# Patient Record
Sex: FEMALE | Race: WHITE | Employment: OTHER | URBAN - METROPOLITAN AREA
[De-identification: names, ages, dates, MRNs, and addresses within clinical notes are randomized per-mention and may not be internally consistent; named-entity substitution may affect disease eponyms.]

---

## 2019-04-26 ENCOUNTER — HOSPITAL ENCOUNTER (OUTPATIENT)
Dept: LAB | Age: 74
Discharge: HOME | End: 2019-04-26

## 2019-04-27 ENCOUNTER — HOSPITAL ENCOUNTER (INPATIENT)
Age: 74
LOS: 3 days | Discharge: HOME | DRG: 809 | End: 2019-04-30
Attending: EMERGENCY MEDICINE | Admitting: FAMILY MEDICINE
Payer: MEDICARE

## 2019-04-27 DIAGNOSIS — D61.818 PANCYTOPENIA (HCC): ICD-10-CM

## 2019-04-27 DIAGNOSIS — D69.6 THROMBOCYTOPENIA (HCC): ICD-10-CM

## 2019-04-27 DIAGNOSIS — D64.9 SYMPTOMATIC ANEMIA: ICD-10-CM

## 2019-04-27 DIAGNOSIS — C92.00 ACUTE MYELOID LEUKEMIA NOT HAVING ACHIEVED REMISSION (HCC): Primary | ICD-10-CM

## 2019-04-27 DIAGNOSIS — D84.9 IMMUNOCOMPROMISED (HCC): ICD-10-CM

## 2019-04-27 LAB
ALBUMIN SERPL-MCNC: 4 G/DL (ref 3.2–4.6)
ALBUMIN/GLOB SERPL: 1 {RATIO} (ref 1.2–3.5)
ALP SERPL-CCNC: 95 U/L (ref 50–136)
ALT SERPL-CCNC: 28 U/L (ref 12–65)
ANION GAP SERPL CALC-SCNC: 8 MMOL/L (ref 7–16)
AST SERPL-CCNC: 38 U/L (ref 15–37)
BACTERIA URNS QL MICRO: 0 /HPF
BASOPHILS # BLD: 0 K/UL (ref 0–0.2)
BASOPHILS NFR BLD: 0 % (ref 0–2)
BILIRUB SERPL-MCNC: 0.6 MG/DL (ref 0.2–1.1)
BUN SERPL-MCNC: 16 MG/DL (ref 8–23)
CALCIUM SERPL-MCNC: 8.6 MG/DL (ref 8.3–10.4)
CASTS URNS QL MICRO: NORMAL /LPF
CHLORIDE SERPL-SCNC: 108 MMOL/L (ref 98–107)
CO2 SERPL-SCNC: 25 MMOL/L (ref 21–32)
CREAT SERPL-MCNC: 0.82 MG/DL (ref 0.6–1)
DIFFERENTIAL METHOD BLD: ABNORMAL
EOSINOPHIL # BLD: 0 K/UL (ref 0–0.8)
EOSINOPHIL NFR BLD: 1 % (ref 0.5–7.8)
EPI CELLS #/AREA URNS HPF: NORMAL /HPF
ERYTHROCYTE [DISTWIDTH] IN BLOOD BY AUTOMATED COUNT: 15.7 % (ref 11.9–14.6)
GLOBULIN SER CALC-MCNC: 4.1 G/DL (ref 2.3–3.5)
GLUCOSE SERPL-MCNC: 105 MG/DL (ref 65–100)
HCT VFR BLD AUTO: 25.5 % (ref 35.8–46.3)
HGB BLD-MCNC: 8.8 G/DL (ref 11.7–15.4)
IMM GRANULOCYTES # BLD AUTO: 0.1 K/UL (ref 0–0.5)
IMM GRANULOCYTES NFR BLD AUTO: 4 % (ref 0–5)
LIPASE SERPL-CCNC: 109 U/L (ref 73–393)
LYMPHOCYTES # BLD: 1.2 K/UL (ref 0.5–4.6)
LYMPHOCYTES NFR BLD: 41 % (ref 13–44)
MCH RBC QN AUTO: 33.6 PG (ref 26.1–32.9)
MCHC RBC AUTO-ENTMCNC: 34.5 G/DL (ref 31.4–35)
MCV RBC AUTO: 97.3 FL (ref 79.6–97.8)
MONOCYTES # BLD: 1 K/UL (ref 0.1–1.3)
MONOCYTES NFR BLD: 37 % (ref 4–12)
NEUTS SEG # BLD: 0.5 K/UL (ref 1.7–8.2)
NEUTS SEG NFR BLD: 17 % (ref 43–78)
NRBC # BLD: 0.03 K/UL (ref 0–0.2)
PLATELET # BLD AUTO: 21 K/UL (ref 150–450)
PLATELET COMMENTS,PCOM: ABNORMAL
PMV BLD AUTO: 11.5 FL (ref 9.4–12.3)
POTASSIUM SERPL-SCNC: 4.3 MMOL/L (ref 3.5–5.1)
PROT SERPL-MCNC: 8.1 G/DL (ref 6.3–8.2)
RBC # BLD AUTO: 2.62 M/UL (ref 4.05–5.2)
RBC #/AREA URNS HPF: NORMAL /HPF
RBC MORPH BLD: ABNORMAL
SODIUM SERPL-SCNC: 141 MMOL/L (ref 136–145)
TROPONIN I SERPL-MCNC: <0.02 NG/ML (ref 0.02–0.05)
WBC # BLD AUTO: 2.8 K/UL (ref 4.3–11.1)
WBC MORPH BLD: ABNORMAL
WBC URNS QL MICRO: NORMAL /HPF

## 2019-04-27 PROCEDURE — 93005 ELECTROCARDIOGRAM TRACING: CPT | Performed by: EMERGENCY MEDICINE

## 2019-04-27 PROCEDURE — 86900 BLOOD TYPING SEROLOGIC ABO: CPT

## 2019-04-27 PROCEDURE — 86923 COMPATIBILITY TEST ELECTRIC: CPT

## 2019-04-27 PROCEDURE — 74011250637 HC RX REV CODE- 250/637: Performed by: FAMILY MEDICINE

## 2019-04-27 PROCEDURE — 81003 URINALYSIS AUTO W/O SCOPE: CPT | Performed by: EMERGENCY MEDICINE

## 2019-04-27 PROCEDURE — 80053 COMPREHEN METABOLIC PANEL: CPT

## 2019-04-27 PROCEDURE — 99284 EMERGENCY DEPT VISIT MOD MDM: CPT | Performed by: EMERGENCY MEDICINE

## 2019-04-27 PROCEDURE — 86920 COMPATIBILITY TEST SPIN: CPT

## 2019-04-27 PROCEDURE — 85025 COMPLETE CBC W/AUTO DIFF WBC: CPT

## 2019-04-27 PROCEDURE — 81015 MICROSCOPIC EXAM OF URINE: CPT

## 2019-04-27 PROCEDURE — 65660000000 HC RM CCU STEPDOWN

## 2019-04-27 PROCEDURE — 74011250636 HC RX REV CODE- 250/636: Performed by: FAMILY MEDICINE

## 2019-04-27 PROCEDURE — 77030039270 HC TU BLD FLTR CARD -A

## 2019-04-27 PROCEDURE — 83690 ASSAY OF LIPASE: CPT

## 2019-04-27 PROCEDURE — 84484 ASSAY OF TROPONIN QUANT: CPT

## 2019-04-27 PROCEDURE — 65270000015 HC RM PRIVATE ONCOLOGY

## 2019-04-27 RX ORDER — SODIUM CHLORIDE 9 MG/ML
250 INJECTION, SOLUTION INTRAVENOUS AS NEEDED
Status: DISCONTINUED | OUTPATIENT
Start: 2019-04-27 | End: 2019-04-30 | Stop reason: HOSPADM

## 2019-04-27 RX ORDER — ONDANSETRON 2 MG/ML
4 INJECTION INTRAMUSCULAR; INTRAVENOUS
Status: DISCONTINUED | OUTPATIENT
Start: 2019-04-27 | End: 2019-04-30 | Stop reason: HOSPADM

## 2019-04-27 RX ORDER — NALOXONE HYDROCHLORIDE 0.4 MG/ML
0.4 INJECTION, SOLUTION INTRAMUSCULAR; INTRAVENOUS; SUBCUTANEOUS AS NEEDED
Status: DISCONTINUED | OUTPATIENT
Start: 2019-04-27 | End: 2019-04-30 | Stop reason: HOSPADM

## 2019-04-27 RX ORDER — ACETAMINOPHEN 325 MG/1
650 TABLET ORAL
Status: DISCONTINUED | OUTPATIENT
Start: 2019-04-27 | End: 2019-04-28

## 2019-04-27 RX ORDER — HYDROCODONE BITARTRATE AND ACETAMINOPHEN 5; 325 MG/1; MG/1
1 TABLET ORAL
Status: DISCONTINUED | OUTPATIENT
Start: 2019-04-27 | End: 2019-04-28

## 2019-04-27 RX ADMIN — ONDANSETRON 4 MG: 2 INJECTION INTRAMUSCULAR; INTRAVENOUS at 22:29

## 2019-04-27 RX ADMIN — HYDROCODONE BITARTRATE AND ACETAMINOPHEN 1 TABLET: 5; 325 TABLET ORAL at 22:29

## 2019-04-28 PROBLEM — C92.00 AML (ACUTE MYELOBLASTIC LEUKEMIA) (HCC): Status: ACTIVE | Noted: 2019-04-28

## 2019-04-28 PROBLEM — R53.1 WEAKNESS GENERALIZED: Status: ACTIVE | Noted: 2019-04-28

## 2019-04-28 PROBLEM — D61.818 PANCYTOPENIA (HCC): Status: ACTIVE | Noted: 2019-04-28

## 2019-04-28 LAB
ATRIAL RATE: 68 BPM
CALCULATED P AXIS, ECG09: 71 DEGREES
CALCULATED R AXIS, ECG10: -26 DEGREES
CALCULATED T AXIS, ECG11: 63 DEGREES
DIAGNOSIS, 93000: NORMAL
DIFFERENTIAL METHOD BLD: ABNORMAL
EOSINOPHIL # BLD: 0.1 K/UL (ref 0–0.8)
EOSINOPHIL NFR BLD MANUAL: 3 % (ref 1–8)
ERYTHROCYTE [DISTWIDTH] IN BLOOD BY AUTOMATED COUNT: 16.2 % (ref 11.9–14.6)
HCT VFR BLD AUTO: 24.8 % (ref 35.8–46.3)
HGB BLD-MCNC: 8.6 G/DL (ref 11.7–15.4)
LDH SERPL L TO P-CCNC: 352 U/L (ref 110–210)
LYMPHOCYTES # BLD: 1.7 K/UL (ref 0.5–4.6)
LYMPHOCYTES NFR BLD MANUAL: 60 % (ref 16–44)
MCH RBC QN AUTO: 33.9 PG (ref 26.1–32.9)
MCHC RBC AUTO-ENTMCNC: 34.7 G/DL (ref 31.4–35)
MCV RBC AUTO: 97.6 FL (ref 79.6–97.8)
MONOCYTES # BLD: 0.6 K/UL (ref 0.1–1.3)
MONOCYTES NFR BLD MANUAL: 22 % (ref 3–9)
NEUTS BAND NFR BLD MANUAL: 2 % (ref 0–10)
NEUTS SEG # BLD: 0.4 K/UL (ref 1.7–8.2)
NEUTS SEG NFR BLD MANUAL: 13 % (ref 47–75)
NRBC # BLD: 0.03 K/UL (ref 0–0.2)
NRBC BLD-RTO: 4 PER 100 WBC
P-R INTERVAL, ECG05: 194 MS
PLATELET # BLD AUTO: 46 K/UL (ref 150–450)
PLATELET COMMENTS,PCOM: ABNORMAL
PMV BLD AUTO: 10.2 FL (ref 9.4–12.3)
Q-T INTERVAL, ECG07: 412 MS
QRS DURATION, ECG06: 108 MS
QTC CALCULATION (BEZET), ECG08: 438 MS
RBC # BLD AUTO: 2.54 M/UL (ref 4.05–5.2)
RBC MORPH BLD: ABNORMAL
URATE SERPL-MCNC: 5.1 MG/DL (ref 2.6–6)
VENTRICULAR RATE, ECG03: 68 BPM
VIT B12 SERPL-MCNC: 998 PG/ML (ref 193–986)
WBC # BLD AUTO: 2.8 K/UL (ref 4.3–11.1)
WBC MORPH BLD: ABNORMAL

## 2019-04-28 PROCEDURE — 76937 US GUIDE VASCULAR ACCESS: CPT

## 2019-04-28 PROCEDURE — 84550 ASSAY OF BLOOD/URIC ACID: CPT

## 2019-04-28 PROCEDURE — P9035 PLATELET PHERES LEUKOREDUCED: HCPCS

## 2019-04-28 PROCEDURE — 82306 VITAMIN D 25 HYDROXY: CPT

## 2019-04-28 PROCEDURE — 99223 1ST HOSP IP/OBS HIGH 75: CPT | Performed by: INTERNAL MEDICINE

## 2019-04-28 PROCEDURE — 74011250637 HC RX REV CODE- 250/637: Performed by: FAMILY MEDICINE

## 2019-04-28 PROCEDURE — 74011250637 HC RX REV CODE- 250/637: Performed by: NURSE PRACTITIONER

## 2019-04-28 PROCEDURE — P9016 RBC LEUKOCYTES REDUCED: HCPCS

## 2019-04-28 PROCEDURE — 65270000015 HC RM PRIVATE ONCOLOGY

## 2019-04-28 PROCEDURE — 65660000000 HC RM CCU STEPDOWN

## 2019-04-28 PROCEDURE — 80074 ACUTE HEPATITIS PANEL: CPT

## 2019-04-28 PROCEDURE — 74011250637 HC RX REV CODE- 250/637: Performed by: INTERNAL MEDICINE

## 2019-04-28 PROCEDURE — 36569 INSJ PICC 5 YR+ W/O IMAGING: CPT | Performed by: NURSE PRACTITIONER

## 2019-04-28 PROCEDURE — 30233N1 TRANSFUSION OF NONAUTOLOGOUS RED BLOOD CELLS INTO PERIPHERAL VEIN, PERCUTANEOUS APPROACH: ICD-10-PCS | Performed by: EMERGENCY MEDICINE

## 2019-04-28 PROCEDURE — 36415 COLL VENOUS BLD VENIPUNCTURE: CPT

## 2019-04-28 PROCEDURE — 74011250636 HC RX REV CODE- 250/636: Performed by: FAMILY MEDICINE

## 2019-04-28 PROCEDURE — 82607 VITAMIN B-12: CPT

## 2019-04-28 PROCEDURE — 87389 HIV-1 AG W/HIV-1&-2 AB AG IA: CPT

## 2019-04-28 PROCEDURE — 36430 TRANSFUSION BLD/BLD COMPNT: CPT

## 2019-04-28 PROCEDURE — 85025 COMPLETE CBC W/AUTO DIFF WBC: CPT

## 2019-04-28 PROCEDURE — 83615 LACTATE (LD) (LDH) ENZYME: CPT

## 2019-04-28 PROCEDURE — C1751 CATH, INF, PER/CENT/MIDLINE: HCPCS

## 2019-04-28 PROCEDURE — 30233R1 TRANSFUSION OF NONAUTOLOGOUS PLATELETS INTO PERIPHERAL VEIN, PERCUTANEOUS APPROACH: ICD-10-PCS | Performed by: EMERGENCY MEDICINE

## 2019-04-28 RX ORDER — FLUCONAZOLE 100 MG/1
200 TABLET ORAL DAILY
Status: DISCONTINUED | OUTPATIENT
Start: 2019-04-28 | End: 2019-04-30 | Stop reason: HOSPADM

## 2019-04-28 RX ORDER — HYDROCODONE BITARTRATE AND ACETAMINOPHEN 5; 325 MG/1; MG/1
1 TABLET ORAL
Status: DISCONTINUED | OUTPATIENT
Start: 2019-04-28 | End: 2019-04-30 | Stop reason: HOSPADM

## 2019-04-28 RX ORDER — DIPHENHYDRAMINE HCL 25 MG
CAPSULE ORAL
Status: ACTIVE
Start: 2019-04-28 | End: 2019-04-28

## 2019-04-28 RX ORDER — ACYCLOVIR 800 MG/1
400 TABLET ORAL 2 TIMES DAILY
Status: DISCONTINUED | OUTPATIENT
Start: 2019-04-28 | End: 2019-04-30 | Stop reason: HOSPADM

## 2019-04-28 RX ORDER — LORAZEPAM 1 MG/1
1 TABLET ORAL
Status: DISCONTINUED | OUTPATIENT
Start: 2019-04-28 | End: 2019-04-30 | Stop reason: HOSPADM

## 2019-04-28 RX ORDER — ALLOPURINOL 300 MG/1
300 TABLET ORAL DAILY
Status: DISCONTINUED | OUTPATIENT
Start: 2019-04-28 | End: 2019-04-30 | Stop reason: HOSPADM

## 2019-04-28 RX ORDER — MORPHINE SULFATE 2 MG/ML
2 INJECTION, SOLUTION INTRAMUSCULAR; INTRAVENOUS
Status: DISCONTINUED | OUTPATIENT
Start: 2019-04-28 | End: 2019-04-30 | Stop reason: HOSPADM

## 2019-04-28 RX ORDER — ESCITALOPRAM OXALATE 10 MG/1
10 TABLET ORAL DAILY
COMMUNITY
End: 2019-07-10

## 2019-04-28 RX ORDER — PRAVASTATIN SODIUM 10 MG/1
20 TABLET ORAL
COMMUNITY

## 2019-04-28 RX ORDER — CELECOXIB 200 MG/1
200 CAPSULE ORAL DAILY
COMMUNITY
End: 2019-04-30

## 2019-04-28 RX ORDER — PRAVASTATIN SODIUM 20 MG/1
10 TABLET ORAL
Status: DISCONTINUED | OUTPATIENT
Start: 2019-04-28 | End: 2019-04-30 | Stop reason: HOSPADM

## 2019-04-28 RX ORDER — ESCITALOPRAM OXALATE 10 MG/1
10 TABLET ORAL DAILY
Status: DISCONTINUED | OUTPATIENT
Start: 2019-04-28 | End: 2019-04-30 | Stop reason: HOSPADM

## 2019-04-28 RX ORDER — DIPHENHYDRAMINE HCL 25 MG
25 CAPSULE ORAL
Status: DISCONTINUED | OUTPATIENT
Start: 2019-04-28 | End: 2019-04-30 | Stop reason: HOSPADM

## 2019-04-28 RX ORDER — LORAZEPAM 1 MG/1
TABLET ORAL
COMMUNITY
End: 2020-01-01

## 2019-04-28 RX ORDER — PROCHLORPERAZINE EDISYLATE 5 MG/ML
5-10 INJECTION INTRAMUSCULAR; INTRAVENOUS
Status: DISCONTINUED | OUTPATIENT
Start: 2019-04-28 | End: 2019-04-30 | Stop reason: HOSPADM

## 2019-04-28 RX ORDER — DIPHENHYDRAMINE HCL 25 MG
50 CAPSULE ORAL ONCE
Status: COMPLETED | OUTPATIENT
Start: 2019-04-28 | End: 2019-04-28

## 2019-04-28 RX ORDER — LORAZEPAM 1 MG/1
TABLET ORAL
Status: ACTIVE
Start: 2019-04-28 | End: 2019-04-28

## 2019-04-28 RX ORDER — POLYETHYLENE GLYCOL 3350 17 G/17G
17 POWDER, FOR SOLUTION ORAL
Status: DISCONTINUED | OUTPATIENT
Start: 2019-04-28 | End: 2019-04-30 | Stop reason: HOSPADM

## 2019-04-28 RX ADMIN — LORAZEPAM 1 MG: 1 TABLET ORAL at 01:31

## 2019-04-28 RX ADMIN — FLUCONAZOLE 200 MG: 100 TABLET ORAL at 09:09

## 2019-04-28 RX ADMIN — ACYCLOVIR 400 MG: 800 TABLET ORAL at 17:26

## 2019-04-28 RX ADMIN — ONDANSETRON 4 MG: 2 INJECTION INTRAMUSCULAR; INTRAVENOUS at 09:09

## 2019-04-28 RX ADMIN — ESCITALOPRAM OXALATE 10 MG: 10 TABLET ORAL at 09:09

## 2019-04-28 RX ADMIN — PRAVASTATIN SODIUM 10 MG: 20 TABLET ORAL at 22:37

## 2019-04-28 RX ADMIN — LORAZEPAM 1 MG: 1 TABLET ORAL at 22:36

## 2019-04-28 RX ADMIN — DIPHENHYDRAMINE HYDROCHLORIDE 25 MG: 25 CAPSULE ORAL at 01:31

## 2019-04-28 RX ADMIN — DIPHENHYDRAMINE HYDROCHLORIDE 50 MG: 25 CAPSULE ORAL at 22:38

## 2019-04-28 RX ADMIN — ACYCLOVIR 400 MG: 800 TABLET ORAL at 09:09

## 2019-04-28 RX ADMIN — ONDANSETRON 4 MG: 2 INJECTION INTRAMUSCULAR; INTRAVENOUS at 17:30

## 2019-04-28 RX ADMIN — ALLOPURINOL 300 MG: 300 TABLET ORAL at 09:09

## 2019-04-28 NOTE — PROGRESS NOTES
Problem: Falls - Risk of 
Goal: *Absence of Falls Description Document Etienne Dasilva Fall Risk and appropriate interventions in the flowsheet.  
Outcome: Progressing Towards Goal

## 2019-04-28 NOTE — PROGRESS NOTES
EOS: VSS.  to bring CPAP from home. Tolerated blood transfusions without reaction. Discussed diagnosis at length with pt and . Pt had appointment scheduled with Dr. Marianela Rodriguez on Monday. Oncology will see her today and discuss prognosis and treatment options. Continue with POC. Report to oncoming RN.

## 2019-04-28 NOTE — H&P
Hospitalist Admission History and Physical  
 
NAME:  Mendez Bautista Age:  68 y.o. 
:   1945 MRN:   [id number] PCP: None Consulting MD: Treatment Team: Attending Provider: Cassandra Giang DO Chief Complaint Patient presents with  Fatigue  Cancer HPI:  
Patient is a 68 y.o. female who presented to the ED for cc nausea, poor PO intake, ecchymosis, and bleeding. Patient recently had a bone marrow biopsy on  that revealed AML. Last CBC showed Hg 9.2 and platelet level of 24 on 19. Since today, patient noted bleeding from her left buttock cheek and had increased shortness of breathe. Vitals stable Labs - WBC 2.8, Hg 8.8, Platelets 21, absolute neutrophil count 0.5, Past Medical History:  
Diagnosis Date  Cancer ([facility] [address] AML No past surgical history on file. No family history on file. Social History Social History Narrative  Not on file Social History Tobacco Use  Smoking status: Never Smoker  Smokeless tobacco: Never Used Substance Use Topics  Alcohol use: Never Frequency: Never Social History Substance and Sexual Activity Drug Use Never No Known Allergies Prior to Admission medications Medication Sig Start Date End Date Taking? Authorizing Provider  
celecoxib (CELEBREX) 200 mg capsule Take 200 mg by mouth daily. Yes Provider, Historical  
vit C/E/zinc/lutein/zeaxanthin ([address] PO) Take 1 Tab by mouth daily. Yes Provider, Historical  
escitalopram oxalate (LEXAPRO) 10 mg tablet Take 10 mg by mouth daily. Yes Provider, Historical  
LORazepam (ATIVAN) 1 mg tablet Take  by mouth nightly. Yes Provider, Historical  
acetaminophen 500 mg tab 500 mg, diphenhydrAMINE 25 mg cap 25 mg Take  by mouth nightly. Yes Provider, Historical  
pravastatin (PRAVACHOL) 10 mg tablet Take  by mouth nightly. Yes Provider, Historical  
 
 
 
 
Review of Systems Constitutional:NAD 
 Eyes:  no change in visual acuity, no photophobia Ears, nose, mouth, throat, and face: no  Odynphagia, dysphagia, no thrush or exudate, negative for chronic sinus congestion, recurrent headaches Respiratory: negative for SOB, hemoptysis or cough Cardiovascular: negative for CP, palpitations, or PND Gastrointestinal: negative for abdominal pain, no hematemesis, hematochezia or BRBPR Genitourinary: no urgency, frequency, or dysuria, no nocturia Integument/breast: negative for skin rash or skin lesions Hematologic/lymphatic: bleeding from left buttock that has stopped Musculoskeletal:generalized weakness Neurological: negative for lightheadedness, syncope or presyncopal events, no seizure or CVA history Behavioral/Psych: negative for depression or chronic anxiety, Endocrine: negative for polydyspia, polyuria or intolerance to heat or cold Allergic/Immunologic: negative for chronic allergic rhinitis, or known connective tissue disorder Objective:  
 
Visit Vitals /66 (BP 1 Location: Right arm, BP Patient Position: At rest) Pulse 74 Temp 98.3 °F (36.8 °C) Resp 18 Ht 5' 6\" (1.676 m) Wt 86.3 kg (190 lb 4.8 oz) SpO2 93% Breastfeeding? No  
BMI 30.72 kg/m² No intake/output data recorded. No intake/output data recorded. Data Review:  
Recent Results (from the past 24 hour(s)) CBC WITH AUTOMATED DIFF Collection Time: 04/27/19  8:10 PM  
Result Value Ref Range WBC 2.8 (L) 4.3 - 11.1 K/uL  
 RBC 2.62 (L) 4.05 - 5.2 M/uL HGB 8.8 (L) 11.7 - 15.4 g/dL HCT 25.5 (L) 35.8 - 46.3 % MCV 97.3 79.6 - 97.8 FL  
 MCH 33.6 (H) 26.1 - 32.9 PG  
 MCHC 34.5 31.4 - 35.0 g/dL  
 RDW 15.7 (H) 11.9 - 14.6 % PLATELET 21 (LL) 994 - 450 K/uL MPV 11.5 9.4 - 12.3 FL ABSOLUTE NRBC 0.03 0.0 - 0.2 K/uL NEUTROPHILS 17 (L) 43 - 78 % LYMPHOCYTES 41 13 - 44 % MONOCYTES 37 (H) 4.0 - 12.0 % EOSINOPHILS 1 0.5 - 7.8 %  BASOPHILS 0 0.0 - 2.0 %  
 IMMATURE GRANULOCYTES 4 0.0 - 5.0 %  
 ABS. NEUTROPHILS 0.5 (L) 1.7 - 8.2 K/UL  
 ABS. LYMPHOCYTES 1.2 0.5 - 4.6 K/UL  
 ABS. MONOCYTES 1.0 0.1 - 1.3 K/UL  
 ABS. EOSINOPHILS 0.0 0.0 - 0.8 K/UL  
 ABS. BASOPHILS 0.0 0.0 - 0.2 K/UL  
 ABS. IMM. GRANS. 0.1 0.0 - 0.5 K/UL  
 RBC COMMENTS NORMOCYTIC/NORMOCHROMIC    
 WBC COMMENTS Result Confirmed By Smear PLATELET COMMENTS MARKED    
 DF AUTOMATED METABOLIC PANEL, COMPREHENSIVE Collection Time: 04/27/19  8:10 PM  
Result Value Ref Range Sodium 141 136 - 145 mmol/L Potassium 4.3 3.5 - 5.1 mmol/L Chloride 108 (H) 98 - 107 mmol/L  
 CO2 25 21 - 32 mmol/L Anion gap 8 7 - 16 mmol/L Glucose 105 (H) 65 - 100 mg/dL BUN 16 8 - 23 MG/DL Creatinine 0.82 0.6 - 1.0 MG/DL  
 GFR est AA >60 >60 ml/min/1.73m2 GFR est non-AA >60 >60 ml/min/1.73m2 Calcium 8.6 8.3 - 10.4 MG/DL Bilirubin, total 0.6 0.2 - 1.1 MG/DL  
 ALT (SGPT) 28 12 - 65 U/L  
 AST (SGOT) 38 (H) 15 - 37 U/L Alk. phosphatase 95 50 - 136 U/L Protein, total 8.1 6.3 - 8.2 g/dL Albumin 4.0 3.2 - 4.6 g/dL Globulin 4.1 (H) 2.3 - 3.5 g/dL A-G Ratio 1.0 (L) 1.2 - 3.5 LIPASE Collection Time: 04/27/19  8:10 PM  
Result Value Ref Range Lipase 109 73 - 393 U/L  
TROPONIN I Collection Time: 04/27/19  8:10 PM  
Result Value Ref Range Troponin-I, Qt. <0.02 (L) 0.02 - 0.05 NG/ML  
EKG, 12 LEAD, INITIAL Collection Time: 04/27/19  8:13 PM  
Result Value Ref Range Ventricular Rate 68 BPM  
 Atrial Rate 68 BPM  
 P-R Interval 194 ms QRS Duration 108 ms Q-T Interval 412 ms QTC Calculation (Bezet) 438 ms Calculated P Axis 71 degrees Calculated R Axis -26 degrees Calculated T Axis 63 degrees Diagnosis Normal sinus rhythm Minimal voltage criteria for LVH, may be normal variant Borderline ECG No previous ECGs available PLATELETS, ALLOCATE Collection Time: 04/27/19  8:45 PM  
Result Value Ref Range Unit number [id number] Blood component type PLTPH LR 3 Unit division 00 Status of unit ISSUED   
TYPE + CROSSMATCH Collection Time: 04/27/19  8:47 PM  
Result Value Ref Range Crossmatch Expiration 04/30/2019 ABO/Rh(D) AB POSITIVE Antibody screen NEG Unit number [id number] Blood component type RC LR Unit division 00 Status of unit ALLOCATED Crossmatch result Compatible URINE MICROSCOPIC Collection Time: 04/27/19  9:24 PM  
Result Value Ref Range WBC 0-3 0 /hpf  
 RBC  0 /hpf Epithelial cells 0-3 0 /hpf Bacteria 0 0 /hpf Casts 0-3 0 /lpf Physical Exam:  
 
General:  Alert, cooperative, no distress, appears stated age. Pale. Eyes:  Conjunctivae/corneas clear. Ears:  Normal TMs and external ear canals both ears. Nose: Nares normal. Septum midline. Mouth/Throat: Lips, mucosa, and tongue normal.  
Neck:  no JVD. Back:   Symmetric, no curvature. ROM normal. No CVA tenderness. Lungs:   Clear to auscultation bilaterally. Heart:  Regular rate and rhythm, S1, S2 normal, no murmur, click, rub or gallop. Abdomen:   Soft, non-tender. Bowel sounds normal. No masses,  No organomegaly. Extremities: Extremities normal, atraumatic, no cyanosis or edema. Pulses: 2+ and symmetric all extremities. Skin: Ecchymosis from recent bone marrow biopsy. No active bleeding noted. Lymph nodes: Cervical, supraclavicular, and axillary nodes normal.  
Neurologic: CNII-XII intact. Assessment and Plan Active Problems: Thrombocytopenia ([address] (4/27/2019) AML (acute myeloblastic leukemia) ([address] (4/28/2019) Weakness generalized (4/28/2019) Pancytopenia ([address] (4/28/2019) Pancytopenia - To get 1 unit of platelets and 1 unit PRBC. Check later this AM. No obvious signs of bleeding now. Trend H&H. AML - Consult oncology to see later this AM. Assume care. Poor PO intake - PRN Zofran. Encourage good eating and drinking. Patient has noted neutropenia. No fever. Possible candidate for Neupogen if heme/onc agrees. DVT prophylaxis - SCDs Signed By: Arely Sotomayor DO April 28, 2019

## 2019-04-28 NOTE — ED NOTES
Patient to ED via POV. Patient with recent CA (AML) diagnosis, scheduled to follow with Eri Howe on Monday. Patient reports increasing lethargy with nausea w/o vomiting. Patient reports poor intake. Patient reports significant bruising to groin/genitals without trauma. Patient reports an area of irritate to the buttock that was bleeding heavily, unknown source. Patient denies the blood as from the rectum. Patient had a transfusion on Tuesday of prbc and platelets. Patient with stable VS at time of triage.

## 2019-04-28 NOTE — ROUTINE PROCESS
TRANSFER - OUT REPORT: 
 
Verbal report given to Wendie(name) on Mila Speed  being transferred to [facility](unit) for routine progression of care Report consisted of patients Situation, Background, Assessment and  
Recommendations(SBAR). Information from the following report(s) SBAR was reviewed with the receiving nurse. Lines:  
Peripheral IV 04/27/19 Left Antecubital (Active) Site Assessment Clean, dry, & intact 4/27/2019 10:12 PM  
Phlebitis Assessment 0 4/27/2019 10:12 PM  
Infiltration Assessment 0 4/27/2019 10:12 PM  
Dressing Status Clean, dry, & intact 4/27/2019 10:12 PM  
Dressing Type Transparent 4/27/2019 10:12 PM  
  
 
Opportunity for questions and clarification was provided. Patient transported with: 
 [employer]

## 2019-04-28 NOTE — CONSULTS
[employer] [facility] Inpatient Hematology / Oncology Consult NoteReason for Consult: Thrombocytopenia ([facility] [D69.6] Referring Physician:  Jo-Ann Silva DO History of Present Illness: Ms. Allyssa Howe is a 68 y.o. female admitted on 4/27/2019. She presented to ED with c/o nausea, poor oral intake, ecchymosis, and bleedinf from her L buttock cheek. She also c/o malaise and nausea. She had a recent BMbx at [facility] on 4/24 which reveal AML. She was scheduled to f/u with Dr. Mariann Tierney tomorrow as a new patient. On admission, WBC 2.8/ANC 0.5, Hgb 8.8, Plt 21k. She was given 1 unit PRBCs and 1 unit plt. Hgb 8.6 and Plt 46k today. No further bleeding noted. We were consulted to assume care. Review of Systems: 
Constitutional +fatigue +appetite changes. Denies fever, chills, weight loss, night sweats. HEENT Denies trauma, blurry vision, hearing loss, ear pain, nosebleeds, sore throat, neck pain and ear discharge. Skin Denies lesions or rashes. Lungs Denies dyspnea, cough, sputum production or hemoptysis. Cardiovascular Denies chest pain, palpitations, or lower extremity edema. Gastrointestinal +nausea. Denies vomiting, changes in bowel habits, bloody or black stools, abdominal pain.  Denies dysuria, frequency or hesitancy of urination. Neuro Denies headaches, visual changes or ataxia. Denies dizziness, tingling, tremors, sensory change, speech change, focal weakness or headaches. Hematology +easy bruising. Denies gingival bleeding or epistaxis. Endo Denies heat/cold intolerance, denies diabetes or thyroid abnormalities. MSK Denies back pain, arthralgias, myalgias or frequent falls. Psychiatric/Behavioral +anxious. Denies depression and substance abuse. No Known Allergies Past Medical History:  
Diagnosis Date  Cancer ([facility] [address] AML No past surgical history on file. No family history on file. Social History Socioeconomic History  Marital status:  Spouse name: Not on file  Number of children: Not on file  Years of education: Not on file  Highest education level: Not on file Occupational History  Not on file Social Needs  Financial resource strain: Not on file  Food insecurity:  
  Worry: Not on file Inability: Not on file  Transportation needs:  
  Medical: Not on file Non-medical: Not on file Tobacco Use  Smoking status: Never Smoker  Smokeless tobacco: Never Used Substance and Sexual Activity  Alcohol use: Never Frequency: Never  Drug use: Never  Sexual activity: Not on file Lifestyle  Physical activity:  
  Days per week: Not on file Minutes per session: Not on file  Stress: Not on file Relationships  Social connections:  
  Talks on phone: Not on file Gets together: Not on file Attends [employer] witness service: Not on file Active member of club or organization: Not on file Attends meetings of clubs or organizations: Not on file Relationship status: Not on file  Intimate partner violence:  
  Fear of current or ex partner: Not on file Emotionally abused: Not on file Physically abused: Not on file Forced sexual activity: Not on file Other Topics Concern  Not on file Social History Narrative  Not on file Current Facility-Administered Medications Medication Dose Route Frequency Provider Last Rate Last Dose  escitalopram oxalate (LEXAPRO) tablet 10 mg  10 mg Oral DAILY Dylan Sosa, DO   10 mg at 04/28/19 5114  LORazepam (ATIVAN) tablet 1 mg  1 mg Oral QHS Dylan Sosa, DO   1 mg at 04/28/19 0131  
 pravastatin (PRAVACHOL) tablet 10 mg  10 mg Oral QHS Dylan Sosa, DO      
 diphenhydrAMINE (BENADRYL) capsule 25 mg  25 mg Oral Q4H PRN Celena Melgar MD   25 mg at 04/28/19 0131  LORazepam (ATIVAN) 1 mg tablet  diphenhydrAMINE (BENADRYL) 25 mg capsule  acyclovir (ZOVIRAX) tablet 400 mg  400 mg Oral BID Roman Leon NP   400 mg at 19 0279  fluconazole (DIFLUCAN) tablet 200 mg  200 mg Oral DAILY Roman Leon NP   200 mg at 19 3802  allopurinol (ZYLOPRIM) tablet 300 mg  300 mg Oral DAILY Roman Leon NP   300 mg at 19 2527  LORazepam (ATIVAN) tablet 1 mg  1 mg Oral Q6H PRN Roman Leon NP      
 prochlorperazine (COMPAZINE) injection 5-10 mg  5-10 mg IntraVENous Q6H PRN Roman Leon NP      
 HYDROcodone-acetaminophen (NORCO) 5-325 mg per tablet 1 Tab  1 Tab Oral Q6H PRN Roman Leon NP      
 morphine injection 2 mg  2 mg IntraVENous Q4H PRN Roman Leon NP      
 0.9% sodium chloride infusion 250 mL  250 mL IntraVENous PRN Robel Bergman MD      
 [facility]) injection 0.4 mg  0.4 mg IntraVENous PRN Cleola Casino, DO      
 ondansetron [facility]) injection 4 mg  4 mg IntraVENous Q4H PRN Cleola Casino, DO   4 mg at 19 9744 OBJECTIVE: 
Patient Vitals for the past 8 hrs: 
 BP Temp Pulse Resp SpO2  
19 1035 146/62 97.5 °F (36.4 °C) 67 18 95 % 19 0740 130/61 98.5 °F (36.9 °C) 73 18 95 % Temp (24hrs), Av.3 °F (36.8 °C), Min:97.5 °F (36.4 °C), Max:98.5 °F (36.9 °C) 
 
 07 -  1900 In: -  
Out: 850 [Urine:850] Physical Exam: 
Constitutional: Well developed, well nourished female in no acute distress, sitting comfortably on the bedside couch.  at bedside. HEENT: Normocephalic and atraumatic. Oropharynx is clear, mucous membranes are moist. Extraocular muscles are intact. Sclerae anicteric. Neck supple without JVD. No thyromegaly present. Skin Warm and dry. Bruising noted. No erythema. No pallor. Respiratory Lungs are clear to auscultation bilaterally without wheezes, rales or rhonchi, normal air exchange without accessory muscle use. CVS Normal rate, regular rhythm and normal S1 and S2. No murmurs, gallops, or rubs. Abdomen Soft, nontender and nondistended, normoactive bowel sounds. No palpable mass. No hepatosplenomegaly. Neuro Grossly nonfocal with no obvious sensory or motor deficits. MSK Normal range of motion in general.  No edema and no tenderness. Psych Anxious. Labs:   
Recent Results (from the past 24 hour(s)) CBC WITH AUTOMATED DIFF Collection Time: 04/27/19  8:10 PM  
Result Value Ref Range WBC 2.8 (L) 4.3 - 11.1 K/uL  
 RBC 2.62 (L) 4.05 - 5.2 M/uL HGB 8.8 (L) 11.7 - 15.4 g/dL HCT 25.5 (L) 35.8 - 46.3 % MCV 97.3 79.6 - 97.8 FL  
 MCH 33.6 (H) 26.1 - 32.9 PG  
 MCHC 34.5 31.4 - 35.0 g/dL  
 RDW 15.7 (H) 11.9 - 14.6 % PLATELET 21 (LL) 567 - 450 K/uL MPV 11.5 9.4 - 12.3 FL ABSOLUTE NRBC 0.03 0.0 - 0.2 K/uL NEUTROPHILS 17 (L) 43 - 78 % LYMPHOCYTES 41 13 - 44 % MONOCYTES 37 (H) 4.0 - 12.0 % EOSINOPHILS 1 0.5 - 7.8 % BASOPHILS 0 0.0 - 2.0 % IMMATURE GRANULOCYTES 4 0.0 - 5.0 %  
 ABS. NEUTROPHILS 0.5 (L) 1.7 - 8.2 K/UL  
 ABS. LYMPHOCYTES 1.2 0.5 - 4.6 K/UL  
 ABS. MONOCYTES 1.0 0.1 - 1.3 K/UL  
 ABS. EOSINOPHILS 0.0 0.0 - 0.8 K/UL  
 ABS. BASOPHILS 0.0 0.0 - 0.2 K/UL  
 ABS. IMM. GRANS. 0.1 0.0 - 0.5 K/UL  
 RBC COMMENTS NORMOCYTIC/NORMOCHROMIC    
 WBC COMMENTS Result Confirmed By Smear PLATELET COMMENTS MARKED    
 DF AUTOMATED METABOLIC PANEL, COMPREHENSIVE Collection Time: 04/27/19  8:10 PM  
Result Value Ref Range Sodium 141 136 - 145 mmol/L Potassium 4.3 3.5 - 5.1 mmol/L Chloride 108 (H) 98 - 107 mmol/L  
 CO2 25 21 - 32 mmol/L Anion gap 8 7 - 16 mmol/L Glucose 105 (H) 65 - 100 mg/dL BUN 16 8 - 23 MG/DL Creatinine 0.82 0.6 - 1.0 MG/DL  
 GFR est AA >60 >60 ml/min/1.73m2 GFR est non-AA >60 >60 ml/min/1.73m2 Calcium 8.6 8.3 - 10.4 MG/DL Bilirubin, total 0.6 0.2 - 1.1 MG/DL  
 ALT (SGPT) 28 12 - 65 U/L  
 AST (SGOT) 38 (H) 15 - 37 U/L Alk. phosphatase 95 50 - 136 U/L Protein, total 8.1 6.3 - 8.2 g/dL Albumin 4.0 3.2 - 4.6 g/dL Globulin 4.1 (H) 2.3 - 3.5 g/dL A-G Ratio 1.0 (L) 1.2 - 3.5 LIPASE Collection Time: 04/27/19  8:10 PM  
Result Value Ref Range Lipase 109 73 - 393 U/L  
TROPONIN I Collection Time: 04/27/19  8:10 PM  
Result Value Ref Range Troponin-I, Qt. <0.02 (L) 0.02 - 0.05 NG/ML  
EKG, 12 LEAD, INITIAL Collection Time: 04/27/19  8:13 PM  
Result Value Ref Range Ventricular Rate 68 BPM  
 Atrial Rate 68 BPM  
 P-R Interval 194 ms QRS Duration 108 ms Q-T Interval 412 ms QTC Calculation (Bezet) 438 ms Calculated P Axis 71 degrees Calculated R Axis -26 degrees Calculated T Axis 63 degrees Diagnosis Normal sinus rhythm Minimal voltage criteria for LVH, may be normal variant Borderline ECG No previous ECGs available PLATELETS, ALLOCATE Collection Time: 04/27/19  8:45 PM  
Result Value Ref Range Unit number [id number] Blood component type PLTPH LR 3 Unit division 00 Status of unit ISSUED   
TYPE + CROSSMATCH Collection Time: 04/27/19  8:47 PM  
Result Value Ref Range Crossmatch Expiration 04/30/2019 ABO/Rh(D) AB POSITIVE Antibody screen NEG Unit number [id number] Blood component type RC LR Unit division 00 Status of unit ISSUED Crossmatch result Compatible URINE MICROSCOPIC Collection Time: 04/27/19  9:24 PM  
Result Value Ref Range WBC 0-3 0 /hpf  
 RBC  0 /hpf Epithelial cells 0-3 0 /hpf Bacteria 0 0 /hpf Casts 0-3 0 /lpf  
CBC WITH AUTOMATED DIFF Collection Time: 04/28/19  6:05 AM  
Result Value Ref Range WBC 2.8 (L) 4.3 - 11.1 K/uL  
 RBC 2.54 (L) 4.05 - 5.2 M/uL HGB 8.6 (L) 11.7 - 15.4 g/dL HCT 24.8 (L) 35.8 - 46.3 % MCV 97.6 79.6 - 97.8 FL  
 MCH 33.9 (H) 26.1 - 32.9 PG  
 MCHC 34.7 31.4 - 35.0 g/dL  
 RDW 16.2 (H) 11.9 - 14.6 % PLATELET 46 (L) 618 - 450 K/uL MPV 10.2 9.4 - 12.3 FL ABSOLUTE NRBC 0.03 0.0 - 0.2 K/uL  
 DF MANUAL ABS. NEUTROPHILS 0.4 (L) 1.7 - 8.2 K/UL  
 ABS. LYMPHOCYTES 1.7 0.5 - 4.6 K/UL  
 ABS. MONOCYTES 0.6 0.1 - 1.3 K/UL  
 ABS. EOSINOPHILS 0.1 0.0 - 0.8 K/UL  
 NEUTROPHILS 13 (L) 47 - 75 % BAND NEUTROPHILS 2 0 - 10 % LYMPHOCYTES 60 (H) 16 - 44 % MONOCYTES 22 (H) 3 - 9 % EOSINOPHILS 3 1 - 8 % NRBC 4.0  WBC  
 RBC COMMENTS NORMOCYTIC/NORMOCHROMIC    
 WBC COMMENTS Result Confirmed By Smear PLATELET COMMENTS MARKED    
LD Collection Time: 04/28/19  6:05 AM  
Result Value Ref Range  (H) 110 - 210 U/L  
URIC ACID Collection Time: 04/28/19  6:05 AM  
Result Value Ref Range Uric acid 5.1 2.6 - 6.0 MG/DL Imaging: 
n/a ASSESSMENT: 
Problem List  Never Reviewed Codes Class Noted AML (acute myeloblastic leukemia) ([facility] ICD-10-CM: C92.00 ICD-9-CM: 205.00  4/28/2019 Weakness generalized ICD-10-CM: R53.1 ICD-9-CM: 780.79  4/28/2019 Pancytopenia ([facility] ICD-10-CM: G68.188 ICD-9-CM: 284.19  4/28/2019 Thrombocytopenia ([facility] ICD-10-CM: D69.6 ICD-9-CM: 287.5  4/27/2019 RECOMMENDATIONS: 
AML  
- dx via BMbx at [facility] on 4/24. Will need to request BMbx records tomorrow - [facility] 
- Echo ordered - Check LDH, uric acid, Hep panel, HIV, Vit B12, Vit D Pancytopenia secondary to disease - Transfuse prn per Alexander SOPs Anxiety 
- Con't Lexapro - Ativan ordered prn 
 
Continue home meds Alexander SOPs SCDs for DVT prophylaxis (AC contraindicated d/t thrombocytopenia) Lab studies and imaging studies were personally reviewed. Thank you for allowing us to participate in the care of Ms. Sotero Ace. We will assume care of this patient as requested. Dillan Katz, ARMAND[facility]ology [facility] On[facility] [address] [place] [address] Avenue Office : [phone] Fax : [phone]

## 2019-04-28 NOTE — PROGRESS NOTES
TRANSFER - IN REPORT: 
 
Verbal report received from Gerardo Sy RN on Devendra Hanks  being received from ED for routine progression of care Report consisted of patients Situation, Background, Assessment and  
Recommendations(SBAR). Information from the following report(s) SBAR, ED Summary, Intake/Output, MAR and Recent Results was reviewed with the receiving nurse. Opportunity for questions and clarification was provided. Assessment completed upon patients arrival to unit and care assumed.

## 2019-04-28 NOTE — PROGRESS NOTES
Unsuccessful PICC Placement Note Correct Procedure: yes time out completed assistant annamarie miguel rn all persons present in agreement with time out. PRE-PROCEDURE VERIFICATION Correct Site:  Yes Allergies verfied:  Yes Temperature: Temp: 97.5 °F (36.4 °C), Temperature Source: Temp Source: Oral 
Recent Labs  
  04/28/19 0605 04/27/19 2010 BUN  --  16  
CREA  --  0.82 PLT 46* 21* WBC 2.8* 2.8* Allergies: @AL PROCEDURE DETAIL A double lumen PICC line was attempted for chemo therapy. Complication related to insertion: right cephalic vein was accessed but catheter would not thread no other veins in right arm large enough to accommodate line so move to left arm and accessed the basilic vein but guide wire would not thread x 2 so accessed the brachial vein but pt stated that it felt like she was being stabbed in arm pit when guide wire was inserted so guide wire and needle both removed and primary rn informed that picc placement was unsuccessful. Lot #: [id number] Xylocaine used: yes Mid-Arm Circumference: 35 (cm) Right cephalic Left basilic

## 2019-04-28 NOTE — PROGRESS NOTES
Referral from staff Patient was alert and sitting in chair No family was present Used visit as a opportunity to build rapport with patient She was very pleasant and open in her feelings and conversation Patient said she has always had a strong anil She is \"frustrated\" somewhat that this illness has challenged her stability to trust in the [place] We spent time exploring that feeling -  Guided patient in seeing the possibility that this illness is a chance \"to grow\" in her anil Shared with her a story that illustrates that point and how this can lead to a more fulfilling future in her walk of anil Patient demonstrated times of peace and also times of sadness as we spoke Challenged patient to ponder our conversation and to be sensitive to His leading and plan as the journey unfolds Prayer offered Will follow as her time with us continues Sheridan Tyler, staff [address], [address]  /   [email or web address]

## 2019-04-28 NOTE — PROGRESS NOTES
EOS: Pt tearful this morning, stating \"I feel overwhelmed. \" Sat with patient, listened to concerns, and asked pt if she would be interested in visiting with one of our chaplains. Pt said she would like that very much. Bill Fairchild, staff  visited with pt. PICC line was unable to be placed (see vascular access note). C/o intermittent nausea. Zofran IV administered before meals. Consult to IR for [facility] tomorrow. NPO at MD. Remote tele dc'd. 2D echo pending. Multiple labs to be drawn. Home CPAP at bedside. No concerns voiced at this time. Pt resting quietly. Continue current POC.

## 2019-04-28 NOTE — ED PROVIDER NOTES
Presents with complaints of fatigue nausea and anorexia and spontaneous bruising and bleeding. 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 She had a blood transfusion and platelet on Monday and bone marrow biopsy on the 24th. She is supposed to have an appointment with oncology Dr. Saurav Ackerman this on Monday for her first visit. The history is provided by the patient. Fatigue This is a new problem. The current episode started more than 2 days ago. The problem has been gradually worsening. There was no focality noted. Cancer Past Medical History:  
Diagnosis Date  Cancer ([facility] [address] AML No past surgical history on file. No family history on file. Social History Socioeconomic History  Marital status:  Spouse name: Not on file  Number of children: Not on file  Years of education: Not on file  Highest education level: Not on file Occupational History  Not on file Social Needs  Financial resource strain: Not on file  Food insecurity:  
  Worry: Not on file Inability: Not on file  Transportation needs:  
  Medical: Not on file Non-medical: Not on file Tobacco Use  Smoking status: Never Smoker  Smokeless tobacco: Never Used Substance and Sexual Activity  Alcohol use: Never Frequency: Never  Drug use: Never  Sexual activity: Not on file Lifestyle  Physical activity:  
  Days per week: Not on file Minutes per session: Not on file  Stress: Not on file Relationships  Social connections:  
  Talks on phone: Not on file Gets together: Not on file Attends Sabianist service: Not on file Active member of club or organization: Not on file Attends meetings of clubs or organizations: Not on file Relationship status: Not on file  Intimate partner violence:  
  Fear of current or ex partner: Not on file Emotionally abused: Not on file Physically abused: Not on file Forced sexual activity: Not on file Other Topics Concern  Not on file Social History Narrative  Not on file ALLERGIES: Patient has no known allergies. Review of Systems Constitutional: Positive for fatigue. Negative for chills and fever. Skin: Positive for color change. Neurological: Positive for light-headedness. Hematological: Bruises/bleeds easily. All other systems reviewed and are negative. Vitals:  
 04/27/19 2006 BP: 152/71 Pulse: 83 Resp: 16 Temp: 98.2 °F (36.8 °C) SpO2: 98% Weight: 88.9 kg (196 lb) Height: 5' 6\" (1.676 m) Physical Exam  
Constitutional: She is oriented to person, place, and time. She appears well-developed and well-nourished. No distress. HENT:  
Head: Normocephalic and atraumatic. Eyes: Pupils are equal, round, and reactive to light. EOM are normal.  
Neck: Normal range of motion. Neck supple. Cardiovascular: Normal rate and regular rhythm. Pulmonary/Chest: Effort normal. No stridor. No respiratory distress. She has no wheezes. Abdominal: Soft. She exhibits no distension and no mass. There is no tenderness. There is no guarding. Genitourinary:  
Genitourinary Comments: Ecchymosis of mons pubis 4x4 cm, buttocks where pt points to area of bleeding with no abrasion or active bleeding Musculoskeletal: Normal range of motion. She exhibits no edema. Neurological: She is alert and oriented to person, place, and time. She displays normal reflexes. No cranial nerve deficit. Coordination normal.  
Skin: Skin is warm and dry. She is not diaphoretic. Psychiatric: She has a normal mood and affect. Her behavior is normal.  
  
 
MDM Number of Diagnoses or Management Options Diagnosis management comments: Patient seen on the 18th of this month with PCP and labs. Her hemoglobin had gone from 12.42 9.2 and she was transfused on the 24th. Her platelet count was down to 24 at that time. Blood and platelet transfusions were patient spontaneous bleeding and symptomatic anemia and declined over 3 days despite transfusion so need admission to see oncology. Amount and/or Complexity of Data Reviewed Clinical lab tests: reviewed and ordered Review and summarize past medical records: yes Discuss the patient with other providers: yes Independent visualization of images, tracings, or specimens: yes (SR no ST elevation normal QRS) Risk of Complications, Morbidity, and/or Mortality Presenting problems: high Diagnostic procedures: moderate Management options: high Patient Progress Patient progress: improved Procedures

## 2019-04-29 ENCOUNTER — HOSPITAL ENCOUNTER (OUTPATIENT)
Dept: LAB | Age: 74
DRG: 809 | End: 2019-04-29
Payer: MEDICARE

## 2019-04-29 ENCOUNTER — APPOINTMENT (OUTPATIENT)
Dept: LAB | Age: 74
DRG: 809 | End: 2019-04-29
Payer: MEDICARE

## 2019-04-29 LAB
ALBUMIN SERPL-MCNC: 3.7 G/DL (ref 3.2–4.6)
ALBUMIN/GLOB SERPL: 1 {RATIO} (ref 1.2–3.5)
ALP SERPL-CCNC: 86 U/L (ref 50–136)
ALT SERPL-CCNC: 25 U/L (ref 12–65)
ANION GAP SERPL CALC-SCNC: 8 MMOL/L (ref 7–16)
AST SERPL-CCNC: 20 U/L (ref 15–37)
BILIRUB SERPL-MCNC: 0.4 MG/DL (ref 0.2–1.1)
BLASTS NFR BLD MANUAL: 7 %
BLD PROD TYP BPU: NORMAL
BPU ID: NORMAL
BUN SERPL-MCNC: 13 MG/DL (ref 8–23)
CALCIUM SERPL-MCNC: 7.9 MG/DL (ref 8.3–10.4)
CHLORIDE SERPL-SCNC: 110 MMOL/L (ref 98–107)
CO2 SERPL-SCNC: 25 MMOL/L (ref 21–32)
CREAT SERPL-MCNC: 0.76 MG/DL (ref 0.6–1)
DIFFERENTIAL METHOD BLD: ABNORMAL
ERYTHROCYTE [DISTWIDTH] IN BLOOD BY AUTOMATED COUNT: 16.7 % (ref 11.9–14.6)
GLOBULIN SER CALC-MCNC: 3.6 G/DL (ref 2.3–3.5)
GLUCOSE SERPL-MCNC: 92 MG/DL (ref 65–100)
HCT VFR BLD AUTO: 25.1 % (ref 35.8–46.3)
HGB BLD-MCNC: 8.5 G/DL (ref 11.7–15.4)
LDH SERPL L TO P-CCNC: 319 U/L (ref 110–210)
LYMPHOCYTES # BLD: 1.6 K/UL (ref 0.5–4.6)
LYMPHOCYTES NFR BLD MANUAL: 56 % (ref 16–44)
MAGNESIUM SERPL-MCNC: 2.4 MG/DL (ref 1.8–2.4)
MCH RBC QN AUTO: 33.5 PG (ref 26.1–32.9)
MCHC RBC AUTO-ENTMCNC: 33.9 G/DL (ref 31.4–35)
MCV RBC AUTO: 98.8 FL (ref 79.6–97.8)
MONOCYTES # BLD: 0.4 K/UL (ref 0.1–1.3)
MONOCYTES NFR BLD MANUAL: 13 % (ref 3–9)
MYELOCYTES NFR BLD MANUAL: 7 %
NEUTS BAND NFR BLD MANUAL: 3 % (ref 0–10)
NEUTS SEG # BLD: 0.7 K/UL (ref 1.7–8.2)
NEUTS SEG NFR BLD MANUAL: 14 % (ref 47–75)
NRBC # BLD: 0.02 K/UL (ref 0–0.2)
PHOSPHATE SERPL-MCNC: 3.4 MG/DL (ref 2.3–3.7)
PLATELET # BLD AUTO: 37 K/UL (ref 150–450)
PLATELET COMMENTS,PCOM: ABNORMAL
PMV BLD AUTO: 9.6 FL (ref 9.4–12.3)
POTASSIUM SERPL-SCNC: 3.5 MMOL/L (ref 3.5–5.1)
PROT SERPL-MCNC: 7.3 G/DL (ref 6.3–8.2)
RBC # BLD AUTO: 2.54 M/UL (ref 4.05–5.2)
RBC MORPH BLD: ABNORMAL
SODIUM SERPL-SCNC: 143 MMOL/L (ref 136–145)
STATUS OF UNIT,%ST: NORMAL
UNIT DIVISION, %UDIV: 0
URATE SERPL-MCNC: 4.6 MG/DL (ref 2.6–6)
WBC # BLD AUTO: 2.9 K/UL (ref 4.3–11.1)
WBC MORPH BLD: SLIGHT

## 2019-04-29 PROCEDURE — 83735 ASSAY OF MAGNESIUM: CPT

## 2019-04-29 PROCEDURE — 65660000000 HC RM CCU STEPDOWN

## 2019-04-29 PROCEDURE — P9037 PLATE PHERES LEUKOREDU IRRAD: HCPCS

## 2019-04-29 PROCEDURE — 74011250637 HC RX REV CODE- 250/637: Performed by: NURSE PRACTITIONER

## 2019-04-29 PROCEDURE — 74011250637 HC RX REV CODE- 250/637: Performed by: FAMILY MEDICINE

## 2019-04-29 PROCEDURE — 85025 COMPLETE CBC W/AUTO DIFF WBC: CPT

## 2019-04-29 PROCEDURE — 84550 ASSAY OF BLOOD/URIC ACID: CPT

## 2019-04-29 PROCEDURE — 36430 TRANSFUSION BLD/BLD COMPNT: CPT

## 2019-04-29 PROCEDURE — 74011250637 HC RX REV CODE- 250/637: Performed by: INTERNAL MEDICINE

## 2019-04-29 PROCEDURE — 99233 SBSQ HOSP IP/OBS HIGH 50: CPT | Performed by: INTERNAL MEDICINE

## 2019-04-29 PROCEDURE — 77030019934 HC DRSG VAC ASST KCON -B

## 2019-04-29 PROCEDURE — 97530 THERAPEUTIC ACTIVITIES: CPT

## 2019-04-29 PROCEDURE — 83615 LACTATE (LD) (LDH) ENZYME: CPT

## 2019-04-29 PROCEDURE — 86644 CMV ANTIBODY: CPT

## 2019-04-29 PROCEDURE — 97165 OT EVAL LOW COMPLEX 30 MIN: CPT

## 2019-04-29 PROCEDURE — 84100 ASSAY OF PHOSPHORUS: CPT

## 2019-04-29 PROCEDURE — 97161 PT EVAL LOW COMPLEX 20 MIN: CPT

## 2019-04-29 PROCEDURE — 36415 COLL VENOUS BLD VENIPUNCTURE: CPT

## 2019-04-29 PROCEDURE — 93306 TTE W/DOPPLER COMPLETE: CPT

## 2019-04-29 PROCEDURE — 80053 COMPREHEN METABOLIC PANEL: CPT

## 2019-04-29 RX ORDER — SODIUM CHLORIDE 9 MG/ML
250 INJECTION, SOLUTION INTRAVENOUS AS NEEDED
Status: DISCONTINUED | OUTPATIENT
Start: 2019-04-29 | End: 2019-04-30 | Stop reason: HOSPADM

## 2019-04-29 RX ORDER — DIPHENHYDRAMINE HCL 25 MG
50 CAPSULE ORAL
Status: DISCONTINUED | OUTPATIENT
Start: 2019-04-29 | End: 2019-04-30 | Stop reason: HOSPADM

## 2019-04-29 RX ORDER — CALCIUM CARBONATE 500(1250)
500 TABLET ORAL
Status: DISCONTINUED | OUTPATIENT
Start: 2019-04-29 | End: 2019-04-30 | Stop reason: HOSPADM

## 2019-04-29 RX ORDER — LEVOFLOXACIN 500 MG/1
500 TABLET, FILM COATED ORAL EVERY 24 HOURS
Status: DISCONTINUED | OUTPATIENT
Start: 2019-04-29 | End: 2019-04-30 | Stop reason: HOSPADM

## 2019-04-29 RX ADMIN — Medication 500 MG: at 12:57

## 2019-04-29 RX ADMIN — Medication 500 MG: at 17:40

## 2019-04-29 RX ADMIN — FLUCONAZOLE 200 MG: 100 TABLET ORAL at 08:52

## 2019-04-29 RX ADMIN — ALLOPURINOL 300 MG: 300 TABLET ORAL at 08:52

## 2019-04-29 RX ADMIN — LORAZEPAM 1 MG: 1 TABLET ORAL at 21:38

## 2019-04-29 RX ADMIN — Medication 500 MG: at 08:52

## 2019-04-29 RX ADMIN — LEVOFLOXACIN 500 MG: 500 TABLET, FILM COATED ORAL at 17:40

## 2019-04-29 RX ADMIN — DIPHENHYDRAMINE HYDROCHLORIDE 50 MG: 25 CAPSULE ORAL at 21:38

## 2019-04-29 RX ADMIN — ESCITALOPRAM OXALATE 10 MG: 10 TABLET ORAL at 08:52

## 2019-04-29 RX ADMIN — ACYCLOVIR 400 MG: 800 TABLET ORAL at 17:40

## 2019-04-29 RX ADMIN — PRAVASTATIN SODIUM 10 MG: 20 TABLET ORAL at 21:38

## 2019-04-29 RX ADMIN — ACYCLOVIR 400 MG: 800 TABLET ORAL at 08:52

## 2019-04-29 NOTE — PROGRESS NOTES
Chart screened by  for discharge planning. No needs identified at this time. Please consult  if any new issues arise Care Management Interventions Mode of Transport at Discharge: BLS Transition of Care Consult (CM Consult): Discharge Planning Current Support Network: Own Home, Family Lives [place] Confirm Follow Up Transport: Family Plan discussed with Pt/Family/Caregiver: Yes Freedom of Choice Offered: Yes [employer] Information Provided?: No 
Discharge Location Discharge Placement: Unable to determine at this time

## 2019-04-29 NOTE — PROGRESS NOTES
Notified by IR that pt is unable to have port placed today d/t scheduling demands. Rescheduled for tomorrow 4/30 and NPO at MD order placed.

## 2019-04-29 NOTE — PROGRESS NOTES
Problem: Falls - Risk of 
Goal: *Absence of Falls Description Document Garethcaty Mcdonaldlizabeth Fall Risk and appropriate interventions in the flowsheet.  
Outcome: Progressing Towards Goal

## 2019-04-29 NOTE — PROGRESS NOTES
[facility] Inpatient Hematology / Oncology Progress NoteAdmission Date: 2019  9:04 PM 
Reason for Admission/Hospital Course: Thrombocytopenia ([address] [D69.6] 24 Hour Events: 
Consolidated [place] in [employer] [facility] records requested Echo EF 55-60% ROS: 
Constitutional:  negative for fever, chills, +weakness, malaise, fatigue. CV: negative for chest pain, palpitations, edema. Respiratory: negative for dyspnea, cough, wheezing. GI: negative for nausea, abdominal pain, diarrhea. 10 point review of systems is otherwise negative with the exception of the elements mentioned above in the HPI. No Known Allergies OBJECTIVE: 
Patient Vitals for the past 8 hrs: 
 BP Temp Pulse Resp SpO2  
19 0754 135/59 98.9 °F (37.2 °C) 81 19 94 % 19 0322 132/54 97.6 °F (36.4 °C) 68 19 94 % Temp (24hrs), Av.9 °F (36.6 °C), Min:97.4 °F (36.3 °C), Max:98.9 °F (37.2 °C) 
 
 0701 -  1900 In: 360 [P.O.:360] Out: - Physical Exam: 
Constitutional: Well developed, well nourished female in no acute distress, sitting comfortably in the hospital bedside chair. Spouse at bedside. HEENT: Normocephalic and atraumatic. Oropharynx is clear, mucous membranes are moist. Extraocular muscles are intact. Sclerae anicteric. Skin Warm and dry. No bruising and no rash noted. No erythema. No pallor. Respiratory Lungs are clear to auscultation bilaterally without wheezes, rales or rhonchi, normal air exchange without accessory muscle use. CVS Normal rate, regular rhythm and normal S1 and S2. No murmurs, gallops, or rubs. Abdomen Soft, nontender and nondistended, normoactive bowel sounds. No palpable mass. No hepatosplenomegaly. Neuro Grossly nonfocal with no obvious sensory or motor deficits. MSK Normal range of motion in general.  No edema and no tenderness. Psych Appropriate mood and affect. Anxious Labs: 
   
Recent Labs  
  19 4111 04/28/19 
4379 04/27/19 2010 WBC 2.9* 2.8* 2.8*  
RBC 2.54* 2.54* 2.62* HGB 8.5* 8.6* 8.8* HCT 25.1* 24.8* 25.5* MCV 98.8* 97.6 97.3 MCH 33.5* 33.9* 33.6*  
MCHC 33.9 34.7 34.5  
RDW 16.7* 16.2* 15.7* PLT 37* 46* 21* GRANS 14* 13* 17* LYMPH 56* 60* 41 MONOS 13* 22* 37* EOS  --  3 1 BASOS  --   --  0 IG  --   --  4  
DF MANUAL MANUAL AUTOMATED ANEU 0.7* 0.4* 0.5* ABL 1.6 1.7 1.2 ABM 0.4 0.6 1.0 ALEKSANDR  --  0.1 0.0 ABB  --   --  0.0 AIG  --   --  0.1 Recent Labs  
  04/29/19 
0517 04/27/19 2010  141  
K 3.5 4.3 * 108* CO2 25 25 AGAP 8 8 GLU 92 105* BUN 13 16 CREA 0.76 0.82 GFRAA >60 >60 GFRNA >60 >60  
CA 7.9* 8.6 SGOT 20 38* AP 86 95  
TP 7.3 8.1 ALB 3.7 4.0  
GLOB 3.6* 4.1* AGRAT 1.0* 1.0*  
MG 2.4  --   
PHOS 3.4  --   
 
 
 
Imaging: 
 
Medications: 
Current Facility-Administered Medications Medication Dose Route Frequency  calcium carbonate (OS-CHRIS) tablet 500 mg [elemental]  500 mg Oral TID WITH MEALS  
 0.9% sodium chloride infusion 250 mL  250 mL IntraVENous PRN  
 escitalopram oxalate (LEXAPRO) tablet 10 mg  10 mg Oral DAILY  LORazepam (ATIVAN) tablet 1 mg  1 mg Oral QHS  pravastatin (PRAVACHOL) tablet 10 mg  10 mg Oral QHS  diphenhydrAMINE (BENADRYL) capsule 25 mg  25 mg Oral Q4H PRN  
 acyclovir (ZOVIRAX) tablet 400 mg  400 mg Oral BID  fluconazole (DIFLUCAN) tablet 200 mg  200 mg Oral DAILY  allopurinol (ZYLOPRIM) tablet 300 mg  300 mg Oral DAILY  LORazepam (ATIVAN) tablet 1 mg  1 mg Oral Q6H PRN  prochlorperazine (COMPAZINE) injection 5-10 mg  5-10 mg IntraVENous Q6H PRN  
 HYDROcodone-acetaminophen (NORCO) 5-325 mg per tablet 1 Tab  1 Tab Oral Q6H PRN  
 morphine injection 2 mg  2 mg IntraVENous Q4H PRN  polyethylene glycol (MIRALAX) packet 17 g  17 g Oral DAILY PRN  
 0.9% sodium chloride infusion 250 mL  250 mL IntraVENous PRN  
  naloxone (NARCAN) injection 0.4 mg  0.4 mg IntraVENous PRN  
 ondansetron (ZOFRAN) injection 4 mg  4 mg IntraVENous Q4H PRN  
 
 
ASSESSMENT: 
 
Problem List  Never Reviewed Codes Class Noted AML (acute myeloblastic leukemia) ([facility] ICD-10-CM: C92.00 ICD-9-CM: 205.00  4/28/2019 Weakness generalized ICD-10-CM: R53.1 ICD-9-CM: 780.79  4/28/2019 Pancytopenia ([facility] [address] ICD-10-CM: X34.182 ICD-9-CM: 284.19  4/28/2019 Thrombocytopenia ([facility] ICD-10-CM: D69.6 ICD-9-CM: 287.5  4/27/2019 PLAN: 
AML  
- dx via [facility] at [facility] on 4/24. Will need to request BMbx records tomorrow - [facility] 
- Echo ordered - Check LDH, uric acid, Hep panel, HIV, Vit B12, Vit D 
4/29 EF . . B12 998. Uric acid 4.6. Hepatitis, HIV, Vit D pending. Records requested from [facility]. Being considered for clinical trial.  
  
Pancytopenia secondary to disease - Transfuse prn per Alexander SOPs 
  
Anxiety 
- Con't Lexapro - Ativan ordered prn Acyclovir, Diflucan, Levaquin, and allopurinol prophylaxis 
  
Continue home meds Alexander SOPs SCDs for DVT prophylaxis (AC contraindicated d/t thrombocytopenia) Goals and plan of care reviewed with the patient. All questions answered to the best of our ability. Aaliyah Romero NP [facility] Hematology & Oncology [address] [address] Office : [phone] Fax : [phone] Attending Addendum: 
I have personally performed a face to face diagnostic evaluation on this patient. I have reviewed and agree with the care plan as documented above by Aaliyah Romero N.P.  My findings are as follows: Patient appears lethargic, heart rate regular without murmurs, abdomen is non-tender, bowel sounds are positive. 68 female, retired Cypriot  h/o depression (on lexapro), dyslipidemia, OA now admitted after recent evaluation for pancytopenia w/ Dr Rusty De La Paz.  BM biopsy per discussion w referring provider consistent w AML (final path, cytogenetics and molecular pending). Diagnosis, prognosis and therapy options including curative and palliative approach discussed. She would like to pursue induction chemo going forward. Will get final path, and screen for clinical trials. Await 2D ECHO. Needs access. Continue w allopurinol, as well as prophylactic abx including acyclovir, diflucan and levaquin. Transfuse as needed. Total time 35 min 50% in direct consultation about the patient's diagnosis and management Taj Morales MD 
[facility] Hematology/Oncology [address] [place] [address] Office : [phone] Fax : [phone]

## 2019-04-29 NOTE — PROGRESS NOTES
Problem: Mobility Impaired (Adult and Pediatric) Goal: *Acute Goals and Plan of Care (Insert Text) Description 1. Ms. Tessa Sanches will perform gait >1000 ft independently in 14 days. 2.  Ms. Tessa Sanches will perform therex in standing x 25 reps in 14 days using [employer] cris. Outcome: Progressing Towards Goal 
  
PHYSICAL THERAPY: Initial Assessment and Daily Note 4/29/2019 INPATIENT: PT Visit Days : 1 [place] / [place] / Product Type: Medicare /   
  
NAME/AGE/GENDER: Melany Cooks is a 68 y.o. female PRIMARY DIAGNOSIS: Thrombocytopenia (HCC) [D69.6] <principal problem not specified> 
 <principal problem not specified> Procedure(s) (LRB): 
IR ANESTHESIA/CHEST PORT ROOM 529 (N/A) ICD-10: Treatment Diagnosis:  
 Generalized Muscle Weakness (M62.81) Difficulty in walking, Not elsewhere classified (R26.2) Precaution/Allergies: 
Patient has no known allergies. ASSESSMENT:  
 
Ms. Tessa Sanches presents in good spirits today. She is waiting on port placement before starting her chemo. She admits she is a bit tired but is so happy she is not nauseated today. Today she is able to ambulate 400 ft with mask in place. We talked about the roll of PT while she is here. Will start off with 2 times a week while she is functioning at a higher level. Provide exercise program and increase her activity tolerance. We talked about how after she starts her chemo she may start to feel more tired and weak and that is when we will increase frequency. Goal of PT for her is so that she will be able to return home functioning as independently as she is right now with increased activity tolerance. At base line she is on the goal.  Very outgoing and active. This section established at most recent assessment PROBLEM LIST (Impairments causing functional limitations): 
Decreased Strength Decreased Ambulation Ability/Technique Decreased Activity Tolerance INTERVENTIONS PLANNED: (Benefits and precautions of physical therapy have been discussed with the patient.) Gait Training Therapeutic Activites Therapeutic Exercise/Strengthening TREATMENT PLAN: Frequency/Duration: 2 times a week for duration of hospital stay Rehabilitation Potential For Stated Goals: Good REHAB RECOMMENDATIONS (at time of discharge pending progress):   
Placement: It is my opinion, based on this patient's performance to date, that Ms. Veronica Calderon may benefit from being discharged with NO further skilled therapy due to expectation that she will be independent at time of discharge. . 
Equipment:  
None at this time HISTORY:  
History of Present Injury/Illness (Reason for Referral): 
Patient is a 68 y.o. female who presented to the ED for cc nausea, poor PO intake, ecchymosis, and bleeding. Patient recently had a bone marrow biopsy on 4/24 that revealed AML. Last CBC showed Hg 9.2 and platelet level of 24 on 4/18/19. Since today, patient noted bleeding from her left buttock cheek and had increased shortness of breathe. Past Medical History/Comorbidities: Ms. Veronica Calderon  has a past medical history of Cancer ([facility] [address] Ms. Veronica Calderon  has no past surgical history on file. Social History/Living Environment:  
Home Environment: Private residence # Steps to Enter: 0 One/Two Story Residence: One story Living Alone: No 
Support Systems: Spouse/Significant Other/Partner Patient Expects to be Discharged to[de-identified] Private residence Current DME Used/Available at Home: Cane, straight, Shower chair, Grab bars, Raised toilet seat Tub or Shower Type: Shower Prior Level of Function/Work/Activity: 
independent 
age Number of Personal Factors/Comorbidities that affect the Plan of Care: 1-2: MODERATE COMPLEXITY EXAMINATION:  
Most Recent Physical Functioning:  
Gross Assessment: 
AROM: Within functional limits PROM: Within functional limits Strength: Within functional limits Coordination: Within functional limits Tone: Normal 
Sensation: Intact Posture: 
Posture (WDL): Within defined limits Balance: 
Sitting: Intact Standing: Intact Bed Mobility: 
Rolling: Independent Supine to Sit: Independent Sit to Supine: Independent Wheelchair Mobility: 
  
Transfers: 
Sit to Stand: Independent Stand to Sit: Independent Gait: 
  
Distance (ft): 400 Feet (ft) Ambulation - Level of Assistance: Independent Body Structures Involved: Muscles Body Functions Affected: Movement Related Activities and Participation Affected: Mobility Number of elements that affect the Plan of Care: 3: MODERATE COMPLEXITY CLINICAL PRESENTATION:  
Presentation: Stable and uncomplicated: LOW COMPLEXITY CLINICAL DECISION [id number] [place] [address] AM-PAC? ?6 Clicks? Basic Mobility Inpatient Short Form How much difficulty does the patient currently have. .. Unable A Lot A Little None 1. Turning over in bed (including adjusting bedclothes, sheets and blankets)? ? 1   ? 2   ? 3   ? 4  
2. Sitting down on and standing up from a chair with arms ( e.g., wheelchair, bedside commode, etc.)   ? 1   ? 2   ? 3   ? 4  
3. Moving from lying on back to sitting on the side of the bed?   ? 1   ? 2   ? 3   ? 4 How much help from another person does the patient currently need. .. Total A Lot A Little None 4. Moving to and from a bed to a chair (including a wheelchair)? ? 1   ? 2   ? 3   ? 4  
5. Need to walk in hospital room? ? 1   ? 2   ? 3   ? 4  
6. Climbing 3-5 steps with a railing? ? 1   ? 2   ? 3   ? 4  
© , Trustees of [address] [place] [address], under license to [employer]. All rights reserved Score:  Initial: 24 Most Recent: X (Date: -- ) Interpretation of Tool:  Represents activities that are increasingly more difficult (i.e. Bed mobility, Transfers, Gait). Medical Necessity:    
Patient is expected to demonstrate progress in functional technique to increase independence with mobility and gait with good activity tolerance. . 
Reason for Services/Other Comments: 
Patient continues to require present interventions due to patient's inability to function at baseline. .  
Use of outcome tool(s) and clinical judgement create a POC that gives a: Clear prediction of patient's progress: LOW COMPLEXITY  
  
 
 
 
TREATMENT:  
(In addition to Assessment/Re-Assessment sessions the following treatments were rendered) Pre-treatment Symptoms/Complaints:  a little tired Pain: Initial:  
Pain Intensity 1: 0  Post Session:  no problems Therapeutic Activity: (    10): Therapeutic activities including Chair transfers, Ambulation on level ground and   to improve mobility. Required minimal   to promote motor control of bilateral, upper extremity(s), lower extremity(s). Braces/Orthotics/Lines/Etc:  
O2 Device: Room air Treatment/Session Assessment:   
Response to Treatment:  good Interdisciplinary Collaboration:  
Registered Nurse After treatment position/precautions:  
Up in chair Call light within reach RN notified Compliance with Program/Exercises: Will assess as treatment progresses Recommendations/Intent for next treatment session: \"Next visit will focus on advancements to more challenging activities and reduction in assistance provided\". Total Treatment Duration: PT Patient Time In/Time Out Time In: 1300 Time Out: 4875 Dang Mccormack, PT

## 2019-04-29 NOTE — PROGRESS NOTES
EOS: Spoke with IR this morning who states pt will be rescheduled for port placement tomorrow 4/30. NPO at MD. Pt in good spirits this shift with NO complaints of nausea. 2D Echo complete with 55-60% EF. Home CPAP at bedside. Spoke with NP regarding pt's night time sleep meds (pt states she typically takes Tylenol PM every evening along with Ativan 1mg PO). Order for Benadryl 50mg PO at bedtime PRN to be given with Ativan PO. No concerns voiced at this time. Pt resting quietly. Continue current POC.

## 2019-04-29 NOTE — PROGRESS NOTES
Pt states at home she takes tylenol PM with her ativan. Tylenol PM has 50 mg benadryl in it. Call placed to [facility] for order.

## 2019-04-29 NOTE — PROGRESS NOTES
Nutrition Reason for assessment: Consult received for general nutrition management and supplementation Coleen Lee NP)\ Assessment:  
Diet: DIET REGULAR 
DIET NPO Food/Nutrition Patient History:  Pt newly diagnosed with AML. She reports poor meal intake over the weekend d/t nausea and decreased appetite. Pt states that this has resolved and she ate 100% of breakfast this morning as she was not nauseated. She states that prior to Thursday of last week she had a \"voracious\" appetite. Pt states that she has actually had weight gain. She and her  eat three meals per day and snacks. They enjoy eating out. No c/o barriers to meal intake when nausea is not present. Observed an ensure at bedside. Pt states that she drank one over the weekend when she was not eating well. She was not previously consuming any ONS. Anthropometrics:Height: 5' 6\" (167.6 cm),  Weight: 87.6 kg (193 lb 1.6 oz), Weight Source: Standing scale (comment), Body mass index is 31.17 kg/m². BMI class of overweight. Macronutrient needs: EER:  5990-6127 kcal /day (15-20 kcal/kg listed BW) EPR:   grams protein/day (1-1.2 grams/kg listed BW) Intake/Comparative Standards: Average intake for past 2 day(s)/3 recorded meal(s): 70%. This potentially meets ~100% of kcal and ~76% of protein needs Nutrition Diagnosis: No nutrition diagnosis. Intervention: 
Meals and snacks: Continue current diet. Provided her with a menu. Encouraged pt to request zofran prior to meal times. Nutrition Supplement Therapy: none at this time. Reinforced that they are available on the floor if she is unable to eat a meal.  
Discharge nutrition plan: Too soon to determine. Aster [address], [address], [address] [place] [address], [phone]

## 2019-04-29 NOTE — PROGRESS NOTES
Problem: Self Care Deficits Care Plan (Adult) Goal: *Acute Goals and Plan of Care (Insert Text) Description 1. Pt will demonstrate independent with HEP to promote increased BUE strength and overall endurance for ADLs 2. Pt will tolerate 23 minutes functional activity to promote increased endurance for ADLs 3. Pt will independently demonstrate/ verbalize 2+ Energy conservation techniques to promote increased endurance for ADLs Timeframe: 7 days Outcome: Progressing Towards Goal 
  
OCCUPATIONAL THERAPY: Initial Assessment 4/29/2019 INPATIENT:   
[place] / Plan[place] / Product Type: Medicare /  
  
NAME/AGE/GENDER: Jo-Ann Edwards is a 68 y.o. female PRIMARY DIAGNOSIS:  Thrombocytopenia (HCC) [D69.6] <principal problem not specified> 
 <principal problem not specified> 
 
  
  
ICD-10: Treatment Diagnosis:  
 Generalized Muscle Weakness (M62.81) Precautions/Allergies: 
   Patient has no known allergies. ASSESSMENT:  
Ms. 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 Pt lives with her  and is fully independent at baseline, stays active doing housework and all of the IADLs. Pt does not use an AD for mobility but owns a cane. This session, pt presented with slightly decreased strength and endurance. Pt demonstrated independence with ADLs and mobility for ADLs, however is at risk for functional decline d/t plans for prolonged stay 4-6 weeks for chemo. Will follow while hospitalized to address deficits, maximize function, and prevent decline. This section established at most recent assessment PROBLEM LIST (Impairments causing functional limitations): 
Decreased Strength Decreased Activity Tolerance Increased Fatigue INTERVENTIONS PLANNED: (Benefits and precautions of occupational therapy have been discussed with the patient.) Adaptive equipment training Therapeutic activity Therapeutic exercise TREATMENT PLAN: Frequency/Duration: Follow patient 1 time/ week to address above goals. Rehabilitation Potential For Stated Goals: Excellent REHAB RECOMMENDATIONS (at time of discharge pending progress):   
Placement: 
Recommend home w/ family Equipment:  
None at this time OCCUPATIONAL PROFILE AND HISTORY:  
History of Present Injury/Illness (Reason for Referral): 
See H&P Past Medical History/Comorbidities: Ms. Sebastien Weller  has a past medical history of Cancer ([facility] [address] Ms. Sebastien Wleler  has no past surgical history on file. Social History/Living Environment:  
Home Environment: Private residence # Steps to Enter: 0 One/Two Story Residence: One story Living Alone: No 
Support Systems: Spouse/Significant Other/Partner Patient Expects to be Discharged to[de-identified] Private residence Current DME Used/Available at Home: Cane, straight, Shower chair, Grab bars, Raised toilet seat, Other (comment)(hand held shower head) Tub or Shower Type: Shower Prior Level of Function/Work/Activity: 
Independent, lives w/  Number of Personal Factors/Comorbidities that affect the Plan of Care: Brief history (0):  LOW COMPLEXITY ASSESSMENT OF OCCUPATIONAL PERFORMANCE[de-identified]  
Activities of Daily Living:  
Basic ADLs (From Assessment) Complex ADLs (From Assessment) Feeding: Independent Oral Facial Hygiene/Grooming: Independent Bathing: Independent Upper Body Dressing: Independent Lower Body Dressing: Independent Toileting: Independent Instrumental ADL Meal Preparation: Minimum assistance Homemaking: Minimum assistance Grooming/Bathing/Dressing Activities of Daily Living Cognitive Retraining Safety/Judgement: Awareness of environment; Fall prevention Bed/Mat Mobility Sit to Stand: Independent Stand to Sit: Independent Bed to Chair: Independent Most Recent Physical Functioning:  
Gross Assessment: 
AROM: Within functional limits Strength: Generally decreased, functional 
 Coordination: Within functional limits Posture: 
  
Balance: 
Sitting: Intact Standing: Intact Bed Mobility: 
  
Wheelchair Mobility: 
  
Transfers: 
Sit to Stand: Independent Stand to Sit: Independent Bed to Chair: Independent Patient Vitals for the past 6 hrs: 
 BP BP Patient Position SpO2 Pulse 04/29/19 0754 135/59 At rest 94 % 81  
04/29/19 1127 147/76 Sitting 96 % 96 Mental Status Neurologic State: Alert Orientation Level: Oriented X4 Cognition: Appropriate decision making, Appropriate for age attention/concentration, Appropriate safety awareness, Follows commands Perception: Appears intact Perseveration: No perseveration noted Safety/Judgement: Awareness of environment, Fall prevention Physical Skills Involved: 
Strength Activity Tolerance Cognitive Skills Affected (resulting in the inability to perform in a timely and safe manner): 
none  Psychosocial Skills Affected: 
Habits/Routines Number of elements that affect the Plan of Care: 1-3:  LOW COMPLEXITY CLINICAL DECISION MAKING:  
[facility] AM-PAC? ?6 Clicks? Daily Activity Inpatient Short Form How much help from another person does the patient currently need. .. Total A Lot A Little None 1. Putting on and taking off regular lower body clothing? ? 1   ? 2   ? 3   ? 4  
2. Bathing (including washing, rinsing, drying)? ? 1   ? 2   ? 3   ? 4  
3. Toileting, which includes using toilet, bedpan or urinal?   ? 1   ? 2   ? 3   ? 4  
4. Putting on and taking off regular upper body clothing? ? 1   ? 2   ? 3   ? 4  
5. Taking care of personal grooming such as brushing teeth? ? 1   ? 2   ? 3   ? 4  
6. Eating meals? ? 1   ? 2   ? 3   ? 4  
© 2007, Trustees of [facility], under license to [employer]. All rights reserved Score:  Initial: 24 Most Recent: X (Date: -- ) Interpretation of Tool:  Represents activities that are increasingly more difficult (i.e. Bed mobility, Transfers, Gait). Medical Necessity:    
Patient demonstrates excellent 
 rehab potential due to higher previous functional level. Reason for Services/Other Comments: 
Patient continues to require skilled intervention due to decreased endurance and functional performance from baseline, high risk for decline while hospitalized Brandy Riser Use of outcome tool(s) and clinical judgement create a POC that gives a: LOW COMPLEXITY  
 
 
 
TREATMENT:  
(In addition to Assessment/Re-Assessment sessions the following treatments were rendered) Pre-treatment Symptoms/Complaints:   
Pain: Initial:  
Pain Intensity 1: 0  Post Session:  0 Assessment/Reassessment only, no treatment provided today Braces/Orthotics/Lines/Etc:  
O2 Device: Room air Treatment/Session Assessment:   
Response to Treatment:  no adverse reaction Interdisciplinary Collaboration: Occupational Therapist 
Registered Nurse After treatment position/precautions:  
Up in chair Bed alarm/tab alert on Call light within reach Family at bedside Compliance with Program/Exercises: Compliant all of the time. Recommendations/Intent for next treatment session: \"Next visit will focus on advancements to more challenging activities and reduction in assistance provided\". Total Treatment Duration: OT Patient Time In/Time Out Time In: 1101 Time Out: 1111 Rosetta Matos OT

## 2019-04-30 ENCOUNTER — ANESTHESIA EVENT (OUTPATIENT)
Dept: SURGERY | Age: 74
DRG: 809 | End: 2019-04-30
Payer: MEDICARE

## 2019-04-30 ENCOUNTER — APPOINTMENT (OUTPATIENT)
Dept: INTERVENTIONAL RADIOLOGY/VASCULAR | Age: 74
DRG: 809 | End: 2019-04-30
Attending: NURSE PRACTITIONER
Payer: MEDICARE

## 2019-04-30 ENCOUNTER — ANESTHESIA (OUTPATIENT)
Dept: SURGERY | Age: 74
DRG: 809 | End: 2019-04-30
Payer: MEDICARE

## 2019-04-30 ENCOUNTER — APPOINTMENT (OUTPATIENT)
Dept: LAB | Age: 74
DRG: 809 | End: 2019-04-30
Payer: MEDICARE

## 2019-04-30 VITALS
HEIGHT: 66 IN | BODY MASS INDEX: 31.34 KG/M2 | DIASTOLIC BLOOD PRESSURE: 55 MMHG | OXYGEN SATURATION: 95 % | SYSTOLIC BLOOD PRESSURE: 153 MMHG | RESPIRATION RATE: 17 BRPM | TEMPERATURE: 98.1 F | WEIGHT: 195 LBS | HEART RATE: 75 BPM

## 2019-04-30 LAB
25(OH)D3+25(OH)D2 SERPL-MCNC: 23 NG/ML (ref 30–100)
ALBUMIN SERPL-MCNC: 3.6 G/DL (ref 3.2–4.6)
ALBUMIN/GLOB SERPL: 1 {RATIO} (ref 1.2–3.5)
ALP SERPL-CCNC: 86 U/L (ref 50–136)
ALT SERPL-CCNC: 23 U/L (ref 12–65)
ANION GAP SERPL CALC-SCNC: 5 MMOL/L (ref 7–16)
APTT PPP: 33.4 SEC (ref 24.7–39.8)
AST SERPL-CCNC: 18 U/L (ref 15–37)
BILIRUB SERPL-MCNC: 0.4 MG/DL (ref 0.2–1.1)
BLASTS NFR BLD MANUAL: 31 %
BUN SERPL-MCNC: 16 MG/DL (ref 8–23)
CALCIUM SERPL-MCNC: 9.1 MG/DL (ref 8.3–10.4)
CHLORIDE SERPL-SCNC: 109 MMOL/L (ref 98–107)
CO2 SERPL-SCNC: 28 MMOL/L (ref 21–32)
CREAT SERPL-MCNC: 0.8 MG/DL (ref 0.6–1)
DIFFERENTIAL METHOD BLD: ABNORMAL
ERYTHROCYTE [DISTWIDTH] IN BLOOD BY AUTOMATED COUNT: 16.1 % (ref 11.9–14.6)
FIBRINOGEN PPP-MCNC: 107 MG/DL (ref 190–501)
GLOBULIN SER CALC-MCNC: 3.5 G/DL (ref 2.3–3.5)
GLUCOSE SERPL-MCNC: 93 MG/DL (ref 65–100)
HAV IGM SERPL QL IA: NEGATIVE
HBV CORE IGM SERPL QL IA: NEGATIVE
HBV SURFACE AG SERPL QL IA: NEGATIVE
HCT VFR BLD AUTO: 24.2 % (ref 35.8–46.3)
HCV AB S/CO SERPL IA: 0.1 S/CO RATIO (ref 0–0.9)
HGB BLD-MCNC: 8.1 G/DL (ref 11.7–15.4)
HIV 1+2 AB+HIV1 P24 AG SERPL QL IA: NON REACTIVE
INR PPP: 1.5
LDH SERPL L TO P-CCNC: 315 U/L (ref 110–210)
LYMPHOCYTES # BLD: 1.4 K/UL (ref 0.5–4.6)
LYMPHOCYTES NFR BLD MANUAL: 42 % (ref 16–44)
MAGNESIUM SERPL-MCNC: 2.2 MG/DL (ref 1.8–2.4)
MCH RBC QN AUTO: 32.8 PG (ref 26.1–32.9)
MCHC RBC AUTO-ENTMCNC: 33.5 G/DL (ref 31.4–35)
MCV RBC AUTO: 98 FL (ref 79.6–97.8)
METAMYELOCYTES NFR BLD MANUAL: 1 %
MONOCYTES # BLD: 0.1 K/UL (ref 0.1–1.3)
MONOCYTES NFR BLD MANUAL: 2 % (ref 3–9)
MYELOCYTES NFR BLD MANUAL: 2 %
NEUTS BAND NFR BLD MANUAL: 1 % (ref 0–10)
NEUTS SEG # BLD: 0.9 K/UL (ref 1.7–8.2)
NEUTS SEG NFR BLD MANUAL: 21 % (ref 47–75)
NRBC # BLD: 0.02 K/UL (ref 0–0.2)
NRBC BLD-RTO: 5 PER 100 WBC
PHOSPHATE SERPL-MCNC: 2.9 MG/DL (ref 2.3–3.7)
PLATELET # BLD AUTO: 79 K/UL (ref 150–450)
PLATELET COMMENTS,PCOM: ABNORMAL
PMV BLD AUTO: 10.7 FL (ref 9.4–12.3)
POTASSIUM SERPL-SCNC: 3.8 MMOL/L (ref 3.5–5.1)
PROT SERPL-MCNC: 7.1 G/DL (ref 6.3–8.2)
PROTHROMBIN TIME: 17.6 SEC (ref 11.7–14.5)
RBC # BLD AUTO: 2.47 M/UL (ref 4.05–5.2)
RBC MORPH BLD: ABNORMAL
SODIUM SERPL-SCNC: 142 MMOL/L (ref 136–145)
URATE SERPL-MCNC: 4.4 MG/DL (ref 2.6–6)
WBC # BLD AUTO: 3.4 K/UL (ref 4.3–11.1)
WBC MORPH BLD: ABNORMAL

## 2019-04-30 PROCEDURE — 77030003560 HC NDL HUBR BARD -A

## 2019-04-30 PROCEDURE — 74011000250 HC RX REV CODE- 250: Performed by: PHYSICIAN ASSISTANT

## 2019-04-30 PROCEDURE — 77030037400 HC ADH TISS HI VISC EXOFIN CHMP -B

## 2019-04-30 PROCEDURE — C1894 INTRO/SHEATH, NON-LASER: HCPCS

## 2019-04-30 PROCEDURE — 77030031139 HC SUT VCRL2 J&J -A

## 2019-04-30 PROCEDURE — 74011250637 HC RX REV CODE- 250/637: Performed by: FAMILY MEDICINE

## 2019-04-30 PROCEDURE — 85384 FIBRINOGEN ACTIVITY: CPT

## 2019-04-30 PROCEDURE — 0JH63XZ INSERTION OF TUNNELED VASCULAR ACCESS DEVICE INTO CHEST SUBCUTANEOUS TISSUE AND FASCIA, PERCUTANEOUS APPROACH: ICD-10-PCS | Performed by: RADIOLOGY

## 2019-04-30 PROCEDURE — 77030018719 HC DRSG PTCH ANTIMIC J&J -A

## 2019-04-30 PROCEDURE — 85610 PROTHROMBIN TIME: CPT

## 2019-04-30 PROCEDURE — 02H633Z INSERTION OF INFUSION DEVICE INTO RIGHT ATRIUM, PERCUTANEOUS APPROACH: ICD-10-PCS | Performed by: RADIOLOGY

## 2019-04-30 PROCEDURE — 74011250636 HC RX REV CODE- 250/636: Performed by: PHYSICIAN ASSISTANT

## 2019-04-30 PROCEDURE — 36415 COLL VENOUS BLD VENIPUNCTURE: CPT

## 2019-04-30 PROCEDURE — 84550 ASSAY OF BLOOD/URIC ACID: CPT

## 2019-04-30 PROCEDURE — 74011250637 HC RX REV CODE- 250/637: Performed by: NURSE PRACTITIONER

## 2019-04-30 PROCEDURE — 85025 COMPLETE CBC W/AUTO DIFF WBC: CPT

## 2019-04-30 PROCEDURE — 85730 THROMBOPLASTIN TIME PARTIAL: CPT

## 2019-04-30 PROCEDURE — 99239 HOSP IP/OBS DSCHRG MGMT >30: CPT | Performed by: INTERNAL MEDICINE

## 2019-04-30 PROCEDURE — 83735 ASSAY OF MAGNESIUM: CPT

## 2019-04-30 PROCEDURE — 77030031131 HC SUT MXN P COVD -B

## 2019-04-30 PROCEDURE — 76060000032 HC ANESTHESIA 0.5 TO 1 HR: Performed by: RADIOLOGY

## 2019-04-30 PROCEDURE — C1788 PORT, INDWELLING, IMP: HCPCS

## 2019-04-30 PROCEDURE — 83615 LACTATE (LD) (LDH) ENZYME: CPT

## 2019-04-30 PROCEDURE — 74011250637 HC RX REV CODE- 250/637: Performed by: INTERNAL MEDICINE

## 2019-04-30 PROCEDURE — 74011250636 HC RX REV CODE- 250/636

## 2019-04-30 PROCEDURE — 80053 COMPREHEN METABOLIC PANEL: CPT

## 2019-04-30 PROCEDURE — 76937 US GUIDE VASCULAR ACCESS: CPT

## 2019-04-30 PROCEDURE — 84100 ASSAY OF PHOSPHORUS: CPT

## 2019-04-30 RX ORDER — SODIUM CHLORIDE, SODIUM LACTATE, POTASSIUM CHLORIDE, CALCIUM CHLORIDE 600; 310; 30; 20 MG/100ML; MG/100ML; MG/100ML; MG/100ML
INJECTION, SOLUTION INTRAVENOUS
Status: DISCONTINUED | OUTPATIENT
Start: 2019-04-30 | End: 2019-04-30 | Stop reason: HOSPADM

## 2019-04-30 RX ORDER — CALCIUM CARBONATE 500(1250)
1 TABLET ORAL
Qty: 90 TAB | Refills: 0 | Status: SHIPPED | OUTPATIENT
Start: 2019-04-30 | End: 2019-06-07

## 2019-04-30 RX ORDER — ACYCLOVIR 400 MG/1
400 TABLET ORAL 2 TIMES DAILY
Qty: 60 TAB | Refills: 0 | Status: ON HOLD | OUTPATIENT
Start: 2019-04-30 | End: 2019-06-07

## 2019-04-30 RX ORDER — PROPOFOL 10 MG/ML
INJECTION, EMULSION INTRAVENOUS
Status: DISCONTINUED | OUTPATIENT
Start: 2019-04-30 | End: 2019-04-30 | Stop reason: HOSPADM

## 2019-04-30 RX ORDER — LEVOFLOXACIN 500 MG/1
500 TABLET, FILM COATED ORAL EVERY 24 HOURS
Qty: 30 TAB | Refills: 0 | Status: ON HOLD | OUTPATIENT
Start: 2019-04-30 | End: 2019-06-07

## 2019-04-30 RX ORDER — SODIUM CHLORIDE, SODIUM LACTATE, POTASSIUM CHLORIDE, CALCIUM CHLORIDE 600; 310; 30; 20 MG/100ML; MG/100ML; MG/100ML; MG/100ML
100 INJECTION, SOLUTION INTRAVENOUS CONTINUOUS
Status: CANCELLED | OUTPATIENT
Start: 2019-04-30

## 2019-04-30 RX ORDER — PROPOFOL 10 MG/ML
INJECTION, EMULSION INTRAVENOUS AS NEEDED
Status: DISCONTINUED | OUTPATIENT
Start: 2019-04-30 | End: 2019-04-30 | Stop reason: HOSPADM

## 2019-04-30 RX ORDER — CEFAZOLIN SODIUM/WATER 2 G/20 ML
2 SYRINGE (ML) INTRAVENOUS ONCE
Status: COMPLETED | OUTPATIENT
Start: 2019-04-30 | End: 2019-04-30

## 2019-04-30 RX ORDER — HEPARIN 100 UNIT/ML
500 SYRINGE INTRAVENOUS
Status: DISCONTINUED | OUTPATIENT
Start: 2019-04-30 | End: 2019-04-30 | Stop reason: HOSPADM

## 2019-04-30 RX ORDER — FLUCONAZOLE 200 MG/1
200 TABLET ORAL DAILY
Qty: 30 TAB | Refills: 0 | Status: ON HOLD | OUTPATIENT
Start: 2019-04-30 | End: 2019-06-07

## 2019-04-30 RX ORDER — ASPIRIN 325 MG
50000 TABLET, DELAYED RELEASE (ENTERIC COATED) ORAL
Qty: 1 CAP | Refills: 0 | Status: SHIPPED | OUTPATIENT
Start: 2019-04-30 | End: 2019-06-07

## 2019-04-30 RX ORDER — SODIUM CHLORIDE 0.9 % (FLUSH) 0.9 %
5-40 SYRINGE (ML) INJECTION AS NEEDED
Status: CANCELLED | OUTPATIENT
Start: 2019-04-30

## 2019-04-30 RX ORDER — ONDANSETRON HYDROCHLORIDE 8 MG/1
8 TABLET, FILM COATED ORAL
Qty: 90 TAB | Refills: 0 | Status: SHIPPED | OUTPATIENT
Start: 2019-04-30

## 2019-04-30 RX ORDER — HEPARIN SODIUM (PORCINE) LOCK FLUSH IV SOLN 100 UNIT/ML 100 UNIT/ML
500 SOLUTION INTRAVENOUS ONCE
Status: COMPLETED | OUTPATIENT
Start: 2019-04-30 | End: 2019-04-30

## 2019-04-30 RX ORDER — HEPARIN SODIUM 200 [USP'U]/100ML
1000 INJECTION, SOLUTION INTRAVENOUS AS NEEDED
Status: DISCONTINUED | OUTPATIENT
Start: 2019-04-30 | End: 2019-04-30 | Stop reason: HOSPADM

## 2019-04-30 RX ORDER — ALLOPURINOL 300 MG/1
300 TABLET ORAL DAILY
Qty: 30 TAB | Refills: 0 | Status: ON HOLD | OUTPATIENT
Start: 2019-04-30 | End: 2019-06-07

## 2019-04-30 RX ORDER — SODIUM CHLORIDE 0.9 % (FLUSH) 0.9 %
5-40 SYRINGE (ML) INJECTION EVERY 8 HOURS
Status: CANCELLED | OUTPATIENT
Start: 2019-04-30

## 2019-04-30 RX ADMIN — ALLOPURINOL 300 MG: 300 TABLET ORAL at 07:46

## 2019-04-30 RX ADMIN — POLYETHYLENE GLYCOL 3350 17 G: 17 POWDER, FOR SOLUTION ORAL at 12:07

## 2019-04-30 RX ADMIN — Medication 500 MG: at 07:47

## 2019-04-30 RX ADMIN — Medication 2 G: at 09:23

## 2019-04-30 RX ADMIN — SODIUM CHLORIDE, SODIUM LACTATE, POTASSIUM CHLORIDE, CALCIUM CHLORIDE: 600; 310; 30; 20 INJECTION, SOLUTION INTRAVENOUS at 09:12

## 2019-04-30 RX ADMIN — Medication 500 MG: at 12:04

## 2019-04-30 RX ADMIN — PROPOFOL 50 MG: 10 INJECTION, EMULSION INTRAVENOUS at 09:26

## 2019-04-30 RX ADMIN — HEPARIN SODIUM (PORCINE) LOCK FLUSH IV SOLN 100 UNIT/ML 500 UNITS: 100 SOLUTION at 09:46

## 2019-04-30 RX ADMIN — LIDOCAINE HYDROCHLORIDE 30 ML: 10; .005 INJECTION, SOLUTION EPIDURAL; INFILTRATION; INTRACAUDAL; PERINEURAL at 09:45

## 2019-04-30 RX ADMIN — PROPOFOL 140 MCG/KG/MIN: 10 INJECTION, EMULSION INTRAVENOUS at 09:26

## 2019-04-30 RX ADMIN — HEPARIN SODIUM 2000 UNITS: 200 INJECTION, SOLUTION INTRAVENOUS at 09:31

## 2019-04-30 RX ADMIN — LEVOFLOXACIN 500 MG: 500 TABLET, FILM COATED ORAL at 15:06

## 2019-04-30 RX ADMIN — ESCITALOPRAM OXALATE 10 MG: 10 TABLET ORAL at 07:47

## 2019-04-30 RX ADMIN — FLUCONAZOLE 200 MG: 100 TABLET ORAL at 07:47

## 2019-04-30 RX ADMIN — ACYCLOVIR 400 MG: 800 TABLET ORAL at 07:46

## 2019-04-30 NOTE — DISCHARGE SUMMARY
[employer] [facility] Hematology & Oncology: Inpatient Hematology / Oncology Discharge Summary NotePatient ID: Severiano De Dios [id number] 
89 y.o. 
1945 Admit Date: 4/27/2019 Discharge Date: 4/30/2019 Admission Diagnoses: Thrombocytopenia ([address] [D69.6] Discharge Diagnoses: 
Principal Diagnosis: <principal problem not specified> Hospital Course: Ms. Tiffanie Wilder is a 68 y.o. female admitted on 4/27/2019. She presented to ED with c/o nausea, poor oral intake, ecchymosis, and bleeding from her L buttock cheek. She also c/o malaise and nausea. She had a recent BMbx at [facility] on 4/24 which revealed AML. She was scheduled to f/u with Dr. Michelle Carlson outpatient as a new patient but was admitted before OP visit. Workup here has included Echo 55-60%,EKG, CBC, CMP, PTT, uric acid, LD.  PT/INR/PTT/fibrinogen collected today prior to discharge for clinical trial workup. She is being considered for clinical trial [id number]. FLT 3 was sent out from BMbx from [facility]. We are discharging patient today while waiting on FLT3 and will readmit on Friday for chemo with BMbx prior. She has been advised to call with any fevers or other concerning symptoms. Consults: 
IP CONSULT TO ONCOLOGY Pertinent Diagnostic Studies:  
Labs:   
Recent Labs 04/30/19 
6548 04/29/19 
7074 04/28/19 
9123 WBC 3.4* 2.9* 2.8* HGB 8.1* 8.5* 8.6* PLT 79* 37* 46* ANEU 0.9* 0.7* 0.4* Recent Labs 04/30/19 
0523 04/29/19 
0517 04/27/19 2010  143 141  
K 3.8 3.5 4.3 * 110* 108* CO2 28 25 25 GLU 93 92 105* BUN 16 13 16 CREA 0.80 0.76 0.82 CA 9.1 7.9* 8.6 SGOT 18 20 38* AP 86 86 95  
TP 7.1 7.3 8.1 ALB 3.6 3.7 4.0  
MG 2.2 2.4  --   
PHOS 2.9 3.4  -- OBJECTIVE: 
Patient Vitals for the past 8 hrs: 
 BP Temp Pulse Resp SpO2  
04/30/19 1056 153/55 98.1 °F (36.7 °C) 75 17 95 % 04/30/19 1025 117/49  71 16 95 % 04/30/19 1020 122/58  70 16 96 % 04/30/19 1015 131/60  70 16 95 % 19 1010 114/53  71 16 95 % 19 1005 113/61  69 16 96 % 19 1000 125/63  68 16 97 % 19 0956 148/63 98.5 °F (36.9 °C) 72 16 98 % 19 0955 148/63  69  97 % 19 0843 182/75 99.4 °F (37.4 °C) 66 (!) 6 96 % 19 0749 141/64 99.3 °F (37.4 °C) 68 18 95 % Temp (24hrs), Av.7 °F (37.1 °C), Min:98 °F (36.7 °C), Max:99.4 °F (37.4 °C) 
 
 07 -  1900 In: 100 [I.V.:100] Out: 805 [Urine:800] Physical Exam: 
Constitutional: Well developed, well nourished female in no acute distress, sitting comfortably on bed HEENT: Normocephalic and atraumatic. Oropharynx is clear, mucous membranes are moist. Extraocular muscles are intact. Sclerae anicteric. Skin Warm and dry. No bruising and no rash noted. No erythema. No pallor. Respiratory Lungs are clear to auscultation bilaterally without wheezes, rales or rhonchi, normal air exchange without accessory muscle use. CVS Normal rate, regular rhythm and normal S1 and S2. No murmurs, gallops, or rubs. Abdomen Soft, nontender and nondistended, normoactive bowel sounds. No palpable mass. No hepatosplenomegaly. Neuro Grossly nonfocal with no obvious sensory or motor deficits. MSK Normal range of motion in general.  No edema and no tenderness. Psych Appropriate mood and affect. ASSESSMENT: 
 
Active Problems: Thrombocytopenia ([facility] [address] (2019) AML (acute myeloblastic leukemia) ([facility] [address] (2019) Weakness generalized (2019) Pancytopenia ([facility] [address] (2019) Current Discharge Medication List  
  
START taking these medications Details  
allopurinol (ZYLOPRIM) 300 mg tablet Take 1 Tab by mouth daily for 30 days. Qty: 30 Tab, Refills: 0  
  
acyclovir (ZOVIRAX) 400 mg tablet Take 1 Tab by mouth two (2) times a day for 30 days.  
Qty: 60 Tab, Refills: 0  
  
calcium carbonate (OS-CHRIS) 500 mg calcium (1,250 mg) tablet Take 1 Tab by mouth three (3) times daily (with meals). Qty: 90 Tab, Refills: 0  
  
fluconazole (DIFLUCAN) 200 mg tablet Take 1 Tab by mouth daily for 30 days. FDA advises cautious prescribing of oral fluconazole in pregnancy. Qty: 30 Tab, Refills: 0  
  
levoFLOXacin (LEVAQUIN) 500 mg tablet Take 1 Tab by mouth every twenty-four (24) hours for 30 days. Qty: 30 Tab, Refills: 0  
  
cholecalciferol (VITAMIN D3) 50,000 unit capsule Take 1 Cap by mouth every seven (7) days for 8 doses. Qty: 1 Cap, Refills: 0  
  
ondansetron hcl (ZOFRAN) 8 mg tablet Take 1 Tab by mouth every eight (8) hours as needed for Nausea. Qty: 90 Tab, Refills: 0 CONTINUE these medications which have NOT CHANGED Details  
vit C/E/zinc/lutein/zeaxanthin ([address] PO) Take 1 Tab by mouth daily. escitalopram oxalate (LEXAPRO) 10 mg tablet Take 10 mg by mouth daily. LORazepam (ATIVAN) 1 mg tablet Take  by mouth nightly. acetaminophen 500 mg tab 500 mg, diphenhydrAMINE 25 mg cap 25 mg Take  by mouth nightly. pravastatin (PRAVACHOL) 10 mg tablet Take  by mouth nightly. STOP taking these medications  
  
 celecoxib (CELEBREX) 200 mg capsule Comments:  
Reason for Stopping:   
   
  
 
 
DISPOSITION: 
Follow-up Appointments Procedures  FOLLOW UP VISIT Appointment in: Other (Specify) Patient will be admitted on Friday am to start chemo. She will have a BMbx prior to starting chemo. Please have patient NPO after midnight on Thursday. Patient will be admitted on Friday am to start chemo. She will have a BMbx prior to starting chemo. Please have patient NPO after midnight on Thursday. Standing Status:   Standing Number of Occurrences:   1 Order Specific Question:   Appointment in Answer: Other (Specify) Aleksander Almanza NP [facility] Hematology & Oncology [id number] [address] Office : [phone] Fax : [phone] Attending Addendum: I have personally performed a face to face diagnostic evaluation on this patient. I have reviewed and agree with the care plan as documented by [facility] Patient appears table, heart rate regular without murmurs, abdomen is non-tender, bowel sounds are positive. 68 female, retired Arabic  h/o depression (on lexapro), dyslipidemia, OA now admitted after recent evaluation for pancytopenia w/ Dr Alan Ferrer. BM biopsy per discussion w referring provider consistent w AML (final path, cytogenetics and molecular pending). Diagnosis, prognosis and therapy options including curative and palliative approach discussed. She would like to pursue induction chemo going forward. Path reviewed: AML, await FLT3 prior to determine clinical trial eligibility. 2D ECHO w normal ED. S/p port. Continue w allopurinol, as well as prophylactic abx including acyclovir, diflucan and levaquin. Clinically stable for discharge. Will plan to likely admit later in week once results of molecular testing back, likely needs repeat bm biopsy at that time. I spent 32 minutes on evaluation, management, counseling and discharge planning on patient. Bushra Gomez MD 
[facility] Hematology/Oncology [id number] [address] Office : [phone] Fax : [phone]

## 2019-04-30 NOTE — PROGRESS NOTES
Problem: Falls - Risk of 
Goal: *Absence of Falls Description Document Rylan Bunting Fall Risk and appropriate interventions in the flowsheet. Outcome: Progressing Towards Goal 
  
Problem: Patient Education: Go to Patient Education Activity Goal: Patient/Family Education Outcome: Progressing Towards Goal

## 2019-04-30 NOTE — ANESTHESIA POSTPROCEDURE EVALUATION
Procedure(s): 
IR ANESTHESIA/CHEST PORT ROOM 529. total IV anesthesia Anesthesia Post Evaluation Multimodal analgesia: multimodal analgesia not used between 6 hours prior to anesthesia start to PACU discharge Patient location during evaluation: bedside Patient participation: complete - patient participated Level of consciousness: awake and alert Pain management: adequate Airway patency: patent Anesthetic complications: no 
Cardiovascular status: acceptable Respiratory status: acceptable, spontaneous ventilation and nonlabored ventilation Hydration status: acceptable Post anesthesia nausea and vomiting:  none Vitals Value Taken Time /53 4/30/2019 10:10 AM  
Temp Pulse 70 4/30/2019 10:11 AM  
Resp 16 4/30/2019 10:10 AM  
SpO2 95 % 4/30/2019 10:11 AM  
Vitals shown include unvalidated device data.

## 2019-04-30 NOTE — PROCEDURES
Department of Interventional Radiology 
[phone] Interventional Radiology Brief Procedure NotePatient: Alley Ace MRN: [id number]  SSN: [id number] YOB: 1945  Age: 68 y.o. Sex: female Date of Procedure: 4/30/2019 Pre-Procedure Diagnosis: AML Post-Procedure Diagnosis: SAME Procedure(s): Venous Chest [facility] Placement Brief Description of Procedure: US, fluoro guided right IJ double lumen port placed Performed By: Moses Cruz PA-C Assistants: None Anesthesia:TIVS/MAC Estimated Blood Loss: Less than 10ml Specimens:  None Implants:  Chest [facility] Placement Findings: catheter tip in right atrium Complications: None Recommendations: ok to use port Follow Up: referring MD 
 
Signed By: Moses Cruz PA-C April 30, 2019

## 2019-04-30 NOTE — PROGRESS NOTES
TRANSFER - OUT REPORT: 
 
Verbal report given to Emiliana Turner RN (name) on Ollie Hodgkin  being transferred to [facility] Medical/Oncology (unit) for routine progression of care Report consisted of patients Situation, Background, Assessment and  
Recommendations(SBAR). Information from the following report(s) SBAR, Kardex, Procedure Summary and MAR was reviewed with the receiving nurse. Lines:  
Venous Access Device BioFlo Dual Chamber Port Right Chest 04/30/19 Upper chest (subclavicular area, right (Active) Central Line Being Utilized Yes 4/30/2019  9:40 AM  
Criteria for Appropriate Use Irritant/vesicant medication 4/30/2019  9:40 AM  
Site Assessment Clean, dry, & intact 4/30/2019  9:40 AM  
Date of Last Dressing Change 04/30/19 4/30/2019  9:40 AM  
Dressing Status Clean, dry, & intact 4/30/2019  9:40 AM  
Dressing Type Transparent 4/30/2019  9:40 AM  
Date Accessed (Medial Site) 04/30/19 4/30/2019  9:40 AM  
Access Time (Medial Site) 0940 4/30/2019  9:40 AM  
Access Needle Size (Site #1) 20 G 4/30/2019  9:40 AM  
Access Needle Length (Medial Site) 0.75 inches 4/30/2019  9:40 AM  
Positive Blood Return (Medial Site) Yes 4/30/2019  9:40 AM  
Action Taken (Medial Site) Flushed 4/30/2019  9:40 AM  
Date Needle Changed (Medial Site) 04/30/19 4/30/2019  9:40 AM  
Date Accessed (Lateral Site) 04/30/19 4/30/2019  9:40 AM  
Access Time (Lateral Site) 0941 4/30/2019  9:40 AM  
Access Needle Size (Lateral Site) 20 G 4/30/2019  9:40 AM  
Access Needle Length (Lateral Site) 0.75 inches 4/30/2019  9:40 AM  
Positive Blood Return (Lateral Site) Yes 4/30/2019  9:40 AM  
Action Taken (Lateral Site) Flushed 4/30/2019  9:40 AM  
Date Needle Changed (Lateral Site) 04/30/19 4/30/2019  9:40 AM  
   
Peripheral IV 04/30/19 Left Antecubital (Active) Site Assessment Clean, dry, & intact 4/30/2019  9:00 AM  
Phlebitis Assessment 0 4/30/2019  9:00 AM  
Infiltration Assessment 0 4/30/2019  9:00 AM  
 Dressing Status Clean, dry, & intact 4/30/2019  9:00 AM  
Hub Color/Line Status Pink 4/30/2019  9:00 AM  
  
 
Opportunity for questions and clarification was provided. Patient transported with: 
 [employer]

## 2019-04-30 NOTE — PROGRESS NOTES
Pt is being discharged. No needs from case management at time of discharge. Pt will be returning at the end of the week to resume treatment. Milestones met Care Management Interventions PCP Verified by CM: Yes Mode of Transport at Discharge: Self Transition of Care Consult (CM Consult): Discharge Planning Discharge Durable Medical Equipment: No 
Physical Therapy Consult: No 
Occupational Therapy Consult: No 
Speech Therapy Consult: No 
Current Support Network: Own Home Confirm Follow Up Transport: Family Plan discussed with Pt/Family/Caregiver: Yes Freedom of Choice Offered: Yes [employer] Information Provided?: No 
Discharge Location Discharge Placement: Home

## 2019-04-30 NOTE — ANESTHESIA PREPROCEDURE EVALUATION
Relevant Problems No relevant active problems Anesthetic History No history of anesthetic complications Review of Systems / Medical History Patient summary reviewed, nursing notes reviewed and pertinent labs reviewed Pulmonary Within defined limits Neuro/Psych Psychiatric history Cardiovascular Hyperlipidemia Exercise tolerance: >4 METS 
  
GI/Hepatic/Renal 
Within defined limits Endo/Other Obesity, cancer (AML) and anemia Other Findings Comments: pancytopenia Physical Exam 
 
Airway Mallampati: II 
 
 
 
 
 Cardiovascular Regular rate and rhythm,  S1 and S2 normal,  no murmur, click, rub, or gallop Dental 
No notable dental hx Pulmonary Breath sounds clear to auscultation Abdominal 
 
 
 
 Other Findings Anesthetic Plan ASA: 3 Anesthesia type: total IV anesthesia Induction: Intravenous Anesthetic plan and risks discussed with: Patient

## 2019-04-30 NOTE — PROGRESS NOTES
Patient to [address] [place] [address] for procedure. Patient is under the care of anesthesia for procedure, as such, please refer to anesthesia notes for intraprocedure vital sign monitoring and intravenous medication administration.

## 2019-04-30 NOTE — PROGRESS NOTES
Problem: Falls - Risk of 
Goal: *Absence of Falls Description Document French Yoseph Fall Risk and appropriate interventions in the flowsheet. Outcome: Progressing Towards Goal 
  
Problem: Patient Education: Go to Patient Education Activity Goal: Patient/Family Education Outcome: Progressing Towards Goal 
  
Problem: General Medical Care Plan Goal: *Vital signs within specified parameters Outcome: Progressing Towards Goal 
Goal: *Labs within defined limits Outcome: Progressing Towards Goal 
Goal: *Absence of infection signs and symptoms Outcome: Progressing Towards Goal 
Goal: *Optimal pain control at patient's stated goal 
Outcome: Progressing Towards Goal 
Goal: *Skin integrity maintained Outcome: Progressing Towards Goal 
Goal: *Fluid volume balance Outcome: Progressing Towards Goal 
Goal: *Optimize nutritional status Outcome: Progressing Towards Goal 
Goal: *Anxiety reduced or absent Outcome: Progressing Towards Goal 
Goal: *Progressive mobility and function (eg: ADL's) Outcome: Progressing Towards Goal 
  
Problem: Patient Education: Go to Patient Education Activity Goal: Patient/Family Education Outcome: Progressing Towards Goal 
  
Problem: Nutrition Deficit Goal: *Optimize nutritional status Outcome: Progressing Towards Goal 
  
Problem: Patient Education: Go to Patient Education Activity Goal: Patient/Family Education Outcome: Progressing Towards Goal 
  
Problem: Patient Education: Go to Patient Education Activity Goal: Patient/Family Education Outcome: Progressing Towards Goal

## 2019-04-30 NOTE — DISCHARGE INSTRUCTIONS
Patient Education        Thrombocytopenia: Care Instructions  Your Care Instructions    Thrombocytopenia is a low number of platelets in the blood. Platelets are the cells that help blood clot. If you don't have enough of them, your blood cannot clot well. So it is harder to stop bleeding. You may have low platelets because your bone marrow does not make them. Or your body's defenses (immune system) may destroy them. Having an enlarged spleen can also reduce the number of platelets in your blood. This is because they can get trapped in the enlarged spleen. Some diseases or medicines may also cause low platelets. But platelets may go back to normal levels if the disease is treated or the medicine is stopped. You may not need treatment if your problem is mild. If you do need treatment, you may have platelets added to your blood. Or you may get medicine to stop the loss of platelets or help your body make them. Follow-up care is a key part of your treatment and safety. Be sure to make and go to all appointments, and call your doctor if you are having problems. It's also a good idea to know your test results and keep a list of the medicines you take. How can you care for yourself at home? · Be safe with medicines. Take your medicines exactly as prescribed. Call your doctor if you think you are having a problem with your medicine. · Do not take aspirin or anti-inflammatory medicines unless your doctor says it is okay. Examples are ibuprofen (Advil, Motrin) and naproxen (Aleve). They may increase the risk of bleeding. · Avoid contact sports or activities that could cause you to fall. When should you call for help? Call 911 anytime you think you may need emergency care. For example, call if:    · You passed out (lost consciousness).     · You have signs of severe bleeding, which includes:  ? You have a severe headache that is different from past headaches.   ? You vomit blood or what looks like coffee grounds. ? Your stools are maroon or very bloody.    Call your doctor now or seek immediate medical care if:    · You are dizzy or lightheaded, or you feel like you may faint.     · You have abnormal bleeding, such as:  ? A nosebleed that you can't easily stop. ? Your stools are black and look like tar, or they have streaks of blood. ? You have blood in your urine. ? You have joint pain. ? You have bruises or blood spots under your skin.    Watch closely for changes in your health, and be sure to contact your doctor if:    · You do not get better as expected. Where can you learn more? Go to http://cece-ahsan.info/. Enter N893 in the search box to learn more about \"Thrombocytopenia: Care Instructions. \"  Current as of: May 6, 2018  Content Version: 11.9  © 1849-5008 [employer]. Care instructions adapted under license by [employer] (which disclaims liability or warranty for this information). If you have questions about a medical condition or this instruction, always ask your healthcare professional. [place] [address] any warranty or liability for your use of this information. DISCHARGE SUMMARY from Nurse    PATIENT INSTRUCTIONS:    After general anesthesia or intravenous sedation, for 24 hours or while taking prescription Narcotics:  · Limit your activities  · Do not drive and operate hazardous machinery  · Do not make important personal or business decisions  · Do  not drink alcoholic beverages  · If you have not urinated within 8 hours after discharge, please contact your surgeon on call.     Report the following to your surgeon:  · Excessive pain, swelling, redness or odor of or around the surgical area  · Temperature over 100.5  · Nausea and vomiting lasting longer than 4 hours or if unable to take medications  · Any signs of decreased circulation or nerve impairment to extremity: change in color, persistent  numbness, tingling, coldness or increase pain  · Any questions    What to do at Home:  Recommended activity: Activity as tolerated. If you experience any of the following symptoms:  Fever greater than 100.5,  Pain or nausea/ vomiting not controlled by your medication,  please follow up with your doctor. *  Please give a list of your current medications to your Primary Care Provider. *  Please update this list whenever your medications are discontinued, doses are      changed, or new medications (including over-the-counter products) are added. *  Please carry medication information at all times in case of emergency situations. These are general instructions for a healthy lifestyle:    No smoking/ No tobacco products/ Avoid exposure to second hand smoke  Surgeon General's Warning:  Quitting smoking now greatly reduces serious risk to your health. Obesity, smoking, and sedentary lifestyle greatly increases your risk for illness    A healthy diet, regular physical exercise & weight monitoring are important for maintaining a healthy lifestyle    You may be retaining fluid if you have a history of heart failure or if you experience any of the following symptoms:  Weight gain of 3 pounds or more overnight or 5 pounds in a week, increased swelling in our hands or feet or shortness of breath while lying flat in bed. Please call your doctor as soon as you notice any of these symptoms; do not wait until your next office visit. Recognize signs and symptoms of STROKE:    F-face looks uneven    A-arms unable to move or move unevenly    S-speech slurred or non-existent    T-time-call 911 as soon as signs and symptoms begin-DO NOT go       Back to bed or wait to see if you get better-TIME IS BRAIN. Warning Signs of HEART ATTACK     Call 911 if you have these symptoms:   Chest discomfort. Most heart attacks involve discomfort in the center of the chest that lasts more than a few minutes, or that goes away and comes back.  It can feel like uncomfortable pressure, squeezing, fullness, or pain.  Discomfort in other areas of the upper body. Symptoms can include pain or discomfort in one or both arms, the back, neck, jaw, or stomach.  Shortness of breath with or without chest discomfort.  Other signs may include breaking out in a cold sweat, nausea, or lightheadedness. Don't wait more than five minutes to call 911 - MINUTES MATTER! Fast action can save your life. Calling 911 is almost always the fastest way to get lifesaving treatment. Emergency Medical Services staff can begin treatment when they arrive -- up to an hour sooner than if someone gets to the hospital by car. The discharge information has been reviewed with the patient. The patient verbalized understanding. Discharge medications reviewed with the patient and appropriate educational materials and side effects teaching were provided.   ___________________________________________________________________________________________________________________________________

## 2019-04-30 NOTE — PROGRESS NOTES
TRANSFER - IN REPORT: 
 
Verbal report received from KAM Davila on [employer]  being received from IR for ordered procedure Report consisted of patients Situation, Background, Assessment and  
Recommendations(SBAR). Information from the following report(s) SBAR, Procedure Summary, MAR and Recent Results was reviewed with the receiving nurse. Opportunity for questions and clarification was provided. Assessment completed upon patients arrival to unit and care assumed.

## 2019-04-30 NOTE — PROGRESS NOTES
END OF SHIFT NOTE: 
 
Intake/Output 04/29 1901 - 04/30 0700 In: 715 [P.O.:240; I.V.:227] Out: 675 Shabnam Helton Voiding: YES Catheter: NO 
Drain:   
 
 
 
 
Stool:  0 occurrences. Stool Assessment Stool Appearance: Formed (04/29/19 0800) Emesis:  0 occurrences. VITAL SIGNS Patient Vitals for the past 12 hrs: 
 Temp Pulse Resp BP SpO2  
04/30/19 0418 98.2 °F (36.8 °C) 79 18 149/79 94 % 04/29/19 2309 98 °F (36.7 °C) 73 18 137/85 95 % 04/29/19 2138 99.2 °F (37.3 °C) 64 18 152/71 95 % 04/29/19 2042 99.3 °F (37.4 °C) 67 16 151/71 95 % 04/29/19 1904 98.1 °F (36.7 °C) 74 18 141/65 95 % Pain Assessment Pain 1 Pain Scale 1: Visual (04/30/19 0224) Pain Intensity 1: 0 (04/30/19 0224) Patient Stated Pain Goal: 0 (04/30/19 0224) Pain Reassessment 1: Patient sleeping (04/30/19 0224) Pain Onset 1: hours (04/27/19 2332) Pain Location 1: Head (04/27/19 2332) Pain Orientation 1: Anterior (04/27/19 2332) Pain Description 1: Aching (04/27/19 2332) Pain Intervention(s) 1: Medication (see MAR)(in ED) (04/27/19 2332) Ambulating Yes Additional Information:  
 
- planning for port placement today with IR @ 0900. 
- has been NPO since [place] 
- s/p 1u PLT last night - pt tolerated well-- PLT up to 79 this AM  
- benadryl & ativan PO given at HS 
- VSS/afebrile during shift. Pt slept well and ambulated halls multiple times. No further needs expressed. Shift report will be given to oncoming nurse at the bedside.  
 
Ashia Stallings RN

## 2019-05-01 ENCOUNTER — PATIENT OUTREACH (OUTPATIENT)
Dept: CASE MANAGEMENT | Age: 74
End: 2019-05-01

## 2019-05-01 LAB
ABO + RH BLD: NORMAL
BLD PROD TYP BPU: NORMAL
BLD PROD TYP BPU: NORMAL
BLOOD GROUP ANTIBODIES SERPL: NORMAL
BPU ID: NORMAL
BPU ID: NORMAL
CROSSMATCH RESULT,%XM: NORMAL
SPECIMEN EXP DATE BLD: NORMAL
STATUS OF UNIT,%ST: NORMAL
STATUS OF UNIT,%ST: NORMAL
UNIT DIVISION, %UDIV: 0
UNIT DIVISION, %UDIV: 0

## 2019-05-01 PROCEDURE — 88305 TISSUE EXAM BY PATHOLOGIST: CPT

## 2019-05-01 NOTE — PROGRESS NOTES
This note will not be viewable in [address] Date/Time of Call: 05/01/19 1223pm  
What was the patient hospitalized for? Thrombocytopenia Consent for MARIAH LEO Call Does the patient understand his/her diagnosis and/or treatment and what happened during the hospitalization? Patients spouse Arminda Caballero agrees to call; states that the patient is doing really good day today Yes Did the patient receive discharge instructions? Yes  
CM Assessed Risk for Readmission:  
 
 
Patient stated Risk for Readmission:  Patient is scheduled for readmission on 05/03/19. Arminda Caballero states that the admission may be moved up or pushed out until Monday due to lab being closed on the weekend Review any discharge instructions (see [facility] Ask patient if they understand these. Do they have any questions? reviewed DC instructions Were home services ordered (nursing, PT, OT, ST, etc.)? No   
If so, has the first visit occurred? If not, why? (Assist with coordination of services if necessary. ) 
 NA Was any DME ordered? No  
If so, has it been received? If not, why?  (Assist patient in obtaining DME orders &/or equipment if necessary.) NApatient has a CPAP in the home Complete a review of all medications (new, continued and discontinued meds per the D/C instructions and medication tab in [facility]). Medications reviewed START taking: 
acyclovir 400 mg tablet (ZOVIRAX) 
allopurinol 300 mg tablet (ZYLOPRIM) 
calcium carbonate 500 mg calcium (1,250 mg) 
tablet (OS-CHRIS) 
cholecalciferol 50,000 unit capsule (VITAMIN D3) 
fluconazole 200 mg tablet (DIFLUCAN) levoFLOXacin 500 mg tablet (LEVAQUIN) 
ondansetron hcl 8 mg tablet (ZOFRAN) STOP taking: 
CeleBREX 200 mg capsule Were all new prescriptions filled? If not, why?  (Assist patient in obtaining medications if necessary  escalate for CCM &/or SW if ongoing issues are verbalized by pt or anticipated) Yes Does the patient understand the purpose and dosing instructions for all medications? (If patient has questions, provide explanation and education.) Jackson verbalizes understanding of patients medications Does the patient have any problems in performing ADLs? (If patient is unable to perform ADLs  what is the limiting factor(s)? Do they have a support system that can assist? If no support system is present, discuss possible assistance that they may be able to obtain. Escalate for CCM/SW if ongoing issues are verbalized by pt or anticipated) Lance Friend states that the patient is independent with ADLs and he assists if needed Does the patient have all follow-up appointments scheduled? 7 day f/up with PCP?  
(f/up with PCP may be w/in 14 days if patient has a f/up with their specialist w/in 7 days) 7-14 day f/up with specialist?  
(or per discharge instructions) If f/up has not been made  what actions has the care coordinator made to accomplish this? Has transportation been arranged? yes Following with Oncology. Scheduled for an admission on 05/03/19. FUs will be scheduled appropriately Reviewed admission information with Lance Friend as listed above No concerns Any other questions or concerns expressed by the patient? No questions or concerns are voiced. Care Coordinator contact information provided should any needs arise. Schedule next appointment with MARIAH Ramirez or refer to RN Case Manager/ per the workflow guidelines. When is care coordinators next follow-up call scheduled? If referred for CCM  what RN care manager was the referral assigned? No TOBY FU outreach will be scheduled at this time as the patient is scheduled for admission 05/03/19 and should be reassigned pending DC disposition TOBY Call Completed By: Blanca Mathews, [address] [place], [place] [address]e Care Coordinator

## 2019-05-02 RX ORDER — LEVOFLOXACIN 500 MG/1
500 TABLET, FILM COATED ORAL EVERY 24 HOURS
Status: CANCELLED | OUTPATIENT
Start: 2019-05-02

## 2019-05-02 RX ORDER — PRAVASTATIN SODIUM 20 MG/1
10 TABLET ORAL
Status: CANCELLED | OUTPATIENT
Start: 2019-05-02

## 2019-05-02 RX ORDER — ESCITALOPRAM OXALATE 10 MG/1
10 TABLET ORAL DAILY
Status: CANCELLED | OUTPATIENT
Start: 2019-05-03

## 2019-05-02 RX ORDER — CALCIUM CARBONATE 500(1250)
500 TABLET ORAL
Status: CANCELLED | OUTPATIENT
Start: 2019-05-02

## 2019-05-02 RX ORDER — FLUCONAZOLE 100 MG/1
200 TABLET ORAL DAILY
Status: CANCELLED | OUTPATIENT
Start: 2019-05-03

## 2019-05-02 RX ORDER — LORAZEPAM 1 MG/1
1 TABLET ORAL
Status: CANCELLED | OUTPATIENT
Start: 2019-05-02

## 2019-05-02 RX ORDER — ONDANSETRON HYDROCHLORIDE 8 MG/1
8 TABLET, FILM COATED ORAL
Status: CANCELLED | OUTPATIENT
Start: 2019-05-02

## 2019-05-02 RX ORDER — ALLOPURINOL 300 MG/1
300 TABLET ORAL DAILY
Status: CANCELLED | OUTPATIENT
Start: 2019-05-03

## 2019-05-02 RX ORDER — ACYCLOVIR 800 MG/1
400 TABLET ORAL 2 TIMES DAILY
Status: CANCELLED | OUTPATIENT
Start: 2019-05-02

## 2019-05-02 NOTE — H&P
[employer] [facility] Inpatient Hematology / Oncology History and Physical 
 
Reason for Asmission:  Admission for antineoplastic chemotherapy [Z51.11] History of Present Illness: Ms. Yu is a 68 y. o. female admitted on 5/5/2019 for cycle one 7+3. 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 Patient was considered for clinical trial [id number]. but in order to qualify for trial she would need to be FLT3 negative. Rather than waiting for FLT3 results, patient was discharge home. As it turned out she is FLT3 + and therefore will not participate in trial. She was admitted today and will have BMbx tomorrow, then she will start 7+3 with Midostarurin Day 8-21. However, on exam today, her right side port is red and swollen therefore, we will culture then start vanc/cefe. Port should not be used. If BC positive may need to Echo and port removal. If PICC line is indicated, it will need to be done with IR due to multiple failed attempts at bedside. Review of Systems: 
Constitutional Off and on  Fevers at home. No chills, weight loss, appetite changes, fatigue, night sweats. HEENT Denies trauma, blurry vision, hearing loss, ear pain, nosebleeds, sore throat, neck pain Skin Denies lesions or rashes. Lungs Denies dyspnea, cough, sputum production or hemoptysis. Cardiovascular Denies chest pain, palpitations, or lower extremity edema. Gastrointestinal Denies nausea, vomiting, changes in bowel habits, bloody or black stools, abdominal pain.  Denies dysuria, frequency or hesitancy of urination. Neuro Denies headaches, visual changes or ataxia. Denies dizziness, tingling, tremors, sensory change, speech change, focal weakness or headaches. MSK Denies back pain, arthralgias, myalgias or frequent falls. Psychiatric/Behavioral The patient is not nervous/anxious. No Known Allergies Past Medical History:  
Diagnosis Date  Cancer ([facility] [address] AML  
 
Past Surgical History:  
Procedure Laterality Date  IR INSERT TUNL CVC W PORT OVER 5 YEARS  4/30/2019 No family history on file. Social History Socioeconomic History  Marital status:  Spouse name: Not on file  Number of children: Not on file  Years of education: Not on file  Highest education level: Not on file Occupational History  Not on file Social Needs  Financial resource strain: Not on file  Food insecurity:  
  Worry: Not on file Inability: Not on file  Transportation needs:  
  Medical: Not on file Non-medical: Not on file Tobacco Use  Smoking status: Never Smoker  Smokeless tobacco: Never Used Substance and Sexual Activity  Alcohol use: Never Frequency: Never  Drug use: Never  Sexual activity: Not on file Lifestyle  Physical activity:  
  Days per week: Not on file Minutes per session: Not on file  Stress: Not on file Relationships  Social connections:  
  Talks on phone: Not on file Gets together: Not on file Attends Religion service: Not on file Active member of club or organization: Not on file Attends meetings of clubs or organizations: Not on file Relationship status: Not on file  Intimate partner violence:  
  Fear of current or ex partner: Not on file Emotionally abused: Not on file Physically abused: Not on file Forced sexual activity: Not on file Other Topics Concern  Not on file Social History Narrative  Not on file Current Facility-Administered Medications Medication Dose Route Frequency Provider Last Rate Last Dose  acetaminophen/diphenhydrAMINE (TYLENOL PM EXT STR) 500/25 mg   Oral QHS Marcus Varner NP      
 acyclovir (ZOVIRAX) tablet 400 mg  400 mg Oral BID Marcus Varner NP      
 [START ON 5/6/2019] allopurinol (ZYLOPRIM) tablet 300 mg  300 mg Oral DAILY Marcus Varner NP      
  calcium carbonate (OS-CHRIS) tablet 500 mg [elemental]  500 mg Oral TID WITH MEALS Juaquin Kaplan NP      
 . PHARMACY TO SUBSTITUTE PER PROTOCOL (Reordered from: cholecalciferol (VITAMIN D3) 50,000 unit capsule)    Per Protocol Juaquin Kaplan NP      
 [START ON 2019] escitalopram oxalate (LEXAPRO) tablet 10 mg  10 mg Oral DAILY Juaquin Kaplan NP      
 [START ON 2019] fluconazole (DIFLUCAN) tablet 200 mg  200 mg Oral DAILY Juaquin Kaplan NP      
 lidocaine-prilocaine (EMLA) 2.5-2.5 % cream   Topical PRN Juaquin Kaplan NP      
 LORazepam (ATIVAN) tablet 1 mg  1 mg Oral QHS Juaquin Kaplan NP      
 ondansetron hcl (ZOFRAN) tablet 8 mg  8 mg Oral Q8H PRN Juaquin Kaplan NP      
 pravastatin (PRAVACHOL) tablet 10 mg  10 mg Oral QHS Juaquin Kaplan NP      
 . PHARMACY TO SUBSTITUTE PER PROTOCOL (Reordered from: vit C/E/zinc/lutein/zeaxanthin ([address] PO))    Per Protocol Juaquin Kaplan NP      
 cefepime (MAXIPIME) 2 g in 0.9% sodium chloride (MBP/ADV) 100 mL MBP  2 g IntraVENous Q12H Juaquin Kaplan NP      
 enoxaparin (LOVENOX) injection 40 mg  40 mg SubCUTAneous Q24H Juaquin Kaplan NP      
 
 
OBJECTIVE: 
Patient Vitals for the past 8 hrs: 
 BP Temp Pulse Resp SpO2 Height Weight 19 1047 134/49 98.5 °F (36.9 °C) 77 18 99 % 5' 6\" (1.676 m) 193 lb 12.8 oz (87.9 kg) Temp (24hrs), Av.5 °F (36.9 °C), Min:98.5 °F (36.9 °C), Max:98.5 °F (36.9 °C) No intake/output data recorded. Physical Exam: 
Constitutional: Well developed, well nourished female in no acute distress, sitting comfortably in the hospital bed. HEENT: Normocephalic and atraumatic. Oropharynx is clear, mucous membranes are moist.  Extraocular muscles are intact. Sclerae anicteric. Skin Warm and dry. No bruising and no rash noted. No erythema. No pallor. Right side port inflamed and red Respiratory Lungs are clear to auscultation bilaterally without wheezes, rales or rhonchi, normal air exchange without accessory muscle use. CVS Normal rate, regular rhythm and normal S1 and S2. No murmurs, gallops, or rubs. Abdomen Soft, nontender and nondistended, normoactive bowel sounds. No palpable mass. No hepatosplenomegaly. Neuro Grossly nonfocal with no obvious sensory or motor deficits. MSK Normal range of motion in general.  No edema and no tenderness. Psych Appropriate mood and affect. Labs:   
Recent Results (from the past 24 hour(s)) CBC WITH AUTOMATED DIFF Collection Time: 05/04/19  1:43 PM  
Result Value Ref Range WBC 9.9 4.3 - 11.1 K/uL  
 RBC 2.40 (L) 4.05 - 5.2 M/uL HGB 7.8 (L) 11.7 - 15.4 g/dL HCT 23.6 (L) 35.8 - 46.3 % MCV 98.3 (H) 79.6 - 97.8 FL  
 MCH 32.5 26.1 - 32.9 PG  
 MCHC 33.1 31.4 - 35.0 g/dL  
 RDW 15.9 (H) 11.9 - 14.6 % PLATELET 25 (LL) 813 - 450 K/uL MPV 11.2 9.4 - 12.3 FL ABSOLUTE NRBC 0.02 0.0 - 0.2 K/uL LYMPHOCYTES 45 (H) 16 - 44 % MONOCYTES 14 (H) 3 - 9 % BLASTS 41 % ABS. LYMPHOCYTES 4.5 0.5 - 4.6 K/UL  
 ABS. MONOCYTES 1.4 (H) 0.1 - 1.3 K/UL  
 RBC COMMENTS SLIGHT 
ANISOCYTOSIS + POIKILOCYTOSIS 
    
 WBC COMMENTS WBC'S APPEAR ABNORMAL/IMMATURE/ATYPICAL PLATELET COMMENTS MARKED    
 DF MANUAL METABOLIC PANEL, COMPREHENSIVE Collection Time: 05/04/19  1:43 PM  
Result Value Ref Range Sodium 141 136 - 145 mmol/L Potassium 3.6 3.5 - 5.1 mmol/L Chloride 109 (H) 98 - 107 mmol/L  
 CO2 27 21 - 32 mmol/L Anion gap 5 (L) 7 - 16 mmol/L Glucose 89 65 - 100 mg/dL BUN 13 8 - 23 MG/DL Creatinine 0.75 0.6 - 1.0 MG/DL  
 GFR est AA >60 >60 ml/min/1.73m2 GFR est non-AA >60 >60 ml/min/1.73m2 Calcium 8.8 8.3 - 10.4 MG/DL Bilirubin, total 0.3 0.2 - 1.1 MG/DL  
 ALT (SGPT) 17 12 - 65 U/L  
 AST (SGOT) 16 15 - 37 U/L Alk. phosphatase 81 50 - 136 U/L Protein, total 7.1 6.3 - 8.2 g/dL Albumin 3.5 3.2 - 4.6 g/dL Globulin 3.6 (H) 2.3 - 3.5 g/dL A-G Ratio 1.0 (L) 1.2 - 3.5 MAGNESIUM Collection Time: 05/04/19  1:43 PM  
Result Value Ref Range Magnesium 2.3 1.8 - 2.4 mg/dL ASSESSMENT: 
Problem List  Date Reviewed: 4/30/2019 Codes Class Noted Admission for antineoplastic chemotherapy ICD-10-CM: Z51.11 ICD-9-CM: V58.11  5/5/2019 AML (acute myeloblastic leukemia) ([facility] [address] ICD-10-CM: C92.00 ICD-9-CM: 205.00  4/28/2019 Weakness generalized ICD-10-CM: R53.1 ICD-9-CM: 780.79  4/28/2019 Pancytopenia ([facility] [address] ICD-10-CM: T42.578 ICD-9-CM: 284.19  4/28/2019 [facility] ICD-10-CM: D69.6 ICD-9-CM: 287.5  4/27/2019 PLAN: 
AML 
5/5 BMbx tomorrow then wait for BC from port results to determine start date of  cycle one 7 + 3 with Midostaurin day 8-21. Possible port infection 5/5 Day one vanc/cefe. Follow cultures. Do not use port. May need to have Echo if cultures positive. [facility] Supportive care Lab studies and imaging studies were personally reviewed. Pertinent old records were reviewed. Yamileth Mock NP [facility] Hematology & Oncology [id number] [place] [address] Office : [phone] Fax : [phone] Attending Addendum: 
I have personally performed a face to face diagnostic evaluation on this patient. I have reviewed and agree with the care plan as documented above by Yamileth Mock N.P.  My findings are as follows: Patient appears lethargic, heart rate regular without murmurs, abdomen is non-tender, bowel sounds are positive. 68 female, retired [employer]  h/o depression (on lexapro), dyslipidemia, OA recently seen for pancytopenia w/ Dr Mehul Espinal. BM biopsy consistent w AML. FLT3 +ve (for both ITD & TKD). 2D ECHO w normal ED. Now admitted w/ plans for rebiopsy followed by induction therapy (7+3 plus midostaurin). Today reports having a low grade fever a few days ago that quickly resolved. Concern for port being infected given obvious erythema around site. Will culture, start iv vac/cef. Defer chemo for now. Port will likely need to come out. Continue allopurinol plus prophylactic abx. Shannon Carbajal MD 
[facility] Hematology/Oncology [address] [place] [address] Office : [phone] Fax : [phone]

## 2019-05-04 ENCOUNTER — HOSPITAL ENCOUNTER (OUTPATIENT)
Dept: INFUSION THERAPY | Age: 74
Discharge: HOME | End: 2019-05-04
Payer: MEDICARE

## 2019-05-04 VITALS
OXYGEN SATURATION: 96 % | DIASTOLIC BLOOD PRESSURE: 70 MMHG | TEMPERATURE: 98.4 F | RESPIRATION RATE: 18 BRPM | SYSTOLIC BLOOD PRESSURE: 157 MMHG | HEART RATE: 73 BPM

## 2019-05-04 LAB
ALBUMIN SERPL-MCNC: 3.5 G/DL (ref 3.2–4.6)
ALBUMIN/GLOB SERPL: 1 {RATIO} (ref 1.2–3.5)
ALP SERPL-CCNC: 81 U/L (ref 50–136)
ALT SERPL-CCNC: 17 U/L (ref 12–65)
ANION GAP SERPL CALC-SCNC: 5 MMOL/L (ref 7–16)
AST SERPL-CCNC: 16 U/L (ref 15–37)
BILIRUB SERPL-MCNC: 0.3 MG/DL (ref 0.2–1.1)
BLASTS NFR BLD MANUAL: 41 %
BUN SERPL-MCNC: 13 MG/DL (ref 8–23)
CALCIUM SERPL-MCNC: 8.8 MG/DL (ref 8.3–10.4)
CHLORIDE SERPL-SCNC: 109 MMOL/L (ref 98–107)
CO2 SERPL-SCNC: 27 MMOL/L (ref 21–32)
CREAT SERPL-MCNC: 0.75 MG/DL (ref 0.6–1)
DIFFERENTIAL METHOD BLD: ABNORMAL
ERYTHROCYTE [DISTWIDTH] IN BLOOD BY AUTOMATED COUNT: 15.9 % (ref 11.9–14.6)
GLOBULIN SER CALC-MCNC: 3.6 G/DL (ref 2.3–3.5)
GLUCOSE SERPL-MCNC: 89 MG/DL (ref 65–100)
HCT VFR BLD AUTO: 23.6 % (ref 35.8–46.3)
HGB BLD-MCNC: 7.8 G/DL (ref 11.7–15.4)
LYMPHOCYTES # BLD: 4.5 K/UL (ref 0.5–4.6)
LYMPHOCYTES NFR BLD MANUAL: 45 % (ref 16–44)
MAGNESIUM SERPL-MCNC: 2.3 MG/DL (ref 1.8–2.4)
MCH RBC QN AUTO: 32.5 PG (ref 26.1–32.9)
MCHC RBC AUTO-ENTMCNC: 33.1 G/DL (ref 31.4–35)
MCV RBC AUTO: 98.3 FL (ref 79.6–97.8)
MONOCYTES # BLD: 1.4 K/UL (ref 0.1–1.3)
MONOCYTES NFR BLD MANUAL: 14 % (ref 3–9)
NRBC # BLD: 0.02 K/UL (ref 0–0.2)
PLATELET # BLD AUTO: 25 K/UL (ref 150–450)
PLATELET COMMENTS,PCOM: ABNORMAL
PMV BLD AUTO: 11.2 FL (ref 9.4–12.3)
POTASSIUM SERPL-SCNC: 3.6 MMOL/L (ref 3.5–5.1)
PROT SERPL-MCNC: 7.1 G/DL (ref 6.3–8.2)
RBC # BLD AUTO: 2.4 M/UL (ref 4.05–5.2)
RBC MORPH BLD: ABNORMAL
SODIUM SERPL-SCNC: 141 MMOL/L (ref 136–145)
WBC # BLD AUTO: 9.9 K/UL (ref 4.3–11.1)
WBC MORPH BLD: ABNORMAL

## 2019-05-04 PROCEDURE — 83735 ASSAY OF MAGNESIUM: CPT

## 2019-05-04 PROCEDURE — 74011250636 HC RX REV CODE- 250/636: Performed by: NURSE PRACTITIONER

## 2019-05-04 PROCEDURE — 80053 COMPREHEN METABOLIC PANEL: CPT

## 2019-05-04 PROCEDURE — 85025 COMPLETE CBC W/AUTO DIFF WBC: CPT

## 2019-05-04 PROCEDURE — 36591 DRAW BLOOD OFF VENOUS DEVICE: CPT

## 2019-05-04 RX ORDER — SODIUM CHLORIDE 0.9 % (FLUSH) 0.9 %
10 SYRINGE (ML) INJECTION AS NEEDED
Status: ACTIVE | OUTPATIENT
Start: 2019-05-04 | End: 2019-05-04

## 2019-05-04 RX ORDER — HEPARIN 100 UNIT/ML
500 SYRINGE INTRAVENOUS AS NEEDED
Status: DISPENSED | OUTPATIENT
Start: 2019-05-04 | End: 2019-05-04

## 2019-05-04 RX ADMIN — Medication 10 ML: at 13:40

## 2019-05-04 RX ADMIN — Medication 500 UNITS: at 14:23

## 2019-05-04 NOTE — PROGRESS NOTES
Pt arrived ambulatory to [facility]. Port accessed with good blood return. Blood drawn and sent to lab. Platelets 49V read back and confirmed with Adam Moon in the lab. All labs resulted no replacements needed at this time. Port packed with heparin and remains accessed for tomorrows in patient appointment. Pt discharged ambulatory.

## 2019-05-05 ENCOUNTER — HOSPITAL ENCOUNTER (INPATIENT)
Age: 74
LOS: 33 days | Discharge: HOME | DRG: 837 | End: 2019-06-07
Attending: INTERNAL MEDICINE | Admitting: INTERNAL MEDICINE
Payer: MEDICARE

## 2019-05-05 DIAGNOSIS — D64.9 SEVERE ANEMIA: ICD-10-CM

## 2019-05-05 DIAGNOSIS — C92.00 ACUTE MYELOID LEUKEMIA NOT HAVING ACHIEVED REMISSION (HCC): Primary | ICD-10-CM

## 2019-05-05 DIAGNOSIS — T80.212A PORT OR RESERVOIR INFECTION, INITIAL ENCOUNTER: ICD-10-CM

## 2019-05-05 DIAGNOSIS — D69.6 THROMBOCYTOPENIA (HCC): ICD-10-CM

## 2019-05-05 DIAGNOSIS — R11.0 NAUSEA: ICD-10-CM

## 2019-05-05 DIAGNOSIS — R50.81 FEBRILE NEUTROPENIA (HCC): ICD-10-CM

## 2019-05-05 DIAGNOSIS — R50.9 FEVER, UNSPECIFIED FEVER CAUSE: ICD-10-CM

## 2019-05-05 DIAGNOSIS — D61.818 PANCYTOPENIA (HCC): ICD-10-CM

## 2019-05-05 DIAGNOSIS — Z51.11 ADMISSION FOR ANTINEOPLASTIC CHEMOTHERAPY: ICD-10-CM

## 2019-05-05 DIAGNOSIS — R53.1 WEAKNESS GENERALIZED: ICD-10-CM

## 2019-05-05 DIAGNOSIS — D70.9 FEBRILE NEUTROPENIA (HCC): ICD-10-CM

## 2019-05-05 DIAGNOSIS — R53.83 FATIGUE, UNSPECIFIED TYPE: ICD-10-CM

## 2019-05-05 DIAGNOSIS — Z91.89 AT HIGH RISK OF TUMOR LYSIS SYNDROME: ICD-10-CM

## 2019-05-05 LAB
ALBUMIN SERPL-MCNC: 3.5 G/DL (ref 3.2–4.6)
ALBUMIN/GLOB SERPL: 1 {RATIO} (ref 1.2–3.5)
ALP SERPL-CCNC: 79 U/L (ref 50–136)
ALT SERPL-CCNC: 18 U/L (ref 12–65)
ANION GAP SERPL CALC-SCNC: 8 MMOL/L (ref 7–16)
AST SERPL-CCNC: 19 U/L (ref 15–37)
BILIRUB SERPL-MCNC: 0.6 MG/DL (ref 0.2–1.1)
BLASTS NFR BLD MANUAL: 64 %
BUN SERPL-MCNC: 12 MG/DL (ref 8–23)
CALCIUM SERPL-MCNC: 8.7 MG/DL (ref 8.3–10.4)
CHLORIDE SERPL-SCNC: 108 MMOL/L (ref 98–107)
CO2 SERPL-SCNC: 24 MMOL/L (ref 21–32)
CREAT SERPL-MCNC: 0.8 MG/DL (ref 0.6–1)
DIFFERENTIAL METHOD BLD: ABNORMAL
EOSINOPHIL # BLD: 0.2 K/UL (ref 0–0.8)
EOSINOPHIL NFR BLD MANUAL: 2 % (ref 1–8)
ERYTHROCYTE [DISTWIDTH] IN BLOOD BY AUTOMATED COUNT: 15.8 % (ref 11.9–14.6)
GLOBULIN SER CALC-MCNC: 3.5 G/DL (ref 2.3–3.5)
GLUCOSE SERPL-MCNC: 99 MG/DL (ref 65–100)
HCT VFR BLD AUTO: 22.2 % (ref 35.8–46.3)
HGB BLD-MCNC: 7.4 G/DL (ref 11.7–15.4)
LYMPHOCYTES # BLD: 2.7 K/UL (ref 0.5–4.6)
LYMPHOCYTES NFR BLD MANUAL: 29 % (ref 16–44)
MAGNESIUM SERPL-MCNC: 2.2 MG/DL (ref 1.8–2.4)
MCH RBC QN AUTO: 32.7 PG (ref 26.1–32.9)
MCHC RBC AUTO-ENTMCNC: 33.3 G/DL (ref 31.4–35)
MCV RBC AUTO: 98.2 FL (ref 79.6–97.8)
NEUTS SEG # BLD: 0.5 K/UL (ref 1.7–8.2)
NEUTS SEG NFR BLD MANUAL: 4 % (ref 47–75)
NRBC # BLD: 0.02 K/UL (ref 0–0.2)
PLATELET # BLD AUTO: 17 K/UL (ref 150–450)
PLATELET COMMENTS,PCOM: ABNORMAL
PMV BLD AUTO: 11.1 FL (ref 9.4–12.3)
POTASSIUM SERPL-SCNC: 3.6 MMOL/L (ref 3.5–5.1)
PROMYELOCYTES NFR BLD MANUAL: 1 %
PROT SERPL-MCNC: 7 G/DL (ref 6.3–8.2)
RBC # BLD AUTO: 2.26 M/UL (ref 4.05–5.2)
RBC MORPH BLD: ABNORMAL
SODIUM SERPL-SCNC: 140 MMOL/L (ref 136–145)
WBC # BLD AUTO: 9.3 K/UL (ref 4.3–11.1)

## 2019-05-05 PROCEDURE — 74011250637 HC RX REV CODE- 250/637: Performed by: NURSE PRACTITIONER

## 2019-05-05 PROCEDURE — 65270000015 HC RM PRIVATE ONCOLOGY

## 2019-05-05 PROCEDURE — 65270000029 HC RM PRIVATE

## 2019-05-05 PROCEDURE — 74011250636 HC RX REV CODE- 250/636: Performed by: NURSE PRACTITIONER

## 2019-05-05 PROCEDURE — 85025 COMPLETE CBC W/AUTO DIFF WBC: CPT

## 2019-05-05 PROCEDURE — 87040 BLOOD CULTURE FOR BACTERIA: CPT

## 2019-05-05 PROCEDURE — 36591 DRAW BLOOD OFF VENOUS DEVICE: CPT

## 2019-05-05 PROCEDURE — 77030020256 HC SOL INJ NACL 0.9%  500ML

## 2019-05-05 PROCEDURE — 36415 COLL VENOUS BLD VENIPUNCTURE: CPT

## 2019-05-05 PROCEDURE — 80053 COMPREHEN METABOLIC PANEL: CPT

## 2019-05-05 PROCEDURE — 83735 ASSAY OF MAGNESIUM: CPT

## 2019-05-05 PROCEDURE — 74011000258 HC RX REV CODE- 258: Performed by: NURSE PRACTITIONER

## 2019-05-05 PROCEDURE — 99223 1ST HOSP IP/OBS HIGH 75: CPT | Performed by: INTERNAL MEDICINE

## 2019-05-05 RX ORDER — VANCOMYCIN 1.75 GRAM/500 ML IN 0.9 % SODIUM CHLORIDE INTRAVENOUS
1750 ONCE
Status: COMPLETED | OUTPATIENT
Start: 2019-05-05 | End: 2019-05-06

## 2019-05-05 RX ORDER — ENOXAPARIN SODIUM 100 MG/ML
40 INJECTION SUBCUTANEOUS EVERY 24 HOURS
Status: DISCONTINUED | OUTPATIENT
Start: 2019-05-05 | End: 2019-05-05

## 2019-05-05 RX ORDER — PRAVASTATIN SODIUM 20 MG/1
10 TABLET ORAL
Status: DISCONTINUED | OUTPATIENT
Start: 2019-05-05 | End: 2019-06-07 | Stop reason: HOSPADM

## 2019-05-05 RX ORDER — FLUCONAZOLE 100 MG/1
200 TABLET ORAL DAILY
Status: DISCONTINUED | OUTPATIENT
Start: 2019-05-06 | End: 2019-06-07 | Stop reason: HOSPADM

## 2019-05-05 RX ORDER — ALLOPURINOL 300 MG/1
300 TABLET ORAL DAILY
Status: DISCONTINUED | OUTPATIENT
Start: 2019-05-06 | End: 2019-06-07 | Stop reason: HOSPADM

## 2019-05-05 RX ORDER — ESCITALOPRAM OXALATE 10 MG/1
10 TABLET ORAL DAILY
Status: DISCONTINUED | OUTPATIENT
Start: 2019-05-06 | End: 2019-06-07 | Stop reason: HOSPADM

## 2019-05-05 RX ORDER — LORAZEPAM 1 MG/1
1 TABLET ORAL
Status: DISCONTINUED | OUTPATIENT
Start: 2019-05-05 | End: 2019-06-07 | Stop reason: HOSPADM

## 2019-05-05 RX ORDER — CALCIUM CARBONATE 500(1250)
500 TABLET ORAL
Status: DISCONTINUED | OUTPATIENT
Start: 2019-05-05 | End: 2019-06-07 | Stop reason: HOSPADM

## 2019-05-05 RX ORDER — LIDOCAINE AND PRILOCAINE 25; 25 MG/G; MG/G
CREAM TOPICAL AS NEEDED
Status: DISCONTINUED | OUTPATIENT
Start: 2019-05-05 | End: 2019-06-07 | Stop reason: HOSPADM

## 2019-05-05 RX ORDER — ERGOCALCIFEROL 1.25 MG/1
50000 CAPSULE ORAL
Status: DISCONTINUED | OUTPATIENT
Start: 2019-05-05 | End: 2019-05-05

## 2019-05-05 RX ORDER — ONDANSETRON 8 MG/1
8 TABLET, ORALLY DISINTEGRATING ORAL
Status: DISCONTINUED | OUTPATIENT
Start: 2019-05-05 | End: 2019-06-07 | Stop reason: HOSPADM

## 2019-05-05 RX ORDER — ERGOCALCIFEROL 1.25 MG/1
50000 CAPSULE ORAL
Status: DISCONTINUED | OUTPATIENT
Start: 2019-05-08 | End: 2019-05-31

## 2019-05-05 RX ORDER — HEPARIN 100 UNIT/ML
500 SYRINGE INTRAVENOUS ONCE
Status: DISPENSED | OUTPATIENT
Start: 2019-05-05 | End: 2019-05-06

## 2019-05-05 RX ORDER — ACYCLOVIR 800 MG/1
400 TABLET ORAL 2 TIMES DAILY
Status: DISCONTINUED | OUTPATIENT
Start: 2019-05-05 | End: 2019-06-07 | Stop reason: HOSPADM

## 2019-05-05 RX ADMIN — PRAVASTATIN SODIUM 10 MG: 20 TABLET ORAL at 22:27

## 2019-05-05 RX ADMIN — Medication 500 MG: at 17:20

## 2019-05-05 RX ADMIN — ACYCLOVIR 400 MG: 800 TABLET ORAL at 17:20

## 2019-05-05 RX ADMIN — CEFEPIME HYDROCHLORIDE 2 G: 2 INJECTION, POWDER, FOR SOLUTION INTRAVENOUS at 13:40

## 2019-05-05 RX ADMIN — Medication 500 MG: at 13:41

## 2019-05-05 RX ADMIN — LORAZEPAM 1 MG: 1 TABLET ORAL at 22:26

## 2019-05-05 RX ADMIN — VANCOMYCIN HYDROCHLORIDE 1750 MG: 10 INJECTION, POWDER, LYOPHILIZED, FOR SOLUTION INTRAVENOUS at 14:43

## 2019-05-05 RX ADMIN — ACETAMINOPHEN: 500 TABLET, FILM COATED ORAL at 22:26

## 2019-05-05 NOTE — PROGRESS NOTES
END OF SHIFT NOTE: 
 
Intake/Output 05/05 0701 - 05/05 1900 In: 240 [P.O.:240] Out: -   
Voiding: YES Catheter: NO 
Drain:   
 
 
 
 
 
Stool:  0 occurrences. Emesis:  0 occurrences. VITAL SIGNS Patient Vitals for the past 12 hrs: 
 Temp Pulse Resp BP SpO2  
05/05/19 1522 99 °F (37.2 °C) 78 16 131/49 96 % 05/05/19 1047 98.5 °F (36.9 °C) 77 18 134/49 99 % Pain Assessment Pain 1 Pain Scale 1: Numeric (0 - 10) (05/05/19 1502) Pain Intensity 1: 0 (05/05/19 1502) Patient Stated Pain Goal: 0 (05/05/19 1148) Ambulating Yes- to bathroom Additional Information: Pt arrived to 5th floor ambulatory this morning at 1030. Plans to hold the 7+3/midostaurin chemotherapy due to possible port infection. BC's obtained, antibiotics initiated. Do NOT use port at this time. Port de-accessed per policy and physician preference. Skin is bruised (yellow/purple) with irritated pink skin around the periphery of the needle. PIV placed. Temp max=99.5, pt denies any chills/pain/malaise. Pt will be NPO after midnight for BMBx tomorrow. Shift report given to oncoming nurse at the bedside.  
 
Tila Lim RN

## 2019-05-05 NOTE — PROGRESS NOTES
Pt arrived to 5th floor via ambulatory accompanied by spouse. Gillian Salazar NP notified of pt's arrival. Awaiting orders to be entered in connect care.

## 2019-05-05 NOTE — PROGRESS NOTES
Pharmacokinetic Consult to Pharmacist 
 
Ed Collazo is a 68 y.o. female being treated for SSI. Height: 5' 6\" (167.6 cm)  Weight: 87.9 kg (193 lb 12.8 oz) Lab Results Component Value Date/Time BUN 13 05/04/2019 01:43 PM  
 Creatinine 0.75 05/04/2019 01:43 PM  
 WBC 9.9 05/04/2019 01:43 PM  
  
Estimated Creatinine Clearance: 74.6 mL/min (based on SCr of 0.75 mg/dL). CULTURES: 
All Micro Results Procedure Component Value Units Date/Time CULTURE, BLOOD [[id number]] Order Status:  Sent Specimen:  Blood CULTURE, BLOOD [[id number]] Order Status:  Sent Specimen:  Blood Day 1 of vancomycin. Goal trough is 15-20. I will order a loading dose of 1750mg X1 now and 1000mg q12h to begin tomorrow. Pharmacist will continue to follow patient. Please call with any questions. Thank you, Era Chapman, PharmD, [facility] Clinical Pharmacist 
[phone]

## 2019-05-05 NOTE — PROGRESS NOTES
Problem: Falls - Risk of 
Goal: *Absence of Falls Outcome: Progressing Towards Goal 
  
Problem: Patient Education: Go to Patient Education Activity Goal: Patient/Family Education Outcome: Progressing Towards Goal 
  
Problem: Anxiety Goal: *Alleviation of anxiety Outcome: Progressing Towards Goal 
Goal: *Alleviation of anxiety (Palliative Care) Outcome: Progressing Towards Goal 
  
Problem: Patient Education: Go to Patient Education Activity Goal: Patient/Family Education Outcome: Progressing Towards Goal 
  
Problem: Discharge Planning Goal: *Discharge to safe environment Outcome: Progressing Towards Goal 
Goal: *Knowledge of medication management Outcome: Progressing Towards Goal 
Goal: *Knowledge of discharge instructions Outcome: Progressing Towards Goal 
  
Problem: Patient Education: Go to Patient Education Activity Goal: Patient/Family Education Outcome: Progressing Towards Goal

## 2019-05-06 LAB
ALBUMIN SERPL-MCNC: 3.3 G/DL (ref 3.2–4.6)
ALBUMIN/GLOB SERPL: 1 {RATIO} (ref 1.2–3.5)
ALP SERPL-CCNC: 75 U/L (ref 50–136)
ALT SERPL-CCNC: 18 U/L (ref 12–65)
ANION GAP SERPL CALC-SCNC: 7 MMOL/L (ref 7–16)
AST SERPL-CCNC: 14 U/L (ref 15–37)
BILIRUB SERPL-MCNC: 0.4 MG/DL (ref 0.2–1.1)
BLASTS NFR BLD MANUAL: 40 %
BUN SERPL-MCNC: 11 MG/DL (ref 8–23)
CALCIUM SERPL-MCNC: 8.7 MG/DL (ref 8.3–10.4)
CHLORIDE SERPL-SCNC: 111 MMOL/L (ref 98–107)
CO2 SERPL-SCNC: 24 MMOL/L (ref 21–32)
CREAT SERPL-MCNC: 0.74 MG/DL (ref 0.6–1)
DIFFERENTIAL METHOD BLD: ABNORMAL
ERYTHROCYTE [DISTWIDTH] IN BLOOD BY AUTOMATED COUNT: 15.9 % (ref 11.9–14.6)
GLOBULIN SER CALC-MCNC: 3.4 G/DL (ref 2.3–3.5)
GLUCOSE SERPL-MCNC: 104 MG/DL (ref 65–100)
HCT VFR BLD AUTO: 22 % (ref 35.8–46.3)
HGB BLD-MCNC: 7.3 G/DL (ref 11.7–15.4)
LYMPHOCYTES # BLD: 4 K/UL (ref 0.5–4.6)
LYMPHOCYTES NFR BLD MANUAL: 53 % (ref 16–44)
MAGNESIUM SERPL-MCNC: 2.2 MG/DL (ref 1.8–2.4)
MCH RBC QN AUTO: 32.9 PG (ref 26.1–32.9)
MCHC RBC AUTO-ENTMCNC: 33.2 G/DL (ref 31.4–35)
MCV RBC AUTO: 99.1 FL (ref 79.6–97.8)
NEUTS SEG # BLD: 0.5 K/UL (ref 1.7–8.2)
NEUTS SEG NFR BLD MANUAL: 7 % (ref 47–75)
NRBC # BLD: 0 K/UL (ref 0–0.2)
PLATELET # BLD AUTO: 11 K/UL (ref 150–450)
PLATELET COMMENTS,PCOM: ABNORMAL
PMV BLD AUTO: 12.4 FL (ref 9.4–12.3)
POTASSIUM SERPL-SCNC: 3.5 MMOL/L (ref 3.5–5.1)
PROT SERPL-MCNC: 6.7 G/DL (ref 6.3–8.2)
RBC # BLD AUTO: 2.22 M/UL (ref 4.05–5.2)
RBC MORPH BLD: ABNORMAL
SODIUM SERPL-SCNC: 142 MMOL/L (ref 136–145)
URATE SERPL-MCNC: 4.2 MG/DL (ref 2.6–6)
WBC # BLD AUTO: 7.5 K/UL (ref 4.3–11.1)
WBC MORPH BLD: ABNORMAL

## 2019-05-06 PROCEDURE — 74011000258 HC RX REV CODE- 258: Performed by: NURSE PRACTITIONER

## 2019-05-06 PROCEDURE — 36415 COLL VENOUS BLD VENIPUNCTURE: CPT

## 2019-05-06 PROCEDURE — 80053 COMPREHEN METABOLIC PANEL: CPT

## 2019-05-06 PROCEDURE — 99233 SBSQ HOSP IP/OBS HIGH 50: CPT | Performed by: INTERNAL MEDICINE

## 2019-05-06 PROCEDURE — 86923 COMPATIBILITY TEST ELECTRIC: CPT

## 2019-05-06 PROCEDURE — 65270000029 HC RM PRIVATE

## 2019-05-06 PROCEDURE — 86900 BLOOD TYPING SEROLOGIC ABO: CPT

## 2019-05-06 PROCEDURE — 86644 CMV ANTIBODY: CPT

## 2019-05-06 PROCEDURE — 83735 ASSAY OF MAGNESIUM: CPT

## 2019-05-06 PROCEDURE — 84550 ASSAY OF BLOOD/URIC ACID: CPT

## 2019-05-06 PROCEDURE — 74011250637 HC RX REV CODE- 250/637: Performed by: NURSE PRACTITIONER

## 2019-05-06 PROCEDURE — 74011250636 HC RX REV CODE- 250/636: Performed by: NURSE PRACTITIONER

## 2019-05-06 PROCEDURE — 65270000015 HC RM PRIVATE ONCOLOGY

## 2019-05-06 PROCEDURE — 85025 COMPLETE CBC W/AUTO DIFF WBC: CPT

## 2019-05-06 RX ORDER — SODIUM CHLORIDE 9 MG/ML
250 INJECTION, SOLUTION INTRAVENOUS AS NEEDED
Status: DISCONTINUED | OUTPATIENT
Start: 2019-05-06 | End: 2019-05-12 | Stop reason: SDUPTHER

## 2019-05-06 RX ADMIN — CEFEPIME HYDROCHLORIDE 2 G: 2 INJECTION, POWDER, FOR SOLUTION INTRAVENOUS at 13:00

## 2019-05-06 RX ADMIN — LORAZEPAM 1 MG: 1 TABLET ORAL at 22:19

## 2019-05-06 RX ADMIN — Medication 500 MG: at 08:32

## 2019-05-06 RX ADMIN — Medication 500 MG: at 13:01

## 2019-05-06 RX ADMIN — Medication 500 MG: at 18:07

## 2019-05-06 RX ADMIN — ALLOPURINOL 300 MG: 300 TABLET ORAL at 08:32

## 2019-05-06 RX ADMIN — ACETAMINOPHEN: 500 TABLET, FILM COATED ORAL at 22:16

## 2019-05-06 RX ADMIN — PRAVASTATIN SODIUM 10 MG: 20 TABLET ORAL at 22:16

## 2019-05-06 RX ADMIN — VANCOMYCIN HYDROCHLORIDE 1000 MG: 1 INJECTION, POWDER, LYOPHILIZED, FOR SOLUTION INTRAVENOUS at 00:32

## 2019-05-06 RX ADMIN — ACYCLOVIR 400 MG: 800 TABLET ORAL at 18:07

## 2019-05-06 RX ADMIN — FLUCONAZOLE 200 MG: 100 TABLET ORAL at 08:33

## 2019-05-06 RX ADMIN — ACYCLOVIR 400 MG: 800 TABLET ORAL at 08:32

## 2019-05-06 RX ADMIN — VANCOMYCIN HYDROCHLORIDE 1000 MG: 1 INJECTION, POWDER, LYOPHILIZED, FOR SOLUTION INTRAVENOUS at 13:01

## 2019-05-06 RX ADMIN — CEFEPIME HYDROCHLORIDE 2 G: 2 INJECTION, POWDER, FOR SOLUTION INTRAVENOUS at 01:41

## 2019-05-06 RX ADMIN — ESCITALOPRAM OXALATE 10 MG: 10 TABLET ORAL at 08:32

## 2019-05-06 NOTE — PROGRESS NOTES
Initial visit by  to convey care and concern and encourage patient that  services are available if desired. No needs were voiced during the visit. Provided 's business card for future reference. Chaplains remain available for support. Jen Villanueva MDiv Board Certified [employer]

## 2019-05-06 NOTE — PROGRESS NOTES
MANJU reviewed chart. Pt is well known to floor. She is here for next round of chemo 7+3. She was recently discharged from floor on last week. No needs identified at this time. MANJU will continue to monitor and follow up. Demographics verified. Care Management Interventions PCP Verified by CM: Yes Mode of Transport at Discharge: Self Transition of Care Consult (CM Consult): Discharge Planning Discharge Durable Medical Equipment: No 
Physical Therapy Consult: No 
Occupational Therapy Consult: No 
Speech Therapy Consult: No 
Current Support Network: Own Home Confirm Follow Up Transport: Family Freedom of Choice Offered: Yes Discharge Location Discharge Placement: Home

## 2019-05-06 NOTE — PROGRESS NOTES
Problem: Falls - Risk of 
Goal: *Absence of Falls Description Document Nova Salas Fall Risk and appropriate interventions in the flowsheet. Outcome: Progressing Towards Goal 
  
Problem: Anxiety Goal: *Alleviation of anxiety Outcome: Progressing Towards Goal

## 2019-05-06 NOTE — PROGRESS NOTES
Problem: Falls - Risk of 
Goal: *Absence of Falls Description Document Guadalupe Khan Fall Risk and appropriate interventions in the flowsheet.  
Outcome: Progressing Towards Goal

## 2019-05-06 NOTE — PROGRESS NOTES
Pt had bmbx scheduled for today.  Will reschedule for in the am. Platelets are allocated and will be given in am.

## 2019-05-06 NOTE — PROGRESS NOTES
[facility] Hematology & Oncology Inpatient Hematology / Oncology Progress Note Admission Date: 2019 10:53 AM 
Reason for Admission/Hospital Course: Admission for antineoplastic chemotherapy [Z51.11] 24 Hour Events: 
VSS/No fevers Cultures from port NTD Day 2 vanc/cefe BMbx tomorrow Start date of 7+3 pending culture results ROS: 
Constitutional:  negative for fever, chills, weakness, malaise, fatigue. CV:  negative for chest pain, palpitations, edema. Respiratory: negative for dyspnea, cough, wheezing. GI: negative for nausea, abdominal pain, diarrhea. 10 point review of systems is otherwise negative with the exception of the elements mentioned above in the HPI. No Known Allergies OBJECTIVE: 
Patient Vitals for the past 8 hrs: 
 BP Temp Pulse Resp SpO2  
19 0749  (!) 78 °F (25.6 °C)     
19 0426 141/56 98.4 °F (36.9 °C) 75 16 96 % Temp (24hrs), Av.2 °F (35.1 °C), Min:78 °F (25.6 °C), Max:99.2 °F (37.3 °C) No intake/output data recorded. Physical Exam: 
Constitutional: Well developed, well nourished female in no acute distress, sitting comfortably in the hospital bed. HEENT: Normocephalic and atraumatic. Oropharynx is clear, mucous membranes are moist.  Pupils are equal, round, and reactive to light. Extraocular muscles are intact. Sclerae anicteric. Skin Warm and dry. No bruising and no rash noted. No erythema. No pallor. Right port improved today ?irritation from dressing. Respiratory Lungs are clear to auscultation bilaterally without wheezes, rales or rhonchi, normal air exchange without accessory muscle use. CVS Normal rate, regular rhythm and normal S1 and S2. No murmurs, gallops, or rubs. Abdomen Soft, nontender and nondistended, normoactive bowel sounds. Neuro Grossly nonfocal with no obvious sensory or motor deficits. MSK Normal range of motion in general.  No edema and no tenderness. Psych Appropriate mood and affect. Labs: 
   
Recent Labs 05/06/19 
0511 05/05/19 
1251 05/04/19 
1343 WBC 7.5 9.3 9.9  
RBC 2.22* 2.26* 2.40* HGB 7.3* 7.4* 7.8* HCT 22.0* 22.2* 23.6* MCV 99.1* 98.2* 98.3*  
MCH 32.9 32.7 32.5 MCHC 33.2 33.3 33.1 RDW 15.9* 15.8* 15.9*  
PLT 11* 17* 25* GRANS 7* 4*  --   
LYMPH 53* 29 45* MONOS  --   --  14* EOS  --  2  --   
DF MANUAL AUTOMATED MANUAL ANEU 0.5* 0.5*  -- ABL 4.0 2.7 4.5 ABM  --   --  1.4* ALEKSANDR  --  0.2  --   
 
  
Recent Labs 05/06/19 
0511 05/05/19 
1251 05/04/19 
1343  140 141  
K 3.5 3.6 3.6 * 108* 109* CO2 24 24 27 AGAP 7 8 5* * 99 89 BUN 11 12 13 CREA 0.74 0.80 0.75 GFRAA >60 >60 >60 GFRNA >60 >60 >60  
CA 8.7 8.7 8.8 SGOT 14* 19 16 AP 75 79 81  
TP 6.7 7.0 7.1 ALB 3.3 3.5 3.5 GLOB 3.4 3.5 3.6* AGRAT 1.0* 1.0* 1.0*  
MG 2.2 2.2 2.3 Imaging: 
 
Medications: 
Current Facility-Administered Medications Medication Dose Route Frequency  0.9% sodium chloride infusion 250 mL  250 mL IntraVENous PRN  
 acetaminophen/diphenhydrAMINE (TYLENOL PM EXT STR) 500/25 mg   Oral QHS  acyclovir (ZOVIRAX) tablet 400 mg  400 mg Oral BID  allopurinol (ZYLOPRIM) tablet 300 mg  300 mg Oral DAILY  calcium carbonate (OS-CHRIS) tablet 500 mg [elemental]  500 mg Oral TID WITH MEALS  escitalopram oxalate (LEXAPRO) tablet 10 mg  10 mg Oral DAILY  fluconazole (DIFLUCAN) tablet 200 mg  200 mg Oral DAILY  lidocaine-prilocaine (EMLA) 2.5-2.5 % cream   Topical PRN  
 LORazepam (ATIVAN) tablet 1 mg  1 mg Oral QHS  ondansetron (ZOFRAN ODT) tablet 8 mg  8 mg Oral Q8H PRN  pravastatin (PRAVACHOL) tablet 10 mg  10 mg Oral QHS  vit C-E-zinc cit-lutein-zeaxan 50 mg-15 unit- 4.5 mg-2.5 mg chew (OCU-ABDOUL) 1 Tab (Patient Supplied)  1 Tab Oral DAILY  cefepime (MAXIPIME) 2 g in 0.9% sodium chloride (MBP/ADV) 100 mL  2 g IntraVENous Q12H  vancomycin (VANCOCIN) 1,000 mg in 0.9% sodium chloride (MBP/ADV) 250 mL  1,000 mg IntraVENous Q12H  
 [START ON 5/8/2019] ergocalciferol capsule 50,000 Units  50,000 Units Oral every Wednesday ASSESSMENT: 
 
Problem List  Date Reviewed: 4/30/2019 Codes Class Noted Admission for antineoplastic chemotherapy ICD-10-CM: Z51.11 ICD-9-CM: V58.11  5/5/2019 AML (acute myeloblastic leukemia) ([facility] ICD-10-CM: C92.00 ICD-9-CM: 205.00  4/28/2019 Weakness generalized ICD-10-CM: R53.1 ICD-9-CM: 780.79  4/28/2019 Pancytopenia ([facility] ICD-10-CM: G07.044 ICD-9-CM: 284.19  4/28/2019 Thrombocytopenia ([facility] ICD-10-CM: D69.6 ICD-9-CM: 287.5  4/27/2019 PLAN: 
AML FLT 3+ 
5/6  BMbx planned for Tuesday then wait for [facility] from port results to determine start date of  cycle one 7 + 3 with Midostaurin day 8-21. Platelets will need to be at least 50k for bx tomorrow 
  
Possible port infection 5/5 Day one vanc/cefe. Follow cultures. Do not use port. May need to have Echo if cultures positive. 5/6 BCNTD. Appears improved today. Continue to monitor. No fevers or other symptoms.  
  
 
Alexander SOPs Supportive care Goals and plan of care reviewed with the patient. All questions answered to the best of our ability. Abran Hall NP [facility] Hematology & Oncology [id number] [address] Office : [phone] Fax : [phone] I personally saw, exammed and counselled the patient, and discussed with NP, agree with above history/assessment/plan. 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 Lynnette Mcmanus M.D. [address] Office : [phone] Fax : [phone]

## 2019-05-07 ENCOUNTER — APPOINTMENT (OUTPATIENT)
Dept: CT IMAGING | Age: 74
DRG: 837 | End: 2019-05-07
Attending: NURSE PRACTITIONER
Payer: MEDICARE

## 2019-05-07 LAB
ALBUMIN SERPL-MCNC: 3.3 G/DL (ref 3.2–4.6)
ALBUMIN/GLOB SERPL: 0.9 {RATIO} (ref 1.2–3.5)
ALP SERPL-CCNC: 78 U/L (ref 50–136)
ALT SERPL-CCNC: 26 U/L (ref 12–65)
ANION GAP SERPL CALC-SCNC: 8 MMOL/L (ref 7–16)
AST SERPL-CCNC: 26 U/L (ref 15–37)
BILIRUB SERPL-MCNC: 0.5 MG/DL (ref 0.2–1.1)
BLASTS NFR BLD MANUAL: 37 %
BUN SERPL-MCNC: 11 MG/DL (ref 8–23)
CALCIUM SERPL-MCNC: 8.8 MG/DL (ref 8.3–10.4)
CHLORIDE SERPL-SCNC: 109 MMOL/L (ref 98–107)
CO2 SERPL-SCNC: 24 MMOL/L (ref 21–32)
CREAT SERPL-MCNC: 0.73 MG/DL (ref 0.6–1)
DIFFERENTIAL METHOD BLD: ABNORMAL
ERYTHROCYTE [DISTWIDTH] IN BLOOD BY AUTOMATED COUNT: 15.9 % (ref 11.9–14.6)
GLOBULIN SER CALC-MCNC: 3.6 G/DL (ref 2.3–3.5)
GLUCOSE SERPL-MCNC: 98 MG/DL (ref 65–100)
HCT VFR BLD AUTO: 20.5 % (ref 35.8–46.3)
HGB BLD-MCNC: 6.9 G/DL (ref 11.7–15.4)
LYMPHOCYTES # BLD: 3.2 K/UL (ref 0.5–4.6)
LYMPHOCYTES NFR BLD MANUAL: 54 % (ref 16–44)
MAGNESIUM SERPL-MCNC: 2.2 MG/DL (ref 1.8–2.4)
MCH RBC QN AUTO: 33.5 PG (ref 26.1–32.9)
MCHC RBC AUTO-ENTMCNC: 33.7 G/DL (ref 31.4–35)
MCV RBC AUTO: 99.5 FL (ref 79.6–97.8)
MONOCYTES # BLD: 0.1 K/UL (ref 0.1–1.3)
MONOCYTES NFR BLD MANUAL: 1 % (ref 3–9)
NEUTS SEG # BLD: 0.5 K/UL (ref 1.7–8.2)
NEUTS SEG NFR BLD MANUAL: 8 % (ref 47–75)
NRBC # BLD: 0 K/UL (ref 0–0.2)
PHOSPHATE SERPL-MCNC: 2.9 MG/DL (ref 2.3–3.7)
PLATELET # BLD AUTO: 54 K/UL (ref 150–450)
PLATELET COMMENTS,PCOM: ABNORMAL
PMV BLD AUTO: 10.3 FL (ref 9.4–12.3)
POTASSIUM SERPL-SCNC: 3.4 MMOL/L (ref 3.5–5.1)
PROT SERPL-MCNC: 6.9 G/DL (ref 6.3–8.2)
RBC # BLD AUTO: 2.06 M/UL (ref 4.05–5.2)
RBC MORPH BLD: ABNORMAL
RBC MORPH BLD: ABNORMAL
SODIUM SERPL-SCNC: 141 MMOL/L (ref 136–145)
URATE SERPL-MCNC: 4 MG/DL (ref 2.6–6)
WBC # BLD AUTO: 6 K/UL (ref 4.3–11.1)
WBC MORPH BLD: ABNORMAL

## 2019-05-07 PROCEDURE — P9040 RBC LEUKOREDUCED IRRADIATED: HCPCS

## 2019-05-07 PROCEDURE — C1894 INTRO/SHEATH, NON-LASER: HCPCS

## 2019-05-07 PROCEDURE — 30233R1 TRANSFUSION OF NONAUTOLOGOUS PLATELETS INTO PERIPHERAL VEIN, PERCUTANEOUS APPROACH: ICD-10-PCS | Performed by: INTERNAL MEDICINE

## 2019-05-07 PROCEDURE — 88312 SPECIAL STAINS GROUP 1: CPT

## 2019-05-07 PROCEDURE — 99233 SBSQ HOSP IP/OBS HIGH 50: CPT | Performed by: INTERNAL MEDICINE

## 2019-05-07 PROCEDURE — 36415 COLL VENOUS BLD VENIPUNCTURE: CPT

## 2019-05-07 PROCEDURE — 74011250636 HC RX REV CODE- 250/636: Performed by: NURSE PRACTITIONER

## 2019-05-07 PROCEDURE — 65270000029 HC RM PRIVATE

## 2019-05-07 PROCEDURE — 88313 SPECIAL STAINS GROUP 2: CPT

## 2019-05-07 PROCEDURE — 74011000258 HC RX REV CODE- 258: Performed by: NURSE PRACTITIONER

## 2019-05-07 PROCEDURE — 88311 DECALCIFY TISSUE: CPT

## 2019-05-07 PROCEDURE — 74011250637 HC RX REV CODE- 250/637: Performed by: NURSE PRACTITIONER

## 2019-05-07 PROCEDURE — 74011250636 HC RX REV CODE- 250/636: Performed by: RADIOLOGY

## 2019-05-07 PROCEDURE — 07DR3ZZ EXTRACTION OF ILIAC BONE MARROW, PERCUTANEOUS APPROACH: ICD-10-PCS | Performed by: RADIOLOGY

## 2019-05-07 PROCEDURE — 84100 ASSAY OF PHOSPHORUS: CPT

## 2019-05-07 PROCEDURE — 77030039270 HC TU BLD FLTR CARD -A

## 2019-05-07 PROCEDURE — 88185 FLOWCYTOMETRY/TC ADD-ON: CPT

## 2019-05-07 PROCEDURE — 85025 COMPLETE CBC W/AUTO DIFF WBC: CPT

## 2019-05-07 PROCEDURE — 87040 BLOOD CULTURE FOR BACTERIA: CPT

## 2019-05-07 PROCEDURE — 88305 TISSUE EXAM BY PATHOLOGIST: CPT

## 2019-05-07 PROCEDURE — 83735 ASSAY OF MAGNESIUM: CPT

## 2019-05-07 PROCEDURE — 65270000015 HC RM PRIVATE ONCOLOGY

## 2019-05-07 PROCEDURE — 77030028872 CT BX BONE MARROW AND ASPIRATION

## 2019-05-07 PROCEDURE — 74011250637 HC RX REV CODE- 250/637: Performed by: INTERNAL MEDICINE

## 2019-05-07 PROCEDURE — 80053 COMPREHEN METABOLIC PANEL: CPT

## 2019-05-07 PROCEDURE — 74011250636 HC RX REV CODE- 250/636: Performed by: INTERNAL MEDICINE

## 2019-05-07 PROCEDURE — 36430 TRANSFUSION BLD/BLD COMPNT: CPT

## 2019-05-07 PROCEDURE — P9037 PLATE PHERES LEUKOREDU IRRAD: HCPCS

## 2019-05-07 PROCEDURE — 88341 IMHCHEM/IMCYTCHM EA ADD ANTB: CPT

## 2019-05-07 PROCEDURE — 99152 MOD SED SAME PHYS/QHP 5/>YRS: CPT

## 2019-05-07 PROCEDURE — 74011250636 HC RX REV CODE- 250/636

## 2019-05-07 PROCEDURE — 86644 CMV ANTIBODY: CPT

## 2019-05-07 PROCEDURE — 30233N1 TRANSFUSION OF NONAUTOLOGOUS RED BLOOD CELLS INTO PERIPHERAL VEIN, PERCUTANEOUS APPROACH: ICD-10-PCS | Performed by: INTERNAL MEDICINE

## 2019-05-07 PROCEDURE — 84550 ASSAY OF BLOOD/URIC ACID: CPT

## 2019-05-07 PROCEDURE — 88184 FLOWCYTOMETRY/ TC 1 MARKER: CPT

## 2019-05-07 PROCEDURE — 87086 URINE CULTURE/COLONY COUNT: CPT

## 2019-05-07 PROCEDURE — 88342 IMHCHEM/IMCYTCHM 1ST ANTB: CPT

## 2019-05-07 RX ORDER — ACETAMINOPHEN 325 MG/1
650 TABLET ORAL AS NEEDED
Status: DISCONTINUED | OUTPATIENT
Start: 2019-05-07 | End: 2019-06-07 | Stop reason: HOSPADM

## 2019-05-07 RX ORDER — DIPHENHYDRAMINE HYDROCHLORIDE 50 MG/ML
12.5-5 INJECTION, SOLUTION INTRAMUSCULAR; INTRAVENOUS ONCE
Status: DISCONTINUED | OUTPATIENT
Start: 2019-05-07 | End: 2019-05-07 | Stop reason: ALTCHOICE

## 2019-05-07 RX ORDER — POTASSIUM CHLORIDE 20 MEQ/1
20 TABLET, EXTENDED RELEASE ORAL 2 TIMES DAILY
Status: DISCONTINUED | OUTPATIENT
Start: 2019-05-07 | End: 2019-05-17

## 2019-05-07 RX ORDER — SODIUM CHLORIDE 9 MG/ML
25 INJECTION, SOLUTION INTRAVENOUS ONCE
Status: COMPLETED | OUTPATIENT
Start: 2019-05-07 | End: 2019-05-07

## 2019-05-07 RX ORDER — LIDOCAINE HYDROCHLORIDE 20 MG/ML
20-200 INJECTION, SOLUTION INFILTRATION; PERINEURAL
Status: DISCONTINUED | OUTPATIENT
Start: 2019-05-07 | End: 2019-05-07 | Stop reason: ALTCHOICE

## 2019-05-07 RX ORDER — FENTANYL CITRATE 50 UG/ML
25-100 INJECTION, SOLUTION INTRAMUSCULAR; INTRAVENOUS
Status: DISCONTINUED | OUTPATIENT
Start: 2019-05-07 | End: 2019-05-07 | Stop reason: ALTCHOICE

## 2019-05-07 RX ORDER — MIDAZOLAM HYDROCHLORIDE 1 MG/ML
.25-2 INJECTION, SOLUTION INTRAMUSCULAR; INTRAVENOUS
Status: DISCONTINUED | OUTPATIENT
Start: 2019-05-07 | End: 2019-05-07 | Stop reason: ALTCHOICE

## 2019-05-07 RX ORDER — DIPHENHYDRAMINE HCL 25 MG
25 CAPSULE ORAL AS NEEDED
Status: DISCONTINUED | OUTPATIENT
Start: 2019-05-07 | End: 2019-06-07 | Stop reason: HOSPADM

## 2019-05-07 RX ADMIN — LIDOCAINE HYDROCHLORIDE 100 MG: 20 INJECTION, SOLUTION INFILTRATION; PERINEURAL at 12:37

## 2019-05-07 RX ADMIN — ACYCLOVIR 400 MG: 800 TABLET ORAL at 17:17

## 2019-05-07 RX ADMIN — VANCOMYCIN HYDROCHLORIDE 1000 MG: 1 INJECTION, POWDER, LYOPHILIZED, FOR SOLUTION INTRAVENOUS at 00:30

## 2019-05-07 RX ADMIN — ALLOPURINOL 300 MG: 300 TABLET ORAL at 09:41

## 2019-05-07 RX ADMIN — Medication 500 MG: at 09:41

## 2019-05-07 RX ADMIN — ESCITALOPRAM OXALATE 10 MG: 10 TABLET ORAL at 09:41

## 2019-05-07 RX ADMIN — DIPHENHYDRAMINE HYDROCHLORIDE 25 MG: 25 CAPSULE ORAL at 03:42

## 2019-05-07 RX ADMIN — FENTANYL CITRATE 25 MCG: 50 INJECTION, SOLUTION INTRAMUSCULAR; INTRAVENOUS at 12:32

## 2019-05-07 RX ADMIN — CEFEPIME HYDROCHLORIDE 2 G: 2 INJECTION, POWDER, FOR SOLUTION INTRAVENOUS at 00:31

## 2019-05-07 RX ADMIN — FLUCONAZOLE 200 MG: 100 TABLET ORAL at 09:41

## 2019-05-07 RX ADMIN — DIPHENHYDRAMINE HYDROCHLORIDE 25 MG: 25 CAPSULE ORAL at 09:14

## 2019-05-07 RX ADMIN — CEFEPIME HYDROCHLORIDE 2 G: 2 INJECTION, POWDER, FOR SOLUTION INTRAVENOUS at 14:09

## 2019-05-07 RX ADMIN — POTASSIUM CHLORIDE 20 MEQ: 20 TABLET, EXTENDED RELEASE ORAL at 09:40

## 2019-05-07 RX ADMIN — PRAVASTATIN SODIUM 10 MG: 20 TABLET ORAL at 22:18

## 2019-05-07 RX ADMIN — POTASSIUM CHLORIDE 20 MEQ: 20 TABLET, EXTENDED RELEASE ORAL at 17:16

## 2019-05-07 RX ADMIN — MIDAZOLAM HYDROCHLORIDE 1 MG: 1 INJECTION, SOLUTION INTRAMUSCULAR; INTRAVENOUS at 12:25

## 2019-05-07 RX ADMIN — Medication 500 MG: at 14:09

## 2019-05-07 RX ADMIN — ACETAMINOPHEN 650 MG: 325 TABLET, FILM COATED ORAL at 09:14

## 2019-05-07 RX ADMIN — FENTANYL CITRATE 25 MCG: 50 INJECTION, SOLUTION INTRAMUSCULAR; INTRAVENOUS at 12:39

## 2019-05-07 RX ADMIN — MIDAZOLAM HYDROCHLORIDE 0.5 MG: 1 INJECTION, SOLUTION INTRAMUSCULAR; INTRAVENOUS at 12:32

## 2019-05-07 RX ADMIN — VANCOMYCIN HYDROCHLORIDE 1000 MG: 1 INJECTION, POWDER, LYOPHILIZED, FOR SOLUTION INTRAVENOUS at 14:53

## 2019-05-07 RX ADMIN — FENTANYL CITRATE 50 MCG: 50 INJECTION, SOLUTION INTRAMUSCULAR; INTRAVENOUS at 12:25

## 2019-05-07 RX ADMIN — SODIUM CHLORIDE 25 ML/HR: 900 INJECTION, SOLUTION INTRAVENOUS at 12:17

## 2019-05-07 RX ADMIN — ACETAMINOPHEN: 500 TABLET, FILM COATED ORAL at 22:18

## 2019-05-07 RX ADMIN — Medication 500 MG: at 17:17

## 2019-05-07 RX ADMIN — ACETAMINOPHEN 650 MG: 325 TABLET, FILM COATED ORAL at 03:42

## 2019-05-07 RX ADMIN — LORAZEPAM 1 MG: 1 TABLET ORAL at 22:18

## 2019-05-07 RX ADMIN — SODIUM CHLORIDE 250 ML: 900 INJECTION, SOLUTION INTRAVENOUS at 14:10

## 2019-05-07 RX ADMIN — ACYCLOVIR 400 MG: 800 TABLET ORAL at 09:40

## 2019-05-07 NOTE — PROGRESS NOTES
[facility] Hematology & Oncology Inpatient Hematology / Oncology Progress Note Admission Date: 2019 10:53 AM 
Reason for Admission/Hospital Course: Admission for antineoplastic chemotherapy [Z51.11] 24 Hour Events: 
VSS/No fevers Cultures from port NTD Day 3 vanc/cefe BMbx today Start date of 7+3 TBD 
 
 
ROS: 
Constitutional:  negative for fever, chills, weakness, malaise, fatigue. CV:  negative for chest pain, palpitations, edema. Respiratory: negative for dyspnea, cough, wheezing. GI: negative for nausea, abdominal pain, diarrhea. 10 point review of systems is otherwise negative with the exception of the elements mentioned above in the HPI. No Known Allergies OBJECTIVE: 
Patient Vitals for the past 8 hrs: 
 BP Temp Pulse Resp SpO2  
19 0814 113/43 98.6 °F (37 °C) 76 18 94 % 19 0500 126/52 99.1 °F (37.3 °C) 77 18 92 % 19 0415 113/43 99.6 °F (37.6 °C) 75 16 93 % 19 0400 110/46 99.1 °F (37.3 °C) 77 18 96 % Temp (24hrs), Av.2 °F (37.3 °C), Min:98.4 °F (36.9 °C), Max:99.7 °F (37.6 °C) 
 
701 -  1900 In: -  
Out: 635 [[id number]] Physical Exam: 
Constitutional: Well developed, well nourished female in no acute distress, sitting comfortably in the hospital bed. HEENT: Normocephalic and atraumatic. Oropharynx is clear, mucous membranes are moist.  Pupils are equal, round, and reactive to light. Extraocular muscles are intact. Sclerae anicteric. Skin Warm and dry. No bruising and no rash noted. No erythema. No pallor. Right port site much improved today-looks more like a small hematoma/bruise than infection Respiratory Lungs are clear to auscultation bilaterally without wheezes, rales or rhonchi, normal air exchange without accessory muscle use. CVS Normal rate, regular rhythm and normal S1 and S2. No murmurs, gallops, or rubs. Abdomen Soft, nontender and nondistended, normoactive bowel sounds. Neuro Grossly nonfocal with no obvious sensory or motor deficits. MSK Normal range of motion in general.  No edema and no tenderness. Psych Appropriate mood and affect. Labs: 
   
Recent Labs 05/07/19 
0539 05/06/19 
0511 05/05/19 
1251 05/04/19 
1343 WBC 6.0 7.5 9.3 9.9  
RBC 2.06* 2.22* 2.26* 2.40* HGB 6.9* 7.3* 7.4* 7.8* HCT 20.5* 22.0* 22.2* 23.6* MCV 99.5* 99.1* 98.2* 98.3*  
MCH 33.5* 32.9 32.7 32.5 MCHC 33.7 33.2 33.3 33.1 RDW 15.9* 15.9* 15.8* 15.9*  
PLT 54* 11* 17* 25* GRANS 8* 7* 4*  --   
LYMPH 54* 53* 29 45* MONOS 1*  --   --  14* EOS  --   --  2  --   
DF MANUAL MANUAL AUTOMATED MANUAL ANEU 0.5* 0.5* 0.5*  -- ABL 3.2 4.0 2.7 4.5 ABM 0.1  --   --  1.4* ALEKSANDR  --   --  0.2  --   
 
  
Recent Labs 05/07/19 
0539 05/06/19 
0511 05/05/19 
1251  142 140  
K 3.4* 3.5 3.6 * 111* 108* CO2 24 24 24 AGAP 8 7 8 GLU 98 104* 99 BUN 11 11 12 CREA 0.73 0.74 0.80 GFRAA >60 >60 >60 GFRNA >60 >60 >60  
CA 8.8 8.7 8.7 SGOT 26 14* 19  
AP 78 75 79  
TP 6.9 6.7 7.0 ALB 3.3 3.3 3.5 GLOB 3.6* 3.4 3.5 AGRAT 0.9* 1.0* 1.0*  
MG 2.2 2.2 2.2 PHOS 2.9  --   --   
 
 
 
Imaging: 
 
Medications: 
Current Facility-Administered Medications Medication Dose Route Frequency  acetaminophen (TYLENOL) tablet 650 mg  650 mg Oral PRN  
 diphenhydrAMINE (BENADRYL) capsule 25 mg  25 mg Oral PRN  potassium chloride (K-DUR, KLOR-CON) SR tablet 20 mEq  20 mEq Oral BID  
 0.9% sodium chloride infusion 250 mL  250 mL IntraVENous PRN  Vancomycin trough reminder   Other ONCE  
 acetaminophen/diphenhydrAMINE (TYLENOL PM EXT STR) 500/25 mg   Oral QHS  acyclovir (ZOVIRAX) tablet 400 mg  400 mg Oral BID  allopurinol (ZYLOPRIM) tablet 300 mg  300 mg Oral DAILY  calcium carbonate (OS-CHRIS) tablet 500 mg [elemental]  500 mg Oral TID WITH MEALS  escitalopram oxalate (LEXAPRO) tablet 10 mg  10 mg Oral DAILY  fluconazole (DIFLUCAN) tablet 200 mg  200 mg Oral DAILY  lidocaine-prilocaine (EMLA) 2.5-2.5 % cream   Topical PRN  
 LORazepam (ATIVAN) tablet 1 mg  1 mg Oral QHS  ondansetron (ZOFRAN ODT) tablet 8 mg  8 mg Oral Q8H PRN  pravastatin (PRAVACHOL) tablet 10 mg  10 mg Oral QHS  vit C-E-zinc cit-lutein-zeaxan 50 mg-15 unit- 4.5 mg-2.5 mg chew (OCU-ABDOUL) 1 Tab (Patient Supplied)  1 Tab Oral DAILY  cefepime (MAXIPIME) 2 g in 0.9% sodium chloride (MBP/ADV) 100 mL  2 g IntraVENous Q12H  
 vancomycin (VANCOCIN) 1,000 mg in 0.9% sodium chloride (MBP/ADV) 250 mL  1,000 mg IntraVENous Q12H  
 [START ON 5/8/2019] ergocalciferol capsule 50,000 Units  50,000 Units Oral every Wednesday ASSESSMENT: 
 
Problem List  Date Reviewed: 4/30/2019 Codes Class Noted Admission for antineoplastic chemotherapy ICD-10-CM: Z51.11 ICD-9-CM: V58.11  5/5/2019 AML (acute myeloblastic leukemia) ([facility] ICD-10-CM: C92.00 ICD-9-CM: 205.00  4/28/2019 Weakness generalized ICD-10-CM: R53.1 ICD-9-CM: 780.79  4/28/2019 [facility]enia [facility] ICD-10-CM: B55.110 ICD-9-CM: 284.19  4/28/2019 Thrombocytopenia ([facility] ICD-10-CM: D69.6 ICD-9-CM: 287.5  4/27/2019 PLAN: 
AML FLT 3+ 
5/6  BMbx planned for Tuesday then wait for [facility] from port results to determine start date of  cycle one 7 + 3 with Midostaurin day 8-21. Platelets will need to be at least 50k for bx tomorrow 5/7. BMbx today-platelets prior. Also needed prbc today for hgb 6.9. 
  
Possible port infection 5/5 Day one vanc/cefe. Follow cultures. Do not use port. May need to have Echo if cultures positive. 5/6 BCNTD. Appears improved today. Continue to monitor. No fevers or other symptoms. 5/7 Day 3 vanc/cefe. Site appears even better today.  
  
[facility] SOPs Supportive care ACV/Diflucan 
LVQ dc'd while on cefe and vanc Goals and plan of care reviewed with the patient. All questions answered to the best of our ability. 5/7/2019 Email sent to primary Dr. Adal Gonzalez to determine plan of care. Treatment start date TBD. Addendum: per Dr. Angie Horn port improved in appearance on abx then I would assume it was infected inspite of -ve cultures and still have it removed, w/ IR to put PICC in\". IR consulted for PICC/CVC placement. Irais Mireles, ARMANDO [address] [facility] Hematology & Oncology [address] [place] [address] Office : [phone] Fax : [phone] I personally saw, exammed and counselled the patient, and discussed with NP, agree with above history/assessment/plan. 68 y. o.female new dx of AML FLT3+ transferred to [facility] for rx, however port was red and swollen and started vanc/cefepime for cellulitis/port infection, will follow blood culture to determine whether need to remove port, desired to salvage given prior PICC placement failure, midostaurin ordered and marrow biopsy planned to complete study. Port removal was scheduled and Dr. Alexey Mcgill discussed with me for lack of sign for active port infection and placing any other line would likely cause similar bruising, discussed with Dr. Adal Gonzalez and decided to keep port and continue antibiotics now, prepare to start induction. 
Ledy Leavitt M.D. [address] Office : [phone] Fax : [phone]

## 2019-05-07 NOTE — PROCEDURES
Department of Interventional Radiology 
[phone] Interventional Radiology Brief Procedure Note Patient: Macrina Seen MRN: [id number]  SSN: [id number] YOB: 1945  Age: 68 y.o. Sex: female Date of Procedure: 5/7/2019 Pre-Procedure Diagnosis: AML Post-Procedure Diagnosis: SAME Procedure(s): Image Guided Biopsy Brief Description of Procedure: CT guided bone marrow aspiration and core biopsy Performed By: Avni Buchanan PA-C Assistants: None Anesthesia:Moderate Sedation Estimated Blood Loss: Less than 10ml Specimens:  Pathology Implants:  None Findings: no post bx bleeding Complications: None Recommendations: bedrest  
 
Follow Up: referring MD 
 
Signed By: Avni Buchanan PA-C May 7, 2019

## 2019-05-07 NOTE — PROGRESS NOTES
END OF SHIFT NOTE: 
 
Intake/Output 05/06 1901 - 05/07 0700 In: 1250 [P.O.:660; I.V.:350] Out: 1000 [Urine:1000] Voiding: YES Catheter: NO 
Drain:   
 
 
 
 
 
Stool:  0 occurrences. Emesis:  0 occurrences. VITAL SIGNS Patient Vitals for the past 12 hrs: 
 Temp Pulse Resp BP SpO2  
05/07/19 0500 99.1 °F (37.3 °C) 77 18 126/52 92 % 05/07/19 0415 99.6 °F (37.6 °C) 75 16 113/43 93 % 05/07/19 0400 99.1 °F (37.3 °C) 77 18 110/46 96 % 05/06/19 2220 99.7 °F (37.6 °C) 80 16 134/56 96 % 05/06/19 1902 99.3 °F (37.4 °C) 87 20 163/53 95 % Pain Assessment Pain 1 Pain Scale 1: Numeric (0 - 10) (05/07/19 0120) Pain Intensity 1: 0 (05/07/19 0120) Patient Stated Pain Goal: 0 (05/07/19 0120) Ambulating Yes Additional Information: Afebrile overnight;no bleeding. For [facility] this AM;1 unit platelets transfused early this morning;pending rpt plt ct results. Slept fairly well w/ CPAP. Shift report given to oncjose e Bermeo,RN  at the bedside.  
 
Luis Armando Anderson RN

## 2019-05-07 NOTE — PROGRESS NOTES
END OF SHIFT NOTE: 
 
Intake/Output No intake/output data recorded. Voiding: YES Catheter: NO 
Drain:   
 
 
 
 
 
Stool:  0 occurrences. Emesis:  0 occurrences. VITAL SIGNS Patient Vitals for the past 12 hrs: 
 Temp Pulse Resp BP SpO2  
05/07/19 1520 98.3 °F (36.8 °C) 76 20 114/44 100 % 05/07/19 1351  69  156/68 95 % 05/07/19 1341  68  159/70 94 % 05/07/19 1332  69  154/71 93 % 05/07/19 1322  68  145/66 93 % 05/07/19 1312  68  147/67 93 % 05/07/19 1301  68  154/69 92 % 05/07/19 1254  71 16 140/64   
05/07/19 1240  70 12 143/68 96 % 05/07/19 1235  68 14 134/61 96 % 05/07/19 1230  70 16 148/66 95 % 05/07/19 1225  68 18 141/63 96 % 05/07/19 1220 98 °F (36.7 °C) 70 16 157/74 96 % 05/07/19 1125 98.8 °F (37.1 °C)      
05/07/19 1031 99.4 °F (37.4 °C) 70 18 116/43 96 % 05/07/19 0955 97.9 °F (36.6 °C) 73 18 102/50 95 % 05/07/19 0814 98.6 °F (37 °C) 76 18 113/43 94 % Pain Assessment Pain 1 Pain Scale 1: Numeric (0 - 10) (05/07/19 1425) Pain Intensity 1: 0 (05/07/19 1425) Patient Stated Pain Goal: 0 (05/07/19 1425) Ambulating Yes Additional Information: Pt had bmbx today. To start 7+3 tomorrow. VSS. No needs stated at this time. Shift report given to oncoming nurse at the bedside. Kaushal Rico

## 2019-05-07 NOTE — PROGRESS NOTES
I have spoken to Dr Benton Boxer and to Dr Ashely Hernández. Today, there is ecchymosis (only) involving the skin overlying the port and catheter. The ecchymosis varies from purple-red at the incision to yellow-green in the periphery. Her RUE has a similar appearance following unsuccessful PICC placement earlier this week. The ecchymosis is expected given the thrombocytopenia. She has intermittent low grade fevers (Tmax 99.6) but does not feel or appear toxic. Blood cultures remain negative since admission. UE \"midline\" catheter placed today for immediate use. At this point, there does not appear to be a port-associated cellulitis, although this will be watched closely while she is an inpatient. If there is any sign of infection, the port will be removed. The port will be rested for at least another 24 hours. Please call with any questions.  
 
Vlad Qureshi MD

## 2019-05-07 NOTE — PROGRESS NOTES
Problem: Falls - Risk of 
Goal: *Absence of Falls Description Document [facility] Fall Risk and appropriate interventions in the flowsheet. Outcome: Progressing Towards Goal 
  
Problem: Pain Goal: *Control of Pain Outcome: Progressing Towards Goal 
  
Problem: Anxiety Goal: *Alleviation of anxiety Outcome: Progressing Towards Goal

## 2019-05-07 NOTE — PROGRESS NOTES
TRANSFER - OUT REPORT: 
 
Verbal report given to Jodi Foster RN (name) on Lindsey Heaton  being transferred to IR recovery (unit) for routine progression of care Report consisted of patients Situation, Background, Assessment and  
Recommendations(SBAR). Information from the following report(s) Procedure Summary and MAR was reviewed with the receiving nurse. Opportunity for questions and clarification was provided. Conscious Sedation:  
100 Mcg of Fentanyl administered 1.5 Mg of Versed administered Pt tolerated procedure well. Visit Vitals /64 (BP 1 Location: Left arm, BP Patient Position: At rest) Pulse 71 Temp 98 °F (36.7 °C) Resp 16 Ht 5' 6\" (1.676 m) Wt 89.5 kg (197 lb 4.8 oz) SpO2 96% Breastfeeding? No  
BMI 31.85 kg/m² Past Medical History:  
Diagnosis Date  Cancer ([facility] AML Peripheral IV 05/07/19 Right Hand (Active) Peripheral IV 05/07/19 Left;Upper Arm (Active) Site Assessment Clean, dry, & intact 5/7/2019 11:52 AM  
Phlebitis Assessment 0 5/7/2019 11:52 AM  
Infiltration Assessment 0 5/7/2019 11:52 AM  
Dressing Status Clean, dry, & intact 5/7/2019 11:52 AM  
Dressing Type Tape;Transparent 5/7/2019 11:52 AM  
Hub Color/Line Status Flushed;Patent 5/7/2019 11:52 AM

## 2019-05-08 LAB
ABO + RH BLD: NORMAL
ALBUMIN SERPL-MCNC: 3.3 G/DL (ref 3.2–4.6)
ALBUMIN/GLOB SERPL: 1 {RATIO} (ref 1.2–3.5)
ALP SERPL-CCNC: 72 U/L (ref 50–136)
ALT SERPL-CCNC: 31 U/L (ref 12–65)
ANION GAP SERPL CALC-SCNC: 7 MMOL/L (ref 7–16)
AST SERPL-CCNC: 24 U/L (ref 15–37)
BILIRUB SERPL-MCNC: 0.5 MG/DL (ref 0.2–1.1)
BLASTS NFR BLD MANUAL: 38 %
BLD PROD TYP BPU: NORMAL
BLD PROD TYP BPU: NORMAL
BLOOD GROUP ANTIBODIES SERPL: NORMAL
BPU ID: NORMAL
BPU ID: NORMAL
BUN SERPL-MCNC: 13 MG/DL (ref 8–23)
CALCIUM SERPL-MCNC: 8.4 MG/DL (ref 8.3–10.4)
CHLORIDE SERPL-SCNC: 109 MMOL/L (ref 98–107)
CO2 SERPL-SCNC: 24 MMOL/L (ref 21–32)
CREAT SERPL-MCNC: 0.79 MG/DL (ref 0.6–1)
CROSSMATCH RESULT,%XM: NORMAL
DIFFERENTIAL METHOD BLD: ABNORMAL
ERYTHROCYTE [DISTWIDTH] IN BLOOD BY AUTOMATED COUNT: 16.7 % (ref 11.9–14.6)
GLOBULIN SER CALC-MCNC: 3.4 G/DL (ref 2.3–3.5)
GLUCOSE SERPL-MCNC: 102 MG/DL (ref 65–100)
HCT VFR BLD AUTO: 22.6 % (ref 35.8–46.3)
HGB BLD-MCNC: 7.5 G/DL (ref 11.7–15.4)
LYMPHOCYTES # BLD: 1.9 K/UL (ref 0.5–4.6)
LYMPHOCYTES NFR BLD MANUAL: 43 % (ref 16–44)
MAGNESIUM SERPL-MCNC: 2 MG/DL (ref 1.8–2.4)
MCH RBC QN AUTO: 32.1 PG (ref 26.1–32.9)
MCHC RBC AUTO-ENTMCNC: 33.2 G/DL (ref 31.4–35)
MCV RBC AUTO: 96.6 FL (ref 79.6–97.8)
MONOCYTES # BLD: 0.4 K/UL (ref 0.1–1.3)
MONOCYTES NFR BLD MANUAL: 8 % (ref 3–9)
MYELOCYTES NFR BLD MANUAL: 5 %
NEUTS SEG # BLD: 0.5 K/UL (ref 1.7–8.2)
NEUTS SEG NFR BLD MANUAL: 6 % (ref 47–75)
NRBC # BLD: 0 K/UL (ref 0–0.2)
PHOSPHATE SERPL-MCNC: 3 MG/DL (ref 2.3–3.7)
PLATELET # BLD AUTO: 44 K/UL (ref 150–450)
PLATELET COMMENTS,PCOM: ABNORMAL
PMV BLD AUTO: 11 FL (ref 9.4–12.3)
POTASSIUM SERPL-SCNC: 3.8 MMOL/L (ref 3.5–5.1)
PROT SERPL-MCNC: 6.7 G/DL (ref 6.3–8.2)
RBC # BLD AUTO: 2.34 M/UL (ref 4.05–5.2)
RBC MORPH BLD: ABNORMAL
RBC MORPH BLD: ABNORMAL
SODIUM SERPL-SCNC: 140 MMOL/L (ref 136–145)
SPECIMEN EXP DATE BLD: NORMAL
STATUS OF UNIT,%ST: NORMAL
STATUS OF UNIT,%ST: NORMAL
UNIT DIVISION, %UDIV: 0
UNIT DIVISION, %UDIV: 0
URATE SERPL-MCNC: 4 MG/DL (ref 2.6–6)
VANCOMYCIN TROUGH SERPL-MCNC: 13.7 UG/ML (ref 5–20)
WBC # BLD AUTO: 4.4 K/UL (ref 4.3–11.1)
WBC MORPH BLD: ABNORMAL

## 2019-05-08 PROCEDURE — 36415 COLL VENOUS BLD VENIPUNCTURE: CPT

## 2019-05-08 PROCEDURE — 74011250636 HC RX REV CODE- 250/636: Performed by: NURSE PRACTITIONER

## 2019-05-08 PROCEDURE — 80053 COMPREHEN METABOLIC PANEL: CPT

## 2019-05-08 PROCEDURE — 84100 ASSAY OF PHOSPHORUS: CPT

## 2019-05-08 PROCEDURE — 83735 ASSAY OF MAGNESIUM: CPT

## 2019-05-08 PROCEDURE — 99233 SBSQ HOSP IP/OBS HIGH 50: CPT | Performed by: INTERNAL MEDICINE

## 2019-05-08 PROCEDURE — 84550 ASSAY OF BLOOD/URIC ACID: CPT

## 2019-05-08 PROCEDURE — 80202 ASSAY OF VANCOMYCIN: CPT

## 2019-05-08 PROCEDURE — 74011250637 HC RX REV CODE- 250/637: Performed by: NURSE PRACTITIONER

## 2019-05-08 PROCEDURE — 65270000029 HC RM PRIVATE

## 2019-05-08 PROCEDURE — 74011000258 HC RX REV CODE- 258: Performed by: NURSE PRACTITIONER

## 2019-05-08 PROCEDURE — 85025 COMPLETE CBC W/AUTO DIFF WBC: CPT

## 2019-05-08 PROCEDURE — 65270000015 HC RM PRIVATE ONCOLOGY

## 2019-05-08 PROCEDURE — 74011250637 HC RX REV CODE- 250/637: Performed by: INTERNAL MEDICINE

## 2019-05-08 RX ORDER — VANCOMYCIN HYDROCHLORIDE
1250 EVERY 12 HOURS
Status: COMPLETED | OUTPATIENT
Start: 2019-05-08 | End: 2019-05-11

## 2019-05-08 RX ADMIN — ESCITALOPRAM OXALATE 10 MG: 10 TABLET ORAL at 09:38

## 2019-05-08 RX ADMIN — VANCOMYCIN HYDROCHLORIDE 1250 MG: 10 INJECTION, POWDER, LYOPHILIZED, FOR SOLUTION INTRAVENOUS at 03:05

## 2019-05-08 RX ADMIN — CEFEPIME HYDROCHLORIDE 2 G: 2 INJECTION, POWDER, FOR SOLUTION INTRAVENOUS at 12:58

## 2019-05-08 RX ADMIN — ACYCLOVIR 400 MG: 800 TABLET ORAL at 17:55

## 2019-05-08 RX ADMIN — POTASSIUM CHLORIDE 20 MEQ: 20 TABLET, EXTENDED RELEASE ORAL at 17:55

## 2019-05-08 RX ADMIN — ALLOPURINOL 300 MG: 300 TABLET ORAL at 09:38

## 2019-05-08 RX ADMIN — POTASSIUM CHLORIDE 20 MEQ: 20 TABLET, EXTENDED RELEASE ORAL at 09:38

## 2019-05-08 RX ADMIN — LORAZEPAM 1 MG: 1 TABLET ORAL at 21:58

## 2019-05-08 RX ADMIN — FLUCONAZOLE 200 MG: 100 TABLET ORAL at 09:39

## 2019-05-08 RX ADMIN — Medication 500 MG: at 17:55

## 2019-05-08 RX ADMIN — ACYCLOVIR 400 MG: 800 TABLET ORAL at 09:38

## 2019-05-08 RX ADMIN — VANCOMYCIN HYDROCHLORIDE 1250 MG: 10 INJECTION, POWDER, LYOPHILIZED, FOR SOLUTION INTRAVENOUS at 15:17

## 2019-05-08 RX ADMIN — CEFEPIME HYDROCHLORIDE 2 G: 2 INJECTION, POWDER, FOR SOLUTION INTRAVENOUS at 01:08

## 2019-05-08 RX ADMIN — ERGOCALCIFEROL 50000 UNITS: 1.25 CAPSULE ORAL at 07:51

## 2019-05-08 RX ADMIN — Medication 500 MG: at 09:38

## 2019-05-08 RX ADMIN — PRAVASTATIN SODIUM 10 MG: 20 TABLET ORAL at 21:59

## 2019-05-08 RX ADMIN — ACETAMINOPHEN: 500 TABLET, FILM COATED ORAL at 21:57

## 2019-05-08 RX ADMIN — Medication 500 MG: at 12:50

## 2019-05-08 NOTE — PROGRESS NOTES
END OF SHIFT NOTE: 
Patient out of bed- recliner. No complain of pain or nausea. Highest temperature 100.0 oral. Consultation with  Infection Disease, Dr. Chelsea Zamora and Ms. Elsi Rowan NP done  this shift. Patient's  present @ bedside. Patient's appetite fair. Intake/Output No intake/output data recorded. Voiding: yes Catheter: no  
Drain:   
 
 
 
 
 
Stool:  No reports occurrences. Emesis:   No  occurrences. VITAL SIGNS Patient Vitals for the past 12 hrs: 
 Temp Pulse Resp BP SpO2  
05/08/19 1510 100 °F (37.8 °C) 88 20 164/80 95 % 05/08/19 1228 99.5 °F (37.5 °C) 73 16 123/52 96 % 05/08/19 0748 98.6 °F (37 °C) 83 18 119/74 98 % Pain Assessment Pain 1 Pain Scale 1: Numeric (0 - 10) (05/07/19 2052) Pain Intensity 1: 0 (05/07/19 2052) Patient Stated Pain Goal: 0 (05/07/19 1425) Ambulating Yes in room - hallway with protective mask in place Additional Information:  See above Shift report given to oncoming nurse Lucy Wang RN  at the bedside.  
 
Jose G Heard RN

## 2019-05-08 NOTE — PROGRESS NOTES
Pharmacokinetic Consult to Pharmacist 
 
Virginia Elida is a 68 y.o. female being treated for SSTI with vancomycin and cefepime. Height: 5' 6\" (167.6 cm)  Weight: 91.2 kg (201 lb 1.6 oz) Lab Results Component Value Date/Time BUN 13 05/08/2019 01:50 AM  
 Creatinine 0.79 05/08/2019 01:50 AM  
 WBC 4.4 05/08/2019 01:50 AM  
  
Estimated Creatinine Clearance: 72.2 mL/min (based on SCr of 0.79 mg/dL). Lab Results Component Value Date/Time Vancomycin,trough 13.7 05/08/2019 01:50 AM  
 
 
Day 4 of vancomycin. Goal trough is 15-20. Dose adjusted to 1.25g q12h. Will continue to follow patient. Thank you, Sarah De La Fuente, Pharm. D. Clinical Pharmacist 
[phone]

## 2019-05-08 NOTE — PROGRESS NOTES
[facility] Hematology & Oncology Inpatient Hematology / Oncology Progress Note Admission Date: 2019 10:53 AM 
Reason for Admission/Hospital Course: Admission for antineoplastic chemotherapy [Z51.11] 24 Hour Events: VSS/fever overnight of 102.5 Cultures on  from port NTD, repeated late last night Day 4 vanc/cefe BMbx  completed Start 7+3 when cleared by ID and afebrile x 48 hours ROS: 
Constitutional:  negative for fever, chills, weakness, malaise, fatigue. CV:  negative for chest pain, palpitations, edema. Respiratory: negative for dyspnea, cough, wheezing. GI: negative for nausea, abdominal pain, diarrhea. 10 point review of systems is otherwise negative with the exception of the elements mentioned above in the HPI. No Known Allergies OBJECTIVE: 
Patient Vitals for the past 8 hrs: 
 BP Temp Pulse Resp SpO2  
19 0748 119/74 98.6 °F (37 °C) 83 18 98 % 19 0400 126/52 99.1 °F (37.3 °C) 73 18 96 % Temp (24hrs), Av.4 °F (37.4 °C), Min:97.9 °F (36.6 °C), Max:102.5 °F (39.2 °C) 
 
 0701 -  1900 In: -  
Out: 737 [[id number]] Physical Exam: 
Constitutional: Well developed, well nourished female in no acute distress, sitting comfortably in the hospital bed. HEENT: Normocephalic and atraumatic. Oropharynx is clear, mucous membranes are moist. Extraocular muscles are intact. Sclerae anicteric. Skin Warm and dry. No rash noted. No erythema. No pallor. Right port site continues to improve - bruising improved. Bruising to upper extremities bilaterally. Respiratory Lungs are clear to auscultation bilaterally without wheezes, rales or rhonchi, normal air exchange without accessory muscle use. CVS Normal rate, regular rhythm and normal S1 and S2. No murmurs, gallops, or rubs. Abdomen Soft, nontender and nondistended, normoactive bowel sounds. Neuro Grossly nonfocal with no obvious sensory or motor deficits. MSK Normal range of motion in general.  No edema and no tenderness. Psych Appropriate mood and affect. Labs: 
   
Recent Labs 05/08/19 
0150 05/07/19 
0539 05/06/19 
0511 05/05/19 
1251 WBC 4.4 6.0 7.5 9.3  
RBC 2.34* 2.06* 2.22* 2.26* HGB 7.5* 6.9* 7.3* 7.4* HCT 22.6* 20.5* 22.0* 22.2*  
MCV 96.6 99.5* 99.1* 98.2*  
MCH 32.1 33.5* 32.9 32.7 MCHC 33.2 33.7 33.2 33.3 RDW 16.7* 15.9* 15.9* 15.8* PLT 44* 54* 11* 17* GRANS 6* 8* 7* 4*  
LYMPH 43 54* 53* 29  
MONOS 8 1*  --   --   
EOS  --   --   --  2  
DF MANUAL MANUAL MANUAL AUTOMATED ANEU 0.5* 0.5* 0.5* 0.5* ABL 1.9 3.2 4.0 2.7 ABM 0.4 0.1  --   --   
ALEKSANDR  --   --   --  0.2 Recent Labs 05/08/19 
0150 05/07/19 
8419 05/06/19 
8936  141 142  
K 3.8 3.4* 3.5 * 109* 111* CO2 24 24 24 AGAP 7 8 7 * 98 104* BUN 13 11 11 CREA 0.79 0.73 0.74 GFRAA >60 >60 >60 GFRNA >60 >60 >60  
CA 8.4 8.8 8.7 SGOT 24 26 14* AP 72 78 75  
TP 6.7 6.9 6.7 ALB 3.3 3.3 3.3 GLOB 3.4 3.6* 3.4 AGRAT 1.0* 0.9* 1.0*  
MG 2.0 2.2 2.2 PHOS 3.0 2.9  --   
 
 
 
Imaging: 
 
Medications: 
Current Facility-Administered Medications Medication Dose Route Frequency  vancomycin (VANCOCIN) 1250 mg in  ml infusion  1,250 mg IntraVENous Q12H  
 acetaminophen (TYLENOL) tablet 650 mg  650 mg Oral PRN  
 diphenhydrAMINE (BENADRYL) capsule 25 mg  25 mg Oral PRN  potassium chloride (K-DUR, KLOR-CON) SR tablet 20 mEq  20 mEq Oral BID  
 0.9% sodium chloride infusion 250 mL  250 mL IntraVENous PRN  
 acetaminophen/diphenhydrAMINE (TYLENOL PM EXT STR) 500/25 mg   Oral QHS  acyclovir (ZOVIRAX) tablet 400 mg  400 mg Oral BID  allopurinol (ZYLOPRIM) tablet 300 mg  300 mg Oral DAILY  calcium carbonate (OS-CHRIS) tablet 500 mg [elemental]  500 mg Oral TID WITH MEALS  escitalopram oxalate (LEXAPRO) tablet 10 mg  10 mg Oral DAILY  fluconazole (DIFLUCAN) tablet 200 mg  200 mg Oral DAILY  lidocaine-prilocaine (EMLA) 2.5-2.5 % cream   Topical PRN  
 LORazepam (ATIVAN) tablet 1 mg  1 mg Oral QHS  ondansetron (ZOFRAN ODT) tablet 8 mg  8 mg Oral Q8H PRN  pravastatin (PRAVACHOL) tablet 10 mg  10 mg Oral QHS  vit C-E-zinc cit-lutein-zeaxan 50 mg-15 unit- 4.5 mg-2.5 mg chew (OCU-ABDOUL) 1 Tab (Patient Supplied)  1 Tab Oral DAILY  cefepime (MAXIPIME) 2 g in 0.9% sodium chloride (MBP/ADV) 100 mL  2 g IntraVENous Q12H  ergocalciferol capsule 50,000 Units  50,000 Units Oral every Wednesday ASSESSMENT: 
 
Problem List  Date Reviewed: 4/30/2019 Codes Class Noted Admission for antineoplastic chemotherapy ICD-10-CM: Z51.11 ICD-9-CM: V58.11  5/5/2019 AML (acute myeloblastic leukemia) ([facility] ICD-10-CM: C92.00 ICD-9-CM: 205.00  4/28/2019 Weakness generalized ICD-10-CM: R53.1 ICD-9-CM: 780.79  4/28/2019 Pancytopenia ([facility] ICD-10-CM: T84.755 ICD-9-CM: 284.19  4/28/2019 Thrombocytopenia ([facility] ICD-10-CM: D69.6 ICD-9-CM: 287.5  4/27/2019 PLAN: 
AML FLT 3+ 
5/6  BMbx planned for Tuesday then wait for [facility] from port results to determine start date of  cycle one 7 + 3 with Midostaurin day 8-21. Platelets will need to be at least 50k for bx tomorrow 5/7. BMbx today-platelets prior. Also needed prbc today for hgb 6.9. 
5/08 Start 7+3 when afebrile per Dr. Claude Ramos.   
  
Possible port infection 5/5 Day one vanc/cefe. Follow cultures. Do not use port. May need to have Echo if cultures positive. 5/6 BCNTD. Appears improved today. Continue to monitor. No fevers or other symptoms. 5/7 Day 3 vanc/cefe. Site appears even better today. 5/8 Does not appear infected. Site bruised. Neutropenic Fever 05/05 Pan-culture negative. On Vancomycin, Maxipime 05/08 Febrile overnight up to 102.5. Repeat cultures x 1. Continue antibiotics. Likely disease related. Consult ID for clearance.   
  
Alexander SOPs Supportive care ACV/Diflucan LVQ dc'd while on cefe and vanc Goals and plan of care reviewed with the patient. All questions answered to the best of our ability. 5/8/2019 BMBx completed 05/07. Start 7+3 next 48 hours if remains afebrile, watch cultures but likely related to disease process. Consult ID per Jesica. Gustavo Valerio NP [facility] Hematology & Oncology [address] [place] [address] Office : [phone] Fax : [phone] I personally saw, exammed and counselled the patient, and discussed with NP, agree with above history/assessment/plan. 73 y. o.female new dx of AML FLT3+ transferred to [facility] for rx, however port was red and swollen and started vanc/cefepime for cellulitis/port infection, will follow blood culture to determine whether need to remove port, desired to salvage given prior PICC placement failure, midostaurin ordered and marrow biopsy planned to complete study. Port removal was scheduled and Dr. Molly Goff discussed with me for lack of sign for active port infection and placing any other line would likely cause similar bruising, discussed with Dr. Bianka Archer and decided to keep port and continue antibiotics now, prepare to start induction. Fever 5/8/19 of no clear reason, repeat cultures but concern leukemia fever, follow marrow for blasts burden, consider start induction this week, follow TLS lab. 
  
 
Lsie Monzon M.D. [facility] [address] Office : [phone] Fax : [phone]

## 2019-05-08 NOTE — CONSULTS
Infectious Disease Consult Today's Date: 5/8/2019 Admit Date: 5/5/2019 Impression: · Fever in setting of newly diagnosed AML, not yet started chemotherapy.  
· S/p PORT placement (4/30) Plan:  
·  Continue Vancomycin and Cefepime. PORT site does not appear (at least now) to be cause of fever, she appears clinically stable. · Follow BCs · Per ID perspective, ok to start chemotherapy. Anti-infectives: · LVQ (4/30-5/5) · Vanc/Cefepime (5/5- Subjective:  
Date of Consultation:  May 8, 2019 Referring [phone] Patient is a 68 y.o. female with a significant medical hx for AML (dx 4/24/19), depression (on Lexapro), and OA, who was admitted for PORT cellulitis. She was supposed to start chemotherapy on 5/5 but PORT noted to be red. PORT just placed 4/30 by [facility] IR. Pt's  states RN on 5/4 \"had to stick her multiple times. \"At admission, BCs obtained (Peripheral and PORT) all remain NGTD. No other signs of infection. Erythema has now evolved to ecchymosis. Remained afebrile until 5/7--spiked to 102.5F. No leukocytosis. Repeat BCs 5/7 NGTD along with UC. She denies N/V, diarrhea, abdominal pain, dysuria, SOB, cough, HA. She did not notice fever, no rigors or sweat accompanied. + postnasal drip but nothing unusual. Dental cleaning done ~2 weeks ago. Patient Active Problem List  
Diagnosis Code  Thrombocytopenia ([address] D69.6  AML (acute myeloblastic leukemia) (HCC) C92.00  Weakness generalized R53.1  Pancytopenia ([address] B62.337  Admission for antineoplastic chemotherapy Z51.11 Past Medical History:  
Diagnosis Date  Cancer ([facility] AML No family history on file. Social History Tobacco Use  Smoking status: Never Smoker  Smokeless tobacco: Never Used Substance Use Topics  Alcohol use: Never Frequency: Never Past Surgical History:  
Procedure Laterality Date  IR INSERT TUNL CVC W PORT OVER 5 YEARS  4/30/2019 Prior to Admission medications Medication Sig Start Date End Date Taking? Authorizing Provider  
lidocaine-prilocaine (EMLA) topical cream Apply  to affected area as needed for Pain. Apply to port site 45-60 minutes prior to lab appt or infusion 5/2/19  Yes Sarah Crow MD  
allopurinol (ZYLOPRIM) 300 mg tablet Take 1 Tab by mouth daily for 30 days. 4/30/19 5/30/19 Yes Marcus Varner NP  
acyclovir (ZOVIRAX) 400 mg tablet Take 1 Tab by mouth two (2) times a day for 30 days. 4/30/19 5/30/19 Yes Marcus Varner NP  
calcium carbonate (OS-CHRIS) 500 mg calcium (1,250 mg) tablet Take 1 Tab by mouth three (3) times daily (with meals). 4/30/19  Yes Marcus Varner NP  
fluconazole (DIFLUCAN) 200 mg tablet Take 1 Tab by mouth daily for 30 days. FDA advises cautious prescribing of oral fluconazole in pregnancy. 4/30/19 5/30/19 Yes Marcus Varner NP  
levoFLOXacin (LEVAQUIN) 500 mg tablet Take 1 Tab by mouth every twenty-four (24) hours for 30 days. 4/30/19 5/30/19 Yes Marcus Varner NP  
cholecalciferol (VITAMIN D3) 50,000 unit capsule Take 1 Cap by mouth every seven (7) days for 8 doses. 4/30/19 6/19/19 Yes Marcus Varner NP  
ondansetron hcl (ZOFRAN) 8 mg tablet Take 1 Tab by mouth every eight (8) hours as needed for Nausea. 4/30/19  Yes Marcus Varner NP  
vit C/E/zinc/lutein/zeaxanthin ([address] PO) Take 1 Tab by mouth daily. Yes Provider, Historical  
escitalopram oxalate (LEXAPRO) 10 mg tablet Take 10 mg by mouth daily. Yes Provider, Historical  
LORazepam (ATIVAN) 1 mg tablet Take  by mouth nightly. Yes Provider, Historical  
acetaminophen 500 mg tab 500 mg, diphenhydrAMINE 25 mg cap 25 mg Take  by mouth nightly. Yes Provider, Historical  
pravastatin (PRAVACHOL) 10 mg tablet Take  by mouth nightly. Yes Provider, Historical  
midostaurin (RYDAPT) 25 mg capsule Take 2 Caps by mouth every twelve (12) hours.  Take on days 8-21 of induction chemo  Indications: acute myeloid leukemia with FLT3 mutation 19   Lily Lopez MD  
 
 
No Known Allergies Review of Systems:  A comprehensive review of systems was negative except for that written in the History of Present Illness. Objective:  
 
Visit Vitals /52 (BP 1 Location: Right arm, BP Patient Position: Sitting) Pulse 73 Temp 99.5 °F (37.5 °C) Resp 16 Ht 5' 6\" (1.676 m) Wt 91.2 kg (201 lb 1.6 oz) SpO2 96% Breastfeeding? No  
BMI 32.46 kg/m² Temp (24hrs), Av.8 °F (37.7 °C), Min:98.3 °F (36.8 °C), Max:102.5 °F (39.2 °C) Lines:  Peripheral IV:   R chest PORT ecchymosis, no drainage. Physical Exam:   
General:  Alert, cooperative, well noursished, well developed, appears stated age Eyes:  Sclera anicteric. Pupils equally round and reactive to light. Mouth/Throat: Mucous membranes normal, oral pharynx clear Neck: Supple Lungs:   Clear to auscultation bilaterally, good effort, RA   
CV:  Regular rate and rhythm,no murmur, click, rub or gallop Abdomen:   Soft, non-tender. bowel sounds normal. non-distended Extremities: No cyanosis. Trace edema Skin: Skin color, texture, turgor normal. Bruising noted Lymph nodes: Cervical and supraclavicular normal  
Musculoskeletal: No swelling or deformity Lines/Devices:  Intact, no erythema, drainage or tenderness Psych: Alert and oriented, normal mood affect given the setting Data Review: CBC: 
Recent Labs 19 
0150 19 
8691 19 
5558 WBC 4.4 6.0 7.5 GRANS 6* 8* 7*  
MONOS 8 1*  --   
ANEU 0.5* 0.5* 0.5* ABL 1.9 3.2 4.0 HGB 7.5* 6.9* 7.3* HCT 22.6* 20.5* 22.0*  
PLT 44* 54* 11* BMP: 
Recent Labs 19 
0150 19 
9377 19 
8525 CREA 0.79 0.73 0.74 BUN 13 11 11  141 142  
K 3.8 3.4* 3.5 * 109* 111* CO2 24 24 24 AGAP 7 8 7 * 98 104* LFTS: 
Recent Labs 19 
0150 19 
5747 19 
7448 TBILI 0.5 0.5 0.4 ALT 31 26 18  
 SGOT 24 26 14* AP 72 78 75  
TP 6.7 6.9 6.7 ALB 3.3 3.3 3.3 Microbiology:  
 
All Micro Results Procedure Component Value Units Date/Time CULTURE, BLOOD [[id number]] Collected:  05/07/19 2241 Order Status:  Completed Specimen:  Blood Updated:  05/08/19 1056 Special Requests: --     
  RIGHT Antecubital 
  
  Culture result: NO GROWTH AFTER 11 HOURS     
 CULTURE, BLOOD [[id number]] Collected:  05/05/19 1309 Order Status:  Completed Specimen:  Blood Updated:  05/08/19 1055 Special Requests: --     
  RIGHT Antecubital 
  
  Culture result: NO GROWTH 3 DAYS     
 CULTURE, BLOOD [[id number]] Collected:  05/05/19 1251 Order Status:  Completed Specimen:  Blood Updated:  05/08/19 1055 Special Requests: SINGLE PORT Culture result: NO GROWTH 3 DAYS     
 CULTURE, URINE [[id number]] Collected:  05/07/19 2321 Order Status:  Completed Specimen:  Urine from Clean catch Updated:  05/08/19 6935 Special Requests: NO SPECIAL REQUESTS Culture result:    
  NO GROWTH AFTER SHORT PERIOD OF INCUBATION. FURTHER RESULTS TO FOLLOW AFTER OVERNIGHT INCUBATION. Imaging:  
See HPI/EPIC Signed By: Miranda Trinidad NP May 8, 2019

## 2019-05-08 NOTE — PROGRESS NOTES
Call placed to [facility] regarding temperature 102.5. Has had blood cultures drawn on 5/5/19 and is currently on vancomycin and cefepime. 2208 Spoke with Yonas Ryan NP. Do one blood culture and get urine culture also. No change in antibiotics.

## 2019-05-09 PROBLEM — C92.00 ACUTE MYELOID LEUKEMIA NOT HAVING ACHIEVED REMISSION (HCC): Status: ACTIVE | Noted: 2019-05-09

## 2019-05-09 LAB
ALBUMIN SERPL-MCNC: 3.3 G/DL (ref 3.2–4.6)
ALBUMIN/GLOB SERPL: 0.9 {RATIO} (ref 1.2–3.5)
ALP SERPL-CCNC: 71 U/L (ref 50–136)
ALT SERPL-CCNC: 28 U/L (ref 12–65)
ANION GAP SERPL CALC-SCNC: 7 MMOL/L (ref 7–16)
AST SERPL-CCNC: 17 U/L (ref 15–37)
BILIRUB SERPL-MCNC: 0.5 MG/DL (ref 0.2–1.1)
BLASTS NFR BLD MANUAL: 41 %
BUN SERPL-MCNC: 9 MG/DL (ref 8–23)
CALCIUM SERPL-MCNC: 8.6 MG/DL (ref 8.3–10.4)
CHLORIDE SERPL-SCNC: 110 MMOL/L (ref 98–107)
CO2 SERPL-SCNC: 24 MMOL/L (ref 21–32)
CREAT SERPL-MCNC: 0.66 MG/DL (ref 0.6–1)
DIFFERENTIAL METHOD BLD: ABNORMAL
ERYTHROCYTE [DISTWIDTH] IN BLOOD BY AUTOMATED COUNT: 17 % (ref 11.9–14.6)
GLOBULIN SER CALC-MCNC: 3.6 G/DL (ref 2.3–3.5)
GLUCOSE SERPL-MCNC: 91 MG/DL (ref 65–100)
HCT VFR BLD AUTO: 24.1 % (ref 35.8–46.3)
HGB BLD-MCNC: 8 G/DL (ref 11.7–15.4)
LYMPHOCYTES # BLD: 1.8 K/UL (ref 0.5–4.6)
LYMPHOCYTES NFR BLD MANUAL: 34 % (ref 16–44)
MAGNESIUM SERPL-MCNC: 2.2 MG/DL (ref 1.8–2.4)
MCH RBC QN AUTO: 32.4 PG (ref 26.1–32.9)
MCHC RBC AUTO-ENTMCNC: 33.2 G/DL (ref 31.4–35)
MCV RBC AUTO: 97.6 FL (ref 79.6–97.8)
MONOCYTES # BLD: 0.8 K/UL (ref 0.1–1.3)
MONOCYTES NFR BLD MANUAL: 15 % (ref 3–9)
NEUTS SEG # BLD: 0.5 K/UL (ref 1.7–8.2)
NEUTS SEG NFR BLD MANUAL: 10 % (ref 47–75)
NRBC # BLD: 0 K/UL (ref 0–0.2)
PHOSPHATE SERPL-MCNC: 2.6 MG/DL (ref 2.3–3.7)
PLATELET # BLD AUTO: 33 K/UL (ref 150–450)
PLATELET COMMENTS,PCOM: ABNORMAL
PMV BLD AUTO: 11.6 FL (ref 9.4–12.3)
POTASSIUM SERPL-SCNC: 3.8 MMOL/L (ref 3.5–5.1)
PROT SERPL-MCNC: 6.9 G/DL (ref 6.3–8.2)
RBC # BLD AUTO: 2.47 M/UL (ref 4.05–5.2)
RBC MORPH BLD: ABNORMAL
SODIUM SERPL-SCNC: 141 MMOL/L (ref 136–145)
URATE SERPL-MCNC: 3.4 MG/DL (ref 2.6–6)
WBC # BLD AUTO: 5.2 K/UL (ref 4.3–11.1)
WBC MORPH BLD: ABNORMAL

## 2019-05-09 PROCEDURE — 65270000015 HC RM PRIVATE ONCOLOGY

## 2019-05-09 PROCEDURE — 74011000258 HC RX REV CODE- 258: Performed by: NURSE PRACTITIONER

## 2019-05-09 PROCEDURE — 77030003560 HC NDL HUBR BARD -A

## 2019-05-09 PROCEDURE — 80053 COMPREHEN METABOLIC PANEL: CPT

## 2019-05-09 PROCEDURE — 74011250636 HC RX REV CODE- 250/636: Performed by: NURSE PRACTITIONER

## 2019-05-09 PROCEDURE — 83735 ASSAY OF MAGNESIUM: CPT

## 2019-05-09 PROCEDURE — 65270000029 HC RM PRIVATE

## 2019-05-09 PROCEDURE — 84550 ASSAY OF BLOOD/URIC ACID: CPT

## 2019-05-09 PROCEDURE — 74011250637 HC RX REV CODE- 250/637: Performed by: INTERNAL MEDICINE

## 2019-05-09 PROCEDURE — 74011250637 HC RX REV CODE- 250/637: Performed by: NURSE PRACTITIONER

## 2019-05-09 PROCEDURE — 85025 COMPLETE CBC W/AUTO DIFF WBC: CPT

## 2019-05-09 PROCEDURE — 84100 ASSAY OF PHOSPHORUS: CPT

## 2019-05-09 PROCEDURE — 99233 SBSQ HOSP IP/OBS HIGH 50: CPT | Performed by: INTERNAL MEDICINE

## 2019-05-09 PROCEDURE — 77030020256 HC SOL INJ NACL 0.9%  500ML

## 2019-05-09 PROCEDURE — 36415 COLL VENOUS BLD VENIPUNCTURE: CPT

## 2019-05-09 PROCEDURE — 74011000258 HC RX REV CODE- 258: Performed by: INTERNAL MEDICINE

## 2019-05-09 PROCEDURE — 74011250636 HC RX REV CODE- 250/636: Performed by: INTERNAL MEDICINE

## 2019-05-09 RX ORDER — ACETAMINOPHEN 325 MG/1
650 TABLET ORAL AS NEEDED
Status: CANCELLED
Start: 2019-05-09

## 2019-05-09 RX ORDER — ONDANSETRON 2 MG/ML
8 INJECTION INTRAMUSCULAR; INTRAVENOUS AS NEEDED
Status: CANCELLED | OUTPATIENT
Start: 2019-05-09

## 2019-05-09 RX ORDER — LORAZEPAM 2 MG/ML
0.5 INJECTION INTRAMUSCULAR
Status: DISCONTINUED | OUTPATIENT
Start: 2019-05-09 | End: 2019-06-07 | Stop reason: HOSPADM

## 2019-05-09 RX ORDER — DEXAMETHASONE SODIUM PHOSPHATE 100 MG/10ML
10 INJECTION INTRAMUSCULAR; INTRAVENOUS EVERY 24 HOURS
Status: COMPLETED | OUTPATIENT
Start: 2019-05-09 | End: 2019-05-16

## 2019-05-09 RX ORDER — DIPHENHYDRAMINE HYDROCHLORIDE 50 MG/ML
50 INJECTION, SOLUTION INTRAMUSCULAR; INTRAVENOUS AS NEEDED
Status: CANCELLED
Start: 2019-05-09

## 2019-05-09 RX ORDER — HYDROCORTISONE SODIUM SUCCINATE 100 MG/2ML
100 INJECTION, POWDER, FOR SOLUTION INTRAMUSCULAR; INTRAVENOUS AS NEEDED
Status: CANCELLED | OUTPATIENT
Start: 2019-05-09

## 2019-05-09 RX ORDER — ALBUTEROL SULFATE 0.83 MG/ML
2.5 SOLUTION RESPIRATORY (INHALATION) AS NEEDED
Status: CANCELLED
Start: 2019-05-09

## 2019-05-09 RX ORDER — EPINEPHRINE 1 MG/ML
0.3 INJECTION, SOLUTION, CONCENTRATE INTRAVENOUS AS NEEDED
Status: CANCELLED | OUTPATIENT
Start: 2019-05-09

## 2019-05-09 RX ORDER — ONDANSETRON 2 MG/ML
8 INJECTION INTRAMUSCULAR; INTRAVENOUS EVERY 8 HOURS
Status: DISPENSED | OUTPATIENT
Start: 2019-05-09 | End: 2019-05-16

## 2019-05-09 RX ADMIN — LORAZEPAM 1 MG: 1 TABLET ORAL at 21:55

## 2019-05-09 RX ADMIN — ALLOPURINOL 300 MG: 300 TABLET ORAL at 08:57

## 2019-05-09 RX ADMIN — DEXAMETHASONE SODIUM PHOSPHATE 10 MG: 10 INJECTION INTRAMUSCULAR; INTRAVENOUS at 14:23

## 2019-05-09 RX ADMIN — ONDANSETRON 8 MG: 2 INJECTION INTRAMUSCULAR; INTRAVENOUS at 14:23

## 2019-05-09 RX ADMIN — ESCITALOPRAM OXALATE 10 MG: 10 TABLET ORAL at 08:58

## 2019-05-09 RX ADMIN — FLUCONAZOLE 200 MG: 100 TABLET ORAL at 08:58

## 2019-05-09 RX ADMIN — Medication 500 MG: at 08:58

## 2019-05-09 RX ADMIN — POTASSIUM CHLORIDE 20 MEQ: 20 TABLET, EXTENDED RELEASE ORAL at 17:45

## 2019-05-09 RX ADMIN — POTASSIUM CHLORIDE 20 MEQ: 20 TABLET, EXTENDED RELEASE ORAL at 08:58

## 2019-05-09 RX ADMIN — Medication 500 MG: at 13:12

## 2019-05-09 RX ADMIN — VANCOMYCIN HYDROCHLORIDE 1250 MG: 10 INJECTION, POWDER, LYOPHILIZED, FOR SOLUTION INTRAVENOUS at 02:38

## 2019-05-09 RX ADMIN — VANCOMYCIN HYDROCHLORIDE 1250 MG: 10 INJECTION, POWDER, LYOPHILIZED, FOR SOLUTION INTRAVENOUS at 16:52

## 2019-05-09 RX ADMIN — ONDANSETRON 8 MG: 2 INJECTION INTRAMUSCULAR; INTRAVENOUS at 20:38

## 2019-05-09 RX ADMIN — ACYCLOVIR 400 MG: 800 TABLET ORAL at 17:45

## 2019-05-09 RX ADMIN — DEXTROSE MONOHYDRATE 25 MG: 5 INJECTION, SOLUTION INTRAVENOUS at 15:55

## 2019-05-09 RX ADMIN — CYTARABINE 205 MG: 100 INJECTION, SOLUTION INTRATHECAL; INTRAVENOUS; SUBCUTANEOUS at 16:11

## 2019-05-09 RX ADMIN — PRAVASTATIN SODIUM 10 MG: 20 TABLET ORAL at 21:54

## 2019-05-09 RX ADMIN — ACETAMINOPHEN: 500 TABLET, FILM COATED ORAL at 21:55

## 2019-05-09 RX ADMIN — ACYCLOVIR 400 MG: 800 TABLET ORAL at 08:57

## 2019-05-09 RX ADMIN — CEFEPIME HYDROCHLORIDE 2 G: 2 INJECTION, POWDER, FOR SOLUTION INTRAVENOUS at 01:06

## 2019-05-09 RX ADMIN — CEFEPIME HYDROCHLORIDE 2 G: 2 INJECTION, POWDER, FOR SOLUTION INTRAVENOUS at 13:09

## 2019-05-09 RX ADMIN — Medication 500 MG: at 17:45

## 2019-05-09 NOTE — PROGRESS NOTES
Patient's ANC 0.5 and Platelet count 17,310. . Nursing  receive orders to proceed with Chemotherapy as  Direct by Ms. Heidi Liu of [address] [place] [address].

## 2019-05-09 NOTE — PROGRESS NOTES
Called to bedside for dual port oozing blood to assess with primary RN. Dressing change done with sterile technique. Surgicel used to help with oozing of blood at port site. Lateral port de-accessed as it was painful for patient with flushing and to touch. Primary RN agreed to keep medial port accessed as patient tolerates well with flushing and to touch. Site is a bit bruised upon assessment. Told primary RN to call MD if bleeding continues. New dressing c/d/i.

## 2019-05-09 NOTE — PROGRESS NOTES
Chemotherapy teaching on Cytarabine  and Idamycin review with patient and her . Both express understanding. Consent for Chemotherapy signed.

## 2019-05-09 NOTE — PROGRESS NOTES
Infectious Disease Note Today's Date: 2019 Admit Date: 2019 Impression: · Fever in setting of newly diagnosed AML, not yet started chemotherapy.  
· S/p PORT placement () Plan:  
·  Continue Vancomycin and Cefepime while neutropenic. · Follow BCs · Per ID perspective, ok to start chemotherapy. Anti-infectives: · LVQ (-) · Vanc/Cefepime (- Subjective:  
Feeling well, ambulates in hallways. Tm 100F No Known Allergies Review of Systems:  A comprehensive review of systems was negative except for that written in the History of Present Illness. Objective:  
 
Visit Vitals /66 (BP 1 Location: Right arm, BP Patient Position: Post activity) Pulse 82 Temp 98 °F (36.7 °C) Resp 18 Ht 5' 6\" (1.676 m) Wt 90.3 kg (199 lb 1.6 oz) SpO2 96% Breastfeeding? No  
BMI 32.14 kg/m² Temp (24hrs), Av °F (37.2 °C), Min:98 °F (36.7 °C), Max:100 °F (37.8 °C) Lines:  Peripheral IV:   R chest PORT ecchymosis, no drainage. Physical Exam:   
General:  Alert, cooperative, well noursished, well developed, appears stated age Eyes:  Sclera anicteric. Pupils equally round and reactive to light. Mouth/Throat: Mucous membranes normal, oral pharynx clear Neck: Supple Lungs:   Clear to auscultation bilaterally, good effort, RA   
CV:  Regular rate and rhythm,no murmur, click, rub or gallop Abdomen:   Soft, non-tender. bowel sounds normal. non-distended Extremities: No cyanosis. Trace edema Skin: Skin color, texture, turgor normal. Bruising noted Lymph nodes: Cervical and supraclavicular normal  
Musculoskeletal: No swelling or deformity Lines/Devices:  Intact, no erythema, drainage or tenderness Psych: Alert and oriented, normal mood affect given the setting Data Review: CBC: 
Recent Labs 19 
0350 19 
0150 19 
8970 WBC 5.2 4.4 6.0 GRANS 10* 6* 8*  
MONOS 15* 8 1* ANEU 0.5* 0.5* 0.5*  
 ABL 1.8 1.9 3.2 HGB 8.0* 7.5* 6.9*  
HCT 24.1* 22.6* 20.5* PLT 33* 44* 54* BMP: 
Recent Labs 05/09/19 
0350 05/08/19 
0150 05/07/19 
2114 CREA 0.66 0.79 0.73  
BUN 9 13 11  140 141  
K 3.8 3.8 3.4*  
* 109* 109* CO2 24 24 24 AGAP 7 7 8 GLU 91 102* 98 LFTS: 
Recent Labs 05/09/19 
0350 05/08/19 
0150 05/07/19 
4498 TBILI 0.5 0.5 0.5 ALT 28 31 26 SGOT 17 24 26 AP 71 72 78 TP 6.9 6.7 6.9 ALB 3.3 3.3 3.3 Microbiology:  
 
All Micro Results Procedure Component Value Units Date/Time Remark Counter [[id number]] Collected:  05/07/19 2321 Order Status:  Completed Specimen:  Urine from Clean catch Updated:  05/09/19 9478 Special Requests: NO SPECIAL REQUESTS Culture result: NO GROWTH 1 DAY     
 CULTURE, BLOOD [[id number]] Collected:  05/07/19 2241 Order Status:  Completed Specimen:  Blood Updated:  05/08/19 1056 Special Requests: --     
  RIGHT Antecubital 
  
  Culture result: NO GROWTH AFTER 11 HOURS     
 CULTURE, BLOOD [[id number]] Collected:  05/05/19 1309 Order Status:  Completed Specimen:  Blood Updated:  05/08/19 1055 Special Requests: --     
  RIGHT Antecubital 
  
  Culture result: NO GROWTH 3 DAYS     
 CULTURE, BLOOD [[id number]] Collected:  05/05/19 1251 Order Status:  Completed Specimen:  Blood Updated:  05/08/19 1055 Special Requests: SINGLE PORT Culture result: NO GROWTH 3 DAYS Imaging:  
See HPI/EPIC Signed By: Jorge Killian NP May 9, 2019

## 2019-05-09 NOTE — PROGRESS NOTES
END OF SHIFT NOTE: 
Day 1 of 7+3 Patient's Right port accessed ( Dual Port )  Bleeding noted from incision site of medial port, after receiving Pre- med Zofran and Decadron. Using aseptic technique, area cleanse with Chlor- prep  Transparent dressing applied. Oozing still noted from port site , Vascular team notified. Patient complain of tenderness @insertion  Site. Surgicel dressing place on site. Proceed with Chemotherapy. Left EF 55%- 60 % Patient receive Idamycin 25 mg  over 15- 20 mg  And Cytarabine 205 mg  24 hour infusion, infusing@ 11.5 cc/hr  No further oozing from site. Clot formation of  surgicel dressing  noted. No complain of break- trough nausea or pain. Highest temp 99.0. Intake/Output No intake/output data recorded. Voiding: yes Catheter: no  
Drain:   
 
 
 
 
 
Stool:   None reported occurrences. Emesis:   No  occurrences. VITAL SIGNS Patient Vitals for the past 12 hrs: 
 Temp Pulse Resp BP SpO2  
05/09/19 1845 98.2 °F (36.8 °C) 84 18 153/76 96 % 05/09/19 1537 99 °F (37.2 °C) 76 18 134/55 95 % 05/09/19 1332 98.8 °F (37.1 °C) 79 18 117/55 95 % 05/09/19 0850 99 °F (37.2 °C) 87 18 138/56 96 % Pain Assessment Pain 1 Pain Scale 1: Numeric (0 - 10) (05/09/19 1746) Pain Intensity 1: 0 (05/09/19 1746) Patient Stated Pain Goal: 0 (05/07/19 1425) Ambulating Yes to bathroom and hallway with protective mask in place Additional Information:  See above Shift report given to oncoming nurse Jacques Wolfe RN  at the bedside.  
 
Damon Florence RN

## 2019-05-09 NOTE — PROGRESS NOTES
[facility] Hematology & Oncology Inpatient Hematology / Oncology Progress Note Admission Date: 2019 10:53 AM 
Reason for Admission/Hospital Course: Admission for antineoplastic chemotherapy [Z51.11] 24 Hour Events: VSS/no fever Day 5 vanc/cefe BMbx  completed Start 7+3 today- cleared by ID and afebrile x 48 hours ROS: 
Constitutional:  negative for fever, chills, weakness, malaise, fatigue. CV:  negative for chest pain, palpitations, edema. Respiratory: negative for dyspnea, cough, wheezing. GI: negative for nausea, abdominal pain, diarrhea. 10 point review of systems is otherwise negative with the exception of the elements mentioned above in the HPI. No Known Allergies OBJECTIVE: 
Patient Vitals for the past 8 hrs: 
 BP Temp Pulse Resp SpO2  
19 0850 138/56 99 °F (37.2 °C) 87 18 96 % Temp (24hrs), Av.9 °F (37.2 °C), Min:98 °F (36.7 °C), Max:100 °F (37.8 °C) 
 
 07 -  1900 In: -  
Out: 366 [IWRRT:391] Physical Exam: 
Constitutional: Well developed, well nourished female in no acute distress, sitting comfortably in the hospital bedside chair HEENT: Normocephalic and atraumatic. Oropharynx is clear, mucous membranes are moist. Extraocular muscles are intact. Sclerae anicteric. Skin Warm and dry. No rash noted. No erythema. No pallor. Right port site continues to improve - bruising improved. Bruising to upper extremities bilaterally. Respiratory Lungs are clear to auscultation bilaterally without wheezes, rales or rhonchi, normal air exchange without accessory muscle use. CVS Normal rate, regular rhythm and normal S1 and S2. No murmurs, gallops, or rubs. Abdomen Soft, nontender and nondistended, normoactive bowel sounds. Neuro Grossly nonfocal with no obvious sensory or motor deficits. MSK Normal range of motion in general.  No edema and no tenderness. Psych Appropriate mood and affect. Labs: Recent Labs 05/09/19 
0350 05/08/19 
0150 05/07/19 
3380 WBC 5.2 4.4 6.0  
RBC 2.47* 2.34* 2.06* HGB 8.0* 7.5* 6.9*  
HCT 24.1* 22.6* 20.5* MCV 97.6 96.6 99.5*  
MCH 32.4 32.1 33.5*  
MCHC 33.2 33.2 33.7 RDW 17.0* 16.7* 15.9*  
PLT 33* 44* 54* GRANS 10* 6* 8*  
LYMPH 34 43 54* MONOS 15* 8 1* DF MANUAL MANUAL MANUAL ANEU 0.5* 0.5* 0.5* ABL 1.8 1.9 3.2 ABM 0.8 0.4 0.1 Recent Labs 05/09/19 
0350 05/08/19 
0150 05/07/19 
4923  140 141  
K 3.8 3.8 3.4*  
* 109* 109* CO2 24 24 24 AGAP 7 7 8 GLU 91 102* 98  
BUN 9 13 11 CREA 0.66 0.79 0.73 GFRAA >60 >60 >60 GFRNA >60 >60 >60  
CA 8.6 8.4 8.8 SGOT 17 24 26 AP 71 72 78 TP 6.9 6.7 6.9 ALB 3.3 3.3 3.3 GLOB 3.6* 3.4 3.6* AGRAT 0.9* 1.0* 0.9* MG 2.2 2.0 2.2 PHOS 2.6 3.0 2.9 Imaging: 
 
Medications: 
Current Facility-Administered Medications Medication Dose Route Frequency  ondansetron (ZOFRAN) injection 8 mg  8 mg IntraVENous Q8H  
 dexamethasone (DECADRON) injection 10 mg  10 mg IntraVENous Q24H  
 LORazepam (ATIVAN) injection 0.5 mg  0.5 mg IntraVENous Q6H PRN  
 cytarabine (CYTOSAR) 205 mg in 0.9% sodium chloride 250 mL chemo infusion  100 mg/m2 (Treatment Plan Recorded) IntraVENous Q24H  
 IDArubicin (IDAMYCIN) 25 mg in dextrose 5% 50 mL chemo infusion  12 mg/m2 (Treatment Plan Recorded) IntraVENous Q24H  
 vancomycin (VANCOCIN) 1250 mg in  ml infusion  1,250 mg IntraVENous Q12H  
 acetaminophen (TYLENOL) tablet 650 mg  650 mg Oral PRN  
 diphenhydrAMINE (BENADRYL) capsule 25 mg  25 mg Oral PRN  potassium chloride (K-DUR, KLOR-CON) SR tablet 20 mEq  20 mEq Oral BID  
 0.9% sodium chloride infusion 250 mL  250 mL IntraVENous PRN  
 acetaminophen/diphenhydrAMINE (TYLENOL PM EXT STR) 500/25 mg   Oral QHS  acyclovir (ZOVIRAX) tablet 400 mg  400 mg Oral BID  allopurinol (ZYLOPRIM) tablet 300 mg  300 mg Oral DAILY  calcium carbonate (OS-CHRIS) tablet 500 mg [elemental]  500 mg Oral TID WITH MEALS  escitalopram oxalate (LEXAPRO) tablet 10 mg  10 mg Oral DAILY  fluconazole (DIFLUCAN) tablet 200 mg  200 mg Oral DAILY  lidocaine-prilocaine (EMLA) 2.5-2.5 % cream   Topical PRN  
 LORazepam (ATIVAN) tablet 1 mg  1 mg Oral QHS  ondansetron (ZOFRAN ODT) tablet 8 mg  8 mg Oral Q8H PRN  pravastatin (PRAVACHOL) tablet 10 mg  10 mg Oral QHS  vit C-E-zinc cit-lutein-zeaxan 50 mg-15 unit- 4.5 mg-2.5 mg chew (OCU-ABDOUL) 1 Tab (Patient Supplied)  1 Tab Oral DAILY  cefepime (MAXIPIME) 2 g in 0.9% sodium chloride (MBP/ADV) 100 mL  2 g IntraVENous Q12H  ergocalciferol capsule 50,000 Units  50,000 Units Oral every Wednesday ASSESSMENT: 
 
Problem List  Date Reviewed: 4/30/2019 Codes Class Noted Acute myeloid leukemia not having achieved remission ([facility] ICD-10-CM: C92.00 ICD-9-CM: 205.00  5/9/2019 Admission for antineoplastic chemotherapy ICD-10-CM: Z51.11 ICD-9-CM: V58.11  5/5/2019 AML (acute myeloblastic leukemia) ([facility].) ICD-10-CM: C92.00 ICD-9-CM: 205.00  4/28/2019 Weakness generalized ICD-10-CM: R53.1 ICD-9-CM: 780.79  4/28/2019 Pancytopenia ([facility] ICD-10-CM: N24.558 ICD-9-CM: 284.19  4/28/2019 Thrombocytopenia ([facility] ICD-10-CM: D69.6 ICD-9-CM: 287.5  4/27/2019 PLAN: 
AML FLT 3+ 
5/6  BMbx planned for Tuesday then wait for [facility] from port results to determine start date of  cycle one 7 + 3 with Midostaurin day 8-21. Platelets will need to be at least 50k for bx tomorrow 5/7. BMbx today-platelets prior. Also needed prbc today for hgb 6.9. 
5/08 Start 7+3 when afebrile per Dr. John Lopez. 5/9 Day 1 of 7+3. Monitor TLS labs 
 
  
Possible port infection 5/5 Day one vanc/cefe. Follow cultures. Do not use port. May need to have Echo if cultures positive. 5/6 BCNTD. Appears improved today. Continue to monitor. No fevers or other symptoms. 5/7 Day 3 vanc/cefe. Site appears even better today. 5/8 Does not appear infected. Site bruised. 5/9 BCx negative. Per ID okay to start treatment. Neutropenic Fever 05/05 Pan-culture negative. On Vancomycin, Maxipime 05/08 Febrile overnight up to 102.5. Repeat cultures x 1. Continue antibiotics. Likely disease related. Consult ID for clearance. 05/09 BC remain negative. No fevers 48 hours. Continue vanc/cefe 
  
Alexander SOPs Supportive care ACV/Diflucan 
LVQ dc'd while on cefe and vanc Goals and plan of care reviewed with the patient. All questions answered to the best of our ability. Merrill George NP [facility] Hematology & Oncology [address] [place] [address] Office : [phone] Fax : [phone] I personally saw, exammed and counselled the patient, and discussed with NP, agree with above history/assessment/plan. 73 y. o.female new dx of AML FLT3+ transferred to [facility] for rx, however port was red and swollen and started vanc/cefepime for cellulitis/port infection, will follow blood culture to determine whether need to remove port, desired to salvage given prior PICC placement failure, midostaurin ordered and marrow biopsy planned to complete study. Port removal was scheduled and Dr. Hernan Monique discussed with me for lack of sign for active port infection and placing any other line would likely cause similar bruising, discussed with Dr. Nura Zamora and decided to keep port and continue antibiotics now, prepare to start induction. Fever 5/8/19 of no clear reason, repeat cultures but concern leukemia fever, start 7+3 induction today, appreciate ID input, educated for toxicities and management, supportive care Perry Walker M.D. [address] Office : [phone] Fax : [phone]

## 2019-05-10 LAB
ALBUMIN SERPL-MCNC: 3.3 G/DL (ref 3.2–4.6)
ALBUMIN/GLOB SERPL: 0.8 {RATIO} (ref 1.2–3.5)
ALP SERPL-CCNC: 70 U/L (ref 50–136)
ALT SERPL-CCNC: 28 U/L (ref 12–65)
ANION GAP SERPL CALC-SCNC: 6 MMOL/L (ref 7–16)
AST SERPL-CCNC: 17 U/L (ref 15–37)
BACTERIA SPEC CULT: NORMAL
BASOPHILS # BLD: 0 K/UL (ref 0–0.2)
BASOPHILS NFR BLD: 0 % (ref 0–2)
BILIRUB SERPL-MCNC: 0.3 MG/DL (ref 0.2–1.1)
BUN SERPL-MCNC: 13 MG/DL (ref 8–23)
CALCIUM SERPL-MCNC: 8.7 MG/DL (ref 8.3–10.4)
CHLORIDE SERPL-SCNC: 110 MMOL/L (ref 98–107)
CO2 SERPL-SCNC: 25 MMOL/L (ref 21–32)
CREAT SERPL-MCNC: 0.76 MG/DL (ref 0.6–1)
DIFFERENTIAL METHOD BLD: ABNORMAL
EOSINOPHIL # BLD: 0 K/UL (ref 0–0.8)
EOSINOPHIL NFR BLD: 0 % (ref 0.5–7.8)
ERYTHROCYTE [DISTWIDTH] IN BLOOD BY AUTOMATED COUNT: 16.4 % (ref 11.9–14.6)
GLOBULIN SER CALC-MCNC: 3.9 G/DL (ref 2.3–3.5)
GLUCOSE SERPL-MCNC: 152 MG/DL (ref 65–100)
HCT VFR BLD AUTO: 23.6 % (ref 35.8–46.3)
HGB BLD-MCNC: 7.8 G/DL (ref 11.7–15.4)
IMM GRANULOCYTES # BLD AUTO: 0.1 K/UL (ref 0–0.5)
IMM GRANULOCYTES NFR BLD AUTO: 1 % (ref 0–5)
LDH SERPL L TO P-CCNC: 332 U/L (ref 110–210)
LYMPHOCYTES # BLD: 1 K/UL (ref 0.5–4.6)
LYMPHOCYTES NFR BLD: 20 % (ref 13–44)
MAGNESIUM SERPL-MCNC: 2.4 MG/DL (ref 1.8–2.4)
MCH RBC QN AUTO: 32.1 PG (ref 26.1–32.9)
MCHC RBC AUTO-ENTMCNC: 33.1 G/DL (ref 31.4–35)
MCV RBC AUTO: 97.1 FL (ref 79.6–97.8)
MONOCYTES # BLD: 3.8 K/UL (ref 0.1–1.3)
MONOCYTES NFR BLD: 73 % (ref 4–12)
NEUTS SEG # BLD: 0.3 K/UL (ref 1.7–8.2)
NEUTS SEG NFR BLD: 6 % (ref 43–78)
NRBC # BLD: 0 K/UL (ref 0–0.2)
PHOSPHATE SERPL-MCNC: 3.2 MG/DL (ref 2.3–3.7)
PLATELET # BLD AUTO: 24 K/UL (ref 150–450)
PLATELET COMMENTS,PCOM: ABNORMAL
PMV BLD AUTO: 10.2 FL (ref 9.4–12.3)
POTASSIUM SERPL-SCNC: 4.8 MMOL/L (ref 3.5–5.1)
PROT SERPL-MCNC: 7.2 G/DL (ref 6.3–8.2)
RBC # BLD AUTO: 2.43 M/UL (ref 4.05–5.2)
RBC MORPH BLD: ABNORMAL
SERVICE CMNT-IMP: NORMAL
SODIUM SERPL-SCNC: 141 MMOL/L (ref 136–145)
URATE SERPL-MCNC: 3.4 MG/DL (ref 2.6–6)
WBC # BLD AUTO: 5.2 K/UL (ref 4.3–11.1)
WBC MORPH BLD: ABNORMAL

## 2019-05-10 PROCEDURE — 74011000258 HC RX REV CODE- 258: Performed by: INTERNAL MEDICINE

## 2019-05-10 PROCEDURE — 74011250636 HC RX REV CODE- 250/636: Performed by: NURSE PRACTITIONER

## 2019-05-10 PROCEDURE — 65270000029 HC RM PRIVATE

## 2019-05-10 PROCEDURE — 77030014007 HC SPNG HEMSTAT J&J -B

## 2019-05-10 PROCEDURE — 99232 SBSQ HOSP IP/OBS MODERATE 35: CPT | Performed by: INTERNAL MEDICINE

## 2019-05-10 PROCEDURE — 86644 CMV ANTIBODY: CPT

## 2019-05-10 PROCEDURE — 74011250636 HC RX REV CODE- 250/636: Performed by: INTERNAL MEDICINE

## 2019-05-10 PROCEDURE — 83615 LACTATE (LD) (LDH) ENZYME: CPT

## 2019-05-10 PROCEDURE — 83735 ASSAY OF MAGNESIUM: CPT

## 2019-05-10 PROCEDURE — 77030039270 HC TU BLD FLTR CARD -A

## 2019-05-10 PROCEDURE — 74011250637 HC RX REV CODE- 250/637: Performed by: NURSE PRACTITIONER

## 2019-05-10 PROCEDURE — 74011000258 HC RX REV CODE- 258: Performed by: NURSE PRACTITIONER

## 2019-05-10 PROCEDURE — 36591 DRAW BLOOD OFF VENOUS DEVICE: CPT

## 2019-05-10 PROCEDURE — 74011250637 HC RX REV CODE- 250/637: Performed by: INTERNAL MEDICINE

## 2019-05-10 PROCEDURE — 84100 ASSAY OF PHOSPHORUS: CPT

## 2019-05-10 PROCEDURE — 85025 COMPLETE CBC W/AUTO DIFF WBC: CPT

## 2019-05-10 PROCEDURE — P9037 PLATE PHERES LEUKOREDU IRRAD: HCPCS

## 2019-05-10 PROCEDURE — 80053 COMPREHEN METABOLIC PANEL: CPT

## 2019-05-10 PROCEDURE — 84550 ASSAY OF BLOOD/URIC ACID: CPT

## 2019-05-10 PROCEDURE — 36430 TRANSFUSION BLD/BLD COMPNT: CPT

## 2019-05-10 RX ORDER — SODIUM CHLORIDE 9 MG/ML
250 INJECTION, SOLUTION INTRAVENOUS AS NEEDED
Status: DISCONTINUED | OUTPATIENT
Start: 2019-05-10 | End: 2019-05-12 | Stop reason: SDUPTHER

## 2019-05-10 RX ORDER — LEVOFLOXACIN 500 MG/1
500 TABLET, FILM COATED ORAL
Status: DISCONTINUED | OUTPATIENT
Start: 2019-05-10 | End: 2019-05-18

## 2019-05-10 RX ADMIN — POTASSIUM CHLORIDE 20 MEQ: 20 TABLET, EXTENDED RELEASE ORAL at 08:50

## 2019-05-10 RX ADMIN — ESCITALOPRAM OXALATE 10 MG: 10 TABLET ORAL at 08:50

## 2019-05-10 RX ADMIN — ACETAMINOPHEN: 500 TABLET, FILM COATED ORAL at 21:33

## 2019-05-10 RX ADMIN — PRAVASTATIN SODIUM 10 MG: 20 TABLET ORAL at 21:34

## 2019-05-10 RX ADMIN — ACETAMINOPHEN 650 MG: 325 TABLET, FILM COATED ORAL at 16:17

## 2019-05-10 RX ADMIN — DEXAMETHASONE SODIUM PHOSPHATE 10 MG: 10 INJECTION INTRAMUSCULAR; INTRAVENOUS at 12:00

## 2019-05-10 RX ADMIN — ONDANSETRON 8 MG: 2 INJECTION INTRAMUSCULAR; INTRAVENOUS at 21:34

## 2019-05-10 RX ADMIN — ACYCLOVIR 400 MG: 800 TABLET ORAL at 08:49

## 2019-05-10 RX ADMIN — DEXAMETHASONE SODIUM PHOSPHATE 10 MG: 10 INJECTION INTRAMUSCULAR; INTRAVENOUS at 14:27

## 2019-05-10 RX ADMIN — VANCOMYCIN HYDROCHLORIDE 1250 MG: 10 INJECTION, POWDER, LYOPHILIZED, FOR SOLUTION INTRAVENOUS at 02:30

## 2019-05-10 RX ADMIN — SODIUM CHLORIDE 250 ML: 900 INJECTION, SOLUTION INTRAVENOUS at 16:18

## 2019-05-10 RX ADMIN — FLUCONAZOLE 200 MG: 100 TABLET ORAL at 08:50

## 2019-05-10 RX ADMIN — ACYCLOVIR 400 MG: 800 TABLET ORAL at 17:41

## 2019-05-10 RX ADMIN — LORAZEPAM 1 MG: 1 TABLET ORAL at 21:33

## 2019-05-10 RX ADMIN — LEVOFLOXACIN 500 MG: 500 TABLET, FILM COATED ORAL at 12:23

## 2019-05-10 RX ADMIN — ONDANSETRON 8 MG: 2 INJECTION INTRAMUSCULAR; INTRAVENOUS at 14:28

## 2019-05-10 RX ADMIN — Medication 500 MG: at 08:50

## 2019-05-10 RX ADMIN — DIPHENHYDRAMINE HYDROCHLORIDE 25 MG: 25 CAPSULE ORAL at 16:18

## 2019-05-10 RX ADMIN — VANCOMYCIN HYDROCHLORIDE 1250 MG: 10 INJECTION, POWDER, LYOPHILIZED, FOR SOLUTION INTRAVENOUS at 14:37

## 2019-05-10 RX ADMIN — Medication 500 MG: at 12:23

## 2019-05-10 RX ADMIN — ALLOPURINOL 300 MG: 300 TABLET ORAL at 08:50

## 2019-05-10 RX ADMIN — CEFEPIME HYDROCHLORIDE 2 G: 2 INJECTION, POWDER, FOR SOLUTION INTRAVENOUS at 00:42

## 2019-05-10 RX ADMIN — CYTARABINE 205 MG: 100 INJECTION, SOLUTION INTRATHECAL; INTRAVENOUS; SUBCUTANEOUS at 16:46

## 2019-05-10 RX ADMIN — ONDANSETRON 8 MG: 2 INJECTION INTRAMUSCULAR; INTRAVENOUS at 04:01

## 2019-05-10 RX ADMIN — DEXTROSE MONOHYDRATE 25 MG: 5 INJECTION, SOLUTION INTRAVENOUS at 16:14

## 2019-05-10 RX ADMIN — POTASSIUM CHLORIDE 20 MEQ: 20 TABLET, EXTENDED RELEASE ORAL at 17:41

## 2019-05-10 RX ADMIN — CEFEPIME HYDROCHLORIDE 2 G: 2 INJECTION, POWDER, FOR SOLUTION INTRAVENOUS at 12:25

## 2019-05-10 RX ADMIN — Medication 500 MG: at 17:41

## 2019-05-10 NOTE — PROGRESS NOTES
Infectious Disease Note Today's Date: 5/10/2019 Admit Date: 2019 Impression: · Fever in setting of newly diagnosed AML, started induction chemotherapy on 19 · S/p PORT placement () Plan:  
· Continue Vancomycin and Cefepime through 19 and then I will transition back to levofloxacin 500 mg po daily Anti-infectives: · LVQ (-) · Vanc/Cefepime (- Subjective: She had some bleeding from the port site yesterday. Afebrile. No Known Allergies Review of Systems:  A comprehensive review of systems was negative except for that written in the History of Present Illness. Objective:  
 
Visit Vitals /57 (BP 1 Location: Right arm, BP Patient Position: At rest) Pulse 66 Temp 98.8 °F (37.1 °C) Resp 18 Ht 5' 6\" (1.676 m) Wt 90.3 kg (199 lb) SpO2 (!) 18% Breastfeeding? No  
BMI 32.12 kg/m² Temp (24hrs), Av.4 °F (36.9 °C), Min:97.8 °F (36.6 °C), Max:99 °F (37.2 °C) Lines:  Peripheral IV:   R chest PORT is accessed. Physical Exam:   
General:  Alert, cooperative, well noursished, well developed, appears stated age Eyes:  Sclera anicteric. Pupils equally round and reactive to light. Mouth/Throat: Mucous membranes normal, oral pharynx clear Neck: Supple Lungs:   Clear to auscultation bilaterally, good effort, RA   
CV:  Regular rate and rhythm,no murmur, click, rub or gallop Abdomen:   Soft, non-tender. bowel sounds normal. non-distended Extremities: No cyanosis. Trace edema Skin: Skin color, texture, turgor normal. Right IJ port is accessed, nontender, no signs of cellulitis Lymph nodes: Cervical and supraclavicular normal  
Musculoskeletal: No swelling or deformity Lines/Devices:  Intact, no erythema, drainage or tenderness Psych: Alert and oriented, normal mood affect given the setting Data Review: CBC: 
Recent Labs 05/10/19 
0856 19 
0350 19 
0150 WBC 5.2 5.2 4.4 GRANS 6* 10* 6*  
 MONOS 73* 15* 8  
EOS 0*  --   --   
ANEU 0.3* 0.5* 0.5* ABL 1.0 1.8 1.9 HGB 7.8* 8.0* 7.5* HCT 23.6* 24.1* 22.6*  
PLT 24* 33* 44* BMP: 
Recent Labs 05/10/19 
4642 05/09/19 
0350 05/08/19 
0150 CREA 0.76 0.66 0.79 BUN 13 9 13  141 140  
K 4.8 3.8 3.8 * 110* 109* CO2 25 24 24 AGAP 6* 7 7 * 91 102* LFTS: 
Recent Labs 05/10/19 
7933 05/09/19 
0350 05/08/19 
0150 TBILI 0.3 0.5 0.5 ALT 28 28 31 SGOT 17 17 24 AP 70 71 72 TP 7.2 6.9 6.7 ALB 3.3 3.3 3.3 Microbiology:  
 
All Micro Results Procedure Component Value Units Date/Time CULTURE, BLOOD [[id number]] Collected:  05/07/19 2241 Order Status:  Completed Specimen:  Blood Updated:  05/10/19 6429 Special Requests: --     
  RIGHT Antecubital 
  
  Culture result: NO GROWTH 3 DAYS     
 CULTURE, BLOOD [[id number]] Collected:  05/05/19 1251 Order Status:  Completed Specimen:  Blood Updated:  05/10/19 0685 Special Requests: SINGLE PORT Culture result: NO GROWTH 5 DAYS     
 CULTURE, BLOOD [[id number]] Collected:  05/05/19 1309 Order Status:  Completed Specimen:  Blood Updated:  05/10/19 8927 Special Requests: --     
  RIGHT Antecubital 
  
  Culture result: NO GROWTH 5 DAYS     
 CULTURE, URINE [[id number]] Collected:  05/07/19 2321 Order Status:  Completed Specimen:  Urine from Clean catch Updated:  05/10/19 6554 Special Requests: NO SPECIAL REQUESTS Culture result: NO GROWTH 2 DAYS Imaging:  
See HPI/EPIC Signed By: Michel Beckman MD   
 May 10, 2019

## 2019-05-10 NOTE — PROGRESS NOTES
[facility] Hematology & Oncology Inpatient Hematology / Oncology Progress Note Admission Date: 2019 10:53 AM 
Reason for Admission/Hospital Course: Admission for antineoplastic chemotherapy [Z51.11] 24 Hour Events: VSS/afebrile Day 6 vanc/cefe, plan to complete  and go to Levaquin prophylaxis BMbx  completed Day 2 7+3 No new complaints ROS: 
Constitutional:  negative for fever, chills, weakness, malaise, fatigue. CV:  negative for chest pain, palpitations, edema. Respiratory: negative for dyspnea, cough, wheezing. GI: negative for nausea, abdominal pain, diarrhea. 10 point review of systems is otherwise negative with the exception of the elements mentioned above in the HPI. No Known Allergies OBJECTIVE: 
Patient Vitals for the past 8 hrs: 
 BP Temp Pulse Resp SpO2  
05/10/19 0800 141/57 98.8 °F (37.1 °C) 66 18 (!) 18 % 05/10/19 0332 146/68 97.9 °F (36.6 °C) 75 18 93 % Temp (24hrs), Av.4 °F (36.9 °C), Min:97.8 °F (36.6 °C), Max:99 °F (37.2 °C) 
 
05/10 0701 - 05/10 1900 In: 360 [P.O.:360] Out: 250 [Urine:250] Physical Exam: 
Constitutional: Well developed, well nourished female in no acute distress, sitting comfortably in the hospital bedside chair HEENT: Normocephalic and atraumatic. Oropharynx is clear, mucous membranes are moist. Extraocular muscles are intact. Sclerae anicteric. Skin Warm and dry. No rash noted. No erythema. No pallor. Right port site continues to improve. Bruising to upper extremities bilaterally. Respiratory Lungs are clear to auscultation bilaterally without wheezes, rales or rhonchi, normal air exchange without accessory muscle use. CVS Normal rate, regular rhythm and normal S1 and S2. No murmurs, gallops, or rubs. Abdomen Soft, nontender and nondistended, normoactive bowel sounds. Neuro Grossly nonfocal with no obvious sensory or motor deficits. MSK Normal range of motion in general.  No edema and no tenderness. Psych Appropriate mood and affect. Labs: 
   
Recent Labs 05/10/19 
9320 05/09/19 
0350 05/08/19 
0150 WBC 5.2 5.2 4.4  
RBC 2.43* 2.47* 2.34* HGB 7.8* 8.0* 7.5* HCT 23.6* 24.1* 22.6* MCV 97.1 97.6 96.6 MCH 32.1 32.4 32.1 MCHC 33.1 33.2 33.2 RDW 16.4* 17.0* 16.7*  
PLT 24* 33* 44* GRANS 6* 10* 6*  
LYMPH 20 34 43 MONOS 73* 15* 8  
EOS 0*  --   --   
BASOS 0  --   --   
IG 1  --   --   
DF AUTOMATED MANUAL MANUAL ANEU 0.3* 0.5* 0.5* ABL 1.0 1.8 1.9 ABM 3.8* 0.8 0.4 ALEKSANDR 0.0  --   --   
ABB 0.0  --   --   
AIG 0.1  --   --   
 
  
Recent Labs 05/10/19 
5446 05/09/19 
0350 05/08/19 
0150  141 140  
K 4.8 3.8 3.8 * 110* 109* CO2 25 24 24 AGAP 6* 7 7 * 91 102* BUN 13 9 13 CREA 0.76 0.66 0.79 GFRAA >60 >60 >60 GFRNA >60 >60 >60  
CA 8.7 8.6 8.4 SGOT 17 17 24 AP 70 71 72 TP 7.2 6.9 6.7 ALB 3.3 3.3 3.3 GLOB 3.9* 3.6* 3.4 AGRAT 0.8* 0.9* 1.0*  
MG 2.4 2.2 2.0 PHOS 3.2 2.6 3.0 Imaging: 
 
Medications: 
Current Facility-Administered Medications Medication Dose Route Frequency  ondansetron (ZOFRAN) injection 8 mg  8 mg IntraVENous Q8H  
 dexamethasone (DECADRON) injection 10 mg  10 mg IntraVENous Q24H  
 LORazepam (ATIVAN) injection 0.5 mg  0.5 mg IntraVENous Q6H PRN  
 cytarabine (CYTOSAR) 205 mg in 0.9% sodium chloride 250 mL chemo infusion  100 mg/m2 (Treatment Plan Recorded) IntraVENous Q24H  
 IDArubicin (IDAMYCIN) 25 mg in dextrose 5% 50 mL chemo infusion  12 mg/m2 (Treatment Plan Recorded) IntraVENous Q24H  
 vancomycin (VANCOCIN) 1250 mg in  ml infusion  1,250 mg IntraVENous Q12H  
 acetaminophen (TYLENOL) tablet 650 mg  650 mg Oral PRN  
 diphenhydrAMINE (BENADRYL) capsule 25 mg  25 mg Oral PRN  potassium chloride (K-DUR, KLOR-CON) SR tablet 20 mEq  20 mEq Oral BID  
 0.9% sodium chloride infusion 250 mL  250 mL IntraVENous PRN  
  acetaminophen/diphenhydrAMINE (TYLENOL PM EXT STR) 500/25 mg   Oral QHS  acyclovir (ZOVIRAX) tablet 400 mg  400 mg Oral BID  allopurinol (ZYLOPRIM) tablet 300 mg  300 mg Oral DAILY  calcium carbonate (OS-CHRIS) tablet 500 mg [elemental]  500 mg Oral TID WITH MEALS  escitalopram oxalate (LEXAPRO) tablet 10 mg  10 mg Oral DAILY  fluconazole (DIFLUCAN) tablet 200 mg  200 mg Oral DAILY  lidocaine-prilocaine (EMLA) 2.5-2.5 % cream   Topical PRN  
 LORazepam (ATIVAN) tablet 1 mg  1 mg Oral QHS  ondansetron (ZOFRAN ODT) tablet 8 mg  8 mg Oral Q8H PRN  pravastatin (PRAVACHOL) tablet 10 mg  10 mg Oral QHS  vit C-E-zinc cit-lutein-zeaxan 50 mg-15 unit- 4.5 mg-2.5 mg chew (OCU-ABDOUL) 1 Tab (Patient Supplied)  1 Tab Oral DAILY  cefepime (MAXIPIME) 2 g in 0.9% sodium chloride (MBP/ADV) 100 mL  2 g IntraVENous Q12H  ergocalciferol capsule 50,000 Units  50,000 Units Oral every Wednesday ASSESSMENT: 
 
Problem List  Date Reviewed: 4/30/2019 Codes Class Noted Acute myeloid leukemia not having achieved remission ([facility].) ICD-10-CM: C92.00 ICD-9-CM: 205.00  5/9/2019 Admission for antineoplastic chemotherapy ICD-10-CM: Z51.11 ICD-9-CM: V58.11  5/5/2019 AML (acute myeloblastic leukemia) ([facility].) ICD-10-CM: C92.00 ICD-9-CM: 205.00  4/28/2019 Weakness generalized ICD-10-CM: R53.1 ICD-9-CM: 780.79  4/28/2019 Pancytopenia ([facility] ICD-10-CM: C47.343 ICD-9-CM: 284.19  4/28/2019 Thrombocytopenia ([facility] ICD-10-CM: D69.6 ICD-9-CM: 287.5  4/27/2019 PLAN: 
AML FLT 3+ 
5/6  BMbx planned for Tuesday then wait for [facility] from port results to determine start date of  cycle one 7 + 3 with Midostaurin day 8-21. Platelets will need to be at least 50k for bx tomorrow 5/7. BMbx today-platelets prior. Also needed prbc today for hgb 6.9. 
5/08 Start 7+3 when afebrile per Dr. Benton Boxer. 5/09 Day 1 of 7+3. Monitor TLS labs. 5/10 Day 2 7+3.  Tolerating well.  
 
  
 Possible port infection 5/5 Day one vanc/cefe. Follow cultures. Do not use port. May need to have Echo if cultures positive. 5/6 BCNTD. Appears improved today. Continue to monitor. No fevers or other symptoms. 5/7 Day 3 vanc/cefe. Site appears even better today. 5/8 Does not appear infected. Site bruised. 5/9 BCx negative. Per ID okay to start treatment. 5/10 Continue Vanc/cef through tomorrow and stop. Levaquin prophylaxis at that time. Neutropenic Fever 05/05 Pan-culture negative. On Vancomycin, Maxipime 05/08 Febrile overnight up to 102.5. Repeat cultures x 1. Continue antibiotics. Likely disease related. Consult ID for clearance. 05/09 BC remain negative. No fevers 48 hours. Continue vanc/cefe. 5/10 Continue Vanc/cef through tomorrow and stop. Levaquin prophylaxis at that time. [facility] SOPs Supportive care ACV/Diflucan 
LVQ dc'd while on cefe and vanc Goals and plan of care reviewed with the patient. All questions answered to the best of our ability. Eli Yoo NP [facility] Hematology & Oncology [address] [place] [address] Office : [phone] Fax : [phone] I personally saw, exammed and counselled the patient, and discussed with NP, agree with above history/assessment/plan. 73 y. o.female new dx of AML FLT3+ transferred to [facility] for rx, however port was red and swollen and started vanc/cefepime for cellulitis/port infection, will follow blood culture to determine whether need to remove port, desired to salvage given prior PICC placement failure, midostaurin ordered and marrow biopsy planned to complete study.  Port removal was scheduled and Dr. Virgen Mathews discussed with me for lack of sign for active port infection and placing any other line would likely cause similar bruising, discussed with Dr. Carolina Swan and decided to keep port and continue antibiotics now, prepare to start induction. Fever 5/8/19 of no clear reason, repeat cultures but concern leukemia fever, started 7+3 induction 5/10/19, tolerated well and proceed to day 2, supportive care Patrina Simmonds, M.D. [address] Office : [phone] Fax : [phone]

## 2019-05-11 LAB
ALBUMIN SERPL-MCNC: 3.4 G/DL (ref 3.2–4.6)
ALBUMIN/GLOB SERPL: 0.9 {RATIO} (ref 1.2–3.5)
ALP SERPL-CCNC: 66 U/L (ref 50–136)
ALT SERPL-CCNC: 25 U/L (ref 12–65)
ANION GAP SERPL CALC-SCNC: 6 MMOL/L (ref 7–16)
AST SERPL-CCNC: 14 U/L (ref 15–37)
BASOPHILS # BLD: 0 K/UL (ref 0–0.2)
BASOPHILS NFR BLD: 0 % (ref 0–2)
BILIRUB SERPL-MCNC: 0.3 MG/DL (ref 0.2–1.1)
BLD PROD TYP BPU: NORMAL
BPU ID: NORMAL
BUN SERPL-MCNC: 22 MG/DL (ref 8–23)
CALCIUM SERPL-MCNC: 9 MG/DL (ref 8.3–10.4)
CHLORIDE SERPL-SCNC: 109 MMOL/L (ref 98–107)
CO2 SERPL-SCNC: 24 MMOL/L (ref 21–32)
CREAT SERPL-MCNC: 0.78 MG/DL (ref 0.6–1)
DIFFERENTIAL METHOD BLD: ABNORMAL
EOSINOPHIL # BLD: 0 K/UL (ref 0–0.8)
EOSINOPHIL NFR BLD: 0 % (ref 0.5–7.8)
ERYTHROCYTE [DISTWIDTH] IN BLOOD BY AUTOMATED COUNT: 17 % (ref 11.9–14.6)
GLOBULIN SER CALC-MCNC: 3.8 G/DL (ref 2.3–3.5)
GLUCOSE SERPL-MCNC: 155 MG/DL (ref 65–100)
HCT VFR BLD AUTO: 22.2 % (ref 35.8–46.3)
HGB BLD-MCNC: 7.2 G/DL (ref 11.7–15.4)
IMM GRANULOCYTES # BLD AUTO: 0 K/UL (ref 0–0.5)
IMM GRANULOCYTES NFR BLD AUTO: 0 % (ref 0–5)
LDH SERPL L TO P-CCNC: 313 U/L (ref 110–210)
LYMPHOCYTES # BLD: 1.6 K/UL (ref 0.5–4.6)
LYMPHOCYTES NFR BLD: 24 % (ref 13–44)
MAGNESIUM SERPL-MCNC: 2.5 MG/DL (ref 1.8–2.4)
MCH RBC QN AUTO: 31.7 PG (ref 26.1–32.9)
MCHC RBC AUTO-ENTMCNC: 32.4 G/DL (ref 31.4–35)
MCV RBC AUTO: 97.8 FL (ref 79.6–97.8)
MONOCYTES # BLD: 4.7 K/UL (ref 0.1–1.3)
MONOCYTES NFR BLD: 71 % (ref 4–12)
NEUTS SEG # BLD: 0.3 K/UL (ref 1.7–8.2)
NEUTS SEG NFR BLD: 5 % (ref 43–78)
NRBC # BLD: 0 K/UL (ref 0–0.2)
PHOSPHATE SERPL-MCNC: 4.7 MG/DL (ref 2.3–3.7)
PLATELET # BLD AUTO: 57 K/UL (ref 150–450)
PLATELET COMMENTS,PCOM: ABNORMAL
PMV BLD AUTO: 10.3 FL (ref 9.4–12.3)
POTASSIUM SERPL-SCNC: 5 MMOL/L (ref 3.5–5.1)
PROT SERPL-MCNC: 7.2 G/DL (ref 6.3–8.2)
RBC # BLD AUTO: 2.27 M/UL (ref 4.05–5.2)
RBC MORPH BLD: ABNORMAL
SODIUM SERPL-SCNC: 139 MMOL/L (ref 136–145)
STATUS OF UNIT,%ST: NORMAL
UNIT DIVISION, %UDIV: 0
URATE SERPL-MCNC: 4.5 MG/DL (ref 2.6–6)
WBC # BLD AUTO: 6.6 K/UL (ref 4.3–11.1)
WBC MORPH BLD: ABNORMAL

## 2019-05-11 PROCEDURE — 36591 DRAW BLOOD OFF VENOUS DEVICE: CPT

## 2019-05-11 PROCEDURE — 74011000258 HC RX REV CODE- 258: Performed by: INTERNAL MEDICINE

## 2019-05-11 PROCEDURE — 65270000029 HC RM PRIVATE

## 2019-05-11 PROCEDURE — 74011250637 HC RX REV CODE- 250/637: Performed by: NURSE PRACTITIONER

## 2019-05-11 PROCEDURE — 83735 ASSAY OF MAGNESIUM: CPT

## 2019-05-11 PROCEDURE — 80053 COMPREHEN METABOLIC PANEL: CPT

## 2019-05-11 PROCEDURE — 74011250636 HC RX REV CODE- 250/636: Performed by: INTERNAL MEDICINE

## 2019-05-11 PROCEDURE — 74011250637 HC RX REV CODE- 250/637: Performed by: INTERNAL MEDICINE

## 2019-05-11 PROCEDURE — 83615 LACTATE (LD) (LDH) ENZYME: CPT

## 2019-05-11 PROCEDURE — 99232 SBSQ HOSP IP/OBS MODERATE 35: CPT | Performed by: INTERNAL MEDICINE

## 2019-05-11 PROCEDURE — 84100 ASSAY OF PHOSPHORUS: CPT

## 2019-05-11 PROCEDURE — 84550 ASSAY OF BLOOD/URIC ACID: CPT

## 2019-05-11 PROCEDURE — 85025 COMPLETE CBC W/AUTO DIFF WBC: CPT

## 2019-05-11 RX ADMIN — CEFEPIME HYDROCHLORIDE 2 G: 2 INJECTION, POWDER, FOR SOLUTION INTRAVENOUS at 01:22

## 2019-05-11 RX ADMIN — DEXTROSE MONOHYDRATE 25 MG: 5 INJECTION, SOLUTION INTRAVENOUS at 15:28

## 2019-05-11 RX ADMIN — ALLOPURINOL 300 MG: 300 TABLET ORAL at 08:09

## 2019-05-11 RX ADMIN — PRAVASTATIN SODIUM 10 MG: 20 TABLET ORAL at 21:45

## 2019-05-11 RX ADMIN — ONDANSETRON 8 MG: 2 INJECTION INTRAMUSCULAR; INTRAVENOUS at 21:48

## 2019-05-11 RX ADMIN — DEXAMETHASONE SODIUM PHOSPHATE 10 MG: 10 INJECTION INTRAMUSCULAR; INTRAVENOUS at 15:27

## 2019-05-11 RX ADMIN — VANCOMYCIN HYDROCHLORIDE 1250 MG: 10 INJECTION, POWDER, LYOPHILIZED, FOR SOLUTION INTRAVENOUS at 02:29

## 2019-05-11 RX ADMIN — POTASSIUM CHLORIDE 20 MEQ: 20 TABLET, EXTENDED RELEASE ORAL at 16:58

## 2019-05-11 RX ADMIN — ACYCLOVIR 400 MG: 800 TABLET ORAL at 16:58

## 2019-05-11 RX ADMIN — ACYCLOVIR 400 MG: 800 TABLET ORAL at 08:08

## 2019-05-11 RX ADMIN — Medication 500 MG: at 16:58

## 2019-05-11 RX ADMIN — ESCITALOPRAM OXALATE 10 MG: 10 TABLET ORAL at 08:08

## 2019-05-11 RX ADMIN — FLUCONAZOLE 200 MG: 100 TABLET ORAL at 08:08

## 2019-05-11 RX ADMIN — ACETAMINOPHEN: 500 TABLET, FILM COATED ORAL at 21:44

## 2019-05-11 RX ADMIN — ONDANSETRON 8 MG: 2 INJECTION INTRAMUSCULAR; INTRAVENOUS at 05:14

## 2019-05-11 RX ADMIN — LORAZEPAM 1 MG: 1 TABLET ORAL at 21:44

## 2019-05-11 RX ADMIN — Medication 500 MG: at 08:08

## 2019-05-11 RX ADMIN — POTASSIUM CHLORIDE 20 MEQ: 20 TABLET, EXTENDED RELEASE ORAL at 08:08

## 2019-05-11 RX ADMIN — CYTARABINE 205 MG: 100 INJECTION, SOLUTION INTRATHECAL; INTRAVENOUS; SUBCUTANEOUS at 15:49

## 2019-05-11 RX ADMIN — Medication 500 MG: at 11:51

## 2019-05-11 RX ADMIN — ONDANSETRON 8 MG: 2 INJECTION INTRAMUSCULAR; INTRAVENOUS at 13:11

## 2019-05-11 RX ADMIN — VANCOMYCIN HYDROCHLORIDE 1250 MG: 10 INJECTION, POWDER, LYOPHILIZED, FOR SOLUTION INTRAVENOUS at 14:22

## 2019-05-11 RX ADMIN — LEVOFLOXACIN 500 MG: 500 TABLET, FILM COATED ORAL at 08:08

## 2019-05-11 RX ADMIN — CEFEPIME HYDROCHLORIDE 2 G: 2 INJECTION, POWDER, FOR SOLUTION INTRAVENOUS at 13:06

## 2019-05-11 NOTE — PROGRESS NOTES
0651-Bedside report received from Moni Ellis RN. Resting in bed. No needs voiced. No s/s of acute distress. 1805-END OF SHIFT NOTE: 
Pt's VSS and is in no acute distress. Pt on C1D3 of 7+3 and is doing well. Pt finished with IV abx today. Intake/Output 05/11 0701 - 05/11 1900 In: 0623 [P.O.:840; I.V.:234] Out: 1350 [JOLID:[id number]] Voiding: YES Catheter: NO 
Drain:   
 
Stool:  1 occurrences. Stool Assessment Stool Color: Berenice Scriver (05/11/19 1606) Stool Appearance: Formed (05/11/19 1606) Stool Amount: Small (05/11/19 1606) Stool Source/Status: Rectum (05/11/19 1606) Emesis:  0 occurrences. VITAL SIGNS Patient Vitals for the past 12 hrs: 
 Temp Pulse Resp BP SpO2  
05/11/19 1602 98.1 °F (36.7 °C) 66 18 155/49 98 % 05/11/19 1147 98 °F (36.7 °C) 71 18 145/63 96 % 05/11/19 0707 98.1 °F (36.7 °C) 65 17 151/66 95 % Pain Assessment Pain 1 Pain Scale 1: Numeric (0 - 10) (05/11/19 1412) Pain Intensity 1: 0 (05/11/19 1412) Patient Stated Pain Goal: 0 (05/10/19 2045) Ambulating Yes Pura Rai 1845-Bedside shift change report given to Mary Cody RN (oncoming nurse) by Derick Ye RN  (offgoing nurse). Report included the following information SBAR, Kardex, Intake/Output, MAR and Recent Results.

## 2019-05-11 NOTE — PROGRESS NOTES
[facility] Hematology & Oncology Inpatient Hematology / Oncology Progress Note Admission Date: 2019 10:53 AM 
Reason for Admission/Hospital Course: Admission for antineoplastic chemotherapy [Z51.11] 24 Hour Events: VSS/afebrile Day 7 vanc/cefe, change to Levaquin prophylaxis BMbx  completed Day 3 7+3 No new complaints ROS: 
Constitutional:  negative for fever, chills, weakness, malaise, fatigue. CV:  negative for chest pain, palpitations, edema. Respiratory: negative for dyspnea, cough, wheezing. GI: negative for nausea, abdominal pain, diarrhea. 10 point review of systems is otherwise negative with the exception of the elements mentioned above in the HPI. No Known Allergies OBJECTIVE: 
Patient Vitals for the past 8 hrs: 
 BP Temp Pulse Resp SpO2  
19 0707 151/66 98.1 °F (36.7 °C) 65 17 95 % 19 0222 142/66 97.4 °F (36.3 °C) 70 16 94 % Temp (24hrs), Av.1 °F (36.7 °C), Min:97.4 °F (36.3 °C), Max:98.7 °F (37.1 °C) 
 
 0701 -  1900 In: 480 [P.O.:480] Out: 300 [Urine:300] Physical Exam: 
Constitutional: Well developed, well nourished female in no acute distress, sitting comfortably in the hospital bedside chair HEENT: Normocephalic and atraumatic. Oropharynx is clear, mucous membranes are moist. Extraocular muscles are intact. Sclerae anicteric. Skin Warm and dry. No rash noted. No erythema. No pallor. Right port site continues to improve. Bruising to upper extremities bilaterally. Respiratory Lungs are clear to auscultation bilaterally without wheezes, rales or rhonchi, normal air exchange without accessory muscle use. CVS Normal rate, regular rhythm and normal S1 and S2. No murmurs, gallops, or rubs. Abdomen Soft, nontender and nondistended, normoactive bowel sounds. Neuro Grossly nonfocal with no obvious sensory or motor deficits. MSK Normal range of motion in general.  No edema and no tenderness. Psych Appropriate mood and affect. Labs: 
   
Recent Labs 05/11/19 
9382 05/10/19 
8363 05/09/19 
0350 WBC 6.6 5.2 5.2  
RBC 2.27* 2.43* 2.47* HGB 7.2* 7.8* 8.0*  
HCT 22.2* 23.6* 24.1*  
MCV 97.8 97.1 97.6 MCH 31.7 32.1 32.4 MCHC 32.4 33.1 33.2 RDW 17.0* 16.4* 17.0*  
PLT 57* 24* 33* GRANS 5* 6* 10* LYMPH 24 20 34 MONOS 71* 73* 15* EOS 0* 0*  --   
BASOS 0 0  --   
IG 0 1  --   
DF AUTOMATED AUTOMATED MANUAL ANEU 0.3* 0.3* 0.5* ABL 1.6 1.0 1.8 ABM 4.7* 3.8* 0.8 ALEKSANDR 0.0 0.0  --   
ABB 0.0 0.0  --   
AIG 0.0 0.1  --   
 
  
Recent Labs 05/11/19 
6610 05/10/19 
0441 05/09/19 
0350  141 141  
K 5.0 4.8 3.8 * 110* 110* CO2 24 25 24 AGAP 6* 6* 7 * 152* 91 BUN 22 13 9 CREA 0.78 0.76 0.66 GFRAA >60 >60 >60 GFRNA >60 >60 >60  
CA 9.0 8.7 8.6 SGOT 14* 17 17 AP 66 70 71  
TP 7.2 7.2 6.9 ALB 3.4 3.3 3.3 GLOB 3.8* 3.9* 3.6* AGRAT 0.9* 0.8* 0.9* MG 2.5* 2.4 2.2 PHOS 4.7* 3.2 2.6 Imaging: 
 
Medications: 
Current Facility-Administered Medications Medication Dose Route Frequency  levoFLOXacin (LEVAQUIN) tablet 500 mg  500 mg Oral ACB  
 0.9% sodium chloride infusion 250 mL  250 mL IntraVENous PRN  
 ondansetron (ZOFRAN) injection 8 mg  8 mg IntraVENous Q8H  
 dexamethasone (DECADRON) injection 10 mg  10 mg IntraVENous Q24H  
 LORazepam (ATIVAN) injection 0.5 mg  0.5 mg IntraVENous Q6H PRN  
 cytarabine (CYTOSAR) 205 mg in 0.9% sodium chloride 250 mL chemo infusion  100 mg/m2 (Treatment Plan Recorded) IntraVENous Q24H  
 IDArubicin (IDAMYCIN) 25 mg in dextrose 5% 50 mL chemo infusion  12 mg/m2 (Treatment Plan Recorded) IntraVENous Q24H  
 vancomycin (VANCOCIN) 1250 mg in  ml infusion  1,250 mg IntraVENous Q12H  
 acetaminophen (TYLENOL) tablet 650 mg  650 mg Oral PRN  
 diphenhydrAMINE (BENADRYL) capsule 25 mg  25 mg Oral PRN  potassium chloride (K-DUR, KLOR-CON) SR tablet 20 mEq  20 mEq Oral BID  
  0.9% sodium chloride infusion 250 mL  250 mL IntraVENous PRN  
 acetaminophen/diphenhydrAMINE (TYLENOL PM EXT STR) 500/25 mg   Oral QHS  acyclovir (ZOVIRAX) tablet 400 mg  400 mg Oral BID  allopurinol (ZYLOPRIM) tablet 300 mg  300 mg Oral DAILY  calcium carbonate (OS-CHRIS) tablet 500 mg [elemental]  500 mg Oral TID WITH MEALS  escitalopram oxalate (LEXAPRO) tablet 10 mg  10 mg Oral DAILY  fluconazole (DIFLUCAN) tablet 200 mg  200 mg Oral DAILY  lidocaine-prilocaine (EMLA) 2.5-2.5 % cream   Topical PRN  
 LORazepam (ATIVAN) tablet 1 mg  1 mg Oral QHS  ondansetron (ZOFRAN ODT) tablet 8 mg  8 mg Oral Q8H PRN  pravastatin (PRAVACHOL) tablet 10 mg  10 mg Oral QHS  vit C-E-zinc cit-lutein-zeaxan 50 mg-15 unit- 4.5 mg-2.5 mg chew (OCU-ABDOUL) 1 Tab (Patient Supplied)  1 Tab Oral DAILY  cefepime (MAXIPIME) 2 g in 0.9% sodium chloride (MBP/ADV) 100 mL  2 g IntraVENous Q12H  ergocalciferol capsule 50,000 Units  50,000 Units Oral every Wednesday ASSESSMENT: 
 
Problem List  Date Reviewed: 4/30/2019 Codes Class Noted Acute myeloid leukemia not having achieved remission ([facility] ICD-10-CM: C92.00 ICD-9-CM: 205.00  5/9/2019 Admission for antineoplastic chemotherapy ICD-10-CM: Z51.11 ICD-9-CM: V58.11  5/5/2019 AML (acute myeloblastic leukemia) ([facility].) ICD-10-CM: C92.00 ICD-9-CM: 205.00  4/28/2019 Weakness generalized ICD-10-CM: R53.1 ICD-9-CM: 780.79  4/28/2019 Pancytopenia ([facility].) ICD-10-CM: A12.954 ICD-9-CM: 284.19  4/28/2019 Thrombocytopenia ([facility].) ICD-10-CM: D69.6 ICD-9-CM: 287.5  4/27/2019 PLAN: 
AML FLT 3+ 
5/6  BMbx planned for Tuesday then wait for [facility] from port results to determine start date of  cycle one 7 + 3 with Midostaurin day 8-21. Platelets will need to be at least 50k for bx tomorrow 5/7. BMbx today-platelets prior. Also needed prbc today for hgb 6.9. 
5/08 Start 7+3 when afebrile per Dr. Benton Boxer. 5/09 Day 1 of 7+3. Monitor TLS labs. 5/10 Day 2 7+3. Tolerating well.  
 
  
Possible port infection 5/5 Day one vanc/cefe. Follow cultures. Do not use port. May need to have Echo if cultures positive. 5/6 BCNTD. Appears improved today. Continue to monitor. No fevers or other symptoms. 5/7 Day 3 vanc/cefe. Site appears even better today. 5/8 Does not appear infected. Site bruised. 5/9 BCx negative. Per ID okay to start treatment. 5/10 Continue Vanc/cef through tomorrow and stop. Levaquin prophylaxis at that time. Neutropenic Fever 05/05 Pan-culture negative. On Vancomycin, Maxipime 05/08 Febrile overnight up to 102.5. Repeat cultures x 1. Continue antibiotics. Likely disease related. Consult ID for clearance. 05/09 BC remain negative. No fevers 48 hours. Continue vanc/cefe. 5/10 Continue Vanc/cef through tomorrow and stop. Levaquin prophylaxis at that time. Alexander SOPs Supportive care ACV/Diflucan 
LVQ dc'd while on cefe and vanc Goals and plan of care reviewed with the patient. All questions answered to the best of our ability. I personally saw, exammed and counselled the patient, and discussed with NP, agree with above history/assessment/plan. 73 y. o.female new dx of AML FLT3+ transferred to [facility] for rx, however port was red and swollen and started vanc/cefepime for cellulitis/port infection, will follow blood culture to determine whether need to remove port, desired to salvage given prior PICC placement failure, midostaurin ordered and marrow biopsy planned to complete study.  Port removal was scheduled and Dr. Suri Baldwin discussed with me for lack of sign for active port infection and placing any other line would likely cause similar bruising, discussed with Dr. Humberto Khan and decided to keep port and continue antibiotics now, prepare to start induction. Fever 5/8/19 of no clear reason, repeat cultures but concern leukemia fever, started 7+3 induction 5/10/19, tolerated well and proceed to day 3, received plt for port bruising/oozing, supportive care Hugh Ferguson M.D. [address] Office : [phone] Fax : [phone]

## 2019-05-11 NOTE — PROGRESS NOTES
END OF SHIFT NOTE: 
Day +2  of 7+3 Patient appetite good no complain of break- trough nausea. No complain of pain. Shortness of breathe noted with exertion once; Oxygen saturations normal .  Day 2  dose Idamycin 25 mg IV given over 15  Minutes as directed Day 2 Cytarabine 205 mg  24 hour infusion , infusing @ 11.5 cc/hr infusing via medial port, excellent blood return noted. Patient receive one unit of Platelet prior to [facility] ER dressing change using aseptic technique. No active bleeding noted. Intake/Output No intake/output data recorded. Voiding: yes Catheter: no  
Drain:   
 
 
 
 
 
Stool:  No report  occurrences. Emesis:   No  occurrences. VITAL SIGNS Patient Vitals for the past 12 hrs: 
 Temp Pulse Resp BP SpO2  
05/10/19 1802 98.3 °F (36.8 °C) 77  145/67 96 % 05/10/19 1536     98 % 05/10/19 1511 98 °F (36.7 °C) 74 18 136/55 95 % 05/10/19 1214 98.3 °F (36.8 °C) 71 18 142/59 95 % Pain Assessment Pain 1 Pain Scale 1: Numeric (0 - 10) (05/10/19 1230) Pain Intensity 1: 0 (05/10/19 1230) Patient Stated Pain Goal: 0 (05/07/19 1425) Ambulating Yes in hallway with protective mask in place Additional Information:   See above Shift report given to oncoming nurse Michelle Reyes RN  at the bedside.  
 
Dina Handy RN

## 2019-05-11 NOTE — PROGRESS NOTES
Problem: Falls - Risk of 
Goal: *Absence of Falls Description Document Karla Martin Fall Risk and appropriate interventions in the flowsheet. Outcome: Progressing Towards Goal 
Note:  
Fall Risk Interventions: 
  
 
  
 
Medication Interventions: Evaluate medications/consider consulting pharmacy, Teach patient to arise slowly

## 2019-05-11 NOTE — PROGRESS NOTES
Problem: Falls - Risk of 
Goal: *Absence of Falls Description Document Kamaljit Monaco Fall Risk and appropriate interventions in the flowsheet. Outcome: Progressing Towards Goal 
  
Problem: Patient Education: Go to Patient Education Activity Goal: Patient/Family Education Outcome: Progressing Towards Goal 
  
Problem: Anxiety Goal: *Alleviation of anxiety Outcome: Progressing Towards Goal 
Goal: *Alleviation of anxiety (Palliative Care) Outcome: Progressing Towards Goal 
  
Problem: Patient Education: Go to Patient Education Activity Goal: Patient/Family Education Outcome: Progressing Towards Goal 
  
Problem: Discharge Planning Goal: *Discharge to safe environment Outcome: Progressing Towards Goal 
Goal: *Knowledge of medication management Outcome: Progressing Towards Goal 
Goal: *Knowledge of discharge instructions Outcome: Progressing Towards Goal 
  
Problem: Patient Education: Go to Patient Education Activity Goal: Patient/Family Education Outcome: Progressing Towards Goal 
  
Problem: Anemia Care Plan (Adult and Pediatric) Goal: *Labs within defined limits Outcome: Progressing Towards Goal 
Goal: *Tolerates increased activity Outcome: Progressing Towards Goal 
  
Problem: Patient Education: Go to Patient Education Activity Goal: Patient/Family Education Outcome: Progressing Towards Goal 
  
Problem: Pain Goal: *Control of Pain Outcome: Progressing Towards Goal 
  
Problem: Patient Education: Go to Patient Education Activity Goal: Patient/Family Education Outcome: Progressing Towards Goal

## 2019-05-11 NOTE — PROGRESS NOTES
END OF SHIFT NOTE: 
 
Intake/Output 05/10 1901 - 05/11 0700 In: 387 [I.V.:387] Out: 2135 [PIHIO:5635] Voiding: YES Catheter: NO 
Drain:   
 
 
 
 
 
Stool:  0 occurrences. Emesis:  0 occurrences. VITAL SIGNS Patient Vitals for the past 12 hrs: 
 Temp Pulse Resp BP SpO2  
05/11/19 0222 97.4 °F (36.3 °C) 70 16 142/66 94 % 05/10/19 2305 98 °F (36.7 °C) 71 18 138/65 95 % 05/10/19 1930 98.7 °F (37.1 °C) 75 18 154/66 96 % Pain Assessment Pain 1 Pain Scale 1: Numeric (0 - 10) (05/10/19 2045) Pain Intensity 1: 0 (05/10/19 2045) Patient Stated Pain Goal: 0 (05/10/19 2045) Ambulating Yes Additional Information: Afebrile. Tolerating continuous cytarabine infusion. Pt resting quietly. No visible s/sx of distress. No needs voiced at this time. Shift report will be given to oncoming nurse at the bedside.  
 
Margarita Wynne RN

## 2019-05-12 LAB
ALBUMIN SERPL-MCNC: 3.2 G/DL (ref 3.2–4.6)
ALBUMIN/GLOB SERPL: 1 {RATIO} (ref 1.2–3.5)
ALP SERPL-CCNC: 57 U/L (ref 50–136)
ALT SERPL-CCNC: 22 U/L (ref 12–65)
ANION GAP SERPL CALC-SCNC: 11 MMOL/L (ref 7–16)
AST SERPL-CCNC: 9 U/L (ref 15–37)
BACTERIA SPEC CULT: NORMAL
BASOPHILS # BLD: 0 K/UL (ref 0–0.2)
BASOPHILS NFR BLD: 0 % (ref 0–2)
BILIRUB SERPL-MCNC: 0.3 MG/DL (ref 0.2–1.1)
BUN SERPL-MCNC: 30 MG/DL (ref 8–23)
CALCIUM SERPL-MCNC: 8.6 MG/DL (ref 8.3–10.4)
CHLORIDE SERPL-SCNC: 106 MMOL/L (ref 98–107)
CO2 SERPL-SCNC: 22 MMOL/L (ref 21–32)
CREAT SERPL-MCNC: 0.9 MG/DL (ref 0.6–1)
DIFFERENTIAL METHOD BLD: ABNORMAL
EOSINOPHIL # BLD: 0 K/UL (ref 0–0.8)
EOSINOPHIL NFR BLD: 0 % (ref 0.5–7.8)
ERYTHROCYTE [DISTWIDTH] IN BLOOD BY AUTOMATED COUNT: 16.3 % (ref 11.9–14.6)
GLOBULIN SER CALC-MCNC: 3.3 G/DL (ref 2.3–3.5)
GLUCOSE SERPL-MCNC: 144 MG/DL (ref 65–100)
HCT VFR BLD AUTO: 20.5 % (ref 35.8–46.3)
HGB BLD-MCNC: 6.7 G/DL (ref 11.7–15.4)
IMM GRANULOCYTES # BLD AUTO: 0 K/UL (ref 0–0.5)
IMM GRANULOCYTES NFR BLD AUTO: 0 % (ref 0–5)
LDH SERPL L TO P-CCNC: 274 U/L (ref 110–210)
LYMPHOCYTES # BLD: 0.3 K/UL (ref 0.5–4.6)
LYMPHOCYTES NFR BLD: 19 % (ref 13–44)
MAGNESIUM SERPL-MCNC: 2.8 MG/DL (ref 1.8–2.4)
MCH RBC QN AUTO: 31.9 PG (ref 26.1–32.9)
MCHC RBC AUTO-ENTMCNC: 32.7 G/DL (ref 31.4–35)
MCV RBC AUTO: 97.6 FL (ref 79.6–97.8)
MONOCYTES # BLD: 1.1 K/UL (ref 0.1–1.3)
MONOCYTES NFR BLD: 68 % (ref 4–12)
NEUTS SEG # BLD: 0.2 K/UL (ref 1.7–8.2)
NEUTS SEG NFR BLD: 13 % (ref 43–78)
NRBC # BLD: 0 K/UL (ref 0–0.2)
PHOSPHATE SERPL-MCNC: 5.4 MG/DL (ref 2.3–3.7)
PLATELET # BLD AUTO: 35 K/UL (ref 150–450)
PLATELET COMMENTS,PCOM: ABNORMAL
PMV BLD AUTO: 11.2 FL (ref 9.4–12.3)
POTASSIUM SERPL-SCNC: 4.4 MMOL/L (ref 3.5–5.1)
PROT SERPL-MCNC: 6.5 G/DL (ref 6.3–8.2)
RBC # BLD AUTO: 2.1 M/UL (ref 4.05–5.2)
RBC MORPH BLD: ABNORMAL
RBC MORPH BLD: ABNORMAL
SERVICE CMNT-IMP: NORMAL
SODIUM SERPL-SCNC: 139 MMOL/L (ref 136–145)
URATE SERPL-MCNC: 5.4 MG/DL (ref 2.6–6)
WBC # BLD AUTO: 1.6 K/UL (ref 4.3–11.1)
WBC MORPH BLD: SLIGHT

## 2019-05-12 PROCEDURE — 84100 ASSAY OF PHOSPHORUS: CPT

## 2019-05-12 PROCEDURE — 86644 CMV ANTIBODY: CPT

## 2019-05-12 PROCEDURE — 80053 COMPREHEN METABOLIC PANEL: CPT

## 2019-05-12 PROCEDURE — 86923 COMPATIBILITY TEST ELECTRIC: CPT

## 2019-05-12 PROCEDURE — 36591 DRAW BLOOD OFF VENOUS DEVICE: CPT

## 2019-05-12 PROCEDURE — 74011250637 HC RX REV CODE- 250/637: Performed by: INTERNAL MEDICINE

## 2019-05-12 PROCEDURE — 85025 COMPLETE CBC W/AUTO DIFF WBC: CPT

## 2019-05-12 PROCEDURE — 74011250637 HC RX REV CODE- 250/637: Performed by: NURSE PRACTITIONER

## 2019-05-12 PROCEDURE — 84550 ASSAY OF BLOOD/URIC ACID: CPT

## 2019-05-12 PROCEDURE — 83615 LACTATE (LD) (LDH) ENZYME: CPT

## 2019-05-12 PROCEDURE — 65270000029 HC RM PRIVATE

## 2019-05-12 PROCEDURE — 74011250636 HC RX REV CODE- 250/636: Performed by: INTERNAL MEDICINE

## 2019-05-12 PROCEDURE — 99232 SBSQ HOSP IP/OBS MODERATE 35: CPT | Performed by: INTERNAL MEDICINE

## 2019-05-12 PROCEDURE — 36430 TRANSFUSION BLD/BLD COMPNT: CPT | Performed by: NURSE PRACTITIONER

## 2019-05-12 PROCEDURE — P9040 RBC LEUKOREDUCED IRRADIATED: HCPCS

## 2019-05-12 PROCEDURE — 83735 ASSAY OF MAGNESIUM: CPT

## 2019-05-12 PROCEDURE — 86900 BLOOD TYPING SEROLOGIC ABO: CPT

## 2019-05-12 RX ORDER — POLYETHYLENE GLYCOL 3350 17 G/17G
17 POWDER, FOR SOLUTION ORAL
Status: DISCONTINUED | OUTPATIENT
Start: 2019-05-12 | End: 2019-06-06

## 2019-05-12 RX ORDER — SODIUM CHLORIDE 9 MG/ML
250 INJECTION, SOLUTION INTRAVENOUS AS NEEDED
Status: DISCONTINUED | OUTPATIENT
Start: 2019-05-12 | End: 2019-05-29

## 2019-05-12 RX ADMIN — DEXAMETHASONE SODIUM PHOSPHATE 10 MG: 10 INJECTION INTRAMUSCULAR; INTRAVENOUS at 14:54

## 2019-05-12 RX ADMIN — FLUCONAZOLE 200 MG: 100 TABLET ORAL at 08:28

## 2019-05-12 RX ADMIN — Medication 500 MG: at 08:28

## 2019-05-12 RX ADMIN — Medication 500 MG: at 17:27

## 2019-05-12 RX ADMIN — ONDANSETRON 8 MG: 2 INJECTION INTRAMUSCULAR; INTRAVENOUS at 06:18

## 2019-05-12 RX ADMIN — POTASSIUM CHLORIDE 20 MEQ: 20 TABLET, EXTENDED RELEASE ORAL at 17:27

## 2019-05-12 RX ADMIN — ACYCLOVIR 400 MG: 800 TABLET ORAL at 17:27

## 2019-05-12 RX ADMIN — ESCITALOPRAM OXALATE 10 MG: 10 TABLET ORAL at 08:28

## 2019-05-12 RX ADMIN — POTASSIUM CHLORIDE 20 MEQ: 20 TABLET, EXTENDED RELEASE ORAL at 08:28

## 2019-05-12 RX ADMIN — ALLOPURINOL 300 MG: 300 TABLET ORAL at 08:28

## 2019-05-12 RX ADMIN — LORAZEPAM 1 MG: 1 TABLET ORAL at 21:48

## 2019-05-12 RX ADMIN — Medication 500 MG: at 11:08

## 2019-05-12 RX ADMIN — ACETAMINOPHEN 650 MG: 325 TABLET, FILM COATED ORAL at 11:08

## 2019-05-12 RX ADMIN — ONDANSETRON 8 MG: 2 INJECTION INTRAMUSCULAR; INTRAVENOUS at 21:49

## 2019-05-12 RX ADMIN — DIPHENHYDRAMINE HYDROCHLORIDE 25 MG: 25 CAPSULE ORAL at 11:08

## 2019-05-12 RX ADMIN — ACETAMINOPHEN: 500 TABLET, FILM COATED ORAL at 21:49

## 2019-05-12 RX ADMIN — PRAVASTATIN SODIUM 10 MG: 20 TABLET ORAL at 21:48

## 2019-05-12 RX ADMIN — ONDANSETRON 8 MG: 2 INJECTION INTRAMUSCULAR; INTRAVENOUS at 14:54

## 2019-05-12 RX ADMIN — ACYCLOVIR 400 MG: 800 TABLET ORAL at 08:28

## 2019-05-12 RX ADMIN — LEVOFLOXACIN 500 MG: 500 TABLET, FILM COATED ORAL at 08:28

## 2019-05-12 RX ADMIN — CYTARABINE 205 MG: 100 INJECTION, SOLUTION INTRATHECAL; INTRAVENOUS; SUBCUTANEOUS at 15:21

## 2019-05-12 NOTE — PROGRESS NOTES
[facility] Hematology & Oncology Inpatient Hematology / Oncology Progress Note Admission Date: 2019 10:53 AM 
Reason for Admission/Hospital Course: Admission for antineoplastic chemotherapy [Z51.11] 24 Hour Events: VSS/afebrile Completed vanc/cefe for 7 days, change to Levaquin prophylaxis BMbx  completed Day 4 7+3 No new complaints ROS: 
Constitutional:  negative for fever, chills, weakness, malaise, fatigue. CV:  negative for chest pain, palpitations, edema. Respiratory: negative for dyspnea, cough, wheezing. GI: negative for nausea, abdominal pain, diarrhea. 10 point review of systems is otherwise negative with the exception of the elements mentioned above in the HPI. No Known Allergies OBJECTIVE: 
Patient Vitals for the past 8 hrs: 
 BP Temp Pulse Resp SpO2  
19 0742 140/53 98.4 °F (36.9 °C) (!) 58 17 96 % 19 0315 120/51 97.9 °F (36.6 °C) 74 18 94 % Temp (24hrs), Av.2 °F (36.8 °C), Min:97.9 °F (36.6 °C), Max:98.5 °F (36.9 °C) 
 
 0701 -  1900 In: 360 [P.O.:360] Out: 450 [Urine:450] Physical Exam: 
Constitutional: Well developed, well nourished female in no acute distress, sitting comfortably in the hospital bedside chair HEENT: Normocephalic and atraumatic. Oropharynx is clear, mucous membranes are moist. Extraocular muscles are intact. Sclerae anicteric. Skin Warm and dry. No rash noted. No erythema. No pallor. Right port site continues to improve. Bruising to upper extremities bilaterally. Respiratory Lungs are clear to auscultation bilaterally without wheezes, rales or rhonchi, normal air exchange without accessory muscle use. CVS Normal rate, regular rhythm and normal S1 and S2. No murmurs, gallops, or rubs. Abdomen Soft, nontender and nondistended, normoactive bowel sounds. Neuro Grossly nonfocal with no obvious sensory or motor deficits. MSK Normal range of motion in general.  No edema and no tenderness. Psych Appropriate mood and affect. Labs: 
   
Recent Labs 05/12/19 
6373 05/11/19 
0739 05/10/19 
6027 WBC 1.6* 6.6 5.2  
RBC 2.10* 2.27* 2.43* HGB 6.7* 7.2* 7.8* HCT 20.5* 22.2* 23.6* MCV 97.6 97.8 97.1 MCH 31.9 31.7 32.1 MCHC 32.7 32.4 33.1 RDW 16.3* 17.0* 16.4*  
PLT 35* 57* 24* GRANS 13* 5* 6*  
LYMPH 19 24 20 MONOS 68* 71* 73* EOS 0* 0* 0*  
BASOS 0 0 0 IG 0 0 1 DF AUTOMATED AUTOMATED AUTOMATED ANEU 0.2* 0.3* 0.3* ABL 0.3* 1.6 1.0 ABM 1.1 4.7* 3.8* ALEKSANDR 0.0 0.0 0.0 ABB 0.0 0.0 0.0 AIG 0.0 0.0 0.1 Recent Labs 05/12/19 
7256 05/11/19 
5244 05/10/19 
6120  139 141  
K 4.4 5.0 4.8  
 109* 110* CO2 22 24 25 AGAP 11 6* 6*  
* 155* 152* BUN 30* 22 13 CREA 0.90 0.78 0.76 GFRAA >60 >60 >60 GFRNA >60 >60 >60  
CA 8.6 9.0 8.7 SGOT 9* 14* 17  
AP 57 66 70  
TP 6.5 7.2 7.2 ALB 3.2 3.4 3.3 GLOB 3.3 3.8* 3.9* AGRAT 1.0* 0.9* 0.8* MG 2.8* 2.5* 2.4 PHOS 5.4* 4.7* 3.2 Imaging: 
 
Medications: 
Current Facility-Administered Medications Medication Dose Route Frequency  0.9% sodium chloride infusion 250 mL  250 mL IntraVENous PRN  
 levoFLOXacin (LEVAQUIN) tablet 500 mg  500 mg Oral ACB  ondansetron (ZOFRAN) injection 8 mg  8 mg IntraVENous Q8H  
 dexamethasone (DECADRON) injection 10 mg  10 mg IntraVENous Q24H  
 LORazepam (ATIVAN) injection 0.5 mg  0.5 mg IntraVENous Q6H PRN  
 cytarabine (CYTOSAR) 205 mg in 0.9% sodium chloride 250 mL chemo infusion  100 mg/m2 (Treatment Plan Recorded) IntraVENous Q24H  
 acetaminophen (TYLENOL) tablet 650 mg  650 mg Oral PRN  
 diphenhydrAMINE (BENADRYL) capsule 25 mg  25 mg Oral PRN  potassium chloride (K-DUR, KLOR-CON) SR tablet 20 mEq  20 mEq Oral BID  acetaminophen/diphenhydrAMINE (TYLENOL PM EXT STR) 500/25 mg   Oral QHS  acyclovir (ZOVIRAX) tablet 400 mg  400 mg Oral BID  
  allopurinol (ZYLOPRIM) tablet 300 mg  300 mg Oral DAILY  calcium carbonate (OS-CHRIS) tablet 500 mg [elemental]  500 mg Oral TID WITH MEALS  escitalopram oxalate (LEXAPRO) tablet 10 mg  10 mg Oral DAILY  fluconazole (DIFLUCAN) tablet 200 mg  200 mg Oral DAILY  lidocaine-prilocaine (EMLA) 2.5-2.5 % cream   Topical PRN  
 LORazepam (ATIVAN) tablet 1 mg  1 mg Oral QHS  ondansetron (ZOFRAN ODT) tablet 8 mg  8 mg Oral Q8H PRN  pravastatin (PRAVACHOL) tablet 10 mg  10 mg Oral QHS  vit C-E-zinc cit-lutein-zeaxan 50 mg-15 unit- 4.5 mg-2.5 mg chew (OCU-ABDOUL) 1 Tab (Patient Supplied)  1 Tab Oral DAILY  ergocalciferol capsule 50,000 Units  50,000 Units Oral every Wednesday ASSESSMENT: 
 
Problem List  Date Reviewed: 4/30/2019 Codes Class Noted Acute myeloid leukemia not having achieved remission ([facility] ICD-10-CM: C92.00 ICD-9-CM: 205.00  5/9/2019 Admission for antineoplastic chemotherapy ICD-10-CM: Z51.11 ICD-9-CM: V58.11  5/5/2019 AML (acute myeloblastic leukemia) ([facility].) ICD-10-CM: C92.00 ICD-9-CM: 205.00  4/28/2019 Weakness generalized ICD-10-CM: R53.1 ICD-9-CM: 780.79  4/28/2019 Pancytopenia ([facility].) ICD-10-CM: H65.668 ICD-9-CM: 284.19  4/28/2019 Thrombocytopenia ([facility] ICD-10-CM: D69.6 ICD-9-CM: 287.5  4/27/2019 PLAN: 
AML FLT 3+ 
5/6  BMbx planned for Tuesday then wait for [facility] from port results to determine start date of  cycle one 7 + 3 with Midostaurin day 8-21. Platelets will need to be at least 50k for bx tomorrow 5/7. BMbx today-platelets prior. Also needed prbc today for hgb 6.9. 
5/08 Start 7+3 when afebrile per Dr. Laith Montgomery. 5/09 Day 1 of 7+3. Monitor TLS labs. 5/10 Day 2 7+3. Tolerating well.  
 
  
Possible port infection 5/5 Day one vanc/cefe. Follow cultures. Do not use port. May need to have Echo if cultures positive. 5/6 BCNTD. Appears improved today. Continue to monitor. No fevers or other symptoms. 5/7 Day 3 vanc/cefe. Site appears even better today. 5/8 Does not appear infected. Site bruised. 5/9 BCx negative. Per ID okay to start treatment. 5/10 Continue Vanc/cef through tomorrow and stop. Levaquin prophylaxis at that time. Neutropenic Fever 05/05 Pan-culture negative. On Vancomycin, Maxipime 05/08 Febrile overnight up to 102.5. Repeat cultures x 1. Continue antibiotics. Likely disease related. Consult ID for clearance. 05/09 BC remain negative. No fevers 48 hours. Continue vanc/cefe. 5/10 Continue Vanc/cef through tomorrow and stop. Levaquin prophylaxis at that time. Alexander SOPs Supportive care ACV/Diflucan 
LVQ dc'd while on cefe and vanc Goals and plan of care reviewed with the patient. All questions answered to the best of our ability. I personally saw, exammed and counselled the patient, and discussed with NP, agree with above history/assessment/plan. 73 y. o.female new dx of AML FLT3+ transferred to [facility] for rx, however port was red and swollen and started vanc/cefepime for cellulitis/port infection, will follow blood culture to determine whether need to remove port, desired to salvage given prior PICC placement failure, midostaurin ordered and marrow biopsy planned to complete study. Port removal was scheduled and Dr. Moon Garrido discussed with me for lack of sign for active port infection and placing any other line would likely cause similar bruising, discussed with Dr. Pippa Roman and decided to keep port and continue antibiotics now, prepare to start induction. Fever 5/8/19 of no clear reason, repeat cultures but concern leukemia fever, started 7+3 induction 5/10/19, tolerated well and proceed to day 4, received plt for port bruising/oozing, give pRBC, supportive care Natali Toussaint M.D. [address] Office : [phone] Fax : [phone]

## 2019-05-12 NOTE — PROGRESS NOTES
Problem: Falls - Risk of 
Goal: *Absence of Falls Description Document Abel Perez Fall Risk and appropriate interventions in the flowsheet. Outcome: Progressing Towards Goal 
  
Problem: Anxiety Goal: *Alleviation of anxiety Outcome: Progressing Towards Goal 
Goal: *Alleviation of anxiety (Palliative Care) Outcome: Progressing Towards Goal 
  
Problem: Discharge Planning Goal: *Discharge to safe environment Outcome: Progressing Towards Goal 
Goal: *Knowledge of medication management Outcome: Progressing Towards Goal 
Goal: *Knowledge of discharge instructions Outcome: Progressing Towards Goal 
  
Problem: Anemia Care Plan (Adult and Pediatric) Goal: *Labs within defined limits Outcome: Progressing Towards Goal 
Goal: *Tolerates increased activity Outcome: Progressing Towards Goal 
  
Problem: Pain Goal: *Control of Pain Outcome: Progressing Towards Goal

## 2019-05-12 NOTE — PROGRESS NOTES
0650-Bedside report received from Sharda Lees RN. Resting in bed. No needs voiced. No s/s of acute distress. 1505-END OF SHIFT NOTE: 
Pt's VSS and is in no acute distress. Pt on C1D4 of 7+3. Intake/Output 05/12 0701 - 05/12 1900 In: 658 [P.O.:360] Out: 850 [Urine:850] Voiding: YES Catheter: NO 
Drain:   
 
Stool:  0 occurrences. Stool Assessment Stool Color: Jessica Medicine (05/11/19 1606) Stool Appearance: Formed (05/11/19 1606) Stool Amount: Small (05/11/19 1606) Stool Source/Status: Rectum (05/11/19 1606) Emesis:  0 occurrences. VITAL SIGNS Patient Vitals for the past 12 hrs: 
 Temp Pulse Resp BP SpO2  
05/12/19 1454 98.1 °F (36.7 °C) 64 18 (!) 179/99 96 % 05/12/19 1334 98.5 °F (36.9 °C) 65 16 170/62 97 % 05/12/19 1249 97.5 °F (36.4 °C) 61 17 155/78 97 % 05/12/19 1147 99.4 °F (37.4 °C) (!) 55 16 146/57 96 % 05/12/19 1105 98.2 °F (36.8 °C) (!) 56 18 149/62 96 % 05/12/19 0742 98.4 °F (36.9 °C) (!) 58 17 140/53 96 % 05/12/19 0315 97.9 °F (36.6 °C) 74 18 120/51 94 % Pain Assessment Pain 1 Pain Scale 1: Numeric (0 - 10) (05/12/19 1401) Pain Intensity 1: 0 (05/12/19 1401) Patient Stated Pain Goal: 0 (05/10/19 2045) Ambulating Yes Omar Rai Bedside shift change report given to Soniya Martinez RN (oncoming nurse) by Geovanny Smith RN (offgoing nurse). Report included the following information SBAR, Kardex, Intake/Output, MAR and Recent Results.

## 2019-05-13 LAB
ABO + RH BLD: NORMAL
ALBUMIN SERPL-MCNC: 3.2 G/DL (ref 3.2–4.6)
ALBUMIN/GLOB SERPL: 1 {RATIO} (ref 1.2–3.5)
ALP SERPL-CCNC: 57 U/L (ref 50–136)
ALT SERPL-CCNC: 21 U/L (ref 12–65)
ANION GAP SERPL CALC-SCNC: 8 MMOL/L (ref 7–16)
AST SERPL-CCNC: 7 U/L (ref 15–37)
BILIRUB SERPL-MCNC: 0.4 MG/DL (ref 0.2–1.1)
BLD PROD TYP BPU: NORMAL
BLOOD GROUP ANTIBODIES SERPL: NORMAL
BPU ID: NORMAL
BUN SERPL-MCNC: 29 MG/DL (ref 8–23)
CALCIUM SERPL-MCNC: 8.8 MG/DL (ref 8.3–10.4)
CHLORIDE SERPL-SCNC: 106 MMOL/L (ref 98–107)
CO2 SERPL-SCNC: 24 MMOL/L (ref 21–32)
CREAT SERPL-MCNC: 0.8 MG/DL (ref 0.6–1)
CROSSMATCH RESULT,%XM: NORMAL
DIFFERENTIAL METHOD BLD: ABNORMAL
ERYTHROCYTE [DISTWIDTH] IN BLOOD BY AUTOMATED COUNT: 15.5 % (ref 11.9–14.6)
GLOBULIN SER CALC-MCNC: 3.3 G/DL (ref 2.3–3.5)
GLUCOSE SERPL-MCNC: 152 MG/DL (ref 65–100)
HCT VFR BLD AUTO: 22.4 % (ref 35.8–46.3)
HGB BLD-MCNC: 7.6 G/DL (ref 11.7–15.4)
LDH SERPL L TO P-CCNC: 238 U/L (ref 110–210)
MAGNESIUM SERPL-MCNC: 2.7 MG/DL (ref 1.8–2.4)
MCH RBC QN AUTO: 32.1 PG (ref 26.1–32.9)
MCHC RBC AUTO-ENTMCNC: 33.9 G/DL (ref 31.4–35)
MCV RBC AUTO: 94.5 FL (ref 79.6–97.8)
NRBC # BLD: 0 K/UL (ref 0–0.2)
PHOSPHATE SERPL-MCNC: 4.9 MG/DL (ref 2.3–3.7)
PLATELET # BLD AUTO: 15 K/UL (ref 150–450)
PLATELET COMMENTS,PCOM: ABNORMAL
PMV BLD AUTO: 10 FL (ref 9.4–12.3)
POTASSIUM SERPL-SCNC: 4.5 MMOL/L (ref 3.5–5.1)
PROT SERPL-MCNC: 6.5 G/DL (ref 6.3–8.2)
RBC # BLD AUTO: 2.37 M/UL (ref 4.05–5.2)
RBC MORPH BLD: ABNORMAL
SODIUM SERPL-SCNC: 138 MMOL/L (ref 136–145)
SPECIMEN EXP DATE BLD: NORMAL
STATUS OF UNIT,%ST: NORMAL
UNIT DIVISION, %UDIV: 0
URATE SERPL-MCNC: 5.5 MG/DL (ref 2.6–6)
WBC # BLD AUTO: 0.4 K/UL (ref 4.3–11.1)
WBC MORPH BLD: ABNORMAL

## 2019-05-13 PROCEDURE — 74011250636 HC RX REV CODE- 250/636: Performed by: INTERNAL MEDICINE

## 2019-05-13 PROCEDURE — 36591 DRAW BLOOD OFF VENOUS DEVICE: CPT

## 2019-05-13 PROCEDURE — 84550 ASSAY OF BLOOD/URIC ACID: CPT

## 2019-05-13 PROCEDURE — 65270000029 HC RM PRIVATE

## 2019-05-13 PROCEDURE — 99232 SBSQ HOSP IP/OBS MODERATE 35: CPT | Performed by: INTERNAL MEDICINE

## 2019-05-13 PROCEDURE — 85025 COMPLETE CBC W/AUTO DIFF WBC: CPT

## 2019-05-13 PROCEDURE — 83615 LACTATE (LD) (LDH) ENZYME: CPT

## 2019-05-13 PROCEDURE — 74011250637 HC RX REV CODE- 250/637: Performed by: INTERNAL MEDICINE

## 2019-05-13 PROCEDURE — 84100 ASSAY OF PHOSPHORUS: CPT

## 2019-05-13 PROCEDURE — 77030018846 HC SOL IRR STRL H20 ICUM -A

## 2019-05-13 PROCEDURE — 80053 COMPREHEN METABOLIC PANEL: CPT

## 2019-05-13 PROCEDURE — 74011250637 HC RX REV CODE- 250/637: Performed by: NURSE PRACTITIONER

## 2019-05-13 PROCEDURE — 83735 ASSAY OF MAGNESIUM: CPT

## 2019-05-13 RX ORDER — SODIUM CHLORIDE 0.9 % (FLUSH) 0.9 %
10 SYRINGE (ML) INJECTION AS NEEDED
Status: DISCONTINUED | OUTPATIENT
Start: 2019-05-13 | End: 2019-06-07 | Stop reason: HOSPADM

## 2019-05-13 RX ORDER — HEPARIN 100 UNIT/ML
300 SYRINGE INTRAVENOUS AS NEEDED
Status: DISCONTINUED | OUTPATIENT
Start: 2019-05-13 | End: 2019-05-21

## 2019-05-13 RX ADMIN — ALLOPURINOL 300 MG: 300 TABLET ORAL at 08:04

## 2019-05-13 RX ADMIN — ONDANSETRON 8 MG: 2 INJECTION INTRAMUSCULAR; INTRAVENOUS at 05:32

## 2019-05-13 RX ADMIN — POTASSIUM CHLORIDE 20 MEQ: 20 TABLET, EXTENDED RELEASE ORAL at 17:11

## 2019-05-13 RX ADMIN — LORAZEPAM 1 MG: 1 TABLET ORAL at 21:46

## 2019-05-13 RX ADMIN — Medication 10 ML: at 21:48

## 2019-05-13 RX ADMIN — CYTARABINE 205 MG: 100 INJECTION, SOLUTION INTRATHECAL; INTRAVENOUS; SUBCUTANEOUS at 15:33

## 2019-05-13 RX ADMIN — Medication 500 MG: at 08:04

## 2019-05-13 RX ADMIN — LEVOFLOXACIN 500 MG: 500 TABLET, FILM COATED ORAL at 08:04

## 2019-05-13 RX ADMIN — ONDANSETRON 8 MG: 2 INJECTION INTRAMUSCULAR; INTRAVENOUS at 21:46

## 2019-05-13 RX ADMIN — Medication 500 MG: at 17:11

## 2019-05-13 RX ADMIN — ACETAMINOPHEN: 500 TABLET, FILM COATED ORAL at 21:46

## 2019-05-13 RX ADMIN — ACYCLOVIR 400 MG: 800 TABLET ORAL at 17:11

## 2019-05-13 RX ADMIN — ACYCLOVIR 400 MG: 800 TABLET ORAL at 08:04

## 2019-05-13 RX ADMIN — ONDANSETRON 8 MG: 2 INJECTION INTRAMUSCULAR; INTRAVENOUS at 15:26

## 2019-05-13 RX ADMIN — ESCITALOPRAM OXALATE 10 MG: 10 TABLET ORAL at 08:04

## 2019-05-13 RX ADMIN — POTASSIUM CHLORIDE 20 MEQ: 20 TABLET, EXTENDED RELEASE ORAL at 08:04

## 2019-05-13 RX ADMIN — Medication 500 MG: at 11:51

## 2019-05-13 RX ADMIN — FLUCONAZOLE 200 MG: 100 TABLET ORAL at 08:04

## 2019-05-13 RX ADMIN — DEXAMETHASONE SODIUM PHOSPHATE 10 MG: 10 INJECTION INTRAMUSCULAR; INTRAVENOUS at 15:26

## 2019-05-13 RX ADMIN — PRAVASTATIN SODIUM 10 MG: 20 TABLET ORAL at 21:46

## 2019-05-13 NOTE — PROGRESS NOTES
Problem: Falls - Risk of 
Goal: *Absence of Falls Description Document Lamont Russo Fall Risk and appropriate interventions in the flowsheet. Outcome: Progressing Towards Goal 
  
Problem: Anxiety Goal: *Alleviation of anxiety Outcome: Progressing Towards Goal 
Goal: *Alleviation of anxiety (Palliative Care) Outcome: Progressing Towards Goal 
  
Problem: Discharge Planning Goal: *Discharge to safe environment Outcome: Progressing Towards Goal 
Goal: *Knowledge of medication management Outcome: Progressing Towards Goal 
Goal: *Knowledge of discharge instructions Outcome: Progressing Towards Goal 
  
Problem: Anemia Care Plan (Adult and Pediatric) Goal: *Labs within defined limits Outcome: Progressing Towards Goal 
Goal: *Tolerates increased activity Outcome: Progressing Towards Goal 
  
Problem: Pain Goal: *Control of Pain Outcome: Progressing Towards Goal

## 2019-05-13 NOTE — PROGRESS NOTES
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 Plan: · Completed vanc/cefepime  and transitioned to levofloxacin. Anti-infectives: · LVQ (-) ( - · Vanc/Cefepime (-) Subjective: Interval History: Tmax 99.4. WBCs dropping, now 400. Hemoglobin 7.6. She denies nausea, vomiting, diarrhea, and reports she is feeling better. Denies chills, still with some night sweats. Port site is mildly tender; she reports it works well. No Known Allergies Review of Systems:  A comprehensive review of systems was negative except for that written in the History of Present Illness. Objective:  
 
Visit Vitals /48 (BP 1 Location: Right arm, BP Patient Position: Sitting) Pulse (!) 47 Temp 97.4 °F (36.3 °C) Resp 18 Ht 5' 6\" (1.676 m) Wt 91 kg (200 lb 9.6 oz) SpO2 97% Breastfeeding? No  
BMI 32.38 kg/m² Temp (24hrs), Av.1 °F (36.7 °C), Min:97.4 °F (36.3 °C), Max:99.4 °F (37.4 °C) Lines:  Peripheral IV: LUE, benign; R chest PORT accessed, mildly tender. Physical Exam:   
General:  Alert, cooperative, well-nourished, well-developed, appears stated age and no acute distress Eyes: Anicteric sclera, no drainage, not injected, EOMI, glasses Mouth/Throat: Mucous membranes normal, oral pharynx clear Neck: Supple, midline trachea Lungs:   Lungs clear and without audible wheezes, no increased work of breathing, room air CV:  RRR without audible murmur Abdomen:   Soft, non tender Extremities: No cyanosis, trace pedal edema Skin: No rash, texture/turgor normal  
Lymph nodes: Cervical and supraclavicular normal  
Musculoskeletal: No swelling or deformity Lines/Devices:  R chest port mildly tender, no erythema Psych: Alert, oriented, appropriate Data Review: CBC: 
Recent Labs 05/13/19 
3509 05/12/19 
6227 05/11/19 
0924 WBC 0.4* 1.6* 6.6 GRANS  --  13* 5* MONOS  --  68* 71* EOS  --  0* 0* ANEU  --  0.2* 0.3* ABL  --  0.3* 1.6 HGB 7.6* 6.7* 7.2* HCT 22.4* 20.5* 22.2*  
PLT 15* 35* 57* BMP: 
Recent Labs 05/13/19 
5502 05/12/19 
6780 05/11/19 
6108 CREA 0.80 0.90 0.78  
BUN 29* 30* 22  139 139  
K 4.5 4.4 5.0  
 106 109* CO2 24 22 24 AGAP 8 11 6*  
* 144* 155* LFTS: 
Recent Labs 05/13/19 
1489 05/12/19 
5242 05/11/19 
2188 TBILI 0.4 0.3 0.3 ALT 21 22 25 SGOT 7* 9* 14* AP 57 57 66  
TP 6.5 6.5 7.2 ALB 3.2 3.2 3.4 Microbiology:  
 
All Micro Results Procedure Component Value Units Date/Time CULTURE, BLOOD [[id number]] Collected:  05/07/19 2241 Order Status:  Completed Specimen:  Blood Updated:  05/12/19 0747 Special Requests: --     
  RIGHT Antecubital 
  
  Culture result: NO GROWTH 5 DAYS     
 CULTURE, BLOOD [[id number]] Collected:  05/05/19 1251 Order Status:  Completed Specimen:  Blood Updated:  05/10/19 8544 Special Requests: SINGLE PORT Culture result: NO GROWTH 5 DAYS     
 CULTURE, BLOOD [[id number]] Collected:  05/05/19 1309 Order Status:  Completed Specimen:  Blood Updated:  05/10/19 6397 Special Requests: --     
  RIGHT Antecubital 
  
  Culture result: NO GROWTH 5 DAYS     
 CULTURE, URINE [[id number]] Collected:  05/07/19 2321 Order Status:  Completed Specimen:  Urine from Clean catch Updated:  05/10/19 2467 Special Requests: NO SPECIAL REQUESTS Culture result: NO GROWTH 2 DAYS Imaging:  
Reviewed: none pertinent to concern for infection Signed By: Araceli Castillo NP May 13, 2019

## 2019-05-13 NOTE — INTERDISCIPLINARY ROUNDS
Interdisciplinary rounds with charge nurse, provider,  and . Presenting Problem: AML - chemo Daily Goal chemo, - day 5 of 7+3mouth care Expected Discharge Date: after recovery Expected Discharge Needs: none Patient/Family Concerns addressed

## 2019-05-13 NOTE — PROGRESS NOTES
Chart screened by  for discharge planning. No needs identified at this time. Please consult  if any new issues arise

## 2019-05-13 NOTE — PROGRESS NOTES
0643-Bedside report received from Cindra Boas, RN. Resting in bed. No needs voiced. No s/s of acute distress. 1810-END OF SHIFT NOTE: 
Pt's VSS and is in no acute distress. Pt on C1D5 of 7+3 and doing well. Intake/Output 05/13 0701 - 05/13 1900 In: 150 [P.O.:150] Out: 1150 [DRUYF:9784] Voiding: YES Catheter: NO 
Drain:   
 
 
Stool:  0 occurrences. Stool Assessment Stool Color: Alfonza Senters (05/11/19 1606) Stool Appearance: Formed (05/11/19 1606) Stool Amount: Small (05/11/19 1606) Stool Source/Status: Rectum (05/11/19 1606) Emesis:  0 occurrences. VITAL SIGNS Patient Vitals for the past 12 hrs: 
 Temp Pulse Resp BP SpO2  
05/13/19 1516 98.4 °F (36.9 °C) (!) 58 18 173/71 96 % 05/13/19 1120 98.3 °F (36.8 °C) (!) 51 16 156/70 96 % 05/13/19 0754  (!) 47 18 138/48 97 % Pain Assessment Pain 1 Pain Scale 1: Numeric (0 - 10) (05/13/19 1407) Pain Intensity 1: 0 (05/13/19 1407) Patient Stated Pain Goal: 0 (05/10/19 2045) Ambulating Yes Mackenzie Yulisa Rai 1841-Bedside shift change report given to Lily Meng RN (oncoming nurse) by Derrek Montoya RN (offgoing nurse). Report included the following information SBAR, Kardex, Intake/Output, MAR and Recent Results.

## 2019-05-13 NOTE — PROGRESS NOTES
[facility] Hematology & Oncology Inpatient Hematology / Oncology Progress Note Admission Date: 2019 10:53 AM 
Reason for Admission/Hospital Course: Admission for antineoplastic chemotherapy [Z51.11] 24 Hour Events: VSS/afebrile Day 5 7+3 College friends and spouse at bedside No new complaints ROS: 
Constitutional:  negative for fever, chills, weakness, malaise, fatigue. CV:  negative for chest pain, palpitations, edema. Respiratory: negative for dyspnea, cough, wheezing. GI: negative for nausea, abdominal pain, diarrhea. 10 point review of systems is otherwise negative with the exception of the elements mentioned above in the HPI. No Known Allergies OBJECTIVE: 
Patient Vitals for the past 8 hrs: 
 BP Temp Pulse Resp SpO2  
19 1120 156/70 98.3 °F (36.8 °C) (!) 51 16 96 % 19 0754 138/48  (!) 47 18 97 % Temp (24hrs), Av °F (36.7 °C), Min:97.4 °F (36.3 °C), Max:98.5 °F (36.9 °C) 
 
 0701 -  1900 In: -  
Out: 750 [Urine:750] Physical Exam: 
Constitutional: Well developed, well nourished female in no acute distress, sitting comfortably in the hospital bedside chair HEENT: Normocephalic and atraumatic. Oropharynx is clear, mucous membranes are moist. Extraocular muscles are intact. Sclerae anicteric. Skin Warm and dry. No rash noted. No erythema. No pallor. Respiratory Lungs are clear to auscultation bilaterally without wheezes, rales or rhonchi, normal air exchange without accessory muscle use. CVS Normal rate, regular rhythm and normal S1 and S2. No murmurs, gallops, or rubs. Abdomen Soft, nontender and nondistended, normoactive bowel sounds. Neuro Grossly nonfocal with no obvious sensory or motor deficits. MSK Normal range of motion in general. BLE edema Psych Appropriate mood and affect. Labs: 
   
Recent Labs 19 
7539 19 
5879 19 
9774 WBC 0.4* 1.6* 6.6  
RBC 2.37* 2.10* 2.27* HGB 7.6* 6.7* 7.2* HCT 22.4* 20.5* 22.2*  
MCV 94.5 97.6 97.8 MCH 32.1 31.9 31.7 MCHC 33.9 32.7 32.4  
RDW 15.5* 16.3* 17.0*  
PLT 15* 35* 57* GRANS  --  13* 5*  
LYMPH  --  19 24 MONOS  --  68* 71* EOS  --  0* 0*  
BASOS  --  0 0 IG  --  0 0  
DF MANUAL AUTOMATED AUTOMATED ANEU  --  0.2* 0.3* ABL  --  0.3* 1.6 ABM  --  1.1 4.7* ALEKSANDR  --  0.0 0.0 ABB  --  0.0 0.0 AIG  --  0.0 0.0 Recent Labs 05/13/19 2065 05/12/19 
7588 05/11/19 
9185  139 139  
K 4.5 4.4 5.0  
 106 109* CO2 24 22 24 AGAP 8 11 6*  
* 144* 155* BUN 29* 30* 22 CREA 0.80 0.90 0.78 GFRAA >60 >60 >60 GFRNA >60 >60 >60  
CA 8.8 8.6 9.0 SGOT 7* 9* 14* AP 57 57 66  
TP 6.5 6.5 7.2 ALB 3.2 3.2 3.4 GLOB 3.3 3.3 3.8* AGRAT 1.0* 1.0* 0.9* MG 2.7* 2.8* 2.5* PHOS 4.9* 5.4* 4.7* Imaging: 
 
Medications: 
Current Facility-Administered Medications Medication Dose Route Frequency  psyllium husk-aspartame (METAMUCIL FIBER) packet 1 Packet  1 Packet Oral BID PRN  
 0.9% sodium chloride infusion 250 mL  250 mL IntraVENous PRN  polyethylene glycol (MIRALAX) packet 17 g  17 g Oral DAILY PRN  
 levoFLOXacin (LEVAQUIN) tablet 500 mg  500 mg Oral ACB  ondansetron (ZOFRAN) injection 8 mg  8 mg IntraVENous Q8H  
 dexamethasone (DECADRON) injection 10 mg  10 mg IntraVENous Q24H  
 LORazepam (ATIVAN) injection 0.5 mg  0.5 mg IntraVENous Q6H PRN  
 cytarabine (CYTOSAR) 205 mg in 0.9% sodium chloride 250 mL chemo infusion  100 mg/m2 (Treatment Plan Recorded) IntraVENous Q24H  
 acetaminophen (TYLENOL) tablet 650 mg  650 mg Oral PRN  
 diphenhydrAMINE (BENADRYL) capsule 25 mg  25 mg Oral PRN  potassium chloride (K-DUR, KLOR-CON) SR tablet 20 mEq  20 mEq Oral BID  acetaminophen/diphenhydrAMINE (TYLENOL PM EXT STR) 500/25 mg   Oral QHS  acyclovir (ZOVIRAX) tablet 400 mg  400 mg Oral BID  allopurinol (ZYLOPRIM) tablet 300 mg  300 mg Oral DAILY  calcium carbonate (OS-CHRIS) tablet 500 mg [elemental]  500 mg Oral TID WITH MEALS  escitalopram oxalate (LEXAPRO) tablet 10 mg  10 mg Oral DAILY  fluconazole (DIFLUCAN) tablet 200 mg  200 mg Oral DAILY  lidocaine-prilocaine (EMLA) 2.5-2.5 % cream   Topical PRN  
 LORazepam (ATIVAN) tablet 1 mg  1 mg Oral QHS  ondansetron (ZOFRAN ODT) tablet 8 mg  8 mg Oral Q8H PRN  pravastatin (PRAVACHOL) tablet 10 mg  10 mg Oral QHS  vit C-E-zinc cit-lutein-zeaxan 50 mg-15 unit- 4.5 mg-2.5 mg chew (OCU-ABDOUL) 1 Tab (Patient Supplied)  1 Tab Oral DAILY  ergocalciferol capsule 50,000 Units  50,000 Units Oral every Wednesday ASSESSMENT: 
 
Problem List  Date Reviewed: 4/30/2019 Codes Class Noted Acute myeloid leukemia not having achieved remission ([facility] ICD-10-CM: C92.00 ICD-9-CM: 205.00  5/9/2019 Admission for antineoplastic chemotherapy ICD-10-CM: Z51.11 ICD-9-CM: V58.11  5/5/2019 AML (acute myeloblastic leukemia) ([facility] ICD-10-CM: C92.00 ICD-9-CM: 205.00  4/28/2019 Weakness generalized ICD-10-CM: R53.1 ICD-9-CM: 780.79  4/28/2019 Pancytopenia ([facility] ICD-10-CM: H91.126 ICD-9-CM: 284.19  4/28/2019 Thrombocytopenia ([facility] ICD-10-CM: D69.6 ICD-9-CM: 287.5  4/27/2019 PLAN: 
AML FLT 3+ 
5/6  BMbx planned for Tuesday then wait for [facility] from port results to determine start date of  cycle one 7 + 3 with Midostaurin day 8-21. Platelets will need to be at least 50k for bx tomorrow 5/7. BMbx today-platelets prior. Also needed prbc today for hgb 6.9. 
5/08 Start 7+3 when afebrile per Dr. Vinay Lakhani. 5/09 Day 1 of 7+3. Monitor TLS labs. 5/10 Day 2 7+3. Tolerating well. 5/13 Day 5 7+3. Midostaurin on the way. 
 
  
Possible port infection 5/5 Day one vanc/cefe. Follow cultures. Do not use port. May need to have Echo if cultures positive. 5/6 BCNTD. Appears improved today. Continue to monitor. No fevers or other symptoms. 5/7 Day 3 vanc/cefe. Site appears even better today. 5/8 Does not appear infected. Site bruised. 5/9 BCx negative. Per ID okay to start treatment. 5/13 Completed 7 days Vanc/cef. Now on levaquin per ID. Neutropenic Fever 05/05 Pan-culture negative. On Vancomycin, Maxipime 05/08 Febrile overnight up to 102.5. Repeat cultures x 1. Continue antibiotics. Likely disease related. Consult ID for clearance. 05/09 BC remain negative. No fevers 48 hours. Continue vanc/cefe. 05/13 now only on LVQ per ID Alexander SOPs Supportive care ACV/Diflucan 
LVQ dc'd while on cefe and vanc Goals and plan of care reviewed with the patient. All questions answered to the best of our ability. ARMANDO Arredondo [address] [place] [address] Office : [phone] Fax : [phone] Attending Addendum: 
Patient seen with NP. Pt with newly diagnosed AML FLT3+ on 7+3 D5. Neoadjuvant course complicated by febrile neutropenia treated with vanc/cef and pt remains on LVQ. Today, she is feeling fatigued but otherwise well. Friends and  at bedside. I personally performed a face to face diagnostic evaluation on this patient. My findings are as follows: A&ox3, lungs clear, heart regular, abdomen benign and no LE edema. Midostaurin approved and per pt on way to be delivered in a couple of days. Plan for start on D8-21. TLS labs monitored. C/w supportive care and transfuse per protocol. I have reviewed and agree with the care plan. Marlo Joy MD 
[facility] Hematology and Oncology [address] [place] [address] Office : [phone] Fax : [phone]

## 2019-05-13 NOTE — PROGRESS NOTES
Nutrition LOS Note: day 7 Assessment Diet: DIET REGULAR Food,Nutrition, and Pertinent History: Pt known to me from previous admission. H/O DLBCL and admitted for chemotherapy treatment. Pt endorses a good appetite and states that the portions served are much larger than what she is used to having at home. She has no c/o barriers to meal intake at this time. Anthropometrics: Height: 5' 6\" (167.6 cm), Weight Source: Standing scale (comment), Weight: 91 kg (200 lb 9.6 oz), Body mass index is 32.38 kg/m². BMI class of overweight. Edema: 1+ pitting to BLEs Macronutrient Needs: 
· EER:  7557-9629 kcal /day (15-20 kcal/kg listed BW) · EPR:   grams protein/day (1-1.2 grams/kg listed BW) Intake/Comparative Standards:Average intake for past 7 day(s)/19 recorded meal(s): 89%. This potentially meets ~100% of kcal and ~94% of protein needs Nutrition Diagnosis: No nutrition diagnosis at this time Intervention:  
Meals and snacks: Continue current diet. Discharge nutrition plan: Too soon to determine. Aster Pimentel [address], [address] [place] [address], [address] [place] [address], [phone]

## 2019-05-14 LAB
ALBUMIN SERPL-MCNC: 3.4 G/DL (ref 3.2–4.6)
ALBUMIN/GLOB SERPL: 1.1 {RATIO} (ref 1.2–3.5)
ALP SERPL-CCNC: 56 U/L (ref 50–136)
ALT SERPL-CCNC: 15 U/L (ref 12–65)
ANION GAP SERPL CALC-SCNC: 8 MMOL/L (ref 7–16)
AST SERPL-CCNC: 5 U/L (ref 15–37)
ATRIAL RATE: 48 BPM
BILIRUB SERPL-MCNC: 0.7 MG/DL (ref 0.2–1.1)
BUN SERPL-MCNC: 28 MG/DL (ref 8–23)
CALCIUM SERPL-MCNC: 8.6 MG/DL (ref 8.3–10.4)
CALCULATED P AXIS, ECG09: 66 DEGREES
CALCULATED R AXIS, ECG10: -29 DEGREES
CALCULATED T AXIS, ECG11: 53 DEGREES
CHLORIDE SERPL-SCNC: 106 MMOL/L (ref 98–107)
CO2 SERPL-SCNC: 23 MMOL/L (ref 21–32)
CREAT SERPL-MCNC: 0.82 MG/DL (ref 0.6–1)
DIAGNOSIS, 93000: NORMAL
DIFFERENTIAL METHOD BLD: ABNORMAL
ERYTHROCYTE [DISTWIDTH] IN BLOOD BY AUTOMATED COUNT: 14.6 % (ref 11.9–14.6)
GLOBULIN SER CALC-MCNC: 3.1 G/DL (ref 2.3–3.5)
GLUCOSE SERPL-MCNC: 149 MG/DL (ref 65–100)
HCT VFR BLD AUTO: 21.5 % (ref 35.8–46.3)
HGB BLD-MCNC: 7.4 G/DL (ref 11.7–15.4)
LDH SERPL L TO P-CCNC: 210 U/L (ref 110–210)
MAGNESIUM SERPL-MCNC: 2.8 MG/DL (ref 1.8–2.4)
MCH RBC QN AUTO: 32.5 PG (ref 26.1–32.9)
MCHC RBC AUTO-ENTMCNC: 34.4 G/DL (ref 31.4–35)
MCV RBC AUTO: 94.3 FL (ref 79.6–97.8)
NRBC # BLD: 0 K/UL (ref 0–0.2)
P-R INTERVAL, ECG05: 176 MS
PHOSPHATE SERPL-MCNC: 4.6 MG/DL (ref 2.3–3.7)
PLATELET # BLD AUTO: 9 K/UL (ref 150–450)
PLATELET COMMENTS,PCOM: ABNORMAL
PMV BLD AUTO: 9.7 FL (ref 9.4–12.3)
POTASSIUM SERPL-SCNC: 4.5 MMOL/L (ref 3.5–5.1)
PROT SERPL-MCNC: 6.5 G/DL (ref 6.3–8.2)
Q-T INTERVAL, ECG07: 458 MS
QRS DURATION, ECG06: 108 MS
QTC CALCULATION (BEZET), ECG08: 409 MS
RBC # BLD AUTO: 2.28 M/UL (ref 4.05–5.2)
RBC MORPH BLD: ABNORMAL
SODIUM SERPL-SCNC: 137 MMOL/L (ref 136–145)
URATE SERPL-MCNC: 5 MG/DL (ref 2.6–6)
VENTRICULAR RATE, ECG03: 48 BPM
WBC # BLD AUTO: 0.2 K/UL (ref 4.3–11.1)
WBC MORPH BLD: ABNORMAL

## 2019-05-14 PROCEDURE — 74011250637 HC RX REV CODE- 250/637: Performed by: NURSE PRACTITIONER

## 2019-05-14 PROCEDURE — 36591 DRAW BLOOD OFF VENOUS DEVICE: CPT

## 2019-05-14 PROCEDURE — 74011250637 HC RX REV CODE- 250/637: Performed by: INTERNAL MEDICINE

## 2019-05-14 PROCEDURE — 36430 TRANSFUSION BLD/BLD COMPNT: CPT

## 2019-05-14 PROCEDURE — 85025 COMPLETE CBC W/AUTO DIFF WBC: CPT

## 2019-05-14 PROCEDURE — 80053 COMPREHEN METABOLIC PANEL: CPT

## 2019-05-14 PROCEDURE — 93005 ELECTROCARDIOGRAM TRACING: CPT | Performed by: NURSE PRACTITIONER

## 2019-05-14 PROCEDURE — 84100 ASSAY OF PHOSPHORUS: CPT

## 2019-05-14 PROCEDURE — 84550 ASSAY OF BLOOD/URIC ACID: CPT

## 2019-05-14 PROCEDURE — 65270000029 HC RM PRIVATE

## 2019-05-14 PROCEDURE — 83735 ASSAY OF MAGNESIUM: CPT

## 2019-05-14 PROCEDURE — 99232 SBSQ HOSP IP/OBS MODERATE 35: CPT | Performed by: INTERNAL MEDICINE

## 2019-05-14 PROCEDURE — 83615 LACTATE (LD) (LDH) ENZYME: CPT

## 2019-05-14 PROCEDURE — P9037 PLATE PHERES LEUKOREDU IRRAD: HCPCS

## 2019-05-14 PROCEDURE — 77030039270 HC TU BLD FLTR CARD -A

## 2019-05-14 PROCEDURE — 74011250636 HC RX REV CODE- 250/636: Performed by: INTERNAL MEDICINE

## 2019-05-14 PROCEDURE — 86644 CMV ANTIBODY: CPT

## 2019-05-14 PROCEDURE — 77030020256 HC SOL INJ NACL 0.9%  500ML

## 2019-05-14 RX ADMIN — CYTARABINE 205 MG: 100 INJECTION, SOLUTION INTRATHECAL; INTRAVENOUS; SUBCUTANEOUS at 16:10

## 2019-05-14 RX ADMIN — Medication 500 MG: at 08:55

## 2019-05-14 RX ADMIN — ESCITALOPRAM OXALATE 10 MG: 10 TABLET ORAL at 08:55

## 2019-05-14 RX ADMIN — ONDANSETRON 8 MG: 2 INJECTION INTRAMUSCULAR; INTRAVENOUS at 05:20

## 2019-05-14 RX ADMIN — POTASSIUM CHLORIDE 20 MEQ: 20 TABLET, EXTENDED RELEASE ORAL at 17:13

## 2019-05-14 RX ADMIN — LEVOFLOXACIN 500 MG: 500 TABLET, FILM COATED ORAL at 06:33

## 2019-05-14 RX ADMIN — PRAVASTATIN SODIUM 10 MG: 20 TABLET ORAL at 21:54

## 2019-05-14 RX ADMIN — ALLOPURINOL 300 MG: 300 TABLET ORAL at 08:55

## 2019-05-14 RX ADMIN — POLYETHYLENE GLYCOL 3350 17 G: 17 POWDER, FOR SOLUTION ORAL at 09:01

## 2019-05-14 RX ADMIN — FLUCONAZOLE 200 MG: 100 TABLET ORAL at 08:55

## 2019-05-14 RX ADMIN — Medication 500 MG: at 17:13

## 2019-05-14 RX ADMIN — Medication 10 ML: at 21:54

## 2019-05-14 RX ADMIN — LORAZEPAM 1 MG: 1 TABLET ORAL at 21:54

## 2019-05-14 RX ADMIN — ONDANSETRON 8 MG: 2 INJECTION INTRAMUSCULAR; INTRAVENOUS at 21:54

## 2019-05-14 RX ADMIN — Medication 10 ML: at 05:20

## 2019-05-14 RX ADMIN — ACYCLOVIR 400 MG: 800 TABLET ORAL at 08:55

## 2019-05-14 RX ADMIN — DEXAMETHASONE SODIUM PHOSPHATE 10 MG: 10 INJECTION INTRAMUSCULAR; INTRAVENOUS at 15:05

## 2019-05-14 RX ADMIN — ACYCLOVIR 400 MG: 800 TABLET ORAL at 17:13

## 2019-05-14 RX ADMIN — Medication 500 MG: at 11:30

## 2019-05-14 RX ADMIN — DIPHENHYDRAMINE HYDROCHLORIDE 25 MG: 25 CAPSULE ORAL at 10:13

## 2019-05-14 RX ADMIN — POTASSIUM CHLORIDE 20 MEQ: 20 TABLET, EXTENDED RELEASE ORAL at 08:55

## 2019-05-14 RX ADMIN — ACETAMINOPHEN 650 MG: 325 TABLET, FILM COATED ORAL at 10:13

## 2019-05-14 RX ADMIN — ACETAMINOPHEN: 500 TABLET, FILM COATED ORAL at 21:54

## 2019-05-14 RX ADMIN — ONDANSETRON 8 MG: 2 INJECTION INTRAMUSCULAR; INTRAVENOUS at 15:05

## 2019-05-14 NOTE — PROGRESS NOTES
Interdisciplinary rounds with charge nurse, provider,  and . 
  
Presenting Problem: AML - chemo Daily Goal: Day 6 of 7+3; mouth care Expected Discharge Date: after recovery Expected Discharge Needs: none Patient/Family Concerns addressed

## 2019-05-14 NOTE — PROGRESS NOTES
END OF SHIFT NOTE: 
 
Intake/Output 05/13 1901 - 05/14 0700 In: 641.6 [P.O.:360; I.V.:281.6] Out: 2100 [Urine:2100] Voiding: YES Catheter: NO 
Drain:   
 
 
 
 
 
Stool:  0 occurrences. Stool Assessment Stool Color: Rollen Nilton (05/11/19 1606) Stool Appearance: Formed (05/11/19 1606) Stool Amount: Small (05/11/19 1606) Stool Source/Status: Rectum (05/11/19 1606) Emesis:  0 occurrences. VITAL SIGNS Patient Vitals for the past 12 hrs: 
 Temp Pulse Resp BP SpO2  
05/14/19 0339 98.2 °F (36.8 °C) (!) 55 18 150/64 97 % 05/13/19 2309 98 °F (36.7 °C) (!) 57 18 141/61 96 % 05/13/19 1920 98.8 °F (37.1 °C) 61 18 154/59 98 % Pain Assessment Pain 1 Pain Scale 1: Numeric (0 - 10) (05/14/19 0135) Pain Intensity 1: 0 (05/14/19 0135) Patient Stated Pain Goal: 0 (05/14/19 0135) Ambulating Yes Additional Information:  
Continues w/ 24 hour chemo Cytarabine via port;good blood return noted. Monitored per chemo protocol;teachings reinforced;compliant. No new complaints. Shift report given to oncoming nurse Gregoria RN at the bedside.  
 
Nuzhat Mann RN

## 2019-05-14 NOTE — PROGRESS NOTES
[facility] Hematology & Oncology Inpatient Hematology / Oncology Progress Note Admission Date: 2019 10:53 AM 
Reason for Admission/Hospital Course: Admission for antineoplastic chemotherapy [Z51.11] 24 Hour Events: VSS/afebrile Day 6 7+3 Ongoing bradycardia-EKG No new complaints ROS: 
Constitutional:  negative for fever, chills, weakness, malaise, fatigue. CV:  negative for chest pain, palpitations, edema. Respiratory: negative for dyspnea, cough, wheezing. GI: negative for nausea, abdominal pain, diarrhea. 10 point review of systems is otherwise negative with the exception of the elements mentioned above in the HPI. No Known Allergies OBJECTIVE: 
Patient Vitals for the past 8 hrs: 
 BP Temp Pulse Resp SpO2  
19 1130 138/61 98.1 °F (36.7 °C) (!) 49 18 96 % 19 1056 113/40 98.8 °F (37.1 °C) (!) 49 18 97 % 19 1036 134/65 98.5 °F (36.9 °C) (!) 57 18 97 % 19 0749 136/53 98.4 °F (36.9 °C) (!) 54 18 97 % Temp (24hrs), Av.4 °F (36.9 °C), Min:98 °F (36.7 °C), Max:98.8 °F (37.1 °C) 
 
 0701 -  1900 In: 272.3 [I.V.:27.3] Out: 500 [Urine:500] Physical Exam: 
Constitutional: Well developed, well nourished female in no acute distress, sitting comfortably on side of bed HEENT: Normocephalic and atraumatic. Oropharynx is clear, mucous membranes are moist. Extraocular muscles are intact. Sclerae anicteric. Skin Warm and dry. No rash noted. No erythema. No pallor. Respiratory Lungs are clear to auscultation bilaterally without wheezes, rales or rhonchi, normal air exchange without accessory muscle use. CVS Bradycardia, regular rhythm and normal S1 and S2. No murmurs, gallops, or rubs. Abdomen Soft, nontender and nondistended, normoactive bowel sounds. Neuro Grossly nonfocal with no obvious sensory or motor deficits. MSK Normal range of motion in general. BLE edema improved Psych Appropriate mood and affect. Labs: 
   
Recent Labs 05/14/19 
2648 05/13/19 
6762 05/12/19 
4205 WBC 0.2* 0.4* 1.6*  
RBC 2.28* 2.37* 2.10* HGB 7.4* 7.6* 6.7* HCT 21.5* 22.4* 20.5* MCV 94.3 94.5 97.6 MCH 32.5 32.1 31.9 MCHC 34.4 33.9 32.7 RDW 14.6 15.5* 16.3*  
PLT 9* 15* 35* GRANS  --   --  13* LYMPH  --   --  19 MONOS  --   --  68* EOS  --   --  0* BASOS  --   --  0 IG  --   --  0  
DF AUTOMATED MANUAL AUTOMATED ANEU  --   --  0.2* ABL  --   --  0.3* ABM  --   --  1.1 ALEKSANDR  --   --  0.0 ABB  --   --  0.0 AIG  --   --  0.0 Recent Labs 05/14/19 
2183 05/13/19 
1835 05/12/19 
4396  138 139  
K 4.5 4.5 4.4  
 106 106 CO2 23 24 22 AGAP 8 8 11 * 152* 144* BUN 28* 29* 30* CREA 0.82 0.80 0.90 GFRAA >60 >60 >60 GFRNA >60 >60 >60  
CA 8.6 8.8 8.6 SGOT 5* 7* 9* AP 56 57 57  
TP 6.5 6.5 6.5 ALB 3.4 3.2 3.2 GLOB 3.1 3.3 3.3 AGRAT 1.1* 1.0* 1.0*  
MG 2.8* 2.7* 2.8* PHOS 4.6* 4.9* 5.4* Imaging: 
 
Medications: 
Current Facility-Administered Medications Medication Dose Route Frequency  psyllium husk-aspartame (METAMUCIL FIBER) packet 1 Packet  1 Packet Oral BID PRN  
 central line flush (saline) syringe 10 mL  10 mL InterCATHeter PRN  
 heparin (porcine) pf 300 Units  300 Units InterCATHeter PRN  
 0.9% sodium chloride infusion 250 mL  250 mL IntraVENous PRN  polyethylene glycol (MIRALAX) packet 17 g  17 g Oral DAILY PRN  
 levoFLOXacin (LEVAQUIN) tablet 500 mg  500 mg Oral ACB  ondansetron (ZOFRAN) injection 8 mg  8 mg IntraVENous Q8H  
 dexamethasone (DECADRON) injection 10 mg  10 mg IntraVENous Q24H  
 LORazepam (ATIVAN) injection 0.5 mg  0.5 mg IntraVENous Q6H PRN  
 cytarabine (CYTOSAR) 205 mg in 0.9% sodium chloride 250 mL chemo infusion  100 mg/m2 (Treatment Plan Recorded) IntraVENous Q24H  
 acetaminophen (TYLENOL) tablet 650 mg  650 mg Oral PRN  
 diphenhydrAMINE (BENADRYL) capsule 25 mg  25 mg Oral PRN  
  potassium chloride (K-DUR, KLOR-CON) SR tablet 20 mEq  20 mEq Oral BID  acetaminophen/diphenhydrAMINE (TYLENOL PM EXT STR) 500/25 mg   Oral QHS  acyclovir (ZOVIRAX) tablet 400 mg  400 mg Oral BID  allopurinol (ZYLOPRIM) tablet 300 mg  300 mg Oral DAILY  calcium carbonate (OS-CHRIS) tablet 500 mg [elemental]  500 mg Oral TID WITH MEALS  escitalopram oxalate (LEXAPRO) tablet 10 mg  10 mg Oral DAILY  fluconazole (DIFLUCAN) tablet 200 mg  200 mg Oral DAILY  lidocaine-prilocaine (EMLA) 2.5-2.5 % cream   Topical PRN  
 LORazepam (ATIVAN) tablet 1 mg  1 mg Oral QHS  ondansetron (ZOFRAN ODT) tablet 8 mg  8 mg Oral Q8H PRN  pravastatin (PRAVACHOL) tablet 10 mg  10 mg Oral QHS  vit C-E-zinc cit-lutein-zeaxan 50 mg-15 unit- 4.5 mg-2.5 mg chew (OCU-ABDOUL) 1 Tab (Patient Supplied)  1 Tab Oral DAILY  ergocalciferol capsule 50,000 Units  50,000 Units Oral every Wednesday ASSESSMENT: 
 
Problem List  Date Reviewed: 4/30/2019 Codes Class Noted Acute myeloid leukemia not having achieved remission ([facility] ICD-10-CM: C92.00 ICD-9-CM: 205.00  5/9/2019 Admission for antineoplastic chemotherapy ICD-10-CM: Z51.11 ICD-9-CM: V58.11  5/5/2019 AML (acute myeloblastic leukemia) ([facility] ICD-10-CM: C92.00 ICD-9-CM: 205.00  4/28/2019 Weakness generalized ICD-10-CM: R53.1 ICD-9-CM: 780.79  4/28/2019 Pancytopenia ([facility] ICD-10-CM: R99.214 ICD-9-CM: 284.19  4/28/2019 Thrombocytopenia ([facility] ICD-10-CM: D69.6 ICD-9-CM: 287.5  4/27/2019 PLAN: 
AML FLT 3+ 
5/6  BMbx planned for Tuesday then wait for [facility] from port results to determine start date of  cycle one 7 + 3 with Midostaurin day 8-21. Platelets will need to be at least 50k for bx tomorrow 5/7. BMbx today-platelets prior. Also needed prbc today for hgb 6.9. 
5/08 Start 7+3 when afebrile per Dr. Anna Proper. 5/09 Day 1 of 7+3. Monitor TLS labs. 5/10 Day 2 7+3. Tolerating well. 5/13 Day 5 7+3. Midostaurin on the way. 
 
  
Possible port infection 5/5 Day one vanc/cefe. Follow cultures. Do not use port. May need to have Echo if cultures positive. 5/6 BCNTD. Appears improved today. Continue to monitor. No fevers or other symptoms. 5/7 Day 3 vanc/cefe. Site appears even better today. 5/8 Does not appear infected. Site bruised. 5/9 BCx negative. Per ID okay to start treatment. 5/13 Completed 7 days Vanc/cef. Now on levaquin per ID. Neutropenic Fever 05/05 Pan-culture negative. On Vancomycin, Maxipime 05/08 Febrile overnight up to 102.5. Repeat cultures x 1. Continue antibiotics. Likely disease related. Consult ID for clearance. 05/09 BC remain negative. No fevers 48 hours. Continue vanc/cefe. 05/13 now only on LVQ per ID Bradycardia 5/14 EKG with no significant change. Alexander SOPs Supportive care ACV/Diflucan 
LVQ dc'd while on cefe and vanc Goals and plan of care reviewed with the patient. All questions answered to the best of our ability. Indu Canales NP 
 
[address] Office : [phone] Fax : [phone] Attending Addendum: 
Patient seen with NP. Pt with newly diagnosed AML FLT3+ on 7+3 D6. Pre tx course complicated by febrile neutropenia treated with vanc/cef and pt remains on LVQ. Today, she is feeling fatigued but otherwise well.  at bedside. She is feeling quite well today. I personally performed a face to face diagnostic evaluation on this patient. My findings are as follows: A&ox3, lungs clear, heart regular, abdomen benign and ankle edema better today. Midostaurin approved and per pt on way to be delivered in a couple of days. Plan for start on D8-21. TLS labs monitored. Sinus irish on EKG - monitoring - asymptomatic. No qtc prolongation.   C/w supportive care and transfuse per protocol.    
  
I have reviewed and agree with the care plan.     
  
  
 
  
 Floyd Headley MD 
[facility] Hematology and Oncology [address] [place] [address] Office : [phone] Fax : [phone]

## 2019-05-14 NOTE — PROGRESS NOTES
Problem: Falls - Risk of 
Goal: *Absence of Falls Description Document Denis Hardy Fall Risk and appropriate interventions in the flowsheet. Outcome: Progressing Towards Goal 
  
Problem: Discharge Planning Goal: *Discharge to safe environment Outcome: Progressing Towards Goal 
  
Problem: Discharge Planning Goal: *Knowledge of medication management Outcome: Progressing Towards Goal 
  
Problem: Discharge Planning Goal: *Knowledge of discharge instructions Outcome: Progressing Towards Goal

## 2019-05-15 ENCOUNTER — DOCUMENTATION ONLY (OUTPATIENT)
Dept: HEMATOLOGY | Age: 74
End: 2019-05-15

## 2019-05-15 LAB
ALBUMIN SERPL-MCNC: 3.4 G/DL (ref 3.2–4.6)
ALBUMIN/GLOB SERPL: 1.1 {RATIO} (ref 1.2–3.5)
ALP SERPL-CCNC: 52 U/L (ref 50–136)
ALT SERPL-CCNC: 17 U/L (ref 12–65)
ANION GAP SERPL CALC-SCNC: 8 MMOL/L (ref 7–16)
AST SERPL-CCNC: 7 U/L (ref 15–37)
BILIRUB SERPL-MCNC: 0.7 MG/DL (ref 0.2–1.1)
BLD PROD TYP BPU: NORMAL
BPU ID: NORMAL
BUN SERPL-MCNC: 27 MG/DL (ref 8–23)
CALCIUM SERPL-MCNC: 8.9 MG/DL (ref 8.3–10.4)
CHLORIDE SERPL-SCNC: 105 MMOL/L (ref 98–107)
CO2 SERPL-SCNC: 24 MMOL/L (ref 21–32)
CREAT SERPL-MCNC: 0.79 MG/DL (ref 0.6–1)
DIFFERENTIAL METHOD BLD: ABNORMAL
ERYTHROCYTE [DISTWIDTH] IN BLOOD BY AUTOMATED COUNT: 14.3 % (ref 11.9–14.6)
GLOBULIN SER CALC-MCNC: 3.1 G/DL (ref 2.3–3.5)
GLUCOSE SERPL-MCNC: 136 MG/DL (ref 65–100)
HCT VFR BLD AUTO: 21.4 % (ref 35.8–46.3)
HGB BLD-MCNC: 7.3 G/DL (ref 11.7–15.4)
LDH SERPL L TO P-CCNC: 201 U/L (ref 110–210)
MAGNESIUM SERPL-MCNC: 2.5 MG/DL (ref 1.8–2.4)
MCH RBC QN AUTO: 31.9 PG (ref 26.1–32.9)
MCHC RBC AUTO-ENTMCNC: 34.1 G/DL (ref 31.4–35)
MCV RBC AUTO: 93.4 FL (ref 79.6–97.8)
NRBC # BLD: 0 K/UL (ref 0–0.2)
PHOSPHATE SERPL-MCNC: 4.9 MG/DL (ref 2.3–3.7)
PLATELET # BLD AUTO: 36 K/UL (ref 150–450)
PLATELET COMMENTS,PCOM: ABNORMAL
PMV BLD AUTO: 8.7 FL (ref 9.4–12.3)
POTASSIUM SERPL-SCNC: 4.5 MMOL/L (ref 3.5–5.1)
PROT SERPL-MCNC: 6.5 G/DL (ref 6.3–8.2)
RBC # BLD AUTO: 2.29 M/UL (ref 4.05–5.2)
RBC MORPH BLD: ABNORMAL
SODIUM SERPL-SCNC: 137 MMOL/L (ref 136–145)
STATUS OF UNIT,%ST: NORMAL
UNIT DIVISION, %UDIV: 0
URATE SERPL-MCNC: 4.3 MG/DL (ref 2.6–6)
WBC # BLD AUTO: 0.1 K/UL (ref 4.3–11.1)
WBC MORPH BLD: ABNORMAL

## 2019-05-15 PROCEDURE — 99232 SBSQ HOSP IP/OBS MODERATE 35: CPT | Performed by: INTERNAL MEDICINE

## 2019-05-15 PROCEDURE — 74011250637 HC RX REV CODE- 250/637: Performed by: NURSE PRACTITIONER

## 2019-05-15 PROCEDURE — 85025 COMPLETE CBC W/AUTO DIFF WBC: CPT

## 2019-05-15 PROCEDURE — 74011250637 HC RX REV CODE- 250/637: Performed by: INTERNAL MEDICINE

## 2019-05-15 PROCEDURE — 80053 COMPREHEN METABOLIC PANEL: CPT

## 2019-05-15 PROCEDURE — 74011250636 HC RX REV CODE- 250/636: Performed by: INTERNAL MEDICINE

## 2019-05-15 PROCEDURE — 84100 ASSAY OF PHOSPHORUS: CPT

## 2019-05-15 PROCEDURE — 84550 ASSAY OF BLOOD/URIC ACID: CPT

## 2019-05-15 PROCEDURE — 36591 DRAW BLOOD OFF VENOUS DEVICE: CPT

## 2019-05-15 PROCEDURE — 65270000029 HC RM PRIVATE

## 2019-05-15 PROCEDURE — 83735 ASSAY OF MAGNESIUM: CPT

## 2019-05-15 PROCEDURE — 83615 LACTATE (LD) (LDH) ENZYME: CPT

## 2019-05-15 PROCEDURE — 74011250636 HC RX REV CODE- 250/636: Performed by: NURSE PRACTITIONER

## 2019-05-15 RX ORDER — CALCIUM ACETATE 667 MG/1
1 CAPSULE ORAL
Status: DISCONTINUED | OUTPATIENT
Start: 2019-05-15 | End: 2019-05-20

## 2019-05-15 RX ORDER — MAG HYDROX/ALUMINUM HYD/SIMETH 200-200-20
30 SUSPENSION, ORAL (FINAL DOSE FORM) ORAL
Status: DISCONTINUED | OUTPATIENT
Start: 2019-05-15 | End: 2019-06-07 | Stop reason: HOSPADM

## 2019-05-15 RX ORDER — MORPHINE SULFATE 2 MG/ML
2 INJECTION, SOLUTION INTRAMUSCULAR; INTRAVENOUS
Status: DISCONTINUED | OUTPATIENT
Start: 2019-05-15 | End: 2019-06-07 | Stop reason: HOSPADM

## 2019-05-15 RX ORDER — HYDROCODONE BITARTRATE AND ACETAMINOPHEN 5; 325 MG/1; MG/1
1 TABLET ORAL
Status: DISCONTINUED | OUTPATIENT
Start: 2019-05-15 | End: 2019-06-07 | Stop reason: HOSPADM

## 2019-05-15 RX ADMIN — Medication 5 ML: at 21:59

## 2019-05-15 RX ADMIN — POTASSIUM CHLORIDE 20 MEQ: 20 TABLET, EXTENDED RELEASE ORAL at 08:46

## 2019-05-15 RX ADMIN — ONDANSETRON 8 MG: 2 INJECTION INTRAMUSCULAR; INTRAVENOUS at 21:59

## 2019-05-15 RX ADMIN — ERGOCALCIFEROL 50000 UNITS: 1.25 CAPSULE ORAL at 06:07

## 2019-05-15 RX ADMIN — ONDANSETRON 8 MG: 2 INJECTION INTRAMUSCULAR; INTRAVENOUS at 15:07

## 2019-05-15 RX ADMIN — FLUCONAZOLE 200 MG: 100 TABLET ORAL at 08:46

## 2019-05-15 RX ADMIN — DEXAMETHASONE SODIUM PHOSPHATE 10 MG: 10 INJECTION INTRAMUSCULAR; INTRAVENOUS at 15:07

## 2019-05-15 RX ADMIN — ACYCLOVIR 400 MG: 800 TABLET ORAL at 08:46

## 2019-05-15 RX ADMIN — ONDANSETRON 8 MG: 2 INJECTION INTRAMUSCULAR; INTRAVENOUS at 06:07

## 2019-05-15 RX ADMIN — ALUMINUM HYDROXIDE, MAGNESIUM HYDROXIDE, AND SIMETHICONE 30 ML: 200; 200; 20 SUSPENSION ORAL at 15:11

## 2019-05-15 RX ADMIN — ALLOPURINOL 300 MG: 300 TABLET ORAL at 08:46

## 2019-05-15 RX ADMIN — CALCIUM ACETATE 667 MG: 667 CAPSULE ORAL at 17:09

## 2019-05-15 RX ADMIN — Medication 500 MG: at 11:24

## 2019-05-15 RX ADMIN — Medication 5 ML: at 17:09

## 2019-05-15 RX ADMIN — ESCITALOPRAM OXALATE 10 MG: 10 TABLET ORAL at 08:46

## 2019-05-15 RX ADMIN — ALUMINUM HYDROXIDE, MAGNESIUM HYDROXIDE, AND SIMETHICONE 30 ML: 200; 200; 20 SUSPENSION ORAL at 08:52

## 2019-05-15 RX ADMIN — PRAVASTATIN SODIUM 10 MG: 20 TABLET ORAL at 21:59

## 2019-05-15 RX ADMIN — HYDROCODONE BITARTRATE AND ACETAMINOPHEN 1 TABLET: 5; 325 TABLET ORAL at 17:36

## 2019-05-15 RX ADMIN — MORPHINE SULFATE 2 MG: 2 INJECTION, SOLUTION INTRAMUSCULAR; INTRAVENOUS at 22:00

## 2019-05-15 RX ADMIN — LEVOFLOXACIN 500 MG: 500 TABLET, FILM COATED ORAL at 06:07

## 2019-05-15 RX ADMIN — ACYCLOVIR 400 MG: 800 TABLET ORAL at 17:09

## 2019-05-15 RX ADMIN — POTASSIUM CHLORIDE 20 MEQ: 20 TABLET, EXTENDED RELEASE ORAL at 17:09

## 2019-05-15 RX ADMIN — Medication 500 MG: at 17:09

## 2019-05-15 RX ADMIN — CYTARABINE 205 MG: 100 INJECTION, SOLUTION INTRATHECAL; INTRAVENOUS; SUBCUTANEOUS at 15:54

## 2019-05-15 RX ADMIN — Medication 500 MG: at 08:45

## 2019-05-15 RX ADMIN — CALCIUM ACETATE 667 MG: 667 CAPSULE ORAL at 15:07

## 2019-05-15 NOTE — PROGRESS NOTES
END OF SHIFT NOTE: 
 
Intake/Output 05/14 1901 - 05/15 0700 In: 397 [P.O.:240; I.V.:157] Out: 6920 [EIMYO:0126] Voiding: YES Catheter: NO 
Drain:   
 
 
 
 
 
Stool:  0 occurrences. Stool Assessment Stool Color: Voncile Player (05/11/19 1606) Stool Appearance: Formed (05/11/19 1606) Stool Amount: Small (05/11/19 1606) Stool Source/Status: Rectum (05/11/19 1606) Emesis:  0 occurrences. VITAL SIGNS Patient Vitals for the past 12 hrs: 
 Temp Pulse Resp BP SpO2  
05/15/19 0001 98.4 °F (36.9 °C) (!) 50 18 146/72 97 % 05/14/19 1946 98.2 °F (36.8 °C) (!) 58 18 151/60 96 % 05/14/19 1614 98.3 °F (36.8 °C) (!) 55 18 157/72 95 % Pain Assessment Pain 1 Pain Scale 1: Numeric (0 - 10) (05/15/19 0020) Pain Intensity 1: 0 (05/15/19 0020) Patient Stated Pain Goal: 0 (05/15/19 0020) Ambulating Yes Additional Information:  
Continues with 24 hour chemo Cytarabine via port;good blood return noted. Monitored per chemo protocol. No new complaints. Remains afebrile;no bleeding. Shift report given to oncoming nurse Germania RN  at the bedside.  
 
Luis Armando Velazco RN

## 2019-05-15 NOTE — PROGRESS NOTES
[facility] Inpatient Hematology / Oncology Progress Note Admission Date: 2019 10:53 AM 
Reason for Admission/Hospital Course: Admission for antineoplastic chemotherapy [Z51.11] 24 Hour Events: VSS/afebrile Day 7 of 7+3 Complaint of acid reflux-relieved with Milanta this am/MMW prn ordered ROS: 
Constitutional:  negative for fever, chills, weakness, malaise, fatigue. CV:  negative for chest pain, palpitations, edema. Respiratory: negative for dyspnea, cough, wheezing. GI: negative for nausea, abdominal pain, diarrhea. 10 point review of systems is otherwise negative with the exception of the elements mentioned above in the HPI. No Known Allergies OBJECTIVE: 
Patient Vitals for the past 8 hrs: 
 BP Temp Pulse Resp SpO2  
05/15/19 0738 144/61 98.5 °F (36.9 °C) (!) 51 18 96 % 05/15/19 0417 144/60 98.7 °F (37.1 °C) 60 18 94 % Temp (24hrs), Av.4 °F (36.9 °C), Min:98.1 °F (36.7 °C), Max:98.8 °F (37.1 °C) 
 
05/15 0701 - 05/15 1900 In: 480 [P.O.:480] Out: - Physical Exam: 
Constitutional: Well developed, well nourished female in no acute distress, sitting comfortably on side of bed HEENT: Normocephalic and atraumatic. Oropharynx is clear, mucous membranes are moist. Extraocular muscles are intact. Sclerae anicteric. Skin Warm and dry. No rash noted. No erythema. No pallor. Respiratory Lungs are clear to auscultation bilaterally without wheezes, rales or rhonchi, normal air exchange without accessory muscle use. CVS Bradycardia, regular rhythm and normal S1 and S2. No murmurs, gallops, or rubs. Abdomen Soft, nontender and nondistended, normoactive bowel sounds. Neuro Grossly nonfocal with no obvious sensory or motor deficits. MSK Normal range of motion in general. BLE edema improved Psych Appropriate mood and affect. Labs: 
   
Recent Labs 05/15/19 
2769 19 
5696 19 
7078 WBC 0.1* 0.2* 0.4*  
 RBC 2.29* 2.28* 2.37* HGB 7.3* 7.4* 7.6* HCT 21.4* 21.5* 22.4* MCV 93.4 94.3 94.5 MCH 31.9 32.5 32.1 MCHC 34.1 34.4 33.9 RDW 14.3 14.6 15.5* PLT 36* 9* 15* DF AUTOMATED AUTOMATED MANUAL Recent Labs 05/15/19 
5589 05/14/19 
6324 05/13/19 
3154  137 138  
K 4.5 4.5 4.5  
 106 106 CO2 24 23 24 AGAP 8 8 8 * 149* 152* BUN 27* 28* 29* CREA 0.79 0.82 0.80 GFRAA >60 >60 >60 GFRNA >60 >60 >60  
CA 8.9 8.6 8.8 SGOT 7* 5* 7* AP 52 56 57  
TP 6.5 6.5 6.5 ALB 3.4 3.4 3.2 GLOB 3.1 3.1 3.3 AGRAT 1.1* 1.1* 1.0*  
MG 2.5* 2.8* 2.7* PHOS 4.9* 4.6* 4.9* Imaging: 
 
Medications: 
Current Facility-Administered Medications Medication Dose Route Frequency  alum-mag hydroxide-simeth (MYLANTA) oral suspension 30 mL  30 mL Oral Q4H PRN  psyllium husk-aspartame (METAMUCIL FIBER) packet 1 Packet  1 Packet Oral BID PRN  
 central line flush (saline) syringe 10 mL  10 mL InterCATHeter PRN  
 heparin (porcine) pf 300 Units  300 Units InterCATHeter PRN  
 0.9% sodium chloride infusion 250 mL  250 mL IntraVENous PRN  polyethylene glycol (MIRALAX) packet 17 g  17 g Oral DAILY PRN  
 levoFLOXacin (LEVAQUIN) tablet 500 mg  500 mg Oral ACB  ondansetron (ZOFRAN) injection 8 mg  8 mg IntraVENous Q8H  
 dexamethasone (DECADRON) injection 10 mg  10 mg IntraVENous Q24H  
 LORazepam (ATIVAN) injection 0.5 mg  0.5 mg IntraVENous Q6H PRN  
 cytarabine (CYTOSAR) 205 mg in 0.9% sodium chloride 250 mL chemo infusion  100 mg/m2 (Treatment Plan Recorded) IntraVENous Q24H  
 acetaminophen (TYLENOL) tablet 650 mg  650 mg Oral PRN  
 diphenhydrAMINE (BENADRYL) capsule 25 mg  25 mg Oral PRN  potassium chloride (K-DUR, KLOR-CON) SR tablet 20 mEq  20 mEq Oral BID  acetaminophen/diphenhydrAMINE (TYLENOL PM EXT STR) 500/25 mg   Oral QHS  acyclovir (ZOVIRAX) tablet 400 mg  400 mg Oral BID  allopurinol (ZYLOPRIM) tablet 300 mg  300 mg Oral DAILY  calcium carbonate (OS-CHRIS) tablet 500 mg [elemental]  500 mg Oral TID WITH MEALS  escitalopram oxalate (LEXAPRO) tablet 10 mg  10 mg Oral DAILY  fluconazole (DIFLUCAN) tablet 200 mg  200 mg Oral DAILY  lidocaine-prilocaine (EMLA) 2.5-2.5 % cream   Topical PRN  
 LORazepam (ATIVAN) tablet 1 mg  1 mg Oral QHS  ondansetron (ZOFRAN ODT) tablet 8 mg  8 mg Oral Q8H PRN  pravastatin (PRAVACHOL) tablet 10 mg  10 mg Oral QHS  vit C-E-zinc cit-lutein-zeaxan 50 mg-15 unit- 4.5 mg-2.5 mg chew (OCU-ABDOUL) 1 Tab (Patient Supplied)  1 Tab Oral DAILY  ergocalciferol capsule 50,000 Units  50,000 Units Oral every Wednesday ASSESSMENT: 
 
Problem List  Date Reviewed: 4/30/2019 Codes Class Noted Acute myeloid leukemia not having achieved remission ([facility] ICD-10-CM: C92.00 ICD-9-CM: 205.00  5/9/2019 Admission for antineoplastic chemotherapy ICD-10-CM: Z51.11 ICD-9-CM: V58.11  5/5/2019 AML (acute myeloblastic leukemia) ([facility] ICD-10-CM: C92.00 ICD-9-CM: 205.00  4/28/2019 Weakness generalized ICD-10-CM: R53.1 ICD-9-CM: 780.79  4/28/2019 Pancytopenia ([facility].) ICD-10-CM: E54.853 ICD-9-CM: 284.19  4/28/2019 Thrombocytopenia ([facility].) ICD-10-CM: D69.6 ICD-9-CM: 287.5  4/27/2019 PLAN: 
AML FLT 3+ 
5/6  BMbx planned for Tuesday then wait for [facility] from port results to determine start date of  cycle one 7 + 3 with Midostaurin day 8-21. Platelets will need to be at least 50k for bx tomorrow 5/7. BMbx today-platelets prior. Also needed prbc today for hgb 6.9. 
5/08 Start 7+3 when afebrile per Dr. Sana Paz. 5/09 Day 1 of 7+3. Monitor TLS labs. 5/10 Day 2 7+3. Tolerating well. 5/13 Day 5 7+3. Midostaurin on the way. 
 
  
Possible port infection 5/5 Day one vanc/cefe. Follow cultures. Do not use port. May need to have Echo if cultures positive. 5/6 BCNTD. Appears improved today. Continue to monitor. No fevers or other symptoms. 5/7 Day 3 vanc/cefe. Site appears even better today. 5/8 Does not appear infected. Site bruised. 5/9 BCx negative. Per ID okay to start treatment. 5/13 Completed 7 days Vanc/cef. Now on levaquin per ID. Neutropenic Fever 05/05 Pan-culture negative. On Vancomycin, Maxipime 05/08 Febrile overnight up to 102.5. Repeat cultures x 1. Continue antibiotics. Likely disease related. Consult ID for clearance. 05/09 BC remain negative. No fevers 48 hours. Continue vanc/cefe. 05/13 now only on LVQ per ID Bradycardia 5/14 EKG with no significant change. Alexander SOPs Supportive care ACV/Diflucan 
LVQ dc'd while on cefe and vanc Goals and plan of care reviewed with the patient. All questions answered to the best of our ability. Shikha Lauren, ARMANDO 
 
[address] Office : [phone] Fax : [phone] Attending Addendum: 
Patient seen with NP.  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  Pre tx course complicated by febrile neutropenia treated with vanc/cef and pt remains on LVQ.  Today, she is feeling well.  at bedside.  I personally performed a face to face diagnostic evaluation on this patient.  My findings are as follows: A&ox3, lungs clear, heart regular, abdomen benign and ankle edema better today. Midostaurin approved and per pt on way to be delivered. Gonzalo Montes for start on D8-21.  TLS labs monitored.  Sinus irish on EKG - asymptomatic. No qtc prolongation. C/w supportive care and transfuse per protocol.    
  
I have reviewed and agree with the care plan.     
  
  
 
  
Andi Sumner MD 
[facility] Hematology and Oncology [id number] [address] Office : [phone] Fax : [phone]

## 2019-05-15 NOTE — PROGRESS NOTES
Problem: Falls - Risk of 
Goal: *Absence of Falls Description Document Aaron Ortiz Fall Risk and appropriate interventions in the flowsheet. Outcome: Progressing Towards Goal 
  
Problem: Discharge Planning Goal: *Discharge to safe environment Outcome: Progressing Towards Goal 
Goal: *Knowledge of medication management Outcome: Progressing Towards Goal 
Goal: *Knowledge of discharge instructions Outcome: Progressing Towards Goal 
  
Problem: Anemia Care Plan (Adult and Pediatric) Goal: *Labs within defined limits Outcome: Progressing Towards Goal 
Goal: *Tolerates increased activity Outcome: Progressing Towards Goal 
  
Problem: Pain Goal: *Control of Pain Outcome: Progressing Towards Goal 
  
Problem: Nutrition Deficit Goal: *Optimize nutritional status Outcome: Progressing Towards Goal 
  
Problem: Chemotherapy, Day 3 to Discharge Goal: Off Pathway (Use only if patient is Off Pathway) Outcome: Progressing Towards Goal 
Goal: Activity/Safety Outcome: Progressing Towards Goal 
Goal: Consults, if ordered Outcome: Progressing Towards Goal 
Goal: Diagnostic Test/Procedures Outcome: Progressing Towards Goal 
Goal: Nutrition/Diet Outcome: Progressing Towards Goal 
Goal: Discharge Planning Outcome: Progressing Towards Goal 
Goal: Medications Outcome: Progressing Towards Goal 
Goal: Respiratory Outcome: Progressing Towards Goal 
Goal: Treatments/Interventions/Procedures Outcome: Progressing Towards Goal 
Goal: Psychosocial 
Outcome: Progressing Towards Goal 
Goal: *Optimal pain control at patient's stated goal 
Outcome: Progressing Towards Goal 
Goal: *Hemodynamically stable Outcome: Progressing Towards Goal 
Goal: *Adequate oxygenation Outcome: Progressing Towards Goal

## 2019-05-15 NOTE — PROGRESS NOTES
Financial navigation made contact with patient to introduce myself to her and see if she had any financial concerns. Patient stated that she is fully covered and has no concerns at the moment. I informed her that since she is a new patient she will be seen at the cancer center by financial navigation to ensure that she to make sure she if fully optimizing her insurance benefits and to make her aware of other resources out her in the community. I left my card to ensure patient had my contact information.

## 2019-05-15 NOTE — PROGRESS NOTES
0696-Bedside report received from Javi Mariscal RN. Resting in bed. No needs voiced. No s/s of acute distress. 1730-Nurse paged Gillian Andres NP about pt's need for pain medication related to esophagitis pain. 1815-END OF SHIFT NOTE: 
Pt's VSS and is in no acute distress. Pt started to have mucositis and esophagitis pain today. Magic mouthwash and pain medications started. Pt encouraged to complete mouth care every 2 hours. Intake/Output 05/15 0701 - 05/15 1900 In: 1045 [P.O.:720; I.V.:325] Out: 1000 [Urine:1000] Voiding: YES Catheter: NO 
Drain:   
 
Stool:  0 occurrences. Stool Assessment Stool Color: Magalys Ingles (05/11/19 1606) Stool Appearance: Formed (05/11/19 1606) Stool Amount: Small (05/11/19 1606) Stool Source/Status: Rectum (05/11/19 1606) Emesis:  0 occurrences. VITAL SIGNS Patient Vitals for the past 12 hrs: 
 Temp Pulse Resp BP SpO2  
05/15/19 1612 98.7 °F (37.1 °C) (!) 51 16 131/72 97 % 05/15/19 1312 97.9 °F (36.6 °C) 63 18 144/66 96 % 05/15/19 0738 98.5 °F (36.9 °C) (!) 51 18 144/61 96 % Pain Assessment Pain 1 Pain Scale 1: Numeric (0 - 10) (05/15/19 1736) Pain Intensity 1: 5 (05/15/19 1736) Patient Stated Pain Goal: 0 (05/15/19 0020) Pain Location 1: Abdomen (05/15/19 1736) Pain Orientation 1: Upper (05/15/19 1736) Pain Description 1: Aching;Burning (05/15/19 1736) Pain Intervention(s) 1: Medication (see MAR) (05/15/19 1736) Ambulating Yes Evorn Gladstone Cecelia 1855-Bedside shift change report given to Krishna Dai (oncoming nurse) by Marta Dickinson RN (offgoing nurse). Report included the following information SBAR, Kardex, Intake/Output, MAR and Recent Results.

## 2019-05-15 NOTE — INTERDISCIPLINARY ROUNDS
Interdisciplinary rounds with charge nurse, provider,  and . 
  
Presenting Problem: AML - chemo Daily Goal: chemo; day 7 of 7+3, mouth care Expected Discharge Date: after count recovery Expected Discharge Needs: none Patient/Family Concerns addressed

## 2019-05-16 LAB
ALBUMIN SERPL-MCNC: 3.3 G/DL (ref 3.2–4.6)
ALBUMIN/GLOB SERPL: 1.1 {RATIO} (ref 1.2–3.5)
ALP SERPL-CCNC: 51 U/L (ref 50–136)
ALT SERPL-CCNC: 17 U/L (ref 12–65)
ANION GAP SERPL CALC-SCNC: 6 MMOL/L (ref 7–16)
AST SERPL-CCNC: 6 U/L (ref 15–37)
BILIRUB SERPL-MCNC: 0.7 MG/DL (ref 0.2–1.1)
BUN SERPL-MCNC: 24 MG/DL (ref 8–23)
CALCIUM SERPL-MCNC: 9.1 MG/DL (ref 8.3–10.4)
CHLORIDE SERPL-SCNC: 102 MMOL/L (ref 98–107)
CO2 SERPL-SCNC: 28 MMOL/L (ref 21–32)
CREAT SERPL-MCNC: 0.69 MG/DL (ref 0.6–1)
DIFFERENTIAL METHOD BLD: ABNORMAL
ERYTHROCYTE [DISTWIDTH] IN BLOOD BY AUTOMATED COUNT: 14.2 % (ref 11.9–14.6)
FLOW CYTOMETRY, FBTC1: NORMAL
GLOBULIN SER CALC-MCNC: 3.1 G/DL (ref 2.3–3.5)
GLUCOSE SERPL-MCNC: 139 MG/DL (ref 65–100)
HCT VFR BLD AUTO: 21.1 % (ref 35.8–46.3)
HGB BLD-MCNC: 7 G/DL (ref 11.7–15.4)
LDH SERPL L TO P-CCNC: 185 U/L (ref 110–210)
MAGNESIUM SERPL-MCNC: 2.3 MG/DL (ref 1.8–2.4)
MCH RBC QN AUTO: 31.5 PG (ref 26.1–32.9)
MCHC RBC AUTO-ENTMCNC: 33.8 G/DL (ref 31.4–35)
MCV RBC AUTO: 93.2 FL (ref 79.6–97.8)
NRBC # BLD: 0 K/UL (ref 0–0.2)
PHOSPHATE SERPL-MCNC: 4.9 MG/DL (ref 2.3–3.7)
PLATELET # BLD AUTO: 31 K/UL (ref 150–450)
PLATELET COMMENTS,PCOM: ABNORMAL
PMV BLD AUTO: 10 FL (ref 9.4–12.3)
POTASSIUM SERPL-SCNC: 4.9 MMOL/L (ref 3.5–5.1)
PROT SERPL-MCNC: 6.4 G/DL (ref 6.3–8.2)
RBC # BLD AUTO: 2.22 M/UL (ref 4.05–5.2)
RBC MORPH BLD: ABNORMAL
SODIUM SERPL-SCNC: 136 MMOL/L (ref 136–145)
SPECIMEN SOURCE: NORMAL
TEST ORDERED:: NORMAL
URATE SERPL-MCNC: 3.5 MG/DL (ref 2.6–6)
WBC # BLD AUTO: 0.1 K/UL (ref 4.3–11.1)
WBC MORPH BLD: ABNORMAL

## 2019-05-16 PROCEDURE — 65270000029 HC RM PRIVATE

## 2019-05-16 PROCEDURE — 86923 COMPATIBILITY TEST ELECTRIC: CPT

## 2019-05-16 PROCEDURE — 99232 SBSQ HOSP IP/OBS MODERATE 35: CPT | Performed by: INTERNAL MEDICINE

## 2019-05-16 PROCEDURE — 74011250637 HC RX REV CODE- 250/637: Performed by: INTERNAL MEDICINE

## 2019-05-16 PROCEDURE — 86644 CMV ANTIBODY: CPT

## 2019-05-16 PROCEDURE — 83615 LACTATE (LD) (LDH) ENZYME: CPT

## 2019-05-16 PROCEDURE — 83735 ASSAY OF MAGNESIUM: CPT

## 2019-05-16 PROCEDURE — 84550 ASSAY OF BLOOD/URIC ACID: CPT

## 2019-05-16 PROCEDURE — 86900 BLOOD TYPING SEROLOGIC ABO: CPT

## 2019-05-16 PROCEDURE — P9040 RBC LEUKOREDUCED IRRADIATED: HCPCS

## 2019-05-16 PROCEDURE — 74011250637 HC RX REV CODE- 250/637: Performed by: NURSE PRACTITIONER

## 2019-05-16 PROCEDURE — 74011250636 HC RX REV CODE- 250/636: Performed by: INTERNAL MEDICINE

## 2019-05-16 PROCEDURE — 77030039270 HC TU BLD FLTR CARD -A

## 2019-05-16 PROCEDURE — 36430 TRANSFUSION BLD/BLD COMPNT: CPT

## 2019-05-16 PROCEDURE — 85025 COMPLETE CBC W/AUTO DIFF WBC: CPT

## 2019-05-16 PROCEDURE — 36591 DRAW BLOOD OFF VENOUS DEVICE: CPT

## 2019-05-16 PROCEDURE — 84100 ASSAY OF PHOSPHORUS: CPT

## 2019-05-16 PROCEDURE — 80053 COMPREHEN METABOLIC PANEL: CPT

## 2019-05-16 PROCEDURE — 77030003560 HC NDL HUBR BARD -A

## 2019-05-16 RX ORDER — SUCRALFATE 1 G/1
1 TABLET ORAL
Status: DISCONTINUED | OUTPATIENT
Start: 2019-05-16 | End: 2019-06-07 | Stop reason: HOSPADM

## 2019-05-16 RX ORDER — PANTOPRAZOLE SODIUM 40 MG/1
40 TABLET, DELAYED RELEASE ORAL
Status: DISCONTINUED | OUTPATIENT
Start: 2019-05-17 | End: 2019-06-07 | Stop reason: HOSPADM

## 2019-05-16 RX ADMIN — PRAVASTATIN SODIUM 10 MG: 20 TABLET ORAL at 21:31

## 2019-05-16 RX ADMIN — ESCITALOPRAM OXALATE 10 MG: 10 TABLET ORAL at 07:58

## 2019-05-16 RX ADMIN — LEVOFLOXACIN 500 MG: 500 TABLET, FILM COATED ORAL at 06:07

## 2019-05-16 RX ADMIN — ACYCLOVIR 400 MG: 800 TABLET ORAL at 07:58

## 2019-05-16 RX ADMIN — ACETAMINOPHEN 650 MG: 325 TABLET, FILM COATED ORAL at 14:23

## 2019-05-16 RX ADMIN — LORAZEPAM 1 MG: 1 TABLET ORAL at 03:48

## 2019-05-16 RX ADMIN — ACYCLOVIR 400 MG: 800 TABLET ORAL at 17:48

## 2019-05-16 RX ADMIN — SUCRALFATE 1 G: 1 TABLET ORAL at 16:37

## 2019-05-16 RX ADMIN — DIPHENHYDRAMINE HYDROCHLORIDE 25 MG: 25 CAPSULE ORAL at 14:23

## 2019-05-16 RX ADMIN — Medication 5 ML: at 22:16

## 2019-05-16 RX ADMIN — CALCIUM ACETATE 667 MG: 667 CAPSULE ORAL at 12:55

## 2019-05-16 RX ADMIN — SODIUM CHLORIDE, PRESERVATIVE FREE 300 UNITS: 5 INJECTION INTRAVENOUS at 21:49

## 2019-05-16 RX ADMIN — SODIUM CHLORIDE 250 ML: 900 INJECTION, SOLUTION INTRAVENOUS at 15:12

## 2019-05-16 RX ADMIN — HYDROCODONE BITARTRATE AND ACETAMINOPHEN 1 TABLET: 5; 325 TABLET ORAL at 07:56

## 2019-05-16 RX ADMIN — FLUCONAZOLE 200 MG: 100 TABLET ORAL at 07:57

## 2019-05-16 RX ADMIN — DEXAMETHASONE SODIUM PHOSPHATE 10 MG: 10 INJECTION INTRAMUSCULAR; INTRAVENOUS at 15:21

## 2019-05-16 RX ADMIN — SUCRALFATE 1 G: 1 TABLET ORAL at 21:33

## 2019-05-16 RX ADMIN — ACETAMINOPHEN: 500 TABLET, FILM COATED ORAL at 21:31

## 2019-05-16 RX ADMIN — Medication 10 ML: at 21:49

## 2019-05-16 RX ADMIN — Medication 500 MG: at 12:55

## 2019-05-16 RX ADMIN — CALCIUM ACETATE 667 MG: 667 CAPSULE ORAL at 16:38

## 2019-05-16 RX ADMIN — ALUMINUM HYDROXIDE, MAGNESIUM HYDROXIDE, AND SIMETHICONE 30 ML: 200; 200; 20 SUSPENSION ORAL at 12:55

## 2019-05-16 RX ADMIN — POTASSIUM CHLORIDE 20 MEQ: 20 TABLET, EXTENDED RELEASE ORAL at 17:46

## 2019-05-16 RX ADMIN — CALCIUM ACETATE 667 MG: 667 CAPSULE ORAL at 07:58

## 2019-05-16 RX ADMIN — Medication 500 MG: at 07:58

## 2019-05-16 RX ADMIN — Medication 5 ML: at 14:26

## 2019-05-16 RX ADMIN — Medication 5 ML: at 08:00

## 2019-05-16 RX ADMIN — ALLOPURINOL 300 MG: 300 TABLET ORAL at 07:58

## 2019-05-16 RX ADMIN — LORAZEPAM 1 MG: 1 TABLET ORAL at 21:32

## 2019-05-16 RX ADMIN — Medication 500 MG: at 16:37

## 2019-05-16 RX ADMIN — POTASSIUM CHLORIDE 20 MEQ: 20 TABLET, EXTENDED RELEASE ORAL at 07:57

## 2019-05-16 NOTE — PROGRESS NOTES
[facility] Hematology & Oncology Inpatient Hematology / Oncology Progress Note Admission Date: 2019 10:53 AM 
Reason for Admission/Hospital Course: Admission for antineoplastic chemotherapy [Z51.11] 24 Hour Events: VSS/afebrile Day 8 of 7+3 Ongoing epigastric pain with meals ROS: 
Constitutional:  negative for fever, chills, weakness, malaise, fatigue. CV:  negative for chest pain, palpitations, edema. Respiratory: negative for dyspnea, cough, wheezing. GI: negative for nausea, abdominal pain, diarrhea. 10 point review of systems is otherwise negative with the exception of the elements mentioned above in the HPI. No Known Allergies OBJECTIVE: 
Patient Vitals for the past 8 hrs: 
 BP Temp Pulse Resp SpO2  
19 1153 121/67 98.6 °F (37 °C) (!) 53 18 97 % 19 0743 143/66 98.3 °F (36.8 °C) 70 17 96 % Temp (24hrs), Av.5 °F (36.9 °C), Min:98.1 °F (36.7 °C), Max:98.7 °F (37.1 °C) 
 
 0701 -  1900 In: 1120 [P.O.:1120] Out: 1000 [Urine:1000] Physical Exam: 
Constitutional: Well developed, well nourished female in no acute distress, sitting comfortably in bed HEENT: Normocephalic and atraumatic. Oropharynx is clear, mucous membranes are moist. Extraocular muscles are intact. Sclerae anicteric. Skin Warm and dry. No rash noted. No erythema. No pallor. Respiratory Lungs are clear to auscultation bilaterally without wheezes, rales or rhonchi, normal air exchange without accessory muscle use. CVS Bradycardia, regular rhythm and normal S1 and S2. No murmurs, gallops, or rubs. Abdomen Soft, nontender and nondistended, normoactive bowel sounds. Neuro Grossly nonfocal with no obvious sensory or motor deficits. MSK Normal range of motion in general. BLE edema improved Psych Appropriate mood and affect. Labs: 
   
Recent Labs 19 
0866 05/15/19 
9685 19 
1894 WBC 0.1* 0.1* 0.2*  
RBC 2.22* 2.29* 2.28* HGB 7.0* 7.3* 7.4* HCT 21.1* 21.4* 21.5* MCV 93.2 93.4 94.3 MCH 31.5 31.9 32.5 MCHC 33.8 34.1 34.4  
RDW 14.2 14.3 14.6 PLT 31* 36* 9*  
DF MANUAL AUTOMATED AUTOMATED Recent Labs 05/16/19 
7731 05/15/19 
5402 05/14/19 
5046  137 137  
K 4.9 4.5 4.5  
 105 106 CO2 28 24 23 AGAP 6* 8 8 * 136* 149* BUN 24* 27* 28* CREA 0.69 0.79 0.82 GFRAA >60 >60 >60 GFRNA >60 >60 >60  
CA 9.1 8.9 8.6 SGOT 6* 7* 5* AP 51 52 56  
TP 6.4 6.5 6.5 ALB 3.3 3.4 3.4 GLOB 3.1 3.1 3.1 AGRAT 1.1* 1.1* 1.1*  
MG 2.3 2.5* 2.8* PHOS 4.9* 4.9* 4.6* Imaging: 
 
Medications: 
Current Facility-Administered Medications Medication Dose Route Frequency  midostaurin (RYDAPT) chemo capsule 50 mg (Patient Supplied)  50 mg Oral Q12H  
 sucralfate (CARAFATE) tablet 1 g  1 g Oral AC&HS  [START ON 5/17/2019] pantoprazole (PROTONIX) tablet 40 mg  40 mg Oral ACB  alum-mag hydroxide-simeth (MYLANTA) oral suspension 30 mL  30 mL Oral Q4H PRN  
 calcium acetate (PHOSLO) capsule 667 mg  1 Cap Oral TID WITH MEALS  magic mouthwash (RENÉ) oral suspension 5 mL  5 mL Oral AC&HS  morphine injection 2 mg  2 mg IntraVENous Q4H PRN  
 HYDROcodone-acetaminophen (NORCO) 5-325 mg per tablet 1 Tab  1 Tab Oral Q6H PRN  psyllium husk-aspartame (METAMUCIL FIBER) packet 1 Packet  1 Packet Oral BID PRN  
 central line flush (saline) syringe 10 mL  10 mL InterCATHeter PRN  
 heparin (porcine) pf 300 Units  300 Units InterCATHeter PRN  
 0.9% sodium chloride infusion 250 mL  250 mL IntraVENous PRN  polyethylene glycol (MIRALAX) packet 17 g  17 g Oral DAILY PRN  
 levoFLOXacin (LEVAQUIN) tablet 500 mg  500 mg Oral ACB  dexamethasone (DECADRON) injection 10 mg  10 mg IntraVENous Q24H  
 LORazepam (ATIVAN) injection 0.5 mg  0.5 mg IntraVENous Q6H PRN  
 cytarabine (CYTOSAR) 205 mg in 0.9% sodium chloride 250 mL chemo infusion  100 mg/m2 (Treatment Plan Recorded) IntraVENous Q24H  acetaminophen (TYLENOL) tablet 650 mg  650 mg Oral PRN  
 diphenhydrAMINE (BENADRYL) capsule 25 mg  25 mg Oral PRN  potassium chloride (K-DUR, KLOR-CON) SR tablet 20 mEq  20 mEq Oral BID  acetaminophen/diphenhydrAMINE (TYLENOL PM EXT STR) 500/25 mg   Oral QHS  acyclovir (ZOVIRAX) tablet 400 mg  400 mg Oral BID  allopurinol (ZYLOPRIM) tablet 300 mg  300 mg Oral DAILY  calcium carbonate (OS-CHRIS) tablet 500 mg [elemental]  500 mg Oral TID WITH MEALS  escitalopram oxalate (LEXAPRO) tablet 10 mg  10 mg Oral DAILY  fluconazole (DIFLUCAN) tablet 200 mg  200 mg Oral DAILY  lidocaine-prilocaine (EMLA) 2.5-2.5 % cream   Topical PRN  
 LORazepam (ATIVAN) tablet 1 mg  1 mg Oral QHS  ondansetron (ZOFRAN ODT) tablet 8 mg  8 mg Oral Q8H PRN  pravastatin (PRAVACHOL) tablet 10 mg  10 mg Oral QHS  vit C-E-zinc cit-lutein-zeaxan 50 mg-15 unit- 4.5 mg-2.5 mg chew (OCU-ABDOUL) 1 Tab (Patient Supplied)  1 Tab Oral DAILY  ergocalciferol capsule 50,000 Units  50,000 Units Oral every Wednesday ASSESSMENT: 
 
Problem List  Date Reviewed: 4/30/2019 Codes Class Noted Acute myeloid leukemia not having achieved remission ([facility] ICD-10-CM: C92.00 ICD-9-CM: 205.00  5/9/2019 Admission for antineoplastic chemotherapy ICD-10-CM: Z51.11 ICD-9-CM: V58.11  5/5/2019 AML (acute myeloblastic leukemia) ([facility] ICD-10-CM: C92.00 ICD-9-CM: 205.00  4/28/2019 Weakness generalized ICD-10-CM: R53.1 ICD-9-CM: 780.79  4/28/2019 Pancytopenia ([facility].) ICD-10-CM: L37.013 ICD-9-CM: 284.19  4/28/2019 Thrombocytopenia ([facility] ICD-10-CM: D69.6 ICD-9-CM: 287.5  4/27/2019 PLAN: 
AML FLT 3+ 
5/6  BMbx planned for Tuesday then wait for [facility] from port results to determine start date of  cycle one 7 + 3 with Midostaurin day 8-21. Platelets will need to be at least 50k for bx tomorrow 5/7. BMbx today-platelets prior. Also needed prbc today for hgb 6.9. 5/08 Start 7+3 when afebrile per Dr. Zeenat Oglesby. 5/09 Day 1 of 7+3. Monitor TLS labs. 5/10 Day 2 7+3. Tolerating well. 5/13 Day 5 7+3. Midostaurin on the way. 5/16 Day 8 7+3. Day one Rydapt Pancytopenia related to chemotherapy 5/16 transfuse per Alexander SOPs 
  
Possible port infection 5/5 Day one vanc/cefe. Follow cultures. Do not use port. May need to have Echo if cultures positive. 5/6 BCNTD. Appears improved today. Continue to monitor. No fevers or other symptoms. 5/7 Day 3 vanc/cefe. Site appears even better today. 5/8 Does not appear infected. Site bruised. 5/9 BCx negative. Per ID okay to start treatment. 5/13 Completed 7 days Vanc/cef. Now on levaquin per ID. Neutropenic Fever 05/05 Pan-culture negative. On Vancomycin, Maxipime 05/08 Febrile overnight up to 102.5. Repeat cultures x 1. Continue antibiotics. Likely disease related. Consult ID for clearance. 05/09 BC remain negative. No fevers 48 hours. Continue vanc/cefe. 05/13 now only on LVQ per ID Bradycardia 5/14 EKG with no significant change. Alexander SOPs Supportive care ACV/Diflucan 
LVQ dc'd while on cefe and vanc Goals and plan of care reviewed with the patient. All questions answered to the best of our ability. Deana Fields NP 
 
[address], [address] [place] [address] Office : [phone] Fax : [phone] Attending Addendum: 
Patient seen with NP.  Pt with newly diagnosed AML FLT3+ on 7+3 B4.  FQV tx course complicated by febrile neutropenia treated with vanc/cef and pt remains on LVQ.  Today, she is feeling well.  + BM.    at bedside.  I personally performed a face to face diagnostic evaluation on this patient.  My findings are as follows: A&ox3, lungs clear, heart regular, abdomen benign and ankle edema better today.  Midostaurin approved and plan to start today (D8-21).  TLS labs monitored.  Sinus irish on EKG - asymptomatic.  No qtc prolongation.  C/w supportive care and transfuse per protocol.    
  
I have reviewed and agree with the care plan.     
  
  
 
  
Paola Staff, MD 
[facility] Hematology and Oncology [address] [place] [address] Office : [phone] Fax : [phone]

## 2019-05-16 NOTE — PROGRESS NOTES
Interdisciplinary rounds with charge nurse, provider,  and . 
  
Presenting Problem: AML - chemo Daily Goal: supportive care, monitor counts, mouth care Expected Discharge Date: after count recovery Expected Discharge Needs: none Patient/Family Concerns addressed

## 2019-05-16 NOTE — PROGRESS NOTES
END OF SHIFT NOTE: 
 
Patient rested well, CPAP qHS, medicated for esophagitis pain x1. No c/o nausea. D8 7+3, chemo infusing without difficulty. Will receive 1 unit PRBCs today. Port dressing due to be changed today. Intake/Output 05/15 1901 - 05/16 0700 In: 196 [I.V.:196] Out: 1825 [MDXKP:0416] Voiding: YES Catheter: NO 
Drain:   
 
 
 
 
 
Stool:  0 occurrences. Stool Assessment Stool Color: Julio Old (05/11/19 1606) Stool Appearance: Formed (05/11/19 1606) Stool Amount: Small (05/11/19 1606) Stool Source/Status: Rectum (05/11/19 1606) Emesis:  0 occurrences. VITAL SIGNS Patient Vitals for the past 12 hrs: 
 Temp Pulse Resp BP SpO2  
05/16/19 0337 98.5 °F (36.9 °C) (!) 48 16 147/73 95 % 05/15/19 2216 98.5 °F (36.9 °C) (!) 52 18 146/62 96 % 05/15/19 1930 98.1 °F (36.7 °C) (!) 50 17 141/62 96 % Pain Assessment Pain 1 Pain Scale 1: Visual (05/16/19 0249) Pain Intensity 1: 0 (05/16/19 0249) Patient Stated Pain Goal: 0 (05/16/19 0249) Pain Reassessment 1: Yes (05/15/19 1816) Pain Location 1: Abdomen (05/15/19 1736) Pain Orientation 1: Upper (05/15/19 1736) Pain Description 1: Aching;Burning (05/15/19 1736) Pain Intervention(s) 1: Medication (see MAR) (05/15/19 1736) Ambulating Yes Shift report given to oncoming nurse at the bedside. Catheline Alex

## 2019-05-17 LAB
ALBUMIN SERPL-MCNC: 3.2 G/DL (ref 3.2–4.6)
ALBUMIN/GLOB SERPL: 1 {RATIO} (ref 1.2–3.5)
ALP SERPL-CCNC: 52 U/L (ref 50–136)
ALT SERPL-CCNC: 18 U/L (ref 12–65)
ANION GAP SERPL CALC-SCNC: 5 MMOL/L (ref 7–16)
AST SERPL-CCNC: 8 U/L (ref 15–37)
BILIRUB SERPL-MCNC: 0.9 MG/DL (ref 0.2–1.1)
BUN SERPL-MCNC: 23 MG/DL (ref 8–23)
CALCIUM SERPL-MCNC: 8.9 MG/DL (ref 8.3–10.4)
CHLORIDE SERPL-SCNC: 101 MMOL/L (ref 98–107)
CO2 SERPL-SCNC: 28 MMOL/L (ref 21–32)
CREAT SERPL-MCNC: 0.65 MG/DL (ref 0.6–1)
DIFFERENTIAL METHOD BLD: ABNORMAL
ERYTHROCYTE [DISTWIDTH] IN BLOOD BY AUTOMATED COUNT: 14.2 % (ref 11.9–14.6)
GLOBULIN SER CALC-MCNC: 3.2 G/DL (ref 2.3–3.5)
GLUCOSE SERPL-MCNC: 129 MG/DL (ref 65–100)
HCT VFR BLD AUTO: 23.4 % (ref 35.8–46.3)
HGB BLD-MCNC: 8 G/DL (ref 11.7–15.4)
LDH SERPL L TO P-CCNC: 174 U/L (ref 110–210)
MAGNESIUM SERPL-MCNC: 2.3 MG/DL (ref 1.8–2.4)
MCH RBC QN AUTO: 31.1 PG (ref 26.1–32.9)
MCHC RBC AUTO-ENTMCNC: 34.2 G/DL (ref 31.4–35)
MCV RBC AUTO: 91.1 FL (ref 79.6–97.8)
NRBC # BLD: 0 K/UL (ref 0–0.2)
PHOSPHATE SERPL-MCNC: 4.6 MG/DL (ref 2.3–3.7)
PLATELET # BLD AUTO: 19 K/UL (ref 150–450)
PLATELET COMMENTS,PCOM: ABNORMAL
PMV BLD AUTO: 8.8 FL (ref 9.4–12.3)
POTASSIUM SERPL-SCNC: 4.8 MMOL/L (ref 3.5–5.1)
PROT SERPL-MCNC: 6.4 G/DL (ref 6.3–8.2)
RBC # BLD AUTO: 2.57 M/UL (ref 4.05–5.2)
RBC MORPH BLD: ABNORMAL
SODIUM SERPL-SCNC: 134 MMOL/L (ref 136–145)
URATE SERPL-MCNC: 2.9 MG/DL (ref 2.6–6)
WBC # BLD AUTO: 0.1 K/UL (ref 4.3–11.1)
WBC MORPH BLD: ABNORMAL

## 2019-05-17 PROCEDURE — 74011250637 HC RX REV CODE- 250/637: Performed by: INTERNAL MEDICINE

## 2019-05-17 PROCEDURE — 80053 COMPREHEN METABOLIC PANEL: CPT

## 2019-05-17 PROCEDURE — 74011250637 HC RX REV CODE- 250/637: Performed by: NURSE PRACTITIONER

## 2019-05-17 PROCEDURE — 74011250636 HC RX REV CODE- 250/636: Performed by: INTERNAL MEDICINE

## 2019-05-17 PROCEDURE — 36591 DRAW BLOOD OFF VENOUS DEVICE: CPT

## 2019-05-17 PROCEDURE — 74011000250 HC RX REV CODE- 250: Performed by: NURSE PRACTITIONER

## 2019-05-17 PROCEDURE — 83735 ASSAY OF MAGNESIUM: CPT

## 2019-05-17 PROCEDURE — 84100 ASSAY OF PHOSPHORUS: CPT

## 2019-05-17 PROCEDURE — 83615 LACTATE (LD) (LDH) ENZYME: CPT

## 2019-05-17 PROCEDURE — 65270000029 HC RM PRIVATE

## 2019-05-17 PROCEDURE — 84550 ASSAY OF BLOOD/URIC ACID: CPT

## 2019-05-17 PROCEDURE — 99232 SBSQ HOSP IP/OBS MODERATE 35: CPT | Performed by: INTERNAL MEDICINE

## 2019-05-17 PROCEDURE — 85025 COMPLETE CBC W/AUTO DIFF WBC: CPT

## 2019-05-17 RX ORDER — CHLORHEXIDINE GLUCONATE 1.2 MG/ML
15 RINSE ORAL 2 TIMES DAILY
Status: DISCONTINUED | OUTPATIENT
Start: 2019-05-17 | End: 2019-06-07 | Stop reason: HOSPADM

## 2019-05-17 RX ORDER — POTASSIUM CHLORIDE 20 MEQ/1
20 TABLET, EXTENDED RELEASE ORAL DAILY
Status: DISCONTINUED | OUTPATIENT
Start: 2019-05-17 | End: 2019-06-07 | Stop reason: HOSPADM

## 2019-05-17 RX ADMIN — Medication 5 ML: at 11:03

## 2019-05-17 RX ADMIN — Medication 500 MG: at 08:11

## 2019-05-17 RX ADMIN — ACYCLOVIR 400 MG: 800 TABLET ORAL at 17:35

## 2019-05-17 RX ADMIN — ALUMINUM HYDROXIDE, MAGNESIUM HYDROXIDE, AND SIMETHICONE 30 ML: 200; 200; 20 SUSPENSION ORAL at 09:22

## 2019-05-17 RX ADMIN — SODIUM CHLORIDE, PRESERVATIVE FREE 300 UNITS: 5 INJECTION INTRAVENOUS at 17:39

## 2019-05-17 RX ADMIN — HYDROCODONE BITARTRATE AND ACETAMINOPHEN 1 TABLET: 5; 325 TABLET ORAL at 15:59

## 2019-05-17 RX ADMIN — POTASSIUM CHLORIDE 20 MEQ: 20 TABLET, EXTENDED RELEASE ORAL at 08:11

## 2019-05-17 RX ADMIN — SUCRALFATE 1 G: 1 TABLET ORAL at 08:10

## 2019-05-17 RX ADMIN — PANTOPRAZOLE SODIUM 40 MG: 40 TABLET, DELAYED RELEASE ORAL at 08:10

## 2019-05-17 RX ADMIN — CALCIUM ACETATE 667 MG: 667 CAPSULE ORAL at 17:34

## 2019-05-17 RX ADMIN — Medication 500 MG: at 11:04

## 2019-05-17 RX ADMIN — FLUCONAZOLE 200 MG: 100 TABLET ORAL at 08:11

## 2019-05-17 RX ADMIN — LORAZEPAM 1 MG: 1 TABLET ORAL at 21:18

## 2019-05-17 RX ADMIN — Medication 500 MG: at 17:34

## 2019-05-17 RX ADMIN — PRAVASTATIN SODIUM 10 MG: 20 TABLET ORAL at 21:18

## 2019-05-17 RX ADMIN — CHLORHEXIDINE GLUCONATE 15 ML: 1.2 RINSE ORAL at 17:37

## 2019-05-17 RX ADMIN — SUCRALFATE 1 G: 1 TABLET ORAL at 21:18

## 2019-05-17 RX ADMIN — CALCIUM ACETATE 667 MG: 667 CAPSULE ORAL at 11:04

## 2019-05-17 RX ADMIN — ALLOPURINOL 300 MG: 300 TABLET ORAL at 08:11

## 2019-05-17 RX ADMIN — Medication 10 ML: at 17:39

## 2019-05-17 RX ADMIN — ACETAMINOPHEN: 500 TABLET, FILM COATED ORAL at 21:18

## 2019-05-17 RX ADMIN — POLYETHYLENE GLYCOL 3350 17 G: 17 POWDER, FOR SOLUTION ORAL at 08:17

## 2019-05-17 RX ADMIN — SUCRALFATE 1 G: 1 TABLET ORAL at 15:59

## 2019-05-17 RX ADMIN — ACYCLOVIR 400 MG: 800 TABLET ORAL at 08:12

## 2019-05-17 RX ADMIN — LEVOFLOXACIN 500 MG: 500 TABLET, FILM COATED ORAL at 06:08

## 2019-05-17 RX ADMIN — ESCITALOPRAM OXALATE 10 MG: 10 TABLET ORAL at 08:11

## 2019-05-17 RX ADMIN — CALCIUM ACETATE 667 MG: 667 CAPSULE ORAL at 08:11

## 2019-05-17 RX ADMIN — SUCRALFATE 1 G: 1 TABLET ORAL at 11:04

## 2019-05-17 RX ADMIN — Medication 5 ML: at 16:00

## 2019-05-17 NOTE — PROGRESS NOTES
[facility] Hematology & Oncology Inpatient Hematology / Oncology Progress Note Admission Date: 2019 10:53 AM 
Reason for Admission/Hospital Course: Admission for antineoplastic chemotherapy [Z51.11] 24 Hour Events: VSS/afebrile Day 9 of 7+3 Acid reflux slight improvement Still epigastric burning with coffee this am 
She bit the inside of right check-use Peridex bid ROS: 
Constitutional:  negative for fever, chills, weakness, malaise, fatigue. CV:  negative for chest pain, palpitations, edema. Respiratory: negative for dyspnea, cough, wheezing. GI: negative for nausea, abdominal pain, diarrhea. 10 point review of systems is otherwise negative with the exception of the elements mentioned above in the HPI. No Known Allergies OBJECTIVE: 
Patient Vitals for the past 8 hrs: 
 BP Temp Pulse Resp SpO2  
19 1136 128/61 98.7 °F (37.1 °C) (!) 54 17 95 % 19 0816 131/66 97.5 °F (36.4 °C) (!) 59 18 96 % Temp (24hrs), Av.3 °F (36.8 °C), Min:97.5 °F (36.4 °C), Max:98.8 °F (37.1 °C) 
 
 0701 -  1900 In: 0536 [G.X.:1635] Out: 600 [Urine:600] Physical Exam: 
Constitutional: Well developed, well nourished female in no acute distress, sitting comfortably in bed HEENT: Normocephalic and atraumatic. Oropharynx is clear, mucous membranes are moist. Extraocular muscles are intact. Sclerae anicteric. Skin Warm and dry. No rash noted. No erythema. No pallor. Respiratory Lungs are clear to auscultation bilaterally without wheezes, rales or rhonchi, normal air exchange without accessory muscle use. CVS Bradycardia, regular rhythm and normal S1 and S2. No murmurs, gallops, or rubs. Abdomen Soft, nontender and nondistended, normoactive bowel sounds. Neuro Grossly nonfocal with no obvious sensory or motor deficits. MSK Normal range of motion in general. BLE edema improved Psych Appropriate mood and affect. Labs: 
   
Recent Labs 05/17/19 
0425 05/16/19 
5601 05/15/19 
2396 WBC 0.1* 0.1* 0.1*  
RBC 2.57* 2.22* 2.29* HGB 8.0* 7.0* 7.3* HCT 23.4* 21.1* 21.4* MCV 91.1 93.2 93.4 MCH 31.1 31.5 31.9 MCHC 34.2 33.8 34.1  
RDW 14.2 14.2 14.3 PLT 19* 31* 36* DF MANUAL MANUAL AUTOMATED Recent Labs 05/17/19 
0425 05/16/19 
7201 05/15/19 
5102 * 136 137  
K 4.8 4.9 4.5  
 102 105 CO2 28 28 24 AGAP 5* 6* 8  
* 139* 136* BUN 23 24* 27* CREA 0.65 0.69 0.79 GFRAA >60 >60 >60 GFRNA >60 >60 >60  
CA 8.9 9.1 8.9 SGOT 8* 6* 7* AP 52 51 52 TP 6.4 6.4 6.5 ALB 3.2 3.3 3.4 GLOB 3.2 3.1 3.1 AGRAT 1.0* 1.1* 1.1*  
MG 2.3 2.3 2.5* PHOS 4.6* 4.9* 4.9* Imaging: 
 
Medications: 
Current Facility-Administered Medications Medication Dose Route Frequency  potassium chloride (K-DUR, KLOR-CON) SR tablet 20 mEq  20 mEq Oral DAILY  midostaurin (RYDAPT) chemo capsule 50 mg (Patient Supplied)  50 mg Oral Q12H  
 sucralfate (CARAFATE) tablet 1 g  1 g Oral AC&HS  pantoprazole (PROTONIX) tablet 40 mg  40 mg Oral ACB  alum-mag hydroxide-simeth (MYLANTA) oral suspension 30 mL  30 mL Oral Q4H PRN  
 calcium acetate (PHOSLO) capsule 667 mg  1 Cap Oral TID WITH MEALS  magic mouthwash (RENÉ) oral suspension 5 mL  5 mL Oral AC&HS  morphine injection 2 mg  2 mg IntraVENous Q4H PRN  
 HYDROcodone-acetaminophen (NORCO) 5-325 mg per tablet 1 Tab  1 Tab Oral Q6H PRN  psyllium husk-aspartame (METAMUCIL FIBER) packet 1 Packet  1 Packet Oral BID PRN  
 central line flush (saline) syringe 10 mL  10 mL InterCATHeter PRN  
 heparin (porcine) pf 300 Units  300 Units InterCATHeter PRN  
 0.9% sodium chloride infusion 250 mL  250 mL IntraVENous PRN  polyethylene glycol (MIRALAX) packet 17 g  17 g Oral DAILY PRN  
 levoFLOXacin (LEVAQUIN) tablet 500 mg  500 mg Oral ACB  LORazepam (ATIVAN) injection 0.5 mg  0.5 mg IntraVENous Q6H PRN  
  acetaminophen (TYLENOL) tablet 650 mg  650 mg Oral PRN  
 diphenhydrAMINE (BENADRYL) capsule 25 mg  25 mg Oral PRN  
 acetaminophen/diphenhydrAMINE (TYLENOL PM EXT STR) 500/25 mg   Oral QHS  acyclovir (ZOVIRAX) tablet 400 mg  400 mg Oral BID  allopurinol (ZYLOPRIM) tablet 300 mg  300 mg Oral DAILY  calcium carbonate (OS-CHRIS) tablet 500 mg [elemental]  500 mg Oral TID WITH MEALS  escitalopram oxalate (LEXAPRO) tablet 10 mg  10 mg Oral DAILY  fluconazole (DIFLUCAN) tablet 200 mg  200 mg Oral DAILY  lidocaine-prilocaine (EMLA) 2.5-2.5 % cream   Topical PRN  
 LORazepam (ATIVAN) tablet 1 mg  1 mg Oral QHS  ondansetron (ZOFRAN ODT) tablet 8 mg  8 mg Oral Q8H PRN  pravastatin (PRAVACHOL) tablet 10 mg  10 mg Oral QHS  vit C-E-zinc cit-lutein-zeaxan 50 mg-15 unit- 4.5 mg-2.5 mg chew (OCU-ABDOUL) 1 Tab (Patient Supplied)  1 Tab Oral DAILY  ergocalciferol capsule 50,000 Units  50,000 Units Oral every Wednesday ASSESSMENT: 
 
Problem List  Date Reviewed: 4/30/2019 Codes Class Noted Acute myeloid leukemia not having achieved remission ([facility] ICD-10-CM: C92.00 ICD-9-CM: 205.00  5/9/2019 Admission for antineoplastic chemotherapy ICD-10-CM: Z51.11 ICD-9-CM: V58.11  5/5/2019 AML (acute myeloblastic leukemia) ([facility].) ICD-10-CM: C92.00 ICD-9-CM: 205.00  4/28/2019 Weakness generalized ICD-10-CM: R53.1 ICD-9-CM: 780.79  4/28/2019 Pancytopenia ([facility] ICD-10-CM: O30.385 ICD-9-CM: 284.19  4/28/2019 Thrombocytopenia ([facility] ICD-10-CM: D69.6 ICD-9-CM: 287.5  4/27/2019 PLAN: 
AML FLT 3+ 
5/6  BMbx planned for Tuesday then wait for [facility] from port results to determine start date of  cycle one 7 + 3 with Midostaurin day 8-21. Platelets will need to be at least 50k for bx tomorrow 5/7. BMbx today-platelets prior. Also needed prbc today for hgb 6.9. 
5/08 Start 7+3 when afebrile per Dr. Rosa Avila. 5/09 Day 1 of 7+3. Monitor TLS labs. 5/10 Day 2 7+3. Tolerating well. 5/13 Day 5 7+3. Midostaurin on the way. 5/16 Day 8 7+3. Day one Rydapt Pancytopenia related to chemotherapy 5/16 transfuse per Alexander SOPs 
  
Possible port infection 5/5 Day one vanc/cefe. Follow cultures. Do not use port. May need to have Echo if cultures positive. 5/6 BCNTD. Appears improved today. Continue to monitor. No fevers or other symptoms. 5/7 Day 3 vanc/cefe. Site appears even better today. 5/8 Does not appear infected. Site bruised. 5/9 BCx negative. Per ID okay to start treatment. 5/13 Completed 7 days Vanc/cef. Now on levaquin per ID. Neutropenic Fever 05/05 Pan-culture negative. On Vancomycin, Maxipime 05/08 Febrile overnight up to 102.5. Repeat cultures x 1. Continue antibiotics. Likely disease related. Consult ID for clearance. 05/09 BC remain negative. No fevers 48 hours. Continue vanc/cefe. 05/13 now only on LVQ per ID Bradycardia 5/14 EKG with no significant change. Alexander SOPs Supportive care ACV/Diflucan 
LVQ dc'd while on cefe and vanc Goals and plan of care reviewed with the patient. All questions answered to the best of our ability. Thania Avina NP 
 
[address] Office : [phone] Fax : [phone] Attending Addendum: 
Patient seen with NP.  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  TFQ tx course complicated by febrile neutropenia treated with vanc/cef and pt remains on LVQ.  Today, she is feeling well.  + BM.  at bedside.  I personally performed a face to face diagnostic evaluation on this patient.  My findings are as follows: A&ox3, lungs clear, heart regular, abdomen benign and ankle edema improved.  Midostaurin started D8 as planned through D21. TLS labs monitored.  Sinus irish on EKG - asymptomatic.  No qtc prolongation.  C/w supportive care and transfuse per protocol. Reflux better on PPI and carafate.   Cw LVQ.    
  
 I have reviewed and agree with the care plan.     
  
  
 
  
Soraya Cheng MD 
[facility] Hematology and Oncology [address] [place] [address] Office : [phone] Fax : [phone]

## 2019-05-17 NOTE — INTERDISCIPLINARY ROUNDS
Interdisciplinary rounds with charge nurse, provider,  and . 
  
Presenting Problem: AML - chemo Daily Goal: supportive care, monitor counts Expected Discharge Date:upon count recovery Expected Discharge Needs: none Patient/Family Concerns addressed

## 2019-05-18 ENCOUNTER — APPOINTMENT (OUTPATIENT)
Dept: GENERAL RADIOLOGY | Age: 74
DRG: 837 | End: 2019-05-18
Attending: INTERNAL MEDICINE
Payer: MEDICARE

## 2019-05-18 LAB
ALBUMIN SERPL-MCNC: 3.2 G/DL (ref 3.2–4.6)
ALBUMIN/GLOB SERPL: 1.2 {RATIO} (ref 1.2–3.5)
ALP SERPL-CCNC: 51 U/L (ref 50–136)
ALT SERPL-CCNC: 17 U/L (ref 12–65)
ANION GAP SERPL CALC-SCNC: 8 MMOL/L (ref 7–16)
AST SERPL-CCNC: 6 U/L (ref 15–37)
BILIRUB SERPL-MCNC: 0.8 MG/DL (ref 0.2–1.1)
BUN SERPL-MCNC: 23 MG/DL (ref 8–23)
CALCIUM SERPL-MCNC: 9.1 MG/DL (ref 8.3–10.4)
CHLORIDE SERPL-SCNC: 97 MMOL/L (ref 98–107)
CO2 SERPL-SCNC: 27 MMOL/L (ref 21–32)
CREAT SERPL-MCNC: 0.75 MG/DL (ref 0.6–1)
DIFFERENTIAL METHOD BLD: ABNORMAL
ERYTHROCYTE [DISTWIDTH] IN BLOOD BY AUTOMATED COUNT: 14.3 % (ref 11.9–14.6)
GLOBULIN SER CALC-MCNC: 2.7 G/DL (ref 2.3–3.5)
GLUCOSE SERPL-MCNC: 107 MG/DL (ref 65–100)
HCT VFR BLD AUTO: 23.2 % (ref 35.8–46.3)
HGB BLD-MCNC: 7.9 G/DL (ref 11.7–15.4)
LDH SERPL L TO P-CCNC: 160 U/L (ref 110–210)
MAGNESIUM SERPL-MCNC: 2.1 MG/DL (ref 1.8–2.4)
MCH RBC QN AUTO: 31 PG (ref 26.1–32.9)
MCHC RBC AUTO-ENTMCNC: 34.1 G/DL (ref 31.4–35)
MCV RBC AUTO: 91 FL (ref 79.6–97.8)
NRBC # BLD: 0 K/UL (ref 0–0.2)
PHOSPHATE SERPL-MCNC: 3.9 MG/DL (ref 2.3–3.7)
PLATELET # BLD AUTO: 11 K/UL (ref 150–450)
PLATELET COMMENTS,PCOM: ABNORMAL
PMV BLD AUTO: 10.5 FL (ref 9.4–12.3)
POTASSIUM SERPL-SCNC: 4.1 MMOL/L (ref 3.5–5.1)
PROT SERPL-MCNC: 5.9 G/DL (ref 6.3–8.2)
RBC # BLD AUTO: 2.55 M/UL (ref 4.05–5.2)
RBC MORPH BLD: ABNORMAL
SODIUM SERPL-SCNC: 132 MMOL/L (ref 136–145)
URATE SERPL-MCNC: 2.3 MG/DL (ref 2.6–6)
WBC # BLD AUTO: 0.1 K/UL (ref 4.3–11.1)
WBC MORPH BLD: ABNORMAL

## 2019-05-18 PROCEDURE — 77030020263 HC SOL INJ SOD CL0.9% LFCR 1000ML

## 2019-05-18 PROCEDURE — 87186 SC STD MICRODIL/AGAR DIL: CPT

## 2019-05-18 PROCEDURE — 85025 COMPLETE CBC W/AUTO DIFF WBC: CPT

## 2019-05-18 PROCEDURE — 74011250637 HC RX REV CODE- 250/637: Performed by: INTERNAL MEDICINE

## 2019-05-18 PROCEDURE — 99233 SBSQ HOSP IP/OBS HIGH 50: CPT | Performed by: INTERNAL MEDICINE

## 2019-05-18 PROCEDURE — 83615 LACTATE (LD) (LDH) ENZYME: CPT

## 2019-05-18 PROCEDURE — 87205 SMEAR GRAM STAIN: CPT

## 2019-05-18 PROCEDURE — 87086 URINE CULTURE/COLONY COUNT: CPT

## 2019-05-18 PROCEDURE — 83735 ASSAY OF MAGNESIUM: CPT

## 2019-05-18 PROCEDURE — 80053 COMPREHEN METABOLIC PANEL: CPT

## 2019-05-18 PROCEDURE — 74011000250 HC RX REV CODE- 250: Performed by: NURSE PRACTITIONER

## 2019-05-18 PROCEDURE — 84550 ASSAY OF BLOOD/URIC ACID: CPT

## 2019-05-18 PROCEDURE — 71045 X-RAY EXAM CHEST 1 VIEW: CPT

## 2019-05-18 PROCEDURE — 65270000029 HC RM PRIVATE

## 2019-05-18 PROCEDURE — 74011000258 HC RX REV CODE- 258: Performed by: INTERNAL MEDICINE

## 2019-05-18 PROCEDURE — 36591 DRAW BLOOD OFF VENOUS DEVICE: CPT

## 2019-05-18 PROCEDURE — 36415 COLL VENOUS BLD VENIPUNCTURE: CPT

## 2019-05-18 PROCEDURE — 74011250637 HC RX REV CODE- 250/637: Performed by: NURSE PRACTITIONER

## 2019-05-18 PROCEDURE — 74011250636 HC RX REV CODE- 250/636: Performed by: INTERNAL MEDICINE

## 2019-05-18 PROCEDURE — 84100 ASSAY OF PHOSPHORUS: CPT

## 2019-05-18 PROCEDURE — 74011250636 HC RX REV CODE- 250/636: Performed by: NURSE PRACTITIONER

## 2019-05-18 PROCEDURE — 87040 BLOOD CULTURE FOR BACTERIA: CPT

## 2019-05-18 RX ORDER — VANCOMYCIN HYDROCHLORIDE
1250 EVERY 12 HOURS
Status: DISCONTINUED | OUTPATIENT
Start: 2019-05-18 | End: 2019-05-22

## 2019-05-18 RX ORDER — VANCOMYCIN 2 GRAM/500 ML IN 0.9 % SODIUM CHLORIDE INTRAVENOUS
2000 ONCE
Status: COMPLETED | OUTPATIENT
Start: 2019-05-18 | End: 2019-05-19

## 2019-05-18 RX ORDER — SODIUM CHLORIDE 9 MG/ML
100 INJECTION, SOLUTION INTRAVENOUS CONTINUOUS
Status: DISPENSED | OUTPATIENT
Start: 2019-05-18 | End: 2019-05-18

## 2019-05-18 RX ADMIN — Medication 5 ML: at 08:40

## 2019-05-18 RX ADMIN — VANCOMYCIN HYDROCHLORIDE 2000 MG: 10 INJECTION, POWDER, LYOPHILIZED, FOR SOLUTION INTRAVENOUS at 09:29

## 2019-05-18 RX ADMIN — Medication 500 MG: at 17:03

## 2019-05-18 RX ADMIN — CEFEPIME HYDROCHLORIDE 2 G: 2 INJECTION, POWDER, FOR SOLUTION INTRAVENOUS at 17:03

## 2019-05-18 RX ADMIN — FLUCONAZOLE 200 MG: 100 TABLET ORAL at 08:43

## 2019-05-18 RX ADMIN — ONDANSETRON 8 MG: 8 TABLET, ORALLY DISINTEGRATING ORAL at 15:55

## 2019-05-18 RX ADMIN — CALCIUM ACETATE 667 MG: 667 CAPSULE ORAL at 13:01

## 2019-05-18 RX ADMIN — ALLOPURINOL 300 MG: 300 TABLET ORAL at 08:43

## 2019-05-18 RX ADMIN — LEVOFLOXACIN 500 MG: 500 TABLET, FILM COATED ORAL at 05:48

## 2019-05-18 RX ADMIN — SUCRALFATE 1 G: 1 TABLET ORAL at 13:01

## 2019-05-18 RX ADMIN — LORAZEPAM 1 MG: 1 TABLET ORAL at 22:29

## 2019-05-18 RX ADMIN — LORAZEPAM 0.5 MG: 2 INJECTION, SOLUTION INTRAMUSCULAR; INTRAVENOUS at 08:37

## 2019-05-18 RX ADMIN — ONDANSETRON 8 MG: 8 TABLET, ORALLY DISINTEGRATING ORAL at 03:00

## 2019-05-18 RX ADMIN — ACETAMINOPHEN: 500 TABLET, FILM COATED ORAL at 22:29

## 2019-05-18 RX ADMIN — SUCRALFATE 1 G: 1 TABLET ORAL at 08:43

## 2019-05-18 RX ADMIN — CHLORHEXIDINE GLUCONATE 15 ML: 1.2 RINSE ORAL at 17:03

## 2019-05-18 RX ADMIN — VANCOMYCIN HYDROCHLORIDE 1250 MG: 10 INJECTION, POWDER, LYOPHILIZED, FOR SOLUTION INTRAVENOUS at 22:33

## 2019-05-18 RX ADMIN — ESCITALOPRAM OXALATE 10 MG: 10 TABLET ORAL at 08:43

## 2019-05-18 RX ADMIN — PRAVASTATIN SODIUM 10 MG: 20 TABLET ORAL at 22:29

## 2019-05-18 RX ADMIN — Medication 500 MG: at 08:43

## 2019-05-18 RX ADMIN — Medication 5 ML: at 13:01

## 2019-05-18 RX ADMIN — CEFEPIME HYDROCHLORIDE 2 G: 2 INJECTION, POWDER, FOR SOLUTION INTRAVENOUS at 08:35

## 2019-05-18 RX ADMIN — ACYCLOVIR 400 MG: 800 TABLET ORAL at 17:03

## 2019-05-18 RX ADMIN — Medication 5 ML: at 17:03

## 2019-05-18 RX ADMIN — CALCIUM ACETATE 667 MG: 667 CAPSULE ORAL at 17:03

## 2019-05-18 RX ADMIN — Medication 5 ML: at 22:00

## 2019-05-18 RX ADMIN — ACYCLOVIR 400 MG: 800 TABLET ORAL at 08:44

## 2019-05-18 RX ADMIN — SUCRALFATE 1 G: 1 TABLET ORAL at 22:29

## 2019-05-18 RX ADMIN — ONDANSETRON 8 MG: 8 TABLET, ORALLY DISINTEGRATING ORAL at 22:42

## 2019-05-18 RX ADMIN — CALCIUM ACETATE 667 MG: 667 CAPSULE ORAL at 08:43

## 2019-05-18 RX ADMIN — PANTOPRAZOLE SODIUM 40 MG: 40 TABLET, DELAYED RELEASE ORAL at 08:43

## 2019-05-18 RX ADMIN — SODIUM CHLORIDE 100 ML/HR: 900 INJECTION, SOLUTION INTRAVENOUS at 13:01

## 2019-05-18 RX ADMIN — Medication 500 MG: at 13:01

## 2019-05-18 RX ADMIN — POTASSIUM CHLORIDE 20 MEQ: 20 TABLET, EXTENDED RELEASE ORAL at 08:44

## 2019-05-18 RX ADMIN — CHLORHEXIDINE GLUCONATE 15 ML: 1.2 RINSE ORAL at 08:43

## 2019-05-18 NOTE — PROGRESS NOTES
END OF SHIFT NOTE: 
 
Intake/Output 05/17 1901 - 05/18 0700 In: 61 [P.O.:60] Out: 600 [Urine:600] Voiding: YES Catheter: NO 
Drain:   
 
 
 
 
 
Stool:  0 occurrences. Stool Assessment Stool Color: Candy Barbone (05/11/19 1606) Stool Appearance: Formed (05/11/19 1606) Stool Amount: Small (05/11/19 1606) Stool Source/Status: Rectum (05/11/19 1606) Emesis:  2 occurrences. Emesis Assessment Appearance: Undigested food (05/18/19 0258) Emesis Amount: Small (05/18/19 0258) VITAL SIGNS Patient Vitals for the past 12 hrs: 
 Temp Pulse Resp BP SpO2  
05/18/19 0255 98.8 °F (37.1 °C) (!) 55 19 140/57 94 % 05/17/19 2322 99.3 °F (37.4 °C) 64 16 117/51 94 % 05/17/19 1923 98.7 °F (37.1 °C) (!) 59 16 118/56 95 % Pain Assessment Pain 1 Pain Scale 1: Numeric (0 - 10) (05/17/19 2000) Pain Intensity 1: 0 (05/17/19 2000) Patient Stated Pain Goal: 0 (05/17/19 2000) Pain Reassessment 1: Yes (05/17/19 1655) Pain Onset 1: now (05/17/19 1600) Pain Location 1: Chest (05/17/19 1600) Pain Orientation 1: Mid (05/17/19 1600) Pain Description 1: Sore (05/17/19 1600) Pain Intervention(s) 1: Medication (see MAR) (05/17/19 1600) Ambulating Yes Additional Information: Afebrile. Intermittent bradycardia. x2 n/v episodes over night. Undigested food. Zofran ODT given. Pt resting quietly. No visible s/sx of distress. VSS. Shift report will be given to oncoming nurse at the bedside.  
 
Pal Ulrich RN

## 2019-05-18 NOTE — PROGRESS NOTES
Pharmacokinetic Consult to Pharmacist 
 
Alex David is a 68 y.o. female being treated for febrile neutropenia with vancomycin and cefepime. Height: 5' 6\" (167.6 cm)  Weight: 87 kg (191 lb 11.2 oz) Lab Results Component Value Date/Time BUN 23 05/18/2019 03:06 AM  
 Creatinine 0.75 05/18/2019 03:06 AM  
 WBC 0.1 (LL) 05/18/2019 03:06 AM  
  
Estimated Creatinine Clearance: 74.2 mL/min (based on SCr of 0.75 mg/dL). Day 1 of vancomycin. Goal trough is 15-20. Dosing started with 2g x 1 and then 1.25g q12h. PO Levofloxacin for immunocompromised host prophylaxis stopped while on cefepime. Will continue to follow patient. Thank you, Jose Price, Pharm. D. Clinical Pharmacist 
[phone]

## 2019-05-18 NOTE — PROGRESS NOTES
END OF SHIFT NOTE: 
 
Intake/Output 05/18 0701 - 05/18 1900 In: 0843 [P.O.:120; I.V.:921] Out: 200 [Urine:200] Voiding: YES Catheter: NO 
Drain:   
 
 
 
 
 
Stool:  1 occurrences. Stool Assessment Stool Color: Voncile Player (05/18/19 0911) Stool Appearance: Formed; Soft (05/18/19 0911) Stool Amount: Small (05/18/19 0911) Stool Source/Status: Rectum (05/18/19 0911) Emesis:  0 occurrences. Emesis Assessment Appearance: Undigested food (05/18/19 0258) Emesis Amount: Small (05/18/19 0258) VITAL SIGNS Patient Vitals for the past 12 hrs: 
 Temp Pulse Resp BP SpO2  
05/18/19 1515 100.2 °F (37.9 °C) 66 14 125/45 93 % 05/18/19 1113 99.8 °F (37.7 °C) 69 17 119/50 90 % 05/18/19 0804 (!) 100.5 °F (38.1 °C) 63 19 127/58 94 % Pain Assessment Pain 1 Pain Scale 1: Numeric (0 - 10) (05/18/19 1549) Pain Intensity 1: 0 (05/18/19 1549) Patient Stated Pain Goal: 0 (05/18/19 1549) Pain Reassessment 1: Yes (05/18/19 1549) Pain Onset 1: now (05/17/19 1600) Pain Location 1: Chest (05/17/19 1600) Pain Orientation 1: Mid (05/17/19 1600) Pain Description 1: Sore (05/17/19 1600) Pain Intervention(s) 1: Medication (see MAR) (05/17/19 1600) Ambulating Yes Additional Information: Pt very nauseated today. First fever today Cultures drawn, urine collected, ABX started, CXR completed. No complaints noted at this time. Shift report given to [facility], RN oncoming nurse at the bedside.  
 
Oh Camp RN

## 2019-05-18 NOTE — PROGRESS NOTES
[facility] Hematology & Oncology Inpatient Hematology / Oncology Progress Note Admission Date: 2019 10:53 AM 
Reason for Admission/Hospital Course: Admission for antineoplastic chemotherapy [Z51.11] 24 Hour Events: VSS/afebrile Day 10 of 7+3 First fever this morning with t-max 100.5, other vitals stable Nausea/vomiting overnight ROS: 
Constitutional:  negative for fever, chills. +weakness, malaise, fatigue. CV:  negative for chest pain, palpitations, edema. Respiratory: negative for dyspnea, cough, wheezing. GI: negative abdominal pain, diarrhea. + nausea/vomiting. 10 point review of systems is otherwise negative with the exception of the elements mentioned above in the HPI. No Known Allergies OBJECTIVE: 
Patient Vitals for the past 8 hrs: 
 BP Temp Pulse Resp SpO2  
19 1113 119/50 99.8 °F (37.7 °C) 69 17 90 % 19 0804 127/58 (!) 100.5 °F (38.1 °C) 63 19 94 % Temp (24hrs), Av.3 °F (37.4 °C), Min:98.7 °F (37.1 °C), Max:100.5 °F (38.1 °C) 
 
 0701 -  1900 In: 120 [P.O.:120] Out: 100 [Urine:100] Physical Exam: 
Constitutional: Well developed, well nourished female in no acute distress, sitting comfortably in bed HEENT: Normocephalic and atraumatic. Oropharynx is clear, mucous membranes are moist. Extraocular muscles are intact. Sclerae anicteric. Skin Warm and dry. No rash noted. No erythema. No pallor. Respiratory Lungs are clear to auscultation bilaterally without wheezes, rales or rhonchi, normal air exchange without accessory muscle use. CVS Bradycardia, regular rhythm and normal S1 and S2. No murmurs, gallops, or rubs. Abdomen Soft, nontender and nondistended, normoactive bowel sounds. Neuro Grossly nonfocal with no obvious sensory or motor deficits. MSK Normal range of motion in general. BLE edema improved Psych Appropriate mood and affect. Labs: 
   
Recent Labs 19 
0306 19 0425 05/16/19 
7043 WBC 0.1* 0.1* 0.1*  
RBC 2.55* 2.57* 2.22* HGB 7.9* 8.0* 7.0*  
HCT 23.2* 23.4* 21.1*  
MCV 91.0 91.1 93.2 MCH 31.0 31.1 31.5 MCHC 34.1 34.2 33.8  
RDW 14.3 14.2 14.2 PLT 11* 19* 31* DF MANUAL MANUAL MANUAL Recent Labs 05/18/19 
2613 05/17/19 
0425 05/16/19 
4703 * 134* 136  
K 4.1 4.8 4.9 CL 97* 101 102 CO2 27 28 28 AGAP 8 5* 6*  
* 129* 139* BUN 23 23 24* CREA 0.75 0.65 0.69 GFRAA >60 >60 >60 GFRNA >60 >60 >60  
CA 9.1 8.9 9.1 SGOT 6* 8* 6* AP 51 52 51  
TP 5.9* 6.4 6.4 ALB 3.2 3.2 3.3 GLOB 2.7 3.2 3.1 AGRAT 1.2 1.0* 1.1*  
MG 2.1 2.3 2.3 PHOS 3.9* 4.6* 4.9* Imaging: XR CHEST SNGL V [[id number]] Collected: 05/18/19 0855 Order Status: Completed Updated: 05/18/19 1229 Narrative:    
EXAM: Single view chest radiograph. INDICATION: 73 years Fever COMPARISON: None. FINDINGS: 
No focal lung opacity. No pneumothorax. No pleural effusion. The heart, 
mediastinum, jhoana, and pulmonary vasculature are within normal limits. No 
evidence of acute osseous abnormality. Right-sided chest port with tip 
terminating in the right atrium. Impression:    
IMPRESSION:  
1. No evidence of pneumonia. Medications: 
Current Facility-Administered Medications Medication Dose Route Frequency  cefepime (MAXIPIME) 2 g in 0.9% sodium chloride (MBP/ADV) 100 mL  2 g IntraVENous Q8H  
 vancomycin (VANCOCIN) 1250 mg in  ml infusion  1,250 mg IntraVENous Q12H  
 0.9% sodium chloride infusion  100 mL/hr IntraVENous CONTINUOUS  
 potassium chloride (K-DUR, KLOR-CON) SR tablet 20 mEq  20 mEq Oral DAILY  chlorhexidine (PERIDEX) 0.12 % mouthwash 15 mL  15 mL Oral BID  midostaurin (RYDAPT) chemo capsule 50 mg (Patient Supplied)  50 mg Oral Q12H  
 sucralfate (CARAFATE) tablet 1 g  1 g Oral AC&HS  pantoprazole (PROTONIX) tablet 40 mg  40 mg Oral ACB  alum-mag hydroxide-simeth (MYLANTA) oral suspension 30 mL  30 mL Oral Q4H PRN  
 calcium acetate (PHOSLO) capsule 667 mg  1 Cap Oral TID WITH MEALS  magic mouthwash (RENÉ) oral suspension 5 mL  5 mL Oral AC&HS  morphine injection 2 mg  2 mg IntraVENous Q4H PRN  
 HYDROcodone-acetaminophen (NORCO) 5-325 mg per tablet 1 Tab  1 Tab Oral Q6H PRN  psyllium husk-aspartame (METAMUCIL FIBER) packet 1 Packet  1 Packet Oral BID PRN  
 central line flush (saline) syringe 10 mL  10 mL InterCATHeter PRN  
 heparin (porcine) pf 300 Units  300 Units InterCATHeter PRN  
 0.9% sodium chloride infusion 250 mL  250 mL IntraVENous PRN  polyethylene glycol (MIRALAX) packet 17 g  17 g Oral DAILY PRN  
 LORazepam (ATIVAN) injection 0.5 mg  0.5 mg IntraVENous Q6H PRN  
 acetaminophen (TYLENOL) tablet 650 mg  650 mg Oral PRN  
 diphenhydrAMINE (BENADRYL) capsule 25 mg  25 mg Oral PRN  
 acetaminophen/diphenhydrAMINE (TYLENOL PM EXT STR) 500/25 mg   Oral QHS  acyclovir (ZOVIRAX) tablet 400 mg  400 mg Oral BID  allopurinol (ZYLOPRIM) tablet 300 mg  300 mg Oral DAILY  calcium carbonate (OS-CHRIS) tablet 500 mg [elemental]  500 mg Oral TID WITH MEALS  escitalopram oxalate (LEXAPRO) tablet 10 mg  10 mg Oral DAILY  fluconazole (DIFLUCAN) tablet 200 mg  200 mg Oral DAILY  lidocaine-prilocaine (EMLA) 2.5-2.5 % cream   Topical PRN  
 LORazepam (ATIVAN) tablet 1 mg  1 mg Oral QHS  ondansetron (ZOFRAN ODT) tablet 8 mg  8 mg Oral Q8H PRN  pravastatin (PRAVACHOL) tablet 10 mg  10 mg Oral QHS  vit C-E-zinc cit-lutein-zeaxan 50 mg-15 unit- 4.5 mg-2.5 mg chew (OCU-ABDOUL) 1 Tab (Patient Supplied)  1 Tab Oral DAILY  ergocalciferol capsule 50,000 Units  50,000 Units Oral every Wednesday ASSESSMENT: 
 
Problem List  Date Reviewed: 4/30/2019 Codes Class Noted Acute myeloid leukemia not having achieved remission ([facility] ICD-10-CM: C92.00 ICD-9-CM: 205.00  5/9/2019 Admission for antineoplastic chemotherapy ICD-10-CM: Z51.11 ICD-9-CM: V58.11  5/5/2019 AML (acute myeloblastic leukemia) ([facility] ICD-10-CM: C92.00 ICD-9-CM: 205.00  4/28/2019 Weakness generalized ICD-10-CM: R53.1 ICD-9-CM: 780.79  4/28/2019 Pancytopenia ([facility] ICD-10-CM: L72.427 ICD-9-CM: 284.19  4/28/2019 Thrombocytopenia ([facility] ICD-10-CM: D69.6 ICD-9-CM: 287.5  4/27/2019 PLAN: 
AML FLT 3+ 
5/6  BMbx planned for Tuesday then wait for [facility] from port results to determine start date of  cycle one 7 + 3 with Midostaurin day 8-21. Platelets will need to be at least 50k for bx tomorrow 5/7. BMbx today-platelets prior. Also needed prbc today for hgb 6.9. 
5/08 Start 7+3 when afebrile per Dr. Adelia Skiff. 5/09 Day 1 of 7+3. Monitor TLS labs. 5/10 Day 2 7+3. Tolerating well. 5/13 Day 5 7+3. Midostaurin on the way. 5/16 Day 8 7+3. Day one Rydapt. 5/18 Day 10 7+3, day 3 Rydapt. Pancytopenia related to chemotherapy 5/16 transfuse per Alexander SOPs 
  
Possible port infection 5/5 Day one vanc/cefe. Follow cultures. Do not use port. May need to have Echo if cultures positive. 5/6 BCNTD. Appears improved today. Continue to monitor. No fevers or other symptoms. 5/7 Day 3 vanc/cefe. Site appears even better today. 5/8 Does not appear infected. Site bruised. 5/9 BCx negative. Per ID okay to start treatment. 5/13 Completed 7 days Vanc/cef. Now on levaquin per ID. Neutropenic Fever 05/05 Pan-culture negative. On Vancomycin, Maxipime 05/08 Febrile overnight up to 102.5. Repeat cultures x 1. Continue antibiotics. Likely disease related. Consult ID for clearance. 05/09 BC remain negative. No fevers 48 hours. Continue vanc/cefe. 05/13 now only on LVQ per ID. 5/18 Neutropenic fever - 100.5. Pan-cultures obtained. Chest xray negative. Started on Vanc/Cef. Bradycardia 5/14 EKG with no significant change. Alexander SOPs Supportive care ACV/Diflucan 
LVQ dc'd while on cefe and vanc Goals and plan of care reviewed with the patient.   All questions answered to the best of our ability. ARMANDO Corrigan Yousuf [id number] [place] [address] Office : [phone] Fax : [phone] Attending Addendum: 
Patient seen with NP.  Pt with newly diagnosed AML FLT3+ on 7+3 B58.  [facility] tx course complicated by febrile neutropenia treated with vanc/cef and pt remains on LVQ.  Overnight, febrile neutropenia. Cultures drawn/antibiotics changed from LVQ to vanc/cef. Cultures pending. She is feeling weak this am, had nausea last night.  at bedside. I personally performed a face to face diagnostic evaluation on this patient.  My findings are as follows: A&ox3, lungs clear, heart regular, abdomen benign and ankle edema improved.  Midostaurin started D8 as planned through D21. TLS labs monitored.  Sinus irish on EKG - asymptomatic.  No qtc prolongation.  C/w supportive care and transfuse per protocol. Reflux better on PPI and carafate.      
  
I have reviewed and agree with the care plan.     
  
  
 
  
Floyd Headley MD 
[facility] Hematology and Oncology [address] [place] [address] Office : [phone] Fax : [phone]

## 2019-05-18 NOTE — PROGRESS NOTES
Problem: Falls - Risk of 
Goal: *Absence of Falls Description Document Mamie Patches Fall Risk and appropriate interventions in the flowsheet. Outcome: Progressing Towards Goal 
  
Problem: Discharge Planning Goal: *Discharge to safe environment Outcome: Progressing Towards Goal 
  
Problem: Anemia Care Plan (Adult and Pediatric) Goal: *Labs within defined limits Outcome: Progressing Towards Goal

## 2019-05-18 NOTE — PROGRESS NOTES
Problem: Falls - Risk of 
Goal: *Absence of Falls Description Document Kayley Miller Fall Risk and appropriate interventions in the flowsheet. Outcome: Progressing Towards Goal 
Note:  
Fall Risk Interventions: 
  
 
  
 
Medication Interventions: Evaluate medications/consider consulting pharmacy, Teach patient to arise slowly History of Falls Interventions: Consult care management for discharge planning, Evaluate medications/consider consulting pharmacy

## 2019-05-19 LAB
ALBUMIN SERPL-MCNC: 2.4 G/DL (ref 3.2–4.6)
ALBUMIN/GLOB SERPL: 0.9 {RATIO} (ref 1.2–3.5)
ALP SERPL-CCNC: 44 U/L (ref 50–136)
ALT SERPL-CCNC: 10 U/L (ref 12–65)
ANION GAP SERPL CALC-SCNC: 6 MMOL/L (ref 7–16)
AST SERPL-CCNC: 4 U/L (ref 15–37)
BILIRUB SERPL-MCNC: 0.4 MG/DL (ref 0.2–1.1)
BUN SERPL-MCNC: 15 MG/DL (ref 8–23)
CALCIUM SERPL-MCNC: 8.1 MG/DL (ref 8.3–10.4)
CHLORIDE SERPL-SCNC: 104 MMOL/L (ref 98–107)
CO2 SERPL-SCNC: 25 MMOL/L (ref 21–32)
CREAT SERPL-MCNC: 0.7 MG/DL (ref 0.6–1)
DIFFERENTIAL METHOD BLD: ABNORMAL
ERYTHROCYTE [DISTWIDTH] IN BLOOD BY AUTOMATED COUNT: 14.6 % (ref 11.9–14.6)
GLOBULIN SER CALC-MCNC: 2.8 G/DL (ref 2.3–3.5)
GLUCOSE SERPL-MCNC: 97 MG/DL (ref 65–100)
HCT VFR BLD AUTO: 20.1 % (ref 35.8–46.3)
HGB BLD-MCNC: 6.8 G/DL (ref 11.7–15.4)
LDH SERPL L TO P-CCNC: 119 U/L (ref 110–210)
MAGNESIUM SERPL-MCNC: 2.3 MG/DL (ref 1.8–2.4)
MCH RBC QN AUTO: 31.1 PG (ref 26.1–32.9)
MCHC RBC AUTO-ENTMCNC: 33.8 G/DL (ref 31.4–35)
MCV RBC AUTO: 91.8 FL (ref 79.6–97.8)
NRBC # BLD: 0 K/UL (ref 0–0.2)
PHOSPHATE SERPL-MCNC: 4 MG/DL (ref 2.3–3.7)
PLATELET # BLD AUTO: 4 K/UL (ref 150–450)
PLATELET COMMENTS,PCOM: ABNORMAL
PMV BLD AUTO: 9.8 FL (ref 9.4–12.3)
POTASSIUM SERPL-SCNC: 4.1 MMOL/L (ref 3.5–5.1)
PROT SERPL-MCNC: 5.2 G/DL (ref 6.3–8.2)
RBC # BLD AUTO: 2.19 M/UL (ref 4.05–5.2)
RBC MORPH BLD: ABNORMAL
SODIUM SERPL-SCNC: 135 MMOL/L (ref 136–145)
URATE SERPL-MCNC: 1.7 MG/DL (ref 2.6–6)
VANCOMYCIN TROUGH SERPL-MCNC: 14.4 UG/ML (ref 5–20)
WBC # BLD AUTO: 0.1 K/UL (ref 4.3–11.1)
WBC MORPH BLD: ABNORMAL

## 2019-05-19 PROCEDURE — 36430 TRANSFUSION BLD/BLD COMPNT: CPT

## 2019-05-19 PROCEDURE — 74011250637 HC RX REV CODE- 250/637: Performed by: NURSE PRACTITIONER

## 2019-05-19 PROCEDURE — 74011000258 HC RX REV CODE- 258: Performed by: INTERNAL MEDICINE

## 2019-05-19 PROCEDURE — 74011000250 HC RX REV CODE- 250: Performed by: NURSE PRACTITIONER

## 2019-05-19 PROCEDURE — P9040 RBC LEUKOREDUCED IRRADIATED: HCPCS

## 2019-05-19 PROCEDURE — 84550 ASSAY OF BLOOD/URIC ACID: CPT

## 2019-05-19 PROCEDURE — 77030020263 HC SOL INJ SOD CL0.9% LFCR 1000ML

## 2019-05-19 PROCEDURE — 74011250637 HC RX REV CODE- 250/637: Performed by: INTERNAL MEDICINE

## 2019-05-19 PROCEDURE — 86644 CMV ANTIBODY: CPT

## 2019-05-19 PROCEDURE — 80053 COMPREHEN METABOLIC PANEL: CPT

## 2019-05-19 PROCEDURE — 74011250636 HC RX REV CODE- 250/636: Performed by: INTERNAL MEDICINE

## 2019-05-19 PROCEDURE — 84100 ASSAY OF PHOSPHORUS: CPT

## 2019-05-19 PROCEDURE — 80202 ASSAY OF VANCOMYCIN: CPT

## 2019-05-19 PROCEDURE — 83615 LACTATE (LD) (LDH) ENZYME: CPT

## 2019-05-19 PROCEDURE — 83735 ASSAY OF MAGNESIUM: CPT

## 2019-05-19 PROCEDURE — 99233 SBSQ HOSP IP/OBS HIGH 50: CPT | Performed by: INTERNAL MEDICINE

## 2019-05-19 PROCEDURE — 36591 DRAW BLOOD OFF VENOUS DEVICE: CPT

## 2019-05-19 PROCEDURE — P9037 PLATE PHERES LEUKOREDU IRRAD: HCPCS

## 2019-05-19 PROCEDURE — 65270000029 HC RM PRIVATE

## 2019-05-19 PROCEDURE — 77030039270 HC TU BLD FLTR CARD -A

## 2019-05-19 PROCEDURE — 85025 COMPLETE CBC W/AUTO DIFF WBC: CPT

## 2019-05-19 RX ORDER — SODIUM CHLORIDE 9 MG/ML
250 INJECTION, SOLUTION INTRAVENOUS AS NEEDED
Status: DISCONTINUED | OUTPATIENT
Start: 2019-05-19 | End: 2019-05-29

## 2019-05-19 RX ADMIN — Medication 5 ML: at 11:13

## 2019-05-19 RX ADMIN — CEFEPIME HYDROCHLORIDE 2 G: 2 INJECTION, POWDER, FOR SOLUTION INTRAVENOUS at 09:15

## 2019-05-19 RX ADMIN — ACYCLOVIR 400 MG: 800 TABLET ORAL at 17:35

## 2019-05-19 RX ADMIN — ALLOPURINOL 300 MG: 300 TABLET ORAL at 09:15

## 2019-05-19 RX ADMIN — DIPHENHYDRAMINE HYDROCHLORIDE 25 MG: 25 CAPSULE ORAL at 11:18

## 2019-05-19 RX ADMIN — Medication 5 ML: at 21:27

## 2019-05-19 RX ADMIN — SUCRALFATE 1 G: 1 TABLET ORAL at 16:30

## 2019-05-19 RX ADMIN — Medication 5 ML: at 16:30

## 2019-05-19 RX ADMIN — Medication 500 MG: at 11:18

## 2019-05-19 RX ADMIN — CEFEPIME HYDROCHLORIDE 2 G: 2 INJECTION, POWDER, FOR SOLUTION INTRAVENOUS at 01:30

## 2019-05-19 RX ADMIN — FLUCONAZOLE 200 MG: 100 TABLET ORAL at 09:16

## 2019-05-19 RX ADMIN — CALCIUM ACETATE 667 MG: 667 CAPSULE ORAL at 17:35

## 2019-05-19 RX ADMIN — CALCIUM ACETATE 667 MG: 667 CAPSULE ORAL at 07:50

## 2019-05-19 RX ADMIN — ESCITALOPRAM OXALATE 10 MG: 10 TABLET ORAL at 09:15

## 2019-05-19 RX ADMIN — ACETAMINOPHEN: 500 TABLET, FILM COATED ORAL at 21:11

## 2019-05-19 RX ADMIN — ACYCLOVIR 400 MG: 800 TABLET ORAL at 09:15

## 2019-05-19 RX ADMIN — CEFEPIME HYDROCHLORIDE 2 G: 2 INJECTION, POWDER, FOR SOLUTION INTRAVENOUS at 17:35

## 2019-05-19 RX ADMIN — SUCRALFATE 1 G: 1 TABLET ORAL at 21:23

## 2019-05-19 RX ADMIN — SUCRALFATE 1 G: 1 TABLET ORAL at 11:18

## 2019-05-19 RX ADMIN — CHLORHEXIDINE GLUCONATE 15 ML: 1.2 RINSE ORAL at 17:35

## 2019-05-19 RX ADMIN — ONDANSETRON 8 MG: 8 TABLET, ORALLY DISINTEGRATING ORAL at 07:54

## 2019-05-19 RX ADMIN — CHLORHEXIDINE GLUCONATE 15 ML: 1.2 RINSE ORAL at 09:16

## 2019-05-19 RX ADMIN — VANCOMYCIN HYDROCHLORIDE 1250 MG: 10 INJECTION, POWDER, LYOPHILIZED, FOR SOLUTION INTRAVENOUS at 11:18

## 2019-05-19 RX ADMIN — PRAVASTATIN SODIUM 10 MG: 20 TABLET ORAL at 21:11

## 2019-05-19 RX ADMIN — Medication 5 ML: at 07:52

## 2019-05-19 RX ADMIN — CALCIUM ACETATE 667 MG: 667 CAPSULE ORAL at 11:18

## 2019-05-19 RX ADMIN — SUCRALFATE 1 G: 1 TABLET ORAL at 07:50

## 2019-05-19 RX ADMIN — VANCOMYCIN HYDROCHLORIDE 1250 MG: 10 INJECTION, POWDER, LYOPHILIZED, FOR SOLUTION INTRAVENOUS at 23:02

## 2019-05-19 RX ADMIN — PANTOPRAZOLE SODIUM 40 MG: 40 TABLET, DELAYED RELEASE ORAL at 07:50

## 2019-05-19 RX ADMIN — POTASSIUM CHLORIDE 20 MEQ: 20 TABLET, EXTENDED RELEASE ORAL at 09:15

## 2019-05-19 RX ADMIN — Medication 500 MG: at 07:50

## 2019-05-19 RX ADMIN — ACETAMINOPHEN 650 MG: 325 TABLET, FILM COATED ORAL at 11:18

## 2019-05-19 RX ADMIN — Medication 500 MG: at 17:35

## 2019-05-19 RX ADMIN — LORAZEPAM 1 MG: 1 TABLET ORAL at 21:12

## 2019-05-19 NOTE — PROGRESS NOTES
EOS Note: ([id number]) VSS, afebrile Pain: No c/o pain during the shift. Neuro: A&Ox4. No c/o numbness or tingling. Cardio: S1/S2. SR. CV VS WNL for specified parameters. Resp: RA. Clear bilaterally. Symmetric chest wall excursion. Resp VS WNL. GI/: Voiding. Urine is yellow and clear. Last BM: 5/19/19. Stool is brown and formed. Intermittent c/o nausea this morning and Zofran PO administered x 1 dose. Diet: Tolerating diet. Encouraging ensure supplements. See I&O for further details. Ambulation: Pt ambulates independently to bathroom. Ambulated hallways once this shift. No falls during the shift. Additional Information: Day 11 of 7+3; Day 4 of Rydapt. Pt is vigilant with mouth care. 1 unit of platelets and 1 unit of PRBCs administered. Vanc trough at 2200 this evening. Patiently resting quietly without complaint. Continue with POC.

## 2019-05-19 NOTE — PROGRESS NOTES
[facility] Inpatient Hematology / Oncology Progress Note Admission Date: 2019 10:53 AM 
Reason for Admission/Hospital Course: Admission for antineoplastic chemotherapy [Z51.11] 24 Hour Events: VSS/afebrile Day 11 post 7+3 Nausea/vomiting/loose stools improved ROS: 
Constitutional:  negative for fever, chills. +weakness, malaise, fatigue. CV:  negative for chest pain, palpitations, edema. Respiratory: negative for dyspnea, cough, wheezing. GI: negative abdominal pain, diarrhea. + nausea/vomiting. 10 point review of systems is otherwise negative with the exception of the elements mentioned above in the HPI. No Known Allergies OBJECTIVE: 
Patient Vitals for the past 8 hrs: 
 BP Temp Pulse Resp SpO2  
19 1234 116/52 98.3 °F (36.8 °C) 68 16   
19 1144 118/56 98.4 °F (36.9 °C) 67 16   
19 1111 119/49 98.8 °F (37.1 °C) 68 17 95 % 19 0713 129/50 99.3 °F (37.4 °C) 68 16 96 % Temp (24hrs), Av.2 °F (37.3 °C), Min:98.3 °F (36.8 °C), Max:100.2 °F (37.9 °C) 
 
 0701 -  1900 In: 2961 [P.O.:600; I.V.:1829] Out: - Physical Exam: 
Constitutional: Well developed, well nourished female in no acute distress, sitting comfortably in bed HEENT: Normocephalic and atraumatic. Oropharynx is clear, mucous membranes are moist. Extraocular muscles are intact. Sclerae anicteric. Skin Warm and dry. No rash noted. No erythema. No pallor. Respiratory Lungs are clear to auscultation bilaterally without wheezes, rales or rhonchi, normal air exchange without accessory muscle use. CVS Normal rate, regular rhythm and normal S1 and S2. No murmurs, gallops, or rubs. Abdomen Soft, nontender and nondistended, normoactive bowel sounds. Neuro Grossly nonfocal with no obvious sensory or motor deficits. MSK Normal range of motion in general. BLE edema improved Psych Appropriate mood and affect. Labs: Recent Labs 05/19/19 
1667 05/18/19 
0318 05/17/19 
0425 WBC 0.1* 0.1* 0.1*  
RBC 2.19* 2.55* 2.57* HGB 6.8* 7.9* 8.0*  
HCT 20.1* 23.2* 23.4* MCV 91.8 91.0 91.1 MCH 31.1 31.0 31.1 MCHC 33.8 34.1 34.2  
RDW 14.6 14.3 14.2 PLT 4* 11* 19* DF MANUAL MANUAL MANUAL Recent Labs 05/19/19 
7775 05/18/19 
9351 05/17/19 
0425 * 132* 134* K 4.1 4.1 4.8  
 97* 101 CO2 25 27 28 AGAP 6* 8 5* GLU 97 107* 129* BUN 15 23 23 CREA 0.70 0.75 0.65 GFRAA >60 >60 >60 GFRNA >60 >60 >60  
CA 8.1* 9.1 8.9 SGOT 4* 6* 8* AP 44* 51 52 TP 5.2* 5.9* 6.4 ALB 2.4* 3.2 3.2 GLOB 2.8 2.7 3.2 AGRAT 0.9* 1.2 1.0*  
MG 2.3 2.1 2.3 PHOS 4.0* 3.9* 4.6* Imaging: XR CHEST SNGL V [[id number]] Collected: 05/18/19 0855 Order Status: Completed Updated: 05/18/19 6546 Narrative:    
EXAM: Single view chest radiograph. INDICATION: 73 years Fever COMPARISON: None. FINDINGS: 
No focal lung opacity. No pneumothorax. No pleural effusion. The heart, 
mediastinum, jhoana, and pulmonary vasculature are within normal limits. No 
evidence of acute osseous abnormality. Right-sided chest port with tip 
terminating in the right atrium. Impression:    
IMPRESSION:  
1. No evidence of pneumonia. Medications: 
Current Facility-Administered Medications Medication Dose Route Frequency  0.9% sodium chloride infusion 250 mL  250 mL IntraVENous PRN  Vancomycin Trough Level Reminder   Other ONCE  
 cefepime (MAXIPIME) 2 g in 0.9% sodium chloride (MBP/ADV) 100 mL  2 g IntraVENous Q8H  
 vancomycin (VANCOCIN) 1250 mg in  ml infusion  1,250 mg IntraVENous Q12H  potassium chloride (K-DUR, KLOR-CON) SR tablet 20 mEq  20 mEq Oral DAILY  chlorhexidine (PERIDEX) 0.12 % mouthwash 15 mL  15 mL Oral BID  midostaurin (RYDAPT) chemo capsule 50 mg (Patient Supplied)  50 mg Oral Q12H  
 sucralfate (CARAFATE) tablet 1 g  1 g Oral AC&HS  
  pantoprazole (PROTONIX) tablet 40 mg  40 mg Oral ACB  alum-mag hydroxide-simeth (MYLANTA) oral suspension 30 mL  30 mL Oral Q4H PRN  
 calcium acetate (PHOSLO) capsule 667 mg  1 Cap Oral TID WITH MEALS  magic mouthwash (RENÉ) oral suspension 5 mL  5 mL Oral AC&HS  morphine injection 2 mg  2 mg IntraVENous Q4H PRN  
 HYDROcodone-acetaminophen (NORCO) 5-325 mg per tablet 1 Tab  1 Tab Oral Q6H PRN  psyllium husk-aspartame (METAMUCIL FIBER) packet 1 Packet  1 Packet Oral BID PRN  
 central line flush (saline) syringe 10 mL  10 mL InterCATHeter PRN  
 heparin (porcine) pf 300 Units  300 Units InterCATHeter PRN  
 0.9% sodium chloride infusion 250 mL  250 mL IntraVENous PRN  polyethylene glycol (MIRALAX) packet 17 g  17 g Oral DAILY PRN  
 LORazepam (ATIVAN) injection 0.5 mg  0.5 mg IntraVENous Q6H PRN  
 acetaminophen (TYLENOL) tablet 650 mg  650 mg Oral PRN  
 diphenhydrAMINE (BENADRYL) capsule 25 mg  25 mg Oral PRN  
 acetaminophen/diphenhydrAMINE (TYLENOL PM EXT STR) 500/25 mg   Oral QHS  acyclovir (ZOVIRAX) tablet 400 mg  400 mg Oral BID  allopurinol (ZYLOPRIM) tablet 300 mg  300 mg Oral DAILY  calcium carbonate (OS-CHRIS) tablet 500 mg [elemental]  500 mg Oral TID WITH MEALS  escitalopram oxalate (LEXAPRO) tablet 10 mg  10 mg Oral DAILY  fluconazole (DIFLUCAN) tablet 200 mg  200 mg Oral DAILY  lidocaine-prilocaine (EMLA) 2.5-2.5 % cream   Topical PRN  
 LORazepam (ATIVAN) tablet 1 mg  1 mg Oral QHS  ondansetron (ZOFRAN ODT) tablet 8 mg  8 mg Oral Q8H PRN  pravastatin (PRAVACHOL) tablet 10 mg  10 mg Oral QHS  vit C-E-zinc cit-lutein-zeaxan 50 mg-15 unit- 4.5 mg-2.5 mg chew (OCU-ABDOUL) 1 Tab (Patient Supplied)  1 Tab Oral DAILY  ergocalciferol capsule 50,000 Units  50,000 Units Oral every Wednesday ASSESSMENT: 
 
Problem List  Date Reviewed: 4/30/2019 Codes Class Noted  Acute myeloid leukemia not having achieved remission ([address] ICD-10-CM: C92.00 
 ICD-9-CM: 205.00  5/9/2019 Admission for antineoplastic chemotherapy ICD-10-CM: Z51.11 ICD-9-CM: V58.11  5/5/2019 AML (acute myeloblastic leukemia) ([facility] ICD-10-CM: C92.00 ICD-9-CM: 205.00  4/28/2019 Weakness generalized ICD-10-CM: R53.1 ICD-9-CM: 780.79  4/28/2019 Pancytopenia ([facility] ICD-10-CM: V98.255 ICD-9-CM: 284.19  4/28/2019 Thrombocytopenia ([facility] ICD-10-CM: D69.6 ICD-9-CM: 287.5  4/27/2019 PLAN: 
AML FLT 3+ 
5/6  BMbx planned for Tuesday then wait for [facility] from port results to determine start date of  cycle one 7 + 3 with Midostaurin day 8-21. Platelets will need to be at least 50k for bx tomorrow 5/7. BMbx today-platelets prior. Also needed prbc today for hgb 6.9. 
5/08 Start 7+3 when afebrile per Dr. Mikki Greenberg. 5/09 Day 1 of 7+3. Monitor TLS labs. 5/10 Day 2 7+3. Tolerating well. 5/13 Day 5 7+3. Midostaurin on the way. 5/16 Day 8 7+3. Day one Rydapt. 5/19 Day 11 7+3, day 4 Rydapt. Pancytopenia related to chemotherapy 5/16 transfuse per Alexander SOPs 
  
Possible port infection 5/5 Day one vanc/cefe. Follow cultures. Do not use port. May need to have Echo if cultures positive. 5/6 BCNTD. Appears improved today. Continue to monitor. No fevers or other symptoms. 5/7 Day 3 vanc/cefe. Site appears even better today. 5/8 Does not appear infected. Site bruised. 5/9 BCx negative. Per ID okay to start treatment. 5/13 Completed 7 days Vanc/cef. Now on levaquin per ID. Neutropenic Fever 05/05 Pan-culture negative. On Vancomycin, Maxipime 05/08 Febrile overnight up to 102.5. Repeat cultures x 1. Continue antibiotics. Likely disease related. Consult ID for clearance. 05/09 BC remain negative. No fevers 48 hours. Continue vanc/cefe. 05/13 now only on LVQ per ID. 5/18 Neutropenic fever - 100.5. Pan-cultures obtained. Chest xray negative. Started on Vanc/Cef. 5/19 Afebrile.  Port culture with gram positive cocci in chains, await further studies. Continue current antibiotics. Bradycardia 5/14 EKG with no significant change. Alexander SOPs Supportive care ACV/Diflucan 
LVQ dc'd while on cefe and vanc Goals and plan of care reviewed with the patient. All questions answered to the best of our ability. ARMANDO Doan Yousuf [address] [place] [address] Office : [phone] Fax : [phone] Attending Addendum: 
Patient seen with NP.  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  QMY tx course complicated by febrile neutropenia treated with vanc/cef and pt remains on LVQ.  Overnight, febrile neutropenia. Cultures drawn/antibiotics changed from LVQ to vanc/cef. Cultures GPC, s/s pending. She is feeling better this am, nausea this am.   at bedside. I personally performed a face to face diagnostic evaluation on this patient.  My findings are as follows: A&ox3, lungs clear, heart regular, abdomen benign and ankle edema improved. Looks better this am.  Some energy back. Midostaurin started D8 as planned through D21.  TLS labs monitored.  Sinus irish on EKG - asymptomatic.  No qtc prolongation.  C/w supportive care and transfuse per protocol - PRBC today and plts.  Reflux resolved on PPI and carafate.      
  
I have reviewed and agree with the care plan.     
  
  
 
  
Mayela Clark MD 
[facility] [address] [place] [address] Office : [phone] Fax : [phone]

## 2019-05-20 LAB
ABO + RH BLD: NORMAL
ALBUMIN SERPL-MCNC: 2.4 G/DL (ref 3.2–4.6)
ALBUMIN/GLOB SERPL: 0.9 {RATIO} (ref 1.2–3.5)
ALP SERPL-CCNC: 46 U/L (ref 50–136)
ALT SERPL-CCNC: 10 U/L (ref 12–65)
ANION GAP SERPL CALC-SCNC: 8 MMOL/L (ref 7–16)
AST SERPL-CCNC: 4 U/L (ref 15–37)
BACTERIA SPEC CULT: NORMAL
BASOPHILS # BLD: 0 K/UL (ref 0–0.2)
BASOPHILS NFR BLD: 0 % (ref 0–2)
BILIRUB SERPL-MCNC: 0.3 MG/DL (ref 0.2–1.1)
BLD PROD TYP BPU: NORMAL
BLOOD GROUP ANTIBODIES SERPL: NORMAL
BPU ID: NORMAL
BUN SERPL-MCNC: 11 MG/DL (ref 8–23)
CALCIUM SERPL-MCNC: 8.1 MG/DL (ref 8.3–10.4)
CHLORIDE SERPL-SCNC: 107 MMOL/L (ref 98–107)
CO2 SERPL-SCNC: 24 MMOL/L (ref 21–32)
CREAT SERPL-MCNC: 0.65 MG/DL (ref 0.6–1)
CROSSMATCH RESULT,%XM: NORMAL
CROSSMATCH RESULT,%XM: NORMAL
DIFFERENTIAL METHOD BLD: ABNORMAL
EOSINOPHIL # BLD: 0 K/UL (ref 0–0.8)
EOSINOPHIL NFR BLD: 0 % (ref 0.5–7.8)
ERYTHROCYTE [DISTWIDTH] IN BLOOD BY AUTOMATED COUNT: 14.6 % (ref 11.9–14.6)
GLOBULIN SER CALC-MCNC: 2.8 G/DL (ref 2.3–3.5)
GLUCOSE SERPL-MCNC: 88 MG/DL (ref 65–100)
HCT VFR BLD AUTO: 22.4 % (ref 35.8–46.3)
HGB BLD-MCNC: 7.5 G/DL (ref 11.7–15.4)
IMM GRANULOCYTES # BLD AUTO: 0 K/UL (ref 0–0.5)
IMM GRANULOCYTES NFR BLD AUTO: 0 % (ref 0–5)
LDH SERPL L TO P-CCNC: 122 U/L (ref 110–210)
LYMPHOCYTES # BLD: 0.1 K/UL (ref 0.5–4.6)
LYMPHOCYTES NFR BLD: 69 % (ref 13–44)
MAGNESIUM SERPL-MCNC: 2.3 MG/DL (ref 1.8–2.4)
MCH RBC QN AUTO: 31.5 PG (ref 26.1–32.9)
MCHC RBC AUTO-ENTMCNC: 33.5 G/DL (ref 31.4–35)
MCV RBC AUTO: 94.1 FL (ref 79.6–97.8)
MONOCYTES # BLD: 0 K/UL (ref 0.1–1.3)
MONOCYTES NFR BLD: 6 % (ref 4–12)
NEUTS SEG # BLD: 0 K/UL (ref 1.7–8.2)
NEUTS SEG NFR BLD: 25 % (ref 43–78)
NRBC # BLD: 0 K/UL (ref 0–0.2)
PHOSPHATE SERPL-MCNC: 2.7 MG/DL (ref 2.3–3.7)
PLATELET # BLD AUTO: 38 K/UL (ref 150–450)
PMV BLD AUTO: 10.6 FL (ref 9.4–12.3)
POTASSIUM SERPL-SCNC: 3.6 MMOL/L (ref 3.5–5.1)
PROT SERPL-MCNC: 5.2 G/DL (ref 6.3–8.2)
RBC # BLD AUTO: 2.38 M/UL (ref 4.05–5.2)
SERVICE CMNT-IMP: NORMAL
SODIUM SERPL-SCNC: 139 MMOL/L (ref 136–145)
SPECIMEN EXP DATE BLD: NORMAL
STATUS OF UNIT,%ST: NORMAL
UNIT DIVISION, %UDIV: 0
URATE SERPL-MCNC: 1.7 MG/DL (ref 2.6–6)
WBC # BLD AUTO: 0.2 K/UL (ref 4.3–11.1)

## 2019-05-20 PROCEDURE — 80053 COMPREHEN METABOLIC PANEL: CPT

## 2019-05-20 PROCEDURE — 65270000029 HC RM PRIVATE

## 2019-05-20 PROCEDURE — 36591 DRAW BLOOD OFF VENOUS DEVICE: CPT

## 2019-05-20 PROCEDURE — 84550 ASSAY OF BLOOD/URIC ACID: CPT

## 2019-05-20 PROCEDURE — 74011250637 HC RX REV CODE- 250/637: Performed by: NURSE PRACTITIONER

## 2019-05-20 PROCEDURE — 85025 COMPLETE CBC W/AUTO DIFF WBC: CPT

## 2019-05-20 PROCEDURE — 84100 ASSAY OF PHOSPHORUS: CPT

## 2019-05-20 PROCEDURE — 83735 ASSAY OF MAGNESIUM: CPT

## 2019-05-20 PROCEDURE — 99232 SBSQ HOSP IP/OBS MODERATE 35: CPT | Performed by: INTERNAL MEDICINE

## 2019-05-20 PROCEDURE — 83615 LACTATE (LD) (LDH) ENZYME: CPT

## 2019-05-20 PROCEDURE — 74011000258 HC RX REV CODE- 258: Performed by: INTERNAL MEDICINE

## 2019-05-20 PROCEDURE — 74011250637 HC RX REV CODE- 250/637: Performed by: INTERNAL MEDICINE

## 2019-05-20 PROCEDURE — 74011000250 HC RX REV CODE- 250: Performed by: NURSE PRACTITIONER

## 2019-05-20 PROCEDURE — 74011250636 HC RX REV CODE- 250/636: Performed by: INTERNAL MEDICINE

## 2019-05-20 RX ADMIN — CHLORHEXIDINE GLUCONATE 15 ML: 1.2 RINSE ORAL at 08:46

## 2019-05-20 RX ADMIN — Medication 5 ML: at 21:41

## 2019-05-20 RX ADMIN — ESCITALOPRAM OXALATE 10 MG: 10 TABLET ORAL at 08:51

## 2019-05-20 RX ADMIN — SUCRALFATE 1 G: 1 TABLET ORAL at 11:59

## 2019-05-20 RX ADMIN — VANCOMYCIN HYDROCHLORIDE 1250 MG: 10 INJECTION, POWDER, LYOPHILIZED, FOR SOLUTION INTRAVENOUS at 22:50

## 2019-05-20 RX ADMIN — CHLORHEXIDINE GLUCONATE 15 ML: 1.2 RINSE ORAL at 17:17

## 2019-05-20 RX ADMIN — Medication 500 MG: at 08:46

## 2019-05-20 RX ADMIN — Medication 500 MG: at 11:59

## 2019-05-20 RX ADMIN — ALLOPURINOL 300 MG: 300 TABLET ORAL at 08:46

## 2019-05-20 RX ADMIN — Medication 5 ML: at 11:30

## 2019-05-20 RX ADMIN — POTASSIUM CHLORIDE 20 MEQ: 20 TABLET, EXTENDED RELEASE ORAL at 08:46

## 2019-05-20 RX ADMIN — FLUCONAZOLE 200 MG: 100 TABLET ORAL at 08:46

## 2019-05-20 RX ADMIN — PRAVASTATIN SODIUM 10 MG: 20 TABLET ORAL at 21:30

## 2019-05-20 RX ADMIN — PANTOPRAZOLE SODIUM 40 MG: 40 TABLET, DELAYED RELEASE ORAL at 08:46

## 2019-05-20 RX ADMIN — SUCRALFATE 1 G: 1 TABLET ORAL at 16:16

## 2019-05-20 RX ADMIN — Medication 500 MG: at 16:16

## 2019-05-20 RX ADMIN — VANCOMYCIN HYDROCHLORIDE 1250 MG: 10 INJECTION, POWDER, LYOPHILIZED, FOR SOLUTION INTRAVENOUS at 10:46

## 2019-05-20 RX ADMIN — ACETAMINOPHEN: 500 TABLET, FILM COATED ORAL at 21:32

## 2019-05-20 RX ADMIN — ACYCLOVIR 400 MG: 800 TABLET ORAL at 08:46

## 2019-05-20 RX ADMIN — ACYCLOVIR 400 MG: 800 TABLET ORAL at 17:17

## 2019-05-20 RX ADMIN — LORAZEPAM 1 MG: 1 TABLET ORAL at 21:29

## 2019-05-20 RX ADMIN — CEFEPIME HYDROCHLORIDE 2 G: 2 INJECTION, POWDER, FOR SOLUTION INTRAVENOUS at 00:31

## 2019-05-20 RX ADMIN — CEFEPIME HYDROCHLORIDE 2 G: 2 INJECTION, POWDER, FOR SOLUTION INTRAVENOUS at 08:46

## 2019-05-20 RX ADMIN — Medication 5 ML: at 08:48

## 2019-05-20 RX ADMIN — SUCRALFATE 1 G: 1 TABLET ORAL at 08:46

## 2019-05-20 RX ADMIN — CEFEPIME HYDROCHLORIDE 2 G: 2 INJECTION, POWDER, FOR SOLUTION INTRAVENOUS at 16:16

## 2019-05-20 RX ADMIN — SUCRALFATE 1 G: 1 TABLET ORAL at 21:32

## 2019-05-20 RX ADMIN — Medication 5 ML: at 16:16

## 2019-05-20 NOTE — PROGRESS NOTES
Problem: Falls - Risk of 
Goal: *Absence of Falls Description Document Mariel Ruts Fall Risk and appropriate interventions in the flowsheet. Outcome: Progressing Towards Goal 
  
Problem: Discharge Planning Goal: *Discharge to safe environment Outcome: Progressing Towards Goal 
Goal: *Knowledge of medication management Outcome: Progressing Towards Goal 
Goal: *Knowledge of discharge instructions Outcome: Progressing Towards Goal 
  
Problem: Anemia Care Plan (Adult and Pediatric) Goal: *Labs within defined limits Outcome: Progressing Towards Goal 
Goal: *Tolerates increased activity Outcome: Progressing Towards Goal 
  
Problem: Pain Goal: *Control of Pain Outcome: Progressing Towards Goal 
  
Problem: Nutrition Deficit Goal: *Optimize nutritional status Outcome: Progressing Towards Goal 
  
Problem: Chemotherapy, Day 3 to Discharge Goal: Off Pathway (Use only if patient is Off Pathway) Outcome: Progressing Towards Goal 
Goal: Activity/Safety Outcome: Progressing Towards Goal 
Goal: Consults, if ordered Outcome: Progressing Towards Goal 
Goal: Diagnostic Test/Procedures Outcome: Progressing Towards Goal 
Goal: Nutrition/Diet Outcome: Progressing Towards Goal 
Goal: Discharge Planning Outcome: Progressing Towards Goal 
Goal: Medications Outcome: Progressing Towards Goal 
Goal: Respiratory Outcome: Progressing Towards Goal 
Goal: Treatments/Interventions/Procedures Outcome: Progressing Towards Goal 
Goal: Psychosocial 
Outcome: Progressing Towards Goal 
Goal: *Optimal pain control at patient's stated goal 
Outcome: Progressing Towards Goal 
Goal: *Hemodynamically stable Outcome: Progressing Towards Goal 
Goal: *Adequate oxygenation Outcome: Progressing Towards Goal

## 2019-05-20 NOTE — PROGRESS NOTES
[facility] Hematology & Oncology Inpatient Hematology / Oncology Progress Note Admission Date: 2019 10:53 AM 
Reason for Admission/Hospital Course: Admission for antineoplastic chemotherapy [Z51.11] 24 Hour Events: VSS/afebrile Day 12 post 7+3 BC from port +GPC alpha strep-ID consulted ROS: 
Constitutional:  negative for fever, chills. +weakness, malaise, fatigue. CV:  negative for chest pain, palpitations, edema. Respiratory: negative for dyspnea, cough, wheezing. GI: negative abdominal pain, diarrhea. + nausea/vomiting. 10 point review of systems is otherwise negative with the exception of the elements mentioned above in the HPI. No Known Allergies OBJECTIVE: 
Patient Vitals for the past 8 hrs: 
 BP Temp Pulse Resp SpO2  
19 0716 119/55 98.1 °F (36.7 °C) (!) 56 16 96 % Temp (24hrs), Av.6 °F (37 °C), Min:98.1 °F (36.7 °C), Max:98.9 °F (37.2 °C) 
 
 0701 -  1900 In: [phone] [P.O.:437] Out: 500 [Urine:500] Physical Exam: 
Constitutional: Well developed, well nourished female in no acute distress, sitting comfortably in bed HEENT: Normocephalic and atraumatic. Oropharynx is clear, mucous membranes are moist. Extraocular muscles are intact. Sclerae anicteric. Skin Warm and dry. No rash noted. No erythema. No pallor. Respiratory Lungs are clear to auscultation bilaterally without wheezes, rales or rhonchi, normal air exchange without accessory muscle use. CVS Normal rate, regular rhythm and normal S1 and S2. No murmurs, gallops, or rubs. Abdomen Soft, nontender and nondistended, normoactive bowel sounds. Neuro Grossly nonfocal with no obvious sensory or motor deficits. MSK Normal range of motion in general. BLE edema improved Psych Appropriate mood and affect. Labs: 
   
Recent Labs  
  19 
0241 19 
0419 19 
8003 WBC 0.2* 0.1* 0.1*  
RBC 2.38* 2.19* 2.55* HGB 7.5* 6.8* 7.9*  
 HCT 22.4* 20.1* 23.2*  
MCV 94.1 91.8 91.0 MCH 31.5 31.1 31.0 MCHC 33.5 33.8 34.1  
RDW 14.6 14.6 14.3 PLT 38* 4* 11* GRANS 25*  --   --   
LYMPH 69*  --   -- MONOS 6  --   --   
EOS 0*  --   --   
BASOS 0  --   --   
IG 0  --   --   
DF AUTOMATED MANUAL MANUAL ANEU 0.0*  --   --   
ABL 0.1*  --   --   
ABM 0.0*  --   --   
ALEKSANDR 0.0  --   --   
ABB 0.0  --   --   
AIG 0.0  --   --   
 
  
Recent Labs  
  05/20/19 
0241 05/19/19 
0419 05/18/19 
0306  135* 132* K 3.6 4.1 4.1  104 97* CO2 24 25 27 AGAP 8 6* 8  
GLU 88 97 107* BUN 11 15 23 CREA 0.65 0.70 0.75 GFRAA >60 >60 >60 GFRNA >60 >60 >60  
CA 8.1* 8.1* 9.1 SGOT 4* 4* 6* AP 46* 44* 51  
TP 5.2* 5.2* 5.9* ALB 2.4* 2.4* 3.2 GLOB 2.8 2.8 2.7 AGRAT 0.9* 0.9* 1.2 MG 2.3 2.3 2.1 PHOS 2.7 4.0* 3.9* Imaging: XR CHEST SNGL V [[id number]] Collected: 05/18/19 0855 Order Status: Completed Updated: 05/18/19 4417 Narrative:    
EXAM: Single view chest radiograph. INDICATION: 73 years Fever COMPARISON: None. FINDINGS: 
No focal lung opacity. No pneumothorax. No pleural effusion. The heart, 
mediastinum, jhoana, and pulmonary vasculature are within normal limits. No 
evidence of acute osseous abnormality. Right-sided chest port with tip 
terminating in the right atrium. Impression:    
IMPRESSION:  
1. No evidence of pneumonia. Medications: 
Current Facility-Administered Medications Medication Dose Route Frequency  0.9% sodium chloride infusion 250 mL  250 mL IntraVENous PRN  
 cefepime (MAXIPIME) 2 g in 0.9% sodium chloride (MBP/ADV) 100 mL  2 g IntraVENous Q8H  
 vancomycin (VANCOCIN) 1250 mg in  ml infusion  1,250 mg IntraVENous Q12H  potassium chloride (K-DUR, KLOR-CON) SR tablet 20 mEq  20 mEq Oral DAILY  chlorhexidine (PERIDEX) 0.12 % mouthwash 15 mL  15 mL Oral BID  midostaurin (RYDAPT) chemo capsule 50 mg (Patient Supplied)  50 mg Oral Q12H  sucralfate (CARAFATE) tablet 1 g  1 g Oral AC&HS  pantoprazole (PROTONIX) tablet 40 mg  40 mg Oral ACB  alum-mag hydroxide-simeth (MYLANTA) oral suspension 30 mL  30 mL Oral Q4H PRN  
 magic mouthwash (RENÉ) oral suspension 5 mL  5 mL Oral AC&HS  morphine injection 2 mg  2 mg IntraVENous Q4H PRN  
 HYDROcodone-acetaminophen (NORCO) 5-325 mg per tablet 1 Tab  1 Tab Oral Q6H PRN  psyllium husk-aspartame (METAMUCIL FIBER) packet 1 Packet  1 Packet Oral BID PRN  
 central line flush (saline) syringe 10 mL  10 mL InterCATHeter PRN  
 heparin (porcine) pf 300 Units  300 Units InterCATHeter PRN  
 0.9% sodium chloride infusion 250 mL  250 mL IntraVENous PRN  polyethylene glycol (MIRALAX) packet 17 g  17 g Oral DAILY PRN  
 LORazepam (ATIVAN) injection 0.5 mg  0.5 mg IntraVENous Q6H PRN  
 acetaminophen (TYLENOL) tablet 650 mg  650 mg Oral PRN  
 diphenhydrAMINE (BENADRYL) capsule 25 mg  25 mg Oral PRN  
 acetaminophen/diphenhydrAMINE (TYLENOL PM EXT STR) 500/25 mg   Oral QHS  acyclovir (ZOVIRAX) tablet 400 mg  400 mg Oral BID  allopurinol (ZYLOPRIM) tablet 300 mg  300 mg Oral DAILY  calcium carbonate (OS-CHRIS) tablet 500 mg [elemental]  500 mg Oral TID WITH MEALS  escitalopram oxalate (LEXAPRO) tablet 10 mg  10 mg Oral DAILY  fluconazole (DIFLUCAN) tablet 200 mg  200 mg Oral DAILY  lidocaine-prilocaine (EMLA) 2.5-2.5 % cream   Topical PRN  
 LORazepam (ATIVAN) tablet 1 mg  1 mg Oral QHS  ondansetron (ZOFRAN ODT) tablet 8 mg  8 mg Oral Q8H PRN  pravastatin (PRAVACHOL) tablet 10 mg  10 mg Oral QHS  vit C-E-zinc cit-lutein-zeaxan 50 mg-15 unit- 4.5 mg-2.5 mg chew (OCU-ABDOUL) 1 Tab (Patient Supplied)  1 Tab Oral DAILY  ergocalciferol capsule 50,000 Units  50,000 Units Oral every Wednesday ASSESSMENT: 
 
Problem List  Date Reviewed: 4/30/2019 Codes Class Noted  Acute myeloid leukemia not having achieved remission ([address] ICD-10-CM: C92.00 
 ICD-9-CM: 205.00  5/9/2019 Admission for antineoplastic chemotherapy ICD-10-CM: Z51.11 ICD-9-CM: V58.11  5/5/2019 AML (acute myeloblastic leukemia) ([facility] ICD-10-CM: C92.00 ICD-9-CM: 205.00  4/28/2019 Weakness generalized ICD-10-CM: R53.1 ICD-9-CM: 780.79  4/28/2019 Pancytopenia ([facility] ICD-10-CM: Y15.576 ICD-9-CM: 284.19  4/28/2019 Thrombocytopenia ([facility] ICD-10-CM: D69.6 ICD-9-CM: 287.5  4/27/2019 PLAN: 
AML FLT 3+ 
5/6  BMbx planned for Tuesday then wait for [facility] from port results to determine start date of  cycle one 7 + 3 with Midostaurin day 8-21. Platelets will need to be at least 50k for bx tomorrow 5/7. BMbx today-platelets prior. Also needed prbc today for hgb 6.9. 
5/08 Start 7+3 when afebrile per Dr. Bianka Archer. 5/09 Day 1 of 7+3. Monitor TLS labs. 5/10 Day 2 7+3. Tolerating well. 5/13 Day 5 7+3. Midostaurin on the way. 5/16 Day 8 7+3. Day one Rydapt. 5/19 Day 11 7+3, day 4 Rydapt. Pancytopenia related to chemotherapy 5/16 transfuse per Alexander SOPs 
  
Possible port infection 5/5 Day one vanc/cefe. Follow cultures. Do not use port. May need to have Echo if cultures positive. 5/6 BCNTD. Appears improved today. Continue to monitor. No fevers or other symptoms. 5/7 Day 3 vanc/cefe. Site appears even better today. 5/8 Does not appear infected. Site bruised. 5/9 BCx negative. Per ID okay to start treatment. 5/13 Completed 7 days Vanc/cef. Now on levaquin per ID. 5/20 BC from port +GPC alpha strep-ID consulted Neutropenic Fever 05/05 Pan-culture negative. On Vancomycin, Maxipime 05/08 Febrile overnight up to 102.5. Repeat cultures x 1. Continue antibiotics. Likely disease related. Consult ID for clearance. 05/09 BC remain negative. No fevers 48 hours. Continue vanc/cefe. 05/13 now only on LVQ per ID. 5/18 Neutropenic fever - 100.5. Pan-cultures obtained. Chest xray negative. Started on Vanc/Cef. 5/19 Afebrile. Port culture with gram positive cocci in chains, await further studies. Continue current antibiotics. 5/20 port +BC with GPC alpha strep-ID consulted Bradycardia 5/14 EKG with no significant change. Alexander SOPs Supportive care ACV/Diflucan 
LVQ dc'd while on cefe and vanc Goals and plan of care reviewed with the patient. All questions answered to the best of our ability. Sharad Gonzalez NP 
[address] Office : [phone] Fax : [phone]

## 2019-05-20 NOTE — PROGRESS NOTES
Nutrition Follow Up Assessment Diet: DIET REGULAR 
DIET NUTRITIONAL SUPPLEMENTS All Meals; Ensure High Protein Food,Nutrition, and Pertinent History: Patient reports that on Saturday her appetite was very poor then it started to pick back up on Sunday and this morning she was able to eat all of her breakfast plus drink her ensure. She denies any po difficulties and has no questions or concerns at this time. She believes that her appetite is back and that she has no barriers to her meal intakes at this time. Anthropometrics: Height: 5' 6\" (167.6 cm), Weight Source: Standing scale (comment), Weight: 88.3 kg (194 lb 11.2 oz). Edema: 1+ pitting to BLEs Macronutrient Needs: 
· EER:  4857-5230 kcal /day (15-20 kcal/kg listed BW) · EPR:   grams protein/day (1-1.2 grams/kg listed BW) Intake/Comparative Standards:Average intake for past 7 day(s)/14 recorded meal(s): 85%. This potentially meets ~100% of kcal and ~90% of protein needs. Intervention:  
Meals and snacks: Continue current diet. Oral Nutrition Supplements: Continue Ensure High Protein Discharge nutrition plan: Too soon to determine. Aster Emery [address], [address] [place] [address], [place]  
[phone]

## 2019-05-20 NOTE — PROGRESS NOTES
Problem: Falls - Risk of 
Goal: *Absence of Falls Description Document [facility] Risk and appropriate interventions in the flowsheet. 5/20/2019 0418 by Herminia Crump Outcome: Progressing Towards Goal 
5/20/2019 0416 by Herminia Crump Outcome: Progressing Towards Goal 
  
Problem: Pain Goal: *Control of Pain 5/20/2019 0418 by Herminia Crump Outcome: Progressing Towards Goal 
5/20/2019 0416 by Herminia Crump Outcome: Progressing Towards Goal

## 2019-05-20 NOTE — PROGRESS NOTES
Problem: Falls - Risk of 
Goal: *Absence of Falls Description Document Jadenboydtia Mario Fall Risk and appropriate interventions in the flowsheet. Outcome: Progressing Towards Goal 
  
Problem: Pain Goal: *Control of Pain Outcome: Progressing Towards Goal

## 2019-05-20 NOTE — PROGRESS NOTES
0700-Bedside report received from Love Houston RN. Resting in bed. No needs voiced. No s/s of acute distress. 1805-END OF SHIFT NOTE: 
Pt's VSS and is in no acute distress. Pt having minimal nausea today and is doing oral care frequently. Intake/Output 05/20 0701 - 05/20 1900 In: 8510 [P.O.:909; I.V.:389] Out: 500 [Urine:500] Voiding: YES Catheter: NO 
Drain:   
 
Stool:  0 occurrences. Stool Assessment Stool Color: Traci Shine (05/18/19 0911) Stool Appearance: Soft (05/19/19 0830) Stool Amount: Small (05/18/19 0911) Stool Source/Status: Rectum (05/18/19 0911) Emesis:  0 occurrences. Emesis Assessment Appearance: Undigested food (05/18/19 0258) Emesis Amount: Small (05/18/19 0258) VITAL SIGNS Patient Vitals for the past 12 hrs: 
 Temp Pulse Resp BP SpO2  
05/20/19 1520 98.7 °F (37.1 °C) (!) 56 18 140/63 96 % 05/20/19 1104 98.3 °F (36.8 °C) (!) 53 18 118/56 94 % 05/20/19 0716 98.1 °F (36.7 °C) (!) 56 16 119/55 96 % Pain Assessment Pain 1 Pain Scale 1: Numeric (0 - 10) (05/20/19 1423) Pain Intensity 1: 0 (05/20/19 1423) Patient Stated Pain Goal: 0 (05/20/19 0112) Pain Reassessment 1: Yes (05/19/19 1420) Pain Onset 1: now (05/17/19 1600) Pain Location 1: Chest (05/17/19 1600) Pain Orientation 1: Mid (05/17/19 1600) Pain Description 1: Sore (05/17/19 1600) Pain Intervention(s) 1: Medication (see MAR) (05/17/19 1600) Ambulating Yes Susan Rai 1850-Bedside shift change report given to Norma Leiva RN (oncoming nurse) by Cecily Interiano RN (offgoing nurse). Report included the following information SBAR, Kardex, Intake/Output, MAR and Recent Results.

## 2019-05-20 NOTE — PROGRESS NOTES
END OF SHIFT NOTE: 
 
Intake/Output No intake/output data recorded. Voiding: YES Catheter: NO 
Drain:   
 
 
 
 
 
Stool:  0 occurrences. Stool Assessment Stool Color: [place] Lade (05/18/19 0911) Stool Appearance: Soft (05/19/19 0830) Stool Amount: Small (05/18/19 0911) Stool Source/Status: Rectum (05/18/19 0911) Emesis:  0 occurrences. Emesis Assessment Appearance: Undigested food (05/18/19 0258) Emesis Amount: Small (05/18/19 0258) VITAL SIGNS Patient Vitals for the past 12 hrs: 
 Temp Pulse Resp BP SpO2  
05/20/19 0716 98.1 °F (36.7 °C) (!) 56 16 119/55 96 % 05/19/19 2332 98.9 °F (37.2 °C) (!) 56 16 119/57 96 % Pain Assessment Pain 1 Pain Scale 1: Numeric (0 - 10) (05/20/19 0112) Pain Intensity 1: 0 (05/20/19 0112) Patient Stated Pain Goal: 0 (05/20/19 0112) Pain Reassessment 1: Yes (05/19/19 1420) Pain Onset 1: now (05/17/19 1600) Pain Location 1: Chest (05/17/19 1600) Pain Orientation 1: Mid (05/17/19 1600) Pain Description 1: Sore (05/17/19 1600) Pain Intervention(s) 1: Medication (see MAR) (05/17/19 1600) Ambulating Yes Additional Information: Pt rested well this shift. No needs stated at this time. Shift report given to oncoming nurse at the bedside. Orma Amend

## 2019-05-20 NOTE — PROGRESS NOTES
Pharmacokinetic Consult to Pharmacist 
 
Ed Zay is a 68 y.o. female being treated for febrile neutropenia with vancomycin and cefepime. Height: 5' 6\" (167.6 cm)  Weight: 88.3 kg (194 lb 11.2 oz) Lab Results Component Value Date/Time BUN 11 05/20/2019 02:41 AM  
 Creatinine 0.65 05/20/2019 02:41 AM  
 WBC 0.2 (LL) 05/20/2019 02:41 AM  
  
Estimated Creatinine Clearance: 86.3 mL/min (based on SCr of 0.65 mg/dL). Lab Results Component Value Date/Time Vancomycin,trough 14.4 05/19/2019 09:29 PM  
 
 
Day 3 of vancomycin. Goal trough is 15-20. No changes, continue 1.25g q12h. Will continue to follow patient. Thank you, Eugene Gentile, Pharm. D. Clinical Pharmacist 
[phone]

## 2019-05-20 NOTE — CONSULTS
Infectious Disease Consult Today's Date: 5/20/2019 Admit Date: 5/5/2019 Impression: · Newly diagnosed AML, started induction chemotherapy on 5/9/19 · Fever · S/p PORT placement (4/30), blood cultures growing Alpha strep 1/2 (Port)(5/18) · Pancytopenia Plan:  
· Continue Cefepime and Vancomycin for now · Await final Alpha strep ID and sensitivities · Continue Diflucan/ACV prophylaxis · Repeat blood cultures in AM 
· May require port removal if [facility] [place] is finalized · Repeat TTE; R/O vegetation · ID will continue to follow Anti-infectives: · LVQ (4/30-5/5) (5/12 - · Vanc/Cefepime (5/5-5/11) Restarted 5/18- Subjective:  
Date of Consultation:  May 20, 2019 Referring Physician: MADELINE Dsouza NP Patient is a 68 y.o. female  admitted on 5/5/2019 for Chemotherapy for AML dx at [facility] on 4/24/19. On initial exam her right side port was red and swollen so patient started on vanc/cefepime. Blood cultures initially were no growth. AML treatment was started. New fevers noted 5/18 and repeat blood cultures, from PORT are growing Alpha strep. CXR (-). Vancomycin and Cefepime restarted 5/18/19. ID is asked to evaluate patient for recommendations regarding Alpha strep port infection. Patient Active Problem List  
Diagnosis Code  Thrombocytopenia ([address] D69.6  AML (acute myeloblastic leukemia) (HCC) C92.00  Weakness generalized R53.1  Pancytopenia ([address] M33.890  Admission for antineoplastic chemotherapy Z51.11  
 Acute myeloid leukemia not having achieved remission ([address] C92.00 Past Medical History:  
Diagnosis Date  Cancer ([address] AML No family history on file. Social History Tobacco Use  Smoking status: Never Smoker  Smokeless tobacco: Never Used Substance Use Topics  Alcohol use: Never Frequency: Never Past Surgical History:  
Procedure Laterality Date  IR INSERT TUNL CVC W PORT OVER 5 YEARS  4/30/2019 Prior to Admission medications Medication Sig Start Date End Date Taking? Authorizing Provider  
lidocaine-prilocaine (EMLA) topical cream Apply  to affected area as needed for Pain. Apply to port site 45-60 minutes prior to lab appt or infusion 5/2/19  Yes Yelitza Gonzalez MD  
allopurinol (ZYLOPRIM) 300 mg tablet Take 1 Tab by mouth daily for 30 days. 4/30/19 5/30/19 Yes Gricelda Martinez NP  
acyclovir (ZOVIRAX) 400 mg tablet Take 1 Tab by mouth two (2) times a day for 30 days. 4/30/19 5/30/19 Yes Gricelda Martinez NP  
calcium carbonate (OS-CHRIS) 500 mg calcium (1,250 mg) tablet Take 1 Tab by mouth three (3) times daily (with meals). 4/30/19  Yes Gricelda Martinez NP  
fluconazole (DIFLUCAN) 200 mg tablet Take 1 Tab by mouth daily for 30 days. FDA advises cautious prescribing of oral fluconazole in pregnancy. 4/30/19 5/30/19 Yes Gricelda Martinez NP  
levoFLOXacin (LEVAQUIN) 500 mg tablet Take 1 Tab by mouth every twenty-four (24) hours for 30 days. 4/30/19 5/30/19 Yes Gricelda Martinez NP  
cholecalciferol (VITAMIN D3) 50,000 unit capsule Take 1 Cap by mouth every seven (7) days for 8 doses. 4/30/19 6/19/19 Yes Gricelda Martinez NP  
ondansetron hcl (ZOFRAN) 8 mg tablet Take 1 Tab by mouth every eight (8) hours as needed for Nausea. 4/30/19  Yes Gricelda Martinez NP  
vit C/E/zinc/lutein/zeaxanthin ([address] PO) Take 1 Tab by mouth daily. Yes Provider, Historical  
escitalopram oxalate (LEXAPRO) 10 mg tablet Take 10 mg by mouth daily. Yes Provider, Historical  
LORazepam (ATIVAN) 1 mg tablet Take  by mouth nightly. Yes Provider, Historical  
acetaminophen 500 mg tab 500 mg, diphenhydrAMINE 25 mg cap 25 mg Take  by mouth nightly. Yes Provider, Historical  
pravastatin (PRAVACHOL) 10 mg tablet Take  by mouth nightly. Yes Provider, Historical  
midostaurin (RYDAPT) 25 mg capsule Take 2 Caps by mouth every twelve (12) hours.  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  
 
 
No Known Allergies Review of Systems:  A comprehensive review of systems was negative except for that written in the History of Present Illness. Objective:  
 
Visit Vitals /56 (BP 1 Location: Right arm, BP Patient Position: At rest) Pulse (!) 53 Temp 98.3 °F (36.8 °C) Resp 18 Ht 5' 6\" (1.676 m) Wt 88.3 kg (194 lb 11.2 oz) SpO2 94% Breastfeeding? No  
BMI 31.43 kg/m² Temp (24hrs), Av.6 °F (37 °C), Min:98.1 °F (36.7 °C), Max:98.9 °F (37.2 °C) Lines:  R chest port w/o erythema or swelling Physical Exam:   
General:  Alert, cooperative, well noursished, well developed, appears stated age Eyes:  Sclera anicteric. Pupils equally round and reactive to light. Mouth/Throat: Mucous membranes normal, oral pharynx clear Neck: Supple Lungs:   Clear to auscultation bilaterally, good effort CV:  Regular rate and rhythm,no murmur, click, rub or gallop Abdomen:   Soft, non-tender. bowel sounds normal. non-distended Extremities: No cyanosis or edema Skin: Skin color, texture, turgor normal. no acute rash or lesions Lymph nodes: Cervical and supraclavicular normal  
Musculoskeletal: No swelling or deformity Lines/Devices:  Intact, no erythema, drainage or tenderness Psych: Alert and oriented, normal mood affect given the setting Data Review: CBC: 
Recent Labs  
  19 
0306 WBC 0.2* 0.1* 0.1* GRANS 25*  --   -- MONOS 6  --   --   
EOS 0*  --   --   
ANEU 0.0*  --   --   
ABL 0.1*  --   --   
HGB 7.5* 6.8* 7.9*  
HCT 22.4* 20.1* 23.2*  
PLT 38* 4* 11* BMP: 
Recent Labs  
  19 
1723 CREA 0.65 0.70 0.75 BUN 11 15 23  135* 132* K 3.6 4.1 4.1  104 97* CO2 24 25 27 AGAP 8 6* 8  
GLU 88 97 107* LFTS: 
Recent Labs  
  19 
0241 19 
0419 19 
0615 TBILI 0.3 0.4 0.8 ALT 10* 10* 17 SGOT 4* 4* 6* AP 46* 44* 51  
TP 5.2* 5.2* 5.9* ALB 2.4* 2.4* 3.2 Microbiology:  
 
All Micro Results Procedure Component Value Units Date/Time CULTURE, URINE [[id number]] Collected:  05/18/19 1125 Order Status:  Completed Specimen:  Urine from Clean catch Updated:  05/20/19 0840 Special Requests: NO SPECIAL REQUESTS Culture result: NO GROWTH 2 DAYS     
 CULTURE, BLOOD [[id number]] Collected:  05/18/19 1538 Order Status:  Completed Specimen:  Blood Updated:  05/20/19 4754 Special Requests: --     
  LEFT 
HAND Culture result: NO GROWTH 2 DAYS     
 CULTURE, BLOOD [[id number]]  (Abnormal) Collected:  05/18/19 0830 Order Status:  Completed Specimen:  Blood Updated:  05/20/19 2992 Special Requests: --     
  NO SPECIAL REQUESTS SINGLE PORT 
  
  GRAM STAIN GRAM POS COCCI IN CHAINS ANAEROBIC BOTTLE POSITIVE RESULTS VERIFIED, PHONED TO AND READ BACK BY Ruperto Banegas RN AT 3732 ON 5/19/19  [facility] Culture result: ALPHA STREPTOCOCCUS     
      
  CULTURE IN PROGRESS,FURTHER UPDATES TO FOLLOW CULTURE, BLOOD [[id number]] Collected:  05/07/19 2241 Order Status:  Completed Specimen:  Blood Updated:  05/12/19 0747 Special Requests: --     
  RIGHT Antecubital 
  
  Culture result: NO GROWTH 5 DAYS     
 CULTURE, BLOOD [[id number]] Collected:  05/05/19 1251 Order Status:  Completed Specimen:  Blood Updated:  05/10/19 4056 Special Requests: SINGLE PORT Culture result: NO GROWTH 5 DAYS     
 CULTURE, BLOOD [[id number]] Collected:  05/05/19 1309 Order Status:  Completed Specimen:  Blood Updated:  05/10/19 8080 Special Requests: --     
  RIGHT Antecubital 
  
  Culture result: NO GROWTH 5 DAYS     
 CULTURE, URINE [[id number]] Collected:  05/07/19 2321 Order Status:  Completed Specimen:  Urine from Clean catch Updated:  05/10/19 8431 Special Requests: NO SPECIAL REQUESTS Culture result: NO GROWTH 2 DAYS Imagin/18/19 CXR (-) Signed By: Jovany Salazar NP May 20, 2019

## 2019-05-21 LAB
ALBUMIN SERPL-MCNC: 2.4 G/DL (ref 3.2–4.6)
ALBUMIN/GLOB SERPL: 0.9 {RATIO} (ref 1.2–3.5)
ALP SERPL-CCNC: 45 U/L (ref 50–136)
ALT SERPL-CCNC: 14 U/L (ref 12–65)
ANION GAP SERPL CALC-SCNC: 8 MMOL/L (ref 7–16)
AST SERPL-CCNC: 4 U/L (ref 15–37)
BASOPHILS # BLD: 0 K/UL (ref 0–0.2)
BASOPHILS NFR BLD: 0 % (ref 0–2)
BILIRUB SERPL-MCNC: 0.3 MG/DL (ref 0.2–1.1)
BUN SERPL-MCNC: 13 MG/DL (ref 8–23)
CALCIUM SERPL-MCNC: 7.9 MG/DL (ref 8.3–10.4)
CHLORIDE SERPL-SCNC: 106 MMOL/L (ref 98–107)
CO2 SERPL-SCNC: 24 MMOL/L (ref 21–32)
CREAT SERPL-MCNC: 0.57 MG/DL (ref 0.6–1)
DIFFERENTIAL METHOD BLD: ABNORMAL
EOSINOPHIL # BLD: 0 K/UL (ref 0–0.8)
EOSINOPHIL NFR BLD: 0 % (ref 0.5–7.8)
ERYTHROCYTE [DISTWIDTH] IN BLOOD BY AUTOMATED COUNT: 14.6 % (ref 11.9–14.6)
GLOBULIN SER CALC-MCNC: 2.7 G/DL (ref 2.3–3.5)
GLUCOSE SERPL-MCNC: 96 MG/DL (ref 65–100)
HCT VFR BLD AUTO: 21.5 % (ref 35.8–46.3)
HGB BLD-MCNC: 7.2 G/DL (ref 11.7–15.4)
IMM GRANULOCYTES # BLD AUTO: 0 K/UL (ref 0–0.5)
IMM GRANULOCYTES NFR BLD AUTO: 0 % (ref 0–5)
LDH SERPL L TO P-CCNC: 122 U/L (ref 110–210)
LYMPHOCYTES # BLD: 0.2 K/UL (ref 0.5–4.6)
LYMPHOCYTES NFR BLD: 83 % (ref 13–44)
MAGNESIUM SERPL-MCNC: 2 MG/DL (ref 1.8–2.4)
MCH RBC QN AUTO: 31.4 PG (ref 26.1–32.9)
MCHC RBC AUTO-ENTMCNC: 33.5 G/DL (ref 31.4–35)
MCV RBC AUTO: 93.9 FL (ref 79.6–97.8)
MONOCYTES # BLD: 0 K/UL (ref 0.1–1.3)
MONOCYTES NFR BLD: 4 % (ref 4–12)
NEUTS SEG # BLD: 0 K/UL (ref 1.7–8.2)
NEUTS SEG NFR BLD: 13 % (ref 43–78)
NRBC # BLD: 0 K/UL (ref 0–0.2)
PHOSPHATE SERPL-MCNC: 1.9 MG/DL (ref 2.3–3.7)
PLATELET # BLD AUTO: 32 K/UL (ref 150–450)
PLATELET COMMENTS,PCOM: ABNORMAL
PMV BLD AUTO: 10.9 FL (ref 9.4–12.3)
POTASSIUM SERPL-SCNC: 3.3 MMOL/L (ref 3.5–5.1)
POTASSIUM SERPL-SCNC: 3.9 MMOL/L (ref 3.5–5.1)
PROT SERPL-MCNC: 5.1 G/DL (ref 6.3–8.2)
RBC # BLD AUTO: 2.29 M/UL (ref 4.05–5.2)
RBC MORPH BLD: ABNORMAL
SODIUM SERPL-SCNC: 138 MMOL/L (ref 136–145)
URATE SERPL-MCNC: 1.5 MG/DL (ref 2.6–6)
WBC # BLD AUTO: 0.2 K/UL (ref 4.3–11.1)
WBC MORPH BLD: ABNORMAL

## 2019-05-21 PROCEDURE — 84550 ASSAY OF BLOOD/URIC ACID: CPT

## 2019-05-21 PROCEDURE — 87040 BLOOD CULTURE FOR BACTERIA: CPT

## 2019-05-21 PROCEDURE — 84100 ASSAY OF PHOSPHORUS: CPT

## 2019-05-21 PROCEDURE — 36591 DRAW BLOOD OFF VENOUS DEVICE: CPT | Performed by: NURSE PRACTITIONER

## 2019-05-21 PROCEDURE — 74011250637 HC RX REV CODE- 250/637: Performed by: NURSE PRACTITIONER

## 2019-05-21 PROCEDURE — 74011250637 HC RX REV CODE- 250/637: Performed by: INTERNAL MEDICINE

## 2019-05-21 PROCEDURE — 83735 ASSAY OF MAGNESIUM: CPT

## 2019-05-21 PROCEDURE — 74011000258 HC RX REV CODE- 258: Performed by: INTERNAL MEDICINE

## 2019-05-21 PROCEDURE — 65270000029 HC RM PRIVATE

## 2019-05-21 PROCEDURE — 74011250636 HC RX REV CODE- 250/636: Performed by: INTERNAL MEDICINE

## 2019-05-21 PROCEDURE — 74011000250 HC RX REV CODE- 250: Performed by: NURSE PRACTITIONER

## 2019-05-21 PROCEDURE — 85025 COMPLETE CBC W/AUTO DIFF WBC: CPT

## 2019-05-21 PROCEDURE — 83615 LACTATE (LD) (LDH) ENZYME: CPT

## 2019-05-21 PROCEDURE — 80053 COMPREHEN METABOLIC PANEL: CPT

## 2019-05-21 PROCEDURE — 99232 SBSQ HOSP IP/OBS MODERATE 35: CPT | Performed by: INTERNAL MEDICINE

## 2019-05-21 PROCEDURE — 84132 ASSAY OF SERUM POTASSIUM: CPT

## 2019-05-21 PROCEDURE — 93306 TTE W/DOPPLER COMPLETE: CPT

## 2019-05-21 RX ORDER — POTASSIUM CHLORIDE 29.8 MG/ML
40 INJECTION INTRAVENOUS ONCE
Status: COMPLETED | OUTPATIENT
Start: 2019-05-21 | End: 2019-05-21

## 2019-05-21 RX ORDER — HEPARIN 100 UNIT/ML
300 SYRINGE INTRAVENOUS EVERY 8 HOURS
Status: DISCONTINUED | OUTPATIENT
Start: 2019-05-21 | End: 2019-06-07 | Stop reason: HOSPADM

## 2019-05-21 RX ADMIN — Medication 500 MG: at 16:10

## 2019-05-21 RX ADMIN — SODIUM CHLORIDE, PRESERVATIVE FREE 300 UNITS: 5 INJECTION INTRAVENOUS at 13:05

## 2019-05-21 RX ADMIN — SUCRALFATE 1 G: 1 TABLET ORAL at 16:10

## 2019-05-21 RX ADMIN — PRAVASTATIN SODIUM 10 MG: 20 TABLET ORAL at 21:56

## 2019-05-21 RX ADMIN — CHLORHEXIDINE GLUCONATE 15 ML: 1.2 RINSE ORAL at 08:08

## 2019-05-21 RX ADMIN — Medication 5 ML: at 16:30

## 2019-05-21 RX ADMIN — ACYCLOVIR 400 MG: 800 TABLET ORAL at 17:14

## 2019-05-21 RX ADMIN — ACETAMINOPHEN: 500 TABLET, FILM COATED ORAL at 21:55

## 2019-05-21 RX ADMIN — Medication 5 ML: at 11:21

## 2019-05-21 RX ADMIN — ESCITALOPRAM OXALATE 10 MG: 10 TABLET ORAL at 08:08

## 2019-05-21 RX ADMIN — FLUCONAZOLE 200 MG: 100 TABLET ORAL at 08:08

## 2019-05-21 RX ADMIN — Medication 5 ML: at 07:35

## 2019-05-21 RX ADMIN — VANCOMYCIN HYDROCHLORIDE 1250 MG: 10 INJECTION, POWDER, LYOPHILIZED, FOR SOLUTION INTRAVENOUS at 22:33

## 2019-05-21 RX ADMIN — Medication 5 ML: at 22:00

## 2019-05-21 RX ADMIN — SODIUM CHLORIDE, PRESERVATIVE FREE 300 UNITS: 5 INJECTION INTRAVENOUS at 21:57

## 2019-05-21 RX ADMIN — SUCRALFATE 1 G: 1 TABLET ORAL at 11:20

## 2019-05-21 RX ADMIN — CEFEPIME HYDROCHLORIDE 2 G: 2 INJECTION, POWDER, FOR SOLUTION INTRAVENOUS at 01:15

## 2019-05-21 RX ADMIN — VANCOMYCIN HYDROCHLORIDE 1250 MG: 10 INJECTION, POWDER, LYOPHILIZED, FOR SOLUTION INTRAVENOUS at 11:20

## 2019-05-21 RX ADMIN — LORAZEPAM 1 MG: 1 TABLET ORAL at 21:57

## 2019-05-21 RX ADMIN — Medication 500 MG: at 11:20

## 2019-05-21 RX ADMIN — Medication 500 MG: at 07:35

## 2019-05-21 RX ADMIN — CEFEPIME HYDROCHLORIDE 2 G: 2 INJECTION, POWDER, FOR SOLUTION INTRAVENOUS at 08:10

## 2019-05-21 RX ADMIN — SUCRALFATE 1 G: 1 TABLET ORAL at 07:35

## 2019-05-21 RX ADMIN — PSYLLIUM HUSK 1 PACKET: 3.4 POWDER ORAL at 11:20

## 2019-05-21 RX ADMIN — POTASSIUM CHLORIDE 40 MEQ: 400 INJECTION, SOLUTION INTRAVENOUS at 07:35

## 2019-05-21 RX ADMIN — ALLOPURINOL 300 MG: 300 TABLET ORAL at 08:08

## 2019-05-21 RX ADMIN — POTASSIUM CHLORIDE 20 MEQ: 20 TABLET, EXTENDED RELEASE ORAL at 08:08

## 2019-05-21 RX ADMIN — ACYCLOVIR 400 MG: 800 TABLET ORAL at 08:08

## 2019-05-21 RX ADMIN — CEFEPIME HYDROCHLORIDE 2 G: 2 INJECTION, POWDER, FOR SOLUTION INTRAVENOUS at 16:10

## 2019-05-21 RX ADMIN — PANTOPRAZOLE SODIUM 40 MG: 40 TABLET, DELAYED RELEASE ORAL at 07:35

## 2019-05-21 RX ADMIN — CHLORHEXIDINE GLUCONATE 15 ML: 1.2 RINSE ORAL at 17:14

## 2019-05-21 RX ADMIN — SUCRALFATE 1 G: 1 TABLET ORAL at 21:57

## 2019-05-21 NOTE — PROGRESS NOTES
Problem: Falls - Risk of 
Goal: *Absence of Falls Description Document Mannie Saurav Fall Risk and appropriate interventions in the flowsheet. Outcome: Progressing Towards Goal 
Note:  
Fall Risk Interventions: 
  
 
  
 
Medication Interventions: Evaluate medications/consider consulting pharmacy, Teach patient to arise slowly Elimination Interventions: Call light in reach, Patient to call for help with toileting needs, Toilet paper/wipes in reach History of Falls Interventions: Consult care management for discharge planning, Evaluate medications/consider consulting pharmacy

## 2019-05-21 NOTE — PROGRESS NOTES
0700-Bedside report received from Jay Giron RN. Resting in bed. No needs voiced. No s/s of acute distress. 1800-END OF SHIFT NOTE: 
Pt's VSS and is in no acute distress. Pt awaiting count recovery. Pt had potassium bolus today. Intake/Output 05/21 0701 - 05/21 1900 In: 2906 [P.O.:760; I.V.:475] Out: 900 [Urine:900] Voiding: YES Catheter: NO 
Drain:   
 
 
Stool:  0 occurrences. Stool Assessment Stool Color: Martin Simmonds (05/18/19 0911) Stool Appearance: Soft (05/19/19 0830) Stool Amount: Small (05/18/19 0911) Stool Source/Status: Rectum (05/18/19 0911) Emesis:  0 occurrences. Emesis Assessment Appearance: Undigested food (05/18/19 0258) Emesis Amount: Small (05/18/19 0258) VITAL SIGNS Patient Vitals for the past 12 hrs: 
 Temp Pulse Resp BP SpO2  
05/21/19 1519 98.1 °F (36.7 °C) (!) 58 17 135/72 97 % 05/21/19 1114 98.4 °F (36.9 °C) (!) 58 16 123/70 97 % 05/21/19 0730 98.4 °F (36.9 °C) (!) 55 18 152/72 98 % Pain Assessment Pain 1 Pain Scale 1: Numeric (0 - 10) (05/21/19 1401) Pain Intensity 1: 0 (05/21/19 1401) Patient Stated Pain Goal: 0 (05/20/19 2007) Pain Reassessment 1: Yes (05/19/19 1420) Pain Onset 1: now (05/17/19 1600) Pain Location 1: Chest (05/17/19 1600) Pain Orientation 1: Mid (05/17/19 1600) Pain Description 1: Sore (05/17/19 1600) Pain Intervention(s) 1: Medication (see MAR) (05/17/19 1600) Ambulating Yes Mary Ann Rai 1845-Bedside shift change report given to Jay Giron RN (oncoming nurse) by oJnah Gómez RN (offgoing nurse). Report included the following information SBAR, Kardex, Intake/Output, MAR and Recent Results.

## 2019-05-21 NOTE — PROGRESS NOTES
END OF SHIFT NOTE: 
 
Intake/Output 05/20 1901 - 05/21 0700 In: 120 [P.O.:120] Out: 1100 [Urine:1100] Voiding: YES Catheter: NO 
Drain:   
 
 
 
 
 
Stool:  0 occurrences. Stool Assessment Stool Color: Harini Javan (05/18/19 0911) Stool Appearance: Soft (05/19/19 0830) Stool Amount: Small (05/18/19 0911) Stool Source/Status: Rectum (05/18/19 0911) Emesis:  0 occurrences. Emesis Assessment Appearance: Undigested food (05/18/19 0258) Emesis Amount: Small (05/18/19 0258) VITAL SIGNS Patient Vitals for the past 12 hrs: 
 Temp Pulse Resp BP SpO2  
05/21/19 0224 98.1 °F (36.7 °C) (!) 59 20 129/65 97 % 05/20/19 2327 98.4 °F (36.9 °C) (!) 58 18 142/59 98 % 05/20/19 2000 98.8 °F (37.1 °C) (!) 59 18 133/54 96 % Pain Assessment Pain 1 Pain Scale 1: Numeric (0 - 10) (05/20/19 2007) Pain Intensity 1: 0 (05/20/19 2007) Patient Stated Pain Goal: 0 (05/20/19 2007) Pain Reassessment 1: Yes (05/19/19 1420) Pain Onset 1: now (05/17/19 1600) Pain Location 1: Chest (05/17/19 1600) Pain Orientation 1: Mid (05/17/19 1600) Pain Description 1: Sore (05/17/19 1600) Pain Intervention(s) 1: Medication (see MAR) (05/17/19 1600) Ambulating Yes Additional Information: Afebrile. Port and peripheral blood cx drawn this morning K+ 3.3- IV bolus ordered Pt resting quietly. No visible s/sx of distress. No needs voiced at this time. Shift will be report given to oncoming nurse at the bedside.  
 
Leonie Patel RN

## 2019-05-21 NOTE — PROGRESS NOTES
Problem: Falls - Risk of 
Goal: *Absence of Falls Description Document Jeramy Parsons Fall Risk and appropriate interventions in the flowsheet. Outcome: Progressing Towards Goal 
  
Problem: Discharge Planning Goal: *Discharge to safe environment Outcome: Progressing Towards Goal 
Goal: *Knowledge of medication management Outcome: Progressing Towards Goal 
Goal: *Knowledge of discharge instructions Outcome: Progressing Towards Goal 
  
Problem: Anemia Care Plan (Adult and Pediatric) Goal: *Labs within defined limits Outcome: Progressing Towards Goal 
Goal: *Tolerates increased activity Outcome: Progressing Towards Goal 
  
Problem: Pain Goal: *Control of Pain Outcome: Progressing Towards Goal 
  
Problem: Nutrition Deficit Goal: *Optimize nutritional status Outcome: Progressing Towards Goal 
  
Problem: Chemotherapy, Day 3 to Discharge Goal: Off Pathway (Use only if patient is Off Pathway) Outcome: Progressing Towards Goal 
Goal: Activity/Safety Outcome: Progressing Towards Goal 
Goal: Consults, if ordered Outcome: Progressing Towards Goal 
Goal: Diagnostic Test/Procedures Outcome: Progressing Towards Goal 
Goal: Nutrition/Diet Outcome: Progressing Towards Goal 
Goal: Discharge Planning Outcome: Progressing Towards Goal 
Goal: Medications Outcome: Progressing Towards Goal 
Goal: Respiratory Outcome: Progressing Towards Goal 
Goal: Treatments/Interventions/Procedures Outcome: Progressing Towards Goal 
Goal: Psychosocial 
Outcome: Progressing Towards Goal 
Goal: *Optimal pain control at patient's stated goal 
Outcome: Progressing Towards Goal 
Goal: *Hemodynamically stable Outcome: Progressing Towards Goal 
Goal: *Adequate oxygenation Outcome: Progressing Towards Goal

## 2019-05-21 NOTE — INTERDISCIPLINARY ROUNDS
Interdisciplinary rounds with charge nurse, provider,  and . Presenting Problem: New dx AML/Chemotherapy Daily Goal: Echo, repeat blood cx, continue IV abx Expected Discharge Date: dependent upon count recovery Expected Discharge Needs: None at this time Patient/Family Concerns addressed.

## 2019-05-21 NOTE — PROGRESS NOTES
Infectious Disease Progress Note Today's Date: 2019 Admit Date: 2019 Impression: · Newly diagnosed AML, started induction chemotherapy on 19 · Fever · S/p PORT placement (), blood cultures growing Alpha strep 1/2 (Port)() · Pancytopenia Plan:  
· Continue Cefepime and Vancomycin for now · Await final Alpha strep ID and sensitivities · Continue Diflucan/ACV prophylaxis · Follow repeat blood cultures · May require port removal if DARIN Yeseniaisabel Peraza is finalized · Repeat TTE; R/O vegetation later this week · I called Micro today to ask for isolate to be worked up · ID will continue to follow Anti-infectives: · LVQ (-) ( - · Vanc/Cefepime (-) Restarted - Subjective: Co acute complaints today. No fevers. Chronic constipation secondary to IBS. No Known Allergies Review of Systems:  A comprehensive review of systems was negative except for that written in the History of Present Illness. Objective:  
 
Visit Vitals /72 (BP 1 Location: Right arm, BP Patient Position: At rest) Pulse (!) 55 Temp 98.4 °F (36.9 °C) Resp 18 Ht 5' 6\" (1.676 m) Wt 89.4 kg (197 lb 1.6 oz) SpO2 98% Breastfeeding? No  
BMI 31.81 kg/m² Temp (24hrs), Av.5 °F (36.9 °C), Min:98.1 °F (36.7 °C), Max:98.8 °F (37.1 °C) Lines:  R chest port w/o erythema or swelling Physical Exam:   
General:  Alert, cooperative, well noursished, well developed, appears stated age Eyes:  Sclera anicteric. Pupils equally round and reactive to light. Mouth/Throat: Mucous membranes normal, oral pharynx clear Neck: Supple Lungs:   Clear to auscultation bilaterally, good effort CV:  Regular rate and rhythm,no murmur, click, rub or gallop Abdomen:   Soft, non-tender. bowel sounds normal. non-distended Extremities: No cyanosis or edema Skin: Skin color, texture, turgor normal. no acute rash or lesions Lymph nodes: Cervical and supraclavicular normal  
Musculoskeletal: No swelling or deformity Lines/Devices:  Intact, no erythema, drainage or tenderness Psych: Alert and oriented, normal mood affect given the setting Data Review: CBC: 
Recent Labs  
  05/21/19 0300 05/20/19 0241 05/19/19 0419 WBC 0.2* 0.2* 0.1* GRANS 13* 25*  --   
MONOS 4 6  --   
EOS 0* 0*  --   
ANEU 0.0* 0.0*  -- ABL 0.2* 0.1*  --   
HGB 7.2* 7.5* 6.8* HCT 21.5* 22.4* 20.1*  
PLT 32* 38* 4* BMP: 
Recent Labs  
  05/21/19 0300 05/20/19 0241 05/19/19 0419 CREA 0.57* 0.65 0.70 BUN 13 11 15  139 135* K 3.3* 3.6 4.1  107 104 CO2 24 24 25 AGAP 8 8 6* GLU 96 88 97 LFTS: 
Recent Labs  
  05/21/19 0300 05/20/19 0241 05/19/19 0419 TBILI 0.3 0.3 0.4 ALT 14 10* 10* SGOT 4* 4* 4* AP 45* 46* 44*  
TP 5.1* 5.2* 5.2* ALB 2.4* 2.4* 2.4* Microbiology:  
 
All Micro Results Procedure Component Value Units Date/Time CULTURE, BLOOD [[id number]] Collected:  05/18/19 2040 Order Status:  Completed Specimen:  Blood Updated:  05/21/19 0940 Special Requests: --     
  LEFT 
HAND Culture result: NO GROWTH 3 DAYS     
 CULTURE, BLOOD [[id number]]  (Abnormal) Collected:  05/18/19 0830 Order Status:  Completed Specimen:  Blood Updated:  05/21/19 4111 Special Requests: --     
  NO SPECIAL REQUESTS SINGLE PORT 
  
  GRAM STAIN GRAM POS COCCI IN CHAINS ANAEROBIC BOTTLE POSITIVE RESULTS VERIFIED, PHONED TO AND READ BACK BY Lelo Modi RN AT 3394 ON 5/19/19  [facility] Culture result:    
  ALPHA STREPTOCOCCUS, NOT S. PNEUMONIAE  
     
      
  THIS ORGANISM MAY BE INDICATIVE OF CULTURE CONTAMINATION, HOWEVER, CLINICAL CORRELATION NEEDS TO BE EVALUATED, AS EACH CASE IS UNIQUE. CULTURE, BLOOD [[id number]] Collected:  05/21/19 0300 Order Status:  Completed Specimen:  Blood Updated:  05/21/19 0308 CULTURE, BLOOD [[id number]] Collected:  19 0301 Order Status:  Completed Specimen:  Blood Updated:  19 0308 CULTURE, URINE [[id number]] Collected:  19 1125 Order Status:  Completed Specimen:  Urine from Clean catch Updated:  19 0840 Special Requests: NO SPECIAL REQUESTS Culture result: NO GROWTH 2 DAYS     
 CULTURE, BLOOD [[id number]] Collected:  19 2241 Order Status:  Completed Specimen:  Blood Updated:  19 0747 Special Requests: --     
  RIGHT Antecubital 
  
  Culture result: NO GROWTH 5 DAYS     
 CULTURE, BLOOD [[id number]] Collected:  19 1251 Order Status:  Completed Specimen:  Blood Updated:  05/10/19 2795 Special Requests: SINGLE PORT Culture result: NO GROWTH 5 DAYS     
 CULTURE, BLOOD [[id number]] Collected:  19 1309 Order Status:  Completed Specimen:  Blood Updated:  05/10/19 9308 Special Requests: --     
  RIGHT Antecubital 
  
  Culture result: NO GROWTH 5 DAYS     
 CULTURE, URINE [[id number]] Collected:  19 2321 Order Status:  Completed Specimen:  Urine from Clean catch Updated:  05/10/19 2185 Special Requests: NO SPECIAL REQUESTS Culture result: NO GROWTH 2 DAYS Imagin/18/19 CXR (-) Signed By: Tian Molbey NP May 21, 2019

## 2019-05-22 LAB
ALBUMIN SERPL-MCNC: 2.5 G/DL (ref 3.2–4.6)
ALBUMIN/GLOB SERPL: 0.9 {RATIO} (ref 1.2–3.5)
ALP SERPL-CCNC: 47 U/L (ref 50–136)
ALT SERPL-CCNC: 17 U/L (ref 12–65)
ANION GAP SERPL CALC-SCNC: 6 MMOL/L (ref 7–16)
AST SERPL-CCNC: 8 U/L (ref 15–37)
BACTERIA SPEC CULT: ABNORMAL
BACTERIA SPEC CULT: ABNORMAL
BILIRUB SERPL-MCNC: 0.4 MG/DL (ref 0.2–1.1)
BUN SERPL-MCNC: 10 MG/DL (ref 8–23)
CALCIUM SERPL-MCNC: 8 MG/DL (ref 8.3–10.4)
CHLORIDE SERPL-SCNC: 107 MMOL/L (ref 98–107)
CO2 SERPL-SCNC: 26 MMOL/L (ref 21–32)
CREAT SERPL-MCNC: 0.44 MG/DL (ref 0.6–1)
DIFFERENTIAL METHOD BLD: ABNORMAL
ERYTHROCYTE [DISTWIDTH] IN BLOOD BY AUTOMATED COUNT: 14.6 % (ref 11.9–14.6)
GLOBULIN SER CALC-MCNC: 2.8 G/DL (ref 2.3–3.5)
GLUCOSE SERPL-MCNC: 84 MG/DL (ref 65–100)
GRAM STN SPEC: ABNORMAL
HCT VFR BLD AUTO: 21.5 % (ref 35.8–46.3)
HGB BLD-MCNC: 7.1 G/DL (ref 11.7–15.4)
LDH SERPL L TO P-CCNC: 126 U/L (ref 110–210)
MAGNESIUM SERPL-MCNC: 2.2 MG/DL (ref 1.8–2.4)
MCH RBC QN AUTO: 31.1 PG (ref 26.1–32.9)
MCHC RBC AUTO-ENTMCNC: 33 G/DL (ref 31.4–35)
MCV RBC AUTO: 94.3 FL (ref 79.6–97.8)
NRBC # BLD: 0 K/UL (ref 0–0.2)
PHOSPHATE SERPL-MCNC: 1.9 MG/DL (ref 2.3–3.7)
PLATELET # BLD AUTO: 23 K/UL (ref 150–450)
PLATELET COMMENTS,PCOM: ABNORMAL
PMV BLD AUTO: 10.3 FL (ref 9.4–12.3)
POTASSIUM SERPL-SCNC: 3.7 MMOL/L (ref 3.5–5.1)
PROT SERPL-MCNC: 5.3 G/DL (ref 6.3–8.2)
RBC # BLD AUTO: 2.28 M/UL (ref 4.05–5.2)
RBC MORPH BLD: ABNORMAL
SERVICE CMNT-IMP: ABNORMAL
SODIUM SERPL-SCNC: 139 MMOL/L (ref 136–145)
URATE SERPL-MCNC: 1.4 MG/DL (ref 2.6–6)
VANCOMYCIN TROUGH SERPL-MCNC: 14.1 UG/ML (ref 5–20)
WBC # BLD AUTO: 0.3 K/UL (ref 4.3–11.1)
WBC MORPH BLD: ABNORMAL

## 2019-05-22 PROCEDURE — 74011000250 HC RX REV CODE- 250: Performed by: NURSE PRACTITIONER

## 2019-05-22 PROCEDURE — 65270000029 HC RM PRIVATE

## 2019-05-22 PROCEDURE — 80202 ASSAY OF VANCOMYCIN: CPT

## 2019-05-22 PROCEDURE — 74011000258 HC RX REV CODE- 258: Performed by: INTERNAL MEDICINE

## 2019-05-22 PROCEDURE — 80053 COMPREHEN METABOLIC PANEL: CPT

## 2019-05-22 PROCEDURE — 74011250636 HC RX REV CODE- 250/636: Performed by: INTERNAL MEDICINE

## 2019-05-22 PROCEDURE — 36591 DRAW BLOOD OFF VENOUS DEVICE: CPT

## 2019-05-22 PROCEDURE — 84100 ASSAY OF PHOSPHORUS: CPT

## 2019-05-22 PROCEDURE — 74011250637 HC RX REV CODE- 250/637: Performed by: INTERNAL MEDICINE

## 2019-05-22 PROCEDURE — 74011250637 HC RX REV CODE- 250/637: Performed by: NURSE PRACTITIONER

## 2019-05-22 PROCEDURE — 85025 COMPLETE CBC W/AUTO DIFF WBC: CPT

## 2019-05-22 PROCEDURE — 83615 LACTATE (LD) (LDH) ENZYME: CPT

## 2019-05-22 PROCEDURE — 99232 SBSQ HOSP IP/OBS MODERATE 35: CPT | Performed by: INTERNAL MEDICINE

## 2019-05-22 PROCEDURE — 83735 ASSAY OF MAGNESIUM: CPT

## 2019-05-22 PROCEDURE — 84550 ASSAY OF BLOOD/URIC ACID: CPT

## 2019-05-22 RX ADMIN — SUCRALFATE 1 G: 1 TABLET ORAL at 21:16

## 2019-05-22 RX ADMIN — VANCOMYCIN HYDROCHLORIDE 1250 MG: 10 INJECTION, POWDER, LYOPHILIZED, FOR SOLUTION INTRAVENOUS at 12:26

## 2019-05-22 RX ADMIN — POTASSIUM CHLORIDE 20 MEQ: 20 TABLET, EXTENDED RELEASE ORAL at 09:42

## 2019-05-22 RX ADMIN — Medication 500 MG: at 17:17

## 2019-05-22 RX ADMIN — ERGOCALCIFEROL 50000 UNITS: 1.25 CAPSULE ORAL at 06:05

## 2019-05-22 RX ADMIN — Medication 5 ML: at 07:30

## 2019-05-22 RX ADMIN — FLUCONAZOLE 200 MG: 100 TABLET ORAL at 09:42

## 2019-05-22 RX ADMIN — ACETAMINOPHEN: 500 TABLET, FILM COATED ORAL at 21:16

## 2019-05-22 RX ADMIN — SUCRALFATE 1 G: 1 TABLET ORAL at 09:43

## 2019-05-22 RX ADMIN — SODIUM CHLORIDE, PRESERVATIVE FREE 300 UNITS: 5 INJECTION INTRAVENOUS at 21:16

## 2019-05-22 RX ADMIN — LORAZEPAM 1 MG: 1 TABLET ORAL at 21:16

## 2019-05-22 RX ADMIN — Medication 5 ML: at 11:41

## 2019-05-22 RX ADMIN — CEFEPIME HYDROCHLORIDE 2 G: 2 INJECTION, POWDER, FOR SOLUTION INTRAVENOUS at 11:32

## 2019-05-22 RX ADMIN — Medication 500 MG: at 11:32

## 2019-05-22 RX ADMIN — Medication 500 MG: at 09:42

## 2019-05-22 RX ADMIN — SUCRALFATE 1 G: 1 TABLET ORAL at 11:32

## 2019-05-22 RX ADMIN — CHLORHEXIDINE GLUCONATE 15 ML: 1.2 RINSE ORAL at 17:18

## 2019-05-22 RX ADMIN — PRAVASTATIN SODIUM 10 MG: 20 TABLET ORAL at 21:16

## 2019-05-22 RX ADMIN — POLYETHYLENE GLYCOL 3350 17 G: 17 POWDER, FOR SOLUTION ORAL at 09:58

## 2019-05-22 RX ADMIN — CEFTRIAXONE SODIUM 2 G: 2 INJECTION, POWDER, FOR SOLUTION INTRAMUSCULAR; INTRAVENOUS at 16:18

## 2019-05-22 RX ADMIN — SODIUM CHLORIDE, PRESERVATIVE FREE 300 UNITS: 5 INJECTION INTRAVENOUS at 17:18

## 2019-05-22 RX ADMIN — CEFEPIME HYDROCHLORIDE 2 G: 2 INJECTION, POWDER, FOR SOLUTION INTRAVENOUS at 03:16

## 2019-05-22 RX ADMIN — ALLOPURINOL 300 MG: 300 TABLET ORAL at 09:43

## 2019-05-22 RX ADMIN — ESCITALOPRAM OXALATE 10 MG: 10 TABLET ORAL at 09:43

## 2019-05-22 RX ADMIN — ACYCLOVIR 400 MG: 800 TABLET ORAL at 17:17

## 2019-05-22 RX ADMIN — CHLORHEXIDINE GLUCONATE 15 ML: 1.2 RINSE ORAL at 09:43

## 2019-05-22 RX ADMIN — ACYCLOVIR 400 MG: 800 TABLET ORAL at 09:43

## 2019-05-22 RX ADMIN — SODIUM CHLORIDE, PRESERVATIVE FREE 300 UNITS: 5 INJECTION INTRAVENOUS at 06:05

## 2019-05-22 RX ADMIN — Medication 5 ML: at 21:32

## 2019-05-22 RX ADMIN — PANTOPRAZOLE SODIUM 40 MG: 40 TABLET, DELAYED RELEASE ORAL at 09:43

## 2019-05-22 RX ADMIN — SUCRALFATE 1 G: 1 TABLET ORAL at 16:18

## 2019-05-22 NOTE — PROGRESS NOTES
Problem: Falls - Risk of 
Goal: *Absence of Falls Description Document Reilly Sanz Fall Risk and appropriate interventions in the flowsheet. Outcome: Progressing Towards Goal 
Note:  
Fall Risk Interventions: 
  
 
  
 
Medication Interventions: Evaluate medications/consider consulting pharmacy, Teach patient to arise slowly Elimination Interventions: Call light in reach, Toilet paper/wipes in reach, Patient to call for help with toileting needs History of Falls Interventions: Consult care management for discharge planning, Evaluate medications/consider consulting pharmacy

## 2019-05-22 NOTE — PROGRESS NOTES
END OF SHIFT NOTE: 
 
Intake/Output 05/21 1901 - 05/22 0700 In: 120 [P.O.:120] Out: 1950 [[id number]] Voiding: YES Catheter: NO 
Drain:   
 
 
 
 
 
Stool:  0 occurrences. Stool Assessment Stool Color: Silvia Frederick (05/18/19 0911) Stool Appearance: Soft (05/19/19 0830) Stool Amount: Small (05/18/19 0911) Stool Source/Status: Rectum (05/18/19 0911) Emesis:  0 occurrences. Emesis Assessment Appearance: Undigested food (05/18/19 0258) Emesis Amount: Small (05/18/19 0258) VITAL SIGNS Patient Vitals for the past 12 hrs: 
 Temp Pulse Resp BP SpO2  
05/22/19 0310 98.6 °F (37 °C) 62 17 133/78 96 % 05/21/19 2312 98.3 °F (36.8 °C) 61 18 153/76 98 % 05/21/19 1941 98.6 °F (37 °C) 67 18 139/59 97 % Pain Assessment Pain 1 Pain Scale 1: Numeric (0 - 10) (05/21/19 1957) Pain Intensity 1: 0 (05/21/19 1957) Patient Stated Pain Goal: 0 (05/21/19 1957) Pain Reassessment 1: Yes (05/19/19 1420) Pain Onset 1: now (05/17/19 1600) Pain Location 1: Chest (05/17/19 1600) Pain Orientation 1: Mid (05/17/19 1600) Pain Description 1: Sore (05/17/19 1600) Pain Intervention(s) 1: Medication (see MAR) (05/17/19 1600) Ambulating Yes Additional Information: Afebrile. Pt resting quietly. No visible s/sx of distress. No needs voiced at this time. Shift report will be given to oncoming nurse at the bedside.  
 
Buck Medrano RN

## 2019-05-22 NOTE — PROGRESS NOTES
Infectious Disease Progress Note Today's Date: 2019 Admit Date: 2019 Impression: · Newly diagnosed AML, started induction chemotherapy on 19 · Fever · S/p PORT placement (), blood cultures growing STREPTOCOCCUS PARASANGUINIS  1/2 (Port)(); repeat blood cultures  () NGTD · Pancytopenia Plan: · Discontinue Cefepime and Vancomycin · Start IV Rocephin to treat bacteriemia X 2 weeks past (-) BC; EOT 19 · Continue Diflucan/ACV prophylaxis · Repeat blood cultures ~ 7 days after therapy completed X 2 24 hrs apart · No need to remove port at this time · ID will continue to follow Anti-infectives: · LVQ (-) ( - · Vanc/Cefepime (-) Restarted - Subjective: Co acute complaints today. No fevers. Chronic constipation secondary to IBS. No Known Allergies Review of Systems:  A comprehensive review of systems was negative except for that written in the History of Present Illness. Objective:  
 
Visit Vitals /63 (BP 1 Location: Right arm, BP Patient Position: Sitting) Pulse 60 Temp 98 °F (36.7 °C) Resp 16 Ht 5' 6\" (1.676 m) Wt 89.5 kg (197 lb 4.8 oz) SpO2 98% Breastfeeding? No  
BMI 31.85 kg/m² Temp (24hrs), Av.4 °F (36.9 °C), Min:98 °F (36.7 °C), Max:98.6 °F (37 °C) Lines:  R chest port w/o erythema or swelling Physical Exam:   
General:  Alert, cooperative, well noursished, well developed, appears stated age Eyes:  Sclera anicteric. Pupils equally round and reactive to light. Mouth/Throat: Mucous membranes normal, oral pharynx clear Neck: Supple Lungs:   Clear to auscultation bilaterally, good effort CV:  Regular rate and rhythm, 2/6 systolic murmur murmur, click, rub or gallop Abdomen:   Soft, non-tender. bowel sounds normal. non-distended Extremities: No cyanosis or edema Skin: Skin color, texture, turgor normal. no acute rash or lesions Lymph nodes: Cervical and supraclavicular normal  
Musculoskeletal: No swelling or deformity Lines/Devices:  Intact, no erythema, drainage or tenderness Psych: Alert and oriented, normal mood affect given the setting Data Review: CBC: 
Recent Labs  
  05/22/19 0318 05/21/19 0300 05/20/19 
0241 WBC 0.3* 0.2* 0.2* GRANS  --  13* 25* MONOS  --  4 6 EOS  --  0* 0* ANEU  --  0.0* 0.0* ABL  --  0.2* 0.1* HGB 7.1* 7.2* 7.5* HCT 21.5* 21.5* 22.4*  
PLT 23* 32* 38* BMP: 
Recent Labs  
  05/22/19 0318 05/21/19 
1310 05/21/19 0300 05/20/19 
0241 CREA 0.44*  --  0.57* 0.65 BUN 10  --  13 11   --  138 139  
K 3.7 3.9 3.3* 3.6   --  106 107 CO2 26  --  24 24 AGAP 6*  --  8 8 GLU 84  --  96 88 LFTS: 
Recent Labs  
  05/22/19 0318 05/21/19 0300 05/20/19 
0241 TBILI 0.4 0.3 0.3 ALT 17 14 10* SGOT 8* 4* 4* AP 47* 45* 46* TP 5.3* 5.1* 5.2* ALB 2.5* 2.4* 2.4* Microbiology:  
 
All Micro Results Procedure Component Value Units Date/Time CULTURE, BLOOD [[id number]] Collected:  05/21/19 0300 Order Status:  Completed Specimen:  Blood Updated:  05/22/19 1027 Special Requests: --     
  NO SPECIAL REQUESTS PORT Culture result: NO GROWTH 1 DAY     
 CULTURE, BLOOD [[id number]] Collected:  05/21/19 0301 Order Status:  Completed Specimen:  Blood Updated:  05/22/19 1027 Special Requests: --     
  NO SPECIAL REQUESTS 
RIGHT 
HAND Culture result: NO GROWTH 1 DAY     
 CULTURE, BLOOD [[id number]] Collected:  05/18/19 6859 Order Status:  Completed Specimen:  Blood Updated:  05/22/19 1027 Special Requests: --     
  LEFT 
HAND Culture result: NO GROWTH 4 DAYS     
 CULTURE, BLOOD [[id number]]  (Abnormal)  (Susceptibility) Collected:  05/18/19 2320 Order Status:  Completed Specimen:  Blood Updated:  05/22/19 3180 Special Requests: SINGLE PORT     
  GRAM STAIN GRAM POS COCCI IN CHAINS ANAEROBIC BOTTLE POSITIVE RESULTS VERIFIED, PHONED TO AND READ BACK BY Paul Larry RN AT 4895 ON 19  SAKD Culture result: STREPTOCOCCUS PARASANGUINIS  
     
   ID AND SUSCEPTIBILITY REQUESTED BY DR. Vijaya Morelos  
 CULTURE, URINE [[id number]] Collected:  19 1125 Order Status:  Completed Specimen:  Urine from Clean catch Updated:  19 0840 Special Requests: NO SPECIAL REQUESTS Culture result: NO GROWTH 2 DAYS     
 CULTURE, BLOOD [[id number]] Collected:  19 2241 Order Status:  Completed Specimen:  Blood Updated:  19 0747 Special Requests: --     
  RIGHT Antecubital 
  
  Culture result: NO GROWTH 5 DAYS     
 CULTURE, BLOOD [[id number]] Collected:  19 1251 Order Status:  Completed Specimen:  Blood Updated:  05/10/19 3365 Special Requests: SINGLE PORT Culture result: NO GROWTH 5 DAYS     
 CULTURE, BLOOD [[id number]] Collected:  19 1309 Order Status:  Completed Specimen:  Blood Updated:  05/10/19 3283 Special Requests: --     
  RIGHT Antecubital 
  
  Culture result: NO GROWTH 5 DAYS     
 CULTURE, URINE [[id number]] Collected:  19 2321 Order Status:  Completed Specimen:  Urine from Clean catch Updated:  05/10/19 6130 Special Requests: NO SPECIAL REQUESTS Culture result: NO GROWTH 2 DAYS Imagin/18/19 CXR (-) Signed By: Mark Gudino NP May 22, 2019

## 2019-05-22 NOTE — PROGRESS NOTES
[facility] & On[facility] Inpatient Hematology / Oncology Progress Note Admission Date: 2019 10:53 AM 
Reason for Admission/Hospital Course: Admission for antineoplastic chemotherapy [Z51.11] 24 Hour Events: VSS/afebrile Day 14 post 7+3 BC from port +GPC alpha strep parasanguinis susceptible to vanc BC repeat  NTD-ID following ROS: 
Constitutional:  negative for fever, chills. +weakness, malaise, fatigue. CV:  negative for chest pain, palpitations, edema. Respiratory: negative for dyspnea, cough, wheezing. GI: negative abdominal pain, diarrhea. + nausea/vomiting. 10 point review of systems is otherwise negative with the exception of the elements mentioned above in the HPI. No Known Allergies OBJECTIVE: 
Patient Vitals for the past 8 hrs: 
 BP Temp Pulse Resp SpO2  
19 0739 145/78 98.5 °F (36.9 °C) 73 18 97 % 19 0310 133/78 98.6 °F (37 °C) 62 17 96 % Temp (24hrs), Av.4 °F (36.9 °C), Min:98.1 °F (36.7 °C), Max:98.6 °F (37 °C) 
 
 0701 -  1900 In: 360 [P.O.:360] Out: - Physical Exam: 
Constitutional: Well developed, well nourished female in no acute distress, sitting comfortably in bed HEENT: Normocephalic and atraumatic. Oropharynx is clear, mucous membranes are moist. Extraocular muscles are intact. Sclerae anicteric. Skin Warm and dry. No rash noted. No erythema. No pallor. Respiratory Lungs are clear to auscultation bilaterally without wheezes, rales or rhonchi, normal air exchange without accessory muscle use. CVS Normal rate, regular rhythm and normal S1 and S2. No murmurs, gallops, or rubs. Abdomen Soft, nontender and nondistended, normoactive bowel sounds. Neuro Grossly nonfocal with no obvious sensory or motor deficits. MSK Normal range of motion in general. BLE edema improved Psych Appropriate mood and affect. Labs: 
   
Recent Labs  
  19 
0318 19 
0300 19 
0241 WBC 0.3* 0.2* 0.2*  
RBC 2.28* 2.29* 2.38* HGB 7.1* 7.2* 7.5* HCT 21.5* 21.5* 22.4* MCV 94.3 93.9 94.1 MCH 31.1 31.4 31.5 MCHC 33.0 33.5 33.5  
RDW 14.6 14.6 14.6 PLT 23* 32* 38* GRANS  --  13* 25* LYMPH  --  83* 69* MONOS  --  4 6 EOS  --  0* 0*  
BASOS  --  0 0 IG  --  0 0  
DF MANUAL AUTOMATED AUTOMATED ANEU  --  0.0* 0.0* ABL  --  0.2* 0.1* ABM  --  0.0* 0.0* ALEKSANDR  --  0.0 0.0 ABB  --  0.0 0.0 AIG  --  0.0 0.0 Recent Labs  
  05/22/19 
0318 05/21/19 
1310 05/21/19 
0300 05/20/19 
0241   --  138 139  
K 3.7 3.9 3.3* 3.6   --  106 107 CO2 26  --  24 24 AGAP 6*  --  8 8 GLU 84  --  96 88 BUN 10  --  13 11 CREA 0.44*  --  0.57* 0.65 GFRAA >60  --  >60 >60 GFRNA >60  --  >60 >60  
CA 8.0*  --  7.9* 8.1*  
SGOT 8*  --  4* 4* AP 47*  --  45* 46* TP 5.3*  --  5.1* 5.2* ALB 2.5*  --  2.4* 2.4*  
GLOB 2.8  --  2.7 2.8 AGRAT 0.9*  --  0.9* 0.9* MG 2.2  --  2.0 2.3 PHOS 1.9*  --  1.9* 2.7 Imaging: XR CHEST SNGL V [[id number]] Collected: 05/18/19 7827 Order Status: Completed Updated: 05/18/19 1802 Narrative:    
EXAM: Single view chest radiograph. INDICATION: 73 years Fever COMPARISON: None. FINDINGS: 
No focal lung opacity. No pneumothorax. No pleural effusion. The heart, 
mediastinum, jhoana, and pulmonary vasculature are within normal limits. No 
evidence of acute osseous abnormality. Right-sided chest port with tip 
terminating in the right atrium. Impression:    
IMPRESSION:  
1. No evidence of pneumonia. Medications: 
Current Facility-Administered Medications Medication Dose Route Frequency  Vancomycin Trough Reminder   Other ONCE  
 heparin (porcine) pf 300 Units  300 Units InterCATHeter Q8H  psyllium husk-aspartame (METAMUCIL FIBER) packet 1 Packet  1 Packet Oral BID PRN  
 0.9% sodium chloride infusion 250 mL  250 mL IntraVENous PRN  
  cefepime (MAXIPIME) 2 g in 0.9% sodium chloride (MBP/ADV) 100 mL  2 g IntraVENous Q8H  
 vancomycin (VANCOCIN) 1250 mg in  ml infusion  1,250 mg IntraVENous Q12H  potassium chloride (K-DUR, KLOR-CON) SR tablet 20 mEq  20 mEq Oral DAILY  chlorhexidine (PERIDEX) 0.12 % mouthwash 15 mL  15 mL Oral BID  midostaurin (RYDAPT) chemo capsule 50 mg (Patient Supplied)  50 mg Oral Q12H  
 sucralfate (CARAFATE) tablet 1 g  1 g Oral AC&HS  pantoprazole (PROTONIX) tablet 40 mg  40 mg Oral ACB  alum-mag hydroxide-simeth (MYLANTA) oral suspension 30 mL  30 mL Oral Q4H PRN  
 magic mouthwash (RENÉ) oral suspension 5 mL  5 mL Oral AC&HS  morphine injection 2 mg  2 mg IntraVENous Q4H PRN  
 HYDROcodone-acetaminophen (NORCO) 5-325 mg per tablet 1 Tab  1 Tab Oral Q6H PRN  psyllium husk-aspartame (METAMUCIL FIBER) packet 1 Packet  1 Packet Oral BID PRN  
 central line flush (saline) syringe 10 mL  10 mL InterCATHeter PRN  
 0.9% sodium chloride infusion 250 mL  250 mL IntraVENous PRN  polyethylene glycol (MIRALAX) packet 17 g  17 g Oral DAILY PRN  
 LORazepam (ATIVAN) injection 0.5 mg  0.5 mg IntraVENous Q6H PRN  
 acetaminophen (TYLENOL) tablet 650 mg  650 mg Oral PRN  
 diphenhydrAMINE (BENADRYL) capsule 25 mg  25 mg Oral PRN  
 acetaminophen/diphenhydrAMINE (TYLENOL PM EXT STR) 500/25 mg   Oral QHS  acyclovir (ZOVIRAX) tablet 400 mg  400 mg Oral BID  allopurinol (ZYLOPRIM) tablet 300 mg  300 mg Oral DAILY  calcium carbonate (OS-CHRIS) tablet 500 mg [elemental]  500 mg Oral TID WITH MEALS  escitalopram oxalate (LEXAPRO) tablet 10 mg  10 mg Oral DAILY  fluconazole (DIFLUCAN) tablet 200 mg  200 mg Oral DAILY  lidocaine-prilocaine (EMLA) 2.5-2.5 % cream   Topical PRN  
 LORazepam (ATIVAN) tablet 1 mg  1 mg Oral QHS  ondansetron (ZOFRAN ODT) tablet 8 mg  8 mg Oral Q8H PRN  pravastatin (PRAVACHOL) tablet 10 mg  10 mg Oral QHS  vit C-E-zinc cit-lutein-zeaxan 50 mg-15 unit- 4.5 mg-2.5 mg chew (OCU-ABDOUL) 1 Tab (Patient Supplied)  1 Tab Oral DAILY  ergocalciferol capsule 50,000 Units  50,000 Units Oral every Wednesday ASSESSMENT: 
 
Problem List  Date Reviewed: 4/30/2019 Codes Class Noted Acute myeloid leukemia not having achieved remission ([facility] ICD-10-CM: C92.00 ICD-9-CM: 205.00  5/9/2019 Admission for antineoplastic chemotherapy ICD-10-CM: Z51.11 ICD-9-CM: V58.11  5/5/2019 AML (acute myeloblastic leukemia) ([facility] ICD-10-CM: C92.00 ICD-9-CM: 205.00  4/28/2019 Weakness generalized ICD-10-CM: R53.1 ICD-9-CM: 780.79  4/28/2019 Pancytopenia ([facility] ICD-10-CM: W01.914 ICD-9-CM: 284.19  4/28/2019 Thrombocytopenia ([facility] ICD-10-CM: D69.6 ICD-9-CM: 287.5  4/27/2019 PLAN: 
AML FLT 3+ 
5/6  BMbx planned for Tuesday then wait for [facility] from port results to determine start date of  cycle one 7 + 3 with Midostaurin day 8-21. Platelets will need to be at least 50k for bx tomorrow 5/7. BMbx today-platelets prior. Also needed prbc today for hgb 6.9. 
5/08 Start 7+3 when afebrile per Dr. Mikki Greenberg. 5/09 Day 1 of 7+3. Monitor TLS labs. 5/10 Day 2 7+3. Tolerating well. 5/13 Day 5 7+3. Midostaurin on the way. 5/16 Day 8 7+3. Day one Rydapt. 5/19 Day 11 7+3, day 4 Rydapt. Pancytopenia related to chemotherapy 5/16 transfuse per [facility] SOPs 
  
Possible port infection 5/5 Day one vanc/cefe. Follow cultures. Do not use port. May need to have Echo if cultures positive. 5/6 BCNTD. Appears improved today. Continue to monitor. No fevers or other symptoms. 5/7 Day 3 vanc/cefe. Site appears even better today. 5/8 Does not appear infected. Site bruised. 5/9 BCx negative. Per ID okay to start treatment. 5/13 Completed 7 days Vanc/cef. Now on levaquin per ID. 5/20 BC from port +GPC alpha strep-ID consulted 5/21 BC repeated today. ID following for final read on previous cultures to determine if port needs to be removed. ECHO pending.  
5/22 Echo with no vegetation. BC + strep parasanguinis-ID following. Repeat BC NTD Neutropenic Fever 05/05 Pan-culture negative. On Vancomycin, Maxipime 05/08 Febrile overnight up to 102.5. Repeat cultures x 1. Continue antibiotics. Likely disease related. Consult ID for clearance. 05/09 BC remain negative. No fevers 48 hours. Continue vanc/cefe. 05/13 now only on LVQ per ID. 5/18 Neutropenic fever - 100.5. Pan-cultures obtained. Chest xray negative. Started on Vanc/Cef. 5/19 Afebrile. Port culture with gram positive cocci in chains, await further studies. Continue current antibiotics. 5/20 port +BC with GPC alpha strep-ID consulted Bradycardia 5/14 EKG with no significant change. [facility] SOPs Supportive care ACV/Diflucan 
LVQ dc'd while on cefe and vanc Goals and plan of care reviewed with the patient. All questions answered to the best of our ability. ARMANDO Dao [facility] [id number] [place] [address] Office : [phone] Fax : [phone]

## 2019-05-22 NOTE — PROGRESS NOTES
0900-Bedside report received from [facility]. Resting in bed. No needs voiced. No s/s of acute distress.

## 2019-05-22 NOTE — PROGRESS NOTES
Pharmacokinetic Consult to Pharmacist 
 
Tima Bennett is a 68 y.o. female being treated for bacteremia with vancomycin and cefepime. Height: 5' 6\" (167.6 cm)  Weight: 89.5 kg (197 lb 4.8 oz) Lab Results Component Value Date/Time BUN 10 05/22/2019 03:18 AM  
 Creatinine 0.44 (L) 05/22/2019 03:18 AM  
 WBC 0.3 (LL) 05/22/2019 03:18 AM  
  
Estimated Creatinine Clearance: 128.4 mL/min (A) (based on SCr of 0.44 mg/dL (L)). Lab Results Component Value Date/Time Vancomycin,trough 14.1 05/22/2019 11:02 AM  
 
 
Day 5 of vancomycin. Goal trough is 15-20. Trough stable. Move next forward to 2100 to push level to ~16 Will continue to follow patient. Thank you, Jone Schaumann, Pharm. D. Clinical Pharmacist 
[phone]

## 2019-05-23 LAB
ALBUMIN SERPL-MCNC: 2.6 G/DL (ref 3.2–4.6)
ALBUMIN/GLOB SERPL: 0.9 {RATIO} (ref 1.2–3.5)
ALP SERPL-CCNC: 52 U/L (ref 50–136)
ALT SERPL-CCNC: 24 U/L (ref 12–65)
ANION GAP SERPL CALC-SCNC: 8 MMOL/L (ref 7–16)
AST SERPL-CCNC: 16 U/L (ref 15–37)
BACTERIA SPEC CULT: NORMAL
BASOPHILS # BLD: 0 K/UL (ref 0–0.2)
BASOPHILS NFR BLD: 0 % (ref 0–2)
BILIRUB SERPL-MCNC: 0.3 MG/DL (ref 0.2–1.1)
BUN SERPL-MCNC: 9 MG/DL (ref 8–23)
CALCIUM SERPL-MCNC: 7.9 MG/DL (ref 8.3–10.4)
CHLORIDE SERPL-SCNC: 106 MMOL/L (ref 98–107)
CO2 SERPL-SCNC: 26 MMOL/L (ref 21–32)
CREAT SERPL-MCNC: 0.52 MG/DL (ref 0.6–1)
DIFFERENTIAL METHOD BLD: ABNORMAL
EOSINOPHIL # BLD: 0 K/UL (ref 0–0.8)
EOSINOPHIL NFR BLD: 0 % (ref 0.5–7.8)
ERYTHROCYTE [DISTWIDTH] IN BLOOD BY AUTOMATED COUNT: 14.6 % (ref 11.9–14.6)
GLOBULIN SER CALC-MCNC: 2.9 G/DL (ref 2.3–3.5)
GLUCOSE SERPL-MCNC: 84 MG/DL (ref 65–100)
HCT VFR BLD AUTO: 20.1 % (ref 35.8–46.3)
HGB BLD-MCNC: 6.7 G/DL (ref 11.7–15.4)
IMM GRANULOCYTES # BLD AUTO: 0 K/UL (ref 0–0.5)
IMM GRANULOCYTES NFR BLD AUTO: 0 % (ref 0–5)
LDH SERPL L TO P-CCNC: 140 U/L (ref 110–210)
LYMPHOCYTES # BLD: 0.3 K/UL (ref 0.5–4.6)
LYMPHOCYTES NFR BLD: 90 % (ref 13–44)
MAGNESIUM SERPL-MCNC: 2.2 MG/DL (ref 1.8–2.4)
MCH RBC QN AUTO: 31.3 PG (ref 26.1–32.9)
MCHC RBC AUTO-ENTMCNC: 33.3 G/DL (ref 31.4–35)
MCV RBC AUTO: 93.9 FL (ref 79.6–97.8)
MONOCYTES # BLD: 0 K/UL (ref 0.1–1.3)
MONOCYTES NFR BLD: 3 % (ref 4–12)
NEUTS SEG # BLD: 0 K/UL (ref 1.7–8.2)
NEUTS SEG NFR BLD: 7 % (ref 43–78)
NRBC # BLD: 0 K/UL (ref 0–0.2)
PHOSPHATE SERPL-MCNC: 2.2 MG/DL (ref 2.3–3.7)
PLATELET # BLD AUTO: 16 K/UL (ref 150–450)
PMV BLD AUTO: 10.8 FL (ref 9.4–12.3)
POTASSIUM SERPL-SCNC: 3.5 MMOL/L (ref 3.5–5.1)
PROT SERPL-MCNC: 5.5 G/DL (ref 6.3–8.2)
RBC # BLD AUTO: 2.14 M/UL (ref 4.05–5.2)
SERVICE CMNT-IMP: NORMAL
SODIUM SERPL-SCNC: 140 MMOL/L (ref 136–145)
URATE SERPL-MCNC: 1.5 MG/DL (ref 2.6–6)
WBC # BLD AUTO: 0.3 K/UL (ref 4.3–11.1)

## 2019-05-23 PROCEDURE — 84550 ASSAY OF BLOOD/URIC ACID: CPT

## 2019-05-23 PROCEDURE — 99232 SBSQ HOSP IP/OBS MODERATE 35: CPT | Performed by: INTERNAL MEDICINE

## 2019-05-23 PROCEDURE — 74011250637 HC RX REV CODE- 250/637: Performed by: INTERNAL MEDICINE

## 2019-05-23 PROCEDURE — 36591 DRAW BLOOD OFF VENOUS DEVICE: CPT

## 2019-05-23 PROCEDURE — 74011000250 HC RX REV CODE- 250: Performed by: NURSE PRACTITIONER

## 2019-05-23 PROCEDURE — 74011250636 HC RX REV CODE- 250/636: Performed by: INTERNAL MEDICINE

## 2019-05-23 PROCEDURE — 74011250637 HC RX REV CODE- 250/637: Performed by: NURSE PRACTITIONER

## 2019-05-23 PROCEDURE — 77030003560 HC NDL HUBR BARD -A

## 2019-05-23 PROCEDURE — 84100 ASSAY OF PHOSPHORUS: CPT

## 2019-05-23 PROCEDURE — 86923 COMPATIBILITY TEST ELECTRIC: CPT

## 2019-05-23 PROCEDURE — 65270000029 HC RM PRIVATE

## 2019-05-23 PROCEDURE — 77030039270 HC TU BLD FLTR CARD -A

## 2019-05-23 PROCEDURE — 36430 TRANSFUSION BLD/BLD COMPNT: CPT

## 2019-05-23 PROCEDURE — P9040 RBC LEUKOREDUCED IRRADIATED: HCPCS

## 2019-05-23 PROCEDURE — 74011000258 HC RX REV CODE- 258: Performed by: INTERNAL MEDICINE

## 2019-05-23 PROCEDURE — 83735 ASSAY OF MAGNESIUM: CPT

## 2019-05-23 PROCEDURE — 85025 COMPLETE CBC W/AUTO DIFF WBC: CPT

## 2019-05-23 PROCEDURE — 80053 COMPREHEN METABOLIC PANEL: CPT

## 2019-05-23 PROCEDURE — 86900 BLOOD TYPING SEROLOGIC ABO: CPT

## 2019-05-23 PROCEDURE — 83615 LACTATE (LD) (LDH) ENZYME: CPT

## 2019-05-23 RX ORDER — SIMETHICONE 80 MG
80 TABLET,CHEWABLE ORAL
Status: DISCONTINUED | OUTPATIENT
Start: 2019-05-23 | End: 2019-06-07 | Stop reason: HOSPADM

## 2019-05-23 RX ADMIN — SODIUM CHLORIDE, PRESERVATIVE FREE 300 UNITS: 5 INJECTION INTRAVENOUS at 03:40

## 2019-05-23 RX ADMIN — CEFTRIAXONE SODIUM 2 G: 2 INJECTION, POWDER, FOR SOLUTION INTRAMUSCULAR; INTRAVENOUS at 15:05

## 2019-05-23 RX ADMIN — ESCITALOPRAM OXALATE 10 MG: 10 TABLET ORAL at 07:41

## 2019-05-23 RX ADMIN — LORAZEPAM 1 MG: 1 TABLET ORAL at 21:57

## 2019-05-23 RX ADMIN — Medication 500 MG: at 17:21

## 2019-05-23 RX ADMIN — ACETAMINOPHEN: 500 TABLET, FILM COATED ORAL at 21:57

## 2019-05-23 RX ADMIN — SUCRALFATE 1 G: 1 TABLET ORAL at 13:54

## 2019-05-23 RX ADMIN — Medication 500 MG: at 07:42

## 2019-05-23 RX ADMIN — SUCRALFATE 1 G: 1 TABLET ORAL at 16:15

## 2019-05-23 RX ADMIN — SODIUM CHLORIDE, PRESERVATIVE FREE 300 UNITS: 5 INJECTION INTRAVENOUS at 21:58

## 2019-05-23 RX ADMIN — SUCRALFATE 1 G: 1 TABLET ORAL at 21:57

## 2019-05-23 RX ADMIN — FLUCONAZOLE 200 MG: 100 TABLET ORAL at 07:42

## 2019-05-23 RX ADMIN — Medication 5 ML: at 13:54

## 2019-05-23 RX ADMIN — DIPHENHYDRAMINE HYDROCHLORIDE 25 MG: 25 CAPSULE ORAL at 10:34

## 2019-05-23 RX ADMIN — ACETAMINOPHEN 650 MG: 325 TABLET, FILM COATED ORAL at 10:34

## 2019-05-23 RX ADMIN — Medication 10 ML: at 03:40

## 2019-05-23 RX ADMIN — ALLOPURINOL 300 MG: 300 TABLET ORAL at 07:42

## 2019-05-23 RX ADMIN — PRAVASTATIN SODIUM 10 MG: 20 TABLET ORAL at 21:57

## 2019-05-23 RX ADMIN — ACYCLOVIR 400 MG: 800 TABLET ORAL at 17:21

## 2019-05-23 RX ADMIN — ACYCLOVIR 400 MG: 800 TABLET ORAL at 07:42

## 2019-05-23 RX ADMIN — Medication 5 ML: at 21:59

## 2019-05-23 RX ADMIN — Medication 5 ML: at 07:40

## 2019-05-23 RX ADMIN — SUCRALFATE 1 G: 1 TABLET ORAL at 07:41

## 2019-05-23 RX ADMIN — Medication 5 ML: at 16:15

## 2019-05-23 RX ADMIN — PANTOPRAZOLE SODIUM 40 MG: 40 TABLET, DELAYED RELEASE ORAL at 07:43

## 2019-05-23 RX ADMIN — CHLORHEXIDINE GLUCONATE 15 ML: 1.2 RINSE ORAL at 07:41

## 2019-05-23 RX ADMIN — Medication 500 MG: at 13:54

## 2019-05-23 RX ADMIN — POLYETHYLENE GLYCOL 3350 17 G: 17 POWDER, FOR SOLUTION ORAL at 08:04

## 2019-05-23 RX ADMIN — CHLORHEXIDINE GLUCONATE 15 ML: 1.2 RINSE ORAL at 17:21

## 2019-05-23 RX ADMIN — SODIUM CHLORIDE, PRESERVATIVE FREE 300 UNITS: 5 INJECTION INTRAVENOUS at 16:15

## 2019-05-23 RX ADMIN — POTASSIUM CHLORIDE 20 MEQ: 20 TABLET, EXTENDED RELEASE ORAL at 07:41

## 2019-05-23 NOTE — PROGRESS NOTES
0700-Bedside report received from [facility]. Resting in bed. No needs voiced. No s/s of acute distress. 1020-Discussed do not use port order and she reports port has been fine to use for several days. Ordered to change order.

## 2019-05-23 NOTE — PROGRESS NOTES
END OF SHIFT NOTE: 
 
Intake/Output 05/22 1901 - 05/23 0700 In: 500 [P.O.:500] Out: 6483 [Urine:2275] Voiding: YES Catheter: NO 
Drain:   
 
 
 
 
 
Stool:  0 occurrences. Stool Assessment Stool Color: Wilmon Bence (05/18/19 0911) Stool Appearance: Soft (05/19/19 0830) Stool Amount: Small (05/18/19 0911) Stool Source/Status: Rectum (05/18/19 0911) Emesis:  0 occurrences. Emesis Assessment Appearance: Undigested food (05/18/19 0258) Emesis Amount: Small (05/18/19 0258) VITAL SIGNS Patient Vitals for the past 12 hrs: 
 Temp Pulse Resp BP SpO2  
05/23/19 0330 98 °F (36.7 °C) 60 17 142/79 96 % 05/22/19 2252 97.8 °F (36.6 °C) 65 18 145/74 98 % 05/22/19 1924 98.8 °F (37.1 °C) 66 17 145/71 98 % Pain Assessment Pain 1 Pain Scale 1: Numeric (0 - 10) (05/22/19 2038) Pain Intensity 1: 0 (05/22/19 2038) Patient Stated Pain Goal: 0 (05/22/19 2038) Pain Reassessment 1: Yes (05/19/19 1420) Pain Onset 1: now (05/17/19 1600) Pain Location 1: Chest (05/17/19 1600) Pain Orientation 1: Mid (05/17/19 1600) Pain Description 1: Sore (05/17/19 1600) Pain Intervention(s) 1: Medication (see MAR) (05/17/19 1600) Ambulating Yes Additional Information:  
Tmax 98.8 Pt ambulating in hallway last night. Pt resting quietly. No visible s/sx of distress. No needs voiced at this time. Shift report will be given to oncoming nurse at the bedside.  
 
Lori Marrufo RN

## 2019-05-23 NOTE — PROGRESS NOTES
Problem: Falls - Risk of 
Goal: *Absence of Falls Description Document [facility] [place] Fall Risk and appropriate interventions in the flowsheet. Outcome: Progressing Towards Goal 
Note:  
Fall Risk Interventions: 
  
 
  
 
Medication Interventions: Evaluate medications/consider consulting pharmacy, Teach patient to arise slowly Elimination Interventions: Call light in reach, Patient to call for help with toileting needs, Toilet paper/wipes in reach History of Falls Interventions: Consult care management for discharge planning, Evaluate medications/consider consulting pharmacy

## 2019-05-23 NOTE — PROGRESS NOTES
[facility] Hematology & Oncology Inpatient Hematology / Oncology Progress Note Admission Date: 2019 10:53 AM 
Reason for Admission/Hospital Course: Admission for antineoplastic chemotherapy [Z51.11] 24 Hour Events: VSS/afebrile Day 15 post 7+3 Complaining of excessive burping Will try simethecone ROS: 
Constitutional:  negative for fever, chills, weakness, malaise, fatigue. CV:  negative for chest pain, palpitations, edema. Respiratory: negative for dyspnea, cough, wheezing. GI: negative abdominal pain, diarrhea,  nausea/vomiting. 10 point review of systems is otherwise negative with the exception of the elements mentioned above in the HPI. No Known Allergies OBJECTIVE: 
Patient Vitals for the past 8 hrs: 
 BP Temp Pulse Resp SpO2  
19 0724 134/74 98.3 °F (36.8 °C) (!) 57 18 97 % 19 0330 142/79 98 °F (36.7 °C) 60 17 96 % Temp (24hrs), Av.2 °F (36.8 °C), Min:97.8 °F (36.6 °C), Max:98.8 °F (37.1 °C) 
 
 0701 -  1900 In: 480 [P.O.:480] Out: - Physical Exam: 
Constitutional: Well developed, well nourished female in no acute distress, sitting comfortably in bedside chair HEENT: Normocephalic and atraumatic. Oropharynx is clear, mucous membranes are moist. Extraocular muscles are intact. Sclerae anicteric. Skin Warm and dry. No rash noted. No erythema. No pallor. Respiratory Lungs are clear to auscultation bilaterally without wheezes, rales or rhonchi, normal air exchange without accessory muscle use. CVS Normal rate, regular rhythm and normal S1 and S2. No murmurs, gallops, or rubs. Abdomen Soft, nontender and nondistended, normoactive bowel sounds. Neuro Grossly nonfocal with no obvious sensory or motor deficits. MSK Normal range of motion in general. BLE edema improved Psych Appropriate mood and affect. Labs: 
   
Recent Labs  
  19 
0351 19 
0318 19 
0300 WBC 0.3* 0.3* 0.2*  
 RBC 2.14* 2.28* 2.29* HGB 6.7* 7.1* 7.2* HCT 20.1* 21.5* 21.5* MCV 93.9 94.3 93.9 MCH 31.3 31.1 31.4 MCHC 33.3 33.0 33.5  
RDW 14.6 14.6 14.6 PLT 16* 23* 32* GRANS 7*  --  13* LYMPH 90*  --  83* MONOS 3*  --  4  
EOS 0*  --  0* BASOS 0  --  0 IG 0  --  0  
DF AUTOMATED MANUAL AUTOMATED ANEU 0.0*  --  0.0* ABL 0.3*  --  0.2* ABM 0.0*  --  0.0* ALEKSANDR 0.0  --  0.0 ABB 0.0  --  0.0 AIG 0.0  --  0.0 Recent Labs  
  05/23/19 
0351 05/22/19 
0318 05/21/19 
1310 05/21/19 
0300  139  --  138  
K 3.5 3.7 3.9 3.3*  
 107  --  106 CO2 26 26  --  24 AGAP 8 6*  --  8  
GLU 84 84  --  96 BUN 9 10  --  13  
CREA 0.52* 0.44*  --  0.57* GFRAA >60 >60  --  >60  
GFRNA >60 >60  --  >60  
CA 7.9* 8.0*  --  7.9*  
SGOT 16 8*  --  4* AP 52 47*  --  45* TP 5.5* 5.3*  --  5.1* ALB 2.6* 2.5*  --  2.4*  
GLOB 2.9 2.8  --  2.7 AGRAT 0.9* 0.9*  --  0.9* MG 2.2 2.2  --  2.0 PHOS 2.2* 1.9*  --  1.9* Imaging: XR CHEST SNGL V [[id number]] Collected: 05/18/19 0875 Order Status: Completed Updated: 05/18/19 4308 Narrative:    
EXAM: Single view chest radiograph. INDICATION: 73 years Fever COMPARISON: None. FINDINGS: 
No focal lung opacity. No pneumothorax. No pleural effusion. The heart, 
mediastinum, jhoana, and pulmonary vasculature are within normal limits. No 
evidence of acute osseous abnormality. Right-sided chest port with tip 
terminating in the right atrium. Impression:    
IMPRESSION:  
1. No evidence of pneumonia. Medications: 
Current Facility-Administered Medications Medication Dose Route Frequency  cefTRIAXone (ROCEPHIN) 2 g in 0.9% sodium chloride (MBP/ADV) 50 mL  2 g IntraVENous Q24H  
 heparin (porcine) pf 300 Units  300 Units InterCATHeter Q8H  psyllium husk-aspartame (METAMUCIL FIBER) packet 1 Packet  1 Packet Oral BID PRN  
 0.9% sodium chloride infusion 250 mL  250 mL IntraVENous PRN  
  potassium chloride (K-DUR, KLOR-CON) SR tablet 20 mEq  20 mEq Oral DAILY  chlorhexidine (PERIDEX) 0.12 % mouthwash 15 mL  15 mL Oral BID  midostaurin (RYDAPT) chemo capsule 50 mg (Patient Supplied)  50 mg Oral Q12H  
 sucralfate (CARAFATE) tablet 1 g  1 g Oral AC&HS  pantoprazole (PROTONIX) tablet 40 mg  40 mg Oral ACB  alum-mag hydroxide-simeth (MYLANTA) oral suspension 30 mL  30 mL Oral Q4H PRN  
 magic mouthwash (RENÉ) oral suspension 5 mL  5 mL Oral AC&HS  morphine injection 2 mg  2 mg IntraVENous Q4H PRN  
 HYDROcodone-acetaminophen (NORCO) 5-325 mg per tablet 1 Tab  1 Tab Oral Q6H PRN  psyllium husk-aspartame (METAMUCIL FIBER) packet 1 Packet  1 Packet Oral BID PRN  
 central line flush (saline) syringe 10 mL  10 mL InterCATHeter PRN  
 0.9% sodium chloride infusion 250 mL  250 mL IntraVENous PRN  polyethylene glycol (MIRALAX) packet 17 g  17 g Oral DAILY PRN  
 LORazepam (ATIVAN) injection 0.5 mg  0.5 mg IntraVENous Q6H PRN  
 acetaminophen (TYLENOL) tablet 650 mg  650 mg Oral PRN  
 diphenhydrAMINE (BENADRYL) capsule 25 mg  25 mg Oral PRN  
 acetaminophen/diphenhydrAMINE (TYLENOL PM EXT STR) 500/25 mg   Oral QHS  acyclovir (ZOVIRAX) tablet 400 mg  400 mg Oral BID  allopurinol (ZYLOPRIM) tablet 300 mg  300 mg Oral DAILY  calcium carbonate (OS-CHRIS) tablet 500 mg [elemental]  500 mg Oral TID WITH MEALS  escitalopram oxalate (LEXAPRO) tablet 10 mg  10 mg Oral DAILY  fluconazole (DIFLUCAN) tablet 200 mg  200 mg Oral DAILY  lidocaine-prilocaine (EMLA) 2.5-2.5 % cream   Topical PRN  
 LORazepam (ATIVAN) tablet 1 mg  1 mg Oral QHS  ondansetron (ZOFRAN ODT) tablet 8 mg  8 mg Oral Q8H PRN  pravastatin (PRAVACHOL) tablet 10 mg  10 mg Oral QHS  vit C-E-zinc cit-lutein-zeaxan 50 mg-15 unit- 4.5 mg-2.5 mg chew (OCU-ABDOUL) 1 Tab (Patient Supplied)  1 Tab Oral DAILY  ergocalciferol capsule 50,000 Units  50,000 Units Oral every Wednesday ASSESSMENT: 
 
 Problem List  Date Reviewed: 4/30/2019 Codes Class Noted Acute myeloid leukemia not having achieved remission ([facility] ICD-10-CM: C92.00 ICD-9-CM: 205.00  5/9/2019 Admission for antineoplastic chemotherapy ICD-10-CM: Z51.11 ICD-9-CM: V58.11  5/5/2019 AML (acute myeloblastic leukemia) ([facility] ICD-10-CM: C92.00 ICD-9-CM: 205.00  4/28/2019 Weakness generalized ICD-10-CM: R53.1 ICD-9-CM: 780.79  4/28/2019 Pancytopenia ([facility] ICD-10-CM: P00.468 ICD-9-CM: 284.19  4/28/2019 Thrombocytopenia ([facility].) ICD-10-CM: D69.6 ICD-9-CM: 287.5  4/27/2019 PLAN: 
AML FLT 3+ 
5/6  BMbx planned for Tuesday then wait for [facility] SYSTEM from port results to determine start date of  cycle one 7 + 3 with Midostaurin day 8-21. Platelets will need to be at least 50k for bx tomorrow 5/7. BMbx today-platelets prior. Also needed prbc today for hgb 6.9. 
5/08 Start 7+3 when afebrile per Dr. Posadas Dec. 5/09 Day 1 of 7+3. Monitor TLS labs. 5/10 Day 2 7+3. Tolerating well. 5/13 Day 5 7+3. Midostaurin on the way. 5/16 Day 8 7+3. Day one Rydapt. 5/19 Day 11 7+3, day 4 Rydapt. Pancytopenia related to chemotherapy 5/16 transfuse per [facility] SOPs 
5/23 Hgb 6.7 transfuse 
  
Possible port infection 5/5 Day one vanc/cefe. Follow cultures. Do not use port. May need to have Echo if cultures positive. 5/6 BCNTD. Appears improved today. Continue to monitor. No fevers or other symptoms. 5/7 Day 3 vanc/cefe. Site appears even better today. 5/8 Does not appear infected. Site bruised. 5/9 BCx negative. Per ID okay to start treatment. 5/13 Completed 7 days Vanc/cef. Now on levaquin per ID. 5/20 BC from port +GPC alpha strep-ID consulted 5/21 BC repeated today. ID following for final read on previous cultures to determine if port needs to be removed. ECHO pending.  
5/22 Echo with no vegetation. BC + strep parasanguinis-ID following.  Repeat BC NTD 
 5/23 ID changed abx to Rocephin x 2 weeks EOT 6/15/19 then repeat BC x 2 post 7 days tx. No port removal needed. Neutropenic Fever 05/05 Pan-culture negative. On Vancomycin, Maxipime 05/08 Febrile overnight up to 102.5. Repeat cultures x 1. Continue antibiotics. Likely disease related. Consult ID for clearance. 05/09 BC remain negative. No fevers 48 hours. Continue vanc/cefe. 05/13 now only on LVQ per ID. 5/18 Neutropenic fever - 100.5. Pan-cultures obtained. Chest xray negative. Started on Vanc/Cef. 5/19 Afebrile. Port culture with gram positive cocci in chains, await further studies. Continue current antibiotics. 5/20 port +BC with GPC alpha strep-ID consulted Bradycardia 5/14 EKG with no significant change. [facility] SOPs Supportive care ACV/Diflucan 
LVQ dc'd while on cefe and vanc Goals and plan of care reviewed with the patient. All questions answered to the best of our ability. Grey Mckinley NP 
[address] Office : [phone] Fax : [phone]

## 2019-05-23 NOTE — INTERDISCIPLINARY ROUNDS
Interdisciplinary rounds with charge nurse, provider,  and . Presenting Problem: new dx AML/chemotherapy Daily Goal: transfuse PRBCs, supportive care per [facility] SOPs Expected Discharge Date: dependent upon count recovery Expected Discharge Needs: None at this time Patient/Family Concerns addressed.

## 2019-05-23 NOTE — PROGRESS NOTES
Chart reviewed for discharge planning Count Recovery in progress Case Management will continue to follow

## 2019-05-24 LAB
ABO + RH BLD: NORMAL
ALBUMIN SERPL-MCNC: 2.7 G/DL (ref 3.2–4.6)
ALBUMIN/GLOB SERPL: 1 {RATIO} (ref 1.2–3.5)
ALP SERPL-CCNC: 55 U/L (ref 50–136)
ALT SERPL-CCNC: 23 U/L (ref 12–65)
ANION GAP SERPL CALC-SCNC: 6 MMOL/L (ref 7–16)
AST SERPL-CCNC: 14 U/L (ref 15–37)
BASOPHILS # BLD: 0 K/UL (ref 0–0.2)
BASOPHILS NFR BLD: 0 % (ref 0–2)
BILIRUB SERPL-MCNC: 0.4 MG/DL (ref 0.2–1.1)
BLD PROD TYP BPU: NORMAL
BLOOD BANK CMNT PATIENT-IMP: NORMAL
BLOOD GROUP ANTIBODIES SERPL: NORMAL
BPU ID: NORMAL
BUN SERPL-MCNC: 8 MG/DL (ref 8–23)
CALCIUM SERPL-MCNC: 8 MG/DL (ref 8.3–10.4)
CHLORIDE SERPL-SCNC: 108 MMOL/L (ref 98–107)
CO2 SERPL-SCNC: 28 MMOL/L (ref 21–32)
CREAT SERPL-MCNC: 0.55 MG/DL (ref 0.6–1)
CROSSMATCH RESULT,%XM: NORMAL
DIFFERENTIAL METHOD BLD: ABNORMAL
EOSINOPHIL # BLD: 0 K/UL (ref 0–0.8)
EOSINOPHIL NFR BLD: 0 % (ref 0.5–7.8)
ERYTHROCYTE [DISTWIDTH] IN BLOOD BY AUTOMATED COUNT: 15.5 % (ref 11.9–14.6)
GLOBULIN SER CALC-MCNC: 2.6 G/DL (ref 2.3–3.5)
GLUCOSE SERPL-MCNC: 91 MG/DL (ref 65–100)
HCT VFR BLD AUTO: 22.4 % (ref 35.8–46.3)
HGB BLD-MCNC: 7.6 G/DL (ref 11.7–15.4)
IMM GRANULOCYTES # BLD AUTO: 0 K/UL (ref 0–0.5)
IMM GRANULOCYTES NFR BLD AUTO: 0 % (ref 0–5)
LDH SERPL L TO P-CCNC: 145 U/L (ref 110–210)
LYMPHOCYTES # BLD: 0.3 K/UL (ref 0.5–4.6)
LYMPHOCYTES NFR BLD: 91 % (ref 13–44)
MAGNESIUM SERPL-MCNC: 2.3 MG/DL (ref 1.8–2.4)
MCH RBC QN AUTO: 31.1 PG (ref 26.1–32.9)
MCHC RBC AUTO-ENTMCNC: 33.9 G/DL (ref 31.4–35)
MCV RBC AUTO: 91.8 FL (ref 79.6–97.8)
MONOCYTES # BLD: 0 K/UL (ref 0.1–1.3)
MONOCYTES NFR BLD: 3 % (ref 4–12)
NEUTS SEG # BLD: 0 K/UL (ref 1.7–8.2)
NEUTS SEG NFR BLD: 6 % (ref 43–78)
NRBC # BLD: 0 K/UL (ref 0–0.2)
PHOSPHATE SERPL-MCNC: 2.1 MG/DL (ref 2.3–3.7)
PLATELET # BLD AUTO: 12 K/UL (ref 150–450)
PMV BLD AUTO: 12.2 FL (ref 9.4–12.3)
POTASSIUM SERPL-SCNC: 3.7 MMOL/L (ref 3.5–5.1)
PROT SERPL-MCNC: 5.3 G/DL (ref 6.3–8.2)
RBC # BLD AUTO: 2.44 M/UL (ref 4.05–5.2)
SODIUM SERPL-SCNC: 142 MMOL/L (ref 136–145)
SPECIMEN EXP DATE BLD: NORMAL
STATUS OF UNIT,%ST: NORMAL
UNIT DIVISION, %UDIV: 0
URATE SERPL-MCNC: 1.7 MG/DL (ref 2.6–6)
WBC # BLD AUTO: 0.3 K/UL (ref 4.3–11.1)

## 2019-05-24 PROCEDURE — 84550 ASSAY OF BLOOD/URIC ACID: CPT

## 2019-05-24 PROCEDURE — 80053 COMPREHEN METABOLIC PANEL: CPT

## 2019-05-24 PROCEDURE — 84100 ASSAY OF PHOSPHORUS: CPT

## 2019-05-24 PROCEDURE — 83615 LACTATE (LD) (LDH) ENZYME: CPT

## 2019-05-24 PROCEDURE — 65270000029 HC RM PRIVATE

## 2019-05-24 PROCEDURE — 85025 COMPLETE CBC W/AUTO DIFF WBC: CPT

## 2019-05-24 PROCEDURE — 74011250636 HC RX REV CODE- 250/636: Performed by: INTERNAL MEDICINE

## 2019-05-24 PROCEDURE — 74011250637 HC RX REV CODE- 250/637: Performed by: NURSE PRACTITIONER

## 2019-05-24 PROCEDURE — 36591 DRAW BLOOD OFF VENOUS DEVICE: CPT

## 2019-05-24 PROCEDURE — 74011000250 HC RX REV CODE- 250: Performed by: NURSE PRACTITIONER

## 2019-05-24 PROCEDURE — 99232 SBSQ HOSP IP/OBS MODERATE 35: CPT | Performed by: INTERNAL MEDICINE

## 2019-05-24 PROCEDURE — 74011250637 HC RX REV CODE- 250/637: Performed by: INTERNAL MEDICINE

## 2019-05-24 PROCEDURE — 74011000258 HC RX REV CODE- 258: Performed by: INTERNAL MEDICINE

## 2019-05-24 PROCEDURE — 83735 ASSAY OF MAGNESIUM: CPT

## 2019-05-24 RX ORDER — SODIUM,POTASSIUM PHOSPHATES 280-250MG
1 POWDER IN PACKET (EA) ORAL 3 TIMES DAILY
Status: COMPLETED | OUTPATIENT
Start: 2019-05-24 | End: 2019-05-26

## 2019-05-24 RX ADMIN — SODIUM CHLORIDE, PRESERVATIVE FREE 300 UNITS: 5 INJECTION INTRAVENOUS at 22:17

## 2019-05-24 RX ADMIN — SUCRALFATE 1 G: 1 TABLET ORAL at 12:14

## 2019-05-24 RX ADMIN — SODIUM CHLORIDE, PRESERVATIVE FREE 300 UNITS: 5 INJECTION INTRAVENOUS at 16:32

## 2019-05-24 RX ADMIN — SUCRALFATE 1 G: 1 TABLET ORAL at 16:32

## 2019-05-24 RX ADMIN — Medication 500 MG: at 16:32

## 2019-05-24 RX ADMIN — CHLORHEXIDINE GLUCONATE 15 ML: 1.2 RINSE ORAL at 17:39

## 2019-05-24 RX ADMIN — SODIUM CHLORIDE, PRESERVATIVE FREE 300 UNITS: 5 INJECTION INTRAVENOUS at 05:08

## 2019-05-24 RX ADMIN — SUCRALFATE 1 G: 1 TABLET ORAL at 22:17

## 2019-05-24 RX ADMIN — CEFTRIAXONE SODIUM 2 G: 2 INJECTION, POWDER, FOR SOLUTION INTRAMUSCULAR; INTRAVENOUS at 16:32

## 2019-05-24 RX ADMIN — ACYCLOVIR 400 MG: 800 TABLET ORAL at 08:21

## 2019-05-24 RX ADMIN — Medication 5 ML: at 12:14

## 2019-05-24 RX ADMIN — PANTOPRAZOLE SODIUM 40 MG: 40 TABLET, DELAYED RELEASE ORAL at 08:20

## 2019-05-24 RX ADMIN — Medication 500 MG: at 08:20

## 2019-05-24 RX ADMIN — ALLOPURINOL 300 MG: 300 TABLET ORAL at 08:20

## 2019-05-24 RX ADMIN — ESCITALOPRAM OXALATE 10 MG: 10 TABLET ORAL at 08:20

## 2019-05-24 RX ADMIN — LORAZEPAM 1 MG: 1 TABLET ORAL at 22:16

## 2019-05-24 RX ADMIN — Medication 5 ML: at 08:23

## 2019-05-24 RX ADMIN — Medication 10 ML: at 22:17

## 2019-05-24 RX ADMIN — ACYCLOVIR 400 MG: 800 TABLET ORAL at 17:39

## 2019-05-24 RX ADMIN — POTASSIUM CHLORIDE 20 MEQ: 20 TABLET, EXTENDED RELEASE ORAL at 08:21

## 2019-05-24 RX ADMIN — POTASSIUM & SODIUM PHOSPHATES POWDER PACK 280-160-250 MG 1 PACKET: 280-160-250 PACK at 22:16

## 2019-05-24 RX ADMIN — ACETAMINOPHEN: 500 TABLET, FILM COATED ORAL at 22:17

## 2019-05-24 RX ADMIN — POTASSIUM & SODIUM PHOSPHATES POWDER PACK 280-160-250 MG 1 PACKET: 280-160-250 PACK at 08:21

## 2019-05-24 RX ADMIN — FLUCONAZOLE 200 MG: 100 TABLET ORAL at 08:20

## 2019-05-24 RX ADMIN — POTASSIUM & SODIUM PHOSPHATES POWDER PACK 280-160-250 MG 1 PACKET: 280-160-250 PACK at 16:32

## 2019-05-24 RX ADMIN — Medication 5 ML: at 16:33

## 2019-05-24 RX ADMIN — CHLORHEXIDINE GLUCONATE 15 ML: 1.2 RINSE ORAL at 08:21

## 2019-05-24 RX ADMIN — PRAVASTATIN SODIUM 10 MG: 20 TABLET ORAL at 22:17

## 2019-05-24 RX ADMIN — Medication 500 MG: at 12:14

## 2019-05-24 RX ADMIN — SUCRALFATE 1 G: 1 TABLET ORAL at 08:21

## 2019-05-24 RX ADMIN — Medication 5 ML: at 22:00

## 2019-05-24 NOTE — PROGRESS NOTES
[facility] Hematology & Oncology Inpatient Hematology / Oncology Progress Note Admission Date: 2019 10:53 AM 
Reason for Admission/Hospital Course: Admission for antineoplastic chemotherapy [Z51.11] 24 Hour Events: VSS/afebrile Day 16 post 7+3 No transfusions needed today ROS: 
Constitutional:  negative for fever, chills, weakness, malaise, fatigue. CV:  negative for chest pain, palpitations, edema. Respiratory: negative for dyspnea, cough, wheezing. GI: negative abdominal pain, diarrhea,  nausea/vomiting. 10 point review of systems is otherwise negative with the exception of the elements mentioned above in the HPI. No Known Allergies OBJECTIVE: 
Patient Vitals for the past 8 hrs: 
 BP Temp Pulse Resp SpO2  
19 0716 146/57 98.2 °F (36.8 °C) 61 18 97 % 19 0325 146/55 98.2 °F (36.8 °C) (!) 59 18 96 % Temp (24hrs), Av.1 °F (36.7 °C), Min:97.9 °F (36.6 °C), Max:98.4 °F (36.9 °C) No intake/output data recorded. Physical Exam: 
Constitutional: Well developed, well nourished female in no acute distress, sitting comfortably on bed HEENT: Normocephalic and atraumatic. Oropharynx is clear, mucous membranes are moist. Extraocular muscles are intact. Sclerae anicteric. Skin Warm and dry. No rash noted. No erythema. No pallor. Respiratory Lungs are clear to auscultation bilaterally without wheezes, rales or rhonchi, normal air exchange without accessory muscle use. CVS Normal rate, regular rhythm and normal S1 and S2. No murmurs, gallops, or rubs. Abdomen Soft, nontender and nondistended, normoactive bowel sounds. Neuro Grossly nonfocal with no obvious sensory or motor deficits. MSK Normal range of motion in general. BLE edema improved Psych Appropriate mood and affect. Labs: 
   
Recent Labs  
  19 
0224 19 
0351 19 
1706 WBC 0.3* 0.3* 0.3*  
RBC 2.44* 2.14* 2.28* HGB 7.6* 6.7* 7.1*  
 HCT 22.4* 20.1* 21.5* MCV 91.8 93.9 94.3 MCH 31.1 31.3 31.1 MCHC 33.9 33.3 33.0  
RDW 15.5* 14.6 14.6 PLT 12* 16* 23* GRANS 6* 7*  --   
LYMPH 91* 90*  --   
MONOS 3* 3*  --   
EOS 0* 0*  --   
BASOS 0 0  --   
IG 0 0  --   
DF AUTOMATED AUTOMATED MANUAL ANEU 0.0* 0.0*  -- ABL 0.3* 0.3*  --   
ABM 0.0* 0.0*  --   
ALEKSANDR 0.0 0.0  --   
ABB 0.0 0.0  --   
AIG 0.0 0.0  --   
 
  
Recent Labs  
  05/24/19 0224 05/23/19 0351 05/22/19 0318  140 139  
K 3.7 3.5 3.7 * 106 107 CO2 28 26 26 AGAP 6* 8 6* GLU 91 84 84 BUN 8 9 10 CREA 0.55* 0.52* 0.44* GFRAA >60 >60 >60 GFRNA >60 >60 >60  
CA 8.0* 7.9* 8.0*  
SGOT 14* 16 8* AP 55 52 47* TP 5.3* 5.5* 5.3* ALB 2.7* 2.6* 2.5*  
GLOB 2.6 2.9 2.8 AGRAT 1.0* 0.9* 0.9* MG 2.3 2.2 2.2 PHOS 2.1* 2.2* 1.9* Imaging: XR CHEST SNGL V [[id number]] Collected: 05/18/19 0855 Order Status: Completed Updated: 05/18/19 1522 Narrative:    
EXAM: Single view chest radiograph. INDICATION: 73 years Fever COMPARISON: None. FINDINGS: 
No focal lung opacity. No pneumothorax. No pleural effusion. The heart, 
mediastinum, jhoana, and pulmonary vasculature are within normal limits. No 
evidence of acute osseous abnormality. Right-sided chest port with tip 
terminating in the right atrium. Impression:    
IMPRESSION:  
1. No evidence of pneumonia. Medications: 
Current Facility-Administered Medications Medication Dose Route Frequency  potassium, sodium phosphates (NEUTRA-PHOS) packet 1 Packet  1 Packet Oral TID  simethicone (MYLICON) tablet 80 mg  80 mg Oral QID PRN  
 cefTRIAXone (ROCEPHIN) 2 g in 0.9% sodium chloride (MBP/ADV) 50 mL  2 g IntraVENous Q24H  
 heparin (porcine) pf 300 Units  300 Units InterCATHeter Q8H  psyllium husk-aspartame (METAMUCIL FIBER) packet 1 Packet  1 Packet Oral BID PRN  
 0.9% sodium chloride infusion 250 mL  250 mL IntraVENous PRN  
  potassium chloride (K-DUR, KLOR-CON) SR tablet 20 mEq  20 mEq Oral DAILY  chlorhexidine (PERIDEX) 0.12 % mouthwash 15 mL  15 mL Oral BID  midostaurin (RYDAPT) chemo capsule 50 mg (Patient Supplied)  50 mg Oral Q12H  
 sucralfate (CARAFATE) tablet 1 g  1 g Oral AC&HS  pantoprazole (PROTONIX) tablet 40 mg  40 mg Oral ACB  alum-mag hydroxide-simeth (MYLANTA) oral suspension 30 mL  30 mL Oral Q4H PRN  
 magic mouthwash (RENÉ) oral suspension 5 mL  5 mL Oral AC&HS  morphine injection 2 mg  2 mg IntraVENous Q4H PRN  
 HYDROcodone-acetaminophen (NORCO) 5-325 mg per tablet 1 Tab  1 Tab Oral Q6H PRN  psyllium husk-aspartame (METAMUCIL FIBER) packet 1 Packet  1 Packet Oral BID PRN  
 central line flush (saline) syringe 10 mL  10 mL InterCATHeter PRN  
 0.9% sodium chloride infusion 250 mL  250 mL IntraVENous PRN  polyethylene glycol (MIRALAX) packet 17 g  17 g Oral DAILY PRN  
 LORazepam (ATIVAN) injection 0.5 mg  0.5 mg IntraVENous Q6H PRN  
 acetaminophen (TYLENOL) tablet 650 mg  650 mg Oral PRN  
 diphenhydrAMINE (BENADRYL) capsule 25 mg  25 mg Oral PRN  
 acetaminophen/diphenhydrAMINE (TYLENOL PM EXT STR) 500/25 mg   Oral QHS  acyclovir (ZOVIRAX) tablet 400 mg  400 mg Oral BID  allopurinol (ZYLOPRIM) tablet 300 mg  300 mg Oral DAILY  calcium carbonate (OS-CHRIS) tablet 500 mg [elemental]  500 mg Oral TID WITH MEALS  escitalopram oxalate (LEXAPRO) tablet 10 mg  10 mg Oral DAILY  fluconazole (DIFLUCAN) tablet 200 mg  200 mg Oral DAILY  lidocaine-prilocaine (EMLA) 2.5-2.5 % cream   Topical PRN  
 LORazepam (ATIVAN) tablet 1 mg  1 mg Oral QHS  ondansetron (ZOFRAN ODT) tablet 8 mg  8 mg Oral Q8H PRN  pravastatin (PRAVACHOL) tablet 10 mg  10 mg Oral QHS  vit C-E-zinc cit-lutein-zeaxan 50 mg-15 unit- 4.5 mg-2.5 mg chew (OCU-ABDOUL) 1 Tab (Patient Supplied)  1 Tab Oral DAILY  ergocalciferol capsule 50,000 Units  50,000 Units Oral every Wednesday ASSESSMENT: 
 
 Problem List  Date Reviewed: 4/30/2019 Codes Class Noted Acute myeloid leukemia not having achieved remission ([facility] ICD-10-CM: C92.00 ICD-9-CM: 205.00  5/9/2019 Admission for antineoplastic chemotherapy ICD-10-CM: Z51.11 ICD-9-CM: V58.11  5/5/2019 AML (acute myeloblastic leukemia) ([facility] ICD-10-CM: C92.00 ICD-9-CM: 205.00  4/28/2019 Weakness generalized ICD-10-CM: R53.1 ICD-9-CM: 780.79  4/28/2019 Pancytopenia ([facility] ICD-10-CM: Y58.389 ICD-9-CM: 284.19  4/28/2019 Thrombocytopenia ([facility] ICD-10-CM: D69.6 ICD-9-CM: 287.5  4/27/2019 PLAN: 
AML FLT 3+ 
5/6  BMbx planned for Tuesday then wait for [facility] SYSTEM from port results to determine start date of  cycle one 7 + 3 with Midostaurin day 8-21. Platelets will need to be at least 50k for bx tomorrow 5/7. BMbx today-platelets prior. Also needed prbc today for hgb 6.9. 
5/08 Start 7+3 when afebrile per Dr. Vinay Lakhani. 5/09 Day 1 of 7+3. Monitor TLS labs. 5/10 Day 2 7+3. Tolerating well. 5/13 Day 5 7+3. Midostaurin on the way. 5/16 Day 8 7+3. Day one Rydapt. 5/19 Day 11 7+3, day 4 Rydapt. Pancytopenia related to chemotherapy 5/16 transfuse per [facility] SOPs 
5/23 Hgb 6.7 transfuse 
  
Possible port infection 5/5 Day one vanc/cefe. Follow cultures. Do not use port. May need to have Echo if cultures positive. 5/6 BCNTD. Appears improved today. Continue to monitor. No fevers or other symptoms. 5/7 Day 3 vanc/cefe. Site appears even better today. 5/8 Does not appear infected. Site bruised. 5/9 BCx negative. Per ID okay to start treatment. 5/13 Completed 7 days Vanc/cef. Now on levaquin per ID. 5/20 BC from port +GPC alpha strep-ID consulted 5/21 BC repeated today. ID following for final read on previous cultures to determine if port needs to be removed. ECHO pending.  
5/22 Echo with no vegetation. BC + strep parasanguinis-ID following.  Repeat BC NTD 
 5/23 ID changed abx to Rocephin x 2 weeks EOT 6/15/19 then repeat BC x 2 post 7 days tx. No port removal needed. Neutropenic Fever 05/05 Pan-culture negative. On Vancomycin, Maxipime 05/08 Febrile overnight up to 102.5. Repeat cultures x 1. Continue antibiotics. Likely disease related. Consult ID for clearance. 05/09 BC remain negative. No fevers 48 hours. Continue vanc/cefe. 05/13 now only on LVQ per ID. 5/18 Neutropenic fever - 100.5. Pan-cultures obtained. Chest xray negative. Started on Vanc/Cef. 5/19 Afebrile. Port culture with gram positive cocci in chains, await further studies. Continue current antibiotics. 5/20 port +BC with GPC alpha strep-ID consulted 5/23 ID changed abx to Rocephin x 2 weeks EOT 6/15/19 then repeat BC x 2 post 7 days tx. No port removal needed. Bradycardia 5/14 EKG with no significant change. [facility] SOPs Supportive care ACV/Diflucan 
LVQ dc'd while on cefe and vanc Patient is worried about having repeat BMbx due to bad experience with first one at [facility]/ We will try to arrange for BMbx with sedation in IR when appropriate. Goals and plan of care reviewed with the patient. All questions answered to the best of our ability. ARMANDO Crawford [id number] [place] [address] Office : [phone] Fax : [phone]

## 2019-05-24 NOTE — PROGRESS NOTES
Problem: Falls - Risk of 
Goal: *[facility]ence of [facility] in the flowsheet. Outcome: Progressing Towards Goal 
  
Problem: Pain Goal: *Control of Pain Outcome: Progressing Towards Goal 
  
Problem: Nutrition Deficit Goal: *Optimize nutritional status Outcome: Progressing Towards Goal

## 2019-05-24 NOTE — PROGRESS NOTES
END OF SHIFT NOTE: 
 
Intake/Output No intake/output data recorded. Voiding: YES Catheter: NO 
Drain:   
 
 
 
 
 
Stool:  2 occurrences. Stool Assessment Stool Color: Brown(per pt) (05/24/19 3121) Stool Appearance: Soft;Loose(per pt) (05/24/19 1458) Stool Amount: Medium(per pt) (05/24/19 1508) Stool Source/Status: Rectum (05/24/19 4238) Emesis:  0 occurrences. Emesis Assessment Appearance: Undigested food (05/18/19 0258) Emesis Amount: Small (05/18/19 0258) VITAL SIGNS Patient Vitals for the past 12 hrs: 
 Temp Pulse Resp BP SpO2  
05/24/19 1638  65 18 166/65 97 % 05/24/19 1543 98.2 °F (36.8 °C) 73 18 187/75 95 % 05/24/19 1145 98.3 °F (36.8 °C) 67 18 141/67 96 % 05/24/19 0716 98.2 °F (36.8 °C) 61 18 146/57 97 % Pain Assessment Pain 1 Pain Scale 1: Visual (05/24/19 1512) Pain Intensity 1: 0 (05/24/19 1512) Patient Stated Pain Goal: 0 (05/24/19 1512) Pain Reassessment 1: Patient sleeping (05/24/19 1512) Pain Onset 1: now (05/17/19 1600) Pain Location 1: Chest (05/17/19 1600) Pain Orientation 1: Mid (05/17/19 1600) Pain Description 1: Sore (05/17/19 1600) Pain Intervention(s) 1: Medication (see MAR) (05/17/19 1600) Ambulating Yes Additional Information:  
-Pt had 2 BMs today, no longer constipated.  
-Ambulating and eating well 
- at bedside Shift report given to oncoming nurse at the bedside.  
 
Deandre Phelps RN

## 2019-05-24 NOTE — PROGRESS NOTES
Problem: Falls - Risk of 
Goal: *Absence of Falls Description Document Josseline Mccracken Fall Risk and appropriate interventions in the flowsheet. Outcome: Progressing Towards Goal 
  
Problem: Pain Goal: *Control of Pain Outcome: Progressing Towards Goal 
  
Problem: Nutrition Deficit Goal: *Optimize nutritional status Outcome: Progressing Towards Goal

## 2019-05-24 NOTE — PROGRESS NOTES
END OF SHIFT NOTE: 
 
Intake/Output 05/23 1901 - 05/24 0700 In: -  
Out: 7715 [[facility]:[id number]] Voiding: YES Catheter: NO 
Drain:   
 
 
 
 
 
Stool:  0 occurrences. Stool Assessment Stool Color: Gemma Reeve (05/18/19 0911) Stool Appearance: Soft (05/19/19 0830) Stool Amount: Small (05/18/19 0911) Stool Source/Status: Rectum (05/18/19 0911) Emesis:  0 occurrences. Emesis Assessment Appearance: Undigested food (05/18/19 0258) Emesis Amount: Small (05/18/19 0258) VITAL SIGNS Patient Vitals for the past 12 hrs: 
 Temp Pulse Resp BP SpO2  
05/24/19 0325 98.2 °F (36.8 °C) (!) 59 18 146/55 96 % 05/23/19 2315 98 °F (36.7 °C) 65 18 151/60 94 % 05/23/19 1920 98.2 °F (36.8 °C) 70 18 141/55 97 % Pain Assessment Pain 1 Pain Scale 1: Numeric (0 - 10) (05/24/19 0105) Pain Intensity 1: 0 (05/24/19 0105) Patient Stated Pain Goal: 0 (05/24/19 0105) Pain Reassessment 1: Yes (05/19/19 1420) Pain Onset 1: now (05/17/19 1600) Pain Location 1: Chest (05/17/19 1600) Pain Orientation 1: Mid (05/17/19 1600) Pain Description 1: Sore (05/17/19 1600) Pain Intervention(s) 1: Medication (see MAR) (05/17/19 1600) Ambulating Yes Additional Information: Pt c/o constipation. Rested well this shift. VSS. No needs stated at this time. Shift report given to oncoming nurse at the bedside. Cole Penny

## 2019-05-24 NOTE — PROGRESS NOTES
Infectious Disease Progress Note Today's Date: 2019 Admit Date: 2019 Impression: · Newly diagnosed AML, started induction chemotherapy on 19 · Fever · S/p PORT placement (), blood cultures growing STREPTOCOCCUS PARASANGUINIS  1/2 (Port)(); repeat blood cultures  () NGTD · Pancytopenia Plan:  
· Continue Rocephin to treat bacteriemia X 2 weeks past (-) BC; EOT 19 · Repeat blood cultures ~ 7 days after therapy completed X 2 ,24 hrs apart · Continue Diflucan/ACV prophylaxis · No need to remove port at this time. We can attempt to treat thru. If, however, site was to worsen visibly with return WBC or have fever or pain etc then might need to reconsider this plan. · ID will continue to follow Anti-infectives: · LVQ (-) ( - · Vanc/Cefepime (-) Restarted - Subjective: No complaints. No recent dental work. She does think drools sometimes with CPAP at home. No Known Allergies Review of Systems:  A comprehensive review of systems was negative except for that written in the History of Present Illness. Objective:  
 
Visit Vitals /57 (BP 1 Location: Right arm, BP Patient Position: At rest) Pulse 61 Temp 98.2 °F (36.8 °C) Resp 18 Ht 5' 6\" (1.676 m) Wt 87.8 kg (193 lb 9.6 oz) SpO2 97% Breastfeeding? No  
BMI 31.25 kg/m² Temp (24hrs), Av.1 °F (36.7 °C), Min:97.9 °F (36.6 °C), Max:98.4 °F (36.9 °C) Lines:  R chest port w/o erythema or swelling Physical Exam:   
General:  Alert, cooperative, well noursished, well developed, appears stated age Eyes:  Sclera anicteric. Pupils equally round and reactive to light. Mouth/Throat: Mucous membranes normal, oral pharynx clear Neck: Supple Lungs:   Clear to auscultation bilaterally, good effort CV:  Regular rate and rhythm, 2/6 systolic murmur murmur, click, rub or gallop Abdomen:   Soft, non-tender. bowel sounds normal. non-distended Extremities: No cyanosis or edema Skin: Skin color, texture, turgor normal. no acute rash or lesions Lymph nodes: Cervical and supraclavicular normal  
Musculoskeletal: No swelling or deformity Lines/Devices:  Intact, brusing around port area fading. Not hot or red Psych: Alert and oriented, normal mood affect given the setting Data Review: CBC: 
Recent Labs  
  05/24/19 0224 05/23/19 0351 05/22/19 
4010 WBC 0.3* 0.3* 0.3* GRANS 6* 7*  --   
MONOS 3* 3*  --   
EOS 0* 0*  --   
ANEU 0.0* 0.0*  -- ABL 0.3* 0.3*  --   
HGB 7.6* 6.7* 7.1*  
HCT 22.4* 20.1* 21.5*  
PLT 12* 16* 23* BMP: 
Recent Labs  
  05/24/19 0224 05/23/19 0351 05/22/19 
3898 CREA 0.55* 0.52* 0.44* BUN 8 9 10  140 139  
K 3.7 3.5 3.7 * 106 107 CO2 28 26 26 AGAP 6* 8 6* GLU 91 84 84 LFTS: 
Recent Labs  
  05/24/19 0224 05/23/19 0351 05/22/19 
8036 TBILI 0.4 0.3 0.4 ALT 23 24 17 SGOT 14* 16 8* AP 55 52 47* TP 5.3* 5.5* 5.3* ALB 2.7* 2.6* 2.5* Microbiology:  
 
All Micro Results Procedure Component Value Units Date/Time CULTURE, BLOOD [[id number]] Collected:  05/21/19 0300 Order Status:  Completed Specimen:  Blood Updated:  05/24/19 4523 Special Requests: --     
  NO SPECIAL REQUESTS PORT Culture result: NO GROWTH 3 DAYS     
 CULTURE, BLOOD [[id number]] Collected:  05/21/19 0301 Order Status:  Completed Specimen:  Blood Updated:  05/24/19 8481 Special Requests: --     
  NO SPECIAL REQUESTS 
RIGHT 
HAND Culture result: NO GROWTH 3 DAYS     
 CULTURE, BLOOD [[id number]] Collected:  05/18/19 8108 Order Status:  Completed Specimen:  Blood Updated:  05/23/19 1025 Special Requests: --     
  LEFT 
HAND Culture result: NO GROWTH 5 DAYS     
 CULTURE, BLOOD [[id number]]  (Abnormal)  (Susceptibility) Collected:  05/18/19 8043 Order Status:  Completed Specimen:  Blood Updated:  05/22/19 2192   Special Requests: SINGLE PORT     
 GRAM STAIN GRAM POS COCCI IN CHAINS ANAEROBIC BOTTLE POSITIVE RESULTS VERIFIED, PHONED TO AND READ BACK BY Paul Larry RN AT 1834 ON 19  SAKD Culture result: STREPTOCOCCUS PARASANGUINIS  
     
   ID AND SUSCEPTIBILITY REQUESTED BY DR. Vijaya Morelos  
 CULTURE, URINE [[id number]] Collected:  19 1125 Order Status:  Completed Specimen:  Urine from Clean catch Updated:  19 0840 Special Requests: NO SPECIAL REQUESTS Culture result: NO GROWTH 2 DAYS     
 CULTURE, BLOOD [[id number]] Collected:  19 2241 Order Status:  Completed Specimen:  Blood Updated:  19 0747 Special Requests: --     
  RIGHT Antecubital 
  
  Culture result: NO GROWTH 5 DAYS     
 CULTURE, BLOOD [[id number]] Collected:  19 1251 Order Status:  Completed Specimen:  Blood Updated:  05/10/19 2458 Special Requests: SINGLE PORT Culture result: NO GROWTH 5 DAYS     
 CULTURE, BLOOD [[id number]] Collected:  19 1309 Order Status:  Completed Specimen:  Blood Updated:  05/10/19 1359 Special Requests: --     
  RIGHT Antecubital 
  
  Culture result: NO GROWTH 5 DAYS     
 CULTURE, URINE [[id number]] Collected:  19 2321 Order Status:  Completed Specimen:  Urine from Clean catch Updated:  05/10/19 3009 Special Requests: NO SPECIAL REQUESTS Culture result: NO GROWTH 2 DAYS Imagin/18/19 CXR (-) Signed By: Kaushal Pruitt MD   
 May 24, 2019

## 2019-05-25 LAB
ALBUMIN SERPL-MCNC: 2.8 G/DL (ref 3.2–4.6)
ALBUMIN/GLOB SERPL: 1 {RATIO} (ref 1.2–3.5)
ALP SERPL-CCNC: 63 U/L (ref 50–136)
ALT SERPL-CCNC: 28 U/L (ref 12–65)
ANION GAP SERPL CALC-SCNC: 6 MMOL/L (ref 7–16)
AST SERPL-CCNC: 12 U/L (ref 15–37)
BILIRUB SERPL-MCNC: 0.4 MG/DL (ref 0.2–1.1)
BUN SERPL-MCNC: 9 MG/DL (ref 8–23)
CALCIUM SERPL-MCNC: 8.2 MG/DL (ref 8.3–10.4)
CHLORIDE SERPL-SCNC: 107 MMOL/L (ref 98–107)
CO2 SERPL-SCNC: 29 MMOL/L (ref 21–32)
CREAT SERPL-MCNC: 0.51 MG/DL (ref 0.6–1)
DIFFERENTIAL METHOD BLD: ABNORMAL
ERYTHROCYTE [DISTWIDTH] IN BLOOD BY AUTOMATED COUNT: 15 % (ref 11.9–14.6)
GLOBULIN SER CALC-MCNC: 2.8 G/DL (ref 2.3–3.5)
GLUCOSE SERPL-MCNC: 97 MG/DL (ref 65–100)
HCT VFR BLD AUTO: 22.1 % (ref 35.8–46.3)
HGB BLD-MCNC: 7.4 G/DL (ref 11.7–15.4)
LDH SERPL L TO P-CCNC: 152 U/L (ref 110–210)
MAGNESIUM SERPL-MCNC: 2.2 MG/DL (ref 1.8–2.4)
MCH RBC QN AUTO: 31 PG (ref 26.1–32.9)
MCHC RBC AUTO-ENTMCNC: 33.5 G/DL (ref 31.4–35)
MCV RBC AUTO: 92.5 FL (ref 79.6–97.8)
NRBC # BLD: 0 K/UL (ref 0–0.2)
PHOSPHATE SERPL-MCNC: 2.7 MG/DL (ref 2.3–3.7)
PLATELET # BLD AUTO: 9 K/UL (ref 150–450)
PLATELET COMMENTS,PCOM: ABNORMAL
PMV BLD AUTO: 12.2 FL (ref 9.4–12.3)
POTASSIUM SERPL-SCNC: 3.9 MMOL/L (ref 3.5–5.1)
PROT SERPL-MCNC: 5.6 G/DL (ref 6.3–8.2)
RBC # BLD AUTO: 2.39 M/UL (ref 4.05–5.2)
RBC MORPH BLD: ABNORMAL
SODIUM SERPL-SCNC: 142 MMOL/L (ref 136–145)
URATE SERPL-MCNC: 1.8 MG/DL (ref 2.6–6)
WBC # BLD AUTO: 0.3 K/UL (ref 4.3–11.1)
WBC MORPH BLD: ABNORMAL

## 2019-05-25 PROCEDURE — 83735 ASSAY OF MAGNESIUM: CPT

## 2019-05-25 PROCEDURE — 84100 ASSAY OF PHOSPHORUS: CPT

## 2019-05-25 PROCEDURE — 84550 ASSAY OF BLOOD/URIC ACID: CPT

## 2019-05-25 PROCEDURE — 74011000258 HC RX REV CODE- 258: Performed by: INTERNAL MEDICINE

## 2019-05-25 PROCEDURE — 74011250637 HC RX REV CODE- 250/637: Performed by: NURSE PRACTITIONER

## 2019-05-25 PROCEDURE — 83615 LACTATE (LD) (LDH) ENZYME: CPT

## 2019-05-25 PROCEDURE — 36430 TRANSFUSION BLD/BLD COMPNT: CPT

## 2019-05-25 PROCEDURE — 74011250637 HC RX REV CODE- 250/637: Performed by: INTERNAL MEDICINE

## 2019-05-25 PROCEDURE — 85025 COMPLETE CBC W/AUTO DIFF WBC: CPT

## 2019-05-25 PROCEDURE — 74011250636 HC RX REV CODE- 250/636: Performed by: INTERNAL MEDICINE

## 2019-05-25 PROCEDURE — 99232 SBSQ HOSP IP/OBS MODERATE 35: CPT | Performed by: INTERNAL MEDICINE

## 2019-05-25 PROCEDURE — 86644 CMV ANTIBODY: CPT

## 2019-05-25 PROCEDURE — 36591 DRAW BLOOD OFF VENOUS DEVICE: CPT

## 2019-05-25 PROCEDURE — 77030039270 HC TU BLD FLTR CARD -A

## 2019-05-25 PROCEDURE — 65270000029 HC RM PRIVATE

## 2019-05-25 PROCEDURE — P9037 PLATE PHERES LEUKOREDU IRRAD: HCPCS

## 2019-05-25 PROCEDURE — 80053 COMPREHEN METABOLIC PANEL: CPT

## 2019-05-25 PROCEDURE — 74011000250 HC RX REV CODE- 250: Performed by: NURSE PRACTITIONER

## 2019-05-25 RX ORDER — SODIUM CHLORIDE 9 MG/ML
250 INJECTION, SOLUTION INTRAVENOUS AS NEEDED
Status: DISCONTINUED | OUTPATIENT
Start: 2019-05-25 | End: 2019-05-29

## 2019-05-25 RX ADMIN — POTASSIUM & SODIUM PHOSPHATES POWDER PACK 280-160-250 MG 1 PACKET: 280-160-250 PACK at 15:00

## 2019-05-25 RX ADMIN — PANTOPRAZOLE SODIUM 40 MG: 40 TABLET, DELAYED RELEASE ORAL at 08:04

## 2019-05-25 RX ADMIN — ESCITALOPRAM OXALATE 10 MG: 10 TABLET ORAL at 08:04

## 2019-05-25 RX ADMIN — SODIUM CHLORIDE, PRESERVATIVE FREE 300 UNITS: 5 INJECTION INTRAVENOUS at 06:09

## 2019-05-25 RX ADMIN — POTASSIUM & SODIUM PHOSPHATES POWDER PACK 280-160-250 MG 1 PACKET: 280-160-250 PACK at 22:05

## 2019-05-25 RX ADMIN — SODIUM CHLORIDE, PRESERVATIVE FREE 300 UNITS: 5 INJECTION INTRAVENOUS at 22:05

## 2019-05-25 RX ADMIN — POTASSIUM & SODIUM PHOSPHATES POWDER PACK 280-160-250 MG 1 PACKET: 280-160-250 PACK at 08:04

## 2019-05-25 RX ADMIN — LORAZEPAM 1 MG: 1 TABLET ORAL at 22:05

## 2019-05-25 RX ADMIN — Medication 5 ML: at 11:47

## 2019-05-25 RX ADMIN — Medication 10 ML: at 22:05

## 2019-05-25 RX ADMIN — PRAVASTATIN SODIUM 10 MG: 20 TABLET ORAL at 22:05

## 2019-05-25 RX ADMIN — Medication 5 ML: at 22:06

## 2019-05-25 RX ADMIN — SUCRALFATE 1 G: 1 TABLET ORAL at 11:46

## 2019-05-25 RX ADMIN — ACETAMINOPHEN: 500 TABLET, FILM COATED ORAL at 22:05

## 2019-05-25 RX ADMIN — CHLORHEXIDINE GLUCONATE 15 ML: 1.2 RINSE ORAL at 17:28

## 2019-05-25 RX ADMIN — SUCRALFATE 1 G: 1 TABLET ORAL at 22:05

## 2019-05-25 RX ADMIN — ACYCLOVIR 400 MG: 800 TABLET ORAL at 17:28

## 2019-05-25 RX ADMIN — Medication 500 MG: at 17:28

## 2019-05-25 RX ADMIN — Medication 5 ML: at 08:10

## 2019-05-25 RX ADMIN — FLUCONAZOLE 200 MG: 100 TABLET ORAL at 08:04

## 2019-05-25 RX ADMIN — CHLORHEXIDINE GLUCONATE 15 ML: 1.2 RINSE ORAL at 08:05

## 2019-05-25 RX ADMIN — ACYCLOVIR 400 MG: 800 TABLET ORAL at 08:06

## 2019-05-25 RX ADMIN — Medication 500 MG: at 08:04

## 2019-05-25 RX ADMIN — ACETAMINOPHEN 650 MG: 325 TABLET, FILM COATED ORAL at 09:32

## 2019-05-25 RX ADMIN — Medication 5 ML: at 16:02

## 2019-05-25 RX ADMIN — ALLOPURINOL 300 MG: 300 TABLET ORAL at 08:05

## 2019-05-25 RX ADMIN — SUCRALFATE 1 G: 1 TABLET ORAL at 16:02

## 2019-05-25 RX ADMIN — CEFTRIAXONE SODIUM 2 G: 2 INJECTION, POWDER, FOR SOLUTION INTRAMUSCULAR; INTRAVENOUS at 16:01

## 2019-05-25 RX ADMIN — Medication 500 MG: at 11:46

## 2019-05-25 RX ADMIN — POTASSIUM CHLORIDE 20 MEQ: 20 TABLET, EXTENDED RELEASE ORAL at 08:05

## 2019-05-25 RX ADMIN — SUCRALFATE 1 G: 1 TABLET ORAL at 08:04

## 2019-05-25 RX ADMIN — Medication 10 ML: at 06:09

## 2019-05-25 NOTE — PROGRESS NOTES
[facility] Hematology & Oncology Inpatient Hematology / Oncology Progress Note Admission Date: 2019 10:53 AM 
Reason for Admission/Hospital Course: Admission for antineoplastic chemotherapy [Z51.11] 24 Hour Events: VSS/afebrile Day 17 post 7+3 Platelets today In great spirits ROS: 
Constitutional:  negative for fever, chills, weakness, malaise, fatigue. CV:  negative for chest pain, palpitations, edema. Respiratory: negative for dyspnea, cough, wheezing. GI: negative abdominal pain, diarrhea,  nausea/vomiting. 10 point review of systems is otherwise negative with the exception of the elements mentioned above in the HPI. No Known Allergies OBJECTIVE: 
Patient Vitals for the past 8 hrs: 
 BP Temp Pulse Resp SpO2  
19 1059 135/51 98 °F (36.7 °C) 61 16 97 % 19 1020 135/55 98.1 °F (36.7 °C) 63 16 97 % 19 1006 134/53 98 °F (36.7 °C) 72 16 97 % 19 0722 149/60 98.3 °F (36.8 °C) 67 18 96 % 19 0720  98.3 °F (36.8 °C) 67   Temp (24hrs), Av.2 °F (36.8 °C), Min:97.7 °F (36.5 °C), Max:98.5 °F (36.9 °C) 
 
 0701 -  1900 In: 560 [P.O.:560] Out: 600 [Urine:600] Physical Exam: 
Constitutional: Well developed, well nourished female in no acute distress, sitting comfortably on bed HEENT: Normocephalic and atraumatic. Oropharynx is clear, mucous membranes are moist. Extraocular muscles are intact. Sclerae anicteric. Skin Warm and dry. No rash noted. No erythema. No pallor. Respiratory Lungs are clear to auscultation bilaterally without wheezes, rales or rhonchi, normal air exchange without accessory muscle use. CVS Normal rate, regular rhythm and normal S1 and S2. No murmurs, gallops, or rubs. Abdomen Soft, nontender and nondistended, normoactive bowel sounds. Neuro Grossly nonfocal with no obvious sensory or motor deficits. MSK Normal range of motion in general. BLE edema improved Psych Appropriate mood and affect. Labs: 
   
Recent Labs  
  05/25/19 
0306 05/24/19 0224 05/23/19 
0351 WBC 0.3* 0.3* 0.3*  
RBC 2.39* 2.44* 2.14* HGB 7.4* 7.6* 6.7* HCT 22.1* 22.4* 20.1* MCV 92.5 91.8 93.9 MCH 31.0 31.1 31.3 MCHC 33.5 33.9 33.3 RDW 15.0* 15.5* 14.6 PLT 9* 12* 16* GRANS  --  6* 7*  
LYMPH  --  91* 90* MONOS  --  3* 3* EOS  --  0* 0*  
BASOS  --  0 0 IG  --  0 0  
DF AUTOMATED AUTOMATED AUTOMATED ANEU  --  0.0* 0.0* ABL  --  0.3* 0.3* ABM  --  0.0* 0.0* ALEKSANDR  --  0.0 0.0 ABB  --  0.0 0.0 AIG  --  0.0 0.0 Recent Labs  
  05/25/19 0306 05/24/19 0224 05/23/19 
0351  142 140  
K 3.9 3.7 3.5  108* 106 CO2 29 28 26 AGAP 6* 6* 8  
GLU 97 91 84 BUN 9 8 9 CREA 0.51* 0.55* 0.52* GFRAA >60 >60 >60 GFRNA >60 >60 >60  
CA 8.2* 8.0* 7.9*  
SGOT 12* 14* 16  
AP 63 55 52  
TP 5.6* 5.3* 5.5* ALB 2.8* 2.7* 2.6*  
GLOB 2.8 2.6 2.9 AGRAT 1.0* 1.0* 0.9* MG 2.2 2.3 2.2 PHOS 2.7 2.1* 2.2* Imaging: XR CHEST SNGL V [[id number]] Collected: 05/18/19 0855 Order Status: Completed Updated: 05/18/19 8972 Narrative:    
EXAM: Single view chest radiograph. INDICATION: 73 years Fever COMPARISON: None. FINDINGS: 
No focal lung opacity. No pneumothorax. No pleural effusion. The heart, 
mediastinum, jhoana, and pulmonary vasculature are within normal limits. No 
evidence of acute osseous abnormality. Right-sided chest port with tip 
terminating in the right atrium. Impression:    
IMPRESSION:  
1. No evidence of pneumonia. Medications: 
Current Facility-Administered Medications Medication Dose Route Frequency  0.9% sodium chloride infusion 250 mL  250 mL IntraVENous PRN  potassium, sodium phosphates (NEUTRA-PHOS) packet 1 Packet  1 Packet Oral TID  simethicone (MYLICON) tablet 80 mg  80 mg Oral QID PRN  
 cefTRIAXone (ROCEPHIN) 2 g in 0.9% sodium chloride (MBP/ADV) 50 mL  2 g IntraVENous Q24H  heparin (porcine) pf 300 Units  300 Units InterCATHeter Q8H  psyllium husk-aspartame (METAMUCIL FIBER) packet 1 Packet  1 Packet Oral BID PRN  
 0.9% sodium chloride infusion 250 mL  250 mL IntraVENous PRN  potassium chloride (K-DUR, KLOR-CON) SR tablet 20 mEq  20 mEq Oral DAILY  chlorhexidine (PERIDEX) 0.12 % mouthwash 15 mL  15 mL Oral BID  midostaurin (RYDAPT) chemo capsule 50 mg (Patient Supplied)  50 mg Oral Q12H  
 sucralfate (CARAFATE) tablet 1 g  1 g Oral AC&HS  pantoprazole (PROTONIX) tablet 40 mg  40 mg Oral ACB  alum-mag hydroxide-simeth (MYLANTA) oral suspension 30 mL  30 mL Oral Q4H PRN  
 magic mouthwash (RENÉ) oral suspension 5 mL  5 mL Oral AC&HS  morphine injection 2 mg  2 mg IntraVENous Q4H PRN  
 HYDROcodone-acetaminophen (NORCO) 5-325 mg per tablet 1 Tab  1 Tab Oral Q6H PRN  psyllium husk-aspartame (METAMUCIL FIBER) packet 1 Packet  1 Packet Oral BID PRN  
 central line flush (saline) syringe 10 mL  10 mL InterCATHeter PRN  
 0.9% sodium chloride infusion 250 mL  250 mL IntraVENous PRN  polyethylene glycol (MIRALAX) packet 17 g  17 g Oral DAILY PRN  
 LORazepam (ATIVAN) injection 0.5 mg  0.5 mg IntraVENous Q6H PRN  
 acetaminophen (TYLENOL) tablet 650 mg  650 mg Oral PRN  
 diphenhydrAMINE (BENADRYL) capsule 25 mg  25 mg Oral PRN  
 acetaminophen/diphenhydrAMINE (TYLENOL PM EXT STR) 500/25 mg   Oral QHS  acyclovir (ZOVIRAX) tablet 400 mg  400 mg Oral BID  allopurinol (ZYLOPRIM) tablet 300 mg  300 mg Oral DAILY  calcium carbonate (OS-CHRIS) tablet 500 mg [elemental]  500 mg Oral TID WITH MEALS  escitalopram oxalate (LEXAPRO) tablet 10 mg  10 mg Oral DAILY  fluconazole (DIFLUCAN) tablet 200 mg  200 mg Oral DAILY  lidocaine-prilocaine (EMLA) 2.5-2.5 % cream   Topical PRN  
 LORazepam (ATIVAN) tablet 1 mg  1 mg Oral QHS  ondansetron (ZOFRAN ODT) tablet 8 mg  8 mg Oral Q8H PRN  pravastatin (PRAVACHOL) tablet 10 mg  10 mg Oral QHS  vit C-E-zinc cit-lutein-zeaxan 50 mg-15 unit- 4.5 mg-2.5 mg chew (OCU-ABDOUL) 1 Tab (Patient Supplied)  1 Tab Oral DAILY  ergocalciferol capsule 50,000 Units  50,000 Units Oral every Wednesday ASSESSMENT: 
 
Problem List  Date Reviewed: 4/30/2019 Codes Class Noted Acute myeloid leukemia not having achieved remission ([facility] ICD-10-CM: C92.00 ICD-9-CM: 205.00  5/9/2019 Admission for antineoplastic chemotherapy ICD-10-CM: Z51.11 ICD-9-CM: V58.11  5/5/2019 AML (acute myeloblastic leukemia) ([facility] ICD-10-CM: C92.00 ICD-9-CM: 205.00  4/28/2019 Weakness generalized ICD-10-CM: R53.1 ICD-9-CM: 780.79  4/28/2019 Pancytopenia ([facility] ICD-10-CM: H98.147 ICD-9-CM: 284.19  4/28/2019 Thrombocytopenia ([facility] ICD-10-CM: D69.6 ICD-9-CM: 287.5  4/27/2019 PLAN: 
AML FLT 3+ 
5/6  BMbx planned for Tuesday then wait for [facility] from port results to determine start date of  cycle one 7 + 3 with Midostaurin day 8-21. Platelets will need to be at least 50k for bx tomorrow 5/7. BMbx today-platelets prior. Also needed prbc today for hgb 6.9. 
5/08 Start 7+3 when afebrile per Dr. Bradley Sainz. 5/09 Day 1 of 7+3. Monitor TLS labs. 5/10 Day 2 7+3. Tolerating well. 5/13 Day 5 7+3. Midostaurin on the way. 5/16 Day 8 7+3. Day one Rydapt. 5/19 Day 11 7+3, day 4 Rydapt. Pancytopenia related to chemotherapy 5/16 transfuse per [facility] SOPs 
5/25 Hgb 6.6 transfuse 
  
Possible port infection 5/5 Day one vanc/cefe. Follow cultures. Do not use port. May need to have Echo if cultures positive. 5/6 BCNTD. Appears improved today. Continue to monitor. No fevers or other symptoms. 5/7 Day 3 vanc/cefe. Site appears even better today. 5/8 Does not appear infected. Site bruised. 5/9 BCx negative. Per ID okay to start treatment. 5/13 Completed 7 days Vanc/cef. Now on levaquin per ID. 5/20 BC from port +GPC alpha strep-ID consulted 5/21 BC repeated today. ID following for final read on previous cultures to determine if port needs to be removed. ECHO pending.  
5/22 Echo with no vegetation. BC + strep parasanguinis-ID following. Repeat BC NTD 
5/23 ID changed abx to Rocephin x 2 weeks EOT 6/15/19 then repeat BC x 2 post 7 days tx. No port removal needed. Neutropenic Fever 05/05 Pan-culture negative. On Vancomycin, Maxipime 05/08 Febrile overnight up to 102.5. Repeat cultures x 1. Continue antibiotics. Likely disease related. Consult ID for clearance. 05/09 BC remain negative. No fevers 48 hours. Continue vanc/cefe. 05/13 now only on LVQ per ID. 5/18 Neutropenic fever - 100.5. Pan-cultures obtained. Chest xray negative. Started on Vanc/Cef. 5/19 Afebrile. Port culture with gram positive cocci in chains, await further studies. Continue current antibiotics. 5/20 port +BC with GPC alpha strep-ID consulted 5/23 ID changed abx to Rocephin x 2 weeks EOT 6/15/19 then repeat BC x 2 post 7 days tx. No port removal needed. Bradycardia 5/14 EKG with no significant change. [facility] SOPs Supportive care ACV/Diflucan 
LVQ dc'd while on cefe and vanc Patient is worried about having repeat BMbx due to bad experience with first one at [facility]/ We will try to arrange for BMbx with sedation in IR when appropriate. Goals and plan of care reviewed with the patient. All questions answered to the best of our ability. Kellie Atkinson NP 
[facility] [id number] [place] [address] Office : [phone] Fax : [phone]

## 2019-05-25 NOTE — PROGRESS NOTES
Pt up ad sarai; walked multiple laps on floor. 1 unit plts transfused. Pt with no complaints. VSS; no fevers. Report given to oncoming RN.

## 2019-05-25 NOTE — PROGRESS NOTES
END OF SHIFT NOTE: 
 
Intake/Output 05/24 1901 - 05/25 0700 In: -  
Out: 2751 [[id number]] Voiding: YES Catheter: NO 
Drain:   
 
 
 
 
 
Stool:  0 occurrences. Stool Assessment Stool Color: Brown(per pt) (05/24/19 0168) Stool Appearance: Soft;Loose(per pt) (05/24/19 9536) Stool Amount: Medium(per pt) (05/24/19 4122) Stool Source/Status: Rectum (05/24/19 4097) Emesis:  0 occurrences. Emesis Assessment Appearance: Undigested food (05/18/19 0258) Emesis Amount: Small (05/18/19 0258) VITAL SIGNS Patient Vitals for the past 12 hrs: 
 Temp Pulse Resp BP SpO2  
05/25/19 0310 98.3 °F (36.8 °C) (!) 58 18 156/64 97 % 05/24/19 2221 97.7 °F (36.5 °C) 68 18 148/82 97 % 05/24/19 1910 98.5 °F (36.9 °C) 73 18 154/63 96 % Pain Assessment Pain 1 Pain Scale 1: Numeric (0 - 10) (05/25/19 0315) Pain Intensity 1: 0 (05/25/19 0315) Patient Stated Pain Goal: 0 (05/25/19 0315) Pain Reassessment 1: Patient sleeping (05/24/19 1512) Pain Onset 1: now (05/17/19 1600) Pain Location 1: Chest (05/17/19 1600) Pain Orientation 1: Mid (05/17/19 1600) Pain Description 1: Sore (05/17/19 1600) Pain Intervention(s) 1: Medication (see MAR) (05/17/19 1600) Ambulating Yes Additional Information: Patient rested well. Given rydapt pill this morning. No needs. Shift report given to oncoming nurse at the bedside. Arthur Costa

## 2019-05-25 NOTE — PROGRESS NOTES
Report received from [place], [facility]. Pt resting in bed, NAD.

## 2019-05-25 NOTE — PROGRESS NOTES
Problem: Falls - Risk of 
Goal: *Absence of Falls Description Document Marie Haynes Fall Risk and appropriate interventions in the flowsheet.  
Outcome: Progressing Towards Goal

## 2019-05-26 LAB
ALBUMIN SERPL-MCNC: 2.9 G/DL (ref 3.2–4.6)
ALBUMIN/GLOB SERPL: 1 {RATIO} (ref 1.2–3.5)
ALP SERPL-CCNC: 67 U/L (ref 50–136)
ALT SERPL-CCNC: 23 U/L (ref 12–65)
ANION GAP SERPL CALC-SCNC: 6 MMOL/L (ref 7–16)
AST SERPL-CCNC: 14 U/L (ref 15–37)
BACTERIA SPEC CULT: NORMAL
BACTERIA SPEC CULT: NORMAL
BILIRUB SERPL-MCNC: 0.3 MG/DL (ref 0.2–1.1)
BLD PROD TYP BPU: NORMAL
BPU ID: NORMAL
BUN SERPL-MCNC: 7 MG/DL (ref 8–23)
CALCIUM SERPL-MCNC: 8.4 MG/DL (ref 8.3–10.4)
CHLORIDE SERPL-SCNC: 108 MMOL/L (ref 98–107)
CO2 SERPL-SCNC: 29 MMOL/L (ref 21–32)
CREAT SERPL-MCNC: 0.55 MG/DL (ref 0.6–1)
DIFFERENTIAL METHOD BLD: ABNORMAL
ERYTHROCYTE [DISTWIDTH] IN BLOOD BY AUTOMATED COUNT: 14.5 % (ref 11.9–14.6)
GLOBULIN SER CALC-MCNC: 2.9 G/DL (ref 2.3–3.5)
GLUCOSE SERPL-MCNC: 86 MG/DL (ref 65–100)
HCT VFR BLD AUTO: 22.3 % (ref 35.8–46.3)
HGB BLD-MCNC: 7.4 G/DL (ref 11.7–15.4)
LDH SERPL L TO P-CCNC: 170 U/L (ref 110–210)
MAGNESIUM SERPL-MCNC: 2.4 MG/DL (ref 1.8–2.4)
MCH RBC QN AUTO: 30.8 PG (ref 26.1–32.9)
MCHC RBC AUTO-ENTMCNC: 33.2 G/DL (ref 31.4–35)
MCV RBC AUTO: 92.9 FL (ref 79.6–97.8)
NRBC # BLD: 0 K/UL (ref 0–0.2)
PHOSPHATE SERPL-MCNC: 3.3 MG/DL (ref 2.3–3.7)
PLATELET # BLD AUTO: 39 K/UL (ref 150–450)
PLATELET COMMENTS,PCOM: ABNORMAL
PMV BLD AUTO: 11.6 FL (ref 9.4–12.3)
POTASSIUM SERPL-SCNC: 4.1 MMOL/L (ref 3.5–5.1)
PROT SERPL-MCNC: 5.8 G/DL (ref 6.3–8.2)
RBC # BLD AUTO: 2.4 M/UL (ref 4.05–5.2)
RBC MORPH BLD: ABNORMAL
SERVICE CMNT-IMP: NORMAL
SERVICE CMNT-IMP: NORMAL
SODIUM SERPL-SCNC: 143 MMOL/L (ref 136–145)
STATUS OF UNIT,%ST: NORMAL
UNIT DIVISION, %UDIV: 0
URATE SERPL-MCNC: 2 MG/DL (ref 2.6–6)
WBC # BLD AUTO: 0.4 K/UL (ref 4.3–11.1)
WBC MORPH BLD: ABNORMAL

## 2019-05-26 PROCEDURE — 36591 DRAW BLOOD OFF VENOUS DEVICE: CPT

## 2019-05-26 PROCEDURE — 83615 LACTATE (LD) (LDH) ENZYME: CPT

## 2019-05-26 PROCEDURE — 65270000029 HC RM PRIVATE

## 2019-05-26 PROCEDURE — 74011000258 HC RX REV CODE- 258: Performed by: INTERNAL MEDICINE

## 2019-05-26 PROCEDURE — 74011250637 HC RX REV CODE- 250/637: Performed by: NURSE PRACTITIONER

## 2019-05-26 PROCEDURE — 74011250637 HC RX REV CODE- 250/637: Performed by: INTERNAL MEDICINE

## 2019-05-26 PROCEDURE — 74011250636 HC RX REV CODE- 250/636: Performed by: INTERNAL MEDICINE

## 2019-05-26 PROCEDURE — 84550 ASSAY OF BLOOD/URIC ACID: CPT

## 2019-05-26 PROCEDURE — 85025 COMPLETE CBC W/AUTO DIFF WBC: CPT

## 2019-05-26 PROCEDURE — 84100 ASSAY OF PHOSPHORUS: CPT

## 2019-05-26 PROCEDURE — 80053 COMPREHEN METABOLIC PANEL: CPT

## 2019-05-26 PROCEDURE — 83735 ASSAY OF MAGNESIUM: CPT

## 2019-05-26 PROCEDURE — 74011000250 HC RX REV CODE- 250: Performed by: NURSE PRACTITIONER

## 2019-05-26 PROCEDURE — 99232 SBSQ HOSP IP/OBS MODERATE 35: CPT | Performed by: INTERNAL MEDICINE

## 2019-05-26 RX ORDER — PRENATAL VIT 91/IRON/FOLIC/DHA 28-975-200
COMBINATION PACKAGE (EA) ORAL 2 TIMES DAILY
Status: COMPLETED | OUTPATIENT
Start: 2019-05-26 | End: 2019-06-04

## 2019-05-26 RX ORDER — HYDROCORTISONE 1 %
CREAM (GRAM) TOPICAL 2 TIMES DAILY
Status: DISCONTINUED | OUTPATIENT
Start: 2019-05-26 | End: 2019-05-26 | Stop reason: SDUPTHER

## 2019-05-26 RX ORDER — ELECTROLYTES/DEXTROSE
SOLUTION, ORAL ORAL 2 TIMES DAILY
Status: DISCONTINUED | OUTPATIENT
Start: 2019-05-26 | End: 2019-06-07 | Stop reason: HOSPADM

## 2019-05-26 RX ADMIN — SUCRALFATE 1 G: 1 TABLET ORAL at 15:42

## 2019-05-26 RX ADMIN — PANTOPRAZOLE SODIUM 40 MG: 40 TABLET, DELAYED RELEASE ORAL at 08:01

## 2019-05-26 RX ADMIN — Medication 10 ML: at 22:31

## 2019-05-26 RX ADMIN — SUCRALFATE 1 G: 1 TABLET ORAL at 08:01

## 2019-05-26 RX ADMIN — Medication 5 ML: at 07:30

## 2019-05-26 RX ADMIN — Medication 500 MG: at 08:01

## 2019-05-26 RX ADMIN — ACYCLOVIR 400 MG: 800 TABLET ORAL at 17:26

## 2019-05-26 RX ADMIN — CHLORHEXIDINE GLUCONATE 15 ML: 1.2 RINSE ORAL at 17:26

## 2019-05-26 RX ADMIN — Medication 5 ML: at 22:35

## 2019-05-26 RX ADMIN — POTASSIUM CHLORIDE 20 MEQ: 20 TABLET, EXTENDED RELEASE ORAL at 08:01

## 2019-05-26 RX ADMIN — Medication 5 ML: at 15:43

## 2019-05-26 RX ADMIN — ESCITALOPRAM OXALATE 10 MG: 10 TABLET ORAL at 08:00

## 2019-05-26 RX ADMIN — ACETAMINOPHEN: 500 TABLET, FILM COATED ORAL at 22:30

## 2019-05-26 RX ADMIN — PRAVASTATIN SODIUM 10 MG: 20 TABLET ORAL at 22:30

## 2019-05-26 RX ADMIN — SUCRALFATE 1 G: 1 TABLET ORAL at 22:30

## 2019-05-26 RX ADMIN — HYDROCORTISONE: 1 CREAM TOPICAL at 22:34

## 2019-05-26 RX ADMIN — ACYCLOVIR 400 MG: 800 TABLET ORAL at 08:00

## 2019-05-26 RX ADMIN — ALLOPURINOL 300 MG: 300 TABLET ORAL at 08:01

## 2019-05-26 RX ADMIN — SUCRALFATE 1 G: 1 TABLET ORAL at 11:49

## 2019-05-26 RX ADMIN — POTASSIUM & SODIUM PHOSPHATES POWDER PACK 280-160-250 MG 1 PACKET: 280-160-250 PACK at 14:00

## 2019-05-26 RX ADMIN — FLUCONAZOLE 200 MG: 100 TABLET ORAL at 08:00

## 2019-05-26 RX ADMIN — TERBINAFINE HYDROCHLORIDE: 1 CREAM TOPICAL at 22:35

## 2019-05-26 RX ADMIN — SODIUM CHLORIDE, PRESERVATIVE FREE 300 UNITS: 5 INJECTION INTRAVENOUS at 22:31

## 2019-05-26 RX ADMIN — Medication 5 ML: at 11:49

## 2019-05-26 RX ADMIN — HYDROCORTISONE: 1 CREAM TOPICAL at 13:25

## 2019-05-26 RX ADMIN — CHLORHEXIDINE GLUCONATE 15 ML: 1.2 RINSE ORAL at 08:00

## 2019-05-26 RX ADMIN — Medication 500 MG: at 11:49

## 2019-05-26 RX ADMIN — Medication 500 MG: at 17:26

## 2019-05-26 RX ADMIN — CEFTRIAXONE SODIUM 2 G: 2 INJECTION, POWDER, FOR SOLUTION INTRAMUSCULAR; INTRAVENOUS at 15:42

## 2019-05-26 RX ADMIN — LORAZEPAM 1 MG: 1 TABLET ORAL at 22:30

## 2019-05-26 RX ADMIN — POTASSIUM & SODIUM PHOSPHATES POWDER PACK 280-160-250 MG 1 PACKET: 280-160-250 PACK at 08:01

## 2019-05-26 RX ADMIN — SODIUM CHLORIDE, PRESERVATIVE FREE 300 UNITS: 5 INJECTION INTRAVENOUS at 05:24

## 2019-05-26 RX ADMIN — POTASSIUM & SODIUM PHOSPHATES POWDER PACK 280-160-250 MG 1 PACKET: 280-160-250 PACK at 22:30

## 2019-05-26 RX ADMIN — TERBINAFINE HYDROCHLORIDE: 1 CREAM TOPICAL at 13:25

## 2019-05-26 NOTE — PROGRESS NOTES
Pt up ad sarai; walked multiple laps on floor. Pt with no complaints. VSS; no fevers. Report given to oncoming RN.

## 2019-05-26 NOTE — PROGRESS NOTES
[facility] Hematology & Oncology Inpatient Hematology / Oncology Progress Note Admission Date: 2019 10:53 AM 
Reason for Admission/Hospital Course: Admission for antineoplastic chemotherapy [Z51.11] 24 Hour Events: VSS/afebrile Day 18 post 7+3 No transfusions today In great spirits Rash to hands and one small area on left thigh ROS: 
Constitutional:  negative for fever, chills, weakness, malaise, fatigue. CV:  negative for chest pain, palpitations, edema. Respiratory: negative for dyspnea, cough, wheezing. GI: negative abdominal pain, diarrhea,  nausea/vomiting. 10 point review of systems is otherwise negative with the exception of the elements mentioned above in the HPI. No Known Allergies OBJECTIVE: 
Patient Vitals for the past 8 hrs: 
 BP Temp Pulse Resp SpO2  
19 0859 151/70 97.6 °F (36.4 °C) 84 18 97 % 19 0525 140/61 98.6 °F (37 °C) (!) 58 16 96 % Temp (24hrs), Av.1 °F (36.7 °C), Min:97.6 °F (36.4 °C), Max:98.6 °F (37 °C) 
 
 0701 -  1900 In: 250 [P.O.:250] Out: - Physical Exam: 
Constitutional: Well developed, well nourished female in no acute distress, sitting comfortably in bedside chair. HEENT: Normocephalic and atraumatic. Oropharynx is clear, mucous membranes are moist. Extraocular muscles are intact. Sclerae anicteric. Skin Warm and dry. No erythema. No pallor. + eczema like rash to hands. Ring worm appearance, small pea-sized area left thigh. Respiratory Lungs are clear to auscultation bilaterally without wheezes, rales or rhonchi, normal air exchange without accessory muscle use. CVS Normal rate, regular rhythm and normal S1 and S2. No murmurs, gallops, or rubs. Abdomen Soft, nontender and nondistended, normoactive bowel sounds. Neuro Grossly nonfocal with no obvious sensory or motor deficits. MSK Normal range of motion in general. BLE edema improved Psych Appropriate mood and affect. Labs: 
   
Recent Labs  
  05/26/19 
0520 05/25/19 
0306 05/24/19 
6680 WBC 0.4* 0.3* 0.3*  
RBC 2.40* 2.39* 2.44* HGB 7.4* 7.4* 7.6* HCT 22.3* 22.1* 22.4* MCV 92.9 92.5 91.8 MCH 30.8 31.0 31.1 MCHC 33.2 33.5 33.9 RDW 14.5 15.0* 15.5* PLT 39* 9* 12* GRANS  --   --  6*  
LYMPH  --   --  91* MONOS  --   --  3* EOS  --   --  0* BASOS  --   --  0 IG  --   --  0  
DF AUTOMATED AUTOMATED AUTOMATED ANEU  --   --  0.0* ABL  --   --  0.3* ABM  --   --  0.0* ALEKSANDR  --   --  0.0 ABB  --   --  0.0 AIG  --   --  0.0 Recent Labs  
  05/26/19 0520 05/25/19 
0306 05/24/19 
0224  142 142  
K 4.1 3.9 3.7 * 107 108* CO2 29 29 28 AGAP 6* 6* 6*  
GLU 86 97 91 BUN 7* 9 8  
CREA 0.55* 0.51* 0.55* GFRAA >60 >60 >60 GFRNA >60 >60 >60  
CA 8.4 8.2* 8.0*  
SGOT 14* 12* 14* AP 67 63 55  
TP 5.8* 5.6* 5.3* ALB 2.9* 2.8* 2.7*  
GLOB 2.9 2.8 2.6 AGRAT 1.0* 1.0* 1.0*  
MG 2.4 2.2 2.3 PHOS 3.3 2.7 2.1* Imaging: XR CHEST SNGL V [[id number]] Collected: 05/18/19 0855 Order Status: Completed Updated: 05/18/19 2033 Narrative:    
EXAM: Single view chest radiograph. INDICATION: 73 years Fever COMPARISON: None. FINDINGS: 
No focal lung opacity. No pneumothorax. No pleural effusion. The heart, 
mediastinum, jhoana, and pulmonary vasculature are within normal limits. No 
evidence of acute osseous abnormality. Right-sided chest port with tip 
terminating in the right atrium. Impression:    
IMPRESSION:  
1. No evidence of pneumonia. Medications: 
Current Facility-Administered Medications Medication Dose Route Frequency  0.9% sodium chloride infusion 250 mL  250 mL IntraVENous PRN  potassium, sodium phosphates (NEUTRA-PHOS) packet 1 Packet  1 Packet Oral TID  simethicone (MYLICON) tablet 80 mg  80 mg Oral QID PRN  
 cefTRIAXone (ROCEPHIN) 2 g in 0.9% sodium chloride (MBP/ADV) 50 mL  2 g IntraVENous Q24H  heparin (porcine) pf 300 Units  300 Units InterCATHeter Q8H  psyllium husk-aspartame (METAMUCIL FIBER) packet 1 Packet  1 Packet Oral BID PRN  
 0.9% sodium chloride infusion 250 mL  250 mL IntraVENous PRN  potassium chloride (K-DUR, KLOR-CON) SR tablet 20 mEq  20 mEq Oral DAILY  chlorhexidine (PERIDEX) 0.12 % mouthwash 15 mL  15 mL Oral BID  midostaurin (RYDAPT) chemo capsule 50 mg (Patient Supplied)  50 mg Oral Q12H  
 sucralfate (CARAFATE) tablet 1 g  1 g Oral AC&HS  pantoprazole (PROTONIX) tablet 40 mg  40 mg Oral ACB  alum-mag hydroxide-simeth (MYLANTA) oral suspension 30 mL  30 mL Oral Q4H PRN  
 magic mouthwash (RENÉ) oral suspension 5 mL  5 mL Oral AC&HS  morphine injection 2 mg  2 mg IntraVENous Q4H PRN  
 HYDROcodone-acetaminophen (NORCO) 5-325 mg per tablet 1 Tab  1 Tab Oral Q6H PRN  psyllium husk-aspartame (METAMUCIL FIBER) packet 1 Packet  1 Packet Oral BID PRN  
 central line flush (saline) syringe 10 mL  10 mL InterCATHeter PRN  
 0.9% sodium chloride infusion 250 mL  250 mL IntraVENous PRN  polyethylene glycol (MIRALAX) packet 17 g  17 g Oral DAILY PRN  
 LORazepam (ATIVAN) injection 0.5 mg  0.5 mg IntraVENous Q6H PRN  
 acetaminophen (TYLENOL) tablet 650 mg  650 mg Oral PRN  
 diphenhydrAMINE (BENADRYL) capsule 25 mg  25 mg Oral PRN  
 acetaminophen/diphenhydrAMINE (TYLENOL PM EXT STR) 500/25 mg   Oral QHS  acyclovir (ZOVIRAX) tablet 400 mg  400 mg Oral BID  allopurinol (ZYLOPRIM) tablet 300 mg  300 mg Oral DAILY  calcium carbonate (OS-CHRIS) tablet 500 mg [elemental]  500 mg Oral TID WITH MEALS  escitalopram oxalate (LEXAPRO) tablet 10 mg  10 mg Oral DAILY  fluconazole (DIFLUCAN) tablet 200 mg  200 mg Oral DAILY  lidocaine-prilocaine (EMLA) 2.5-2.5 % cream   Topical PRN  
 LORazepam (ATIVAN) tablet 1 mg  1 mg Oral QHS  ondansetron (ZOFRAN ODT) tablet 8 mg  8 mg Oral Q8H PRN  pravastatin (PRAVACHOL) tablet 10 mg  10 mg Oral QHS  vit C-E-zinc cit-lutein-zeaxan 50 mg-15 unit- 4.5 mg-2.5 mg chew (OCU-ABDOUL) 1 Tab (Patient Supplied)  1 Tab Oral DAILY  ergocalciferol capsule 50,000 Units  50,000 Units Oral every Wednesday ASSESSMENT: 
 
Problem List  Date Reviewed: 4/30/2019 Codes Class Noted Acute myeloid leukemia not having achieved remission ([facility] ICD-10-CM: C92.00 ICD-9-CM: 205.00  5/9/2019 Admission for antineoplastic chemotherapy ICD-10-CM: Z51.11 ICD-9-CM: V58.11  5/5/2019 AML (acute myeloblastic leukemia) ([facility] ICD-10-CM: C92.00 ICD-9-CM: 205.00  4/28/2019 Weakness generalized ICD-10-CM: R53.1 ICD-9-CM: 780.79  4/28/2019 Pancytopenia ([facility] ICD-10-CM: J94.790 ICD-9-CM: 284.19  4/28/2019 Thrombocytopenia ([facility] ICD-10-CM: D69.6 ICD-9-CM: 287.5  4/27/2019 PLAN: 
AML FLT 3+ 
5/6  BMbx planned for Tuesday then wait for [facility] from port results to determine start date of  cycle one 7 + 3 with Midostaurin day 8-21. Platelets will need to be at least 50k for bx tomorrow 5/7. BMbx today-platelets prior. Also needed prbc today for hgb 6.9. 
5/08 Start 7+3 when afebrile per Dr. Duong Fail. 5/09 Day 1 of 7+3. Monitor TLS labs. 5/10 Day 2 7+3. Tolerating well. 5/13 Day 5 7+3. Midostaurin on the way. 5/16 Day 8 7+3. Day one Rydapt. 5/19 Day 11 7+3, day 4 Rydapt. Pancytopenia related to chemotherapy 5/16 transfuse per Alexander SOPs 
5/25 No transfusions needed today.  
  
Possible port infection 5/5 Day one vanc/cefe. Follow cultures. Do not use port. May need to have Echo if cultures positive. 5/6 BCNTD. Appears improved today. Continue to monitor. No fevers or other symptoms. 5/7 Day 3 vanc/cefe. Site appears even better today. 5/8 Does not appear infected. Site bruised. 5/9 BCx negative. Per ID okay to start treatment. 5/13 Completed 7 days Vanc/cef. Now on levaquin per ID. 5/20 BC from port +GPC alpha strep-ID consulted 5/21 BC repeated today. ID following for final read on previous cultures to determine if port needs to be removed. ECHO pending.  
5/22 Echo with no vegetation. BC + strep parasanguinis-ID following. Repeat BC NTD 
5/23 ID changed abx to Rocephin x 2 weeks EOT 6/15/19 then repeat BC x 2 post 7 days tx. No port removal needed. Neutropenic Fever 05/05 Pan-culture negative. On Vancomycin, Maxipime 05/08 Febrile overnight up to 102.5. Repeat cultures x 1. Continue antibiotics. Likely disease related. Consult ID for clearance. 05/09 BC remain negative. No fevers 48 hours. Continue vanc/cefe. 05/13 now only on LVQ per ID. 5/18 Neutropenic fever - 100.5. Pan-cultures obtained. Chest xray negative. Started on Vanc/Cef. 5/19 Afebrile. Port culture with gram positive cocci in chains, await further studies. Continue current antibiotics. 5/20 port +BC with GPC alpha strep-ID consulted 5/23 ID changed abx to Rocephin x 2 weeks EOT 6/15/19 then repeat BC x 2 post 7 days tx. No port removal needed. Bradycardia 5/14 EKG with no significant change. Skin Changes 05/26 Hydrocortisone to hands. Anti-fungal cream to area of ring worm. [facility] SOPs Supportive care ACV/Diflucan 
LVQ dc'd while on cefe and vanc Patient is worried about having repeat BMbx due to bad experience with first one at [facility]/ We will try to arrange for BMbx with sedation in IR when appropriate. Goals and plan of care reviewed with the patient. All questions answered to the best of our ability. ARMANDO [facility] [id number] [place] [address], [address] [place] [address] Office : [phone] Fax : [phone]

## 2019-05-26 NOTE — PROGRESS NOTES
END OF SHIFT NOTE: 7p ~ 7a Intake/Output 24 I&O = -1620 Voiding: YES Stool:  0 occurrences. Stool Assessment Stool Color: Brown(per pt) (05/24/19 3159) Stool Appearance: Soft;Loose(per pt) (05/24/19 2652) Stool Amount: Medium(per pt) (05/24/19 5170) Stool Source/Status: Rectum (05/24/19 5603) Emesis:  0 occurrences. Emesis Assessment Appearance: Undigested food (05/18/19 0258) Emesis Amount: Small (05/18/19 0258) VITAL SIGNS Patient Vitals for the past 12 hrs: 
 Temp Pulse Resp BP SpO2  
05/26/19 0525 98.6 °F (37 °C) (!) 58 16 140/61 96 % 05/25/19 2305 98 °F (36.7 °C) 75 18 146/68 96 % 05/25/19 2026 98.2 °F (36.8 °C) 74 18 149/63 98 % Pain Assessment Pain 1 Pain Scale 1: Numeric (0 - 10) (05/26/19 0525) Pain Intensity 1: 0 (05/26/19 0525) Patient Stated Pain Goal: 0 (05/26/19 0525) Pain Reassessment 1: Patient sleeping (05/24/19 1512) Pain Onset 1: now (05/17/19 1600) Pain Location 1: Chest (05/17/19 1600) Pain Orientation 1: Mid (05/17/19 1600) Pain Description 1: Sore (05/17/19 1600) Pain Intervention(s) 1: Medication (see MAR) (05/17/19 1600) Ambulating Yes Additional Information: No c/o voiced, generalized weakness. Remains afebrile. Continuing with current POC. Shift report given to Valerie Argueta RN at the bedside.  
 
Mir Marcos RN

## 2019-05-27 LAB
ALBUMIN SERPL-MCNC: 3 G/DL (ref 3.2–4.6)
ALBUMIN/GLOB SERPL: 1 {RATIO} (ref 1.2–3.5)
ALP SERPL-CCNC: 75 U/L (ref 50–136)
ALT SERPL-CCNC: 23 U/L (ref 12–65)
ANION GAP SERPL CALC-SCNC: 5 MMOL/L (ref 7–16)
AST SERPL-CCNC: 14 U/L (ref 15–37)
BILIRUB SERPL-MCNC: 0.4 MG/DL (ref 0.2–1.1)
BUN SERPL-MCNC: 8 MG/DL (ref 8–23)
CALCIUM SERPL-MCNC: 8.4 MG/DL (ref 8.3–10.4)
CHLORIDE SERPL-SCNC: 107 MMOL/L (ref 98–107)
CO2 SERPL-SCNC: 30 MMOL/L (ref 21–32)
CREAT SERPL-MCNC: 0.57 MG/DL (ref 0.6–1)
DIFFERENTIAL METHOD BLD: ABNORMAL
ERYTHROCYTE [DISTWIDTH] IN BLOOD BY AUTOMATED COUNT: 14.2 % (ref 11.9–14.6)
GLOBULIN SER CALC-MCNC: 3 G/DL (ref 2.3–3.5)
GLUCOSE SERPL-MCNC: 95 MG/DL (ref 65–100)
HCT VFR BLD AUTO: 22 % (ref 35.8–46.3)
HGB BLD-MCNC: 7.2 G/DL (ref 11.7–15.4)
LDH SERPL L TO P-CCNC: 171 U/L (ref 110–210)
MAGNESIUM SERPL-MCNC: 2.4 MG/DL (ref 1.8–2.4)
MCH RBC QN AUTO: 30.4 PG (ref 26.1–32.9)
MCHC RBC AUTO-ENTMCNC: 32.7 G/DL (ref 31.4–35)
MCV RBC AUTO: 92.8 FL (ref 79.6–97.8)
NRBC # BLD: 0 K/UL (ref 0–0.2)
PHOSPHATE SERPL-MCNC: 3.6 MG/DL (ref 2.3–3.7)
PLATELET # BLD AUTO: 28 K/UL (ref 150–450)
PLATELET COMMENTS,PCOM: ABNORMAL
PMV BLD AUTO: 11.2 FL (ref 9.4–12.3)
POTASSIUM SERPL-SCNC: 3.9 MMOL/L (ref 3.5–5.1)
PROT SERPL-MCNC: 6 G/DL (ref 6.3–8.2)
RBC # BLD AUTO: 2.37 M/UL (ref 4.05–5.2)
RBC MORPH BLD: ABNORMAL
RBC MORPH BLD: ABNORMAL
SODIUM SERPL-SCNC: 142 MMOL/L (ref 136–145)
URATE SERPL-MCNC: 2 MG/DL (ref 2.6–6)
WBC # BLD AUTO: 0.2 K/UL (ref 4.3–11.1)
WBC MORPH BLD: ABNORMAL

## 2019-05-27 PROCEDURE — 74011250636 HC RX REV CODE- 250/636: Performed by: INTERNAL MEDICINE

## 2019-05-27 PROCEDURE — 84100 ASSAY OF PHOSPHORUS: CPT

## 2019-05-27 PROCEDURE — 83735 ASSAY OF MAGNESIUM: CPT

## 2019-05-27 PROCEDURE — 80053 COMPREHEN METABOLIC PANEL: CPT

## 2019-05-27 PROCEDURE — 74011000258 HC RX REV CODE- 258: Performed by: INTERNAL MEDICINE

## 2019-05-27 PROCEDURE — 84550 ASSAY OF BLOOD/URIC ACID: CPT

## 2019-05-27 PROCEDURE — 99232 SBSQ HOSP IP/OBS MODERATE 35: CPT | Performed by: INTERNAL MEDICINE

## 2019-05-27 PROCEDURE — 83615 LACTATE (LD) (LDH) ENZYME: CPT

## 2019-05-27 PROCEDURE — 74011000250 HC RX REV CODE- 250: Performed by: NURSE PRACTITIONER

## 2019-05-27 PROCEDURE — 65270000029 HC RM PRIVATE

## 2019-05-27 PROCEDURE — 36591 DRAW BLOOD OFF VENOUS DEVICE: CPT

## 2019-05-27 PROCEDURE — 74011250637 HC RX REV CODE- 250/637: Performed by: INTERNAL MEDICINE

## 2019-05-27 PROCEDURE — 74011250637 HC RX REV CODE- 250/637: Performed by: NURSE PRACTITIONER

## 2019-05-27 PROCEDURE — 85025 COMPLETE CBC W/AUTO DIFF WBC: CPT

## 2019-05-27 RX ADMIN — Medication 5 ML: at 11:30

## 2019-05-27 RX ADMIN — CHLORHEXIDINE GLUCONATE 15 ML: 1.2 RINSE ORAL at 17:54

## 2019-05-27 RX ADMIN — CEFTRIAXONE SODIUM 2 G: 2 INJECTION, POWDER, FOR SOLUTION INTRAMUSCULAR; INTRAVENOUS at 15:08

## 2019-05-27 RX ADMIN — Medication 5 ML: at 16:30

## 2019-05-27 RX ADMIN — ACYCLOVIR 400 MG: 800 TABLET ORAL at 17:54

## 2019-05-27 RX ADMIN — Medication 5 ML: at 22:00

## 2019-05-27 RX ADMIN — LORAZEPAM 1 MG: 1 TABLET ORAL at 22:04

## 2019-05-27 RX ADMIN — SODIUM CHLORIDE, PRESERVATIVE FREE 300 UNITS: 5 INJECTION INTRAVENOUS at 15:08

## 2019-05-27 RX ADMIN — HYDROCORTISONE: 1 CREAM TOPICAL at 19:07

## 2019-05-27 RX ADMIN — PANTOPRAZOLE SODIUM 40 MG: 40 TABLET, DELAYED RELEASE ORAL at 08:07

## 2019-05-27 RX ADMIN — ESCITALOPRAM OXALATE 10 MG: 10 TABLET ORAL at 08:07

## 2019-05-27 RX ADMIN — SUCRALFATE 1 G: 1 TABLET ORAL at 21:14

## 2019-05-27 RX ADMIN — SUCRALFATE 1 G: 1 TABLET ORAL at 08:07

## 2019-05-27 RX ADMIN — CHLORHEXIDINE GLUCONATE 15 ML: 1.2 RINSE ORAL at 08:07

## 2019-05-27 RX ADMIN — Medication 500 MG: at 12:15

## 2019-05-27 RX ADMIN — SUCRALFATE 1 G: 1 TABLET ORAL at 15:17

## 2019-05-27 RX ADMIN — TERBINAFINE HYDROCHLORIDE: 1 CREAM TOPICAL at 10:48

## 2019-05-27 RX ADMIN — Medication 5 ML: at 07:45

## 2019-05-27 RX ADMIN — SUCRALFATE 1 G: 1 TABLET ORAL at 12:15

## 2019-05-27 RX ADMIN — Medication 500 MG: at 08:08

## 2019-05-27 RX ADMIN — SODIUM CHLORIDE, PRESERVATIVE FREE 300 UNITS: 5 INJECTION INTRAVENOUS at 21:14

## 2019-05-27 RX ADMIN — PRAVASTATIN SODIUM 10 MG: 20 TABLET ORAL at 22:04

## 2019-05-27 RX ADMIN — ALLOPURINOL 300 MG: 300 TABLET ORAL at 08:07

## 2019-05-27 RX ADMIN — Medication 500 MG: at 15:08

## 2019-05-27 RX ADMIN — HYDROCORTISONE: 1 CREAM TOPICAL at 10:48

## 2019-05-27 RX ADMIN — POTASSIUM CHLORIDE 20 MEQ: 20 TABLET, EXTENDED RELEASE ORAL at 08:07

## 2019-05-27 RX ADMIN — ACETAMINOPHEN: 500 TABLET, FILM COATED ORAL at 22:04

## 2019-05-27 RX ADMIN — ACYCLOVIR 400 MG: 800 TABLET ORAL at 08:07

## 2019-05-27 RX ADMIN — SODIUM CHLORIDE, PRESERVATIVE FREE 300 UNITS: 5 INJECTION INTRAVENOUS at 06:29

## 2019-05-27 RX ADMIN — FLUCONAZOLE 200 MG: 100 TABLET ORAL at 08:07

## 2019-05-27 RX ADMIN — TERBINAFINE HYDROCHLORIDE: 1 CREAM TOPICAL at 19:07

## 2019-05-27 NOTE — PROGRESS NOTES
[facility] Hematology & Oncology Inpatient Hematology / Oncology Progress Note Admission Date: 2019 10:53 AM 
Reason for Admission/Hospital Course: Admission for antineoplastic chemotherapy [Z51.11] 24 Hour Events: VSS/afebrile Day 19 post 7+3 No transfusions today In great spirits ROS: 
Constitutional:  negative for fever, chills, weakness, malaise, fatigue. CV:  negative for chest pain, palpitations, edema. Respiratory: negative for dyspnea, cough, wheezing. GI: negative abdominal pain, diarrhea,  nausea/vomiting. 10 point review of systems is otherwise negative with the exception of the elements mentioned above in the HPI. No Known Allergies OBJECTIVE: 
Patient Vitals for the past 8 hrs: 
 BP Temp Pulse Resp SpO2  
19 0720 127/62 97.4 °F (36.3 °C) 86 20 95 % 19 0311 153/63 98 °F (36.7 °C) 67 18 98 % Temp (24hrs), Av.9 °F (36.6 °C), Min:97.4 °F (36.3 °C), Max:98.3 °F (36.8 °C) 
 
 0701 -  1900 In: 6129 [P.O.:490; I.V.:801] Out: - Physical Exam: 
Constitutional: Well developed, well nourished female in no acute distress, sitting comfortably in bedside chair. HEENT: Normocephalic and atraumatic. Oropharynx is clear, mucous membranes are moist. Extraocular muscles are intact. Sclerae anicteric. Skin Warm and dry. No erythema. No pallor. + eczema like rash to hands. Ring worm appearance, small pea-sized area left thigh. Respiratory Lungs are clear to auscultation bilaterally without wheezes, rales or rhonchi, normal air exchange without accessory muscle use. CVS Normal rate, regular rhythm and normal S1 and S2. No murmurs, gallops, or rubs. Abdomen Soft, nontender and nondistended, normoactive bowel sounds. Neuro Grossly nonfocal with no obvious sensory or motor deficits. MSK Normal range of motion in general. BLE edema improved Psych Appropriate mood and affect. Labs: 
   
Recent Labs  
  19 9987 05/26/19 
3656 05/25/19 
1606 WBC 0.2* 0.4* 0.3*  
RBC 2.37* 2.40* 2.39* HGB 7.2* 7.4* 7.4* HCT 22.0* 22.3* 22.1*  
MCV 92.8 92.9 92.5 MCH 30.4 30.8 31.0 MCHC 32.7 33.2 33.5  
RDW 14.2 14.5 15.0*  
PLT 28* 39* 9*  
DF MANUAL AUTOMATED AUTOMATED Recent Labs  
  05/27/19 
0440 05/26/19 
0520 05/25/19 
1779  143 142  
K 3.9 4.1 3.9  108* 107 CO2 30 29 29 AGAP 5* 6* 6*  
GLU 95 86 97 BUN 8 7* 9  
CREA 0.57* 0.55* 0.51* GFRAA >60 >60 >60 GFRNA >60 >60 >60  
CA 8.4 8.4 8.2* SGOT 14* 14* 12* AP 75 67 63  
TP 6.0* 5.8* 5.6* ALB 3.0* 2.9* 2.8*  
GLOB 3.0 2.9 2.8 AGRAT 1.0* 1.0* 1.0*  
MG 2.4 2.4 2.2 PHOS 3.6 3.3 2.7 Imaging: XR CHEST SNGL V [[id number]] Collected: 05/18/19 0855 Order Status: Completed Updated: 05/18/19 4145 Narrative:    
EXAM: Single view chest radiograph. INDICATION: 73 years Fever COMPARISON: None. FINDINGS: 
No focal lung opacity. No pneumothorax. No pleural effusion. The heart, 
mediastinum, jhoana, and pulmonary vasculature are within normal limits. No 
evidence of acute osseous abnormality. Right-sided chest port with tip 
terminating in the right atrium. Impression:    
IMPRESSION:  
1. No evidence of pneumonia. Medications: 
Current Facility-Administered Medications Medication Dose Route Frequency  terbinafine HCl (LAMISIL) 1 % cream   Topical BID  hydrocortisone-aloe vera 1 % topical cream   Topical BID  
 0.9% sodium chloride infusion 250 mL  250 mL IntraVENous PRN  
 simethicone (MYLICON) tablet 80 mg  80 mg Oral QID PRN  
 cefTRIAXone (ROCEPHIN) 2 g in 0.9% sodium chloride (MBP/ADV) 50 mL  2 g IntraVENous Q24H  
 heparin (porcine) pf 300 Units  300 Units InterCATHeter Q8H  psyllium husk-aspartame (METAMUCIL FIBER) packet 1 Packet  1 Packet Oral BID PRN  
 0.9% sodium chloride infusion 250 mL  250 mL IntraVENous PRN  potassium chloride (K-DUR, KLOR-CON) SR tablet 20 mEq  20 mEq Oral DAILY  chlorhexidine (PERIDEX) 0.12 % mouthwash 15 mL  15 mL Oral BID  midostaurin (RYDAPT) chemo capsule 50 mg (Patient Supplied)  50 mg Oral Q12H  
 sucralfate (CARAFATE) tablet 1 g  1 g Oral AC&HS  pantoprazole (PROTONIX) tablet 40 mg  40 mg Oral ACB  alum-mag hydroxide-simeth (MYLANTA) oral suspension 30 mL  30 mL Oral Q4H PRN  
 magic mouthwash (RENÉ) oral suspension 5 mL  5 mL Oral AC&HS  morphine injection 2 mg  2 mg IntraVENous Q4H PRN  
 HYDROcodone-acetaminophen (NORCO) 5-325 mg per tablet 1 Tab  1 Tab Oral Q6H PRN  psyllium husk-aspartame (METAMUCIL FIBER) packet 1 Packet  1 Packet Oral BID PRN  
 central line flush (saline) syringe 10 mL  10 mL InterCATHeter PRN  
 0.9% sodium chloride infusion 250 mL  250 mL IntraVENous PRN  polyethylene glycol (MIRALAX) packet 17 g  17 g Oral DAILY PRN  
 LORazepam (ATIVAN) injection 0.5 mg  0.5 mg IntraVENous Q6H PRN  
 acetaminophen (TYLENOL) tablet 650 mg  650 mg Oral PRN  
 diphenhydrAMINE (BENADRYL) capsule 25 mg  25 mg Oral PRN  
 acetaminophen/diphenhydrAMINE (TYLENOL PM EXT STR) 500/25 mg   Oral QHS  acyclovir (ZOVIRAX) tablet 400 mg  400 mg Oral BID  allopurinol (ZYLOPRIM) tablet 300 mg  300 mg Oral DAILY  calcium carbonate (OS-CHRIS) tablet 500 mg [elemental]  500 mg Oral TID WITH MEALS  escitalopram oxalate (LEXAPRO) tablet 10 mg  10 mg Oral DAILY  fluconazole (DIFLUCAN) tablet 200 mg  200 mg Oral DAILY  lidocaine-prilocaine (EMLA) 2.5-2.5 % cream   Topical PRN  
 LORazepam (ATIVAN) tablet 1 mg  1 mg Oral QHS  ondansetron (ZOFRAN ODT) tablet 8 mg  8 mg Oral Q8H PRN  pravastatin (PRAVACHOL) tablet 10 mg  10 mg Oral QHS  vit C-E-zinc cit-lutein-zeaxan 50 mg-15 unit- 4.5 mg-2.5 mg chew (OCU-ABDOUL) 1 Tab (Patient Supplied)  1 Tab Oral DAILY  ergocalciferol capsule 50,000 Units  50,000 Units Oral every Wednesday ASSESSMENT: 
 
Problem List  Date Reviewed: 4/30/2019 Codes Class Noted Acute myeloid leukemia not having achieved remission ([facility].) ICD-10-CM: C92.00 ICD-9-CM: 205.00  5/9/2019 Admission for antineoplastic chemotherapy ICD-10-CM: Z51.11 ICD-9-CM: V58.11  5/5/2019 AML (acute myeloblastic leukemia) ([facility].) ICD-10-CM: C92.00 ICD-9-CM: 205.00  4/28/2019 Weakness generalized ICD-10-CM: R53.1 ICD-9-CM: 780.79  4/28/2019 Pancytopenia ([facility] ICD-10-CM: N10.831 ICD-9-CM: 284.19  4/28/2019 Thrombocytopenia ([facility] ICD-10-CM: D69.6 ICD-9-CM: 287.5  4/27/2019 PLAN: 
AML FLT 3+ 
5/6  BMbx planned for Tuesday then wait for [facility] from port results to determine start date of  cycle one 7 + 3 with Midostaurin day 8-21. Platelets will need to be at least 50k for bx tomorrow 5/7. BMbx today-platelets prior. Also needed prbc today for hgb 6.9. 
5/08 Start 7+3 when afebrile per Dr. Fiore Noss. 5/09 Day 1 of 7+3. Monitor TLS labs. 5/10 Day 2 7+3. Tolerating well. 5/13 Day 5 7+3. Midostaurin on the way. 5/16 Day 8 7+3. Day one Rydapt. 5/19 Day 11 7+3, day 4 Rydapt. Pancytopenia related to chemotherapy 5/16 transfuse per Alexander SOPs 
5/25 No transfusions needed today.  
  
Possible port infection 5/5 Day one vanc/cefe. Follow cultures. Do not use port. May need to have Echo if cultures positive. 5/6 BCNTD. Appears improved today. Continue to monitor. No fevers or other symptoms. 5/7 Day 3 vanc/cefe. Site appears even better today. 5/8 Does not appear infected. Site bruised. 5/9 BCx negative. Per ID okay to start treatment. 5/13 Completed 7 days Vanc/cef. Now on levaquin per ID. 5/20 BC from port +GPC alpha strep-ID consulted 5/21 BC repeated today. ID following for final read on previous cultures to determine if port needs to be removed. ECHO pending.  
5/22 Echo with no vegetation. BC + strep parasanguinis-ID following.  Repeat BC NTD 
5/23 ID changed abx to Rocephin x 2 weeks EOT 6/15/19 then repeat BC x 2 post 7 days tx. No port removal needed. Neutropenic Fever 05/05 Pan-culture negative. On Vancomycin, Maxipime 05/08 Febrile overnight up to 102.5. Repeat cultures x 1. Continue antibiotics. Likely disease related. Consult ID for clearance. 05/09 BC remain negative. No fevers 48 hours. Continue vanc/cefe. 05/13 now only on LVQ per ID. 5/18 Neutropenic fever - 100.5. Pan-cultures obtained. Chest xray negative. Started on Vanc/Cef. 5/19 Afebrile. Port culture with gram positive cocci in chains, await further studies. Continue current antibiotics. 5/20 port +BC with GPC alpha strep-ID consulted 5/23 ID changed abx to Rocephin x 2 weeks EOT 6/15/19 then repeat BC x 2 post 7 days tx. No port removal needed. Bradycardia 5/14 EKG with no significant change. Skin Changes 05/26 Hydrocortisone to hands. Anti-fungal cream to area of ring worm. [facility] SOPs Supportive care ACV/Diflucan 
LVQ dc'd while on cefe and vanc Patient is worried about having repeat BMbx due to bad experience with first one at [facility]/ We will try to arrange for BMbx with sedation in IR when appropriate. Goals and plan of care reviewed with the patient. All questions answered to the best of our ability. Trudell Claude, NP [address] Office : [phone] Fax : [phone]

## 2019-05-27 NOTE — PROGRESS NOTES
END OF SHIFT NOTE: 
 
Intake/Output 05/27 0701 - 05/27 1900 In: 9659 [P.O.:1445; I.V.:801] Out: 1600 [Urine:1600] Voiding: YES Catheter: NO 
Drain:   
 
 
 
 
 
Stool:  1 occurrences. Stool Assessment Stool Color: Brown(per pt) (05/24/19 8187) Stool Appearance: Soft;Loose(per pt) (05/27/19 4398) Stool Amount: Medium(per pt) (05/24/19 5364) Stool Source/Status: Rectum (05/24/19 5346) Emesis:  0 occurrences. Emesis Assessment Appearance: Undigested food (05/18/19 0258) Emesis Amount: Small (05/18/19 0258) VITAL SIGNS Patient Vitals for the past 12 hrs: 
 Temp Pulse Resp BP SpO2  
05/27/19 1604 98.4 °F (36.9 °C) 72 14 147/63 97 % 05/27/19 1050 97.5 °F (36.4 °C) 72 14 140/61 98 % 05/27/19 0720 97.4 °F (36.3 °C) 86 20 127/62 95 % Pain Assessment Pain 1 Pain Scale 1: Numeric (0 - 10) (05/27/19 0311) Pain Intensity 1: 0 (05/27/19 0311) Patient Stated Pain Goal: 0 (05/27/19 0311) Pain Reassessment 1: Patient sleeping (05/24/19 1512) Pain Onset 1: now (05/17/19 1600) Pain Location 1: Chest (05/17/19 1600) Pain Orientation 1: Mid (05/17/19 1600) Pain Description 1: Sore (05/17/19 1600) Pain Intervention(s) 1: Medication (see MAR) (05/17/19 1600) Ambulating Yes Additional Information: Pt resting in bed with  at bedside. Pt ambulated hallways during shift. Pt had good PO intake with no other issues. PO Rydapt given, and tolerated well. No other needs at this time. Shift report given to oncoming nurse at the bedside. Laurie Rosa

## 2019-05-27 NOTE — PROGRESS NOTES
END OF SHIFT NOTE: 7p ~ 7a Intake/Output 24 I&O = -625 Voiding: YES Stool:  0 occurrences. Stool Assessment Stool Color: Brown(per pt) (05/24/19 4383) Stool Appearance: Soft;Loose(per pt) (05/24/19 0832) Stool Amount: Medium(per pt) (05/24/19 8110) Stool Source/Status: Rectum (05/24/19 6078) Emesis:  0 occurrences. Emesis Assessment Appearance: Undigested food (05/18/19 0258) Emesis Amount: Small (05/18/19 0258) VITAL SIGNS Patient Vitals for the past 12 hrs: 
 Temp Pulse Resp BP SpO2  
05/27/19 0311 98 °F (36.7 °C) 67 18 153/63 98 % 05/26/19 2220 97.8 °F (36.6 °C) 76 18 149/62 98 % Pain Assessment Pain 1 Pain Scale 1: Numeric (0 - 10) (05/27/19 0311) Pain Intensity 1: 0 (05/27/19 0311) Patient Stated Pain Goal: 0 (05/27/19 0311) Pain Reassessment 1: Patient sleeping (05/24/19 1512) Pain Onset 1: now (05/17/19 1600) Pain Location 1: Chest (05/17/19 1600) Pain Orientation 1: Mid (05/17/19 1600) Pain Description 1: Sore (05/17/19 1600) Pain Intervention(s) 1: Medication (see MAR) (05/17/19 1600) Ambulating Yes Additional Information: Pt progressing well with no c/o voiced. Afebrile. Appetite good. Continuing with current POC. Shift report given to oncoming nurse at the bedside.  
 
Mir Marcos, RN

## 2019-05-27 NOTE — PROGRESS NOTES
Nutrition Follow Up Assessment Diet: DIET REGULAR Food,Nutrition, and Pertinent History: Patient reports her appetite is back to normal and asked for the Ensure HP be discontinued. She is enjoying meals and believes she has no barriers to her meal intakes at this time. Anthropometrics: Height: 5' 6\" (167.6 cm), Weight Source: Standing scale (comment), Weight: 87.4 kg (192 lb 9.6 oz). Macronutrient Needs: 
· EER:  3907-6830 kcal /day (15-20 kcal/kg listed BW) · EPR:   grams protein/day (1-1.2 grams/kg listed BW) Intake/Comparative Standards:Average intake for past 4 days: 100%. This potentially meets 100% of kcal and 1000% of protein needs. Intervention:  
Meals and snacks: Continue current diet. Oral Nutrition Supplements: Ensure High Protein is discontinued Discharge Nutrition Plan: No discharge needs at this time. Mary Ann Moise, 66 [address], [address]

## 2019-05-27 NOTE — PROGRESS NOTES
Problem: Falls - Risk of 
Goal: *Absence of Falls Description Document Guadalupe Khan Fall Risk and appropriate interventions in the flowsheet. Outcome: Progressing Towards Goal 
  
Problem: Discharge Planning Goal: *Discharge to safe environment Outcome: Progressing Towards Goal 
Goal: *Knowledge of medication management Outcome: Progressing Towards Goal 
Goal: *Knowledge of discharge instructions Outcome: Progressing Towards Goal 
  
Problem: Anemia Care Plan (Adult and Pediatric) Goal: *Labs within defined limits Outcome: Progressing Towards Goal 
Goal: *Tolerates increased activity Outcome: Progressing Towards Goal 
  
Problem: Pain Goal: *Control of Pain Outcome: Progressing Towards Goal 
  
Problem: Nutrition Deficit Goal: *Optimize nutritional status Outcome: Progressing Towards Goal 
  
Problem: Chemotherapy, Day 3 to Discharge Goal: Off Pathway (Use only if patient is Off Pathway) Outcome: Progressing Towards Goal 
Goal: Activity/Safety Outcome: Progressing Towards Goal 
Goal: Consults, if ordered Outcome: Progressing Towards Goal 
Goal: Diagnostic Test/Procedures Outcome: Progressing Towards Goal 
Goal: Nutrition/Diet Outcome: Progressing Towards Goal 
Goal: Discharge Planning Outcome: Progressing Towards Goal 
Goal: Medications Outcome: Progressing Towards Goal 
Goal: Respiratory Outcome: Progressing Towards Goal 
Goal: Treatments/Interventions/Procedures Outcome: Progressing Towards Goal 
Goal: Psychosocial 
Outcome: Progressing Towards Goal 
Goal: *Optimal pain control at patient's stated goal 
Outcome: Progressing Towards Goal 
Goal: *Hemodynamically stable Outcome: Progressing Towards Goal 
Goal: *Adequate oxygenation Outcome: Progressing Towards Goal

## 2019-05-28 LAB
ALBUMIN SERPL-MCNC: 3.1 G/DL (ref 3.2–4.6)
ALBUMIN/GLOB SERPL: 1.1 {RATIO} (ref 1.2–3.5)
ALP SERPL-CCNC: 75 U/L (ref 50–136)
ALT SERPL-CCNC: 25 U/L (ref 12–65)
ANION GAP SERPL CALC-SCNC: 6 MMOL/L (ref 7–16)
AST SERPL-CCNC: 13 U/L (ref 15–37)
BASOPHILS # BLD: 0 K/UL (ref 0–0.2)
BASOPHILS NFR BLD: 0 % (ref 0–2)
BILIRUB SERPL-MCNC: 0.3 MG/DL (ref 0.2–1.1)
BUN SERPL-MCNC: 10 MG/DL (ref 8–23)
CALCIUM SERPL-MCNC: 8.2 MG/DL (ref 8.3–10.4)
CHLORIDE SERPL-SCNC: 108 MMOL/L (ref 98–107)
CO2 SERPL-SCNC: 29 MMOL/L (ref 21–32)
CREAT SERPL-MCNC: 0.56 MG/DL (ref 0.6–1)
DIFFERENTIAL METHOD BLD: ABNORMAL
EOSINOPHIL # BLD: 0 K/UL (ref 0–0.8)
EOSINOPHIL NFR BLD: 0 % (ref 0.5–7.8)
ERYTHROCYTE [DISTWIDTH] IN BLOOD BY AUTOMATED COUNT: 13.8 % (ref 11.9–14.6)
GLOBULIN SER CALC-MCNC: 2.7 G/DL (ref 2.3–3.5)
GLUCOSE SERPL-MCNC: 91 MG/DL (ref 65–100)
HCT VFR BLD AUTO: 21.3 % (ref 35.8–46.3)
HGB BLD-MCNC: 7 G/DL (ref 11.7–15.4)
IMM GRANULOCYTES # BLD AUTO: 0 K/UL (ref 0–0.5)
IMM GRANULOCYTES NFR BLD AUTO: 0 % (ref 0–5)
LDH SERPL L TO P-CCNC: 168 U/L (ref 110–210)
LYMPHOCYTES # BLD: 0.2 K/UL (ref 0.5–4.6)
LYMPHOCYTES NFR BLD: 96 % (ref 13–44)
MAGNESIUM SERPL-MCNC: 2.4 MG/DL (ref 1.8–2.4)
MCH RBC QN AUTO: 29.9 PG (ref 26.1–32.9)
MCHC RBC AUTO-ENTMCNC: 32.9 G/DL (ref 31.4–35)
MCV RBC AUTO: 91 FL (ref 79.6–97.8)
MONOCYTES # BLD: 0 K/UL (ref 0.1–1.3)
MONOCYTES NFR BLD: 0 % (ref 4–12)
NEUTS SEG # BLD: 0 K/UL (ref 1.7–8.2)
NEUTS SEG NFR BLD: 4 % (ref 43–78)
NRBC # BLD: 0 K/UL (ref 0–0.2)
PHOSPHATE SERPL-MCNC: 3.2 MG/DL (ref 2.3–3.7)
PLATELET # BLD AUTO: 24 K/UL (ref 150–450)
PMV BLD AUTO: 11 FL (ref 9.4–12.3)
POTASSIUM SERPL-SCNC: 3.8 MMOL/L (ref 3.5–5.1)
PROT SERPL-MCNC: 5.8 G/DL (ref 6.3–8.2)
RBC # BLD AUTO: 2.34 M/UL (ref 4.05–5.2)
SODIUM SERPL-SCNC: 143 MMOL/L (ref 136–145)
URATE SERPL-MCNC: 2.1 MG/DL (ref 2.6–6)
WBC # BLD AUTO: 0.3 K/UL (ref 4.3–11.1)

## 2019-05-28 PROCEDURE — 86644 CMV ANTIBODY: CPT

## 2019-05-28 PROCEDURE — 86900 BLOOD TYPING SEROLOGIC ABO: CPT

## 2019-05-28 PROCEDURE — 74011250636 HC RX REV CODE- 250/636: Performed by: INTERNAL MEDICINE

## 2019-05-28 PROCEDURE — 74011250637 HC RX REV CODE- 250/637: Performed by: NURSE PRACTITIONER

## 2019-05-28 PROCEDURE — 83735 ASSAY OF MAGNESIUM: CPT

## 2019-05-28 PROCEDURE — 84550 ASSAY OF BLOOD/URIC ACID: CPT

## 2019-05-28 PROCEDURE — 80053 COMPREHEN METABOLIC PANEL: CPT

## 2019-05-28 PROCEDURE — 74011250637 HC RX REV CODE- 250/637: Performed by: INTERNAL MEDICINE

## 2019-05-28 PROCEDURE — 65270000029 HC RM PRIVATE

## 2019-05-28 PROCEDURE — 99233 SBSQ HOSP IP/OBS HIGH 50: CPT | Performed by: INTERNAL MEDICINE

## 2019-05-28 PROCEDURE — 86923 COMPATIBILITY TEST ELECTRIC: CPT

## 2019-05-28 PROCEDURE — 85025 COMPLETE CBC W/AUTO DIFF WBC: CPT

## 2019-05-28 PROCEDURE — 84100 ASSAY OF PHOSPHORUS: CPT

## 2019-05-28 PROCEDURE — 83615 LACTATE (LD) (LDH) ENZYME: CPT

## 2019-05-28 PROCEDURE — P9040 RBC LEUKOREDUCED IRRADIATED: HCPCS

## 2019-05-28 PROCEDURE — 74011000258 HC RX REV CODE- 258: Performed by: INTERNAL MEDICINE

## 2019-05-28 PROCEDURE — 74011000250 HC RX REV CODE- 250: Performed by: NURSE PRACTITIONER

## 2019-05-28 PROCEDURE — 36430 TRANSFUSION BLD/BLD COMPNT: CPT

## 2019-05-28 PROCEDURE — 36591 DRAW BLOOD OFF VENOUS DEVICE: CPT

## 2019-05-28 PROCEDURE — 77030039270 HC TU BLD FLTR CARD -A

## 2019-05-28 RX ORDER — SODIUM CHLORIDE 9 MG/ML
250 INJECTION, SOLUTION INTRAVENOUS AS NEEDED
Status: DISCONTINUED | OUTPATIENT
Start: 2019-05-28 | End: 2019-05-29

## 2019-05-28 RX ADMIN — DIPHENHYDRAMINE HYDROCHLORIDE 25 MG: 25 CAPSULE ORAL at 16:23

## 2019-05-28 RX ADMIN — CHLORHEXIDINE GLUCONATE 15 ML: 1.2 RINSE ORAL at 07:47

## 2019-05-28 RX ADMIN — PANTOPRAZOLE SODIUM 40 MG: 40 TABLET, DELAYED RELEASE ORAL at 07:46

## 2019-05-28 RX ADMIN — Medication 500 MG: at 07:46

## 2019-05-28 RX ADMIN — SODIUM CHLORIDE, PRESERVATIVE FREE 300 UNITS: 5 INJECTION INTRAVENOUS at 06:07

## 2019-05-28 RX ADMIN — HYDROCORTISONE: 1 CREAM TOPICAL at 07:51

## 2019-05-28 RX ADMIN — FLUCONAZOLE 200 MG: 100 TABLET ORAL at 07:45

## 2019-05-28 RX ADMIN — HYDROCORTISONE: 1 CREAM TOPICAL at 17:40

## 2019-05-28 RX ADMIN — SODIUM CHLORIDE, PRESERVATIVE FREE 300 UNITS: 5 INJECTION INTRAVENOUS at 21:10

## 2019-05-28 RX ADMIN — Medication 500 MG: at 16:24

## 2019-05-28 RX ADMIN — Medication 5 ML: at 11:23

## 2019-05-28 RX ADMIN — PRAVASTATIN SODIUM 10 MG: 20 TABLET ORAL at 22:00

## 2019-05-28 RX ADMIN — Medication 5 ML: at 16:30

## 2019-05-28 RX ADMIN — TERBINAFINE HYDROCHLORIDE: 1 CREAM TOPICAL at 18:00

## 2019-05-28 RX ADMIN — ESCITALOPRAM OXALATE 10 MG: 10 TABLET ORAL at 07:46

## 2019-05-28 RX ADMIN — TERBINAFINE HYDROCHLORIDE: 1 CREAM TOPICAL at 07:51

## 2019-05-28 RX ADMIN — Medication 5 ML: at 21:11

## 2019-05-28 RX ADMIN — ACETAMINOPHEN: 500 TABLET, FILM COATED ORAL at 22:00

## 2019-05-28 RX ADMIN — SUCRALFATE 1 G: 1 TABLET ORAL at 11:22

## 2019-05-28 RX ADMIN — ACYCLOVIR 400 MG: 800 TABLET ORAL at 07:46

## 2019-05-28 RX ADMIN — ALLOPURINOL 300 MG: 300 TABLET ORAL at 07:45

## 2019-05-28 RX ADMIN — SUCRALFATE 1 G: 1 TABLET ORAL at 07:46

## 2019-05-28 RX ADMIN — SUCRALFATE 1 G: 1 TABLET ORAL at 21:10

## 2019-05-28 RX ADMIN — POTASSIUM CHLORIDE 20 MEQ: 20 TABLET, EXTENDED RELEASE ORAL at 07:47

## 2019-05-28 RX ADMIN — Medication 500 MG: at 11:22

## 2019-05-28 RX ADMIN — ACYCLOVIR 400 MG: 800 TABLET ORAL at 16:24

## 2019-05-28 RX ADMIN — Medication 5 ML: at 07:47

## 2019-05-28 RX ADMIN — LORAZEPAM 1 MG: 1 TABLET ORAL at 22:01

## 2019-05-28 RX ADMIN — SUCRALFATE 1 G: 1 TABLET ORAL at 15:22

## 2019-05-28 RX ADMIN — ACETAMINOPHEN 650 MG: 325 TABLET, FILM COATED ORAL at 16:23

## 2019-05-28 RX ADMIN — CEFTRIAXONE SODIUM 2 G: 2 INJECTION, POWDER, FOR SOLUTION INTRAMUSCULAR; INTRAVENOUS at 15:22

## 2019-05-28 RX ADMIN — CHLORHEXIDINE GLUCONATE 15 ML: 1.2 RINSE ORAL at 16:24

## 2019-05-28 RX ADMIN — SODIUM CHLORIDE, PRESERVATIVE FREE 300 UNITS: 5 INJECTION INTRAVENOUS at 15:22

## 2019-05-28 NOTE — PROGRESS NOTES
[facility] Inpatient Hematology / Oncology Progress Note Admission Date: 2019 10:53 AM 
Reason for Admission/Hospital Course: Admission for antineoplastic chemotherapy [Z51.11] 24 Hour Events: 
Afebrile, VSS Day 20 post 7+3 Day 13 Midostaurin On Rocephin for strep bacteremia (EOT 6/) Family at bedside ROS: 
Constitutional:  negative for fever, chills, weakness, malaise, fatigue. CV:  negative for chest pain, palpitations, edema. Respiratory: negative for dyspnea, cough, wheezing. GI: negative abdominal pain, diarrhea,  nausea/vomiting. 10 point review of systems is otherwise negative with the exception of the elements mentioned above in the HPI. No Known Allergies OBJECTIVE: 
Patient Vitals for the past 8 hrs: 
 BP Temp Pulse Resp SpO2  
19 0737 131/66 98.4 °F (36.9 °C) 80 16 99 % 19 0348 152/61 98.3 °F (36.8 °C) 69 16 96 % Temp (24hrs), Av.2 °F (36.8 °C), Min:97.5 °F (36.4 °C), Max:98.5 °F (36.9 °C) 
 
 0701 -  1900 In: 360 [P.O.:360] Out: - Physical Exam: 
Constitutional: Well developed, well nourished female in no acute distress, sitting comfortably in bedside chair. HEENT: Normocephalic and atraumatic. Oropharynx is clear, mucous membranes are moist. Extraocular muscles are intact. Sclerae anicteric. Skin Warm and dry. No erythema. No pallor. + eczema like rash to hands. Ring worm appearance, small pea-sized area left thigh. Respiratory Lungs are clear to auscultation bilaterally without wheezes, rales or rhonchi, normal air exchange without accessory muscle use. CVS Normal rate, regular rhythm and normal S1 and S2. No murmurs, gallops, or rubs. Abdomen Soft, nontender and nondistended, normoactive bowel sounds. Neuro Grossly nonfocal with no obvious sensory or motor deficits. MSK Normal range of motion in general. BLE edema improved Psych Appropriate mood and affect. Labs: Recent Labs  
  05/28/19 0243 05/27/19 0440 05/26/19 
0520 WBC 0.3* 0.2* 0.4* RBC 2.34* 2.37* 2.40* HGB 7.0* 7.2* 7.4* HCT 21.3* 22.0* 22.3*  
MCV 91.0 92.8 92.9 MCH 29.9 30.4 30.8 MCHC 32.9 32.7 33.2 RDW 13.8 14.2 14.5 PLT 24* 28* 39* GRANS 4*  --   --   
LYMPH 96*  --   -- MONOS 0*  --   --   
EOS 0*  --   --   
BASOS 0  --   --   
IG 0  --   --   
DF AUTOMATED MANUAL AUTOMATED ANEU 0.0*  --   --   
ABL 0.2*  --   --   
ABM 0.0*  --   --   
ALEKSANDR 0.0  --   --   
ABB 0.0  --   --   
AIG 0.0  --   --   
 
  
Recent Labs  
  05/28/19 0243 05/27/19 0440 05/26/19 
0520  142 143  
K 3.8 3.9 4.1 * 107 108* CO2 29 30 29 AGAP 6* 5* 6*  
GLU 91 95 86 BUN 10 8 7* CREA 0.56* 0.57* 0.55* GFRAA >60 >60 >60 GFRNA >60 >60 >60  
CA 8.2* 8.4 8.4 SGOT 13* 14* 14* AP 75 75 67  
TP 5.8* 6.0* 5.8* ALB 3.1* 3.0* 2.9*  
GLOB 2.7 3.0 2.9 AGRAT 1.1* 1.0* 1.0*  
MG 2.4 2.4 2.4 PHOS 3.2 3.6 3.3 Imaging: XR CHEST SNGL V [[id number]] Collected: 05/18/19 0855 Order Status: Completed Updated: 05/18/19 0489 Narrative:    
EXAM: Single view chest radiograph. INDICATION: 73 years Fever COMPARISON: None. FINDINGS: 
No focal lung opacity. No pneumothorax. No pleural effusion. The heart, 
mediastinum, jhoana, and pulmonary vasculature are within normal limits. No 
evidence of acute osseous abnormality. Right-sided chest port with tip 
terminating in the right atrium. Impression:    
IMPRESSION:  
1. No evidence of pneumonia. Medications: 
Current Facility-Administered Medications Medication Dose Route Frequency  0.9% sodium chloride infusion 250 mL  250 mL IntraVENous PRN  
 terbinafine HCl (LAMISIL) 1 % cream   Topical BID  hydrocortisone-aloe vera 1 % topical cream   Topical BID  
 0.9% sodium chloride infusion 250 mL  250 mL IntraVENous PRN  
 simethicone (MYLICON) tablet 80 mg  80 mg Oral QID PRN  
  cefTRIAXone (ROCEPHIN) 2 g in 0.9% sodium chloride (MBP/ADV) 50 mL  2 g IntraVENous Q24H  
 heparin (porcine) pf 300 Units  300 Units InterCATHeter Q8H  psyllium husk-aspartame (METAMUCIL FIBER) packet 1 Packet  1 Packet Oral BID PRN  
 0.9% sodium chloride infusion 250 mL  250 mL IntraVENous PRN  potassium chloride (K-DUR, KLOR-CON) SR tablet 20 mEq  20 mEq Oral DAILY  chlorhexidine (PERIDEX) 0.12 % mouthwash 15 mL  15 mL Oral BID  midostaurin (RYDAPT) chemo capsule 50 mg (Patient Supplied)  50 mg Oral Q12H  
 sucralfate (CARAFATE) tablet 1 g  1 g Oral AC&HS  pantoprazole (PROTONIX) tablet 40 mg  40 mg Oral ACB  alum-mag hydroxide-simeth (MYLANTA) oral suspension 30 mL  30 mL Oral Q4H PRN  
 magic mouthwash (RENÉ) oral suspension 5 mL  5 mL Oral AC&HS  morphine injection 2 mg  2 mg IntraVENous Q4H PRN  
 HYDROcodone-acetaminophen (NORCO) 5-325 mg per tablet 1 Tab  1 Tab Oral Q6H PRN  psyllium husk-aspartame (METAMUCIL FIBER) packet 1 Packet  1 Packet Oral BID PRN  
 central line flush (saline) syringe 10 mL  10 mL InterCATHeter PRN  
 0.9% sodium chloride infusion 250 mL  250 mL IntraVENous PRN  polyethylene glycol (MIRALAX) packet 17 g  17 g Oral DAILY PRN  
 LORazepam (ATIVAN) injection 0.5 mg  0.5 mg IntraVENous Q6H PRN  
 acetaminophen (TYLENOL) tablet 650 mg  650 mg Oral PRN  
 diphenhydrAMINE (BENADRYL) capsule 25 mg  25 mg Oral PRN  
 acetaminophen/diphenhydrAMINE (TYLENOL PM EXT STR) 500/25 mg   Oral QHS  acyclovir (ZOVIRAX) tablet 400 mg  400 mg Oral BID  allopurinol (ZYLOPRIM) tablet 300 mg  300 mg Oral DAILY  calcium carbonate (OS-CHRIS) tablet 500 mg [elemental]  500 mg Oral TID WITH MEALS  escitalopram oxalate (LEXAPRO) tablet 10 mg  10 mg Oral DAILY  fluconazole (DIFLUCAN) tablet 200 mg  200 mg Oral DAILY  lidocaine-prilocaine (EMLA) 2.5-2.5 % cream   Topical PRN  
 LORazepam (ATIVAN) tablet 1 mg  1 mg Oral QHS  ondansetron (ZOFRAN ODT) tablet 8 mg  8 mg Oral Q8H PRN  pravastatin (PRAVACHOL) tablet 10 mg  10 mg Oral QHS  vit C-E-zinc cit-lutein-zeaxan 50 mg-15 unit- 4.5 mg-2.5 mg chew (OCU-ABDOUL) 1 Tab (Patient Supplied)  1 Tab Oral DAILY  ergocalciferol capsule 50,000 Units  50,000 Units Oral every Wednesday ASSESSMENT: 
 
Problem List  Date Reviewed: 4/30/2019 Codes Class Noted Acute myeloid leukemia not having achieved remission ([facility] ICD-10-CM: C92.00 ICD-9-CM: 205.00  5/9/2019 Admission for antineoplastic chemotherapy ICD-10-CM: Z51.11 ICD-9-CM: V58.11  5/5/2019 AML (acute myeloblastic leukemia) ([facility] ICD-10-CM: C92.00 ICD-9-CM: 205.00  4/28/2019 Weakness generalized ICD-10-CM: R53.1 ICD-9-CM: 780.79  4/28/2019 Pancytopenia ([facility] ICD-10-CM: Q77.870 ICD-9-CM: 284.19  4/28/2019 Thrombocytopenia ([facility] ICD-10-CM: D69.6 ICD-9-CM: 287.5  4/27/2019 PLAN: 
AML FLT 3+ 
5/6  BMbx planned for Tuesday then wait for [facility] from port results to determine start date of  cycle one 7 + 3 with Midostaurin day 8-21. Platelets will need to be at least 50k for bx tomorrow 5/7. BMbx today-platelets prior. Also needed prbc today for hgb 6.9. 
5/08 Start 7+3 when afebrile per Dr. Edy Adkins. 5/09 Day 1 of 7+3. Monitor TLS labs. 5/10 Day 2 7+3. Tolerating well. 5/13 Day 5 7+3. Midostaurin on the way. 5/16 Day 8 7+3. Day one Rydapt. 5/28 Day 20 7+3, Day 13 Midostaurin. Awaiting count recovery, she will need a repeat BMbx after count recovery. Pancytopenia related to chemotherapy 5/16 transfuse per [facility] SOPs 
5/28 PRBCs today 
  
Possible port infection 5/5 Day one vanc/cefe. Follow cultures. Do not use port. May need to have Echo if cultures positive. 5/6 BCNTD. Appears improved today. Continue to monitor. No fevers or other symptoms. 5/7 Day 3 vanc/cefe. Site appears even better today. 5/8 Does not appear infected. Site bruised. 5/9 BCx negative. Per ID okay to start treatment. 5/13 Completed 7 days Vanc/cef. Now on levaquin per ID. 5/20 BC from port +GPC alpha strep-ID consulted 5/21 BC repeated today. ID following for final read on previous cultures to determine if port needs to be removed. ECHO pending.  
5/22 Echo with no vegetation. BC + strep parasanguinis-ID following. Repeat BC NTD 
5/23 ID changed abx to Rocephin x 2 weeks EOT 6/15/19 then repeat BC x 2 post 7 days tx. No port removal needed. 5/28 Con't Rocephin (EOT 6/5) then repeat BCx ~ 7 days after therapy completed x 2, 24 hours apart Neutropenic Fever 05/05 Pan-culture negative. On Vancomycin, Maxipime 05/08 Febrile overnight up to 102.5. Repeat cultures x 1. Continue antibiotics. Likely disease related. Consult ID for clearance. 05/09 BC remain negative. No fevers 48 hours. Continue vanc/cefe. 05/13 now only on LVQ per ID. 5/18 Neutropenic fever - 100.5. Pan-cultures obtained. Chest xray negative. Started on Vanc/Cef. 5/19 Afebrile. Port culture with gram positive cocci in chains, await further studies. Continue current antibiotics. 5/20 port +BC with GPC alpha strep-ID consulted 5/23 ID changed abx to Rocephin x 2 weeks EOT 6/5/19 then repeat BC x 2 post 7 days tx. No port removal needed. 5/28 Remains afebrile. 5/28 Con't Rocephin (EOT 6/5) then repeat BCx ~ 7 days after therapy completed x 2, 24 hours apart Bradycardia 5/14 EKG with no significant change. Skin Changes 05/26 Hydrocortisone to hands. Anti-fungal cream to area of ring worm. [facility] SOPs Supportive care ACV/Diflucan 
LVQ dc'd while on cefe and vanc Disposition:  Awaiting count recovery. Patient is worried about having repeat BMbx due to bad experience with first one at [facility]/ We will try to arrange for BMbx with sedation in IR when appropriate. Goals and plan of care reviewed with the patient. All questions answered to the best of our ability.  
 
 
Omaira Ching, NP 
 [facility] [address] [place] [address] Michelle [address] [place] [address] Office : [phone] Fax : [phone] Attending Addendum: 
I have personally performed a face to face diagnostic evaluation on this patient. I have reviewed and agree with the care plan as documented by Jamin Cosme N.P. 36 minutes were spent on patient care, including but not limited to, reviewing the chart and time with the patient and family, more than 50% of the time documented was spent in face-to-face contact with the patient and in the care of the patient on the floor/unit where the patient is located. My findings are as follows: She has AML, appears fatigued, heart rate regular without murmurs, abdomen is non-tender, bowel sounds are positive, we will continue IV ABX and follow-up her cultures, we will also transfuse PRBCs today. Zarina Loya MD 
 
 
[facility] [id number] [address] Office : [phone] Fax : [phone]

## 2019-05-28 NOTE — PROGRESS NOTES
Problem: Falls - Risk of 
Goal: *Absence of Falls Description Document Kamaljit Monaco Fall Risk and appropriate interventions in the flowsheet. Outcome: Progressing Towards Goal 
  
Problem: Discharge Planning Goal: *Discharge to safe environment Outcome: Progressing Towards Goal 
Goal: *Knowledge of medication management Outcome: Progressing Towards Goal 
Goal: *Knowledge of discharge instructions Outcome: Progressing Towards Goal 
  
Problem: Anemia Care Plan (Adult and Pediatric) Goal: *Labs within defined limits Outcome: Progressing Towards Goal 
Goal: *Tolerates increased activity Outcome: Progressing Towards Goal 
  
Problem: Pain Goal: *Control of Pain Outcome: Progressing Towards Goal 
  
Problem: Nutrition Deficit Goal: *Optimize nutritional status Outcome: Progressing Towards Goal 
  
Problem: Chemotherapy, Day 3 to Discharge Goal: Off Pathway (Use only if patient is Off Pathway) Outcome: Progressing Towards Goal 
Goal: Activity/Safety Outcome: Progressing Towards Goal 
Goal: Consults, if ordered Outcome: Progressing Towards Goal 
Goal: Diagnostic Test/Procedures Outcome: Progressing Towards Goal 
Goal: Nutrition/Diet Outcome: Progressing Towards Goal 
Goal: Discharge Planning Outcome: Progressing Towards Goal 
Goal: Medications Outcome: Progressing Towards Goal 
Goal: Respiratory Outcome: Progressing Towards Goal 
Goal: Treatments/Interventions/Procedures Outcome: Progressing Towards Goal 
Goal: Psychosocial 
Outcome: Progressing Towards Goal 
Goal: *Optimal pain control at patient's stated goal 
Outcome: Progressing Towards Goal 
Goal: *Hemodynamically stable Outcome: Progressing Towards Goal 
Goal: *Adequate oxygenation Outcome: Progressing Towards Goal

## 2019-05-28 NOTE — INTERDISCIPLINARY ROUNDS
Interdisciplinary rounds with charge nurse, provider,  and . Presenting Problem: AML Daily Goal ABT, esophagitis care, monitor counts and vitals Expected Discharge Date: TBD- BMBx when counts recover Expected Discharge Needs: none at this time Patient/Family Concerns addressed

## 2019-05-28 NOTE — PROGRESS NOTES
END OF SHIFT NOTE: 
 
Intake/Output 05/28 0701 - 05/28 1900 In: 955 [P.O.:955] Out: 1200 [Urine:1200] Voiding: YES Catheter: NO 
Drain:   
 
 
 
 
 
Stool:  1 occurrences. Stool Assessment Stool Color: Brown(per pt) (05/24/19 3057) Stool Appearance: Soft;Loose(per pt) (05/28/19 0753) Stool Amount: Medium(per pt) (05/24/19 4316) Stool Source/Status: Rectum (05/24/19 1781) Emesis:  0 occurrences. Emesis Assessment Appearance: Undigested food (05/18/19 0258) Emesis Amount: Small (05/18/19 0258) VITAL SIGNS Patient Vitals for the past 12 hrs: 
 Temp Pulse Resp BP SpO2  
05/28/19 1705 98.4 °F (36.9 °C) 61 19 166/63 96 % 05/28/19 1648 98.1 °F (36.7 °C) (!) 59 17 155/68 97 % 05/28/19 1540 98.3 °F (36.8 °C) 75 18 155/68 98 % 05/28/19 1103 98.2 °F (36.8 °C) 76 18 155/66 96 % 05/28/19 0737 98.4 °F (36.9 °C) 80 16 131/66 99 % Pain Assessment Pain 1 Pain Scale 1: Visual (05/28/19 0227) Pain Intensity 1: 0 (05/28/19 0227) Patient Stated Pain Goal: 0 (05/28/19 0227) Pain Reassessment 1: Patient sleeping (05/28/19 0227) Pain Onset 1: now (05/17/19 1600) Pain Location 1: Chest (05/17/19 1600) Pain Orientation 1: Mid (05/17/19 1600) Pain Description 1: Sore (05/17/19 1600) Pain Intervention(s) 1: Medication (see MAR) (05/17/19 1600) Ambulating Yes Additional Information: Pt received 1 unit of PRBC and tolerated well. Pt ambulating in hallway. No other needs at this time. Shift report given to oncoming nurse at the bedside. Kilo Ulloa

## 2019-05-28 NOTE — PROGRESS NOTES
Infectious Disease Progress Note Today's Date: 2019 Admit Date: 2019 Impression: · Newly diagnosed AML, started induction chemotherapy on 19 · Fever · S/p PORT placement (), blood cultures growing STREPTOCOCCUS PARASANGUINIS  1/2 (Port)(); repeat blood cultures  () NGTD, TTE negative. · Pancytopenia Recommendations:  
· Continue Rocephin to treat bacteriemia X 2 weeks past (-) BC; EOT 19 · Repeat blood cultures ~ 7 days after therapy completed X 2 ,24 hrs apart · Continue Diflucan/ACV prophylaxis pending count recovery · ID will sign off at this point, please call if needed. Anti-infectives: · LVQ (-) ( - · Vanc/Cefepime (-) Restarted - Subjective: No complaints. Afebrile, still cytopenic,  blood cultures negative No Known Allergies Review of Systems:  A comprehensive review of systems was negative except for that written in the History of Present Illness. Objective:  
 
Visit Vitals /66 (BP 1 Location: Right arm) Pulse 80 Temp 98.4 °F (36.9 °C) Resp 16 Ht 5' 6\" (1.676 m) Wt 85.1 kg (187 lb 11.2 oz) SpO2 99% Breastfeeding? No  
BMI 30.30 kg/m² Temp (24hrs), Av.2 °F (36.8 °C), Min:97.5 °F (36.4 °C), Max:98.5 °F (36.9 °C) Lines:  R chest port w/o erythema or swelling Physical Exam:   
General:  Alert, cooperative, well noursished, well developed, appears stated age Eyes:  Sclera anicteric. Pupils equally round and reactive to light. Neck: Supple Lungs:   Clear to auscultation bilaterally, good effort CV:  Regular rate and rhythm Abdomen:   non-distended Extremities: No cyanosis or edema Skin: Scattered eccymoses Musculoskeletal: No swelling or deformity Lines/Devices:  Intact, brusing around port area fading. Not hot or red Psych: Alert and oriented, normal mood affect given the setting Data Review: CBC: 
Recent Labs  
  19 
0243 19 9503 05/26/19 
9229 WBC 0.3* 0.2* 0.4* GRANS 4*  --   -- MONOS 0*  --   --   
EOS 0*  --   --   
ANEU 0.0*  --   --   
ABL 0.2*  --   --   
HGB 7.0* 7.2* 7.4* HCT 21.3* 22.0* 22.3*  
PLT 24* 28* 39* BMP: 
Recent Labs  
  05/28/19 0243 05/27/19 0440 05/26/19 0520 CREA 0.56* 0.57* 0.55* BUN 10 8 7*  142 143  
K 3.8 3.9 4.1 * 107 108* CO2 29 30 29 AGAP 6* 5* 6*  
GLU 91 95 86 LFTS: 
Recent Labs  
  05/28/19 0243 05/27/19 0440 05/26/19 0520 TBILI 0.3 0.4 0.3 ALT 25 23 23 SGOT 13* 14* 14* AP 75 75 67  
TP 5.8* 6.0* 5.8* ALB 3.1* 3.0* 2.9* Microbiology:  
 
All Micro Results Procedure Component Value Units Date/Time CULTURE, BLOOD [[id number]] Collected:  05/21/19 0301 Order Status:  Completed Specimen:  Blood Updated:  05/26/19 0589 Special Requests: --     
  NO SPECIAL REQUESTS 
RIGHT 
HAND Culture result: NO GROWTH 5 DAYS     
 CULTURE, BLOOD [[id number]] Collected:  05/21/19 0300 Order Status:  Completed Specimen:  Blood Updated:  05/26/19 8610 Special Requests: --     
  NO SPECIAL REQUESTS PORT Culture result: NO GROWTH 5 DAYS     
 CULTURE, BLOOD [[id number]] Collected:  05/18/19 8395 Order Status:  Completed Specimen:  Blood Updated:  05/23/19 1025 Special Requests: --     
  LEFT 
HAND Culture result: NO GROWTH 5 DAYS     
 CULTURE, BLOOD [[id number]]  (Abnormal)  (Susceptibility) Collected:  05/18/19 0830 Order Status:  Completed Specimen:  Blood Updated:  05/22/19 4841 Special Requests: SINGLE PORT     
  GRAM STAIN GRAM POS COCCI IN CHAINS ANAEROBIC BOTTLE POSITIVE RESULTS VERIFIED, PHONED TO AND READ BACK BY Holli Le RN AT 1249 ON 5/19/19  SAKD Culture result: STREPTOCOCCUS PARASANGUINIS  
     
   ID AND SUSCEPTIBILITY REQUESTED BY DR. Jorge Coreas  
 CULTURE, URINE [[id number]] Collected:  05/18/19 7991 Order Status:  Completed Specimen:  Urine from Clean catch Updated:  19 0840 Special Requests: NO SPECIAL REQUESTS Culture result: NO GROWTH 2 DAYS     
 CULTURE, BLOOD [[id number]] Collected:  19 2241 Order Status:  Completed Specimen:  Blood Updated:  19 0747 Special Requests: --     
  RIGHT Antecubital 
  
  Culture result: NO GROWTH 5 DAYS     
 CULTURE, BLOOD [[id number]] Collected:  19 1251 Order Status:  Completed Specimen:  Blood Updated:  05/10/19 6078 Special Requests: SINGLE PORT Culture result: NO GROWTH 5 DAYS     
 CULTURE, BLOOD [[id number]] Collected:  19 1309 Order Status:  Completed Specimen:  Blood Updated:  05/10/19 2316 Special Requests: --     
  RIGHT Antecubital 
  
  Culture result: NO GROWTH 5 DAYS     
 CULTURE, URINE [[id number]] Collected:  19 2321 Order Status:  Completed Specimen:  Urine from Clean catch Updated:  05/10/19 0926 Special Requests: NO SPECIAL REQUESTS Culture result: NO GROWTH 2 DAYS Imagin/18/19 CXR (-) Signed By: Jose Alfredo Slaughter MD   
 May 28, 2019

## 2019-05-28 NOTE — PROGRESS NOTES
END OF SHIFT NOTE: 
 
Intake/Output 05/27 1901 - 05/28 0700 In: 500 [P.O.:500] Out: 1500 [Urine:1500] Voiding: YES Catheter: NO 
Drain:   
 
 
 
 
 
Stool:  0 occurrences. Stool Assessment Stool Color: Brown(per pt) (05/24/19 8960) Stool Appearance: Soft;Loose(per pt) (05/27/19 6705) Stool Amount: Medium(per pt) (05/24/19 1869) Stool Source/Status: Rectum (05/24/19 9856) Emesis:  0 occurrences. Emesis Assessment Appearance: Undigested food (05/18/19 0258) Emesis Amount: Small (05/18/19 0258) VITAL SIGNS Patient Vitals for the past 12 hrs: 
 Temp Pulse Resp BP SpO2  
05/28/19 0348 98.3 °F (36.8 °C) 69 16 152/61 96 % 05/27/19 2353 98.3 °F (36.8 °C) 74 16 151/66 97 % 05/27/19 2115 98.5 °F (36.9 °C) 78 16 152/61 97 % Pain Assessment Pain 1 Pain Scale 1: Visual (05/28/19 0227) Pain Intensity 1: 0 (05/28/19 0227) Patient Stated Pain Goal: 0 (05/28/19 0227) Pain Reassessment 1: Patient sleeping (05/28/19 0227) Pain Onset 1: now (05/17/19 1600) Pain Location 1: Chest (05/17/19 1600) Pain Orientation 1: Mid (05/17/19 1600) Pain Description 1: Sore (05/17/19 1600) Pain Intervention(s) 1: Medication (see MAR) (05/17/19 1600) Ambulating Yes Additional Information: - VSS/afebrile 
- hgb 7.0 this AM -- 1u PRBCs ordered for today\ 
- no complaints of pain, up ad sarai and ambulating halls. Slept well. - MMW at bedside, pt using.  
- Day 20 7+3 Shift report given to oncoming nurse at the bedside.  
 
Markie Bynum RN

## 2019-05-29 LAB
ABO + RH BLD: NORMAL
ALBUMIN SERPL-MCNC: 3.2 G/DL (ref 3.2–4.6)
ALBUMIN/GLOB SERPL: 1.1 {RATIO} (ref 1.2–3.5)
ALP SERPL-CCNC: 84 U/L (ref 50–136)
ALT SERPL-CCNC: 23 U/L (ref 12–65)
ANION GAP SERPL CALC-SCNC: 8 MMOL/L (ref 7–16)
AST SERPL-CCNC: 10 U/L (ref 15–37)
BILIRUB SERPL-MCNC: 0.4 MG/DL (ref 0.2–1.1)
BLD PROD TYP BPU: NORMAL
BLOOD GROUP ANTIBODIES SERPL: NORMAL
BPU ID: NORMAL
BUN SERPL-MCNC: 10 MG/DL (ref 8–23)
CALCIUM SERPL-MCNC: 8.8 MG/DL (ref 8.3–10.4)
CHLORIDE SERPL-SCNC: 106 MMOL/L (ref 98–107)
CO2 SERPL-SCNC: 28 MMOL/L (ref 21–32)
CREAT SERPL-MCNC: 0.63 MG/DL (ref 0.6–1)
CROSSMATCH RESULT,%XM: NORMAL
DIFFERENTIAL METHOD BLD: ABNORMAL
ERYTHROCYTE [DISTWIDTH] IN BLOOD BY AUTOMATED COUNT: 13.9 % (ref 11.9–14.6)
GLOBULIN SER CALC-MCNC: 2.9 G/DL (ref 2.3–3.5)
GLUCOSE SERPL-MCNC: 87 MG/DL (ref 65–100)
HCT VFR BLD AUTO: 24.8 % (ref 35.8–46.3)
HGB BLD-MCNC: 8.3 G/DL (ref 11.7–15.4)
LDH SERPL L TO P-CCNC: 165 U/L (ref 110–210)
MAGNESIUM SERPL-MCNC: 2.4 MG/DL (ref 1.8–2.4)
MCH RBC QN AUTO: 30.1 PG (ref 26.1–32.9)
MCHC RBC AUTO-ENTMCNC: 33.5 G/DL (ref 31.4–35)
MCV RBC AUTO: 89.9 FL (ref 79.6–97.8)
NRBC # BLD: 0 K/UL (ref 0–0.2)
PHOSPHATE SERPL-MCNC: 3.8 MG/DL (ref 2.3–3.7)
PLATELET # BLD AUTO: 17 K/UL (ref 150–450)
PLATELET COMMENTS,PCOM: ABNORMAL
PMV BLD AUTO: 10.3 FL (ref 9.4–12.3)
POTASSIUM SERPL-SCNC: 4 MMOL/L (ref 3.5–5.1)
PROT SERPL-MCNC: 6.1 G/DL (ref 6.3–8.2)
RBC # BLD AUTO: 2.76 M/UL (ref 4.05–5.2)
RBC MORPH BLD: ABNORMAL
SODIUM SERPL-SCNC: 142 MMOL/L (ref 136–145)
SPECIMEN EXP DATE BLD: NORMAL
STATUS OF UNIT,%ST: NORMAL
UNIT DIVISION, %UDIV: 0
URATE SERPL-MCNC: 2.2 MG/DL (ref 2.6–6)
WBC # BLD AUTO: 0.3 K/UL (ref 4.3–11.1)
WBC MORPH BLD: ABNORMAL

## 2019-05-29 PROCEDURE — 65270000029 HC RM PRIVATE

## 2019-05-29 PROCEDURE — 74011250637 HC RX REV CODE- 250/637: Performed by: INTERNAL MEDICINE

## 2019-05-29 PROCEDURE — 83735 ASSAY OF MAGNESIUM: CPT

## 2019-05-29 PROCEDURE — 84550 ASSAY OF BLOOD/URIC ACID: CPT

## 2019-05-29 PROCEDURE — 84100 ASSAY OF PHOSPHORUS: CPT

## 2019-05-29 PROCEDURE — 80053 COMPREHEN METABOLIC PANEL: CPT

## 2019-05-29 PROCEDURE — 36591 DRAW BLOOD OFF VENOUS DEVICE: CPT

## 2019-05-29 PROCEDURE — 74011000250 HC RX REV CODE- 250: Performed by: NURSE PRACTITIONER

## 2019-05-29 PROCEDURE — 74011250637 HC RX REV CODE- 250/637: Performed by: NURSE PRACTITIONER

## 2019-05-29 PROCEDURE — 99233 SBSQ HOSP IP/OBS HIGH 50: CPT | Performed by: INTERNAL MEDICINE

## 2019-05-29 PROCEDURE — 85025 COMPLETE CBC W/AUTO DIFF WBC: CPT

## 2019-05-29 PROCEDURE — 74011250636 HC RX REV CODE- 250/636: Performed by: INTERNAL MEDICINE

## 2019-05-29 PROCEDURE — 74011000258 HC RX REV CODE- 258: Performed by: INTERNAL MEDICINE

## 2019-05-29 PROCEDURE — 83615 LACTATE (LD) (LDH) ENZYME: CPT

## 2019-05-29 RX ADMIN — PANTOPRAZOLE SODIUM 40 MG: 40 TABLET, DELAYED RELEASE ORAL at 08:33

## 2019-05-29 RX ADMIN — TERBINAFINE HYDROCHLORIDE: 1 CREAM TOPICAL at 08:36

## 2019-05-29 RX ADMIN — Medication 500 MG: at 11:38

## 2019-05-29 RX ADMIN — ACETAMINOPHEN: 500 TABLET, FILM COATED ORAL at 22:10

## 2019-05-29 RX ADMIN — SODIUM CHLORIDE, PRESERVATIVE FREE 300 UNITS: 5 INJECTION INTRAVENOUS at 22:11

## 2019-05-29 RX ADMIN — CHLORHEXIDINE GLUCONATE 15 ML: 1.2 RINSE ORAL at 08:34

## 2019-05-29 RX ADMIN — Medication 5 ML: at 08:36

## 2019-05-29 RX ADMIN — ACYCLOVIR 400 MG: 800 TABLET ORAL at 08:34

## 2019-05-29 RX ADMIN — SUCRALFATE 1 G: 1 TABLET ORAL at 22:11

## 2019-05-29 RX ADMIN — POTASSIUM CHLORIDE 20 MEQ: 20 TABLET, EXTENDED RELEASE ORAL at 08:33

## 2019-05-29 RX ADMIN — ACYCLOVIR 400 MG: 800 TABLET ORAL at 16:49

## 2019-05-29 RX ADMIN — FLUCONAZOLE 200 MG: 100 TABLET ORAL at 08:33

## 2019-05-29 RX ADMIN — LORAZEPAM 1 MG: 1 TABLET ORAL at 22:11

## 2019-05-29 RX ADMIN — SUCRALFATE 1 G: 1 TABLET ORAL at 16:49

## 2019-05-29 RX ADMIN — ERGOCALCIFEROL 50000 UNITS: 1.25 CAPSULE ORAL at 06:16

## 2019-05-29 RX ADMIN — HYDROCORTISONE: 1 CREAM TOPICAL at 08:36

## 2019-05-29 RX ADMIN — SUCRALFATE 1 G: 1 TABLET ORAL at 11:38

## 2019-05-29 RX ADMIN — CEFTRIAXONE SODIUM 2 G: 2 INJECTION, POWDER, FOR SOLUTION INTRAMUSCULAR; INTRAVENOUS at 16:49

## 2019-05-29 RX ADMIN — HYDROCORTISONE: 1 CREAM TOPICAL at 16:55

## 2019-05-29 RX ADMIN — ESCITALOPRAM OXALATE 10 MG: 10 TABLET ORAL at 08:33

## 2019-05-29 RX ADMIN — Medication 5 ML: at 16:30

## 2019-05-29 RX ADMIN — TERBINAFINE HYDROCHLORIDE: 1 CREAM TOPICAL at 19:41

## 2019-05-29 RX ADMIN — Medication 5 ML: at 11:30

## 2019-05-29 RX ADMIN — Medication 500 MG: at 16:49

## 2019-05-29 RX ADMIN — SODIUM CHLORIDE, PRESERVATIVE FREE 300 UNITS: 5 INJECTION INTRAVENOUS at 16:52

## 2019-05-29 RX ADMIN — Medication 5 ML: at 22:11

## 2019-05-29 RX ADMIN — CHLORHEXIDINE GLUCONATE 15 ML: 1.2 RINSE ORAL at 16:48

## 2019-05-29 RX ADMIN — SODIUM CHLORIDE, PRESERVATIVE FREE 300 UNITS: 5 INJECTION INTRAVENOUS at 05:51

## 2019-05-29 RX ADMIN — Medication 10 ML: at 22:11

## 2019-05-29 RX ADMIN — PRAVASTATIN SODIUM 10 MG: 20 TABLET ORAL at 22:10

## 2019-05-29 RX ADMIN — SUCRALFATE 1 G: 1 TABLET ORAL at 08:33

## 2019-05-29 RX ADMIN — Medication 500 MG: at 08:33

## 2019-05-29 RX ADMIN — ALLOPURINOL 300 MG: 300 TABLET ORAL at 08:33

## 2019-05-29 NOTE — PROGRESS NOTES
END OF SHIFT NOTE: 
 
Intake/Output 05/29 0701 - 05/29 1900 In: 840 [P.O.:840] Out: 2000 [UOMHT:3603] Voiding: YES Catheter: NO 
Drain:   
 
 
 
 
 
Stool:  2 occurrences. Stool Assessment Stool Color: Brown(per pt) (05/24/19 1872) Stool Appearance: Loose;Soft(per pt) (05/29/19 0836) Stool Amount: Medium(per pt) (05/24/19 5297) Stool Source/Status: Rectum (05/24/19 8265) Emesis:  0 occurrences. Emesis Assessment Appearance: Undigested food (05/18/19 0258) Emesis Amount: Small (05/18/19 0258) VITAL SIGNS Patient Vitals for the past 12 hrs: 
 Temp Pulse Resp BP SpO2  
05/29/19 1606 98.4 °F (36.9 °C) 71 16 133/74 95 % 05/29/19 1148 98.2 °F (36.8 °C) 69 18 147/77 97 % 05/29/19 0748 98.1 °F (36.7 °C) 71 18 147/72 94 % Pain Assessment Pain 1 Pain Scale 1: Visual (05/29/19 0138) Pain Intensity 1: 0 (05/29/19 0138) Patient Stated Pain Goal: 0 (05/29/19 0138) Pain Reassessment 1: Patient sleeping (05/29/19 0138) Pain Onset 1: now (05/17/19 1600) Pain Location 1: Chest (05/17/19 1600) Pain Orientation 1: Mid (05/17/19 1600) Pain Description 1: Sore (05/17/19 1600) Pain Intervention(s) 1: Medication (see MAR) (05/17/19 1600) Ambulating 
yes Additional Information: Pt resting in bed with  at bedside. No complaints during shift. Last dose or Rydapt at 06:00 am 5/30/19. No other needs at this time. Shift report given to oncoming nurse at the bedside. Eliud Mcnamara

## 2019-05-29 NOTE — PROGRESS NOTES
Problem: Falls - Risk of 
Goal: *Absence of Falls Description Document Lucinda Dahl Fall Risk and appropriate interventions in the flowsheet. Outcome: Progressing Towards Goal 
  
Problem: Discharge Planning Goal: *Discharge to safe environment Outcome: Progressing Towards Goal 
Goal: *Knowledge of medication management Outcome: Progressing Towards Goal 
Goal: *Knowledge of discharge instructions Outcome: Progressing Towards Goal 
  
Problem: Anemia Care Plan (Adult and Pediatric) Goal: *Labs within defined limits Outcome: Progressing Towards Goal 
Goal: *Tolerates increased activity Outcome: Progressing Towards Goal 
  
Problem: Pain Goal: *Control of Pain Outcome: Progressing Towards Goal 
  
Problem: Nutrition Deficit Goal: *Optimize nutritional status Outcome: Progressing Towards Goal 
  
Problem: Chemotherapy, Day 3 to Discharge Goal: Off Pathway (Use only if patient is Off Pathway) Outcome: Progressing Towards Goal 
Goal: Activity/Safety Outcome: Progressing Towards Goal 
Goal: Consults, if ordered Outcome: Progressing Towards Goal 
Goal: Diagnostic Test/Procedures Outcome: Progressing Towards Goal 
Goal: Nutrition/Diet Outcome: Progressing Towards Goal 
Goal: Discharge Planning Outcome: Progressing Towards Goal 
Goal: Medications Outcome: Progressing Towards Goal 
Goal: Respiratory Outcome: Progressing Towards Goal 
Goal: Treatments/Interventions/Procedures Outcome: Progressing Towards Goal 
Goal: Psychosocial 
Outcome: Progressing Towards Goal 
Goal: *Optimal pain control at patient's stated goal 
Outcome: Progressing Towards Goal 
Goal: *Hemodynamically stable Outcome: Progressing Towards Goal 
Goal: *Adequate oxygenation Outcome: Progressing Towards Goal

## 2019-05-29 NOTE — PROGRESS NOTES
END OF SHIFT NOTE: 
 
Intake/Output 05/28 1901 - 05/29 0700 In: 1300 [P.O.:680; I.V.:310] Out: 1300 [Urine:1300] Voiding: YES Catheter: NO 
Drain:   
 
 
 
 
 
Stool:  0 occurrences. Stool Assessment Stool Color: Brown(per pt) (05/24/19 7750) Stool Appearance: Soft;Loose(per pt) (05/28/19 0753) Stool Amount: Medium(per pt) (05/24/19 1949) Stool Source/Status: Rectum (05/24/19 1054) Emesis:  0 occurrences. Emesis Assessment Appearance: Undigested food (05/18/19 0258) Emesis Amount: Small (05/18/19 0258) VITAL SIGNS Patient Vitals for the past 12 hrs: 
 Temp Pulse Resp BP SpO2  
05/29/19 0311 98 °F (36.7 °C) 60 18 147/81 95 % 05/28/19 2340 98.1 °F (36.7 °C) (!) 53 18 155/65 98 % 05/28/19 2000 98.1 °F (36.7 °C) 68 18 170/60 95 % Pain Assessment Pain 1 Pain Scale 1: Visual (05/29/19 0138) Pain Intensity 1: 0 (05/29/19 0138) Patient Stated Pain Goal: 0 (05/29/19 0138) Pain Reassessment 1: Patient sleeping (05/29/19 0138) Pain Onset 1: now (05/17/19 1600) Pain Location 1: Chest (05/17/19 1600) Pain Orientation 1: Mid (05/17/19 1600) Pain Description 1: Sore (05/17/19 1600) Pain Intervention(s) 1: Medication (see MAR) (05/17/19 1600) Ambulating Yes Additional Information:  
 
- s/p 1u pRBCs yesterday - hgb up to 8.3 today - PLT down to 17 
- day 21 7+3, day 14 Rydapt  
- rocephin ends 6/5 
- pt ambulated halls during shift. No complaints of pain. Slept well. Vss/afebrile. Shift report given to oncoming nurse at the bedside.  
 
Onel Medeiros RN

## 2019-05-29 NOTE — PROGRESS NOTES
[facility] Inpatient Hematology / Oncology Progress Note Admission Date: 2019 10:53 AM 
Reason for Admission/Hospital Course: Admission for antineoplastic chemotherapy [Z51.11] 24 Hour Events: 
Afebrile, VSS Day 21 post 7+3 Day 14 Midostaurin, completes tomorrow AM 
On Rocephin for strep bacteremia (EOT 6/) Family at bedside ROS: 
Constitutional:  negative for fever, chills, weakness, malaise, fatigue. CV:  negative for chest pain, palpitations, edema. Respiratory: negative for dyspnea, cough, wheezing. GI: negative abdominal pain, diarrhea,  nausea/vomiting. 10 point review of systems is otherwise negative with the exception of the elements mentioned above in the HPI. No Known Allergies OBJECTIVE: 
Patient Vitals for the past 8 hrs: 
 BP Temp Pulse Resp SpO2  
19 0748 147/72 98.1 °F (36.7 °C) 71 18 94 % 19 0311 147/81 98 °F (36.7 °C) 60 18 95 % Temp (24hrs), Av.2 °F (36.8 °C), Min:98 °F (36.7 °C), Max:98.4 °F (36.9 °C) 
 
 0701 -  1900 In: 360 [P.O.:360] Out: - Physical Exam: 
Constitutional: Well developed, well nourished female in no acute distress, sitting comfortably on the hospital bed. HEENT: Normocephalic and atraumatic. Oropharynx is clear, mucous membranes are moist. Extraocular muscles are intact. Sclerae anicteric. Skin Warm and dry. No erythema. No pallor. + eczema like rash to hands. Ring worm appearance, small pea-sized area left thigh. Respiratory Lungs are clear to auscultation bilaterally without wheezes, rales or rhonchi, normal air exchange without accessory muscle use. CVS Normal rate, regular rhythm and normal S1 and S2. No murmurs, gallops, or rubs. Abdomen Soft, nontender and nondistended, normoactive bowel sounds. Neuro Grossly nonfocal with no obvious sensory or motor deficits. MSK Normal range of motion in general. BLE edema improved Psych Appropriate mood and affect. Labs: 
   
Recent Labs  
  05/29/19 0237 05/28/19 
0243 05/27/19 
0440 WBC 0.3* 0.3* 0.2*  
RBC 2.76* 2.34* 2.37* HGB 8.3* 7.0* 7.2* HCT 24.8* 21.3* 22.0*  
MCV 89.9 91.0 92.8 MCH 30.1 29.9 30.4 MCHC 33.5 32.9 32.7 RDW 13.9 13.8 14.2 PLT 17* 24* 28* GRANS  --  4*  --   
LYMPH  --  96*  --   
MONOS  --  0*  --   
EOS  --  0*  --   
BASOS  --  0  --   
IG  --  0  --   
DF MANUAL AUTOMATED MANUAL ANEU  --  0.0*  -- ABL  --  0.2*  --   
ABM  --  0.0*  --   
ALEKSANDR  --  0.0  --   
ABB  --  0.0  --   
AIG  --  0.0  --   
 
  
Recent Labs  
  05/29/19 0237 05/28/19 
0243 05/27/19 
0440  143 142  
K 4.0 3.8 3.9  108* 107 CO2 28 29 30 AGAP 8 6* 5*  
GLU 87 91 95 BUN 10 10 8 CREA 0.63 0.56* 0.57* GFRAA >60 >60 >60 GFRNA >60 >60 >60  
CA 8.8 8.2* 8.4 SGOT 10* 13* 14* AP 84 75 75  
TP 6.1* 5.8* 6.0* ALB 3.2 3.1* 3.0*  
GLOB 2.9 2.7 3.0 AGRAT 1.1* 1.1* 1.0*  
MG 2.4 2.4 2.4 PHOS 3.8* 3.2 3.6 Imaging: XR CHEST SNGL V [[id number]] Collected: 05/18/19 0855 Order Status: Completed Updated: 05/18/19 8645 Narrative:    
EXAM: Single view chest radiograph. INDICATION: 73 years Fever COMPARISON: None. FINDINGS: 
No focal lung opacity. No pneumothorax. No pleural effusion. The heart, 
mediastinum, jhoana, and pulmonary vasculature are within normal limits. No 
evidence of acute osseous abnormality. Right-sided chest port with tip 
terminating in the right atrium. Impression:    
IMPRESSION:  
1. No evidence of pneumonia. Medications: 
Current Facility-Administered Medications Medication Dose Route Frequency  0.9% sodium chloride infusion 250 mL  250 mL IntraVENous PRN  
 terbinafine HCl (LAMISIL) 1 % cream   Topical BID  hydrocortisone-aloe vera 1 % topical cream   Topical BID  
 0.9% sodium chloride infusion 250 mL  250 mL IntraVENous PRN  
 simethicone (MYLICON) tablet 80 mg  80 mg Oral QID PRN  
  cefTRIAXone (ROCEPHIN) 2 g in 0.9% sodium chloride (MBP/ADV) 50 mL  2 g IntraVENous Q24H  
 heparin (porcine) pf 300 Units  300 Units InterCATHeter Q8H  psyllium husk-aspartame (METAMUCIL FIBER) packet 1 Packet  1 Packet Oral BID PRN  
 0.9% sodium chloride infusion 250 mL  250 mL IntraVENous PRN  potassium chloride (K-DUR, KLOR-CON) SR tablet 20 mEq  20 mEq Oral DAILY  chlorhexidine (PERIDEX) 0.12 % mouthwash 15 mL  15 mL Oral BID  midostaurin (RYDAPT) chemo capsule 50 mg (Patient Supplied)  50 mg Oral Q12H  
 sucralfate (CARAFATE) tablet 1 g  1 g Oral AC&HS  pantoprazole (PROTONIX) tablet 40 mg  40 mg Oral ACB  alum-mag hydroxide-simeth (MYLANTA) oral suspension 30 mL  30 mL Oral Q4H PRN  
 magic mouthwash (RENÉ) oral suspension 5 mL  5 mL Oral AC&HS  morphine injection 2 mg  2 mg IntraVENous Q4H PRN  
 HYDROcodone-acetaminophen (NORCO) 5-325 mg per tablet 1 Tab  1 Tab Oral Q6H PRN  psyllium husk-aspartame (METAMUCIL FIBER) packet 1 Packet  1 Packet Oral BID PRN  
 central line flush (saline) syringe 10 mL  10 mL InterCATHeter PRN  
 0.9% sodium chloride infusion 250 mL  250 mL IntraVENous PRN  polyethylene glycol (MIRALAX) packet 17 g  17 g Oral DAILY PRN  
 LORazepam (ATIVAN) injection 0.5 mg  0.5 mg IntraVENous Q6H PRN  
 acetaminophen (TYLENOL) tablet 650 mg  650 mg Oral PRN  
 diphenhydrAMINE (BENADRYL) capsule 25 mg  25 mg Oral PRN  
 acetaminophen/diphenhydrAMINE (TYLENOL PM EXT STR) 500/25 mg   Oral QHS  acyclovir (ZOVIRAX) tablet 400 mg  400 mg Oral BID  allopurinol (ZYLOPRIM) tablet 300 mg  300 mg Oral DAILY  calcium carbonate (OS-CHRIS) tablet 500 mg [elemental]  500 mg Oral TID WITH MEALS  escitalopram oxalate (LEXAPRO) tablet 10 mg  10 mg Oral DAILY  fluconazole (DIFLUCAN) tablet 200 mg  200 mg Oral DAILY  lidocaine-prilocaine (EMLA) 2.5-2.5 % cream   Topical PRN  
 LORazepam (ATIVAN) tablet 1 mg  1 mg Oral QHS  ondansetron (ZOFRAN ODT) tablet 8 mg  8 mg Oral Q8H PRN  pravastatin (PRAVACHOL) tablet 10 mg  10 mg Oral QHS  vit C-E-zinc cit-lutein-zeaxan 50 mg-15 unit- 4.5 mg-2.5 mg chew (OCU-ABDOUL) 1 Tab (Patient Supplied)  1 Tab Oral DAILY  ergocalciferol capsule 50,000 Units  50,000 Units Oral every Wednesday ASSESSMENT: 
 
Problem List  Date Reviewed: 4/30/2019 Codes Class Noted Acute myeloid leukemia not having achieved remission ([facility] ICD-10-CM: C92.00 ICD-9-CM: 205.00  5/9/2019 Admission for antineoplastic chemotherapy ICD-10-CM: Z51.11 ICD-9-CM: V58.11  5/5/2019 AML (acute myeloblastic leukemia) ([facility] ICD-10-CM: C92.00 ICD-9-CM: 205.00  4/28/2019 Weakness generalized ICD-10-CM: R53.1 ICD-9-CM: 780.79  4/28/2019 Pancytopenia ([facility] ICD-10-CM: X56.509 ICD-9-CM: 284.19  4/28/2019 Thrombocytopenia ([facility] ICD-10-CM: D69.6 ICD-9-CM: 287.5  4/27/2019 PLAN: 
AML FLT 3+ 
5/6  BMbx planned for Tuesday then wait for [facility] from port results to determine start date of  cycle one 7 + 3 with Midostaurin day 8-21. Platelets will need to be at least 50k for bx tomorrow 5/7. BMbx today-platelets prior. Also needed prbc today for hgb 6.9. 
5/08 Start 7+3 when afebrile per Dr. Dajuan Dolan. 5/09 Day 1 of 7+3. Monitor TLS labs. 5/10 Day 2 7+3. Tolerating well. 5/13 Day 5 7+3. Midostaurin on the way. 5/16 Day 8 7+3. Day one Rydapt. 5/29 Day 21 7+3, Day 14 Midostaurin - completes tomorrow AM.  Awaiting count recovery, she will need a repeat BMbx after count recovery. Pancytopenia related to chemotherapy 5/16 transfuse per [facility] SOPs 
5/28 PRBCs today 
  
Possible port infection 5/5 Day one vanc/cefe. Follow cultures. Do not use port. May need to have Echo if cultures positive. 5/6 BCNTD. Appears improved today. Continue to monitor. No fevers or other symptoms. 5/7 Day 3 vanc/cefe. Site appears even better today. 5/8 Does not appear infected. Site bruised. 5/9 BCx negative. Per ID okay to start treatment. 5/13 Completed 7 days Vanc/cef. Now on levaquin per ID. 5/20 BC from port +GPC alpha strep-ID consulted 5/21 BC repeated today. ID following for final read on previous cultures to determine if port needs to be removed. ECHO pending.  
5/22 Echo with no vegetation. BC + strep parasanguinis-ID following. Repeat BC NTD 
5/23 ID changed abx to Rocephin x 2 weeks EOT 6/15/19 then repeat BC x 2 post 7 days tx. No port removal needed. 5/29 Con't Rocephin (EOT 6/5) then repeat BCx ~ 7 days after therapy completed x 2, 24 hours apart Neutropenic Fever 05/05 Pan-culture negative. On Vancomycin, Maxipime 05/08 Febrile overnight up to 102.5. Repeat cultures x 1. Continue antibiotics. Likely disease related. Consult ID for clearance. 05/09 BC remain negative. No fevers 48 hours. Continue vanc/cefe. 05/13 now only on LVQ per ID. 5/18 Neutropenic fever - 100.5. Pan-cultures obtained. Chest xray negative. Started on Vanc/Cef. 5/19 Afebrile. Port culture with gram positive cocci in chains, await further studies. Continue current antibiotics. 5/20 port +BC with GPC alpha strep-ID consulted 5/23 ID changed abx to Rocephin x 2 weeks EOT 6/5/19 then repeat BC x 2 post 7 days tx. No port removal needed. 5/29 Remains afebrile. Con't Rocephin (EOT 6/5) then repeat BCx ~ 7 days after therapy completed x 2, 24 hours apart Bradycardia 5/14 EKG with no significant change. Skin Changes 05/26 Hydrocortisone to hands. Anti-fungal cream to area of ring worm. [facility] SOPs Supportive care ACV/Diflucan 
LVQ dc'd while on cefe and vanc Disposition:  Awaiting count recovery. Patient is worried about having repeat BMbx due to bad experience with first one at [facility]/ We will try to arrange for BMbx with sedation in IR when appropriate. Goals and plan of care reviewed with the patient.   All questions answered to the best of our ability. Kumar Skinner NP [facility] [address] [address] Office : [phone] Fax : [phone] Attending Addendum: 
I have personally performed a face to face diagnostic evaluation on this patient. I have reviewed and agree with the care plan as documented by Kumar Skinner, N.P. 36 minutes were spent on patient care, including but not limited to, reviewing the chart and time with the patient and family, more than 50% of the time documented was spent in face-to-face contact with the patient and in the care of the patient on the floor/unit where the patient is located. My findings are as follows: She has AML, appears weak, heart rate regular without murmurs, abdomen is non-tender, bowel sounds are positive, we will continue IV ABX and follow-up her cultures, next week she will undergo a bone marrow biopsy. Shalom Jerry MD 
 
 
[facility]emat[facility]Oncology [address] [address] Office : [phone] Fax : [phone]

## 2019-05-30 LAB
ALBUMIN SERPL-MCNC: 3.3 G/DL (ref 3.2–4.6)
ALBUMIN/GLOB SERPL: 1.1 {RATIO} (ref 1.2–3.5)
ALP SERPL-CCNC: 87 U/L (ref 50–136)
ALT SERPL-CCNC: 25 U/L (ref 12–65)
ANION GAP SERPL CALC-SCNC: 7 MMOL/L (ref 7–16)
AST SERPL-CCNC: 11 U/L (ref 15–37)
BILIRUB SERPL-MCNC: 0.3 MG/DL (ref 0.2–1.1)
BUN SERPL-MCNC: 14 MG/DL (ref 8–23)
CALCIUM SERPL-MCNC: 9.6 MG/DL (ref 8.3–10.4)
CHLORIDE SERPL-SCNC: 105 MMOL/L (ref 98–107)
CO2 SERPL-SCNC: 29 MMOL/L (ref 21–32)
CREAT SERPL-MCNC: 0.65 MG/DL (ref 0.6–1)
DIFFERENTIAL METHOD BLD: ABNORMAL
ERYTHROCYTE [DISTWIDTH] IN BLOOD BY AUTOMATED COUNT: 13.6 % (ref 11.9–14.6)
GLOBULIN SER CALC-MCNC: 3 G/DL (ref 2.3–3.5)
GLUCOSE SERPL-MCNC: 97 MG/DL (ref 65–100)
HCT VFR BLD AUTO: 24.5 % (ref 35.8–46.3)
HGB BLD-MCNC: 8.3 G/DL (ref 11.7–15.4)
LDH SERPL L TO P-CCNC: 174 U/L (ref 110–210)
MAGNESIUM SERPL-MCNC: 2.1 MG/DL (ref 1.8–2.4)
MCH RBC QN AUTO: 30.3 PG (ref 26.1–32.9)
MCHC RBC AUTO-ENTMCNC: 33.9 G/DL (ref 31.4–35)
MCV RBC AUTO: 89.4 FL (ref 79.6–97.8)
NRBC # BLD: 0 K/UL (ref 0–0.2)
PHOSPHATE SERPL-MCNC: 5.2 MG/DL (ref 2.3–3.7)
PLATELET # BLD AUTO: 11 K/UL (ref 150–450)
PLATELET COMMENTS,PCOM: ABNORMAL
PMV BLD AUTO: 10.6 FL (ref 9.4–12.3)
POTASSIUM SERPL-SCNC: 3.9 MMOL/L (ref 3.5–5.1)
PROT SERPL-MCNC: 6.3 G/DL (ref 6.3–8.2)
RBC # BLD AUTO: 2.74 M/UL (ref 4.05–5.2)
RBC MORPH BLD: ABNORMAL
SODIUM SERPL-SCNC: 141 MMOL/L (ref 136–145)
URATE SERPL-MCNC: 2.2 MG/DL (ref 2.6–6)
WBC # BLD AUTO: 0.3 K/UL (ref 4.3–11.1)
WBC MORPH BLD: ABNORMAL

## 2019-05-30 PROCEDURE — 74011250636 HC RX REV CODE- 250/636: Performed by: INTERNAL MEDICINE

## 2019-05-30 PROCEDURE — 74011000250 HC RX REV CODE- 250: Performed by: NURSE PRACTITIONER

## 2019-05-30 PROCEDURE — 65270000029 HC RM PRIVATE

## 2019-05-30 PROCEDURE — 84550 ASSAY OF BLOOD/URIC ACID: CPT

## 2019-05-30 PROCEDURE — 83735 ASSAY OF MAGNESIUM: CPT

## 2019-05-30 PROCEDURE — 84100 ASSAY OF PHOSPHORUS: CPT

## 2019-05-30 PROCEDURE — 74011250637 HC RX REV CODE- 250/637: Performed by: NURSE PRACTITIONER

## 2019-05-30 PROCEDURE — 74011250637 HC RX REV CODE- 250/637: Performed by: INTERNAL MEDICINE

## 2019-05-30 PROCEDURE — 83615 LACTATE (LD) (LDH) ENZYME: CPT

## 2019-05-30 PROCEDURE — 36591 DRAW BLOOD OFF VENOUS DEVICE: CPT

## 2019-05-30 PROCEDURE — 80053 COMPREHEN METABOLIC PANEL: CPT

## 2019-05-30 PROCEDURE — 77030003560 HC NDL HUBR BARD -A

## 2019-05-30 PROCEDURE — 74011000258 HC RX REV CODE- 258: Performed by: INTERNAL MEDICINE

## 2019-05-30 PROCEDURE — 85025 COMPLETE CBC W/AUTO DIFF WBC: CPT

## 2019-05-30 PROCEDURE — 99233 SBSQ HOSP IP/OBS HIGH 50: CPT | Performed by: INTERNAL MEDICINE

## 2019-05-30 RX ADMIN — SUCRALFATE 1 G: 1 TABLET ORAL at 11:50

## 2019-05-30 RX ADMIN — SUCRALFATE 1 G: 1 TABLET ORAL at 07:54

## 2019-05-30 RX ADMIN — CHLORHEXIDINE GLUCONATE 15 ML: 1.2 RINSE ORAL at 17:30

## 2019-05-30 RX ADMIN — SODIUM CHLORIDE, PRESERVATIVE FREE 300 UNITS: 5 INJECTION INTRAVENOUS at 14:29

## 2019-05-30 RX ADMIN — Medication 500 MG: at 16:47

## 2019-05-30 RX ADMIN — ACYCLOVIR 400 MG: 800 TABLET ORAL at 07:55

## 2019-05-30 RX ADMIN — CEFTRIAXONE SODIUM 2 G: 2 INJECTION, POWDER, FOR SOLUTION INTRAMUSCULAR; INTRAVENOUS at 15:22

## 2019-05-30 RX ADMIN — Medication 10 ML: at 05:09

## 2019-05-30 RX ADMIN — Medication 10 ML: at 14:28

## 2019-05-30 RX ADMIN — SODIUM CHLORIDE, PRESERVATIVE FREE 300 UNITS: 5 INJECTION INTRAVENOUS at 05:10

## 2019-05-30 RX ADMIN — Medication 500 MG: at 11:50

## 2019-05-30 RX ADMIN — SODIUM CHLORIDE, PRESERVATIVE FREE 300 UNITS: 5 INJECTION INTRAVENOUS at 21:48

## 2019-05-30 RX ADMIN — Medication 500 MG: at 07:54

## 2019-05-30 RX ADMIN — ACYCLOVIR 400 MG: 800 TABLET ORAL at 17:30

## 2019-05-30 RX ADMIN — CHLORHEXIDINE GLUCONATE 15 ML: 1.2 RINSE ORAL at 07:56

## 2019-05-30 RX ADMIN — FLUCONAZOLE 200 MG: 100 TABLET ORAL at 07:55

## 2019-05-30 RX ADMIN — PANTOPRAZOLE SODIUM 40 MG: 40 TABLET, DELAYED RELEASE ORAL at 07:55

## 2019-05-30 RX ADMIN — LORAZEPAM 1 MG: 1 TABLET ORAL at 21:47

## 2019-05-30 RX ADMIN — SUCRALFATE 1 G: 1 TABLET ORAL at 21:47

## 2019-05-30 RX ADMIN — PRAVASTATIN SODIUM 10 MG: 20 TABLET ORAL at 21:48

## 2019-05-30 RX ADMIN — Medication 5 ML: at 21:48

## 2019-05-30 RX ADMIN — TERBINAFINE HYDROCHLORIDE: 1 CREAM TOPICAL at 17:30

## 2019-05-30 RX ADMIN — ACETAMINOPHEN: 500 TABLET, FILM COATED ORAL at 21:47

## 2019-05-30 RX ADMIN — Medication 5 ML: at 07:56

## 2019-05-30 RX ADMIN — ESCITALOPRAM OXALATE 10 MG: 10 TABLET ORAL at 07:55

## 2019-05-30 RX ADMIN — ALLOPURINOL 300 MG: 300 TABLET ORAL at 07:55

## 2019-05-30 RX ADMIN — TERBINAFINE HYDROCHLORIDE: 1 CREAM TOPICAL at 08:01

## 2019-05-30 RX ADMIN — Medication 5 ML: at 16:47

## 2019-05-30 RX ADMIN — Medication 10 ML: at 21:48

## 2019-05-30 RX ADMIN — HYDROCORTISONE: 1 CREAM TOPICAL at 08:01

## 2019-05-30 RX ADMIN — POTASSIUM CHLORIDE 20 MEQ: 20 TABLET, EXTENDED RELEASE ORAL at 07:55

## 2019-05-30 RX ADMIN — Medication 5 ML: at 11:50

## 2019-05-30 RX ADMIN — SUCRALFATE 1 G: 1 TABLET ORAL at 16:47

## 2019-05-30 RX ADMIN — HYDROCORTISONE: 1 CREAM TOPICAL at 17:30

## 2019-05-30 NOTE — INTERDISCIPLINARY ROUNDS
Interdisciplinary rounds with charge nurse, provider,  and .     Presenting Problem:  AML  Daily Goal: ABX, esophagitis care, monitor counts and vitals  Expected Discharge Date: TBD- BMBx when counts recover  Expected Discharge Needs: none at this time  Patient/Family Concerns addressed

## 2019-05-30 NOTE — PROGRESS NOTES
END OF SHIFT NOTE: 
 
Intake/Output 05/29 1901 - 05/30 0700 In: 240 [P.O.:240] Out: 550 [Urine:550] Voiding: YES Catheter: NO 
Drain:   
 
 
 
 
 
Stool:  0 occurrences. Stool Assessment Stool Color: Brown(per pt) (05/24/19 7964) Stool Appearance: Loose(per pt this AM) (05/29/19 1940) Stool Amount: Medium(per pt) (05/24/19 3587) Stool Source/Status: Rectum (05/24/19 1275) Emesis:  0 occurrences. Emesis Assessment Appearance: Undigested food (05/18/19 0258) Emesis Amount: Small (05/18/19 0258) VITAL SIGNS Patient Vitals for the past 12 hrs: 
 Temp Pulse Resp BP SpO2  
05/29/19 2356 98.6 °F (37 °C) 60 16 160/70 98 % 05/29/19 1920 98.6 °F (37 °C) 72 18 150/75 96 % 05/29/19 1606 98.4 °F (36.9 °C) 71 16 133/74 95 % Pain Assessment Pain 1 Pain Scale 1: Numeric (0 - 10) (05/30/19 0140) Pain Intensity 1: 0 (05/30/19 0140) Patient Stated Pain Goal: 0 (05/30/19 0140) Pain Reassessment 1: Patient sleeping (05/29/19 0138) Pain Onset 1: now (05/17/19 1600) Pain Location 1: Chest (05/17/19 1600) Pain Orientation 1: Mid (05/17/19 1600) Pain Description 1: Sore (05/17/19 1600) Pain Intervention(s) 1: Medication (see MAR) (05/17/19 1600) Ambulating Yes Additional Information:  
Remains afebrile;no bleeding. No new complaints. Last dose Rydapt given this AM. Shift report given to oncoming nurse Tato Rosales at the bedside.  
 
Grey Villa RN

## 2019-05-30 NOTE — PROGRESS NOTES
[facility] Hematology & Oncology Inpatient Hematology / Oncology Progress Note Admission Date: 2019 10:53 AM 
Reason for Admission/Hospital Course: Admission for antineoplastic chemotherapy [Z51.11] 24 Hour Events: 
Afebrile, VSS Day 22 post 7+3 Completed Midostaurin today Awaiting count recovery On Rocephin for strep bacteremia (EOT 6/) Family at bedside ROS: 
Constitutional:  negative for fever, chills, weakness, malaise, fatigue. CV:  negative for chest pain, palpitations, edema. Respiratory: negative for dyspnea, cough, wheezing. GI: negative abdominal pain, diarrhea,  nausea/vomiting. 10 point review of systems is otherwise negative with the exception of the elements mentioned above in the HPI. No Known Allergies OBJECTIVE: 
Patient Vitals for the past 8 hrs: 
 BP Temp Pulse Resp SpO2  
19 0805 142/78 98.1 °F (36.7 °C) 71 17 94 % 19 0330 145/73 98.3 °F (36.8 °C) 67 17 97 % Temp (24hrs), Av.4 °F (36.9 °C), Min:98.1 °F (36.7 °C), Max:98.6 °F (37 °C) No intake/output data recorded. Physical Exam: 
Constitutional: Well developed, well nourished female in no acute distress, sitting comfortably on the hospital bed. HEENT: Normocephalic and atraumatic. Oropharynx is clear, mucous membranes are moist. Extraocular muscles are intact. Sclerae anicteric. Skin Warm and dry. No erythema. No pallor. + eczema like rash to hands. Ring worm appearance, small pea-sized area left thigh. Respiratory Lungs are clear to auscultation bilaterally without wheezes, rales or rhonchi, normal air exchange without accessory muscle use. CVS Normal rate, regular rhythm and normal S1 and S2. No murmurs, gallops, or rubs. Abdomen Soft, nontender and nondistended, normoactive bowel sounds. Neuro Grossly nonfocal with no obvious sensory or motor deficits. MSK Normal range of motion in general. BLE edema improved Psych Appropriate mood and affect. Labs: 
   
Recent Labs 05/30/19 
5094 05/29/19 
3143 05/28/19 
5705 WBC 0.3* 0.3* 0.3*  
RBC 2.74* 2.76* 2.34* HGB 8.3* 8.3* 7.0*  
HCT 24.5* 24.8* 21.3*  
MCV 89.4 89.9 91.0 MCH 30.3 30.1 29.9 MCHC 33.9 33.5 32.9 RDW 13.6 13.9 13.8 PLT 11* 17* 24* GRANS  --   --  4* LYMPH  --   --  96* MONOS  --   --  0* EOS  --   --  0* BASOS  --   --  0 IG  --   --  0  
DF AUTOMATED MANUAL AUTOMATED ANEU  --   --  0.0* ABL  --   --  0.2* ABM  --   --  0.0* ALEKSANDR  --   --  0.0 ABB  --   --  0.0 AIG  --   --  0.0 Recent Labs 05/30/19 
3439 05/29/19 
5932 05/28/19 
4709  142 143  
K 3.9 4.0 3.8  106 108* CO2 29 28 29 AGAP 7 8 6* GLU 97 87 91 BUN 14 10 10 CREA 0.65 0.63 0.56* GFRAA >60 >60 >60 GFRNA >60 >60 >60  
CA 9.6 8.8 8.2* SGOT 11* 10* 13* AP 87 84 75 TP 6.3 6.1* 5.8* ALB 3.3 3.2 3.1*  
GLOB 3.0 2.9 2.7 AGRAT 1.1* 1.1* 1.1*  
MG 2.1 2.4 2.4 PHOS 5.2* 3.8* 3.2 Imaging: XR CHEST SNGL V [[id number]] Collected: 05/18/19 0855 Order Status: Completed Updated: 05/18/19 6165 Narrative:    
EXAM: Single view chest radiograph. INDICATION: 73 years Fever COMPARISON: None. FINDINGS: 
No focal lung opacity. No pneumothorax. No pleural effusion. The heart, 
mediastinum, jhoana, and pulmonary vasculature are within normal limits. No 
evidence of acute osseous abnormality. Right-sided chest port with tip 
terminating in the right atrium. Impression:    
IMPRESSION:  
1. No evidence of pneumonia. Medications: 
Current Facility-Administered Medications Medication Dose Route Frequency  terbinafine HCl (LAMISIL) 1 % cream   Topical BID  hydrocortisone-aloe vera 1 % topical cream   Topical BID  simethicone (MYLICON) tablet 80 mg  80 mg Oral QID PRN  
 cefTRIAXone (ROCEPHIN) 2 g in 0.9% sodium chloride (MBP/ADV) 50 mL  2 g IntraVENous Q24H  heparin (porcine) pf 300 Units  300 Units InterCATHeter Q8H  psyllium husk-aspartame (METAMUCIL FIBER) packet 1 Packet  1 Packet Oral BID PRN  potassium chloride (K-DUR, KLOR-CON) SR tablet 20 mEq  20 mEq Oral DAILY  chlorhexidine (PERIDEX) 0.12 % mouthwash 15 mL  15 mL Oral BID  sucralfate (CARAFATE) tablet 1 g  1 g Oral AC&HS  pantoprazole (PROTONIX) tablet 40 mg  40 mg Oral ACB  alum-mag hydroxide-simeth (MYLANTA) oral suspension 30 mL  30 mL Oral Q4H PRN  
 magic mouthwash (RENÉ) oral suspension 5 mL  5 mL Oral AC&HS  morphine injection 2 mg  2 mg IntraVENous Q4H PRN  
 HYDROcodone-acetaminophen (NORCO) 5-325 mg per tablet 1 Tab  1 Tab Oral Q6H PRN  psyllium husk-aspartame (METAMUCIL FIBER) packet 1 Packet  1 Packet Oral BID PRN  
 central line flush (saline) syringe 10 mL  10 mL InterCATHeter PRN  polyethylene glycol (MIRALAX) packet 17 g  17 g Oral DAILY PRN  
 LORazepam (ATIVAN) injection 0.5 mg  0.5 mg IntraVENous Q6H PRN  
 acetaminophen (TYLENOL) tablet 650 mg  650 mg Oral PRN  
 diphenhydrAMINE (BENADRYL) capsule 25 mg  25 mg Oral PRN  
 acetaminophen/diphenhydrAMINE (TYLENOL PM EXT STR) 500/25 mg   Oral QHS  acyclovir (ZOVIRAX) tablet 400 mg  400 mg Oral BID  allopurinol (ZYLOPRIM) tablet 300 mg  300 mg Oral DAILY  calcium carbonate (OS-CHRIS) tablet 500 mg [elemental]  500 mg Oral TID WITH MEALS  escitalopram oxalate (LEXAPRO) tablet 10 mg  10 mg Oral DAILY  fluconazole (DIFLUCAN) tablet 200 mg  200 mg Oral DAILY  lidocaine-prilocaine (EMLA) 2.5-2.5 % cream   Topical PRN  
 LORazepam (ATIVAN) tablet 1 mg  1 mg Oral QHS  ondansetron (ZOFRAN ODT) tablet 8 mg  8 mg Oral Q8H PRN  pravastatin (PRAVACHOL) tablet 10 mg  10 mg Oral QHS  vit C-E-zinc cit-lutein-zeaxan 50 mg-15 unit- 4.5 mg-2.5 mg chew (OCU-ABDOUL) 1 Tab (Patient Supplied)  1 Tab Oral DAILY  ergocalciferol capsule 50,000 Units  50,000 Units Oral every Wednesday ASSESSMENT: 
 
 Problem List  Date Reviewed: 4/30/2019 Codes Class Noted Acute myeloid leukemia not having achieved remission ([facility] ICD-10-CM: C92.00 ICD-9-CM: 205.00  5/9/2019 Admission for antineoplastic chemotherapy ICD-10-CM: Z51.11 ICD-9-CM: V58.11  5/5/2019 AML (acute myeloblastic leukemia) ([facility] ICD-10-CM: C92.00 ICD-9-CM: 205.00  4/28/2019 Weakness generalized ICD-10-CM: R53.1 ICD-9-CM: 780.79  4/28/2019 Pancytopenia ([facility].) ICD-10-CM: B09.126 ICD-9-CM: 284.19  4/28/2019 Thrombocytopenia ([facility] ICD-10-CM: D69.6 ICD-9-CM: 287.5  4/27/2019 PLAN: 
AML FLT 3+ 
5/6  BMbx planned for Tuesday then wait for [facility] from port results to determine start date of  cycle one 7 + 3 with Midostaurin day 8-21. Platelets will need to be at least 50k for bx tomorrow 5/7. BMbx today-platelets prior. Also needed prbc today for hgb 6.9. 
5/08 Start 7+3 when afebrile per Dr. Bianka Acrher. 5/09 Day 1 of 7+3. Monitor TLS labs. 5/10 Day 2 7+3. Tolerating well. 5/13 Day 5 7+3. Midostaurin on the way. 5/16 Day 8 7+3. Day one Rydapt. 5/30 Day 22 7+3, Completed Midostaurin today. Awaiting count recovery, she will need a repeat BMbx after count recovery. Pancytopenia related to chemotherapy 5/16 transfuse per Alexander SOPs 
5/28 PRBCs today 
  
Possible port infection 5/5 Day one vanc/cefe. Follow cultures. Do not use port. May need to have Echo if cultures positive. 5/6 BCNTD. Appears improved today. Continue to monitor. No fevers or other symptoms. 5/7 Day 3 vanc/cefe. Site appears even better today. 5/8 Does not appear infected. Site bruised. 5/9 BCx negative. Per ID okay to start treatment. 5/13 Completed 7 days Vanc/cef. Now on levaquin per ID. 5/20 BC from port +GPC alpha strep-ID consulted 5/21 BC repeated today. ID following for final read on previous cultures to determine if port needs to be removed. ECHO pending. 5/22 Echo with no vegetation. BC + strep parasanguinis-ID following. Repeat BC NTD 
5/23 ID changed abx to Rocephin x 2 weeks EOT 6/15/19 then repeat BC x 2 post 7 days tx. No port removal needed. 5/29 Con't Rocephin (EOT 6/5) then repeat BCx ~ 7 days after therapy completed x 2, 24 hours apart Neutropenic Fever 05/05 Pan-culture negative. On Vancomycin, Maxipime 05/08 Febrile overnight up to 102.5. Repeat cultures x 1. Continue antibiotics. Likely disease related. Consult ID for clearance. 05/09 BC remain negative. No fevers 48 hours. Continue vanc/cefe. 05/13 now only on LVQ per ID. 5/18 Neutropenic fever - 100.5. Pan-cultures obtained. Chest xray negative. Started on Vanc/Cef. 5/19 Afebrile. Port culture with gram positive cocci in chains, await further studies. Continue current antibiotics. 5/20 port +BC with GPC alpha strep-ID consulted 5/23 ID changed abx to Rocephin x 2 weeks EOT 6/5/19 then repeat BC x 2 post 7 days tx. No port removal needed. 5/30 Remains afebrile. Con't Rocephin (EOT 6/5) then repeat BCx ~ 7 days after therapy completed x 2, 24 hours apart Bradycardia 5/14 EKG with no significant change. Skin Changes 05/26 Hydrocortisone to hands. Anti-fungal cream to area of ring worm. Alexander SOPs Supportive care ACV/Diflucan 
LVQ dc'd while on cefe and vanc Disposition:  Awaiting count recovery. Patient is worried about having repeat BMbx due to bad experience with first one at [facility]/ We will try to arrange for BMbx with sedation in IR when appropriate. Goals and plan of care reviewed with the patient. All questions answered to the best of our ability. Jaswant Durbin NP [facility] Hematology & Oncology [address] [place] [address] Office : [phone] Fax : [phone] Attending Addendum: 
I have personally performed a face to face diagnostic evaluation on this patient. I have reviewed and agree with the care plan as documented by Matilde Huber N.P. 36 minutes were spent on patient care, including but not limited to, reviewing the chart and time with the patient and family, more than 50% of the time documented was spent in face-to-face contact with the patient and in the care of the patient on the floor/unit where the patient is located. My findings are as follows: She has AML, appears anxious, heart rate regular without murmurs, abdomen is non-tender, bowel sounds are positive, we will continue IV ABX and follow-up her blood cultures. Madhu Alonso MD 
 
 
[facility] Hematology/Oncology [address] [place] [address] Office : [phone] Fax : [phone]

## 2019-05-30 NOTE — PROGRESS NOTES
Problem: Falls - Risk of 
Goal: *Absence of Falls Description Document An Dear Fall Risk and appropriate interventions in the flowsheet. Outcome: Progressing Towards Goal 
  
Problem: Discharge Planning Goal: *Discharge to safe environment Outcome: Progressing Towards Goal 
  
Problem: Discharge Planning Goal: *Knowledge of medication management Outcome: Progressing Towards Goal

## 2019-05-30 NOTE — PROGRESS NOTES
END OF SHIFT NOTE:    Intake/Output  05/30 0701 - 05/30 1900  In: 835 [P.O.:835]  Out: 1600 [Urine:1600]   Voiding: YES  Catheter: NO  Drain:              Stool:  0 occurrences. Stool Assessment  Stool Color: Brown(per pt) (05/24/19 0828)  Stool Appearance: Loose(per pt this AM) (05/29/19 1940)  Stool Amount: Medium(per pt) (05/24/19 0828)  Stool Source/Status: Rectum (05/24/19 3662)    Emesis:  0 occurrences. Emesis Assessment  Appearance: Undigested food (05/18/19 0258)  Emesis Amount: Small (05/18/19 0258)    VITAL SIGNS  Patient Vitals for the past 12 hrs:   Temp Pulse Resp BP SpO2   05/30/19 1501 98.7 °F (37.1 °C) 78 17 124/65 97 %   05/30/19 1151 98.4 °F (36.9 °C) 74 17 148/79 97 %   05/30/19 0805 98.1 °F (36.7 °C) 71 17 142/78 94 %       Pain Assessment  Pain 1  Pain Scale 1: Numeric (0 - 10) (05/30/19 1431)  Pain Intensity 1: 0 (05/30/19 1431)  Patient Stated Pain Goal: 0 (05/30/19 1431)  Pain Reassessment 1: Yes (05/30/19 1431)  Pain Onset 1: now (05/17/19 1600)  Pain Location 1: Chest (05/17/19 1600)  Pain Orientation 1: Mid (05/17/19 1600)  Pain Description 1: Sore (05/17/19 1600)  Pain Intervention(s) 1: Medication (see MAR) (05/17/19 1600)    Ambulating  Yes    Additional Information: Pt ambulated in the halls today.  at this bedside. Awaiting count recovery. Hopeful to go home 6/5. No complaints noted at this time. Shift report given to Tawanda Clinton RN oncoming nurse at the bedside.     Fatoumata Akins RN

## 2019-05-31 LAB
ALBUMIN SERPL-MCNC: 3.3 G/DL (ref 3.2–4.6)
ALBUMIN/GLOB SERPL: 1 {RATIO} (ref 1.2–3.5)
ALP SERPL-CCNC: 91 U/L (ref 50–136)
ALT SERPL-CCNC: 25 U/L (ref 12–65)
ANION GAP SERPL CALC-SCNC: 8 MMOL/L (ref 7–16)
AST SERPL-CCNC: 12 U/L (ref 15–37)
BASOPHILS # BLD: 0 K/UL (ref 0–0.2)
BASOPHILS NFR BLD: 0 % (ref 0–2)
BILIRUB SERPL-MCNC: 0.4 MG/DL (ref 0.2–1.1)
BUN SERPL-MCNC: 15 MG/DL (ref 8–23)
CALCIUM SERPL-MCNC: 9.4 MG/DL (ref 8.3–10.4)
CHLORIDE SERPL-SCNC: 104 MMOL/L (ref 98–107)
CO2 SERPL-SCNC: 29 MMOL/L (ref 21–32)
CREAT SERPL-MCNC: 0.67 MG/DL (ref 0.6–1)
DIFFERENTIAL METHOD BLD: ABNORMAL
EOSINOPHIL # BLD: 0 K/UL (ref 0–0.8)
EOSINOPHIL NFR BLD: 0 % (ref 0.5–7.8)
ERYTHROCYTE [DISTWIDTH] IN BLOOD BY AUTOMATED COUNT: 13.5 % (ref 11.9–14.6)
GLOBULIN SER CALC-MCNC: 3.3 G/DL (ref 2.3–3.5)
GLUCOSE SERPL-MCNC: 100 MG/DL (ref 65–100)
HCT VFR BLD AUTO: 25.2 % (ref 35.8–46.3)
HGB BLD-MCNC: 8.3 G/DL (ref 11.7–15.4)
IMM GRANULOCYTES # BLD AUTO: 0 K/UL (ref 0–0.5)
IMM GRANULOCYTES NFR BLD AUTO: 0 % (ref 0–5)
LDH SERPL L TO P-CCNC: 177 U/L (ref 110–210)
LYMPHOCYTES # BLD: 0.3 K/UL (ref 0.5–4.6)
LYMPHOCYTES NFR BLD: 97 % (ref 13–44)
MAGNESIUM SERPL-MCNC: 2.1 MG/DL (ref 1.8–2.4)
MCH RBC QN AUTO: 29.1 PG (ref 26.1–32.9)
MCHC RBC AUTO-ENTMCNC: 32.9 G/DL (ref 31.4–35)
MCV RBC AUTO: 88.4 FL (ref 79.6–97.8)
MONOCYTES # BLD: 0 K/UL (ref 0.1–1.3)
MONOCYTES NFR BLD: 0 % (ref 4–12)
NEUTS SEG # BLD: 0 K/UL (ref 1.7–8.2)
NEUTS SEG NFR BLD: 3 % (ref 43–78)
NRBC # BLD: 0 K/UL (ref 0–0.2)
PHOSPHATE SERPL-MCNC: 4.8 MG/DL (ref 2.3–3.7)
PLATELET # BLD AUTO: 6 K/UL (ref 150–450)
PMV BLD AUTO: 8.6 FL (ref 9.4–12.3)
POTASSIUM SERPL-SCNC: 4.1 MMOL/L (ref 3.5–5.1)
PROT SERPL-MCNC: 6.6 G/DL (ref 6.3–8.2)
RBC # BLD AUTO: 2.85 M/UL (ref 4.05–5.2)
SODIUM SERPL-SCNC: 141 MMOL/L (ref 136–145)
URATE SERPL-MCNC: 2.3 MG/DL (ref 2.6–6)
WBC # BLD AUTO: 0.3 K/UL (ref 4.3–11.1)

## 2019-05-31 PROCEDURE — 36591 DRAW BLOOD OFF VENOUS DEVICE: CPT

## 2019-05-31 PROCEDURE — 74011250637 HC RX REV CODE- 250/637: Performed by: INTERNAL MEDICINE

## 2019-05-31 PROCEDURE — 85025 COMPLETE CBC W/AUTO DIFF WBC: CPT

## 2019-05-31 PROCEDURE — 74011250636 HC RX REV CODE- 250/636: Performed by: INTERNAL MEDICINE

## 2019-05-31 PROCEDURE — 74011000250 HC RX REV CODE- 250: Performed by: NURSE PRACTITIONER

## 2019-05-31 PROCEDURE — 83735 ASSAY OF MAGNESIUM: CPT

## 2019-05-31 PROCEDURE — 84100 ASSAY OF PHOSPHORUS: CPT

## 2019-05-31 PROCEDURE — 80053 COMPREHEN METABOLIC PANEL: CPT

## 2019-05-31 PROCEDURE — P9037 PLATE PHERES LEUKOREDU IRRAD: HCPCS

## 2019-05-31 PROCEDURE — 84550 ASSAY OF BLOOD/URIC ACID: CPT

## 2019-05-31 PROCEDURE — 77030039270 HC TU BLD FLTR CARD -A

## 2019-05-31 PROCEDURE — 36430 TRANSFUSION BLD/BLD COMPNT: CPT

## 2019-05-31 PROCEDURE — 74011000258 HC RX REV CODE- 258: Performed by: INTERNAL MEDICINE

## 2019-05-31 PROCEDURE — 83615 LACTATE (LD) (LDH) ENZYME: CPT

## 2019-05-31 PROCEDURE — 86644 CMV ANTIBODY: CPT

## 2019-05-31 PROCEDURE — 74011250637 HC RX REV CODE- 250/637: Performed by: NURSE PRACTITIONER

## 2019-05-31 PROCEDURE — 99233 SBSQ HOSP IP/OBS HIGH 50: CPT | Performed by: INTERNAL MEDICINE

## 2019-05-31 PROCEDURE — 65270000029 HC RM PRIVATE

## 2019-05-31 RX ORDER — CYANOCOBALAMIN (VITAMIN B-12) 500 MCG
800 TABLET ORAL DAILY
Status: DISCONTINUED | OUTPATIENT
Start: 2019-06-01 | End: 2019-06-07 | Stop reason: HOSPADM

## 2019-05-31 RX ORDER — SODIUM CHLORIDE 9 MG/ML
250 INJECTION, SOLUTION INTRAVENOUS AS NEEDED
Status: DISCONTINUED | OUTPATIENT
Start: 2019-05-31 | End: 2019-06-07 | Stop reason: HOSPADM

## 2019-05-31 RX ADMIN — Medication 10 ML: at 05:19

## 2019-05-31 RX ADMIN — SUCRALFATE 1 G: 1 TABLET ORAL at 12:23

## 2019-05-31 RX ADMIN — SODIUM CHLORIDE, PRESERVATIVE FREE 300 UNITS: 5 INJECTION INTRAVENOUS at 15:12

## 2019-05-31 RX ADMIN — PRAVASTATIN SODIUM 10 MG: 20 TABLET ORAL at 21:57

## 2019-05-31 RX ADMIN — Medication 10 ML: at 15:11

## 2019-05-31 RX ADMIN — ESCITALOPRAM OXALATE 10 MG: 10 TABLET ORAL at 09:25

## 2019-05-31 RX ADMIN — SUCRALFATE 1 G: 1 TABLET ORAL at 09:25

## 2019-05-31 RX ADMIN — ACETAMINOPHEN: 500 TABLET, FILM COATED ORAL at 21:57

## 2019-05-31 RX ADMIN — DIPHENHYDRAMINE HYDROCHLORIDE 25 MG: 25 CAPSULE ORAL at 10:59

## 2019-05-31 RX ADMIN — SODIUM CHLORIDE, PRESERVATIVE FREE 300 UNITS: 5 INJECTION INTRAVENOUS at 21:58

## 2019-05-31 RX ADMIN — ALLOPURINOL 300 MG: 300 TABLET ORAL at 09:25

## 2019-05-31 RX ADMIN — TERBINAFINE HYDROCHLORIDE: 1 CREAM TOPICAL at 21:58

## 2019-05-31 RX ADMIN — SODIUM CHLORIDE, PRESERVATIVE FREE 300 UNITS: 5 INJECTION INTRAVENOUS at 05:18

## 2019-05-31 RX ADMIN — ACYCLOVIR 400 MG: 800 TABLET ORAL at 09:26

## 2019-05-31 RX ADMIN — HYDROCORTISONE: 1 CREAM TOPICAL at 09:26

## 2019-05-31 RX ADMIN — ACYCLOVIR 400 MG: 800 TABLET ORAL at 17:13

## 2019-05-31 RX ADMIN — ALUMINUM HYDROXIDE, MAGNESIUM HYDROXIDE, AND SIMETHICONE 30 ML: 200; 200; 20 SUSPENSION ORAL at 15:52

## 2019-05-31 RX ADMIN — SUCRALFATE 1 G: 1 TABLET ORAL at 21:57

## 2019-05-31 RX ADMIN — Medication 5 ML: at 12:24

## 2019-05-31 RX ADMIN — Medication 10 ML: at 21:58

## 2019-05-31 RX ADMIN — PANTOPRAZOLE SODIUM 40 MG: 40 TABLET, DELAYED RELEASE ORAL at 09:25

## 2019-05-31 RX ADMIN — Medication 500 MG: at 09:25

## 2019-05-31 RX ADMIN — SUCRALFATE 1 G: 1 TABLET ORAL at 17:13

## 2019-05-31 RX ADMIN — LORAZEPAM 1 MG: 1 TABLET ORAL at 21:57

## 2019-05-31 RX ADMIN — TERBINAFINE HYDROCHLORIDE: 1 CREAM TOPICAL at 09:26

## 2019-05-31 RX ADMIN — FLUCONAZOLE 200 MG: 100 TABLET ORAL at 09:25

## 2019-05-31 RX ADMIN — CEFTRIAXONE SODIUM 2 G: 2 INJECTION, POWDER, FOR SOLUTION INTRAMUSCULAR; INTRAVENOUS at 17:13

## 2019-05-31 RX ADMIN — Medication 500 MG: at 17:13

## 2019-05-31 RX ADMIN — Medication 5 ML: at 21:57

## 2019-05-31 RX ADMIN — ACETAMINOPHEN 650 MG: 325 TABLET, FILM COATED ORAL at 10:59

## 2019-05-31 RX ADMIN — POTASSIUM CHLORIDE 20 MEQ: 20 TABLET, EXTENDED RELEASE ORAL at 09:25

## 2019-05-31 RX ADMIN — HYDROCORTISONE: 1 CREAM TOPICAL at 21:57

## 2019-05-31 RX ADMIN — ALUMINUM HYDROXIDE, MAGNESIUM HYDROXIDE, AND SIMETHICONE 30 ML: 200; 200; 20 SUSPENSION ORAL at 03:15

## 2019-05-31 RX ADMIN — CHLORHEXIDINE GLUCONATE 15 ML: 1.2 RINSE ORAL at 09:25

## 2019-05-31 RX ADMIN — Medication 5 ML: at 17:13

## 2019-05-31 RX ADMIN — Medication 500 MG: at 12:23

## 2019-05-31 RX ADMIN — CHLORHEXIDINE GLUCONATE 15 ML: 1.2 RINSE ORAL at 17:13

## 2019-05-31 RX ADMIN — Medication 5 ML: at 09:25

## 2019-05-31 NOTE — PROGRESS NOTES
Problem: Falls - Risk of  Goal: *Absence of Falls  Description  Document Mathieu Persons Fall Risk and appropriate interventions in the flowsheet.   Outcome: Progressing Towards Goal     Problem: Discharge Planning  Goal: *Discharge to safe environment  Outcome: Progressing Towards Goal     Problem: Discharge Planning  Goal: *Knowledge of medication management  Outcome: Progressing Towards Goal     Problem: Discharge Planning  Goal: *Knowledge of discharge instructions  Outcome: Progressing Towards Goal     Problem: Anemia Care Plan (Adult and Pediatric)  Goal: *Labs within defined limits  Outcome: Progressing Towards Goal     Problem: Anemia Care Plan (Adult and Pediatric)  Goal: *Tolerates increased activity  Outcome: Progressing Towards Goal     Problem: Pain  Goal: *Control of Pain  Outcome: Progressing Towards Goal

## 2019-05-31 NOTE — PROGRESS NOTES
[facility] Hematology & Oncology        Inpatient Hematology / Oncology Progress Note      Admission Date: 2019 10:53 AM  Reason for Admission/Hospital Course: Admission for antineoplastic chemotherapy [Z51.11]      24 Hour Events:  Afebrile, VSS   Day 23 post 7+3   Completed Midostaurin yesterday  Awaiting count recovery  On Rocephin for strep bacteremia (EOT )  Family at bedside    ROS:  Constitutional:  negative for fever, chills, weakness, malaise, fatigue. CV:  negative for chest pain, palpitations, edema. Respiratory: negative for dyspnea, cough, wheezing. GI: negative abdominal pain, diarrhea,  nausea/vomiting. 10 point review of systems is otherwise negative with the exception of the elements mentioned above in the HPI. No Known Allergies    OBJECTIVE:  Patient Vitals for the past 8 hrs:   BP Temp Pulse Resp SpO2   19 1108 125/68 97.9 °F (36.6 °C) 83 18 96 %   19 0729 156/78 98.6 °F (37 °C) 80 18 98 %     Temp (24hrs), Av.3 °F (36.8 °C), Min:97.9 °F (36.6 °C), Max:98.7 °F (37.1 °C)     0701 -  1900  In: 240 [P.O.:240]  Out: 400 [Urine:400]    Physical Exam:  Constitutional: Well developed, well nourished female in no acute distress, sitting comfortably on the hospital bed. HEENT: Normocephalic and atraumatic. Oropharynx is clear, mucous membranes are moist. Extraocular muscles are intact. Sclerae anicteric. Skin Warm and dry. No erythema. No pallor. + eczema like rash to hands. Ring worm appearance, small pea-sized area left thigh. Respiratory Lungs are clear to auscultation bilaterally without wheezes, rales or rhonchi, normal air exchange without accessory muscle use. CVS Normal rate, regular rhythm and normal S1 and S2. No murmurs, gallops, or rubs. Abdomen Soft, nontender and nondistended, normoactive bowel sounds. Neuro Grossly nonfocal with no obvious sensory or motor deficits.    MSK Normal range of motion in general. BLE edema improved Psych Appropriate mood and affect. Labs:      Recent Labs     05/31/19 0304 05/30/19 0313 05/29/19 0237   WBC 0.3* 0.3* 0.3*   RBC 2.85* 2.74* 2.76*   HGB 8.3* 8.3* 8.3*   HCT 25.2* 24.5* 24.8*   MCV 88.4 89.4 89.9   MCH 29.1 30.3 30.1   MCHC 32.9 33.9 33.5   RDW 13.5 13.6 13.9   PLT 6* 11* 17*   GRANS 3*  --   --    LYMPH 97*  --   --    MONOS 0*  --   --    EOS 0*  --   --    BASOS 0  --   --    IG 0  --   --    DF AUTOMATED AUTOMATED MANUAL   ANEU 0.0*  --   --    ABL 0.3*  --   --    ABM 0.0*  --   --    ALEKSANDR 0.0  --   --    ABB 0.0  --   --    AIG 0.0  --   --         Recent Labs     05/31/19 0304 05/30/19 0313 05/29/19 0237    141 142   K 4.1 3.9 4.0    105 106   CO2 29 29 28   AGAP 8 7 8    97 87   BUN 15 14 10   CREA 0.67 0.65 0.63   GFRAA >60 >60 >60   GFRNA >60 >60 >60   CA 9.4 9.6 8.8   SGOT 12* 11* 10*   AP 91 87 84   TP 6.6 6.3 6.1*   ALB 3.3 3.3 3.2   GLOB 3.3 3.0 2.9   AGRAT 1.0* 1.1* 1.1*   MG 2.1 2.1 2.4   PHOS 4.8* 5.2* 3.8*         Imaging:  XR CHEST SNGL V [[id number]] Collected: 05/18/19 0855   Order Status: Completed Updated: 05/18/19 0858   Narrative:     EXAM: Single view chest radiograph. INDICATION: 73 years Fever   COMPARISON: None. FINDINGS:  No focal lung opacity. No pneumothorax. No pleural effusion. The heart,  mediastinum, jhoana, and pulmonary vasculature are within normal limits. No  evidence of acute osseous abnormality. Right-sided chest port with tip  terminating in the right atrium. Impression:     IMPRESSION:   1. No evidence of pneumonia.        Medications:  Current Facility-Administered Medications   Medication Dose Route Frequency    0.9% sodium chloride infusion 250 mL  250 mL IntraVENous PRN    terbinafine HCl (LAMISIL) 1 % cream   Topical BID    hydrocortisone-aloe vera 1 % topical cream   Topical BID    simethicone (MYLICON) tablet 80 mg  80 mg Oral QID PRN    cefTRIAXone (ROCEPHIN) 2 g in 0.9% sodium chloride (MBP/ADV) 50 mL  2 g IntraVENous Q24H    heparin (porcine) pf 300 Units  300 Units InterCATHeter Q8H    psyllium husk-aspartame (METAMUCIL FIBER) packet 1 Packet  1 Packet Oral BID PRN    potassium chloride (K-DUR, KLOR-CON) SR tablet 20 mEq  20 mEq Oral DAILY    chlorhexidine (PERIDEX) 0.12 % mouthwash 15 mL  15 mL Oral BID    sucralfate (CARAFATE) tablet 1 g  1 g Oral AC&HS    pantoprazole (PROTONIX) tablet 40 mg  40 mg Oral ACB    alum-mag hydroxide-simeth (MYLANTA) oral suspension 30 mL  30 mL Oral Q4H PRN    magic mouthwash (RENÉ) oral suspension 5 mL  5 mL Oral AC&HS    morphine injection 2 mg  2 mg IntraVENous Q4H PRN    HYDROcodone-acetaminophen (NORCO) 5-325 mg per tablet 1 Tab  1 Tab Oral Q6H PRN    psyllium husk-aspartame (METAMUCIL FIBER) packet 1 Packet  1 Packet Oral BID PRN    central line flush (saline) syringe 10 mL  10 mL InterCATHeter PRN    polyethylene glycol (MIRALAX) packet 17 g  17 g Oral DAILY PRN    LORazepam (ATIVAN) injection 0.5 mg  0.5 mg IntraVENous Q6H PRN    acetaminophen (TYLENOL) tablet 650 mg  650 mg Oral PRN    diphenhydrAMINE (BENADRYL) capsule 25 mg  25 mg Oral PRN    acetaminophen/diphenhydrAMINE (TYLENOL PM EXT STR) 500/25 mg   Oral QHS    acyclovir (ZOVIRAX) tablet 400 mg  400 mg Oral BID    allopurinol (ZYLOPRIM) tablet 300 mg  300 mg Oral DAILY    calcium carbonate (OS-CHRIS) tablet 500 mg [elemental]  500 mg Oral TID WITH MEALS    escitalopram oxalate (LEXAPRO) tablet 10 mg  10 mg Oral DAILY    fluconazole (DIFLUCAN) tablet 200 mg  200 mg Oral DAILY    lidocaine-prilocaine (EMLA) 2.5-2.5 % cream   Topical PRN    LORazepam (ATIVAN) tablet 1 mg  1 mg Oral QHS    ondansetron (ZOFRAN ODT) tablet 8 mg  8 mg Oral Q8H PRN    pravastatin (PRAVACHOL) tablet 10 mg  10 mg Oral QHS    vit C-E-zinc cit-lutein-zeaxan 50 mg-15 unit- 4.5 mg-2.5 mg chew (OCU-ABDOUL) 1 Tab (Patient Supplied)  1 Tab Oral DAILY    ergocalciferol capsule 50,000 Units  50,000 Units Oral every Wednesday ASSESSMENT:    Problem List  Date Reviewed: 4/30/2019          Codes Class Noted    Acute myeloid leukemia not having achieved remission ([facility].) ICD-10-CM: C92.00  ICD-9-CM: 205.00  5/9/2019        Admission for antineoplastic chemotherapy ICD-10-CM: Z51.11  ICD-9-CM: V58.11  5/5/2019        AML (acute myeloblastic leukemia) ([facility] ICD-10-CM: C92.00  ICD-9-CM: 205.00  4/28/2019        Weakness generalized ICD-10-CM: R53.1  ICD-9-CM: 780.79  4/28/2019        Pancytopenia ([facility] ICD-10-CM: D50.219  ICD-9-CM: 284.19  4/28/2019        Thrombocytopenia ([facility]) ICD-10-CM: D69.6  ICD-9-CM: 287.5  4/27/2019                PLAN:  AML FLT 3+  5/6  BMbx planned for Tuesday then wait for BC from port results to determine start date of  cycle one 7 + 3 with Midostaurin day 8-21. Platelets will need to be at least 50k for bx tomorrow  5/7. BMbx today-platelets prior. Also needed prbc today for hgb 6.9.  5/08 Start 7+3 when afebrile per Dr. Dejon Briscoe. 5/09 Day 1 of 7+3. Monitor TLS labs. 5/10 Day 2 7+3. Tolerating well. 5/13 Day 5 7+3. Midostaurin on the way. 5/16 Day 8 7+3. Day one Rydapt. 5/30 Day 22 7+3, Completed Midostaurin today. 5/31 Day 23 7+3. Awaiting count recovery, she will need a repeat BMbx after count recovery. Pancytopenia related to chemotherapy  5/16 transfuse per Alexander SOPs  5/31 Plts today     Possible port infection  5/5 Day one vanc/cefe. Follow cultures. Do not use port. May need to have Echo if cultures positive. 5/6 BCNTD. Appears improved today. Continue to monitor. No fevers or other symptoms. 5/7 Day 3 vanc/cefe. Site appears even better today. 5/8 Does not appear infected. Site bruised. 5/9 BCx negative. Per ID okay to start treatment. 5/13 Completed 7 days Vanc/cef. Now on levaquin per ID.   5/20 BC from port +GPC alpha strep-ID consulted  5/21 BC repeated today. ID following for final read on previous cultures to determine if port needs to be removed.  ECHO pending.   5/22 Echo with no vegetation. BC + strep parasanguinis-ID following. Repeat BC NTD  5/23 ID changed abx to Rocephin x 2 weeks EOT 6/15/19 then repeat BC x 2 post 7 days tx. No port removal needed. 5/29 Con't Rocephin (EOT 6/5) then repeat BCx ~ 7 days after therapy completed x 2, 24 hours apart    Neutropenic Fever  05/05 Pan-culture negative. On Vancomycin, Maxipime  05/08 Febrile overnight up to 102.5. Repeat cultures x 1. Continue antibiotics. Likely disease related. Consult ID for clearance. 05/09 BC remain negative. No fevers 48 hours. Continue vanc/cefe. 05/13 now only on LVQ per ID.  5/18 Neutropenic fever - 100.5. Pan-cultures obtained. Chest xray negative. Started on Vanc/Cef. 5/19 Afebrile. Port culture with gram positive cocci in chains, await further studies. Continue current antibiotics. 5/20 port +BC with GPC alpha strep-ID consulted  5/23 ID changed abx to Rocephin x 2 weeks EOT 6/5/19 then repeat BC x 2 post 7 days tx. No port removal needed. 5/31 Remains afebrile. Con't Rocephin (EOT 6/5) then repeat BCx ~ 7 days after therapy completed x 2, 24 hours apart    Bradycardia  5/14 EKG with no significant change. Skin Changes  05/26 Hydrocortisone to hands. Anti-fungal cream to area of ring worm. Alexander SOPs  Supportive care  ACV/Diflucan  LVQ dc'd while on cefe and vanc    Disposition:  Awaiting count recovery. Patient is worried about having repeat BMbx due to bad experience with first one at [facility]/ We will try to arrange for BMbx with sedation in IR after count recovery. Goals and plan of care reviewed with the patient. All questions answered to the best of our ability. Omaira Ching NP  [facility] Hematology & Oncology  [address] [place] [address], [address] [place] [address]  Office : [phone]  Fax : [phone]        Attending Addendum:  I have personally performed a face to face diagnostic evaluation on this patient.  I have reviewed and agree with the care plan as documented by Robin Mayes Roberta Haddad N.P. 36 minutes were spent on patient care, including but not limited to, reviewing the chart and time with the patient and family, more than 50% of the time documented was spent in face-to-face contact with the patient and in the care of the patient on the floor/unit where the patient is located. My findings are as follows: She has AML, appears fatigued, heart rate regular without murmurs, abdomen is non-tender, bowel sounds are positive, we will transfuse Platelets today and continue IV ABX.               Nadya Vargas MD      [facility] Hematology/Oncology  [address] [place] [address]  Office : [phone]  Fax : [phone] Hemoptysis

## 2019-05-31 NOTE — PROGRESS NOTES
END OF SHIFT NOTE:    Intake/Output  05/30 1901 - 05/31 0700  In: 480 [P.O.:480]  Out: 950 [Urine:950]   Voiding: YES  Catheter: NO  Drain:              Stool:  0 occurrences. Stool Assessment  Stool Color: Brown(per pt) (05/24/19 0828)  Stool Appearance: Loose(per pt;had in AM) (05/30/19 1925)  Stool Amount: Medium(per pt) (05/24/19 0828)  Stool Source/Status: Rectum (05/24/19 6339)    Emesis:  0 occurrences. Emesis Assessment  Appearance: Undigested food (05/18/19 0258)  Emesis Amount: Small (05/18/19 0258)    VITAL SIGNS  Patient Vitals for the past 12 hrs:   Temp Pulse Resp BP SpO2   05/30/19 2303 98.1 °F (36.7 °C) 74 18 149/68 98 %   05/30/19 1930 98.3 °F (36.8 °C) 84 18 146/73 97 %   05/30/19 1501 98.7 °F (37.1 °C) 78 17 124/65 97 %       Pain Assessment  Pain 1  Pain Scale 1: Numeric (0 - 10) (05/31/19 0140)  Pain Intensity 1: 0 (05/31/19 0140)  Patient Stated Pain Goal: 0 (05/31/19 0140)  Pain Reassessment 1: Yes (05/30/19 1431)  Pain Onset 1: now (05/17/19 1600)  Pain Location 1: Chest (05/17/19 1600)  Pain Orientation 1: Mid (05/17/19 1600)  Pain Description 1: Sore (05/17/19 1600)  Pain Intervention(s) 1: Medication (see MAR) (05/17/19 1600)    Ambulating  Yes    Additional Information:   Afebrile;no bleeding. For 1 unit platelet transfusion today per protocol;platelet count 6. Shift report given to oncoming nurse Astrid Molina at the bedside.     Crystal Tena RN

## 2019-05-31 NOTE — PROGRESS NOTES
Chart screened by  for discharge planning. Pt is currently on IV anbx therapy.   Please consult  if any new issues arise

## 2019-06-01 LAB
ABO + RH BLD: NORMAL
ALBUMIN SERPL-MCNC: 3.4 G/DL (ref 3.2–4.6)
ALBUMIN/GLOB SERPL: 1.1 {RATIO} (ref 1.2–3.5)
ALP SERPL-CCNC: 87 U/L (ref 50–136)
ALT SERPL-CCNC: 24 U/L (ref 12–65)
ANION GAP SERPL CALC-SCNC: 8 MMOL/L (ref 7–16)
AST SERPL-CCNC: 12 U/L (ref 15–37)
BILIRUB SERPL-MCNC: 0.3 MG/DL (ref 0.2–1.1)
BLD PROD TYP BPU: NORMAL
BLOOD GROUP ANTIBODIES SERPL: NORMAL
BPU ID: NORMAL
BUN SERPL-MCNC: 14 MG/DL (ref 8–23)
CALCIUM SERPL-MCNC: 8.9 MG/DL (ref 8.3–10.4)
CHLORIDE SERPL-SCNC: 105 MMOL/L (ref 98–107)
CO2 SERPL-SCNC: 28 MMOL/L (ref 21–32)
CREAT SERPL-MCNC: 0.65 MG/DL (ref 0.6–1)
DIFFERENTIAL METHOD BLD: ABNORMAL
ERYTHROCYTE [DISTWIDTH] IN BLOOD BY AUTOMATED COUNT: 13.3 % (ref 11.9–14.6)
GLOBULIN SER CALC-MCNC: 3 G/DL (ref 2.3–3.5)
GLUCOSE SERPL-MCNC: 92 MG/DL (ref 65–100)
HCT VFR BLD AUTO: 23.6 % (ref 35.8–46.3)
HGB BLD-MCNC: 7.8 G/DL (ref 11.7–15.4)
LDH SERPL L TO P-CCNC: 170 U/L (ref 110–210)
MAGNESIUM SERPL-MCNC: 2.3 MG/DL (ref 1.8–2.4)
MCH RBC QN AUTO: 29.3 PG (ref 26.1–32.9)
MCHC RBC AUTO-ENTMCNC: 33.1 G/DL (ref 31.4–35)
MCV RBC AUTO: 88.7 FL (ref 79.6–97.8)
NRBC # BLD: 0 K/UL (ref 0–0.2)
PHOSPHATE SERPL-MCNC: 3.5 MG/DL (ref 2.3–3.7)
PLATELET # BLD AUTO: 41 K/UL (ref 150–450)
PMV BLD AUTO: 10.2 FL (ref 9.4–12.3)
POTASSIUM SERPL-SCNC: 4.2 MMOL/L (ref 3.5–5.1)
PROT SERPL-MCNC: 6.4 G/DL (ref 6.3–8.2)
RBC # BLD AUTO: 2.66 M/UL (ref 4.05–5.2)
SODIUM SERPL-SCNC: 141 MMOL/L (ref 136–145)
SPECIMEN EXP DATE BLD: NORMAL
STATUS OF UNIT,%ST: NORMAL
UNIT DIVISION, %UDIV: 0
URATE SERPL-MCNC: 2.7 MG/DL (ref 2.6–6)
WBC # BLD AUTO: 0.2 K/UL (ref 4.3–11.1)
WBC MORPH BLD: ABNORMAL

## 2019-06-01 PROCEDURE — 74011250637 HC RX REV CODE- 250/637: Performed by: NURSE PRACTITIONER

## 2019-06-01 PROCEDURE — 74011250636 HC RX REV CODE- 250/636: Performed by: INTERNAL MEDICINE

## 2019-06-01 PROCEDURE — 80053 COMPREHEN METABOLIC PANEL: CPT

## 2019-06-01 PROCEDURE — 85025 COMPLETE CBC W/AUTO DIFF WBC: CPT

## 2019-06-01 PROCEDURE — 74011000258 HC RX REV CODE- 258: Performed by: INTERNAL MEDICINE

## 2019-06-01 PROCEDURE — 65270000029 HC RM PRIVATE

## 2019-06-01 PROCEDURE — 84550 ASSAY OF BLOOD/URIC ACID: CPT

## 2019-06-01 PROCEDURE — 74011250637 HC RX REV CODE- 250/637: Performed by: INTERNAL MEDICINE

## 2019-06-01 PROCEDURE — 86900 BLOOD TYPING SEROLOGIC ABO: CPT

## 2019-06-01 PROCEDURE — 36591 DRAW BLOOD OFF VENOUS DEVICE: CPT

## 2019-06-01 PROCEDURE — 83735 ASSAY OF MAGNESIUM: CPT

## 2019-06-01 PROCEDURE — 83615 LACTATE (LD) (LDH) ENZYME: CPT

## 2019-06-01 PROCEDURE — 99232 SBSQ HOSP IP/OBS MODERATE 35: CPT | Performed by: INTERNAL MEDICINE

## 2019-06-01 PROCEDURE — 74011000250 HC RX REV CODE- 250: Performed by: NURSE PRACTITIONER

## 2019-06-01 PROCEDURE — 84100 ASSAY OF PHOSPHORUS: CPT

## 2019-06-01 RX ORDER — POLYETHYLENE GLYCOL 3350 17 G/17G
17 POWDER, FOR SOLUTION ORAL
Status: DISCONTINUED | OUTPATIENT
Start: 2019-06-01 | End: 2019-06-06

## 2019-06-01 RX ADMIN — Medication 10 ML: at 21:56

## 2019-06-01 RX ADMIN — SUCRALFATE 1 G: 1 TABLET ORAL at 11:57

## 2019-06-01 RX ADMIN — Medication 800 UNITS: at 08:11

## 2019-06-01 RX ADMIN — Medication 5 ML: at 11:57

## 2019-06-01 RX ADMIN — SODIUM CHLORIDE, PRESERVATIVE FREE 300 UNITS: 5 INJECTION INTRAVENOUS at 14:15

## 2019-06-01 RX ADMIN — ACYCLOVIR 400 MG: 800 TABLET ORAL at 08:10

## 2019-06-01 RX ADMIN — HYDROCORTISONE: 1 CREAM TOPICAL at 08:15

## 2019-06-01 RX ADMIN — CHLORHEXIDINE GLUCONATE 15 ML: 1.2 RINSE ORAL at 08:11

## 2019-06-01 RX ADMIN — SUCRALFATE 1 G: 1 TABLET ORAL at 08:11

## 2019-06-01 RX ADMIN — SODIUM CHLORIDE, PRESERVATIVE FREE 300 UNITS: 5 INJECTION INTRAVENOUS at 21:56

## 2019-06-01 RX ADMIN — CHLORHEXIDINE GLUCONATE 15 ML: 1.2 RINSE ORAL at 17:33

## 2019-06-01 RX ADMIN — SUCRALFATE 1 G: 1 TABLET ORAL at 16:00

## 2019-06-01 RX ADMIN — Medication 500 MG: at 11:57

## 2019-06-01 RX ADMIN — ESCITALOPRAM OXALATE 10 MG: 10 TABLET ORAL at 08:11

## 2019-06-01 RX ADMIN — SUCRALFATE 1 G: 1 TABLET ORAL at 21:56

## 2019-06-01 RX ADMIN — PRAVASTATIN SODIUM 10 MG: 20 TABLET ORAL at 21:56

## 2019-06-01 RX ADMIN — CEFTRIAXONE SODIUM 2 G: 2 INJECTION, POWDER, FOR SOLUTION INTRAMUSCULAR; INTRAVENOUS at 16:00

## 2019-06-01 RX ADMIN — Medication 500 MG: at 17:33

## 2019-06-01 RX ADMIN — POTASSIUM CHLORIDE 20 MEQ: 20 TABLET, EXTENDED RELEASE ORAL at 08:11

## 2019-06-01 RX ADMIN — ACETAMINOPHEN: 500 TABLET, FILM COATED ORAL at 21:56

## 2019-06-01 RX ADMIN — TERBINAFINE HYDROCHLORIDE: 1 CREAM TOPICAL at 08:15

## 2019-06-01 RX ADMIN — Medication 10 ML: at 14:15

## 2019-06-01 RX ADMIN — Medication 10 ML: at 05:11

## 2019-06-01 RX ADMIN — Medication 5 ML: at 08:12

## 2019-06-01 RX ADMIN — Medication 5 ML: at 21:56

## 2019-06-01 RX ADMIN — PANTOPRAZOLE SODIUM 40 MG: 40 TABLET, DELAYED RELEASE ORAL at 08:11

## 2019-06-01 RX ADMIN — HYDROCORTISONE: 1 CREAM TOPICAL at 18:00

## 2019-06-01 RX ADMIN — ALLOPURINOL 300 MG: 300 TABLET ORAL at 08:11

## 2019-06-01 RX ADMIN — Medication 5 ML: at 16:00

## 2019-06-01 RX ADMIN — LORAZEPAM 1 MG: 1 TABLET ORAL at 21:56

## 2019-06-01 RX ADMIN — Medication 500 MG: at 08:11

## 2019-06-01 RX ADMIN — ACYCLOVIR 400 MG: 800 TABLET ORAL at 17:33

## 2019-06-01 RX ADMIN — SODIUM CHLORIDE, PRESERVATIVE FREE 300 UNITS: 5 INJECTION INTRAVENOUS at 05:12

## 2019-06-01 RX ADMIN — TERBINAFINE HYDROCHLORIDE: 1 CREAM TOPICAL at 18:00

## 2019-06-01 RX ADMIN — FLUCONAZOLE 200 MG: 100 TABLET ORAL at 08:11

## 2019-06-01 NOTE — PROGRESS NOTES
END OF SHIFT NOTE:    Intake/Output  05/31 1901 - 06/01 0700  In: 360 [P.O.:360]  Out: 1200 [Urine:1200]   Voiding: YES  Catheter: NO  Drain:              Stool:  0 occurrences. Stool Assessment  Stool Color: Brown(per pt) (05/24/19 0828)  Stool Appearance: Loose(per pt;had in AM) (05/30/19 1925)  Stool Amount: Medium(per pt) (05/24/19 0828)  Stool Source/Status: Rectum (05/24/19 7270)    Emesis:  0 occurrences. Emesis Assessment  Appearance: Undigested food (05/18/19 0258)  Emesis Amount: Small (05/18/19 0258)    VITAL SIGNS  Patient Vitals for the past 12 hrs:   Temp Pulse Resp BP SpO2   06/01/19 0414 98.3 °F (36.8 °C) 69 18 150/66 96 %   05/31/19 2330 98.2 °F (36.8 °C) 74 18 148/65 95 %   05/31/19 1914 98.1 °F (36.7 °C) 89 18 136/64 98 %       Pain Assessment  Pain 1  Pain Scale 1: Numeric (0 - 10) (06/01/19 0200)  Pain Intensity 1: 0 (06/01/19 0200)  Patient Stated Pain Goal: 0 (06/01/19 0200)  Pain Reassessment 1: Yes (05/31/19 1437)  Pain Onset 1: now (05/17/19 1600)  Pain Location 1: Chest (05/17/19 1600)  Pain Orientation 1: Mid (05/17/19 1600)  Pain Description 1: Sore (05/17/19 1600)  Pain Intervention(s) 1: Medication (see MAR) (05/17/19 1600)    Ambulating  Yes    Additional Information:   No new complaints overnight. Remains afebrile w/ no bleeding noted or reported. Shift report given to oncoming nurse Andrey Kim at the bedside.     Luis Armando Anderson RN

## 2019-06-01 NOTE — PROGRESS NOTES
Problem: Falls - Risk of  Goal: *Absence of Falls  Outcome: Progressing Towards Goal     Problem: Discharge Planning  Goal: *Discharge to safe environment  Outcome: Progressing Towards Goal

## 2019-06-01 NOTE — PROGRESS NOTES
END OF SHIFT NOTE:    Intake/Output  No intake/output data recorded. Voiding: YES  Catheter: NO  Drain:              Stool:  1 occurrences. Stool Assessment  Stool Color: Brown(per pt) (05/24/19 0828)  Stool Appearance: Formed (06/01/19 0726)  Stool Amount: Medium(per pt) (05/24/19 0828)  Stool Source/Status: Rectum (05/24/19 6744)    Emesis:  0 occurrences. Emesis Assessment  Appearance: Undigested food (05/18/19 0258)  Emesis Amount: Small (05/18/19 0258)    VITAL SIGNS  Patient Vitals for the past 12 hrs:   Temp Pulse Resp BP SpO2   06/01/19 1519 98.7 °F (37.1 °C) 82 18 131/64 96 %   06/01/19 1121 98.4 °F (36.9 °C) 86 18 121/84 96 %       Pain Assessment  Pain 1  Pain Scale 1: Numeric (0 - 10) (06/01/19 1507)  Pain Intensity 1: 0 (06/01/19 1507)  Patient Stated Pain Goal: 0 (06/01/19 0200)  Pain Reassessment 1: Yes (05/31/19 1437)  Pain Onset 1: now (05/17/19 1600)  Pain Location 1: Chest (05/17/19 1600)  Pain Orientation 1: Mid (05/17/19 1600)  Pain Description 1: Sore (05/17/19 1600)  Pain Intervention(s) 1: Medication (see MAR) (05/17/19 1600)    Ambulating  Yes    Additional Information: Pt is in good spirits and has ambulated frequently today. No concerns or complaints voiced. Shift report given to oncoming nurse, Ama Stringer, at the bedside.     Job Andrea RN

## 2019-06-01 NOTE — PROGRESS NOTES
[facility]        Inpatient Hematology / Oncology Progress Note      Admission Date: 2019 10:53 AM  Reason for Admission/Hospital Course: Admission for antineoplastic chemotherapy [Z51.11]      24 Hour Events:  Afebrile, VSS   Day 24 post 7+3   Completed Midostaurin on   Awaiting count recovery  On Rocephin for strep bacteremia (EOT )  Family at bedside    ROS:  Constitutional:  negative for fever, chills, weakness, malaise, fatigue. CV:  negative for chest pain, palpitations, edema. Respiratory: negative for dyspnea, cough, wheezing. GI: negative abdominal pain, diarrhea,  nausea/vomiting. 10 point review of systems is otherwise negative with the exception of the elements mentioned above in the HPI. No Known Allergies    OBJECTIVE:  Patient Vitals for the past 8 hrs:   BP Temp Pulse Resp SpO2   19 1121 121/84 98.4 °F (36.9 °C) 86 18 96 %   19 0726 127/59 97.5 °F (36.4 °C) 82 18 98 %   19 0414 150/66 98.3 °F (36.8 °C) 69 18 96 %     Temp (24hrs), Av.1 °F (36.7 °C), Min:97.5 °F (36.4 °C), Max:98.4 °F (36.9 °C)     07 -  1900  In: 240 [P.O.:240]  Out: 800 [Urine:800]    Physical Exam:  Constitutional: Well developed, well nourished female in no acute distress, sitting comfortably on the hospital bed. HEENT: Normocephalic and atraumatic. Oropharynx is clear, mucous membranes are moist. Extraocular muscles are intact. Sclerae anicteric. Skin Warm and dry. No erythema. No pallor. + eczema like rash to hands. Ring worm appearance, small pea-sized area left thigh. Respiratory Lungs are clear to auscultation bilaterally without wheezes, rales or rhonchi, normal air exchange without accessory muscle use. CVS Normal rate, regular rhythm and normal S1 and S2. No murmurs, gallops, or rubs. Abdomen Soft, nontender and nondistended, normoactive bowel sounds. Neuro Grossly nonfocal with no obvious sensory or motor deficits.    MSK Normal range of motion in general. BLE edema improved   Psych Appropriate mood and affect. Labs:      Recent Labs     06/01/19 0408 05/31/19  0304 05/30/19  0313   WBC 0.2* 0.3* 0.3*   RBC 2.66* 2.85* 2.74*   HGB 7.8* 8.3* 8.3*   HCT 23.6* 25.2* 24.5*   MCV 88.7 88.4 89.4   MCH 29.3 29.1 30.3   MCHC 33.1 32.9 33.9   RDW 13.3 13.5 13.6   PLT 41* 6* 11*   GRANS  --  3*  --    LYMPH  --  97*  --    MONOS  --  0*  --    EOS  --  0*  --    BASOS  --  0  --    IG  --  0  --    DF MANUAL AUTOMATED AUTOMATED   ANEU  --  0.0*  --    ABL  --  0.3*  --    ABM  --  0.0*  --    ALEKSANDR  --  0.0  --    ABB  --  0.0  --    AIG  --  0.0  --         Recent Labs     06/01/19 0408 05/31/19  0304 05/30/19  0313    141 141   K 4.2 4.1 3.9    104 105   CO2 28 29 29   AGAP 8 8 7   GLU 92 100 97   BUN 14 15 14   CREA 0.65 0.67 0.65   GFRAA >60 >60 >60   GFRNA >60 >60 >60   CA 8.9 9.4 9.6   SGOT 12* 12* 11*   AP 87 91 87   TP 6.4 6.6 6.3   ALB 3.4 3.3 3.3   GLOB 3.0 3.3 3.0   AGRAT 1.1* 1.0* 1.1*   MG 2.3 2.1 2.1   PHOS 3.5 4.8* 5.2*         Imaging:  XR CHEST SNGL V [[id number]] Collected: 05/18/19 0855   Order Status: Completed Updated: 05/18/19 0858   Narrative:     EXAM: Single view chest radiograph. INDICATION: 73 years Fever   COMPARISON: None. FINDINGS:  No focal lung opacity. No pneumothorax. No pleural effusion. The heart,  mediastinum, jhoana, and pulmonary vasculature are within normal limits. No  evidence of acute osseous abnormality. Right-sided chest port with tip  terminating in the right atrium. Impression:     IMPRESSION:   1. No evidence of pneumonia.        Medications:  Current Facility-Administered Medications   Medication Dose Route Frequency    polyethylene glycol (MIRALAX) packet 17 g  17 g Oral DAILY PRN    0.9% sodium chloride infusion 250 mL  250 mL IntraVENous PRN    cholecalciferol (VITAMIN D3) tablet 800 Units  800 Units Oral DAILY    terbinafine HCl (LAMISIL) 1 % cream   Topical BID    hydrocortisone-aloe vera 1 % topical cream   Topical BID    simethicone (MYLICON) tablet 80 mg  80 mg Oral QID PRN    cefTRIAXone (ROCEPHIN) 2 g in 0.9% sodium chloride (MBP/ADV) 50 mL  2 g IntraVENous Q24H    heparin (porcine) pf 300 Units  300 Units InterCATHeter Q8H    psyllium husk-aspartame (METAMUCIL FIBER) packet 1 Packet  1 Packet Oral BID PRN    potassium chloride (K-DUR, KLOR-CON) SR tablet 20 mEq  20 mEq Oral DAILY    chlorhexidine (PERIDEX) 0.12 % mouthwash 15 mL  15 mL Oral BID    sucralfate (CARAFATE) tablet 1 g  1 g Oral AC&HS    pantoprazole (PROTONIX) tablet 40 mg  40 mg Oral ACB    alum-mag hydroxide-simeth (MYLANTA) oral suspension 30 mL  30 mL Oral Q4H PRN    magic mouthwash (RENÉ) oral suspension 5 mL  5 mL Oral AC&HS    morphine injection 2 mg  2 mg IntraVENous Q4H PRN    HYDROcodone-acetaminophen (NORCO) 5-325 mg per tablet 1 Tab  1 Tab Oral Q6H PRN    psyllium husk-aspartame (METAMUCIL FIBER) packet 1 Packet  1 Packet Oral BID PRN    central line flush (saline) syringe 10 mL  10 mL InterCATHeter PRN    polyethylene glycol (MIRALAX) packet 17 g  17 g Oral DAILY PRN    LORazepam (ATIVAN) injection 0.5 mg  0.5 mg IntraVENous Q6H PRN    acetaminophen (TYLENOL) tablet 650 mg  650 mg Oral PRN    diphenhydrAMINE (BENADRYL) capsule 25 mg  25 mg Oral PRN    acetaminophen/diphenhydrAMINE (TYLENOL PM EXT STR) 500/25 mg   Oral QHS    acyclovir (ZOVIRAX) tablet 400 mg  400 mg Oral BID    allopurinol (ZYLOPRIM) tablet 300 mg  300 mg Oral DAILY    calcium carbonate (OS-CHRIS) tablet 500 mg [elemental]  500 mg Oral TID WITH MEALS    escitalopram oxalate (LEXAPRO) tablet 10 mg  10 mg Oral DAILY    fluconazole (DIFLUCAN) tablet 200 mg  200 mg Oral DAILY    lidocaine-prilocaine (EMLA) 2.5-2.5 % cream   Topical PRN    LORazepam (ATIVAN) tablet 1 mg  1 mg Oral QHS    ondansetron (ZOFRAN ODT) tablet 8 mg  8 mg Oral Q8H PRN    pravastatin (PRAVACHOL) tablet 10 mg  10 mg Oral QHS    vit C-E-zinc cit-lutein-zeaxan 50 mg-15 unit- 4.5 mg-2.5 mg chew (OCU-ABDOUL) 1 Tab (Patient Supplied)  1 Tab Oral DAILY         ASSESSMENT:    Problem List  Date Reviewed: 4/30/2019          Codes Class Noted    Acute myeloid leukemia not having achieved remission ([facility] ICD-10-CM: C92.00  ICD-9-CM: 205.00  5/9/2019        Admission for antineoplastic chemotherapy ICD-10-CM: Z51.11  ICD-9-CM: V58.11  5/5/2019        AML (acute myeloblastic leukemia) ([facility] ICD-10-CM: C92.00  ICD-9-CM: 205.00  4/28/2019        Weakness generalized ICD-10-CM: R53.1  ICD-9-CM: 780.79  4/28/2019        Pancytopenia ([facility] ICD-10-CM: U50.252  ICD-9-CM: 284.19  4/28/2019        Thrombocytopenia (H[facility]) ICD-10-CM: D69.6  ICD-9-CM: 287.5  4/27/2019                PLAN:  AML FLT 3+  5/6  BMbx planned for Tuesday then wait for BC from port results to determine start date of  cycle one 7 + 3 with Midostaurin day 8-21. Platelets will need to be at least 50k for bx tomorrow  5/7. BMbx today-platelets prior. Also needed prbc today for hgb 6.9.  5/08 Start 7+3 when afebrile per Dr. Irena Santillan. 5/09 Day 1 of 7+3. Monitor TLS labs. 5/10 Day 2 7+3. Tolerating well. 5/13 Day 5 7+3. Midostaurin on the way. 5/16 Day 8 7+3. Day one Rydapt. 5/30 Day 22 7+3, Completed Midostaurin today. 6/1 Day 24 7+3. Awaiting count recovery, she will need a repeat BMbx after count recovery. Pancytopenia related to chemotherapy  5/16 transfuse per [facility] SOPs  5/31 Plts today     Possible port infection  5/5 Day one vanc/cefe. Follow cultures. Do not use port. May need to have Echo if cultures positive. 5/6 BCNTD. Appears improved today. Continue to monitor. No fevers or other symptoms. 5/7 Day 3 vanc/cefe. Site appears even better today. 5/8 Does not appear infected. Site bruised. 5/9 BCx negative. Per ID okay to start treatment. 5/13 Completed 7 days Vanc/cef. Now on levaquin per ID.   5/20 BC from port +GPC alpha strep-ID consulted  5/21 BC repeated today.  ID following for final read on previous cultures to determine if port needs to be removed. ECHO pending.   5/22 Echo with no vegetation. BC + strep parasanguinis-ID following. Repeat BC NTD  5/23 ID changed abx to Rocephin x 2 weeks EOT 6/15/19 then repeat BC x 2 post 7 days tx. No port removal needed. 5/29 Con't Rocephin (EOT 6/5) then repeat BCx ~ 7 days after therapy completed x 2, 24 hours apart    Neutropenic Fever  05/05 Pan-culture negative. On Vancomycin, Maxipime  05/08 Febrile overnight up to 102.5. Repeat cultures x 1. Continue antibiotics. Likely disease related. Consult ID for clearance. 05/09 BC remain negative. No fevers 48 hours. Continue vanc/cefe. 05/13 now only on LVQ per ID.  5/18 Neutropenic fever - 100.5. Pan-cultures obtained. Chest xray negative. Started on Vanc/Cef. 5/19 Afebrile. Port culture with gram positive cocci in chains, await further studies. Continue current antibiotics. 5/20 port +BC with GPC alpha strep-ID consulted  5/23 ID changed abx to Rocephin x 2 weeks EOT 6/5/19 then repeat BC x 2 post 7 days tx. No port removal needed. 5/31 Remains afebrile. Con't Rocephin (EOT 6/5) then repeat BCx ~ 7 days after therapy completed x 2, 24 hours apart    Bradycardia  5/14 EKG with no significant change. Skin Changes  05/26 Hydrocortisone to hands. Anti-fungal cream to area of ring worm. [facility] SOPs  Supportive care  ACV/Diflucan  LVQ dc'd while on cefe and vanc    Disposition:  Awaiting count recovery. Patient is worried about having repeat BMbx due to bad experience with first one at [facility]/ We will try to arrange for BMbx with sedation in IR after count recovery. Goals and plan of care reviewed with the patient. All questions answered to the best of our ability.       Kmuar Skinner NP  [facility]  [address] [place] [address], [address] [place] [address]  Office : [phone]  Fax : [phone]        Attending Addendum:  I have personally performed a face to face diagnostic evaluation on this patient. I have reviewed and agree with the care plan as documented by Pema Crowell N.P. My findings are as follows: She has AML, appears fatigued, heart rate regular without murmurs, abdomen is non-tender, bowel sounds are positive, we will continue IV ABX and follow-up her cultures.               Bobo Harris MD      [facility]  [address] [place] [address]  Office : [phone]  Fax : [phone]

## 2019-06-01 NOTE — PROGRESS NOTES
Interdisciplinary rounds with charge nurse, provider,  and .     Presenting Problem: AML  Daily Goal: ABX, esophagitis care, monitor counts and vitals  Expected Discharge Date: TBD- BMBx with sedation when counts recover  Expected Discharge Needs: none at this time  Patient/Family Concerns addressed

## 2019-06-02 LAB
ALBUMIN SERPL-MCNC: 3.4 G/DL (ref 3.2–4.6)
ALBUMIN/GLOB SERPL: 1 {RATIO} (ref 1.2–3.5)
ALP SERPL-CCNC: 98 U/L (ref 50–136)
ALT SERPL-CCNC: 23 U/L (ref 12–65)
ANION GAP SERPL CALC-SCNC: 8 MMOL/L (ref 7–16)
AST SERPL-CCNC: 13 U/L (ref 15–37)
BASOPHILS # BLD: 0 K/UL (ref 0–0.2)
BASOPHILS NFR BLD: 0 % (ref 0–2)
BILIRUB SERPL-MCNC: 0.3 MG/DL (ref 0.2–1.1)
BUN SERPL-MCNC: 14 MG/DL (ref 8–23)
CALCIUM SERPL-MCNC: 9.5 MG/DL (ref 8.3–10.4)
CHLORIDE SERPL-SCNC: 106 MMOL/L (ref 98–107)
CO2 SERPL-SCNC: 27 MMOL/L (ref 21–32)
CREAT SERPL-MCNC: 0.69 MG/DL (ref 0.6–1)
DIFFERENTIAL METHOD BLD: ABNORMAL
EOSINOPHIL # BLD: 0 K/UL (ref 0–0.8)
EOSINOPHIL NFR BLD: 0 % (ref 0.5–7.8)
ERYTHROCYTE [DISTWIDTH] IN BLOOD BY AUTOMATED COUNT: 13.2 % (ref 11.9–14.6)
GLOBULIN SER CALC-MCNC: 3.4 G/DL (ref 2.3–3.5)
GLUCOSE SERPL-MCNC: 97 MG/DL (ref 65–100)
HCT VFR BLD AUTO: 23.8 % (ref 35.8–46.3)
HGB BLD-MCNC: 8.1 G/DL (ref 11.7–15.4)
IMM GRANULOCYTES # BLD AUTO: 0 K/UL (ref 0–0.5)
IMM GRANULOCYTES NFR BLD AUTO: 0 % (ref 0–5)
LDH SERPL L TO P-CCNC: 179 U/L (ref 110–210)
LYMPHOCYTES # BLD: 0.3 K/UL (ref 0.5–4.6)
LYMPHOCYTES NFR BLD: 93 % (ref 13–44)
MAGNESIUM SERPL-MCNC: 2.5 MG/DL (ref 1.8–2.4)
MCH RBC QN AUTO: 30.3 PG (ref 26.1–32.9)
MCHC RBC AUTO-ENTMCNC: 34 G/DL (ref 31.4–35)
MCV RBC AUTO: 89.1 FL (ref 79.6–97.8)
MONOCYTES # BLD: 0 K/UL (ref 0.1–1.3)
MONOCYTES NFR BLD: 0 % (ref 4–12)
NEUTS SEG # BLD: 0 K/UL (ref 1.7–8.2)
NEUTS SEG NFR BLD: 7 % (ref 43–78)
NRBC # BLD: 0 K/UL (ref 0–0.2)
PHOSPHATE SERPL-MCNC: 3.6 MG/DL (ref 2.3–3.7)
PLATELET # BLD AUTO: 33 K/UL (ref 150–450)
PMV BLD AUTO: 10.4 FL (ref 9.4–12.3)
POTASSIUM SERPL-SCNC: 4.2 MMOL/L (ref 3.5–5.1)
PROT SERPL-MCNC: 6.8 G/DL (ref 6.3–8.2)
RBC # BLD AUTO: 2.67 M/UL (ref 4.05–5.2)
SODIUM SERPL-SCNC: 141 MMOL/L (ref 136–145)
URATE SERPL-MCNC: 2.8 MG/DL (ref 2.6–6)
WBC # BLD AUTO: 0.3 K/UL (ref 4.3–11.1)

## 2019-06-02 PROCEDURE — 83735 ASSAY OF MAGNESIUM: CPT

## 2019-06-02 PROCEDURE — 74011250636 HC RX REV CODE- 250/636: Performed by: INTERNAL MEDICINE

## 2019-06-02 PROCEDURE — 74011250637 HC RX REV CODE- 250/637: Performed by: NURSE PRACTITIONER

## 2019-06-02 PROCEDURE — 65270000029 HC RM PRIVATE

## 2019-06-02 PROCEDURE — 80053 COMPREHEN METABOLIC PANEL: CPT

## 2019-06-02 PROCEDURE — 84100 ASSAY OF PHOSPHORUS: CPT

## 2019-06-02 PROCEDURE — 36591 DRAW BLOOD OFF VENOUS DEVICE: CPT

## 2019-06-02 PROCEDURE — 84550 ASSAY OF BLOOD/URIC ACID: CPT

## 2019-06-02 PROCEDURE — 74011000258 HC RX REV CODE- 258: Performed by: INTERNAL MEDICINE

## 2019-06-02 PROCEDURE — 74011000250 HC RX REV CODE- 250: Performed by: NURSE PRACTITIONER

## 2019-06-02 PROCEDURE — 99232 SBSQ HOSP IP/OBS MODERATE 35: CPT | Performed by: INTERNAL MEDICINE

## 2019-06-02 PROCEDURE — 74011250637 HC RX REV CODE- 250/637: Performed by: INTERNAL MEDICINE

## 2019-06-02 PROCEDURE — 85025 COMPLETE CBC W/AUTO DIFF WBC: CPT

## 2019-06-02 PROCEDURE — 83615 LACTATE (LD) (LDH) ENZYME: CPT

## 2019-06-02 RX ADMIN — Medication 500 MG: at 08:36

## 2019-06-02 RX ADMIN — TERBINAFINE HYDROCHLORIDE: 1 CREAM TOPICAL at 17:31

## 2019-06-02 RX ADMIN — ACYCLOVIR 400 MG: 800 TABLET ORAL at 17:30

## 2019-06-02 RX ADMIN — SODIUM CHLORIDE, PRESERVATIVE FREE 300 UNITS: 5 INJECTION INTRAVENOUS at 22:22

## 2019-06-02 RX ADMIN — SUCRALFATE 1 G: 1 TABLET ORAL at 11:26

## 2019-06-02 RX ADMIN — CHLORHEXIDINE GLUCONATE 15 ML: 1.2 RINSE ORAL at 08:35

## 2019-06-02 RX ADMIN — Medication 10 ML: at 22:22

## 2019-06-02 RX ADMIN — SUCRALFATE 1 G: 1 TABLET ORAL at 20:30

## 2019-06-02 RX ADMIN — HYDROCORTISONE: 1 CREAM TOPICAL at 09:00

## 2019-06-02 RX ADMIN — SUCRALFATE 1 G: 1 TABLET ORAL at 08:36

## 2019-06-02 RX ADMIN — CEFTRIAXONE SODIUM 2 G: 2 INJECTION, POWDER, FOR SOLUTION INTRAMUSCULAR; INTRAVENOUS at 16:23

## 2019-06-02 RX ADMIN — CHLORHEXIDINE GLUCONATE 15 ML: 1.2 RINSE ORAL at 17:31

## 2019-06-02 RX ADMIN — Medication 500 MG: at 16:23

## 2019-06-02 RX ADMIN — SUCRALFATE 1 G: 1 TABLET ORAL at 16:23

## 2019-06-02 RX ADMIN — ALLOPURINOL 300 MG: 300 TABLET ORAL at 08:36

## 2019-06-02 RX ADMIN — ESCITALOPRAM OXALATE 10 MG: 10 TABLET ORAL at 08:36

## 2019-06-02 RX ADMIN — Medication 5 ML: at 22:00

## 2019-06-02 RX ADMIN — Medication 5 ML: at 16:23

## 2019-06-02 RX ADMIN — Medication 10 ML: at 05:06

## 2019-06-02 RX ADMIN — POTASSIUM CHLORIDE 20 MEQ: 20 TABLET, EXTENDED RELEASE ORAL at 08:36

## 2019-06-02 RX ADMIN — ACETAMINOPHEN: 500 TABLET, FILM COATED ORAL at 22:22

## 2019-06-02 RX ADMIN — PANTOPRAZOLE SODIUM 40 MG: 40 TABLET, DELAYED RELEASE ORAL at 08:36

## 2019-06-02 RX ADMIN — Medication 5 ML: at 08:36

## 2019-06-02 RX ADMIN — FLUCONAZOLE 200 MG: 100 TABLET ORAL at 08:36

## 2019-06-02 RX ADMIN — ACYCLOVIR 400 MG: 800 TABLET ORAL at 08:36

## 2019-06-02 RX ADMIN — Medication 800 UNITS: at 08:36

## 2019-06-02 RX ADMIN — POLYETHYLENE GLYCOL 3350 17 G: 17 POWDER, FOR SOLUTION ORAL at 08:43

## 2019-06-02 RX ADMIN — Medication 5 ML: at 11:26

## 2019-06-02 RX ADMIN — PRAVASTATIN SODIUM 10 MG: 20 TABLET ORAL at 22:21

## 2019-06-02 RX ADMIN — LORAZEPAM 1 MG: 1 TABLET ORAL at 22:21

## 2019-06-02 RX ADMIN — TERBINAFINE HYDROCHLORIDE: 1 CREAM TOPICAL at 09:00

## 2019-06-02 RX ADMIN — Medication 500 MG: at 11:26

## 2019-06-02 RX ADMIN — SODIUM CHLORIDE, PRESERVATIVE FREE 300 UNITS: 5 INJECTION INTRAVENOUS at 05:06

## 2019-06-02 RX ADMIN — HYDROCORTISONE: 1 CREAM TOPICAL at 17:31

## 2019-06-02 NOTE — PROGRESS NOTES
END OF SHIFT NOTE:    Intake/Output  06/02 0701 - 06/02 1900  In: 860 [P.O.:860]  Out: 200 [Urine:200]   Voiding: YES  Catheter: NO  Drain:              Stool:  1 occurrences. Stool Assessment  Stool Color: Brown(per pt) (05/24/19 0828)  Stool Appearance: Hard (06/02/19 0728)  Stool Amount: Medium(per pt) (05/24/19 0828)  Stool Source/Status: Rectum (05/24/19 2472)    Emesis:  0 occurrences. Emesis Assessment  Appearance: Undigested food (05/18/19 0258)  Emesis Amount: Small (05/18/19 0258)    VITAL SIGNS  Patient Vitals for the past 12 hrs:   Temp Pulse Resp BP SpO2   06/02/19 1526 98.2 °F (36.8 °C) 84 18 129/53 97 %   06/02/19 1122 98.2 °F (36.8 °C) 88 18 134/65 99 %   06/02/19 0726 98.2 °F (36.8 °C) 86 18 144/69 97 %       Pain Assessment  Pain 1  Pain Scale 1: Numeric (0 - 10) (06/02/19 1122)  Pain Intensity 1: 0 (06/02/19 1122)  Patient Stated Pain Goal: 0 (06/02/19 0205)  Pain Reassessment 1: Yes (05/31/19 1437)  Pain Onset 1: now (05/17/19 1600)  Pain Location 1: Chest (05/17/19 1600)  Pain Orientation 1: Mid (05/17/19 1600)  Pain Description 1: Sore (05/17/19 1600)  Pain Intervention(s) 1: Medication (see MAR) (05/17/19 1600)    Ambulating  Yes- frequently    Additional Information: Pt has ambulated in the hallway several times today.  at bedside. Shift report given to oncoming nurse at the bedside.     Toni Mehta RN

## 2019-06-02 NOTE — PROGRESS NOTES
[facility] Hematology & Oncology        Inpatient Hematology / Oncology Progress Note      Admission Date: 2019 10:53 AM  Reason for Admission/Hospital Course: Admission for antineoplastic chemotherapy [Z51.11]      24 Hour Events:  Afebrile, VSS   Day 25 post 7+3   Completed Midostaurin on   Awaiting count recovery  On Rocephin for strep bacteremia (EOT )   at bedside    ROS:  Constitutional:  negative for fever, chills, weakness, malaise, fatigue. CV:  negative for chest pain, palpitations, edema. Respiratory: negative for dyspnea, cough, wheezing. GI: negative abdominal pain, diarrhea,  nausea/vomiting. 10 point review of systems is otherwise negative with the exception of the elements mentioned above in the HPI. No Known Allergies    OBJECTIVE:  Patient Vitals for the past 8 hrs:   BP Temp Pulse Resp SpO2   19 0726 144/69 98.2 °F (36.8 °C) 86 18 97 %   19 0400 138/79 98 °F (36.7 °C) 79 16 96 %     Temp (24hrs), Av.5 °F (36.9 °C), Min:98 °F (36.7 °C), Max:99 °F (37.2 °C)     0701 -  1900  In: 360 [P.O.:360]  Out: -     Physical Exam:  Constitutional: Well developed, well nourished female in no acute distress, sitting comfortably on the hospital bed. HEENT: Normocephalic and atraumatic. Oropharynx is clear, mucous membranes are moist. Extraocular muscles are intact. Sclerae anicteric. Skin Warm and dry. No erythema. No pallor. + eczema like rash to hands. Ring worm appearance, small pea-sized area left thigh. Respiratory Lungs are clear to auscultation bilaterally without wheezes, rales or rhonchi, normal air exchange without accessory muscle use. CVS Normal rate, regular rhythm and normal S1 and S2. No murmurs, gallops, or rubs. Abdomen Soft, nontender and nondistended, normoactive bowel sounds. Neuro Grossly nonfocal with no obvious sensory or motor deficits.    MSK Normal range of motion in general. BLE edema improved   Psych Appropriate mood and affect. Labs:      Recent Labs     06/02/19 0351 06/01/19  0408 05/31/19  0304   WBC 0.3* 0.2* 0.3*   RBC 2.67* 2.66* 2.85*   HGB 8.1* 7.8* 8.3*   HCT 23.8* 23.6* 25.2*   MCV 89.1 88.7 88.4   MCH 30.3 29.3 29.1   MCHC 34.0 33.1 32.9   RDW 13.2 13.3 13.5   PLT 33* 41* 6*   GRANS 7*  --  3*   LYMPH 93*  --  97*   MONOS 0*  --  0*   EOS 0*  --  0*   BASOS 0  --  0   IG 0  --  0   DF AUTOMATED MANUAL AUTOMATED   ANEU 0.0*  --  0.0*   ABL 0.3*  --  0.3*   ABM 0.0*  --  0.0*   ALEKSANDR 0.0  --  0.0   ABB 0.0  --  0.0   AIG 0.0  --  0.0        Recent Labs     06/02/19 0351 06/01/19  0408 05/31/19  0304    141 141   K 4.2 4.2 4.1    105 104   CO2 27 28 29   AGAP 8 8 8   GLU 97 92 100   BUN 14 14 15   CREA 0.69 0.65 0.67   GFRAA >60 >60 >60   GFRNA >60 >60 >60   CA 9.5 8.9 9.4   SGOT 13* 12* 12*   AP 98 87 91   TP 6.8 6.4 6.6   ALB 3.4 3.4 3.3   GLOB 3.4 3.0 3.3   AGRAT 1.0* 1.1* 1.0*   MG 2.5* 2.3 2.1   PHOS 3.6 3.5 4.8*         Imaging:  XR CHEST SNGL V [[id number]] Collected: 05/18/19 0855   Order Status: Completed Updated: 05/18/19 0858   Narrative:     EXAM: Single view chest radiograph. INDICATION: 73 years Fever   COMPARISON: None. FINDINGS:  No focal lung opacity. No pneumothorax. No pleural effusion. The heart,  mediastinum, jhoana, and pulmonary vasculature are within normal limits. No  evidence of acute osseous abnormality. Right-sided chest port with tip  terminating in the right atrium. Impression:     IMPRESSION:   1. No evidence of pneumonia.        Medications:  Current Facility-Administered Medications   Medication Dose Route Frequency    polyethylene glycol (MIRALAX) packet 17 g  17 g Oral DAILY PRN    0.9% sodium chloride infusion 250 mL  250 mL IntraVENous PRN    cholecalciferol (VITAMIN D3) tablet 800 Units  800 Units Oral DAILY    terbinafine HCl (LAMISIL) 1 % cream   Topical BID    hydrocortisone-aloe vera 1 % topical cream   Topical BID    simethicone (MYLICON) tablet 80 mg  80 mg Oral QID PRN    cefTRIAXone (ROCEPHIN) 2 g in 0.9% sodium chloride (MBP/ADV) 50 mL  2 g IntraVENous Q24H    heparin (porcine) pf 300 Units  300 Units InterCATHeter Q8H    psyllium husk-aspartame (METAMUCIL FIBER) packet 1 Packet  1 Packet Oral BID PRN    potassium chloride (K-DUR, KLOR-CON) SR tablet 20 mEq  20 mEq Oral DAILY    chlorhexidine (PERIDEX) 0.12 % mouthwash 15 mL  15 mL Oral BID    sucralfate (CARAFATE) tablet 1 g  1 g Oral AC&HS    pantoprazole (PROTONIX) tablet 40 mg  40 mg Oral ACB    alum-mag hydroxide-simeth (MYLANTA) oral suspension 30 mL  30 mL Oral Q4H PRN    magic mouthwash (RENÉ) oral suspension 5 mL  5 mL Oral AC&HS    morphine injection 2 mg  2 mg IntraVENous Q4H PRN    HYDROcodone-acetaminophen (NORCO) 5-325 mg per tablet 1 Tab  1 Tab Oral Q6H PRN    psyllium husk-aspartame (METAMUCIL FIBER) packet 1 Packet  1 Packet Oral BID PRN    central line flush (saline) syringe 10 mL  10 mL InterCATHeter PRN    polyethylene glycol (MIRALAX) packet 17 g  17 g Oral DAILY PRN    LORazepam (ATIVAN) injection 0.5 mg  0.5 mg IntraVENous Q6H PRN    acetaminophen (TYLENOL) tablet 650 mg  650 mg Oral PRN    diphenhydrAMINE (BENADRYL) capsule 25 mg  25 mg Oral PRN    acetaminophen/diphenhydrAMINE (TYLENOL PM EXT STR) 500/25 mg   Oral QHS    acyclovir (ZOVIRAX) tablet 400 mg  400 mg Oral BID    allopurinol (ZYLOPRIM) tablet 300 mg  300 mg Oral DAILY    calcium carbonate (OS-CHRIS) tablet 500 mg [elemental]  500 mg Oral TID WITH MEALS    escitalopram oxalate (LEXAPRO) tablet 10 mg  10 mg Oral DAILY    fluconazole (DIFLUCAN) tablet 200 mg  200 mg Oral DAILY    lidocaine-prilocaine (EMLA) 2.5-2.5 % cream   Topical PRN    LORazepam (ATIVAN) tablet 1 mg  1 mg Oral QHS    ondansetron (ZOFRAN ODT) tablet 8 mg  8 mg Oral Q8H PRN    pravastatin (PRAVACHOL) tablet 10 mg  10 mg Oral QHS    vit C-E-zinc cit-lutein-zeaxan 50 mg-15 unit- 4.5 mg-2.5 mg chew (OCU-ABDOUL) 1 Tab (Patient Supplied)  1 Tab Oral DAILY         ASSESSMENT:    Problem List  Date Reviewed: 4/30/2019          Codes Class Noted    Acute myeloid leukemia not having achieved remission ([facility] ICD-10-CM: C92.00  ICD-9-CM: 205.00  5/9/2019        Admission for antineoplastic chemotherapy ICD-10-CM: Z51.11  ICD-9-CM: V58.11  5/5/2019        AML (acute myeloblastic leukemia) ([facility] ICD-10-CM: C92.00  ICD-9-CM: 205.00  4/28/2019        Weakness generalized ICD-10-CM: R53.1  ICD-9-CM: 780.79  4/28/2019        Pancytopenia ([facility] ICD-10-CM: G96.048  ICD-9-CM: 284.19  4/28/2019        Thrombocytopenia (H[facility]) ICD-10-CM: D69.6  ICD-9-CM: 287.5  4/27/2019                PLAN:  AML FLT 3+  5/6  BMbx planned for Tuesday then wait for BC from port results to determine start date of  cycle one 7 + 3 with Midostaurin day 8-21. Platelets will need to be at least 50k for bx tomorrow  5/7. BMbx today-platelets prior. Also needed prbc today for hgb 6.9.  5/08 Start 7+3 when afebrile per Dr. Laith Montgomery. 5/09 Day 1 of 7+3. Monitor TLS labs. 5/10 Day 2 7+3. Tolerating well. 5/13 Day 5 7+3. Midostaurin on the way. 5/16 Day 8 7+3. Day one Rydapt. 5/30 Day 22 7+3, Completed Midostaurin today. 6/2 Day 25 7+3. Awaiting count recovery, she will need a repeat BMbx after count recovery. Pancytopenia related to chemotherapy  5/16 transfuse per Alexander SOPs  5/31 Plts today     Possible port infection  5/5 Day one vanc/cefe. Follow cultures. Do not use port. May need to have Echo if cultures positive. 5/6 BCNTD. Appears improved today. Continue to monitor. No fevers or other symptoms. 5/7 Day 3 vanc/cefe. Site appears even better today. 5/8 Does not appear infected. Site bruised. 5/9 BCx negative. Per ID okay to start treatment. 5/13 Completed 7 days Vanc/cef. Now on levaquin per ID.   5/20 BC from port +GPC alpha strep-ID consulted  5/21 BC repeated today.  ID following for final read on previous cultures to determine if port needs to be removed. ECHO pending.   5/22 Echo with no vegetation. BC + strep parasanguinis-ID following. Repeat BC NTD  5/23 ID changed abx to Rocephin x 2 weeks EOT 6/15/19 then repeat BC x 2 post 7 days tx. No port removal needed. 5/29 Con't Rocephin (EOT 6/5) then repeat BCx ~ 7 days after therapy completed x 2, 24 hours apart    Neutropenic Fever  05/05 Pan-culture negative. On Vancomycin, Maxipime  05/08 Febrile overnight up to 102.5. Repeat cultures x 1. Continue antibiotics. Likely disease related. Consult ID for clearance. 05/09 BC remain negative. No fevers 48 hours. Continue vanc/cefe. 05/13 now only on LVQ per ID.  5/18 Neutropenic fever - 100.5. Pan-cultures obtained. Chest xray negative. Started on Vanc/Cef. 5/19 Afebrile. Port culture with gram positive cocci in chains, await further studies. Continue current antibiotics. 5/20 port +BC with GPC alpha strep-ID consulted  5/23 ID changed abx to Rocephin x 2 weeks EOT 6/5/19 then repeat BC x 2 post 7 days tx. No port removal needed. 5/31 Remains afebrile. Con't Rocephin (EOT 6/5) then repeat BCx ~ 7 days after therapy completed x 2, 24 hours apart    Bradycardia  5/14 EKG with no significant change. Skin Changes  05/26 Hydrocortisone to hands. Anti-fungal cream to area of ring worm. Alexander SOPs  Supportive care  ACV/Diflucan  LVQ dc'd while on cefe and vanc    Disposition:  Awaiting count recovery. Patient is worried about having repeat BMbx due to bad experience with first one at [facility]/ We will try to arrange for BMbx with sedation in IR after count recovery. Goals and plan of care reviewed with the patient. All questions answered to the best of our ability. Pema Crowell NP  [facility]ematology [facility] On[facility]  [address] [place] [address]  Office : [phone]  Fax : [phone]        Attending Addendum:  I have personally performed a face to face diagnostic evaluation on this patient.  I have reviewed and agree with the care plan as documented by Belia Arndt N.P. My findings are as follows: She has AML and is Day 25 post-Midostaurin/7+3, appears fatigued, heart rate regular without murmurs, abdomen is non-tender, bowel sounds are positive, we will continue IV ABX till 6/5/2019.               Gina Alexander MD      [facility] Hematology/Oncology  [address] [place] [address]  Office : [phone]  Fax : [phone]

## 2019-06-02 NOTE — PROGRESS NOTES
END OF SHIFT NOTE:    Intake/Output  06/01 1901 - 06/02 0700  In: 240 [P.O.:240]  Out: 1475 [Urine:1475]   Voiding: YES  Catheter: NO  Drain:              Stool:  0 occurrences. Stool Assessment  Stool Color: Brown(per pt) (05/24/19 0828)  Stool Appearance: Formed (06/01/19 0726)  Stool Amount: Medium(per pt) (05/24/19 0828)  Stool Source/Status: Rectum (05/24/19 7729)    Emesis:  0 occurrences. Emesis Assessment  Appearance: Undigested food (05/18/19 0258)  Emesis Amount: Small (05/18/19 0258)    VITAL SIGNS  Patient Vitals for the past 12 hrs:   Temp Pulse Resp BP SpO2   06/02/19 0400 98 °F (36.7 °C) 79 16 138/79 96 %   06/01/19 2335 98.5 °F (36.9 °C) 76 18 155/87 98 %   06/01/19 1930 99 °F (37.2 °C) 90 18 (!) 150/94 98 %       Pain Assessment  Pain 1  Pain Scale 1: Numeric (0 - 10) (06/02/19 0205)  Pain Intensity 1: 0 (06/02/19 0205)  Patient Stated Pain Goal: 0 (06/02/19 0205)  Pain Reassessment 1: Yes (05/31/19 1437)  Pain Onset 1: now (05/17/19 1600)  Pain Location 1: Chest (05/17/19 1600)  Pain Orientation 1: Mid (05/17/19 1600)  Pain Description 1: Sore (05/17/19 1600)  Pain Intervention(s) 1: Medication (see MAR) (05/17/19 1600)    Ambulating  Yes    Additional Information:   No new complaints;remains afebrile;no bleeding. Compliant w/ mouth care;neutropenic precautions. Shift report given to oncoming nurse Marcial Lopez at the bedside.     Doron To RN

## 2019-06-02 NOTE — PROGRESS NOTES
Problem: Falls - Risk of  Goal: *Absence of Falls  Outcome: Progressing Towards Goal     Problem: Discharge Planning  Goal: *Discharge to safe environment  Outcome: Progressing Towards Goal  Goal: *Knowledge of medication management  Outcome: Progressing Towards Goal  Goal: *Knowledge of discharge instructions  Outcome: Progressing Towards Goal     Problem: Anemia Care Plan (Adult and Pediatric)  Goal: *Labs within defined limits  Outcome: Progressing Towards Goal  Goal: *Tolerates increased activity  Outcome: Progressing Towards Goal     Problem: Pain  Goal: *Control of Pain  Outcome: Progressing Towards Goal

## 2019-06-03 LAB
ALBUMIN SERPL-MCNC: 3.4 G/DL (ref 3.2–4.6)
ALBUMIN/GLOB SERPL: 1.1 {RATIO} (ref 1.2–3.5)
ALP SERPL-CCNC: 99 U/L (ref 50–136)
ALT SERPL-CCNC: 23 U/L (ref 12–65)
ANION GAP SERPL CALC-SCNC: 6 MMOL/L (ref 7–16)
AST SERPL-CCNC: 12 U/L (ref 15–37)
BILIRUB SERPL-MCNC: 0.3 MG/DL (ref 0.2–1.1)
BUN SERPL-MCNC: 15 MG/DL (ref 8–23)
CALCIUM SERPL-MCNC: 8.7 MG/DL (ref 8.3–10.4)
CHLORIDE SERPL-SCNC: 105 MMOL/L (ref 98–107)
CO2 SERPL-SCNC: 29 MMOL/L (ref 21–32)
CREAT SERPL-MCNC: 0.74 MG/DL (ref 0.6–1)
DIFFERENTIAL METHOD BLD: ABNORMAL
ERYTHROCYTE [DISTWIDTH] IN BLOOD BY AUTOMATED COUNT: 13.2 % (ref 11.9–14.6)
GLOBULIN SER CALC-MCNC: 3.2 G/DL (ref 2.3–3.5)
GLUCOSE SERPL-MCNC: 96 MG/DL (ref 65–100)
HCT VFR BLD AUTO: 22.8 % (ref 35.8–46.3)
HGB BLD-MCNC: 7.6 G/DL (ref 11.7–15.4)
LDH SERPL L TO P-CCNC: 168 U/L (ref 110–210)
MAGNESIUM SERPL-MCNC: 2.5 MG/DL (ref 1.8–2.4)
MCH RBC QN AUTO: 29.5 PG (ref 26.1–32.9)
MCHC RBC AUTO-ENTMCNC: 33.3 G/DL (ref 31.4–35)
MCV RBC AUTO: 88.4 FL (ref 79.6–97.8)
NRBC # BLD: 0 K/UL (ref 0–0.2)
PHOSPHATE SERPL-MCNC: 3.4 MG/DL (ref 2.3–3.7)
PLATELET # BLD AUTO: 25 K/UL (ref 150–450)
PLATELET COMMENTS,PCOM: ABNORMAL
PMV BLD AUTO: 9.9 FL (ref 9.4–12.3)
POTASSIUM SERPL-SCNC: 4 MMOL/L (ref 3.5–5.1)
PROT SERPL-MCNC: 6.6 G/DL (ref 6.3–8.2)
RBC # BLD AUTO: 2.58 M/UL (ref 4.05–5.2)
RBC MORPH BLD: ABNORMAL
SODIUM SERPL-SCNC: 140 MMOL/L (ref 136–145)
URATE SERPL-MCNC: 2.7 MG/DL (ref 2.6–6)
WBC # BLD AUTO: 0.3 K/UL (ref 4.3–11.1)
WBC MORPH BLD: ABNORMAL

## 2019-06-03 PROCEDURE — 74011250636 HC RX REV CODE- 250/636: Performed by: INTERNAL MEDICINE

## 2019-06-03 PROCEDURE — 85025 COMPLETE CBC W/AUTO DIFF WBC: CPT

## 2019-06-03 PROCEDURE — 80053 COMPREHEN METABOLIC PANEL: CPT

## 2019-06-03 PROCEDURE — 99233 SBSQ HOSP IP/OBS HIGH 50: CPT | Performed by: INTERNAL MEDICINE

## 2019-06-03 PROCEDURE — 84100 ASSAY OF PHOSPHORUS: CPT

## 2019-06-03 PROCEDURE — 74011250637 HC RX REV CODE- 250/637: Performed by: NURSE PRACTITIONER

## 2019-06-03 PROCEDURE — 36591 DRAW BLOOD OFF VENOUS DEVICE: CPT

## 2019-06-03 PROCEDURE — 74011000250 HC RX REV CODE- 250: Performed by: NURSE PRACTITIONER

## 2019-06-03 PROCEDURE — 83615 LACTATE (LD) (LDH) ENZYME: CPT

## 2019-06-03 PROCEDURE — 74011000258 HC RX REV CODE- 258: Performed by: INTERNAL MEDICINE

## 2019-06-03 PROCEDURE — 83735 ASSAY OF MAGNESIUM: CPT

## 2019-06-03 PROCEDURE — 84550 ASSAY OF BLOOD/URIC ACID: CPT

## 2019-06-03 PROCEDURE — 65270000029 HC RM PRIVATE

## 2019-06-03 PROCEDURE — 74011250637 HC RX REV CODE- 250/637: Performed by: INTERNAL MEDICINE

## 2019-06-03 RX ADMIN — CEFTRIAXONE SODIUM 2 G: 2 INJECTION, POWDER, FOR SOLUTION INTRAMUSCULAR; INTRAVENOUS at 16:48

## 2019-06-03 RX ADMIN — LORAZEPAM 1 MG: 1 TABLET ORAL at 21:50

## 2019-06-03 RX ADMIN — TERBINAFINE HYDROCHLORIDE: 1 CREAM TOPICAL at 16:55

## 2019-06-03 RX ADMIN — POTASSIUM CHLORIDE 20 MEQ: 20 TABLET, EXTENDED RELEASE ORAL at 07:49

## 2019-06-03 RX ADMIN — ALLOPURINOL 300 MG: 300 TABLET ORAL at 07:50

## 2019-06-03 RX ADMIN — Medication 5 ML: at 12:15

## 2019-06-03 RX ADMIN — TERBINAFINE HYDROCHLORIDE: 1 CREAM TOPICAL at 07:48

## 2019-06-03 RX ADMIN — SUCRALFATE 1 G: 1 TABLET ORAL at 16:48

## 2019-06-03 RX ADMIN — PANTOPRAZOLE SODIUM 40 MG: 40 TABLET, DELAYED RELEASE ORAL at 05:37

## 2019-06-03 RX ADMIN — Medication 5 ML: at 05:37

## 2019-06-03 RX ADMIN — ESCITALOPRAM OXALATE 10 MG: 10 TABLET ORAL at 07:50

## 2019-06-03 RX ADMIN — Medication 5 ML: at 16:49

## 2019-06-03 RX ADMIN — Medication 500 MG: at 16:48

## 2019-06-03 RX ADMIN — SUCRALFATE 1 G: 1 TABLET ORAL at 21:01

## 2019-06-03 RX ADMIN — SUCRALFATE 1 G: 1 TABLET ORAL at 05:37

## 2019-06-03 RX ADMIN — ACYCLOVIR 400 MG: 800 TABLET ORAL at 07:49

## 2019-06-03 RX ADMIN — Medication 500 MG: at 12:15

## 2019-06-03 RX ADMIN — SODIUM CHLORIDE, PRESERVATIVE FREE 300 UNITS: 5 INJECTION INTRAVENOUS at 21:52

## 2019-06-03 RX ADMIN — CHLORHEXIDINE GLUCONATE 15 ML: 1.2 RINSE ORAL at 16:48

## 2019-06-03 RX ADMIN — HYDROCORTISONE: 1 CREAM TOPICAL at 16:56

## 2019-06-03 RX ADMIN — SODIUM CHLORIDE, PRESERVATIVE FREE 300 UNITS: 5 INJECTION INTRAVENOUS at 05:36

## 2019-06-03 RX ADMIN — ACYCLOVIR 400 MG: 800 TABLET ORAL at 16:48

## 2019-06-03 RX ADMIN — Medication 10 ML: at 21:51

## 2019-06-03 RX ADMIN — Medication 10 ML: at 05:36

## 2019-06-03 RX ADMIN — PRAVASTATIN SODIUM 10 MG: 20 TABLET ORAL at 21:48

## 2019-06-03 RX ADMIN — HYDROCORTISONE: 1 CREAM TOPICAL at 07:46

## 2019-06-03 RX ADMIN — SUCRALFATE 1 G: 1 TABLET ORAL at 12:15

## 2019-06-03 RX ADMIN — Medication 500 MG: at 07:50

## 2019-06-03 RX ADMIN — Medication 800 UNITS: at 07:50

## 2019-06-03 RX ADMIN — FLUCONAZOLE 200 MG: 100 TABLET ORAL at 07:50

## 2019-06-03 RX ADMIN — ACETAMINOPHEN: 500 TABLET, FILM COATED ORAL at 21:47

## 2019-06-03 RX ADMIN — CHLORHEXIDINE GLUCONATE 15 ML: 1.2 RINSE ORAL at 07:48

## 2019-06-03 NOTE — PROGRESS NOTES
END OF SHIFT NOTE:    Patient rested well this shift, no c/o pain or nausea, small lesion noted to inner aspect of right cheek, diligent mouth care completed. Patient ambulating often in hallways, would like to use track on 10th floor, if okay with MD/NP, for a change of scenery. Intake/Output  06/02 1901 - 06/03 0700  In: 1000 [P.O.:1000]  Out: 1150 [Urine:1150]   Voiding: YES  Catheter: NO  Drain:      Stool:  0 occurrences. Stool Assessment  Stool Color: Brown(per pt) (05/24/19 0828)  Stool Appearance: Hard (06/02/19 0728)  Stool Amount: Medium(per pt) (05/24/19 0828)  Stool Source/Status: Rectum (05/24/19 7415)    Emesis:  0 occurrences. Emesis Assessment  Appearance: Undigested food (05/18/19 0258)  Emesis Amount: Small (05/18/19 0258)    VITAL SIGNS  Patient Vitals for the past 12 hrs:   Temp Pulse Resp BP SpO2   06/03/19 0345 98 °F (36.7 °C) 91 18 96/62 99 %   06/02/19 2346 98.2 °F (36.8 °C) 83 18 146/76 98 %   06/02/19 1935 98.9 °F (37.2 °C) 93 18 121/73 98 %       Pain Assessment  Pain 1  Pain Scale 1: Numeric (0 - 10) (06/03/19 0345)  Pain Intensity 1: 0 (06/03/19 0345)  Patient Stated Pain Goal: 0 (06/03/19 0345)  Pain Reassessment 1: Yes (05/31/19 1437)  Pain Onset 1: now (05/17/19 1600)  Pain Location 1: Chest (05/17/19 1600)  Pain Orientation 1: Mid (05/17/19 1600)  Pain Description 1: Sore (05/17/19 1600)  Pain Intervention(s) 1: Medication (see MAR) (05/17/19 1600)    Ambulating  Yes - often    Shift report given to oncoming nurse at the bedside.     Carlos Lund

## 2019-06-03 NOTE — INTERDISCIPLINARY ROUNDS
1403-Interdisciplinary rounds with charge nurse, provider,  and . Presenting Problem:  AML  Daily Goal continue rocephin until wednesday  Expected Discharge Date: TBD  Expected Discharge Needs: NA  Patient/Family Concerns addressed

## 2019-06-03 NOTE — PROGRESS NOTES
Chart screened by  for discharge planning. No needs identified at this time. Case Management will continue to follow.

## 2019-06-03 NOTE — PROGRESS NOTES
[facility] Hematology & Oncology        Inpatient Hematology / Oncology Progress Note      Admission Date: 2019 10:53 AM  Reason for Admission/Hospital Course: Admission for antineoplastic chemotherapy [Z51.11]      24 Hour Events:  Afebrile, VSS   Day 26 post 7+3   Completed Midostaurin on   Awaiting count recovery  On Rocephin for strep bacteremia (EOT )   at bedside    ROS:  Constitutional:  negative for fever, chills, weakness, malaise, fatigue. CV:  negative for chest pain, palpitations, edema. Respiratory: negative for dyspnea, cough, wheezing. GI: negative abdominal pain, diarrhea,  nausea/vomiting. 10 point review of systems is otherwise negative with the exception of the elements mentioned above in the HPI. No Known Allergies    OBJECTIVE:  Patient Vitals for the past 8 hrs:   BP Temp Pulse Resp SpO2   19 0740 135/81 98.6 °F (37 °C) 87 18 95 %   19 0345 96/62 98 °F (36.7 °C) 91 18 99 %     Temp (24hrs), Av.4 °F (36.9 °C), Min:98 °F (36.7 °C), Max:98.9 °F (37.2 °C)     0701 -  1900  In: 360 [P.O.:360]  Out: 200 [Urine:200]    Physical Exam:  Constitutional: Well developed, well nourished female in no acute distress, sitting comfortably on the bedside bench. HEENT: Normocephalic and atraumatic. Oropharynx is clear, mucous membranes are moist. Extraocular muscles are intact. Sclerae anicteric. Skin Warm and dry. No erythema. No pallor. + eczema like rash to hands. Ring worm appearance, small pea-sized area left thigh. Respiratory Lungs are clear to auscultation bilaterally without wheezes, rales or rhonchi, normal air exchange without accessory muscle use. CVS Normal rate, regular rhythm and normal S1 and S2. No murmurs, gallops, or rubs. Abdomen Soft, nontender and nondistended, normoactive bowel sounds. Neuro Grossly nonfocal with no obvious sensory or motor deficits.    MSK Normal range of motion in general. BLE edema improved Psych Appropriate mood and affect. Labs:      Recent Labs     06/03/19 0339 06/02/19  0351 06/01/19  0408   WBC 0.3* 0.3* 0.2*   RBC 2.58* 2.67* 2.66*   HGB 7.6* 8.1* 7.8*   HCT 22.8* 23.8* 23.6*   MCV 88.4 89.1 88.7   MCH 29.5 30.3 29.3   MCHC 33.3 34.0 33.1   RDW 13.2 13.2 13.3   PLT 25* 33* 41*   GRANS  --  7*  --    LYMPH  --  93*  --    MONOS  --  0*  --    EOS  --  0*  --    BASOS  --  0  --    IG  --  0  --    DF MANUAL AUTOMATED MANUAL   ANEU  --  0.0*  --    ABL  --  0.3*  --    ABM  --  0.0*  --    ALEKSANDR  --  0.0  --    ABB  --  0.0  --    AIG  --  0.0  --         Recent Labs     06/03/19 0339 06/02/19 0351 06/01/19  0408    141 141   K 4.0 4.2 4.2    106 105   CO2 29 27 28   AGAP 6* 8 8   GLU 96 97 92   BUN 15 14 14   CREA 0.74 0.69 0.65   GFRAA >60 >60 >60   GFRNA >60 >60 >60   CA 8.7 9.5 8.9   SGOT 12* 13* 12*   AP 99 98 87   TP 6.6 6.8 6.4   ALB 3.4 3.4 3.4   GLOB 3.2 3.4 3.0   AGRAT 1.1* 1.0* 1.1*   MG 2.5* 2.5* 2.3   PHOS 3.4 3.6 3.5         Imaging:  XR CHEST SNGL V [[id number]] Collected: 05/18/19 0855   Order Status: Completed Updated: 05/18/19 0858   Narrative:     EXAM: Single view chest radiograph. INDICATION: 73 years Fever   COMPARISON: None. FINDINGS:  No focal lung opacity. No pneumothorax. No pleural effusion. The heart,  mediastinum, jhoana, and pulmonary vasculature are within normal limits. No  evidence of acute osseous abnormality. Right-sided chest port with tip  terminating in the right atrium. Impression:     IMPRESSION:   1. No evidence of pneumonia.        Medications:  Current Facility-Administered Medications   Medication Dose Route Frequency    polyethylene glycol (MIRALAX) packet 17 g  17 g Oral DAILY PRN    0.9% sodium chloride infusion 250 mL  250 mL IntraVENous PRN    cholecalciferol (VITAMIN D3) tablet 800 Units  800 Units Oral DAILY    terbinafine HCl (LAMISIL) 1 % cream   Topical BID    hydrocortisone-aloe vera 1 % topical cream   Topical BID  simethicone (MYLICON) tablet 80 mg  80 mg Oral QID PRN    cefTRIAXone (ROCEPHIN) 2 g in 0.9% sodium chloride (MBP/ADV) 50 mL  2 g IntraVENous Q24H    heparin (porcine) pf 300 Units  300 Units InterCATHeter Q8H    psyllium husk-aspartame (METAMUCIL FIBER) packet 1 Packet  1 Packet Oral BID PRN    potassium chloride (K-DUR, KLOR-CON) SR tablet 20 mEq  20 mEq Oral DAILY    chlorhexidine (PERIDEX) 0.12 % mouthwash 15 mL  15 mL Oral BID    sucralfate (CARAFATE) tablet 1 g  1 g Oral AC&HS    pantoprazole (PROTONIX) tablet 40 mg  40 mg Oral ACB    alum-mag hydroxide-simeth (MYLANTA) oral suspension 30 mL  30 mL Oral Q4H PRN    magic mouthwash (RENÉ) oral suspension 5 mL  5 mL Oral AC&HS    morphine injection 2 mg  2 mg IntraVENous Q4H PRN    HYDROcodone-acetaminophen (NORCO) 5-325 mg per tablet 1 Tab  1 Tab Oral Q6H PRN    psyllium husk-aspartame (METAMUCIL FIBER) packet 1 Packet  1 Packet Oral BID PRN    central line flush (saline) syringe 10 mL  10 mL InterCATHeter PRN    polyethylene glycol (MIRALAX) packet 17 g  17 g Oral DAILY PRN    LORazepam (ATIVAN) injection 0.5 mg  0.5 mg IntraVENous Q6H PRN    acetaminophen (TYLENOL) tablet 650 mg  650 mg Oral PRN    diphenhydrAMINE (BENADRYL) capsule 25 mg  25 mg Oral PRN    acetaminophen/diphenhydrAMINE (TYLENOL PM EXT STR) 500/25 mg   Oral QHS    acyclovir (ZOVIRAX) tablet 400 mg  400 mg Oral BID    allopurinol (ZYLOPRIM) tablet 300 mg  300 mg Oral DAILY    calcium carbonate (OS-CHRIS) tablet 500 mg [elemental]  500 mg Oral TID WITH MEALS    escitalopram oxalate (LEXAPRO) tablet 10 mg  10 mg Oral DAILY    fluconazole (DIFLUCAN) tablet 200 mg  200 mg Oral DAILY    lidocaine-prilocaine (EMLA) 2.5-2.5 % cream   Topical PRN    LORazepam (ATIVAN) tablet 1 mg  1 mg Oral QHS    ondansetron (ZOFRAN ODT) tablet 8 mg  8 mg Oral Q8H PRN    pravastatin (PRAVACHOL) tablet 10 mg  10 mg Oral QHS    vit C-E-zinc cit-lutein-zeaxan 50 mg-15 unit- 4.5 mg-2.5 mg chew (OCU-ABDOUL) 1 Tab (Patient Supplied)  1 Tab Oral DAILY         ASSESSMENT:    Problem List  Date Reviewed: 4/30/2019          Codes Class Noted    Acute myeloid leukemia not having achieved remission ([facility] ICD-10-CM: C92.00  ICD-9-CM: 205.00  5/9/2019        Admission for antineoplastic chemotherapy ICD-10-CM: Z51.11  ICD-9-CM: V58.11  5/5/2019        AML (acute myeloblastic leukemia) ([facility] ICD-10-CM: C92.00  ICD-9-CM: 205.00  4/28/2019        Weakness generalized ICD-10-CM: R53.1  ICD-9-CM: 780.79  4/28/2019        Pancytopenia ([facility] ICD-10-CM: Y23.881  ICD-9-CM: 284.19  4/28/2019        Thrombocytopenia ([facility]) ICD-10-CM: D69.6  ICD-9-CM: 287.5  4/27/2019                PLAN:  AML FLT 3+  5/6  BMbx planned for Tuesday then wait for BC from port results to determine start date of  cycle one 7 + 3 with Midostaurin day 8-21. Platelets will need to be at least 50k for bx tomorrow  5/7. BMbx today-platelets prior. Also needed prbc today for hgb 6.9.  5/08 Start 7+3 when afebrile per Dr. Astrid Estes. 5/09 Day 1 of 7+3. Monitor TLS labs. 5/10 Day 2 7+3. Tolerating well. 5/13 Day 5 7+3. Midostaurin on the way. 5/16 Day 8 7+3. Day one Rydapt. 5/30 Day 22 7+3, Completed Midostaurin today. 6/3 Day 26 7+3. Awaiting count recovery, she will need a repeat BMbx after count recovery. Pancytopenia related to chemotherapy  5/16 transfuse per [facility] SOPs  5/31 Plts today     Possible port infection  5/5 Day one vanc/cefe. Follow cultures. Do not use port. May need to have Echo if cultures positive. 5/6 BCNTD. Appears improved today. Continue to monitor. No fevers or other symptoms. 5/7 Day 3 vanc/cefe. Site appears even better today. 5/8 Does not appear infected. Site bruised. 5/9 BCx negative. Per ID okay to start treatment. 5/13 Completed 7 days Vanc/cef. Now on levaquin per ID.   5/20 BC from port +GPC alpha strep-ID consulted  5/21 BC repeated today.  ID following for final read on previous cultures to determine if port needs to be removed. ECHO pending.   5/22 Echo with no vegetation. BC + strep parasanguinis-ID following. Repeat BC NTD  5/23 ID changed abx to Rocephin x 2 weeks EOT 6/15/19 then repeat BC x 2 post 7 days tx. No port removal needed. 5/29 Con't Rocephin (EOT 6/5) then repeat BCx ~ 7 days after therapy completed x 2, 24 hours apart    Neutropenic Fever  05/05 Pan-culture negative. On Vancomycin, Maxipime  05/08 Febrile overnight up to 102.5. Repeat cultures x 1. Continue antibiotics. Likely disease related. Consult ID for clearance. 05/09 BC remain negative. No fevers 48 hours. Continue vanc/cefe. 05/13 now only on LVQ per ID.  5/18 Neutropenic fever - 100.5. Pan-cultures obtained. Chest xray negative. Started on Vanc/Cef. 5/19 Afebrile. Port culture with gram positive cocci in chains, await further studies. Continue current antibiotics. 5/20 port +BC with GPC alpha strep-ID consulted  5/23 ID changed abx to Rocephin x 2 weeks EOT 6/5/19 then repeat BC x 2 post 7 days tx. No port removal needed. 5/31 Remains afebrile. Con't Rocephin (EOT 6/5) then repeat BCx ~ 7 days after therapy completed x 2, 24 hours apart    Bradycardia  5/14 EKG with no significant change. Skin Changes  05/26 Hydrocortisone to hands. Anti-fungal cream to area of ring worm. [facility] SOPs  Supportive care  ACV/Diflucan  LVQ dc'd while on cefe and vanc    Disposition:  Awaiting count recovery. Patient is worried about having repeat BMbx due to bad experience with first one at [facility]/ We will try to arrange for BMbx with sedation in IR after count recovery. Goals and plan of care reviewed with the patient. All questions answered to the best of our ability. Sarah Diaz, ARMANDO  [facility] Hematology & Oncology  [address] [place] [address], [address] [place] [address]  Office : [phone]  Fax : [phone]      Attending Addendum:  I have personally performed a face to face diagnostic evaluation on this patient.  I have reviewed and agree with the care plan as documented above by Anthony Rosales N.P.  My findings are as follows: Patient appears stable, heart rate regular without murmurs, abdomen is non-tender, bowel sounds are positive. Elderly lady h/o AML FLT3 +ve, s/p 7+3+midostaurin. Await count recovery. Will get BM biopsy at that time. Strep parasanguinis bacteremia, now on rocephin. Continue other prophylactic abx. Transfuse as needed. Ambulation encouraged.            Total time 35 min 50% in direct consultation about the patient's diagnosis and management          Jenifer An MD  [facility] Hematology/Oncology  [address] [place] [address]  Office : [phone]  Fax : [phone]

## 2019-06-04 LAB
ALBUMIN SERPL-MCNC: 3.5 G/DL (ref 3.2–4.6)
ALBUMIN/GLOB SERPL: 1.1 {RATIO} (ref 1.2–3.5)
ALP SERPL-CCNC: 106 U/L (ref 50–136)
ALT SERPL-CCNC: 23 U/L (ref 12–65)
ANION GAP SERPL CALC-SCNC: 7 MMOL/L (ref 7–16)
AST SERPL-CCNC: 13 U/L (ref 15–37)
BILIRUB SERPL-MCNC: 0.4 MG/DL (ref 0.2–1.1)
BUN SERPL-MCNC: 14 MG/DL (ref 8–23)
CALCIUM SERPL-MCNC: 9.3 MG/DL (ref 8.3–10.4)
CHLORIDE SERPL-SCNC: 106 MMOL/L (ref 98–107)
CO2 SERPL-SCNC: 28 MMOL/L (ref 21–32)
CREAT SERPL-MCNC: 0.68 MG/DL (ref 0.6–1)
DIFFERENTIAL METHOD BLD: ABNORMAL
ERYTHROCYTE [DISTWIDTH] IN BLOOD BY AUTOMATED COUNT: 13 % (ref 11.9–14.6)
GLOBULIN SER CALC-MCNC: 3.1 G/DL (ref 2.3–3.5)
GLUCOSE SERPL-MCNC: 96 MG/DL (ref 65–100)
HCT VFR BLD AUTO: 21.7 % (ref 35.8–46.3)
HGB BLD-MCNC: 7.5 G/DL (ref 11.7–15.4)
MAGNESIUM SERPL-MCNC: 2.6 MG/DL (ref 1.8–2.4)
MCH RBC QN AUTO: 30.2 PG (ref 26.1–32.9)
MCHC RBC AUTO-ENTMCNC: 34.6 G/DL (ref 31.4–35)
MCV RBC AUTO: 87.5 FL (ref 79.6–97.8)
NRBC # BLD: 0 K/UL (ref 0–0.2)
PLATELET # BLD AUTO: 21 K/UL (ref 150–450)
PLATELET COMMENTS,PCOM: ABNORMAL
PMV BLD AUTO: 10.2 FL (ref 9.4–12.3)
POTASSIUM SERPL-SCNC: 4 MMOL/L (ref 3.5–5.1)
PROT SERPL-MCNC: 6.6 G/DL (ref 6.3–8.2)
RBC # BLD AUTO: 2.48 M/UL (ref 4.05–5.2)
RBC MORPH BLD: ABNORMAL
SODIUM SERPL-SCNC: 141 MMOL/L (ref 136–145)
WBC # BLD AUTO: 0.2 K/UL (ref 4.3–11.1)
WBC MORPH BLD: ABNORMAL

## 2019-06-04 PROCEDURE — 74011250637 HC RX REV CODE- 250/637: Performed by: INTERNAL MEDICINE

## 2019-06-04 PROCEDURE — 85025 COMPLETE CBC W/AUTO DIFF WBC: CPT

## 2019-06-04 PROCEDURE — 74011250637 HC RX REV CODE- 250/637: Performed by: NURSE PRACTITIONER

## 2019-06-04 PROCEDURE — 99233 SBSQ HOSP IP/OBS HIGH 50: CPT | Performed by: INTERNAL MEDICINE

## 2019-06-04 PROCEDURE — 74011000258 HC RX REV CODE- 258: Performed by: INTERNAL MEDICINE

## 2019-06-04 PROCEDURE — 80053 COMPREHEN METABOLIC PANEL: CPT

## 2019-06-04 PROCEDURE — 83735 ASSAY OF MAGNESIUM: CPT

## 2019-06-04 PROCEDURE — 65270000029 HC RM PRIVATE

## 2019-06-04 PROCEDURE — 74011000250 HC RX REV CODE- 250: Performed by: NURSE PRACTITIONER

## 2019-06-04 PROCEDURE — 74011250636 HC RX REV CODE- 250/636: Performed by: INTERNAL MEDICINE

## 2019-06-04 PROCEDURE — 36591 DRAW BLOOD OFF VENOUS DEVICE: CPT

## 2019-06-04 RX ADMIN — CHLORHEXIDINE GLUCONATE 15 ML: 1.2 RINSE ORAL at 17:48

## 2019-06-04 RX ADMIN — POTASSIUM CHLORIDE 20 MEQ: 20 TABLET, EXTENDED RELEASE ORAL at 08:08

## 2019-06-04 RX ADMIN — Medication 10 ML: at 14:42

## 2019-06-04 RX ADMIN — Medication 800 UNITS: at 08:09

## 2019-06-04 RX ADMIN — SUCRALFATE 1 G: 1 TABLET ORAL at 19:51

## 2019-06-04 RX ADMIN — Medication 500 MG: at 16:35

## 2019-06-04 RX ADMIN — LORAZEPAM 1 MG: 1 TABLET ORAL at 21:39

## 2019-06-04 RX ADMIN — CHLORHEXIDINE GLUCONATE 15 ML: 1.2 RINSE ORAL at 08:09

## 2019-06-04 RX ADMIN — TERBINAFINE HYDROCHLORIDE: 1 CREAM TOPICAL at 09:25

## 2019-06-04 RX ADMIN — TERBINAFINE HYDROCHLORIDE: 1 CREAM TOPICAL at 17:51

## 2019-06-04 RX ADMIN — ACYCLOVIR 400 MG: 800 TABLET ORAL at 08:08

## 2019-06-04 RX ADMIN — ALLOPURINOL 300 MG: 300 TABLET ORAL at 08:09

## 2019-06-04 RX ADMIN — FLUCONAZOLE 200 MG: 100 TABLET ORAL at 08:08

## 2019-06-04 RX ADMIN — SODIUM CHLORIDE, PRESERVATIVE FREE 300 UNITS: 5 INJECTION INTRAVENOUS at 14:42

## 2019-06-04 RX ADMIN — SUCRALFATE 1 G: 1 TABLET ORAL at 08:08

## 2019-06-04 RX ADMIN — SUCRALFATE 1 G: 1 TABLET ORAL at 16:35

## 2019-06-04 RX ADMIN — ESCITALOPRAM OXALATE 10 MG: 10 TABLET ORAL at 08:08

## 2019-06-04 RX ADMIN — Medication 500 MG: at 12:18

## 2019-06-04 RX ADMIN — Medication 10 ML: at 21:40

## 2019-06-04 RX ADMIN — SUCRALFATE 1 G: 1 TABLET ORAL at 12:18

## 2019-06-04 RX ADMIN — ACETAMINOPHEN: 500 TABLET, FILM COATED ORAL at 21:39

## 2019-06-04 RX ADMIN — Medication 10 ML: at 05:36

## 2019-06-04 RX ADMIN — PRAVASTATIN SODIUM 10 MG: 20 TABLET ORAL at 21:40

## 2019-06-04 RX ADMIN — HYDROCORTISONE: 1 CREAM TOPICAL at 09:25

## 2019-06-04 RX ADMIN — PANTOPRAZOLE SODIUM 40 MG: 40 TABLET, DELAYED RELEASE ORAL at 08:09

## 2019-06-04 RX ADMIN — ACYCLOVIR 400 MG: 800 TABLET ORAL at 17:49

## 2019-06-04 RX ADMIN — CEFTRIAXONE SODIUM 2 G: 2 INJECTION, POWDER, FOR SOLUTION INTRAMUSCULAR; INTRAVENOUS at 16:35

## 2019-06-04 RX ADMIN — SODIUM CHLORIDE, PRESERVATIVE FREE 300 UNITS: 5 INJECTION INTRAVENOUS at 05:36

## 2019-06-04 RX ADMIN — Medication 500 MG: at 08:09

## 2019-06-04 RX ADMIN — SODIUM CHLORIDE, PRESERVATIVE FREE 300 UNITS: 5 INJECTION INTRAVENOUS at 21:41

## 2019-06-04 RX ADMIN — HYDROCORTISONE: 1 CREAM TOPICAL at 17:50

## 2019-06-04 NOTE — PROGRESS NOTES
Problem: Falls - Risk of  Goal: *Absence of Falls  Description  Document Donata Carrel Fall Risk and appropriate interventions in the flowsheet.   Outcome: Progressing Towards Goal     Problem: Discharge Planning  Goal: *Discharge to safe environment  Outcome: Progressing Towards Goal  Goal: *Knowledge of medication management  Outcome: Progressing Towards Goal  Goal: *Knowledge of discharge instructions  Outcome: Progressing Towards Goal     Problem: Anemia Care Plan (Adult and Pediatric)  Goal: *Labs within defined limits  Outcome: Progressing Towards Goal  Goal: *Tolerates increased activity  Outcome: Progressing Towards Goal     Problem: Pain  Goal: *Control of Pain  Outcome: Progressing Towards Goal     Problem: Chemotherapy, Day 3 to Discharge  Goal: Off Pathway (Use only if patient is Off Pathway)  Outcome: Progressing Towards Goal  Goal: Activity/Safety  Outcome: Progressing Towards Goal  Goal: Consults, if ordered  Outcome: Progressing Towards Goal  Goal: Diagnostic Test/Procedures  Outcome: Progressing Towards Goal  Goal: Nutrition/Diet  Outcome: Progressing Towards Goal  Goal: Discharge Planning  Outcome: Progressing Towards Goal  Goal: Medications  Outcome: Progressing Towards Goal  Goal: Respiratory  Outcome: Progressing Towards Goal  Goal: Treatments/Interventions/Procedures  Outcome: Progressing Towards Goal  Goal: Psychosocial  Outcome: Progressing Towards Goal  Goal: *Optimal pain control at patient's stated goal  Outcome: Progressing Towards Goal  Goal: *Hemodynamically stable  Outcome: Progressing Towards Goal  Goal: *Adequate oxygenation  Outcome: Progressing Towards Goal

## 2019-06-04 NOTE — PROGRESS NOTES
END OF SHIFT NOTE:    Intake/Output  No intake/output data recorded. Voiding: YES  Catheter: NO  Drain:              Stool:  1 occurrences. Stool Assessment  Stool Color: Brown(per pt) (05/24/19 0828)  Stool Appearance: Hard (06/02/19 0728)  Stool Amount: Medium(per pt) (05/24/19 0828)  Stool Source/Status: Rectum (05/24/19 5071)    Emesis:  0 occurrences. Emesis Assessment  Appearance: Undigested food (05/18/19 0258)  Emesis Amount: Small (05/18/19 0258)    VITAL SIGNS  Patient Vitals for the past 12 hrs:   Temp Pulse Resp BP SpO2   06/03/19 1610 98.9 °F (37.2 °C) 93 17 137/68 98 %   06/03/19 1204 99.3 °F (37.4 °C) 90 18 134/69 97 %       Pain Assessment  Pain 1  Pain Scale 1: Numeric (0 - 10) (06/03/19 1400)  Pain Intensity 1: 0 (06/03/19 1400)  Patient Stated Pain Goal: 0 (06/03/19 1400)  Pain Reassessment 1: Yes (05/31/19 1437)  Pain Onset 1: now (05/17/19 1600)  Pain Location 1: Chest (05/17/19 1600)  Pain Orientation 1: Mid (05/17/19 1600)  Pain Description 1: Sore (05/17/19 1600)  Pain Intervention(s) 1: Medication (see MAR) (05/17/19 1600)    Ambulating  Yes    Additional Information:   -Waiting on count recovery  -No needs at this time    Shift report given to oncoming nurse at the bedside.     Nasrin Guajardo, RN

## 2019-06-04 NOTE — PROGRESS NOTES
"  Call placed to patient with update plan of care.  Pt did not have any complaints today said she was doing fine.   Asked pt if she had groceries in for weekend pending snow storm and she said 'Yes\".   Pt was made aware of Kadcyla plan for 1 yr, reviewed her next appt with Sheba and infusion.  Pt verbalized understanding of the plan and will discuss further with Sheba on 3/22/19.  Pt was going to eat her lunch she had just made.    Leslee Colon RNCC BSN CBCN           Discuss   Message Contents   Perlita Malik MD Tatsumi, Juliet D, PA Cc: Leslee Colon, CASSIE Scruggs,     You are seeing her prior to her next Kadcyla infusion.  I had discussed plan for 1 year of Kadcyla in detail with her on 12/11 but it seems she didn't understand or forgot the plan.  Recommendation for 1 year of Kadcyla is based on recommendation per the [facility] clinical trial.  Do you mind discussing with her again at your visit?     Perlita    Previous Messages      ----- Message -----   From: Isabell Deshpande RN   Sent: 3/1/2019   3:43 PM   To: Perlita Malik MD, *   Subject: Infusion Pt                                       Hello,     I wanted to let you know when I took care of Lennie in infusion today, she was very angry with the the lack of communication to her (from her viewpoint) about the starting of Kadcyla and the plan to continue every 3 weeks for one year. She was under the notion that she would be done with treatment after radiation. Pa did \"warn\" me before I saw her that Lennie may be unclear about the plan.     Also, I wanted to mention new onset of bilateral hand trembling, mostly with voluntary movement. This started before chemo and she said this is new sensation was new for her. She seemed very distressed, so I'm sure this could have correlated. If you could discuss this further with her too, I would greatly appreciate it.     Thank you!     Isabell MATTHEWS           " END OF SHIFT NOTE:    Intake/Output  No intake/output data recorded. Voiding: YES  Catheter: NO  Drain:              Stool:  0 occurrences. Stool Assessment  Stool Color: Brown(per pt) (05/24/19 0828)  Stool Appearance: Hard (06/02/19 0728)  Stool Amount: Medium(per pt) (05/24/19 0828)  Stool Source/Status: Rectum (05/24/19 0840)    Emesis:  0 occurrences. Emesis Assessment  Appearance: Undigested food (05/18/19 0258)  Emesis Amount: Small (05/18/19 0258)    VITAL SIGNS  Patient Vitals for the past 12 hrs:   Temp Pulse Resp BP SpO2   06/04/19 1609 97.9 °F (36.6 °C) 98 16 115/70 99 %   06/04/19 1153 98.1 °F (36.7 °C) 90 18 135/68 99 %   06/04/19 0806 98.2 °F (36.8 °C) 82 18 129/64 97 %       Pain Assessment  Pain 1  Pain Scale 1: Numeric (0 - 10) (06/04/19 0806)  Pain Intensity 1: 0 (06/04/19 0806)  Patient Stated Pain Goal: 0 (06/04/19 0806)  Pain Reassessment 1: Yes (05/31/19 1437)  Pain Onset 1: now (05/17/19 1600)  Pain Location 1: Chest (05/17/19 1600)  Pain Orientation 1: Mid (05/17/19 1600)  Pain Description 1: Sore (05/17/19 1600)  Pain Intervention(s) 1: Medication (see MAR) (05/17/19 1600)    Ambulating  Yes    Additional Information: Pt ambulating in the halls and tolerating well. Pt anxious to go home. Rocephin to complete 6/5. Pt wants head shaved in the am. No complaints noted at this time. Shift report given to Keenan Estevez RN oncoming nurse at the bedside.     Oh Camp RN

## 2019-06-04 NOTE — PROGRESS NOTES
[facility]        Inpatient Hematology / Oncology Progress Note      Admission Date: 2019 10:53 AM  Reason for Admission/Hospital Course: Admission for antineoplastic chemotherapy [Z51.11]      24 Hour Events:  Afebrile, VSS   Day 27 post 7+3   Completed Midostaurin on   Awaiting count recovery  On Rocephin for strep bacteremia (EOT )   at bedside    ROS:  Constitutional:  negative for fever, chills, weakness, malaise, fatigue. CV:  negative for chest pain, palpitations, edema. Respiratory: negative for dyspnea, cough, wheezing. GI: negative abdominal pain, diarrhea,  nausea/vomiting. 10 point review of systems is otherwise negative with the exception of the elements mentioned above in the HPI. No Known Allergies    OBJECTIVE:  Patient Vitals for the past 8 hrs:   BP Temp Pulse Resp SpO2   19 0806 129/64 98.2 °F (36.8 °C) 82 18 97 %   19 0425 135/73 98.2 °F (36.8 °C) 87 18 97 %     Temp (24hrs), Av.5 °F (36.9 °C), Min:98 °F (36.7 °C), Max:99.3 °F (37.4 °C)     0701 -  1900  In: 600 [P.O.:600]  Out: 500 [Urine:500]    Physical Exam:  Constitutional: Well developed, well nourished female in no acute distress, sitting comfortably on the bedside bench. HEENT: Normocephalic and atraumatic. Oropharynx is clear, mucous membranes are moist. Extraocular muscles are intact. Sclerae anicteric. Skin Warm and dry. No erythema. No pallor. + eczema like rash to hands. Ring worm appearance, small pea-sized area left thigh. Respiratory Lungs are clear to auscultation bilaterally without wheezes, rales or rhonchi, normal air exchange without accessory muscle use. CVS Normal rate, regular rhythm and normal S1 and S2. No murmurs, gallops, or rubs. Abdomen Soft, nontender and nondistended, normoactive bowel sounds. Neuro Grossly nonfocal with no obvious sensory or motor deficits.    MSK Normal range of motion in general. BLE edema improved Psych Appropriate mood and affect. Labs:      Recent Labs     06/04/19 0424 06/03/19 0339 06/02/19  0351   WBC 0.2* 0.3* 0.3*   RBC 2.48* 2.58* 2.67*   HGB 7.5* 7.6* 8.1*   HCT 21.7* 22.8* 23.8*   MCV 87.5 88.4 89.1   MCH 30.2 29.5 30.3   MCHC 34.6 33.3 34.0   RDW 13.0 13.2 13.2   PLT 21* 25* 33*   GRANS  --   --  7*   LYMPH  --   --  93*   MONOS  --   --  0*   EOS  --   --  0*   BASOS  --   --  0   IG  --   --  0   DF MANUAL MANUAL AUTOMATED   ANEU  --   --  0.0*   ABL  --   --  0.3*   ABM  --   --  0.0*   ALEKSANDR  --   --  0.0   ABB  --   --  0.0   AIG  --   --  0.0        Recent Labs     06/04/19 0424 06/03/19 0339 06/02/19  0351    140 141   K 4.0 4.0 4.2    105 106   CO2 28 29 27   AGAP 7 6* 8   GLU 96 96 97   BUN 14 15 14   CREA 0.68 0.74 0.69   GFRAA >60 >60 >60   GFRNA >60 >60 >60   CA 9.3 8.7 9.5   SGOT 13* 12* 13*    99 98   TP 6.6 6.6 6.8   ALB 3.5 3.4 3.4   GLOB 3.1 3.2 3.4   AGRAT 1.1* 1.1* 1.0*   MG 2.6* 2.5* 2.5*   PHOS  --  3.4 3.6         Imaging:  XR CHEST SNGL V [[id number]] Collected: 05/18/19 0855   Order Status: Completed Updated: 05/18/19 0858   Narrative:     EXAM: Single view chest radiograph. INDICATION: 73 years Fever   COMPARISON: None. FINDINGS:  No focal lung opacity. No pneumothorax. No pleural effusion. The heart,  mediastinum, jhoana, and pulmonary vasculature are within normal limits. No  evidence of acute osseous abnormality. Right-sided chest port with tip  terminating in the right atrium. Impression:     IMPRESSION:   1. No evidence of pneumonia.        Medications:  Current Facility-Administered Medications   Medication Dose Route Frequency    polyethylene glycol (MIRALAX) packet 17 g  17 g Oral DAILY PRN    0.9% sodium chloride infusion 250 mL  250 mL IntraVENous PRN    cholecalciferol (VITAMIN D3) tablet 800 Units  800 Units Oral DAILY    terbinafine HCl (LAMISIL) 1 % cream   Topical BID    hydrocortisone-aloe vera 1 % topical cream   Topical BID    simethicone (MYLICON) tablet 80 mg  80 mg Oral QID PRN    cefTRIAXone (ROCEPHIN) 2 g in 0.9% sodium chloride (MBP/ADV) 50 mL  2 g IntraVENous Q24H    heparin (porcine) pf 300 Units  300 Units InterCATHeter Q8H    psyllium husk-aspartame (METAMUCIL FIBER) packet 1 Packet  1 Packet Oral BID PRN    potassium chloride (K-DUR, KLOR-CON) SR tablet 20 mEq  20 mEq Oral DAILY    chlorhexidine (PERIDEX) 0.12 % mouthwash 15 mL  15 mL Oral BID    sucralfate (CARAFATE) tablet 1 g  1 g Oral AC&HS    pantoprazole (PROTONIX) tablet 40 mg  40 mg Oral ACB    alum-mag hydroxide-simeth (MYLANTA) oral suspension 30 mL  30 mL Oral Q4H PRN    magic mouthwash (RENÉ) oral suspension 5 mL  5 mL Oral AC&HS    morphine injection 2 mg  2 mg IntraVENous Q4H PRN    HYDROcodone-acetaminophen (NORCO) 5-325 mg per tablet 1 Tab  1 Tab Oral Q6H PRN    psyllium husk-aspartame (METAMUCIL FIBER) packet 1 Packet  1 Packet Oral BID PRN    central line flush (saline) syringe 10 mL  10 mL InterCATHeter PRN    polyethylene glycol (MIRALAX) packet 17 g  17 g Oral DAILY PRN    LORazepam (ATIVAN) injection 0.5 mg  0.5 mg IntraVENous Q6H PRN    acetaminophen (TYLENOL) tablet 650 mg  650 mg Oral PRN    diphenhydrAMINE (BENADRYL) capsule 25 mg  25 mg Oral PRN    acetaminophen/diphenhydrAMINE (TYLENOL PM EXT STR) 500/25 mg   Oral QHS    acyclovir (ZOVIRAX) tablet 400 mg  400 mg Oral BID    allopurinol (ZYLOPRIM) tablet 300 mg  300 mg Oral DAILY    calcium carbonate (OS-CHRIS) tablet 500 mg [elemental]  500 mg Oral TID WITH MEALS    escitalopram oxalate (LEXAPRO) tablet 10 mg  10 mg Oral DAILY    fluconazole (DIFLUCAN) tablet 200 mg  200 mg Oral DAILY    lidocaine-prilocaine (EMLA) 2.5-2.5 % cream   Topical PRN    LORazepam (ATIVAN) tablet 1 mg  1 mg Oral QHS    ondansetron (ZOFRAN ODT) tablet 8 mg  8 mg Oral Q8H PRN    pravastatin (PRAVACHOL) tablet 10 mg  10 mg Oral QHS    vit C-E-zinc cit-lutein-zeaxan 50 mg-15 unit- 4.5 mg-2.5 mg chew (OCU-ABDOUL) 1 Tab (Patient Supplied)  1 Tab Oral DAILY         ASSESSMENT:    Problem List  Date Reviewed: 4/30/2019          Codes Class Noted    Acute myeloid leukemia not having achieved remission ([facility] ICD-10-CM: C92.00  ICD-9-CM: 205.00  5/9/2019        Admission for antineoplastic chemotherapy ICD-10-CM: Z51.11  ICD-9-CM: V58.11  5/5/2019        AML (acute myeloblastic leukemia) ([facility] ICD-10-CM: C92.00  ICD-9-CM: 205.00  4/28/2019        Weakness generalized ICD-10-CM: R53.1  ICD-9-CM: 780.79  4/28/2019        Pancytopenia ([facility] ICD-10-CM: Y55.722  ICD-9-CM: 284.19  4/28/2019        Thrombocytopenia (HCC) ICD-10-CM: D69.6  ICD-9-CM: 287.5  4/27/2019                PLAN:  AML FLT 3+  5/6  BMbx planned for Tuesday then wait for BC from port results to determine start date of  cycle one 7 + 3 with Midostaurin day 8-21. Platelets will need to be at least 50k for bx tomorrow  5/7. BMbx today-platelets prior. Also needed prbc today for hgb 6.9.  5/08 Start 7+3 when afebrile per Dr. Adelia Skiff. 5/09 Day 1 of 7+3. Monitor TLS labs. 5/16 Day 8 7+3. Day one Rydapt. 5/30 Day 22 7+3, Completed Midostaurin today. 6/4 Day 27 7+3. Awaiting count recovery, she will need a repeat BMbx after count recovery. Pancytopenia related to chemotherapy  5/16 transfuse per Alexander SOPs     Possible port infection  5/5 Day one vanc/cefe. Follow cultures. Do not use port. May need to have Echo if cultures positive. 5/6 BCNTD. Appears improved today. Continue to monitor. No fevers or other symptoms. 5/7 Day 3 vanc/cefe. Site appears even better today. 5/8 Does not appear infected. Site bruised. 5/9 BCx negative. Per ID okay to start treatment. 5/13 Completed 7 days Vanc/cef. Now on levaquin per ID.   5/20 BC from port +GPC alpha strep-ID consulted  5/21 BC repeated today. ID following for final read on previous cultures to determine if port needs to be removed. ECHO pending.   5/22 Echo with no vegetation.  BC + strep parasanguinis-ID following. Repeat BC NTD  5/23 ID changed abx to Rocephin x 2 weeks EOT 6/15/19 then repeat BC x 2 post 7 days tx. No port removal needed. 6/4 Con't Rocephin (EOT 6/5) then repeat BCx ~ 7 days after therapy completed x 2, 24 hours apart    Neutropenic Fever  05/05 Pan-culture negative. On Vancomycin, Maxipime  05/08 Febrile overnight up to 102.5. Repeat cultures x 1. Continue antibiotics. Likely disease related. Consult ID for clearance. 05/09 BC remain negative. No fevers 48 hours. Continue vanc/cefe. 05/13 now only on LVQ per ID.  5/18 Neutropenic fever - 100.5. Pan-cultures obtained. Chest xray negative. Started on Vanc/Cef. 5/19 Afebrile. Port culture with gram positive cocci in chains, await further studies. Continue current antibiotics. 5/20 port +BC with GPC alpha strep-ID consulted  5/23 ID changed abx to Rocephin x 2 weeks EOT 6/5/19 then repeat BC x 2 post 7 days tx. No port removal needed. 6/4 Remains afebrile. Con't Rocephin (EOT 6/5) then repeat BCx ~ 7 days after therapy completed x 2, 24 hours apart    Bradycardia  5/14 EKG with no significant change. Skin Changes  05/26 Hydrocortisone to hands. Anti-fungal cream to area of ring worm. Alexander SOPs  Supportive care  ACV/Diflucan  LVQ dc'd while on cefe and vanc    Disposition:  Awaiting count recovery. Patient is worried about having repeat BMbx due to bad experience with first one at [facility]/ We will try to arrange for BMbx with sedation in IR after count recovery. Goals and plan of care reviewed with the patient. All questions answered to the best of our ability. Matilde Huber NP  [facility] Hematology & Oncology  [address] [place] [address], [address] [place] [address]  Office : [phone]  Fax : [phone]        Attending Addendum:  I have personally performed a face to face diagnostic evaluation on this patient.  I have reviewed and agree with the care plan as documented above by Matilde Huber, N.P.  My findings are as follows: Patient appears stable, heart rate regular without murmurs, abdomen is non-tender, bowel sounds are positive. Elderly lady h/o AML FLT3 +ve, s/p 7+3+midostaurin. Await count recovery. Will get BM biopsy at that time. Strep parasanguinis bacteremia, now on rocephin. Continue other prophylactic abx. Transfuse as needed. Ambulation encouraged. Continue current care.            Total time 35 min 50% in direct consultation about the patient's diagnosis and management          Mamie Degroot MD  [facility] Hematology/Oncology  [address] [place] [address]  Office : [phone]  Fax : [phone]

## 2019-06-05 LAB
ALBUMIN SERPL-MCNC: 3.4 G/DL (ref 3.2–4.6)
ALBUMIN/GLOB SERPL: 1.1 {RATIO} (ref 1.2–3.5)
ALP SERPL-CCNC: 105 U/L (ref 50–136)
ALT SERPL-CCNC: 22 U/L (ref 12–65)
ANION GAP SERPL CALC-SCNC: 8 MMOL/L (ref 7–16)
AST SERPL-CCNC: 12 U/L (ref 15–37)
BILIRUB SERPL-MCNC: 0.4 MG/DL (ref 0.2–1.1)
BUN SERPL-MCNC: 13 MG/DL (ref 8–23)
CALCIUM SERPL-MCNC: 8.7 MG/DL (ref 8.3–10.4)
CHLORIDE SERPL-SCNC: 106 MMOL/L (ref 98–107)
CO2 SERPL-SCNC: 27 MMOL/L (ref 21–32)
CREAT SERPL-MCNC: 0.69 MG/DL (ref 0.6–1)
DIFFERENTIAL METHOD BLD: ABNORMAL
ERYTHROCYTE [DISTWIDTH] IN BLOOD BY AUTOMATED COUNT: 13 % (ref 11.9–14.6)
GLOBULIN SER CALC-MCNC: 3 G/DL (ref 2.3–3.5)
GLUCOSE SERPL-MCNC: 95 MG/DL (ref 65–100)
HCT VFR BLD AUTO: 21.1 % (ref 35.8–46.3)
HGB BLD-MCNC: 7.2 G/DL (ref 11.7–15.4)
MAGNESIUM SERPL-MCNC: 2.4 MG/DL (ref 1.8–2.4)
MCH RBC QN AUTO: 29.9 PG (ref 26.1–32.9)
MCHC RBC AUTO-ENTMCNC: 34.1 G/DL (ref 31.4–35)
MCV RBC AUTO: 87.6 FL (ref 79.6–97.8)
NRBC # BLD: 0 K/UL (ref 0–0.2)
PLATELET # BLD AUTO: 15 K/UL (ref 150–450)
PLATELET COMMENTS,PCOM: ABNORMAL
PMV BLD AUTO: 10.5 FL (ref 9.4–12.3)
POTASSIUM SERPL-SCNC: 4 MMOL/L (ref 3.5–5.1)
PROT SERPL-MCNC: 6.4 G/DL (ref 6.3–8.2)
RBC # BLD AUTO: 2.41 M/UL (ref 4.05–5.2)
RBC MORPH BLD: ABNORMAL
SODIUM SERPL-SCNC: 141 MMOL/L (ref 136–145)
WBC # BLD AUTO: 0.2 K/UL (ref 4.3–11.1)
WBC MORPH BLD: ABNORMAL

## 2019-06-05 PROCEDURE — 74011250637 HC RX REV CODE- 250/637: Performed by: INTERNAL MEDICINE

## 2019-06-05 PROCEDURE — 74011250637 HC RX REV CODE- 250/637: Performed by: NURSE PRACTITIONER

## 2019-06-05 PROCEDURE — 74011000258 HC RX REV CODE- 258: Performed by: NURSE PRACTITIONER

## 2019-06-05 PROCEDURE — 99233 SBSQ HOSP IP/OBS HIGH 50: CPT | Performed by: INTERNAL MEDICINE

## 2019-06-05 PROCEDURE — 83735 ASSAY OF MAGNESIUM: CPT

## 2019-06-05 PROCEDURE — 80053 COMPREHEN METABOLIC PANEL: CPT

## 2019-06-05 PROCEDURE — 74011000250 HC RX REV CODE- 250: Performed by: NURSE PRACTITIONER

## 2019-06-05 PROCEDURE — 74011250636 HC RX REV CODE- 250/636: Performed by: NURSE PRACTITIONER

## 2019-06-05 PROCEDURE — 65270000029 HC RM PRIVATE

## 2019-06-05 PROCEDURE — 85025 COMPLETE CBC W/AUTO DIFF WBC: CPT

## 2019-06-05 PROCEDURE — 74011250636 HC RX REV CODE- 250/636: Performed by: INTERNAL MEDICINE

## 2019-06-05 PROCEDURE — 36591 DRAW BLOOD OFF VENOUS DEVICE: CPT

## 2019-06-05 RX ORDER — LEVOFLOXACIN 500 MG/1
500 TABLET, FILM COATED ORAL EVERY 24 HOURS
Status: DISCONTINUED | OUTPATIENT
Start: 2019-06-06 | End: 2019-06-07 | Stop reason: HOSPADM

## 2019-06-05 RX ADMIN — Medication 500 MG: at 16:57

## 2019-06-05 RX ADMIN — SUCRALFATE 1 G: 1 TABLET ORAL at 20:10

## 2019-06-05 RX ADMIN — POTASSIUM CHLORIDE 20 MEQ: 20 TABLET, EXTENDED RELEASE ORAL at 07:53

## 2019-06-05 RX ADMIN — CEFTRIAXONE SODIUM 2 G: 2 INJECTION, POWDER, FOR SOLUTION INTRAMUSCULAR; INTRAVENOUS at 16:58

## 2019-06-05 RX ADMIN — PANTOPRAZOLE SODIUM 40 MG: 40 TABLET, DELAYED RELEASE ORAL at 07:53

## 2019-06-05 RX ADMIN — Medication 500 MG: at 12:36

## 2019-06-05 RX ADMIN — SODIUM CHLORIDE, PRESERVATIVE FREE 300 UNITS: 5 INJECTION INTRAVENOUS at 14:25

## 2019-06-05 RX ADMIN — FLUCONAZOLE 200 MG: 100 TABLET ORAL at 07:54

## 2019-06-05 RX ADMIN — SUCRALFATE 1 G: 1 TABLET ORAL at 07:53

## 2019-06-05 RX ADMIN — ACYCLOVIR 400 MG: 800 TABLET ORAL at 16:57

## 2019-06-05 RX ADMIN — SODIUM CHLORIDE, PRESERVATIVE FREE 300 UNITS: 5 INJECTION INTRAVENOUS at 22:18

## 2019-06-05 RX ADMIN — ACETAMINOPHEN: 500 TABLET, FILM COATED ORAL at 22:18

## 2019-06-05 RX ADMIN — SODIUM CHLORIDE, PRESERVATIVE FREE 300 UNITS: 5 INJECTION INTRAVENOUS at 05:35

## 2019-06-05 RX ADMIN — HYDROCORTISONE: 1 CREAM TOPICAL at 17:00

## 2019-06-05 RX ADMIN — CHLORHEXIDINE GLUCONATE 15 ML: 1.2 RINSE ORAL at 07:52

## 2019-06-05 RX ADMIN — Medication 800 UNITS: at 07:53

## 2019-06-05 RX ADMIN — ESCITALOPRAM OXALATE 10 MG: 10 TABLET ORAL at 07:53

## 2019-06-05 RX ADMIN — Medication 500 MG: at 07:53

## 2019-06-05 RX ADMIN — HYDROCORTISONE: 1 CREAM TOPICAL at 08:02

## 2019-06-05 RX ADMIN — CHLORHEXIDINE GLUCONATE 15 ML: 1.2 RINSE ORAL at 16:58

## 2019-06-05 RX ADMIN — SUCRALFATE 1 G: 1 TABLET ORAL at 12:36

## 2019-06-05 RX ADMIN — Medication 10 ML: at 05:35

## 2019-06-05 RX ADMIN — PRAVASTATIN SODIUM 10 MG: 20 TABLET ORAL at 20:10

## 2019-06-05 RX ADMIN — ALLOPURINOL 300 MG: 300 TABLET ORAL at 07:53

## 2019-06-05 RX ADMIN — ACYCLOVIR 400 MG: 800 TABLET ORAL at 07:53

## 2019-06-05 RX ADMIN — LORAZEPAM 1 MG: 1 TABLET ORAL at 22:18

## 2019-06-05 RX ADMIN — SUCRALFATE 1 G: 1 TABLET ORAL at 16:57

## 2019-06-05 NOTE — PROGRESS NOTES
Problem: Falls - Risk of  Goal: *Absence of Falls  Description  Document [facility] Fall Risk and appropriate interventions in the flowsheet.   Outcome: Progressing Towards Goal     Problem: Pain  Goal: *Control of Pain  Outcome: Progressing Towards Goal

## 2019-06-05 NOTE — PROGRESS NOTES
END OF SHIFT NOTE: 
 
Intake/Output 06/05 0701 - 06/05 1900 In: 2485 [P.O.:2485] Out: 1450 [UQNMX:3325] Voiding: YES Catheter: NO 
Drain:   
 
 
 
 
 
Stool:  0 occurrences. Stool Assessment Stool Color: Brown(per pt) (05/24/19 8603) Stool Appearance: Soft(per patient) (06/05/19 1799) Stool Amount: Medium(per pt) (05/24/19 4983) Stool Source/Status: Rectum (05/24/19 1398) Emesis:  0 occurrences. Emesis Assessment Appearance: Undigested food (05/18/19 0258) Emesis Amount: Small (05/18/19 0258) VITAL SIGNS Patient Vitals for the past 12 hrs: 
 Temp Pulse Resp BP SpO2  
06/05/19 1640 99 °F (37.2 °C) 90 16 120/72 97 % 06/05/19 1042 98.7 °F (37.1 °C) 92 18 126/80 97 % 06/05/19 0725 98.5 °F (36.9 °C) 80 16 135/72 98 % Pain Assessment Pain 1 Pain Scale 1: Numeric (0 - 10) (06/05/19 1430) Pain Intensity 1: 0 (06/05/19 1430) Patient Stated Pain Goal: 0 (06/05/19 1430) Pain Reassessment 1: Yes (05/31/19 1437) Pain Onset 1: now (05/17/19 1600) Pain Location 1: Chest (05/17/19 1600) Pain Orientation 1: Mid (05/17/19 1600) Pain Description 1: Sore (05/17/19 1600) Pain Intervention(s) 1: Medication (see MAR) (05/17/19 1600) Ambulating Yes Additional Information:  
-Awaiting count recovery for bmbx  
-Blister to chest near port dressing 
-ambulating 
-states might need something to have BM tomorrow morning Shift report given to oncoming nurse at the bedside.  
 
Yelitza Barnes RN

## 2019-06-05 NOTE — PROGRESS NOTES
[facility] Hematology & Oncology        Inpatient Hematology / Oncology Progress Note      Admission Date: 2019 10:53 AM  Reason for Admission/Hospital Course: Admission for antineoplastic chemotherapy [Z51.11]      24 Hour Events:  Afebrile, VSS   Day 28 post 7+3   Completed Midostaurin on   Awaiting count recovery  On Rocephin for strep bacteremia (EOT  - today)   at bedside    ROS:  Constitutional:  negative for fever, chills, weakness, malaise, fatigue. CV:  negative for chest pain, palpitations, edema. Respiratory: negative for dyspnea, cough, wheezing. GI: negative abdominal pain, diarrhea,  nausea/vomiting. 10 point review of systems is otherwise negative with the exception of the elements mentioned above in the HPI. No Known Allergies    OBJECTIVE:  Patient Vitals for the past 8 hrs:   BP Temp Pulse Resp SpO2   19 1042 126/80 98.7 °F (37.1 °C) 92 18 97 %   19 0725 135/72 98.5 °F (36.9 °C) 80 16 98 %     Temp (24hrs), Av.4 °F (36.9 °C), Min:97.9 °F (36.6 °C), Max:98.8 °F (37.1 °C)     0701 -  1900  In: 18 [P.O.:1480]  Out: 750 [Urine:750]    Physical Exam:  Constitutional: Well developed, well nourished female in no acute distress, sitting comfortably on the bedside bench. HEENT: Normocephalic and atraumatic. Oropharynx is clear, mucous membranes are moist. Extraocular muscles are intact. Sclerae anicteric. Skin Warm and dry. No erythema. No pallor. + eczema like rash to hands. Respiratory Lungs are clear to auscultation bilaterally without wheezes, rales or rhonchi, normal air exchange without accessory muscle use. CVS Normal rate, regular rhythm and normal S1 and S2. No murmurs, gallops, or rubs. Abdomen Soft, nontender and nondistended, normoactive bowel sounds. Neuro Grossly nonfocal with no obvious sensory or motor deficits. MSK Normal range of motion in general. BLE edema improved   Psych Appropriate mood and affect. Labs:      Recent Labs     06/05/19  0353 06/04/19 0424 06/03/19  0339   WBC 0.2* 0.2* 0.3*   RBC 2.41* 2.48* 2.58*   HGB 7.2* 7.5* 7.6*   HCT 21.1* 21.7* 22.8*   MCV 87.6 87.5 88.4   MCH 29.9 30.2 29.5   MCHC 34.1 34.6 33.3   RDW 13.0 13.0 13.2   PLT 15* 21* 25*   DF MANUAL MANUAL MANUAL        Recent Labs     06/05/19 0353 06/04/19 0424 06/03/19  0339    141 140   K 4.0 4.0 4.0    106 105   CO2 27 28 29   AGAP 8 7 6*   GLU 95 96 96   BUN 13 14 15   CREA 0.69 0.68 0.74   GFRAA >60 >60 >60   GFRNA >60 >60 >60   CA 8.7 9.3 8.7   SGOT 12* 13* 12*    106 99   TP 6.4 6.6 6.6   ALB 3.4 3.5 3.4   GLOB 3.0 3.1 3.2   AGRAT 1.1* 1.1* 1.1*   MG 2.4 2.6* 2.5*   PHOS  --   --  3.4         Imaging:  XR CHEST SNGL V [[id number]] Collected: 05/18/19 0855   Order Status: Completed Updated: 05/18/19 0858   Narrative:     EXAM: Single view chest radiograph. INDICATION: 73 years Fever   COMPARISON: None. FINDINGS:  No focal lung opacity. No pneumothorax. No pleural effusion. The heart,  mediastinum, jhoana, and pulmonary vasculature are within normal limits. No  evidence of acute osseous abnormality. Right-sided chest port with tip  terminating in the right atrium. Impression:     IMPRESSION:   1. No evidence of pneumonia.        Medications:  Current Facility-Administered Medications   Medication Dose Route Frequency    magic mouthwash (RENÉ) oral suspension 5 mL  5 mL Oral Q4H PRN    polyethylene glycol (MIRALAX) packet 17 g  17 g Oral DAILY PRN    0.9% sodium chloride infusion 250 mL  250 mL IntraVENous PRN    cholecalciferol (VITAMIN D3) tablet 800 Units  800 Units Oral DAILY    hydrocortisone-aloe vera 1 % topical cream   Topical BID    simethicone (MYLICON) tablet 80 mg  80 mg Oral QID PRN    cefTRIAXone (ROCEPHIN) 2 g in 0.9% sodium chloride (MBP/ADV) 50 mL  2 g IntraVENous Q24H    heparin (porcine) pf 300 Units  300 Units InterCATHeter Q8H    psyllium husk-aspartame (METAMUCIL FIBER) packet 1 Packet  1 Packet Oral BID PRN    potassium chloride (K-DUR, KLOR-CON) SR tablet 20 mEq  20 mEq Oral DAILY    chlorhexidine (PERIDEX) 0.12 % mouthwash 15 mL  15 mL Oral BID    sucralfate (CARAFATE) tablet 1 g  1 g Oral AC&HS    pantoprazole (PROTONIX) tablet 40 mg  40 mg Oral ACB    alum-mag hydroxide-simeth (MYLANTA) oral suspension 30 mL  30 mL Oral Q4H PRN    morphine injection 2 mg  2 mg IntraVENous Q4H PRN    HYDROcodone-acetaminophen (NORCO) 5-325 mg per tablet 1 Tab  1 Tab Oral Q6H PRN    psyllium husk-aspartame (METAMUCIL FIBER) packet 1 Packet  1 Packet Oral BID PRN    central line flush (saline) syringe 10 mL  10 mL InterCATHeter PRN    polyethylene glycol (MIRALAX) packet 17 g  17 g Oral DAILY PRN    LORazepam (ATIVAN) injection 0.5 mg  0.5 mg IntraVENous Q6H PRN    acetaminophen (TYLENOL) tablet 650 mg  650 mg Oral PRN    diphenhydrAMINE (BENADRYL) capsule 25 mg  25 mg Oral PRN    acetaminophen/diphenhydrAMINE (TYLENOL PM EXT STR) 500/25 mg   Oral QHS    acyclovir (ZOVIRAX) tablet 400 mg  400 mg Oral BID    allopurinol (ZYLOPRIM) tablet 300 mg  300 mg Oral DAILY    calcium carbonate (OS-CHRIS) tablet 500 mg [elemental]  500 mg Oral TID WITH MEALS    escitalopram oxalate (LEXAPRO) tablet 10 mg  10 mg Oral DAILY    fluconazole (DIFLUCAN) tablet 200 mg  200 mg Oral DAILY    lidocaine-prilocaine (EMLA) 2.5-2.5 % cream   Topical PRN    LORazepam (ATIVAN) tablet 1 mg  1 mg Oral QHS    ondansetron (ZOFRAN ODT) tablet 8 mg  8 mg Oral Q8H PRN    pravastatin (PRAVACHOL) tablet 10 mg  10 mg Oral QHS    vit C-E-zinc cit-lutein-zeaxan 50 mg-15 unit- 4.5 mg-2.5 mg chew (OCU-ABDOUL) 1 Tab (Patient Supplied)  1 Tab Oral DAILY         ASSESSMENT:    Problem List  Date Reviewed: 4/30/2019          Codes Class Noted    Acute myeloid leukemia not having achieved remission [facility]) ICD-10-CM: C92.00  ICD-9-CM: 205.00  5/9/2019        Admission for antineoplastic chemotherapy ICD-10-CM: Z51.11  ICD-9-CM: V58.11  5/5/2019        AML (acute myeloblastic leukemia) ([facility] ICD-10-CM: C92.00  ICD-9-CM: 205.00  4/28/2019        Weakness generalized ICD-10-CM: R53.1  ICD-9-CM: 780.79  4/28/2019        Pancytopenia ([facility] ICD-10-CM: B38.696  ICD-9-CM: 284.19  4/28/2019        Thrombocytopenia (HCC) ICD-10-CM: D69.6  ICD-9-CM: 287.5  4/27/2019                PLAN:  AML FLT 3+  5/6  BMbx planned for Tuesday then wait for [facility] from port results to determine start date of  cycle one 7 + 3 with Midostaurin day 8-21. Platelets will need to be at least 50k for bx tomorrow  5/7. BMbx today-platelets prior. Also needed prbc today for hgb 6.9.  5/08 Start 7+3 when afebrile per Dr. Rosa Avila. 5/09 Day 1 of 7+3. Monitor TLS labs. 5/16 Day 8 7+3. Day one Rydapt. 5/30 Day 22 7+3, Completed Midostaurin today. 6/4 Day 27 7+3. Awaiting count recovery, she will need a repeat BMbx after count recovery. Pancytopenia related to chemotherapy  5/16 transfuse per [facility] SOPs     Possible port infection  5/5 Day one vanc/cefe. Follow cultures. Do not use port. May need to have Echo if cultures positive. 5/6 BCNTD. Appears improved today. Continue to monitor. No fevers or other symptoms. 5/7 Day 3 vanc/cefe. Site appears even better today. 5/8 Does not appear infected. Site bruised. 5/9 BCx negative. Per ID okay to start treatment. 5/13 Completed 7 days Vanc/cef. Now on levaquin per ID.   5/20 BC from port +GPC alpha strep-ID consulted  5/21 BC repeated today. ID following for final read on previous cultures to determine if port needs to be removed. ECHO pending.   5/22 Echo with no vegetation. BC + strep parasanguinis-ID following. Repeat BC NTD  5/23 ID changed abx to Rocephin x 2 weeks EOT 6/15/19 then repeat BC x 2 post 7 days tx. No port removal needed. 6/4 Con't Rocephin (EOT 6/5) then repeat BCx ~ 7 days after therapy completed x 2, 24 hours apart    Neutropenic Fever  05/05 Pan-culture negative.  On Vancomycin, Maxipime  05/08 Febrile overnight up to 102.5. Repeat cultures x 1. Continue antibiotics. Likely disease related. Consult ID for clearance. 05/09 BC remain negative. No fevers 48 hours. Continue vanc/cefe. 05/13 now only on LVQ per ID.  5/18 Neutropenic fever - 100.5. Pan-cultures obtained. Chest xray negative. Started on Vanc/Cef. 5/19 Afebrile. Port culture with gram positive cocci in chains, await further studies. Continue current antibiotics. 5/20 port +BC with GPC alpha strep-ID consulted  5/23 ID changed abx to Rocephin x 2 weeks EOT 6/5/19 then repeat BC x 2 post 7 days tx. No port removal needed. 6/4 Remains afebrile. Con't Rocephin (EOT 6/5) then repeat BCx ~ 7 (6/12 and 6/13) days after therapy completed x 2, 24 hours apart  6/5 Completes rocephin. Resume prophylactic levaquin    Bradycardia  5/14 EKG with no significant change. Skin Changes  05/26 Hydrocortisone to hands. Anti-fungal cream to area of ring worm. Alexander SOPs  Supportive care  ACV/Diflucan  LVQ dc'd while on cefe and vanc    Disposition:  No changes today. Awaiting count recovery. Patient is worried about having repeat BMbx due to bad experience with first one at [facility]/ We will try to arrange for BMbx with sedation in IR after count recovery. Goals and plan of care reviewed with the patient. All questions answered to the best of our ability. Faustino Best NP  [facility] Hematology & Oncology  [address] [place] [address]  Office : [phone]  Fax : [phone]      Attending Addendum:  I have personally performed a face to face diagnostic evaluation on this patient. I have reviewed and agree with the care plan as documented above by Donna Lozada N.P.  My findings are as follows: Patient appears stable, heart rate regular without murmurs, abdomen is non-tender, bowel sounds are positive. Elderly lady h/o AML FLT3 +ve, s/p 7+3+midostaurin. Day 28 Await count recovery.  Will get BM biopsy at that time. If fails to recover then BM next week. Strep parasanguinis bacteremia, completed rocephin. Continue other prophylactic abx. Transfuse as needed. Ambulation encouraged. Continue current care.            Total time 35 min 50% in direct consultation about the patient's diagnosis and management          Mortimer Rosa, MD  [facility] Hematology/Oncology  [address] [address]  Office : [phone]  Fax : [phone]

## 2019-06-05 NOTE — INTERDISCIPLINARY ROUNDS
Interdisciplinary rounds with charge nurse, provider,  and . Presenting Problem: AML Daily Goal: rocephin complete; monitor counts Expected Discharge Date: TBD Expected Discharge Needs: NA 
Patient/Family Concerns addressed

## 2019-06-06 LAB
ALBUMIN SERPL-MCNC: 3.6 G/DL (ref 3.2–4.6)
ALBUMIN/GLOB SERPL: 1.2 {RATIO} (ref 1.2–3.5)
ALP SERPL-CCNC: 111 U/L (ref 50–136)
ALT SERPL-CCNC: 19 U/L (ref 12–65)
ANION GAP SERPL CALC-SCNC: 7 MMOL/L (ref 7–16)
AST SERPL-CCNC: 13 U/L (ref 15–37)
BILIRUB SERPL-MCNC: 0.4 MG/DL (ref 0.2–1.1)
BUN SERPL-MCNC: 13 MG/DL (ref 8–23)
CALCIUM SERPL-MCNC: 8.6 MG/DL (ref 8.3–10.4)
CHLORIDE SERPL-SCNC: 107 MMOL/L (ref 98–107)
CO2 SERPL-SCNC: 26 MMOL/L (ref 21–32)
CREAT SERPL-MCNC: 0.7 MG/DL (ref 0.6–1)
DIFFERENTIAL METHOD BLD: ABNORMAL
ERYTHROCYTE [DISTWIDTH] IN BLOOD BY AUTOMATED COUNT: 13 % (ref 11.9–14.6)
GLOBULIN SER CALC-MCNC: 3.1 G/DL (ref 2.3–3.5)
GLUCOSE SERPL-MCNC: 99 MG/DL (ref 65–100)
HCT VFR BLD AUTO: 21.3 % (ref 35.8–46.3)
HGB BLD-MCNC: 7.3 G/DL (ref 11.7–15.4)
MAGNESIUM SERPL-MCNC: 2.3 MG/DL (ref 1.8–2.4)
MCH RBC QN AUTO: 29.8 PG (ref 26.1–32.9)
MCHC RBC AUTO-ENTMCNC: 34.3 G/DL (ref 31.4–35)
MCV RBC AUTO: 86.9 FL (ref 79.6–97.8)
NRBC # BLD: 0 K/UL (ref 0–0.2)
PLATELET # BLD AUTO: 19 K/UL (ref 150–450)
PLATELET COMMENTS,PCOM: ABNORMAL
PMV BLD AUTO: 11.2 FL (ref 9.4–12.3)
POTASSIUM SERPL-SCNC: 4 MMOL/L (ref 3.5–5.1)
PROT SERPL-MCNC: 6.7 G/DL (ref 6.3–8.2)
RBC # BLD AUTO: 2.45 M/UL (ref 4.05–5.2)
RBC MORPH BLD: ABNORMAL
SODIUM SERPL-SCNC: 140 MMOL/L (ref 136–145)
WBC # BLD AUTO: 0.2 K/UL (ref 4.3–11.1)
WBC MORPH BLD: ABNORMAL

## 2019-06-06 PROCEDURE — 86900 BLOOD TYPING SEROLOGIC ABO: CPT

## 2019-06-06 PROCEDURE — 74011250637 HC RX REV CODE- 250/637: Performed by: NURSE PRACTITIONER

## 2019-06-06 PROCEDURE — 36430 TRANSFUSION BLD/BLD COMPNT: CPT

## 2019-06-06 PROCEDURE — 65270000029 HC RM PRIVATE

## 2019-06-06 PROCEDURE — 86923 COMPATIBILITY TEST ELECTRIC: CPT

## 2019-06-06 PROCEDURE — 74011250636 HC RX REV CODE- 250/636: Performed by: INTERNAL MEDICINE

## 2019-06-06 PROCEDURE — 77030039270 HC TU BLD FLTR CARD -A

## 2019-06-06 PROCEDURE — 36591 DRAW BLOOD OFF VENOUS DEVICE: CPT

## 2019-06-06 PROCEDURE — 74011250637 HC RX REV CODE- 250/637: Performed by: INTERNAL MEDICINE

## 2019-06-06 PROCEDURE — 80053 COMPREHEN METABOLIC PANEL: CPT

## 2019-06-06 PROCEDURE — 74011000250 HC RX REV CODE- 250: Performed by: NURSE PRACTITIONER

## 2019-06-06 PROCEDURE — 86644 CMV ANTIBODY: CPT

## 2019-06-06 PROCEDURE — 85025 COMPLETE CBC W/AUTO DIFF WBC: CPT

## 2019-06-06 PROCEDURE — P9040 RBC LEUKOREDUCED IRRADIATED: HCPCS

## 2019-06-06 PROCEDURE — 99233 SBSQ HOSP IP/OBS HIGH 50: CPT | Performed by: INTERNAL MEDICINE

## 2019-06-06 PROCEDURE — 83735 ASSAY OF MAGNESIUM: CPT

## 2019-06-06 PROCEDURE — 77030003560 HC NDL HUBR BARD -A

## 2019-06-06 RX ORDER — AMOXICILLIN 250 MG
1 CAPSULE ORAL DAILY
Status: DISCONTINUED | OUTPATIENT
Start: 2019-06-06 | End: 2019-06-07 | Stop reason: HOSPADM

## 2019-06-06 RX ORDER — POLYETHYLENE GLYCOL 3350 17 G/17G
17 POWDER, FOR SOLUTION ORAL DAILY
Status: DISCONTINUED | OUTPATIENT
Start: 2019-06-06 | End: 2019-06-07 | Stop reason: HOSPADM

## 2019-06-06 RX ORDER — SODIUM CHLORIDE 9 MG/ML
250 INJECTION, SOLUTION INTRAVENOUS AS NEEDED
Status: DISCONTINUED | OUTPATIENT
Start: 2019-06-06 | End: 2019-06-07 | Stop reason: HOSPADM

## 2019-06-06 RX ORDER — CLINDAMYCIN HYDROCHLORIDE 150 MG/1
300 CAPSULE ORAL 4 TIMES DAILY
Status: DISCONTINUED | OUTPATIENT
Start: 2019-06-06 | End: 2019-06-07 | Stop reason: HOSPADM

## 2019-06-06 RX ADMIN — SUCRALFATE 1 G: 1 TABLET ORAL at 12:28

## 2019-06-06 RX ADMIN — Medication 500 MG: at 12:29

## 2019-06-06 RX ADMIN — LORAZEPAM 1 MG: 1 TABLET ORAL at 21:41

## 2019-06-06 RX ADMIN — CLINDAMYCIN HYDROCHLORIDE 300 MG: 150 CAPSULE ORAL at 17:52

## 2019-06-06 RX ADMIN — SENNOSIDES AND DOCUSATE SODIUM 1 TABLET: 8.6; 5 TABLET ORAL at 10:11

## 2019-06-06 RX ADMIN — POTASSIUM CHLORIDE 20 MEQ: 20 TABLET, EXTENDED RELEASE ORAL at 08:15

## 2019-06-06 RX ADMIN — HYDROCORTISONE: 1 CREAM TOPICAL at 17:52

## 2019-06-06 RX ADMIN — ACYCLOVIR 400 MG: 800 TABLET ORAL at 08:15

## 2019-06-06 RX ADMIN — Medication 500 MG: at 17:52

## 2019-06-06 RX ADMIN — CHLORHEXIDINE GLUCONATE 15 ML: 1.2 RINSE ORAL at 08:17

## 2019-06-06 RX ADMIN — CLINDAMYCIN HYDROCHLORIDE 300 MG: 150 CAPSULE ORAL at 21:39

## 2019-06-06 RX ADMIN — Medication 500 MG: at 08:15

## 2019-06-06 RX ADMIN — SODIUM CHLORIDE, PRESERVATIVE FREE 300 UNITS: 5 INJECTION INTRAVENOUS at 16:00

## 2019-06-06 RX ADMIN — ACYCLOVIR 400 MG: 800 TABLET ORAL at 17:52

## 2019-06-06 RX ADMIN — HYDROCORTISONE: 1 CREAM TOPICAL at 08:18

## 2019-06-06 RX ADMIN — PRAVASTATIN SODIUM 10 MG: 20 TABLET ORAL at 21:40

## 2019-06-06 RX ADMIN — SODIUM CHLORIDE, PRESERVATIVE FREE 300 UNITS: 5 INJECTION INTRAVENOUS at 05:51

## 2019-06-06 RX ADMIN — ALLOPURINOL 300 MG: 300 TABLET ORAL at 08:15

## 2019-06-06 RX ADMIN — ESCITALOPRAM OXALATE 10 MG: 10 TABLET ORAL at 08:15

## 2019-06-06 RX ADMIN — SODIUM CHLORIDE, PRESERVATIVE FREE 300 UNITS: 5 INJECTION INTRAVENOUS at 21:41

## 2019-06-06 RX ADMIN — ACETAMINOPHEN: 500 TABLET, FILM COATED ORAL at 21:49

## 2019-06-06 RX ADMIN — CLINDAMYCIN HYDROCHLORIDE 300 MG: 150 CAPSULE ORAL at 12:28

## 2019-06-06 RX ADMIN — Medication 800 UNITS: at 08:15

## 2019-06-06 RX ADMIN — FLUCONAZOLE 200 MG: 100 TABLET ORAL at 08:15

## 2019-06-06 RX ADMIN — SUCRALFATE 1 G: 1 TABLET ORAL at 16:00

## 2019-06-06 RX ADMIN — SUCRALFATE 1 G: 1 TABLET ORAL at 08:15

## 2019-06-06 RX ADMIN — LEVOFLOXACIN 500 MG: 500 TABLET, FILM COATED ORAL at 05:51

## 2019-06-06 RX ADMIN — PANTOPRAZOLE SODIUM 40 MG: 40 TABLET, DELAYED RELEASE ORAL at 08:15

## 2019-06-06 RX ADMIN — SUCRALFATE 1 G: 1 TABLET ORAL at 21:40

## 2019-06-06 RX ADMIN — POLYMYXIN B SULFATE, BACITRACIN ZINC, NEOMYCIN SULFATE: 5000; 3.5; 4 OINTMENT TOPICAL at 12:29

## 2019-06-06 RX ADMIN — CHLORHEXIDINE GLUCONATE 15 ML: 1.2 RINSE ORAL at 17:52

## 2019-06-06 NOTE — PROGRESS NOTES
END OF SHIFT NOTE: 
 
Intake/Output No intake/output data recorded. Voiding: YES Catheter: NO 
Drain:   
 
 
 
 
 
Stool:  1 occurrences. Stool Assessment Stool Color: Brown(per pt) (05/24/19 7981) Stool Appearance: Soft(per patient) (06/05/19 0312) Stool Amount: Medium(per pt) (05/24/19 0434) Stool Source/Status: Rectum (05/24/19 7627) Emesis:  0 occurrences. Emesis Assessment Appearance: Undigested food (05/18/19 0258) Emesis Amount: Small (05/18/19 0258) VITAL SIGNS Patient Vitals for the past 12 hrs: 
 Temp Pulse Resp BP SpO2  
06/06/19 1448 98.2 °F (36.8 °C) 84 18 142/74 97 % 06/06/19 1135 98 °F (36.7 °C) 87 17 141/72 98 % 06/06/19 0750 98.1 °F (36.7 °C) (!) 106 18 120/66 96 % Pain Assessment Pain 1 Pain Scale 1: Numeric (0 - 10) (06/06/19 1559) Pain Intensity 1: 0 (06/06/19 1559) Patient Stated Pain Goal: 0 (06/06/19 1559) Pain Reassessment 1: Patient resting w/respiratory rate greater than 10 (06/06/19 0234) Pain Onset 1: now (05/17/19 1600) Pain Location 1: Chest (05/17/19 1600) Pain Orientation 1: Mid (05/17/19 1600) Pain Description 1: Sore (05/17/19 1600) Pain Intervention(s) 1: Medication (see MAR) (05/17/19 1600) Ambulating Yes Additional Information: Neosporin applied to blister below port. Cleomycin added. 1u PRBCs to be given tonight. Planning for d/c tomorrow. Shift report given to oncoming nurse at the bedside. Hamlet Owusu

## 2019-06-06 NOTE — PROGRESS NOTES
[facility] Hematology & Oncology Inpatient Hematology / Oncology Progress Note Admission Date: 2019 10:53 AM 
Reason for Admission/Hospital Course: Admission for antineoplastic chemotherapy [Z51.11] 24 Hour Events: 
Afebrile, VSS Day 29 post 7+3 Completed Midostaurin on  Awaiting count recovery Blister with surrounding erythema noted below port dressing Family at bedside ROS: 
Constitutional:  negative for fever, chills, weakness, malaise, fatigue. CV:  negative for chest pain, palpitations, edema. Respiratory: negative for dyspnea, cough, wheezing. GI: +constipation. negative abdominal pain, diarrhea,  nausea/vomiting. 10 point review of systems is otherwise negative with the exception of the elements mentioned above in the HPI. No Known Allergies OBJECTIVE: 
Patient Vitals for the past 8 hrs: 
 BP Temp Pulse Resp SpO2  
19 0750 120/66 98.1 °F (36.7 °C) (!) 106 18 96 % 19 0332 146/78 98.4 °F (36.9 °C) 73 16 98 % Temp (24hrs), Av.6 °F (37 °C), Min:98.1 °F (36.7 °C), Max:99 °F (37.2 °C) 
 
 0701 -  1900 In: 360 [P.O.:360] Out: 30 [Urine:30] Physical Exam: 
Constitutional: Well developed, well nourished female in no acute distress, sitting comfortably on the hospital bed. HEENT: Normocephalic and atraumatic. Oropharynx is clear, mucous membranes are moist. Extraocular muscles are intact. Sclerae anicteric. Skin Warm and dry. No erythema. No pallor. + eczema like rash to hands. Blister with surrounding erythema below port dressing. Respiratory Lungs are clear to auscultation bilaterally without wheezes, rales or rhonchi, normal air exchange without accessory muscle use. CVS Normal rate, regular rhythm and normal S1 and S2. No murmurs, gallops, or rubs. Abdomen Soft, nontender and nondistended, normoactive bowel sounds. Neuro Grossly nonfocal with no obvious sensory or motor deficits. MSK Normal range of motion in general. BLE edema improved Psych Appropriate mood and affect. Labs: 
   
Recent Labs  
  06/06/19 0319 06/05/19 0353 06/04/19 0424 WBC 0.2* 0.2* 0.2*  
RBC 2.45* 2.41* 2.48* HGB 7.3* 7.2* 7.5* HCT 21.3* 21.1* 21.7* MCV 86.9 87.6 87.5 MCH 29.8 29.9 30.2 MCHC 34.3 34.1 34.6  
RDW 13.0 13.0 13.0 PLT 19* 15* 21* DF MANUAL MANUAL MANUAL Recent Labs  
  06/06/19 0319 06/05/19 0353 06/04/19 0424  141 141  
K 4.0 4.0 4.0  
 106 106 CO2 26 27 28 AGAP 7 8 7 GLU 99 95 96 BUN 13 13 14 CREA 0.70 0.69 0.68 GFRAA >60 >60 >60 GFRNA >60 >60 >60  
CA 8.6 8.7 9.3 SGOT 13* 12* 13*  105 106  
TP 6.7 6.4 6.6 ALB 3.6 3.4 3.5 GLOB 3.1 3.0 3.1 AGRAT 1.2 1.1* 1.1*  
MG 2.3 2.4 2.6* Imaging: XR CHEST SNGL V [[id number]] Collected: 05/18/19 0855 Order Status: Completed Updated: 05/18/19 8110 Narrative:    
EXAM: Single view chest radiograph. INDICATION: 73 years Fever COMPARISON: None. FINDINGS: 
No focal lung opacity. No pneumothorax. No pleural effusion. The heart, 
mediastinum, jhoana, and pulmonary vasculature are within normal limits. No 
evidence of acute osseous abnormality. Right-sided chest port with tip 
terminating in the right atrium. Impression:    
IMPRESSION:  
1. No evidence of pneumonia. Medications: 
Current Facility-Administered Medications Medication Dose Route Frequency  polyethylene glycol (MIRALAX) packet 17 g  17 g Oral DAILY  senna-docusate (PERICOLACE) 8.6-50 mg per tablet 1 Tab  1 Tab Oral DAILY  levoFLOXacin (LEVAQUIN) tablet 500 mg  500 mg Oral Q24H  
 magic mouthwash (RENÉ) oral suspension 5 mL  5 mL Oral Q4H PRN  
 0.9% sodium chloride infusion 250 mL  250 mL IntraVENous PRN  cholecalciferol (VITAMIN D3) tablet 800 Units  800 Units Oral DAILY  hydrocortisone-aloe vera 1 % topical cream   Topical BID  simethicone (MYLICON) tablet 80 mg  80 mg Oral QID PRN  
  heparin (porcine) pf 300 Units  300 Units InterCATHeter Q8H  psyllium husk-aspartame (METAMUCIL FIBER) packet 1 Packet  1 Packet Oral BID PRN  potassium chloride (K-DUR, KLOR-CON) SR tablet 20 mEq  20 mEq Oral DAILY  chlorhexidine (PERIDEX) 0.12 % mouthwash 15 mL  15 mL Oral BID  sucralfate (CARAFATE) tablet 1 g  1 g Oral AC&HS  pantoprazole (PROTONIX) tablet 40 mg  40 mg Oral ACB  alum-mag hydroxide-simeth (MYLANTA) oral suspension 30 mL  30 mL Oral Q4H PRN  
 morphine injection 2 mg  2 mg IntraVENous Q4H PRN  
 HYDROcodone-acetaminophen (NORCO) 5-325 mg per tablet 1 Tab  1 Tab Oral Q6H PRN  psyllium husk-aspartame (METAMUCIL FIBER) packet 1 Packet  1 Packet Oral BID PRN  
 central line flush (saline) syringe 10 mL  10 mL InterCATHeter PRN  
 LORazepam (ATIVAN) injection 0.5 mg  0.5 mg IntraVENous Q6H PRN  
 acetaminophen (TYLENOL) tablet 650 mg  650 mg Oral PRN  
 diphenhydrAMINE (BENADRYL) capsule 25 mg  25 mg Oral PRN  
 acetaminophen/diphenhydrAMINE (TYLENOL PM EXT STR) 500/25 mg   Oral QHS  acyclovir (ZOVIRAX) tablet 400 mg  400 mg Oral BID  allopurinol (ZYLOPRIM) tablet 300 mg  300 mg Oral DAILY  calcium carbonate (OS-CHRIS) tablet 500 mg [elemental]  500 mg Oral TID WITH MEALS  escitalopram oxalate (LEXAPRO) tablet 10 mg  10 mg Oral DAILY  fluconazole (DIFLUCAN) tablet 200 mg  200 mg Oral DAILY  lidocaine-prilocaine (EMLA) 2.5-2.5 % cream   Topical PRN  
 LORazepam (ATIVAN) tablet 1 mg  1 mg Oral QHS  ondansetron (ZOFRAN ODT) tablet 8 mg  8 mg Oral Q8H PRN  pravastatin (PRAVACHOL) tablet 10 mg  10 mg Oral QHS  vit C-E-zinc cit-lutein-zeaxan 50 mg-15 unit- 4.5 mg-2.5 mg chew (OCU-ABDOUL) 1 Tab (Patient Supplied)  1 Tab Oral DAILY ASSESSMENT: 
 
Problem List  Date Reviewed: 4/30/2019 Codes Class Noted Acute myeloid leukemia not having achieved remission ([address] ICD-10-CM: C92.00 ICD-9-CM: 205.00  5/9/2019 Admission for antineoplastic chemotherapy ICD-10-CM: Z51.11 ICD-9-CM: V58.11  5/5/2019 AML (acute myeloblastic leukemia) ([facility] ICD-10-CM: C92.00 ICD-9-CM: 205.00  4/28/2019 Weakness generalized ICD-10-CM: R53.1 ICD-9-CM: 780.79  4/28/2019 Pancytopenia ([facility] ICD-10-CM: Y88.840 ICD-9-CM: 284.19  4/28/2019 Thrombocytopenia ([facility] ICD-10-CM: D69.6 ICD-9-CM: 287.5  4/27/2019 PLAN: 
AML FLT 3+ 
5/6  BMbx planned for Tuesday then wait for [facility] from port results to determine start date of  cycle one 7 + 3 with Midostaurin day 8-21. Platelets will need to be at least 50k for bx tomorrow 5/7. BMbx today-platelets prior. Also needed prbc today for hgb 6.9. 
5/08 Start 7+3 when afebrile per Dr. Sanna Chávez. 5/09 Day 1 of 7+3. Monitor TLS labs. 5/16 Day 8 7+3. Day one Rydapt. 5/30 Day 22 7+3, Completed Midostaurin today. 6/6 Day 29 7+3. Awaiting count recovery, she will need a repeat BMbx after count recovery. Pancytopenia related to chemotherapy 5/16 transfuse per [facility] SOPs 
  
Possible port infection 5/5 Day one vanc/cefe. Follow cultures. Do not use port. May need to have Echo if cultures positive. 5/6 BCNTD. Appears improved today. Continue to monitor. No fevers or other symptoms. 5/7 Day 3 vanc/cefe. Site appears even better today. 5/8 Does not appear infected. Site bruised. 5/9 BCx negative. Per ID okay to start treatment. 5/13 Completed 7 days Vanc/cef. Now on levaquin per ID. 5/20 BC from port +GPC alpha strep-ID consulted 5/21 BC repeated today. ID following for final read on previous cultures to determine if port needs to be removed. ECHO pending.  
5/22 Echo with no vegetation. BC + strep parasanguinis-ID following. Repeat BC NTD 
5/23 ID changed abx to Rocephin x 2 weeks EOT 6/15/19 then repeat BC x 2 post 7 days tx. No port removal needed. 6/4 Con't Rocephin (EOT 6/5) then repeat BCx ~ 7 days after therapy completed x 2, 24 hours apart Neutropenic Fever 05/05 Pan-culture negative. On Vancomycin, Maxipime 05/08 Febrile overnight up to 102.5. Repeat cultures x 1. Continue antibiotics. Likely disease related. Consult ID for clearance. 05/09 BC remain negative. No fevers 48 hours. Continue vanc/cefe. 05/13 now only on LVQ per ID. 5/18 Neutropenic fever - 100.5. Pan-cultures obtained. Chest xray negative. Started on Vanc/Cef. 5/19 Afebrile. Port culture with gram positive cocci in chains, await further studies. Continue current antibiotics. 5/20 port +BC with GPC alpha strep-ID consulted 5/23 ID changed abx to Rocephin x 2 weeks EOT 6/5/19 then repeat BC x 2 post 7 days tx. No port removal needed. 6/4 Remains afebrile. Con't Rocephin (EOT 6/5) then repeat BCx ~ 7 (6/12 and 6/13) days after therapy completed x 2, 24 hours apart 6/5 Completes rocephin. Resume prophylactic levaquin Bradycardia 5/14 EKG with no significant change. Skin Changes 05/26 Hydrocortisone to hands. Anti-fungal cream to area of ring worm. Constipation 6/6 Start miralax and aleena-colace Blister 6/6 Blister with surrounding erythema noted below port dressing. Neosporin and clindamycin ordered. [facility] SOPs Supportive care ACV/Diflucan/LVQ Disposition:  Anticipate discharge home tomorrow with plans for sedated OP BMbx next week. Goals and plan of care reviewed with the patient. All questions answered to the best of our ability. Henok Fofana NP [facility] Hematology & Oncology [address] [place] [address] Office : [phone] Fax : [phone] Attending Addendum: 
I have personally performed a face to face diagnostic evaluation on this patient.  I have reviewed and agree with the care plan as documented above by Henok Fofana, N.MIHIR.  My findings are as follows: Patient appears stable, heart rate regular without murmurs, abdomen is non-tender, bowel sounds are positive. Elderly lady h/o AML FLT3 +ve, s/p 7+3+midostaurin. Day 29. Await count recovery. Will get BM biopsy at that time. If fails to recover then BM next week. Strep parasanguinis bacteremia, completed rocephin. Continue other prophylactic abx. Transfuse as needed. Ambulation encouraged. Continue current care. May consider discharge later in week. Total time 35 min 50% in direct consultation about the patient's diagnosis and management Santa Sanchez MD 
[facility] Hematology/Oncology [address] [place] [address] Office : [phone] Fax : [phone]

## 2019-06-06 NOTE — PROGRESS NOTES
Nutrition Follow Up Assessment Diet: DIET REGULAR Pt continues to eat well. No c/o barriers to intake. She states that meals served continue to be larger than what she is used to eating at home so she will often request not to receive an item. No nutrition related questions/concerns. Anthropometrics: Height: 5' 6\" (167.6 cm), Weight Source: Standing scale (comment), Weight: 83.5 kg (184 lb 1.6 oz). Macronutrient Needs: 
· EER:  8343-3991 kcal /day (15-20 kcal/kg listed BW) · EPR:   grams protein/day (1-1.2 grams/kg listed BW) Intake/Comparative Standards:Average intake for past 7 days:  18 recorded meals: 100%. This potentially meets 100% of kcal and 1000% of protein needs. Intervention:  
Meals and snacks: Continue current diet. Discharge Nutrition Plan: No discharge needs at this time. Aster Yusuf [address], [address] [place] [address], [facility] [address], [address] [facility]

## 2019-06-06 NOTE — PROGRESS NOTES
END OF SHIFT NOTE: 
 
Intake/Output No intake/output data recorded. Voiding: YES Catheter: NO 
Drain:   
 
 
 
 
 
Stool:  0 occurrences. Stool Assessment Stool Color: Brown(per pt) (05/24/19 4707) Stool Appearance: Soft(per patient) (06/05/19 0069) Stool Amount: Medium(per pt) (05/24/19 3400) Stool Source/Status: Rectum (05/24/19 1977) Emesis:  0 occurrences. Emesis Assessment Appearance: Undigested food (05/18/19 0258) Emesis Amount: Small (05/18/19 0258) VITAL SIGNS Patient Vitals for the past 12 hrs: 
 Temp Pulse Resp BP SpO2  
06/06/19 0332 98.4 °F (36.9 °C) 73 16 146/78 98 % 06/05/19 2326 98.2 °F (36.8 °C) 81 18 126/59 97 % 06/05/19 2004 98.9 °F (37.2 °C) 90 18 148/69 96 % Pain Assessment Pain 1 Pain Scale 1: Visual (06/06/19 0234) Pain Intensity 1: 0 (06/06/19 0234) Patient Stated Pain Goal: 0 (06/06/19 0234) Pain Reassessment 1: Patient resting w/respiratory rate greater than 10 (06/06/19 0234) Pain Onset 1: now (05/17/19 1600) Pain Location 1: Chest (05/17/19 1600) Pain Orientation 1: Mid (05/17/19 1600) Pain Description 1: Sore (05/17/19 1600) Pain Intervention(s) 1: Medication (see MAR) (05/17/19 1600) Ambulating Yes Additional Information: 
 
VSS/afebrile. Slept well. No complaints of pain through the night. Ambulated halls. Very pleasant. No change in WBC this AM. Blister noted to bottom of port dressing - still intact. Pt instructed not to try and pop it. D 29 7+3 Started back on prophylactic LVQ this AM 
 
Shift report given to oncoming nurse at the bedside.  
 
John Martell, RN

## 2019-06-07 VITALS
RESPIRATION RATE: 16 BRPM | SYSTOLIC BLOOD PRESSURE: 133 MMHG | OXYGEN SATURATION: 98 % | DIASTOLIC BLOOD PRESSURE: 75 MMHG | HEART RATE: 76 BPM | BODY MASS INDEX: 29.68 KG/M2 | TEMPERATURE: 97.9 F | WEIGHT: 184.7 LBS | HEIGHT: 66 IN

## 2019-06-07 LAB
ABO + RH BLD: NORMAL
ALBUMIN SERPL-MCNC: 3.4 G/DL (ref 3.2–4.6)
ALBUMIN/GLOB SERPL: 1.1 {RATIO} (ref 1.2–3.5)
ALP SERPL-CCNC: 108 U/L (ref 50–136)
ALT SERPL-CCNC: 22 U/L (ref 12–65)
ANION GAP SERPL CALC-SCNC: 7 MMOL/L (ref 7–16)
AST SERPL-CCNC: 11 U/L (ref 15–37)
BILIRUB SERPL-MCNC: 0.3 MG/DL (ref 0.2–1.1)
BLD PROD TYP BPU: NORMAL
BLOOD GROUP ANTIBODIES SERPL: NORMAL
BPU ID: NORMAL
BUN SERPL-MCNC: 14 MG/DL (ref 8–23)
CALCIUM SERPL-MCNC: 8.8 MG/DL (ref 8.3–10.4)
CHLORIDE SERPL-SCNC: 106 MMOL/L (ref 98–107)
CO2 SERPL-SCNC: 27 MMOL/L (ref 21–32)
CREAT SERPL-MCNC: 0.73 MG/DL (ref 0.6–1)
CROSSMATCH RESULT,%XM: NORMAL
DIFFERENTIAL METHOD BLD: ABNORMAL
ERYTHROCYTE [DISTWIDTH] IN BLOOD BY AUTOMATED COUNT: 13.1 % (ref 11.9–14.6)
GLOBULIN SER CALC-MCNC: 3 G/DL (ref 2.3–3.5)
GLUCOSE SERPL-MCNC: 96 MG/DL (ref 65–100)
HCT VFR BLD AUTO: 22.9 % (ref 35.8–46.3)
HGB BLD-MCNC: 7.8 G/DL (ref 11.7–15.4)
MAGNESIUM SERPL-MCNC: 2.3 MG/DL (ref 1.8–2.4)
MCH RBC QN AUTO: 29.7 PG (ref 26.1–32.9)
MCHC RBC AUTO-ENTMCNC: 34.1 G/DL (ref 31.4–35)
MCV RBC AUTO: 87.1 FL (ref 79.6–97.8)
NRBC # BLD: 0 K/UL (ref 0–0.2)
PLATELET # BLD AUTO: 13 K/UL (ref 150–450)
PLATELET COMMENTS,PCOM: ABNORMAL
PMV BLD AUTO: 10.7 FL (ref 9.4–12.3)
POTASSIUM SERPL-SCNC: 4.1 MMOL/L (ref 3.5–5.1)
PROT SERPL-MCNC: 6.4 G/DL (ref 6.3–8.2)
RBC # BLD AUTO: 2.63 M/UL (ref 4.05–5.2)
RBC MORPH BLD: ABNORMAL
SODIUM SERPL-SCNC: 140 MMOL/L (ref 136–145)
SPECIMEN EXP DATE BLD: NORMAL
STATUS OF UNIT,%ST: NORMAL
UNIT DIVISION, %UDIV: 0
WBC # BLD AUTO: 0.3 K/UL (ref 4.3–11.1)
WBC MORPH BLD: ABNORMAL

## 2019-06-07 PROCEDURE — 99239 HOSP IP/OBS DSCHRG MGMT >30: CPT | Performed by: INTERNAL MEDICINE

## 2019-06-07 PROCEDURE — 36591 DRAW BLOOD OFF VENOUS DEVICE: CPT

## 2019-06-07 PROCEDURE — 74011250637 HC RX REV CODE- 250/637: Performed by: NURSE PRACTITIONER

## 2019-06-07 PROCEDURE — 80053 COMPREHEN METABOLIC PANEL: CPT

## 2019-06-07 PROCEDURE — 86644 CMV ANTIBODY: CPT

## 2019-06-07 PROCEDURE — 74011250636 HC RX REV CODE- 250/636: Performed by: INTERNAL MEDICINE

## 2019-06-07 PROCEDURE — 74011250636 HC RX REV CODE- 250/636: Performed by: NURSE PRACTITIONER

## 2019-06-07 PROCEDURE — 74011250637 HC RX REV CODE- 250/637: Performed by: INTERNAL MEDICINE

## 2019-06-07 PROCEDURE — P9037 PLATE PHERES LEUKOREDU IRRAD: HCPCS

## 2019-06-07 PROCEDURE — 85025 COMPLETE CBC W/AUTO DIFF WBC: CPT

## 2019-06-07 PROCEDURE — 36430 TRANSFUSION BLD/BLD COMPNT: CPT

## 2019-06-07 PROCEDURE — 77030039270 HC TU BLD FLTR CARD -A

## 2019-06-07 PROCEDURE — 83735 ASSAY OF MAGNESIUM: CPT

## 2019-06-07 RX ORDER — ALLOPURINOL 300 MG/1
300 TABLET ORAL DAILY
Qty: 30 TAB | Refills: 0 | Status: SHIPPED | OUTPATIENT
Start: 2019-06-07 | End: 2019-07-09 | Stop reason: SDUPTHER

## 2019-06-07 RX ORDER — LEVOFLOXACIN 500 MG/1
500 TABLET, FILM COATED ORAL EVERY 24 HOURS
Qty: 30 TAB | Refills: 0 | Status: SHIPPED | OUTPATIENT
Start: 2019-06-07 | End: 2019-07-09 | Stop reason: SDUPTHER

## 2019-06-07 RX ORDER — CYANOCOBALAMIN (VITAMIN B-12) 500 MCG
800 TABLET ORAL DAILY
Qty: 60 TAB | Refills: 0 | Status: SHIPPED | OUTPATIENT
Start: 2019-06-07

## 2019-06-07 RX ORDER — CLINDAMYCIN HYDROCHLORIDE 300 MG/1
300 CAPSULE ORAL 4 TIMES DAILY
Qty: 16 CAP | Refills: 0 | Status: SHIPPED | OUTPATIENT
Start: 2019-06-07 | End: 2019-06-12 | Stop reason: ALTCHOICE

## 2019-06-07 RX ORDER — CHLORHEXIDINE GLUCONATE 1.2 MG/ML
15 RINSE ORAL 2 TIMES DAILY
Qty: 420 ML | Refills: 0 | Status: SHIPPED | OUTPATIENT
Start: 2019-06-07 | End: 2019-07-18 | Stop reason: ALTCHOICE

## 2019-06-07 RX ORDER — SODIUM CHLORIDE 9 MG/ML
250 INJECTION, SOLUTION INTRAVENOUS AS NEEDED
Status: DISCONTINUED | OUTPATIENT
Start: 2019-06-07 | End: 2019-06-07 | Stop reason: HOSPADM

## 2019-06-07 RX ORDER — FLUCONAZOLE 200 MG/1
200 TABLET ORAL DAILY
Qty: 30 TAB | Refills: 0 | Status: SHIPPED | OUTPATIENT
Start: 2019-06-07 | End: 2019-07-09 | Stop reason: SDUPTHER

## 2019-06-07 RX ORDER — ACYCLOVIR 400 MG/1
400 TABLET ORAL 2 TIMES DAILY
Qty: 60 TAB | Refills: 0 | Status: SHIPPED | OUTPATIENT
Start: 2019-06-07 | End: 2019-07-09 | Stop reason: SDUPTHER

## 2019-06-07 RX ADMIN — Medication 800 UNITS: at 09:00

## 2019-06-07 RX ADMIN — CLINDAMYCIN HYDROCHLORIDE 300 MG: 150 CAPSULE ORAL at 13:31

## 2019-06-07 RX ADMIN — PANTOPRAZOLE SODIUM 40 MG: 40 TABLET, DELAYED RELEASE ORAL at 10:34

## 2019-06-07 RX ADMIN — ACYCLOVIR 400 MG: 800 TABLET ORAL at 10:37

## 2019-06-07 RX ADMIN — LEVOFLOXACIN 500 MG: 500 TABLET, FILM COATED ORAL at 05:38

## 2019-06-07 RX ADMIN — CLINDAMYCIN HYDROCHLORIDE 300 MG: 150 CAPSULE ORAL at 10:32

## 2019-06-07 RX ADMIN — Medication 500 MG: at 08:00

## 2019-06-07 RX ADMIN — SUCRALFATE 1 G: 1 TABLET ORAL at 10:34

## 2019-06-07 RX ADMIN — ESCITALOPRAM OXALATE 10 MG: 10 TABLET ORAL at 10:36

## 2019-06-07 RX ADMIN — FLUCONAZOLE 200 MG: 100 TABLET ORAL at 09:00

## 2019-06-07 RX ADMIN — POTASSIUM CHLORIDE 20 MEQ: 20 TABLET, EXTENDED RELEASE ORAL at 10:37

## 2019-06-07 RX ADMIN — DIPHENHYDRAMINE HYDROCHLORIDE 25 MG: 25 CAPSULE ORAL at 12:25

## 2019-06-07 RX ADMIN — SENNOSIDES AND DOCUSATE SODIUM 1 TABLET: 8.6; 5 TABLET ORAL at 10:33

## 2019-06-07 RX ADMIN — SODIUM CHLORIDE, PRESERVATIVE FREE 300 UNITS: 5 INJECTION INTRAVENOUS at 05:38

## 2019-06-07 RX ADMIN — SODIUM CHLORIDE 250 ML: 900 INJECTION, SOLUTION INTRAVENOUS at 13:31

## 2019-06-07 RX ADMIN — POLYMYXIN B SULFATE, BACITRACIN ZINC, NEOMYCIN SULFATE: 5000; 3.5; 4 OINTMENT TOPICAL at 10:40

## 2019-06-07 RX ADMIN — ACETAMINOPHEN 650 MG: 325 TABLET, FILM COATED ORAL at 12:24

## 2019-06-07 RX ADMIN — ALLOPURINOL 300 MG: 300 TABLET ORAL at 10:37

## 2019-06-07 NOTE — PROGRESS NOTES
Pt is being discharged home No needs from case management at time of discharge Milestones Met Care Management Interventions PCP Verified by CM: Yes Mode of Transport at Discharge: Self Transition of Care Consult (CM Consult): Discharge Planning Discharge Durable Medical Equipment: No 
Physical Therapy Consult: Yes Occupational Therapy Consult: Yes Speech Therapy Consult: No 
Current Support Network: Own Home, Lives with Spouse Confirm Follow Up Transport: Family Plan discussed with Pt/Family/Caregiver: Yes Freedom of Choice Offered: Yes [employer] Information Provided?: No 
Discharge Location Discharge Placement: Home

## 2019-06-07 NOTE — DISCHARGE SUMMARY
[facility] Hematology & Oncology: Inpatient Hematology / Oncology Discharge Summary Note Patient ID: Lisa Abreu [id number] 
59 y.o. 
1945 Admit Date: 5/5/2019 Discharge Date: 6/7/2019 Admission Diagnoses: Admission for antineoplastic chemotherapy [Z51.11] Discharge Diagnoses: 
Principal Diagnosis: <principal problem not specified> Active Problems: 
  Admission for antineoplastic chemotherapy (5/5/2019) Acute myeloid leukemia not having achieved remission ([facility] [address] (5/9/2019) Hospital Course: Ms. Yu is a 68 y. o. female admitted on 5/5/2019 for cycle one 7+3. She had a recent BMbx at [facility] on 4/24 which revealed AML with FLT3+. Patient was considered for clinical trial [id number]. but in order to qualify for trial she would need to be FLT3 negative. She was admitted for induction with 7+3 and Midostaurin. She was found to have strep bacteremia and was treated with Rocephin until 6/5. Per ID, she will need repeat BCx on 6/12 and 6/13. She has a blister with surrounding erythema below her port which has improved on clindamycin which she will complete at home upon discharge. Still awaiting count recovery, however Dr. Pippa Roman would like to discharge her home after platelet transfusion today. She is feeling well and is ready for discharge. She is scheduled for twice weekly provider visits next week as well as labs/replacements 3x weekly. OP order for BMbx has been requested for next week. Advised to call with fever, chills, uncontrollable symptoms, or with any other concerns. Pt verbalizes understanding. AML FLT 3+ 
5/6  BMbx planned for Tuesday then wait for [facility] from port results to determine start date of  cycle one 7 + 3 with Midostaurin day 8-21. Platelets will need to be at least 50k for bx tomorrow 5/7. BMbx today-platelets prior. Also needed prbc today for hgb 6.9. 
5/08 Start 7+3 when afebrile per Dr. Pippa Roman. 5/09 Day 1 of 7+3. Monitor TLS labs. 5/16 Day 8 7+3. Day one Rydapt. 5/30 Day 22 7+3, Completed Midostaurin today. 6/6 Day 29 7+3. Awaiting count recovery, she will need a repeat BMbx after count recovery. 
  
Pancytopenia related to chemotherapy 5/16 transfuse per Alexander SOPs 
  
Possible port infection 5/5 Day one vanc/cefe. Follow cultures. Do not use port. May need to have Echo if cultures positive.  
5/6 BCNTD. Appears improved today. Continue to monitor. No fevers or other symptoms. 5/7 Day 3 vanc/cefe. Site appears even better today. 5/8 Does not appear infected. Site bruised. 5/9 BCx negative. Per ID okay to start treatment. 5/13 Completed 7 days Vanc/cef. Now on levaquin per ID. 5/20 BC from port +GPC alpha strep-ID consulted 5/21 BC repeated today. ID following for final read on previous cultures to determine if port needs to be removed. ECHO pending.  
5/22 Echo with no vegetation. BC + strep parasanguinis-ID following. Repeat BC NTD 
5/23 ID changed abx to Rocephin x 2 weeks EOT 6/15/19 then repeat BC x 2 post 7 days tx. No port removal needed. 6/4 Con't Rocephin (EOT 6/5) then repeat BCx ~ 7 days after therapy completed x 2, 24 hours apart 
  
Neutropenic Fever 05/05 Pan-culture negative. On Vancomycin, Maxipime 05/08 Febrile overnight up to 102.5. Repeat cultures x 1. Continue antibiotics. Likely disease related. Consult ID for clearance. 05/09 BC remain negative. No fevers 48 hours. Continue vanc/cefe. 05/13 now only on LVQ per ID. 5/18 Neutropenic fever - 100.5. Pan-cultures obtained. Chest xray negative. Started on Vanc/Cef. 5/19 Afebrile. Port culture with gram positive cocci in chains, await further studies. Continue current antibiotics. 5/20 port +BC with GPC alpha strep-ID consulted 5/23 ID changed abx to Rocephin x 2 weeks EOT 6/5/19 then repeat BC x 2 post 7 days tx. No port removal needed. 6/4 Remains afebrile.  Con't Rocephin (EOT 6/5) then repeat BCx ~ 7 (6/12 and 6/13) days after therapy completed x 2, 24 hours apart 6/5 Completes rocephin. Resume prophylactic levaquin 
  
Bradycardia 5/14 EKG with no significant change. 
  
Skin Changes 05/26 Hydrocortisone to hands. Anti-fungal cream to area of ring worm.  
  
Constipation 6/6 Start miralax and aleena-colace 
  
Blister 6/6 Blister with surrounding erythema noted below port dressing. Neosporin and clindamycin ordered. Consults: 
IP CONSULT TO INTERVENTIONAL RADIOLOGY 
IP CONSULT TO INFECTIOUS DISEASES 
IP CONSULT TO INFECTIOUS DISEASES Pertinent Diagnostic Studies:  
Labs:   
Recent Labs  
  06/07/19 0308 06/06/19 0319 06/05/19 
8886 WBC 0.3* 0.2* 0.2* HGB 7.8* 7.3* 7.2*  
PLT 13* 19* 15* Recent Labs  
  06/07/19 0308 06/06/19 0319 06/05/19 
7513  140 141  
K 4.1 4.0 4.0  
 107 106 CO2 27 26 27 GLU 96 99 95 BUN 14 13 13 CREA 0.73 0.70 0.69 CA 8.8 8.6 8.7 SGOT 11* 13* 12*  111 105  
TP 6.4 6.7 6.4 ALB 3.4 3.6 3.4 MG 2.3 2.3 2.4 Imaging: XR CHEST SNGL V [[id number]] Collected: 05/18/19 0855 Order Status: Completed Updated: 05/18/19 9671 Narrative:    
EXAM: Single view chest radiograph. INDICATION: 73 years Fever COMPARISON: None. FINDINGS: 
No focal lung opacity. No pneumothorax. No pleural effusion. The heart, 
mediastinum, jhoana, and pulmonary vasculature are within normal limits. No 
evidence of acute osseous abnormality. Right-sided chest port with tip 
terminating in the right atrium. Impression:    
IMPRESSION:  
1. No evidence of pneumonia. CT BX BONE MARROW AND ASPIRATION [[id number]] Collected: 05/07/19 1351 Order Status: Completed Updated: 05/07/19 9785 Narrative:    
Title: CT guided bone marrow aspiration and core biopsy. History: 68year old female with AML.    
 
: Amberly Wei PA-C Supervising Physician: Kimmy Hooker M.D. 
 
 Radiation dose reduction techniques were used for this study. The [facility] CT scanner use one or all of the following: Automated exposure control, 
adjustment of the mA and/or kV according to patient size, iterative 
reconstruction. Consent: Informed written and oral consent was obtained from the patient after 
explanation of benefits and risks (including, but not limited to:  Infection, Hemorrhage, Visceral Injury).  The patient's questions were answered to their 
satisfaction.  The patient stated understanding and requested that we proceed.   
 
Procedure: Maximal sterile barrier technique was used.  With the patient prone a 
preliminary CT scan through the pelvis was performed with radio-opaque markers 
on the skin. Following routine prep and drape of the right buttock, a local field block with 
lidocaine was achieved. Using intermittent CT technique, a marrow aspirate followed by a core biopsy was 
obtained with the 11 gauge On Control biopsy device. The needle was removed. Hemostasis was achieved with manual compression. A 
bandage was applied. Total Exam .42 mGy-cm Complications: None. Medications: Under the direction supervision of Coretta Burton M.D., 20 
minutes of moderate sedation was administered using Versed 1.5 mg and fentanyl 
100 mcg. Continuous pulse oximetry, CO2, heart rate and blood pressure were 
monitored by a trained independent observer present. Findings: No post biopsy hemorrhage. Impression:    
Impression: Uncomplicated CT guided bone marrow aspiration and core biopsy. Plan:  Bedrest observation for one hour Current Discharge Medication List  
  
START taking these medications Details  
cholecalciferol (VITAMIN D3) 400 unit tab tablet Take 2 Tabs by mouth daily. Qty: 60 Tab, Refills: 0  
  
chlorhexidine (PERIDEX) 0.12 % solution Take 15 mL by mouth two (2) times a day. Qty: 420 mL, Refills: 0 clindamycin (CLEOCIN) 300 mg capsule Take 1 Cap by mouth four (4) times daily. Qty: 16 Cap, Refills: 0 CONTINUE these medications which have CHANGED Details  
acyclovir (ZOVIRAX) 400 mg tablet Take 1 Tab by mouth two (2) times a day for 30 days. Qty: 60 Tab, Refills: 0  
  
allopurinol (ZYLOPRIM) 300 mg tablet Take 1 Tab by mouth daily for 30 days. Qty: 30 Tab, Refills: 0  
  
levoFLOXacin (LEVAQUIN) 500 mg tablet Take 1 Tab by mouth every twenty-four (24) hours for 30 days. Qty: 30 Tab, Refills: 0  
  
fluconazole (DIFLUCAN) 200 mg tablet Take 1 Tab by mouth daily for 30 days. FDA advises cautious prescribing of oral fluconazole in pregnancy. Qty: 30 Tab, Refills: 0 CONTINUE these medications which have NOT CHANGED Details  
lidocaine-prilocaine (EMLA) topical cream Apply  to affected area as needed for Pain. Apply to port site 45-60 minutes prior to lab appt or infusion 
Qty: 30 g, Refills: 0  
  
ondansetron hcl (ZOFRAN) 8 mg tablet Take 1 Tab by mouth every eight (8) hours as needed for Nausea. Qty: 90 Tab, Refills: 0  
  
vit C/E/zinc/lutein/zeaxanthin ([address] PO) Take 1 Tab by mouth daily. escitalopram oxalate (LEXAPRO) 10 mg tablet Take 10 mg by mouth daily. LORazepam (ATIVAN) 1 mg tablet Take  by mouth nightly. acetaminophen 500 mg tab 500 mg, diphenhydrAMINE 25 mg cap 25 mg Take  by mouth nightly. pravastatin (PRAVACHOL) 10 mg tablet Take  by mouth nightly. STOP taking these medications  
  
 calcium carbonate (OS-CHRIS) 500 mg calcium (1,250 mg) tablet Comments:  
Reason for Stopping:   
   
 cholecalciferol (VITAMIN D3) 50,000 unit capsule Comments:  
Reason for Stopping:   
   
 midostaurin (RYDAPT) 25 mg capsule Comments:  
Reason for Stopping: OBJECTIVE: 
Patient Vitals for the past 8 hrs: 
 BP Temp Pulse Resp SpO2  
06/07/19 0716 140/55 97.9 °F (36.6 °C) 84 17 96 % 06/07/19 0330 121/63 98.2 °F (36.8 °C) 76 16 96 % Temp (24hrs), Av.3 °F (36.8 °C), Min:97.9 °F (36.6 °C), Max:98.7 °F (37.1 °C) 
 
701 - 0 In: 120 [P.O.:120] Out: - Physical Exam: 
Constitutional: Well developed, well nourished female in no acute distress, sitting comfortably on the hospital bed.  at bedside. HEENT: Normocephalic and atraumatic. Oropharynx is clear, mucous membranes are moist.  Extraocular muscles are intact. Sclerae anicteric. Neck supple without JVD. No thyromegaly present. Skin Warm and dry. No bruising and no rash noted. No erythema. No pallor. Blister below port dressing with surrounding erythema -improving. Respiratory Lungs are clear to auscultation bilaterally without wheezes, rales or rhonchi, normal air exchange without accessory muscle use. CVS Normal rate, regular rhythm and normal S1 and S2. No murmurs, gallops, or rubs. Abdomen Soft, nontender and nondistended, normoactive bowel sounds. No palpable mass. No hepatosplenomegaly. Neuro Grossly nonfocal with no obvious sensory or motor deficits. MSK Normal range of motion in general.  No edema and no tenderness. Psych Appropriate mood and affect. ASSESSMENT: 
 
Active Problems: 
  Admission for antineoplastic chemotherapy (2019) Acute myeloid leukemia not having achieved remission ([facility] [address] (2019) DISPOSITION: 
Follow-up Appointments Procedures  FOLLOW UP VISIT Appointment in: Other (Specify) Please schedule: 6/10 labs/replacements/follow-up with provider  labs/replacements  labs/replacments/follow-up with provider Please schedule: 
 
6/10 labs/replacements/follow-up with provider  labs/replacements  labs/replacments/follow-up with provider Standing Status:   Standing Number of Occurrences:   1 Order Specific Question:   Appointment in Answer: Other (Specify) Over 30 minutes was spent in discharge planning and coordination of care. Jonatan Kearns NP [facility] Hematology & Oncology [address] [place] [address] Office : [phone] Fax : [phone] Attending Addendum: 
I have personally performed a face to face diagnostic evaluation on this patient. I have reviewed and agree with the care plan as documented by Jonatan Kearns N.P. Patient appears table, heart rate regular without murmurs, abdomen is non-tender, bowel sounds are positive. Elderly lady h/o AML FLT3 +ve, s/p 7+3+midostaurin. Day 30. Awaiting count recovery. Strep parasanguinis bacteremia, completed rocephin. Continue other prophylactic abx. Clinically stable for discharge. Will get labs 3 times per week as well as provider visits twice weekly. BM biopsy early next week. I spent 32 minutes on evaluation, management, counseling and discharge planning on patient. Blessing Conde MD 
[facility] Hematology/Oncology [id number] [facility] Office : [phone] Fax : [phone]

## 2019-06-07 NOTE — DISCHARGE INSTRUCTIONS
Managing Side Effects of Chemotherapy: Care Instructions  Your Care Instructions    Cancer is often treated with medicines that destroy the cancer cells (chemotherapy). These medicines may slow cancer growth and prevent or stop the spread of cancer. Chemotherapy also can affect healthy cells and cause side effects. Most people can work and do their normal activities after and even during chemotherapy, but they may need to limit their schedules. Side effects of chemotherapy may include nausea and vomiting, loss of appetite, pain, and being tired. Some medicines can cause diarrhea or mouth sores. Your doctor may prescribe medicines to treat the side effects. Your doctor will advise you to take extra care to prevent illnesses and infections, because chemotherapy weakens your natural defenses. Follow-up care is a key part of your treatment and safety. Be sure to make and go to all appointments, and call your doctor if you are having problems. It's also a good idea to know your test results and keep a list of the medicines you take. How can you care for yourself at home? Medicines    · Take your medicines exactly as prescribed. Call your doctor if you think you are having a problem with your medicine. You may get medicine for nausea and vomiting if you have these side effects.    Nausea and vomiting    · A light meal or snack before chemotherapy may help prevent nausea. If you do have nausea during your treatment, try eating earlier--at least an hour or two before your next treatment. After your treatment, you may want to wait one or more hours before you eat again.     · Drink fluids with your meals and an hour before or after meals.     · After vomiting has stopped for 1 hour, sip a rehydration drink, such as Powerade or Gatorade.     · Drink plenty of fluids to prevent dehydration. Choose water and other caffeine-free clear liquids until you feel better.  Try clear fluids, such as apple or grape juice mixed to half strength with water, rehydration drinks, weak tea with sugar, clear broth, and gelatin dessert. Do not drink citrus juices. If you have kidney, heart, or liver disease and have to limit fluids, talk with your doctor before you increase the amount of fluids you drink.     · When you are feeling better, begin eating clear soups and mild foods until all symptoms are gone for 12 to 48 hours. Other good choices include dry toast, crackers, cooked cereal, and gelatin dessert, such as Jell-O.     · If your vomiting is not getting better or is getting worse, call your doctor right away.    Loss of appetite    · It's important to eat healthy food. If you do not feel like eating, try to eat food that has protein and extra calories to keep up your strength and prevent weight loss. You can drink liquid meal replacements for extra calories and protein.     · Try eating several smaller meals throughout the day. Set a schedule for meals and snacks, and plan for times when it feels best to eat. Try to eat your main meal early.     · After treatment, you may want to wait for a while to eat. You can also try eating earlier before treatment.     · Try to eat more of the foods you like during the days and times when your appetite is good.     · When you don't feel like eating your normal foods, try clear broths/soups and mild foods like toast, crackers, cooked cereal like oatmeal, and gelatin dessert. Eating soft, bland foods may help.    Pain control    · If your doctor prescribes medicines to control pain, take them as directed. Often your doctor will have you take these medicines regularly to keep your pain under control. Medicine for pain may cause side effects. Let your doctor know if you feel constipated, have trouble urinating, or have nausea.     · Try using relaxation exercises to lower your anxiety and stress, which can increase pain.     · Keep track of your pain so you can tell your doctor what your pain is like.  Write down where you feel pain, how long it lasts, what seems to bring it on, and how it feels. Also note what makes the pain feel better or worse.     · If you have mouth pain, your doctor may prescribe a special mouth rinse that can help relieve the pain.    Weakness and feeling tired    · Get extra rest. Plan ahead so you can take breaks or naps.     · Save your energy for the most important things you want to do.     · Try to get some exercise, such as walking, but stop if you are too tired.     · Eat a balanced diet. Do not skip meals, especially breakfast.     · Do something you enjoy. Do you like to listen to music? Spend some time listening to your favorite music. Or find another way to relax by reading, watching a movie, or playing games.     · Ask family and friends to help with home chores and other tasks.    To prevent infections    · Wash your hands often during the day, especially before you eat and after you use the bathroom.     · Stay away from people who have illnesses that you might catch, such as the flu or a cold.     · Try to stay out of crowds.     · Clean cuts and scrapes right away with warm water and soap. Clean them daily until they are healed.     · Keep track of your temperature, if your doctor recommends it. You can do this by taking your temperature at regular times and writing it down.    Hair loss    · Use a mild shampoo and a soft hair brush.     · Use a low setting on your hair dryer. Do not color or perm your hair.     · Have your hair cut short. It will look thicker and verduzco, and it will not be such a shock if you lose hair.     · Use sunscreen and a hat, scarf, or turban to protect your scalp from the sun.     · Ask your doctor about other treatments that you may try to prevent or minimize hair loss. These may include the use of a cooling cap. When should you call for help? Call 911 anytime you think you may need emergency care.  For example, call if:    · You passed out (lost consciousness).    Call your doctor now or seek immediate medical care if:    · You have a fever.     · You have abnormal bleeding.     · You have new or worse pain.     · You think you have an infection.     · You have new symptoms, such as a cough, belly pain, vomiting, diarrhea, or a rash.    Watch closely for changes in your health, and be sure to contact your doctor if:    · You are much more tired than usual.     · You have swollen glands in your armpits, groin, or neck.     · You do not get better as expected. Where can you learn more? Go to http://cece-ahsan.info/. Enter ([phone] 3050-394 in the search box to learn more about \"Managing Side Effects of Chemotherapy: Care Instructions. \"  Current as of: March 27, 2018  Content Version: 11.9  © 2312-9165 [employer]. Care instructions adapted under license by [employer] (which disclaims liability or warranty for this information). If you have questions about a medical condition or this instruction, always ask your healthcare professional. [place] [address] any warranty or liability for your use of this information. DISCHARGE SUMMARY from Nurse    PATIENT INSTRUCTIONS:    After general anesthesia or intravenous sedation, for 24 hours or while taking prescription Narcotics:  · Limit your activities  · Do not drive and operate hazardous machinery  · Do not make important personal or business decisions  · Do  not drink alcoholic beverages  · If you have not urinated within 8 hours after discharge, please contact your surgeon on call.     Report the following to your surgeon:  · Excessive pain, swelling, redness or odor of or around the surgical area  · Temperature over 100.5  · Nausea and vomiting lasting longer than 4 hours or if unable to take medications  · Any signs of decreased circulation or nerve impairment to extremity: change in color, persistent  numbness, tingling, coldness or increase pain  · Any questions    What to do at Home:  Recommended activity: Activity as tolerated. If you experience any of the following symptoms:  Fever greater than 100.5,  Pain or nausea/ vomiting not controlled by your medication,  please follow up with your doctor. *  Please give a list of your current medications to your Primary Care Provider. *  Please update this list whenever your medications are discontinued, doses are      changed, or new medications (including over-the-counter products) are added. *  Please carry medication information at all times in case of emergency situations. These are general instructions for a healthy lifestyle:    No smoking/ No tobacco products/ Avoid exposure to second hand smoke  Surgeon General's Warning:  Quitting smoking now greatly reduces serious risk to your health. Obesity, smoking, and sedentary lifestyle greatly increases your risk for illness    A healthy diet, regular physical exercise & weight monitoring are important for maintaining a healthy lifestyle    You may be retaining fluid if you have a history of heart failure or if you experience any of the following symptoms:  Weight gain of 3 pounds or more overnight or 5 pounds in a week, increased swelling in our hands or feet or shortness of breath while lying flat in bed. Please call your doctor as soon as you notice any of these symptoms; do not wait until your next office visit. Recognize signs and symptoms of STROKE:    F-face looks uneven    A-arms unable to move or move unevenly    S-speech slurred or non-existent    T-time-call 911 as soon as signs and symptoms begin-DO NOT go       Back to bed or wait to see if you get better-TIME IS BRAIN. Warning Signs of HEART ATTACK     Call 911 if you have these symptoms:   Chest discomfort. Most heart attacks involve discomfort in the center of the chest that lasts more than a few minutes, or that goes away and comes back.  It can feel like uncomfortable pressure, squeezing, fullness, or pain.  Discomfort in other areas of the upper body. Symptoms can include pain or discomfort in one or both arms, the back, neck, jaw, or stomach.  Shortness of breath with or without chest discomfort.  Other signs may include breaking out in a cold sweat, nausea, or lightheadedness. Don't wait more than five minutes to call 911 - MINUTES MATTER! Fast action can save your life. Calling 911 is almost always the fastest way to get lifesaving treatment. Emergency Medical Services staff can begin treatment when they arrive -- up to an hour sooner than if someone gets to the hospital by car. The discharge information has been reviewed with the patient. The patient verbalized understanding. Discharge medications reviewed with the patient and appropriate educational materials and side effects teaching were provided.   ___________________________________________________________________________________________________________________________________

## 2019-06-08 LAB
BLD PROD TYP BPU: NORMAL
BPU ID: NORMAL
STATUS OF UNIT,%ST: NORMAL
UNIT DIVISION, %UDIV: 0

## 2019-06-10 ENCOUNTER — PATIENT OUTREACH (OUTPATIENT)
Dept: CASE MANAGEMENT | Age: 74
End: 2019-06-10

## 2019-06-10 ENCOUNTER — HOSPITAL ENCOUNTER (OUTPATIENT)
Dept: LAB | Age: 74
Discharge: HOME | DRG: 314 | End: 2019-06-10
Payer: MEDICARE

## 2019-06-10 DIAGNOSIS — C92.00 ACUTE MYELOID LEUKEMIA NOT HAVING ACHIEVED REMISSION (HCC): ICD-10-CM

## 2019-06-10 LAB
ALBUMIN SERPL-MCNC: 3.8 G/DL (ref 3.2–4.6)
ALBUMIN/GLOB SERPL: 1.1 {RATIO} (ref 1.2–3.5)
ALP SERPL-CCNC: 126 U/L (ref 50–136)
ALT SERPL-CCNC: 20 U/L (ref 12–65)
ANION GAP SERPL CALC-SCNC: 9 MMOL/L (ref 7–16)
AST SERPL-CCNC: 11 U/L (ref 15–37)
BILIRUB SERPL-MCNC: 0.5 MG/DL (ref 0.2–1.1)
BUN SERPL-MCNC: 15 MG/DL (ref 8–23)
CALCIUM SERPL-MCNC: 9.2 MG/DL (ref 8.3–10.4)
CHLORIDE SERPL-SCNC: 107 MMOL/L (ref 98–107)
CO2 SERPL-SCNC: 24 MMOL/L (ref 21–32)
CREAT SERPL-MCNC: 0.83 MG/DL (ref 0.6–1)
DIFFERENTIAL METHOD BLD: ABNORMAL
ERYTHROCYTE [DISTWIDTH] IN BLOOD BY AUTOMATED COUNT: 13.6 % (ref 11.9–14.6)
GLOBULIN SER CALC-MCNC: 3.4 G/DL (ref 2.3–3.5)
GLUCOSE SERPL-MCNC: 117 MG/DL (ref 65–100)
HCT VFR BLD AUTO: 23.8 % (ref 35.8–46.3)
HGB BLD-MCNC: 8.2 G/DL (ref 11.7–15.4)
MAGNESIUM SERPL-MCNC: 2 MG/DL (ref 1.8–2.4)
MCH RBC QN AUTO: 29.9 PG (ref 26.1–32.9)
MCHC RBC AUTO-ENTMCNC: 34.5 G/DL (ref 31.4–35)
MCV RBC AUTO: 86.9 FL (ref 79.6–97.8)
NRBC # BLD: 0.01 K/UL (ref 0–0.2)
PHOSPHATE SERPL-MCNC: 2.9 MG/DL (ref 2.3–3.7)
PLATELET # BLD AUTO: 52 K/UL (ref 150–450)
PLATELET COMMENTS,PCOM: ABNORMAL
PMV BLD AUTO: 10 FL (ref 9.4–12.3)
POTASSIUM SERPL-SCNC: 3.7 MMOL/L (ref 3.5–5.1)
PROT SERPL-MCNC: 7.2 G/DL (ref 6.3–8.2)
RBC # BLD AUTO: 2.74 M/UL (ref 4.05–5.25)
RBC MORPH BLD: ABNORMAL
SODIUM SERPL-SCNC: 140 MMOL/L (ref 136–145)
WBC # BLD AUTO: 0.2 K/UL (ref 4.3–11.1)
WBC MORPH BLD: ABNORMAL

## 2019-06-10 PROCEDURE — 83735 ASSAY OF MAGNESIUM: CPT

## 2019-06-10 PROCEDURE — 80053 COMPREHEN METABOLIC PANEL: CPT

## 2019-06-10 PROCEDURE — 85025 COMPLETE CBC W/AUTO DIFF WBC: CPT

## 2019-06-10 PROCEDURE — 84100 ASSAY OF PHOSPHORUS: CPT

## 2019-06-10 NOTE — PROGRESS NOTES
This note will not be viewable in [address] Transition of Care Discharge Follow-up Questionnaire Date/Time of Call: 
 6/10/19 
 1221pm  
What was the patient hospitalized for? Admission for antineoplastic chemotherapy Hx: AML Does the patient understand his/her diagnosis and/or treatment and what happened during the hospitalization? Yes, spoke with patients , Reji Cobbr, he states understanding of diagnosis and treatment; and is agreeable to call. Jackson states patient is doing well Did the patient receive discharge instructions? Yes   
CM Assessed Risk for Readmission:  
 
 
Patient stated Risk for Readmission: Low/moderate r/t diagnosis, comorbidities and/or treatment Planned admission for chemo Review any discharge instructions (see discharge instructions/AVS in [facility]). Ask patient if they understand these. Do they have any questions? Reviewed, understanding is stated, no questions at this time Were home services ordered (nursing, PT, OT, ST, etc.)? No  
  
If so, has the first visit occurred? If not, why? (Assist with coordination of services if necessary.) 
 N/A Was any DME ordered? No   
If so, has it been received? If not, why?  (Assist patient in obtaining DME orders &/or equipment if necessary.) N/A Complete a review of all medications (new, continued and discontinued meds per the D/C instructions and medication tab in [facility]). Completed START taking: 
chlorhexidine 0.12 % solution (PERIDEX) cholecalciferol 400 unit Tab tablet (VITAMIN D3) Replaces cholecalciferol 50,000 unit capsule 
clindamycin 300 mg capsule (CLEOCIN) STOP taking: 
calcium carbonate 500 mg calcium (1,250 mg) 
tablet (OS-CHRIS) 
cholecalciferol 50,000 unit capsule (VITAMIN D3) Replaced by cholecalciferol 400 unit Tab tablet Were all new prescriptions filled?   If not, why?  (Assist patient in obtaining medications if necessary  escalate for CCM &/or SW if ongoing issues are verbalized by pt or anticipated) Yes Does the patient understand the purpose and dosing instructions for all medications? (If patient has questions, provide explanation and education.) Yes Does the patient have any problems in performing ADLs? (If patient is unable to perform ADLs  what is the limiting factor(s)? Do they have a support system that can assist? If no support system is present, discuss possible assistance that they may be able to obtain. Escalate for CCM/SW if ongoing issues are verbalized by pt or anticipated) Independent with [employer] To assists if needed Does the patient have all follow-up appointments scheduled? 7 day f/up with PCP?  
(f/up with PCP may be w/in 14 days if patient has a f/up with their specialist w/in 7 days) 7-14 day f/up with specialist?  
(or per discharge instructions) If f/up has not been made  what actions has the care coordinator made to accomplish this? Has transportation been arranged? Yes Dr. Mckinney Hearing 6/18/19 Dr. Tanvi Kuhn oncology  patient was seen today and has multiple upcoming visits Yes, there are no transportation needs at this time. Any other questions or concerns expressed by the patient? No other needs or concerns identified. Jackson states his gratitude for follow up. Contact information for [facility] was given, instructed to call with new questions or concerns. Schedule next appointment with MARIAH LEO Coordinator or refer to RN Case Manager/ per the workflow guidelines. When is care coordinators next follow-up call scheduled? If referred for CCM  what RN care manager was the referral assigned? Patient is closely followed by oncology nurse navigator Due to chronic disease process patient will likely have unavoidable hospitalizations and medical interventions for remainder of treatment, as well as patient and family knowledge of disease and treatment, no further TOBY outreach is needed. TOBY Call Completed By: Nawaf Manzanares LPN Care Coordinator

## 2019-06-10 NOTE — PROGRESS NOTES
6/10/19:  New patient visit to the office after recent lengthy hospitalization for new diagnosis of AML. Patient reports anxiety coming to office today but otherwise doing ok. She was recently discharged with home antibiotics and close monitoring until blood counts recover. NP, Clayton Anderson, reviewed labs and assessed the patient. No replacements needed today. Patient to return on Wednesday for labs and replacements. Bone marrow with sedation to be scheduled when counts recover. NP follow-up on Friday and Dr. Nereyda Loya on 6/17.

## 2019-06-12 ENCOUNTER — HOSPITAL ENCOUNTER (OUTPATIENT)
Dept: CT IMAGING | Age: 74
Discharge: HOME | End: 2019-06-12
Attending: INTERNAL MEDICINE

## 2019-06-12 ENCOUNTER — PATIENT OUTREACH (OUTPATIENT)
Dept: CASE MANAGEMENT | Age: 74
End: 2019-06-12

## 2019-06-12 ENCOUNTER — HOSPITAL ENCOUNTER (INPATIENT)
Age: 74
LOS: 3 days | Discharge: HOME | DRG: 314 | End: 2019-06-15
Attending: INTERNAL MEDICINE | Admitting: INTERNAL MEDICINE
Payer: MEDICARE

## 2019-06-12 ENCOUNTER — HOSPITAL ENCOUNTER (OUTPATIENT)
Dept: INFUSION THERAPY | Age: 74
Discharge: HOME | End: 2019-06-12
Payer: MEDICARE

## 2019-06-12 ENCOUNTER — HOSPITAL ENCOUNTER (OUTPATIENT)
Dept: LAB | Age: 74
Discharge: HOME | DRG: 314 | End: 2019-06-12
Payer: MEDICARE

## 2019-06-12 VITALS
HEART RATE: 81 BPM | RESPIRATION RATE: 18 BRPM | TEMPERATURE: 98 F | SYSTOLIC BLOOD PRESSURE: 127 MMHG | OXYGEN SATURATION: 97 % | DIASTOLIC BLOOD PRESSURE: 73 MMHG

## 2019-06-12 DIAGNOSIS — C92.00 ACUTE MYELOID LEUKEMIA NOT HAVING ACHIEVED REMISSION (HCC): ICD-10-CM

## 2019-06-12 DIAGNOSIS — D69.6 THROMBOCYTOPENIA (HCC): ICD-10-CM

## 2019-06-12 DIAGNOSIS — D61.810 PANCYTOPENIA DUE TO ANTINEOPLASTIC CHEMOTHERAPY (HCC): ICD-10-CM

## 2019-06-12 DIAGNOSIS — B99.9 IMMUNOCOMPROMISED STATUS ASSOCIATED WITH INFECTION (HCC): ICD-10-CM

## 2019-06-12 DIAGNOSIS — T80.212A PORT OR RESERVOIR INFECTION, INITIAL ENCOUNTER: ICD-10-CM

## 2019-06-12 DIAGNOSIS — T45.1X5A PANCYTOPENIA DUE TO ANTINEOPLASTIC CHEMOTHERAPY (HCC): ICD-10-CM

## 2019-06-12 DIAGNOSIS — L03.221 CELLULITIS OF NECK: ICD-10-CM

## 2019-06-12 DIAGNOSIS — D84.81 IMMUNOCOMPROMISED STATUS ASSOCIATED WITH INFECTION (HCC): ICD-10-CM

## 2019-06-12 LAB
ALBUMIN SERPL-MCNC: 3.6 G/DL (ref 3.2–4.6)
ALBUMIN/GLOB SERPL: 1.1 {RATIO} (ref 1.2–3.5)
ALP SERPL-CCNC: 115 U/L (ref 50–136)
ALT SERPL-CCNC: 18 U/L (ref 12–65)
ANION GAP SERPL CALC-SCNC: 10 MMOL/L (ref 7–16)
AST SERPL-CCNC: 11 U/L (ref 15–37)
BILIRUB SERPL-MCNC: 0.5 MG/DL (ref 0.2–1.1)
BUN SERPL-MCNC: 11 MG/DL (ref 8–23)
CALCIUM SERPL-MCNC: 8.8 MG/DL (ref 8.3–10.4)
CHLORIDE SERPL-SCNC: 107 MMOL/L (ref 98–107)
CO2 SERPL-SCNC: 23 MMOL/L (ref 21–32)
CREAT SERPL-MCNC: 0.77 MG/DL (ref 0.6–1)
DIFFERENTIAL METHOD BLD: ABNORMAL
ERYTHROCYTE [DISTWIDTH] IN BLOOD BY AUTOMATED COUNT: 13.8 % (ref 11.9–14.6)
GLOBULIN SER CALC-MCNC: 3.3 G/DL (ref 2.3–3.5)
GLUCOSE SERPL-MCNC: 122 MG/DL (ref 65–100)
HCT VFR BLD AUTO: 22.7 % (ref 35.8–46.3)
HGB BLD-MCNC: 7.5 G/DL (ref 11.7–15.4)
MAGNESIUM SERPL-MCNC: 2.1 MG/DL (ref 1.8–2.4)
MCH RBC QN AUTO: 29.1 PG (ref 26.1–32.9)
MCHC RBC AUTO-ENTMCNC: 33 G/DL (ref 31.4–35)
MCV RBC AUTO: 88 FL (ref 79.6–97.8)
NRBC # BLD: 0 K/UL (ref 0–0.2)
PHOSPHATE SERPL-MCNC: 2.2 MG/DL (ref 2.3–3.7)
PLATELET # BLD AUTO: 47 K/UL (ref 150–450)
PLATELET COMMENTS,PCOM: ABNORMAL
PMV BLD AUTO: 10 FL (ref 9.4–12.3)
POTASSIUM SERPL-SCNC: 3.6 MMOL/L (ref 3.5–5.1)
PROT SERPL-MCNC: 6.9 G/DL (ref 6.3–8.2)
RBC # BLD AUTO: 2.58 M/UL (ref 4.05–5.25)
RBC MORPH BLD: ABNORMAL
RBC MORPH BLD: ABNORMAL
SODIUM SERPL-SCNC: 140 MMOL/L (ref 136–145)
WBC # BLD AUTO: 0.2 K/UL (ref 4.3–11.1)
WBC MORPH BLD: ABNORMAL

## 2019-06-12 PROCEDURE — 84100 ASSAY OF PHOSPHORUS: CPT

## 2019-06-12 PROCEDURE — 74011250637 HC RX REV CODE- 250/637: Performed by: NURSE PRACTITIONER

## 2019-06-12 PROCEDURE — 74011250636 HC RX REV CODE- 250/636: Performed by: NURSE PRACTITIONER

## 2019-06-12 PROCEDURE — 74011000258 HC RX REV CODE- 258: Performed by: INTERNAL MEDICINE

## 2019-06-12 PROCEDURE — 96365 THER/PROPH/DIAG IV INF INIT: CPT

## 2019-06-12 PROCEDURE — 85025 COMPLETE CBC W/AUTO DIFF WBC: CPT

## 2019-06-12 PROCEDURE — 36415 COLL VENOUS BLD VENIPUNCTURE: CPT

## 2019-06-12 PROCEDURE — 83735 ASSAY OF MAGNESIUM: CPT

## 2019-06-12 PROCEDURE — 87040 BLOOD CULTURE FOR BACTERIA: CPT

## 2019-06-12 PROCEDURE — 87205 SMEAR GRAM STAIN: CPT

## 2019-06-12 PROCEDURE — 99223 1ST HOSP IP/OBS HIGH 75: CPT | Performed by: INTERNAL MEDICINE

## 2019-06-12 PROCEDURE — 74011250636 HC RX REV CODE- 250/636: Performed by: INTERNAL MEDICINE

## 2019-06-12 PROCEDURE — 74011000250 HC RX REV CODE- 250: Performed by: NURSE PRACTITIONER

## 2019-06-12 PROCEDURE — 96361 HYDRATE IV INFUSION ADD-ON: CPT

## 2019-06-12 PROCEDURE — 65270000015 HC RM PRIVATE ONCOLOGY

## 2019-06-12 PROCEDURE — 80053 COMPREHEN METABOLIC PANEL: CPT

## 2019-06-12 RX ORDER — ALLOPURINOL 300 MG/1
300 TABLET ORAL DAILY
Status: DISCONTINUED | OUTPATIENT
Start: 2019-06-13 | End: 2019-06-15 | Stop reason: HOSPADM

## 2019-06-12 RX ORDER — LORAZEPAM 1 MG/1
1 TABLET ORAL
Status: DISCONTINUED | OUTPATIENT
Start: 2019-06-12 | End: 2019-06-15 | Stop reason: HOSPADM

## 2019-06-12 RX ORDER — CEFEPIME HYDROCHLORIDE 2 G/1
2 INJECTION, POWDER, FOR SOLUTION INTRAVENOUS EVERY 8 HOURS
Status: DISCONTINUED | OUTPATIENT
Start: 2019-06-12 | End: 2019-06-12 | Stop reason: DRUGHIGH

## 2019-06-12 RX ORDER — SODIUM CHLORIDE 0.9 % (FLUSH) 0.9 %
10 SYRINGE (ML) INJECTION EVERY 8 HOURS
Status: DISCONTINUED | OUTPATIENT
Start: 2019-06-12 | End: 2019-06-16 | Stop reason: HOSPADM

## 2019-06-12 RX ORDER — PRAVASTATIN SODIUM 20 MG/1
10 TABLET ORAL
Status: DISCONTINUED | OUTPATIENT
Start: 2019-06-12 | End: 2019-06-15 | Stop reason: HOSPADM

## 2019-06-12 RX ORDER — CYANOCOBALAMIN (VITAMIN B-12) 500 MCG
800 TABLET ORAL DAILY
Status: DISCONTINUED | OUTPATIENT
Start: 2019-06-13 | End: 2019-06-15 | Stop reason: HOSPADM

## 2019-06-12 RX ORDER — VANCOMYCIN HYDROCHLORIDE
1250 ONCE
Status: COMPLETED | OUTPATIENT
Start: 2019-06-12 | End: 2019-06-12

## 2019-06-12 RX ORDER — VANCOMYCIN HYDROCHLORIDE
1250 EVERY 12 HOURS
Status: DISCONTINUED | OUTPATIENT
Start: 2019-06-12 | End: 2019-06-13 | Stop reason: SDUPTHER

## 2019-06-12 RX ORDER — ACYCLOVIR 800 MG/1
400 TABLET ORAL 2 TIMES DAILY
Status: DISCONTINUED | OUTPATIENT
Start: 2019-06-12 | End: 2019-06-15 | Stop reason: HOSPADM

## 2019-06-12 RX ORDER — ESCITALOPRAM OXALATE 10 MG/1
10 TABLET ORAL DAILY
Status: DISCONTINUED | OUTPATIENT
Start: 2019-06-13 | End: 2019-06-15 | Stop reason: HOSPADM

## 2019-06-12 RX ORDER — PROCHLORPERAZINE MALEATE 10 MG
5-10 TABLET ORAL
Status: DISCONTINUED | OUTPATIENT
Start: 2019-06-12 | End: 2019-06-15 | Stop reason: HOSPADM

## 2019-06-12 RX ORDER — ONDANSETRON 2 MG/ML
4 INJECTION INTRAMUSCULAR; INTRAVENOUS
Status: DISCONTINUED | OUTPATIENT
Start: 2019-06-12 | End: 2019-06-15 | Stop reason: HOSPADM

## 2019-06-12 RX ORDER — SODIUM CHLORIDE 9 MG/ML
1000 INJECTION, SOLUTION INTRAVENOUS ONCE
Status: COMPLETED | OUTPATIENT
Start: 2019-06-12 | End: 2019-06-12

## 2019-06-12 RX ORDER — CHLORHEXIDINE GLUCONATE 1.2 MG/ML
15 RINSE ORAL 2 TIMES DAILY
Status: DISCONTINUED | OUTPATIENT
Start: 2019-06-12 | End: 2019-06-15 | Stop reason: HOSPADM

## 2019-06-12 RX ORDER — FLUCONAZOLE 100 MG/1
200 TABLET ORAL DAILY
Status: DISCONTINUED | OUTPATIENT
Start: 2019-06-13 | End: 2019-06-15 | Stop reason: HOSPADM

## 2019-06-12 RX ADMIN — VANCOMYCIN HYDROCHLORIDE 1250 MG: 10 INJECTION, POWDER, LYOPHILIZED, FOR SOLUTION INTRAVENOUS at 21:29

## 2019-06-12 RX ADMIN — VANCOMYCIN HYDROCHLORIDE 1250 MG: 10 INJECTION, POWDER, LYOPHILIZED, FOR SOLUTION INTRAVENOUS at 11:55

## 2019-06-12 RX ADMIN — ACYCLOVIR 400 MG: 800 TABLET ORAL at 17:27

## 2019-06-12 RX ADMIN — LORAZEPAM 1 MG: 1 TABLET ORAL at 21:30

## 2019-06-12 RX ADMIN — ACETAMINOPHEN: 500 TABLET, FILM COATED ORAL at 21:29

## 2019-06-12 RX ADMIN — SODIUM CHLORIDE 1000 ML: 900 INJECTION, SOLUTION INTRAVENOUS at 10:15

## 2019-06-12 RX ADMIN — CEFEPIME HYDROCHLORIDE 2 G: 2 INJECTION, POWDER, FOR SOLUTION INTRAVENOUS at 23:32

## 2019-06-12 RX ADMIN — CHLORHEXIDINE GLUCONATE 15 ML: 1.2 RINSE ORAL at 17:27

## 2019-06-12 RX ADMIN — PRAVASTATIN SODIUM 10 MG: 20 TABLET ORAL at 21:30

## 2019-06-12 RX ADMIN — CEFEPIME HYDROCHLORIDE 2 G: 2 INJECTION, POWDER, FOR SOLUTION INTRAVENOUS at 14:42

## 2019-06-12 NOTE — PROGRESS NOTES
Problem: Falls - Risk of 
Goal: *Absence of Falls Description Document Ricci Modi Fall Risk and appropriate interventions in the flowsheet.  
Outcome: Progressing Towards Goal

## 2019-06-12 NOTE — PROGRESS NOTES
Pt is well known to the floor. Pt is being admitted for IV antibiotic treatment and port removal. 
At this time no discharge needs noted. Case management will continue to follow. Care Management Interventions PCP Verified by CM: Yes Mode of Transport at Discharge: Self Transition of Care Consult (CM Consult): Discharge Planning Current Support Network: Lives with Spouse, Own Home, Family Lives [place] Confirm Follow Up Transport: Family Plan discussed with Pt/Family/Caregiver: Yes Freedom of Choice Offered: Yes [employer] Information Provided?: No 
Discharge Location Discharge Placement: Unable to determine at this time

## 2019-06-12 NOTE — PROGRESS NOTES
6/12/19:  Saw patient in infusion with NP, Madyson Ohara. Patient c/o area around port and up neck sore. Skin tender to touch and more red than at visit on Monday morning. Dr. Michelle Carlson saw patient in infusion and plan is for admission. Acct # obtained. Patient to be admitted to 1.

## 2019-06-12 NOTE — PROGRESS NOTES
END OF SHIFT NOTE: 
 
Intake/Output 06/12 0701 - 06/12 1900 In: 120 [P.O.:120] Out: -   
Voiding: YES Catheter: NO 
Drain:   
 
 
 
 
Stool:  0 occurrences. Stool Assessment Stool Appearance: Soft(per patietn) (06/12/19 1423) Emesis:  0 occurrences. VITAL SIGNS Patient Vitals for the past 12 hrs: 
 Temp Pulse Resp BP SpO2  
06/12/19 1416 98.2 °F (36.8 °C) 100 20 120/52 98 % Pain Assessment Pain 1 Pain Scale 1: Numeric (0 - 10) (06/12/19 1423) Pain Intensity 1: 0 (06/12/19 1423) Patient Stated Pain Goal: 0 (06/12/19 1423) Ambulating Yes Additional Information:  
-Admission from [facility] today d/t possible port infection 
-IR to remove port tomorrow under sedation, NPO at MN  
-Afebrile this shift 
- at bedside Shift report given to oncoming nurse at the bedside.  
 
Natalie Florian, RN

## 2019-06-12 NOTE — PROGRESS NOTES
TRANSFER - IN REPORT: 
 
Verbal report received from Moi ramos RN on Cynthia Loomis  being received from [facility] for change in patient condition(possible port infection ) Report consisted of patients Situation, Background, Assessment and  
Recommendations(SBAR). Information from the following report(s) SBAR was reviewed with the receiving nurse. Opportunity for questions and clarification was provided. Assessment will be completed upon patients arrival to unit and care will be assumed.

## 2019-06-12 NOTE — CONSULTS
Department of Interventional Radiology 
[phone] Consult Note Patient: Kartik iKng MRN: [id number]  SSN: [id number] YOB: 1945  Age: 68 y.o. Sex: female Referring Physician: Oncology Consult Date: 6/12/2019 Subjective: Chief Complaint: cellulitis History of Present Illness: Kartik King is a 68 y.o. female who is seen in consultation for port removal which was placed 4/30/2019 for lymphoma. Pt reports that yesterday the skin overlying the port became reddened. She was treated for bacteremia during her induction. No fevers, N/V or diarrhea. WBC count 200. Past Medical History:  
Diagnosis Date  Cancer ([address] AML Past Surgical History:  
Procedure Laterality Date  IR INSERT TUNL CVC W PORT OVER 5 YEARS  4/30/2019 No family history on file. Social History Tobacco Use  Smoking status: Never Smoker  Smokeless tobacco: Never Used Substance Use Topics  Alcohol use: Never Frequency: Never No Known Allergies Prior to Admission medications Medication Sig Start Date End Date Taking? Authorizing Provider  
acyclovir (ZOVIRAX) 400 mg tablet Take 1 Tab by mouth two (2) times a day for 30 days. 6/7/19 7/7/19 Yes Burnetta Goltz, NP  
allopurinol (ZYLOPRIM) 300 mg tablet Take 1 Tab by mouth daily for 30 days. 6/7/19 7/7/19 Yes Burnetta Goltz, NP  
levoFLOXacin (LEVAQUIN) 500 mg tablet Take 1 Tab by mouth every twenty-four (24) hours for 30 days. 6/7/19 7/7/19 Yes Burnetta Goltz, NP  
cholecalciferol (VITAMIN D3) 400 unit tab tablet Take 2 Tabs by mouth daily. 6/7/19  Yes Burnetta Goltz, NP  
fluconazole (DIFLUCAN) 200 mg tablet Take 1 Tab by mouth daily for 30 days. FDA advises cautious prescribing of oral fluconazole in pregnancy. 6/7/19 7/7/19 Yes Burnetta Goltz, NP  
chlorhexidine (PERIDEX) 0.12 % solution Take 15 mL by mouth two (2) times a day.  6/7/19  Yes Burnetta Goltz, NP  
 lidocaine-prilocaine (EMLA) topical cream Apply  to affected area as needed for Pain. Apply to port site 45-60 minutes prior to lab appt or infusion 5/2/19  Yes Sindy Alvarado MD  
ondansetron hcl (ZOFRAN) 8 mg tablet Take 1 Tab by mouth every eight (8) hours as needed for Nausea. 4/30/19  Yes Brittany Cade NP  
vit C/E/zinc/lutein/zeaxanthin ([address] PO) Take 1 Tab by mouth daily. Yes Provider, Historical  
escitalopram oxalate (LEXAPRO) 10 mg tablet Take 10 mg by mouth daily. Yes Provider, Historical  
LORazepam (ATIVAN) 1 mg tablet Take  by mouth nightly. Yes Provider, Historical  
acetaminophen 500 mg tab 500 mg, diphenhydrAMINE 25 mg cap 25 mg Take  by mouth nightly. Yes Provider, Historical  
pravastatin (PRAVACHOL) 10 mg tablet Take  by mouth nightly. Yes Provider, Historical  
   
 
Review of Systems: A detailed 10 organ review of systems is obtained with pertinent positives as listed in the History of Present Illness and Past Medical History. All others are negative. Objective:  
 
Physical Exam: 
Visit Vitals /52 (BP 1 Location: Left arm, BP Patient Position: At rest;Sitting) Pulse 100 Temp 98.2 °F (36.8 °C) Resp 20 Wt 84.5 kg (186 lb 3.2 oz) SpO2 98% BMI 30.05 kg/m² Gen: NAD Right double lumen chest port site lightly reddened, tender, no bogginess or swelling. Incision is well healed. Lab/Data Review: 
BMP: No results found for: NA, K, CL, CO2, AGAP, GLU, BUN, CREA, GFRAA, GFRNA 
CBC: No results found for: WBC, HGB, HGBEXT, HCT, HCTEXT, PLT, PLTEXT, HGBEXT, HCTEXT, PLTEXT Assessment/Plan:  
Lymphoma, immunosuppressed, leukopenia, chest port cellulitis. Will remove chest port in the morning with moderate sedation. NPO after MN.   
 
Jackquelyn Snellen, PA-C

## 2019-06-12 NOTE — H&P
[address] Hematology Oncology Inpatient Hematology / Oncology History and PhysicalReason for Asmission:  AML History of Present Illness: Ms. Saurav Cleaning is a 68 y.o. female admitted on 06/12/19 with a primary diagnosis of cellulitis of port-a-cath in immunocompromised patient. She has a history of AML, FLT3+ status post 7+3 and Midostaurin. She tolerated this well overall. During her induction admission, she was found to have strep bacteremia and was treated with Rocephin until June 5. She had a blister with surrounding erythema below her port which had improved on clindamycin prior to discharge so she was sent home with this. She completed oral Clindamycin therapy yesterday. She was at the [facility] today for follow up blood cultures and labs/replacements and complained of increased area of redness and pain around her port-a-cath. The area of concern is as big as a grapefruit. We are still awaiting count recovery - she remains quite neutropenic with a total WBC count of 200. She will be admitted for IV antibiotics and she will have her port removed. She denies any nausea, vomiting, bowel issues, dyspnea, cough, fevers, chills. Review of Systems: 
Constitutional Denies fever, chills, weight loss, appetite changes, fatigue, night sweats. HEENT Denies trauma, blurry vision, hearing loss, ear pain, nosebleeds, sore throat, neck pain and ear discharge. Skin Denies lesions or rashes. + large area of redness, tenderness around port site. Lungs Denies dyspnea, cough, sputum production or hemoptysis. Cardiovascular Denies chest pain, palpitations, or lower extremity edema. Gastrointestinal Denies nausea, vomiting, changes in bowel habits, bloody or black stools, abdominal pain.  Denies dysuria, frequency or hesitancy of urination. Neuro Denies headaches, visual changes or ataxia. Denies dizziness, tingling, tremors, sensory change, speech change, focal weakness or headaches. Hematology Denies easy bruising or bleeding, denies gingival bleeding or epistaxis. Endo Denies heat/cold intolerance, denies diabetes or thyroid abnormalities. MSK Denies back pain, arthralgias, myalgias or frequent falls. Psychiatric/Behavioral Denies depression and substance abuse. The patient is not nervous/anxious. No Known Allergies Past Medical History:  
Diagnosis Date  Cancer ([facility] [address] [facility] AML Past Surgical History:  
Procedure Laterality Date  IR INSERT TUNL CVC W PORT OVER 5 YEARS  4/30/2019 No family history on file. Social History Socioeconomic History  Marital status:  Spouse name: Not on file  Number of children: Not on file  Years of education: Not on file  Highest education level: Not on file Occupational History  Not on file Social Needs  Financial resource strain: Not on file  Food insecurity:  
  Worry: Not on file Inability: Not on file  Transportation needs:  
  Medical: Not on file Non-medical: Not on file Tobacco Use  Smoking status: Never Smoker  Smokeless tobacco: Never Used Substance and Sexual Activity  Alcohol use: Never Frequency: Never  Drug use: Never  Sexual activity: Not on file Lifestyle  Physical activity:  
  Days per week: Not on file Minutes per session: Not on file  Stress: Not on file Relationships  Social connections:  
  Talks on phone: Not on file Gets together: Not on file Attends Yazdanism service: Not on file Active member of club or organization: Not on file Attends meetings of clubs or organizations: Not on file Relationship status: Not on file  Intimate partner violence:  
  Fear of current or ex partner: Not on file Emotionally abused: Not on file Physically abused: Not on file Forced sexual activity: Not on file Other Topics Concern  Not on file Social History Narrative  Not on file Current Outpatient Medications Medication Sig Dispense Refill  lidocaine-prilocaine (EMLA) topical cream Apply  to affected area as needed for Pain. 30 g 0  
 acyclovir (ZOVIRAX) 400 mg tablet Take 1 Tab by mouth two (2) times a day for 30 days. 60 Tab 0  
 allopurinol (ZYLOPRIM) 300 mg tablet Take 1 Tab by mouth daily for 30 days. 30 Tab 0  
 levoFLOXacin (LEVAQUIN) 500 mg tablet Take 1 Tab by mouth every twenty-four (24) hours for 30 days. 30 Tab 0  cholecalciferol (VITAMIN D3) 400 unit tab tablet Take 2 Tabs by mouth daily. 60 Tab 0  
 fluconazole (DIFLUCAN) 200 mg tablet Take 1 Tab by mouth daily for 30 days. FDA advises cautious prescribing of oral fluconazole in pregnancy. 30 Tab 0  chlorhexidine (PERIDEX) 0.12 % solution Take 15 mL by mouth two (2) times a day. 420 mL 0  
 clindamycin (CLEOCIN) 300 mg capsule Take 1 Cap by mouth four (4) times daily. 16 Cap 0  
 lidocaine-prilocaine (EMLA) topical cream Apply  to affected area as needed for Pain. Apply to port site 45-60 minutes prior to lab appt or infusion 30 g 0  
 ondansetron hcl (ZOFRAN) 8 mg tablet Take 1 Tab by mouth every eight (8) hours as needed for Nausea. 90 Tab 0  
 vit C/E/zinc/lutein/zeaxanthin ([facility] PO) Take 1 Tab by mouth daily.  escitalopram oxalate (LEXAPRO) 10 mg tablet Take 10 mg by mouth daily.  LORazepam (ATIVAN) 1 mg tablet Take  by mouth nightly.  acetaminophen 500 mg tab 500 mg, diphenhydrAMINE 25 mg cap 25 mg Take  by mouth nightly.  pravastatin (PRAVACHOL) 10 mg tablet Take  by mouth nightly. Facility-Administered Medications Ordered in Other Encounters Medication Dose Route Frequency Provider Last Rate Last Dose  vancomycin (VANCOCIN) 1250 mg in  ml infusion  1,250 mg IntraVENous ONCE Leny Mcdonnell MD      
 
 
OBJECTIVE: 
No data found. No data recorded. No intake/output data recorded. Physical Exam: Constitutional: Well developed, well nourished female in no acute distress, sitting comfortably in the exam chair. HEENT: Normocephalic and atraumatic. Oropharynx is clear, mucous membranes are moist.  Extraocular muscles are intact. Sclerae anicteric. Neck supple. Skin Warm and dry. No bruising and no rash noted. + erythema, size of grapefruit with tenderness and mild swelling around port-a-cath site. Mild pallor. Respiratory Lungs are clear to auscultation bilaterally without wheezes, rales or rhonchi, normal air exchange without accessory muscle use. CVS Normal rate, regular rhythm and normal S1 and S2. No murmurs, gallops, or rubs. Abdomen Soft, nontender and nondistended, normoactive bowel sounds. No palpable mass. No hepatosplenomegaly. Neuro Grossly nonfocal with no obvious sensory or motor deficits. MSK Normal range of motion in general.  No edema and no tenderness. Psych Appropriate mood and affect. Labs:   
Recent Results (from the past 24 hour(s)) CBC WITH AUTOMATED DIFF Collection Time: 06/12/19  9:48 AM  
Result Value Ref Range WBC 0.2 (LL) 4.3 - 11.1 K/uL  
 RBC 2.58 (L) 4.05 - 5.25 M/uL HGB 7.5 (L) 11.7 - 15.4 g/dL HCT 22.7 (L) 35.8 - 46.3 % MCV 88.0 79.6 - 97.8 FL  
 MCH 29.1 26.1 - 32.9 PG  
 MCHC 33.0 31.4 - 35.0 g/dL  
 RDW 13.8 11.9 - 14.6 % PLATELET 47 (L) 501 - 450 K/uL MPV 10.0 9.4 - 12.3 FL ABSOLUTE NRBC 0.00 0.0 - 0.2 K/uL  
 RBC COMMENTS SLIGHT 
ANISOCYTOSIS + POIKILOCYTOSIS 
    
 RBC COMMENTS SLIGHT 
POLYCHROMASIA 
    
 WBC COMMENTS Result Confirmed By Smear PLATELET COMMENTS MARKED    
 DF AUTOMATED METABOLIC PANEL, COMPREHENSIVE Collection Time: 06/12/19  9:48 AM  
Result Value Ref Range Sodium 140 136 - 145 mmol/L Potassium 3.6 3.5 - 5.1 mmol/L Chloride 107 98 - 107 mmol/L  
 CO2 23 21 - 32 mmol/L Anion gap 10 7 - 16 mmol/L Glucose 122 (H) 65 - 100 mg/dL  BUN 11 8 - 23 MG/DL  
 Creatinine 0.77 0.6 - 1.0 MG/DL  
 GFR est AA >60 >60 ml/min/1.73m2 GFR est non-AA >60 >60 ml/min/1.73m2 Calcium 8.8 8.3 - 10.4 MG/DL Bilirubin, total 0.5 0.2 - 1.1 MG/DL  
 ALT (SGPT) 18 12 - 65 U/L  
 AST (SGOT) 11 (L) 15 - 37 U/L Alk. phosphatase 115 50 - 136 U/L Protein, total 6.9 6.3 - 8.2 g/dL Albumin 3.6 3.2 - 4.6 g/dL Globulin 3.3 2.3 - 3.5 g/dL A-G Ratio 1.1 (L) 1.2 - 3.5 MAGNESIUM Collection Time: 06/12/19  9:48 AM  
Result Value Ref Range Magnesium 2.1 1.8 - 2.4 mg/dL PHOSPHORUS Collection Time: 06/12/19  9:48 AM  
Result Value Ref Range Phosphorus 2.2 (L) 2.3 - 3.7 MG/DL  
 
 
 
 
ASSESSMENT: 
Problem List  Date Reviewed: 6/10/2019 Codes Class Noted Pancytopenia due to antineoplastic chemotherapy [facility]) ICD-10-CM: D61.810, T45.1X5A 
ICD-9-CM: 284.11, E933.1  6/12/2019 Cellulitis of neck ICD-10-CM: P75.634 ICD-9-CM: 682.1  6/12/2019 Acute myeloid leukemia not having achieved remission ([facility] ICD-10-CM: C92.00 ICD-9-CM: 205.00  5/9/2019 Admission for antineoplastic chemotherapy ICD-10-CM: Z51.11 ICD-9-CM: V58.11  5/5/2019 AML (acute myeloblastic leukemia) ([facility] ICD-10-CM: C92.00 ICD-9-CM: 205.00  4/28/2019 Weakness generalized ICD-10-CM: R53.1 ICD-9-CM: 780.79  4/28/2019 Pancytopenia ([facility] ICD-10-CM: V61.022 ICD-9-CM: 284.19  4/28/2019 Thrombocytopenia ([address] ICD-10-CM: D69.6 ICD-9-CM: 287.5  4/27/2019 PLAN: 
Ms. Oscar Dao is a 68 y.o. female admitted on 06/12/19 with a primary diagnosis of cellulitis of port-a-cath in immunocompromised patient. She has a history of AML, FLT3+ status post 7+3 and Midostaurin. She tolerated this well overall. During her induction admission, she was found to have strep bacteremia and was treated with Rocephin until June 5.  She had a blister with surrounding erythema below her port which had improved on clindamycin prior to discharge so she was sent home with this. She completed oral Clindamycin therapy yesterday. She was at the [facility] today for follow up blood cultures and labs/replacements and complained of increased area of redness and pain around her port-a-cath. The area of concern is as big as a grapefruit. We are still awaiting count recovery - she remains quite neutropenic with a total WBC count of 200. She will be admitted for IV antibiotics and she will have her port removed. She denies any nausea, vomiting, bowel issues, dyspnea, cough, fevers, chills. Acute Myelogenous Leukemia, +FLT3 * Status post 7+3 and Midostaurin. * Awaiting count recovery. Immunocompromised * Continue Diflucan and Acyclovir prophylaxis. Cellulitis Around Port-a-Cath * Follow cultures. * Consult IR for removal. 
* Vancomycin, pharmacy to dose. SCD for DVT prophylaxis due to thrombocytopenia Alexander DREW's Lab studies and imaging studies were personally reviewed. Pertinent old records were reviewed. Mayito Doss  [address] Hematology Oncology [address] [place] [address] Office : [phone] Fax : [phone] Attending Addendum: 
I have personally performed a face to face diagnostic evaluation on this patient. I have reviewed and agree with the care plan as documented above by  Maged Almanza N.P.  My findings are as follows: Patient appears lethargic, heart rate regular without murmurs, abdomen is non-tender, bowel sounds are positive. 68 female, h/o AML, s/p recent induction chemotherapy, currently pancytopenic, now seen in infusion center w/ worsening erythema around site of recent port placement. Pt has recently completed po clindamycin course. Given low ANC status, will admit for IV abx. Draw cultures. Hold off on further using port, likely needs removal. Per nursing and pt, was less erythematous earlier in week.  BM biopsy on count recovery, scheduled for next week. Melony Perez MD 
[facility] Hematology/Oncology [id number] [address] Office : [phone] Fax : [phone]

## 2019-06-12 NOTE — PROGRESS NOTES
06/12/19 1423 Dual Skin Pressure Injury Assessment Dual Skin Pressure Injury Assessment WDL Second Care Provider (Based on [address] [place] [address]) Edi Yo RN Dual skin assessment performed with Edi Yo RN. Redness noted to R subclavian area where her double port is located, pt states the area is tender to touch. Otherwise her skin is intact, no open cuts, sores or wounds noted.

## 2019-06-12 NOTE — PROGRESS NOTES
Arrived to the [facility] ambulatory. Hydration and vancomycin completed. Patient tolerated well. Any issues or concerns during appointment: yes, port site is red and tender to touch, port needle removed and pt got vancomycin and admitted to 5th floor downw report called to Andres Pacheco RN per Dr Alejandra Rothman. Patient aware of next infusion appointment on. Pt will get hospital follow up cris.  
Discharged ambulatory with her  to go to the hospital.

## 2019-06-12 NOTE — PROGRESS NOTES
Pharmacokinetic Consult to Pharmacist 
 
Shauna Ohara is a 68 y.o. female being treated with Vancomycin and Cefepime Weight: 84.5 kg (186 lb 3.2 oz) Lab Results Component Value Date/Time BUN 11 06/12/2019 09:48 AM  
 Creatinine 0.77 06/12/2019 09:48 AM  
 WBC 0.2 (LL) 06/12/2019 09:48 AM  
  
Estimated Creatinine Clearance: 71.3 mL/min (based on SCr of 0.77 mg/dL). Jack Travis Day 1 of vancomycin. Goal trough is 15-20. Will start Vancomycin 1250mg IV q12 based on previous dosing history. Plan to check trough prior to 5th dose. Will continue to follow patient. Thank you, 
Moreno Pablo, PharmD Pharmacist 
[phone]

## 2019-06-13 ENCOUNTER — APPOINTMENT (OUTPATIENT)
Dept: INTERVENTIONAL RADIOLOGY/VASCULAR | Age: 74
DRG: 314 | End: 2019-06-13
Attending: NURSE PRACTITIONER
Payer: MEDICARE

## 2019-06-13 LAB
ALBUMIN SERPL-MCNC: 3.2 G/DL (ref 3.2–4.6)
ALBUMIN/GLOB SERPL: 1.1 {RATIO} (ref 1.2–3.5)
ALP SERPL-CCNC: 100 U/L (ref 50–136)
ALT SERPL-CCNC: 15 U/L (ref 12–65)
ANION GAP SERPL CALC-SCNC: 7 MMOL/L (ref 7–16)
AST SERPL-CCNC: 11 U/L (ref 15–37)
BASOPHILS # BLD: 0 K/UL (ref 0–0.2)
BASOPHILS NFR BLD: 0 % (ref 0–2)
BILIRUB SERPL-MCNC: 0.4 MG/DL (ref 0.2–1.1)
BUN SERPL-MCNC: 11 MG/DL (ref 8–23)
CALCIUM SERPL-MCNC: 8.5 MG/DL (ref 8.3–10.4)
CHLORIDE SERPL-SCNC: 111 MMOL/L (ref 98–107)
CO2 SERPL-SCNC: 25 MMOL/L (ref 21–32)
CREAT SERPL-MCNC: 0.61 MG/DL (ref 0.6–1)
DIFFERENTIAL METHOD BLD: ABNORMAL
EOSINOPHIL # BLD: 0 K/UL (ref 0–0.8)
EOSINOPHIL NFR BLD: 0 % (ref 0.5–7.8)
ERYTHROCYTE [DISTWIDTH] IN BLOOD BY AUTOMATED COUNT: 13.7 % (ref 11.9–14.6)
GLOBULIN SER CALC-MCNC: 3 G/DL (ref 2.3–3.5)
GLUCOSE SERPL-MCNC: 102 MG/DL (ref 65–100)
HCT VFR BLD AUTO: 20.9 % (ref 35.8–46.3)
HGB BLD-MCNC: 6.9 G/DL (ref 11.7–15.4)
IMM GRANULOCYTES # BLD AUTO: 0 K/UL (ref 0–0.5)
IMM GRANULOCYTES NFR BLD AUTO: 0 % (ref 0–5)
LYMPHOCYTES # BLD: 0.2 K/UL (ref 0.5–4.6)
LYMPHOCYTES NFR BLD: 63 % (ref 13–44)
MCH RBC QN AUTO: 29.7 PG (ref 26.1–32.9)
MCHC RBC AUTO-ENTMCNC: 33 G/DL (ref 31.4–35)
MCV RBC AUTO: 90.1 FL (ref 79.6–97.8)
MONOCYTES # BLD: 0 K/UL (ref 0.1–1.3)
MONOCYTES NFR BLD: 11 % (ref 4–12)
NEUTS SEG # BLD: 0.1 K/UL (ref 1.7–8.2)
NEUTS SEG NFR BLD: 26 % (ref 43–78)
NRBC # BLD: 0 K/UL (ref 0–0.2)
PLATELET # BLD AUTO: 35 K/UL (ref 150–450)
PMV BLD AUTO: 10.9 FL (ref 9.4–12.3)
POTASSIUM SERPL-SCNC: 3.9 MMOL/L (ref 3.5–5.1)
PROT SERPL-MCNC: 6.2 G/DL (ref 6.3–8.2)
RBC # BLD AUTO: 2.32 M/UL (ref 4.05–5.2)
SODIUM SERPL-SCNC: 143 MMOL/L (ref 136–145)
VANCOMYCIN TROUGH SERPL-MCNC: 12.1 UG/ML (ref 5–20)
WBC # BLD AUTO: 0.4 K/UL (ref 4.3–11.1)

## 2019-06-13 PROCEDURE — 74011250636 HC RX REV CODE- 250/636: Performed by: NURSE PRACTITIONER

## 2019-06-13 PROCEDURE — 74011000250 HC RX REV CODE- 250: Performed by: PHYSICIAN ASSISTANT

## 2019-06-13 PROCEDURE — 74011250636 HC RX REV CODE- 250/636

## 2019-06-13 PROCEDURE — 86900 BLOOD TYPING SEROLOGIC ABO: CPT

## 2019-06-13 PROCEDURE — 87077 CULTURE AEROBIC IDENTIFY: CPT

## 2019-06-13 PROCEDURE — 77001 FLUOROGUIDE FOR VEIN DEVICE: CPT

## 2019-06-13 PROCEDURE — 80053 COMPREHEN METABOLIC PANEL: CPT

## 2019-06-13 PROCEDURE — 74011000258 HC RX REV CODE- 258: Performed by: INTERNAL MEDICINE

## 2019-06-13 PROCEDURE — 86644 CMV ANTIBODY: CPT

## 2019-06-13 PROCEDURE — P9040 RBC LEUKOREDUCED IRRADIATED: HCPCS

## 2019-06-13 PROCEDURE — 0JPT3WZ REMOVAL OF TOTALLY IMPLANTABLE VASCULAR ACCESS DEVICE FROM TRUNK SUBCUTANEOUS TISSUE AND FASCIA, PERCUTANEOUS APPROACH: ICD-10-PCS | Performed by: RADIOLOGY

## 2019-06-13 PROCEDURE — 74011250636 HC RX REV CODE- 250/636: Performed by: PHYSICIAN ASSISTANT

## 2019-06-13 PROCEDURE — 77030039270 HC TU BLD FLTR CARD -A

## 2019-06-13 PROCEDURE — 36415 COLL VENOUS BLD VENIPUNCTURE: CPT

## 2019-06-13 PROCEDURE — 99232 SBSQ HOSP IP/OBS MODERATE 35: CPT | Performed by: INTERNAL MEDICINE

## 2019-06-13 PROCEDURE — 30233N1 TRANSFUSION OF NONAUTOLOGOUS RED BLOOD CELLS INTO PERIPHERAL VEIN, PERCUTANEOUS APPROACH: ICD-10-PCS | Performed by: INTERNAL MEDICINE

## 2019-06-13 PROCEDURE — 87070 CULTURE OTHR SPECIMN AEROBIC: CPT

## 2019-06-13 PROCEDURE — 36430 TRANSFUSION BLD/BLD COMPNT: CPT

## 2019-06-13 PROCEDURE — 30233R1 TRANSFUSION OF NONAUTOLOGOUS PLATELETS INTO PERIPHERAL VEIN, PERCUTANEOUS APPROACH: ICD-10-PCS | Performed by: INTERNAL MEDICINE

## 2019-06-13 PROCEDURE — P9037 PLATE PHERES LEUKOREDU IRRAD: HCPCS

## 2019-06-13 PROCEDURE — 87186 SC STD MICRODIL/AGAR DIL: CPT

## 2019-06-13 PROCEDURE — 74011250637 HC RX REV CODE- 250/637: Performed by: NURSE PRACTITIONER

## 2019-06-13 PROCEDURE — 74011000250 HC RX REV CODE- 250: Performed by: NURSE PRACTITIONER

## 2019-06-13 PROCEDURE — 65270000015 HC RM PRIVATE ONCOLOGY

## 2019-06-13 PROCEDURE — 74011250636 HC RX REV CODE- 250/636: Performed by: INTERNAL MEDICINE

## 2019-06-13 PROCEDURE — 80202 ASSAY OF VANCOMYCIN: CPT

## 2019-06-13 PROCEDURE — 86923 COMPATIBILITY TEST ELECTRIC: CPT

## 2019-06-13 PROCEDURE — 74011250637 HC RX REV CODE- 250/637: Performed by: INTERNAL MEDICINE

## 2019-06-13 PROCEDURE — 85025 COMPLETE CBC W/AUTO DIFF WBC: CPT

## 2019-06-13 RX ORDER — SODIUM CHLORIDE 9 MG/ML
250 INJECTION, SOLUTION INTRAVENOUS AS NEEDED
Status: DISCONTINUED | OUTPATIENT
Start: 2019-06-13 | End: 2019-06-15 | Stop reason: HOSPADM

## 2019-06-13 RX ORDER — AMOXICILLIN 250 MG
1 CAPSULE ORAL
Status: COMPLETED | OUTPATIENT
Start: 2019-06-13 | End: 2019-06-13

## 2019-06-13 RX ORDER — VANCOMYCIN/0.9 % SOD CHLORIDE 1.5G/250ML
1500 PLASTIC BAG, INJECTION (ML) INTRAVENOUS EVERY 12 HOURS
Status: DISCONTINUED | OUTPATIENT
Start: 2019-06-14 | End: 2019-06-15 | Stop reason: HOSPADM

## 2019-06-13 RX ORDER — ACETAMINOPHEN 325 MG/1
650 TABLET ORAL
Status: DISCONTINUED | OUTPATIENT
Start: 2019-06-13 | End: 2019-06-15 | Stop reason: HOSPADM

## 2019-06-13 RX ORDER — MIDAZOLAM HYDROCHLORIDE 1 MG/ML
.5-2 INJECTION, SOLUTION INTRAMUSCULAR; INTRAVENOUS
Status: DISCONTINUED | OUTPATIENT
Start: 2019-06-13 | End: 2019-06-13

## 2019-06-13 RX ORDER — SODIUM CHLORIDE 9 MG/ML
25 INJECTION, SOLUTION INTRAVENOUS CONTINUOUS
Status: DISCONTINUED | OUTPATIENT
Start: 2019-06-13 | End: 2019-06-13

## 2019-06-13 RX ORDER — FENTANYL CITRATE 50 UG/ML
25-50 INJECTION, SOLUTION INTRAMUSCULAR; INTRAVENOUS
Status: DISCONTINUED | OUTPATIENT
Start: 2019-06-13 | End: 2019-06-13

## 2019-06-13 RX ORDER — LIDOCAINE HYDROCHLORIDE 20 MG/ML
1-10 INJECTION, SOLUTION EPIDURAL; INFILTRATION; INTRACAUDAL; PERINEURAL ONCE
Status: ACTIVE | OUTPATIENT
Start: 2019-06-13 | End: 2019-06-14

## 2019-06-13 RX ORDER — DIPHENHYDRAMINE HCL 25 MG
25 CAPSULE ORAL
Status: DISCONTINUED | OUTPATIENT
Start: 2019-06-13 | End: 2019-06-15 | Stop reason: HOSPADM

## 2019-06-13 RX ADMIN — MIDAZOLAM HYDROCHLORIDE 0.5 MG: 1 INJECTION, SOLUTION INTRAMUSCULAR; INTRAVENOUS at 14:20

## 2019-06-13 RX ADMIN — FENTANYL CITRATE 25 MCG: 50 INJECTION, SOLUTION INTRAMUSCULAR; INTRAVENOUS at 14:20

## 2019-06-13 RX ADMIN — MIDAZOLAM HYDROCHLORIDE 1 MG: 1 INJECTION, SOLUTION INTRAMUSCULAR; INTRAVENOUS at 14:12

## 2019-06-13 RX ADMIN — SENNOSIDES, DOCUSATE SODIUM 1 TABLET: 50; 8.6 TABLET, FILM COATED ORAL at 16:08

## 2019-06-13 RX ADMIN — DIPHENHYDRAMINE HYDROCHLORIDE 25 MG: 25 CAPSULE ORAL at 09:07

## 2019-06-13 RX ADMIN — SODIUM CHLORIDE 25 ML/HR: 900 INJECTION, SOLUTION INTRAVENOUS at 14:03

## 2019-06-13 RX ADMIN — CEFEPIME HYDROCHLORIDE 2 G: 2 INJECTION, POWDER, FOR SOLUTION INTRAVENOUS at 15:56

## 2019-06-13 RX ADMIN — ACETAMINOPHEN 650 MG: 325 TABLET, FILM COATED ORAL at 09:07

## 2019-06-13 RX ADMIN — ACYCLOVIR 400 MG: 800 TABLET ORAL at 16:06

## 2019-06-13 RX ADMIN — ESCITALOPRAM OXALATE 10 MG: 10 TABLET ORAL at 16:05

## 2019-06-13 RX ADMIN — CHOLECALCIFEROL TAB 10 MCG (400 UNIT) 800 UNITS: 10 TAB at 16:06

## 2019-06-13 RX ADMIN — LORAZEPAM 1 MG: 1 TABLET ORAL at 21:57

## 2019-06-13 RX ADMIN — ACETAMINOPHEN 650 MG: 325 TABLET, FILM COATED ORAL at 15:56

## 2019-06-13 RX ADMIN — FENTANYL CITRATE 50 MCG: 50 INJECTION, SOLUTION INTRAMUSCULAR; INTRAVENOUS at 14:12

## 2019-06-13 RX ADMIN — ACETAMINOPHEN: 500 TABLET, FILM COATED ORAL at 21:57

## 2019-06-13 RX ADMIN — ALLOPURINOL 300 MG: 300 TABLET ORAL at 16:06

## 2019-06-13 RX ADMIN — CHLORHEXIDINE GLUCONATE 15 ML: 1.2 RINSE ORAL at 16:06

## 2019-06-13 RX ADMIN — PRAVASTATIN SODIUM 10 MG: 20 TABLET ORAL at 21:57

## 2019-06-13 RX ADMIN — CEFEPIME HYDROCHLORIDE 2 G: 2 INJECTION, POWDER, FOR SOLUTION INTRAVENOUS at 23:10

## 2019-06-13 RX ADMIN — CEFEPIME HYDROCHLORIDE 2 G: 2 INJECTION, POWDER, FOR SOLUTION INTRAVENOUS at 06:11

## 2019-06-13 RX ADMIN — VANCOMYCIN HYDROCHLORIDE 1250 MG: 10 INJECTION, POWDER, LYOPHILIZED, FOR SOLUTION INTRAVENOUS at 21:57

## 2019-06-13 RX ADMIN — FLUCONAZOLE 200 MG: 100 TABLET ORAL at 16:05

## 2019-06-13 RX ADMIN — LIDOCAINE HYDROCHLORIDE 10 ML: 10; .005 INJECTION, SOLUTION EPIDURAL; INFILTRATION; INTRACAUDAL; PERINEURAL at 14:15

## 2019-06-13 RX ADMIN — VANCOMYCIN HYDROCHLORIDE 1250 MG: 10 INJECTION, POWDER, LYOPHILIZED, FOR SOLUTION INTRAVENOUS at 10:41

## 2019-06-13 NOTE — PROGRESS NOTES
EOS: VSS, Afebrile. Port removed and catheter tip sent for culture. BC resulted with GPC in clusters from the L AC this afternoon. Day 2 of Vanc/Cef. 1 unit of platelets administered prior to port removal. 1 unit of PRBCs administered this afternoon. Where port was removed is tender, but remains c/d/i. No current complaints voiced at this time.  at bedside.

## 2019-06-13 NOTE — PROGRESS NOTES
TRANSFER - OUT REPORT: 
 
Verbal report given to Giovany Ventura RN on [employer]  being transferred to IR for routine post - op Report consisted of patients Situation, Background, Assessment and  
Recommendations(SBAR). Information from the following report(s) SBAR, Kardex, Procedure Summary and MAR was reviewed with the receiving nurse. Lines:  
Peripheral IV 06/12/19 Left Hand (Active) Site Assessment Clean, dry, & intact 6/13/2019  7:30 AM  
Phlebitis Assessment 0 6/13/2019  7:30 AM  
Infiltration Assessment 0 6/13/2019  7:30 AM  
Dressing Status Clean, dry, & intact 6/13/2019  7:30 AM  
Dressing Type Tape;Transparent 6/13/2019  7:30 AM  
Hub Color/Line Status Yellow; Flushed;Patent 6/13/2019  7:30 AM  
Alcohol Cap Used No 6/13/2019  7:30 AM  
  
 
Opportunity for questions and clarification was provided. Patient transported with: 
 Registered Nurse

## 2019-06-13 NOTE — PROGRESS NOTES
TRANSFER - OUT REPORT: 
 
Verbal report given to KAM Mcbride(name) on [facility]  being transferred to IR(unit) for ordered procedure Report consisted of patients Situation, Background, Assessment and  
Recommendations(SBAR). Information from the following report(s) Recent Results was reviewed with the receiving nurse. Opportunity for questions and clarification was provided.

## 2019-06-13 NOTE — PROGRESS NOTES
END OF SHIFT NOTE: 
 
Intake/Output 06/12 1901 - 06/13 0700 In: 350 [I.V.:350] Out: -   
Voiding: YES Catheter: NO 
Drain:   
 
 
 
 
Stool:  0 occurrences. Stool Assessment Stool Appearance: Soft(per patietn) (06/12/19 1423) Emesis:  0 occurrences. VITAL SIGNS Patient Vitals for the past 12 hrs: 
 Temp Pulse Resp BP SpO2  
06/13/19 0339 98.4 °F (36.9 °C) 72 18 124/56 96 % 06/12/19 2336 98.7 °F (37.1 °C) 85 18 135/58 96 % 06/12/19 2003 98.5 °F (36.9 °C) 81 18 150/55 97 % Pain Assessment Pain 1 Pain Scale 1: Visual (06/13/19 0302) Pain Intensity 1: 0 (06/13/19 0302) Patient Stated Pain Goal: 0 (06/13/19 0302) Pain Reassessment 1: Patient resting w/respiratory rate greater than 10 (06/13/19 0302) Ambulating Yes Additional Information: Pt has been NPO since MN. VSS/Afebrile. Hgb 6.9 this AM, 1 unit of PRBC's ordered. No other needs expressed. Shift report given to oncoming nurse at the bedside.  
 
Josue Nguyen RN

## 2019-06-13 NOTE — PROGRESS NOTES
Problem: Falls - Risk of 
Goal: *Absence of Falls Description Document Mariusz Leong Fall Risk and appropriate interventions in the flowsheet.  
Outcome: Progressing Towards Goal

## 2019-06-13 NOTE — PROGRESS NOTES
IR Nurse Pre-Procedure Checklist Part 2 Consent signed: Yes 
 
H&P complete:  Yes Antibiotics: Not applicable Airway/Mallampati Done: Yes Shaved: Not applicable Pregnancy Form:Not applicable Patient Position: Yes MD Side: Yes Biopsy Worksheet: Not applicable Specimen Medium: Not applicable

## 2019-06-13 NOTE — PROGRESS NOTES
Initial visit by  to convey care and concern and encourage patient that  services are available if desired. Mrs. Nell Guzman was off the floor and I spoke with her spouse in patient's room. No concerns were voiced during the visit. Chaplains remain available for support. Herrera Hess MDiv Board Certified [employer]

## 2019-06-13 NOTE — PROGRESS NOTES
TRANSFER - OUT REPORT: 
 
Verbal report given to KAM Kinney on [employer]  being transferred to [id number] for routine progression of care Report consisted of patients Situation, Background, Assessment and  
Recommendations(SBAR). Information from the following report(s) SBAR, Kardex, Procedure Summary and MAR was reviewed with the receiving nurse. Lines:  
Peripheral IV 06/12/19 Left Hand (Active) Site Assessment Clean, dry, & intact 6/13/2019  7:30 AM  
Phlebitis Assessment 0 6/13/2019  7:30 AM  
Infiltration Assessment 0 6/13/2019  7:30 AM  
Dressing Status Clean, dry, & intact 6/13/2019  7:30 AM  
Dressing Type Tape;Transparent 6/13/2019  7:30 AM  
Hub Color/Line Status Yellow; Flushed;Patent 6/13/2019  7:30 AM  
Alcohol Cap Used No 6/13/2019  7:30 AM  
  
 
Opportunity for questions and clarification was provided. Patient transported with: 
 [employer]

## 2019-06-13 NOTE — PROGRESS NOTES
TRANSFER - IN REPORT: 
 
Verbal report received from Danelle RN (name) on [facility]  being received from IR(unit) for routine progression of care Report consisted of patients Situation, Background, Assessment and  
Recommendations(SBAR). Information from the following report(s) SBAR and Procedure Summary was reviewed with the receiving nurse. Opportunity for questions and clarification was provided. Assessment completed upon patients arrival to unit and care assumed.

## 2019-06-13 NOTE — PROCEDURES
Department of Interventional Radiology 
[phone] Interventional Radiology Brief Procedure NotePatient: Alley Ace MRN: [id number]  SSN: [id number] YOB: 1945  Age: 68 y.o. Sex: female Date of Procedure: 6/13/2019 Pre-Procedure Diagnosis: cellulitis Post-Procedure Diagnosis: SAME Procedure(s): Venous Chest Port Removal 
Brief Description of Procedure: as above Performed By: Moses Cruz PA-C Assistants: None Anesthesia:Moderate sedation per MADELINE Robin MD 
 
Estimated Blood Loss: Less than 10ml Specimens:  port to micro Implants:  None Findings: well healed incision. approx 5 ml of a clear, oily, viscous substance drained from the pocket, likely infiltrated infusion.  (of note, pt has not received chemotherapy for nearly a month). Complications: None Recommendations: monitor for proper wound healing Follow Up: prn Signed By: Moses Cruz PA-C June 13, 2019

## 2019-06-13 NOTE — PROGRESS NOTES
[address] Hematology Oncology Inpatient Hematology / Oncology Daily Progress Note Reason for Admission:  Port or reservoir infection [employer] 24 Hour Events: 
Afebrile, VSS 
IR removing port today BCx-[id number]  at bedside ROS: 
Constitutional: Negative for fever, chills, weakness, malaise, fatigue. CV: Negative for chest pain, palpitations, edema. Respiratory: Negative for dyspnea, cough, wheezing. GI: Negative for nausea, abdominal pain, diarrhea. 10 point review of systems is otherwise negative with the exception of the elements mentioned above in the HPI. No Known Allergies Past Medical History:  
Diagnosis Date  Cancer ([facility] [address] AML Past Surgical History:  
Procedure Laterality Date  IR INSERT TUNL CVC W PORT OVER 5 YEARS  4/30/2019 No family history on file. Social History Socioeconomic History  Marital status:  Spouse name: Not on file  Number of children: Not on file  Years of education: Not on file  Highest education level: Not on file Occupational History  Not on file Social Needs  Financial resource strain: Not on file  Food insecurity:  
  Worry: Not on file Inability: Not on file  Transportation needs:  
  Medical: Not on file Non-medical: Not on file Tobacco Use  Smoking status: Never Smoker  Smokeless tobacco: Never Used Substance and Sexual Activity  Alcohol use: Never Frequency: Never  Drug use: Never  Sexual activity: Not on file Lifestyle  Physical activity:  
  Days per week: Not on file Minutes per session: Not on file  Stress: Not on file Relationships  Social connections:  
  Talks on phone: Not on file Gets together: Not on file Attends [employer] service: Not on file Active member of club or organization: Not on file Attends meetings of clubs or organizations: Not on file Relationship status: Not on file  Intimate partner violence:  
  Fear of current or ex partner: Not on file Emotionally abused: Not on file Physically abused: Not on file Forced sexual activity: Not on file Other Topics Concern  Not on file Social History Narrative  Not on file Current Facility-Administered Medications Medication Dose Route Frequency Provider Last Rate Last Dose  
 0.9% sodium chloride infusion 250 mL  250 mL IntraVENous PRN Shagufta Boothe MD      
 0.9% sodium chloride infusion 250 mL  250 mL IntraVENous PRN Shagufta Boothe MD      
 diphenhydrAMINE (BENADRYL) capsule 25 mg  25 mg Oral Q6H PRN Shagufta Boothe MD   25 mg at 06/13/19 3329  acetaminophen (TYLENOL) tablet 650 mg  650 mg Oral Q6H PRN Shagufta Boothe MD   650 mg at 06/13/19 7470  acyclovir (ZOVIRAX) tablet 400 mg  400 mg Oral BID Rosie Montoya NP   400 mg at 06/12/19 1727  
 allopurinol (ZYLOPRIM) tablet 300 mg  300 mg Oral DAILY Rosie Montoya NP      
 acetaminophen/diphenhydrAMINE (TYLENOL PM EXT STR) 500/25 mg   Oral QHS Rosie Montoya NP      
 chlorhexidine (PERIDEX) 0.12 % mouthwash 15 mL  15 mL Oral BID Rosie Montoya NP   15 mL at 06/12/19 1727  cholecalciferol (VITAMIN D3) tablet 800 Units  800 Units Oral DAILY Rosie Montoya NP      
 escitalopram oxalate (LEXAPRO) tablet 10 mg  10 mg Oral DAILY Rosie Montoya NP      
 fluconazole (DIFLUCAN) tablet 200 mg  200 mg Oral DAILY Rosie Montoya NP      
 LORazepam (ATIVAN) tablet 1 mg  1 mg Oral QHS Rosie Montoya NP   1 mg at 06/12/19 2130  pravastatin (PRAVACHOL) tablet 10 mg  10 mg Oral QHS Rosie Montoya NP   10 mg at 06/12/19 2130  
 ondansetron (ZOFRAN) injection 4 mg  4 mg IntraVENous Q6H PRN Rosie Montoya NP      
 prochlorperazine (COMPAZINE) tablet 5-10 mg  5-10 mg Oral Q6H PRN Monik Montoya NP      
 vancomycin (VANCOCIN) 1250 mg in  ml infusion  1,250 mg IntraVENous Q12H Rosie Montoya, .7 mL/hr at 06/12/19 2129 1,250 mg at 06/12/19 2129  cefepime (MAXIPIME) 2 g in 0.9% sodium chloride (MBP/ADV) 100 mL  2 g IntraVENous Q8H Jitendra Sheets  mL/hr at 19 0611 2 g at 19 3058 Facility-Administered Medications Ordered in Other Encounters Medication Dose Route Frequency Provider Last Rate Last Dose  central line flush (saline) syringe 10 mL  10 mL InterCATHeter Q8H Carmen Whitmore, ARMANDO      
 
 
OBJECTIVE: 
Patient Vitals for the past 8 hrs: 
 BP Temp Pulse Resp SpO2  
19 0740 132/59 98.8 °F (37.1 °C) 79 18 96 % 19 0339 124/56 98.4 °F (36.9 °C) 72 18 96 % Temp (24hrs), Av.4 °F (36.9 °C), Min:98 °F (36.7 °C), Max:98.8 °F (37.1 °C) No intake/output data recorded. Physical Exam: 
Constitutional: Well developed, well nourished female in no acute distress, sitting comfortably in the hospital bed. HEENT: Normocephalic and atraumatic. Oropharynx is clear, mucous membranes are moist.  Extraocular muscles are intact. Sclerae anicteric. Neck supple. Skin Warm and dry. No bruising and no rash noted. + erythema, size of golf ball with tenderness and mild edema around port-a-cath site. Mild pallor. Respiratory Lungs are clear to auscultation bilaterally without wheezes, rales or rhonchi, normal air exchange without accessory muscle use. CVS Normal rate, regular rhythm and normal S1 and S2. No murmurs, gallops, or rubs. Abdomen Soft, nontender and nondistended, normoactive bowel sounds. No palpable mass. No hepatosplenomegaly. Neuro Grossly nonfocal with no obvious sensory or motor deficits. MSK Normal range of motion in general.  No edema and no tenderness. Psych Appropriate mood and affect. Labs:   
Recent Results (from the past 24 hour(s)) CBC WITH AUTOMATED DIFF Collection Time: 19  9:48 AM  
Result Value Ref Range WBC 0.2 (LL) 4.3 - 11.1 K/uL  
 RBC 2.58 (L) 4.05 - 5.25 M/uL HGB 7.5 (L) 11.7 - 15.4 g/dL HCT 22.7 (L) 35.8 - 46.3 %  MCV 88.0 79.6 - 97.8 FL  
 MCH 29.1 26.1 - 32.9 PG  
 MCHC 33.0 31.4 - 35.0 g/dL  
 RDW 13.8 11.9 - 14.6 % PLATELET 47 (L) 476 - 450 K/uL MPV 10.0 9.4 - 12.3 FL ABSOLUTE NRBC 0.00 0.0 - 0.2 K/uL  
 RBC COMMENTS SLIGHT 
ANISOCYTOSIS + POIKILOCYTOSIS 
    
 RBC COMMENTS SLIGHT 
POLYCHROMASIA 
    
 WBC COMMENTS Result Confirmed By Smear PLATELET COMMENTS MARKED    
 DF AUTOMATED METABOLIC PANEL, COMPREHENSIVE Collection Time: 06/12/19  9:48 AM  
Result Value Ref Range Sodium 140 136 - 145 mmol/L Potassium 3.6 3.5 - 5.1 mmol/L Chloride 107 98 - 107 mmol/L  
 CO2 23 21 - 32 mmol/L Anion gap 10 7 - 16 mmol/L Glucose 122 (H) 65 - 100 mg/dL BUN 11 8 - 23 MG/DL Creatinine 0.77 0.6 - 1.0 MG/DL  
 GFR est AA >60 >60 ml/min/1.73m2 GFR est non-AA >60 >60 ml/min/1.73m2 Calcium 8.8 8.3 - 10.4 MG/DL Bilirubin, total 0.5 0.2 - 1.1 MG/DL  
 ALT (SGPT) 18 12 - 65 U/L  
 AST (SGOT) 11 (L) 15 - 37 U/L Alk. phosphatase 115 50 - 136 U/L Protein, total 6.9 6.3 - 8.2 g/dL Albumin 3.6 3.2 - 4.6 g/dL Globulin 3.3 2.3 - 3.5 g/dL A-G Ratio 1.1 (L) 1.2 - 3.5 MAGNESIUM Collection Time: 06/12/19  9:48 AM  
Result Value Ref Range Magnesium 2.1 1.8 - 2.4 mg/dL PHOSPHORUS Collection Time: 06/12/19  9:48 AM  
Result Value Ref Range Phosphorus 2.2 (L) 2.3 - 3.7 MG/DL  
CULTURE, BLOOD Collection Time: 06/12/19  9:48 AM  
Result Value Ref Range Special Requests: PORT Culture result: NO GROWTH AFTER 16 HOURS    
CULTURE, BLOOD Collection Time: 06/12/19  9:54 AM  
Result Value Ref Range Special Requests: LEFT ANTECUBITAL Culture result: NO GROWTH AFTER 16 HOURS METABOLIC PANEL, COMPREHENSIVE Collection Time: 06/13/19 12:12 AM  
Result Value Ref Range Sodium 143 136 - 145 mmol/L Potassium 3.9 3.5 - 5.1 mmol/L Chloride 111 (H) 98 - 107 mmol/L  
 CO2 25 21 - 32 mmol/L Anion gap 7 7 - 16 mmol/L Glucose 102 (H) 65 - 100 mg/dL  BUN 11 8 - 23 MG/DL  
 Creatinine 0.61 0.6 - 1.0 MG/DL  
 GFR est AA >60 >60 ml/min/1.73m2 GFR est non-AA >60 >60 ml/min/1.73m2 Calcium 8.5 8.3 - 10.4 MG/DL Bilirubin, total 0.4 0.2 - 1.1 MG/DL  
 ALT (SGPT) 15 12 - 65 U/L  
 AST (SGOT) 11 (L) 15 - 37 U/L Alk. phosphatase 100 50 - 136 U/L Protein, total 6.2 (L) 6.3 - 8.2 g/dL Albumin 3.2 3.2 - 4.6 g/dL Globulin 3.0 2.3 - 3.5 g/dL A-G Ratio 1.1 (L) 1.2 - 3.5    
CBC WITH AUTOMATED DIFF Collection Time: 06/13/19 12:12 AM  
Result Value Ref Range WBC 0.4 (LL) 4.3 - 11.1 K/uL  
 RBC 2.32 (L) 4.05 - 5.2 M/uL HGB 6.9 (LL) 11.7 - 15.4 g/dL HCT 20.9 (LL) 35.8 - 46.3 % MCV 90.1 79.6 - 97.8 FL  
 MCH 29.7 26.1 - 32.9 PG  
 MCHC 33.0 31.4 - 35.0 g/dL  
 RDW 13.7 11.9 - 14.6 % PLATELET 35 (L) 070 - 450 K/uL MPV 10.9 9.4 - 12.3 FL ABSOLUTE NRBC 0.00 0.0 - 0.2 K/uL  
 DF AUTOMATED NEUTROPHILS 26 (L) 43 - 78 % LYMPHOCYTES 63 (H) 13 - 44 % MONOCYTES 11 4.0 - 12.0 % EOSINOPHILS 0 (L) 0.5 - 7.8 % BASOPHILS 0 0.0 - 2.0 % IMMATURE GRANULOCYTES 0 0.0 - 5.0 %  
 ABS. NEUTROPHILS 0.1 (L) 1.7 - 8.2 K/UL  
 ABS. LYMPHOCYTES 0.2 (L) 0.5 - 4.6 K/UL  
 ABS. MONOCYTES 0.0 (L) 0.1 - 1.3 K/UL  
 ABS. EOSINOPHILS 0.0 0.0 - 0.8 K/UL  
 ABS. BASOPHILS 0.0 0.0 - 0.2 K/UL  
 ABS. IMM. GRANS. 0.0 0.0 - 0.5 K/UL PLATELETS, ALLOCATE Collection Time: 06/13/19  7:30 AM  
Result Value Ref Range Unit number [id number] Blood component type PLTPH, LR,IR Unit division 00 Status of unit ISSUED   
 
 
 
 
ASSESSMENT: 
Problem List  Date Reviewed: 6/10/2019 Codes Class Noted Pancytopenia due to antineoplastic chemotherapy [facility]) ICD-10-CM: D61.810, T45.1X5A 
ICD-9-CM: 284.11, E933.1  6/12/2019 Cellulitis of neck ICD-10-CM: T63.273 ICD-9-CM: 682.1  6/12/2019 Immunocompromised status associated with infection ([facility] [address] ICD-10-CM: D84.9, B99.9 ICD-9-CM: 279.3, 136.9  6/12/2019 Port or reservoir infection ICD-10-CM: I17.651K ICD-9-CM: 999.33  6/12/2019 Acute myeloid leukemia not having achieved remission ([facility] ICD-10-CM: C92.00 ICD-9-CM: 205.00  5/9/2019 Admission for antineoplastic chemotherapy ICD-10-CM: Z51.11 ICD-9-CM: V58.11  5/5/2019 AML (acute myeloblastic leukemia) ([facility] ICD-10-CM: C92.00 ICD-9-CM: 205.00  4/28/2019 Weakness generalized ICD-10-CM: R53.1 ICD-9-CM: 780.79  4/28/2019 Pancytopenia ([facility] ICD-10-CM: F31.091 ICD-9-CM: 284.19  4/28/2019 Thrombocytopenia ([facility] ICD-10-CM: D69.6 ICD-9-CM: 287.5  4/27/2019 Ms. Masood Stoddard is a 68 y.o. female admitted on 06/12/19 with a primary diagnosis of cellulitis of port-a-cath in immunocompromised patient. She has a history of AML, FLT3+ status post 7+3 and Midostaurin. She tolerated this well overall. During her induction admission, she was found to have strep bacteremia and was treated with Rocephin until June 5. She had a blister with surrounding erythema below her port which had improved on clindamycin prior to discharge so she was sent home with this. She completed oral Clindamycin therapy yesterday. She was at the [facility] today for follow up blood cultures and labs/replacements and complained of increased area of redness and pain around her port-a-cath. The area of concern is as big as a grapefruit. We are still awaiting count recovery - she remains quite neutropenic with a total WBC count of 200. She will be admitted for IV antibiotics and she will have her port removed. She denies any nausea, vomiting, bowel issues, dyspnea, cough, fevers, chills. PLAN: 
Acute Myelogenous Leukemia, +FLT3 * Status post 7+3 and Midostaurin. * Awaiting count recovery * Plans for OP BMbx on 6/19 6/13 Day 36 s/p 7+3. Completed Midostaurin on 5/30 Immunocompromised * Continue Diflucan and Acyclovir prophylaxis. Cellulitis Around Port-a-Cath * Follow cultures. * Consult IR for removal. 
* Vancomycin, pharmacy to dose. 6/13 IR removing port today. BCx-NGTD. On Cef/Vanc. Pancytopenia secondary to chemotherapy - Transfuse prn per [facility] SOPs 6/13 Transfuse 1 unit PRBCs. Also receiving plt prior to procedure. Continue home meds Prophylactic Antibx: Acyclovir, Diflucan [facility] SOPs SCDs for DVT prophylaxis due to thrombocytopenia Janeen Monsivais  [address] Hematology Oncology [address] [place] [address] Office : [phone] Fax : [phone] I personally saw, exammed and counselled the patient, and discussed with NP, agree with above history/assessment/plan. 68 y. o.female AML FLT3+ s/p 7+3/midostaurin day 36, prior strep sepsis responded to rocephin, admitted for port infection, start vanc/cefepime and remove port and culture, plt transfusion for procedure. Lexi Hdz M.D. [address] Office : [phone] Fax : [phone]

## 2019-06-14 LAB
ABO + RH BLD: NORMAL
ALBUMIN SERPL-MCNC: 3.4 G/DL (ref 3.2–4.6)
ALBUMIN/GLOB SERPL: 1.1 {RATIO} (ref 1.2–3.5)
ALP SERPL-CCNC: 100 U/L (ref 50–136)
ALT SERPL-CCNC: 17 U/L (ref 12–65)
ANION GAP SERPL CALC-SCNC: 8 MMOL/L (ref 7–16)
AST SERPL-CCNC: 17 U/L (ref 15–37)
BACTERIA SPEC CULT: ABNORMAL
BACTERIA SPEC CULT: ABNORMAL
BASOPHILS # BLD: 0 K/UL (ref 0–0.2)
BASOPHILS NFR BLD: 0 % (ref 0–2)
BILIRUB SERPL-MCNC: 0.4 MG/DL (ref 0.2–1.1)
BLD PROD TYP BPU: NORMAL
BLD PROD TYP BPU: NORMAL
BLOOD GROUP ANTIBODIES SERPL: NORMAL
BPU ID: NORMAL
BPU ID: NORMAL
BUN SERPL-MCNC: 7 MG/DL (ref 8–23)
CALCIUM SERPL-MCNC: 8.5 MG/DL (ref 8.3–10.4)
CHLORIDE SERPL-SCNC: 109 MMOL/L (ref 98–107)
CO2 SERPL-SCNC: 26 MMOL/L (ref 21–32)
CREAT SERPL-MCNC: 0.63 MG/DL (ref 0.6–1)
CROSSMATCH RESULT,%XM: NORMAL
DIFFERENTIAL METHOD BLD: ABNORMAL
EOSINOPHIL # BLD: 0 K/UL (ref 0–0.8)
EOSINOPHIL NFR BLD: 3 % (ref 0.5–7.8)
ERYTHROCYTE [DISTWIDTH] IN BLOOD BY AUTOMATED COUNT: 13.5 % (ref 11.9–14.6)
GLOBULIN SER CALC-MCNC: 3.2 G/DL (ref 2.3–3.5)
GLUCOSE SERPL-MCNC: 91 MG/DL (ref 65–100)
GRAM STN SPEC: ABNORMAL
HCT VFR BLD AUTO: 24.4 % (ref 35.8–46.3)
HGB BLD-MCNC: 8.2 G/DL (ref 11.7–15.4)
IMM GRANULOCYTES # BLD AUTO: 0 K/UL (ref 0–0.5)
IMM GRANULOCYTES NFR BLD AUTO: 0 % (ref 0–5)
LYMPHOCYTES # BLD: 0.2 K/UL (ref 0.5–4.6)
LYMPHOCYTES NFR BLD: 64 % (ref 13–44)
MCH RBC QN AUTO: 30.1 PG (ref 26.1–32.9)
MCHC RBC AUTO-ENTMCNC: 33.6 G/DL (ref 31.4–35)
MCV RBC AUTO: 89.7 FL (ref 79.6–97.8)
MONOCYTES # BLD: 0 K/UL (ref 0.1–1.3)
MONOCYTES NFR BLD: 11 % (ref 4–12)
NEUTS SEG # BLD: 0.1 K/UL (ref 1.7–8.2)
NEUTS SEG NFR BLD: 22 % (ref 43–78)
NRBC # BLD: 0 K/UL (ref 0–0.2)
PLATELET # BLD AUTO: 69 K/UL (ref 150–450)
PMV BLD AUTO: 10.3 FL (ref 9.4–12.3)
POTASSIUM SERPL-SCNC: 3.7 MMOL/L (ref 3.5–5.1)
PROT SERPL-MCNC: 6.6 G/DL (ref 6.3–8.2)
RBC # BLD AUTO: 2.72 M/UL (ref 4.05–5.2)
SERVICE CMNT-IMP: ABNORMAL
SODIUM SERPL-SCNC: 143 MMOL/L (ref 136–145)
SPECIMEN EXP DATE BLD: NORMAL
STATUS OF UNIT,%ST: NORMAL
STATUS OF UNIT,%ST: NORMAL
UNIT DIVISION, %UDIV: 0
UNIT DIVISION, %UDIV: 0
WBC # BLD AUTO: 0.4 K/UL (ref 4.3–11.1)

## 2019-06-14 PROCEDURE — 74011000258 HC RX REV CODE- 258: Performed by: INTERNAL MEDICINE

## 2019-06-14 PROCEDURE — 36415 COLL VENOUS BLD VENIPUNCTURE: CPT

## 2019-06-14 PROCEDURE — 65270000015 HC RM PRIVATE ONCOLOGY

## 2019-06-14 PROCEDURE — 80053 COMPREHEN METABOLIC PANEL: CPT

## 2019-06-14 PROCEDURE — 74011000250 HC RX REV CODE- 250: Performed by: NURSE PRACTITIONER

## 2019-06-14 PROCEDURE — 85025 COMPLETE CBC W/AUTO DIFF WBC: CPT

## 2019-06-14 PROCEDURE — 87040 BLOOD CULTURE FOR BACTERIA: CPT

## 2019-06-14 PROCEDURE — 99232 SBSQ HOSP IP/OBS MODERATE 35: CPT | Performed by: INTERNAL MEDICINE

## 2019-06-14 PROCEDURE — 74011250637 HC RX REV CODE- 250/637: Performed by: NURSE PRACTITIONER

## 2019-06-14 PROCEDURE — 74011250636 HC RX REV CODE- 250/636: Performed by: INTERNAL MEDICINE

## 2019-06-14 RX ADMIN — PRAVASTATIN SODIUM 10 MG: 20 TABLET ORAL at 21:22

## 2019-06-14 RX ADMIN — FLUCONAZOLE 200 MG: 100 TABLET ORAL at 08:21

## 2019-06-14 RX ADMIN — CHOLECALCIFEROL TAB 10 MCG (400 UNIT) 800 UNITS: 10 TAB at 08:21

## 2019-06-14 RX ADMIN — CHLORHEXIDINE GLUCONATE 15 ML: 1.2 RINSE ORAL at 17:04

## 2019-06-14 RX ADMIN — ACYCLOVIR 400 MG: 800 TABLET ORAL at 17:04

## 2019-06-14 RX ADMIN — CEFEPIME HYDROCHLORIDE 2 G: 2 INJECTION, POWDER, FOR SOLUTION INTRAVENOUS at 06:09

## 2019-06-14 RX ADMIN — VANCOMYCIN HYDROCHLORIDE 1500 MG: 10 INJECTION, POWDER, LYOPHILIZED, FOR SOLUTION INTRAVENOUS at 21:22

## 2019-06-14 RX ADMIN — VANCOMYCIN HYDROCHLORIDE 1500 MG: 10 INJECTION, POWDER, LYOPHILIZED, FOR SOLUTION INTRAVENOUS at 10:18

## 2019-06-14 RX ADMIN — CEFEPIME HYDROCHLORIDE 2 G: 2 INJECTION, POWDER, FOR SOLUTION INTRAVENOUS at 23:09

## 2019-06-14 RX ADMIN — ESCITALOPRAM OXALATE 10 MG: 10 TABLET ORAL at 08:21

## 2019-06-14 RX ADMIN — ACYCLOVIR 400 MG: 800 TABLET ORAL at 08:21

## 2019-06-14 RX ADMIN — ALLOPURINOL 300 MG: 300 TABLET ORAL at 08:21

## 2019-06-14 RX ADMIN — CHLORHEXIDINE GLUCONATE 15 ML: 1.2 RINSE ORAL at 08:23

## 2019-06-14 RX ADMIN — CEFEPIME HYDROCHLORIDE 2 G: 2 INJECTION, POWDER, FOR SOLUTION INTRAVENOUS at 14:50

## 2019-06-14 RX ADMIN — LORAZEPAM 1 MG: 1 TABLET ORAL at 21:22

## 2019-06-14 RX ADMIN — ACETAMINOPHEN: 500 TABLET, FILM COATED ORAL at 21:22

## 2019-06-14 NOTE — PROGRESS NOTES
Pharmacokinetic Consult to Pharmacist 
 
Caterina  is a 68 y.o. female being treated for port infection with vancomycin and cefepime. Height: 5' 6\" (167.6 cm)  Weight: 85.3 kg (188 lb) Lab Results Component Value Date/Time BUN 7 (L) 06/14/2019 03:08 AM  
 Creatinine 0.63 06/14/2019 03:08 AM  
 WBC 0.4 (LL) 06/14/2019 03:08 AM  
  
Estimated Creatinine Clearance: 87.5 mL/min (based on SCr of 0.63 mg/dL). Lab Results Component Value Date/Time Vancomycin,trough 12.1 06/13/2019 09:05 PM  
 
 
Day 3 of vancomycin. Goal trough is 15-20. Dose adjusted to 1.5g q12h. Will continue to follow patient. Thank you, Kinza Manning, Pharm. D. Clinical Pharmacist 
[phone]

## 2019-06-14 NOTE — PROGRESS NOTES
END OF SHIFT NOTE: 
 
Intake/Output 06/13 1901 - 06/14 0700 In: 2888 [I.V.:969] [facility]: 1900 [facility] Voiding: YES Catheter: NO 
Drain:   
 
 
 
 
Stool:  0 occurrences. Stool Assessment Stool Appearance: Formed (06/13/19 0730) Emesis:  0 occurrences. VITAL SIGNS Patient Vitals for the past 12 hrs: 
 Temp Pulse Resp BP SpO2  
06/14/19 0307 98.2 °F (36.8 °C) 61 18 128/57 96 % 06/13/19 2346 98 °F (36.7 °C) 70 18 137/62 95 % 06/13/19 1933 98.5 °F (36.9 °C) 63 18 140/61 98 % Pain Assessment Pain 1 Pain Scale 1: Visual (06/14/19 0142) Pain Intensity 1: 0 (06/14/19 0142) Patient Stated Pain Goal: 0 (06/14/19 0142) Pain Reassessment 1: Patient resting w/respiratory rate greater than 10 (06/14/19 0142) Pain Location 1: Chest (06/13/19 1420) Pain Orientation 1: Right;Upper (06/13/19 1420) Pain Intervention(s) 1: Medication (see MAR) (06/13/19 1420) Ambulating Yes Additional Information: VSS/Afebrile Shift report given to oncoming nurse at the bedside.  
 
Haylee Meade, RN

## 2019-06-14 NOTE — INTERDISCIPLINARY ROUNDS
Interdisciplinary rounds with charge nurse, provider,  and . Presenting Problem: Infected port Daily Goal : IV abx, redraw blood cultures Expected Discharge Date: 6/17/19 Expected Discharge Needs: none Patient/Family Concerns addressed

## 2019-06-14 NOTE — DISCHARGE SUMMARY
[address] [place] [address] Hematology & Oncology: Inpatient Hematology / Oncology Discharge Summary NotePatient ID: Melany Cooks [id number] 
37 y.o. 
1945 Admit Date: 6/12/2019 Discharge Date: 6/15/19 Admission Diagnoses: Port or reservoir infection [employer] Discharge Diagnoses: 
Principal Diagnosis: <principal problem not specified> Active Problems: 
  Acute myeloid leukemia not having achieved remission ([facility] (5/9/2019) Pancytopenia due to antineoplastic chemotherapy ([facility] (6/12/2019) Cellulitis of neck (6/12/2019) Immunocompromised status associated with infection ([facility] (6/12/2019) Port or reservoir infection (6/12/2019) Hospital Course: Ms. Tessa Sanches is a 68 y.o. female admitted on 06/12/19 with a primary diagnosis of cellulitis of port-a-cath in immunocompromised patient.  
  
She has a history of AML, FLT3+ status post 7+3 and Midostaurin. She tolerated this well overall. During her induction admission, she was found to have strep bacteremia and was treated with Rocephin until June 5. She had a blister with surrounding erythema below her port which had improved on clindamycin prior to discharge so she was sent home with this. She completed oral Clindamycin therapy the day prior to admission. She was at the [facility] for follow up blood cultures and labs/replacements and complained of increased area of redness and pain around her port-a-cath. The area of concern was as big as a grapefruit. She is still awaiting count recovery. She was admitted for IV antibiotics and port removal. Port removed on 6/13. She did have peripheral BCx coag neg staph (1 of 2 cultures). Port tip also + coag neg staph. Repeat BCx NGTD. Her infected port was treated with Cef/Vanc and she will continue clindamycin and levaquin at home upon discharge. She is scheduled to f/u with Dr. Jess Angeles on 6/17. She also has an appt for OP BMbx on 6/19.   Advised to call with fever, chills, uncontrollable symptoms, or with any other concerns. Pt verbalizes understanding. Consults: 
IP CONSULT TO INTERVENTIONAL RADIOLOGY Pertinent Diagnostic Studies:  
Labs:   
Recent Labs  
  06/15/19 
0036 06/14/19 
0308 06/13/19 
0012 WBC 0.4* 0.4* 0.4* HGB 7.9* 8.2* 6.9*  
PLT 57* 69* 35* ANEU  --  0.1* 0.1* Recent Labs  
  06/15/19 
0036 06/14/19 
0308 06/13/19 
0012  143 143  
K 3.3* 3.7 3.9 * 109* 111* CO2 26 26 25 * 91 102* BUN 8 7* 11  
CREA 0.52* 0.63 0.61  
CA 8.3 8.5 8.5 SGOT 9* 17 11* AP 97 100 100  
TP 6.2* 6.6 6.2* ALB 3.2 3.4 3.2 Imaging: 
IR REMOVE TUNL CVAD W PORT/PUMP [[id number]] Collected: 06/13/19 1558 Order Status: Completed Updated: 06/13/19 1624 Narrative:    
Title: Chest Port Removal. 
 
History: Pleasant 70-year-old female with AML, neutropenia, cellulitis 
associated with dual-chamber chest port.  Chest port removal requested. : Hilary Montiel PA-C Supervising Physician: Kimmy Yadav M.D. Consent: Informed written and oral consent  was obtained from the patient after 
explanation of benefits and risks of procedure. Procedure: Maximal sterile barrier technique employed utilizing hat, facemask, 
hand hygiene, cutaneous antisepsis, sterile gown, sterile gloves large sterile 
drape.  Following routine prep and drape of the right chest, a local field block 
with 1% lidocaine was achieved. Using both sharp and blunt dissection, the chest 
port was identified and removed in its entirety. The pocket was approximated 
using absorbable suture. Sterile surgical glue was placed on the skin. Complications:  None. Radiation dose:  
Fluoroscopy time: 6 seconds. Reference air kerma (mGy): 0 Kerma area product (cGy.cm2):  13.17 Fluoroscopic images: 2 Contrast:  0 milliliters. Medications: Under the direction supervision of Kimmy Yadav M.D., 24 minutes of moderate sedation was provided using Versed 1.5 mg and fentanyl 75 mcg. Continuous pulse oximetry, CO2, heart rate and blood pressure were monitored by 
a trained independent observer present. Findings:  Well-healed chest port incision. Upon accessing the port pocket, 
approximately 5 mL of a viscous, clear, oily substance was encountered. This 
appears to be an infiltrated infusion. Of note, the patient's last chemotherapy 
infusion was nearly 1 month ago. Impression:    
Impression:  Uncomplicated chest port removal. 
 
Plan: Patient will be returned her Hospital room following recovery. Monitor 
site for appropriate wound healing. Current Discharge Medication List  
  
START taking these medications Details  
clindamycin (CLEOCIN) 300 mg capsule Take 2 Caps by mouth three (3) times daily for 10 days. Qty: 60 Cap, Refills: 0 CONTINUE these medications which have NOT CHANGED Details  
acyclovir (ZOVIRAX) 400 mg tablet Take 1 Tab by mouth two (2) times a day for 30 days. Qty: 60 Tab, Refills: 0  
  
allopurinol (ZYLOPRIM) 300 mg tablet Take 1 Tab by mouth daily for 30 days. Qty: 30 Tab, Refills: 0  
  
levoFLOXacin (LEVAQUIN) 500 mg tablet Take 1 Tab by mouth every twenty-four (24) hours for 30 days. Qty: 30 Tab, Refills: 0  
  
cholecalciferol (VITAMIN D3) 400 unit tab tablet Take 2 Tabs by mouth daily. Qty: 60 Tab, Refills: 0  
  
fluconazole (DIFLUCAN) 200 mg tablet Take 1 Tab by mouth daily for 30 days. FDA advises cautious prescribing of oral fluconazole in pregnancy. Qty: 30 Tab, Refills: 0  
  
chlorhexidine (PERIDEX) 0.12 % solution Take 15 mL by mouth two (2) times a day. Qty: 420 mL, Refills: 0  
  
lidocaine-prilocaine (EMLA) topical cream Apply  to affected area as needed for Pain.  Apply to port site 45-60 minutes prior to lab appt or infusion 
Qty: 30 g, Refills: 0  
  
ondansetron hcl (ZOFRAN) 8 mg tablet Take 1 Tab by mouth every eight (8) hours as needed for Nausea. Qty: 90 Tab, Refills: 0  
  
vit C/E/zinc/lutein/zeaxanthin ([address] PO) Take 1 Tab by mouth daily. escitalopram oxalate (LEXAPRO) 10 mg tablet Take 10 mg by mouth daily. LORazepam (ATIVAN) 1 mg tablet Take  by mouth nightly. acetaminophen 500 mg tab 500 mg, diphenhydrAMINE 25 mg cap 25 mg Take  by mouth nightly. pravastatin (PRAVACHOL) 10 mg tablet Take  by mouth nightly. OBJECTIVE: 
Patient Vitals for the past 8 hrs: 
 BP Temp Pulse Resp SpO2  
06/15/19 1125 127/57 98.2 °F (36.8 °C) 67 18 97 % 06/15/19 0734 106/66 98.3 °F (36.8 °C) 64 16 96 % Temp (24hrs), Av.3 °F (36.8 °C), Min:98.2 °F (36.8 °C), Max:98.3 °F (36.8 °C) 
 
06/15 0701 - 06/15 1900 In: 600 [P.O.:600] Out: 900 [Urine:900] Physical Exam: 
Constitutional: Well developed, well nourished female in no acute distress, sitting comfortably on the hospital bed. HEENT: Normocephalic and atraumatic. Oropharynx is clear, mucous membranes are moist.  Extraocular muscles are intact. Sclerae anicteric. Neck supple without JVD. No thyromegaly present. Skin Warm and dry. No bruising and no rash noted. No erythema. No pallor. Respiratory Lungs are clear to auscultation bilaterally without wheezes, rales or rhonchi, normal air exchange without accessory muscle use. CVS Normal rate, regular rhythm and normal S1 and S2. No murmurs, gallops, or rubs. Abdomen Soft, nontender and nondistended, normoactive bowel sounds. No palpable mass. No hepatosplenomegaly. Neuro Grossly nonfocal with no obvious sensory or motor deficits. MSK Normal range of motion in general.  No edema and no tenderness. Psych Appropriate mood and affect. ASSESSMENT: 
 
Active Problems: 
  Acute myeloid leukemia not having achieved remission ([facility] (2019) Pancytopenia due to antineoplastic chemotherapy ([address] (2019) Cellulitis of neck (2019) Immunocompromised status associated with infection ([facility] (6/12/2019) Port or reservoir infection (6/12/2019) DISPOSITION: 
Follow-up Appointments Procedures  FOLLOW UP VISIT Appointment in: Other (Specify) Follow up as previously scheduled with Dr. Nikki Weeks on 6/17 Follow up as previously scheduled with Dr. Nikki Weeks on 6/17 Standing Status:   Standing Number of Occurrences:   1 Order Specific Question:   Appointment in Answer: Other (Specify) Over 30 minutes was spent in discharge planning and coordination of care. Lynne Lincoln NP [address] [place] [address] Hematology & Oncology [address] [place] [address] Office : [phone] Fax : [phone] I personally saw, exammed and counselled the patient, and discussed with NP, agree with above history/assessment/plan. 73 y. o.female AML FLT3+ s/p 7+3/midostaurin day 36, prior strep sepsis responded to rocephin, admitted for port infection, start vanc/cefepime and remove port and culture, plt transfused for procedure, cellulitis improved and eager to go home, dc with clindamycin and follow in office next week. 
Jeremy Crowe M.D. [address] Office : [phone] Fax : [phone]

## 2019-06-14 NOTE — PROGRESS NOTES
[address] Hematology Oncology Inpatient Hematology / Oncology Daily Progress NoteReason for Admission:  Port or reservoir infection [employer] 24 Hour Events: 
Afebrile, VSS 
S/p port removal yesterday BCx (1 of 2) +staph - most likely contaminant Repeat BCx pending  at bedside ROS: 
Constitutional: Negative for fever, chills, weakness, malaise, fatigue. CV: Negative for chest pain, palpitations, edema. Respiratory: Negative for dyspnea, cough, wheezing. GI: Negative for nausea, abdominal pain, diarrhea. 10 point review of systems is otherwise negative with the exception of the elements mentioned above in the HPI. No Known Allergies Past Medical History:  
Diagnosis Date  Cancer ([facility] AML Past Surgical History:  
Procedure Laterality Date  IR INSERT TUNL CVC W PORT OVER 5 YEARS  4/30/2019  IR REMOVE TUNL CVAD W PORT/PUMP  6/13/2019 No family history on file. Social History Socioeconomic History  Marital status:  Spouse name: Not on file  Number of children: Not on file  Years of education: Not on file  Highest education level: Not on file Occupational History  Not on file Social Needs  Financial resource strain: Not on file  Food insecurity:  
  Worry: Not on file Inability: Not on file  Transportation needs:  
  Medical: Not on file Non-medical: Not on file Tobacco Use  Smoking status: Never Smoker  Smokeless tobacco: Never Used Substance and Sexual Activity  Alcohol use: Never Frequency: Never  Drug use: Never  Sexual activity: Not on file Lifestyle  Physical activity:  
  Days per week: Not on file Minutes per session: Not on file  Stress: Not on file Relationships  Social connections:  
  Talks on phone: Not on file Gets together: Not on file Attends Religion service: Not on file Active member of club or organization: Not on file Attends meetings of clubs or organizations: Not on file Relationship status: Not on file  Intimate partner violence:  
  Fear of current or ex partner: Not on file Emotionally abused: Not on file Physically abused: Not on file Forced sexual activity: Not on file Other Topics Concern  Not on file Social History Narrative  Not on file Current Facility-Administered Medications Medication Dose Route Frequency Provider Last Rate Last Dose  
 0.9% sodium chloride infusion 250 mL  250 mL IntraVENous PRN Leny Mcdonnell MD      
 0.9% sodium chloride infusion 250 mL  250 mL IntraVENous PRN Leny Mcdonnell MD      
 diphenhydrAMINE (BENADRYL) capsule 25 mg  25 mg Oral Q6H PRN Leny Mcdonnell MD   25 mg at 06/13/19 5732  acetaminophen (TYLENOL) tablet 650 mg  650 mg Oral Q6H PRN Leny Mcdonnell MD   650 mg at 06/13/19 1556  
 vancomycin (VANCOCIN) 1500 mg in  ml infusion  1,500 mg IntraVENous Q12H Leny Mcdonnell  mL/hr at 06/14/19 1018 1,500 mg at 06/14/19 1018  acyclovir (ZOVIRAX) tablet 400 mg  400 mg Oral BID Rosie Montoya, NP   400 mg at 06/14/19 6634  
 allopurinol (ZYLOPRIM) tablet 300 mg  300 mg Oral DAILY Rosie Montoya, NP   300 mg at 06/14/19 1745  acetaminophen/diphenhydrAMINE (TYLENOL PM EXT STR) 500/25 mg   Oral QHS Rosie Montoya E, NP      
 chlorhexidine (PERIDEX) 0.12 % mouthwash 15 mL  15 mL Oral BID Rosie Montoya, NP   15 mL at 06/14/19 9591  cholecalciferol (VITAMIN D3) tablet 800 Units  800 Units Oral DAILY Rosie Montoya, NP   800 Units at 06/14/19 4737  escitalopram oxalate (LEXAPRO) tablet 10 mg  10 mg Oral DAILY Rosie Montoya, NP   10 mg at 06/14/19 3731  fluconazole (DIFLUCAN) tablet 200 mg  200 mg Oral DAILY Rosie Montoya, NP   200 mg at 06/14/19 3808  LORazepam (ATIVAN) tablet 1 mg  1 mg Oral QHS Rosie Montoya, NP   1 mg at 06/13/19 2157  pravastatin (PRAVACHOL) tablet 10 mg  10 mg Oral QHS Rosie Montoya, ARMANDO   10 mg at 06/13/19 8098  ondansetron (ZOFRAN) injection 4 mg  4 mg IntraVENous Q6H PRN Rosie Montoya, NP      
 prochlorperazine (COMPAZINE) tablet 5-10 mg  5-10 mg Oral Q6H PRN Rosie Montoya, NP      
 cefepime (MAXIPIME) 2 g in 0.9% sodium chloride (MBP/ADV) 100 mL  2 g IntraVENous Q8H Ej Oden  mL/hr at 19 0609 2 g at 19 8932 Facility-Administered Medications Ordered in Other Encounters Medication Dose Route Frequency Provider Last Rate Last Dose  central line flush (saline) syringe 10 mL  10 mL InterCATHeter Q8H Rianna Whitmore NP      
 
 
OBJECTIVE: 
Patient Vitals for the past 8 hrs: 
 BP Temp Pulse Resp SpO2  
19 0307 128/57 98.2 °F (36.8 °C) 61 18 96 % Temp (24hrs), Av.1 °F (36.7 °C), Min:97.8 °F (36.6 °C), Max:98.5 °F (36.9 °C) 
 
 0701 -  1900 In: 360 [P.O.:360] Out: 500 [Urine:500] Physical Exam: 
Constitutional: Well developed, well nourished female in no acute distress, sitting comfortably in the hospital bed. HEENT: Normocephalic and atraumatic. Oropharynx is clear, mucous membranes are moist.  Extraocular muscles are intact. Sclerae anicteric. Neck supple. Skin Warm and dry. No bruising and no rash noted. + erythema and mild edema at port site (port removed). No pallor. Respiratory Lungs are clear to auscultation bilaterally without wheezes, rales or rhonchi, normal air exchange without accessory muscle use. CVS Normal rate, regular rhythm and normal S1 and S2. No murmurs, gallops, or rubs. Abdomen Soft, nontender and nondistended, normoactive bowel sounds. No palpable mass. No hepatosplenomegaly. Neuro Grossly nonfocal with no obvious sensory or motor deficits. MSK Normal range of motion in general.  No edema and no tenderness. Psych Appropriate mood and affect. Labs:   
Recent Results (from the past 24 hour(s)) CULTURE, MEDICAL DEVICE Collection Time: 19  2:30 PM  
Result Value Ref Range Special Requests: CHEST PORT   
 GRAM STAIN PENDING Culture result:     
  NO GROWTH AFTER SHORT PERIOD OF INCUBATION. FURTHER RESULTS TO FOLLOW AFTER OVERNIGHT INCUBATION. Odell Adams Collection Time: 06/13/19  9:05 PM  
Result Value Ref Range Vancomycin,trough 12.1 5 - 20 ug/mL METABOLIC PANEL, COMPREHENSIVE Collection Time: 06/14/19  3:08 AM  
Result Value Ref Range Sodium 143 136 - 145 mmol/L Potassium 3.7 3.5 - 5.1 mmol/L Chloride 109 (H) 98 - 107 mmol/L  
 CO2 26 21 - 32 mmol/L Anion gap 8 7 - 16 mmol/L Glucose 91 65 - 100 mg/dL BUN 7 (L) 8 - 23 MG/DL Creatinine 0.63 0.6 - 1.0 MG/DL  
 GFR est AA >60 >60 ml/min/1.73m2 GFR est non-AA >60 >60 ml/min/1.73m2 Calcium 8.5 8.3 - 10.4 MG/DL Bilirubin, total 0.4 0.2 - 1.1 MG/DL  
 ALT (SGPT) 17 12 - 65 U/L  
 AST (SGOT) 17 15 - 37 U/L Alk. phosphatase 100 50 - 136 U/L Protein, total 6.6 6.3 - 8.2 g/dL Albumin 3.4 3.2 - 4.6 g/dL Globulin 3.2 2.3 - 3.5 g/dL A-G Ratio 1.1 (L) 1.2 - 3.5    
CBC WITH AUTOMATED DIFF Collection Time: 06/14/19  3:08 AM  
Result Value Ref Range WBC 0.4 (LL) 4.3 - 11.1 K/uL  
 RBC 2.72 (L) 4.05 - 5.2 M/uL HGB 8.2 (L) 11.7 - 15.4 g/dL HCT 24.4 (L) 35.8 - 46.3 % MCV 89.7 79.6 - 97.8 FL  
 MCH 30.1 26.1 - 32.9 PG  
 MCHC 33.6 31.4 - 35.0 g/dL  
 RDW 13.5 11.9 - 14.6 % PLATELET 69 (L) 497 - 450 K/uL MPV 10.3 9.4 - 12.3 FL ABSOLUTE NRBC 0.00 0.0 - 0.2 K/uL  
 DF AUTOMATED NEUTROPHILS 22 (L) 43 - 78 % LYMPHOCYTES 64 (H) 13 - 44 % MONOCYTES 11 4.0 - 12.0 % EOSINOPHILS 3 0.5 - 7.8 % BASOPHILS 0 0.0 - 2.0 % IMMATURE GRANULOCYTES 0 0.0 - 5.0 %  
 ABS. NEUTROPHILS 0.1 (L) 1.7 - 8.2 K/UL  
 ABS. LYMPHOCYTES 0.2 (L) 0.5 - 4.6 K/UL  
 ABS. MONOCYTES 0.0 (L) 0.1 - 1.3 K/UL  
 ABS. EOSINOPHILS 0.0 0.0 - 0.8 K/UL  
 ABS. BASOPHILS 0.0 0.0 - 0.2 K/UL  
 ABS. IMM. GRANS. 0.0 0.0 - 0.5 K/UL  
 
 
 
 
ASSESSMENT: 
Problem List  Date Reviewed: 6/10/2019 Codes Class Noted Pancytopenia due to antineoplastic chemotherapy [facility]) ICD-10-CM: D61.810, T45.1X5A 
ICD-9-CM: 284.11, E933.1  6/12/2019 Cellulitis of neck ICD-10-CM: X12.183 ICD-9-CM: 682.1  6/12/2019 Immunocompromised status associated with infection ([facility] ICD-10-CM: D84.9, B99.9 ICD-9-CM: 279.3, 136.9  6/12/2019 Port or reservoir infection ICD-10-CM: G73.370J ICD-9-CM: 999.33  6/12/2019 Acute myeloid leukemia not having achieved remission ([facility] ICD-10-CM: C92.00 ICD-9-CM: 205.00  5/9/2019 Admission for antineoplastic chemotherapy ICD-10-CM: Z51.11 ICD-9-CM: V58.11  5/5/2019 AML (acute myeloblastic leukemia) ([facility] ICD-10-CM: C92.00 ICD-9-CM: 205.00  4/28/2019 Weakness generalized ICD-10-CM: R53.1 ICD-9-CM: 780.79  4/28/2019 Pancytopenia ([facility] ICD-10-CM: J49.364 ICD-9-CM: 284.19  4/28/2019 Thrombocytopenia ([facility] ICD-10-CM: D69.6 ICD-9-CM: 287.5  4/27/2019 Ms. Sebastien Weller is a 68 y.o. female admitted on 06/12/19 with a primary diagnosis of cellulitis of port-a-cath in immunocompromised patient. She has a history of AML, FLT3+ status post 7+3 and Midostaurin. She tolerated this well overall. During her induction admission, she was found to have strep bacteremia and was treated with Rocephin until June 5. She had a blister with surrounding erythema below her port which had improved on clindamycin prior to discharge so she was sent home with this. She completed oral Clindamycin therapy yesterday. She was at the [facility] today for follow up blood cultures and labs/replacements and complained of increased area of redness and pain around her port-a-cath. The area of concern is as big as a grapefruit. We are still awaiting count recovery - she remains quite neutropenic with a total WBC count of 200. She will be admitted for IV antibiotics and she will have her port removed.  She denies any nausea, vomiting, bowel issues, dyspnea, cough, fevers, chills. PLAN: 
Acute Myelogenous Leukemia, +FLT3 * Status post 7+3 and Midostaurin. * Awaiting count recovery * Plans for OP BMbx on 6/19 6/14 Day 37 s/p 7+3. Completed Midostaurin on 5/30 Immunocompromised * Continue Diflucan and Acyclovir prophylaxis. Cellulitis Around Port-a-Cath * Follow cultures. * Consult IR for removal. 
* Vancomycin, pharmacy to dose. 6/13 IR removing port today. BCx-NGTD. On Cef/Vanc. 
6/14 s/p port removal yesterday. BCx (1 of 2) +staph. Most likely contaminant. Repeat BCx. Con't Cef/Vanc. Pancytopenia secondary to chemotherapy - Transfuse prn per [facility] SOPs Continue home meds Prophylactic Antibx: Acyclovir, Diflucan [facility] SOPs SCDs for DVT prophylaxis due to thrombocytopenia Dillan Katz  [address] Hematology Oncology [address] [place] [address] Office : [phone] Fax : [phone] I personally saw, exammed and counselled the patient, and discussed with NP, agree with above history/assessment/plan. 68 y. o.female AML FLT3+ s/p 7+3/midostaurin day 36, prior strep sepsis responded to rocephin, admitted for port infection, start vanc/cefepime and remove port and culture, plt transfused for procedure, cellulitis improved but , continue iv vanc. 
  
 
Joey Renee M.D. [address] Office : [phone] Fax : [phone]

## 2019-06-15 VITALS
RESPIRATION RATE: 18 BRPM | HEART RATE: 67 BPM | TEMPERATURE: 98.2 F | DIASTOLIC BLOOD PRESSURE: 57 MMHG | HEIGHT: 66 IN | BODY MASS INDEX: 30.36 KG/M2 | OXYGEN SATURATION: 97 % | WEIGHT: 188.9 LBS | SYSTOLIC BLOOD PRESSURE: 127 MMHG

## 2019-06-15 LAB
ALBUMIN SERPL-MCNC: 3.2 G/DL (ref 3.2–4.6)
ALBUMIN/GLOB SERPL: 1.1 {RATIO} (ref 1.2–3.5)
ALP SERPL-CCNC: 97 U/L (ref 50–136)
ALT SERPL-CCNC: 15 U/L (ref 12–65)
ANION GAP SERPL CALC-SCNC: 7 MMOL/L (ref 7–16)
AST SERPL-CCNC: 9 U/L (ref 15–37)
BILIRUB SERPL-MCNC: 0.5 MG/DL (ref 0.2–1.1)
BUN SERPL-MCNC: 8 MG/DL (ref 8–23)
CALCIUM SERPL-MCNC: 8.3 MG/DL (ref 8.3–10.4)
CHLORIDE SERPL-SCNC: 110 MMOL/L (ref 98–107)
CO2 SERPL-SCNC: 26 MMOL/L (ref 21–32)
CREAT SERPL-MCNC: 0.52 MG/DL (ref 0.6–1)
DIFFERENTIAL METHOD BLD: ABNORMAL
ERYTHROCYTE [DISTWIDTH] IN BLOOD BY AUTOMATED COUNT: 13.4 % (ref 11.9–14.6)
GLOBULIN SER CALC-MCNC: 3 G/DL (ref 2.3–3.5)
GLUCOSE SERPL-MCNC: 102 MG/DL (ref 65–100)
HCT VFR BLD AUTO: 23.3 % (ref 35.8–46.3)
HGB BLD-MCNC: 7.9 G/DL (ref 11.7–15.4)
MCH RBC QN AUTO: 30.2 PG (ref 26.1–32.9)
MCHC RBC AUTO-ENTMCNC: 33.9 G/DL (ref 31.4–35)
MCV RBC AUTO: 88.9 FL (ref 79.6–97.8)
NRBC # BLD: 0 K/UL (ref 0–0.2)
PLATELET # BLD AUTO: 57 K/UL (ref 150–450)
PLATELET COMMENTS,PCOM: ABNORMAL
PMV BLD AUTO: 10.3 FL (ref 9.4–12.3)
POTASSIUM SERPL-SCNC: 3.3 MMOL/L (ref 3.5–5.1)
PROT SERPL-MCNC: 6.2 G/DL (ref 6.3–8.2)
RBC # BLD AUTO: 2.62 M/UL (ref 4.05–5.2)
RBC MORPH BLD: ABNORMAL
SODIUM SERPL-SCNC: 143 MMOL/L (ref 136–145)
WBC # BLD AUTO: 0.4 K/UL (ref 4.3–11.1)
WBC MORPH BLD: ABNORMAL

## 2019-06-15 PROCEDURE — 74011000258 HC RX REV CODE- 258: Performed by: INTERNAL MEDICINE

## 2019-06-15 PROCEDURE — 74011000250 HC RX REV CODE- 250: Performed by: NURSE PRACTITIONER

## 2019-06-15 PROCEDURE — 85025 COMPLETE CBC W/AUTO DIFF WBC: CPT

## 2019-06-15 PROCEDURE — 99239 HOSP IP/OBS DSCHRG MGMT >30: CPT | Performed by: INTERNAL MEDICINE

## 2019-06-15 PROCEDURE — 74011250637 HC RX REV CODE- 250/637: Performed by: NURSE PRACTITIONER

## 2019-06-15 PROCEDURE — 80053 COMPREHEN METABOLIC PANEL: CPT

## 2019-06-15 PROCEDURE — 74011250637 HC RX REV CODE- 250/637: Performed by: INTERNAL MEDICINE

## 2019-06-15 PROCEDURE — 74011250636 HC RX REV CODE- 250/636: Performed by: INTERNAL MEDICINE

## 2019-06-15 PROCEDURE — 36415 COLL VENOUS BLD VENIPUNCTURE: CPT

## 2019-06-15 RX ORDER — POTASSIUM CHLORIDE 20 MEQ/1
20 TABLET, EXTENDED RELEASE ORAL 2 TIMES DAILY
Status: DISCONTINUED | OUTPATIENT
Start: 2019-06-15 | End: 2019-06-15 | Stop reason: HOSPADM

## 2019-06-15 RX ORDER — CLINDAMYCIN HYDROCHLORIDE 300 MG/1
600 CAPSULE ORAL 3 TIMES DAILY
Qty: 60 CAP | Refills: 0 | Status: SHIPPED | OUTPATIENT
Start: 2019-06-15 | End: 2019-06-25

## 2019-06-15 RX ADMIN — ALLOPURINOL 300 MG: 300 TABLET ORAL at 07:55

## 2019-06-15 RX ADMIN — CHOLECALCIFEROL TAB 10 MCG (400 UNIT) 800 UNITS: 10 TAB at 07:55

## 2019-06-15 RX ADMIN — CEFEPIME HYDROCHLORIDE 2 G: 2 INJECTION, POWDER, FOR SOLUTION INTRAVENOUS at 06:00

## 2019-06-15 RX ADMIN — POTASSIUM CHLORIDE 20 MEQ: 20 TABLET, EXTENDED RELEASE ORAL at 07:56

## 2019-06-15 RX ADMIN — ESCITALOPRAM OXALATE 10 MG: 10 TABLET ORAL at 07:55

## 2019-06-15 RX ADMIN — CHLORHEXIDINE GLUCONATE 15 ML: 1.2 RINSE ORAL at 07:57

## 2019-06-15 RX ADMIN — VANCOMYCIN HYDROCHLORIDE 1500 MG: 10 INJECTION, POWDER, LYOPHILIZED, FOR SOLUTION INTRAVENOUS at 09:43

## 2019-06-15 RX ADMIN — ACYCLOVIR 400 MG: 800 TABLET ORAL at 07:56

## 2019-06-15 RX ADMIN — FLUCONAZOLE 200 MG: 100 TABLET ORAL at 07:55

## 2019-06-15 NOTE — PROGRESS NOTES
Written and verbal d/c instructions provided to and reviewed with pt and spouse. Both verbalize understanding. All questions answered. Pt escorted to lobby via wheelchair escorted by staff and discharged home with spouse.

## 2019-06-15 NOTE — PROGRESS NOTES
END OF SHIFT NOTE: 
 
Intake/Output No intake/output data recorded. Voiding: YES Catheter: NO 
Drain:   
 
 
 
 
Stool:  0 occurrences. Stool Assessment Stool Appearance: Formed (06/14/19 1523) Stool Amount: Small (06/14/19 1523) Emesis:  0 occurrences. VITAL SIGNS Patient Vitals for the past 12 hrs: 
 Temp Pulse Resp BP SpO2  
06/15/19 0327 98.2 °F (36.8 °C) 62 16 132/67 96 % 06/14/19 2307 98.2 °F (36.8 °C) 68 17 141/57 95 % 06/14/19 1925 98.3 °F (36.8 °C) 71 18 150/59 97 % Pain Assessment Pain 1 Pain Scale 1: Numeric (0 - 10) (06/15/19 0350) Pain Intensity 1: 0 (06/15/19 0350) Patient Stated Pain Goal: 0 (06/15/19 0350) Pain Reassessment 1: Patient resting w/respiratory rate greater than 10 (06/14/19 0142) Pain Location 1: Chest (06/13/19 1420) Pain Orientation 1: Right;Upper (06/13/19 1420) Pain Intervention(s) 1: Medication (see MAR) (06/13/19 1420) Ambulating Yes Additional Information: VSS/Afebrile Shift report given to oncoming nurse at the bedside.  
 
Long Segovia RN

## 2019-06-15 NOTE — DISCHARGE INSTRUCTIONS
DISCHARGE SUMMARY from Nurse    PATIENT INSTRUCTIONS:    After general anesthesia or intravenous sedation, for 24 hours or while taking prescription Narcotics:  · Limit your activities  · Do not drive and operate hazardous machinery  · Do not make important personal or business decisions  · Do  not drink alcoholic beverages  · If you have not urinated within 8 hours after discharge, please contact your doctor on call. Report the following to your surgeon:  · Excessive pain, swelling, redness or odor of or around the surgical area  · Temperature over 100.5  · Nausea and vomiting lasting longer than 4 hours or if unable to take medications  · Any signs of decreased circulation or nerve impairment to extremity: change in color, persistent  numbness, tingling, coldness or increase pain  · Any questions    What to do at Home:  Recommended activity: Activity as tolerated    Please call physician after your discharge if the following symptoms present:    1. Temperature greater than 100.5 (check temp every day)  2. Heart rate greater than 90 beats per minute  3. Increased respirations   4. Chills  5. Cough with any sputum (color: yellow, white, green, or bloody?)  6. Shortness of breath or change in breathing pattern  7. Bleeding:  Any prolonged bleeding presented from skin tears, cuts, bleeding from brushing teeth, nose bleed, bleeding noted in stool  8. Tenderness, swelling, or drainage from IV site or central line catheter    Diet: Regular    Neutropenic Precautions  Thrombocytopenic Precautions    *  Please give a list of your current medications to your Primary Care Provider. *  Please update this list whenever your medications are discontinued, doses are      changed, or new medications (including over-the-counter products) are added. *  Please carry medication information at all times in case of emergency situations.     These are general instructions for a healthy lifestyle:    No smoking/ No tobacco products/ Avoid exposure to second hand smoke  Surgeon General's Warning:  Quitting smoking now greatly reduces serious risk to your health. Obesity, smoking, and sedentary lifestyle greatly increases your risk for illness    A healthy diet, regular physical exercise & weight monitoring are important for maintaining a healthy lifestyle    You may be retaining fluid if you have a history of heart failure or if you experience any of the following symptoms:  Weight gain of 3 pounds or more overnight or 5 pounds in a week, increased swelling in our hands or feet or shortness of breath while lying flat in bed. Please call your doctor as soon as you notice any of these symptoms; do not wait until your next office visit. Recognize signs and symptoms of STROKE:    F-face looks uneven    A-arms unable to move or move unevenly    S-speech slurred or non-existent    T-time-call 911 as soon as signs and symptoms begin-DO NOT go       Back to bed or wait to see if you get better-TIME IS BRAIN. Warning Signs of HEART ATTACK     Call 911 if you have these symptoms:   Chest discomfort. Most heart attacks involve discomfort in the center of the chest that lasts more than a few minutes, or that goes away and comes back. It can feel like uncomfortable pressure, squeezing, fullness, or pain.  Discomfort in other areas of the upper body. Symptoms can include pain or discomfort in one or both arms, the back, neck, jaw, or stomach.  Shortness of breath with or without chest discomfort.  Other signs may include breaking out in a cold sweat, nausea, or lightheadedness. Don't wait more than five minutes to call 911 - MINUTES MATTER! Fast action can save your life. Calling 911 is almost always the fastest way to get lifesaving treatment. Emergency Medical Services staff can begin treatment when they arrive -- up to an hour sooner than if someone gets to the hospital by car.      The discharge information has been reviewed with the patient and spouse. The patient and spouse verbalized understanding. Discharge medications reviewed with the patient and spouse and appropriate educational materials and side effects teaching were provided.   ___________________________________________________________________________________________________________________________________

## 2019-06-16 LAB
BACTERIA SPEC CULT: ABNORMAL
GRAM STN SPEC: ABNORMAL
GRAM STN SPEC: ABNORMAL
SERVICE CMNT-IMP: ABNORMAL

## 2019-06-17 ENCOUNTER — HOSPITAL ENCOUNTER (OUTPATIENT)
Dept: LAB | Age: 74
Discharge: HOME | End: 2019-06-17
Payer: MEDICARE

## 2019-06-17 ENCOUNTER — HOSPITAL ENCOUNTER (OUTPATIENT)
Dept: INFUSION THERAPY | Age: 74
Discharge: HOME | End: 2019-06-17
Payer: MEDICARE

## 2019-06-17 ENCOUNTER — PATIENT OUTREACH (OUTPATIENT)
Dept: CASE MANAGEMENT | Age: 74
End: 2019-06-17

## 2019-06-17 DIAGNOSIS — C92.00 ACUTE MYELOID LEUKEMIA NOT HAVING ACHIEVED REMISSION (HCC): Primary | ICD-10-CM

## 2019-06-17 DIAGNOSIS — C92.00 ACUTE MYELOID LEUKEMIA NOT HAVING ACHIEVED REMISSION (HCC): ICD-10-CM

## 2019-06-17 LAB
ALBUMIN SERPL-MCNC: 3.8 G/DL (ref 3.2–4.6)
ALBUMIN/GLOB SERPL: 1.1 {RATIO} (ref 1.2–3.5)
ALP SERPL-CCNC: 115 U/L (ref 50–136)
ALT SERPL-CCNC: 20 U/L (ref 12–65)
ANION GAP SERPL CALC-SCNC: 6 MMOL/L (ref 7–16)
AST SERPL-CCNC: 16 U/L (ref 15–37)
BACTERIA SPEC CULT: NORMAL
BASOPHILS # BLD: 0 K/UL (ref 0–0.2)
BASOPHILS NFR BLD: 0 % (ref 0–2)
BILIRUB SERPL-MCNC: 0.5 MG/DL (ref 0.2–1.1)
BUN SERPL-MCNC: 8 MG/DL (ref 8–23)
CALCIUM SERPL-MCNC: 9.1 MG/DL (ref 8.3–10.4)
CHLORIDE SERPL-SCNC: 107 MMOL/L (ref 98–107)
CO2 SERPL-SCNC: 27 MMOL/L (ref 21–32)
CREAT SERPL-MCNC: 0.64 MG/DL (ref 0.6–1)
DIFFERENTIAL METHOD BLD: ABNORMAL
EOSINOPHIL # BLD: 0 K/UL (ref 0–0.8)
EOSINOPHIL NFR BLD: 0 % (ref 0.5–7.8)
ERYTHROCYTE [DISTWIDTH] IN BLOOD BY AUTOMATED COUNT: 14.4 % (ref 11.9–14.6)
GLOBULIN SER CALC-MCNC: 3.6 G/DL (ref 2.3–3.5)
GLUCOSE SERPL-MCNC: 100 MG/DL (ref 65–100)
HCT VFR BLD AUTO: 25.3 % (ref 35.8–46.3)
HGB BLD-MCNC: 8.7 G/DL (ref 11.7–15.4)
IMM GRANULOCYTES # BLD AUTO: 0 K/UL (ref 0–0.5)
IMM GRANULOCYTES NFR BLD AUTO: 0 % (ref 0–5)
LYMPHOCYTES # BLD: 0.2 K/UL (ref 0.5–4.6)
LYMPHOCYTES NFR BLD: 36 % (ref 13–44)
MAGNESIUM SERPL-MCNC: 2.3 MG/DL (ref 1.8–2.4)
MCH RBC QN AUTO: 30 PG (ref 26.1–32.9)
MCHC RBC AUTO-ENTMCNC: 34.4 G/DL (ref 31.4–35)
MCV RBC AUTO: 87.2 FL (ref 79.6–97.8)
MONOCYTES # BLD: 0.1 K/UL (ref 0.1–1.3)
MONOCYTES NFR BLD: 20 % (ref 4–12)
NEUTS SEG # BLD: 0.2 K/UL (ref 1.7–8.2)
NEUTS SEG NFR BLD: 44 % (ref 43–78)
NRBC # BLD: 0.01 K/UL (ref 0–0.2)
PLATELET # BLD AUTO: 55 K/UL (ref 150–450)
PMV BLD AUTO: 10.4 FL (ref 9.4–12.3)
POTASSIUM SERPL-SCNC: 3.3 MMOL/L (ref 3.5–5.1)
PROT SERPL-MCNC: 7.4 G/DL (ref 6.3–8.2)
RBC # BLD AUTO: 2.9 M/UL (ref 4.05–5.25)
SERVICE CMNT-IMP: NORMAL
SODIUM SERPL-SCNC: 140 MMOL/L (ref 136–145)
WBC # BLD AUTO: 0.5 K/UL (ref 4.3–11.1)

## 2019-06-17 PROCEDURE — 87040 BLOOD CULTURE FOR BACTERIA: CPT

## 2019-06-17 PROCEDURE — 83735 ASSAY OF MAGNESIUM: CPT

## 2019-06-17 PROCEDURE — 85025 COMPLETE CBC W/AUTO DIFF WBC: CPT

## 2019-06-17 PROCEDURE — 80053 COMPREHEN METABOLIC PANEL: CPT

## 2019-06-17 PROCEDURE — 36415 COLL VENOUS BLD VENIPUNCTURE: CPT

## 2019-06-17 NOTE — PROGRESS NOTES
This note will not be viewable in [address] Transition of Care Discharge Follow-up Questionnaire Date/Time of Call: 
 6/17/19 
 1113am  
What was the patient hospitalized for? Port or reservoir infection AML Does the patient understand his/her diagnosis and/or treatment and what happened during the hospitalization? Yes, spoke with patients , Marivel Tabares, he states understanding of diagnosis and treatment; and is agreeable to call. Jackson states patient is doing well, at oncology follow up at this time Did the patient receive discharge instructions? Yes   
CM Assessed Risk for Readmission:  
 
 
Patient stated Risk for Readmission: Moderate/high r/t diagnosis, comorbidities and/or treatment 
 
none stated Review any discharge instructions (see discharge instructions/AVS in [facility]). Ask patient if they understand these. Do they have any questions? Reviewed, understanding is stated, no questions at this time Were home services ordered (nursing, PT, OT, ST, etc.)? No 
  
If so, has the first visit occurred? If not, why? (Assist with coordination of services if necessary.) 
 N/A Was any DME ordered? No 
  
If so, has it been received? If not, why?  (Assist patient in obtaining DME orders &/or equipment if necessary.) N/A Complete a review of all medications (new, continued and discontinued meds per the D/C instructions and medication tab in [facility]). Completed START taking: 
clindamycin 300 mg capsule (CLEOCIN) Were all new prescriptions filled? If not, why?  (Assist patient in obtaining medications if necessary  escalate for CCM &/or SW if ongoing issues are verbalized by pt or anticipated) Yes Does the patient understand the purpose and dosing instructions for all medications? (If patient has questions, provide explanation and education.) Yes Does the patient have any problems in performing ADLs? (If patient is unable to perform ADLs  what is the limiting factor(s)? Do they have a support system that can assist? If no support system is present, discuss possible assistance that they may be able to obtain. Escalate for CCM/SW if ongoing issues are verbalized by pt or anticipated) Independent with ADLs, Jackson assist if needed Does the patient have all follow-up appointments scheduled? 7 day f/up with PCP?  
(f/up with PCP may be w/in 14 days if patient has a f/up with their specialist w/in 7 days) 7-14 day f/up with specialist?  
(or per discharge instructions) If f/up has not been made  what actions has the care coordinator made to accomplish this? Has transportation been arranged? Yes Dr. Lakesha Pickett ([facility]) 6/18/19 Dr. Shruthi Fletcher 6/17/19 Yes, there are no transportation needs at this time. Any other questions or concerns expressed by the patient? No other needs or concerns identified. Jackson states his gratitude for follow up. Contact information for [facility] was given, instructed to call with new questions or concerns. Schedule next appointment with MARIAH LEO Coordinator or refer to RN Case Manager/ per the workflow guidelines. When is care coordinators next follow-up call scheduled? If referred for CCM  what RN care manager was the referral assigned? Due to chronic disease process patient will likely have unavoidable hospitalizations and medical interventions for remainder of treatment, as well as patient and family knowledge of disease and treatment, no further TOBY outreach is needed. Patient is closely followed by oncology nurse navigator TOBY Call Completed By: Bryant Daily LPN Care Coordinator

## 2019-06-17 NOTE — PROGRESS NOTES
6/17/19:  Patient in for follow-up with Dr. Ariana Ward. Patient discharged on oral clindamycin and levaquin. Port culture sensitivities show susceptibility to vancomycin, rifampin and tetracycline. Dr. Ariana Ward to discuss with Dr. Velasquez Ashton to determine best treatment for staph epidermidis. Patient to have repeat blood cultures peripherally today. She will have an echo and bone marrow this week. Patient to return for labs only early next week. After bone marrow biopsy results, this navigator to discuss next steps with Dr. Ariana Ward.

## 2019-06-18 ENCOUNTER — HOSPITAL ENCOUNTER (OUTPATIENT)
Dept: INFUSION THERAPY | Age: 74
Discharge: HOME | End: 2019-06-18
Payer: MEDICARE

## 2019-06-18 VITALS
HEART RATE: 86 BPM | RESPIRATION RATE: 18 BRPM | TEMPERATURE: 99.4 F | OXYGEN SATURATION: 98 % | SYSTOLIC BLOOD PRESSURE: 158 MMHG | DIASTOLIC BLOOD PRESSURE: 58 MMHG

## 2019-06-18 DIAGNOSIS — T80.212A PORT OR RESERVOIR INFECTION, INITIAL ENCOUNTER: ICD-10-CM

## 2019-06-18 DIAGNOSIS — C92.00 ACUTE MYELOID LEUKEMIA NOT HAVING ACHIEVED REMISSION (HCC): Primary | ICD-10-CM

## 2019-06-18 PROCEDURE — 74011000250 HC RX REV CODE- 250: Performed by: INTERNAL MEDICINE

## 2019-06-18 PROCEDURE — 74011250636 HC RX REV CODE- 250/636: Performed by: INTERNAL MEDICINE

## 2019-06-18 PROCEDURE — 96374 THER/PROPH/DIAG INJ IV PUSH: CPT

## 2019-06-18 RX ADMIN — DAPTOMYCIN 500 MG: 500 INJECTION, POWDER, LYOPHILIZED, FOR SOLUTION INTRAVENOUS at 08:36

## 2019-06-18 NOTE — PROGRESS NOTES
Tolerated Daptomycin without reaction. Patient observed x 30 minutes post medication. Patient discharged via ambulation accompanied by . Instructed to notify physician of any problems, questions or concerns after discharge. Next appointment is 06/19/19 at 0800 with Rosana. Patient desires to have labs drawn in Infusion due to limited venous access.

## 2019-06-18 NOTE — PROGRESS NOTES
Reviewed Daptomycin antibiotic with patient and . Teaching handout given and reviewed for symptoms of reactions and symptoms to notify MD about. Reviewed methods to assist in diarrhea prevention. Verbalizes understanding.

## 2019-06-19 ENCOUNTER — HOSPITAL ENCOUNTER (OUTPATIENT)
Dept: INFUSION THERAPY | Age: 74
Discharge: HOME | End: 2019-06-19
Payer: MEDICARE

## 2019-06-19 ENCOUNTER — HOSPITAL ENCOUNTER (OUTPATIENT)
Dept: CT IMAGING | Age: 74
Discharge: HOME | End: 2019-06-19
Attending: INTERNAL MEDICINE
Payer: MEDICARE

## 2019-06-19 ENCOUNTER — HOSPITAL ENCOUNTER (OUTPATIENT)
Dept: LAB | Age: 74
Discharge: HOME | End: 2019-06-19
Attending: INTERNAL MEDICINE
Payer: MEDICARE

## 2019-06-19 VITALS
DIASTOLIC BLOOD PRESSURE: 82 MMHG | HEART RATE: 69 BPM | WEIGHT: 184 LBS | TEMPERATURE: 98.8 F | OXYGEN SATURATION: 99 % | RESPIRATION RATE: 18 BRPM | BODY MASS INDEX: 29.7 KG/M2 | SYSTOLIC BLOOD PRESSURE: 124 MMHG

## 2019-06-19 VITALS
HEIGHT: 66 IN | SYSTOLIC BLOOD PRESSURE: 145 MMHG | DIASTOLIC BLOOD PRESSURE: 67 MMHG | OXYGEN SATURATION: 91 % | TEMPERATURE: 99.2 F | BODY MASS INDEX: 29.73 KG/M2 | WEIGHT: 185 LBS | HEART RATE: 65 BPM | RESPIRATION RATE: 14 BRPM

## 2019-06-19 DIAGNOSIS — C92.00 ACUTE MYELOID LEUKEMIA NOT HAVING ACHIEVED REMISSION (HCC): ICD-10-CM

## 2019-06-19 DIAGNOSIS — C92.00 ACUTE MYELOID LEUKEMIA NOT HAVING ACHIEVED REMISSION (HCC): Primary | ICD-10-CM

## 2019-06-19 LAB
BACTERIA SPEC CULT: NORMAL
BASOPHILS # BLD: 0 K/UL (ref 0–0.2)
BASOPHILS # BLD: 0 K/UL (ref 0–0.2)
BASOPHILS NFR BLD: 0 % (ref 0–2)
BASOPHILS NFR BLD: 0 % (ref 0–2)
BONE MARROW PREP & W,BMA: NORMAL
DIFFERENTIAL METHOD BLD: ABNORMAL
DIFFERENTIAL METHOD BLD: ABNORMAL
EOSINOPHIL # BLD: 0 K/UL (ref 0–0.8)
EOSINOPHIL # BLD: 0 K/UL (ref 0–0.8)
EOSINOPHIL NFR BLD: 0 % (ref 0.5–7.8)
EOSINOPHIL NFR BLD: 0 % (ref 0.5–7.8)
ERYTHROCYTE [DISTWIDTH] IN BLOOD BY AUTOMATED COUNT: 15 % (ref 11.9–14.6)
ERYTHROCYTE [DISTWIDTH] IN BLOOD BY AUTOMATED COUNT: 15.6 % (ref 11.9–14.6)
HCT VFR BLD AUTO: 24.7 % (ref 35.8–46.3)
HCT VFR BLD AUTO: 24.8 % (ref 35.8–46.3)
HGB BLD-MCNC: 8.3 G/DL (ref 11.7–15.4)
HGB BLD-MCNC: 8.4 G/DL (ref 11.7–15.4)
IMM GRANULOCYTES # BLD AUTO: 0 K/UL (ref 0–0.5)
IMM GRANULOCYTES # BLD AUTO: 0 K/UL (ref 0–0.5)
IMM GRANULOCYTES NFR BLD AUTO: 0 % (ref 0–5)
IMM GRANULOCYTES NFR BLD AUTO: 2 % (ref 0–5)
LYMPHOCYTES # BLD: 0.2 K/UL (ref 0.5–4.6)
LYMPHOCYTES # BLD: 0.3 K/UL (ref 0.5–4.6)
LYMPHOCYTES NFR BLD: 36 % (ref 13–44)
LYMPHOCYTES NFR BLD: 42 % (ref 13–44)
MCH RBC QN AUTO: 30 PG (ref 26.1–32.9)
MCH RBC QN AUTO: 30.3 PG (ref 26.1–32.9)
MCHC RBC AUTO-ENTMCNC: 33.5 G/DL (ref 31.4–35)
MCHC RBC AUTO-ENTMCNC: 34 G/DL (ref 31.4–35)
MCV RBC AUTO: 88.2 FL (ref 79.6–97.8)
MCV RBC AUTO: 90.5 FL (ref 79.6–97.8)
MONOCYTES # BLD: 0.1 K/UL (ref 0.1–1.3)
MONOCYTES # BLD: 0.2 K/UL (ref 0.1–1.3)
MONOCYTES NFR BLD: 23 % (ref 4–12)
MONOCYTES NFR BLD: 25 % (ref 4–12)
NEUTS SEG # BLD: 0.2 K/UL (ref 1.7–8.2)
NEUTS SEG # BLD: 0.2 K/UL (ref 1.7–8.2)
NEUTS SEG NFR BLD: 33 % (ref 43–78)
NEUTS SEG NFR BLD: 39 % (ref 43–78)
NRBC # BLD: 0 K/UL (ref 0–0.2)
NRBC # BLD: 0 K/UL (ref 0–0.2)
PLATELET # BLD AUTO: 47 K/UL (ref 150–450)
PLATELET # BLD AUTO: 51 K/UL (ref 150–450)
PMV BLD AUTO: 10.7 FL (ref 9.4–12.3)
PMV BLD AUTO: 11.3 FL (ref 9.4–12.3)
RBC # BLD AUTO: 2.74 M/UL (ref 4.05–5.2)
RBC # BLD AUTO: 2.8 M/UL (ref 4.05–5.25)
SERVICE CMNT-IMP: NORMAL
WBC # BLD AUTO: 0.6 K/UL (ref 4.3–11.1)
WBC # BLD AUTO: 0.6 K/UL (ref 4.3–11.1)

## 2019-06-19 PROCEDURE — 88305 TISSUE EXAM BY PATHOLOGIST: CPT

## 2019-06-19 PROCEDURE — 74011250636 HC RX REV CODE- 250/636: Performed by: RADIOLOGY

## 2019-06-19 PROCEDURE — 85025 COMPLETE CBC W/AUTO DIFF WBC: CPT

## 2019-06-19 PROCEDURE — 88342 IMHCHEM/IMCYTCHM 1ST ANTB: CPT

## 2019-06-19 PROCEDURE — 88312 SPECIAL STAINS GROUP 1: CPT

## 2019-06-19 PROCEDURE — 74011000250 HC RX REV CODE- 250: Performed by: INTERNAL MEDICINE

## 2019-06-19 PROCEDURE — 96374 THER/PROPH/DIAG INJ IV PUSH: CPT

## 2019-06-19 PROCEDURE — 88313 SPECIAL STAINS GROUP 2: CPT

## 2019-06-19 PROCEDURE — 88185 FLOWCYTOMETRY/TC ADD-ON: CPT

## 2019-06-19 PROCEDURE — 99153 MOD SED SAME PHYS/QHP EA: CPT

## 2019-06-19 PROCEDURE — 77012 CT SCAN FOR NEEDLE BIOPSY: CPT

## 2019-06-19 PROCEDURE — 99152 MOD SED SAME PHYS/QHP 5/>YRS: CPT

## 2019-06-19 PROCEDURE — 88341 IMHCHEM/IMCYTCHM EA ADD ANTB: CPT

## 2019-06-19 PROCEDURE — 88184 FLOWCYTOMETRY/ TC 1 MARKER: CPT

## 2019-06-19 PROCEDURE — 74011250636 HC RX REV CODE- 250/636

## 2019-06-19 PROCEDURE — 88311 DECALCIFY TISSUE: CPT

## 2019-06-19 PROCEDURE — 74011250636 HC RX REV CODE- 250/636: Performed by: INTERNAL MEDICINE

## 2019-06-19 RX ORDER — SODIUM CHLORIDE 0.9 % (FLUSH) 0.9 %
10 SYRINGE (ML) INJECTION AS NEEDED
Status: ACTIVE | OUTPATIENT
Start: 2019-06-19 | End: 2019-06-19

## 2019-06-19 RX ORDER — LIDOCAINE HYDROCHLORIDE 20 MG/ML
20-200 INJECTION, SOLUTION INFILTRATION; PERINEURAL
Status: DISCONTINUED | OUTPATIENT
Start: 2019-06-19 | End: 2019-06-19 | Stop reason: ALTCHOICE

## 2019-06-19 RX ORDER — DIPHENHYDRAMINE HYDROCHLORIDE 50 MG/ML
12.5-5 INJECTION, SOLUTION INTRAMUSCULAR; INTRAVENOUS ONCE
Status: ACTIVE | OUTPATIENT
Start: 2019-06-19 | End: 2019-06-19

## 2019-06-19 RX ORDER — MIDAZOLAM HYDROCHLORIDE 1 MG/ML
.25-2 INJECTION, SOLUTION INTRAMUSCULAR; INTRAVENOUS
Status: DISCONTINUED | OUTPATIENT
Start: 2019-06-19 | End: 2019-06-19 | Stop reason: ALTCHOICE

## 2019-06-19 RX ORDER — HYDROCODONE BITARTRATE AND ACETAMINOPHEN 7.5; 325 MG/1; MG/1
1 TABLET ORAL
Status: DISCONTINUED | OUTPATIENT
Start: 2019-06-19 | End: 2019-06-23 | Stop reason: HOSPADM

## 2019-06-19 RX ORDER — IBUPROFEN 200 MG
600 TABLET ORAL
Status: DISCONTINUED | OUTPATIENT
Start: 2019-06-19 | End: 2019-06-23 | Stop reason: HOSPADM

## 2019-06-19 RX ORDER — FENTANYL CITRATE 50 UG/ML
25-100 INJECTION, SOLUTION INTRAMUSCULAR; INTRAVENOUS
Status: DISCONTINUED | OUTPATIENT
Start: 2019-06-19 | End: 2019-06-19 | Stop reason: ALTCHOICE

## 2019-06-19 RX ORDER — SODIUM CHLORIDE 9 MG/ML
150 INJECTION, SOLUTION INTRAVENOUS CONTINUOUS
Status: DISCONTINUED | OUTPATIENT
Start: 2019-06-19 | End: 2019-06-20 | Stop reason: HOSPADM

## 2019-06-19 RX ORDER — SODIUM CHLORIDE 9 MG/ML
25 INJECTION, SOLUTION INTRAVENOUS ONCE
Status: COMPLETED | OUTPATIENT
Start: 2019-06-19 | End: 2019-06-19

## 2019-06-19 RX ADMIN — MIDAZOLAM HYDROCHLORIDE 0.5 MG: 1 INJECTION, SOLUTION INTRAMUSCULAR; INTRAVENOUS at 12:40

## 2019-06-19 RX ADMIN — FENTANYL CITRATE 25 MCG: 50 INJECTION, SOLUTION INTRAMUSCULAR; INTRAVENOUS at 12:40

## 2019-06-19 RX ADMIN — Medication 10 ML: at 09:00

## 2019-06-19 RX ADMIN — MIDAZOLAM HYDROCHLORIDE 1 MG: 1 INJECTION, SOLUTION INTRAMUSCULAR; INTRAVENOUS at 12:35

## 2019-06-19 RX ADMIN — DAPTOMYCIN 500 MG: 500 INJECTION, POWDER, LYOPHILIZED, FOR SOLUTION INTRAVENOUS at 09:05

## 2019-06-19 RX ADMIN — FENTANYL CITRATE 50 MCG: 50 INJECTION, SOLUTION INTRAMUSCULAR; INTRAVENOUS at 12:35

## 2019-06-19 RX ADMIN — SODIUM CHLORIDE 25 ML/HR: 900 INJECTION, SOLUTION INTRAVENOUS at 12:26

## 2019-06-19 RX ADMIN — Medication 10 ML: at 09:10

## 2019-06-19 RX ADMIN — LIDOCAINE HYDROCHLORIDE 120 MG: 20 INJECTION, SOLUTION INFILTRATION; PERINEURAL at 12:47

## 2019-06-19 NOTE — PROCEDURES
Department of Interventional Radiology  [phone]        Interventional Radiology Brief Procedure Note    Patient: Barbara Cordero MRN: [id number]  SSN: [id number]    YOB: 1945  Age: 68 y.o. Sex: female      Date of Procedure: 6/19/2019    Pre-Procedure Diagnosis: AML    Post-Procedure Diagnosis: SAME    Procedure(s): Image Guided Biopsy    Brief Description of Procedure: Right iliac bone marrow aspiration and biopsy    Performed By: Kathryn Roberts MD     Assistants: None    Anesthesia:Moderate Sedation    Estimated Blood Loss: Less than 10ml    Specimens:  Pathology    Implants:  None    Findings: Unremarkable. Complications: None    Recommendations: 1 hour bedrest.       Follow Up: Dr Rhea Myers.      Signed By: Kathryn Roberts MD     June 19, 2019

## 2019-06-19 NOTE — PROGRESS NOTES
Recovery period without difficulty. Pt alert and oriented and denies pain. Dressing is clean, dry, and intact. Reviewed discharge instructions with patient and spouse, both verbalized understanding. Pt escorted to lobby discharge area via wheelchair. Vital signs and Oscar score completed. IV remained in place; Pt arrived with IV in place for treatment tomorrow.

## 2019-06-19 NOTE — DISCHARGE INSTRUCTIONS
[address]  Department of Interventional Radiology  [facility]  [phone] Office  [phone] Fax    BIOPSY DISCHARGE INSTRUCTIONS    General Instructions:     A biopsy is the removal of a small piece of tissue for microscopic examination or testing. Healthy tissue can be obtained for the purpose of tissue-type matching for transplants. Unhealthy tissues are more commonly biopsied to diagnose disease. Home Care Instructions: You may resume your regular diet and medication regimen. Do not drink alcohol, drive, or make any important legal decisions in the next 24 hours. Do not lift anything heavier than a gallon of milk until the soreness goes away. You may use over the counter acetaminophen or ibuprofen for the soreness. You may apply an ice pack to the affected area for 20-30 minutes at time for the first 24 hours. After that, you may apply a heat pack. Call If: You should call your Physician and/or the Radiology Nurse if you have any questions or concerns about the biopsy site. Call if you should have increased pain, fever, redness, drainage, or bleeding more than a small spot on the bandage. Follow-Up Instructions: Please see your ordering doctor as he/she has requested. To Reach Us: If you have any questions about your procedure, please call the Interventional Radiology department at [phone]. After business hours (5pm) and weekends, call the answering service at [phone] and ask for the Radiologist on call to be paged.      Interventional Radiology General Nurse Discharge    After general anesthesia or intravenous sedation, for 24 hours or while taking prescription Narcotics:  · Limit your activities  · Do not drive and operate hazardous machinery  · Do not make important personal or business decisions  · Do  not drink alcoholic beverages  · If you have not urinated within 8 hours after discharge, please contact your surgeon on call. * Please give a list of your current medications to your Primary Care Provider. * Please update this list whenever your medications are discontinued, doses are     changed, or new medications (including over-the-counter products) are added. * Please carry medication information at all times in case of emergency situations. These are general instructions for a healthy lifestyle:    No smoking/ No tobacco products/ Avoid exposure to second hand smoke  Surgeon General's Warning:  Quitting smoking now greatly reduces serious risk to your health. Obesity, smoking, and sedentary lifestyle greatly increases your risk for illness  A healthy diet, regular physical exercise & weight monitoring are important for maintaining a healthy lifestyle    You may be retaining fluid if you have a history of heart failure or if you experience any of the following symptoms:  Weight gain of 3 pounds or more overnight or 5 pounds in a week, increased swelling in our hands or feet or shortness of breath while lying flat in bed. Please call your doctor as soon as you notice any of these symptoms; do not wait until your next office visit. Recognize signs and symptoms of STROKE:  F-face looks uneven    A-arms unable to move or move unevenly    S-speech slurred or non-existent    T-time-call 911 as soon as signs and symptoms begin-DO NOT go       Back to bed or wait to see if you get better-TIME IS BRAIN.             Patient Signature:  Date: 6/19/2019  Discharging Nurse: Trevor Fong RN

## 2019-06-19 NOTE — PROGRESS NOTES
Pt arrived ambulatory today at [phone], to receive labs and daptomycin, drawn from IV site. Pt tolerated without difficulty. No platelets needed. Patient discharged via ambulatory accompanied by spouse. Instructed to notify physician of any problems, questions or concerns. Allowed opportunity for patient/family to ask questions. Verbalized understanding. Next appointment is today at 36 with IR for bone marrow biopsy.

## 2019-06-19 NOTE — PROGRESS NOTES
TRANSFER - OUT REPORT:    Verbal report given to Rafael Hawley RN (name) on [facility]  being transferred to IR recovery (unit) for routine progression of care       Report consisted of patients Situation, Background, Assessment and   Recommendations(SBAR). Information from the following report(s) Procedure Summary and MAR was reviewed with the receiving nurse. Opportunity for questions and clarification was provided. Conscious Sedation:   75 Mcg of Fentanyl administered  1.5 Mg of Versed administered    Pt tolerated procedure well. Visit Vitals  /68   Pulse 72   Temp 99.2 °F (37.3 °C)   Resp 15   Ht 5' 6\" (1.676 m)   Wt 83.9 kg (185 lb)   SpO2 98%   Breastfeeding?  No   BMI 29.86 kg/m²     Past Medical History:   Diagnosis Date    Cancer ([facility] [address]     AML     Peripheral IV 06/19/19 Right Antecubital (Active)   Site Assessment Clean, dry, & intact 6/19/2019 11:42 AM   Phlebitis Assessment 0 6/19/2019 11:42 AM   Infiltration Assessment 0 6/19/2019 11:42 AM   Dressing Status Clean, dry, & intact 6/19/2019 11:42 AM   Dressing Type Tape;Transparent 6/19/2019 11:42 AM   Hub Color/Line Status Blue;Flushed;Patent 6/19/2019 11:42 AM

## 2019-06-19 NOTE — H&P
Department of Interventional Radiology  [phone]    History and Physical    Patient:  Camilla Walters MRN:  [id number]  SSN:  [id number]    YOB: 1945  Age:  68 y.o. Sex:  female      Primary Care Provider:  Kristin Castle MD  Referring Physician:  Susie Ibrahim MD    Subjective:     Chief Complaint: AML    History of the Present Illness: The patient is a 68 y.o. female who presents for bone marrow biopsy. AML. Leukopenia, chest port removed, receiving daptomycin at infusion. NPO since 6 AM.        Past Medical History:   Diagnosis Date    Cancer ([facility] [address]     AML     Past Surgical History:   Procedure Laterality Date    IR INSERT TUNL CVC W PORT OVER 5 YEARS  4/30/2019    IR REMOVE TUNL CVAD W PORT/PUMP  6/13/2019        Review of Systems:    Pertinent items are noted in the History of Present Illness. Current Outpatient Medications   Medication Sig    clindamycin (CLEOCIN) 300 mg capsule Take 2 Caps by mouth three (3) times daily for 10 days.  acyclovir (ZOVIRAX) 400 mg tablet Take 1 Tab by mouth two (2) times a day for 30 days.  allopurinol (ZYLOPRIM) 300 mg tablet Take 1 Tab by mouth daily for 30 days.  levoFLOXacin (LEVAQUIN) 500 mg tablet Take 1 Tab by mouth every twenty-four (24) hours for 30 days.  cholecalciferol (VITAMIN D3) 400 unit tab tablet Take 2 Tabs by mouth daily.  fluconazole (DIFLUCAN) 200 mg tablet Take 1 Tab by mouth daily for 30 days. FDA advises cautious prescribing of oral fluconazole in pregnancy.  chlorhexidine (PERIDEX) 0.12 % solution Take 15 mL by mouth two (2) times a day.  vit C/E/zinc/lutein/zeaxanthin (OCUVITE EYE HEALTH PO) Take 1 Tab by mouth daily.  escitalopram oxalate (LEXAPRO) 10 mg tablet Take 10 mg by mouth daily.  LORazepam (ATIVAN) 1 mg tablet Take  by mouth nightly.  acetaminophen 500 mg tab 500 mg, diphenhydrAMINE 25 mg cap 25 mg Take  by mouth nightly.     pravastatin (PRAVACHOL) 10 mg tablet Take by mouth nightly.  lidocaine-prilocaine (EMLA) topical cream Apply  to affected area as needed for Pain. Apply to port site 45-60 minutes prior to lab appt or infusion    ondansetron hcl (ZOFRAN) 8 mg tablet Take 1 Tab by mouth every eight (8) hours as needed for Nausea. Current Facility-Administered Medications   Medication Dose Route Frequency    0.9% sodium chloride infusion  25 mL/hr IntraVENous ONCE    diphenhydrAMINE (BENADRYL) injection 12.5-50 mg  12.5-50 mg IntraVENous ONCE    fentaNYL citrate (PF) injection  mcg   mcg IntraVENous Multiple    midazolam (VERSED) injection 0.25-2 mg  0.25-2 mg IntraVENous Multiple    lidocaine (XYLOCAINE) 20 mg/mL (2 %) injection  mg   mg SubCUTAneous Rad Multiple     Facility-Administered Medications Ordered in Other Encounters   Medication Dose Route Frequency    central line flush (saline) syringe 10 mL  10 mL InterCATHeter PRN        No Known Allergies    No family history on file. Social History     Tobacco Use    Smoking status: Never Smoker    Smokeless tobacco: Never Used   Substance Use Topics    Alcohol use: Never     Frequency: Never        Objective:       Physical Examination:    Vitals:    06/17/19 1352 06/19/19 1137   BP:  159/68   Pulse:  71   Resp:  16   Temp:  99.2 °F (37.3 °C)   SpO2:  98%   Weight: 83.9 kg (185 lb)    Height: 5' 6\" (1.676 m)      Blood pressure 159/68, pulse 71, temperature 99.2 °F (37.3 °C), resp. rate 16, height 5' 6\" (1.676 m), weight 83.9 kg (185 lb), SpO2 98 %, not currently breastfeeding.    Right chest wound dry and intact  HEART: regular rate and rhythm  LUNG: clear to auscultation bilaterally  ABDOMEN: normal findings: soft, non-tender  EXTREMITIES: normal strength, tone, and muscle mass    Laboratory:     Lab Results   Component Value Date/Time    Sodium 140 06/17/2019 10:41 AM    Sodium 143 06/15/2019 12:36 AM    Potassium 3.3 (L) 06/17/2019 10:41 AM    Potassium 3.3 (L) 06/15/2019 12:36 AM    Chloride 107 06/17/2019 10:41 AM    Chloride 110 (H) 06/15/2019 12:36 AM    CO2 27 06/17/2019 10:41 AM    CO2 26 06/15/2019 12:36 AM    Anion gap 6 (L) 06/17/2019 10:41 AM    Anion gap 7 06/15/2019 12:36 AM    Glucose 100 06/17/2019 10:41 AM    Glucose 102 (H) 06/15/2019 12:36 AM    BUN 8 06/17/2019 10:41 AM    BUN 8 06/15/2019 12:36 AM    Creatinine 0.64 06/17/2019 10:41 AM    Creatinine 0.52 (L) 06/15/2019 12:36 AM    GFR est AA >60 06/17/2019 10:41 AM    GFR est AA >60 06/15/2019 12:36 AM    GFR est non-AA >60 06/17/2019 10:41 AM    GFR est non-AA >60 06/15/2019 12:36 AM    Calcium 9.1 06/17/2019 10:41 AM    Calcium 8.3 06/15/2019 12:36 AM    Magnesium 2.3 06/17/2019 01:26 PM    Magnesium 2.1 06/12/2019 09:48 AM    Phosphorus 2.2 (L) 06/12/2019 09:48 AM    Phosphorus 2.9 06/10/2019 08:05 AM    Albumin 3.8 06/17/2019 10:41 AM    Albumin 3.2 06/15/2019 12:36 AM    Protein, total 7.4 06/17/2019 10:41 AM    Protein, total 6.2 (L) 06/15/2019 12:36 AM    Globulin 3.6 (H) 06/17/2019 10:41 AM    Globulin 3.0 06/15/2019 12:36 AM    A-G Ratio 1.1 (L) 06/17/2019 10:41 AM    A-G Ratio 1.1 (L) 06/15/2019 12:36 AM    AST (SGOT) 16 06/17/2019 10:41 AM    AST (SGOT) 9 (L) 06/15/2019 12:36 AM    ALT (SGPT) 20 06/17/2019 10:41 AM    ALT (SGPT) 15 06/15/2019 12:36 AM     Lab Results   Component Value Date/Time    WBC 0.6 (LL) 06/19/2019 11:40 AM    WBC 0.6 (LL) 06/19/2019 08:54 AM    HGB 8.3 (L) 06/19/2019 11:40 AM    HGB 8.4 (L) 06/19/2019 08:54 AM    HCT 24.8 (L) 06/19/2019 11:40 AM    HCT 24.7 (L) 06/19/2019 08:54 AM    PLATELET 47 (L) 26/81/7224 11:40 AM    PLATELET 51 (L) 17/71/8886 08:54 AM     Lab Results   Component Value Date/Time    aPTT 33.4 04/30/2019 02:58 PM    Prothrombin time 17.6 (H) 04/30/2019 02:58 PM    INR 1.5 04/30/2019 02:58 PM       Assessment:     AML    Plan:     Planned Procedure:  Bone marrow biopsy    Risks, benefits, and alternatives reviewed with patient and she agrees to proceed with the procedure.       Signed By: Mark Jaramillo PA-C     June 19, 2019

## 2019-06-20 ENCOUNTER — HOSPITAL ENCOUNTER (OUTPATIENT)
Dept: NON INVASIVE DIAGNOSTICS | Age: 74
Discharge: HOME | End: 2019-06-20
Attending: INTERNAL MEDICINE
Payer: MEDICARE

## 2019-06-20 ENCOUNTER — HOSPITAL ENCOUNTER (OUTPATIENT)
Dept: INFUSION THERAPY | Age: 74
Discharge: HOME | End: 2019-06-20
Payer: MEDICARE

## 2019-06-20 VITALS
RESPIRATION RATE: 18 BRPM | HEART RATE: 85 BPM | TEMPERATURE: 98.9 F | OXYGEN SATURATION: 97 % | DIASTOLIC BLOOD PRESSURE: 67 MMHG | SYSTOLIC BLOOD PRESSURE: 141 MMHG

## 2019-06-20 DIAGNOSIS — C92.00 ACUTE MYELOID LEUKEMIA NOT HAVING ACHIEVED REMISSION (HCC): Primary | ICD-10-CM

## 2019-06-20 DIAGNOSIS — C92.00 ACUTE MYELOID LEUKEMIA NOT HAVING ACHIEVED REMISSION (HCC): ICD-10-CM

## 2019-06-20 DIAGNOSIS — R78.81 BACTEREMIA: ICD-10-CM

## 2019-06-20 PROCEDURE — 96374 THER/PROPH/DIAG INJ IV PUSH: CPT

## 2019-06-20 PROCEDURE — 74011000250 HC RX REV CODE- 250: Performed by: INTERNAL MEDICINE

## 2019-06-20 PROCEDURE — 74011250636 HC RX REV CODE- 250/636: Performed by: INTERNAL MEDICINE

## 2019-06-20 PROCEDURE — 93306 TTE W/DOPPLER COMPLETE: CPT

## 2019-06-20 RX ORDER — SODIUM CHLORIDE 0.9 % (FLUSH) 0.9 %
10 SYRINGE (ML) INJECTION AS NEEDED
Status: DISCONTINUED | OUTPATIENT
Start: 2019-06-20 | End: 2019-06-24 | Stop reason: HOSPADM

## 2019-06-20 RX ADMIN — Medication 10 ML: at 15:57

## 2019-06-20 RX ADMIN — Medication 10 ML: at 15:53

## 2019-06-20 RX ADMIN — DAPTOMYCIN 500 MG: 500 INJECTION, POWDER, LYOPHILIZED, FOR SOLUTION INTRAVENOUS at 15:54

## 2019-06-20 NOTE — PROGRESS NOTES
Arrived to the [facility]. Daptomycin infusion completed. Patient tolerated well. Any issues or concerns during appointment: none. Patient aware of next infusion appointment on 06.21.2019 (date) at 65 (time). Discharged ambulatory to home with spouse.

## 2019-06-21 ENCOUNTER — HOSPITAL ENCOUNTER (OUTPATIENT)
Dept: INFUSION THERAPY | Age: 74
Discharge: HOME | End: 2019-06-21
Payer: MEDICARE

## 2019-06-21 VITALS
TEMPERATURE: 99.3 F | BODY MASS INDEX: 29.89 KG/M2 | RESPIRATION RATE: 18 BRPM | WEIGHT: 185.2 LBS | SYSTOLIC BLOOD PRESSURE: 117 MMHG | OXYGEN SATURATION: 99 % | DIASTOLIC BLOOD PRESSURE: 81 MMHG | HEART RATE: 89 BPM

## 2019-06-21 DIAGNOSIS — C92.00 ACUTE MYELOID LEUKEMIA NOT HAVING ACHIEVED REMISSION (HCC): Primary | ICD-10-CM

## 2019-06-21 PROCEDURE — 96374 THER/PROPH/DIAG INJ IV PUSH: CPT

## 2019-06-21 PROCEDURE — 74011000250 HC RX REV CODE- 250: Performed by: INTERNAL MEDICINE

## 2019-06-21 PROCEDURE — 74011250636 HC RX REV CODE- 250/636: Performed by: INTERNAL MEDICINE

## 2019-06-21 RX ORDER — SODIUM CHLORIDE 0.9 % (FLUSH) 0.9 %
10-30 SYRINGE (ML) INJECTION AS NEEDED
Status: DISCONTINUED | OUTPATIENT
Start: 2019-06-21 | End: 2019-06-25 | Stop reason: HOSPADM

## 2019-06-21 RX ADMIN — Medication 10 ML: at 17:29

## 2019-06-21 RX ADMIN — Medication 10 ML: at 17:02

## 2019-06-21 RX ADMIN — DAPTOMYCIN 500 MG: 500 INJECTION, POWDER, LYOPHILIZED, FOR SOLUTION INTRAVENOUS at 17:27

## 2019-06-21 NOTE — PROGRESS NOTES
Arrived to the [facility]. Daptomycin infusion completed. Patient tolerated well. Any issues or concerns during appointment: None. IV site to right [facility] noted to be C/D/I. IV flushed and left accessed, per pt request, for infusion appointment tomorrow. Patient aware of next infusion appointment on 06/22/2019 (date) at 56 (time). Discharged ambulatory to home with spouse.

## 2019-06-22 ENCOUNTER — HOSPITAL ENCOUNTER (OUTPATIENT)
Dept: INFUSION THERAPY | Age: 74
Discharge: HOME | End: 2019-06-22
Payer: MEDICARE

## 2019-06-22 VITALS
RESPIRATION RATE: 18 BRPM | HEART RATE: 82 BPM | SYSTOLIC BLOOD PRESSURE: 140 MMHG | TEMPERATURE: 98.1 F | OXYGEN SATURATION: 99 % | DIASTOLIC BLOOD PRESSURE: 58 MMHG

## 2019-06-22 DIAGNOSIS — C92.00 ACUTE MYELOID LEUKEMIA NOT HAVING ACHIEVED REMISSION (HCC): Primary | ICD-10-CM

## 2019-06-22 LAB
BACTERIA SPEC CULT: NORMAL
SERVICE CMNT-IMP: NORMAL

## 2019-06-22 PROCEDURE — 74011250636 HC RX REV CODE- 250/636: Performed by: INTERNAL MEDICINE

## 2019-06-22 PROCEDURE — 74011000250 HC RX REV CODE- 250: Performed by: INTERNAL MEDICINE

## 2019-06-22 PROCEDURE — 96374 THER/PROPH/DIAG INJ IV PUSH: CPT

## 2019-06-22 RX ORDER — SODIUM CHLORIDE 0.9 % (FLUSH) 0.9 %
5-10 SYRINGE (ML) INJECTION AS NEEDED
Status: DISCONTINUED | OUTPATIENT
Start: 2019-06-22 | End: 2019-06-26 | Stop reason: HOSPADM

## 2019-06-22 RX ADMIN — DAPTOMYCIN 500 MG: 500 INJECTION, POWDER, LYOPHILIZED, FOR SOLUTION INTRAVENOUS at 10:50

## 2019-06-22 RX ADMIN — Medication 10 ML: at 10:09

## 2019-06-22 RX ADMIN — Medication 10 ML: at 10:53

## 2019-06-22 NOTE — PROGRESS NOTES
Pt arrived ambulatory to [facility] with IV previously inserted. Flushes well. Dapto given IV push. IV flushed and remains accessed for tomorrows appt at 1000. Discharged ambulatory.

## 2019-06-23 ENCOUNTER — HOSPITAL ENCOUNTER (OUTPATIENT)
Dept: INFUSION THERAPY | Age: 74
Discharge: HOME | End: 2019-06-23
Payer: MEDICARE

## 2019-06-23 VITALS
WEIGHT: 184.8 LBS | DIASTOLIC BLOOD PRESSURE: 67 MMHG | BODY MASS INDEX: 29.83 KG/M2 | HEART RATE: 72 BPM | RESPIRATION RATE: 18 BRPM | OXYGEN SATURATION: 97 % | SYSTOLIC BLOOD PRESSURE: 137 MMHG | TEMPERATURE: 98 F

## 2019-06-23 DIAGNOSIS — C92.00 ACUTE MYELOID LEUKEMIA NOT HAVING ACHIEVED REMISSION (HCC): ICD-10-CM

## 2019-06-23 PROCEDURE — 74011250636 HC RX REV CODE- 250/636: Performed by: INTERNAL MEDICINE

## 2019-06-23 PROCEDURE — 96374 THER/PROPH/DIAG INJ IV PUSH: CPT

## 2019-06-23 PROCEDURE — 74011000250 HC RX REV CODE- 250: Performed by: INTERNAL MEDICINE

## 2019-06-23 RX ORDER — SODIUM CHLORIDE 0.9 % (FLUSH) 0.9 %
10 SYRINGE (ML) INJECTION AS NEEDED
Status: CANCELLED | OUTPATIENT
Start: 2019-06-23 | End: 2019-06-23

## 2019-06-23 RX ORDER — SODIUM CHLORIDE 0.9 % (FLUSH) 0.9 %
10 SYRINGE (ML) INJECTION AS NEEDED
Status: COMPLETED | OUTPATIENT
Start: 2019-06-23 | End: 2019-06-23

## 2019-06-23 RX ADMIN — Medication 10 ML: at 11:17

## 2019-06-23 RX ADMIN — DAPTOMYCIN 500 MG: 500 INJECTION, POWDER, LYOPHILIZED, FOR SOLUTION INTRAVENOUS at 11:18

## 2019-06-23 RX ADMIN — Medication 10 ML: at 11:25

## 2019-06-23 NOTE — PROGRESS NOTES
Arrived to the [facility]. Antibiotic completed. Patient tolerated well. Any issues or concerns during appointment: None. Patient aware of next infusion appointment on 6/24 (date) at 0930 (time). Discharged ambulatory in stable condition accompanied by .

## 2019-06-24 ENCOUNTER — HOSPITAL ENCOUNTER (OUTPATIENT)
Dept: INFUSION THERAPY | Age: 74
Discharge: HOME | End: 2019-06-24
Payer: MEDICARE

## 2019-06-24 VITALS
WEIGHT: 183.2 LBS | BODY MASS INDEX: 29.57 KG/M2 | SYSTOLIC BLOOD PRESSURE: 124 MMHG | HEART RATE: 78 BPM | OXYGEN SATURATION: 97 % | RESPIRATION RATE: 18 BRPM | TEMPERATURE: 99 F | DIASTOLIC BLOOD PRESSURE: 63 MMHG

## 2019-06-24 DIAGNOSIS — T80.212A PORT OR RESERVOIR INFECTION, INITIAL ENCOUNTER: ICD-10-CM

## 2019-06-24 DIAGNOSIS — C92.00 ACUTE MYELOID LEUKEMIA NOT HAVING ACHIEVED REMISSION (HCC): Primary | ICD-10-CM

## 2019-06-24 LAB
ALBUMIN SERPL-MCNC: 3.9 G/DL (ref 3.2–4.6)
ALBUMIN/GLOB SERPL: 1.2 {RATIO} (ref 1.2–3.5)
ALP SERPL-CCNC: 109 U/L (ref 50–136)
ALT SERPL-CCNC: 20 U/L (ref 12–65)
ANION GAP SERPL CALC-SCNC: 8 MMOL/L (ref 7–16)
AST SERPL-CCNC: 14 U/L (ref 15–37)
BASOPHILS # BLD: 0 K/UL (ref 0–0.2)
BASOPHILS NFR BLD: 0 % (ref 0–2)
BILIRUB SERPL-MCNC: 0.4 MG/DL (ref 0.2–1.1)
BUN SERPL-MCNC: 11 MG/DL (ref 8–23)
CALCIUM SERPL-MCNC: 9.3 MG/DL (ref 8.3–10.4)
CHLORIDE SERPL-SCNC: 108 MMOL/L (ref 98–107)
CO2 SERPL-SCNC: 24 MMOL/L (ref 21–32)
CREAT SERPL-MCNC: 0.72 MG/DL (ref 0.6–1)
DIFFERENTIAL METHOD BLD: ABNORMAL
EOSINOPHIL # BLD: 0 K/UL (ref 0–0.8)
EOSINOPHIL NFR BLD: 0 % (ref 0.5–7.8)
ERYTHROCYTE [DISTWIDTH] IN BLOOD BY AUTOMATED COUNT: 18 % (ref 11.9–14.6)
GLOBULIN SER CALC-MCNC: 3.3 G/DL (ref 2.3–3.5)
GLUCOSE SERPL-MCNC: 134 MG/DL (ref 65–100)
HCT VFR BLD AUTO: 27.3 % (ref 35.8–46.3)
HGB BLD-MCNC: 9 G/DL (ref 11.7–15.4)
IMM GRANULOCYTES # BLD AUTO: 0 K/UL (ref 0–0.5)
IMM GRANULOCYTES NFR BLD AUTO: 1 % (ref 0–5)
LYMPHOCYTES # BLD: 0.3 K/UL (ref 0.5–4.6)
LYMPHOCYTES NFR BLD: 27 % (ref 13–44)
MCH RBC QN AUTO: 30.1 PG (ref 26.1–32.9)
MCHC RBC AUTO-ENTMCNC: 33 G/DL (ref 31.4–35)
MCV RBC AUTO: 91.3 FL (ref 79.6–97.8)
MONOCYTES # BLD: 0.3 K/UL (ref 0.1–1.3)
MONOCYTES NFR BLD: 27 % (ref 4–12)
NEUTS SEG # BLD: 0.4 K/UL (ref 1.7–8.2)
NEUTS SEG NFR BLD: 45 % (ref 43–78)
NRBC # BLD: 0 K/UL (ref 0–0.2)
PLATELET # BLD AUTO: 53 K/UL (ref 150–450)
PMV BLD AUTO: 12.1 FL (ref 9.4–12.3)
POTASSIUM SERPL-SCNC: 3.7 MMOL/L (ref 3.5–5.1)
PROT SERPL-MCNC: 7.2 G/DL (ref 6.3–8.2)
RBC # BLD AUTO: 2.99 M/UL (ref 4.05–5.25)
SODIUM SERPL-SCNC: 140 MMOL/L (ref 136–145)
WBC # BLD AUTO: 0.9 K/UL (ref 4.3–11.1)

## 2019-06-24 PROCEDURE — 96374 THER/PROPH/DIAG INJ IV PUSH: CPT

## 2019-06-24 PROCEDURE — 80053 COMPREHEN METABOLIC PANEL: CPT

## 2019-06-24 PROCEDURE — 85025 COMPLETE CBC W/AUTO DIFF WBC: CPT

## 2019-06-24 PROCEDURE — 74011250636 HC RX REV CODE- 250/636: Performed by: INTERNAL MEDICINE

## 2019-06-24 PROCEDURE — 74011000250 HC RX REV CODE- 250: Performed by: INTERNAL MEDICINE

## 2019-06-24 RX ORDER — SODIUM CHLORIDE 0.9 % (FLUSH) 0.9 %
10 SYRINGE (ML) INJECTION AS NEEDED
Status: ACTIVE | OUTPATIENT
Start: 2019-06-24 | End: 2019-06-24

## 2019-06-24 RX ADMIN — Medication 10 ML: at 10:15

## 2019-06-24 RX ADMIN — Medication 10 ML: at 10:20

## 2019-06-24 RX ADMIN — DAPTOMYCIN 500 MG: 500 INJECTION, POWDER, LYOPHILIZED, FOR SOLUTION INTRAVENOUS at 10:16

## 2019-06-24 NOTE — PROGRESS NOTES
Pt arrived ambulatory today at [phone], to receive labs and daptomycin, drawn from IV site. Pt tolerated without difficulty. No platelets needed. Patient discharged via ambulatory accompanied by spouse. Instructed to notify physician of any problems, questions or concerns. Allowed opportunity for patient/family to ask questions. Verbalized understanding. Next appointment is July 5 at 1330 with IR for bone marrow biopsy.

## 2019-07-01 ENCOUNTER — HOSPITAL ENCOUNTER (OUTPATIENT)
Dept: LAB | Age: 74
Discharge: HOME | End: 2019-07-01
Payer: MEDICARE

## 2019-07-01 ENCOUNTER — HOSPITAL ENCOUNTER (OUTPATIENT)
Dept: INFUSION THERAPY | Age: 74
Discharge: HOME | End: 2019-07-01
Payer: MEDICARE

## 2019-07-01 ENCOUNTER — PATIENT OUTREACH (OUTPATIENT)
Dept: CASE MANAGEMENT | Age: 74
End: 2019-07-01

## 2019-07-01 DIAGNOSIS — C92.00 ACUTE MYELOID LEUKEMIA NOT HAVING ACHIEVED REMISSION (HCC): ICD-10-CM

## 2019-07-01 DIAGNOSIS — C92.00 ACUTE MYELOID LEUKEMIA NOT HAVING ACHIEVED REMISSION (HCC): Primary | ICD-10-CM

## 2019-07-01 LAB
ALBUMIN SERPL-MCNC: 4.2 G/DL (ref 3.2–4.6)
ALBUMIN/GLOB SERPL: 1.3 {RATIO} (ref 1.2–3.5)
ALP SERPL-CCNC: 111 U/L (ref 50–136)
ALT SERPL-CCNC: 22 U/L (ref 12–65)
ANION GAP SERPL CALC-SCNC: 6 MMOL/L (ref 7–16)
AST SERPL-CCNC: 19 U/L (ref 15–37)
BASOPHILS # BLD: 0 K/UL (ref 0–0.2)
BASOPHILS NFR BLD: 0 % (ref 0–2)
BILIRUB SERPL-MCNC: 0.4 MG/DL (ref 0.2–1.1)
BUN SERPL-MCNC: 14 MG/DL (ref 8–23)
CALCIUM SERPL-MCNC: 9.3 MG/DL (ref 8.3–10.4)
CHLORIDE SERPL-SCNC: 105 MMOL/L (ref 98–107)
CO2 SERPL-SCNC: 27 MMOL/L (ref 21–32)
CREAT SERPL-MCNC: 0.85 MG/DL (ref 0.6–1)
DIFFERENTIAL METHOD BLD: ABNORMAL
EOSINOPHIL # BLD: 0 K/UL (ref 0–0.8)
EOSINOPHIL NFR BLD: 0 % (ref 0.5–7.8)
ERYTHROCYTE [DISTWIDTH] IN BLOOD BY AUTOMATED COUNT: 22.1 % (ref 11.9–14.6)
GLOBULIN SER CALC-MCNC: 3.2 G/DL (ref 2.3–3.5)
GLUCOSE SERPL-MCNC: 97 MG/DL (ref 65–100)
HCT VFR BLD AUTO: 29.9 % (ref 35.8–46.3)
HGB BLD-MCNC: 10 G/DL (ref 11.7–15.4)
IMM GRANULOCYTES # BLD AUTO: 0 K/UL (ref 0–0.5)
IMM GRANULOCYTES NFR BLD AUTO: 1 % (ref 0–5)
LYMPHOCYTES # BLD: 0.4 K/UL (ref 0.5–4.6)
LYMPHOCYTES NFR BLD: 21 % (ref 13–44)
MAGNESIUM SERPL-MCNC: 2.2 MG/DL (ref 1.8–2.4)
MCH RBC QN AUTO: 31.8 PG (ref 26.1–32.9)
MCHC RBC AUTO-ENTMCNC: 33.4 G/DL (ref 31.4–35)
MCV RBC AUTO: 95.2 FL (ref 79.6–97.8)
MONOCYTES # BLD: 0.5 K/UL (ref 0.1–1.3)
MONOCYTES NFR BLD: 29 % (ref 4–12)
NEUTS SEG # BLD: 0.9 K/UL (ref 1.7–8.2)
NEUTS SEG NFR BLD: 49 % (ref 43–78)
NRBC # BLD: 0.01 K/UL (ref 0–0.2)
PLATELET # BLD AUTO: 63 K/UL (ref 150–450)
PMV BLD AUTO: 11 FL (ref 9.4–12.3)
POTASSIUM SERPL-SCNC: 4.2 MMOL/L (ref 3.5–5.1)
PROT SERPL-MCNC: 7.4 G/DL (ref 6.3–8.2)
RBC # BLD AUTO: 3.14 M/UL (ref 4.05–5.25)
SODIUM SERPL-SCNC: 138 MMOL/L (ref 136–145)
WBC # BLD AUTO: 1.8 K/UL (ref 4.3–11.1)

## 2019-07-01 PROCEDURE — 85025 COMPLETE CBC W/AUTO DIFF WBC: CPT

## 2019-07-01 PROCEDURE — 36415 COLL VENOUS BLD VENIPUNCTURE: CPT

## 2019-07-01 PROCEDURE — 83735 ASSAY OF MAGNESIUM: CPT

## 2019-07-01 PROCEDURE — 80053 COMPREHEN METABOLIC PANEL: CPT

## 2019-07-01 NOTE — PROGRESS NOTES
7/1/19:  Patient in for follow-up with NP. Patient feeling well and has no complaints. Counts recovering. NP, Cailin Whaley, reviewed labs and recent bone marrow results and Dr. Sergio Sierra recommendations for new treatment. Patient to return for chemo ed and financial counseling. She will come back for baseline EKG prior to starting xospata and dacogen. Patient to see Dr. Sergio Sierra on 7/9.

## 2019-07-03 LAB
FLOW CYTOMETRY, FBTC1: NORMAL
SPECIMEN SOURCE: NORMAL
TEST ORDERED:: NORMAL

## 2019-07-08 ENCOUNTER — DOCUMENTATION ONLY (OUTPATIENT)
Dept: HEMATOLOGY | Age: 74
End: 2019-07-08

## 2019-07-08 NOTE — PROGRESS NOTES
I spoke with Mrs. Karissa Gauthier regarding her insurance coverage, potential oral medication authorizations, enrollment in the [address] [place], [place] [address]able [employer] ([employer]) and the [employer] ([address] [place] [address]), and assistance organization resource sheet. Mrs. Karissa Gauthier has Andres Pacheco and Mar for [employer] for her major medical costs and  prescription drug coverage. Medicare and  for Life pay 100% of Medicare approved charges. Regarding the [address] prescription, I let her know that Luisito Dai is handling the prescription as [employer] does not have access to the medication. Mrs. Karissa Gauthier let me know that [employer] had called her to let her know. I went over the filling process with Mrs. Yu regarding the specialty medications. If there were any problems, to contact Dr. Laura Arreola' , Brenda Denny. I gave Mrs. Karissa Gauthier the [facility] participation letter. I let her know that the average patient responsibility for 6 months of treatment for type cancer is $8,530 after Medicare pays. Next, I spoke with Mrs. Yu regarding potential oral medication authorizations. I told her that if she ever had any problems getting her oral medications filled to give the dedicated [facility] , Brenda Denny, a call. Most of the time, it is simply an authorization that needs to be done with the insurance company. Next, I spoke with Mrs. Karissa Gauthier regarding enrolling with [facility] and [facility]. I went over some of the services that ACS and [facility] offers and the enrollment process. Next, I gave Mrs. [employer] about the free Yoga classes for cancer survivors and the Oncology Massage program.  I let her know that she can get a free 30 minute massage. Lastly, I gave Mrs. Karissa Gauthier a form with cathy Pacheco that could assist with specific needs (example:  transportation, lodging, preparing meals, home cleaning)    Faxed Patient Referral form to the [address] at [phone]. Phone [phone]. Form scanned into chart. Faxed Doctors Form to the [employer] at [phone]. Phone [phone]. Form scanned into chart. Patient expressed understanding of the information above and all questions were answered to her satisfaction.

## 2019-07-09 ENCOUNTER — APPOINTMENT (OUTPATIENT)
Dept: INFUSION THERAPY | Age: 74
End: 2019-07-09
Payer: MEDICARE

## 2019-07-09 ENCOUNTER — HOSPITAL ENCOUNTER (OUTPATIENT)
Dept: LAB | Age: 74
Discharge: HOME | End: 2019-07-09
Payer: MEDICARE

## 2019-07-09 DIAGNOSIS — C92.00 ACUTE MYELOID LEUKEMIA NOT HAVING ACHIEVED REMISSION (HCC): ICD-10-CM

## 2019-07-09 LAB
ALBUMIN SERPL-MCNC: 4.1 G/DL (ref 3.2–4.6)
ALBUMIN/GLOB SERPL: 1.4 {RATIO} (ref 1.2–3.5)
ALP SERPL-CCNC: 103 U/L (ref 50–136)
ALT SERPL-CCNC: 25 U/L (ref 12–65)
ANION GAP SERPL CALC-SCNC: 6 MMOL/L (ref 7–16)
AST SERPL-CCNC: 17 U/L (ref 15–37)
BASOPHILS # BLD: 0 K/UL (ref 0–0.2)
BASOPHILS NFR BLD: 0 % (ref 0–2)
BILIRUB SERPL-MCNC: 0.3 MG/DL (ref 0.2–1.1)
BUN SERPL-MCNC: 16 MG/DL (ref 8–23)
CALCIUM SERPL-MCNC: 9.1 MG/DL (ref 8.3–10.4)
CHLORIDE SERPL-SCNC: 105 MMOL/L (ref 98–107)
CO2 SERPL-SCNC: 29 MMOL/L (ref 21–32)
CREAT SERPL-MCNC: 0.87 MG/DL (ref 0.6–1)
DIFFERENTIAL METHOD BLD: ABNORMAL
EOSINOPHIL # BLD: 0 K/UL (ref 0–0.8)
EOSINOPHIL NFR BLD: 0 % (ref 0.5–7.8)
ERYTHROCYTE [DISTWIDTH] IN BLOOD BY AUTOMATED COUNT: 23.6 % (ref 11.9–14.6)
GLOBULIN SER CALC-MCNC: 3 G/DL (ref 2.3–3.5)
GLUCOSE SERPL-MCNC: 102 MG/DL (ref 65–100)
HCT VFR BLD AUTO: 29.7 % (ref 35.8–46.3)
HGB BLD-MCNC: 10 G/DL (ref 11.7–15.4)
IMM GRANULOCYTES # BLD AUTO: 0 K/UL (ref 0–0.5)
IMM GRANULOCYTES NFR BLD AUTO: 1 % (ref 0–5)
LYMPHOCYTES # BLD: 0.4 K/UL (ref 0.5–4.6)
LYMPHOCYTES NFR BLD: 21 % (ref 13–44)
MAGNESIUM SERPL-MCNC: 2.3 MG/DL (ref 1.8–2.4)
MCH RBC QN AUTO: 33.1 PG (ref 26.1–32.9)
MCHC RBC AUTO-ENTMCNC: 33.7 G/DL (ref 31.4–35)
MCV RBC AUTO: 98.3 FL (ref 79.6–97.8)
MONOCYTES # BLD: 0.5 K/UL (ref 0.1–1.3)
MONOCYTES NFR BLD: 28 % (ref 4–12)
NEUTS SEG # BLD: 0.9 K/UL (ref 1.7–8.2)
NEUTS SEG NFR BLD: 50 % (ref 43–78)
NRBC # BLD: 0.02 K/UL (ref 0–0.2)
PHOSPHATE SERPL-MCNC: 3.9 MG/DL (ref 2.3–3.7)
PLATELET # BLD AUTO: 43 K/UL (ref 150–450)
PMV BLD AUTO: 10.7 FL (ref 9.4–12.3)
POTASSIUM SERPL-SCNC: 4.6 MMOL/L (ref 3.5–5.1)
PROT SERPL-MCNC: 7.1 G/DL (ref 6.3–8.2)
RBC # BLD AUTO: 3.02 M/UL (ref 4.05–5.25)
SODIUM SERPL-SCNC: 140 MMOL/L (ref 136–145)
WBC # BLD AUTO: 1.8 K/UL (ref 4.3–11.1)

## 2019-07-09 PROCEDURE — 84100 ASSAY OF PHOSPHORUS: CPT

## 2019-07-09 PROCEDURE — 83735 ASSAY OF MAGNESIUM: CPT

## 2019-07-09 PROCEDURE — 80053 COMPREHEN METABOLIC PANEL: CPT

## 2019-07-09 PROCEDURE — 85025 COMPLETE CBC W/AUTO DIFF WBC: CPT

## 2019-07-09 PROCEDURE — 36415 COLL VENOUS BLD VENIPUNCTURE: CPT

## 2019-07-10 ENCOUNTER — HOSPITAL ENCOUNTER (OUTPATIENT)
Dept: INFUSION THERAPY | Age: 74
Discharge: HOME | End: 2019-07-10
Payer: MEDICARE

## 2019-07-10 ENCOUNTER — PATIENT OUTREACH (OUTPATIENT)
Dept: CASE MANAGEMENT | Age: 74
End: 2019-07-10

## 2019-07-10 DIAGNOSIS — C92.00 ACUTE MYELOID LEUKEMIA NOT HAVING ACHIEVED REMISSION (HCC): Primary | ICD-10-CM

## 2019-07-10 PROCEDURE — 74011250636 HC RX REV CODE- 250/636: Performed by: INTERNAL MEDICINE

## 2019-07-10 PROCEDURE — 96375 TX/PRO/DX INJ NEW DRUG ADDON: CPT

## 2019-07-10 PROCEDURE — 96413 CHEMO IV INFUSION 1 HR: CPT

## 2019-07-10 PROCEDURE — 74011250636 HC RX REV CODE- 250/636

## 2019-07-10 PROCEDURE — 74011000258 HC RX REV CODE- 258: Performed by: INTERNAL MEDICINE

## 2019-07-10 RX ORDER — ONDANSETRON 2 MG/ML
8 INJECTION INTRAMUSCULAR; INTRAVENOUS ONCE
Status: COMPLETED | OUTPATIENT
Start: 2019-07-10 | End: 2019-07-10

## 2019-07-10 RX ORDER — SODIUM CHLORIDE 9 MG/ML
25 INJECTION, SOLUTION INTRAVENOUS CONTINUOUS
Status: DISCONTINUED | OUTPATIENT
Start: 2019-07-10 | End: 2019-07-11 | Stop reason: HOSPADM

## 2019-07-10 RX ORDER — SODIUM CHLORIDE 0.9 % (FLUSH) 0.9 %
10 SYRINGE (ML) INJECTION AS NEEDED
Status: DISCONTINUED | OUTPATIENT
Start: 2019-07-10 | End: 2019-07-11 | Stop reason: HOSPADM

## 2019-07-10 RX ORDER — DULOXETIN HYDROCHLORIDE 60 MG/1
60 CAPSULE, DELAYED RELEASE ORAL DAILY
Qty: 30 CAP | Refills: 3 | Status: SHIPPED | OUTPATIENT
Start: 2019-07-10 | End: 2019-08-05 | Stop reason: DRUGHIGH

## 2019-07-10 RX ADMIN — DECITABINE 39 MG: 50 INJECTION, POWDER, LYOPHILIZED, FOR SOLUTION INTRAVENOUS at 16:44

## 2019-07-10 RX ADMIN — ONDANSETRON 8 MG: 2 INJECTION INTRAMUSCULAR; INTRAVENOUS at 16:36

## 2019-07-10 RX ADMIN — Medication 10 ML: at 17:53

## 2019-07-10 RX ADMIN — SODIUM CHLORIDE 25 ML/HR: 900 INJECTION, SOLUTION INTRAVENOUS at 16:38

## 2019-07-10 NOTE — PROGRESS NOTES
Pt arrived ambulatory, D1C1 Dacogen completed, pt tolerated well, no adverse side effects, peripheral IV left in place per pt request, pt discharged home, aware of appt tomorrow at 4pm

## 2019-07-10 NOTE — PROGRESS NOTES
7/9/19:  Patient in for follow-up with Dr. Yany Jarquin. Patient doing well and counts recovering some. Dr. Yany Jarquin reviewed bone marrow biopsy results and discussed options for upcoming treatment. Plan is to start dacogen and xospata. Patient to start dacogen on 7/10/19. Patient will need port replaced in the near future since last port infected and removed. Patient awaiting xospata delivery. Dr. Ynay Jarquin also discussed possibility of an allo transplant and would like the patent to meet with transplant physician at [facility] to discuss options. Patient and  to discuss [facility] and allo option and let this navigator know their decision. Patient to have labs and replacements twice a week and weekly provider checks.

## 2019-07-10 NOTE — PROGRESS NOTES
7/10/19:  Discussed patient's list of medications and interactions with xospata with Dr. Bouchra Ghotra and pharmacy. Patient to continue diflucan with weekly EKGs once xospata started. Patient to switch from lexapro to cymbalta 60mg daily.

## 2019-07-11 ENCOUNTER — HOSPITAL ENCOUNTER (OUTPATIENT)
Dept: INFUSION THERAPY | Age: 74
Discharge: HOME | End: 2019-07-11
Payer: MEDICARE

## 2019-07-11 VITALS
WEIGHT: 185.8 LBS | HEART RATE: 81 BPM | SYSTOLIC BLOOD PRESSURE: 121 MMHG | RESPIRATION RATE: 18 BRPM | BODY MASS INDEX: 29.99 KG/M2 | TEMPERATURE: 99 F | DIASTOLIC BLOOD PRESSURE: 83 MMHG | OXYGEN SATURATION: 99 %

## 2019-07-11 DIAGNOSIS — C92.00 ACUTE MYELOID LEUKEMIA NOT HAVING ACHIEVED REMISSION (HCC): Primary | ICD-10-CM

## 2019-07-11 PROCEDURE — 74011250636 HC RX REV CODE- 250/636: Performed by: INTERNAL MEDICINE

## 2019-07-11 PROCEDURE — 96413 CHEMO IV INFUSION 1 HR: CPT

## 2019-07-11 PROCEDURE — 74011250636 HC RX REV CODE- 250/636

## 2019-07-11 PROCEDURE — 96375 TX/PRO/DX INJ NEW DRUG ADDON: CPT

## 2019-07-11 PROCEDURE — 74011000258 HC RX REV CODE- 258: Performed by: INTERNAL MEDICINE

## 2019-07-11 RX ORDER — SODIUM CHLORIDE 9 MG/ML
25 INJECTION, SOLUTION INTRAVENOUS CONTINUOUS
Status: DISCONTINUED | OUTPATIENT
Start: 2019-07-11 | End: 2019-07-12 | Stop reason: HOSPADM

## 2019-07-11 RX ORDER — SODIUM CHLORIDE 0.9 % (FLUSH) 0.9 %
10 SYRINGE (ML) INJECTION AS NEEDED
Status: DISCONTINUED | OUTPATIENT
Start: 2019-07-11 | End: 2019-07-12 | Stop reason: HOSPADM

## 2019-07-11 RX ORDER — ONDANSETRON 2 MG/ML
8 INJECTION INTRAMUSCULAR; INTRAVENOUS ONCE
Status: COMPLETED | OUTPATIENT
Start: 2019-07-11 | End: 2019-07-11

## 2019-07-11 RX ADMIN — SODIUM CHLORIDE 25 ML/HR: 900 INJECTION, SOLUTION INTRAVENOUS at 16:10

## 2019-07-11 RX ADMIN — Medication 10 ML: at 17:40

## 2019-07-11 RX ADMIN — DECITABINE 39 MG: 50 INJECTION, POWDER, LYOPHILIZED, FOR SOLUTION INTRAVENOUS at 16:35

## 2019-07-11 RX ADMIN — ONDANSETRON 8 MG: 2 INJECTION INTRAMUSCULAR; INTRAVENOUS at 16:09

## 2019-07-11 RX ADMIN — Medication 10 ML: at 16:10

## 2019-07-11 NOTE — PROGRESS NOTES
Arrived to the [facility]. Dacogen completed. Patient tolerated well. Any issues or concerns during appointment:  Patient arrived with PIV in place from yesterday which had +BR. However after infusing some IVF, patient c/o burning. PIV d/c'd and new PIV obtained in right arm with +BR. Patient aware of next infusion appointment on 7/12 (date) at *** (time). Discharged ***.

## 2019-07-11 NOTE — PROGRESS NOTES
Arrived to the [facility]. Dacogen completed. Patient tolerated well. Any issues or concerns during appointment: None. Patient aware of next infusion appointment on July 12th at 1600. Discharged ambulatory.

## 2019-07-12 ENCOUNTER — HOSPITAL ENCOUNTER (OUTPATIENT)
Dept: INFUSION THERAPY | Age: 74
Discharge: HOME | End: 2019-07-12
Payer: MEDICARE

## 2019-07-12 VITALS
HEART RATE: 76 BPM | TEMPERATURE: 99 F | WEIGHT: 185.6 LBS | OXYGEN SATURATION: 95 % | BODY MASS INDEX: 29.96 KG/M2 | SYSTOLIC BLOOD PRESSURE: 135 MMHG | RESPIRATION RATE: 18 BRPM | DIASTOLIC BLOOD PRESSURE: 59 MMHG

## 2019-07-12 DIAGNOSIS — C92.00 ACUTE MYELOID LEUKEMIA NOT HAVING ACHIEVED REMISSION (HCC): Primary | ICD-10-CM

## 2019-07-12 PROCEDURE — 96375 TX/PRO/DX INJ NEW DRUG ADDON: CPT

## 2019-07-12 PROCEDURE — 96413 CHEMO IV INFUSION 1 HR: CPT

## 2019-07-12 PROCEDURE — 74011250636 HC RX REV CODE- 250/636: Performed by: INTERNAL MEDICINE

## 2019-07-12 PROCEDURE — 74011000258 HC RX REV CODE- 258: Performed by: INTERNAL MEDICINE

## 2019-07-12 PROCEDURE — 74011250636 HC RX REV CODE- 250/636

## 2019-07-12 RX ORDER — ONDANSETRON 2 MG/ML
8 INJECTION INTRAMUSCULAR; INTRAVENOUS ONCE
Status: COMPLETED | OUTPATIENT
Start: 2019-07-12 | End: 2019-07-12

## 2019-07-12 RX ORDER — SODIUM CHLORIDE 9 MG/ML
25 INJECTION, SOLUTION INTRAVENOUS CONTINUOUS
Status: DISCONTINUED | OUTPATIENT
Start: 2019-07-12 | End: 2019-07-13 | Stop reason: HOSPADM

## 2019-07-12 RX ADMIN — DECITABINE 40 MG: 50 INJECTION, POWDER, LYOPHILIZED, FOR SOLUTION INTRAVENOUS at 16:56

## 2019-07-12 RX ADMIN — SODIUM CHLORIDE 25 ML/HR: 900 INJECTION, SOLUTION INTRAVENOUS at 16:32

## 2019-07-12 RX ADMIN — ONDANSETRON 8 MG: 2 INJECTION INTRAMUSCULAR; INTRAVENOUS at 16:34

## 2019-07-12 NOTE — PROGRESS NOTES
Arrived to the [facility]. Chemo completed. Patient tolerated well. PIV removed intact, site clear. Any issues or concerns during appointment: None. Patient aware of next infusion appointment on 7/13 (date) at 1400 (time). Discharged ambulatory in stable condition.

## 2019-07-13 ENCOUNTER — HOSPITAL ENCOUNTER (OUTPATIENT)
Dept: INFUSION THERAPY | Age: 74
Discharge: HOME | End: 2019-07-13
Payer: MEDICARE

## 2019-07-13 VITALS
SYSTOLIC BLOOD PRESSURE: 134 MMHG | RESPIRATION RATE: 18 BRPM | HEART RATE: 83 BPM | DIASTOLIC BLOOD PRESSURE: 73 MMHG | WEIGHT: 185.2 LBS | OXYGEN SATURATION: 98 % | TEMPERATURE: 98.6 F | BODY MASS INDEX: 29.89 KG/M2

## 2019-07-13 DIAGNOSIS — C92.00 ACUTE MYELOID LEUKEMIA NOT HAVING ACHIEVED REMISSION (HCC): Primary | ICD-10-CM

## 2019-07-13 PROCEDURE — 74011250636 HC RX REV CODE- 250/636: Performed by: INTERNAL MEDICINE

## 2019-07-13 PROCEDURE — 74011000258 HC RX REV CODE- 258: Performed by: INTERNAL MEDICINE

## 2019-07-13 PROCEDURE — 96413 CHEMO IV INFUSION 1 HR: CPT

## 2019-07-13 PROCEDURE — 96375 TX/PRO/DX INJ NEW DRUG ADDON: CPT

## 2019-07-13 RX ORDER — SODIUM CHLORIDE 9 MG/ML
25 INJECTION, SOLUTION INTRAVENOUS CONTINUOUS
Status: DISCONTINUED | OUTPATIENT
Start: 2019-07-13 | End: 2019-07-14 | Stop reason: HOSPADM

## 2019-07-13 RX ORDER — ONDANSETRON 2 MG/ML
8 INJECTION INTRAMUSCULAR; INTRAVENOUS ONCE
Status: COMPLETED | OUTPATIENT
Start: 2019-07-13 | End: 2019-07-13

## 2019-07-13 RX ADMIN — SODIUM CHLORIDE 25 ML/HR: 900 INJECTION, SOLUTION INTRAVENOUS at 14:24

## 2019-07-13 RX ADMIN — ONDANSETRON 8 MG: 2 INJECTION INTRAMUSCULAR; INTRAVENOUS at 14:32

## 2019-07-13 RX ADMIN — DECITABINE 40 MG: 50 INJECTION, POWDER, LYOPHILIZED, FOR SOLUTION INTRAVENOUS at 15:28

## 2019-07-13 NOTE — PROGRESS NOTES
Arrived to the [facility]. Chemo completed. Patient tolerated well. PIV removed intact, site clear. Any issues or concerns during appointment: None. Patient aware of next infusion appointment on 7/14 (date) at 1400 (time). Discharged ambulatory in stable condition.

## 2019-07-14 ENCOUNTER — ANESTHESIA EVENT (OUTPATIENT)
Dept: SURGERY | Age: 74
End: 2019-07-14
Payer: MEDICARE

## 2019-07-14 ENCOUNTER — HOSPITAL ENCOUNTER (OUTPATIENT)
Dept: INFUSION THERAPY | Age: 74
Discharge: HOME | End: 2019-07-14
Payer: MEDICARE

## 2019-07-14 VITALS
TEMPERATURE: 97.8 F | RESPIRATION RATE: 16 BRPM | BODY MASS INDEX: 30.18 KG/M2 | SYSTOLIC BLOOD PRESSURE: 127 MMHG | OXYGEN SATURATION: 99 % | DIASTOLIC BLOOD PRESSURE: 63 MMHG | HEART RATE: 76 BPM | WEIGHT: 187 LBS

## 2019-07-14 DIAGNOSIS — C92.00 ACUTE MYELOID LEUKEMIA NOT HAVING ACHIEVED REMISSION (HCC): Primary | ICD-10-CM

## 2019-07-14 LAB
ALBUMIN SERPL-MCNC: 3.6 G/DL (ref 3.2–4.6)
ALBUMIN/GLOB SERPL: 1.3 {RATIO} (ref 1.2–3.5)
ALP SERPL-CCNC: 87 U/L (ref 50–136)
ALT SERPL-CCNC: 26 U/L (ref 12–65)
ANION GAP SERPL CALC-SCNC: 5 MMOL/L (ref 7–16)
AST SERPL-CCNC: 25 U/L (ref 15–37)
BASOPHILS # BLD: 0 K/UL (ref 0–0.2)
BASOPHILS NFR BLD: 0 % (ref 0–2)
BILIRUB SERPL-MCNC: 0.3 MG/DL (ref 0.2–1.1)
BUN SERPL-MCNC: 17 MG/DL (ref 8–23)
CALCIUM SERPL-MCNC: 8.5 MG/DL (ref 8.3–10.4)
CHLORIDE SERPL-SCNC: 106 MMOL/L (ref 98–107)
CO2 SERPL-SCNC: 29 MMOL/L (ref 21–32)
CREAT SERPL-MCNC: 0.88 MG/DL (ref 0.6–1)
DIFFERENTIAL METHOD BLD: ABNORMAL
EOSINOPHIL # BLD: 0 K/UL (ref 0–0.8)
EOSINOPHIL NFR BLD: 0 % (ref 0.5–7.8)
ERYTHROCYTE [DISTWIDTH] IN BLOOD BY AUTOMATED COUNT: 22.5 % (ref 11.9–14.6)
GLOBULIN SER CALC-MCNC: 2.7 G/DL (ref 2.3–3.5)
GLUCOSE SERPL-MCNC: 114 MG/DL (ref 65–100)
HCT VFR BLD AUTO: 26.7 % (ref 35.8–46.3)
HGB BLD-MCNC: 9.1 G/DL (ref 11.7–15.4)
IMM GRANULOCYTES # BLD AUTO: 0 K/UL (ref 0–0.5)
IMM GRANULOCYTES NFR BLD AUTO: 1 % (ref 0–5)
LYMPHOCYTES # BLD: 0.4 K/UL (ref 0.5–4.6)
LYMPHOCYTES NFR BLD: 32 % (ref 13–44)
MCH RBC QN AUTO: 33.1 PG (ref 26.1–32.9)
MCHC RBC AUTO-ENTMCNC: 34.1 G/DL (ref 31.4–35)
MCV RBC AUTO: 97.1 FL (ref 79.6–97.8)
MONOCYTES # BLD: 0.3 K/UL (ref 0.1–1.3)
MONOCYTES NFR BLD: 20 % (ref 4–12)
NEUTS SEG # BLD: 0.6 K/UL (ref 1.7–8.2)
NEUTS SEG NFR BLD: 47 % (ref 43–78)
NRBC # BLD: 0 K/UL (ref 0–0.2)
PLATELET # BLD AUTO: 29 K/UL (ref 150–450)
PMV BLD AUTO: 13 FL (ref 9.4–12.3)
POTASSIUM SERPL-SCNC: 3.9 MMOL/L (ref 3.5–5.1)
PROT SERPL-MCNC: 6.3 G/DL (ref 6.3–8.2)
RBC # BLD AUTO: 2.75 M/UL (ref 4.05–5.2)
SODIUM SERPL-SCNC: 140 MMOL/L (ref 136–145)
WBC # BLD AUTO: 1.3 K/UL (ref 4.3–11.1)

## 2019-07-14 PROCEDURE — 85025 COMPLETE CBC W/AUTO DIFF WBC: CPT

## 2019-07-14 PROCEDURE — 96375 TX/PRO/DX INJ NEW DRUG ADDON: CPT

## 2019-07-14 PROCEDURE — 74011250637 HC RX REV CODE- 250/637: Performed by: INTERNAL MEDICINE

## 2019-07-14 PROCEDURE — 77030018667 ADMN ST IV BLD FENW -A

## 2019-07-14 PROCEDURE — P9037 PLATE PHERES LEUKOREDU IRRAD: HCPCS

## 2019-07-14 PROCEDURE — 86644 CMV ANTIBODY: CPT

## 2019-07-14 PROCEDURE — 74011000258 HC RX REV CODE- 258: Performed by: INTERNAL MEDICINE

## 2019-07-14 PROCEDURE — 80053 COMPREHEN METABOLIC PANEL: CPT

## 2019-07-14 PROCEDURE — 36430 TRANSFUSION BLD/BLD COMPNT: CPT

## 2019-07-14 PROCEDURE — 74011250636 HC RX REV CODE- 250/636: Performed by: INTERNAL MEDICINE

## 2019-07-14 PROCEDURE — 96413 CHEMO IV INFUSION 1 HR: CPT

## 2019-07-14 RX ORDER — ACETAMINOPHEN 325 MG/1
650 TABLET ORAL ONCE
Status: COMPLETED | OUTPATIENT
Start: 2019-07-14 | End: 2019-07-14

## 2019-07-14 RX ORDER — SODIUM CHLORIDE 0.9 % (FLUSH) 0.9 %
10 SYRINGE (ML) INJECTION AS NEEDED
Status: DISCONTINUED | OUTPATIENT
Start: 2019-07-14 | End: 2019-07-15 | Stop reason: HOSPADM

## 2019-07-14 RX ORDER — DIPHENHYDRAMINE HCL 25 MG
25 CAPSULE ORAL ONCE
Status: COMPLETED | OUTPATIENT
Start: 2019-07-14 | End: 2019-07-14

## 2019-07-14 RX ORDER — SODIUM CHLORIDE, SODIUM LACTATE, POTASSIUM CHLORIDE, CALCIUM CHLORIDE 600; 310; 30; 20 MG/100ML; MG/100ML; MG/100ML; MG/100ML
100 INJECTION, SOLUTION INTRAVENOUS CONTINUOUS
Status: CANCELLED | OUTPATIENT
Start: 2019-07-14

## 2019-07-14 RX ORDER — SODIUM CHLORIDE 0.9 % (FLUSH) 0.9 %
5-40 SYRINGE (ML) INJECTION EVERY 8 HOURS
Status: CANCELLED | OUTPATIENT
Start: 2019-07-14

## 2019-07-14 RX ORDER — ONDANSETRON 2 MG/ML
8 INJECTION INTRAMUSCULAR; INTRAVENOUS ONCE
Status: COMPLETED | OUTPATIENT
Start: 2019-07-14 | End: 2019-07-14

## 2019-07-14 RX ORDER — SODIUM CHLORIDE 9 MG/ML
25 INJECTION, SOLUTION INTRAVENOUS CONTINUOUS
Status: DISCONTINUED | OUTPATIENT
Start: 2019-07-14 | End: 2019-07-15 | Stop reason: HOSPADM

## 2019-07-14 RX ORDER — SODIUM CHLORIDE 0.9 % (FLUSH) 0.9 %
5-40 SYRINGE (ML) INJECTION AS NEEDED
Status: CANCELLED | OUTPATIENT
Start: 2019-07-14

## 2019-07-14 RX ADMIN — DIPHENHYDRAMINE HYDROCHLORIDE 25 MG: 25 CAPSULE ORAL at 15:20

## 2019-07-14 RX ADMIN — ACETAMINOPHEN 650 MG: 325 TABLET, FILM COATED ORAL at 15:20

## 2019-07-14 RX ADMIN — DECITABINE 40 MG: 50 INJECTION, POWDER, LYOPHILIZED, FOR SOLUTION INTRAVENOUS at 14:50

## 2019-07-14 RX ADMIN — ONDANSETRON 8 MG: 2 INJECTION INTRAMUSCULAR; INTRAVENOUS at 14:21

## 2019-07-14 RX ADMIN — SODIUM CHLORIDE 25 ML/HR: 900 INJECTION, SOLUTION INTRAVENOUS at 14:20

## 2019-07-14 RX ADMIN — Medication 10 ML: at 14:15

## 2019-07-14 RX ADMIN — Medication 10 ML: at 16:15

## 2019-07-14 NOTE — PROGRESS NOTES
Pt ambulatory to area without complaints. Labs drawn peripherally and pt received chemo treatment per order, tolerated well. Noted platelets 29, received premeds/1 unit platelets per order for port placement in morning. Aware of next appt on [email or web address]. Advised to call dr with any issues/concerns. Discharged home without complaints.

## 2019-07-15 ENCOUNTER — HOSPITAL ENCOUNTER (OUTPATIENT)
Dept: INTERVENTIONAL RADIOLOGY/VASCULAR | Age: 74
Discharge: HOME | End: 2019-07-15
Attending: INTERNAL MEDICINE
Payer: MEDICARE

## 2019-07-15 ENCOUNTER — ANESTHESIA (OUTPATIENT)
Dept: SURGERY | Age: 74
End: 2019-07-15
Payer: MEDICARE

## 2019-07-15 ENCOUNTER — HOSPITAL ENCOUNTER (OUTPATIENT)
Age: 74
Setting detail: OUTPATIENT SURGERY
Discharge: HOME | End: 2019-07-15
Attending: RADIOLOGY | Admitting: RADIOLOGY
Payer: MEDICARE

## 2019-07-15 VITALS
BODY MASS INDEX: 29.73 KG/M2 | HEART RATE: 74 BPM | DIASTOLIC BLOOD PRESSURE: 65 MMHG | TEMPERATURE: 98.4 F | HEIGHT: 66 IN | RESPIRATION RATE: 18 BRPM | OXYGEN SATURATION: 95 % | WEIGHT: 185 LBS | SYSTOLIC BLOOD PRESSURE: 139 MMHG

## 2019-07-15 VITALS
TEMPERATURE: 98 F | SYSTOLIC BLOOD PRESSURE: 150 MMHG | OXYGEN SATURATION: 100 % | HEART RATE: 80 BPM | RESPIRATION RATE: 18 BRPM | DIASTOLIC BLOOD PRESSURE: 68 MMHG

## 2019-07-15 DIAGNOSIS — C92.00 ACUTE MYELOID LEUKEMIA NOT HAVING ACHIEVED REMISSION (HCC): ICD-10-CM

## 2019-07-15 LAB
BLD PROD TYP BPU: NORMAL
BPU ID: NORMAL
STATUS OF UNIT,%ST: NORMAL
UNIT DIVISION, %UDIV: 0

## 2019-07-15 PROCEDURE — C1788 PORT, INDWELLING, IMP: HCPCS

## 2019-07-15 PROCEDURE — 74011250636 HC RX REV CODE- 250/636: Performed by: RADIOLOGY

## 2019-07-15 PROCEDURE — 74011000250 HC RX REV CODE- 250: Performed by: RADIOLOGY

## 2019-07-15 PROCEDURE — 36561 INSERT TUNNELED CV CATH: CPT

## 2019-07-15 PROCEDURE — 77030037400 HC ADH TISS HI VISC EXOFIN CHMP -B

## 2019-07-15 PROCEDURE — 76060000032 HC ANESTHESIA 0.5 TO 1 HR: Performed by: RADIOLOGY

## 2019-07-15 PROCEDURE — 77030031139 HC SUT VCRL2 J&J -A

## 2019-07-15 PROCEDURE — 74011250636 HC RX REV CODE- 250/636

## 2019-07-15 PROCEDURE — C1894 INTRO/SHEATH, NON-LASER: HCPCS

## 2019-07-15 PROCEDURE — 77030031131 HC SUT MXN P COVD -B

## 2019-07-15 RX ORDER — CEFAZOLIN SODIUM/WATER 2 G/20 ML
2 SYRINGE (ML) INTRAVENOUS ONCE
Status: COMPLETED | OUTPATIENT
Start: 2019-07-15 | End: 2019-07-15

## 2019-07-15 RX ORDER — HEPARIN SODIUM (PORCINE) LOCK FLUSH IV SOLN 100 UNIT/ML 100 UNIT/ML
500 SOLUTION INTRAVENOUS ONCE
Status: COMPLETED | OUTPATIENT
Start: 2019-07-15 | End: 2019-07-15

## 2019-07-15 RX ORDER — PROPOFOL 10 MG/ML
INJECTION, EMULSION INTRAVENOUS
Status: DISCONTINUED | OUTPATIENT
Start: 2019-07-15 | End: 2019-07-15 | Stop reason: HOSPADM

## 2019-07-15 RX ORDER — LIDOCAINE HYDROCHLORIDE 20 MG/ML
INJECTION, SOLUTION EPIDURAL; INFILTRATION; INTRACAUDAL; PERINEURAL AS NEEDED
Status: DISCONTINUED | OUTPATIENT
Start: 2019-07-15 | End: 2019-07-15 | Stop reason: HOSPADM

## 2019-07-15 RX ORDER — SODIUM CHLORIDE, SODIUM LACTATE, POTASSIUM CHLORIDE, CALCIUM CHLORIDE 600; 310; 30; 20 MG/100ML; MG/100ML; MG/100ML; MG/100ML
INJECTION, SOLUTION INTRAVENOUS
Status: DISCONTINUED | OUTPATIENT
Start: 2019-07-15 | End: 2019-07-15 | Stop reason: HOSPADM

## 2019-07-15 RX ORDER — ONDANSETRON 2 MG/ML
INJECTION INTRAMUSCULAR; INTRAVENOUS AS NEEDED
Status: DISCONTINUED | OUTPATIENT
Start: 2019-07-15 | End: 2019-07-15 | Stop reason: HOSPADM

## 2019-07-15 RX ADMIN — SODIUM CHLORIDE, SODIUM LACTATE, POTASSIUM CHLORIDE, CALCIUM CHLORIDE: 600; 310; 30; 20 INJECTION, SOLUTION INTRAVENOUS at 10:47

## 2019-07-15 RX ADMIN — HEPARIN SODIUM (PORCINE) LOCK FLUSH IV SOLN 100 UNIT/ML 500 UNITS: 100 SOLUTION at 11:19

## 2019-07-15 RX ADMIN — LIDOCAINE HYDROCHLORIDE 200 MG: 10; .005 INJECTION, SOLUTION EPIDURAL; INFILTRATION; INTRACAUDAL; PERINEURAL at 11:09

## 2019-07-15 RX ADMIN — LIDOCAINE HYDROCHLORIDE 40 MG: 20 INJECTION, SOLUTION EPIDURAL; INFILTRATION; INTRACAUDAL; PERINEURAL at 10:52

## 2019-07-15 RX ADMIN — HEPARIN SODIUM (PORCINE) LOCK FLUSH IV SOLN 100 UNIT/ML 500 UNITS: 100 SOLUTION at 11:18

## 2019-07-15 RX ADMIN — ONDANSETRON 4 MG: 2 INJECTION INTRAMUSCULAR; INTRAVENOUS at 11:23

## 2019-07-15 RX ADMIN — Medication 2 G: at 10:52

## 2019-07-15 RX ADMIN — PROPOFOL 140 MCG/KG/MIN: 10 INJECTION, EMULSION INTRAVENOUS at 10:52

## 2019-07-15 NOTE — DISCHARGE INSTRUCTIONS
[address] [facility]  Department of Interventional Radiology  [facility]  [phone] Office  [phone] Fax  Implanted Port Discharge Instructions      General Instructions:   A port is like an implanted IV. They are usually ordered for patients who will be getting chemotherapy, but can also be used as an IV for long term antibiotics, large amounts of fluids, and/or blood products. Your blood can be drawn from your port for labs also. Those patients who do not have good veins find the ports convenient as they can get the IV they need with one stick. The port can be used long term, and the care is easy. The device is under the skin, and once the skin heals, care is minimal. All that is required is the nurse who accesses the port will need to flush it with heparinized saline after each use. Ports are usually placed in the chest wall, usually on the right side. But they can be place in the arms and in the abdomen. Home Care Instructions: If your port is in your arm, do not allow blood pressure or other IVs to be place in that arm. Do not allow bra straps or any clothing to rub the skin over the port. Do not bathe or swim until the skin has healed and if the port is accessed. Once it is healed, and when the port is not accessed, it is okay to bathe and swim. Restrict yourself to light activity for the first 5 days after getting the port put in, after that, resume normal activity slowly. You may resume your normal diet and medications. Follow-Up Instructions: Please see your oncologist, or whatever physician ordered the port as he/she has requested of you. Call If: You should call your Physician and/or the Radiology Nurse if you notice redness, pus, swelling, or pain from the area of your incision. Call if you should develop a fever. The nurses who access your port will know to call your doctor if the port does not seem to be working properly. You need to tell the nurses who use the port if you should have any pain or swelling at the site during an infusion. To Reach Us: If you have any questions about your procedure, please call the Interventional Radiology department at [phone]. After business hours (5pm) and weekends, call the answering service at [phone] and ask for the Radiologist on call to be paged. Interventional Radiology General Nurse Discharge    After general anesthesia or intravenous sedation, for 24 hours or while taking prescription Narcotics:  · Limit your activities  · Do not drive and operate hazardous machinery  · Do not make important personal or business decisions  · Do  not drink alcoholic beverages  · If you have not urinated within 8 hours after discharge, please contact your surgeon on call. * Please give a list of your current medications to your Primary Care Provider. * Please update this list whenever your medications are discontinued, doses are     changed, or new medications (including over-the-counter products) are added. * Please carry medication information at all times in case of emergency situations. These are general instructions for a healthy lifestyle:    No smoking/ No tobacco products/ Avoid exposure to second hand smoke  Surgeon General's Warning:  Quitting smoking now greatly reduces serious risk to your health. Obesity, smoking, and sedentary lifestyle greatly increases your risk for illness  A healthy diet, regular physical exercise & weight monitoring are important for maintaining a healthy lifestyle    You may be retaining fluid if you have a history of heart failure or if you experience any of the following symptoms:  Weight gain of 3 pounds or more overnight or 5 pounds in a week, increased swelling in our hands or feet or shortness of breath while lying flat in bed.   Please call your doctor as soon as you notice any of these symptoms; do not wait until your next office visit. Recognize signs and symptoms of STROKE:  F-face looks uneven    A-arms unable to move or move unevenly    S-speech slurred or non-existent    T-time-call 911 as soon as signs and symptoms begin-DO NOT go       Back to bed or wait to see if you get better-TIME IS BRAIN.         Patient Signature:  Date: 7/15/2019  Discharging Nurse: Chad Torres RN

## 2019-07-15 NOTE — PROGRESS NOTES
IR Nurse Pre-Procedure Checklist Part 2      Patient into IR suite Cleo GLOVER CRNA in assist with anesthesia.  See VS    Consent signed: Yes    H&P complete:  Yes    Antibiotics: Yes    Airway/Mallampati Done: Yes    Shaved: Not applicable    Pregnancy Form:Not applicable    Patient Position: Yes    MD Side: Yes     Biopsy Worksheet: Not applicable    Specimen Medium: Not applicable

## 2019-07-15 NOTE — ROUTINE PROCESS
TRANSFER - OUT REPORT:    Verbal report given to Shantal barone RN on [employer]  being transferred to IR recovery room 6 (unit) for routine post - op       Report consisted of patients Situation, Background, Assessment and   Recommendations(SBAR). Information from the following report(s) SBAR, Procedure Summary and MAR was reviewed with the receiving nurse. Opportunity for questions and clarification was provided.       Patient transported with:   Registered Nurse

## 2019-07-15 NOTE — H&P
Department of Interventional Radiology  [phone]    History and Physical    Patient:  Juan Plascencia MRN:  [id number]  SSN:  [id number]    YOB: 1945  Age:  68 y.o. Sex:  female      Primary Care Provider:  Solo Gutierrez MD  Referring Physician:  Carl Julio MD    Subjective:     Chief Complaint: port    History of the Present Illness: The patient is a 68 y.o. female who presents for venous chest port placement. AML. Chest port removed last month for cellulitis. Antibiotics completed. Feels well. NPO. Past Medical History:   Diagnosis Date    AML (acute myeloid leukemia) ([facility] [address] dx- 4/2019    followed by dr Vaishali Reyna    Depression     Hypercholesterolemia     Infection     of port -- was placed 5/2019-- removed 6/2019---right chest    Psychiatric disorder     aniexty    Sleep apnea      Past Surgical History:   Procedure Laterality Date    HX OTHER SURGICAL      colonoscopy    IR INSERT TUNL CVC W PORT OVER 5 YEARS  4/30/2019    IR REMOVE TUNL CVAD W PORT/PUMP  6/13/2019        Review of Systems:    Pertinent items are noted in the History of Present Illness. Prior to Admission medications    Medication Sig Start Date End Date Taking? Authorizing Provider   DULoxetine (CYMBALTA) 60 mg capsule Take 1 Cap by mouth daily. 7/10/19  Yes Carl Julio MD   acyclovir (ZOVIRAX) 400 mg tablet Take 1 Tab by mouth two (2) times a day for 30 days. 7/9/19 8/8/19 Yes Carl Julio MD   allopurinol (ZYLOPRIM) 300 mg tablet Take 1 Tab by mouth daily for 30 days. 7/9/19 8/8/19 Yes Carl Julio MD   levoFLOXacin (LEVAQUIN) 500 mg tablet Take 1 Tab by mouth every twenty-four (24) hours for 30 days. 7/9/19 8/8/19 Yes Carl Julio MD   fluconazole (DIFLUCAN) 200 mg tablet Take 1 Tab by mouth daily for 30 days. FDA advises cautious prescribing of oral fluconazole in pregnancy. 7/9/19 8/8/19 Yes Carl Julio MD   [facility]) 40 mg tab Take 120 mg by mouth daily. 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 6/7/19  Yes Scooby Quach NP   vit C/E/zinc/lutein/zeaxanthin ([address] PO) Take 1 Tab by mouth daily. Yes Provider, Historical   LORazepam (ATIVAN) 1 mg tablet Take  by mouth nightly. Yes Provider, Historical   acetaminophen 500 mg tab 500 mg, diphenhydrAMINE 25 mg cap 25 mg Take  by mouth nightly. Yes Provider, Historical   pravastatin (PRAVACHOL) 10 mg tablet Take  by mouth nightly. Yes Provider, Historical   chlorhexidine (PERIDEX) 0.12 % solution Take 15 mL by mouth two (2) times a day. 6/7/19   Scooby Quach NP   lidocaine-prilocaine (EMLA) topical cream Apply  to affected area as needed for Pain. Apply to port site 45-60 minutes prior to lab appt or infusion 5/2/19   Bacilio Fitzgerald MD   ondansetron hcl (ZOFRAN) 8 mg tablet Take 1 Tab by mouth every eight (8) hours as needed for Nausea.  4/30/19   James Bolanos NP        No Known Allergies    Family History   Problem Relation Age of Onset    Cancer Father      Social History     Tobacco Use    Smoking status: Never Smoker    Smokeless tobacco: Never Used   Substance Use Topics    Alcohol use: Never     Frequency: Never        Objective:       Physical Examination:    Vitals:    07/15/19 0932 07/15/19 0942   BP: 126/56    Pulse: 71    Resp: 18    Temp: 98.4 °F (36.9 °C)    Weight:  83.9 kg (185 lb)   Height:  5' 6\" (1.676 m)       HEART: regular rate and rhythm  LUNG: clear to auscultation bilaterally  ABDOMEN: normal findings: soft, non-tender  WOUND: healed  EXTREMITIES: normal strength, tone, and muscle mass    Laboratory:     Lab Results   Component Value Date/Time    Sodium 140 07/14/2019 02:57 PM    Sodium 140 07/09/2019 03:58 PM    Potassium 3.9 07/14/2019 02:57 PM    Potassium 4.6 07/09/2019 03:58 PM    Chloride 106 07/14/2019 02:57 PM    Chloride 105 07/09/2019 03:58 PM    CO2 29 07/14/2019 02:57 PM    CO2 29 07/09/2019 03:58 PM    Anion gap 5 (L) 07/14/2019 02:57 PM    Anion gap 6 (L) 07/09/2019 03:58 PM    Glucose 114 (H) 07/14/2019 02:57 PM    Glucose 102 (H) 07/09/2019 03:58 PM    BUN 17 07/14/2019 02:57 PM    BUN 16 07/09/2019 03:58 PM    Creatinine 0.88 07/14/2019 02:57 PM    Creatinine 0.87 07/09/2019 03:58 PM    GFR est AA >60 07/14/2019 02:57 PM    GFR est AA >60 07/09/2019 03:58 PM    GFR est non-AA >60 07/14/2019 02:57 PM    GFR est non-AA >60 07/09/2019 03:58 PM    Calcium 8.5 07/14/2019 02:57 PM    Calcium 9.1 07/09/2019 03:58 PM    Magnesium 2.3 07/09/2019 03:58 PM    Magnesium 2.2 07/01/2019 01:11 PM    Phosphorus 3.9 (H) 07/09/2019 03:58 PM    Phosphorus 2.2 (L) 06/12/2019 09:48 AM    Albumin 3.6 07/14/2019 02:57 PM    Albumin 4.1 07/09/2019 03:58 PM    Protein, total 6.3 07/14/2019 02:57 PM    Protein, total 7.1 07/09/2019 03:58 PM    Globulin 2.7 07/14/2019 02:57 PM    Globulin 3.0 07/09/2019 03:58 PM    A-G Ratio 1.3 07/14/2019 02:57 PM    A-G Ratio 1.4 07/09/2019 03:58 PM    AST (SGOT) 25 07/14/2019 02:57 PM    AST (SGOT) 17 07/09/2019 03:58 PM    ALT (SGPT) 26 07/14/2019 02:57 PM    ALT (SGPT) 25 07/09/2019 03:58 PM     Lab Results   Component Value Date/Time    WBC 1.3 (LL) 07/14/2019 02:17 PM    WBC 1.8 (LL) 07/09/2019 03:58 PM    HGB 9.1 (L) 07/14/2019 02:17 PM    HGB 10.0 (L) 07/09/2019 03:58 PM    HCT 26.7 (L) 07/14/2019 02:17 PM    HCT 29.7 (L) 07/09/2019 03:58 PM    PLATELET 29 (LL) 40/65/1348 02:17 PM    PLATELET 43 (L) 37/10/0935 03:58 PM     Lab Results   Component Value Date/Time    aPTT 33.4 04/30/2019 02:58 PM    Prothrombin time 17.6 (H) 04/30/2019 02:58 PM    INR 1.5 04/30/2019 02:58 PM       Assessment:     AML    Hospital Problems  Date Reviewed: 7/2/2019    None          Plan:     Planned Procedure:  Double lumen chest port placement    Risks, benefits, and alternatives reviewed with patient and she agrees to proceed with the procedure.       Signed By: Jeanette Rubinstein, PA-C     July 15, 2019

## 2019-07-15 NOTE — ANESTHESIA POSTPROCEDURE EVALUATION
Procedure(s): PORT INSERT. total IV anesthesia Anesthesia Post Evaluation Multimodal analgesia: multimodal analgesia used between 6 hours prior to anesthesia start to PACU discharge Patient location during evaluation: bedside Patient participation: complete - patient participated Level of consciousness: awake and alert Pain management: adequate Airway patency: patent Anesthetic complications: no 
Cardiovascular status: acceptable Respiratory status: acceptable, spontaneous ventilation and nonlabored ventilation Hydration status: acceptable Post anesthesia nausea and vomiting:  none Vitals Value Taken Time /70 7/15/2019 12:01 PM  
Temp Pulse 76 7/15/2019 12:01 PM  
Resp SpO2 97 % 7/15/2019 12:01 PM

## 2019-07-15 NOTE — PROCEDURES
Department of Interventional Radiology  [phone]        Interventional Radiology Brief Procedure Note    Patient: Buck Potts MRN: [id number]  SSN: [id number]    YOB: 1945  Age: 68 y.o.   Sex: female      Date of Procedure: 7/15/2019    Pre-Procedure Diagnosis: AML    Post-Procedure Diagnosis: SAME    Procedure(s): Venous Chest Port Placement    Brief Description of Procedure: US, fluoro guided left  IJ venous chest port placed    Performed By: Elvis Gaffney PA-C     Assistants: None    Anesthesia:TIVS/MAC    Estimated Blood Loss: Less than 10ml    Specimens:  None    Implants:  Chest Port Placement    Findings: catheter tip in right atrium     Complications: None    Recommendations: ok to use port     Follow Up: Dr. Abran Carmichael    Signed By: Elvis Gaffney PA-C     July 15, 2019

## 2019-07-15 NOTE — ANESTHESIA PREPROCEDURE EVALUATION
Relevant Problems No relevant active problems Anesthetic History No history of anesthetic complications Review of Systems / Medical History Patient summary reviewed, nursing notes reviewed and pertinent labs reviewed Pulmonary Sleep apnea: CPAP Neuro/Psych Psychiatric history Cardiovascular Hyperlipidemia Exercise tolerance: >4 METS 
  
GI/Hepatic/Renal 
Within defined limits Endo/Other Obesity, cancer (AML) and anemia Other Findings Comments: Pancytopenia- transfused platelets 6/23 Physical Exam 
 
Airway Mallampati: II 
 
 
 
 
 Cardiovascular Regular rate and rhythm,  S1 and S2 normal,  no murmur, click, rub, or gallop Dental 
No notable dental hx Pulmonary Breath sounds clear to auscultation Abdominal 
 
 
 
 Other Findings Anesthetic Plan ASA: 3 Anesthesia type: total IV anesthesia Induction: Intravenous Anesthetic plan and risks discussed with: Patient and Spouse Discussed TIVA with its benefits (lower risk of nausea and sore throat, etc.) and risks including possible awareness, patient understands and elects to proceed

## 2019-07-18 ENCOUNTER — HOSPITAL ENCOUNTER (OUTPATIENT)
Dept: LAB | Age: 74
Discharge: HOME | End: 2019-07-18
Payer: MEDICARE

## 2019-07-18 ENCOUNTER — PATIENT OUTREACH (OUTPATIENT)
Dept: CASE MANAGEMENT | Age: 74
End: 2019-07-18

## 2019-07-18 ENCOUNTER — HOSPITAL ENCOUNTER (OUTPATIENT)
Dept: INFUSION THERAPY | Age: 74
Discharge: HOME | End: 2019-07-18
Payer: MEDICARE

## 2019-07-18 DIAGNOSIS — C92.00 ACUTE MYELOID LEUKEMIA NOT HAVING ACHIEVED REMISSION (HCC): ICD-10-CM

## 2019-07-18 DIAGNOSIS — T80.212D PORT OR RESERVOIR INFECTION, SUBSEQUENT ENCOUNTER: ICD-10-CM

## 2019-07-18 DIAGNOSIS — C92.00 ACUTE MYELOID LEUKEMIA NOT HAVING ACHIEVED REMISSION (HCC): Primary | ICD-10-CM

## 2019-07-18 LAB
ALBUMIN SERPL-MCNC: 3.8 G/DL (ref 3.2–4.6)
ALBUMIN/GLOB SERPL: 1.2 {RATIO} (ref 1.2–3.5)
ALP SERPL-CCNC: 100 U/L (ref 50–136)
ALT SERPL-CCNC: 26 U/L (ref 12–65)
ANION GAP SERPL CALC-SCNC: 6 MMOL/L (ref 7–16)
AST SERPL-CCNC: 22 U/L (ref 15–37)
BASOPHILS # BLD: 0 K/UL (ref 0–0.2)
BASOPHILS NFR BLD: 0 % (ref 0–2)
BILIRUB SERPL-MCNC: 0.3 MG/DL (ref 0.2–1.1)
BUN SERPL-MCNC: 16 MG/DL (ref 8–23)
CALCIUM SERPL-MCNC: 8.9 MG/DL (ref 8.3–10.4)
CHLORIDE SERPL-SCNC: 105 MMOL/L (ref 98–107)
CO2 SERPL-SCNC: 29 MMOL/L (ref 21–32)
CREAT SERPL-MCNC: 0.88 MG/DL (ref 0.6–1)
DIFFERENTIAL METHOD BLD: ABNORMAL
EOSINOPHIL # BLD: 0 K/UL (ref 0–0.8)
EOSINOPHIL NFR BLD: 0 % (ref 0.5–7.8)
ERYTHROCYTE [DISTWIDTH] IN BLOOD BY AUTOMATED COUNT: 22.5 % (ref 11.9–14.6)
GLOBULIN SER CALC-MCNC: 3.1 G/DL (ref 2.3–3.5)
GLUCOSE SERPL-MCNC: 120 MG/DL (ref 65–100)
HCT VFR BLD AUTO: 24.2 % (ref 35.8–46.3)
HGB BLD-MCNC: 8.1 G/DL (ref 11.7–15.4)
IMM GRANULOCYTES # BLD AUTO: 0 K/UL (ref 0–0.5)
IMM GRANULOCYTES NFR BLD AUTO: 1 % (ref 0–5)
LYMPHOCYTES # BLD: 0.4 K/UL (ref 0.5–4.6)
LYMPHOCYTES NFR BLD: 29 % (ref 13–44)
MAGNESIUM SERPL-MCNC: 2.2 MG/DL (ref 1.8–2.4)
MCH RBC QN AUTO: 32.5 PG (ref 26.1–32.9)
MCHC RBC AUTO-ENTMCNC: 33.5 G/DL (ref 31.4–35)
MCV RBC AUTO: 97.2 FL (ref 79.6–97.8)
MONOCYTES # BLD: 0.1 K/UL (ref 0.1–1.3)
MONOCYTES NFR BLD: 8 % (ref 4–12)
NEUTS SEG # BLD: 0.8 K/UL (ref 1.7–8.2)
NEUTS SEG NFR BLD: 63 % (ref 43–78)
NRBC # BLD: 0 K/UL (ref 0–0.2)
PLATELET # BLD AUTO: 27 K/UL (ref 150–450)
PMV BLD AUTO: 10.4 FL (ref 9.4–12.3)
POTASSIUM SERPL-SCNC: 3.7 MMOL/L (ref 3.5–5.1)
PROT SERPL-MCNC: 6.9 G/DL (ref 6.3–8.2)
RBC # BLD AUTO: 2.49 M/UL (ref 4.05–5.25)
SODIUM SERPL-SCNC: 140 MMOL/L (ref 136–145)
WBC # BLD AUTO: 1.3 K/UL (ref 4.3–11.1)

## 2019-07-18 PROCEDURE — 85025 COMPLETE CBC W/AUTO DIFF WBC: CPT

## 2019-07-18 PROCEDURE — 96367 TX/PROPH/DG ADDL SEQ IV INF: CPT

## 2019-07-18 PROCEDURE — 80053 COMPREHEN METABOLIC PANEL: CPT

## 2019-07-18 PROCEDURE — 74011250636 HC RX REV CODE- 250/636: Performed by: NURSE PRACTITIONER

## 2019-07-18 PROCEDURE — 87040 BLOOD CULTURE FOR BACTERIA: CPT

## 2019-07-18 PROCEDURE — 36415 COLL VENOUS BLD VENIPUNCTURE: CPT

## 2019-07-18 PROCEDURE — 74011000258 HC RX REV CODE- 258: Performed by: NURSE PRACTITIONER

## 2019-07-18 PROCEDURE — 96365 THER/PROPH/DIAG IV INF INIT: CPT

## 2019-07-18 PROCEDURE — 83735 ASSAY OF MAGNESIUM: CPT

## 2019-07-18 RX ORDER — SODIUM CHLORIDE 0.9 % (FLUSH) 0.9 %
10-60 SYRINGE (ML) INJECTION AS NEEDED
Status: DISCONTINUED | OUTPATIENT
Start: 2019-07-18 | End: 2019-07-22 | Stop reason: HOSPADM

## 2019-07-18 RX ADMIN — PIPERACILLIN SODIUM,TAZOBACTAM SODIUM 4.5 G: 4; .5 INJECTION, POWDER, FOR SOLUTION INTRAVENOUS at 15:20

## 2019-07-18 RX ADMIN — VANCOMYCIN HYDROCHLORIDE 1000 MG: 1 INJECTION, POWDER, LYOPHILIZED, FOR SOLUTION INTRAVENOUS at 15:50

## 2019-07-18 RX ADMIN — Medication 10 ML: at 15:20

## 2019-07-18 RX ADMIN — Medication 10 ML: at 16:50

## 2019-07-18 RX ADMIN — Medication 40 ML: at 15:50

## 2019-07-18 NOTE — PROGRESS NOTES
7/18/19:  Patient in for follow-up and toxicity check. Patient feeling tired. New port placed on 7/15. Area surrounding port red and warm. Bruising noted down to breast.  Dr. Venancio Snellen and NP assessed port. Patient to go to infusion for vancomycin and zosyn today. Blood cultures to be drawn from port and peripherally today. Patient to return on Monday for repeat labs and assessment of port. Patient to start clindamycin 600mg tid x 7 days.

## 2019-07-18 NOTE — PROGRESS NOTES
Arrived to the [facility]. Blood cultures peripherally and from double port both medially and laterally obtained as per ordered. After obtaining blood cultures, patient received Zosyn IV and Vancomycin IV through peripheral line as ordered. Patient tolerated well. Any issues or concerns during appointment: none. Patient aware of next infusion appointment on 7/22 (date) at 9:30 AM (time). Discharged ambulatory.

## 2019-07-22 ENCOUNTER — PATIENT OUTREACH (OUTPATIENT)
Dept: CASE MANAGEMENT | Age: 74
End: 2019-07-22

## 2019-07-22 ENCOUNTER — HOSPITAL ENCOUNTER (OUTPATIENT)
Dept: LAB | Age: 74
Discharge: HOME | End: 2019-07-22
Payer: MEDICARE

## 2019-07-22 ENCOUNTER — HOSPITAL ENCOUNTER (OUTPATIENT)
Dept: INFUSION THERAPY | Age: 74
End: 2019-07-22

## 2019-07-22 ENCOUNTER — HOSPITAL ENCOUNTER (OUTPATIENT)
Dept: INFUSION THERAPY | Age: 74
Discharge: HOME | End: 2019-07-22
Payer: MEDICARE

## 2019-07-22 VITALS
RESPIRATION RATE: 18 BRPM | HEART RATE: 80 BPM | OXYGEN SATURATION: 96 % | TEMPERATURE: 98.6 F | DIASTOLIC BLOOD PRESSURE: 49 MMHG | SYSTOLIC BLOOD PRESSURE: 130 MMHG

## 2019-07-22 DIAGNOSIS — C92.00 ACUTE MYELOID LEUKEMIA NOT HAVING ACHIEVED REMISSION (HCC): ICD-10-CM

## 2019-07-22 DIAGNOSIS — D61.810 PANCYTOPENIA DUE TO ANTINEOPLASTIC CHEMOTHERAPY (HCC): ICD-10-CM

## 2019-07-22 DIAGNOSIS — T45.1X5A PANCYTOPENIA DUE TO ANTINEOPLASTIC CHEMOTHERAPY (HCC): ICD-10-CM

## 2019-07-22 LAB
ALBUMIN SERPL-MCNC: 3.6 G/DL (ref 3.2–4.6)
ALBUMIN/GLOB SERPL: 1.2 {RATIO} (ref 1.2–3.5)
ALP SERPL-CCNC: 93 U/L (ref 50–136)
ALT SERPL-CCNC: 26 U/L (ref 12–65)
ANION GAP SERPL CALC-SCNC: 4 MMOL/L (ref 7–16)
AST SERPL-CCNC: 25 U/L (ref 15–37)
BASOPHILS # BLD: 0 K/UL (ref 0–0.2)
BASOPHILS NFR BLD: 0 % (ref 0–2)
BILIRUB SERPL-MCNC: 0.5 MG/DL (ref 0.2–1.1)
BUN SERPL-MCNC: 18 MG/DL (ref 8–23)
CALCIUM SERPL-MCNC: 9 MG/DL (ref 8.3–10.4)
CHLORIDE SERPL-SCNC: 107 MMOL/L (ref 98–107)
CO2 SERPL-SCNC: 28 MMOL/L (ref 21–32)
CREAT SERPL-MCNC: 0.93 MG/DL (ref 0.6–1)
DIFFERENTIAL METHOD BLD: ABNORMAL
EOSINOPHIL # BLD: 0 K/UL (ref 0–0.8)
EOSINOPHIL NFR BLD: 0 % (ref 0.5–7.8)
ERYTHROCYTE [DISTWIDTH] IN BLOOD BY AUTOMATED COUNT: 22.2 % (ref 11.9–14.6)
GLOBULIN SER CALC-MCNC: 3.1 G/DL (ref 2.3–3.5)
GLUCOSE SERPL-MCNC: 84 MG/DL (ref 65–100)
HCT VFR BLD AUTO: 21.3 % (ref 35.8–46.3)
HGB BLD-MCNC: 7.2 G/DL (ref 11.7–15.4)
IMM GRANULOCYTES # BLD AUTO: 0 K/UL (ref 0–0.5)
IMM GRANULOCYTES NFR BLD AUTO: 1 % (ref 0–5)
LYMPHOCYTES # BLD: 0.4 K/UL (ref 0.5–4.6)
LYMPHOCYTES NFR BLD: 46 % (ref 13–44)
MAGNESIUM SERPL-MCNC: 2.2 MG/DL (ref 1.8–2.4)
MCH RBC QN AUTO: 32.7 PG (ref 26.1–32.9)
MCHC RBC AUTO-ENTMCNC: 33.8 G/DL (ref 31.4–35)
MCV RBC AUTO: 96.8 FL (ref 79.6–97.8)
MONOCYTES # BLD: 0.1 K/UL (ref 0.1–1.3)
MONOCYTES NFR BLD: 9 % (ref 4–12)
NEUTS SEG # BLD: 0.4 K/UL (ref 1.7–8.2)
NEUTS SEG NFR BLD: 45 % (ref 43–78)
NRBC # BLD: 0 K/UL (ref 0–0.2)
PLATELET # BLD AUTO: 7 K/UL (ref 150–450)
PMV BLD AUTO: ABNORMAL FL (ref 9.4–12.3)
POTASSIUM SERPL-SCNC: 4.2 MMOL/L (ref 3.5–5.1)
PROT SERPL-MCNC: 6.7 G/DL (ref 6.3–8.2)
RBC # BLD AUTO: 2.2 M/UL (ref 4.05–5.25)
SODIUM SERPL-SCNC: 139 MMOL/L (ref 136–145)
WBC # BLD AUTO: 0.8 K/UL (ref 4.3–11.1)

## 2019-07-22 PROCEDURE — 36430 TRANSFUSION BLD/BLD COMPNT: CPT

## 2019-07-22 PROCEDURE — 96375 TX/PRO/DX INJ NEW DRUG ADDON: CPT

## 2019-07-22 PROCEDURE — 85025 COMPLETE CBC W/AUTO DIFF WBC: CPT

## 2019-07-22 PROCEDURE — 74011250636 HC RX REV CODE- 250/636

## 2019-07-22 PROCEDURE — 99211 OFF/OP EST MAY X REQ PHY/QHP: CPT

## 2019-07-22 PROCEDURE — 36415 COLL VENOUS BLD VENIPUNCTURE: CPT

## 2019-07-22 PROCEDURE — 86900 BLOOD TYPING SEROLOGIC ABO: CPT

## 2019-07-22 PROCEDURE — 86644 CMV ANTIBODY: CPT

## 2019-07-22 PROCEDURE — P9037 PLATE PHERES LEUKOREDU IRRAD: HCPCS

## 2019-07-22 PROCEDURE — 74011250636 HC RX REV CODE- 250/636: Performed by: NURSE PRACTITIONER

## 2019-07-22 PROCEDURE — 74011250637 HC RX REV CODE- 250/637: Performed by: INTERNAL MEDICINE

## 2019-07-22 PROCEDURE — 80053 COMPREHEN METABOLIC PANEL: CPT

## 2019-07-22 PROCEDURE — 77030018667 ADMN ST IV BLD FENW -A

## 2019-07-22 PROCEDURE — 83735 ASSAY OF MAGNESIUM: CPT

## 2019-07-22 PROCEDURE — 86920 COMPATIBILITY TEST SPIN: CPT

## 2019-07-22 PROCEDURE — P9040 RBC LEUKOREDUCED IRRADIATED: HCPCS

## 2019-07-22 PROCEDURE — 96374 THER/PROPH/DIAG INJ IV PUSH: CPT

## 2019-07-22 RX ORDER — DIPHENHYDRAMINE HYDROCHLORIDE 50 MG/ML
INJECTION, SOLUTION INTRAMUSCULAR; INTRAVENOUS
Status: COMPLETED
Start: 2019-07-22 | End: 2019-07-22

## 2019-07-22 RX ORDER — SODIUM CHLORIDE 9 MG/ML
250 INJECTION, SOLUTION INTRAVENOUS AS NEEDED
Status: DISCONTINUED | OUTPATIENT
Start: 2019-07-22 | End: 2019-07-26 | Stop reason: HOSPADM

## 2019-07-22 RX ORDER — DIPHENHYDRAMINE HCL 25 MG
25 CAPSULE ORAL ONCE
Status: COMPLETED | OUTPATIENT
Start: 2019-07-22 | End: 2019-07-22

## 2019-07-22 RX ORDER — ACETAMINOPHEN 325 MG/1
650 TABLET ORAL
Status: COMPLETED | OUTPATIENT
Start: 2019-07-22 | End: 2019-07-22

## 2019-07-22 RX ORDER — DIPHENHYDRAMINE HYDROCHLORIDE 50 MG/ML
25 INJECTION, SOLUTION INTRAMUSCULAR; INTRAVENOUS ONCE
Status: DISCONTINUED | OUTPATIENT
Start: 2019-07-22 | End: 2019-07-23 | Stop reason: HOSPADM

## 2019-07-22 RX ORDER — HYDROCORTISONE SODIUM SUCCINATE 100 MG/2ML
100 INJECTION, POWDER, FOR SOLUTION INTRAMUSCULAR; INTRAVENOUS ONCE
Status: COMPLETED | OUTPATIENT
Start: 2019-07-22 | End: 2019-07-22

## 2019-07-22 RX ADMIN — DIPHENHYDRAMINE HYDROCHLORIDE 25 MG: 25 CAPSULE ORAL at 12:38

## 2019-07-22 RX ADMIN — SODIUM CHLORIDE 1000 ML: 900 INJECTION, SOLUTION INTRAVENOUS at 12:00

## 2019-07-22 RX ADMIN — HYDROCORTISONE SODIUM SUCCINATE 100 MG: 100 INJECTION, POWDER, FOR SOLUTION INTRAMUSCULAR; INTRAVENOUS at 16:15

## 2019-07-22 RX ADMIN — DIPHENHYDRAMINE HYDROCHLORIDE 25 MG: 50 INJECTION, SOLUTION INTRAMUSCULAR; INTRAVENOUS at 15:45

## 2019-07-22 RX ADMIN — ACETAMINOPHEN 650 MG: 325 TABLET, FILM COATED ORAL at 12:30

## 2019-07-22 NOTE — PROGRESS NOTES
Pt. Complains of watery eyes and itching throat and hands. No respiratory distress noted. Call placed to Burton Martinez RN, Nurse Practitioner. Orders given.

## 2019-07-22 NOTE — PROGRESS NOTES
Pt. Discharged ambulatory. Pt. States that symptoms of watery eyes and scratchy throat much better. No complaints or distress noted. To call physician with any problems or concerns. Understanding voiced. To return to Infusions on 7/25/19.

## 2019-07-22 NOTE — PROGRESS NOTES
7/22/19- Pt was seen in the office today with Tiffanie Cornelius NP. Pt reports feeling overall well at home. Port site assessed still with red-yellow bruising and swelling over entire port site and down into left breast. Cultures reviewed which were negative. Pt to proceed to infusion today for 1 unit of PRBC and 1 unit of platelets. She will return to see Dr Umesh Lyle on 7/24 with labs.

## 2019-07-23 LAB
ABO + RH BLD: NORMAL
BACTERIA SPEC CULT: NORMAL
BLD PROD TYP BPU: NORMAL
BLD PROD TYP BPU: NORMAL
BLOOD GROUP ANTIBODIES SERPL: NORMAL
BPU ID: NORMAL
BPU ID: NORMAL
CROSSMATCH RESULT,%XM: NORMAL
SERVICE CMNT-IMP: NORMAL
SPECIMEN EXP DATE BLD: NORMAL
STATUS OF UNIT,%ST: NORMAL
STATUS OF UNIT,%ST: NORMAL
UNIT DIVISION, %UDIV: 0
UNIT DIVISION, %UDIV: 0

## 2019-07-24 ENCOUNTER — HOSPITAL ENCOUNTER (OUTPATIENT)
Dept: LAB | Age: 74
Discharge: HOME | End: 2019-07-24
Payer: MEDICARE

## 2019-07-24 ENCOUNTER — PATIENT OUTREACH (OUTPATIENT)
Dept: CASE MANAGEMENT | Age: 74
End: 2019-07-24

## 2019-07-24 DIAGNOSIS — C92.00 ACUTE MYELOID LEUKEMIA NOT HAVING ACHIEVED REMISSION (HCC): ICD-10-CM

## 2019-07-24 LAB
ALBUMIN SERPL-MCNC: 3.8 G/DL (ref 3.2–4.6)
ALBUMIN/GLOB SERPL: 1.2 {RATIO} (ref 1.2–3.5)
ALP SERPL-CCNC: 102 U/L (ref 50–136)
ALT SERPL-CCNC: 33 U/L (ref 12–65)
ANION GAP SERPL CALC-SCNC: 3 MMOL/L (ref 7–16)
AST SERPL-CCNC: 31 U/L (ref 15–37)
BASOPHILS # BLD: 0 K/UL (ref 0–0.2)
BASOPHILS NFR BLD: 0 % (ref 0–2)
BILIRUB SERPL-MCNC: 0.4 MG/DL (ref 0.2–1.1)
BUN SERPL-MCNC: 16 MG/DL (ref 8–23)
CALCIUM SERPL-MCNC: 8.9 MG/DL (ref 8.3–10.4)
CHLORIDE SERPL-SCNC: 106 MMOL/L (ref 98–107)
CK SERPL-CCNC: 101 U/L (ref 21–215)
CO2 SERPL-SCNC: 30 MMOL/L (ref 21–32)
CREAT SERPL-MCNC: 0.8 MG/DL (ref 0.6–1)
DIFFERENTIAL METHOD BLD: ABNORMAL
EOSINOPHIL # BLD: 0 K/UL (ref 0–0.8)
EOSINOPHIL NFR BLD: 0 % (ref 0.5–7.8)
ERYTHROCYTE [DISTWIDTH] IN BLOOD BY AUTOMATED COUNT: 20.7 % (ref 11.9–14.6)
GLOBULIN SER CALC-MCNC: 3.3 G/DL (ref 2.3–3.5)
GLUCOSE SERPL-MCNC: 101 MG/DL (ref 65–100)
HCT VFR BLD AUTO: 25.6 % (ref 35.8–46.3)
HGB BLD-MCNC: 8.7 G/DL (ref 11.7–15.4)
IMM GRANULOCYTES # BLD AUTO: 0 K/UL (ref 0–0.5)
IMM GRANULOCYTES NFR BLD AUTO: 0 % (ref 0–5)
LYMPHOCYTES # BLD: 0.4 K/UL (ref 0.5–4.6)
LYMPHOCYTES NFR BLD: 66 % (ref 13–44)
MAGNESIUM SERPL-MCNC: 2.3 MG/DL (ref 1.8–2.4)
MCH RBC QN AUTO: 32 PG (ref 26.1–32.9)
MCHC RBC AUTO-ENTMCNC: 34 G/DL (ref 31.4–35)
MCV RBC AUTO: 94.1 FL (ref 79.6–97.8)
MONOCYTES # BLD: 0 K/UL (ref 0.1–1.3)
MONOCYTES NFR BLD: 6 % (ref 4–12)
NEUTS SEG # BLD: 0.2 K/UL (ref 1.7–8.2)
NEUTS SEG NFR BLD: 28 % (ref 43–78)
NRBC # BLD: 0 K/UL (ref 0–0.2)
PLATELET # BLD AUTO: 40 K/UL (ref 150–450)
PMV BLD AUTO: 12.1 FL (ref 9.4–12.3)
POTASSIUM SERPL-SCNC: 4 MMOL/L (ref 3.5–5.1)
PROT SERPL-MCNC: 7.1 G/DL (ref 6.3–8.2)
RBC # BLD AUTO: 2.72 M/UL (ref 4.05–5.25)
SODIUM SERPL-SCNC: 139 MMOL/L (ref 136–145)
TROPONIN I SERPL-MCNC: <0.02 NG/ML (ref 0.02–0.05)
WBC # BLD AUTO: 0.7 K/UL (ref 4.3–11.1)

## 2019-07-24 PROCEDURE — 80053 COMPREHEN METABOLIC PANEL: CPT

## 2019-07-24 PROCEDURE — 82550 ASSAY OF CK (CPK): CPT

## 2019-07-24 PROCEDURE — 82553 CREATINE MB FRACTION: CPT

## 2019-07-24 PROCEDURE — 36415 COLL VENOUS BLD VENIPUNCTURE: CPT

## 2019-07-24 PROCEDURE — 83735 ASSAY OF MAGNESIUM: CPT

## 2019-07-24 PROCEDURE — 85025 COMPLETE CBC W/AUTO DIFF WBC: CPT

## 2019-07-24 PROCEDURE — 84484 ASSAY OF TROPONIN QUANT: CPT

## 2019-07-24 NOTE — PROGRESS NOTES
Pt was seen and labs reviewed by Dr. Abran Carmichael. Port site still very red, swollen and bruised, however Pt states it is looking and feeling better. She still has 2 more days left on her Clindamycin, but we will extent it for 7 more days. Script sent to pharmacy. Site does have some bloody drainage today, which is new. Platelets are 40 today. Will plan to keep platelets above 25X while the port is still healing. No replacements needed tomorrow. Pt will return for labs/replacements in infusion on Monday, and will return to clinic on 8/1 to see NP. We will plan to repeat BM bx the week of 8/5 prior to starting next cycle of Dacogen. Navigator to arrange for infusion appt prior to infusion once Bone marrow is scheduled. EKGs abnormal, Dr. Abran Carmichael aware. Cardiac enzymes added to today's labs. Will monitor.

## 2019-07-25 ENCOUNTER — HOSPITAL ENCOUNTER (OUTPATIENT)
Dept: INFUSION THERAPY | Age: 74
End: 2019-07-25
Payer: MEDICARE

## 2019-07-25 LAB
CK MB CFR SERPL CALC: NORMAL %
CK MB SERPL-MCNC: <1 NG/ML (ref 0.5–3.6)
CK SERPL-CCNC: 98 U/L (ref 21–215)

## 2019-07-29 ENCOUNTER — HOSPITAL ENCOUNTER (OUTPATIENT)
Dept: INFUSION THERAPY | Age: 74
Discharge: HOME | End: 2019-07-29
Payer: MEDICARE

## 2019-07-29 VITALS
TEMPERATURE: 97.9 F | HEART RATE: 74 BPM | SYSTOLIC BLOOD PRESSURE: 137 MMHG | BODY MASS INDEX: 30.15 KG/M2 | RESPIRATION RATE: 16 BRPM | WEIGHT: 186.8 LBS | DIASTOLIC BLOOD PRESSURE: 73 MMHG | OXYGEN SATURATION: 98 %

## 2019-07-29 LAB
ALBUMIN SERPL-MCNC: 3.8 G/DL (ref 3.2–4.6)
ALBUMIN/GLOB SERPL: 1.3 {RATIO} (ref 1.2–3.5)
ALP SERPL-CCNC: 104 U/L (ref 50–136)
ALT SERPL-CCNC: 35 U/L (ref 12–65)
ANION GAP SERPL CALC-SCNC: 6 MMOL/L (ref 7–16)
AST SERPL-CCNC: 30 U/L (ref 15–37)
BASOPHILS # BLD: 0 K/UL (ref 0–0.2)
BASOPHILS NFR BLD: 2 % (ref 0–2)
BILIRUB SERPL-MCNC: 0.3 MG/DL (ref 0.2–1.1)
BUN SERPL-MCNC: 21 MG/DL (ref 8–23)
CALCIUM SERPL-MCNC: 9 MG/DL (ref 8.3–10.4)
CHLORIDE SERPL-SCNC: 106 MMOL/L (ref 98–107)
CO2 SERPL-SCNC: 28 MMOL/L (ref 21–32)
CREAT SERPL-MCNC: 1.03 MG/DL (ref 0.6–1)
DIFFERENTIAL METHOD BLD: ABNORMAL
EOSINOPHIL # BLD: 0 K/UL (ref 0–0.8)
EOSINOPHIL NFR BLD: 0 % (ref 0.5–7.8)
ERYTHROCYTE [DISTWIDTH] IN BLOOD BY AUTOMATED COUNT: 20.3 % (ref 11.9–14.6)
GLOBULIN SER CALC-MCNC: 3 G/DL (ref 2.3–3.5)
GLUCOSE SERPL-MCNC: 105 MG/DL (ref 65–100)
HCT VFR BLD AUTO: 22.9 % (ref 35.8–46.3)
HGB BLD-MCNC: 7.8 G/DL (ref 11.7–15.4)
IMM GRANULOCYTES # BLD AUTO: 0 K/UL (ref 0–0.5)
IMM GRANULOCYTES NFR BLD AUTO: 0 % (ref 0–5)
LYMPHOCYTES # BLD: 0.6 K/UL (ref 0.5–4.6)
LYMPHOCYTES NFR BLD: 86 % (ref 13–44)
MAGNESIUM SERPL-MCNC: 2.1 MG/DL (ref 1.8–2.4)
MCH RBC QN AUTO: 31.8 PG (ref 26.1–32.9)
MCHC RBC AUTO-ENTMCNC: 34.1 G/DL (ref 31.4–35)
MCV RBC AUTO: 93.5 FL (ref 79.6–97.8)
MONOCYTES # BLD: 0 K/UL (ref 0.1–1.3)
MONOCYTES NFR BLD: 5 % (ref 4–12)
NEUTS SEG # BLD: 0 K/UL (ref 1.7–8.2)
NEUTS SEG NFR BLD: 7 % (ref 43–78)
NRBC # BLD: 0 K/UL (ref 0–0.2)
PLATELET # BLD AUTO: 12 K/UL (ref 150–450)
PLATELET COMMENTS,PCOM: ABNORMAL
PMV BLD AUTO: 11.7 FL (ref 9.4–12.3)
POTASSIUM SERPL-SCNC: 4.1 MMOL/L (ref 3.5–5.1)
PROT SERPL-MCNC: 6.8 G/DL (ref 6.3–8.2)
RBC # BLD AUTO: 2.45 M/UL (ref 4.05–5.25)
RBC MORPH BLD: ABNORMAL
SODIUM SERPL-SCNC: 140 MMOL/L (ref 136–145)
WBC # BLD AUTO: 0.6 K/UL (ref 4.3–11.1)
WBC MORPH BLD: ABNORMAL

## 2019-07-29 PROCEDURE — 74011250636 HC RX REV CODE- 250/636: Performed by: INTERNAL MEDICINE

## 2019-07-29 PROCEDURE — P9037 PLATE PHERES LEUKOREDU IRRAD: HCPCS

## 2019-07-29 PROCEDURE — 36430 TRANSFUSION BLD/BLD COMPNT: CPT

## 2019-07-29 PROCEDURE — 86644 CMV ANTIBODY: CPT

## 2019-07-29 PROCEDURE — 80053 COMPREHEN METABOLIC PANEL: CPT

## 2019-07-29 PROCEDURE — 85025 COMPLETE CBC W/AUTO DIFF WBC: CPT

## 2019-07-29 PROCEDURE — 77030018667 ADMN ST IV BLD FENW -A

## 2019-07-29 PROCEDURE — 83735 ASSAY OF MAGNESIUM: CPT

## 2019-07-29 PROCEDURE — 74011250637 HC RX REV CODE- 250/637: Performed by: INTERNAL MEDICINE

## 2019-07-29 RX ORDER — ACETAMINOPHEN 325 MG/1
650 TABLET ORAL ONCE
Status: COMPLETED | OUTPATIENT
Start: 2019-07-29 | End: 2019-07-29

## 2019-07-29 RX ORDER — SODIUM CHLORIDE 9 MG/ML
250 INJECTION, SOLUTION INTRAVENOUS AS NEEDED
Status: DISCONTINUED | OUTPATIENT
Start: 2019-07-29 | End: 2019-08-02 | Stop reason: HOSPADM

## 2019-07-29 RX ORDER — DIPHENHYDRAMINE HCL 25 MG
25 CAPSULE ORAL ONCE
Status: COMPLETED | OUTPATIENT
Start: 2019-07-29 | End: 2019-07-29

## 2019-07-29 RX ORDER — SODIUM CHLORIDE 0.9 % (FLUSH) 0.9 %
10 SYRINGE (ML) INJECTION AS NEEDED
Status: DISCONTINUED | OUTPATIENT
Start: 2019-07-29 | End: 2019-08-02 | Stop reason: HOSPADM

## 2019-07-29 RX ADMIN — Medication 10 ML: at 16:00

## 2019-07-29 RX ADMIN — DIPHENHYDRAMINE HYDROCHLORIDE 25 MG: 25 CAPSULE ORAL at 15:20

## 2019-07-29 RX ADMIN — ACETAMINOPHEN 650 MG: 325 TABLET, FILM COATED ORAL at 15:20

## 2019-07-29 RX ADMIN — SODIUM CHLORIDE 250 ML: 900 INJECTION, SOLUTION INTRAVENOUS at 15:25

## 2019-07-29 RX ADMIN — Medication 10 ML: at 15:25

## 2019-07-29 NOTE — PROGRESS NOTES
Pt ambulatory to area without complaints. Received premeds/platelets per order, tolerated well. Aware of next appt on [email or web address]. Advised to call dr with any issues/concerns. Discharged home without complaints.

## 2019-07-30 LAB
BLD PROD TYP BPU: NORMAL
BPU ID: NORMAL
STATUS OF UNIT,%ST: NORMAL
UNIT DIVISION, %UDIV: 0

## 2019-08-01 ENCOUNTER — PATIENT OUTREACH (OUTPATIENT)
Dept: CASE MANAGEMENT | Age: 74
End: 2019-08-01

## 2019-08-01 ENCOUNTER — HOSPITAL ENCOUNTER (OUTPATIENT)
Dept: INFUSION THERAPY | Age: 74
Discharge: HOME | End: 2019-08-01
Payer: MEDICARE

## 2019-08-01 ENCOUNTER — HOSPITAL ENCOUNTER (OUTPATIENT)
Dept: LAB | Age: 74
Discharge: HOME | End: 2019-08-01
Payer: MEDICARE

## 2019-08-01 DIAGNOSIS — C92.00 ACUTE MYELOID LEUKEMIA NOT HAVING ACHIEVED REMISSION (HCC): Primary | ICD-10-CM

## 2019-08-01 DIAGNOSIS — C92.00 ACUTE MYELOID LEUKEMIA NOT HAVING ACHIEVED REMISSION (HCC): ICD-10-CM

## 2019-08-01 LAB
ALBUMIN SERPL-MCNC: 3.9 G/DL (ref 3.2–4.6)
ALBUMIN/GLOB SERPL: 1.2 {RATIO} (ref 1.2–3.5)
ALP SERPL-CCNC: 107 U/L (ref 50–136)
ALT SERPL-CCNC: 37 U/L (ref 12–65)
ANION GAP SERPL CALC-SCNC: 6 MMOL/L (ref 7–16)
AST SERPL-CCNC: 34 U/L (ref 15–37)
BASOPHILS # BLD: 0 K/UL (ref 0–0.2)
BASOPHILS NFR BLD: 0 % (ref 0–2)
BILIRUB SERPL-MCNC: 0.3 MG/DL (ref 0.2–1.1)
BUN SERPL-MCNC: 20 MG/DL (ref 8–23)
CALCIUM SERPL-MCNC: 9.1 MG/DL (ref 8.3–10.4)
CHLORIDE SERPL-SCNC: 106 MMOL/L (ref 98–107)
CO2 SERPL-SCNC: 26 MMOL/L (ref 21–32)
CREAT SERPL-MCNC: 1.15 MG/DL (ref 0.6–1)
DIFFERENTIAL METHOD BLD: ABNORMAL
EOSINOPHIL # BLD: 0 K/UL (ref 0–0.8)
EOSINOPHIL NFR BLD: 0 % (ref 0.5–7.8)
ERYTHROCYTE [DISTWIDTH] IN BLOOD BY AUTOMATED COUNT: 20.1 % (ref 11.9–14.6)
GLOBULIN SER CALC-MCNC: 3.2 G/DL (ref 2.3–3.5)
GLUCOSE SERPL-MCNC: 91 MG/DL (ref 65–100)
HCT VFR BLD AUTO: 21.5 % (ref 35.8–46.3)
HGB BLD-MCNC: 7.5 G/DL (ref 11.7–15.4)
IMM GRANULOCYTES # BLD AUTO: 0 K/UL (ref 0–0.5)
IMM GRANULOCYTES NFR BLD AUTO: 0 % (ref 0–5)
LYMPHOCYTES # BLD: 0.5 K/UL (ref 0.5–4.6)
LYMPHOCYTES NFR BLD: 90 % (ref 13–44)
MAGNESIUM SERPL-MCNC: 2.1 MG/DL (ref 1.8–2.4)
MCH RBC QN AUTO: 32.5 PG (ref 26.1–32.9)
MCHC RBC AUTO-ENTMCNC: 34.9 G/DL (ref 31.4–35)
MCV RBC AUTO: 93.1 FL (ref 79.6–97.8)
MONOCYTES # BLD: 0 K/UL (ref 0.1–1.3)
MONOCYTES NFR BLD: 2 % (ref 4–12)
NEUTS SEG # BLD: 0 K/UL (ref 1.7–8.2)
NEUTS SEG NFR BLD: 8 % (ref 43–78)
NRBC # BLD: 0 K/UL (ref 0–0.2)
PLATELET # BLD AUTO: 41 K/UL (ref 150–450)
PLATELET COMMENTS,PCOM: ABNORMAL
PMV BLD AUTO: 9.8 FL (ref 9.4–12.3)
POTASSIUM SERPL-SCNC: 4 MMOL/L (ref 3.5–5.1)
PROT SERPL-MCNC: 7.1 G/DL (ref 6.3–8.2)
RBC # BLD AUTO: 2.31 M/UL (ref 4.05–5.25)
RBC MORPH BLD: ABNORMAL
SODIUM SERPL-SCNC: 138 MMOL/L (ref 136–145)
WBC # BLD AUTO: 0.5 K/UL (ref 4.3–11.1)
WBC MORPH BLD: ABNORMAL

## 2019-08-01 PROCEDURE — 74011250636 HC RX REV CODE- 250/636: Performed by: INTERNAL MEDICINE

## 2019-08-01 PROCEDURE — 83735 ASSAY OF MAGNESIUM: CPT

## 2019-08-01 PROCEDURE — 96360 HYDRATION IV INFUSION INIT: CPT

## 2019-08-01 PROCEDURE — 80053 COMPREHEN METABOLIC PANEL: CPT

## 2019-08-01 PROCEDURE — 85025 COMPLETE CBC W/AUTO DIFF WBC: CPT

## 2019-08-01 RX ORDER — SODIUM CHLORIDE 9 MG/ML
1000 INJECTION, SOLUTION INTRAVENOUS ONCE
Status: COMPLETED | OUTPATIENT
Start: 2019-08-01 | End: 2019-08-01

## 2019-08-01 RX ORDER — SODIUM CHLORIDE 0.9 % (FLUSH) 0.9 %
10 SYRINGE (ML) INJECTION AS NEEDED
Status: DISCONTINUED | OUTPATIENT
Start: 2019-08-01 | End: 2019-08-05 | Stop reason: HOSPADM

## 2019-08-01 RX ADMIN — Medication 10 ML: at 09:54

## 2019-08-01 RX ADMIN — SODIUM CHLORIDE 1000 ML: 900 INJECTION, SOLUTION INTRAVENOUS at 09:54

## 2019-08-01 RX ADMIN — Medication 10 ML: at 10:55

## 2019-08-01 NOTE — PROGRESS NOTES
8/1/19:  Patient in for follow-up with NP. Port still bruised and red but bruising less than what was noted last week. Patient reports some bleeding at insertion site because scab peeled off this am in the shower. Patient reports some dizziness with standing and BP lower today. Infusion to give 1 liter of IVF. She will return on Monday for labs and platelets prior to bone marrow biopsy on Tuesday. Dr. Pranay Patrick to see the patient in 1 week. Patient continuing xospata 120mg daily. Clindamycin completing in next day.

## 2019-08-01 NOTE — PROGRESS NOTES
Pt ambulatory to area states feeling dizzy upon standing. Received hydration per order, tolerated well. Aware of next appt on [email or web address]. Advised to call dr with any issues/concerns. Advised pt to rise slowly when standing. Discharged home, states feeling better.

## 2019-08-05 ENCOUNTER — PATIENT OUTREACH (OUTPATIENT)
Dept: CASE MANAGEMENT | Age: 74
End: 2019-08-05

## 2019-08-05 ENCOUNTER — HOSPITAL ENCOUNTER (OUTPATIENT)
Dept: INFUSION THERAPY | Age: 74
Discharge: HOME | End: 2019-08-05
Payer: MEDICARE

## 2019-08-05 VITALS
HEART RATE: 76 BPM | RESPIRATION RATE: 18 BRPM | DIASTOLIC BLOOD PRESSURE: 67 MMHG | OXYGEN SATURATION: 95 % | SYSTOLIC BLOOD PRESSURE: 145 MMHG | TEMPERATURE: 98.5 F

## 2019-08-05 DIAGNOSIS — C92.00 ACUTE MYELOID LEUKEMIA NOT HAVING ACHIEVED REMISSION (HCC): Primary | ICD-10-CM

## 2019-08-05 LAB
ALBUMIN SERPL-MCNC: 3.8 G/DL (ref 3.2–4.6)
ALBUMIN/GLOB SERPL: 1.2 {RATIO} (ref 1.2–3.5)
ALP SERPL-CCNC: 100 U/L (ref 50–136)
ALT SERPL-CCNC: 34 U/L (ref 12–65)
ANION GAP SERPL CALC-SCNC: 8 MMOL/L (ref 7–16)
AST SERPL-CCNC: 28 U/L (ref 15–37)
BASOPHILS # BLD: 0 K/UL (ref 0–0.2)
BASOPHILS NFR BLD: 0 % (ref 0–2)
BILIRUB SERPL-MCNC: 0.4 MG/DL (ref 0.2–1.1)
BUN SERPL-MCNC: 23 MG/DL (ref 8–23)
CALCIUM SERPL-MCNC: 9.3 MG/DL (ref 8.3–10.4)
CHLORIDE SERPL-SCNC: 105 MMOL/L (ref 98–107)
CO2 SERPL-SCNC: 25 MMOL/L (ref 21–32)
CREAT SERPL-MCNC: 1.09 MG/DL (ref 0.6–1)
DIFFERENTIAL METHOD BLD: ABNORMAL
EOSINOPHIL # BLD: 0 K/UL (ref 0–0.8)
EOSINOPHIL NFR BLD: 0 % (ref 0.5–7.8)
ERYTHROCYTE [DISTWIDTH] IN BLOOD BY AUTOMATED COUNT: 20.1 % (ref 11.9–14.6)
GLOBULIN SER CALC-MCNC: 3.2 G/DL (ref 2.3–3.5)
GLUCOSE SERPL-MCNC: 90 MG/DL (ref 65–100)
HCT VFR BLD AUTO: 19 % (ref 35.8–46.3)
HGB BLD-MCNC: 6.6 G/DL (ref 11.7–15.4)
IMM GRANULOCYTES # BLD AUTO: 0 K/UL (ref 0–0.5)
IMM GRANULOCYTES NFR BLD AUTO: 0 % (ref 0–5)
LYMPHOCYTES # BLD: 0.4 K/UL (ref 0.5–4.6)
LYMPHOCYTES NFR BLD: 91 % (ref 13–44)
MAGNESIUM SERPL-MCNC: 2.3 MG/DL (ref 1.8–2.4)
MCH RBC QN AUTO: 31.9 PG (ref 26.1–32.9)
MCHC RBC AUTO-ENTMCNC: 34.7 G/DL (ref 31.4–35)
MCV RBC AUTO: 91.8 FL (ref 79.6–97.8)
MONOCYTES # BLD: 0 K/UL (ref 0.1–1.3)
MONOCYTES NFR BLD: 2 % (ref 4–12)
NEUTS SEG # BLD: 0 K/UL (ref 1.7–8.2)
NEUTS SEG NFR BLD: 7 % (ref 43–78)
NRBC # BLD: 0 K/UL (ref 0–0.2)
PLATELET # BLD AUTO: 11 K/UL (ref 150–450)
PMV BLD AUTO: 9.5 FL (ref 9.4–12.3)
POTASSIUM SERPL-SCNC: 4.2 MMOL/L (ref 3.5–5.1)
PROT SERPL-MCNC: 7 G/DL (ref 6.3–8.2)
RBC # BLD AUTO: 2.07 M/UL (ref 4.05–5.25)
SODIUM SERPL-SCNC: 138 MMOL/L (ref 136–145)
WBC # BLD AUTO: 0.4 K/UL (ref 4.3–11.1)

## 2019-08-05 PROCEDURE — 74011250637 HC RX REV CODE- 250/637: Performed by: INTERNAL MEDICINE

## 2019-08-05 PROCEDURE — 80053 COMPREHEN METABOLIC PANEL: CPT

## 2019-08-05 PROCEDURE — 86901 BLOOD TYPING SEROLOGIC RH(D): CPT

## 2019-08-05 PROCEDURE — P9040 RBC LEUKOREDUCED IRRADIATED: HCPCS

## 2019-08-05 PROCEDURE — 77030018667 ADMN ST IV BLD FENW -A

## 2019-08-05 PROCEDURE — 83735 ASSAY OF MAGNESIUM: CPT

## 2019-08-05 PROCEDURE — 86923 COMPATIBILITY TEST ELECTRIC: CPT

## 2019-08-05 PROCEDURE — 86644 CMV ANTIBODY: CPT

## 2019-08-05 PROCEDURE — 36430 TRANSFUSION BLD/BLD COMPNT: CPT

## 2019-08-05 PROCEDURE — 74011250636 HC RX REV CODE- 250/636: Performed by: INTERNAL MEDICINE

## 2019-08-05 PROCEDURE — 85025 COMPLETE CBC W/AUTO DIFF WBC: CPT

## 2019-08-05 RX ORDER — SODIUM CHLORIDE 9 MG/ML
500 INJECTION, SOLUTION INTRAVENOUS AS NEEDED
Status: DISCONTINUED | OUTPATIENT
Start: 2019-08-05 | End: 2019-08-09 | Stop reason: HOSPADM

## 2019-08-05 RX ORDER — DIPHENHYDRAMINE HCL 25 MG
25 CAPSULE ORAL ONCE
Status: COMPLETED | OUTPATIENT
Start: 2019-08-05 | End: 2019-08-05

## 2019-08-05 RX ORDER — DULOXETIN HYDROCHLORIDE 30 MG/1
30 CAPSULE, DELAYED RELEASE ORAL DAILY
Qty: 30 CAP | Refills: 3 | Status: SHIPPED | OUTPATIENT
Start: 2019-08-05 | End: 2019-08-05 | Stop reason: SDUPTHER

## 2019-08-05 RX ORDER — ACETAMINOPHEN 325 MG/1
650 TABLET ORAL ONCE
Status: COMPLETED | OUTPATIENT
Start: 2019-08-05 | End: 2019-08-05

## 2019-08-05 RX ORDER — SODIUM CHLORIDE 0.9 % (FLUSH) 0.9 %
10 SYRINGE (ML) INJECTION AS NEEDED
Status: DISCONTINUED | OUTPATIENT
Start: 2019-08-05 | End: 2019-08-06 | Stop reason: HOSPADM

## 2019-08-05 RX ADMIN — DIPHENHYDRAMINE HYDROCHLORIDE 25 MG: 25 CAPSULE ORAL at 13:34

## 2019-08-05 RX ADMIN — Medication 10 ML: at 12:00

## 2019-08-05 RX ADMIN — SODIUM CHLORIDE 500 ML: 900 INJECTION, SOLUTION INTRAVENOUS at 12:00

## 2019-08-05 RX ADMIN — ACETAMINOPHEN 650 MG: 325 TABLET, FILM COATED ORAL at 13:34

## 2019-08-05 RX ADMIN — Medication 10 ML: at 17:10

## 2019-08-05 NOTE — PROGRESS NOTES
8/5/19:  Patient in infusion and to receive blood and platelets. Discussed labs with Dr. Hope Webster and bone marrow biopsy on hold until counts recover. Dr. Hope Webster recommends waiting on bone marrow for 7-10 days  Patient to continue with twice weekly labs and replacements and weekly NP visits. Bone marrow biopsy moved to 8/16. If counts not recovering by end of next week, Dr. Hope Webster to be made aware. Patient continues to report dizziness and fatigue to the point she has difficulty completing task during the day. Patient notes change in the way she felt when starting cymbalta. Option to decrease cymbalta to 30mg daily.

## 2019-08-05 NOTE — PROGRESS NOTES
Arrived to the [facility]. Labs drawn, 1 unit of platelets and 1 unit of PRBCs completed. Patient tolerated well. Any issues or concerns during appointment: None. Patient aware of next infusion appointment on August 8th at 1330. Discharged ambulatory.

## 2019-08-06 ENCOUNTER — HOSPITAL ENCOUNTER (OUTPATIENT)
Dept: CT IMAGING | Age: 74
Discharge: HOME | End: 2019-08-06
Attending: INTERNAL MEDICINE

## 2019-08-08 ENCOUNTER — HOSPITAL ENCOUNTER (OUTPATIENT)
Dept: LAB | Age: 74
Discharge: HOME | End: 2019-08-08
Payer: MEDICARE

## 2019-08-08 ENCOUNTER — HOSPITAL ENCOUNTER (OUTPATIENT)
Dept: INFUSION THERAPY | Age: 74
Discharge: HOME | End: 2019-08-08
Payer: MEDICARE

## 2019-08-08 ENCOUNTER — PATIENT OUTREACH (OUTPATIENT)
Dept: CASE MANAGEMENT | Age: 74
End: 2019-08-08

## 2019-08-08 VITALS
TEMPERATURE: 98 F | HEART RATE: 75 BPM | DIASTOLIC BLOOD PRESSURE: 69 MMHG | RESPIRATION RATE: 18 BRPM | SYSTOLIC BLOOD PRESSURE: 129 MMHG

## 2019-08-08 DIAGNOSIS — C92.00 ACUTE MYELOID LEUKEMIA NOT HAVING ACHIEVED REMISSION (HCC): ICD-10-CM

## 2019-08-08 LAB
ALBUMIN SERPL-MCNC: 3.8 G/DL (ref 3.2–4.6)
ALBUMIN/GLOB SERPL: 1.2 {RATIO} (ref 1.2–3.5)
ALP SERPL-CCNC: 104 U/L (ref 50–136)
ALT SERPL-CCNC: 36 U/L (ref 12–65)
ANION GAP SERPL CALC-SCNC: 4 MMOL/L (ref 7–16)
AST SERPL-CCNC: 29 U/L (ref 15–37)
BILIRUB SERPL-MCNC: 0.3 MG/DL (ref 0.2–1.1)
BUN SERPL-MCNC: 18 MG/DL (ref 8–23)
CALCIUM SERPL-MCNC: 9.3 MG/DL (ref 8.3–10.4)
CHLORIDE SERPL-SCNC: 107 MMOL/L (ref 98–107)
CO2 SERPL-SCNC: 29 MMOL/L (ref 21–32)
CREAT SERPL-MCNC: 0.97 MG/DL (ref 0.6–1)
DIFFERENTIAL METHOD BLD: ABNORMAL
ERYTHROCYTE [DISTWIDTH] IN BLOOD BY AUTOMATED COUNT: 18.6 % (ref 11.9–14.6)
GLOBULIN SER CALC-MCNC: 3.3 G/DL (ref 2.3–3.5)
GLUCOSE SERPL-MCNC: 103 MG/DL (ref 65–100)
HCT VFR BLD AUTO: 22 % (ref 35.8–46.3)
HGB BLD-MCNC: 7.6 G/DL (ref 11.7–15.4)
MAGNESIUM SERPL-MCNC: 2.2 MG/DL (ref 1.8–2.4)
MCH RBC QN AUTO: 31.5 PG (ref 26.1–32.9)
MCHC RBC AUTO-ENTMCNC: 34.5 G/DL (ref 31.4–35)
MCV RBC AUTO: 91.3 FL (ref 79.6–97.8)
NRBC # BLD: 0 K/UL (ref 0–0.2)
PLATELET # BLD AUTO: 24 K/UL (ref 150–450)
PLATELET COMMENTS,PCOM: ABNORMAL
PMV BLD AUTO: 9.2 FL (ref 9.4–12.3)
POTASSIUM SERPL-SCNC: 3.9 MMOL/L (ref 3.5–5.1)
PROT SERPL-MCNC: 7.1 G/DL (ref 6.3–8.2)
RBC # BLD AUTO: 2.41 M/UL (ref 4.05–5.25)
RBC MORPH BLD: ABNORMAL
SODIUM SERPL-SCNC: 140 MMOL/L (ref 136–145)
WBC # BLD AUTO: 0.4 K/UL (ref 4.3–11.1)
WBC MORPH BLD: ABNORMAL

## 2019-08-08 PROCEDURE — 74011250637 HC RX REV CODE- 250/637: Performed by: NURSE PRACTITIONER

## 2019-08-08 PROCEDURE — 74011250636 HC RX REV CODE- 250/636: Performed by: NURSE PRACTITIONER

## 2019-08-08 PROCEDURE — P9040 RBC LEUKOREDUCED IRRADIATED: HCPCS

## 2019-08-08 PROCEDURE — 85025 COMPLETE CBC W/AUTO DIFF WBC: CPT

## 2019-08-08 PROCEDURE — 77030018667 ADMN ST IV BLD FENW -A

## 2019-08-08 PROCEDURE — 83735 ASSAY OF MAGNESIUM: CPT

## 2019-08-08 PROCEDURE — 36430 TRANSFUSION BLD/BLD COMPNT: CPT

## 2019-08-08 PROCEDURE — 80053 COMPREHEN METABOLIC PANEL: CPT

## 2019-08-08 PROCEDURE — 86644 CMV ANTIBODY: CPT

## 2019-08-08 RX ORDER — DIPHENHYDRAMINE HCL 25 MG
25 CAPSULE ORAL
Status: DISCONTINUED | OUTPATIENT
Start: 2019-08-08 | End: 2019-08-12 | Stop reason: HOSPADM

## 2019-08-08 RX ORDER — DIPHENHYDRAMINE HCL 50 MG
50 CAPSULE ORAL ONCE
Status: ACTIVE | OUTPATIENT
Start: 2019-08-08 | End: 2019-08-08

## 2019-08-08 RX ORDER — ACETAMINOPHEN 325 MG/1
650 TABLET ORAL ONCE
Status: COMPLETED | OUTPATIENT
Start: 2019-08-08 | End: 2019-08-08

## 2019-08-08 RX ADMIN — DIPHENHYDRAMINE HYDROCHLORIDE 25 MG: 25 CAPSULE ORAL at 13:33

## 2019-08-08 RX ADMIN — ACETAMINOPHEN 650 MG: 325 TABLET, FILM COATED ORAL at 13:33

## 2019-08-08 RX ADMIN — SODIUM CHLORIDE 250 ML: 900 INJECTION, SOLUTION INTRAVENOUS at 13:37

## 2019-08-08 NOTE — PROGRESS NOTES
Arrived to infusion. 1 unit prbcs completed and tolerated well. Denies new issues or concerns. Next appointment is 8/12/19 at [place] [phone]. Discharged ambulatory with .

## 2019-08-08 NOTE — PROGRESS NOTES
8/8/19:  Patient in for follow-up with NP. Patient reports feeling well since receiving blood earlier this week. She will reduce the cymbalta dose today. NP reviewed labs and assessed the patient. Patient chose to stay today for 1 unit of blood. She will return on Monday for labs and replacements and again on 8/15. Possible bone marrow biopsy on 8/16 if counts recovering.

## 2019-08-09 LAB
ABO + RH BLD: NORMAL
BLD PROD TYP BPU: NORMAL
BLD PROD TYP BPU: NORMAL
BLOOD GROUP ANTIBODIES SERPL: NORMAL
BPU ID: NORMAL
BPU ID: NORMAL
CROSSMATCH RESULT,%XM: NORMAL
CROSSMATCH RESULT,%XM: NORMAL
SPECIMEN EXP DATE BLD: NORMAL
STATUS OF UNIT,%ST: NORMAL
STATUS OF UNIT,%ST: NORMAL
UNIT DIVISION, %UDIV: 0
UNIT DIVISION, %UDIV: 0

## 2019-08-12 ENCOUNTER — HOSPITAL ENCOUNTER (OUTPATIENT)
Dept: INFUSION THERAPY | Age: 74
Discharge: HOME | End: 2019-08-12
Payer: MEDICARE

## 2019-08-12 VITALS
DIASTOLIC BLOOD PRESSURE: 74 MMHG | SYSTOLIC BLOOD PRESSURE: 146 MMHG | RESPIRATION RATE: 18 BRPM | TEMPERATURE: 98 F | OXYGEN SATURATION: 97 % | HEART RATE: 74 BPM

## 2019-08-12 DIAGNOSIS — C92.00 ACUTE MYELOID LEUKEMIA NOT HAVING ACHIEVED REMISSION (HCC): Primary | ICD-10-CM

## 2019-08-12 LAB
BASOPHILS # BLD: 0 K/UL (ref 0–0.2)
BASOPHILS NFR BLD: 0 % (ref 0–2)
DIFFERENTIAL METHOD BLD: ABNORMAL
EOSINOPHIL # BLD: 0 K/UL (ref 0–0.8)
EOSINOPHIL NFR BLD: 0 % (ref 0.5–7.8)
ERYTHROCYTE [DISTWIDTH] IN BLOOD BY AUTOMATED COUNT: 17.1 % (ref 11.9–14.6)
HCT VFR BLD AUTO: 24.6 % (ref 35.8–46.3)
HGB BLD-MCNC: 8.6 G/DL (ref 11.7–15.4)
IMM GRANULOCYTES # BLD AUTO: 0 K/UL (ref 0–0.5)
IMM GRANULOCYTES NFR BLD AUTO: 0 % (ref 0–5)
LYMPHOCYTES # BLD: 0.5 K/UL (ref 0.5–4.6)
LYMPHOCYTES NFR BLD: 91 % (ref 13–44)
MCH RBC QN AUTO: 31.5 PG (ref 26.1–32.9)
MCHC RBC AUTO-ENTMCNC: 35 G/DL (ref 31.4–35)
MCV RBC AUTO: 90.1 FL (ref 79.6–97.8)
MONOCYTES # BLD: 0 K/UL (ref 0.1–1.3)
MONOCYTES NFR BLD: 2 % (ref 4–12)
NEUTS SEG # BLD: 0 K/UL (ref 1.7–8.2)
NEUTS SEG NFR BLD: 7 % (ref 43–78)
NRBC # BLD: 0 K/UL (ref 0–0.2)
PLATELET # BLD AUTO: 6 K/UL (ref 150–450)
PLATELET COMMENTS,PCOM: ABNORMAL
PMV BLD AUTO: ABNORMAL FL (ref 9.4–12.3)
RBC # BLD AUTO: 2.73 M/UL (ref 4.05–5.25)
RBC MORPH BLD: ABNORMAL
RBC MORPH BLD: ABNORMAL
WBC # BLD AUTO: 0.5 K/UL (ref 4.3–11.1)
WBC MORPH BLD: ABNORMAL

## 2019-08-12 PROCEDURE — 74011250636 HC RX REV CODE- 250/636: Performed by: NURSE PRACTITIONER

## 2019-08-12 PROCEDURE — 96360 HYDRATION IV INFUSION INIT: CPT

## 2019-08-12 PROCEDURE — 85025 COMPLETE CBC W/AUTO DIFF WBC: CPT

## 2019-08-12 RX ORDER — SODIUM CHLORIDE 9 MG/ML
999 INJECTION, SOLUTION INTRAVENOUS ONCE
Status: COMPLETED | OUTPATIENT
Start: 2019-08-12 | End: 2019-08-12

## 2019-08-12 RX ORDER — SODIUM CHLORIDE 9 MG/ML
250 INJECTION, SOLUTION INTRAVENOUS AS NEEDED
Status: DISCONTINUED | OUTPATIENT
Start: 2019-08-12 | End: 2019-08-16 | Stop reason: HOSPADM

## 2019-08-12 RX ADMIN — SODIUM CHLORIDE 999 ML/HR: 900 INJECTION, SOLUTION INTRAVENOUS at 15:45

## 2019-08-12 NOTE — PROGRESS NOTES
Arrived to the [facility] ambulatory with her . Port accessed labs drawn and reviewed. 1Lt NS bolus  completed. Patient tolerated well. Any issues or concerns during appointment: platelets are 6K today, pt will return tomorrow 8/13 at 1400 for 1ut Platelets. Discharged to home ambulatory with her .

## 2019-08-13 ENCOUNTER — HOSPITAL ENCOUNTER (OUTPATIENT)
Dept: INFUSION THERAPY | Age: 74
Discharge: HOME | End: 2019-08-13
Payer: MEDICARE

## 2019-08-13 VITALS
OXYGEN SATURATION: 97 % | HEART RATE: 76 BPM | RESPIRATION RATE: 18 BRPM | TEMPERATURE: 98.5 F | SYSTOLIC BLOOD PRESSURE: 123 MMHG | DIASTOLIC BLOOD PRESSURE: 76 MMHG | BODY MASS INDEX: 30.47 KG/M2 | WEIGHT: 188.8 LBS

## 2019-08-13 PROCEDURE — 36430 TRANSFUSION BLD/BLD COMPNT: CPT

## 2019-08-13 PROCEDURE — 77030018667 ADMN ST IV BLD FENW -A

## 2019-08-13 PROCEDURE — 86644 CMV ANTIBODY: CPT

## 2019-08-13 PROCEDURE — 74011250637 HC RX REV CODE- 250/637: Performed by: INTERNAL MEDICINE

## 2019-08-13 PROCEDURE — P9037 PLATE PHERES LEUKOREDU IRRAD: HCPCS

## 2019-08-13 PROCEDURE — 74011250636 HC RX REV CODE- 250/636: Performed by: INTERNAL MEDICINE

## 2019-08-13 RX ORDER — ACETAMINOPHEN 325 MG/1
650 TABLET ORAL ONCE
Status: COMPLETED | OUTPATIENT
Start: 2019-08-13 | End: 2019-08-13

## 2019-08-13 RX ORDER — SODIUM CHLORIDE 9 MG/ML
250 INJECTION, SOLUTION INTRAVENOUS AS NEEDED
Status: DISCONTINUED | OUTPATIENT
Start: 2019-08-13 | End: 2019-08-17 | Stop reason: HOSPADM

## 2019-08-13 RX ORDER — DIPHENHYDRAMINE HCL 25 MG
25 CAPSULE ORAL ONCE
Status: COMPLETED | OUTPATIENT
Start: 2019-08-13 | End: 2019-08-13

## 2019-08-13 RX ADMIN — ACETAMINOPHEN 650 MG: 325 TABLET, FILM COATED ORAL at 14:39

## 2019-08-13 RX ADMIN — SODIUM CHLORIDE 250 ML: 900 INJECTION, SOLUTION INTRAVENOUS at 14:36

## 2019-08-13 RX ADMIN — DIPHENHYDRAMINE HYDROCHLORIDE 25 MG: 25 CAPSULE ORAL at 14:39

## 2019-08-14 LAB
BLD PROD TYP BPU: NORMAL
BPU ID: NORMAL
STATUS OF UNIT,%ST: NORMAL
UNIT DIVISION, %UDIV: 0

## 2019-08-15 ENCOUNTER — HOSPITAL ENCOUNTER (OUTPATIENT)
Dept: INFUSION THERAPY | Age: 74
Discharge: HOME | End: 2019-08-15
Payer: MEDICARE

## 2019-08-15 ENCOUNTER — PATIENT OUTREACH (OUTPATIENT)
Dept: CASE MANAGEMENT | Age: 74
End: 2019-08-15

## 2019-08-15 ENCOUNTER — HOSPITAL ENCOUNTER (OUTPATIENT)
Dept: LAB | Age: 74
Discharge: HOME | End: 2019-08-15
Payer: MEDICARE

## 2019-08-15 VITALS
RESPIRATION RATE: 16 BRPM | SYSTOLIC BLOOD PRESSURE: 154 MMHG | DIASTOLIC BLOOD PRESSURE: 62 MMHG | OXYGEN SATURATION: 96 % | TEMPERATURE: 98.7 F | HEART RATE: 73 BPM

## 2019-08-15 DIAGNOSIS — C92.00 AML (ACUTE MYELOID LEUKEMIA) WITH FAILED REMISSION (HCC): ICD-10-CM

## 2019-08-15 DIAGNOSIS — C92.00 ACUTE MYELOID LEUKEMIA NOT HAVING ACHIEVED REMISSION (HCC): Primary | ICD-10-CM

## 2019-08-15 LAB
ALBUMIN SERPL-MCNC: 3.7 G/DL (ref 3.2–4.6)
ALBUMIN/GLOB SERPL: 1.2 {RATIO} (ref 1.2–3.5)
ALP SERPL-CCNC: 109 U/L (ref 50–136)
ALT SERPL-CCNC: 40 U/L (ref 12–65)
ANION GAP SERPL CALC-SCNC: 6 MMOL/L (ref 7–16)
AST SERPL-CCNC: 31 U/L (ref 15–37)
BASOPHILS # BLD: 0 K/UL (ref 0–0.2)
BASOPHILS NFR BLD: 0 % (ref 0–2)
BILIRUB SERPL-MCNC: 0.4 MG/DL (ref 0.2–1.1)
BUN SERPL-MCNC: 18 MG/DL (ref 8–23)
CALCIUM SERPL-MCNC: 8.9 MG/DL (ref 8.3–10.4)
CHLORIDE SERPL-SCNC: 110 MMOL/L (ref 98–107)
CO2 SERPL-SCNC: 26 MMOL/L (ref 21–32)
CREAT SERPL-MCNC: 0.91 MG/DL (ref 0.6–1)
DIFFERENTIAL METHOD BLD: ABNORMAL
EOSINOPHIL # BLD: 0 K/UL (ref 0–0.8)
EOSINOPHIL NFR BLD: 0 % (ref 0.5–7.8)
ERYTHROCYTE [DISTWIDTH] IN BLOOD BY AUTOMATED COUNT: 17.1 % (ref 11.9–14.6)
GLOBULIN SER CALC-MCNC: 3.1 G/DL (ref 2.3–3.5)
GLUCOSE SERPL-MCNC: 84 MG/DL (ref 65–100)
HCT VFR BLD AUTO: 22.6 % (ref 35.8–46.3)
HGB BLD-MCNC: 7.7 G/DL (ref 11.7–15.4)
IMM GRANULOCYTES # BLD AUTO: 0 K/UL (ref 0–0.5)
IMM GRANULOCYTES NFR BLD AUTO: 0 % (ref 0–5)
LYMPHOCYTES # BLD: 0.4 K/UL (ref 0.5–4.6)
LYMPHOCYTES NFR BLD: 86 % (ref 13–44)
MAGNESIUM SERPL-MCNC: 2.2 MG/DL (ref 1.8–2.4)
MCH RBC QN AUTO: 31.3 PG (ref 26.1–32.9)
MCHC RBC AUTO-ENTMCNC: 34.1 G/DL (ref 31.4–35)
MCV RBC AUTO: 91.9 FL (ref 79.6–97.8)
MONOCYTES # BLD: 0 K/UL (ref 0.1–1.3)
MONOCYTES NFR BLD: 2 % (ref 4–12)
NEUTS SEG # BLD: 0.1 K/UL (ref 1.7–8.2)
NEUTS SEG NFR BLD: 12 % (ref 43–78)
NRBC # BLD: 0 K/UL (ref 0–0.2)
PLATELET # BLD AUTO: 34 K/UL (ref 150–450)
PMV BLD AUTO: 9.8 FL (ref 9.4–12.3)
POTASSIUM SERPL-SCNC: 4.2 MMOL/L (ref 3.5–5.1)
PROT SERPL-MCNC: 6.8 G/DL (ref 6.3–8.2)
RBC # BLD AUTO: 2.46 M/UL (ref 4.05–5.25)
SODIUM SERPL-SCNC: 142 MMOL/L (ref 136–145)
WBC # BLD AUTO: 0.4 K/UL (ref 4.3–11.1)

## 2019-08-15 PROCEDURE — 36430 TRANSFUSION BLD/BLD COMPNT: CPT

## 2019-08-15 PROCEDURE — P9037 PLATE PHERES LEUKOREDU IRRAD: HCPCS

## 2019-08-15 PROCEDURE — 74011250637 HC RX REV CODE- 250/637: Performed by: INTERNAL MEDICINE

## 2019-08-15 PROCEDURE — 77030018667 ADMN ST IV BLD FENW -A

## 2019-08-15 PROCEDURE — 83735 ASSAY OF MAGNESIUM: CPT

## 2019-08-15 PROCEDURE — 85025 COMPLETE CBC W/AUTO DIFF WBC: CPT

## 2019-08-15 PROCEDURE — 80053 COMPREHEN METABOLIC PANEL: CPT

## 2019-08-15 PROCEDURE — 86644 CMV ANTIBODY: CPT

## 2019-08-15 PROCEDURE — 74011250636 HC RX REV CODE- 250/636: Performed by: INTERNAL MEDICINE

## 2019-08-15 RX ORDER — SODIUM CHLORIDE 9 MG/ML
250 INJECTION, SOLUTION INTRAVENOUS AS NEEDED
Status: DISCONTINUED | OUTPATIENT
Start: 2019-08-15 | End: 2019-08-19 | Stop reason: HOSPADM

## 2019-08-15 RX ORDER — DIPHENHYDRAMINE HCL 25 MG
25 CAPSULE ORAL
Status: DISCONTINUED | OUTPATIENT
Start: 2019-08-15 | End: 2019-08-16 | Stop reason: HOSPADM

## 2019-08-15 RX ORDER — ACETAMINOPHEN 325 MG/1
650 TABLET ORAL ONCE
Status: COMPLETED | OUTPATIENT
Start: 2019-08-15 | End: 2019-08-15

## 2019-08-15 RX ADMIN — DIPHENHYDRAMINE HYDROCHLORIDE 25 MG: 25 CAPSULE ORAL at 14:18

## 2019-08-15 RX ADMIN — SODIUM CHLORIDE 250 ML: 900 INJECTION, SOLUTION INTRAVENOUS at 14:15

## 2019-08-15 RX ADMIN — ACETAMINOPHEN 650 MG: 325 TABLET, FILM COATED ORAL at 14:18

## 2019-08-15 NOTE — PROGRESS NOTES
8/15/19- Pt was seen in the office with Marcial Still NP for follow up. Labs reviewed. Today ANC is 0.1 and platelets are 31I. Pt to proceed to infusion today for platelet infusion due to scheduled BMX tomorrow in IR. She will return to see Dr Gail Ontiveros on 8/21.

## 2019-08-15 NOTE — PROGRESS NOTES
Pt arrived ambulatory, platelets infused, pt tolerated well, discharged home ambulatory, aware of appointment tomorrow for bx

## 2019-08-16 ENCOUNTER — HOSPITAL ENCOUNTER (OUTPATIENT)
Dept: LAB | Age: 74
Discharge: HOME | End: 2019-08-16
Attending: INTERNAL MEDICINE
Payer: MEDICARE

## 2019-08-16 ENCOUNTER — HOSPITAL ENCOUNTER (OUTPATIENT)
Dept: CT IMAGING | Age: 74
Discharge: HOME | End: 2019-08-16
Attending: INTERNAL MEDICINE
Payer: MEDICARE

## 2019-08-16 VITALS
TEMPERATURE: 98.1 F | HEART RATE: 73 BPM | OXYGEN SATURATION: 94 % | RESPIRATION RATE: 14 BRPM | DIASTOLIC BLOOD PRESSURE: 60 MMHG | SYSTOLIC BLOOD PRESSURE: 127 MMHG

## 2019-08-16 DIAGNOSIS — C92.00 ACUTE MYELOID LEUKEMIA NOT HAVING ACHIEVED REMISSION (HCC): ICD-10-CM

## 2019-08-16 LAB
BLD PROD TYP BPU: NORMAL
BONE MARROW PREP & W,BMA: NORMAL
BPU ID: NORMAL
DIFFERENTIAL METHOD BLD: ABNORMAL
ERYTHROCYTE [DISTWIDTH] IN BLOOD BY AUTOMATED COUNT: 16.9 % (ref 11.9–14.6)
HCT VFR BLD AUTO: 23.6 % (ref 35.8–46.3)
HGB BLD-MCNC: 8.1 G/DL (ref 11.7–15.4)
MCH RBC QN AUTO: 31.4 PG (ref 26.1–32.9)
MCHC RBC AUTO-ENTMCNC: 34.3 G/DL (ref 31.4–35)
MCV RBC AUTO: 91.5 FL (ref 79.6–97.8)
NRBC # BLD: 0 K/UL (ref 0–0.2)
PLATELET # BLD AUTO: 89 K/UL (ref 150–450)
PLATELET COMMENTS,PCOM: ABNORMAL
PMV BLD AUTO: 10.9 FL (ref 9.4–12.3)
RBC # BLD AUTO: 2.58 M/UL (ref 4.05–5.2)
RBC MORPH BLD: ABNORMAL
STATUS OF UNIT,%ST: NORMAL
UNIT DIVISION, %UDIV: 0
WBC # BLD AUTO: 0.4 K/UL (ref 4.3–11.1)
WBC MORPH BLD: ABNORMAL

## 2019-08-16 PROCEDURE — 88312 SPECIAL STAINS GROUP 1: CPT

## 2019-08-16 PROCEDURE — 74011250636 HC RX REV CODE- 250/636: Performed by: RADIOLOGY

## 2019-08-16 PROCEDURE — 88341 IMHCHEM/IMCYTCHM EA ADD ANTB: CPT

## 2019-08-16 PROCEDURE — 88311 DECALCIFY TISSUE: CPT

## 2019-08-16 PROCEDURE — 74011250636 HC RX REV CODE- 250/636: Performed by: INTERNAL MEDICINE

## 2019-08-16 PROCEDURE — 99153 MOD SED SAME PHYS/QHP EA: CPT

## 2019-08-16 PROCEDURE — 85025 COMPLETE CBC W/AUTO DIFF WBC: CPT

## 2019-08-16 PROCEDURE — 88342 IMHCHEM/IMCYTCHM 1ST ANTB: CPT

## 2019-08-16 PROCEDURE — 88313 SPECIAL STAINS GROUP 2: CPT

## 2019-08-16 PROCEDURE — 88185 FLOWCYTOMETRY/TC ADD-ON: CPT

## 2019-08-16 PROCEDURE — 88305 TISSUE EXAM BY PATHOLOGIST: CPT

## 2019-08-16 PROCEDURE — 74011250636 HC RX REV CODE- 250/636

## 2019-08-16 PROCEDURE — 77012 CT SCAN FOR NEEDLE BIOPSY: CPT

## 2019-08-16 PROCEDURE — 88184 FLOWCYTOMETRY/ TC 1 MARKER: CPT

## 2019-08-16 PROCEDURE — 99152 MOD SED SAME PHYS/QHP 5/>YRS: CPT

## 2019-08-16 RX ORDER — HEPARIN 100 UNIT/ML
500 SYRINGE INTRAVENOUS AS NEEDED
Status: DISCONTINUED | OUTPATIENT
Start: 2019-08-16 | End: 2019-08-20 | Stop reason: HOSPADM

## 2019-08-16 RX ORDER — MIDAZOLAM HYDROCHLORIDE 1 MG/ML
.5-2 INJECTION, SOLUTION INTRAMUSCULAR; INTRAVENOUS
Status: DISCONTINUED | OUTPATIENT
Start: 2019-08-16 | End: 2019-08-20 | Stop reason: HOSPADM

## 2019-08-16 RX ORDER — LIDOCAINE HYDROCHLORIDE 20 MG/ML
1-10 INJECTION, SOLUTION INFILTRATION; PERINEURAL ONCE
Status: COMPLETED | OUTPATIENT
Start: 2019-08-16 | End: 2019-08-16

## 2019-08-16 RX ORDER — FENTANYL CITRATE 50 UG/ML
25-50 INJECTION, SOLUTION INTRAMUSCULAR; INTRAVENOUS
Status: DISCONTINUED | OUTPATIENT
Start: 2019-08-16 | End: 2019-08-20 | Stop reason: HOSPADM

## 2019-08-16 RX ORDER — SODIUM CHLORIDE 9 MG/ML
25 INJECTION, SOLUTION INTRAVENOUS ONCE
Status: DISCONTINUED | OUTPATIENT
Start: 2019-08-16 | End: 2019-08-17 | Stop reason: HOSPADM

## 2019-08-16 RX ADMIN — FENTANYL CITRATE 25 MCG: 50 INJECTION, SOLUTION INTRAMUSCULAR; INTRAVENOUS at 15:11

## 2019-08-16 RX ADMIN — MIDAZOLAM HYDROCHLORIDE 1 MG: 1 INJECTION, SOLUTION INTRAMUSCULAR; INTRAVENOUS at 15:11

## 2019-08-16 RX ADMIN — LIDOCAINE HYDROCHLORIDE 100 MG: 20 INJECTION, SOLUTION INFILTRATION; PERINEURAL at 15:21

## 2019-08-16 RX ADMIN — MIDAZOLAM HYDROCHLORIDE 1 MG: 1 INJECTION, SOLUTION INTRAMUSCULAR; INTRAVENOUS at 15:05

## 2019-08-16 RX ADMIN — FENTANYL CITRATE 50 MCG: 50 INJECTION, SOLUTION INTRAMUSCULAR; INTRAVENOUS at 15:05

## 2019-08-16 RX ADMIN — SODIUM CHLORIDE, PRESERVATIVE FREE 500 UNITS: 5 INJECTION INTRAVENOUS at 16:18

## 2019-08-16 NOTE — PROGRESS NOTES
TRANSFER - IN REPORT:    Verbal report received from Jad, [facility] on Kaylan Stapleton  being received from IR for routine post - op      Report consisted of patients Situation, Background, Assessment and   Recommendations(SBAR). Information from the following report(s) SBAR, Kardex, Procedure Summary and MAR was reviewed with the receiving nurse. Assessment completed upon patients arrival to unit and care assumed.

## 2019-08-16 NOTE — PROGRESS NOTES
Rene Alvarenga PA-C notified of critical result WBC count of 0.4; Readback and Verified; Result from Dalia Nguyen in Lab. No orders received from YAZ Yanes at this time.

## 2019-08-16 NOTE — PROCEDURES
Department of Interventional Radiology  [phone]        Interventional Radiology Brief Procedure Note    Patient: Velasquez Card MRN: [id number]  SSN: [id number]    YOB: 1945  Age: 68 y.o. Sex: female      Date of Procedure: 8/16/2019    Pre-Procedure Diagnosis: AML    Post-Procedure Diagnosis: SAME    Procedure(s): Image Guided Biopsy, Bone Marrow    Brief Description of Procedure: Successful CT guided bone marrow biopsy, right iliac bone. Performed By: Vitaly Benedict MD     Assistants: None    Anesthesia:Moderate Sedation    Estimated Blood Loss: Less than 10ml    Specimens:  Cytology/Pathology    Implants:  None    Findings: No unexpected findings. Complications: None    Follow Up:  Follow up as needed    Signed By: Vitaly Benedict MD     August 16, 2019

## 2019-08-16 NOTE — DISCHARGE INSTRUCTIONS
[address]  Department of Interventional Radiology  [facility]  [phone] Office  [phone] Fax    BIOPSY DISCHARGE INSTRUCTIONS    General Instructions:     A biopsy is the removal of a small piece of tissue for microscopic examination or testing. Healthy tissue can be obtained for the purpose of tissue-type matching for transplants. Unhealthy tissues are more commonly biopsied to diagnose disease. General Biopsy:     A mass can grow in any area of the body, and we are taking a specimen as ordered by your doctor. The risks are the same. They include bleeding, pain, and infection. Home Care Instructions: You may resume your regular diet and medication regimen. Do not drink alcohol, drive, or make any important legal decisions in the next 24 hours. Do not lift anything heavier than a gallon of milk until the soreness goes away. You may use over the counter acetaminophen or ibuprofen for the soreness. You may apply an ice pack to the affected area for 20-30 minutes at time for the first 24 hours. After that, you may apply a heat pack. Call If: You should call your Physician and/or the Radiology Nurse if you have any questions or concerns about the biopsy site. Call if you should have increased pain, fever, redness, drainage, or bleeding more than a small spot on the bandage. Follow-Up Instructions: Please see your ordering doctor as he/she has requested. To Reach Us: If you have any questions about your procedure, please call the Interventional Radiology department at [phone]. After business hours (5pm) and weekends, call the answering service at [phone] and ask for the Radiologist on call to be paged.      Interventional Radiology General Nurse Discharge    After general anesthesia or intravenous sedation, for 24 hours or while taking prescription Narcotics:  · Limit your activities  · Do not drive and operate hazardous machinery  · Do not make important personal or business decisions  · Do  not drink alcoholic beverages  · If you have not urinated within 8 hours after discharge, please contact your surgeon on call. * Please give a list of your current medications to your Primary Care Provider. * Please update this list whenever your medications are discontinued, doses are     changed, or new medications (including over-the-counter products) are added. * Please carry medication information at all times in case of emergency situations. These are general instructions for a healthy lifestyle:    No smoking/ No tobacco products/ Avoid exposure to second hand smoke  Surgeon General's Warning:  Quitting smoking now greatly reduces serious risk to your health. Obesity, smoking, and sedentary lifestyle greatly increases your risk for illness  A healthy diet, regular physical exercise & weight monitoring are important for maintaining a healthy lifestyle    You may be retaining fluid if you have a history of heart failure or if you experience any of the following symptoms:  Weight gain of 3 pounds or more overnight or 5 pounds in a week, increased swelling in our hands or feet or shortness of breath while lying flat in bed. Please call your doctor as soon as you notice any of these symptoms; do not wait until your next office visit. Recognize signs and symptoms of STROKE:  F-face looks uneven    A-arms unable to move or move unevenly    S-speech slurred or non-existent    T-time-call 911 as soon as signs and symptoms begin-DO NOT go       Back to bed or wait to see if you get better-TIME IS BRAIN.         Patient Signature:  Date: 8/16/2019  Discharging Nurse: Nichole Oleary RN

## 2019-08-16 NOTE — H&P
Department of Interventional Radiology  [phone]    History and Physical    Patient:  Cordell Varner MRN:  [id number]  SSN:  [id number]    YOB: 1945  Age:  68 y.o. Sex:  female      Primary Care Provider:  Roslyn Felix MD  Referring Physician:  Lisbeth Blackmon MD    Subjective:     Chief Complaint: biopsy    History of the Present Illness: The patient is a 68 y.o. female who presents for bone marrow biopsy. AML. NPO. Past Medical History:   Diagnosis Date    AML (acute myeloid leukemia) ([facility] [address] dx- 4/2019    followed by dr Kyra Meng    Depression     Hypercholesterolemia     Infection     of port -- was placed 5/2019-- removed 6/2019---right chest    Psychiatric disorder     aniexty    Sleep apnea      Past Surgical History:   Procedure Laterality Date    HX OTHER SURGICAL      colonoscopy    HX VASCULAR ACCESS      IR INSERT TUNL CVC W PORT OVER 5 YEARS  4/30/2019    IR INSERT TUNL CVC W PORT OVER 5 YEARS  7/15/2019    IR REMOVE TUNL CVAD W PORT/PUMP  6/13/2019        Review of Systems:    Pertinent items are noted in the History of Present Illness. Prior to Admission medications    Medication Sig Start Date End Date Taking? Authorizing Provider   DULoxetine (CYMBALTA) 30 mg capsule Take 1 Cap by mouth daily. 8/5/19  Yes Lisbeth Blackmon MD   zolpidem (AMBIEN) 5 mg tablet Take 1 Tab by mouth nightly as needed for Sleep. Max Daily Amount: 5 mg. 8/1/19  Yes Bin Garg NP   gilteritinib (XOSPATA) 40 mg tab Take 120 mg by mouth daily (with lunch). 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 7/18/19  Yes Rosie Montoya NP   cholecalciferol (VITAMIN D3) 400 unit tab tablet Take 2 Tabs by mouth daily. 6/7/19  Yes James Daley NP   vit C/E/zinc/lutein/zeaxanthin ([address] PO) Take 1 Tab by mouth daily.    Yes Provider, Historical   LORazepam (ATIVAN) 1 mg tablet Take  by mouth nightly. Yes Provider, Historical   acetaminophen 500 mg tab 500 mg, diphenhydrAMINE 25 mg cap 25 mg Take  by mouth nightly. Yes Provider, Historical   pravastatin (PRAVACHOL) 10 mg tablet Take  by mouth nightly. Yes Provider, Historical   ondansetron hcl (ZOFRAN) 8 mg tablet Take 1 Tab by mouth every eight (8) hours as needed for Nausea.  4/30/19   Mahsa Candelarioing, NP        No Known Allergies    Family History   Problem Relation Age of Onset    Cancer Father      Social History     Tobacco Use    Smoking status: Never Smoker    Smokeless tobacco: Never Used   Substance Use Topics    Alcohol use: Never     Frequency: Never        Objective:       Physical Examination:    Vitals:    08/16/19 1326   Temp: (P) 98.1 °F (36.7 °C)       HEART: regular rate and rhythm  LUNG: clear to auscultation bilaterally  ABDOMEN: soft, nontender  EXTREMITIES: normal strength, tone, and muscle mass   Left chest port site bruised    Laboratory:     Lab Results   Component Value Date/Time    Sodium 142 08/15/2019 01:24 PM    Sodium 140 08/08/2019 09:49 AM    Potassium 4.2 08/15/2019 01:24 PM    Potassium 3.9 08/08/2019 09:49 AM    Chloride 110 (H) 08/15/2019 01:24 PM    Chloride 107 08/08/2019 09:49 AM    CO2 26 08/15/2019 01:24 PM    CO2 29 08/08/2019 09:49 AM    Anion gap 6 (L) 08/15/2019 01:24 PM    Anion gap 4 (L) 08/08/2019 09:49 AM    Glucose 84 08/15/2019 01:24 PM    Glucose 103 (H) 08/08/2019 09:49 AM    BUN 18 08/15/2019 01:24 PM    BUN 18 08/08/2019 09:49 AM    Creatinine 0.91 08/15/2019 01:24 PM    Creatinine 0.97 08/08/2019 09:49 AM    GFR est AA >60 08/15/2019 01:24 PM    GFR est AA >60 08/08/2019 09:49 AM    GFR est non-AA >60 08/15/2019 01:24 PM    GFR est non-AA 60 (L) 08/08/2019 09:49 AM    Calcium 8.9 08/15/2019 01:24 PM    Calcium 9.3 08/08/2019 09:49 AM    Magnesium 2.2 08/15/2019 01:24 PM    Magnesium 2.2 08/08/2019 09:49 AM    Phosphorus 3.9 (H) 07/09/2019 03:58 PM    Phosphorus 2.2 (L) 06/12/2019 09:48 AM    Albumin 3.7 08/15/2019 01:24 PM    Albumin 3.8 08/08/2019 09:49 AM    Protein, total 6.8 08/15/2019 01:24 PM    Protein, total 7.1 08/08/2019 09:49 AM    Globulin 3.1 08/15/2019 01:24 PM    Globulin 3.3 08/08/2019 09:49 AM    A-G Ratio 1.2 08/15/2019 01:24 PM    A-G Ratio 1.2 08/08/2019 09:49 AM    AST (SGOT) 31 08/15/2019 01:24 PM    AST (SGOT) 29 08/08/2019 09:49 AM    ALT (SGPT) 40 08/15/2019 01:24 PM    ALT (SGPT) 36 08/08/2019 09:49 AM     Lab Results   Component Value Date/Time    WBC 0.4 (LL) 08/16/2019 01:30 PM    WBC 0.4 (LL) 08/15/2019 01:24 PM    HGB 8.1 (L) 08/16/2019 01:30 PM    HGB 7.7 (L) 08/15/2019 01:24 PM    HCT 23.6 (L) 08/16/2019 01:30 PM    HCT 22.6 (L) 08/15/2019 01:24 PM    PLATELET 89 (L) 39/44/9872 01:30 PM    PLATELET 34 (L) 98/84/4461 01:24 PM     Lab Results   Component Value Date/Time    aPTT 33.4 04/30/2019 02:58 PM    Prothrombin time 17.6 (H) 04/30/2019 02:58 PM    INR 1.5 04/30/2019 02:58 PM       Assessment:     AML    Hospital Problems  Date Reviewed: 8/15/2019    None          Plan:     Planned Procedure:  Bone marrow biopsy    Risks, benefits, and alternatives reviewed with patient and she agrees to proceed with the procedure.       Signed By: Pinky Simon PA-C     August 16, 2019

## 2019-08-16 NOTE — PROGRESS NOTES
discharged per MD orders. Biopsy site C,D,I. Chest port flushed and packed and de-accessed. pateint and spouse verbalized understanding of discharge instructions. Transferred out via wheelchair with spouse to drive.

## 2019-08-19 ENCOUNTER — HOSPITAL ENCOUNTER (OUTPATIENT)
Dept: INFUSION THERAPY | Age: 74
Discharge: HOME | End: 2019-08-19
Payer: MEDICARE

## 2019-08-19 DIAGNOSIS — C92.00 ACUTE MYELOID LEUKEMIA NOT HAVING ACHIEVED REMISSION (HCC): Primary | ICD-10-CM

## 2019-08-19 LAB
ALBUMIN SERPL-MCNC: 3.6 G/DL (ref 3.2–4.6)
ALBUMIN/GLOB SERPL: 1.1 {RATIO} (ref 1.2–3.5)
ALP SERPL-CCNC: 114 U/L (ref 50–136)
ALT SERPL-CCNC: 40 U/L (ref 12–65)
ANION GAP SERPL CALC-SCNC: 5 MMOL/L (ref 7–16)
AST SERPL-CCNC: 32 U/L (ref 15–37)
BASOPHILS # BLD: 0 K/UL (ref 0–0.2)
BASOPHILS NFR BLD: 0 % (ref 0–2)
BILIRUB SERPL-MCNC: 0.3 MG/DL (ref 0.2–1.1)
BUN SERPL-MCNC: 15 MG/DL (ref 8–23)
CALCIUM SERPL-MCNC: 9 MG/DL (ref 8.3–10.4)
CHLORIDE SERPL-SCNC: 111 MMOL/L (ref 98–107)
CO2 SERPL-SCNC: 28 MMOL/L (ref 21–32)
CREAT SERPL-MCNC: 0.98 MG/DL (ref 0.6–1)
DIFFERENTIAL METHOD BLD: ABNORMAL
EOSINOPHIL # BLD: 0 K/UL (ref 0–0.8)
EOSINOPHIL NFR BLD: 2 % (ref 0.5–7.8)
ERYTHROCYTE [DISTWIDTH] IN BLOOD BY AUTOMATED COUNT: 17 % (ref 11.9–14.6)
GLOBULIN SER CALC-MCNC: 3.3 G/DL (ref 2.3–3.5)
GLUCOSE SERPL-MCNC: 112 MG/DL (ref 65–100)
HCT VFR BLD AUTO: 21.1 % (ref 35.8–46.3)
HGB BLD-MCNC: 7.3 G/DL (ref 11.7–15.4)
IMM GRANULOCYTES # BLD AUTO: 0 K/UL (ref 0–0.5)
IMM GRANULOCYTES NFR BLD AUTO: 2 % (ref 0–5)
LYMPHOCYTES # BLD: 0.4 K/UL (ref 0.5–4.6)
LYMPHOCYTES NFR BLD: 82 % (ref 13–44)
MAGNESIUM SERPL-MCNC: 2.1 MG/DL (ref 1.8–2.4)
MCH RBC QN AUTO: 31.3 PG (ref 26.1–32.9)
MCHC RBC AUTO-ENTMCNC: 34.6 G/DL (ref 31.4–35)
MCV RBC AUTO: 90.6 FL (ref 79.6–97.8)
MONOCYTES # BLD: 0 K/UL (ref 0.1–1.3)
MONOCYTES NFR BLD: 2 % (ref 4–12)
NEUTS SEG # BLD: 0.1 K/UL (ref 1.7–8.2)
NEUTS SEG NFR BLD: 11 % (ref 43–78)
NRBC # BLD: 0 K/UL (ref 0–0.2)
PLATELET # BLD AUTO: 42 K/UL (ref 150–450)
PLATELET # BLD AUTO: 93 K/UL (ref 150–450)
PMV BLD AUTO: 9.1 FL (ref 9.4–12.3)
POTASSIUM SERPL-SCNC: 3.8 MMOL/L (ref 3.5–5.1)
PROT SERPL-MCNC: 6.9 G/DL (ref 6.3–8.2)
RBC # BLD AUTO: 2.33 M/UL (ref 4.05–5.25)
SODIUM SERPL-SCNC: 144 MMOL/L (ref 136–145)
WBC # BLD AUTO: 0.4 K/UL (ref 4.3–11.1)

## 2019-08-19 PROCEDURE — 77030018667 ADMN ST IV BLD FENW -A

## 2019-08-19 PROCEDURE — 86644 CMV ANTIBODY: CPT

## 2019-08-19 PROCEDURE — 74011250636 HC RX REV CODE- 250/636: Performed by: INTERNAL MEDICINE

## 2019-08-19 PROCEDURE — 85049 AUTOMATED PLATELET COUNT: CPT

## 2019-08-19 PROCEDURE — 80053 COMPREHEN METABOLIC PANEL: CPT

## 2019-08-19 PROCEDURE — 83735 ASSAY OF MAGNESIUM: CPT

## 2019-08-19 PROCEDURE — 74011250637 HC RX REV CODE- 250/637: Performed by: INTERNAL MEDICINE

## 2019-08-19 PROCEDURE — 36430 TRANSFUSION BLD/BLD COMPNT: CPT

## 2019-08-19 PROCEDURE — P9037 PLATE PHERES LEUKOREDU IRRAD: HCPCS

## 2019-08-19 PROCEDURE — 85025 COMPLETE CBC W/AUTO DIFF WBC: CPT

## 2019-08-19 RX ORDER — DIPHENHYDRAMINE HYDROCHLORIDE 50 MG/ML
50 INJECTION, SOLUTION INTRAMUSCULAR; INTRAVENOUS AS NEEDED
Status: ACTIVE | OUTPATIENT
Start: 2019-08-19 | End: 2019-08-19

## 2019-08-19 RX ORDER — ALBUTEROL SULFATE 0.83 MG/ML
2.5 SOLUTION RESPIRATORY (INHALATION) AS NEEDED
Status: ACTIVE | OUTPATIENT
Start: 2019-08-19 | End: 2019-08-19

## 2019-08-19 RX ORDER — ACETAMINOPHEN 325 MG/1
650 TABLET ORAL ONCE
Status: COMPLETED | OUTPATIENT
Start: 2019-08-19 | End: 2019-08-19

## 2019-08-19 RX ORDER — EPINEPHRINE 1 MG/ML
0.3 INJECTION, SOLUTION, CONCENTRATE INTRAVENOUS AS NEEDED
Status: ACTIVE | OUTPATIENT
Start: 2019-08-19 | End: 2019-08-19

## 2019-08-19 RX ORDER — SODIUM CHLORIDE 9 MG/ML
250 INJECTION, SOLUTION INTRAVENOUS AS NEEDED
Status: DISCONTINUED | OUTPATIENT
Start: 2019-08-19 | End: 2019-08-23 | Stop reason: HOSPADM

## 2019-08-19 RX ORDER — SODIUM CHLORIDE 0.9 % (FLUSH) 0.9 %
10 SYRINGE (ML) INJECTION EVERY 8 HOURS
Status: DISCONTINUED | OUTPATIENT
Start: 2019-08-19 | End: 2019-08-23 | Stop reason: HOSPADM

## 2019-08-19 RX ORDER — ONDANSETRON 2 MG/ML
8 INJECTION INTRAMUSCULAR; INTRAVENOUS AS NEEDED
Status: ACTIVE | OUTPATIENT
Start: 2019-08-19 | End: 2019-08-19

## 2019-08-19 RX ORDER — DIPHENHYDRAMINE HCL 25 MG
25 CAPSULE ORAL ONCE
Status: COMPLETED | OUTPATIENT
Start: 2019-08-19 | End: 2019-08-19

## 2019-08-19 RX ORDER — HYDROCORTISONE SODIUM SUCCINATE 100 MG/2ML
100 INJECTION, POWDER, FOR SOLUTION INTRAMUSCULAR; INTRAVENOUS AS NEEDED
Status: ACTIVE | OUTPATIENT
Start: 2019-08-19 | End: 2019-08-19

## 2019-08-19 RX ORDER — ACETAMINOPHEN 325 MG/1
650 TABLET ORAL AS NEEDED
Status: ACTIVE | OUTPATIENT
Start: 2019-08-19 | End: 2019-08-19

## 2019-08-19 RX ADMIN — Medication 10 ML: at 15:30

## 2019-08-19 RX ADMIN — DIPHENHYDRAMINE HYDROCHLORIDE 25 MG: 25 CAPSULE ORAL at 13:48

## 2019-08-19 RX ADMIN — ACETAMINOPHEN 650 MG: 325 TABLET, FILM COATED ORAL at 13:48

## 2019-08-19 RX ADMIN — SODIUM CHLORIDE 250 ML: 900 INJECTION, SOLUTION INTRAVENOUS at 13:48

## 2019-08-19 NOTE — PROGRESS NOTES
[facility] 1 units Platelets completed. Patient tolerated without problems  Type and Cross drawn for Thursday, patient knows to keep Provasculon on. .   Any issues or concerns during appointment: no.  Patient aware of next follow up with MD, scheduling notified to make Infusion appointment for Thursday  Discharged ambulatory.

## 2019-08-20 LAB
BLD PROD TYP BPU: NORMAL
BPU ID: NORMAL
STATUS OF UNIT,%ST: NORMAL
UNIT DIVISION, %UDIV: 0

## 2019-08-21 ENCOUNTER — PATIENT OUTREACH (OUTPATIENT)
Dept: CASE MANAGEMENT | Age: 74
End: 2019-08-21

## 2019-08-21 ENCOUNTER — HOSPITAL ENCOUNTER (OUTPATIENT)
Dept: LAB | Age: 74
Discharge: HOME | End: 2019-08-21
Payer: MEDICARE

## 2019-08-21 VITALS
HEART RATE: 80 BPM | BODY MASS INDEX: 30.07 KG/M2 | TEMPERATURE: 98.3 F | DIASTOLIC BLOOD PRESSURE: 78 MMHG | RESPIRATION RATE: 18 BRPM | SYSTOLIC BLOOD PRESSURE: 126 MMHG | WEIGHT: 186.29 LBS | OXYGEN SATURATION: 96 %

## 2019-08-21 DIAGNOSIS — C92.00 ACUTE MYELOID LEUKEMIA NOT HAVING ACHIEVED REMISSION (HCC): ICD-10-CM

## 2019-08-21 DIAGNOSIS — C92.00 ACUTE MYELOID LEUKEMIA NOT HAVING ACHIEVED REMISSION (HCC): Primary | ICD-10-CM

## 2019-08-21 LAB
ALBUMIN SERPL-MCNC: 3.6 G/DL (ref 3.2–4.6)
ALBUMIN/GLOB SERPL: 1.1 {RATIO} (ref 1.2–3.5)
ALP SERPL-CCNC: 106 U/L (ref 50–136)
ALT SERPL-CCNC: 38 U/L (ref 12–65)
ANION GAP SERPL CALC-SCNC: 7 MMOL/L (ref 7–16)
AST SERPL-CCNC: 28 U/L (ref 15–37)
BASOPHILS # BLD: 0 K/UL (ref 0–0.2)
BASOPHILS NFR BLD: 0 % (ref 0–2)
BILIRUB SERPL-MCNC: 0.3 MG/DL (ref 0.2–1.1)
BUN SERPL-MCNC: 21 MG/DL (ref 8–23)
CALCIUM SERPL-MCNC: 9.2 MG/DL (ref 8.3–10.4)
CHLORIDE SERPL-SCNC: 109 MMOL/L (ref 98–107)
CO2 SERPL-SCNC: 26 MMOL/L (ref 21–32)
CREAT SERPL-MCNC: 0.93 MG/DL (ref 0.6–1)
DIFFERENTIAL METHOD BLD: ABNORMAL
EOSINOPHIL # BLD: 0 K/UL (ref 0–0.8)
EOSINOPHIL NFR BLD: 0 % (ref 0.5–7.8)
ERYTHROCYTE [DISTWIDTH] IN BLOOD BY AUTOMATED COUNT: 16.7 % (ref 11.9–14.6)
GLOBULIN SER CALC-MCNC: 3.4 G/DL (ref 2.3–3.5)
GLUCOSE SERPL-MCNC: 99 MG/DL (ref 65–100)
HCT VFR BLD AUTO: 20.2 % (ref 35.8–46.3)
HGB BLD-MCNC: 6.9 G/DL (ref 11.7–15.4)
IMM GRANULOCYTES # BLD AUTO: 0 K/UL (ref 0–0.5)
IMM GRANULOCYTES NFR BLD AUTO: 0 % (ref 0–5)
LYMPHOCYTES # BLD: 0.4 K/UL (ref 0.5–4.6)
LYMPHOCYTES NFR BLD: 83 % (ref 13–44)
MAGNESIUM SERPL-MCNC: 2.1 MG/DL (ref 1.8–2.4)
MCH RBC QN AUTO: 30.9 PG (ref 26.1–32.9)
MCHC RBC AUTO-ENTMCNC: 34.2 G/DL (ref 31.4–35)
MCV RBC AUTO: 90.6 FL (ref 79.6–97.8)
MONOCYTES # BLD: 0 K/UL (ref 0.1–1.3)
MONOCYTES NFR BLD: 2 % (ref 4–12)
NEUTS SEG # BLD: 0.1 K/UL (ref 1.7–8.2)
NEUTS SEG NFR BLD: 15 % (ref 43–78)
NRBC # BLD: 0 K/UL (ref 0–0.2)
PLATELET # BLD AUTO: 61 K/UL (ref 150–450)
PLATELET COMMENTS,PCOM: ABNORMAL
PMV BLD AUTO: 11.1 FL (ref 9.4–12.3)
POTASSIUM SERPL-SCNC: 3.8 MMOL/L (ref 3.5–5.1)
PROT SERPL-MCNC: 7 G/DL (ref 6.3–8.2)
RBC # BLD AUTO: 2.23 M/UL (ref 4.05–5.25)
RBC MORPH BLD: ABNORMAL
SODIUM SERPL-SCNC: 142 MMOL/L (ref 136–145)
WBC # BLD AUTO: 0.5 K/UL (ref 4.3–11.1)
WBC MORPH BLD: ABNORMAL

## 2019-08-21 PROCEDURE — 86644 CMV ANTIBODY: CPT

## 2019-08-21 PROCEDURE — 85025 COMPLETE CBC W/AUTO DIFF WBC: CPT

## 2019-08-21 PROCEDURE — 83735 ASSAY OF MAGNESIUM: CPT

## 2019-08-21 PROCEDURE — 80053 COMPREHEN METABOLIC PANEL: CPT

## 2019-08-21 PROCEDURE — 86900 BLOOD TYPING SEROLOGIC ABO: CPT

## 2019-08-21 PROCEDURE — 86923 COMPATIBILITY TEST ELECTRIC: CPT

## 2019-08-21 RX ORDER — SODIUM CHLORIDE 9 MG/ML
250 INJECTION, SOLUTION INTRAVENOUS AS NEEDED
Status: DISCONTINUED | OUTPATIENT
Start: 2019-08-21 | End: 2019-08-23 | Stop reason: HOSPADM

## 2019-08-21 NOTE — PROGRESS NOTES
8/21/19:  Patient in for follow-up and to discuss bone marrow biopsy results with Dr. Katerina Nevarez. Patient reports feeling dizzy over past couple of days since hgb dropping. Patient noted some improvement in previously reported dizziness since decreasing cymbalta. Dr. Katerina Nevarez discussed bone marrow biopsy results and noted leukemia cells still present but need to give dacogen and gilteritinib more time to work. Patient and  in agreement with the plan. Patient to receive 2 units of blood tomorrow and plan to start dacogen on Monday. She will continue gilteritinib at 120mg daily. Twice weekly labs and replacements and weekly provider visits.

## 2019-08-22 ENCOUNTER — HOSPITAL ENCOUNTER (OUTPATIENT)
Dept: INFUSION THERAPY | Age: 74
Discharge: HOME | End: 2019-08-22
Payer: MEDICARE

## 2019-08-22 VITALS
TEMPERATURE: 98.4 F | SYSTOLIC BLOOD PRESSURE: 137 MMHG | DIASTOLIC BLOOD PRESSURE: 66 MMHG | OXYGEN SATURATION: 95 % | RESPIRATION RATE: 18 BRPM | HEART RATE: 71 BPM

## 2019-08-22 DIAGNOSIS — C92.00 ACUTE MYELOID LEUKEMIA NOT HAVING ACHIEVED REMISSION (HCC): ICD-10-CM

## 2019-08-22 PROCEDURE — 36430 TRANSFUSION BLD/BLD COMPNT: CPT

## 2019-08-22 PROCEDURE — 74011250636 HC RX REV CODE- 250/636: Performed by: INTERNAL MEDICINE

## 2019-08-22 PROCEDURE — 74011250637 HC RX REV CODE- 250/637: Performed by: INTERNAL MEDICINE

## 2019-08-22 PROCEDURE — P9040 RBC LEUKOREDUCED IRRADIATED: HCPCS

## 2019-08-22 PROCEDURE — 77030018667 ADMN ST IV BLD FENW -A

## 2019-08-22 RX ORDER — SODIUM CHLORIDE 0.9 % (FLUSH) 0.9 %
10 SYRINGE (ML) INJECTION EVERY 8 HOURS
Status: DISCONTINUED | OUTPATIENT
Start: 2019-08-22 | End: 2019-08-26 | Stop reason: HOSPADM

## 2019-08-22 RX ORDER — DIPHENHYDRAMINE HCL 25 MG
25 CAPSULE ORAL
Status: DISCONTINUED | OUTPATIENT
Start: 2019-08-22 | End: 2019-08-23 | Stop reason: HOSPADM

## 2019-08-22 RX ORDER — ACETAMINOPHEN 325 MG/1
650 TABLET ORAL ONCE
Status: COMPLETED | OUTPATIENT
Start: 2019-08-22 | End: 2019-08-22

## 2019-08-22 RX ORDER — SODIUM CHLORIDE 9 MG/ML
250 INJECTION, SOLUTION INTRAVENOUS AS NEEDED
Status: DISCONTINUED | OUTPATIENT
Start: 2019-08-22 | End: 2019-08-26 | Stop reason: HOSPADM

## 2019-08-22 RX ADMIN — ACETAMINOPHEN 650 MG: 325 TABLET, FILM COATED ORAL at 13:46

## 2019-08-22 RX ADMIN — SODIUM CHLORIDE 250 ML: 900 INJECTION, SOLUTION INTRAVENOUS at 13:46

## 2019-08-22 RX ADMIN — DIPHENHYDRAMINE HYDROCHLORIDE 25 MG: 25 CAPSULE ORAL at 13:46

## 2019-08-22 RX ADMIN — Medication 10 ML: at 17:28

## 2019-08-22 NOTE — PROGRESS NOTES
Arrived to the [facility]. 2 units PRBC  completed. Patient tolerated without problems. Any issues or concerns during appointment: no.  Patient aware of next infusion appointment on 8/26/19 (date) at 56 (time). Discharged ambulatory with spouse.

## 2019-08-23 LAB
ABO + RH BLD: NORMAL
BLD PROD TYP BPU: NORMAL
BLD PROD TYP BPU: NORMAL
BLOOD GROUP ANTIBODIES SERPL: NORMAL
BPU ID: NORMAL
BPU ID: NORMAL
CROSSMATCH RESULT,%XM: NORMAL
CROSSMATCH RESULT,%XM: NORMAL
FLOW CYTOMETRY, FBTC1: NORMAL
SPECIMEN EXP DATE BLD: NORMAL
SPECIMEN SOURCE: NORMAL
STATUS OF UNIT,%ST: NORMAL
STATUS OF UNIT,%ST: NORMAL
TEST ORDERED:: NORMAL
UNIT DIVISION, %UDIV: 0
UNIT DIVISION, %UDIV: 0

## 2019-08-26 ENCOUNTER — HOSPITAL ENCOUNTER (OUTPATIENT)
Dept: INFUSION THERAPY | Age: 74
Discharge: HOME | End: 2019-08-26
Payer: MEDICARE

## 2019-08-26 VITALS
WEIGHT: 189 LBS | TEMPERATURE: 98.9 F | DIASTOLIC BLOOD PRESSURE: 77 MMHG | OXYGEN SATURATION: 98 % | SYSTOLIC BLOOD PRESSURE: 131 MMHG | BODY MASS INDEX: 30.51 KG/M2 | RESPIRATION RATE: 18 BRPM | HEART RATE: 80 BPM

## 2019-08-26 DIAGNOSIS — C92.00 ACUTE MYELOID LEUKEMIA NOT HAVING ACHIEVED REMISSION (HCC): Primary | ICD-10-CM

## 2019-08-26 LAB
ALBUMIN SERPL-MCNC: 3.6 G/DL (ref 3.2–4.6)
ALBUMIN/GLOB SERPL: 1.1 {RATIO} (ref 1.2–3.5)
ALP SERPL-CCNC: 111 U/L (ref 50–136)
ALT SERPL-CCNC: 40 U/L (ref 12–65)
ANION GAP SERPL CALC-SCNC: 7 MMOL/L (ref 7–16)
AST SERPL-CCNC: 32 U/L (ref 15–37)
BASOPHILS # BLD: 0 K/UL (ref 0–0.2)
BASOPHILS NFR BLD: 0 % (ref 0–2)
BILIRUB SERPL-MCNC: 0.3 MG/DL (ref 0.2–1.1)
BUN SERPL-MCNC: 19 MG/DL (ref 8–23)
CALCIUM SERPL-MCNC: 9 MG/DL (ref 8.3–10.4)
CHLORIDE SERPL-SCNC: 109 MMOL/L (ref 98–107)
CO2 SERPL-SCNC: 27 MMOL/L (ref 21–32)
CREAT SERPL-MCNC: 1.05 MG/DL (ref 0.6–1)
DIFFERENTIAL METHOD BLD: ABNORMAL
EOSINOPHIL # BLD: 0 K/UL (ref 0–0.8)
EOSINOPHIL NFR BLD: 3 % (ref 0.5–7.8)
ERYTHROCYTE [DISTWIDTH] IN BLOOD BY AUTOMATED COUNT: 16.1 % (ref 11.9–14.6)
GLOBULIN SER CALC-MCNC: 3.4 G/DL (ref 2.3–3.5)
GLUCOSE SERPL-MCNC: 95 MG/DL (ref 65–100)
HCT VFR BLD AUTO: 27.1 % (ref 35.8–46.3)
HGB BLD-MCNC: 9.2 G/DL (ref 11.7–15.4)
IMM GRANULOCYTES # BLD AUTO: 0 K/UL (ref 0–0.5)
IMM GRANULOCYTES NFR BLD AUTO: 5 % (ref 0–5)
LYMPHOCYTES # BLD: 0.5 K/UL (ref 0.5–4.6)
LYMPHOCYTES NFR BLD: 78 % (ref 13–44)
MCH RBC QN AUTO: 30.3 PG (ref 26.1–32.9)
MCHC RBC AUTO-ENTMCNC: 33.9 G/DL (ref 31.4–35)
MCV RBC AUTO: 89.1 FL (ref 79.6–97.8)
MONOCYTES # BLD: 0 K/UL (ref 0.1–1.3)
MONOCYTES NFR BLD: 3 % (ref 4–12)
NEUTS SEG # BLD: 0.1 K/UL (ref 1.7–8.2)
NEUTS SEG NFR BLD: 11 % (ref 43–78)
NRBC # BLD: 0 K/UL (ref 0–0.2)
PLATELET # BLD AUTO: 13 K/UL (ref 150–450)
PLATELET COMMENTS,PCOM: ABNORMAL
PMV BLD AUTO: ABNORMAL FL (ref 9.4–12.3)
POTASSIUM SERPL-SCNC: 4.2 MMOL/L (ref 3.5–5.1)
PROT SERPL-MCNC: 7 G/DL (ref 6.3–8.2)
RBC # BLD AUTO: 3.04 M/UL (ref 4.05–5.25)
RBC MORPH BLD: ABNORMAL
SODIUM SERPL-SCNC: 143 MMOL/L (ref 136–145)
WBC # BLD AUTO: 0.6 K/UL (ref 4.3–11.1)
WBC MORPH BLD: ABNORMAL

## 2019-08-26 PROCEDURE — 96375 TX/PRO/DX INJ NEW DRUG ADDON: CPT

## 2019-08-26 PROCEDURE — 80053 COMPREHEN METABOLIC PANEL: CPT

## 2019-08-26 PROCEDURE — 74011250636 HC RX REV CODE- 250/636: Performed by: INTERNAL MEDICINE

## 2019-08-26 PROCEDURE — 85025 COMPLETE CBC W/AUTO DIFF WBC: CPT

## 2019-08-26 PROCEDURE — 96413 CHEMO IV INFUSION 1 HR: CPT

## 2019-08-26 PROCEDURE — 74011000258 HC RX REV CODE- 258: Performed by: INTERNAL MEDICINE

## 2019-08-26 RX ORDER — SODIUM CHLORIDE 9 MG/ML
25 INJECTION, SOLUTION INTRAVENOUS CONTINUOUS
Status: ACTIVE | OUTPATIENT
Start: 2019-08-26 | End: 2019-08-26

## 2019-08-26 RX ORDER — SODIUM CHLORIDE 9 MG/ML
10 INJECTION INTRAMUSCULAR; INTRAVENOUS; SUBCUTANEOUS AS NEEDED
Status: ACTIVE | OUTPATIENT
Start: 2019-08-26 | End: 2019-08-26

## 2019-08-26 RX ORDER — ONDANSETRON 2 MG/ML
8 INJECTION INTRAMUSCULAR; INTRAVENOUS ONCE
Status: COMPLETED | OUTPATIENT
Start: 2019-08-26 | End: 2019-08-26

## 2019-08-26 RX ADMIN — SODIUM CHLORIDE 10 ML: 9 INJECTION INTRAMUSCULAR; INTRAVENOUS; SUBCUTANEOUS at 11:02

## 2019-08-26 RX ADMIN — SODIUM CHLORIDE 25 ML/HR: 900 INJECTION, SOLUTION INTRAVENOUS at 11:02

## 2019-08-26 RX ADMIN — ONDANSETRON 8 MG: 2 INJECTION INTRAMUSCULAR; INTRAVENOUS at 11:16

## 2019-08-26 RX ADMIN — DECITABINE 40 MG: 50 INJECTION, POWDER, LYOPHILIZED, FOR SOLUTION INTRAVENOUS at 11:45

## 2019-08-26 RX ADMIN — SODIUM CHLORIDE 10 ML: 9 INJECTION INTRAMUSCULAR; INTRAVENOUS; SUBCUTANEOUS at 12:51

## 2019-08-26 NOTE — PROGRESS NOTES
Arrived to the [facility]. Assessment and dacogen infusion completed. Patient tolerated well. Any issues or concerns during appointment: ANC-0.1, WBC-0.6, ANC-0.1. Ynes Dias RN, called at [phone] to inform of lab results. Per Ynes Dias", ok to proceed with treatment due to Dr. Bhavna Negrete' expecting her numbers to drop. \"   Patient aware of next infusion appointment on 08/27/2019 (date) at 1000 (time) with infusion center. Discharged ambulatory, with spouse. Patient advised to call Dr. Bhavna Negrete' office if she has any problems or concerns. Patient verbalized understanding.

## 2019-08-27 ENCOUNTER — HOSPITAL ENCOUNTER (OUTPATIENT)
Dept: INFUSION THERAPY | Age: 74
Discharge: HOME | End: 2019-08-27
Payer: MEDICARE

## 2019-08-27 VITALS
DIASTOLIC BLOOD PRESSURE: 57 MMHG | OXYGEN SATURATION: 98 % | RESPIRATION RATE: 18 BRPM | SYSTOLIC BLOOD PRESSURE: 140 MMHG | WEIGHT: 189.6 LBS | TEMPERATURE: 98.2 F | HEART RATE: 91 BPM | BODY MASS INDEX: 30.6 KG/M2

## 2019-08-27 DIAGNOSIS — C92.00 ACUTE MYELOID LEUKEMIA NOT HAVING ACHIEVED REMISSION (HCC): Primary | ICD-10-CM

## 2019-08-27 LAB — PLATELET # BLD AUTO: 8 K/UL (ref 150–450)

## 2019-08-27 PROCEDURE — 96413 CHEMO IV INFUSION 1 HR: CPT

## 2019-08-27 PROCEDURE — 86644 CMV ANTIBODY: CPT

## 2019-08-27 PROCEDURE — 74011250636 HC RX REV CODE- 250/636: Performed by: INTERNAL MEDICINE

## 2019-08-27 PROCEDURE — 96375 TX/PRO/DX INJ NEW DRUG ADDON: CPT

## 2019-08-27 PROCEDURE — 85049 AUTOMATED PLATELET COUNT: CPT

## 2019-08-27 PROCEDURE — 74011000258 HC RX REV CODE- 258: Performed by: INTERNAL MEDICINE

## 2019-08-27 RX ORDER — SODIUM CHLORIDE 0.9 % (FLUSH) 0.9 %
10 SYRINGE (ML) INJECTION AS NEEDED
Status: ACTIVE | OUTPATIENT
Start: 2019-08-27 | End: 2019-08-27

## 2019-08-27 RX ORDER — ONDANSETRON 2 MG/ML
8 INJECTION INTRAMUSCULAR; INTRAVENOUS ONCE
Status: COMPLETED | OUTPATIENT
Start: 2019-08-27 | End: 2019-08-27

## 2019-08-27 RX ORDER — SODIUM CHLORIDE 9 MG/ML
25 INJECTION, SOLUTION INTRAVENOUS CONTINUOUS
Status: ACTIVE | OUTPATIENT
Start: 2019-08-27 | End: 2019-08-27

## 2019-08-27 RX ADMIN — Medication 10 ML: at 11:45

## 2019-08-27 RX ADMIN — SODIUM CHLORIDE 25 ML/HR: 900 INJECTION, SOLUTION INTRAVENOUS at 10:10

## 2019-08-27 RX ADMIN — ONDANSETRON 8 MG: 2 INJECTION INTRAMUSCULAR; INTRAVENOUS at 10:15

## 2019-08-27 RX ADMIN — Medication 10 ML: at 10:10

## 2019-08-27 RX ADMIN — DECITABINE 40 MG: 50 INJECTION, POWDER, LYOPHILIZED, FOR SOLUTION INTRAVENOUS at 10:45

## 2019-08-27 NOTE — PROGRESS NOTES
Pt ambulatory to area without complaints. Received chemo treatment per order, tolerated well. Platelets ordered for appt tomorrow, pt/navigator aware. Aware of next appt on [facility]. Advised to call dr with any issues/concerns. Discharged home without complaints.

## 2019-08-28 ENCOUNTER — HOSPITAL ENCOUNTER (OUTPATIENT)
Dept: INFUSION THERAPY | Age: 74
Discharge: HOME | End: 2019-08-28
Payer: MEDICARE

## 2019-08-28 VITALS
BODY MASS INDEX: 30.6 KG/M2 | HEART RATE: 80 BPM | WEIGHT: 189.61 LBS | DIASTOLIC BLOOD PRESSURE: 74 MMHG | SYSTOLIC BLOOD PRESSURE: 130 MMHG | RESPIRATION RATE: 18 BRPM | TEMPERATURE: 98.1 F

## 2019-08-28 DIAGNOSIS — C92.00 ACUTE MYELOID LEUKEMIA NOT HAVING ACHIEVED REMISSION (HCC): Primary | ICD-10-CM

## 2019-08-28 PROCEDURE — 96413 CHEMO IV INFUSION 1 HR: CPT

## 2019-08-28 PROCEDURE — 96375 TX/PRO/DX INJ NEW DRUG ADDON: CPT

## 2019-08-28 PROCEDURE — 74011000258 HC RX REV CODE- 258: Performed by: INTERNAL MEDICINE

## 2019-08-28 PROCEDURE — P9037 PLATE PHERES LEUKOREDU IRRAD: HCPCS

## 2019-08-28 PROCEDURE — 74011250637 HC RX REV CODE- 250/637: Performed by: INTERNAL MEDICINE

## 2019-08-28 PROCEDURE — 77030018667 ADMN ST IV BLD FENW -A

## 2019-08-28 PROCEDURE — 36430 TRANSFUSION BLD/BLD COMPNT: CPT

## 2019-08-28 PROCEDURE — 74011250636 HC RX REV CODE- 250/636: Performed by: INTERNAL MEDICINE

## 2019-08-28 RX ORDER — SODIUM CHLORIDE 9 MG/ML
25 INJECTION, SOLUTION INTRAVENOUS CONTINUOUS
Status: ACTIVE | OUTPATIENT
Start: 2019-08-28 | End: 2019-08-28

## 2019-08-28 RX ORDER — ACETAMINOPHEN 325 MG/1
650 TABLET ORAL ONCE
Status: COMPLETED | OUTPATIENT
Start: 2019-08-28 | End: 2019-08-28

## 2019-08-28 RX ORDER — DIPHENHYDRAMINE HCL 25 MG
25 CAPSULE ORAL ONCE
Status: COMPLETED | OUTPATIENT
Start: 2019-08-28 | End: 2019-08-28

## 2019-08-28 RX ORDER — SODIUM CHLORIDE 9 MG/ML
250 INJECTION, SOLUTION INTRAVENOUS AS NEEDED
Status: DISCONTINUED | OUTPATIENT
Start: 2019-08-28 | End: 2019-09-01 | Stop reason: HOSPADM

## 2019-08-28 RX ORDER — ONDANSETRON 2 MG/ML
8 INJECTION INTRAMUSCULAR; INTRAVENOUS ONCE
Status: COMPLETED | OUTPATIENT
Start: 2019-08-28 | End: 2019-08-28

## 2019-08-28 RX ORDER — SODIUM CHLORIDE 9 MG/ML
10 INJECTION INTRAMUSCULAR; INTRAVENOUS; SUBCUTANEOUS AS NEEDED
Status: ACTIVE | OUTPATIENT
Start: 2019-08-28 | End: 2019-08-28

## 2019-08-28 RX ADMIN — SODIUM CHLORIDE 10 ML: 9 INJECTION INTRAMUSCULAR; INTRAVENOUS; SUBCUTANEOUS at 11:37

## 2019-08-28 RX ADMIN — ONDANSETRON 8 MG: 2 INJECTION INTRAMUSCULAR; INTRAVENOUS at 10:00

## 2019-08-28 RX ADMIN — SODIUM CHLORIDE 10 ML: 9 INJECTION INTRAMUSCULAR; INTRAVENOUS; SUBCUTANEOUS at 09:10

## 2019-08-28 RX ADMIN — DIPHENHYDRAMINE HYDROCHLORIDE 25 MG: 25 CAPSULE ORAL at 09:15

## 2019-08-28 RX ADMIN — ACETAMINOPHEN 650 MG: 325 TABLET, FILM COATED ORAL at 09:15

## 2019-08-28 RX ADMIN — SODIUM CHLORIDE 25 ML/HR: 900 INJECTION, SOLUTION INTRAVENOUS at 10:10

## 2019-08-28 RX ADMIN — SODIUM CHLORIDE 250 ML: 900 INJECTION, SOLUTION INTRAVENOUS at 09:15

## 2019-08-28 RX ADMIN — DECITABINE 40 MG: 50 INJECTION, POWDER, LYOPHILIZED, FOR SOLUTION INTRAVENOUS at 10:30

## 2019-08-29 ENCOUNTER — PATIENT OUTREACH (OUTPATIENT)
Dept: CASE MANAGEMENT | Age: 74
End: 2019-08-29

## 2019-08-29 ENCOUNTER — HOSPITAL ENCOUNTER (OUTPATIENT)
Dept: INFUSION THERAPY | Age: 74
Discharge: HOME | End: 2019-08-29
Payer: MEDICARE

## 2019-08-29 ENCOUNTER — HOSPITAL ENCOUNTER (OUTPATIENT)
Dept: LAB | Age: 74
Discharge: HOME | End: 2019-08-29
Payer: MEDICARE

## 2019-08-29 DIAGNOSIS — C92.00 ACUTE MYELOID LEUKEMIA NOT HAVING ACHIEVED REMISSION (HCC): ICD-10-CM

## 2019-08-29 DIAGNOSIS — C92.00 ACUTE MYELOID LEUKEMIA NOT HAVING ACHIEVED REMISSION (HCC): Primary | ICD-10-CM

## 2019-08-29 LAB
ABO + RH BLD: NORMAL
ALBUMIN SERPL-MCNC: 3.5 G/DL (ref 3.2–4.6)
ALBUMIN/GLOB SERPL: 1.1 {RATIO} (ref 1.2–3.5)
ALP SERPL-CCNC: 104 U/L (ref 50–136)
ALT SERPL-CCNC: 42 U/L (ref 12–65)
ANION GAP SERPL CALC-SCNC: 6 MMOL/L (ref 7–16)
AST SERPL-CCNC: 31 U/L (ref 15–37)
BASOPHILS # BLD: 0 K/UL (ref 0–0.2)
BASOPHILS NFR BLD: 0 % (ref 0–2)
BILIRUB SERPL-MCNC: 0.3 MG/DL (ref 0.2–1.1)
BLD PROD TYP BPU: NORMAL
BLOOD GROUP ANTIBODIES SERPL: NORMAL
BPU ID: NORMAL
BUN SERPL-MCNC: 20 MG/DL (ref 8–23)
CALCIUM SERPL-MCNC: 9.2 MG/DL (ref 8.3–10.4)
CHLORIDE SERPL-SCNC: 108 MMOL/L (ref 98–107)
CO2 SERPL-SCNC: 28 MMOL/L (ref 21–32)
CREAT SERPL-MCNC: 0.95 MG/DL (ref 0.6–1)
DIFFERENTIAL METHOD BLD: ABNORMAL
EOSINOPHIL # BLD: 0 K/UL (ref 0–0.8)
EOSINOPHIL NFR BLD: 4 % (ref 0.5–7.8)
ERYTHROCYTE [DISTWIDTH] IN BLOOD BY AUTOMATED COUNT: 15.9 % (ref 11.9–14.6)
GLOBULIN SER CALC-MCNC: 3.3 G/DL (ref 2.3–3.5)
GLUCOSE SERPL-MCNC: 95 MG/DL (ref 65–100)
HCT VFR BLD AUTO: 24.9 % (ref 35.8–46.3)
HGB BLD-MCNC: 8.3 G/DL (ref 11.7–15.4)
IMM GRANULOCYTES # BLD AUTO: 0 K/UL (ref 0–0.5)
IMM GRANULOCYTES NFR BLD AUTO: 4 % (ref 0–5)
LYMPHOCYTES # BLD: 0.4 K/UL (ref 0.5–4.6)
LYMPHOCYTES NFR BLD: 74 % (ref 13–44)
MAGNESIUM SERPL-MCNC: 2.1 MG/DL (ref 1.8–2.4)
MCH RBC QN AUTO: 30 PG (ref 26.1–32.9)
MCHC RBC AUTO-ENTMCNC: 33.3 G/DL (ref 31.4–35)
MCV RBC AUTO: 89.9 FL (ref 79.6–97.8)
MONOCYTES # BLD: 0 K/UL (ref 0.1–1.3)
MONOCYTES NFR BLD: 4 % (ref 4–12)
NEUTS SEG # BLD: 0.1 K/UL (ref 1.7–8.2)
NEUTS SEG NFR BLD: 14 % (ref 43–78)
NRBC # BLD: 0 K/UL (ref 0–0.2)
PLATELET # BLD AUTO: 52 K/UL (ref 150–450)
PLATELET COMMENTS,PCOM: ABNORMAL
PMV BLD AUTO: 10.4 FL (ref 9.4–12.3)
POTASSIUM SERPL-SCNC: 4.2 MMOL/L (ref 3.5–5.1)
PROT SERPL-MCNC: 6.8 G/DL (ref 6.3–8.2)
RBC # BLD AUTO: 2.77 M/UL (ref 4.05–5.25)
RBC MORPH BLD: ABNORMAL
SODIUM SERPL-SCNC: 142 MMOL/L (ref 136–145)
SPECIMEN EXP DATE BLD: NORMAL
STATUS OF UNIT,%ST: NORMAL
UNIT DIVISION, %UDIV: 0
WBC # BLD AUTO: 0.5 K/UL (ref 4.3–11.1)
WBC MORPH BLD: ABNORMAL

## 2019-08-29 PROCEDURE — 83735 ASSAY OF MAGNESIUM: CPT

## 2019-08-29 PROCEDURE — 96413 CHEMO IV INFUSION 1 HR: CPT

## 2019-08-29 PROCEDURE — 74011250636 HC RX REV CODE- 250/636: Performed by: INTERNAL MEDICINE

## 2019-08-29 PROCEDURE — 74011000258 HC RX REV CODE- 258: Performed by: INTERNAL MEDICINE

## 2019-08-29 PROCEDURE — 96375 TX/PRO/DX INJ NEW DRUG ADDON: CPT

## 2019-08-29 PROCEDURE — 80053 COMPREHEN METABOLIC PANEL: CPT

## 2019-08-29 PROCEDURE — 85025 COMPLETE CBC W/AUTO DIFF WBC: CPT

## 2019-08-29 PROCEDURE — 86900 BLOOD TYPING SEROLOGIC ABO: CPT

## 2019-08-29 RX ORDER — SODIUM CHLORIDE 0.9 % (FLUSH) 0.9 %
10 SYRINGE (ML) INJECTION AS NEEDED
Status: ACTIVE | OUTPATIENT
Start: 2019-08-29 | End: 2019-08-29

## 2019-08-29 RX ORDER — ONDANSETRON 2 MG/ML
8 INJECTION INTRAMUSCULAR; INTRAVENOUS ONCE
Status: COMPLETED | OUTPATIENT
Start: 2019-08-29 | End: 2019-08-29

## 2019-08-29 RX ORDER — SODIUM CHLORIDE 9 MG/ML
25 INJECTION, SOLUTION INTRAVENOUS CONTINUOUS
Status: ACTIVE | OUTPATIENT
Start: 2019-08-29 | End: 2019-08-29

## 2019-08-29 RX ADMIN — Medication 10 ML: at 12:48

## 2019-08-29 RX ADMIN — Medication 10 ML: at 10:53

## 2019-08-29 RX ADMIN — SODIUM CHLORIDE 25 ML/HR: 900 INJECTION, SOLUTION INTRAVENOUS at 10:53

## 2019-08-29 RX ADMIN — DECITABINE 40 MG: 50 INJECTION, POWDER, LYOPHILIZED, FOR SOLUTION INTRAVENOUS at 11:36

## 2019-08-29 RX ADMIN — ONDANSETRON 8 MG: 2 INJECTION INTRAMUSCULAR; INTRAVENOUS at 11:01

## 2019-08-29 NOTE — PROGRESS NOTES
8/29/19:  Patient in for follow-up with NP. Patient receiving dacogen day 4 of 5 today. Counts stable. Patient reports feeling tired this week but mainly because she has to come everyday this week. Patient in very pleasant spirits today. She will have labs and replacements on Monday and see NP on Thursday.

## 2019-08-30 ENCOUNTER — HOSPITAL ENCOUNTER (OUTPATIENT)
Dept: INFUSION THERAPY | Age: 74
Discharge: HOME | End: 2019-08-30
Payer: MEDICARE

## 2019-08-30 VITALS
TEMPERATURE: 98.4 F | HEART RATE: 90 BPM | OXYGEN SATURATION: 97 % | BODY MASS INDEX: 30.57 KG/M2 | SYSTOLIC BLOOD PRESSURE: 119 MMHG | WEIGHT: 189.4 LBS | DIASTOLIC BLOOD PRESSURE: 70 MMHG | RESPIRATION RATE: 18 BRPM

## 2019-08-30 DIAGNOSIS — C92.00 ACUTE MYELOID LEUKEMIA NOT HAVING ACHIEVED REMISSION (HCC): Primary | ICD-10-CM

## 2019-08-30 PROCEDURE — 74011250636 HC RX REV CODE- 250/636: Performed by: INTERNAL MEDICINE

## 2019-08-30 PROCEDURE — 74011000258 HC RX REV CODE- 258: Performed by: INTERNAL MEDICINE

## 2019-08-30 PROCEDURE — 96413 CHEMO IV INFUSION 1 HR: CPT

## 2019-08-30 PROCEDURE — 96375 TX/PRO/DX INJ NEW DRUG ADDON: CPT

## 2019-08-30 RX ORDER — SODIUM CHLORIDE 0.9 % (FLUSH) 0.9 %
10 SYRINGE (ML) INJECTION AS NEEDED
Status: ACTIVE | OUTPATIENT
Start: 2019-08-30 | End: 2019-08-30

## 2019-08-30 RX ORDER — SODIUM CHLORIDE 9 MG/ML
25 INJECTION, SOLUTION INTRAVENOUS CONTINUOUS
Status: ACTIVE | OUTPATIENT
Start: 2019-08-30 | End: 2019-08-30

## 2019-08-30 RX ORDER — ONDANSETRON 2 MG/ML
8 INJECTION INTRAMUSCULAR; INTRAVENOUS ONCE
Status: COMPLETED | OUTPATIENT
Start: 2019-08-30 | End: 2019-08-30

## 2019-08-30 RX ADMIN — ONDANSETRON 8 MG: 2 INJECTION INTRAMUSCULAR; INTRAVENOUS at 10:52

## 2019-08-30 RX ADMIN — Medication 10 ML: at 10:48

## 2019-08-30 RX ADMIN — SODIUM CHLORIDE 25 ML/HR: 900 INJECTION, SOLUTION INTRAVENOUS at 10:48

## 2019-08-30 RX ADMIN — Medication 10 ML: at 12:20

## 2019-08-30 RX ADMIN — DECITABINE 40 MG: 50 INJECTION, POWDER, LYOPHILIZED, FOR SOLUTION INTRAVENOUS at 11:20

## 2019-08-30 NOTE — PROGRESS NOTES
Pt ambulatory to area without complaints. Received chemo treatment per order, tolerated well. Aware of next appt on [email or web address]. Advised to call dr with any issues/concerns. Discharged home without complaints.

## 2019-09-02 ENCOUNTER — HOSPITAL ENCOUNTER (OUTPATIENT)
Dept: INFUSION THERAPY | Age: 74
Discharge: HOME | End: 2019-09-02
Payer: MEDICARE

## 2019-09-02 VITALS
TEMPERATURE: 98.1 F | OXYGEN SATURATION: 99 % | RESPIRATION RATE: 18 BRPM | DIASTOLIC BLOOD PRESSURE: 71 MMHG | HEART RATE: 77 BPM | SYSTOLIC BLOOD PRESSURE: 135 MMHG

## 2019-09-02 LAB
ALBUMIN SERPL-MCNC: 3.4 G/DL (ref 3.2–4.6)
ALBUMIN/GLOB SERPL: 1.1 {RATIO} (ref 1.2–3.5)
ALP SERPL-CCNC: 103 U/L (ref 50–136)
ALT SERPL-CCNC: 36 U/L (ref 12–65)
ANION GAP SERPL CALC-SCNC: 3 MMOL/L (ref 7–16)
AST SERPL-CCNC: 34 U/L (ref 15–37)
BASOPHILS # BLD: 0 K/UL (ref 0–0.2)
BASOPHILS NFR BLD: 0 % (ref 0–2)
BILIRUB SERPL-MCNC: 0.3 MG/DL (ref 0.2–1.1)
BUN SERPL-MCNC: 18 MG/DL (ref 8–23)
CALCIUM SERPL-MCNC: 8.8 MG/DL (ref 8.3–10.4)
CHLORIDE SERPL-SCNC: 110 MMOL/L (ref 98–107)
CO2 SERPL-SCNC: 29 MMOL/L (ref 21–32)
CREAT SERPL-MCNC: 0.94 MG/DL (ref 0.6–1)
DIFFERENTIAL METHOD BLD: ABNORMAL
EOSINOPHIL # BLD: 0 K/UL (ref 0–0.8)
EOSINOPHIL NFR BLD: 1 % (ref 0.5–7.8)
ERYTHROCYTE [DISTWIDTH] IN BLOOD BY AUTOMATED COUNT: 15.9 % (ref 11.9–14.6)
GLOBULIN SER CALC-MCNC: 3.2 G/DL (ref 2.3–3.5)
GLUCOSE SERPL-MCNC: 94 MG/DL (ref 65–100)
HCT VFR BLD AUTO: 23.8 % (ref 35.8–46.3)
HGB BLD-MCNC: 7.9 G/DL (ref 11.7–15.4)
IMM GRANULOCYTES # BLD AUTO: 0 K/UL (ref 0–0.5)
IMM GRANULOCYTES NFR BLD AUTO: 3 % (ref 0–5)
LYMPHOCYTES # BLD: 0.6 K/UL (ref 0.5–4.6)
LYMPHOCYTES NFR BLD: 83 % (ref 13–44)
MAGNESIUM SERPL-MCNC: 2.1 MG/DL (ref 1.8–2.4)
MCH RBC QN AUTO: 29.6 PG (ref 26.1–32.9)
MCHC RBC AUTO-ENTMCNC: 33.2 G/DL (ref 31.4–35)
MCV RBC AUTO: 89.1 FL (ref 79.6–97.8)
MONOCYTES # BLD: 0 K/UL (ref 0.1–1.3)
MONOCYTES NFR BLD: 3 % (ref 4–12)
NEUTS SEG # BLD: 0.1 K/UL (ref 1.7–8.2)
NEUTS SEG NFR BLD: 10 % (ref 43–78)
NRBC # BLD: 0 K/UL (ref 0–0.2)
PLATELET # BLD AUTO: 18 K/UL (ref 150–450)
PLATELET COMMENTS,PCOM: ABNORMAL
PMV BLD AUTO: 8.8 FL (ref 9.4–12.3)
POTASSIUM SERPL-SCNC: 3.9 MMOL/L (ref 3.5–5.1)
PROT SERPL-MCNC: 6.6 G/DL (ref 6.3–8.2)
RBC # BLD AUTO: 2.67 M/UL (ref 4.05–5.2)
RBC MORPH BLD: ABNORMAL
RBC MORPH BLD: ABNORMAL
SODIUM SERPL-SCNC: 142 MMOL/L (ref 136–145)
WBC # BLD AUTO: 0.7 K/UL (ref 4.3–11.1)
WBC MORPH BLD: ABNORMAL

## 2019-09-02 PROCEDURE — 77030018667 ADMN ST IV BLD FENW -A

## 2019-09-02 PROCEDURE — 86644 CMV ANTIBODY: CPT

## 2019-09-02 PROCEDURE — 80053 COMPREHEN METABOLIC PANEL: CPT

## 2019-09-02 PROCEDURE — 83735 ASSAY OF MAGNESIUM: CPT

## 2019-09-02 PROCEDURE — 36430 TRANSFUSION BLD/BLD COMPNT: CPT

## 2019-09-02 PROCEDURE — P9037 PLATE PHERES LEUKOREDU IRRAD: HCPCS

## 2019-09-02 PROCEDURE — 74011250636 HC RX REV CODE- 250/636: Performed by: INTERNAL MEDICINE

## 2019-09-02 PROCEDURE — 85025 COMPLETE CBC W/AUTO DIFF WBC: CPT

## 2019-09-02 PROCEDURE — 74011250637 HC RX REV CODE- 250/637: Performed by: INTERNAL MEDICINE

## 2019-09-02 RX ORDER — DIPHENHYDRAMINE HCL 25 MG
25 CAPSULE ORAL ONCE
Status: COMPLETED | OUTPATIENT
Start: 2019-09-02 | End: 2019-09-02

## 2019-09-02 RX ORDER — SODIUM CHLORIDE 0.9 % (FLUSH) 0.9 %
10-30 SYRINGE (ML) INJECTION AS NEEDED
Status: DISCONTINUED | OUTPATIENT
Start: 2019-09-02 | End: 2019-09-06 | Stop reason: HOSPADM

## 2019-09-02 RX ORDER — ACETAMINOPHEN 325 MG/1
650 TABLET ORAL ONCE
Status: COMPLETED | OUTPATIENT
Start: 2019-09-02 | End: 2019-09-02

## 2019-09-02 RX ORDER — SODIUM CHLORIDE 9 MG/ML
250 INJECTION, SOLUTION INTRAVENOUS AS NEEDED
Status: DISCONTINUED | OUTPATIENT
Start: 2019-09-02 | End: 2019-09-06 | Stop reason: HOSPADM

## 2019-09-02 RX ADMIN — ACETAMINOPHEN 650 MG: 325 TABLET, FILM COATED ORAL at 13:45

## 2019-09-02 RX ADMIN — DIPHENHYDRAMINE HYDROCHLORIDE 25 MG: 25 CAPSULE ORAL at 13:45

## 2019-09-02 RX ADMIN — SODIUM CHLORIDE 250 ML: 900 INJECTION, SOLUTION INTRAVENOUS at 14:00

## 2019-09-02 NOTE — PROGRESS NOTES
Arrived to the [facility]. Labs drawn and reviewed-1 unit of platelets completed. Patient tolerated well.   Any issues or concerns during appointment: No  Patient aware of next infusion appointment on Thursday,September 9th @ 1330  Discharged home ambulatory with

## 2019-09-03 LAB
BLD PROD TYP BPU: NORMAL
BPU ID: NORMAL
STATUS OF UNIT,%ST: NORMAL
UNIT DIVISION, %UDIV: 0

## 2019-09-05 ENCOUNTER — HOSPITAL ENCOUNTER (OUTPATIENT)
Dept: LAB | Age: 74
Discharge: HOME | End: 2019-09-05
Payer: MEDICARE

## 2019-09-05 ENCOUNTER — PATIENT OUTREACH (OUTPATIENT)
Dept: CASE MANAGEMENT | Age: 74
End: 2019-09-05

## 2019-09-05 ENCOUNTER — HOSPITAL ENCOUNTER (OUTPATIENT)
Dept: INFUSION THERAPY | Age: 74
Discharge: HOME | End: 2019-09-05
Payer: MEDICARE

## 2019-09-05 DIAGNOSIS — C92.00 ACUTE MYELOID LEUKEMIA NOT HAVING ACHIEVED REMISSION (HCC): ICD-10-CM

## 2019-09-05 DIAGNOSIS — C92.00 AML (ACUTE MYELOID LEUKEMIA) WITH FAILED REMISSION (HCC): Primary | ICD-10-CM

## 2019-09-05 LAB
ALBUMIN SERPL-MCNC: 3.6 G/DL (ref 3.2–4.6)
ALBUMIN/GLOB SERPL: 1 {RATIO} (ref 1.2–3.5)
ALP SERPL-CCNC: 108 U/L (ref 50–136)
ALT SERPL-CCNC: 46 U/L (ref 12–65)
ANION GAP SERPL CALC-SCNC: 8 MMOL/L (ref 7–16)
AST SERPL-CCNC: 38 U/L (ref 15–37)
BASOPHILS # BLD: 0 K/UL (ref 0–0.2)
BASOPHILS NFR BLD: 0 % (ref 0–2)
BILIRUB SERPL-MCNC: 0.3 MG/DL (ref 0.2–1.1)
BUN SERPL-MCNC: 22 MG/DL (ref 8–23)
CALCIUM SERPL-MCNC: 9.7 MG/DL (ref 8.3–10.4)
CHLORIDE SERPL-SCNC: 107 MMOL/L (ref 98–107)
CO2 SERPL-SCNC: 26 MMOL/L (ref 21–32)
CREAT SERPL-MCNC: 1.2 MG/DL (ref 0.6–1)
DIFFERENTIAL METHOD BLD: ABNORMAL
EOSINOPHIL # BLD: 0 K/UL (ref 0–0.8)
EOSINOPHIL NFR BLD: 3 % (ref 0.5–7.8)
ERYTHROCYTE [DISTWIDTH] IN BLOOD BY AUTOMATED COUNT: 15.9 % (ref 11.9–14.6)
GLOBULIN SER CALC-MCNC: 3.6 G/DL (ref 2.3–3.5)
GLUCOSE SERPL-MCNC: 83 MG/DL (ref 65–100)
HCT VFR BLD AUTO: 22.4 % (ref 35.8–46.3)
HGB BLD-MCNC: 7.6 G/DL (ref 11.7–15.4)
IMM GRANULOCYTES # BLD AUTO: 0 K/UL (ref 0–0.5)
IMM GRANULOCYTES NFR BLD AUTO: 2 % (ref 0–5)
LYMPHOCYTES # BLD: 0.6 K/UL (ref 0.5–4.6)
LYMPHOCYTES NFR BLD: 84 % (ref 13–44)
MAGNESIUM SERPL-MCNC: 2.2 MG/DL (ref 1.8–2.4)
MCH RBC QN AUTO: 29.7 PG (ref 26.1–32.9)
MCHC RBC AUTO-ENTMCNC: 33.9 G/DL (ref 31.4–35)
MCV RBC AUTO: 87.5 FL (ref 79.6–97.8)
MONOCYTES # BLD: 0 K/UL (ref 0.1–1.3)
MONOCYTES NFR BLD: 3 % (ref 4–12)
NEUTS SEG # BLD: 0.1 K/UL (ref 1.7–8.2)
NEUTS SEG NFR BLD: 8 % (ref 43–78)
NRBC # BLD: 0 K/UL (ref 0–0.2)
PLATELET # BLD AUTO: 30 K/UL (ref 150–450)
PLATELET COMMENTS,PCOM: ABNORMAL
PMV BLD AUTO: 9.7 FL (ref 9.4–12.3)
POTASSIUM SERPL-SCNC: 4.2 MMOL/L (ref 3.5–5.1)
PROT SERPL-MCNC: 7.2 G/DL (ref 6.3–8.2)
RBC # BLD AUTO: 2.56 M/UL (ref 4.05–5.25)
RBC MORPH BLD: ABNORMAL
SODIUM SERPL-SCNC: 141 MMOL/L (ref 136–145)
WBC # BLD AUTO: 0.7 K/UL (ref 4.3–11.1)
WBC MORPH BLD: ABNORMAL

## 2019-09-05 PROCEDURE — 85025 COMPLETE CBC W/AUTO DIFF WBC: CPT

## 2019-09-05 PROCEDURE — 86900 BLOOD TYPING SEROLOGIC ABO: CPT

## 2019-09-05 PROCEDURE — 83735 ASSAY OF MAGNESIUM: CPT

## 2019-09-05 PROCEDURE — 86923 COMPATIBILITY TEST ELECTRIC: CPT

## 2019-09-05 PROCEDURE — 80053 COMPREHEN METABOLIC PANEL: CPT

## 2019-09-05 NOTE — PROGRESS NOTES
9/5/19:  Patient in for follow-up with NP. Saad Graf NP reviewed labs and assessed the patient. Patient reports dizziness at home this am that lingered for over an hour. Hgb dropped some to 7.6. Offered patient IVF today but she would rather wait until blood tomorrow. Patient to return in the am for blood and then labs and replacements on Monday. She will see Dr. Radha Wiley on 9/12 next week.

## 2019-09-06 ENCOUNTER — HOSPITAL ENCOUNTER (OUTPATIENT)
Dept: INFUSION THERAPY | Age: 74
Discharge: HOME | End: 2019-09-06
Payer: MEDICARE

## 2019-09-06 VITALS
OXYGEN SATURATION: 98 % | DIASTOLIC BLOOD PRESSURE: 67 MMHG | HEART RATE: 75 BPM | SYSTOLIC BLOOD PRESSURE: 137 MMHG | RESPIRATION RATE: 18 BRPM | TEMPERATURE: 98.9 F

## 2019-09-06 DIAGNOSIS — C92.00 ACUTE MYELOID LEUKEMIA NOT HAVING ACHIEVED REMISSION (HCC): ICD-10-CM

## 2019-09-06 PROCEDURE — P9040 RBC LEUKOREDUCED IRRADIATED: HCPCS

## 2019-09-06 PROCEDURE — 36430 TRANSFUSION BLD/BLD COMPNT: CPT

## 2019-09-06 PROCEDURE — 86644 CMV ANTIBODY: CPT

## 2019-09-06 PROCEDURE — 77030018667 ADMN ST IV BLD FENW -A

## 2019-09-06 PROCEDURE — 74011250636 HC RX REV CODE- 250/636: Performed by: NURSE PRACTITIONER

## 2019-09-06 PROCEDURE — 74011250637 HC RX REV CODE- 250/637: Performed by: NURSE PRACTITIONER

## 2019-09-06 RX ORDER — EPINEPHRINE 1 MG/ML
0.3 INJECTION, SOLUTION, CONCENTRATE INTRAVENOUS AS NEEDED
Status: DISPENSED | OUTPATIENT
Start: 2019-09-06 | End: 2019-09-06

## 2019-09-06 RX ORDER — SODIUM CHLORIDE 0.9 % (FLUSH) 0.9 %
10 SYRINGE (ML) INJECTION AS NEEDED
Status: DISCONTINUED | OUTPATIENT
Start: 2019-09-06 | End: 2019-09-10 | Stop reason: HOSPADM

## 2019-09-06 RX ORDER — SODIUM CHLORIDE 9 MG/ML
250 INJECTION, SOLUTION INTRAVENOUS AS NEEDED
Status: DISCONTINUED | OUTPATIENT
Start: 2019-09-06 | End: 2019-09-10 | Stop reason: HOSPADM

## 2019-09-06 RX ORDER — DIPHENHYDRAMINE HYDROCHLORIDE 50 MG/ML
50 INJECTION, SOLUTION INTRAMUSCULAR; INTRAVENOUS AS NEEDED
Status: ACTIVE | OUTPATIENT
Start: 2019-09-06 | End: 2019-09-06

## 2019-09-06 RX ORDER — HYDROCORTISONE SODIUM SUCCINATE 100 MG/2ML
100 INJECTION, POWDER, FOR SOLUTION INTRAMUSCULAR; INTRAVENOUS AS NEEDED
Status: ACTIVE | OUTPATIENT
Start: 2019-09-06 | End: 2019-09-06

## 2019-09-06 RX ORDER — ACETAMINOPHEN 325 MG/1
650 TABLET ORAL ONCE
Status: COMPLETED | OUTPATIENT
Start: 2019-09-06 | End: 2019-09-06

## 2019-09-06 RX ORDER — ACETAMINOPHEN 325 MG/1
650 TABLET ORAL AS NEEDED
Status: ACTIVE | OUTPATIENT
Start: 2019-09-06 | End: 2019-09-06

## 2019-09-06 RX ORDER — ONDANSETRON 2 MG/ML
8 INJECTION INTRAMUSCULAR; INTRAVENOUS AS NEEDED
Status: ACTIVE | OUTPATIENT
Start: 2019-09-06 | End: 2019-09-06

## 2019-09-06 RX ORDER — ALBUTEROL SULFATE 0.83 MG/ML
2.5 SOLUTION RESPIRATORY (INHALATION) AS NEEDED
Status: DISPENSED | OUTPATIENT
Start: 2019-09-06 | End: 2019-09-06

## 2019-09-06 RX ORDER — DIPHENHYDRAMINE HCL 25 MG
25 CAPSULE ORAL
Status: DISPENSED | OUTPATIENT
Start: 2019-09-06 | End: 2019-09-06

## 2019-09-06 RX ADMIN — SODIUM CHLORIDE 250 ML: 900 INJECTION, SOLUTION INTRAVENOUS at 09:00

## 2019-09-06 RX ADMIN — Medication 10 ML: at 11:03

## 2019-09-06 RX ADMIN — DIPHENHYDRAMINE HYDROCHLORIDE 25 MG: 25 CAPSULE ORAL at 09:00

## 2019-09-06 RX ADMIN — ACETAMINOPHEN 650 MG: 325 TABLET, FILM COATED ORAL at 09:00

## 2019-09-06 NOTE — PROGRESS NOTES
Arrived to the [facility]. 1 unit PRBC  completed. Patient tolerated without problems. Any issues or concerns during appointment: no.  Patient aware of next infusion appointment on 9/9/19 (date) at 36 (time). Discharged ambulatory with spouse.

## 2019-09-07 LAB
ABO + RH BLD: NORMAL
BLD PROD TYP BPU: NORMAL
BLOOD GROUP ANTIBODIES SERPL: NORMAL
BPU ID: NORMAL
CROSSMATCH RESULT,%XM: NORMAL
SPECIMEN EXP DATE BLD: NORMAL
STATUS OF UNIT,%ST: NORMAL
UNIT DIVISION, %UDIV: 0

## 2019-09-09 ENCOUNTER — HOSPITAL ENCOUNTER (OUTPATIENT)
Dept: INFUSION THERAPY | Age: 74
Discharge: HOME | End: 2019-09-09
Payer: MEDICARE

## 2019-09-09 VITALS
RESPIRATION RATE: 18 BRPM | TEMPERATURE: 98.2 F | HEART RATE: 79 BPM | SYSTOLIC BLOOD PRESSURE: 129 MMHG | OXYGEN SATURATION: 98 % | BODY MASS INDEX: 30.47 KG/M2 | DIASTOLIC BLOOD PRESSURE: 66 MMHG | WEIGHT: 188.8 LBS

## 2019-09-09 LAB
ALBUMIN SERPL-MCNC: 3.5 G/DL (ref 3.2–4.6)
ALBUMIN/GLOB SERPL: 1 {RATIO} (ref 1.2–3.5)
ALP SERPL-CCNC: 111 U/L (ref 50–136)
ALT SERPL-CCNC: 40 U/L (ref 12–65)
ANION GAP SERPL CALC-SCNC: 6 MMOL/L (ref 7–16)
AST SERPL-CCNC: 31 U/L (ref 15–37)
BASOPHILS # BLD: 0 K/UL (ref 0–0.2)
BASOPHILS NFR BLD: 0 % (ref 0–2)
BILIRUB SERPL-MCNC: 0.3 MG/DL (ref 0.2–1.1)
BUN SERPL-MCNC: 24 MG/DL (ref 8–23)
CALCIUM SERPL-MCNC: 8.7 MG/DL (ref 8.3–10.4)
CHLORIDE SERPL-SCNC: 110 MMOL/L (ref 98–107)
CO2 SERPL-SCNC: 27 MMOL/L (ref 21–32)
CREAT SERPL-MCNC: 1.11 MG/DL (ref 0.6–1)
DIFFERENTIAL METHOD BLD: ABNORMAL
EOSINOPHIL # BLD: 0 K/UL (ref 0–0.8)
EOSINOPHIL NFR BLD: 0 % (ref 0.5–7.8)
ERYTHROCYTE [DISTWIDTH] IN BLOOD BY AUTOMATED COUNT: 15 % (ref 11.9–14.6)
GLOBULIN SER CALC-MCNC: 3.5 G/DL (ref 2.3–3.5)
GLUCOSE SERPL-MCNC: 93 MG/DL (ref 65–100)
HCT VFR BLD AUTO: 25.4 % (ref 35.8–46.3)
HGB BLD-MCNC: 8.6 G/DL (ref 11.7–15.4)
IMM GRANULOCYTES # BLD AUTO: 0 K/UL (ref 0–0.5)
IMM GRANULOCYTES NFR BLD AUTO: 2 % (ref 0–5)
LYMPHOCYTES # BLD: 0.6 K/UL (ref 0.5–4.6)
LYMPHOCYTES NFR BLD: 85 % (ref 13–44)
MAGNESIUM SERPL-MCNC: 1.8 MG/DL (ref 1.8–2.4)
MCH RBC QN AUTO: 29.6 PG (ref 26.1–32.9)
MCHC RBC AUTO-ENTMCNC: 33.9 G/DL (ref 31.4–35)
MCV RBC AUTO: 87.3 FL (ref 79.6–97.8)
MONOCYTES # BLD: 0 K/UL (ref 0.1–1.3)
MONOCYTES NFR BLD: 5 % (ref 4–12)
NEUTS SEG # BLD: 0 K/UL (ref 1.7–8.2)
NEUTS SEG NFR BLD: 8 % (ref 43–78)
NRBC # BLD: 0 K/UL (ref 0–0.2)
PLATELET # BLD AUTO: 7 K/UL (ref 150–450)
PLATELET COMMENTS,PCOM: ABNORMAL
PMV BLD AUTO: ABNORMAL FL (ref 9.4–12.3)
POTASSIUM SERPL-SCNC: 4.1 MMOL/L (ref 3.5–5.1)
PROT SERPL-MCNC: 7 G/DL (ref 6.3–8.2)
RBC # BLD AUTO: 2.91 M/UL (ref 4.05–5.25)
RBC MORPH BLD: ABNORMAL
SODIUM SERPL-SCNC: 143 MMOL/L (ref 136–145)
WBC # BLD AUTO: 0.6 K/UL (ref 4.3–11.1)
WBC MORPH BLD: ABNORMAL

## 2019-09-09 PROCEDURE — 96360 HYDRATION IV INFUSION INIT: CPT

## 2019-09-09 PROCEDURE — 83735 ASSAY OF MAGNESIUM: CPT

## 2019-09-09 PROCEDURE — 74011250636 HC RX REV CODE- 250/636: Performed by: INTERNAL MEDICINE

## 2019-09-09 PROCEDURE — 85025 COMPLETE CBC W/AUTO DIFF WBC: CPT

## 2019-09-09 PROCEDURE — 74011250637 HC RX REV CODE- 250/637: Performed by: INTERNAL MEDICINE

## 2019-09-09 PROCEDURE — 36430 TRANSFUSION BLD/BLD COMPNT: CPT

## 2019-09-09 PROCEDURE — P9037 PLATE PHERES LEUKOREDU IRRAD: HCPCS

## 2019-09-09 PROCEDURE — 77030018667 ADMN ST IV BLD FENW -A

## 2019-09-09 PROCEDURE — 86644 CMV ANTIBODY: CPT

## 2019-09-09 PROCEDURE — 80053 COMPREHEN METABOLIC PANEL: CPT

## 2019-09-09 RX ORDER — DIPHENHYDRAMINE HCL 25 MG
25 CAPSULE ORAL ONCE
Status: COMPLETED | OUTPATIENT
Start: 2019-09-09 | End: 2019-09-09

## 2019-09-09 RX ORDER — ACETAMINOPHEN 325 MG/1
650 TABLET ORAL ONCE
Status: COMPLETED | OUTPATIENT
Start: 2019-09-09 | End: 2019-09-09

## 2019-09-09 RX ORDER — SODIUM CHLORIDE 9 MG/ML
250 INJECTION, SOLUTION INTRAVENOUS AS NEEDED
Status: DISCONTINUED | OUTPATIENT
Start: 2019-09-09 | End: 2019-09-13 | Stop reason: HOSPADM

## 2019-09-09 RX ORDER — SODIUM CHLORIDE 0.9 % (FLUSH) 0.9 %
5-10 SYRINGE (ML) INJECTION AS NEEDED
Status: DISCONTINUED | OUTPATIENT
Start: 2019-09-09 | End: 2019-09-13 | Stop reason: HOSPADM

## 2019-09-09 RX ADMIN — Medication 10 ML: at 11:18

## 2019-09-09 RX ADMIN — SODIUM CHLORIDE 250 ML: 900 INJECTION, SOLUTION INTRAVENOUS at 13:10

## 2019-09-09 RX ADMIN — DIPHENHYDRAMINE HYDROCHLORIDE 25 MG: 25 CAPSULE ORAL at 13:07

## 2019-09-09 RX ADMIN — SODIUM CHLORIDE 1000 ML: 900 INJECTION, SOLUTION INTRAVENOUS at 11:18

## 2019-09-09 RX ADMIN — ACETAMINOPHEN 650 MG: 325 TABLET, FILM COATED ORAL at 13:07

## 2019-09-09 RX ADMIN — Medication 10 ML: at 14:43

## 2019-09-09 NOTE — PROGRESS NOTES
Pt arrived ambulatory to [facility]. Pt hypotensive. 1 L ns ordered and infusing. Plt 7 read back and confirmed with lab. 1unit platelets ordered. NS KVO. Tylenol and benadryl given po as ordered. 1 unit of platelets infused/ Pt aware of next appt on 9/12/19 at 1130. Port flushed and de accessed. Discharged ambulatory.

## 2019-09-10 LAB
BLD PROD TYP BPU: NORMAL
BPU ID: NORMAL
STATUS OF UNIT,%ST: NORMAL
UNIT DIVISION, %UDIV: 0

## 2019-09-12 ENCOUNTER — HOSPITAL ENCOUNTER (OUTPATIENT)
Dept: LAB | Age: 74
Discharge: HOME | End: 2019-09-12
Payer: MEDICARE

## 2019-09-12 ENCOUNTER — HOSPITAL ENCOUNTER (OUTPATIENT)
Dept: INFUSION THERAPY | Age: 74
Discharge: HOME | End: 2019-09-12
Payer: MEDICARE

## 2019-09-12 ENCOUNTER — PATIENT OUTREACH (OUTPATIENT)
Dept: CASE MANAGEMENT | Age: 74
End: 2019-09-12

## 2019-09-12 VITALS
HEART RATE: 75 BPM | DIASTOLIC BLOOD PRESSURE: 65 MMHG | RESPIRATION RATE: 18 BRPM | SYSTOLIC BLOOD PRESSURE: 132 MMHG | TEMPERATURE: 98.2 F | OXYGEN SATURATION: 100 %

## 2019-09-12 DIAGNOSIS — C92.00 AML (ACUTE MYELOID LEUKEMIA) WITH FAILED REMISSION (HCC): ICD-10-CM

## 2019-09-12 DIAGNOSIS — C92.00 ACUTE MYELOID LEUKEMIA NOT HAVING ACHIEVED REMISSION (HCC): Primary | ICD-10-CM

## 2019-09-12 LAB
ALBUMIN SERPL-MCNC: 3.3 G/DL (ref 3.2–4.6)
ALBUMIN/GLOB SERPL: 1 {RATIO} (ref 1.2–3.5)
ALP SERPL-CCNC: 101 U/L (ref 50–136)
ALT SERPL-CCNC: 37 U/L (ref 12–65)
ANION GAP SERPL CALC-SCNC: 10 MMOL/L (ref 7–16)
AST SERPL-CCNC: 31 U/L (ref 15–37)
BASOPHILS # BLD: 0 K/UL (ref 0–0.2)
BASOPHILS NFR BLD: 0 % (ref 0–2)
BILIRUB SERPL-MCNC: 0.3 MG/DL (ref 0.2–1.1)
BUN SERPL-MCNC: 19 MG/DL (ref 8–23)
CALCIUM SERPL-MCNC: 8.9 MG/DL (ref 8.3–10.4)
CHLORIDE SERPL-SCNC: 110 MMOL/L (ref 98–107)
CO2 SERPL-SCNC: 22 MMOL/L (ref 21–32)
CREAT SERPL-MCNC: 1.06 MG/DL (ref 0.6–1)
DIFFERENTIAL METHOD BLD: ABNORMAL
EOSINOPHIL # BLD: 0 K/UL (ref 0–0.8)
EOSINOPHIL NFR BLD: 2 % (ref 0.5–7.8)
ERYTHROCYTE [DISTWIDTH] IN BLOOD BY AUTOMATED COUNT: 15 % (ref 11.9–14.6)
GLOBULIN SER CALC-MCNC: 3.3 G/DL (ref 2.3–3.5)
GLUCOSE SERPL-MCNC: 143 MG/DL (ref 65–100)
HCT VFR BLD AUTO: 21.8 % (ref 35.8–46.3)
HGB BLD-MCNC: 7.3 G/DL (ref 11.7–15.4)
IMM GRANULOCYTES # BLD AUTO: 0 K/UL (ref 0–0.5)
IMM GRANULOCYTES NFR BLD AUTO: 0 % (ref 0–5)
LYMPHOCYTES # BLD: 0.4 K/UL (ref 0.5–4.6)
LYMPHOCYTES NFR BLD: 84 % (ref 13–44)
MAGNESIUM SERPL-MCNC: 2 MG/DL (ref 1.8–2.4)
MCH RBC QN AUTO: 29.4 PG (ref 26.1–32.9)
MCHC RBC AUTO-ENTMCNC: 33.5 G/DL (ref 31.4–35)
MCV RBC AUTO: 87.9 FL (ref 79.6–97.8)
MONOCYTES # BLD: 0 K/UL (ref 0.1–1.3)
MONOCYTES NFR BLD: 4 % (ref 4–12)
NEUTS SEG # BLD: 0.1 K/UL (ref 1.7–8.2)
NEUTS SEG NFR BLD: 10 % (ref 43–78)
NRBC # BLD: 0 K/UL (ref 0–0.2)
PLATELET # BLD AUTO: 31 K/UL (ref 150–450)
PLATELET COMMENTS,PCOM: ABNORMAL
PMV BLD AUTO: 9.6 FL (ref 9.4–12.3)
POTASSIUM SERPL-SCNC: 3.8 MMOL/L (ref 3.5–5.1)
PROT SERPL-MCNC: 6.6 G/DL (ref 6.3–8.2)
RBC # BLD AUTO: 2.48 M/UL (ref 4.05–5.25)
RBC MORPH BLD: ABNORMAL
SODIUM SERPL-SCNC: 142 MMOL/L (ref 136–145)
WBC # BLD AUTO: 0.5 K/UL (ref 4.3–11.1)
WBC MORPH BLD: ABNORMAL

## 2019-09-12 PROCEDURE — 74011250636 HC RX REV CODE- 250/636: Performed by: INTERNAL MEDICINE

## 2019-09-12 PROCEDURE — 36430 TRANSFUSION BLD/BLD COMPNT: CPT

## 2019-09-12 PROCEDURE — 85025 COMPLETE CBC W/AUTO DIFF WBC: CPT

## 2019-09-12 PROCEDURE — 77030018667 ADMN ST IV BLD FENW -A

## 2019-09-12 PROCEDURE — P9040 RBC LEUKOREDUCED IRRADIATED: HCPCS

## 2019-09-12 PROCEDURE — 86644 CMV ANTIBODY: CPT

## 2019-09-12 PROCEDURE — 83735 ASSAY OF MAGNESIUM: CPT

## 2019-09-12 PROCEDURE — 96361 HYDRATE IV INFUSION ADD-ON: CPT

## 2019-09-12 PROCEDURE — 86900 BLOOD TYPING SEROLOGIC ABO: CPT

## 2019-09-12 PROCEDURE — 96360 HYDRATION IV INFUSION INIT: CPT

## 2019-09-12 PROCEDURE — 36415 COLL VENOUS BLD VENIPUNCTURE: CPT

## 2019-09-12 PROCEDURE — 86923 COMPATIBILITY TEST ELECTRIC: CPT

## 2019-09-12 PROCEDURE — 74011250637 HC RX REV CODE- 250/637: Performed by: INTERNAL MEDICINE

## 2019-09-12 PROCEDURE — 80053 COMPREHEN METABOLIC PANEL: CPT

## 2019-09-12 RX ORDER — SODIUM CHLORIDE 9 MG/ML
999 INJECTION, SOLUTION INTRAVENOUS ONCE
Status: COMPLETED | OUTPATIENT
Start: 2019-09-12 | End: 2019-09-12

## 2019-09-12 RX ORDER — ACETAMINOPHEN 325 MG/1
650 TABLET ORAL ONCE
Status: COMPLETED | OUTPATIENT
Start: 2019-09-12 | End: 2019-09-12

## 2019-09-12 RX ORDER — SODIUM CHLORIDE 0.9 % (FLUSH) 0.9 %
10-30 SYRINGE (ML) INJECTION AS NEEDED
Status: DISCONTINUED | OUTPATIENT
Start: 2019-09-12 | End: 2019-09-16 | Stop reason: HOSPADM

## 2019-09-12 RX ORDER — SODIUM CHLORIDE 9 MG/ML
250 INJECTION, SOLUTION INTRAVENOUS AS NEEDED
Status: DISCONTINUED | OUTPATIENT
Start: 2019-09-12 | End: 2019-09-16 | Stop reason: HOSPADM

## 2019-09-12 RX ORDER — DIPHENHYDRAMINE HCL 25 MG
25 CAPSULE ORAL
Status: DISCONTINUED | OUTPATIENT
Start: 2019-09-12 | End: 2019-09-16 | Stop reason: HOSPADM

## 2019-09-12 RX ADMIN — Medication 10 ML: at 11:36

## 2019-09-12 RX ADMIN — ACETAMINOPHEN 650 MG: 325 TABLET, FILM COATED ORAL at 13:20

## 2019-09-12 RX ADMIN — SODIUM CHLORIDE 250 ML: 900 INJECTION, SOLUTION INTRAVENOUS at 13:44

## 2019-09-12 RX ADMIN — Medication 10 ML: at 15:28

## 2019-09-12 RX ADMIN — DIPHENHYDRAMINE HYDROCHLORIDE 25 MG: 25 CAPSULE ORAL at 13:20

## 2019-09-12 RX ADMIN — SODIUM CHLORIDE 1000 ML: 900 INJECTION, SOLUTION INTRAVENOUS at 11:37

## 2019-09-12 NOTE — PROGRESS NOTES
9/12/19:  Patient in for follow-up with Dr. Sergio Sierra. Patient had syncopal episode this am while having a bowel movement. Patient fell over and hit the bridge of her nose on a trash can. Large bruised area on nose and middle of forehead. Patient reports feeling fine now except weak. Dr. Sergio Sierra would like the patient to receive 1 liter of IVF and 1 unit PRBC today. Patient to have EKG as well. Patient to return next week for labs and replacements and then bone marrow biopsy later in the week.

## 2019-09-12 NOTE — PROGRESS NOTES
Arrived to the [facility]. Assessment complete. Unit of PRBC and one liter of NS completed. Patient tolerated without problems. Any issues or concerns during appointment: None. Patient aware of next infusion appointment on 9/16/2019 (date) at 0900 (time). Discharged ambulatory via walker with .

## 2019-09-13 ENCOUNTER — HOSPITAL ENCOUNTER (OUTPATIENT)
Dept: NON INVASIVE DIAGNOSTICS | Age: 74
Discharge: HOME | End: 2019-09-13
Attending: INTERNAL MEDICINE
Payer: MEDICARE

## 2019-09-13 DIAGNOSIS — C92.00 AML (ACUTE MYELOID LEUKEMIA) WITH FAILED REMISSION (HCC): ICD-10-CM

## 2019-09-13 LAB
ABO + RH BLD: NORMAL
ATRIAL RATE: 74 BPM
BLD PROD TYP BPU: NORMAL
BLOOD GROUP ANTIBODIES SERPL: NORMAL
BPU ID: NORMAL
CALCULATED P AXIS, ECG09: 75 DEGREES
CALCULATED R AXIS, ECG10: -36 DEGREES
CALCULATED T AXIS, ECG11: 63 DEGREES
CROSSMATCH RESULT,%XM: NORMAL
DIAGNOSIS, 93000: NORMAL
P-R INTERVAL, ECG05: 198 MS
Q-T INTERVAL, ECG07: 390 MS
QRS DURATION, ECG06: 102 MS
QTC CALCULATION (BEZET), ECG08: 432 MS
SPECIMEN EXP DATE BLD: NORMAL
STATUS OF UNIT,%ST: NORMAL
UNIT DIVISION, %UDIV: 0
VENTRICULAR RATE, ECG03: 74 BPM

## 2019-09-13 PROCEDURE — 93005 ELECTROCARDIOGRAM TRACING: CPT | Performed by: INTERNAL MEDICINE

## 2019-09-16 ENCOUNTER — HOSPITAL ENCOUNTER (OUTPATIENT)
Dept: INFUSION THERAPY | Age: 74
Discharge: HOME | End: 2019-09-16
Payer: MEDICARE

## 2019-09-16 VITALS
OXYGEN SATURATION: 97 % | RESPIRATION RATE: 18 BRPM | SYSTOLIC BLOOD PRESSURE: 129 MMHG | BODY MASS INDEX: 31.05 KG/M2 | WEIGHT: 192.4 LBS | HEART RATE: 84 BPM | TEMPERATURE: 98.8 F | DIASTOLIC BLOOD PRESSURE: 58 MMHG

## 2019-09-16 DIAGNOSIS — C92.00 ACUTE MYELOID LEUKEMIA NOT HAVING ACHIEVED REMISSION (HCC): Primary | ICD-10-CM

## 2019-09-16 LAB
ALBUMIN SERPL-MCNC: 3.1 G/DL (ref 3.2–4.6)
ALBUMIN/GLOB SERPL: 0.9 {RATIO} (ref 1.2–3.5)
ALP SERPL-CCNC: 103 U/L (ref 50–136)
ALT SERPL-CCNC: 32 U/L (ref 12–65)
ANION GAP SERPL CALC-SCNC: 8 MMOL/L (ref 7–16)
AST SERPL-CCNC: 27 U/L (ref 15–37)
BASOPHILS # BLD: 0 K/UL (ref 0–0.2)
BASOPHILS NFR BLD: 0 % (ref 0–2)
BILIRUB SERPL-MCNC: 0.4 MG/DL (ref 0.2–1.1)
BUN SERPL-MCNC: 16 MG/DL (ref 8–23)
CALCIUM SERPL-MCNC: 8.9 MG/DL (ref 8.3–10.4)
CHLORIDE SERPL-SCNC: 108 MMOL/L (ref 98–107)
CO2 SERPL-SCNC: 28 MMOL/L (ref 21–32)
CREAT SERPL-MCNC: 1.11 MG/DL (ref 0.6–1)
DIFFERENTIAL METHOD BLD: ABNORMAL
EOSINOPHIL # BLD: 0 K/UL (ref 0–0.8)
EOSINOPHIL NFR BLD: 3 % (ref 0.5–7.8)
ERYTHROCYTE [DISTWIDTH] IN BLOOD BY AUTOMATED COUNT: 14.9 % (ref 11.9–14.6)
GLOBULIN SER CALC-MCNC: 3.6 G/DL (ref 2.3–3.5)
GLUCOSE SERPL-MCNC: 109 MG/DL (ref 65–100)
HCT VFR BLD AUTO: 23.2 % (ref 35.8–46.3)
HGB BLD-MCNC: 7.8 G/DL (ref 11.7–15.4)
IMM GRANULOCYTES # BLD AUTO: 0 K/UL (ref 0–0.5)
IMM GRANULOCYTES NFR BLD AUTO: 0 % (ref 0–5)
LYMPHOCYTES # BLD: 0.6 K/UL (ref 0.5–4.6)
LYMPHOCYTES NFR BLD: 85 % (ref 13–44)
MAGNESIUM SERPL-MCNC: 1.8 MG/DL (ref 1.8–2.4)
MCH RBC QN AUTO: 29.2 PG (ref 26.1–32.9)
MCHC RBC AUTO-ENTMCNC: 33.6 G/DL (ref 31.4–35)
MCV RBC AUTO: 86.9 FL (ref 79.6–97.8)
MONOCYTES # BLD: 0 K/UL (ref 0.1–1.3)
MONOCYTES NFR BLD: 7 % (ref 4–12)
NEUTS SEG # BLD: 0 K/UL (ref 1.7–8.2)
NEUTS SEG NFR BLD: 5 % (ref 43–78)
NRBC # BLD: 0 K/UL (ref 0–0.2)
PLATELET # BLD AUTO: 6 K/UL (ref 150–450)
PLATELET COMMENTS,PCOM: ABNORMAL
PMV BLD AUTO: ABNORMAL FL (ref 9.4–12.3)
POTASSIUM SERPL-SCNC: 3.8 MMOL/L (ref 3.5–5.1)
PROT SERPL-MCNC: 6.7 G/DL (ref 6.3–8.2)
RBC # BLD AUTO: 2.67 M/UL (ref 4.05–5.25)
RBC MORPH BLD: ABNORMAL
RBC MORPH BLD: ABNORMAL
SODIUM SERPL-SCNC: 144 MMOL/L (ref 136–145)
WBC # BLD AUTO: 0.6 K/UL (ref 4.3–11.1)
WBC MORPH BLD: ABNORMAL

## 2019-09-16 PROCEDURE — 86644 CMV ANTIBODY: CPT

## 2019-09-16 PROCEDURE — 96360 HYDRATION IV INFUSION INIT: CPT

## 2019-09-16 PROCEDURE — 80053 COMPREHEN METABOLIC PANEL: CPT

## 2019-09-16 PROCEDURE — 74011250636 HC RX REV CODE- 250/636: Performed by: INTERNAL MEDICINE

## 2019-09-16 PROCEDURE — 85025 COMPLETE CBC W/AUTO DIFF WBC: CPT

## 2019-09-16 PROCEDURE — 96361 HYDRATE IV INFUSION ADD-ON: CPT

## 2019-09-16 PROCEDURE — 83735 ASSAY OF MAGNESIUM: CPT

## 2019-09-16 PROCEDURE — 77030018667 ADMN ST IV BLD FENW -A

## 2019-09-16 PROCEDURE — 74011250637 HC RX REV CODE- 250/637: Performed by: INTERNAL MEDICINE

## 2019-09-16 PROCEDURE — 36430 TRANSFUSION BLD/BLD COMPNT: CPT

## 2019-09-16 PROCEDURE — P9037 PLATE PHERES LEUKOREDU IRRAD: HCPCS

## 2019-09-16 RX ORDER — SODIUM CHLORIDE 9 MG/ML
250 INJECTION, SOLUTION INTRAVENOUS AS NEEDED
Status: DISCONTINUED | OUTPATIENT
Start: 2019-09-16 | End: 2019-09-20 | Stop reason: HOSPADM

## 2019-09-16 RX ORDER — ACETAMINOPHEN 325 MG/1
650 TABLET ORAL ONCE
Status: COMPLETED | OUTPATIENT
Start: 2019-09-16 | End: 2019-09-16

## 2019-09-16 RX ORDER — SODIUM CHLORIDE 9 MG/ML
1000 INJECTION, SOLUTION INTRAVENOUS ONCE
Status: COMPLETED | OUTPATIENT
Start: 2019-09-16 | End: 2019-09-16

## 2019-09-16 RX ORDER — DIPHENHYDRAMINE HCL 25 MG
25 CAPSULE ORAL ONCE
Status: COMPLETED | OUTPATIENT
Start: 2019-09-16 | End: 2019-09-16

## 2019-09-16 RX ADMIN — SODIUM CHLORIDE 1000 ML: 900 INJECTION, SOLUTION INTRAVENOUS at 10:00

## 2019-09-16 RX ADMIN — DIPHENHYDRAMINE HYDROCHLORIDE 25 MG: 25 CAPSULE ORAL at 12:13

## 2019-09-16 RX ADMIN — ACETAMINOPHEN 650 MG: 325 TABLET, FILM COATED ORAL at 12:13

## 2019-09-16 RX ADMIN — SODIUM CHLORIDE 250 ML: 900 INJECTION, SOLUTION INTRAVENOUS at 12:05

## 2019-09-16 NOTE — PROGRESS NOTES
Arrived to the [facility]. NS IVF and 1 unit platelet infusions completed. Patient tolerated well. Any issues or concerns during appointment: NO. Reviewed neutropenic precautions with pt. Patient aware of next infusion appointment on 09/19/19 (date) at 0830 (time). Discharged ambulatory with family.

## 2019-09-17 LAB
BLD PROD TYP BPU: NORMAL
BLOOD BANK CMNT PATIENT-IMP: NORMAL
BPU ID: NORMAL
STATUS OF UNIT,%ST: NORMAL
UNIT DIVISION, %UDIV: 0

## 2019-09-19 ENCOUNTER — PATIENT OUTREACH (OUTPATIENT)
Dept: CASE MANAGEMENT | Age: 74
End: 2019-09-19

## 2019-09-19 ENCOUNTER — HOSPITAL ENCOUNTER (OUTPATIENT)
Dept: INFUSION THERAPY | Age: 74
Discharge: HOME | End: 2019-09-19
Payer: MEDICARE

## 2019-09-19 ENCOUNTER — HOSPITAL ENCOUNTER (OUTPATIENT)
Dept: CT IMAGING | Age: 74
Discharge: HOME | DRG: 809 | End: 2019-09-19
Attending: INTERNAL MEDICINE
Payer: MEDICARE

## 2019-09-19 VITALS
OXYGEN SATURATION: 94 % | RESPIRATION RATE: 20 BRPM | SYSTOLIC BLOOD PRESSURE: 147 MMHG | DIASTOLIC BLOOD PRESSURE: 67 MMHG | HEART RATE: 74 BPM

## 2019-09-19 VITALS
DIASTOLIC BLOOD PRESSURE: 80 MMHG | RESPIRATION RATE: 18 BRPM | OXYGEN SATURATION: 97 % | SYSTOLIC BLOOD PRESSURE: 149 MMHG | HEART RATE: 74 BPM | TEMPERATURE: 98.9 F

## 2019-09-19 DIAGNOSIS — C92.00 ACUTE MYELOID LEUKEMIA NOT HAVING ACHIEVED REMISSION (HCC): Primary | ICD-10-CM

## 2019-09-19 DIAGNOSIS — C92.00 AML (ACUTE MYELOID LEUKEMIA) WITH FAILED REMISSION (HCC): Primary | ICD-10-CM

## 2019-09-19 DIAGNOSIS — C92.00 AML (ACUTE MYELOID LEUKEMIA) WITH FAILED REMISSION (HCC): ICD-10-CM

## 2019-09-19 LAB
ALBUMIN SERPL-MCNC: 3.1 G/DL (ref 3.2–4.6)
ALBUMIN/GLOB SERPL: 1 {RATIO} (ref 1.2–3.5)
ALP SERPL-CCNC: 91 U/L (ref 50–136)
ALT SERPL-CCNC: 33 U/L (ref 12–65)
ANION GAP SERPL CALC-SCNC: 11 MMOL/L (ref 7–16)
AST SERPL-CCNC: 27 U/L (ref 15–37)
BILIRUB SERPL-MCNC: 0.3 MG/DL (ref 0.2–1.1)
BUN SERPL-MCNC: 16 MG/DL (ref 8–23)
CALCIUM SERPL-MCNC: 8.7 MG/DL (ref 8.3–10.4)
CHLORIDE SERPL-SCNC: 107 MMOL/L (ref 98–107)
CO2 SERPL-SCNC: 25 MMOL/L (ref 21–32)
CREAT SERPL-MCNC: 0.96 MG/DL (ref 0.6–1)
DIFFERENTIAL METHOD BLD: ABNORMAL
DIFFERENTIAL METHOD BLD: ABNORMAL
ERYTHROCYTE [DISTWIDTH] IN BLOOD BY AUTOMATED COUNT: 14.8 % (ref 11.9–14.6)
ERYTHROCYTE [DISTWIDTH] IN BLOOD BY AUTOMATED COUNT: 14.9 % (ref 11.9–14.6)
GLOBULIN SER CALC-MCNC: 3.2 G/DL (ref 2.3–3.5)
GLUCOSE SERPL-MCNC: 92 MG/DL (ref 65–100)
HCT VFR BLD AUTO: 19.3 % (ref 35.8–46.3)
HCT VFR BLD AUTO: 20.4 % (ref 35.8–46.3)
HGB BLD-MCNC: 6.5 G/DL (ref 11.7–15.4)
HGB BLD-MCNC: 6.8 G/DL (ref 11.7–15.4)
MAGNESIUM SERPL-MCNC: 2 MG/DL (ref 1.8–2.4)
MCH RBC QN AUTO: 29.1 PG (ref 26.1–32.9)
MCH RBC QN AUTO: 30 PG (ref 26.1–32.9)
MCHC RBC AUTO-ENTMCNC: 33.3 G/DL (ref 31.4–35)
MCHC RBC AUTO-ENTMCNC: 33.7 G/DL (ref 31.4–35)
MCV RBC AUTO: 87.2 FL (ref 79.6–97.8)
MCV RBC AUTO: 88.9 FL (ref 79.6–97.8)
NRBC # BLD: 0 K/UL (ref 0–0.2)
NRBC # BLD: 0 K/UL (ref 0–0.2)
PLATELET # BLD AUTO: 27 K/UL (ref 150–450)
PLATELET # BLD AUTO: 70 K/UL (ref 150–450)
PLATELET COMMENTS,PCOM: ABNORMAL
PLATELET COMMENTS,PCOM: ABNORMAL
PMV BLD AUTO: 10.8 FL (ref 9.4–12.3)
PMV BLD AUTO: 12.6 FL (ref 9.4–12.3)
POTASSIUM SERPL-SCNC: 4 MMOL/L (ref 3.5–5.1)
PROT SERPL-MCNC: 6.3 G/DL (ref 6.3–8.2)
RBC # BLD AUTO: 2.17 M/UL (ref 4.05–5.2)
RBC # BLD AUTO: 2.34 M/UL (ref 4.05–5.25)
RBC MORPH BLD: ABNORMAL
SODIUM SERPL-SCNC: 143 MMOL/L (ref 136–145)
WBC # BLD AUTO: 0.5 K/UL (ref 4.3–11.1)
WBC # BLD AUTO: 0.6 K/UL (ref 4.3–11.1)
WBC MORPH BLD: ABNORMAL
WBC MORPH BLD: ABNORMAL

## 2019-09-19 PROCEDURE — 77030018667 ADMN ST IV BLD FENW -A

## 2019-09-19 PROCEDURE — 36430 TRANSFUSION BLD/BLD COMPNT: CPT

## 2019-09-19 PROCEDURE — 88311 DECALCIFY TISSUE: CPT

## 2019-09-19 PROCEDURE — 74011250636 HC RX REV CODE- 250/636: Performed by: INTERNAL MEDICINE

## 2019-09-19 PROCEDURE — 83735 ASSAY OF MAGNESIUM: CPT

## 2019-09-19 PROCEDURE — 86900 BLOOD TYPING SEROLOGIC ABO: CPT

## 2019-09-19 PROCEDURE — 74011250636 HC RX REV CODE- 250/636: Performed by: RADIOLOGY

## 2019-09-19 PROCEDURE — 77012 CT SCAN FOR NEEDLE BIOPSY: CPT

## 2019-09-19 PROCEDURE — 74011250636 HC RX REV CODE- 250/636

## 2019-09-19 PROCEDURE — 88185 FLOWCYTOMETRY/TC ADD-ON: CPT

## 2019-09-19 PROCEDURE — 85025 COMPLETE CBC W/AUTO DIFF WBC: CPT

## 2019-09-19 PROCEDURE — 88313 SPECIAL STAINS GROUP 2: CPT

## 2019-09-19 PROCEDURE — 74011250637 HC RX REV CODE- 250/637: Performed by: INTERNAL MEDICINE

## 2019-09-19 PROCEDURE — 88305 TISSUE EXAM BY PATHOLOGIST: CPT

## 2019-09-19 PROCEDURE — 74011000250 HC RX REV CODE- 250: Performed by: RADIOLOGY

## 2019-09-19 PROCEDURE — 88184 FLOWCYTOMETRY/ TC 1 MARKER: CPT

## 2019-09-19 PROCEDURE — 80053 COMPREHEN METABOLIC PANEL: CPT

## 2019-09-19 PROCEDURE — 36415 COLL VENOUS BLD VENIPUNCTURE: CPT

## 2019-09-19 PROCEDURE — 86923 COMPATIBILITY TEST ELECTRIC: CPT

## 2019-09-19 PROCEDURE — 86644 CMV ANTIBODY: CPT

## 2019-09-19 PROCEDURE — 99152 MOD SED SAME PHYS/QHP 5/>YRS: CPT

## 2019-09-19 PROCEDURE — 88342 IMHCHEM/IMCYTCHM 1ST ANTB: CPT

## 2019-09-19 PROCEDURE — 88341 IMHCHEM/IMCYTCHM EA ADD ANTB: CPT

## 2019-09-19 PROCEDURE — 88312 SPECIAL STAINS GROUP 1: CPT

## 2019-09-19 RX ORDER — SODIUM CHLORIDE 9 MG/ML
250 INJECTION, SOLUTION INTRAVENOUS AS NEEDED
Status: DISCONTINUED | OUTPATIENT
Start: 2019-09-19 | End: 2019-09-23 | Stop reason: HOSPADM

## 2019-09-19 RX ORDER — ACETAMINOPHEN 325 MG/1
650 TABLET ORAL ONCE
Status: DISCONTINUED | OUTPATIENT
Start: 2019-09-19 | End: 2019-09-20 | Stop reason: HOSPADM

## 2019-09-19 RX ORDER — FENTANYL CITRATE 50 UG/ML
25-50 INJECTION, SOLUTION INTRAMUSCULAR; INTRAVENOUS
Status: DISCONTINUED | OUTPATIENT
Start: 2019-09-19 | End: 2019-09-23 | Stop reason: HOSPADM

## 2019-09-19 RX ORDER — LIDOCAINE HYDROCHLORIDE 20 MG/ML
1-10 INJECTION, SOLUTION EPIDURAL; INFILTRATION; INTRACAUDAL; PERINEURAL ONCE
Status: COMPLETED | OUTPATIENT
Start: 2019-09-19 | End: 2019-09-19

## 2019-09-19 RX ORDER — DIPHENHYDRAMINE HCL 25 MG
25 CAPSULE ORAL
Status: DISCONTINUED | OUTPATIENT
Start: 2019-09-19 | End: 2019-09-23 | Stop reason: HOSPADM

## 2019-09-19 RX ORDER — MIDAZOLAM HYDROCHLORIDE 1 MG/ML
.5-2 INJECTION, SOLUTION INTRAMUSCULAR; INTRAVENOUS
Status: DISCONTINUED | OUTPATIENT
Start: 2019-09-19 | End: 2019-09-23 | Stop reason: HOSPADM

## 2019-09-19 RX ORDER — SODIUM CHLORIDE 0.9 % (FLUSH) 0.9 %
10-30 SYRINGE (ML) INJECTION AS NEEDED
Status: DISCONTINUED | OUTPATIENT
Start: 2019-09-19 | End: 2019-09-23 | Stop reason: HOSPADM

## 2019-09-19 RX ORDER — SODIUM CHLORIDE 9 MG/ML
25 INJECTION, SOLUTION INTRAVENOUS CONTINUOUS
Status: DISCONTINUED | OUTPATIENT
Start: 2019-09-19 | End: 2019-09-23 | Stop reason: HOSPADM

## 2019-09-19 RX ORDER — HEPARIN 100 UNIT/ML
500 SYRINGE INTRAVENOUS ONCE
Status: COMPLETED | OUTPATIENT
Start: 2019-09-19 | End: 2019-09-19

## 2019-09-19 RX ORDER — ACETAMINOPHEN 325 MG/1
650 TABLET ORAL ONCE
Status: COMPLETED | OUTPATIENT
Start: 2019-09-19 | End: 2019-09-19

## 2019-09-19 RX ORDER — DIPHENHYDRAMINE HCL 25 MG
25 CAPSULE ORAL ONCE
Status: COMPLETED | OUTPATIENT
Start: 2019-09-19 | End: 2019-09-19

## 2019-09-19 RX ADMIN — MIDAZOLAM HYDROCHLORIDE 1 MG: 1 INJECTION, SOLUTION INTRAMUSCULAR; INTRAVENOUS at 15:38

## 2019-09-19 RX ADMIN — SODIUM CHLORIDE 25 ML/HR: 900 INJECTION, SOLUTION INTRAVENOUS at 15:31

## 2019-09-19 RX ADMIN — ACETAMINOPHEN 650 MG: 325 TABLET, FILM COATED ORAL at 09:59

## 2019-09-19 RX ADMIN — Medication 10 ML: at 10:00

## 2019-09-19 RX ADMIN — LIDOCAINE HYDROCHLORIDE 7 ML: 20 INJECTION, SOLUTION EPIDURAL; INFILTRATION; INTRACAUDAL; PERINEURAL at 15:40

## 2019-09-19 RX ADMIN — FENTANYL CITRATE 25 MCG: 50 INJECTION, SOLUTION INTRAMUSCULAR; INTRAVENOUS at 15:31

## 2019-09-19 RX ADMIN — FENTANYL CITRATE 50 MCG: 50 INJECTION, SOLUTION INTRAMUSCULAR; INTRAVENOUS at 15:38

## 2019-09-19 RX ADMIN — MIDAZOLAM HYDROCHLORIDE 0.5 MG: 1 INJECTION, SOLUTION INTRAMUSCULAR; INTRAVENOUS at 15:31

## 2019-09-19 RX ADMIN — SODIUM CHLORIDE, PRESERVATIVE FREE 500 UNITS: 5 INJECTION INTRAVENOUS at 16:23

## 2019-09-19 RX ADMIN — SODIUM CHLORIDE 250 ML: 900 INJECTION, SOLUTION INTRAVENOUS at 11:00

## 2019-09-19 RX ADMIN — DIPHENHYDRAMINE HYDROCHLORIDE 25 MG: 25 CAPSULE ORAL at 09:59

## 2019-09-19 NOTE — PROGRESS NOTES
9/19/19:  Saw patient in infusion. Patient reports feeling weak this week, legs sore off and on. She noted low grade temps off and on throughout the week as well. NP, Chelle, saw patient in infusion as well. Patient to call for elevated temps in the future. Temp today was 98.9. Platelets due prior to bone marrow biopsy. Patient also scheduled for blood. Patient to return on Monday to see Dr. Pranay Patrick and discuss preliminary bone marrow results and start next cycle of Dacogen.

## 2019-09-19 NOTE — DISCHARGE INSTRUCTIONS
[address] [facility]  Department of Interventional Radiology  [facility]  [phone] Office  [phone] Fax    BIOPSY DISCHARGE INSTRUCTIONS    General Instructions:     A biopsy is the removal of a small piece of tissue for microscopic examination or testing. Healthy tissue can be obtained for the purpose of tissue-type matching for transplants. Unhealthy tissues are more commonly biopsied to diagnose disease. Lung Biopsy:     A needle lung biopsy is performed when there is a mass discovered in the lung area. The most serious risk is infection, bleeding, and pneumothorax (a collapsed lung). Signs of pneumothorax include shortness of breath, rapid heart rate, and blueness of the skin. If any of these occur, call 911. Liver Biopsy: This test helps detect cancer, infections, and the cause of an enlargement of the liver or elevated liver enzymes. It also helps to diagnose a number of liver diseases. The pain associated with the procedure may be felt in the shoulder. The risks include internal bleeding, pneumothorax, and injury to the surrounding organs. Renal Biopsy: This procedure is sometimes done to evaluate a transplanted kidney. It is also used to evaluate unexplained decrease in kidney function, blood, or protein in the urine. You may see bright red blood in the urine the first 24 hours after the test. If the bleeding lasts longer, you need to call your doctor. There is a risk of infection and bleeding into the muscle, which may cause soreness. Spinal Biopsy: This test is sometimes done in conjunction with other procedures. Your back will be sore, as we are taking a small sample of bone, which is slightly more difficult to sample than tissue. General Biopsy:     A mass can grow in any area of the body, and we are taking a specimen as ordered by your doctor. The risks are the same.  They include bleeding, pain, and infection. Home Care Instructions: You may resume your regular diet and medication regimen. Do not drink alcohol, drive, or make any important legal decisions in the next 24 hours. Do not lift anything heavier than a gallon of milk until the soreness goes away. You may use over the counter acetaminophen or ibuprofen for the soreness. You may apply an ice pack to the affected area for 20-30 minutes at time for the first 24 hours. After that, you may apply a heat pack. Call If: You should call your Physician and/or the Radiology Nurse if you have any questions or concerns about the biopsy site. Call if you should have increased pain, fever, redness, drainage, or bleeding more than a small spot on the bandage. Interventional Radiology General Nurse Discharge    After general anesthesia or intravenous sedation, for 24 hours or while taking prescription Narcotics:  · Limit your activities  · Do not drive and operate hazardous machinery  · Do not make important personal or business decisions  · Do  not drink alcoholic beverages  · If you have not urinated within 8 hours after discharge, please contact your surgeon on call. * Please give a list of your current medications to your Primary Care Provider. * Please update this list whenever your medications are discontinued, doses are     changed, or new medications (including over-the-counter products) are added. * Please carry medication information at all times in case of emergency situations. These are general instructions for a healthy lifestyle:    No smoking/ No tobacco products/ Avoid exposure to second hand smoke  Surgeon General's Warning:  Quitting smoking now greatly reduces serious risk to your health.     Obesity, smoking, and sedentary lifestyle greatly increases your risk for illness  A healthy diet, regular physical exercise & weight monitoring are important for maintaining a healthy lifestyle    You may be retaining fluid if you have a history of heart failure or if you experience any of the following symptoms:  Weight gain of 3 pounds or more overnight or 5 pounds in a week, increased swelling in our hands or feet or shortness of breath while lying flat in bed. Please call your doctor as soon as you notice any of these symptoms; do not wait until your next office visit. Recognize signs and symptoms of STROKE:  F-face looks uneven    A-arms unable to move or move unevenly    S-speech slurred or non-existent    T-time-call 911 as soon as signs and symptoms begin-DO NOT go       Back to bed or wait to see if you get better-TIME IS BRAIN. Follow-Up Instructions: Please see your ordering doctor as he/she has requested. To Reach Us: If you have any questions about your procedure, please call the Interventional Radiology department at [phone]. After business hours (5pm) and weekends, call the answering service at [phone] and ask for the Radiologist on call to be paged. Si tiene Preguntas acerca del procedimiento, por favor llame al departamento de Radiología Intervencional al [phone]. Después de horas de oficina (5 pm) y los fines de [place], llamar al Brayden Garcia al [phone] y pregunte por el Radiologo de [facility].            Patient Signature:  Date: 9/19/2019  Discharging Nurse: Campbell Wu RN

## 2019-09-19 NOTE — PROGRESS NOTES
Recovery period without difficulty. Pt alert and oriented and denies pain. Dressing is clean, dry, and intact. Reviewed discharge instructions with patient and family, both verbalized understanding. Pt escorted to lobby discharge area via wheelchair. Vital signs and Oscar score completed.

## 2019-09-19 NOTE — PROCEDURES
Department of Interventional Radiology  [phone]        Interventional Radiology Brief Procedure Note    Patient: Vern Calvin MRN: [id number]  SSN: [id number]    YOB: 1945  Age: 68 y.o.   Sex: female      Date of Procedure: 9/19/2019    Pre-Procedure Diagnosis: Leukemia    Post-Procedure Diagnosis: SAME    Procedure(s): Image Guided Biopsy    Brief Description of Procedure: CT guided biopsy    Performed By: Cristo Arora MD     Assistants: None    Anesthesia:Moderate Sedation    Estimated Blood Loss: Less than 10ml    Specimens:  Pathology    Implants:  None    Findings: Left iliac    Complications: None    Recommendations: routine post care      Follow Up: as needed    Signed By: Cristo Arora MD     September 19, 2019

## 2019-09-19 NOTE — PROGRESS NOTES
TRANSFER - OUT REPORT:    Verbal report given to Hosea Argueta RN on [employer]  being transferred to IR for routine post - op       Report consisted of patients Situation, Background, Assessment and   Recommendations(SBAR). Information from the following report(s) SBAR, Kardex, Procedure Summary and MAR was reviewed with the receiving nurse. Lines:   Venous Access Device Bioflo Double chest port  07/15/19 Upper chest (subclavicular area), left (Active)   Central Line Being Utilized No 9/19/2019 12:05 PM   Criteria for Appropriate Use Long term IV/antibiotic administration 9/19/2019  8:20 AM   Site Assessment Clean, dry, & intact 9/19/2019  8:20 AM   Date of Last Dressing Change 09/19/19 9/19/2019  8:20 AM   Dressing Status New 9/19/2019  8:20 AM   Dressing Type Disk with Chlorhexadine gluconate (CHG); Transparent 9/19/2019  8:20 AM   Action Taken Blood drawn 9/19/2019  8:20 AM   Date Accessed (Lateral Site) 09/19/19 9/19/2019  8:20 AM   Access Time (Lateral Site) 0820 9/19/2019  8:20 AM   Access Needle Size (Lateral Site) 20 G 9/19/2019  8:20 AM   Access Needle Length (Lateral Site) 0.75 inches 9/19/2019  8:20 AM   Positive Blood Return (Lateral Site) Yes 9/19/2019 12:05 PM   Action Taken (Lateral Site) Flushed 9/19/2019 12:05 PM   Alcohol Cap Used No 9/19/2019 12:05 PM        Opportunity for questions and clarification was provided.       Patient transported with:   Registered Nurse

## 2019-09-19 NOTE — H&P
History and Physical    Patient: Arturo Benjamin MRN: [id number]  SSN: [id number]    YOB: 1945  Age: 68 y.o. Sex: female      Subjective:      Arturo Benjamin is a 68 y.o. female who has AML. Presents for bone marrow biopsy. NPO. Past Medical History:   Diagnosis Date    AML (acute myeloid leukemia) ([facility] [address] dx- 4/2019    followed by dr Brandon Lau    Depression     Hypercholesterolemia     Infection     of port -- was placed 5/2019-- removed 6/2019---right chest    Psychiatric disorder     aniexty    Sleep apnea      Past Surgical History:   Procedure Laterality Date    HX OTHER SURGICAL      colonoscopy    HX VASCULAR ACCESS      IR INSERT TUNL CVC W PORT OVER 5 YEARS  4/30/2019    IR INSERT TUNL CVC W PORT OVER 5 YEARS  7/15/2019    IR REMOVE TUNL CVAD W PORT/PUMP  6/13/2019      Family History   Problem Relation Age of Onset    Cancer Father      Social History     Tobacco Use    Smoking status: Never Smoker    Smokeless tobacco: Never Used   Substance Use Topics    Alcohol use: Never     Frequency: Never      Prior to Admission medications    Medication Sig Start Date End Date Taking? Authorizing Provider   fluconazole (DIFLUCAN) 200 mg tablet Take 1 Tab by mouth daily for 30 days. FDA advises cautious prescribing of oral fluconazole in pregnancy. 8/29/19 9/28/19 Yes Christine Jay NP   acyclovir (ZOVIRAX) 400 mg tablet Take 1 Tab by mouth two (2) times a day for 30 days. 8/29/19 9/28/19 Yes Laci Gonzalez NP   gilteritinib (XOSPATA) 40 mg tab Take 120 mg by mouth daily (with lunch). Indications: acute myeloid leukemia with FLT3 mutation 8/22/19  Yes Earney Hammans, MD   DULoxetine (CYMBALTA) 30 mg capsule Take 1 Cap by mouth daily. 8/5/19  Yes Earney Hammans, MD   zolpidem (AMBIEN) 5 mg tablet Take 1 Tab by mouth nightly as needed for Sleep.  Max Daily Amount: 5 mg. 8/1/19  Yes Lu Tran NP   lidocaine-prilocaine (EMLA) topical cream Apply  to affected area as needed for Pain. 7/18/19  Yes Rosie Montoya NP   cholecalciferol (VITAMIN D3) 400 unit tab tablet Take 2 Tabs by mouth daily. 6/7/19  Yes Leslie Hurtado NP   ondansetron hcl (ZOFRAN) 8 mg tablet Take 1 Tab by mouth every eight (8) hours as needed for Nausea. 4/30/19  Yes Alicja Yun NP   vit C/E/zinc/lutein/zeaxanthin ([address] PO) Take 1 Tab by mouth daily. Yes Provider, Historical   LORazepam (ATIVAN) 1 mg tablet Take  by mouth nightly. Yes Provider, Historical   acetaminophen 500 mg tab 500 mg, diphenhydrAMINE 25 mg cap 25 mg Take  by mouth nightly. Yes Provider, Historical   pravastatin (PRAVACHOL) 10 mg tablet Take  by mouth nightly. Yes Provider, Historical        No Known Allergies    Review of Systems:  Pertinent items are noted in the History of Present Illness. Objective: There were no vitals filed for this visit. Physical Exam:  LUNG: clear to auscultation bilaterally   rrr    Assessment:     Hospital Problems  Date Reviewed: 9/19/2019    None          Plan:     CT guided bone marrow biopsy. Moderate sedation.     Signed By: Hiro Ortez MD     September 19, 2019

## 2019-09-21 ENCOUNTER — APPOINTMENT (OUTPATIENT)
Dept: GENERAL RADIOLOGY | Age: 74
DRG: 809 | End: 2019-09-21
Attending: EMERGENCY MEDICINE
Payer: MEDICARE

## 2019-09-21 ENCOUNTER — HOSPITAL ENCOUNTER (OUTPATIENT)
Dept: INFUSION THERAPY | Age: 74
Discharge: HOME | End: 2019-09-21
Payer: MEDICARE

## 2019-09-21 ENCOUNTER — HOSPITAL ENCOUNTER (INPATIENT)
Age: 74
LOS: 19 days | Discharge: HOME | DRG: 809 | End: 2019-10-10
Attending: EMERGENCY MEDICINE | Admitting: INTERNAL MEDICINE
Payer: MEDICARE

## 2019-09-21 VITALS
HEART RATE: 73 BPM | OXYGEN SATURATION: 97 % | TEMPERATURE: 98.7 F | DIASTOLIC BLOOD PRESSURE: 46 MMHG | RESPIRATION RATE: 18 BRPM | SYSTOLIC BLOOD PRESSURE: 127 MMHG

## 2019-09-21 DIAGNOSIS — D70.9 FEBRILE NEUTROPENIA (HCC): Primary | ICD-10-CM

## 2019-09-21 DIAGNOSIS — B99.9 IMMUNOCOMPROMISED STATUS ASSOCIATED WITH INFECTION (HCC): ICD-10-CM

## 2019-09-21 DIAGNOSIS — R53.1 WEAKNESS GENERALIZED: ICD-10-CM

## 2019-09-21 DIAGNOSIS — Z51.11 ADMISSION FOR ANTINEOPLASTIC CHEMOTHERAPY: ICD-10-CM

## 2019-09-21 DIAGNOSIS — D84.81 IMMUNOCOMPROMISED STATUS ASSOCIATED WITH INFECTION (HCC): ICD-10-CM

## 2019-09-21 DIAGNOSIS — R06.09 DOE (DYSPNEA ON EXERTION): ICD-10-CM

## 2019-09-21 DIAGNOSIS — D61.818 PANCYTOPENIA (HCC): ICD-10-CM

## 2019-09-21 DIAGNOSIS — R19.7 DIARRHEA, UNSPECIFIED TYPE: ICD-10-CM

## 2019-09-21 DIAGNOSIS — C92.00 ACUTE MYELOID LEUKEMIA NOT HAVING ACHIEVED REMISSION (HCC): ICD-10-CM

## 2019-09-21 DIAGNOSIS — L03.221 CELLULITIS OF NECK: ICD-10-CM

## 2019-09-21 DIAGNOSIS — D69.6 THROMBOCYTOPENIA (HCC): ICD-10-CM

## 2019-09-21 DIAGNOSIS — R50.81 FEBRILE NEUTROPENIA (HCC): Primary | ICD-10-CM

## 2019-09-21 DIAGNOSIS — D64.9 SEVERE ANEMIA: ICD-10-CM

## 2019-09-21 LAB
ALBUMIN SERPL-MCNC: 3 G/DL (ref 3.2–4.6)
ALBUMIN/GLOB SERPL: 0.9 {RATIO} (ref 1.2–3.5)
ALP SERPL-CCNC: 94 U/L (ref 50–136)
ALT SERPL-CCNC: 34 U/L (ref 12–65)
AMORPH CRY URNS QL MICRO: ABNORMAL
ANION GAP SERPL CALC-SCNC: 6 MMOL/L (ref 7–16)
APPEARANCE UR: CLEAR
AST SERPL-CCNC: 32 U/L (ref 15–37)
BACTERIA URNS QL MICRO: 0 /HPF
BILIRUB SERPL-MCNC: 0.3 MG/DL (ref 0.2–1.1)
BILIRUB UR QL: NEGATIVE
BUN SERPL-MCNC: 17 MG/DL (ref 8–23)
CALCIUM SERPL-MCNC: 8.5 MG/DL (ref 8.3–10.4)
CHLORIDE SERPL-SCNC: 107 MMOL/L (ref 98–107)
CO2 SERPL-SCNC: 28 MMOL/L (ref 21–32)
COLOR UR: YELLOW
CREAT SERPL-MCNC: 1.05 MG/DL (ref 0.6–1)
DIFFERENTIAL METHOD BLD: ABNORMAL
ERYTHROCYTE [DISTWIDTH] IN BLOOD BY AUTOMATED COUNT: 14.2 % (ref 11.9–14.6)
GLOBULIN SER CALC-MCNC: 3.5 G/DL (ref 2.3–3.5)
GLUCOSE SERPL-MCNC: 136 MG/DL (ref 65–100)
GLUCOSE UR STRIP.AUTO-MCNC: NEGATIVE MG/DL
HCT VFR BLD AUTO: 21 % (ref 35.8–46.3)
HGB BLD-MCNC: 7.5 G/DL (ref 11.7–15.4)
HGB UR QL STRIP: ABNORMAL
KETONES UR QL STRIP.AUTO: NEGATIVE MG/DL
LACTATE BLD-SCNC: 0.67 MMOL/L (ref 0.5–1.9)
LEUKOCYTE ESTERASE UR QL STRIP.AUTO: NEGATIVE
MCH RBC QN AUTO: 30 PG (ref 26.1–32.9)
MCHC RBC AUTO-ENTMCNC: 35.7 G/DL (ref 31.4–35)
MCV RBC AUTO: 84 FL (ref 79.6–97.8)
NITRITE UR QL STRIP.AUTO: NEGATIVE
NRBC # BLD: 0 K/UL (ref 0–0.2)
OTHER OBSERVATIONS,UCOM: ABNORMAL
PH UR STRIP: 6.5 [PH] (ref 5–9)
PLATELET # BLD AUTO: 39 K/UL (ref 150–450)
PLATELET COMMENTS,PCOM: ABNORMAL
PMV BLD AUTO: 10.7 FL (ref 9.4–12.3)
POTASSIUM SERPL-SCNC: 3.8 MMOL/L (ref 3.5–5.1)
PROCALCITONIN SERPL-MCNC: 0.1 NG/ML
PROT SERPL-MCNC: 6.5 G/DL (ref 6.3–8.2)
PROT UR STRIP-MCNC: 30 MG/DL
RBC # BLD AUTO: 2.5 M/UL (ref 4.05–5.2)
RBC #/AREA URNS HPF: ABNORMAL /HPF
RBC MORPH BLD: ABNORMAL
SODIUM SERPL-SCNC: 141 MMOL/L (ref 136–145)
SP GR UR REFRACTOMETRY: 1.02 (ref 1–1.02)
UROBILINOGEN UR QL STRIP.AUTO: 1 EU/DL (ref 0.2–1)
WBC # BLD AUTO: 0.5 K/UL (ref 4.3–11.1)
WBC MORPH BLD: ABNORMAL

## 2019-09-21 PROCEDURE — P9040 RBC LEUKOREDUCED IRRADIATED: HCPCS

## 2019-09-21 PROCEDURE — 77030020263 HC SOL INJ SOD CL0.9% LFCR 1000ML

## 2019-09-21 PROCEDURE — 93005 ELECTROCARDIOGRAM TRACING: CPT | Performed by: INTERNAL MEDICINE

## 2019-09-21 PROCEDURE — 87086 URINE CULTURE/COLONY COUNT: CPT

## 2019-09-21 PROCEDURE — 65270000015 HC RM PRIVATE ONCOLOGY

## 2019-09-21 PROCEDURE — 87040 BLOOD CULTURE FOR BACTERIA: CPT

## 2019-09-21 PROCEDURE — 74011250636 HC RX REV CODE- 250/636: Performed by: STUDENT IN AN ORGANIZED HEALTH CARE EDUCATION/TRAINING PROGRAM

## 2019-09-21 PROCEDURE — 65270000029 HC RM PRIVATE

## 2019-09-21 PROCEDURE — 36430 TRANSFUSION BLD/BLD COMPNT: CPT

## 2019-09-21 PROCEDURE — 99284 EMERGENCY DEPT VISIT MOD MDM: CPT | Performed by: EMERGENCY MEDICINE

## 2019-09-21 PROCEDURE — 74011250636 HC RX REV CODE- 250/636: Performed by: INTERNAL MEDICINE

## 2019-09-21 PROCEDURE — 77030018667 ADMN ST IV BLD FENW -A

## 2019-09-21 PROCEDURE — 85025 COMPLETE CBC W/AUTO DIFF WBC: CPT

## 2019-09-21 PROCEDURE — 96374 THER/PROPH/DIAG INJ IV PUSH: CPT | Performed by: EMERGENCY MEDICINE

## 2019-09-21 PROCEDURE — 74011250636 HC RX REV CODE- 250/636: Performed by: EMERGENCY MEDICINE

## 2019-09-21 PROCEDURE — 80053 COMPREHEN METABOLIC PANEL: CPT

## 2019-09-21 PROCEDURE — 71046 X-RAY EXAM CHEST 2 VIEWS: CPT

## 2019-09-21 PROCEDURE — 74011000258 HC RX REV CODE- 258: Performed by: EMERGENCY MEDICINE

## 2019-09-21 PROCEDURE — 74011250637 HC RX REV CODE- 250/637: Performed by: EMERGENCY MEDICINE

## 2019-09-21 PROCEDURE — 74011250637 HC RX REV CODE- 250/637: Performed by: INTERNAL MEDICINE

## 2019-09-21 PROCEDURE — 83605 ASSAY OF LACTIC ACID: CPT

## 2019-09-21 PROCEDURE — 81001 URINALYSIS AUTO W/SCOPE: CPT

## 2019-09-21 PROCEDURE — 84145 PROCALCITONIN (PCT): CPT

## 2019-09-21 RX ORDER — DULOXETIN HYDROCHLORIDE 30 MG/1
30 CAPSULE, DELAYED RELEASE ORAL DAILY
Status: DISCONTINUED | OUTPATIENT
Start: 2019-09-22 | End: 2019-10-10 | Stop reason: HOSPADM

## 2019-09-21 RX ORDER — SODIUM CHLORIDE 0.9 % (FLUSH) 0.9 %
10-40 SYRINGE (ML) INJECTION AS NEEDED
Status: DISCONTINUED | OUTPATIENT
Start: 2019-09-21 | End: 2019-09-25 | Stop reason: HOSPADM

## 2019-09-21 RX ORDER — VANCOMYCIN 2 GRAM/500 ML IN 0.9 % SODIUM CHLORIDE INTRAVENOUS
2000 ONCE
Status: COMPLETED | OUTPATIENT
Start: 2019-09-21 | End: 2019-09-22

## 2019-09-21 RX ORDER — LORAZEPAM 1 MG/1
1 TABLET ORAL
Status: DISCONTINUED | OUTPATIENT
Start: 2019-09-21 | End: 2019-10-10 | Stop reason: HOSPADM

## 2019-09-21 RX ORDER — ACETAMINOPHEN 325 MG/1
650 TABLET ORAL ONCE
Status: COMPLETED | OUTPATIENT
Start: 2019-09-21 | End: 2019-09-21

## 2019-09-21 RX ORDER — ACETAMINOPHEN 500 MG
1000 TABLET ORAL
Status: COMPLETED | OUTPATIENT
Start: 2019-09-21 | End: 2019-09-21

## 2019-09-21 RX ORDER — PRAVASTATIN SODIUM 20 MG/1
10 TABLET ORAL
Status: DISCONTINUED | OUTPATIENT
Start: 2019-09-21 | End: 2019-10-10 | Stop reason: HOSPADM

## 2019-09-21 RX ORDER — SODIUM CHLORIDE 9 MG/ML
50 INJECTION, SOLUTION INTRAVENOUS CONTINUOUS
Status: DISCONTINUED | OUTPATIENT
Start: 2019-09-21 | End: 2019-09-27

## 2019-09-21 RX ORDER — SODIUM CHLORIDE 0.9 % (FLUSH) 0.9 %
5-10 SYRINGE (ML) INJECTION AS NEEDED
Status: DISCONTINUED | OUTPATIENT
Start: 2019-09-21 | End: 2019-10-10 | Stop reason: HOSPADM

## 2019-09-21 RX ORDER — SODIUM CHLORIDE 9 MG/ML
250 INJECTION, SOLUTION INTRAVENOUS AS NEEDED
Status: DISCONTINUED | OUTPATIENT
Start: 2019-09-21 | End: 2019-09-25 | Stop reason: HOSPADM

## 2019-09-21 RX ORDER — ACYCLOVIR 800 MG/1
400 TABLET ORAL 2 TIMES DAILY
Status: DISCONTINUED | OUTPATIENT
Start: 2019-09-22 | End: 2019-10-10 | Stop reason: HOSPADM

## 2019-09-21 RX ORDER — DIPHENHYDRAMINE HCL 25 MG
50 CAPSULE ORAL
Status: DISCONTINUED | OUTPATIENT
Start: 2019-09-21 | End: 2019-09-29 | Stop reason: SDUPTHER

## 2019-09-21 RX ORDER — DIPHENHYDRAMINE HCL 25 MG
25 CAPSULE ORAL ONCE
Status: COMPLETED | OUTPATIENT
Start: 2019-09-21 | End: 2019-09-21

## 2019-09-21 RX ORDER — ZOLPIDEM TARTRATE 5 MG/1
5 TABLET ORAL
Status: DISCONTINUED | OUTPATIENT
Start: 2019-09-21 | End: 2019-10-10 | Stop reason: HOSPADM

## 2019-09-21 RX ADMIN — VANCOMYCIN HYDROCHLORIDE 2000 MG: 10 INJECTION, POWDER, LYOPHILIZED, FOR SOLUTION INTRAVENOUS at 22:56

## 2019-09-21 RX ADMIN — SODIUM CHLORIDE 100 ML/HR: 900 INJECTION, SOLUTION INTRAVENOUS at 22:37

## 2019-09-21 RX ADMIN — DIPHENHYDRAMINE HYDROCHLORIDE 50 MG: 25 CAPSULE ORAL at 23:49

## 2019-09-21 RX ADMIN — SODIUM CHLORIDE 250 ML: 900 INJECTION, SOLUTION INTRAVENOUS at 09:23

## 2019-09-21 RX ADMIN — Medication 10 ML: at 11:11

## 2019-09-21 RX ADMIN — Medication 10 ML: at 09:23

## 2019-09-21 RX ADMIN — DIPHENHYDRAMINE HYDROCHLORIDE 25 MG: 25 CAPSULE ORAL at 09:23

## 2019-09-21 RX ADMIN — PRAVASTATIN SODIUM 10 MG: 20 TABLET ORAL at 22:57

## 2019-09-21 RX ADMIN — LORAZEPAM 1 MG: 1 TABLET ORAL at 22:57

## 2019-09-21 RX ADMIN — SODIUM CHLORIDE 500 ML: 900 INJECTION, SOLUTION INTRAVENOUS at 19:04

## 2019-09-21 RX ADMIN — ACETAMINOPHEN 650 MG: 325 TABLET, FILM COATED ORAL at 09:23

## 2019-09-21 RX ADMIN — PIPERACILLIN SODIUM AND TAZOBACTAM SODIUM 4.5 G: 4; .5 INJECTION, POWDER, LYOPHILIZED, FOR SOLUTION INTRAVENOUS at 20:28

## 2019-09-21 RX ADMIN — Medication 10 ML: at 22:57

## 2019-09-21 RX ADMIN — ACETAMINOPHEN 1000 MG: 500 TABLET, FILM COATED ORAL at 23:49

## 2019-09-21 NOTE — ED TRIAGE NOTES
Pt states she was at home when she started running a fever. Currently receiving chemo for leukemia. Temp at home was 102.5.

## 2019-09-21 NOTE — PROGRESS NOTES
Arrived to the [facility]. 1 unit PRBCs completed. Patient tolerated well. Any issues or concerns during appointment: Patient presented to infusion center with oral temp of 100.1. Matilde Montoya NP made aware. No orders received. Patient aware of next infusion appointment on 9/23/19 at 2pm. 
Discharged ambulatory.

## 2019-09-22 LAB
ABO + RH BLD: NORMAL
ALBUMIN SERPL-MCNC: 2.9 G/DL (ref 3.2–4.6)
ALBUMIN/GLOB SERPL: 0.9 {RATIO} (ref 1.2–3.5)
ALP SERPL-CCNC: 88 U/L (ref 50–136)
ALT SERPL-CCNC: 29 U/L (ref 12–65)
ANION GAP SERPL CALC-SCNC: 6 MMOL/L (ref 7–16)
AST SERPL-CCNC: 29 U/L (ref 15–37)
ATRIAL RATE: 69 BPM
BASOPHILS # BLD: 0 K/UL (ref 0–0.2)
BASOPHILS NFR BLD: 0 % (ref 0–2)
BILIRUB SERPL-MCNC: 0.4 MG/DL (ref 0.2–1.1)
BLD PROD TYP BPU: NORMAL
BLOOD GROUP ANTIBODIES SERPL: NORMAL
BPU ID: NORMAL
BUN SERPL-MCNC: 16 MG/DL (ref 8–23)
CALCIUM SERPL-MCNC: 8.3 MG/DL (ref 8.3–10.4)
CALCULATED P AXIS, ECG09: 71 DEGREES
CALCULATED R AXIS, ECG10: -36 DEGREES
CALCULATED T AXIS, ECG11: 55 DEGREES
CHLORIDE SERPL-SCNC: 110 MMOL/L (ref 98–107)
CO2 SERPL-SCNC: 27 MMOL/L (ref 21–32)
CREAT SERPL-MCNC: 0.94 MG/DL (ref 0.6–1)
CROSSMATCH RESULT,%XM: NORMAL
DIAGNOSIS, 93000: NORMAL
DIFFERENTIAL METHOD BLD: ABNORMAL
EOSINOPHIL # BLD: 0 K/UL (ref 0–0.8)
EOSINOPHIL NFR BLD: 0 % (ref 0.5–7.8)
ERYTHROCYTE [DISTWIDTH] IN BLOOD BY AUTOMATED COUNT: 14.4 % (ref 11.9–14.6)
GLOBULIN SER CALC-MCNC: 3.3 G/DL (ref 2.3–3.5)
GLUCOSE SERPL-MCNC: 93 MG/DL (ref 65–100)
HCT VFR BLD AUTO: 20.7 % (ref 35.8–46.3)
HGB BLD-MCNC: 7.2 G/DL (ref 11.7–15.4)
IMM GRANULOCYTES # BLD AUTO: 0 K/UL (ref 0–0.5)
IMM GRANULOCYTES NFR BLD AUTO: 0 % (ref 0–5)
LYMPHOCYTES # BLD: 0.6 K/UL (ref 0.5–4.6)
LYMPHOCYTES NFR BLD: 89 % (ref 13–44)
MCH RBC QN AUTO: 30 PG (ref 26.1–32.9)
MCHC RBC AUTO-ENTMCNC: 34.8 G/DL (ref 31.4–35)
MCV RBC AUTO: 86.3 FL (ref 79.6–97.8)
MONOCYTES # BLD: 0 K/UL (ref 0.1–1.3)
MONOCYTES NFR BLD: 8 % (ref 4–12)
NEUTS SEG # BLD: 0 K/UL (ref 1.7–8.2)
NEUTS SEG NFR BLD: 3 % (ref 43–78)
NRBC # BLD: 0 K/UL (ref 0–0.2)
P-R INTERVAL, ECG05: 202 MS
PLATELET # BLD AUTO: 30 K/UL (ref 150–450)
PLATELET COMMENTS,PCOM: ABNORMAL
PMV BLD AUTO: 10.7 FL (ref 9.4–12.3)
POTASSIUM SERPL-SCNC: 3.6 MMOL/L (ref 3.5–5.1)
PROT SERPL-MCNC: 6.2 G/DL (ref 6.3–8.2)
Q-T INTERVAL, ECG07: 426 MS
QRS DURATION, ECG06: 100 MS
QTC CALCULATION (BEZET), ECG08: 456 MS
RBC # BLD AUTO: 2.4 M/UL (ref 4.05–5.2)
RBC MORPH BLD: ABNORMAL
SODIUM SERPL-SCNC: 143 MMOL/L (ref 136–145)
SPECIMEN EXP DATE BLD: NORMAL
STATUS OF UNIT,%ST: NORMAL
UNIT DIVISION, %UDIV: 0
VENTRICULAR RATE, ECG03: 69 BPM
WBC # BLD AUTO: 0.6 K/UL (ref 4.3–11.1)
WBC MORPH BLD: ABNORMAL

## 2019-09-22 PROCEDURE — 74011250636 HC RX REV CODE- 250/636: Performed by: INTERNAL MEDICINE

## 2019-09-22 PROCEDURE — 80053 COMPREHEN METABOLIC PANEL: CPT

## 2019-09-22 PROCEDURE — 85025 COMPLETE CBC W/AUTO DIFF WBC: CPT

## 2019-09-22 PROCEDURE — 74011250637 HC RX REV CODE- 250/637: Performed by: INTERNAL MEDICINE

## 2019-09-22 PROCEDURE — 65270000029 HC RM PRIVATE

## 2019-09-22 PROCEDURE — 65270000015 HC RM PRIVATE ONCOLOGY

## 2019-09-22 PROCEDURE — 36591 DRAW BLOOD OFF VENOUS DEVICE: CPT

## 2019-09-22 PROCEDURE — 99223 1ST HOSP IP/OBS HIGH 75: CPT | Performed by: INTERNAL MEDICINE

## 2019-09-22 PROCEDURE — 74011250637 HC RX REV CODE- 250/637: Performed by: NURSE PRACTITIONER

## 2019-09-22 PROCEDURE — 77030020263 HC SOL INJ SOD CL0.9% LFCR 1000ML

## 2019-09-22 PROCEDURE — 74011000258 HC RX REV CODE- 258: Performed by: INTERNAL MEDICINE

## 2019-09-22 RX ORDER — ACETAMINOPHEN 325 MG/1
650 TABLET ORAL
Status: DISCONTINUED | OUTPATIENT
Start: 2019-09-22 | End: 2019-10-04 | Stop reason: SDUPTHER

## 2019-09-22 RX ORDER — POLYETHYLENE GLYCOL 3350 17 G/17G
17 POWDER, FOR SOLUTION ORAL
Status: DISCONTINUED | OUTPATIENT
Start: 2019-09-22 | End: 2019-10-10 | Stop reason: HOSPADM

## 2019-09-22 RX ORDER — VANCOMYCIN/0.9 % SOD CHLORIDE 1.5G/250ML
1500 PLASTIC BAG, INJECTION (ML) INTRAVENOUS
Status: DISCONTINUED | OUTPATIENT
Start: 2019-09-22 | End: 2019-09-24

## 2019-09-22 RX ADMIN — POLYETHYLENE GLYCOL 3350 17 G: 17 POWDER, FOR SOLUTION ORAL at 09:21

## 2019-09-22 RX ADMIN — PIPERACILLIN SODIUM,TAZOBACTAM SODIUM 3.38 G: 3; .375 INJECTION, POWDER, FOR SOLUTION INTRAVENOUS at 03:10

## 2019-09-22 RX ADMIN — PIPERACILLIN SODIUM,TAZOBACTAM SODIUM 3.38 G: 3; .375 INJECTION, POWDER, FOR SOLUTION INTRAVENOUS at 11:44

## 2019-09-22 RX ADMIN — PRAVASTATIN SODIUM 10 MG: 20 TABLET ORAL at 21:48

## 2019-09-22 RX ADMIN — ACYCLOVIR 400 MG: 800 TABLET ORAL at 17:38

## 2019-09-22 RX ADMIN — VANCOMYCIN HYDROCHLORIDE 1500 MG: 10 INJECTION, POWDER, LYOPHILIZED, FOR SOLUTION INTRAVENOUS at 17:38

## 2019-09-22 RX ADMIN — SODIUM CHLORIDE 100 ML/HR: 900 INJECTION, SOLUTION INTRAVENOUS at 11:47

## 2019-09-22 RX ADMIN — ACYCLOVIR 400 MG: 800 TABLET ORAL at 08:52

## 2019-09-22 RX ADMIN — DIPHENHYDRAMINE HYDROCHLORIDE 50 MG: 25 CAPSULE ORAL at 21:52

## 2019-09-22 RX ADMIN — ACETAMINOPHEN 650 MG: 325 TABLET, FILM COATED ORAL at 15:58

## 2019-09-22 RX ADMIN — PIPERACILLIN SODIUM,TAZOBACTAM SODIUM 3.38 G: 3; .375 INJECTION, POWDER, FOR SOLUTION INTRAVENOUS at 21:48

## 2019-09-22 RX ADMIN — LORAZEPAM 1 MG: 1 TABLET ORAL at 21:49

## 2019-09-22 RX ADMIN — DULOXETINE 30 MG: 30 CAPSULE, DELAYED RELEASE ORAL at 08:51

## 2019-09-22 NOTE — PROGRESS NOTES
EOS Note:  
 
VSS, Max Temp 102.5 Pain: No c/o pain during the shift. Neuro: A&Ox4. No c/o numbness or tingling. Cardio: S1/S2. SR. CV VS WNL for specified parameters. Resp: RA. Clear bilaterally. Symmetric chest wall excursion. Resp VS WNL. GI/: Voiding. Urine is yellow and clear. Last BM: 9/20/19. Miralax ordered PRN. No c/o N/V. Diet: Tolerating diet. Adequate intake noted. See I&O for further details. Ambulation: Pt ambulates independently. Up walking halls. No falls during the shift. Additional Information: Oncology to take over primary care of pt today. Patient shared that she takes Gilteritinib 120mg daily and took it today with lunch. Verified with NP that pt is to continue taking medication through admission. Medication is now verified per Pharm and at pt's bedside.  is bedside. Continue with POC. Report given to Ollie Gilliam RN.

## 2019-09-22 NOTE — PROGRESS NOTES
Problem: Falls - Risk of 
Goal: *Absence of Falls Description Document Rashaun Ema Fall Risk and appropriate interventions in the flowsheet. Outcome: Progressing Towards Goal 
Note:  
Fall Risk Interventions: 
  
Medication Interventions: Teach patient to arise slowly History of Falls Interventions: Evaluate medications/consider consulting pharmacy, Investigate reason for fall, Room close to nurse's station

## 2019-09-22 NOTE — PROGRESS NOTES
Problem: Falls - Risk of 
Goal: *Absence of Falls Description Document Devere Bryant Fall Risk and appropriate interventions in the flowsheet. Outcome: Progressing Towards Goal 
Note:  
Fall Risk Interventions: 
  
 
  
 
Medication Interventions: Patient to call before getting OOB History of Falls Interventions: Evaluate medications/consider consulting pharmacy, Investigate reason for fall

## 2019-09-22 NOTE — PROGRESS NOTES
TRANSFER - IN REPORT: 
 
Verbal report received from [address] on [employer]  being received from ER for routine progression of care. Report consisted of patients Situation, Background, Assessment and  
Recommendations(SBAR). Information from the following report(s) SBAR, Kardex, ED Summary and MAR was reviewed with the receiving nurse. Opportunity for questions and clarification was provided. Assessment to be completed upon patients arrival to unit and care assumed.

## 2019-09-22 NOTE — PROGRESS NOTES
09/21/19 2300 Dual Skin Pressure Injury Assessment Dual Skin Pressure Injury Assessment WDL Second Care Provider (Based on Facility Policy) Sarah Meredith Skin Integumentary Skin Integumentary (WDL) X Skin Color Ecchymosis (comment); Pale 
(large bruise L breast;scattered on extremities,lower back) Skin Condition/Temp Dry Skin Integrity Intact; Other (comment) (scxattered redness port site) Pressure  Injury Documentation No Pressure Injury Noted-Pressure Ulcer Prevention Initiated

## 2019-09-22 NOTE — ED PROVIDER NOTES
66-year-old female patient currently receiving chemotherapy for leukemia, she has between rounds of treatment and will start again on Monday. She presents with fevers up to 102.5 today and chills. Indicates she has had low-grade fevers between 100 -101 for the preceding week She feels pretty good now but denies cough, chest pain, abdominal pain reports no UTI symptoms, no nausea, vomiting, diarrhea. Patient has had a Port-A-Cath in since around June this year. She reports having had a line infection with her previous port which was removed prior to that. Past Medical History:  
Diagnosis Date  AML (acute myeloid leukemia) ([facility] [address] dx- 4/2019  
 followed by dr Ubaldo Hein  Depression  Hypercholesterolemia  Infection   
 of port -- was placed 5/2019-- removed 6/2019---right chest  
 Psychiatric disorder   
 aniexty  Sleep apnea Past Surgical History:  
Procedure Laterality Date  HX OTHER SURGICAL    
 colonoscopy  HX VASCULAR ACCESS    
 IR INSERT TUNL CVC W PORT OVER 5 YEARS  4/30/2019  IR INSERT TUNL CVC W PORT OVER 5 YEARS  7/15/2019  IR REMOVE TUNL CVAD W PORT/PUMP  6/13/2019 Family History:  
Problem Relation Age of Onset  Cancer Father Social History Socioeconomic History  Marital status:  Spouse name: Not on file  Number of children: Not on file  Years of education: Not on file  Highest education level: Not on file Occupational History  Not on file Social Needs  Financial resource strain: Not on file  Food insecurity:  
  Worry: Not on file Inability: Not on file  Transportation needs:  
  Medical: Not on file Non-medical: Not on file Tobacco Use  Smoking status: Never Smoker  Smokeless tobacco: Never Used Substance and Sexual Activity  Alcohol use: Never Frequency: Never  Drug use: Never  Sexual activity: Not on file Lifestyle  Physical activity: Days per week: Not on file Minutes per session: Not on file  Stress: Not on file Relationships  Social connections:  
  Talks on phone: Not on file Gets together: Not on file Attends Hinduism service: Not on file Active member of club or organization: Not on file Attends meetings of clubs or organizations: Not on file Relationship status: Not on file  Intimate partner violence:  
  Fear of current or ex partner: Not on file Emotionally abused: Not on file Physically abused: Not on file Forced sexual activity: Not on file Other Topics Concern [address] Service Not Asked  Blood Transfusions Not Asked  Caffeine Concern Not Asked  Occupational Exposure Not Asked [place] Hazards Not Asked  Sleep Concern Not Asked  Stress Concern Not Asked  Weight Concern Not Asked  Special Diet Not Asked  Back Care Not Asked  Exercise Not Asked  Bike Helmet Not Asked  Seat Belt Not Asked  Self-Exams Not Asked Social History Narrative  Not on file ALLERGIES: Patient has no known allergies. Review of Systems Constitutional: Positive for chills and fever. HENT: Negative for rhinorrhea and sore throat. Eyes: Negative for discharge and redness. Respiratory: Negative for cough and shortness of breath. Cardiovascular: Negative for chest pain and palpitations. Gastrointestinal: Negative for abdominal pain, diarrhea, nausea and vomiting. Genitourinary: Negative for difficulty urinating, dysuria, flank pain and frequency. Musculoskeletal: Negative for arthralgias and back pain. Skin: Negative for rash. Neurological: Negative for dizziness and headaches. All other systems reviewed and are negative. Vitals:  
 09/21/19 1929 09/21/19 1958 09/21/19 2043 09/21/19 2059 BP: 149/69 149/66 171/73 135/63 Pulse: 78   78 Resp:      
Temp:    (!) 102.1 °F (38.9 °C) SpO2: 97%   95% Weight:      
Height: Physical Exam  
Constitutional: She is oriented to person, place, and time. She appears well-developed and well-nourished. No distress. Fever noted HENT:  
Head: Normocephalic and atraumatic. Eyes: Pupils are equal, round, and reactive to light. Conjunctivae are normal. Right eye exhibits no discharge. Left eye exhibits no discharge. No scleral icterus. Neck: Normal range of motion. Neck supple. Cardiovascular: Normal rate, regular rhythm and normal heart sounds. Exam reveals no gallop. No murmur heard. Pulmonary/Chest: Effort normal and breath sounds normal. No respiratory distress. She has no wheezes. She has no rales. Abdominal: Soft. Bowel sounds are normal. There is no tenderness. There is no guarding. Musculoskeletal: Normal range of motion. She exhibits no edema. Neurological: She is alert and oriented to person, place, and time. She exhibits normal muscle tone. cni 2-12 grossly Skin: Skin is warm and dry. She is not diaphoretic. Psychiatric: She has a normal mood and affect. Her behavior is normal.  
Nursing note and vitals reviewed. MDM Number of Diagnoses or Management Options Diagnosis management comments: Medical decision making note: 
Febrile neutropenia and a leukemia patient receiving chemotherapy. Discussed with oncology. We will admit for culture surveillance. Broad-spectrum antibiotics initiated in the ER. Lactic acid is normal, vital signs are normal except for temperature. This concludes the \"medical decision making note\" part of this emergency department visit note. Procedures

## 2019-09-22 NOTE — PROGRESS NOTES
Pharmacokinetic Consult to Pharmacist 
 
Glenn Burdickchristiano is a 68 y.o. female being treated for sepsis of unknown etiology with vancomycin. Height: 5' 6\" (167.6 cm)  Weight: 88.2 kg (194 lb 6.4 oz) Lab Results Component Value Date/Time BUN 17 09/21/2019 06:50 PM  
 Creatinine 1.05 (H) 09/21/2019 06:50 PM  
 WBC 0.5 (LL) 09/21/2019 06:50 PM  
 Procalcitonin 0.1 09/21/2019 06:50 PM  
 Lactic Acid (POC) 0.67 09/21/2019 08:51 PM  
  
Estimated Creatinine Clearance: 53.4 mL/min (A) (based on SCr of 1.05 mg/dL (H)). CULTURES: 
Results Procedure Component Value Units Date/Time CULTURE, URINE [[id number]] Collected:  09/21/19 2039 Order Status:  Completed Specimen:  Urine from Clean catch Updated:  09/21/19 2151 CULTURE, BLOOD [[id number]] Collected:  09/21/19 1903 Order Status:  Completed Specimen:  Blood Updated:  09/21/19 1937 CULTURE, BLOOD [[id number]] Collected:  09/21/19 1850 Order Status:  Completed Specimen:  Blood Updated:  09/21/19 1904 Day 1 of vancomycin. Goal trough is 15-20. Vancomycin dose initiated at 2000 mg x 1 dose; followed by Vanc 1500 mg IV q18h. Will continue to follow patient. Thank you, Mahesh Powers, PharmD.

## 2019-09-22 NOTE — H&P
Hospitalist H&P Note Admit Date:  2019  6:30 PM  
Name:  Sagar Slaughter Age:  68 y.o. 
:  1945 MRN:  [id number] PCP:  Darya Chris MD 
Treatment Team: Attending Provider: Sandra Cunningham MD; Primary Nurse: Scarlet Nickerson RN 
 
 
Cc; fever at home after blood transfusion HPI:  
74yr old female with pmhx sig for aml followed by dr. Mago Heart. ... She has been having low grade fevers for about 1 week. Highest 100.4. She went for blood transfusion today as scheduled. She was noted to have a high temp at home afterwards of 101 so she called the oncology nurse and was told to come to the er. In er she neutropenic and febrile. Er doc called oncologist as pt wanted to go home and they advised admission. Hospitalist requested to admit as oncology does not admit after 5pm or on the weekend. No source found on er workup. She denies a chest pain, cough or ut symptoms 10 systems reviewed and negative except as noted in HPI. Past Medical History:  
Diagnosis Date  AML (acute myeloid leukemia) ([facility] [address] dx- 2019  
 followed by dr Madie Meade  Depression  Hypercholesterolemia  Infection   
 of port -- was placed 2019-- removed 2019---right chest  
 Psychiatric disorder   
 aniexty  Sleep apnea Past Surgical History:  
Procedure Laterality Date  HX OTHER SURGICAL    
 colonoscopy  HX VASCULAR ACCESS    
 IR INSERT TUNL CVC W PORT OVER 5 YEARS  2019  IR INSERT TUNL CVC W PORT OVER 5 YEARS  7/15/2019  IR REMOVE TUNL CVAD W PORT/PUMP  2019 No Known Allergies Social History Tobacco Use  Smoking status: Never Smoker  Smokeless tobacco: Never Used Substance Use Topics  Alcohol use: Never Frequency: Never Family History Problem Relation Age of Onset  Cancer Father Immunization History Administered Date(s) Administered  Influenza High Dose Vaccine PF 10/08/2018  Influenza Vaccine (Quad) Mdck Pf 10/27/2017  Influenza Vaccine PF 10/02/2014, 09/08/2015, 12/07/2016  Pneumococcal Conjugate (PCV-13) 12/08/2015  Pneumococcal Polysaccharide (PPSV-23) 12/23/2011  Tetanus Toxoid, Adsorbed 09/19/2014  Zoster Vaccine, Live 05/01/2015 PTA Medications: 
Prior to Admission Medications Prescriptions Last Dose Informant Patient Reported? Taking? DULoxetine (CYMBALTA) 30 mg capsule   No No  
Sig: Take 1 Cap by mouth daily. LORazepam (ATIVAN) 1 mg tablet   Yes No  
Sig: Take  by mouth nightly. acetaminophen 500 mg tab 500 mg, diphenhydrAMINE 25 mg cap 25 mg   Yes No  
Sig: Take  by mouth nightly. acyclovir (ZOVIRAX) 400 mg tablet   No No  
Sig: Take 1 Tab by mouth two (2) times a day for 30 days. cholecalciferol (VITAMIN D3) 400 unit tab tablet   No No  
Sig: Take 2 Tabs by mouth daily. fluconazole (DIFLUCAN) 200 mg tablet   No No  
Sig: Take 1 Tab by mouth daily for 30 days. FDA advises cautious prescribing of oral fluconazole in pregnancy. gilteritinib (XOSPATA) 40 mg tab   No No  
Sig: Take 120 mg by mouth daily (with lunch). 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  
lidocaine-prilocaine (EMLA) topical cream   No No  
Sig: Apply  to affected area as needed for Pain. ondansetron hcl (ZOFRAN) 8 mg tablet   No No  
Sig: Take 1 Tab by mouth every eight (8) hours as needed for Nausea. pravastatin (PRAVACHOL) 10 mg tablet   Yes No  
Sig: Take  by mouth nightly. vit C/E/zinc/lutein/zeaxanthin ([facility] PO)   Yes No  
Sig: Take 1 Tab by mouth daily. zolpidem (AMBIEN) 5 mg tablet   No No  
Sig: Take 1 Tab by mouth nightly as needed for Sleep. Max Daily Amount: 5 mg. Facility-Administered Medications: None Objective:  
 
Patient Vitals for the past 24 hrs: 
 Temp Pulse Resp BP SpO2  
09/21/19 2059 (!) 102.1 °F (38.9 °C) 78  135/63 95 % 09/21/19 2043    171/73   
09/21/19 1958    149/66  09/21/19 1929  78  149/69 97 % 09/21/19 1908  77  153/68 97 % 09/21/19 1759 (!) 102.6 °F (39.2 °C) (!) 101 18 (!) 143/110 95 % Oxygen Therapy O2 Sat (%): 95 % (09/21/19 2059) Pulse via Oximetry: 78 beats per minute (09/21/19 1929) O2 Device: Room air (09/21/19 1759) Intake/Output Summary (Last 24 hours) at 9/21/2019 2126 Last data filed at 9/21/2019 2115 Gross per 24 hour Intake 600 ml Output  Net 600 ml Physical Exam: 
General:    Well nourished. Alert. Eyes:   Normal sclera. Extraocular movements intact. ENT:  Normocephalic, atraumatic. Moist mucous membranes CV:   RRR. No m/r/g. Peripheral pulses present. Capillary refill normal 
Lungs:  CTAB. No wheezing, rhonchi, or rales. Abdomen: Soft, nontender, nondistended. Bowel sounds normal.  
Extremities: Warm and dry. No cyanosis or edema. Neurologic: CN II-XII grossly intact. Sensation intact. Skin:     No rashes or jaundice. Normal coloration Psych:  Normal mood and affect. I reviewed the labs, imaging, EKGs, telemetry, and other studies done this admission. Data Review:  
Recent Results (from the past 24 hour(s)) CBC WITH AUTOMATED DIFF Collection Time: 09/21/19  6:50 PM  
Result Value Ref Range WBC 0.5 (LL) 4.3 - 11.1 K/uL  
 RBC 2.50 (L) 4.05 - 5.2 M/uL HGB 7.5 (L) 11.7 - 15.4 g/dL HCT 21.0 (LL) 35.8 - 46.3 % MCV 84.0 79.6 - 97.8 FL  
 MCH 30.0 26.1 - 32.9 PG  
 MCHC 35.7 (H) 31.4 - 35.0 g/dL  
 RDW 14.2 11.9 - 14.6 % PLATELET 39 (L) 531 - 450 K/uL MPV 10.7 9.4 - 12.3 FL ABSOLUTE NRBC 0.00 0.0 - 0.2 K/uL  
 RBC COMMENTS SLIGHT 
HYPOCHROMIA 
    
 WBC COMMENTS WBC TOO FEW TO DIFFERENTIATE PLATELET COMMENTS MODERATE    
 DF MANUAL METABOLIC PANEL, COMPREHENSIVE Collection Time: 09/21/19  6:50 PM  
Result Value Ref Range Sodium 141 136 - 145 mmol/L Potassium 3.8 3.5 - 5.1 mmol/L Chloride 107 98 - 107 mmol/L  
 CO2 28 21 - 32 mmol/L  Anion gap 6 (L) 7 - 16 mmol/L  
 Glucose 136 (H) 65 - 100 mg/dL BUN 17 8 - 23 MG/DL Creatinine 1.05 (H) 0.6 - 1.0 MG/DL  
 GFR est AA >60 >60 ml/min/1.73m2 GFR est non-AA 55 (L) >60 ml/min/1.73m2 Calcium 8.5 8.3 - 10.4 MG/DL Bilirubin, total 0.3 0.2 - 1.1 MG/DL  
 ALT (SGPT) 34 12 - 65 U/L  
 AST (SGOT) 32 15 - 37 U/L Alk. phosphatase 94 50 - 136 U/L Protein, total 6.5 6.3 - 8.2 g/dL Albumin 3.0 (L) 3.2 - 4.6 g/dL Globulin 3.5 2.3 - 3.5 g/dL A-G Ratio 0.9 (L) 1.2 - 3.5 URINALYSIS W/ RFLX MICROSCOPIC Collection Time: 09/21/19  8:39 PM  
Result Value Ref Range Color YELLOW Appearance CLEAR Specific gravity 1.016 1.001 - 1.023    
 pH (UA) 6.5 5.0 - 9.0 Protein 30 (A) NEG mg/dL Glucose NEGATIVE  mg/dL Ketone NEGATIVE  NEG mg/dL Bilirubin NEGATIVE  NEG Blood MODERATE (A) NEG Urobilinogen 1.0 0.2 - 1.0 EU/dL Nitrites NEGATIVE  NEG Leukocyte Esterase NEGATIVE  NEG    
 RBC 10-20 0 /hpf Bacteria 0 0 /hpf Amorphous Crystals TRACE 0 Other observations RESULTS VERIFIED MANUALLY POC LACTIC ACID Collection Time: 09/21/19  8:51 PM  
Result Value Ref Range Lactic Acid (POC) 0.67 0.5 - 1.9 mmol/L All Micro Results Procedure Component Value Units Date/Time CULTURE, URINE [[id number]] Order Status:  Sent Specimen:  Urine from Clean catch CULTURE, BLOOD [[id number]] Collected:  09/21/19 1903 Order Status:  Completed Specimen:  Blood Updated:  09/21/19 1937 CULTURE, BLOOD [[id number]] Collected:  09/21/19 1850 Order Status:  Completed Specimen:  Blood Updated:  09/21/19 1904 Other Studies: Xr Chest Pa Lat Result Date: 9/21/2019 EXAM: Chest x-ray. INDICATION: Febrile neutropenia. COMPARISON: May 18, 2019. TECHNIQUE: Frontal and lateral x-rays of the chest were obtained. FINDINGS: The lungs are clear except for minimal linear atelectasis or scarring in the lateral left costophrenic angle.  The cardiac size, mediastinal contour and pulmonary vasculature are within normal limits. No pneumothorax or pleural effusion is seen. A left chest wall infusion port catheter remains in place. IMPRESSION: No acute process. Assessment and Plan:  
 
Hospital Problems as of 9/21/2019 Date Reviewed: 9/19/2019 Codes Class Noted - Resolved POA Febrile neutropenia (HCC) ICD-10-CM: D70.9, R50.81 ICD-9-CM: 288.00, 780.61  9/21/2019 - Present Unknown PLAN: 
· Febrile neutropenia admit to the oncology floor · Neutropenic precautions · vanc and zosyn · Er radames 1 bc from periphery and one from the port- will f/up; also urine culture sent · Consult oncology for further mgt in the am 
· Patient is on fluconazole for unclear reasons. .. Will defer re-starting to oncology · Re-start appropriate home meds · Further w/up and mgt based on her clinical course · Left sided Port site appears inflamed - but pt states that is how it normally looks and it is not tender on palpation; rt sided port was removed before for an infection DVT ppx:scds Anticipated DC needs:  none Code status:  Full Estimated LOS:  Greater than 2 midnights Risk:  high Signed: 
Hazel Rodriguez MD

## 2019-09-22 NOTE — PROGRESS NOTES
END OF SHIFT NOTE: 
 
Intake/Output 
09/21 1901 - 09/22 0700 In: 5349 [P.O.:240; I.V.:1364] Out: 700 [Urine:700] Voiding: YES Catheter: NO 
Drain:   
 
 
 
 
 
Stool:  0 occurrences. Emesis:  0 occurrences. VITAL SIGNS Patient Vitals for the past 12 hrs: 
 Temp Pulse Resp BP SpO2  
09/22/19 0314 99.2 °F (37.3 °C) 72 16 130/62 95 % 09/21/19 2231 100.2 °F (37.9 °C) 78 16 145/72 98 %  
09/21/19 2129    156/67   
09/21/19 2059 (!) 102.1 °F (38.9 °C) 78  135/63 95 % 09/21/19 2043    171/73   
09/21/19 1958    149/66   
09/21/19 1929  78  149/69 97 % 09/21/19 1908  77  153/68 97 % 09/21/19 1759 (!) 102.6 °F (39.2 °C) (!) 101 18 (!) 143/110 95 % Pain Assessment Pain 1 Pain Scale 1: Numeric (0 - 10) (09/22/19 0310) Pain Intensity 1: 0 (09/22/19 0310) Patient Stated Pain Goal: 0 (09/22/19 0310) Ambulating Yes Additional Information:  
Admitted from ED w/ neutropenic fever. Been afebrile since admitted to the floor. W/ area of redness/bruised port site;per pt not new. Shift report given to oncoming nurse Gregoria RN  at the bedside.  
 
Stew Deutsch RN

## 2019-09-22 NOTE — ED NOTES
TRANSFER - OUT REPORT: 
 
Verbal report given to Gilbert Dai on Leodis Meth  being transferred to [facility] [phone] for routine progression of care Report consisted of patients Situation, Background, Assessment and  
Recommendations(SBAR). Information from the following report(s) SBAR, ED Summary and Recent Results was reviewed with the receiving nurse. Lines:  
Venous Access Device Bioflo Double chest port  07/15/19 Upper chest (subclavicular area), left (Active) Central Line Being Utilized Yes 9/21/2019  9:36 AM  
Criteria for Appropriate Use Long term IV/antibiotic administration 9/21/2019  9:36 AM  
Site Assessment Clean, dry, & intact 9/21/2019  9:36 AM  
Date of Last Dressing Change 09/21/19 9/21/2019  9:36 AM  
Dressing Status Clean, dry, & intact 9/21/2019  9:36 AM  
Dressing Type Disk with Chlorhexadine gluconate (CHG); Transparent 9/21/2019  9:36 AM  
Date Accessed (Medial Site) 09/21/19 9/21/2019  9:36 AM  
Access Time (Medial Site) 0930 9/21/2019  9:36 AM  
Access Needle Size (Site #1) 20 G 9/21/2019  9:36 AM  
Access Needle Length (Medial Site) 0.75 inches 9/21/2019  9:36 AM  
Positive Blood Return (Medial Site) Yes 9/21/2019 11:10 AM  
Action Taken (Medial Site) Flushed; De-accessed 9/21/2019 11:10 AM  
  
 
Opportunity for questions and clarification was provided. Patient transported with: 
 Shnergle

## 2019-09-22 NOTE — CONSULTS
[facility] Hematology & Oncology Inpatient Hematology / Oncology Consult Note Reason for Consult:  Febrile neutropenia ([facility] [address] [D70.9, R50.81] Referring Physician:  Claudia Pardo MD 
 
History of Present Illness: Ms. Aminata Yadav is a 68year old female who was admitted on 9/21/2019 for neutropenic fever. She has been having intermittent low grade fevers over the last week. She came to infusion yesterday for a blood transfusion, and after going home she had a fever of 102.5 and was sent to the ER for evaluation. She is a known patient of Dr. Pippa Roman with AML. Initially treated with induction 7+3 and Midostaurin. Most recently on Decitabine plus Gilteritinib with cycle 2 beginning on 8/26/2019 and cycle 3 due 9/23/2019. CXR negative. UA clear. BC/UC NTD. Started on zosyn and vanc. We were consulted given she is our known patient undergoing treatment. Review of Systems: 
Constitutional +fevers, fatigue. Denies chills, weight loss, appetite changes, night sweats. HEENT Denies trauma, blurry vision, hearing loss, ear pain, nosebleeds, sore throat, neck pain and ear discharge. Skin Denies lesions or rashes. Lungs Denies dyspnea, cough, sputum production or hemoptysis. Cardiovascular Denies chest pain, palpitations, or lower extremity edema. Gastrointestinal Denies nausea, vomiting, changes in bowel habits, bloody or black stools, abdominal pain.  Denies dysuria, frequency or hesitancy of urination. Neuro Denies headaches, visual changes or ataxia. Denies dizziness, tingling, tremors, sensory change, speech change, focal weakness or headaches. Hematology Denies easy bruising or bleeding, denies gingival bleeding or epistaxis. Endo Denies heat/cold intolerance, denies diabetes or thyroid abnormalities. MSK Denies back pain, arthralgias, myalgias or frequent falls. Psychiatric/Behavioral The patient is not nervous/anxious. No Known Allergies Past Medical History:  
Diagnosis Date  AML (acute myeloid leukemia) ([facility] [address] dx- 4/2019  
 followed by dr Claudene Mighty  Depression  Hypercholesterolemia  Infection   
 of port -- was placed 5/2019-- removed 6/2019---right chest  
 Psychiatric disorder   
 aniexty  Sleep apnea Past Surgical History:  
Procedure Laterality Date  HX OTHER SURGICAL    
 colonoscopy  HX VASCULAR ACCESS    
 IR INSERT TUNL CVC W PORT OVER 5 YEARS  4/30/2019  IR INSERT TUNL CVC W PORT OVER 5 YEARS  7/15/2019  IR REMOVE TUNL CVAD W PORT/PUMP  6/13/2019 Family History Problem Relation Age of Onset  Cancer Father Social History Socioeconomic History  Marital status:  Spouse name: Not on file  Number of children: Not on file  Years of education: Not on file  Highest education level: Not on file Occupational History  Not on file Social Needs  Financial resource strain: Not on file  Food insecurity:  
  Worry: Not on file Inability: Not on file  Transportation needs:  
  Medical: Not on file Non-medical: Not on file Tobacco Use  Smoking status: Never Smoker  Smokeless tobacco: Never Used Substance and Sexual Activity  Alcohol use: Never Frequency: Never  Drug use: Never  Sexual activity: Not on file Lifestyle  Physical activity:  
  Days per week: Not on file Minutes per session: Not on file  Stress: Not on file Relationships  Social connections:  
  Talks on phone: Not on file Gets together: Not on file Attends Orthodoxy service: Not on file Active member of club or organization: Not on file Attends meetings of clubs or organizations: Not on file Relationship status: Not on file  Intimate partner violence:  
  Fear of current or ex partner: Not on file Emotionally abused: Not on file Physically abused: Not on file Forced sexual activity: Not on file Other Topics Concern [address] Service Not Asked  Blood Transfusions Not Asked  Caffeine Concern Not Asked  Occupational Exposure Not Asked Carlos Sadler Hazards Not Asked  Sleep Concern Not Asked  Stress Concern Not Asked  Weight Concern Not Asked  Special Diet Not Asked  Back Care Not Asked  Exercise Not Asked  Bike Helmet Not Asked  Seat Belt Not Asked  Self-Exams Not Asked Social History Narrative  Not on file Current Facility-Administered Medications Medication Dose Route Frequency Provider Last Rate Last Dose  vancomycin (VANCOCIN) 1500 mg in  ml infusion  1,500 mg IntraVENous Q18H Makayla Youssef MD      
 polyethylene glycol (MIRALAX) packet 17 g  17 g Oral DAILY PRN Laura Dempsey NP      
 sodium chloride (NS) flush 5-10 mL  5-10 mL IntraVENous PRN Radha Valdes MD   10 mL at 09/21/19 2257  piperacillin-tazobactam (ZOSYN) 3.375 g in 0.9% sodium chloride (MBP/ADV) 100 mL  3.375 g IntraVENous Q8H Radha Valdes MD 25 mL/hr at 09/22/19 0310 3.375 g at 09/22/19 0310  
 0.9% sodium chloride infusion  100 mL/hr IntraVENous CONTINUOUS Radha Valdes  mL/hr at 09/21/19 2237 100 mL/hr at 09/21/19 2237  DULoxetine (CYMBALTA) capsule 30 mg  30 mg Oral DAILY Radha Valdes MD   30 mg at 09/22/19 4962  LORazepam (ATIVAN) tablet 1 mg  1 mg Oral QHS Radha Valdes MD   1 mg at 09/21/19 2257  pravastatin (PRAVACHOL) tablet 10 mg  10 mg Oral QHS Radha Valdes MD   10 mg at 09/21/19 2257  zolpidem (AMBIEN) tablet 5 mg  5 mg Oral QHS PRN Radha Valdes MD      
 acyclovir (ZOVIRAX) tablet 400 mg  400 mg Oral BID Radha Valdes MD   400 mg at 09/22/19 3640  influenza vaccine 2019-20 (6 mos+)(PF) (FLUARIX/FLULAVAL/FLUZONE QUAD) injection 0.5 mL  0.5 mL IntraMUSCular PRIOR TO DISCHARGE Makayla Youssef MD      
 diphenhydrAMINE (BENADRYL) capsule 50 mg  50 mg Oral QHS PRN Ursula Garcia, Ryan Rg MD   50 mg at 19 2349 Facility-Administered Medications Ordered in Other Encounters Medication Dose Route Frequency Provider Last Rate Last Dose  
 0.9% sodium chloride infusion 250 mL  250 mL IntraVENous PRN Vipin Ramirez MD   Stopped at 19 1110  
 sodium chloride (NS) flush 10-40 mL  10-40 mL IntraVENous PRN Vipin Ramirez MD   10 mL at 19 1111  
 central line flush (saline) syringe 10-30 mL  10-30 mL InterCATHeter PRN Pal Mcdaniel MD   10 mL at 19 1000  
 0.9% sodium chloride infusion 250 mL  250 mL IntraVENous PRN Vipin Ramirez MD      
 0.9% sodium chloride infusion 250 mL  250 mL IntraVENous PRN Vipin Ramirez MD   Stopped at 19 1205  
 0.9% sodium chloride infusion  25 mL/hr IntraVENous CONTINUOUS Rebeca Ramos MD 25 mL/hr at 19 1531 25 mL/hr at 19 1531  
 fentaNYL citrate (PF) injection 25-50 mcg  25-50 mcg IntraVENous Multiple Rebeca Ramos MD   50 mcg at 19 438 4987  midazolam (VERSED) injection 0.5-2 mg  0.5-2 mg IntraVENous Multiple Rebeca Ramos MD   1 mg at 19 1538  
 0.9% sodium chloride infusion 250 mL  250 mL IntraVENous PRN Vipin Ramirez MD      
 diphenhydrAMINE (BENADRYL) capsule 25 mg  25 mg Oral Q6H PRN Vipin Ramirez MD      
 
 
OBJECTIVE: 
Patient Vitals for the past 8 hrs: 
 BP Temp Pulse Resp SpO2  
19 0749 111/65 98.3 °F (36.8 °C) 73 18 95 % 19 0314 130/62 99.2 °F (37.3 °C) 72 16 95 % Temp (24hrs), Av °F (37.8 °C), Min:98.3 °F (36.8 °C), Max:102.6 °F (39.2 °C) 
 
 07 -  1900 In: 240 [P.O.:240] Out: - Physical Exam: 
Constitutional: Well developed, well nourished female in no acute distress, sitting comfortably in the hospital bed. HEENT: Normocephalic and atraumatic. Oropharynx is clear, mucous membranes are moist.  Pupils are equal, round, and reactive to light. Extraocular muscles are intact. Sclerae anicteric. Skin Warm and dry. No bruising and no rash noted. No erythema. No pallor. Respiratory Lungs are clear to auscultation bilaterally without wheezes, rales or rhonchi, normal air exchange without accessory muscle use. CVS Normal rate, regular rhythm and normal S1 and S2. No murmurs, gallops, or rubs. Abdomen Soft, nontender and nondistended, normoactive bowel sounds. No palpable mass. No hepatosplenomegaly. Neuro Grossly nonfocal with no obvious sensory or motor deficits. MSK Normal range of motion in general.  No edema and no tenderness. Psych Appropriate mood and affect. Labs:   
Recent Results (from the past 24 hour(s)) CBC WITH AUTOMATED DIFF Collection Time: 09/21/19  6:50 PM  
Result Value Ref Range WBC 0.5 (LL) 4.3 - 11.1 K/uL  
 RBC 2.50 (L) 4.05 - 5.2 M/uL HGB 7.5 (L) 11.7 - 15.4 g/dL HCT 21.0 (LL) 35.8 - 46.3 % MCV 84.0 79.6 - 97.8 FL  
 MCH 30.0 26.1 - 32.9 PG  
 MCHC 35.7 (H) 31.4 - 35.0 g/dL  
 RDW 14.2 11.9 - 14.6 % PLATELET 39 (L) 816 - 450 K/uL MPV 10.7 9.4 - 12.3 FL ABSOLUTE NRBC 0.00 0.0 - 0.2 K/uL  
 RBC COMMENTS SLIGHT 
HYPOCHROMIA 
    
 WBC COMMENTS WBC TOO FEW TO DIFFERENTIATE PLATELET COMMENTS MODERATE    
 DF MANUAL METABOLIC PANEL, COMPREHENSIVE Collection Time: 09/21/19  6:50 PM  
Result Value Ref Range Sodium 141 136 - 145 mmol/L Potassium 3.8 3.5 - 5.1 mmol/L Chloride 107 98 - 107 mmol/L  
 CO2 28 21 - 32 mmol/L Anion gap 6 (L) 7 - 16 mmol/L Glucose 136 (H) 65 - 100 mg/dL BUN 17 8 - 23 MG/DL Creatinine 1.05 (H) 0.6 - 1.0 MG/DL  
 GFR est AA >60 >60 ml/min/1.73m2 GFR est non-AA 55 (L) >60 ml/min/1.73m2 Calcium 8.5 8.3 - 10.4 MG/DL Bilirubin, total 0.3 0.2 - 1.1 MG/DL  
 ALT (SGPT) 34 12 - 65 U/L  
 AST (SGOT) 32 15 - 37 U/L Alk. phosphatase 94 50 - 136 U/L Protein, total 6.5 6.3 - 8.2 g/dL Albumin 3.0 (L) 3.2 - 4.6 g/dL Globulin 3.5 2.3 - 3.5 g/dL A-G Ratio 0.9 (L) 1.2 - 3.5 CULTURE, BLOOD Collection Time: 09/21/19  6:50 PM  
Result Value Ref Range Special Requests: LEFT 
PORT Culture result: NO GROWTH AFTER 11 HOURS    
PROCALCITONIN Collection Time: 09/21/19  6:50 PM  
Result Value Ref Range Procalcitonin 0.1 ng/mL CULTURE, BLOOD Collection Time: 09/21/19  7:03 PM  
Result Value Ref Range Special Requests: LEFT Antecubital 
    
 Culture result: NO GROWTH AFTER 11 HOURS    
URINALYSIS W/ RFLX MICROSCOPIC Collection Time: 09/21/19  8:39 PM  
Result Value Ref Range Color YELLOW Appearance CLEAR Specific gravity 1.016 1.001 - 1.023    
 pH (UA) 6.5 5.0 - 9.0 Protein 30 (A) NEG mg/dL Glucose NEGATIVE  mg/dL Ketone NEGATIVE  NEG mg/dL Bilirubin NEGATIVE  NEG Blood MODERATE (A) NEG Urobilinogen 1.0 0.2 - 1.0 EU/dL Nitrites NEGATIVE  NEG Leukocyte Esterase NEGATIVE  NEG    
 RBC 10-20 0 /hpf Bacteria 0 0 /hpf Amorphous Crystals TRACE 0 Other observations RESULTS VERIFIED MANUALLY CULTURE, URINE Collection Time: 09/21/19  8:39 PM  
Result Value Ref Range Special Requests: NO SPECIAL REQUESTS Culture result:     
  NO GROWTH AFTER SHORT PERIOD OF INCUBATION. FURTHER RESULTS TO FOLLOW AFTER OVERNIGHT INCUBATION. POC LACTIC ACID Collection Time: 09/21/19  8:51 PM  
Result Value Ref Range Lactic Acid (POC) 0.67 0.5 - 1.9 mmol/L METABOLIC PANEL, COMPREHENSIVE Collection Time: 09/22/19  3:08 AM  
Result Value Ref Range Sodium 143 136 - 145 mmol/L Potassium 3.6 3.5 - 5.1 mmol/L Chloride 110 (H) 98 - 107 mmol/L  
 CO2 27 21 - 32 mmol/L Anion gap 6 (L) 7 - 16 mmol/L Glucose 93 65 - 100 mg/dL BUN 16 8 - 23 MG/DL Creatinine 0.94 0.6 - 1.0 MG/DL  
 GFR est AA >60 >60 ml/min/1.73m2 GFR est non-AA >60 >60 ml/min/1.73m2 Calcium 8.3 8.3 - 10.4 MG/DL  Bilirubin, total 0.4 0.2 - 1.1 MG/DL  
 ALT (SGPT) 29 12 - 65 U/L  
 AST (SGOT) 29 15 - 37 U/L  
 Alk. phosphatase 88 50 - 136 U/L Protein, total 6.2 (L) 6.3 - 8.2 g/dL Albumin 2.9 (L) 3.2 - 4.6 g/dL Globulin 3.3 2.3 - 3.5 g/dL A-G Ratio 0.9 (L) 1.2 - 3.5    
CBC WITH AUTOMATED DIFF Collection Time: 09/22/19  3:08 AM  
Result Value Ref Range WBC 0.6 (LL) 4.3 - 11.1 K/uL  
 RBC 2.40 (L) 4.05 - 5.2 M/uL HGB 7.2 (L) 11.7 - 15.4 g/dL HCT 20.7 (LL) 35.8 - 46.3 % MCV 86.3 79.6 - 97.8 FL  
 MCH 30.0 26.1 - 32.9 PG  
 MCHC 34.8 31.4 - 35.0 g/dL  
 RDW 14.4 11.9 - 14.6 % PLATELET 30 (LL) 135 - 450 K/uL MPV 10.7 9.4 - 12.3 FL ABSOLUTE NRBC 0.00 0.0 - 0.2 K/uL NEUTROPHILS 3 (L) 43 - 78 % LYMPHOCYTES 89 (H) 13 - 44 % MONOCYTES 8 4.0 - 12.0 % EOSINOPHILS 0 (L) 0.5 - 7.8 % BASOPHILS 0 0.0 - 2.0 % IMMATURE GRANULOCYTES 0 0.0 - 5.0 %  
 ABS. NEUTROPHILS 0.0 (L) 1.7 - 8.2 K/UL  
 ABS. LYMPHOCYTES 0.6 0.5 - 4.6 K/UL  
 ABS. MONOCYTES 0.0 (L) 0.1 - 1.3 K/UL  
 ABS. EOSINOPHILS 0.0 0.0 - 0.8 K/UL  
 ABS. BASOPHILS 0.0 0.0 - 0.2 K/UL  
 ABS. IMM. GRANS. 0.0 0.0 - 0.5 K/UL  
 RBC COMMENTS ANISOCYTOSIS + POIKILOCYTOSIS    
 WBC COMMENTS ATYPICAL LYMPHOCYTES PRESENT    
 PLATELET COMMENTS MARKED    
 DF AUTOMATED Imaging: XR CHEST PA LAT [[id number]] Collected: 09/21/19 2042 Order Status: Completed Updated: 09/21/19 2047 Narrative:    
EXAM: Chest x-ray. INDICATION: Febrile neutropenia. COMPARISON: May 18, 2019. TECHNIQUE: Frontal and lateral x-rays of the chest were obtained. FINDINGS: The lungs are clear except for minimal linear atelectasis or scarring 
in the lateral left costophrenic angle. The cardiac size, mediastinal contour 
and pulmonary vasculature are within normal limits. No pneumothorax or pleural 
effusion is seen. A left chest wall infusion port catheter remains in place. Impression:    
IMPRESSION: No acute process. ASSESSMENT: 
Problem List  Date Reviewed: 9/19/2019 Codes Class Noted * (Principal) Febrile neutropenia (HCC) ICD-10-CM: D70.9, R50.81 ICD-9-CM: 288.00, 780.61  9/21/2019 Pancytopenia due to antineoplastic chemotherapy [facility]) ICD-10-CM: D61.810, T45.1X5A 
ICD-9-CM: 284.11, E933.1  6/12/2019 Cellulitis of neck ICD-10-CM: X75.632 ICD-9-CM: 682.1  6/12/2019 Immunocompromised status associated with infection ([facility] ICD-10-CM: D84.9, B99.9 ICD-9-CM: 279.3, 136.9  6/12/2019 Port or reservoir infection ICD-10-CM: V99.883L ICD-9-CM: 999.33  6/12/2019 Acute myeloid leukemia not having achieved remission ([facility] ICD-10-CM: C92.00 ICD-9-CM: 205.00  5/9/2019 Admission for antineoplastic chemotherapy ICD-10-CM: Z51.11 ICD-9-CM: V58.11  5/5/2019 AML (acute myeloblastic leukemia) ([facility] ICD-10-CM: C92.00 ICD-9-CM: 205.00  4/28/2019 Weakness generalized ICD-10-CM: R53.1 ICD-9-CM: 780.79  4/28/2019 Pancytopenia ([facility] ICD-10-CM: G10.864 ICD-9-CM: 284.19  4/28/2019 Thrombocytopenia ([facility] ICD-10-CM: D69.6 ICD-9-CM: 287.5  4/27/2019 RECOMMENDATIONS: 
AML 
-On Dacogen/Gilteritinib with most recent cycle 2 on 8/26/2019 with cycle 3 due 9/23/2019 Neutropenic Fever 
-UA clear, BC/UC NTD 
-On Zosyn/Vanc empirically Lab studies and imaging studies  were personally reviewed. Pertinent old records were reviewed. Thank you for allowing us to participate in the care of Ms. Evelyne Patel. We will take over the care of our patient. [facility] [address] [address] Office : [phone] Fax : [phone] I personally saw, exammed and counselled the patient, and discussed with NP, agree with above history/assessment/plan. 68 y. o.female AML with FLT3 mut s/o cycle 2 dacogen and Gilteritinib admitted with NF, T102.5, ANC 0, exam stable and no obvious source of infection, continue broad spec abc, pRBC, follow cultures, supportive care per SOP. Aj Mobley M.D. [address] Office : [phone] Fax : [phone]

## 2019-09-23 ENCOUNTER — APPOINTMENT (OUTPATIENT)
Dept: INFUSION THERAPY | Age: 74
End: 2019-09-23
Payer: MEDICARE

## 2019-09-23 LAB
ALBUMIN SERPL-MCNC: 2.5 G/DL (ref 3.2–4.6)
ALBUMIN/GLOB SERPL: 0.7 {RATIO} (ref 1.2–3.5)
ALP SERPL-CCNC: 81 U/L (ref 50–136)
ALT SERPL-CCNC: 32 U/L (ref 12–65)
ANION GAP SERPL CALC-SCNC: 5 MMOL/L (ref 7–16)
AST SERPL-CCNC: 29 U/L (ref 15–37)
BILIRUB SERPL-MCNC: 0.4 MG/DL (ref 0.2–1.1)
BUN SERPL-MCNC: 11 MG/DL (ref 8–23)
CALCIUM SERPL-MCNC: 7.6 MG/DL (ref 8.3–10.4)
CHLORIDE SERPL-SCNC: 112 MMOL/L (ref 98–107)
CO2 SERPL-SCNC: 26 MMOL/L (ref 21–32)
CREAT SERPL-MCNC: 0.83 MG/DL (ref 0.6–1)
DIFFERENTIAL METHOD BLD: ABNORMAL
ERYTHROCYTE [DISTWIDTH] IN BLOOD BY AUTOMATED COUNT: 14.6 % (ref 11.9–14.6)
FLOW CYTOMETRY, FBTC1: NORMAL
GLOBULIN SER CALC-MCNC: 3.4 G/DL (ref 2.3–3.5)
GLUCOSE SERPL-MCNC: 95 MG/DL (ref 65–100)
HCT VFR BLD AUTO: 18.8 % (ref 35.8–46.3)
HGB BLD-MCNC: 6.5 G/DL (ref 11.7–15.4)
MAGNESIUM SERPL-MCNC: 1.8 MG/DL (ref 1.8–2.4)
MCH RBC QN AUTO: 30.4 PG (ref 26.1–32.9)
MCHC RBC AUTO-ENTMCNC: 34.6 G/DL (ref 31.4–35)
MCV RBC AUTO: 87.9 FL (ref 79.6–97.8)
NRBC # BLD: 0 K/UL (ref 0–0.2)
PHOSPHATE SERPL-MCNC: 2 MG/DL (ref 2.3–3.7)
PLATELET # BLD AUTO: 17 K/UL (ref 150–450)
PLATELET COMMENTS,PCOM: ABNORMAL
PMV BLD AUTO: 10 FL (ref 9.4–12.3)
POTASSIUM SERPL-SCNC: 3.4 MMOL/L (ref 3.5–5.1)
PROT SERPL-MCNC: 5.9 G/DL (ref 6.3–8.2)
RBC # BLD AUTO: 2.14 M/UL (ref 4.05–5.2)
RBC MORPH BLD: ABNORMAL
SODIUM SERPL-SCNC: 143 MMOL/L (ref 136–145)
SPECIMEN SOURCE: NORMAL
TEST ORDERED:: NORMAL
WBC # BLD AUTO: 0.5 K/UL (ref 4.3–11.1)
WBC MORPH BLD: ABNORMAL

## 2019-09-23 PROCEDURE — 74011250636 HC RX REV CODE- 250/636: Performed by: NURSE PRACTITIONER

## 2019-09-23 PROCEDURE — 77030020263 HC SOL INJ SOD CL0.9% LFCR 1000ML

## 2019-09-23 PROCEDURE — 74011250637 HC RX REV CODE- 250/637: Performed by: NURSE PRACTITIONER

## 2019-09-23 PROCEDURE — 80053 COMPREHEN METABOLIC PANEL: CPT

## 2019-09-23 PROCEDURE — P9040 RBC LEUKOREDUCED IRRADIATED: HCPCS

## 2019-09-23 PROCEDURE — 74011250637 HC RX REV CODE- 250/637: Performed by: INTERNAL MEDICINE

## 2019-09-23 PROCEDURE — 85025 COMPLETE CBC W/AUTO DIFF WBC: CPT

## 2019-09-23 PROCEDURE — 86644 CMV ANTIBODY: CPT

## 2019-09-23 PROCEDURE — 36591 DRAW BLOOD OFF VENOUS DEVICE: CPT

## 2019-09-23 PROCEDURE — 36430 TRANSFUSION BLD/BLD COMPNT: CPT

## 2019-09-23 PROCEDURE — 74011250636 HC RX REV CODE- 250/636: Performed by: INTERNAL MEDICINE

## 2019-09-23 PROCEDURE — 99232 SBSQ HOSP IP/OBS MODERATE 35: CPT | Performed by: INTERNAL MEDICINE

## 2019-09-23 PROCEDURE — 84100 ASSAY OF PHOSPHORUS: CPT

## 2019-09-23 PROCEDURE — 30243N1 TRANSFUSION OF NONAUTOLOGOUS RED BLOOD CELLS INTO CENTRAL VEIN, PERCUTANEOUS APPROACH: ICD-10-PCS | Performed by: INTERNAL MEDICINE

## 2019-09-23 PROCEDURE — 77030039270 HC TU BLD FLTR CARD -A

## 2019-09-23 PROCEDURE — 86923 COMPATIBILITY TEST ELECTRIC: CPT

## 2019-09-23 PROCEDURE — 65270000029 HC RM PRIVATE

## 2019-09-23 PROCEDURE — 74011000258 HC RX REV CODE- 258: Performed by: INTERNAL MEDICINE

## 2019-09-23 PROCEDURE — 86900 BLOOD TYPING SEROLOGIC ABO: CPT

## 2019-09-23 PROCEDURE — 83735 ASSAY OF MAGNESIUM: CPT

## 2019-09-23 RX ORDER — SODIUM,POTASSIUM PHOSPHATES 280-250MG
1 POWDER IN PACKET (EA) ORAL 3 TIMES DAILY
Status: COMPLETED | OUTPATIENT
Start: 2019-09-23 | End: 2019-09-25

## 2019-09-23 RX ORDER — POTASSIUM CHLORIDE 20 MEQ/1
20 TABLET, EXTENDED RELEASE ORAL 2 TIMES DAILY
Status: DISCONTINUED | OUTPATIENT
Start: 2019-09-23 | End: 2019-10-10 | Stop reason: HOSPADM

## 2019-09-23 RX ADMIN — ACYCLOVIR 400 MG: 800 TABLET ORAL at 08:26

## 2019-09-23 RX ADMIN — SODIUM CHLORIDE 50 ML/HR: 900 INJECTION, SOLUTION INTRAVENOUS at 21:36

## 2019-09-23 RX ADMIN — ACETAMINOPHEN 650 MG: 325 TABLET, FILM COATED ORAL at 13:54

## 2019-09-23 RX ADMIN — DIPHENHYDRAMINE HYDROCHLORIDE 25 MG: 25 CAPSULE ORAL at 13:54

## 2019-09-23 RX ADMIN — ACYCLOVIR 400 MG: 800 TABLET ORAL at 17:33

## 2019-09-23 RX ADMIN — POTASSIUM & SODIUM PHOSPHATES POWDER PACK 280-160-250 MG 1 PACKET: 280-160-250 PACK at 21:35

## 2019-09-23 RX ADMIN — PIPERACILLIN SODIUM,TAZOBACTAM SODIUM 3.38 G: 3; .375 INJECTION, POWDER, FOR SOLUTION INTRAVENOUS at 20:03

## 2019-09-23 RX ADMIN — DIPHENHYDRAMINE HYDROCHLORIDE 50 MG: 25 CAPSULE ORAL at 21:34

## 2019-09-23 RX ADMIN — POTASSIUM & SODIUM PHOSPHATES POWDER PACK 280-160-250 MG 1 PACKET: 280-160-250 PACK at 17:33

## 2019-09-23 RX ADMIN — LORAZEPAM 1 MG: 1 TABLET ORAL at 21:35

## 2019-09-23 RX ADMIN — Medication 10 ML: at 21:35

## 2019-09-23 RX ADMIN — POTASSIUM CHLORIDE 20 MEQ: 20 TABLET, EXTENDED RELEASE ORAL at 17:33

## 2019-09-23 RX ADMIN — SODIUM CHLORIDE 100 ML/HR: 900 INJECTION, SOLUTION INTRAVENOUS at 03:34

## 2019-09-23 RX ADMIN — ACETAMINOPHEN 650 MG: 325 TABLET, FILM COATED ORAL at 03:34

## 2019-09-23 RX ADMIN — POTASSIUM CHLORIDE 20 MEQ: 20 TABLET, EXTENDED RELEASE ORAL at 08:26

## 2019-09-23 RX ADMIN — PRAVASTATIN SODIUM 10 MG: 20 TABLET ORAL at 21:34

## 2019-09-23 RX ADMIN — DULOXETINE 30 MG: 30 CAPSULE, DELAYED RELEASE ORAL at 08:26

## 2019-09-23 RX ADMIN — VANCOMYCIN HYDROCHLORIDE 1500 MG: 10 INJECTION, POWDER, LYOPHILIZED, FOR SOLUTION INTRAVENOUS at 10:36

## 2019-09-23 RX ADMIN — PIPERACILLIN SODIUM,TAZOBACTAM SODIUM 3.38 G: 3; .375 INJECTION, POWDER, FOR SOLUTION INTRAVENOUS at 03:34

## 2019-09-23 RX ADMIN — PIPERACILLIN SODIUM,TAZOBACTAM SODIUM 3.38 G: 3; .375 INJECTION, POWDER, FOR SOLUTION INTRAVENOUS at 12:30

## 2019-09-23 RX ADMIN — ACETAMINOPHEN 650 MG: 325 TABLET, FILM COATED ORAL at 20:17

## 2019-09-23 RX ADMIN — POTASSIUM & SODIUM PHOSPHATES POWDER PACK 280-160-250 MG 1 PACKET: 280-160-250 PACK at 08:26

## 2019-09-23 NOTE — PROGRESS NOTES
Problem: Falls - Risk of 
Goal: *Absence of Falls Description Document Denis Hardy Fall Risk and appropriate interventions in the flowsheet. Outcome: Progressing Towards Goal 
Note:  
Fall Risk Interventions: 
  
Medication Interventions: Teach patient to arise slowly, Evaluate medications/consider consulting pharmacy History of Falls Interventions: Consult care management for discharge planning, Room close to nurse's station

## 2019-09-23 NOTE — PROGRESS NOTES
Report given to Shena Martinez RN. Pt resting quietly having received sleep aid per her own request. No visible s/sx of distress. No needs voiced at this time.

## 2019-09-23 NOTE — PROGRESS NOTES
Chart screened by MANJU  for discharge planning. Patient admitted under inpatient status due to febrile nutropenia. No therapies consulted during this admission. Patient receiving IV antibiotics at this time. Currently anticipate patient will DC to home whenever medically ready. No needs identified at this time. Please consult  if any new issues arise. Care Management Interventions Mode of Transport at Discharge: Other (see comment) Transition of Care Consult (CM Consult): Discharge Planning Discharge Durable Medical Equipment: No 
Physical Therapy Consult: No 
Occupational Therapy Consult: No 
Speech Therapy Consult: No 
Plan discussed with Pt/Family/Caregiver: No(Chart screened.) Discharge Location Discharge Placement: Home

## 2019-09-23 NOTE — PROGRESS NOTES
[facility] Hematology & Oncology Inpatient Hematology / Oncology Progress Note Admission Date: 2019  6:30 PM 
Reason for Admission/Hospital Course: Febrile neutropenia ([address] [D70.9, R50.81] 24 Hour Events: 
Feeling better today 
tmax 100.8 BC/UC NTD 
 
ROS: 
Constitutional: negative for fever, chills, weakness, malaise, fatigue. CV:  negative for chest pain, palpitations, edema. Respiratory:  negative for dyspnea, cough, wheezing. GI: negative for nausea, abdominal pain, diarrhea. 10 point review of systems is otherwise negative with the exception of the elements mentioned above in the HPI. No Known Allergies OBJECTIVE: 
Patient Vitals for the past 8 hrs: 
 BP Temp Pulse Resp SpO2  
19 1156 134/56 98.4 °F (36.9 °C)  18 97 % 19 0805 121/58 98.5 °F (36.9 °C) 69 18 97 % Temp (24hrs), Av.6 °F (37.6 °C), Min:98.4 °F (36.9 °C), Max:102.5 °F (39.2 °C) 
 
 0701 -  1900 In: 815 [P.O.:360; I.V.:455] Out: 500 [Urine:500] Physical Exam: 
Constitutional: Well developed, well nourished female in no acute distress, sitting comfortably in the hospital bed. HEENT: Normocephalic and atraumatic. Oropharynx is clear, mucous membranes are moist.  Pupils are equal, round, and reactive to light. Extraocular muscles are intact. Sclerae anicteric. Skin Warm and dry. No bruising and no rash noted. No erythema. No pallor. Respiratory Lungs are clear to auscultation bilaterally without wheezes, rales or rhonchi, normal air exchange without accessory muscle use. CVS Normal rate, regular rhythm and normal S1 and S2. No murmurs, gallops, or rubs. Abdomen Soft, nontender and nondistended, normoactive bowel sounds. No palpable mass. No hepatosplenomegaly. Neuro Grossly nonfocal with no obvious sensory or motor deficits. MSK Normal range of motion in general.  No edema and no tenderness. Psych Appropriate mood and affect. Labs: Recent Labs  
  09/23/19 0328 09/22/19 0308 09/21/19 
1850 WBC 0.5* 0.6* 0.5* RBC 2.14* 2.40* 2.50* HGB 6.5* 7.2* 7.5* HCT 18.8* 20.7* 21.0*  
MCV 87.9 86.3 84.0 MCH 30.4 30.0 30.0 MCHC 34.6 34.8 35.7*  
RDW 14.6 14.4 14.2 PLT 17* 30* 39* GRANS  --  3*  --   
LYMPH  --  89*  --   
MONOS  --  8  --   
EOS  --  0*  --   
BASOS  --  0  --   
IG  --  0  --   
DF MANUAL AUTOMATED MANUAL ANEU  --  0.0*  -- ABL  --  0.6  --   
ABM  --  0.0*  --   
ALEKSANDR  --  0.0  --   
ABB  --  0.0  --   
AIG  --  0.0  --   
 
  
Recent Labs  
  09/23/19 0328 09/22/19 0308 09/21/19 
1850  143 141  
K 3.4* 3.6 3.8 * 110* 107 CO2 26 27 28 AGAP 5* 6* 6*  
GLU 95 93 136* BUN 11 16 17 CREA 0.83 0.94 1.05* GFRAA >60 >60 >60 GFRNA >60 >60 55* CA 7.6* 8.3 8.5 SGOT 29 29 32 AP 81 88 94  
TP 5.9* 6.2* 6.5 ALB 2.5* 2.9* 3.0*  
GLOB 3.4 3.3 3.5 AGRAT 0.7* 0.9* 0.9* MG 1.8  --   --   
PHOS 2.0*  --   --   
 
 
 
Imaging: 
 
Medications: 
Current Facility-Administered Medications Medication Dose Route Frequency  potassium chloride (K-DUR, KLOR-CON) SR tablet 20 mEq  20 mEq Oral BID  potassium, sodium phosphates (NEUTRA-PHOS) packet 1 Packet  1 Packet Oral TID  [START ON 9/24/2019] Vancomycin Trough Level Reminder   Other ONCE  
 vancomycin (VANCOCIN) 1500 mg in  ml infusion  1,500 mg IntraVENous Q18H  polyethylene glycol (MIRALAX) packet 17 g  17 g Oral DAILY PRN  
 acetaminophen (TYLENOL) tablet 650 mg  650 mg Oral Q6H PRN  
 gilteritinib tab (Xospata) 120 mg = 3 tabs  (Patient Supplied)  120 mg Oral PCL  sodium chloride (NS) flush 5-10 mL  5-10 mL IntraVENous PRN  piperacillin-tazobactam (ZOSYN) 3.375 g in 0.9% sodium chloride (MBP/ADV) 100 mL  3.375 g IntraVENous Q8H  
 0.9% sodium chloride infusion  50 mL/hr IntraVENous CONTINUOUS  
 DULoxetine (CYMBALTA) capsule 30 mg  30 mg Oral DAILY  LORazepam (ATIVAN) tablet 1 mg  1 mg Oral QHS  pravastatin (PRAVACHOL) tablet 10 mg  10 mg Oral QHS  zolpidem (AMBIEN) tablet 5 mg  5 mg Oral QHS PRN  
 acyclovir (ZOVIRAX) tablet 400 mg  400 mg Oral BID  influenza vaccine 2019-20 (6 mos+)(PF) (FLUARIX/FLULAVAL/FLUZONE QUAD) injection 0.5 mL  0.5 mL IntraMUSCular PRIOR TO DISCHARGE  diphenhydrAMINE (BENADRYL) capsule 50 mg  50 mg Oral QHS PRN Facility-Administered Medications Ordered in Other Encounters Medication Dose Route Frequency  0.9% sodium chloride infusion 250 mL  250 mL IntraVENous PRN  
 sodium chloride (NS) flush 10-40 mL  10-40 mL IntraVENous PRN  
 
 
 
ASSESSMENT: 
 
Problem List  Date Reviewed: 9/19/2019 Codes Class Noted * (Principal) Febrile neutropenia (HCC) ICD-10-CM: D70.9, R50.81 ICD-9-CM: 288.00, 780.61  9/21/2019 Pancytopenia due to antineoplastic chemotherapy [facility]) ICD-10-CM: D61.810, T45.1X5A 
ICD-9-CM: 284.11, E933.1  6/12/2019 Cellulitis of neck ICD-10-CM: O42.407 ICD-9-CM: 682.1  6/12/2019 Immunocompromised status associated with infection ([facility] ICD-10-CM: D84.9, B99.9 ICD-9-CM: 279.3, 136.9  6/12/2019 Port or reservoir infection ICD-10-CM: Y64.787O ICD-9-CM: 999.33  6/12/2019 Acute myeloid leukemia not having achieved remission ([facility] ICD-10-CM: C92.00 ICD-9-CM: 205.00  5/9/2019 Admission for antineoplastic chemotherapy ICD-10-CM: Z51.11 ICD-9-CM: V58.11  5/5/2019 AML (acute myeloblastic leukemia) ([facility] ICD-10-CM: C92.00 ICD-9-CM: 205.00  4/28/2019 Weakness generalized ICD-10-CM: R53.1 ICD-9-CM: 780.79  4/28/2019 Pancytopenia ([facility] ICD-10-CM: P76.012 ICD-9-CM: 284.19  4/28/2019 Thrombocytopenia ([facility] ICD-10-CM: D69.6 ICD-9-CM: 287.5  4/27/2019 Ms. Irena Hung is a 68year old female who was admitted on 9/21/2019 for neutropenic fever. She has been having intermittent low grade fevers over the last week.   She came to infusion yesterday for a blood transfusion, and after going home she had a fever of 102.5 and was sent to the ER for evaluation. She is a known patient of Dr. Katharina Mistry with AML. Initially treated with induction 7+3 and Midostaurin. Most recently on Decitabine plus Gilteritinib with cycle 2 beginning on 8/26/2019 and cycle 3 due 9/23/2019. CXR negative. UA clear. BC/UC NTD. Started on zosyn and vanc. PLAN: 
AML 
-On Dacogen/Gilteritinib with most recent cycle 2 on 8/26/2019 with cycle 3 due 9/23/2019 9/23 Discuss BMbx flow results with patient-persistent AML-58% blasts. Still waiting on final pathology.  
  
Neutropenic Fever 
-UA clear, BC/UC NTD 
-On Zosyn/Vanc empirically 9/23 Tmax 100.8. Feeling better today. Day 2 zosyn and vanc. BC/UC NTD Pancytopenia related to chemo 9/23 hgb 6.5 transfuse per [facility] SOPs Goals and plan of care reviewed with the patient. All questions answered to the best of our ability. [facility] [address] [place] [address] Office : [phone] Fax : [phone] Attending Addendum: 
Patient seen with NP. Ms Tony Rob is a 79yo woman admitted on 9/21 with neutropenic fever. Pt of Dr Sharmin Witt with known AML. S/p induction 7+3/Midostaurin and most recently on Decitabine and Gilteritinib. C 3 was due today, and last C2 startted 8/26/19. She presented to [facility] infusion for blood transfusion and upon arriving home developed fevers of 102.5 and presented to ED for further evaluation. She was started on broad spectrum antibiotics. Infx workup neg thus far. She feels much better today. Labs reviewed, and she will be transfused for Hb <7. BMbx with flow blasts at 58%. Awaiting final path. Will d/w Dr Katharina Mistry - primary oncologist. I personally performed a face to face diagnostic evaluation on this patient. My findings are as follows: A&ox3, lungs clear, heart regular, abdomen benign and no LE edema. C/w supportive care. I have reviewed and agree with the care plan. French Marvin MD 
[facility] Hematology and Oncology [address] [place] [address] Office : [phone] Fax : [phone]

## 2019-09-23 NOTE — PROGRESS NOTES
END OF SHIFT NOTE: 
 
Patient rested well overnight, TMax 100.8, treated with Tylenol as ordered, see MAR. C/O mild headache, resolved with Tylenol. Voiding well, IVF infusing without difficulty, large bruise noted at and below Left chest port site. Patient is scheduled to receive C3 of Dacogen today, 9/23, but likely to be held for count recovery. PO Glitertinib at bedside. Patient states that she has a previously scheduled appointment at the [facility] today, and wants to be sure that the MD/NP is aware that she is inpatient and will not be at the appointment. Assured patient that this will not be a problem. Intake/Output 
09/22 1901 - 09/23 0700 In: 5200 [P.O.:460; I.V.:1379] Out: 1350 [PWCZS:6158] Voiding: YES Catheter: NO 
Drain:   
 
Stool:  0 occurrences. Stool Assessment Stool Color: Read Dylan (09/22/19 1524) Stool Appearance: Formed (09/22/19 1524) Stool Amount: Small (09/22/19 1524) Stool Source/Status: Rectum (09/22/19 1524) Emesis:  0 occurrences. VITAL SIGNS Patient Vitals for the past 12 hrs: 
 Temp Pulse Resp BP SpO2  
09/23/19 0449 99.2 °F (37.3 °C)      
09/23/19 0321 (!) 100.8 °F (38.2 °C) 82 18 148/54 95 % 09/22/19 2334 99.3 °F (37.4 °C) 78 18 146/65 97 % 09/22/19 1953 99.2 °F (37.3 °C) 77 18 119/54 96 %  
09/22/19 1742 99 °F (37.2 °C)     Pain Assessment Pain 1 Pain Scale 1: Visual (09/23/19 0419) Pain Intensity 1: 0 (09/23/19 0419) Patient Stated Pain Goal: 0 (09/23/19 0419) Pain Reassessment 1: Patient resting w/respiratory rate greater than 10 (09/23/19 0419) Pain Onset 1: 20 minutes (09/23/19 0334) Pain Location 1: Head (09/23/19 0334) Pain Orientation 1: Anterior (09/23/19 0334) Pain Description 1: Dull (09/23/19 0334) Pain Intervention(s) 1: Medication (see MAR) (09/23/19 0334) Ambulating Yes Shift report given to oncoming nurse, Gregoria, at the bedside. Елена Ortega

## 2019-09-23 NOTE — PROGRESS NOTES
Problem: Falls - Risk of 
Goal: *Absence of Falls Description Document Karla Mario Fall Risk and appropriate interventions in the flowsheet. Outcome: Progressing Towards Goal 
Note:  
Fall Risk Interventions: 
  
 
  
 
Medication Interventions: Evaluate medications/consider consulting pharmacy, Teach patient to arise slowly History of Falls Interventions: Consult care management for discharge planning, Evaluate medications/consider consulting pharmacy, Investigate reason for fall

## 2019-09-23 NOTE — PROGRESS NOTES
Initial visit by  to convey care and concern and encourage patient that  services are available if desired. No needs were voiced during the visit. Provided 's business card for future reference. Chaplains remain available for support. Anuj Washington MDiv Board Certified [employer]

## 2019-09-23 NOTE — PROGRESS NOTES
EOS Note:  
  
VSS, Max Temp 101.9 Pain: No c/o pain during the shift. Neuro: A&Ox4. No c/o numbness or tingling. Cardio: S1/S2. SR. CV VS WNL for specified parameters. Resp: RA. Clear bilaterally. Symmetric chest wall excursion. Resp VS WNL. GI/: Voiding. Urine is yellow and clear. Last BM: 9/22/19. Miralax ordered PRN. No c/o N/V. Diet: Tolerating diet. Adequate intake noted. See I&O for further details. Ambulation: Pt ambulates independently. Up walking halls. No falls during the shift.  
  
Additional Information: Dacogen due today 9/23 currently on hold. Patient is still receiving Gilteritinib 120mg PO daily. 1 unit of PRBCs administered.  at bedside. Increased petechiae surrounding L chest port. Repeat BCX at 72 hours ? Continue current POC. Report given to Salma Meng RN.

## 2019-09-24 ENCOUNTER — APPOINTMENT (OUTPATIENT)
Dept: GENERAL RADIOLOGY | Age: 74
DRG: 809 | End: 2019-09-24
Attending: NURSE PRACTITIONER
Payer: MEDICARE

## 2019-09-24 LAB
ANION GAP SERPL CALC-SCNC: 6 MMOL/L (ref 7–16)
BACTERIA SPEC CULT: NORMAL
BUN SERPL-MCNC: 10 MG/DL (ref 8–23)
CALCIUM SERPL-MCNC: 7.7 MG/DL (ref 8.3–10.4)
CHLORIDE SERPL-SCNC: 113 MMOL/L (ref 98–107)
CO2 SERPL-SCNC: 23 MMOL/L (ref 21–32)
CREAT SERPL-MCNC: 0.71 MG/DL (ref 0.6–1)
DIFFERENTIAL METHOD BLD: ABNORMAL
ERYTHROCYTE [DISTWIDTH] IN BLOOD BY AUTOMATED COUNT: 14.6 % (ref 11.9–14.6)
GLUCOSE SERPL-MCNC: 90 MG/DL (ref 65–100)
HCT VFR BLD AUTO: 21.5 % (ref 35.8–46.3)
HGB BLD-MCNC: 7.4 G/DL (ref 11.7–15.4)
MCH RBC QN AUTO: 30.2 PG (ref 26.1–32.9)
MCHC RBC AUTO-ENTMCNC: 34.4 G/DL (ref 31.4–35)
MCV RBC AUTO: 87.8 FL (ref 79.6–97.8)
NRBC # BLD: 0 K/UL (ref 0–0.2)
PLATELET # BLD AUTO: 12 K/UL (ref 150–450)
PLATELET COMMENTS,PCOM: ABNORMAL
PMV BLD AUTO: 9.9 FL (ref 9.4–12.3)
POTASSIUM SERPL-SCNC: 3.5 MMOL/L (ref 3.5–5.1)
RBC # BLD AUTO: 2.45 M/UL (ref 4.05–5.2)
RBC MORPH BLD: ABNORMAL
SERVICE CMNT-IMP: NORMAL
SODIUM SERPL-SCNC: 142 MMOL/L (ref 136–145)
VANCOMYCIN TROUGH SERPL-MCNC: 9.4 UG/ML (ref 5–20)
WBC # BLD AUTO: 0.5 K/UL (ref 4.3–11.1)
WBC MORPH BLD: ABNORMAL

## 2019-09-24 PROCEDURE — 80048 BASIC METABOLIC PNL TOTAL CA: CPT

## 2019-09-24 PROCEDURE — 65270000029 HC RM PRIVATE

## 2019-09-24 PROCEDURE — 74011250636 HC RX REV CODE- 250/636: Performed by: INTERNAL MEDICINE

## 2019-09-24 PROCEDURE — 36591 DRAW BLOOD OFF VENOUS DEVICE: CPT

## 2019-09-24 PROCEDURE — 74011250637 HC RX REV CODE- 250/637: Performed by: INTERNAL MEDICINE

## 2019-09-24 PROCEDURE — 71046 X-RAY EXAM CHEST 2 VIEWS: CPT

## 2019-09-24 PROCEDURE — 85025 COMPLETE CBC W/AUTO DIFF WBC: CPT

## 2019-09-24 PROCEDURE — 87040 BLOOD CULTURE FOR BACTERIA: CPT

## 2019-09-24 PROCEDURE — 74011250637 HC RX REV CODE- 250/637: Performed by: NURSE PRACTITIONER

## 2019-09-24 PROCEDURE — 80202 ASSAY OF VANCOMYCIN: CPT

## 2019-09-24 PROCEDURE — 36415 COLL VENOUS BLD VENIPUNCTURE: CPT

## 2019-09-24 PROCEDURE — 77030020255 HC SOL INJ LR 1000ML BG

## 2019-09-24 PROCEDURE — 74011000258 HC RX REV CODE- 258: Performed by: INTERNAL MEDICINE

## 2019-09-24 RX ORDER — VANCOMYCIN HYDROCHLORIDE
1250 EVERY 12 HOURS
Status: DISCONTINUED | OUTPATIENT
Start: 2019-09-24 | End: 2019-09-26

## 2019-09-24 RX ADMIN — VANCOMYCIN HYDROCHLORIDE 1500 MG: 10 INJECTION, POWDER, LYOPHILIZED, FOR SOLUTION INTRAVENOUS at 05:17

## 2019-09-24 RX ADMIN — POTASSIUM CHLORIDE 20 MEQ: 20 TABLET, EXTENDED RELEASE ORAL at 08:17

## 2019-09-24 RX ADMIN — ACYCLOVIR 400 MG: 800 TABLET ORAL at 15:28

## 2019-09-24 RX ADMIN — PIPERACILLIN SODIUM AND TAZOBACTAM SODIUM 4.5 G: 4; .5 INJECTION, POWDER, LYOPHILIZED, FOR SOLUTION INTRAVENOUS at 12:41

## 2019-09-24 RX ADMIN — ACETAMINOPHEN 650 MG: 325 TABLET, FILM COATED ORAL at 03:19

## 2019-09-24 RX ADMIN — POTASSIUM & SODIUM PHOSPHATES POWDER PACK 280-160-250 MG 1 PACKET: 280-160-250 PACK at 15:28

## 2019-09-24 RX ADMIN — POTASSIUM CHLORIDE 20 MEQ: 20 TABLET, EXTENDED RELEASE ORAL at 15:28

## 2019-09-24 RX ADMIN — POTASSIUM & SODIUM PHOSPHATES POWDER PACK 280-160-250 MG 1 PACKET: 280-160-250 PACK at 21:15

## 2019-09-24 RX ADMIN — PRAVASTATIN SODIUM 10 MG: 20 TABLET ORAL at 21:14

## 2019-09-24 RX ADMIN — PIPERACILLIN SODIUM,TAZOBACTAM SODIUM 3.38 G: 3; .375 INJECTION, POWDER, FOR SOLUTION INTRAVENOUS at 03:12

## 2019-09-24 RX ADMIN — ACYCLOVIR 400 MG: 800 TABLET ORAL at 08:17

## 2019-09-24 RX ADMIN — DULOXETINE 30 MG: 30 CAPSULE, DELAYED RELEASE ORAL at 08:17

## 2019-09-24 RX ADMIN — ACETAMINOPHEN 650 MG: 325 TABLET, FILM COATED ORAL at 23:06

## 2019-09-24 RX ADMIN — LORAZEPAM 1 MG: 1 TABLET ORAL at 21:16

## 2019-09-24 RX ADMIN — POTASSIUM & SODIUM PHOSPHATES POWDER PACK 280-160-250 MG 1 PACKET: 280-160-250 PACK at 08:18

## 2019-09-24 RX ADMIN — Medication 10 ML: at 21:15

## 2019-09-24 RX ADMIN — DIPHENHYDRAMINE HYDROCHLORIDE 50 MG: 25 CAPSULE ORAL at 21:15

## 2019-09-24 RX ADMIN — PIPERACILLIN SODIUM AND TAZOBACTAM SODIUM 4.5 G: 4; .5 INJECTION, POWDER, LYOPHILIZED, FOR SOLUTION INTRAVENOUS at 20:51

## 2019-09-24 RX ADMIN — ACETAMINOPHEN 650 MG: 325 TABLET, FILM COATED ORAL at 13:10

## 2019-09-24 RX ADMIN — VANCOMYCIN HYDROCHLORIDE 1250 MG: 10 INJECTION, POWDER, LYOPHILIZED, FOR SOLUTION INTRAVENOUS at 18:00

## 2019-09-24 NOTE — PROGRESS NOTES
END OF SHIFT NOTE: 
 
Intake/Output 
09/23 1901 - 09/24 0700 In: 0 [P.O.:360; I.V.:520] Out: 1100 [Urine:1100] Voiding: YES Catheter: NO 
Drain:   
 
 
 
 
 
Stool:  0 occurrences. Stool Assessment Stool Color: Savannah Havers (09/22/19 1524) Stool Appearance: Formed (09/23/19 0815) Stool Amount: Small (09/22/19 1524) Stool Source/Status: Rectum (09/22/19 1524) Emesis:  0 occurrences. VITAL SIGNS Patient Vitals for the past 12 hrs: 
 Temp Pulse Resp BP SpO2  
09/24/19 0402 100.3 °F (37.9 °C)      
09/24/19 0317 (!) 100.6 °F (38.1 °C) 82 16 143/71 96 %  
09/23/19 2339 99.9 °F (37.7 °C) 85 16 145/65 96 %  
09/23/19 2133 100.4 °F (38 °C)      
09/23/19 2008 (!) 100.8 °F (38.2 °C) 87 16 142/61 99 % Pain Assessment Pain 1 Pain Scale 1: Numeric (0 - 10) (09/24/19 0210) Pain Intensity 1: 0 (09/24/19 0210) Patient Stated Pain Goal: 0 (09/24/19 0210) Pain Reassessment 1: Patient resting w/respiratory rate greater than 10 (09/23/19 0419) Pain Onset 1: 20 minutes (09/23/19 0334) Pain Location 1: Head (09/23/19 0334) Pain Orientation 1: Anterior (09/23/19 0334) Pain Description 1: Dull (09/23/19 0334) Pain Intervention(s) 1: Medication (see MAR) (09/23/19 0334) Ambulating Yes Additional Information: TMax 100.8. No bleeding. Shift report given to oncoming nurse KAM Hayes at the bedside.  
 
Beth Gutierrez RN

## 2019-09-24 NOTE — PROGRESS NOTES
[facility] Hematology & Oncology Inpatient Hematology / Oncology Progress Note Admission Date: 2019  6:30 PM 
Reason for Admission/Hospital Course: Febrile neutropenia ([address] [D70.9, R50.81] 24 Hour Events: 
Feeling better today 
tmax 100.8 BC/UC NTD 
 
ROS: 
Constitutional: negative for fever, chills, weakness, malaise, fatigue. CV:  negative for chest pain, palpitations, edema. Respiratory:  negative for dyspnea, cough, wheezing. GI: negative for nausea, abdominal pain, diarrhea. 10 point review of systems is otherwise negative with the exception of the elements mentioned above in the HPI. No Known Allergies OBJECTIVE: 
Patient Vitals for the past 8 hrs: 
 BP Temp Pulse Resp SpO2  
19 0746 119/57 98.9 °F (37.2 °C) 79 18 97 % Temp (24hrs), Av.5 °F (38.1 °C), Min:98.9 °F (37.2 °C), Max:101.9 °F (38.8 °C) 
 
 0701 -  1900 In: -  
Out: 3060 [facility] Physical Exam: 
Constitutional: Well developed, well nourished female in no acute distress, sitting comfortably in the hospital bed. HEENT: Normocephalic and atraumatic. Oropharynx is clear, mucous membranes are moist.  Pupils are equal, round, and reactive to light. Extraocular muscles are intact. Sclerae anicteric. Skin Warm and dry. No bruising and no rash noted. No erythema. No pallor. Respiratory Lungs are clear to auscultation bilaterally without wheezes, rales or rhonchi, normal air exchange without accessory muscle use. CVS Normal rate, regular rhythm and normal S1 and S2. No murmurs, gallops, or rubs. Abdomen Soft, nontender and nondistended, normoactive bowel sounds. No palpable mass. No hepatosplenomegaly. Neuro Grossly nonfocal with no obvious sensory or motor deficits. MSK Normal range of motion in general.  No edema and no tenderness. Psych Appropriate mood and affect. Labs: 
   
Recent Labs  
  19 
0401 19 
0328 19 
0308 WBC 0.5* 0.5* 0.6*  
RBC 2.45* 2.14* 2.40* HGB 7.4* 6.5* 7.2* HCT 21.5* 18.8* 20.7* MCV 87.8 87.9 86.3 MCH 30.2 30.4 30.0 MCHC 34.4 34.6 34.8  
RDW 14.6 14.6 14.4 PLT 12* 17* 30* GRANS  --   --  3*  
LYMPH  --   --  89* MONOS  --   --  8  
EOS  --   --  0* BASOS  --   --  0 IG  --   --  0  
DF MANUAL MANUAL AUTOMATED ANEU  --   --  0.0* ABL  --   --  0.6 ABM  --   --  0.0* ALEKSANDR  --   --  0.0 ABB  --   --  0.0 AIG  --   --  0.0 Recent Labs  
  09/24/19 
0401 09/23/19 
0328 09/22/19 
0308 09/21/19 
1850  143 143 141  
K 3.5 3.4* 3.6 3.8 * 112* 110* 107 CO2 23 26 27 28 AGAP 6* 5* 6* 6*  
GLU 90 95 93 136* BUN 10 11 16 17 CREA 0.71 0.83 0.94 1.05* GFRAA >60 >60 >60 >60 GFRNA >60 >60 >60 55* CA 7.7* 7.6* 8.3 8.5 SGOT  --  29 29 32 AP  --  81 88 94 TP  --  5.9* 6.2* 6.5 ALB  --  2.5* 2.9* 3.0*  
GLOB  --  3.4 3.3 3.5 AGRAT  --  0.7* 0.9* 0.9* MG  --  1.8  --   --   
PHOS  --  2.0*  --   --   
 
 
 
Imaging: 
 
Medications: 
Current Facility-Administered Medications Medication Dose Route Frequency  vancomycin (VANCOCIN) 1250 mg in  ml infusion  1,250 mg IntraVENous Q12H  piperacillin-tazobactam (ZOSYN) 4.5 g in 0.9% sodium chloride (MBP/ADV) 100 mL  4.5 g IntraVENous Q8H  potassium chloride (K-DUR, KLOR-CON) SR tablet 20 mEq  20 mEq Oral BID  potassium, sodium phosphates (NEUTRA-PHOS) packet 1 Packet  1 Packet Oral TID  polyethylene glycol (MIRALAX) packet 17 g  17 g Oral DAILY PRN  
 acetaminophen (TYLENOL) tablet 650 mg  650 mg Oral Q6H PRN  
 gilteritinib tab (Xospata) 120 mg = 3 tabs  (Patient Supplied)  120 mg Oral PCL  sodium chloride (NS) flush 5-10 mL  5-10 mL IntraVENous PRN  
 0.9% sodium chloride infusion  50 mL/hr IntraVENous CONTINUOUS  
 DULoxetine (CYMBALTA) capsule 30 mg  30 mg Oral DAILY  LORazepam (ATIVAN) tablet 1 mg  1 mg Oral QHS  pravastatin (PRAVACHOL) tablet 10 mg  10 mg Oral QHS  zolpidem (AMBIEN) tablet 5 mg  5 mg Oral QHS PRN  
 acyclovir (ZOVIRAX) tablet 400 mg  400 mg Oral BID  influenza vaccine 2019-20 (6 mos+)(PF) (FLUARIX/FLULAVAL/FLUZONE QUAD) injection 0.5 mL  0.5 mL IntraMUSCular PRIOR TO DISCHARGE  diphenhydrAMINE (BENADRYL) capsule 50 mg  50 mg Oral QHS PRN Facility-Administered Medications Ordered in Other Encounters Medication Dose Route Frequency  0.9% sodium chloride infusion 250 mL  250 mL IntraVENous PRN  
 sodium chloride (NS) flush 10-40 mL  10-40 mL IntraVENous PRN  
 
 
 
ASSESSMENT: 
 
Problem List  Date Reviewed: 9/19/2019 Codes Class Noted * (Principal) Febrile neutropenia (HCC) ICD-10-CM: D70.9, R50.81 ICD-9-CM: 288.00, 780.61  9/21/2019 Pancytopenia due to antineoplastic chemotherapy [facility]) ICD-10-CM: D61.810, T45.1X5A 
ICD-9-CM: 284.11, E933.1  6/12/2019 Cellulitis of neck ICD-10-CM: H28.135 ICD-9-CM: 682.1  6/12/2019 Immunocompromised status associated with infection ([facility] ICD-10-CM: D84.9, B99.9 ICD-9-CM: 279.3, 136.9  6/12/2019 Port or reservoir infection ICD-10-CM: E61.434A ICD-9-CM: 999.33  6/12/2019 Acute myeloid leukemia not having achieved remission ([facility] ICD-10-CM: C92.00 ICD-9-CM: 205.00  5/9/2019 Admission for antineoplastic chemotherapy ICD-10-CM: Z51.11 ICD-9-CM: V58.11  5/5/2019 AML (acute myeloblastic leukemia) ([facility] ICD-10-CM: C92.00 ICD-9-CM: 205.00  4/28/2019 Weakness generalized ICD-10-CM: R53.1 ICD-9-CM: 780.79  4/28/2019 Pancytopenia ([facility] ICD-10-CM: W96.909 ICD-9-CM: 284.19  4/28/2019 Thrombocytopenia ([facility] ICD-10-CM: D69.6 ICD-9-CM: 287.5  4/27/2019 Ms. Ashanti Brown is a 68year old female who was admitted on 9/21/2019 for neutropenic fever. She has been having intermittent low grade fevers over the last week.   She came to infusion yesterday for a blood transfusion, and after going home she had a fever of 102.5 and was sent to the ER for evaluation. She is a known patient of Dr. Laith Montgomery with AML. Initially treated with induction 7+3 and Midostaurin. Most recently on Decitabine plus Gilteritinib with cycle 2 beginning on 8/26/2019 and cycle 3 due 9/23/2019. CXR negative. UA clear. BC/UC NTD. Started on zosyn and vanc. PLAN: 
AML 
-On Dacogen/Gilteritinib with most recent cycle 2 on 8/26/2019 with cycle 3 due 9/23/2019 9/23 Discuss BMbx flow results with patient-persistent AML-58% blasts. Still waiting on final pathology. 9/24 Bone marrow biopsy final path still pending.  
  
Neutropenic Fever 
-UA clear, BC/UC NTD 
-On Zosyn/Vanc empirically 9/23 Tmax 100.8. Feeling better today. Day 2 zosyn and vanc. BC/UC NTD 
9/24 Tmax 100.6. Day 3 vanc/zosyn. BC/UC still NGTD. Pancytopenia related to chemo 9/23 hgb 6.5 transfuse per [facility] SOPs 
9/24 Hgb up to 7.4. Goals and plan of care reviewed with the patient. All questions answered to the best of our ability. Rosie \"Rosie Duenas\" GEETA Montoya [facility] Hematology and Oncology [address] [place] [address] Office : [phone] Fax : [phone] Attending Addendum: 
Patient seen with NP. Ms Jose Reyes is a 79yo woman admitted on 9/21 with neutropenic fever. Pt of Dr Arabella Miller with known AML. S/p induction 7+3/Midostaurin and most recently on Decitabine and Gilteritinib. C 3 was due 9/23/19, and C2 started on 8/26/19. She presented to [facility] infusion for blood transfusion and upon arriving home developed fevers of 102.5 and presented to ED for further evaluation. She was started on broad spectrum antibiotics. Infx workup neg thus far. She feels better today. Tmax 100.8 overnight. Otherwise VSS. Labs reviewed, transfuse per protocol. BMbx with flow blasts at 58%. Awaiting final path.   D/w Dr Laith Montgomery - primary oncologist, will stay the course once recovers from neutropenic fevers. I personally performed a face to face diagnostic evaluation on this patient. My findings are as follows: A&ox3, lungs clear, heart regular, abdomen benign and no LE edema. C/w supportive care.    
  
I have reviewed and agree with the care plan.     
  
  
 
  
Johnnie Valdivia MD 
[facility] Hematology and Oncology [address] [place] [address] Office : [phone] Fax : [phone]

## 2019-09-24 NOTE — PROGRESS NOTES
Pharmacokinetic Consult to Pharmacist 
 
Somwestley Brown is a 68 y.o. female being treated for febrile neutropenia with vancomycin and pip/tazo. Height: 5' 6\" (167.6 cm)  Weight: 91.5 kg (201 lb 12.8 oz) Lab Results Component Value Date/Time BUN 10 09/24/2019 04:01 AM  
 Creatinine 0.71 09/24/2019 04:01 AM  
 WBC 0.5 (LL) 09/24/2019 04:01 AM  
 Procalcitonin 0.1 09/21/2019 06:50 PM  
 Lactic Acid (POC) 0.67 09/21/2019 08:51 PM  
  
Estimated Creatinine Clearance: 80.4 mL/min (based on SCr of 0.71 mg/dL). Lab Results Component Value Date/Time Vancomycin,trough 9.4 09/24/2019 04:01 AM  
 
 
Day 3 of vancomycin. Goal trough is 15-20. Trough resulted below the therapeutic range. Will increase vancomycin to 1250 mg IV q12h. Further levels will be ordered as clinically indicated. Pharmacy will continue to follow. Please call with any questions. Thank you, Gil Lozada, PharmD Clinical Pharmacist 
[phone]

## 2019-09-25 LAB
ANION GAP SERPL CALC-SCNC: 6 MMOL/L (ref 7–16)
BUN SERPL-MCNC: 9 MG/DL (ref 8–23)
CALCIUM SERPL-MCNC: 7.7 MG/DL (ref 8.3–10.4)
CHLORIDE SERPL-SCNC: 114 MMOL/L (ref 98–107)
CO2 SERPL-SCNC: 24 MMOL/L (ref 21–32)
CREAT SERPL-MCNC: 0.75 MG/DL (ref 0.6–1)
DIFFERENTIAL METHOD BLD: ABNORMAL
ERYTHROCYTE [DISTWIDTH] IN BLOOD BY AUTOMATED COUNT: 14.6 % (ref 11.9–14.6)
GLUCOSE SERPL-MCNC: 104 MG/DL (ref 65–100)
HCT VFR BLD AUTO: 20.5 % (ref 35.8–46.3)
HGB BLD-MCNC: 6.9 G/DL (ref 11.7–15.4)
MCH RBC QN AUTO: 29.9 PG (ref 26.1–32.9)
MCHC RBC AUTO-ENTMCNC: 33.7 G/DL (ref 31.4–35)
MCV RBC AUTO: 88.7 FL (ref 79.6–97.8)
NRBC # BLD: 0 K/UL (ref 0–0.2)
PLATELET # BLD AUTO: 5 K/UL (ref 150–450)
PLATELET COMMENTS,PCOM: ABNORMAL
PMV BLD AUTO: ABNORMAL FL (ref 9.4–12.3)
POTASSIUM SERPL-SCNC: 3.7 MMOL/L (ref 3.5–5.1)
RBC # BLD AUTO: 2.31 M/UL (ref 4.05–5.2)
RBC MORPH BLD: ABNORMAL
SODIUM SERPL-SCNC: 144 MMOL/L (ref 136–145)
WBC # BLD AUTO: 0.4 K/UL (ref 4.3–11.1)
WBC MORPH BLD: ABNORMAL

## 2019-09-25 PROCEDURE — 74011250637 HC RX REV CODE- 250/637: Performed by: INTERNAL MEDICINE

## 2019-09-25 PROCEDURE — 85025 COMPLETE CBC W/AUTO DIFF WBC: CPT

## 2019-09-25 PROCEDURE — 74011250636 HC RX REV CODE- 250/636: Performed by: INTERNAL MEDICINE

## 2019-09-25 PROCEDURE — P9040 RBC LEUKOREDUCED IRRADIATED: HCPCS

## 2019-09-25 PROCEDURE — 74011250636 HC RX REV CODE- 250/636: Performed by: NURSE PRACTITIONER

## 2019-09-25 PROCEDURE — 36415 COLL VENOUS BLD VENIPUNCTURE: CPT

## 2019-09-25 PROCEDURE — 86644 CMV ANTIBODY: CPT

## 2019-09-25 PROCEDURE — 77030020263 HC SOL INJ SOD CL0.9% LFCR 1000ML

## 2019-09-25 PROCEDURE — 30243R1 TRANSFUSION OF NONAUTOLOGOUS PLATELETS INTO CENTRAL VEIN, PERCUTANEOUS APPROACH: ICD-10-PCS | Performed by: INTERNAL MEDICINE

## 2019-09-25 PROCEDURE — 74011250637 HC RX REV CODE- 250/637: Performed by: NURSE PRACTITIONER

## 2019-09-25 PROCEDURE — P9037 PLATE PHERES LEUKOREDU IRRAD: HCPCS

## 2019-09-25 PROCEDURE — 65270000029 HC RM PRIVATE

## 2019-09-25 PROCEDURE — 87449 NOS EACH ORGANISM AG IA: CPT

## 2019-09-25 PROCEDURE — 80048 BASIC METABOLIC PNL TOTAL CA: CPT

## 2019-09-25 PROCEDURE — 36591 DRAW BLOOD OFF VENOUS DEVICE: CPT

## 2019-09-25 PROCEDURE — 99233 SBSQ HOSP IP/OBS HIGH 50: CPT | Performed by: INTERNAL MEDICINE

## 2019-09-25 PROCEDURE — 74011000258 HC RX REV CODE- 258: Performed by: INTERNAL MEDICINE

## 2019-09-25 PROCEDURE — 87305 ASPERGILLUS AG IA: CPT

## 2019-09-25 PROCEDURE — 36430 TRANSFUSION BLD/BLD COMPNT: CPT

## 2019-09-25 RX ORDER — ACETAMINOPHEN 10 MG/ML
1000 INJECTION, SOLUTION INTRAVENOUS ONCE
Status: COMPLETED | OUTPATIENT
Start: 2019-09-25 | End: 2019-09-25

## 2019-09-25 RX ORDER — VORICONAZOLE 200 MG/1
200 TABLET, FILM COATED ORAL EVERY 12 HOURS
Status: DISCONTINUED | OUTPATIENT
Start: 2019-09-25 | End: 2019-10-10 | Stop reason: HOSPADM

## 2019-09-25 RX ORDER — SODIUM CHLORIDE 9 MG/ML
250 INJECTION, SOLUTION INTRAVENOUS AS NEEDED
Status: DISCONTINUED | OUTPATIENT
Start: 2019-09-25 | End: 2019-09-30 | Stop reason: SDUPTHER

## 2019-09-25 RX ORDER — ACETAMINOPHEN 500 MG
1000 TABLET ORAL ONCE
Status: DISCONTINUED | OUTPATIENT
Start: 2019-09-25 | End: 2019-09-25

## 2019-09-25 RX ADMIN — POTASSIUM CHLORIDE 20 MEQ: 20 TABLET, EXTENDED RELEASE ORAL at 08:09

## 2019-09-25 RX ADMIN — POTASSIUM & SODIUM PHOSPHATES POWDER PACK 280-160-250 MG 1 PACKET: 280-160-250 PACK at 21:36

## 2019-09-25 RX ADMIN — POTASSIUM CHLORIDE 20 MEQ: 20 TABLET, EXTENDED RELEASE ORAL at 17:42

## 2019-09-25 RX ADMIN — VORICONAZOLE 200 MG: 200 TABLET, FILM COATED ORAL at 11:13

## 2019-09-25 RX ADMIN — DIPHENHYDRAMINE HYDROCHLORIDE 50 MG: 25 CAPSULE ORAL at 09:25

## 2019-09-25 RX ADMIN — VANCOMYCIN HYDROCHLORIDE 1250 MG: 10 INJECTION, POWDER, LYOPHILIZED, FOR SOLUTION INTRAVENOUS at 17:52

## 2019-09-25 RX ADMIN — POTASSIUM & SODIUM PHOSPHATES POWDER PACK 280-160-250 MG 1 PACKET: 280-160-250 PACK at 08:09

## 2019-09-25 RX ADMIN — ACYCLOVIR 400 MG: 800 TABLET ORAL at 08:08

## 2019-09-25 RX ADMIN — VANCOMYCIN HYDROCHLORIDE 1250 MG: 10 INJECTION, POWDER, LYOPHILIZED, FOR SOLUTION INTRAVENOUS at 05:45

## 2019-09-25 RX ADMIN — PRAVASTATIN SODIUM 10 MG: 20 TABLET ORAL at 21:37

## 2019-09-25 RX ADMIN — ACETAMINOPHEN 650 MG: 325 TABLET, FILM COATED ORAL at 20:08

## 2019-09-25 RX ADMIN — PIPERACILLIN SODIUM AND TAZOBACTAM SODIUM 4.5 G: 4; .5 INJECTION, POWDER, LYOPHILIZED, FOR SOLUTION INTRAVENOUS at 11:13

## 2019-09-25 RX ADMIN — SODIUM CHLORIDE 50 ML/HR: 900 INJECTION, SOLUTION INTRAVENOUS at 05:54

## 2019-09-25 RX ADMIN — POTASSIUM & SODIUM PHOSPHATES POWDER PACK 280-160-250 MG 1 PACKET: 280-160-250 PACK at 17:42

## 2019-09-25 RX ADMIN — ACYCLOVIR 400 MG: 800 TABLET ORAL at 17:42

## 2019-09-25 RX ADMIN — ACETAMINOPHEN 1000 MG: 10 INJECTION, SOLUTION INTRAVENOUS at 11:38

## 2019-09-25 RX ADMIN — LORAZEPAM 1 MG: 1 TABLET ORAL at 21:37

## 2019-09-25 RX ADMIN — DULOXETINE 30 MG: 30 CAPSULE, DELAYED RELEASE ORAL at 08:09

## 2019-09-25 RX ADMIN — VORICONAZOLE 200 MG: 200 TABLET, FILM COATED ORAL at 19:58

## 2019-09-25 RX ADMIN — PIPERACILLIN SODIUM AND TAZOBACTAM SODIUM 4.5 G: 4; .5 INJECTION, POWDER, LYOPHILIZED, FOR SOLUTION INTRAVENOUS at 03:43

## 2019-09-25 RX ADMIN — ACETAMINOPHEN 650 MG: 325 TABLET, FILM COATED ORAL at 08:08

## 2019-09-25 RX ADMIN — PIPERACILLIN SODIUM AND TAZOBACTAM SODIUM 4.5 G: 4; .5 INJECTION, POWDER, LYOPHILIZED, FOR SOLUTION INTRAVENOUS at 19:57

## 2019-09-25 RX ADMIN — DIPHENHYDRAMINE HYDROCHLORIDE 50 MG: 25 CAPSULE ORAL at 21:37

## 2019-09-25 NOTE — PROGRESS NOTES
END OF SHIFT NOTE: 
 
Intake/Output 
09/24 1901 - 09/25 0700 In: 6878 [P.O.:980; I.V.:530] Out: 1350 [UHSER:0383] Voiding: YES Catheter: NO 
Drain:   
 
 
 
 
 
Stool:  2 occurrences. Stool Assessment Stool Color: Debra Duet (09/25/19 0007) Stool Appearance: Loose (09/25/19 0007) Stool Amount: Small (09/25/19 0007) Stool Source/Status: Rectum; Incontinence (09/25/19 0007) Emesis:  0 occurrences. VITAL SIGNS Patient Vitals for the past 12 hrs: 
 Temp Pulse Resp BP SpO2  
09/25/19 0246 98.4 °F (36.9 °C) 78 16 118/53 96 %  
09/25/19 0045 99.4 °F (37.4 °C)      
09/25/19 0006 (!) 102 °F (38.9 °C)      
09/24/19 2238 (!) 102.9 °F (39.4 °C) 96 20 143/64 93 % 09/24/19 1916 98.7 °F (37.1 °C) 93 20 148/70 100 % 09/24/19 1733 98.8 °F (37.1 °C) 88 20 132/61 100 % Pain Assessment Pain 1 Pain Scale 1: Numeric (0 - 10) (09/25/19 0115) Pain Intensity 1: 0 (09/25/19 0115) Patient Stated Pain Goal: 0 (09/25/19 0115) Pain Reassessment 1: Patient resting w/respiratory rate greater than 10 (09/24/19 1400) Pain Onset 1: 20 minutes (09/23/19 0334) Pain Location 1: Head (09/23/19 0334) Pain Orientation 1: Anterior (09/23/19 0334) Pain Description 1: Dull (09/23/19 0334) Pain Intervention(s) 1: Medication (see MAR) (09/23/19 0334) Ambulating Yes Additional Information: TMax 102.9. Rpt blood cultures already done 9/24. Falls precautions reinforced after incident of near fall. For 1 unit PRBC (hgb 6.9) & 1 unit platelet (plt ct 5) today per protocol. Shift report given to oncoming nurse Rae RN at the bedside.  
 
Jessica Ira, RN

## 2019-09-25 NOTE — PROGRESS NOTES
Problem: Falls - Risk of 
Goal: *Absence of Falls Description Document Mariel Ruts Fall Risk and appropriate interventions in the flowsheet. Outcome: Progressing Towards Goal 
Note:  
Fall Risk Interventions: 
Mobility Interventions: Communicate number of staff needed for ambulation/transfer, Patient to call before getting OOB Medication Interventions: Patient to call before getting OOB Elimination Interventions: Call light in reach, Patient to call for help with toileting needs History of Falls Interventions: Room close to nurse's station Problem: Patient Education: Go to Patient Education Activity Goal: Patient/Family Education Outcome: Progressing Towards Goal 
  
Problem: Body Temperature -  Risk of, Imbalanced Goal: *Absence of heat stress or hyperthermia signs and symptoms Outcome: Progressing Towards Goal 
  
Problem: Patient Education: Go to Patient Education Activity Goal: Patient/Family Education Outcome: Progressing Towards Goal 
  
Problem: Anemia Care Plan (Adult and Pediatric) Goal: *Labs within defined limits Outcome: Progressing Towards Goal 
Goal: *Tolerates increased activity Outcome: Progressing Towards Goal 
  
Problem: Patient Education: Go to Patient Education Activity Goal: Patient/Family Education Outcome: Progressing Towards Goal

## 2019-09-25 NOTE — PROGRESS NOTES
[facility] Inpatient Hematology / Oncology Progress Note Admission Date: 2019  6:30 PM 
Reason for Admission/Hospital Course: Febrile neutropenia ([facility] [address] [D70.9, R50.81] 24 Hour Events: 
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 
Constitutional: negative for fever, chills, weakness, malaise, fatigue. CV:  negative for chest pain, palpitations, edema. Respiratory:  negative for dyspnea, cough, wheezing. GI: negative for nausea, abdominal pain, diarrhea. 10 point review of systems is otherwise negative with the exception of the elements mentioned above in the HPI. No Known Allergies OBJECTIVE: 
Patient Vitals for the past 8 hrs: 
 BP Temp Pulse Resp SpO2  
19 1052  (!) 102.2 °F (39 °C)     
19 0940 138/60 (!) 101.7 °F (38.7 °C) 84 17 97 % 19 0910  100 °F (37.8 °C)     
19 0740 138/60 100.2 °F (37.9 °C) 72 18 96 %  
19 0551  99.3 °F (37.4 °C)    Temp (24hrs), Av.5 °F (38.1 °C), Min:98.4 °F (36.9 °C), Max:102.9 °F (39.4 °C) 
 
 0701 -  1900 In: 600 [P.O.:600] Out: - Physical Exam: 
Constitutional: Well developed, well nourished female in no acute distress, sitting comfortably in the hospital bed. HEENT: Normocephalic and atraumatic. Oropharynx is clear, mucous membranes are moist.  Pupils are equal, round, and reactive to light. Extraocular muscles are intact. Sclerae anicteric. Skin Warm and dry. No bruising and no rash noted. No erythema. + pallor Respiratory Lungs are clear to auscultation bilaterally without wheezes, rales or rhonchi, normal air exchange without accessory muscle use. CVS Normal rate, regular rhythm and normal S1 and S2. No murmurs, gallops, or rubs. Abdomen Soft, nontender and nondistended, normoactive bowel sounds. No palpable mass. No hepatosplenomegaly. Neuro Grossly nonfocal with no obvious sensory or motor deficits. MSK Normal range of motion in general.  No edema and no tenderness. Psych Appropriate mood and affect. Labs: 
   
Recent Labs  
  09/25/19 0239 09/24/19 
0401 09/23/19 
4003 WBC 0.4* 0.5* 0.5* RBC 2.31* 2.45* 2.14* HGB 6.9* 7.4* 6.5* HCT 20.5* 21.5* 18.8* MCV 88.7 87.8 87.9 MCH 29.9 30.2 30.4 MCHC 33.7 34.4 34.6  
RDW 14.6 14.6 14.6 PLT 5* 12* 17* DF MANUAL MANUAL MANUAL Recent Labs  
  09/25/19 0239 09/24/19 
0401 09/23/19 
3311  142 143  
K 3.7 3.5 3.4*  
* 113* 112* CO2 24 23 26 AGAP 6* 6* 5* * 90 95 BUN 9 10 11 CREA 0.75 0.71 0.83 GFRAA >60 >60 >60 GFRNA >60 >60 >60  
CA 7.7* 7.7* 7.6* SGOT  --   --  29  
AP  --   --  81  
TP  --   --  5.9* ALB  --   --  2.5*  
GLOB  --   --  3.4 AGRAT  --   --  0.7* MG  --   --  1.8 PHOS  --   --  2.0* Imaging: XR CHEST PA LAT [[id number]] Collected: 09/24/19 1617 Order Status: Completed Updated: 09/24/19 1620 Narrative:    
CHEST X-RAY, 2 views 9/24/2019 History: Tachypnea and fever. Technique: PA and lateral views of the chest.  
 
Comparison: Chest x-ray 9/21/2019 Findings: A stable left-sided venous port is seen. The cardiac silhouette is mildly 
enlarged although stable.  The lungs are expanded without evidence for 
pneumothorax.  No evolving consolidation, or evidence of pleural effusion is 
seen. The bony thorax demonstrates no acute changes.  The upper abdomen is 
unremarkable in appearance. Impression:    
IMPRESSION:  
1.  Stable cardiomegaly without evolving acute changes evident by plain film 
imaging. XR CHEST PA LAT [[id number]] Collected: 09/21/19 2042 Order Status: Completed Updated: 09/21/19 2047 Narrative:    
EXAM: Chest x-ray. INDICATION: Febrile neutropenia. COMPARISON: May 18, 2019. TECHNIQUE: Frontal and lateral x-rays of the chest were obtained. FINDINGS: The lungs are clear except for minimal linear atelectasis or scarring 
in the lateral left costophrenic angle. The cardiac size, mediastinal contour 
and pulmonary vasculature are within normal limits. No pneumothorax or pleural 
effusion is seen. A left chest wall infusion port catheter remains in place. Impression:    
IMPRESSION: No acute process. Medications: 
Current Facility-Administered Medications Medication Dose Route Frequency  0.9% sodium chloride infusion 250 mL  250 mL IntraVENous PRN  
 voriconazole (VFEND) tablet 200 mg  200 mg Oral Q12H  
 [START ON 9/26/2019] Vancomycin Trough Level Reminder   Other ONCE  
 vancomycin (VANCOCIN) 1250 mg in  ml infusion  1,250 mg IntraVENous Q12H  piperacillin-tazobactam (ZOSYN) 4.5 g in 0.9% sodium chloride (MBP/ADV) 100 mL  4.5 g IntraVENous Q8H  potassium chloride (K-DUR, KLOR-CON) SR tablet 20 mEq  20 mEq Oral BID  potassium, sodium phosphates (NEUTRA-PHOS) packet 1 Packet  1 Packet Oral TID  polyethylene glycol (MIRALAX) packet 17 g  17 g Oral DAILY PRN  
 acetaminophen (TYLENOL) tablet 650 mg  650 mg Oral Q6H PRN  
 gilteritinib tab (Xospata) 120 mg = 3 tabs  (Patient Supplied)  120 mg Oral PCL  sodium chloride (NS) flush 5-10 mL  5-10 mL IntraVENous PRN  
 0.9% sodium chloride infusion  50 mL/hr IntraVENous CONTINUOUS  
 DULoxetine (CYMBALTA) capsule 30 mg  30 mg Oral DAILY  LORazepam (ATIVAN) tablet 1 mg  1 mg Oral QHS  pravastatin (PRAVACHOL) tablet 10 mg  10 mg Oral QHS  zolpidem (AMBIEN) tablet 5 mg  5 mg Oral QHS PRN  
 acyclovir (ZOVIRAX) tablet 400 mg  400 mg Oral BID  influenza vaccine 2019-20 (6 mos+)(PF) (FLUARIX/FLULAVAL/FLUZONE QUAD) injection 0.5 mL  0.5 mL IntraMUSCular PRIOR TO DISCHARGE  diphenhydrAMINE (BENADRYL) capsule 50 mg  50 mg Oral QHS PRN  
 
 
 
ASSESSMENT: 
 
 Problem List  Date Reviewed: 9/19/2019 Codes Class Noted * (Principal) Febrile neutropenia (HCC) ICD-10-CM: D70.9, R50.81 ICD-9-CM: 288.00, 780.61  9/21/2019 Pancytopenia due to antineoplastic chemotherapy [facility]) ICD-10-CM: D61.810, T45.1X5A 
ICD-9-CM: 284.11, E933.1  6/12/2019 Cellulitis of neck ICD-10-CM: O97.630 ICD-9-CM: 682.1  6/12/2019 Immunocompromised status associated with infection ([facility] ICD-10-CM: D84.9, B99.9 ICD-9-CM: 279.3, 136.9  6/12/2019 Port or reservoir infection ICD-10-CM: X06.235H ICD-9-CM: 999.33  6/12/2019 Acute myeloid leukemia not having achieved remission ([facility] ICD-10-CM: C92.00 ICD-9-CM: 205.00  5/9/2019 Admission for antineoplastic chemotherapy ICD-10-CM: Z51.11 ICD-9-CM: V58.11  5/5/2019 AML (acute myeloblastic leukemia) ([facility] ICD-10-CM: C92.00 ICD-9-CM: 205.00  4/28/2019 Weakness generalized ICD-10-CM: R53.1 ICD-9-CM: 780.79  4/28/2019 Pancytopenia ([facility] ICD-10-CM: J72.276 ICD-9-CM: 284.19  4/28/2019 Thrombocytopenia ([facility] ICD-10-CM: D69.6 ICD-9-CM: 287.5  4/27/2019 Ms. Lula Ryan is a 68year old female who was admitted on 9/21/2019 for neutropenic fever. She has been having intermittent low grade fevers over the last week. She came to infusion yesterday for a blood transfusion, and after going home she had a fever of 102.5 and was sent to the ER for evaluation. She is a known patient of Dr. Shoshana Mcrae with AML. Initially treated with induction 7+3 and Midostaurin. Most recently on Decitabine plus Gilteritinib with cycle 2 beginning on 8/26/2019 and cycle 3 due 9/23/2019. CXR negative. UA clear. BC/UC NTD. Started on zosyn and vanc. PLAN: 
AML 
-On Dacogen/Gilteritinib with most recent cycle 2 on 8/26/2019 with cycle 3 due 9/23/2019 9/23 Discuss BMbx flow results with patient-persistent AML-58% blasts. Still waiting on final pathology. 9/24 Bone marrow biopsy final path still pending.  
  
Neutropenic Fever 
-UA clear, BC/UC NTD 
-On Zosyn/Vanc empirically 9/23 Tmax 100.8. Feeling better today. Day 2 zosyn and vanc. BC/UC NTD 
9/24 Tmax 100.6. Day 3 vanc/zosyn. BC/UC still NGTD.  
9/25 Check aspergillus, fungitell, CMV. Add antifungal - vfend. CXR and BCx repeated yesterday and negative Pancytopenia related to chemo 9/23 hgb 6.5 transfuse per [facility] SOPs 
9/24 Hgb up to 7.4.  
9/25 Plt today Goals and plan of care reviewed with the patient. All questions answered to the best of our ability. Karolina Doty NP [facility] On[facility] [address] [place] [address] Office : [phone] Fax : [phone] Attending Addendum: 
Patient seen with NP. Val Camarillo is a 79yo woman admitted on 9/21 with neutropenic fever.  Pt of Dr Radha Huntley with known AML.  S/p induction 7+3/Midostaurin and most recently on Decitabine and Gilteritinib.  C 3 was due 9/23/19, and C2 started on 8/26/19.  She presented to [facility] infusion for blood transfusion and upon arriving home developed fevers of 102.5 and presented to ED for further evaluation.  She was started on broad spectrum antibiotics.  Infx workup neg thus far.  checking aperg, fungitell, CMV. Adding vfend. CXR and bld cultures negative thus far. ?Gilteritinib as cause of fevers if all else negative? T max 102 overnight. Otherwise VSS. Labs reviewed, transfuse per protocol.  BMbx with flow blasts at 58%. Awaiting final path from [facility].  D/w Dr Mikki Greenberg - primary oncologist, will stay the course. I personally performed a face to face diagnostic evaluation on this patient.  My findings are as follows: A&ox3, sad, feels ok this am, lungs clear, heart regular, abdomen benign and no LE edema.  C/w supportive care.  Transfusions today of PRBC and plts.   + loose BM - checking c diff.   
  
I have reviewed and agree with the care plan.     
  
  
 
  
 Chaparro Harper MD 
[facility] Hematology [facility] Oncology [id number] [address] Office : [phone] Fax : [phone]

## 2019-09-25 NOTE — PROGRESS NOTES
Problem: Falls - Risk of 
Goal: *Absence of Falls Description Document Mannie Saurav Fall Risk and appropriate interventions in the flowsheet. Outcome: Progressing Towards Goal 
Note:  
Fall Risk Interventions: 
Mobility Interventions: Communicate number of staff needed for ambulation/transfer, Patient to call before getting OOB Medication Interventions: Patient to call before getting OOB, Evaluate medications/consider consulting pharmacy Elimination Interventions: Patient to call for help with toileting needs, Call light in reach History of Falls Interventions: Room close to nurse's station

## 2019-09-25 NOTE — PROGRESS NOTES
Found in the BR standing bent on her knees;holding on to the siderail/bar of the toilet;urine on the floor. Assisted to the toilet chair. Patient stated she did not fall;her legs are weak & bladder is full she didn't make it on time. She was holding on to the rail so she won't fall when I entered her room. Instructed to call for assistance from now on;pt usually walks independently;along the hallways;but seem to be weaker now. Again;asked if she fell;pt stated \"No\". Assessed for any visible injuries;new bruises;none noted. Denies pain. Assisted back to bed after helping pt get cleaned up;1/2 an hour later had BM incontinence in bed;stated she coughed & didn't realize she had a BM too. Linens changed & helped her get cleaned up. 
 
0300 Assisted pt to the BR;noted new  2 round-like bruises on her back R side;pt w/ low platelet count;pt does not know how she got it. Again asked if she did fall last night when I saw her in the BR;stated no.

## 2019-09-25 NOTE — PROGRESS NOTES
END OF SHIFT NOTE: 
 
Intake/Output 
09/25 0701 - 09/25 1900 In: 1741.7 [P.O.:1340] Out: 1300 [Urine:1100] Voiding: YES Catheter: NO 
Drain:   
 
 
 
 
 
Stool:  5 occurrences. Stool Assessment Stool Color: Green;Brown (09/25/19 1705) Stool Appearance: Mucous; Loose (09/25/19 1705) Stool Amount: Small (09/25/19 1705) Stool Source/Status: Rectum (09/25/19 1705) Emesis:  0 occurrences. VITAL SIGNS Patient Vitals for the past 12 hrs: 
 Temp Pulse Resp BP SpO2  
09/25/19 1617 98.2 °F (36.8 °C) 73 18 136/78 98 %  
09/25/19 1428 98.2 °F (36.8 °C) 79 18 131/58 98 %  
09/25/19 1356 98.4 °F (36.9 °C) 84 17 129/69 97 % 09/25/19 1309 98.3 °F (36.8 °C) 94 18 116/63 97 % 09/25/19 1052 (!) 102.2 °F (39 °C)      
09/25/19 0940 (!) 101.7 °F (38.7 °C) 84 17 138/60 97 % 09/25/19 0910 100 °F (37.8 °C)      
09/25/19 0740 100.2 °F (37.9 °C) 72 18 138/60 96 % Pain Assessment Pain 1 Pain Scale 1: Numeric (0 - 10) (09/25/19 0813) Pain Intensity 1: 0 (09/25/19 0813) Patient Stated Pain Goal: 0 (09/25/19 0813) Pain Reassessment 1: Patient resting w/respiratory rate greater than 10 (09/24/19 1400) Pain Onset 1: 20 minutes (09/23/19 0334) Pain Location 1: Head (09/23/19 0334) Pain Orientation 1: Anterior (09/23/19 0334) Pain Description 1: Dull (09/23/19 0334) Pain Intervention(s) 1: Medication (see MAR) (09/23/19 0334) Ambulating Yes Additional Information: Pt received 1 unit of blood and 1 unit of platelets during shift. Pt tolerated well. TMAX 102. 2. Pt received one dose of Ofirmev. Stool sent for c.diff test r/t 5 loose watery stools during shift. No other needs at this time. Shift report given to oncoming nurse at the bedside. Ariane Wells

## 2019-09-26 LAB
ABO + RH BLD: NORMAL
ANION GAP SERPL CALC-SCNC: 8 MMOL/L (ref 7–16)
BACTERIA SPEC CULT: NORMAL
BACTERIA SPEC CULT: NORMAL
BASOPHILS # BLD: 0 K/UL (ref 0–0.2)
BASOPHILS NFR BLD: 0 % (ref 0–2)
BLD PROD TYP BPU: NORMAL
BLOOD GROUP ANTIBODIES SERPL: NORMAL
BPU ID: NORMAL
BUN SERPL-MCNC: 10 MG/DL (ref 8–23)
C DIFF GDH STL QL: NORMAL
C DIFF TOX A+B STL QL IA: NORMAL
CALCIUM SERPL-MCNC: 8 MG/DL (ref 8.3–10.4)
CHLORIDE SERPL-SCNC: 113 MMOL/L (ref 98–107)
CLINICAL CONSIDERATION: NORMAL
CO2 SERPL-SCNC: 22 MMOL/L (ref 21–32)
CREAT SERPL-MCNC: 0.76 MG/DL (ref 0.6–1)
CROSSMATCH RESULT,%XM: NORMAL
CROSSMATCH RESULT,%XM: NORMAL
DIFFERENTIAL METHOD BLD: ABNORMAL
EOSINOPHIL # BLD: 0 K/UL (ref 0–0.8)
EOSINOPHIL NFR BLD: 2 % (ref 0.5–7.8)
ERYTHROCYTE [DISTWIDTH] IN BLOOD BY AUTOMATED COUNT: 14.3 % (ref 11.9–14.6)
GLUCOSE SERPL-MCNC: 99 MG/DL (ref 65–100)
HCT VFR BLD AUTO: 23.6 % (ref 35.8–46.3)
HGB BLD-MCNC: 8.1 G/DL (ref 11.7–15.4)
IMM GRANULOCYTES # BLD AUTO: 0 K/UL (ref 0–0.5)
IMM GRANULOCYTES NFR BLD AUTO: 0 % (ref 0–5)
INTERPRETATION: NORMAL
LYMPHOCYTES # BLD: 0.5 K/UL (ref 0.5–4.6)
LYMPHOCYTES NFR BLD: 90 % (ref 13–44)
MCH RBC QN AUTO: 29.9 PG (ref 26.1–32.9)
MCHC RBC AUTO-ENTMCNC: 34.3 G/DL (ref 31.4–35)
MCV RBC AUTO: 87.1 FL (ref 79.6–97.8)
MONOCYTES # BLD: 0 K/UL (ref 0.1–1.3)
MONOCYTES NFR BLD: 6 % (ref 4–12)
NEUTS SEG # BLD: 0 K/UL (ref 1.7–8.2)
NEUTS SEG NFR BLD: 2 % (ref 43–78)
NRBC # BLD: 0 K/UL (ref 0–0.2)
PCR REFLEX: NORMAL
PLATELET # BLD AUTO: 42 K/UL (ref 150–450)
PLATELET COMMENTS,PCOM: ABNORMAL
PMV BLD AUTO: 11.4 FL (ref 9.4–12.3)
POTASSIUM SERPL-SCNC: 3.4 MMOL/L (ref 3.5–5.1)
RBC # BLD AUTO: 2.71 M/UL (ref 4.05–5.2)
RBC MORPH BLD: ABNORMAL
SERVICE CMNT-IMP: NORMAL
SERVICE CMNT-IMP: NORMAL
SODIUM SERPL-SCNC: 143 MMOL/L (ref 136–145)
SPECIMEN EXP DATE BLD: NORMAL
STATUS OF UNIT,%ST: NORMAL
UNIT DIVISION, %UDIV: 0
VANCOMYCIN TROUGH SERPL-MCNC: 15.9 UG/ML (ref 5–20)
WBC # BLD AUTO: 0.5 K/UL (ref 4.3–11.1)
WBC MORPH BLD: ABNORMAL

## 2019-09-26 PROCEDURE — 80202 ASSAY OF VANCOMYCIN: CPT

## 2019-09-26 PROCEDURE — 74011250637 HC RX REV CODE- 250/637: Performed by: NURSE PRACTITIONER

## 2019-09-26 PROCEDURE — 74011250637 HC RX REV CODE- 250/637: Performed by: INTERNAL MEDICINE

## 2019-09-26 PROCEDURE — 74011250636 HC RX REV CODE- 250/636: Performed by: NURSE PRACTITIONER

## 2019-09-26 PROCEDURE — 74011000258 HC RX REV CODE- 258: Performed by: INTERNAL MEDICINE

## 2019-09-26 PROCEDURE — 74011250636 HC RX REV CODE- 250/636: Performed by: INTERNAL MEDICINE

## 2019-09-26 PROCEDURE — 85025 COMPLETE CBC W/AUTO DIFF WBC: CPT

## 2019-09-26 PROCEDURE — 87040 BLOOD CULTURE FOR BACTERIA: CPT

## 2019-09-26 PROCEDURE — 80048 BASIC METABOLIC PNL TOTAL CA: CPT

## 2019-09-26 PROCEDURE — 99232 SBSQ HOSP IP/OBS MODERATE 35: CPT | Performed by: INTERNAL MEDICINE

## 2019-09-26 PROCEDURE — 36415 COLL VENOUS BLD VENIPUNCTURE: CPT

## 2019-09-26 PROCEDURE — 65270000029 HC RM PRIVATE

## 2019-09-26 RX ORDER — LOPERAMIDE HYDROCHLORIDE 2 MG/1
2 CAPSULE ORAL
Status: DISCONTINUED | OUTPATIENT
Start: 2019-09-26 | End: 2019-10-10 | Stop reason: HOSPADM

## 2019-09-26 RX ORDER — DIPHENOXYLATE HYDROCHLORIDE AND ATROPINE SULFATE 2.5; .025 MG/1; MG/1
2 TABLET ORAL
Status: DISCONTINUED | OUTPATIENT
Start: 2019-09-26 | End: 2019-10-10 | Stop reason: HOSPADM

## 2019-09-26 RX ADMIN — ACETAMINOPHEN 650 MG: 325 TABLET, FILM COATED ORAL at 17:10

## 2019-09-26 RX ADMIN — PIPERACILLIN SODIUM AND TAZOBACTAM SODIUM 4.5 G: 4; .5 INJECTION, POWDER, LYOPHILIZED, FOR SOLUTION INTRAVENOUS at 11:46

## 2019-09-26 RX ADMIN — DIPHENOXYLATE HYDROCHLORIDE AND ATROPINE SULFATE 2 TABLET: 2.5; .025 TABLET ORAL at 13:07

## 2019-09-26 RX ADMIN — DULOXETINE 30 MG: 30 CAPSULE, DELAYED RELEASE ORAL at 07:46

## 2019-09-26 RX ADMIN — SODIUM CHLORIDE 50 ML/HR: 900 INJECTION, SOLUTION INTRAVENOUS at 17:01

## 2019-09-26 RX ADMIN — DIPHENHYDRAMINE HYDROCHLORIDE 50 MG: 25 CAPSULE ORAL at 21:49

## 2019-09-26 RX ADMIN — LORAZEPAM 1 MG: 1 TABLET ORAL at 21:49

## 2019-09-26 RX ADMIN — VORICONAZOLE 200 MG: 200 TABLET, FILM COATED ORAL at 07:46

## 2019-09-26 RX ADMIN — MEROPENEM 500 MG: 500 INJECTION, POWDER, FOR SOLUTION INTRAVENOUS at 16:58

## 2019-09-26 RX ADMIN — PRAVASTATIN SODIUM 10 MG: 20 TABLET ORAL at 21:50

## 2019-09-26 RX ADMIN — VORICONAZOLE 200 MG: 200 TABLET, FILM COATED ORAL at 21:50

## 2019-09-26 RX ADMIN — VANCOMYCIN HYDROCHLORIDE 1250 MG: 10 INJECTION, POWDER, LYOPHILIZED, FOR SOLUTION INTRAVENOUS at 04:55

## 2019-09-26 RX ADMIN — ACYCLOVIR 400 MG: 800 TABLET ORAL at 07:46

## 2019-09-26 RX ADMIN — MEROPENEM 500 MG: 500 INJECTION, POWDER, FOR SOLUTION INTRAVENOUS at 21:50

## 2019-09-26 RX ADMIN — POTASSIUM CHLORIDE 20 MEQ: 20 TABLET, EXTENDED RELEASE ORAL at 16:58

## 2019-09-26 RX ADMIN — PIPERACILLIN SODIUM AND TAZOBACTAM SODIUM 4.5 G: 4; .5 INJECTION, POWDER, LYOPHILIZED, FOR SOLUTION INTRAVENOUS at 03:39

## 2019-09-26 RX ADMIN — ACETAMINOPHEN 650 MG: 325 TABLET, FILM COATED ORAL at 07:46

## 2019-09-26 RX ADMIN — POTASSIUM CHLORIDE 20 MEQ: 20 TABLET, EXTENDED RELEASE ORAL at 07:46

## 2019-09-26 RX ADMIN — ACYCLOVIR 400 MG: 800 TABLET ORAL at 16:58

## 2019-09-26 NOTE — PROGRESS NOTES
END OF SHIFT NOTE: 
 
Intake/Output 
09/25 1901 - 09/26 0700 In: 2192 [I.V.:2192] Out: 200 [Urine:200] Voiding: YES Catheter: NO 
Drain:   
 
 
 
 
 
Stool:  4 occurrences. Stool Assessment Stool Color: Green;Brown (09/25/19 2001) Stool Appearance: Mucous; Loose (09/25/19 2001) Stool Amount: Small (09/25/19 2001) Stool Source/Status: Rectum (09/25/19 2001) Emesis:  0 occurrences. VITAL SIGNS Patient Vitals for the past 12 hrs: 
 Temp Pulse Resp BP SpO2  
09/26/19 0339 98 °F (36.7 °C) 100 18 147/66 95 % 09/26/19 0021 (!) 101 °F (38.3 °C) 100 20 144/75 93 % 09/25/19 2139 (!) 101.5 °F (38.6 °C)      
09/25/19 1959 (!) 102.6 °F (39.2 °C) 99 22 151/77 96 % Pain Assessment Pain 1 Pain Scale 1: Numeric (0 - 10) (09/25/19 2000) Pain Intensity 1: 0 (09/25/19 2000) Patient Stated Pain Goal: 0 (09/25/19 2000) Pain Reassessment 1: Patient resting w/respiratory rate greater than 10 (09/24/19 1400) Pain Onset 1: 20 minutes (09/23/19 0334) Pain Location 1: Head (09/23/19 0334) Pain Orientation 1: Anterior (09/23/19 0334) Pain Description 1: Dull (09/23/19 0334) Pain Intervention(s) 1: Medication (see MAR) (09/23/19 0334) Ambulating Yes Additional Information: Patient rested well. Loose stools throughout the night. Tmax 102.6 during night. Given PO tylenol and slowly came down. VSS. No needs voiced. Shift report given to oncoming nurse at the bedside. Claudia Trinidad

## 2019-09-26 NOTE — PROGRESS NOTES
SW reviewed patient's chart on this date. Patient not yet stable for discharge. Awaiting count recovery. Plans to check for C-Diff. No PT/OT consults during this admission, thus far.  SW will continue to monitor this case during current admission for possible needs, and will remain available to patient and medical team.

## 2019-09-26 NOTE — PROGRESS NOTES
Blood cultures obtained r/t fever of 101. Aseptic protocol used, end cap of Line changed. Pt tolerated well.

## 2019-09-26 NOTE — PROGRESS NOTES
Pharmacokinetic Consult to Pharmacist 
 
Naty Alexander is a 68 y.o. female being treated for febrile neutropenia with vancomycin and pip/tazo. Height: 5' 6\" (167.6 cm)  Weight: 90.5 kg (199 lb 9.6 oz) Lab Results Component Value Date/Time BUN 10 09/26/2019 03:48 AM  
 Creatinine 0.76 09/26/2019 03:48 AM  
 WBC 0.5 (LL) 09/26/2019 03:48 AM  
 Procalcitonin 0.1 09/21/2019 06:50 PM  
 Lactic Acid (POC) 0.67 09/21/2019 08:51 PM  
  
Estimated Creatinine Clearance: 74.7 mL/min (based on SCr of 0.76 mg/dL). Lab Results Component Value Date/Time Vancomycin,trough 15.9 09/26/2019 03:48 AM  
 
 
Day 6 of vancomycin. Tr = 15.9. No changes, continue 1.25g q12h Pharmacy will continue to follow. Please call with any questions. Thank you, Geoffrey Horne, Pharm. D. Clinical Pharmacist 
[phone]

## 2019-09-26 NOTE — PROGRESS NOTES
Problem: Falls - Risk of 
Goal: *Absence of Falls Description Document Palaktee Bill Fall Risk and appropriate interventions in the flowsheet. Outcome: Progressing Towards Goal 
Note:  
Fall Risk Interventions: 
Mobility Interventions: Communicate number of staff needed for ambulation/transfer, Patient to call before getting OOB Medication Interventions: Patient to call before getting OOB Elimination Interventions: Call light in reach, Patient to call for help with toileting needs History of Falls Interventions: Room close to nurse's station

## 2019-09-26 NOTE — PROGRESS NOTES
Problem: Falls - Risk of 
Goal: *Absence of Falls Description Document Collins Fitzgerald Fall Risk and appropriate interventions in the flowsheet. 9/26/2019 1506 by Woodrow Alvarado Outcome: Progressing Towards Goal 
Note:  
Fall Risk Interventions: 
Mobility Interventions: Communicate number of staff needed for ambulation/transfer, Patient to call before getting OOB Medication Interventions: Patient to call before getting OOB Elimination Interventions: Call light in reach, Patient to call for help with toileting needs History of Falls Interventions: Room close to nurse's station 9/26/2019 1023 by Woodrow Alvarado Outcome: Progressing Towards Goal 
Note:  
Fall Risk Interventions: 
Mobility Interventions: Communicate number of staff needed for ambulation/transfer, Patient to call before getting OOB Medication Interventions: Teach patient to arise slowly, Patient to call before getting OOB Elimination Interventions: Call light in reach, Patient to call for help with toileting needs History of Falls Interventions: Room close to nurse's station Problem: Patient Education: Go to Patient Education Activity Goal: Patient/Family Education 9/26/2019 1506 by Woodrow Alvarado Outcome: Progressing Towards Goal 
9/26/2019 1023 by Woodrow Alvarado Outcome: Progressing Towards Goal 
  
Problem: Body Temperature -  Risk of, Imbalanced Goal: *Absence of heat stress or hyperthermia signs and symptoms 9/26/2019 1506 by Woodrow Alvarado Outcome: Progressing Towards Goal 
9/26/2019 1023 by Woodrow Alvarado Outcome: Progressing Towards Goal 
  
Problem: Patient Education: Go to Patient Education Activity Goal: Patient/Family Education 9/26/2019 1506 by Woodrow Alvarado Outcome: Progressing Towards Goal 
9/26/2019 1023 by Woodrow Alvarado Outcome: Progressing Towards Goal 
  
Problem: Anemia Care Plan (Adult and Pediatric) Goal: *Labs within defined limits 9/26/2019 1506 by India Martins Outcome: Progressing Towards Goal 
9/26/2019 1023 by India Martins Outcome: Progressing Towards Goal 
Goal: *Tolerates increased activity 9/26/2019 1506 by India Martins Outcome: Progressing Towards Goal 
9/26/2019 1023 by India Martins Outcome: Progressing Towards Goal 
  
Problem: Patient Education: Go to Patient Education Activity Goal: Patient/Family Education 9/26/2019 1506 by Indai Martins Outcome: Progressing Towards Goal 
9/26/2019 1023 by India Martins Outcome: Progressing Towards Goal 
  
Problem: Risk for Spread of Infection Goal: Prevent transmission of infectious organism to others Description Prevent the transmission of infectious organisms to other patients, staff members, and visitors. 9/26/2019 1506 by India Martins Outcome: Progressing Towards Goal 
9/26/2019 1023 by India Martins Outcome: Progressing Towards Goal 
  
Problem: Patient Education:  Go to Education Activity Goal: Patient/Family Education 9/26/2019 1506 by India Martins Outcome: Progressing Towards Goal 
9/26/2019 1023 by India Martins Outcome: Progressing Towards Goal

## 2019-09-26 NOTE — PROGRESS NOTES
END OF SHIFT NOTE: 
 
Intake/Output 
09/26 0701 - 09/26 1900 In: 1521.2 [P.O.:838; I.V.:683.2] Out: 200 [Urine:200] Voiding: YES Catheter: NO 
Drain:   
 
 
 
 
 
Stool:  4 occurrences. Stool Assessment Stool Color: Green;Brown (09/25/19 2001) Stool Appearance: Mucous; Loose (09/26/19 0746) Stool Amount: Small (09/25/19 2001) Stool Source/Status: Rectum (09/25/19 2001) Emesis:  0 occurrences. VITAL SIGNS Patient Vitals for the past 12 hrs: 
 Temp Pulse Resp BP SpO2  
09/26/19 1709 (!) 101 °F (38.3 °C)      
09/26/19 1509 98.6 °F (37 °C) 80 18 124/62 98 %  
09/26/19 1147 98.2 °F (36.8 °C) 75 18 140/69 97 % 09/26/19 0716 (!) 101 °F (38.3 °C) 82 19 140/70 96 % Pain Assessment Pain 1 Pain Scale 1: Numeric (0 - 10) (09/25/19 2000) Pain Intensity 1: 0 (09/25/19 2000) Patient Stated Pain Goal: 0 (09/25/19 2000) Pain Reassessment 1: Patient resting w/respiratory rate greater than 10 (09/24/19 1400) Pain Onset 1: 20 minutes (09/23/19 0334) Pain Location 1: Head (09/23/19 0334) Pain Orientation 1: Anterior (09/23/19 0334) Pain Description 1: Dull (09/23/19 0334) Pain Intervention(s) 1: Medication (see MAR) (09/23/19 0334) Ambulating Yes Additional Information: Pt ambulated hallways with . TMAX 101 this shift. Blood cultures of VAD and Peripheral obtained. Pt received one dose of imodium this shift. C.Diff negative. No other needs at this time. Shift report given to oncoming nurse at the bedside. Polly Lynch

## 2019-09-26 NOTE — PROGRESS NOTES
[facility] Hematology & Oncology Inpatient Hematology / Oncology Progress Note Admission Date: 2019  6:30 PM 
Reason for Admission/Hospital Course: Febrile neutropenia ([address] [D70.9, R50.81] 24 Hour Events: 
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 
Day 5 zosyn/vancomyin - stop vancomycin ROS: 
Constitutional: negative for fever, chills, weakness, malaise, fatigue. CV:  negative for chest pain, palpitations, edema. Respiratory:  negative for dyspnea, cough, wheezing. GI: + diarrhea. negative for nausea, abdominal pain. 10 point review of systems is otherwise negative with the exception of the elements mentioned above in the HPI. No Known Allergies OBJECTIVE: 
Patient Vitals for the past 8 hrs: 
 BP Temp Pulse Resp SpO2  
19 1147 140/69 98.2 °F (36.8 °C) 75 18 97 % 19 0716 140/70 (!) 101 °F (38.3 °C) 82 19 96 % Temp (24hrs), Av.8 °F (37.7 °C), Min:98 °F (36.7 °C), Max:102.6 °F (39.2 °C) 
 
 0701 -  1900 In: 1129.2 [P.O.:838; I.V.:291.2] Out: - Physical Exam: 
Constitutional: Well developed, well nourished female in no acute distress, sitting comfortably in the hospital bed. HEENT: Normocephalic and atraumatic. Oropharynx is clear, mucous membranes are moist.  Pupils are equal, round, and reactive to light. Extraocular muscles are intact. Sclerae anicteric. Skin Warm and dry. No bruising and no rash noted. No erythema. + pallor Respiratory Lungs are clear to auscultation bilaterally without wheezes, rales or rhonchi, normal air exchange without accessory muscle use. CVS Normal rate, regular rhythm and normal S1 and S2. No murmurs, gallops, or rubs. Abdomen Soft, nontender and nondistended, normoactive bowel sounds.   No palpable mass. No hepatosplenomegaly. Neuro Grossly nonfocal with no obvious sensory or motor deficits. MSK Normal range of motion in general.  No edema and no tenderness. Psych Appropriate mood and affect. Labs: 
   
Recent Labs  
  09/26/19 0348 09/25/19 0239 09/24/19 
0401 WBC 0.5* 0.4* 0.5* RBC 2.71* 2.31* 2.45* HGB 8.1* 6.9* 7.4* HCT 23.6* 20.5* 21.5* MCV 87.1 88.7 87.8 MCH 29.9 29.9 30.2 MCHC 34.3 33.7 34.4  
RDW 14.3 14.6 14.6 PLT 42* 5* 12* GRANS 2*  --   --   
LYMPH 90*  --   -- MONOS 6  --   --   
EOS 2  --   --   
BASOS 0  --   --   
IG 0  --   --   
DF AUTOMATED MANUAL MANUAL ANEU 0.0*  --   --   
ABL 0.5  --   --   
ABM 0.0*  --   --   
ALEKSANDR 0.0  --   --   
ABB 0.0  --   --   
AIG 0.0  --   --   
 
  
Recent Labs  
  09/26/19 0348 09/25/19 
0239 09/24/19 
0401  144 142  
K 3.4* 3.7 3.5 * 114* 113* CO2 22 24 23 AGAP 8 6* 6*  
GLU 99 104* 90 BUN 10 9 10 CREA 0.76 0.75 0.71 GFRAA >60 >60 >60 GFRNA >60 >60 >60  
CA 8.0* 7.7* 7.7* Imaging: XR CHEST PA LAT [[id number]] Collected: 09/24/19 1617 Order Status: Completed Updated: 09/24/19 1620 Narrative:    
CHEST X-RAY, 2 views 9/24/2019 History: Tachypnea and fever. Technique: PA and lateral views of the chest.  
 
Comparison: Chest x-ray 9/21/2019 Findings: A stable left-sided venous port is seen. The cardiac silhouette is mildly 
enlarged although stable.  The lungs are expanded without evidence for 
pneumothorax.  No evolving consolidation, or evidence of pleural effusion is 
seen. The bony thorax demonstrates no acute changes.  The upper abdomen is 
unremarkable in appearance. Impression:    
IMPRESSION:  
1.  Stable cardiomegaly without evolving acute changes evident by plain film 
imaging. XR CHEST PA LAT [[id number]] Collected: 09/21/19 2042 Order Status: Completed Updated: 09/21/19 2047 Narrative:    
EXAM: Chest x-ray. INDICATION: Febrile neutropenia. COMPARISON: May 18, 2019. TECHNIQUE: Frontal and lateral x-rays of the chest were obtained. FINDINGS: The lungs are clear except for minimal linear atelectasis or scarring 
in the lateral left costophrenic angle. The cardiac size, mediastinal contour 
and pulmonary vasculature are within normal limits. No pneumothorax or pleural 
effusion is seen. A left chest wall infusion port catheter remains in place. Impression:    
IMPRESSION: No acute process. Medications: 
Current Facility-Administered Medications Medication Dose Route Frequency  loperamide (IMODIUM) capsule 2 mg  2 mg Oral Q4H PRN  
 diphenoxylate-atropine (LOMOTIL) tablet 2 Tab  2 Tab Oral QID PRN  
 0.9% sodium chloride infusion 250 mL  250 mL IntraVENous PRN  
 voriconazole (VFEND) tablet 200 mg  200 mg Oral Q12H  piperacillin-tazobactam (ZOSYN) 4.5 g in 0.9% sodium chloride (MBP/ADV) 100 mL  4.5 g IntraVENous Q8H  potassium chloride (K-DUR, KLOR-CON) SR tablet 20 mEq  20 mEq Oral BID  polyethylene glycol (MIRALAX) packet 17 g  17 g Oral DAILY PRN  
 acetaminophen (TYLENOL) tablet 650 mg  650 mg Oral Q6H PRN  
 gilteritinib tab (Xospata) 120 mg = 3 tabs  (Patient Supplied)  120 mg Oral PCL  sodium chloride (NS) flush 5-10 mL  5-10 mL IntraVENous PRN  
 0.9% sodium chloride infusion  50 mL/hr IntraVENous CONTINUOUS  
 DULoxetine (CYMBALTA) capsule 30 mg  30 mg Oral DAILY  LORazepam (ATIVAN) tablet 1 mg  1 mg Oral QHS  pravastatin (PRAVACHOL) tablet 10 mg  10 mg Oral QHS  zolpidem (AMBIEN) tablet 5 mg  5 mg Oral QHS PRN  
 acyclovir (ZOVIRAX) tablet 400 mg  400 mg Oral BID  influenza vaccine 2019-20 (6 mos+)(PF) (FLUARIX/FLULAVAL/FLUZONE QUAD) injection 0.5 mL  0.5 mL IntraMUSCular PRIOR TO DISCHARGE  diphenhydrAMINE (BENADRYL) capsule 50 mg  50 mg Oral QHS PRN  
 
 
 
ASSESSMENT: 
 
Problem List  Date Reviewed: 9/19/2019 Codes Class Noted * (Principal) Febrile neutropenia (HCC) ICD-10-CM: D70.9, R50.81 ICD-9-CM: 288.00, 780.61  9/21/2019 Pancytopenia due to antineoplastic chemotherapy [facility]) ICD-10-CM: D61.810, T45.1X5A 
ICD-9-CM: 284.11, E933.1  6/12/2019 Cellulitis of neck ICD-10-CM: M95.961 ICD-9-CM: 682.1  6/12/2019 Immunocompromised status associated with infection ([facility] ICD-10-CM: D84.9, B99.9 ICD-9-CM: 279.3, 136.9  6/12/2019 Port or reservoir infection ICD-10-CM: Z32.410U ICD-9-CM: 999.33  6/12/2019 Acute myeloid leukemia not having achieved remission ([facility] ICD-10-CM: C92.00 ICD-9-CM: 205.00  5/9/2019 Admission for antineoplastic chemotherapy ICD-10-CM: Z51.11 ICD-9-CM: V58.11  5/5/2019 AML (acute myeloblastic leukemia) ([facility] ICD-10-CM: C92.00 ICD-9-CM: 205.00  4/28/2019 Weakness generalized ICD-10-CM: R53.1 ICD-9-CM: 780.79  4/28/2019 Pancytopenia ([facility] ICD-10-CM: K22.243 ICD-9-CM: 284.19  4/28/2019 Thrombocytopenia ([facility] ICD-10-CM: D69.6 ICD-9-CM: 287.5  4/27/2019 Ms. Aramis Graf is a 68year old female who was admitted on 9/21/2019 for neutropenic fever. She has been having intermittent low grade fevers over the last week. She came to infusion yesterday for a blood transfusion, and after going home she had a fever of 102.5 and was sent to the ER for evaluation. She is a known patient of Dr. Adebayo Neff with AML. Initially treated with induction 7+3 and Midostaurin. Most recently on Decitabine plus Gilteritinib with cycle 2 beginning on 8/26/2019 and cycle 3 due 9/23/2019. CXR negative. UA clear. BC/UC NTD. Started on zosyn and vanc. PLAN: 
AML 
-On Dacogen/Gilteritinib with most recent cycle 2 on 8/26/2019 with cycle 3 due 9/23/2019 9/23 Discuss BMbx flow results with patient-persistent AML-58% blasts. Still waiting on final pathology.   
9/24 Bone marrow biopsy final path still pending.  
  
Neutropenic Fever 
-UA clear, BC/UC NTD 
 -On Zosyn/Vanc empirically 9/23 Tmax 100.8. Feeling better today. Day 2 zosyn and vanc. BC/UC NTD 
9/24 Tmax 100.6. Day 3 vanc/zosyn. BC/UC still NGTD.  
9/25 Check aspergillus, fungitell, CMV. Add antifungal - vfend. CXR and BCx repeated yesterday and negative 9/26 BCx remain negative. Repeated this morning. Cdiff negative. D/c vancomycin. Continue zosyn, acyclovir, voriconazole. Consult ID for additional recommendations. Of note, gilteritinib can cause fevers in 13-35% of patients. Unclear timeline for febrile episodes? Will try to contact rep to discuss Diarrhea 9/26 Cdiff negative. Add imodium/lomotil PRN Pancytopenia related to chemo 9/23 hgb 6.5 transfuse per [facility] SOPs 
9/24 Hgb up to 7.4.  
9/25 Plt today Goals and plan of care reviewed with the patient. All questions answered to the best of our ability. Lizette Momin NP [facility] Hematology and Oncology [address] [place] [address] Office : [phone] Fax : [phone] Attending Addendum: 
Patient seen with NP. Mirela Hunter is a 79yo woman admitted on 9/21 with neutropenic fever.  Pt of Dr Zeenat Oglesby with known AML.  S/p induction 7+3/Midostaurin and most recently on Decitabine and Gilteritinib.  C3 was due 9/23/19, and C2 started on 8/26/19.  She presented to [facility] infusion for blood transfusion and upon arriving home developed fevers of 102.5 and presented to ED for further evaluation.  She was started on broad spectrum antibiotics.  Infx workup neg thus far.  checking aperg, fungitell, CMV. CXR and bld cultures negative thus far. ?Gilteritinib as cause of fevers if all else negative? No prior fevers on the drug. VSS.  Labs reviewed, transfuse per protocol.  BMbx with flow blasts at 58%.  BMBx with leukemia, FLT3-ITD.   D/w Dr Zeenat Oglesby - primary oncologist, will stay the course.  I personally performed a face to face diagnostic evaluation on this patient.  My findings are as follows: A&ox3, feels ok this am, lungs clear, heart regular, abdomen benign and no LE edema.  C/w supportive care.  + loose BM: neg C diff. Will get ID on board.     
  
I have reviewed and agree with the care plan.     
  
  
 
  
Michelle Rodriguez MD 
[facility] Hematology and Oncology [address] [place] [address] Office : [phone] Fax : [phone]

## 2019-09-26 NOTE — PROGRESS NOTES
Problem: Falls - Risk of 
Goal: *Absence of Falls Description Document Aleksandar Nevarez Fall Risk and appropriate interventions in the flowsheet. Outcome: Progressing Towards Goal 
Note:  
Fall Risk Interventions: 
Mobility Interventions: Communicate number of staff needed for ambulation/transfer, Patient to call before getting OOB Medication Interventions: Teach patient to arise slowly, Patient to call before getting OOB Elimination Interventions: Call light in reach, Patient to call for help with toileting needs History of Falls Interventions: Room close to nurse's station Problem: Patient Education: Go to Patient Education Activity Goal: Patient/Family Education Outcome: Progressing Towards Goal 
  
Problem: Body Temperature -  Risk of, Imbalanced Goal: *Absence of heat stress or hyperthermia signs and symptoms Outcome: Progressing Towards Goal 
  
Problem: Patient Education: Go to Patient Education Activity Goal: Patient/Family Education Outcome: Progressing Towards Goal 
  
Problem: Anemia Care Plan (Adult and Pediatric) Goal: *Labs within defined limits Outcome: Progressing Towards Goal 
Goal: *Tolerates increased activity Outcome: Progressing Towards Goal 
  
Problem: Patient Education: Go to Patient Education Activity Goal: Patient/Family Education Outcome: Progressing Towards Goal 
  
Problem: Risk for Spread of Infection Goal: Prevent transmission of infectious organism to others Description Prevent the transmission of infectious organisms to other patients, staff members, and visitors. Outcome: Progressing Towards Goal 
  
Problem: Patient Education:  Go to Education Activity Goal: Patient/Family Education Outcome: Progressing Towards Goal

## 2019-09-27 ENCOUNTER — APPOINTMENT (OUTPATIENT)
Dept: CT IMAGING | Age: 74
DRG: 809 | End: 2019-09-27
Attending: NURSE PRACTITIONER
Payer: MEDICARE

## 2019-09-27 LAB
ALBUMIN SERPL-MCNC: 2.4 G/DL (ref 3.2–4.6)
ALBUMIN/GLOB SERPL: 0.7 {RATIO} (ref 1.2–3.5)
ALP SERPL-CCNC: 71 U/L (ref 50–136)
ALT SERPL-CCNC: 31 U/L (ref 12–65)
ANION GAP SERPL CALC-SCNC: 7 MMOL/L (ref 7–16)
AST SERPL-CCNC: 35 U/L (ref 15–37)
BASOPHILS # BLD: 0 K/UL (ref 0–0.2)
BASOPHILS NFR BLD: 0 % (ref 0–2)
BILIRUB SERPL-MCNC: 0.4 MG/DL (ref 0.2–1.1)
BNP SERPL-MCNC: 458 PG/ML
BUN SERPL-MCNC: 6 MG/DL (ref 8–23)
CALCIUM SERPL-MCNC: 7.8 MG/DL (ref 8.3–10.4)
CHLORIDE SERPL-SCNC: 113 MMOL/L (ref 98–107)
CO2 SERPL-SCNC: 24 MMOL/L (ref 21–32)
CREAT SERPL-MCNC: 0.75 MG/DL (ref 0.6–1)
DIFFERENTIAL METHOD BLD: ABNORMAL
EOSINOPHIL # BLD: 0 K/UL (ref 0–0.8)
EOSINOPHIL NFR BLD: 2 % (ref 0.5–7.8)
ERYTHROCYTE [DISTWIDTH] IN BLOOD BY AUTOMATED COUNT: 14.4 % (ref 11.9–14.6)
GALACTOMANNAN AG SPEC IA-ACNC: 0.02 INDEX (ref 0–0.49)
GLOBULIN SER CALC-MCNC: 3.6 G/DL (ref 2.3–3.5)
GLUCOSE SERPL-MCNC: 98 MG/DL (ref 65–100)
HCT VFR BLD AUTO: 21.8 % (ref 35.8–46.3)
HGB BLD-MCNC: 7.3 G/DL (ref 11.7–15.4)
IMM GRANULOCYTES # BLD AUTO: 0 K/UL (ref 0–0.5)
IMM GRANULOCYTES NFR BLD AUTO: 2 % (ref 0–5)
LYMPHOCYTES # BLD: 0.5 K/UL (ref 0.5–4.6)
LYMPHOCYTES NFR BLD: 86 % (ref 13–44)
MCH RBC QN AUTO: 29.6 PG (ref 26.1–32.9)
MCHC RBC AUTO-ENTMCNC: 33.5 G/DL (ref 31.4–35)
MCV RBC AUTO: 88.3 FL (ref 79.6–97.8)
MONOCYTES # BLD: 0 K/UL (ref 0.1–1.3)
MONOCYTES NFR BLD: 6 % (ref 4–12)
NEUTS SEG # BLD: 0 K/UL (ref 1.7–8.2)
NEUTS SEG NFR BLD: 4 % (ref 43–78)
NRBC # BLD: 0 K/UL (ref 0–0.2)
PLATELET # BLD AUTO: 22 K/UL (ref 150–450)
PLATELET COMMENTS,PCOM: ABNORMAL
PMV BLD AUTO: 10.9 FL (ref 9.4–12.3)
POTASSIUM SERPL-SCNC: 3.8 MMOL/L (ref 3.5–5.1)
PROT SERPL-MCNC: 6 G/DL (ref 6.3–8.2)
RBC # BLD AUTO: 2.47 M/UL (ref 4.05–5.2)
RBC MORPH BLD: ABNORMAL
SODIUM SERPL-SCNC: 144 MMOL/L (ref 136–145)
WBC # BLD AUTO: 0.5 K/UL (ref 4.3–11.1)
WBC MORPH BLD: ABNORMAL

## 2019-09-27 PROCEDURE — 74011250636 HC RX REV CODE- 250/636: Performed by: NURSE PRACTITIONER

## 2019-09-27 PROCEDURE — 74011250637 HC RX REV CODE- 250/637: Performed by: NURSE PRACTITIONER

## 2019-09-27 PROCEDURE — 85025 COMPLETE CBC W/AUTO DIFF WBC: CPT

## 2019-09-27 PROCEDURE — 74011636320 HC RX REV CODE- 636/320: Performed by: INTERNAL MEDICINE

## 2019-09-27 PROCEDURE — 93306 TTE W/DOPPLER COMPLETE: CPT

## 2019-09-27 PROCEDURE — 99232 SBSQ HOSP IP/OBS MODERATE 35: CPT | Performed by: INTERNAL MEDICINE

## 2019-09-27 PROCEDURE — 36591 DRAW BLOOD OFF VENOUS DEVICE: CPT

## 2019-09-27 PROCEDURE — 74011000258 HC RX REV CODE- 258: Performed by: INTERNAL MEDICINE

## 2019-09-27 PROCEDURE — 74011250637 HC RX REV CODE- 250/637: Performed by: INTERNAL MEDICINE

## 2019-09-27 PROCEDURE — 80053 COMPREHEN METABOLIC PANEL: CPT

## 2019-09-27 PROCEDURE — 74011250636 HC RX REV CODE- 250/636: Performed by: INTERNAL MEDICINE

## 2019-09-27 PROCEDURE — 71260 CT THORAX DX C+: CPT

## 2019-09-27 PROCEDURE — 83880 ASSAY OF NATRIURETIC PEPTIDE: CPT

## 2019-09-27 PROCEDURE — 65270000029 HC RM PRIVATE

## 2019-09-27 RX ORDER — FUROSEMIDE 10 MG/ML
20 INJECTION INTRAMUSCULAR; INTRAVENOUS ONCE
Status: COMPLETED | OUTPATIENT
Start: 2019-09-27 | End: 2019-09-27

## 2019-09-27 RX ORDER — SODIUM CHLORIDE 0.9 % (FLUSH) 0.9 %
10 SYRINGE (ML) INJECTION
Status: COMPLETED | OUTPATIENT
Start: 2019-09-27 | End: 2019-09-27

## 2019-09-27 RX ADMIN — ACYCLOVIR 400 MG: 800 TABLET ORAL at 16:54

## 2019-09-27 RX ADMIN — PRAVASTATIN SODIUM 10 MG: 20 TABLET ORAL at 21:00

## 2019-09-27 RX ADMIN — VORICONAZOLE 200 MG: 200 TABLET, FILM COATED ORAL at 08:56

## 2019-09-27 RX ADMIN — ACYCLOVIR 400 MG: 800 TABLET ORAL at 08:57

## 2019-09-27 RX ADMIN — FUROSEMIDE 20 MG: 10 INJECTION, SOLUTION INTRAMUSCULAR; INTRAVENOUS at 16:54

## 2019-09-27 RX ADMIN — ACETAMINOPHEN 650 MG: 325 TABLET, FILM COATED ORAL at 19:46

## 2019-09-27 RX ADMIN — DIATRIZOATE MEGLUMINE AND DIATRIZOATE SODIUM 15 ML: 660; 100 LIQUID ORAL; RECTAL at 10:57

## 2019-09-27 RX ADMIN — VORICONAZOLE 200 MG: 200 TABLET, FILM COATED ORAL at 21:00

## 2019-09-27 RX ADMIN — SODIUM CHLORIDE 100 ML: 900 INJECTION, SOLUTION INTRAVENOUS at 16:19

## 2019-09-27 RX ADMIN — DIPHENHYDRAMINE HYDROCHLORIDE 50 MG: 25 CAPSULE ORAL at 21:46

## 2019-09-27 RX ADMIN — MEROPENEM 500 MG: 500 INJECTION, POWDER, FOR SOLUTION INTRAVENOUS at 03:38

## 2019-09-27 RX ADMIN — MEROPENEM 500 MG: 500 INJECTION, POWDER, FOR SOLUTION INTRAVENOUS at 16:54

## 2019-09-27 RX ADMIN — MEROPENEM 500 MG: 500 INJECTION, POWDER, FOR SOLUTION INTRAVENOUS at 10:57

## 2019-09-27 RX ADMIN — LOPERAMIDE HYDROCHLORIDE 2 MG: 2 CAPSULE ORAL at 13:01

## 2019-09-27 RX ADMIN — POTASSIUM CHLORIDE 20 MEQ: 20 TABLET, EXTENDED RELEASE ORAL at 16:53

## 2019-09-27 RX ADMIN — MEROPENEM 500 MG: 500 INJECTION, POWDER, FOR SOLUTION INTRAVENOUS at 21:00

## 2019-09-27 RX ADMIN — IOPAMIDOL 100 ML: 755 INJECTION, SOLUTION INTRAVENOUS at 16:19

## 2019-09-27 RX ADMIN — ACETAMINOPHEN 650 MG: 325 TABLET, FILM COATED ORAL at 03:48

## 2019-09-27 RX ADMIN — DULOXETINE 30 MG: 30 CAPSULE, DELAYED RELEASE ORAL at 08:56

## 2019-09-27 RX ADMIN — LORAZEPAM 1 MG: 1 TABLET ORAL at 21:00

## 2019-09-27 RX ADMIN — Medication 10 ML: at 16:19

## 2019-09-27 RX ADMIN — POTASSIUM CHLORIDE 20 MEQ: 20 TABLET, EXTENDED RELEASE ORAL at 08:57

## 2019-09-27 NOTE — PROGRESS NOTES
Infectious Disease Consult Today's Date: 2019 Admit Date: 2019 Impression: · Neutropenic fever - no localzing symptoms, unclear etiology- drug reaction vs AML vs infection · AML- dx 2019 - thus far not responding to treatment 58% blasts Plan:  
Fevers continue Check CT C/A/P today Cont merrem, vori Clinically doing ok Anti-infectives: · Vanc - · Zosyn - · merrem  - current · Voriconazole - current · Acyclovir prophy Subjective:  
Would like to go home. Having a great day otherwise. No complaints No Known Allergies Review of Systems:  A comprehensive review of systems was negative except for that written in the History of Present Illness. Objective:  
 
Visit Vitals /58 (BP 1 Location: Left arm, BP Patient Position: Sitting) Pulse 85 Temp 97.9 °F (36.6 °C) Resp 18 Ht 5' 6\" (1.676 m) Wt 91.6 kg (202 lb) SpO2 98% Breastfeeding? No  
BMI 32.60 kg/m² Temp (24hrs), Av.5 °F (37.5 °C), Min:97.9 °F (36.6 °C), Max:102.9 °F (39.4 °C) Lines:  L chest port c/d/i Physical Exam:   
General:  Alert, cooperative, well noursished, well developed, appears stated age Eyes:  Sclera anicteric. Pupils equally round and reactive to light. Mouth/Throat: Mucous membranes normal, oral pharynx clear Lungs:   Clear to auscultation bilaterally, good effort CV:  Regular rate and rhythm,no murmur, click, rub or gallop Abdomen:   Soft, non-tender. bowel sounds normal. non-distended Extremities: No cyanosis or edema Skin: Skin color, texture, turgor normal. no acute rash or lesions Lymph nodes: Cervical and supraclavicular normal  
Musculoskeletal: No swelling or deformity Lines/Devices:  Intact, no erythema, drainage or tenderness Psych: Alert and oriented, normal mood affect given the setting Data Review: CBC: 
Recent Labs  
  19 
0345 19 
0348 19 
0239 WBC 0.5* 0.5* 0.4*  
 GRANS 4* 2*  --   
MONOS 6 6  --   
EOS 2 2  --   
ANEU 0.0* 0.0*  -- ABL 0.5 0.5  --   
HGB 7.3* 8.1* 6.9*  
HCT 21.8* 23.6* 20.5* PLT 22* 42* 5* BMP: 
Recent Labs  
  09/27/19 
0345 09/26/19 
0348 09/25/19 
0239 CREA 0.75 0.76 0.75  
BUN 6* 10 9  143 144  
K 3.8 3.4* 3.7 * 113* 114* CO2 24 22 24 AGAP 7 8 6* GLU 98 99 104* LFTS: 
Recent Labs  
  09/27/19 0345 TBILI 0.4 ALT 31 SGOT 35 AP 71  
TP 6.0* ALB 2.4* Microbiology:  
 
All Micro Results Procedure Component Value Units Date/Time CULTURE, BLOOD [[id number]] Collected:  09/26/19 1050 Order Status:  Completed Specimen:  Blood Updated:  09/27/19 1107 Special Requests: --     
  LEFT 
FOREARM Culture result: NO GROWTH AFTER 23 HOURS     
 CULTURE, BLOOD [[id number]] Collected:  09/26/19 0753 Order Status:  Completed Specimen:  Blood Updated:  09/27/19 1107 Special Requests: SINGLE PORT Culture result: NO GROWTH 1 DAY     
 CULTURE, BLOOD [[id number]] Collected:  09/24/19 1530 Order Status:  Completed Specimen:  Blood Updated:  09/27/19 1107 Special Requests: --     
  LEFT 
HAND Culture result: NO GROWTH 3 DAYS     
 CULTURE, BLOOD [[id number]] Collected:  09/24/19 1536 Order Status:  Completed Specimen:  Blood Updated:  09/27/19 1107 Special Requests: LATERAL PORT Culture result: NO GROWTH 3 DAYS C. DIFFICILE AG & TOXIN A/B [[id number]] Collected:  09/25/19 1703 Order Status:  Completed Specimen:  Stool Updated:  09/26/19 1203 [address] ANTIGEN    
  C. DIFFICILE GDH ANTIGEN-NEGATIVE  
     
  C. difficile toxin C. DIFFICILE TOXIN-NEGATIVE  
     
  PCR Reflex NOT APPLICABLE INTERPRETATION    
  NEGATIVE FOR TOXIGENIC C. DIFFICILE Clinical Consideration NEGATIVE FOR TOXIGENIC C. DIFFICILE  
     
 CULTURE, BLOOD [[id number]] Collected:  09/21/19 1903 Order Status:  Completed Specimen:  Blood Updated:  09/26/19 6240 Special Requests: --     
  LEFT Antecubital 
  
  Culture result: NO GROWTH 5 DAYS     
 CULTURE, BLOOD [[id number]] Collected:  09/21/19 1850 Order Status:  Completed Specimen:  Blood Updated:  09/26/19 7726 Special Requests: --     
  LEFT 
PORT Culture result: NO GROWTH 5 DAYS     
 CMV BY PCR, QT [[id number]] Collected:  09/25/19 2016 Order Status:  Completed Specimen:  Blood Updated:  09/25/19 2024 CULTURE, URINE [[id number]] Collected:  09/21/19 2039 Order Status:  Completed Specimen:  Urine from Clean catch Updated:  09/24/19 0700 Special Requests: NO SPECIAL REQUESTS Culture result:    
  <10,000 COLONIES/mL MIXED SKIN REGGIE ISOLATED Imaging: CXR 9/24 - no PNA Signed By: Hazel Hernandez MD   
 September 27, 2019

## 2019-09-27 NOTE — PROGRESS NOTES
Nutrition LOS Note: day 5 Assessment DIET REGULAR Food,Nutrition, and Pertinent History: Pt is tearful upon my visit today, stating that she is very tired and longs to be home. She states that everyone here is so friendly but she wishes to be in the comfort of her home and she originally thought she would be in the hospital for 1 day vs a week.  at bedside and it appears that they ate lunch together. Pt states that she is eating fair - \"as good as I can. \"  She reports constipation at admission but states that she is having frequent stools now. H/O AML. Anthropometrics: Height: 5' 6\" (167.6 cm), Weight Source: Standing scale (comment), Weight: 91.6 kg (202 lb), Body mass index is 32.6 kg/m². BMI class of overweight for age. Last 3 Recorded Weights in this Encounter  
 09/24/19 1916 09/25/19 1959 09/26/19 2125 Weight: 90.6 kg (199 lb 12.8 oz) 90.5 kg (199 lb 9.6 oz) 91.6 kg (202 lb) Edema: 1+ pitting to BLEs Macronutrient Needs: (88.1 kg -  9/21 standing scale) · EER:  8383-0392 kcal /day (15-20 kcal/kg listed BW) · EPR:   grams protein/day (1-1.2 grams/kg listed BW) Intake/Comparative Standards: Per RD meal rounds: 50-75% of lunch. Average intake for past 5 day(s)/13 recorded meal(s): 90%. This potentially meets ~100% of kcal and ~% of protein needs Nutrition Diagnosis: No nutrition diagnosis at this time Intervention:  
Meals and snacks: Continue current diet. Discharge nutrition plan: Too soon to determine. Aster Connolly [address], [address] [place] [address], [address], [address]

## 2019-09-27 NOTE — PROGRESS NOTES
Problem: Falls - Risk of 
Goal: *Absence of Falls Description Document Abel Perez Fall Risk and appropriate interventions in the flowsheet. Outcome: Progressing Towards Goal 
Note:  
Fall Risk Interventions: 
Mobility Interventions: Communicate number of staff needed for ambulation/transfer, Patient to call before getting OOB Medication Interventions: Teach patient to arise slowly Elimination Interventions: Toileting schedule/hourly rounds, Patient to call for help with toileting needs, Toilet paper/wipes in reach History of Falls Interventions: Investigate reason for fall Problem: Patient Education: Go to Patient Education Activity Goal: Patient/Family Education Outcome: Progressing Towards Goal

## 2019-09-27 NOTE — PROGRESS NOTES
[facility] Hematology & Oncology Inpatient Hematology / Oncology Progress Note Admission Date: 2019  6:30 PM 
Reason for Admission/Hospital Course: Febrile neutropenia ([address] [D70.9, R50.81] 24 Hour Events: 
Fevers persist - tmax 102.9 Up in room and feeling better today Did need O2 briefly this morning BNP was elevated Voriconazole added  Diarrhea improved ID changed zosyn to merrem ROS: 
Constitutional: negative for fever, chills, weakness, malaise, fatigue. CV:  negative for chest pain, palpitations, edema. Respiratory:  negative for dyspnea, cough, wheezing. GI: + diarrhea - better. negative for nausea, abdominal pain. 10 point review of systems is otherwise negative with the exception of the elements mentioned above in the HPI. No Known Allergies OBJECTIVE: 
Patient Vitals for the past 8 hrs: 
 BP Temp Pulse Resp SpO2  
19 1118 148/68 99.4 °F (37.4 °C) 98 18 98 %  
19 0731 119/58 97.9 °F (36.6 °C) 85 18 98 %  
19 0534  98.3 °F (36.8 °C)    Temp (24hrs), Av.6 °F (37.6 °C), Min:97.9 °F (36.6 °C), Max:102.9 °F (39.4 °C) 
 
 0701 -  1900 In: 700 [P.O.:700] Out: 300 [Urine:300] Physical Exam: 
Constitutional: Well developed, well nourished female in no acute distress, sitting comfortably in the hospital bed. HEENT: Normocephalic and atraumatic. Oropharynx is clear, mucous membranes are moist.  Pupils are equal, round, and reactive to light. Extraocular muscles are intact. Sclerae anicteric. Skin Warm and dry. No bruising and no rash noted. No erythema. + pallor Respiratory Lungs are clear to auscultation bilaterally without wheezes, rales or rhonchi, normal air exchange without accessory muscle use. CVS Normal rate, regular rhythm and normal S1 and S2. No murmurs, gallops, or rubs. Abdomen Soft, nontender and nondistended, normoactive bowel sounds. No palpable mass. No hepatosplenomegaly. Neuro Grossly nonfocal with no obvious sensory or motor deficits. MSK Normal range of motion in general.  No edema and no tenderness. Psych Appropriate mood and affect. Labs: 
   
Recent Labs  
  09/27/19 0345 09/26/19 0348 09/25/19 0239 WBC 0.5* 0.5* 0.4* RBC 2.47* 2.71* 2.31* HGB 7.3* 8.1* 6.9*  
HCT 21.8* 23.6* 20.5* MCV 88.3 87.1 88.7 MCH 29.6 29.9 29.9 MCHC 33.5 34.3 33.7 RDW 14.4 14.3 14.6 PLT 22* 42* 5*  
GRANS 4* 2*  --   
LYMPH 86* 90*  --   
MONOS 6 6  --   
EOS 2 2  --   
BASOS 0 0  --   
IG 2 0  --   
DF AUTOMATED AUTOMATED MANUAL ANEU 0.0* 0.0*  -- ABL 0.5 0.5  --   
ABM 0.0* 0.0*  --   
ALEKSANDR 0.0 0.0  --   
ABB 0.0 0.0  --   
AIG 0.0 0.0  --   
 
  
Recent Labs  
  09/27/19 0345 09/26/19 0348 09/25/19 0239  143 144  
K 3.8 3.4* 3.7 * 113* 114* CO2 24 22 24 AGAP 7 8 6* GLU 98 99 104* BUN 6* 10 9 CREA 0.75 0.76 0.75 GFRAA >60 >60 >60 GFRNA >60 >60 >60  
CA 7.8* 8.0* 7.7* SGOT 35  --   --   
AP 71  --   --   
TP 6.0*  --   --   
ALB 2.4*  --   --   
GLOB 3.6*  --   --   
AGRAT 0.7*  --   --   
 
 
 
Imaging: XR CHEST PA LAT [[id number]] Collected: 09/24/19 1617 Order Status: Completed Updated: 09/24/19 1620 Narrative:    
CHEST X-RAY, 2 views 9/24/2019 History: Tachypnea and fever. Technique: PA and lateral views of the chest.  
 
Comparison: Chest x-ray 9/21/2019 Findings: A stable left-sided venous port is seen. The cardiac silhouette is mildly 
enlarged although stable.  The lungs are expanded without evidence for 
pneumothorax.  No evolving consolidation, or evidence of pleural effusion is 
seen. The bony thorax demonstrates no acute changes.  The upper abdomen is 
unremarkable in appearance. Impression:    
IMPRESSION:  
1.  Stable cardiomegaly without evolving acute changes evident by plain film 
imaging. XR CHEST PA LAT [[id number]] Collected: 09/21/19 2042 Order Status: Completed Updated: 09/21/19 2047 Narrative:    
EXAM: Chest x-ray. INDICATION: Febrile neutropenia. COMPARISON: May 18, 2019. TECHNIQUE: Frontal and lateral x-rays of the chest were obtained. FINDINGS: The lungs are clear except for minimal linear atelectasis or scarring 
in the lateral left costophrenic angle. The cardiac size, mediastinal contour 
and pulmonary vasculature are within normal limits. No pneumothorax or pleural 
effusion is seen. A left chest wall infusion port catheter remains in place. Impression:    
IMPRESSION: No acute process. Medications: 
Current Facility-Administered Medications Medication Dose Route Frequency  furosemide (LASIX) injection 20 mg  20 mg IntraVENous ONCE  
 saline peripheral flush soln 10 mL  10 mL InterCATHeter RAD ONCE  
 sodium chloride 0.9 % bolus infusion 100 mL  100 mL IntraVENous RAD ONCE  
 iopamidol (ISOVUE-370) 76 % injection 100 mL  100 mL IntraVENous RAD ONCE  
 loperamide (IMODIUM) capsule 2 mg  2 mg Oral Q4H PRN  
 diphenoxylate-atropine (LOMOTIL) tablet 2 Tab  2 Tab Oral QID PRN  
 meropenem (MERREM) 500 mg in 0.9% sodium chloride (MBP/ADV) 50 mL  0.5 g IntraVENous Q6H  
 0.9% sodium chloride infusion 250 mL  250 mL IntraVENous PRN  
 voriconazole (VFEND) tablet 200 mg  200 mg Oral Q12H  potassium chloride (K-DUR, KLOR-CON) SR tablet 20 mEq  20 mEq Oral BID  polyethylene glycol (MIRALAX) packet 17 g  17 g Oral DAILY PRN  
 acetaminophen (TYLENOL) tablet 650 mg  650 mg Oral Q6H PRN  
 gilteritinib tab (Xospata) 120 mg = 3 tabs  (Patient Supplied)  120 mg Oral PCL  sodium chloride (NS) flush 5-10 mL  5-10 mL IntraVENous PRN  
 DULoxetine (CYMBALTA) capsule 30 mg  30 mg Oral DAILY  LORazepam (ATIVAN) tablet 1 mg  1 mg Oral QHS  pravastatin (PRAVACHOL) tablet 10 mg  10 mg Oral QHS  zolpidem (AMBIEN) tablet 5 mg  5 mg Oral QHS PRN  
 acyclovir (ZOVIRAX) tablet 400 mg  400 mg Oral BID  
  influenza vaccine 2019-20 (6 mos+)(PF) (FLUARIX/FLULAVAL/FLUZONE QUAD) injection 0.5 mL  0.5 mL IntraMUSCular PRIOR TO DISCHARGE  diphenhydrAMINE (BENADRYL) capsule 50 mg  50 mg Oral QHS PRN  
 
 
 
ASSESSMENT: 
 
Problem List  Date Reviewed: 9/19/2019 Codes Class Noted * (Principal) Febrile neutropenia (HCC) ICD-10-CM: D70.9, R50.81 ICD-9-CM: 288.00, 780.61  9/21/2019 Pancytopenia due to antineoplastic chemotherapy [facility]) ICD-10-CM: D61.810, T45.1X5A 
ICD-9-CM: 284.11, E933.1  6/12/2019 Cellulitis of neck ICD-10-CM: I93.685 ICD-9-CM: 682.1  6/12/2019 Immunocompromised status associated with infection ([facility] ICD-10-CM: D84.9, B99.9 ICD-9-CM: 279.3, 136.9  6/12/2019 Port or reservoir infection ICD-10-CM: W63.467T ICD-9-CM: 999.33  6/12/2019 Acute myeloid leukemia not having achieved remission ([facility] ICD-10-CM: C92.00 ICD-9-CM: 205.00  5/9/2019 Admission for antineoplastic chemotherapy ICD-10-CM: Z51.11 ICD-9-CM: V58.11  5/5/2019 AML (acute myeloblastic leukemia) ([facility] ICD-10-CM: C92.00 ICD-9-CM: 205.00  4/28/2019 Weakness generalized ICD-10-CM: R53.1 ICD-9-CM: 780.79  4/28/2019 Pancytopenia ([facility] ICD-10-CM: N47.773 ICD-9-CM: 284.19  4/28/2019 Thrombocytopenia ([facility] ICD-10-CM: D69.6 ICD-9-CM: 287.5  4/27/2019 Ms. Kathy Virgen is a 68year old female who was admitted on 9/21/2019 for neutropenic fever. She has been having intermittent low grade fevers over the last week. She came to infusion yesterday for a blood transfusion, and after going home she had a fever of 102.5 and was sent to the ER for evaluation. She is a known patient of Dr. Dajuan Dolan with AML. Initially treated with induction 7+3 and Midostaurin. Most recently on Decitabine plus Gilteritinib with cycle 2 beginning on 8/26/2019 and cycle 3 due 9/23/2019. CXR negative. UA clear. BC/UC NTD. Started on zosyn and vanc. PLAN: 
AML -On Dacogen/Gilteritinib with most recent cycle 2 on 8/26/2019 with cycle 3 due 9/23/2019 9/23 Discuss BMbx flow results with patient-persistent AML-58% blasts. Still waiting on final pathology. 9/24 Bone marrow biopsy final path still pending.  
  
Neutropenic Fever 
-UA clear, BC/UC NTD 
-On Zosyn/Vanc empirically 9/23 Tmax 100.8. Feeling better today. Day 2 zosyn and vanc. BC/UC NTD 
9/24 Tmax 100.6. Day 3 vanc/zosyn. BC/UC still NGTD.  
9/25 Check aspergillus, fungitell, CMV. Add antifungal - vfend. CXR and BCx repeated yesterday and negative 9/26 BCx remain negative. Repeated this morning. Cdiff negative. D/c vancomycin. Continue zosyn, acyclovir, voriconazole. Consult ID for additional recommendations. Of note, gilteritinib can cause fevers in 13-35% of patients. Unclear timeline for febrile episodes? Will try to contact rep to discuss 9/27 Appreciate ID support. Changed to merrem. Given shortness of breath this morning, will proceed with CT CAP to eval for source. Could still be from drug vs disease as well Diarrhea 9/26 Cdiff negative. Add imodium/lomotil PRN Pancytopenia related to chemo 9/23 hgb 6.5 transfuse per [facility] SOPs 
9/24 Hgb up to 7.4.  
9/27 Counts still dropping. No transfusional needs today Dyspnea 9/27 BNP elevated at 458. Check echo. DC fluids. Lasix x 1 Goals and plan of care reviewed with the patient. All questions answered to the best of our ability. Bryan Sherwood NP [facility] Hematology and Oncology [id number] [address] Office : [phone] Fax : [phone] Attending Addendum: 
Patient seen with NP. Miguelosvaldo Ramon is a 79yo woman admitted on 9/21 with neutropenic fever.  Pt of Dr Adal Gonzalez with AML.   S/p induction 7+3/Midostaurin and most recently on Decitabine and Gilteritinib.  C3 was due 9/23/19, and s/p C2 8/26/19.  She presented to [facility] infusion for blood transfusion and upon arriving home developed fevers of 102.5 and presented to ED for further evaluation.  She was started on broad spectrum antibiotics.  Infx workup neg thus far.  checking aperg, fungitell, CMV.  CXR and bld cultures negative thus far.  ?Gilteritinib as cause of fevers if all else negative? - message left with pharm rep. No prior fevers on the drug. will check CT CAP. Dyspnea today - BNP up - will get echo and IV furosemide. VSS.  Labs reviewed, transfuse per protocol.  BMbx with flow blasts at 58%.  BMBx with leukemia, FLT3-ITD. D/w Dr Bhavna Negrete - primary oncologist, will stay the course.  I personally performed a face to face diagnostic evaluation on this patient.  My findings are as follows: A&ox3, feels ok this am, sad due to fevers, lungs clear, heart regular, abdomen benign and no LE edema.  C/w supportive care.  + loose BM: neg C diff. ID rec merrem, and c/w vfend and acyclovir.       
  
I have reviewed and agree with the care plan.     
  
  
 
  
Mayela Clark MD 
[facility] Hematology and Oncology [address] [place] [address], [address] [place] [address] Office : [phone] Fax : [phone]

## 2019-09-27 NOTE — PROGRESS NOTES
END OF SHIFT NOTE: 
 
Intake/Output 
09/26 1901 - 09/27 0700 In: -  
Out: 800 [Urine:800] Voiding: YES Catheter: NO 
Drain:   
 
 
 
 
 
Stool:  0 occurrences. Stool Assessment Stool Color: Green;Brown (09/25/19 2001) Stool Appearance: Mucous; Loose (09/26/19 0746) Stool Amount: Small (09/25/19 2001) Stool Source/Status: Rectum (09/25/19 2001) Emesis:  0 occurrences. VITAL SIGNS Patient Vitals for the past 12 hrs: 
 Temp Pulse Resp BP SpO2  
09/27/19 0534 98.3 °F (36.8 °C)      
09/27/19 0338 (!) 102.9 °F (39.4 °C) 93 22 146/70 92 %  
09/26/19 2341 99.4 °F (37.4 °C) 85 18 146/62 96 %  
09/26/19 1935 99.5 °F (37.5 °C) 82 18 140/63 98 % Pain Assessment Pain 1 Pain Scale 1: Numeric (0 - 10) (09/27/19 8975) Pain Intensity 1: 0 (09/27/19 0338) Patient Stated Pain Goal: 0 (09/27/19 0129) Pain Reassessment 1: Patient resting w/respiratory rate greater than 10 (09/24/19 1400) Pain Onset 1: 20 minutes (09/23/19 0334) Pain Location 1: Head (09/23/19 0334) Pain Orientation 1: Anterior (09/23/19 0334) Pain Description 1: Dull (09/23/19 0334) Pain Intervention(s) 1: Medication (see MAR) (09/23/19 0334) Ambulating Yes Additional Information: Patient rested well. When I went to draw blood this morning, patient was short of breath and coughing. Got up to bathroom and became more short of breath so placed 1.5L of O2 NC on her. She immediately started to feel better. Patient requested to keep O2 in. VSS. No needs voiced. Shift report given to oncoming nurse at the bedside. Ashely Lima

## 2019-09-27 NOTE — PROGRESS NOTES
Pt's temperature of 102.8. Tylenol given as ordered. Alexia Show, NP made aware. No new orders received. Will continue to monitor.

## 2019-09-27 NOTE — PROGRESS NOTES
SW reviewed patient's chart on this date. ID consult completed, and ordered CT C/A/P. Anticipate plan for DC to home whenever medically ready. SW will continue to monitor case for possible social / DC needs.

## 2019-09-27 NOTE — CONSULTS
Infectious Disease Consult Today's Date: 9/27/2019 Admit Date: 9/21/2019 Impression: · Neutropenic fever - no localzing symptoms, unclear etiology- drug reaction vs AML vs infection · AML- dx 4/2019 - thus far not responding to treatment 58% blasts Plan:  
·  stop vanc · Switch zosyn to [facility] · Cont voriconazole · If fevers do not resolve, will do CT C/A/P as next step to excluding infection Anti-infectives: · Vanc 9/21-9/26 · Zosyn 9/21-9/26 · merrem 9/26 - current · Voriconazole 9/25- current · Acyclovir prophy Subjective:  
Date of Consultation:  September 27, 2019 Referring Physician: Melissa Hillary Patient is a 68 y.o. female diagnosed with AML in 4/2019. Went through 7+3 induction and then most recnetly on dacogen/gilteritnib 8/26/19. Pt was doing well at home except for a week prior to admission kept having temps in the 99s. Once it got up to 101 came in. No problems with port, multiple cultures neg. Diarrhea started after abx in the hospital. C.diff neg. No runny nose, sore throat or URI symptoms. No pain. Overall feeling ok - can't even tell when the fevers are there. No other hardware aside port. procal neg. CMV pending, fungitell pending. HIV and HCV neg in 4/2019 Patient Active Problem List  
Diagnosis Code  Thrombocytopenia ([address] D69.6  AML (acute myeloblastic leukemia) (HCC) C92.00  Weakness generalized R53.1  Pancytopenia ([address] V03.720  Admission for antineoplastic chemotherapy Z51.11  
 Acute myeloid leukemia not having achieved remission ([address] C92.00  Pancytopenia due to antineoplastic chemotherapy ([address] D61.810, T45.1X5A  
 Cellulitis of neck L03.221  Immunocompromised status associated with infection (HCC) D84.9, B99.9  Port or reservoir infection B74.153P  Febrile neutropenia (HCC) D70.9, R50.81 Past Medical History:  
Diagnosis Date  AML (acute myeloid leukemia) ([address] dx- 4/2019  
 followed by dr Luis Antonio Johnson  Depression  Hypercholesterolemia  Infection   
 of port -- was placed 5/2019-- removed 6/2019---right chest  
 Psychiatric disorder   
 aniexty  Sleep apnea Family History Problem Relation Age of Onset  Cancer Father Social History Tobacco Use  Smoking status: Never Smoker  Smokeless tobacco: Never Used Substance Use Topics  Alcohol use: Never Frequency: Never Past Surgical History:  
Procedure Laterality Date  HX OTHER SURGICAL    
 colonoscopy  HX VASCULAR ACCESS    
 IR INSERT TUNL CVC W PORT OVER 5 YEARS  4/30/2019  IR INSERT TUNL CVC W PORT OVER 5 YEARS  7/15/2019  IR REMOVE TUNL CVAD W PORT/PUMP  6/13/2019 Prior to Admission medications Medication Sig Start Date End Date Taking? Authorizing Provider  
fluconazole (DIFLUCAN) 200 mg tablet Take 1 Tab by mouth daily for 30 days. FDA advises cautious prescribing of oral fluconazole in pregnancy. 8/29/19 9/28/19 Yes Christine Jay NP  
acyclovir (ZOVIRAX) 400 mg tablet Take 1 Tab by mouth two (2) times a day for 30 days. 8/29/19 9/28/19 Yes Lelo Carbajal NP  
gilteritinib (XOSPATA) 40 mg tab Take 120 mg by mouth daily (with lunch). Indications: acute myeloid leukemia with FLT3 mutation 8/22/19  Yes Radha Knight MD  
DULoxetine (CYMBALTA) 30 mg capsule Take 1 Cap by mouth daily. 8/5/19  Yes Radha Knight MD  
zolpidem (AMBIEN) 5 mg tablet Take 1 Tab by mouth nightly as needed for Sleep. Max Daily Amount: 5 mg. 8/1/19  Yes Ashley Tran NP  
lidocaine-prilocaine (EMLA) topical cream Apply  to affected area as needed for Pain. 7/18/19  Yes Rosie Montoya NP  
cholecalciferol (VITAMIN D3) 400 unit tab tablet Take 2 Tabs by mouth daily. 6/7/19  Yes Slime Fox NP  
ondansetron hcl (ZOFRAN) 8 mg tablet Take 1 Tab by mouth every eight (8) hours as needed for Nausea.  4/30/19  Yes Armin Dodge NP  
vit C/E/zinc/lutein/zeaxanthin ([address] PO) Take 1 Tab by mouth daily. Yes Provider, Historical  
LORazepam (ATIVAN) 1 mg tablet Take  by mouth nightly. Yes Provider, Historical  
acetaminophen 500 mg tab 500 mg, diphenhydrAMINE 25 mg cap 25 mg Take  by mouth nightly. Yes Provider, Historical  
pravastatin (PRAVACHOL) 10 mg tablet Take  by mouth nightly. Yes Provider, Historical  
 
 
No Known Allergies Review of Systems:  A comprehensive review of systems was negative except for that written in the History of Present Illness. Objective:  
 
Visit Vitals /58 (BP 1 Location: Left arm, BP Patient Position: Sitting) Pulse 85 Temp 97.9 °F (36.6 °C) Resp 18 Ht 5' 6\" (1.676 m) Wt 91.6 kg (202 lb) SpO2 98% Breastfeeding? No  
BMI 32.60 kg/m² Temp (24hrs), Av.5 °F (37.5 °C), Min:97.9 °F (36.6 °C), Max:102.9 °F (39.4 °C) Lines:  L chest port c/d/i Physical Exam:   
General:  Alert, cooperative, well noursished, well developed, appears stated age Eyes:  Sclera anicteric. Pupils equally round and reactive to light. Mouth/Throat: Mucous membranes normal, oral pharynx clear Neck: Supple Lungs:   Clear to auscultation bilaterally, good effort CV:  Regular rate and rhythm,no murmur, click, rub or gallop Abdomen:   Soft, non-tender. bowel sounds normal. non-distended Extremities: No cyanosis or edema Skin: Skin color, texture, turgor normal. no acute rash or lesions Lymph nodes: Cervical and supraclavicular normal  
Musculoskeletal: No swelling or deformity Lines/Devices:  Intact, no erythema, drainage or tenderness Psych: Alert and oriented, normal mood affect given the setting Data Review: CBC: 
Recent Labs  
  19 
0345 19 
0348 19 
0239 WBC 0.5* 0.5* 0.4* GRANS 4* 2*  --   
MONOS 6 6  --   
EOS 2 2  --   
ANEU 0.0* 0.0*  -- ABL 0.5 0.5  --   
HGB 7.3* 8.1* 6.9*  
HCT 21.8* 23.6* 20.5* PLT 22* 42* 5* BMP: 
Recent Labs  
  19 
0345 19 
0348 19 
0239 CREA 0.75 0.76 0.75  
BUN 6* 10 9  143 144  
K 3.8 3.4* 3.7 * 113* 114* CO2 24 22 24 AGAP 7 8 6* GLU 98 99 104* LFTS: 
Recent Labs  
  09/27/19 
0345 TBILI 0.4 ALT 31 SGOT 35 AP 71  
TP 6.0* ALB 2.4* Microbiology:  
 
All Micro Results Procedure Component Value Units Date/Time C. DIFFICILE AG & TOXIN A/B [[id number]] Collected:  09/25/19 1703 Order Status:  Completed Specimen:  Stool Updated:  09/26/19 1203 [address] ANTIGEN    
  C. DIFFICILE GDH ANTIGEN-NEGATIVE  
     
  C. difficile toxin C. DIFFICILE TOXIN-NEGATIVE  
     
  PCR Reflex NOT APPLICABLE INTERPRETATION    
  NEGATIVE FOR TOXIGENIC C. DIFFICILE Clinical Consideration NEGATIVE FOR TOXIGENIC C. DIFFICILE  
     
 CULTURE, BLOOD [[id number]] Collected:  09/26/19 1050 Order Status:  Completed Specimen:  Blood Updated:  09/26/19 1120 CULTURE, BLOOD [[id number]] Collected:  09/26/19 0753 Order Status:  Completed Specimen:  Blood Updated:  09/26/19 1119 CULTURE, BLOOD [[id number]] Collected:  09/24/19 1530 Order Status:  Completed Specimen:  Blood Updated:  09/26/19 8169 Special Requests: --     
  LEFT 
HAND Culture result: NO GROWTH 2 DAYS     
 CULTURE, BLOOD [[id number]] Collected:  09/24/19 1536 Order Status:  Completed Specimen:  Blood Updated:  09/26/19 9746 Special Requests: LATERAL PORT Culture result: NO GROWTH 2 DAYS     
 CULTURE, BLOOD [[id number]] Collected:  09/21/19 1903 Order Status:  Completed Specimen:  Blood Updated:  09/26/19 0538 Special Requests: --     
  LEFT Antecubital 
  
  Culture result: NO GROWTH 5 DAYS     
 CULTURE, BLOOD [[id number]] Collected:  09/21/19 1850 Order Status:  Completed Specimen:  Blood Updated:  09/26/19 0282 Special Requests: --     
  LEFT 
PORT Culture result: NO GROWTH 5 DAYS     
 CMV BY PCR, QT [[id number]] Collected:  09/25/19 2016 Order Status:  Completed Specimen:  Blood Updated:  09/25/19 2024 CULTURE, URINE [[id number]] Collected:  09/21/19 2039 Order Status:  Completed Specimen:  Urine from Clean catch Updated:  09/24/19 0700 Special Requests: NO SPECIAL REQUESTS Culture result:    
  <10,000 COLONIES/mL MIXED SKIN REGGIE ISOLATED Imaging: CXR 9/24 - no PNA Signed By: Jenny Funk MD   
 September 27, 2019

## 2019-09-28 LAB
1,3 BETA GLUCAN SER-MCNC: <31 PG/ML
ALBUMIN SERPL-MCNC: 2.3 G/DL (ref 3.2–4.6)
ALBUMIN/GLOB SERPL: 0.6 {RATIO} (ref 1.2–3.5)
ALP SERPL-CCNC: 79 U/L (ref 50–136)
ALT SERPL-CCNC: 35 U/L (ref 12–65)
ANION GAP SERPL CALC-SCNC: 6 MMOL/L (ref 7–16)
AST SERPL-CCNC: 33 U/L (ref 15–37)
BILIRUB SERPL-MCNC: 0.4 MG/DL (ref 0.2–1.1)
BUN SERPL-MCNC: 10 MG/DL (ref 8–23)
CALCIUM SERPL-MCNC: 7.9 MG/DL (ref 8.3–10.4)
CHLORIDE SERPL-SCNC: 112 MMOL/L (ref 98–107)
CMV DNA SERPL NAA+PROBE-ACNC: NEGATIVE IU/ML
CMV DNA SERPL NAA+PROBE-LOG IU: NORMAL LOG10 IU/ML
CO2 SERPL-SCNC: 27 MMOL/L (ref 21–32)
CREAT SERPL-MCNC: 0.74 MG/DL (ref 0.6–1)
DIFFERENTIAL METHOD BLD: ABNORMAL
ERYTHROCYTE [DISTWIDTH] IN BLOOD BY AUTOMATED COUNT: 14.7 % (ref 11.9–14.6)
GLOBULIN SER CALC-MCNC: 3.7 G/DL (ref 2.3–3.5)
GLUCOSE SERPL-MCNC: 88 MG/DL (ref 65–100)
HCT VFR BLD AUTO: 21 % (ref 35.8–46.3)
HGB BLD-MCNC: 7 G/DL (ref 11.7–15.4)
MCH RBC QN AUTO: 29.5 PG (ref 26.1–32.9)
MCHC RBC AUTO-ENTMCNC: 33.3 G/DL (ref 31.4–35)
MCV RBC AUTO: 88.6 FL (ref 79.6–97.8)
NRBC # BLD: 0 K/UL (ref 0–0.2)
PLATELET # BLD AUTO: 14 K/UL (ref 150–450)
PLATELET COMMENTS,PCOM: ABNORMAL
PMV BLD AUTO: 10.5 FL (ref 9.4–12.3)
POTASSIUM SERPL-SCNC: 3.6 MMOL/L (ref 3.5–5.1)
PROT SERPL-MCNC: 6 G/DL (ref 6.3–8.2)
RBC # BLD AUTO: 2.37 M/UL (ref 4.05–5.2)
RBC MORPH BLD: ABNORMAL
SODIUM SERPL-SCNC: 145 MMOL/L (ref 136–145)
WBC # BLD AUTO: 0.5 K/UL (ref 4.3–11.1)
WBC MORPH BLD: ABNORMAL

## 2019-09-28 PROCEDURE — 85025 COMPLETE CBC W/AUTO DIFF WBC: CPT

## 2019-09-28 PROCEDURE — 74011250637 HC RX REV CODE- 250/637: Performed by: INTERNAL MEDICINE

## 2019-09-28 PROCEDURE — 74011000258 HC RX REV CODE- 258: Performed by: INTERNAL MEDICINE

## 2019-09-28 PROCEDURE — 86644 CMV ANTIBODY: CPT

## 2019-09-28 PROCEDURE — P9040 RBC LEUKOREDUCED IRRADIATED: HCPCS

## 2019-09-28 PROCEDURE — 74011250637 HC RX REV CODE- 250/637: Performed by: NURSE PRACTITIONER

## 2019-09-28 PROCEDURE — 74011250636 HC RX REV CODE- 250/636: Performed by: INTERNAL MEDICINE

## 2019-09-28 PROCEDURE — 99232 SBSQ HOSP IP/OBS MODERATE 35: CPT | Performed by: INTERNAL MEDICINE

## 2019-09-28 PROCEDURE — 77030003560 HC NDL HUBR BARD -A

## 2019-09-28 PROCEDURE — 36430 TRANSFUSION BLD/BLD COMPNT: CPT

## 2019-09-28 PROCEDURE — 65270000029 HC RM PRIVATE

## 2019-09-28 PROCEDURE — 86923 COMPATIBILITY TEST ELECTRIC: CPT

## 2019-09-28 PROCEDURE — 80053 COMPREHEN METABOLIC PANEL: CPT

## 2019-09-28 PROCEDURE — 36591 DRAW BLOOD OFF VENOUS DEVICE: CPT

## 2019-09-28 PROCEDURE — 86900 BLOOD TYPING SEROLOGIC ABO: CPT

## 2019-09-28 RX ORDER — ACETAMINOPHEN 325 MG/1
650 TABLET ORAL ONCE
Status: COMPLETED | OUTPATIENT
Start: 2019-09-28 | End: 2019-09-28

## 2019-09-28 RX ORDER — FUROSEMIDE 10 MG/ML
20 INJECTION INTRAMUSCULAR; INTRAVENOUS ONCE
Status: COMPLETED | OUTPATIENT
Start: 2019-09-28 | End: 2019-09-28

## 2019-09-28 RX ORDER — SODIUM CHLORIDE 9 MG/ML
250 INJECTION, SOLUTION INTRAVENOUS AS NEEDED
Status: DISCONTINUED | OUTPATIENT
Start: 2019-09-28 | End: 2019-09-30 | Stop reason: SDUPTHER

## 2019-09-28 RX ADMIN — VORICONAZOLE 200 MG: 200 TABLET, FILM COATED ORAL at 09:26

## 2019-09-28 RX ADMIN — MEROPENEM 500 MG: 500 INJECTION, POWDER, FOR SOLUTION INTRAVENOUS at 17:22

## 2019-09-28 RX ADMIN — MEROPENEM 500 MG: 500 INJECTION, POWDER, FOR SOLUTION INTRAVENOUS at 21:06

## 2019-09-28 RX ADMIN — ACETAMINOPHEN 650 MG: 325 TABLET, FILM COATED ORAL at 20:09

## 2019-09-28 RX ADMIN — POTASSIUM CHLORIDE 20 MEQ: 20 TABLET, EXTENDED RELEASE ORAL at 17:22

## 2019-09-28 RX ADMIN — ACETAMINOPHEN 650 MG: 325 TABLET, FILM COATED ORAL at 18:08

## 2019-09-28 RX ADMIN — FUROSEMIDE 20 MG: 10 INJECTION, SOLUTION INTRAMUSCULAR; INTRAVENOUS at 13:37

## 2019-09-28 RX ADMIN — ACYCLOVIR 400 MG: 800 TABLET ORAL at 17:22

## 2019-09-28 RX ADMIN — DIPHENOXYLATE HYDROCHLORIDE AND ATROPINE SULFATE 2 TABLET: 2.5; .025 TABLET ORAL at 11:25

## 2019-09-28 RX ADMIN — VORICONAZOLE 200 MG: 200 TABLET, FILM COATED ORAL at 20:10

## 2019-09-28 RX ADMIN — DULOXETINE 30 MG: 30 CAPSULE, DELAYED RELEASE ORAL at 09:26

## 2019-09-28 RX ADMIN — LORAZEPAM 1 MG: 1 TABLET ORAL at 21:11

## 2019-09-28 RX ADMIN — ACYCLOVIR 400 MG: 800 TABLET ORAL at 09:26

## 2019-09-28 RX ADMIN — MEROPENEM 500 MG: 500 INJECTION, POWDER, FOR SOLUTION INTRAVENOUS at 03:44

## 2019-09-28 RX ADMIN — ACETAMINOPHEN 650 MG: 325 TABLET, FILM COATED ORAL at 11:23

## 2019-09-28 RX ADMIN — DIPHENHYDRAMINE HYDROCHLORIDE 25 MG: 25 CAPSULE ORAL at 18:08

## 2019-09-28 RX ADMIN — MEROPENEM 500 MG: 500 INJECTION, POWDER, FOR SOLUTION INTRAVENOUS at 09:26

## 2019-09-28 RX ADMIN — PRAVASTATIN SODIUM 10 MG: 20 TABLET ORAL at 21:07

## 2019-09-28 RX ADMIN — LOPERAMIDE HYDROCHLORIDE 2 MG: 2 CAPSULE ORAL at 09:32

## 2019-09-28 RX ADMIN — ACETAMINOPHEN 650 MG: 325 TABLET, FILM COATED ORAL at 06:12

## 2019-09-28 RX ADMIN — DIPHENHYDRAMINE HYDROCHLORIDE 50 MG: 25 CAPSULE ORAL at 21:06

## 2019-09-28 RX ADMIN — POTASSIUM CHLORIDE 20 MEQ: 20 TABLET, EXTENDED RELEASE ORAL at 09:26

## 2019-09-28 NOTE — PROGRESS NOTES
END OF SHIFT NOTE: 
 
Intake/Output 
09/27 1901 - 09/28 0700 In: -  
Out: 3538 [MQAOR:[id number]] Voiding: YES Catheter: NO 
Drain:   
 
 
 
 
 
Stool:  2 occurrences. Stool Assessment Stool Color: Green;Brown (09/25/19 2001) Stool Appearance: Loose (09/27/19 0731) Stool Amount: Small (09/25/19 2001) Stool Source/Status: Rectum (09/25/19 2001) Emesis:  0 occurrences. VITAL SIGNS Patient Vitals for the past 12 hrs: 
 Temp Pulse Resp BP SpO2  
09/27/19 2022 (!) 102.3 °F (39.1 °C)      
09/27/19 1938 (!) 102.8 °F (39.3 °C) 89 18 154/61 96 %  
09/27/19 1118 99.4 °F (37.4 °C) 98 18 148/68 98 % Pain Assessment Pain 1 Pain Scale 1: Numeric (0 - 10) (09/27/19 1400) Pain Intensity 1: 0 (09/27/19 1400) Patient Stated Pain Goal: 0 (09/27/19 1400) Pain Reassessment 1: Patient resting w/respiratory rate greater than 10 (09/24/19 1400) Pain Onset 1: 20 minutes (09/23/19 0334) Pain Location 1: Head (09/23/19 0334) Pain Orientation 1: Anterior (09/23/19 0334) Pain Description 1: Dull (09/23/19 0334) Pain Intervention(s) 1: Medication (see MAR) (09/23/19 0334) Ambulating Yes Additional Information:  
-SOB w/exertion, Lasix x1 IV given. +2/+3 pitting edema. BNP elevated 
-Scattered ecchymosis, redness noted around Port dressing  
-CT C/A/P completed today  
-Febrile at shift change Shift report given to oncoming nurse at the bedside.  
 
Dulce Zambrano RN

## 2019-09-28 NOTE — PROGRESS NOTES
Problem: Falls - Risk of 
Goal: *Absence of Falls Description Document Mariel Valerio Fall Risk and appropriate interventions in the flowsheet. Outcome: Progressing Towards Goal 
Note:  
Fall Risk Interventions: 
Mobility Interventions: Communicate number of staff needed for ambulation/transfer, Patient to call before getting OOB Medication Interventions: Patient to call before getting OOB, Teach patient to arise slowly Elimination Interventions: Toileting schedule/hourly rounds, Patient to call for help with toileting needs, Toilet paper/wipes in reach History of Falls Interventions: Room close to nurse's station

## 2019-09-28 NOTE — PROGRESS NOTES
[facility] Inpatient Hematology / Oncology Progress Note Admission Date: 2019  6:30 PM 
Reason for Admission/Hospital Course: Febrile neutropenia ([address] [D70.9, R50.81] 24 Hour Events: 
Ongoing fevers, vitals stable Up in room Tearful this morning Continues acyclovir, vfend, merrem CT CAP no source of infection, no PE Diarrhea better Felt dyspneic yesterday - improved with lasix, net negative 1500ml ROS: 
Constitutional: negative for fever, chills, weakness, malaise, fatigue. CV:  negative for chest pain, palpitations, edema. Respiratory:  negative for dyspnea, cough, wheezing. GI: + diarrhea - improved. negative for nausea, abdominal pain. 10 point review of systems is otherwise negative with the exception of the elements mentioned above in the HPI. No Known Allergies OBJECTIVE: 
Patient Vitals for the past 8 hrs: 
 BP Temp Pulse Resp SpO2  
19 1114 126/55 (!) 100.8 °F (38.2 °C) 93 20 91 %  
19 0743 135/63 98.1 °F (36.7 °C) 89 18 93 % 19 0610  (!) 101.4 °F (38.6 °C)    Temp (24hrs), Av.1 °F (37.8 °C), Min:98.1 °F (36.7 °C), Max:102.8 °F (39.3 °C) 
 
 0701 -  1900 In: 600 [P.O.:600] Out: 400 [Urine:400] Physical Exam: 
Constitutional: Well developed, well nourished female in no acute distress, sitting up on side of bed HEENT: Normocephalic and atraumatic. Oropharynx is clear, mucous membranes are moist.  Pupils are equal, round, and reactive to light. Extraocular muscles are intact. Sclerae anicteric. Patchy alopecia Skin Warm and dry. No bruising and no rash noted. No erythema. + pallor Respiratory Lungs are clear to auscultation bilaterally without wheezes, rales or rhonchi, normal air exchange without accessory muscle use. CVS Normal rate, regular rhythm and normal S1 and S2. No murmurs, gallops, or rubs. Abdomen Soft, nontender and nondistended, normoactive bowel sounds.   No palpable mass. No hepatosplenomegaly. Neuro Grossly nonfocal with no obvious sensory or motor deficits. MSK Normal range of motion in general.  No edema and no tenderness. Psych Appropriate mood and affect. Labs: 
   
Recent Labs  
  09/28/19 0343 09/27/19 0345 09/26/19 0348 WBC 0.5* 0.5* 0.5* RBC 2.37* 2.47* 2.71* HGB 7.0* 7.3* 8.1* HCT 21.0* 21.8* 23.6* MCV 88.6 88.3 87.1 MCH 29.5 29.6 29.9 MCHC 33.3 33.5 34.3  
RDW 14.7* 14.4 14.3 PLT 14* 22* 42* GRANS  --  4* 2*  
LYMPH  --  86* 90* MONOS  --  6 6 EOS  --  2 2 BASOS  --  0 0 IG  --  2 0  
DF AUTOMATED AUTOMATED AUTOMATED ANEU  --  0.0* 0.0* ABL  --  0.5 0.5 ABM  --  0.0* 0.0* ALEKSANDR  --  0.0 0.0 ABB  --  0.0 0.0 AIG  --  0.0 0.0 Recent Labs  
  09/28/19 0343 09/27/19 0345 09/26/19 0348  144 143  
K 3.6 3.8 3.4*  
* 113* 113* CO2 27 24 22 AGAP 6* 7 8 GLU 88 98 99 BUN 10 6* 10  
CREA 0.74 0.75 0.76 GFRAA >60 >60 >60 GFRNA >60 >60 >60  
CA 7.9* 7.8* 8.0*  
SGOT 33 35  --   
AP 79 71  --   
TP 6.0* 6.0*  --   
ALB 2.3* 2.4*  --   
GLOB 3.7* 3.6*  --   
AGRAT 0.6* 0.7*  --   
 
 
 
Imaging: XR CHEST PA LAT [[id number]] Collected: 09/24/19 1617 Order Status: Completed Updated: 09/24/19 1620 Narrative:    
CHEST X-RAY, 2 views 9/24/2019 History: Tachypnea and fever. Technique: PA and lateral views of the chest.  
 
Comparison: Chest x-ray 9/21/2019 Findings: A stable left-sided venous port is seen. The cardiac silhouette is mildly 
enlarged although stable.  The lungs are expanded without evidence for 
pneumothorax.  No evolving consolidation, or evidence of pleural effusion is 
seen. The bony thorax demonstrates no acute changes.  The upper abdomen is 
unremarkable in appearance. Impression:    
IMPRESSION:  
1.  Stable cardiomegaly without evolving acute changes evident by plain film 
imaging. XR CHEST PA LAT [[id number]] Collected: 09/21/19 2042 Order Status: Completed Updated: 09/21/19 2047 Narrative:    
EXAM: Chest x-ray. INDICATION: Febrile neutropenia. COMPARISON: May 18, 2019. TECHNIQUE: Frontal and lateral x-rays of the chest were obtained. FINDINGS: The lungs are clear except for minimal linear atelectasis or scarring 
in the lateral left costophrenic angle. The cardiac size, mediastinal contour 
and pulmonary vasculature are within normal limits. No pneumothorax or pleural 
effusion is seen. A left chest wall infusion port catheter remains in place. Impression:    
IMPRESSION: No acute process. Medications: 
Current Facility-Administered Medications Medication Dose Route Frequency  loperamide (IMODIUM) capsule 2 mg  2 mg Oral Q4H PRN  
 diphenoxylate-atropine (LOMOTIL) tablet 2 Tab  2 Tab Oral QID PRN  
 meropenem (MERREM) 500 mg in 0.9% sodium chloride (MBP/ADV) 50 mL  0.5 g IntraVENous Q6H  
 0.9% sodium chloride infusion 250 mL  250 mL IntraVENous PRN  
 voriconazole (VFEND) tablet 200 mg  200 mg Oral Q12H  potassium chloride (K-DUR, KLOR-CON) SR tablet 20 mEq  20 mEq Oral BID  polyethylene glycol (MIRALAX) packet 17 g  17 g Oral DAILY PRN  
 acetaminophen (TYLENOL) tablet 650 mg  650 mg Oral Q6H PRN  
 [Held by provider] gilteritinib tab (Xospata) 120 mg = 3 tabs  (Patient Supplied)  120 mg Oral PCL  sodium chloride (NS) flush 5-10 mL  5-10 mL IntraVENous PRN  
 DULoxetine (CYMBALTA) capsule 30 mg  30 mg Oral DAILY  LORazepam (ATIVAN) tablet 1 mg  1 mg Oral QHS  pravastatin (PRAVACHOL) tablet 10 mg  10 mg Oral QHS  zolpidem (AMBIEN) tablet 5 mg  5 mg Oral QHS PRN  
 acyclovir (ZOVIRAX) tablet 400 mg  400 mg Oral BID  influenza vaccine 2019-20 (6 mos+)(PF) (FLUARIX/FLULAVAL/FLUZONE QUAD) injection 0.5 mL  0.5 mL IntraMUSCular PRIOR TO DISCHARGE  diphenhydrAMINE (BENADRYL) capsule 50 mg  50 mg Oral QHS PRN  
 
 
 
ASSESSMENT: 
 
Problem List  Date Reviewed: 9/19/2019 Codes Class Noted * (Principal) Febrile neutropenia (HCC) ICD-10-CM: D70.9, R50.81 ICD-9-CM: 288.00, 780.61  9/21/2019 Pancytopenia due to antineoplastic chemotherapy [facility]) ICD-10-CM: D61.810, T45.1X5A 
ICD-9-CM: 284.11, E933.1  6/12/2019 Cellulitis of neck ICD-10-CM: K27.080 ICD-9-CM: 682.1  6/12/2019 Immunocompromised status associated with infection ([facility] ICD-10-CM: D84.9, B99.9 ICD-9-CM: 279.3, 136.9  6/12/2019 Port or reservoir infection ICD-10-CM: N31.929A ICD-9-CM: 999.33  6/12/2019 Acute myeloid leukemia not having achieved remission ([facility] ICD-10-CM: C92.00 ICD-9-CM: 205.00  5/9/2019 Admission for antineoplastic chemotherapy ICD-10-CM: Z51.11 ICD-9-CM: V58.11  5/5/2019 AML (acute myeloblastic leukemia) ([facility] ICD-10-CM: C92.00 ICD-9-CM: 205.00  4/28/2019 Weakness generalized ICD-10-CM: R53.1 ICD-9-CM: 780.79  4/28/2019 Pancytopenia ([facility] ICD-10-CM: D47.167 ICD-9-CM: 284.19  4/28/2019 Thrombocytopenia ([facility] ICD-10-CM: D69.6 ICD-9-CM: 287.5  4/27/2019 Ms. Jaden Bauer is a 68year old female who was admitted on 9/21/2019 for neutropenic fever. She has been having intermittent low grade fevers over the last week. She came to infusion yesterday for a blood transfusion, and after going home she had a fever of 102.5 and was sent to the ER for evaluation. She is a known patient of Dr. Jose Fortune with AML. Initially treated with induction 7+3 and Midostaurin. Most recently on Decitabine plus Gilteritinib with cycle 2 beginning on 8/26/2019 and cycle 3 due 9/23/2019. CXR negative. UA clear. BC/UC NTD. Started on zosyn and vanc. PLAN: 
AML 
-On Dacogen/Gilteritinib with most recent cycle 2 on 8/26/2019 with cycle 3 due 9/23/2019 9/23 Discuss BMbx flow results with patient-persistent AML-58% blasts. Still waiting on final pathology. 9/24 Bone marrow biopsy final path still pending.  
  
Neutropenic Fever -UA clear, BC/UC NTD 
-On Zosyn/Vanc empirically 9/23 Tmax 100.8. Feeling better today. Day 2 zosyn and vanc. BC/UC NTD 
9/24 Tmax 100.6. Day 3 vanc/zosyn. BC/UC still NGTD.  
9/25 Check aspergillus, fungitell, CMV. Add antifungal - vfend. CXR and BCx repeated yesterday and negative 9/26 BCx remain negative. Repeated this morning. Cdiff negative. D/c vancomycin. Continue zosyn, acyclovir, voriconazole. Consult ID for additional recommendations. Of note, gilteritinib can cause fevers in 13-35% of patients. Unclear timeline for febrile episodes? Will try to contact rep to discuss 9/27 Appreciate ID support. Changed to merrem. Given shortness of breath this morning, will proceed with CT CAP to eval for source. Could still be from drug vs disease as well 9/28 CT CAP negative for source of infection, PE. Did show trace pleural/pericardial effusion. Continue antibiotics. At this point, this may likely be drug or disease related fevers. Will hold gilteritinib and monitor fever curve Diarrhea 9/26 Cdiff negative. Add imodium/lomotil PRN Pancytopenia related to chemo/disease 9/23 hgb 6.5 transfuse per [facility] SOPs 
9/24 Hgb up to 7.4.  
9/28 PRBCs today Dyspnea 9/27 BNP elevated at 458. Check echo. DC fluids. Lasix x 1 
9/28 Repeat BNP tomorrow AM. Lasix after blood today Goals and plan of care reviewed with the patient. All questions answered to the best of our ability. MD Rodol[facility] [address] [address] Office : [phone] Fax : [phone]

## 2019-09-28 NOTE — PROGRESS NOTES
Patient not seen but chart evaluated - CT results. No obvious etiology of fever on imaging, trace pleural effusions and pericardial effusion. Has small renal cyst and non-obstructing calculus. Remains febrile, other VSS. ANC 0. Remains on broad therapy with yung, vori and would continue for now. Only other thought would be fungal etiology not covered by vori vs CRE? No evidence of these on imaging or cultures. Fever related to malignancy? No change in therapy for today.

## 2019-09-28 NOTE — PROGRESS NOTES
END OF SHIFT NOTE: 
 
Intake/Output 
09/27 1901 - 09/28 0700 In: 320 [P.O.:120; I.V.:200] Out: 2000 [[id number]:[id number]] Voiding: YES Catheter: NO 
Drain:   
 
 
 
 
 
Stool:  0 occurrences. Stool Assessment Stool Color: Green;Brown (09/25/19 2001) Stool Appearance: Loose (09/27/19 0731) Stool Amount: Small (09/25/19 2001) Stool Source/Status: Rectum (09/25/19 2001) Emesis:  0 occurrences. VITAL SIGNS Patient Vitals for the past 12 hrs: 
 Temp Pulse Resp BP SpO2  
09/28/19 0338 98.7 °F (37.1 °C) 78 18 111/57 90 % 09/27/19 2334 98.1 °F (36.7 °C) 83 16 107/68 94 % 09/27/19 2149 98.3 °F (36.8 °C)      
09/27/19 2022 (!) 102.3 °F (39.1 °C)      
09/27/19 1938 (!) 102.8 °F (39.3 °C) 89 18 154/61 96 % Pain Assessment Pain 1 Pain Scale 1: Visual (09/28/19 0240) Pain Intensity 1: 0 (09/28/19 0240) Patient Stated Pain Goal: 0 (09/27/19 1939) Pain Reassessment 1: Patient resting w/respiratory rate greater than 10 (09/28/19 0240) Pain Onset 1: 20 minutes (09/23/19 0334) Pain Location 1: Head (09/23/19 0334) Pain Orientation 1: Anterior (09/23/19 0334) Pain Description 1: Dull (09/23/19 0334) Pain Intervention(s) 1: Medication (see MAR) (09/23/19 0334) Ambulating Yes Additional Information: Fevers continue, Tmax 102.8. Shift report given to oncoming nurse at the bedside.  
 
Britton Avitia RN

## 2019-09-29 ENCOUNTER — APPOINTMENT (OUTPATIENT)
Dept: GENERAL RADIOLOGY | Age: 74
DRG: 809 | End: 2019-09-29
Attending: INTERNAL MEDICINE
Payer: MEDICARE

## 2019-09-29 LAB
ABO + RH BLD: NORMAL
ALBUMIN SERPL-MCNC: 2.3 G/DL (ref 3.2–4.6)
ALBUMIN/GLOB SERPL: 0.6 {RATIO} (ref 1.2–3.5)
ALP SERPL-CCNC: 78 U/L (ref 50–136)
ALT SERPL-CCNC: 34 U/L (ref 12–65)
ANION GAP SERPL CALC-SCNC: 7 MMOL/L (ref 7–16)
AST SERPL-CCNC: 36 U/L (ref 15–37)
BACTERIA SPEC CULT: NORMAL
BACTERIA SPEC CULT: NORMAL
BILIRUB SERPL-MCNC: 0.6 MG/DL (ref 0.2–1.1)
BLD PROD TYP BPU: NORMAL
BLOOD GROUP ANTIBODIES SERPL: NORMAL
BNP SERPL-MCNC: 84 PG/ML
BPU ID: NORMAL
BUN SERPL-MCNC: 15 MG/DL (ref 8–23)
CALCIUM SERPL-MCNC: 8.2 MG/DL (ref 8.3–10.4)
CHLORIDE SERPL-SCNC: 112 MMOL/L (ref 98–107)
CO2 SERPL-SCNC: 27 MMOL/L (ref 21–32)
CREAT SERPL-MCNC: 0.79 MG/DL (ref 0.6–1)
CROSSMATCH RESULT,%XM: NORMAL
DIFFERENTIAL METHOD BLD: ABNORMAL
ERYTHROCYTE [DISTWIDTH] IN BLOOD BY AUTOMATED COUNT: 14.2 % (ref 11.9–14.6)
GLOBULIN SER CALC-MCNC: 3.9 G/DL (ref 2.3–3.5)
GLUCOSE SERPL-MCNC: 110 MG/DL (ref 65–100)
HCT VFR BLD AUTO: 23.8 % (ref 35.8–46.3)
HGB BLD-MCNC: 8 G/DL (ref 11.7–15.4)
MCH RBC QN AUTO: 29.7 PG (ref 26.1–32.9)
MCHC RBC AUTO-ENTMCNC: 33.6 G/DL (ref 31.4–35)
MCV RBC AUTO: 88.5 FL (ref 79.6–97.8)
NRBC # BLD: 0 K/UL (ref 0–0.2)
PLATELET # BLD AUTO: 8 K/UL (ref 150–450)
PLATELET COMMENTS,PCOM: ABNORMAL
PMV BLD AUTO: ABNORMAL FL (ref 9.4–12.3)
POTASSIUM SERPL-SCNC: 3.6 MMOL/L (ref 3.5–5.1)
PROT SERPL-MCNC: 6.2 G/DL (ref 6.3–8.2)
RBC # BLD AUTO: 2.69 M/UL (ref 4.05–5.2)
RBC MORPH BLD: ABNORMAL
SERVICE CMNT-IMP: NORMAL
SERVICE CMNT-IMP: NORMAL
SODIUM SERPL-SCNC: 146 MMOL/L (ref 136–145)
SPECIMEN EXP DATE BLD: NORMAL
STATUS OF UNIT,%ST: NORMAL
UNIT DIVISION, %UDIV: 0
WBC # BLD AUTO: 0.4 K/UL (ref 4.3–11.1)
WBC MORPH BLD: ABNORMAL

## 2019-09-29 PROCEDURE — 71045 X-RAY EXAM CHEST 1 VIEW: CPT

## 2019-09-29 PROCEDURE — 74011250637 HC RX REV CODE- 250/637: Performed by: NURSE PRACTITIONER

## 2019-09-29 PROCEDURE — 80053 COMPREHEN METABOLIC PANEL: CPT

## 2019-09-29 PROCEDURE — 99232 SBSQ HOSP IP/OBS MODERATE 35: CPT | Performed by: INTERNAL MEDICINE

## 2019-09-29 PROCEDURE — 74011250636 HC RX REV CODE- 250/636: Performed by: INTERNAL MEDICINE

## 2019-09-29 PROCEDURE — P9037 PLATE PHERES LEUKOREDU IRRAD: HCPCS

## 2019-09-29 PROCEDURE — 36591 DRAW BLOOD OFF VENOUS DEVICE: CPT

## 2019-09-29 PROCEDURE — 86644 CMV ANTIBODY: CPT

## 2019-09-29 PROCEDURE — 85025 COMPLETE CBC W/AUTO DIFF WBC: CPT

## 2019-09-29 PROCEDURE — 36430 TRANSFUSION BLD/BLD COMPNT: CPT

## 2019-09-29 PROCEDURE — 74011250637 HC RX REV CODE- 250/637: Performed by: INTERNAL MEDICINE

## 2019-09-29 PROCEDURE — 74011000258 HC RX REV CODE- 258: Performed by: INTERNAL MEDICINE

## 2019-09-29 PROCEDURE — 83880 ASSAY OF NATRIURETIC PEPTIDE: CPT

## 2019-09-29 PROCEDURE — 65270000029 HC RM PRIVATE

## 2019-09-29 RX ORDER — DIPHENHYDRAMINE HCL 25 MG
25 CAPSULE ORAL
Status: DISCONTINUED | OUTPATIENT
Start: 2019-09-29 | End: 2019-10-10 | Stop reason: HOSPADM

## 2019-09-29 RX ORDER — SODIUM CHLORIDE 9 MG/ML
250 INJECTION, SOLUTION INTRAVENOUS AS NEEDED
Status: DISCONTINUED | OUTPATIENT
Start: 2019-09-29 | End: 2019-10-03 | Stop reason: SDUPTHER

## 2019-09-29 RX ORDER — FUROSEMIDE 10 MG/ML
20 INJECTION INTRAMUSCULAR; INTRAVENOUS ONCE
Status: COMPLETED | OUTPATIENT
Start: 2019-09-29 | End: 2019-09-29

## 2019-09-29 RX ADMIN — ACYCLOVIR 400 MG: 800 TABLET ORAL at 09:04

## 2019-09-29 RX ADMIN — DULOXETINE 30 MG: 30 CAPSULE, DELAYED RELEASE ORAL at 09:03

## 2019-09-29 RX ADMIN — MEROPENEM 500 MG: 500 INJECTION, POWDER, FOR SOLUTION INTRAVENOUS at 15:58

## 2019-09-29 RX ADMIN — PRAVASTATIN SODIUM 10 MG: 20 TABLET ORAL at 21:21

## 2019-09-29 RX ADMIN — POTASSIUM CHLORIDE 20 MEQ: 20 TABLET, EXTENDED RELEASE ORAL at 09:04

## 2019-09-29 RX ADMIN — ACETAMINOPHEN 650 MG: 325 TABLET, FILM COATED ORAL at 10:40

## 2019-09-29 RX ADMIN — VORICONAZOLE 200 MG: 200 TABLET, FILM COATED ORAL at 20:16

## 2019-09-29 RX ADMIN — MEROPENEM 500 MG: 500 INJECTION, POWDER, FOR SOLUTION INTRAVENOUS at 21:21

## 2019-09-29 RX ADMIN — LOPERAMIDE HYDROCHLORIDE 2 MG: 2 CAPSULE ORAL at 20:21

## 2019-09-29 RX ADMIN — MEROPENEM 500 MG: 500 INJECTION, POWDER, FOR SOLUTION INTRAVENOUS at 10:06

## 2019-09-29 RX ADMIN — FUROSEMIDE 20 MG: 10 INJECTION, SOLUTION INTRAMUSCULAR; INTRAVENOUS at 11:45

## 2019-09-29 RX ADMIN — LORAZEPAM 1 MG: 1 TABLET ORAL at 21:21

## 2019-09-29 RX ADMIN — VORICONAZOLE 200 MG: 200 TABLET, FILM COATED ORAL at 09:04

## 2019-09-29 RX ADMIN — DIPHENHYDRAMINE HYDROCHLORIDE 25 MG: 25 CAPSULE ORAL at 21:32

## 2019-09-29 RX ADMIN — MEROPENEM 500 MG: 500 INJECTION, POWDER, FOR SOLUTION INTRAVENOUS at 03:05

## 2019-09-29 RX ADMIN — DIPHENHYDRAMINE HYDROCHLORIDE 25 MG: 25 CAPSULE ORAL at 10:40

## 2019-09-29 RX ADMIN — POTASSIUM CHLORIDE 20 MEQ: 20 TABLET, EXTENDED RELEASE ORAL at 18:08

## 2019-09-29 RX ADMIN — ACYCLOVIR 400 MG: 800 TABLET ORAL at 18:08

## 2019-09-29 NOTE — PROGRESS NOTES
[facility] Hematology & Oncology Inpatient Hematology / Oncology Progress Note Admission Date: 2019  6:30 PM 
Reason for Admission/Hospital Course: Febrile neutropenia ([facility] [D70.9, R50.81] 24 Hour Events: 
Fevers persist, tmax 102.2 last PM, vitals stable Hgb up to 8.0 following transfusion BNP down to 78 but still dyspneic with exertion Echo still pending In good spirits today and feels better overall ROS: 
Constitutional: negative for fever, chills, weakness, malaise, fatigue. CV:  negative for chest pain, palpitations, edema. Respiratory:  negative for dyspnea, cough, wheezing. GI: + diarrhea - much improved. negative for nausea, abdominal pain. 10 point review of systems is otherwise negative with the exception of the elements mentioned above in the HPI. No Known Allergies OBJECTIVE: 
Patient Vitals for the past 8 hrs: 
 BP Temp Pulse Resp SpO2  
19 0807 144/61 98 °F (36.7 °C) 81 18 90 % 19 0309 120/55 97.4 °F (36.3 °C) 69 18 90 % Temp (24hrs), Av.7 °F (37.6 °C), Min:97.4 °F (36.3 °C), Max:102.8 °F (39.3 °C) 
 
 0701 -  1900 In: 240 [P.O.:240] Out: - Physical Exam: 
Constitutional: Well developed, well nourished female in no acute distress, sitting up on side of bed HEENT: Normocephalic and atraumatic. Oropharynx is clear, mucous membranes are moist.  Pupils are equal, round, and reactive to light. Extraocular muscles are intact. Sclerae anicteric. Patchy alopecia Skin Warm and dry. No bruising and no rash noted. No erythema. + pallor Respiratory Lungs with crackles to left base, normal air exchange without accessory muscle use. Increased work of breathing following activity CVS Normal rate, regular rhythm and normal S1 and S2. No murmurs, gallops, or rubs. Abdomen Soft, nontender and nondistended, normoactive bowel sounds. No palpable mass. No hepatosplenomegaly. Neuro Grossly nonfocal with no obvious sensory or motor deficits. MSK Normal range of motion in general. 1+ BLE edema and no tenderness. Psych Appropriate mood and affect. Labs: 
   
Recent Labs  
  09/29/19 0306 09/28/19 0343 09/27/19 0345 WBC 0.4* 0.5* 0.5* RBC 2.69* 2.37* 2.47* HGB 8.0* 7.0* 7.3* HCT 23.8* 21.0* 21.8* MCV 88.5 88.6 88.3 MCH 29.7 29.5 29.6 MCHC 33.6 33.3 33.5  
RDW 14.2 14.7* 14.4 PLT 8* 14* 22* GRANS  --   --  4* LYMPH  --   --  86* MONOS  --   --  6  
EOS  --   --  2  
BASOS  --   --  0 IG  --   --  2  
DF MANUAL AUTOMATED AUTOMATED ANEU  --   --  0.0* ABL  --   --  0.5 ABM  --   --  0.0* ALEKSANDR  --   --  0.0 ABB  --   --  0.0 AIG  --   --  0.0 Recent Labs  
  09/29/19 0306 09/28/19 0343 09/27/19 0345 * 145 144  
K 3.6 3.6 3.8 * 112* 113* CO2 27 27 24 AGAP 7 6* 7 * 88 98 BUN 15 10 6* CREA 0.79 0.74 0.75 GFRAA >60 >60 >60 GFRNA >60 >60 >60  
CA 8.2* 7.9* 7.8* SGOT 36 33 35 AP 78 79 71  
TP 6.2* 6.0* 6.0* ALB 2.3* 2.3* 2.4*  
GLOB 3.9* 3.7* 3.6* AGRAT 0.6* 0.6* 0.7* Imaging: XR CHEST PA LAT [[id number]] Collected: 09/24/19 1617 Order Status: Completed Updated: 09/24/19 1620 Narrative:    
CHEST X-RAY, 2 views 9/24/2019 History: Tachypnea and fever. Technique: PA and lateral views of the chest.  
 
Comparison: Chest x-ray 9/21/2019 Findings: A stable left-sided venous port is seen. The cardiac silhouette is mildly 
enlarged although stable.  The lungs are expanded without evidence for 
pneumothorax.  No evolving consolidation, or evidence of pleural effusion is 
seen. The bony thorax demonstrates no acute changes.  The upper abdomen is 
unremarkable in appearance. Impression:    
IMPRESSION:  
1.  Stable cardiomegaly without evolving acute changes evident by plain film 
imaging. XR CHEST PA LAT [[id number]] Collected: 09/21/19 2042 Order Status: Completed Updated: 09/21/19 2047 Narrative:    
EXAM: Chest x-ray. INDICATION: Febrile neutropenia. COMPARISON: May 18, 2019. TECHNIQUE: Frontal and lateral x-rays of the chest were obtained. FINDINGS: The lungs are clear except for minimal linear atelectasis or scarring 
in the lateral left costophrenic angle. The cardiac size, mediastinal contour 
and pulmonary vasculature are within normal limits. No pneumothorax or pleural 
effusion is seen. A left chest wall infusion port catheter remains in place. Impression:    
IMPRESSION: No acute process. Medications: 
Current Facility-Administered Medications Medication Dose Route Frequency  0.9% sodium chloride infusion 250 mL  250 mL IntraVENous PRN  
 diphenhydrAMINE (BENADRYL) capsule 25 mg  25 mg Oral Q6H PRN  
 furosemide (LASIX) injection 20 mg  20 mg IntraVENous ONCE  
 0.9% sodium chloride infusion 250 mL  250 mL IntraVENous PRN  
 loperamide (IMODIUM) capsule 2 mg  2 mg Oral Q4H PRN  
 diphenoxylate-atropine (LOMOTIL) tablet 2 Tab  2 Tab Oral QID PRN  
 meropenem (MERREM) 500 mg in 0.9% sodium chloride (MBP/ADV) 50 mL  0.5 g IntraVENous Q6H  
 0.9% sodium chloride infusion 250 mL  250 mL IntraVENous PRN  
 voriconazole (VFEND) tablet 200 mg  200 mg Oral Q12H  potassium chloride (K-DUR, KLOR-CON) SR tablet 20 mEq  20 mEq Oral BID  polyethylene glycol (MIRALAX) packet 17 g  17 g Oral DAILY PRN  
 acetaminophen (TYLENOL) tablet 650 mg  650 mg Oral Q6H PRN  
 [Held by provider] gilteritinib tab (Xospata) 120 mg = 3 tabs  (Patient Supplied)  120 mg Oral PCL  sodium chloride (NS) flush 5-10 mL  5-10 mL IntraVENous PRN  
 DULoxetine (CYMBALTA) capsule 30 mg  30 mg Oral DAILY  LORazepam (ATIVAN) tablet 1 mg  1 mg Oral QHS  pravastatin (PRAVACHOL) tablet 10 mg  10 mg Oral QHS  zolpidem (AMBIEN) tablet 5 mg  5 mg Oral QHS PRN  
 acyclovir (ZOVIRAX) tablet 400 mg  400 mg Oral BID  
  influenza vaccine 2019-20 (6 mos+)(PF) (FLUARIX/FLULAVAL/FLUZONE QUAD) injection 0.5 mL  0.5 mL IntraMUSCular PRIOR TO DISCHARGE  diphenhydrAMINE (BENADRYL) capsule 50 mg  50 mg Oral QHS PRN  
 
 
 
ASSESSMENT: 
 
Problem List  Date Reviewed: 9/19/2019 Codes Class Noted * (Principal) Febrile neutropenia (HCC) ICD-10-CM: D70.9, R50.81 ICD-9-CM: 288.00, 780.61  9/21/2019 Pancytopenia due to antineoplastic chemotherapy [facility]) ICD-10-CM: D61.810, T45.1X5A 
ICD-9-CM: 284.11, E933.1  6/12/2019 Cellulitis of neck ICD-10-CM: N91.071 ICD-9-CM: 682.1  6/12/2019 Immunocompromised status associated with infection ([facility] ICD-10-CM: D84.9, B99.9 ICD-9-CM: 279.3, 136.9  6/12/2019 Port or reservoir infection ICD-10-CM: U26.168M ICD-9-CM: 999.33  6/12/2019 Acute myeloid leukemia not having achieved remission ([facility] ICD-10-CM: C92.00 ICD-9-CM: 205.00  5/9/2019 Admission for antineoplastic chemotherapy ICD-10-CM: Z51.11 ICD-9-CM: V58.11  5/5/2019 AML (acute myeloblastic leukemia) ([facility] ICD-10-CM: C92.00 ICD-9-CM: 205.00  4/28/2019 Weakness generalized ICD-10-CM: R53.1 ICD-9-CM: 780.79  4/28/2019 Pancytopenia ([facility] ICD-10-CM: T62.179 ICD-9-CM: 284.19  4/28/2019 Thrombocytopenia ([facility] ICD-10-CM: D69.6 ICD-9-CM: 287.5  4/27/2019 Ms. John Steele is a 68year old female who was admitted on 9/21/2019 for neutropenic fever. She has been having intermittent low grade fevers over the last week. She came to infusion yesterday for a blood transfusion, and after going home she had a fever of 102.5 and was sent to the ER for evaluation. She is a known patient of Dr. Libby Pardo with AML. Initially treated with induction 7+3 and Midostaurin. Most recently on Decitabine plus Gilteritinib with cycle 2 beginning on 8/26/2019 and cycle 3 due 9/23/2019. CXR negative. UA clear. BC/UC NTD. Started on zosyn and vanc. PLAN: 
AML -On Dacogen/Gilteritinib with most recent cycle 2 on 8/26/2019 with cycle 3 due 9/23/2019 9/23 Discuss BMbx flow results with patient-persistent AML-58% blasts. Still waiting on final pathology. 9/24 Bone marrow biopsy final path still pending.  
  
Neutropenic Fever 
-UA clear, BC/UC NTD 
-On Zosyn/Vanc empirically 9/23 Tmax 100.8. Feeling better today. Day 2 zosyn and vanc. BC/UC NTD 
9/24 Tmax 100.6. Day 3 vanc/zosyn. BC/UC still NGTD.  
9/25 Check aspergillus, fungitell, CMV. Add antifungal - vfend. CXR and BCx repeated yesterday and negative 9/26 BCx remain negative. Repeated this morning. Cdiff negative. D/c vancomycin. Continue zosyn, acyclovir, voriconazole. Consult ID for additional recommendations. Of note, gilteritinib can cause fevers in 13-35% of patients. Unclear timeline for febrile episodes? Will try to contact rep to discuss 9/27 Appreciate ID support. Changed to merrem. Given shortness of breath this morning, will proceed with CT CAP to eval for source. Could still be from drug vs disease as well 9/28 CT CAP negative for source of infection, PE. Did show trace pleural/pericardial effusion. Continue antibiotics. At this point, this may likely be drug or disease related fevers. Will hold gilteritinib and monitor fever curve 9/29 Gilteritinib remains on hold secondary to fevers Diarrhea 9/26 Cdiff negative. Add imodium/lomotil PRN Pancytopenia related to chemo/disease 9/23 hgb 6.5 transfuse per [facility] SOPs 
9/24 Hgb up to 7.4.  
9/28 PRBCs today 9/29 Plt today, lasix following transfusion Dyspnea 9/27 BNP elevated at 458. Check echo. DC fluids. Lasix x 1 
9/28 Repeat BNP tomorrow AM. Lasix after blood today 9/29 Dyspneic with exertion. Repeat lasix after platelets. Echo still pending. Repeat CXR. Noted crackles to left base Goals and plan of care reviewed with the patient. All questions answered to the best of our ability.  
 
  
 
  
Timmy Faulkner MD 
 [facility] [place] [facility] [address] [address] Office : [phone] Fax : [phone]

## 2019-09-29 NOTE — PROGRESS NOTES
END OF SHIFT NOTE: 
 
Intake/Output 
09/28 1901 - 09/29 0700 In: 475 [P.O.:475] Out: 800 [Urine:800] Voiding: YES Catheter: NO 
Drain:   
 
 
 
 
 
Stool:  2 occurrences. Stool Assessment Stool Color: Brown (09/28/19 0930) Stool Appearance: Loose (09/28/19 0930) Stool Amount: Small (09/28/19 0930) Stool Source/Status: Rectum (09/28/19 0930) Emesis:  0 occurrences. VITAL SIGNS Patient Vitals for the past 12 hrs: 
 Temp Pulse Resp BP SpO2  
09/28/19 1958 (!) 102 °F (38.9 °C)      
09/28/19 1918 (!) 102.2 °F (39 °C) 94 18 151/69 95 % 09/28/19 1708 98.4 °F (36.9 °C)      
09/28/19 1541 99.2 °F (37.3 °C) 85 18 128/56 90 % 09/28/19 1322 98 °F (36.7 °C)      
09/28/19 1222 (!) 102.8 °F (39.3 °C)      
09/28/19 1114 (!) 100.8 °F (38.2 °C) 93 20 126/55 91 % Pain Assessment Pain 1 Pain Scale 1: Numeric (0 - 10) (09/28/19 1529) Pain Intensity 1: 0 (09/28/19 1529) Patient Stated Pain Goal: 0 (09/28/19 1529) Pain Reassessment 1: Patient resting w/respiratory rate greater than 10 (09/28/19 0240) Pain Onset 1: 20 minutes (09/23/19 0334) Pain Location 1: Head (09/23/19 0334) Pain Orientation 1: Anterior (09/23/19 0334) Pain Description 1: Dull (09/23/19 0334) Pain Intervention(s) 1: Medication (see MAR) (09/23/19 0334) Ambulating Yes Additional Information:  
-Febrile all day 
-Holding chemo 
-1 unit of PRBCs ordered -BNP in the AM, Lasix IV given 
-port needle and dressing changed Shift report given to oncoming nurse at the bedside.  
 
Serenity Novak RN

## 2019-09-29 NOTE — PROGRESS NOTES
END OF SHIFT NOTE: 
 
Intake/Output 
09/28 1901 - 09/29 0700 In: 2998 [P.O.:475; I.V.:513] Out: 900 [Urine:900] Voiding: YES Catheter: NO 
Drain:   
 
 
 
 
 
Stool:  0 occurrences. Stool Assessment Stool Color: Brown (09/28/19 0930) Stool Appearance: Loose (09/28/19 0930) Stool Amount: Small (09/28/19 0930) Stool Source/Status: Rectum (09/28/19 0930) Emesis:  0 occurrences. VITAL SIGNS Patient Vitals for the past 12 hrs: 
 Temp Pulse Resp BP SpO2  
09/29/19 0309 97.4 °F (36.3 °C) 69 18 120/55 90 % 09/28/19 2319 98 °F (36.7 °C) 77 18 118/52 92 %  
09/28/19 2219 99.6 °F (37.6 °C) 86 16 124/64 94 % 09/28/19 2150 98.4 °F (36.9 °C) 93 16 135/55 93 % 09/28/19 2106 (!) 101.1 °F (38.4 °C)      
09/28/19 1958 (!) 102 °F (38.9 °C)      
09/28/19 1918 (!) 102.2 °F (39 °C) 94 18 151/69 95 % Pain Assessment Pain 1 Pain Scale 1: Visual (09/29/19 0235) Pain Intensity 1: 0 (09/29/19 0235) Patient Stated Pain Goal: 0 (09/28/19 1937) Pain Reassessment 1: Patient resting w/respiratory rate greater than 10 (09/29/19 0235) Pain Onset 1: 20 minutes (09/23/19 0334) Pain Location 1: Head (09/23/19 0334) Pain Orientation 1: Anterior (09/23/19 0334) Pain Description 1: Dull (09/23/19 0334) Pain Intervention(s) 1: Medication (see MAR) (09/23/19 0334) Ambulating Yes Additional Information: Tmax 102.2. 1 unit of PRBC's transfused overnight. Plt's 8 this AM, 1 unit ordered. Shift report given to oncoming nurse at the bedside.  
 
Dave Mccarthy RN

## 2019-09-30 LAB
ALBUMIN SERPL-MCNC: 2.6 G/DL (ref 3.2–4.6)
ALBUMIN/GLOB SERPL: 0.7 {RATIO} (ref 1.2–3.5)
ALP SERPL-CCNC: 80 U/L (ref 50–136)
ALT SERPL-CCNC: 29 U/L (ref 12–65)
ANION GAP SERPL CALC-SCNC: 7 MMOL/L (ref 7–16)
AST SERPL-CCNC: 32 U/L (ref 15–37)
BILIRUB SERPL-MCNC: 0.5 MG/DL (ref 0.2–1.1)
BLD PROD TYP BPU: NORMAL
BLOOD BANK CMNT PATIENT-IMP: NORMAL
BPU ID: NORMAL
BUN SERPL-MCNC: 13 MG/DL (ref 8–23)
CALCIUM SERPL-MCNC: 8.2 MG/DL (ref 8.3–10.4)
CHLORIDE SERPL-SCNC: 110 MMOL/L (ref 98–107)
CO2 SERPL-SCNC: 28 MMOL/L (ref 21–32)
CREAT SERPL-MCNC: 0.76 MG/DL (ref 0.6–1)
DIFFERENTIAL METHOD BLD: ABNORMAL
ERYTHROCYTE [DISTWIDTH] IN BLOOD BY AUTOMATED COUNT: 14.6 % (ref 11.9–14.6)
GLOBULIN SER CALC-MCNC: 3.7 G/DL (ref 2.3–3.5)
GLUCOSE SERPL-MCNC: 89 MG/DL (ref 65–100)
HCT VFR BLD AUTO: 22.9 % (ref 35.8–46.3)
HGB BLD-MCNC: 7.7 G/DL (ref 11.7–15.4)
MCH RBC QN AUTO: 29.4 PG (ref 26.1–32.9)
MCHC RBC AUTO-ENTMCNC: 33.6 G/DL (ref 31.4–35)
MCV RBC AUTO: 87.4 FL (ref 79.6–97.8)
NRBC # BLD: 0 K/UL (ref 0–0.2)
PLATELET # BLD AUTO: 45 K/UL (ref 150–450)
PLATELET COMMENTS,PCOM: ABNORMAL
PMV BLD AUTO: 10.2 FL (ref 9.4–12.3)
POTASSIUM SERPL-SCNC: 3.8 MMOL/L (ref 3.5–5.1)
PROT SERPL-MCNC: 6.3 G/DL (ref 6.3–8.2)
RBC # BLD AUTO: 2.62 M/UL (ref 4.05–5.2)
RBC MORPH BLD: ABNORMAL
SODIUM SERPL-SCNC: 145 MMOL/L (ref 136–145)
STATUS OF UNIT,%ST: NORMAL
UNIT DIVISION, %UDIV: 0
WBC # BLD AUTO: 0.5 K/UL (ref 4.3–11.1)
WBC MORPH BLD: ABNORMAL

## 2019-09-30 PROCEDURE — 74011000258 HC RX REV CODE- 258: Performed by: INTERNAL MEDICINE

## 2019-09-30 PROCEDURE — 80053 COMPREHEN METABOLIC PANEL: CPT

## 2019-09-30 PROCEDURE — 74011250637 HC RX REV CODE- 250/637: Performed by: INTERNAL MEDICINE

## 2019-09-30 PROCEDURE — 85025 COMPLETE CBC W/AUTO DIFF WBC: CPT

## 2019-09-30 PROCEDURE — 99233 SBSQ HOSP IP/OBS HIGH 50: CPT | Performed by: INTERNAL MEDICINE

## 2019-09-30 PROCEDURE — 65270000029 HC RM PRIVATE

## 2019-09-30 PROCEDURE — 74011250636 HC RX REV CODE- 250/636: Performed by: NURSE PRACTITIONER

## 2019-09-30 PROCEDURE — 74011250636 HC RX REV CODE- 250/636: Performed by: INTERNAL MEDICINE

## 2019-09-30 PROCEDURE — 74011250637 HC RX REV CODE- 250/637: Performed by: NURSE PRACTITIONER

## 2019-09-30 PROCEDURE — 77030020256 HC SOL INJ NACL 0.9%  500ML

## 2019-09-30 PROCEDURE — 36591 DRAW BLOOD OFF VENOUS DEVICE: CPT

## 2019-09-30 RX ORDER — EPINEPHRINE 1 MG/ML
0.3 INJECTION, SOLUTION, CONCENTRATE INTRAVENOUS AS NEEDED
Status: CANCELLED | OUTPATIENT
Start: 2019-10-01

## 2019-09-30 RX ORDER — DIPHENHYDRAMINE HYDROCHLORIDE 50 MG/ML
50 INJECTION, SOLUTION INTRAMUSCULAR; INTRAVENOUS AS NEEDED
Status: CANCELLED
Start: 2019-10-01

## 2019-09-30 RX ORDER — ACETAMINOPHEN 325 MG/1
650 TABLET ORAL AS NEEDED
Status: CANCELLED
Start: 2019-10-01

## 2019-09-30 RX ORDER — ALBUTEROL SULFATE 0.83 MG/ML
2.5 SOLUTION RESPIRATORY (INHALATION) AS NEEDED
Status: CANCELLED
Start: 2019-10-01

## 2019-09-30 RX ORDER — ONDANSETRON 2 MG/ML
8 INJECTION INTRAMUSCULAR; INTRAVENOUS AS NEEDED
Status: CANCELLED | OUTPATIENT
Start: 2019-10-01

## 2019-09-30 RX ORDER — SODIUM CHLORIDE 0.9 % (FLUSH) 0.9 %
10 SYRINGE (ML) INJECTION AS NEEDED
Status: CANCELLED
Start: 2019-10-01

## 2019-09-30 RX ORDER — HEPARIN 100 UNIT/ML
300-500 SYRINGE INTRAVENOUS AS NEEDED
Status: CANCELLED
Start: 2019-10-01

## 2019-09-30 RX ORDER — FUROSEMIDE 10 MG/ML
20 INJECTION INTRAMUSCULAR; INTRAVENOUS ONCE
Status: COMPLETED | OUTPATIENT
Start: 2019-09-30 | End: 2019-09-30

## 2019-09-30 RX ORDER — HYDROCORTISONE SODIUM SUCCINATE 100 MG/2ML
100 INJECTION, POWDER, FOR SOLUTION INTRAMUSCULAR; INTRAVENOUS AS NEEDED
Status: CANCELLED | OUTPATIENT
Start: 2019-10-01

## 2019-09-30 RX ORDER — SODIUM CHLORIDE 9 MG/ML
10 INJECTION INTRAMUSCULAR; INTRAVENOUS; SUBCUTANEOUS AS NEEDED
Status: CANCELLED | OUTPATIENT
Start: 2019-10-01

## 2019-09-30 RX ADMIN — POTASSIUM CHLORIDE 20 MEQ: 20 TABLET, EXTENDED RELEASE ORAL at 16:19

## 2019-09-30 RX ADMIN — POTASSIUM CHLORIDE 20 MEQ: 20 TABLET, EXTENDED RELEASE ORAL at 08:19

## 2019-09-30 RX ADMIN — FUROSEMIDE 20 MG: 10 INJECTION, SOLUTION INTRAMUSCULAR; INTRAVENOUS at 16:20

## 2019-09-30 RX ADMIN — DULOXETINE 30 MG: 30 CAPSULE, DELAYED RELEASE ORAL at 08:19

## 2019-09-30 RX ADMIN — LOPERAMIDE HYDROCHLORIDE 2 MG: 2 CAPSULE ORAL at 08:24

## 2019-09-30 RX ADMIN — MEROPENEM 500 MG: 500 INJECTION, POWDER, FOR SOLUTION INTRAVENOUS at 03:59

## 2019-09-30 RX ADMIN — ACYCLOVIR 400 MG: 800 TABLET ORAL at 16:19

## 2019-09-30 RX ADMIN — PRAVASTATIN SODIUM 10 MG: 20 TABLET ORAL at 22:34

## 2019-09-30 RX ADMIN — ACYCLOVIR 400 MG: 800 TABLET ORAL at 08:19

## 2019-09-30 RX ADMIN — DIPHENHYDRAMINE HYDROCHLORIDE 25 MG: 25 CAPSULE ORAL at 22:50

## 2019-09-30 RX ADMIN — VORICONAZOLE 200 MG: 200 TABLET, FILM COATED ORAL at 08:19

## 2019-09-30 RX ADMIN — MEROPENEM 500 MG: 500 INJECTION, POWDER, FOR SOLUTION INTRAVENOUS at 16:20

## 2019-09-30 RX ADMIN — LORAZEPAM 1 MG: 1 TABLET ORAL at 22:33

## 2019-09-30 RX ADMIN — VORICONAZOLE 200 MG: 200 TABLET, FILM COATED ORAL at 22:33

## 2019-09-30 RX ADMIN — MEROPENEM 500 MG: 500 INJECTION, POWDER, FOR SOLUTION INTRAVENOUS at 22:34

## 2019-09-30 NOTE — PROGRESS NOTES
END OF SHIFT NOTE: 
 
Intake/Output 
09/29 1901 - 09/30 0700 In: 916 [P.O.:800; I.V.:116] Out: 1100 [Urine:1100] Voiding: YES Catheter: NO 
Drain:   
 
 
 
 
 
Stool:  0 occurrences. Stool Assessment Stool Color: Brown (09/28/19 0930) Stool Appearance: Loose (09/29/19 2016) Stool Amount: Small (09/28/19 0930) Stool Source/Status: Rectum (09/28/19 0930) Emesis:  0 occurrences. VITAL SIGNS Patient Vitals for the past 12 hrs: 
 Temp Pulse Resp BP SpO2  
09/30/19 0403 98.6 °F (37 °C) 89 18 151/72 94 % 09/30/19 0035 100.4 °F (38 °C)      
09/29/19 2238 100.2 °F (37.9 °C) 86 18 145/55 92 %  
09/29/19 2015 (!) 100.8 °F (38.2 °C)      
09/29/19 2008 100.4 °F (38 °C) 89 18 125/51 94 % Pain Assessment Pain 1 Pain Scale 1: Numeric (0 - 10) (09/30/19 0403) Pain Intensity 1: 0 (09/30/19 0403) Patient Stated Pain Goal: 0 (09/28/19 1937) Pain Reassessment 1: Patient resting w/respiratory rate greater than 10 (09/29/19 0235) Pain Onset 1: 20 minutes (09/23/19 0334) Pain Location 1: Head (09/23/19 0334) Pain Orientation 1: Anterior (09/23/19 0334) Pain Description 1: Dull (09/23/19 0334) Pain Intervention(s) 1: Medication (see MAR) (09/23/19 0334) Ambulating Yes Additional Information: pt had fever of 100.8 during shift. Pt is currently afebrile with no tylenol given during shift. VSS. Pt up to restroom many times during night. Pt resting quietly at this time. Shift report given to oncoming nurse at the bedside.  
 
Richard Kaye RN

## 2019-09-30 NOTE — PROGRESS NOTES
Chart reviewed by CM. No needs anticipated by CM at time of discharge at this time. CM will continue to follow. D/C plan: Home with no needs at this time

## 2019-09-30 NOTE — PROGRESS NOTES
Problem: Falls - Risk of 
Goal: *Absence of Falls Description Document [facility] Fall Risk and appropriate interventions in the flowsheet. Outcome: Progressing Towards Goal 
Note:  
Fall Risk Interventions: 
Mobility Interventions: Communicate number of staff needed for ambulation/transfer, Patient to call before getting OOB Medication Interventions: Teach patient to arise slowly Elimination Interventions: Call light in reach History of Falls Interventions: Investigate reason for fall Problem: Patient Education: Go to Patient Education Activity Goal: Patient/Family Education Outcome: Progressing Towards Goal 
  
Problem: Body Temperature -  Risk of, Imbalanced Goal: *Absence of heat stress or hyperthermia signs and symptoms Outcome: Progressing Towards Goal 
  
Problem: Patient Education: Go to Patient Education Activity Goal: Patient/Family Education Outcome: Progressing Towards Goal 
  
Problem: Anemia Care Plan (Adult and Pediatric) Goal: *Labs within defined limits Outcome: Progressing Towards Goal 
Goal: *Tolerates increased activity Outcome: Progressing Towards Goal 
  
Problem: Patient Education: Go to Patient Education Activity Goal: Patient/Family Education Outcome: Progressing Towards Goal 
  
Problem: Risk for Spread of Infection Goal: Prevent transmission of infectious organism to others Description Prevent the transmission of infectious organisms to other patients, staff members, and visitors. Outcome: Progressing Towards Goal 
  
Problem: Patient Education:  Go to Education Activity Goal: Patient/Family Education Outcome: Progressing Towards Goal 
  
Problem: Nutrition Deficit Goal: *Optimize nutritional status Outcome: Progressing Towards Goal

## 2019-09-30 NOTE — PROGRESS NOTES
END OF SHIFT NOTE: 
 
Intake/Output 
09/30 0701 - 09/30 1900 In: 80 [P.O.:780] Out: 550 [Urine:550] Voiding: YES Catheter: NO 
Drain:   
 
 
 
 
 
Stool:  3 occurrences. Stool Assessment Stool Color: Brown (09/28/19 0930) Stool Appearance: Loose (09/30/19 0819) Stool Amount: Small (09/28/19 0930) Stool Source/Status: Rectum (09/28/19 0930) Emesis:  0 occurrences. VITAL SIGNS Patient Vitals for the past 12 hrs: 
 Temp Pulse Resp BP SpO2  
09/30/19 1556 98.2 °F (36.8 °C) 80 18 151/74 95 % 09/30/19 1126 97.9 °F (36.6 °C) 83 18 136/69 95 % 09/30/19 0806 98.4 °F (36.9 °C) 84 18 116/58 95 % Pain Assessment Pain 1 Pain Scale 1: Numeric (0 - 10) (09/30/19 1439) Pain Intensity 1: 0 (09/30/19 1439) Patient Stated Pain Goal: 0 (09/30/19 1439) Pain Reassessment 1: Patient resting w/respiratory rate greater than 10 (09/29/19 0235) Pain Onset 1: 20 minutes (09/23/19 0334) Pain Location 1: Head (09/23/19 0334) Pain Orientation 1: Anterior (09/23/19 0334) Pain Description 1: Dull (09/23/19 0334) Pain Intervention(s) 1: Medication (see MAR) (09/23/19 0334) Ambulating Yes Additional Information: Pt resting on bed with family at bedside. 3 small episodes of diarrhea during shift. Pt states it has improved. No fevers this shift. PT will be inpatient for dacogen x 10 doses. No other needs at this time. Shift report given to oncoming nurse at the bedside. Kilo Ulloa

## 2019-09-30 NOTE — PROGRESS NOTES
[address] [facility] Hematology & Oncology Inpatient Hematology / Oncology Progress Note Admission Date: 2019  6:30 PM 
Reason for Admission/Hospital Course: Febrile neutropenia ([facility] [address] [D70.9, R50.81] 24 Hour Events: 
Fevers persist, tmax 100.8 last PM, vitals stable Still dyspneic with exertion Echo still pending In good spirits today and feels better overall Plan for Dacogen 10 days while inpatient ROS: 
Constitutional: negative for fever, chills, weakness, malaise, fatigue. CV:  negative for chest pain, palpitations, edema. Respiratory:  negative for dyspnea, cough, wheezing. GI: + diarrhea - much improved. negative for nausea, abdominal pain. 10 point review of systems is otherwise negative with the exception of the elements mentioned above in the HPI. No Known Allergies OBJECTIVE: 
Patient Vitals for the past 8 hrs: 
 BP Temp Pulse Resp SpO2  
19 1126 136/69 97.9 °F (36.6 °C) 83 18 95 % 19 0806 116/58 98.4 °F (36.9 °C) 84 18 95 % Temp (24hrs), Av.4 °F (37.4 °C), Min:97.9 °F (36.6 °C), Max:100.8 °F (38.2 °C) 
 
 0701 -  1900 In: 80 [P.O.:780] Out: 550 [Urine:550] Physical Exam: 
Constitutional: Well developed, well nourished female in no acute distress, sitting up on side of bed HEENT: Normocephalic and atraumatic. Oropharynx is clear, mucous membranes are moist.  Pupils are equal, round, and reactive to light. Extraocular muscles are intact. Sclerae anicteric. Patchy alopecia Skin Warm and dry. No bruising and no rash noted. No erythema. + pallor Respiratory Lungs with crackles to left base, normal air exchange without accessory muscle use. Increased work of breathing following activity CVS Normal rate, regular rhythm and normal S1 and S2. No murmurs, gallops, or rubs. Abdomen Soft, nontender and nondistended, normoactive bowel sounds. No palpable mass. No hepatosplenomegaly. Neuro Grossly nonfocal with no obvious sensory or motor deficits. MSK Normal range of motion in general. 1+ BLE edema and no tenderness. Psych Appropriate mood and affect. Labs: 
   
Recent Labs  
  09/30/19 0356 09/29/19 
0306 09/28/19 
8103 WBC 0.5* 0.4* 0.5* RBC 2.62* 2.69* 2.37* HGB 7.7* 8.0* 7.0*  
HCT 22.9* 23.8* 21.0*  
MCV 87.4 88.5 88.6 MCH 29.4 29.7 29.5 MCHC 33.6 33.6 33.3 RDW 14.6 14.2 14.7* PLT 45* 8* 14* DF MANUAL MANUAL AUTOMATED Recent Labs  
  09/30/19 0356 09/29/19 
0306 09/28/19 
0343  146* 145  
K 3.8 3.6 3.6 * 112* 112* CO2 28 27 27 AGAP 7 7 6*  
GLU 89 110* 88 BUN 13 15 10 CREA 0.76 0.79 0.74 GFRAA >60 >60 >60 GFRNA >60 >60 >60  
CA 8.2* 8.2* 7.9*  
SGOT 32 36 33 AP 80 78 79  
TP 6.3 6.2* 6.0* ALB 2.6* 2.3* 2.3*  
GLOB 3.7* 3.9* 3.7* AGRAT 0.7* 0.6* 0.6* Imaging: XR CHEST PA LAT [[id number]] Collected: 09/24/19 1617 Order Status: Completed Updated: 09/24/19 1620 Narrative:    
CHEST X-RAY, 2 views 9/24/2019 History: Tachypnea and fever. Technique: PA and lateral views of the chest.  
 
Comparison: Chest x-ray 9/21/2019 Findings: A stable left-sided venous port is seen. The cardiac silhouette is mildly 
enlarged although stable.  The lungs are expanded without evidence for 
pneumothorax.  No evolving consolidation, or evidence of pleural effusion is 
seen. The bony thorax demonstrates no acute changes.  The upper abdomen is 
unremarkable in appearance. Impression:    
IMPRESSION:  
1.  Stable cardiomegaly without evolving acute changes evident by plain film 
imaging. XR CHEST PA LAT [[id number]] Collected: 09/21/19 2042 Order Status: Completed Updated: 09/21/19 2047 Narrative:    
EXAM: Chest x-ray. INDICATION: Febrile neutropenia. COMPARISON: May 18, 2019. TECHNIQUE: Frontal and lateral x-rays of the chest were obtained. FINDINGS: The lungs are clear except for minimal linear atelectasis or scarring 
in the lateral left costophrenic angle. The cardiac size, mediastinal contour 
and pulmonary vasculature are within normal limits. No pneumothorax or pleural 
effusion is seen. A left chest wall infusion port catheter remains in place. Impression:    
IMPRESSION: No acute process. Medications: 
Current Facility-Administered Medications Medication Dose Route Frequency  furosemide (LASIX) injection 20 mg  20 mg IntraVENous ONCE  
 0.9% sodium chloride infusion 250 mL  250 mL IntraVENous PRN  
 diphenhydrAMINE (BENADRYL) capsule 25 mg  25 mg Oral Q6H PRN  
 loperamide (IMODIUM) capsule 2 mg  2 mg Oral Q4H PRN  
 diphenoxylate-atropine (LOMOTIL) tablet 2 Tab  2 Tab Oral QID PRN  
 meropenem (MERREM) 500 mg in 0.9% sodium chloride (MBP/ADV) 50 mL  0.5 g IntraVENous Q6H  
 voriconazole (VFEND) tablet 200 mg  200 mg Oral Q12H  potassium chloride (K-DUR, KLOR-CON) SR tablet 20 mEq  20 mEq Oral BID  polyethylene glycol (MIRALAX) packet 17 g  17 g Oral DAILY PRN  
 acetaminophen (TYLENOL) tablet 650 mg  650 mg Oral Q6H PRN  
 [Held by provider] gilteritinib tab (Xospata) 120 mg = 3 tabs  (Patient Supplied)  120 mg Oral PCL  sodium chloride (NS) flush 5-10 mL  5-10 mL IntraVENous PRN  
 DULoxetine (CYMBALTA) capsule 30 mg  30 mg Oral DAILY  LORazepam (ATIVAN) tablet 1 mg  1 mg Oral QHS  pravastatin (PRAVACHOL) tablet 10 mg  10 mg Oral QHS  zolpidem (AMBIEN) tablet 5 mg  5 mg Oral QHS PRN  
 acyclovir (ZOVIRAX) tablet 400 mg  400 mg Oral BID  influenza vaccine 2019-20 (6 mos+)(PF) (FLUARIX/FLULAVAL/FLUZONE QUAD) injection 0.5 mL  0.5 mL IntraMUSCular PRIOR TO DISCHARGE ASSESSMENT: 
 
Problem List  Date Reviewed: 9/19/2019 Codes Class Noted * (Principal) Febrile neutropenia (HCC) ICD-10-CM: D70.9, R50.81 ICD-9-CM: 288.00, 780.61  9/21/2019 Pancytopenia due to antineoplastic chemotherapy [facility]) ICD-10-CM: D61.810, T45.1X5A 
ICD-9-CM: 284.11, E933.1  6/12/2019 Cellulitis of neck ICD-10-CM: G23.460 ICD-9-CM: 682.1  6/12/2019 Immunocompromised status associated with infection ([facility] ICD-10-CM: D84.9, B99.9 ICD-9-CM: 279.3, 136.9  6/12/2019 Port or reservoir infection ICD-10-CM: Y61.963V ICD-9-CM: 999.33  6/12/2019 Acute myeloid leukemia not having achieved remission ([facility] ICD-10-CM: C92.00 ICD-9-CM: 205.00  5/9/2019 Admission for antineoplastic chemotherapy ICD-10-CM: Z51.11 ICD-9-CM: V58.11  5/5/2019 AML (acute myeloblastic leukemia) ([facility] ICD-10-CM: C92.00 ICD-9-CM: 205.00  4/28/2019 Weakness generalized ICD-10-CM: R53.1 ICD-9-CM: 780.79  4/28/2019 Pancytopenia ([facility] ICD-10-CM: K77.459 ICD-9-CM: 284.19  4/28/2019 Thrombocytopenia ([facility] ICD-10-CM: D69.6 ICD-9-CM: 287.5  4/27/2019 Ms. Moody Waldrop is a 68year old female who was admitted on 9/21/2019 for neutropenic fever. She has been having intermittent low grade fevers over the last week. She came to infusion yesterday for a blood transfusion, and after going home she had a fever of 102.5 and was sent to the ER for evaluation. She is a known patient of Dr. John Lopez with AML. Initially treated with induction 7+3 and Midostaurin. Most recently on Decitabine plus Gilteritinib with cycle 2 beginning on 8/26/2019 and cycle 3 due 9/23/2019. CXR negative. UA clear. BC/UC NTD. Started on zosyn and vanc. PLAN: 
AML 
-On Dacogen/Gilteritinib with most recent cycle 2 on 8/26/2019 with cycle 3 due 9/23/2019 9/23 Discuss BMbx flow results with patient-persistent AML-58% blasts. Still waiting on final pathology. 9/24 Bone marrow biopsy final path still pending. 9/30 Plan to restart Dacogen while inpatient for 10 days.   
  
Neutropenic Fever 
-UA clear, BC/UC NTD 
-On Zosyn/Vanc empirically 9/23 Tmax 100.8. Feeling better today. Day 2 zosyn and vanc. BC/UC NTD 
9/24 Tmax 100.6. Day 3 vanc/zosyn. BC/UC still NGTD.  
9/25 Check aspergillus, fungitell, CMV. Add antifungal - vfend. CXR and BCx repeated yesterday and negative 9/26 BCx remain negative. Repeated this morning. Cdiff negative. D/c vancomycin. Continue zosyn, acyclovir, voriconazole. Consult ID for additional recommendations. Of note, gilteritinib can cause fevers in 13-35% of patients. Unclear timeline for febrile episodes? Will try to contact rep to discuss 9/27 Appreciate ID support. Changed to merrem. Given shortness of breath this morning, will proceed with CT CAP to eval for source. Could still be from drug vs disease as well 9/28 CT CAP negative for source of infection, PE. Did show trace pleural/pericardial effusion. Continue antibiotics. At this point, this may likely be drug or disease related fevers. Will hold gilteritinib and monitor fever curve 9/29 Gilteritinib remains on hold secondary to fevers 9/30 .8 overnight. Discussed with ID and okay to restart Dacogen, thorough workup and fevers improved overall. Diarrhea 9/26 Cdiff negative. Add imodium/lomotil PRN Pancytopenia related to chemo/disease 9/23 hgb 6.5 transfuse per [facility] SOPs 
9/24 Hgb up to 7.4.  
9/28 PRBCs today 9/29 Plt today, lasix following transfusion 9/30 WBC 0.5, Hgb 7.7, Plt 45 k no transfusions today. Dyspnea 9/27 BNP elevated at 458. Check echo. DC fluids. Lasix x 1 
9/28 Repeat BNP tomorrow AM. Lasix after blood today 9/29 Dyspneic with exertion. Repeat lasix after platelets. Echo still pending. Repeat CXR. Noted crackles to left base Goals and plan of care reviewed with the patient. All questions answered to the best of our ability. Disposition:  Plan to restart Dacogen while inpatient. Also restart Gilteritinib on D5 of Dacogen. Will remain inpatient for 10 days of Dacogen.      
 
  
 
  
Rosana Escalante NP 
 [facility] Hematology and Oncology [address] [facility] [address] Office : [phone] Fax : [phone] Attending Addendum: 
I have personally performed a face to face diagnostic evaluation on this patient. I have reviewed and agree with the care plan as documented above by Amparo Kumari N.P.  My findings are as follows: Patient appears stable, heart rate regular without murmurs, abdomen is non-tender, bowel sounds are positive. 68 female, well-known to me for her history of AML, FLT3 ITD positive, failed induction with 7+3 with midostaurin, most recently on Dacogen plus Gilteritinib. Now admitted with neutropenic fevers, no clear infectious etiology. ID on board. Fevers felt to be likely leukemia related versus medications. Her Gilteritinib has now been on hold. Continues with fevers. Recent BM biopsy with persistent residual AML: Discussed options, will proceed with Dacogen x10 days. Will reintroduce Gilteritinib a few days. Continue regular labs with transfusions as needed. Recent CT CAP without any clear source of infection. Continue broad-spectrum antibiotics. Total time 35 min 50% in direct consultation about the patient's diagnosis and management Santa Sanchez MD 
[facility] Hematology/Oncology [id number] [address] Office : [phone] Fax : [phone]

## 2019-09-30 NOTE — PROGRESS NOTES
Infectious Disease Progress Note Today's Date: 2019 Admit Date: 2019 Impression: · Neutropenic fever · No localzing symptoms, unclear etiology- drug reaction vs AML vs infection. CT negative. BC/Ucx negative to date. Mild diarrhea: C.diff negative. CMV, Fungitell, Galactomannan: negative · AML- dx 2019 - thus far not responding to treatment 58% blasts Plan:  
· Continue Merrem · Continue ACV, Vfend · Heme/onc planning to start Dacogen, follow Anti-infectives: · Vanc - · Zosyn - · merrem  - current · Voriconazole - current · Acyclovir prophy Subjective:  
Resting in bed with significant other at bedside. No fever, chills, sweats today. Diarrhea: 2-3/day, unchanged and pt reports she sometimes has diarrhea. No rash/itching/dysuria/congestion. No Known Allergies Review of Systems:  A comprehensive review of systems was negative except for that written in the History of Present Illness. Objective:  
 
Visit Vitals /69 (BP 1 Location: Right arm, BP Patient Position: Sitting) Pulse 83 Temp 97.9 °F (36.6 °C) Resp 18 Ht 5' 6\" (1.676 m) Wt 90.2 kg (198 lb 12.8 oz) SpO2 95% Breastfeeding? No  
BMI 32.09 kg/m² Temp (24hrs), Av.4 °F (37.4 °C), Min:97.9 °F (36.6 °C), Max:100.8 °F (38.2 °C) Lines:  L chest port c/d/i, +bruising Physical Exam:   
General:  Alert, cooperative, well noursished, well developed, appears stated age Eyes:  Sclera anicteric. Pupils equally round and reactive to light. Mouth/Throat: Mucous membranes normal, oral pharynx clear Neck: Supple Lungs:   Clear to auscultation bilaterally, good effort CV:  Regular rate and rhythm,no murmur, click, rub or gallop Abdomen:   Soft, non-tender. bowel sounds normal. non-distended Extremities: No cyanosis or edema Skin: Skin color, texture, turgor normal. no acute rash or lesions Musculoskeletal: No swelling or deformity Lines/Devices:  Intact, no erythema, drainage or tenderness Psych: Alert and oriented, normal mood affect given the setting Data Review: CBC: 
Recent Labs  
  09/30/19 0356 09/29/19 0306 09/28/19 0343 WBC 0.5* 0.4* 0.5* HGB 7.7* 8.0* 7.0*  
HCT 22.9* 23.8* 21.0*  
PLT 45* 8* 14* BMP: 
Recent Labs  
  09/30/19 0356 09/29/19 0306 09/28/19 0343 CREA 0.76 0.79 0.74 BUN 13 15 10  146* 145  
K 3.8 3.6 3.6 * 112* 112* CO2 28 27 27 AGAP 7 7 6*  
GLU 89 110* 88 LFTS: 
Recent Labs  
  09/30/19 0356 09/29/19 0306 09/28/19 0343 TBILI 0.5 0.6 0.4 ALT 29 34 35 SGOT 32 36 33 AP 80 78 79  
TP 6.3 6.2* 6.0* ALB 2.6* 2.3* 2.3* Microbiology:  
 
All Micro Results Procedure Component Value Units Date/Time CULTURE, BLOOD [[id number]] Collected:  09/26/19 0753 Order Status:  Completed Specimen:  Blood Updated:  09/30/19 4863 Special Requests: SINGLE PORT Culture result: NO GROWTH 4 DAYS     
 CULTURE, BLOOD [[id number]] Collected:  09/26/19 1050 Order Status:  Completed Specimen:  Blood Updated:  09/30/19 1619 Special Requests: --     
  LEFT 
FOREARM Culture result: NO GROWTH 4 DAYS     
 CULTURE, BLOOD [[id number]] Collected:  09/24/19 1536 Order Status:  Completed Specimen:  Blood Updated:  09/29/19 1107 Special Requests: LATERAL PORT Culture result: NO GROWTH 5 DAYS     
 CULTURE, BLOOD [[id number]] Collected:  09/24/19 1530 Order Status:  Completed Specimen:  Blood Updated:  09/29/19 1107 Special Requests: --     
  LEFT 
HAND Culture result: NO GROWTH 5 DAYS     
 CMV BY PCR, QT [[id number]] Collected:  09/25/19 2016 Order Status:  Completed Specimen:  Blood from Plasma Updated:  09/28/19 2035 CMV IU/mL NEGATIVE  IU/mL Comment: (NOTE) No CMV DNA detected. The quantitative range of this assay is 200 to 1 million IU/mL. This test was developed and its performance characteristics determined by [facility]. It has not been cleared or approved by the 
Food and Drug Administration. The FDA has determined that such 
clearance or approval is not necessary. Performed At: [address] [id number] Kofi West MD [id number] CMV log 10 IU/mL TEST NOT PERFORMED log10 IU/mL Comment: (NOTE) Unable to calculate result since non-numeric result obtained for 
component test. 
  
  
 C. DIFFICILE AG & TOXIN A/B [[id number]] Collected:  09/25/19 1703 Order Status:  Completed Specimen:  Stool Updated:  09/26/19 1203 [id number] [place] ANTIGEN    
  C. DIFFICILE GDH ANTIGEN-NEGATIVE  
     
  C. difficile toxin C. DIFFICILE TOXIN-NEGATIVE  
     
  PCR Reflex NOT APPLICABLE INTERPRETATION    
  NEGATIVE FOR TOXIGENIC C. DIFFICILE Clinical Consideration NEGATIVE FOR TOXIGENIC C. DIFFICILE  
     
 CULTURE, BLOOD [[id number]] Collected:  09/21/19 1903 Order Status:  Completed Specimen:  Blood Updated:  09/26/19 4147 Special Requests: --     
  LEFT Antecubital 
  
  Culture result: NO GROWTH 5 DAYS     
 CULTURE, BLOOD [[id number]] Collected:  09/21/19 1850 Order Status:  Completed Specimen:  Blood Updated:  09/26/19 3570 Special Requests: --     
  LEFT 
PORT Culture result: NO GROWTH 5 DAYS     
 CULTURE, URINE [[id number]] Collected:  09/21/19 2039 Order Status:  Completed Specimen:  Urine from Clean catch Updated:  09/24/19 0700 Special Requests: NO SPECIAL REQUESTS Culture result:    
  <10,000 COLONIES/mL MIXED SKIN REGGIE ISOLATED Imaging: CXR 9/24 - no PNA Signed By: Francisco Lantigua NP September 30, 2019

## 2019-10-01 LAB
ALBUMIN SERPL-MCNC: 2.3 G/DL (ref 3.2–4.6)
ALBUMIN/GLOB SERPL: 0.6 {RATIO} (ref 1.2–3.5)
ALP SERPL-CCNC: 85 U/L (ref 50–136)
ALT SERPL-CCNC: 28 U/L (ref 12–65)
ANION GAP SERPL CALC-SCNC: 3 MMOL/L (ref 7–16)
AST SERPL-CCNC: 28 U/L (ref 15–37)
BACTERIA SPEC CULT: NORMAL
BACTERIA SPEC CULT: NORMAL
BASOPHILS # BLD: 0 K/UL (ref 0–0.2)
BASOPHILS NFR BLD: 0 % (ref 0–2)
BILIRUB SERPL-MCNC: 0.5 MG/DL (ref 0.2–1.1)
BUN SERPL-MCNC: 16 MG/DL (ref 8–23)
CALCIUM SERPL-MCNC: 8.4 MG/DL (ref 8.3–10.4)
CHLORIDE SERPL-SCNC: 109 MMOL/L (ref 98–107)
CO2 SERPL-SCNC: 30 MMOL/L (ref 21–32)
CREAT SERPL-MCNC: 0.8 MG/DL (ref 0.6–1)
DIFFERENTIAL METHOD BLD: ABNORMAL
EOSINOPHIL # BLD: 0 K/UL (ref 0–0.8)
EOSINOPHIL NFR BLD: 0 % (ref 0.5–7.8)
ERYTHROCYTE [DISTWIDTH] IN BLOOD BY AUTOMATED COUNT: 14 % (ref 11.9–14.6)
GLOBULIN SER CALC-MCNC: 3.8 G/DL (ref 2.3–3.5)
GLUCOSE SERPL-MCNC: 91 MG/DL (ref 65–100)
HCT VFR BLD AUTO: 22.2 % (ref 35.8–46.3)
HGB BLD-MCNC: 7.4 G/DL (ref 11.7–15.4)
IMM GRANULOCYTES # BLD AUTO: 0 K/UL (ref 0–0.5)
IMM GRANULOCYTES NFR BLD AUTO: 0 % (ref 0–5)
LYMPHOCYTES # BLD: 0.5 K/UL (ref 0.5–4.6)
LYMPHOCYTES NFR BLD: 89 % (ref 13–44)
MCH RBC QN AUTO: 29.5 PG (ref 26.1–32.9)
MCHC RBC AUTO-ENTMCNC: 33.3 G/DL (ref 31.4–35)
MCV RBC AUTO: 88.4 FL (ref 79.6–97.8)
MONOCYTES # BLD: 0 K/UL (ref 0.1–1.3)
MONOCYTES NFR BLD: 4 % (ref 4–12)
NEUTS SEG # BLD: 0 K/UL (ref 1.7–8.2)
NEUTS SEG NFR BLD: 7 % (ref 43–78)
NRBC # BLD: 0 K/UL (ref 0–0.2)
PLATELET # BLD AUTO: 25 K/UL (ref 150–450)
PLATELET COMMENTS,PCOM: ABNORMAL
PMV BLD AUTO: 9 FL (ref 9.4–12.3)
POTASSIUM SERPL-SCNC: 3.9 MMOL/L (ref 3.5–5.1)
PROT SERPL-MCNC: 6.1 G/DL (ref 6.3–8.2)
RBC # BLD AUTO: 2.51 M/UL (ref 4.05–5.2)
RBC MORPH BLD: ABNORMAL
SERVICE CMNT-IMP: NORMAL
SERVICE CMNT-IMP: NORMAL
SODIUM SERPL-SCNC: 142 MMOL/L (ref 136–145)
WBC # BLD AUTO: 0.5 K/UL (ref 4.3–11.1)
WBC MORPH BLD: ABNORMAL

## 2019-10-01 PROCEDURE — 74011250636 HC RX REV CODE- 250/636: Performed by: INTERNAL MEDICINE

## 2019-10-01 PROCEDURE — 74011250637 HC RX REV CODE- 250/637: Performed by: INTERNAL MEDICINE

## 2019-10-01 PROCEDURE — 85025 COMPLETE CBC W/AUTO DIFF WBC: CPT

## 2019-10-01 PROCEDURE — 74011000258 HC RX REV CODE- 258: Performed by: INTERNAL MEDICINE

## 2019-10-01 PROCEDURE — 74011250636 HC RX REV CODE- 250/636: Performed by: NURSE PRACTITIONER

## 2019-10-01 PROCEDURE — 99233 SBSQ HOSP IP/OBS HIGH 50: CPT | Performed by: INTERNAL MEDICINE

## 2019-10-01 PROCEDURE — 65270000029 HC RM PRIVATE

## 2019-10-01 PROCEDURE — 74011250637 HC RX REV CODE- 250/637: Performed by: NURSE PRACTITIONER

## 2019-10-01 PROCEDURE — 3E04305 INTRODUCTION OF OTHER ANTINEOPLASTIC INTO CENTRAL VEIN, PERCUTANEOUS APPROACH: ICD-10-PCS | Performed by: NURSE PRACTITIONER

## 2019-10-01 PROCEDURE — 77030020256 HC SOL INJ NACL 0.9%  500ML

## 2019-10-01 PROCEDURE — 80053 COMPREHEN METABOLIC PANEL: CPT

## 2019-10-01 PROCEDURE — 74011000258 HC RX REV CODE- 258: Performed by: NURSE PRACTITIONER

## 2019-10-01 RX ORDER — SODIUM CHLORIDE 9 MG/ML
25 INJECTION, SOLUTION INTRAVENOUS CONTINUOUS
Status: DISPENSED | OUTPATIENT
Start: 2019-10-01 | End: 2019-10-02

## 2019-10-01 RX ORDER — FUROSEMIDE 10 MG/ML
20 INJECTION INTRAMUSCULAR; INTRAVENOUS ONCE
Status: COMPLETED | OUTPATIENT
Start: 2019-10-01 | End: 2019-10-01

## 2019-10-01 RX ORDER — ONDANSETRON 2 MG/ML
8 INJECTION INTRAMUSCULAR; INTRAVENOUS EVERY 24 HOURS
Status: COMPLETED | OUTPATIENT
Start: 2019-10-01 | End: 2019-10-10

## 2019-10-01 RX ADMIN — ACYCLOVIR 400 MG: 800 TABLET ORAL at 08:23

## 2019-10-01 RX ADMIN — MEROPENEM 500 MG: 500 INJECTION, POWDER, FOR SOLUTION INTRAVENOUS at 06:06

## 2019-10-01 RX ADMIN — LORAZEPAM 1 MG: 1 TABLET ORAL at 21:24

## 2019-10-01 RX ADMIN — POTASSIUM CHLORIDE 20 MEQ: 20 TABLET, EXTENDED RELEASE ORAL at 18:05

## 2019-10-01 RX ADMIN — FUROSEMIDE 20 MG: 10 INJECTION, SOLUTION INTRAMUSCULAR; INTRAVENOUS at 14:04

## 2019-10-01 RX ADMIN — MEROPENEM 500 MG: 500 INJECTION, POWDER, FOR SOLUTION INTRAVENOUS at 21:24

## 2019-10-01 RX ADMIN — DECITABINE 41 MG: 50 INJECTION, POWDER, LYOPHILIZED, FOR SOLUTION INTRAVENOUS at 17:37

## 2019-10-01 RX ADMIN — VORICONAZOLE 200 MG: 200 TABLET, FILM COATED ORAL at 08:24

## 2019-10-01 RX ADMIN — ONDANSETRON 8 MG: 2 INJECTION INTRAMUSCULAR; INTRAVENOUS at 16:47

## 2019-10-01 RX ADMIN — MEROPENEM 500 MG: 500 INJECTION, POWDER, FOR SOLUTION INTRAVENOUS at 11:13

## 2019-10-01 RX ADMIN — PRAVASTATIN SODIUM 10 MG: 20 TABLET ORAL at 21:24

## 2019-10-01 RX ADMIN — MEROPENEM 500 MG: 500 INJECTION, POWDER, FOR SOLUTION INTRAVENOUS at 15:53

## 2019-10-01 RX ADMIN — DIPHENHYDRAMINE HYDROCHLORIDE 25 MG: 25 CAPSULE ORAL at 21:30

## 2019-10-01 RX ADMIN — SODIUM CHLORIDE 25 ML/HR: 900 INJECTION, SOLUTION INTRAVENOUS at 17:38

## 2019-10-01 RX ADMIN — VORICONAZOLE 200 MG: 200 TABLET, FILM COATED ORAL at 21:24

## 2019-10-01 RX ADMIN — POTASSIUM CHLORIDE 20 MEQ: 20 TABLET, EXTENDED RELEASE ORAL at 08:24

## 2019-10-01 RX ADMIN — ACYCLOVIR 400 MG: 800 TABLET ORAL at 18:05

## 2019-10-01 RX ADMIN — DULOXETINE 30 MG: 30 CAPSULE, DELAYED RELEASE ORAL at 08:24

## 2019-10-01 NOTE — PROGRESS NOTES
[address] [facility] Hematology & Oncology Inpatient Hematology / Oncology Progress Note Admission Date: 2019  6:30 PM 
Reason for Admission/Hospital Course: Febrile neutropenia ([facility] [address] [D70.9, R50.81] 24 Hour Events: 
Afebrile now Still dyspneic with exertion, repeat Lasix 20 today In good spirits today and feels better overall Plan for Dacogen 10 days while inpatient to start today ROS: 
Constitutional: negative for fever, chills, weakness, malaise, fatigue. CV:  negative for chest pain, palpitations, edema. Respiratory:  negative for dyspnea, cough, wheezing. GI: negative for nausea, abdominal pain. 10 point review of systems is otherwise negative with the exception of the elements mentioned above in the HPI. No Known Allergies OBJECTIVE: 
Patient Vitals for the past 8 hrs: 
 BP Temp Pulse Resp SpO2  
10/01/19 1210 141/77 98 °F (36.7 °C) 74 18 93 % 10/01/19 0804 140/67 98 °F (36.7 °C) 76 18 93 % Temp (24hrs), Av.2 °F (36.8 °C), Min:98 °F (36.7 °C), Max:98.6 °F (37 °C) 
 
10/01 0701 - 10/01 1900 In: 460 [P.O.:240; I.V.:525] Out: 650 [Urine:650] Physical Exam: 
Constitutional: Well developed, well nourished female in no acute distress, sitting up on side of bed HEENT: Normocephalic and atraumatic. Oropharynx is clear, mucous membranes are moist.  Neck supple. Patchy alopecia. Skin +large bruise to right side from previous fall, red indurated area to left upper posterior shoulder. Warm and dry. + pallor Respiratory Lungs clear, normal air exchange without accessory muscle use. TORREZ but improving. CVS Normal rate, regular rhythm and normal S1 and S2. No murmurs, gallops, or rubs. Abdomen Soft, nontender and nondistended, normoactive bowel sounds. No palpable mass. No hepatosplenomegaly. Neuro Grossly nonfocal with no obvious sensory or motor deficits. MSK Normal range of motion in general. 1+ BLE edema and no tenderness. Psych Appropriate mood and affect. Labs: 
   
Recent Labs 10/01/19 
0335 09/30/19 
2465 09/29/19 
5291 WBC 0.5* 0.5* 0.4* RBC 2.51* 2.62* 2.69* HGB 7.4* 7.7* 8.0*  
HCT 22.2* 22.9* 23.8* MCV 88.4 87.4 88.5 MCH 29.5 29.4 29.7 MCHC 33.3 33.6 33.6 RDW 14.0 14.6 14.2 PLT 25* 45* 8*  
GRANS 7*  --   --   
LYMPH 89*  --   -- MONOS 4  --   --   
EOS 0*  --   --   
BASOS 0  --   --   
IG 0  --   --   
DF AUTOMATED MANUAL MANUAL ANEU 0.0*  --   --   
ABL 0.5  --   --   
ABM 0.0*  --   --   
ALEKSANDR 0.0  --   --   
ABB 0.0  --   --   
AIG 0.0  --   --   
 
  
Recent Labs 10/01/19 
0335 09/30/19 
2928 09/29/19 
4533  145 146*  
K 3.9 3.8 3.6 * 110* 112* CO2 30 28 27 AGAP 3* 7 7 GLU 91 89 110* BUN 16 13 15 CREA 0.80 0.76 0.79 GFRAA >60 >60 >60 GFRNA >60 >60 >60  
CA 8.4 8.2* 8.2* SGOT 28 32 36 AP 85 80 78 TP 6.1* 6.3 6.2* ALB 2.3* 2.6* 2.3*  
GLOB 3.8* 3.7* 3.9* AGRAT 0.6* 0.7* 0.6* Imaging: XR CHEST SNGL V [[id number]] Collected: 09/29/19 1217 Order Status: Completed Updated: 09/29/19 1220 Narrative:    
Portable chest x-ray CLINICAL INDICATION: Crackles on physical exam 
 
FINDINGS: Single AP view the chest compared to a similar exam dated 9/24/2019 
shows the lungs to be slightly underexpanded. No airspace consolidation noted. No jens pulmonary edema. The cardiac silhouette and mediastinum are stable. A 
left-sided chest port is in place. Impression:    
IMPRESSION: Slightly underexpanded lungs, otherwise no acute abnormality. CT CHEST ABD PELV W CONT [[id number]] Collected: 09/27/19 1632 Order Status: Completed Updated: 09/27/19 1641 Narrative:    
CT CHEST ABDOMEN AND PELVIS WITH CONTRAST HISTORY: fevers, SOB, new O2, 73 years Female  Neutropenic fever Possible infection Leukemia COMPARISON: Chest radiograph September 24, 2019 TECHNIQUE:  Oral contrast was administered.  100 cc of nonionic intravenous contrast was injected, and axial helical CT images were obtained from the 
thoracic inlet through the pelvis. Coronal reformatted images were obtained at 
the scanner console and made available for review.  Radiation dose reduction 
techniques were used for this study:  Our CT scanners use one or all of the 
following: Automated exposure control, adjustment of the mA and/or kVp according 
to patient's size, iterative reconstruction. FINDINGS: 
 
CHEST: 
Partially visualized thyroid unremarkable.  No evidence of significant 
mediastinal, hilar, or axillary lymphadenopathy.  Minimal calcific 
atherosclerosis of a normal caliber aortic arch and descending aorta.  Left 
chest wall nelli catheter appears to remain in anatomic position.  Trace 
pericardial effusion.  Trace bilateral pleural effusions with associated mild 
dependent subsegmental atelectasis bilateral lung bases.  Visualized proximal 
airways unremarkable. ABDOMEN: 
Normal-appearing liver, gallbladder, spleen, pancreas, bilateral adrenal glands. 
 Small simple appearing right renal interpolar region cortical cyst measuring 2.4 cm in diameter.  Small nonobstructing right renal medullary level calculus 
measuring 2 mm in diameter.  Subcentimeter left renal cortical hypoenhancing 
lesions which are too small to characterize but probably cysts.  Mild calcific 
atherosclerosis of a normal caliber abdominal aorta.  No evidence of significant 
lymphadenopathy.  Normal-appearing small bowel.  No evidence of intraperitoneal 
free air or free fluid.  Image quality somewhat degraded by respiratory motion 
artifact. PELVIS: 
Normal-appearing urinary bladder.  Atrophic appearing uterus and bilateral 
adnexa with a coarse calcification of the uterine fundus which may represent a 
calcified fibroid.   Normal-appearing colon and partially visualized appendix. Trace pelvic free fluid, may be physiologic.  No evidence of significant inguinal or pelvic sidewall lymphadenopathy.  Visualized osseous structures 
unremarkable.    
Impression:    
IMPRESSION: 1.  Trace bilateral pleural effusions with a trace pericardial effusion. 2.  No other acute pathology identified.  Other incidental findings as above. XR CHEST PA LAT [[id number]] Collected: 09/24/19 1617 Order Status: Completed Updated: 09/24/19 1620 Narrative:    
CHEST X-RAY, 2 views 9/24/2019 History: Tachypnea and fever. Technique: PA and lateral views of the chest.  
 
Comparison: Chest x-ray 9/21/2019 Findings: A stable left-sided venous port is seen. The cardiac silhouette is mildly 
enlarged although stable.  The lungs are expanded without evidence for 
pneumothorax.  No evolving consolidation, or evidence of pleural effusion is 
seen. The bony thorax demonstrates no acute changes.  The upper abdomen is 
unremarkable in appearance. Impression:    
IMPRESSION:  
1.  Stable cardiomegaly without evolving acute changes evident by plain film 
imaging. XR CHEST PA LAT [[id number]] Collected: 09/21/19 2042 Order Status: Completed Updated: 09/21/19 2047 Narrative:    
EXAM: Chest x-ray. INDICATION: Febrile neutropenia. COMPARISON: May 18, 2019. TECHNIQUE: Frontal and lateral x-rays of the chest were obtained. FINDINGS: The lungs are clear except for minimal linear atelectasis or scarring 
in the lateral left costophrenic angle. The cardiac size, mediastinal contour 
and pulmonary vasculature are within normal limits. No pneumothorax or pleural 
effusion is seen. A left chest wall infusion port catheter remains in place. Impression:    
IMPRESSION: No acute process. Medications: 
Current Facility-Administered Medications Medication Dose Route Frequency  0.9% sodium chloride infusion 250 mL  250 mL IntraVENous PRN  
 diphenhydrAMINE (BENADRYL) capsule 25 mg  25 mg Oral Q6H PRN  
  loperamide (IMODIUM) capsule 2 mg  2 mg Oral Q4H PRN  
 diphenoxylate-atropine (LOMOTIL) tablet 2 Tab  2 Tab Oral QID PRN  
 meropenem (MERREM) 500 mg in 0.9% sodium chloride (MBP/ADV) 50 mL  0.5 g IntraVENous Q6H  
 voriconazole (VFEND) tablet 200 mg  200 mg Oral Q12H  potassium chloride (K-DUR, KLOR-CON) SR tablet 20 mEq  20 mEq Oral BID  polyethylene glycol (MIRALAX) packet 17 g  17 g Oral DAILY PRN  
 acetaminophen (TYLENOL) tablet 650 mg  650 mg Oral Q6H PRN  
 [Held by provider] gilteritinib tab (Xospata) 120 mg = 3 tabs  (Patient Supplied)  120 mg Oral PCL  sodium chloride (NS) flush 5-10 mL  5-10 mL IntraVENous PRN  
 DULoxetine (CYMBALTA) capsule 30 mg  30 mg Oral DAILY  LORazepam (ATIVAN) tablet 1 mg  1 mg Oral QHS  pravastatin (PRAVACHOL) tablet 10 mg  10 mg Oral QHS  zolpidem (AMBIEN) tablet 5 mg  5 mg Oral QHS PRN  
 acyclovir (ZOVIRAX) tablet 400 mg  400 mg Oral BID  influenza vaccine 2019-20 (6 mos+)(PF) (FLUARIX/FLULAVAL/FLUZONE QUAD) injection 0.5 mL  0.5 mL IntraMUSCular PRIOR TO DISCHARGE ASSESSMENT: 
 
Problem List  Date Reviewed: 9/19/2019 Codes Class Noted * (Principal) Febrile neutropenia (HCC) ICD-10-CM: D70.9, R50.81 ICD-9-CM: 288.00, 780.61  9/21/2019 Pancytopenia due to antineoplastic chemotherapy [facility]) ICD-10-CM: D61.810, T45.1X5A 
ICD-9-CM: 284.11, E933.1  6/12/2019 Cellulitis of neck ICD-10-CM: L46.363 ICD-9-CM: 682.1  6/12/2019 Immunocompromised status associated with infection ICD-10-CM: B99.9 ICD-9-CM: 136.9  6/12/2019 Port or reservoir infection ICD-10-CM: W46.392P ICD-9-CM: 999.33  6/12/2019 Acute myeloid leukemia not having achieved remission ([facility] ICD-10-CM: C92.00 ICD-9-CM: 205.00  5/9/2019 Admission for antineoplastic chemotherapy ICD-10-CM: Z51.11 ICD-9-CM: V58.11  5/5/2019 AML (acute myeloblastic leukemia) ([facility] ICD-10-CM: C92.00 ICD-9-CM: 205.00  4/28/2019 Weakness generalized ICD-10-CM: R53.1 ICD-9-CM: 780.79  4/28/2019 Pancytopenia ([facility] ICD-10-CM: A00.367 ICD-9-CM: 284.19  4/28/2019 Thrombocytopenia ([facility] ICD-10-CM: D69.6 ICD-9-CM: 287.5  4/27/2019 Ms. Luna Doshi is a 68year old female who was admitted on 9/21/2019 for neutropenic fever. She has been having intermittent low grade fevers over the last week. She came to infusion yesterday for a blood transfusion, and after going home she had a fever of 102.5 and was sent to the ER for evaluation. She is a known patient of Dr. Alejandra Rothman with AML. Initially treated with induction 7+3 and Midostaurin. Most recently on Decitabine plus Gilteritinib with cycle 2 beginning on 8/26/2019 and cycle 3 due 9/23/2019. CXR negative. UA clear. BC/UC NTD. Started on zosyn and vanc. PLAN: 
AML 
-On Dacogen/Gilteritinib with most recent cycle 2 on 8/26/2019 with cycle 3 due 9/23/2019 9/23 Discuss BMbx flow results with patient-persistent AML-58% blasts. Still waiting on final pathology. 9/24 Bone marrow biopsy final path still pending. 9/30 Plan to restart Dacogen while inpatient for 10 days. 10/1 Dacogen to restart today. Plan to start Gilteritinib on D5 if she remains afebrile.  
  
Neutropenic Fever 
-UA clear, BC/UC NTD 
-On Zosyn/Vanc empirically 9/23 Tmax 100.8. Feeling better today. Day 2 zosyn and vanc. BC/UC NTD 
9/24 Tmax 100.6. Day 3 vanc/zosyn. BC/UC still NGTD.  
9/25 Check aspergillus, fungitell, CMV. Add antifungal - vfend. CXR and BCx repeated yesterday and negative 9/26 BCx remain negative. Repeated this morning. Cdiff negative. D/c vancomycin. Continue zosyn, acyclovir, voriconazole. Consult ID for additional recommendations. Of note, gilteritinib can cause fevers in 13-35% of patients. Unclear timeline for febrile episodes? Will try to contact rep to discuss 9/27 Appreciate ID support. Changed to merrem.  Given shortness of breath this morning, will proceed with CT CAP to eval for source. Could still be from drug vs disease as well 9/28 CT CAP negative for source of infection, PE. Did show trace pleural/pericardial effusion. Continue antibiotics. At this point, this may likely be drug or disease related fevers. Will hold gilteritinib and monitor fever curve 9/29 Gilteritinib remains on hold secondary to fevers 9/30 .8 overnight. Discussed with ID and okay to restart Dacogen, thorough workup and fevers improved overall. 10/1 Afebrile overnight and today thus far. Continuing on Merrem. Diarrhea 9/26 Cdiff negative. Add imodium/lomotil PRN Pancytopenia related to chemo/disease 9/23 hgb 6.5 transfuse per [facility] SOPs 
9/24 Hgb up to 7.4.  
9/28 PRBCs today 9/29 Plt today, lasix following transfusion 9/30 WBC 0.5, Hgb 7.7, Plt 45 k no transfusions today. 10/1 WBC 0.5, Hgb 7.4, Plt 25k no need for transfusions today. Dyspnea 9/27 BNP elevated at 458. Check echo. DC fluids. Lasix x 1 
9/28 Repeat BNP tomorrow AM. Lasix after blood today 9/29 Dyspneic with exertion. Repeat lasix after platelets. Echo still pending. Repeat CXR. Noted crackles to left base 9/30 TORREZ improving slowly. Repeat Lasix 20 mg today. 10/1 TORREZ improving. Repeat Lasix today 20 mg x 1. Goals and plan of care reviewed with the patient. All questions answered to the best of our ability. Disposition:  Dacogen to restart 10/1. Also restart Gilteritinib on D5 of Dacogen. Will remain inpatient for 10 days of Dacogen. Shoshana Mac NP [facility] Hematology and Oncology [address] [place] [address] Office : [phone] Fax : [phone] Attending Addendum: 
I have personally performed a face to face diagnostic evaluation on this patient.  I have reviewed and agree with the care plan as documented above by Shoshana Mac, N.P.  My findings are as follows: Patient appears stable, heart rate regular without murmurs, abdomen is non-tender, bowel sounds are positive. 68 female, well-known to me for her history of AML, FLT3 ITD positive, failed induction with 7+3 with midostaurin, most recently on Dacogen plus Gilteritinib. Now admitted with neutropenic fevers, no clear infectious etiology. ID on board. Fevers felt to be likely leukemia related versus medications. Her Gilteritinib has now been on hold. Continues with fevers. Recent BM biopsy with persistent residual AML: Discussed options, start Dacogen x10 days. Will reintroduce Gilteritinib a few days later. Continue regular labs with transfusions as needed. Recent CT CAP without any clear source of infection. Continue broad-spectrum antibiotics. Total time 35 min 50% in direct consultation about the patient's diagnosis and management Ilan Bella MD 
[facility] Hematology/Oncology [id number] [place] [address] Office : [phone] Fax : [phone]

## 2019-10-01 NOTE — PROGRESS NOTES
Infectious Disease Progress Note Today's Date: 10/1/2019 Admit Date: 2019 Impression: · Neutropenic fever; trended down over the past 72 hours. Unclear reason as to why. Meropenem? · No localizing symptoms, unclear etiology - drug reaction vs AML vs infection. CT negative. BC/Ucx negative to date. Mild diarrhea: C.diff negative. CMV, Fungitell, Galactomannan: negative · AML- dx 2019 - thus far not responding to treatment 58% blasts Plan:  
· Continue Merrem; duration to be determined · Continue ACV, Vfend · Dacogen to restart today. Anti-infectives: · Vanc - · Zosyn - · merrem  - current · Voriconazole - current · Acyclovir prophy Subjective:  
Resting in bed with significant other at bedside. She feels well with no fever/chills No Known Allergies Review of Systems:  A comprehensive review of systems was negative except for that written in the History of Present Illness. Objective:  
 
Visit Vitals /67 (BP 1 Location: Right arm, BP Patient Position: Sitting) Pulse 76 Temp 98 °F (36.7 °C) Resp 18 Ht 5' 6\" (1.676 m) Wt 88.9 kg (196 lb) SpO2 93% Breastfeeding? No  
BMI 31.64 kg/m² Temp (24hrs), Av.2 °F (36.8 °C), Min:97.9 °F (36.6 °C), Max:98.6 °F (37 °C) Lines:  L chest port c/d/i Physical Exam:   
General:  Alert, cooperative, sitting up in bed;  at bedside Eyes:  Sclera anicteric. Pupils equally round and reactive to light. Mouth/Throat: Mucous membranes normal, oral pharynx clear Neck: Supple Lungs:   Clear to auscultation bilaterally, good effort CV:  Regular rate and rhythm,no murmur, click, rub or gallop Abdomen:   Soft, non-tender. bowel sounds normal; non-distended Extremities: No cyanosis or edema Skin: Skin color, texture, turgor normal. no acute rash or lesions Musculoskeletal: No swelling or deformity Lines/Devices:  Intact, no erythema, drainage or tenderness Psych: Alert and oriented, normal mood affect given the setting Data Review: CBC: 
Recent Labs 10/01/19 
0335 09/30/19 
2276 09/29/19 
2288 WBC 0.5* 0.5* 0.4* GRANS 7*  --   -- MONOS 4  --   --   
EOS 0*  --   --   
ANEU 0.0*  --   --   
ABL 0.5  --   --   
HGB 7.4* 7.7* 8.0*  
HCT 22.2* 22.9* 23.8*  
PLT 25* 45* 8* BMP: 
Recent Labs 10/01/19 
0335 09/30/19 
1335 09/29/19 
6089 CREA 0.80 0.76 0.79 BUN 16 13 15  145 146*  
K 3.9 3.8 3.6 * 110* 112* CO2 30 28 27 AGAP 3* 7 7 GLU 91 89 110* LFTS: 
Recent Labs 10/01/19 
0335 09/30/19 
2538 09/29/19 
3167 TBILI 0.5 0.5 0.6 ALT 28 29 34 SGOT 28 32 36 AP 85 80 78 TP 6.1* 6.3 6.2* ALB 2.3* 2.6* 2.3* Microbiology:  
 
All Micro Results Procedure Component Value Units Date/Time CULTURE, BLOOD [[id number]] Collected:  09/26/19 0753 Order Status:  Completed Specimen:  Blood Updated:  10/01/19 0604 Special Requests: SINGLE PORT Culture result: NO GROWTH 5 DAYS     
 CULTURE, BLOOD [[id number]] Collected:  09/26/19 1050 Order Status:  Completed Specimen:  Blood Updated:  10/01/19 2917 Special Requests: --     
  LEFT 
FOREARM Culture result: NO GROWTH 5 DAYS     
 CULTURE, BLOOD [[id number]] Collected:  09/24/19 1536 Order Status:  Completed Specimen:  Blood Updated:  09/29/19 1107 Special Requests: LATERAL PORT Culture result: NO GROWTH 5 DAYS     
 CULTURE, BLOOD [[id number]] Collected:  09/24/19 1530 Order Status:  Completed Specimen:  Blood Updated:  09/29/19 1107 Special Requests: --     
  LEFT 
HAND Culture result: NO GROWTH 5 DAYS     
 CMV BY PCR, QT [[id number]] Collected:  09/25/19 2016 Order Status:  Completed Specimen:  Blood from Plasma Updated:  09/28/19 2035 CMV IU/mL NEGATIVE  IU/mL Comment: (NOTE) No CMV DNA detected. The quantitative range of this assay is 200 to 1 million IU/mL. This test was developed and its performance characteristics 
determined by [employer] It has not been cleared or approved by the 
Food and Drug Administration. The FDA has determined that such 
clearance or approval is not necessary. Performed At: [address] [id number] Elvis Ward MD [id number] CMV log 10 IU/mL TEST NOT PERFORMED log10 IU/mL Comment: (NOTE) Unable to calculate result since non-numeric result obtained for 
component test. 
  
  
 C. DIFFICILE AG & TOXIN A/B [[id number]] Collected:  09/25/19 1703 Order Status:  Completed Specimen:  Stool Updated:  09/26/19 1203 [id number] Elena Gibson ANTIGEN    
  C. DIFFICILE GDH ANTIGEN-NEGATIVE  
     
  C. difficile toxin C. DIFFICILE TOXIN-NEGATIVE  
     
  PCR Reflex NOT APPLICABLE INTERPRETATION    
  NEGATIVE FOR TOXIGENIC C. DIFFICILE Clinical Consideration NEGATIVE FOR TOXIGENIC C. DIFFICILE  
     
 CULTURE, BLOOD [[id number]] Collected:  09/21/19 1903 Order Status:  Completed Specimen:  Blood Updated:  09/26/19 1621 Special Requests: --     
  LEFT Antecubital 
  
  Culture result: NO GROWTH 5 DAYS     
 CULTURE, BLOOD [[id number]] Collected:  09/21/19 1850 Order Status:  Completed Specimen:  Blood Updated:  09/26/19 5229 Special Requests: --     
  LEFT 
PORT Culture result: NO GROWTH 5 DAYS     
 CULTURE, URINE [[id number]] Collected:  09/21/19 2039 Order Status:  Completed Specimen:  Urine from Clean catch Updated:  09/24/19 0700 Special Requests: NO SPECIAL REQUESTS Culture result:    
  <10,000 COLONIES/mL MIXED SKIN REGGIE ISOLATED Imaging:  
CT chest/abd/pelv (9/27/19) IMPRESSION:  
1. Trace bilateral pleural effusions with a trace pericardial effusion. 2.  No other acute pathology identified. Other incidental findings as above. Signed By: Kelton Tejada MD   
 October 1, 2019

## 2019-10-02 LAB
ALBUMIN SERPL-MCNC: 2.3 G/DL (ref 3.2–4.6)
ALBUMIN/GLOB SERPL: 0.6 {RATIO} (ref 1.2–3.5)
ALP SERPL-CCNC: 89 U/L (ref 50–136)
ALT SERPL-CCNC: 31 U/L (ref 12–65)
ANION GAP SERPL CALC-SCNC: 4 MMOL/L (ref 7–16)
AST SERPL-CCNC: 26 U/L (ref 15–37)
BASOPHILS # BLD: 0 K/UL (ref 0–0.2)
BASOPHILS NFR BLD: 0 % (ref 0–2)
BILIRUB SERPL-MCNC: 0.4 MG/DL (ref 0.2–1.1)
BUN SERPL-MCNC: 15 MG/DL (ref 8–23)
CALCIUM SERPL-MCNC: 8.4 MG/DL (ref 8.3–10.4)
CHLORIDE SERPL-SCNC: 109 MMOL/L (ref 98–107)
CO2 SERPL-SCNC: 32 MMOL/L (ref 21–32)
CREAT SERPL-MCNC: 0.67 MG/DL (ref 0.6–1)
DIFFERENTIAL METHOD BLD: ABNORMAL
EOSINOPHIL # BLD: 0 K/UL (ref 0–0.8)
EOSINOPHIL NFR BLD: 0 % (ref 0.5–7.8)
ERYTHROCYTE [DISTWIDTH] IN BLOOD BY AUTOMATED COUNT: 13.9 % (ref 11.9–14.6)
GLOBULIN SER CALC-MCNC: 3.9 G/DL (ref 2.3–3.5)
GLUCOSE SERPL-MCNC: 91 MG/DL (ref 65–100)
HCT VFR BLD AUTO: 22.3 % (ref 35.8–46.3)
HGB BLD-MCNC: 7.4 G/DL (ref 11.7–15.4)
IMM GRANULOCYTES # BLD AUTO: 0 K/UL (ref 0–0.5)
IMM GRANULOCYTES NFR BLD AUTO: 0 % (ref 0–5)
LYMPHOCYTES # BLD: 0.4 K/UL (ref 0.5–4.6)
LYMPHOCYTES NFR BLD: 92 % (ref 13–44)
MCH RBC QN AUTO: 29.6 PG (ref 26.1–32.9)
MCHC RBC AUTO-ENTMCNC: 33.2 G/DL (ref 31.4–35)
MCV RBC AUTO: 89.2 FL (ref 79.6–97.8)
MONOCYTES # BLD: 0 K/UL (ref 0.1–1.3)
MONOCYTES NFR BLD: 3 % (ref 4–12)
NEUTS SEG # BLD: 0 K/UL (ref 1.7–8.2)
NEUTS SEG NFR BLD: 5 % (ref 43–78)
NRBC # BLD: 0 K/UL (ref 0–0.2)
PLATELET # BLD AUTO: 19 K/UL (ref 150–450)
PLATELET COMMENTS,PCOM: ABNORMAL
PMV BLD AUTO: 11.5 FL (ref 9.4–12.3)
POTASSIUM SERPL-SCNC: 4.2 MMOL/L (ref 3.5–5.1)
PROT SERPL-MCNC: 6.2 G/DL (ref 6.3–8.2)
RBC # BLD AUTO: 2.5 M/UL (ref 4.05–5.2)
RBC MORPH BLD: ABNORMAL
SODIUM SERPL-SCNC: 145 MMOL/L (ref 136–145)
WBC # BLD AUTO: 0.4 K/UL (ref 4.3–11.1)
WBC MORPH BLD: ABNORMAL

## 2019-10-02 PROCEDURE — 74011000258 HC RX REV CODE- 258: Performed by: NURSE PRACTITIONER

## 2019-10-02 PROCEDURE — 85025 COMPLETE CBC W/AUTO DIFF WBC: CPT

## 2019-10-02 PROCEDURE — 74011250636 HC RX REV CODE- 250/636: Performed by: INTERNAL MEDICINE

## 2019-10-02 PROCEDURE — 74011250637 HC RX REV CODE- 250/637: Performed by: INTERNAL MEDICINE

## 2019-10-02 PROCEDURE — 65270000029 HC RM PRIVATE

## 2019-10-02 PROCEDURE — 74011000258 HC RX REV CODE- 258: Performed by: INTERNAL MEDICINE

## 2019-10-02 PROCEDURE — 74011250637 HC RX REV CODE- 250/637: Performed by: NURSE PRACTITIONER

## 2019-10-02 PROCEDURE — 74011250636 HC RX REV CODE- 250/636: Performed by: NURSE PRACTITIONER

## 2019-10-02 PROCEDURE — 99233 SBSQ HOSP IP/OBS HIGH 50: CPT | Performed by: INTERNAL MEDICINE

## 2019-10-02 PROCEDURE — 80053 COMPREHEN METABOLIC PANEL: CPT

## 2019-10-02 RX ORDER — VANCOMYCIN 2 GRAM/500 ML IN 0.9 % SODIUM CHLORIDE INTRAVENOUS
2000 ONCE
Status: COMPLETED | OUTPATIENT
Start: 2019-10-02 | End: 2019-10-02

## 2019-10-02 RX ORDER — CALCIUM CARBONATE 200(500)MG
200 TABLET,CHEWABLE ORAL
Status: DISCONTINUED | OUTPATIENT
Start: 2019-10-02 | End: 2019-10-10 | Stop reason: HOSPADM

## 2019-10-02 RX ORDER — VANCOMYCIN HYDROCHLORIDE
1250 EVERY 12 HOURS
Status: DISCONTINUED | OUTPATIENT
Start: 2019-10-02 | End: 2019-10-04

## 2019-10-02 RX ADMIN — VORICONAZOLE 200 MG: 200 TABLET, FILM COATED ORAL at 21:28

## 2019-10-02 RX ADMIN — ACYCLOVIR 400 MG: 800 TABLET ORAL at 08:39

## 2019-10-02 RX ADMIN — VANCOMYCIN HYDROCHLORIDE 2000 MG: 10 INJECTION, POWDER, LYOPHILIZED, FOR SOLUTION INTRAVENOUS at 11:22

## 2019-10-02 RX ADMIN — MEROPENEM 500 MG: 500 INJECTION, POWDER, FOR SOLUTION INTRAVENOUS at 21:27

## 2019-10-02 RX ADMIN — POTASSIUM CHLORIDE 20 MEQ: 20 TABLET, EXTENDED RELEASE ORAL at 08:39

## 2019-10-02 RX ADMIN — LORAZEPAM 1 MG: 1 TABLET ORAL at 21:28

## 2019-10-02 RX ADMIN — ONDANSETRON 8 MG: 2 INJECTION INTRAMUSCULAR; INTRAVENOUS at 15:58

## 2019-10-02 RX ADMIN — DIPHENHYDRAMINE HYDROCHLORIDE 25 MG: 25 CAPSULE ORAL at 21:28

## 2019-10-02 RX ADMIN — DECITABINE 41 MG: 50 INJECTION, POWDER, LYOPHILIZED, FOR SOLUTION INTRAVENOUS at 17:25

## 2019-10-02 RX ADMIN — DULOXETINE 30 MG: 30 CAPSULE, DELAYED RELEASE ORAL at 08:39

## 2019-10-02 RX ADMIN — PRAVASTATIN SODIUM 10 MG: 20 TABLET ORAL at 21:28

## 2019-10-02 RX ADMIN — MEROPENEM 500 MG: 500 INJECTION, POWDER, FOR SOLUTION INTRAVENOUS at 17:25

## 2019-10-02 RX ADMIN — MEROPENEM 500 MG: 500 INJECTION, POWDER, FOR SOLUTION INTRAVENOUS at 05:03

## 2019-10-02 RX ADMIN — MEROPENEM 500 MG: 500 INJECTION, POWDER, FOR SOLUTION INTRAVENOUS at 11:06

## 2019-10-02 RX ADMIN — POTASSIUM CHLORIDE 20 MEQ: 20 TABLET, EXTENDED RELEASE ORAL at 18:20

## 2019-10-02 RX ADMIN — VANCOMYCIN HYDROCHLORIDE 1250 MG: 10 INJECTION, POWDER, LYOPHILIZED, FOR SOLUTION INTRAVENOUS at 22:05

## 2019-10-02 RX ADMIN — ACYCLOVIR 400 MG: 800 TABLET ORAL at 18:21

## 2019-10-02 RX ADMIN — VORICONAZOLE 200 MG: 200 TABLET, FILM COATED ORAL at 08:39

## 2019-10-02 NOTE — PROGRESS NOTES
END OF SHIFT NOTE: 
 
Intake/Output 10/02 0701 - 10/02 1900 In: 860 [P.O.:860] Out: 600 [Urine:600] Voiding: YES Catheter: NO 
Drain:   
 
 
 
 
 
Stool:  1 occurrences. Stool Assessment Stool Color: Brown (09/28/19 0930) Stool Appearance: Loose (10/02/19 0839) Stool Amount: Small (09/28/19 0930) Stool Source/Status: Rectum (09/28/19 0930) Emesis:  0 occurrences. VITAL SIGNS Patient Vitals for the past 12 hrs: 
 Temp Pulse Resp BP SpO2  
10/02/19 1537 98.1 °F (36.7 °C) 77 18 138/70 96 % 10/02/19 1132 98.1 °F (36.7 °C) 80 18 121/55 93 % 10/02/19 0759 98.2 °F (36.8 °C) 81 18 137/69 94 % Pain Assessment Pain 1 Pain Scale 1: Numeric (0 - 10) (10/02/19 1137) Pain Intensity 1: 0 (10/02/19 1137) Patient Stated Pain Goal: 0 (10/02/19 1137) Pain Reassessment 1: Patient resting w/respiratory rate greater than 10 (09/29/19 0235) Pain Onset 1: 20 minutes (09/23/19 0334) Pain Location 1: Head (09/23/19 0334) Pain Orientation 1: Anterior (09/23/19 0334) Pain Description 1: Dull (09/23/19 0334) Pain Intervention(s) 1: Medication (see MAR) (09/23/19 0334) Ambulating Yes Additional Information: Pt resting in bed with family at bedside. Rash noted to upper back at end of shift, NP contacted ok to watch and apply cortisone cream. Pt received Dacogen  Infusion, tolerated well. No other needs at this time. Shift report given to oncoming nurse at the bedside. Eliud Mcnamara

## 2019-10-02 NOTE — PROGRESS NOTES
[facility] Hematology & Oncology Inpatient Hematology / Oncology Progress Note Admission Date: 2019  6:30 PM 
Reason for Admission/Hospital Course: Febrile neutropenia ([address] [D70.9, R50.81] 24 Hour Events: 
Afebrile now TORREZ much improved In good spirits with positive attitude D2 of Dacogen Added Vanc for possible skin infection to left shoulder ROS: 
Constitutional: negative for fever, chills, weakness, malaise, fatigue. CV:  negative for chest pain, palpitations, edema. Respiratory:  negative for dyspnea, cough, wheezing. GI: negative for nausea, abdominal pain. 10 point review of systems is otherwise negative with the exception of the elements mentioned above in the HPI. No Known Allergies OBJECTIVE: 
Patient Vitals for the past 8 hrs: 
 BP Temp Pulse Resp SpO2  
10/02/19 1132 121/55 98.1 °F (36.7 °C) 80 18 93 % 10/02/19 0759 137/69 98.2 °F (36.8 °C) 81 18 94 % Temp (24hrs), Av °F (36.7 °C), Min:97.7 °F (36.5 °C), Max:98.2 °F (36.8 °C) 
 
10/02 0701 - 10/02 1900 In: 360 [P.O.:360] Out: 600 [Urine:600] Physical Exam: 
Constitutional: Well developed, well nourished female in no acute distress, lying in hospital bed. HEENT: Normocephalic and atraumatic. Oropharynx is clear, mucous membranes are moist.  Neck supple. Patchy alopecia. Skin +large bruise to right side from previous fall, red indurated area to left upper posterior shoulder. Warm and dry. + pallor Respiratory Lungs clear, normal air exchange without accessory muscle use. TORREZ greatly improved. CVS Normal rate, regular rhythm and normal S1 and S2. No murmurs, gallops, or rubs. Abdomen Soft, nontender and nondistended, normoactive bowel sounds. No palpable mass. No hepatosplenomegaly. Neuro Grossly nonfocal with no obvious sensory or motor deficits. MSK Normal range of motion in general. Trace BLE edema and no tenderness. Psych Appropriate mood and affect. Labs: Recent Labs 10/02/19 
9666 10/01/19 
0335 09/30/19 
4698 WBC 0.4* 0.5* 0.5* RBC 2.50* 2.51* 2.62* HGB 7.4* 7.4* 7.7* HCT 22.3* 22.2* 22.9* MCV 89.2 88.4 87.4 MCH 29.6 29.5 29.4 MCHC 33.2 33.3 33.6 RDW 13.9 14.0 14.6 PLT 19* 25* 45* GRANS 5* 7*  --   
LYMPH 92* 89*  --   
MONOS 3* 4  --   
EOS 0* 0*  --   
BASOS 0 0  --   
IG 0 0  --   
DF AUTOMATED AUTOMATED MANUAL ANEU 0.0* 0.0*  -- ABL 0.4* 0.5  --   
ABM 0.0* 0.0*  --   
ALEKSANDR 0.0 0.0  --   
ABB 0.0 0.0  --   
AIG 0.0 0.0  --   
 
  
Recent Labs 10/02/19 
0836 10/01/19 
0335 09/30/19 
1129  142 145  
K 4.2 3.9 3.8 * 109* 110* CO2 32 30 28 AGAP 4* 3* 7 GLU 91 91 89 BUN 15 16 13 CREA 0.67 0.80 0.76 GFRAA >60 >60 >60 GFRNA >60 >60 >60  
CA 8.4 8.4 8.2* SGOT 26 28 32 AP 89 85 80  
TP 6.2* 6.1* 6.3 ALB 2.3* 2.3* 2.6*  
GLOB 3.9* 3.8* 3.7* AGRAT 0.6* 0.6* 0.7* Imaging: XR CHEST SNGL V [[id number]] Collected: 09/29/19 1217 Order Status: Completed Updated: 09/29/19 1220 Narrative:    
Portable chest x-ray CLINICAL INDICATION: Crackles on physical exam 
 
FINDINGS: Single AP view the chest compared to a similar exam dated 9/24/2019 
shows the lungs to be slightly underexpanded. No airspace consolidation noted. No jens pulmonary edema. The cardiac silhouette and mediastinum are stable. A 
left-sided chest port is in place. Impression:    
IMPRESSION: Slightly underexpanded lungs, otherwise no acute abnormality. CT CHEST ABD PELV W CONT [[id number]] Collected: 09/27/19 1632 Order Status: Completed Updated: 09/27/19 1641 Narrative:    
CT CHEST ABDOMEN AND PELVIS WITH CONTRAST HISTORY: fevers, SOB, new O2, 73 years Female  Neutropenic fever Possible infection Leukemia COMPARISON: Chest radiograph September 24, 2019 TECHNIQUE:  Oral contrast was administered.  100 cc of nonionic intravenous 
contrast was injected, and axial helical CT images were obtained from the 
 thoracic inlet through the pelvis. Coronal reformatted images were obtained at 
the scanner console and made available for review.  Radiation dose reduction 
techniques were used for this study:  Our CT scanners use one or all of the 
following: Automated exposure control, adjustment of the mA and/or kVp according 
to patient's size, iterative reconstruction. FINDINGS: 
 
CHEST: 
Partially visualized thyroid unremarkable.  No evidence of significant 
mediastinal, hilar, or axillary lymphadenopathy.  Minimal calcific 
atherosclerosis of a normal caliber aortic arch and descending aorta.  Left 
chest wall nelli catheter appears to remain in anatomic position.  Trace 
pericardial effusion.  Trace bilateral pleural effusions with associated mild 
dependent subsegmental atelectasis bilateral lung bases.  Visualized proximal 
airways unremarkable. ABDOMEN: 
Normal-appearing liver, gallbladder, spleen, pancreas, bilateral adrenal glands. 
 Small simple appearing right renal interpolar region cortical cyst measuring 2.4 cm in diameter.  Small nonobstructing right renal medullary level calculus 
measuring 2 mm in diameter.  Subcentimeter left renal cortical hypoenhancing 
lesions which are too small to characterize but probably cysts.  Mild calcific 
atherosclerosis of a normal caliber abdominal aorta.  No evidence of significant 
lymphadenopathy.  Normal-appearing small bowel.  No evidence of intraperitoneal 
free air or free fluid.  Image quality somewhat degraded by respiratory motion 
artifact. PELVIS: 
Normal-appearing urinary bladder.  Atrophic appearing uterus and bilateral 
adnexa with a coarse calcification of the uterine fundus which may represent a 
calcified fibroid.   Normal-appearing colon and partially visualized appendix.   
Trace pelvic free fluid, may be physiologic.  No evidence of significant 
inguinal or pelvic sidewall lymphadenopathy.  Visualized osseous structures 
unremarkable.    
 Impression:    
IMPRESSION: 1.  Trace bilateral pleural effusions with a trace pericardial effusion. 2.  No other acute pathology identified.  Other incidental findings as above. XR CHEST PA LAT [[id number]] Collected: 09/24/19 1617 Order Status: Completed Updated: 09/24/19 1620 Narrative:    
CHEST X-RAY, 2 views 9/24/2019 History: Tachypnea and fever. Technique: PA and lateral views of the chest.  
 
Comparison: Chest x-ray 9/21/2019 Findings: A stable left-sided venous port is seen. The cardiac silhouette is mildly 
enlarged although stable.  The lungs are expanded without evidence for 
pneumothorax.  No evolving consolidation, or evidence of pleural effusion is 
seen. The bony thorax demonstrates no acute changes.  The upper abdomen is 
unremarkable in appearance. Impression:    
IMPRESSION:  
1.  Stable cardiomegaly without evolving acute changes evident by plain film 
imaging. XR CHEST PA LAT [[id number]] Collected: 09/21/19 2042 Order Status: Completed Updated: 09/21/19 2047 Narrative:    
EXAM: Chest x-ray. INDICATION: Febrile neutropenia. COMPARISON: May 18, 2019. TECHNIQUE: Frontal and lateral x-rays of the chest were obtained. FINDINGS: The lungs are clear except for minimal linear atelectasis or scarring 
in the lateral left costophrenic angle. The cardiac size, mediastinal contour 
and pulmonary vasculature are within normal limits. No pneumothorax or pleural 
effusion is seen. A left chest wall infusion port catheter remains in place. Impression:    
IMPRESSION: No acute process. Medications: 
Current Facility-Administered Medications Medication Dose Route Frequency  vancomycin (VANCOCIN) 1250 mg in  ml infusion  1,250 mg IntraVENous Q12H  
 ondansetron (ZOFRAN) injection 8 mg  8 mg IntraVENous Q24H  
 decitabine (DACOGEN) 41 mg in 0.9% sodium chloride 100 mL chemo infusion  41 mg IntraVENous Q24H  0.9% sodium chloride infusion 250 mL  250 mL IntraVENous PRN  
 diphenhydrAMINE (BENADRYL) capsule 25 mg  25 mg Oral Q6H PRN  
 loperamide (IMODIUM) capsule 2 mg  2 mg Oral Q4H PRN  
 diphenoxylate-atropine (LOMOTIL) tablet 2 Tab  2 Tab Oral QID PRN  
 meropenem (MERREM) 500 mg in 0.9% sodium chloride (MBP/ADV) 50 mL  0.5 g IntraVENous Q6H  
 voriconazole (VFEND) tablet 200 mg  200 mg Oral Q12H  potassium chloride (K-DUR, KLOR-CON) SR tablet 20 mEq  20 mEq Oral BID  polyethylene glycol (MIRALAX) packet 17 g  17 g Oral DAILY PRN  
 acetaminophen (TYLENOL) tablet 650 mg  650 mg Oral Q6H PRN  
 [Held by provider] gilteritinib tab (Xospata) 120 mg = 3 tabs  (Patient Supplied)  120 mg Oral PCL  sodium chloride (NS) flush 5-10 mL  5-10 mL IntraVENous PRN  
 DULoxetine (CYMBALTA) capsule 30 mg  30 mg Oral DAILY  LORazepam (ATIVAN) tablet 1 mg  1 mg Oral QHS  pravastatin (PRAVACHOL) tablet 10 mg  10 mg Oral QHS  zolpidem (AMBIEN) tablet 5 mg  5 mg Oral QHS PRN  
 acyclovir (ZOVIRAX) tablet 400 mg  400 mg Oral BID  influenza vaccine 2019-20 (6 mos+)(PF) (FLUARIX/FLULAVAL/FLUZONE QUAD) injection 0.5 mL  0.5 mL IntraMUSCular PRIOR TO DISCHARGE ASSESSMENT: 
 
Problem List  Date Reviewed: 9/19/2019 Codes Class Noted * (Principal) Febrile neutropenia (HCC) ICD-10-CM: D70.9, R50.81 ICD-9-CM: 288.00, 780.61  9/21/2019 Pancytopenia due to antineoplastic chemotherapy [facility]) ICD-10-CM: D61.810, T45.1X5A 
ICD-9-CM: 284.11, E933.1  6/12/2019 Cellulitis of neck ICD-10-CM: U64.984 ICD-9-CM: 682.1  6/12/2019 Immunocompromised status associated with infection ICD-10-CM: B99.9 ICD-9-CM: 136.9  6/12/2019 Port or reservoir infection ICD-10-CM: W81.750C ICD-9-CM: 999.33  6/12/2019 Acute myeloid leukemia not having achieved remission ([facility] ICD-10-CM: C92.00 ICD-9-CM: 205.00  5/9/2019  Admission for antineoplastic chemotherapy ICD-10-CM: Z51.11 
 ICD-9-CM: V58.11  5/5/2019 AML (acute myeloblastic leukemia) ([facility] ICD-10-CM: C92.00 ICD-9-CM: 205.00  4/28/2019 Weakness generalized ICD-10-CM: R53.1 ICD-9-CM: 780.79  4/28/2019 Pancytopenia ([facility] ICD-10-CM: G66.006 ICD-9-CM: 284.19  4/28/2019 Thrombocytopenia ([facility] ICD-10-CM: D69.6 ICD-9-CM: 287.5  4/27/2019 Ms. Leticia Machado is a 68year old female who was admitted on 9/21/2019 for neutropenic fever. She has been having intermittent low grade fevers over the last week. She came to infusion yesterday for a blood transfusion, and after going home she had a fever of 102.5 and was sent to the ER for evaluation. She is a known patient of Dr. Bhavna Negrete with AML. Initially treated with induction 7+3 and Midostaurin. Most recently on Decitabine plus Gilteritinib with cycle 2 beginning on 8/26/2019 and cycle 3 due 9/23/2019. CXR negative. UA clear. BC/UC NTD. Started on zosyn and vanc. PLAN: 
AML 
-On Dacogen/Gilteritinib with most recent cycle 2 on 8/26/2019 with cycle 3 due 9/23/2019 9/23 Discuss BMbx flow results with patient-persistent AML-58% blasts. Still waiting on final pathology. 9/24 Bone marrow biopsy final path still pending. 9/30 Plan to restart Dacogen while inpatient for 10 days. 10/1 Dacogen to restart today. Plan to start Gilteritinib on D5 if she remains afebrile. 10/2 Day 2 of Dacogen. Tolerating well.   
  
Neutropenic Fever 
-UA clear, BC/UC NTD 
-On Zosyn/Vanc empirically 9/23 Tmax 100.8. Feeling better today. Day 2 zosyn and vanc. BC/UC NTD 
9/24 Tmax 100.6. Day 3 vanc/zosyn. BC/UC still NGTD.  
9/25 Check aspergillus, fungitell, CMV. Add antifungal - vfend. CXR and BCx repeated yesterday and negative 9/26 BCx remain negative. Repeated this morning. Cdiff negative. D/c vancomycin. Continue zosyn, acyclovir, voriconazole. Consult ID for additional recommendations.  Of note, gilteritinib can cause fevers in 13-35% of patients. Unclear timeline for febrile episodes? Will try to contact rep to discuss 9/27 Appreciate ID support. Changed to merrem. Given shortness of breath this morning, will proceed with CT CAP to eval for source. Could still be from drug vs disease as well 9/28 CT CAP negative for source of infection, PE. Did show trace pleural/pericardial effusion. Continue antibiotics. At this point, this may likely be drug or disease related fevers. Will hold gilteritinib and monitor fever curve 9/29 Gilteritinib remains on hold secondary to fevers 9/30 .8 overnight. Discussed with ID and okay to restart Dacogen, thorough workup and fevers improved overall. 10/1 Afebrile overnight and today thus far. Continuing on Merrem. 10/2 Remains afebrile. Merrem continues/ID following. Diarrhea 9/26 Cdiff negative. Add imodium/lomotil PRN Pancytopenia related to chemo/disease 9/23 hgb 6.5 transfuse per [facility] SOPs 
9/24 Hgb up to 7.4.  
9/28 PRBCs today 9/29 Plt today, lasix following transfusion 9/30 WBC 0.5, Hgb 7.7, Plt 45 k no transfusions today. 10/1 WBC 0.5, Hgb 7.4, Plt 25k no need for transfusions today. 10/2 WBC 0.4, Hgb 7.4, Plt 19k, no transfusions. Dyspnea 9/27 BNP elevated at 458. Check echo. DC fluids. Lasix x 1 
9/28 Repeat BNP tomorrow AM. Lasix after blood today 9/29 Dyspneic with exertion. Repeat lasix after platelets. Echo still pending. Repeat CXR. Noted crackles to left base 9/30 TORREZ improving slowly. Repeat Lasix 20 mg today. 10/1 TORREZ improving. Repeat Lasix today 20 mg x 1.   
10/2 TORREZ greatly improved. No further lasix today. Left shoulder ? Skin infection 10/2 Vanc started for redness/slight swelling/induration to left shoulder. Questionably related to fall but does not have the same appearance as bruise to right flank/side. Vanc started today. Goals and plan of care reviewed with the patient.   All questions answered to the best of our ability. Disposition:  Will remain inpatient for 10 days of Dacogen. Emilie Mon NP [facility] Hematology and Oncology [id number] [place] [address] Office : [phone] Fax : [phone] Attending Addendum: 
I have personally performed a face to face diagnostic evaluation on this patient. I have reviewed and agree with the care plan as documented above by Emilie Mon N.P.  My findings are as follows: Patient appears stable, heart rate regular without murmurs, abdomen is non-tender, bowel sounds are positive. 68 female, well-known to me for her history of AML, FLT3 ITD positive, failed induction with 7+3 with midostaurin, most recently on Dacogen plus Gilteritinib. Now admitted with neutropenic fevers, no clear infectious etiology. ID on board. Fevers felt to be likely leukemia related versus medications. Her Gilteritinib has now been on hold. Afebrile now. Recent BM biopsy with persistent residual AML: Discussed options, start Dacogen x10 days. Will reintroduce Gilteritinib a few days later. Continue regular labs with transfusions as needed. Recent CT CAP without any clear source of infection. Continue broad-spectrum antibiotics. Add iv vanco for LT shoulder area of erythema/infection. Total time 35 min 50% in direct consultation about the patient's diagnosis and management Rachel Ribera MD 
[facility] Hematology/Oncology [address] [address] Office : [phone] Fax : [phone]

## 2019-10-02 NOTE — PROGRESS NOTES
Infectious Disease Progress Note Today's Date: 10/2/2019 Admit Date: 2019 Impression: · Neutropenic fever; trended down over the past 72 hours. Unclear reason as to why. Meropenem? · No localizing symptoms, unclear etiology - drug reaction vs AML vs infection. CT negative. BC/Ucx negative to date. Mild diarrhea: C.diff negative. CMV, Fungitell, Galactomannan: negative · AML- dx 2019 - thus far not responding to treatment 58% blasts · Left shoulder lesion Plan:  
· Continue Merrem; duration to be determined · Continue ACV, Vfend · Dacogen to restarted · Follow Left shoulder erythematous lesion-appears worse than 2 days ago Anti-infectives: · Vanc - · Zosyn - · merrem  - current · Voriconazole - current · Acyclovir prophy Subjective:  
Resting in bed with significant other at bedside. She feels well with no fever/chills, left shoulder lesion not painful. No Known Allergies Review of Systems:  A comprehensive review of systems was negative except for that written in the History of Present Illness. Objective:  
 
Visit Vitals /55 (BP 1 Location: Right arm, BP Patient Position: Supine) Pulse 80 Temp 98.1 °F (36.7 °C) Resp 18 Ht 5' 6\" (1.676 m) Wt 88.7 kg (195 lb 8 oz) SpO2 93% Breastfeeding? No  
BMI 31.55 kg/m² Temp (24hrs), Av °F (36.7 °C), Min:97.7 °F (36.5 °C), Max:98.2 °F (36.8 °C) Lines:  L chest port c/d/i Physical Exam:   
General:  Alert, cooperative, sitting up in bed;  at bedside Eyes:  Sclera anicteric. Pupils equally round and reactive to light. Mouth/Throat: Mucous membranes normal, oral pharynx clear Neck: Supple Lungs:   Clear to auscultation bilaterally, good effort CV:  Regular rate and rhythm,no murmur, click, rub or gallop Abdomen:   Soft, non-tender. bowel sounds normal; non-distended Extremities: No cyanosis or edema Skin: Skin color, texture, turgor normal. no acute rash, left shoulder/deltoid area indurated erythematous lesion, not painful, but appears worse than 2 days ago Musculoskeletal: No swelling or deformity Lines/Devices:  Intact, no erythema, drainage or tenderness Psych: Alert and oriented, normal mood affect given the setting Data Review: CBC: 
Recent Labs 10/02/19 
4398 10/01/19 
0335 09/30/19 
7970 WBC 0.4* 0.5* 0.5* GRANS 5* 7*  --   
MONOS 3* 4  --   
EOS 0* 0*  --   
ANEU 0.0* 0.0*  -- ABL 0.4* 0.5  --   
HGB 7.4* 7.4* 7.7* HCT 22.3* 22.2* 22.9*  
PLT 19* 25* 45* BMP: 
Recent Labs 10/02/19 
1937 10/01/19 
0335 09/30/19 
6389 CREA 0.67 0.80 0.76 BUN 15 16 13  142 145  
K 4.2 3.9 3.8 * 109* 110* CO2 32 30 28 AGAP 4* 3* 7 GLU 91 91 89 LFTS: 
Recent Labs 10/02/19 
0586 10/01/19 
0335 09/30/19 
5408 TBILI 0.4 0.5 0.5 ALT 31 28 29 SGOT 26 28 32 AP 89 85 80  
TP 6.2* 6.1* 6.3 ALB 2.3* 2.3* 2.6* Microbiology:  
 
All Micro Results Procedure Component Value Units Date/Time CULTURE, BLOOD [[id number]] Collected:  09/26/19 0753 Order Status:  Completed Specimen:  Blood Updated:  10/01/19 2498 Special Requests: SINGLE PORT Culture result: NO GROWTH 5 DAYS     
 CULTURE, BLOOD [[id number]] Collected:  09/26/19 1050 Order Status:  Completed Specimen:  Blood Updated:  10/01/19 4807 Special Requests: --     
  LEFT 
FOREARM Culture result: NO GROWTH 5 DAYS     
 CULTURE, BLOOD [[id number]] Collected:  09/24/19 1536 Order Status:  Completed Specimen:  Blood Updated:  09/29/19 1107 Special Requests: LATERAL PORT Culture result: NO GROWTH 5 DAYS     
 CULTURE, BLOOD [[id number]] Collected:  09/24/19 1530 Order Status:  Completed Specimen:  Blood Updated:  09/29/19 1107 Special Requests: --     
  LEFT 
HAND Culture result: NO GROWTH 5 DAYS CMV BY PCR, QT [[id number]] Collected:  09/25/19 2016 Order Status:  Completed Specimen:  Blood from Plasma Updated:  09/28/19 2035 CMV IU/mL NEGATIVE  IU/mL Comment: (NOTE) No CMV DNA detected. The quantitative range of this assay is 200 to 1 million IU/mL. This test was developed and its performance characteristics 
determined by [facility]. It has not been cleared or approved by the 
Food and Drug Administration. The FDA has determined that such 
clearance or approval is not necessary. Performed At: [address] [id number] Cheko Maria MD NO:[id number] CMV log 10 IU/mL TEST NOT PERFORMED log10 IU/mL Comment: (NOTE) Unable to calculate result since non-numeric result obtained for 
component test. 
  
  
 C. DIFFICILE AG & TOXIN A/B [[id number]] Collected:  09/25/19 1703 Order Status:  Completed Specimen:  Stool Updated:  09/26/19 1203 [address] ANTIGEN    
  C. DIFFICILE GDH ANTIGEN-NEGATIVE  
     
  C. difficile toxin C. DIFFICILE TOXIN-NEGATIVE  
     
  PCR Reflex NOT APPLICABLE INTERPRETATION    
  NEGATIVE FOR TOXIGENIC C. DIFFICILE Clinical Consideration NEGATIVE FOR TOXIGENIC C. DIFFICILE  
     
 CULTURE, BLOOD [[id number]] Collected:  09/21/19 1903 Order Status:  Completed Specimen:  Blood Updated:  09/26/19 4465 Special Requests: --     
  LEFT Antecubital 
  
  Culture result: NO GROWTH 5 DAYS     
 CULTURE, BLOOD [[id number]] Collected:  09/21/19 1850 Order Status:  Completed Specimen:  Blood Updated:  09/26/19 1007 Special Requests: --     
  LEFT 
PORT Culture result: NO GROWTH 5 DAYS     
 CULTURE, URINE [[id number]] Collected:  09/21/19 2039 Order Status:  Completed Specimen:  Urine from Clean catch Updated:  09/24/19 0700 Special Requests: NO SPECIAL REQUESTS Culture result:    
  <10,000 COLONIES/mL MIXED SKIN REGGIE ISOLATED Imaging: CT chest/abd/pelv (9/27/19) IMPRESSION:  
1. Trace bilateral pleural effusions with a trace pericardial effusion. 2.  No other acute pathology identified. Other incidental findings as above. Signed By: Corazon Pereira NP October 2, 2019

## 2019-10-02 NOTE — PROGRESS NOTES
Problem: Falls - Risk of 
Goal: *Absence of Falls Description Document Mamie Mcarthur Fall Risk and appropriate interventions in the flowsheet. Outcome: Progressing Towards Goal 
Note:  
Fall Risk Interventions: 
Mobility Interventions: Communicate number of staff needed for ambulation/transfer, Patient to call before getting OOB Medication Interventions: Teach patient to arise slowly, Patient to call before getting OOB Elimination Interventions: Call light in reach, Patient to call for help with toileting needs History of Falls Interventions: Investigate reason for fall Problem: Patient Education: Go to Patient Education Activity Goal: Patient/Family Education Outcome: Progressing Towards Goal 
  
Problem: Body Temperature -  Risk of, Imbalanced Goal: *Absence of heat stress or hyperthermia signs and symptoms Outcome: Progressing Towards Goal 
  
Problem: Patient Education: Go to Patient Education Activity Goal: Patient/Family Education Outcome: Progressing Towards Goal 
  
Problem: Anemia Care Plan (Adult and Pediatric) Goal: *Labs within defined limits Outcome: Progressing Towards Goal 
Goal: *Tolerates increased activity Outcome: Progressing Towards Goal 
  
Problem: Patient Education: Go to Patient Education Activity Goal: Patient/Family Education Outcome: Progressing Towards Goal 
  
Problem: Risk for Spread of Infection Goal: Prevent transmission of infectious organism to others Description Prevent the transmission of infectious organisms to other patients, staff members, and visitors. Outcome: Progressing Towards Goal 
  
Problem: Patient Education:  Go to Education Activity Goal: Patient/Family Education Outcome: Progressing Towards Goal 
  
Problem: Nutrition Deficit Goal: *Optimize nutritional status Outcome: Progressing Towards Goal

## 2019-10-02 NOTE — PROGRESS NOTES
Rash on upper back, red raised bump, itchy. NP contacted. Will monitor and apply cortisone. If worsens ok to administer 50 mg of PO benadryl.

## 2019-10-02 NOTE — PROGRESS NOTES
Problem: Falls - Risk of 
Goal: *Absence of Falls Outcome: Progressing Towards Goal 
Note:  
Fall Risk Interventions: 
Mobility Interventions: Communicate number of staff needed for ambulation/transfer Medication Interventions: Teach patient to arise slowly Elimination Interventions: Call light in reach History of Falls Interventions: Investigate reason for fall

## 2019-10-02 NOTE — PROGRESS NOTES
Pharmacokinetic Consult to Pharmacist 
 
Le Alice is a 68 y.o. female being treated for febrile neutropenia with meropenem. Vancomycin has been added today for SSTI. Height: 5' 6\" (167.6 cm)  Weight: 88.7 kg (195 lb 8 oz) Lab Results Component Value Date/Time BUN 15 10/02/2019 02:52 AM  
 Creatinine 0.67 10/02/2019 02:52 AM  
 WBC 0.4 (LL) 10/02/2019 02:52 AM  
 Procalcitonin 0.1 09/21/2019 06:50 PM  
 Lactic Acid (POC) 0.67 09/21/2019 08:51 PM  
  
Estimated Creatinine Clearance: 80.3 mL/min (by C-G formula based on SCr of 0.67 mg/dL). Day 1 of vancomycin. Goal trough is 10-20. Vancomycin dose initiated at 2000 mg IV x 1 followed by 1250 mg IV q12h based on prior dosing history. Levels will be ordered as clinically indicated. Pharmacy will continue to follow. Please call with any questions. Thank you, Magdalena Fried, PharmD Clinical Pharmacist 
[phone]

## 2019-10-03 LAB
ALBUMIN SERPL-MCNC: 2.4 G/DL (ref 3.2–4.6)
ALBUMIN/GLOB SERPL: 0.6 {RATIO} (ref 1.2–3.5)
ALP SERPL-CCNC: 85 U/L (ref 50–136)
ALT SERPL-CCNC: 25 U/L (ref 12–65)
ANION GAP SERPL CALC-SCNC: 3 MMOL/L (ref 7–16)
AST SERPL-CCNC: 21 U/L (ref 15–37)
BILIRUB SERPL-MCNC: 0.4 MG/DL (ref 0.2–1.1)
BUN SERPL-MCNC: 18 MG/DL (ref 8–23)
CALCIUM SERPL-MCNC: 8.7 MG/DL (ref 8.3–10.4)
CHLORIDE SERPL-SCNC: 109 MMOL/L (ref 98–107)
CO2 SERPL-SCNC: 31 MMOL/L (ref 21–32)
CREAT SERPL-MCNC: 0.65 MG/DL (ref 0.6–1)
DIFFERENTIAL METHOD BLD: ABNORMAL
ERYTHROCYTE [DISTWIDTH] IN BLOOD BY AUTOMATED COUNT: 13.7 % (ref 11.9–14.6)
GLOBULIN SER CALC-MCNC: 3.8 G/DL (ref 2.3–3.5)
GLUCOSE SERPL-MCNC: 90 MG/DL (ref 65–100)
HCT VFR BLD AUTO: 21.5 % (ref 35.8–46.3)
HGB BLD-MCNC: 7.1 G/DL (ref 11.7–15.4)
MCH RBC QN AUTO: 29.8 PG (ref 26.1–32.9)
MCHC RBC AUTO-ENTMCNC: 33 G/DL (ref 31.4–35)
MCV RBC AUTO: 90.3 FL (ref 79.6–97.8)
NRBC # BLD: 0 K/UL (ref 0–0.2)
PLATELET # BLD AUTO: 14 K/UL (ref 150–450)
PLATELET COMMENTS,PCOM: ABNORMAL
PMV BLD AUTO: 11.8 FL (ref 9.4–12.3)
POTASSIUM SERPL-SCNC: 4.4 MMOL/L (ref 3.5–5.1)
PROT SERPL-MCNC: 6.2 G/DL (ref 6.3–8.2)
RBC # BLD AUTO: 2.38 M/UL (ref 4.05–5.2)
RBC MORPH BLD: ABNORMAL
SODIUM SERPL-SCNC: 143 MMOL/L (ref 136–145)
VANCOMYCIN TROUGH SERPL-MCNC: 17.6 UG/ML (ref 5–20)
WBC # BLD AUTO: 0.4 K/UL (ref 4.3–11.1)
WBC MORPH BLD: ABNORMAL

## 2019-10-03 PROCEDURE — 36430 TRANSFUSION BLD/BLD COMPNT: CPT

## 2019-10-03 PROCEDURE — 86923 COMPATIBILITY TEST ELECTRIC: CPT

## 2019-10-03 PROCEDURE — 80202 ASSAY OF VANCOMYCIN: CPT

## 2019-10-03 PROCEDURE — 99233 SBSQ HOSP IP/OBS HIGH 50: CPT | Performed by: INTERNAL MEDICINE

## 2019-10-03 PROCEDURE — 36591 DRAW BLOOD OFF VENOUS DEVICE: CPT

## 2019-10-03 PROCEDURE — 74011250636 HC RX REV CODE- 250/636: Performed by: NURSE PRACTITIONER

## 2019-10-03 PROCEDURE — 77030020256 HC SOL INJ NACL 0.9%  500ML

## 2019-10-03 PROCEDURE — 86900 BLOOD TYPING SEROLOGIC ABO: CPT

## 2019-10-03 PROCEDURE — 74011250636 HC RX REV CODE- 250/636: Performed by: INTERNAL MEDICINE

## 2019-10-03 PROCEDURE — 74011000258 HC RX REV CODE- 258: Performed by: INTERNAL MEDICINE

## 2019-10-03 PROCEDURE — 74011000258 HC RX REV CODE- 258: Performed by: NURSE PRACTITIONER

## 2019-10-03 PROCEDURE — 74011250637 HC RX REV CODE- 250/637: Performed by: NURSE PRACTITIONER

## 2019-10-03 PROCEDURE — P9040 RBC LEUKOREDUCED IRRADIATED: HCPCS

## 2019-10-03 PROCEDURE — 74011250637 HC RX REV CODE- 250/637: Performed by: INTERNAL MEDICINE

## 2019-10-03 PROCEDURE — 80053 COMPREHEN METABOLIC PANEL: CPT

## 2019-10-03 PROCEDURE — 85025 COMPLETE CBC W/AUTO DIFF WBC: CPT

## 2019-10-03 PROCEDURE — 86644 CMV ANTIBODY: CPT

## 2019-10-03 PROCEDURE — 65270000029 HC RM PRIVATE

## 2019-10-03 RX ORDER — NYSTATIN 100000 U/G
CREAM TOPICAL 2 TIMES DAILY
Status: DISCONTINUED | OUTPATIENT
Start: 2019-10-03 | End: 2019-10-10 | Stop reason: HOSPADM

## 2019-10-03 RX ORDER — FUROSEMIDE 10 MG/ML
20 INJECTION INTRAMUSCULAR; INTRAVENOUS ONCE
Status: COMPLETED | OUTPATIENT
Start: 2019-10-03 | End: 2019-10-03

## 2019-10-03 RX ORDER — SODIUM CHLORIDE 9 MG/ML
250 INJECTION, SOLUTION INTRAVENOUS AS NEEDED
Status: DISCONTINUED | OUTPATIENT
Start: 2019-10-03 | End: 2019-10-09 | Stop reason: SDUPTHER

## 2019-10-03 RX ADMIN — POTASSIUM CHLORIDE 20 MEQ: 20 TABLET, EXTENDED RELEASE ORAL at 17:00

## 2019-10-03 RX ADMIN — MEROPENEM 500 MG: 500 INJECTION, POWDER, FOR SOLUTION INTRAVENOUS at 19:54

## 2019-10-03 RX ADMIN — VANCOMYCIN HYDROCHLORIDE 1250 MG: 10 INJECTION, POWDER, LYOPHILIZED, FOR SOLUTION INTRAVENOUS at 09:55

## 2019-10-03 RX ADMIN — ACYCLOVIR 400 MG: 800 TABLET ORAL at 07:49

## 2019-10-03 RX ADMIN — PRAVASTATIN SODIUM 10 MG: 20 TABLET ORAL at 22:01

## 2019-10-03 RX ADMIN — VORICONAZOLE 200 MG: 200 TABLET, FILM COATED ORAL at 22:01

## 2019-10-03 RX ADMIN — MEROPENEM 500 MG: 500 INJECTION, POWDER, FOR SOLUTION INTRAVENOUS at 03:29

## 2019-10-03 RX ADMIN — ACETAMINOPHEN 650 MG: 325 TABLET, FILM COATED ORAL at 13:43

## 2019-10-03 RX ADMIN — DULOXETINE 30 MG: 30 CAPSULE, DELAYED RELEASE ORAL at 07:49

## 2019-10-03 RX ADMIN — MEROPENEM 500 MG: 500 INJECTION, POWDER, FOR SOLUTION INTRAVENOUS at 13:43

## 2019-10-03 RX ADMIN — NYSTATIN: 100000 CREAM TOPICAL at 19:52

## 2019-10-03 RX ADMIN — ONDANSETRON 8 MG: 2 INJECTION INTRAMUSCULAR; INTRAVENOUS at 13:43

## 2019-10-03 RX ADMIN — DIPHENHYDRAMINE HYDROCHLORIDE 25 MG: 25 CAPSULE ORAL at 13:43

## 2019-10-03 RX ADMIN — FUROSEMIDE 20 MG: 10 INJECTION, SOLUTION INTRAMUSCULAR; INTRAVENOUS at 10:42

## 2019-10-03 RX ADMIN — MEROPENEM 500 MG: 500 INJECTION, POWDER, FOR SOLUTION INTRAVENOUS at 07:54

## 2019-10-03 RX ADMIN — VANCOMYCIN HYDROCHLORIDE 1250 MG: 10 INJECTION, POWDER, LYOPHILIZED, FOR SOLUTION INTRAVENOUS at 22:27

## 2019-10-03 RX ADMIN — ACYCLOVIR 400 MG: 800 TABLET ORAL at 16:50

## 2019-10-03 RX ADMIN — LORAZEPAM 1 MG: 1 TABLET ORAL at 22:01

## 2019-10-03 RX ADMIN — DECITABINE 41 MG: 50 INJECTION, POWDER, LYOPHILIZED, FOR SOLUTION INTRAVENOUS at 16:34

## 2019-10-03 RX ADMIN — POTASSIUM CHLORIDE 20 MEQ: 20 TABLET, EXTENDED RELEASE ORAL at 07:49

## 2019-10-03 RX ADMIN — VORICONAZOLE 200 MG: 200 TABLET, FILM COATED ORAL at 07:49

## 2019-10-03 NOTE — PROGRESS NOTES
Problem: Falls - Risk of 
Goal: *Absence of Falls Description Document Marie Haynes Fall Risk and appropriate interventions in the flowsheet. Outcome: Progressing Towards Goal 
Note:  
Fall Risk Interventions: 
Mobility Interventions: Communicate number of staff needed for ambulation/transfer, Patient to call before getting OOB Medication Interventions: Teach patient to arise slowly Elimination Interventions: Call light in reach, Patient to call for help with toileting needs History of Falls Interventions: Investigate reason for fall Problem: Patient Education: Go to Patient Education Activity Goal: Patient/Family Education Outcome: Progressing Towards Goal 
  
Problem: Body Temperature -  Risk of, Imbalanced Goal: *Absence of heat stress or hyperthermia signs and symptoms Outcome: Progressing Towards Goal 
  
Problem: Patient Education: Go to Patient Education Activity Goal: Patient/Family Education Outcome: Progressing Towards Goal 
  
Problem: Anemia Care Plan (Adult and Pediatric) Goal: *Labs within defined limits Outcome: Progressing Towards Goal 
Goal: *Tolerates increased activity Outcome: Progressing Towards Goal 
  
Problem: Patient Education: Go to Patient Education Activity Goal: Patient/Family Education Outcome: Progressing Towards Goal 
  
Problem: Risk for Spread of Infection Goal: Prevent transmission of infectious organism to others Description Prevent the transmission of infectious organisms to other patients, staff members, and visitors. Outcome: Progressing Towards Goal 
  
Problem: Patient Education:  Go to Education Activity Goal: Patient/Family Education Outcome: Progressing Towards Goal 
  
Problem: Nutrition Deficit Goal: *Optimize nutritional status Outcome: Progressing Towards Goal

## 2019-10-03 NOTE — PROGRESS NOTES
Problem: Falls - Risk of 
Goal: *Absence of Falls Description Document Abel Perez Fall Risk and appropriate interventions in the flowsheet. Outcome: Progressing Towards Goal 
Note:  
Fall Risk Interventions: 
Mobility Interventions: Communicate number of staff needed for ambulation/transfer, Patient to call before getting OOB Medication Interventions: Teach patient to arise slowly Elimination Interventions: Call light in reach History of Falls Interventions: Investigate reason for fall

## 2019-10-03 NOTE — PROGRESS NOTES
END OF SHIFT NOTE: 
 
Intake/Output No intake/output data recorded. Voiding: YES Catheter: NO 
Drain:   
 
 
 
 
 
Stool:  1 occurrences. Stool Assessment Stool Color: Brown (09/28/19 0930) Stool Appearance: Soft; Formed (10/03/19 5101) Stool Amount: Small (09/28/19 0930) Stool Source/Status: Rectum (09/28/19 0930) Emesis:  0 occurrences. VITAL SIGNS Patient Vitals for the past 12 hrs: 
 Temp Pulse Resp BP SpO2  
10/03/19 1820 98.1 °F (36.7 °C) 84 18 113/71 94 % 10/03/19 1626 98 °F (36.7 °C) 73 18 135/61 98 % 10/03/19 1458 98 °F (36.7 °C) 70 17 147/69 97 % 10/03/19 1440 98 °F (36.7 °C) 71 19 127/68 96 % 10/03/19 1201 98.1 °F (36.7 °C) 80 18 137/76 97 % 10/03/19 0747 98.2 °F (36.8 °C) 73 16 138/59 95 % Pain Assessment Pain 1 Pain Scale 1: Numeric (0 - 10) (10/03/19 0747) Pain Intensity 1: 0 (10/03/19 0747) Patient Stated Pain Goal: 0 (10/03/19 0747) Pain Reassessment 1: Patient resting w/respiratory rate greater than 10 (09/29/19 0235) Pain Onset 1: 20 minutes (09/23/19 0334) Pain Location 1: Head (09/23/19 0334) Pain Orientation 1: Anterior (09/23/19 0334) Pain Description 1: Dull (09/23/19 0334) Pain Intervention(s) 1: Medication (see MAR) (09/23/19 0334) Ambulating Yes Additional Information: Pt resting in chair with  at bedside. Dacogen this shift, tolerated well. Pt received benadryl and tylenol for premedication of PRBC infusing. Pt tolerated infusion well. Pt has rash back, arm, abdomen, and shoulder. Ordered Nystatin per MD. No other needs at this time. Shift report given to oncoming nurse at the bedside. Lisa Cleveland

## 2019-10-03 NOTE — PROGRESS NOTES
END OF SHIFT NOTE: 
 
Intake/Output 10/02 1901 - 10/03 0700 In: 502 [I.V.:502] Out: 1800 [Urine:1800] Voiding: YES Catheter: NO 
Drain:   
 
 
 
 
 
Stool:  0 occurrences. Stool Assessment Stool Color: Brown (09/28/19 0930) Stool Appearance: Loose (10/02/19 0839) Stool Amount: Small (09/28/19 0930) Stool Source/Status: Rectum (09/28/19 0930) Emesis:  0 occurrences. VITAL SIGNS Patient Vitals for the past 12 hrs: 
 Temp Pulse Resp BP SpO2  
10/03/19 0329 97.6 °F (36.4 °C) 77 18 145/72 94 % 10/03/19 0042 98.1 °F (36.7 °C) 79 18 155/67 93 % 10/02/19 1915 98.2 °F (36.8 °C) 75 18 150/64 96 % Pain Assessment Pain 1 Pain Scale 1: Numeric (0 - 10) (10/03/19 0335) Pain Intensity 1: 0 (10/03/19 0335) Patient Stated Pain Goal: 0 (10/03/19 0335) Pain Reassessment 1: Patient resting w/respiratory rate greater than 10 (09/29/19 0235) Pain Onset 1: 20 minutes (09/23/19 0334) Pain Location 1: Head (09/23/19 0334) Pain Orientation 1: Anterior (09/23/19 0334) Pain Description 1: Dull (09/23/19 0334) Pain Intervention(s) 1: Medication (see MAR) (09/23/19 0334) Ambulating Yes Additional Information: Patient rested well. Complained of welps where her bruise is at on her side and she said they itch. Given benadryl. VSS. No needs voiced. Shift report given to oncoming nurse at the bedside. Neida Persaud

## 2019-10-03 NOTE — PROGRESS NOTES
Nutrition Follow-up Reason for initial assessment LOS Note: day 5 Assessment DIET REGULAR Food,Nutrition, and Pertinent History: Patient seen after lunch. She is is good spirits and states that her appetite is good. Observed lunch tray and 100% consumed. She states that she has been eating well and voices no complaints. She remains inpatient day 3 Dacogen. Started on vancomycin for possible skin infection. Anthropometrics: Height: 5' 6\" (167.6 cm), Weight Source: Standing scale (comment), Weight: 91.6 kg (202 lb), Body mass index is 32.6 kg/m². BMI class of overweight for age. Weight and BMI 10/2/2019 10/1/2019 9/30/2019 9/29/2019 Weight 89.313 kg 88.678 kg 88.905 kg 90.175 kg BMI (calculated) 31.8 kg/m2 31.6 kg/m2 31.7 kg/m2 32.1 kg/m2 Weight and BMI 9/28/2019 9/27/2019 Weight 89.404 kg 89.404 kg BMI (calculated) 31.8 kg/m2 31.8 kg/m2 Edema: trace to BLEs Macronutrient Needs: (88.1 kg -  9/21 standing scale) · EER:  8088-0226 kcal /day (15-20 kcal/kg listed BW) · EPR:   grams protein/day (1-1.2 grams/kg listed BW) Intake/Comparative Standards: Average intake for past 5 day(s)/14 recorded meal(s): 99%. This potentially meets ~100% of kcal and ~100% of protein needs 
  
Nutrition Diagnosis: No nutrition diagnosis at this time 
  
Intervention:  
Meals and snacks: Continue current diet. Discharge nutrition plan: Continue current diet.  
 
[address] [facility] [address] [place] [address], [address], [address] [facility]

## 2019-10-03 NOTE — PROGRESS NOTES
Infectious Disease Progress Note Today's Date: 10/3/2019 Admit Date: 2019 Impression: · Neutropenic fever; trended down over the past 72 hours. Unclear reason as to why. Meropenem? · No localizing symptoms, unclear etiology - drug reaction vs AML vs infection. CT negative. BC/Ucx negative to date. Mild diarrhea: C.diff negative. 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 Plan:  
· Continue Merrem; duration to be determined, but if she is still doing well at 14 days, then I will deescalate antibacterial prophylaxis · Continue ACV, Vfend · Dacogen was restarted with plans to restart xospata Anti-infectives: · Vanc - · Zosyn - · merrem  - current · Voriconazole - current · Acyclovir prophylaxis Subjective:  
Resting in bed with significant other at bedside. She feels well with no fever/chills, left shoulder lesion not painful. No Known Allergies Review of Systems:  A comprehensive review of systems was negative except for that written in the History of Present Illness. Objective:  
 
Visit Vitals /76 (BP 1 Location: Right arm) Pulse 80 Temp 98.1 °F (36.7 °C) Resp 18 Ht 5' 6\" (1.676 m) Wt 89.3 kg (196 lb 14.4 oz) SpO2 97% Breastfeeding? No  
BMI 31.78 kg/m² Temp (24hrs), Av.1 °F (36.7 °C), Min:97.6 °F (36.4 °C), Max:98.2 °F (36.8 °C) Lines:  L chest port c/d/i Physical Exam:   
General:  Alert, cooperative, sitting on the side of the bed and finishing lunch Eyes:  Sclera anicteric. Pupils equally round and reactive to light. Mouth/Throat: Mucous membranes normal, oral pharynx clear Neck: Supple Lungs:   Clear to auscultation bilaterally, good effort CV:  Regular rate and rhythm, no murmur, click, rub or gallop Abdomen:   Soft, non-tender. bowel sounds normal; non-distended Extremities: No cyanosis or edema Skin: Skin color, texture, turgor normal. no acute rash, left shoulder/deltoid area indurated erythematous lesion, not painful, but appears worse than 2 days ago Musculoskeletal: No swelling or deformity Lines/Devices:  Intact, no erythema, drainage or tenderness Psych: Alert and oriented, normal mood affect given the setting Data Review: CBC: 
Recent Labs 10/03/19 
9726 10/02/19 
0252 10/01/19 
0335 WBC 0.4* 0.4* 0.5* GRANS  --  5* 7*  
MONOS  --  3* 4  
EOS  --  0* 0* ANEU  --  0.0* 0.0* ABL  --  0.4* 0.5 HGB 7.1* 7.4* 7.4* HCT 21.5* 22.3* 22.2*  
PLT 14* 19* 25* BMP: 
Recent Labs 10/03/19 
5360 10/02/19 
0252 10/01/19 
0335 CREA 0.65 0.67 0.80 BUN 18 15 16  145 142  
K 4.4 4.2 3.9 * 109* 109* CO2 31 32 30 AGAP 3* 4* 3*  
GLU 90 91 91 LFTS: 
Recent Labs 10/03/19 
4803 10/02/19 
0252 10/01/19 
0335 TBILI 0.4 0.4 0.5 ALT 25 31 28 SGOT 21 26 28 AP 85 89 85  
TP 6.2* 6.2* 6.1* ALB 2.4* 2.3* 2.3* Microbiology:  
 
All Micro Results Procedure Component Value Units Date/Time CULTURE, BLOOD [[id number]] Collected:  09/26/19 0753 Order Status:  Completed Specimen:  Blood Updated:  10/01/19 1102 Special Requests: SINGLE PORT Culture result: NO GROWTH 5 DAYS     
 CULTURE, BLOOD [[id number]] Collected:  09/26/19 1050 Order Status:  Completed Specimen:  Blood Updated:  10/01/19 4142 Special Requests: --     
  LEFT 
FOREARM Culture result: NO GROWTH 5 DAYS     
 CULTURE, BLOOD [[id number]] Collected:  09/24/19 1536 Order Status:  Completed Specimen:  Blood Updated:  09/29/19 1107 Special Requests: LATERAL PORT Culture result: NO GROWTH 5 DAYS     
 CULTURE, BLOOD [[id number]] Collected:  09/24/19 1530 Order Status:  Completed Specimen:  Blood Updated:  09/29/19 1107 Special Requests: --     
  LEFT 
HAND Culture result: NO GROWTH 5 DAYS CMV BY PCR, QT [[id number]] Collected:  09/25/19 2016 Order Status:  Completed Specimen:  Blood from Plasma Updated:  09/28/19 2035 CMV IU/mL NEGATIVE  IU/mL Comment: (NOTE) No CMV DNA detected. The quantitative range of this assay is 200 to 1 million IU/mL. This test was developed and its performance characteristics 
determined by [facility]. It has not been cleared or approved by the 
Food and Drug Administration. The FDA has determined that such 
clearance or approval is not necessary. Performed At: [address] [id number] Marques Sharma MD IR:[id number] CMV log 10 IU/mL TEST NOT PERFORMED log10 IU/mL Comment: (NOTE) Unable to calculate result since non-numeric result obtained for 
component test. 
  
  
 C. DIFFICILE AG & TOXIN A/B [[id number]] Collected:  09/25/19 1703 Order Status:  Completed Specimen:  Stool Updated:  09/26/19 1203 [id number] [place] ANTIGEN    
  C. DIFFICILE GDH ANTIGEN-NEGATIVE  
     
  C. difficile toxin C. DIFFICILE TOXIN-NEGATIVE  
     
  PCR Reflex NOT APPLICABLE INTERPRETATION    
  NEGATIVE FOR TOXIGENIC C. DIFFICILE Clinical Consideration NEGATIVE FOR TOXIGENIC C. DIFFICILE  
     
 CULTURE, BLOOD [[id number]] Collected:  09/21/19 1903 Order Status:  Completed Specimen:  Blood Updated:  09/26/19 1109 Special Requests: --     
  LEFT Antecubital 
  
  Culture result: NO GROWTH 5 DAYS     
 CULTURE, BLOOD [[id number]] Collected:  09/21/19 1850 Order Status:  Completed Specimen:  Blood Updated:  09/26/19 5848 Special Requests: --     
  LEFT 
PORT Culture result: NO GROWTH 5 DAYS     
 CULTURE, URINE [[id number]] Collected:  09/21/19 2039 Order Status:  Completed Specimen:  Urine from Clean catch Updated:  09/24/19 0700 Special Requests: NO SPECIAL REQUESTS Culture result:    
  <10,000 COLONIES/mL MIXED SKIN REGGIE ISOLATED Imaging: CT chest/abd/pelv (9/27/19) IMPRESSION:  
1. Trace bilateral pleural effusions with a trace pericardial effusion. 2.  No other acute pathology identified. Other incidental findings as above. Signed By: Shelby Santana MD   
 October 3, 2019

## 2019-10-03 NOTE — PROGRESS NOTES
[facility] Inpatient Hematology / Oncology Progress Note Admission Date: 2019  6:30 PM 
Reason for Admission/Hospital Course: Febrile neutropenia ([address] [D70.9, R50.81] 24 Hour Events: 
Afebrile now, vitals stable Erythema to right groin, macular papular rash to low back, erythema to left shoulder ongoing Continue merrem/vancomycin (added to cover skin infection of left shoulder) /acyclovir/vfend D3 of Dacogen - gilteritinib remains on hold ROS: 
Constitutional: negative for fever, chills, weakness, malaise, fatigue. CV:  negative for chest pain, palpitations, edema. Respiratory:  negative for dyspnea, cough, wheezing. GI: negative for nausea, abdominal pain. 10 point review of systems is otherwise negative with the exception of the elements mentioned above in the HPI. No Known Allergies OBJECTIVE: 
Patient Vitals for the past 8 hrs: 
 BP Temp Pulse Resp SpO2  
10/03/19 1458 147/69 98 °F (36.7 °C) 70 17 97 % 10/03/19 1440 127/68 98 °F (36.7 °C) 71 19 96 % 10/03/19 1201 137/76 98.1 °F (36.7 °C) 80 18 97 % Temp (24hrs), Av °F (36.7 °C), Min:97.6 °F (36.4 °C), Max:98.2 °F (36.8 °C) 
 
10/03 0701 - 10/03 1900 In: 600 [P.O.:600] Out: 700 [Urine:700] Physical Exam: 
Constitutional: Well developed, well nourished female in no acute distress, lying in hospital bed. HEENT: Normocephalic and atraumatic. Oropharynx is clear, mucous membranes are moist.  Neck supple. Patchy alopecia. Skin +large bruise to right flank from previous fall, red indurated area to left upper posterior shoulder, slightly smaller per MD but still very erythematous. Warm and dry. + pallor Respiratory Lungs clear, normal air exchange without accessory muscle use. TORREZ greatly improved. CVS Normal rate, regular rhythm and normal S1 and S2. No murmurs, gallops, or rubs. Abdomen Soft, nontender and nondistended, normoactive bowel sounds.   No palpable mass. No hepatosplenomegaly. Neuro Grossly nonfocal with no obvious sensory or motor deficits. MSK Normal range of motion in general. Trace BLE edema and no tenderness. Psych Appropriate mood and affect. Labs: 
   
Recent Labs 10/03/19 
1210 10/02/19 
0252 10/01/19 
0335 WBC 0.4* 0.4* 0.5* RBC 2.38* 2.50* 2.51* HGB 7.1* 7.4* 7.4* HCT 21.5* 22.3* 22.2*  
MCV 90.3 89.2 88.4 MCH 29.8 29.6 29.5 MCHC 33.0 33.2 33.3 RDW 13.7 13.9 14.0  
PLT 14* 19* 25* GRANS  --  5* 7*  
LYMPH  --  92* 89* MONOS  --  3* 4  
EOS  --  0* 0*  
BASOS  --  0 0 IG  --  0 0  
DF MANUAL AUTOMATED AUTOMATED ANEU  --  0.0* 0.0* ABL  --  0.4* 0.5 ABM  --  0.0* 0.0* ALEKSANDR  --  0.0 0.0 ABB  --  0.0 0.0 AIG  --  0.0 0.0 Recent Labs 10/03/19 
0365 10/02/19 
0252 10/01/19 
0335  145 142  
K 4.4 4.2 3.9 * 109* 109* CO2 31 32 30 AGAP 3* 4* 3*  
GLU 90 91 91 BUN 18 15 16 CREA 0.65 0.67 0.80 GFRAA >60 >60 >60 GFRNA >60 >60 >60  
CA 8.7 8.4 8.4 SGOT 21 26 28 AP 85 89 85  
TP 6.2* 6.2* 6.1* ALB 2.4* 2.3* 2.3*  
GLOB 3.8* 3.9* 3.8* AGRAT 0.6* 0.6* 0.6* Imaging: XR CHEST SNGL V [[id number]] Collected: 09/29/19 1217 Order Status: Completed Updated: 09/29/19 1220 Narrative:    
Portable chest x-ray CLINICAL INDICATION: Crackles on physical exam 
 
FINDINGS: Single AP view the chest compared to a similar exam dated 9/24/2019 
shows the lungs to be slightly underexpanded. No airspace consolidation noted. No jens pulmonary edema. The cardiac silhouette and mediastinum are stable. A 
left-sided chest port is in place. Impression:    
IMPRESSION: Slightly underexpanded lungs, otherwise no acute abnormality. CT CHEST ABD PELV W CONT [[id number]] Collected: 09/27/19 1632 Order Status: Completed Updated: 09/27/19 1641 Narrative:    
CT CHEST ABDOMEN AND PELVIS WITH CONTRAST HISTORY: fevers, SOB, new O2, 73 years Female  Neutropenic fever Possible infection Leukemia COMPARISON: Chest radiograph September 24, 2019 TECHNIQUE:  Oral contrast was administered.  100 cc of nonionic intravenous 
contrast was injected, and axial helical CT images were obtained from the 
thoracic inlet through the pelvis. Coronal reformatted images were obtained at 
the scanner console and made available for review.  Radiation dose reduction 
techniques were used for this study:  Our CT scanners use one or all of the 
following: Automated exposure control, adjustment of the mA and/or kVp according 
to patient's size, iterative reconstruction. FINDINGS: 
 
CHEST: 
Partially visualized thyroid unremarkable.  No evidence of significant 
mediastinal, hilar, or axillary lymphadenopathy.  Minimal calcific 
atherosclerosis of a normal caliber aortic arch and descending aorta.  Left 
chest wall nelli catheter appears to remain in anatomic position.  Trace 
pericardial effusion.  Trace bilateral pleural effusions with associated mild 
dependent subsegmental atelectasis bilateral lung bases.  Visualized proximal 
airways unremarkable. ABDOMEN: 
Normal-appearing liver, gallbladder, spleen, pancreas, bilateral adrenal glands. 
 Small simple appearing right renal interpolar region cortical cyst measuring 2.4 cm in diameter.  Small nonobstructing right renal medullary level calculus 
measuring 2 mm in diameter.  Subcentimeter left renal cortical hypoenhancing 
lesions which are too small to characterize but probably cysts.  Mild calcific 
atherosclerosis of a normal caliber abdominal aorta.  No evidence of significant 
lymphadenopathy.  Normal-appearing small bowel.  No evidence of intraperitoneal 
free air or free fluid.  Image quality somewhat degraded by respiratory motion 
artifact.  
 
PELVIS: 
Normal-appearing urinary bladder.  Atrophic appearing uterus and bilateral 
adnexa with a coarse calcification of the uterine fundus which may represent a 
 calcified fibroid.   Normal-appearing colon and partially visualized appendix. Trace pelvic free fluid, may be physiologic.  No evidence of significant 
inguinal or pelvic sidewall lymphadenopathy.  Visualized osseous structures 
unremarkable.    
Impression:    
IMPRESSION: 1.  Trace bilateral pleural effusions with a trace pericardial effusion. 2.  No other acute pathology identified.  Other incidental findings as above. XR CHEST PA LAT [[id number]] Collected: 09/24/19 1617 Order Status: Completed Updated: 09/24/19 1620 Narrative:    
CHEST X-RAY, 2 views 9/24/2019 History: Tachypnea and fever. Technique: PA and lateral views of the chest.  
 
Comparison: Chest x-ray 9/21/2019 Findings: A stable left-sided venous port is seen. The cardiac silhouette is mildly 
enlarged although stable.  The lungs are expanded without evidence for 
pneumothorax.  No evolving consolidation, or evidence of pleural effusion is 
seen. The bony thorax demonstrates no acute changes.  The upper abdomen is 
unremarkable in appearance. Impression:    
IMPRESSION:  
1.  Stable cardiomegaly without evolving acute changes evident by plain film 
imaging. XR CHEST PA LAT [[id number]] Collected: 09/21/19 2042 Order Status: Completed Updated: 09/21/19 2047 Narrative:    
EXAM: Chest x-ray. INDICATION: Febrile neutropenia. COMPARISON: May 18, 2019. TECHNIQUE: Frontal and lateral x-rays of the chest were obtained. FINDINGS: The lungs are clear except for minimal linear atelectasis or scarring 
in the lateral left costophrenic angle. The cardiac size, mediastinal contour 
and pulmonary vasculature are within normal limits. No pneumothorax or pleural 
effusion is seen. A left chest wall infusion port catheter remains in place. Impression:    
IMPRESSION: No acute process. Medications: 
Current Facility-Administered Medications Medication Dose Route Frequency  0.9% sodium chloride infusion 250 mL  250 mL IntraVENous PRN  Vancomycin trough reminder   Other ONCE  
 vancomycin (VANCOCIN) 1250 mg in  ml infusion  1,250 mg IntraVENous Q12H  calcium carbonate (TUMS) chewable tablet 200 mg [elemental]  200 mg Oral TID PRN  
 ondansetron (ZOFRAN) injection 8 mg  8 mg IntraVENous Q24H  
 decitabine (DACOGEN) 41 mg in 0.9% sodium chloride 100 mL chemo infusion  41 mg IntraVENous Q24H  
 diphenhydrAMINE (BENADRYL) capsule 25 mg  25 mg Oral Q6H PRN  
 loperamide (IMODIUM) capsule 2 mg  2 mg Oral Q4H PRN  
 diphenoxylate-atropine (LOMOTIL) tablet 2 Tab  2 Tab Oral QID PRN  
 meropenem (MERREM) 500 mg in 0.9% sodium chloride (MBP/ADV) 50 mL  0.5 g IntraVENous Q6H  
 voriconazole (VFEND) tablet 200 mg  200 mg Oral Q12H  potassium chloride (K-DUR, KLOR-CON) SR tablet 20 mEq  20 mEq Oral BID  polyethylene glycol (MIRALAX) packet 17 g  17 g Oral DAILY PRN  
 acetaminophen (TYLENOL) tablet 650 mg  650 mg Oral Q6H PRN  
 [Held by provider] gilteritinib tab (Xospata) 120 mg = 3 tabs  (Patient Supplied)  120 mg Oral PCL  sodium chloride (NS) flush 5-10 mL  5-10 mL IntraVENous PRN  
 DULoxetine (CYMBALTA) capsule 30 mg  30 mg Oral DAILY  LORazepam (ATIVAN) tablet 1 mg  1 mg Oral QHS  pravastatin (PRAVACHOL) tablet 10 mg  10 mg Oral QHS  zolpidem (AMBIEN) tablet 5 mg  5 mg Oral QHS PRN  
 acyclovir (ZOVIRAX) tablet 400 mg  400 mg Oral BID  influenza vaccine 2019-20 (6 mos+)(PF) (FLUARIX/FLULAVAL/FLUZONE QUAD) injection 0.5 mL  0.5 mL IntraMUSCular PRIOR TO DISCHARGE ASSESSMENT: 
 
Problem List  Date Reviewed: 9/19/2019 Codes Class Noted * (Principal) Febrile neutropenia (HCC) ICD-10-CM: D70.9, R50.81 ICD-9-CM: 288.00, 780.61  9/21/2019 Pancytopenia due to antineoplastic chemotherapy [facility]) ICD-10-CM: D61.810, T45.1X5A 
ICD-9-CM: 284.11, E933.1  6/12/2019 Cellulitis of neck ICD-10-CM: P90.375 ICD-9-CM: 682.1  6/12/2019 Immunocompromised status associated with infection ICD-10-CM: B99.9 ICD-9-CM: 136.9  6/12/2019 Port or reservoir infection ICD-10-CM: W56.729I ICD-9-CM: 999.33  6/12/2019 Acute myeloid leukemia not having achieved remission ([facility] ICD-10-CM: C92.00 ICD-9-CM: 205.00  5/9/2019 Admission for antineoplastic chemotherapy ICD-10-CM: Z51.11 ICD-9-CM: V58.11  5/5/2019 AML (acute myeloblastic leukemia) ([facility] ICD-10-CM: C92.00 ICD-9-CM: 205.00  4/28/2019 Weakness generalized ICD-10-CM: R53.1 ICD-9-CM: 780.79  4/28/2019 Pancytopenia ([facility] ICD-10-CM: Q18.991 ICD-9-CM: 284.19  4/28/2019 Thrombocytopenia ([facility] ICD-10-CM: D69.6 ICD-9-CM: 287.5  4/27/2019 Ms. Luna Doshi is a 68year old female who was admitted on 9/21/2019 for neutropenic fever. She has been having intermittent low grade fevers over the last week. She came to infusion yesterday for a blood transfusion, and after going home she had a fever of 102.5 and was sent to the ER for evaluation. She is a known patient of Dr. Alejandra Rothman with AML. Initially treated with induction 7+3 and Midostaurin. Most recently on Decitabine plus Gilteritinib with cycle 2 beginning on 8/26/2019 and cycle 3 due 9/23/2019. CXR negative. UA clear. BC/UC NTD. Started on zosyn and vanc. PLAN: 
AML 
-On Dacogen/Gilteritinib with most recent cycle 2 on 8/26/2019 with cycle 3 due 9/23/2019 9/23 Discuss BMbx flow results with patient-persistent AML-58% blasts. Still waiting on final pathology. 9/24 Bone marrow biopsy final path still pending. 9/30 Plan to restart Dacogen while inpatient for 10 days. 10/1 Dacogen to restart today. Plan to start Gilteritinib on D5 if she remains afebrile. 10/2 Day 2 of Dacogen. Tolerating well.   
  
Neutropenic Fever 
-UA clear, BC/UC NTD 
-On Zosyn/Vanc empirically 9/23 Tmax 100.8. Feeling better today. Day 2 zosyn and vanc.  BC/UC NTD 
 9/24 Tmax 100.6. Day 3 vanc/zosyn. BC/UC still NGTD.  
9/25 Check aspergillus, fungitell, CMV. Add antifungal - vfend. CXR and BCx repeated yesterday and negative 9/26 BCx remain negative. Repeated this morning. Cdiff negative. D/c vancomycin. Continue zosyn, acyclovir, voriconazole. Consult ID for additional recommendations. Of note, gilteritinib can cause fevers in 13-35% of patients. Unclear timeline for febrile episodes? Will try to contact rep to discuss 9/27 Appreciate ID support. Changed to merrem. Given shortness of breath this morning, will proceed with CT CAP to eval for source. Could still be from drug vs disease as well 9/28 CT CAP negative for source of infection, PE. Did show trace pleural/pericardial effusion. Continue antibiotics. At this point, this may likely be drug or disease related fevers. Will hold gilteritinib and monitor fever curve 9/29 Gilteritinib remains on hold secondary to fevers 9/30 .8 overnight. Discussed with ID and okay to restart Dacogen, thorough workup and fevers improved overall. 10/1 Afebrile overnight and today thus far. Continuing on Merrem. 10/2 Remains afebrile. Merrem continues/ID following. Diarrhea 9/26 Cdiff negative. Add imodium/lomotil PRN Pancytopenia related to chemo/disease 9/23 hgb 6.5 transfuse per [facility] SOPs 
9/24 Hgb up to 7.4.  
9/28 PRBCs today 9/29 Plt today, lasix following transfusion 9/30 WBC 0.5, Hgb 7.7, Plt 45 k no transfusions today. 10/1 WBC 0.5, Hgb 7.4, Plt 25k no need for transfusions today. 10/2 WBC 0.4, Hgb 7.4, Plt 19k, no transfusions. Dyspnea 9/27 BNP elevated at 458. Check echo. DC fluids. Lasix x 1 
9/28 Repeat BNP tomorrow AM. Lasix after blood today 9/29 Dyspneic with exertion. Repeat lasix after platelets. Echo still pending. Repeat CXR. Noted crackles to left base 9/30 TORREZ improving slowly. Repeat Lasix 20 mg today. 10/1 TORREZ improving. Repeat Lasix today 20 mg x 1. 10/2 TORREZ greatly improved. No further lasix today. Left shoulder ? Skin infection 10/2 Vanc started for redness/slight swelling/induration to left shoulder. Questionably related to fall but does not have the same appearance as bruise to right flank/side. Vanc started today. Goals and plan of care reviewed with the patient. All questions answered to the best of our ability. Disposition:  Will remain inpatient for 10 days of Dacogen. Changes to plan of care 10/3: No change to antibiotics. Transfuse PRBCs given syncope with anemia previously. ID following. Nystatin to right groin and topical hydrocortisone to back. Counts remain low. Continue day 3 dacogen Nonda Dubin, NP [facility] [address] [place] [address] Office : [phone] Fax : [phone] Attending Addendum: 
I have personally performed a face to face diagnostic evaluation on this patient. I have reviewed and agree with the care plan as documented above by Dunia Bradshaw N.P.  My findings are as follows: Patient appears stable, heart rate regular without murmurs, abdomen is non-tender, bowel sounds are positive. 68 female, well-known to me for her history of AML, FLT3 ITD positive, failed induction with 7+3 with midostaurin, most recently on Dacogen plus Gilteritinib. Now admitted with neutropenic fevers, no clear infectious etiology. ID on board. Fevers felt to be likely leukemia related versus medications. Her Gilteritinib has now been on hold. Afebrile now. Recent BM biopsy with persistent residual AML: Discussed options, started Dacogen x10 days. Will reintroduce Gilteritinib a few days later. Continue regular labs with transfusions as needed. Recent CT CAP without any clear source of infection. Continue broad-spectrum antibiotics. Add iv vanco for LT shoulder area of erythema/infection: better today. Total time 35 min 50% in direct consultation about the patient's diagnosis and management Benjamin Brooks MD 
[facility] Hematology/Oncology [address] [place] [address] Office : [phone] Fax : [phone]

## 2019-10-04 LAB
ABO + RH BLD: NORMAL
ALBUMIN SERPL-MCNC: 2.5 G/DL (ref 3.2–4.6)
ALBUMIN/GLOB SERPL: 0.7 {RATIO} (ref 1.2–3.5)
ALP SERPL-CCNC: 91 U/L (ref 50–136)
ALT SERPL-CCNC: 26 U/L (ref 12–65)
ANION GAP SERPL CALC-SCNC: 3 MMOL/L (ref 7–16)
AST SERPL-CCNC: 21 U/L (ref 15–37)
BILIRUB SERPL-MCNC: 0.5 MG/DL (ref 0.2–1.1)
BLD PROD TYP BPU: NORMAL
BLOOD GROUP ANTIBODIES SERPL: NORMAL
BPU ID: NORMAL
BUN SERPL-MCNC: 19 MG/DL (ref 8–23)
CALCIUM SERPL-MCNC: 8.6 MG/DL (ref 8.3–10.4)
CHLORIDE SERPL-SCNC: 108 MMOL/L (ref 98–107)
CO2 SERPL-SCNC: 32 MMOL/L (ref 21–32)
CREAT SERPL-MCNC: 0.71 MG/DL (ref 0.6–1)
CROSSMATCH RESULT,%XM: NORMAL
DIFFERENTIAL METHOD BLD: ABNORMAL
ERYTHROCYTE [DISTWIDTH] IN BLOOD BY AUTOMATED COUNT: 13.3 % (ref 11.9–14.6)
GLOBULIN SER CALC-MCNC: 3.7 G/DL (ref 2.3–3.5)
GLUCOSE SERPL-MCNC: 93 MG/DL (ref 65–100)
HCT VFR BLD AUTO: 24 % (ref 35.8–46.3)
HGB BLD-MCNC: 8 G/DL (ref 11.7–15.4)
MCH RBC QN AUTO: 29.6 PG (ref 26.1–32.9)
MCHC RBC AUTO-ENTMCNC: 33.3 G/DL (ref 31.4–35)
MCV RBC AUTO: 88.9 FL (ref 79.6–97.8)
NRBC # BLD: 0 K/UL (ref 0–0.2)
PLATELET # BLD AUTO: 6 K/UL (ref 150–450)
PLATELET COMMENTS,PCOM: ABNORMAL
PMV BLD AUTO: 8.9 FL (ref 9.4–12.3)
POTASSIUM SERPL-SCNC: 4.4 MMOL/L (ref 3.5–5.1)
PROT SERPL-MCNC: 6.2 G/DL (ref 6.3–8.2)
RBC # BLD AUTO: 2.7 M/UL (ref 4.05–5.2)
RBC MORPH BLD: ABNORMAL
SODIUM SERPL-SCNC: 143 MMOL/L (ref 136–145)
SPECIMEN EXP DATE BLD: NORMAL
STATUS OF UNIT,%ST: NORMAL
UNIT DIVISION, %UDIV: 0
WBC # BLD AUTO: 0.4 K/UL (ref 4.3–11.1)
WBC MORPH BLD: ABNORMAL

## 2019-10-04 PROCEDURE — 77030020256 HC SOL INJ NACL 0.9%  500ML

## 2019-10-04 PROCEDURE — 74011250636 HC RX REV CODE- 250/636: Performed by: INTERNAL MEDICINE

## 2019-10-04 PROCEDURE — 99233 SBSQ HOSP IP/OBS HIGH 50: CPT | Performed by: INTERNAL MEDICINE

## 2019-10-04 PROCEDURE — 74011250637 HC RX REV CODE- 250/637: Performed by: INTERNAL MEDICINE

## 2019-10-04 PROCEDURE — 74011250637 HC RX REV CODE- 250/637: Performed by: NURSE PRACTITIONER

## 2019-10-04 PROCEDURE — 36430 TRANSFUSION BLD/BLD COMPNT: CPT

## 2019-10-04 PROCEDURE — 74011250636 HC RX REV CODE- 250/636: Performed by: NURSE PRACTITIONER

## 2019-10-04 PROCEDURE — 74011000258 HC RX REV CODE- 258: Performed by: INTERNAL MEDICINE

## 2019-10-04 PROCEDURE — 80053 COMPREHEN METABOLIC PANEL: CPT

## 2019-10-04 PROCEDURE — P9037 PLATE PHERES LEUKOREDU IRRAD: HCPCS

## 2019-10-04 PROCEDURE — 36591 DRAW BLOOD OFF VENOUS DEVICE: CPT

## 2019-10-04 PROCEDURE — 86644 CMV ANTIBODY: CPT

## 2019-10-04 PROCEDURE — 65270000029 HC RM PRIVATE

## 2019-10-04 PROCEDURE — 74011000258 HC RX REV CODE- 258: Performed by: NURSE PRACTITIONER

## 2019-10-04 PROCEDURE — 85025 COMPLETE CBC W/AUTO DIFF WBC: CPT

## 2019-10-04 RX ORDER — ACETAMINOPHEN 325 MG/1
650 TABLET ORAL
Status: DISCONTINUED | OUTPATIENT
Start: 2019-10-04 | End: 2019-10-05

## 2019-10-04 RX ORDER — VANCOMYCIN HYDROCHLORIDE
1250 EVERY 12 HOURS
Status: DISCONTINUED | OUTPATIENT
Start: 2019-10-04 | End: 2019-10-06

## 2019-10-04 RX ORDER — SODIUM CHLORIDE 9 MG/ML
250 INJECTION, SOLUTION INTRAVENOUS AS NEEDED
Status: DISCONTINUED | OUTPATIENT
Start: 2019-10-04 | End: 2019-10-09 | Stop reason: SDUPTHER

## 2019-10-04 RX ADMIN — SODIUM CHLORIDE 250 ML: 900 INJECTION, SOLUTION INTRAVENOUS at 20:50

## 2019-10-04 RX ADMIN — MEROPENEM 500 MG: 500 INJECTION, POWDER, FOR SOLUTION INTRAVENOUS at 16:01

## 2019-10-04 RX ADMIN — DIPHENHYDRAMINE HYDROCHLORIDE 25 MG: 25 CAPSULE ORAL at 11:58

## 2019-10-04 RX ADMIN — LORAZEPAM 1 MG: 1 TABLET ORAL at 21:33

## 2019-10-04 RX ADMIN — MEROPENEM 500 MG: 500 INJECTION, POWDER, FOR SOLUTION INTRAVENOUS at 09:00

## 2019-10-04 RX ADMIN — ACETAMINOPHEN 650 MG: 325 TABLET, FILM COATED ORAL at 11:58

## 2019-10-04 RX ADMIN — MEROPENEM 500 MG: 500 INJECTION, POWDER, FOR SOLUTION INTRAVENOUS at 02:10

## 2019-10-04 RX ADMIN — ONDANSETRON 8 MG: 2 INJECTION INTRAMUSCULAR; INTRAVENOUS at 15:52

## 2019-10-04 RX ADMIN — ACYCLOVIR 400 MG: 800 TABLET ORAL at 17:01

## 2019-10-04 RX ADMIN — ACYCLOVIR 400 MG: 800 TABLET ORAL at 08:59

## 2019-10-04 RX ADMIN — NYSTATIN: 100000 CREAM TOPICAL at 17:01

## 2019-10-04 RX ADMIN — MEROPENEM 500 MG: 500 INJECTION, POWDER, FOR SOLUTION INTRAVENOUS at 20:56

## 2019-10-04 RX ADMIN — NYSTATIN: 100000 CREAM TOPICAL at 09:00

## 2019-10-04 RX ADMIN — POTASSIUM CHLORIDE 20 MEQ: 20 TABLET, EXTENDED RELEASE ORAL at 17:01

## 2019-10-04 RX ADMIN — VORICONAZOLE 200 MG: 200 TABLET, FILM COATED ORAL at 08:59

## 2019-10-04 RX ADMIN — VORICONAZOLE 200 MG: 200 TABLET, FILM COATED ORAL at 20:50

## 2019-10-04 RX ADMIN — PRAVASTATIN SODIUM 10 MG: 20 TABLET ORAL at 21:34

## 2019-10-04 RX ADMIN — ACETAMINOPHEN 650 MG: 325 TABLET, FILM COATED ORAL at 23:35

## 2019-10-04 RX ADMIN — POTASSIUM CHLORIDE 20 MEQ: 20 TABLET, EXTENDED RELEASE ORAL at 08:59

## 2019-10-04 RX ADMIN — DIPHENHYDRAMINE HYDROCHLORIDE 25 MG: 25 CAPSULE ORAL at 23:39

## 2019-10-04 RX ADMIN — DECITABINE 41 MG: 50 INJECTION, POWDER, LYOPHILIZED, FOR SOLUTION INTRAVENOUS at 16:54

## 2019-10-04 RX ADMIN — DULOXETINE 30 MG: 30 CAPSULE, DELAYED RELEASE ORAL at 08:59

## 2019-10-04 RX ADMIN — VANCOMYCIN HYDROCHLORIDE 1250 MG: 10 INJECTION, POWDER, LYOPHILIZED, FOR SOLUTION INTRAVENOUS at 14:19

## 2019-10-04 NOTE — PROGRESS NOTES
[facility] Hematology & Oncology Inpatient Hematology / Oncology Progress Note Admission Date: 2019  6:30 PM 
Reason for Admission/Hospital Course: Febrile neutropenia ([address] [D70.9, R50.81] 24 Hour Events: 
Afebrile, VSS Erythema to right groin, macular papular rash to low back, erythema to left shoulder - much improved Continue merrem/vancomycin (added to cover skin infection of left shoulder) /acyclovir/vfend D4 of Dacogen - gilteritinib remains on hold, plan to resume tomorrow  at bedside ROS: 
Constitutional: negative for fever, chills, weakness, malaise, fatigue. CV:  negative for chest pain, palpitations, edema. Respiratory:  negative for dyspnea, cough, wheezing. GI: negative for nausea, abdominal pain. 10 point review of systems is otherwise negative with the exception of the elements mentioned above in the HPI. No Known Allergies OBJECTIVE: 
Patient Vitals for the past 8 hrs: 
 BP Temp Pulse Resp SpO2  
10/04/19 0809 145/65 97.9 °F (36.6 °C) 77 18 93 % 10/04/19 0323 148/88 98 °F (36.7 °C) 71 18 94 % Temp (24hrs), Av °F (36.7 °C), Min:97.9 °F (36.6 °C), Max:98.1 °F (36.7 °C) 10/04 0701 - 10/04 1900 In: -  
Out: 1000 [Urine:1000] Physical Exam: 
Constitutional: Well developed, well nourished female in no acute distress, sitting comfortably in hospital bed. HEENT: Normocephalic and atraumatic. Oropharynx is clear, mucous membranes are moist.  Neck supple. Patchy alopecia. Skin +large bruise to right flank from previous fall, red indurated area to left upper posterior shoulder, smaller per MD but still erythematous. Warm and dry. + pallor. Linear erythematous patch to L upper chest, below port. Multiple bruises to BUE noted. Respiratory Lungs clear, normal air exchange without accessory muscle use. TORREZ greatly improved. CVS Normal rate, regular rhythm and normal S1 and S2. No murmurs, gallops, or rubs. Abdomen Soft, nontender and nondistended, normoactive bowel sounds. No palpable mass. No hepatosplenomegaly. Neuro Grossly nonfocal with no obvious sensory or motor deficits. MSK Normal range of motion in general. Trace BLE edema and no tenderness. Psych Appropriate mood and affect. Labs: 
   
Recent Labs 10/04/19 
0320 10/03/19 
8034 10/02/19 
2185 WBC 0.4* 0.4* 0.4* RBC 2.70* 2.38* 2.50* HGB 8.0* 7.1* 7.4* HCT 24.0* 21.5* 22.3*  
MCV 88.9 90.3 89.2 MCH 29.6 29.8 29.6 MCHC 33.3 33.0 33.2 RDW 13.3 13.7 13.9 PLT 6* 14* 19* GRANS  --   --  5* LYMPH  --   --  92* MONOS  --   --  3* EOS  --   --  0* BASOS  --   --  0 IG  --   --  0  
DF AUTOMATED MANUAL AUTOMATED ANEU  --   --  0.0* ABL  --   --  0.4* ABM  --   --  0.0* ALEKSANDR  --   --  0.0 ABB  --   --  0.0 AIG  --   --  0.0 Recent Labs 10/04/19 
0320 10/03/19 
4802 10/02/19 
1456  143 145  
K 4.4 4.4 4.2 * 109* 109* CO2 32 31 32 AGAP 3* 3* 4*  
GLU 93 90 91 BUN 19 18 15 CREA 0.71 0.65 0.67 GFRAA >60 >60 >60 GFRNA >60 >60 >60  
CA 8.6 8.7 8.4 SGOT 21 21 26 AP 91 85 89  
TP 6.2* 6.2* 6.2* ALB 2.5* 2.4* 2.3*  
GLOB 3.7* 3.8* 3.9* AGRAT 0.7* 0.6* 0.6* Imaging: XR CHEST SNGL V [[id number]] Collected: 09/29/19 1217 Order Status: Completed Updated: 09/29/19 1220 Narrative:    
Portable chest x-ray CLINICAL INDICATION: Crackles on physical exam 
 
FINDINGS: Single AP view the chest compared to a similar exam dated 9/24/2019 
shows the lungs to be slightly underexpanded. No airspace consolidation noted. No jens pulmonary edema. The cardiac silhouette and mediastinum are stable. A 
left-sided chest port is in place. Impression:    
IMPRESSION: Slightly underexpanded lungs, otherwise no acute abnormality. CT CHEST ABD PELV W CONT [[id number]] Collected: 09/27/19 1632 Order Status: Completed Updated: 09/27/19 1641 Narrative:    
 CT CHEST ABDOMEN AND PELVIS WITH CONTRAST HISTORY: fevers, SOB, new O2, 73 years Female  Neutropenic fever Possible infection Leukemia COMPARISON: Chest radiograph September 24, 2019 TECHNIQUE:  Oral contrast was administered.  100 cc of nonionic intravenous 
contrast was injected, and axial helical CT images were obtained from the 
thoracic inlet through the pelvis. Coronal reformatted images were obtained at 
the scanner console and made available for review.  Radiation dose reduction 
techniques were used for this study:  Our CT scanners use one or all of the 
following: Automated exposure control, adjustment of the mA and/or kVp according 
to patient's size, iterative reconstruction. FINDINGS: 
 
CHEST: 
Partially visualized thyroid unremarkable.  No evidence of significant 
mediastinal, hilar, or axillary lymphadenopathy.  Minimal calcific 
atherosclerosis of a normal caliber aortic arch and descending aorta.  Left 
chest wall nelli catheter appears to remain in anatomic position.  Trace 
pericardial effusion.  Trace bilateral pleural effusions with associated mild 
dependent subsegmental atelectasis bilateral lung bases.  Visualized proximal 
airways unremarkable. ABDOMEN: 
Normal-appearing liver, gallbladder, spleen, pancreas, bilateral adrenal glands. 
 Small simple appearing right renal interpolar region cortical cyst measuring 2.4 cm in diameter.  Small nonobstructing right renal medullary level calculus 
measuring 2 mm in diameter.  Subcentimeter left renal cortical hypoenhancing 
lesions which are too small to characterize but probably cysts.  Mild calcific 
atherosclerosis of a normal caliber abdominal aorta.  No evidence of significant 
lymphadenopathy.  Normal-appearing small bowel.  No evidence of intraperitoneal 
free air or free fluid.  Image quality somewhat degraded by respiratory motion 
artifact.  
 
PELVIS: 
 Normal-appearing urinary bladder.  Atrophic appearing uterus and bilateral 
adnexa with a coarse calcification of the uterine fundus which may represent a 
calcified fibroid.   Normal-appearing colon and partially visualized appendix. Trace pelvic free fluid, may be physiologic.  No evidence of significant 
inguinal or pelvic sidewall lymphadenopathy.  Visualized osseous structures 
unremarkable.    
Impression:    
IMPRESSION: 1.  Trace bilateral pleural effusions with a trace pericardial effusion. 2.  No other acute pathology identified.  Other incidental findings as above. XR CHEST PA LAT [[id number]] Collected: 09/24/19 1617 Order Status: Completed Updated: 09/24/19 1620 Narrative:    
CHEST X-RAY, 2 views 9/24/2019 History: Tachypnea and fever. Technique: PA and lateral views of the chest.  
 
Comparison: Chest x-ray 9/21/2019 Findings: A stable left-sided venous port is seen. The cardiac silhouette is mildly 
enlarged although stable.  The lungs are expanded without evidence for 
pneumothorax.  No evolving consolidation, or evidence of pleural effusion is 
seen. The bony thorax demonstrates no acute changes.  The upper abdomen is 
unremarkable in appearance. Impression:    
IMPRESSION:  
1.  Stable cardiomegaly without evolving acute changes evident by plain film 
imaging. XR CHEST PA LAT [[id number]] Collected: 09/21/19 2042 Order Status: Completed Updated: 09/21/19 2047 Narrative:    
EXAM: Chest x-ray. INDICATION: Febrile neutropenia. COMPARISON: May 18, 2019. TECHNIQUE: Frontal and lateral x-rays of the chest were obtained. FINDINGS: The lungs are clear except for minimal linear atelectasis or scarring 
in the lateral left costophrenic angle. The cardiac size, mediastinal contour 
and pulmonary vasculature are within normal limits. No pneumothorax or pleural 
effusion is seen. A left chest wall infusion port catheter remains in place.   
Impression:    
 IMPRESSION: No acute process. Medications: 
Current Facility-Administered Medications Medication Dose Route Frequency  0.9% sodium chloride infusion 250 mL  250 mL IntraVENous PRN  
 0.9% sodium chloride infusion 250 mL  250 mL IntraVENous PRN  
 nystatin (MYCOSTATIN) 100,000 unit/gram cream   Topical BID  calcium carbonate (TUMS) chewable tablet 200 mg [elemental]  200 mg Oral TID PRN  
 ondansetron (ZOFRAN) injection 8 mg  8 mg IntraVENous Q24H  
 decitabine (DACOGEN) 41 mg in 0.9% sodium chloride 100 mL chemo infusion  41 mg IntraVENous Q24H  
 diphenhydrAMINE (BENADRYL) capsule 25 mg  25 mg Oral Q6H PRN  
 loperamide (IMODIUM) capsule 2 mg  2 mg Oral Q4H PRN  
 diphenoxylate-atropine (LOMOTIL) tablet 2 Tab  2 Tab Oral QID PRN  
 meropenem (MERREM) 500 mg in 0.9% sodium chloride (MBP/ADV) 50 mL  0.5 g IntraVENous Q6H  
 voriconazole (VFEND) tablet 200 mg  200 mg Oral Q12H  potassium chloride (K-DUR, KLOR-CON) SR tablet 20 mEq  20 mEq Oral BID  polyethylene glycol (MIRALAX) packet 17 g  17 g Oral DAILY PRN  
 acetaminophen (TYLENOL) tablet 650 mg  650 mg Oral Q6H PRN  
 [Held by provider] gilteritinib tab (Xospata) 120 mg = 3 tabs  (Patient Supplied)  120 mg Oral PCL  sodium chloride (NS) flush 5-10 mL  5-10 mL IntraVENous PRN  
 DULoxetine (CYMBALTA) capsule 30 mg  30 mg Oral DAILY  LORazepam (ATIVAN) tablet 1 mg  1 mg Oral QHS  pravastatin (PRAVACHOL) tablet 10 mg  10 mg Oral QHS  zolpidem (AMBIEN) tablet 5 mg  5 mg Oral QHS PRN  
 acyclovir (ZOVIRAX) tablet 400 mg  400 mg Oral BID  influenza vaccine 2019-20 (6 mos+)(PF) (FLUARIX/FLULAVAL/FLUZONE QUAD) injection 0.5 mL  0.5 mL IntraMUSCular PRIOR TO DISCHARGE ASSESSMENT: 
 
Problem List  Date Reviewed: 9/19/2019 Codes Class Noted * (Principal) Febrile neutropenia (HCC) ICD-10-CM: D70.9, R50.81 ICD-9-CM: 288.00, 780.61  9/21/2019 Pancytopenia due to antineoplastic chemotherapy [facility]) ICD-10-CM: D61.810, T45.1X5A 
ICD-9-CM: 284.11, E933.1  6/12/2019 Cellulitis of neck ICD-10-CM: J77.143 ICD-9-CM: 682.1  6/12/2019 Immunocompromised status associated with infection ICD-10-CM: B99.9 ICD-9-CM: 136.9  6/12/2019 Port or reservoir infection ICD-10-CM: L92.115R ICD-9-CM: 999.33  6/12/2019 Acute myeloid leukemia not having achieved remission ([facility] ICD-10-CM: C92.00 ICD-9-CM: 205.00  5/9/2019 Admission for antineoplastic chemotherapy ICD-10-CM: Z51.11 ICD-9-CM: V58.11  5/5/2019 AML (acute myeloblastic leukemia) ([facility] ICD-10-CM: C92.00 ICD-9-CM: 205.00  4/28/2019 Weakness generalized ICD-10-CM: R53.1 ICD-9-CM: 780.79  4/28/2019 Pancytopenia ([facility] ICD-10-CM: U69.540 ICD-9-CM: 284.19  4/28/2019 Thrombocytopenia ([facility] ICD-10-CM: D69.6 ICD-9-CM: 287.5  4/27/2019 Ms. Nancy Serra is a 68year old female who was admitted on 9/21/2019 for neutropenic fever. She has been having intermittent low grade fevers over the last week. She came to infusion yesterday for a blood transfusion, and after going home she had a fever of 102.5 and was sent to the ER for evaluation. She is a known patient of Dr. Amy Shaw with AML. Initially treated with induction 7+3 and Midostaurin. Most recently on Decitabine plus Gilteritinib with cycle 2 beginning on 8/26/2019 and cycle 3 due 9/23/2019. CXR negative. UA clear. BC/UC NTD. Started on zosyn and vanc. PLAN: 
AML 
-On Dacogen/Gilteritinib with most recent cycle 2 on 8/26/2019 with cycle 3 due 9/23/2019 9/23 Discuss BMbx flow results with patient-persistent AML-58% blasts. Still waiting on final pathology. 9/24 Bone marrow biopsy final path still pending. 9/30 Plan to restart Dacogen while inpatient for 10 days. 10/1 Dacogen to restart today. Plan to start Gilteritinib on D5 if she remains afebrile. 10/4 Day 4 of Dacogen. Tolerating well. Resume Gilteritinib tomorrow. 
  
Neutropenic Fever 
-UA clear, BC/UC NTD 
-On Zosyn/Vanc empirically 9/23 Tmax 100.8. Feeling better today. Day 2 zosyn and vanc. BC/UC NTD 
9/24 Tmax 100.6. Day 3 vanc/zosyn. BC/UC still NGTD.  
9/25 Check aspergillus, fungitell, CMV. Add antifungal - vfend. CXR and BCx repeated yesterday and negative 9/26 BCx remain negative. Repeated this morning. Cdiff negative. D/c vancomycin. Continue zosyn, acyclovir, voriconazole. Consult ID for additional recommendations. Of note, gilteritinib can cause fevers in 13-35% of patients. Unclear timeline for febrile episodes? Will try to contact rep to discuss 9/27 Appreciate ID support. Changed to merrem. Given shortness of breath this morning, will proceed with CT CAP to eval for source. Could still be from drug vs disease as well 9/28 CT CAP negative for source of infection, PE. Did show trace pleural/pericardial effusion. Continue antibiotics. At this point, this may likely be drug or disease related fevers. Will hold gilteritinib and monitor fever curve 9/29 Gilteritinib remains on hold secondary to fevers 9/30 .8 overnight. Discussed with ID and okay to restart Dacogen, thorough workup and fevers improved overall. 10/1 Afebrile overnight and today thus far. Continuing on Merrem. 10/2 Remains afebrile. Merrem continues/ID following. 10/4 Remains afebrile. BCx-NGTD. On Merrem/Vanc. Erythema on L shoulder improved. Diarrhea 9/26 Cdiff negative. Add imodium/lomotil PRN Pancytopenia related to chemo/disease - Transfuse prn per [facility] SOPs 
10/4 Transfuse plt today Dyspnea 9/27 BNP elevated at 458. Check echo. DC fluids. Lasix x 1 
9/28 Repeat BNP tomorrow AM. Lasix after blood today 9/29 Dyspneic with exertion. Repeat lasix after platelets. Echo still pending. Repeat CXR. Noted crackles to left base 9/30 TORREZ improving slowly. Repeat Lasix 20 mg today. 10/1 TORREZ improving. Repeat Lasix today 20 mg x 1.   
10/2 TORREZ greatly improved. No further lasix today. Left shoulder ? Skin infection 10/2 Vanc started for redness/slight swelling/induration to left shoulder. Questionably related to fall but does not have the same appearance as bruise to right flank/side. Vanc started today. 10/4 L shoulder erythema improved. Con't Vanc. Goals and plan of care reviewed with the patient. All questions answered to the best of our ability. Disposition:  Will remain inpatient for 10 days of Dacogen. Janette Dash NP [facility] Hematology and Oncology [address] [place] [address] Office : [phone] Fax : [phone] Attending Addendum: 
I have personally performed a face to face diagnostic evaluation on this patient. I have reviewed and agree with the care plan as documented above by Janette Dash N.P.  My findings are as follows: Patient appears stable, heart rate regular without murmurs, abdomen is non-tender, bowel sounds are positive. 68 female, well-known to me for her history of AML, FLT3 ITD positive, failed induction with 7+3 with midostaurin, most recently on Dacogen plus Gilteritinib. Now admitted with neutropenic fevers, no clear infectious etiology. ID on board. Fevers felt to be likely leukemia related versus medications. Her Gilteritinib has now been on hold. Afebrile now. Recent BM biopsy with persistent residual AML: Discussed options, started Dacogen x10 days. Will reintroduce Gilteritinib on D5 dacogen. Continue regular labs with transfusions as needed. Recent CT CAP without any clear source of infection. Continue broad-spectrum antibiotics. Add iv vanco for LT shoulder area of erythema/infection: improving. Total time 35 min 50% in direct consultation about the patient's diagnosis and management Jimmy Moses MD 
[facility] H[facility] [address] [place] [address] [facility] Office : [phone] Fax : [phone]

## 2019-10-04 NOTE — PROGRESS NOTES
Infectious Disease Progress Note Today's Date: 10/4/2019 Admit Date: 2019 Impression: · Neutropenic fever; trended down over the past 72 hours. Unclear reason as to why. Meropenem? · No localizing symptoms, unclear etiology - drug reaction vs AML vs infection. CT negative. BC/Ucx negative to date. Mild diarrhea: C.diff negative. 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 Plan:  
· Continue Merrem;  I would like to continue this through the weekend and then I think that it could be reasonably deescalated to flouroquinolone prophylaxis · Continue ACV, voriconazole · Left shoulder lesion looks virtually unchanged. She was started on vancomycin for this. I don't think that this is something that vancomycin would treat. My recommendation is to stop it, but I will defer to the oncology team. 
 
Anti-infectives: · Vanc - · Zosyn - · merrem  - current · Voriconazole - current · Acyclovir prophylaxis · vancomycin Subjective: She feels great. No complaints. Sitting up and talking to friends. No Known Allergies Review of Systems:  A comprehensive review of systems was negative except for that written in the History of Present Illness. Objective:  
 
Visit Vitals /65 (BP 1 Location: Right arm, BP Patient Position: At rest) Pulse 77 Temp 97.9 °F (36.6 °C) Resp 18 Ht 5' 6\" (1.676 m) Wt 88.2 kg (194 lb 8 oz) SpO2 93% Breastfeeding? No  
BMI 31.39 kg/m² Temp (24hrs), Av °F (36.7 °C), Min:97.9 °F (36.6 °C), Max:98.1 °F (36.7 °C) Lines:  L chest port c/d/i Physical Exam:   
General:  Awake, alert; sitting up and talking to friends. Eyes:  Sclera anicteric. Pupils equally round and reactive to light. Mouth/Throat: Mucous membranes normal, oral pharynx clear Neck: Supple Lungs:   Clear to auscultation bilaterally, good effort CV:  Regular rate and rhythm, no murmur, click, rub or gallop Abdomen:   Soft, non-tender. bowel sounds normal; non-distended Extremities: No cyanosis or edema Skin: Skin color, texture, turgor normal. no acute rash, left shoulder/deltoid area indurated erythematous lesion, not painful; unchanged in size Musculoskeletal: No swelling or deformity Lines/Devices:  Intact, no erythema, drainage or tenderness Psych: Alert and oriented, normal mood affect given the setting Data Review: CBC: 
Recent Labs 10/04/19 
0320 10/03/19 
3707 10/02/19 
1154 WBC 0.4* 0.4* 0.4* GRANS  --   --  5* MONOS  --   --  3* EOS  --   --  0* ANEU  --   --  0.0* ABL  --   --  0.4* HGB 8.0* 7.1* 7.4* HCT 24.0* 21.5* 22.3*  
PLT 6* 14* 19* BMP: 
Recent Labs 10/04/19 
0320 10/03/19 
7034 10/02/19 
9623 CREA 0.71 0.65 0.67 BUN 19 18 15  143 145  
K 4.4 4.4 4.2 * 109* 109* CO2 32 31 32 AGAP 3* 3* 4*  
GLU 93 90 91 LFTS: 
Recent Labs 10/04/19 
0320 10/03/19 
1875 10/02/19 
4173 TBILI 0.5 0.4 0.4 ALT 26 25 31 SGOT 21 21 26 AP 91 85 89  
TP 6.2* 6.2* 6.2* ALB 2.5* 2.4* 2.3* Microbiology:  
 
All Micro Results Procedure Component Value Units Date/Time CULTURE, BLOOD [[id number]] Collected:  09/26/19 0753 Order Status:  Completed Specimen:  Blood Updated:  10/01/19 6422 Special Requests: SINGLE PORT Culture result: NO GROWTH 5 DAYS     
 CULTURE, BLOOD [[id number]] Collected:  09/26/19 1050 Order Status:  Completed Specimen:  Blood Updated:  10/01/19 7864 Special Requests: --     
  LEFT 
FOREARM Culture result: NO GROWTH 5 DAYS     
 CULTURE, BLOOD [[id number]] Collected:  09/24/19 1536 Order Status:  Completed Specimen:  Blood Updated:  09/29/19 1107 Special Requests: LATERAL PORT Culture result: NO GROWTH 5 DAYS CULTURE, BLOOD [[id number]] Collected:  09/24/19 1530 Order Status:  Completed Specimen:  Blood Updated:  09/29/19 1107 Special Requests: --     
  LEFT 
HAND Culture result: NO GROWTH 5 DAYS     
 CMV BY PCR, QT [[id number]] Collected:  09/25/19 2016 Order Status:  Completed Specimen:  Blood from Plasma Updated:  09/28/19 2035 CMV IU/mL NEGATIVE  IU/mL Comment: (NOTE) No CMV DNA detected. The quantitative range of this assay is 200 to 1 million IU/mL. This test was developed and its performance characteristics 
determined by [facility]. It has not been cleared or approved by the 
Food and Drug Administration. The FDA has determined that such 
clearance or approval is not necessary. Performed At: [address] [id number] Teresa Herring MD [id number] CMV log 10 IU/mL TEST NOT PERFORMED log10 IU/mL Comment: (NOTE) Unable to calculate result since non-numeric result obtained for 
component test. 
  
  
 C. DIFFICILE AG & TOXIN A/B [[id number]] Collected:  09/25/19 1703 Order Status:  Completed Specimen:  Stool Updated:  09/26/19 1203 [address] [place] ANTIGEN    
  C. DIFFICILE GDH ANTIGEN-NEGATIVE  
     
  C. difficile toxin C. DIFFICILE TOXIN-NEGATIVE  
     
  PCR Reflex NOT APPLICABLE INTERPRETATION    
  NEGATIVE FOR TOXIGENIC C. DIFFICILE Clinical Consideration NEGATIVE FOR TOXIGENIC C. DIFFICILE  
     
 CULTURE, BLOOD [[id number]] Collected:  09/21/19 1903 Order Status:  Completed Specimen:  Blood Updated:  09/26/19 9561 Special Requests: --     
  LEFT Antecubital 
  
  Culture result: NO GROWTH 5 DAYS     
 CULTURE, BLOOD [[id number]] Collected:  09/21/19 1850 Order Status:  Completed Specimen:  Blood Updated:  09/26/19 8976 Special Requests: --     
  LEFT 
PORT Culture result: NO GROWTH 5 DAYS     
 CULTURE, URINE [[id number]] Collected:  09/21/19 2039 Order Status:  Completed Specimen:  Urine from Clean catch Updated:  09/24/19 0700 Special Requests: NO SPECIAL REQUESTS Culture result:    
  <10,000 COLONIES/mL MIXED SKIN REGGIE ISOLATED Imaging:  
CT chest/abd/pelv (9/27/19) IMPRESSION:  
1. Trace bilateral pleural effusions with a trace pericardial effusion. 2.  No other acute pathology identified. Other incidental findings as above. Signed By: Arsalan Huggins MD   
 October 4, 2019

## 2019-10-04 NOTE — PROGRESS NOTES
Problem: Falls - Risk of 
Goal: *Absence of Falls Description Document An Dear Fall Risk and appropriate interventions in the flowsheet. Outcome: Progressing Towards Goal 
Note:  
Fall Risk Interventions: 
Mobility Interventions: Communicate number of staff needed for ambulation/transfer, Patient to call before getting OOB Medication Interventions: Teach patient to arise slowly, Patient to call before getting OOB Elimination Interventions: Call light in reach, Patient to call for help with toileting needs History of Falls Interventions: Investigate reason for fall Problem: Patient Education: Go to Patient Education Activity Goal: Patient/Family Education Outcome: Progressing Towards Goal

## 2019-10-04 NOTE — PROGRESS NOTES
's follow-up visit to convey care and concern. Ms. Aramis Graf appeared to be in good sprits, voicing that her needs were met and that prayer was desired. I offered spiritual interventions to her and spouse, including prayer as requested. Ariadne Pimentel MDiv Board Certified [employer]

## 2019-10-05 LAB
ALBUMIN SERPL-MCNC: 2.8 G/DL (ref 3.2–4.6)
ALBUMIN/GLOB SERPL: 0.8 {RATIO} (ref 1.2–3.5)
ALP SERPL-CCNC: 95 U/L (ref 50–136)
ALT SERPL-CCNC: 21 U/L (ref 12–65)
ANION GAP SERPL CALC-SCNC: 4 MMOL/L (ref 7–16)
AST SERPL-CCNC: 19 U/L (ref 15–37)
BILIRUB SERPL-MCNC: 0.4 MG/DL (ref 0.2–1.1)
BLD PROD TYP BPU: NORMAL
BPU ID: NORMAL
BUN SERPL-MCNC: 17 MG/DL (ref 8–23)
CALCIUM SERPL-MCNC: 8 MG/DL (ref 8.3–10.4)
CHLORIDE SERPL-SCNC: 110 MMOL/L (ref 98–107)
CO2 SERPL-SCNC: 31 MMOL/L (ref 21–32)
CREAT SERPL-MCNC: 0.6 MG/DL (ref 0.6–1)
DIFFERENTIAL METHOD BLD: ABNORMAL
ERYTHROCYTE [DISTWIDTH] IN BLOOD BY AUTOMATED COUNT: 13.2 % (ref 11.9–14.6)
GLOBULIN SER CALC-MCNC: 3.6 G/DL (ref 2.3–3.5)
GLUCOSE SERPL-MCNC: 89 MG/DL (ref 65–100)
HCT VFR BLD AUTO: 22.9 % (ref 35.8–46.3)
HGB BLD-MCNC: 7.6 G/DL (ref 11.7–15.4)
MCH RBC QN AUTO: 29.5 PG (ref 26.1–32.9)
MCHC RBC AUTO-ENTMCNC: 33.2 G/DL (ref 31.4–35)
MCV RBC AUTO: 88.8 FL (ref 79.6–97.8)
NRBC # BLD: 0 K/UL (ref 0–0.2)
PLATELET # BLD AUTO: 45 K/UL (ref 150–450)
PLATELET COMMENTS,PCOM: ABNORMAL
PMV BLD AUTO: 10.8 FL (ref 9.4–12.3)
POTASSIUM SERPL-SCNC: 4.2 MMOL/L (ref 3.5–5.1)
PROT SERPL-MCNC: 6.4 G/DL (ref 6.3–8.2)
RBC # BLD AUTO: 2.58 M/UL (ref 4.05–5.2)
RBC MORPH BLD: ABNORMAL
SODIUM SERPL-SCNC: 145 MMOL/L (ref 136–145)
STATUS OF UNIT,%ST: NORMAL
UNIT DIVISION, %UDIV: 0
WBC # BLD AUTO: 0.4 K/UL (ref 4.3–11.1)
WBC MORPH BLD: ABNORMAL

## 2019-10-05 PROCEDURE — 36591 DRAW BLOOD OFF VENOUS DEVICE: CPT

## 2019-10-05 PROCEDURE — 74011250637 HC RX REV CODE- 250/637: Performed by: INTERNAL MEDICINE

## 2019-10-05 PROCEDURE — 85025 COMPLETE CBC W/AUTO DIFF WBC: CPT

## 2019-10-05 PROCEDURE — 74011250636 HC RX REV CODE- 250/636: Performed by: INTERNAL MEDICINE

## 2019-10-05 PROCEDURE — 74011250637 HC RX REV CODE- 250/637: Performed by: NURSE PRACTITIONER

## 2019-10-05 PROCEDURE — 74011250636 HC RX REV CODE- 250/636: Performed by: NURSE PRACTITIONER

## 2019-10-05 PROCEDURE — 80053 COMPREHEN METABOLIC PANEL: CPT

## 2019-10-05 PROCEDURE — 74011000258 HC RX REV CODE- 258: Performed by: INTERNAL MEDICINE

## 2019-10-05 PROCEDURE — 77030003560 HC NDL HUBR BARD -A

## 2019-10-05 PROCEDURE — 99233 SBSQ HOSP IP/OBS HIGH 50: CPT | Performed by: INTERNAL MEDICINE

## 2019-10-05 PROCEDURE — 74011000258 HC RX REV CODE- 258: Performed by: NURSE PRACTITIONER

## 2019-10-05 PROCEDURE — 65270000029 HC RM PRIVATE

## 2019-10-05 RX ORDER — ACETAMINOPHEN 325 MG/1
650 TABLET ORAL
Status: DISCONTINUED | OUTPATIENT
Start: 2019-10-05 | End: 2019-10-10 | Stop reason: HOSPADM

## 2019-10-05 RX ORDER — MORPHINE SULFATE 2 MG/ML
2 INJECTION, SOLUTION INTRAMUSCULAR; INTRAVENOUS
Status: DISCONTINUED | OUTPATIENT
Start: 2019-10-05 | End: 2019-10-10 | Stop reason: HOSPADM

## 2019-10-05 RX ORDER — HYDROCODONE BITARTRATE AND ACETAMINOPHEN 5; 325 MG/1; MG/1
1 TABLET ORAL
Status: DISCONTINUED | OUTPATIENT
Start: 2019-10-05 | End: 2019-10-10 | Stop reason: HOSPADM

## 2019-10-05 RX ADMIN — POTASSIUM CHLORIDE 20 MEQ: 20 TABLET, EXTENDED RELEASE ORAL at 17:29

## 2019-10-05 RX ADMIN — DULOXETINE 30 MG: 30 CAPSULE, DELAYED RELEASE ORAL at 08:37

## 2019-10-05 RX ADMIN — MEROPENEM 500 MG: 500 INJECTION, POWDER, FOR SOLUTION INTRAVENOUS at 14:39

## 2019-10-05 RX ADMIN — VORICONAZOLE 200 MG: 200 TABLET, FILM COATED ORAL at 08:37

## 2019-10-05 RX ADMIN — DECITABINE 41 MG: 50 INJECTION, POWDER, LYOPHILIZED, FOR SOLUTION INTRAVENOUS at 15:40

## 2019-10-05 RX ADMIN — NYSTATIN: 100000 CREAM TOPICAL at 17:29

## 2019-10-05 RX ADMIN — VANCOMYCIN HYDROCHLORIDE 1250 MG: 10 INJECTION, POWDER, LYOPHILIZED, FOR SOLUTION INTRAVENOUS at 01:17

## 2019-10-05 RX ADMIN — Medication 10 ML: at 21:53

## 2019-10-05 RX ADMIN — LORAZEPAM 1 MG: 1 TABLET ORAL at 21:53

## 2019-10-05 RX ADMIN — ACYCLOVIR 400 MG: 800 TABLET ORAL at 17:29

## 2019-10-05 RX ADMIN — PRAVASTATIN SODIUM 10 MG: 20 TABLET ORAL at 21:53

## 2019-10-05 RX ADMIN — VANCOMYCIN HYDROCHLORIDE 1250 MG: 10 INJECTION, POWDER, LYOPHILIZED, FOR SOLUTION INTRAVENOUS at 13:13

## 2019-10-05 RX ADMIN — NYSTATIN: 100000 CREAM TOPICAL at 08:40

## 2019-10-05 RX ADMIN — POTASSIUM CHLORIDE 20 MEQ: 20 TABLET, EXTENDED RELEASE ORAL at 08:38

## 2019-10-05 RX ADMIN — DIPHENHYDRAMINE HYDROCHLORIDE 25 MG: 25 CAPSULE ORAL at 21:53

## 2019-10-05 RX ADMIN — MEROPENEM 500 MG: 500 INJECTION, POWDER, FOR SOLUTION INTRAVENOUS at 03:16

## 2019-10-05 RX ADMIN — VORICONAZOLE 200 MG: 200 TABLET, FILM COATED ORAL at 21:53

## 2019-10-05 RX ADMIN — ONDANSETRON 8 MG: 2 INJECTION INTRAMUSCULAR; INTRAVENOUS at 14:39

## 2019-10-05 RX ADMIN — ACYCLOVIR 400 MG: 800 TABLET ORAL at 08:38

## 2019-10-05 RX ADMIN — MEROPENEM 500 MG: 500 INJECTION, POWDER, FOR SOLUTION INTRAVENOUS at 08:38

## 2019-10-05 RX ADMIN — MEROPENEM 500 MG: 500 INJECTION, POWDER, FOR SOLUTION INTRAVENOUS at 21:53

## 2019-10-05 NOTE — PROGRESS NOTES
Problem: Falls - Risk of 
Goal: *Absence of Falls Description Document Rebbecca Persons Fall Risk and appropriate interventions in the flowsheet. Outcome: Progressing Towards Goal 
Note:  
Fall Risk Interventions: 
Mobility Interventions: Communicate number of staff needed for ambulation/transfer Medication Interventions: Teach patient to arise slowly Elimination Interventions: Patient to call for help with toileting needs, Call light in reach History of Falls Interventions: Consult care management for discharge planning, Door open when patient unattended, Evaluate medications/consider consulting pharmacy, Investigate reason for fall, Room close to nurse's station Problem: Patient Education: Go to Patient Education Activity Goal: Patient/Family Education Outcome: Progressing Towards Goal

## 2019-10-05 NOTE — PROGRESS NOTES
END OF SHIFT NOTE: 
 
Intake/Output 10/05 0701 - 10/05 1900 In: 1001.6 [P.O.:720; I.V.:281.6] Out: 700 [Urine:700] Voiding: YES Catheter: NO 
Drain:   
 
 
 
 
 
Stool:  1 occurrences. Stool Assessment Stool Color: Brown (09/28/19 0930) Stool Appearance: Soft; Formed (10/03/19 9478) Stool Amount: Small (09/28/19 0930) Stool Source/Status: Rectum (09/28/19 0930) Emesis:  0 occurrences. VITAL SIGNS Patient Vitals for the past 12 hrs: 
 Temp Pulse Resp BP SpO2  
10/05/19 1500 98.3 °F (36.8 °C) 69 18 127/77 97 % 10/05/19 1127 98.1 °F (36.7 °C) 75 18 143/67 97 % 10/05/19 0722 98.5 °F (36.9 °C) 73 18 136/77 96 % Pain Assessment Pain 1 Pain Scale 1: Numeric (0 - 10) (10/05/19 1439) Pain Intensity 1: 0 (10/05/19 1439) Patient Stated Pain Goal: 0 (10/05/19 1439) Pain Reassessment 1: Yes (10/05/19 1439) Pain Onset 1: now (10/04/19 2335) Pain Location 1: Foot;Leg (10/04/19 2335) Pain Orientation 1: Left;Right (10/04/19 2335) Pain Description 1: Aching (10/04/19 2335) Pain Intervention(s) 1: Medication (see MAR) (10/04/19 2335) Ambulating Yes Additional Information: Day 5 of Dacogen completed. Tolerated well. PO chemo added back; tolerated well. Van trough 10/6 at noon. Pt to D/C 10/10 upon completion of chemo. No complaints noted at this time. Shift report given to Jaden Metz RN oncoming nurse at the bedside.  
 
Deann Hunter RN

## 2019-10-05 NOTE — PROGRESS NOTES
[facility] Hematology & Oncology Inpatient Hematology / Oncology Progress Note Admission Date: 2019  6:30 PM 
Reason for Admission/Hospital Course: Febrile neutropenia ([facility] [address] [D70.9, R50.81] 24 Hour Events: 
Afebrile, VSS Erythema to right groin, macular papular rash to low back, erythema to left shoulder - much improved Continue merrem/vancomycin (added to cover skin infection of left shoulder) /acyclovir/vfend D5 of Dacogen - resuming gilteritinib today  at bedside ROS: 
Constitutional: negative for fever, chills, weakness, malaise, fatigue. CV:  negative for chest pain, palpitations, edema. Respiratory:  negative for dyspnea, cough, wheezing. GI: negative for nausea, abdominal pain. 10 point review of systems is otherwise negative with the exception of the elements mentioned above in the HPI. No Known Allergies OBJECTIVE: 
Patient Vitals for the past 8 hrs: 
 BP Temp Pulse Resp SpO2  
10/05/19 0722 136/77 98.5 °F (36.9 °C) 73 18 96 % 10/05/19 0302 138/76 97.5 °F (36.4 °C) 73 18 94 % Temp (24hrs), Av.9 °F (36.6 °C), Min:97.2 °F (36.2 °C), Max:98.5 °F (36.9 °C) 10/05 0701 - 10/05 1900 In: 240 [P.O.:240] Out: 700 [Urine:700] Physical Exam: 
Constitutional: Well developed, well nourished female in no acute distress, sitting comfortably in hospital bed. HEENT: Normocephalic and atraumatic. Oropharynx is clear, mucous membranes are moist.  Neck supple. Patchy alopecia. Skin +large bruise to right flank from previous fall, red indurated area to left upper posterior shoulder, smaller per MD but still erythematous. Warm and dry. + pallor. Linear erythematous patch to L upper chest, below port. Multiple bruises to BUE noted. Respiratory Lungs clear, normal air exchange without accessory muscle use. TORREZ greatly improved. CVS Normal rate, regular rhythm and normal S1 and S2. No murmurs, gallops, or rubs. Abdomen Soft, nontender and nondistended, normoactive bowel sounds. No palpable mass. No hepatosplenomegaly. Neuro Grossly nonfocal with no obvious sensory or motor deficits. MSK Normal range of motion in general. Trace BLE edema and no tenderness. Psych Appropriate mood and affect. Labs: 
   
Recent Labs 10/05/19 
0411 10/04/19 
0320 10/03/19 
9692 WBC 0.4* 0.4* 0.4* RBC 2.58* 2.70* 2.38* HGB 7.6* 8.0* 7.1*  
HCT 22.9* 24.0* 21.5* MCV 88.8 88.9 90.3 MCH 29.5 29.6 29.8 MCHC 33.2 33.3 33.0  
RDW 13.2 13.3 13.7 PLT 45* 6* 14* DF PENDING AUTOMATED MANUAL Recent Labs 10/05/19 
0411 10/04/19 
0320 10/03/19 
4198  143 143  
K 4.2 4.4 4.4 * 108* 109* CO2 31 32 31 AGAP 4* 3* 3*  
GLU 89 93 90 BUN 17 19 18 CREA 0.60 0.71 0.65 GFRAA >60 >60 >60 GFRNA >60 >60 >60  
CA 8.0* 8.6 8.7 SGOT 19 21 21 AP 95 91 85  
TP 6.4 6.2* 6.2* ALB 2.8* 2.5* 2.4*  
GLOB 3.6* 3.7* 3.8* AGRAT 0.8* 0.7* 0.6* Imaging: XR CHEST SNGL V [[id number]] Collected: 09/29/19 1217 Order Status: Completed Updated: 09/29/19 1220 Narrative:    
Portable chest x-ray CLINICAL INDICATION: Crackles on physical exam 
 
FINDINGS: Single AP view the chest compared to a similar exam dated 9/24/2019 
shows the lungs to be slightly underexpanded. No airspace consolidation noted. No jens pulmonary edema. The cardiac silhouette and mediastinum are stable. A 
left-sided chest port is in place. Impression:    
IMPRESSION: Slightly underexpanded lungs, otherwise no acute abnormality. CT CHEST ABD PELV W CONT [[id number]] Collected: 09/27/19 1632 Order Status: Completed Updated: 09/27/19 1641 Narrative:    
CT CHEST ABDOMEN AND PELVIS WITH CONTRAST HISTORY: fevers, SOB, new O2, 73 years Female  Neutropenic fever Possible infection Leukemia COMPARISON: Chest radiograph September 24, 2019 TECHNIQUE:  Oral contrast was administered.  100 cc of nonionic intravenous contrast was injected, and axial helical CT images were obtained from the 
thoracic inlet through the pelvis. Coronal reformatted images were obtained at 
the scanner console and made available for review.  Radiation dose reduction 
techniques were used for this study:  Our CT scanners use one or all of the 
following: Automated exposure control, adjustment of the mA and/or kVp according 
to patient's size, iterative reconstruction. FINDINGS: 
 
CHEST: 
Partially visualized thyroid unremarkable.  No evidence of significant 
mediastinal, hilar, or axillary lymphadenopathy.  Minimal calcific 
atherosclerosis of a normal caliber aortic arch and descending aorta.  Left 
chest wall nelli catheter appears to remain in anatomic position.  Trace 
pericardial effusion.  Trace bilateral pleural effusions with associated mild 
dependent subsegmental atelectasis bilateral lung bases.  Visualized proximal 
airways unremarkable. ABDOMEN: 
Normal-appearing liver, gallbladder, spleen, pancreas, bilateral adrenal glands. 
 Small simple appearing right renal interpolar region cortical cyst measuring 2.4 cm in diameter.  Small nonobstructing right renal medullary level calculus 
measuring 2 mm in diameter.  Subcentimeter left renal cortical hypoenhancing 
lesions which are too small to characterize but probably cysts.  Mild calcific 
atherosclerosis of a normal caliber abdominal aorta.  No evidence of significant 
lymphadenopathy.  Normal-appearing small bowel.  No evidence of intraperitoneal 
free air or free fluid.  Image quality somewhat degraded by respiratory motion 
artifact. PELVIS: 
Normal-appearing urinary bladder.  Atrophic appearing uterus and bilateral 
adnexa with a coarse calcification of the uterine fundus which may represent a 
calcified fibroid.   Normal-appearing colon and partially visualized appendix. Trace pelvic free fluid, may be physiologic.  No evidence of significant inguinal or pelvic sidewall lymphadenopathy.  Visualized osseous structures 
unremarkable.    
Impression:    
IMPRESSION: 1.  Trace bilateral pleural effusions with a trace pericardial effusion. 2.  No other acute pathology identified.  Other incidental findings as above. XR CHEST PA LAT [[id number]] Collected: 09/24/19 1617 Order Status: Completed Updated: 09/24/19 1620 Narrative:    
CHEST X-RAY, 2 views 9/24/2019 History: Tachypnea and fever. Technique: PA and lateral views of the chest.  
 
Comparison: Chest x-ray 9/21/2019 Findings: A stable left-sided venous port is seen. The cardiac silhouette is mildly 
enlarged although stable.  The lungs are expanded without evidence for 
pneumothorax.  No evolving consolidation, or evidence of pleural effusion is 
seen. The bony thorax demonstrates no acute changes.  The upper abdomen is 
unremarkable in appearance. Impression:    
IMPRESSION:  
1.  Stable cardiomegaly without evolving acute changes evident by plain film 
imaging. XR CHEST PA LAT [[id number]] Collected: 09/21/19 2042 Order Status: Completed Updated: 09/21/19 2047 Narrative:    
EXAM: Chest x-ray. INDICATION: Febrile neutropenia. COMPARISON: May 18, 2019. TECHNIQUE: Frontal and lateral x-rays of the chest were obtained. FINDINGS: The lungs are clear except for minimal linear atelectasis or scarring 
in the lateral left costophrenic angle. The cardiac size, mediastinal contour 
and pulmonary vasculature are within normal limits. No pneumothorax or pleural 
effusion is seen. A left chest wall infusion port catheter remains in place. Impression:    
IMPRESSION: No acute process. Medications: 
Current Facility-Administered Medications Medication Dose Route Frequency  HYDROcodone-acetaminophen (NORCO) 5-325 mg per tablet 1 Tab  1 Tab Oral Q6H PRN  
 acetaminophen (TYLENOL) tablet 650 mg  650 mg Oral Q4H PRN  
  morphine injection 2 mg  2 mg IntraVENous Q4H PRN  
 0.9% sodium chloride infusion 250 mL  250 mL IntraVENous PRN  
 gilteritinib tab 120 mg (Patient Supplied)  120 mg Oral PCL  vancomycin (VANCOCIN) 1250 mg in  ml infusion  1,250 mg IntraVENous Q12H  
 0.9% sodium chloride infusion 250 mL  250 mL IntraVENous PRN  
 nystatin (MYCOSTATIN) 100,000 unit/gram cream   Topical BID  calcium carbonate (TUMS) chewable tablet 200 mg [elemental]  200 mg Oral TID PRN  
 ondansetron (ZOFRAN) injection 8 mg  8 mg IntraVENous Q24H  
 decitabine (DACOGEN) 41 mg in 0.9% sodium chloride 100 mL chemo infusion  41 mg IntraVENous Q24H  
 diphenhydrAMINE (BENADRYL) capsule 25 mg  25 mg Oral Q6H PRN  
 loperamide (IMODIUM) capsule 2 mg  2 mg Oral Q4H PRN  
 diphenoxylate-atropine (LOMOTIL) tablet 2 Tab  2 Tab Oral QID PRN  
 meropenem (MERREM) 500 mg in 0.9% sodium chloride (MBP/ADV) 50 mL  0.5 g IntraVENous Q6H  
 voriconazole (VFEND) tablet 200 mg  200 mg Oral Q12H  potassium chloride (K-DUR, KLOR-CON) SR tablet 20 mEq  20 mEq Oral BID  polyethylene glycol (MIRALAX) packet 17 g  17 g Oral DAILY PRN  
 sodium chloride (NS) flush 5-10 mL  5-10 mL IntraVENous PRN  
 DULoxetine (CYMBALTA) capsule 30 mg  30 mg Oral DAILY  LORazepam (ATIVAN) tablet 1 mg  1 mg Oral QHS  pravastatin (PRAVACHOL) tablet 10 mg  10 mg Oral QHS  zolpidem (AMBIEN) tablet 5 mg  5 mg Oral QHS PRN  
 acyclovir (ZOVIRAX) tablet 400 mg  400 mg Oral BID  influenza vaccine 2019-20 (6 mos+)(PF) (FLUARIX/FLULAVAL/FLUZONE QUAD) injection 0.5 mL  0.5 mL IntraMUSCular PRIOR TO DISCHARGE ASSESSMENT: 
 
Problem List  Date Reviewed: 9/19/2019 Codes Class Noted * (Principal) Febrile neutropenia (HCC) ICD-10-CM: D70.9, R50.81 ICD-9-CM: 288.00, 780.61  9/21/2019 Pancytopenia due to antineoplastic chemotherapy [facility]) ICD-10-CM: D61.810, T45.1X5A 
ICD-9-CM: 284.11, E933.1  6/12/2019 Cellulitis of neck ICD-10-CM: J50.615 ICD-9-CM: 682.1  6/12/2019 Immunocompromised status associated with infection ICD-10-CM: B99.9 ICD-9-CM: 136.9  6/12/2019 Port or reservoir infection ICD-10-CM: W31.738P ICD-9-CM: 999.33  6/12/2019 Acute myeloid leukemia not having achieved remission ([facility] ICD-10-CM: C92.00 ICD-9-CM: 205.00  5/9/2019 Admission for antineoplastic chemotherapy ICD-10-CM: Z51.11 ICD-9-CM: V58.11  5/5/2019 AML (acute myeloblastic leukemia) ([facility] ICD-10-CM: C92.00 ICD-9-CM: 205.00  4/28/2019 Weakness generalized ICD-10-CM: R53.1 ICD-9-CM: 780.79  4/28/2019 Pancytopenia ([facility] ICD-10-CM: A25.666 ICD-9-CM: 284.19  4/28/2019 Thrombocytopenia ([facility] ICD-10-CM: D69.6 ICD-9-CM: 287.5  4/27/2019 Ms. Nancy Serra is a 68year old female who was admitted on 9/21/2019 for neutropenic fever. She has been having intermittent low grade fevers over the last week. She came to infusion yesterday for a blood transfusion, and after going home she had a fever of 102.5 and was sent to the ER for evaluation. She is a known patient of Dr. Amy Shaw with AML. Initially treated with induction 7+3 and Midostaurin. Most recently on Decitabine plus Gilteritinib with cycle 2 beginning on 8/26/2019 and cycle 3 due 9/23/2019. CXR negative. UA clear. BC/UC NTD. Started on zosyn and vanc. PLAN: 
AML 
-On Dacogen/Gilteritinib with most recent cycle 2 on 8/26/2019 with cycle 3 due 9/23/2019 9/23 Discuss BMbx flow results with patient-persistent AML-58% blasts. Still waiting on final pathology. 9/24 Bone marrow biopsy final path still pending. 9/30 Plan to restart Dacogen while inpatient for 10 days. 10/1 Dacogen to restart today. Plan to start Gilteritinib on D5 if she remains afebrile. 10/5 Day 5 of Dacogen. Tolerating well. Resume Gilteritinib today. 
  
Neutropenic Fever 
-UA clear, BC/UC NTD 
-On Zosyn/Vanc empirically 9/23 Tmax 100.8. Feeling better today. Day 2 zosyn and vanc. BC/UC NTD 
9/24 Tmax 100.6. Day 3 vanc/zosyn. BC/UC still NGTD.  
9/25 Check aspergillus, fungitell, CMV. Add antifungal - vfend. CXR and BCx repeated yesterday and negative 9/26 BCx remain negative. Repeated this morning. Cdiff negative. D/c vancomycin. Continue zosyn, acyclovir, voriconazole. Consult ID for additional recommendations. Of note, gilteritinib can cause fevers in 13-35% of patients. Unclear timeline for febrile episodes? Will try to contact rep to discuss 9/27 Appreciate ID support. Changed to merrem. Given shortness of breath this morning, will proceed with CT CAP to eval for source. Could still be from drug vs disease as well 9/28 CT CAP negative for source of infection, PE. Did show trace pleural/pericardial effusion. Continue antibiotics. At this point, this may likely be drug or disease related fevers. Will hold gilteritinib and monitor fever curve 9/29 Gilteritinib remains on hold secondary to fevers 9/30 .8 overnight. Discussed with ID and okay to restart Dacogen, thorough workup and fevers improved overall. 10/1 Afebrile overnight and today thus far. Continuing on Merrem. 10/2 Remains afebrile. Merrem continues/ID following. 10/5 Remains afebrile. BCx-NGTD. On Merrem/Vanc. Erythema on L shoulder much improved. Diarrhea 9/26 Cdiff negative. Add imodium/lomotil PRN Pancytopenia related to chemo/disease - Transfuse prn per [facility] SOPs 
10/4 Transfuse plt today Dyspnea 9/27 BNP elevated at 458. Check echo. DC fluids. Lasix x 1 
9/28 Repeat BNP tomorrow AM. Lasix after blood today 9/29 Dyspneic with exertion. Repeat lasix after platelets. Echo still pending. Repeat CXR. Noted crackles to left base 9/30 TORREZ improving slowly. Repeat Lasix 20 mg today. 10/1 TORREZ improving. Repeat Lasix today 20 mg x 1.   
10/2 TORREZ greatly improved. No further lasix today. Left shoulder ? Skin infection 10/2 Vanc started for redness/slight swelling/induration to left shoulder. Questionably related to fall but does not have the same appearance as bruise to right flank/side. Vanc started today. 10/4 L shoulder erythema improved. Con't Vanc. 10/5 ID recommends DC'ing Vanc, however area appears much improved on Vanc. Will continue Vanc for a couple more days d/t significant improvement of L shoulder cellulitis. Goals and plan of care reviewed with the patient. All questions answered to the best of our ability. Disposition:  Will remain inpatient for 10 days of Dacogen. Anthony Rosales NP [facility] Hematology and Oncology [address] [place] [address] Office : [phone] Fax : [phone] Attending Addendum: 
I have personally performed a face to face diagnostic evaluation on this patient. I have reviewed and agree with the care plan as documented above by Anthony Rosales N.P.  My findings are as follows: Patient appears stable, heart rate regular without murmurs, abdomen is non-tender, bowel sounds are positive. 68 female, well-known to me for her history of AML, FLT3 ITD positive, failed induction with 7+3 with midostaurin, most recently on Dacogen plus Gilteritinib. Now admitted with neutropenic fevers, no clear infectious etiology. ID on board. Fevers felt to be likely leukemia related versus medications. Her Gilteritinib has now been on hold. Afebrile now. Recent BM biopsy with persistent residual AML: Discussed options, started Dacogen x10 days. Will reintroduce Gilteritinib on D5 dacogen (tomorrow). Continue regular labs with transfusions as needed. Recent CT CAP without any clear source of infection. Continue broad-spectrum antibiotics. Added iv vanco for LT shoulder area of erythema/infection: improving. Ambulation encouraged. Total time 35 min 50% in direct consultation about the patient's diagnosis and management Catalina Werner MD 
[facility] Hematology/Oncology [id number] [address] Office : [phone] Fax : [phone]

## 2019-10-05 NOTE — PROGRESS NOTES
[id number] call placed to hematology/oncology pt request medication for leg cramps, on dacogen. Spoke to Mray Chandra NP received order to use tylenol for pain

## 2019-10-06 LAB
ALBUMIN SERPL-MCNC: 2.8 G/DL (ref 3.2–4.6)
ALBUMIN/GLOB SERPL: 0.8 {RATIO} (ref 1.2–3.5)
ALP SERPL-CCNC: 93 U/L (ref 50–136)
ALT SERPL-CCNC: 25 U/L (ref 12–65)
ANION GAP SERPL CALC-SCNC: 4 MMOL/L (ref 7–16)
AST SERPL-CCNC: 20 U/L (ref 15–37)
BILIRUB SERPL-MCNC: 0.3 MG/DL (ref 0.2–1.1)
BUN SERPL-MCNC: 18 MG/DL (ref 8–23)
CALCIUM SERPL-MCNC: 8.8 MG/DL (ref 8.3–10.4)
CHLORIDE SERPL-SCNC: 108 MMOL/L (ref 98–107)
CO2 SERPL-SCNC: 30 MMOL/L (ref 21–32)
CREAT SERPL-MCNC: 0.7 MG/DL (ref 0.6–1)
DIFFERENTIAL METHOD BLD: ABNORMAL
ERYTHROCYTE [DISTWIDTH] IN BLOOD BY AUTOMATED COUNT: 13.1 % (ref 11.9–14.6)
GLOBULIN SER CALC-MCNC: 3.6 G/DL (ref 2.3–3.5)
GLUCOSE SERPL-MCNC: 95 MG/DL (ref 65–100)
HCT VFR BLD AUTO: 22.8 % (ref 35.8–46.3)
HGB BLD-MCNC: 7.5 G/DL (ref 11.7–15.4)
MAGNESIUM SERPL-MCNC: 2.3 MG/DL (ref 1.8–2.4)
MCH RBC QN AUTO: 29.2 PG (ref 26.1–32.9)
MCHC RBC AUTO-ENTMCNC: 32.9 G/DL (ref 31.4–35)
MCV RBC AUTO: 88.7 FL (ref 79.6–97.8)
NRBC # BLD: 0 K/UL (ref 0–0.2)
PLATELET # BLD AUTO: 33 K/UL (ref 150–450)
PLATELET COMMENTS,PCOM: ABNORMAL
PMV BLD AUTO: 10.4 FL (ref 9.4–12.3)
POTASSIUM SERPL-SCNC: 4.5 MMOL/L (ref 3.5–5.1)
PROT SERPL-MCNC: 6.4 G/DL (ref 6.3–8.2)
RBC # BLD AUTO: 2.57 M/UL (ref 4.05–5.2)
RBC MORPH BLD: ABNORMAL
SODIUM SERPL-SCNC: 142 MMOL/L (ref 136–145)
VANCOMYCIN TROUGH SERPL-MCNC: 22.8 UG/ML (ref 5–20)
WBC # BLD AUTO: 0.5 K/UL (ref 4.3–11.1)
WBC MORPH BLD: ABNORMAL

## 2019-10-06 PROCEDURE — 74011250637 HC RX REV CODE- 250/637: Performed by: NURSE PRACTITIONER

## 2019-10-06 PROCEDURE — 80202 ASSAY OF VANCOMYCIN: CPT

## 2019-10-06 PROCEDURE — 74011250636 HC RX REV CODE- 250/636: Performed by: INTERNAL MEDICINE

## 2019-10-06 PROCEDURE — 36591 DRAW BLOOD OFF VENOUS DEVICE: CPT

## 2019-10-06 PROCEDURE — 74011250637 HC RX REV CODE- 250/637: Performed by: INTERNAL MEDICINE

## 2019-10-06 PROCEDURE — 80053 COMPREHEN METABOLIC PANEL: CPT

## 2019-10-06 PROCEDURE — 74011000258 HC RX REV CODE- 258: Performed by: INTERNAL MEDICINE

## 2019-10-06 PROCEDURE — 65270000029 HC RM PRIVATE

## 2019-10-06 PROCEDURE — 74011250636 HC RX REV CODE- 250/636: Performed by: NURSE PRACTITIONER

## 2019-10-06 PROCEDURE — 74011000258 HC RX REV CODE- 258: Performed by: NURSE PRACTITIONER

## 2019-10-06 PROCEDURE — 83735 ASSAY OF MAGNESIUM: CPT

## 2019-10-06 PROCEDURE — 85025 COMPLETE CBC W/AUTO DIFF WBC: CPT

## 2019-10-06 PROCEDURE — 99233 SBSQ HOSP IP/OBS HIGH 50: CPT | Performed by: INTERNAL MEDICINE

## 2019-10-06 RX ADMIN — VANCOMYCIN HYDROCHLORIDE 1000 MG: 1 INJECTION, POWDER, LYOPHILIZED, FOR SOLUTION INTRAVENOUS at 21:19

## 2019-10-06 RX ADMIN — MEROPENEM 500 MG: 500 INJECTION, POWDER, FOR SOLUTION INTRAVENOUS at 21:19

## 2019-10-06 RX ADMIN — PRAVASTATIN SODIUM 10 MG: 20 TABLET ORAL at 21:19

## 2019-10-06 RX ADMIN — NYSTATIN: 100000 CREAM TOPICAL at 17:35

## 2019-10-06 RX ADMIN — NYSTATIN: 100000 CREAM TOPICAL at 07:58

## 2019-10-06 RX ADMIN — ACYCLOVIR 400 MG: 800 TABLET ORAL at 17:34

## 2019-10-06 RX ADMIN — VORICONAZOLE 200 MG: 200 TABLET, FILM COATED ORAL at 07:52

## 2019-10-06 RX ADMIN — DULOXETINE 30 MG: 30 CAPSULE, DELAYED RELEASE ORAL at 07:59

## 2019-10-06 RX ADMIN — ONDANSETRON 8 MG: 2 INJECTION INTRAMUSCULAR; INTRAVENOUS at 15:25

## 2019-10-06 RX ADMIN — MEROPENEM 500 MG: 500 INJECTION, POWDER, FOR SOLUTION INTRAVENOUS at 07:53

## 2019-10-06 RX ADMIN — VANCOMYCIN HYDROCHLORIDE 1250 MG: 10 INJECTION, POWDER, LYOPHILIZED, FOR SOLUTION INTRAVENOUS at 01:33

## 2019-10-06 RX ADMIN — POTASSIUM CHLORIDE 20 MEQ: 20 TABLET, EXTENDED RELEASE ORAL at 17:34

## 2019-10-06 RX ADMIN — ACETAMINOPHEN 650 MG: 325 TABLET, FILM COATED ORAL at 21:27

## 2019-10-06 RX ADMIN — MEROPENEM 500 MG: 500 INJECTION, POWDER, FOR SOLUTION INTRAVENOUS at 14:50

## 2019-10-06 RX ADMIN — POTASSIUM CHLORIDE 20 MEQ: 20 TABLET, EXTENDED RELEASE ORAL at 07:51

## 2019-10-06 RX ADMIN — MEROPENEM 500 MG: 500 INJECTION, POWDER, FOR SOLUTION INTRAVENOUS at 04:04

## 2019-10-06 RX ADMIN — DECITABINE 41 MG: 50 INJECTION, POWDER, LYOPHILIZED, FOR SOLUTION INTRAVENOUS at 15:26

## 2019-10-06 RX ADMIN — DIPHENHYDRAMINE HYDROCHLORIDE 25 MG: 25 CAPSULE ORAL at 21:27

## 2019-10-06 RX ADMIN — ACYCLOVIR 400 MG: 800 TABLET ORAL at 07:52

## 2019-10-06 RX ADMIN — VORICONAZOLE 200 MG: 200 TABLET, FILM COATED ORAL at 21:19

## 2019-10-06 RX ADMIN — LORAZEPAM 1 MG: 1 TABLET ORAL at 21:19

## 2019-10-06 NOTE — PROGRESS NOTES
Problem: Falls - Risk of 
Goal: *Absence of Falls Description Document Rebbecca Persons Fall Risk and appropriate interventions in the flowsheet. Outcome: Progressing Towards Goal 
Note:  
Fall Risk Interventions: 
Mobility Interventions: Communicate number of staff needed for ambulation/transfer Medication Interventions: Patient to call before getting OOB, Teach patient to arise slowly Elimination Interventions: Call light in reach History of Falls Interventions: Room close to nurse's station

## 2019-10-06 NOTE — PROGRESS NOTES
Notified from lab of critical vanc trough of 22.8. Spoke with Henrik Banegas from pharmacy and will hold 1300 dose. Pharmacy will change to appropriate does.

## 2019-10-06 NOTE — PROGRESS NOTES
END OF SHIFT NOTE: 
 
Patient rested well overnight. No c/o pain or nausea. Voiding well, IVAbx infused without difficulty. Day 6 of Dacogen, Gilterinib restarted, afebrile. Skin around port remains red, appears improved with [id number]. Patient using hydrocortisone cream PRN around dressing. Intake/Output 10/05 1901 - 10/06 0700 In: 918.9 [P.O.:460; I.V.:458.9] Out: 1000 [Urine:1000] Voiding: YES Catheter: NO 
Drain:   
 
Stool:  1 occurrences. Stool Assessment Stool Color: Rollen [place] (10/05/19 2006) Stool Appearance: Soft; Formed (10/05/19 2006) Stool Amount: Small (10/05/19 2006) Stool Source/Status: Rectum (10/05/19 2006) Emesis:  0 occurrences. VITAL SIGNS Patient Vitals for the past 12 hrs: 
 Temp Pulse Resp BP SpO2  
10/06/19 0328 98.3 °F (36.8 °C) 75 18 151/71 94 % 10/05/19 2334 98.4 °F (36.9 °C) 78 18 156/83 94 % 10/05/19 1951 98.1 °F (36.7 °C) 78 18 138/86 97 % Pain Assessment Pain 1 Pain Scale 1: Numeric (0 - 10) (10/06/19 0988) Pain Intensity 1: 0 (10/06/19 0218) Patient Stated Pain Goal: 0 (10/06/19 5546) Pain Reassessment 1: Yes (10/05/19 1439) Pain Onset 1: now (10/04/19 2335) Pain Location 1: Foot;Leg (10/04/19 2335) Pain Orientation 1: Left;Right (10/04/19 2335) Pain Description 1: Aching (10/04/19 2335) Pain Intervention(s) 1: Medication (see MAR) (10/04/19 2335) Ambulating Yes Shift report given to oncoming nurse at the bedside. Bishnu Cat

## 2019-10-06 NOTE — PROGRESS NOTES
[facility] Hematology & Oncology Inpatient Hematology / Oncology Progress Note Admission Date: 2019  6:30 PM 
Reason for Admission/Hospital Course: Febrile neutropenia ([facility] [address] [D70.9, R50.81] 24 Hour Events: 
Afebrile, VSS Erythema to right groin, macular papular rash to low back, erythema to left shoulder - much improved Continue merrem/vancomycin (added to cover skin infection of left shoulder) /acyclovir/vfend D6 of Dacogen - resumed gilteritinib yesterday  at bedside ROS: 
Constitutional: negative for fever, chills, weakness, malaise, fatigue. CV:  negative for chest pain, palpitations, edema. Respiratory:  negative for dyspnea, cough, wheezing. GI: negative for nausea, abdominal pain. 10 point review of systems is otherwise negative with the exception of the elements mentioned above in the HPI. No Known Allergies OBJECTIVE: 
Patient Vitals for the past 8 hrs: 
 BP Temp Pulse Resp SpO2  
10/06/19 0733 142/66 98.4 °F (36.9 °C) 78 16 95 % 10/06/19 0328 151/71 98.3 °F (36.8 °C) 75 18 94 % Temp (24hrs), Av.3 °F (36.8 °C), Min:98.1 °F (36.7 °C), Max:98.4 °F (36.9 °C) 
 
10/06 0701 - 10/06 1900 In: -  
Out: 631 [[id number]] Physical Exam: 
Constitutional: Well developed, well nourished female in no acute distress, sitting comfortably in hospital bed. HEENT: Normocephalic and atraumatic. Oropharynx is clear, mucous membranes are moist.  Neck supple. Patchy alopecia. Skin +large bruise to right flank from previous fall, red indurated area to left upper posterior shoulder, smaller per MD but still erythematous. Warm and dry. + pallor. Linear erythematous patch to L upper chest, below port. Multiple bruises to BUE noted. Respiratory Lungs clear, normal air exchange without accessory muscle use. TORREZ greatly improved. CVS Normal rate, regular rhythm and normal S1 and S2. No murmurs, gallops, or rubs. Abdomen Soft, nontender and nondistended, normoactive bowel sounds. No palpable mass. No hepatosplenomegaly. Neuro Grossly nonfocal with no obvious sensory or motor deficits. MSK Normal range of motion in general. Trace BLE edema and no tenderness. Psych Appropriate mood and affect. Labs: 
   
Recent Labs 10/06/19 
0216 10/05/19 
0411 10/04/19 
0320 WBC 0.5* 0.4* 0.4* RBC 2.57* 2.58* 2.70* HGB 7.5* 7.6* 8.0*  
HCT 22.8* 22.9* 24.0*  
MCV 88.7 88.8 88.9 MCH 29.2 29.5 29.6 MCHC 32.9 33.2 33.3 RDW 13.1 13.2 13.3 PLT 33* 45* 6*  
DF MANUAL MANUAL AUTOMATED Recent Labs 10/06/19 
0216 10/05/19 
0411 10/04/19 
0320  145 143  
K 4.5 4.2 4.4  
* 110* 108* CO2 30 31 32 AGAP 4* 4* 3*  
GLU 95 89 93 BUN 18 17 19 CREA 0.70 0.60 0.71 GFRAA >60 >60 >60 GFRNA >60 >60 >60  
CA 8.8 8.0* 8.6 SGOT 20 19 21 AP 93 95 91  
TP 6.4 6.4 6.2* ALB 2.8* 2.8* 2.5*  
GLOB 3.6* 3.6* 3.7* AGRAT 0.8* 0.8* 0.7* MG 2.3  --   --   
 
 
 
Imaging: XR CHEST SNGL V [[id number]] Collected: 09/29/19 1217 Order Status: Completed Updated: 09/29/19 1220 Narrative:    
Portable chest x-ray CLINICAL INDICATION: Crackles on physical exam 
 
FINDINGS: Single AP view the chest compared to a similar exam dated 9/24/2019 
shows the lungs to be slightly underexpanded. No airspace consolidation noted. No jens pulmonary edema. The cardiac silhouette and mediastinum are stable. A 
left-sided chest port is in place. Impression:    
IMPRESSION: Slightly underexpanded lungs, otherwise no acute abnormality. CT CHEST ABD PELV W CONT [[id number]] Collected: 09/27/19 1632 Order Status: Completed Updated: 09/27/19 1641 Narrative:    
CT CHEST ABDOMEN AND PELVIS WITH CONTRAST HISTORY: fevers, SOB, new O2, 73 years Female  Neutropenic fever Possible infection Leukemia COMPARISON: Chest radiograph September 24, 2019 TECHNIQUE:  Oral contrast was administered.  100 cc of nonionic intravenous 
contrast was injected, and axial helical CT images were obtained from the 
thoracic inlet through the pelvis. Coronal reformatted images were obtained at 
the scanner console and made available for review.  Radiation dose reduction 
techniques were used for this study:  Our CT scanners use one or all of the 
following: Automated exposure control, adjustment of the mA and/or kVp according 
to patient's size, iterative reconstruction. FINDINGS: 
 
CHEST: 
Partially visualized thyroid unremarkable.  No evidence of significant 
mediastinal, hilar, or axillary lymphadenopathy.  Minimal calcific 
atherosclerosis of a normal caliber aortic arch and descending aorta.  Left 
chest wall nelli catheter appears to remain in anatomic position.  Trace 
pericardial effusion.  Trace bilateral pleural effusions with associated mild 
dependent subsegmental atelectasis bilateral lung bases.  Visualized proximal 
airways unremarkable. ABDOMEN: 
Normal-appearing liver, gallbladder, spleen, pancreas, bilateral adrenal glands. 
 Small simple appearing right renal interpolar region cortical cyst measuring 2.4 cm in diameter.  Small nonobstructing right renal medullary level calculus 
measuring 2 mm in diameter.  Subcentimeter left renal cortical hypoenhancing 
lesions which are too small to characterize but probably cysts.  Mild calcific 
atherosclerosis of a normal caliber abdominal aorta.  No evidence of significant 
lymphadenopathy.  Normal-appearing small bowel.  No evidence of intraperitoneal 
free air or free fluid.  Image quality somewhat degraded by respiratory motion 
artifact. PELVIS: 
Normal-appearing urinary bladder.  Atrophic appearing uterus and bilateral 
adnexa with a coarse calcification of the uterine fundus which may represent a 
calcified fibroid.   Normal-appearing colon and partially visualized appendix. Trace pelvic free fluid, may be physiologic.  No evidence of significant 
inguinal or pelvic sidewall lymphadenopathy.  Visualized osseous structures 
unremarkable.    
Impression:    
IMPRESSION: 1.  Trace bilateral pleural effusions with a trace pericardial effusion. 2.  No other acute pathology identified.  Other incidental findings as above. XR CHEST PA LAT [[id number]] Collected: 09/24/19 1617 Order Status: Completed Updated: 09/24/19 1620 Narrative:    
CHEST X-RAY, 2 views 9/24/2019 History: Tachypnea and fever. Technique: PA and lateral views of the chest.  
 
Comparison: Chest x-ray 9/21/2019 Findings: A stable left-sided venous port is seen. The cardiac silhouette is mildly 
enlarged although stable.  The lungs are expanded without evidence for 
pneumothorax.  No evolving consolidation, or evidence of pleural effusion is 
seen. The bony thorax demonstrates no acute changes.  The upper abdomen is 
unremarkable in appearance. Impression:    
IMPRESSION:  
1.  Stable cardiomegaly without evolving acute changes evident by plain film 
imaging. XR CHEST PA LAT [[id number]] Collected: 09/21/19 2042 Order Status: Completed Updated: 09/21/19 2047 Narrative:    
EXAM: Chest x-ray. INDICATION: Febrile neutropenia. COMPARISON: May 18, 2019. TECHNIQUE: Frontal and lateral x-rays of the chest were obtained. FINDINGS: The lungs are clear except for minimal linear atelectasis or scarring 
in the lateral left costophrenic angle. The cardiac size, mediastinal contour 
and pulmonary vasculature are within normal limits. No pneumothorax or pleural 
effusion is seen. A left chest wall infusion port catheter remains in place. Impression:    
IMPRESSION: No acute process. Medications: 
Current Facility-Administered Medications Medication Dose Route Frequency  HYDROcodone-acetaminophen (NORCO) 5-325 mg per tablet 1 Tab  1 Tab Oral Q6H PRN  
  acetaminophen (TYLENOL) tablet 650 mg  650 mg Oral Q4H PRN  
 morphine injection 2 mg  2 mg IntraVENous Q4H PRN  Vancomycin Trough Reminder   Other ONCE  
 0.9% sodium chloride infusion 250 mL  250 mL IntraVENous PRN  
 gilteritinib tab 120 mg (Patient Supplied)  120 mg Oral PCL  vancomycin (VANCOCIN) 1250 mg in  ml infusion  1,250 mg IntraVENous Q12H  
 0.9% sodium chloride infusion 250 mL  250 mL IntraVENous PRN  
 nystatin (MYCOSTATIN) 100,000 unit/gram cream   Topical BID  calcium carbonate (TUMS) chewable tablet 200 mg [elemental]  200 mg Oral TID PRN  
 ondansetron (ZOFRAN) injection 8 mg  8 mg IntraVENous Q24H  
 decitabine (DACOGEN) 41 mg in 0.9% sodium chloride 100 mL chemo infusion  41 mg IntraVENous Q24H  
 diphenhydrAMINE (BENADRYL) capsule 25 mg  25 mg Oral Q6H PRN  
 loperamide (IMODIUM) capsule 2 mg  2 mg Oral Q4H PRN  
 diphenoxylate-atropine (LOMOTIL) tablet 2 Tab  2 Tab Oral QID PRN  
 meropenem (MERREM) 500 mg in 0.9% sodium chloride (MBP/ADV) 50 mL  0.5 g IntraVENous Q6H  
 voriconazole (VFEND) tablet 200 mg  200 mg Oral Q12H  potassium chloride (K-DUR, KLOR-CON) SR tablet 20 mEq  20 mEq Oral BID  polyethylene glycol (MIRALAX) packet 17 g  17 g Oral DAILY PRN  
 sodium chloride (NS) flush 5-10 mL  5-10 mL IntraVENous PRN  
 DULoxetine (CYMBALTA) capsule 30 mg  30 mg Oral DAILY  LORazepam (ATIVAN) tablet 1 mg  1 mg Oral QHS  pravastatin (PRAVACHOL) tablet 10 mg  10 mg Oral QHS  zolpidem (AMBIEN) tablet 5 mg  5 mg Oral QHS PRN  
 acyclovir (ZOVIRAX) tablet 400 mg  400 mg Oral BID  influenza vaccine 2019-20 (6 mos+)(PF) (FLUARIX/FLULAVAL/FLUZONE QUAD) injection 0.5 mL  0.5 mL IntraMUSCular PRIOR TO DISCHARGE ASSESSMENT: 
 
Problem List  Date Reviewed: 9/19/2019 Codes Class Noted * (Principal) Febrile neutropenia (HCC) ICD-10-CM: D70.9, R50.81 ICD-9-CM: 288.00, 780.61  9/21/2019 Pancytopenia due to antineoplastic chemotherapy [facility]) ICD-10-CM: D61.810, T45.1X5A 
ICD-9-CM: 284.11, E933.1  6/12/2019 Cellulitis of neck ICD-10-CM: W19.036 ICD-9-CM: 682.1  6/12/2019 Immunocompromised status associated with infection ICD-10-CM: B99.9 ICD-9-CM: 136.9  6/12/2019 Port or reservoir infection ICD-10-CM: X24.430I ICD-9-CM: 999.33  6/12/2019 Acute myeloid leukemia not having achieved remission ([facility] ICD-10-CM: C92.00 ICD-9-CM: 205.00  5/9/2019 Admission for antineoplastic chemotherapy ICD-10-CM: Z51.11 ICD-9-CM: V58.11  5/5/2019 AML (acute myeloblastic leukemia) ([facility] ICD-10-CM: C92.00 ICD-9-CM: 205.00  4/28/2019 Weakness generalized ICD-10-CM: R53.1 ICD-9-CM: 780.79  4/28/2019 Pancytopenia ([facility] ICD-10-CM: K64.301 ICD-9-CM: 284.19  4/28/2019 Thrombocytopenia ([facility] ICD-10-CM: D69.6 ICD-9-CM: 287.5  4/27/2019 Ms. Mynor Acuña is a 68year old female who was admitted on 9/21/2019 for neutropenic fever. She has been having intermittent low grade fevers over the last week. She came to infusion yesterday for a blood transfusion, and after going home she had a fever of 102.5 and was sent to the ER for evaluation. She is a known patient of Dr. Patrice Tate with AML. Initially treated with induction 7+3 and Midostaurin. Most recently on Decitabine plus Gilteritinib with cycle 2 beginning on 8/26/2019 and cycle 3 due 9/23/2019. CXR negative. UA clear. BC/UC NTD. Started on zosyn and vanc. PLAN: 
AML 
-On Dacogen/Gilteritinib with most recent cycle 2 on 8/26/2019 with cycle 3 due 9/23/2019 9/23 Discuss BMbx flow results with patient-persistent AML-58% blasts. Still waiting on final pathology. 9/24 Bone marrow biopsy final path still pending. 9/30 Plan to restart Dacogen while inpatient for 10 days. 10/1 Dacogen to restart today. Plan to start Gilteritinib on D5 if she remains afebrile. 10/6 Day 6 of Dacogen. Tolerating well. Resumed Gilteritinib yesterday 
  
Neutropenic Fever 
-UA clear, BC/UC NTD 
-On Zosyn/Vanc empirically 9/23 Tmax 100.8. Feeling better today. Day 2 zosyn and vanc. BC/UC NTD 
9/24 Tmax 100.6. Day 3 vanc/zosyn. BC/UC still NGTD.  
9/25 Check aspergillus, fungitell, CMV. Add antifungal - vfend. CXR and BCx repeated yesterday and negative 9/26 BCx remain negative. Repeated this morning. Cdiff negative. D/c vancomycin. Continue zosyn, acyclovir, voriconazole. Consult ID for additional recommendations. Of note, gilteritinib can cause fevers in 13-35% of patients. Unclear timeline for febrile episodes? Will try to contact rep to discuss 9/27 Appreciate ID support. Changed to merrem. Given shortness of breath this morning, will proceed with CT CAP to eval for source. Could still be from drug vs disease as well 9/28 CT CAP negative for source of infection, PE. Did show trace pleural/pericardial effusion. Continue antibiotics. At this point, this may likely be drug or disease related fevers. Will hold gilteritinib and monitor fever curve 9/29 Gilteritinib remains on hold secondary to fevers 9/30 .8 overnight. Discussed with ID and okay to restart Dacogen, thorough workup and fevers improved overall. 10/1 Afebrile overnight and today thus far. Continuing on Merrem. 10/2 Remains afebrile. Merrem continues/ID following. 10/6 Remains afebrile. BCx-NGTD. On Merrem/Vanc. Erythema on L shoulder much improved. Diarrhea 9/26 Cdiff negative. Add imodium/lomotil PRN Pancytopenia related to chemo/disease - Transfuse prn per [facility] SOPs 
10/4 Transfuse plt today Dyspnea 9/27 BNP elevated at 458. Check echo. DC fluids. Lasix x 1 
9/28 Repeat BNP tomorrow AM. Lasix after blood today 9/29 Dyspneic with exertion. Repeat lasix after platelets. Echo still pending. Repeat CXR. Noted crackles to left base 9/30 TORREZ improving slowly. Repeat Lasix 20 mg today. 10/1 TORREZ improving. Repeat Lasix today 20 mg x 1.   
10/2 TORREZ greatly improved. No further lasix today. Left shoulder ? Skin infection 10/2 Vanc started for redness/slight swelling/induration to left shoulder. Questionably related to fall but does not have the same appearance as bruise to right flank/side. Vanc started today. 10/4 L shoulder erythema improved. Con't Vanc. 10/6 ID recommends DC'ing Vanc, however area appears much improved on Vanc. Will continue Vanc for a couple more days d/t significant improvement of L shoulder cellulitis. Goals and plan of care reviewed with the patient. All questions answered to the best of our ability. Disposition:  Will remain inpatient for 10 days of Dacogen. Anticipate discharge home on 10/10. She will need  twice weekly labs and weekly provider visits. Janette Dash NP [facility] [place] Hematology and Oncology [address] [place] [address] Office : [phone] Fax : [phone] Attending Addendum: 
I have personally performed a face to face diagnostic evaluation on this patient. I have reviewed and agree with the care plan as documented above by Janette Dash N.P.  My findings are as follows: Patient appears stable, heart rate regular without murmurs, abdomen is non-tender, bowel sounds are positive. 68 female, well-known to me for her history of AML, FLT3 ITD positive, failed induction with 7+3 with midostaurin, most recently on Dacogen plus Gilteritinib. Now admitted with neutropenic fevers, no clear infectious etiology. ID on board. Fevers felt to be likely leukemia related versus medications. Her Gilteritinib has was out on hold. Afebrile now. Recent BM biopsy with persistent residual AML: Discussed options, started Dacogen x10 days. Reintroduced Gilteritinib on D5 Dacogen, remain afebrile.   Continue regular labs with transfusions as needed. Recent CT CAP without any clear source of infection. Continue broad-spectrum antibiotics. Added iv vanco for LT shoulder area of erythema/infection: improving. Ambulation encouraged. Some volume overload TORREZ which improved w diuresis. Total time 35 min 50% in direct consultation about the patient's diagnosis and management Emigdio Lovell MD 
[facility] Hematology/Oncology [address] [place] [address] Office : [phone] Fax : [phone]

## 2019-10-06 NOTE — PROGRESS NOTES
END OF SHIFT NOTE: 
 
Intake/Output No intake/output data recorded. Voiding: YES Catheter: NO 
Drain:   
 
 
 
 
 
Stool:  0 occurrences. Stool Assessment Stool Color: Silvia Riverated (10/05/19 2006) Stool Appearance: Soft; Formed (10/06/19 3013) Stool Amount: Small (10/05/19 2006) Stool Source/Status: Rectum (10/05/19 2006) Emesis:  0 occurrences. VITAL SIGNS Patient Vitals for the past 12 hrs: 
 Temp Pulse Resp BP SpO2  
10/06/19 1503 98 °F (36.7 °C) 88 16 141/84 97 % 10/06/19 1114 98.4 °F (36.9 °C) 93 18 146/63 96 % 10/06/19 0733 98.4 °F (36.9 °C) 78 16 142/66 95 % Pain Assessment Pain 1 Pain Scale 1: Numeric (0 - 10) (10/06/19 1454) Pain Intensity 1: 0 (10/06/19 1454) Patient Stated Pain Goal: 0 (10/06/19 1454) Pain Reassessment 1: Yes (10/05/19 1439) Pain Onset 1: now (10/04/19 2335) Pain Location 1: Foot;Leg (10/04/19 2335) Pain Orientation 1: Left;Right (10/04/19 2335) Pain Description 1: Aching (10/04/19 2335) Pain Intervention(s) 1: Medication (see MAR) (10/04/19 2335) Ambulating Yes Additional Information: VSS. Afebrile. Completed day 6 of dacogen, tolerated well. No needs voiced at this time. Shift report given to oncoming nurse at the bedside.  
 
Sahra Vazquez, KAM

## 2019-10-06 NOTE — PROGRESS NOTES
Pharmacokinetic Consult to Pharmacist 
 
Brooklynn Monteiro is a 68 y.o. female being treated for febrile neutropenia with meropenem. Vancomycin has been added on 10/2/19 for SSTI. Height: 5' 6\" (167.6 cm)  Weight: 87 kg (191 lb 12.8 oz) Lab Results Component Value Date/Time BUN 18 10/06/2019 02:16 AM  
 Creatinine 0.70 10/06/2019 02:16 AM  
 WBC 0.5 (LL) 10/06/2019 02:16 AM  
 Procalcitonin 0.1 09/21/2019 06:50 PM  
 Lactic Acid (POC) 0.67 09/21/2019 08:51 PM  
  
Estimated Creatinine Clearance: 79.5 mL/min (based on SCr of 0.7 mg/dL). Lab Results Component Value Date/Time Vancomycin,trough 22.8 (HH) 10/06/2019 11:58 AM  
 
 
 
Day 5 of vancomycin. Goal trough is 10-20. Vancomycin trough resulted above goal at 22.8, so dose decreased to 1000 mg Q12H starting tonight at 2100 to allow time for drug to clear. Pharmacy will continue to follow. Please call with any questions. Thank you, 
Isa Self, PharmD Clinical Pharmacist 
[phone]

## 2019-10-07 LAB
ALBUMIN SERPL-MCNC: 2.7 G/DL (ref 3.2–4.6)
ALBUMIN/GLOB SERPL: 0.8 {RATIO} (ref 1.2–3.5)
ALP SERPL-CCNC: 96 U/L (ref 50–136)
ALT SERPL-CCNC: 21 U/L (ref 12–65)
ANION GAP SERPL CALC-SCNC: 6 MMOL/L (ref 7–16)
AST SERPL-CCNC: 23 U/L (ref 15–37)
BILIRUB SERPL-MCNC: 0.3 MG/DL (ref 0.2–1.1)
BUN SERPL-MCNC: 17 MG/DL (ref 8–23)
CALCIUM SERPL-MCNC: 8.2 MG/DL (ref 8.3–10.4)
CHLORIDE SERPL-SCNC: 112 MMOL/L (ref 98–107)
CO2 SERPL-SCNC: 29 MMOL/L (ref 21–32)
CREAT SERPL-MCNC: 0.63 MG/DL (ref 0.6–1)
DIFFERENTIAL METHOD BLD: ABNORMAL
ERYTHROCYTE [DISTWIDTH] IN BLOOD BY AUTOMATED COUNT: 12.9 % (ref 11.9–14.6)
GLOBULIN SER CALC-MCNC: 3.4 G/DL (ref 2.3–3.5)
GLUCOSE SERPL-MCNC: 85 MG/DL (ref 65–100)
HCT VFR BLD AUTO: 21.4 % (ref 35.8–46.3)
HGB BLD-MCNC: 7.1 G/DL (ref 11.7–15.4)
MCH RBC QN AUTO: 29.6 PG (ref 26.1–32.9)
MCHC RBC AUTO-ENTMCNC: 33.2 G/DL (ref 31.4–35)
MCV RBC AUTO: 89.2 FL (ref 79.6–97.8)
NRBC # BLD: 0 K/UL (ref 0–0.2)
PLATELET # BLD AUTO: 24 K/UL (ref 150–450)
PLATELET COMMENTS,PCOM: ABNORMAL
PMV BLD AUTO: 10.9 FL (ref 9.4–12.3)
POTASSIUM SERPL-SCNC: 4.3 MMOL/L (ref 3.5–5.1)
PROT SERPL-MCNC: 6.1 G/DL (ref 6.3–8.2)
RBC # BLD AUTO: 2.4 M/UL (ref 4.05–5.2)
RBC MORPH BLD: ABNORMAL
SODIUM SERPL-SCNC: 147 MMOL/L (ref 136–145)
WBC # BLD AUTO: 0.5 K/UL (ref 4.3–11.1)
WBC MORPH BLD: ABNORMAL

## 2019-10-07 PROCEDURE — 36591 DRAW BLOOD OFF VENOUS DEVICE: CPT

## 2019-10-07 PROCEDURE — 74011000258 HC RX REV CODE- 258: Performed by: NURSE PRACTITIONER

## 2019-10-07 PROCEDURE — 74011250637 HC RX REV CODE- 250/637: Performed by: NURSE PRACTITIONER

## 2019-10-07 PROCEDURE — 74011250636 HC RX REV CODE- 250/636: Performed by: INTERNAL MEDICINE

## 2019-10-07 PROCEDURE — 74011250637 HC RX REV CODE- 250/637: Performed by: INTERNAL MEDICINE

## 2019-10-07 PROCEDURE — 80053 COMPREHEN METABOLIC PANEL: CPT

## 2019-10-07 PROCEDURE — 74011250636 HC RX REV CODE- 250/636: Performed by: NURSE PRACTITIONER

## 2019-10-07 PROCEDURE — 99232 SBSQ HOSP IP/OBS MODERATE 35: CPT | Performed by: INTERNAL MEDICINE

## 2019-10-07 PROCEDURE — 65270000029 HC RM PRIVATE

## 2019-10-07 PROCEDURE — 74011000258 HC RX REV CODE- 258: Performed by: INTERNAL MEDICINE

## 2019-10-07 PROCEDURE — 85025 COMPLETE CBC W/AUTO DIFF WBC: CPT

## 2019-10-07 RX ORDER — LEVOFLOXACIN 500 MG/1
500 TABLET, FILM COATED ORAL EVERY 24 HOURS
Status: DISCONTINUED | OUTPATIENT
Start: 2019-10-07 | End: 2019-10-10 | Stop reason: HOSPADM

## 2019-10-07 RX ADMIN — VANCOMYCIN HYDROCHLORIDE 1000 MG: 1 INJECTION, POWDER, LYOPHILIZED, FOR SOLUTION INTRAVENOUS at 21:20

## 2019-10-07 RX ADMIN — NYSTATIN: 100000 CREAM TOPICAL at 08:34

## 2019-10-07 RX ADMIN — LORAZEPAM 1 MG: 1 TABLET ORAL at 22:31

## 2019-10-07 RX ADMIN — MEROPENEM 500 MG: 500 INJECTION, POWDER, FOR SOLUTION INTRAVENOUS at 03:17

## 2019-10-07 RX ADMIN — ACYCLOVIR 400 MG: 800 TABLET ORAL at 17:39

## 2019-10-07 RX ADMIN — MEROPENEM 500 MG: 500 INJECTION, POWDER, FOR SOLUTION INTRAVENOUS at 08:32

## 2019-10-07 RX ADMIN — VORICONAZOLE 200 MG: 200 TABLET, FILM COATED ORAL at 21:19

## 2019-10-07 RX ADMIN — PRAVASTATIN SODIUM 10 MG: 20 TABLET ORAL at 22:32

## 2019-10-07 RX ADMIN — ACYCLOVIR 400 MG: 800 TABLET ORAL at 08:32

## 2019-10-07 RX ADMIN — NYSTATIN: 100000 CREAM TOPICAL at 17:40

## 2019-10-07 RX ADMIN — VORICONAZOLE 200 MG: 200 TABLET, FILM COATED ORAL at 08:31

## 2019-10-07 RX ADMIN — LEVOFLOXACIN 500 MG: 500 TABLET, FILM COATED ORAL at 11:58

## 2019-10-07 RX ADMIN — POTASSIUM CHLORIDE 20 MEQ: 20 TABLET, EXTENDED RELEASE ORAL at 17:39

## 2019-10-07 RX ADMIN — DULOXETINE 30 MG: 30 CAPSULE, DELAYED RELEASE ORAL at 08:31

## 2019-10-07 RX ADMIN — VANCOMYCIN HYDROCHLORIDE 1000 MG: 1 INJECTION, POWDER, LYOPHILIZED, FOR SOLUTION INTRAVENOUS at 08:52

## 2019-10-07 RX ADMIN — POTASSIUM CHLORIDE 20 MEQ: 20 TABLET, EXTENDED RELEASE ORAL at 08:31

## 2019-10-07 RX ADMIN — DECITABINE 41 MG: 50 INJECTION, POWDER, LYOPHILIZED, FOR SOLUTION INTRAVENOUS at 15:47

## 2019-10-07 RX ADMIN — ONDANSETRON 8 MG: 2 INJECTION INTRAMUSCULAR; INTRAVENOUS at 15:41

## 2019-10-07 NOTE — PROGRESS NOTES
END OF SHIFT NOTE: 
 
Patient rested well this shift. PRN Tylenol and Benadryl given QHS for generalized aches and as a sleep aid. No N/V. Ambulated in hallways without difficulty. Remains afebrile, will receive D7 of Dacogen. Intake/Output 10/06 1901 - 10/07 0700 In: 1008 [P.O.:520; I.V.:488] Out: 1450 [UHNLO:1008] Voiding: YES Catheter: NO 
Drain:   
 
 
 
 
 
Stool:  0 occurrences. Stool Assessment Stool Color: Raya Ped (10/05/19 2006) Stool Appearance: Soft; Formed (10/06/19 2031) Stool Amount: Small (10/05/19 2006) Stool Source/Status: Rectum (10/05/19 2006) Emesis:  0 occurrences. VITAL SIGNS Patient Vitals for the past 12 hrs: 
 Temp Pulse Resp BP SpO2  
10/07/19 0425 98.3 °F (36.8 °C) 74 18 137/60 97 % 10/06/19 2258 98.5 °F (36.9 °C) 71 18 144/74 96 % 10/06/19 1938 98.5 °F (36.9 °C) 92 18 143/71 97 % Pain Assessment Pain 1 Pain Scale 1: Numeric (0 - 10) (10/07/19 0317) Pain Intensity 1: 0 (10/07/19 0317) Patient Stated Pain Goal: 0 (10/07/19 0317) Pain Reassessment 1: Yes (10/06/19 2224) Pain Onset 1: ongoing, periodic (10/06/19 2133) Pain Location 1: Generalized (10/06/19 2133) Pain Orientation 1: Left;Right (10/04/19 2335) Pain Description 1: Aching (10/06/19 2133) Pain Intervention(s) 1: Medication (see MAR) (10/06/19 2133) Ambulating Yes Shift report given to oncoming nurse at the bedside. Timothy Steve

## 2019-10-07 NOTE — PROGRESS NOTES
END OF SHIFT NOTE: 
 
Intake/Output No intake/output data recorded. Voiding: YES Catheter: NO 
Drain:   
 
 
 
 
 
Stool:  0 occurrences. Stool Assessment Stool Color: Martin Simmonds (10/05/19 2006) Stool Appearance: Soft; Formed (10/06/19 2031) Stool Amount: Small (10/05/19 2006) Stool Source/Status: Rectum (10/05/19 2006) Emesis:  0 occurrences. VITAL SIGNS Patient Vitals for the past 12 hrs: 
 Temp Pulse Resp BP SpO2  
10/07/19 1630 98 °F (36.7 °C) 76 19 135/55 98 % 10/07/19 1141 98.7 °F (37.1 °C) 96 18 146/79 97 % 10/07/19 0920 97.3 °F (36.3 °C) 93 18 132/53 97 % Pain Assessment Pain 1 Pain Scale 1: Numeric (0 - 10) (10/07/19 1445) Pain Intensity 1: 0 (10/07/19 1445) Patient Stated Pain Goal: 0 (10/07/19 1445) Pain Reassessment 1: Yes (10/06/19 2224) Pain Onset 1: ongoing, periodic (10/06/19 2133) Pain Location 1: Generalized (10/06/19 2133) Pain Orientation 1: Left;Right (10/04/19 2335) Pain Description 1: Aching (10/06/19 2133) Pain Intervention(s) 1: Medication (see MAR) (10/06/19 2133) Ambulating Yes Additional Information: VSS. Afebrile. Day 7 dacogen given and tolerated well. D/c possible on 10/10. No needs voiced at this time Shift report given to oncoming nurse at the bedside.  
 
Walter Ho RN

## 2019-10-07 NOTE — PROGRESS NOTES
Infectious Disease Progress Note Today's Date: 10/7/2019 Admit Date: 2019 Impression: · Neutropenic fever: resolved on  after start of meropenem; did not respond to earlier coverage. Decadron started 10/1, fever had improved prior. · No localizing symptoms, unclear etiology - drug reaction vs AML vs infection. CT negative. BC/Ucx negative to date. Mild diarrhea: C.diff negative. CMV, Fungitell, Galactomannan: negative · Multiple bruises but also other areas on skin less clearly bruising. 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 Remains neutropenic and thrombocytopenic. · Erythematous left shoulder macular lesion; mildly indurated; location would be IM injection site favorable but has not had one that she can remember. Fading Plan:  
· Stop meropenem today; has had 8 days; return to levaquin prophylaxis and follow response. · Continue ACV, voriconazole · Left shoulder lesion is fading; remains mildly indurated. Extensive bruising on abd/flank and left breast and around port site. · Continue Vancomycin; plan at least 5-7 days coverage pending response/counts. Anti-infectives: · Vanc -, 10/2- · Zosyn - · meropenem  -  
· Voriconazole - current · Acyclovir prophylaxis · Subjective: No complaints. Frustrated by counts. No diarrhea. No Known Allergies Review of Systems:  A comprehensive review of systems was negative except for that written in the History of Present Illness. Objective:  
 
Visit Vitals /53 (BP 1 Location: Right arm, BP Patient Position: Sitting) Pulse 93 Temp 97.3 °F (36.3 °C) Resp 18 Ht 5' 6\" (1.676 m) Wt 87.9 kg (193 lb 11.2 oz) SpO2 97% Breastfeeding? No  
BMI 31.26 kg/m² Temp (24hrs), Av.2 °F (36.8 °C), Min:97.3 °F (36.3 °C), Max:98.5 °F (36.9 °C) Lines:  L chest port c/d/i Physical Exam:   
General:  Awake, alert; sitting up ; getting ready to shower Eyes:  Sclera anicteric. Pupils equally round and reactive to light. Mouth/Throat: Mucous membranes normal, oral pharynx clear Neck: Supple and symmetric Lungs:   Clear to auscultation bilaterally, good effort CV:  Regular rate and rhythm, no murmur, click, rub or gallop Abdomen:   Soft, non-tender. bowel sounds normal; non-distended Extremities: No cyanosis or edema Skin: Large bruise on left flank, left breast and on arms; mildly indurated pink area on left shoulder; not tender or fluctuant Musculoskeletal: No swelling or deformity Lines/Devices:  Intact, no erythema, drainage or tenderness; local bruising/hyperpigmentation Psych: Alert and oriented, normal mood affect given the setting Data Review: CBC: 
Recent Labs 10/07/19 
9760 10/06/19 
0216 10/05/19 
0411 WBC 0.5* 0.5* 0.4* HGB 7.1* 7.5* 7.6* HCT 21.4* 22.8* 22.9*  
PLT 24* 33* 45* BMP: 
Recent Labs 10/07/19 
9668 10/06/19 
0216 10/05/19 
0411 CREA 0.63 0.70 0.60 BUN 17 18 17 * 142 145  
K 4.3 4.5 4.2 * 108* 110* CO2 29 30 31 AGAP 6* 4* 4*  
GLU 85 95 89 LFTS: 
Recent Labs 10/07/19 
6742 10/06/19 
0216 10/05/19 
0411 TBILI 0.3 0.3 0.4 ALT 21 25 21 SGOT 23 20 19 AP 96 93 95  
TP 6.1* 6.4 6.4 ALB 2.7* 2.8* 2.8* Microbiology:  
 
All Micro Results Procedure Component Value Units Date/Time CULTURE, BLOOD [[id number]] Collected:  09/26/19 0753 Order Status:  Completed Specimen:  Blood Updated:  10/01/19 0133 Special Requests: SINGLE PORT Culture result: NO GROWTH 5 DAYS     
 CULTURE, BLOOD [[id number]] Collected:  09/26/19 1050 Order Status:  Completed Specimen:  Blood Updated:  10/01/19 5690 Special Requests: --     
  LEFT 
FOREARM Culture result: NO GROWTH 5 DAYS     
 CULTURE, BLOOD [[id number]] Collected:  09/24/19 1536 Order Status:  Completed Specimen:  Blood Updated:  09/29/19 1107   Special Requests: LATERAL PORT     
 Culture result: NO GROWTH 5 DAYS     
 CULTURE, BLOOD [[id number]] Collected:  09/24/19 1530 Order Status:  Completed Specimen:  Blood Updated:  09/29/19 1107 Special Requests: --     
  LEFT 
HAND Culture result: NO GROWTH 5 DAYS     
 CMV BY PCR, QT [[id number]] Collected:  09/25/19 2016 Order Status:  Completed Specimen:  Blood from Plasma Updated:  09/28/19 2035 CMV IU/mL NEGATIVE  IU/mL Comment: (NOTE) No CMV DNA detected. The quantitative range of this assay is 200 to 1 million IU/mL. This test was developed and its performance characteristics 
determined by [facility]. It has not been cleared or approved by the 
Food and Drug Administration. The FDA has determined that such 
clearance or approval is not necessary. Performed At: [address] [id number] Yury Galye MD [id number] CMV log 10 IU/mL TEST NOT PERFORMED log10 IU/mL Comment: (NOTE) Unable to calculate result since non-numeric result obtained for 
component test. 
  
  
 C. DIFFICILE AG & TOXIN A/B [[id number]] Collected:  09/25/19 1703 Order Status:  Completed Specimen:  Stool Updated:  09/26/19 1203 [address] [place] ANTIGEN    
  C. DIFFICILE GDH ANTIGEN-NEGATIVE  
     
  C. difficile toxin C. DIFFICILE TOXIN-NEGATIVE  
     
  PCR Reflex NOT APPLICABLE INTERPRETATION    
  NEGATIVE FOR TOXIGENIC C. DIFFICILE Clinical Consideration NEGATIVE FOR TOXIGENIC C. DIFFICILE  
     
 CULTURE, BLOOD [[id number]] Collected:  09/21/19 1903 Order Status:  Completed Specimen:  Blood Updated:  09/26/19 5090 Special Requests: --     
  LEFT Antecubital 
  
  Culture result: NO GROWTH 5 DAYS     
 CULTURE, BLOOD [[id number]] Collected:  09/21/19 1850 Order Status:  Completed Specimen:  Blood Updated:  09/26/19 1112 Special Requests: --     
  LEFT 
PORT Culture result: NO GROWTH 5 DAYS CULTURE, URINE [[id number]] Collected:  09/21/19 2039 Order Status:  Completed Specimen:  Urine from Clean catch Updated:  09/24/19 0700 Special Requests: NO SPECIAL REQUESTS Culture result:    
  <10,000 COLONIES/mL MIXED SKIN REGGIE ISOLATED Imaging:  
CT chest/abd/pelv (9/27/19) IMPRESSION:  
1. Trace bilateral pleural effusions with a trace pericardial effusion. 2.  No other acute pathology identified. Other incidental findings as above. Signed By: Renuka Pope MD   
 October 7, 2019

## 2019-10-07 NOTE — PROGRESS NOTES
Chart reviewed pt is expected to discharge on 10/10 once treatment is complete. No needs anticipated from case management at this time. Case management will continue to follow.

## 2019-10-07 NOTE — PROGRESS NOTES
Problem: Falls - Risk of 
Goal: *Absence of Falls Description Document Ramon Host Fall Risk and appropriate interventions in the flowsheet. Outcome: Progressing Towards Goal 
Note:  
Fall Risk Interventions: 
Mobility Interventions: Patient to call before getting OOB Medication Interventions: Patient to call before getting OOB, Teach patient to arise slowly Elimination Interventions: Call light in reach History of Falls Interventions: Consult care management for discharge planning

## 2019-10-07 NOTE — PROGRESS NOTES
[facility] Hematology & Oncology Inpatient Hematology / Oncology Progress Note Admission Date: 2019  6:30 PM 
Reason for Admission/Hospital Course: Febrile neutropenia ([address] [D70.9, R50.81] 24 Hour Events: 
Afebrile, VSS D7 of Dacogen - resumed gilteritinib 10/6  at bedside ROS: 
Constitutional: negative for fever, chills, weakness, malaise, fatigue. CV:  negative for chest pain, palpitations, edema. Respiratory:  negative for dyspnea, cough, wheezing. GI: negative for nausea, abdominal pain. 10 point review of systems is otherwise negative with the exception of the elements mentioned above in the HPI. No Known Allergies OBJECTIVE: 
Patient Vitals for the past 8 hrs: 
 BP Temp Pulse Resp SpO2  
10/07/19 1141 146/79 98.7 °F (37.1 °C) 96 18 97 % 10/07/19 0920 132/53 97.3 °F (36.3 °C) 93 18 97 % Temp (24hrs), Av.3 °F (36.8 °C), Min:97.3 °F (36.3 °C), Max:98.7 °F (37.1 °C) 
 
10/07 07 - 10/07 1900 In: 720 [P.O.:720] Out: 1100 [Urine:1100] Physical Exam: 
Constitutional: Well developed, well nourished female in no acute distress, sitting comfortably in hospital bed. HEENT: Normocephalic and atraumatic. Oropharynx is clear, mucous membranes are moist.  Neck supple. Patchy alopecia. Skin +large bruise to right flank from previous fall, red indurated area to left upper posterior shoulder, smaller per MD but still erythematous. Warm and dry. + pallor. Linear erythematous patch to L upper chest, below port. Multiple bruises to BUE noted. Respiratory Lungs clear, normal air exchange without accessory muscle use. CVS Normal rate, regular rhythm and normal S1 and S2. No murmurs, gallops, or rubs. Abdomen Soft, nontender and nondistended, normoactive bowel sounds. No palpable mass. No hepatosplenomegaly. Neuro Grossly nonfocal with no obvious sensory or motor deficits. MSK Normal range of motion in general. Trace BLE edema and no tenderness. Psych Appropriate mood and affect. Labs: 
   
Recent Labs 10/07/19 
7386 10/06/19 
0216 10/05/19 
0411 WBC 0.5* 0.5* 0.4* RBC 2.40* 2.57* 2.58* HGB 7.1* 7.5* 7.6* HCT 21.4* 22.8* 22.9* MCV 89.2 88.7 88.8 MCH 29.6 29.2 29.5 MCHC 33.2 32.9 33.2 RDW 12.9 13.1 13.2 PLT 24* 33* 45* DF MANUAL MANUAL MANUAL Recent Labs 10/07/19 
7010 10/06/19 
0216 10/05/19 
0411 * 142 145  
K 4.3 4.5 4.2 * 108* 110* CO2 29 30 31 AGAP 6* 4* 4*  
GLU 85 95 89 BUN 17 18 17 CREA 0.63 0.70 0.60 GFRAA >60 >60 >60 GFRNA >60 >60 >60  
CA 8.2* 8.8 8.0*  
SGOT 23 20 19 AP 96 93 95  
TP 6.1* 6.4 6.4 ALB 2.7* 2.8* 2.8*  
GLOB 3.4 3.6* 3.6* AGRAT 0.8* 0.8* 0.8* MG  --  2.3  --   
 
 
 
Imaging: XR CHEST SNGL V [[id number]] Collected: 09/29/19 1217 Order Status: Completed Updated: 09/29/19 1220 Narrative:    
Portable chest x-ray CLINICAL INDICATION: Crackles on physical exam 
 
FINDINGS: Single AP view the chest compared to a similar exam dated 9/24/2019 
shows the lungs to be slightly underexpanded. No airspace consolidation noted. No jens pulmonary edema. The cardiac silhouette and mediastinum are stable. A 
left-sided chest port is in place. Impression:    
IMPRESSION: Slightly underexpanded lungs, otherwise no acute abnormality. CT CHEST ABD PELV W CONT [[id number]] Collected: 09/27/19 1632 Order Status: Completed Updated: 09/27/19 1641 Narrative:    
CT CHEST ABDOMEN AND PELVIS WITH CONTRAST HISTORY: fevers, SOB, new O2, 73 years Female  Neutropenic fever Possible infection Leukemia COMPARISON: Chest radiograph September 24, 2019 TECHNIQUE:  Oral contrast was administered.  100 cc of nonionic intravenous 
contrast was injected, and axial helical CT images were obtained from the 
thoracic inlet through the pelvis. Coronal reformatted images were obtained at the scanner console and made available for review.  Radiation dose reduction 
techniques were used for this study:  Our CT scanners use one or all of the 
following: Automated exposure control, adjustment of the mA and/or kVp according 
to patient's size, iterative reconstruction. FINDINGS: 
 
CHEST: 
Partially visualized thyroid unremarkable.  No evidence of significant 
mediastinal, hilar, or axillary lymphadenopathy.  Minimal calcific 
atherosclerosis of a normal caliber aortic arch and descending aorta.  Left 
chest wall nelli catheter appears to remain in anatomic position.  Trace 
pericardial effusion.  Trace bilateral pleural effusions with associated mild 
dependent subsegmental atelectasis bilateral lung bases.  Visualized proximal 
airways unremarkable. ABDOMEN: 
Normal-appearing liver, gallbladder, spleen, pancreas, bilateral adrenal glands. 
 Small simple appearing right renal interpolar region cortical cyst measuring 2.4 cm in diameter.  Small nonobstructing right renal medullary level calculus 
measuring 2 mm in diameter.  Subcentimeter left renal cortical hypoenhancing 
lesions which are too small to characterize but probably cysts.  Mild calcific 
atherosclerosis of a normal caliber abdominal aorta.  No evidence of significant 
lymphadenopathy.  Normal-appearing small bowel.  No evidence of intraperitoneal 
free air or free fluid.  Image quality somewhat degraded by respiratory motion 
artifact. PELVIS: 
Normal-appearing urinary bladder.  Atrophic appearing uterus and bilateral 
adnexa with a coarse calcification of the uterine fundus which may represent a 
calcified fibroid.   Normal-appearing colon and partially visualized appendix.   
Trace pelvic free fluid, may be physiologic.  No evidence of significant 
inguinal or pelvic sidewall lymphadenopathy.  Visualized osseous structures 
unremarkable.    
Impression:    
IMPRESSION:  
 
 1.  Trace bilateral pleural effusions with a trace pericardial effusion. 2.  No other acute pathology identified.  Other incidental findings as above. XR CHEST PA LAT [[id number]] Collected: 09/24/19 1617 Order Status: Completed Updated: 09/24/19 1620 Narrative:    
CHEST X-RAY, 2 views 9/24/2019 History: Tachypnea and fever. Technique: PA and lateral views of the chest.  
 
Comparison: Chest x-ray 9/21/2019 Findings: A stable left-sided venous port is seen. The cardiac silhouette is mildly 
enlarged although stable.  The lungs are expanded without evidence for 
pneumothorax.  No evolving consolidation, or evidence of pleural effusion is 
seen. The bony thorax demonstrates no acute changes.  The upper abdomen is 
unremarkable in appearance. Impression:    
IMPRESSION:  
1.  Stable cardiomegaly without evolving acute changes evident by plain film 
imaging. XR CHEST PA LAT [[id number]] Collected: 09/21/19 2042 Order Status: Completed Updated: 09/21/19 2047 Narrative:    
EXAM: Chest x-ray. INDICATION: Febrile neutropenia. COMPARISON: May 18, 2019. TECHNIQUE: Frontal and lateral x-rays of the chest were obtained. FINDINGS: The lungs are clear except for minimal linear atelectasis or scarring 
in the lateral left costophrenic angle. The cardiac size, mediastinal contour 
and pulmonary vasculature are within normal limits. No pneumothorax or pleural 
effusion is seen. A left chest wall infusion port catheter remains in place. Impression:    
IMPRESSION: No acute process. Medications: 
Current Facility-Administered Medications Medication Dose Route Frequency  levoFLOXacin (LEVAQUIN) tablet 500 mg  500 mg Oral Q24H  
 vancomycin (VANCOCIN) 1,000 mg in 0.9% sodium chloride (MBP/ADV) 250 mL  1,000 mg IntraVENous Q12H  
 HYDROcodone-acetaminophen (NORCO) 5-325 mg per tablet 1 Tab  1 Tab Oral Q6H PRN  
  acetaminophen (TYLENOL) tablet 650 mg  650 mg Oral Q4H PRN  
 morphine injection 2 mg  2 mg IntraVENous Q4H PRN  
 0.9% sodium chloride infusion 250 mL  250 mL IntraVENous PRN  
 gilteritinib tab 120 mg (Patient Supplied)  120 mg Oral PCL  
 0.9% sodium chloride infusion 250 mL  250 mL IntraVENous PRN  
 nystatin (MYCOSTATIN) 100,000 unit/gram cream   Topical BID  calcium carbonate (TUMS) chewable tablet 200 mg [elemental]  200 mg Oral TID PRN  
 ondansetron (ZOFRAN) injection 8 mg  8 mg IntraVENous Q24H  
 decitabine (DACOGEN) 41 mg in 0.9% sodium chloride 100 mL chemo infusion  41 mg IntraVENous Q24H  
 diphenhydrAMINE (BENADRYL) capsule 25 mg  25 mg Oral Q6H PRN  
 loperamide (IMODIUM) capsule 2 mg  2 mg Oral Q4H PRN  
 diphenoxylate-atropine (LOMOTIL) tablet 2 Tab  2 Tab Oral QID PRN  
 voriconazole (VFEND) tablet 200 mg  200 mg Oral Q12H  potassium chloride (K-DUR, KLOR-CON) SR tablet 20 mEq  20 mEq Oral BID  polyethylene glycol (MIRALAX) packet 17 g  17 g Oral DAILY PRN  
 sodium chloride (NS) flush 5-10 mL  5-10 mL IntraVENous PRN  
 DULoxetine (CYMBALTA) capsule 30 mg  30 mg Oral DAILY  LORazepam (ATIVAN) tablet 1 mg  1 mg Oral QHS  pravastatin (PRAVACHOL) tablet 10 mg  10 mg Oral QHS  zolpidem (AMBIEN) tablet 5 mg  5 mg Oral QHS PRN  
 acyclovir (ZOVIRAX) tablet 400 mg  400 mg Oral BID  influenza vaccine 2019-20 (6 mos+)(PF) (FLUARIX/FLULAVAL/FLUZONE QUAD) injection 0.5 mL  0.5 mL IntraMUSCular PRIOR TO DISCHARGE ASSESSMENT: 
 
Problem List  Date Reviewed: 9/19/2019 Codes Class Noted * (Principal) Febrile neutropenia (HCC) ICD-10-CM: D70.9, R50.81 ICD-9-CM: 288.00, 780.61  9/21/2019 Pancytopenia due to antineoplastic chemotherapy [facility]) ICD-10-CM: D61.810, T45.1X5A 
ICD-9-CM: 284.11, E933.1  6/12/2019 Cellulitis of neck ICD-10-CM: M81.486 ICD-9-CM: 682.1  6/12/2019 Immunocompromised status associated with infection ICD-10-CM: B99.9 ICD-9-CM: 136.9  6/12/2019 Port or reservoir infection ICD-10-CM: G13.482I ICD-9-CM: 999.33  6/12/2019 Acute myeloid leukemia not having achieved remission ([facility] ICD-10-CM: C92.00 ICD-9-CM: 205.00  5/9/2019 Admission for antineoplastic chemotherapy ICD-10-CM: Z51.11 ICD-9-CM: V58.11  5/5/2019 AML (acute myeloblastic leukemia) ([facility] ICD-10-CM: C92.00 ICD-9-CM: 205.00  4/28/2019 Weakness generalized ICD-10-CM: R53.1 ICD-9-CM: 780.79  4/28/2019 Pancytopenia ([facility] ICD-10-CM: S64.671 ICD-9-CM: 284.19  4/28/2019 Thrombocytopenia ([facility] ICD-10-CM: D69.6 ICD-9-CM: 287.5  4/27/2019 Ms. Ashanti Brown is a 68year old female who was admitted on 9/21/2019 for neutropenic fever. She has been having intermittent low grade fevers over the last week. She came to infusion yesterday for a blood transfusion, and after going home she had a fever of 102.5 and was sent to the ER for evaluation. She is a known patient of Dr. Andrea Pearl with AML. Initially treated with induction 7+3 and Midostaurin. Most recently on Decitabine plus Gilteritinib with cycle 2 beginning on 8/26/2019 and cycle 3 due 9/23/2019. CXR negative. UA clear. BC/UC NTD. Started on zosyn and vanc. PLAN: 
AML 
-On Dacogen/Gilteritinib with most recent cycle 2 on 8/26/2019 with cycle 3 due 9/23/2019 9/23 Discuss BMbx flow results with patient-persistent AML-58% blasts. Still waiting on final pathology. 9/24 Bone marrow biopsy final path still pending. 9/30 Plan to restart Dacogen while inpatient for 10 days. 10/1 Dacogen to restart today. Plan to start Gilteritinib on D5 if she remains afebrile. 10/6 Day 6 of Dacogen. Tolerating well. Resumed Gilteritinib yesterday 
  
Neutropenic Fever 
-UA clear, BC/UC NTD 
-On Zosyn/Vanc empirically 9/23 Tmax 100.8. Feeling better today. Day 2 zosyn and vanc.  BC/UC NTD 
 9/24 Tmax 100.6. Day 3 vanc/zosyn. BC/UC still NGTD.  
9/25 Check aspergillus, fungitell, CMV. Add antifungal - vfend. CXR and BCx repeated yesterday and negative 9/26 BCx remain negative. Repeated this morning. Cdiff negative. D/c vancomycin. Continue zosyn, acyclovir, voriconazole. Consult ID for additional recommendations. Of note, gilteritinib can cause fevers in 13-35% of patients. Unclear timeline for febrile episodes? Will try to contact rep to discuss 9/27 Appreciate ID support. Changed to merrem. Given shortness of breath this morning, will proceed with CT CAP to eval for source. Could still be from drug vs disease as well 9/28 CT CAP negative for source of infection, PE. Did show trace pleural/pericardial effusion. Continue antibiotics. At this point, this may likely be drug or disease related fevers. Will hold gilteritinib and monitor fever curve 9/29 Gilteritinib remains on hold secondary to fevers 9/30 .8 overnight. Discussed with ID and okay to restart Dacogen, thorough workup and fevers improved overall. 10/1 Afebrile overnight and today thus far. Continuing on Merrem. 10/2 Remains afebrile. Merrem continues/ID following. 10/6 Remains afebrile. BCx-NGTD. On Merrem/Vanc. Erythema on L shoulder much improved. 10/7 per ID stopping Merrem. Return to Levaquin prophylaxix and follow response. Continue ACV, voriconazole. Left shoulder lesion fading. Continue vanc at least 5-7 days coverage pending response/counts Diarrhea 9/26 Cdiff negative. Add imodium/lomotil PRN 
10/7 diarrhea resolved Pancytopenia related to chemo/disease - Transfuse prn per [facility] SOPs 
10/4 Transfuse plt today 10/7 Transfuse per [facility] SOPs Dyspnea 9/27 BNP elevated at 458. Check echo. DC fluids. Lasix x 1 
9/28 Repeat BNP tomorrow AM. Lasix after blood today 9/29 Dyspneic with exertion. Repeat lasix after platelets. Echo still pending. Repeat CXR. Noted crackles to left base 9/30 TORREZ improving slowly. Repeat Lasix 20 mg today. 10/1 TORREZ improving. Repeat Lasix today 20 mg x 1.   
10/2 TORREZ greatly improved. No further lasix today. 10/7 dyspnea resolved Left shoulder ? Skin infection 10/2 Vanc started for redness/slight swelling/induration to left shoulder. Questionably related to fall but does not have the same appearance as bruise to right flank/side. Vanc started today. 10/4 L shoulder erythema improved. Con't Vanc. 10/6 ID recommends DC'ing Vanc, however area appears much improved on Vanc. Will continue Vanc for a couple more days d/t significant improvement of L shoulder cellulitis. 10/7 per ID Left shoulder lesion fading. Continue vanc at least 5-7 days coverage pending response/counts Goals and plan of care reviewed with the patient. All questions answered to the best of our ability. Madilyn Buerger, NP [place] [facility] Hematology and Oncology [address] [address] Office : [phone] Fax : [phone]

## 2019-10-08 LAB
ALBUMIN SERPL-MCNC: 2.7 G/DL (ref 3.2–4.6)
ALBUMIN/GLOB SERPL: 0.8 {RATIO} (ref 1.2–3.5)
ALP SERPL-CCNC: 96 U/L (ref 50–136)
ALT SERPL-CCNC: 23 U/L (ref 12–65)
ANION GAP SERPL CALC-SCNC: 6 MMOL/L (ref 7–16)
AST SERPL-CCNC: 21 U/L (ref 15–37)
BASOPHILS # BLD: 0 K/UL (ref 0–0.2)
BASOPHILS NFR BLD: 0 % (ref 0–2)
BILIRUB SERPL-MCNC: 0.2 MG/DL (ref 0.2–1.1)
BUN SERPL-MCNC: 18 MG/DL (ref 8–23)
CALCIUM SERPL-MCNC: 8.2 MG/DL (ref 8.3–10.4)
CHLORIDE SERPL-SCNC: 110 MMOL/L (ref 98–107)
CO2 SERPL-SCNC: 28 MMOL/L (ref 21–32)
CREAT SERPL-MCNC: 0.7 MG/DL (ref 0.6–1)
DIFFERENTIAL METHOD BLD: ABNORMAL
EOSINOPHIL # BLD: 0 K/UL (ref 0–0.8)
EOSINOPHIL NFR BLD: 0 % (ref 0.5–7.8)
ERYTHROCYTE [DISTWIDTH] IN BLOOD BY AUTOMATED COUNT: 12.8 % (ref 11.9–14.6)
GLOBULIN SER CALC-MCNC: 3.6 G/DL (ref 2.3–3.5)
GLUCOSE SERPL-MCNC: 84 MG/DL (ref 65–100)
HCT VFR BLD AUTO: 20.3 % (ref 35.8–46.3)
HGB BLD-MCNC: 6.8 G/DL (ref 11.7–15.4)
IMM GRANULOCYTES # BLD AUTO: 0 K/UL (ref 0–0.5)
IMM GRANULOCYTES NFR BLD AUTO: 0 % (ref 0–5)
LYMPHOCYTES # BLD: 0.5 K/UL (ref 0.5–4.6)
LYMPHOCYTES NFR BLD: 96 % (ref 13–44)
MCH RBC QN AUTO: 29.8 PG (ref 26.1–32.9)
MCHC RBC AUTO-ENTMCNC: 33.5 G/DL (ref 31.4–35)
MCV RBC AUTO: 89 FL (ref 79.6–97.8)
MONOCYTES # BLD: 0 K/UL (ref 0.1–1.3)
MONOCYTES NFR BLD: 2 % (ref 4–12)
NEUTS SEG # BLD: 0 K/UL (ref 1.7–8.2)
NEUTS SEG NFR BLD: 2 % (ref 43–78)
NRBC # BLD: 0 K/UL (ref 0–0.2)
PLATELET # BLD AUTO: 19 K/UL (ref 150–450)
PLATELET COMMENTS,PCOM: ABNORMAL
PMV BLD AUTO: 9.8 FL (ref 9.4–12.3)
POTASSIUM SERPL-SCNC: 4.4 MMOL/L (ref 3.5–5.1)
PROT SERPL-MCNC: 6.3 G/DL (ref 6.3–8.2)
RBC # BLD AUTO: 2.28 M/UL (ref 4.05–5.2)
RBC MORPH BLD: ABNORMAL
SODIUM SERPL-SCNC: 144 MMOL/L (ref 136–145)
WBC # BLD AUTO: 0.5 K/UL (ref 4.3–11.1)
WBC MORPH BLD: ABNORMAL

## 2019-10-08 PROCEDURE — 74011250636 HC RX REV CODE- 250/636: Performed by: INTERNAL MEDICINE

## 2019-10-08 PROCEDURE — 86923 COMPATIBILITY TEST ELECTRIC: CPT

## 2019-10-08 PROCEDURE — 86900 BLOOD TYPING SEROLOGIC ABO: CPT

## 2019-10-08 PROCEDURE — 74011250636 HC RX REV CODE- 250/636: Performed by: NURSE PRACTITIONER

## 2019-10-08 PROCEDURE — 74011250637 HC RX REV CODE- 250/637: Performed by: INTERNAL MEDICINE

## 2019-10-08 PROCEDURE — 74011250637 HC RX REV CODE- 250/637: Performed by: NURSE PRACTITIONER

## 2019-10-08 PROCEDURE — 80053 COMPREHEN METABOLIC PANEL: CPT

## 2019-10-08 PROCEDURE — 65270000029 HC RM PRIVATE

## 2019-10-08 PROCEDURE — 85025 COMPLETE CBC W/AUTO DIFF WBC: CPT

## 2019-10-08 PROCEDURE — P9040 RBC LEUKOREDUCED IRRADIATED: HCPCS

## 2019-10-08 PROCEDURE — 36430 TRANSFUSION BLD/BLD COMPNT: CPT

## 2019-10-08 PROCEDURE — 99232 SBSQ HOSP IP/OBS MODERATE 35: CPT | Performed by: INTERNAL MEDICINE

## 2019-10-08 PROCEDURE — 36591 DRAW BLOOD OFF VENOUS DEVICE: CPT

## 2019-10-08 PROCEDURE — 74011000258 HC RX REV CODE- 258: Performed by: NURSE PRACTITIONER

## 2019-10-08 PROCEDURE — 86644 CMV ANTIBODY: CPT

## 2019-10-08 RX ORDER — SODIUM CHLORIDE 0.9 % (FLUSH) 0.9 %
10 SYRINGE (ML) INJECTION AS NEEDED
Status: DISCONTINUED | OUTPATIENT
Start: 2019-10-08 | End: 2019-10-10 | Stop reason: HOSPADM

## 2019-10-08 RX ORDER — SODIUM CHLORIDE 9 MG/ML
250 INJECTION, SOLUTION INTRAVENOUS AS NEEDED
Status: DISCONTINUED | OUTPATIENT
Start: 2019-10-08 | End: 2019-10-10 | Stop reason: HOSPADM

## 2019-10-08 RX ADMIN — DECITABINE 41 MG: 50 INJECTION, POWDER, LYOPHILIZED, FOR SOLUTION INTRAVENOUS at 15:27

## 2019-10-08 RX ADMIN — LORAZEPAM 1 MG: 1 TABLET ORAL at 21:10

## 2019-10-08 RX ADMIN — VANCOMYCIN HYDROCHLORIDE 1000 MG: 1 INJECTION, POWDER, LYOPHILIZED, FOR SOLUTION INTRAVENOUS at 20:06

## 2019-10-08 RX ADMIN — POTASSIUM CHLORIDE 20 MEQ: 20 TABLET, EXTENDED RELEASE ORAL at 17:17

## 2019-10-08 RX ADMIN — LEVOFLOXACIN 500 MG: 500 TABLET, FILM COATED ORAL at 11:28

## 2019-10-08 RX ADMIN — ACYCLOVIR 400 MG: 800 TABLET ORAL at 08:20

## 2019-10-08 RX ADMIN — ACETAMINOPHEN 650 MG: 325 TABLET, FILM COATED ORAL at 21:12

## 2019-10-08 RX ADMIN — VORICONAZOLE 200 MG: 200 TABLET, FILM COATED ORAL at 08:20

## 2019-10-08 RX ADMIN — DIPHENHYDRAMINE HYDROCHLORIDE 25 MG: 25 CAPSULE ORAL at 11:28

## 2019-10-08 RX ADMIN — ACETAMINOPHEN 650 MG: 325 TABLET, FILM COATED ORAL at 11:28

## 2019-10-08 RX ADMIN — VANCOMYCIN HYDROCHLORIDE 1000 MG: 1 INJECTION, POWDER, LYOPHILIZED, FOR SOLUTION INTRAVENOUS at 08:21

## 2019-10-08 RX ADMIN — NYSTATIN: 100000 CREAM TOPICAL at 17:18

## 2019-10-08 RX ADMIN — ACYCLOVIR 400 MG: 800 TABLET ORAL at 17:17

## 2019-10-08 RX ADMIN — ONDANSETRON 8 MG: 2 INJECTION INTRAMUSCULAR; INTRAVENOUS at 14:30

## 2019-10-08 RX ADMIN — DIPHENHYDRAMINE HYDROCHLORIDE 25 MG: 25 CAPSULE ORAL at 21:10

## 2019-10-08 RX ADMIN — ACETAMINOPHEN 650 MG: 325 TABLET, FILM COATED ORAL at 00:02

## 2019-10-08 RX ADMIN — POTASSIUM CHLORIDE 20 MEQ: 20 TABLET, EXTENDED RELEASE ORAL at 08:20

## 2019-10-08 RX ADMIN — DULOXETINE 30 MG: 30 CAPSULE, DELAYED RELEASE ORAL at 08:20

## 2019-10-08 RX ADMIN — Medication 10 ML: at 20:07

## 2019-10-08 RX ADMIN — DIPHENHYDRAMINE HYDROCHLORIDE 25 MG: 25 CAPSULE ORAL at 00:03

## 2019-10-08 RX ADMIN — PRAVASTATIN SODIUM 10 MG: 20 TABLET ORAL at 21:10

## 2019-10-08 RX ADMIN — NYSTATIN: 100000 CREAM TOPICAL at 08:21

## 2019-10-08 RX ADMIN — VORICONAZOLE 200 MG: 200 TABLET, FILM COATED ORAL at 20:06

## 2019-10-08 NOTE — PROGRESS NOTES
Infectious Disease Progress Note Today's Date: 10/8/2019 Admit Date: 2019 Impression: · Neutropenic fever: resolved on  after start of meropenem; did not respond to earlier coverage. Decadron started 10/1, fever had improved prior. · No localizing symptoms, unclear etiology - drug reaction vs AML vs infection. CT negative. BC/Ucx negative to date. Mild diarrhea: C.diff negative. CMV, Fungitell, Galactomannan: negative · Multiple bruises but also other areas on skin less clearly bruising. 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 Remains neutropenic and thrombocytopenic. · Erythematous left shoulder macular lesion; mildly indurated; location would be IM injection site favorable but has not had one that she can remember. It is clearly improving. Plan:  
· Continue Vancomycin thru today at least: day 7.  
· Continue ACV, voriconazole prophylaxis. · Left shoulder lesion is fading; remains mildly indurated. Extensive bruising on abd/flank and left breast and around port site. Anti-infectives: · Vanc -, 10/2- · Zosyn - · meropenem  - 10/7 · Voriconazole - current · Acyclovir prophylaxis · Levaquin prophylaxis 10/7- Subjective: Up walking in room; looks good. No complaints. No diarrhea. No pain. Feels port site and shoulder getting better No Known Allergies Review of Systems:  A comprehensive review of systems was negative except for that written in the History of Present Illness. Objective:  
 
Visit Vitals /64 (BP 1 Location: Left arm, BP Patient Position: Supine) Pulse 77 Temp 98.1 °F (36.7 °C) Resp 18 Ht 5' 6\" (1.676 m) Wt 87.8 kg (193 lb 8 oz) SpO2 96% Breastfeeding? No  
BMI 31.23 kg/m² Temp (24hrs), Av.2 °F (36.8 °C), Min:97.9 °F (36.6 °C), Max:98.7 °F (37.1 °C) Lines:  L chest port c/d/i Physical Exam:   
General:  Awake, alert; sitting up ; getting ready to shower Eyes:  Sclera anicteric. Pupils equally round and reactive to light. Mouth/Throat: Mucous membranes normal, oral pharynx clear Neck: Supple and symmetric Lungs:   Clear to auscultation bilaterally, good effort CV:  Regular rate and rhythm, no murmur, click, rub or gallop Abdomen:   Soft, non-tender. bowel sounds normal; non-distended Extremities: No cyanosis or edema Skin: Large bruise on left flank, left breast and on arms; mildly indurated pink area on left shoulder; not tender or fluctuant Musculoskeletal: No swelling or deformity Lines/Devices:  Intact, no erythema, drainage or tenderness; local bruising/hyperpigmentation Psych: Alert and oriented, normal mood affect given the setting Data Review: CBC: 
Recent Labs 10/08/19 
3037 10/07/19 
0110 10/06/19 
0216 WBC 0.5* 0.5* 0.5* GRANS 2*  --   -- MONOS 2*  --   --   
EOS 0*  --   --   
ANEU 0.0*  --   --   
ABL 0.5  --   --   
HGB 6.8* 7.1* 7.5* HCT 20.3* 21.4* 22.8*  
PLT 19* 24* 33* BMP: 
Recent Labs 10/08/19 
6731 10/07/19 
1010 10/06/19 
0216 CREA 0.70 0.63 0.70 BUN 18 17 18  147* 142  
K 4.4 4.3 4.5  
* 112* 108* CO2 28 29 30 AGAP 6* 6* 4*  
GLU 84 85 95 LFTS: 
Recent Labs 10/08/19 
3298 10/07/19 
3783 10/06/19 
0216 TBILI 0.2 0.3 0.3 ALT 23 21 25 SGOT 21 23 20 AP 96 96 93  
TP 6.3 6.1* 6.4 ALB 2.7* 2.7* 2.8* Microbiology:  
 
All Micro Results Procedure Component Value Units Date/Time CULTURE, BLOOD [[id number]] Collected:  09/26/19 0753 Order Status:  Completed Specimen:  Blood Updated:  10/01/19 5302 Special Requests: SINGLE PORT Culture result: NO GROWTH 5 DAYS     
 CULTURE, BLOOD [[id number]] Collected:  09/26/19 1050 Order Status:  Completed Specimen:  Blood Updated:  10/01/19 6228 Special Requests: --     
  LEFT 
FOREARM Culture result: NO GROWTH 5 DAYS     
 CULTURE, BLOOD [[id number]] Collected:  09/24/19 2160 Order Status:  Completed Specimen:  Blood Updated:  09/29/19 1107 Special Requests: LATERAL PORT Culture result: NO GROWTH 5 DAYS     
 CULTURE, BLOOD [[id number]] Collected:  09/24/19 1530 Order Status:  Completed Specimen:  Blood Updated:  09/29/19 1107 Special Requests: --     
  LEFT 
HAND Culture result: NO GROWTH 5 DAYS     
 CMV BY PCR, QT [[id number]] Collected:  09/25/19 2016 Order Status:  Completed Specimen:  Blood from Plasma Updated:  09/28/19 2035 CMV IU/mL NEGATIVE  IU/mL Comment: (NOTE) No CMV DNA detected. The quantitative range of this assay is 200 to 1 million IU/mL. This test was developed and its performance characteristics 
determined by [facility]. It has not been cleared or approved by the 
Food and Drug Administration. The FDA has determined that such 
clearance or approval is not necessary. Performed At: [address] [id number] Yury Gayle MD TV:[id number] CMV log 10 IU/mL TEST NOT PERFORMED log10 IU/mL Comment: (NOTE) Unable to calculate result since non-numeric result obtained for 
component test. 
  
  
 C. DIFFICILE AG & TOXIN A/B [[id number]] Collected:  09/25/19 1703 Order Status:  Completed Specimen:  Stool Updated:  09/26/19 1203 [address] ANTIGEN    
  C. DIFFICILE GDH ANTIGEN-NEGATIVE  
     
  C. difficile toxin C. DIFFICILE TOXIN-NEGATIVE  
     
  PCR Reflex NOT APPLICABLE INTERPRETATION    
  NEGATIVE FOR TOXIGENIC C. DIFFICILE Clinical Consideration NEGATIVE FOR TOXIGENIC C. DIFFICILE  
     
 CULTURE, BLOOD [[id number]] Collected:  09/21/19 1903 Order Status:  Completed Specimen:  Blood Updated:  09/26/19 5697 Special Requests: --     
  LEFT Antecubital 
  
  Culture result: NO GROWTH 5 DAYS     
 CULTURE, BLOOD [[id number]] Collected:  09/21/19 1850 Order Status:  Completed Specimen:  Blood Updated:  09/26/19 6384 Special Requests: --     
  LEFT 
PORT Culture result: NO GROWTH 5 DAYS     
 CULTURE, URINE [[id number]] Collected:  09/21/19 2039 Order Status:  Completed Specimen:  Urine from Clean catch Updated:  09/24/19 0700 Special Requests: NO SPECIAL REQUESTS Culture result:    
  <10,000 COLONIES/mL MIXED SKIN REGGIE ISOLATED Imaging:  
CT chest/abd/pelv (9/27/19) IMPRESSION:  
1. Trace bilateral pleural effusions with a trace pericardial effusion. 2.  No other acute pathology identified. Other incidental findings as above. Signed By: Sanjana Wang MD   
 October 8, 2019

## 2019-10-08 NOTE — PROGRESS NOTES
[facility] Hematology & Oncology Inpatient Hematology / Oncology Progress Note Admission Date: 2019  6:30 PM 
Reason for Admission/Hospital Course: Febrile neutropenia ([address] [D70.9, R50.81] 24 Hour Events: 
Afebrile, VSS D8 of Dacogen - resumed gilteritinib 10/6 Doing well, no complaints L shoulder cellulitis much improved ROS: 
Constitutional: negative for fever, chills, weakness, malaise, fatigue. CV:  negative for chest pain, palpitations, edema. Respiratory:  negative for dyspnea, cough, wheezing. GI: negative for nausea, abdominal pain. 10 point review of systems is otherwise negative with the exception of the elements mentioned above in the HPI. No Known Allergies OBJECTIVE: 
Patient Vitals for the past 8 hrs: 
 BP Temp Pulse Resp SpO2  
10/08/19 1229 111/52 97.5 °F (36.4 °C) 89 18 97 % 10/08/19 1132 125/58 97.4 °F (36.3 °C) 82 18 97 % 10/08/19 0749 149/64 98.1 °F (36.7 °C) 77 18 96 % Temp (24hrs), Av.9 °F (36.6 °C), Min:97.4 °F (36.3 °C), Max:98.5 °F (36.9 °C) No intake/output data recorded. Physical Exam: 
Constitutional: Well developed, well nourished female in no acute distress, sitting comfortably on the bedside bench. HEENT: Normocephalic and atraumatic. Oropharynx is clear, mucous membranes are moist.  Neck supple. Patchy alopecia. Skin +large bruise to right flank from previous fall, red indurated area to left upper posterior shoulder, smaller per MD but still erythematous. Warm and dry. + pallor. Linear erythematous patch to L upper chest, below port. Multiple bruises to BUE noted. Respiratory Lungs clear, normal air exchange without accessory muscle use. CVS Normal rate, regular rhythm and normal S1 and S2. No murmurs, gallops, or rubs. Abdomen Soft, nontender and nondistended, normoactive bowel sounds. No palpable mass. No hepatosplenomegaly. Neuro Grossly nonfocal with no obvious sensory or motor deficits. MSK Normal range of motion in general. Trace BLE edema and no tenderness. Psych Appropriate mood and affect. Labs: 
   
Recent Labs 10/08/19 
1000 10/07/19 
3624 10/06/19 
0216 WBC 0.5* 0.5* 0.5* RBC 2.28* 2.40* 2.57* HGB 6.8* 7.1* 7.5* HCT 20.3* 21.4* 22.8* MCV 89.0 89.2 88.7 MCH 29.8 29.6 29.2 MCHC 33.5 33.2 32.9 RDW 12.8 12.9 13.1 PLT 19* 24* 33* GRANS 2*  --   --   
LYMPH 96*  --   -- MONOS 2*  --   --   
EOS 0*  --   --   
BASOS 0  --   --   
IG 0  --   --   
DF AUTOMATED MANUAL MANUAL ANEU 0.0*  --   --   
ABL 0.5  --   --   
ABM 0.0*  --   --   
ALEKSANDR 0.0  --   --   
ABB 0.0  --   --   
AIG 0.0  --   --   
 
  
Recent Labs 10/08/19 
1155 10/07/19 
0141 10/06/19 
0216  147* 142  
K 4.4 4.3 4.5  
* 112* 108* CO2 28 29 30 AGAP 6* 6* 4*  
GLU 84 85 95 BUN 18 17 18 CREA 0.70 0.63 0.70 GFRAA >60 >60 >60 GFRNA >60 >60 >60  
CA 8.2* 8.2* 8.8 SGOT 21 23 20 AP 96 96 93  
TP 6.3 6.1* 6.4 ALB 2.7* 2.7* 2.8*  
GLOB 3.6* 3.4 3.6* AGRAT 0.8* 0.8* 0.8* MG  --   --  2.3 Imaging: XR CHEST SNGL V [[id number]] Collected: 09/29/19 1217 Order Status: Completed Updated: 09/29/19 1220 Narrative:    
Portable chest x-ray CLINICAL INDICATION: Crackles on physical exam 
 
FINDINGS: Single AP view the chest compared to a similar exam dated 9/24/2019 
shows the lungs to be slightly underexpanded. No airspace consolidation noted. No jens pulmonary edema. The cardiac silhouette and mediastinum are stable. A 
left-sided chest port is in place. Impression:    
IMPRESSION: Slightly underexpanded lungs, otherwise no acute abnormality. CT CHEST ABD PELV W CONT [[id number]] Collected: 09/27/19 1632 Order Status: Completed Updated: 09/27/19 1641 Narrative:    
CT CHEST ABDOMEN AND PELVIS WITH CONTRAST HISTORY: fevers, SOB, new O2, 73 years Female  Neutropenic fever Possible infection Leukemia COMPARISON: Chest radiograph September 24, 2019 TECHNIQUE:  Oral contrast was administered.  100 cc of nonionic intravenous 
contrast was injected, and axial helical CT images were obtained from the 
thoracic inlet through the pelvis. Coronal reformatted images were obtained at 
the scanner console and made available for review.  Radiation dose reduction 
techniques were used for this study:  Our CT scanners use one or all of the 
following: Automated exposure control, adjustment of the mA and/or kVp according 
to patient's size, iterative reconstruction. FINDINGS: 
 
CHEST: 
Partially visualized thyroid unremarkable.  No evidence of significant 
mediastinal, hilar, or axillary lymphadenopathy.  Minimal calcific 
atherosclerosis of a normal caliber aortic arch and descending aorta.  Left 
chest wall nelli catheter appears to remain in anatomic position.  Trace 
pericardial effusion.  Trace bilateral pleural effusions with associated mild 
dependent subsegmental atelectasis bilateral lung bases.  Visualized proximal 
airways unremarkable. ABDOMEN: 
Normal-appearing liver, gallbladder, spleen, pancreas, bilateral adrenal glands. 
 Small simple appearing right renal interpolar region cortical cyst measuring 2.4 cm in diameter.  Small nonobstructing right renal medullary level calculus 
measuring 2 mm in diameter.  Subcentimeter left renal cortical hypoenhancing 
lesions which are too small to characterize but probably cysts.  Mild calcific 
atherosclerosis of a normal caliber abdominal aorta.  No evidence of significant 
lymphadenopathy.  Normal-appearing small bowel.  No evidence of intraperitoneal 
free air or free fluid.  Image quality somewhat degraded by respiratory motion 
artifact.  
 
PELVIS: 
Normal-appearing urinary bladder.  Atrophic appearing uterus and bilateral 
adnexa with a coarse calcification of the uterine fundus which may represent a 
 calcified fibroid.   Normal-appearing colon and partially visualized appendix. Trace pelvic free fluid, may be physiologic.  No evidence of significant 
inguinal or pelvic sidewall lymphadenopathy.  Visualized osseous structures 
unremarkable.    
Impression:    
IMPRESSION: 1.  Trace bilateral pleural effusions with a trace pericardial effusion. 2.  No other acute pathology identified.  Other incidental findings as above. XR CHEST PA LAT [[id number]] Collected: 09/24/19 1617 Order Status: Completed Updated: 09/24/19 1620 Narrative:    
CHEST X-RAY, 2 views 9/24/2019 History: Tachypnea and fever. Technique: PA and lateral views of the chest.  
 
Comparison: Chest x-ray 9/21/2019 Findings: A stable left-sided venous port is seen. The cardiac silhouette is mildly 
enlarged although stable.  The lungs are expanded without evidence for 
pneumothorax.  No evolving consolidation, or evidence of pleural effusion is 
seen. The bony thorax demonstrates no acute changes.  The upper abdomen is 
unremarkable in appearance. Impression:    
IMPRESSION:  
1.  Stable cardiomegaly without evolving acute changes evident by plain film 
imaging. XR CHEST PA LAT [[id number]] Collected: 09/21/19 2042 Order Status: Completed Updated: 09/21/19 2047 Narrative:    
EXAM: Chest x-ray. INDICATION: Febrile neutropenia. COMPARISON: May 18, 2019. TECHNIQUE: Frontal and lateral x-rays of the chest were obtained. FINDINGS: The lungs are clear except for minimal linear atelectasis or scarring 
in the lateral left costophrenic angle. The cardiac size, mediastinal contour 
and pulmonary vasculature are within normal limits. No pneumothorax or pleural 
effusion is seen. A left chest wall infusion port catheter remains in place. Impression:    
IMPRESSION: No acute process. Medications: 
Current Facility-Administered Medications Medication Dose Route Frequency  0.9% sodium chloride infusion 250 mL  250 mL IntraVENous PRN  
 [START ON 10/9/2019] Vancomycin Trough Level Reminder   Other ONCE  
 levoFLOXacin (LEVAQUIN) tablet 500 mg  500 mg Oral Q24H  
 vancomycin (VANCOCIN) 1,000 mg in 0.9% sodium chloride (MBP/ADV) 250 mL  1,000 mg IntraVENous Q12H  
 HYDROcodone-acetaminophen (NORCO) 5-325 mg per tablet 1 Tab  1 Tab Oral Q6H PRN  
 acetaminophen (TYLENOL) tablet 650 mg  650 mg Oral Q4H PRN  
 morphine injection 2 mg  2 mg IntraVENous Q4H PRN  
 0.9% sodium chloride infusion 250 mL  250 mL IntraVENous PRN  
 gilteritinib tab 120 mg (Patient Supplied)  120 mg Oral PCL  
 0.9% sodium chloride infusion 250 mL  250 mL IntraVENous PRN  
 nystatin (MYCOSTATIN) 100,000 unit/gram cream   Topical BID  calcium carbonate (TUMS) chewable tablet 200 mg [elemental]  200 mg Oral TID PRN  
 ondansetron (ZOFRAN) injection 8 mg  8 mg IntraVENous Q24H  
 decitabine (DACOGEN) 41 mg in 0.9% sodium chloride 100 mL chemo infusion  41 mg IntraVENous Q24H  
 diphenhydrAMINE (BENADRYL) capsule 25 mg  25 mg Oral Q6H PRN  
 loperamide (IMODIUM) capsule 2 mg  2 mg Oral Q4H PRN  
 diphenoxylate-atropine (LOMOTIL) tablet 2 Tab  2 Tab Oral QID PRN  
 voriconazole (VFEND) tablet 200 mg  200 mg Oral Q12H  potassium chloride (K-DUR, KLOR-CON) SR tablet 20 mEq  20 mEq Oral BID  polyethylene glycol (MIRALAX) packet 17 g  17 g Oral DAILY PRN  
 sodium chloride (NS) flush 5-10 mL  5-10 mL IntraVENous PRN  
 DULoxetine (CYMBALTA) capsule 30 mg  30 mg Oral DAILY  LORazepam (ATIVAN) tablet 1 mg  1 mg Oral QHS  pravastatin (PRAVACHOL) tablet 10 mg  10 mg Oral QHS  zolpidem (AMBIEN) tablet 5 mg  5 mg Oral QHS PRN  
 acyclovir (ZOVIRAX) tablet 400 mg  400 mg Oral BID  influenza vaccine 2019-20 (6 mos+)(PF) (FLUARIX/FLULAVAL/FLUZONE QUAD) injection 0.5 mL  0.5 mL IntraMUSCular PRIOR TO DISCHARGE ASSESSMENT: 
 
Problem List  Date Reviewed: 9/19/2019 Codes Class Noted * (Principal) Febrile neutropenia (HCC) ICD-10-CM: D70.9, R50.81 ICD-9-CM: 288.00, 780.61  9/21/2019 Pancytopenia due to antineoplastic chemotherapy [facility]) ICD-10-CM: D61.810, T45.1X5A 
ICD-9-CM: 284.11, E933.1  6/12/2019 Cellulitis of neck ICD-10-CM: V13.461 ICD-9-CM: 682.1  6/12/2019 Immunocompromised status associated with infection ICD-10-CM: B99.9 ICD-9-CM: 136.9  6/12/2019 Port or reservoir infection ICD-10-CM: N20.343A ICD-9-CM: 999.33  6/12/2019 Acute myeloid leukemia not having achieved remission ([facility] ICD-10-CM: C92.00 ICD-9-CM: 205.00  5/9/2019 Admission for antineoplastic chemotherapy ICD-10-CM: Z51.11 ICD-9-CM: V58.11  5/5/2019 AML (acute myeloblastic leukemia) ([facility] ICD-10-CM: C92.00 ICD-9-CM: 205.00  4/28/2019 Weakness generalized ICD-10-CM: R53.1 ICD-9-CM: 780.79  4/28/2019 Pancytopenia ([facility] ICD-10-CM: O40.145 ICD-9-CM: 284.19  4/28/2019 [facility]ocyt[facility] ICD-10-CM: D69.6 ICD-9-CM: 287.5  4/27/2019 Ms. Jaden Bauer is a 68year old female who was admitted on 9/21/2019 for neutropenic fever. She has been having intermittent low grade fevers over the last week. She came to infusion yesterday for a blood transfusion, and after going home she had a fever of 102.5 and was sent to the ER for evaluation. She is a known patient of Dr. Jose Fortune with AML. Initially treated with induction 7+3 and Midostaurin. Most recently on Decitabine plus Gilteritinib with cycle 2 beginning on 8/26/2019 and cycle 3 due 9/23/2019. CXR negative. UA clear. BC/UC NTD. Started on zosyn and vanc. PLAN: 
AML 
-On Dacogen/Gilteritinib with most recent cycle 2 on 8/26/2019 with cycle 3 due 9/23/2019 9/23 Discuss BMbx flow results with patient-persistent AML-58% blasts. Still waiting on final pathology. 9/24 Bone marrow biopsy final path still pending. 9/30 Plan to restart Dacogen while inpatient for 10 days. 10/1 Dacogen to restart today. Plan to start Gilteritinib on D5 if she remains afebrile. 10/6 Day 6 of Dacogen. Tolerating well. Resumed Gilteritinib yesterday 10/8 Day 8 Dacogen. Con't Gilteritinib. Tolerating well. 
  
Neutropenic Fever 
-UA clear, BC/UC NTD 
-On Zosyn/Vanc empirically 9/23 Tmax 100.8. Feeling better today. Day 2 zosyn and vanc. BC/UC NTD 
9/24 Tmax 100.6. Day 3 vanc/zosyn. BC/UC still NGTD.  
9/25 Check aspergillus, fungitell, CMV. Add antifungal - vfend. CXR and BCx repeated yesterday and negative 9/26 BCx remain negative. Repeated this morning. Cdiff negative. D/c vancomycin. Continue zosyn, acyclovir, voriconazole. Consult ID for additional recommendations. Of note, gilteritinib can cause fevers in 13-35% of patients. Unclear timeline for febrile episodes? Will try to contact rep to discuss 9/27 Appreciate ID support. Changed to merrem. Given shortness of breath this morning, will proceed with CT CAP to eval for source. Could still be from drug vs disease as well 9/28 CT CAP negative for source of infection, PE. Did show trace pleural/pericardial effusion. Continue antibiotics. At this point, this may likely be drug or disease related fevers. Will hold gilteritinib and monitor fever curve 9/29 Gilteritinib remains on hold secondary to fevers 9/30 .8 overnight. Discussed with ID and okay to restart Dacogen, thorough workup and fevers improved overall. 10/1 Afebrile overnight and today thus far. Continuing on Merrem. 10/2 Remains afebrile. Merrem continues/ID following. 10/6 Remains afebrile. BCx-NGTD. On Merrem/Vanc. Erythema on L shoulder much improved. 10/7 per ID stopping Merrem. Return to Levaquin prophylaxix and follow response. Continue ACV, voriconazole. Left shoulder lesion fading. Continue vanc at least 5-7 days coverage pending response/counts Diarrhea 9/26 Cdiff negative. Add imodium/lomotil PRN 
10/7 diarrhea resolved Pancytopenia related to chemo/disease - Transfuse prn per [facility] SOPs 
10/4 Transfuse plt today 10/7 Transfuse per [facility] SOPs Dyspnea 9/27 BNP elevated at 458. Check echo. DC fluids. Lasix x 1 
9/28 Repeat BNP tomorrow AM. Lasix after blood today 9/29 Dyspneic with exertion. Repeat lasix after platelets. Echo still pending. Repeat CXR. Noted crackles to left base 9/30 TORREZ improving slowly. Repeat Lasix 20 mg today. 10/1 TORREZ improving. Repeat Lasix today 20 mg x 1.   
10/2 TORREZ greatly improved. No further lasix today. 10/7 dyspnea resolved Left shoulder ? Skin infection 10/2 Vanc started for redness/slight swelling/induration to left shoulder. Questionably related to fall but does not have the same appearance as bruise to right flank/side. Vanc started today. 10/4 L shoulder erythema improved. Con't Vanc. 10/6 ID recommends DC'ing Vanc, however area appears much improved on Vanc. Will continue Vanc for a couple more days d/t significant improvement of L shoulder cellulitis. 10/7 per ID Left shoulder lesion fading. Continue vanc at least 5-7 days coverage pending response/counts Goals and plan of care reviewed with the patient. All questions answered to the best of our ability. Disposition:  Anticipate discharge home on Thursday, 10/10. She will need twice weekly labs and weekly provider visits. Benita Pope NP [facility] [id number] [place] [address] Office : [phone] Fax : [phone]

## 2019-10-08 NOTE — PROGRESS NOTES
Problem: Falls - Risk of 
Goal: *Absence of Falls Description Document Abel Perez Fall Risk and appropriate interventions in the flowsheet. Outcome: Progressing Towards Goal 
Note:  
Fall Risk Interventions: 
Mobility Interventions: Strengthening exercises (ROM-active/passive) Medication Interventions: Teach patient to arise slowly Elimination Interventions: Call light in reach History of Falls Interventions: Evaluate medications/consider consulting pharmacy

## 2019-10-08 NOTE — PROGRESS NOTES
EOS Note:  
 
VSS, Afebrile Pain: No c/o pain during the shift. Neuro: A&Ox4. No c/o numbness or tingling. Cardio: S1/S2. SR. CV VS WNL for specified parameters. Resp: RA. Clear bilaterally. Symmetric chest wall excursion. Resp VS WNL. GI/: Voiding. Urine is yellow and clear. Last BM: 10/8/19; small. Stool is brown and formed. No c/o N/V. Diet: Tolerating diet. Adequate intake noted. See I&O for further details. Ambulation: Pt ambulates independently. Up walking hallways. No falls during the shift. Additional Information: 1 UPRBC's transfused. 8/10 Dacogen infused without complications. Trace non-pitting edema to ankles bilaterally. Patient resting quietly without complaint.  at bedside. Continue with POC.

## 2019-10-09 LAB
ABO + RH BLD: NORMAL
ALBUMIN SERPL-MCNC: 2.8 G/DL (ref 3.2–4.6)
ALBUMIN/GLOB SERPL: 0.8 {RATIO} (ref 1.2–3.5)
ALP SERPL-CCNC: 98 U/L (ref 50–136)
ALT SERPL-CCNC: 24 U/L (ref 12–65)
ANION GAP SERPL CALC-SCNC: 5 MMOL/L (ref 7–16)
AST SERPL-CCNC: 22 U/L (ref 15–37)
BASOPHILS # BLD: 0 K/UL (ref 0–0.2)
BASOPHILS NFR BLD: 0 % (ref 0–2)
BILIRUB SERPL-MCNC: 0.3 MG/DL (ref 0.2–1.1)
BLD PROD TYP BPU: NORMAL
BLOOD GROUP ANTIBODIES SERPL: NORMAL
BPU ID: NORMAL
BUN SERPL-MCNC: 19 MG/DL (ref 8–23)
CALCIUM SERPL-MCNC: 8.4 MG/DL (ref 8.3–10.4)
CHLORIDE SERPL-SCNC: 110 MMOL/L (ref 98–107)
CO2 SERPL-SCNC: 28 MMOL/L (ref 21–32)
CREAT SERPL-MCNC: 0.69 MG/DL (ref 0.6–1)
CROSSMATCH RESULT,%XM: NORMAL
DIFFERENTIAL METHOD BLD: ABNORMAL
EOSINOPHIL # BLD: 0 K/UL (ref 0–0.8)
EOSINOPHIL NFR BLD: 0 % (ref 0.5–7.8)
ERYTHROCYTE [DISTWIDTH] IN BLOOD BY AUTOMATED COUNT: 12.9 % (ref 11.9–14.6)
GLOBULIN SER CALC-MCNC: 3.7 G/DL (ref 2.3–3.5)
GLUCOSE SERPL-MCNC: 88 MG/DL (ref 65–100)
HCT VFR BLD AUTO: 25.3 % (ref 35.8–46.3)
HGB BLD-MCNC: 8.3 G/DL (ref 11.7–15.4)
IMM GRANULOCYTES # BLD AUTO: 0 K/UL (ref 0–0.5)
IMM GRANULOCYTES NFR BLD AUTO: 0 % (ref 0–5)
LYMPHOCYTES # BLD: 0.6 K/UL (ref 0.5–4.6)
LYMPHOCYTES NFR BLD: 92 % (ref 13–44)
MCH RBC QN AUTO: 28.6 PG (ref 26.1–32.9)
MCHC RBC AUTO-ENTMCNC: 32.8 G/DL (ref 31.4–35)
MCV RBC AUTO: 87.2 FL (ref 79.6–97.8)
MONOCYTES # BLD: 0 K/UL (ref 0.1–1.3)
MONOCYTES NFR BLD: 4 % (ref 4–12)
NEUTS SEG # BLD: 0 K/UL (ref 1.7–8.2)
NEUTS SEG NFR BLD: 4 % (ref 43–78)
NRBC # BLD: 0 K/UL (ref 0–0.2)
PLATELET # BLD AUTO: 16 K/UL (ref 150–450)
PLATELET COMMENTS,PCOM: ABNORMAL
PMV BLD AUTO: 12 FL (ref 9.4–12.3)
POTASSIUM SERPL-SCNC: 4.7 MMOL/L (ref 3.5–5.1)
PROT SERPL-MCNC: 6.5 G/DL (ref 6.3–8.2)
RBC # BLD AUTO: 2.9 M/UL (ref 4.05–5.2)
RBC MORPH BLD: ABNORMAL
SODIUM SERPL-SCNC: 143 MMOL/L (ref 136–145)
SPECIMEN EXP DATE BLD: NORMAL
STATUS OF UNIT,%ST: NORMAL
UNIT DIVISION, %UDIV: 0
VANCOMYCIN TROUGH SERPL-MCNC: 18.5 UG/ML (ref 5–20)
WBC # BLD AUTO: 0.6 K/UL (ref 4.3–11.1)

## 2019-10-09 PROCEDURE — 74011250636 HC RX REV CODE- 250/636: Performed by: INTERNAL MEDICINE

## 2019-10-09 PROCEDURE — 99232 SBSQ HOSP IP/OBS MODERATE 35: CPT | Performed by: INTERNAL MEDICINE

## 2019-10-09 PROCEDURE — 36591 DRAW BLOOD OFF VENOUS DEVICE: CPT

## 2019-10-09 PROCEDURE — 80202 ASSAY OF VANCOMYCIN: CPT

## 2019-10-09 PROCEDURE — 74011250636 HC RX REV CODE- 250/636: Performed by: NURSE PRACTITIONER

## 2019-10-09 PROCEDURE — 80053 COMPREHEN METABOLIC PANEL: CPT

## 2019-10-09 PROCEDURE — 74011000258 HC RX REV CODE- 258: Performed by: NURSE PRACTITIONER

## 2019-10-09 PROCEDURE — 74011250637 HC RX REV CODE- 250/637: Performed by: NURSE PRACTITIONER

## 2019-10-09 PROCEDURE — 74011250637 HC RX REV CODE- 250/637: Performed by: INTERNAL MEDICINE

## 2019-10-09 PROCEDURE — 85025 COMPLETE CBC W/AUTO DIFF WBC: CPT

## 2019-10-09 PROCEDURE — 65270000029 HC RM PRIVATE

## 2019-10-09 RX ADMIN — ACYCLOVIR 400 MG: 800 TABLET ORAL at 08:33

## 2019-10-09 RX ADMIN — DULOXETINE 30 MG: 30 CAPSULE, DELAYED RELEASE ORAL at 08:33

## 2019-10-09 RX ADMIN — DIPHENHYDRAMINE HYDROCHLORIDE 25 MG: 25 CAPSULE ORAL at 21:07

## 2019-10-09 RX ADMIN — VORICONAZOLE 200 MG: 200 TABLET, FILM COATED ORAL at 08:33

## 2019-10-09 RX ADMIN — Medication 10 ML: at 21:08

## 2019-10-09 RX ADMIN — VANCOMYCIN HYDROCHLORIDE 1000 MG: 1 INJECTION, POWDER, LYOPHILIZED, FOR SOLUTION INTRAVENOUS at 08:32

## 2019-10-09 RX ADMIN — POTASSIUM CHLORIDE 20 MEQ: 20 TABLET, EXTENDED RELEASE ORAL at 08:33

## 2019-10-09 RX ADMIN — PRAVASTATIN SODIUM 10 MG: 20 TABLET ORAL at 21:08

## 2019-10-09 RX ADMIN — ACYCLOVIR 400 MG: 800 TABLET ORAL at 18:28

## 2019-10-09 RX ADMIN — NYSTATIN: 100000 CREAM TOPICAL at 08:34

## 2019-10-09 RX ADMIN — LORAZEPAM 1 MG: 1 TABLET ORAL at 21:08

## 2019-10-09 RX ADMIN — ONDANSETRON 8 MG: 2 INJECTION INTRAMUSCULAR; INTRAVENOUS at 14:54

## 2019-10-09 RX ADMIN — ACETAMINOPHEN 650 MG: 325 TABLET, FILM COATED ORAL at 21:07

## 2019-10-09 RX ADMIN — DECITABINE 41 MG: 50 INJECTION, POWDER, LYOPHILIZED, FOR SOLUTION INTRAVENOUS at 14:57

## 2019-10-09 RX ADMIN — LEVOFLOXACIN 500 MG: 500 TABLET, FILM COATED ORAL at 11:52

## 2019-10-09 RX ADMIN — POTASSIUM CHLORIDE 20 MEQ: 20 TABLET, EXTENDED RELEASE ORAL at 18:29

## 2019-10-09 RX ADMIN — VORICONAZOLE 200 MG: 200 TABLET, FILM COATED ORAL at 21:08

## 2019-10-09 NOTE — PROGRESS NOTES
EOS Note:  
  
VSS, Afebrile Pain: No c/o pain during the shift. Neuro: A&Ox4. No c/o numbness or tingling. Cardio: S1/S2. SR. CV VS WNL for specified parameters. Resp: RA. Clear bilaterally. Symmetric chest wall excursion. Resp VS WNL. GI/: Voiding. Urine is yellow and clear. Last BM: 10/9/19. Stool is brown and formed. No c/o N/V. Diet: Tolerating diet. Adequate intake noted. See I&O for further details. Ambulation: Pt ambulates independently. Up walking hallways. No falls during the shift.  
  
Additional Information: 9/10 Dacogen infused without complications. Vanc dc'd. ID s/o. Anticipating discharge tomorrow. Patient resting quietly without complaint.  at bedside. Continue with POC.

## 2019-10-09 NOTE — PROGRESS NOTES
END OF SHIFT NOTE: 
 
Intake/Output 10/08 1901 - 10/09 0700 In: 026 [P.O.:360; I.V.:295] Out: 1300 [Urine:1300] Voiding: YES Catheter: NO 
Drain:   
 
 
 
 
 
Stool:  0 occurrences. Stool Assessment Stool Color: Candy Barbone (10/05/19 2006) Stool Appearance: Soft; Formed (10/08/19 0800) Stool Amount: Small (10/05/19 2006) Stool Source/Status: Rectum (10/05/19 2006) Emesis:  0 occurrences. VITAL SIGNS Patient Vitals for the past 12 hrs: 
 Temp Pulse Resp BP SpO2  
10/09/19 0231 98.5 °F (36.9 °C) 73 16 136/65 95 % 10/08/19 2241 98.6 °F (37 °C) 73 18 142/68 98 % 10/08/19 1942 98.8 °F (37.1 °C) 80 18 125/71 97 % Pain Assessment Pain 1 Pain Scale 1: Numeric (0 - 10) (10/09/19 0230) Pain Intensity 1: 0 (10/09/19 0230) Patient Stated Pain Goal: 0 (10/09/19 0230) Pain Reassessment 1: Patient resting w/respiratory rate greater than 10 (10/08/19 2200) Pain Onset 1: this adm (10/08/19 2112) Pain Location 1: Leg (10/08/19 2112) Pain Orientation 1: Left;Right (10/08/19 2112) Pain Description 1: Aching (10/08/19 2112) Pain Intervention(s) 1: Medication (see MAR); Rest (10/08/19 2112) Ambulating Yes Additional Information:  
Been afebrile;no bleeding. Slept well overnight. Shift report given to oncoming nurse Gregoria RN at the bedside.  
 
Michael Bradshaw RN

## 2019-10-09 NOTE — PROGRESS NOTES
[facility] Hematology & Oncology Inpatient Hematology / Oncology Progress Note Admission Date: 2019  6:30 PM 
Reason for Admission/Hospital Course: Febrile neutropenia ([address] [D70.9, R50.81] 24 Hour Events: 
Afebrile, VSS D9 of Dacogen - resumed gilteritinib 10/6 Doing well, no complaints In good spirits ROS: 
Constitutional: negative for fever, chills, weakness, malaise, fatigue. CV:  negative for chest pain, palpitations, edema. Respiratory:  negative for dyspnea, cough, wheezing. GI: negative for nausea, abdominal pain. 10 point review of systems is otherwise negative with the exception of the elements mentioned above in the HPI. No Known Allergies OBJECTIVE: 
Patient Vitals for the past 8 hrs: 
 BP Temp Pulse Resp SpO2  
10/09/19 1209 133/71 98.6 °F (37 °C) 86 18 97 % 10/09/19 0806 118/52 97.9 °F (36.6 °C) 90 18 96 % Temp (24hrs), Av.5 °F (36.9 °C), Min:97.9 °F (36.6 °C), Max:98.8 °F (37.1 °C) 
 
10/09 07 - 10/09 1900 In: 250 [I.V.:250] Out: 700 [Urine:700] Physical Exam: 
Constitutional: Well developed, well nourished female in no acute distress, sitting comfortably in bed. HEENT: Normocephalic and atraumatic. Oropharynx is clear, mucous membranes are moist.  Neck supple. Patchy alopecia. Skin   Warm and dry. + pallor. Multiple bruises to BUE noted. Respiratory Lungs clear, normal air exchange without accessory muscle use. CVS Normal rate, regular rhythm and normal S1 and S2. No murmurs, gallops, or rubs. Abdomen Soft, nontender and nondistended, normoactive bowel sounds. No palpable mass. No hepatosplenomegaly. Neuro Grossly nonfocal with no obvious sensory or motor deficits. MSK Normal range of motion in general.   
Psych Appropriate mood and affect. Labs: 
   
Recent Labs 10/09/19 
3266 10/08/19 
4882 10/07/19 
6228 WBC 0.6* 0.5* 0.5* RBC 2.90* 2.28* 2.40* HGB 8.3* 6.8* 7.1*  
HCT 25.3* 20.3* 21.4*  
 MCV 87.2 89.0 89.2 MCH 28.6 29.8 29.6 MCHC 32.8 33.5 33.2 RDW 12.9 12.8 12.9 PLT 16* 19* 24* GRANS 4* 2*  --   
LYMPH 92* 96*  --   
MONOS 4 2*  --   
EOS 0* 0*  --   
BASOS 0 0  --   
IG 0 0  --   
DF AUTOMATED AUTOMATED MANUAL ANEU 0.0* 0.0*  -- ABL 0.6 0.5  --   
ABM 0.0* 0.0*  --   
ALEKSANDR 0.0 0.0  --   
ABB 0.0 0.0  --   
AIG 0.0 0.0  --   
 
  
Recent Labs 10/09/19 
0483 10/08/19 
3214 10/07/19 
8854  144 147* K 4.7 4.4 4.3 * 110* 112* CO2 28 28 29 AGAP 5* 6* 6*  
GLU 88 84 85 BUN 19 18 17 CREA 0.69 0.70 0.63 GFRAA >60 >60 >60 GFRNA >60 >60 >60  
CA 8.4 8.2* 8.2* SGOT 22 21 23 AP 98 96 96  
TP 6.5 6.3 6.1* ALB 2.8* 2.7* 2.7*  
GLOB 3.7* 3.6* 3.4 AGRAT 0.8* 0.8* 0.8* Imaging: XR CHEST SNGL V [[id number]] Collected: 09/29/19 1217 Order Status: Completed Updated: 09/29/19 1220 Narrative:    
Portable chest x-ray CLINICAL INDICATION: Crackles on physical exam 
 
FINDINGS: Single AP view the chest compared to a similar exam dated 9/24/2019 
shows the lungs to be slightly underexpanded. No airspace consolidation noted. No jens pulmonary edema. The cardiac silhouette and mediastinum are stable. A 
left-sided chest port is in place. Impression:    
IMPRESSION: Slightly underexpanded lungs, otherwise no acute abnormality. CT CHEST ABD PELV W CONT [[id number]] Collected: 09/27/19 1632 Order Status: Completed Updated: 09/27/19 1641 Narrative:    
CT CHEST ABDOMEN AND PELVIS WITH CONTRAST HISTORY: fevers, SOB, new O2, 73 years Female  Neutropenic fever Possible infection Leukemia COMPARISON: Chest radiograph September 24, 2019 TECHNIQUE:  Oral contrast was administered.  100 cc of nonionic intravenous 
contrast was injected, and axial helical CT images were obtained from the 
thoracic inlet through the pelvis. Coronal reformatted images were obtained at 
the scanner console and made available for review.  Radiation dose reduction techniques were used for this study:  Our CT scanners use one or all of the 
following: Automated exposure control, adjustment of the mA and/or kVp according 
to patient's size, iterative reconstruction. FINDINGS: 
 
CHEST: 
Partially visualized thyroid unremarkable.  No evidence of significant 
mediastinal, hilar, or axillary lymphadenopathy.  Minimal calcific 
atherosclerosis of a normal caliber aortic arch and descending aorta.  Left 
chest wall nelli catheter appears to remain in anatomic position.  Trace 
pericardial effusion.  Trace bilateral pleural effusions with associated mild 
dependent subsegmental atelectasis bilateral lung bases.  Visualized proximal 
airways unremarkable. ABDOMEN: 
Normal-appearing liver, gallbladder, spleen, pancreas, bilateral adrenal glands. 
 Small simple appearing right renal interpolar region cortical cyst measuring 2.4 cm in diameter.  Small nonobstructing right renal medullary level calculus 
measuring 2 mm in diameter.  Subcentimeter left renal cortical hypoenhancing 
lesions which are too small to characterize but probably cysts.  Mild calcific 
atherosclerosis of a normal caliber abdominal aorta.  No evidence of significant 
lymphadenopathy.  Normal-appearing small bowel.  No evidence of intraperitoneal 
free air or free fluid.  Image quality somewhat degraded by respiratory motion 
artifact. PELVIS: 
Normal-appearing urinary bladder.  Atrophic appearing uterus and bilateral 
adnexa with a coarse calcification of the uterine fundus which may represent a 
calcified fibroid.   Normal-appearing colon and partially visualized appendix. Trace pelvic free fluid, may be physiologic.  No evidence of significant 
inguinal or pelvic sidewall lymphadenopathy.  Visualized osseous structures 
unremarkable.    
Impression:    
IMPRESSION: 1.  Trace bilateral pleural effusions with a trace pericardial effusion. 2.  No other acute pathology identified.  Other incidental findings as above. XR CHEST PA LAT [[id number]] Collected: 09/24/19 1617 Order Status: Completed Updated: 09/24/19 1620 Narrative:    
CHEST X-RAY, 2 views 9/24/2019 History: Tachypnea and fever. Technique: PA and lateral views of the chest.  
 
Comparison: Chest x-ray 9/21/2019 Findings: A stable left-sided venous port is seen. The cardiac silhouette is mildly 
enlarged although stable.  The lungs are expanded without evidence for 
pneumothorax.  No evolving consolidation, or evidence of pleural effusion is 
seen. The bony thorax demonstrates no acute changes.  The upper abdomen is 
unremarkable in appearance. Impression:    
IMPRESSION:  
1.  Stable cardiomegaly without evolving acute changes evident by plain film 
imaging. XR CHEST PA LAT [[id number]] Collected: 09/21/19 2042 Order Status: Completed Updated: 09/21/19 2047 Narrative:    
EXAM: Chest x-ray. INDICATION: Febrile neutropenia. COMPARISON: May 18, 2019. TECHNIQUE: Frontal and lateral x-rays of the chest were obtained. FINDINGS: The lungs are clear except for minimal linear atelectasis or scarring 
in the lateral left costophrenic angle. The cardiac size, mediastinal contour 
and pulmonary vasculature are within normal limits. No pneumothorax or pleural 
effusion is seen. A left chest wall infusion port catheter remains in place. Impression:    
IMPRESSION: No acute process. Medications: 
Current Facility-Administered Medications Medication Dose Route Frequency  0.9% sodium chloride infusion 250 mL  250 mL IntraVENous PRN  
 central line flush (saline) syringe 10 mL  10 mL InterCATHeter PRN  
 levoFLOXacin (LEVAQUIN) tablet 500 mg  500 mg Oral Q24H  
 HYDROcodone-acetaminophen (NORCO) 5-325 mg per tablet 1 Tab  1 Tab Oral Q6H PRN  
 acetaminophen (TYLENOL) tablet 650 mg  650 mg Oral Q4H PRN  
  morphine injection 2 mg  2 mg IntraVENous Q4H PRN  
 gilteritinib tab 120 mg (Patient Supplied)  120 mg Oral PCL  nystatin (MYCOSTATIN) 100,000 unit/gram cream   Topical BID  calcium carbonate (TUMS) chewable tablet 200 mg [elemental]  200 mg Oral TID PRN  
 ondansetron (ZOFRAN) injection 8 mg  8 mg IntraVENous Q24H  
 decitabine (DACOGEN) 41 mg in 0.9% sodium chloride 100 mL chemo infusion  41 mg IntraVENous Q24H  
 diphenhydrAMINE (BENADRYL) capsule 25 mg  25 mg Oral Q6H PRN  
 loperamide (IMODIUM) capsule 2 mg  2 mg Oral Q4H PRN  
 diphenoxylate-atropine (LOMOTIL) tablet 2 Tab  2 Tab Oral QID PRN  
 voriconazole (VFEND) tablet 200 mg  200 mg Oral Q12H  potassium chloride (K-DUR, KLOR-CON) SR tablet 20 mEq  20 mEq Oral BID  polyethylene glycol (MIRALAX) packet 17 g  17 g Oral DAILY PRN  
 sodium chloride (NS) flush 5-10 mL  5-10 mL IntraVENous PRN  
 DULoxetine (CYMBALTA) capsule 30 mg  30 mg Oral DAILY  LORazepam (ATIVAN) tablet 1 mg  1 mg Oral QHS  pravastatin (PRAVACHOL) tablet 10 mg  10 mg Oral QHS  zolpidem (AMBIEN) tablet 5 mg  5 mg Oral QHS PRN  
 acyclovir (ZOVIRAX) tablet 400 mg  400 mg Oral BID  influenza vaccine 2019-20 (6 mos+)(PF) (FLUARIX/FLULAVAL/FLUZONE QUAD) injection 0.5 mL  0.5 mL IntraMUSCular PRIOR TO DISCHARGE ASSESSMENT: 
 
Problem List  Date Reviewed: 9/19/2019 Codes Class Noted * (Principal) Febrile neutropenia (HCC) ICD-10-CM: D70.9, R50.81 ICD-9-CM: 288.00, 780.61  9/21/2019 Pancytopenia due to antineoplastic chemotherapy [facility]) ICD-10-CM: D61.810, T45.1X5A 
ICD-9-CM: 284.11, E933.1  6/12/2019 Cellulitis of neck ICD-10-CM: R04.369 ICD-9-CM: 682.1  6/12/2019 Immunocompromised status associated with infection ICD-10-CM: B99.9 ICD-9-CM: 136.9  6/12/2019 Port or reservoir infection ICD-10-CM: D28.966I ICD-9-CM: 999.33  6/12/2019  Acute myeloid leukemia not having achieved remission ([facility] [address] ICD-10-CM: C92.00 ICD-9-CM: 205.00  5/9/2019 Admission for antineoplastic chemotherapy ICD-10-CM: Z51.11 ICD-9-CM: V58.11  5/5/2019 AML (acute myeloblastic leukemia) ([facility] ICD-10-CM: C92.00 ICD-9-CM: 205.00  4/28/2019 Weakness generalized ICD-10-CM: R53.1 ICD-9-CM: 780.79  4/28/2019 Pancytopenia ([facility] ICD-10-CM: A07.892 ICD-9-CM: 284.19  4/28/2019 Thrombocytopenia ([facility] ICD-10-CM: D69.6 ICD-9-CM: 287.5  4/27/2019 Ms. Ronald Guadarrama is a 68year old female who was admitted on 9/21/2019 for neutropenic fever. She has been having intermittent low grade fevers over the last week. She came to infusion yesterday for a blood transfusion, and after going home she had a fever of 102.5 and was sent to the ER for evaluation. 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 Initially treated with induction 7+3 and Midostaurin. Most recently on Decitabine plus Gilteritinib with cycle 2 beginning on 8/26/2019 and cycle 3 due 9/23/2019. CXR negative. UA clear. BC/UC NTD. Started on zosyn and vanc. PLAN: 
AML 
-On Dacogen/Gilteritinib with most recent cycle 2 on 8/26/2019 with cycle 3 due 9/23/2019 9/23 Discuss BMbx flow results with patient-persistent AML-58% blasts. Still waiting on final pathology. 9/24 Bone marrow biopsy final path still pending. 9/30 Plan to restart Dacogen while inpatient for 10 days. 10/1 Dacogen to restart today. Plan to start Gilteritinib on D5 if she remains afebrile. 10/6 Day 6 of Dacogen. Tolerating well. Resumed Gilteritinib yesterday 10/8 Day 8 Dacogen. Con't Gilteritinib. Tolerating well. 
  
Neutropenic Fever 
-UA clear, BC/UC NTD 
-On Zosyn/Vanc empirically 9/23 Tmax 100.8. Feeling better today. Day 2 zosyn and vanc. BC/UC NTD 
9/24 Tmax 100.6. Day 3 vanc/zosyn. BC/UC still NGTD.  
9/25 Check aspergillus, fungitell, CMV. Add antifungal - vfend. CXR and BCx repeated yesterday and negative 9/26 BCx remain negative. Repeated this morning. Cdiff negative. D/c vancomycin. Continue zosyn, acyclovir, voriconazole. Consult ID for additional recommendations. Of note, gilteritinib can cause fevers in 13-35% of patients. Unclear timeline for febrile episodes? Will try to contact rep to discuss 9/27 Appreciate ID support. Changed to merrem. Given shortness of breath this morning, will proceed with CT CAP to eval for source. Could still be from drug vs disease as well 9/28 CT CAP negative for source of infection, PE. Did show trace pleural/pericardial effusion. Continue antibiotics. At this point, this may likely be drug or disease related fevers. Will hold gilteritinib and monitor fever curve 9/29 Gilteritinib remains on hold secondary to fevers 9/30 .8 overnight. Discussed with ID and okay to restart Dacogen, thorough workup and fevers improved overall. 10/1 Afebrile overnight and today thus far. Continuing on Merrem. 10/2 Remains afebrile. Merrem continues/ID following. 10/6 Remains afebrile. BCx-NGTD. On Merrem/Vanc. Erythema on L shoulder much improved. 10/7 per ID stopping Merrem. Return to Levaquin prophylaxix and follow response. Continue ACV, voriconazole. Left shoulder lesion resolved Continue vanc at least 5-7 days coverage pending response/counts 10/9 off vanc per ID. Continue ACV, voriconazole prophylaxis. Diarrhea 9/26 Cdiff negative. Add imodium/lomotil PRN 
10/7 diarrhea resolved Pancytopenia related to chemo/disease - Transfuse prn per Alexander SOPs 
10/4 Transfuse plt today 10/7 Transfuse per Alexander SOPs Dyspnea 9/27 BNP elevated at 458. Check echo. DC fluids. Lasix x 1 
9/28 Repeat BNP tomorrow AM. Lasix after blood today 9/29 Dyspneic with exertion. Repeat lasix after platelets. Echo still pending. Repeat CXR. Noted crackles to left base 9/30 TORREZ improving slowly. Repeat Lasix 20 mg today. 10/1 TORREZ improving. Repeat Lasix today 20 mg x 1. 10/2 TORREZ greatly improved. No further lasix today. 10/7 dyspnea resolved Left shoulder ? Skin infection 10/2 Vanc started for redness/slight swelling/induration to left shoulder. Questionably related to fall but does not have the same appearance as bruise to right flank/side. Vanc started today. 10/4 L shoulder erythema improved. Con't Vanc. 10/6 ID recommends DC'ing Vanc, however area appears much improved on Vanc. Will continue Vanc for a couple more days d/t significant improvement of L shoulder cellulitis. 10/7 per ID Left shoulder lesion fading. Continue vanc at least 5-7 days coverage pending response/counts 10/9 resolved Goals and plan of care reviewed with the patient. All questions answered to the best of our ability. Disposition:  Anticipate discharge home on Thursday, 10/10. She will need twice weekly labs and weekly provider visits. Edis Das NP [facility] Hematology and Oncology [address] [place] [address] Office : [phone] Fax : [phone] Attending Addendum: 
Patient seen with NP. Pt with known AML on gilteritinib and decitabine D9. Admitted with fevers with negative workup. ID on board and recs appreciated. I personally performed a face to face diagnostic evaluation on this patient. My findings are as follows: A&ox3, lungs clear, heart regular, abdomen benign and no LE edema. She is in great spirits. Hoping to go home soon. C/w supportive care.  updated at bedside. I have reviewed and agree with the care plan. Marlo Joy MD 
[facility] Hematology and Oncology [address] [place] [address] Office : [phone] Fax : [phone]

## 2019-10-09 NOTE — PROGRESS NOTES
Problem: Falls - Risk of 
Goal: *Absence of Falls Description Document Jasvir Calvin Fall Risk and appropriate interventions in the flowsheet. Outcome: Progressing Towards Goal 
Note:  
Fall Risk Interventions: 
Mobility Interventions: Strengthening exercises (ROM-active/passive) Medication Interventions: Teach patient to arise slowly Elimination Interventions: Call light in reach History of Falls Interventions: Room close to nurse's station

## 2019-10-09 NOTE — PROGRESS NOTES
Pharmacokinetic Consult to Pharmacist 
 
Yelena Samy is a 68 y.o. female being treated for SSTI with vancomycin. Height: 5' 6\" (167.6 cm)  Weight: 85.8 kg (189 lb 1.6 oz) Lab Results Component Value Date/Time BUN 19 10/09/2019 02:24 AM  
 Creatinine 0.69 10/09/2019 02:24 AM  
 WBC 0.6 (LL) 10/09/2019 02:24 AM  
 Procalcitonin 0.1 09/21/2019 06:50 PM  
 Lactic Acid (POC) 0.67 09/21/2019 08:51 PM  
  
Estimated Creatinine Clearance: 79 mL/min (by C-G formula based on SCr of 0.69 mg/dL). Lab Results Component Value Date/Time Vancomycin,trough 18.5 10/09/2019 08:48 AM  
 
Day 8 of vancomycin. Goal trough is 10-20. Vancomycin trough resulted in the therapeutic range at 18.5. Continue current dose of 1000 mg IV q12h. Further levels will be ordered as clinically indicated. Pharmacy will continue to follow. Please call with any questions. Thank you, Duyen Zhong, PharmD Clinical Pharmacist 
[phone]

## 2019-10-09 NOTE — PROGRESS NOTES
Infectious Disease Progress Note Today's Date: 10/9/2019 Admit Date: 2019 Impression: · Neutropenic fever: resolved on  after start of meropenem; did not respond to earlier coverage. Decadron started 10/1, fever had improved prior. · No localizing symptoms, unclear etiology - drug reaction vs AML vs infection. CT negative. BC/Ucx negative to date. Mild diarrhea: C.diff negative. CMV, Fungitell, Galactomannan: negative · Multiple bruises but also other areas on skin less clearly bruising. 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 Remains neutropenic and thrombocytopenic. · Erythematous left shoulder macular lesion; mildly indurated; location would be IM injection site favorable but has not had one that she can remember. It is clearly improving. Plan:  
 
· Continue ACV, voriconazole prophylaxis. · **Will sign off at this time. Please call with questions or concerns. Anti-infectives: · Vanc -, 10/2- · Zosyn - · meropenem  - 10/7 · Voriconazole - current · Acyclovir prophylaxis · Levaquin prophylaxis 10/7- Subjective: No complaints, ready to go home tomorrow.  in room. Denies nausea, vomiting, diarrhea, fever, chills, or sweats No Known Allergies Review of Systems:  A comprehensive review of systems was negative except for that written in the History of Present Illness. Objective:  
 
Visit Vitals /52 (BP 1 Location: Left arm, BP Patient Position: Sitting) Pulse 90 Temp 97.9 °F (36.6 °C) Resp 18 Ht 5' 6\" (1.676 m) Wt 85.8 kg (189 lb 1.6 oz) SpO2 96% Breastfeeding? No  
BMI 30.52 kg/m² Temp (24hrs), Av.2 °F (36.8 °C), Min:97.4 °F (36.3 °C), Max:98.8 °F (37.1 °C) Lines:  L chest port c/d/i Physical Exam:   
General:  Awake, alert; sitting up ; getting ready to shower Eyes:  Sclera anicteric. Pupils equally round and reactive to light. Mouth/Throat: Mucous membranes normal, oral pharynx clear Neck: Supple and symmetric Lungs:   Clear to auscultation bilaterally, good effort CV:  Regular rate and rhythm, no murmur, click, rub or gallop Abdomen:   Soft, non-tender. bowel sounds normal; non-distended Extremities: No cyanosis or edema Skin: Large bruise on left flank, left breast and on arms; mildly indurated pink area on left shoulder; not tender or fluctuant Musculoskeletal: No swelling or deformity Lines/Devices:  Intact, no erythema, drainage or tenderness; local bruising/hyperpigmentation Psych: Alert and oriented, normal mood affect given the setting Data Review: CBC: 
Recent Labs 10/09/19 
9230 10/08/19 
5779 10/07/19 
8173 WBC 0.6* 0.5* 0.5* GRANS 4* 2*  --   
MONOS 4 2*  --   
EOS 0* 0*  --   
ANEU 0.0* 0.0*  -- ABL 0.6 0.5  --   
HGB 8.3* 6.8* 7.1*  
HCT 25.3* 20.3* 21.4*  
PLT 16* 19* 24* BMP: 
Recent Labs 10/09/19 
7653 10/08/19 
5755 10/07/19 
8373 CREA 0.69 0.70 0.63 BUN 19 18 17  144 147* K 4.7 4.4 4.3 * 110* 112* CO2 28 28 29 AGAP 5* 6* 6*  
GLU 88 84 85 LFTS: 
Recent Labs 10/09/19 
8963 10/08/19 
7867 10/07/19 
8167 TBILI 0.3 0.2 0.3 ALT 24 23 21 SGOT 22 21 23 AP 98 96 96  
TP 6.5 6.3 6.1* ALB 2.8* 2.7* 2.7* Microbiology:  
 
All Micro Results Procedure Component Value Units Date/Time CULTURE, BLOOD [[id number]] Collected:  09/26/19 0753 Order Status:  Completed Specimen:  Blood Updated:  10/01/19 5550 Special Requests: SINGLE PORT Culture result: NO GROWTH 5 DAYS     
 CULTURE, BLOOD [[id number]] Collected:  09/26/19 1050 Order Status:  Completed Specimen:  Blood Updated:  10/01/19 0766 Special Requests: --     
  LEFT 
FOREARM Culture result: NO GROWTH 5 DAYS     
 CULTURE, BLOOD [[id number]] Collected:  09/24/19 1536 Order Status:  Completed Specimen:  Blood Updated:  09/29/19 1100   Special Requests: LATERAL PORT     
 Culture result: NO GROWTH 5 DAYS     
 CULTURE, BLOOD [[id number]] Collected:  09/24/19 1530 Order Status:  Completed Specimen:  Blood Updated:  09/29/19 1107 Special Requests: --     
  LEFT 
HAND Culture result: NO GROWTH 5 DAYS     
 CMV BY PCR, QT [[id number]] Collected:  09/25/19 2016 Order Status:  Completed Specimen:  Blood from Plasma Updated:  09/28/19 2035 CMV IU/mL NEGATIVE  IU/mL Comment: (NOTE) No CMV DNA detected. The quantitative range of this assay is 200 to 1 million IU/mL. This test was developed and its performance characteristics 
determined by [employer]. It has not been cleared or approved by the 
Food and Drug Administration. The FDA has determined that such 
clearance or approval is not necessary. Performed At: [address] [id number] Kt Canela MD VA:[id number] CMV log 10 IU/mL TEST NOT PERFORMED log10 IU/mL Comment: (NOTE) Unable to calculate result since non-numeric result obtained for 
component test. 
  
  
 C. DIFFICILE AG & TOXIN A/B [[id number]] Collected:  09/25/19 1703 Order Status:  Completed Specimen:  Stool Updated:  09/26/19 1203 7007 Elena Lamar ANTIGEN    
  C. DIFFICILE GDH ANTIGEN-NEGATIVE  
     
  C. difficile toxin C. DIFFICILE TOXIN-NEGATIVE  
     
  PCR Reflex NOT APPLICABLE INTERPRETATION    
  NEGATIVE FOR TOXIGENIC C. DIFFICILE Clinical Consideration NEGATIVE FOR TOXIGENIC C. DIFFICILE  
     
 CULTURE, BLOOD [[id number]] Collected:  09/21/19 1903 Order Status:  Completed Specimen:  Blood Updated:  09/26/19 4105 Special Requests: --     
  LEFT Antecubital 
  
  Culture result: NO GROWTH 5 DAYS     
 CULTURE, BLOOD [[id number]] Collected:  09/21/19 1850 Order Status:  Completed Specimen:  Blood Updated:  09/26/19 3484 Special Requests: --     
  LEFT 
PORT Culture result: NO GROWTH 5 DAYS CULTURE, URINE [[id number]] Collected:  09/21/19 2039 Order Status:  Completed Specimen:  Urine from Clean catch Updated:  09/24/19 0700 Special Requests: NO SPECIAL REQUESTS Culture result:    
  <10,000 COLONIES/mL MIXED SKIN REGGIE ISOLATED Imaging:  
CT chest/abd/pelv (9/27/19) IMPRESSION:  
1. Trace bilateral pleural effusions with a trace pericardial effusion. 2.  No other acute pathology identified. Other incidental findings as above. Signed By: Jorge Killian NP October 9, 2019

## 2019-10-10 VITALS
OXYGEN SATURATION: 99 % | RESPIRATION RATE: 16 BRPM | HEIGHT: 66 IN | DIASTOLIC BLOOD PRESSURE: 63 MMHG | WEIGHT: 190.3 LBS | HEART RATE: 87 BPM | BODY MASS INDEX: 30.58 KG/M2 | SYSTOLIC BLOOD PRESSURE: 131 MMHG | TEMPERATURE: 98.4 F

## 2019-10-10 LAB
ALBUMIN SERPL-MCNC: 2.9 G/DL (ref 3.2–4.6)
ALBUMIN/GLOB SERPL: 0.8 {RATIO} (ref 1.2–3.5)
ALP SERPL-CCNC: 98 U/L (ref 50–136)
ALT SERPL-CCNC: 29 U/L (ref 12–65)
ANION GAP SERPL CALC-SCNC: 4 MMOL/L (ref 7–16)
AST SERPL-CCNC: 25 U/L (ref 15–37)
BASOPHILS # BLD: 0 K/UL (ref 0–0.2)
BASOPHILS NFR BLD: 0 % (ref 0–2)
BILIRUB SERPL-MCNC: 0.3 MG/DL (ref 0.2–1.1)
BUN SERPL-MCNC: 21 MG/DL (ref 8–23)
CALCIUM SERPL-MCNC: 8.8 MG/DL (ref 8.3–10.4)
CHLORIDE SERPL-SCNC: 109 MMOL/L (ref 98–107)
CO2 SERPL-SCNC: 28 MMOL/L (ref 21–32)
CREAT SERPL-MCNC: 0.75 MG/DL (ref 0.6–1)
DIFFERENTIAL METHOD BLD: ABNORMAL
EOSINOPHIL # BLD: 0 K/UL (ref 0–0.8)
EOSINOPHIL NFR BLD: 0 % (ref 0.5–7.8)
ERYTHROCYTE [DISTWIDTH] IN BLOOD BY AUTOMATED COUNT: 13 % (ref 11.9–14.6)
GLOBULIN SER CALC-MCNC: 3.5 G/DL (ref 2.3–3.5)
GLUCOSE SERPL-MCNC: 88 MG/DL (ref 65–100)
HCT VFR BLD AUTO: 24 % (ref 35.8–46.3)
HGB BLD-MCNC: 7.9 G/DL (ref 11.7–15.4)
IMM GRANULOCYTES # BLD AUTO: 0 K/UL (ref 0–0.5)
IMM GRANULOCYTES NFR BLD AUTO: 0 % (ref 0–5)
LYMPHOCYTES # BLD: 0.6 K/UL (ref 0.5–4.6)
LYMPHOCYTES NFR BLD: 94 % (ref 13–44)
MCH RBC QN AUTO: 28.8 PG (ref 26.1–32.9)
MCHC RBC AUTO-ENTMCNC: 32.9 G/DL (ref 31.4–35)
MCV RBC AUTO: 87.6 FL (ref 79.6–97.8)
MONOCYTES # BLD: 0 K/UL (ref 0.1–1.3)
MONOCYTES NFR BLD: 2 % (ref 4–12)
NEUTS SEG # BLD: 0 K/UL (ref 1.7–8.2)
NEUTS SEG NFR BLD: 4 % (ref 43–78)
NRBC # BLD: 0 K/UL (ref 0–0.2)
PLATELET # BLD AUTO: 10 K/UL (ref 150–450)
PLATELET COMMENTS,PCOM: ABNORMAL
PMV BLD AUTO: 10.5 FL (ref 9.4–12.3)
POTASSIUM SERPL-SCNC: 4.4 MMOL/L (ref 3.5–5.1)
PROT SERPL-MCNC: 6.4 G/DL (ref 6.3–8.2)
RBC # BLD AUTO: 2.74 M/UL (ref 4.05–5.2)
RBC MORPH BLD: ABNORMAL
SODIUM SERPL-SCNC: 141 MMOL/L (ref 136–145)
WBC # BLD AUTO: 0.6 K/UL (ref 4.3–11.1)
WBC MORPH BLD: ABNORMAL

## 2019-10-10 PROCEDURE — 74011250637 HC RX REV CODE- 250/637: Performed by: INTERNAL MEDICINE

## 2019-10-10 PROCEDURE — 74011250637 HC RX REV CODE- 250/637: Performed by: NURSE PRACTITIONER

## 2019-10-10 PROCEDURE — P9037 PLATE PHERES LEUKOREDU IRRAD: HCPCS

## 2019-10-10 PROCEDURE — 36591 DRAW BLOOD OFF VENOUS DEVICE: CPT

## 2019-10-10 PROCEDURE — 74011000258 HC RX REV CODE- 258: Performed by: NURSE PRACTITIONER

## 2019-10-10 PROCEDURE — 99239 HOSP IP/OBS DSCHRG MGMT >30: CPT | Performed by: INTERNAL MEDICINE

## 2019-10-10 PROCEDURE — 85025 COMPLETE CBC W/AUTO DIFF WBC: CPT

## 2019-10-10 PROCEDURE — 36430 TRANSFUSION BLD/BLD COMPNT: CPT

## 2019-10-10 PROCEDURE — 86644 CMV ANTIBODY: CPT

## 2019-10-10 PROCEDURE — 80053 COMPREHEN METABOLIC PANEL: CPT

## 2019-10-10 PROCEDURE — 74011250636 HC RX REV CODE- 250/636: Performed by: NURSE PRACTITIONER

## 2019-10-10 RX ORDER — LEVOFLOXACIN 500 MG/1
500 TABLET, FILM COATED ORAL EVERY 24 HOURS
Qty: 30 TAB | Refills: 0 | Status: SHIPPED | OUTPATIENT
Start: 2019-10-10 | End: 2019-10-31 | Stop reason: ALTCHOICE

## 2019-10-10 RX ORDER — VORICONAZOLE 200 MG/1
200 TABLET, FILM COATED ORAL EVERY 12 HOURS
Qty: 60 TAB | Refills: 0 | Status: ON HOLD | OUTPATIENT
Start: 2019-10-10 | End: 2019-12-12 | Stop reason: SDUPTHER

## 2019-10-10 RX ORDER — HEPARIN 100 UNIT/ML
500 SYRINGE INTRAVENOUS AS NEEDED
Status: DISCONTINUED | OUTPATIENT
Start: 2019-10-10 | End: 2019-10-10 | Stop reason: HOSPADM

## 2019-10-10 RX ORDER — SODIUM CHLORIDE 9 MG/ML
250 INJECTION, SOLUTION INTRAVENOUS AS NEEDED
Status: DISCONTINUED | OUTPATIENT
Start: 2019-10-10 | End: 2019-10-10 | Stop reason: HOSPADM

## 2019-10-10 RX ORDER — ACYCLOVIR 400 MG/1
400 TABLET ORAL 2 TIMES DAILY
Qty: 60 TAB | Refills: 0 | Status: ON HOLD | OUTPATIENT
Start: 2019-10-10 | End: 2019-12-12

## 2019-10-10 RX ADMIN — ACETAMINOPHEN 650 MG: 325 TABLET, FILM COATED ORAL at 09:43

## 2019-10-10 RX ADMIN — ACYCLOVIR 400 MG: 800 TABLET ORAL at 08:01

## 2019-10-10 RX ADMIN — ONDANSETRON 8 MG: 2 INJECTION INTRAMUSCULAR; INTRAVENOUS at 14:11

## 2019-10-10 RX ADMIN — VORICONAZOLE 200 MG: 200 TABLET, FILM COATED ORAL at 08:00

## 2019-10-10 RX ADMIN — NYSTATIN: 100000 CREAM TOPICAL at 08:01

## 2019-10-10 RX ADMIN — LEVOFLOXACIN 500 MG: 500 TABLET, FILM COATED ORAL at 13:10

## 2019-10-10 RX ADMIN — DECITABINE 41 MG: 50 INJECTION, POWDER, LYOPHILIZED, FOR SOLUTION INTRAVENOUS at 15:15

## 2019-10-10 RX ADMIN — DIPHENHYDRAMINE HYDROCHLORIDE 25 MG: 25 CAPSULE ORAL at 09:43

## 2019-10-10 RX ADMIN — DULOXETINE 30 MG: 30 CAPSULE, DELAYED RELEASE ORAL at 08:01

## 2019-10-10 RX ADMIN — POTASSIUM CHLORIDE 20 MEQ: 20 TABLET, EXTENDED RELEASE ORAL at 08:00

## 2019-10-10 NOTE — PROGRESS NOTES
END OF SHIFT NOTE: 
 
Intake/Output 10/09 1901 - 10/10 0700 In: 360 [P.O.:360] Out: 650 [Urine:650] Voiding: YES Catheter: NO 
Drain:   
 
 
 
 
 
Stool:  0 occurrences. Stool Assessment Stool Color: Elizabeth Hi (10/05/19 2006) Stool Appearance: Soft; Formed (10/09/19 0800) Stool Amount: Small (10/05/19 2006) Stool Source/Status: Rectum (10/05/19 2006) Emesis:  0 occurrences. VITAL SIGNS Patient Vitals for the past 12 hrs: 
 Temp Pulse Resp BP SpO2  
10/10/19 0253 97.6 °F (36.4 °C) 73 16 135/64 97 % 10/09/19 2258 98.1 °F (36.7 °C) 78 16 147/67 97 % 10/1945 98.7 °F (37.1 °C) 84 18 124/62 99 % 10/09/19 1646 98.7 °F (37.1 °C) 81 16 123/67 98 % Pain Assessment Pain 1 Pain Scale 1: Visual (10/10/19 0115) Pain Intensity 1: 0 (10/10/19 0115) Patient Stated Pain Goal: 0 (10/10/19 0115) Pain Reassessment 1: Yes (10/09/19 2200) Pain Onset 1: this adm (10/09/19 2107) Pain Location 1: Leg (10/09/19 2107) Pain Orientation 1: Left;Right (10/09/19 2107) Pain Description 1: Aching (10/09/19 2107) Pain Intervention(s) 1: Medication (see MAR); Environmental changes; Rest (10/09/19 2107) Ambulating Yes Additional Information: Afebrile;no bleeding;no new complaints. Day 10 of 10 chemo Dacogen today;D/C plans post chemo completion. For 1 unit platelet transfusion for plt ct 10. Shift report given to oncoming nurse Gumaro Lucas at the bedside.  
 
Paige Naik RN

## 2019-10-10 NOTE — DISCHARGE SUMMARY
[facility] Hematology & Oncology: Inpatient Hematology / Oncology Discharge Summary Note Patient ID: Chele Kirk [id number] 
77 y.o. 
1945 Admit Date: 9/21/2019 Discharge Date: 10/10/2019 Admission Diagnoses: Febrile neutropenia ([address] [D70.9, R50.81] Discharge Diagnoses: 
Principal Diagnosis: Febrile neutropenia ([address] Principal Problem: 
  Febrile neutropenia ([address] (9/21/2019) Active Problems: Thrombocytopenia ([address] (4/27/2019) AML (acute myeloblastic leukemia) ([address] (4/28/2019) Weakness generalized (4/28/2019) Pancytopenia due to antineoplastic chemotherapy ([address] (6/12/2019) Immunocompromised status associated with infection (6/12/2019) Hospital Course: Ms. Milana Douglas is a 68year old female who was admitted on 9/21/2019 for neutropenic fever. She was having intermittent low grade fevers over the last week prior to admission. She came to infusion the day prior to admission for a blood transfusion, and after going home she had a fever of 102.5 and was sent to the ER for evaluation. She is a known patient of Dr. Marc Cruz with AML. Initially treated with induction 7+3 and Midostaurin. Most recently on Decitabine plus Gilteritinib with cycle 2 beginning on 8/26/2019 and cycle 3 due 9/23/2019. CXR negative. UA clear. BC/UC NTD. Started on zosyn and vanc. She did have an area of cellulitis on L shoulder which improved with antibx. Repeat BMbx showed persistent AML. She was given 10 days of Dacogen while admitted, completed today. Also on Gilteritinib which she will continue upon discharge. She is feeling well and is ready for discharge home after completion of chemotherapy and platelet transfusion. She is scheduled for twice weekly lab/replacement visits and weekly provider f/u visits. She is scheduled for labs/replacements on 10/14 and 10/17 and f/u with provider on 10/17.   Advised to call with fever, chills, uncontrollable symptoms, or with any other concerns. AML 
-On Dacogen/Gilteritinib with most recent cycle 2 on 8/26/2019 with cycle 3 due 9/23/2019 9/23 Discuss BMbx flow results with patient-persistent AML-58% blasts. Still waiting on final pathology. 9/24 Bone marrow biopsy final path still pending. 9/30 Plan to restart Dacogen while inpatient for 10 days. 10/1 Dacogen to restart today. Plan to start Gilteritinib on D5 if she remains afebrile. 10/6 Day 6 of Dacogen. Tolerating well. Resumed Gilteritinib yesterday 10/8 Day 8 Dacogen. Con't Gilteritinib. Tolerating well. 
  
Neutropenic Fever 
-UA clear, BC/UC NTD 
-On Zosyn/Vanc empirically 9/23 Tmax 100.8. Feeling better today. Day 2 zosyn and vanc. BC/UC NTD 
9/24 Tmax 100.6. Day 3 vanc/zosyn. BC/UC still NGTD.  
9/25 Check aspergillus, fungitell, CMV. Add antifungal - vfend. CXR and BCx repeated yesterday and negative 9/26 BCx remain negative. Repeated this morning. Cdiff negative. D/c vancomycin. Continue zosyn, acyclovir, voriconazole. Consult ID for additional recommendations. Of note, gilteritinib can cause fevers in 13-35% of patients. Unclear timeline for febrile episodes? Will try to contact rep to discuss 9/27 Appreciate ID support. Changed to merrem. Given shortness of breath this morning, will proceed with CT CAP to eval for source. Could still be from drug vs disease as well 9/28 CT CAP negative for source of infection, PE. Did show trace pleural/pericardial effusion. Continue antibiotics. At this point, this may likely be drug or disease related fevers. Will hold gilteritinib and monitor fever curve 9/29 Gilteritinib remains on hold secondary to fevers 9/30 .8 overnight. Discussed with ID and okay to restart Dacogen, thorough workup and fevers improved overall. 10/1 Afebrile overnight and today thus far. Continuing on Merrem. 10/2 Remains afebrile. Merrem continues/ID following. 10/6 Remains afebrile. BCx-NGTD. On Merrem/Vanc. Erythema on L shoulder much improved. 10/7 per ID stopping Merrem. Return to Levaquin prophylaxix and follow response. Continue ACV, voriconazole. Left shoulder lesion resolved Continue vanc at least 5-7 days coverage pending response/counts 10/9 off vanc per ID. Continue ACV, voriconazole prophylaxis. Diarrhea 9/26 Cdiff negative. Add imodium/lomotil PRN 
10/7 diarrhea resolved 
  
Pancytopenia related to chemo/disease - Transfuse prn per [facility] SOPs 
10/4 Transfuse plt today 10/7 Transfuse per [facility] SOPs 
  
Dyspnea 9/27 BNP elevated at 458. Check echo. DC fluids. Lasix x 1 
9/28 Repeat BNP tomorrow AM. Lasix after blood today 9/29 Dyspneic with exertion. Repeat lasix after platelets. Echo still pending. Repeat CXR. Noted crackles to left base 9/30 TORREZ improving slowly. Repeat Lasix 20 mg today. 10/1 TORREZ improving. Repeat Lasix today 20 mg x 1.   
10/2 TORREZ greatly improved. No further lasix today. 10/7 dyspnea resolved 
  
Left shoulder ? Skin infection 10/2 Vanc started for redness/slight swelling/induration to left shoulder. Questionably related to fall but does not have the same appearance as bruise to right flank/side. Vanc started today. 10/4 L shoulder erythema improved. Con't Vanc. 10/6 ID recommends DC'ing Vanc, however area appears much improved on Vanc. Will continue Vanc for a couple more days d/t significant improvement of L shoulder cellulitis. 10/7 per ID Left shoulder lesion fading. Continue vanc at least 5-7 days coverage pending response/counts 10/9 resolved 
  
 
Consults: 
IP CONSULT TO ONCOLOGY 
IP CONSULT TO INFECTIOUS DISEASES Pertinent Diagnostic Studies:  
Labs:   
Recent Labs 10/10/19 
0250 10/09/19 
0224 10/08/19 
8396 WBC 0.6* 0.6* 0.5* HGB 7.9* 8.3* 6.8*  
PLT 10* 16* 19* ANEU 0.0* 0.0* 0.0* Recent Labs 10/10/19 
0250 10/09/19 
0224 10/08/19 
9281  143 144 K 4.4 4.7 4.4 * 110* 110* CO2 28 28 28 GLU 88 88 84 BUN 21 19 18 CREA 0.75 0.69 0.70 CA 8.8 8.4 8.2* SGOT 25 22 21 AP 98 98 96  
TP 6.4 6.5 6.3 ALB 2.9* 2.8* 2.7* Imaging: XR CHEST SNGL V [[id number]] Collected: 09/29/19 1217 Order Status: Completed Updated: 09/29/19 1220 Narrative:    
Portable chest x-ray CLINICAL INDICATION: Crackles on physical exam 
 
FINDINGS: Single AP view the chest compared to a similar exam dated 9/24/2019 
shows the lungs to be slightly underexpanded. No airspace consolidation noted. No jens pulmonary edema. The cardiac silhouette and mediastinum are stable. A 
left-sided chest port is in place. Impression:    
IMPRESSION: Slightly underexpanded lungs, otherwise no acute abnormality. CT CHEST ABD PELV W CONT [[id number]] Collected: 09/27/19 1632 Order Status: Completed Updated: 09/27/19 1641 Narrative:    
CT CHEST ABDOMEN AND PELVIS WITH CONTRAST HISTORY: fevers, SOB, new O2, 73 years Female  Neutropenic fever Possible infection Leukemia COMPARISON: Chest radiograph September 24, 2019 TECHNIQUE:  Oral contrast was administered.  100 cc of nonionic intravenous 
contrast was injected, and axial helical CT images were obtained from the 
thoracic inlet through the pelvis. Coronal reformatted images were obtained at 
the scanner console and made available for review.  Radiation dose reduction 
techniques were used for this study:  Our CT scanners use one or all of the 
following: Automated exposure control, adjustment of the mA and/or kVp according 
to patient's size, iterative reconstruction. FINDINGS: 
 
CHEST: 
Partially visualized thyroid unremarkable.  No evidence of significant 
mediastinal, hilar, or axillary lymphadenopathy.  Minimal calcific 
atherosclerosis of a normal caliber aortic arch and descending aorta.  Left 
chest wall nelli catheter appears to remain in anatomic position. Merry Hanna pericardial effusion.  Trace bilateral pleural effusions with associated mild 
dependent subsegmental atelectasis bilateral lung bases.  Visualized proximal 
airways unremarkable. ABDOMEN: 
Normal-appearing liver, gallbladder, spleen, pancreas, bilateral adrenal glands. 
 Small simple appearing right renal interpolar region cortical cyst measuring 2.4 cm in diameter.  Small nonobstructing right renal medullary level calculus 
measuring 2 mm in diameter.  Subcentimeter left renal cortical hypoenhancing 
lesions which are too small to characterize but probably cysts.  Mild calcific 
atherosclerosis of a normal caliber abdominal aorta.  No evidence of significant 
lymphadenopathy.  Normal-appearing small bowel.  No evidence of intraperitoneal 
free air or free fluid.  Image quality somewhat degraded by respiratory motion 
artifact. PELVIS: 
Normal-appearing urinary bladder.  Atrophic appearing uterus and bilateral 
adnexa with a coarse calcification of the uterine fundus which may represent a 
calcified fibroid.   Normal-appearing colon and partially visualized appendix. Trace pelvic free fluid, may be physiologic.  No evidence of significant 
inguinal or pelvic sidewall lymphadenopathy.  Visualized osseous structures 
unremarkable.    
Impression:    
IMPRESSION: 1.  Trace bilateral pleural effusions with a trace pericardial effusion. 2.  No other acute pathology identified.  Other incidental findings as above. XR CHEST PA LAT [[id number]] Collected: 09/24/19 1617 Order Status: Completed Updated: 09/24/19 1620 Narrative:    
CHEST X-RAY, 2 views 9/24/2019 History: Tachypnea and fever. Technique: PA and lateral views of the chest.  
 
Comparison: Chest x-ray 9/21/2019 Findings: A stable left-sided venous port is seen. The cardiac silhouette is mildly 
enlarged although stable.  The lungs are expanded without evidence for pneumothorax.  No evolving consolidation, or evidence of pleural effusion is 
seen. The bony thorax demonstrates no acute changes.  The upper abdomen is 
unremarkable in appearance. Impression:    
IMPRESSION:  
1.  Stable cardiomegaly without evolving acute changes evident by plain film 
imaging. XR CHEST PA LAT [[id number]] Collected: 09/21/19 2042 Order Status: Completed Updated: 09/21/19 2047 Narrative:    
EXAM: Chest x-ray. INDICATION: Febrile neutropenia. COMPARISON: May 18, 2019. TECHNIQUE: Frontal and lateral x-rays of the chest were obtained. FINDINGS: The lungs are clear except for minimal linear atelectasis or scarring 
in the lateral left costophrenic angle. The cardiac size, mediastinal contour 
and pulmonary vasculature are within normal limits. No pneumothorax or pleural 
effusion is seen. A left chest wall infusion port catheter remains in place. Impression:    
IMPRESSION: No acute process. Current Discharge Medication List  
  
START taking these medications Details  
levoFLOXacin (LEVAQUIN) 500 mg tablet Take 1 Tab by mouth every twenty-four (24) hours. Qty: 30 Tab, Refills: 0  
  
voriconazole (VFEND) 200 mg tablet Take 1 Tab by mouth every twelve (12) hours. Qty: 60 Tab, Refills: 0 CONTINUE these medications which have CHANGED Details  
acyclovir (ZOVIRAX) 400 mg tablet Take 1 Tab by mouth two (2) times a day for 30 days. Qty: 60 Tab, Refills: 0 CONTINUE these medications which have NOT CHANGED Details DULoxetine (CYMBALTA) 30 mg capsule Take 1 Cap by mouth daily. Qty: 30 Cap, Refills: 3  
  
zolpidem (AMBIEN) 5 mg tablet Take 1 Tab by mouth nightly as needed for Sleep. Max Daily Amount: 5 mg. Qty: 30 Tab, Refills: 0 Associated Diagnoses: Acute myeloid leukemia not having achieved remission ([address] Insomnia, unspecified type  
  
lidocaine-prilocaine (EMLA) topical cream Apply  to affected area as needed for Pain. Qty: 30 g, Refills: 0  
  
cholecalciferol (VITAMIN D3) 400 unit tab tablet Take 2 Tabs by mouth daily. Qty: 60 Tab, Refills: 0  
  
ondansetron hcl (ZOFRAN) 8 mg tablet Take 1 Tab by mouth every eight (8) hours as needed for Nausea. Qty: 90 Tab, Refills: 0  
  
vit C/E/zinc/lutein/zeaxanthin ([address] PO) Take 1 Tab by mouth daily. LORazepam (ATIVAN) 1 mg tablet Take  by mouth nightly. acetaminophen 500 mg tab 500 mg, diphenhydrAMINE 25 mg cap 25 mg Take  by mouth nightly. pravastatin (PRAVACHOL) 10 mg tablet Take  by mouth nightly. gilteritinib (XOSPATA) 40 mg tab Take 120 mg by mouth daily (with lunch). 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 
Qty: 90 Tab, Refills: 0 Associated Diagnoses: AML (acute myeloid leukemia) with failed remission ([facility] STOP taking these medications  
  
 fluconazole (DIFLUCAN) 200 mg tablet Comments:  
Reason for Stopping: OBJECTIVE: 
Patient Vitals for the past 8 hrs: 
 BP Temp Pulse Resp SpO2  
10/10/19 1310 124/43 98 °F (36.7 °C) 92 16 100 % 10/10/19 1116 133/59 98.7 °F (37.1 °C) 93 16 98 % 10/10/19 0755 102/67 98.4 °F (36.9 °C) 97 16 99 % Temp (24hrs), Av.3 °F (36.8 °C), Min:97.6 °F (36.4 °C), Max:98.7 °F (37.1 °C) 
 
10/10 07 - 10/10 1900 In: 500 [P.O.:500] Out: 400 [Urine:400] Physical Exam: 
Constitutional: Well developed, well nourished female in no acute distress, sitting comfortably in bed.  at bedside. HEENT: Normocephalic and atraumatic. Oropharynx is clear, mucous membranes are moist.  Neck supple. Patchy alopecia. Skin   Warm and dry. No pallor. Multiple bruises to BUE noted. Erythematous patch on L shoulder - much improved. Respiratory Lungs clear, normal air exchange without accessory muscle use. CVS Normal rate, regular rhythm and normal S1 and S2. No murmurs, gallops, or rubs. Abdomen Soft, nontender and nondistended, normoactive bowel sounds.   No palpable mass. No hepatosplenomegaly. Neuro Grossly nonfocal with no obvious sensory or motor deficits. MSK Normal range of motion in general.   No edema or tenderness. Psych Appropriate mood and affect.   
  
 
 
ASSESSMENT: 
 
Principal Problem: 
  Febrile neutropenia ([facility] (9/21/2019) Active Problems: Thrombocytopenia ([facility] (4/27/2019) AML (acute myeloblastic leukemia) ([facility] (4/28/2019) Weakness generalized (4/28/2019) Pancytopenia due to antineoplastic chemotherapy ([facility] (6/12/2019) Immunocompromised status associated with infection (6/12/2019) DISPOSITION: 
Follow-up Appointments Procedures  FOLLOW UP VISIT Appointment in: Other (Specify) - Twice weekly labs/replacements and weekly f/u with provider Please schedule infusion appt for labs/replacements and f/u with NP or Dr. Laith Montgomery on 10/17. She also has labs/replacements appt already . .. - Twice weekly labs/replacements and weekly f/u with provider Please schedule infusion appt for labs/replacements and f/u with NP or Dr. Laith Montgomery on 10/17. She also has labs/replacements appt already scheduled on 10/14. Standing Status:   Standing Number of Occurrences:   1 Order Specific Question:   Appointment in Answer: Other (Specify) Over 30 minutes was spent in discharge planning and coordination of care. Satish Lincoln NP [facility] Hematology & Oncology [id number] [address] Office : [phone] Fax : [phone]

## 2019-10-10 NOTE — DISCHARGE INSTRUCTIONS
Patient Education        Learning About Fever  What is a fever? A fever is a high body temperature. It's one way your body fights being sick. A fever shows that the body is responding to infection or other illnesses, both minor and severe. A fever is a symptom, not an illness by itself. A fever can be a sign that you are ill, but most fevers are not caused by a serious problem. You may have a fever with a minor illness, such as a cold. But sometimes a very serious infection may cause little or no fever. It is important to look at other symptoms, other conditions you have, and how you feel in general. In children, notice how they act and see what symptoms they complain of. What is a normal body temperature? A normal body temperature is about 98. 6ºF. Some people have a normal temperature that is a little higher or a little lower than this. Your temperature may be a little lower in the morning than it is later in the day. It may go up during hot weather or when you exercise, wear heavy clothes, or take a hot bath. Your temperature may also be different depending on how you take it. A temperature taken in the mouth (oral) or under the arm may be a little lower than your core temperature (rectal). What is a fever temperature? A core temperature of 100.4°F or above is considered a fever. What can cause a fever? A fever may be caused by:  · Infections. This is the most common cause of a fever. Examples of infections that can cause a fever include the flu, a kidney infection, or pneumonia. · Some medicines. · Severe trauma or injury, such as a heart attack, stroke, heatstroke, or burns. · Other medical conditions, such as arthritis and some cancers. How can you treat a fever at home? · Ask your doctor if you can take an over-the-counter pain medicine, such as acetaminophen (Tylenol), ibuprofen (Advil, Motrin), or naproxen (Aleve). Be safe with medicines. Read and follow all instructions on the label.   · To prevent dehydration, drink plenty of fluids. Choose water and other caffeine-free clear liquids until you feel better. If you have kidney, heart, or liver disease and have to limit fluids, talk with your doctor before you increase the amount of fluids you drink. Follow-up care is a key part of your treatment and safety. Be sure to make and go to all appointments, and call your doctor if you are having problems. It's also a good idea to know your test results and keep a list of the medicines you take. Where can you learn more? Go to http://cece-ahsan.info/. Enter T011 in the search box to learn more about \"Learning About Fever. \"  Current as of: June 26, 2019  Content Version: 12.2  © 2355-4266 [employer]. Care instructions adapted under license by [employer] (which disclaims liability or warranty for this information). If you have questions about a medical condition or this instruction, always ask your healthcare professional. [place] [address] any warranty or liability for your use of this information. DISCHARGE SUMMARY from Nurse    PATIENT INSTRUCTIONS:    After general anesthesia or intravenous sedation, for 24 hours or while taking prescription Narcotics:  · Limit your activities  · Do not drive and operate hazardous machinery  · Do not make important personal or business decisions  · Do  not drink alcoholic beverages  · If you have not urinated within 8 hours after discharge, please contact your surgeon on call.     Report the following to your surgeon:  · Excessive pain, swelling, redness or odor of or around the surgical area  · Temperature over 100.5  · Nausea and vomiting lasting longer than 4 hours or if unable to take medications  · Any signs of decreased circulation or nerve impairment to extremity: change in color, persistent  numbness, tingling, coldness or increase pain  · Any questions    What to do at Home:  Recommended activity: Activity as tolerated. If you experience any of the following symptom:  Fever greater than 100.4/ chills,  Uncontrolled pain or nausea/ vomiting,  Uncontrolled symptoms,  please follow up with your doctor. *  Please give a list of your current medications to your Primary Care Provider. *  Please update this list whenever your medications are discontinued, doses are      changed, or new medications (including over-the-counter products) are added. *  Please carry medication information at all times in case of emergency situations. These are general instructions for a healthy lifestyle:    No smoking/ No tobacco products/ Avoid exposure to second hand smoke  Surgeon General's Warning:  Quitting smoking now greatly reduces serious risk to your health. Obesity, smoking, and sedentary lifestyle greatly increases your risk for illness    A healthy diet, regular physical exercise & weight monitoring are important for maintaining a healthy lifestyle    You may be retaining fluid if you have a history of heart failure or if you experience any of the following symptoms:  Weight gain of 3 pounds or more overnight or 5 pounds in a week, increased swelling in our hands or feet or shortness of breath while lying flat in bed. Please call your doctor as soon as you notice any of these symptoms; do not wait until your next office visit. The discharge information has been reviewed with the patient. The patient verbalized understanding. Discharge medications reviewed with the patient and appropriate educational materials and side effects teaching were provided.   ___________________________________________________________________________________________________________________________________  ________________

## 2019-10-10 NOTE — PROGRESS NOTES
Patient is discharging to home today with no identified social or DC needs. Care Management Interventions Mode of Transport at Discharge: Other (see comment) Transition of Care Consult (CM Consult): Discharge Planning Discharge Durable Medical Equipment: No 
Physical Therapy Consult: No 
Occupational Therapy Consult: No 
Speech Therapy Consult: No 
Plan discussed with Pt/Family/Caregiver: No(Chart screened.) Discharge Location Discharge Placement: Home

## 2019-10-11 ENCOUNTER — PATIENT OUTREACH (OUTPATIENT)
Dept: CASE MANAGEMENT | Age: 74
End: 2019-10-11

## 2019-10-11 LAB
BLD PROD TYP BPU: NORMAL
BPU ID: NORMAL
STATUS OF UNIT,%ST: NORMAL
UNIT DIVISION, %UDIV: 0

## 2019-10-11 NOTE — PROGRESS NOTES
This note will not be viewable in [address] Transition of Care Discharge Follow-up Questionnaire Date/Time of Call: 
 10/11/19 
 1150am  
What was the patient hospitalized for? Febrile neutropenia Hx: AML Does the patient understand his/her diagnosis and/or treatment and what happened during the hospitalization? Yes, spoke with patients , he states understanding of diagnosis and treatment; and is agreeable to call. Mr. Clarice Orantes states patient is doing well Did the patient receive discharge instructions? Yes   
CM Assessed Risk for Readmission:  
 
 
Patient stated Risk for Readmission: Moderate/high r/t diagnosis and/or comorbidities None stated Review any discharge instructions (see discharge instructions/AVS in [facility]). Ask patient if they understand these. Do they have any questions? Reviewed, understanding is stated, no questions at this time Were home services ordered (nursing, PT, OT, ST, etc.)? No 
  
If so, has the first visit occurred? If not, why? (Assist with coordination of services if necessary.) 
 N/A Was any DME ordered? No  
  
If so, has it been received? If not, why?  (Assist patient in obtaining DME orders &/or equipment if necessary.) N/A Complete a review of all medications (new, continued and discontinued meds per the D/C instructions and medication tab in [facility]). Completed START taking: 
levoFLOXacin 500 mg tablet (LEVAQUIN) 
voriconazole 200 mg tablet (VFEND) STOP taking: 
fluconazole 200 mg tablet (DIFLUCAN) Were all new prescriptions filled? If not, why?  (Assist patient in obtaining medications if necessary  escalate for CCM &/or SW if ongoing issues are verbalized by pt or anticipated) Yes Does the patient understand the purpose and dosing instructions for all medications? (If patient has questions, provide explanation and education.) Yes Does the patient have any problems in performing ADLs? (If patient is unable to perform ADLs  what is the limiting factor(s)? Do they have a support system that can assist? If no support system is present, discuss possible assistance that they may be able to obtain. Escalate for CCM/SW if ongoing issues are verbalized by pt or anticipated) Independent with ADLs, Mr. Anne Marie Art assists if needed Does the patient have all follow-up appointments scheduled? 7 day f/up with PCP?  
(f/up with PCP may be w/in 14 days if patient has a f/up with their specialist w/in 7 days) 7-14 day f/up with specialist?  
(or per discharge instructions) If f/up has not been made  what actions has the care coordinator made to accomplish this? Has transportation been arranged? Yes Dr. Trish Briseno- ([facility]) 3/23/20, will call if need arises Lab/replacements 10/14/19; twice weekly Lam Steen, NP oncology 10/17/19 Yes, no transportation needs identified. Any other questions or concerns expressed by the patient? No other needs or concerns identified. Mr. Anne Marie Art states his gratitude for follow up. Contact information for Care Coordinator was given, instructed to call with new questions or concerns. Schedule next appointment with MARIAH Ramirez or refer to RN Case Manager/ per the workflow guidelines. When is care coordinators next follow-up call scheduled? If referred for CCM  what RN care manager was the referral assigned? Due to chronic disease process patient will likely have unavoidable hospitalizations and medical interventions for the remainder of treatment; as well as patient and family knowledge of disease and treatment no further TOBY outreach is needed. Patient is followed closely by oncology nurse navigator and has already spoken with her today. TOBY Call Completed By: Rocio Mariscal LPN Care Coordinator

## 2019-10-11 NOTE — PROGRESS NOTES
This note will not be viewable in [address] Initial TOBY outreach attempt to patient's home number was unsuccessful. Left message to return call. Will attempt second outreach within 24 hours.

## 2019-10-14 ENCOUNTER — HOSPITAL ENCOUNTER (OUTPATIENT)
Dept: INFUSION THERAPY | Age: 74
Discharge: HOME | End: 2019-10-14
Payer: MEDICARE

## 2019-10-14 VITALS
RESPIRATION RATE: 16 BRPM | OXYGEN SATURATION: 97 % | BODY MASS INDEX: 30.6 KG/M2 | DIASTOLIC BLOOD PRESSURE: 56 MMHG | HEART RATE: 97 BPM | WEIGHT: 189.6 LBS | TEMPERATURE: 99 F | SYSTOLIC BLOOD PRESSURE: 131 MMHG

## 2019-10-14 DIAGNOSIS — C92.00 ACUTE MYELOID LEUKEMIA NOT HAVING ACHIEVED REMISSION (HCC): Primary | ICD-10-CM

## 2019-10-14 LAB
ALBUMIN SERPL-MCNC: 3.3 G/DL (ref 3.2–4.6)
ALBUMIN/GLOB SERPL: 1 {RATIO} (ref 1.2–3.5)
ALP SERPL-CCNC: 102 U/L (ref 50–136)
ALT SERPL-CCNC: 35 U/L (ref 12–65)
ANION GAP SERPL CALC-SCNC: 6 MMOL/L (ref 7–16)
AST SERPL-CCNC: 26 U/L (ref 15–37)
BASOPHILS # BLD: 0 K/UL (ref 0–0.2)
BASOPHILS NFR BLD: 0 % (ref 0–2)
BILIRUB SERPL-MCNC: 0.4 MG/DL (ref 0.2–1.1)
BUN SERPL-MCNC: 27 MG/DL (ref 8–23)
CALCIUM SERPL-MCNC: 8.9 MG/DL (ref 8.3–10.4)
CHLORIDE SERPL-SCNC: 108 MMOL/L (ref 98–107)
CO2 SERPL-SCNC: 27 MMOL/L (ref 21–32)
CREAT SERPL-MCNC: 1.05 MG/DL (ref 0.6–1)
DIFFERENTIAL METHOD BLD: ABNORMAL
EOSINOPHIL # BLD: 0 K/UL (ref 0–0.8)
EOSINOPHIL NFR BLD: 0 % (ref 0.5–7.8)
ERYTHROCYTE [DISTWIDTH] IN BLOOD BY AUTOMATED COUNT: 12.9 % (ref 11.9–14.6)
GLOBULIN SER CALC-MCNC: 3.4 G/DL (ref 2.3–3.5)
GLUCOSE SERPL-MCNC: 123 MG/DL (ref 65–100)
HCT VFR BLD AUTO: 21.1 % (ref 35.8–46.3)
HGB BLD-MCNC: 7.2 G/DL (ref 11.7–15.4)
IMM GRANULOCYTES # BLD AUTO: 0 K/UL (ref 0–0.5)
IMM GRANULOCYTES NFR BLD AUTO: 0 % (ref 0–5)
LYMPHOCYTES # BLD: 0.4 K/UL (ref 0.5–4.6)
LYMPHOCYTES NFR BLD: 93 % (ref 13–44)
MAGNESIUM SERPL-MCNC: 1.9 MG/DL (ref 1.8–2.4)
MCH RBC QN AUTO: 29 PG (ref 26.1–32.9)
MCHC RBC AUTO-ENTMCNC: 34.1 G/DL (ref 31.4–35)
MCV RBC AUTO: 85.1 FL (ref 79.6–97.8)
MONOCYTES # BLD: 0 K/UL (ref 0.1–1.3)
MONOCYTES NFR BLD: 2 % (ref 4–12)
NEUTS SEG # BLD: 0 K/UL (ref 1.7–8.2)
NEUTS SEG NFR BLD: 5 % (ref 43–78)
NRBC # BLD: 0 K/UL (ref 0–0.2)
PLATELET # BLD AUTO: 30 K/UL (ref 150–450)
PLATELET COMMENTS,PCOM: ABNORMAL
PMV BLD AUTO: 11 FL (ref 9.4–12.3)
POTASSIUM SERPL-SCNC: 4 MMOL/L (ref 3.5–5.1)
PROT SERPL-MCNC: 6.7 G/DL (ref 6.3–8.2)
RBC # BLD AUTO: 2.48 M/UL (ref 4.05–5.25)
RBC MORPH BLD: ABNORMAL
SODIUM SERPL-SCNC: 141 MMOL/L (ref 136–145)
WBC # BLD AUTO: 0.4 K/UL (ref 4.3–11.1)
WBC MORPH BLD: ABNORMAL

## 2019-10-14 PROCEDURE — 80053 COMPREHEN METABOLIC PANEL: CPT

## 2019-10-14 PROCEDURE — 96360 HYDRATION IV INFUSION INIT: CPT

## 2019-10-14 PROCEDURE — 85025 COMPLETE CBC W/AUTO DIFF WBC: CPT

## 2019-10-14 PROCEDURE — 74011250636 HC RX REV CODE- 250/636: Performed by: INTERNAL MEDICINE

## 2019-10-14 PROCEDURE — 83735 ASSAY OF MAGNESIUM: CPT

## 2019-10-14 RX ORDER — SODIUM CHLORIDE 0.9 % (FLUSH) 0.9 %
10 SYRINGE (ML) INJECTION EVERY 8 HOURS
Status: DISCONTINUED | OUTPATIENT
Start: 2019-10-14 | End: 2019-10-18 | Stop reason: HOSPADM

## 2019-10-14 RX ADMIN — SODIUM CHLORIDE 1000 ML: 900 INJECTION, SOLUTION INTRAVENOUS at 08:48

## 2019-10-14 RX ADMIN — Medication 10 ML: at 09:49

## 2019-10-14 NOTE — PROGRESS NOTES
Arrived to the [facility]. Hydration and labs completed. Patient tolerated without problems. Any issues or concerns during appointment: Patient with c/o dizziness when standing. NS bolus ordered. Labs reviewed, does not require replacements based on parameters. Spoke with Hoda Partida NP regarding possible PRBC, no orders at this time, ok with NS bolus today and repeat labs on Thursday as scheduled Patient aware of next infusion appointment on 10/17/19 (date) at 0900 (time). Discharged via [facility] with spouse.

## 2019-10-17 ENCOUNTER — HOSPITAL ENCOUNTER (OUTPATIENT)
Dept: LAB | Age: 74
Discharge: HOME | End: 2019-10-17
Payer: MEDICARE

## 2019-10-17 ENCOUNTER — HOSPITAL ENCOUNTER (OUTPATIENT)
Dept: INFUSION THERAPY | Age: 74
Discharge: HOME | End: 2019-10-17
Payer: MEDICARE

## 2019-10-17 ENCOUNTER — PATIENT OUTREACH (OUTPATIENT)
Dept: CASE MANAGEMENT | Age: 74
End: 2019-10-17

## 2019-10-17 VITALS
OXYGEN SATURATION: 100 % | DIASTOLIC BLOOD PRESSURE: 80 MMHG | SYSTOLIC BLOOD PRESSURE: 125 MMHG | HEART RATE: 70 BPM | TEMPERATURE: 98.3 F | RESPIRATION RATE: 18 BRPM

## 2019-10-17 DIAGNOSIS — C92.00 AML (ACUTE MYELOID LEUKEMIA) WITH FAILED REMISSION (HCC): ICD-10-CM

## 2019-10-17 DIAGNOSIS — C92.00 ACUTE MYELOID LEUKEMIA NOT HAVING ACHIEVED REMISSION (HCC): ICD-10-CM

## 2019-10-17 DIAGNOSIS — C92.00 AML (ACUTE MYELOID LEUKEMIA) WITH FAILED REMISSION (HCC): Primary | ICD-10-CM

## 2019-10-17 LAB
ALBUMIN SERPL-MCNC: 3.1 G/DL (ref 3.2–4.6)
ALBUMIN/GLOB SERPL: 0.9 {RATIO} (ref 1.2–3.5)
ALP SERPL-CCNC: 104 U/L (ref 50–136)
ALT SERPL-CCNC: 33 U/L (ref 12–65)
ANION GAP SERPL CALC-SCNC: 6 MMOL/L (ref 7–16)
AST SERPL-CCNC: 23 U/L (ref 15–37)
BASOPHILS # BLD: 0 K/UL (ref 0–0.2)
BASOPHILS NFR BLD: 0 % (ref 0–2)
BILIRUB SERPL-MCNC: 0.2 MG/DL (ref 0.2–1.1)
BUN SERPL-MCNC: 22 MG/DL (ref 8–23)
CALCIUM SERPL-MCNC: 9.1 MG/DL (ref 8.3–10.4)
CHLORIDE SERPL-SCNC: 105 MMOL/L (ref 98–107)
CO2 SERPL-SCNC: 27 MMOL/L (ref 21–32)
CREAT SERPL-MCNC: 1.16 MG/DL (ref 0.6–1)
DIFFERENTIAL METHOD BLD: ABNORMAL
EOSINOPHIL # BLD: 0 K/UL (ref 0–0.8)
EOSINOPHIL NFR BLD: 3 % (ref 0.5–7.8)
ERYTHROCYTE [DISTWIDTH] IN BLOOD BY AUTOMATED COUNT: 12.8 % (ref 11.9–14.6)
GLOBULIN SER CALC-MCNC: 3.6 G/DL (ref 2.3–3.5)
GLUCOSE SERPL-MCNC: 124 MG/DL (ref 65–100)
HCT VFR BLD AUTO: 18 % (ref 35.8–46.3)
HGB BLD-MCNC: 6.2 G/DL (ref 11.7–15.4)
IMM GRANULOCYTES # BLD AUTO: 0 K/UL (ref 0–0.5)
IMM GRANULOCYTES NFR BLD AUTO: 0 % (ref 0–5)
LYMPHOCYTES # BLD: 0.4 K/UL (ref 0.5–4.6)
LYMPHOCYTES NFR BLD: 89 % (ref 13–44)
MAGNESIUM SERPL-MCNC: 2 MG/DL (ref 1.8–2.4)
MCH RBC QN AUTO: 29.2 PG (ref 26.1–32.9)
MCHC RBC AUTO-ENTMCNC: 34.4 G/DL (ref 31.4–35)
MCV RBC AUTO: 84.9 FL (ref 79.6–97.8)
MONOCYTES # BLD: 0 K/UL (ref 0.1–1.3)
MONOCYTES NFR BLD: 5 % (ref 4–12)
NEUTS SEG # BLD: 0 K/UL (ref 1.7–8.2)
NEUTS SEG NFR BLD: 3 % (ref 43–78)
NRBC # BLD: 0 K/UL (ref 0–0.2)
PLATELET # BLD AUTO: 11 K/UL (ref 150–450)
PLATELET COMMENTS,PCOM: ABNORMAL
PMV BLD AUTO: 12.2 FL (ref 9.4–12.3)
POTASSIUM SERPL-SCNC: 4.2 MMOL/L (ref 3.5–5.1)
PROT SERPL-MCNC: 6.7 G/DL (ref 6.3–8.2)
RBC # BLD AUTO: 2.12 M/UL (ref 4.05–5.25)
RBC MORPH BLD: ABNORMAL
RBC MORPH BLD: ABNORMAL
SODIUM SERPL-SCNC: 138 MMOL/L (ref 136–145)
WBC # BLD AUTO: 0.4 K/UL (ref 4.3–11.1)
WBC MORPH BLD: ABNORMAL

## 2019-10-17 PROCEDURE — 80053 COMPREHEN METABOLIC PANEL: CPT

## 2019-10-17 PROCEDURE — 74011250637 HC RX REV CODE- 250/637: Performed by: INTERNAL MEDICINE

## 2019-10-17 PROCEDURE — P9040 RBC LEUKOREDUCED IRRADIATED: HCPCS

## 2019-10-17 PROCEDURE — 85025 COMPLETE CBC W/AUTO DIFF WBC: CPT

## 2019-10-17 PROCEDURE — 36430 TRANSFUSION BLD/BLD COMPNT: CPT

## 2019-10-17 PROCEDURE — 86644 CMV ANTIBODY: CPT

## 2019-10-17 PROCEDURE — 83735 ASSAY OF MAGNESIUM: CPT

## 2019-10-17 PROCEDURE — 74011250636 HC RX REV CODE- 250/636

## 2019-10-17 PROCEDURE — 86923 COMPATIBILITY TEST ELECTRIC: CPT

## 2019-10-17 PROCEDURE — 86900 BLOOD TYPING SEROLOGIC ABO: CPT

## 2019-10-17 PROCEDURE — 77030018667 ADMN ST IV BLD FENW -A

## 2019-10-17 PROCEDURE — P9037 PLATE PHERES LEUKOREDU IRRAD: HCPCS

## 2019-10-17 RX ORDER — DIPHENHYDRAMINE HCL 25 MG
25 CAPSULE ORAL
Status: DISPENSED | OUTPATIENT
Start: 2019-10-17 | End: 2019-10-17

## 2019-10-17 RX ORDER — SODIUM CHLORIDE 9 MG/ML
250 INJECTION, SOLUTION INTRAVENOUS AS NEEDED
Status: DISCONTINUED | OUTPATIENT
Start: 2019-10-17 | End: 2019-10-21 | Stop reason: HOSPADM

## 2019-10-17 RX ORDER — ACETAMINOPHEN 325 MG/1
650 TABLET ORAL ONCE
Status: COMPLETED | OUTPATIENT
Start: 2019-10-17 | End: 2019-10-17

## 2019-10-17 RX ADMIN — SODIUM CHLORIDE 250 ML: 900 INJECTION, SOLUTION INTRAVENOUS at 14:15

## 2019-10-17 RX ADMIN — ACETAMINOPHEN 650 MG: 325 TABLET, FILM COATED ORAL at 14:00

## 2019-10-17 RX ADMIN — DIPHENHYDRAMINE HYDROCHLORIDE 25 MG: 25 CAPSULE ORAL at 14:00

## 2019-10-17 NOTE — PROGRESS NOTES
10/17/19:  Patient in for follow-up with Dr. Sana Paz after recent hospital discharge. She reports fatigue and weakness and inability to walk down her perry this am.  Patient almost fell at home because of weakness. Dr. Sana Paz reviewed labs and assessed the patient. Patient to receive 2 units of PRBC and 1 unit of platelets today and tomorrow. Patient continuing gilteritinib. Patient to also continue acyclovir, levaquin and vfend. She will continue labs and replacements twice a week with weekly provider visits. Dr. Sana Paz would like a bone marrow biopsy around day 30 of this chemo cycle if counts recovering. If no recovery by day 35, he would like bone marrow anyway. Possible admission for FLAG-VENITA with next cycle of treatment.

## 2019-10-18 LAB
ABO + RH BLD: NORMAL
BLD PROD TYP BPU: NORMAL
BLOOD GROUP ANTIBODIES SERPL: NORMAL
BPU ID: NORMAL
CROSSMATCH RESULT,%XM: NORMAL
CROSSMATCH RESULT,%XM: NORMAL
SPECIMEN EXP DATE BLD: NORMAL
STATUS OF UNIT,%ST: NORMAL
UNIT DIVISION, %UDIV: 0

## 2019-10-21 ENCOUNTER — HOSPITAL ENCOUNTER (OUTPATIENT)
Dept: INFUSION THERAPY | Age: 74
Discharge: HOME | End: 2019-10-21
Payer: MEDICARE

## 2019-10-21 VITALS
HEART RATE: 80 BPM | WEIGHT: 192.4 LBS | OXYGEN SATURATION: 96 % | DIASTOLIC BLOOD PRESSURE: 78 MMHG | RESPIRATION RATE: 18 BRPM | BODY MASS INDEX: 31.05 KG/M2 | SYSTOLIC BLOOD PRESSURE: 142 MMHG | TEMPERATURE: 100 F

## 2019-10-21 DIAGNOSIS — C92.00 ACUTE MYELOID LEUKEMIA NOT HAVING ACHIEVED REMISSION (HCC): Primary | ICD-10-CM

## 2019-10-21 LAB
ALBUMIN SERPL-MCNC: 2.8 G/DL (ref 3.2–4.6)
ALBUMIN/GLOB SERPL: 0.7 {RATIO} (ref 1.2–3.5)
ALP SERPL-CCNC: 97 U/L (ref 50–136)
ALT SERPL-CCNC: 30 U/L (ref 12–65)
ANION GAP SERPL CALC-SCNC: 8 MMOL/L (ref 7–16)
AST SERPL-CCNC: 23 U/L (ref 15–37)
BILIRUB SERPL-MCNC: 0.3 MG/DL (ref 0.2–1.1)
BUN SERPL-MCNC: 20 MG/DL (ref 8–23)
CALCIUM SERPL-MCNC: 8.5 MG/DL (ref 8.3–10.4)
CHLORIDE SERPL-SCNC: 109 MMOL/L (ref 98–107)
CO2 SERPL-SCNC: 25 MMOL/L (ref 21–32)
CREAT SERPL-MCNC: 0.9 MG/DL (ref 0.6–1)
DIFFERENTIAL METHOD BLD: ABNORMAL
ERYTHROCYTE [DISTWIDTH] IN BLOOD BY AUTOMATED COUNT: 12.9 % (ref 11.9–14.6)
GLOBULIN SER CALC-MCNC: 3.8 G/DL (ref 2.3–3.5)
GLUCOSE SERPL-MCNC: 119 MG/DL (ref 65–100)
HCT VFR BLD AUTO: 20.1 % (ref 35.8–46.3)
HGB BLD-MCNC: 6.7 G/DL (ref 11.7–15.4)
MAGNESIUM SERPL-MCNC: 2.1 MG/DL (ref 1.8–2.4)
MCH RBC QN AUTO: 28.6 PG (ref 26.1–32.9)
MCHC RBC AUTO-ENTMCNC: 33.3 G/DL (ref 31.4–35)
MCV RBC AUTO: 85.9 FL (ref 79.6–97.8)
NRBC # BLD: 0 K/UL (ref 0–0.2)
PLATELET # BLD AUTO: 19 K/UL (ref 150–450)
PLATELET COMMENTS,PCOM: ABNORMAL
PMV BLD AUTO: 10 FL (ref 9.4–12.3)
POTASSIUM SERPL-SCNC: 3.9 MMOL/L (ref 3.5–5.1)
PROT SERPL-MCNC: 6.6 G/DL (ref 6.3–8.2)
RBC # BLD AUTO: 2.34 M/UL (ref 4.05–5.25)
RBC MORPH BLD: ABNORMAL
SODIUM SERPL-SCNC: 142 MMOL/L (ref 136–145)
WBC # BLD AUTO: 0.3 K/UL (ref 4.3–11.1)
WBC MORPH BLD: ABNORMAL

## 2019-10-21 PROCEDURE — 74011250636 HC RX REV CODE- 250/636: Performed by: INTERNAL MEDICINE

## 2019-10-21 PROCEDURE — 80053 COMPREHEN METABOLIC PANEL: CPT

## 2019-10-21 PROCEDURE — 36430 TRANSFUSION BLD/BLD COMPNT: CPT

## 2019-10-21 PROCEDURE — 96360 HYDRATION IV INFUSION INIT: CPT

## 2019-10-21 PROCEDURE — 77030018667 ADMN ST IV BLD FENW -A

## 2019-10-21 PROCEDURE — 86644 CMV ANTIBODY: CPT

## 2019-10-21 PROCEDURE — 85025 COMPLETE CBC W/AUTO DIFF WBC: CPT

## 2019-10-21 PROCEDURE — 86923 COMPATIBILITY TEST ELECTRIC: CPT

## 2019-10-21 PROCEDURE — 86900 BLOOD TYPING SEROLOGIC ABO: CPT

## 2019-10-21 PROCEDURE — 96361 HYDRATE IV INFUSION ADD-ON: CPT

## 2019-10-21 PROCEDURE — 74011250637 HC RX REV CODE- 250/637: Performed by: INTERNAL MEDICINE

## 2019-10-21 PROCEDURE — P9040 RBC LEUKOREDUCED IRRADIATED: HCPCS

## 2019-10-21 PROCEDURE — 83735 ASSAY OF MAGNESIUM: CPT

## 2019-10-21 RX ORDER — ACETAMINOPHEN 325 MG/1
650 TABLET ORAL ONCE
Status: COMPLETED | OUTPATIENT
Start: 2019-10-21 | End: 2019-10-21

## 2019-10-21 RX ORDER — SODIUM CHLORIDE 9 MG/ML
1000 INJECTION, SOLUTION INTRAVENOUS ONCE
Status: COMPLETED | OUTPATIENT
Start: 2019-10-21 | End: 2019-10-21

## 2019-10-21 RX ORDER — DIPHENHYDRAMINE HCL 25 MG
25 CAPSULE ORAL
Status: DISCONTINUED | OUTPATIENT
Start: 2019-10-21 | End: 2019-10-25 | Stop reason: HOSPADM

## 2019-10-21 RX ORDER — SODIUM CHLORIDE 0.9 % (FLUSH) 0.9 %
10 SYRINGE (ML) INJECTION EVERY 8 HOURS
Status: DISCONTINUED | OUTPATIENT
Start: 2019-10-21 | End: 2019-10-25 | Stop reason: HOSPADM

## 2019-10-21 RX ORDER — SODIUM CHLORIDE 9 MG/ML
250 INJECTION, SOLUTION INTRAVENOUS AS NEEDED
Status: DISCONTINUED | OUTPATIENT
Start: 2019-10-21 | End: 2019-10-25 | Stop reason: HOSPADM

## 2019-10-21 RX ADMIN — SODIUM CHLORIDE 250 ML: 900 INJECTION, SOLUTION INTRAVENOUS at 13:15

## 2019-10-21 RX ADMIN — Medication 10 ML: at 15:55

## 2019-10-21 RX ADMIN — DIPHENHYDRAMINE HYDROCHLORIDE 25 MG: 25 CAPSULE ORAL at 13:20

## 2019-10-21 RX ADMIN — ACETAMINOPHEN 650 MG: 325 TABLET, FILM COATED ORAL at 13:20

## 2019-10-21 RX ADMIN — SODIUM CHLORIDE 1000 ML: 900 INJECTION, SOLUTION INTRAVENOUS at 09:00

## 2019-10-24 ENCOUNTER — HOSPITAL ENCOUNTER (OUTPATIENT)
Dept: INFUSION THERAPY | Age: 74
Discharge: HOME | End: 2019-10-24
Payer: MEDICARE

## 2019-10-24 ENCOUNTER — HOSPITAL ENCOUNTER (OUTPATIENT)
Dept: LAB | Age: 74
Discharge: HOME | End: 2019-10-24
Payer: MEDICARE

## 2019-10-24 ENCOUNTER — PATIENT OUTREACH (OUTPATIENT)
Dept: CASE MANAGEMENT | Age: 74
End: 2019-10-24

## 2019-10-24 VITALS
WEIGHT: 192 LBS | RESPIRATION RATE: 18 BRPM | TEMPERATURE: 98.5 F | SYSTOLIC BLOOD PRESSURE: 120 MMHG | OXYGEN SATURATION: 98 % | DIASTOLIC BLOOD PRESSURE: 62 MMHG | BODY MASS INDEX: 30.99 KG/M2 | HEART RATE: 80 BPM

## 2019-10-24 DIAGNOSIS — C92.00 AML (ACUTE MYELOID LEUKEMIA) WITH FAILED REMISSION (HCC): Primary | ICD-10-CM

## 2019-10-24 DIAGNOSIS — C92.00 ACUTE MYELOID LEUKEMIA NOT HAVING ACHIEVED REMISSION (HCC): Primary | ICD-10-CM

## 2019-10-24 DIAGNOSIS — C92.00 ACUTE MYELOID LEUKEMIA NOT HAVING ACHIEVED REMISSION (HCC): ICD-10-CM

## 2019-10-24 DIAGNOSIS — C92.00 AML (ACUTE MYELOID LEUKEMIA) WITH FAILED REMISSION (HCC): ICD-10-CM

## 2019-10-24 LAB
ABO + RH BLD: NORMAL
ABO + RH BLD: NORMAL
ALBUMIN SERPL-MCNC: 2.7 G/DL (ref 3.2–4.6)
ALBUMIN/GLOB SERPL: 0.8 {RATIO} (ref 1.2–3.5)
ALP SERPL-CCNC: 96 U/L (ref 50–136)
ALT SERPL-CCNC: 30 U/L (ref 12–65)
ANION GAP SERPL CALC-SCNC: 6 MMOL/L (ref 7–16)
AST SERPL-CCNC: 21 U/L (ref 15–37)
BILIRUB SERPL-MCNC: 0.3 MG/DL (ref 0.2–1.1)
BLD PROD TYP BPU: NORMAL
BLOOD GROUP ANTIBODIES SERPL: NORMAL
BLOOD GROUP ANTIBODIES SERPL: NORMAL
BPU ID: NORMAL
BUN SERPL-MCNC: 17 MG/DL (ref 8–23)
CALCIUM SERPL-MCNC: 8.1 MG/DL (ref 8.3–10.4)
CHLORIDE SERPL-SCNC: 110 MMOL/L (ref 98–107)
CO2 SERPL-SCNC: 26 MMOL/L (ref 21–32)
CREAT SERPL-MCNC: 1.05 MG/DL (ref 0.6–1)
CROSSMATCH RESULT,%XM: NORMAL
DIFFERENTIAL METHOD BLD: ABNORMAL
ERYTHROCYTE [DISTWIDTH] IN BLOOD BY AUTOMATED COUNT: 13.2 % (ref 11.9–14.6)
GLOBULIN SER CALC-MCNC: 3.6 G/DL (ref 2.3–3.5)
GLUCOSE SERPL-MCNC: 107 MG/DL (ref 65–100)
HCT VFR BLD AUTO: 21.7 % (ref 35.8–46.3)
HGB BLD-MCNC: 7.5 G/DL (ref 11.7–15.4)
MAGNESIUM SERPL-MCNC: 1.8 MG/DL (ref 1.8–2.4)
MCH RBC QN AUTO: 29.3 PG (ref 26.1–32.9)
MCHC RBC AUTO-ENTMCNC: 34.6 G/DL (ref 31.4–35)
MCV RBC AUTO: 84.8 FL (ref 79.6–97.8)
NRBC # BLD: 0 K/UL (ref 0–0.2)
PLATELET # BLD AUTO: 4 K/UL (ref 150–450)
PLATELET COMMENTS,PCOM: ABNORMAL
PMV BLD AUTO: 9 FL (ref 9.4–12.3)
POTASSIUM SERPL-SCNC: 3.7 MMOL/L (ref 3.5–5.1)
PROT SERPL-MCNC: 6.3 G/DL (ref 6.3–8.2)
RBC # BLD AUTO: 2.56 M/UL (ref 4.05–5.25)
RBC MORPH BLD: ABNORMAL
SODIUM SERPL-SCNC: 142 MMOL/L (ref 136–145)
SPECIMEN EXP DATE BLD: NORMAL
SPECIMEN EXP DATE BLD: NORMAL
STATUS OF UNIT,%ST: NORMAL
UNIT DIVISION, %UDIV: 0
WBC # BLD AUTO: 0.4 K/UL (ref 4.3–11.1)
WBC MORPH BLD: ABNORMAL

## 2019-10-24 PROCEDURE — 86900 BLOOD TYPING SEROLOGIC ABO: CPT

## 2019-10-24 PROCEDURE — 74011250637 HC RX REV CODE- 250/637: Performed by: NURSE PRACTITIONER

## 2019-10-24 PROCEDURE — 96365 THER/PROPH/DIAG IV INF INIT: CPT

## 2019-10-24 PROCEDURE — P9037 PLATE PHERES LEUKOREDU IRRAD: HCPCS

## 2019-10-24 PROCEDURE — 77030018667 ADMN ST IV BLD FENW -A

## 2019-10-24 PROCEDURE — 85025 COMPLETE CBC W/AUTO DIFF WBC: CPT

## 2019-10-24 PROCEDURE — 74011250636 HC RX REV CODE- 250/636: Performed by: NURSE PRACTITIONER

## 2019-10-24 PROCEDURE — 86644 CMV ANTIBODY: CPT

## 2019-10-24 PROCEDURE — 83735 ASSAY OF MAGNESIUM: CPT

## 2019-10-24 PROCEDURE — 96366 THER/PROPH/DIAG IV INF ADDON: CPT

## 2019-10-24 PROCEDURE — 80053 COMPREHEN METABOLIC PANEL: CPT

## 2019-10-24 PROCEDURE — 96367 TX/PROPH/DG ADDL SEQ IV INF: CPT

## 2019-10-24 PROCEDURE — 87040 BLOOD CULTURE FOR BACTERIA: CPT

## 2019-10-24 PROCEDURE — 36430 TRANSFUSION BLD/BLD COMPNT: CPT

## 2019-10-24 PROCEDURE — 74011000258 HC RX REV CODE- 258: Performed by: NURSE PRACTITIONER

## 2019-10-24 RX ORDER — DIPHENHYDRAMINE HCL 25 MG
25 CAPSULE ORAL
Status: DISCONTINUED | OUTPATIENT
Start: 2019-10-24 | End: 2019-10-28 | Stop reason: HOSPADM

## 2019-10-24 RX ORDER — VANCOMYCIN/0.9 % SOD CHLORIDE 1.5G/250ML
1500 PLASTIC BAG, INJECTION (ML) INTRAVENOUS ONCE
Status: COMPLETED | OUTPATIENT
Start: 2019-10-24 | End: 2019-10-24

## 2019-10-24 RX ORDER — SODIUM CHLORIDE 9 MG/ML
250 INJECTION, SOLUTION INTRAVENOUS AS NEEDED
Status: DISCONTINUED | OUTPATIENT
Start: 2019-10-24 | End: 2019-10-28 | Stop reason: HOSPADM

## 2019-10-24 RX ORDER — ACETAMINOPHEN 325 MG/1
650 TABLET ORAL ONCE
Status: COMPLETED | OUTPATIENT
Start: 2019-10-24 | End: 2019-10-24

## 2019-10-24 RX ADMIN — VANCOMYCIN HYDROCHLORIDE 1500 MG: 10 INJECTION, POWDER, LYOPHILIZED, FOR SOLUTION INTRAVENOUS at 10:43

## 2019-10-24 RX ADMIN — ACETAMINOPHEN 650 MG: 325 TABLET, FILM COATED ORAL at 12:11

## 2019-10-24 RX ADMIN — DIPHENHYDRAMINE HYDROCHLORIDE 25 MG: 25 CAPSULE ORAL at 12:11

## 2019-10-24 RX ADMIN — PIPERACILLIN AND TAZOBACTAM: 4; .5 INJECTION, POWDER, LYOPHILIZED, FOR SOLUTION INTRAVENOUS at 12:20

## 2019-10-24 RX ADMIN — SODIUM CHLORIDE 250 ML: 900 INJECTION, SOLUTION INTRAVENOUS at 12:53

## 2019-10-25 LAB
BLD PROD TYP BPU: NORMAL
BPU ID: NORMAL
STATUS OF UNIT,%ST: NORMAL
UNIT DIVISION, %UDIV: 0

## 2019-10-28 ENCOUNTER — HOSPITAL ENCOUNTER (OUTPATIENT)
Dept: INFUSION THERAPY | Age: 74
Discharge: HOME | End: 2019-10-28
Payer: MEDICARE

## 2019-10-28 VITALS
SYSTOLIC BLOOD PRESSURE: 117 MMHG | WEIGHT: 191.6 LBS | OXYGEN SATURATION: 97 % | DIASTOLIC BLOOD PRESSURE: 50 MMHG | TEMPERATURE: 98.6 F | RESPIRATION RATE: 18 BRPM | BODY MASS INDEX: 30.93 KG/M2 | HEART RATE: 80 BPM

## 2019-10-28 DIAGNOSIS — C92.00 ACUTE MYELOID LEUKEMIA NOT HAVING ACHIEVED REMISSION (HCC): Primary | ICD-10-CM

## 2019-10-28 LAB
ALBUMIN SERPL-MCNC: 2.7 G/DL (ref 3.2–4.6)
ALBUMIN/GLOB SERPL: 0.7 {RATIO} (ref 1.2–3.5)
ALP SERPL-CCNC: 94 U/L (ref 50–136)
ALT SERPL-CCNC: 31 U/L (ref 12–65)
ANION GAP SERPL CALC-SCNC: 8 MMOL/L (ref 7–16)
AST SERPL-CCNC: 21 U/L (ref 15–37)
BILIRUB SERPL-MCNC: 0.2 MG/DL (ref 0.2–1.1)
BUN SERPL-MCNC: 17 MG/DL (ref 8–23)
CALCIUM SERPL-MCNC: 8.8 MG/DL (ref 8.3–10.4)
CHLORIDE SERPL-SCNC: 109 MMOL/L (ref 98–107)
CO2 SERPL-SCNC: 26 MMOL/L (ref 21–32)
CREAT SERPL-MCNC: 0.93 MG/DL (ref 0.6–1)
DIFFERENTIAL METHOD BLD: ABNORMAL
ERYTHROCYTE [DISTWIDTH] IN BLOOD BY AUTOMATED COUNT: 12.9 % (ref 11.9–14.6)
GLOBULIN SER CALC-MCNC: 3.7 G/DL (ref 2.3–3.5)
GLUCOSE SERPL-MCNC: 122 MG/DL (ref 65–100)
HCT VFR BLD AUTO: 18.3 % (ref 35.8–46.3)
HGB BLD-MCNC: 6.2 G/DL (ref 11.7–15.4)
MAGNESIUM SERPL-MCNC: 1.8 MG/DL (ref 1.8–2.4)
MCH RBC QN AUTO: 28.7 PG (ref 26.1–32.9)
MCHC RBC AUTO-ENTMCNC: 33.9 G/DL (ref 31.4–35)
MCV RBC AUTO: 84.7 FL (ref 79.6–97.8)
NRBC # BLD: 0 K/UL (ref 0–0.2)
PLATELET # BLD AUTO: 24 K/UL (ref 150–450)
PLATELET COMMENTS,PCOM: ABNORMAL
PMV BLD AUTO: 9.5 FL (ref 9.4–12.3)
POTASSIUM SERPL-SCNC: 3.8 MMOL/L (ref 3.5–5.1)
PROT SERPL-MCNC: 6.4 G/DL (ref 6.3–8.2)
RBC # BLD AUTO: 2.16 M/UL (ref 4.05–5.25)
RBC MORPH BLD: ABNORMAL
SODIUM SERPL-SCNC: 143 MMOL/L (ref 136–145)
WBC # BLD AUTO: 0.4 K/UL (ref 4.3–11.1)
WBC MORPH BLD: ABNORMAL

## 2019-10-28 PROCEDURE — 86923 COMPATIBILITY TEST ELECTRIC: CPT

## 2019-10-28 PROCEDURE — 86644 CMV ANTIBODY: CPT

## 2019-10-28 PROCEDURE — 80053 COMPREHEN METABOLIC PANEL: CPT

## 2019-10-28 PROCEDURE — 83735 ASSAY OF MAGNESIUM: CPT

## 2019-10-28 PROCEDURE — 86900 BLOOD TYPING SEROLOGIC ABO: CPT

## 2019-10-28 PROCEDURE — 74011250637 HC RX REV CODE- 250/637: Performed by: INTERNAL MEDICINE

## 2019-10-28 PROCEDURE — P9040 RBC LEUKOREDUCED IRRADIATED: HCPCS

## 2019-10-28 PROCEDURE — 85025 COMPLETE CBC W/AUTO DIFF WBC: CPT

## 2019-10-28 PROCEDURE — 36430 TRANSFUSION BLD/BLD COMPNT: CPT

## 2019-10-28 PROCEDURE — 77030018667 ADMN ST IV BLD FENW -A

## 2019-10-28 PROCEDURE — 74011250636 HC RX REV CODE- 250/636: Performed by: INTERNAL MEDICINE

## 2019-10-28 RX ORDER — DIPHENHYDRAMINE HYDROCHLORIDE 50 MG/ML
50 INJECTION, SOLUTION INTRAMUSCULAR; INTRAVENOUS AS NEEDED
Status: DISCONTINUED | OUTPATIENT
Start: 2019-10-28 | End: 2019-10-29 | Stop reason: HOSPADM

## 2019-10-28 RX ORDER — ALBUTEROL SULFATE 0.83 MG/ML
2.5 SOLUTION RESPIRATORY (INHALATION) AS NEEDED
Status: DISCONTINUED | OUTPATIENT
Start: 2019-10-28 | End: 2019-10-29 | Stop reason: HOSPADM

## 2019-10-28 RX ORDER — ACETAMINOPHEN 325 MG/1
650 TABLET ORAL AS NEEDED
Status: DISCONTINUED | OUTPATIENT
Start: 2019-10-28 | End: 2019-10-29 | Stop reason: HOSPADM

## 2019-10-28 RX ORDER — EPINEPHRINE 1 MG/ML
0.3 INJECTION, SOLUTION, CONCENTRATE INTRAVENOUS AS NEEDED
Status: DISCONTINUED | OUTPATIENT
Start: 2019-10-28 | End: 2019-10-29 | Stop reason: HOSPADM

## 2019-10-28 RX ORDER — SODIUM CHLORIDE 9 MG/ML
250 INJECTION, SOLUTION INTRAVENOUS AS NEEDED
Status: DISCONTINUED | OUTPATIENT
Start: 2019-10-28 | End: 2019-10-31 | Stop reason: HOSPADM

## 2019-10-28 RX ORDER — SODIUM CHLORIDE 0.9 % (FLUSH) 0.9 %
10 SYRINGE (ML) INJECTION EVERY 8 HOURS
Status: DISCONTINUED | OUTPATIENT
Start: 2019-10-28 | End: 2019-10-31 | Stop reason: HOSPADM

## 2019-10-28 RX ORDER — DIPHENHYDRAMINE HCL 25 MG
25 CAPSULE ORAL
Status: COMPLETED | OUTPATIENT
Start: 2019-10-28 | End: 2019-10-28

## 2019-10-28 RX ORDER — ACETAMINOPHEN 325 MG/1
650 TABLET ORAL
Status: COMPLETED | OUTPATIENT
Start: 2019-10-28 | End: 2019-10-28

## 2019-10-28 RX ORDER — HYDROCORTISONE SODIUM SUCCINATE 100 MG/2ML
100 INJECTION, POWDER, FOR SOLUTION INTRAMUSCULAR; INTRAVENOUS AS NEEDED
Status: DISCONTINUED | OUTPATIENT
Start: 2019-10-28 | End: 2019-10-29 | Stop reason: HOSPADM

## 2019-10-28 RX ORDER — ONDANSETRON 2 MG/ML
8 INJECTION INTRAMUSCULAR; INTRAVENOUS AS NEEDED
Status: DISCONTINUED | OUTPATIENT
Start: 2019-10-28 | End: 2019-10-29 | Stop reason: HOSPADM

## 2019-10-28 RX ADMIN — Medication 10 ML: at 15:39

## 2019-10-28 RX ADMIN — DIPHENHYDRAMINE HYDROCHLORIDE 25 MG: 25 CAPSULE ORAL at 13:11

## 2019-10-28 RX ADMIN — ACETAMINOPHEN 650 MG: 325 TABLET, FILM COATED ORAL at 13:11

## 2019-10-28 RX ADMIN — SODIUM CHLORIDE 250 ML: 900 INJECTION, SOLUTION INTRAVENOUS at 13:11

## 2019-10-28 NOTE — PROGRESS NOTES
Arrived to the [facility]. Labs completed. Patient required 1 unit PRBC, tolerated without problems Any issues or concerns during appointment: no. 
Patient aware of next infusion appointment on 10/31/19 (date) at 0930 (time). Discharged ambulatory with spouse.

## 2019-10-29 LAB
BACTERIA SPEC CULT: NORMAL
SERVICE CMNT-IMP: NORMAL

## 2019-10-31 ENCOUNTER — PATIENT OUTREACH (OUTPATIENT)
Dept: CASE MANAGEMENT | Age: 74
End: 2019-10-31

## 2019-10-31 ENCOUNTER — HOSPITAL ENCOUNTER (OUTPATIENT)
Dept: INFUSION THERAPY | Age: 74
Discharge: HOME | End: 2019-10-31
Payer: MEDICARE

## 2019-10-31 ENCOUNTER — HOSPITAL ENCOUNTER (OUTPATIENT)
Dept: LAB | Age: 74
Discharge: HOME | End: 2019-10-31
Payer: MEDICARE

## 2019-10-31 VITALS
TEMPERATURE: 97.5 F | HEART RATE: 78 BPM | SYSTOLIC BLOOD PRESSURE: 125 MMHG | RESPIRATION RATE: 18 BRPM | OXYGEN SATURATION: 98 % | DIASTOLIC BLOOD PRESSURE: 64 MMHG

## 2019-10-31 DIAGNOSIS — C92.00 AML (ACUTE MYELOID LEUKEMIA) WITH FAILED REMISSION (HCC): ICD-10-CM

## 2019-10-31 DIAGNOSIS — C92.00 AML (ACUTE MYELOID LEUKEMIA) WITH FAILED REMISSION (HCC): Primary | ICD-10-CM

## 2019-10-31 LAB
ALBUMIN SERPL-MCNC: 2.8 G/DL (ref 3.2–4.6)
ALBUMIN/GLOB SERPL: 0.8 {RATIO} (ref 1.2–3.5)
ALP SERPL-CCNC: 95 U/L (ref 50–136)
ALT SERPL-CCNC: 28 U/L (ref 12–65)
ANION GAP SERPL CALC-SCNC: 6 MMOL/L (ref 7–16)
AST SERPL-CCNC: 29 U/L (ref 15–37)
BILIRUB SERPL-MCNC: 0.2 MG/DL (ref 0.2–1.1)
BUN SERPL-MCNC: 25 MG/DL (ref 8–23)
CALCIUM SERPL-MCNC: 8.2 MG/DL (ref 8.3–10.4)
CHLORIDE SERPL-SCNC: 110 MMOL/L (ref 98–107)
CO2 SERPL-SCNC: 27 MMOL/L (ref 21–32)
CREAT SERPL-MCNC: 1.09 MG/DL (ref 0.6–1)
DIFFERENTIAL METHOD BLD: ABNORMAL
ERYTHROCYTE [DISTWIDTH] IN BLOOD BY AUTOMATED COUNT: 12.8 % (ref 11.9–14.6)
GLOBULIN SER CALC-MCNC: 3.6 G/DL (ref 2.3–3.5)
GLUCOSE SERPL-MCNC: 124 MG/DL (ref 65–100)
HCT VFR BLD AUTO: 17.9 % (ref 35.8–46.3)
HGB BLD-MCNC: 6.1 G/DL (ref 11.7–15.4)
MAGNESIUM SERPL-MCNC: 1.8 MG/DL (ref 1.8–2.4)
MCH RBC QN AUTO: 28.4 PG (ref 26.1–32.9)
MCHC RBC AUTO-ENTMCNC: 34.1 G/DL (ref 31.4–35)
MCV RBC AUTO: 83.3 FL (ref 79.6–97.8)
NRBC # BLD: 0 K/UL (ref 0–0.2)
PLATELET # BLD AUTO: 10 K/UL (ref 150–450)
PLATELET COMMENTS,PCOM: ABNORMAL
PMV BLD AUTO: 7.9 FL (ref 9.4–12.3)
POTASSIUM SERPL-SCNC: 4 MMOL/L (ref 3.5–5.1)
PROT SERPL-MCNC: 6.4 G/DL (ref 6.3–8.2)
RBC # BLD AUTO: 2.15 M/UL (ref 4.05–5.25)
RBC MORPH BLD: ABNORMAL
SODIUM SERPL-SCNC: 143 MMOL/L (ref 136–145)
WBC # BLD AUTO: 0.4 K/UL (ref 4.3–11.1)
WBC MORPH BLD: ABNORMAL

## 2019-10-31 PROCEDURE — 86644 CMV ANTIBODY: CPT

## 2019-10-31 PROCEDURE — 74011250636 HC RX REV CODE- 250/636: Performed by: NURSE PRACTITIONER

## 2019-10-31 PROCEDURE — P9040 RBC LEUKOREDUCED IRRADIATED: HCPCS

## 2019-10-31 PROCEDURE — 77030018667 ADMN ST IV BLD FENW -A

## 2019-10-31 PROCEDURE — P9037 PLATE PHERES LEUKOREDU IRRAD: HCPCS

## 2019-10-31 PROCEDURE — 83735 ASSAY OF MAGNESIUM: CPT

## 2019-10-31 PROCEDURE — 85025 COMPLETE CBC W/AUTO DIFF WBC: CPT

## 2019-10-31 PROCEDURE — 80053 COMPREHEN METABOLIC PANEL: CPT

## 2019-10-31 PROCEDURE — 36430 TRANSFUSION BLD/BLD COMPNT: CPT

## 2019-10-31 PROCEDURE — 74011250637 HC RX REV CODE- 250/637: Performed by: NURSE PRACTITIONER

## 2019-10-31 PROCEDURE — 87045 FECES CULTURE AEROBIC BACT: CPT

## 2019-10-31 RX ORDER — ACETAMINOPHEN 325 MG/1
650 TABLET ORAL ONCE
Status: COMPLETED | OUTPATIENT
Start: 2019-10-31 | End: 2019-10-31

## 2019-10-31 RX ORDER — SODIUM CHLORIDE 0.9 % (FLUSH) 0.9 %
10 SYRINGE (ML) INJECTION AS NEEDED
Status: ACTIVE | OUTPATIENT
Start: 2019-10-31 | End: 2019-10-31

## 2019-10-31 RX ORDER — SODIUM CHLORIDE 9 MG/ML
250 INJECTION, SOLUTION INTRAVENOUS AS NEEDED
Status: CANCELLED | OUTPATIENT
Start: 2019-10-31

## 2019-10-31 RX ORDER — DIPHENHYDRAMINE HCL 25 MG
25 CAPSULE ORAL
Status: DISPENSED | OUTPATIENT
Start: 2019-10-31 | End: 2019-10-31

## 2019-10-31 RX ORDER — SODIUM CHLORIDE 9 MG/ML
250 INJECTION, SOLUTION INTRAVENOUS AS NEEDED
Status: DISCONTINUED | OUTPATIENT
Start: 2019-10-31 | End: 2019-11-04 | Stop reason: HOSPADM

## 2019-10-31 RX ORDER — ACETAMINOPHEN 325 MG/1
650 TABLET ORAL ONCE
Status: CANCELLED | OUTPATIENT
Start: 2019-10-31 | End: 2019-10-31

## 2019-10-31 RX ORDER — DIPHENHYDRAMINE HCL 25 MG
25 CAPSULE ORAL
Status: CANCELLED | OUTPATIENT
Start: 2019-10-31

## 2019-10-31 RX ORDER — SODIUM CHLORIDE 9 MG/ML
500 INJECTION, SOLUTION INTRAVENOUS ONCE
Status: DISCONTINUED | OUTPATIENT
Start: 2019-10-31 | End: 2019-10-31 | Stop reason: SDUPTHER

## 2019-10-31 RX ADMIN — Medication 10 ML: at 12:33

## 2019-10-31 RX ADMIN — Medication 10 ML: at 17:22

## 2019-10-31 RX ADMIN — ACETAMINOPHEN 650 MG: 325 TABLET, FILM COATED ORAL at 12:30

## 2019-10-31 RX ADMIN — SODIUM CHLORIDE 250 ML: 900 INJECTION, SOLUTION INTRAVENOUS at 12:35

## 2019-10-31 RX ADMIN — DIPHENHYDRAMINE HYDROCHLORIDE 25 MG: 25 CAPSULE ORAL at 12:29

## 2019-10-31 NOTE — PROGRESS NOTES
Arrived to the [facility]. Assessment, 2 units of blood, and 1 unit of platelets transfusion completed. Patient tolerated well. Any issues or concerns during appointment: Stool sample collected and sent to lab. Patient aware of next infusion appointment on 11/04/2019 (date) at 0900 (time) with infusion center. Discharged ambulatory, with spouse. Patient advised to call Dr. Kaleb Killian' office if she has any problems or concerns. Patient verbalized understanding.

## 2019-10-31 NOTE — PROGRESS NOTES
10/31/19:  Patient in for follow-up with NP, Bernardo Rachel. Patient reports completing antibiotics and no fevers for 6 days. Patient now with loose watery diarrhea since starting augmentin. Stool samples today in infusion if possible. Patient to receive 2 units of blood today (BP low, dizzy with standing that doesn't resolve) and platelets. If BP doesn't improve with blood products, she has orders for extra IVF. Patient to return on Monday for labs, possible platelets and then bone marrow biopsy. Patient to see Dr. Adebayo Neff next [address] [place] [address].

## 2019-11-03 LAB
BACTERIA SPEC CULT: NORMAL
BACTERIA SPEC CULT: NORMAL
SERVICE CMNT-IMP: NORMAL

## 2019-11-04 ENCOUNTER — HOSPITAL ENCOUNTER (OUTPATIENT)
Dept: CT IMAGING | Age: 74
Discharge: HOME | DRG: 837 | End: 2019-11-04
Attending: INTERNAL MEDICINE
Payer: MEDICARE

## 2019-11-04 ENCOUNTER — HOSPITAL ENCOUNTER (OUTPATIENT)
Dept: INFUSION THERAPY | Age: 74
Discharge: HOME | End: 2019-11-04
Payer: MEDICARE

## 2019-11-04 ENCOUNTER — HOSPITAL ENCOUNTER (OUTPATIENT)
Dept: LAB | Age: 74
Discharge: HOME | DRG: 837 | End: 2019-11-04
Attending: INTERNAL MEDICINE
Payer: MEDICARE

## 2019-11-04 VITALS
HEART RATE: 76 BPM | TEMPERATURE: 98.7 F | SYSTOLIC BLOOD PRESSURE: 126 MMHG | DIASTOLIC BLOOD PRESSURE: 60 MMHG | RESPIRATION RATE: 16 BRPM | OXYGEN SATURATION: 93 %

## 2019-11-04 VITALS
WEIGHT: 190.4 LBS | TEMPERATURE: 98 F | OXYGEN SATURATION: 98 % | BODY MASS INDEX: 30.73 KG/M2 | SYSTOLIC BLOOD PRESSURE: 111 MMHG | HEART RATE: 88 BPM | DIASTOLIC BLOOD PRESSURE: 53 MMHG | RESPIRATION RATE: 18 BRPM

## 2019-11-04 DIAGNOSIS — C92.00 AML (ACUTE MYELOID LEUKEMIA) WITH FAILED REMISSION (HCC): ICD-10-CM

## 2019-11-04 DIAGNOSIS — C92.00 ACUTE MYELOID LEUKEMIA NOT HAVING ACHIEVED REMISSION (HCC): Primary | ICD-10-CM

## 2019-11-04 LAB
ALBUMIN SERPL-MCNC: 2.8 G/DL (ref 3.2–4.6)
ALBUMIN/GLOB SERPL: 0.7 {RATIO} (ref 1.2–3.5)
ALP SERPL-CCNC: 93 U/L (ref 50–136)
ALT SERPL-CCNC: 27 U/L (ref 12–65)
ANION GAP SERPL CALC-SCNC: 8 MMOL/L (ref 7–16)
AST SERPL-CCNC: 23 U/L (ref 15–37)
BILIRUB SERPL-MCNC: 0.3 MG/DL (ref 0.2–1.1)
BONE MARROW PREP & W,BMA: NORMAL
BUN SERPL-MCNC: 20 MG/DL (ref 8–23)
CALCIUM SERPL-MCNC: 8.8 MG/DL (ref 8.3–10.4)
CHLORIDE SERPL-SCNC: 108 MMOL/L (ref 98–107)
CO2 SERPL-SCNC: 26 MMOL/L (ref 21–32)
CREAT SERPL-MCNC: 0.86 MG/DL (ref 0.6–1)
DIFFERENTIAL METHOD BLD: ABNORMAL
ERYTHROCYTE [DISTWIDTH] IN BLOOD BY AUTOMATED COUNT: 13.1 % (ref 11.9–14.6)
GLOBULIN SER CALC-MCNC: 3.9 G/DL (ref 2.3–3.5)
GLUCOSE SERPL-MCNC: 88 MG/DL (ref 65–100)
HCT VFR BLD AUTO: 21 % (ref 35.8–46.3)
HGB BLD-MCNC: 7.2 G/DL (ref 11.7–15.4)
MAGNESIUM SERPL-MCNC: 2 MG/DL (ref 1.8–2.4)
MCH RBC QN AUTO: 28.7 PG (ref 26.1–32.9)
MCHC RBC AUTO-ENTMCNC: 34.3 G/DL (ref 31.4–35)
MCV RBC AUTO: 83.7 FL (ref 79.6–97.8)
NRBC # BLD: 0 K/UL (ref 0–0.2)
PLATELET # BLD AUTO: 20 K/UL (ref 150–450)
PLATELET COMMENTS,PCOM: ABNORMAL
PMV BLD AUTO: 9.6 FL (ref 9.4–12.3)
POTASSIUM SERPL-SCNC: 3.9 MMOL/L (ref 3.5–5.1)
PROT SERPL-MCNC: 6.7 G/DL (ref 6.3–8.2)
RBC # BLD AUTO: 2.51 M/UL (ref 4.05–5.25)
RBC MORPH BLD: ABNORMAL
RBC MORPH BLD: ABNORMAL
SODIUM SERPL-SCNC: 142 MMOL/L (ref 136–145)
WBC # BLD AUTO: 0.4 K/UL (ref 4.3–11.1)
WBC MORPH BLD: ABNORMAL

## 2019-11-04 PROCEDURE — P9037 PLATE PHERES LEUKOREDU IRRAD: HCPCS

## 2019-11-04 PROCEDURE — 74011250636 HC RX REV CODE- 250/636: Performed by: PHYSICIAN ASSISTANT

## 2019-11-04 PROCEDURE — 88184 FLOWCYTOMETRY/ TC 1 MARKER: CPT

## 2019-11-04 PROCEDURE — 74011250636 HC RX REV CODE- 250/636: Performed by: RADIOLOGY

## 2019-11-04 PROCEDURE — 80053 COMPREHEN METABOLIC PANEL: CPT

## 2019-11-04 PROCEDURE — 88185 FLOWCYTOMETRY/TC ADD-ON: CPT

## 2019-11-04 PROCEDURE — 36430 TRANSFUSION BLD/BLD COMPNT: CPT

## 2019-11-04 PROCEDURE — 88305 TISSUE EXAM BY PATHOLOGIST: CPT

## 2019-11-04 PROCEDURE — 85025 COMPLETE CBC W/AUTO DIFF WBC: CPT

## 2019-11-04 PROCEDURE — 77030018667 ADMN ST IV BLD FENW -A

## 2019-11-04 PROCEDURE — 88342 IMHCHEM/IMCYTCHM 1ST ANTB: CPT

## 2019-11-04 PROCEDURE — 74011000250 HC RX REV CODE- 250: Performed by: RADIOLOGY

## 2019-11-04 PROCEDURE — 88313 SPECIAL STAINS GROUP 2: CPT

## 2019-11-04 PROCEDURE — 83735 ASSAY OF MAGNESIUM: CPT

## 2019-11-04 PROCEDURE — 86900 BLOOD TYPING SEROLOGIC ABO: CPT

## 2019-11-04 PROCEDURE — 74011250637 HC RX REV CODE- 250/637: Performed by: INTERNAL MEDICINE

## 2019-11-04 PROCEDURE — 88341 IMHCHEM/IMCYTCHM EA ADD ANTB: CPT

## 2019-11-04 PROCEDURE — 88311 DECALCIFY TISSUE: CPT

## 2019-11-04 PROCEDURE — 99152 MOD SED SAME PHYS/QHP 5/>YRS: CPT

## 2019-11-04 PROCEDURE — 74011250636 HC RX REV CODE- 250/636: Performed by: INTERNAL MEDICINE

## 2019-11-04 PROCEDURE — 77012 CT SCAN FOR NEEDLE BIOPSY: CPT

## 2019-11-04 PROCEDURE — 86644 CMV ANTIBODY: CPT

## 2019-11-04 PROCEDURE — 88312 SPECIAL STAINS GROUP 1: CPT

## 2019-11-04 RX ORDER — SODIUM CHLORIDE 9 MG/ML
250 INJECTION, SOLUTION INTRAVENOUS AS NEEDED
Status: DISCONTINUED | OUTPATIENT
Start: 2019-11-04 | End: 2019-11-07 | Stop reason: HOSPADM

## 2019-11-04 RX ORDER — ACETAMINOPHEN 325 MG/1
650 TABLET ORAL ONCE
Status: COMPLETED | OUTPATIENT
Start: 2019-11-04 | End: 2019-11-04

## 2019-11-04 RX ORDER — MIDAZOLAM HYDROCHLORIDE 1 MG/ML
.5-2 INJECTION, SOLUTION INTRAMUSCULAR; INTRAVENOUS
Status: DISCONTINUED | OUTPATIENT
Start: 2019-11-04 | End: 2019-11-07 | Stop reason: HOSPADM

## 2019-11-04 RX ORDER — SODIUM CHLORIDE 0.9 % (FLUSH) 0.9 %
10 SYRINGE (ML) INJECTION AS NEEDED
Status: DISCONTINUED | OUTPATIENT
Start: 2019-11-04 | End: 2019-11-07 | Stop reason: HOSPADM

## 2019-11-04 RX ORDER — SODIUM CHLORIDE 9 MG/ML
150 INJECTION, SOLUTION INTRAVENOUS CONTINUOUS
Status: DISCONTINUED | OUTPATIENT
Start: 2019-11-04 | End: 2019-11-05 | Stop reason: HOSPADM

## 2019-11-04 RX ORDER — LIDOCAINE HYDROCHLORIDE 20 MG/ML
2-20 INJECTION, SOLUTION INFILTRATION; PERINEURAL ONCE
Status: COMPLETED | OUTPATIENT
Start: 2019-11-04 | End: 2019-11-04

## 2019-11-04 RX ORDER — HEPARIN 100 UNIT/ML
500 SYRINGE INTRAVENOUS ONCE
Status: COMPLETED | OUTPATIENT
Start: 2019-11-04 | End: 2019-11-04

## 2019-11-04 RX ORDER — DIPHENHYDRAMINE HCL 25 MG
25 CAPSULE ORAL
Status: COMPLETED | OUTPATIENT
Start: 2019-11-04 | End: 2019-11-04

## 2019-11-04 RX ORDER — FENTANYL CITRATE 50 UG/ML
25-50 INJECTION, SOLUTION INTRAMUSCULAR; INTRAVENOUS
Status: DISCONTINUED | OUTPATIENT
Start: 2019-11-04 | End: 2019-11-07 | Stop reason: HOSPADM

## 2019-11-04 RX ORDER — SODIUM CHLORIDE 9 MG/ML
25 INJECTION, SOLUTION INTRAVENOUS CONTINUOUS
Status: DISCONTINUED | OUTPATIENT
Start: 2019-11-04 | End: 2019-11-07 | Stop reason: HOSPADM

## 2019-11-04 RX ORDER — OXYCODONE HYDROCHLORIDE 5 MG/1
10 TABLET ORAL
Status: DISCONTINUED | OUTPATIENT
Start: 2019-11-04 | End: 2019-11-07 | Stop reason: HOSPADM

## 2019-11-04 RX ADMIN — MIDAZOLAM 0.5 MG: 1 INJECTION INTRAMUSCULAR; INTRAVENOUS at 15:45

## 2019-11-04 RX ADMIN — MIDAZOLAM 1 MG: 1 INJECTION INTRAMUSCULAR; INTRAVENOUS at 15:43

## 2019-11-04 RX ADMIN — SODIUM CHLORIDE 250 ML: 900 INJECTION, SOLUTION INTRAVENOUS at 13:00

## 2019-11-04 RX ADMIN — LIDOCAINE HYDROCHLORIDE 150 MG: 20 INJECTION, SOLUTION INFILTRATION; PERINEURAL at 15:46

## 2019-11-04 RX ADMIN — FENTANYL CITRATE 50 MCG: 50 INJECTION, SOLUTION INTRAMUSCULAR; INTRAVENOUS at 15:43

## 2019-11-04 RX ADMIN — SODIUM CHLORIDE 25 ML/HR: 900 INJECTION, SOLUTION INTRAVENOUS at 15:15

## 2019-11-04 RX ADMIN — FENTANYL CITRATE 25 MCG: 50 INJECTION, SOLUTION INTRAMUSCULAR; INTRAVENOUS at 15:45

## 2019-11-04 RX ADMIN — DIPHENHYDRAMINE HYDROCHLORIDE 25 MG: 25 CAPSULE ORAL at 12:47

## 2019-11-04 RX ADMIN — Medication 10 ML: at 10:10

## 2019-11-04 RX ADMIN — ACETAMINOPHEN 650 MG: 325 TABLET, FILM COATED ORAL at 12:47

## 2019-11-04 RX ADMIN — Medication 500 UNITS: at 16:43

## 2019-11-04 RX ADMIN — Medication 10 ML: at 13:30

## 2019-11-04 NOTE — PROGRESS NOTES
Recovery period without difficulty. Pt alert and oriented and denies pain. Dressing is clean, dry, and intact. Reviewed discharge instructions with patient and family  , both verbalized understanding. Pt escorted to lobby discharge area via wheelchair. Vital signs and Oscar score completed.

## 2019-11-04 NOTE — DISCHARGE INSTRUCTIONS
[address]  Department of Interventional Radiology  [address] [place] [address] [address] [facility]  [phone] Office  [phone] Fax    BIOPSY DISCHARGE INSTRUCTIONS    General Instructions:     A biopsy is the removal of a small piece of tissue for microscopic examination or testing. Healthy tissue can be obtained for the purpose of tissue-type matching for transplants. Unhealthy tissues are more commonly biopsied to diagnose disease. Home Care Instructions: You may resume your regular diet and medication regimen. Do not drink alcohol, drive, or make any important legal decisions in the next 24 hours. Do not lift anything heavier than a gallon of milk until the soreness goes away. You may use over the counter acetaminophen or ibuprofen for the soreness. You may apply an ice pack to the affected area for 20-30 minutes at time for the first 24 hours. After that, you may apply a heat pack. Call If: You should call your Physician and/or the Radiology Nurse if you have any questions or concerns about the biopsy site. Call if you should have increased pain, fever, redness, drainage, or bleeding more than a small spot on the bandage. Follow-Up Instructions: Please see your ordering doctor as he/she has requested. To Reach Us: If you have any questions about your procedure, please call the Interventional Radiology department at [phone]. After business hours (5pm) and weekends, call the answering service at [phone] and ask for the Radiologist on call to be paged.      Interventional Radiology General Nurse Discharge    After general anesthesia or intravenous sedation, for 24 hours or while taking prescription Narcotics:  · Limit your activities  · Do not drive and operate hazardous machinery  · Do not make important personal or business decisions  · Do  not drink alcoholic beverages  · If you have not urinated within 8 hours after discharge, please contact your surgeon on call. * Please give a list of your current medications to your Primary Care Provider. * Please update this list whenever your medications are discontinued, doses are     changed, or new medications (including over-the-counter products) are added. * Please carry medication information at all times in case of emergency situations. These are general instructions for a healthy lifestyle:    No smoking/ No tobacco products/ Avoid exposure to second hand smoke  Surgeon General's Warning:  Quitting smoking now greatly reduces serious risk to your health. Obesity, smoking, and sedentary lifestyle greatly increases your risk for illness  A healthy diet, regular physical exercise & weight monitoring are important for maintaining a healthy lifestyle    You may be retaining fluid if you have a history of heart failure or if you experience any of the following symptoms:  Weight gain of 3 pounds or more overnight or 5 pounds in a week, increased swelling in our hands or feet or shortness of breath while lying flat in bed. Please call your doctor as soon as you notice any of these symptoms; do not wait until your next office visit. Recognize signs and symptoms of STROKE:  F-face looks uneven    A-arms unable to move or move unevenly    S-speech slurred or non-existent    T-time-call 911 as soon as signs and symptoms begin-DO NOT go       Back to bed or wait to see if you get better-TIME IS BRAIN.             Patient Signature:  Date: 11/4/2019  Discharging Nurse: Bozena Orellana RN

## 2019-11-04 NOTE — PROCEDURES
Department of Interventional Radiology  [phone]        Interventional Radiology Brief Procedure Note    Patient: Ju Russell MRN: [id number]  SSN: [id number]    YOB: 1945  Age: 68 y.o. Sex: female      Date of Procedure: 11/4/2019    Pre-Procedure Diagnosis: AML    Post-Procedure Diagnosis: SAME    Procedure(s): Image Guided Biopsy    Brief Description of Procedure: Right Iliac Bone Marrow Aspiration and core biopsy. Performed By: Gail Chapman MD     Assistants: None    Anesthesia:Moderate Sedation    Estimated Blood Loss: Less than 10ml    Specimens:  Pathology    Implants:  None    Findings: Unremarkable.      Complications: None    Recommendations: 1 hour bedrest.       Follow Up: Dr Vinay Lkahani    Signed By: Gail Chapman MD     November 4, 2019

## 2019-11-04 NOTE — PROGRESS NOTES
Pt to area c/o generalized weakness. Noted platelets 20 today, received 1 unit platelets for bone marrow biopsy later today, tolerated well. No return appt at this time. Advised to call dr with any issues/concerns. Discharged home without complaints.

## 2019-11-04 NOTE — PROGRESS NOTES
TRANSFER - OUT REPORT:    Verbal report given to Charleen Tom RN on [employer]  being transferred to IR prep room 7 for routine post - op       Report consisted of patients Situation, Background, Assessment and   Recommendations(SBAR). Information from the following report(s) SBAR, Kardex, Procedure Summary and MAR was reviewed with the receiving nurse. Lines:   Venous Access Device Bioflo Double chest port  07/15/19 Upper chest (subclavicular area), left (Active)   Central Line Being Utilized Yes 11/4/2019 10:10 AM   Criteria for Appropriate Use Long term IV/antibiotic administration 11/4/2019 10:10 AM   Site Assessment Clean, dry, & intact 11/4/2019 10:10 AM   Date of Last Dressing Change 11/04/19 11/4/2019 10:10 AM   Dressing Status New 11/4/2019 10:10 AM   Dressing Type Disk with Chlorhexadine gluconate (CHG); Transparent 11/4/2019 10:10 AM   Action Taken Blood drawn 11/4/2019 10:10 AM   Date Accessed (Medial Site) 11/04/19 11/4/2019 10:10 AM   Access Time (Medial Site) 1010 11/4/2019 10:10 AM   Access Needle Size (Site #1) 20 G 11/4/2019 10:10 AM   Access Needle Length (Medial Site) 0.75 inches 11/4/2019 10:10 AM   Positive Blood Return (Medial Site) Yes 11/4/2019 10:10 AM   Action Taken (Medial Site) Flushed 11/4/2019 10:10 AM        Opportunity for questions and clarification was provided.

## 2019-11-04 NOTE — H&P
Department of Interventional Radiology  [phone]    History and Physical    Patient:  Naty Alexander MRN:  [id number]  SSN:  [id number]    YOB: 1945  Age:  68 y.o. Sex:  female      Primary Care Provider:  Ariadne Leigh MD  Referring Physician:  Ubaldo Christina MD    Subjective:     Chief Complaint: biopsy    History of the Present Illness: The patient is a 68 y.o. female who presents for bone marrow biopsy. AML. Received platelet transfusion this morning. NPO. Cha Ross Past Medical History:   Diagnosis Date    AML (acute myeloid leukemia) ([facility] [address] dx- 4/2019    followed by dr Manuel Wright    Depression     Hypercholesterolemia     Infection     of port -- was placed 5/2019-- removed 6/2019---right chest    Psychiatric disorder     aniexty    Sleep apnea      Past Surgical History:   Procedure Laterality Date    HX OTHER SURGICAL      colonoscopy    HX VASCULAR ACCESS      IR INSERT TUNL CVC W PORT OVER 5 YEARS  4/30/2019    IR INSERT TUNL CVC W PORT OVER 5 YEARS  7/15/2019    IR REMOVE TUNL CVAD W PORT/PUMP  6/13/2019        Review of Systems:    Pertinent items are noted in the History of Present Illness. Prior to Admission medications    Medication Sig Start Date End Date Taking? Authorizing Provider   gilteritinib (XOSPATA) 40 mg tab Take 120 mg by mouth daily (with lunch). 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 10/10/19 11/9/19  Jocelyne Werner NP   voriconazole (VFEND) 200 mg tablet Take 1 Tab by mouth every twelve (12) hours. 10/10/19   Jocelyne Werner NP   DULoxetine (CYMBALTA) 30 mg capsule Take 1 Cap by mouth daily. 8/5/19   Ubaldo Christina MD   zolpidem (AMBIEN) 5 mg tablet Take 1 Tab by mouth nightly as needed for Sleep.  Max Daily Amount: 5 mg. 8/1/19   Jose Carlos Friedman NP   lidocaine-prilocaine (EMLA) topical cream Apply  to affected area as needed for Pain. 7/18/19   Rosie Montoya NP   cholecalciferol (VITAMIN D3) 400 unit tab tablet Take 2 Tabs by mouth daily. 6/7/19   Darby Nunez NP   ondansetron hcl (ZOFRAN) 8 mg tablet Take 1 Tab by mouth every eight (8) hours as needed for Nausea. 4/30/19   Christina Jimenez NP   vit C/E/zinc/lutein/zeaxanthin ([address] PO) Take 1 Tab by mouth daily. Provider, Historical   LORazepam (ATIVAN) 1 mg tablet Take  by mouth nightly. Provider, Historical   acetaminophen 500 mg tab 500 mg, diphenhydrAMINE 25 mg cap 25 mg Take  by mouth nightly. Provider, Historical   pravastatin (PRAVACHOL) 10 mg tablet Take  by mouth nightly.     Provider, Historical        No Known Allergies    Family History   Problem Relation Age of Onset    Cancer Father      Social History     Tobacco Use    Smoking status: Never Smoker    Smokeless tobacco: Never Used   Substance Use Topics    Alcohol use: Never     Frequency: Never        Objective:       Physical Examination:    Vitals:    11/04/19 1436   BP: 178/76   Pulse: 79   Resp: 18   Temp: 98.7 °F (37.1 °C)   SpO2: 95%       Pain Assessment                  HEART: regular rate and rhythm, systolic murmur  LUNG: clear to auscultation bilaterally  ABDOMEN: normal findings: soft, non-tender  EXTREMITIES: normal strength, tone, and muscle mass    Laboratory:     Lab Results   Component Value Date/Time    Sodium 142 11/04/2019 10:17 AM    Sodium 143 10/31/2019 08:34 AM    Potassium 3.9 11/04/2019 10:17 AM    Potassium 4.0 10/31/2019 08:34 AM    Chloride 108 (H) 11/04/2019 10:17 AM    Chloride 110 (H) 10/31/2019 08:34 AM    CO2 26 11/04/2019 10:17 AM    CO2 27 10/31/2019 08:34 AM    Anion gap 8 11/04/2019 10:17 AM    Anion gap 6 (L) 10/31/2019 08:34 AM    Glucose 88 11/04/2019 10:17 AM    Glucose 124 (H) 10/31/2019 08:34 AM    BUN 20 11/04/2019 10:17 AM    BUN 25 (H) 10/31/2019 08:34 AM    Creatinine 0.86 11/04/2019 10:17 AM    Creatinine 1.09 (H) 10/31/2019 08:34 AM GFR est AA >60 11/04/2019 10:17 AM    GFR est AA >60 10/31/2019 08:34 AM    GFR est non-AA >60 11/04/2019 10:17 AM    GFR est non-AA 52 (L) 10/31/2019 08:34 AM    Calcium 8.8 11/04/2019 10:17 AM    Calcium 8.2 (L) 10/31/2019 08:34 AM    Magnesium 2.0 11/04/2019 10:17 AM    Magnesium 1.8 10/31/2019 08:34 AM    Phosphorus 2.0 (L) 09/23/2019 03:28 AM    Phosphorus 3.9 (H) 07/09/2019 03:58 PM    Albumin 2.8 (L) 11/04/2019 10:17 AM    Albumin 2.8 (L) 10/31/2019 08:34 AM    Protein, total 6.7 11/04/2019 10:17 AM    Protein, total 6.4 10/31/2019 08:34 AM    Globulin 3.9 (H) 11/04/2019 10:17 AM    Globulin 3.6 (H) 10/31/2019 08:34 AM    A-G Ratio 0.7 (L) 11/04/2019 10:17 AM    A-G Ratio 0.8 (L) 10/31/2019 08:34 AM    AST (SGOT) 23 11/04/2019 10:17 AM    AST (SGOT) 29 10/31/2019 08:34 AM    ALT (SGPT) 27 11/04/2019 10:17 AM    ALT (SGPT) 28 10/31/2019 08:34 AM     Lab Results   Component Value Date/Time    WBC 0.4 (LL) 11/04/2019 10:17 AM    WBC 0.4 (LL) 10/31/2019 08:34 AM    HGB 7.2 (L) 11/04/2019 10:17 AM    HGB 6.1 (LL) 10/31/2019 08:34 AM    HCT 21.0 (LL) 11/04/2019 10:17 AM    HCT 17.9 (LL) 10/31/2019 08:34 AM    PLATELET 20 (LL) 67/88/4197 10:17 AM    PLATELET 10 (LL) 49/35/8410 08:34 AM     Lab Results   Component Value Date/Time    aPTT 33.4 04/30/2019 02:58 PM    Prothrombin time 17.6 (H) 04/30/2019 02:58 PM    INR 1.5 04/30/2019 02:58 PM       Assessment:     AML    Hospital Problems  Date Reviewed: 10/31/2019    None          Plan:     Planned Procedure:  Bone marrow biopsy    Risks, benefits, and alternatives reviewed with patient and she agrees to proceed with the procedure.       Signed By: Frank Worrell PA-C     November 4, 2019

## 2019-11-05 LAB
BLD PROD TYP BPU: NORMAL
BPU ID: NORMAL
STATUS OF UNIT,%ST: NORMAL
UNIT DIVISION, %UDIV: 0

## 2019-11-06 ENCOUNTER — PATIENT OUTREACH (OUTPATIENT)
Dept: CASE MANAGEMENT | Age: 74
End: 2019-11-06

## 2019-11-06 ENCOUNTER — HOSPITAL ENCOUNTER (OUTPATIENT)
Dept: INFUSION THERAPY | Age: 74
Discharge: HOME | End: 2019-11-06
Payer: MEDICARE

## 2019-11-06 ENCOUNTER — HOSPITAL ENCOUNTER (OUTPATIENT)
Dept: LAB | Age: 74
Discharge: HOME | End: 2019-11-06
Payer: MEDICARE

## 2019-11-06 VITALS
DIASTOLIC BLOOD PRESSURE: 68 MMHG | OXYGEN SATURATION: 99 % | HEART RATE: 80 BPM | RESPIRATION RATE: 16 BRPM | TEMPERATURE: 98.6 F | SYSTOLIC BLOOD PRESSURE: 137 MMHG

## 2019-11-06 DIAGNOSIS — C92.00 ACUTE MYELOID LEUKEMIA NOT HAVING ACHIEVED REMISSION (HCC): ICD-10-CM

## 2019-11-06 DIAGNOSIS — C92.00 AML (ACUTE MYELOID LEUKEMIA) WITH FAILED REMISSION (HCC): ICD-10-CM

## 2019-11-06 LAB
ABO + RH BLD: NORMAL
ALBUMIN SERPL-MCNC: 2.7 G/DL (ref 3.2–4.6)
ALBUMIN/GLOB SERPL: 0.7 {RATIO} (ref 1.2–3.5)
ALP SERPL-CCNC: 92 U/L (ref 50–136)
ALT SERPL-CCNC: 28 U/L (ref 12–65)
ANION GAP SERPL CALC-SCNC: 5 MMOL/L (ref 7–16)
AST SERPL-CCNC: 25 U/L (ref 15–37)
BILIRUB SERPL-MCNC: 0.2 MG/DL (ref 0.2–1.1)
BLOOD GROUP ANTIBODIES SERPL: NORMAL
BUN SERPL-MCNC: 19 MG/DL (ref 8–23)
CALCIUM SERPL-MCNC: 8.1 MG/DL (ref 8.3–10.4)
CHLORIDE SERPL-SCNC: 108 MMOL/L (ref 98–107)
CO2 SERPL-SCNC: 28 MMOL/L (ref 21–32)
CREAT SERPL-MCNC: 1.02 MG/DL (ref 0.6–1)
DIFFERENTIAL METHOD BLD: ABNORMAL
ERYTHROCYTE [DISTWIDTH] IN BLOOD BY AUTOMATED COUNT: 13 % (ref 11.9–14.6)
FLOW CYTOMETRY, FBTC1: NORMAL
GLOBULIN SER CALC-MCNC: 3.7 G/DL (ref 2.3–3.5)
GLUCOSE SERPL-MCNC: 126 MG/DL (ref 65–100)
HCT VFR BLD AUTO: 18.4 % (ref 35.8–46.3)
HGB BLD-MCNC: 6.2 G/DL (ref 11.7–15.4)
MAGNESIUM SERPL-MCNC: 2 MG/DL (ref 1.8–2.4)
MCH RBC QN AUTO: 28.6 PG (ref 26.1–32.9)
MCHC RBC AUTO-ENTMCNC: 33.7 G/DL (ref 31.4–35)
MCV RBC AUTO: 84.8 FL (ref 79.6–97.8)
NRBC # BLD: 0 K/UL (ref 0–0.2)
PLATELET # BLD AUTO: 44 K/UL (ref 150–450)
PLATELET COMMENTS,PCOM: ABNORMAL
PMV BLD AUTO: 10.5 FL (ref 9.4–12.3)
POTASSIUM SERPL-SCNC: 3.9 MMOL/L (ref 3.5–5.1)
PROT SERPL-MCNC: 6.4 G/DL (ref 6.3–8.2)
RBC # BLD AUTO: 2.17 M/UL (ref 4.05–5.25)
RBC MORPH BLD: ABNORMAL
SODIUM SERPL-SCNC: 141 MMOL/L (ref 136–145)
SPECIMEN EXP DATE BLD: NORMAL
SPECIMEN SOURCE: NORMAL
TEST ORDERED:: NORMAL
WBC # BLD AUTO: 0.3 K/UL (ref 4.3–11.1)
WBC MORPH BLD: ABNORMAL

## 2019-11-06 PROCEDURE — 83735 ASSAY OF MAGNESIUM: CPT

## 2019-11-06 PROCEDURE — P9040 RBC LEUKOREDUCED IRRADIATED: HCPCS

## 2019-11-06 PROCEDURE — 85025 COMPLETE CBC W/AUTO DIFF WBC: CPT

## 2019-11-06 PROCEDURE — 86900 BLOOD TYPING SEROLOGIC ABO: CPT

## 2019-11-06 PROCEDURE — 86923 COMPATIBILITY TEST ELECTRIC: CPT

## 2019-11-06 PROCEDURE — 80053 COMPREHEN METABOLIC PANEL: CPT

## 2019-11-06 PROCEDURE — 86644 CMV ANTIBODY: CPT

## 2019-11-06 PROCEDURE — 74011250636 HC RX REV CODE- 250/636: Performed by: INTERNAL MEDICINE

## 2019-11-06 PROCEDURE — 36430 TRANSFUSION BLD/BLD COMPNT: CPT

## 2019-11-06 PROCEDURE — 77030018667 ADMN ST IV BLD FENW -A

## 2019-11-06 PROCEDURE — 74011250637 HC RX REV CODE- 250/637: Performed by: INTERNAL MEDICINE

## 2019-11-06 RX ORDER — ACETAMINOPHEN 325 MG/1
650 TABLET ORAL ONCE
Status: COMPLETED | OUTPATIENT
Start: 2019-11-06 | End: 2019-11-06

## 2019-11-06 RX ORDER — SODIUM CHLORIDE 0.9 % (FLUSH) 0.9 %
10 SYRINGE (ML) INJECTION AS NEEDED
Status: DISCONTINUED | OUTPATIENT
Start: 2019-11-06 | End: 2019-11-10 | Stop reason: HOSPADM

## 2019-11-06 RX ORDER — DIPHENHYDRAMINE HCL 25 MG
25 CAPSULE ORAL
Status: DISPENSED | OUTPATIENT
Start: 2019-11-06 | End: 2019-11-06

## 2019-11-06 RX ORDER — SODIUM CHLORIDE 9 MG/ML
250 INJECTION, SOLUTION INTRAVENOUS AS NEEDED
Status: DISCONTINUED | OUTPATIENT
Start: 2019-11-06 | End: 2019-11-10 | Stop reason: HOSPADM

## 2019-11-06 RX ADMIN — Medication 10 ML: at 15:30

## 2019-11-06 RX ADMIN — DIPHENHYDRAMINE HYDROCHLORIDE 25 MG: 25 CAPSULE ORAL at 13:37

## 2019-11-06 RX ADMIN — ACETAMINOPHEN 650 MG: 325 TABLET, FILM COATED ORAL at 13:37

## 2019-11-06 RX ADMIN — SODIUM CHLORIDE 250 ML: 900 INJECTION, SOLUTION INTRAVENOUS at 13:35

## 2019-11-06 RX ADMIN — Medication 10 ML: at 13:35

## 2019-11-06 NOTE — PROGRESS NOTES
11/6/19:  Patient in for follow-up with Dr. Laith Montgomery after bone marrow biopsy. Dr. Laith Montgomery reviewed results with the patient and discussed need to change treatment. Patient in agreement with plan. She will stop gilteritinb and switch to FLAG/VENITA. Patient to receive 1 unit of blood today in infusion and plan to be a direct admit on 11/7 around 10 am.  Admitting office and inpatient team made aware of upcoming admit. Patient to have bone marrow biopsy around day 21 or when counts recover.

## 2019-11-07 ENCOUNTER — HOSPITAL ENCOUNTER (INPATIENT)
Age: 74
LOS: 35 days | Discharge: HOME | DRG: 837 | End: 2019-12-12
Attending: INTERNAL MEDICINE | Admitting: INTERNAL MEDICINE
Payer: MEDICARE

## 2019-11-07 DIAGNOSIS — D70.9 FEBRILE NEUTROPENIA (HCC): ICD-10-CM

## 2019-11-07 DIAGNOSIS — L03.221 CELLULITIS OF NECK: ICD-10-CM

## 2019-11-07 DIAGNOSIS — R78.81 BACTEREMIA: ICD-10-CM

## 2019-11-07 DIAGNOSIS — R53.1 WEAKNESS GENERALIZED: ICD-10-CM

## 2019-11-07 DIAGNOSIS — D61.810 PANCYTOPENIA DUE TO ANTINEOPLASTIC CHEMOTHERAPY (HCC): ICD-10-CM

## 2019-11-07 DIAGNOSIS — T80.212D PORT OR RESERVOIR INFECTION, SUBSEQUENT ENCOUNTER: ICD-10-CM

## 2019-11-07 DIAGNOSIS — D61.818 PANCYTOPENIA (HCC): ICD-10-CM

## 2019-11-07 DIAGNOSIS — R50.81 FEBRILE NEUTROPENIA (HCC): ICD-10-CM

## 2019-11-07 DIAGNOSIS — R63.0 ANOREXIA: ICD-10-CM

## 2019-11-07 DIAGNOSIS — D84.81 IMMUNOCOMPROMISED STATUS ASSOCIATED WITH INFECTION (HCC): ICD-10-CM

## 2019-11-07 DIAGNOSIS — C92.00 ACUTE MYELOID LEUKEMIA NOT HAVING ACHIEVED REMISSION (HCC): Primary | ICD-10-CM

## 2019-11-07 DIAGNOSIS — B99.9 IMMUNOCOMPROMISED STATUS ASSOCIATED WITH INFECTION (HCC): ICD-10-CM

## 2019-11-07 DIAGNOSIS — Z51.11 ADMISSION FOR ANTINEOPLASTIC CHEMOTHERAPY: ICD-10-CM

## 2019-11-07 DIAGNOSIS — T45.1X5A PANCYTOPENIA DUE TO ANTINEOPLASTIC CHEMOTHERAPY (HCC): ICD-10-CM

## 2019-11-07 DIAGNOSIS — S04.40XA: ICD-10-CM

## 2019-11-07 DIAGNOSIS — D69.6 THROMBOCYTOPENIA (HCC): ICD-10-CM

## 2019-11-07 DIAGNOSIS — R19.7 DIARRHEA, UNSPECIFIED TYPE: ICD-10-CM

## 2019-11-07 DIAGNOSIS — R21 RASH: ICD-10-CM

## 2019-11-07 LAB
ALBUMIN SERPL-MCNC: 2.5 G/DL (ref 3.2–4.6)
ALBUMIN/GLOB SERPL: 0.7 {RATIO} (ref 1.2–3.5)
ALP SERPL-CCNC: 85 U/L (ref 50–136)
ALT SERPL-CCNC: 23 U/L (ref 12–65)
ANION GAP SERPL CALC-SCNC: 6 MMOL/L (ref 7–16)
AST SERPL-CCNC: 22 U/L (ref 15–37)
BILIRUB SERPL-MCNC: 0.2 MG/DL (ref 0.2–1.1)
BUN SERPL-MCNC: 22 MG/DL (ref 8–23)
CALCIUM SERPL-MCNC: 8.4 MG/DL (ref 8.3–10.4)
CHLORIDE SERPL-SCNC: 109 MMOL/L (ref 98–107)
CO2 SERPL-SCNC: 27 MMOL/L (ref 21–32)
CREAT SERPL-MCNC: 0.85 MG/DL (ref 0.6–1)
DIFFERENTIAL METHOD BLD: ABNORMAL
ERYTHROCYTE [DISTWIDTH] IN BLOOD BY AUTOMATED COUNT: 13.4 % (ref 11.9–14.6)
GLOBULIN SER CALC-MCNC: 3.5 G/DL (ref 2.3–3.5)
GLUCOSE SERPL-MCNC: 84 MG/DL (ref 65–100)
HCT VFR BLD AUTO: 19.7 % (ref 35.8–46.3)
HGB BLD-MCNC: 6.7 G/DL (ref 11.7–15.4)
MAGNESIUM SERPL-MCNC: 2 MG/DL (ref 1.8–2.4)
MCH RBC QN AUTO: 29.6 PG (ref 26.1–32.9)
MCHC RBC AUTO-ENTMCNC: 34 G/DL (ref 31.4–35)
MCV RBC AUTO: 87.2 FL (ref 79.6–97.8)
NRBC # BLD: 0 K/UL (ref 0–0.2)
PLATELET # BLD AUTO: 28 K/UL (ref 150–450)
PLATELET COMMENTS,PCOM: ABNORMAL
PMV BLD AUTO: 10.9 FL (ref 9.4–12.3)
POTASSIUM SERPL-SCNC: 4.2 MMOL/L (ref 3.5–5.1)
PROT SERPL-MCNC: 6 G/DL (ref 6.3–8.2)
RBC # BLD AUTO: 2.26 M/UL (ref 4.05–5.2)
RBC MORPH BLD: ABNORMAL
SODIUM SERPL-SCNC: 142 MMOL/L (ref 136–145)
WBC # BLD AUTO: 0.3 K/UL (ref 4.3–11.1)
WBC MORPH BLD: ABNORMAL

## 2019-11-07 PROCEDURE — P9040 RBC LEUKOREDUCED IRRADIATED: HCPCS

## 2019-11-07 PROCEDURE — 99222 1ST HOSP IP/OBS MODERATE 55: CPT | Performed by: INTERNAL MEDICINE

## 2019-11-07 PROCEDURE — 77030003560 HC NDL HUBR BARD -A

## 2019-11-07 PROCEDURE — 77030020263 HC SOL INJ SOD CL0.9% LFCR 1000ML

## 2019-11-07 PROCEDURE — 74011250637 HC RX REV CODE- 250/637: Performed by: NURSE PRACTITIONER

## 2019-11-07 PROCEDURE — 80053 COMPREHEN METABOLIC PANEL: CPT

## 2019-11-07 PROCEDURE — 86644 CMV ANTIBODY: CPT

## 2019-11-07 PROCEDURE — 93306 TTE W/DOPPLER COMPLETE: CPT

## 2019-11-07 PROCEDURE — 85025 COMPLETE CBC W/AUTO DIFF WBC: CPT

## 2019-11-07 PROCEDURE — 36430 TRANSFUSION BLD/BLD COMPNT: CPT

## 2019-11-07 PROCEDURE — 74011250637 HC RX REV CODE- 250/637: Performed by: INTERNAL MEDICINE

## 2019-11-07 PROCEDURE — 83735 ASSAY OF MAGNESIUM: CPT

## 2019-11-07 PROCEDURE — 74011250636 HC RX REV CODE- 250/636: Performed by: NURSE PRACTITIONER

## 2019-11-07 PROCEDURE — 30233N1 TRANSFUSION OF NONAUTOLOGOUS RED BLOOD CELLS INTO PERIPHERAL VEIN, PERCUTANEOUS APPROACH: ICD-10-PCS | Performed by: NURSE PRACTITIONER

## 2019-11-07 PROCEDURE — 65270000029 HC RM PRIVATE

## 2019-11-07 PROCEDURE — 65270000015 HC RM PRIVATE ONCOLOGY

## 2019-11-07 RX ORDER — MORPHINE SULFATE 2 MG/ML
2 INJECTION, SOLUTION INTRAMUSCULAR; INTRAVENOUS
Status: DISCONTINUED | OUTPATIENT
Start: 2019-11-07 | End: 2019-12-12 | Stop reason: HOSPADM

## 2019-11-07 RX ORDER — PRAVASTATIN SODIUM 20 MG/1
10 TABLET ORAL
Status: DISCONTINUED | OUTPATIENT
Start: 2019-11-07 | End: 2019-12-12 | Stop reason: HOSPADM

## 2019-11-07 RX ORDER — CYANOCOBALAMIN (VITAMIN B-12) 500 MCG
800 TABLET ORAL DAILY
Status: DISCONTINUED | OUTPATIENT
Start: 2019-11-08 | End: 2019-12-12 | Stop reason: HOSPADM

## 2019-11-07 RX ORDER — HYDROCODONE BITARTRATE AND ACETAMINOPHEN 5; 325 MG/1; MG/1
1 TABLET ORAL
Status: DISCONTINUED | OUTPATIENT
Start: 2019-11-07 | End: 2019-12-12 | Stop reason: HOSPADM

## 2019-11-07 RX ORDER — ALLOPURINOL 300 MG/1
300 TABLET ORAL DAILY
Status: DISCONTINUED | OUTPATIENT
Start: 2019-11-08 | End: 2019-12-12 | Stop reason: HOSPADM

## 2019-11-07 RX ORDER — DULOXETIN HYDROCHLORIDE 30 MG/1
30 CAPSULE, DELAYED RELEASE ORAL DAILY
Status: DISCONTINUED | OUTPATIENT
Start: 2019-11-08 | End: 2019-12-12 | Stop reason: HOSPADM

## 2019-11-07 RX ORDER — ACYCLOVIR 800 MG/1
400 TABLET ORAL 2 TIMES DAILY
Status: DISCONTINUED | OUTPATIENT
Start: 2019-11-07 | End: 2019-12-12 | Stop reason: HOSPADM

## 2019-11-07 RX ORDER — LEVOFLOXACIN 500 MG/1
500 TABLET, FILM COATED ORAL EVERY 24 HOURS
Status: DISCONTINUED | OUTPATIENT
Start: 2019-11-07 | End: 2019-11-18

## 2019-11-07 RX ORDER — SODIUM CHLORIDE 9 MG/ML
75 INJECTION, SOLUTION INTRAVENOUS CONTINUOUS
Status: DISCONTINUED | OUTPATIENT
Start: 2019-11-07 | End: 2019-11-13

## 2019-11-07 RX ORDER — LIDOCAINE AND PRILOCAINE 25; 25 MG/G; MG/G
CREAM TOPICAL AS NEEDED
Status: DISCONTINUED | OUTPATIENT
Start: 2019-11-07 | End: 2019-12-12 | Stop reason: HOSPADM

## 2019-11-07 RX ORDER — ONDANSETRON 2 MG/ML
4 INJECTION INTRAMUSCULAR; INTRAVENOUS
Status: DISCONTINUED | OUTPATIENT
Start: 2019-11-07 | End: 2019-12-12 | Stop reason: HOSPADM

## 2019-11-07 RX ORDER — ACETAMINOPHEN 325 MG/1
650 TABLET ORAL
Status: DISCONTINUED | OUTPATIENT
Start: 2019-11-07 | End: 2019-12-12 | Stop reason: HOSPADM

## 2019-11-07 RX ORDER — VORICONAZOLE 200 MG/1
200 TABLET, FILM COATED ORAL EVERY 12 HOURS
Status: DISCONTINUED | OUTPATIENT
Start: 2019-11-07 | End: 2019-12-12 | Stop reason: HOSPADM

## 2019-11-07 RX ORDER — LORAZEPAM 1 MG/1
1 TABLET ORAL
Status: DISCONTINUED | OUTPATIENT
Start: 2019-11-07 | End: 2019-12-12 | Stop reason: HOSPADM

## 2019-11-07 RX ORDER — DIPHENHYDRAMINE HCL 25 MG
25 CAPSULE ORAL
Status: DISCONTINUED | OUTPATIENT
Start: 2019-11-07 | End: 2019-12-12 | Stop reason: HOSPADM

## 2019-11-07 RX ADMIN — DIPHENHYDRAMINE HYDROCHLORIDE 25 MG: 25 CAPSULE ORAL at 14:41

## 2019-11-07 RX ADMIN — ACETAMINOPHEN 650 MG: 325 TABLET, FILM COATED ORAL at 14:41

## 2019-11-07 RX ADMIN — LORAZEPAM 1 MG: 1 TABLET ORAL at 21:38

## 2019-11-07 RX ADMIN — ACYCLOVIR 400 MG: 800 TABLET ORAL at 17:38

## 2019-11-07 RX ADMIN — DIPHENHYDRAMINE HYDROCHLORIDE 25 MG: 25 CAPSULE ORAL at 21:47

## 2019-11-07 RX ADMIN — PRAVASTATIN SODIUM 10 MG: 20 TABLET ORAL at 21:47

## 2019-11-07 RX ADMIN — LEVOFLOXACIN 500 MG: 500 TABLET, FILM COATED ORAL at 14:25

## 2019-11-07 RX ADMIN — VORICONAZOLE 200 MG: 200 TABLET, FILM COATED ORAL at 21:38

## 2019-11-07 RX ADMIN — SODIUM CHLORIDE 75 ML/HR: 900 INJECTION, SOLUTION INTRAVENOUS at 11:00

## 2019-11-07 NOTE — PROGRESS NOTES
Massage Therapy Note Gentle massage of lower legs and feet using hypoallergenic massage lotion for 20 minutes while patient supine. Pain level in legs and feet 4/10 before massage, and 1/10 after. No follow up is planned. MAC Milner

## 2019-11-07 NOTE — H&P
[facility] Inpatient Hematology / Oncology History and Physical 
 
Reason for Admission:  Admission for antineoplastic chemotherapy [Z51.11] History of Present Illness: Ms. Ramin Corona is a 68 y.o. female admitted on 11/7/2019. She is a known patient of Dr. 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 She failed induction with 7+3/Midostaurin. On Dacogen/Gilteritinib with recent BMbx with persistent residual disease. She is being admitted for FLAG-VENITA. She is feeling well and is ready to proceed with treatment. Review of Systems: 
Constitutional Denies fever, chills, weight loss, appetite changes, fatigue, night sweats. HEENT Denies trauma, blurry vision, hearing loss, ear pain, nosebleeds, sore throat, neck pain and ear discharge. Skin Denies lesions or rashes. Lungs Denies dyspnea, cough, sputum production or hemoptysis. Cardiovascular Denies chest pain, palpitations, or lower extremity edema. Gastrointestinal Denies nausea, vomiting, changes in bowel habits, bloody or black stools, abdominal pain.  Denies dysuria, frequency or hesitancy of urination. Neuro Denies headaches, visual changes or ataxia. Denies dizziness, tingling, tremors, sensory change, speech change, focal weakness or headaches. Hematology Denies easy bruising or bleeding, denies gingival bleeding or epistaxis. Endo Denies heat/cold intolerance, denies diabetes or thyroid abnormalities. MSK Denies back pain, arthralgias, myalgias or frequent falls. Psychiatric/Behavioral Denies depression and substance abuse. The patient is not nervous/anxious. No Known Allergies Past Medical History:  
Diagnosis Date  AML (acute myeloid leukemia) ([facility] [address] dx- 4/2019  
 followed by dr Morena Samuel  Depression  Hypercholesterolemia  Infection   
 of port -- was placed 5/2019-- removed 6/2019---right chest  
 Psychiatric disorder   
 aniexty  Sleep apnea Past Surgical History:  
Procedure Laterality Date  HX OTHER SURGICAL    
 colonoscopy  HX VASCULAR ACCESS    
 IR INSERT TUNL CVC W PORT OVER 5 YEARS  4/30/2019  IR INSERT TUNL CVC W PORT OVER 5 YEARS  7/15/2019  IR REMOVE TUNL CVAD W PORT/PUMP  6/13/2019 Family History Problem Relation Age of Onset  Cancer Father Social History Socioeconomic History  Marital status:  Spouse name: Not on file  Number of children: Not on file  Years of education: Not on file  Highest education level: Not on file Occupational History  Not on file Social Needs  Financial resource strain: Not on file  Food insecurity:  
  Worry: Not on file Inability: Not on file  Transportation needs:  
  Medical: Not on file Non-medical: Not on file Tobacco Use  Smoking status: Never Smoker  Smokeless tobacco: Never Used Substance and Sexual Activity  Alcohol use: Never Frequency: Never  Drug use: Never  Sexual activity: Not on file Lifestyle  Physical activity:  
  Days per week: Not on file Minutes per session: Not on file  Stress: Not on file Relationships  Social connections:  
  Talks on phone: Not on file Gets together: Not on file Attends Restoration service: Not on file Active member of club or organization: Not on file Attends meetings of clubs or organizations: Not on file Relationship status: Not on file  Intimate partner violence:  
  Fear of current or ex partner: Not on file Emotionally abused: Not on file Physically abused: Not on file Forced sexual activity: Not on file Other Topics Concern [address] Service Not Asked  Blood Transfusions Not Asked  Caffeine Concern Not Asked  Occupational Exposure Not Asked [place] Levels Hazards Not Asked  Sleep Concern Not Asked  Stress Concern Not Asked  Weight Concern Not Asked  Special Diet Not Asked  Back Care Not Asked  Exercise Not Asked  Bike Helmet Not Asked  Seat Belt Not Asked  Self-Exams Not Asked Social History Narrative  Not on file Current Facility-Administered Medications Medication Dose Route Frequency Provider Last Rate Last Dose  acetaminophen/diphenhydrAMINE (TYLENOL PM EXT STR) 500/25 mg   Oral QHS Casey Aguirre NP      
 acyclovir (ZOVIRAX) tablet 400 mg  400 mg Oral BID Casey Aguirre NP      
 [START ON 11/8/2019] allopurinol (ZYLOPRIM) tablet 300 mg  300 mg Oral DAILY Casey Aguirre NP      
 [START ON 11/8/2019] cholecalciferol (VITAMIN D3) (400 Units /10 mcg) tablet 2 Tab  800 Units Oral DAILY Casey Aguirre NP      
 [START ON 11/8/2019] DULoxetine (CYMBALTA) capsule 30 mg  30 mg Oral DAILY Casey Aguirre NP      
 lidocaine-prilocaine (EMLA) 2.5-2.5 % cream   Topical PRN Casey Aguirre NP      
 LORazepam (ATIVAN) tablet 1 mg  1 mg Oral QHS Casey Aguirre NP      
 pravastatin (PRAVACHOL) tablet 10 mg  10 mg Oral QHS Cooksonjorge luis Aguirre NP      
 voriconazole (VFEND) tablet 200 mg  200 mg Oral Q12H Casey Aguirre NP      
 0.9% sodium chloride infusion  75 mL/hr IntraVENous CONTINUOUS Casey Aguirre NP      
 ondansetron [facility]) injection 4 mg  4 mg IntraVENous Q4H PRN Casey Aguirre NP      
 prochlorperazine (COMPAZINE) with saline injection 5 mg  5 mg IntraVENous Q6H PRN Casey Aguirre NP      
 HYDROcodone-acetaminophen (NORCO) 5-325 mg per tablet 1 Tab  1 Tab Oral Q6H PRN Casey Aguirre NP      
 morphine injection 2 mg  2 mg IntraVENous Q4H PRN Casey Aguirre NP      
 acetaminophen (TYLENOL) tablet 650 mg  650 mg Oral Q6H PRN Andrea Zuluaga MD      
 diphenhydrAMINE (BENADRYL) capsule 25 mg  25 mg Oral Q6H PRN Andrea Zuluaga MD      
 
Facility-Administered Medications Ordered in Other Encounters Medication Dose Route Frequency Provider Last Rate Last Dose  
 0.9% sodium chloride infusion 250 mL  250 mL IntraVENous PRN Andrea Zuluaga MD   Stopped at 11/06/19 3408  central line flush (saline) syringe 10 mL  10 mL InterCATHeter PRN Sarah Crow MD   10 mL at 19 1530 OBJECTIVE: 
Patient Vitals for the past 8 hrs: 
 BP Temp Pulse Resp SpO2 Height Weight 19 1121 126/61 99.7 °F (37.6 °C) 86 18 95 %    
19 0957 162/64 99.8 °F (37.7 °C) 92 18 99 % 5' 6\" (1.676 m) 193 lb 1.6 oz (87.6 kg) Temp (24hrs), Av.4 °F (37.4 °C), Min:98.6 °F (37 °C), Max:99.9 °F (37.7 °C) No intake/output data recorded. Physical Exam: 
Constitutional: Well developed, well nourished female in no acute distress, sitting comfortably in the hospital bed.  at bedside. HEENT: Normocephalic and atraumatic. Oropharynx is clear, mucous membranes are moist. Extraocular muscles are intact. Sclerae anicteric. Neck supple without JVD. No thyromegaly present. Skin Warm and dry. No bruising and no rash noted. No erythema. No pallor. Respiratory Lungs are clear to auscultation bilaterally without wheezes, rales or rhonchi, normal air exchange without accessory muscle use. CVS Normal rate, regular rhythm and normal S1 and S2. No murmurs, gallops, or rubs. Abdomen Soft, nontender and nondistended, normoactive bowel sounds. No palpable mass. No hepatosplenomegaly. Neuro Grossly nonfocal with no obvious sensory or motor deficits. MSK Normal range of motion in general.  No edema and no tenderness. Psych Appropriate mood and affect. Labs:   
Recent Results (from the past 24 hour(s)) CBC WITH AUTOMATED DIFF Collection Time: 19 10:35 AM  
Result Value Ref Range WBC 0.3 (LL) 4.3 - 11.1 K/uL  
 RBC 2.26 (L) 4.05 - 5.2 M/uL HGB 6.7 (LL) 11.7 - 15.4 g/dL HCT 19.7 (LL) 35.8 - 46.3 % MCV 87.2 79.6 - 97.8 FL  
 MCH 29.6 26.1 - 32.9 PG  
 MCHC 34.0 31.4 - 35.0 g/dL  
 RDW 13.4 11.9 - 14.6 % PLATELET 28 (LL) 792 - 450 K/uL MPV 10.9 9.4 - 12.3 FL  ABSOLUTE NRBC 0.00 0.0 - 0.2 K/uL  
 RBC COMMENTS SLIGHT 
ANISOCYTOSIS 
    
 RBC COMMENTS SLIGHT 
HYPOCHROMIA 
    
 RBC COMMENTS OCCASIONAL 
POLYCHROMASIA 
    
 WBC COMMENTS WBC TOO FEW TO DIFFERENTIATE PLATELET COMMENTS MARKED    
 DF MANUAL METABOLIC PANEL, COMPREHENSIVE Collection Time: 11/07/19 10:35 AM  
Result Value Ref Range Sodium 142 136 - 145 mmol/L Potassium 4.2 3.5 - 5.1 mmol/L Chloride 109 (H) 98 - 107 mmol/L  
 CO2 27 21 - 32 mmol/L Anion gap 6 (L) 7 - 16 mmol/L Glucose 84 65 - 100 mg/dL BUN 22 8 - 23 MG/DL Creatinine 0.85 0.6 - 1.0 MG/DL  
 GFR est AA >60 >60 ml/min/1.73m2 GFR est non-AA >60 >60 ml/min/1.73m2 Calcium 8.4 8.3 - 10.4 MG/DL Bilirubin, total 0.2 0.2 - 1.1 MG/DL  
 ALT (SGPT) 23 12 - 65 U/L  
 AST (SGOT) 22 15 - 37 U/L Alk. phosphatase 85 50 - 136 U/L Protein, total 6.0 (L) 6.3 - 8.2 g/dL Albumin 2.5 (L) 3.2 - 4.6 g/dL Globulin 3.5 2.3 - 3.5 g/dL A-G Ratio 0.7 (L) 1.2 - 3.5 MAGNESIUM Collection Time: 11/07/19 10:35 AM  
Result Value Ref Range Magnesium 2.0 1.8 - 2.4 mg/dL Imaging: 
n/a ASSESSMENT: 
Problem List  Date Reviewed: 10/31/2019 Codes Class Noted Febrile neutropenia (HCC) ICD-10-CM: D70.9, R50.81 ICD-9-CM: 288.00, 780.61  9/21/2019 Pancytopenia due to antineoplastic chemotherapy [facility]) ICD-10-CM: D61.810, T45.1X5A 
ICD-9-CM: 284.11, E933.1  6/12/2019 Cellulitis of neck ICD-10-CM: K26.191 ICD-9-CM: 682.1  6/12/2019 Immunocompromised status associated with infection ICD-10-CM: B99.9 ICD-9-CM: 136.9  6/12/2019 Port or reservoir infection ICD-10-CM: K23.442T ICD-9-CM: 999.33  6/12/2019 Acute myeloid leukemia not having achieved remission ([facility] ICD-10-CM: C92.00 ICD-9-CM: 205.00  5/9/2019 Admission for antineoplastic chemotherapy ICD-10-CM: Z51.11 ICD-9-CM: V58.11  5/5/2019 AML (acute myeloblastic leukemia) ([facility] ICD-10-CM: C92.00 ICD-9-CM: 205.00  4/28/2019 Weakness generalized ICD-10-CM: R53.1 ICD-9-CM: 780.79  4/28/2019 Pancytopenia ([facility] [address] ICD-10-CM: M17.703 ICD-9-CM: 284.19  4/28/2019 Thrombocytopenia ([facility] [address] ICD-10-CM: D69.6 ICD-9-CM: 287.5  4/27/2019 PLAN: 
AML 
- admit for salvage FLAG-VENITA 
- needs Echo prior - ordered Pancytopenia secondary to disease - Transfuse prn per [facility] SOPs 
- Transfuse 1 unit PBRCs Continue home meds Prophylactic Antibx: Acyclovir, Voriconazole, Levaquin [facility] SOPs SCDs for DVT prophylaxis (AC contraindicated d/t thrombocytopenia) Disposition:  Will need to stay during count recovery after completion of chemotherapy. Expect hospitalization for several weeks. Upon discharge will need twice weekly labs w transfusions as needed. Weekly provider visits. RTC within 1 week from discharge. Calos Quiñonez NP [facility] [address] [place] [address] Avenue Office : [phone] Fax : [phone]

## 2019-11-07 NOTE — PROGRESS NOTES
Pt is known to the floor and is being admitted for Chemo treatment. Chart screened by  for discharge planning. No needs identified at this time. Please consult  if any new issues arise CM will continue to follow Care Management Interventions PCP Verified by CM: Yes Mode of Transport at Discharge: Self Transition of Care Consult (CM Consult): Discharge Planning Discharge Durable Medical Equipment: No 
Physical Therapy Consult: No 
Occupational Therapy Consult: No 
Speech Therapy Consult: No 
Current Support Network: Own Home, Family Lives [place] Confirm Follow Up Transport: Family Plan discussed with Pt/Family/Caregiver: Yes Freedom of Choice Offered: Yes [employer] Information Provided?: No 
Discharge Location Discharge Placement: Unable to determine at this time

## 2019-11-07 NOTE — PROGRESS NOTES
11/07/19 1135 Dual Skin Pressure Injury Assessment Dual Skin Pressure Injury Assessment [facility] Second Care Provider (Based on [address] [place] [address]) Justina

## 2019-11-08 LAB
ABO + RH BLD: NORMAL
ALBUMIN SERPL-MCNC: 2.3 G/DL (ref 3.2–4.6)
ALBUMIN/GLOB SERPL: 0.6 {RATIO} (ref 1.2–3.5)
ALP SERPL-CCNC: 84 U/L (ref 50–136)
ALT SERPL-CCNC: 23 U/L (ref 12–65)
ANION GAP SERPL CALC-SCNC: 5 MMOL/L (ref 7–16)
APTT PPP: 37.8 SEC (ref 24.7–39.8)
AST SERPL-CCNC: 20 U/L (ref 15–37)
BILIRUB SERPL-MCNC: 0.3 MG/DL (ref 0.2–1.1)
BLD PROD TYP BPU: NORMAL
BLD PROD TYP BPU: NORMAL
BLOOD GROUP ANTIBODIES SERPL: NORMAL
BPU ID: NORMAL
BPU ID: NORMAL
BUN SERPL-MCNC: 16 MG/DL (ref 8–23)
CALCIUM SERPL-MCNC: 8.2 MG/DL (ref 8.3–10.4)
CHLORIDE SERPL-SCNC: 109 MMOL/L (ref 98–107)
CO2 SERPL-SCNC: 28 MMOL/L (ref 21–32)
CREAT SERPL-MCNC: 0.74 MG/DL (ref 0.6–1)
CROSSMATCH RESULT,%XM: NORMAL
CROSSMATCH RESULT,%XM: NORMAL
DIFFERENTIAL METHOD BLD: ABNORMAL
ERYTHROCYTE [DISTWIDTH] IN BLOOD BY AUTOMATED COUNT: 13.3 % (ref 11.9–14.6)
FIBRINOGEN PPP-MCNC: 437 MG/DL (ref 190–501)
GLOBULIN SER CALC-MCNC: 3.7 G/DL (ref 2.3–3.5)
GLUCOSE SERPL-MCNC: 94 MG/DL (ref 65–100)
HCT VFR BLD AUTO: 22 % (ref 35.8–46.3)
HGB BLD-MCNC: 7.5 G/DL (ref 11.7–15.4)
INR PPP: 1.2
LDH SERPL L TO P-CCNC: 371 U/L (ref 110–210)
MAGNESIUM SERPL-MCNC: 1.7 MG/DL (ref 1.8–2.4)
MCH RBC QN AUTO: 30.1 PG (ref 26.1–32.9)
MCHC RBC AUTO-ENTMCNC: 34.1 G/DL (ref 31.4–35)
MCV RBC AUTO: 88.4 FL (ref 79.6–97.8)
NRBC # BLD: 0 K/UL (ref 0–0.2)
PHOSPHATE SERPL-MCNC: 2.7 MG/DL (ref 2.3–3.7)
PLATELET # BLD AUTO: 19 K/UL (ref 150–450)
PLATELET COMMENTS,PCOM: ABNORMAL
PMV BLD AUTO: 10.5 FL (ref 9.4–12.3)
POTASSIUM SERPL-SCNC: 4 MMOL/L (ref 3.5–5.1)
PROT SERPL-MCNC: 6 G/DL (ref 6.3–8.2)
PROTHROMBIN TIME: 15.3 SEC (ref 11.7–14.5)
RBC # BLD AUTO: 2.49 M/UL (ref 4.05–5.2)
RBC MORPH BLD: ABNORMAL
SODIUM SERPL-SCNC: 142 MMOL/L (ref 136–145)
SPECIMEN EXP DATE BLD: NORMAL
STATUS OF UNIT,%ST: NORMAL
STATUS OF UNIT,%ST: NORMAL
UNIT DIVISION, %UDIV: 0
UNIT DIVISION, %UDIV: 0
URATE SERPL-MCNC: 3.7 MG/DL (ref 2.6–6)
WBC # BLD AUTO: 0.4 K/UL (ref 4.3–11.1)
WBC MORPH BLD: ABNORMAL

## 2019-11-08 PROCEDURE — 65270000015 HC RM PRIVATE ONCOLOGY

## 2019-11-08 PROCEDURE — 74011250636 HC RX REV CODE- 250/636: Performed by: INTERNAL MEDICINE

## 2019-11-08 PROCEDURE — 3E04305 INTRODUCTION OF OTHER ANTINEOPLASTIC INTO CENTRAL VEIN, PERCUTANEOUS APPROACH: ICD-10-PCS | Performed by: INTERNAL MEDICINE

## 2019-11-08 PROCEDURE — 84100 ASSAY OF PHOSPHORUS: CPT

## 2019-11-08 PROCEDURE — 74011250637 HC RX REV CODE- 250/637: Performed by: INTERNAL MEDICINE

## 2019-11-08 PROCEDURE — 85384 FIBRINOGEN ACTIVITY: CPT

## 2019-11-08 PROCEDURE — 83735 ASSAY OF MAGNESIUM: CPT

## 2019-11-08 PROCEDURE — 80053 COMPREHEN METABOLIC PANEL: CPT

## 2019-11-08 PROCEDURE — 74011000250 HC RX REV CODE- 250: Performed by: INTERNAL MEDICINE

## 2019-11-08 PROCEDURE — 85025 COMPLETE CBC W/AUTO DIFF WBC: CPT

## 2019-11-08 PROCEDURE — 74011250636 HC RX REV CODE- 250/636: Performed by: NURSE PRACTITIONER

## 2019-11-08 PROCEDURE — 85730 THROMBOPLASTIN TIME PARTIAL: CPT

## 2019-11-08 PROCEDURE — 74011000258 HC RX REV CODE- 258: Performed by: INTERNAL MEDICINE

## 2019-11-08 PROCEDURE — 36591 DRAW BLOOD OFF VENOUS DEVICE: CPT

## 2019-11-08 PROCEDURE — 83615 LACTATE (LD) (LDH) ENZYME: CPT

## 2019-11-08 PROCEDURE — 65270000029 HC RM PRIVATE

## 2019-11-08 PROCEDURE — 99232 SBSQ HOSP IP/OBS MODERATE 35: CPT | Performed by: INTERNAL MEDICINE

## 2019-11-08 PROCEDURE — 85610 PROTHROMBIN TIME: CPT

## 2019-11-08 PROCEDURE — 74011250637 HC RX REV CODE- 250/637: Performed by: NURSE PRACTITIONER

## 2019-11-08 PROCEDURE — 84550 ASSAY OF BLOOD/URIC ACID: CPT

## 2019-11-08 PROCEDURE — 77030020263 HC SOL INJ SOD CL0.9% LFCR 1000ML

## 2019-11-08 RX ORDER — PREDNISOLONE ACETATE 10 MG/ML
2 SUSPENSION/ DROPS OPHTHALMIC EVERY 6 HOURS
Status: COMPLETED | OUTPATIENT
Start: 2019-11-08 | End: 2019-11-18

## 2019-11-08 RX ORDER — ONDANSETRON 2 MG/ML
8 INJECTION INTRAMUSCULAR; INTRAVENOUS AS NEEDED
Status: CANCELLED | OUTPATIENT
Start: 2019-11-08

## 2019-11-08 RX ORDER — HYDROCORTISONE SODIUM SUCCINATE 100 MG/2ML
100 INJECTION, POWDER, FOR SOLUTION INTRAMUSCULAR; INTRAVENOUS AS NEEDED
Status: CANCELLED | OUTPATIENT
Start: 2019-11-08

## 2019-11-08 RX ORDER — LORAZEPAM 2 MG/ML
0.5 INJECTION INTRAMUSCULAR
Status: DISCONTINUED | OUTPATIENT
Start: 2019-11-08 | End: 2019-12-12 | Stop reason: HOSPADM

## 2019-11-08 RX ORDER — DIPHENHYDRAMINE HYDROCHLORIDE 50 MG/ML
50 INJECTION, SOLUTION INTRAMUSCULAR; INTRAVENOUS AS NEEDED
Status: CANCELLED
Start: 2019-11-08

## 2019-11-08 RX ORDER — EPINEPHRINE 1 MG/ML
0.3 INJECTION, SOLUTION, CONCENTRATE INTRAVENOUS AS NEEDED
Status: CANCELLED | OUTPATIENT
Start: 2019-11-08

## 2019-11-08 RX ORDER — ACETAMINOPHEN 325 MG/1
650 TABLET ORAL AS NEEDED
Status: CANCELLED
Start: 2019-11-08

## 2019-11-08 RX ORDER — ONDANSETRON 2 MG/ML
8 INJECTION INTRAMUSCULAR; INTRAVENOUS EVERY 8 HOURS
Status: COMPLETED | OUTPATIENT
Start: 2019-11-08 | End: 2019-11-09

## 2019-11-08 RX ORDER — LANOLIN ALCOHOL/MO/W.PET/CERES
400 CREAM (GRAM) TOPICAL 2 TIMES DAILY
Status: DISCONTINUED | OUTPATIENT
Start: 2019-11-08 | End: 2019-11-18

## 2019-11-08 RX ORDER — SODIUM CHLORIDE 0.9 % (FLUSH) 0.9 %
10 SYRINGE (ML) INJECTION AS NEEDED
Status: DISCONTINUED | OUTPATIENT
Start: 2019-11-08 | End: 2019-12-12 | Stop reason: HOSPADM

## 2019-11-08 RX ORDER — HEPARIN 100 UNIT/ML
300-500 SYRINGE INTRAVENOUS AS NEEDED
Status: DISCONTINUED | OUTPATIENT
Start: 2019-11-08 | End: 2019-12-12 | Stop reason: HOSPADM

## 2019-11-08 RX ORDER — DEXAMETHASONE SODIUM PHOSPHATE 10 MG/ML
10 INJECTION INTRAMUSCULAR; INTRAVENOUS EVERY 24 HOURS
Status: DISCONTINUED | OUTPATIENT
Start: 2019-11-08 | End: 2019-11-18 | Stop reason: SDUPTHER

## 2019-11-08 RX ORDER — ALBUTEROL SULFATE 0.83 MG/ML
2.5 SOLUTION RESPIRATORY (INHALATION) AS NEEDED
Status: CANCELLED
Start: 2019-11-08

## 2019-11-08 RX ADMIN — VORICONAZOLE 200 MG: 200 TABLET, FILM COATED ORAL at 08:43

## 2019-11-08 RX ADMIN — PRAVASTATIN SODIUM 10 MG: 20 TABLET ORAL at 21:26

## 2019-11-08 RX ADMIN — LORAZEPAM 1 MG: 1 TABLET ORAL at 21:26

## 2019-11-08 RX ADMIN — SODIUM CHLORIDE 75 ML/HR: 900 INJECTION, SOLUTION INTRAVENOUS at 13:00

## 2019-11-08 RX ADMIN — DEXTROSE MONOHYDRATE 20 MG: 5 INJECTION, SOLUTION INTRAVENOUS at 14:49

## 2019-11-08 RX ADMIN — ONDANSETRON 8 MG: 2 INJECTION INTRAMUSCULAR; INTRAVENOUS at 19:45

## 2019-11-08 RX ADMIN — ALLOPURINOL 300 MG: 300 TABLET ORAL at 08:44

## 2019-11-08 RX ADMIN — Medication 400 MG: at 18:02

## 2019-11-08 RX ADMIN — FLUDARABINE PHOSPHATE 61 MG: 25 INJECTION, SOLUTION INTRAVENOUS at 15:21

## 2019-11-08 RX ADMIN — CYTARABINE 4040 MG: 2 INJECTION INTRATHECAL; INTRAVENOUS; SUBCUTANEOUS at 20:32

## 2019-11-08 RX ADMIN — PREDNISOLONE ACETATE 2 DROP: 10 SUSPENSION/ DROPS OPHTHALMIC at 18:02

## 2019-11-08 RX ADMIN — Medication 2 TABLET: at 08:54

## 2019-11-08 RX ADMIN — ACYCLOVIR 400 MG: 800 TABLET ORAL at 08:44

## 2019-11-08 RX ADMIN — DIPHENHYDRAMINE HYDROCHLORIDE 25 MG: 25 CAPSULE ORAL at 21:27

## 2019-11-08 RX ADMIN — PREDNISOLONE ACETATE 2 DROP: 10 SUSPENSION/ DROPS OPHTHALMIC at 13:39

## 2019-11-08 RX ADMIN — Medication 400 MG: at 08:44

## 2019-11-08 RX ADMIN — LEVOFLOXACIN 500 MG: 500 TABLET, FILM COATED ORAL at 13:40

## 2019-11-08 RX ADMIN — ONDANSETRON 8 MG: 2 INJECTION INTRAMUSCULAR; INTRAVENOUS at 13:41

## 2019-11-08 RX ADMIN — VORICONAZOLE 200 MG: 200 TABLET, FILM COATED ORAL at 21:26

## 2019-11-08 RX ADMIN — DEXAMETHASONE SODIUM PHOSPHATE 10 MG: 10 INJECTION, SOLUTION INTRAMUSCULAR; INTRAVENOUS at 13:42

## 2019-11-08 RX ADMIN — DULOXETINE 30 MG: 30 CAPSULE, DELAYED RELEASE ORAL at 08:44

## 2019-11-08 RX ADMIN — SODIUM CHLORIDE 75 ML/HR: 900 INJECTION, SOLUTION INTRAVENOUS at 01:31

## 2019-11-08 RX ADMIN — ACYCLOVIR 400 MG: 800 TABLET ORAL at 18:02

## 2019-11-08 NOTE — CDMP QUERY
Patient admitted for chemotherapy for AML and has documented \"pancytopenia secondary to disease\". If possible, please document in progress notes and discharge summary further specificity regarding pancytopenia: 
 
 
? Pancytopenia due to AML ? Pancytopenia due to antineoplastic therapy (chemotherapy) ? Pancytopenia due to antineoplastic therapy (chemotherapy) and AML ? Other cause (please specify) ? Clinically unable to determine ? Unknown The medical record reflects the following: 
 
  Risk Factors: AML, Chemotherapy Clinical Indicators: WBC 0.3, RBC 2.17, [address] Treatment: [facility] SOP's, Daily CBC Thanks, Jasmina Garcia, RN, BSN, CDS Clinical Documentation Improvement 
[phone]

## 2019-11-08 NOTE — PROGRESS NOTES
Initial visit by  to convey care and concern and encourage patient that  services are available if desired. Mrs. Mynor Acuña and her spouse are known to me from prior admissions. I offered support, including prayer as requested. Chaplains remain available for support. Anuj Washington MDiv Board Certified [employer]

## 2019-11-08 NOTE — PROGRESS NOTES
END OF SHIFT NOTE: 
 
Intake/Output 11/07 1901 - 11/08 0700 In: 393 [I.V.:627] Out: 2100 [Urine:2100] Voiding: YES Catheter: NO 
Drain:   
 
 
 
 
 
Stool:  0 occurrences. Emesis:  0 occurrences. VITAL SIGNS Patient Vitals for the past 12 hrs: 
 Temp Pulse Resp BP SpO2  
11/08/19 0318 98.8 °F (37.1 °C) 81 18 124/65 93 % 11/07/19 1933 99.3 °F (37.4 °C) 88 18 125/55 94 % 11/07/19 1925 99.1 °F (37.3 °C) 85 18 135/61 94 % Pain Assessment Pain 1 Pain Scale 1: Numeric (0 - 10) (11/07/19 1933) Pain Intensity 1: 0 (11/07/19 1933) Patient Stated Pain Goal: 0 (11/07/19 1933) Ambulating Yes Additional Information: patient had a good night. VSS. Afebrile. Patient to start FLAG VENITA today. Patient still needing new chemo education and consent to be signed. Shift report to be given to oncoming nurse at the bedside.  
 
Debra Kelley RN

## 2019-11-08 NOTE — PROGRESS NOTES
[employer] [facility] Inpatient Hematology / Oncology Daily Progress Note Reason for Admission:  Admission for antineoplastic chemotherapy [Z51.11] 24 Hour Events: 
Afebrile, VSS Day 1 FLAG-VENITA  at bedside ROS: 
Constitutional: Negative for fever, chills, weakness, malaise, fatigue. CV: Negative for chest pain, palpitations, edema. Respiratory: Negative for dyspnea, cough, wheezing. GI: Negative for nausea, abdominal pain, diarrhea. 10 point review of systems is otherwise negative with the exception of the elements mentioned above in the HPI. No Known Allergies Past Medical History:  
Diagnosis Date  AML (acute myeloid leukemia) ([facility] [address] dx- 4/2019  
 followed by dr Kristie Ford  Depression  Hypercholesterolemia  Infection   
 of port -- was placed 5/2019-- removed 6/2019---right chest  
 Psychiatric disorder   
 aniexty  Sleep apnea Past Surgical History:  
Procedure Laterality Date  HX OTHER SURGICAL    
 colonoscopy  HX VASCULAR ACCESS    
 IR INSERT TUNL CVC W PORT OVER 5 YEARS  4/30/2019  IR INSERT TUNL CVC W PORT OVER 5 YEARS  7/15/2019  IR REMOVE TUNL CVAD W PORT/PUMP  6/13/2019 Family History Problem Relation Age of Onset  Cancer Father Social History Socioeconomic History  Marital status:  Spouse name: Not on file  Number of children: Not on file  Years of education: Not on file  Highest education level: Not on file Occupational History  Not on file Social Needs  Financial resource strain: Not on file  Food insecurity:  
  Worry: Not on file Inability: Not on file  Transportation needs:  
  Medical: Not on file Non-medical: Not on file Tobacco Use  Smoking status: Never Smoker  Smokeless tobacco: Never Used Substance and Sexual Activity  Alcohol use: Never Frequency: Never  Drug use: Never  Sexual activity: Not on file Lifestyle  Physical activity:  
  Days per week: Not on file Minutes per session: Not on file  Stress: Not on file Relationships  Social connections:  
  Talks on phone: Not on file Gets together: Not on file Attends Latter day service: Not on file Active member of club or organization: Not on file Attends meetings of clubs or organizations: Not on file Relationship status: Not on file  Intimate partner violence:  
  Fear of current or ex partner: Not on file Emotionally abused: Not on file Physically abused: Not on file Forced sexual activity: Not on file Other Topics Concern [address] Service Not Asked  Blood Transfusions Not Asked  Caffeine Concern Not Asked  Occupational Exposure Not Asked [place] Levels Hazards Not Asked  Sleep Concern Not Asked  Stress Concern Not Asked  Weight Concern Not Asked  Special Diet Not Asked  Back Care Not Asked  Exercise Not Asked  Bike Helmet Not Asked  Seat Belt Not Asked  Self-Exams Not Asked Social History Narrative  Not on file Current Facility-Administered Medications Medication Dose Route Frequency Provider Last Rate Last Dose  magnesium oxide (MAG-OX) tablet 400 mg  400 mg Oral BID Dianne Phans, NP   400 mg at 11/08/19 9025  acyclovir (ZOVIRAX) tablet 400 mg  400 mg Oral BID Dianne Phans, NP   400 mg at 11/08/19 7237  allopurinol (ZYLOPRIM) tablet 300 mg  300 mg Oral DAILY Dianne Marines, NP   300 mg at 11/08/19 5266  cholecalciferol (VITAMIN D3) (400 Units /10 mcg) tablet 2 Tab  800 Units Oral DAILY Dianne Phans, NP   2 Tab at 11/08/19 4236  DULoxetine (CYMBALTA) capsule 30 mg  30 mg Oral DAILY Dianne Phans, NP   30 mg at 11/08/19 2940  lidocaine-prilocaine (EMLA) 2.5-2.5 % cream   Topical PRN Dianne Harris, NP      
 LORazepam (ATIVAN) tablet 1 mg  1 mg Oral QHS Dianne Phans, NP   1 mg at 11/07/19 2138  pravastatin (PRAVACHOL) tablet 10 mg  10 mg Oral QHS Dianne Harris, NP 10 mg at 19 2147  voriconazole (VFEND) tablet 200 mg  200 mg Oral Q12H Bria , NP   200 mg at 19 0843  
 0.9% sodium chloride infusion  75 mL/hr IntraVENous CONTINUOUS Bria Carisa, NP 75 mL/hr at 19 0131 75 mL/hr at 19 0131  
 ondansetron (ZOFRAN) injection 4 mg  4 mg IntraVENous Q4H PRN Bria Carisa, NP      
 prochlorperazine (COMPAZINE) with saline injection 5 mg  5 mg IntraVENous Q6H PRN Bria Carisa, NP      
 HYDROcodone-acetaminophen [facility]) 5-325 mg per tablet 1 Tab  1 Tab Oral Q6H PRN Bria Carisa, NP      
 morphine injection 2 mg  2 mg IntraVENous Q4H PRN Bria , NP      
 acetaminophen (TYLENOL) tablet 650 mg  650 mg Oral Q6H PRN Dora Horner MD   650 mg at 19 1441  diphenhydrAMINE (BENADRYL) capsule 25 mg  25 mg Oral Q6H PRN Dora Horner MD   25 mg at 19 2147  
 vitamin a-vitamin c-vit e-min (OCUVITE) tablet 1 Tab (Patient Supplied)  1 Tab Oral DAILY Dora Horner MD      
 levoFLOXacin [facility]) tablet 500 mg  500 mg Oral Q24H Bria Younger, NP   500 mg at 19 1425  acetaminophen/diphenhydrAMINE (TYLENOL PM EXT STR) 500/25 mg (Patient Supplied)   Oral QHS Dora Horner MD      
 vit C,D-Nt-asnqz-lutein-zeaxan (PRESERVISION AREDS-2) capsule 1 Cap (Patient Supplied)  [employer] [employer] Dora Horner MD   1 Cap at 19 1266 Facility-Administered Medications Ordered in Other Encounters Medication Dose Route Frequency Provider Last Rate Last Dose  
 0.9% sodium chloride infusion 250 mL  250 mL IntraVENous PRN Dora Horner MD   Stopped at 19 1530  
 central line flush (saline) syringe 10 mL  10 mL InterCATHeter PRN Dora Horner MD   10 mL at 19 1530 OBJECTIVE: 
Patient Vitals for the past 8 hrs: 
 BP Temp Pulse Resp SpO2  
19 0644 154/62 100.1 °F (37.8 °C) 85 18 96 % 19 0318 124/65 98.8 °F (37.1 °C) 81 18 93 % Temp (24hrs), Av.3 °F (37.4 °C), Min:98.8 °F (37.1 °C), Max:100.1 °F (37.8 °C) 11/08 0701 - 11/08 1900 In: 500 [P.O.:500] Out: - Physical Exam: 
Constitutional: Well developed, well nourished female in no acute distress, sitting comfortably in the hospital bed. HEENT: Normocephalic and atraumatic. Oropharynx is clear, mucous membranes are moist. Extraocular muscles are intact. Sclerae anicteric. Neck supple without JVD. No thyromegaly present. Skin Warm and dry. No bruising and no rash noted. No erythema. No pallor. Respiratory Lungs are clear to auscultation bilaterally without wheezes, rales or rhonchi, normal air exchange without accessory muscle use. CVS Normal rate, regular rhythm and normal S1 and S2. No murmurs, gallops, or rubs. Abdomen Soft, nontender and nondistended, normoactive bowel sounds. No palpable mass. No hepatosplenomegaly. Neuro Grossly nonfocal with no obvious sensory or motor deficits. MSK Normal range of motion in general.  No edema and no tenderness. Psych Appropriate mood and affect. Labs:   
Recent Results (from the past 24 hour(s)) CBC WITH AUTOMATED DIFF Collection Time: 11/07/19 10:35 AM  
Result Value Ref Range WBC 0.3 (LL) 4.3 - 11.1 K/uL  
 RBC 2.26 (L) 4.05 - 5.2 M/uL HGB 6.7 (LL) 11.7 - 15.4 g/dL HCT 19.7 (LL) 35.8 - 46.3 % MCV 87.2 79.6 - 97.8 FL  
 MCH 29.6 26.1 - 32.9 PG  
 MCHC 34.0 31.4 - 35.0 g/dL  
 RDW 13.4 11.9 - 14.6 % PLATELET 28 (LL) 730 - 450 K/uL MPV 10.9 9.4 - 12.3 FL ABSOLUTE NRBC 0.00 0.0 - 0.2 K/uL  
 RBC COMMENTS SLIGHT 
ANISOCYTOSIS 
    
 RBC COMMENTS SLIGHT 
HYPOCHROMIA 
    
 RBC COMMENTS OCCASIONAL 
POLYCHROMASIA 
    
 WBC COMMENTS WBC TOO FEW TO DIFFERENTIATE PLATELET COMMENTS MARKED    
 DF MANUAL METABOLIC PANEL, COMPREHENSIVE Collection Time: 11/07/19 10:35 AM  
Result Value Ref Range Sodium 142 136 - 145 mmol/L Potassium 4.2 3.5 - 5.1 mmol/L Chloride 109 (H) 98 - 107 mmol/L  
 CO2 27 21 - 32 mmol/L  Anion gap 6 (L) 7 - 16 mmol/L  
 Glucose 84 65 - 100 mg/dL BUN 22 8 - 23 MG/DL Creatinine 0.85 0.6 - 1.0 MG/DL  
 GFR est AA >60 >60 ml/min/1.73m2 GFR est non-AA >60 >60 ml/min/1.73m2 Calcium 8.4 8.3 - 10.4 MG/DL Bilirubin, total 0.2 0.2 - 1.1 MG/DL  
 ALT (SGPT) 23 12 - 65 U/L  
 AST (SGOT) 22 15 - 37 U/L Alk. phosphatase 85 50 - 136 U/L Protein, total 6.0 (L) 6.3 - 8.2 g/dL Albumin 2.5 (L) 3.2 - 4.6 g/dL Globulin 3.5 2.3 - 3.5 g/dL A-G Ratio 0.7 (L) 1.2 - 3.5 MAGNESIUM Collection Time: 11/07/19 10:35 AM  
Result Value Ref Range Magnesium 2.0 1.8 - 2.4 mg/dL METABOLIC PANEL, COMPREHENSIVE Collection Time: 11/08/19  4:09 AM  
Result Value Ref Range Sodium 142 136 - 145 mmol/L Potassium 4.0 3.5 - 5.1 mmol/L Chloride 109 (H) 98 - 107 mmol/L  
 CO2 28 21 - 32 mmol/L Anion gap 5 (L) 7 - 16 mmol/L Glucose 94 65 - 100 mg/dL BUN 16 8 - 23 MG/DL Creatinine 0.74 0.6 - 1.0 MG/DL  
 GFR est AA >60 >60 ml/min/1.73m2 GFR est non-AA >60 >60 ml/min/1.73m2 Calcium 8.2 (L) 8.3 - 10.4 MG/DL Bilirubin, total 0.3 0.2 - 1.1 MG/DL  
 ALT (SGPT) 23 12 - 65 U/L  
 AST (SGOT) 20 15 - 37 U/L Alk. phosphatase 84 50 - 136 U/L Protein, total 6.0 (L) 6.3 - 8.2 g/dL Albumin 2.3 (L) 3.2 - 4.6 g/dL Globulin 3.7 (H) 2.3 - 3.5 g/dL A-G Ratio 0.6 (L) 1.2 - 3.5 MAGNESIUM Collection Time: 11/08/19  4:09 AM  
Result Value Ref Range Magnesium 1.7 (L) 1.8 - 2.4 mg/dL LD Collection Time: 11/08/19  4:09 AM  
Result Value Ref Range  (H) 110 - 210 U/L  
URIC ACID Collection Time: 11/08/19  4:09 AM  
Result Value Ref Range Uric acid 3.7 2.6 - 6.0 MG/DL  
PHOSPHORUS Collection Time: 11/08/19  4:09 AM  
Result Value Ref Range Phosphorus 2.7 2.3 - 3.7 MG/DL PROTHROMBIN TIME + INR Collection Time: 11/08/19  4:09 AM  
Result Value Ref Range Prothrombin time 15.3 (H) 11.7 - 14.5 sec INR 1.2 PTT Collection Time: 11/08/19  4:09 AM  
Result Value Ref Range aPTT 37.8 24.7 - 39.8 SEC FIBRINOGEN Collection Time: 11/08/19  4:09 AM  
Result Value Ref Range Fibrinogen 437 190 - 501 mg/dL CBC WITH AUTOMATED DIFF Collection Time: 11/08/19  4:09 AM  
Result Value Ref Range WBC 0.4 (LL) 4.3 - 11.1 K/uL  
 RBC 2.49 (L) 4.05 - 5.2 M/uL HGB 7.5 (L) 11.7 - 15.4 g/dL HCT 22.0 (L) 35.8 - 46.3 % MCV 88.4 79.6 - 97.8 FL  
 MCH 30.1 26.1 - 32.9 PG  
 MCHC 34.1 31.4 - 35.0 g/dL  
 RDW 13.3 11.9 - 14.6 % PLATELET 19 (LL) 769 - 450 K/uL MPV 10.5 9.4 - 12.3 FL ABSOLUTE NRBC 0.00 0.0 - 0.2 K/uL  
 RBC COMMENTS NORMOCYTIC/NORMOCHROMIC    
 WBC COMMENTS WBC TOO FEW TO DIFFERENTIATE PLATELET COMMENTS MARKED    
 DF MANUAL Imaging: 
n/a ASSESSMENT: 
Problem List  Date Reviewed: 10/31/2019 Codes Class Noted Febrile neutropenia (HCC) ICD-10-CM: D70.9, R50.81 ICD-9-CM: 288.00, 780.61  9/21/2019 Pancytopenia due to antineoplastic chemotherapy [facility]) ICD-10-CM: D61.810, T45.1X5A 
ICD-9-CM: 284.11, E933.1  6/12/2019 Cellulitis of neck ICD-10-CM: Z37.812 ICD-9-CM: 682.1  6/12/2019 Immunocompromised status associated with infection ICD-10-CM: B99.9 ICD-9-CM: 136.9  6/12/2019 Port or reservoir infection ICD-10-CM: I33.004P ICD-9-CM: 999.33  6/12/2019 Acute myeloid leukemia not having achieved remission ([facility] [address] ICD-10-CM: C92.00 ICD-9-CM: 205.00  5/9/2019 Admission for antineoplastic chemotherapy ICD-10-CM: Z51.11 ICD-9-CM: V58.11  5/5/2019 AML (acute myeloblastic leukemia) ([facility] ICD-10-CM: C92.00 ICD-9-CM: 205.00  4/28/2019 Weakness generalized ICD-10-CM: R53.1 ICD-9-CM: 780.79  4/28/2019 Pancytopenia ([facility] ICD-10-CM: S35.923 ICD-9-CM: 284.19  4/28/2019 Thrombocytopenia ([facility] ICD-10-CM: D69.6 ICD-9-CM: 287.5  4/27/2019 Ms. Zenia Jensen is a 68 y.o. female admitted on 11/7/2019.   She is a known patient of Dr. 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 She failed induction with 7+3/Midostaurin. On Dacogen/Gilteritinib with recent BMbx with persistent residual disease. She is being admitted for FLAG-VENITA. She is feeling well and is ready to proceed with treatment. PLAN: 
AML 
- admit for salvage FLAG-VENITA 
- needs Echo prior - ordered 11/8 Day 1 FLAG-VENITA. Echo with EF 55-60%, proceed with tx. Pancytopenia secondary to disease - Transfuse prn per [facility] SOPs Continue home meds Prophylactic Antibx: Acyclovir, Voriconazole, Levaquin [facility] SOPs SCDs for DVT prophylaxis (AC contraindicated d/t thrombocytopenia) Disposition:  Will need to stay during count recovery after completion of chemotherapy. Expect hospitalization for several weeks. Upon discharge will need twice weekly labs w transfusions as needed. Weekly provider visits. RTC within 1 week from discharge. Satish Lincoln NP [employer] [facility] H[facility]ology & Oncology [address] [place] [address] Office : [phone] Fax : [phone]

## 2019-11-09 LAB
ALBUMIN SERPL-MCNC: 2.4 G/DL (ref 3.2–4.6)
ALBUMIN/GLOB SERPL: 0.7 {RATIO} (ref 1.2–3.5)
ALP SERPL-CCNC: 84 U/L (ref 50–136)
ALT SERPL-CCNC: 21 U/L (ref 12–65)
ANION GAP SERPL CALC-SCNC: 5 MMOL/L (ref 7–16)
APTT PPP: 50.5 SEC (ref 24.7–39.8)
AST SERPL-CCNC: 18 U/L (ref 15–37)
BILIRUB SERPL-MCNC: 0.3 MG/DL (ref 0.2–1.1)
BUN SERPL-MCNC: 19 MG/DL (ref 8–23)
CALCIUM SERPL-MCNC: 7.9 MG/DL (ref 8.3–10.4)
CHLORIDE SERPL-SCNC: 113 MMOL/L (ref 98–107)
CO2 SERPL-SCNC: 26 MMOL/L (ref 21–32)
CREAT SERPL-MCNC: 0.57 MG/DL (ref 0.6–1)
DIFFERENTIAL METHOD BLD: ABNORMAL
ERYTHROCYTE [DISTWIDTH] IN BLOOD BY AUTOMATED COUNT: 13.2 % (ref 11.9–14.6)
FIBRINOGEN PPP-MCNC: 169 MG/DL (ref 190–501)
GLOBULIN SER CALC-MCNC: 3.5 G/DL (ref 2.3–3.5)
GLUCOSE SERPL-MCNC: 144 MG/DL (ref 65–100)
HCT VFR BLD AUTO: 21.4 % (ref 35.8–46.3)
HGB BLD-MCNC: 7.4 G/DL (ref 11.7–15.4)
INR PPP: 1.5
LDH SERPL L TO P-CCNC: 389 U/L (ref 110–210)
MAGNESIUM SERPL-MCNC: 2 MG/DL (ref 1.8–2.4)
MCH RBC QN AUTO: 30.6 PG (ref 26.1–32.9)
MCHC RBC AUTO-ENTMCNC: 34.6 G/DL (ref 31.4–35)
MCV RBC AUTO: 88.4 FL (ref 79.6–97.8)
NRBC # BLD: 0 K/UL (ref 0–0.2)
PHOSPHATE SERPL-MCNC: 2 MG/DL (ref 2.3–3.7)
PLATELET # BLD AUTO: 14 K/UL (ref 150–450)
PLATELET COMMENTS,PCOM: ABNORMAL
PMV BLD AUTO: 10.9 FL (ref 9.4–12.3)
POTASSIUM SERPL-SCNC: 4.3 MMOL/L (ref 3.5–5.1)
PROT SERPL-MCNC: 5.9 G/DL (ref 6.3–8.2)
PROTHROMBIN TIME: 18.8 SEC (ref 11.7–14.5)
RBC # BLD AUTO: 2.42 M/UL (ref 4.05–5.2)
RBC MORPH BLD: ABNORMAL
SODIUM SERPL-SCNC: 144 MMOL/L (ref 136–145)
URATE SERPL-MCNC: 3.1 MG/DL (ref 2.6–6)
WBC # BLD AUTO: 0.2 K/UL (ref 4.3–11.1)
WBC MORPH BLD: ABNORMAL

## 2019-11-09 PROCEDURE — 85730 THROMBOPLASTIN TIME PARTIAL: CPT

## 2019-11-09 PROCEDURE — 77030020263 HC SOL INJ SOD CL0.9% LFCR 1000ML

## 2019-11-09 PROCEDURE — 85025 COMPLETE CBC W/AUTO DIFF WBC: CPT

## 2019-11-09 PROCEDURE — 65270000029 HC RM PRIVATE

## 2019-11-09 PROCEDURE — 74011250637 HC RX REV CODE- 250/637: Performed by: INTERNAL MEDICINE

## 2019-11-09 PROCEDURE — 74011250636 HC RX REV CODE- 250/636: Performed by: INTERNAL MEDICINE

## 2019-11-09 PROCEDURE — 84550 ASSAY OF BLOOD/URIC ACID: CPT

## 2019-11-09 PROCEDURE — 85610 PROTHROMBIN TIME: CPT

## 2019-11-09 PROCEDURE — 85384 FIBRINOGEN ACTIVITY: CPT

## 2019-11-09 PROCEDURE — 65270000015 HC RM PRIVATE ONCOLOGY

## 2019-11-09 PROCEDURE — 83735 ASSAY OF MAGNESIUM: CPT

## 2019-11-09 PROCEDURE — 83615 LACTATE (LD) (LDH) ENZYME: CPT

## 2019-11-09 PROCEDURE — 74011250636 HC RX REV CODE- 250/636: Performed by: NURSE PRACTITIONER

## 2019-11-09 PROCEDURE — 99232 SBSQ HOSP IP/OBS MODERATE 35: CPT | Performed by: INTERNAL MEDICINE

## 2019-11-09 PROCEDURE — 80053 COMPREHEN METABOLIC PANEL: CPT

## 2019-11-09 PROCEDURE — 74011000258 HC RX REV CODE- 258: Performed by: INTERNAL MEDICINE

## 2019-11-09 PROCEDURE — 74011250637 HC RX REV CODE- 250/637: Performed by: NURSE PRACTITIONER

## 2019-11-09 PROCEDURE — 84100 ASSAY OF PHOSPHORUS: CPT

## 2019-11-09 RX ORDER — SODIUM,POTASSIUM PHOSPHATES 280-250MG
1 POWDER IN PACKET (EA) ORAL 3 TIMES DAILY
Status: DISCONTINUED | OUTPATIENT
Start: 2019-11-09 | End: 2019-11-18

## 2019-11-09 RX ADMIN — PREDNISOLONE ACETATE 2 DROP: 10 SUSPENSION/ DROPS OPHTHALMIC at 11:53

## 2019-11-09 RX ADMIN — PREDNISOLONE ACETATE 2 DROP: 10 SUSPENSION/ DROPS OPHTHALMIC at 06:11

## 2019-11-09 RX ADMIN — VORICONAZOLE 200 MG: 200 TABLET, FILM COATED ORAL at 21:31

## 2019-11-09 RX ADMIN — POTASSIUM & SODIUM PHOSPHATES POWDER PACK 280-160-250 MG 1 PACKET: 280-160-250 PACK at 21:31

## 2019-11-09 RX ADMIN — CYTARABINE 4040 MG: 2 INJECTION INTRATHECAL; INTRAVENOUS; SUBCUTANEOUS at 18:33

## 2019-11-09 RX ADMIN — ACYCLOVIR 400 MG: 800 TABLET ORAL at 17:35

## 2019-11-09 RX ADMIN — LORAZEPAM 1 MG: 1 TABLET ORAL at 21:31

## 2019-11-09 RX ADMIN — Medication 400 MG: at 09:32

## 2019-11-09 RX ADMIN — POTASSIUM & SODIUM PHOSPHATES POWDER PACK 280-160-250 MG 1 PACKET: 280-160-250 PACK at 16:00

## 2019-11-09 RX ADMIN — ALLOPURINOL 300 MG: 300 TABLET ORAL at 09:31

## 2019-11-09 RX ADMIN — FLUDARABINE PHOSPHATE 61 MG: 25 INJECTION, SOLUTION INTRAVENOUS at 14:48

## 2019-11-09 RX ADMIN — PRAVASTATIN SODIUM 10 MG: 20 TABLET ORAL at 21:31

## 2019-11-09 RX ADMIN — ACYCLOVIR 400 MG: 800 TABLET ORAL at 09:32

## 2019-11-09 RX ADMIN — POTASSIUM & SODIUM PHOSPHATES POWDER PACK 280-160-250 MG 1 PACKET: 280-160-250 PACK at 09:32

## 2019-11-09 RX ADMIN — PREDNISOLONE ACETATE 2 DROP: 10 SUSPENSION/ DROPS OPHTHALMIC at 23:35

## 2019-11-09 RX ADMIN — VORICONAZOLE 200 MG: 200 TABLET, FILM COATED ORAL at 09:32

## 2019-11-09 RX ADMIN — SODIUM CHLORIDE 75 ML/HR: 900 INJECTION, SOLUTION INTRAVENOUS at 03:13

## 2019-11-09 RX ADMIN — LEVOFLOXACIN 500 MG: 500 TABLET, FILM COATED ORAL at 14:02

## 2019-11-09 RX ADMIN — DEXAMETHASONE SODIUM PHOSPHATE 10 MG: 10 INJECTION, SOLUTION INTRAMUSCULAR; INTRAVENOUS at 11:53

## 2019-11-09 RX ADMIN — Medication 400 MG: at 17:35

## 2019-11-09 RX ADMIN — DEXTROSE MONOHYDRATE 20 MG: 5 INJECTION, SOLUTION INTRAVENOUS at 14:08

## 2019-11-09 RX ADMIN — PREDNISOLONE ACETATE 2 DROP: 10 SUSPENSION/ DROPS OPHTHALMIC at 00:20

## 2019-11-09 RX ADMIN — ONDANSETRON 8 MG: 2 INJECTION INTRAMUSCULAR; INTRAVENOUS at 03:14

## 2019-11-09 RX ADMIN — DULOXETINE 30 MG: 30 CAPSULE, DELAYED RELEASE ORAL at 09:31

## 2019-11-09 RX ADMIN — DIPHENHYDRAMINE HYDROCHLORIDE 25 MG: 25 CAPSULE ORAL at 21:31

## 2019-11-09 RX ADMIN — Medication 2 TABLET: at 09:32

## 2019-11-09 RX ADMIN — PREDNISOLONE ACETATE 2 DROP: 10 SUSPENSION/ DROPS OPHTHALMIC at 17:37

## 2019-11-09 NOTE — PROGRESS NOTES
Pt up in the chair alert and oriented x 4, resp even and unlabored pt voices no complains of any at the present time, will continue to monitor.

## 2019-11-09 NOTE — PROGRESS NOTES
Problem: Chemotherapy, Day 1 Goal: Activity/Safety Outcome: Progressing Towards Goal 
Goal: Consults, if ordered Outcome: Progressing Towards Goal 
Goal: Diagnostic Test/Procedures Outcome: Progressing Towards Goal 
Goal: Nutrition/Diet Outcome: Progressing Towards Goal 
Goal: Discharge Planning Outcome: Progressing Towards Goal 
Goal: Medications Outcome: Progressing Towards Goal 
Goal: Respiratory Outcome: Progressing Towards Goal 
Goal: Treatments/Interventions/Procedures Outcome: Progressing Towards Goal 
Goal: Psychosocial 
Outcome: Progressing Towards Goal 
Goal: *Optimal pain control at patient's stated goal 
Outcome: Progressing Towards Goal 
Goal: *Hemodynamically stable Outcome: Progressing Towards Goal 
Goal: *Adequate oxygenation Outcome: Progressing Towards Goal 
Goal: *Chemotherapy regimen is initiated Outcome: Progressing Towards Goal 
Goal: *Patient and family verbalize understanding of plan of care Outcome: Progressing Towards Goal 
  
Problem: Falls - Risk of 
Goal: *Absence of Falls Description Document Alexsander Vargas Fall Risk and appropriate interventions in the flowsheet. Outcome: Progressing Towards Goal 
Note:  
Fall Risk Interventions: 
Mobility Interventions: Communicate number of staff needed for ambulation/transfer, Patient to call before getting OOB Medication Interventions: Assess postural VS orthostatic hypotension, Patient to call before getting OOB, Teach patient to arise slowly Elimination Interventions: Call light in reach, Patient to call for help with toileting needs History of Falls Interventions: Room close to nurse's station Problem: Patient Education: Go to Patient Education Activity Goal: Patient/Family Education Outcome: Progressing Towards Goal

## 2019-11-09 NOTE — PROGRESS NOTES
[facility] Inpatient Hematology / Oncology Daily Progress Note Reason for Admission:  Admission for antineoplastic chemotherapy [Z51.11] 24 Hour Events: 
Afebrile, VSS Day 2  FLAG-VENITA  at bedside Walking the halls No issues or complaints ROS: 
Constitutional: Negative for fever, chills, weakness, malaise, fatigue. CV: Negative for chest pain, palpitations, edema. Respiratory: Negative for dyspnea, cough, wheezing. GI: Negative for nausea, abdominal pain, diarrhea. 10 point review of systems is otherwise negative with the exception of the elements mentioned above in the HPI. No Known Allergies Past Medical History:  
Diagnosis Date  AML (acute myeloid leukemia) ([facility] [address] dx- 4/2019  
 followed by dr Luis Antonio Johnson  Depression  Hypercholesterolemia  Infection   
 of port -- was placed 5/2019-- removed 6/2019---right chest  
 Psychiatric disorder   
 aniexty  Sleep apnea Past Surgical History:  
Procedure Laterality Date  HX OTHER SURGICAL    
 colonoscopy  HX VASCULAR ACCESS    
 IR INSERT TUNL CVC W PORT OVER 5 YEARS  4/30/2019  IR INSERT TUNL CVC W PORT OVER 5 YEARS  7/15/2019  IR REMOVE TUNL CVAD W PORT/PUMP  6/13/2019 Family History Problem Relation Age of Onset  Cancer Father Social History Socioeconomic History  Marital status:  Spouse name: Not on file  Number of children: Not on file  Years of education: Not on file  Highest education level: Not on file Occupational History  Not on file Social Needs  Financial resource strain: Not on file  Food insecurity:  
  Worry: Not on file Inability: Not on file  Transportation needs:  
  Medical: Not on file Non-medical: Not on file Tobacco Use  Smoking status: Never Smoker  Smokeless tobacco: Never Used Substance and Sexual Activity  Alcohol use: Never Frequency: Never  Drug use: Never  Sexual activity: Not on file Lifestyle  Physical activity:  
  Days per week: Not on file Minutes per session: Not on file  Stress: Not on file Relationships  Social connections:  
  Talks on phone: Not on file Gets together: Not on file Attends Yarsani service: Not on file Active member of club or organization: Not on file Attends meetings of clubs or organizations: Not on file Relationship status: Not on file  Intimate partner violence:  
  Fear of current or ex partner: Not on file Emotionally abused: Not on file Physically abused: Not on file Forced sexual activity: Not on file Other Topics Concern [address] Service Not Asked  Blood Transfusions Not Asked  Caffeine Concern Not Asked  Occupational Exposure Not Asked Rosalene Srikanth Hazards Not Asked  Sleep Concern Not Asked  Stress Concern Not Asked  Weight Concern Not Asked  Special Diet Not Asked  Back Care Not Asked  Exercise Not Asked  Bike Helmet Not Asked  Seat Belt Not Asked  Self-Exams Not Asked Social History Narrative  Not on file Current Facility-Administered Medications Medication Dose Route Frequency Provider Last Rate Last Dose  potassium, sodium phosphates (NEUTRA-PHOS) packet 1 Packet  1 Packet Oral TID Colleen Gifford MD   1 Packet at 11/09/19 0932  
 magnesium oxide (MAG-OX) tablet 400 mg  400 mg Oral BID Radha Hernandez NP   400 mg at 11/09/19 5156  dexamethasone (PF) (DECADRON) injection 10 mg  10 mg IntraVENous Q24H Colleen Gifford MD   10 mg at 11/09/19 1153  LORazepam (ATIVAN) injection 0.5 mg  0.5 mg IntraVENous Q6H PRN Colleen Gifford MD      
 prednisoLONE acetate (PRED FORTE) 1 % ophthalmic suspension 2 Drop  2 Drop Both Eyes Q6H Colleen Gifford MD   2 Drop at 11/09/19 1153  fludarabine (FLUDARA) 61 mg in 0.9% sodium chloride 100 mL chemo infusion  30 mg/m2 (Treatment Plan Recorded) IntraVENous Q24H Jesica Bear Barrow  mL/hr at 11/08/19 1521 61 mg at 11/08/19 1521  cytarabine (CYTOSAR) 4,040 mg in 0.9% sodium chloride 500 mL chemo infusion  2 g/m2 (Treatment Plan Recorded) IntraVENous Q24H Willy Alston MD   4,040 mg at 11/08/19 2032  IDArubicin (IDAMYCIN) 20 mg in dextrose 5% 50 mL chemo infusion  10 mg/m2 (Treatment Plan Recorded) IntraVENous Q24H Willy Alston  mL/hr at 11/08/19 1449 20 mg at 11/08/19 1449  saline peripheral flush soln 10 mL  10 mL InterCATHeter PRN Willy Alston MD      
 heparin (porcine) pf 300-500 Units  300-500 Units InterCATHeter PRN Willy Alston MD      
[facility] ON 11/13/2019] tbo-filgrastim (GRANIX) injection 480 mcg  480 mcg SubCUTAneous QHS Willy Alston MD      
 acyclovir (ZOVIRAX) tablet 400 mg  400 mg Oral BID [place] Iha, NP   400 mg at 11/09/19 8951  allopurinol (ZYLOPRIM) tablet 300 mg  300 mg Oral DAILY [place] Iha, NP   300 mg at 11/09/19 5793  cholecalciferol (VITAMIN D3) (400 Units /10 mcg) tablet 2 Tab  800 Units Oral DAILY [place] Iha, NP   2 Tab at 11/09/19 8460  DULoxetine (CYMBALTA) capsule 30 mg  30 mg Oral DAILY [place] Iha, NP   30 mg at 11/09/19 0220  lidocaine-prilocaine (EMLA) 2.5-2.5 % cream   Topical PRN [place] Iha, NP      
 LORazepam (ATIVAN) tablet 1 mg  1 mg Oral QHS [place] Iha, NP   1 mg at 11/08/19 2126  
 pravastatin (PRAVACHOL) tablet 10 mg  10 mg Oral QHS [place] Iha, NP   10 mg at 11/08/19 2126  voriconazole (VFEND) tablet 200 mg  200 mg Oral Q12H [place] Iha, NP   200 mg at 11/09/19 0932  
 0.9% sodium chloride infusion  75 mL/hr IntraVENous CONTINUOUS [place] Iha, NP 75 mL/hr at 11/09/19 0313 75 mL/hr at 11/09/19 8185  ondansetron (ZOFRAN) injection 4 mg  4 mg IntraVENous Q4H PRN [place] Iha, NP      
 prochlorperazine (COMPAZINE) with saline injection 5 mg  5 mg IntraVENous Q6H PRN [place] Iha, NP      
 HYDROcodone-acetaminophen (NORCO) 5-325 mg per tablet 1 Tab  1 Tab Oral Q6H PRN [place] Iha, NP      
  morphine injection 2 mg  2 mg IntraVENous Q4H PRN Ede Russell NP      
 acetaminophen (TYLENOL) tablet 650 mg  650 mg Oral Q6H PRN Domniique Ochoa MD   650 mg at 19 1441  diphenhydrAMINE (BENADRYL) capsule 25 mg  25 mg Oral Q6H PRN Dominique Ochoa MD   25 mg at 19 2127  
 vitamin a-vitamin c-vit e-min (OCUVITE) tablet 1 Tab (Patient Supplied)  1 Tab Oral DAILY Dominique Ochoa MD      
 levoFLOXacin [facility]) tablet 500 mg  500 mg Oral Q24H Ede Russell NP   500 mg at 19 1340  acetaminophen/diphenhydrAMINE (TYLENOL PM EXT STR) 500/25 mg (Patient Supplied)   Oral QHS Dominique Ochoa MD      
 vit C,D-Cc-lsmcw-lutein-zeaxan (PRESERVISION AREDS-2) capsule 1 Cap (Patient Supplied)  [address] Dominique Ochoa MD   1 Cap at 19 7197 Facility-Administered Medications Ordered in Other Encounters Medication Dose Route Frequency Provider Last Rate Last Dose  
 0.9% sodium chloride infusion 250 mL  250 mL IntraVENous PRN Dominique Ochoa MD   Stopped at 19 1530  
 central line flush (saline) syringe 10 mL  10 mL InterCATHeter PRN Dominique Ochoa MD   10 mL at 19 1530 OBJECTIVE: 
Patient Vitals for the past 8 hrs: 
 BP Temp Pulse Resp SpO2  
19 0805 115/56 98.4 °F (36.9 °C) 89 18 98 % Temp (24hrs), Av.5 °F (36.9 °C), Min:97.6 °F (36.4 °C), Max:99.3 °F (37.4 °C)  0701 -  1900 In: 360 [P.O.:360] Out: 1000 [Urine:1000] Physical Exam: 
Constitutional: Well developed, well nourished female in no acute distress, sitting comfortably in the hospital bed. HEENT: Normocephalic and atraumatic. Oropharynx is clear, mucous membranes are moist. Extraocular muscles are intact. Sclerae anicteric. Neck supple without JVD. No thyromegaly present. Skin Warm and dry. No bruising and no rash noted. No erythema. No pallor.    
Respiratory Lungs are clear to auscultation bilaterally without wheezes, rales or rhonchi, normal air exchange without accessory muscle use. CVS Normal rate, regular rhythm and normal S1 and S2. No murmurs, gallops, or rubs. Abdomen Soft, nontender and nondistended, normoactive bowel sounds. No palpable mass. No hepatosplenomegaly. Neuro Grossly nonfocal with no obvious sensory or motor deficits. MSK Normal range of motion in general.  No edema and no tenderness. Psych Appropriate mood and affect. Labs:   
Recent Results (from the past 24 hour(s)) METABOLIC PANEL, COMPREHENSIVE Collection Time: 11/09/19  3:18 AM  
Result Value Ref Range Sodium 144 136 - 145 mmol/L Potassium 4.3 3.5 - 5.1 mmol/L Chloride 113 (H) 98 - 107 mmol/L  
 CO2 26 21 - 32 mmol/L Anion gap 5 (L) 7 - 16 mmol/L Glucose 144 (H) 65 - 100 mg/dL BUN 19 8 - 23 MG/DL Creatinine 0.57 (L) 0.6 - 1.0 MG/DL  
 GFR est AA >60 >60 ml/min/1.73m2 GFR est non-AA >60 >60 ml/min/1.73m2 Calcium 7.9 (L) 8.3 - 10.4 MG/DL Bilirubin, total 0.3 0.2 - 1.1 MG/DL  
 ALT (SGPT) 21 12 - 65 U/L  
 AST (SGOT) 18 15 - 37 U/L Alk. phosphatase 84 50 - 136 U/L Protein, total 5.9 (L) 6.3 - 8.2 g/dL Albumin 2.4 (L) 3.2 - 4.6 g/dL Globulin 3.5 2.3 - 3.5 g/dL A-G Ratio 0.7 (L) 1.2 - 3.5 MAGNESIUM Collection Time: 11/09/19  3:18 AM  
Result Value Ref Range Magnesium 2.0 1.8 - 2.4 mg/dL LD Collection Time: 11/09/19  3:18 AM  
Result Value Ref Range  (H) 110 - 210 U/L  
URIC ACID Collection Time: 11/09/19  3:18 AM  
Result Value Ref Range Uric acid 3.1 2.6 - 6.0 MG/DL  
PHOSPHORUS Collection Time: 11/09/19  3:18 AM  
Result Value Ref Range Phosphorus 2.0 (L) 2.3 - 3.7 MG/DL PROTHROMBIN TIME + INR Collection Time: 11/09/19  3:18 AM  
Result Value Ref Range Prothrombin time 18.8 (H) 11.7 - 14.5 sec INR 1.5 PTT Collection Time: 11/09/19  3:18 AM  
Result Value Ref Range aPTT 50.5 (H) 24.7 - 39.8 SEC FIBRINOGEN  
 Collection Time: 11/09/19  3:18 AM  
Result Value Ref Range Fibrinogen 169 (L) 190 - 501 mg/dL CBC WITH AUTOMATED DIFF Collection Time: 11/09/19  3:18 AM  
Result Value Ref Range WBC 0.2 (LL) 4.3 - 11.1 K/uL  
 RBC 2.42 (L) 4.05 - 5.2 M/uL HGB 7.4 (L) 11.7 - 15.4 g/dL HCT 21.4 (L) 35.8 - 46.3 % MCV 88.4 79.6 - 97.8 FL  
 MCH 30.6 26.1 - 32.9 PG  
 MCHC 34.6 31.4 - 35.0 g/dL  
 RDW 13.2 11.9 - 14.6 % PLATELET 14 (LL) 135 - 450 K/uL MPV 10.9 9.4 - 12.3 FL ABSOLUTE NRBC 0.00 0.0 - 0.2 K/uL  
 RBC COMMENTS SLIGHT 
ANISOCYTOSIS + POIKILOCYTOSIS 
    
 WBC COMMENTS ATYPICAL LYMPHOCYTES PRESENT    
 PLATELET COMMENTS MARKED    
 DF AUTOMATED Imaging: 
n/a ASSESSMENT: 
Problem List  Date Reviewed: 10/31/2019 Codes Class Noted Febrile neutropenia (HCC) ICD-10-CM: D70.9, R50.81 ICD-9-CM: 288.00, 780.61  9/21/2019 Pancytopenia due to antineoplastic chemotherapy [facility]) ICD-10-CM: D61.810, T45.1X5A 
ICD-9-CM: 284.11, E933.1  6/12/2019 Cellulitis of neck ICD-10-CM: U91.285 ICD-9-CM: 682.1  6/12/2019 Immunocompromised status associated with infection ICD-10-CM: B99.9 ICD-9-CM: 136.9  6/12/2019 Port or reservoir infection ICD-10-CM: F26.557I ICD-9-CM: 999.33  6/12/2019 Acute myeloid leukemia not having achieved remission ([facility] ICD-10-CM: C92.00 ICD-9-CM: 205.00  5/9/2019 Admission for antineoplastic chemotherapy ICD-10-CM: Z51.11 ICD-9-CM: V58.11  5/5/2019 AML (acute myeloblastic leukemia) ([address] ICD-10-CM: C92.00 ICD-9-CM: 205.00  4/28/2019 Weakness generalized ICD-10-CM: R53.1 ICD-9-CM: 780.79  4/28/2019 Pancytopenia ([facility] ICD-10-CM: X79.644 ICD-9-CM: 284.19  4/28/2019 Thrombocytopenia ([facility] ICD-10-CM: D69.6 ICD-9-CM: 287.5  4/27/2019 Ms. Jefry Ambrocio is a 68 y.o. female admitted on 11/7/2019. She is a known patient of Dr. 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 She failed induction with 7+3/Midostaurin. On Dacogen/Gilteritinib with recent BMbx with persistent residual disease. She is being admitted for FLAG-VENITA. She is feeling well and is ready to proceed with treatment. PLAN: 
AML 
- admit for salvage FLAG-VENITA 
- needs Echo prior - ordered 11/8 Day 1 FLAG-VENITA. Echo with EF 55-60%, proceed with tx. 
11/9 Day 2 FLAG-VENITA. Tolerating well, no issues. Uric acid 3.1 today. Pancytopenia secondary to disease - Transfuse prn per [facility] SOPs Continue home meds Prophylactic Antibx: Acyclovir, Voriconazole, Levaquin [facility] SOPs SCDs for DVT prophylaxis (AC contraindicated d/t thrombocytopenia) Disposition:  Will need to stay during count recovery after completion of chemotherapy. Expect hospitalization for several weeks. Upon discharge will need twice weekly labs w transfusions as needed. Weekly provider visits. RTC within 1 week from discharge. Epifania Gaucher, NP [facility] Oncology [id number] [place] [address] Office : [phone] Fax : [phone]

## 2019-11-10 ENCOUNTER — APPOINTMENT (OUTPATIENT)
Dept: GENERAL RADIOLOGY | Age: 74
DRG: 837 | End: 2019-11-10
Attending: INTERNAL MEDICINE
Payer: MEDICARE

## 2019-11-10 LAB
ALBUMIN SERPL-MCNC: 2.5 G/DL (ref 3.2–4.6)
ALBUMIN/GLOB SERPL: 0.7 {RATIO} (ref 1.2–3.5)
ALP SERPL-CCNC: 80 U/L (ref 50–136)
ALT SERPL-CCNC: 22 U/L (ref 12–65)
ANION GAP SERPL CALC-SCNC: 4 MMOL/L (ref 7–16)
APTT PPP: 39.5 SEC (ref 24.7–39.8)
AST SERPL-CCNC: 21 U/L (ref 15–37)
BILIRUB SERPL-MCNC: 0.3 MG/DL (ref 0.2–1.1)
BUN SERPL-MCNC: 22 MG/DL (ref 8–23)
CALCIUM SERPL-MCNC: 7.8 MG/DL (ref 8.3–10.4)
CHLORIDE SERPL-SCNC: 111 MMOL/L (ref 98–107)
CO2 SERPL-SCNC: 28 MMOL/L (ref 21–32)
CREAT SERPL-MCNC: 0.68 MG/DL (ref 0.6–1)
DIFFERENTIAL METHOD BLD: ABNORMAL
ERYTHROCYTE [DISTWIDTH] IN BLOOD BY AUTOMATED COUNT: 13.2 % (ref 11.9–14.6)
FIBRINOGEN PPP-MCNC: 78 MG/DL (ref 190–501)
GLOBULIN SER CALC-MCNC: 3.4 G/DL (ref 2.3–3.5)
GLUCOSE SERPL-MCNC: 147 MG/DL (ref 65–100)
HCT VFR BLD AUTO: 20.2 % (ref 35.8–46.3)
HGB BLD-MCNC: 6.8 G/DL (ref 11.7–15.4)
INR PPP: 1.7
LDH SERPL L TO P-CCNC: 389 U/L (ref 110–210)
MAGNESIUM SERPL-MCNC: 2.2 MG/DL (ref 1.8–2.4)
MCH RBC QN AUTO: 29.4 PG (ref 26.1–32.9)
MCHC RBC AUTO-ENTMCNC: 33.7 G/DL (ref 31.4–35)
MCV RBC AUTO: 87.4 FL (ref 79.6–97.8)
NRBC # BLD: 0 K/UL (ref 0–0.2)
PHOSPHATE SERPL-MCNC: 2.5 MG/DL (ref 2.3–3.7)
PLATELET # BLD AUTO: 8 K/UL (ref 150–450)
PLATELET COMMENTS,PCOM: ABNORMAL
PMV BLD AUTO: 10.1 FL (ref 9.4–12.3)
POTASSIUM SERPL-SCNC: 4.3 MMOL/L (ref 3.5–5.1)
PROT SERPL-MCNC: 5.9 G/DL (ref 6.3–8.2)
PROTHROMBIN TIME: 20.5 SEC (ref 11.7–14.5)
RBC # BLD AUTO: 2.31 M/UL (ref 4.05–5.2)
RBC MORPH BLD: ABNORMAL
SODIUM SERPL-SCNC: 143 MMOL/L (ref 136–145)
URATE SERPL-MCNC: 3.3 MG/DL (ref 2.6–6)
WBC # BLD AUTO: 0.1 K/UL (ref 4.3–11.1)
WBC MORPH BLD: ABNORMAL

## 2019-11-10 PROCEDURE — 85610 PROTHROMBIN TIME: CPT

## 2019-11-10 PROCEDURE — 80053 COMPREHEN METABOLIC PANEL: CPT

## 2019-11-10 PROCEDURE — 77030020256 HC SOL INJ NACL 0.9%  500ML

## 2019-11-10 PROCEDURE — 84550 ASSAY OF BLOOD/URIC ACID: CPT

## 2019-11-10 PROCEDURE — 84100 ASSAY OF PHOSPHORUS: CPT

## 2019-11-10 PROCEDURE — 74011250636 HC RX REV CODE- 250/636: Performed by: NURSE PRACTITIONER

## 2019-11-10 PROCEDURE — 36430 TRANSFUSION BLD/BLD COMPNT: CPT

## 2019-11-10 PROCEDURE — P9040 RBC LEUKOREDUCED IRRADIATED: HCPCS

## 2019-11-10 PROCEDURE — 77030020263 HC SOL INJ SOD CL0.9% LFCR 1000ML

## 2019-11-10 PROCEDURE — 85384 FIBRINOGEN ACTIVITY: CPT

## 2019-11-10 PROCEDURE — 86923 COMPATIBILITY TEST ELECTRIC: CPT

## 2019-11-10 PROCEDURE — 74011250636 HC RX REV CODE- 250/636: Performed by: INTERNAL MEDICINE

## 2019-11-10 PROCEDURE — 86900 BLOOD TYPING SEROLOGIC ABO: CPT

## 2019-11-10 PROCEDURE — 83615 LACTATE (LD) (LDH) ENZYME: CPT

## 2019-11-10 PROCEDURE — 74011000258 HC RX REV CODE- 258: Performed by: INTERNAL MEDICINE

## 2019-11-10 PROCEDURE — 99232 SBSQ HOSP IP/OBS MODERATE 35: CPT | Performed by: INTERNAL MEDICINE

## 2019-11-10 PROCEDURE — 30233R1 TRANSFUSION OF NONAUTOLOGOUS PLATELETS INTO PERIPHERAL VEIN, PERCUTANEOUS APPROACH: ICD-10-PCS | Performed by: NURSE PRACTITIONER

## 2019-11-10 PROCEDURE — 73080 X-RAY EXAM OF ELBOW: CPT

## 2019-11-10 PROCEDURE — 65270000015 HC RM PRIVATE ONCOLOGY

## 2019-11-10 PROCEDURE — 65270000029 HC RM PRIVATE

## 2019-11-10 PROCEDURE — 86644 CMV ANTIBODY: CPT

## 2019-11-10 PROCEDURE — P9037 PLATE PHERES LEUKOREDU IRRAD: HCPCS

## 2019-11-10 PROCEDURE — 85730 THROMBOPLASTIN TIME PARTIAL: CPT

## 2019-11-10 PROCEDURE — 85025 COMPLETE CBC W/AUTO DIFF WBC: CPT

## 2019-11-10 PROCEDURE — 83735 ASSAY OF MAGNESIUM: CPT

## 2019-11-10 PROCEDURE — 74011250637 HC RX REV CODE- 250/637: Performed by: NURSE PRACTITIONER

## 2019-11-10 PROCEDURE — 74011250637 HC RX REV CODE- 250/637: Performed by: INTERNAL MEDICINE

## 2019-11-10 RX ORDER — FUROSEMIDE 10 MG/ML
40 INJECTION INTRAMUSCULAR; INTRAVENOUS ONCE
Status: COMPLETED | OUTPATIENT
Start: 2019-11-10 | End: 2019-11-10

## 2019-11-10 RX ORDER — SODIUM CHLORIDE 9 MG/ML
250 INJECTION, SOLUTION INTRAVENOUS AS NEEDED
Status: DISCONTINUED | OUTPATIENT
Start: 2019-11-10 | End: 2019-11-13

## 2019-11-10 RX ADMIN — ALLOPURINOL 300 MG: 300 TABLET ORAL at 12:07

## 2019-11-10 RX ADMIN — PREDNISOLONE ACETATE 2 DROP: 10 SUSPENSION/ DROPS OPHTHALMIC at 12:30

## 2019-11-10 RX ADMIN — LORAZEPAM 1 MG: 1 TABLET ORAL at 21:25

## 2019-11-10 RX ADMIN — DIPHENHYDRAMINE HYDROCHLORIDE 25 MG: 25 CAPSULE ORAL at 21:42

## 2019-11-10 RX ADMIN — ACETAMINOPHEN 650 MG: 325 TABLET, FILM COATED ORAL at 13:31

## 2019-11-10 RX ADMIN — DIPHENHYDRAMINE HYDROCHLORIDE 25 MG: 25 CAPSULE ORAL at 13:32

## 2019-11-10 RX ADMIN — PRAVASTATIN SODIUM 10 MG: 20 TABLET ORAL at 21:26

## 2019-11-10 RX ADMIN — DEXTROSE MONOHYDRATE 20 MG: 5 INJECTION, SOLUTION INTRAVENOUS at 16:48

## 2019-11-10 RX ADMIN — SODIUM CHLORIDE 75 ML/HR: 900 INJECTION, SOLUTION INTRAVENOUS at 08:04

## 2019-11-10 RX ADMIN — LEVOFLOXACIN 500 MG: 500 TABLET, FILM COATED ORAL at 13:32

## 2019-11-10 RX ADMIN — VORICONAZOLE 200 MG: 200 TABLET, FILM COATED ORAL at 21:26

## 2019-11-10 RX ADMIN — POTASSIUM & SODIUM PHOSPHATES POWDER PACK 280-160-250 MG 1 PACKET: 280-160-250 PACK at 12:08

## 2019-11-10 RX ADMIN — CYTARABINE 4040 MG: 2 INJECTION INTRATHECAL; INTRAVENOUS; SUBCUTANEOUS at 22:05

## 2019-11-10 RX ADMIN — Medication 400 MG: at 17:38

## 2019-11-10 RX ADMIN — DULOXETINE 30 MG: 30 CAPSULE, DELAYED RELEASE ORAL at 12:08

## 2019-11-10 RX ADMIN — VORICONAZOLE 200 MG: 200 TABLET, FILM COATED ORAL at 12:11

## 2019-11-10 RX ADMIN — ONDANSETRON 4 MG: 2 INJECTION INTRAMUSCULAR; INTRAVENOUS at 16:22

## 2019-11-10 RX ADMIN — DEXAMETHASONE SODIUM PHOSPHATE 10 MG: 10 INJECTION, SOLUTION INTRAMUSCULAR; INTRAVENOUS at 16:22

## 2019-11-10 RX ADMIN — FLUDARABINE PHOSPHATE 61 MG: 25 INJECTION, SOLUTION INTRAVENOUS at 17:22

## 2019-11-10 RX ADMIN — POTASSIUM & SODIUM PHOSPHATES POWDER PACK 280-160-250 MG 1 PACKET: 280-160-250 PACK at 16:27

## 2019-11-10 RX ADMIN — ACYCLOVIR 400 MG: 800 TABLET ORAL at 17:38

## 2019-11-10 RX ADMIN — ACETAMINOPHEN 650 MG: 325 TABLET, FILM COATED ORAL at 17:42

## 2019-11-10 RX ADMIN — Medication 2 TABLET: at 13:32

## 2019-11-10 RX ADMIN — ACYCLOVIR 400 MG: 800 TABLET ORAL at 12:07

## 2019-11-10 RX ADMIN — PREDNISOLONE ACETATE 2 DROP: 10 SUSPENSION/ DROPS OPHTHALMIC at 06:08

## 2019-11-10 RX ADMIN — Medication 400 MG: at 12:08

## 2019-11-10 RX ADMIN — FUROSEMIDE 40 MG: 10 INJECTION, SOLUTION INTRAMUSCULAR; INTRAVENOUS at 18:44

## 2019-11-10 RX ADMIN — PREDNISOLONE ACETATE 2 DROP: 10 SUSPENSION/ DROPS OPHTHALMIC at 21:43

## 2019-11-10 RX ADMIN — POTASSIUM & SODIUM PHOSPHATES POWDER PACK 280-160-250 MG 1 PACKET: 280-160-250 PACK at 21:26

## 2019-11-10 RX ADMIN — PREDNISOLONE ACETATE 2 DROP: 10 SUSPENSION/ DROPS OPHTHALMIC at 17:38

## 2019-11-10 NOTE — PROGRESS NOTES
[facility] Inpatient Hematology / Oncology Daily Progress Note Reason for Admission:  Admission for antineoplastic chemotherapy [Z51.11] 24 Hour Events: 
Afebrile, VSS Day 3  FLAG-VENITA  at bedside Walking the halls No issues or complaints Transfusions planned for today ROS: 
Constitutional: Negative for fever, chills, weakness, malaise, fatigue. CV: Negative for chest pain, palpitations, edema. Respiratory: Negative for dyspnea, cough, wheezing. GI: Negative for nausea, abdominal pain, diarrhea. 10 point review of systems is otherwise negative with the exception of the elements mentioned above in the HPI. No Known Allergies Past Medical History:  
Diagnosis Date  AML (acute myeloid leukemia) ([facility] [address] dx- 4/2019  
 followed by dr Lorenzo Lawrence  Depression  Hypercholesterolemia  Infection   
 of port -- was placed 5/2019-- removed 6/2019---right chest  
 Psychiatric disorder   
 aniexty  Sleep apnea Past Surgical History:  
Procedure Laterality Date  HX OTHER SURGICAL    
 colonoscopy  HX VASCULAR ACCESS    
 IR INSERT TUNL CVC W PORT OVER 5 YEARS  4/30/2019  IR INSERT TUNL CVC W PORT OVER 5 YEARS  7/15/2019  IR REMOVE TUNL CVAD W PORT/PUMP  6/13/2019 Family History Problem Relation Age of Onset  Cancer Father Social History Socioeconomic History  Marital status:  Spouse name: Not on file  Number of children: Not on file  Years of education: Not on file  Highest education level: Not on file Occupational History  Not on file Social Needs  Financial resource strain: Not on file  Food insecurity:  
  Worry: Not on file Inability: Not on file  Transportation needs:  
  Medical: Not on file Non-medical: Not on file Tobacco Use  Smoking status: Never Smoker  Smokeless tobacco: Never Used Substance and Sexual Activity  Alcohol use: Never Frequency: Never  Drug use: Never  Sexual activity: Not on file Lifestyle  Physical activity:  
  Days per week: Not on file Minutes per session: Not on file  Stress: Not on file Relationships  Social connections:  
  Talks on phone: Not on file Gets together: Not on file Attends Shinto service: Not on file Active member of club or organization: Not on file Attends meetings of clubs or organizations: Not on file Relationship status: Not on file  Intimate partner violence:  
  Fear of current or ex partner: Not on file Emotionally abused: Not on file Physically abused: Not on file Forced sexual activity: Not on file Other Topics Concern [address] Service Not Asked  Blood Transfusions Not Asked  Caffeine Concern Not Asked  Occupational Exposure Not Asked [employer] Hazards Not Asked  Sleep Concern Not Asked  Stress Concern Not Asked  Weight Concern Not Asked  Special Diet Not Asked  Back Care Not Asked  Exercise Not Asked  Bike Helmet Not Asked  Seat Belt Not Asked  Self-Exams Not Asked Social History Narrative  Not on file Current Facility-Administered Medications Medication Dose Route Frequency Provider Last Rate Last Dose  
 0.9% sodium chloride infusion 250 mL  250 mL IntraVENous PRN Monika De Jesus MD      
 potassium, sodium phosphates (NEUTRA-PHOS) packet 1 Packet  1 Packet Oral TID Monika De Jesus MD   1 Packet at 11/09/19 2131  
 magnesium oxide (MAG-OX) tablet 400 mg  400 mg Oral BID Rosa Elena Medeiros NP   400 mg at 11/09/19 1735  dexamethasone (PF) (DECADRON) injection 10 mg  10 mg IntraVENous Q24H Monika De Jesus MD   10 mg at 11/09/19 1153  LORazepam (ATIVAN) injection 0.5 mg  0.5 mg IntraVENous Q6H PRN Monika De Jesus MD      
 prednisoLONE acetate (PRED FORTE) 1 % ophthalmic suspension 2 Drop  2 Drop Both Eyes Q6H Monika De Jesus MD   2 Drop at 11/10/19 5757  fludarabine (FLUDARA) 61 mg in 0.9% sodium chloride 100 mL chemo infusion  30 mg/m2 (Treatment Plan Recorded) IntraVENous Q24H Dora Horner  mL/hr at 11/09/19 1448 61 mg at 11/09/19 1448  cytarabine (CYTOSAR) 4,040 mg in 0.9% sodium chloride 500 mL chemo infusion  2 g/m2 (Treatment Plan Recorded) IntraVENous Q24H Dora Horner MD   Stopped at 11/09/19 2246  IDArubicin (IDAMYCIN) 20 mg in dextrose 5% 50 mL chemo infusion  10 mg/m2 (Treatment Plan Recorded) IntraVENous Q24H Dora Horner  mL/hr at 11/09/19 1408 20 mg at 11/09/19 1408  saline peripheral flush soln 10 mL  10 mL InterCATHeter PRN Dora Horner MD      
 heparin (porcine) pf 300-500 Units  300-500 Units InterCATHeter PRN MD Randi Bobby ON 11/13/2019] tbo-filgrastim (GRANIX) injection 480 mcg  480 mcg SubCUTAneous QHS Dora Horner MD      
 acyclovir (ZOVIRAX) tablet 400 mg  400 mg Oral BID Bria , NP   400 mg at 11/09/19 1735  allopurinol (ZYLOPRIM) tablet 300 mg  300 mg Oral DAILY Bria , NP   300 mg at 11/09/19 6243  cholecalciferol (VITAMIN D3) (400 Units /10 mcg) tablet 2 Tab  800 Units Oral DAILY Bria , NP   2 Tab at 11/09/19 0175  DULoxetine (CYMBALTA) capsule 30 mg  30 mg Oral DAILY Bria , NP   30 mg at 11/09/19 2117  lidocaine-prilocaine (EMLA) 2.5-2.5 % cream   Topical PRN Bria , NP      
 LORazepam (ATIVAN) tablet 1 mg  1 mg Oral QHS Bria , NP   1 mg at 11/09/19 2131  pravastatin (PRAVACHOL) tablet 10 mg  10 mg Oral QHS Bria , NP   10 mg at 11/09/19 2131  voriconazole (VFEND) tablet 200 mg  200 mg Oral Q12H Bria , NP   200 mg at 11/09/19 2131  
 0.9% sodium chloride infusion  75 mL/hr IntraVENous CONTINUOUS Bria , NP 75 mL/hr at 11/10/19 0804 75 mL/hr at 11/10/19 8710  ondansetron (ZOFRAN) injection 4 mg  4 mg IntraVENous Q4H PRN Bria Younger NP      
 prochlorperazine (COMPAZINE) with saline injection 5 mg  5 mg IntraVENous Q6H PRN Darby Nunez NP      
 HYDROcodone-acetaminophen [facility]) 5-325 mg per tablet 1 Tab  1 Tab Oral Q6H PRN Darby Nunez NP      
 morphine injection 2 mg  2 mg IntraVENous Q4H PRN Darby Nunez NP      
 acetaminophen (TYLENOL) tablet 650 mg  650 mg Oral Q6H PRN Lily Lopez MD   650 mg at 19 1441  diphenhydrAMINE (BENADRYL) capsule 25 mg  25 mg Oral Q6H PRN Lily Lopez MD   25 mg at 19 2131  
 vitamin a-vitamin c-vit e-min (OCUVITE) tablet 1 Tab (Patient Supplied)  1 Tab Oral DAILY Lily Lopez MD      
 levoFLOXacin [facility]) tablet 500 mg  500 mg Oral Q24H Darby Nunez NP   500 mg at 19 1402  acetaminophen/diphenhydrAMINE (TYLENOL PM EXT STR) 500/25 mg (Patient Supplied)   Oral QHS Lily Lopez MD      
 vit C,A-Fb-golyz-lutein-zeaxan (PRESERVISION AREDS-2) capsule 1 Cap (Patient Supplied)  [address] Lily Lopez MD   1 Cap at 19 7251 OBJECTIVE: 
Patient Vitals for the past 8 hrs: 
 BP Temp Pulse Resp SpO2  
11/10/19 0803 140/66 98.6 °F (37 °C) 72 16 97 % 11/10/19 0333 123/64 98.4 °F (36.9 °C) 69 16 95 % Temp (24hrs), Av.5 °F (36.9 °C), Min:98.3 °F (36.8 °C), Max:98.7 °F (37.1 °C) 
 
11/10 0701 - 11/10 1900 In: -  
Out: 500 [Urine:500] Physical Exam: 
Constitutional: Well developed, well nourished female in no acute distress, sitting comfortably in the hospital bed. HEENT: Normocephalic and atraumatic. Oropharynx is clear, mucous membranes are moist. Extraocular muscles are intact. Sclerae anicteric. Neck supple without JVD. No thyromegaly present. Skin Warm and dry. No bruising and no rash noted. No erythema. No pallor. Respiratory Lungs are clear to auscultation bilaterally without wheezes, rales or rhonchi, normal air exchange without accessory muscle use. CVS Normal rate, regular rhythm and normal S1 and S2. No murmurs, gallops, or rubs. Abdomen Soft, nontender and nondistended, normoactive bowel sounds. No palpable mass. No hepatosplenomegaly. Neuro Grossly nonfocal with no obvious sensory or motor deficits. MSK Normal range of motion in general.  No edema and no tenderness. Psych Appropriate mood and affect. Labs:   
Recent Results (from the past 24 hour(s)) METABOLIC PANEL, COMPREHENSIVE Collection Time: 11/10/19  3:13 AM  
Result Value Ref Range Sodium 143 136 - 145 mmol/L Potassium 4.3 3.5 - 5.1 mmol/L Chloride 111 (H) 98 - 107 mmol/L  
 CO2 28 21 - 32 mmol/L Anion gap 4 (L) 7 - 16 mmol/L Glucose 147 (H) 65 - 100 mg/dL BUN 22 8 - 23 MG/DL Creatinine 0.68 0.6 - 1.0 MG/DL  
 GFR est AA >60 >60 ml/min/1.73m2 GFR est non-AA >60 >60 ml/min/1.73m2 Calcium 7.8 (L) 8.3 - 10.4 MG/DL Bilirubin, total 0.3 0.2 - 1.1 MG/DL  
 ALT (SGPT) 22 12 - 65 U/L  
 AST (SGOT) 21 15 - 37 U/L Alk. phosphatase 80 50 - 136 U/L Protein, total 5.9 (L) 6.3 - 8.2 g/dL Albumin 2.5 (L) 3.2 - 4.6 g/dL Globulin 3.4 2.3 - 3.5 g/dL A-G Ratio 0.7 (L) 1.2 - 3.5 MAGNESIUM Collection Time: 11/10/19  3:13 AM  
Result Value Ref Range Magnesium 2.2 1.8 - 2.4 mg/dL LD Collection Time: 11/10/19  3:13 AM  
Result Value Ref Range  (H) 110 - 210 U/L  
URIC ACID Collection Time: 11/10/19  3:13 AM  
Result Value Ref Range Uric acid 3.3 2.6 - 6.0 MG/DL  
PHOSPHORUS Collection Time: 11/10/19  3:13 AM  
Result Value Ref Range Phosphorus 2.5 2.3 - 3.7 MG/DL PROTHROMBIN TIME + INR Collection Time: 11/10/19  3:13 AM  
Result Value Ref Range Prothrombin time 20.5 (H) 11.7 - 14.5 sec INR 1.7 PTT Collection Time: 11/10/19  3:13 AM  
Result Value Ref Range aPTT 39.5 24.7 - 39.8 SEC FIBRINOGEN Collection Time: 11/10/19  3:13 AM  
Result Value Ref Range Fibrinogen 78 (L) 190 - 501 mg/dL CBC WITH AUTOMATED DIFF Collection Time: 11/10/19  3:13 AM  
Result Value Ref Range WBC 0.1 (LL) 4.3 - 11.1 K/uL  
 RBC 2.31 (L) 4.05 - 5.2 M/uL HGB 6.8 (LL) 11.7 - 15.4 g/dL HCT 20.2 (LL) 35.8 - 46.3 % MCV 87.4 79.6 - 97.8 FL  
 MCH 29.4 26.1 - 32.9 PG  
 MCHC 33.7 31.4 - 35.0 g/dL  
 RDW 13.2 11.9 - 14.6 % PLATELET 8 (LL) 185 - 450 K/uL MPV 10.1 9.4 - 12.3 FL ABSOLUTE NRBC 0.00 0.0 - 0.2 K/uL  
 RBC COMMENTS SLIGHT 
ANISOCYTOSIS + POIKILOCYTOSIS 
    
 WBC COMMENTS WBC TOO FEW TO DIFFERENTIATE PLATELET COMMENTS MARKED    
 DF MANUAL    
TYPE + CROSSMATCH Collection Time: 11/10/19  6:31 AM  
Result Value Ref Range Crossmatch Expiration 11/13/2019 ABO/Rh(D) AB POSITIVE Antibody screen NEG Unit number [id number] Blood component type RC LRIR Unit division 00 Status of unit ALLOCATED Crossmatch result Compatible Imaging: 
n/a ASSESSMENT: 
Problem List  Date Reviewed: 10/31/2019 Codes Class Noted Febrile neutropenia (HCC) ICD-10-CM: D70.9, R50.81 ICD-9-CM: 288.00, 780.61  9/21/2019 Pancytopenia due to antineoplastic chemotherapy [facility]) ICD-10-CM: D61.810, T45.1X5A 
ICD-9-CM: 284.11, E933.1  6/12/2019 Cellulitis of neck ICD-10-CM: Q00.706 ICD-9-CM: 682.1  6/12/2019 Immunocompromised status associated with infection ICD-10-CM: B99.9 ICD-9-CM: 136.9  6/12/2019 Port or reservoir infection ICD-10-CM: J03.950H ICD-9-CM: 999.33  6/12/2019 Acute myeloid leukemia not having achieved remission ([facility] [address] ICD-10-CM: C92.00 ICD-9-CM: 205.00  5/9/2019 Admission for antineoplastic chemotherapy ICD-10-CM: Z51.11 ICD-9-CM: V58.11  5/5/2019 AML (acute myeloblastic leukemia) ([facility] ICD-10-CM: C92.00 ICD-9-CM: 205.00  4/28/2019 Weakness generalized ICD-10-CM: R53.1 ICD-9-CM: 780.79  4/28/2019 Pancytopenia ([facility] ICD-10-CM: W65.300 ICD-9-CM: 284.19  4/28/2019 Thrombocytopenia ([facility] ICD-10-CM: D69.6 ICD-9-CM: 287.5  4/27/2019 Ms. Aurora Rodriguez is a 68 y.o. female admitted on 11/7/2019. She is a known patient of Dr. 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 She failed induction with 7+3/Midostaurin. On Dacogen/Gilteritinib with recent BMbx with persistent residual disease. She is being admitted for FLAG-VENITA. She is feeling well and is ready to proceed with treatment. PLAN: 
AML 
- admit for salvage FLAG-VENITA 
- needs Echo prior - ordered 11/8 Day 1 FLAG-VENITA. Echo with EF 55-60%, proceed with tx. 
11/9 Day 2 FLAG-VENITA. Tolerating well, no issues. Uric acid 3.1 today. 11/10 Day 3 FLAG-VENITA. No issues with treatment. Uric acid 3.3 today. Pancytopenia secondary to disease - Transfuse prn per [facility] SOPs 
11/10 transfuse PRBCs and platelets today. Will give lasix x 1 as well. Continue home meds Prophylactic Antibx: Acyclovir, Voriconazole, Levaquin [facility] SOPs SCDs for DVT prophylaxis (AC contraindicated d/t thrombocytopenia) Disposition:  Will need to stay during count recovery after completion of chemotherapy. Expect hospitalization for several weeks. Upon discharge will need twice weekly labs w transfusions as needed. Weekly provider visits. RTC within 1 week from discharge. [facility] [address] [address] Office : [phone] Fax : [phone]

## 2019-11-11 LAB
ALBUMIN SERPL-MCNC: 2.5 G/DL (ref 3.2–4.6)
ALBUMIN/GLOB SERPL: 0.8 {RATIO} (ref 1.2–3.5)
ALP SERPL-CCNC: 76 U/L (ref 50–136)
ALT SERPL-CCNC: 24 U/L (ref 12–65)
ANION GAP SERPL CALC-SCNC: 6 MMOL/L (ref 7–16)
APTT PPP: 32.7 SEC (ref 24.7–39.8)
AST SERPL-CCNC: 25 U/L (ref 15–37)
BILIRUB SERPL-MCNC: 0.3 MG/DL (ref 0.2–1.1)
BLD PROD TYP BPU: NORMAL
BPU ID: NORMAL
BUN SERPL-MCNC: 27 MG/DL (ref 8–23)
CALCIUM SERPL-MCNC: 7.7 MG/DL (ref 8.3–10.4)
CHLORIDE SERPL-SCNC: 109 MMOL/L (ref 98–107)
CO2 SERPL-SCNC: 28 MMOL/L (ref 21–32)
CREAT SERPL-MCNC: 0.71 MG/DL (ref 0.6–1)
DIFFERENTIAL METHOD BLD: ABNORMAL
ERYTHROCYTE [DISTWIDTH] IN BLOOD BY AUTOMATED COUNT: 13.2 % (ref 11.9–14.6)
FIBRINOGEN PPP-MCNC: 140 MG/DL (ref 190–501)
GLOBULIN SER CALC-MCNC: 3.3 G/DL (ref 2.3–3.5)
GLUCOSE SERPL-MCNC: 165 MG/DL (ref 65–100)
HCT VFR BLD AUTO: 19.9 % (ref 35.8–46.3)
HGB BLD-MCNC: 7 G/DL (ref 11.7–15.4)
INR PPP: 1.5
LDH SERPL L TO P-CCNC: 386 U/L (ref 110–210)
MAGNESIUM SERPL-MCNC: 2.2 MG/DL (ref 1.8–2.4)
MCH RBC QN AUTO: 30.7 PG (ref 26.1–32.9)
MCHC RBC AUTO-ENTMCNC: 35.2 G/DL (ref 31.4–35)
MCV RBC AUTO: 87.3 FL (ref 79.6–97.8)
NRBC # BLD: 0 K/UL (ref 0–0.2)
PHOSPHATE SERPL-MCNC: 2.7 MG/DL (ref 2.3–3.7)
PLATELET # BLD AUTO: 45 K/UL (ref 150–450)
PLATELET COMMENTS,PCOM: ABNORMAL
PMV BLD AUTO: 10.6 FL (ref 9.4–12.3)
POTASSIUM SERPL-SCNC: 4.3 MMOL/L (ref 3.5–5.1)
PROT SERPL-MCNC: 5.8 G/DL (ref 6.3–8.2)
PROTHROMBIN TIME: 18.6 SEC (ref 11.7–14.5)
RBC # BLD AUTO: 2.28 M/UL (ref 4.05–5.2)
RBC MORPH BLD: ABNORMAL
SODIUM SERPL-SCNC: 143 MMOL/L (ref 136–145)
STATUS OF UNIT,%ST: NORMAL
UNIT DIVISION, %UDIV: 0
URATE SERPL-MCNC: 3.6 MG/DL (ref 2.6–6)
WBC # BLD AUTO: 0 K/UL (ref 4.3–11.1)
WBC MORPH BLD: ABNORMAL

## 2019-11-11 PROCEDURE — 74011250636 HC RX REV CODE- 250/636: Performed by: NURSE PRACTITIONER

## 2019-11-11 PROCEDURE — 84550 ASSAY OF BLOOD/URIC ACID: CPT

## 2019-11-11 PROCEDURE — 74011250636 HC RX REV CODE- 250/636: Performed by: INTERNAL MEDICINE

## 2019-11-11 PROCEDURE — 99233 SBSQ HOSP IP/OBS HIGH 50: CPT | Performed by: INTERNAL MEDICINE

## 2019-11-11 PROCEDURE — 83735 ASSAY OF MAGNESIUM: CPT

## 2019-11-11 PROCEDURE — 77030020263 HC SOL INJ SOD CL0.9% LFCR 1000ML

## 2019-11-11 PROCEDURE — 85610 PROTHROMBIN TIME: CPT

## 2019-11-11 PROCEDURE — 85730 THROMBOPLASTIN TIME PARTIAL: CPT

## 2019-11-11 PROCEDURE — 86644 CMV ANTIBODY: CPT

## 2019-11-11 PROCEDURE — 84100 ASSAY OF PHOSPHORUS: CPT

## 2019-11-11 PROCEDURE — 74011250637 HC RX REV CODE- 250/637: Performed by: NURSE PRACTITIONER

## 2019-11-11 PROCEDURE — 74011250637 HC RX REV CODE- 250/637: Performed by: INTERNAL MEDICINE

## 2019-11-11 PROCEDURE — 83615 LACTATE (LD) (LDH) ENZYME: CPT

## 2019-11-11 PROCEDURE — 36591 DRAW BLOOD OFF VENOUS DEVICE: CPT

## 2019-11-11 PROCEDURE — 36430 TRANSFUSION BLD/BLD COMPNT: CPT

## 2019-11-11 PROCEDURE — 80053 COMPREHEN METABOLIC PANEL: CPT

## 2019-11-11 PROCEDURE — 65270000015 HC RM PRIVATE ONCOLOGY

## 2019-11-11 PROCEDURE — 65270000029 HC RM PRIVATE

## 2019-11-11 PROCEDURE — P9040 RBC LEUKOREDUCED IRRADIATED: HCPCS

## 2019-11-11 PROCEDURE — 74011000258 HC RX REV CODE- 258: Performed by: INTERNAL MEDICINE

## 2019-11-11 PROCEDURE — 85384 FIBRINOGEN ACTIVITY: CPT

## 2019-11-11 PROCEDURE — 85025 COMPLETE CBC W/AUTO DIFF WBC: CPT

## 2019-11-11 RX ORDER — AMOXICILLIN 250 MG
1 CAPSULE ORAL
Status: DISCONTINUED | OUTPATIENT
Start: 2019-11-11 | End: 2019-11-11

## 2019-11-11 RX ORDER — DOCUSATE SODIUM 100 MG/1
100 CAPSULE, LIQUID FILLED ORAL
Status: DISCONTINUED | OUTPATIENT
Start: 2019-11-11 | End: 2019-12-12 | Stop reason: HOSPADM

## 2019-11-11 RX ORDER — SODIUM CHLORIDE 9 MG/ML
250 INJECTION, SOLUTION INTRAVENOUS AS NEEDED
Status: DISCONTINUED | OUTPATIENT
Start: 2019-11-11 | End: 2019-11-13

## 2019-11-11 RX ADMIN — DULOXETINE 30 MG: 30 CAPSULE, DELAYED RELEASE ORAL at 08:36

## 2019-11-11 RX ADMIN — SODIUM CHLORIDE 250 ML: 900 INJECTION, SOLUTION INTRAVENOUS at 10:04

## 2019-11-11 RX ADMIN — VORICONAZOLE 200 MG: 200 TABLET, FILM COATED ORAL at 08:35

## 2019-11-11 RX ADMIN — Medication 10 ML: at 21:43

## 2019-11-11 RX ADMIN — POTASSIUM & SODIUM PHOSPHATES POWDER PACK 280-160-250 MG 1 PACKET: 280-160-250 PACK at 08:36

## 2019-11-11 RX ADMIN — Medication 2 TABLET: at 08:34

## 2019-11-11 RX ADMIN — PRAVASTATIN SODIUM 10 MG: 20 TABLET ORAL at 21:03

## 2019-11-11 RX ADMIN — LORAZEPAM 1 MG: 1 TABLET ORAL at 21:03

## 2019-11-11 RX ADMIN — HYDROCODONE BITARTRATE AND ACETAMINOPHEN 1 TABLET: 5; 325 TABLET ORAL at 05:06

## 2019-11-11 RX ADMIN — POTASSIUM & SODIUM PHOSPHATES POWDER PACK 280-160-250 MG 1 PACKET: 280-160-250 PACK at 21:03

## 2019-11-11 RX ADMIN — CYTARABINE 4040 MG: 2 INJECTION INTRATHECAL; INTRAVENOUS; SUBCUTANEOUS at 20:22

## 2019-11-11 RX ADMIN — ACYCLOVIR 400 MG: 800 TABLET ORAL at 08:33

## 2019-11-11 RX ADMIN — PREDNISOLONE ACETATE 2 DROP: 10 SUSPENSION/ DROPS OPHTHALMIC at 05:06

## 2019-11-11 RX ADMIN — DEXAMETHASONE SODIUM PHOSPHATE 10 MG: 10 INJECTION, SOLUTION INTRAMUSCULAR; INTRAVENOUS at 15:31

## 2019-11-11 RX ADMIN — POTASSIUM & SODIUM PHOSPHATES POWDER PACK 280-160-250 MG 1 PACKET: 280-160-250 PACK at 15:30

## 2019-11-11 RX ADMIN — Medication 400 MG: at 17:33

## 2019-11-11 RX ADMIN — Medication 10 ML: at 14:58

## 2019-11-11 RX ADMIN — ALLOPURINOL 300 MG: 300 TABLET ORAL at 08:34

## 2019-11-11 RX ADMIN — PREDNISOLONE ACETATE 2 DROP: 10 SUSPENSION/ DROPS OPHTHALMIC at 21:03

## 2019-11-11 RX ADMIN — Medication 400 MG: at 08:36

## 2019-11-11 RX ADMIN — ACYCLOVIR 400 MG: 800 TABLET ORAL at 17:33

## 2019-11-11 RX ADMIN — ACETAMINOPHEN 650 MG: 325 TABLET, FILM COATED ORAL at 10:03

## 2019-11-11 RX ADMIN — LEVOFLOXACIN 500 MG: 500 TABLET, FILM COATED ORAL at 12:15

## 2019-11-11 RX ADMIN — FLUDARABINE PHOSPHATE 61 MG: 25 INJECTION, SOLUTION INTRAVENOUS at 16:11

## 2019-11-11 RX ADMIN — PREDNISOLONE ACETATE 2 DROP: 10 SUSPENSION/ DROPS OPHTHALMIC at 17:33

## 2019-11-11 RX ADMIN — DIPHENHYDRAMINE HYDROCHLORIDE 25 MG: 25 CAPSULE ORAL at 10:03

## 2019-11-11 RX ADMIN — VORICONAZOLE 200 MG: 200 TABLET, FILM COATED ORAL at 21:03

## 2019-11-11 RX ADMIN — Medication 10 ML: at 09:02

## 2019-11-11 RX ADMIN — DOCUSATE SODIUM 100 MG: 100 CAPSULE, LIQUID FILLED ORAL at 13:08

## 2019-11-11 RX ADMIN — PREDNISOLONE ACETATE 2 DROP: 10 SUSPENSION/ DROPS OPHTHALMIC at 10:59

## 2019-11-11 RX ADMIN — Medication 10 ML: at 13:25

## 2019-11-11 NOTE — PROGRESS NOTES
END OF SHIFT NOTE: 
 
Intake/Output 11/11 0701 - 11/11 1900 In: 2300 [P.O.:1340; I.V.:857] Out: 1800 [Urine:1800] Voiding: yes Catheter no Drain:   
 
 
 
 
 
Stool:   Two reports patient , not please with amount. occurrences. Stool Assessment Stool Appearance: Formed; Soft (11/10/19 2015) Emesis  No occurrences. VITAL SIGNS Patient Vitals for the past 12 hrs: 
 Temp Pulse Resp BP SpO2  
11/11/19 1504 99.3 °F (37.4 °C) 68 18 152/69 96 % 11/11/19 1255 99.4 °F (37.4 °C) 73 18 137/71 97 % 11/11/19 1157 97.8 °F (36.6 °C) 83 18 133/69 97 % 11/11/19 1105 99.4 °F (37.4 °C) 67 18 143/68 96 % 11/11/19 1038 98.9 °F (37.2 °C) 65 20 137/63 95 % 11/11/19 0729 99 °F (37.2 °C) 65 18 153/69 95 % Pain Assessment Pain 1 Pain Scale 1: Numeric (0 - 10) (11/11/19 0600) Pain Intensity 1: 0 (11/11/19 0600) Patient Stated Pain Goal: 0 (11/11/19 0600) Pain Reassessment 1: Yes (11/11/19 0600) Pain Onset 1: suddenly (11/10/19 1000) Pain Location 1: Leg (11/11/19 0505) Pain Orientation 1: Left;Right (11/11/19 0505) Pain Description 1: Aching (11/11/19 0505) Pain Intervention(s) 1: Medication (see MAR) (11/11/19 0505) Ambulating Yes in room, ambulating in hallway once with protective mask in place. Additional Information:  Day 4 FLAG- VENITA Fourth dose of Fludarabine 61 mg IV given over thiry minutes as directed. No complain of nausea. Patient complain of abdominal discomfort needing to have bowel movement. Colace 100 mg po given as directed. Low grade temperature highest 99.4 oral this shift. Patient's  present @ bedside. Patient receive one unit of blood as directed. Shift report given to oncoming nurse Naomi Goncalves / Coral Azevedo RN  at the bedside.  
 
Lonnie Chapman RN

## 2019-11-11 NOTE — PROGRESS NOTES
Chart reviewed by CM pt will remain in the hospital for count recovery (approx 2 weeks) No  Needs from CM at this time. .  Please consult  if any new issues arise CM will continue to follow.

## 2019-11-11 NOTE — PROGRESS NOTES
END OF SHIFT NOTE: 
 
Intake/Output 11/10 1901 -  0700 In: 8392 [P.O.:120; I.V.:909] Out: 3600 [VWPX] Voiding: YES Catheter: NO 
Drain:   
 
 
 
 
 
Stool:  1 occurrences. Stool Assessment Stool Appearance: Formed; Soft (11/10/19 2015) Emesis:  0 occurrences. VITAL SIGNS Patient Vitals for the past 12 hrs: 
 Temp Pulse Resp BP SpO2  
19 0224 98.9 °F (37.2 °C) 69 18 144/65 98 % 11/10/19 2320 98.9 °F (37.2 °C) 74 18 142/73 96 % 11/10/19 2135 99 °F (37.2 °C) 78 18 126/68 98 % 11/10/19 2005 98.8 °F (37.1 °C) 98 18 137/55 97 % 11/10/19 1904 98.8 °F (37.1 °C) 85 18 149/64 97 % Pain Assessment Pain 1 Pain Scale 1: Numeric (0 - 10) (19 050) Pain Intensity 1: 3 (19 050) Patient Stated Pain Goal: 0 (19) Pain Reassessment 1: Yes (11/10/19 1030) Pain Onset 1: suddenly (11/10/19 1000) Pain Location 1: Leg (19 050) Pain Orientation 1: Left;Right (19 050) Pain Description 1: Aching (19 0505) Pain Intervention(s) 1: Medication (see MAR) (19) Ambulating Yes Additional Information: VSS. Afebrile. Cytarabine infused and tolerated well. Pt will need 1 unit of blood today. No needs voiced at this time. Shift report given to oncoming nurse at the bedside.  
 
Adam Baker RN

## 2019-11-11 NOTE — PROGRESS NOTES
[facility] Inpatient Hematology / Oncology Daily Progress Note Reason for Admission:  Admission for antineoplastic chemotherapy [Z51.11] 24 Hour Events: 
Afebrile, VSS Day 4 FLAG-VENITA Tolerating treatment well  at bedside ROS: 
Constitutional: Negative for fever, chills, weakness, malaise, fatigue. CV: Negative for chest pain, palpitations, edema. Respiratory: Negative for dyspnea, cough, wheezing. GI: Negative for nausea, abdominal pain, diarrhea. 10 point review of systems is otherwise negative with the exception of the elements mentioned above in the HPI. No Known Allergies Past Medical History:  
Diagnosis Date  AML (acute myeloid leukemia) ([facility] [address] dx- 4/2019  
 followed by dr Caty Dimas  Depression  Hypercholesterolemia  Infection   
 of port -- was placed 5/2019-- removed 6/2019---right chest  
 Psychiatric disorder   
 aniexty  Sleep apnea Past Surgical History:  
Procedure Laterality Date  HX OTHER SURGICAL    
 colonoscopy  HX VASCULAR ACCESS    
 IR INSERT TUNL CVC W PORT OVER 5 YEARS  4/30/2019  IR INSERT TUNL CVC W PORT OVER 5 YEARS  7/15/2019  IR REMOVE TUNL CVAD W PORT/PUMP  6/13/2019 Family History Problem Relation Age of Onset  Cancer Father Social History Socioeconomic History  Marital status:  Spouse name: Not on file  Number of children: Not on file  Years of education: Not on file  Highest education level: Not on file Occupational History  Not on file Social Needs  Financial resource strain: Not on file  Food insecurity:  
  Worry: Not on file Inability: Not on file  Transportation needs:  
  Medical: Not on file Non-medical: Not on file Tobacco Use  Smoking status: Never Smoker  Smokeless tobacco: Never Used Substance and Sexual Activity  Alcohol use: Never Frequency: Never  Drug use: Never  Sexual activity: Not on file Lifestyle  Physical activity:  
  Days per week: Not on file Minutes per session: Not on file  Stress: Not on file Relationships  Social connections:  
  Talks on phone: Not on file Gets together: Not on file Attends Sabianism service: Not on file Active member of club or organization: Not on file Attends meetings of clubs or organizations: Not on file Relationship status: Not on file  Intimate partner violence:  
  Fear of current or ex partner: Not on file Emotionally abused: Not on file Physically abused: Not on file Forced sexual activity: Not on file Other Topics Concern [address] Service Not Asked  Blood Transfusions Not Asked  Caffeine Concern Not Asked  Occupational Exposure Not Asked Marcial Rivera Hazards Not Asked  Sleep Concern Not Asked  Stress Concern Not Asked  Weight Concern Not Asked  Special Diet Not Asked  Back Care Not Asked  Exercise Not Asked  Bike Helmet Not Asked  Seat Belt Not Asked  Self-Exams Not Asked Social History Narrative  Not on file Current Facility-Administered Medications Medication Dose Route Frequency Provider Last Rate Last Dose  
 0.9% sodium chloride infusion 250 mL  250 mL IntraVENous PRN Meggan Whitlock MD      
 0.9% sodium chloride infusion 250 mL  250 mL IntraVENous PRN Meggan Whitlock MD      
 potassium, sodium phosphates (NEUTRA-PHOS) packet 1 Packet  1 Packet Oral TID Meggan Whitlock MD   1 Packet at 11/11/19 0836  
 magnesium oxide (MAG-OX) tablet 400 mg  400 mg Oral BID Lucinda Jovel NP   400 mg at 11/11/19 0836  
 dexamethasone (PF) (DECADRON) injection 10 mg  10 mg IntraVENous Q24H Meggan Whitlock MD   10 mg at 11/10/19 1622  LORazepam (ATIVAN) injection 0.5 mg  0.5 mg IntraVENous Q6H PRN Meggan Whitlock MD      
 prednisoLONE acetate (PRED FORTE) 1 % ophthalmic suspension 2 Drop  2 Drop Both Eyes Q6H Colleen Gifford MD   2 Drop at 11/11/19 6805  fludarabine (FLUDARA) 61 mg in 0.9% sodium chloride 100 mL chemo infusion  30 mg/m2 (Treatment Plan Recorded) IntraVENous Q24H Colleen Gifford MD   Stopped at 11/10/19 1752  cytarabine (CYTOSAR) 4,040 mg in 0.9% sodium chloride 500 mL chemo infusion  2 g/m2 (Treatment Plan Recorded) IntraVENous Q24H Colleen Gifford MD   Stopped at 11/11/19 1457  saline peripheral flush soln 10 mL  10 mL InterCATHeter PRN Colleen Gifford MD   10 mL at 11/11/19 7651  heparin (porcine) pf 300-500 Units  300-500 Units InterCATHeter PRN Colleen Gifford MD      
Famin Eloisayennifer Johnson Solum ON 11/13/2019] tbo-filgrastim (GRANIX) injection 480 mcg  480 mcg SubCUTAneous QHS Colleen Gifford MD      
 acyclovir (ZOVIRAX) tablet 400 mg  400 mg Oral BID Radha Hernandez, NP   400 mg at 11/11/19 0522  allopurinol (ZYLOPRIM) tablet 300 mg  300 mg Oral DAILY [facility]   300 mg at 11/11/19 1168  cholecalciferol (VITAMIN D3) (400 Units /10 mcg) tablet 2 Tab  800 Units Oral DAILY Bobbygavino Hernandez, NP   2 Tab at 11/11/19 2933  DULoxetine (CYMBALTA) capsule 30 mg  30 mg Oral DAILY [facility]   30 mg at 11/11/19 0182  lidocaine-prilocaine (EMLA) 2.5-2.5 % cream   Topical PRN Radha Hernandez, NP      
 LORazepam (ATIVAN) tablet 1 mg  1 mg Oral QHS Radha Hernandez, NP   1 mg at 11/10/19 2125  pravastatin (PRAVACHOL) tablet 10 mg  10 mg Oral QHS Radha Hernandez, NP   10 mg at 11/10/19 2126  voriconazole (VFEND) tablet 200 mg  200 mg Oral Q12H Radha Hernandez, NP   200 mg at 11/11/19 0835  
 0.9% sodium chloride infusion  75 mL/hr IntraVENous CONTINUOUS Radha Hernandez, NP 75 mL/hr at 11/10/19 0804 75 mL/hr at 11/10/19 9203  ondansetron (ZOFRAN) injection 4 mg  4 mg IntraVENous Q4H PRN Radha Hernandez NP   4 mg at 11/10/19 1622  prochlorperazine (COMPAZINE) with saline injection 5 mg  5 mg IntraVENous Q6H PRN Radha Hernandez NP      
 HYDROcodone-acetaminophen (NORCO) 5-325 mg per tablet 1 Tab  1 Tab Oral Q6H PRN Ene Blair, NP   1 Tab at 19 7792  morphine injection 2 mg  2 mg IntraVENous Q4H PRN Ene Blair NP      
 acetaminophen (TYLENOL) tablet 650 mg  650 mg Oral Q6H PRN Ayad Neal MD   650 mg at 11/10/19 1742  diphenhydrAMINE (BENADRYL) capsule 25 mg  25 mg Oral Q6H PRN Ayad Neal MD   25 mg at 11/10/19 2142  vitamin a-vitamin c-vit e-min (OCUVITE) tablet 1 Tab (Patient Supplied)  1 Tab Oral DAILY Ayad Neal MD      
 levoFLOXacin [facility]) tablet 500 mg  500 mg Oral Q24H Ene Blair NP   500 mg at 11/10/19 1332  acetaminophen/diphenhydrAMINE (TYLENOL PM EXT STR) 500/25 mg (Patient Supplied)   Oral QHS Ayad Neal MD      
 vit C,S-Sq-wpzid-lutein-zeaxan (PRESERVISION AREDS-2) capsule 1 Cap (Patient Supplied)  [address] Ayad Neal MD   1 Cap at 19 3914 OBJECTIVE: 
Patient Vitals for the past 8 hrs: 
 BP Temp Pulse Resp SpO2  
19 0729 153/69 99 °F (37.2 °C) 65 18 95 % 19 0224 144/65 98.9 °F (37.2 °C) 69 18 98 % Temp (24hrs), Av.9 °F (37.2 °C), Min:98.6 °F (37 °C), Max:99.5 °F (37.5 °C) 
 
 07 - 1900 In: 80 [P.O.:240; I.V.:292] Out: 700 [Urine:700] Physical Exam: 
Constitutional: Well developed, well nourished female in no acute distress, sitting comfortably in the hospital bed. HEENT: Normocephalic and atraumatic. Oropharynx is clear, mucous membranes are moist. Extraocular muscles are intact. Sclerae anicteric. Neck supple without JVD. No thyromegaly present. Skin Warm and dry. No rash noted. No erythema. No pallor. Bruising noted on BUE/R elbow and abdomen. Respiratory Lungs are clear to auscultation bilaterally without wheezes, rales or rhonchi, normal air exchange without accessory muscle use. CVS Normal rate, regular rhythm and normal S1 and S2. No murmurs, gallops, or rubs. Abdomen Soft, nontender and nondistended, normoactive bowel sounds. No palpable mass. No hepatosplenomegaly. Neuro Grossly nonfocal with no obvious sensory or motor deficits. MSK Normal range of motion in general.  No edema and no tenderness. Psych Appropriate mood and affect. Labs:   
Recent Results (from the past 24 hour(s)) METABOLIC PANEL, COMPREHENSIVE Collection Time: 11/11/19  2:26 AM  
Result Value Ref Range Sodium 143 136 - 145 mmol/L Potassium 4.3 3.5 - 5.1 mmol/L Chloride 109 (H) 98 - 107 mmol/L  
 CO2 28 21 - 32 mmol/L Anion gap 6 (L) 7 - 16 mmol/L Glucose 165 (H) 65 - 100 mg/dL BUN 27 (H) 8 - 23 MG/DL Creatinine 0.71 0.6 - 1.0 MG/DL  
 GFR est AA >60 >60 ml/min/1.73m2 GFR est non-AA >60 >60 ml/min/1.73m2 Calcium 7.7 (L) 8.3 - 10.4 MG/DL Bilirubin, total 0.3 0.2 - 1.1 MG/DL  
 ALT (SGPT) 24 12 - 65 U/L  
 AST (SGOT) 25 15 - 37 U/L Alk. phosphatase 76 50 - 136 U/L Protein, total 5.8 (L) 6.3 - 8.2 g/dL Albumin 2.5 (L) 3.2 - 4.6 g/dL Globulin 3.3 2.3 - 3.5 g/dL A-G Ratio 0.8 (L) 1.2 - 3.5 MAGNESIUM Collection Time: 11/11/19  2:26 AM  
Result Value Ref Range Magnesium 2.2 1.8 - 2.4 mg/dL LD Collection Time: 11/11/19  2:26 AM  
Result Value Ref Range  (H) 110 - 210 U/L  
URIC ACID Collection Time: 11/11/19  2:26 AM  
Result Value Ref Range Uric acid 3.6 2.6 - 6.0 MG/DL  
PHOSPHORUS Collection Time: 11/11/19  2:26 AM  
Result Value Ref Range Phosphorus 2.7 2.3 - 3.7 MG/DL PROTHROMBIN TIME + INR Collection Time: 11/11/19  2:26 AM  
Result Value Ref Range Prothrombin time 18.6 (H) 11.7 - 14.5 sec INR 1.5 PTT Collection Time: 11/11/19  2:26 AM  
Result Value Ref Range aPTT 32.7 24.7 - 39.8 SEC FIBRINOGEN Collection Time: 11/11/19  2:26 AM  
Result Value Ref Range Fibrinogen 140 (L) 190 - 501 mg/dL CBC WITH AUTOMATED DIFF Collection Time: 11/11/19  2:26 AM  
Result Value Ref Range WBC 0.0 (LL) 4.3 - 11.1 K/uL  
 RBC 2.28 (L) 4.05 - 5.2 M/uL HGB 7.0 (L) 11.7 - 15.4 g/dL HCT 19.9 (LL) 35.8 - 46.3 % MCV 87.3 79.6 - 97.8 FL  
 MCH 30.7 26.1 - 32.9 PG  
 MCHC 35.2 (H) 31.4 - 35.0 g/dL  
 RDW 13.2 11.9 - 14.6 % PLATELET 45 (L) 013 - 450 K/uL MPV 10.6 9.4 - 12.3 FL ABSOLUTE NRBC 0.00 0.0 - 0.2 K/uL  
 RBC COMMENTS NORMOCYTIC/NORMOCHROMIC    
 WBC COMMENTS WBC TOO FEW TO DIFFERENTIATE PLATELET COMMENTS MODERATE    
 DF MANUAL Imaging: 
n/a ASSESSMENT: 
Problem List  Date Reviewed: 10/31/2019 Codes Class Noted Febrile neutropenia (HCC) ICD-10-CM: D70.9, R50.81 ICD-9-CM: 288.00, 780.61  9/21/2019 Pancytopenia due to antineoplastic chemotherapy [facility]) ICD-10-CM: D61.810, T45.1X5A 
ICD-9-CM: 284.11, E933.1  6/12/2019 Cellulitis of neck ICD-10-CM: L53.702 ICD-9-CM: 682.1  6/12/2019 Immunocompromised status associated with infection ICD-10-CM: B99.9 ICD-9-CM: 136.9  6/12/2019 Port or reservoir infection ICD-10-CM: B68.190B ICD-9-CM: 999.33  6/12/2019 Acute myeloid leukemia not having achieved remission ([facility] ICD-10-CM: C92.00 ICD-9-CM: 205.00  5/9/2019 Admission for antineoplastic chemotherapy ICD-10-CM: Z51.11 ICD-9-CM: V58.11  5/5/2019 [facility] ICD-10-CM: C92.00 ICD-9-CM: 205.00  4/28/2019 Weakness generalized ICD-10-CM: R53.1 ICD-9-CM: 780.79  4/28/2019 P[facility]topenia ([facility] ICD-10-CM: H33.026 ICD-9-CM: 284.19  4/28/2019 Thrombocytopenia ([facility] ICD-10-CM: D69.6 ICD-9-CM: 287.5  4/27/2019 Ms. Clarice Orantes is a 68 y.o. female admitted on 11/7/2019. She is a known patient of Dr. 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 She failed induction with 7+3/Midostaurin. On Dacogen/Gilteritinib with recent BMbx with persistent residual disease. She is being admitted for FLAG-VENITA. She is feeling well and is ready to proceed with treatment. PLAN: 
AML 
- admit for salvage FLAG-VENITA 
- needs Echo prior - ordered 11/8 Day 1 FLAG-VENITA. Echo with EF 55-60%, proceed with tx. 
11/9 Day 2 FLAG-VENITA. Tolerating well, no issues. Uric acid 3.1 today. 11/10 Day 3 FLAG-VENITA. No issues with treatment. Uric acid 3.3 today. 11/11 Day 4 FLAG-VENITA. Tolerating treatment well. G-CSF to start on D6. Pancytopenia secondary to disease - Transfuse prn per [facility] SOPs 
11/11 Transfuse PBRCs R elbow pain 11/11 c/o R elbow pain with limited ROM yesterday. Xray neg. Pain improved today, noted bruising. Normal ROM on exam. 
 
Continue home meds Prophylactic Antibx: Acyclovir, Voriconazole, Levaquin [facility] SOPs SCDs for DVT prophylaxis (AC contraindicated d/t thrombocytopenia) Disposition:  Will need to stay during count recovery after completion of chemotherapy. Expect hospitalization for several weeks. Upon discharge will need twice weekly labs w transfusions as needed. Weekly provider visits. RTC within 1 week from discharge. Lupe Vick NP [facility] [address] [place] [address] Office : [phone] Fax : [phone] Attending Addendum: 
I have personally performed a face to face diagnostic evaluation on this patient. I have reviewed and agree with the care plan as documented above by Lupe Vick N.P.  My findings are as follows: Patient appears stable, heart rate regular without murmurs, abdomen is non-tender, bowel sounds are positive. 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 Ongoing pancytopenia, transfuse blood products as needed. Will get PRBC today. Right elbow discomfort with x-rays negative. Clinically improving per patient today. Continue prophylactic antibiotics including Levaquin, acyclovir and voriconazole. We will plan to keep in-house to hopeful counts recovery. Ambulation encouraged Total time 35 min 50% in direct consultation about the patient's diagnosis and management Shannon Carbajal MD 
[facility]Oncology [id number] [place] [address] Office : [phone] Fax : [phone]

## 2019-11-11 NOTE — PROGRESS NOTES
Arabella-c Infusion completed. Port site clear with positive blood return noted. Pt tolerated well with no neuro deficits noted upon exam. Continuing with current treatment plan.

## 2019-11-11 NOTE — PROGRESS NOTES
Problem: Falls - Risk of 
Goal: *Absence of Falls Description Document Juaquin Iverson Fall Risk and appropriate interventions in the flowsheet. Outcome: Progressing Towards Goal 
Note:  
Fall Risk Interventions: 
Mobility Interventions: Communicate number of staff needed for ambulation/transfer Medication Interventions: Patient to call before getting OOB Elimination Interventions: Call light in reach History of Falls Interventions: Room close to nurse's station

## 2019-11-11 NOTE — PROGRESS NOTES
Pt c/o sudden pain in right elbow with pain and inability to extend right arm or lift arm even with arm bent at elbow. Spoke with Dr. Kellen Martinez and a plain film  right elbow was given by VO.   
 
1100 after warm shower elbow felt better. Attempting to get platelets infused prior going to xray. 18  Spoke with dr. Silvia Pabon regarding  Unavailable platelets in pts blood type orders received for O + platelets that CMV neg and irradiated 1220 pt has now developed a dark blue bruise on the side of Right elbow. Pt has more mobility. [id number] with xray in regards which plain film to order. Xray stated it could be done portable. 1350  Xray her to xray right elbow. 1500 pt made aware that xray of elbow was negative pt now has no pain in elbow and has FROM.

## 2019-11-12 LAB
ALBUMIN SERPL-MCNC: 2.5 G/DL (ref 3.2–4.6)
ALBUMIN/GLOB SERPL: 0.8 {RATIO} (ref 1.2–3.5)
ALP SERPL-CCNC: 70 U/L (ref 50–136)
ALT SERPL-CCNC: 26 U/L (ref 12–65)
ANION GAP SERPL CALC-SCNC: 5 MMOL/L (ref 7–16)
APTT PPP: 28.7 SEC (ref 24.7–39.8)
AST SERPL-CCNC: 26 U/L (ref 15–37)
BILIRUB SERPL-MCNC: 0.3 MG/DL (ref 0.2–1.1)
BUN SERPL-MCNC: 23 MG/DL (ref 8–23)
CALCIUM SERPL-MCNC: 7.7 MG/DL (ref 8.3–10.4)
CHLORIDE SERPL-SCNC: 109 MMOL/L (ref 98–107)
CO2 SERPL-SCNC: 29 MMOL/L (ref 21–32)
CREAT SERPL-MCNC: 0.66 MG/DL (ref 0.6–1)
DIFFERENTIAL METHOD BLD: ABNORMAL
ERYTHROCYTE [DISTWIDTH] IN BLOOD BY AUTOMATED COUNT: 12.8 % (ref 11.9–14.6)
FIBRINOGEN PPP-MCNC: 158 MG/DL (ref 190–501)
GLOBULIN SER CALC-MCNC: 3.1 G/DL (ref 2.3–3.5)
GLUCOSE SERPL-MCNC: 175 MG/DL (ref 65–100)
HCT VFR BLD AUTO: 22 % (ref 35.8–46.3)
HGB BLD-MCNC: 7.6 G/DL (ref 11.7–15.4)
INR PPP: 1.4
LDH SERPL L TO P-CCNC: 399 U/L (ref 110–210)
MAGNESIUM SERPL-MCNC: 2.2 MG/DL (ref 1.8–2.4)
MCH RBC QN AUTO: 29.6 PG (ref 26.1–32.9)
MCHC RBC AUTO-ENTMCNC: 34.5 G/DL (ref 31.4–35)
MCV RBC AUTO: 85.6 FL (ref 79.6–97.8)
NRBC # BLD: 0 K/UL (ref 0–0.2)
PHOSPHATE SERPL-MCNC: 2.5 MG/DL (ref 2.3–3.7)
PLATELET # BLD AUTO: 29 K/UL (ref 150–450)
PLATELET COMMENTS,PCOM: ABNORMAL
PMV BLD AUTO: 10.3 FL (ref 9.4–12.3)
POTASSIUM SERPL-SCNC: 4.2 MMOL/L (ref 3.5–5.1)
PROT SERPL-MCNC: 5.6 G/DL (ref 6.3–8.2)
PROTHROMBIN TIME: 17.3 SEC (ref 11.7–14.5)
RBC # BLD AUTO: 2.57 M/UL (ref 4.05–5.2)
RBC MORPH BLD: ABNORMAL
SODIUM SERPL-SCNC: 143 MMOL/L (ref 136–145)
URATE SERPL-MCNC: 3.3 MG/DL (ref 2.6–6)
WBC # BLD AUTO: 0 K/UL (ref 4.3–11.1)
WBC MORPH BLD: ABNORMAL

## 2019-11-12 PROCEDURE — 85730 THROMBOPLASTIN TIME PARTIAL: CPT

## 2019-11-12 PROCEDURE — 74011250636 HC RX REV CODE- 250/636: Performed by: NURSE PRACTITIONER

## 2019-11-12 PROCEDURE — 74011250636 HC RX REV CODE- 250/636: Performed by: INTERNAL MEDICINE

## 2019-11-12 PROCEDURE — 74011000258 HC RX REV CODE- 258: Performed by: INTERNAL MEDICINE

## 2019-11-12 PROCEDURE — 83735 ASSAY OF MAGNESIUM: CPT

## 2019-11-12 PROCEDURE — 77030020263 HC SOL INJ SOD CL0.9% LFCR 1000ML

## 2019-11-12 PROCEDURE — 83615 LACTATE (LD) (LDH) ENZYME: CPT

## 2019-11-12 PROCEDURE — 99233 SBSQ HOSP IP/OBS HIGH 50: CPT | Performed by: INTERNAL MEDICINE

## 2019-11-12 PROCEDURE — 84100 ASSAY OF PHOSPHORUS: CPT

## 2019-11-12 PROCEDURE — 85384 FIBRINOGEN ACTIVITY: CPT

## 2019-11-12 PROCEDURE — 84550 ASSAY OF BLOOD/URIC ACID: CPT

## 2019-11-12 PROCEDURE — 65270000029 HC RM PRIVATE

## 2019-11-12 PROCEDURE — 85610 PROTHROMBIN TIME: CPT

## 2019-11-12 PROCEDURE — 65270000015 HC RM PRIVATE ONCOLOGY

## 2019-11-12 PROCEDURE — 36591 DRAW BLOOD OFF VENOUS DEVICE: CPT

## 2019-11-12 PROCEDURE — 74011250637 HC RX REV CODE- 250/637: Performed by: NURSE PRACTITIONER

## 2019-11-12 PROCEDURE — 80053 COMPREHEN METABOLIC PANEL: CPT

## 2019-11-12 PROCEDURE — 74011250637 HC RX REV CODE- 250/637: Performed by: INTERNAL MEDICINE

## 2019-11-12 PROCEDURE — 85025 COMPLETE CBC W/AUTO DIFF WBC: CPT

## 2019-11-12 RX ORDER — SODIUM CHLORIDE 0.9 % (FLUSH) 0.9 %
10 SYRINGE (ML) INJECTION AS NEEDED
Status: DISCONTINUED | OUTPATIENT
Start: 2019-11-12 | End: 2019-12-12 | Stop reason: HOSPADM

## 2019-11-12 RX ADMIN — PREDNISOLONE ACETATE 2 DROP: 10 SUSPENSION/ DROPS OPHTHALMIC at 11:03

## 2019-11-12 RX ADMIN — ACYCLOVIR 400 MG: 800 TABLET ORAL at 17:40

## 2019-11-12 RX ADMIN — VORICONAZOLE 200 MG: 200 TABLET, FILM COATED ORAL at 09:24

## 2019-11-12 RX ADMIN — DULOXETINE 30 MG: 30 CAPSULE, DELAYED RELEASE ORAL at 09:31

## 2019-11-12 RX ADMIN — LORAZEPAM 1 MG: 1 TABLET ORAL at 22:01

## 2019-11-12 RX ADMIN — POTASSIUM & SODIUM PHOSPHATES POWDER PACK 280-160-250 MG 1 PACKET: 280-160-250 PACK at 09:24

## 2019-11-12 RX ADMIN — FLUDARABINE PHOSPHATE 61 MG: 25 INJECTION, SOLUTION INTRAVENOUS at 16:10

## 2019-11-12 RX ADMIN — ONDANSETRON 4 MG: 2 INJECTION INTRAMUSCULAR; INTRAVENOUS at 15:36

## 2019-11-12 RX ADMIN — ALLOPURINOL 300 MG: 300 TABLET ORAL at 09:24

## 2019-11-12 RX ADMIN — SODIUM CHLORIDE 75 ML/HR: 900 INJECTION, SOLUTION INTRAVENOUS at 00:30

## 2019-11-12 RX ADMIN — Medication 400 MG: at 17:39

## 2019-11-12 RX ADMIN — POTASSIUM & SODIUM PHOSPHATES POWDER PACK 280-160-250 MG 1 PACKET: 280-160-250 PACK at 22:00

## 2019-11-12 RX ADMIN — POTASSIUM & SODIUM PHOSPHATES POWDER PACK 280-160-250 MG 1 PACKET: 280-160-250 PACK at 15:36

## 2019-11-12 RX ADMIN — DIPHENHYDRAMINE HYDROCHLORIDE 25 MG: 25 CAPSULE ORAL at 22:01

## 2019-11-12 RX ADMIN — VORICONAZOLE 200 MG: 200 TABLET, FILM COATED ORAL at 22:01

## 2019-11-12 RX ADMIN — CYTARABINE 4040 MG: 2 INJECTION INTRATHECAL; INTRAVENOUS; SUBCUTANEOUS at 20:24

## 2019-11-12 RX ADMIN — LEVOFLOXACIN 500 MG: 500 TABLET, FILM COATED ORAL at 12:39

## 2019-11-12 RX ADMIN — PREDNISOLONE ACETATE 2 DROP: 10 SUSPENSION/ DROPS OPHTHALMIC at 06:20

## 2019-11-12 RX ADMIN — DEXAMETHASONE SODIUM PHOSPHATE 10 MG: 10 INJECTION, SOLUTION INTRAMUSCULAR; INTRAVENOUS at 15:36

## 2019-11-12 RX ADMIN — Medication 2 TABLET: at 09:30

## 2019-11-12 RX ADMIN — ACYCLOVIR 400 MG: 800 TABLET ORAL at 09:23

## 2019-11-12 RX ADMIN — Medication 10 ML: at 00:31

## 2019-11-12 RX ADMIN — Medication 400 MG: at 09:24

## 2019-11-12 RX ADMIN — SODIUM CHLORIDE 75 ML/HR: 900 INJECTION, SOLUTION INTRAVENOUS at 16:47

## 2019-11-12 RX ADMIN — PRAVASTATIN SODIUM 10 MG: 20 TABLET ORAL at 22:01

## 2019-11-12 RX ADMIN — PREDNISOLONE ACETATE 2 DROP: 10 SUSPENSION/ DROPS OPHTHALMIC at 17:42

## 2019-11-12 RX ADMIN — PREDNISOLONE ACETATE 2 DROP: 10 SUSPENSION/ DROPS OPHTHALMIC at 22:01

## 2019-11-12 NOTE — PROGRESS NOTES
[address] [facility] Hematology & Oncology Inpatient Hematology / Oncology Daily Progress Note Reason for Admission:  Admission for antineoplastic chemotherapy [Z51.11] 24 Hour Events: 
Afebrile, VSS Day 5 FLAG-VENITA Tolerating treatment well  at bedside ROS: 
Constitutional: Negative for fever, chills, weakness, malaise, fatigue. CV: Negative for chest pain, palpitations, edema. Respiratory: Negative for dyspnea, cough, wheezing. GI: +abd pain. Negative for nausea, diarrhea. 10 point review of systems is otherwise negative with the exception of the elements mentioned above in the HPI. No Known Allergies Past Medical History:  
Diagnosis Date  AML (acute myeloid leukemia) ([facility] [address] dx- 4/2019  
 followed by dr Rose Turpin  Depression  Hypercholesterolemia  Infection   
 of port -- was placed 5/2019-- removed 6/2019---right chest  
 Psychiatric disorder   
 aniexty  Sleep apnea Past Surgical History:  
Procedure Laterality Date  HX OTHER SURGICAL    
 colonoscopy  HX VASCULAR ACCESS    
 IR INSERT TUNL CVC W PORT OVER 5 YEARS  4/30/2019  IR INSERT TUNL CVC W PORT OVER 5 YEARS  7/15/2019  IR REMOVE TUNL CVAD W PORT/PUMP  6/13/2019 Family History Problem Relation Age of Onset  Cancer Father Social History Socioeconomic History  Marital status:  Spouse name: Not on file  Number of children: Not on file  Years of education: Not on file  Highest education level: Not on file Occupational History  Not on file Social Needs  Financial resource strain: Not on file  Food insecurity:  
  Worry: Not on file Inability: Not on file  Transportation needs:  
  Medical: Not on file Non-medical: Not on file Tobacco Use  Smoking status: Never Smoker  Smokeless tobacco: Never Used Substance and Sexual Activity  Alcohol use: Never Frequency: Never  Drug use: Never  Sexual activity: Not on file Lifestyle  Physical activity:  
  Days per week: Not on file Minutes per session: Not on file  Stress: Not on file Relationships  Social connections:  
  Talks on phone: Not on file Gets together: Not on file Attends Scientologist service: Not on file Active member of club or organization: Not on file Attends meetings of clubs or organizations: Not on file Relationship status: Not on file  Intimate partner violence:  
  Fear of current or ex partner: Not on file Emotionally abused: Not on file Physically abused: Not on file Forced sexual activity: Not on file Other Topics Concern [address] Service Not Asked  Blood Transfusions Not Asked  Caffeine Concern Not Asked  Occupational Exposure Not Asked [place] [employer] Hazards Not Asked  Sleep Concern Not Asked  Stress Concern Not Asked  Weight Concern Not Asked  Special Diet Not Asked  Back Care Not Asked  Exercise Not Asked  Bike Helmet Not Asked  Seat Belt Not Asked  Self-Exams Not Asked Social History Narrative  Not on file Current Facility-Administered Medications Medication Dose Route Frequency Provider Last Rate Last Dose  central line flush (saline) syringe 10 mL  10 mL InterCATHeter PRN Stephen Farley MD   10 mL at 11/12/19 0031  
 0.9% sodium chloride infusion 250 mL  250 mL IntraVENous PRN Stephen Farley MD   Stopped at 11/11/19 1900  
 docusate sodium (COLACE) capsule 100 mg  100 mg Oral BID PRN Sully Biswas, NP   100 mg at 11/11/19 1308  
 0.9% sodium chloride infusion 250 mL  250 mL IntraVENous PRN Stephen Farley MD      
 potassium, sodium phosphates (NEUTRA-PHOS) packet 1 Packet  1 Packet Oral TID Stephen Farley MD   1 Packet at 11/12/19 6233  
 magnesium oxide (MAG-OX) tablet 400 mg  400 mg Oral BID Mickiel Cage, NP   400 mg at 11/12/19 0924  
 dexamethasone (PF) (DECADRON) injection 10 mg  10 mg IntraVENous Q24H Camden Hawkins MD   10 mg at 11/11/19 1531  LORazepam (ATIVAN) injection 0.5 mg  0.5 mg IntraVENous Q6H PRN Camden Hawkins MD      
 prednisoLONE acetate (PRED FORTE) 1 % ophthalmic suspension 2 Drop  2 Drop Both Eyes Q6H Camden Hawkins MD   2 Drop at 11/12/19 2022  fludarabine (FLUDARA) 61 mg in 0.9% sodium chloride 100 mL chemo infusion  30 mg/m2 (Treatment Plan Recorded) IntraVENous Q24H Camden Hawkins MD   Stopped at 11/11/19 1645  cytarabine (CYTOSAR) 4,040 mg in 0.9% sodium chloride 500 mL chemo infusion  2 g/m2 (Treatment Plan Recorded) IntraVENous Q24H Camden Hawkins MD   Stopped at 11/12/19 0030  saline peripheral flush soln 10 mL  10 mL InterCATHeter PRN Camden Hawkins MD   10 mL at 11/11/19 2143  heparin (porcine) pf 300-500 Units  300-500 Units InterCATHeter PRN Camden Hawkins MD      
[address] [place] [address] ON 11/13/2019] tbo-filgrastim (GRANIX) injection 480 mcg  480 mcg SubCUTAneous QHS Camden Hawkins MD      
 acyclovir (ZOVIRAX) tablet 400 mg  400 mg Oral BID Chitra Jeffers NP   400 mg at 11/12/19 5426  allopurinol (ZYLOPRIM) tablet 300 mg  300 mg Oral DAILY Chitra Jeffers NP   300 mg at 11/12/19 6140  cholecalciferol (VITAMIN D3) (400 Units /10 mcg) tablet 2 Tab  800 Units Oral DAILY Chitra Jeffers NP   2 Tab at 11/12/19 0930  DULoxetine (CYMBALTA) capsule 30 mg  30 mg Oral DAILY Chitra Jeffers NP   30 mg at 11/12/19 0432  lidocaine-prilocaine (EMLA) 2.5-2.5 % cream   Topical PRN Chitra Jeffers NP      
 LORazepam (ATIVAN) tablet 1 mg  1 mg Oral QHS Chitra Jeffers NP   1 mg at 11/11/19 2103  pravastatin (PRAVACHOL) tablet 10 mg  10 mg Oral QHS Chitra Jeffers NP   10 mg at 11/11/19 2103  voriconazole (VFEND) tablet 200 mg  200 mg Oral Q12H Chitra Jeffers NP   200 mg at 11/12/19 0924  
 0.9% sodium chloride infusion  75 mL/hr IntraVENous CONTINUOUS Chitra Jeffers NP 75 mL/hr at 11/12/19 0030 75 mL/hr at 11/12/19 0030  
 ondansetron (ZOFRAN) injection 4 mg  4 mg IntraVENous Q4H PRN Jose D Juan, Gladies Boast, NP   4 mg at 11/10/19 1622  prochlorperazine (COMPAZINE) with saline injection 5 mg  5 mg IntraVENous Q6H PRN Lucinda Jovel NP      
 HYDROcodone-acetaminophen [facility]) 5-325 mg per tablet 1 Tab  1 Tab Oral Q6H PRN Lucinda Jovel NP   1 Tab at 19 8623  morphine injection 2 mg  2 mg IntraVENous Q4H PRN Lucinda Jovel NP      
 acetaminophen (TYLENOL) tablet 650 mg  650 mg Oral Q6H PRN Meggan Whitlock MD   650 mg at 19 1003  diphenhydrAMINE (BENADRYL) capsule 25 mg  25 mg Oral Q6H PRN Meggan Whitlock MD   25 mg at 19 1003  vitamin a-vitamin c-vit e-min (OCUVITE) tablet 1 Tab (Patient Supplied)  1 Tab Oral DAILY Meggan Whitlock MD      
 levoFLOXacin [facility]) tablet 500 mg  500 mg Oral Q24H Lucinda Jovel NP   500 mg at 19 1215  acetaminophen/diphenhydrAMINE (TYLENOL PM EXT STR) 500/25 mg (Patient Supplied)   Oral QHS Meggan Whitlock MD      
 vit C,K-Fg-xckfg-lutein-zeaxan (PRESERVISION AREDS-2) capsule 1 Cap (Patient Supplied)  [address] Meggan Whitlock MD   1 Cap at 19 1497 OBJECTIVE: 
Patient Vitals for the past 8 hrs: 
 BP Temp Pulse Resp SpO2  
19 0739 135/61 98.7 °F (37.1 °C) 76 18 97 % 19 0310 146/78 99.1 °F (37.3 °C) 66 18 95 % Temp (24hrs), Av.1 °F (37.3 °C), Min:97.8 °F (36.6 °C), Max:99.9 °F (37.7 °C) 
 
 07 -  1900 In: 360 [P.O.:360] Out: - Physical Exam: 
Constitutional: Well developed, well nourished female in no acute distress, sitting comfortably in the hospital bed. HEENT: Normocephalic and atraumatic. Oropharynx is clear, mucous membranes are moist. Extraocular muscles are intact. Sclerae anicteric. Neck supple without JVD. No thyromegaly present. Skin Warm and dry. No rash noted. No erythema. No pallor. Bruising noted on BUE/R elbow and abdomen. Respiratory Lungs are clear to auscultation bilaterally without wheezes, rales or rhonchi, normal air exchange without accessory muscle use. CVS Normal rate, regular rhythm and normal S1 and S2. No murmurs, gallops, or rubs. Abdomen Soft, nontender and nondistended, normoactive bowel sounds. No palpable mass. No hepatosplenomegaly. Neuro Grossly nonfocal with no obvious sensory or motor deficits. MSK Normal range of motion in general.  No edema and no tenderness. Psych Appropriate mood and affect. Labs:   
Recent Results (from the past 24 hour(s)) METABOLIC PANEL, COMPREHENSIVE Collection Time: 11/12/19  3:03 AM  
Result Value Ref Range Sodium 143 136 - 145 mmol/L Potassium 4.2 3.5 - 5.1 mmol/L Chloride 109 (H) 98 - 107 mmol/L  
 CO2 29 21 - 32 mmol/L Anion gap 5 (L) 7 - 16 mmol/L Glucose 175 (H) 65 - 100 mg/dL BUN 23 8 - 23 MG/DL Creatinine 0.66 0.6 - 1.0 MG/DL  
 GFR est AA >60 >60 ml/min/1.73m2 GFR est non-AA >60 >60 ml/min/1.73m2 Calcium 7.7 (L) 8.3 - 10.4 MG/DL Bilirubin, total 0.3 0.2 - 1.1 MG/DL  
 ALT (SGPT) 26 12 - 65 U/L  
 AST (SGOT) 26 15 - 37 U/L Alk. phosphatase 70 50 - 136 U/L Protein, total 5.6 (L) 6.3 - 8.2 g/dL Albumin 2.5 (L) 3.2 - 4.6 g/dL Globulin 3.1 2.3 - 3.5 g/dL A-G Ratio 0.8 (L) 1.2 - 3.5 MAGNESIUM Collection Time: 11/12/19  3:03 AM  
Result Value Ref Range Magnesium 2.2 1.8 - 2.4 mg/dL LD Collection Time: 11/12/19  3:03 AM  
Result Value Ref Range  (H) 110 - 210 U/L  
URIC ACID Collection Time: 11/12/19  3:03 AM  
Result Value Ref Range Uric acid 3.3 2.6 - 6.0 MG/DL  
PHOSPHORUS Collection Time: 11/12/19  3:03 AM  
Result Value Ref Range Phosphorus 2.5 2.3 - 3.7 MG/DL PROTHROMBIN TIME + INR Collection Time: 11/12/19  3:03 AM  
Result Value Ref Range Prothrombin time 17.3 (H) 11.7 - 14.5 sec INR 1.4 PTT Collection Time: 11/12/19  3:03 AM  
Result Value Ref Range aPTT 28.7 24.7 - 39.8 SEC FIBRINOGEN Collection Time: 11/12/19  3:03 AM  
Result Value Ref Range Fibrinogen 158 (L) 190 - 501 mg/dL CBC WITH AUTOMATED DIFF Collection Time: 11/12/19  3:03 AM  
Result Value Ref Range WBC 0.0 (LL) 4.3 - 11.1 K/uL  
 RBC 2.57 (L) 4.05 - 5.2 M/uL HGB 7.6 (L) 11.7 - 15.4 g/dL HCT 22.0 (L) 35.8 - 46.3 % MCV 85.6 79.6 - 97.8 FL  
 MCH 29.6 26.1 - 32.9 PG  
 MCHC 34.5 31.4 - 35.0 g/dL  
 RDW 12.8 11.9 - 14.6 % PLATELET 29 (LL) 935 - 450 K/uL MPV 10.3 9.4 - 12.3 FL ABSOLUTE NRBC 0.00 0.0 - 0.2 K/uL  
 RBC COMMENTS NORMOCYTIC/NORMOCHROMIC    
 WBC COMMENTS WBC TOO FEW TO DIFFERENTIATE PLATELET COMMENTS MARKED    
 DF AUTOMATED Imaging: XR ELBOW RT MIN 3 V [[id number]] Collected: 11/10/19 1421 Order Status: Completed Updated: 11/10/19 1424 Narrative:    
Right elbow Clinical location: Elbow pain after feeling a pop, decreased range of motion FINDINGS: Four views of the right elbow show no fracture or dislocation. There 
is no joint effusion. The soft tissues are unremarkable. Impression:    
IMPRESSION: No acute osseous abnormality or joint derangement of the right 
elbow. ASSESSMENT: 
Problem List  Date Reviewed: 10/31/2019 Codes Class Noted Febrile neutropenia (HCC) ICD-10-CM: D70.9, R50.81 ICD-9-CM: 288.00, 780.61  9/21/2019 Pancytopenia due to antineoplastic chemotherapy [facility]) ICD-10-CM: D61.810, T45.1X5A 
ICD-9-CM: 284.11, E933.1  6/12/2019 Cellulitis of neck ICD-10-CM: I82.517 ICD-9-CM: 682.1  6/12/2019 Immunocompromised status associated with infection ICD-10-CM: B99.9 ICD-9-CM: 136.9  6/12/2019 Port or reservoir infection ICD-10-CM: Q72.542H ICD-9-CM: 999.33  6/12/2019 Acute myeloid leukemia not having achieved remission ([facility] [address] ICD-10-CM: C92.00 ICD-9-CM: 205.00  5/9/2019 Admission for antineoplastic chemotherapy ICD-10-CM: Z51.11 ICD-9-CM: V58.11  5/5/2019 [facility] ICD-10-CM: C92.00 ICD-9-CM: 205.00  4/28/2019 Weakness generalized ICD-10-CM: R53.1 ICD-9-CM: 780.79  4/28/2019 Pancytopenia ([facility] [address] ICD-10-CM: O25.303 ICD-9-CM: 284.19  4/28/2019 [facility] ICD-10-CM: D69.6 ICD-9-CM: 287.5  4/27/2019 Ms. Aramis Graf is a 68 y.o. female admitted on 11/7/2019. She is a known patient of Dr. 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 She failed induction with 7+3/Midostaurin. On Dacogen/Gilteritinib with recent BMbx with persistent residual disease. She is being admitted for FLAG-VENITA. She is feeling well and is ready to proceed with treatment. PLAN: 
AML 
- admit for salvage FLAG-VENITA 
- needs Echo prior - ordered 11/8 Day 1 FLAG-VENITA. Echo with EF 55-60%, proceed with tx. 
11/9 Day 2 FLAG-VENITA. Tolerating well, no issues. Uric acid 3.1 today. 11/10 Day 3 FLAG-VENITA. No issues with treatment. Uric acid 3.3 today. 11/12 Day 5 FLAG-VENITA. Tolerating treatment well. G-CSF to start tomorrow. Pancytopenia secondary to disease - Transfuse prn per [facility] SOPs R elbow pain 11/11 c/o R elbow pain with limited ROM yesterday. Xray neg. Pain improved today, noted bruising. Normal ROM on exam. 
 
Continue home meds Prophylactic Antibx: Acyclovir, Voriconazole, Levaquin [facility] SOPs SCDs for DVT prophylaxis (AC contraindicated d/t thrombocytopenia) Disposition:  Will need to stay during count recovery after completion of chemotherapy. Expect hospitalization for several weeks. Upon discharge will need twice weekly labs w transfusions as needed. Weekly provider visits. RTC within 1 week from discharge. Janette Dash NP [facility] Hematology & Oncology [id number] [place] [address] Office : [phone] Fax : [phone] Attending Addendum: 
I have personally performed a face to face diagnostic evaluation on this patient.  I have reviewed and agree with the care plan as documented above by Janette Dash N.P.  My findings are as follows: Patient appears stable, heart rate regular without murmurs, abdomen is non-tender, bowel sounds are positive. 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 Ongoing pancytopenia, transfuse blood products as needed. S/p PRBC yesterday. Right elbow discomfort with x-rays negative. Clinically improving per patient. Continue prophylactic antibiotics including Levaquin, acyclovir and voriconazole. We will plan to keep in-house to hopeful counts recovery. Ambulation encouraged Total time 35 min 50% in direct consultation about the patient's diagnosis and management Chandu Mcdaniels MD 
[facility] Hematology/Oncology [address] [place] [address] Office : [phone] Fax : [phone]

## 2019-11-12 NOTE — PROGRESS NOTES
Chemo FLAG VENITA Dose 4 of 5 IV Cytarabine infused as ordered via port over 4 hrs;good blood return. Tolerated well;no new neuro changes noted. Chemo teachings reinforced;compliant.

## 2019-11-12 NOTE — PROGRESS NOTES
END OF SHIFT NOTE: 
 
Intake/Output 11/11 1901 - 11/12 0700 In: 1330 [P.O.:370; I.V.:960] Out: 2950 [Urine:2950] Voiding: YES Catheter: NO 
Drain:   
 
 
 
 
 
Stool:  1 occurrences. Stool Assessment Stool Appearance: Formed; Soft (11/11/19 2045) Emesis:  0 occurrences. VITAL SIGNS Patient Vitals for the past 12 hrs: 
 Temp Pulse Resp BP SpO2  
11/12/19 0310 99.1 °F (37.3 °C) 66 18 146/78 95 % 11/11/19 2332 99 °F (37.2 °C) 65 18 153/71 94 % 11/11/19 1940 99.9 °F (37.7 °C) 78 18 157/75 96 % Pain Assessment Pain 1 Pain Scale 1: Numeric (0 - 10) (11/12/19 0250) Pain Intensity 1: 0 (11/12/19 0250) Patient Stated Pain Goal: 0 (11/12/19 0250) Pain Reassessment 1: Patient resting w/respiratory rate greater than 10 (11/12/19 0250) Pain Onset 1: suddenly (11/10/19 1000) Pain Location 1: Leg (11/11/19 0505) Pain Orientation 1: Left;Right (11/11/19 0505) Pain Description 1: Aching (11/11/19 0505) Pain Intervention(s) 1: Medication (see MAR) (11/11/19 0505) Ambulating Yes Additional Information: VSS. Afebrile. Cytarabine infused and tolerated well. No needs voiced at this time Shift report given to oncoming nurse at the bedside.  
 
Walter Ho RN

## 2019-11-12 NOTE — PROGRESS NOTES
END OF SHIFT NOTE: 
 
Intake/Output 11/12 0701 - 11/12 1900 In: 6490 [P.O.:1100; I.V.:368] Out: 700 [Urine:700] Voiding: yes Catheter: no  
Drain:   
 
 
 
 
 
Stool:   Several pudding like consistencies per patient's  description  occurrences. Stool Assessment Stool Color: Kaley Fogo (11/12/19 1138) Stool Appearance: Soft (11/12/19 1138) Stool Amount: Medium (11/12/19 1138) Stool Source/Status: Rectum (11/12/19 1138) Emesis:   No occurrences. VITAL SIGNS Patient Vitals for the past 12 hrs: 
 Temp Pulse Resp BP SpO2  
11/12/19 1528 99.6 °F (37.6 °C) 90 18 140/65 98 % 11/12/19 1135 98.4 °F (36.9 °C) 71 18 171/76 98 % 11/12/19 0739 98.7 °F (37.1 °C) 76 18 135/61 97 % Pain Assessment Pain 1 Pain Scale 1: Numeric (0 - 10) (11/12/19 0250) Pain Intensity 1: 0 (11/12/19 0250) Patient Stated Pain Goal: 0 (11/12/19 0250) Pain Reassessment 1: Patient resting w/respiratory rate greater than 10 (11/12/19 0250) Pain Onset 1: suddenly (11/10/19 1000) Pain Location 1: Leg (11/11/19 0505) Pain Orientation 1: Left;Right (11/11/19 0505) Pain Description 1: Aching (11/11/19 0505) Pain Intervention(s) 1: Medication (see MAR) (11/11/19 0505) Ambulating Yes in hallway with protective mask in place Additional Information:  Day 5 FLAG-VENITA    Patient in  fair spirits. She complain of nausea with no emesis produced, medicated with Zofran 4 mg IV . Patient's  highest temperature 99.6 oral  Patient tolerate Fludarabine 61 mg IV over thirty minutes well. Patient's  present @ bedside. Patient had several pudding stools after yesterday's one time dose of Colace and apple juice. Shift report given to oncoming nurse  KAM Andres and Johnice Osorio, RN  at the bedside.  
 
Rk Oconnor RN

## 2019-11-12 NOTE — PROGRESS NOTES
Problem: Falls - Risk of 
Goal: *Absence of Falls Description Document [place] Fall Risk and appropriate interventions in the flowsheet. Outcome: Progressing Towards Goal 
Note:  
Fall Risk Interventions: 
Mobility Interventions: Communicate number of staff needed for ambulation/transfer Medication Interventions: Patient to call before getting OOB Elimination Interventions: Call light in reach History of Falls Interventions: Consult care management for discharge planning

## 2019-11-13 LAB
ALBUMIN SERPL-MCNC: 2.3 G/DL (ref 3.2–4.6)
ALBUMIN/GLOB SERPL: 0.7 {RATIO} (ref 1.2–3.5)
ALP SERPL-CCNC: 66 U/L (ref 50–136)
ALT SERPL-CCNC: 26 U/L (ref 12–65)
ANION GAP SERPL CALC-SCNC: 4 MMOL/L (ref 7–16)
APTT PPP: 27.6 SEC (ref 24.7–39.8)
AST SERPL-CCNC: 28 U/L (ref 15–37)
BILIRUB SERPL-MCNC: 0.4 MG/DL (ref 0.2–1.1)
BUN SERPL-MCNC: 22 MG/DL (ref 8–23)
CALCIUM SERPL-MCNC: 7.6 MG/DL (ref 8.3–10.4)
CHLORIDE SERPL-SCNC: 109 MMOL/L (ref 98–107)
CO2 SERPL-SCNC: 28 MMOL/L (ref 21–32)
CREAT SERPL-MCNC: 0.62 MG/DL (ref 0.6–1)
DIFFERENTIAL METHOD BLD: ABNORMAL
ERYTHROCYTE [DISTWIDTH] IN BLOOD BY AUTOMATED COUNT: 12.9 % (ref 11.9–14.6)
FIBRINOGEN PPP-MCNC: 208 MG/DL (ref 190–501)
GLOBULIN SER CALC-MCNC: 3.3 G/DL (ref 2.3–3.5)
GLUCOSE SERPL-MCNC: 170 MG/DL (ref 65–100)
HCT VFR BLD AUTO: 19 % (ref 35.8–46.3)
HGB BLD-MCNC: 6.6 G/DL (ref 11.7–15.4)
INR PPP: 1.3
LDH SERPL L TO P-CCNC: 404 U/L (ref 110–210)
MAGNESIUM SERPL-MCNC: 2.1 MG/DL (ref 1.8–2.4)
MCH RBC QN AUTO: 30.1 PG (ref 26.1–32.9)
MCHC RBC AUTO-ENTMCNC: 34.7 G/DL (ref 31.4–35)
MCV RBC AUTO: 86.8 FL (ref 79.6–97.8)
NRBC # BLD: 0 K/UL (ref 0–0.2)
PHOSPHATE SERPL-MCNC: 2.8 MG/DL (ref 2.3–3.7)
PLATELET # BLD AUTO: 16 K/UL (ref 150–450)
PLATELET COMMENTS,PCOM: ABNORMAL
PMV BLD AUTO: 11 FL (ref 9.4–12.3)
POTASSIUM SERPL-SCNC: 4.4 MMOL/L (ref 3.5–5.1)
PROT SERPL-MCNC: 5.6 G/DL (ref 6.3–8.2)
PROTHROMBIN TIME: 16.2 SEC (ref 11.7–14.5)
RBC # BLD AUTO: 2.19 M/UL (ref 4.05–5.2)
RBC MORPH BLD: ABNORMAL
SODIUM SERPL-SCNC: 141 MMOL/L (ref 136–145)
URATE SERPL-MCNC: 2.8 MG/DL (ref 2.6–6)
WBC # BLD AUTO: 0 K/UL (ref 4.3–11.1)
WBC MORPH BLD: ABNORMAL

## 2019-11-13 PROCEDURE — 84550 ASSAY OF BLOOD/URIC ACID: CPT

## 2019-11-13 PROCEDURE — 85610 PROTHROMBIN TIME: CPT

## 2019-11-13 PROCEDURE — 84100 ASSAY OF PHOSPHORUS: CPT

## 2019-11-13 PROCEDURE — 65270000029 HC RM PRIVATE

## 2019-11-13 PROCEDURE — 85025 COMPLETE CBC W/AUTO DIFF WBC: CPT

## 2019-11-13 PROCEDURE — 36591 DRAW BLOOD OFF VENOUS DEVICE: CPT

## 2019-11-13 PROCEDURE — 74011250637 HC RX REV CODE- 250/637: Performed by: INTERNAL MEDICINE

## 2019-11-13 PROCEDURE — 74011250636 HC RX REV CODE- 250/636: Performed by: NURSE PRACTITIONER

## 2019-11-13 PROCEDURE — P9040 RBC LEUKOREDUCED IRRADIATED: HCPCS

## 2019-11-13 PROCEDURE — 85384 FIBRINOGEN ACTIVITY: CPT

## 2019-11-13 PROCEDURE — 83615 LACTATE (LD) (LDH) ENZYME: CPT

## 2019-11-13 PROCEDURE — 74011250636 HC RX REV CODE- 250/636: Performed by: INTERNAL MEDICINE

## 2019-11-13 PROCEDURE — 83735 ASSAY OF MAGNESIUM: CPT

## 2019-11-13 PROCEDURE — 80053 COMPREHEN METABOLIC PANEL: CPT

## 2019-11-13 PROCEDURE — 65270000015 HC RM PRIVATE ONCOLOGY

## 2019-11-13 PROCEDURE — 74011250637 HC RX REV CODE- 250/637: Performed by: NURSE PRACTITIONER

## 2019-11-13 PROCEDURE — 86644 CMV ANTIBODY: CPT

## 2019-11-13 PROCEDURE — 99233 SBSQ HOSP IP/OBS HIGH 50: CPT | Performed by: INTERNAL MEDICINE

## 2019-11-13 PROCEDURE — 85730 THROMBOPLASTIN TIME PARTIAL: CPT

## 2019-11-13 PROCEDURE — 36430 TRANSFUSION BLD/BLD COMPNT: CPT

## 2019-11-13 RX ORDER — SODIUM CHLORIDE 9 MG/ML
250 INJECTION, SOLUTION INTRAVENOUS AS NEEDED
Status: DISCONTINUED | OUTPATIENT
Start: 2019-11-13 | End: 2019-11-18

## 2019-11-13 RX ORDER — LORATADINE 10 MG/1
10 TABLET ORAL DAILY
Status: DISCONTINUED | OUTPATIENT
Start: 2019-11-14 | End: 2019-12-12 | Stop reason: HOSPADM

## 2019-11-13 RX ADMIN — LORAZEPAM 1 MG: 1 TABLET ORAL at 21:48

## 2019-11-13 RX ADMIN — PREDNISOLONE ACETATE 2 DROP: 10 SUSPENSION/ DROPS OPHTHALMIC at 16:56

## 2019-11-13 RX ADMIN — ACETAMINOPHEN 650 MG: 325 TABLET, FILM COATED ORAL at 10:06

## 2019-11-13 RX ADMIN — ONDANSETRON 4 MG: 2 INJECTION INTRAMUSCULAR; INTRAVENOUS at 16:55

## 2019-11-13 RX ADMIN — Medication 400 MG: at 16:56

## 2019-11-13 RX ADMIN — PREDNISOLONE ACETATE 2 DROP: 10 SUSPENSION/ DROPS OPHTHALMIC at 05:30

## 2019-11-13 RX ADMIN — PRAVASTATIN SODIUM 10 MG: 20 TABLET ORAL at 21:48

## 2019-11-13 RX ADMIN — ALLOPURINOL 300 MG: 300 TABLET ORAL at 09:07

## 2019-11-13 RX ADMIN — SODIUM CHLORIDE 1000 ML: 900 INJECTION, SOLUTION INTRAVENOUS at 22:00

## 2019-11-13 RX ADMIN — ACYCLOVIR 400 MG: 800 TABLET ORAL at 09:07

## 2019-11-13 RX ADMIN — Medication 400 MG: at 09:08

## 2019-11-13 RX ADMIN — POTASSIUM & SODIUM PHOSPHATES POWDER PACK 280-160-250 MG 1 PACKET: 280-160-250 PACK at 09:07

## 2019-11-13 RX ADMIN — ONDANSETRON 4 MG: 2 INJECTION INTRAMUSCULAR; INTRAVENOUS at 21:54

## 2019-11-13 RX ADMIN — VORICONAZOLE 200 MG: 200 TABLET, FILM COATED ORAL at 21:48

## 2019-11-13 RX ADMIN — DEXAMETHASONE SODIUM PHOSPHATE 10 MG: 10 INJECTION, SOLUTION INTRAMUSCULAR; INTRAVENOUS at 12:10

## 2019-11-13 RX ADMIN — POTASSIUM & SODIUM PHOSPHATES POWDER PACK 280-160-250 MG 1 PACKET: 280-160-250 PACK at 16:56

## 2019-11-13 RX ADMIN — VORICONAZOLE 200 MG: 200 TABLET, FILM COATED ORAL at 09:08

## 2019-11-13 RX ADMIN — POTASSIUM & SODIUM PHOSPHATES POWDER PACK 280-160-250 MG 1 PACKET: 280-160-250 PACK at 21:48

## 2019-11-13 RX ADMIN — DIPHENHYDRAMINE HYDROCHLORIDE 25 MG: 25 CAPSULE ORAL at 10:06

## 2019-11-13 RX ADMIN — TBO-FILGRASTIM 480 MCG: 480 INJECTION, SOLUTION SUBCUTANEOUS at 21:48

## 2019-11-13 RX ADMIN — Medication 2 TABLET: at 09:14

## 2019-11-13 RX ADMIN — ONDANSETRON 4 MG: 2 INJECTION INTRAMUSCULAR; INTRAVENOUS at 05:26

## 2019-11-13 RX ADMIN — Medication 10 ML: at 21:54

## 2019-11-13 RX ADMIN — PREDNISOLONE ACETATE 2 DROP: 10 SUSPENSION/ DROPS OPHTHALMIC at 12:10

## 2019-11-13 RX ADMIN — LEVOFLOXACIN 500 MG: 500 TABLET, FILM COATED ORAL at 12:53

## 2019-11-13 RX ADMIN — DULOXETINE 30 MG: 30 CAPSULE, DELAYED RELEASE ORAL at 09:08

## 2019-11-13 RX ADMIN — SODIUM CHLORIDE 75 ML/HR: 900 INJECTION, SOLUTION INTRAVENOUS at 05:31

## 2019-11-13 RX ADMIN — ACYCLOVIR 400 MG: 800 TABLET ORAL at 16:55

## 2019-11-13 NOTE — PROGRESS NOTES
[facility] Hematology & Oncology Inpatient Hematology / Oncology Daily Progress Note Reason for Admission:  Admission for antineoplastic chemotherapy [Z51.11] 24 Hour Events: 
Afebrile, VSS Day 6 FLAG-VENITA Tolerating treatment well Sitting up in bedside chair Mild abdominal discomfort (improved from yesterday) and loose stools ROS: 
Constitutional: Negative for fever, chills, weakness, malaise, fatigue. CV: Negative for chest pain, palpitations, edema. Respiratory: Negative for dyspnea, cough, wheezing. GI: +abd pain, loose stools. Negative for nausea. 10 point review of systems is otherwise negative with the exception of the elements mentioned above in the HPI. No Known Allergies Past Medical History:  
Diagnosis Date  AML (acute myeloid leukemia) ([facility] [address] dx- 4/2019  
 followed by dr Laura Aguiar  Depression  Hypercholesterolemia  Infection   
 of port -- was placed 5/2019-- removed 6/2019---right chest  
 Psychiatric disorder   
 aniexty  Sleep apnea Past Surgical History:  
Procedure Laterality Date  HX OTHER SURGICAL    
 colonoscopy  HX VASCULAR ACCESS    
 IR INSERT TUNL CVC W PORT OVER 5 YEARS  4/30/2019  IR INSERT TUNL CVC W PORT OVER 5 YEARS  7/15/2019  IR REMOVE TUNL CVAD W PORT/PUMP  6/13/2019 Family History Problem Relation Age of Onset  Cancer Father Social History Socioeconomic History  Marital status:  Spouse name: Not on file  Number of children: Not on file  Years of education: Not on file  Highest education level: Not on file Occupational History  Not on file Social Needs  Financial resource strain: Not on file  Food insecurity:  
  Worry: Not on file Inability: Not on file  Transportation needs:  
  Medical: Not on file Non-medical: Not on file Tobacco Use  Smoking status: Never Smoker  Smokeless tobacco: Never Used Substance and Sexual Activity  Alcohol use: Never Frequency: Never  Drug use: Never  Sexual activity: Not on file Lifestyle  Physical activity:  
  Days per week: Not on file Minutes per session: Not on file  Stress: Not on file Relationships  Social connections:  
  Talks on phone: Not on file Gets together: Not on file Attends Restoration service: Not on file Active member of club or organization: Not on file Attends meetings of clubs or organizations: Not on file Relationship status: Not on file  Intimate partner violence:  
  Fear of current or ex partner: Not on file Emotionally abused: Not on file Physically abused: Not on file Forced sexual activity: Not on file Other Topics Concern [address] Service Not Asked  Blood Transfusions Not Asked  Caffeine Concern Not Asked  Occupational Exposure Not Asked [employer] Hazards Not Asked  Sleep Concern Not Asked  Stress Concern Not Asked  Weight Concern Not Asked  Special Diet Not Asked  Back Care Not Asked  Exercise Not Asked  Bike Helmet Not Asked  Seat Belt Not Asked  Self-Exams Not Asked Social History Narrative  Not on file Current Facility-Administered Medications Medication Dose Route Frequency Provider Last Rate Last Dose  
 0.9% sodium chloride infusion 250 mL  250 mL IntraVENous PRN Naa Zepeda MD      
 central line flush (saline) syringe 10 mL  10 mL InterCATHeter PRN Naa Zepeda MD   10 mL at 11/12/19 0031  
 docusate sodium (COLACE) capsule 100 mg  100 mg Oral BID PRN Malina Akers NP   100 mg at 11/11/19 1308  potassium, sodium phosphates (NEUTRA-PHOS) packet 1 Packet  1 Packet Oral TID Naa Zepeda MD   1 Packet at 11/13/19 2348  magnesium oxide (MAG-OX) tablet 400 mg  400 mg Oral BID Malina Akers NP   400 mg at 11/13/19 7226  dexamethasone (PF) (DECADRON) injection 10 mg  10 mg IntraVENous Q24H Naa Zepeda MD   10 mg at 11/13/19 1210  LORazepam (ATIVAN) injection 0.5 mg  0.5 mg IntraVENous Q6H PRN Maged Fofana MD      
 prednisoLONE acetate (PRED FORTE) 1 % ophthalmic suspension 2 Drop  2 Drop Both Eyes Q6H Maged Fofana MD   2 Drop at 11/13/19 1210  
 saline peripheral flush soln 10 mL  10 mL InterCATHeter PRN Maged Fofana MD   10 mL at 11/11/19 2143  heparin (porcine) pf 300-500 Units  300-500 Units InterCATHeter PRN Maged Fofana MD      
 tbo-filgrastim [facility]) injection 480 mcg  480 mcg SubCUTAneous QHS Nick Mooney MD      
 acyclovir (ZOVIRAX) tablet 400 mg  400 mg Oral BID Dorian Pepper, NP   400 mg at 11/13/19 6136  allopurinol (ZYLOPRIM) tablet 300 mg  300 mg Oral DAILY [place] Pepper, NP   300 mg at 11/13/19 6135  cholecalciferol (VITAMIN D3) (400 Units /10 mcg) tablet 2 Tab  800 Units Oral DAILY Dorian Pepper, NP   2 Tab at 11/13/19 5958  DULoxetine (CYMBALTA) capsule 30 mg  30 mg Oral DAILY Dorian Pepper, NP   30 mg at 11/13/19 5613  lidocaine-prilocaine (EMLA) 2.5-2.5 % cream   Topical PRN Dorian Pepper, NP      
 LORazepam (ATIVAN) tablet 1 mg  1 mg Oral QHS [place] Pepper, NP   1 mg at 11/12/19 2201  pravastatin (PRAVACHOL) tablet 10 mg  10 mg Oral QHS Dorian Pepper, NP   10 mg at 11/12/19 2201  voriconazole (VFEND) tablet 200 mg  200 mg Oral Q12H [place] Pepper, NP   200 mg at 11/13/19 0908  
 0.9% sodium chloride infusion  75 mL/hr IntraVENous CONTINUOUS [place] Pepper, NP 75 mL/hr at 11/13/19 0531 75 mL/hr at 11/13/19 0531  
 ondansetron (ZOFRAN) injection 4 mg  4 mg IntraVENous Q4H PRN Dorian Pepper, NP   4 mg at 11/13/19 0526  
 prochlorperazine (COMPAZINE) with saline injection 5 mg  5 mg IntraVENous Q6H PRN [place] Pepper, NP      
 HYDROcodone-acetaminophen [facility]) 5-325 mg per tablet 1 Tab  1 Tab Oral Q6H PRN [place] Pepper, NP   1 Tab at 11/11/19 7420  morphine injection 2 mg  2 mg IntraVENous Q4H PRN [place] Pepper, NP      
 acetaminophen (TYLENOL) tablet 650 mg  650 mg Oral Q6H PRN Maged Fofana, MD   650 mg at 19 1006  diphenhydrAMINE (BENADRYL) capsule 25 mg  25 mg Oral Q6H PRN Riki Allen MD   25 mg at 19 1006  vitamin a-vitamin c-vit e-min (OCUVITE) tablet 1 Tab (Patient Supplied)  1 Tab Oral DAILY Riki Allen MD      
 levoFLOXacin [facility]) tablet 500 mg  500 mg Oral Q24H [place]rapes, NP   500 mg at 19 1239  acetaminophen/diphenhydrAMINE (TYLENOL PM EXT STR) 500/25 mg (Patient Supplied)   Oral QHS Riki Allen MD      
 vit C,S-Mw-kdoom-lutein-zeaxan (PRESERVISION AREDS-2) capsule 1 Cap (Patient Supplied)  [address] Riki Allen MD   1 Cap at 19 9936 OBJECTIVE: 
Patient Vitals for the past 8 hrs: 
 BP Temp Pulse Resp SpO2  
19 1106 137/58 98.8 °F (37.1 °C) 93 18 96 % 19 1052 123/57 98.8 °F (37.1 °C) 87 18 99 % 19 0729 112/49 97.7 °F (36.5 °C) 70 18 100 % Temp (24hrs), Av.8 °F (37.1 °C), Min:97.7 °F (36.5 °C), Max:99.6 °F (37.6 °C) 
 
 0701 -  1900 In: -  
Out: 200 [Urine:200] Physical Exam: 
Constitutional: Well developed, well nourished female in no acute distress, sitting comfortably in bedside chair. HEENT: Normocephalic and atraumatic. Oropharynx is clear, mucous membranes are moist. Extraocular muscles are intact. Sclerae anicteric. Neck supple without JVD. No thyromegaly present. Skin Warm and dry. No rash noted. No erythema. No pallor. Bruising noted on BUE/R elbow and abdomen. Respiratory Lungs are clear to auscultation bilaterally without wheezes, rales or rhonchi, normal air exchange without accessory muscle use. CVS Normal rate, regular rhythm and normal S1 and S2. No murmurs, gallops, or rubs. Abdomen Soft, mildly tender across lower abd-improving, nondistended, normoactive bowel sounds. No palpable mass. No hepatosplenomegaly. Neuro Grossly nonfocal with no obvious sensory or motor deficits. MSK Normal range of motion in general.  No edema and no tenderness. Psych Appropriate mood and affect. Labs:   
Recent Results (from the past 24 hour(s)) METABOLIC PANEL, COMPREHENSIVE Collection Time: 11/13/19  3:18 AM  
Result Value Ref Range Sodium 141 136 - 145 mmol/L Potassium 4.4 3.5 - 5.1 mmol/L Chloride 109 (H) 98 - 107 mmol/L  
 CO2 28 21 - 32 mmol/L Anion gap 4 (L) 7 - 16 mmol/L Glucose 170 (H) 65 - 100 mg/dL BUN 22 8 - 23 MG/DL Creatinine 0.62 0.6 - 1.0 MG/DL  
 GFR est AA >60 >60 ml/min/1.73m2 GFR est non-AA >60 >60 ml/min/1.73m2 Calcium 7.6 (L) 8.3 - 10.4 MG/DL Bilirubin, total 0.4 0.2 - 1.1 MG/DL  
 ALT (SGPT) 26 12 - 65 U/L  
 AST (SGOT) 28 15 - 37 U/L Alk. phosphatase 66 50 - 136 U/L Protein, total 5.6 (L) 6.3 - 8.2 g/dL Albumin 2.3 (L) 3.2 - 4.6 g/dL Globulin 3.3 2.3 - 3.5 g/dL A-G Ratio 0.7 (L) 1.2 - 3.5 MAGNESIUM Collection Time: 11/13/19  3:18 AM  
Result Value Ref Range Magnesium 2.1 1.8 - 2.4 mg/dL LD Collection Time: 11/13/19  3:18 AM  
Result Value Ref Range  (H) 110 - 210 U/L  
URIC ACID Collection Time: 11/13/19  3:18 AM  
Result Value Ref Range Uric acid 2.8 2.6 - 6.0 MG/DL  
PHOSPHORUS Collection Time: 11/13/19  3:18 AM  
Result Value Ref Range Phosphorus 2.8 2.3 - 3.7 MG/DL PROTHROMBIN TIME + INR Collection Time: 11/13/19  3:18 AM  
Result Value Ref Range Prothrombin time 16.2 (H) 11.7 - 14.5 sec INR 1.3 PTT Collection Time: 11/13/19  3:18 AM  
Result Value Ref Range aPTT 27.6 24.7 - 39.8 SEC FIBRINOGEN Collection Time: 11/13/19  3:18 AM  
Result Value Ref Range Fibrinogen 208 190 - 501 mg/dL CBC WITH AUTOMATED DIFF Collection Time: 11/13/19  3:18 AM  
Result Value Ref Range WBC 0.0 (LL) 4.3 - 11.1 K/uL  
 RBC 2.19 (L) 4.05 - 5.2 M/uL HGB 6.6 (LL) 11.7 - 15.4 g/dL HCT 19.0 (LL) 35.8 - 46.3 % MCV 86.8 79.6 - 97.8 FL  
 MCH 30.1 26.1 - 32.9 PG  
 MCHC 34.7 31.4 - 35.0 g/dL  
 RDW 12.9 11.9 - 14.6 % PLATELET 16 (LL) 366 - 450 K/uL MPV 11.0 9.4 - 12.3 FL ABSOLUTE NRBC 0.00 0.0 - 0.2 K/uL  
 RBC COMMENTS NORMOCYTIC/NORMOCHROMIC    
 WBC COMMENTS WBC TOO FEW TO DIFFERENTIATE PLATELET COMMENTS MARKED    
 DF MANUAL Imaging: XR ELBOW RT MIN 3 V [[id number]] Collected: 11/10/19 1421 Order Status: Completed Updated: 11/10/19 1424 Narrative:    
Right elbow Clinical location: Elbow pain after feeling a pop, decreased range of motion FINDINGS: Four views of the right elbow show no fracture or dislocation. There 
is no joint effusion. The soft tissues are unremarkable. Impression:    
IMPRESSION: No acute osseous abnormality or joint derangement of the right 
elbow. ASSESSMENT: 
Problem List  Date Reviewed: 10/31/2019 Codes Class Noted Febrile neutropenia (HCC) ICD-10-CM: D70.9, R50.81 ICD-9-CM: 288.00, 780.61  9/21/2019 Pancytopenia due to antineoplastic chemotherapy [facility]) ICD-10-CM: D61.810, T45.1X5A 
ICD-9-CM: 284.11, E933.1  6/12/2019 Cellulitis of neck ICD-10-CM: J44.025 ICD-9-CM: 682.1  6/12/2019 Immunocompromised status associated with infection ICD-10-CM: B99.9 ICD-9-CM: 136.9  6/12/2019 Port or reservoir infection ICD-10-CM: P68.296L ICD-9-CM: 999.33  6/12/2019 Acute myeloid leukemia not having achieved remission ([facility] ICD-10-CM: C92.00 ICD-9-CM: 205.00  5/9/2019 Admission for antineoplastic chemotherapy ICD-10-CM: Z51.11 ICD-9-CM: V58.11  5/5/2019 AML (acute myeloblastic leukemia) ([facility] [address] ICD-10-CM: C92.00 ICD-9-CM: 205.00  4/28/2019 Weakness generalized ICD-10-CM: R53.1 ICD-9-CM: 780.79  4/28/2019 Pancytopenia ([facility] ICD-10-CM: S73.054 ICD-9-CM: 284.19  4/28/2019 Thrombocytopenia ([facility] [address] ICD-10-CM: D69.6 ICD-9-CM: 287.5  4/27/2019 Ms. Milana Douglas is a 68 y.o. female admitted on 11/7/2019. She is a known patient of Dr. 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 She failed induction with 7+3/Midostaurin. On Dacogen/Gilteritinib with recent BMbx with persistent residual disease. She is being admitted for FLAG-VENITA. She is feeling well and is ready to proceed with treatment. PLAN: 
AML 
- admit for salvage FLAG-VENITA 
- needs Echo prior - ordered 11/8 Day 1 FLAG-VENITA. Echo with EF 55-60%, proceed with tx. 
11/9 Day 2 FLAG-VENITA. Tolerating well, no issues. Uric acid 3.1 today. 11/10 Day 3 FLAG-VENITA. No issues with treatment. Uric acid 3.3 today. 11/12 Day 5 FLAG-VENITA. Tolerating treatment well. G-CSF to start tomorrow. 11/13 Day 6 FLAG-VENITA, doing well, Granix/claritin starts today Pancytopenia secondary to disease - Transfuse prn per [facility] SOPs R elbow pain 11/11 c/o R elbow pain with limited ROM yesterday. Xray neg. Pain improved today, noted bruising. Normal ROM on exam. 
 
Mild Abd discomfort/loose stools 11/13 discomfort is improved from yesterday, will monitor Continue home meds Prophylactic Antibx: Acyclovir, Voriconazole, Levaquin [facility] SOPs SCDs for DVT prophylaxis (AC contraindicated d/t thrombocytopenia) Disposition:  Will need to stay during count recovery after completion of chemotherapy. Expect hospitalization for several weeks. Upon discharge will need twice weekly labs w transfusions as needed. Weekly provider visits. RTC within 1 week from discharge. Bernadine Kelly NP [facility] Hematology & Oncology [address] [place] [address] Office : [phone] Fax : [phone] Attending Addendum: 
I have personally performed a face to face diagnostic evaluation on this patient. I have reviewed and agree with the care plan as documented above by Chio Tom N.P.  My findings are as follows: Patient appears stable, heart rate regular without murmurs, abdomen is non-tender, bowel sounds are positive.   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 GCSF support started. Ongoing pancytopenia, transfuse blood products as needed. Right elbow discomfort with x-rays negative. Clinically improving per patient. Continue prophylactic antibiotics including Levaquin, acyclovir and voriconazole. We will plan to keep in-house to hopeful counts recovery. Ambulation encouraged Total time 35 min 50% in direct consultation about the patient's diagnosis and management Nannie Merlin, MD 
[facility] Hematology/Oncology [address] [place] [address] Office : [phone] Fax : [phone]

## 2019-11-13 NOTE — PROGRESS NOTES
END OF SHIFT NOTE: 
 
Intake/Output 11/13 0701 - 11/13 1900 In: 553.3 [P.O.:240] Out: 1700 [Urine:1700] Voiding: YES Catheter: NO 
Drain:   
 
 
 
 
 
Stool:  0 occurrences. Stool Assessment Stool Color: Black; Magalys Ingles (11/13/19 1005) Stool Appearance: Soft (11/13/19 1005) Stool Amount: Small (11/13/19 1005) Stool Source/Status: Rectum (11/13/19 1005) Emesis:  0 occurrences. VITAL SIGNS Patient Vitals for the past 12 hrs: 
 Temp Pulse Resp BP SpO2  
11/13/19 1603 98 °F (36.7 °C) 69 18 158/73 98 % 11/13/19 1106 98.8 °F (37.1 °C) 93 18 137/58 96 % 11/13/19 1052 98.8 °F (37.1 °C) 87 18 123/57 99 % 11/13/19 0729 97.7 °F (36.5 °C) 70 18 112/49 100 % Pain Assessment Pain 1 Pain Scale 1: Numeric (0 - 10) (11/13/19 1300) Pain Intensity 1: 0 (11/13/19 1300) Patient Stated Pain Goal: 0 (11/13/19 1300) Pain Reassessment 1: Patient resting w/respiratory rate greater than 10 (11/13/19 0250) Pain Onset 1: suddenly (11/10/19 1000) Pain Location 1: Leg (11/11/19 0505) Pain Orientation 1: Left;Right (11/11/19 0505) Pain Description 1: Aching (11/11/19 0505) Pain Intervention(s) 1: Medication (see MAR) (11/11/19 0505) Ambulating Yes Additional Information: Pt received 1U of blood today. VSS. No needs stated at this time. Shift report given to oncoming nurse at the bedside. Ila Molina

## 2019-11-13 NOTE — PROGRESS NOTES
Problem: Falls - Risk of 
Goal: *Absence of Falls Description Document Aleksandar Nevarez Fall Risk and appropriate interventions in the flowsheet. Outcome: Progressing Towards Goal 
Note:  
Fall Risk Interventions: 
Mobility Interventions: Communicate number of staff needed for ambulation/transfer Medication Interventions: Patient to call before getting OOB Elimination Interventions: Call light in reach History of Falls Interventions: Consult care management for discharge planning

## 2019-11-13 NOTE — PROGRESS NOTES
Problem: Chemotherapy, Day 1 Goal: Activity/Safety Outcome: Progressing Towards Goal 
Goal: Consults, if ordered Outcome: Progressing Towards Goal 
Goal: Diagnostic Test/Procedures Outcome: Progressing Towards Goal 
Goal: Nutrition/Diet Outcome: Progressing Towards Goal 
Goal: Discharge Planning Outcome: Progressing Towards Goal 
Goal: Medications Outcome: Progressing Towards Goal 
Goal: Respiratory Outcome: Progressing Towards Goal 
Goal: Treatments/Interventions/Procedures Outcome: Progressing Towards Goal 
Goal: Psychosocial 
Outcome: Progressing Towards Goal 
Goal: *Optimal pain control at patient's stated goal 
Outcome: Progressing Towards Goal 
Goal: *Hemodynamically stable Outcome: Progressing Towards Goal 
Goal: *Adequate oxygenation Outcome: Progressing Towards Goal 
Goal: *Chemotherapy regimen is initiated Outcome: Progressing Towards Goal 
Goal: *Patient and family verbalize understanding of plan of care Outcome: Progressing Towards Goal 
  
Problem: Falls - Risk of 
Goal: *Absence of Falls Description Document Guadalupe Khan Fall Risk and appropriate interventions in the flowsheet. Outcome: Progressing Towards Goal 
Note:  
Fall Risk Interventions: 
Mobility Interventions: Communicate number of staff needed for ambulation/transfer, Patient to call before getting OOB Medication Interventions: Assess postural VS orthostatic hypotension, Patient to call before getting OOB, Teach patient to arise slowly Elimination Interventions: Call light in reach, Patient to call for help with toileting needs History of Falls Interventions: Door open when patient unattended, Room close to nurse's station Problem: Patient Education: Go to Patient Education Activity Goal: Patient/Family Education Outcome: Progressing Towards Goal

## 2019-11-13 NOTE — PROGRESS NOTES
END OF SHIFT NOTE: 
 
Intake/Output 11/12 1901 - 11/13 0700 In: 1781.4 [P.O.:430; I.V.:1351.4] Out: 2300 [OIRWT:0665] Voiding: YES Catheter: NO 
Drain:   
 
 
 
 
 
Stool:  1 occurrences. Stool Assessment Stool Color: Piyushonza Senters (11/12/19 1138) Stool Appearance: Soft (11/12/19 1955) Stool Amount: Medium (11/12/19 1138) Stool Source/Status: Rectum (11/12/19 1138) Emesis:  0 occurrences. VITAL SIGNS Patient Vitals for the past 12 hrs: 
 Temp Pulse Resp BP SpO2  
11/13/19 0313 98.6 °F (37 °C) 63 18 150/66 96 % 11/12/19 2323 98.9 °F (37.2 °C) 64 18 178/72 96 % 11/12/19 1952 99.2 °F (37.3 °C) 90 18 143/64 96 % Pain Assessment Pain 1 Pain Scale 1: Numeric (0 - 10) (11/13/19 0250) Pain Intensity 1: 0 (11/13/19 0250) Patient Stated Pain Goal: 0 (11/13/19 0250) Pain Reassessment 1: Patient resting w/respiratory rate greater than 10 (11/13/19 0250) Pain Onset 1: suddenly (11/10/19 1000) Pain Location 1: Leg (11/11/19 0505) Pain Orientation 1: Left;Right (11/11/19 0505) Pain Description 1: Aching (11/11/19 0505) Pain Intervention(s) 1: Medication (see MAR) (11/11/19 0505) Ambulating Yes Additional Information: VSS. Afebrile. Day 5 of FLAG-VENITA completed and tolerated well. Now to wait for count recovery. Zofran given 1x. No needs voiced at this time. Shift report given to oncoming nurse at the bedside.  
 
Yoav Duran, RN

## 2019-11-13 NOTE — PROGRESS NOTES
Day 5 chemo FLAG-VENITA Last dose of chemo IV Cytarabine infused as ordered;monitored per protocol;good blood return from port. Tolerated well w/ no untoward reactions;no new neuro changes noted/reported.

## 2019-11-14 LAB
ABO + RH BLD: NORMAL
ALBUMIN SERPL-MCNC: 2.4 G/DL (ref 3.2–4.6)
ALBUMIN/GLOB SERPL: 0.9 {RATIO} (ref 1.2–3.5)
ALP SERPL-CCNC: 59 U/L (ref 50–136)
ALT SERPL-CCNC: 26 U/L (ref 12–65)
ANION GAP SERPL CALC-SCNC: 5 MMOL/L (ref 7–16)
APTT PPP: 25.2 SEC (ref 24.7–39.8)
AST SERPL-CCNC: 25 U/L (ref 15–37)
BILIRUB SERPL-MCNC: 0.3 MG/DL (ref 0.2–1.1)
BLD PROD TYP BPU: NORMAL
BLOOD GROUP ANTIBODIES SERPL: NORMAL
BPU ID: NORMAL
BUN SERPL-MCNC: 26 MG/DL (ref 8–23)
CALCIUM SERPL-MCNC: 7.5 MG/DL (ref 8.3–10.4)
CHLORIDE SERPL-SCNC: 108 MMOL/L (ref 98–107)
CO2 SERPL-SCNC: 29 MMOL/L (ref 21–32)
CREAT SERPL-MCNC: 0.63 MG/DL (ref 0.6–1)
CROSSMATCH RESULT,%XM: NORMAL
DIFFERENTIAL METHOD BLD: ABNORMAL
ERYTHROCYTE [DISTWIDTH] IN BLOOD BY AUTOMATED COUNT: 12.8 % (ref 11.9–14.6)
FIBRINOGEN PPP-MCNC: 242 MG/DL (ref 190–501)
GLOBULIN SER CALC-MCNC: 2.8 G/DL (ref 2.3–3.5)
GLUCOSE SERPL-MCNC: 176 MG/DL (ref 65–100)
HCT VFR BLD AUTO: 19.4 % (ref 35.8–46.3)
HGB BLD-MCNC: 6.8 G/DL (ref 11.7–15.4)
INR PPP: 1.3
LDH SERPL L TO P-CCNC: 398 U/L (ref 110–210)
MAGNESIUM SERPL-MCNC: 2.1 MG/DL (ref 1.8–2.4)
MCH RBC QN AUTO: 30.2 PG (ref 26.1–32.9)
MCHC RBC AUTO-ENTMCNC: 35.1 G/DL (ref 31.4–35)
MCV RBC AUTO: 86.2 FL (ref 79.6–97.8)
NRBC # BLD: 0 K/UL (ref 0–0.2)
PHOSPHATE SERPL-MCNC: 2.6 MG/DL (ref 2.3–3.7)
PLATELET # BLD AUTO: 8 K/UL (ref 150–450)
PLATELET COMMENTS,PCOM: ABNORMAL
PMV BLD AUTO: 10.3 FL (ref 9.4–12.3)
POTASSIUM SERPL-SCNC: 4.4 MMOL/L (ref 3.5–5.1)
PROT SERPL-MCNC: 5.2 G/DL (ref 6.3–8.2)
PROTHROMBIN TIME: 16.7 SEC (ref 11.7–14.5)
RBC # BLD AUTO: 2.25 M/UL (ref 4.05–5.2)
RBC MORPH BLD: ABNORMAL
RBC MORPH BLD: ABNORMAL
SODIUM SERPL-SCNC: 142 MMOL/L (ref 136–145)
SPECIMEN EXP DATE BLD: NORMAL
STATUS OF UNIT,%ST: NORMAL
UNIT DIVISION, %UDIV: 0
URATE SERPL-MCNC: 2.6 MG/DL (ref 2.6–6)
WBC # BLD AUTO: 0 K/UL (ref 4.3–11.1)
WBC MORPH BLD: ABNORMAL

## 2019-11-14 PROCEDURE — 74011250637 HC RX REV CODE- 250/637: Performed by: NURSE PRACTITIONER

## 2019-11-14 PROCEDURE — 85730 THROMBOPLASTIN TIME PARTIAL: CPT

## 2019-11-14 PROCEDURE — 74011250636 HC RX REV CODE- 250/636: Performed by: NURSE PRACTITIONER

## 2019-11-14 PROCEDURE — 86900 BLOOD TYPING SEROLOGIC ABO: CPT

## 2019-11-14 PROCEDURE — 80053 COMPREHEN METABOLIC PANEL: CPT

## 2019-11-14 PROCEDURE — 86923 COMPATIBILITY TEST ELECTRIC: CPT

## 2019-11-14 PROCEDURE — 85025 COMPLETE CBC W/AUTO DIFF WBC: CPT

## 2019-11-14 PROCEDURE — 85384 FIBRINOGEN ACTIVITY: CPT

## 2019-11-14 PROCEDURE — 83615 LACTATE (LD) (LDH) ENZYME: CPT

## 2019-11-14 PROCEDURE — 74011250637 HC RX REV CODE- 250/637: Performed by: INTERNAL MEDICINE

## 2019-11-14 PROCEDURE — 86644 CMV ANTIBODY: CPT

## 2019-11-14 PROCEDURE — 85610 PROTHROMBIN TIME: CPT

## 2019-11-14 PROCEDURE — 83735 ASSAY OF MAGNESIUM: CPT

## 2019-11-14 PROCEDURE — 36430 TRANSFUSION BLD/BLD COMPNT: CPT

## 2019-11-14 PROCEDURE — P9037 PLATE PHERES LEUKOREDU IRRAD: HCPCS

## 2019-11-14 PROCEDURE — 84100 ASSAY OF PHOSPHORUS: CPT

## 2019-11-14 PROCEDURE — P9040 RBC LEUKOREDUCED IRRADIATED: HCPCS

## 2019-11-14 PROCEDURE — 99233 SBSQ HOSP IP/OBS HIGH 50: CPT | Performed by: INTERNAL MEDICINE

## 2019-11-14 PROCEDURE — 74011250636 HC RX REV CODE- 250/636: Performed by: INTERNAL MEDICINE

## 2019-11-14 PROCEDURE — 65270000029 HC RM PRIVATE

## 2019-11-14 PROCEDURE — 84550 ASSAY OF BLOOD/URIC ACID: CPT

## 2019-11-14 PROCEDURE — 77030020263 HC SOL INJ SOD CL0.9% LFCR 1000ML

## 2019-11-14 PROCEDURE — 65270000015 HC RM PRIVATE ONCOLOGY

## 2019-11-14 RX ORDER — LOPERAMIDE HYDROCHLORIDE 2 MG/1
2 CAPSULE ORAL
Status: DISCONTINUED | OUTPATIENT
Start: 2019-11-14 | End: 2019-12-12 | Stop reason: HOSPADM

## 2019-11-14 RX ORDER — SODIUM CHLORIDE 9 MG/ML
250 INJECTION, SOLUTION INTRAVENOUS AS NEEDED
Status: DISCONTINUED | OUTPATIENT
Start: 2019-11-14 | End: 2019-11-18

## 2019-11-14 RX ADMIN — PREDNISOLONE ACETATE 2 DROP: 10 SUSPENSION/ DROPS OPHTHALMIC at 11:40

## 2019-11-14 RX ADMIN — DIPHENHYDRAMINE HYDROCHLORIDE 25 MG: 25 CAPSULE ORAL at 10:13

## 2019-11-14 RX ADMIN — ONDANSETRON 4 MG: 2 INJECTION INTRAMUSCULAR; INTRAVENOUS at 21:43

## 2019-11-14 RX ADMIN — LORAZEPAM 1 MG: 1 TABLET ORAL at 21:43

## 2019-11-14 RX ADMIN — PREDNISOLONE ACETATE 2 DROP: 10 SUSPENSION/ DROPS OPHTHALMIC at 17:04

## 2019-11-14 RX ADMIN — PREDNISOLONE ACETATE 2 DROP: 10 SUSPENSION/ DROPS OPHTHALMIC at 00:05

## 2019-11-14 RX ADMIN — ONDANSETRON 4 MG: 2 INJECTION INTRAMUSCULAR; INTRAVENOUS at 17:04

## 2019-11-14 RX ADMIN — LEVOFLOXACIN 500 MG: 500 TABLET, FILM COATED ORAL at 11:41

## 2019-11-14 RX ADMIN — Medication 400 MG: at 17:04

## 2019-11-14 RX ADMIN — SODIUM CHLORIDE 1000 ML: 900 INJECTION, SOLUTION INTRAVENOUS at 21:50

## 2019-11-14 RX ADMIN — ACYCLOVIR 400 MG: 800 TABLET ORAL at 08:27

## 2019-11-14 RX ADMIN — DULOXETINE 30 MG: 30 CAPSULE, DELAYED RELEASE ORAL at 08:27

## 2019-11-14 RX ADMIN — TBO-FILGRASTIM 480 MCG: 480 INJECTION, SOLUTION SUBCUTANEOUS at 21:49

## 2019-11-14 RX ADMIN — POTASSIUM & SODIUM PHOSPHATES POWDER PACK 280-160-250 MG 1 PACKET: 280-160-250 PACK at 17:04

## 2019-11-14 RX ADMIN — LOPERAMIDE HYDROCHLORIDE 2 MG: 2 CAPSULE ORAL at 10:13

## 2019-11-14 RX ADMIN — POTASSIUM & SODIUM PHOSPHATES POWDER PACK 280-160-250 MG 1 PACKET: 280-160-250 PACK at 08:27

## 2019-11-14 RX ADMIN — DEXAMETHASONE SODIUM PHOSPHATE 10 MG: 10 INJECTION, SOLUTION INTRAMUSCULAR; INTRAVENOUS at 11:40

## 2019-11-14 RX ADMIN — LOPERAMIDE HYDROCHLORIDE 2 MG: 2 CAPSULE ORAL at 14:50

## 2019-11-14 RX ADMIN — PREDNISOLONE ACETATE 2 DROP: 10 SUSPENSION/ DROPS OPHTHALMIC at 21:50

## 2019-11-14 RX ADMIN — DIPHENHYDRAMINE HYDROCHLORIDE 25 MG: 25 CAPSULE ORAL at 21:43

## 2019-11-14 RX ADMIN — LOPERAMIDE HYDROCHLORIDE 2 MG: 2 CAPSULE ORAL at 21:43

## 2019-11-14 RX ADMIN — VORICONAZOLE 200 MG: 200 TABLET, FILM COATED ORAL at 08:27

## 2019-11-14 RX ADMIN — PRAVASTATIN SODIUM 10 MG: 20 TABLET ORAL at 21:43

## 2019-11-14 RX ADMIN — LORATADINE 10 MG: 10 TABLET ORAL at 08:27

## 2019-11-14 RX ADMIN — ACYCLOVIR 400 MG: 800 TABLET ORAL at 17:04

## 2019-11-14 RX ADMIN — POTASSIUM & SODIUM PHOSPHATES POWDER PACK 280-160-250 MG 1 PACKET: 280-160-250 PACK at 21:43

## 2019-11-14 RX ADMIN — PREDNISOLONE ACETATE 2 DROP: 10 SUSPENSION/ DROPS OPHTHALMIC at 06:09

## 2019-11-14 RX ADMIN — ACETAMINOPHEN 650 MG: 325 TABLET, FILM COATED ORAL at 10:13

## 2019-11-14 RX ADMIN — ALLOPURINOL 300 MG: 300 TABLET ORAL at 08:27

## 2019-11-14 RX ADMIN — Medication 400 MG: at 08:27

## 2019-11-14 RX ADMIN — VORICONAZOLE 200 MG: 200 TABLET, FILM COATED ORAL at 21:49

## 2019-11-14 RX ADMIN — ONDANSETRON 4 MG: 2 INJECTION INTRAMUSCULAR; INTRAVENOUS at 07:21

## 2019-11-14 RX ADMIN — Medication 2 TABLET: at 08:27

## 2019-11-14 NOTE — PROGRESS NOTES
Problem: Chemotherapy, Day 1 Goal: Activity/Safety Outcome: Progressing Towards Goal 
Goal: Consults, if ordered Outcome: Progressing Towards Goal 
Goal: Diagnostic Test/Procedures Outcome: Progressing Towards Goal 
Goal: Nutrition/Diet Outcome: Progressing Towards Goal 
Goal: Discharge Planning Outcome: Progressing Towards Goal 
Goal: Medications Outcome: Progressing Towards Goal 
Goal: Respiratory Outcome: Progressing Towards Goal 
Goal: Treatments/Interventions/Procedures Outcome: Progressing Towards Goal 
Goal: Psychosocial 
Outcome: Progressing Towards Goal 
Goal: *Optimal pain control at patient's stated goal 
Outcome: Progressing Towards Goal 
Goal: *Hemodynamically stable Outcome: Progressing Towards Goal 
Goal: *Adequate oxygenation Outcome: Progressing Towards Goal 
Goal: *Chemotherapy regimen is initiated Outcome: Progressing Towards Goal 
Goal: *Patient and family verbalize understanding of plan of care Outcome: Progressing Towards Goal 
  
Problem: Falls - Risk of 
Goal: *Absence of Falls Description Document Mariel Mckeons Fall Risk and appropriate interventions in the flowsheet. Outcome: Progressing Towards Goal 
Note:  
Fall Risk Interventions: 
Mobility Interventions: Communicate number of staff needed for ambulation/transfer, Patient to call before getting OOB Medication Interventions: Assess postural VS orthostatic hypotension, Patient to call before getting OOB Elimination Interventions: Call light in reach, Patient to call for help with toileting needs History of Falls Interventions: Room close to nurse's station Problem: Patient Education: Go to Patient Education Activity Goal: Patient/Family Education Outcome: Progressing Towards Goal

## 2019-11-14 NOTE — PROGRESS NOTES
[facility] Hematology & Oncology Inpatient Hematology / Oncology Daily Progress Note Reason for Admission:  Admission for antineoplastic chemotherapy [Z51.11] 24 Hour Events: 
Afebrile, VSS Day 7 FLAG-VENITA Fatigued today Reports 10 loose stools-pudding consistency Abdominal discomfort continue to improve ROS: 
Constitutional: +fatigue Negative for fever, chills, weakness, malaise. CV: Negative for chest pain, palpitations, edema. Respiratory: Negative for dyspnea, cough, wheezing. GI: +abd pain, loose stools. Negative for nausea. 10 point review of systems is otherwise negative with the exception of the elements mentioned above in the HPI. No Known Allergies Past Medical History:  
Diagnosis Date  AML (acute myeloid leukemia) ([facility] [place] [address] dx- 4/2019  
 followed by dr Moe Lindquist  Depression  Hypercholesterolemia  Infection   
 of port -- was placed 5/2019-- removed 6/2019---right chest  
 Psychiatric disorder   
 aniexty  Sleep apnea Past Surgical History:  
Procedure Laterality Date  HX OTHER SURGICAL    
 colonoscopy  HX VASCULAR ACCESS    
 IR INSERT TUNL CVC W PORT OVER 5 YEARS  4/30/2019  IR INSERT TUNL CVC W PORT OVER 5 YEARS  7/15/2019  IR REMOVE TUNL CVAD W PORT/PUMP  6/13/2019 Family History Problem Relation Age of Onset  Cancer Father Social History Socioeconomic History  Marital status:  Spouse name: Not on file  Number of children: Not on file  Years of education: Not on file  Highest education level: Not on file Occupational History  Not on file Social Needs  Financial resource strain: Not on file  Food insecurity:  
  Worry: Not on file Inability: Not on file  Transportation needs:  
  Medical: Not on file Non-medical: Not on file Tobacco Use  Smoking status: Never Smoker  Smokeless tobacco: Never Used Substance and Sexual Activity  Alcohol use: Never Frequency: Never  Drug use: Never  Sexual activity: Not on file Lifestyle  Physical activity:  
  Days per week: Not on file Minutes per session: Not on file  Stress: Not on file Relationships  Social connections:  
  Talks on phone: Not on file Gets together: Not on file Attends Mandaeism service: Not on file Active member of club or organization: Not on file Attends meetings of clubs or organizations: Not on file Relationship status: Not on file  Intimate partner violence:  
  Fear of current or ex partner: Not on file Emotionally abused: Not on file Physically abused: Not on file Forced sexual activity: Not on file Other Topics Concern [address] Service Not Asked  Blood Transfusions Not Asked  Caffeine Concern Not Asked  Occupational Exposure Not Asked Carrington Moros Hazards Not Asked  Sleep Concern Not Asked  Stress Concern Not Asked  Weight Concern Not Asked  Special Diet Not Asked  Back Care Not Asked  Exercise Not Asked  Bike Helmet Not Asked  Seat Belt Not Asked  Self-Exams Not Asked Social History Narrative  Not on file Current Facility-Administered Medications Medication Dose Route Frequency Provider Last Rate Last Dose  
 0.9% sodium chloride infusion 250 mL  250 mL IntraVENous PRN Dora Horner MD      
 loperamide (IMODIUM) capsule 2 mg  2 mg Oral Q4H PRN Dora Horner MD   2 mg at 11/14/19 1013  
 0.9% sodium chloride infusion 250 mL  250 mL IntraVENous PRN Dora Horner MD      
 loratadine (CLARITIN) tablet 10 mg  10 mg Oral DAILY Simón Cm NP   10 mg at 11/14/19 0827  
 sodium chloride 0.9 % bolus infusion 1,000 mL  1,000 mL IntraVENous QHS Simón Cm NP   1,000 mL at 11/13/19 2200  central line flush (saline) syringe 10 mL  10 mL InterCATHeter PRN Dora Horner MD   10 mL at 11/12/19 0031  
 docusate sodium (COLACE) capsule 100 mg  100 mg Oral BID PRN Jordan Contreras Dania Milian NP   100 mg at 11/11/19 1308  potassium, sodium phosphates (NEUTRA-PHOS) packet 1 Packet  1 Packet Oral TID Clare Cook MD   1 Packet at 11/14/19 0827  
 magnesium oxide (MAG-OX) tablet 400 mg  400 mg Oral BID Azra Angst, NP   400 mg at 11/14/19 0827  
 dexamethasone (PF) (DECADRON) injection 10 mg  10 mg IntraVENous Q24H Clare Cook MD   10 mg at 11/14/19 1140  LORazepam (ATIVAN) injection 0.5 mg  0.5 mg IntraVENous Q6H PRN Clare Cook MD      
 prednisoLONE acetate (PRED FORTE) 1 % ophthalmic suspension 2 Drop  2 Drop Both Eyes Q6H Clare Cook MD   2 Drop at 11/14/19 1140  
 saline peripheral flush soln 10 mL  10 mL InterCATHeter PRN Clare Cook MD   10 mL at 11/13/19 2154  heparin (porcine) pf 300-500 Units  300-500 Units InterCATHeter PRN Clare Cook MD      
 tbo-filgrastim [facility]) injection 480 mcg  480 mcg SubCUTAneous QHS Clare Cook MD   480 mcg at 11/13/19 2148  acyclovir (ZOVIRAX) tablet 400 mg  400 mg Oral BID Azra Angst, NP   400 mg at 11/14/19 0827  
 allopurinol (ZYLOPRIM) tablet 300 mg  300 mg Oral DAILY Azra Angst, NP   300 mg at 11/14/19 2555  cholecalciferol (VITAMIN D3) (400 Units /10 mcg) tablet 2 Tab  800 Units Oral DAILY Azra Angst, NP   2 Tab at 11/14/19 5349  DULoxetine (CYMBALTA) capsule 30 mg  30 mg Oral DAILY Azra Angst, NP   30 mg at 11/14/19 4358  lidocaine-prilocaine (EMLA) 2.5-2.5 % cream   Topical PRN Azra Angst, NP      
 LORazepam (ATIVAN) tablet 1 mg  1 mg Oral QHS Azra Angst, NP   1 mg at 11/13/19 2148  pravastatin (PRAVACHOL) tablet 10 mg  10 mg Oral QHS Azra Angst, NP   10 mg at 11/13/19 2148  voriconazole (VFEND) tablet 200 mg  200 mg Oral Q12H Azra Angst, NP   200 mg at 11/14/19 0827  
 ondansetron (ZOFRAN) injection 4 mg  4 mg IntraVENous Q4H PRN Azra Angst, NP   4 mg at 11/14/19 0721  
 prochlorperazine (COMPAZINE) with saline injection 5 mg  5 mg IntraVENous Q6H PRN Azra Angst, NP      
  HYDROcodone-acetaminophen (NORCO) 5-325 mg per tablet 1 Tab  1 Tab Oral Q6H PRN Algie Reins, NP   1 Tab at 19 3841  morphine injection 2 mg  2 mg IntraVENous Q4H PRN Lia Larsen, NP      
 acetaminophen (TYLENOL) tablet 650 mg  650 mg Oral Q6H PRN Gregorio Lorenzo MD   650 mg at 19 1013  diphenhydrAMINE (BENADRYL) capsule 25 mg  25 mg Oral Q6H PRN Gregorio Lorenzo MD   25 mg at 19 1013  
 vitamin a-vitamin c-vit e-min (OCUVITE) tablet 1 Tab (Patient Supplied)  1 Tab Oral DAILY Gregorio Lorenzo MD      
 levoFLOXacin [facility]) tablet 500 mg  500 mg Oral Q24H Lia Larsen, NP   500 mg at 19 1141  acetaminophen/diphenhydrAMINE (TYLENOL PM EXT STR) 500/25 mg (Patient Supplied)   Oral QHS Gregorio Lorenzo MD      
 vit C,O-Jn-doepx-lutein-zeaxan (PRESERVISION AREDS-2) capsule 1 Cap (Patient Supplied)  [address] Gregorio Lorenzo MD   1 Cap at 19 9536 OBJECTIVE: 
Patient Vitals for the past 8 hrs: 
 BP Temp Pulse Resp SpO2  
19 1213 139/66 98.8 °F (37.1 °C) 73 20 99 % 19 1106 120/56 99 °F (37.2 °C) 79 18 97 % 19 1048 163/68 98.7 °F (37.1 °C) 95 18 96 % 19 0739 139/69 98.7 °F (37.1 °C) 67 18 98 % Temp (24hrs), Av.7 °F (37.1 °C), Min:98 °F (36.7 °C), Max:99.1 °F (37.3 °C) 
 
 07 -  1900 In: 80 [P.O.:600] Out: 600 [Urine:600] Physical Exam: 
Constitutional: Well developed, well nourished female in no acute distress, sitting comfortably in hospital bed. HEENT: Normocephalic and atraumatic. Oropharynx is clear, mucous membranes are moist. Extraocular muscles are intact. Sclerae anicteric. Neck supple without JVD. No thyromegaly present. Skin Warm and dry. No rash noted. No erythema. No pallor. Bruising noted on BUE/R elbow and abdomen. +blood blister to lower right lip Respiratory Lungs are clear to auscultation bilaterally without wheezes, rales or rhonchi, normal air exchange without accessory muscle use. CVS Normal rate, regular rhythm and normal S1 and S2. No murmurs, gallops, or rubs. Abdomen Soft, mildly tender across lower abd-improving, nondistended, normoactive bowel sounds. No palpable mass. No hepatosplenomegaly. Neuro Grossly nonfocal with no obvious sensory or motor deficits. MSK Normal range of motion in general.  No edema and no tenderness. Psych Appropriate mood and affect. Labs:   
Recent Results (from the past 24 hour(s)) METABOLIC PANEL, COMPREHENSIVE Collection Time: 11/14/19  3:03 AM  
Result Value Ref Range Sodium 142 136 - 145 mmol/L Potassium 4.4 3.5 - 5.1 mmol/L Chloride 108 (H) 98 - 107 mmol/L  
 CO2 29 21 - 32 mmol/L Anion gap 5 (L) 7 - 16 mmol/L Glucose 176 (H) 65 - 100 mg/dL BUN 26 (H) 8 - 23 MG/DL Creatinine 0.63 0.6 - 1.0 MG/DL  
 GFR est AA >60 >60 ml/min/1.73m2 GFR est non-AA >60 >60 ml/min/1.73m2 Calcium 7.5 (L) 8.3 - 10.4 MG/DL Bilirubin, total 0.3 0.2 - 1.1 MG/DL  
 ALT (SGPT) 26 12 - 65 U/L  
 AST (SGOT) 25 15 - 37 U/L Alk. phosphatase 59 50 - 136 U/L Protein, total 5.2 (L) 6.3 - 8.2 g/dL Albumin 2.4 (L) 3.2 - 4.6 g/dL Globulin 2.8 2.3 - 3.5 g/dL A-G Ratio 0.9 (L) 1.2 - 3.5 MAGNESIUM Collection Time: 11/14/19  3:03 AM  
Result Value Ref Range Magnesium 2.1 1.8 - 2.4 mg/dL LD Collection Time: 11/14/19  3:03 AM  
Result Value Ref Range  (H) 110 - 210 U/L  
URIC ACID Collection Time: 11/14/19  3:03 AM  
Result Value Ref Range Uric acid 2.6 2.6 - 6.0 MG/DL  
PHOSPHORUS Collection Time: 11/14/19  3:03 AM  
Result Value Ref Range Phosphorus 2.6 2.3 - 3.7 MG/DL PROTHROMBIN TIME + INR Collection Time: 11/14/19  3:03 AM  
Result Value Ref Range Prothrombin time 16.7 (H) 11.7 - 14.5 sec INR 1.3 PTT Collection Time: 11/14/19  3:03 AM  
Result Value Ref Range aPTT 25.2 24.7 - 39.8 SEC FIBRINOGEN Collection Time: 11/14/19  3:03 AM  
Result Value Ref Range Fibrinogen 242 190 - 501 mg/dL CBC WITH AUTOMATED DIFF Collection Time: 11/14/19  3:03 AM  
Result Value Ref Range WBC 0.0 (LL) 4.3 - 11.1 K/uL  
 RBC 2.25 (L) 4.05 - 5.2 M/uL HGB 6.8 (LL) 11.7 - 15.4 g/dL HCT 19.4 (LL) 35.8 - 46.3 % MCV 86.2 79.6 - 97.8 FL  
 MCH 30.2 26.1 - 32.9 PG  
 MCHC 35.1 (H) 31.4 - 35.0 g/dL  
 RDW 12.8 11.9 - 14.6 % PLATELET 8 (LL) 394 - 450 K/uL MPV 10.3 9.4 - 12.3 FL ABSOLUTE NRBC 0.00 0.0 - 0.2 K/uL  
 RBC COMMENTS SLIGHT 
ANISOCYTOSIS + POIKILOCYTOSIS 
    
 RBC COMMENTS HYPOCHROMIA    
 WBC COMMENTS WBC TOO FEW TO DIFFERENTIATE PLATELET COMMENTS MARKED    
 DF MANUAL    
TYPE & SCREEN Collection Time: 11/14/19  3:03 AM  
Result Value Ref Range Crossmatch Expiration 11/17/2019 ABO/Rh(D) AB POSITIVE Antibody screen NEG Unit number [id number] Blood component type RC LRIR Unit division 00 Status of unit REL FROM [facility] Crossmatch result Compatible Unit number [id number] Blood component type RC LRIR Unit division 00 Status of unit ISSUED Crossmatch result Compatible PLATELETS, ALLOCATE Collection Time: 11/14/19  4:15 AM  
Result Value Ref Range Comment APOS PLT OK PER RUBEN GUY NP. NO AB AVAILABLE. Unit number [id number] Blood component type PLTPH,LRIR,1 Unit division 00 Status of unit ALLOCATED Imaging: XR ELBOW RT MIN 3 V [[id number]] Collected: 11/10/19 1421 Order Status: Completed Updated: 11/10/19 1424 Narrative:    
Right elbow Clinical location: Elbow pain after feeling a pop, decreased range of motion FINDINGS: Four views of the right elbow show no fracture or dislocation. There 
is no joint effusion. The soft tissues are unremarkable. Impression:    
IMPRESSION: No acute osseous abnormality or joint derangement of the right 
elbow. ASSESSMENT: 
Problem List  Date Reviewed: 10/31/2019 Codes Class Noted Febrile neutropenia (HCC) ICD-10-CM: D70.9, R50.81 ICD-9-CM: 288.00, 780.61  9/21/2019 Pancytopenia due to antineoplastic chemotherapy [facility]) ICD-10-CM: D61.810, T45.1X5A 
ICD-9-CM: 284.11, E933.1  6/12/2019 Cellulitis of neck ICD-10-CM: F94.976 ICD-9-CM: 682.1  6/12/2019 Immunocompromised status associated with infection ICD-10-CM: B99.9 ICD-9-CM: 136.9  6/12/2019 Port or reservoir infection ICD-10-CM: H27.154L ICD-9-CM: 999.33  6/12/2019 Acute myeloid leukemia not having achieved remission ([facility] ICD-10-CM: C92.00 ICD-9-CM: 205.00  5/9/2019 Admission for antineoplastic chemotherapy ICD-10-CM: Z51.11 ICD-9-CM: V58.11  5/5/2019 AML (acute myeloblastic leukemia) ([facility] ICD-10-CM: C92.00 ICD-9-CM: 205.00  4/28/2019 Weakness generalized ICD-10-CM: R53.1 ICD-9-CM: 780.79  4/28/2019 Pancytopenia ([facility] ICD-10-CM: C73.705 ICD-9-CM: 284.19  4/28/2019 Thrombocytopenia ([facility] ICD-10-CM: D69.6 ICD-9-CM: 287.5  4/27/2019 Ms. Braden Oconnor is a 68 y.o. female admitted on 11/7/2019. She is a known patient of Dr. 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 She failed induction with 7+3/Midostaurin. On Dacogen/Gilteritinib with recent BMbx with persistent residual disease. She is being admitted for FLAG-VENITA. She is feeling well and is ready to proceed with treatment. PLAN: 
AML 
- admit for salvage FLAG-VENITA 
- needs Echo prior - ordered 11/8 Day 1 FLAG-VENITA. Echo with EF 55-60%, proceed with tx. 
11/9 Day 2 FLAG-VENITA. Tolerating well, no issues. Uric acid 3.1 today. 11/10 Day 3 FLAG-VENITA. No issues with treatment. Uric acid 3.3 today. 11/12 Day 5 FLAG-VENITA. Tolerating treatment well. G-CSF to start tomorrow. 11/13 Day 6 FLAG-VENITA, doing well, Granix/claritin starts today 11/14 Day 7 FLAG-VENITA, fatigued but doing well, continue granix Pancytopenia secondary to disease - Transfuse prn per [facility] SOPs R elbow pain 11/11 c/o R elbow pain with limited ROM yesterday. Xray neg. Pain improved today, noted bruising. Normal ROM on exam. 
 
Mild Abd discomfort/loose stools 11/13 discomfort is improved from yesterday, will monitor 11/14 loose stools, but not watery, can use Imodium prn 
 
Continue home meds Prophylactic Antibx: Acyclovir, Voriconazole, Levaquin Alexander SOPs SCDs for DVT prophylaxis (AC contraindicated d/t thrombocytopenia) Disposition:  Will need to stay during count recovery after completion of chemotherapy. Expect hospitalization for several weeks. Upon discharge will need twice weekly labs w transfusions as needed. Weekly provider visits. RTC within 1 week from discharge. Ariadne Rizo NP [address] [place] [address] Hematology & Oncology [address] [place] [address] Office : [phone] Fax : [phone] Attending Addendum: 
I have personally performed a face to face diagnostic evaluation on this patient. I have reviewed and agree with the care plan as documented above by Jonatan Gilliland N.P.  My findings are as follows: Patient appears stable, heart rate regular without murmurs, abdomen is non-tender, bowel sounds are positive. 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 GCSF support started. Ongoing pancytopenia, transfuse blood products as needed. Right elbow discomfort with x-rays negative, now resolved. Gets PRBC and plts today. Continue prophylactic antibiotics including Levaquin, acyclovir and voriconazole. More fatigued today. We will plan to keep in-house to hopeful counts recovery. Ambulation encouraged Total time 35 min 50% in direct consultation about the patient's diagnosis and management Candance Nose, MD 
[address] [place] [address] Hematology/Oncology [address] [place] [address] Office : [phone] Fax : [phone]

## 2019-11-15 LAB
ALBUMIN SERPL-MCNC: 2.4 G/DL (ref 3.2–4.6)
ALBUMIN/GLOB SERPL: 0.8 {RATIO} (ref 1.2–3.5)
ALP SERPL-CCNC: 59 U/L (ref 50–136)
ALT SERPL-CCNC: 26 U/L (ref 12–65)
ANION GAP SERPL CALC-SCNC: 6 MMOL/L (ref 7–16)
APTT PPP: 24.3 SEC (ref 24.7–39.8)
AST SERPL-CCNC: 23 U/L (ref 15–37)
BASOPHILS # BLD: 0 K/UL (ref 0–0.2)
BASOPHILS NFR BLD: 0 % (ref 0–2)
BILIRUB SERPL-MCNC: 0.4 MG/DL (ref 0.2–1.1)
BLD PROD TYP BPU: NORMAL
BLOOD BANK CMNT PATIENT-IMP: NORMAL
BPU ID: NORMAL
BUN SERPL-MCNC: 21 MG/DL (ref 8–23)
CALCIUM SERPL-MCNC: 7.5 MG/DL (ref 8.3–10.4)
CHLORIDE SERPL-SCNC: 108 MMOL/L (ref 98–107)
CO2 SERPL-SCNC: 29 MMOL/L (ref 21–32)
CREAT SERPL-MCNC: 0.55 MG/DL (ref 0.6–1)
DIFFERENTIAL METHOD BLD: ABNORMAL
EOSINOPHIL # BLD: 0 K/UL (ref 0–0.8)
EOSINOPHIL NFR BLD: 0 % (ref 0.5–7.8)
ERYTHROCYTE [DISTWIDTH] IN BLOOD BY AUTOMATED COUNT: 12.7 % (ref 11.9–14.6)
FIBRINOGEN PPP-MCNC: 259 MG/DL (ref 190–501)
GLOBULIN SER CALC-MCNC: 3 G/DL (ref 2.3–3.5)
GLUCOSE SERPL-MCNC: 149 MG/DL (ref 65–100)
HCT VFR BLD AUTO: 20.7 % (ref 35.8–46.3)
HGB BLD-MCNC: 7.3 G/DL (ref 11.7–15.4)
IMM GRANULOCYTES # BLD AUTO: 0 K/UL (ref 0–0.5)
IMM GRANULOCYTES NFR BLD AUTO: 0 % (ref 0–5)
INR PPP: 1.2
LDH SERPL L TO P-CCNC: 408 U/L (ref 110–210)
LYMPHOCYTES # BLD: 0 K/UL (ref 0.5–4.6)
LYMPHOCYTES NFR BLD: 0 % (ref 13–44)
MAGNESIUM SERPL-MCNC: 2.2 MG/DL (ref 1.8–2.4)
MCH RBC QN AUTO: 30.4 PG (ref 26.1–32.9)
MCHC RBC AUTO-ENTMCNC: 35.3 G/DL (ref 31.4–35)
MCV RBC AUTO: 86.3 FL (ref 79.6–97.8)
MONOCYTES # BLD: 0 K/UL (ref 0.1–1.3)
MONOCYTES NFR BLD: 0 % (ref 4–12)
NEUTS SEG # BLD: 0 K/UL (ref 1.7–8.2)
NEUTS SEG NFR BLD: 100 % (ref 43–78)
NRBC # BLD: 0 K/UL (ref 0–0.2)
PHOSPHATE SERPL-MCNC: 2.9 MG/DL (ref 2.3–3.7)
PLATELET # BLD AUTO: 42 K/UL (ref 150–450)
PMV BLD AUTO: 10.9 FL (ref 9.4–12.3)
POTASSIUM SERPL-SCNC: 4.7 MMOL/L (ref 3.5–5.1)
PROT SERPL-MCNC: 5.4 G/DL (ref 6.3–8.2)
PROTHROMBIN TIME: 15.9 SEC (ref 11.7–14.5)
RBC # BLD AUTO: 2.4 M/UL (ref 4.05–5.2)
SODIUM SERPL-SCNC: 143 MMOL/L (ref 136–145)
STATUS OF UNIT,%ST: NORMAL
UNIT DIVISION, %UDIV: 0
URATE SERPL-MCNC: 2.4 MG/DL (ref 2.6–6)
WBC # BLD AUTO: 0 K/UL (ref 4.3–11.1)

## 2019-11-15 PROCEDURE — 99233 SBSQ HOSP IP/OBS HIGH 50: CPT | Performed by: INTERNAL MEDICINE

## 2019-11-15 PROCEDURE — 65270000015 HC RM PRIVATE ONCOLOGY

## 2019-11-15 PROCEDURE — 65270000029 HC RM PRIVATE

## 2019-11-15 PROCEDURE — 85025 COMPLETE CBC W/AUTO DIFF WBC: CPT

## 2019-11-15 PROCEDURE — 80053 COMPREHEN METABOLIC PANEL: CPT

## 2019-11-15 PROCEDURE — 74011250636 HC RX REV CODE- 250/636: Performed by: NURSE PRACTITIONER

## 2019-11-15 PROCEDURE — 74011250637 HC RX REV CODE- 250/637: Performed by: INTERNAL MEDICINE

## 2019-11-15 PROCEDURE — 74011250636 HC RX REV CODE- 250/636: Performed by: INTERNAL MEDICINE

## 2019-11-15 PROCEDURE — 84100 ASSAY OF PHOSPHORUS: CPT

## 2019-11-15 PROCEDURE — 85384 FIBRINOGEN ACTIVITY: CPT

## 2019-11-15 PROCEDURE — 74011250637 HC RX REV CODE- 250/637: Performed by: NURSE PRACTITIONER

## 2019-11-15 PROCEDURE — 85610 PROTHROMBIN TIME: CPT

## 2019-11-15 PROCEDURE — 85730 THROMBOPLASTIN TIME PARTIAL: CPT

## 2019-11-15 PROCEDURE — 84550 ASSAY OF BLOOD/URIC ACID: CPT

## 2019-11-15 PROCEDURE — 83615 LACTATE (LD) (LDH) ENZYME: CPT

## 2019-11-15 PROCEDURE — 83735 ASSAY OF MAGNESIUM: CPT

## 2019-11-15 RX ADMIN — LORAZEPAM 0.5 MG: 2 INJECTION INTRAMUSCULAR; INTRAVENOUS at 08:05

## 2019-11-15 RX ADMIN — TBO-FILGRASTIM 480 MCG: 480 INJECTION, SOLUTION SUBCUTANEOUS at 21:33

## 2019-11-15 RX ADMIN — ONDANSETRON 4 MG: 2 INJECTION INTRAMUSCULAR; INTRAVENOUS at 17:23

## 2019-11-15 RX ADMIN — Medication 400 MG: at 17:18

## 2019-11-15 RX ADMIN — PREDNISOLONE ACETATE 2 DROP: 10 SUSPENSION/ DROPS OPHTHALMIC at 06:08

## 2019-11-15 RX ADMIN — DULOXETINE 30 MG: 30 CAPSULE, DELAYED RELEASE ORAL at 08:07

## 2019-11-15 RX ADMIN — VORICONAZOLE 200 MG: 200 TABLET, FILM COATED ORAL at 21:33

## 2019-11-15 RX ADMIN — DEXAMETHASONE SODIUM PHOSPHATE 10 MG: 10 INJECTION, SOLUTION INTRAMUSCULAR; INTRAVENOUS at 10:39

## 2019-11-15 RX ADMIN — LORAZEPAM 1 MG: 1 TABLET ORAL at 21:33

## 2019-11-15 RX ADMIN — ACYCLOVIR 400 MG: 800 TABLET ORAL at 08:07

## 2019-11-15 RX ADMIN — ONDANSETRON 4 MG: 2 INJECTION INTRAMUSCULAR; INTRAVENOUS at 06:08

## 2019-11-15 RX ADMIN — POTASSIUM & SODIUM PHOSPHATES POWDER PACK 280-160-250 MG 1 PACKET: 280-160-250 PACK at 17:18

## 2019-11-15 RX ADMIN — PREDNISOLONE ACETATE 2 DROP: 10 SUSPENSION/ DROPS OPHTHALMIC at 10:39

## 2019-11-15 RX ADMIN — LOPERAMIDE HYDROCHLORIDE 2 MG: 2 CAPSULE ORAL at 10:44

## 2019-11-15 RX ADMIN — POTASSIUM & SODIUM PHOSPHATES POWDER PACK 280-160-250 MG 1 PACKET: 280-160-250 PACK at 08:07

## 2019-11-15 RX ADMIN — PRAVASTATIN SODIUM 10 MG: 20 TABLET ORAL at 21:33

## 2019-11-15 RX ADMIN — VORICONAZOLE 200 MG: 200 TABLET, FILM COATED ORAL at 08:07

## 2019-11-15 RX ADMIN — ACYCLOVIR 400 MG: 800 TABLET ORAL at 17:19

## 2019-11-15 RX ADMIN — Medication 2 TABLET: at 10:39

## 2019-11-15 RX ADMIN — LORATADINE 10 MG: 10 TABLET ORAL at 08:07

## 2019-11-15 RX ADMIN — POTASSIUM & SODIUM PHOSPHATES POWDER PACK 280-160-250 MG 1 PACKET: 280-160-250 PACK at 21:33

## 2019-11-15 RX ADMIN — Medication 400 MG: at 08:07

## 2019-11-15 RX ADMIN — ALLOPURINOL 300 MG: 300 TABLET ORAL at 08:07

## 2019-11-15 RX ADMIN — LEVOFLOXACIN 500 MG: 500 TABLET, FILM COATED ORAL at 13:51

## 2019-11-15 RX ADMIN — ONDANSETRON 4 MG: 2 INJECTION INTRAMUSCULAR; INTRAVENOUS at 23:52

## 2019-11-15 RX ADMIN — SODIUM CHLORIDE 1000 ML: 900 INJECTION, SOLUTION INTRAVENOUS at 20:54

## 2019-11-15 RX ADMIN — PREDNISOLONE ACETATE 2 DROP: 10 SUSPENSION/ DROPS OPHTHALMIC at 17:19

## 2019-11-15 RX ADMIN — PREDNISOLONE ACETATE 2 DROP: 10 SUSPENSION/ DROPS OPHTHALMIC at 21:37

## 2019-11-15 NOTE — PROGRESS NOTES
Problem: Falls - Risk of 
Goal: *Absence of Falls Description Document Yoanna Carvalho Fall Risk and appropriate interventions in the flowsheet. Outcome: Progressing Towards Goal 
Note:  
Fall Risk Interventions: 
Mobility Interventions: Communicate number of staff needed for ambulation/transfer, Patient to call before getting OOB Medication Interventions: Teach patient to arise slowly Elimination Interventions: Call light in reach, Patient to call for help with toileting needs History of Falls Interventions: Room close to nurse's station Problem: Patient Education: Go to Patient Education Activity Goal: Patient/Family Education Outcome: Progressing Towards Goal

## 2019-11-15 NOTE — PROGRESS NOTES
's follow-up visit to convey care and concern. I offered spiritual support, including prayer as requested. Sowmya Cornelius MDiv Board Certified [employer]

## 2019-11-15 NOTE — PROGRESS NOTES
END OF SHIFT NOTE: 
 
Intake/Output No intake/output data recorded. Voiding: YES Catheter: NO 
Drain:   
 
 
 
 
 
Stool:  4 occurrences. Stool Assessment Stool Color: Black; Emily Stalker (11/13/19 1005) Stool Appearance: Loose (11/14/19 0729) Stool Amount: Small (11/13/19 1005) Stool Source/Status: Rectum (11/13/19 1005) Emesis:  0 occurrences. VITAL SIGNS Patient Vitals for the past 12 hrs: 
 Temp Pulse Resp BP SpO2  
11/14/19 1537 97.5 °F (36.4 °C) 74 18 137/58 98 % 11/14/19 1439 99 °F (37.2 °C) 81 18 126/64 99 % 11/14/19 1351 99.1 °F (37.3 °C) 89 18 135/66 98 % 11/14/19 1213 98.8 °F (37.1 °C) 73 20 139/66 99 % 11/14/19 1106 99 °F (37.2 °C) 79 18 120/56 97 % 11/14/19 1048 98.7 °F (37.1 °C) 95 18 163/68 96 % 11/14/19 0739 98.7 °F (37.1 °C) 67 18 139/69 98 % Pain Assessment Pain 1 Pain Scale 1: Numeric (0 - 10) (11/14/19 1433) Pain Intensity 1: 0 (11/14/19 1433) Patient Stated Pain Goal: 0 (11/14/19 1433) Pain Reassessment 1: Patient resting w/respiratory rate greater than 10 (11/13/19 0250) Pain Onset 1: suddenly (11/10/19 1000) Pain Location 1: Leg (11/11/19 0505) Pain Orientation 1: Left;Right (11/11/19 0505) Pain Description 1: Aching (11/11/19 0505) Pain Intervention(s) 1: Medication (see MAR) (11/11/19 0505) Ambulating Yes Additional Information: Pt not feeling well today. Zofran for nausea given 2x. Received 1U of blood and 1U of platelets. Changed port needle and dressing today. Shift report given to oncoming nurse at the bedside. Dara Gee

## 2019-11-15 NOTE — PROGRESS NOTES
Nutrition Reason for assessment: Length of Stay Assessment:  
Food/Nutrition Patient History:  Patient with PMH of AML and hypercholesterolemia admitted for chemo. She is day 7 FLAG-VENITA. She reports that primary barriers to PO intake are recent decline in appetite, nausea, and diarrhea. She said that her doctor told her to expect decrease in appetite and she feels that she is at that point now. She says that despite these things she has been making herself eat because she knows she needs to. DIET REGULAR Anthropometrics:Height: 5' 6\" (167.6 cm),  Weight: 87.1 kg (192 lb), Weight Source: Standing scale (comment), Body mass index is 30.99 kg/m². BMI class of obese Macronutrient needs: 87.1 kg Listed body weight EER:  3140-0630 kcal /day (18-20 kcal/kg) EPR:  78-87 grams protein/day (20% kcal) Intake/Comparative Standards: Recorded meal(s): 70% of 15 recorded meals. Observed noon tray, 100% consumed. This potentially meets ~98% of kcal and ~85% of protein needs Nutrition Diagnosis: No nutrition diagnosis at this time. Intervention: 
Meals and snacks: Continue current diet. Will add yogurt to all trays per patient request. 
Coordination of Nutrition Care: Katie Simpson Discharge Plan: No nutrition needs anticipated at this time. [address] [facility] [address] [place] [address], [address], [address] [facility]

## 2019-11-15 NOTE — PROGRESS NOTES
[facility] Hematology & Oncology Inpatient Hematology / Oncology Daily Progress Note Reason for Admission:  Admission for antineoplastic chemotherapy [Z51.11] 24 Hour Events: 
Afebrile, VSS Day 8 FLAG-VENITA Fatigued and mild nausea, but feeling a little better overall Imodium working well Abdominal soreness remains ROS: 
Constitutional: +fatigue Negative for fever, chills, weakness, malaise. CV: Negative for chest pain, palpitations, edema. Respiratory: Negative for dyspnea, cough, wheezing. GI: +abd pain, loose stools. Negative for nausea. 10 point review of systems is otherwise negative with the exception of the elements mentioned above in the HPI. No Known Allergies Past Medical History:  
Diagnosis Date  AML (acute myeloid leukemia) ([facility] [address] dx- 4/2019  
 followed by dr Edmund England  Depression  Hypercholesterolemia  Infection   
 of port -- was placed 5/2019-- removed 6/2019---right chest  
 Psychiatric disorder   
 aniexty  Sleep apnea Past Surgical History:  
Procedure Laterality Date  HX OTHER SURGICAL    
 colonoscopy  HX VASCULAR ACCESS    
 IR INSERT TUNL CVC W PORT OVER 5 YEARS  4/30/2019  IR INSERT TUNL CVC W PORT OVER 5 YEARS  7/15/2019  IR REMOVE TUNL CVAD W PORT/PUMP  6/13/2019 Family History Problem Relation Age of Onset  Cancer Father Social History Socioeconomic History  Marital status:  Spouse name: Not on file  Number of children: Not on file  Years of education: Not on file  Highest education level: Not on file Occupational History  Not on file Social Needs  Financial resource strain: Not on file  Food insecurity:  
  Worry: Not on file Inability: Not on file  Transportation needs:  
  Medical: Not on file Non-medical: Not on file Tobacco Use  Smoking status: Never Smoker  Smokeless tobacco: Never Used Substance and Sexual Activity  Alcohol use: Never Frequency: Never  Drug use: Never  Sexual activity: Not on file Lifestyle  Physical activity:  
  Days per week: Not on file Minutes per session: Not on file  Stress: Not on file Relationships  Social connections:  
  Talks on phone: Not on file Gets together: Not on file Attends Judaism service: Not on file Active member of club or organization: Not on file Attends meetings of clubs or organizations: Not on file Relationship status: Not on file  Intimate partner violence:  
  Fear of current or ex partner: Not on file Emotionally abused: Not on file Physically abused: Not on file Forced sexual activity: Not on file Other Topics Concern [address] [employer] Service Not Asked  Blood Transfusions Not Asked  Caffeine Concern Not Asked  Occupational Exposure Not Asked Sherlean Jonh Hazards Not Asked  Sleep Concern Not Asked  Stress Concern Not Asked  Weight Concern Not Asked  Special Diet Not Asked  Back Care Not Asked  Exercise Not Asked  Bike Helmet Not Asked  Seat Belt Not Asked  Self-Exams Not Asked Social History Narrative  Not on file Current Facility-Administered Medications Medication Dose Route Frequency Provider Last Rate Last Dose  
 0.9% sodium chloride infusion 250 mL  250 mL IntraVENous PRN Viki Pearl MD      
 loperamide (IMODIUM) capsule 2 mg  2 mg Oral Q4H PRN Viki Pearl MD   2 mg at 11/15/19 1044  
 0.9% sodium chloride infusion 250 mL  250 mL IntraVENous PRN Viki Pearl MD      
 loratadine (CLARITIN) tablet 10 mg  10 mg Oral DAILY Chaparro Wells NP   10 mg at 11/15/19 8376  sodium chloride 0.9 % bolus infusion 1,000 mL  1,000 mL IntraVENous QHS Chaparro Wells NP   1,000 mL at 11/14/19 2150  central line flush (saline) syringe 10 mL  10 mL InterCATHeter PRADALBERTO Pearl MD   10 mL at 11/12/19 0031  docusate sodium (COLACE) capsule 100 mg  100 mg Oral BID PRN Colon Grapes, NP   100 mg at 11/11/19 1308  potassium, sodium phosphates (NEUTRA-PHOS) packet 1 Packet  1 Packet Oral TID Riki Allen MD   1 Packet at 11/15/19 5349  
 magnesium oxide (MAG-OX) tablet 400 mg  400 mg Oral BID Colon Grapes, NP   400 mg at 11/15/19 1191  dexamethasone (PF) (DECADRON) injection 10 mg  10 mg IntraVENous Q24H Riki Allen MD   10 mg at 11/15/19 1039  LORazepam (ATIVAN) injection 0.5 mg  0.5 mg IntraVENous Q6H PRN Riki Allen MD   0.5 mg at 11/15/19 0105  prednisoLONE acetate (PRED FORTE) 1 % ophthalmic suspension 2 Drop  2 Drop Both Eyes Q6H Riki Allen MD   2 Drop at 11/15/19 1039  
 saline peripheral flush soln 10 mL  10 mL InterCATHeter PRN Riki Allen MD   10 mL at 11/13/19 2154  heparin (porcine) pf 300-500 Units  300-500 Units InterCATHeter PRN Riki Allen MD      
 tbo-filgrastim [facility]) injection 480 mcg  480 mcg SubCUTAneous QHS Riki Allen MD   480 mcg at 11/14/19 2149  acyclovir (ZOVIRAX) tablet 400 mg  400 mg Oral BID Colon Grapes, NP   400 mg at 11/15/19 3530  allopurinol (ZYLOPRIM) tablet 300 mg  300 mg Oral DAILY Colon Grapes, NP   300 mg at 11/15/19 8016  cholecalciferol (VITAMIN D3) (400 Units /10 mcg) tablet 2 Tab  800 Units Oral DAILY Colon Grapes, NP   2 Tab at 11/15/19 1039  DULoxetine (CYMBALTA) capsule 30 mg  30 mg Oral DAILY Colon Grapes, NP   30 mg at 11/15/19 0314  lidocaine-prilocaine (EMLA) 2.5-2.5 % cream   Topical PRN Colon Grapes, NP      
 LORazepam (ATIVAN) tablet 1 mg  1 mg Oral QHS Colon Grapes, NP   1 mg at 11/14/19 2143  pravastatin (PRAVACHOL) tablet 10 mg  10 mg Oral QHS Colon Grapes, NP   10 mg at 11/14/19 2143  voriconazole (VFEND) tablet 200 mg  200 mg Oral Q12H Colon Grapes, NP   200 mg at 11/15/19 2225  ondansetron (ZOFRAN) injection 4 mg  4 mg IntraVENous Q4H PRN Colon Grapes, NP   4 mg at 11/15/19 4170  prochlorperazine (COMPAZINE) with saline injection 5 mg  5 mg IntraVENous Q6H PRN Harini Nair NP      
 HYDROcodone-acetaminophen [facility]) 5-325 mg per tablet 1 Tab  1 Tab Oral Q6H PRN Harini Nair NP   1 Tab at 19 5193  morphine injection 2 mg  2 mg IntraVENous Q4H PRN Harini Nair NP      
 acetaminophen (TYLENOL) tablet 650 mg  650 mg Oral Q6H PRN Nevaeh Dooley MD   650 mg at 19 1013  diphenhydrAMINE (BENADRYL) capsule 25 mg  25 mg Oral Q6H PRN Nevaeh Dooley MD   25 mg at 19 2143  levoFLOXacin (LEVAQUIN) tablet 500 mg  500 mg Oral Q24H Harini aNir NP   500 mg at 19 1141  acetaminophen/diphenhydrAMINE (TYLENOL PM EXT STR) 500/25 mg (Patient Supplied)   Oral QHS Nevaeh Dooley MD      
 vit C,I-Ci-smlyf-lutein-zeaxan (PRESERVISION AREDS-2) capsule 1 Cap (Patient Supplied)  [address] Nevaeh Dooley MD   1 Cap at 11/15/19 6448 OBJECTIVE: 
Patient Vitals for the past 8 hrs: 
 BP Temp Pulse Resp SpO2  
11/15/19 1102 140/68 98.8 °F (37.1 °C) 75 18 97 % 11/15/19 0802 133/67 98.5 °F (36.9 °C) 75 18 97 % Temp (24hrs), Av.6 °F (37 °C), Min:97.5 °F (36.4 °C), Max:99.1 °F (37.3 °C) 
 
11/15 0701 - 11/15 1900 In: 240 [P.O.:240] Out: 700 [Urine:700] Physical Exam: 
Constitutional: Well developed, well nourished female in no acute distress, sitting comfortably in hospital bed. HEENT: Normocephalic and atraumatic. Oropharynx is clear, mucous membranes are moist. Extraocular muscles are intact. Sclerae anicteric. Neck supple without JVD. No thyromegaly present. Skin Warm and dry. No rash noted. No erythema. No pallor. Bruising noted on BUE/R elbow and abdomen. +blood blister to lower right lip-improved Respiratory Lungs are clear to auscultation bilaterally without wheezes, rales or rhonchi, normal air exchange without accessory muscle use. CVS Normal rate, regular rhythm and normal S1 and S2. No murmurs, gallops, or rubs. Abdomen Soft, mildly tender across lower abd-improving, nondistended, normoactive bowel sounds. No palpable mass. No hepatosplenomegaly. Neuro Grossly nonfocal with no obvious sensory or motor deficits. MSK Normal range of motion in general.  No edema and no tenderness. Psych Appropriate mood and affect. Labs:   
Recent Results (from the past 24 hour(s)) METABOLIC PANEL, COMPREHENSIVE Collection Time: 11/15/19  3:13 AM  
Result Value Ref Range Sodium 143 136 - 145 mmol/L Potassium 4.7 3.5 - 5.1 mmol/L Chloride 108 (H) 98 - 107 mmol/L  
 CO2 29 21 - 32 mmol/L Anion gap 6 (L) 7 - 16 mmol/L Glucose 149 (H) 65 - 100 mg/dL BUN 21 8 - 23 MG/DL Creatinine 0.55 (L) 0.6 - 1.0 MG/DL  
 GFR est AA >60 >60 ml/min/1.73m2 GFR est non-AA >60 >60 ml/min/1.73m2 Calcium 7.5 (L) 8.3 - 10.4 MG/DL Bilirubin, total 0.4 0.2 - 1.1 MG/DL  
 ALT (SGPT) 26 12 - 65 U/L  
 AST (SGOT) 23 15 - 37 U/L Alk. phosphatase 59 50 - 136 U/L Protein, total 5.4 (L) 6.3 - 8.2 g/dL Albumin 2.4 (L) 3.2 - 4.6 g/dL Globulin 3.0 2.3 - 3.5 g/dL A-G Ratio 0.8 (L) 1.2 - 3.5 MAGNESIUM Collection Time: 11/15/19  3:13 AM  
Result Value Ref Range Magnesium 2.2 1.8 - 2.4 mg/dL LD Collection Time: 11/15/19  3:13 AM  
Result Value Ref Range  (H) 110 - 210 U/L  
URIC ACID Collection Time: 11/15/19  3:13 AM  
Result Value Ref Range Uric acid 2.4 (L) 2.6 - 6.0 MG/DL  
PHOSPHORUS Collection Time: 11/15/19  3:13 AM  
Result Value Ref Range Phosphorus 2.9 2.3 - 3.7 MG/DL PROTHROMBIN TIME + INR Collection Time: 11/15/19  3:13 AM  
Result Value Ref Range Prothrombin time 15.9 (H) 11.7 - 14.5 sec INR 1.2 PTT Collection Time: 11/15/19  3:13 AM  
Result Value Ref Range aPTT 24.3 (L) 24.7 - 39.8 SEC FIBRINOGEN Collection Time: 11/15/19  3:13 AM  
Result Value Ref Range Fibrinogen 259 190 - 501 mg/dL CBC WITH AUTOMATED DIFF  
 Collection Time: 11/15/19  3:13 AM  
Result Value Ref Range WBC 0.0 (LL) 4.3 - 11.1 K/uL  
 RBC 2.40 (L) 4.05 - 5.2 M/uL HGB 7.3 (L) 11.7 - 15.4 g/dL HCT 20.7 (LL) 35.8 - 46.3 % MCV 86.3 79.6 - 97.8 FL  
 MCH 30.4 26.1 - 32.9 PG  
 MCHC 35.3 (H) 31.4 - 35.0 g/dL  
 RDW 12.7 11.9 - 14.6 % PLATELET 42 (L) 008 - 450 K/uL MPV 10.9 9.4 - 12.3 FL ABSOLUTE NRBC 0.00 0.0 - 0.2 K/uL  
 DF AUTOMATED NEUTROPHILS 100 (H) 43 - 78 % LYMPHOCYTES 0 (L) 13 - 44 % MONOCYTES 0 (L) 4.0 - 12.0 % EOSINOPHILS 0 (L) 0.5 - 7.8 % BASOPHILS 0 0.0 - 2.0 % IMMATURE GRANULOCYTES 0 0.0 - 5.0 %  
 ABS. NEUTROPHILS 0.0 (L) 1.7 - 8.2 K/UL  
 ABS. LYMPHOCYTES 0.0 (L) 0.5 - 4.6 K/UL  
 ABS. MONOCYTES 0.0 (L) 0.1 - 1.3 K/UL  
 ABS. EOSINOPHILS 0.0 0.0 - 0.8 K/UL  
 ABS. BASOPHILS 0.0 0.0 - 0.2 K/UL  
 ABS. IMM. GRANS. 0.0 0.0 - 0.5 K/UL Imaging: XR ELBOW RT MIN 3 V [[id number]] Collected: 11/10/19 1421 Order Status: Completed Updated: 11/10/19 1424 Narrative:    
Right elbow Clinical location: Elbow pain after feeling a pop, decreased range of motion FINDINGS: Four views of the right elbow show no fracture or dislocation. There 
is no joint effusion. The soft tissues are unremarkable. Impression:    
IMPRESSION: No acute osseous abnormality or joint derangement of the right 
elbow. ASSESSMENT: 
Problem List  Date Reviewed: 10/31/2019 Codes Class Noted Febrile neutropenia (HCC) ICD-10-CM: D70.9, R50.81 ICD-9-CM: 288.00, 780.61  9/21/2019 Pancytopenia due to antineoplastic chemotherapy [facility]) ICD-10-CM: D61.810, T45.1X5A 
ICD-9-CM: 284.11, E933.1  6/12/2019 Cellulitis of neck ICD-10-CM: O39.164 ICD-9-CM: 682.1  6/12/2019 Immunocompromised status associated with infection ICD-10-CM: B99.9 ICD-9-CM: 136.9  6/12/2019 Port or reservoir infection ICD-10-CM: M09.251Z ICD-9-CM: 999.33  6/12/2019 Acute myeloid leukemia not having achieved remission ([facility] ICD-10-CM: C92.00 ICD-9-CM: 205.00  5/9/2019 Admission for antineoplastic chemotherapy ICD-10-CM: Z51.11 ICD-9-CM: V58.11  5/5/2019 AML (acute myeloblastic leukemia) ([facility] ICD-10-CM: C92.00 ICD-9-CM: 205.00  4/28/2019 Weakness generalized ICD-10-CM: R53.1 ICD-9-CM: 780.79  4/28/2019 Pancytopenia ([facility] ICD-10-CM: I79.291 ICD-9-CM: 284.19  4/28/2019 Thrombocytopenia ([facility] ICD-10-CM: D69.6 ICD-9-CM: 287.5  4/27/2019 Ms. Mirlande Arndt is a 68 y.o. female admitted on 11/7/2019. She is a known patient of Dr. 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 She failed induction with 7+3/Midostaurin. On Dacogen/Gilteritinib with recent BMbx with persistent residual disease. She is being admitted for FLAG-VENITA. She is feeling well and is ready to proceed with treatment. PLAN: 
AML 
- admit for salvage FLAG-VENITA 
- needs Echo prior - ordered 11/8 Day 1 FLAG-VENITA. Echo with EF 55-60%, proceed with tx. 
11/9 Day 2 FLAG-VENITA. Tolerating well, no issues. Uric acid 3.1 today. 11/10 Day 3 FLAG-VENITA. No issues with treatment. Uric acid 3.3 today. 11/12 Day 5 FLAG-VENITA. Tolerating treatment well. G-CSF to start tomorrow. 11/13 Day 6 FLAG-VENITA, doing well, Granix/claritin starts today 11/14 Day 7 FLAG-VENITA, fatigued but doing well, continue granix 11/15 Day 8 FLAG-VENITA, continue granix Pancytopenia secondary to disease - Transfuse prn per [facility] SOPs R elbow pain 11/11 c/o R elbow pain with limited ROM yesterday. Xray neg. Pain improved today, noted bruising. Normal ROM on exam. 
 
Mild Abd discomfort/loose stools 11/13 discomfort is improved from yesterday, will monitor 11/14 loose stools, but not watery, can use Imodium prn 
11/15 Imodium working well Continue home meds Prophylactic Antibx: Acyclovir, Voriconazole, Levaquin [facility] SOPs SCDs for DVT prophylaxis (AC contraindicated d/t thrombocytopenia) Disposition:  Will need to stay during count recovery after completion of chemotherapy. Expect hospitalization for several weeks. Upon discharge will need twice weekly labs w transfusions as needed. Weekly provider visits. RTC within 1 week from discharge. Kaden Angeles NP [facility] Hematology & Oncology [address] [place] [address] Office : [phone] Fax : [phone] Attending Addendum: 
I have personally performed a face to face diagnostic evaluation on this patient. I have reviewed and agree with the care plan as documented above by Shaniqua Vazquez N.P.  My findings are as follows: Patient appears stable, heart rate regular without murmurs, abdomen is non-tender, bowel sounds are positive. 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 GCSF support started. Ongoing pancytopenia, transfuse blood products as needed. Right elbow discomfort with x-rays negative, now resolved. . Continue prophylactic antibiotics including Levaquin, acyclovir and voriconazole. More fatigued today. Imodium helping w loose stools. We will plan to keep in-house to hopeful counts recovery. Ambulation encouraged Total time 35 min 50% in direct consultation about the patient's diagnosis and management Landy Goncalves MD 
[facility] Hematology/Oncology [address] [address] Office : [phone] Fax : [phone]

## 2019-11-15 NOTE — PROGRESS NOTES
0640-Bedside report received from Sabi Ryan RN. Resting in bed. No needs voiced. No s/s of acute distress. 1800-END OF SHIFT NOTE: 
Pt's VSS and is in no acute distress. Pt having nausea today and received zofran and ativan. Pt had one loose stool and received imodium. Pt ate all of lunch. Intake/Output 11/15 0701 - 11/15 1900 In: 240 [P.O.:240] Out: 700 [Urine:700] Voiding: YES Catheter: NO 
Drain:   
 
Stool:  1 occurrences. Stool Assessment Stool Color: Black; Nerissa Sheng (11/13/19 1005) Stool Appearance: Loose (11/14/19 1910) Stool Amount: Small (11/13/19 1005) Stool Source/Status: Rectum (11/13/19 1005) Emesis:  0 occurrences. VITAL SIGNS Patient Vitals for the past 12 hrs: 
 Temp Pulse Resp BP SpO2  
11/15/19 1513 97.8 °F (36.6 °C) 82 18 136/67 97 % 11/15/19 1102 98.8 °F (37.1 °C) 75 18 140/68 97 % 11/15/19 0802 98.5 °F (36.9 °C) 75 18 133/67 97 % Pain Assessment Pain 1 Pain Scale 1: Numeric (0 - 10) (11/15/19 1428) Pain Intensity 1: 0 (11/15/19 1428) Patient Stated Pain Goal: 0 (11/15/19 0802) Pain Reassessment 1: Patient resting w/respiratory rate greater than 10 (11/13/19 0250) Pain Onset 1: suddenly (11/10/19 1000) Pain Location 1: Leg (11/11/19 0505) Pain Orientation 1: Left;Right (11/11/19 0505) Pain Description 1: Aching (11/11/19 0505) Pain Intervention(s) 1: Medication (see MAR) (11/11/19 0505) Ambulating Yes Feliberto Rai 1905-Bedside shift change report given to Rosey [facility] Lottie (oncoming nurse) by Timmy Leong (offgoing nurse). Report included the following information SBAR, Kardex, Intake/Output, MAR and Recent Results.

## 2019-11-16 LAB
ALBUMIN SERPL-MCNC: 2.3 G/DL (ref 3.2–4.6)
ALBUMIN/GLOB SERPL: 0.8 {RATIO} (ref 1.2–3.5)
ALP SERPL-CCNC: 56 U/L (ref 50–136)
ALT SERPL-CCNC: 23 U/L (ref 12–65)
ANION GAP SERPL CALC-SCNC: 5 MMOL/L (ref 7–16)
APTT PPP: 26.4 SEC (ref 24.7–39.8)
AST SERPL-CCNC: 16 U/L (ref 15–37)
BILIRUB SERPL-MCNC: 0.3 MG/DL (ref 0.2–1.1)
BUN SERPL-MCNC: 19 MG/DL (ref 8–23)
CALCIUM SERPL-MCNC: 6.7 MG/DL (ref 8.3–10.4)
CHLORIDE SERPL-SCNC: 108 MMOL/L (ref 98–107)
CO2 SERPL-SCNC: 28 MMOL/L (ref 21–32)
CREAT SERPL-MCNC: 0.55 MG/DL (ref 0.6–1)
DIFFERENTIAL METHOD BLD: ABNORMAL
ERYTHROCYTE [DISTWIDTH] IN BLOOD BY AUTOMATED COUNT: 12.9 % (ref 11.9–14.6)
FIBRINOGEN PPP-MCNC: 319 MG/DL (ref 190–501)
GLOBULIN SER CALC-MCNC: 2.9 G/DL (ref 2.3–3.5)
GLUCOSE SERPL-MCNC: 119 MG/DL (ref 65–100)
HCT VFR BLD AUTO: 20.8 % (ref 35.8–46.3)
HGB BLD-MCNC: 7.3 G/DL (ref 11.7–15.4)
INR PPP: 1.1
LDH SERPL L TO P-CCNC: 362 U/L (ref 110–210)
MAGNESIUM SERPL-MCNC: 2.1 MG/DL (ref 1.8–2.4)
MCH RBC QN AUTO: 30.5 PG (ref 26.1–32.9)
MCHC RBC AUTO-ENTMCNC: 35.1 G/DL (ref 31.4–35)
MCV RBC AUTO: 87 FL (ref 79.6–97.8)
NRBC # BLD: 0 K/UL (ref 0–0.2)
PHOSPHATE SERPL-MCNC: 2.8 MG/DL (ref 2.3–3.7)
PLATELET # BLD AUTO: 27 K/UL (ref 150–450)
PMV BLD AUTO: 11.5 FL (ref 9.4–12.3)
POTASSIUM SERPL-SCNC: 4.5 MMOL/L (ref 3.5–5.1)
PROT SERPL-MCNC: 5.2 G/DL (ref 6.3–8.2)
PROTHROMBIN TIME: 14.8 SEC (ref 11.7–14.5)
RBC # BLD AUTO: 2.39 M/UL (ref 4.05–5.2)
SODIUM SERPL-SCNC: 141 MMOL/L (ref 136–145)
URATE SERPL-MCNC: 2.2 MG/DL (ref 2.6–6)
WBC # BLD AUTO: 0 K/UL (ref 4.3–11.1)

## 2019-11-16 PROCEDURE — 74011250636 HC RX REV CODE- 250/636: Performed by: NURSE PRACTITIONER

## 2019-11-16 PROCEDURE — 84100 ASSAY OF PHOSPHORUS: CPT

## 2019-11-16 PROCEDURE — 85025 COMPLETE CBC W/AUTO DIFF WBC: CPT

## 2019-11-16 PROCEDURE — 83615 LACTATE (LD) (LDH) ENZYME: CPT

## 2019-11-16 PROCEDURE — 74011250637 HC RX REV CODE- 250/637: Performed by: NURSE PRACTITIONER

## 2019-11-16 PROCEDURE — 85730 THROMBOPLASTIN TIME PARTIAL: CPT

## 2019-11-16 PROCEDURE — 74011250637 HC RX REV CODE- 250/637: Performed by: INTERNAL MEDICINE

## 2019-11-16 PROCEDURE — 74011250636 HC RX REV CODE- 250/636: Performed by: INTERNAL MEDICINE

## 2019-11-16 PROCEDURE — 36591 DRAW BLOOD OFF VENOUS DEVICE: CPT

## 2019-11-16 PROCEDURE — 65270000015 HC RM PRIVATE ONCOLOGY

## 2019-11-16 PROCEDURE — 80053 COMPREHEN METABOLIC PANEL: CPT

## 2019-11-16 PROCEDURE — 84550 ASSAY OF BLOOD/URIC ACID: CPT

## 2019-11-16 PROCEDURE — 83735 ASSAY OF MAGNESIUM: CPT

## 2019-11-16 PROCEDURE — 85384 FIBRINOGEN ACTIVITY: CPT

## 2019-11-16 PROCEDURE — 99233 SBSQ HOSP IP/OBS HIGH 50: CPT | Performed by: INTERNAL MEDICINE

## 2019-11-16 PROCEDURE — 85610 PROTHROMBIN TIME: CPT

## 2019-11-16 PROCEDURE — 65270000029 HC RM PRIVATE

## 2019-11-16 RX ADMIN — VORICONAZOLE 200 MG: 200 TABLET, FILM COATED ORAL at 23:47

## 2019-11-16 RX ADMIN — PREDNISOLONE ACETATE 2 DROP: 10 SUSPENSION/ DROPS OPHTHALMIC at 23:00

## 2019-11-16 RX ADMIN — LORAZEPAM 0.5 MG: 2 INJECTION INTRAMUSCULAR; INTRAVENOUS at 07:19

## 2019-11-16 RX ADMIN — DULOXETINE 30 MG: 30 CAPSULE, DELAYED RELEASE ORAL at 08:10

## 2019-11-16 RX ADMIN — Medication 400 MG: at 23:49

## 2019-11-16 RX ADMIN — ACYCLOVIR 400 MG: 800 TABLET ORAL at 08:10

## 2019-11-16 RX ADMIN — Medication 2 TABLET: at 08:10

## 2019-11-16 RX ADMIN — LORAZEPAM 1 MG: 1 TABLET ORAL at 23:46

## 2019-11-16 RX ADMIN — TBO-FILGRASTIM 480 MCG: 480 INJECTION, SOLUTION SUBCUTANEOUS at 22:00

## 2019-11-16 RX ADMIN — DEXAMETHASONE SODIUM PHOSPHATE 10 MG: 10 INJECTION, SOLUTION INTRAMUSCULAR; INTRAVENOUS at 10:23

## 2019-11-16 RX ADMIN — ACYCLOVIR 400 MG: 800 TABLET ORAL at 23:46

## 2019-11-16 RX ADMIN — LORAZEPAM 0.5 MG: 2 INJECTION INTRAMUSCULAR; INTRAVENOUS at 20:17

## 2019-11-16 RX ADMIN — POTASSIUM & SODIUM PHOSPHATES POWDER PACK 280-160-250 MG 1 PACKET: 280-160-250 PACK at 08:10

## 2019-11-16 RX ADMIN — PREDNISOLONE ACETATE 2 DROP: 10 SUSPENSION/ DROPS OPHTHALMIC at 07:21

## 2019-11-16 RX ADMIN — LORATADINE 10 MG: 10 TABLET ORAL at 08:10

## 2019-11-16 RX ADMIN — POTASSIUM & SODIUM PHOSPHATES POWDER PACK 280-160-250 MG 1 PACKET: 280-160-250 PACK at 22:00

## 2019-11-16 RX ADMIN — DIPHENHYDRAMINE HYDROCHLORIDE 25 MG: 25 CAPSULE ORAL at 23:48

## 2019-11-16 RX ADMIN — Medication 400 MG: at 08:10

## 2019-11-16 RX ADMIN — SODIUM CHLORIDE 1000 ML: 900 INJECTION, SOLUTION INTRAVENOUS at 22:00

## 2019-11-16 RX ADMIN — LEVOFLOXACIN 500 MG: 500 TABLET, FILM COATED ORAL at 13:15

## 2019-11-16 RX ADMIN — PRAVASTATIN SODIUM 10 MG: 20 TABLET ORAL at 23:48

## 2019-11-16 RX ADMIN — LOPERAMIDE HYDROCHLORIDE 2 MG: 2 CAPSULE ORAL at 10:23

## 2019-11-16 RX ADMIN — PREDNISOLONE ACETATE 2 DROP: 10 SUSPENSION/ DROPS OPHTHALMIC at 19:30

## 2019-11-16 RX ADMIN — PREDNISOLONE ACETATE 2 DROP: 10 SUSPENSION/ DROPS OPHTHALMIC at 13:15

## 2019-11-16 RX ADMIN — VORICONAZOLE 200 MG: 200 TABLET, FILM COATED ORAL at 08:10

## 2019-11-16 RX ADMIN — ALLOPURINOL 300 MG: 300 TABLET ORAL at 08:10

## 2019-11-16 NOTE — PROGRESS NOTES
[address] [facility] Hematology & Oncology Inpatient Hematology / Oncology Daily Progress Note Reason for Admission:  Admission for antineoplastic chemotherapy [Z51.11] 24 Hour Events: 
Afebrile, VSS Day 9 FLAG-VENITA Fatigued Diarrhea improved ROS: 
Constitutional: +fatigue Negative for fever, chills, weakness, malaise. CV: Negative for chest pain, palpitations, edema. Respiratory: Negative for dyspnea, cough, wheezing. GI: +abd pain-much improved, loose stools. Negative for nausea. 10 point review of systems is otherwise negative with the exception of the elements mentioned above in the HPI. No Known Allergies Past Medical History:  
Diagnosis Date  AML (acute myeloid leukemia) ([facility] [address] dx- 4/2019  
 followed by dr Opal Garibay  Depression  Hypercholesterolemia  Infection   
 of port -- was placed 5/2019-- removed 6/2019---right chest  
 Psychiatric disorder   
 aniexty  Sleep apnea Past Surgical History:  
Procedure Laterality Date  HX OTHER SURGICAL    
 colonoscopy  HX VASCULAR ACCESS    
 IR INSERT TUNL CVC W PORT OVER 5 YEARS  4/30/2019  IR INSERT TUNL CVC W PORT OVER 5 YEARS  7/15/2019  IR REMOVE TUNL CVAD W PORT/PUMP  6/13/2019 Family History Problem Relation Age of Onset  Cancer Father Social History Socioeconomic History  Marital status:  Spouse name: Not on file  Number of children: Not on file  Years of education: Not on file  Highest education level: Not on file Occupational History  Not on file Social Needs  Financial resource strain: Not on file  Food insecurity:  
  Worry: Not on file Inability: Not on file  Transportation needs:  
  Medical: Not on file Non-medical: Not on file Tobacco Use  Smoking status: Never Smoker  Smokeless tobacco: Never Used Substance and Sexual Activity  Alcohol use: Never Frequency: Never  Drug use: Never  Sexual activity: Not on file Lifestyle  Physical activity:  
  Days per week: Not on file Minutes per session: Not on file  Stress: Not on file Relationships  Social connections:  
  Talks on phone: Not on file Gets together: Not on file Attends Denominational service: Not on file Active member of club or organization: Not on file Attends meetings of clubs or organizations: Not on file Relationship status: Not on file  Intimate partner violence:  
  Fear of current or ex partner: Not on file Emotionally abused: Not on file Physically abused: Not on file Forced sexual activity: Not on file Other Topics Concern [address] Service Not Asked  Blood Transfusions Not Asked  Caffeine Concern Not Asked  Occupational Exposure Not Asked Jonathan Goetz Hazards Not Asked  Sleep Concern Not Asked  Stress Concern Not Asked  Weight Concern Not Asked  Special Diet Not Asked  Back Care Not Asked  Exercise Not Asked  Bike Helmet Not Asked  Seat Belt Not Asked  Self-Exams Not Asked Social History Narrative  Not on file Current Facility-Administered Medications Medication Dose Route Frequency Provider Last Rate Last Dose  
 0.9% sodium chloride infusion 250 mL  250 mL IntraVENous PRN Clare Cook MD      
 loperamide (IMODIUM) capsule 2 mg  2 mg Oral Q4H PRN Clare Cook MD   2 mg at 11/16/19 1023  
 0.9% sodium chloride infusion 250 mL  250 mL IntraVENous PRN Clare Cook MD      
 loratadine (CLARITIN) tablet 10 mg  10 mg Oral DAILY Jessica Leach NP   10 mg at 11/16/19 0810  
 sodium chloride 0.9 % bolus infusion 1,000 mL  1,000 mL IntraVENous QHS Jessica Leach NP   1,000 mL at 11/15/19 2054  central line flush (saline) syringe 10 mL  10 mL InterCATHeter PRN Clare Cook MD   10 mL at 11/12/19 0031  
 docusate sodium (COLACE) capsule 100 mg  100 mg Oral BID PRN Azra Rand NP   100 mg at 11/11/19 1308  potassium, sodium phosphates (NEUTRA-PHOS) packet 1 Packet  1 Packet Oral TID Gregorio Lorenzo MD   1 Packet at 11/16/19 0810  
 magnesium oxide (MAG-OX) tablet 400 mg  400 mg Oral BID Algie Reins, NP   400 mg at 11/16/19 0810  
 dexamethasone (PF) (DECADRON) injection 10 mg  10 mg IntraVENous Q24H Gregorio Lorenzo MD   10 mg at 11/16/19 1023  LORazepam (ATIVAN) injection 0.5 mg  0.5 mg IntraVENous Q6H PRN Gregorio Lorenzo MD   0.5 mg at 11/16/19 4001  prednisoLONE acetate (PRED FORTE) 1 % ophthalmic suspension 2 Drop  2 Drop Both Eyes Q6H Gregorio Lorenzo MD   2 Drop at 11/16/19 1315  saline peripheral flush soln 10 mL  10 mL InterCATHeter PRN Gregorio Lorenzo MD   10 mL at 11/13/19 2154  heparin (porcine) pf 300-500 Units  300-500 Units InterCATHeter PRN Gregorio Lorenzo MD      
 tbo-filgrastim [facility]) injection 480 mcg  480 mcg SubCUTAneous QHS Gregorio Lorenzo MD   480 mcg at 11/15/19 2133  
 acyclovir (ZOVIRAX) tablet 400 mg  400 mg Oral BID Algie Reins, NP   400 mg at 11/16/19 0810  
 allopurinol (ZYLOPRIM) tablet 300 mg  300 mg Oral DAILY Algie Reins, NP   300 mg at 11/16/19 6781  cholecalciferol (VITAMIN D3) (400 Units /10 mcg) tablet 2 Tab  800 Units Oral DAILY Algie Reins, NP   2 Tab at 11/16/19 7799  DULoxetine (CYMBALTA) capsule 30 mg  30 mg Oral DAILY Algie Reins, NP   30 mg at 11/16/19 1483  lidocaine-prilocaine (EMLA) 2.5-2.5 % cream   Topical PRN Algie Reins, NP      
 LORazepam (ATIVAN) tablet 1 mg  1 mg Oral QHS Algie Reins, NP   1 mg at 11/15/19 2133  pravastatin (PRAVACHOL) tablet 10 mg  10 mg Oral QHS Algie Reins, NP   10 mg at 11/15/19 2133  voriconazole (VFEND) tablet 200 mg  200 mg Oral Q12H Algie Reins, NP   200 mg at 11/16/19 0810  
 ondansetron (ZOFRAN) injection 4 mg  4 mg IntraVENous Q4H PRN Algie Reins, NP   4 mg at 11/15/19 2352  prochlorperazine (COMPAZINE) with saline injection 5 mg  5 mg IntraVENous Q6H PRN Algie Reins, NP      
  HYDROcodone-acetaminophen (NORCO) 5-325 mg per tablet 1 Tab  1 Tab Oral Q6H PRN Ruchi Manning NP   1 Tab at 19 4366  morphine injection 2 mg  2 mg IntraVENous Q4H PRN Ruchi Manning NP      
 acetaminophen (TYLENOL) tablet 650 mg  650 mg Oral Q6H PRN Tenzin Yap MD   650 mg at 19 1013  diphenhydrAMINE (BENADRYL) capsule 25 mg  25 mg Oral Q6H PRN Tenzin Yap MD   25 mg at 19 2143  levoFLOXacin (LEVAQUIN) tablet 500 mg  500 mg Oral Q24H Ruchi Manning NP   500 mg at 19 1315  acetaminophen/diphenhydrAMINE (TYLENOL PM EXT STR) 500/25 mg (Patient Supplied)   Oral QHS Tenzin Yap MD      
 vit C,H-Oy-iiwah-lutein-zeaxan (PRESERVISION AREDS-2) capsule 1 Cap (Patient Supplied)  [address] Tenzin Yap MD   1 Cap at 19 9762 OBJECTIVE: 
Patient Vitals for the past 8 hrs: 
 BP Temp Pulse Resp SpO2  
19 1200 95/65 98.7 °F (37.1 °C) 95 18 98 % 19 0712 145/75 98.5 °F (36.9 °C) 79 18 98 % Temp (24hrs), Av.6 °F (37 °C), Min:97.8 °F (36.6 °C), Max:99.1 °F (37.3 °C) 
 
 0701 -  1900 In: -  
Out: 925 [Urine:925] Physical Exam: 
Constitutional: Well developed, well nourished female in no acute distress, sitting comfortably in hospital bed. HEENT: Normocephalic and atraumatic. Oropharynx is clear, mucous membranes are moist. Extraocular muscles are intact. Sclerae anicteric. Neck supple without JVD. No thyromegaly present. Skin Warm and dry. No rash noted. No erythema. No pallor. Bruising noted on BUE/R elbow and abdomen. Respiratory Lungs are clear to auscultation bilaterally without wheezes, rales or rhonchi, normal air exchange without accessory muscle use. CVS Normal rate, regular rhythm and normal S1 and S2. No murmurs, gallops, or rubs. Abdomen Soft, mildly tender across lower abd-improving, nondistended, normoactive bowel sounds. No palpable mass. No hepatosplenomegaly. Neuro Grossly nonfocal with no obvious sensory or motor deficits. MSK Normal range of motion in general.  No edema and no tenderness. Psych Appropriate mood and affect. Labs:   
Recent Results (from the past 24 hour(s)) METABOLIC PANEL, COMPREHENSIVE Collection Time: 11/16/19  3:24 AM  
Result Value Ref Range Sodium 141 136 - 145 mmol/L Potassium 4.5 3.5 - 5.1 mmol/L Chloride 108 (H) 98 - 107 mmol/L  
 CO2 28 21 - 32 mmol/L Anion gap 5 (L) 7 - 16 mmol/L Glucose 119 (H) 65 - 100 mg/dL BUN 19 8 - 23 MG/DL Creatinine 0.55 (L) 0.6 - 1.0 MG/DL  
 GFR est AA >60 >60 ml/min/1.73m2 GFR est non-AA >60 >60 ml/min/1.73m2 Calcium 6.7 (L) 8.3 - 10.4 MG/DL Bilirubin, total 0.3 0.2 - 1.1 MG/DL  
 ALT (SGPT) 23 12 - 65 U/L  
 AST (SGOT) 16 15 - 37 U/L Alk. phosphatase 56 50 - 136 U/L Protein, total 5.2 (L) 6.3 - 8.2 g/dL Albumin 2.3 (L) 3.2 - 4.6 g/dL Globulin 2.9 2.3 - 3.5 g/dL A-G Ratio 0.8 (L) 1.2 - 3.5 MAGNESIUM Collection Time: 11/16/19  3:24 AM  
Result Value Ref Range Magnesium 2.1 1.8 - 2.4 mg/dL LD Collection Time: 11/16/19  3:24 AM  
Result Value Ref Range  (H) 110 - 210 U/L  
URIC ACID Collection Time: 11/16/19  3:24 AM  
Result Value Ref Range Uric acid 2.2 (L) 2.6 - 6.0 MG/DL  
PHOSPHORUS Collection Time: 11/16/19  3:24 AM  
Result Value Ref Range Phosphorus 2.8 2.3 - 3.7 MG/DL PROTHROMBIN TIME + INR Collection Time: 11/16/19  3:24 AM  
Result Value Ref Range Prothrombin time 14.8 (H) 11.7 - 14.5 sec INR 1.1 PTT Collection Time: 11/16/19  3:24 AM  
Result Value Ref Range aPTT 26.4 24.7 - 39.8 SEC FIBRINOGEN Collection Time: 11/16/19  3:24 AM  
Result Value Ref Range Fibrinogen 319 190 - 501 mg/dL CBC WITH AUTOMATED DIFF Collection Time: 11/16/19  3:24 AM  
Result Value Ref Range WBC 0.0 (LL) 4.3 - 11.1 K/uL  
 RBC 2.39 (L) 4.05 - 5.2 M/uL HGB 7.3 (L) 11.7 - 15.4 g/dL HCT 20.8 (LL) 35.8 - 46.3 % MCV 87.0 79.6 - 97.8 FL  
 MCH 30.5 26.1 - 32.9 PG  
 MCHC 35.1 (H) 31.4 - 35.0 g/dL  
 RDW 12.9 11.9 - 14.6 % PLATELET 27 (LL) 489 - 450 K/uL MPV 11.5 9.4 - 12.3 FL ABSOLUTE NRBC 0.00 0.0 - 0.2 K/uL  
 DF WBC TOO FEW TO DIFFERENTIATE Imaging: XR ELBOW RT MIN 3 V [[id number]] Collected: 11/10/19 1421 Order Status: Completed Updated: 11/10/19 1424 Narrative:    
Right elbow Clinical location: Elbow pain after feeling a pop, decreased range of motion FINDINGS: Four views of the right elbow show no fracture or dislocation. There 
is no joint effusion. The soft tissues are unremarkable. Impression:    
IMPRESSION: No acute osseous abnormality or joint derangement of the right 
elbow. ASSESSMENT: 
Problem List  Date Reviewed: 10/31/2019 Codes Class Noted Febrile neutropenia (HCC) ICD-10-CM: D70.9, R50.81 ICD-9-CM: 288.00, 780.61  9/21/2019 Pancytopenia due to antineoplastic chemotherapy [facility]) ICD-10-CM: D61.810, T45.1X5A 
ICD-9-CM: 284.11, E933.1  6/12/2019 Cellulitis of neck ICD-10-CM: U80.279 ICD-9-CM: 682.1  6/12/2019 Immunocompromised status associated with infection ICD-10-CM: B99.9 ICD-9-CM: 136.9  6/12/2019 Port or reservoir infection ICD-10-CM: O89.921Y ICD-9-CM: 999.33  6/12/2019 Acute myeloid leukemia not having achieved remission ([facility] ICD-10-CM: C92.00 ICD-9-CM: 205.00  5/9/2019 Admission for antineoplastic chemotherapy ICD-10-CM: Z51.11 ICD-9-CM: V58.11  5/5/2019 AML (acute myeloblastic leukemia) ([facility] ICD-10-CM: C92.00 ICD-9-CM: 205.00  4/28/2019 Weakness generalized ICD-10-CM: R53.1 ICD-9-CM: 780.79  4/28/2019 Pancytopenia ([facility] ICD-10-CM: N62.817 ICD-9-CM: 284.19  4/28/2019 Thrombocytopenia ([facility] ICD-10-CM: D69.6 ICD-9-CM: 287.5  4/27/2019 Ms. Jefry Ambrocio is a 68 y.o. female admitted on 11/7/2019.   She is a known patient of Dr. 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 She failed induction with 7+3/Midostaurin. On Dacogen/Gilteritinib with recent BMbx with persistent residual disease. She is being admitted for FLAG-VENITA. She is feeling well and is ready to proceed with treatment. PLAN: 
AML 
- admit for salvage FLAG-VENITA 
- needs Echo prior - ordered 11/8 Day 1 FLAG-VENITA. Echo with EF 55-60%, proceed with tx. 
11/9 Day 2 FLAG-VENITA. Tolerating well, no issues. Uric acid 3.1 today. 11/10 Day 3 FLAG-VENITA. No issues with treatment. Uric acid 3.3 today. 11/12 Day 5 FLAG-VENITA. Tolerating treatment well. G-CSF to start tomorrow. 11/13 Day 6 FLAG-VENITA, doing well, Granix/claritin starts today 11/14 Day 7 FLAG-VENITA, fatigued but doing well, continue granix 11/15 Day 8 FLAG-VENITA, continue granix 11/16 Day 9 FLAG-VENITA, WBC 0, continue granix Pancytopenia secondary to disease - Transfuse prn per Alexanedr SOPs R elbow pain 11/11 c/o R elbow pain with limited ROM yesterday. Xray neg. Pain improved today, noted bruising. Normal ROM on exam. 
 
Mild Abd discomfort/loose stools 11/13 discomfort is improved from yesterday, will monitor 11/14 loose stools, but not watery, can use Imodium prn 
11/15 Imodium working well  
11/16 controlled Continue home meds Prophylactic Antibx: Acyclovir, Voriconazole, Levaquin Alexander SOPs SCDs for DVT prophylaxis (AC contraindicated d/t thrombocytopenia) Disposition:  Will need to stay during count recovery after completion of chemotherapy. Expect hospitalization for several weeks. Upon discharge will need twice weekly labs w transfusions as needed. Weekly provider visits. RTC within 1 week from discharge. Jodie Taylor NP [facility] Hematology & Oncology [address] [address] Office : [phone] Fax : [phone] Attending Addendum: 
I have personally performed a face to face diagnostic evaluation on this patient. I have reviewed and agree with the care plan as documented above by Kristina Machado N.P.  My findings are as follows: Patient appears stable, heart rate regular without murmurs, abdomen is non-tender, bowel sounds are positive. 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 GCSF support started. Ongoing pancytopenia, transfuse blood products as needed. Right elbow discomfort with x-rays negative, now resolved. . Continue prophylactic antibiotics including Levaquin, acyclovir and voriconazole. More fatigued today. Imodium helping w loose stools. Awaiting count recovery. We will plan to keep in-house to hopeful counts recovery. Ambulation encouraged Total time 35 min 50% in direct consultation about the patient's diagnosis and management Mamie Degroot MD 
[facility] Hematology/Oncology [address] [place] [address] Office : [phone] Fax : [phone]

## 2019-11-16 NOTE — PROGRESS NOTES
0645-Bedside report received from Leticia Palafox RN. Sleeping in bed. No needs voiced. No s/s of acute distress. 1310-END OF SHIFT NOTE: 
Pt's VSS and is in no acute distress. Pt still having some nausea and received one dose of ativan this am.  
Intake/Output 11/16 0701 - 11/16 1900 In: -  
Out: 925 [Urine:925] Voiding: YES Catheter: NO 
Drain:   
 
 
Stool:  1 occurrences. Stool Assessment Stool Color: Brown (11/16/19 1200) Stool Appearance: Soft (11/16/19 1200) Stool Amount: Small (11/16/19 1200) Stool Source/Status: Rectum (11/16/19 1200) Emesis:  0 occurrences. VITAL SIGNS Patient Vitals for the past 12 hrs: 
 Temp Pulse Resp BP SpO2  
11/16/19 1200 98.7 °F (37.1 °C) 95 18 95/65 98 % 11/16/19 0712 98.5 °F (36.9 °C) 79 18 145/75 98 % 11/16/19 0517 98.6 °F (37 °C) 66 18 152/75 98 % Pain Assessment Pain 1 Pain Scale 1: Numeric (0 - 10) (11/16/19 2685) Pain Intensity 1: 0 (11/16/19 1838) Patient Stated Pain Goal: 0 (11/16/19 1298) Pain Reassessment 1: Patient resting w/respiratory rate greater than 10 (11/13/19 0250) Pain Onset 1: suddenly (11/10/19 1000) Pain Location 1: Leg (11/11/19 0505) Pain Orientation 1: Left;Right (11/11/19 0505) Pain Description 1: Aching (11/11/19 0505) Pain Intervention(s) 1: Medication (see MAR) (11/11/19 0505) Ambulating Yes Mackenzie Rai Bedside shift change report given to Iraj Andino RN (oncoming nurse) by Derrek Montoya RN (offgoing nurse). Report included the following information SBAR, Kardex, Intake/Output, MAR and Recent Results.

## 2019-11-16 NOTE — PROGRESS NOTES
END OF SHIFT NOTE: 
 
Intake/Output 11/15 1901 - 700 In: 240 [P.O.:240] Out: 1950 [QVDY] Voiding: YES Catheter: NO 
Drain:   
 
Stool:  0 occurrences. Stool Assessment Stool Color: Black; Raya Ped (19 1005) Stool Appearance: Loose (11/15/19 1941) Stool Amount: Small (19) Stool Source/Status: Rectum (19) Emesis:  0 occurrences. VITAL SIGNS Patient Vitals for the past 12 hrs: 
 Temp Pulse Resp BP SpO2  
19 0517 98.6 °F (37 °C) 66 18 152/75 98 % 11/15/19 2310 99.1 °F (37.3 °C) 75 16 143/77 99 % 11/15/19 1936 98.6 °F (37 °C) 93 16 142/61 99 % Pain Assessment Pain 1 Pain Scale 1: Numeric (0 - 10) (11/15/19 142) Pain Intensity 1: 0 (11/15/19 142) Patient Stated Pain Goal: 0 (11/15/19 0802) Pain Reassessment 1: Patient resting w/respiratory rate greater than 10 (19 0250) Pain Onset 1: suddenly (11/10/19 1000) Pain Location 1: Leg (19 0505) Pain Orientation 1: Left;Right (19 0505) Pain Description 1: Aching (19 0505) Pain Intervention(s) 1: Medication (see MAR) (19) Ambulating Yes Additional Information:   Pt has slept well, with good relief from generalized \"aches and pains\". Continues with transient nausea. Shift report given to oncoming nurse at the bedside.  
 
Flor Braga RN

## 2019-11-16 NOTE — PROGRESS NOTES
Problem: Falls - Risk of 
Goal: *Absence of Falls Description Document Mariel Valerio Fall Risk and appropriate interventions in the flowsheet. Outcome: Progressing Towards Goal 
Note:  
Fall Risk Interventions: 
Mobility Interventions: Communicate number of staff needed for ambulation/transfer Medication Interventions: Teach patient to arise slowly Elimination Interventions: Call light in reach History of Falls Interventions: Room close to nurse's station Problem: Patient Education: Go to Patient Education Activity Goal: Patient/Family Education Outcome: Progressing Towards Goal

## 2019-11-17 LAB
ALBUMIN SERPL-MCNC: 2.1 G/DL (ref 3.2–4.6)
ALBUMIN/GLOB SERPL: 0.7 {RATIO} (ref 1.2–3.5)
ALP SERPL-CCNC: 58 U/L (ref 50–136)
ALT SERPL-CCNC: 22 U/L (ref 12–65)
ANION GAP SERPL CALC-SCNC: 5 MMOL/L (ref 7–16)
APTT PPP: 29.6 SEC (ref 24.7–39.8)
AST SERPL-CCNC: 9 U/L (ref 15–37)
BILIRUB SERPL-MCNC: 0.3 MG/DL (ref 0.2–1.1)
BUN SERPL-MCNC: 18 MG/DL (ref 8–23)
CALCIUM SERPL-MCNC: 7.4 MG/DL (ref 8.3–10.4)
CHLORIDE SERPL-SCNC: 108 MMOL/L (ref 98–107)
CO2 SERPL-SCNC: 27 MMOL/L (ref 21–32)
CREAT SERPL-MCNC: 0.53 MG/DL (ref 0.6–1)
DIFFERENTIAL METHOD BLD: ABNORMAL
ERYTHROCYTE [DISTWIDTH] IN BLOOD BY AUTOMATED COUNT: 12.6 % (ref 11.9–14.6)
FIBRINOGEN PPP-MCNC: 423 MG/DL (ref 190–501)
GLOBULIN SER CALC-MCNC: 3.2 G/DL (ref 2.3–3.5)
GLUCOSE SERPL-MCNC: 119 MG/DL (ref 65–100)
HCT VFR BLD AUTO: 20.1 % (ref 35.8–46.3)
HGB BLD-MCNC: 6.9 G/DL (ref 11.7–15.4)
INR PPP: 1.2
LDH SERPL L TO P-CCNC: 298 U/L (ref 110–210)
MAGNESIUM SERPL-MCNC: 2.2 MG/DL (ref 1.8–2.4)
MCH RBC QN AUTO: 29.6 PG (ref 26.1–32.9)
MCHC RBC AUTO-ENTMCNC: 34.3 G/DL (ref 31.4–35)
MCV RBC AUTO: 86.3 FL (ref 79.6–97.8)
NRBC # BLD: 0 K/UL (ref 0–0.2)
PHOSPHATE SERPL-MCNC: 2.9 MG/DL (ref 2.3–3.7)
PLATELET # BLD AUTO: 16 K/UL (ref 150–450)
PLATELET COMMENTS,PCOM: ABNORMAL
PMV BLD AUTO: 10.7 FL (ref 9.4–12.3)
POTASSIUM SERPL-SCNC: 4.3 MMOL/L (ref 3.5–5.1)
PROT SERPL-MCNC: 5.3 G/DL (ref 6.3–8.2)
PROTHROMBIN TIME: 15.6 SEC (ref 11.7–14.5)
RBC # BLD AUTO: 2.33 M/UL (ref 4.05–5.2)
RBC MORPH BLD: ABNORMAL
SODIUM SERPL-SCNC: 140 MMOL/L (ref 136–145)
TOTAL CELLS COUNTED SPEC: 0
URATE SERPL-MCNC: 1.9 MG/DL (ref 2.6–6)
WBC # BLD AUTO: 0 K/UL (ref 4.3–11.1)
WBC MORPH BLD: ABNORMAL

## 2019-11-17 PROCEDURE — P9040 RBC LEUKOREDUCED IRRADIATED: HCPCS

## 2019-11-17 PROCEDURE — 85730 THROMBOPLASTIN TIME PARTIAL: CPT

## 2019-11-17 PROCEDURE — 74011250636 HC RX REV CODE- 250/636: Performed by: INTERNAL MEDICINE

## 2019-11-17 PROCEDURE — 84550 ASSAY OF BLOOD/URIC ACID: CPT

## 2019-11-17 PROCEDURE — 85025 COMPLETE CBC W/AUTO DIFF WBC: CPT

## 2019-11-17 PROCEDURE — 74011250636 HC RX REV CODE- 250/636: Performed by: NURSE PRACTITIONER

## 2019-11-17 PROCEDURE — 65270000015 HC RM PRIVATE ONCOLOGY

## 2019-11-17 PROCEDURE — 74011250637 HC RX REV CODE- 250/637: Performed by: NURSE PRACTITIONER

## 2019-11-17 PROCEDURE — 83615 LACTATE (LD) (LDH) ENZYME: CPT

## 2019-11-17 PROCEDURE — 99233 SBSQ HOSP IP/OBS HIGH 50: CPT | Performed by: INTERNAL MEDICINE

## 2019-11-17 PROCEDURE — 80053 COMPREHEN METABOLIC PANEL: CPT

## 2019-11-17 PROCEDURE — 86644 CMV ANTIBODY: CPT

## 2019-11-17 PROCEDURE — 65270000029 HC RM PRIVATE

## 2019-11-17 PROCEDURE — 36430 TRANSFUSION BLD/BLD COMPNT: CPT

## 2019-11-17 PROCEDURE — 85384 FIBRINOGEN ACTIVITY: CPT

## 2019-11-17 PROCEDURE — 85610 PROTHROMBIN TIME: CPT

## 2019-11-17 PROCEDURE — 77030020263 HC SOL INJ SOD CL0.9% LFCR 1000ML

## 2019-11-17 PROCEDURE — 74011250637 HC RX REV CODE- 250/637: Performed by: INTERNAL MEDICINE

## 2019-11-17 PROCEDURE — 83735 ASSAY OF MAGNESIUM: CPT

## 2019-11-17 PROCEDURE — 84100 ASSAY OF PHOSPHORUS: CPT

## 2019-11-17 RX ORDER — SODIUM CHLORIDE 9 MG/ML
250 INJECTION, SOLUTION INTRAVENOUS AS NEEDED
Status: DISCONTINUED | OUTPATIENT
Start: 2019-11-17 | End: 2019-11-18

## 2019-11-17 RX ADMIN — VORICONAZOLE 200 MG: 200 TABLET, FILM COATED ORAL at 20:04

## 2019-11-17 RX ADMIN — ACYCLOVIR 400 MG: 800 TABLET ORAL at 10:11

## 2019-11-17 RX ADMIN — PREDNISOLONE ACETATE 2 DROP: 10 SUSPENSION/ DROPS OPHTHALMIC at 05:00

## 2019-11-17 RX ADMIN — ONDANSETRON 4 MG: 2 INJECTION INTRAMUSCULAR; INTRAVENOUS at 18:38

## 2019-11-17 RX ADMIN — LORAZEPAM 0.5 MG: 2 INJECTION INTRAMUSCULAR; INTRAVENOUS at 05:46

## 2019-11-17 RX ADMIN — POTASSIUM & SODIUM PHOSPHATES POWDER PACK 280-160-250 MG 1 PACKET: 280-160-250 PACK at 10:10

## 2019-11-17 RX ADMIN — DIPHENHYDRAMINE HYDROCHLORIDE 25 MG: 25 CAPSULE ORAL at 12:18

## 2019-11-17 RX ADMIN — POTASSIUM & SODIUM PHOSPHATES POWDER PACK 280-160-250 MG 1 PACKET: 280-160-250 PACK at 21:06

## 2019-11-17 RX ADMIN — POTASSIUM & SODIUM PHOSPHATES POWDER PACK 280-160-250 MG 1 PACKET: 280-160-250 PACK at 18:25

## 2019-11-17 RX ADMIN — DEXAMETHASONE SODIUM PHOSPHATE 10 MG: 10 INJECTION, SOLUTION INTRAMUSCULAR; INTRAVENOUS at 11:00

## 2019-11-17 RX ADMIN — Medication 2 TABLET: at 12:17

## 2019-11-17 RX ADMIN — ACYCLOVIR 400 MG: 800 TABLET ORAL at 18:25

## 2019-11-17 RX ADMIN — LEVOFLOXACIN 500 MG: 500 TABLET, FILM COATED ORAL at 12:17

## 2019-11-17 RX ADMIN — DULOXETINE 30 MG: 30 CAPSULE, DELAYED RELEASE ORAL at 10:11

## 2019-11-17 RX ADMIN — ACETAMINOPHEN 650 MG: 325 TABLET, FILM COATED ORAL at 12:18

## 2019-11-17 RX ADMIN — SODIUM CHLORIDE 1000 ML: 900 INJECTION, SOLUTION INTRAVENOUS at 21:06

## 2019-11-17 RX ADMIN — PREDNISOLONE ACETATE 2 DROP: 10 SUSPENSION/ DROPS OPHTHALMIC at 12:19

## 2019-11-17 RX ADMIN — Medication 10 ML: at 21:07

## 2019-11-17 RX ADMIN — LOPERAMIDE HYDROCHLORIDE 2 MG: 2 CAPSULE ORAL at 12:18

## 2019-11-17 RX ADMIN — LORAZEPAM 1 MG: 1 TABLET ORAL at 21:06

## 2019-11-17 RX ADMIN — PREDNISOLONE ACETATE 2 DROP: 10 SUSPENSION/ DROPS OPHTHALMIC at 18:25

## 2019-11-17 RX ADMIN — VORICONAZOLE 200 MG: 200 TABLET, FILM COATED ORAL at 10:11

## 2019-11-17 RX ADMIN — PRAVASTATIN SODIUM 10 MG: 20 TABLET ORAL at 21:06

## 2019-11-17 RX ADMIN — PREDNISOLONE ACETATE 2 DROP: 10 SUSPENSION/ DROPS OPHTHALMIC at 23:13

## 2019-11-17 RX ADMIN — LORATADINE 10 MG: 10 TABLET ORAL at 12:17

## 2019-11-17 RX ADMIN — TBO-FILGRASTIM 480 MCG: 480 INJECTION, SOLUTION SUBCUTANEOUS at 21:06

## 2019-11-17 RX ADMIN — LOPERAMIDE HYDROCHLORIDE 2 MG: 2 CAPSULE ORAL at 20:04

## 2019-11-17 RX ADMIN — ALLOPURINOL 300 MG: 300 TABLET ORAL at 10:11

## 2019-11-17 RX ADMIN — SODIUM CHLORIDE 250 ML: 900 INJECTION, SOLUTION INTRAVENOUS at 12:20

## 2019-11-17 NOTE — PROGRESS NOTES
END OF SHIFT NOTE: 
 
Intake/Output No intake/output data recorded. Voiding: YES Catheter: NO 
Drain:   
 
 
 
 
 
Stool:  0 occurrences. Stool Assessment Stool Color: Brown (11/16/19 1453) Stool Appearance: Loose; Watery (11/16/19 1901) Stool Amount: Large (11/16/19 1453) Stool Source/Status: Rectum (11/16/19 1453) Emesis:  0 occurrences. VITAL SIGNS Patient Vitals for the past 12 hrs: 
 Temp Pulse Resp BP SpO2  
11/16/19 2231 98.5 °F (36.9 °C) 74 18 131/65 92 % 11/16/19 1957 98.4 °F (36.9 °C) 100 18 151/67 97 % Pain Assessment Pain 1 Pain Scale 1: Numeric (0 - 10) (11/16/19 1516) Pain Intensity 1: 0 (11/16/19 5720) Patient Stated Pain Goal: 0 (11/16/19 5529) Pain Reassessment 1: Patient resting w/respiratory rate greater than 10 (11/13/19 0250) Pain Onset 1: suddenly (11/10/19 1000) Pain Location 1: Leg (11/11/19 0505) Pain Orientation 1: Left;Right (11/11/19 0505) Pain Description 1: Aching (11/11/19 0505) Pain Intervention(s) 1: Medication (see MAR) (11/11/19 0505) Ambulating Yes Additional Information:    Patient medicated twice this shift for nausea with ativan. No vomiting. Have clustered her care for purpose of sleep. Pt requested this. Shift report given to oncoming nurse at the bedside.  
 
Mari New RN

## 2019-11-18 ENCOUNTER — APPOINTMENT (OUTPATIENT)
Dept: GENERAL RADIOLOGY | Age: 74
DRG: 837 | End: 2019-11-18
Attending: INTERNAL MEDICINE
Payer: MEDICARE

## 2019-11-18 LAB
ABO + RH BLD: NORMAL
ALBUMIN SERPL-MCNC: 2.2 G/DL (ref 3.2–4.6)
ALBUMIN/GLOB SERPL: 0.6 {RATIO} (ref 1.2–3.5)
ALP SERPL-CCNC: 64 U/L (ref 50–136)
ALT SERPL-CCNC: 31 U/L (ref 12–65)
ANION GAP SERPL CALC-SCNC: 6 MMOL/L (ref 7–16)
AST SERPL-CCNC: 13 U/L (ref 15–37)
BASOPHILS # BLD: 0 K/UL (ref 0–0.2)
BASOPHILS NFR BLD: 0 % (ref 0–2)
BILIRUB SERPL-MCNC: 0.4 MG/DL (ref 0.2–1.1)
BLD PROD TYP BPU: NORMAL
BLOOD GROUP ANTIBODIES SERPL: NORMAL
BPU ID: NORMAL
BUN SERPL-MCNC: 14 MG/DL (ref 8–23)
CALCIUM SERPL-MCNC: 7.3 MG/DL (ref 8.3–10.4)
CHLORIDE SERPL-SCNC: 109 MMOL/L (ref 98–107)
CO2 SERPL-SCNC: 25 MMOL/L (ref 21–32)
CREAT SERPL-MCNC: 0.55 MG/DL (ref 0.6–1)
CROSSMATCH RESULT,%XM: NORMAL
DIFFERENTIAL METHOD BLD: ABNORMAL
EOSINOPHIL # BLD: 0 K/UL (ref 0–0.8)
EOSINOPHIL NFR BLD: 0 % (ref 0.5–7.8)
ERYTHROCYTE [DISTWIDTH] IN BLOOD BY AUTOMATED COUNT: 12.6 % (ref 11.9–14.6)
GLOBULIN SER CALC-MCNC: 3.4 G/DL (ref 2.3–3.5)
GLUCOSE SERPL-MCNC: 135 MG/DL (ref 65–100)
HCT VFR BLD AUTO: 24.3 % (ref 35.8–46.3)
HGB BLD-MCNC: 8.4 G/DL (ref 11.7–15.4)
IMM GRANULOCYTES # BLD AUTO: 0 K/UL (ref 0–0.5)
IMM GRANULOCYTES NFR BLD AUTO: 0 % (ref 0–5)
LDH SERPL L TO P-CCNC: 299 U/L (ref 110–210)
LYMPHOCYTES # BLD: 0 K/UL (ref 0.5–4.6)
LYMPHOCYTES NFR BLD: 0 % (ref 13–44)
MAGNESIUM SERPL-MCNC: 2.1 MG/DL (ref 1.8–2.4)
MCH RBC QN AUTO: 30.1 PG (ref 26.1–32.9)
MCHC RBC AUTO-ENTMCNC: 34.6 G/DL (ref 31.4–35)
MCV RBC AUTO: 87.1 FL (ref 79.6–97.8)
MONOCYTES # BLD: 0 K/UL (ref 0.1–1.3)
MONOCYTES NFR BLD: 0 % (ref 4–12)
NEUTS SEG # BLD: 0 K/UL (ref 1.7–8.2)
NEUTS SEG NFR BLD: 100 % (ref 43–78)
NRBC # BLD: 0 K/UL (ref 0–0.2)
PHOSPHATE SERPL-MCNC: 3.1 MG/DL (ref 2.3–3.7)
PLATELET # BLD AUTO: 12 K/UL (ref 150–450)
PMV BLD AUTO: 11.4 FL (ref 9.4–12.3)
POTASSIUM SERPL-SCNC: 4.5 MMOL/L (ref 3.5–5.1)
PROT SERPL-MCNC: 5.6 G/DL (ref 6.3–8.2)
RBC # BLD AUTO: 2.79 M/UL (ref 4.05–5.2)
SODIUM SERPL-SCNC: 140 MMOL/L (ref 136–145)
SPECIMEN EXP DATE BLD: NORMAL
STATUS OF UNIT,%ST: NORMAL
UNIT DIVISION, %UDIV: 0
URATE SERPL-MCNC: 1.7 MG/DL (ref 2.6–6)
WBC # BLD AUTO: 0 K/UL (ref 4.3–11.1)

## 2019-11-18 PROCEDURE — 87205 SMEAR GRAM STAIN: CPT

## 2019-11-18 PROCEDURE — 77030020263 HC SOL INJ SOD CL0.9% LFCR 1000ML

## 2019-11-18 PROCEDURE — 74011000258 HC RX REV CODE- 258: Performed by: INTERNAL MEDICINE

## 2019-11-18 PROCEDURE — 74011250637 HC RX REV CODE- 250/637: Performed by: INTERNAL MEDICINE

## 2019-11-18 PROCEDURE — 99233 SBSQ HOSP IP/OBS HIGH 50: CPT | Performed by: INTERNAL MEDICINE

## 2019-11-18 PROCEDURE — 74011250637 HC RX REV CODE- 250/637: Performed by: NURSE PRACTITIONER

## 2019-11-18 PROCEDURE — 84550 ASSAY OF BLOOD/URIC ACID: CPT

## 2019-11-18 PROCEDURE — 87077 CULTURE AEROBIC IDENTIFY: CPT

## 2019-11-18 PROCEDURE — 85025 COMPLETE CBC W/AUTO DIFF WBC: CPT

## 2019-11-18 PROCEDURE — 84100 ASSAY OF PHOSPHORUS: CPT

## 2019-11-18 PROCEDURE — 74011000250 HC RX REV CODE- 250: Performed by: NURSE PRACTITIONER

## 2019-11-18 PROCEDURE — 80053 COMPREHEN METABOLIC PANEL: CPT

## 2019-11-18 PROCEDURE — 71045 X-RAY EXAM CHEST 1 VIEW: CPT

## 2019-11-18 PROCEDURE — 65270000029 HC RM PRIVATE

## 2019-11-18 PROCEDURE — 74011250636 HC RX REV CODE- 250/636: Performed by: NURSE PRACTITIONER

## 2019-11-18 PROCEDURE — 86923 COMPATIBILITY TEST ELECTRIC: CPT

## 2019-11-18 PROCEDURE — 83735 ASSAY OF MAGNESIUM: CPT

## 2019-11-18 PROCEDURE — 65270000015 HC RM PRIVATE ONCOLOGY

## 2019-11-18 PROCEDURE — 74011250636 HC RX REV CODE- 250/636: Performed by: INTERNAL MEDICINE

## 2019-11-18 PROCEDURE — 86900 BLOOD TYPING SEROLOGIC ABO: CPT

## 2019-11-18 PROCEDURE — 87150 DNA/RNA AMPLIFIED PROBE: CPT

## 2019-11-18 PROCEDURE — 83615 LACTATE (LD) (LDH) ENZYME: CPT

## 2019-11-18 PROCEDURE — 87186 SC STD MICRODIL/AGAR DIL: CPT

## 2019-11-18 PROCEDURE — 36591 DRAW BLOOD OFF VENOUS DEVICE: CPT

## 2019-11-18 PROCEDURE — 36415 COLL VENOUS BLD VENIPUNCTURE: CPT

## 2019-11-18 PROCEDURE — 87040 BLOOD CULTURE FOR BACTERIA: CPT

## 2019-11-18 RX ORDER — VANCOMYCIN HYDROCHLORIDE
1250
Status: DISCONTINUED | OUTPATIENT
Start: 2019-11-19 | End: 2019-11-19

## 2019-11-18 RX ORDER — MAG HYDROX/ALUMINUM HYD/SIMETH 200-200-20
30 SUSPENSION, ORAL (FINAL DOSE FORM) ORAL
Status: DISCONTINUED | OUTPATIENT
Start: 2019-11-18 | End: 2019-12-12 | Stop reason: HOSPADM

## 2019-11-18 RX ORDER — SODIUM CHLORIDE 9 MG/ML
250 INJECTION, SOLUTION INTRAVENOUS AS NEEDED
Status: DISCONTINUED | OUTPATIENT
Start: 2019-11-18 | End: 2019-11-24

## 2019-11-18 RX ORDER — VANCOMYCIN 2 GRAM/500 ML IN 0.9 % SODIUM CHLORIDE INTRAVENOUS
2000 ONCE
Status: COMPLETED | OUTPATIENT
Start: 2019-11-18 | End: 2019-11-18

## 2019-11-18 RX ORDER — DEXAMETHASONE SODIUM PHOSPHATE 100 MG/10ML
10 INJECTION INTRAMUSCULAR; INTRAVENOUS EVERY 24 HOURS
Status: DISCONTINUED | OUTPATIENT
Start: 2019-11-18 | End: 2019-11-18

## 2019-11-18 RX ADMIN — ACYCLOVIR 400 MG: 800 TABLET ORAL at 08:37

## 2019-11-18 RX ADMIN — LORAZEPAM 1 MG: 1 TABLET ORAL at 21:10

## 2019-11-18 RX ADMIN — TBO-FILGRASTIM 480 MCG: 480 INJECTION, SOLUTION SUBCUTANEOUS at 21:10

## 2019-11-18 RX ADMIN — LEVOFLOXACIN 500 MG: 500 TABLET, FILM COATED ORAL at 14:55

## 2019-11-18 RX ADMIN — Medication 10 ML: at 21:10

## 2019-11-18 RX ADMIN — ACETAMINOPHEN 650 MG: 325 TABLET, FILM COATED ORAL at 20:05

## 2019-11-18 RX ADMIN — LOPERAMIDE HYDROCHLORIDE 2 MG: 2 CAPSULE ORAL at 10:26

## 2019-11-18 RX ADMIN — ONDANSETRON 4 MG: 2 INJECTION INTRAMUSCULAR; INTRAVENOUS at 14:55

## 2019-11-18 RX ADMIN — POTASSIUM & SODIUM PHOSPHATES POWDER PACK 280-160-250 MG 1 PACKET: 280-160-250 PACK at 08:36

## 2019-11-18 RX ADMIN — DULOXETINE 30 MG: 30 CAPSULE, DELAYED RELEASE ORAL at 08:36

## 2019-11-18 RX ADMIN — ALUMINUM HYDROXIDE, MAGNESIUM HYDROXIDE, AND SIMETHICONE 30 ML: 200; 200; 20 SUSPENSION ORAL at 02:21

## 2019-11-18 RX ADMIN — LOPERAMIDE HYDROCHLORIDE 2 MG: 2 CAPSULE ORAL at 14:55

## 2019-11-18 RX ADMIN — PREDNISOLONE ACETATE 2 DROP: 10 SUSPENSION/ DROPS OPHTHALMIC at 06:28

## 2019-11-18 RX ADMIN — PROCHLORPERAZINE EDISYLATE 5 MG: 5 INJECTION INTRAMUSCULAR; INTRAVENOUS at 16:32

## 2019-11-18 RX ADMIN — ONDANSETRON 4 MG: 2 INJECTION INTRAMUSCULAR; INTRAVENOUS at 07:57

## 2019-11-18 RX ADMIN — LOPERAMIDE HYDROCHLORIDE 2 MG: 2 CAPSULE ORAL at 21:10

## 2019-11-18 RX ADMIN — VORICONAZOLE 200 MG: 200 TABLET, FILM COATED ORAL at 08:36

## 2019-11-18 RX ADMIN — PRAVASTATIN SODIUM 10 MG: 20 TABLET ORAL at 21:10

## 2019-11-18 RX ADMIN — Medication 2 TABLET: at 08:36

## 2019-11-18 RX ADMIN — ACYCLOVIR 400 MG: 800 TABLET ORAL at 18:49

## 2019-11-18 RX ADMIN — VANCOMYCIN HYDROCHLORIDE 2000 MG: 10 INJECTION, POWDER, LYOPHILIZED, FOR SOLUTION INTRAVENOUS at 21:10

## 2019-11-18 RX ADMIN — SODIUM CHLORIDE 1000 ML: 900 INJECTION, SOLUTION INTRAVENOUS at 21:10

## 2019-11-18 RX ADMIN — ALLOPURINOL 300 MG: 300 TABLET ORAL at 08:36

## 2019-11-18 RX ADMIN — CEFEPIME 2 G: 2 INJECTION, POWDER, FOR SOLUTION INTRAVENOUS at 20:05

## 2019-11-18 RX ADMIN — VORICONAZOLE 200 MG: 200 TABLET, FILM COATED ORAL at 20:06

## 2019-11-18 RX ADMIN — LORATADINE 10 MG: 10 TABLET ORAL at 08:36

## 2019-11-18 NOTE — PROGRESS NOTES
Problem: Falls - Risk of 
Goal: *Absence of Falls Description Document Jeramy Parsons Fall Risk and appropriate interventions in the flowsheet. Outcome: Progressing Towards Goal 
Note:  
Fall Risk Interventions: 
Mobility Interventions: Strengthening exercises (ROM-active/passive) Medication Interventions: Teach patient to arise slowly Elimination Interventions: Call light in reach, Patient to call for help with toileting needs, Toileting schedule/hourly rounds History of Falls Interventions: Room close to nurse's station

## 2019-11-18 NOTE — PROGRESS NOTES
Chart screened by  for discharge planning. Pt is actively in count recovery. Discharge is dependant count recovery at this time. No needs identified at this time. Please consult  if any new issues arise Case Management will continue to follow.

## 2019-11-18 NOTE — PROGRESS NOTES
[facility] Inpatient Hematology / Oncology Daily Progress Note Reason for Admission:  Admission for antineoplastic chemotherapy [Z51.11] 24 Hour Events: 
Afebrile, VSS Day 11 FLAG-VENITA Fatigued  at bedside ROS: 
Constitutional: +fatigue Negative for fever, chills, weakness, malaise. CV: Negative for chest pain, palpitations, edema. Respiratory: Negative for dyspnea, cough, wheezing. GI: Negative for nausea, abdominal pain. 10 point review of systems is otherwise negative with the exception of the elements mentioned above in the HPI. No Known Allergies Past Medical History:  
Diagnosis Date  AML (acute myeloid leukemia) ([facility] [address] dx- 4/2019  
 followed by dr Tawny Chavez  Depression  Hypercholesterolemia  Infection   
 of port -- was placed 5/2019-- removed 6/2019---right chest  
 Psychiatric disorder   
 aniexty  Sleep apnea Past Surgical History:  
Procedure Laterality Date  HX OTHER SURGICAL    
 colonoscopy  HX VASCULAR ACCESS    
 IR INSERT TUNL CVC W PORT OVER 5 YEARS  4/30/2019  IR INSERT TUNL CVC W PORT OVER 5 YEARS  7/15/2019  IR REMOVE TUNL CVAD W PORT/PUMP  6/13/2019 Family History Problem Relation Age of Onset  Cancer Father Social History Socioeconomic History  Marital status:  Spouse name: Not on file  Number of children: Not on file  Years of education: Not on file  Highest education level: Not on file Occupational History  Not on file Social Needs  Financial resource strain: Not on file  Food insecurity:  
  Worry: Not on file Inability: Not on file  Transportation needs:  
  Medical: Not on file Non-medical: Not on file Tobacco Use  Smoking status: Never Smoker  Smokeless tobacco: Never Used Substance and Sexual Activity  Alcohol use: Never Frequency: Never  Drug use: Never  Sexual activity: Not on file Lifestyle  Physical activity:  
  Days per week: Not on file Minutes per session: Not on file  Stress: Not on file Relationships  Social connections:  
  Talks on phone: Not on file Gets together: Not on file Attends Zoroastrian service: Not on file Active member of club or organization: Not on file Attends meetings of clubs or organizations: Not on file Relationship status: Not on file  Intimate partner violence:  
  Fear of current or ex partner: Not on file Emotionally abused: Not on file Physically abused: Not on file Forced sexual activity: Not on file Other Topics Concern [address] Service Not Asked  Blood Transfusions Not Asked  Caffeine Concern Not Asked  Occupational Exposure Not Asked Vanessa Blizzard Hazards Not Asked  Sleep Concern Not Asked  Stress Concern Not Asked  Weight Concern Not Asked  Special Diet Not Asked  Back Care Not Asked  Exercise Not Asked  Bike Helmet Not Asked  Seat Belt Not Asked  Self-Exams Not Asked Social History Narrative  Not on file Current Facility-Administered Medications Medication Dose Route Frequency Provider Last Rate Last Dose  alum-mag hydroxide-simeth (MYLANTA) oral suspension 30 mL  30 mL Oral Q4H PRN Adela Brush MD   30 mL at 11/18/19 0221  
 dexamethasone (DECADRON) injection 10 mg  10 mg IntraVENous Q24H Adela Brush MD      
 loperamide (IMODIUM) capsule 2 mg  2 mg Oral Q4H PRN Adela Brush MD   2 mg at 11/18/19 1026  loratadine (CLARITIN) tablet 10 mg  10 mg Oral DAILY Marquez Palma NP   10 mg at 11/18/19 5936  sodium chloride 0.9 % bolus infusion 1,000 mL  1,000 mL IntraVENous QHS Marquez Palma NP   Stopped at 11/17/19 2300  central line flush (saline) syringe 10 mL  10 mL InterCATHeter PRN Adela Brush MD   10 mL at 11/17/19 2107  docusate sodium (COLACE) capsule 100 mg  100 mg Oral BID PRN Maya Valderrama NP   100 mg at 11/11/19 1308  LORazepam (ATIVAN) injection 0.5 mg  0.5 mg IntraVENous Q6H PRN Kerry Minor MD   0.5 mg at 11/17/19 0546  
 saline peripheral flush soln 10 mL  10 mL InterCATHeter PRN Kerry Minor MD   10 mL at 11/13/19 2154  heparin (porcine) pf 300-500 Units  300-500 Units InterCATHeter PRN Kerry Minor MD      
 tbo-filgrastim [facility]) injection 480 mcg  480 mcg SubCUTAneous QHS Kerry Minor MD   480 mcg at 11/17/19 2106  acyclovir (ZOVIRAX) tablet 400 mg  400 mg Oral BID Myah Sanes, NP   400 mg at 11/18/19 0837  
 allopurinol (ZYLOPRIM) tablet 300 mg  300 mg Oral DAILY Myah Sanes, NP   300 mg at 11/18/19 8647  cholecalciferol (VITAMIN D3) (400 Units /10 mcg) tablet 2 Tab  800 Units Oral DAILY Myah Sanes, NP   2 Tab at 11/18/19 4622  DULoxetine (CYMBALTA) capsule 30 mg  30 mg Oral DAILY Myah Sanes, NP   30 mg at 11/18/19 9350  lidocaine-prilocaine (EMLA) 2.5-2.5 % cream   Topical PRN Myah Surinder, NP      
 LORazepam (ATIVAN) tablet 1 mg  1 mg Oral QHS Myah Sanes, NP   1 mg at 11/17/19 2106  pravastatin (PRAVACHOL) tablet 10 mg  10 mg Oral QHS Myah Sanes, NP   10 mg at 11/17/19 2106  voriconazole (VFEND) tablet 200 mg  200 mg Oral Q12H Myah Sanes, NP   200 mg at 11/18/19 7961  ondansetron (ZOFRAN) injection 4 mg  4 mg IntraVENous Q4H PRN Myah Sanes, NP   4 mg at 11/18/19 4999  prochlorperazine (COMPAZINE) with saline injection 5 mg  5 mg IntraVENous Q6H PRN Myah Surinder, NP      
 HYDROcodone-acetaminophen [facility]) 5-325 mg per tablet 1 Tab  1 Tab Oral Q6H PRN Myah Sanes, NP   1 Tab at 11/11/19 6822  morphine injection 2 mg  2 mg IntraVENous Q4H PRN Myah Cadena NP      
 acetaminophen (TYLENOL) tablet 650 mg  650 mg Oral Q6H PRN Kerry Minor MD   650 mg at 11/17/19 1218  diphenhydrAMINE (BENADRYL) capsule 25 mg  25 mg Oral Q6H PRN Kerry Minor MD   25 mg at 11/17/19 1218  levoFLOXacin (LEVAQUIN) tablet 500 mg  500 mg Oral Q24H Myah Cadena NP   500 mg at 11/17/19 1217  acetaminophen/diphenhydrAMINE (TYLENOL PM EXT STR) 500/25 mg (Patient Supplied)   Oral QHS Ayad Brooks MD      
 vit C,J-Pk-qzrsl-lutein-zeaxan (PRESERVISION AREDS-2) capsule 1 Cap (Patient Supplied)  [address] Chip Fuel, MD   1 Cap at 19 4136 OBJECTIVE: 
Patient Vitals for the past 8 hrs: 
 BP Temp Pulse Resp SpO2  
19 1133 148/63 98.1 °F (36.7 °C) 86 19 99 % 19 0803 134/61 98.4 °F (36.9 °C) 82 17 96 % Temp (24hrs), Av.9 °F (37.2 °C), Min:98.1 °F (36.7 °C), Max:99.7 °F (37.6 °C) 
 
 0701 -  1900 In: 360 [P.O.:360] Out: 500 [Urine:500] Physical Exam: 
Constitutional: Well developed, well nourished female in no acute distress, sitting comfortably in the hospital bed. HEENT: Normocephalic and atraumatic. Oropharynx is clear, mucous membranes are moist. Extraocular muscles are intact. Sclerae anicteric. Neck supple without JVD. No thyromegaly present. Skin Warm and dry. No rash noted. No erythema. No pallor. Bruising noted on BUE/R elbow and abdomen. Respiratory Lungs are clear to auscultation bilaterally without wheezes, rales or rhonchi, normal air exchange without accessory muscle use. CVS Normal rate, regular rhythm and normal S1 and S2. No murmurs, gallops, or rubs. Abdomen Soft, mildly tender across lower abd-improving, nondistended, normoactive bowel sounds. No palpable mass. No hepatosplenomegaly. Neuro Grossly nonfocal with no obvious sensory or motor deficits. MSK Normal range of motion in general.  No edema and no tenderness. Psych Appropriate mood and affect. Labs:   
Recent Results (from the past 24 hour(s)) METABOLIC PANEL, COMPREHENSIVE Collection Time: 19  2:11 AM  
Result Value Ref Range Sodium 140 136 - 145 mmol/L Potassium 4.5 3.5 - 5.1 mmol/L Chloride 109 (H) 98 - 107 mmol/L  
 CO2 25 21 - 32 mmol/L Anion gap 6 (L) 7 - 16 mmol/L Glucose 135 (H) 65 - 100 mg/dL BUN 14 8 - 23 MG/DL Creatinine 0.55 (L) 0.6 - 1.0 MG/DL  
 GFR est AA >60 >60 ml/min/1.73m2 GFR est non-AA >60 >60 ml/min/1.73m2 Calcium 7.3 (L) 8.3 - 10.4 MG/DL Bilirubin, total 0.4 0.2 - 1.1 MG/DL  
 ALT (SGPT) 31 12 - 65 U/L  
 AST (SGOT) 13 (L) 15 - 37 U/L Alk. phosphatase 64 50 - 136 U/L Protein, total 5.6 (L) 6.3 - 8.2 g/dL Albumin 2.2 (L) 3.2 - 4.6 g/dL Globulin 3.4 2.3 - 3.5 g/dL A-G Ratio 0.6 (L) 1.2 - 3.5 MAGNESIUM Collection Time: 11/18/19  2:11 AM  
Result Value Ref Range Magnesium 2.1 1.8 - 2.4 mg/dL LD Collection Time: 11/18/19  2:11 AM  
Result Value Ref Range  (H) 110 - 210 U/L  
URIC ACID Collection Time: 11/18/19  2:11 AM  
Result Value Ref Range Uric acid 1.7 (L) 2.6 - 6.0 MG/DL  
PHOSPHORUS Collection Time: 11/18/19  2:11 AM  
Result Value Ref Range Phosphorus 3.1 2.3 - 3.7 MG/DL  
CBC WITH AUTOMATED DIFF Collection Time: 11/18/19  2:11 AM  
Result Value Ref Range WBC 0.0 (LL) 4.3 - 11.1 K/uL  
 RBC 2.79 (L) 4.05 - 5.2 M/uL HGB 8.4 (L) 11.7 - 15.4 g/dL HCT 24.3 (L) 35.8 - 46.3 % MCV 87.1 79.6 - 97.8 FL  
 MCH 30.1 26.1 - 32.9 PG  
 MCHC 34.6 31.4 - 35.0 g/dL  
 RDW 12.6 11.9 - 14.6 % PLATELET 12 (LL) 688 - 450 K/uL MPV 11.4 9.4 - 12.3 FL ABSOLUTE NRBC 0.00 0.0 - 0.2 K/uL  
 DF AUTOMATED NEUTROPHILS 100 (H) 43 - 78 % LYMPHOCYTES 0 (L) 13 - 44 % MONOCYTES 0 (L) 4.0 - 12.0 % EOSINOPHILS 0 (L) 0.5 - 7.8 % BASOPHILS 0 0.0 - 2.0 % IMMATURE GRANULOCYTES 0 0.0 - 5.0 %  
 ABS. NEUTROPHILS 0.0 (L) 1.7 - 8.2 K/UL  
 ABS. LYMPHOCYTES 0.0 (L) 0.5 - 4.6 K/UL  
 ABS. MONOCYTES 0.0 (L) 0.1 - 1.3 K/UL  
 ABS. EOSINOPHILS 0.0 0.0 - 0.8 K/UL  
 ABS. BASOPHILS 0.0 0.0 - 0.2 K/UL  
 ABS. IMM. GRANS. 0.0 0.0 - 0.5 K/UL  
TYPE & SCREEN Collection Time: 11/18/19  2:11 AM  
Result Value Ref Range Crossmatch Expiration 11/21/2019 ABO/Rh(D) AB POSITIVE Antibody screen NEG Imaging: XR ELBOW RT MIN 3 V [[id number]] Collected: 11/10/19 1421 Order Status: Completed Updated: 11/10/19 1424 Narrative:    
Right elbow Clinical location: Elbow pain after feeling a pop, decreased range of motion FINDINGS: Four views of the right elbow show no fracture or dislocation. There 
is no joint effusion. The soft tissues are unremarkable. Impression:    
IMPRESSION: No acute osseous abnormality or joint derangement of the right 
elbow. ASSESSMENT: 
Problem List  Date Reviewed: 10/31/2019 Codes Class Noted Febrile neutropenia (HCC) ICD-10-CM: D70.9, R50.81 ICD-9-CM: 288.00, 780.61  9/21/2019 Pancytopenia due to antineoplastic chemotherapy [facility]) ICD-10-CM: D61.810, T45.1X5A 
ICD-9-CM: 284.11, E933.1  6/12/2019 Cellulitis of neck ICD-10-CM: K21.993 ICD-9-CM: 682.1  6/12/2019 Immunocompromised status associated with infection ICD-10-CM: B99.9 ICD-9-CM: 136.9  6/12/2019 Port or reservoir infection ICD-10-CM: Y63.537T ICD-9-CM: 999.33  6/12/2019 Acute myeloid leukemia not having achieved remission ([facility] ICD-10-CM: C92.00 ICD-9-CM: 205.00  5/9/2019 Admission for antineoplastic chemotherapy ICD-10-CM: Z51.11 ICD-9-CM: V58.11  5/5/2019 AML (acute myeloblastic leukemia) ([facility] [address] ICD-10-CM: C92.00 ICD-9-CM: 205.00  4/28/2019 Weakness generalized ICD-10-CM: R53.1 ICD-9-CM: 780.79  4/28/2019 Pancytopenia ([facility] ICD-10-CM: K31.676 ICD-9-CM: 284.19  4/28/2019 Thrombocytopenia ([facility] ICD-10-CM: D69.6 ICD-9-CM: 287.5  4/27/2019 Ms. Braden Oconnor is a 68 y.o. female admitted on 11/7/2019. She is a known patient of Dr. 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 She failed induction with 7+3/Midostaurin. On Dacogen/Gilteritinib with recent BMbx with persistent residual disease. She is being admitted for FLAG-VENITA. She is feeling well and is ready to proceed with treatment. PLAN: 
AML - admit for salvage FLAG-VENITA 
- needs Echo prior - ordered 11/8 Day 1 FLAG-VENITA. Echo with EF 55-60%, proceed with tx. 
11/9 Day 2 FLAG-VENITA. Tolerating well, no issues. Uric acid 3.1 today. 11/10 Day 3 FLAG-VENITA. No issues with treatment. Uric acid 3.3 today. 11/12 Day 5 FLAG-VENITA. Tolerating treatment well. G-CSF to start tomorrow. 11/13 Day 6 FLAG-VENITA, doing well, Granix/claritin starts today 11/18 Day 11 FLAG-VENITA. Awaiting count recovery, con't Granix. Pancytopenia secondary to disease - Transfuse prn per [facility] SOPs R elbow pain 11/11 c/o R elbow pain with limited ROM yesterday. Xray neg. Pain improved today, noted bruising. Normal ROM on exam. 
 
Mild Abd discomfort/loose stools 11/13 discomfort is improved from yesterday, will monitor 11/14 loose stools, but not watery, can use Imodium prn 
11/15 Imodium working well  
11/16 controlled Continue home meds Prophylactic Antibx: Acyclovir, Voriconazole, Levaquin [facility] SOPs SCDs for DVT prophylaxis (AC contraindicated d/t thrombocytopenia) Goals and plan of care reviewed with the patient. All questions answered to the best of our ability. Disposition:  Awaiting count recovery. Will need BMbx prior to discharge. Upon discharge will need twice weekly labs w transfusions as needed. Weekly provider visits. RTC within 1 week from discharge. James Hess NP [facility] [address] [place] [address] Office : [phone] Fax : [phone] Attending Addendum: 
I have personally performed a face to face diagnostic evaluation on this patient.  I have reviewed and agree with the care plan as documented by James Hess, N.P. 36 minutes were spent on patient care, including but not limited to, reviewing the chart and time with the patient and family, more than 50% of the time documented was spent in face-to-face contact with the patient and in the care of the patient on the floor/unit where the patient is located. My findings are as follows: She has AML and is Day 11 of FLAG-VENITA, appears weak, heart rate regular without murmurs, abdomen is non-tender, bowel sounds are positive, we will continue ABX and transfuse as needed. Patricia Raines MD 
 
 
[facility] Hematology/Oncology [address] [place] [address] Office : [phone] Fax : [phone]

## 2019-11-18 NOTE — PROGRESS NOTES
END OF SHIFT NOTE: 
 
Intake/Output 11/17 1901 - 11/18 0700 In: 1440 [P.O.:420; I.V.:1000] Out: 1450 [KRAPX:2099] Voiding: YES Catheter: NO 
Drain:   
 
 
 
 
 
Stool:  1 occurrences. Stool Assessment Stool Color: Brown(per pt) (11/17/19 2007) Stool Appearance: Loose(per pt) (11/17/19 2007) Stool Amount: Small(per pt) (11/17/19 2007) Stool Source/Status: Rectum (11/17/19 2007) Emesis:  0 occurrences. VITAL SIGNS Patient Vitals for the past 12 hrs: 
 Temp Pulse Resp BP SpO2  
11/18/19 0230 98.6 °F (37 °C) 68 18 150/77 94 % 11/17/19 2330 98.7 °F (37.1 °C) 72 18 135/69 93 % 11/17/19 1831 98.7 °F (37.1 °C) 79 16 137/63 96 % Pain Assessment Pain 1 Pain Scale 1: Numeric (0 - 10) (11/18/19 0230) Pain Intensity 1: 0 (11/18/19 0230) Patient Stated Pain Goal: 0 (11/18/19 0230) Pain Reassessment 1: Patient resting w/respiratory rate greater than 10 (11/13/19 0250) Pain Onset 1: suddenly (11/10/19 1000) Pain Location 1: Leg (11/11/19 0505) Pain Orientation 1: Left;Right (11/11/19 0505) Pain Description 1: Aching (11/11/19 0505) Pain Intervention(s) 1: Medication (see MAR) (11/11/19 0505) Ambulating Yes Additional Information: Afebrile overnight. No bleeding. Had 1x loose BM. Intermittent nausea. Feeling tired/fatigued. Shift report given to oncoming nurse Lucie Yusuf at the bedside.  
 
Nuzhat Mann RN

## 2019-11-19 ENCOUNTER — APPOINTMENT (OUTPATIENT)
Dept: CT IMAGING | Age: 74
DRG: 837 | End: 2019-11-19
Attending: NURSE PRACTITIONER
Payer: MEDICARE

## 2019-11-19 LAB
ACC. NO. FROM MICRO ORDER, ACCP: ABNORMAL
ALBUMIN SERPL-MCNC: 1.8 G/DL (ref 3.2–4.6)
ALBUMIN/GLOB SERPL: 0.5 {RATIO} (ref 1.2–3.5)
ALP SERPL-CCNC: 57 U/L (ref 50–136)
ALT SERPL-CCNC: 24 U/L (ref 12–65)
ANION GAP SERPL CALC-SCNC: 8 MMOL/L (ref 7–16)
APPEARANCE UR: ABNORMAL
APTT PPP: 37.8 SEC (ref 24.7–39.8)
AST SERPL-CCNC: 11 U/L (ref 15–37)
BACTERIA URNS QL MICRO: ABNORMAL /HPF
BILIRUB SERPL-MCNC: 0.4 MG/DL (ref 0.2–1.1)
BILIRUB UR QL: NEGATIVE
BUN SERPL-MCNC: 12 MG/DL (ref 8–23)
CALCIUM SERPL-MCNC: 7 MG/DL (ref 8.3–10.4)
CASTS URNS QL MICRO: ABNORMAL /LPF
CHLORIDE SERPL-SCNC: 109 MMOL/L (ref 98–107)
CLINICAL CONSIDERATION: ABNORMAL
CO2 SERPL-SCNC: 24 MMOL/L (ref 21–32)
COLOR UR: ABNORMAL
CREAT SERPL-MCNC: 0.59 MG/DL (ref 0.6–1)
D DIMER PPP FEU-MCNC: 3.82 UG/ML(FEU)
DIFFERENTIAL METHOD BLD: ABNORMAL
ENTEROCOCCUS, ENTP: DETECTED
EPI CELLS #/AREA URNS HPF: ABNORMAL /HPF
ERYTHROCYTE [DISTWIDTH] IN BLOOD BY AUTOMATED COUNT: 13.3 % (ref 11.9–14.6)
FIBRINOGEN PPP-MCNC: 583 MG/DL (ref 190–501)
GLOBULIN SER CALC-MCNC: 3.4 G/DL (ref 2.3–3.5)
GLUCOSE SERPL-MCNC: 95 MG/DL (ref 65–100)
GLUCOSE UR STRIP.AUTO-MCNC: NEGATIVE MG/DL
HCT VFR BLD AUTO: 20.2 % (ref 35.8–46.3)
HGB BLD-MCNC: 6.9 G/DL (ref 11.7–15.4)
HGB UR QL STRIP: ABNORMAL
INR PPP: 1.4
KETONES UR QL STRIP.AUTO: NEGATIVE MG/DL
LDH SERPL L TO P-CCNC: 261 U/L (ref 110–210)
LEUKOCYTE ESTERASE UR QL STRIP.AUTO: NEGATIVE
MAGNESIUM SERPL-MCNC: 1.7 MG/DL (ref 1.8–2.4)
MCH RBC QN AUTO: 30.1 PG (ref 26.1–32.9)
MCHC RBC AUTO-ENTMCNC: 34.2 G/DL (ref 31.4–35)
MCV RBC AUTO: 88.2 FL (ref 79.6–97.8)
NITRITE UR QL STRIP.AUTO: NEGATIVE
NRBC # BLD: 0 K/UL (ref 0–0.2)
PH UR STRIP: 6.5 [PH] (ref 5–9)
PHOSPHATE SERPL-MCNC: 2.2 MG/DL (ref 2.3–3.7)
PLATELET # BLD AUTO: 40 K/UL (ref 150–450)
PLATELET COMMENTS,PCOM: ABNORMAL
PMV BLD AUTO: 10.3 FL (ref 9.4–12.3)
POTASSIUM SERPL-SCNC: 3.8 MMOL/L (ref 3.5–5.1)
PROT SERPL-MCNC: 5.2 G/DL (ref 6.3–8.2)
PROT UR STRIP-MCNC: 100 MG/DL
PROTHROMBIN TIME: 17.7 SEC (ref 11.7–14.5)
RBC # BLD AUTO: 2.29 M/UL (ref 4.05–5.2)
RBC #/AREA URNS HPF: ABNORMAL /HPF
RBC MORPH BLD: ABNORMAL
SODIUM SERPL-SCNC: 141 MMOL/L (ref 136–145)
SP GR UR REFRACTOMETRY: 1.03 (ref 1–1.02)
STREPTOCOCCUS , STPSP: DETECTED
URATE SERPL-MCNC: 1.7 MG/DL (ref 2.6–6)
UROBILINOGEN UR QL STRIP.AUTO: 0.2 EU/DL (ref 0.2–1)
VAN A/B (VANCOMYCIN RESISTANCE GENE), VRGP: NOT DETECTED
WBC # BLD AUTO: 0 K/UL (ref 4.3–11.1)
WBC MORPH BLD: ABNORMAL
WBC URNS QL MICRO: ABNORMAL /HPF

## 2019-11-19 PROCEDURE — P9040 RBC LEUKOREDUCED IRRADIATED: HCPCS

## 2019-11-19 PROCEDURE — 84100 ASSAY OF PHOSPHORUS: CPT

## 2019-11-19 PROCEDURE — 74011250637 HC RX REV CODE- 250/637: Performed by: NURSE PRACTITIONER

## 2019-11-19 PROCEDURE — 86644 CMV ANTIBODY: CPT

## 2019-11-19 PROCEDURE — 81001 URINALYSIS AUTO W/SCOPE: CPT

## 2019-11-19 PROCEDURE — P9037 PLATE PHERES LEUKOREDU IRRAD: HCPCS

## 2019-11-19 PROCEDURE — 99233 SBSQ HOSP IP/OBS HIGH 50: CPT | Performed by: INTERNAL MEDICINE

## 2019-11-19 PROCEDURE — 65270000015 HC RM PRIVATE ONCOLOGY

## 2019-11-19 PROCEDURE — 83615 LACTATE (LD) (LDH) ENZYME: CPT

## 2019-11-19 PROCEDURE — 80053 COMPREHEN METABOLIC PANEL: CPT

## 2019-11-19 PROCEDURE — 85384 FIBRINOGEN ACTIVITY: CPT

## 2019-11-19 PROCEDURE — 85379 FIBRIN DEGRADATION QUANT: CPT

## 2019-11-19 PROCEDURE — 36430 TRANSFUSION BLD/BLD COMPNT: CPT

## 2019-11-19 PROCEDURE — 65270000029 HC RM PRIVATE

## 2019-11-19 PROCEDURE — 77030020263 HC SOL INJ SOD CL0.9% LFCR 1000ML

## 2019-11-19 PROCEDURE — 74011250636 HC RX REV CODE- 250/636: Performed by: NURSE PRACTITIONER

## 2019-11-19 PROCEDURE — 70450 CT HEAD/BRAIN W/O DYE: CPT

## 2019-11-19 PROCEDURE — 83735 ASSAY OF MAGNESIUM: CPT

## 2019-11-19 PROCEDURE — 85730 THROMBOPLASTIN TIME PARTIAL: CPT

## 2019-11-19 PROCEDURE — 84550 ASSAY OF BLOOD/URIC ACID: CPT

## 2019-11-19 PROCEDURE — 85610 PROTHROMBIN TIME: CPT

## 2019-11-19 PROCEDURE — 87086 URINE CULTURE/COLONY COUNT: CPT

## 2019-11-19 PROCEDURE — 74011250637 HC RX REV CODE- 250/637: Performed by: INTERNAL MEDICINE

## 2019-11-19 PROCEDURE — 36591 DRAW BLOOD OFF VENOUS DEVICE: CPT

## 2019-11-19 PROCEDURE — 74011000258 HC RX REV CODE- 258: Performed by: INTERNAL MEDICINE

## 2019-11-19 PROCEDURE — 74011250636 HC RX REV CODE- 250/636: Performed by: INTERNAL MEDICINE

## 2019-11-19 PROCEDURE — 85025 COMPLETE CBC W/AUTO DIFF WBC: CPT

## 2019-11-19 PROCEDURE — 77030020253 HC SOL INJ D545NS .05 DEX .45 SAL

## 2019-11-19 RX ORDER — SODIUM,POTASSIUM PHOSPHATES 280-250MG
1 POWDER IN PACKET (EA) ORAL
Status: DISPENSED | OUTPATIENT
Start: 2019-11-19 | End: 2019-11-22

## 2019-11-19 RX ORDER — SODIUM CHLORIDE 9 MG/ML
250 INJECTION, SOLUTION INTRAVENOUS AS NEEDED
Status: DISCONTINUED | OUTPATIENT
Start: 2019-11-19 | End: 2019-11-24

## 2019-11-19 RX ADMIN — VORICONAZOLE 200 MG: 200 TABLET, FILM COATED ORAL at 21:25

## 2019-11-19 RX ADMIN — DIPHENHYDRAMINE HYDROCHLORIDE 25 MG: 25 CAPSULE ORAL at 01:15

## 2019-11-19 RX ADMIN — CEFEPIME 2 G: 2 INJECTION, POWDER, FOR SOLUTION INTRAVENOUS at 03:18

## 2019-11-19 RX ADMIN — DULOXETINE 30 MG: 30 CAPSULE, DELAYED RELEASE ORAL at 11:36

## 2019-11-19 RX ADMIN — ACETAMINOPHEN 650 MG: 325 TABLET, FILM COATED ORAL at 06:02

## 2019-11-19 RX ADMIN — DIPHENHYDRAMINE HYDROCHLORIDE 25 MG: 25 CAPSULE ORAL at 11:35

## 2019-11-19 RX ADMIN — Medication 2 TABLET: at 11:35

## 2019-11-19 RX ADMIN — LORAZEPAM 1 MG: 1 TABLET ORAL at 21:25

## 2019-11-19 RX ADMIN — PRAVASTATIN SODIUM 10 MG: 20 TABLET ORAL at 21:25

## 2019-11-19 RX ADMIN — VANCOMYCIN HYDROCHLORIDE 1000 MG: 1 INJECTION, POWDER, LYOPHILIZED, FOR SOLUTION INTRAVENOUS at 11:35

## 2019-11-19 RX ADMIN — LORATADINE 10 MG: 10 TABLET ORAL at 11:36

## 2019-11-19 RX ADMIN — VANCOMYCIN HYDROCHLORIDE 1000 MG: 1 INJECTION, POWDER, LYOPHILIZED, FOR SOLUTION INTRAVENOUS at 22:25

## 2019-11-19 RX ADMIN — ACETAMINOPHEN 650 MG: 325 TABLET, FILM COATED ORAL at 19:09

## 2019-11-19 RX ADMIN — ACETAMINOPHEN 650 MG: 325 TABLET, FILM COATED ORAL at 01:15

## 2019-11-19 RX ADMIN — ACETAMINOPHEN 650 MG: 325 TABLET, FILM COATED ORAL at 11:35

## 2019-11-19 RX ADMIN — ALLOPURINOL 300 MG: 300 TABLET ORAL at 11:36

## 2019-11-19 RX ADMIN — LOPERAMIDE HYDROCHLORIDE 2 MG: 2 CAPSULE ORAL at 21:25

## 2019-11-19 RX ADMIN — POTASSIUM & SODIUM PHOSPHATES POWDER PACK 280-160-250 MG 1 PACKET: 280-160-250 PACK at 17:54

## 2019-11-19 RX ADMIN — ACYCLOVIR 400 MG: 800 TABLET ORAL at 11:36

## 2019-11-19 RX ADMIN — CEFEPIME 2 G: 2 INJECTION, POWDER, FOR SOLUTION INTRAVENOUS at 21:25

## 2019-11-19 RX ADMIN — SODIUM CHLORIDE 1000 ML: 900 INJECTION, SOLUTION INTRAVENOUS at 22:25

## 2019-11-19 RX ADMIN — VORICONAZOLE 200 MG: 200 TABLET, FILM COATED ORAL at 11:36

## 2019-11-19 RX ADMIN — POTASSIUM & SODIUM PHOSPHATES POWDER PACK 280-160-250 MG 1 PACKET: 280-160-250 PACK at 11:36

## 2019-11-19 RX ADMIN — TBO-FILGRASTIM 480 MCG: 480 INJECTION, SOLUTION SUBCUTANEOUS at 21:25

## 2019-11-19 RX ADMIN — ACYCLOVIR 400 MG: 800 TABLET ORAL at 17:53

## 2019-11-19 RX ADMIN — CEFEPIME 2 G: 2 INJECTION, POWDER, FOR SOLUTION INTRAVENOUS at 13:39

## 2019-11-19 NOTE — PROGRESS NOTES
Post Fall Documentation Le Jenkins witnessed/ fall occurred on 11/18/19 at [facility] The answers to the following questions summarize the fall: In the patient's own words: 
· What were you attempting to do when you fell? Getting OOB to go to the BR · Do you know why you fell? I lost my balance · Do you have any pain/discomfort or any other complaints? none · Which part of your body made contact with the floor or other object? Hit head on the door,buttocks on the floor Nurse: 
? Was this an assisted fall? no 
? Was fall witnessed? Yes     By Whom? Jojo Carvajal,PCT 
? If witnessed, what part of the body made contact with the floor or other object? Head hit the door;buttocks on the floor ? Patients mental status after the fall/when found: Alert and oriented ? Any apparent injury:  No apparent injury ? Immediate interventions for injury/suspected injury? No interventions needed ? Patient assisted back to bed? Assist X2 
? Name of provider notified and time, any comments? Kaela Rhoades at   
? Orders given based on protocol ? Name of family member notified and time: none;pt does not want  be called at this time ;pt stated \"just tell him when he comes in the morning\" Document Immediate VS and physical assessment in flowsheets. Document Neuro assessment every hour x 4 (for potential head injury or unwitnessed fall) in flowsheets.  
 
 
 
Daron Aguirre RN

## 2019-11-19 NOTE — PROGRESS NOTES
END OF SHIFT NOTE: 
 
Intake/Output 11/18 1901 - 11/19 0700 In: 2649.3 [P.O.:420; I.V.:1991] Out: 800 [Urine:800] Voiding: YES Catheter: NO 
Drain:   
 
 
 
 
 
Stool:  0 occurrences. Stool Assessment Stool Color: Brown (11/18/19 1452) Stool Appearance: Formed(per pt) (11/18/19 1452) Stool Amount: Small (11/18/19 1452) Stool Source/Status: Rectum (11/18/19 1452) Emesis:  0 occurrences. VITAL SIGNS Patient Vitals for the past 12 hrs: 
 Temp Pulse Resp BP SpO2  
11/19/19 0415 99.1 °F (37.3 °C) 90 18 113/47 93 % 11/19/19 0210 99.9 °F (37.7 °C) 86 16 98/44 93 % 11/19/19 0145 99.7 °F (37.6 °C) 89 18 142/57 92 % 11/19/19 0130 99.9 °F (37.7 °C) 90 16 138/47 92 % 11/19/19 0057 99.3 °F (37.4 °C) 82 18 144/56 93 % 11/18/19 2315 99.6 °F (37.6 °C) 91 18 135/56 97 % 11/18/19 2305 99.5 °F (37.5 °C) 99 18 130/56 94 % 11/18/19 2100 (!) 100.6 °F (38.1 °C)      
11/18/19 1939 (!) 102.1 °F (38.9 °C) (!) 107 20 136/58 92 % Pain Assessment Pain 1 Pain Scale 1: Numeric (0 - 10) (11/19/19 0130) Pain Intensity 1: 0 (11/19/19 0130) Patient Stated Pain Goal: 0 (11/19/19 0130) Pain Reassessment 1: Patient resting w/respiratory rate greater than 10 (11/13/19 0250) Pain Onset 1: suddenly (11/10/19 1000) Pain Location 1: Leg (11/11/19 0505) Pain Orientation 1: Left;Right (11/11/19 0505) Pain Description 1: Aching (11/11/19 0505) Pain Intervention(s) 1: Medication (see MAR) (11/11/19 0505) Ambulating Yes;instructed needs to be assisted Additional Information: No neuro/mental status changes after pt fell. Fall precautions in place;BSC provided & been assisted prn. 
1 unit platelets given;well tolerated. No bleeding. TMax 102. 1. No BM overnight. Hardly any nausea overnight per pt. Needs 1 unit PRBC for hgb 6.9 per protocol & 1 unit platelets per standing order (plt ct post transfusion 40;order to transfuse until plt ct >50). Shift report given to oncoming nurse Radha Coleman  at the bedside.  
 
Eleanor Duran RN

## 2019-11-19 NOTE — PROGRESS NOTES
Positive blood culture called from lab by Valdez Oliveros. Results charted and called to ARMANDO QUESADA. No new orders obtained at this time. Pt already receiving antibiotic coverage at this time.

## 2019-11-19 NOTE — PROGRESS NOTES
Problem: Falls - Risk of 
Goal: *Absence of Falls Description Document Juaquin Iverson Fall Risk and appropriate interventions in the flowsheet. Outcome: Progressing Towards Goal 
Note:  
Fall Risk Interventions: 
Mobility Interventions: Communicate number of staff needed for ambulation/transfer, Patient to call before getting OOB, Strengthening exercises (ROM-active/passive) Medication Interventions: Patient to call before getting OOB, Teach patient to arise slowly Elimination Interventions: Call light in reach, Elevated toilet seat, Patient to call for help with toileting needs, Stay With Me (per policy), Toilet paper/wipes in reach, Toileting schedule/hourly rounds History of Falls Interventions: Room close to nurse's station, Investigate reason for fall Problem: Patient Education: Go to Patient Education Activity Goal: Patient/Family Education Outcome: Progressing Towards Goal

## 2019-11-19 NOTE — PROGRESS NOTES
Informed NP Ping Morejon about pt's fall. Read to her the protocol for falls on patients w/ low platelets. Stated to continue to monitor pt. Informed pt hit her head on the door prior to falling on the floor. No injuries noted;pt no pain;no new neuro changes. Coag labs were ordered for AM instead of STAT as per NP. CT brain only if new neuro/mental status changes. Transfuse platelets 1 unit & rpt plt ct after 30 mins;keep plt ct >50.

## 2019-11-19 NOTE — PROGRESS NOTES
END OF SHIFT NOTE: 
 
Intake/Output No intake/output data recorded. Voiding: YES Catheter: NO 
Drain:   
 
 
 
 
 
Stool:  4 occurrences. Stool Assessment Stool Color: Brown (11/18/19 1452) Stool Appearance: Formed(per pt) (11/18/19 1452) Stool Amount: Small (11/18/19 1452) Stool Source/Status: Rectum (11/18/19 1452) Emesis:  1 occurrences. VITAL SIGNS Patient Vitals for the past 12 hrs: 
 Temp Pulse Resp BP SpO2  
11/18/19 1631 98.4 °F (36.9 °C)      
11/18/19 1453 99.9 °F (37.7 °C) 98 20 128/63 94 % 11/18/19 1133 98.1 °F (36.7 °C) 86 19 148/63 99 % 11/18/19 0803 98.4 °F (36.9 °C) 82 17 134/61 96 % Pain Assessment Pain 1 Pain Scale 1: Numeric (0 - 10) (11/18/19 1445) Pain Intensity 1: 0 (11/18/19 1445) Patient Stated Pain Goal: 0 (11/18/19 1445) Pain Reassessment 1: Patient resting w/respiratory rate greater than 10 (11/13/19 0250) Pain Onset 1: suddenly (11/10/19 1000) Pain Location 1: Leg (11/11/19 0505) Pain Orientation 1: Left;Right (11/11/19 0505) Pain Description 1: Aching (11/11/19 0505) Pain Intervention(s) 1: Medication (see MAR) (11/11/19 0505) Ambulating Yes Additional Information:  
-Nausea increased today 
-did not get OOB much today, poor appetite Shift report given to oncoming nurse at the bedside.  
 
Yenny Moreno, RN

## 2019-11-19 NOTE — PROGRESS NOTES
First febrile neutropenic episode temp 102.1 Notified NP [place];protocol followed as ordered. Blood cx x2,UA,urine cx & chest xray done. IV Maxipime & Vanco per pharmacy started.

## 2019-11-19 NOTE — PROGRESS NOTES
[employer] [facility] Inpatient Hematology / Oncology Daily Progress Note Reason for Admission:  Admission for antineoplastic chemotherapy [Z51.11] 24 Hour Events: 
Tmax 102.1, VSS 
UA +UTI, UCx pending BCx-NGTD Day 12 FLAG-VENITA Awaiting count recovery, on Granix S/p fall last PM, CT head neg ROS: 
Constitutional: +fatigue +fever CV: Negative for chest pain, palpitations, edema. Respiratory: Negative for dyspnea, cough, wheezing. GI: Negative for nausea, abdominal pain. 10 point review of systems is otherwise negative with the exception of the elements mentioned above in the HPI. No Known Allergies Past Medical History:  
Diagnosis Date  AML (acute myeloid leukemia) ([facility] [address] dx- 4/2019  
 followed by dr Lakisha Francois  Depression  Hypercholesterolemia  Infection   
 of port -- was placed 5/2019-- removed 6/2019---right chest  
 Psychiatric disorder   
 aniexty  Sleep apnea Past Surgical History:  
Procedure Laterality Date  HX OTHER SURGICAL    
 colonoscopy  HX VASCULAR ACCESS    
 IR INSERT TUNL CVC W PORT OVER 5 YEARS  4/30/2019  IR INSERT TUNL CVC W PORT OVER 5 YEARS  7/15/2019  IR REMOVE TUNL CVAD W PORT/PUMP  6/13/2019 Family History Problem Relation Age of Onset  Cancer Father Social History Socioeconomic History  Marital status:  Spouse name: Not on file  Number of children: Not on file  Years of education: Not on file  Highest education level: Not on file Occupational History  Not on file Social Needs  Financial resource strain: Not on file  Food insecurity:  
  Worry: Not on file Inability: Not on file  Transportation needs:  
  Medical: Not on file Non-medical: Not on file Tobacco Use  Smoking status: Never Smoker  Smokeless tobacco: Never Used Substance and Sexual Activity  Alcohol use: Never Frequency: Never  Drug use: Never  Sexual activity: Not on file Lifestyle  Physical activity:  
  Days per week: Not on file Minutes per session: Not on file  Stress: Not on file Relationships  Social connections:  
  Talks on phone: Not on file Gets together: Not on file Attends Gnosticist service: Not on file Active member of club or organization: Not on file Attends meetings of clubs or organizations: Not on file Relationship status: Not on file  Intimate partner violence:  
  Fear of current or ex partner: Not on file Emotionally abused: Not on file Physically abused: Not on file Forced sexual activity: Not on file Other Topics Concern [address] Service Not Asked  Blood Transfusions Not Asked  Caffeine Concern Not Asked  Occupational Exposure Not Asked Sherry [place] Hazards Not Asked  Sleep Concern Not Asked  Stress Concern Not Asked  Weight Concern Not Asked  Special Diet Not Asked  Back Care Not Asked  Exercise Not Asked  Bike Helmet Not Asked  Seat Belt Not Asked  Self-Exams Not Asked Social History Narrative  Not on file Current Facility-Administered Medications Medication Dose Route Frequency Provider Last Rate Last Dose  
 0.9% sodium chloride infusion 250 mL  250 mL IntraVENous PRN Miky Torres MD      
 potassium, sodium phosphates (NEUTRA-PHOS) packet 1 Packet  1 Packet Oral TID WITH MEALS Cristiana Regalado NP   1 Packet at 11/19/19 1136  
 vancomycin (VANCOCIN) 1,000 mg in 0.9% sodium chloride (MBP/ADV) 250 mL  1,000 mg IntraVENous Q12H Miky Torres  mL/hr at 11/19/19 1135 1,000 mg at 11/19/19 1135  [START ON 11/20/2019] VANCOMYCIN TROUGH REMINDER   Other ONCE Miky Torres MD      
 [facility]) oral suspension 30 mL  30 mL Oral Q4H PRN Miky Torres MD   30 mL at 11/18/19 0221  cefepime (MAXIPIME) 2 g in 0.9% sodium chloride (MBP/ADV) 100 mL  2 g IntraVENous Q8H Tenzin Yap  mL/hr at 11/19/19 0318 2 g at 11/19/19 0318  
 0.9% sodium chloride infusion 250 mL  250 mL IntraVENous PRN Ruchi Manning NP      
 loperamide (IMODIUM) capsule 2 mg  2 mg Oral Q4H PRN Tenzin Yap MD   2 mg at 11/18/19 2110  loratadine (CLARITIN) tablet 10 mg  10 mg Oral DAILY Claudiosofy Barkley NP   10 mg at 11/19/19 1136  sodium chloride 0.9 % bolus infusion 1,000 mL  1,000 mL IntraVENous QHS Claudio Barkley NP   Stopped at 11/18/19 2215  central line flush (saline) syringe 10 mL  10 mL InterCATHeter PRN Tenzin Yap MD   10 mL at 11/18/19 2110  
 docusate sodium (COLACE) capsule 100 mg  100 mg Oral BID PRN Ruchi Manning NP   100 mg at 11/11/19 1308  LORazepam (ATIVAN) injection 0.5 mg  0.5 mg IntraVENous Q6H PRN Tenzin Yap MD   0.5 mg at 11/17/19 0546  
 saline peripheral flush soln 10 mL  10 mL InterCATHeter PRN Tenzin Yap MD   10 mL at 11/13/19 2154  heparin (porcine) pf 300-500 Units  300-500 Units InterCATHeter PRN Tenzin Yap MD      
 tbo-filgrastim [facility]) injection 480 mcg  480 mcg SubCUTAneous QHS Tenzin Yap MD   480 mcg at 11/18/19 2110  
 acyclovir (ZOVIRAX) tablet 400 mg  400 mg Oral BID Ruchi Manning NP   400 mg at 11/19/19 1136  allopurinol (ZYLOPRIM) tablet 300 mg  300 mg Oral DAILY Ruchi Manning NP   300 mg at 11/19/19 1136  cholecalciferol (VITAMIN D3) (400 Units /10 mcg) tablet 2 Tab  800 Units Oral DAILY Ruchi Manning NP   2 Tab at 11/19/19 1135  DULoxetine (CYMBALTA) capsule 30 mg  30 mg Oral DAILY Ruchi Manning NP   30 mg at 11/19/19 1136  lidocaine-prilocaine (EMLA) 2.5-2.5 % cream   Topical PRN Ruchi Manning NP      
 LORazepam (ATIVAN) tablet 1 mg  1 mg Oral QHS Ruchi Manning NP   1 mg at 11/18/19 2110  pravastatin (PRAVACHOL) tablet 10 mg  10 mg Oral QHS Ruchi Manning NP   10 mg at 11/18/19 2110  voriconazole (VFEND) tablet 200 mg  200 mg Oral Q12H Ruchi Manning NP   200 mg at 11/19/19 1136  ondansetron (ZOFRAN) injection 4 mg  4 mg IntraVENous Q4H PRN Alondra Gave, NP   4 mg at 19 1455  prochlorperazine (COMPAZINE) with saline injection 5 mg  5 mg IntraVENous Q6H PRN Alondra Gave, NP   5 mg at 19 1632  
 HYDROcodone-acetaminophen (NORCO) 5-325 mg per tablet 1 Tab  1 Tab Oral Q6H PRN Alondra Gave, NP   1 Tab at 19 7240  morphine injection 2 mg  2 mg IntraVENous Q4H PRN Alondra Gave, NP      
 acetaminophen (TYLENOL) tablet 650 mg  650 mg Oral Q6H PRN Sarah Crow MD   650 mg at 19 1135  diphenhydrAMINE (BENADRYL) capsule 25 mg  25 mg Oral Q6H PRN Sarah Crow MD   25 mg at 19 1135  acetaminophen/diphenhydrAMINE (TYLENOL PM EXT STR) 500/25 mg (Patient Supplied)   Oral QHS Sarah Crow MD      
 vit C,S-Rn-wcnkw-lutein-zeaxan (PRESERVISION AREDS-2) capsule 1 Cap (Patient Supplied)  [address] Sarah Crow MD   1 Cap at 19 1135 OBJECTIVE: 
Patient Vitals for the past 8 hrs: 
 BP Temp Pulse Resp SpO2  
19 1118 126/59 99.2 °F (37.3 °C) 83 16 91 % 19 0915  99.1 °F (37.3 °C)     
19 0728 122/52 (!) 101.3 °F (38.5 °C) 94 18 95 % 19 0722  (!) 101 °F (38.3 °C)     
19 0558  (!) 101 °F (38.3 °C)    Temp (24hrs), Av °F (37.8 °C), Min:98.4 °F (36.9 °C), Max:102.1 °F (38.9 °C) 
 
701 -  190 In: 360 [P.O.:360] Out: 300 [Urine:300] Physical Exam: 
Constitutional: Well developed, well nourished female in no acute distress, lying comfortably in the hospital bed. HEENT: Normocephalic and atraumatic. Oropharynx is clear, mucous membranes are moist. Extraocular muscles are intact. Sclerae anicteric. Neck supple without JVD. No thyromegaly present. Skin Warm and dry. No rash noted. No erythema. No pallor. Bruising noted on BUE/R elbow and abdomen.    
Respiratory Lungs are clear to auscultation bilaterally without wheezes, rales or rhonchi, normal air exchange without accessory muscle use. CVS Normal rate, regular rhythm and normal S1 and S2. No murmurs, gallops, or rubs. Abdomen Soft, mildly tender across lower abd-improving, nondistended, normoactive bowel sounds. No palpable mass. No hepatosplenomegaly. Neuro Grossly nonfocal with no obvious sensory or motor deficits. MSK Normal range of motion in general.  No edema and no tenderness. Psych Appropriate mood and affect. Labs:   
Recent Results (from the past 24 hour(s)) CULTURE, BLOOD Collection Time: 11/18/19  7:55 PM  
Result Value Ref Range Special Requests: LATERAL PORT Culture result: NO GROWTH AFTER 11 HOURS    
CULTURE, BLOOD Collection Time: 11/18/19  8:32 PM  
Result Value Ref Range Special Requests: RIGHT 
HAND Culture result: NO GROWTH AFTER 10 HOURS PLATELETS, ALLOCATE Collection Time: 11/18/19 11:45 PM  
Result Value Ref Range Unit number [id number] Blood component type PLTPH,LRIR,1 Unit division 00 Status of unit ISSUED URINALYSIS W/ RFLX MICROSCOPIC Collection Time: 11/19/19  1:02 AM  
Result Value Ref Range Color TRU Appearance CLOUDY Specific gravity 1.026 (H) 1.001 - 1.023    
 pH (UA) 6.5 5.0 - 9.0 Protein 100 (A) NEG mg/dL Glucose NEGATIVE  mg/dL Ketone NEGATIVE  NEG mg/dL Bilirubin NEGATIVE  NEG Blood SMALL (A) NEG Urobilinogen 0.2 0.2 - 1.0 EU/dL Nitrites NEGATIVE  NEG Leukocyte Esterase NEGATIVE  NEG    
 WBC 5-10 0 /hpf  
 RBC 3-5 0 /hpf Epithelial cells 10-20 0 /hpf Bacteria TRACE 0 /hpf Casts 3-5 0 /lpf CULTURE, URINE Collection Time: 11/19/19  1:02 AM  
Result Value Ref Range Special Requests: NO SPECIAL REQUESTS Culture result:     
  NO GROWTH AFTER SHORT PERIOD OF INCUBATION. FURTHER RESULTS TO FOLLOW AFTER OVERNIGHT INCUBATION. METABOLIC PANEL, COMPREHENSIVE  Collection Time: 11/19/19  2:53 AM  
 Result Value Ref Range Sodium 141 136 - 145 mmol/L Potassium 3.8 3.5 - 5.1 mmol/L Chloride 109 (H) 98 - 107 mmol/L  
 CO2 24 21 - 32 mmol/L Anion gap 8 7 - 16 mmol/L Glucose 95 65 - 100 mg/dL BUN 12 8 - 23 MG/DL Creatinine 0.59 (L) 0.6 - 1.0 MG/DL  
 GFR est AA >60 >60 ml/min/1.73m2 GFR est non-AA >60 >60 ml/min/1.73m2 Calcium 7.0 (L) 8.3 - 10.4 MG/DL Bilirubin, total 0.4 0.2 - 1.1 MG/DL  
 ALT (SGPT) 24 12 - 65 U/L  
 AST (SGOT) 11 (L) 15 - 37 U/L Alk. phosphatase 57 50 - 136 U/L Protein, total 5.2 (L) 6.3 - 8.2 g/dL Albumin 1.8 (L) 3.2 - 4.6 g/dL Globulin 3.4 2.3 - 3.5 g/dL A-G Ratio 0.5 (L) 1.2 - 3.5 MAGNESIUM Collection Time: 11/19/19  2:53 AM  
Result Value Ref Range Magnesium 1.7 (L) 1.8 - 2.4 mg/dL LD Collection Time: 11/19/19  2:53 AM  
Result Value Ref Range  (H) 110 - 210 U/L  
URIC ACID Collection Time: 11/19/19  2:53 AM  
Result Value Ref Range Uric acid 1.7 (L) 2.6 - 6.0 MG/DL  
PHOSPHORUS Collection Time: 11/19/19  2:53 AM  
Result Value Ref Range Phosphorus 2.2 (L) 2.3 - 3.7 MG/DL  
CBC WITH AUTOMATED DIFF Collection Time: 11/19/19  2:53 AM  
Result Value Ref Range WBC 0.0 (LL) 4.3 - 11.1 K/uL  
 RBC 2.29 (L) 4.05 - 5.2 M/uL HGB 6.9 (LL) 11.7 - 15.4 g/dL HCT 20.2 (LL) 35.8 - 46.3 % MCV 88.2 79.6 - 97.8 FL  
 MCH 30.1 26.1 - 32.9 PG  
 MCHC 34.2 31.4 - 35.0 g/dL  
 RDW 13.3 11.9 - 14.6 % PLATELET 40 (L) 493 - 450 K/uL MPV 10.3 9.4 - 12.3 FL ABSOLUTE NRBC 0.00 0.0 - 0.2 K/uL  
 RBC COMMENTS NORMOCYTIC/NORMOCHROMIC    
 WBC COMMENTS Result Confirmed By Smear PLATELET COMMENTS MARKED    
 DF MANUAL    
PTT Collection Time: 11/19/19  2:53 AM  
Result Value Ref Range aPTT 37.8 24.7 - 39.8 SEC FIBRINOGEN Collection Time: 11/19/19  2:53 AM  
Result Value Ref Range Fibrinogen 583 (H) 190 - 501 mg/dL PROTHROMBIN TIME + INR Collection Time: 11/19/19  2:53 AM  
Result Value Ref Range Prothrombin time 17.7 (H) 11.7 - 14.5 sec INR 1.4    
D DIMER Collection Time: 11/19/19  2:53 AM  
Result Value Ref Range D DIMER 3.82 (HH) <0.56 ug/ml(FEU) PLATELETS, ALLOCATE Collection Time: 11/19/19  4:45 AM  
Result Value Ref Range Unit number [id number] Blood component type PLTPH,LRIR,1 Unit division 00 Status of unit ALLOCATED Imaging: 
CT HEAD WO CONT [[id number]] Collected: 11/19/19 1050 Order Status: Completed Updated: 11/19/19 1054 Narrative:    
CT HEAD WITHOUT CONTRAST, 11/19/2019 History: Fall last night hitting left side of head Comparison: . Technique:   5 mm axial scans from the skull base to the vertex. All CT scans 
performed at this facility use one or all of the following: Automated exposure 
control, adjustment of the mA and/or kVp according to patient's size, iterative 
reconstruction. Findings:  No evidence of intracranial hemorrhage is seen. Faint bilateral basal 
ganglia calcifications are seen. No abnormal extra-axial fluid collections are 
seen.  Mild cortical involutional changes are seen which are not felt to be 
abnormal given the patient's age. Marea Chavez evidence for acute hydrocephalus is seen. No evidence of midline shift or herniation is seen.  No abnormal edema pattern 
is seen in a vascular distribution to suggest large artery infarction. Evaluation with bone windows shows no acute osseous changes.  The visualized 
sinuses, middle ears, and mastoid air cells are well aerated. Impression:    
IMPRESSION:   
1.  No acute intracranial process evident by noncontrast CT study of the head. XR CHEST SNGL V [[id number]] Collected: 11/18/19 2041 Order Status: Completed Updated: 11/18/19 2044 Narrative:    
EXAM: XR CHEST SNGL V 
 
INDICATION: neutropenic fever COMPARISON: 9/29/2019 FINDINGS: A portable AP radiograph of the chest was obtained at 1946 hours.  The 
 patient is on a cardiac monitor. The lungs are clear. The cardiac and 
mediastinal contours and pulmonary vascularity are normal.  The bones and soft 
tissues are grossly within normal limits. Impression:    
IMPRESSION: Normal chest.  
XR ELBOW RT MIN 3 V [[id number]] Collected: 11/10/19 1421 Order Status: Completed Updated: 11/10/19 1424 Narrative:    
Right elbow Clinical location: Elbow pain after feeling a pop, decreased range of motion FINDINGS: Four views of the right elbow show no fracture or dislocation. There 
is no joint effusion. The soft tissues are unremarkable. Impression:    
IMPRESSION: No acute osseous abnormality or joint derangement of the right 
elbow. ASSESSMENT: 
Problem List  Date Reviewed: 10/31/2019 Codes Class Noted Febrile neutropenia (HCC) ICD-10-CM: D70.9, R50.81 ICD-9-CM: 288.00, 780.61  9/21/2019 Pancytopenia due to antineoplastic chemotherapy [facility]) ICD-10-CM: D61.810, T45.1X5A 
ICD-9-CM: 284.11, E933.1  6/12/2019 Cellulitis of neck ICD-10-CM: X26.717 ICD-9-CM: 682.1  6/12/2019 Immunocompromised status associated with infection ICD-10-CM: B99.9 ICD-9-CM: 136.9  6/12/2019 Port or reservoir infection ICD-10-CM: R70.395Y ICD-9-CM: 999.33  6/12/2019 [facility] achieved [facility] ICD-10-CM: C92.00 ICD-9-CM: 205.00  5/9/2019 Admission for antineoplastic chemotherapy ICD-10-CM: Z51.11 ICD-9-CM: V58.11  5/5/2019 AML (acute myeloblastic leukemia) ([facility] [address] ICD-10-CM: C92.00 ICD-9-CM: 205.00  4/28/2019 Weakness generalized ICD-10-CM: R53.1 ICD-9-CM: 780.79  4/28/2019 Pancytopenia ([facility] [address] ICD-10-CM: Z25.998 ICD-9-CM: 284.19  4/28/2019 Thrombocytopenia ([address] ICD-10-CM: D69.6 ICD-9-CM: 287.5  4/27/2019 Ms. Luna Doshi is a 68 y.o. female admitted on 11/7/2019. She is a known patient of Dr. 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 She failed induction with 7+3/Midostaurin. On Dacogen/Gilteritinib with recent BMbx with persistent residual disease. She is being admitted for FLAG-VENITA. She is feeling well and is ready to proceed with treatment. PLAN: 
AML 
- admit for salvage FLAG-VENITA 
- needs Echo prior - ordered 11/8 Day 1 FLAG-VENITA. Echo with EF 55-60%, proceed with tx. 
11/9 Day 2 FLAG-VENITA. Tolerating well, no issues. Uric acid 3.1 today. 11/10 Day 3 FLAG-VENITA. No issues with treatment. Uric acid 3.3 today. 11/12 Day 5 FLAG-VENITA. Tolerating treatment well. G-CSF to start tomorrow. 11/13 Day 6 FLAG-VENITA, doing well, Granix/claritin starts today 11/19 Day 12 FLAG-VENITA. Awaiting count recovery, con't Granix. Pancytopenia secondary to disease - Transfuse prn per [facility] SOPs 
11/19 Transfuse PRBCs today R elbow pain 11/11 c/o R elbow pain with limited ROM yesterday. Xray neg. Pain improved today, noted bruising. Normal ROM on exam. 
 
Mild Abd discomfort/loose stools 11/13 discomfort is improved from yesterday, will monitor 11/14 loose stools, but not watery, can use Imodium prn 
11/15 Imodium working well  
11/16 controlled Neutropenic fever / UTI 
11/19 Tmax 102. 1.  UA +UTI. UCx pending. BCx-NGTD. CXR neg. On Cef/Vanc. DC prophylactic LVQ. Fall 
11/19 s/p last PM.  No LOC. Hit head. CT head neg. Continue home meds Prophylactic Antibx: Acyclovir, Voriconazole [facility] SOPs SCDs for DVT prophylaxis (AC contraindicated d/t thrombocytopenia) Goals and plan of care reviewed with the patient. All questions answered to the best of our ability. Disposition:  Awaiting count recovery. Will need BMbx prior to discharge. Upon discharge will need twice weekly labs w transfusions as needed. Weekly provider visits. RTC within 1 week from discharge. Belia Arndt NP [facility] Hematology & Oncology [id number] [facility] [place] Office : [phone] Fax : [phone] Attending Addendum: 
I have personally performed a face to face diagnostic evaluation on this patient. I have reviewed and agree with the care plan as documented by Rohan Anguiano N.P. 36 minutes were spent on patient care, including but not limited to, reviewing the chart and time with the patient and family, more than 50% of the time documented was spent in face-to-face contact with the patient and in the care of the patient on the floor/unit where the patient is located. My findings are as follows: She has AML, appears weak, heart rate regular without murmurs, abdomen is non-tender, bowel sounds are positive, we will continue IV ABX and Day 12 of FLAG-VENITA, we will also correct her electrolytes. Naveen Millan MD 
 
 
[facility] Hematology/Oncology [address] [place] [address] Office : [phone] Fax : [phone]

## 2019-11-19 NOTE — PROGRESS NOTES
Pharmacokinetic Consult to Pharmacist 
 
Mariuszkatyagutierrez Griggs is a 68 y.o. female being treated for febrile neutropenia with vancomycin. Height: 5' 6\" (167.6 cm)  Weight: 85.9 kg (189 lb 4.8 oz) Lab Results Component Value Date/Time BUN 14 11/18/2019 02:11 AM  
 Creatinine 0.55 (L) 11/18/2019 02:11 AM  
 WBC 0.0 (LL) 11/18/2019 02:11 AM  
 Procalcitonin 0.1 09/21/2019 06:50 PM  
 Lactic Acid (POC) 0.67 09/21/2019 08:51 PM  
  
Estimated Creatinine Clearance: 79 mL/min (A) (by C-G formula based on SCr of 0.55 mg/dL (L)). Day 1 of vancomycin. Goal trough is 15-20. Vancomycin dose initiated at 2000 mg x 1 dose; followed by Vanc 1250 mg IV q18h. Will continue to follow patient. Thank you, John Kelley, PharmD.

## 2019-11-19 NOTE — PROGRESS NOTES
Notified Katelynn Corey NP re: CT of the brain 6 hours after fall to rule out bleed as indicated in the protocol. Stated can be ordered but do it as routine. INR 1.4, no need to transfuse FFP per NP as pt w/ no bleeding. No new neuro/mental status changes;order to change neuro checks to Q 4.

## 2019-11-20 ENCOUNTER — APPOINTMENT (OUTPATIENT)
Dept: GENERAL RADIOLOGY | Age: 74
DRG: 837 | End: 2019-11-20
Attending: NURSE PRACTITIONER
Payer: MEDICARE

## 2019-11-20 LAB
ABO + RH BLD: NORMAL
ALBUMIN SERPL-MCNC: 1.7 G/DL (ref 3.2–4.6)
ALBUMIN/GLOB SERPL: 0.5 {RATIO} (ref 1.2–3.5)
ALP SERPL-CCNC: 64 U/L (ref 50–136)
ALT SERPL-CCNC: 20 U/L (ref 12–65)
ANION GAP SERPL CALC-SCNC: 6 MMOL/L (ref 7–16)
AST SERPL-CCNC: 8 U/L (ref 15–37)
BASOPHILS # BLD: 0 K/UL (ref 0–0.2)
BASOPHILS NFR BLD: 0 % (ref 0–2)
BILIRUB SERPL-MCNC: 0.3 MG/DL (ref 0.2–1.1)
BLD PROD TYP BPU: NORMAL
BLOOD GROUP ANTIBODIES SERPL: NORMAL
BPU ID: NORMAL
BUN SERPL-MCNC: 10 MG/DL (ref 8–23)
CALCIUM SERPL-MCNC: 7.1 MG/DL (ref 8.3–10.4)
CHLORIDE SERPL-SCNC: 113 MMOL/L (ref 98–107)
CO2 SERPL-SCNC: 25 MMOL/L (ref 21–32)
CREAT SERPL-MCNC: 0.6 MG/DL (ref 0.6–1)
CROSSMATCH RESULT,%XM: NORMAL
DIFFERENTIAL METHOD BLD: ABNORMAL
EOSINOPHIL # BLD: 0 K/UL (ref 0–0.8)
EOSINOPHIL NFR BLD: 0 % (ref 0.5–7.8)
ERYTHROCYTE [DISTWIDTH] IN BLOOD BY AUTOMATED COUNT: 13.6 % (ref 11.9–14.6)
GLOBULIN SER CALC-MCNC: 3.7 G/DL (ref 2.3–3.5)
GLUCOSE SERPL-MCNC: 86 MG/DL (ref 65–100)
HCT VFR BLD AUTO: 22.2 % (ref 35.8–46.3)
HGB BLD-MCNC: 7.5 G/DL (ref 11.7–15.4)
IMM GRANULOCYTES # BLD AUTO: 0 K/UL (ref 0–0.5)
IMM GRANULOCYTES NFR BLD AUTO: 0 % (ref 0–5)
LDH SERPL L TO P-CCNC: 267 U/L (ref 110–210)
LYMPHOCYTES # BLD: 0 K/UL (ref 0.5–4.6)
LYMPHOCYTES NFR BLD: 0 % (ref 13–44)
MAGNESIUM SERPL-MCNC: 1.7 MG/DL (ref 1.8–2.4)
MCH RBC QN AUTO: 29.5 PG (ref 26.1–32.9)
MCHC RBC AUTO-ENTMCNC: 33.8 G/DL (ref 31.4–35)
MCV RBC AUTO: 87.4 FL (ref 79.6–97.8)
MONOCYTES # BLD: 0 K/UL (ref 0.1–1.3)
MONOCYTES NFR BLD: 0 % (ref 4–12)
NEUTS SEG # BLD: 0 K/UL (ref 1.7–8.2)
NEUTS SEG NFR BLD: 100 % (ref 43–78)
NRBC # BLD: 0 K/UL (ref 0–0.2)
PHOSPHATE SERPL-MCNC: 2.5 MG/DL (ref 2.3–3.7)
PLATELET # BLD AUTO: 61 K/UL (ref 150–450)
PMV BLD AUTO: 10.1 FL (ref 9.4–12.3)
POTASSIUM SERPL-SCNC: 3 MMOL/L (ref 3.5–5.1)
PROT SERPL-MCNC: 5.4 G/DL (ref 6.3–8.2)
RBC # BLD AUTO: 2.54 M/UL (ref 4.05–5.2)
SODIUM SERPL-SCNC: 144 MMOL/L (ref 136–145)
SPECIMEN EXP DATE BLD: NORMAL
STATUS OF UNIT,%ST: NORMAL
UNIT DIVISION, %UDIV: 0
URATE SERPL-MCNC: 1.7 MG/DL (ref 2.6–6)
VANCOMYCIN TROUGH SERPL-MCNC: 12.6 UG/ML (ref 5–20)
WBC # BLD AUTO: 0 K/UL (ref 4.3–11.1)

## 2019-11-20 PROCEDURE — 80202 ASSAY OF VANCOMYCIN: CPT

## 2019-11-20 PROCEDURE — 83735 ASSAY OF MAGNESIUM: CPT

## 2019-11-20 PROCEDURE — 74011250636 HC RX REV CODE- 250/636: Performed by: INTERNAL MEDICINE

## 2019-11-20 PROCEDURE — 65270000029 HC RM PRIVATE

## 2019-11-20 PROCEDURE — 84100 ASSAY OF PHOSPHORUS: CPT

## 2019-11-20 PROCEDURE — 65270000015 HC RM PRIVATE ONCOLOGY

## 2019-11-20 PROCEDURE — 74011000258 HC RX REV CODE- 258: Performed by: INTERNAL MEDICINE

## 2019-11-20 PROCEDURE — 36591 DRAW BLOOD OFF VENOUS DEVICE: CPT

## 2019-11-20 PROCEDURE — 80053 COMPREHEN METABOLIC PANEL: CPT

## 2019-11-20 PROCEDURE — 74011250637 HC RX REV CODE- 250/637: Performed by: NURSE PRACTITIONER

## 2019-11-20 PROCEDURE — 87040 BLOOD CULTURE FOR BACTERIA: CPT

## 2019-11-20 PROCEDURE — 83615 LACTATE (LD) (LDH) ENZYME: CPT

## 2019-11-20 PROCEDURE — 85025 COMPLETE CBC W/AUTO DIFF WBC: CPT

## 2019-11-20 PROCEDURE — 99233 SBSQ HOSP IP/OBS HIGH 50: CPT | Performed by: INTERNAL MEDICINE

## 2019-11-20 PROCEDURE — 74011250637 HC RX REV CODE- 250/637: Performed by: INTERNAL MEDICINE

## 2019-11-20 PROCEDURE — 84550 ASSAY OF BLOOD/URIC ACID: CPT

## 2019-11-20 PROCEDURE — 71045 X-RAY EXAM CHEST 1 VIEW: CPT

## 2019-11-20 RX ORDER — POTASSIUM CHLORIDE 20 MEQ/1
20 TABLET, EXTENDED RELEASE ORAL 2 TIMES DAILY
Status: DISCONTINUED | OUTPATIENT
Start: 2019-11-20 | End: 2019-12-12 | Stop reason: HOSPADM

## 2019-11-20 RX ORDER — VANCOMYCIN HYDROCHLORIDE
1250 EVERY 12 HOURS
Status: DISCONTINUED | OUTPATIENT
Start: 2019-11-20 | End: 2019-11-22 | Stop reason: DRUGHIGH

## 2019-11-20 RX ORDER — ALBUTEROL SULFATE 0.83 MG/ML
2.5 SOLUTION RESPIRATORY (INHALATION)
Status: DISCONTINUED | OUTPATIENT
Start: 2019-11-20 | End: 2019-12-12 | Stop reason: HOSPADM

## 2019-11-20 RX ADMIN — POTASSIUM & SODIUM PHOSPHATES POWDER PACK 280-160-250 MG 1 PACKET: 280-160-250 PACK at 17:22

## 2019-11-20 RX ADMIN — VORICONAZOLE 200 MG: 200 TABLET, FILM COATED ORAL at 21:45

## 2019-11-20 RX ADMIN — PRAVASTATIN SODIUM 10 MG: 20 TABLET ORAL at 21:46

## 2019-11-20 RX ADMIN — LORAZEPAM 1 MG: 1 TABLET ORAL at 21:46

## 2019-11-20 RX ADMIN — DULOXETINE 30 MG: 30 CAPSULE, DELAYED RELEASE ORAL at 08:02

## 2019-11-20 RX ADMIN — VORICONAZOLE 200 MG: 200 TABLET, FILM COATED ORAL at 08:02

## 2019-11-20 RX ADMIN — Medication 2 TABLET: at 08:02

## 2019-11-20 RX ADMIN — ACYCLOVIR 400 MG: 800 TABLET ORAL at 08:02

## 2019-11-20 RX ADMIN — POTASSIUM & SODIUM PHOSPHATES POWDER PACK 280-160-250 MG 1 PACKET: 280-160-250 PACK at 08:02

## 2019-11-20 RX ADMIN — POTASSIUM CHLORIDE 20 MEQ: 20 TABLET, EXTENDED RELEASE ORAL at 17:22

## 2019-11-20 RX ADMIN — TBO-FILGRASTIM 480 MCG: 480 INJECTION, SOLUTION SUBCUTANEOUS at 21:46

## 2019-11-20 RX ADMIN — ACETAMINOPHEN 650 MG: 325 TABLET, FILM COATED ORAL at 20:19

## 2019-11-20 RX ADMIN — POTASSIUM CHLORIDE 20 MEQ: 20 TABLET, EXTENDED RELEASE ORAL at 08:02

## 2019-11-20 RX ADMIN — CEFEPIME 2 G: 2 INJECTION, POWDER, FOR SOLUTION INTRAVENOUS at 06:04

## 2019-11-20 RX ADMIN — SODIUM CHLORIDE 250 ML: 900 INJECTION, SOLUTION INTRAVENOUS at 06:04

## 2019-11-20 RX ADMIN — ACETAMINOPHEN 650 MG: 325 TABLET, FILM COATED ORAL at 08:01

## 2019-11-20 RX ADMIN — ACYCLOVIR 400 MG: 800 TABLET ORAL at 17:22

## 2019-11-20 RX ADMIN — CEFEPIME 2 G: 2 INJECTION, POWDER, FOR SOLUTION INTRAVENOUS at 15:54

## 2019-11-20 RX ADMIN — DIPHENHYDRAMINE HYDROCHLORIDE 25 MG: 25 CAPSULE ORAL at 21:45

## 2019-11-20 RX ADMIN — VANCOMYCIN HYDROCHLORIDE 1250 MG: 10 INJECTION, POWDER, LYOPHILIZED, FOR SOLUTION INTRAVENOUS at 21:54

## 2019-11-20 RX ADMIN — VANCOMYCIN HYDROCHLORIDE 1250 MG: 10 INJECTION, POWDER, LYOPHILIZED, FOR SOLUTION INTRAVENOUS at 11:09

## 2019-11-20 RX ADMIN — LORATADINE 10 MG: 10 TABLET ORAL at 08:02

## 2019-11-20 RX ADMIN — ALLOPURINOL 300 MG: 300 TABLET ORAL at 08:02

## 2019-11-20 NOTE — PROGRESS NOTES
[facility] Hematology & Oncology Inpatient Hematology / Oncology Daily Progress Note Reason for Admission:  Admission for antineoplastic chemotherapy [Z51.11] 24 Hour Events: 
Tmax 102, VSS 
UA +UTI, UCx NGTD BCx- + enterococcus/streptococcus Day 13 FLAG-VENITA Awaiting count recovery, on Granix WBC 0 
 
ROS: 
Constitutional: +fatigue +fever CV: Negative for chest pain, palpitations, edema. Respiratory: Negative for dyspnea, cough, wheezing. GI: Negative for nausea, abdominal pain. 10 point review of systems is otherwise negative with the exception of the elements mentioned above in the HPI. No Known Allergies Past Medical History:  
Diagnosis Date  AML (acute myeloid leukemia) ([facility] [address] dx- 4/2019  
 followed by dr Didier Webb  Depression  Hypercholesterolemia  Infection   
 of port -- was placed 5/2019-- removed 6/2019---right chest  
 Psychiatric disorder   
 aniexty  Sleep apnea Past Surgical History:  
Procedure Laterality Date  HX OTHER SURGICAL    
 colonoscopy  HX VASCULAR ACCESS    
 IR INSERT TUNL CVC W PORT OVER 5 YEARS  4/30/2019  IR INSERT TUNL CVC W PORT OVER 5 YEARS  7/15/2019  IR REMOVE TUNL CVAD W PORT/PUMP  6/13/2019 Family History Problem Relation Age of Onset  Cancer Father Social History Socioeconomic History  Marital status:  Spouse name: Not on file  Number of children: Not on file  Years of education: Not on file  Highest education level: Not on file Occupational History  Not on file Social Needs  Financial resource strain: Not on file  Food insecurity:  
  Worry: Not on file Inability: Not on file  Transportation needs:  
  Medical: Not on file Non-medical: Not on file Tobacco Use  Smoking status: Never Smoker  Smokeless tobacco: Never Used Substance and Sexual Activity  Alcohol use: Never Frequency: Never  Drug use: Never  Sexual activity: Not on file Lifestyle  Physical activity:  
  Days per week: Not on file Minutes per session: Not on file  Stress: Not on file Relationships  Social connections:  
  Talks on phone: Not on file Gets together: Not on file Attends Orthodox service: Not on file Active member of club or organization: Not on file Attends meetings of clubs or organizations: Not on file Relationship status: Not on file  Intimate partner violence:  
  Fear of current or ex partner: Not on file Emotionally abused: Not on file Physically abused: Not on file Forced sexual activity: Not on file Other Topics Concern [address] Service Not Asked  Blood Transfusions Not Asked  Caffeine Concern Not Asked  Occupational Exposure Not Asked Charline Fluke Hazards Not Asked  Sleep Concern Not Asked  Stress Concern Not Asked  Weight Concern Not Asked  Special Diet Not Asked  Back Care Not Asked  Exercise Not Asked  Bike Helmet Not Asked  Seat Belt Not Asked  Self-Exams Not Asked Social History Narrative  Not on file Current Facility-Administered Medications Medication Dose Route Frequency Provider Last Rate Last Dose  potassium chloride (K-DUR, KLOR-CON) SR tablet 20 mEq  20 mEq Oral BID Tenzin Yap MD   20 mEq at 11/20/19 0802  
 vancomycin (VANCOCIN) 1250 mg in  ml infusion  1,250 mg IntraVENous Q12H Tenzin Yap .7 mL/hr at 11/20/19 1109 1,250 mg at 11/20/19 1109  
 0.9% sodium chloride infusion 250 mL  250 mL IntraVENous PRN Tenzin Yap MD 15 mL/hr at 11/20/19 0604 250 mL at 11/20/19 5203  potassium, sodium phosphates (NEUTRA-PHOS) packet 1 Packet  1 Packet Oral TID WITH MEALS Ruchi Manning NP   1 Packet at 11/20/19 2942  alum-mag hydroxide-simeth (MYLANTA) oral suspension 30 mL  30 mL Oral Q4H PRN Tenzin Yap MD   30 mL at 11/18/19 0221  cefepime (MAXIPIME) 2 g in 0.9% sodium chloride (MBP/ADV) 100 mL  2 g IntraVENous Q8H Malen Balloon,  mL/hr at 11/20/19 0604 2 g at 11/20/19 0604  
 0.9% sodium chloride infusion 250 mL  250 mL IntraVENous PRN Rai Torres NP      
 loperamide (IMODIUM) capsule 2 mg  2 mg Oral Q4H PRN Malen Balloon, MD   2 mg at 11/19/19 2125  loratadine (CLARITIN) tablet 10 mg  10 mg Oral DAILY Loida SarahARMANDO   10 mg at 11/20/19 3201  sodium chloride 0.9 % bolus infusion 1,000 mL  1,000 mL IntraVENous QHS Loida Smith NP 0 mL/hr at 11/18/19 2215 1,000 mL at 11/19/19 2225  central line flush (saline) syringe 10 mL  10 mL InterCATHeter PRN Malen Balloon, MD   10 mL at 11/18/19 2110  
 docusate sodium (COLACE) capsule 100 mg  100 mg Oral BID PRN Rai Torres NP   100 mg at 11/11/19 1308  LORazepam (ATIVAN) injection 0.5 mg  0.5 mg IntraVENous Q6H PRN Malen Balloon, MD   0.5 mg at 11/17/19 0546  
 saline peripheral flush soln 10 mL  10 mL InterCATHeter PRN Malen Balloon, MD   10 mL at 11/13/19 2154  heparin (porcine) pf 300-500 Units  300-500 Units InterCATHeter PRN Malen Balloon, MD      
 tbo-filgrastim [facility]) injection 480 mcg  480 mcg SubCUTAneous QHS Malemarkus Balloon, MD   480 mcg at 11/19/19 2125  acyclovir (ZOVIRAX) tablet 400 mg  400 mg Oral BID Rai Torres NP   400 mg at 11/20/19 1047  allopurinol (ZYLOPRIM) tablet 300 mg  300 mg Oral DAILY Rai Torres NP   300 mg at 11/20/19 7661  cholecalciferol (VITAMIN D3) (400 Units /10 mcg) tablet 2 Tab  800 Units Oral DAILY Rai Torres NP   2 Tab at 11/20/19 0229  DULoxetine (CYMBALTA) capsule 30 mg  30 mg Oral DAILY Rai Torres NP   30 mg at 11/20/19 3302  lidocaine-prilocaine (EMLA) 2.5-2.5 % cream   Topical PRN Rai Torres NP      
 LORazepam (ATIVAN) tablet 1 mg  1 mg Oral QHS Rai Torres NP   1 mg at 11/19/19 2125  pravastatin (PRAVACHOL) tablet 10 mg  10 mg Oral QHS Rai Torres NP   10 mg at 11/19/19 2125  voriconazole (VFEND) tablet 200 mg  200 mg Oral Q12H Marlene Gin, NP   200 mg at 19 3416  ondansetron (ZOFRAN) injection 4 mg  4 mg IntraVENous Q4H PRN Marlene Gin, NP   4 mg at 19 1455  prochlorperazine (COMPAZINE) with saline injection 5 mg  5 mg IntraVENous Q6H PRN Marlene Gin, NP   5 mg at 19 1632  
 HYDROcodone-acetaminophen (NORCO) 5-325 mg per tablet 1 Tab  1 Tab Oral Q6H PRN Marlene Gin, NP   1 Tab at 19 7444  morphine injection 2 mg  2 mg IntraVENous Q4H PRN Marlene Gin, NP      
 acetaminophen (TYLENOL) tablet 650 mg  650 mg Oral Q6H PRN Yelitza Gonzalez MD   650 mg at 19 4625  diphenhydrAMINE (BENADRYL) capsule 25 mg  25 mg Oral Q6H PRN Yelitza Gonzalez MD   25 mg at 19 1135  acetaminophen/diphenhydrAMINE (TYLENOL PM EXT STR) 500/25 mg (Patient Supplied)   Oral QHS Yelitza Gonzalez MD      
 vit C,H-Vs-wbndv-lutein-zeaxan (PRESERVISION AREDS-2) capsule 1 Cap (Patient Supplied)  [address] Yelitza Gonzalez MD   1 Cap at 19 4312 OBJECTIVE: 
Patient Vitals for the past 8 hrs: 
 BP Temp Pulse Resp SpO2  
19 1145 129/65 98.7 °F (37.1 °C) 90 18 93 % 19 0747 144/59 (!) 102 °F (38.9 °C) 95 18 93 % 19 0430 144/64 99.6 °F (37.6 °C) 88 18 92 % Temp (24hrs), Av.8 °F (37.7 °C), Min:98.7 °F (37.1 °C), Max:102 °F (38.9 °C) 
 
 07 -  1900 In: 360 [P.O.:360] Out: 1100 [Urine:1100] Physical Exam: 
Constitutional: Well developed, well nourished female in no acute distress, sitting on the edge of bed. HEENT: Normocephalic and atraumatic. Oropharynx is clear, mucous membranes are moist. Extraocular muscles are intact. Sclerae anicteric. Neck supple without JVD. No thyromegaly present. Skin Warm and dry. No rash noted. No erythema. No pallor. Bruising noted on BUE/R elbow and abdomen.    
Respiratory Lungs are clear to auscultation bilaterally without wheezes, rales or rhonchi, normal air exchange without accessory muscle use. CVS Normal rate, regular rhythm and normal S1 and S2. No murmurs, gallops, or rubs. Abdomen Soft, mildly tender across lower abd-improving, nondistended, normoactive bowel sounds. No palpable mass. No hepatosplenomegaly. Neuro Grossly nonfocal with no obvious sensory or motor deficits. MSK Normal range of motion in general.  No edema and no tenderness. Psych Appropriate mood and affect. Labs:   
Recent Results (from the past 24 hour(s)) METABOLIC PANEL, COMPREHENSIVE Collection Time: 11/20/19  3:57 AM  
Result Value Ref Range Sodium 144 136 - 145 mmol/L Potassium 3.0 (L) 3.5 - 5.1 mmol/L Chloride 113 (H) 98 - 107 mmol/L  
 CO2 25 21 - 32 mmol/L Anion gap 6 (L) 7 - 16 mmol/L Glucose 86 65 - 100 mg/dL BUN 10 8 - 23 MG/DL Creatinine 0.60 0.6 - 1.0 MG/DL  
 GFR est AA >60 >60 ml/min/1.73m2 GFR est non-AA >60 >60 ml/min/1.73m2 Calcium 7.1 (L) 8.3 - 10.4 MG/DL Bilirubin, total 0.3 0.2 - 1.1 MG/DL  
 ALT (SGPT) 20 12 - 65 U/L  
 AST (SGOT) 8 (L) 15 - 37 U/L Alk. phosphatase 64 50 - 136 U/L Protein, total 5.4 (L) 6.3 - 8.2 g/dL Albumin 1.7 (L) 3.2 - 4.6 g/dL Globulin 3.7 (H) 2.3 - 3.5 g/dL A-G Ratio 0.5 (L) 1.2 - 3.5 MAGNESIUM Collection Time: 11/20/19  3:57 AM  
Result Value Ref Range Magnesium 1.7 (L) 1.8 - 2.4 mg/dL LD Collection Time: 11/20/19  3:57 AM  
Result Value Ref Range  (H) 110 - 210 U/L  
URIC ACID Collection Time: 11/20/19  3:57 AM  
Result Value Ref Range Uric acid 1.7 (L) 2.6 - 6.0 MG/DL  
PHOSPHORUS Collection Time: 11/20/19  3:57 AM  
Result Value Ref Range Phosphorus 2.5 2.3 - 3.7 MG/DL  
CBC WITH AUTOMATED DIFF Collection Time: 11/20/19  3:57 AM  
Result Value Ref Range WBC 0.0 (LL) 4.3 - 11.1 K/uL  
 RBC 2.54 (L) 4.05 - 5.2 M/uL HGB 7.5 (L) 11.7 - 15.4 g/dL HCT 22.2 (L) 35.8 - 46.3 %  MCV 87.4 79.6 - 97.8 FL  
 MCH 29.5 26.1 - 32.9 PG  
 MCHC 33.8 31.4 - 35.0 g/dL  
 RDW 13.6 11.9 - 14.6 % PLATELET 61 (L) 773 - 450 K/uL MPV 10.1 9.4 - 12.3 FL ABSOLUTE NRBC 0.00 0.0 - 0.2 K/uL  
 DF AUTOMATED NEUTROPHILS 100 (H) 43 - 78 % LYMPHOCYTES 0 (L) 13 - 44 % MONOCYTES 0 (L) 4.0 - 12.0 % EOSINOPHILS 0 (L) 0.5 - 7.8 % BASOPHILS 0 0.0 - 2.0 % IMMATURE GRANULOCYTES 0 0.0 - 5.0 %  
 ABS. NEUTROPHILS 0.0 (L) 1.7 - 8.2 K/UL  
 ABS. LYMPHOCYTES 0.0 (L) 0.5 - 4.6 K/UL  
 ABS. MONOCYTES 0.0 (L) 0.1 - 1.3 K/UL  
 ABS. EOSINOPHILS 0.0 0.0 - 0.8 K/UL  
 ABS. BASOPHILS 0.0 0.0 - 0.2 K/UL  
 ABS. IMM. GRANS. 0.0 0.0 - 0.5 K/UL  
VANCOMYCIN, TROUGH Collection Time: 11/20/19  8:00 AM  
Result Value Ref Range Vancomycin,trough 12.6 5 - 20 ug/mL Imaging: 
CT HEAD WO CONT [[id number]] Collected: 11/19/19 1050 Order Status: Completed Updated: 11/19/19 1054 Narrative:    
CT HEAD WITHOUT CONTRAST, 11/19/2019 History: Fall last night hitting left side of head Comparison: . Technique:   5 mm axial scans from the skull base to the vertex. All CT scans 
performed at this facility use one or all of the following: Automated exposure 
control, adjustment of the mA and/or kVp according to patient's size, iterative 
reconstruction. Findings:  No evidence of intracranial hemorrhage is seen. Faint bilateral basal 
ganglia calcifications are seen. No abnormal extra-axial fluid collections are 
seen.  Mild cortical involutional changes are seen which are not felt to be 
abnormal given the patient's age. Margarito Raring evidence for acute hydrocephalus is seen. No evidence of midline shift or herniation is seen.  No abnormal edema pattern 
is seen in a vascular distribution to suggest large artery infarction. Evaluation with bone windows shows no acute osseous changes.  The visualized 
sinuses, middle ears, and mastoid air cells are well aerated.   
Impression:    
IMPRESSION:   
 1.  No acute intracranial process evident by noncontrast CT study of the head. XR CHEST SNGL V [[id number]] Collected: 11/18/19 2041 Order Status: Completed Updated: 11/18/19 2044 Narrative:    
EXAM: XR CHEST SNGL V 
 
INDICATION: neutropenic fever COMPARISON: 9/29/2019 FINDINGS: A portable AP radiograph of the chest was obtained at 1946 hours. The 
patient is on a cardiac monitor. The lungs are clear. The cardiac and 
mediastinal contours and pulmonary vascularity are normal.  The bones and soft 
tissues are grossly within normal limits. Impression:    
IMPRESSION: Normal chest.  
XR ELBOW RT MIN 3 V [[id number]] Collected: 11/10/19 1421 Order Status: Completed Updated: 11/10/19 1424 Narrative:    
Right elbow Clinical location: Elbow pain after feeling a pop, decreased range of motion FINDINGS: Four views of the right elbow show no fracture or dislocation. There 
is no joint effusion. The soft tissues are unremarkable. Impression:    
IMPRESSION: No acute osseous abnormality or joint derangement of the right 
elbow. ASSESSMENT: 
Problem List  Date Reviewed: 10/31/2019 Codes Class Noted Febrile neutropenia (HCC) ICD-10-CM: D70.9, R50.81 ICD-9-CM: 288.00, 780.61  9/21/2019 Pancytopenia due to antineoplastic chemotherapy [facility]) ICD-10-CM: D61.810, T45.1X5A 
ICD-9-CM: 284.11, E933.1  6/12/2019 Cellulitis of neck ICD-10-CM: V33.458 ICD-9-CM: 682.1  6/12/2019 Immunocompromised status associated with infection ICD-10-CM: B99.9 ICD-9-CM: 136.9  6/12/2019 Port or reservoir infection ICD-10-CM: U19.600E ICD-9-CM: 999.33  6/12/2019 Acute myeloid leukemia not having achieved remission ([facility] ICD-10-CM: C92.00 ICD-9-CM: 205.00  5/9/2019 Admission for antineoplastic chemotherapy ICD-10-CM: Z51.11 ICD-9-CM: V58.11  5/5/2019 [facility] myel[facility] ICD-10-CM: C92.00 ICD-9-CM: 205.00  4/28/2019 Weakness generalized ICD-10-CM: R53.1 ICD-9-CM: 780.79  4/28/2019 Pancytopenia ([facility] ICD-10-CM: Y97.998 ICD-9-CM: 284.19  4/28/2019 Thrombocytopenia ([facility] ICD-10-CM: D69.6 ICD-9-CM: 287.5  4/27/2019 Ms. Brittany Man is a 68 y.o. female admitted on 11/7/2019. She is a known patient of Dr. 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 She failed induction with 7+3/Midostaurin. On Dacogen/Gilteritinib with recent BMbx with persistent residual disease. She is being admitted for FLAG-VENITA. She is feeling well and is ready to proceed with treatment. PLAN: 
AML 
- admit for salvage FLAG-VENITA 
- needs Echo prior - ordered 11/8 Day 1 FLAG-VENITA. Echo with EF 55-60%, proceed with tx. 
11/9 Day 2 FLAG-VENITA. Tolerating well, no issues. Uric acid 3.1 today. 11/10 Day 3 FLAG-VENITA. No issues with treatment. Uric acid 3.3 today. 11/12 Day 5 FLAG-VENITA. Tolerating treatment well. G-CSF to start tomorrow. 11/13 Day 6 FLAG-VENITA, doing well, Granix/claritin starts today 11/19 Day 12 FLAG-VENITA. Awaiting count recovery, con't Granix. 11/20 Day 13 FLAG-VENITA. WBC 0. Continue Granix. Pancytopenia secondary to disease - Transfuse prn per [facility] SOPs 
11/19 Transfuse PRBCs today R elbow pain 11/11 c/o R elbow pain with limited ROM yesterday. Xray neg. Pain improved today, noted bruising. Normal ROM on exam. 
 
Mild Abd discomfort/loose stools 11/13 discomfort is improved from yesterday, will monitor 11/14 loose stools, but not watery, can use Imodium prn 
11/15 Imodium working well  
11/16 controlled Neutropenic fever / UTI 
11/19 Tmax 102. 1.  UA +UTI. UCx pending. BCx-NGTD. CXR neg. On Cef/Vanc. DC prophylactic LVQ. 11/20 Tmax 102. BCx positive for streptococcus/enterococcus. Subculture in progress. On Cef/Vanc. Fall 
11/19 s/p last PM.  No LOC. Hit head. CT head neg. Continue home meds Prophylactic Antibx: Acyclovir, Voriconazole [facility] SOPs SCDs for DVT prophylaxis (AC contraindicated d/t thrombocytopenia) Goals and plan of care reviewed with the patient. All questions answered to the best of our ability. Disposition:  Awaiting count recovery. Will need BMbx prior to discharge. Upon discharge will need twice weekly labs w transfusions as needed. Weekly provider visits. RTC within 1 week from discharge. Addendum at 1500:  RN reported wheezing this afternoon. CXR checked and negative. Will consult respiratory to assess and give breathing treatment if needed. Shoshana Mac NP [facility] Hematology & Oncology [address] [place] [address] Office : [phone] Fax : [phone] Attending Addendum: 
I have personally performed a face to face diagnostic evaluation on this patient. I have reviewed and agree with the care plan as documented by Shoshana Mac, N.P. 36 minutes were spent on patient care, including but not limited to, reviewing the chart and time with the patient and family, more than 50% of the time documented was spent in face-to-face contact with the patient and in the care of the patient on the floor/unit where the patient is located. My findings are as follows: She has AML and received FLAG-VENITA, appears anxious, heart rate regular without murmurs, abdomen is non-tender, bowel sounds are positive, we will continue IV ABX for her Alpha-strep sepsis. Bobo Harris MD 
 
 
[facility] Hematology/Oncology [address] [place] [address] Office : [phone] Fax : [phone]

## 2019-11-20 NOTE — PROGRESS NOTES
Problem: Falls - Risk of 
Goal: *Absence of Falls Description Document Benita Hanaford Fall Risk and appropriate interventions in the flowsheet. Outcome: Progressing Towards Goal 
Note: Fall Risk Interventions: 
Mobility Interventions: Patient to call before getting OOB Medication Interventions: Teach patient to arise slowly Elimination Interventions: Call light in reach History of Falls Interventions: Investigate reason for fall, Room close to nurse's station Problem: Patient Education: Go to Patient Education Activity Goal: Patient/Family Education Outcome: Progressing Towards Goal

## 2019-11-20 NOTE — PROGRESS NOTES
Received report from Kalyani Barros RN. Pt resting in bed with eyes closed, will continue to monitor.

## 2019-11-20 NOTE — PROGRESS NOTES
Pharmacokinetic Consult to Pharmacist 
 
Angiemelody Wolfe is a 68 y.o. female being treated for febrile neutropenia with vancomycin. Height: 5' 6\" (167.6 cm)  Weight: 88.9 kg (196 lb) Lab Results Component Value Date/Time BUN 10 11/20/2019 03:57 AM  
 Creatinine 0.60 11/20/2019 03:57 AM  
 WBC 0.0 (LL) 11/20/2019 03:57 AM  
 Procalcitonin 0.1 09/21/2019 06:50 PM  
 Lactic Acid (POC) 0.67 09/21/2019 08:51 PM  
  
Estimated Creatinine Clearance: 80.3 mL/min (by C-G formula based on SCr of 0.6 mg/dL). CULTURES: 
Results Procedure Component Value Units Date/Time [facility] [[id number]] Order Status:  Sent Specimen:  Stool Jose Dang [[id number]] Order Status:  Sent Specimen:  Stool CULTURE, URINE [[id number]] Collected:  11/19/19 0102 Order Status:  Completed Specimen:  Urine from Clean catch Updated:  11/20/19 3427 Special Requests: NO SPECIAL REQUESTS Culture result: NO GROWTH 1 DAY     
 CULTURE, BLOOD [[id number]] Collected:  11/18/19 2032 Order Status:  Completed Specimen:  Blood Updated:  11/20/19 1080 Special Requests: --     
  RIGHT 
HAND Culture result: NO GROWTH 2 DAYS     
 CULTURE, BLOOD [[id number]]  (Abnormal) Collected:  11/18/19 1955 Order Status:  Completed Specimen:  Blood Updated:  11/20/19 1438 Special Requests: LATERAL PORT     
  GRAM STAIN GRAM POS COCCI IN CHAINS AEROBIC AND ANAEROBIC BOTTLES RESULTS VERIFIED, PHONED TO AND READ BACK BY EJ CORREA RN @ 8805 ON 11/19/2019 BY [facility] Culture result:    
  PRESUMPTIVE ENTEROCOCCUS SPECIES SUBCULTURE IN PROGRESS ALPHA STREPTOCOCCUS SUBCULTURE IN PROGRESS  
     
   REFER TO [address] Dr PANEL [id number] BLOOD CULTURE ID PANEL [[id number]]  (Abnormal) Collected:  11/18/19 1955 Order Status:  Completed Specimen:  Blood Updated:  11/19/19 1420 Acc. no. from [facility] [id number]   Enterococcus DETECTED     
 Streptococcus DETECTED Comment: RESULTS VERIFIED, PHONED TO AND READ BACK BY 
EJ CORREA RN @ 4444 ON 11/19/2019 BY [facility] ALMODOVAR/ZARIA (Vancomycin Resistance Gene) NOT DETECTED Clinical Consideration Multiple organisms detected. Results do not replace susceptibility testing. Lab Results Component Value Date/Time Vancomycin,trough 12.6 11/20/2019 08:00 AM  
 
 
Day 3 of vancomycin. Goal trough is 15-20. Will increase vancomycin to 1250mg q 12 hours. Will continue to follow patient. Thank you, Abdoulaye Hernandes, PharmD

## 2019-11-20 NOTE — PROGRESS NOTES
END OF SHIFT NOTE: 
 
Intake/Output 11/20 0701 - 11/20 1900 In: 5233 [P.O.:720; I.V.:373] Out: 1400 [[facility]:9732] Voiding: YES Catheter: NO 
Drain:   
 
 
 
 
 
Stool:  0 occurrences. Stool Assessment Stool Color: Debra Duet (11/20/19 0513) Stool Appearance: Soft (11/20/19 0513) Stool Amount: Large (11/20/19 0513) Stool Source/Status: Rectum (11/20/19 0513) Emesis:  0 occurrences. VITAL SIGNS Patient Vitals for the past 12 hrs: 
 Temp Pulse Resp BP SpO2  
11/20/19 1511 97.8 °F (36.6 °C) 90 20 184/88 94 % 11/20/19 1145 98.7 °F (37.1 °C) 90 18 129/65 93 % 11/20/19 0747 (!) 102 °F (38.9 °C) 95 18 144/59 93 % Pain Assessment Pain 1 Pain Scale 1: Numeric (0 - 10) (11/20/19 1511) Pain Intensity 1: 0 (11/20/19 1511) Patient Stated Pain Goal: 0 (11/20/19 1511) Pain Reassessment 1: Yes (11/20/19 1511) Pain Onset 1: suddenly (11/10/19 1000) Pain Location 1: Leg (11/11/19 0505) Pain Orientation 1: Left;Right (11/11/19 0505) Pain Description 1: Aching (11/11/19 0505) Pain Intervention(s) 1: Medication (see MAR) (11/11/19 0505) Ambulating Yes Additional Information: Pt not feeling well today. Vanc trough drawn resulted at 12.6. Pt still febrile. Around 3 pm pt complained of being SOB. Pt placed on 2L NC and Christine NP notified. New orders for CXR. CXR negative. Resp consulted. No complaints noted at this time. Shift report given to Kailyn Rosales RN oncoming nurse at the bedside.  
 
Adri Martinez RN

## 2019-11-21 ENCOUNTER — APPOINTMENT (OUTPATIENT)
Dept: MRI IMAGING | Age: 74
DRG: 837 | End: 2019-11-21
Attending: PSYCHIATRY & NEUROLOGY
Payer: MEDICARE

## 2019-11-21 LAB
ALBUMIN SERPL-MCNC: 1.8 G/DL (ref 3.2–4.6)
ALBUMIN/GLOB SERPL: 0.5 {RATIO} (ref 1.2–3.5)
ALP SERPL-CCNC: 71 U/L (ref 50–136)
ALT SERPL-CCNC: 23 U/L (ref 12–65)
ANION GAP SERPL CALC-SCNC: 6 MMOL/L (ref 7–16)
AST SERPL-CCNC: 13 U/L (ref 15–37)
BACTERIA SPEC CULT: NORMAL
BASOPHILS # BLD: 0 K/UL (ref 0–0.2)
BASOPHILS NFR BLD: 0 % (ref 0–2)
BILIRUB SERPL-MCNC: 0.3 MG/DL (ref 0.2–1.1)
BUN SERPL-MCNC: 8 MG/DL (ref 8–23)
CALCIUM SERPL-MCNC: 7.2 MG/DL (ref 8.3–10.4)
CHLORIDE SERPL-SCNC: 115 MMOL/L (ref 98–107)
CO2 SERPL-SCNC: 24 MMOL/L (ref 21–32)
CREAT SERPL-MCNC: 0.53 MG/DL (ref 0.6–1)
DIFFERENTIAL METHOD BLD: ABNORMAL
EOSINOPHIL # BLD: 0 K/UL (ref 0–0.8)
EOSINOPHIL NFR BLD: 0 % (ref 0.5–7.8)
ERYTHROCYTE [DISTWIDTH] IN BLOOD BY AUTOMATED COUNT: 14 % (ref 11.9–14.6)
GLOBULIN SER CALC-MCNC: 3.6 G/DL (ref 2.3–3.5)
GLUCOSE SERPL-MCNC: 91 MG/DL (ref 65–100)
HCT VFR BLD AUTO: 22.5 % (ref 35.8–46.3)
HGB BLD-MCNC: 7.6 G/DL (ref 11.7–15.4)
IMM GRANULOCYTES # BLD AUTO: 0 K/UL (ref 0–0.5)
IMM GRANULOCYTES NFR BLD AUTO: 0 % (ref 0–5)
LDH SERPL L TO P-CCNC: 278 U/L (ref 110–210)
LYMPHOCYTES # BLD: 0 K/UL (ref 0.5–4.6)
LYMPHOCYTES NFR BLD: 0 % (ref 13–44)
MAGNESIUM SERPL-MCNC: 1.8 MG/DL (ref 1.8–2.4)
MCH RBC QN AUTO: 29.6 PG (ref 26.1–32.9)
MCHC RBC AUTO-ENTMCNC: 33.8 G/DL (ref 31.4–35)
MCV RBC AUTO: 87.5 FL (ref 79.6–97.8)
MONOCYTES # BLD: 0 K/UL (ref 0.1–1.3)
MONOCYTES NFR BLD: 0 % (ref 4–12)
NEUTS SEG # BLD: 0 K/UL (ref 1.7–8.2)
NEUTS SEG NFR BLD: 100 % (ref 43–78)
NRBC # BLD: 0 K/UL (ref 0–0.2)
PHOSPHATE SERPL-MCNC: 2.3 MG/DL (ref 2.3–3.7)
PLATELET # BLD AUTO: 47 K/UL (ref 150–450)
PMV BLD AUTO: 10.7 FL (ref 9.4–12.3)
POTASSIUM SERPL-SCNC: 3 MMOL/L (ref 3.5–5.1)
PROT SERPL-MCNC: 5.4 G/DL (ref 6.3–8.2)
RBC # BLD AUTO: 2.57 M/UL (ref 4.05–5.2)
SERVICE CMNT-IMP: NORMAL
SODIUM SERPL-SCNC: 145 MMOL/L (ref 136–145)
URATE SERPL-MCNC: 1.8 MG/DL (ref 2.6–6)
WBC # BLD AUTO: 0 K/UL (ref 4.3–11.1)

## 2019-11-21 PROCEDURE — 83735 ASSAY OF MAGNESIUM: CPT

## 2019-11-21 PROCEDURE — 77010033678 HC OXYGEN DAILY

## 2019-11-21 PROCEDURE — 74011250637 HC RX REV CODE- 250/637: Performed by: INTERNAL MEDICINE

## 2019-11-21 PROCEDURE — 74011250637 HC RX REV CODE- 250/637: Performed by: NURSE PRACTITIONER

## 2019-11-21 PROCEDURE — 94760 N-INVAS EAR/PLS OXIMETRY 1: CPT

## 2019-11-21 PROCEDURE — 70553 MRI BRAIN STEM W/O & W/DYE: CPT

## 2019-11-21 PROCEDURE — 77030027138 HC INCENT SPIROMETER -A

## 2019-11-21 PROCEDURE — 36591 DRAW BLOOD OFF VENOUS DEVICE: CPT

## 2019-11-21 PROCEDURE — A9575 INJ GADOTERATE MEGLUMI 0.1ML: HCPCS | Performed by: INTERNAL MEDICINE

## 2019-11-21 PROCEDURE — 74011250636 HC RX REV CODE- 250/636: Performed by: INTERNAL MEDICINE

## 2019-11-21 PROCEDURE — 84100 ASSAY OF PHOSPHORUS: CPT

## 2019-11-21 PROCEDURE — 74011250636 HC RX REV CODE- 250/636: Performed by: NURSE PRACTITIONER

## 2019-11-21 PROCEDURE — 74011000258 HC RX REV CODE- 258: Performed by: INTERNAL MEDICINE

## 2019-11-21 PROCEDURE — 87040 BLOOD CULTURE FOR BACTERIA: CPT

## 2019-11-21 PROCEDURE — 65270000029 HC RM PRIVATE

## 2019-11-21 PROCEDURE — 84550 ASSAY OF BLOOD/URIC ACID: CPT

## 2019-11-21 PROCEDURE — 83615 LACTATE (LD) (LDH) ENZYME: CPT

## 2019-11-21 PROCEDURE — 74011000250 HC RX REV CODE- 250: Performed by: NURSE PRACTITIONER

## 2019-11-21 PROCEDURE — 36415 COLL VENOUS BLD VENIPUNCTURE: CPT

## 2019-11-21 PROCEDURE — 99233 SBSQ HOSP IP/OBS HIGH 50: CPT | Performed by: INTERNAL MEDICINE

## 2019-11-21 PROCEDURE — 94640 AIRWAY INHALATION TREATMENT: CPT

## 2019-11-21 PROCEDURE — 65270000015 HC RM PRIVATE ONCOLOGY

## 2019-11-21 PROCEDURE — 77030003560 HC NDL HUBR BARD -A

## 2019-11-21 PROCEDURE — 85025 COMPLETE CBC W/AUTO DIFF WBC: CPT

## 2019-11-21 PROCEDURE — 99222 1ST HOSP IP/OBS MODERATE 55: CPT | Performed by: PSYCHIATRY & NEUROLOGY

## 2019-11-21 PROCEDURE — 80053 COMPREHEN METABOLIC PANEL: CPT

## 2019-11-21 PROCEDURE — 77030020256 HC SOL INJ NACL 0.9%  500ML

## 2019-11-21 RX ORDER — POTASSIUM CHLORIDE 29.8 MG/ML
40 INJECTION INTRAVENOUS ONCE
Status: COMPLETED | OUTPATIENT
Start: 2019-11-21 | End: 2019-11-21

## 2019-11-21 RX ORDER — SODIUM CHLORIDE 0.9 % (FLUSH) 0.9 %
10 SYRINGE (ML) INJECTION
Status: COMPLETED | OUTPATIENT
Start: 2019-11-21 | End: 2019-11-21

## 2019-11-21 RX ORDER — GADOTERATE MEGLUMINE 376.9 MG/ML
18 INJECTION INTRAVENOUS
Status: COMPLETED | OUTPATIENT
Start: 2019-11-21 | End: 2019-11-21

## 2019-11-21 RX ORDER — BACLOFEN 10 MG/1
5 TABLET ORAL
Status: DISCONTINUED | OUTPATIENT
Start: 2019-11-21 | End: 2019-12-12 | Stop reason: HOSPADM

## 2019-11-21 RX ORDER — ALBUTEROL SULFATE 0.83 MG/ML
2.5 SOLUTION RESPIRATORY (INHALATION)
Status: DISCONTINUED | OUTPATIENT
Start: 2019-11-21 | End: 2019-11-21 | Stop reason: SDUPTHER

## 2019-11-21 RX ADMIN — GADOTERATE MEGLUMINE 18 ML: 376.9 INJECTION INTRAVENOUS at 22:34

## 2019-11-21 RX ADMIN — ACYCLOVIR 400 MG: 800 TABLET ORAL at 16:50

## 2019-11-21 RX ADMIN — POTASSIUM & SODIUM PHOSPHATES POWDER PACK 280-160-250 MG 1 PACKET: 280-160-250 PACK at 16:54

## 2019-11-21 RX ADMIN — PRAVASTATIN SODIUM 10 MG: 20 TABLET ORAL at 23:14

## 2019-11-21 RX ADMIN — VORICONAZOLE 200 MG: 200 TABLET, FILM COATED ORAL at 23:14

## 2019-11-21 RX ADMIN — VORICONAZOLE 200 MG: 200 TABLET, FILM COATED ORAL at 08:37

## 2019-11-21 RX ADMIN — TBO-FILGRASTIM 480 MCG: 480 INJECTION, SOLUTION SUBCUTANEOUS at 23:15

## 2019-11-21 RX ADMIN — ACYCLOVIR 400 MG: 800 TABLET ORAL at 08:37

## 2019-11-21 RX ADMIN — ALBUTEROL SULFATE 2.5 MG: 2.5 SOLUTION RESPIRATORY (INHALATION) at 16:00

## 2019-11-21 RX ADMIN — Medication 2 TABLET: at 08:36

## 2019-11-21 RX ADMIN — BACLOFEN 5 MG: 10 TABLET ORAL at 13:35

## 2019-11-21 RX ADMIN — VANCOMYCIN HYDROCHLORIDE 1250 MG: 10 INJECTION, POWDER, LYOPHILIZED, FOR SOLUTION INTRAVENOUS at 11:48

## 2019-11-21 RX ADMIN — POTASSIUM CHLORIDE 40 MEQ: 400 INJECTION, SOLUTION INTRAVENOUS at 11:40

## 2019-11-21 RX ADMIN — LORAZEPAM 1 MG: 1 TABLET ORAL at 23:14

## 2019-11-21 RX ADMIN — VANCOMYCIN HYDROCHLORIDE 1250 MG: 10 INJECTION, POWDER, LYOPHILIZED, FOR SOLUTION INTRAVENOUS at 23:15

## 2019-11-21 RX ADMIN — ALUMINUM HYDROXIDE, MAGNESIUM HYDROXIDE, AND SIMETHICONE 30 ML: 200; 200; 20 SUSPENSION ORAL at 04:58

## 2019-11-21 RX ADMIN — LORATADINE 10 MG: 10 TABLET ORAL at 08:37

## 2019-11-21 RX ADMIN — CEFEPIME 2 G: 2 INJECTION, POWDER, FOR SOLUTION INTRAVENOUS at 00:59

## 2019-11-21 RX ADMIN — POTASSIUM CHLORIDE 20 MEQ: 20 TABLET, EXTENDED RELEASE ORAL at 08:37

## 2019-11-21 RX ADMIN — POTASSIUM & SODIUM PHOSPHATES POWDER PACK 280-160-250 MG 1 PACKET: 280-160-250 PACK at 08:36

## 2019-11-21 RX ADMIN — ALLOPURINOL 300 MG: 300 TABLET ORAL at 08:37

## 2019-11-21 RX ADMIN — CEFEPIME 2 G: 2 INJECTION, POWDER, FOR SOLUTION INTRAVENOUS at 16:51

## 2019-11-21 RX ADMIN — CEFEPIME 2 G: 2 INJECTION, POWDER, FOR SOLUTION INTRAVENOUS at 08:36

## 2019-11-21 RX ADMIN — POTASSIUM & SODIUM PHOSPHATES POWDER PACK 280-160-250 MG 1 PACKET: 280-160-250 PACK at 11:55

## 2019-11-21 RX ADMIN — POTASSIUM CHLORIDE 20 MEQ: 20 TABLET, EXTENDED RELEASE ORAL at 16:51

## 2019-11-21 RX ADMIN — SODIUM CHLORIDE 500 ML: 900 INJECTION, SOLUTION INTRAVENOUS at 23:15

## 2019-11-21 RX ADMIN — DULOXETINE 30 MG: 30 CAPSULE, DELAYED RELEASE ORAL at 08:37

## 2019-11-21 RX ADMIN — DIPHENHYDRAMINE HYDROCHLORIDE 25 MG: 25 CAPSULE ORAL at 23:14

## 2019-11-21 RX ADMIN — Medication 10 ML: at 22:34

## 2019-11-21 NOTE — CONSULTS
Consult Patient: Remington Mustafa MRN: [id number]  SSN: [id number] YOB: 1945  Age: 68 y.o. Sex: female Assessment:  
 
Isolated cranial nerve 6th  Palsy AML Hospital Problems  Date Reviewed: 10/31/2019 Codes Class Noted POA Admission for antineoplastic chemotherapy ICD-10-CM: Z51.11 ICD-9-CM: V58.11  5/5/2019 Yes Plan: MRI of brain with and without contrast contrast 
 
Lumbar puncture under fluoroscopy with cytology and flow cytometry. Patient will require platelet transfusion prior to this procedure Subjective:  
  
Remington Mustafa is a 68 y.o. female who is being seen for evaluation of double vision. The patient describes onset of double vision yesterday. She states that she tends to see 2 images when looking across the room at the television but that this goes away when she looks closer up. The image separation is horizontal and the duplicate image disappears with closing one eye. She denies any neurological complaints such as numbness weakness or headache. She did recently lose balance and fall. Noncontrast CT of the head was performed which was negative. Past Medical History:  
Diagnosis Date  AML (acute myeloid leukemia) ([facility] [address] dx- 4/2019  
 followed by dr Madhav Morris  Depression  Hypercholesterolemia  Infection   
 of port -- was placed 5/2019-- removed 6/2019---right chest  
 Psychiatric disorder   
 aniexty  Sleep apnea Past Surgical History:  
Procedure Laterality Date  HX OTHER SURGICAL    
 colonoscopy  HX VASCULAR ACCESS    
 IR INSERT TUNL CVC W PORT OVER 5 YEARS  4/30/2019  IR INSERT TUNL CVC W PORT OVER 5 YEARS  7/15/2019  IR REMOVE TUNL CVAD W PORT/PUMP  6/13/2019 Family History Problem Relation Age of Onset  Cancer Father Social History Tobacco Use  Smoking status: Never Smoker  Smokeless tobacco: Never Used Substance Use Topics  Alcohol use: Never Frequency: Never Current Facility-Administered Medications Medication Dose Route Frequency Provider Last Rate Last Dose  potassium chloride 40 mEq IVPB  40 mEq IntraVENous ONCE Ayad Neal MD      
 sodium chloride 0.9 % bolus infusion 500 mL  500 mL IntraVENous QHS Libia Rapp NP      
 potassium chloride (K-DUR, KLOR-CON) SR tablet 20 mEq  20 mEq Oral BID Ayad Neal MD   20 mEq at 11/21/19 0837  
 vancomycin (VANCOCIN) 1250 mg in  ml infusion  1,250 mg IntraVENous Q12H Ayad Neal .7 mL/hr at 11/20/19 2154 1,250 mg at 11/20/19 2154  albuterol (PROVENTIL VENTOLIN) nebulizer solution 2.5 mg  2.5 mg Nebulization Q6H PRN Christine Jay NP      
 0.9% sodium chloride infusion 250 mL  250 mL IntraVENous PRN Ayad Neal MD 15 mL/hr at 11/20/19 0604 250 mL at 11/20/19 0269  potassium, sodium phosphates (NEUTRA-PHOS) packet 1 Packet  1 Packet Oral TID WITH MEALS Ene Blair NP   1 Packet at 11/21/19 0836  
 alum-mag hydroxide-simeth (MYLANTA) oral suspension 30 mL  30 mL Oral Q4H PRN Ayad Neal MD   30 mL at 11/21/19 9358  cefepime (MAXIPIME) 2 g in 0.9% sodium chloride (MBP/ADV) 100 mL  2 g IntraVENous Q8H Ayad Neal  mL/hr at 11/21/19 0836 2 g at 11/21/19 0836  
 0.9% sodium chloride infusion 250 mL  250 mL IntraVENous PRN Ene Blair NP      
 loperamide (IMODIUM) capsule 2 mg  2 mg Oral Q4H PRN Ayad Neal MD   2 mg at 11/19/19 2125  loratadine (CLARITIN) tablet 10 mg  10 mg Oral DAILY Erik Bedoya NP   10 mg at 11/21/19 0837  
 central line flush (saline) syringe 10 mL  10 mL InterCATHeter PRN Ayad Neal MD   10 mL at 11/18/19 2110  
 docusate sodium (COLACE) capsule 100 mg  100 mg Oral BID PRN Ene Blair NP   100 mg at 11/11/19 1308  LORazepam (ATIVAN) injection 0.5 mg  0.5 mg IntraVENous Q6H PRN Ayad Neal MD   0.5 mg at 11/17/19 5200  saline peripheral flush soln 10 mL  10 mL InterCATHeter PRN Ayad Neal MD   10 mL at 11/13/19 2154  heparin (porcine) pf 300-500 Units  300-500 Units InterCATHeter PRN Ayad Neal MD      
 tbo-filgrastim [facility]) injection 480 mcg  480 mcg SubCUTAneous QHS Ayad Neal MD   480 mcg at 11/20/19 2146  acyclovir (ZOVIRAX) tablet 400 mg  400 mg Oral BID [facility], NP   400 mg at 11/21/19 0837  
 allopurinol (ZYLOPRIM) tablet 300 mg  300 mg Oral DAILY [facility], NP   300 mg at 11/21/19 2449  cholecalciferol (VITAMIN D3) (400 Units /10 mcg) tablet 2 Tab  800 Units Oral DAILY [facility], NP   2 Tab at 11/21/19 0140  DULoxetine (CYMBALTA) capsule 30 mg  30 mg Oral DAILY [facility], NP   30 mg at 11/21/19 6321  lidocaine-prilocaine (EMLA) 2.5-2.5 % cream   Topical PRN Ene Blair, NP      
 LORazepam (ATIVAN) tablet 1 mg  1 mg Oral QHS [facility], NP   1 mg at 11/20/19 2146  pravastatin (PRAVACHOL) tablet 10 mg  [facility]   10 mg at 11/20/19 2146  voriconazole (VFEND) tablet 200 mg  200 mg Oral Q12H [facility], NP   200 mg at 11/21/19 0837  
 ondansetron (ZOFRAN) injection 4 mg  4 mg IntraVENous Q4H PRN Ene Blair, NP   4 mg at 11/18/19 1455  prochlorperazine (COMPAZINE) with saline injection 5 mg  5 mg IntraVENous Q6H PRN Ene Blair, NP   5 mg at 11/18/19 1632  
 HYDROcodone-acetaminophen (NORCO) 5-325 mg per tablet 1 Tab  1 Tab Oral Q6H PRN Ene Blair, NP   1 Tab at 11/11/19 5950  morphine injection 2 mg  2 mg IntraVENous Q4H PRN Ene Blair, NP      
 acetaminophen (TYLENOL) tablet 650 mg  650 mg Oral Q6H PRN Ayad Neal MD   650 mg at 11/20/19 2019  diphenhydrAMINE (BENADRYL) capsule 25 mg  25 mg Oral Q6H PRN Ayad Neal MD   25 mg at 11/20/19 2145  acetaminophen/diphenhydrAMINE (TYLENOL PM EXT STR) 500/25 mg (Patient Supplied)   Oral QHS Ayad Neal MD   Stopped at 11/20/19 2200  vit C,M-Ej-suike-lutein-zeaxan (PRESERVISION AREDS-2) capsule 1 Cap (Patient Supplied)  1 Cap Oral DAILY Sasha Graf MD   1 Cap at 11/21/19 1024 No Known Allergies Review of Systems: 
Complains of fatigue nausea weight loss denies headache. 12 point review of systems is otherwise negative Objective:  
 
Vitals:  
 11/20/19 2053 11/20/19 2218 11/21/19 0300 11/21/19 7726 BP:  151/71 164/78 148/75 Pulse:  87 80 93 Resp:  18 18 16 Temp: (!) 101.1 °F (38.4 °C) 98.9 °F (37.2 °C) 99.4 °F (37.4 °C) 99.9 °F (37.7 °C) SpO2:  97% 96% 97% Weight:      
Height:      
  
 
Physical Exam: 
 
General: well nourished, appears stated age Eyes: no proptosis or exophthalmos; conjunctivae clear, sclerae non-icteric Chest: clear to auscultation Cardiac: normal S1 S2; no murmurs gallop or rubs Neurological: MSE: alert, oriented times 3; fluent speech; no paraphasic errors; follows commands without difficulty CN 2: visual fields full; no afferent pupillary defect; VA not checked; fundoscopic exam ... CN 3,4,6: Pupils symmetrical in size, reactive to light directly and consensually; no ptosis; full versions and ductions. Cross cover testing to near target is negative CN 5: facial sensation intact to light touch and pin prick. Corneal reflex . .. CN 7: Symmetrical facial tone and movements CN 8 responds to spoken voice CN 9,10; palate symmetrical gag intact CN 11: head turn and shoulder shrug intact CN 12: tongue midline without atrophy or fasiculations Motor: 
Power 5/5 UE and LE proximal to distal 
Fine motor movements symmetrical 
Tone normal 
Atrophy: absent Gait: symmetrical arm swing, rises on heels and toes Cerebellar: 
Finger to nose; heel to shin intact Tandem intact Sensory Romberg negative Intact to primary modalities in all 4 extremities Reflexes Symmetrical and normally active at 2+ in UE and LE Plantar response flexor bilaterally Recent Results (from the past 24 hour(s)) CULTURE, BLOOD Collection Time: 11/20/19  8:28 PM  
Result Value Ref Range Special Requests: PORT Culture result: NO GROWTH AFTER 9 HOURS METABOLIC PANEL, COMPREHENSIVE Collection Time: 11/21/19  3:39 AM  
Result Value Ref Range Sodium 145 136 - 145 mmol/L Potassium 3.0 (L) 3.5 - 5.1 mmol/L Chloride 115 (H) 98 - 107 mmol/L  
 CO2 24 21 - 32 mmol/L Anion gap 6 (L) 7 - 16 mmol/L Glucose 91 65 - 100 mg/dL BUN 8 8 - 23 MG/DL Creatinine 0.53 (L) 0.6 - 1.0 MG/DL  
 GFR est AA >60 >60 ml/min/1.73m2 GFR est non-AA >60 >60 ml/min/1.73m2 Calcium 7.2 (L) 8.3 - 10.4 MG/DL Bilirubin, total 0.3 0.2 - 1.1 MG/DL  
 ALT (SGPT) 23 12 - 65 U/L  
 AST (SGOT) 13 (L) 15 - 37 U/L Alk. phosphatase 71 50 - 136 U/L Protein, total 5.4 (L) 6.3 - 8.2 g/dL Albumin 1.8 (L) 3.2 - 4.6 g/dL Globulin 3.6 (H) 2.3 - 3.5 g/dL A-G Ratio 0.5 (L) 1.2 - 3.5 MAGNESIUM Collection Time: 11/21/19  3:39 AM  
Result Value Ref Range Magnesium 1.8 1.8 - 2.4 mg/dL LD Collection Time: 11/21/19  3:39 AM  
Result Value Ref Range  (H) 110 - 210 U/L  
URIC ACID Collection Time: 11/21/19  3:39 AM  
Result Value Ref Range Uric acid 1.8 (L) 2.6 - 6.0 MG/DL  
PHOSPHORUS Collection Time: 11/21/19  3:39 AM  
Result Value Ref Range Phosphorus 2.3 2.3 - 3.7 MG/DL  
CBC WITH AUTOMATED DIFF Collection Time: 11/21/19  3:39 AM  
Result Value Ref Range WBC 0.0 (LL) 4.3 - 11.1 K/uL  
 RBC 2.57 (L) 4.05 - 5.2 M/uL HGB 7.6 (L) 11.7 - 15.4 g/dL HCT 22.5 (L) 35.8 - 46.3 % MCV 87.5 79.6 - 97.8 FL  
 MCH 29.6 26.1 - 32.9 PG  
 MCHC 33.8 31.4 - 35.0 g/dL  
 RDW 14.0 11.9 - 14.6 % PLATELET 47 (L) 852 - 450 K/uL MPV 10.7 9.4 - 12.3 FL ABSOLUTE NRBC 0.00 0.0 - 0.2 K/uL  
 DF AUTOMATED NEUTROPHILS 100 (H) 43 - 78 % LYMPHOCYTES 0 (L) 13 - 44 % MONOCYTES 0 (L) 4.0 - 12.0 % EOSINOPHILS 0 (L) 0.5 - 7.8 % BASOPHILS 0 0.0 - 2.0 % IMMATURE GRANULOCYTES 0 0.0 - 5.0 %  
 ABS. NEUTROPHILS 0.0 (L) 1.7 - 8.2 K/UL  
 ABS. LYMPHOCYTES 0.0 (L) 0.5 - 4.6 K/UL  
 ABS. MONOCYTES 0.0 (L) 0.1 - 1.3 K/UL  
 ABS. EOSINOPHILS 0.0 0.0 - 0.8 K/UL  
 ABS. BASOPHILS 0.0 0.0 - 0.2 K/UL  
 ABS. IMM. GRANS. 0.0 0.0 - 0.5 K/UL No results found for: CHOL, CHOLPOCT, CHOLX, CHLST, CHOLV, HDL, HDLPOC, HDLP, LDL, LDLCPOC, LDLC, DLDLP, VLDLC, VLDL, TGLX, TRIGL, TRIGP, TGLPOCT, CHHD, CHHDX No results found for: HBA1C, HGBE8, [id number], [id number]  
 
CT Results (most recent): 
Results from Hospital Encounter encounter on 11/07/19 CT HEAD WO CONT Narrative CT HEAD WITHOUT CONTRAST, 11/19/2019 History: Fall last night hitting left side of head Comparison: . Technique:   5 mm axial scans from the skull base to the vertex. All CT scans 
performed at this facility use one or all of the following: Automated exposure 
control, adjustment of the mA and/or kVp according to patient's size, iterative 
reconstruction. Findings:  No evidence of intracranial hemorrhage is seen. Faint bilateral basal 
ganglia calcifications are seen. No abnormal extra-axial fluid collections are 
seen. Mild cortical involutional changes are seen which are not felt to be 
abnormal given the patient's age. No evidence for acute hydrocephalus is seen. No evidence of midline shift or herniation is seen. No abnormal edema pattern 
is seen in a vascular distribution to suggest large artery infarction. Evaluation with bone windows shows no acute osseous changes. The visualized 
sinuses, middle ears, and mastoid air cells are well aerated. Impression IMPRESSION:   
1. No acute intracranial process evident by noncontrast CT study of the head. Results for orders placed or performed during the hospital encounter of 09/21/19 EKG, 12 LEAD, INITIAL Result Value Ref Range  Ventricular Rate 69 BPM  
 Atrial Rate 69 BPM  
 P-R Interval 202 ms QRS Duration 100 ms Q-T Interval 426 ms  
 QTC Calculation (Bezet) 456 ms Calculated P Axis 71 degrees Calculated R Axis -36 degrees Calculated T Axis 55 degrees Diagnosis Normal sinus rhythm Left axis deviation Voltage criteria for left ventricular hypertrophy Abnormal ECG When compared with ECG of 13-SEP-2019 11:14, No significant change was found Confirmed by Luis Wood ([id number]) on 9/22/2019 6:32:52 PM 
  
  
 
MRI Results (most recent): No results found for this or any previous visit. US Results (most recent): No results found for this or any previous visit. Most recent CTA Results from Hospital Encounter encounter on 11/07/19 CT HEAD WO CONT Narrative CT HEAD WITHOUT CONTRAST, 11/19/2019 History: Fall last night hitting left side of head Comparison: . Technique:   5 mm axial scans from the skull base to the vertex. All CT scans 
performed at this facility use one or all of the following: Automated exposure 
control, adjustment of the mA and/or kVp according to patient's size, iterative 
reconstruction. Findings:  No evidence of intracranial hemorrhage is seen. Faint bilateral basal 
ganglia calcifications are seen. No abnormal extra-axial fluid collections are 
seen. Mild cortical involutional changes are seen which are not felt to be 
abnormal given the patient's age. No evidence for acute hydrocephalus is seen. No evidence of midline shift or herniation is seen. No abnormal edema pattern 
is seen in a vascular distribution to suggest large artery infarction. Evaluation with bone windows shows no acute osseous changes. The visualized 
sinuses, middle ears, and mastoid air cells are well aerated. Impression IMPRESSION:   
1. No acute intracranial process evident by noncontrast CT study of the head. Most recent MRI No results found for this or any previous visit. Signed By: Paco Vargas MD   
 November 21, 2019

## 2019-11-21 NOTE — PROGRESS NOTES
[facility] Hematology & Oncology Inpatient Hematology / Oncology Daily Progress Note Reason for Admission:  Admission for antineoplastic chemotherapy [Z51.11] 24 Hour Events: 
Weak and fatigued BCx- + enterococcus/streptococcus Day 14 FLAG-VENITA Awaiting count recovery, on Granix WBC 0 
 
 
ROS: 
Constitutional: +fatigue +fever CV: Negative for chest pain, palpitations, edema. Respiratory: Negative for dyspnea, cough, wheezing. GI: Negative for nausea, abdominal pain. 10 point review of systems is otherwise negative with the exception of the elements mentioned above in the HPI. No Known Allergies Past Medical History:  
Diagnosis Date  AML (acute myeloid leukemia) ([facility] dx- 4/2019  
 followed by dr Winn Beverage  Depression  Hypercholesterolemia  Infection   
 of port -- was placed 5/2019-- removed 6/2019---right chest  
 Psychiatric disorder   
 aniexty  Sleep apnea Past Surgical History:  
Procedure Laterality Date  HX OTHER SURGICAL    
 colonoscopy  HX VASCULAR ACCESS    
 IR INSERT TUNL CVC W PORT OVER 5 YEARS  4/30/2019  IR INSERT TUNL CVC W PORT OVER 5 YEARS  7/15/2019  IR REMOVE TUNL CVAD W PORT/PUMP  6/13/2019 Family History Problem Relation Age of Onset  Cancer Father Social History Socioeconomic History  Marital status:  Spouse name: Not on file  Number of children: Not on file  Years of education: Not on file  Highest education level: Not on file Occupational History  Not on file Social Needs  Financial resource strain: Not on file  Food insecurity:  
  Worry: Not on file Inability: Not on file  Transportation needs:  
  Medical: Not on file Non-medical: Not on file Tobacco Use  Smoking status: Never Smoker  Smokeless tobacco: Never Used Substance and Sexual Activity  Alcohol use: Never Frequency: Never  Drug use: Never  Sexual activity: Not on file Lifestyle  Physical activity:  
  Days per week: Not on file Minutes per session: Not on file  Stress: Not on file Relationships  Social connections:  
  Talks on phone: Not on file Gets together: Not on file Attends Mosque service: Not on file Active member of club or organization: Not on file Attends meetings of clubs or organizations: Not on file Relationship status: Not on file  Intimate partner violence:  
  Fear of current or ex partner: Not on file Emotionally abused: Not on file Physically abused: Not on file Forced sexual activity: Not on file Other Topics Concern [address] Service Not Asked  Blood Transfusions Not Asked  Caffeine Concern Not Asked  Occupational Exposure Not Asked Julia Mulch Hazards Not Asked  Sleep Concern Not Asked  Stress Concern Not Asked  Weight Concern Not Asked  Special Diet Not Asked  Back Care Not Asked  Exercise Not Asked  Bike Helmet Not Asked  Seat Belt Not Asked  Self-Exams Not Asked Social History Narrative  Not on file Current Facility-Administered Medications Medication Dose Route Frequency Provider Last Rate Last Dose  potassium chloride 40 mEq IVPB  40 mEq IntraVENous ONCE Sarah Crow MD 25 mL/hr at 11/21/19 1140 40 mEq at 11/21/19 1140  
 sodium chloride 0.9 % bolus infusion 500 mL  500 mL IntraVENous QHS Marcus Varner NP      
 potassium chloride (K-DUR, KLOR-CON) SR tablet 20 mEq  20 mEq Oral BID Sarah Crow MD   20 mEq at 11/21/19 0837  
 vancomycin (VANCOCIN) 1250 mg in  ml infusion  1,250 mg IntraVENous Q12H Sarah Crow .7 mL/hr at 11/20/19 2154 1,250 mg at 11/20/19 2154  albuterol (PROVENTIL VENTOLIN) nebulizer solution 2.5 mg  2.5 mg Nebulization Q6H PRN Christine Jay NP      
 0.9% sodium chloride infusion 250 mL  250 mL IntraVENous PRN Sarah Crow MD 15 mL/hr at 11/20/19 0604 250 mL at 11/20/19 2184  potassium, sodium phosphates (NEUTRA-PHOS) packet 1 Packet  1 Packet Oral TID WITH MEALS Leslie Iha, NP   1 Packet at 11/21/19 0836  
 alum-mag hydroxide-simeth (MYLANTA) oral suspension 30 mL  30 mL Oral Q4H PRN Willy Alston MD   30 mL at 11/21/19 8671  cefepime (MAXIPIME) 2 g in 0.9% sodium chloride (MBP/ADV) 100 mL  2 g IntraVENous Q8H Willy Alston  mL/hr at 11/21/19 0836 2 g at 11/21/19 0836  
 0.9% sodium chloride infusion 250 mL  250 mL IntraVENous PRN Leslie Iha, NP      
 loperamide (IMODIUM) capsule 2 mg  2 mg Oral Q4H PRN Willy Alston MD   2 mg at 11/19/19 2125  loratadine (CLARITIN) tablet 10 mg  10 mg Oral DAILY Laurie Maxin, NP   10 mg at 11/21/19 0837  
 central line flush (saline) syringe 10 mL  10 mL InterCATHeter PRN Willy Alston MD   10 mL at 11/18/19 2110  
 docusate sodium (COLACE) capsule 100 mg  100 mg Oral BID PRN Leslie Iha, NP   100 mg at 11/11/19 1308  LORazepam (ATIVAN) injection 0.5 mg  0.5 mg IntraVENous Q6H PRN Willy Alston MD   0.5 mg at 11/17/19 0546  
 saline peripheral flush soln 10 mL  10 mL InterCATHeter PRN Willy Alston MD   10 mL at 11/13/19 2154  heparin (porcine) pf 300-500 Units  300-500 Units InterCATHeter PRN Willy Alston MD      
 tbo-filgrastim [facility]) injection 480 mcg  480 mcg SubCUTAneous QHS Willy Alston MD   480 mcg at 11/20/19 2146  acyclovir (ZOVIRAX) tablet 400 mg  400 mg Oral BID Leslie Iha, NP   400 mg at 11/21/19 0837  
 allopurinol (ZYLOPRIM) tablet 300 mg  300 mg Oral DAILY Leslie Iha, NP   300 mg at 11/21/19 7190  cholecalciferol (VITAMIN D3) (400 Units /10 mcg) tablet 2 Tab  800 Units Oral DAILY Leslie Iha, NP   2 Tab at 11/21/19 9605  DULoxetine (CYMBALTA) capsule 30 mg  30 mg Oral DAILY Leslie Iha, NP   30 mg at 11/21/19 0224  lidocaine-prilocaine (EMLA) 2.5-2.5 % cream   Topical PRN Leslie Iha, NP      
 LORazepam (ATIVAN) tablet 1 mg  1 mg Oral QHS Leslie Iha, NP   1 mg at 11/20/19 5968  pravastatin (PRAVACHOL) tablet 10 mg  10 mg Oral QHS Abbey Bacca, NP   10 mg at 19 2146  voriconazole (VFEND) tablet 200 mg  200 mg Oral Q12H Abbey Bacca, NP   200 mg at 19 0837  
 ondansetron (ZOFRAN) injection 4 mg  4 mg IntraVENous Q4H PRN Abbey Bacca, NP   4 mg at 19 1455  prochlorperazine (COMPAZINE) with saline injection 5 mg  5 mg IntraVENous Q6H PRN Abbey Bacca, NP   5 mg at 19 1632  
 HYDROcodone-acetaminophen (NORCO) 5-325 mg per tablet 1 Tab  1 Tab Oral Q6H PRN Abbey Bacca, NP   1 Tab at 19 7975  morphine injection 2 mg  2 mg IntraVENous Q4H PRN Abbey Bacca, NP      
 acetaminophen (TYLENOL) tablet 650 mg  650 mg Oral Q6H PRN Leandra Chandra MD   650 mg at 19 2019  diphenhydrAMINE (BENADRYL) capsule 25 mg  25 mg Oral Q6H PRN Leandra Chandra MD   25 mg at 19 2145  acetaminophen/diphenhydrAMINE (TYLENOL PM EXT STR) 500/25 mg (Patient Supplied)   Oral QHS Leandra Chandra MD   Stopped at 19 2200  
 vit C,R-Fc-bmmlu-lutein-zeaxan (PRESERVISION AREDS-2) capsule 1 Cap (Patient Supplied)  [address] Leandra Chandra MD   1 Cap at 19 1024 OBJECTIVE: 
Patient Vitals for the past 8 hrs: 
 BP Temp Pulse Resp SpO2  
19 0752 148/75 99.9 °F (37.7 °C) 93 16 97 % Temp (24hrs), Av.8 °F (37.7 °C), Min:97.8 °F (36.6 °C), Max:101.5 °F (38.6 °C) 
 
701 -  1900 In: 240 [P.O.:240] Out: - Physical Exam: 
Constitutional: Well developed, well nourished female in no acute distress, sitting on the edge of bed. HEENT: Normocephalic and atraumatic. Oropharynx is clear, mucous membranes are moist. Extraocular muscles are intact. Sclerae anicteric. Neck supple without JVD. No thyromegaly present. Skin Warm and dry. No rash noted. No erythema. No pallor. Bruising noted on BUE/R elbow and abdomen.    
Respiratory Lungs are clear to auscultation bilaterally without wheezes, rales or rhonchi, normal air exchange without accessory muscle use. CVS Normal rate, regular rhythm and normal S1 and S2. No murmurs, gallops, or rubs. Abdomen Soft, mildly tender across lower abd-improving, nondistended, normoactive bowel sounds. No palpable mass. No hepatosplenomegaly. Neuro Grossly nonfocal with no obvious sensory or motor deficits. MSK Normal range of motion in general.  No edema and no tenderness. Psych Appropriate mood and affect. Labs:   
Recent Results (from the past 24 hour(s)) CULTURE, BLOOD Collection Time: 11/20/19  8:28 PM  
Result Value Ref Range Special Requests: PORT Culture result: NO GROWTH AFTER 9 HOURS METABOLIC PANEL, COMPREHENSIVE Collection Time: 11/21/19  3:39 AM  
Result Value Ref Range Sodium 145 136 - 145 mmol/L Potassium 3.0 (L) 3.5 - 5.1 mmol/L Chloride 115 (H) 98 - 107 mmol/L  
 CO2 24 21 - 32 mmol/L Anion gap 6 (L) 7 - 16 mmol/L Glucose 91 65 - 100 mg/dL BUN 8 8 - 23 MG/DL Creatinine 0.53 (L) 0.6 - 1.0 MG/DL  
 GFR est AA >60 >60 ml/min/1.73m2 GFR est non-AA >60 >60 ml/min/1.73m2 Calcium 7.2 (L) 8.3 - 10.4 MG/DL Bilirubin, total 0.3 0.2 - 1.1 MG/DL  
 ALT (SGPT) 23 12 - 65 U/L  
 AST (SGOT) 13 (L) 15 - 37 U/L Alk. phosphatase 71 50 - 136 U/L Protein, total 5.4 (L) 6.3 - 8.2 g/dL Albumin 1.8 (L) 3.2 - 4.6 g/dL Globulin 3.6 (H) 2.3 - 3.5 g/dL A-G Ratio 0.5 (L) 1.2 - 3.5 MAGNESIUM Collection Time: 11/21/19  3:39 AM  
Result Value Ref Range Magnesium 1.8 1.8 - 2.4 mg/dL LD Collection Time: 11/21/19  3:39 AM  
Result Value Ref Range  (H) 110 - 210 U/L  
URIC ACID Collection Time: 11/21/19  3:39 AM  
Result Value Ref Range Uric acid 1.8 (L) 2.6 - 6.0 MG/DL  
PHOSPHORUS Collection Time: 11/21/19  3:39 AM  
Result Value Ref Range Phosphorus 2.3 2.3 - 3.7 MG/DL  
CBC WITH AUTOMATED DIFF Collection Time: 11/21/19  3:39 AM  
Result Value Ref Range WBC 0.0 (LL) 4.3 - 11.1 K/uL  
 RBC 2.57 (L) 4.05 - 5.2 M/uL HGB 7.6 (L) 11.7 - 15.4 g/dL HCT 22.5 (L) 35.8 - 46.3 % MCV 87.5 79.6 - 97.8 FL  
 MCH 29.6 26.1 - 32.9 PG  
 MCHC 33.8 31.4 - 35.0 g/dL  
 RDW 14.0 11.9 - 14.6 % PLATELET 47 (L) 431 - 450 K/uL MPV 10.7 9.4 - 12.3 FL ABSOLUTE NRBC 0.00 0.0 - 0.2 K/uL  
 DF AUTOMATED NEUTROPHILS 100 (H) 43 - 78 % LYMPHOCYTES 0 (L) 13 - 44 % MONOCYTES 0 (L) 4.0 - 12.0 % EOSINOPHILS 0 (L) 0.5 - 7.8 % BASOPHILS 0 0.0 - 2.0 % IMMATURE GRANULOCYTES 0 0.0 - 5.0 %  
 ABS. NEUTROPHILS 0.0 (L) 1.7 - 8.2 K/UL  
 ABS. LYMPHOCYTES 0.0 (L) 0.5 - 4.6 K/UL  
 ABS. MONOCYTES 0.0 (L) 0.1 - 1.3 K/UL  
 ABS. EOSINOPHILS 0.0 0.0 - 0.8 K/UL  
 ABS. BASOPHILS 0.0 0.0 - 0.2 K/UL  
 ABS. IMM. GRANS. 0.0 0.0 - 0.5 K/UL Imaging: 
CT HEAD WO CONT [[id number]] Collected: 11/19/19 1050 Order Status: Completed Updated: 11/19/19 1054 Narrative:    
CT HEAD WITHOUT CONTRAST, 11/19/2019 History: Fall last night hitting left side of head Comparison: . Technique:   5 mm axial scans from the skull base to the vertex. All CT scans 
performed at this facility use one or all of the following: Automated exposure 
control, adjustment of the mA and/or kVp according to patient's size, iterative 
reconstruction. Findings:  No evidence of intracranial hemorrhage is seen. Faint bilateral basal 
ganglia calcifications are seen. No abnormal extra-axial fluid collections are 
seen.  Mild cortical involutional changes are seen which are not felt to be 
abnormal given the patient's age. Rubina Shingles evidence for acute hydrocephalus is seen. No evidence of midline shift or herniation is seen.  No abnormal edema pattern 
is seen in a vascular distribution to suggest large artery infarction. Evaluation with bone windows shows no acute osseous changes.  The visualized 
sinuses, middle ears, and mastoid air cells are well aerated.   
Impression:    
IMPRESSION:   
 1.  No acute intracranial process evident by noncontrast CT study of the head. XR CHEST SNGL V [[id number]] Collected: 11/18/19 2041 Order Status: Completed Updated: 11/18/19 2044 Narrative:    
EXAM: XR CHEST SNGL V 
 
INDICATION: neutropenic fever COMPARISON: 9/29/2019 FINDINGS: A portable AP radiograph of the chest was obtained at 1946 hours. The 
patient is on a cardiac monitor. The lungs are clear. The cardiac and 
mediastinal contours and pulmonary vascularity are normal.  The bones and soft 
tissues are grossly within normal limits. Impression:    
IMPRESSION: Normal chest.  
XR ELBOW RT MIN 3 V [[id number]] Collected: 11/10/19 1421 Order Status: Completed Updated: 11/10/19 1424 Narrative:    
Right elbow Clinical location: Elbow pain after feeling a pop, decreased range of motion FINDINGS: Four views of the right elbow show no fracture or dislocation. There 
is no joint effusion. The soft tissues are unremarkable. Impression:    
IMPRESSION: No acute osseous abnormality or joint derangement of the right 
elbow. ASSESSMENT: 
Problem List  Date Reviewed: 10/31/2019 Codes Class Noted Febrile neutropenia (HCC) ICD-10-CM: D70.9, R50.81 ICD-9-CM: 288.00, 780.61  9/21/2019 Pancytopenia due to antineoplastic chemotherapy [facility]) ICD-10-CM: D61.810, T45.1X5A 
ICD-9-CM: 284.11, E933.1  6/12/2019 Cellulitis of neck ICD-10-CM: V13.846 ICD-9-CM: 682.1  6/12/2019 Immunocompromised status associated with infection ICD-10-CM: B99.9 ICD-9-CM: 136.9  6/12/2019 Port or reservoir infection ICD-10-CM: O18.746E ICD-9-CM: 999.33  6/12/2019 Acute myeloid leukemia not having achieved remission ([facility] ICD-10-CM: C92.00 ICD-9-CM: 205.00  5/9/2019 Admission for antineoplastic chemotherapy ICD-10-CM: Z51.11 ICD-9-CM: V58.11  5/5/2019 AML (acute myeloblastic [facility] ICD-10-CM: C92.00 ICD-9-CM: 205.00  4/28/2019 Weakness generalized ICD-10-CM: R53.1 ICD-9-CM: 780.79  4/28/2019 Pancytopenia ([facility] ICD-10-CM: K63.274 ICD-9-CM: 284.19  4/28/2019 Thrombocytopenia ([facility] ICD-10-CM: D69.6 ICD-9-CM: 287.5  4/27/2019 Ms. Mely Houston is a 68 y.o. female admitted on 11/7/2019. She is a known patient of Dr. 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 She failed induction with 7+3/Midostaurin. On Dacogen/Gilteritinib with recent BMbx with persistent residual disease. She is being admitted for FLAG-VENITA. She is feeling well and is ready to proceed with treatment. PLAN: 
AML 
- admit for salvage FLAG-VENITA 
- needs Echo prior - ordered 11/8 Day 1 FLAG-VENITA. Echo with EF 55-60%, proceed with tx. 
11/9 Day 2 FLAG-VENITA. Tolerating well, no issues. Uric acid 3.1 today. 11/10 Day 3 FLAG-VENITA. No issues with treatment. Uric acid 3.3 today. 11/12 Day 5 FLAG-VENITA. Tolerating treatment well. G-CSF to start tomorrow. 11/13 Day 6 FLAG-VENITA, doing well, Granix/claritin starts today 11/19 Day 12 FLAG-VENITA. Awaiting count recovery, con't Granix. 11/20 Day 13 FLAG-VENITA. WBC 0. Continue Granix. Pancytopenia secondary to disease - Transfuse prn per [facility] SOPs 
11/19 Transfuse PRBCs today R elbow pain 11/11 c/o R elbow pain with limited ROM yesterday. Xray neg. Pain improved today, noted bruising. Normal ROM on exam. 
 
Mild Abd discomfort/loose stools 11/13 discomfort is improved from yesterday, will monitor 11/14 loose stools, but not watery, can use Imodium prn 
11/15 Imodium working well  
11/16 controlled Neutropenic fever / UTI 
11/19 Tmax 102. 1.  UA +UTI. UCx pending. BCx-NGTD. CXR neg. On Cef/Vanc. DC prophylactic LVQ. 11/20 Tmax 102. BCx positive for streptococcus/enterococcus. Subculture in progress. On Cef/Vanc. Sepsis not present on admission 11/21  BCx positive for streptococcus/enterococcus. Subculture in progress. Fall 11/19 s/p last PM.  No LOC. Hit head. CT head neg. Double vision 11/21 Neuro has seen patient. Concerns for possible 6th nerve palsy. Recommends LP. We will hold with WBC 0.0 and continue to monitor. Continue home meds Prophylactic Antibx: Acyclovir, Voriconazole [facility] SOPs SCDs for DVT prophylaxis (AC contraindicated d/t thrombocytopenia) Goals and plan of care reviewed with the patient. All questions answered to the best of our ability. Disposition:  Awaiting count recovery. Will need BMbx prior to discharge but not prior to day 28 chemo. Upon discharge will need twice weekly labs w transfusions as needed. Weekly provider visits. RTC within 1 week from discharge. Madilyn Buerger, NP [facility] Hematology & Oncology [address] [place] [address] Office : [phone] Fax : [phone] Attending Addendum: 
I have personally performed a face to face diagnostic evaluation on this patient. I have reviewed and agree with the care plan as documented by Madilyn Buerger, N.P. 36 minutes were spent on patient care, including but not limited to, reviewing the chart and time with the patient and family, more than 50% of the time documented was spent in face-to-face contact with the patient and in the care of the patient on the floor/unit where the patient is located. My findings are as follows: She has AML and received FLAG-VENITA chemotherapy after which she developed febrile neutropenia in the hospital and was septic due to alpha-strep that grew in her cultures, this was unlikely to be caused by her venous access lines, appears weak, heart rate regular without murmurs, abdomen is non-tender, bowel sounds are positive, we will continue IV ABX and transfuse as per protocol. Joel Genao MD 
 
 
[facility] Hematology/Oncology [id number] [place] [address] Office : [phone] Fax : [phone]

## 2019-11-21 NOTE — CDMP QUERY
Patient admitted for chemotherapy for AML. Patient noted to have \"alpha step sepsis\" documented in a progress note. If possible, please document in the progress notes and d/c summary if you are evaluating and / or treating any of the following: 
 
? Sepsis, present on admission, suspected or probable causative organism (please specify) ? Sepsis, now resolved, suspected or probable causative organism (please specify) ? Sepsis, not present on admission, suspected or probable causative organism (please specify) ? No Sepsis, suspected or probable localized infection (please specify) ? Sepsis was ruled out (include corresponding diagnosis for patients clinical picture and treatment) ? Bacteremia 
? Other, please specify ? Clinically unable to determine The medical record reflects the following: 
   Risk Factors: UTI, Immunocompromised on chemotherapy Clinical Indicators: T up to 102.1, , WBC 0, BC with alpha strep Treatment: Bárbara Mccormick, IVF Mason, Myesha Monzon, RN, BSN, CDS Clinical Documentation Improvement 
[phone]

## 2019-11-21 NOTE — PROGRESS NOTES
Nutrition follow-up Reason for initial assessment: Length of Stay  
  
Assessment:  
Food/Nutrition Patient History:  Patient with PMH of AML and hypercholesterolemia admitted for chemo. She is day14 FLAG-VENITA. On Granix, awaiting count recovery. She was seen today with spouse at bedside. She states that her appetite has been declining even since last RD assessment. She states that while she was able to continue to eat despite poor PO previously, she has had increased difficulty with PO recently. She is agreeable to starting Ensure. 
  
DIET REGULAR   
  
Anthropometrics:Height: 5' 6\" (167.6 cm),  Weight: 87.1 kg (192 lb), Weight Source: Standing scale (comment), Body mass index is 30.99 kg/m². BMI class of obese Macronutrient needs: 87.1 kg Listed body weight EER:  9319-3072 kcal /day (18-20 kcal/kg) EPR:  78-87 grams protein/day (20% kcal) 
  Intake/Comparative Standards: Recorded meal(s): 60% of 9 recorded meals since last RD assessment. This potentially meets ~85% of kcal and ~70% of protein needs. Noted decline since last assessment and previous admissions. 
  
Nutrition Diagnosis:  
Inadequate oral intake related to poor appetite as evidenced by patient reported barrier to PO intake, decline in intake meeting ~85% estimated energy needs and ~70% of estimated protein needs. 
  
Intervention: 
Meals and snacks: Continue current diet. Will add yogurt to all trays per patient request. 
Nutrition Supplement Therapy: Add Ensure Enlive BID Coordination of Nutrition Care: Jesica Dickerson Discharge Nutrition Plan: Too soon to determine. 150 [facility] [address] [place] [address], [address], [address] [facility]

## 2019-11-21 NOTE — PROGRESS NOTES
Pt in bed alert and oriented x 3 resp even and labored with any movement, audible wheezes are present. Pt has temp of 101.5 and tylenol was given. Pt complains of double vision. Pt states \" I don't see double if its just one person, but when there and two people present I see 4. \" the NP on call has been notified

## 2019-11-21 NOTE — PROGRESS NOTES
0700-Bedside report received from [facility]. Resting in bed. No needs voiced. No s/s of acute distress. 1205-C/o stomach cramping. Discussed with Danielle Larsen NP. Baclofen ordered. 1455-After doing IS the majority of the day, breath sounds have gone from crackles to more rhonchi. Pt reports uses albuterol at home. D/W Danielle Larsen NP. Ok to order albuterol nebs. 1855-Stool soft and semi-formed. Would not meet C. Diff criteria.

## 2019-11-21 NOTE — PROGRESS NOTES
Problem: Falls - Risk of 
Goal: *Absence of Falls Outcome: Progressing Towards Goal 
Note: Fall Risk Interventions: 
Mobility Interventions: Communicate number of staff needed for ambulation/transfer, OT consult for ADLs, Patient to call before getting OOB, PT Consult for mobility concerns Medication Interventions: Evaluate medications/consider consulting pharmacy, Patient to call before getting OOB, Teach patient to arise slowly Elimination Interventions: Call light in reach History of Falls Interventions: Consult care management for discharge planning, Door open when patient unattended, Evaluate medications/consider consulting pharmacy, Investigate reason for fall, Room close to nurse's station

## 2019-11-22 LAB
ALBUMIN SERPL-MCNC: 1.7 G/DL (ref 3.2–4.6)
ALBUMIN/GLOB SERPL: 0.5 {RATIO} (ref 1.2–3.5)
ALP SERPL-CCNC: 71 U/L (ref 50–136)
ALT SERPL-CCNC: 28 U/L (ref 12–65)
ANION GAP SERPL CALC-SCNC: 6 MMOL/L (ref 7–16)
AST SERPL-CCNC: 16 U/L (ref 15–37)
BACTERIA SPEC CULT: ABNORMAL
BILIRUB SERPL-MCNC: 0.4 MG/DL (ref 0.2–1.1)
BUN SERPL-MCNC: 6 MG/DL (ref 8–23)
CALCIUM SERPL-MCNC: 7.2 MG/DL (ref 8.3–10.4)
CHLORIDE SERPL-SCNC: 113 MMOL/L (ref 98–107)
CO2 SERPL-SCNC: 27 MMOL/L (ref 21–32)
CREAT SERPL-MCNC: 0.5 MG/DL (ref 0.6–1)
DIFFERENTIAL METHOD BLD: ABNORMAL
ERYTHROCYTE [DISTWIDTH] IN BLOOD BY AUTOMATED COUNT: 14.1 % (ref 11.9–14.6)
GLOBULIN SER CALC-MCNC: 3.7 G/DL (ref 2.3–3.5)
GLUCOSE SERPL-MCNC: 87 MG/DL (ref 65–100)
GRAM STN SPEC: ABNORMAL
HCT VFR BLD AUTO: 22.2 % (ref 35.8–46.3)
HGB BLD-MCNC: 7.5 G/DL (ref 11.7–15.4)
LDH SERPL L TO P-CCNC: 261 U/L (ref 110–210)
MAGNESIUM SERPL-MCNC: 1.7 MG/DL (ref 1.8–2.4)
MCH RBC QN AUTO: 29.8 PG (ref 26.1–32.9)
MCHC RBC AUTO-ENTMCNC: 33.8 G/DL (ref 31.4–35)
MCV RBC AUTO: 88.1 FL (ref 79.6–97.8)
NRBC # BLD: 0 K/UL (ref 0–0.2)
PHOSPHATE SERPL-MCNC: 2.3 MG/DL (ref 2.3–3.7)
PLATELET # BLD AUTO: 25 K/UL (ref 150–450)
PLATELET COMMENTS,PCOM: ABNORMAL
PMV BLD AUTO: 10.3 FL (ref 9.4–12.3)
POTASSIUM SERPL-SCNC: 3.3 MMOL/L (ref 3.5–5.1)
PROT SERPL-MCNC: 5.4 G/DL (ref 6.3–8.2)
RBC # BLD AUTO: 2.52 M/UL (ref 4.05–5.2)
RBC MORPH BLD: ABNORMAL
SERVICE CMNT-IMP: ABNORMAL
SODIUM SERPL-SCNC: 146 MMOL/L (ref 136–145)
URATE SERPL-MCNC: 1.6 MG/DL (ref 2.6–6)
VANCOMYCIN TROUGH SERPL-MCNC: 12 UG/ML (ref 5–20)
WBC # BLD AUTO: 0 K/UL (ref 4.3–11.1)
WBC MORPH BLD: ABNORMAL

## 2019-11-22 PROCEDURE — 65270000029 HC RM PRIVATE

## 2019-11-22 PROCEDURE — 83615 LACTATE (LD) (LDH) ENZYME: CPT

## 2019-11-22 PROCEDURE — 84100 ASSAY OF PHOSPHORUS: CPT

## 2019-11-22 PROCEDURE — 74011250636 HC RX REV CODE- 250/636: Performed by: INTERNAL MEDICINE

## 2019-11-22 PROCEDURE — 83735 ASSAY OF MAGNESIUM: CPT

## 2019-11-22 PROCEDURE — 74011000258 HC RX REV CODE- 258: Performed by: INTERNAL MEDICINE

## 2019-11-22 PROCEDURE — 74011250637 HC RX REV CODE- 250/637: Performed by: NURSE PRACTITIONER

## 2019-11-22 PROCEDURE — 80202 ASSAY OF VANCOMYCIN: CPT

## 2019-11-22 PROCEDURE — 74011250636 HC RX REV CODE- 250/636: Performed by: NURSE PRACTITIONER

## 2019-11-22 PROCEDURE — 36591 DRAW BLOOD OFF VENOUS DEVICE: CPT

## 2019-11-22 PROCEDURE — 99231 SBSQ HOSP IP/OBS SF/LOW 25: CPT | Performed by: NURSE PRACTITIONER

## 2019-11-22 PROCEDURE — 87045 FECES CULTURE AEROBIC BACT: CPT

## 2019-11-22 PROCEDURE — 85025 COMPLETE CBC W/AUTO DIFF WBC: CPT

## 2019-11-22 PROCEDURE — 77010033678 HC OXYGEN DAILY

## 2019-11-22 PROCEDURE — 84550 ASSAY OF BLOOD/URIC ACID: CPT

## 2019-11-22 PROCEDURE — 65270000015 HC RM PRIVATE ONCOLOGY

## 2019-11-22 PROCEDURE — 87177 OVA AND PARASITES SMEARS: CPT

## 2019-11-22 PROCEDURE — 77030020263 HC SOL INJ SOD CL0.9% LFCR 1000ML

## 2019-11-22 PROCEDURE — 99233 SBSQ HOSP IP/OBS HIGH 50: CPT | Performed by: INTERNAL MEDICINE

## 2019-11-22 PROCEDURE — 80053 COMPREHEN METABOLIC PANEL: CPT

## 2019-11-22 PROCEDURE — 74011250637 HC RX REV CODE- 250/637: Performed by: INTERNAL MEDICINE

## 2019-11-22 PROCEDURE — 94760 N-INVAS EAR/PLS OXIMETRY 1: CPT

## 2019-11-22 RX ORDER — POTASSIUM CHLORIDE 29.8 MG/ML
40 INJECTION INTRAVENOUS ONCE
Status: COMPLETED | OUTPATIENT
Start: 2019-11-22 | End: 2019-11-22

## 2019-11-22 RX ORDER — VANCOMYCIN 1.75 GRAM/500 ML IN 0.9 % SODIUM CHLORIDE INTRAVENOUS
1750 EVERY 12 HOURS
Status: DISCONTINUED | OUTPATIENT
Start: 2019-11-22 | End: 2019-11-25

## 2019-11-22 RX ORDER — MAGNESIUM SULFATE HEPTAHYDRATE 40 MG/ML
2 INJECTION, SOLUTION INTRAVENOUS ONCE
Status: COMPLETED | OUTPATIENT
Start: 2019-11-22 | End: 2019-11-22

## 2019-11-22 RX ADMIN — MAGNESIUM SULFATE HEPTAHYDRATE 2 G: 40 INJECTION, SOLUTION INTRAVENOUS at 10:12

## 2019-11-22 RX ADMIN — VANCOMYCIN HYDROCHLORIDE 1750 MG: 10 INJECTION, POWDER, LYOPHILIZED, FOR SOLUTION INTRAVENOUS at 14:00

## 2019-11-22 RX ADMIN — POTASSIUM CHLORIDE 20 MEQ: 20 TABLET, EXTENDED RELEASE ORAL at 16:18

## 2019-11-22 RX ADMIN — VORICONAZOLE 200 MG: 200 TABLET, FILM COATED ORAL at 10:10

## 2019-11-22 RX ADMIN — CEFEPIME 2 G: 2 INJECTION, POWDER, FOR SOLUTION INTRAVENOUS at 10:12

## 2019-11-22 RX ADMIN — LOPERAMIDE HYDROCHLORIDE 2 MG: 2 CAPSULE ORAL at 12:00

## 2019-11-22 RX ADMIN — ALLOPURINOL 300 MG: 300 TABLET ORAL at 10:10

## 2019-11-22 RX ADMIN — ACETAMINOPHEN 650 MG: 325 TABLET, FILM COATED ORAL at 20:06

## 2019-11-22 RX ADMIN — TBO-FILGRASTIM 480 MCG: 480 INJECTION, SOLUTION SUBCUTANEOUS at 21:54

## 2019-11-22 RX ADMIN — ACYCLOVIR 400 MG: 800 TABLET ORAL at 16:10

## 2019-11-22 RX ADMIN — ACYCLOVIR 400 MG: 800 TABLET ORAL at 10:10

## 2019-11-22 RX ADMIN — DIPHENHYDRAMINE HYDROCHLORIDE 25 MG: 25 CAPSULE ORAL at 21:54

## 2019-11-22 RX ADMIN — CEFEPIME 2 G: 2 INJECTION, POWDER, FOR SOLUTION INTRAVENOUS at 01:05

## 2019-11-22 RX ADMIN — CEFEPIME 2 G: 2 INJECTION, POWDER, FOR SOLUTION INTRAVENOUS at 16:11

## 2019-11-22 RX ADMIN — LORAZEPAM 1 MG: 1 TABLET ORAL at 21:53

## 2019-11-22 RX ADMIN — Medication 2 TABLET: at 16:10

## 2019-11-22 RX ADMIN — POTASSIUM CHLORIDE 20 MEQ: 20 TABLET, EXTENDED RELEASE ORAL at 10:10

## 2019-11-22 RX ADMIN — POTASSIUM CHLORIDE 40 MEQ: 400 INJECTION, SOLUTION INTRAVENOUS at 10:11

## 2019-11-22 RX ADMIN — VORICONAZOLE 200 MG: 200 TABLET, FILM COATED ORAL at 21:54

## 2019-11-22 RX ADMIN — DULOXETINE 30 MG: 30 CAPSULE, DELAYED RELEASE ORAL at 10:10

## 2019-11-22 RX ADMIN — PRAVASTATIN SODIUM 10 MG: 20 TABLET ORAL at 21:54

## 2019-11-22 RX ADMIN — LORATADINE 10 MG: 10 TABLET ORAL at 10:10

## 2019-11-22 NOTE — PROGRESS NOTES
[employer] [facility] Inpatient Hematology / Oncology Daily Progress Note Reason for Admission:  Admission for antineoplastic chemotherapy [Z51.11] 24 Hour Events: 
Weak and fatigued BCx- + enterococcus/streptococcus Repeat BC NTD Day 15 FLAG-VENITA Awaiting count recovery, on Granix WBC 0 
 
 
ROS: 
Constitutional: +fatigue -fever CV: Negative for chest pain, palpitations, edema. Respiratory: Negative for dyspnea, cough, wheezing. GI: Negative for nausea, abdominal pain. 10 point review of systems is otherwise negative with the exception of the elements mentioned above in the HPI. No Known Allergies Past Medical History:  
Diagnosis Date  AML (acute myeloid leukemia) ([facility] [address] dx- 4/2019  
 followed by dr Dianne Real  Depression  Hypercholesterolemia  Infection   
 of port -- was placed 5/2019-- removed 6/2019---right chest  
 Psychiatric disorder   
 aniexty  Sleep apnea Past Surgical History:  
Procedure Laterality Date  HX OTHER SURGICAL    
 colonoscopy  HX VASCULAR ACCESS    
 IR INSERT TUNL CVC W PORT OVER 5 YEARS  4/30/2019  IR INSERT TUNL CVC W PORT OVER 5 YEARS  7/15/2019  IR REMOVE TUNL CVAD W PORT/PUMP  6/13/2019 Family History Problem Relation Age of Onset  Cancer Father Social History Socioeconomic History  Marital status:  Spouse name: Not on file  Number of children: Not on file  Years of education: Not on file  Highest education level: Not on file Occupational History  Not on file Social Needs  Financial resource strain: Not on file  Food insecurity:  
  Worry: Not on file Inability: Not on file  Transportation needs:  
  Medical: Not on file Non-medical: Not on file Tobacco Use  Smoking status: Never Smoker  Smokeless tobacco: Never Used Substance and Sexual Activity  Alcohol use: Never Frequency: Never  Drug use: Never  Sexual activity: Not on file Lifestyle  Physical activity:  
  Days per week: Not on file Minutes per session: Not on file  Stress: Not on file Relationships  Social connections:  
  Talks on phone: Not on file Gets together: Not on file Attends Oriental orthodox service: Not on file Active member of club or organization: Not on file Attends meetings of clubs or organizations: Not on file Relationship status: Not on file  Intimate partner violence:  
  Fear of current or ex partner: Not on file Emotionally abused: Not on file Physically abused: Not on file Forced sexual activity: Not on file Other Topics Concern [address] Service Not Asked  Blood Transfusions Not Asked  Caffeine Concern Not Asked  Occupational Exposure Not Asked Michail Marty Hazards Not Asked  Sleep Concern Not Asked  Stress Concern Not Asked  Weight Concern Not Asked  Special Diet Not Asked  Back Care Not Asked  Exercise Not Asked  Bike Helmet Not Asked  Seat Belt Not Asked  Self-Exams Not Asked Social History Narrative  Not on file Current Facility-Administered Medications Medication Dose Route Frequency Provider Last Rate Last Dose  potassium chloride 40 mEq IVPB  40 mEq IntraVENous ONCE Mian Vega MD 25 mL/hr at 11/22/19 1011 40 mEq at 11/22/19 1011  
 sodium chloride 0.9 % bolus infusion 500 mL  500 mL IntraVENous QHS Mario Quiñonez NP   500 mL at 11/21/19 2315  baclofen (LIORESAL) tablet 5 mg  5 mg Oral Q8H PRN Mian Vega MD   5 mg at 11/21/19 1335  Vancomycin Trough Reminder   Other ONCE Mian Vega MD      
 potassium chloride (K-DUR, KLOR-CON) SR tablet 20 mEq  20 mEq Oral BID Mian Vega MD   20 mEq at 11/22/19 1010  
 vancomycin (VANCOCIN) 1250 mg in  ml infusion  1,250 mg IntraVENous Q12H Mian Vega .7 mL/hr at 11/21/19 2315 1,250 mg at 11/21/19 2315  albuterol (PROVENTIL VENTOLIN) nebulizer solution 2.5 mg  2.5 mg Nebulization Q6H PRN Christine Jay NP   2.5 mg at 11/21/19 1600  
 0.9% sodium chloride infusion 250 mL  250 mL IntraVENous PRN Monika eD Jesus MD 15 mL/hr at 11/20/19 0604 250 mL at 11/20/19 2748  alum-mag hydroxide-simeth (MYLANTA) oral suspension 30 mL  30 mL Oral Q4H PRN Monika De Jesus MD   30 mL at 11/21/19 8987  cefepime (MAXIPIME) 2 g in 0.9% sodium chloride (MBP/ADV) 100 mL  2 g IntraVENous Q8H Monika De Jesus  mL/hr at 11/22/19 1012 2 g at 11/22/19 1012  
 0.9% sodium chloride infusion 250 mL  250 mL IntraVENous PRN Rosa Elena Medeiros NP      
 loperamide (IMODIUM) capsule 2 mg  2 mg Oral Q4H PRN Monika De Jesus MD   2 mg at 11/19/19 2125  loratadine (CLARITIN) tablet 10 mg  10 mg Oral DAILY Mike Jimenez NP   10 mg at 11/22/19 1010  
 central line flush (saline) syringe 10 mL  10 mL InterCATHeter PRN Monika De Jesus MD   10 mL at 11/18/19 2110  
 docusate sodium (COLACE) capsule 100 mg  100 mg Oral BID PRN Rosa Elena Medeiros NP   100 mg at 11/11/19 1308  LORazepam (ATIVAN) injection 0.5 mg  0.5 mg IntraVENous Q6H PRN Monika De Jesus MD   0.5 mg at 11/17/19 0546  
 saline peripheral flush soln 10 mL  10 mL InterCATHeter PRN Monika De Jesus MD   10 mL at 11/13/19 2154  heparin (porcine) pf 300-500 Units  300-500 Units InterCATHeter PRN Monika De Jesus MD      
 tbo-filgrastim [facility]) injection 480 mcg  480 mcg SubCUTAneous QHS Monika De Jesus MD   480 mcg at 11/21/19 2315  acyclovir (ZOVIRAX) tablet 400 mg  400 mg Oral BID Rosa Elena Medeiros NP   400 mg at 11/22/19 1010  
 allopurinol (ZYLOPRIM) tablet 300 mg  300 mg Oral DAILY Rosa Elena Medeiros NP   300 mg at 11/22/19 1010  cholecalciferol (VITAMIN D3) (400 Units /10 mcg) tablet 2 Tab  800 Units Oral DAILY Rosa Elena Waldemar, NP   2 Tab at 11/21/19 2334  DULoxetine (CYMBALTA) capsule 30 mg  30 mg Oral DAILY Rosa Elena Waldemar, NP   30 mg at 11/22/19 1010  lidocaine-prilocaine (EMLA) 2.5-2.5 % cream   Topical PRN Dalia Abraham, NP      
 LORazepam (ATIVAN) tablet 1 mg  1 mg Oral QHS Dalia Abraham, NP   1 mg at 19 2314  pravastatin (PRAVACHOL) tablet 10 mg  10 mg Oral QHS Dalia Abraham, NP   10 mg at 19 2314  voriconazole (VFEND) tablet 200 mg  200 mg Oral Q12H Dalia Abraham, NP   200 mg at 19 1010  
 ondansetron (ZOFRAN) injection 4 mg  4 mg IntraVENous Q4H PRN Dalia Abraham, NP   4 mg at 19 1455  prochlorperazine (COMPAZINE) with saline injection 5 mg  5 mg IntraVENous Q6H PRN Dalia Abraham, NP   5 mg at 19 1632  
 HYDROcodone-acetaminophen (NORCO) 5-325 mg per tablet 1 Tab  1 Tab Oral Q6H PRN Dalia Abraham, NP   1 Tab at 19 6197  morphine injection 2 mg  2 mg IntraVENous Q4H PRN Dalia Abraham, NP      
 acetaminophen (TYLENOL) tablet 650 mg  650 mg Oral Q6H PRN Balyne Martin MD   650 mg at 19 2019  diphenhydrAMINE (BENADRYL) capsule 25 mg  25 mg Oral Q6H PRN Blayne Martin MD   25 mg at 19 2314  acetaminophen/diphenhydrAMINE (TYLENOL PM EXT STR) 500/25 mg (Patient Supplied)   Oral QHS Blayne Martin MD      
 vit C,Q-Yu-opvzg-lutein-zeaxan (PRESERVISION AREDS-2) capsule 1 Cap (Patient Supplied)  [address] Blayne Martin MD   1 Cap at 19 1011 OBJECTIVE: 
Patient Vitals for the past 8 hrs: 
 BP Temp Pulse Resp SpO2  
19 0809 141/63 99.9 °F (37.7 °C) 98 18 95 % 19 0505 138/77 98.6 °F (37 °C) 86 18 97 % Temp (24hrs), Av.5 °F (37.5 °C), Min:98.6 °F (37 °C), Max:99.9 °F (37.7 °C) No intake/output data recorded. Physical Exam: 
Constitutional: Well developed, well nourished female in no acute distress, sitting on the edge of bed. HEENT: Normocephalic and atraumatic. Oropharynx is clear, mucous membranes are moist. Extraocular muscles are intact. Sclerae anicteric. Neck supple without JVD. No thyromegaly present. Skin Warm and dry. No rash noted. No erythema. No pallor. Bruising noted on BUE/R elbow and abdomen. Respiratory Lungs are clear to auscultation bilaterally without wheezes, rales or rhonchi, normal air exchange without accessory muscle use. CVS Normal rate, regular rhythm and normal S1 and S2. No murmurs, gallops, or rubs. Abdomen Soft, mildly tender across lower abd-improving, nondistended, normoactive bowel sounds. No palpable mass. No hepatosplenomegaly. Neuro Grossly nonfocal with no obvious sensory or motor deficits. MSK Normal range of motion in general.  No edema and no tenderness. Psych Appropriate mood and affect. Labs:   
Recent Results (from the past 24 hour(s)) METABOLIC PANEL, COMPREHENSIVE Collection Time: 11/22/19  5:12 AM  
Result Value Ref Range Sodium 146 (H) 136 - 145 mmol/L Potassium 3.3 (L) 3.5 - 5.1 mmol/L Chloride 113 (H) 98 - 107 mmol/L  
 CO2 27 21 - 32 mmol/L Anion gap 6 (L) 7 - 16 mmol/L Glucose 87 65 - 100 mg/dL BUN 6 (L) 8 - 23 MG/DL Creatinine 0.50 (L) 0.6 - 1.0 MG/DL  
 GFR est AA >60 >60 ml/min/1.73m2 GFR est non-AA >60 >60 ml/min/1.73m2 Calcium 7.2 (L) 8.3 - 10.4 MG/DL Bilirubin, total 0.4 0.2 - 1.1 MG/DL  
 ALT (SGPT) 28 12 - 65 U/L  
 AST (SGOT) 16 15 - 37 U/L Alk. phosphatase 71 50 - 136 U/L Protein, total 5.4 (L) 6.3 - 8.2 g/dL Albumin 1.7 (L) 3.2 - 4.6 g/dL Globulin 3.7 (H) 2.3 - 3.5 g/dL A-G Ratio 0.5 (L) 1.2 - 3.5 MAGNESIUM Collection Time: 11/22/19  5:12 AM  
Result Value Ref Range Magnesium 1.7 (L) 1.8 - 2.4 mg/dL LD Collection Time: 11/22/19  5:12 AM  
Result Value Ref Range  (H) 110 - 210 U/L  
URIC ACID Collection Time: 11/22/19  5:12 AM  
Result Value Ref Range Uric acid 1.6 (L) 2.6 - 6.0 MG/DL  
PHOSPHORUS Collection Time: 11/22/19  5:12 AM  
Result Value Ref Range  Phosphorus 2.3 2.3 - 3.7 MG/DL  
CBC WITH AUTOMATED DIFF  
 Collection Time: 11/22/19  5:12 AM  
Result Value Ref Range WBC 0.0 (LL) 4.3 - 11.1 K/uL  
 RBC 2.52 (L) 4.05 - 5.2 M/uL HGB 7.5 (L) 11.7 - 15.4 g/dL HCT 22.2 (L) 35.8 - 46.3 % MCV 88.1 79.6 - 97.8 FL  
 MCH 29.8 26.1 - 32.9 PG  
 MCHC 33.8 31.4 - 35.0 g/dL  
 RDW 14.1 11.9 - 14.6 % PLATELET 25 (LL) 131 - 450 K/uL MPV 10.3 9.4 - 12.3 FL ABSOLUTE NRBC 0.00 0.0 - 0.2 K/uL  
 RBC COMMENTS NORMOCYTIC/NORMOCHROMIC    
 WBC COMMENTS WBC TOO FEW TO DIFFERENTIATE PLATELET COMMENTS MARKED    
 DF MANUAL Imaging: 
CT HEAD WO CONT [[id number]] Collected: 11/19/19 1050 Order Status: Completed Updated: 11/19/19 1054 Narrative:    
CT HEAD WITHOUT CONTRAST, 11/19/2019 History: Fall last night hitting left side of head Comparison: . Technique:   5 mm axial scans from the skull base to the vertex. All CT scans 
performed at this facility use one or all of the following: Automated exposure 
control, adjustment of the mA and/or kVp according to patient's size, iterative 
reconstruction. Findings:  No evidence of intracranial hemorrhage is seen. Faint bilateral basal 
ganglia calcifications are seen. No abnormal extra-axial fluid collections are 
seen.  Mild cortical involutional changes are seen which are not felt to be 
abnormal given the patient's age. Lethia Market evidence for acute hydrocephalus is seen. No evidence of midline shift or herniation is seen.  No abnormal edema pattern 
is seen in a vascular distribution to suggest large artery infarction. Evaluation with bone windows shows no acute osseous changes.  The visualized 
sinuses, middle ears, and mastoid air cells are well aerated. Impression:    
IMPRESSION:   
1.  No acute intracranial process evident by noncontrast CT study of the head. XR CHEST SNGL V [[id number]] Collected: 11/18/19 2041 Order Status: Completed Updated: 11/18/19 2044 Narrative:    
EXAM: XR CHEST SNGL V 
 
INDICATION: neutropenic fever COMPARISON: 9/29/2019 FINDINGS: A portable AP radiograph of the chest was obtained at 1946 hours. The 
patient is on a cardiac monitor. The lungs are clear. The cardiac and 
mediastinal contours and pulmonary vascularity are normal.  The bones and soft 
tissues are grossly within normal limits. Impression:    
IMPRESSION: Normal chest.  
XR ELBOW RT MIN 3 V [[id number]] Collected: 11/10/19 1421 Order Status: Completed Updated: 11/10/19 1424 Narrative:    
Right elbow Clinical location: Elbow pain after feeling a pop, decreased range of motion FINDINGS: Four views of the right elbow show no fracture or dislocation. There 
is no joint effusion. The soft tissues are unremarkable. Impression:    
IMPRESSION: No acute osseous abnormality or joint derangement of the right 
elbow. ASSESSMENT: 
Problem List  Date Reviewed: 10/31/2019 Codes Class Noted Febrile neutropenia (HCC) ICD-10-CM: D70.9, R50.81 ICD-9-CM: 288.00, 780.61  9/21/2019 Pancytopenia due to antineoplastic chemotherapy [facility] ICD-10-CM: D61.810, T45.1X5A 
ICD-9-CM: 284.11, E933.1  6/12/2019 Cellulitis of neck ICD-10-CM: E57.669 ICD-9-CM: 682.1  6/12/2019 Immunocompromised status associated with infection ICD-10-CM: B99.9 ICD-9-CM: 136.9  6/12/2019 Port or reservoir infection ICD-10-CM: Y86.407Q ICD-9-CM: 999.33  6/12/2019 Acute myeloid [facility] not having achieved [facility] ICD-10-CM: C92.00 ICD-9-CM: 205.00  5/9/2019 Admission for antineoplastic chemotherapy ICD-10-CM: Z51.11 ICD-9-CM: V58.11  5/5/2019 AML (acute myeloblastic leukemia) ([facility] [address] ICD-10-CM: C92.00 ICD-9-CM: 205.00  4/28/2019 Weakness generalized ICD-10-CM: R53.1 ICD-9-CM: 780.79  4/28/2019 Pancytopenia ([facility] ICD-10-CM: X81.299 ICD-9-CM: 284.19  4/28/2019 Thrombocytopenia ([facility] ICD-10-CM: D69.6 ICD-9-CM: 287.5  4/27/2019 Ms. Ashanti Brown is a 68 y.o. female admitted on 11/7/2019. She is a known patient of Dr. 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 She failed induction with 7+3/Midostaurin. On Dacogen/Gilteritinib with recent BMbx with persistent residual disease. She is being admitted for FLAG-VENITA. She is feeling well and is ready to proceed with treatment. PLAN: 
AML 
- admit for salvage FLAG-VENITA 
- needs Echo prior - ordered 11/8 Day 1 FLAG-VENITA. Echo with EF 55-60%, proceed with tx. 
11/9 Day 2 FLAG-VENITA. Tolerating well, no issues. Uric acid 3.1 today. 11/10 Day 3 FLAG-VENITA. No issues with treatment. Uric acid 3.3 today. 11/12 Day 5 FLAG-VENITA. Tolerating treatment well. G-CSF to start tomorrow. 11/13 Day 6 FLAG-VENITA, doing well, Granix/claritin starts today 11/19 Day 12 FLAG-VENITA. Awaiting count recovery, con't Granix. 11/20 Day 13 FLAG-VENITA. WBC 0. Continue Granix. Pancytopenia secondary to disease - Transfuse prn per [facility] SOPs 
11/19 Transfuse PRBCs today R elbow pain 11/11 c/o R elbow pain with limited ROM yesterday. Xray neg. Pain improved today, noted bruising. Normal ROM on exam. 
 
Mild Abd discomfort/loose stools 11/13 discomfort is improved from yesterday, will monitor 11/14 loose stools, but not watery, can use Imodium prn 
11/15 Imodium working well  
11/16 controlled Neutropenic fever / UTI 
11/19 Tmax 102. 1.  UA +UTI. UCx pending. BCx-NGTD. CXR neg. On Cef/Vanc. DC prophylactic LVQ. 11/20 Tmax 102. BCx positive for streptococcus/enterococcus. Subculture in progress. On Cef/Vanc.   
11/22 repeat BC NTD. Alpha strep on vanc. Sepsis not present on admission 11/21  BCx positive for streptococcus/enterococcus. Subculture in progress. Fall 
11/19 s/p last PM.  No LOC. Hit head. CT head neg. Double vision 11/21 Neuro has seen patient. Concerns for possible 6th nerve palsy. Recommends LP. We will hold with WBC 0.0 and continue to monitor. 11/22 vision improved today. MRI brain pending. Continue home meds Prophylactic Antibx: Acyclovir, Voriconazole [facility] SOPs SCDs for DVT prophylaxis (AC contraindicated d/t thrombocytopenia) Goals and plan of care reviewed with the patient. All questions answered to the best of our ability. Disposition:  Awaiting count recovery. Will need BMbx prior to discharge but not prior to day 28 chemo. Upon discharge will need twice weekly labs w transfusions as needed. Weekly provider visits. RTC within 1 week from discharge. Shikha Lauren, ARMANDO [facility] On[facility] [address] [place] [address] Office : [phone] Fax : [phone] Attending Addendum: 
I have personally performed a face to face diagnostic evaluation on this patient. I have reviewed and agree with the care plan as documented by Shikha Lauren, N.P. 36 minutes were spent on patient care, including but not limited to, reviewing the chart and time with the patient and family, more than 50% of the time documented was spent in face-to-face contact with the patient and in the care of the patient on the floor/unit where the patient is located. My findings are as follows: She has AML and received FLAG-VENITA, appears weak, heart rate regular without murmurs, abdomen is non-tender, bowel sounds are positive, we will continue IV ABX for her febrile neutropenia and follow-up her repeat cultures, MRI brain was unremarkable. Vincenzo Douglas MD 
 
 
[facility] Hematology/Oncology [address] [place] [address] Office : [phone] Fax : [phone]

## 2019-11-22 NOTE — PROGRESS NOTES
Chart screened for discharge needs. Pt is in count recovery. No needs identified at this time. Please consult  if any new issues arise Case Management will continue to follow

## 2019-11-22 NOTE — PROGRESS NOTES
Neurology Daily Progress Note Assessment:  
 
Isolated cranial nerve palsy, resolved. MRI negative for acute intracranial abnormalities. LP under fluroscopy was held by Hematology due to WBC 0.  
 
Plan:  
 
Continue supportive therapies. Subjective: Interval history: 
 
Patient is doing well. Diplopia resolved. MRI negative for acute intracranial abnormalities. LP under fluroscopy was held by Hematology due to WBC 0 History: 
 
Sheron Benites is a 68 y.o. female who is being seen for diplopia. Review of systems negative with exception of pertinent positives and negatives noted above. Objective:  
 
Vitals:  
 11/21/19 2300 11/22/19 0505 11/22/19 0809 11/22/19 1131 BP: 160/72 138/77 141/63 136/62 Pulse: 85 86 98 93 Resp: 20 18 18 18 Temp: 99.8 °F (37.7 °C) 98.6 °F (37 °C) 99.9 °F (37.7 °C) 99.5 °F (37.5 °C) SpO2: 98% 97% 95% 96% Weight:      
Height:      
  
 
 
Current Facility-Administered Medications:  
  potassium chloride 40 mEq IVPB, 40 mEq, IntraVENous, ONCE, Suyapa Mooney MD, Last Rate: 25 mL/hr at 11/22/19 1011, 40 mEq at 11/22/19 1011 
  sodium chloride 0.9 % bolus infusion 500 mL, 500 mL, IntraVENous, QHS, Lilly Valdivia, NP, 500 mL at 11/21/19 2315   baclofen (LIORESAL) tablet 5 mg, 5 mg, Oral, Q8H PRN, Yelitza Gonzalez MD, 5 mg at 11/21/19 1335   Vancomycin Trough Reminder, , Other, ONCE, Yelitza Gonzalez MD 
  potassium chloride (K-DUR, KLOR-CON) SR tablet 20 mEq, 20 mEq, Oral, BID, Yelitza Gonzalez MD, 20 mEq at 11/22/19 1010 
  vancomycin (VANCOCIN) 1250 mg in  ml infusion, 1,250 mg, IntraVENous, Q12H, Yelitza Gonzalez MD, Last Rate: 166.7 mL/hr at 11/21/19 2315, 1,250 mg at 11/21/19 2315   albuterol (PROVENTIL VENTOLIN) nebulizer solution 2.5 mg, 2.5 mg, Nebulization, Q6H PRN, Christine Jay NP, 2.5 mg at 11/21/19 1600 
  0.9% sodium chloride infusion 250 mL, 250 mL, IntraVENous, PRN, Jesica, Aide Mckoy MD, Last Rate: 15 mL/hr at 11/20/19 0604, 250 mL at 11/20/19 8250   alum-mag hydroxide-simeth (MYLANTA) oral suspension 30 mL, 30 mL, Oral, Q4H PRN, Naa Zepeda MD, 30 mL at 11/21/19 4575   cefepime (MAXIPIME) 2 g in 0.9% sodium chloride (MBP/ADV) 100 mL, 2 g, IntraVENous, Q8H, Suyapa Mooney MD, Last Rate: 200 mL/hr at 11/22/19 1012, 2 g at 11/22/19 1012 
  0.9% sodium chloride infusion 250 mL, 250 mL, IntraVENous, PRN, Malina Akers, NP 
  loperamide (IMODIUM) capsule 2 mg, 2 mg, Oral, Q4H PRN, Naa Zepeda MD, 2 mg at 11/19/19 2125   loratadine (CLARITIN) tablet 10 mg, 10 mg, Oral, DAILY, Jolanta Tran Patient, NP, 10 mg at 11/22/19 1010 
  central line flush (saline) syringe 10 mL, 10 mL, InterCATHeter, PRN, Naa Zepeda MD, 10 mL at 11/18/19 2110 
  docusate sodium (COLACE) capsule 100 mg, 100 mg, Oral, BID PRN, Malina Akers, ARMANDO, 100 mg at 11/11/19 1308   LORazepam (ATIVAN) injection 0.5 mg, 0.5 mg, IntraVENous, Q6H PRN, Suyapa Mooney MD, 0.5 mg at 11/17/19 0546 
  saline peripheral flush soln 10 mL, 10 mL, InterCATHeter, PRN, Naa Zepeda MD, 10 mL at 11/13/19 2154   heparin (porcine) pf 300-500 Units, 300-500 Units, InterCATHeter, PRN, Naa Zepeda MD 
  tbo-filgrastim [facility]) injection 480 mcg, 480 mcg, SubCUTAneous, QHS, Naa Zepeda MD, 480 mcg at 11/21/19 2315   acyclovir (ZOVIRAX) tablet 400 mg, 400 mg, Oral, BID, Malina Akers, NP, 400 mg at 11/22/19 1010 
  allopurinol (ZYLOPRIM) tablet 300 mg, 300 mg, Oral, DAILY, Malina Screen, NP, 300 mg at 11/22/19 1010   cholecalciferol (VITAMIN D3) (400 Units /10 mcg) tablet 2 Tab, 800 Units, Oral, DAILY, Malina Screen, NP, 2 Tab at 11/21/19 8309   DULoxetine (CYMBALTA) capsule 30 mg, 30 mg, Oral, DAILY, Malina Screen, NP, 30 mg at 11/22/19 1010   lidocaine-prilocaine (EMLA) 2.5-2.5 % cream, , Topical, PRN, Malina Screen, NP 
  LORazepam (ATIVAN) tablet 1 mg, 1 mg, Oral, QHS, Malina Screen, NP, 1 mg at 11/21/19 5438   pravastatin (PRAVACHOL) tablet 10 mg, 10 mg, Oral, QHS, Maya Valderrama NP, 10 mg at 11/21/19 2314   voriconazole (VFEND) tablet 200 mg, 200 mg, Oral, Q12H, Maya Valderrama NP, 200 mg at 11/22/19 1010 
  ondansetron (ZOFRAN) injection 4 mg, 4 mg, IntraVENous, Q4H PRN, Maya Valderrama NP, 4 mg at 11/18/19 1455   prochlorperazine (COMPAZINE) with saline injection 5 mg, 5 mg, IntraVENous, Q6H PRN, Maya Valderrama NP, 5 mg at 11/18/19 1632 
  HYDROcodone-acetaminophen (NORCO) 5-325 mg per tablet 1 Tab, 1 Tab, Oral, Q6H PRN, Maya Valderrama NP, 1 Tab at 11/11/19 3375   morphine injection 2 mg, 2 mg, IntraVENous, Q4H PRN, Maya Valderrama NP 
  acetaminophen (TYLENOL) tablet 650 mg, 650 mg, Oral, Q6H PRN, Adela Brush MD, 650 mg at 11/20/19 2019   diphenhydrAMINE (BENADRYL) capsule 25 mg, 25 mg, Oral, Q6H PRN, Adela Brush MD, 25 mg at 11/21/19 2314   acetaminophen/diphenhydrAMINE (TYLENOL PM EXT STR) 500/25 mg (Patient Supplied), , Oral, QHS, Adela Brush MD 
  vit C,Z-Xp-awdou-lutein-zeaxan (PRESERVISION AREDS-2) capsule 1 Cap (Patient Supplied), 1 Cap, Oral, DAILY, Adela Brush MD, 1 Cap at 11/22/19 1011 Recent Results (from the past 12 hour(s)) METABOLIC PANEL, COMPREHENSIVE Collection Time: 11/22/19  5:12 AM  
Result Value Ref Range Sodium 146 (H) 136 - 145 mmol/L Potassium 3.3 (L) 3.5 - 5.1 mmol/L Chloride 113 (H) 98 - 107 mmol/L  
 CO2 27 21 - 32 mmol/L Anion gap 6 (L) 7 - 16 mmol/L Glucose 87 65 - 100 mg/dL BUN 6 (L) 8 - 23 MG/DL Creatinine 0.50 (L) 0.6 - 1.0 MG/DL  
 GFR est AA >60 >60 ml/min/1.73m2 GFR est non-AA >60 >60 ml/min/1.73m2 Calcium 7.2 (L) 8.3 - 10.4 MG/DL Bilirubin, total 0.4 0.2 - 1.1 MG/DL  
 ALT (SGPT) 28 12 - 65 U/L  
 AST (SGOT) 16 15 - 37 U/L Alk. phosphatase 71 50 - 136 U/L Protein, total 5.4 (L) 6.3 - 8.2 g/dL Albumin 1.7 (L) 3.2 - 4.6 g/dL Globulin 3.7 (H) 2.3 - 3.5 g/dL A-G Ratio 0.5 (L) 1.2 - 3.5 MAGNESIUM  
 Collection Time: 11/22/19  5:12 AM  
Result Value Ref Range Magnesium 1.7 (L) 1.8 - 2.4 mg/dL LD Collection Time: 11/22/19  5:12 AM  
Result Value Ref Range  (H) 110 - 210 U/L  
URIC ACID Collection Time: 11/22/19  5:12 AM  
Result Value Ref Range Uric acid 1.6 (L) 2.6 - 6.0 MG/DL  
PHOSPHORUS Collection Time: 11/22/19  5:12 AM  
Result Value Ref Range Phosphorus 2.3 2.3 - 3.7 MG/DL  
CBC WITH AUTOMATED DIFF Collection Time: 11/22/19  5:12 AM  
Result Value Ref Range WBC 0.0 (LL) 4.3 - 11.1 K/uL  
 RBC 2.52 (L) 4.05 - 5.2 M/uL HGB 7.5 (L) 11.7 - 15.4 g/dL HCT 22.2 (L) 35.8 - 46.3 % MCV 88.1 79.6 - 97.8 FL  
 MCH 29.8 26.1 - 32.9 PG  
 MCHC 33.8 31.4 - 35.0 g/dL  
 RDW 14.1 11.9 - 14.6 % PLATELET 25 (LL) 774 - 450 K/uL MPV 10.3 9.4 - 12.3 FL ABSOLUTE NRBC 0.00 0.0 - 0.2 K/uL  
 RBC COMMENTS NORMOCYTIC/NORMOCHROMIC    
 WBC COMMENTS WBC TOO FEW TO DIFFERENTIATE PLATELET COMMENTS MARKED    
 DF MANUAL MRI Results (most recent): 
Results from Hospital Encounter encounter on 11/07/19 MRI BRAIN W WO CONT Narrative Clinical History: The patient is a 68years year old Female presenting with 
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 
was performed. In addition, high resolution axial and coronal pre and 
postcontrast images of the orbits were obtained. 18 mL of gadoterate meglumine (DOTAREM) 0.5 mmol/mL (376.9 mg/mL) contrast was administered intravenously. Findings: There is mild confluent chronic periventricular white matter disease with few 
lacunae throughout the corona radiata and centrum semiovale. Normal gray-white 
matter differentiation is seen for age. There are no areas of abnormal 
enhancement. There are no abnormal extra-axial fluid collections.  No evidence of 
 mass or mass effect is seen. There is no diffusion signal abnormality. Expected 
flow voids are maintained in the major intracranial vessels. High-resolution imaging of the orbits demonstrates symmetric lobes with 
unremarkable retrobulbar fat tissues. The optic nerves are symmetric as are the 
extraocular muscles. Specifically, there is no abnormality involving the lateral 
rectus muscles. The optic chiasm is unremarkable and there is no evidence of 
sellar or parasellar mass. 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 There is no 
evidence of Chiari malformation. The ventricular system and CSF containing spaces are unremarkable in appearance. Visualized extracranial soft tissues are unremarkable. The paranasal sinuses are well pneumatized and aerated. Impression Impression: 1. Minimal chronic white matter disease without acute intracranial abnormality. 2. Unremarkable orbital MRI. CPT Code:  55489 Physical Exam: 
General - Well developed, well nourished, in no apparent distress. Pleasant and conversent. HEENT - Normocephalic, atraumatic. Conjunctiva are clear. Neck - Supple without masses Cardiovascular - Regular rate and rhythm. Normal S1, S2 without murmurs, rubs, or gallops. Lungs - Clear to auscultation. Abdomen - Soft, nontender with normal bowel sounds. Extremities - Peripheral pulses intact. No edema and no rashes. Neurological examination - Comprehension, attention, memory and reasoning are intact. Language and speech are normal.  
On cranial nerve examination, (II, III, IV, VI) pupils are equal, round, and reactive to light. Visual acuity is adequate. Visual fields are full to finger confrontation. Extraocular motility is normal. (V, VII) Face is symmetric and sensation is intact to light touch. (VIII) Hearing is intact. (IX, X) Palate and uvula elevate symmetrically.  Voice is normal. (XI) Shoulder shrug is strong and equal bilaterally. (XII)Tongue is midline. Motor examination - There is normal muscle tone and bulk. Power is 5/5 in [address] Muscle stretch reflexes are normoactive and there are no pathological reflexes present. Plantar response is flexor. Sensation is intact to light touch, pinprick, vibration and proprioception in all extremities. Cerebellar examination is normal finger/nose and heel/shin Signed By: Cornelius Barahona NP November 22, 2019

## 2019-11-22 NOTE — PROGRESS NOTES
Pharmacokinetic Consult to Pharmacist 
 
Cuhyambrosio Kasandra is a 68 y.o. female being treated for febrile neutropenia with vancomycin. Height: 5' 6\" (167.6 cm)  Weight: 89 kg (196 lb 1.6 oz) Lab Results Component Value Date/Time BUN 6 (L) 11/22/2019 05:12 AM  
 Creatinine 0.50 (L) 11/22/2019 05:12 AM  
 WBC 0.0 (LL) 11/22/2019 05:12 AM  
 Procalcitonin 0.1 09/21/2019 06:50 PM  
 Lactic Acid (POC) 0.67 09/21/2019 08:51 PM  
  
Estimated Creatinine Clearance: 80.5 mL/min (A) (by C-G formula based on SCr of 0.5 mg/dL (L)). CULTURES: 
Results Procedure Component Value Units Date/Time [facility] [[id number]] Collected:  11/22/19 5719 Order Status:  Completed Specimen:  Stool Updated:  11/22/19 4294 Daphane Livers [[id number]] Collected:  11/22/19 8111 Order Status:  Completed Specimen:  Stool Updated:  11/22/19 9777 CULTURE, BLOOD [[id number]] Collected:  11/21/19 0106 Order Status:  Completed Specimen:  Blood Updated:  11/22/19 1625 Special Requests: --     
  RIGHT 
HAND Culture result: NO GROWTH 1 DAY     
 CULTURE, BLOOD [[id number]] Collected:  11/20/19 2028 Order Status:  Completed Specimen:  Blood Updated:  11/22/19 6903 Special Requests: PORT Culture result: NO GROWTH 2 DAYS     
 CULTURE, STOOL [[id number]] Collected:  11/19/19 0415 Order Status:  Canceled Specimen:  Stool CULTURE, URINE [[id number]] Collected:  11/19/19 0102 Order Status:  Completed Specimen:  Urine from Clean catch Updated:  11/21/19 0710 Special Requests: NO SPECIAL REQUESTS Culture result: NO GROWTH 2 DAYS     
 CULTURE, BLOOD [[id number]] Collected:  11/18/19 2032 Order Status:  Completed Specimen:  Blood Updated:  11/22/19 6620 Special Requests: --     
  RIGHT 
HAND Culture result: NO GROWTH 4 DAYS     
 CULTURE, BLOOD [[id number]]  (Abnormal)  (Susceptibility) Collected:  11/18/19 1955 Order Status:  Completed Specimen:  Blood Updated:  11/22/19 0730 Special Requests: LATERAL PORT     
  GRAM STAIN GRAM POS COCCI IN CHAINS AEROBIC AND ANAEROBIC BOTTLES RESULTS VERIFIED, PHONED TO AND READ BACK BY EJ CORREA RN @ 4030 ON 11/19/2019 BY AMM Culture result:    
  ENTEROCOCCUS FAECALIS GROUP D ALPHA STREPTOCOCCUS, NOT S. PNEUMONIAE THIS ORGANISM MAY BE INDICATIVE OF CULTURE CONTAMINATION, HOWEVER, CLINICAL CORRELATION NEEDS TO BE EVALUATED, AS EACH CASE IS UNIQUE. REFER TO [facility] Renzo Mendoza PANEL [id number] Susceptibility Enterococcus  faecalis group D  
  CARINE Ampicillin ($) Susceptible Gentamicin Synergy S Resistant Penicillin G ($$) Susceptible Streptomycin Synergy Susceptible Vancomycin ($) Susceptible BLOOD CULTURE ID PANEL [[id number]]  (Abnormal) Collected:  11/18/19 1955 Order Status:  Completed Specimen:  Blood Updated:  11/19/19 1420 Acc. no. from [facility] [id number] Enterococcus DETECTED Streptococcus DETECTED Comment: RESULTS VERIFIED, PHONED TO AND READ BACK BY 
EJ CORREA RN @ 0561 ON 11/19/2019 BY WILLOW Barrett Overall A/B (Vancomycin Resistance Gene) NOT DETECTED Clinical Consideration Multiple organisms detected. Results do not replace susceptibility testing. Lab Results Component Value Date/Time Vancomycin,trough 12.0 11/22/2019 10:51 AM  
 
 
Day 5 of vancomycin. Goal trough is 15-20. Will increase vancomycin to 1750mg q 12 hours. Will continue to follow patient. Thank you, Shira James, YayoD

## 2019-11-23 LAB
ALBUMIN SERPL-MCNC: 1.7 G/DL (ref 3.2–4.6)
ALBUMIN/GLOB SERPL: 0.4 {RATIO} (ref 1.2–3.5)
ALP SERPL-CCNC: 72 U/L (ref 50–136)
ALT SERPL-CCNC: 32 U/L (ref 12–65)
ANION GAP SERPL CALC-SCNC: 9 MMOL/L (ref 7–16)
AST SERPL-CCNC: 12 U/L (ref 15–37)
BACTERIA SPEC CULT: NORMAL
BILIRUB SERPL-MCNC: 0.4 MG/DL (ref 0.2–1.1)
BUN SERPL-MCNC: 10 MG/DL (ref 8–23)
CALCIUM SERPL-MCNC: 8.1 MG/DL (ref 8.3–10.4)
CHLORIDE SERPL-SCNC: 110 MMOL/L (ref 98–107)
CO2 SERPL-SCNC: 26 MMOL/L (ref 21–32)
CREAT SERPL-MCNC: 0.57 MG/DL (ref 0.6–1)
DIFFERENTIAL METHOD BLD: ABNORMAL
ERYTHROCYTE [DISTWIDTH] IN BLOOD BY AUTOMATED COUNT: 13.8 % (ref 11.9–14.6)
GLOBULIN SER CALC-MCNC: 4.1 G/DL (ref 2.3–3.5)
GLUCOSE SERPL-MCNC: 100 MG/DL (ref 65–100)
HCT VFR BLD AUTO: 21.6 % (ref 35.8–46.3)
HGB BLD-MCNC: 7.3 G/DL (ref 11.7–15.4)
LDH SERPL L TO P-CCNC: 280 U/L (ref 110–210)
MAGNESIUM SERPL-MCNC: 1.8 MG/DL (ref 1.8–2.4)
MCH RBC QN AUTO: 30 PG (ref 26.1–32.9)
MCHC RBC AUTO-ENTMCNC: 33.8 G/DL (ref 31.4–35)
MCV RBC AUTO: 88.9 FL (ref 79.6–97.8)
NRBC # BLD: 0 K/UL (ref 0–0.2)
PHOSPHATE SERPL-MCNC: 2.5 MG/DL (ref 2.3–3.7)
PLATELET # BLD AUTO: 12 K/UL (ref 150–450)
PLATELET COMMENTS,PCOM: ABNORMAL
PMV BLD AUTO: 10.2 FL (ref 9.4–12.3)
POTASSIUM SERPL-SCNC: 3.7 MMOL/L (ref 3.5–5.1)
PROT SERPL-MCNC: 5.8 G/DL (ref 6.3–8.2)
RBC # BLD AUTO: 2.43 M/UL (ref 4.05–5.2)
RBC MORPH BLD: ABNORMAL
SERVICE CMNT-IMP: NORMAL
SODIUM SERPL-SCNC: 145 MMOL/L (ref 136–145)
URATE SERPL-MCNC: 1.4 MG/DL (ref 2.6–6)
WBC # BLD AUTO: 0 K/UL (ref 4.3–11.1)
WBC MORPH BLD: ABNORMAL

## 2019-11-23 PROCEDURE — 74011250637 HC RX REV CODE- 250/637: Performed by: INTERNAL MEDICINE

## 2019-11-23 PROCEDURE — 36591 DRAW BLOOD OFF VENOUS DEVICE: CPT

## 2019-11-23 PROCEDURE — 84100 ASSAY OF PHOSPHORUS: CPT

## 2019-11-23 PROCEDURE — 65270000029 HC RM PRIVATE

## 2019-11-23 PROCEDURE — 77010033678 HC OXYGEN DAILY

## 2019-11-23 PROCEDURE — 85025 COMPLETE CBC W/AUTO DIFF WBC: CPT

## 2019-11-23 PROCEDURE — 74011250636 HC RX REV CODE- 250/636: Performed by: INTERNAL MEDICINE

## 2019-11-23 PROCEDURE — 94760 N-INVAS EAR/PLS OXIMETRY 1: CPT

## 2019-11-23 PROCEDURE — 84550 ASSAY OF BLOOD/URIC ACID: CPT

## 2019-11-23 PROCEDURE — 74011000258 HC RX REV CODE- 258: Performed by: INTERNAL MEDICINE

## 2019-11-23 PROCEDURE — 83615 LACTATE (LD) (LDH) ENZYME: CPT

## 2019-11-23 PROCEDURE — 99233 SBSQ HOSP IP/OBS HIGH 50: CPT | Performed by: INTERNAL MEDICINE

## 2019-11-23 PROCEDURE — 74011250637 HC RX REV CODE- 250/637: Performed by: NURSE PRACTITIONER

## 2019-11-23 PROCEDURE — 83735 ASSAY OF MAGNESIUM: CPT

## 2019-11-23 PROCEDURE — 65270000015 HC RM PRIVATE ONCOLOGY

## 2019-11-23 PROCEDURE — 80053 COMPREHEN METABOLIC PANEL: CPT

## 2019-11-23 RX ADMIN — Medication 10 ML: at 22:29

## 2019-11-23 RX ADMIN — TBO-FILGRASTIM 480 MCG: 480 INJECTION, SOLUTION SUBCUTANEOUS at 22:29

## 2019-11-23 RX ADMIN — VORICONAZOLE 200 MG: 200 TABLET, FILM COATED ORAL at 09:58

## 2019-11-23 RX ADMIN — ACYCLOVIR 400 MG: 800 TABLET ORAL at 09:59

## 2019-11-23 RX ADMIN — VORICONAZOLE 200 MG: 200 TABLET, FILM COATED ORAL at 22:26

## 2019-11-23 RX ADMIN — LOPERAMIDE HYDROCHLORIDE 2 MG: 2 CAPSULE ORAL at 10:07

## 2019-11-23 RX ADMIN — VANCOMYCIN HYDROCHLORIDE 1750 MG: 10 INJECTION, POWDER, LYOPHILIZED, FOR SOLUTION INTRAVENOUS at 13:53

## 2019-11-23 RX ADMIN — DULOXETINE 30 MG: 30 CAPSULE, DELAYED RELEASE ORAL at 09:58

## 2019-11-23 RX ADMIN — VANCOMYCIN HYDROCHLORIDE 1750 MG: 10 INJECTION, POWDER, LYOPHILIZED, FOR SOLUTION INTRAVENOUS at 01:14

## 2019-11-23 RX ADMIN — LORAZEPAM 1 MG: 1 TABLET ORAL at 22:26

## 2019-11-23 RX ADMIN — ALLOPURINOL 300 MG: 300 TABLET ORAL at 09:59

## 2019-11-23 RX ADMIN — POTASSIUM CHLORIDE 20 MEQ: 20 TABLET, EXTENDED RELEASE ORAL at 17:38

## 2019-11-23 RX ADMIN — LORATADINE 10 MG: 10 TABLET ORAL at 09:58

## 2019-11-23 RX ADMIN — CEFEPIME 2 G: 2 INJECTION, POWDER, FOR SOLUTION INTRAVENOUS at 09:50

## 2019-11-23 RX ADMIN — ACYCLOVIR 400 MG: 800 TABLET ORAL at 17:38

## 2019-11-23 RX ADMIN — PRAVASTATIN SODIUM 10 MG: 20 TABLET ORAL at 22:26

## 2019-11-23 RX ADMIN — CEFEPIME 2 G: 2 INJECTION, POWDER, FOR SOLUTION INTRAVENOUS at 00:07

## 2019-11-23 RX ADMIN — POTASSIUM CHLORIDE 20 MEQ: 20 TABLET, EXTENDED RELEASE ORAL at 09:59

## 2019-11-23 RX ADMIN — Medication 2 TABLET: at 09:58

## 2019-11-23 RX ADMIN — CEFEPIME 2 G: 2 INJECTION, POWDER, FOR SOLUTION INTRAVENOUS at 17:38

## 2019-11-23 NOTE — PROGRESS NOTES
Problem: Falls - Risk of 
Goal: *Absence of Falls Description Document Valarie Tobar Fall Risk and appropriate interventions in the flowsheet. Outcome: Progressing Towards Goal 
Note: Fall Risk Interventions: 
Mobility Interventions: Patient to call before getting OOB Medication Interventions: Evaluate medications/consider consulting pharmacy Elimination Interventions: Call light in reach History of Falls Interventions: Consult care management for discharge planning

## 2019-11-23 NOTE — PROGRESS NOTES
Pt's temp 101.7. Tylenol given. Spoke with Yamileth Mock NP at 2013 and no new orders received. Will continue to monitor pt

## 2019-11-23 NOTE — PROGRESS NOTES
[facility] Hematology & Oncology Inpatient Hematology / Oncology Daily Progress Note Reason for Admission:  Admission for antineoplastic chemotherapy [Z51.11] 24 Hour Events: 
Walking the halls-PT consult BCx- + enterococcus/streptococcus Repeat BC NTD Day 16 FLAG-VENITA Awaiting count recovery, on Granix Tmax 101.7 Brain MRI unremarkable-no need for LP 
 
 
ROS: 
Constitutional: +fatigue -fever CV: Negative for chest pain, palpitations, edema. Respiratory: Negative for dyspnea, cough, wheezing. GI: Negative for nausea, abdominal pain. 10 point review of systems is otherwise negative with the exception of the elements mentioned above in the HPI. No Known Allergies Past Medical History:  
Diagnosis Date  AML (acute myeloid leukemia) ([facility] [address] dx- 4/2019  
 followed by dr Lorenzo Lawrence  Depression  Hypercholesterolemia  Infection   
 of port -- was placed 5/2019-- removed 6/2019---right chest  
 Psychiatric disorder   
 aniexty  Sleep apnea Past Surgical History:  
Procedure Laterality Date  HX OTHER SURGICAL    
 colonoscopy  HX VASCULAR ACCESS    
 IR INSERT TUNL CVC W PORT OVER 5 YEARS  4/30/2019  IR INSERT TUNL CVC W PORT OVER 5 YEARS  7/15/2019  IR REMOVE TUNL CVAD W PORT/PUMP  6/13/2019 Family History Problem Relation Age of Onset  Cancer Father Social History Socioeconomic History  Marital status:  Spouse name: Not on file  Number of children: Not on file  Years of education: Not on file  Highest education level: Not on file Occupational History  Not on file Social Needs  Financial resource strain: Not on file  Food insecurity:  
  Worry: Not on file Inability: Not on file  Transportation needs:  
  Medical: Not on file Non-medical: Not on file Tobacco Use  Smoking status: Never Smoker  Smokeless tobacco: Never Used Substance and Sexual Activity  Alcohol use: Never Frequency: Never  Drug use: Never  Sexual activity: Not on file Lifestyle  Physical activity:  
  Days per week: Not on file Minutes per session: Not on file  Stress: Not on file Relationships  Social connections:  
  Talks on phone: Not on file Gets together: Not on file Attends Taoism service: Not on file Active member of club or organization: Not on file Attends meetings of clubs or organizations: Not on file Relationship status: Not on file  Intimate partner violence:  
  Fear of current or ex partner: Not on file Emotionally abused: Not on file Physically abused: Not on file Forced sexual activity: Not on file Other Topics Concern [address] Service Not Asked  Blood Transfusions Not Asked  Caffeine Concern Not Asked  Occupational Exposure Not Asked [employer]ath[employer] Hazards Not Asked  Sleep Concern Not Asked  Stress Concern Not Asked  Weight Concern Not Asked  Special Diet Not Asked  Back Care Not Asked  Exercise Not Asked  Bike Helmet Not Asked  Seat Belt Not Asked  Self-Exams Not Asked Social History Narrative  Not on file Current Facility-Administered Medications Medication Dose Route Frequency Provider Last Rate Last Dose  vancomycin (VANCOCIN) 1750 mg in  ml infusion  1,750 mg IntraVENous Q12H Reba Mooney  mL/hr at 11/23/19 0114 1,750 mg at 11/23/19 0114  
 sodium chloride 0.9 % bolus infusion 500 mL  500 mL IntraVENous QHS Nataliia Sheffield NP   500 mL at 11/21/19 2315  baclofen (LIORESAL) tablet 5 mg  5 mg Oral Q8H PRN Clare Cook MD   5 mg at 11/21/19 1335  potassium chloride (K-DUR, KLOR-CON) SR tablet 20 mEq  20 mEq Oral BID Clare Cook MD   20 mEq at 11/23/19 9970  albuterol (PROVENTIL VENTOLIN) nebulizer solution 2.5 mg  2.5 mg Nebulization Q6H PRN Christine Jay NP   2.5 mg at 11/21/19 1600  
 0.9% sodium chloride infusion 250 mL  250 mL IntraVENous PRN Jesica Maryann Herrera MD 15 mL/hr at 11/20/19 0604 250 mL at 11/20/19 2663  alum-mag hydroxide-simeth (MYLANTA) oral suspension 30 mL  30 mL Oral Q4H PRN Juliana Horton MD   30 mL at 11/21/19 9196  cefepime (MAXIPIME) 2 g in 0.9% sodium chloride (MBP/ADV) 100 mL  2 g IntraVENous Q8H Juliana Horton  mL/hr at 11/23/19 0950 2 g at 11/23/19 0950  
 0.9% sodium chloride infusion 250 mL  250 mL IntraVENous PRN Justin Rodriguez NP      
 loperamide (IMODIUM) capsule 2 mg  2 mg Oral Q4H PRN Juliana Horton MD   2 mg at 11/23/19 1007  loratadine (CLARITIN) tablet 10 mg  10 mg Oral DAILY Dinesh Taylor NP   10 mg at 11/23/19 3916  central line flush (saline) syringe 10 mL  10 mL InterCATHeter PRN Juliana Horton MD   10 mL at 11/18/19 2110  
 docusate sodium (COLACE) capsule 100 mg  100 mg Oral BID PRN Justin Rodriguez NP   100 mg at 11/11/19 1308  LORazepam (ATIVAN) injection 0.5 mg  0.5 mg IntraVENous Q6H PRN Juliana Horton MD   0.5 mg at 11/17/19 0546  
 saline peripheral flush soln 10 mL  10 mL InterCATHeter PRN Juliana Horton MD   10 mL at 11/13/19 2154  heparin (porcine) pf 300-500 Units  300-500 Units InterCATHeter PRN Juliana Horton MD      
 tbo-filgrastim [facility]) injection 480 mcg  480 mcg SubCUTAneous QHS Juliana Horton MD   480 mcg at 11/22/19 2154  acyclovir (ZOVIRAX) tablet 400 mg  400 mg Oral BID Justin Rodriguez NP   400 mg at 11/23/19 8619  allopurinol (ZYLOPRIM) tablet 300 mg  300 mg Oral DAILY Justin Rodriguez NP   300 mg at 11/23/19 3015  cholecalciferol (VITAMIN D3) (400 Units /10 mcg) tablet 2 Tab  800 Units Oral DAILY Justin Rodriguez NP   2 Tab at 11/23/19 5278  DULoxetine (CYMBALTA) capsule 30 mg  30 mg Oral DAILY Justin Rodriguez NP   30 mg at 11/23/19 8427  lidocaine-prilocaine (EMLA) 2.5-2.5 % cream   Topical PRN Justin Rodriguez NP      
 LORazepam (ATIVAN) tablet 1 mg  1 mg Oral QHS Justin Rodriguez NP   1 mg at 11/22/19 0433  pravastatin (PRAVACHOL) tablet 10 mg  10 mg Oral QHS Justin Rodriguez NP 10 mg at 19  voriconazole (VFEND) tablet 200 mg  200 mg Oral Q12H Colon Grapes, NP   200 mg at 19 6692  ondansetron (ZOFRAN) injection 4 mg  4 mg IntraVENous Q4H PRN Colon Grapes, NP   4 mg at 19 1455  prochlorperazine (COMPAZINE) with saline injection 5 mg  5 mg IntraVENous Q6H PRN Colon Grapes, NP   5 mg at 19 1632  
 HYDROcodone-acetaminophen (NORCO) 5-325 mg per tablet 1 Tab  1 Tab Oral Q6H PRN Colon Grapes, NP   1 Tab at 19 1371  morphine injection 2 mg  2 mg IntraVENous Q4H PRN Colon Grapes, NP      
 acetaminophen (TYLENOL) tablet 650 mg  650 mg Oral Q6H PRN Riki Allen MD   650 mg at 19  diphenhydrAMINE (BENADRYL) capsule 25 mg  25 mg Oral Q6H PRN Riki Allen MD   25 mg at 19  acetaminophen/diphenhydrAMINE (TYLENOL PM EXT STR) 500/25 mg (Patient Supplied)   Oral QHS Riki Allen MD      
 vit C,X-Ub-lgewb-lutein-zeaxan (PRESERVISION AREDS-2) capsule 1 Cap (Patient Supplied)  [address] Riki Allen MD   1 Cap at 19 3119 OBJECTIVE: 
Patient Vitals for the past 8 hrs: 
 BP Temp Pulse Resp SpO2  
19 1047 142/66 98.1 °F (36.7 °C) 97 18 95 % 19 0825 133/63 98.9 °F (37.2 °C) 84 18 92 % Temp (24hrs), Av.4 °F (37.4 °C), Min:98.1 °F (36.7 °C), Max:101.7 °F (38.7 °C) No intake/output data recorded. Physical Exam: 
Constitutional: Well developed, well nourished female in no acute distress, sitting in bed HEENT: Normocephalic and atraumatic. Oropharynx is clear, mucous membranes are moist. Extraocular muscles are intact. Sclerae anicteric. Skin Warm and dry. No rash noted. No erythema. No pallor. Bruising noted on BUE/R elbow and abdomen. Respiratory Lungs are clear to auscultation bilaterally without wheezes, rales or rhonchi, normal air exchange without accessory muscle use. CVS Normal rate, regular rhythm and normal S1 and S2. No murmurs, gallops, or rubs. Abdomen Soft, mildly tender across lower abd-improving, nondistended, normoactive bowel sounds. No palpable mass. No hepatosplenomegaly. Neuro Grossly nonfocal with no obvious sensory or motor deficits. MSK Normal range of motion in general.  No edema and no tenderness. Psych Appropriate mood and affect. Labs:   
Recent Results (from the past 24 hour(s)) METABOLIC PANEL, COMPREHENSIVE Collection Time: 11/23/19  2:57 AM  
Result Value Ref Range Sodium 145 136 - 145 mmol/L Potassium 3.7 3.5 - 5.1 mmol/L Chloride 110 (H) 98 - 107 mmol/L  
 CO2 26 21 - 32 mmol/L Anion gap 9 7 - 16 mmol/L Glucose 100 65 - 100 mg/dL BUN 10 8 - 23 MG/DL Creatinine 0.57 (L) 0.6 - 1.0 MG/DL  
 GFR est AA >60 >60 ml/min/1.73m2 GFR est non-AA >60 >60 ml/min/1.73m2 Calcium 8.1 (L) 8.3 - 10.4 MG/DL Bilirubin, total 0.4 0.2 - 1.1 MG/DL  
 ALT (SGPT) 32 12 - 65 U/L  
 AST (SGOT) 12 (L) 15 - 37 U/L Alk. phosphatase 72 50 - 136 U/L Protein, total 5.8 (L) 6.3 - 8.2 g/dL Albumin 1.7 (L) 3.2 - 4.6 g/dL Globulin 4.1 (H) 2.3 - 3.5 g/dL A-G Ratio 0.4 (L) 1.2 - 3.5 MAGNESIUM Collection Time: 11/23/19  2:57 AM  
Result Value Ref Range Magnesium 1.8 1.8 - 2.4 mg/dL LD Collection Time: 11/23/19  2:57 AM  
Result Value Ref Range  (H) 110 - 210 U/L  
URIC ACID Collection Time: 11/23/19  2:57 AM  
Result Value Ref Range Uric acid 1.4 (L) 2.6 - 6.0 MG/DL  
PHOSPHORUS Collection Time: 11/23/19  2:57 AM  
Result Value Ref Range Phosphorus 2.5 2.3 - 3.7 MG/DL  
CBC WITH AUTOMATED DIFF Collection Time: 11/23/19  2:57 AM  
Result Value Ref Range WBC 0.0 (LL) 4.3 - 11.1 K/uL  
 RBC 2.43 (L) 4.05 - 5.2 M/uL HGB 7.3 (L) 11.7 - 15.4 g/dL HCT 21.6 (L) 35.8 - 46.3 % MCV 88.9 79.6 - 97.8 FL  
 MCH 30.0 26.1 - 32.9 PG  
 MCHC 33.8 31.4 - 35.0 g/dL  
 RDW 13.8 11.9 - 14.6 % PLATELET 12 (LL) 766 - 450 K/uL  MPV 10.2 9.4 - 12.3 FL  
 ABSOLUTE NRBC 0.00 0.0 - 0.2 K/uL  
 RBC COMMENTS MODERATE MICROCYTOSIS 
    
 WBC COMMENTS WBC TOO FEW TO DIFFERENTIATE PLATELET COMMENTS MARKED    
 DF MANUAL Imaging: 
CT HEAD WO CONT [[id number]] Collected: 11/19/19 1050 Order Status: Completed Updated: 11/19/19 1054 Narrative:    
CT HEAD WITHOUT CONTRAST, 11/19/2019 History: Fall last night hitting left side of head Comparison: . Technique:   5 mm axial scans from the skull base to the vertex. All CT scans 
performed at this facility use one or all of the following: Automated exposure 
control, adjustment of the mA and/or kVp according to patient's size, iterative 
reconstruction. Findings:  No evidence of intracranial hemorrhage is seen. Faint bilateral basal 
ganglia calcifications are seen. No abnormal extra-axial fluid collections are 
seen.  Mild cortical involutional changes are seen which are not felt to be 
abnormal given the patient's age. Yeoman Saris evidence for acute hydrocephalus is seen. No evidence of midline shift or herniation is seen.  No abnormal edema pattern 
is seen in a vascular distribution to suggest large artery infarction. Evaluation with bone windows shows no acute osseous changes.  The visualized 
sinuses, middle ears, and mastoid air cells are well aerated. Impression:    
IMPRESSION:   
1.  No acute intracranial process evident by noncontrast CT study of the head. XR CHEST SNGL V [[id number]] Collected: 11/18/19 2041 Order Status: Completed Updated: 11/18/19 2044 Narrative:    
EXAM: XR CHEST SNGL V 
 
INDICATION: neutropenic fever COMPARISON: 9/29/2019 FINDINGS: A portable AP radiograph of the chest was obtained at 1946 hours. The 
patient is on a cardiac monitor. The lungs are clear. The cardiac and 
mediastinal contours and pulmonary vascularity are normal.  The bones and soft 
tissues are grossly within normal limits.    
Impression:    
IMPRESSION: Normal chest.  
 XR ELBOW RT MIN 3 V [[id number]] Collected: 11/10/19 1421 Order Status: Completed Updated: 11/10/19 1424 Narrative:    
Right elbow Clinical location: Elbow pain after feeling a pop, decreased range of motion FINDINGS: Four views of the right elbow show no fracture or dislocation. There 
is no joint effusion. The soft tissues are unremarkable. Impression:    
IMPRESSION: No acute osseous abnormality or joint derangement of the right 
elbow. ASSESSMENT: 
Problem List  Date Reviewed: 10/31/2019 Codes Class Noted Febrile neutropenia (HCC) ICD-10-CM: D70.9, R50.81 ICD-9-CM: 288.00, 780.61  9/21/2019 Pancytopenia due to antineoplastic chemotherapy [facility]) ICD-10-CM: D61.810, T45.1X5A 
ICD-9-CM: 284.11, E933.1  6/12/2019 Cellulitis of neck ICD-10-CM: P05.210 ICD-9-CM: 682.1  6/12/2019 Immunocompromised status associated with infection ICD-10-CM: B99.9 ICD-9-CM: 136.9  6/12/2019 Port or reservoir infection ICD-10-CM: Q40.598W ICD-9-CM: 999.33  6/12/2019 Acute myeloid leukemia not having achieved remission ([facility] ICD-10-CM: C92.00 ICD-9-CM: 205.00  5/9/2019 Admission for antineoplastic chemotherapy ICD-10-CM: Z51.11 ICD-9-CM: V58.11  5/5/2019 AML (acute myeloblastic leukemia) ([facility] ICD-10-CM: C92.00 ICD-9-CM: 205.00  4/28/2019 Weakness generalized ICD-10-CM: R53.1 ICD-9-CM: 780.79  4/28/2019 Pancytopenia ([facility] ICD-10-CM: D13.914 ICD-9-CM: 284.19  4/28/2019 Thrombocytopenia ([facility] ICD-10-CM: D69.6 ICD-9-CM: 287.5  4/27/2019 Ms. Mynor Acuña is a 68 y.o. female admitted on 11/7/2019. She is a known patient of Dr. 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 She failed induction with 7+3/Midostaurin. On Dacogen/Gilteritinib with recent BMbx with persistent residual disease. She is being admitted for FLAG-VENITA. She is feeling well and is ready to proceed with treatment. PLAN: 
AML - admit for salvage FLAG-VENITA 
- needs Echo prior - ordered 11/8 Day 1 FLAG-VENITA. Echo with EF 55-60%, proceed with tx. 
11/9 Day 2 FLAG-VENITA. Tolerating well, no issues. Uric acid 3.1 today. 11/10 Day 3 FLAG-VENITA. No issues with treatment. Uric acid 3.3 today. 11/12 Day 5 FLAG-VENITA. Tolerating treatment well. G-CSF to start tomorrow. 11/13 Day 6 FLAG-VENITA, doing well, Granix/claritin starts today 11/19 Day 12 FLAG-VENITA. Awaiting count recovery, con't Granix. 11/20 Day 13 FLAG-VENITA. WBC 0. Continue Granix. Pancytopenia secondary to disease - Transfuse prn per [facility] SOPs 
11/19 Transfuse PRBCs today R elbow pain 11/11 c/o R elbow pain with limited ROM yesterday. Xray neg. Pain improved today, noted bruising. Normal ROM on exam. 
 
Mild Abd discomfort/loose stools 11/13 discomfort is improved from yesterday, will monitor 11/14 loose stools, but not watery, can use Imodium prn 
11/15 Imodium working well  
11/16 controlled Neutropenic fever / UTI 
11/19 Tmax 102. 1.  UA +UTI. UCx pending. BCx-NGTD. CXR neg. On Cef/Vanc. DC prophylactic LVQ. 11/20 Tmax 102. BCx positive for streptococcus/enterococcus. Subculture in progress. On Cef/Vanc.   
11/22 repeat BC NTD. Alpha strep on vanc. Sepsis not present on admission 11/21  BCx positive for streptococcus/enterococcus. Subculture in progress. Fall 
11/19 s/p last PM.  No LOC. Hit head. CT head neg. Double vision 11/21 Neuro has seen patient. Concerns for possible 6th nerve palsy. Recommends LP. We will hold with WBC 0.0 and continue to monitor. 11/22 vision improved today. MRI brain pending. 11/23 MRI unremarkable. Discussed with Neuro. No need for LP Continue home meds Prophylactic Antibx: Acyclovir, Voriconazole [facility] SOPs SCDs for DVT prophylaxis (AC contraindicated d/t thrombocytopenia) Goals and plan of care reviewed with the patient. All questions answered to the best of our ability. Disposition:  Awaiting count recovery. Will need BMbx prior to discharge but not prior to day 28 chemo. Upon discharge will need twice weekly labs w transfusions as needed. Weekly provider visits. RTC within 1 week from discharge. Esther Chen NP [facility] Hematology & Oncology [address] [place] [address] Office : [phone] Fax : [phone] Attending Addendum: 
I have personally performed a face to face diagnostic evaluation on this patient. I have reviewed and agree with the care plan as documented by Esther Chen, N.MIHIR. 36 minutes were spent on patient care, including but not limited to, reviewing the chart and time with the patient and family, more than 50% of the time documented was spent in face-to-face contact with the patient and in the care of the patient on the floor/unit where the patient is located. My findings are as follows: She has AML and received FLAG-VENITA and has febrile neutropenia, appears anxious, heart rate regular without murmurs, abdomen is non-tender, bowel sounds are positive, we will continue IV ABX and follow-up her repeat cultures. Tammie Bosworth, MD 
 
 
[facility] Hematology/Oncology [address] [place] [address] Office : [phone] Fax : [phone]

## 2019-11-24 LAB
ALBUMIN SERPL-MCNC: 1.6 G/DL (ref 3.2–4.6)
ALBUMIN/GLOB SERPL: 0.4 {RATIO} (ref 1.2–3.5)
ALP SERPL-CCNC: 69 U/L (ref 50–136)
ALT SERPL-CCNC: 24 U/L (ref 12–65)
ANION GAP SERPL CALC-SCNC: 5 MMOL/L (ref 7–16)
AST SERPL-CCNC: 13 U/L (ref 15–37)
BACTERIA SPEC CULT: NORMAL
BACTERIA SPEC CULT: NORMAL
BILIRUB SERPL-MCNC: 0.4 MG/DL (ref 0.2–1.1)
BUN SERPL-MCNC: 12 MG/DL (ref 8–23)
CALCIUM SERPL-MCNC: 7.8 MG/DL (ref 8.3–10.4)
CHLORIDE SERPL-SCNC: 111 MMOL/L (ref 98–107)
CO2 SERPL-SCNC: 28 MMOL/L (ref 21–32)
CREAT SERPL-MCNC: 0.49 MG/DL (ref 0.6–1)
DIFFERENTIAL METHOD BLD: ABNORMAL
ERYTHROCYTE [DISTWIDTH] IN BLOOD BY AUTOMATED COUNT: 13.8 % (ref 11.9–14.6)
GLOBULIN SER CALC-MCNC: 3.6 G/DL (ref 2.3–3.5)
GLUCOSE SERPL-MCNC: 96 MG/DL (ref 65–100)
HCT VFR BLD AUTO: 19.2 % (ref 35.8–46.3)
HGB BLD-MCNC: 6.5 G/DL (ref 11.7–15.4)
LDH SERPL L TO P-CCNC: 255 U/L (ref 110–210)
MAGNESIUM SERPL-MCNC: 1.8 MG/DL (ref 1.8–2.4)
MCH RBC QN AUTO: 30.1 PG (ref 26.1–32.9)
MCHC RBC AUTO-ENTMCNC: 33.9 G/DL (ref 31.4–35)
MCV RBC AUTO: 88.9 FL (ref 79.6–97.8)
NRBC # BLD: 0 K/UL (ref 0–0.2)
PHOSPHATE SERPL-MCNC: 3 MG/DL (ref 2.3–3.7)
PLATELET # BLD AUTO: 6 K/UL (ref 150–450)
PLATELET COMMENTS,PCOM: ABNORMAL
PMV BLD AUTO: 8.6 FL (ref 9.4–12.3)
POTASSIUM SERPL-SCNC: 3.6 MMOL/L (ref 3.5–5.1)
PROT SERPL-MCNC: 5.2 G/DL (ref 6.3–8.2)
RBC # BLD AUTO: 2.16 M/UL (ref 4.05–5.2)
RBC MORPH BLD: ABNORMAL
SERVICE CMNT-IMP: NORMAL
SODIUM SERPL-SCNC: 144 MMOL/L (ref 136–145)
URATE SERPL-MCNC: 1.2 MG/DL (ref 2.6–6)
WBC # BLD AUTO: 0 K/UL (ref 4.3–11.1)
WBC MORPH BLD: ABNORMAL

## 2019-11-24 PROCEDURE — 77010033678 HC OXYGEN DAILY

## 2019-11-24 PROCEDURE — 83735 ASSAY OF MAGNESIUM: CPT

## 2019-11-24 PROCEDURE — 83615 LACTATE (LD) (LDH) ENZYME: CPT

## 2019-11-24 PROCEDURE — 84100 ASSAY OF PHOSPHORUS: CPT

## 2019-11-24 PROCEDURE — 74011000258 HC RX REV CODE- 258: Performed by: INTERNAL MEDICINE

## 2019-11-24 PROCEDURE — 77030021668 HC NEB PREFIL KT VYRM -A

## 2019-11-24 PROCEDURE — 36430 TRANSFUSION BLD/BLD COMPNT: CPT

## 2019-11-24 PROCEDURE — 74011250636 HC RX REV CODE- 250/636: Performed by: NURSE PRACTITIONER

## 2019-11-24 PROCEDURE — 86900 BLOOD TYPING SEROLOGIC ABO: CPT

## 2019-11-24 PROCEDURE — 84550 ASSAY OF BLOOD/URIC ACID: CPT

## 2019-11-24 PROCEDURE — 65270000029 HC RM PRIVATE

## 2019-11-24 PROCEDURE — 36591 DRAW BLOOD OFF VENOUS DEVICE: CPT

## 2019-11-24 PROCEDURE — 86644 CMV ANTIBODY: CPT

## 2019-11-24 PROCEDURE — 86923 COMPATIBILITY TEST ELECTRIC: CPT

## 2019-11-24 PROCEDURE — P9037 PLATE PHERES LEUKOREDU IRRAD: HCPCS

## 2019-11-24 PROCEDURE — 74011250637 HC RX REV CODE- 250/637: Performed by: NURSE PRACTITIONER

## 2019-11-24 PROCEDURE — 85025 COMPLETE CBC W/AUTO DIFF WBC: CPT

## 2019-11-24 PROCEDURE — 80053 COMPREHEN METABOLIC PANEL: CPT

## 2019-11-24 PROCEDURE — 99233 SBSQ HOSP IP/OBS HIGH 50: CPT | Performed by: INTERNAL MEDICINE

## 2019-11-24 PROCEDURE — 65270000015 HC RM PRIVATE ONCOLOGY

## 2019-11-24 PROCEDURE — 74011250637 HC RX REV CODE- 250/637: Performed by: INTERNAL MEDICINE

## 2019-11-24 PROCEDURE — 74011250636 HC RX REV CODE- 250/636: Performed by: INTERNAL MEDICINE

## 2019-11-24 PROCEDURE — P9040 RBC LEUKOREDUCED IRRADIATED: HCPCS

## 2019-11-24 RX ORDER — SODIUM CHLORIDE 9 MG/ML
250 INJECTION, SOLUTION INTRAVENOUS AS NEEDED
Status: DISCONTINUED | OUTPATIENT
Start: 2019-11-24 | End: 2019-11-29

## 2019-11-24 RX ADMIN — CEFEPIME 2 G: 2 INJECTION, POWDER, FOR SOLUTION INTRAVENOUS at 09:49

## 2019-11-24 RX ADMIN — ACETAMINOPHEN 650 MG: 325 TABLET, FILM COATED ORAL at 18:26

## 2019-11-24 RX ADMIN — ACETAMINOPHEN 650 MG: 325 TABLET, FILM COATED ORAL at 12:59

## 2019-11-24 RX ADMIN — TBO-FILGRASTIM 480 MCG: 480 INJECTION, SOLUTION SUBCUTANEOUS at 21:34

## 2019-11-24 RX ADMIN — POTASSIUM CHLORIDE 20 MEQ: 20 TABLET, EXTENDED RELEASE ORAL at 09:47

## 2019-11-24 RX ADMIN — VANCOMYCIN HYDROCHLORIDE 1750 MG: 10 INJECTION, POWDER, LYOPHILIZED, FOR SOLUTION INTRAVENOUS at 01:29

## 2019-11-24 RX ADMIN — DIPHENHYDRAMINE HYDROCHLORIDE 25 MG: 25 CAPSULE ORAL at 21:33

## 2019-11-24 RX ADMIN — DIPHENHYDRAMINE HYDROCHLORIDE 25 MG: 25 CAPSULE ORAL at 12:59

## 2019-11-24 RX ADMIN — LORAZEPAM 1 MG: 1 TABLET ORAL at 21:33

## 2019-11-24 RX ADMIN — LOPERAMIDE HYDROCHLORIDE 2 MG: 2 CAPSULE ORAL at 10:00

## 2019-11-24 RX ADMIN — ACYCLOVIR 400 MG: 800 TABLET ORAL at 09:48

## 2019-11-24 RX ADMIN — ONDANSETRON 4 MG: 2 INJECTION INTRAMUSCULAR; INTRAVENOUS at 21:40

## 2019-11-24 RX ADMIN — CEFEPIME 2 G: 2 INJECTION, POWDER, FOR SOLUTION INTRAVENOUS at 00:30

## 2019-11-24 RX ADMIN — VORICONAZOLE 200 MG: 200 TABLET, FILM COATED ORAL at 21:34

## 2019-11-24 RX ADMIN — DULOXETINE 30 MG: 30 CAPSULE, DELAYED RELEASE ORAL at 09:00

## 2019-11-24 RX ADMIN — ACYCLOVIR 400 MG: 800 TABLET ORAL at 18:26

## 2019-11-24 RX ADMIN — ALLOPURINOL 300 MG: 300 TABLET ORAL at 09:48

## 2019-11-24 RX ADMIN — PRAVASTATIN SODIUM 10 MG: 20 TABLET ORAL at 21:33

## 2019-11-24 RX ADMIN — LORATADINE 10 MG: 10 TABLET ORAL at 09:47

## 2019-11-24 RX ADMIN — CEFEPIME 2 G: 2 INJECTION, POWDER, FOR SOLUTION INTRAVENOUS at 18:51

## 2019-11-24 RX ADMIN — VORICONAZOLE 200 MG: 200 TABLET, FILM COATED ORAL at 09:47

## 2019-11-24 RX ADMIN — VANCOMYCIN HYDROCHLORIDE 1750 MG: 10 INJECTION, POWDER, LYOPHILIZED, FOR SOLUTION INTRAVENOUS at 16:35

## 2019-11-24 RX ADMIN — POTASSIUM CHLORIDE 20 MEQ: 20 TABLET, EXTENDED RELEASE ORAL at 18:26

## 2019-11-24 RX ADMIN — Medication 2 TABLET: at 09:48

## 2019-11-24 NOTE — PROGRESS NOTES
Crackles noted in bases of lungs encouraged pt strongly to us IS and that you cant use it too much. Also encouraged ambulation with a walker  and mask pt ambulated from double door to double door in unit.

## 2019-11-24 NOTE — PROGRESS NOTES
[address] [facility] Hematology & Oncology Inpatient Hematology / Oncology Daily Progress Note Reason for Admission:  Admission for antineoplastic chemotherapy [Z51.11] 24 Hour Events: 
Walking the halls-PT consult BCx- + enterococcus/streptococcus Repeat BC NTD Day 17 FLAG-VENITA Awaiting count recovery, on Granix ROS: 
Constitutional: +fatigue -fever CV: Negative for chest pain, palpitations, edema. Respiratory: Negative for dyspnea, cough, wheezing. GI: Negative for nausea, abdominal pain. 10 point review of systems is otherwise negative with the exception of the elements mentioned above in the HPI. No Known Allergies Past Medical History:  
Diagnosis Date  AML (acute myeloid leukemia) ([facility] [address] dx- 4/2019  
 followed by dr Luis Antonio Johnson  Depression  Hypercholesterolemia  Infection   
 of port -- was placed 5/2019-- removed 6/2019---right chest  
 Psychiatric disorder   
 aniexty  Sleep apnea Past Surgical History:  
Procedure Laterality Date  HX OTHER SURGICAL    
 colonoscopy  HX VASCULAR ACCESS    
 IR INSERT TUNL CVC W PORT OVER 5 YEARS  4/30/2019  IR INSERT TUNL CVC W PORT OVER 5 YEARS  7/15/2019  IR REMOVE TUNL CVAD W PORT/PUMP  6/13/2019 Family History Problem Relation Age of Onset  Cancer Father Social History Socioeconomic History  Marital status:  Spouse name: Not on file  Number of children: Not on file  Years of education: Not on file  Highest education level: Not on file Occupational History  Not on file Social Needs  Financial resource strain: Not on file  Food insecurity:  
  Worry: Not on file Inability: Not on file  Transportation needs:  
  Medical: Not on file Non-medical: Not on file Tobacco Use  Smoking status: Never Smoker  Smokeless tobacco: Never Used Substance and Sexual Activity  Alcohol use: Never Frequency: Never  Drug use: Never  Sexual activity: Not on file Lifestyle  Physical activity:  
  Days per week: Not on file Minutes per session: Not on file  Stress: Not on file Relationships  Social connections:  
  Talks on phone: Not on file Gets together: Not on file Attends Sabianism service: Not on file Active member of club or organization: Not on file Attends meetings of clubs or organizations: Not on file Relationship status: Not on file  Intimate partner violence:  
  Fear of current or ex partner: Not on file Emotionally abused: Not on file Physically abused: Not on file Forced sexual activity: Not on file Other Topics Concern [address] Service Not Asked  Blood Transfusions Not Asked  Caffeine Concern Not Asked  Occupational Exposure Not Asked Sherlean [place] Hazards Not Asked  Sleep Concern Not Asked  Stress Concern Not Asked  Weight Concern Not Asked  Special Diet Not Asked  Back Care Not Asked  Exercise Not Asked  Bike Helmet Not Asked  Seat Belt Not Asked  Self-Exams Not Asked Social History Narrative  Not on file Current Facility-Administered Medications Medication Dose Route Frequency Provider Last Rate Last Dose  
 0.9% sodium chloride infusion 250 mL  250 mL IntraVENous PRN Viki Pearl MD      
 [facility] 1750 mg in  ml infusion  1,750 mg IntraVENous Q12H Viki Pearl  mL/hr at 11/24/19 0129 1,750 mg at 11/24/19 0129  
 sodium chloride 0.9 % bolus infusion 500 mL  500 mL IntraVENous QHS Kristin Mendosa NP   500 mL at 11/21/19 2315  baclofen (LIORESAL) tablet 5 mg  5 mg Oral Q8H PRN Viki Pearl MD   5 mg at 11/21/19 1335  potassium chloride (K-DUR, KLOR-CON) SR tablet 20 mEq  20 mEq Oral BID Viki Pearl MD   20 mEq at 11/23/19 1738  albuterol (PROVENTIL VENTOLIN) nebulizer solution 2.5 mg  2.5 mg Nebulization Q6H PRN Christine Jay NP   2.5 mg at 11/21/19 1600  alum-mag hydroxide-simeth (MYLANTA) oral suspension 30 mL  30 mL Oral Q4H PRN Stephen Farley MD   30 mL at 11/21/19 3796  cefepime (MAXIPIME) 2 g in 0.9% sodium chloride (MBP/ADV) 100 mL  2 g IntraVENous Q8H Stephen Farley  mL/hr at 11/24/19 0030 2 g at 11/24/19 0030  loperamide (IMODIUM) capsule 2 mg  2 mg Oral Q4H PRN Stephen Farley MD   2 mg at 11/23/19 1007  loratadine (CLARITIN) tablet 10 mg  10 mg Oral DAILY Shirley Bustillo NP   10 mg at 11/23/19 0184  central line flush (saline) syringe 10 mL  10 mL InterCATHeter PRN Stephen Farley MD   10 mL at 11/18/19 2110  
 docusate sodium (COLACE) capsule 100 mg  100 mg Oral BID PRN Sully Biswas, NP   100 mg at 11/11/19 1308  LORazepam (ATIVAN) injection 0.5 mg  0.5 mg IntraVENous Q6H PRN Stephen Farley MD   0.5 mg at 11/17/19 0546  
 saline peripheral flush soln 10 mL  10 mL InterCATHeter PRN Stephen Farley MD   10 mL at 11/23/19 2229  
 heparin (porcine) pf 300-500 Units  300-500 Units InterCATHeter PRN Stephen Farley MD      
 tbo-filgrastim [facility]) injection 480 mcg  480 mcg SubCUTAneous QHS Stephen Farley MD   480 mcg at 11/23/19 2229  acyclovir (ZOVIRAX) tablet 400 mg  400 mg Oral BID Mickiel Cage, NP   400 mg at 11/23/19 1738  allopurinol (ZYLOPRIM) tablet 300 mg  300 mg Oral DAILY Mickiel Cage, NP   300 mg at 11/23/19 2122  cholecalciferol (VITAMIN D3) (400 Units /10 mcg) tablet 2 Tab  800 Units Oral DAILY Mickiel Cage, NP   2 Tab at 11/23/19 9401  DULoxetine (CYMBALTA) capsule 30 mg  30 mg Oral DAILY Mickiel Cage, NP   30 mg at 11/23/19 5217  lidocaine-prilocaine (EMLA) 2.5-2.5 % cream   Topical PRN Mickiel Cage, NP      
 LORazepam (ATIVAN) tablet 1 mg  1 mg Oral QHS Mickiel Cage, NP   1 mg at 11/23/19 2226  pravastatin (PRAVACHOL) tablet 10 mg  10 mg Oral QHS Mickiel Cage, NP   10 mg at 11/23/19 2226  voriconazole (VFEND) tablet 200 mg  200 mg Oral Q12H Mickiel Cage, NP   200 mg at 11/23/19 2226  ondansetron (ZOFRAN) injection 4 mg  4 mg IntraVENous Q4H PRN Marlene Gin, NP   4 mg at 19 1455  prochlorperazine (COMPAZINE) with saline injection 5 mg  5 mg IntraVENous Q6H PRN Marlene Gin, NP   5 mg at 19 1632  
 HYDROcodone-acetaminophen (NORCO) 5-325 mg per tablet 1 Tab  1 Tab Oral Q6H PRN Marlene Gin, NP   1 Tab at 19 1624  morphine injection 2 mg  2 mg IntraVENous Q4H PRN Marlene Murray, NP      
 acetaminophen (TYLENOL) tablet 650 mg  650 mg Oral Q6H PRN Yelitza Gonzalez MD   650 mg at 19  diphenhydrAMINE (BENADRYL) capsule 25 mg  25 mg Oral Q6H PRN Yelitza Gonzalez MD   25 mg at 19 215  acetaminophen/diphenhydrAMINE (TYLENOL PM EXT STR) 500/25 mg (Patient Supplied)   Oral QHS Yelitza Gonzalez MD      
 vit C,L-At-cqxte-lutein-zeaxan (PRESERVISION AREDS-2) capsule 1 Cap (Patient Supplied)  [address] Yelitza Gonzalez MD   1 Cap at 19 8698 OBJECTIVE: 
Patient Vitals for the past 8 hrs: 
 BP Temp Pulse Resp SpO2  
19 0820 119/61 98.8 °F (37.1 °C) 91 18 98 % 19 0454     95 % 19 0453 151/68 99.4 °F (37.4 °C) 86 18 (!) 88 % Temp (24hrs), Av.9 °F (37.2 °C), Min:98.1 °F (36.7 °C), Max:99.7 °F (37.6 °C) No intake/output data recorded. Physical Exam: 
Constitutional: Well developed, frail appearing female in no acute distress, sitting in bed HEENT: Normocephalic and atraumatic. Oropharynx is clear, mucous membranes are moist. Extraocular muscles are intact. Sclerae anicteric. Skin Warm and dry. No rash noted. No erythema. No pallor. Bruising noted on BUE/R elbow and abdomen. Respiratory Lungs are clear to auscultation bilaterally without wheezes, rales or rhonchi, normal air exchange without accessory muscle use. CVS Normal rate, regular rhythm and normal S1 and S2. No murmurs, gallops, or rubs. Abdomen Soft, mildly tender across lower abd-improving, nondistended, normoactive bowel sounds. Neuro Grossly nonfocal with no obvious sensory or motor deficits. MSK Normal range of motion in general.  No edema and no tenderness. Psych Appropriate mood and affect. Labs:   
Recent Results (from the past 24 hour(s)) METABOLIC PANEL, COMPREHENSIVE Collection Time: 11/24/19  4:55 AM  
Result Value Ref Range Sodium 144 136 - 145 mmol/L Potassium 3.6 3.5 - 5.1 mmol/L Chloride 111 (H) 98 - 107 mmol/L  
 CO2 28 21 - 32 mmol/L Anion gap 5 (L) 7 - 16 mmol/L Glucose 96 65 - 100 mg/dL BUN 12 8 - 23 MG/DL Creatinine 0.49 (L) 0.6 - 1.0 MG/DL  
 GFR est AA >60 >60 ml/min/1.73m2 GFR est non-AA >60 >60 ml/min/1.73m2 Calcium 7.8 (L) 8.3 - 10.4 MG/DL Bilirubin, total 0.4 0.2 - 1.1 MG/DL  
 ALT (SGPT) 24 12 - 65 U/L  
 AST (SGOT) 13 (L) 15 - 37 U/L Alk. phosphatase 69 50 - 136 U/L Protein, total 5.2 (L) 6.3 - 8.2 g/dL Albumin 1.6 (L) 3.2 - 4.6 g/dL Globulin 3.6 (H) 2.3 - 3.5 g/dL A-G Ratio 0.4 (L) 1.2 - 3.5 MAGNESIUM Collection Time: 11/24/19  4:55 AM  
Result Value Ref Range Magnesium 1.8 1.8 - 2.4 mg/dL LD Collection Time: 11/24/19  4:55 AM  
Result Value Ref Range  (H) 110 - 210 U/L  
URIC ACID Collection Time: 11/24/19  4:55 AM  
Result Value Ref Range Uric acid 1.2 (L) 2.6 - 6.0 MG/DL  
PHOSPHORUS Collection Time: 11/24/19  4:55 AM  
Result Value Ref Range Phosphorus 3.0 2.3 - 3.7 MG/DL  
CBC WITH AUTOMATED DIFF Collection Time: 11/24/19  4:55 AM  
Result Value Ref Range WBC 0.0 (LL) 4.3 - 11.1 K/uL  
 RBC 2.16 (L) 4.05 - 5.2 M/uL HGB 6.5 (LL) 11.7 - 15.4 g/dL HCT 19.2 (LL) 35.8 - 46.3 % MCV 88.9 79.6 - 97.8 FL  
 MCH 30.1 26.1 - 32.9 PG  
 MCHC 33.9 31.4 - 35.0 g/dL  
 RDW 13.8 11.9 - 14.6 % PLATELET 6 (LL) 591 - 450 K/uL MPV 8.6 (L) 9.4 - 12.3 FL ABSOLUTE NRBC 0.00 0.0 - 0.2 K/uL  
 RBC COMMENTS SLIGHT 
ANISOCYTOSIS + POIKILOCYTOSIS 
    
 WBC COMMENTS WBC TOO FEW TO DIFFERENTIATE PLATELET COMMENTS MARKED    
 DF MANUAL    
TYPE + CROSSMATCH Collection Time: 11/24/19  6:02 AM  
Result Value Ref Range Crossmatch Expiration 11/27/2019 ABO/Rh(D) AB POSITIVE Antibody screen NEG Imaging: 
CT HEAD WO CONT [[id number]] Collected: 11/19/19 1050 Order Status: Completed Updated: 11/19/19 1054 Narrative:    
CT HEAD WITHOUT CONTRAST, 11/19/2019 History: Fall last night hitting left side of head Comparison: . Technique:   5 mm axial scans from the skull base to the vertex. All CT scans 
performed at this facility use one or all of the following: Automated exposure 
control, adjustment of the mA and/or kVp according to patient's size, iterative 
reconstruction. Findings:  No evidence of intracranial hemorrhage is seen. Faint bilateral basal 
ganglia calcifications are seen. No abnormal extra-axial fluid collections are 
seen.  Mild cortical involutional changes are seen which are not felt to be 
abnormal given the patient's age. Vara Divers evidence for acute hydrocephalus is seen. No evidence of midline shift or herniation is seen.  No abnormal edema pattern 
is seen in a vascular distribution to suggest large artery infarction. Evaluation with bone windows shows no acute osseous changes.  The visualized 
sinuses, middle ears, and mastoid air cells are well aerated. Impression:    
IMPRESSION:   
1.  No acute intracranial process evident by noncontrast CT study of the head. XR CHEST SNGL V [[id number]] Collected: 11/18/19 2041 Order Status: Completed Updated: 11/18/19 2044 Narrative:    
EXAM: XR CHEST SNGL V 
 
INDICATION: neutropenic fever COMPARISON: 9/29/2019 FINDINGS: A portable AP radiograph of the chest was obtained at 1946 hours. The 
patient is on a cardiac monitor. The lungs are clear. The cardiac and 
mediastinal contours and pulmonary vascularity are normal.  The bones and soft 
tissues are grossly within normal limits. Impression:    
IMPRESSION: Normal chest.  
XR ELBOW RT MIN 3 V [[id number]] Collected: 11/10/19 1421 Order Status: Completed Updated: 11/10/19 1424 Narrative:    
Right elbow Clinical location: Elbow pain after feeling a pop, decreased range of motion FINDINGS: Four views of the right elbow show no fracture or dislocation. There 
is no joint effusion. The soft tissues are unremarkable. Impression:    
IMPRESSION: No acute osseous abnormality or joint derangement of the right 
elbow. ASSESSMENT: 
Problem List  Date Reviewed: 10/31/2019 Codes Class Noted Febrile neutropenia (HCC) ICD-10-CM: D70.9, R50.81 ICD-9-CM: 288.00, 780.61  9/21/2019 Pancytopenia due to antineoplastic chemotherapy [facility]) ICD-10-CM: D61.810, T45.1X5A 
ICD-9-CM: 284.11, E933.1  6/12/2019 Cellulitis of neck ICD-10-CM: T26.081 ICD-9-CM: 682.1  6/12/2019 Immunocompromised status associated with infection ICD-10-CM: B99.9 ICD-9-CM: 136.9  6/12/2019 Port or reservoir infection ICD-10-CM: T59.098I ICD-9-CM: 999.33  6/12/2019 Acute myeloid leukemia not having achieved remission ([facility] ICD-10-CM: C92.00 ICD-9-CM: 205.00  5/9/2019 Admission for antineoplastic chemotherapy ICD-10-CM: Z51.11 ICD-9-CM: V58.11  5/5/2019 AML (acute myeloblastic leukemia) ([email or web address] ICD-10-CM: C92.00 ICD-9-CM: 205.00  4/28/2019 Weakness generalized ICD-10-CM: R53.1 ICD-9-CM: 780.79  4/28/2019 Pancytopenia ([email or web address] ICD-10-CM: K23.261 ICD-9-CM: 284.19  4/28/2019 Thrombocytopenia ([email or web address] ICD-10-CM: D69.6 ICD-9-CM: 287.5  4/27/2019 Ms. Zenia Jensen is a 68 y.o. female admitted on 11/7/2019. She is a known patient of Dr. 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 She failed induction with 7+3/Midostaurin. On Dacogen/Gilteritinib with recent BMbx with persistent residual disease.    She is being admitted for FLAG-VENITA. She is feeling well and is ready to proceed with treatment. PLAN: 
AML 
- admit for salvage FLAG-VENITA 
- needs Echo prior - ordered 11/8 Day 1 FLAG-VENITA. Echo with EF 55-60%, proceed with tx. 
11/9 Day 2 FLAG-VENITA. Tolerating well, no issues. Uric acid 3.1 today. 11/10 Day 3 FLAG-VENITA. No issues with treatment. Uric acid 3.3 today. 11/12 Day 5 FLAG-VENITA. Tolerating treatment well. G-CSF to start tomorrow. 11/13 Day 6 FLAG-VENITA, doing well, Granix/claritin starts today 11/19 Day 12 FLAG-VENITA. Awaiting count recovery, con't Granix. 11/20 Day 13 FLAG-VENITA. WBC 0. Continue Granix. Pancytopenia secondary to disease - Transfuse prn per [facility] SOPs 
11/19 Transfuse PRBCs today R elbow pain 11/11 c/o R elbow pain with limited ROM yesterday. Xray neg. Pain improved today, noted bruising. Normal ROM on exam. 
 
Mild Abd discomfort/loose stools 11/13 discomfort is improved from yesterday, will monitor 11/14 loose stools, but not watery, can use Imodium prn 
11/15 Imodium working well  
11/16 controlled Neutropenic fever / UTI 
11/19 Tmax 102. 1.  UA +UTI. UCx pending. BCx-NGTD. CXR neg. On Cef/Vanc. DC prophylactic LVQ. 11/20 Tmax 102. BCx positive for streptococcus/enterococcus. Subculture in progress. On Cef/Vanc.   
11/22 repeat BC NTD. Alpha strep on vanc. 11/24 Repeat BC NTD. No fever Sepsis not present on admission 11/21  BCx positive for streptococcus/enterococcus. Subculture in progress. Fall 
11/19 s/p last PM.  No LOC. Hit head. CT head neg. Double vision 11/21 Neuro has seen patient. Concerns for possible 6th nerve palsy. Recommends LP. We will hold with WBC 0.0 and continue to monitor. 11/22 vision improved today. MRI brain pending. 11/23 MRI unremarkable. Discussed with Neuro. No need for LP Continue home meds Prophylactic Antibx: Acyclovir, Voriconazole [facility] SOPs SCDs for DVT prophylaxis (AC contraindicated d/t thrombocytopenia) Goals and plan of care reviewed with the patient. All questions answered to the best of our ability. Disposition:  Awaiting count recovery. Will need BMbx prior to discharge but not prior to day 28 chemo. Upon discharge will need twice weekly labs w transfusions as needed. Weekly provider visits. RTC within 1 week from discharge. Yamileth Mock NP [facility] Hematology & Oncology [address] [place] [address] Office : [phone] Fax : [phone] Attending Addendum: 
I have personally performed a face to face diagnostic evaluation on this patient. I have reviewed and agree with the care plan as documented by Yamileth Mock N.P. 36 minutes were spent on patient care, including but not limited to, reviewing the chart and time with the patient and family, more than 50% of the time documented was spent in face-to-face contact with the patient and in the care of the patient on the floor/unit where the patient is located. My findings are as follows: She has AML, appears anxious, heart rate regular without murmurs, abdomen is non-tender, bowel sounds are positive, we will continue IV ABX and G-CSF, we will also transfuse PRBCs and Platelets today. Mame Carlos MD 
 
 
[facility] Hematology/Oncology [address] [place] [address] Office : [phone] Fax : [phone]

## 2019-11-24 NOTE — PROGRESS NOTES
END OF SHIFT NOTE: 
 
Intake/Output 11/23 1901 - 11/24 0700 In: 660 [I.V.:660] Out: 1300 [Urine:1300] Voiding: YES Catheter: NO 
Drain:   
 
 
 
 
 
Stool:  1 occurrences. Stool Assessment Stool Color: Jessica Medicine (11/24/19 0518) Stool Appearance: Soft;Loose (11/24/19 0518) Stool Amount: Small (11/24/19 0518) Stool Source/Status: Rectum (11/24/19 0518) Emesis:  0 occurrences. VITAL SIGNS Patient Vitals for the past 12 hrs: 
 Temp Pulse Resp BP SpO2  
11/24/19 0454     95 % 11/24/19 0453 99.4 °F (37.4 °C) 86 18 151/68 (!) 88 % 11/23/19 2239 98.4 °F (36.9 °C) 97 18 154/65 90 % 11/23/19 1855 99.7 °F (37.6 °C) (!) 111 20 182/83 90 % Pain Assessment Pain 1 Pain Scale 1: Visual (11/24/19 0225) Pain Intensity 1: 0 (11/24/19 0225) Patient Stated Pain Goal: 0 (11/23/19 1926) Pain Reassessment 1: Patient resting w/respiratory rate greater than 10 (11/24/19 0225) Pain Onset 1: suddenly (11/23/19 1007) Pain Location 1: Abdomen (11/23/19 1007) Pain Orientation 1: Left;Right (11/23/19 1007) Pain Description 1: Cramping (11/23/19 1007) Pain Intervention(s) 1: Medication (see MAR); Other (comment)(had a BM) (11/23/19 1007) Ambulating Yes Additional Information: Pt to receive PRBC's and plt's today. Afebrile. Pt currently on 1.5 L NC due to low sats during night. Shift report given to oncoming nurse at the bedside.  
 
Rashid Jimenez RN

## 2019-11-24 NOTE — ROUTINE PROCESS
Blood bank called needed permission to use not pts type of platelets no AB+ available Spoke with np ok to give other types but has to be leuco reduced CMV negative and irradiated. [facility] in the BB made aware.

## 2019-11-25 LAB
ALBUMIN SERPL-MCNC: 1.8 G/DL (ref 3.2–4.6)
ALBUMIN/GLOB SERPL: 0.5 {RATIO} (ref 1.2–3.5)
ALP SERPL-CCNC: 66 U/L (ref 50–136)
ALT SERPL-CCNC: 27 U/L (ref 12–65)
ANION GAP SERPL CALC-SCNC: 5 MMOL/L (ref 7–16)
AST SERPL-CCNC: 16 U/L (ref 15–37)
BACTERIA SPEC CULT: NORMAL
BASOPHILS # BLD: 0 K/UL (ref 0–0.2)
BASOPHILS NFR BLD: 0 % (ref 0–2)
BILIRUB SERPL-MCNC: 0.4 MG/DL (ref 0.2–1.1)
BLD PROD TYP BPU: NORMAL
BLOOD BANK CMNT PATIENT-IMP: NORMAL
BPU ID: NORMAL
BUN SERPL-MCNC: 14 MG/DL (ref 8–23)
CALCIUM SERPL-MCNC: 8.1 MG/DL (ref 8.3–10.4)
CHLORIDE SERPL-SCNC: 111 MMOL/L (ref 98–107)
CO2 SERPL-SCNC: 29 MMOL/L (ref 21–32)
CREAT SERPL-MCNC: 0.51 MG/DL (ref 0.6–1)
DIFFERENTIAL METHOD BLD: ABNORMAL
EOSINOPHIL # BLD: 0 K/UL (ref 0–0.8)
EOSINOPHIL NFR BLD: 0 % (ref 0.5–7.8)
ERYTHROCYTE [DISTWIDTH] IN BLOOD BY AUTOMATED COUNT: 13.4 % (ref 11.9–14.6)
GLOBULIN SER CALC-MCNC: 3.6 G/DL (ref 2.3–3.5)
GLUCOSE SERPL-MCNC: 88 MG/DL (ref 65–100)
HCT VFR BLD AUTO: 20.7 % (ref 35.8–46.3)
HGB BLD-MCNC: 6.9 G/DL (ref 11.7–15.4)
IMM GRANULOCYTES # BLD AUTO: 0 K/UL (ref 0–0.5)
IMM GRANULOCYTES NFR BLD AUTO: 0 % (ref 0–5)
LDH SERPL L TO P-CCNC: 246 U/L (ref 110–210)
LYMPHOCYTES # BLD: 0 K/UL (ref 0.5–4.6)
LYMPHOCYTES NFR BLD: 0 % (ref 13–44)
MAGNESIUM SERPL-MCNC: 1.8 MG/DL (ref 1.8–2.4)
MCH RBC QN AUTO: 29.6 PG (ref 26.1–32.9)
MCHC RBC AUTO-ENTMCNC: 33.3 G/DL (ref 31.4–35)
MCV RBC AUTO: 88.8 FL (ref 79.6–97.8)
MONOCYTES # BLD: 0 K/UL (ref 0.1–1.3)
MONOCYTES NFR BLD: 0 % (ref 4–12)
NEUTS SEG # BLD: 0 K/UL (ref 1.7–8.2)
NEUTS SEG NFR BLD: 100 % (ref 43–78)
NRBC # BLD: 0 K/UL (ref 0–0.2)
PHOSPHATE SERPL-MCNC: 3.5 MG/DL (ref 2.3–3.7)
PLATELET # BLD AUTO: 34 K/UL (ref 150–450)
PMV BLD AUTO: 10.5 FL (ref 9.4–12.3)
POTASSIUM SERPL-SCNC: 3.8 MMOL/L (ref 3.5–5.1)
PROT SERPL-MCNC: 5.4 G/DL (ref 6.3–8.2)
RBC # BLD AUTO: 2.33 M/UL (ref 4.05–5.2)
SERVICE CMNT-IMP: NORMAL
SODIUM SERPL-SCNC: 145 MMOL/L (ref 136–145)
STATUS OF UNIT,%ST: NORMAL
UNIT DIVISION, %UDIV: 0
URATE SERPL-MCNC: 1.5 MG/DL (ref 2.6–6)
WBC # BLD AUTO: 0 K/UL (ref 4.3–11.1)

## 2019-11-25 PROCEDURE — 74011250637 HC RX REV CODE- 250/637: Performed by: NURSE PRACTITIONER

## 2019-11-25 PROCEDURE — 85025 COMPLETE CBC W/AUTO DIFF WBC: CPT

## 2019-11-25 PROCEDURE — 84550 ASSAY OF BLOOD/URIC ACID: CPT

## 2019-11-25 PROCEDURE — 65270000029 HC RM PRIVATE

## 2019-11-25 PROCEDURE — 99233 SBSQ HOSP IP/OBS HIGH 50: CPT | Performed by: INTERNAL MEDICINE

## 2019-11-25 PROCEDURE — P9040 RBC LEUKOREDUCED IRRADIATED: HCPCS

## 2019-11-25 PROCEDURE — 80053 COMPREHEN METABOLIC PANEL: CPT

## 2019-11-25 PROCEDURE — 74011250637 HC RX REV CODE- 250/637: Performed by: INTERNAL MEDICINE

## 2019-11-25 PROCEDURE — 74011000258 HC RX REV CODE- 258: Performed by: INTERNAL MEDICINE

## 2019-11-25 PROCEDURE — 65270000015 HC RM PRIVATE ONCOLOGY

## 2019-11-25 PROCEDURE — 84100 ASSAY OF PHOSPHORUS: CPT

## 2019-11-25 PROCEDURE — 74011250636 HC RX REV CODE- 250/636: Performed by: NURSE PRACTITIONER

## 2019-11-25 PROCEDURE — 74011250636 HC RX REV CODE- 250/636: Performed by: INTERNAL MEDICINE

## 2019-11-25 PROCEDURE — 36430 TRANSFUSION BLD/BLD COMPNT: CPT

## 2019-11-25 PROCEDURE — 83615 LACTATE (LD) (LDH) ENZYME: CPT

## 2019-11-25 PROCEDURE — 83735 ASSAY OF MAGNESIUM: CPT

## 2019-11-25 PROCEDURE — 86644 CMV ANTIBODY: CPT

## 2019-11-25 RX ORDER — SODIUM CHLORIDE 9 MG/ML
250 INJECTION, SOLUTION INTRAVENOUS AS NEEDED
Status: DISCONTINUED | OUTPATIENT
Start: 2019-11-25 | End: 2019-11-29

## 2019-11-25 RX ADMIN — LOPERAMIDE HYDROCHLORIDE 2 MG: 2 CAPSULE ORAL at 22:00

## 2019-11-25 RX ADMIN — DULOXETINE 30 MG: 30 CAPSULE, DELAYED RELEASE ORAL at 08:46

## 2019-11-25 RX ADMIN — LORATADINE 10 MG: 10 TABLET ORAL at 08:45

## 2019-11-25 RX ADMIN — ACYCLOVIR 400 MG: 800 TABLET ORAL at 17:00

## 2019-11-25 RX ADMIN — CAMPHOR AND MENTHOL: 5; 5 LOTION TOPICAL at 17:00

## 2019-11-25 RX ADMIN — ACYCLOVIR 400 MG: 800 TABLET ORAL at 08:46

## 2019-11-25 RX ADMIN — DIPHENHYDRAMINE HYDROCHLORIDE 25 MG: 25 CAPSULE ORAL at 11:44

## 2019-11-25 RX ADMIN — ACETAMINOPHEN 650 MG: 325 TABLET, FILM COATED ORAL at 11:44

## 2019-11-25 RX ADMIN — LORAZEPAM 1 MG: 1 TABLET ORAL at 21:41

## 2019-11-25 RX ADMIN — VANCOMYCIN HYDROCHLORIDE 1750 MG: 10 INJECTION, POWDER, LYOPHILIZED, FOR SOLUTION INTRAVENOUS at 04:34

## 2019-11-25 RX ADMIN — POTASSIUM CHLORIDE 20 MEQ: 20 TABLET, EXTENDED RELEASE ORAL at 08:45

## 2019-11-25 RX ADMIN — CEFEPIME 2 G: 2 INJECTION, POWDER, FOR SOLUTION INTRAVENOUS at 00:29

## 2019-11-25 RX ADMIN — VORICONAZOLE 200 MG: 200 TABLET, FILM COATED ORAL at 08:45

## 2019-11-25 RX ADMIN — DIPHENHYDRAMINE HYDROCHLORIDE 25 MG: 25 CAPSULE ORAL at 21:40

## 2019-11-25 RX ADMIN — ONDANSETRON 4 MG: 2 INJECTION INTRAMUSCULAR; INTRAVENOUS at 11:53

## 2019-11-25 RX ADMIN — CEFEPIME 2 G: 2 INJECTION, POWDER, FOR SOLUTION INTRAVENOUS at 16:59

## 2019-11-25 RX ADMIN — CEFEPIME 2 G: 2 INJECTION, POWDER, FOR SOLUTION INTRAVENOUS at 08:45

## 2019-11-25 RX ADMIN — PIPERACILLIN AND TAZOBACTAM 3.38 G: 3; .375 INJECTION, POWDER, FOR SOLUTION INTRAVENOUS at 18:12

## 2019-11-25 RX ADMIN — POTASSIUM CHLORIDE 20 MEQ: 20 TABLET, EXTENDED RELEASE ORAL at 17:00

## 2019-11-25 RX ADMIN — LOPERAMIDE HYDROCHLORIDE 2 MG: 2 CAPSULE ORAL at 18:19

## 2019-11-25 RX ADMIN — TBO-FILGRASTIM 480 MCG: 480 INJECTION, SOLUTION SUBCUTANEOUS at 21:40

## 2019-11-25 RX ADMIN — VORICONAZOLE 200 MG: 200 TABLET, FILM COATED ORAL at 21:41

## 2019-11-25 RX ADMIN — ALLOPURINOL 300 MG: 300 TABLET ORAL at 08:45

## 2019-11-25 RX ADMIN — CAMPHOR AND MENTHOL: 5; 5 LOTION TOPICAL at 21:45

## 2019-11-25 RX ADMIN — PRAVASTATIN SODIUM 10 MG: 20 TABLET ORAL at 21:41

## 2019-11-25 RX ADMIN — Medication 2 TABLET: at 08:46

## 2019-11-25 NOTE — PROGRESS NOTES
[facility] Hematology & Oncology Inpatient Hematology / Oncology Daily Progress Note Reason for Admission:  Admission for antineoplastic chemotherapy [Z51.11] 24 Hour Events: 
BCx- + enterococcus/streptococcus Repeat BC NTD Skin rash ? RT vanc-ID consulted Day 18 FLAG-VENITA Awaiting count recovery, on Granix ROS: 
Constitutional: +fatigue -fever CV: Negative for chest pain, palpitations, edema. Respiratory: Negative for dyspnea, cough, wheezing. GI: Negative for nausea, abdominal pain. 10 point review of systems is otherwise negative with the exception of the elements mentioned above in the HPI. No Known Allergies Past Medical History:  
Diagnosis Date  AML (acute myeloid leukemia) ([facility] [address] dx- 4/2019  
 followed by dr Tawny Chavez  Depression  Hypercholesterolemia  Infection   
 of port -- was placed 5/2019-- removed 6/2019---right chest  
 Psychiatric disorder   
 aniexty  Sleep apnea Past Surgical History:  
Procedure Laterality Date  HX OTHER SURGICAL    
 colonoscopy  HX VASCULAR ACCESS    
 IR INSERT TUNL CVC W PORT OVER 5 YEARS  4/30/2019  IR INSERT TUNL CVC W PORT OVER 5 YEARS  7/15/2019  IR REMOVE TUNL CVAD W PORT/PUMP  6/13/2019 Family History Problem Relation Age of Onset  Cancer Father Social History Socioeconomic History  Marital status:  Spouse name: Not on file  Number of children: Not on file  Years of education: Not on file  Highest education level: Not on file Occupational History  Not on file Social Needs  Financial resource strain: Not on file  Food insecurity:  
  Worry: Not on file Inability: Not on file  Transportation needs:  
  Medical: Not on file Non-medical: Not on file Tobacco Use  Smoking status: Never Smoker  Smokeless tobacco: Never Used Substance and Sexual Activity  Alcohol use: Never Frequency: Never  Drug use: Never  Sexual activity: Not on file Lifestyle  Physical activity:  
  Days per week: Not on file Minutes per session: Not on file  Stress: Not on file Relationships  Social connections:  
  Talks on phone: Not on file Gets together: Not on file Attends Gnosticist service: Not on file Active member of club or organization: Not on file Attends meetings of clubs or organizations: Not on file Relationship status: Not on file  Intimate partner violence:  
  Fear of current or ex partner: Not on file Emotionally abused: Not on file Physically abused: Not on file Forced sexual activity: Not on file Other Topics Concern [address] Service Not Asked  Blood Transfusions Not Asked  Caffeine Concern Not Asked  Occupational Exposure Not Asked Rodrigo Huddle Hazards Not Asked  Sleep Concern Not Asked  Stress Concern Not Asked  Weight Concern Not Asked  Special Diet Not Asked  Back Care Not Asked  Exercise Not Asked  Bike Helmet Not Asked  Seat Belt Not Asked  Self-Exams Not Asked Social History Narrative  Not on file Current Facility-Administered Medications Medication Dose Route Frequency Provider Last Rate Last Dose  
 0.9% sodium chloride infusion 250 mL  250 mL IntraVENous PRN Ubaldo Christina MD      
 camphor-menthol [facility]) 0.5-0.5 % lotion   Topical TID Terence Oats, NP      
 0.9% sodium chloride infusion 250 mL  250 mL IntraVENous PRN Ubaldo Christina MD      
 VANCOMYCIN INFORMATION NOTE   Other Siena Valerio MD      
 vancomycin [facility]) 1750 mg in  ml infusion  1,750 mg IntraVENous Q12H Ubaldo Christina  mL/hr at 11/25/19 0434 1,750 mg at 11/25/19 032 433 92 68  sodium chloride 0.9 % bolus infusion 500 mL  500 mL IntraVENous QHS Terence Oats, NP   500 mL at 11/21/19 2315  baclofen (LIORESAL) tablet 5 mg  5 mg Oral Q8H PRN Ubaldo Christina MD   5 mg at 11/21/19 1335  potassium chloride (K-DUR, KLOR-CON) SR tablet 20 mEq  20 mEq Oral BID Lily Lopez MD   20 mEq at 11/25/19 0499  albuterol (PROVENTIL VENTOLIN) nebulizer solution 2.5 mg  2.5 mg Nebulization Q6H PRN Christine Jay NP   2.5 mg at 11/21/19 1600  
 alum-mag hydroxide-simeth (MYLANTA) oral suspension 30 mL  30 mL Oral Q4H PRN Lily Lopez MD   30 mL at 11/21/19 9558  cefepime (MAXIPIME) 2 g in 0.9% sodium chloride (MBP/ADV) 100 mL  2 g IntraVENous Q8H Lily Lopez  mL/hr at 11/25/19 0845 2 g at 11/25/19 0845  loperamide (IMODIUM) capsule 2 mg  2 mg Oral Q4H PRN Lily Lopez MD   2 mg at 11/24/19 1000  loratadine (CLARITIN) tablet 10 mg  10 mg Oral DAILY Mohsen Blue NP   10 mg at 11/25/19 8292  central line flush (saline) syringe 10 mL  10 mL InterCATHeter PRN Lily Lopez MD   10 mL at 11/18/19 2110  
 docusate sodium (COLACE) capsule 100 mg  100 mg Oral BID PRN Darby Nunez NP   100 mg at 11/11/19 1308  LORazepam (ATIVAN) injection 0.5 mg  0.5 mg IntraVENous Q6H PRN Lily Lopez MD   0.5 mg at 11/17/19 0546  
 saline peripheral flush soln 10 mL  10 mL InterCATHeter PRN Lily Lopez MD   10 mL at 11/23/19 2229  
 heparin (porcine) pf 300-500 Units  300-500 Units InterCATHeter PRN Lily Lopez MD      
 tbo-filgrastim [facility]) injection 480 mcg  480 mcg SubCUTAneous QHS Lily Lopez MD   480 mcg at 11/24/19 2134  
 acyclovir (ZOVIRAX) tablet 400 mg  400 mg Oral BID Darby Nunez NP   400 mg at 11/25/19 0846  
 allopurinol (ZYLOPRIM) tablet 300 mg  300 mg Oral DAILY Darby Nunez NP   300 mg at 11/25/19 3662  cholecalciferol (VITAMIN D3) (400 Units /10 mcg) tablet 2 Tab  800 Units Oral DAILY Darby Nunez NP   2 Tab at 11/25/19 1084  DULoxetine (CYMBALTA) capsule 30 mg  30 mg Oral DAILY Darby Nunez NP   30 mg at 11/25/19 9732  lidocaine-prilocaine (EMLA) 2.5-2.5 % cream   Topical PRN Darby Nunez NP      
  LORazepam (ATIVAN) tablet 1 mg  1 mg Oral QHS Ruchi Verae, NP   1 mg at 19  pravastatin (PRAVACHOL) tablet 10 mg  10 mg Oral QHS Ruchi Shine, NP   10 mg at 19  voriconazole (VFEND) tablet 200 mg  200 mg Oral Q12H Ruchi Verae, NP   200 mg at 19 2564  ondansetron (ZOFRAN) injection 4 mg  4 mg IntraVENous Q4H PRN Ruchi Manning, NP   4 mg at 19 1153  prochlorperazine (COMPAZINE) with saline injection 5 mg  5 mg IntraVENous Q6H PRN Ruchi Manning, NP   5 mg at 19 1632  
 HYDROcodone-acetaminophen (NORCO) 5-325 mg per tablet 1 Tab  1 Tab Oral Q6H PRN Ruchi Manning, NP   1 Tab at 19 0870  morphine injection 2 mg  2 mg IntraVENous Q4H PRN Ruchi Manning, NP      
 acetaminophen (TYLENOL) tablet 650 mg  650 mg Oral Q6H PRN Tenzin Yap MD   650 mg at 19 1144  diphenhydrAMINE (BENADRYL) capsule 25 mg  25 mg Oral Q6H PRN Tenzin Yap MD   25 mg at 19 1144  acetaminophen/diphenhydrAMINE (TYLENOL PM EXT STR) 500/25 mg (Patient Supplied)   Oral QHS Tenzin Yap MD      
 vit C,L-Ai-srhzx-lutein-zeaxan (PRESERVISION AREDS-2) capsule 1 Cap (Patient Supplied)  [address] Tenzin Yap MD   1 Cap at 19 6436 OBJECTIVE: 
Patient Vitals for the past 8 hrs: 
 BP Temp Pulse Resp SpO2  
19 1406 153/61 98.1 °F (36.7 °C) 83 18 92 % 19 1245 137/60 99.1 °F (37.3 °C) 84 18 91 % 19 1230 146/59 99 °F (37.2 °C) 87 18 (!) 89 % 19 1105 144/65 98.7 °F (37.1 °C) 94 16 90 % 19 0747 152/63 98.9 °F (37.2 °C) 100 18 95 % Temp (24hrs), Av.2 °F (37.3 °C), Min:98.1 °F (36.7 °C), Max:99.9 °F (37.7 °C) 
 
701 - 1900 In: 936 [P.O.:595] Out: 500 [Urine:500] Physical Exam: 
Constitutional: Well developed, frail appearing female in no acute distress, sitting in bed HEENT: Normocephalic and atraumatic. Oropharynx is clear, mucous membranes are moist. Extraocular muscles are intact. Sclerae anicteric. Skin Warm and dry. Scattered rash. No erythema. No pallor. Bruising noted on BUE/R elbow and abdomen. Respiratory Lungs are clear to auscultation bilaterally without wheezes, rales or rhonchi, normal air exchange without accessory muscle use. CVS Normal rate, regular rhythm and normal S1 and S2. No murmurs, gallops, or rubs. Abdomen Soft, mildly tender across lower abd-improving, nondistended, normoactive bowel sounds. Neuro Grossly nonfocal with no obvious sensory or motor deficits. MSK Normal range of motion in general.  No edema and no tenderness. Psych Appropriate mood and affect. Labs:   
Recent Results (from the past 24 hour(s)) METABOLIC PANEL, COMPREHENSIVE Collection Time: 11/25/19  4:35 AM  
Result Value Ref Range Sodium 145 136 - 145 mmol/L Potassium 3.8 3.5 - 5.1 mmol/L Chloride 111 (H) 98 - 107 mmol/L  
 CO2 29 21 - 32 mmol/L Anion gap 5 (L) 7 - 16 mmol/L Glucose 88 65 - 100 mg/dL BUN 14 8 - 23 MG/DL Creatinine 0.51 (L) 0.6 - 1.0 MG/DL  
 GFR est AA >60 >60 ml/min/1.73m2 GFR est non-AA >60 >60 ml/min/1.73m2 Calcium 8.1 (L) 8.3 - 10.4 MG/DL Bilirubin, total 0.4 0.2 - 1.1 MG/DL  
 ALT (SGPT) 27 12 - 65 U/L  
 AST (SGOT) 16 15 - 37 U/L Alk. phosphatase 66 50 - 136 U/L Protein, total 5.4 (L) 6.3 - 8.2 g/dL Albumin 1.8 (L) 3.2 - 4.6 g/dL Globulin 3.6 (H) 2.3 - 3.5 g/dL A-G Ratio 0.5 (L) 1.2 - 3.5 MAGNESIUM Collection Time: 11/25/19  4:35 AM  
Result Value Ref Range Magnesium 1.8 1.8 - 2.4 mg/dL LD Collection Time: 11/25/19  4:35 AM  
Result Value Ref Range  (H) 110 - 210 U/L  
URIC ACID Collection Time: 11/25/19  4:35 AM  
Result Value Ref Range Uric acid 1.5 (L) 2.6 - 6.0 MG/DL  
PHOSPHORUS Collection Time: 11/25/19  4:35 AM  
Result Value Ref Range Phosphorus 3.5 2.3 - 3.7 MG/DL  
CBC WITH AUTOMATED DIFF Collection Time: 11/25/19  4:35 AM  
Result Value Ref Range WBC 0.0 (LL) 4.3 - 11.1 K/uL  
 RBC 2.33 (L) 4.05 - 5.2 M/uL HGB 6.9 (LL) 11.7 - 15.4 g/dL HCT 20.7 (LL) 35.8 - 46.3 % MCV 88.8 79.6 - 97.8 FL  
 MCH 29.6 26.1 - 32.9 PG  
 MCHC 33.3 31.4 - 35.0 g/dL  
 RDW 13.4 11.9 - 14.6 % PLATELET 34 (L) 060 - 450 K/uL MPV 10.5 9.4 - 12.3 FL ABSOLUTE NRBC 0.00 0.0 - 0.2 K/uL  
 DF AUTOMATED NEUTROPHILS 100 (H) 43 - 78 % LYMPHOCYTES 0 (L) 13 - 44 % MONOCYTES 0 (L) 4.0 - 12.0 % EOSINOPHILS 0 (L) 0.5 - 7.8 % BASOPHILS 0 0.0 - 2.0 % IMMATURE GRANULOCYTES 0 0.0 - 5.0 %  
 ABS. NEUTROPHILS 0.0 (L) 1.7 - 8.2 K/UL  
 ABS. LYMPHOCYTES 0.0 (L) 0.5 - 4.6 K/UL  
 ABS. MONOCYTES 0.0 (L) 0.1 - 1.3 K/UL  
 ABS. EOSINOPHILS 0.0 0.0 - 0.8 K/UL  
 ABS. BASOPHILS 0.0 0.0 - 0.2 K/UL  
 ABS. IMM. GRANS. 0.0 0.0 - 0.5 K/UL Imaging: 
CT HEAD WO CONT [[id number]] Collected: 11/19/19 1050 Order Status: Completed Updated: 11/19/19 1054 Narrative:    
CT HEAD WITHOUT CONTRAST, 11/19/2019 History: Fall last night hitting left side of head Comparison: . Technique:   5 mm axial scans from the skull base to the vertex. All CT scans 
performed at this facility use one or all of the following: Automated exposure 
control, adjustment of the mA and/or kVp according to patient's size, iterative 
reconstruction. Findings:  No evidence of intracranial hemorrhage is seen. Faint bilateral basal 
ganglia calcifications are seen. No abnormal extra-axial fluid collections are 
seen.  Mild cortical involutional changes are seen which are not felt to be 
abnormal given the patient's age. Vara Divers evidence for acute hydrocephalus is seen. No evidence of midline shift or herniation is seen.  No abnormal edema pattern 
is seen in a vascular distribution to suggest large artery infarction. Evaluation with bone windows shows no acute osseous changes.  The visualized 
sinuses, middle ears, and mastoid air cells are well aerated.   
Impression:    
IMPRESSION:   
 1.  No acute intracranial process evident by noncontrast CT study of the head. XR CHEST SNGL V [[id number]] Collected: 11/18/19 2041 Order Status: Completed Updated: 11/18/19 2044 Narrative:    
EXAM: XR CHEST SNGL V 
 
INDICATION: neutropenic fever COMPARISON: 9/29/2019 FINDINGS: A portable AP radiograph of the chest was obtained at 1946 hours. The 
patient is on a cardiac monitor. The lungs are clear. The cardiac and 
mediastinal contours and pulmonary vascularity are normal.  The bones and soft 
tissues are grossly within normal limits. Impression:    
IMPRESSION: Normal chest.  
XR ELBOW RT MIN 3 V [[id number]] Collected: 11/10/19 1421 Order Status: Completed Updated: 11/10/19 1424 Narrative:    
Right elbow Clinical location: Elbow pain after feeling a pop, decreased range of motion FINDINGS: Four views of the right elbow show no fracture or dislocation. There 
is no joint effusion. The soft tissues are unremarkable. Impression:    
IMPRESSION: No acute osseous abnormality or joint derangement of the right 
elbow. ASSESSMENT: 
Problem List  Date Reviewed: 10/31/2019 Codes Class Noted Febrile neutropenia (HCC) ICD-10-CM: D70.9, R50.81 ICD-9-CM: 288.00, 780.61  9/21/2019 Pancytopenia due to antineoplastic chemotherapy [facility]) ICD-10-CM: D61.810, T45.1X5A 
ICD-9-CM: 284.11, E933.1  6/12/2019 Cellulitis of neck ICD-10-CM: C56.329 ICD-9-CM: 682.1  6/12/2019 Immunocompromised status associated with infection ICD-10-CM: B99.9 ICD-9-CM: 136.9  6/12/2019 Port or reservoir infection ICD-10-CM: F03.077F ICD-9-CM: 999.33  6/12/2019 Acute myeloid leukemia not having achieved remission ([facility] ICD-10-CM: C92.00 ICD-9-CM: 205.00  5/9/2019 Admission for antineoplastic chemotherapy ICD-10-CM: Z51.11 ICD-9-CM: V58.11  5/5/2019 [facility] myel[facility] ICD-10-CM: C92.00 ICD-9-CM: 205.00  4/28/2019 Weakness generalized ICD-10-CM: R53.1 ICD-9-CM: 780.79  4/28/2019 Pancytopenia ([facility] ICD-10-CM: G99.232 ICD-9-CM: 284.19  4/28/2019 Thrombocytopenia ([facility] ICD-10-CM: D69.6 ICD-9-CM: 287.5  4/27/2019 Ms. Derrel Carrel is a 68 y.o. female admitted on 11/7/2019. She is a known patient of Dr. 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 She failed induction with 7+3/Midostaurin. On Dacogen/Gilteritinib with recent BMbx with persistent residual disease. She is being admitted for FLAG-VENITA. She is feeling well and is ready to proceed with treatment. PLAN: 
AML 
- admit for salvage FLAG-VENITA 
- needs Echo prior - ordered 11/8 Day 1 FLAG-VENITA. Echo with EF 55-60%, proceed with tx. 
11/9 Day 2 FLAG-VENITA. Tolerating well, no issues. Uric acid 3.1 today. 11/10 Day 3 FLAG-VENITA. No issues with treatment. Uric acid 3.3 today. 11/12 Day 5 FLAG-VENITA. Tolerating treatment well. G-CSF to start tomorrow. 11/13 Day 6 FLAG-VENITA, doing well, Granix/claritin starts today 11/19 Day 12 FLAG-VENITA. Awaiting count recovery, con't Granix. 11/20 Day 13 FLAG-VENITA. WBC 0. Continue Granix. Pancytopenia secondary to disease - Transfuse prn per [facility] SOPs 
11/19 Transfuse PRBCs today R elbow pain 11/11 c/o R elbow pain with limited ROM yesterday. Xray neg. Pain improved today, noted bruising. Normal ROM on exam. 
 
Mild Abd discomfort/loose stools 11/13 discomfort is improved from yesterday, will monitor 11/14 loose stools, but not watery, can use Imodium prn 
11/15 Imodium working well  
11/16 controlled Neutropenic fever / UTI 
11/19 Tmax 102. 1.  UA +UTI. UCx pending. BCx-NGTD. CXR neg. On Cef/Vanc. DC prophylactic LVQ. 11/20 Tmax 102. BCx positive for streptococcus/enterococcus. Subculture in progress. On Cef/Vanc.   
11/22 repeat BC NTD. Alpha strep on vanc. 11/24 Repeat BC NTD. No fever. 11/25 new skin rash. ?RT to vanc. Consult ID for recommendations. Sepsis not present on admission 11/21  BCx positive for streptococcus/enterococcus. Fall 
11/19 s/p last PM.  No LOC. Hit head. CT head neg. Double vision 11/21 Neuro has seen patient. Concerns for possible 6th nerve palsy. Recommends LP. We will hold with WBC 0.0 and continue to monitor. 11/22 vision improved today. MRI brain pending. 11/23 MRI unremarkable. Discussed with Neuro. No need for LP Continue home meds Prophylactic Antibx: Acyclovir, Voriconazole [facility] SOPs SCDs for DVT prophylaxis (AC contraindicated d/t thrombocytopenia) Goals and plan of care reviewed with the patient. All questions answered to the best of our ability. Disposition:  Awaiting count recovery. Will need BMbx prior to discharge but not prior to day 28 chemo. Upon discharge will need twice weekly labs w transfusions as needed. Weekly provider visits. RTC within 1 week from discharge. Rylee Baron NP [facility] Hematology [facility] Oncology [id number] [place] [address] Office : [phone] Fax : [phone] I personally saw, exammed and counselled the patient, and discussed with NP, agree with above history/assessment/plan. 68 y. o.female AML with FLT3 mut s/p multiple lines of chemo and Gilteritinib but refractory, received salvage FLAG-VENITA, day 18, strep A and enterococcus sepsis controlled, ANC still 0, rash likely related to antibiotics, give antihistamine and consider alternative to vanc if rash worsens, ANC 0, supportive care per SOP. Shirin Giraldo M.D. [facility] [id number] [place] [address] Office : [phone] Fax : [phone]

## 2019-11-25 NOTE — PROGRESS NOTES
Ms. Carrasco Ra was received sitting in the window seat next to her . She tells me she has already walked with her  x 2 this morning. At this time she does not require further skilled PT. Encouraged her to keep moving and ask to have us come back should her progress slow.  
Lary Mccormack, PT

## 2019-11-25 NOTE — CONSULTS
Infectious Disease Consult Today's Date: 11/25/2019 Admit Date: 11/7/2019 Impression: · E faecalis bacteremia/Alpha strep (11/18); repeat blood cx (11/20) NG ; source Port vs GI 
· AML; admitted for salvage FLAG-VENITA · Febrile neutropenia · Pancytopenia · R elbow pain · S/P fall 11/19; CT head (-) · Double vision; Neuro has seen; ? 6th nerve palsy; symptoms improved nowt · Rash; I don't think this is a drug rash Plan: · Discontinue Vancomycin and Cefepime · Start Zosyn to treat Enterococcus /strep bacteremia · Check TTE · Consider port removal if blood remain positive · ID will continue to follow Anti-infectives: · Arnaldo Ly · ACV 
· IV Vancomycin (11/18- 
· IV Cefepime (11/18- 
· PO LVQ (11/17-11/18) Subjective:  
Date of Consultation:  November 25, 2019 Referring Physician: MADELINE Villareal NP Patient is a 68 y.o. female  admitted on 11/7/2019. She is a known patient of Dr. 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 She failed induction with 7+3/Midostaurin. On Dacogen/Gilteritinib with recent BMbx with persistent residual disease. She was admitted for FLAG-VENITA. Blood cx grew Enterococcus/ Alpha strep (11/18). Repeat blood cx NGTD. Started on IV Vancomycin and Cefepime. Now with rash. ID is asked to evaluate patient for possible Vancomycin allergy and alternative therapy. Patient Active Problem List  
Diagnosis Code  Thrombocytopenia ([address] D69.6  AML (acute myeloblastic leukemia) (HCC) C92.00  Weakness generalized R53.1  Pancytopenia ([address] X95.108  Admission for antineoplastic chemotherapy Z51.11  
 Acute myeloid leukemia not having achieved remission ([address] C92.00  Pancytopenia due to antineoplastic chemotherapy ([address] D61.810, T45.1X5A  
 Cellulitis of neck L03.221  Immunocompromised status associated with infection B99.9  Port or reservoir infection G61.255V  Febrile neutropenia (HCC) D70.9, R50.81 Past Medical History: Diagnosis Date  AML (acute myeloid leukemia) ([facility] dx- 4/2019  
 followed by dr Rose Turpin  Depression  Hypercholesterolemia  Infection   
 of port -- was placed 5/2019-- removed 6/2019---right chest  
 Psychiatric disorder   
 aniexty  Sleep apnea Family History Problem Relation Age of Onset  Cancer Father Social History Tobacco Use  Smoking status: Never Smoker  Smokeless tobacco: Never Used Substance Use Topics  Alcohol use: Never Frequency: Never Past Surgical History:  
Procedure Laterality Date  HX OTHER SURGICAL    
 colonoscopy  HX VASCULAR ACCESS    
 IR INSERT TUNL CVC W PORT OVER 5 YEARS  4/30/2019  IR INSERT TUNL CVC W PORT OVER 5 YEARS  7/15/2019  IR REMOVE TUNL CVAD W PORT/PUMP  6/13/2019 Prior to Admission medications Medication Sig Start Date End Date Taking? Authorizing Provider  
voriconazole (VFEND) 200 mg tablet Take 1 Tab by mouth every twelve (12) hours. 10/10/19  Yes Ede Russell NP  
DULoxetine (CYMBALTA) 30 mg capsule Take 1 Cap by mouth daily. 8/5/19  Yes Dominique Ochoa MD  
zolpidem (AMBIEN) 5 mg tablet Take 1 Tab by mouth nightly as needed for Sleep. Max Daily Amount: 5 mg. 8/1/19  Yes Ollie Tran NP  
cholecalciferol (VITAMIN D3) 400 unit tab tablet Take 2 Tabs by mouth daily. 6/7/19  Yes Ede Russell NP  
vit C/E/zinc/lutein/zeaxanthin ([address] PO) Take 1 Tab by mouth daily. Yes Provider, Historical  
pravastatin (PRAVACHOL) 10 mg tablet Take  by mouth nightly. Yes Provider, Historical  
lidocaine-prilocaine (EMLA) topical cream Apply  to affected area as needed for Pain. 7/18/19   Rosie Montoya NP  
ondansetron hcl (ZOFRAN) 8 mg tablet Take 1 Tab by mouth every eight (8) hours as needed for Nausea. 4/30/19   Bertrand Worrell NP  
LORazepam (ATIVAN) 1 mg tablet Take  by mouth nightly.     Provider, Historical  
acetaminophen 500 mg tab 500 mg, diphenhydrAMINE 25 mg cap 25 mg Take  by mouth nightly. Provider, Historical  
 
 
No Known Allergies Review of Systems:  A comprehensive review of systems was negative except for that written in the History of Present Illness. Objective:  
 
Visit Vitals /86 (BP 1 Location: Left arm, BP Patient Position: Sitting) Pulse 87 Temp 98.2 °F (36.8 °C) Resp 18 Ht 5' 6\" (1.676 m) Wt 87 kg (191 lb 12.8 oz) SpO2 94% BMI 30.96 kg/m² Temp (24hrs), Av °F (37.2 °C), Min:98.1 °F (36.7 °C), Max:99.8 °F (37.7 °C) Lines:  Left chest port:    
 
Physical Exam:   
General:  Alert, cooperative, well noursished, well developed, appears stated age Eyes:  Sclera anicteric. Pupils equally round and reactive to light. Mouth/Throat: Mucous membranes normal, oral pharynx clear Neck: Supple Lungs:   Clear to auscultation bilaterally, good effort CV:  Regular rate and rhythm,no murmur, click, rub or gallop Abdomen:   Soft, non-tender. bowel sounds normal. non-distended Extremities: No cyanosis or edema Skin: Skin color, texture, turgor normal. diffuse rash Uper and lower extremities, face; back not characteristic of drug rash Lymph nodes: Cervical and supraclavicular normal  
Musculoskeletal: No swelling or deformity Lines/Devices:  Intact, no erythema, drainage or tenderness Psych: Alert and oriented, normal mood affect given the setting Data Review: CBC: 
Recent Labs  
  19 WBC 0.0* 0.0* 0.0* GRANS 100*  --   -- MONOS 0*  --   --   
EOS 0*  --   --   
ANEU 0.0*  --   --   
ABL 0.0*  --   --   
HGB 6.9* 6.5* 7.3* HCT 20.7* 19.2* 21.6*  
PLT 34* 6* 12* BMP: 
Recent Labs  
  19 CREA 0.51* 0.49* 0.57* BUN 14 12 10  144 145  
K 3.8 3.6 3.7 * 111* 110* CO2 29 28 26 AGAP 5* 5* 9 GLU 88 96 100 LFTS: 
Recent Labs  
  19 
0435 19 
0455 19 
0257 TBILI 0.4 0.4 0.4 ALT 27 24 32 SGOT 16 13* 12* AP 66 69 72  
TP 5.4* 5.2* 5.8* ALB 1.8* 1.6* 1.7* Microbiology:  
 
All Micro Results Procedure Component Value Units Date/Time CULTURE, BLOOD [[id number]] Collected:  11/21/19 0106 Order Status:  Completed Specimen:  Blood Updated:  11/25/19 8713 Special Requests: --     
  RIGHT 
HAND Culture result: NO GROWTH 4 DAYS     
 CULTURE, BLOOD [[id number]] Collected:  11/20/19 2028 Order Status:  Completed Specimen:  Blood Updated:  11/25/19 6770 Special Requests: PORT Culture result: NO GROWTH 5 DAYS     
 CULTURE, STOOL [[id number]] Collected:  11/22/19 9640 Order Status:  Completed Specimen:  Stool Updated:  11/24/19 5685 Special Requests: NO SPECIAL REQUESTS Culture result:    
  No Salmonella, Shigella, or Ecoli 0157 isolated. NO GROWTH OF GRAM NEGATIVE FECAL REGGIE,  
     
 CULTURE, BLOOD [[id number]] Collected:  11/18/19 2032 Order Status:  Completed Specimen:  Blood Updated:  11/23/19 4374 Special Requests: --     
  RIGHT 
HAND Culture result: NO GROWTH 5 DAYS     
 OVA & PARASITES, STOOL [[id number]] Collected:  11/22/19 1815 Order Status:  Completed Specimen:  Stool Updated:  11/22/19 2554 CULTURE, BLOOD [[id number]]  (Abnormal)  (Susceptibility) Collected:  11/18/19 1955 Order Status:  Completed Specimen:  Blood Updated:  11/22/19 0730 Special Requests: LATERAL PORT     
  GRAM STAIN GRAM POS COCCI IN CHAINS AEROBIC AND ANAEROBIC BOTTLES RESULTS VERIFIED, PHONED TO AND READ BACK BY EJ CORREA RN @ 5077 ON 11/19/2019 BY [facility] Culture result:    
  ENTEROCOCCUS FAECALIS GROUP D ALPHA STREPTOCOCCUS, NOT S. PNEUMONIAE THIS ORGANISM MAY BE INDICATIVE OF CULTURE CONTAMINATION, HOWEVER, CLINICAL CORRELATION NEEDS TO BE EVALUATED, AS EACH CASE IS UNIQUE. REFER TO Christopher Muller Dr PANEL [id number] CULTURE, URINE [[id number]] Collected:  11/19/19 0102 Order Status:  Completed Specimen:  Urine from Clean catch Updated:  11/21/19 0710 Special Requests: NO SPECIAL REQUESTS Culture result: NO GROWTH 2 DAYS     
 BLOOD CULTURE ID PANEL [[id number]]  (Abnormal) Collected:  11/18/19 1955 Order Status:  Completed Specimen:  Blood Updated:  11/19/19 1420 Acc. no. from [facility] [id number] Enterococcus DETECTED Streptococcus DETECTED Comment: RESULTS VERIFIED, PHONED TO AND READ BACK BY 
EJ CORREA RN @ 3630 ON 11/19/2019 BY WILLOW Norris Mercury A/B (Vancomycin Resistance Gene) NOT DETECTED Clinical Consideration Multiple organisms detected. Results do not replace susceptibility testing. Clarence Ulloa [[id number]] Collected:  11/19/19 0415 Order Status:  Canceled Specimen:  Stool Imaging:  
 
 
Signed By: Felisa Jaramillo NP November 25, 2019

## 2019-11-25 NOTE — PROGRESS NOTES
END OF SHIFT NOTE: 
 
Intake/Output 11/24 1901 - 11/25 0700 In: 7713 [I.V.:1018] Out: 800 [Urine:800] Voiding: YES Catheter: NO 
Drain:   
 
 
 
 
 
Stool:  0 occurrences. Stool Assessment Stool Color: Thurston Hams (11/24/19 0518) Stool Appearance: Soft;Loose (11/24/19 0518) Stool Amount: Small (11/24/19 0518) Stool Source/Status: Rectum (11/24/19 0518) Emesis:  0 occurrences. VITAL SIGNS Patient Vitals for the past 12 hrs: 
 Temp Pulse Resp BP SpO2  
11/25/19 0030 99.5 °F (37.5 °C) 82 18 138/61 95 % 11/24/19 2148 99.2 °F (37.3 °C) 81 18 147/69 96 % 11/24/19 2059 99.2 °F (37.3 °C) 86 18 151/67 95 % 11/24/19 1959 99.5 °F (37.5 °C) 91 18 148/61 92 % 11/24/19 1930 99 °F (37.2 °C) 93 18 146/52 91 % Pain Assessment Pain 1 Pain Scale 1: Numeric (0 - 10) (11/25/19 0255) Pain Intensity 1: 0 (11/25/19 0255) Patient Stated Pain Goal: 0 (11/25/19 0255) Pain Reassessment 1: Yes (11/24/19 1025) Pain Onset 1: suddenly (11/24/19 1000) Pain Location 1: Abdomen (11/24/19 1000) Pain Orientation 1: Left;Right; Anterior (11/24/19 1000) Pain Description 1: Cramping (11/24/19 1000) Pain Intervention(s) 1: Medication (see MAR) (11/24/19 1000) Ambulating Yes Additional Information: VSS. Afebrile. Pt will need one unit of blood today. No needs voiced at this time Shift report given to oncoming nurse at the bedside.  
 
Tang Zamora RN

## 2019-11-25 NOTE — PROGRESS NOTES
SW reviewed patient's chart on this date. Patient is currently in count recovery, and is not yet ready for discharge. DC plan remains for patient to return to home whenever medically ready. Patient is ambulating per PT and RN progress notes; PT service has signed off as of today. SW will continue to monitor this case during current admission for potential DC needs.

## 2019-11-25 NOTE — PROGRESS NOTES
Problem: Falls - Risk of 
Goal: *Absence of Falls Description Document Aleksandar Nevarez Fall Risk and appropriate interventions in the flowsheet. Outcome: Progressing Towards Goal 
Note: Fall Risk Interventions: 
Mobility Interventions: Communicate number of staff needed for ambulation/transfer, Patient to call before getting OOB Medication Interventions: Assess postural VS orthostatic hypotension, Patient to call before getting OOB Elimination Interventions: Call light in reach, Patient to call for help with toileting needs History of Falls Interventions: Room close to nurse's station Problem: Patient Education: Go to Patient Education Activity Goal: Patient/Family Education Outcome: Progressing Towards Goal 
  
Problem: Anemia Care Plan (Adult and Pediatric) Goal: *Labs within defined limits Outcome: Progressing Towards Goal 
Goal: *Tolerates increased activity Outcome: Progressing Towards Goal 
  
Problem: Patient Education: Go to Patient Education Activity Goal: Patient/Family Education Outcome: Progressing Towards Goal 
  
Problem: Diarrhea (Adult and Pediatrics) Goal: *Absence of diarrhea Outcome: Progressing Towards Goal 
  
Problem: Nausea/Vomiting (Adult) Goal: *Absence of nausea/vomiting Outcome: Progressing Towards Goal 
  
Problem: Patient Education: Go to Patient Education Activity Goal: Patient/Family Education Outcome: Progressing Towards Goal 
  
Problem: Nutrition Deficit Goal: *Optimize nutritional status Outcome: Progressing Towards Goal 
  
Problem: Patient Education: Go to Patient Education Activity Goal: Patient/Family Education Outcome: Progressing Towards Goal

## 2019-11-26 LAB
ABO + RH BLD: NORMAL
ALBUMIN SERPL-MCNC: 1.8 G/DL (ref 3.2–4.6)
ALBUMIN/GLOB SERPL: 0.5 {RATIO} (ref 1.2–3.5)
ALP SERPL-CCNC: 72 U/L (ref 50–136)
ALT SERPL-CCNC: 27 U/L (ref 12–65)
ANION GAP SERPL CALC-SCNC: 4 MMOL/L (ref 7–16)
AST SERPL-CCNC: 13 U/L (ref 15–37)
BACTERIA SPEC CULT: NORMAL
BILIRUB SERPL-MCNC: 0.4 MG/DL (ref 0.2–1.1)
BLD PROD TYP BPU: NORMAL
BLD PROD TYP BPU: NORMAL
BLOOD GROUP ANTIBODIES SERPL: NORMAL
BPU ID: NORMAL
BPU ID: NORMAL
BUN SERPL-MCNC: 12 MG/DL (ref 8–23)
CALCIUM SERPL-MCNC: 8.3 MG/DL (ref 8.3–10.4)
CHLORIDE SERPL-SCNC: 107 MMOL/L (ref 98–107)
CO2 SERPL-SCNC: 30 MMOL/L (ref 21–32)
CREAT SERPL-MCNC: 0.63 MG/DL (ref 0.6–1)
CROSSMATCH RESULT,%XM: NORMAL
CROSSMATCH RESULT,%XM: NORMAL
DIFFERENTIAL METHOD BLD: ABNORMAL
ERYTHROCYTE [DISTWIDTH] IN BLOOD BY AUTOMATED COUNT: 13.8 % (ref 11.9–14.6)
GLOBULIN SER CALC-MCNC: 3.9 G/DL (ref 2.3–3.5)
GLUCOSE SERPL-MCNC: 86 MG/DL (ref 65–100)
HCT VFR BLD AUTO: 23.4 % (ref 35.8–46.3)
HGB BLD-MCNC: 8 G/DL (ref 11.7–15.4)
LDH SERPL L TO P-CCNC: 245 U/L (ref 110–210)
MAGNESIUM SERPL-MCNC: 1.7 MG/DL (ref 1.8–2.4)
MCH RBC QN AUTO: 30.3 PG (ref 26.1–32.9)
MCHC RBC AUTO-ENTMCNC: 34.2 G/DL (ref 31.4–35)
MCV RBC AUTO: 88.6 FL (ref 79.6–97.8)
NRBC # BLD: 0 K/UL (ref 0–0.2)
PHOSPHATE SERPL-MCNC: 3.5 MG/DL (ref 2.3–3.7)
PLATELET # BLD AUTO: 21 K/UL (ref 150–450)
PLATELET COMMENTS,PCOM: ABNORMAL
PMV BLD AUTO: 9.7 FL (ref 9.4–12.3)
POTASSIUM SERPL-SCNC: 3.6 MMOL/L (ref 3.5–5.1)
PROT SERPL-MCNC: 5.7 G/DL (ref 6.3–8.2)
RBC # BLD AUTO: 2.64 M/UL (ref 4.05–5.2)
RBC MORPH BLD: ABNORMAL
SERVICE CMNT-IMP: NORMAL
SODIUM SERPL-SCNC: 141 MMOL/L (ref 136–145)
SPECIMEN EXP DATE BLD: NORMAL
STATUS OF UNIT,%ST: NORMAL
STATUS OF UNIT,%ST: NORMAL
UNIT DIVISION, %UDIV: 0
UNIT DIVISION, %UDIV: 0
URATE SERPL-MCNC: 1.2 MG/DL (ref 2.6–6)
WBC # BLD AUTO: 0 K/UL (ref 4.3–11.1)
WBC MORPH BLD: ABNORMAL

## 2019-11-26 PROCEDURE — 83615 LACTATE (LD) (LDH) ENZYME: CPT

## 2019-11-26 PROCEDURE — 74011250637 HC RX REV CODE- 250/637: Performed by: INTERNAL MEDICINE

## 2019-11-26 PROCEDURE — 99233 SBSQ HOSP IP/OBS HIGH 50: CPT | Performed by: INTERNAL MEDICINE

## 2019-11-26 PROCEDURE — 74011000258 HC RX REV CODE- 258: Performed by: INTERNAL MEDICINE

## 2019-11-26 PROCEDURE — 84100 ASSAY OF PHOSPHORUS: CPT

## 2019-11-26 PROCEDURE — 74011250637 HC RX REV CODE- 250/637: Performed by: NURSE PRACTITIONER

## 2019-11-26 PROCEDURE — 80053 COMPREHEN METABOLIC PANEL: CPT

## 2019-11-26 PROCEDURE — 93308 TTE F-UP OR LMTD: CPT

## 2019-11-26 PROCEDURE — 83735 ASSAY OF MAGNESIUM: CPT

## 2019-11-26 PROCEDURE — 84550 ASSAY OF BLOOD/URIC ACID: CPT

## 2019-11-26 PROCEDURE — 74011250636 HC RX REV CODE- 250/636: Performed by: INTERNAL MEDICINE

## 2019-11-26 PROCEDURE — 85025 COMPLETE CBC W/AUTO DIFF WBC: CPT

## 2019-11-26 PROCEDURE — 65270000029 HC RM PRIVATE

## 2019-11-26 PROCEDURE — 65270000015 HC RM PRIVATE ONCOLOGY

## 2019-11-26 RX ORDER — TEMAZEPAM 15 MG/1
15 CAPSULE ORAL
Status: DISCONTINUED | OUTPATIENT
Start: 2019-11-26 | End: 2019-12-12 | Stop reason: HOSPADM

## 2019-11-26 RX ADMIN — ACYCLOVIR 400 MG: 800 TABLET ORAL at 17:32

## 2019-11-26 RX ADMIN — ACYCLOVIR 400 MG: 800 TABLET ORAL at 09:01

## 2019-11-26 RX ADMIN — VORICONAZOLE 200 MG: 200 TABLET, FILM COATED ORAL at 09:01

## 2019-11-26 RX ADMIN — VORICONAZOLE 200 MG: 200 TABLET, FILM COATED ORAL at 21:50

## 2019-11-26 RX ADMIN — LORATADINE 10 MG: 10 TABLET ORAL at 09:01

## 2019-11-26 RX ADMIN — CAMPHOR AND MENTHOL: 5; 5 LOTION TOPICAL at 21:53

## 2019-11-26 RX ADMIN — ALLOPURINOL 300 MG: 300 TABLET ORAL at 09:01

## 2019-11-26 RX ADMIN — PRAVASTATIN SODIUM 10 MG: 20 TABLET ORAL at 21:50

## 2019-11-26 RX ADMIN — TEMAZEPAM 15 MG: 15 CAPSULE ORAL at 21:50

## 2019-11-26 RX ADMIN — CAMPHOR AND MENTHOL: 5; 5 LOTION TOPICAL at 09:02

## 2019-11-26 RX ADMIN — Medication 2 TABLET: at 09:11

## 2019-11-26 RX ADMIN — LORAZEPAM 1 MG: 1 TABLET ORAL at 21:50

## 2019-11-26 RX ADMIN — CAMPHOR AND MENTHOL: 5; 5 LOTION TOPICAL at 17:32

## 2019-11-26 RX ADMIN — DULOXETINE 30 MG: 30 CAPSULE, DELAYED RELEASE ORAL at 09:01

## 2019-11-26 RX ADMIN — PIPERACILLIN AND TAZOBACTAM 3.38 G: 3; .375 INJECTION, POWDER, FOR SOLUTION INTRAVENOUS at 01:13

## 2019-11-26 RX ADMIN — PIPERACILLIN AND TAZOBACTAM 3.38 G: 3; .375 INJECTION, POWDER, FOR SOLUTION INTRAVENOUS at 09:03

## 2019-11-26 RX ADMIN — LORAZEPAM 0.5 MG: 2 INJECTION INTRAMUSCULAR; INTRAVENOUS at 14:47

## 2019-11-26 RX ADMIN — TBO-FILGRASTIM 480 MCG: 480 INJECTION, SOLUTION SUBCUTANEOUS at 21:50

## 2019-11-26 RX ADMIN — POTASSIUM CHLORIDE 20 MEQ: 20 TABLET, EXTENDED RELEASE ORAL at 09:01

## 2019-11-26 RX ADMIN — POTASSIUM CHLORIDE 20 MEQ: 20 TABLET, EXTENDED RELEASE ORAL at 17:32

## 2019-11-26 RX ADMIN — LOPERAMIDE HYDROCHLORIDE 2 MG: 2 CAPSULE ORAL at 09:11

## 2019-11-26 RX ADMIN — PIPERACILLIN AND TAZOBACTAM 3.38 G: 3; .375 INJECTION, POWDER, FOR SOLUTION INTRAVENOUS at 17:32

## 2019-11-26 NOTE — PROGRESS NOTES
Nutrition follow-up Reason for initial assessment: Length of Stay  
  
Assessment:  
Food/Nutrition Patient History:  Patient with PMH of AML and hypercholesterolemia admitted for chemo. She is day19 FLAG-EVNITA. On Granix, awaiting count recovery. She was seen today with spouse at bedside. She states that her appetite remains poor. She states that she has continued to eat but not as well as she was before. She has been able to drink all her Ensure Enlive. She states that she has only been able to tolerate yogurt today. She states that she ate 50% of noon meal yesterday. Other foods she has been able to tolerate are toast and jelly, and grits. She states that her  brought her a milkshake from [employer] Sunday and she crank it all. DIET REGULAR   
DIET Nutrition Supplements Ensure Enlive with am and evening meals 
  
Anthropometrics:Height: 5' 6\" (167.6 cm),  Weight: 87.1 kg (192 lb), Weight Source: Standing scale (comment), Body mass index is 30.99 kg/m². BMI class of obese Macronutrient needs: 87.1 kg Listed body weight LWS:  0577-2737 CZAE /day (18-20 kcal/kg) BKJ:  16-12 CYNYO protein/day (20% kcal) Intake/Comparative Standards: Recorded meal(s): 63% of 7 recorded meals since last RD assessment. This potentially meets ~85% of kcal and ~70% of protein needs. Noted decline since last assessment and previous admissions. Only tolerating yogurt and Ensure Enlive today. Ensure Enlive BID providing 700 kcal and 40 g pro (~45% EEN and ~50% EPN). Nutrition Diagnosis:  
Inadequate oral intake related to poor appetite as evidenced by patient reported barrier to PO intake, decline in intake meeting ~85% estimated energy needs and ~70% of estimated protein needs. 
  
Intervention: 
Meals and snacks: Continue current diet.  
Encouraged to order from [facility] for preferences. Okay and encouraged to continue outside foods as available.  
Nutrition Supplement Therapy: Continue Ensure Enlive BID 
 Coordination of Nutrition Care: KAM Bledsoe Discharge Nutrition Plan: Too soon to determine. [address] [facility] [address] [place] [address], [address], [address] [facility]

## 2019-11-26 NOTE — PROGRESS NOTES
Infectious Disease Progress Note Today's Date: 2019 Admit Date: 2019 Impression: · E faecalis bacteremia/Alpha strep (); repeat blood cx () NG ; TTE (-); source Port vs GI 
· AML; admitted for salvage FLAG-VENITA · Febrile neutropenia · Pancytopenia · R elbow pain resolved · S/P fall ; CT head (-) · Double vision; Neuro has seen; ? 6th nerve palsy; symptoms improved nowt · Rash; I don't think this is a drug rash · Diarrhea; chronic Plan:  
· Continue Zosyn to treat Enterococcus /strep bacteremia · Consider port removal if blood remain positive · ID will continue to follow Anti-infectives: · Rogers Ply · ACV 
· IV Vancomycin (- 
· IV Cefepime (- 
· PO LVQ (-) Subjective: No fevers. Not able to eat much. After eating reports low volume loose stools. No Known Allergies Review of Systems:  A comprehensive review of systems was negative except for that written in the History of Present Illness. Objective:  
 
Visit Vitals /80 (BP 1 Location: Left arm, BP Patient Position: Sitting) Pulse 89 Temp 99 °F (37.2 °C) Resp 18 Ht 5' 6\" (1.676 m) Wt 88.3 kg (194 lb 11.2 oz) SpO2 91% BMI 31.43 kg/m² Temp (24hrs), Av.7 °F (37.1 °C), Min:97.9 °F (36.6 °C), Max:99.4 °F (37.4 °C) Lines:  Left chest port:    
 
Physical Exam:   
General:  Alert, cooperative, well noursished, well developed, appears stated age Eyes:  Sclera anicteric. Pupils equally round and reactive to light. Mouth/Throat: Mucous membranes normal, oral pharynx clear Neck: Supple Lungs:   Clear to auscultation bilaterally, good effort CV:  Regular rate and rhythm,no murmur, click, rub or gallop Abdomen:   Soft, non-tender. bowel sounds hyperactive. mildly-distended Extremities: No cyanosis or edema Skin: Skin color, texture, turgor normal. diffuse rash Uper and lower extremities, face; back not characteristic of drug rash Lymph nodes: Cervical and supraclavicular normal  
Musculoskeletal: No swelling or deformity Lines/Devices:  Intact, no erythema, drainage or tenderness Psych: Alert and oriented, normal mood affect given the setting Data Review: CBC: 
Recent Labs  
  11/26/19 0516 11/25/19 0435 11/24/19 0455 WBC 0.0* 0.0* 0.0* GRANS  --  100*  --   
MONOS  --  0*  --   
EOS  --  0*  --   
ANEU  --  0.0*  -- ABL  --  0.0*  --   
HGB 8.0* 6.9* 6.5* HCT 23.4* 20.7* 19.2*  
PLT 21* 34* 6* BMP: 
Recent Labs  
  11/26/19 0516 11/25/19 0435 11/24/19 0455 CREA 0.63 0.51* 0.49* BUN 12 14 12  145 144  
K 3.6 3.8 3.6  111* 111* CO2 30 29 28 AGAP 4* 5* 5*  
GLU 86 88 96 LFTS: 
Recent Labs  
  11/26/19 0516 11/25/19 0435 11/24/19 0455 TBILI 0.4 0.4 0.4 ALT 27 27 24 SGOT 13* 16 13* AP 72 66 69  
TP 5.7* 5.4* 5.2* ALB 1.8* 1.8* 1.6* Microbiology:  
 
All Micro Results Procedure Component Value Units Date/Time C. DIFFICILE AG & TOXIN A/B [[id number]] Order Status:  Sent Specimen:  Stool CULTURE, BLOOD [[id number]] Collected:  11/21/19 0106 Order Status:  Completed Specimen:  Blood Updated:  11/26/19 5232 Special Requests: --     
  RIGHT 
HAND Culture result: NO GROWTH 5 DAYS     
 CULTURE, BLOOD [[id number]] Collected:  11/20/19 2028 Order Status:  Completed Specimen:  Blood Updated:  11/25/19 0633 Special Requests: PORT Culture result: NO GROWTH 5 DAYS     
 CULTURE, STOOL [[id number]] Collected:  11/22/19 0411 Order Status:  Completed Specimen:  Stool Updated:  11/24/19 0462 Special Requests: NO SPECIAL REQUESTS Culture result:    
  No Salmonella, Shigella, or Ecoli 0157 isolated. NO GROWTH OF GRAM NEGATIVE FECAL REGGIE,  
     
 CULTURE, BLOOD [[id number]] Collected:  11/18/19 2032 Order Status:  Completed Specimen:  Blood Updated:  11/23/19 2088 Special Requests: --     
  RIGHT 
HAND Culture result: NO GROWTH 5 DAYS     
 OVA & PARASITES, STOOL [[id number]] Collected:  11/22/19 6596 Order Status:  Completed Specimen:  Stool Updated:  11/22/19 4363 CULTURE, BLOOD [[id number]]  (Abnormal)  (Susceptibility) Collected:  11/18/19 1955 Order Status:  Completed Specimen:  Blood Updated:  11/22/19 0730 Special Requests: LATERAL PORT     
  GRAM STAIN GRAM POS COCCI IN CHAINS AEROBIC AND ANAEROBIC BOTTLES RESULTS VERIFIED, PHONED TO AND READ BACK BY EJ CORREA RN @ 2350 ON 11/19/2019 BY [facility] Culture result:    
  ENTEROCOCCUS FAECALIS GROUP D ALPHA STREPTOCOCCUS, NOT S. PNEUMONIAE THIS ORGANISM MAY BE INDICATIVE OF CULTURE CONTAMINATION, HOWEVER, CLINICAL CORRELATION NEEDS TO BE EVALUATED, AS EACH CASE IS UNIQUE. REFER TO Christopher Muller Dr PANEL [id number] CULTURE, URINE [[id number]] Collected:  11/19/19 0102 Order Status:  Completed Specimen:  Urine from Clean catch Updated:  11/21/19 0710 Special Requests: NO SPECIAL REQUESTS Culture result: NO GROWTH 2 DAYS     
 BLOOD CULTURE ID PANEL [[id number]]  (Abnormal) Collected:  11/18/19 1955 Order Status:  Completed Specimen:  Blood Updated:  11/19/19 1420 Acc. no. from [facility] [id number] Enterococcus DETECTED Streptococcus DETECTED Comment: RESULTS VERIFIED, PHONED TO AND READ BACK BY 
EJ CORREA RN @ 8983 ON 11/19/2019 BY AMDOMINIC ALMODOVAR/ZARIA (Vancomycin Resistance Gene) NOT DETECTED Clinical Consideration Multiple organisms detected. Results do not replace susceptibility testing. Monico Sosa [[id number]] Collected:  11/19/19 0415 Order Status:  Canceled Specimen:  Stool Imaging:  
 
 
Signed By: Emigdio Delgado NP November 26, 2019

## 2019-11-26 NOTE — PROGRESS NOTES
[facility] Hematology & Oncology Inpatient Hematology / Oncology Daily Progress Note Reason for Admission:  Admission for antineoplastic chemotherapy [Z51.11] 24 Hour Events: 
BCx- + enterococcus/streptococcus Repeat BC NTD Day 19 FLAG-VENITA Awaiting count recovery, on Granix ROS: 
Constitutional: +fatigue -fever CV: Negative for chest pain, palpitations, edema. Respiratory: Negative for dyspnea, cough, wheezing. GI: Negative for nausea, abdominal pain. Diarrhea. 10 point review of systems is otherwise negative with the exception of the elements mentioned above in the HPI. No Known Allergies Past Medical History:  
Diagnosis Date  AML (acute myeloid leukemia) ([facility] [address] dx- 4/2019  
 followed by dr Winn Beverage  Depression  Hypercholesterolemia  Infection   
 of port -- was placed 5/2019-- removed 6/2019---right chest  
 Psychiatric disorder   
 aniexty  Sleep apnea Past Surgical History:  
Procedure Laterality Date  HX OTHER SURGICAL    
 colonoscopy  HX VASCULAR ACCESS    
 IR INSERT TUNL CVC W PORT OVER 5 YEARS  4/30/2019  IR INSERT TUNL CVC W PORT OVER 5 YEARS  7/15/2019  IR REMOVE TUNL CVAD W PORT/PUMP  6/13/2019 Family History Problem Relation Age of Onset  Cancer Father Social History Socioeconomic History  Marital status:  Spouse name: Not on file  Number of children: Not on file  Years of education: Not on file  Highest education level: Not on file Occupational History  Not on file Social Needs  Financial resource strain: Not on file  Food insecurity:  
  Worry: Not on file Inability: Not on file  Transportation needs:  
  Medical: Not on file Non-medical: Not on file Tobacco Use  Smoking status: Never Smoker  Smokeless tobacco: Never Used Substance and Sexual Activity  Alcohol use: Never Frequency: Never  Drug use: Never  Sexual activity: Not on file Lifestyle  Physical activity:  
  Days per week: Not on file Minutes per session: Not on file  Stress: Not on file Relationships  Social connections:  
  Talks on phone: Not on file Gets together: Not on file Attends Taoism service: Not on file Active member of club or organization: Not on file Attends meetings of clubs or organizations: Not on file Relationship status: Not on file  Intimate partner violence:  
  Fear of current or ex partner: Not on file Emotionally abused: Not on file Physically abused: Not on file Forced sexual activity: Not on file Other Topics Concern [address] Service Not Asked  Blood Transfusions Not Asked  Caffeine Concern Not Asked  Occupational Exposure Not Asked Marcial Rivera Hazards Not Asked  Sleep Concern Not Asked  Stress Concern Not Asked  Weight Concern Not Asked  Special Diet Not Asked  Back Care Not Asked  Exercise Not Asked  Bike Helmet Not Asked  Seat Belt Not Asked  Self-Exams Not Asked Social History Narrative  Not on file Current Facility-Administered Medications Medication Dose Route Frequency Provider Last Rate Last Dose  
 0.9% sodium chloride infusion 250 mL  250 mL IntraVENous PRN Meggan Whitlock MD      
 camph[facility]hol [facility]) 0.5-0.5 % lotion   Topical TID Kasey Arreola NP      
 piperacillin-tazobactam (ZOSYN) 3.375 g in 0.9% sodium chloride (MBP/ADV) 100 mL  3.375 g IntraVENous Q8H Kari Coon MD 25 mL/hr at 11/26/19 0903 3.375 g at 11/26/19 0903  
 0.9% sodium chloride infusion 250 mL  250 mL IntraVENous PRN Meggan Whitlock MD      
 sodium chloride 0.9 % bolus infusion 500 mL  500 mL IntraVENous QHS Kasey Arreola NP   500 mL at 11/21/19 2315  baclofen (LIORESAL) tablet 5 mg  5 mg Oral Q8H PRN Meggan Whitlock MD   5 mg at 11/21/19 1335  potassium chloride (K-DUR, KLOR-CON) SR tablet 20 mEq  20 mEq Oral BID Meggan Whitlock MD   20 mEq at 11/26/19 0901  albuterol (PROVENTIL VENTOLIN) nebulizer solution 2.5 mg  2.5 mg Nebulization Q6H PRN PierreMynorah NP   2.5 mg at 11/21/19 1600  
 alum-mag hydroxide-simeth (MYLANTA) oral suspension 30 mL  30 mL Oral Q4H PRN Ayad Brooks MD   30 mL at 11/21/19 3982  loperamide (IMODIUM) capsule 2 mg  2 mg Oral Q4H PRN Ayad Brooks MD   2 mg at 11/26/19 1433  loratadine (CLARITIN) tablet 10 mg  10 mg Oral DAILY Evaline Jungling, NP   10 mg at 11/26/19 0901  central line flush (saline) syringe 10 mL  10 mL InterCATHeter PRN Ayad Brooks MD   10 mL at 11/18/19 2110  
 docusate sodium (COLACE) capsule 100 mg  100 mg Oral BID PRN [facility]   100 mg at 11/11/19 1308  LORazepam (ATIVAN) injection 0.5 mg  0.5 mg IntraVENous Q6H PRN Ayad Brooks MD   0.5 mg at 11/17/19 0546  
 saline peripheral flush soln 10 mL  10 mL InterCATHeter PRN Ayad Brooks MD   10 mL at 11/23/19 2229  
 heparin (porcine) pf 300-500 Units  300-500 Units InterCATHeter PRN Chip MD Todd      
 tbo-filgrastim [facility]) injection 480 mcg  480 mcg SubCUTAneous QHS Ayad Brooks MD   480 mcg at 11/25/19 2140  
 acyclovir (ZOVIRAX) tablet 400 mg  400 mg Oral BID [facility] NP   400 mg at 11/26/19 0901  allopurinol (ZYLOPRIM) tablet 300 mg  300 mg Oral DAILY [facility], NP   300 mg at 11/26/19 0901  cholecalciferol (VITAMIN D3) (400 Units /10 mcg) tablet 2 Tab  800 Units Oral DAILY [facility], NP   2 Tab at 11/26/19 0768  DULoxetine (CYMBALTA) capsule 30 mg  30 mg Oral DAILY [facility], NP   30 mg at 11/26/19 0901  lidocaine-prilocaine (EMLA) 2.5-2.5 % cream   Topical PRN Winton Paragon, NP      
 LORazepam (ATIVAN) tablet 1 mg  1 mg Oral QHS [facility], NP   1 mg at 11/25/19 2141  pravastatin (PRAVACHOL) tablet 10 mg  10 mg Oral QHS [facility], NP   10 mg at 11/25/19 2141  voriconazole (VFEND) tablet 200 mg  200 mg Oral Q12H [facility], NP   200 mg at 11/26/19 0901  ondansetron (ZOFRAN) injection 4 mg  4 mg IntraVENous Q4H PRN Colon Grapes, NP   4 mg at 19 1153  prochlorperazine (COMPAZINE) with saline injection 5 mg  5 mg IntraVENous Q6H PRN Colon Grapes, NP   5 mg at 19 1632  
 HYDROcodone-acetaminophen (NORCO) 5-325 mg per tablet 1 Tab  1 Tab Oral Q6H PRN Colon Grapes, NP   1 Tab at 19  morphine injection 2 mg  2 mg IntraVENous Q4H PRN Colon Grapes, NP      
 acetaminophen (TYLENOL) tablet 650 mg  650 mg Oral Q6H PRN Riki Allen MD   650 mg at 19 1144  diphenhydrAMINE (BENADRYL) capsule 25 mg  25 mg Oral Q6H PRN Riki Allen MD   25 mg at 19 2140  acetaminophen/diphenhydrAMINE (TYLENOL PM EXT STR) 500/25 mg (Patient Supplied)   Oral QHS Riki Allen MD      
 vit C,J-Ah-ddufy-lutein-zeaxan (PRESERVISION AREDS-2) capsule 1 Cap (Patient Supplied)  [address] Riki Allen MD   1 Cap at 19 2964 OBJECTIVE: 
Patient Vitals for the past 8 hrs: 
 BP Temp Pulse Resp SpO2  
19 0753 130/75 97.9 °F (36.6 °C) 91 18 92 % 19 0411 143/65 98.8 °F (37.1 °C) 84 18 92 % Temp (24hrs), Av.7 °F (37.1 °C), Min:97.9 °F (36.6 °C), Max:99.4 °F (37.4 °C) 
 
 0701 -  1900 In: 360 [P.O.:360] Out: - Physical Exam: 
Constitutional: Well developed, frail appearing female in no acute distress, sitting in bed HEENT: Normocephalic and atraumatic. Oropharynx is clear, mucous membranes are moist. Extraocular muscles are intact. Sclerae anicteric. Skin Warm and dry. Scattered rash. No erythema. No pallor. Bruising noted on BUE/R elbow and abdomen. Respiratory Crackles at bases,  normal air exchange without accessory muscle use. CVS Normal rate, regular rhythm and normal S1 and S2. No murmurs, gallops, or rubs. Abdomen Soft, mildly tender across lower abd-improving, nondistended, normoactive bowel sounds. Neuro Grossly nonfocal with no obvious sensory or motor deficits. MSK Normal range of motion in general.  No edema and no tenderness. Psych Appropriate mood and affect. Labs:   
Recent Results (from the past 24 hour(s)) METABOLIC PANEL, COMPREHENSIVE Collection Time: 11/26/19  5:16 AM  
Result Value Ref Range Sodium 141 136 - 145 mmol/L Potassium 3.6 3.5 - 5.1 mmol/L Chloride 107 98 - 107 mmol/L  
 CO2 30 21 - 32 mmol/L Anion gap 4 (L) 7 - 16 mmol/L Glucose 86 65 - 100 mg/dL BUN 12 8 - 23 MG/DL Creatinine 0.63 0.6 - 1.0 MG/DL  
 GFR est AA >60 >60 ml/min/1.73m2 GFR est non-AA >60 >60 ml/min/1.73m2 Calcium 8.3 8.3 - 10.4 MG/DL Bilirubin, total 0.4 0.2 - 1.1 MG/DL  
 ALT (SGPT) 27 12 - 65 U/L  
 AST (SGOT) 13 (L) 15 - 37 U/L Alk. phosphatase 72 50 - 136 U/L Protein, total 5.7 (L) 6.3 - 8.2 g/dL Albumin 1.8 (L) 3.2 - 4.6 g/dL Globulin 3.9 (H) 2.3 - 3.5 g/dL A-G Ratio 0.5 (L) 1.2 - 3.5 MAGNESIUM Collection Time: 11/26/19  5:16 AM  
Result Value Ref Range Magnesium 1.7 (L) 1.8 - 2.4 mg/dL LD Collection Time: 11/26/19  5:16 AM  
Result Value Ref Range  (H) 110 - 210 U/L  
URIC ACID Collection Time: 11/26/19  5:16 AM  
Result Value Ref Range Uric acid 1.2 (L) 2.6 - 6.0 MG/DL  
PHOSPHORUS Collection Time: 11/26/19  5:16 AM  
Result Value Ref Range Phosphorus 3.5 2.3 - 3.7 MG/DL  
CBC WITH AUTOMATED DIFF Collection Time: 11/26/19  5:16 AM  
Result Value Ref Range WBC 0.0 (LL) 4.3 - 11.1 K/uL  
 RBC 2.64 (L) 4.05 - 5.2 M/uL HGB 8.0 (L) 11.7 - 15.4 g/dL HCT 23.4 (L) 35.8 - 46.3 % MCV 88.6 79.6 - 97.8 FL  
 MCH 30.3 26.1 - 32.9 PG  
 MCHC 34.2 31.4 - 35.0 g/dL  
 RDW 13.8 11.9 - 14.6 % PLATELET 21 (LL) 185 - 450 K/uL MPV 9.7 9.4 - 12.3 FL ABSOLUTE NRBC 0.00 0.0 - 0.2 K/uL  
 RBC COMMENTS NORMOCYTIC/NORMOCHROMIC    
 WBC COMMENTS Result Confirmed By Smear PLATELET COMMENTS MARKED    
 DF MANUAL Imaging: 
CT HEAD WO CONT [[id number]] Collected: 11/19/19 6460 Order Status: Completed Updated: 11/19/19 1054 Narrative:    
CT HEAD WITHOUT CONTRAST, 11/19/2019 History: Fall last night hitting left side of head Comparison: . Technique:   5 mm axial scans from the skull base to the vertex. All CT scans 
performed at this facility use one or all of the following: Automated exposure 
control, adjustment of the mA and/or kVp according to patient's size, iterative 
reconstruction. Findings:  No evidence of intracranial hemorrhage is seen. Faint bilateral basal 
ganglia calcifications are seen. No abnormal extra-axial fluid collections are 
seen.  Mild cortical involutional changes are seen which are not felt to be 
abnormal given the patient's age. Hodan Daly evidence for acute hydrocephalus is seen. No evidence of midline shift or herniation is seen.  No abnormal edema pattern 
is seen in a vascular distribution to suggest large artery infarction. Evaluation with bone windows shows no acute osseous changes.  The visualized 
sinuses, middle ears, and mastoid air cells are well aerated. Impression:    
IMPRESSION:   
1.  No acute intracranial process evident by noncontrast CT study of the head. XR CHEST SNGL V [[id number]] Collected: 11/18/19 2041 Order Status: Completed Updated: 11/18/19 2044 Narrative:    
EXAM: XR CHEST SNGL V 
 
INDICATION: neutropenic fever COMPARISON: 9/29/2019 FINDINGS: A portable AP radiograph of the chest was obtained at 1946 hours. The 
patient is on a cardiac monitor. The lungs are clear. The cardiac and 
mediastinal contours and pulmonary vascularity are normal.  The bones and soft 
tissues are grossly within normal limits. Impression:    
IMPRESSION: Normal chest.  
XR ELBOW RT MIN 3 V [[id number]] Collected: 11/10/19 1421 Order Status: Completed Updated: 11/10/19 1424 Narrative:    
Right elbow Clinical location: Elbow pain after feeling a pop, decreased range of motion FINDINGS: Four views of the right elbow show no fracture or dislocation. There 
is no joint effusion. The soft tissues are unremarkable. Impression:    
IMPRESSION: No acute osseous abnormality or joint derangement of the right 
elbow. ASSESSMENT: 
Problem List  Date Reviewed: 10/31/2019 Codes Class Noted Febrile neutropenia (HCC) ICD-10-CM: D70.9, R50.81 ICD-9-CM: 288.00, 780.61  9/21/2019 Pancytopenia due to antineoplastic chemotherapy [facility]) ICD-10-CM: D61.810, T45.1X5A 
ICD-9-CM: 284.11, E933.1  6/12/2019 Cellulitis of neck ICD-10-CM: P22.134 ICD-9-CM: 682.1  6/12/2019 Immunocompromised status associated with infection ICD-10-CM: B99.9 ICD-9-CM: 136.9  6/12/2019 Port or reservoir infection ICD-10-CM: F82.857R ICD-9-CM: 999.33  6/12/2019 Acute myeloid leukemia not having achieved remission ([facility] ICD-10-CM: C92.00 ICD-9-CM: 205.00  5/9/2019 Admission for antineoplastic chemotherapy ICD-10-CM: Z51.11 ICD-9-CM: V58.11  5/5/2019 AML [facility]acute myel[facility] ICD-10-CM: C92.00 ICD-9-CM: 205.00  4/28/2019 Weakness generalized ICD-10-CM: R53.1 ICD-9-CM: 780.79  4/28/2019 Pancytopenia ([facility] ICD-10-CM: V64.164 ICD-9-CM: 284.19  4/28/2019 Thrombocytopenia ([facility] ICD-10-CM: D69.6 ICD-9-CM: 287.5  4/27/2019 Ms. Abrahan Stapleton is a 68 y.o. female admitted on 11/7/2019. She is a known patient of Dr. 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 She failed induction with 7+3/Midostaurin. On Dacogen/Gilteritinib with recent BMbx with persistent residual disease. She is being admitted for FLAG-VENITA. She is feeling well and is ready to proceed with treatment. PLAN: 
AML 
- admit for salvage FLAG-VENITA 
- needs Echo prior - ordered 11/8 Day 1 FLAG-VENITA. Echo with EF 55-60%, proceed with tx. 
11/9 Day 2 FLAG-VENITA. Tolerating well, no issues. Uric acid 3.1 today. 11/10 Day 3 FLAG-VENITA. No issues with treatment. Uric acid 3.3 today. 11/12 Day 5 FLAG-VENITA. Tolerating treatment well. G-CSF to start tomorrow. 11/13 Day 6 FLAG-VENITA, doing well, Granix/claritin starts today 11/19 Day 12 FLAG-VENITA. Awaiting count recovery, con't Granix. 11/20 Day 13 FLAG-VENITA. WBC 0. Continue Granix. Pancytopenia secondary to disease - Transfuse prn per [facility] SOPs 
11/19 Transfuse PRBCs today R elbow pain 11/11 c/o R elbow pain with limited ROM yesterday. Xray neg. Pain improved today, noted bruising. Normal ROM on exam. 
 
Mild Abd discomfort/loose stools 11/13 discomfort is improved from yesterday, will monitor 11/14 loose stools, but not watery, can use Imodium prn 
11/15 Imodium working well  
11/16 controlled 11/26 Ova and parasite 11/22 pending. Check for C diff if stool appropriate. Continue anti diarrheal for now Neutropenic fever / UTI 
11/19 Tmax 102. 1.  UA +UTI. UCx pending. BCx-NGTD. CXR neg. On Cef/Vanc. DC prophylactic LVQ. 11/20 Tmax 102. BCx positive for streptococcus/enterococcus. Subculture in progress. On Cef/Vanc.   
11/22 repeat BC NTD. Alpha strep on vanc. 11/24 Repeat BC NTD. No fever. 11/25 new skin rash. ?RT to vanc. Consult ID for recommendations. 11/26 ID dc'd cefe and vanc. Started zosyn. TTE ordered. Sepsis not present on admission 11/21  BCx positive for streptococcus/enterococcus. Fall 
11/19 s/p last PM.  No LOC. Hit head. CT head neg. Double vision 11/21 Neuro has seen patient. Concerns for possible 6th nerve palsy. Recommends LP. We will hold with WBC 0.0 and continue to monitor. 11/22 vision improved today. MRI brain pending. 11/23 MRI unremarkable. Discussed with Neuro. No need for LP Continue home meds Prophylactic Antibx: Acyclovir, Voriconazole [facility] SOPs SCDs for DVT prophylaxis (AC contraindicated d/t thrombocytopenia) Goals and plan of care reviewed with the patient.   All questions answered to the best of our ability. Disposition:  Awaiting count recovery. Will need BMbx prior to discharge but not prior to day 28 chemo. Upon discharge will need twice weekly labs w transfusions as needed. Weekly provider visits. RTC within 1 week from discharge. Encourage use of IS. Irais Mireles, [facility] [id number] [place] [address] Office : [phone] Fax : [phone] I personally saw, exammed and counselled the patient, and discussed with NP, agree with above history/assessment/plan. 68 y. o.female AML with FLT3 mut s/p multiple lines of chemo and Gilteritinib but refractory, received salvage FLAG-VENITA, day 19, strep A and enterococcus sepsis controlled, ANC still 0, rash likely related to antibiotics, give antihistamine and rash fainted, ANC 0, changed vanc to zosyn, increase diarrhea control, b/l lung basilar crackles, use incentive spirometry, supportive care per SOP. 
  
 
Hakeem Reynaga M.D. [facility] [address] [place] [address] Office : [phone] Fax : [phone]

## 2019-11-27 LAB
ALBUMIN SERPL-MCNC: 1.8 G/DL (ref 3.2–4.6)
ALBUMIN/GLOB SERPL: 0.5 {RATIO} (ref 1.2–3.5)
ALP SERPL-CCNC: 74 U/L (ref 50–136)
ALT SERPL-CCNC: 25 U/L (ref 12–65)
ANION GAP SERPL CALC-SCNC: 5 MMOL/L (ref 7–16)
AST SERPL-CCNC: 13 U/L (ref 15–37)
BILIRUB SERPL-MCNC: 0.4 MG/DL (ref 0.2–1.1)
BUN SERPL-MCNC: 14 MG/DL (ref 8–23)
C DIFF GDH STL QL: NORMAL
C DIFF TOX A+B STL QL IA: NORMAL
CALCIUM SERPL-MCNC: 8.3 MG/DL (ref 8.3–10.4)
CHLORIDE SERPL-SCNC: 105 MMOL/L (ref 98–107)
CLINICAL CONSIDERATION: NORMAL
CO2 SERPL-SCNC: 33 MMOL/L (ref 21–32)
CREAT SERPL-MCNC: 0.57 MG/DL (ref 0.6–1)
DIFFERENTIAL METHOD BLD: ABNORMAL
ERYTHROCYTE [DISTWIDTH] IN BLOOD BY AUTOMATED COUNT: 13.4 % (ref 11.9–14.6)
GLOBULIN SER CALC-MCNC: 3.8 G/DL (ref 2.3–3.5)
GLUCOSE SERPL-MCNC: 97 MG/DL (ref 65–100)
HCT VFR BLD AUTO: 23.2 % (ref 35.8–46.3)
HGB BLD-MCNC: 7.8 G/DL (ref 11.7–15.4)
INTERPRETATION: NORMAL
LDH SERPL L TO P-CCNC: 213 U/L (ref 110–210)
MAGNESIUM SERPL-MCNC: 1.8 MG/DL (ref 1.8–2.4)
MCH RBC QN AUTO: 29.8 PG (ref 26.1–32.9)
MCHC RBC AUTO-ENTMCNC: 33.6 G/DL (ref 31.4–35)
MCV RBC AUTO: 88.5 FL (ref 79.6–97.8)
NRBC # BLD: 0 K/UL (ref 0–0.2)
O+P SPEC MICRO: NORMAL
O+P STL CONC: NORMAL
PCR REFLEX: NORMAL
PHOSPHATE SERPL-MCNC: 3.5 MG/DL (ref 2.3–3.7)
PLATELET # BLD AUTO: 12 K/UL (ref 150–450)
PLATELET COMMENTS,PCOM: ABNORMAL
PMV BLD AUTO: 10 FL (ref 9.4–12.3)
POTASSIUM SERPL-SCNC: 3.7 MMOL/L (ref 3.5–5.1)
PROT SERPL-MCNC: 5.6 G/DL (ref 6.3–8.2)
RBC # BLD AUTO: 2.62 M/UL (ref 4.05–5.2)
RBC MORPH BLD: ABNORMAL
SODIUM SERPL-SCNC: 143 MMOL/L (ref 136–145)
SPECIMEN SOURCE: NORMAL
URATE SERPL-MCNC: 1 MG/DL (ref 2.6–6)
WBC # BLD AUTO: 0 K/UL (ref 4.3–11.1)
WBC MORPH BLD: ABNORMAL

## 2019-11-27 PROCEDURE — 99232 SBSQ HOSP IP/OBS MODERATE 35: CPT | Performed by: INTERNAL MEDICINE

## 2019-11-27 PROCEDURE — 84100 ASSAY OF PHOSPHORUS: CPT

## 2019-11-27 PROCEDURE — 74011250636 HC RX REV CODE- 250/636: Performed by: INTERNAL MEDICINE

## 2019-11-27 PROCEDURE — 83735 ASSAY OF MAGNESIUM: CPT

## 2019-11-27 PROCEDURE — 74011250637 HC RX REV CODE- 250/637: Performed by: INTERNAL MEDICINE

## 2019-11-27 PROCEDURE — 0107U C DIFF TOX AG DETCJ IA STOOL: CPT

## 2019-11-27 PROCEDURE — 65270000015 HC RM PRIVATE ONCOLOGY

## 2019-11-27 PROCEDURE — 80053 COMPREHEN METABOLIC PANEL: CPT

## 2019-11-27 PROCEDURE — 84550 ASSAY OF BLOOD/URIC ACID: CPT

## 2019-11-27 PROCEDURE — 85025 COMPLETE CBC W/AUTO DIFF WBC: CPT

## 2019-11-27 PROCEDURE — 74011000258 HC RX REV CODE- 258: Performed by: INTERNAL MEDICINE

## 2019-11-27 PROCEDURE — 74011250637 HC RX REV CODE- 250/637: Performed by: NURSE PRACTITIONER

## 2019-11-27 PROCEDURE — 83615 LACTATE (LD) (LDH) ENZYME: CPT

## 2019-11-27 PROCEDURE — 65270000029 HC RM PRIVATE

## 2019-11-27 RX ADMIN — CAMPHOR AND MENTHOL: 5; 5 LOTION TOPICAL at 17:27

## 2019-11-27 RX ADMIN — PIPERACILLIN AND TAZOBACTAM 3.38 G: 3; .375 INJECTION, POWDER, FOR SOLUTION INTRAVENOUS at 11:55

## 2019-11-27 RX ADMIN — Medication 2 TABLET: at 11:55

## 2019-11-27 RX ADMIN — LOPERAMIDE HYDROCHLORIDE 2 MG: 2 CAPSULE ORAL at 19:49

## 2019-11-27 RX ADMIN — TEMAZEPAM 15 MG: 15 CAPSULE ORAL at 21:45

## 2019-11-27 RX ADMIN — VORICONAZOLE 200 MG: 200 TABLET, FILM COATED ORAL at 07:55

## 2019-11-27 RX ADMIN — PIPERACILLIN AND TAZOBACTAM 3.38 G: 3; .375 INJECTION, POWDER, FOR SOLUTION INTRAVENOUS at 01:26

## 2019-11-27 RX ADMIN — ACYCLOVIR 400 MG: 800 TABLET ORAL at 17:25

## 2019-11-27 RX ADMIN — PRAVASTATIN SODIUM 10 MG: 20 TABLET ORAL at 21:46

## 2019-11-27 RX ADMIN — CAMPHOR AND MENTHOL: 5; 5 LOTION TOPICAL at 21:47

## 2019-11-27 RX ADMIN — LORAZEPAM 1 MG: 1 TABLET ORAL at 21:45

## 2019-11-27 RX ADMIN — POTASSIUM CHLORIDE 20 MEQ: 20 TABLET, EXTENDED RELEASE ORAL at 17:25

## 2019-11-27 RX ADMIN — DULOXETINE 30 MG: 30 CAPSULE, DELAYED RELEASE ORAL at 08:00

## 2019-11-27 RX ADMIN — PIPERACILLIN AND TAZOBACTAM 3.38 G: 3; .375 INJECTION, POWDER, FOR SOLUTION INTRAVENOUS at 17:24

## 2019-11-27 RX ADMIN — LORAZEPAM 0.5 MG: 2 INJECTION INTRAMUSCULAR; INTRAVENOUS at 12:01

## 2019-11-27 RX ADMIN — TBO-FILGRASTIM 480 MCG: 480 INJECTION, SOLUTION SUBCUTANEOUS at 21:46

## 2019-11-27 RX ADMIN — ACYCLOVIR 400 MG: 800 TABLET ORAL at 07:55

## 2019-11-27 RX ADMIN — VORICONAZOLE 200 MG: 200 TABLET, FILM COATED ORAL at 21:46

## 2019-11-27 RX ADMIN — POTASSIUM CHLORIDE 20 MEQ: 20 TABLET, EXTENDED RELEASE ORAL at 07:55

## 2019-11-27 RX ADMIN — CAMPHOR AND MENTHOL: 5; 5 LOTION TOPICAL at 07:56

## 2019-11-27 RX ADMIN — ALLOPURINOL 300 MG: 300 TABLET ORAL at 07:55

## 2019-11-27 RX ADMIN — LORATADINE 10 MG: 10 TABLET ORAL at 07:55

## 2019-11-27 NOTE — PROGRESS NOTES
[facility] [employer] [facility] Inpatient Hematology / Oncology Daily Progress Note Reason for Admission:  Admission for antineoplastic chemotherapy [Z51.11] 24 Hour Events: 
BCx- + enterococcus/streptococcus Repeat BC NTD Day 20 FLAG-VENITA Awaiting count recovery, on Granix Echo neg for veg. ROS: 
Constitutional: +fatigue -fever CV: Negative for chest pain, palpitations, edema. Respiratory: Negative for dyspnea, cough, wheezing. GI: Negative for nausea, abdominal pain. Diarrhea. 10 point review of systems is otherwise negative with the exception of the elements mentioned above in the HPI. No Known Allergies Past Medical History:  
Diagnosis Date  AML (acute myeloid leukemia) ([facility] [address] dx- 4/2019  
 followed by dr Laura Aguiar  Depression  Hypercholesterolemia  Infection   
 of port -- was placed 5/2019-- removed 6/2019---right chest  
 Psychiatric disorder   
 aniexty  Sleep apnea Past Surgical History:  
Procedure Laterality Date  HX OTHER SURGICAL    
 colonoscopy  HX VASCULAR ACCESS    
 IR INSERT TUNL CVC W PORT OVER 5 YEARS  4/30/2019  IR INSERT TUNL CVC W PORT OVER 5 YEARS  7/15/2019  IR REMOVE TUNL CVAD W PORT/PUMP  6/13/2019 Family History Problem Relation Age of Onset  Cancer Father Social History Socioeconomic History  Marital status:  Spouse name: Not on file  Number of children: Not on file  Years of education: Not on file  Highest education level: Not on file Occupational History  Not on file Social Needs  Financial resource strain: Not on file  Food insecurity:  
  Worry: Not on file Inability: Not on file  Transportation needs:  
  Medical: Not on file Non-medical: Not on file Tobacco Use  Smoking status: Never Smoker  Smokeless tobacco: Never Used Substance and Sexual Activity  Alcohol use: Never Frequency: Never  Drug use: Never  Sexual activity: Not on file Lifestyle  Physical activity:  
  Days per week: Not on file Minutes per session: Not on file  Stress: Not on file Relationships  Social connections:  
  Talks on phone: Not on file Gets together: Not on file Attends Zoroastrianism service: Not on file Active member of club or organization: Not on file Attends meetings of clubs or organizations: Not on file Relationship status: Not on file  Intimate partner violence:  
  Fear of current or ex partner: Not on file Emotionally abused: Not on file Physically abused: Not on file Forced sexual activity: Not on file Other Topics Concern [address] Service Not Asked  Blood Transfusions Not Asked  Caffeine Concern Not Asked  Occupational Exposure Not Asked Angelina Elida Hazards Not Asked  Sleep Concern Not Asked  Stress Concern Not Asked  Weight Concern Not Asked  Special Diet Not Asked  Back Care Not Asked  Exercise Not Asked  Bike Helmet Not Asked  Seat Belt Not Asked  Self-Exams Not Asked Social History Narrative  Not on file Current Facility-Administered Medications Medication Dose Route Frequency Provider Last Rate Last Dose  temazepam (RESTORIL) capsule 15 mg  15 mg Oral QHS Christine Jay NP   15 mg at 11/26/19 2150  
 0.9% sodium chloride infusion 250 mL  250 mL IntraVENous PRN Radha Knight MD      
 camphor-menthol ANNAMARIE [facility]) 0.5-0.5 % lotion   Topical TID Armin Dodge NP      
 piperacillin-tazobactam (ZOSYN) 3.375 g in 0.9% sodium chloride (MBP/ADV) 100 mL  3.375 g IntraVENous Q8H Joey Oden MD 25 mL/hr at 11/27/19 0126 3.375 g at 11/27/19 0126  
 0.9% sodium chloride infusion 250 mL  250 mL IntraVENous PRN Radha Knight MD      
 sodium chloride 0.9 % bolus infusion 500 mL  500 mL IntraVENous QHS Armin Dodge NP   500 mL at 11/21/19 2315  baclofen (LIORESAL) tablet 5 mg  5 mg Oral Q8H PRN Radha Knight MD   5 mg at 11/21/19 1335  potassium chloride (K-DUR, KLOR-CON) SR tablet 20 mEq  20 mEq Oral BID Kerry Minor MD   20 mEq at 11/27/19 6660  albuterol (PROVENTIL VENTOLIN) nebulizer solution 2.5 mg  2.5 mg Nebulization Q6H PRN PierreChristine sy NP   2.5 mg at 11/21/19 1600  
 alum-mag hydroxide-simeth (MYLANTA) oral suspension 30 mL  30 mL Oral Q4H PRN Kerry Minor MD   30 mL at 11/21/19 9371  loperamide (IMODIUM) capsule 2 mg  2 mg Oral Q4H PRN Kerry Minor MD   2 mg at 11/26/19 8221  loratadine (CLARITIN) tablet 10 mg  10 mg Oral DAILY Gibran Nettles, NP   10 mg at 11/27/19 0513  central line flush (saline) syringe 10 mL  10 mL InterCATHeter PRN Kerry Minor MD   10 mL at 11/18/19 2110  
 docusate sodium (COLACE) capsule 100 mg  100 mg Oral BID PRN Myah Cadena NP   100 mg at 11/11/19 1308  LORazepam (ATIVAN) injection 0.5 mg  0.5 mg IntraVENous Q6H PRN Kerry Minor MD   0.5 mg at 11/26/19 1447  saline peripheral flush soln 10 mL  10 mL InterCATHeter PRN Kerry Minor MD   10 mL at 11/23/19 2229  
 heparin (porcine) pf 300-500 Units  300-500 Units InterCATHeter PRN Kerry Minor MD      
 tbo-filgrastim [facility]) injection 480 mcg  480 mcg SubCUTAneous QHS Kerry Minor MD   480 mcg at 11/26/19 2150  
 acyclovir (ZOVIRAX) tablet 400 mg  400 mg Oral BID Myah Cadena NP   400 mg at 11/27/19 6841  allopurinol (ZYLOPRIM) tablet 300 mg  300 mg Oral DAILY Myah Cadena NP   300 mg at 11/27/19 1592  cholecalciferol (VITAMIN D3) (400 Units /10 mcg) tablet 2 Tab  800 Units Oral DAILY Myah Cadena NP   2 Tab at 11/26/19 8692  DULoxetine (CYMBALTA) capsule 30 mg  30 mg Oral DAILY Myah Sanes, NP   30 mg at 11/27/19 0800  lidocaine-prilocaine (EMLA) 2.5-2.5 % cream   Topical PRN Myah Sanes, NP      
 LORazepam (ATIVAN) tablet 1 mg  1 mg Oral QHS Myah Sanes, NP   1 mg at 11/26/19 2150  pravastatin (PRAVACHOL) tablet 10 mg  10 mg Oral QHS Myah Sanes, NP   10 mg at 11/26/19 2150  voriconazole (VFEND) tablet 200 mg  200 mg Oral Q12H Ede Russell, NP   200 mg at 19 4363  ondansetron (ZOFRAN) injection 4 mg  4 mg IntraVENous Q4H PRN Ede Russell, NP   4 mg at 19 1153  prochlorperazine (COMPAZINE) with saline injection 5 mg  5 mg IntraVENous Q6H PRN Ede Russell, NP   5 mg at 19 1632  
 HYDROcodone-acetaminophen (NORCO) 5-325 mg per tablet 1 Tab  1 Tab Oral Q6H PRN Ede Russell, NP   1 Tab at 19 0197  morphine injection 2 mg  2 mg IntraVENous Q4H PRN Ede Russell, NP      
 acetaminophen (TYLENOL) tablet 650 mg  650 mg Oral Q6H PRN Dominique Ochoa MD   650 mg at 19 1144  diphenhydrAMINE (BENADRYL) capsule 25 mg  25 mg Oral Q6H PRN Dominique Ochoa MD   25 mg at 19 2140  acetaminophen/diphenhydrAMINE (TYLENOL PM EXT STR) 500/25 mg (Patient Supplied)   Oral QHS Dominique Ochoa MD      
 vit C,P-Ua-dtyco-lutein-zeaxan (PRESERVISION AREDS-2) capsule 1 Cap (Patient Supplied)  [address] Dominique Ochoa MD   1 Cap at 19 5943 OBJECTIVE: 
Patient Vitals for the past 8 hrs: 
 BP Temp Pulse Resp SpO2  
19 0904 142/69 99.1 °F (37.3 °C) 93 20 94 % 19 0449 147/65 98.8 °F (37.1 °C) 91 18 92 % Temp (24hrs), Av °F (37.2 °C), Min:98 °F (36.7 °C), Max:99.9 °F (37.7 °C) No intake/output data recorded. Physical Exam: 
Constitutional: Well developed, frail appearing female in no acute distress, sitting in bed HEENT: Normocephalic and atraumatic. Oropharynx is clear, mucous membranes are moist. Extraocular muscles are intact. Sclerae anicteric. Skin Warm and dry. Scattered rash. No erythema. No pallor. Bruising noted on BUE/R elbow and abdomen. Respiratory Crackles at bases,  normal air exchange without accessory muscle use. CVS Normal rate, regular rhythm and normal S1 and S2. No murmurs, gallops, or rubs. Abdomen Soft, mildly tender across lower abd-improving, nondistended, normoactive bowel sounds. Neuro Grossly nonfocal with no obvious sensory or motor deficits. MSK Normal range of motion in general.  No edema and no tenderness. Psych Appropriate mood and affect. Labs:   
Recent Results (from the past 24 hour(s)) METABOLIC PANEL, COMPREHENSIVE Collection Time: 11/27/19  5:19 AM  
Result Value Ref Range Sodium 143 136 - 145 mmol/L Potassium 3.7 3.5 - 5.1 mmol/L Chloride 105 98 - 107 mmol/L  
 CO2 33 (H) 21 - 32 mmol/L Anion gap 5 (L) 7 - 16 mmol/L Glucose 97 65 - 100 mg/dL BUN 14 8 - 23 MG/DL Creatinine 0.57 (L) 0.6 - 1.0 MG/DL  
 GFR est AA >60 >60 ml/min/1.73m2 GFR est non-AA >60 >60 ml/min/1.73m2 Calcium 8.3 8.3 - 10.4 MG/DL Bilirubin, total 0.4 0.2 - 1.1 MG/DL  
 ALT (SGPT) 25 12 - 65 U/L  
 AST (SGOT) 13 (L) 15 - 37 U/L Alk. phosphatase 74 50 - 136 U/L Protein, total 5.6 (L) 6.3 - 8.2 g/dL Albumin 1.8 (L) 3.2 - 4.6 g/dL Globulin 3.8 (H) 2.3 - 3.5 g/dL A-G Ratio 0.5 (L) 1.2 - 3.5 MAGNESIUM Collection Time: 11/27/19  5:19 AM  
Result Value Ref Range Magnesium 1.8 1.8 - 2.4 mg/dL LD Collection Time: 11/27/19  5:19 AM  
Result Value Ref Range  (H) 110 - 210 U/L  
URIC ACID Collection Time: 11/27/19  5:19 AM  
Result Value Ref Range Uric acid 1.0 (L) 2.6 - 6.0 MG/DL  
PHOSPHORUS Collection Time: 11/27/19  5:19 AM  
Result Value Ref Range Phosphorus 3.5 2.3 - 3.7 MG/DL  
CBC WITH AUTOMATED DIFF Collection Time: 11/27/19  5:19 AM  
Result Value Ref Range WBC 0.0 (LL) 4.3 - 11.1 K/uL  
 RBC 2.62 (L) 4.05 - 5.2 M/uL HGB 7.8 (L) 11.7 - 15.4 g/dL HCT 23.2 (L) 35.8 - 46.3 % MCV 88.5 79.6 - 97.8 FL  
 MCH 29.8 26.1 - 32.9 PG  
 MCHC 33.6 31.4 - 35.0 g/dL  
 RDW 13.4 11.9 - 14.6 % PLATELET 12 (LL) 614 - 450 K/uL MPV 10.0 9.4 - 12.3 FL ABSOLUTE NRBC 0.00 0.0 - 0.2 K/uL  
 RBC COMMENTS MODERATE 
ROULEAUX 
    
 WBC COMMENTS WBC TOO FEW TO DIFFERENTIATE PLATELET COMMENTS MARKED    
 DF MANUAL Imaging: 
CT HEAD WO CONT [[id number]] Collected: 11/19/19 1050 Order Status: Completed Updated: 11/19/19 1054 Narrative:    
CT HEAD WITHOUT CONTRAST, 11/19/2019 History: Fall last night hitting left side of head Comparison: . Technique:   5 mm axial scans from the skull base to the vertex. All CT scans 
performed at this facility use one or all of the following: Automated exposure 
control, adjustment of the mA and/or kVp according to patient's size, iterative 
reconstruction. Findings:  No evidence of intracranial hemorrhage is seen. Faint bilateral basal 
ganglia calcifications are seen. No abnormal extra-axial fluid collections are 
seen.  Mild cortical involutional changes are seen which are not felt to be 
abnormal given the patient's age. Hodan Daly evidence for acute hydrocephalus is seen. No evidence of midline shift or herniation is seen.  No abnormal edema pattern 
is seen in a vascular distribution to suggest large artery infarction. Evaluation with bone windows shows no acute osseous changes.  The visualized 
sinuses, middle ears, and mastoid air cells are well aerated. Impression:    
IMPRESSION:   
1.  No acute intracranial process evident by noncontrast CT study of the head. XR CHEST SNGL V [[id number]] Collected: 11/18/19 2041 Order Status: Completed Updated: 11/18/19 2044 Narrative:    
EXAM: XR CHEST SNGL V 
 
INDICATION: neutropenic fever COMPARISON: 9/29/2019 FINDINGS: A portable AP radiograph of the chest was obtained at 1946 hours. The 
patient is on a cardiac monitor. The lungs are clear. The cardiac and 
mediastinal contours and pulmonary vascularity are normal.  The bones and soft 
tissues are grossly within normal limits. Impression:    
IMPRESSION: Normal chest.  
XR ELBOW RT MIN 3 V [[id number]] Collected: 11/10/19 1421 Order Status: Completed Updated: 11/10/19 1424 Narrative:    
Right elbow Clinical location: Elbow pain after feeling a pop, decreased range of motion FINDINGS: Four views of the right elbow show no fracture or dislocation. There 
is no joint effusion. The soft tissues are unremarkable. Impression:    
IMPRESSION: No acute osseous abnormality or joint derangement of the right 
elbow. ASSESSMENT: 
Problem List  Date Reviewed: 10/31/2019 Codes Class Noted Febrile neutropenia (HCC) ICD-10-CM: D70.9, R50.81 ICD-9-CM: 288.00, 780.61  9/21/2019 Pancytopenia due to antineoplastic chemotherapy [facility]) ICD-10-CM: D61.810, T45.1X5A 
ICD-9-CM: 284.11, E933.1  6/12/2019 Cellulitis of neck ICD-10-CM: K99.563 ICD-9-CM: 682.1  6/12/2019 Immunocompromised status associated with infection ICD-10-CM: B99.9 ICD-9-CM: 136.9  6/12/2019 Port or reservoir infection ICD-10-CM: H59.471S ICD-9-CM: 999.33  6/12/2019 Acute myeloid leukemia not having achieved remission ([facility] ICD-10-CM: C92.00 ICD-9-CM: 205.00  5/9/2019 Admission for antineoplastic chemotherapy ICD-10-CM: Z51.11 ICD-9-CM: V58.11  5/5/2019 AML (acute myeloblastic leukemia) ([facility] [address] ICD-10-CM: C92.00 ICD-9-CM: 205.00  4/28/2019 Weakness generalized ICD-10-CM: R53.1 ICD-9-CM: 780.79  4/28/2019 Pancytopenia ([facility] [address] ICD-10-CM: V80.852 ICD-9-CM: 284.19  4/28/2019 Thrombocytopenia ([facility] ICD-10-CM: D69.6 ICD-9-CM: 287.5  4/27/2019 Ms. Braden Oconnor is a 68 y.o. female admitted on 11/7/2019. She is a known patient of Dr. 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 She failed induction with 7+3/Midostaurin. On Dacogen/Gilteritinib with recent BMbx with persistent residual disease. She is being admitted for FLAG-VENITA. She is feeling well and is ready to proceed with treatment. PLAN: 
AML 
- admit for salvage FLAG-VENITA 
- needs Echo prior - ordered 11/8 Day 1 FLAG-VENITA. Echo with EF 55-60%, proceed with tx. 11/9 Day 2 FLAG-VENITA. Tolerating well, no issues. Uric acid 3.1 today. 11/10 Day 3 FLAG-VENITA. No issues with treatment. Uric acid 3.3 today. 11/12 Day 5 FLAG-VENITA. Tolerating treatment well. G-CSF to start tomorrow. 11/13 Day 6 FLAG-VENITA, doing well, Granix/claritin starts today 11/19 Day 12 FLAG-VENITA. Awaiting count recovery, con't Granix. 11/20 Day 13 FLAG-VENITA. WBC 0. Continue Granix. 11/27 Day 20 FLAG VENITA. WBC 0.0 Pancytopenia secondary to disease - Transfuse prn per [facility] SOPs 
11/19 Transfuse PRBCs today R elbow pain 11/11 c/o R elbow pain with limited ROM yesterday. Xray neg. Pain improved today, noted bruising. Normal ROM on exam. 
 
Mild Abd discomfort/loose stools 11/13 discomfort is improved from yesterday, will monitor 11/14 loose stools, but not watery, can use Imodium prn 
11/15 Imodium working well  
11/16 controlled 11/26 Ova and parasite 11/22 pending. Check for C diff if stool appropriate. Continue anti diarrheal for now Neutropenic fever / UTI 
11/19 Tmax 102. 1.  UA +UTI. UCx pending. BCx-NGTD. CXR neg. On Cef/Vanc. DC prophylactic LVQ. 11/20 Tmax 102. BCx positive for streptococcus/enterococcus. Subculture in progress. On Cef/Vanc.   
11/22 repeat BC NTD. Alpha strep on vanc. 11/24 Repeat BC NTD. No fever. 11/25 new skin rash. ?RT to vanc. Consult ID for recommendations. 11/26 ID dc'd cefe and vanc. Started zosyn. TTE ordered. Sepsis not present on admission 11/21  BCx positive for streptococcus/enterococcus. Fall 
11/19 s/p last PM.  No LOC. Hit head. CT head neg. Double vision 11/21 Neuro has seen patient. Concerns for possible 6th nerve palsy. Recommends LP. We will hold with WBC 0.0 and continue to monitor. 11/22 vision improved today. MRI brain pending. 11/23 MRI unremarkable. Discussed with Neuro. No need for LP 
11/27 Resolved Continue home meds Prophylactic Antibx: Acyclovir, Voriconazole [facility] SOPs SCDs for DVT prophylaxis (AC contraindicated d/t thrombocytopenia) Goals and plan of care reviewed with the patient. All questions answered to the best of our ability. Disposition:  Awaiting count recovery. Will need BMbx prior to discharge but not prior to day 28 chemo. Upon discharge will need twice weekly labs w transfusions as needed. Weekly provider visits. RTC within 1 week from discharge. Encourage use of IS. [facility] [id number] [place] [address] Office : [phone] Fax : [phone] I personally saw, exammed and counselled the patient, and discussed with NP, agree with above history/assessment/plan. 73 y. o.female AML with FLT3 mut s/p multiple lines of chemo and Gilteritinib but refractory, received salvage FLAG-VENITA, day 20, strep A and enterococcus sepsis controlled, ANC still 0, rash likely related to antibiotics, give antihistamine and rash fainted, ANC 0, changed to zosyn, increased diarrhea control, b/l lung basilar crackles, use incentive spirometry, supportive care per SOP. 
  
 
Rodriguez Rahman M.D. [place] [address] [address] Office : [phone] Fax : [phone]

## 2019-11-27 NOTE — PROGRESS NOTES
Problem: Falls - Risk of 
Goal: *Absence of Falls Description Document Karla Martin Fall Risk and appropriate interventions in the flowsheet. Outcome: Progressing Towards Goal 
Note: Fall Risk Interventions: 
Mobility Interventions: Communicate number of staff needed for ambulation/transfer, Patient to call before getting OOB Medication Interventions: Assess postural VS orthostatic hypotension, Patient to call before getting OOB, Teach patient to arise slowly Elimination Interventions: Call light in reach, Patient to call for help with toileting needs History of Falls Interventions: Door open when patient unattended, Room close to nurse's station Problem: Patient Education: Go to Patient Education Activity Goal: Patient/Family Education Outcome: Progressing Towards Goal 
  
Problem: Anemia Care Plan (Adult and Pediatric) Goal: *Labs within defined limits Outcome: Progressing Towards Goal 
Goal: *Tolerates increased activity Outcome: Progressing Towards Goal 
  
Problem: Patient Education: Go to Patient Education Activity Goal: Patient/Family Education Outcome: Progressing Towards Goal 
  
Problem: Diarrhea (Adult and Pediatrics) Goal: *Absence of diarrhea Outcome: Progressing Towards Goal 
  
Problem: Nausea/Vomiting (Adult) Goal: *Absence of nausea/vomiting Outcome: Progressing Towards Goal 
  
Problem: Patient Education: Go to Patient Education Activity Goal: Patient/Family Education Outcome: Progressing Towards Goal 
  
Problem: Nutrition Deficit Goal: *Optimize nutritional status Outcome: Progressing Towards Goal 
  
Problem: Patient Education: Go to Patient Education Activity Goal: Patient/Family Education Outcome: Progressing Towards Goal

## 2019-11-27 NOTE — PROGRESS NOTES
0700-Bedside report received from Eric Moore RN. Resting in bed. No needs voiced. No s/s of acute distress. 1820-END OF SHIFT NOTE: 
Pt's VSS and is in no acute distress. Pt receiving zosyn. Pt still having occasional nausea. Intake/Output 11/27 0701 - 11/27 1900 In: 665.7 [I.V.:665.7] Out: -   
Voiding: YES Catheter: NO 
Drain:   
 
Stool:  0 occurrences. Stool Assessment Stool Color: Brown (11/26/19 1400) Stool Appearance: Watery (11/26/19 1400) Stool Amount: Small (11/26/19 1400) Stool Source/Status: Rectum (11/26/19 1400) Emesis:  0 occurrences. VITAL SIGNS Patient Vitals for the past 12 hrs: 
 Temp Pulse Resp BP SpO2  
11/27/19 1219 98.6 °F (37 °C) (!) 108 20 97/58 93 % 11/27/19 0904 99.1 °F (37.3 °C) 93 20 142/69 94 % Pain Assessment Pain 1 Pain Scale 1: Numeric (0 - 10) (11/27/19 0904) Pain Intensity 1: 0 (11/27/19 0904) Patient Stated Pain Goal: 0 (11/27/19 0449) Pain Reassessment 1: Yes (11/24/19 1025) Pain Onset 1: suddenly (11/24/19 1000) Pain Location 1: Abdomen (11/24/19 1000) Pain Orientation 1: Left;Right; Anterior (11/24/19 1000) Pain Description 1: Cramping (11/24/19 1000) Pain Intervention(s) 1: Medication (see MAR) (11/24/19 1000) Ambulating Yes Chuy Rai 1855-Bedside shift change report given to [address] [place] [address] (oncoming nurse) by Cleo Jimenez RN (offgoing nurse). Report included the following information SBAR, Kardex, Intake/Output, MAR and Recent Results.

## 2019-11-27 NOTE — PROGRESS NOTES
Infectious Disease Progress Note Today's Date: 2019 Admit Date: 2019 Impression: · E faecalis bacteremia/Alpha strep (); repeat blood cx () NG ; TTE (-); source Port vs GI 
· AML; admitted for salvage [facility] · Febrile neutropenia · Pancytopenia · R elbow pain resolved · S/P fall ; CT head (-) · Double vision; Neuro has seen; ? 6th nerve palsy; symptoms improved nowt · Rash; I don't think this is a drug rash · Diarrhea; chronic Plan:  
· Continue Zosyn to treat Enterococcus /strep bacteremia : EOT 12/3/19 · ID will follow-up Friday unless called Anti-infectives: · Fatoumata Kings · ACV 
· IV Vancomycin (-) · IV Cefepime (-) · PO LVQ (-) · IV Zosyn (- Subjective: No fevers. Not able to eat much. After eating reports low volume loose stools. ~4-5 pudding stools every day w/o Imodium No Known Allergies Review of Systems:  A comprehensive review of systems was negative except for that written in the History of Present Illness. Objective:  
 
Visit Vitals /69 (BP 1 Location: Left arm, BP Patient Position: At rest) Pulse 93 Temp 99.1 °F (37.3 °C) Resp 20 Ht 5' 6\" (1.676 m) Wt 86.4 kg (190 lb 6.4 oz) SpO2 94% BMI 30.73 kg/m² Temp (24hrs), Av °F (37.2 °C), Min:98 °F (36.7 °C), Max:99.9 °F (37.7 °C) Lines:  Left chest port:    
 
Physical Exam:   
General:  Alert, cooperative, well noursished, well developed, appears stated age Eyes:  Sclera anicteric. Pupils equally round and reactive to light. Mouth/Throat: Mucous membranes normal, oral pharynx clear Neck: Supple Lungs:   Clear to auscultation bilaterally, good effort CV:  Regular rate and rhythm,no murmur, click, rub or gallop Abdomen:   Soft, non-tender. bowel sounds hyperactive. mildly-distended Extremities: No cyanosis or edema Skin: Skin color, texture, turgor normal. diffuse rash Upper and lower extremities, face; back not characteristic of drug rash; no changes noted Lymph nodes: Cervical and supraclavicular normal  
Musculoskeletal: No swelling or deformity Lines/Devices:  Intact, no erythema, drainage or tenderness Psych: Alert and oriented, normal mood affect given the setting Data Review: CBC: 
Recent Labs  
  11/27/19 0519 11/26/19 0516 11/25/19 0435 WBC 0.0* 0.0* 0.0* GRANS  --   --  100* MONOS  --   --  0* EOS  --   --  0* ANEU  --   --  0.0* ABL  --   --  0.0* HGB 7.8* 8.0* 6.9*  
HCT 23.2* 23.4* 20.7* PLT 12* 21* 34* BMP: 
Recent Labs  
  11/27/19 0519 11/26/19 0516 11/25/19 0435 CREA 0.57* 0.63 0.51* BUN 14 12 14  141 145  
K 3.7 3.6 3.8  107 111* CO2 33* 30 29 AGAP 5* 4* 5*  
GLU 97 86 88 LFTS: 
Recent Labs  
  11/27/19 0519 11/26/19 0516 11/25/19 0435 TBILI 0.4 0.4 0.4 ALT 25 27 27 SGOT 13* 13* 16  
AP 74 72 66  
TP 5.6* 5.7* 5.4* ALB 1.8* 1.8* 1.8* Microbiology:  
 
All Micro Results Procedure Component Value Units Date/Time C. DIFFICILE AG & TOXIN A/B [[id number]] Order Status:  Sent Specimen:  Stool CULTURE, BLOOD [[id number]] Collected:  11/21/19 0106 Order Status:  Completed Specimen:  Blood Updated:  11/26/19 5701 Special Requests: --     
  RIGHT 
HAND Culture result: NO GROWTH 5 DAYS     
 CULTURE, BLOOD [[id number]] Collected:  11/20/19 2028 Order Status:  Completed Specimen:  Blood Updated:  11/25/19 6966 Special Requests: PORT Culture result: NO GROWTH 5 DAYS     
 CULTURE, STOOL [[id number]] Collected:  11/22/19 0040 Order Status:  Completed Specimen:  Stool Updated:  11/24/19 7866 Special Requests: NO SPECIAL REQUESTS Culture result:    
  No Salmonella, Shigella, or Ecoli 0157 isolated. NO GROWTH OF GRAM NEGATIVE FECAL REGGIE,  
     
 CULTURE, BLOOD [[id number]] Collected:  11/18/19 2032 Order Status:  Completed Specimen:  Blood Updated:  11/23/19 9800 Special Requests: --     
  RIGHT 
HAND Culture result: NO GROWTH 5 DAYS     
 OVA & PARASITES, STOOL [[id number]] Collected:  11/22/19 6839 Order Status:  Completed Specimen:  Stool Updated:  11/22/19 7705 CULTURE, BLOOD [[id number]]  (Abnormal)  (Susceptibility) Collected:  11/18/19 1955 Order Status:  Completed Specimen:  Blood Updated:  11/22/19 0730 Special Requests: LATERAL PORT     
  GRAM STAIN GRAM POS COCCI IN CHAINS AEROBIC AND ANAEROBIC BOTTLES RESULTS VERIFIED, PHONED TO AND READ BACK BY EJ CORREA RN @ 3298 ON 11/19/2019 BY [facility] Culture result:    
  ENTEROCOCCUS FAECALIS GROUP D ALPHA STREPTOCOCCUS, NOT S. PNEUMONIAE THIS ORGANISM MAY BE INDICATIVE OF CULTURE CONTAMINATION, HOWEVER, CLINICAL CORRELATION NEEDS TO BE EVALUATED, AS EACH CASE IS UNIQUE. REFER TO [facility] Renzo Mendoza PANEL [id number] CULTURE, URINE [[id number]] Collected:  11/19/19 0102 Order Status:  Completed Specimen:  Urine from Clean catch Updated:  11/21/19 0710 Special Requests: NO SPECIAL REQUESTS Culture result: NO GROWTH 2 DAYS     
 BLOOD CULTURE ID PANEL [[id number]]  (Abnormal) Collected:  11/18/19 1955 Order Status:  Completed Specimen:  Blood Updated:  11/19/19 1420 Acc. no. from [employer] [id number] Enterococcus DETECTED Streptococcus DETECTED Comment: RESULTS VERIFIED, PHONED TO AND READ BACK BY 
EJ CORREA RN @ 6366 ON 11/19/2019 BY [facility] 
  
  
  Cici ALMODOVAR/ZARIA (Vancomycin Resistance Gene) NOT DETECTED Clinical Consideration Multiple organisms detected. Results do not replace susceptibility testing. Binud Ku [[id number]] Collected:  11/19/19 0415 Order Status:  Canceled Specimen:  Stool Imaging:  
 
 
Signed By: Quintin Schulz NP November 27, 2019

## 2019-11-27 NOTE — PROGRESS NOTES
Problem: Falls - Risk of 
Goal: *Absence of Falls Description Document [place] [place] Fall Risk and appropriate interventions in the flowsheet. 11/27/2019 1023 by Vandana Disla Outcome: Progressing Towards Goal 
Note: Fall Risk Interventions: 
Mobility Interventions: Communicate number of staff needed for ambulation/transfer, Patient to call before getting OOB Medication Interventions: Patient to call before getting OOB Elimination Interventions: Call light in reach, Patient to call for help with toileting needs History of Falls Interventions: Door open when patient unattended, Room close to nurse's station 11/27/2019 1021 by Vandana Disla Outcome: Progressing Towards Goal 
Note: Fall Risk Interventions: 
Mobility Interventions: Communicate number of staff needed for ambulation/transfer, Patient to call before getting OOB Medication Interventions: Patient to call before getting OOB Elimination Interventions: Call light in reach, Patient to call for help with toileting needs History of Falls Interventions: Door open when patient unattended, Room close to nurse's station Problem: Patient Education: Go to Patient Education Activity Goal: Patient/Family Education 11/27/2019 1023 by Vandana Disla Outcome: Progressing Towards Goal 
11/27/2019 1021 by Vandana Disla Outcome: Progressing Towards Goal 
  
Problem: Anemia Care Plan (Adult and Pediatric) Goal: *Labs within defined limits 11/27/2019 1023 by Vandana Disla Outcome: Progressing Towards Goal 
11/27/2019 1021 by Vandana Disla Outcome: Progressing Towards Goal 
Goal: *Tolerates increased activity 11/27/2019 1023 by Vandana Disla Outcome: Progressing Towards Goal 
11/27/2019 1021 by Vandana Disla Outcome: Progressing Towards Goal 
  
Problem: Patient Education: Go to Patient Education Activity Goal: Patient/Family Education 11/27/2019 1023 by Nayely Arceo Outcome: Progressing Towards Goal 
11/27/2019 1021 by Nayely Arceo Outcome: Progressing Towards Goal 
  
Problem: Diarrhea (Adult and Pediatrics) Goal: *Absence of diarrhea 
11/27/2019 1023 by Nayely Arceo Outcome: Progressing Towards Goal 
11/27/2019 1021 by Nayely Arceo Outcome: Progressing Towards Goal 
  
Problem: Nausea/Vomiting (Adult) Goal: *Absence of nausea/vomiting 11/27/2019 1023 by Nayely Arceo Outcome: Progressing Towards Goal 
11/27/2019 1021 by Nayely Arceo Outcome: Progressing Towards Goal 
  
Problem: Patient Education: Go to Patient Education Activity Goal: Patient/Family Education 11/27/2019 1023 by Nayely Arceo Outcome: Progressing Towards Goal 
11/27/2019 1021 by Nayely Arceo Outcome: Progressing Towards Goal 
  
Problem: Nutrition Deficit Goal: *Optimize nutritional status 11/27/2019 1023 by Nayely Arceo Outcome: Progressing Towards Goal 
11/27/2019 1021 by Nayely Arceo Outcome: Progressing Towards Goal 
  
Problem: Patient Education: Go to Patient Education Activity Goal: Patient/Family Education 11/27/2019 1023 by Nayely Arceo Outcome: Progressing Towards Goal 
11/27/2019 1021 by Nayely Arceo Outcome: Progressing Towards Goal

## 2019-11-27 NOTE — PROGRESS NOTES
Problem: Falls - Risk of 
Goal: *Absence of Falls Description Document Anju Schmidncer Fall Risk and appropriate interventions in the flowsheet. Outcome: Progressing Towards Goal 
Note: Fall Risk Interventions: 
Mobility Interventions: Communicate number of staff needed for ambulation/transfer, Patient to call before getting OOB Medication Interventions: Patient to call before getting OOB Elimination Interventions: Call light in reach, Patient to call for help with toileting needs History of Falls Interventions: Door open when patient unattended, Room close to nurse's station Problem: Patient Education: Go to Patient Education Activity Goal: Patient/Family Education Outcome: Progressing Towards Goal 
  
Problem: Anemia Care Plan (Adult and Pediatric) Goal: *Labs within defined limits Outcome: Progressing Towards Goal 
Goal: *Tolerates increased activity Outcome: Progressing Towards Goal 
  
Problem: Patient Education: Go to Patient Education Activity Goal: Patient/Family Education Outcome: Progressing Towards Goal 
  
Problem: Diarrhea (Adult and Pediatrics) Goal: *Absence of diarrhea Outcome: Progressing Towards Goal 
  
Problem: Nausea/Vomiting (Adult) Goal: *Absence of nausea/vomiting Outcome: Progressing Towards Goal 
  
Problem: Patient Education: Go to Patient Education Activity Goal: Patient/Family Education Outcome: Progressing Towards Goal 
  
Problem: Nutrition Deficit Goal: *Optimize nutritional status Outcome: Progressing Towards Goal 
  
Problem: Patient Education: Go to Patient Education Activity Goal: Patient/Family Education Outcome: Progressing Towards Goal

## 2019-11-27 NOTE — PROGRESS NOTES
Spoke with NP Christine QUESADA regarding pts lack of sleep. Orders received and read back for Restoril 15mg, PO, qhs, for sleep

## 2019-11-28 LAB
ALBUMIN SERPL-MCNC: 1.8 G/DL (ref 3.2–4.6)
ALBUMIN/GLOB SERPL: 0.5 {RATIO} (ref 1.2–3.5)
ALP SERPL-CCNC: 74 U/L (ref 50–136)
ALT SERPL-CCNC: 32 U/L (ref 12–65)
ANION GAP SERPL CALC-SCNC: 5 MMOL/L (ref 7–16)
AST SERPL-CCNC: 20 U/L (ref 15–37)
BASOPHILS # BLD: 0 K/UL (ref 0–0.2)
BASOPHILS NFR BLD: 0 % (ref 0–2)
BILIRUB SERPL-MCNC: 0.4 MG/DL (ref 0.2–1.1)
BUN SERPL-MCNC: 18 MG/DL (ref 8–23)
CALCIUM SERPL-MCNC: 8.1 MG/DL (ref 8.3–10.4)
CHLORIDE SERPL-SCNC: 108 MMOL/L (ref 98–107)
CO2 SERPL-SCNC: 31 MMOL/L (ref 21–32)
CREAT SERPL-MCNC: 0.58 MG/DL (ref 0.6–1)
DIFFERENTIAL METHOD BLD: ABNORMAL
EOSINOPHIL # BLD: 0 K/UL (ref 0–0.8)
EOSINOPHIL NFR BLD: 0 % (ref 0.5–7.8)
ERYTHROCYTE [DISTWIDTH] IN BLOOD BY AUTOMATED COUNT: 13.2 % (ref 11.9–14.6)
GLOBULIN SER CALC-MCNC: 3.8 G/DL (ref 2.3–3.5)
GLUCOSE SERPL-MCNC: 102 MG/DL (ref 65–100)
HCT VFR BLD AUTO: 21.9 % (ref 35.8–46.3)
HGB BLD-MCNC: 7.1 G/DL (ref 11.7–15.4)
IMM GRANULOCYTES # BLD AUTO: 0 K/UL (ref 0–0.5)
IMM GRANULOCYTES NFR BLD AUTO: 0 % (ref 0–5)
LDH SERPL L TO P-CCNC: 197 U/L (ref 110–210)
LYMPHOCYTES # BLD: 0 K/UL (ref 0.5–4.6)
LYMPHOCYTES NFR BLD: 50 % (ref 13–44)
MAGNESIUM SERPL-MCNC: 2 MG/DL (ref 1.8–2.4)
MCH RBC QN AUTO: 29 PG (ref 26.1–32.9)
MCHC RBC AUTO-ENTMCNC: 32.4 G/DL (ref 31.4–35)
MCV RBC AUTO: 89.4 FL (ref 79.6–97.8)
MONOCYTES # BLD: 0 K/UL (ref 0.1–1.3)
MONOCYTES NFR BLD: 0 % (ref 4–12)
NEUTS SEG # BLD: 0 K/UL (ref 1.7–8.2)
NEUTS SEG NFR BLD: 50 % (ref 43–78)
NRBC # BLD: 0 K/UL (ref 0–0.2)
PHOSPHATE SERPL-MCNC: 3.8 MG/DL (ref 2.3–3.7)
PLATELET # BLD AUTO: 7 K/UL (ref 150–450)
PMV BLD AUTO: 11.9 FL (ref 9.4–12.3)
POTASSIUM SERPL-SCNC: 4 MMOL/L (ref 3.5–5.1)
PROT SERPL-MCNC: 5.6 G/DL (ref 6.3–8.2)
RBC # BLD AUTO: 2.45 M/UL (ref 4.05–5.2)
SODIUM SERPL-SCNC: 144 MMOL/L (ref 136–145)
URATE SERPL-MCNC: 1 MG/DL (ref 2.6–6)
WBC # BLD AUTO: 0 K/UL (ref 4.3–11.1)

## 2019-11-28 PROCEDURE — 74011000258 HC RX REV CODE- 258: Performed by: INTERNAL MEDICINE

## 2019-11-28 PROCEDURE — 74011250636 HC RX REV CODE- 250/636: Performed by: INTERNAL MEDICINE

## 2019-11-28 PROCEDURE — 99232 SBSQ HOSP IP/OBS MODERATE 35: CPT | Performed by: INTERNAL MEDICINE

## 2019-11-28 PROCEDURE — 77030003560 HC NDL HUBR BARD -A

## 2019-11-28 PROCEDURE — 84550 ASSAY OF BLOOD/URIC ACID: CPT

## 2019-11-28 PROCEDURE — 36430 TRANSFUSION BLD/BLD COMPNT: CPT | Performed by: NURSE PRACTITIONER

## 2019-11-28 PROCEDURE — 74011250637 HC RX REV CODE- 250/637: Performed by: INTERNAL MEDICINE

## 2019-11-28 PROCEDURE — 74011250637 HC RX REV CODE- 250/637: Performed by: NURSE PRACTITIONER

## 2019-11-28 PROCEDURE — 65270000015 HC RM PRIVATE ONCOLOGY

## 2019-11-28 PROCEDURE — 83735 ASSAY OF MAGNESIUM: CPT

## 2019-11-28 PROCEDURE — 36591 DRAW BLOOD OFF VENOUS DEVICE: CPT

## 2019-11-28 PROCEDURE — 85025 COMPLETE CBC W/AUTO DIFF WBC: CPT

## 2019-11-28 PROCEDURE — 80053 COMPREHEN METABOLIC PANEL: CPT

## 2019-11-28 PROCEDURE — 83615 LACTATE (LD) (LDH) ENZYME: CPT

## 2019-11-28 PROCEDURE — 84100 ASSAY OF PHOSPHORUS: CPT

## 2019-11-28 PROCEDURE — 65270000029 HC RM PRIVATE

## 2019-11-28 PROCEDURE — P9037 PLATE PHERES LEUKOREDU IRRAD: HCPCS

## 2019-11-28 PROCEDURE — 86644 CMV ANTIBODY: CPT

## 2019-11-28 RX ORDER — DRONABINOL 2.5 MG/1
2.5 CAPSULE ORAL DAILY
Status: DISCONTINUED | OUTPATIENT
Start: 2019-11-28 | End: 2019-12-04

## 2019-11-28 RX ORDER — SODIUM CHLORIDE 9 MG/ML
250 INJECTION, SOLUTION INTRAVENOUS AS NEEDED
Status: DISCONTINUED | OUTPATIENT
Start: 2019-11-28 | End: 2019-11-29

## 2019-11-28 RX ADMIN — ACYCLOVIR 400 MG: 800 TABLET ORAL at 08:30

## 2019-11-28 RX ADMIN — PIPERACILLIN AND TAZOBACTAM 3.38 G: 3; .375 INJECTION, POWDER, FOR SOLUTION INTRAVENOUS at 14:00

## 2019-11-28 RX ADMIN — LORAZEPAM 1 MG: 1 TABLET ORAL at 21:08

## 2019-11-28 RX ADMIN — PIPERACILLIN AND TAZOBACTAM 3.38 G: 3; .375 INJECTION, POWDER, FOR SOLUTION INTRAVENOUS at 21:08

## 2019-11-28 RX ADMIN — LOPERAMIDE HYDROCHLORIDE 2 MG: 2 CAPSULE ORAL at 14:54

## 2019-11-28 RX ADMIN — POTASSIUM CHLORIDE 20 MEQ: 20 TABLET, EXTENDED RELEASE ORAL at 18:00

## 2019-11-28 RX ADMIN — TBO-FILGRASTIM 480 MCG: 480 INJECTION, SOLUTION SUBCUTANEOUS at 21:08

## 2019-11-28 RX ADMIN — VORICONAZOLE 200 MG: 200 TABLET, FILM COATED ORAL at 21:08

## 2019-11-28 RX ADMIN — ACETAMINOPHEN 650 MG: 325 TABLET, FILM COATED ORAL at 09:10

## 2019-11-28 RX ADMIN — LOPERAMIDE HYDROCHLORIDE 2 MG: 2 CAPSULE ORAL at 21:08

## 2019-11-28 RX ADMIN — LOPERAMIDE HYDROCHLORIDE 2 MG: 2 CAPSULE ORAL at 06:37

## 2019-11-28 RX ADMIN — DRONABINOL 2.5 MG: 2.5 CAPSULE ORAL at 14:54

## 2019-11-28 RX ADMIN — CAMPHOR AND MENTHOL: 5; 5 LOTION TOPICAL at 08:30

## 2019-11-28 RX ADMIN — PIPERACILLIN AND TAZOBACTAM 3.38 G: 3; .375 INJECTION, POWDER, FOR SOLUTION INTRAVENOUS at 01:45

## 2019-11-28 RX ADMIN — Medication 2 TABLET: at 08:29

## 2019-11-28 RX ADMIN — Medication 10 ML: at 21:09

## 2019-11-28 RX ADMIN — CAMPHOR AND MENTHOL: 5; 5 LOTION TOPICAL at 16:00

## 2019-11-28 RX ADMIN — PRAVASTATIN SODIUM 10 MG: 20 TABLET ORAL at 21:08

## 2019-11-28 RX ADMIN — DULOXETINE 30 MG: 30 CAPSULE, DELAYED RELEASE ORAL at 08:30

## 2019-11-28 RX ADMIN — ACYCLOVIR 400 MG: 800 TABLET ORAL at 18:00

## 2019-11-28 RX ADMIN — LORATADINE 10 MG: 10 TABLET ORAL at 08:30

## 2019-11-28 RX ADMIN — ALLOPURINOL 300 MG: 300 TABLET ORAL at 08:30

## 2019-11-28 RX ADMIN — DIPHENHYDRAMINE HYDROCHLORIDE 25 MG: 25 CAPSULE ORAL at 09:10

## 2019-11-28 RX ADMIN — TEMAZEPAM 15 MG: 15 CAPSULE ORAL at 21:08

## 2019-11-28 RX ADMIN — CAMPHOR AND MENTHOL: 5; 5 LOTION TOPICAL at 21:09

## 2019-11-28 RX ADMIN — VORICONAZOLE 200 MG: 200 TABLET, FILM COATED ORAL at 08:30

## 2019-11-28 RX ADMIN — POTASSIUM CHLORIDE 20 MEQ: 20 TABLET, EXTENDED RELEASE ORAL at 08:29

## 2019-11-28 NOTE — PROGRESS NOTES
0643-Bedside report received from Olivier Curtis RN. Resting in bed. No needs voiced. No s/s of acute distress. 1319-Bedside shift change report given to Chuck Dumont RN (oncoming nurse) by Amari Wynne RN (offgoing nurse). Report included the following information SBAR, Kardex, Intake/Output, MAR and Recent Results.

## 2019-11-28 NOTE — PROGRESS NOTES
Problem: Falls - Risk of 
Goal: *Absence of Falls Description Document Jasvir Nikunj Fall Risk and appropriate interventions in the flowsheet. Outcome: Progressing Towards Goal 
Note: Fall Risk Interventions: 
Mobility Interventions: Communicate number of staff needed for ambulation/transfer Medication Interventions: Patient to call before getting OOB Elimination Interventions: Call light in reach History of Falls Interventions: Door open when patient unattended, Room close to nurse's station Problem: Anemia Care Plan (Adult and Pediatric) Goal: *Labs within defined limits Outcome: Progressing Towards Goal 
Goal: *Tolerates increased activity Outcome: Progressing Towards Goal 
  
Problem: Diarrhea (Adult and Pediatrics) Goal: *Absence of diarrhea Outcome: Progressing Towards Goal 
  
Problem: Nausea/Vomiting (Adult) Goal: *Absence of nausea/vomiting Outcome: Progressing Towards Goal 
  
Problem: Nutrition Deficit Goal: *Optimize nutritional status Outcome: Progressing Towards Goal

## 2019-11-28 NOTE — PROGRESS NOTES
[employer] [facility] Inpatient Hematology / Oncology Daily Progress Note Reason for Admission:  Admission for antineoplastic chemotherapy [Z51.11] 24 Hour Events: 
BCx- + enterococcus/streptococcus Repeat BC NTD Day 21 FLAG-VENITA Awaiting count recovery, on Granix Echo neg for veg. ROS: 
Constitutional: +fatigue -fever CV: Negative for chest pain, palpitations, edema. Respiratory: Negative for dyspnea, cough, wheezing. GI: Negative for nausea, abdominal pain. Diarrhea. 10 point review of systems is otherwise negative with the exception of the elements mentioned above in the HPI. No Known Allergies Past Medical History:  
Diagnosis Date  AML (acute myeloid leukemia) ([facility] [address] dx- 4/2019  
 followed by dr Madhav Morris  Depression  Hypercholesterolemia  Infection   
 of port -- was placed 5/2019-- removed 6/2019---right chest  
 Psychiatric disorder   
 aniexty  Sleep apnea Past Surgical History:  
Procedure Laterality Date  HX OTHER SURGICAL    
 colonoscopy  HX VASCULAR ACCESS    
 IR INSERT TUNL CVC W PORT OVER 5 YEARS  4/30/2019  IR INSERT TUNL CVC W PORT OVER 5 YEARS  7/15/2019  IR REMOVE TUNL CVAD W PORT/PUMP  6/13/2019 Family History Problem Relation Age of Onset  Cancer Father Social History Socioeconomic History  Marital status:  Spouse name: Not on file  Number of children: Not on file  Years of education: Not on file  Highest education level: Not on file Occupational History  Not on file Social Needs  Financial resource strain: Not on file  Food insecurity:  
  Worry: Not on file Inability: Not on file  Transportation needs:  
  Medical: Not on file Non-medical: Not on file Tobacco Use  Smoking status: Never Smoker  Smokeless tobacco: Never Used Substance and Sexual Activity  Alcohol use: Never Frequency: Never  Drug use: Never  Sexual activity: Not on file Lifestyle  Physical activity:  
  Days per week: Not on file Minutes per session: Not on file  Stress: Not on file Relationships  Social connections:  
  Talks on phone: Not on file Gets together: Not on file Attends Advent service: Not on file Active member of club or organization: Not on file Attends meetings of clubs or organizations: Not on file Relationship status: Not on file  Intimate partner violence:  
  Fear of current or ex partner: Not on file Emotionally abused: Not on file Physically abused: Not on file Forced sexual activity: Not on file Other Topics Concern [address] Service Not Asked  Blood Transfusions Not Asked  Caffeine Concern Not Asked  Occupational Exposure Not Asked Denis [place] Hazards Not Asked  Sleep Concern Not Asked  Stress Concern Not Asked  Weight Concern Not Asked  Special Diet Not Asked  Back Care Not Asked  Exercise Not Asked  Bike Helmet Not Asked  Seat Belt Not Asked  Self-Exams Not Asked Social History Narrative  Not on file Current Facility-Administered Medications Medication Dose Route Frequency Provider Last Rate Last Dose  
 0.9% sodium chloride infusion 250 mL  250 mL IntraVENous PRN Blayne Martin MD      
 temazepam (RESTORIL) capsule 15 mg  15 mg Oral QHS Christine Jay NP   15 mg at 11/27/19 2145  
 0.9% sodium chloride infusion 250 mL  250 mL IntraVENous PRN Blayne Martin MD      
 camphor-menthol ANNAMARIE [facility]) 0.5-0.5 % lotion   Topical TID Paolo Gordon NP      
 piperacillin-tazobactam (ZOSYN) 3.375 g in 0.9% sodium chloride (MBP/ADV) 100 mL  3.375 g IntraVENous Q8H Teodoro Cantu MD 25 mL/hr at 11/28/19 0145 3.375 g at 11/28/19 0145  
 0.9% sodium chloride infusion 250 mL  250 mL IntraVENous PRN Blayne Martin MD      
 sodium chloride 0.9 % bolus infusion 500 mL  500 mL IntraVENous QHS Julia Urbina NP   500 mL at 11/21/19 2315  baclofen (LIORESAL) tablet 5 mg  5 mg Oral Q8H PRN Miky Torres MD   5 mg at 11/21/19 1335  potassium chloride (K-DUR, KLOR-CON) SR tablet 20 mEq  20 mEq Oral BID Miky Torres MD   20 mEq at 11/28/19 0829  
 albuterol (PROVENTIL VENTOLIN) nebulizer solution 2.5 mg  2.5 mg Nebulization Q6H PRN Gilbert Diego NP   2.5 mg at 11/21/19 1600  
 alum-mag hydroxide-simeth (MYLANTA) oral suspension 30 mL  30 mL Oral Q4H PRN Miky Torres MD   30 mL at 11/21/19 8513  loperamide (IMODIUM) capsule 2 mg  2 mg Oral Q4H PRN Miky Torres MD   2 mg at 11/28/19 5936  loratadine (CLARITIN) tablet 10 mg  10 mg Oral DAILY Annabel Dee NP   10 mg at 11/28/19 0830  
 central line flush (saline) syringe 10 mL  10 mL InterCATHeter PRN Miky Torres MD   10 mL at 11/18/19 2110  
 docusate sodium (COLACE) capsule 100 mg  100 mg Oral BID PRN Cristiana Regalado NP   100 mg at 11/11/19 1308  LORazepam (ATIVAN) injection 0.5 mg  0.5 mg IntraVENous Q6H PRN Miky Torres MD   0.5 mg at 11/27/19 1201  
 saline peripheral flush soln 10 mL  10 mL InterCATHeter PRN Miky Torres MD   10 mL at 11/23/19 2229  
 heparin (porcine) pf 300-500 Units  300-500 Units InterCATHeter PRN Miky Torres MD      
 tbo-filgrastim [facility]) injection 480 mcg  480 mcg SubCUTAneous QHS Miky Torres MD   480 mcg at 11/27/19 2146  acyclovir (ZOVIRAX) tablet 400 mg  400 mg Oral BID Cristiana Regalado NP   400 mg at 11/28/19 0830  
 allopurinol (ZYLOPRIM) tablet 300 mg  300 mg Oral DAILY Cristiana Regalado NP   300 mg at 11/28/19 0830  cholecalciferol (VITAMIN D3) (400 Units /10 mcg) tablet 2 Tab  800 Units Oral DAILY Cristiana Regalado NP   2 Tab at 11/28/19 3039  DULoxetine (CYMBALTA) capsule 30 mg  30 mg Oral DAILY Cristiana Regalado NP   30 mg at 11/28/19 0830  lidocaine-prilocaine (EMLA) 2.5-2.5 % cream   Topical PRN Cristiana Regalado NP      
  LORazepam (ATIVAN) tablet 1 mg  1 mg Oral QHS Abbey Bacca, NP   1 mg at 19 214  pravastatin (PRAVACHOL) tablet 10 mg  10 mg Oral QHS Abbey Bacca, NP   10 mg at 19 214  voriconazole (VFEND) tablet 200 mg  200 mg Oral Q12H Abbey Bacca, NP   200 mg at 19 0830  
 ondansetron (ZOFRAN) injection 4 mg  4 mg IntraVENous Q4H PRN Abbey Bacca, NP   4 mg at 19 1153  prochlorperazine (COMPAZINE) with saline injection 5 mg  5 mg IntraVENous Q6H PRN Abbey Bacca, NP   5 mg at 19 1632  
 HYDROcodone-acetaminophen (NORCO) 5-325 mg per tablet 1 Tab  1 Tab Oral Q6H PRN Abbey Bacca, NP   1 Tab at 19 4097  morphine injection 2 mg  2 mg IntraVENous Q4H PRN Abbey Bacca, NP      
 acetaminophen (TYLENOL) tablet 650 mg  650 mg Oral Q6H PRN Leandra Chandra MD   650 mg at 19 1144  diphenhydrAMINE (BENADRYL) capsule 25 mg  25 mg Oral Q6H PRN Leandra Chandra MD   25 mg at 19 2140  acetaminophen/diphenhydrAMINE (TYLENOL PM EXT STR) 500/25 mg (Patient Supplied)   Oral QHS Leandra Chandra MD      
 vit C,V-Cz-szkdx-lutein-zeaxan (PRESERVISION AREDS-2) capsule 1 Cap (Patient Supplied)  [address] Leandra Cahndra MD   1 Cap at 19 0830 OBJECTIVE: 
Patient Vitals for the past 8 hrs: 
 BP Temp Pulse Resp SpO2  
19 0737 132/65 97.6 °F (36.4 °C) (!) 109 18 93 % 19 0315 134/61 99.5 °F (37.5 °C) 89 18 95 % Temp (24hrs), Av °F (37.2 °C), Min:97.6 °F (36.4 °C), Max:99.6 °F (37.6 °C) No intake/output data recorded. Physical Exam: 
Constitutional: Well developed, frail appearing female in no acute distress, sitting in bed HEENT: Normocephalic and atraumatic. Oropharynx is clear, mucous membranes are moist. Extraocular muscles are intact. Sclerae anicteric. Skin Warm and dry. Scattered rash. No erythema. No pallor. Bruising noted on BUE/R elbow and abdomen. Respiratory Crackles at bases,  normal air exchange without accessory muscle use. CVS Normal rate, regular rhythm and normal S1 and S2. No murmurs, gallops, or rubs. Abdomen Soft, mildly tender across lower abd-improving, nondistended, normoactive bowel sounds. Neuro Grossly nonfocal with no obvious sensory or motor deficits. MSK Normal range of motion in general.  No edema and no tenderness. Psych Appropriate mood and affect. Labs:   
Recent Results (from the past 24 hour(s)) METABOLIC PANEL, COMPREHENSIVE Collection Time: 11/28/19  3:14 AM  
Result Value Ref Range Sodium 144 136 - 145 mmol/L Potassium 4.0 3.5 - 5.1 mmol/L Chloride 108 (H) 98 - 107 mmol/L  
 CO2 31 21 - 32 mmol/L Anion gap 5 (L) 7 - 16 mmol/L Glucose 102 (H) 65 - 100 mg/dL BUN 18 8 - 23 MG/DL Creatinine 0.58 (L) 0.6 - 1.0 MG/DL  
 GFR est AA >60 >60 ml/min/1.73m2 GFR est non-AA >60 >60 ml/min/1.73m2 Calcium 8.1 (L) 8.3 - 10.4 MG/DL Bilirubin, total 0.4 0.2 - 1.1 MG/DL  
 ALT (SGPT) 32 12 - 65 U/L  
 AST (SGOT) 20 15 - 37 U/L Alk. phosphatase 74 50 - 136 U/L Protein, total 5.6 (L) 6.3 - 8.2 g/dL Albumin 1.8 (L) 3.2 - 4.6 g/dL Globulin 3.8 (H) 2.3 - 3.5 g/dL A-G Ratio 0.5 (L) 1.2 - 3.5 MAGNESIUM Collection Time: 11/28/19  3:14 AM  
Result Value Ref Range Magnesium 2.0 1.8 - 2.4 mg/dL LD Collection Time: 11/28/19  3:14 AM  
Result Value Ref Range  110 - 210 U/L  
URIC ACID Collection Time: 11/28/19  3:14 AM  
Result Value Ref Range Uric acid 1.0 (L) 2.6 - 6.0 MG/DL  
PHOSPHORUS Collection Time: 11/28/19  3:14 AM  
Result Value Ref Range Phosphorus 3.8 (H) 2.3 - 3.7 MG/DL  
CBC WITH AUTOMATED DIFF Collection Time: 11/28/19  3:14 AM  
Result Value Ref Range WBC 0.0 (LL) 4.3 - 11.1 K/uL  
 RBC 2.45 (L) 4.05 - 5.2 M/uL HGB 7.1 (L) 11.7 - 15.4 g/dL HCT 21.9 (L) 35.8 - 46.3 % MCV 89.4 79.6 - 97.8 FL  
 MCH 29.0 26.1 - 32.9 PG  
 MCHC 32.4 31.4 - 35.0 g/dL  
 RDW 13.2 11.9 - 14.6 % PLATELET 7 (LL) 122 - 450 K/uL MPV 11.9 9.4 - 12.3 FL ABSOLUTE NRBC 0.00 0.0 - 0.2 K/uL  
 DF AUTOMATED NEUTROPHILS 50 43 - 78 % LYMPHOCYTES 50 (H) 13 - 44 % MONOCYTES 0 (L) 4.0 - 12.0 % EOSINOPHILS 0 (L) 0.5 - 7.8 % BASOPHILS 0 0.0 - 2.0 % IMMATURE GRANULOCYTES 0 0.0 - 5.0 %  
 ABS. NEUTROPHILS 0.0 (L) 1.7 - 8.2 K/UL  
 ABS. LYMPHOCYTES 0.0 (L) 0.5 - 4.6 K/UL  
 ABS. MONOCYTES 0.0 (L) 0.1 - 1.3 K/UL  
 ABS. EOSINOPHILS 0.0 0.0 - 0.8 K/UL  
 ABS. BASOPHILS 0.0 0.0 - 0.2 K/UL  
 ABS. IMM. GRANS. 0.0 0.0 - 0.5 K/UL PLATELETS, ALLOCATE Collection Time: 11/28/19  5:30 AM  
Result Value Ref Range Unit number [id number] Blood component type PLTPH,LRIR,1 Unit division 00 Status of unit ALLOCATED Imaging: 
CT HEAD WO CONT [[id number]] Collected: 11/19/19 1050 Order Status: Completed Updated: 11/19/19 1054 Narrative:    
CT HEAD WITHOUT CONTRAST, 11/19/2019 History: Fall last night hitting left side of head Comparison: . Technique:   5 mm axial scans from the skull base to the vertex. All CT scans 
performed at this facility use one or all of the following: Automated exposure 
control, adjustment of the mA and/or kVp according to patient's size, iterative 
reconstruction. Findings:  No evidence of intracranial hemorrhage is seen. Faint bilateral basal 
ganglia calcifications are seen. No abnormal extra-axial fluid collections are 
seen.  Mild cortical involutional changes are seen which are not felt to be 
abnormal given the patient's age. Rebeca Nolan evidence for acute hydrocephalus is seen. No evidence of midline shift or herniation is seen.  No abnormal edema pattern 
is seen in a vascular distribution to suggest large artery infarction. Evaluation with bone windows shows no acute osseous changes.  The visualized 
sinuses, middle ears, and mastoid air cells are well aerated.   
Impression:    
IMPRESSION:   
1.  No acute intracranial process evident by noncontrast CT study of the head.  
 
  
XR CHEST SNGL V [[id number]] Collected: 11/18/19 2041 Order Status: Completed Updated: 11/18/19 2044 Narrative:    
EXAM: XR CHEST SNGL V 
 
INDICATION: neutropenic fever COMPARISON: 9/29/2019 FINDINGS: A portable AP radiograph of the chest was obtained at 1946 hours. The 
patient is on a cardiac monitor. The lungs are clear. The cardiac and 
mediastinal contours and pulmonary vascularity are normal.  The bones and soft 
tissues are grossly within normal limits. Impression:    
IMPRESSION: Normal chest.  
XR ELBOW RT MIN 3 V [[id number]] Collected: 11/10/19 1421 Order Status: Completed Updated: 11/10/19 1424 Narrative:    
Right elbow Clinical location: Elbow pain after feeling a pop, decreased range of motion FINDINGS: Four views of the right elbow show no fracture or dislocation. There 
is no joint effusion. The soft tissues are unremarkable. Impression:    
IMPRESSION: No acute osseous abnormality or joint derangement of the right 
elbow. ASSESSMENT: 
Problem List  Date Reviewed: 10/31/2019 Codes Class Noted Febrile neutropenia (HCC) ICD-10-CM: D70.9, R50.81 ICD-9-CM: 288.00, 780.61  9/21/2019 Pancytopenia due to antineoplastic chemotherapy [facility]) ICD-10-CM: D61.810, T45.1X5A 
ICD-9-CM: 284.11, E933.1  6/12/2019 Cellulitis of neck ICD-10-CM: L30.948 ICD-9-CM: 682.1  6/12/2019 Immunocompromised status associated with infection ICD-10-CM: B99.9 ICD-9-CM: 136.9  6/12/2019 Port or reservoir infection ICD-10-CM: Q36.222W ICD-9-CM: 999.33  6/12/2019 Acute myeloid [facility] not having achieved [facility] ICD-10-CM: C92.00 ICD-9-CM: 205.00  5/9/2019 Admission for antineoplastic chemotherapy ICD-10-CM: Z51.11 ICD-9-CM: V58.11  5/5/2019 AML (acute myeloblastic leukemia) ([facility] ICD-10-CM: C92.00 ICD-9-CM: 205.00  4/28/2019 Weakness generalized ICD-10-CM: R53.1 ICD-9-CM: 780.79  4/28/2019 Pancytopenia ([facility] ICD-10-CM: A86.501 ICD-9-CM: 284.19  4/28/2019 Thrombocytopenia ([facility] ICD-10-CM: D69.6 ICD-9-CM: 287.5  4/27/2019 Ms. Clarice Orantes is a 68 y.o. female admitted on 11/7/2019. She is a known patient of Dr. 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 She failed induction with 7+3/Midostaurin. On Dacogen/Gilteritinib with recent BMbx with persistent residual disease. She is being admitted for FLAG-VENITA. She is feeling well and is ready to proceed with treatment. PLAN: 
AML 
- admit for salvage FLAG-VENITA 
- needs Echo prior - ordered 11/8 Day 1 FLAG-VENITA. Echo with EF 55-60%, proceed with tx. 
11/9 Day 2 FLAG-VENITA. Tolerating well, no issues. Uric acid 3.1 today. 11/10 Day 3 FLAG-VENITA. No issues with treatment. Uric acid 3.3 today. 11/12 Day 5 FLAG-VENITA. Tolerating treatment well. G-CSF to start tomorrow. 11/13 Day 6 FLAG-VENITA, doing well, Granix/claritin starts today 11/19 Day 12 FLAG-VENITA. Awaiting count recovery, con't Granix. 11/20 Day 13 FLAG-VENITA. WBC 0. Continue Granix. 11/27 Day 20 FLAG VENITA. WBC 0.0 Pancytopenia secondary to disease - Transfuse prn per [facility] SOPs 
11/19 Transfuse PRBCs today R elbow pain 11/11 c/o R elbow pain with limited ROM yesterday. Xray neg. Pain improved today, noted bruising. Normal ROM on exam. 
 
Mild Abd discomfort/loose stools 11/13 discomfort is improved from yesterday, will monitor 11/14 loose stools, but not watery, can use Imodium prn 
11/15 Imodium working well  
11/16 controlled 11/26 Ova and parasite 11/22 pending. Check for C diff if stool appropriate. Continue anti diarrheal for now Neutropenic fever / UTI 
11/19 Tmax 102. 1.  UA +UTI. UCx pending. BCx-NGTD. CXR neg. On Cef/Vanc. DC prophylactic LVQ. 11/20 Tmax 102. BCx positive for streptococcus/enterococcus. Subculture in progress. On Cef/Vanc.   
11/22 repeat BC NTD. Alpha strep on vanc. 11/24 Repeat BC NTD. No fever. 11/25 new skin rash. ?RT to vanc. Consult ID for recommendations. 11/26 ID dc'd cefe and vanc. Started zosyn. TTE ordered. Sepsis not present on admission 11/21  BCx positive for streptococcus/enterococcus. Fall 
11/19 s/p last PM.  No LOC. Hit head. CT head neg. Double vision 11/21 Neuro has seen patient. Concerns for possible 6th nerve palsy. Recommends LP. We will hold with WBC 0.0 and continue to monitor. 11/22 vision improved today. MRI brain pending. 11/23 MRI unremarkable. Discussed with Neuro. No need for LP 
11/27 Resolved Continue home meds Prophylactic Antibx: Acyclovir, Voriconazole Alexander SOPs SCDs for DVT prophylaxis (AC contraindicated d/t thrombocytopenia) Goals and plan of care reviewed with the patient. All questions answered to the best of our ability. Disposition:  Awaiting count recovery. Will need BMbx prior to discharge but not prior to day 28 chemo. Upon discharge will need twice weekly labs w transfusions as needed. Weekly provider visits. RTC within 1 week from discharge. Encourage use of IS. I personally saw, exammed and counselled the patient, and discussed with NP, agree with above history/assessment/plan. 73 y. o.female AML with FLT3 mut s/p multiple lines of chemo and Gilteritinib but refractory, received salvage FLAG-VENITA, day 21, strep A and enterococcus sepsis controlled, ANC still 0, rash likely related to antibiotics, give antihistamine and rash fainted, ANC 0, changed to zosyn, increased diarrhea control, b/l lung basilar crackles, use incentive spirometry, give plt today, marinol for anorexia, supportive care per Carlos Enrique Garza M.D. [address] Office : [phone] Fax : [phone]

## 2019-11-28 NOTE — PROGRESS NOTES
END OF SHIFT NOTE: 
 
Intake/Output 11/27 1901 - 11/28 0700 In: 487 [P.O.:120; I.V.:367] Out: 1200 [Urine:1200] Voiding: YES Catheter: NO 
Drain:   
 
 
 
 
 
Stool:  1 occurrences. Stool Assessment Stool Color: Enedina Grief (11/28/19 6416) Stool Appearance: Loose (11/28/19 1586) Stool Amount: Small (11/28/19 0868) Stool Source/Status: Rectum (11/28/19 0698) Emesis:  0 occurrences. VITAL SIGNS Patient Vitals for the past 12 hrs: 
 Temp Pulse Resp BP SpO2  
11/28/19 0315 99.5 °F (37.5 °C) 89 18 134/61 95 % 11/27/19 2345 99.6 °F (37.6 °C) 85 18 148/64 94 % 11/27/19 1930 99.5 °F (37.5 °C) 91 18 141/53 93 % Pain Assessment Pain 1 Pain Scale 1: Numeric (0 - 10) (11/28/19 0215) Pain Intensity 1: 0 (11/28/19 0215) Patient Stated Pain Goal: 0 (11/28/19 0215) Pain Reassessment 1: Yes (11/24/19 1025) Pain Onset 1: suddenly (11/24/19 1000) Pain Location 1: Abdomen (11/24/19 1000) Pain Orientation 1: Left;Right; Anterior (11/24/19 1000) Pain Description 1: Cramping (11/24/19 1000) Pain Intervention(s) 1: Medication (see MAR) (11/24/19 1000) Ambulating Yes Additional Information: VSS. Afebrile. Pt seemed to have rested well. Will need one unit of platelets today. No needs voiced at this time Shift report given to oncoming nurse at the bedside.  
 
Anand Zaldivar RN

## 2019-11-28 NOTE — PROGRESS NOTES
Problem: Falls - Risk of 
Goal: *Absence of Falls Description Document Collins Refugio Fall Risk and appropriate interventions in the flowsheet. Outcome: Progressing Towards Goal 
Note: Fall Risk Interventions: 
Mobility Interventions: Communicate number of staff needed for ambulation/transfer Medication Interventions: Patient to call before getting OOB Elimination Interventions: Call light in reach History of Falls Interventions: Door open when patient unattended, Room close to nurse's station

## 2019-11-29 LAB
ALBUMIN SERPL-MCNC: 1.9 G/DL (ref 3.2–4.6)
ALBUMIN/GLOB SERPL: 0.5 {RATIO} (ref 1.2–3.5)
ALP SERPL-CCNC: 76 U/L (ref 50–136)
ALT SERPL-CCNC: 38 U/L (ref 12–65)
ANION GAP SERPL CALC-SCNC: 6 MMOL/L (ref 7–16)
AST SERPL-CCNC: 21 U/L (ref 15–37)
BASOPHILS # BLD: 0 K/UL (ref 0–0.2)
BASOPHILS NFR BLD: 0 % (ref 0–2)
BILIRUB SERPL-MCNC: 0.3 MG/DL (ref 0.2–1.1)
BLD PROD TYP BPU: NORMAL
BPU ID: NORMAL
BUN SERPL-MCNC: 15 MG/DL (ref 8–23)
CALCIUM SERPL-MCNC: 8.3 MG/DL (ref 8.3–10.4)
CHLORIDE SERPL-SCNC: 110 MMOL/L (ref 98–107)
CO2 SERPL-SCNC: 28 MMOL/L (ref 21–32)
CREAT SERPL-MCNC: 0.61 MG/DL (ref 0.6–1)
DIFFERENTIAL METHOD BLD: ABNORMAL
EOSINOPHIL # BLD: 0 K/UL (ref 0–0.8)
EOSINOPHIL NFR BLD: 0 % (ref 0.5–7.8)
ERYTHROCYTE [DISTWIDTH] IN BLOOD BY AUTOMATED COUNT: 13.2 % (ref 11.9–14.6)
GLOBULIN SER CALC-MCNC: 4.1 G/DL (ref 2.3–3.5)
GLUCOSE SERPL-MCNC: 90 MG/DL (ref 65–100)
HCT VFR BLD AUTO: 21.4 % (ref 35.8–46.3)
HGB BLD-MCNC: 7 G/DL (ref 11.7–15.4)
IMM GRANULOCYTES # BLD AUTO: 0 K/UL (ref 0–0.5)
IMM GRANULOCYTES NFR BLD AUTO: 0 % (ref 0–5)
LDH SERPL L TO P-CCNC: 205 U/L (ref 110–210)
LYMPHOCYTES # BLD: 0 K/UL (ref 0.5–4.6)
LYMPHOCYTES NFR BLD: 50 % (ref 13–44)
MAGNESIUM SERPL-MCNC: 2 MG/DL (ref 1.8–2.4)
MCH RBC QN AUTO: 29.7 PG (ref 26.1–32.9)
MCHC RBC AUTO-ENTMCNC: 32.7 G/DL (ref 31.4–35)
MCV RBC AUTO: 90.7 FL (ref 79.6–97.8)
MONOCYTES # BLD: 0 K/UL (ref 0.1–1.3)
MONOCYTES NFR BLD: 0 % (ref 4–12)
NEUTS SEG # BLD: 0 K/UL (ref 1.7–8.2)
NEUTS SEG NFR BLD: 50 % (ref 43–78)
NRBC # BLD: 0 K/UL (ref 0–0.2)
PHOSPHATE SERPL-MCNC: 3.5 MG/DL (ref 2.3–3.7)
PLATELET # BLD AUTO: 39 K/UL (ref 150–450)
PMV BLD AUTO: 11.2 FL (ref 9.4–12.3)
POTASSIUM SERPL-SCNC: 4.2 MMOL/L (ref 3.5–5.1)
PROT SERPL-MCNC: 6 G/DL (ref 6.3–8.2)
RBC # BLD AUTO: 2.36 M/UL (ref 4.05–5.2)
SODIUM SERPL-SCNC: 144 MMOL/L (ref 136–145)
STATUS OF UNIT,%ST: NORMAL
UNIT DIVISION, %UDIV: 0
URATE SERPL-MCNC: 0.9 MG/DL (ref 2.6–6)
WBC # BLD AUTO: 0 K/UL (ref 4.3–11.1)

## 2019-11-29 PROCEDURE — 74011000258 HC RX REV CODE- 258: Performed by: INTERNAL MEDICINE

## 2019-11-29 PROCEDURE — 83615 LACTATE (LD) (LDH) ENZYME: CPT

## 2019-11-29 PROCEDURE — 99232 SBSQ HOSP IP/OBS MODERATE 35: CPT | Performed by: INTERNAL MEDICINE

## 2019-11-29 PROCEDURE — 85025 COMPLETE CBC W/AUTO DIFF WBC: CPT

## 2019-11-29 PROCEDURE — 74011250637 HC RX REV CODE- 250/637: Performed by: NURSE PRACTITIONER

## 2019-11-29 PROCEDURE — 80053 COMPREHEN METABOLIC PANEL: CPT

## 2019-11-29 PROCEDURE — 74011250636 HC RX REV CODE- 250/636: Performed by: INTERNAL MEDICINE

## 2019-11-29 PROCEDURE — 84550 ASSAY OF BLOOD/URIC ACID: CPT

## 2019-11-29 PROCEDURE — 65270000029 HC RM PRIVATE

## 2019-11-29 PROCEDURE — 86900 BLOOD TYPING SEROLOGIC ABO: CPT

## 2019-11-29 PROCEDURE — P9040 RBC LEUKOREDUCED IRRADIATED: HCPCS

## 2019-11-29 PROCEDURE — 65270000015 HC RM PRIVATE ONCOLOGY

## 2019-11-29 PROCEDURE — 74011250636 HC RX REV CODE- 250/636: Performed by: NURSE PRACTITIONER

## 2019-11-29 PROCEDURE — 86923 COMPATIBILITY TEST ELECTRIC: CPT

## 2019-11-29 PROCEDURE — 74011250637 HC RX REV CODE- 250/637: Performed by: INTERNAL MEDICINE

## 2019-11-29 PROCEDURE — 84100 ASSAY OF PHOSPHORUS: CPT

## 2019-11-29 PROCEDURE — 36591 DRAW BLOOD OFF VENOUS DEVICE: CPT

## 2019-11-29 PROCEDURE — 86644 CMV ANTIBODY: CPT

## 2019-11-29 PROCEDURE — 36430 TRANSFUSION BLD/BLD COMPNT: CPT

## 2019-11-29 PROCEDURE — 83735 ASSAY OF MAGNESIUM: CPT

## 2019-11-29 RX ORDER — SODIUM CHLORIDE 9 MG/ML
250 INJECTION, SOLUTION INTRAVENOUS AS NEEDED
Status: DISCONTINUED | OUTPATIENT
Start: 2019-11-29 | End: 2019-12-02

## 2019-11-29 RX ADMIN — DULOXETINE 30 MG: 30 CAPSULE, DELAYED RELEASE ORAL at 08:16

## 2019-11-29 RX ADMIN — CAMPHOR AND MENTHOL: 5; 5 LOTION TOPICAL at 16:26

## 2019-11-29 RX ADMIN — POTASSIUM CHLORIDE 20 MEQ: 20 TABLET, EXTENDED RELEASE ORAL at 08:16

## 2019-11-29 RX ADMIN — PIPERACILLIN AND TAZOBACTAM 3.38 G: 3; .375 INJECTION, POWDER, FOR SOLUTION INTRAVENOUS at 14:18

## 2019-11-29 RX ADMIN — CAMPHOR AND MENTHOL: 5; 5 LOTION TOPICAL at 08:16

## 2019-11-29 RX ADMIN — DIPHENHYDRAMINE HYDROCHLORIDE 25 MG: 25 CAPSULE ORAL at 14:18

## 2019-11-29 RX ADMIN — LOPERAMIDE HYDROCHLORIDE 2 MG: 2 CAPSULE ORAL at 06:44

## 2019-11-29 RX ADMIN — ACYCLOVIR 400 MG: 800 TABLET ORAL at 17:14

## 2019-11-29 RX ADMIN — VORICONAZOLE 200 MG: 200 TABLET, FILM COATED ORAL at 08:16

## 2019-11-29 RX ADMIN — ACYCLOVIR 400 MG: 800 TABLET ORAL at 08:16

## 2019-11-29 RX ADMIN — Medication 2 TABLET: at 08:15

## 2019-11-29 RX ADMIN — DRONABINOL 2.5 MG: 2.5 CAPSULE ORAL at 08:16

## 2019-11-29 RX ADMIN — PIPERACILLIN AND TAZOBACTAM 3.38 G: 3; .375 INJECTION, POWDER, FOR SOLUTION INTRAVENOUS at 05:16

## 2019-11-29 RX ADMIN — ALLOPURINOL 300 MG: 300 TABLET ORAL at 08:15

## 2019-11-29 RX ADMIN — PIPERACILLIN AND TAZOBACTAM 3.38 G: 3; .375 INJECTION, POWDER, FOR SOLUTION INTRAVENOUS at 21:36

## 2019-11-29 RX ADMIN — ONDANSETRON 4 MG: 2 INJECTION INTRAMUSCULAR; INTRAVENOUS at 08:15

## 2019-11-29 RX ADMIN — CAMPHOR AND MENTHOL: 5; 5 LOTION TOPICAL at 21:36

## 2019-11-29 RX ADMIN — TBO-FILGRASTIM 480 MCG: 480 INJECTION, SOLUTION SUBCUTANEOUS at 21:37

## 2019-11-29 RX ADMIN — TEMAZEPAM 15 MG: 15 CAPSULE ORAL at 21:37

## 2019-11-29 RX ADMIN — ACETAMINOPHEN 650 MG: 325 TABLET, FILM COATED ORAL at 14:18

## 2019-11-29 RX ADMIN — POTASSIUM CHLORIDE 20 MEQ: 20 TABLET, EXTENDED RELEASE ORAL at 17:14

## 2019-11-29 RX ADMIN — VORICONAZOLE 200 MG: 200 TABLET, FILM COATED ORAL at 21:37

## 2019-11-29 RX ADMIN — PRAVASTATIN SODIUM 10 MG: 20 TABLET ORAL at 21:37

## 2019-11-29 RX ADMIN — LORATADINE 10 MG: 10 TABLET ORAL at 08:16

## 2019-11-29 RX ADMIN — LORAZEPAM 1 MG: 1 TABLET ORAL at 21:37

## 2019-11-29 NOTE — PROGRESS NOTES
Pt is actively awaiting Count Recovery. No discharge needs at this time. .  Please consult  if any new issues arise Case management will continue to follow.

## 2019-11-29 NOTE — PROGRESS NOTES
END OF SHIFT NOTE: 
 
Intake/Output 11/28 1901 - 11/29 0700 In: 726.2 [P.O.:600; I.V.:126.2] Out: 850 [Urine:850] Voiding: YES Catheter: NO 
Drain:   
 
 
 
 
 
Stool:  2 occurrences. Stool Assessment Stool Color: Martin Simmonds (11/28/19 2119) Stool Appearance: Loose (11/28/19 2119) Stool Amount: Medium (11/28/19 2119) Stool Source/Status: Rectum (11/28/19 2119) Emesis:  0 occurrences. VITAL SIGNS Patient Vitals for the past 12 hrs: 
 Temp Pulse Resp BP SpO2  
11/29/19 0230 98.8 °F (37.1 °C) 94 18 146/65 94 % 11/28/19 2309 99.5 °F (37.5 °C) 99 18 139/58 92 % 11/28/19 1930 99.3 °F (37.4 °C) (!) 105 18 137/64 93 % Pain Assessment Pain 1 Pain Scale 1: Numeric (0 - 10) (11/29/19 0230) Pain Intensity 1: 0 (11/29/19 0230) Patient Stated Pain Goal: 0 (11/29/19 0230) Pain Reassessment 1: Yes (11/24/19 1025) Pain Onset 1: suddenly (11/24/19 1000) Pain Location 1: Abdomen (11/24/19 1000) Pain Orientation 1: Left;Right; Anterior (11/24/19 1000) Pain Description 1: Cramping (11/24/19 1000) Pain Intervention(s) 1: Medication (see MAR) (11/24/19 1000) Ambulating Yes;standby assist 
 
Additional Information: Afebrile;no bleeding. X2 loose to watery  BM. States not feeling well;weak;falls precautions reinforced. Shift report given to oncoming nurse Deniz Whitt at the bedside.  
 
Biju Beal RN

## 2019-11-29 NOTE — PROGRESS NOTES
END OF SHIFT NOTE: 
 
Intake/Output 11/29 0701 - 11/29 1900 In: 1119.1 [P.O.:713; I.V.:69.4] Out: 300 [Urine:300] Voiding: YES Catheter: NO 
Drain:   
 
 
 
 
 
Stool:  1 occurrences. Stool Assessment Stool Color: Savannah Havers (11/29/19 0645) Stool Appearance: Watery (11/29/19 0645) Stool Amount: Medium (11/29/19 0645) Stool Source/Status: Rectum (11/29/19 0645) Emesis:  0 occurrences. VITAL SIGNS Patient Vitals for the past 12 hrs: 
 Temp Pulse Resp BP SpO2  
11/29/19 1651 99.1 °F (37.3 °C) 92 17 140/65 97 % 11/29/19 1620 98.8 °F (37.1 °C) 91 16 140/66 98 % 11/29/19 1519 98.8 °F (37.1 °C) 92 17 145/60 92 % 11/29/19 1448 98.5 °F (36.9 °C) (!) 101 16 121/47 92 % 11/29/19 1137 99.2 °F (37.3 °C) 100 18 113/56 93 % 11/29/19 0713 99.3 °F (37.4 °C) 96 18 133/67 97 % Pain Assessment Pain 1 Pain Scale 1: Numeric (0 - 10) (11/29/19 1423) Pain Intensity 1: 0 (11/29/19 1423) Patient Stated Pain Goal: 0 (11/29/19 1423) Pain Reassessment 1: Yes (11/29/19 1423) Pain Onset 1: suddenly (11/24/19 1000) Pain Location 1: Abdomen (11/24/19 1000) Pain Orientation 1: Left;Right; Anterior (11/24/19 1000) Pain Description 1: Cramping (11/24/19 1000) Pain Intervention(s) 1: Medication (see MAR) (11/24/19 1000) Ambulating Yes Additional Information: Pt feeling better today. Zofran given once for nausea. Ambulated in the [place] with . One unit of PRBCs given. Pt tolerated well. No complaints noted at this time. Shift report given to Indy Churn, RN oncoming nurse at the bedside.  
 
Oni Maryann, RN

## 2019-11-29 NOTE — PROGRESS NOTES
[facility] Hematology & Oncology Inpatient Hematology / Oncology Daily Progress Note Reason for Admission:  Admission for antineoplastic chemotherapy [Z51.11] 24 Hour Events: 
BCx- + enterococcus/streptococcus Repeat BC NTD Day 22 FLAG-VENITA Awaiting count recovery, on Granix ROS: 
Constitutional: +fatigue -fever CV: Negative for chest pain, palpitations, edema. Respiratory: Negative for dyspnea, cough, wheezing. GI: Negative for nausea, abdominal pain. Diarrhea. 10 point review of systems is otherwise negative with the exception of the elements mentioned above in the HPI. No Known Allergies Past Medical History:  
Diagnosis Date  AML (acute myeloid leukemia) ([facility] [address] dx- 4/2019  
 followed by dr Benton Bradley  Depression  Hypercholesterolemia  Infection   
 of port -- was placed 5/2019-- removed 6/2019---right chest  
 Psychiatric disorder   
 aniexty  Sleep apnea Past Surgical History:  
Procedure Laterality Date  HX OTHER SURGICAL    
 colonoscopy  HX VASCULAR ACCESS    
 IR INSERT TUNL CVC W PORT OVER 5 YEARS  4/30/2019  IR INSERT TUNL CVC W PORT OVER 5 YEARS  7/15/2019  IR REMOVE TUNL CVAD W PORT/PUMP  6/13/2019 Family History Problem Relation Age of Onset  Cancer Father Social History Socioeconomic History  Marital status:  Spouse name: Not on file  Number of children: Not on file  Years of education: Not on file  Highest education level: Not on file Occupational History  Not on file Social Needs  Financial resource strain: Not on file  Food insecurity:  
  Worry: Not on file Inability: Not on file  Transportation needs:  
  Medical: Not on file Non-medical: Not on file Tobacco Use  Smoking status: Never Smoker  Smokeless tobacco: Never Used Substance and Sexual Activity  Alcohol use: Never Frequency: Never  Drug use: Never  Sexual activity: Not on file Lifestyle  Physical activity:  
  Days per week: Not on file Minutes per session: Not on file  Stress: Not on file Relationships  Social connections:  
  Talks on phone: Not on file Gets together: Not on file Attends [employer] witness service: Not on file Active member of club or organization: Not on file Attends meetings of clubs or organizations: Not on file Relationship status: Not on file  Intimate partner violence:  
  Fear of current or ex partner: Not on file Emotionally abused: Not on file Physically abused: Not on file Forced sexual activity: Not on file Other Topics Concern [address] Service Not Asked  Blood Transfusions Not Asked  Caffeine Concern Not Asked  Occupational Exposure Not Asked Colie Maizes Hazards Not Asked  Sleep Concern Not Asked  Stress Concern Not Asked  Weight Concern Not Asked  Special Diet Not Asked  Back Care Not Asked  Exercise Not Asked  Bike Helmet Not Asked  Seat Belt Not Asked  Self-Exams Not Asked Social History Narrative  Not on file Current Facility-Administered Medications Medication Dose Route Frequency Provider Last Rate Last Dose  
 0.9% sodium chloride infusion 250 mL  250 mL IntraVENous PRN Vipin Ramirez MD      
 dronabinol (MARINOL) capsule 2.5 mg  2.5 mg Oral DAILY Rosie Montoya NP   2.5 mg at 11/29/19 4478  temazepam (RESTORIL) capsule 15 mg  15 mg Oral QHS Samantha Peters NP   15 mg at 11/28/19 2108  camphor-menthol (SARNA) 0.5-0.5 % lotion   Topical TID Keith Chua NP      
 piperacillin-tazobactam (ZOSYN) 3.375 g in 0.9% sodium chloride (MBP/ADV) 100 mL  3.375 g IntraVENous Q8H Abbi Yen MD 25 mL/hr at 11/29/19 0516 3.375 g at 11/29/19 5886  sodium chloride 0.9 % bolus infusion 500 mL  500 mL IntraVENous QHS Keith Chua NP   500 mL at 11/21/19 2315  baclofen (LIORESAL) tablet 5 mg  5 mg Oral Q8H PRN Vipin Ramirez MD   5 mg at 11/21/19 5313  potassium chloride (K-DUR, KLOR-CON) SR tablet 20 mEq  20 mEq Oral BID Dora Horner MD   20 mEq at 11/29/19 0816  
 albuterol (PROVENTIL VENTOLIN) nebulizer solution 2.5 mg  2.5 mg Nebulization Q6H PRN Maribeth Mackey NP   2.5 mg at 11/21/19 1600  
 alum-mag hydroxide-simeth (MYLANTA) oral suspension 30 mL  30 mL Oral Q4H PRN Dora Horner MD   30 mL at 11/21/19 3183  loperamide (IMODIUM) capsule 2 mg  2 mg Oral Q4H PRN Dora Horner MD   2 mg at 11/29/19 5525  loratadine (CLARITIN) tablet 10 mg  10 mg Oral DAILY Simón Cm NP   10 mg at 11/29/19 3698  central line flush (saline) syringe 10 mL  10 mL InterCATHeter PRN Dora Horner MD   10 mL at 11/28/19 2109  
 docusate sodium (COLACE) capsule 100 mg  100 mg Oral BID PRN Bria Younger NP   100 mg at 11/11/19 1308  LORazepam (ATIVAN) injection 0.5 mg  0.5 mg IntraVENous Q6H PRN Dora Horner MD   0.5 mg at 11/27/19 1201  
 saline peripheral flush soln 10 mL  10 mL InterCATHeter PRN Dora Horner MD   10 mL at 11/23/19 2229  
 heparin (porcine) pf 300-500 Units  300-500 Units InterCATHeter PRN Dora Horner MD      
 tbo-filgrastim [facility]) injection 480 mcg  480 mcg SubCUTAneous QHS Dora Horner MD   480 mcg at 11/28/19 2108  acyclovir (ZOVIRAX) tablet 400 mg  400 mg Oral BID Bria Younger NP   400 mg at 11/29/19 4958  allopurinol (ZYLOPRIM) tablet 300 mg  300 mg Oral DAILY Bria Younger NP   300 mg at 11/29/19 5916  cholecalciferol (VITAMIN D3) (400 Units /10 mcg) tablet 2 Tab  800 Units Oral DAILY Bria Younger NP   2 Tab at 11/29/19 9046  DULoxetine (CYMBALTA) capsule 30 mg  30 mg Oral DAILY Bria Younger NP   30 mg at 11/29/19 4051  lidocaine-prilocaine (EMLA) 2.5-2.5 % cream   Topical PRN Bria , NP      
 LORazepam (ATIVAN) tablet 1 mg  1 mg Oral QHS Bria , NP   1 mg at 11/28/19 2108  pravastatin (PRAVACHOL) tablet 10 mg  10 mg Oral QHS Bria , NP   10 mg at 11/28/19 2108  voriconazole (VFEND) tablet 200 mg  200 mg Oral Q12H Farrell Iha, NP   200 mg at 19 5744  ondansetron (ZOFRAN) injection 4 mg  4 mg IntraVENous Q4H PRN Farrell Iha, NP   4 mg at 19 6063  prochlorperazine (COMPAZINE) with saline injection 5 mg  5 mg IntraVENous Q6H PRN Farrell Iha, NP   5 mg at 19 1632  
 HYDROcodone-acetaminophen (NORCO) 5-325 mg per tablet 1 Tab  1 Tab Oral Q6H PRN Farrell Iha, NP   1 Tab at 19 5323  morphine injection 2 mg  2 mg IntraVENous Q4H PRN Farrell Iha, NP      
 acetaminophen (TYLENOL) tablet 650 mg  650 mg Oral Q6H PRN Willy Alston MD   650 mg at 19 0370  diphenhydrAMINE (BENADRYL) capsule 25 mg  25 mg Oral Q6H PRN Willy Alston MD   25 mg at 19 8486  acetaminophen/diphenhydrAMINE (TYLENOL PM EXT STR) 500/25 mg (Patient Supplied)   Oral QHS Willy Alston MD      
 vit C,Z-La-uscqo-lutein-zeaxan (PRESERVISION AREDS-2) capsule 1 Cap (Patient Supplied)  [address] Willy Alston MD   1 Cap at 19 4321 OBJECTIVE: 
Patient Vitals for the past 8 hrs: 
 BP Temp Pulse Resp SpO2  
19 0713 133/67 99.3 °F (37.4 °C) 96 18 97 % 19 0230 146/65 98.8 °F (37.1 °C) 94 18 94 % Temp (24hrs), Av.5 °F (36.9 °C), Min:97.5 °F (36.4 °C), Max:99.5 °F (37.5 °C) 
 
 07 - 1900 In: 187.4 [P.O.:118; I.V.:69.4] Out: - Physical Exam: 
Constitutional: Well developed, frail appearing female in no acute distress, sitting in bed HEENT: Normocephalic and atraumatic. Oropharynx is clear, mucous membranes are moist. Extraocular muscles are intact. Sclerae anicteric. Skin Warm and dry. Scattered rash. No erythema. No pallor. Bruising noted on BUE/R elbow and abdomen. Respiratory Small crackles at bases,  normal air exchange without accessory muscle use. CVS Normal rate, regular rhythm and normal S1 and S2. No murmurs, gallops, or rubs. Abdomen Soft, mildly tender across lower abd-improving, nondistended, normoactive bowel sounds. Neuro Grossly nonfocal with no obvious sensory or motor deficits. MSK Normal range of motion in general.  No edema and no tenderness. Psych Appropriate mood and affect. Labs:   
Recent Results (from the past 24 hour(s)) METABOLIC PANEL, COMPREHENSIVE Collection Time: 11/29/19  2:22 AM  
Result Value Ref Range Sodium 144 136 - 145 mmol/L Potassium 4.2 3.5 - 5.1 mmol/L Chloride 110 (H) 98 - 107 mmol/L  
 CO2 28 21 - 32 mmol/L Anion gap 6 (L) 7 - 16 mmol/L Glucose 90 65 - 100 mg/dL BUN 15 8 - 23 MG/DL Creatinine 0.61 0.6 - 1.0 MG/DL  
 GFR est AA >60 >60 ml/min/1.73m2 GFR est non-AA >60 >60 ml/min/1.73m2 Calcium 8.3 8.3 - 10.4 MG/DL Bilirubin, total 0.3 0.2 - 1.1 MG/DL  
 ALT (SGPT) 38 12 - 65 U/L  
 AST (SGOT) 21 15 - 37 U/L Alk. phosphatase 76 50 - 136 U/L Protein, total 6.0 (L) 6.3 - 8.2 g/dL Albumin 1.9 (L) 3.2 - 4.6 g/dL Globulin 4.1 (H) 2.3 - 3.5 g/dL A-G Ratio 0.5 (L) 1.2 - 3.5 MAGNESIUM Collection Time: 11/29/19  2:22 AM  
Result Value Ref Range Magnesium 2.0 1.8 - 2.4 mg/dL LD Collection Time: 11/29/19  2:22 AM  
Result Value Ref Range  110 - 210 U/L  
URIC ACID Collection Time: 11/29/19  2:22 AM  
Result Value Ref Range Uric acid 0.9 (L) 2.6 - 6.0 MG/DL  
PHOSPHORUS Collection Time: 11/29/19  2:22 AM  
Result Value Ref Range Phosphorus 3.5 2.3 - 3.7 MG/DL  
CBC WITH AUTOMATED DIFF Collection Time: 11/29/19  2:22 AM  
Result Value Ref Range WBC 0.0 (LL) 4.3 - 11.1 K/uL  
 RBC 2.36 (L) 4.05 - 5.2 M/uL HGB 7.0 (L) 11.7 - 15.4 g/dL HCT 21.4 (L) 35.8 - 46.3 % MCV 90.7 79.6 - 97.8 FL  
 MCH 29.7 26.1 - 32.9 PG  
 MCHC 32.7 31.4 - 35.0 g/dL  
 RDW 13.2 11.9 - 14.6 % PLATELET 39 (L) 489 - 450 K/uL MPV 11.2 9.4 - 12.3 FL ABSOLUTE NRBC 0.00 0.0 - 0.2 K/uL  
 DF AUTOMATED NEUTROPHILS 50 43 - 78 % LYMPHOCYTES 50 (H) 13 - 44 % MONOCYTES 0 (L) 4.0 - 12.0 % EOSINOPHILS 0 (L) 0.5 - 7.8 % BASOPHILS 0 0.0 - 2.0 % IMMATURE GRANULOCYTES 0 0.0 - 5.0 %  
 ABS. NEUTROPHILS 0.0 (L) 1.7 - 8.2 K/UL  
 ABS. LYMPHOCYTES 0.0 (L) 0.5 - 4.6 K/UL  
 ABS. MONOCYTES 0.0 (L) 0.1 - 1.3 K/UL  
 ABS. EOSINOPHILS 0.0 0.0 - 0.8 K/UL  
 ABS. BASOPHILS 0.0 0.0 - 0.2 K/UL  
 ABS. IMM. GRANS. 0.0 0.0 - 0.5 K/UL  
TYPE & SCREEN Collection Time: 11/29/19  2:22 AM  
Result Value Ref Range Crossmatch Expiration 12/02/2019 ABO/Rh(D) AB POSITIVE Antibody screen NEG Unit number [id number] Blood component type RC LRIR Unit division 00 Status of unit ALLOCATED Crossmatch result Compatible Imaging: 
CT HEAD WO CONT [[id number]] Collected: 11/19/19 1050 Order Status: Completed Updated: 11/19/19 1054 Narrative:    
CT HEAD WITHOUT CONTRAST, 11/19/2019 History: Fall last night hitting left side of head Comparison: . Technique:   5 mm axial scans from the skull base to the vertex. All CT scans 
performed at this facility use one or all of the following: Automated exposure 
control, adjustment of the mA and/or kVp according to patient's size, iterative 
reconstruction. Findings:  No evidence of intracranial hemorrhage is seen. Faint bilateral basal 
ganglia calcifications are seen. No abnormal extra-axial fluid collections are 
seen.  Mild cortical involutional changes are seen which are not felt to be 
abnormal given the patient's age. Rubina Shingles evidence for acute hydrocephalus is seen. No evidence of midline shift or herniation is seen.  No abnormal edema pattern 
is seen in a vascular distribution to suggest large artery infarction. Evaluation with bone windows shows no acute osseous changes.  The visualized 
sinuses, middle ears, and mastoid air cells are well aerated.   
Impression:    
IMPRESSION:   
1.  No acute intracranial process evident by noncontrast CT study of the head.  
 
  
XR CHEST SNGL V [[id number]] Collected: 11/18/19 2041 Order Status: Completed Updated: 11/18/19 2044 Narrative:    
EXAM: XR CHEST SNGL V 
 
INDICATION: neutropenic fever COMPARISON: 9/29/2019 FINDINGS: A portable AP radiograph of the chest was obtained at 1946 hours. The 
patient is on a cardiac monitor. The lungs are clear. The cardiac and 
mediastinal contours and pulmonary vascularity are normal.  The bones and soft 
tissues are grossly within normal limits. Impression:    
IMPRESSION: Normal chest.  
XR ELBOW RT MIN 3 V [[id number]] Collected: 11/10/19 1421 Order Status: Completed Updated: 11/10/19 1424 Narrative:    
Right elbow Clinical location: Elbow pain after feeling a pop, decreased range of motion FINDINGS: Four views of the right elbow show no fracture or dislocation. There 
is no joint effusion. The soft tissues are unremarkable. Impression:    
IMPRESSION: No acute osseous abnormality or joint derangement of the right 
elbow. ASSESSMENT: 
Problem List  Date Reviewed: 10/31/2019 Codes Class Noted Febrile neutropenia (HCC) ICD-10-CM: D70.9, R50.81 ICD-9-CM: 288.00, 780.61  9/21/2019 Pancytopenia due to antineoplastic chemotherapy [facility]) ICD-10-CM: D61.810, T45.1X5A 
ICD-9-CM: 284.11, E933.1  6/12/2019 Cellulitis of neck ICD-10-CM: A23.900 ICD-9-CM: 682.1  6/12/2019 Immunocompromised status associated with infection ICD-10-CM: B99.9 ICD-9-CM: 136.9  6/12/2019 Port or reservoir infection ICD-10-CM: M63.093P ICD-9-CM: 999.33  6/12/2019 Acute myeloid leukemia not having achieved remission ([facility] ICD-10-CM: C92.00 ICD-9-CM: 205.00  5/9/2019 Admission for antineoplastic chemotherapy ICD-10-CM: Z51.11 ICD-9-CM: V58.11  5/5/2019 AML (acute myeloblastic leukemia) ([facility] ICD-10-CM: C92.00 ICD-9-CM: 205.00  4/28/2019 Weakness generalized ICD-10-CM: R53.1 ICD-9-CM: 780.79  4/28/2019 Pancytopenia ([facility] ICD-10-CM: A42.903 ICD-9-CM: 284.19  4/28/2019 Thrombocytopenia ([facility] ICD-10-CM: D69.6 ICD-9-CM: 287.5  4/27/2019 Ms. Aminata Yadav is a 68 y.o. female admitted on 11/7/2019. She is a known patient of Dr. 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 She failed induction with 7+3/Midostaurin. On Dacogen/Gilteritinib with recent BMbx with persistent residual disease. She is being admitted for FLAG-VENITA. She is feeling well and is ready to proceed with treatment. PLAN: 
AML 
- admit for salvage FLAG-VENITA 
- needs Echo prior - ordered 11/8 Day 1 FLAG-VENITA. Echo with EF 55-60%, proceed with tx. 
11/9 Day 2 FLAG-VENITA. Tolerating well, no issues. Uric acid 3.1 today. 11/10 Day 3 FLAG-VENITA. No issues with treatment. Uric acid 3.3 today. 11/12 Day 5 FLAG-VENITA. Tolerating treatment well. G-CSF to start tomorrow. 11/13 Day 6 FLAG-VENITA, doing well, Granix/claritin starts today 11/19 Day 12 FLAG-VENITA. Awaiting count recovery, con't Granix. 11/20 Day 13 FLAG-VENITA. WBC 0. Continue Granix. 11/27 Day 20 FLAG VENITA. WBC 0.0 Pancytopenia secondary to disease - Transfuse prn per Alexander SOPs 
11/19 Transfuse PRBCs today R elbow pain 11/11 c/o R elbow pain with limited ROM yesterday. Xray neg. Pain improved today, noted bruising. Normal ROM on exam. 
 
Mild Abd discomfort/loose stools 11/13 discomfort is improved from yesterday, will monitor 11/14 loose stools, but not watery, can use Imodium prn 
11/15 Imodium working well  
11/16 controlled 11/26 Ova and parasite 11/22 pending. Check for C diff if stool appropriate. Continue anti diarrheal for now Neutropenic fever / UTI 
11/19 Tmax 102. 1.  UA +UTI. UCx pending. BCx-NGTD. CXR neg. On Cef/Vanc. DC prophylactic LVQ. 11/20 Tmax 102. BCx positive for streptococcus/enterococcus. Subculture in progress. On Cef/Vanc.   
11/22 repeat BC NTD. Alpha strep on vanc. 11/24 Repeat BC NTD. No fever. 11/25 new skin rash. ?RT to vanc. Consult ID for recommendations. 11/26 ID dc'd cefe and vanc. Started zosyn. TTE ordered. Sepsis not present on admission 11/21  BCx positive for streptococcus/enterococcus. Fall 
11/19 s/p last PM.  No LOC. Hit head. CT head neg. Double vision 11/21 Neuro has seen patient. Concerns for possible 6th nerve palsy. Recommends LP. We will hold with WBC 0.0 and continue to monitor. 11/22 vision improved today. MRI brain pending. 11/23 MRI unremarkable. Discussed with Neuro. No need for LP 
11/27 Resolved Continue home meds Prophylactic Antibx: Acyclovir, Voriconazole Mily SOPs SCDs for DVT prophylaxis (AC contraindicated d/t thrombocytopenia) Goals and plan of care reviewed with the patient. All questions answered to the best of our ability. Update to plan of care 11/29/Disposition: Day 22 FLAG-VENITA. Awaiting count recovery. Using IS for crackles noted on exam. Marinol started yesterday for appetite- not much improvement noted yet. Transfusions per mily SOPs. Encouraged to get out of bed to the chair or to walk the halls. Will need BMbx prior to discharge but not prior to day 28 chemo. Upon discharge will need twice weekly labs w transfusions as needed. Weekly provider visits. RTC within 1 week from discharge. Encourage use of IS. Rosie \"Rosie Deunas\" GEETA Montoya [facility] Hematology and Oncology [address] [place] [address] Office : [phone] Fax : [phone] I personally saw, exammed and counselled the patient, and discussed with NP, agree with above history/assessment/plan. 73 y. o.female AML with FLT3 mut s/p multiple lines of chemo and Gilteritinib but refractory, received salvage FLAG-VENITA, day 22, strep A and enterococcus sepsis controlled, ANC still 0, rash likely related to antibiotics, give antihistamine and rash fainted, ANC 0, changed to zosyn, increased diarrhea control, b/l lung basilar crackles, use incentive spirometry, plt support, marinol for anorexia, supportive care per Carlos Enrique Garza M.D. [address] Office : [phone] Fax : [phone]

## 2019-11-29 NOTE — PROGRESS NOTES
Problem: Falls - Risk of 
Goal: *Absence of Falls Description Document Kamaljit Monaco Fall Risk and appropriate interventions in the flowsheet. Outcome: Progressing Towards Goal 
Note: Fall Risk Interventions: 
Mobility Interventions: Communicate number of staff needed for ambulation/transfer, Patient to call before getting OOB, Strengthening exercises (ROM-active/passive), Utilize walker, cane, or other assistive device Medication Interventions: Teach patient to arise slowly, Patient to call before getting OOB Elimination Interventions: Call light in reach, Elevated toilet seat, Patient to call for help with toileting needs, Stay With Me (per policy), Toilet paper/wipes in reach, Toileting schedule/hourly rounds History of Falls Interventions: Room close to nurse's station

## 2019-11-30 LAB
ABO + RH BLD: NORMAL
ALBUMIN SERPL-MCNC: 1.9 G/DL (ref 3.2–4.6)
ALBUMIN/GLOB SERPL: 0.5 {RATIO} (ref 1.2–3.5)
ALP SERPL-CCNC: 75 U/L (ref 50–136)
ALT SERPL-CCNC: 29 U/L (ref 12–65)
ANION GAP SERPL CALC-SCNC: 6 MMOL/L (ref 7–16)
AST SERPL-CCNC: 19 U/L (ref 15–37)
BILIRUB SERPL-MCNC: 0.3 MG/DL (ref 0.2–1.1)
BLD PROD TYP BPU: NORMAL
BLOOD GROUP ANTIBODIES SERPL: NORMAL
BPU ID: NORMAL
BUN SERPL-MCNC: 16 MG/DL (ref 8–23)
CALCIUM SERPL-MCNC: 8.3 MG/DL (ref 8.3–10.4)
CHLORIDE SERPL-SCNC: 109 MMOL/L (ref 98–107)
CO2 SERPL-SCNC: 27 MMOL/L (ref 21–32)
CREAT SERPL-MCNC: 0.6 MG/DL (ref 0.6–1)
CROSSMATCH RESULT,%XM: NORMAL
DIFFERENTIAL METHOD BLD: ABNORMAL
ERYTHROCYTE [DISTWIDTH] IN BLOOD BY AUTOMATED COUNT: 12.9 % (ref 11.9–14.6)
GLOBULIN SER CALC-MCNC: 4.1 G/DL (ref 2.3–3.5)
GLUCOSE SERPL-MCNC: 101 MG/DL (ref 65–100)
HCT VFR BLD AUTO: 22.9 % (ref 35.8–46.3)
HGB BLD-MCNC: 7.5 G/DL (ref 11.7–15.4)
LDH SERPL L TO P-CCNC: 184 U/L (ref 110–210)
MAGNESIUM SERPL-MCNC: 1.9 MG/DL (ref 1.8–2.4)
MCH RBC QN AUTO: 30 PG (ref 26.1–32.9)
MCHC RBC AUTO-ENTMCNC: 32.8 G/DL (ref 31.4–35)
MCV RBC AUTO: 91.6 FL (ref 79.6–97.8)
NRBC # BLD: 0 K/UL (ref 0–0.2)
PHOSPHATE SERPL-MCNC: 2.9 MG/DL (ref 2.3–3.7)
PLATELET # BLD AUTO: 21 K/UL (ref 150–450)
PLATELET COMMENTS,PCOM: ABNORMAL
PMV BLD AUTO: 11.1 FL (ref 9.4–12.3)
POTASSIUM SERPL-SCNC: 4.3 MMOL/L (ref 3.5–5.1)
PROT SERPL-MCNC: 6 G/DL (ref 6.3–8.2)
RBC # BLD AUTO: 2.5 M/UL (ref 4.05–5.2)
RBC MORPH BLD: ABNORMAL
SODIUM SERPL-SCNC: 142 MMOL/L (ref 136–145)
SPECIMEN EXP DATE BLD: NORMAL
STATUS OF UNIT,%ST: NORMAL
UNIT DIVISION, %UDIV: 0
URATE SERPL-MCNC: 1.1 MG/DL (ref 2.6–6)
WBC # BLD AUTO: 0 K/UL (ref 4.3–11.1)
WBC MORPH BLD: ABNORMAL

## 2019-11-30 PROCEDURE — 65270000015 HC RM PRIVATE ONCOLOGY

## 2019-11-30 PROCEDURE — 83615 LACTATE (LD) (LDH) ENZYME: CPT

## 2019-11-30 PROCEDURE — 74011250636 HC RX REV CODE- 250/636: Performed by: INTERNAL MEDICINE

## 2019-11-30 PROCEDURE — 84100 ASSAY OF PHOSPHORUS: CPT

## 2019-11-30 PROCEDURE — 74011250637 HC RX REV CODE- 250/637: Performed by: NURSE PRACTITIONER

## 2019-11-30 PROCEDURE — 65270000029 HC RM PRIVATE

## 2019-11-30 PROCEDURE — 74011000258 HC RX REV CODE- 258: Performed by: INTERNAL MEDICINE

## 2019-11-30 PROCEDURE — 74011250636 HC RX REV CODE- 250/636: Performed by: NURSE PRACTITIONER

## 2019-11-30 PROCEDURE — 74011250637 HC RX REV CODE- 250/637: Performed by: INTERNAL MEDICINE

## 2019-11-30 PROCEDURE — 84550 ASSAY OF BLOOD/URIC ACID: CPT

## 2019-11-30 PROCEDURE — 80053 COMPREHEN METABOLIC PANEL: CPT

## 2019-11-30 PROCEDURE — 85025 COMPLETE CBC W/AUTO DIFF WBC: CPT

## 2019-11-30 PROCEDURE — 36591 DRAW BLOOD OFF VENOUS DEVICE: CPT

## 2019-11-30 PROCEDURE — 83735 ASSAY OF MAGNESIUM: CPT

## 2019-11-30 PROCEDURE — 99232 SBSQ HOSP IP/OBS MODERATE 35: CPT | Performed by: INTERNAL MEDICINE

## 2019-11-30 PROCEDURE — 77030020256 HC SOL INJ NACL 0.9%  500ML

## 2019-11-30 RX ADMIN — POTASSIUM CHLORIDE 20 MEQ: 20 TABLET, EXTENDED RELEASE ORAL at 17:21

## 2019-11-30 RX ADMIN — Medication 2 TABLET: at 07:20

## 2019-11-30 RX ADMIN — ALUMINUM HYDROXIDE, MAGNESIUM HYDROXIDE, AND SIMETHICONE 30 ML: 200; 200; 20 SUSPENSION ORAL at 09:10

## 2019-11-30 RX ADMIN — DULOXETINE 30 MG: 30 CAPSULE, DELAYED RELEASE ORAL at 07:20

## 2019-11-30 RX ADMIN — VORICONAZOLE 200 MG: 200 TABLET, FILM COATED ORAL at 07:21

## 2019-11-30 RX ADMIN — ACYCLOVIR 400 MG: 800 TABLET ORAL at 07:20

## 2019-11-30 RX ADMIN — SODIUM CHLORIDE 500 ML: 900 INJECTION, SOLUTION INTRAVENOUS at 22:05

## 2019-11-30 RX ADMIN — ONDANSETRON 4 MG: 2 INJECTION INTRAMUSCULAR; INTRAVENOUS at 20:38

## 2019-11-30 RX ADMIN — POTASSIUM CHLORIDE 20 MEQ: 20 TABLET, EXTENDED RELEASE ORAL at 07:21

## 2019-11-30 RX ADMIN — CAMPHOR AND MENTHOL: 5; 5 LOTION TOPICAL at 22:06

## 2019-11-30 RX ADMIN — TEMAZEPAM 15 MG: 15 CAPSULE ORAL at 22:05

## 2019-11-30 RX ADMIN — PIPERACILLIN AND TAZOBACTAM 3.38 G: 3; .375 INJECTION, POWDER, FOR SOLUTION INTRAVENOUS at 13:43

## 2019-11-30 RX ADMIN — LORAZEPAM 1 MG: 1 TABLET ORAL at 22:05

## 2019-11-30 RX ADMIN — DRONABINOL 2.5 MG: 2.5 CAPSULE ORAL at 07:20

## 2019-11-30 RX ADMIN — PRAVASTATIN SODIUM 10 MG: 20 TABLET ORAL at 22:05

## 2019-11-30 RX ADMIN — PIPERACILLIN AND TAZOBACTAM 3.38 G: 3; .375 INJECTION, POWDER, FOR SOLUTION INTRAVENOUS at 22:05

## 2019-11-30 RX ADMIN — ACYCLOVIR 400 MG: 800 TABLET ORAL at 17:21

## 2019-11-30 RX ADMIN — LOPERAMIDE HYDROCHLORIDE 2 MG: 2 CAPSULE ORAL at 07:19

## 2019-11-30 RX ADMIN — CAMPHOR AND MENTHOL: 5; 5 LOTION TOPICAL at 07:23

## 2019-11-30 RX ADMIN — LORATADINE 10 MG: 10 TABLET ORAL at 07:20

## 2019-11-30 RX ADMIN — VORICONAZOLE 200 MG: 200 TABLET, FILM COATED ORAL at 22:05

## 2019-11-30 RX ADMIN — TBO-FILGRASTIM 480 MCG: 480 INJECTION, SOLUTION SUBCUTANEOUS at 22:05

## 2019-11-30 RX ADMIN — ALLOPURINOL 300 MG: 300 TABLET ORAL at 07:19

## 2019-11-30 RX ADMIN — PIPERACILLIN AND TAZOBACTAM 3.38 G: 3; .375 INJECTION, POWDER, FOR SOLUTION INTRAVENOUS at 06:14

## 2019-11-30 NOTE — PROGRESS NOTES
END OF SHIFT NOTE: 
 
Intake/Output 11/30 0701 - 11/30 1900 In: 672 [I.V.:672] Out: -   
Voiding: YES Catheter: NO 
Drain:   
 
 
 
 
 
Stool:  1 occurrences. Stool Assessment Stool Color: Roayl Clock (11/29/19 0645) Stool Appearance: Watery (11/30/19 0717) Stool Amount: Medium (11/29/19 0645) Stool Source/Status: Rectum (11/29/19 0645) Emesis:  0 occurrences. VITAL SIGNS Patient Vitals for the past 12 hrs: 
 Temp Pulse Resp BP SpO2  
11/30/19 1656 99.2 °F (37.3 °C) (!) 101 18 146/75 93 % 11/30/19 1202 98.8 °F (37.1 °C) (!) 106 18 131/60 92 % 11/30/19 0751 98.2 °F (36.8 °C) (!) 108 18 100/53 91 % Pain Assessment Pain 1 Pain Scale 1: Numeric (0 - 10) (11/30/19 0717) Pain Intensity 1: 0 (11/30/19 0717) Patient Stated Pain Goal: 0 (11/30/19 0717) Pain Reassessment 1: Patient resting w/respiratory rate greater than 10 (11/30/19 0259) Pain Onset 1: suddenly (11/24/19 1000) Pain Location 1: Abdomen (11/24/19 1000) Pain Orientation 1: Left;Right; Anterior (11/24/19 1000) Pain Description 1: Cramping (11/24/19 1000) Pain Intervention(s) 1: Medication (see MAR) (11/24/19 1000) Ambulating Yes Additional Information: Pt had episode of diarrhea this am. Imodium given.  at the bedside. Pt sleeping most of the day. Pt did two laps around the floor. No complaints noted at this time. Shift report given to Leonard Leyva RN oncoming nurse at the bedside.  
 
Neo Medina RN

## 2019-11-30 NOTE — PROGRESS NOTES
[facility] Inpatient Hematology / Oncology Daily Progress Note Reason for Admission:  Admission for antineoplastic chemotherapy [Z51.11] 24 Hour Events: 
BCx- + enterococcus/streptococcus Repeat BC NTD Day 23 FLAG-VENITA Awaiting count recovery, on Granix ROS: 
Constitutional: +fatigue -fever CV: Negative for chest pain, palpitations, edema. Respiratory: Negative for dyspnea, cough, wheezing. GI: Negative for nausea, abdominal pain. Diarrhea. 10 point review of systems is otherwise negative with the exception of the elements mentioned above in the HPI. No Known Allergies Past Medical History:  
Diagnosis Date  AML (acute myeloid leukemia) ([facility] [address] dx- 4/2019  
 followed by dr Lakisha Francois  Depression  Hypercholesterolemia  Infection   
 of port -- was placed 5/2019-- removed 6/2019---right chest  
 Psychiatric disorder   
 aniexty  Sleep apnea Past Surgical History:  
Procedure Laterality Date  HX OTHER SURGICAL    
 colonoscopy  HX VASCULAR ACCESS    
 IR INSERT TUNL CVC W PORT OVER 5 YEARS  4/30/2019  IR INSERT TUNL CVC W PORT OVER 5 YEARS  7/15/2019  IR REMOVE TUNL CVAD W PORT/PUMP  6/13/2019 Family History Problem Relation Age of Onset  Cancer Father Social History Socioeconomic History  Marital status:  Spouse name: Not on file  Number of children: Not on file  Years of education: Not on file  Highest education level: Not on file Occupational History  Not on file Social Needs  Financial resource strain: Not on file  Food insecurity:  
  Worry: Not on file Inability: Not on file  Transportation needs:  
  Medical: Not on file Non-medical: Not on file Tobacco Use  Smoking status: Never Smoker  Smokeless tobacco: Never Used Substance and Sexual Activity  Alcohol use: Never Frequency: Never  Drug use: Never  Sexual activity: Not on file Lifestyle  Physical activity:  
  Days per week: Not on file Minutes per session: Not on file  Stress: Not on file Relationships  Social connections:  
  Talks on phone: Not on file Gets together: Not on file Attends Spiritism service: Not on file Active member of club or organization: Not on file Attends meetings of clubs or organizations: Not on file Relationship status: Not on file  Intimate partner violence:  
  Fear of current or ex partner: Not on file Emotionally abused: Not on file Physically abused: Not on file Forced sexual activity: Not on file Other Topics Concern [address] Service Not Asked  Blood Transfusions Not Asked  Caffeine Concern Not Asked  Occupational Exposure Not Asked Julia Mulch Hazards Not Asked  Sleep Concern Not Asked  Stress Concern Not Asked  Weight Concern Not Asked  Special Diet Not Asked  Back Care Not Asked  Exercise Not Asked  Bike Helmet Not Asked  Seat Belt Not Asked  Self-Exams Not Asked Social History Narrative  Not on file Current Facility-Administered Medications Medication Dose Route Frequency Provider Last Rate Last Dose  
 0.9% sodium chloride infusion 250 mL  250 mL IntraVENous PRN Sarah Crow MD      
 dronabinol (MARINOL) capsule 2.5 mg  2.5 mg Oral DAILY Rosie Montoya NP   2.5 mg at 11/30/19 0720  temazepam (RESTORIL) capsule 15 mg  15 mg Oral QHS Yassine Medal, NP   15 mg at 11/29/19 2137  camphor-menthol (SARNA) 0.5-0.5 % lotion   Topical TID Marcus Varner NP      
 piperacillin-tazobactam (ZOSYN) 3.375 g in 0.9% sodium chloride (MBP/ADV) 100 mL  3.375 g IntraVENous Q8H Mohsen Torres MD 25 mL/hr at 11/30/19 0614 3.375 g at 11/30/19 7529  sodium chloride 0.9 % bolus infusion 500 mL  500 mL IntraVENous QHS Marcus Varner NP   500 mL at 11/21/19 2315  baclofen (LIORESAL) tablet 5 mg  5 mg Oral Q8H PRN Sarah Crow MD   5 mg at 11/21/19 8532  potassium chloride (K-DUR, KLOR-CON) SR tablet 20 mEq  20 mEq Oral BID Sasha Graf MD   20 mEq at 11/30/19 0721  
 albuterol (PROVENTIL VENTOLIN) nebulizer solution 2.5 mg  2.5 mg Nebulization Q6H PRN Serenity Thorpe NP   2.5 mg at 11/21/19 1600  
 alum-mag hydroxide-simeth (MYLANTA) oral suspension 30 mL  30 mL Oral Q4H PRN Sasha Graf MD   30 mL at 11/30/19 6572  loperamide (IMODIUM) capsule 2 mg  2 mg Oral Q4H PRN Sasha Graf MD   2 mg at 11/30/19 5666  loratadine (CLARITIN) tablet 10 mg  10 mg Oral DAILY Coit Christa, NP   10 mg at 11/30/19 3318  central line flush (saline) syringe 10 mL  10 mL InterCATHeter PRN Sasha Graf MD   10 mL at 11/28/19 2109  
 docusate sodium (COLACE) capsule 100 mg  100 mg Oral BID PRN Dianne Harris NP   100 mg at 11/11/19 1308  LORazepam (ATIVAN) injection 0.5 mg  0.5 mg IntraVENous Q6H PRN Sasha Graf MD   0.5 mg at 11/27/19 1201  
 saline peripheral flush soln 10 mL  10 mL InterCATHeter PRN Sasha Graf MD   10 mL at 11/23/19 2229  
 heparin (porcine) pf 300-500 Units  300-500 Units InterCATHeter PRN Sasha Graf MD      
 tbo-filgrastim [facility]) injection 480 mcg  480 mcg SubCUTAneous QHS Sasha Graf MD   480 mcg at 11/29/19 2137  
 acyclovir (ZOVIRAX) tablet 400 mg  400 mg Oral BID Dianne Harris NP   400 mg at 11/30/19 0720  allopurinol (ZYLOPRIM) tablet 300 mg  300 mg Oral DAILY Dianne Harris NP   300 mg at 11/30/19 0619  cholecalciferol (VITAMIN D3) (400 Units /10 mcg) tablet 2 Tab  800 Units Oral DAILY Dianne Harris NP   2 Tab at 11/30/19 3165  DULoxetine (CYMBALTA) capsule 30 mg  30 mg Oral DAILY Dianne Harris, NP   30 mg at 11/30/19 0720  lidocaine-prilocaine (EMLA) 2.5-2.5 % cream   Topical PRN Dianne Harris NP      
 LORazepam (ATIVAN) tablet 1 mg  1 mg Oral QHS Dianne Harris, NP   1 mg at 11/29/19 2137  pravastatin (PRAVACHOL) tablet 10 mg  10 mg Oral QHS Dianne Harris, NP   10 mg at 11/29/19 2137  voriconazole (VFEND) tablet 200 mg  200 mg Oral Q12H Leigh Hashimoto, NP   200 mg at 19 5533  ondansetron (ZOFRAN) injection 4 mg  4 mg IntraVENous Q4H PRN Leigh Hashimoto, NP   4 mg at 19 1823  prochlorperazine (COMPAZINE) with saline injection 5 mg  5 mg IntraVENous Q6H PRN Leigh Hashimoto, NP   5 mg at 19 1632  
 HYDROcodone-acetaminophen (NORCO) 5-325 mg per tablet 1 Tab  1 Tab Oral Q6H PRN Leigh Hashimoto, NP   1 Tab at 19 5872  morphine injection 2 mg  2 mg IntraVENous Q4H PRN Leigh Hashimoto, NP      
 acetaminophen (TYLENOL) tablet 650 mg  650 mg Oral Q6H PRN Rheba Givens, MD   650 mg at 19 1418  diphenhydrAMINE (BENADRYL) capsule 25 mg  25 mg Oral Q6H PRN Rheba Givens, MD   25 mg at 19 1418  acetaminophen/diphenhydrAMINE (TYLENOL PM EXT STR) 500/25 mg (Patient Supplied)   Oral QHS Rheba Givens, MD      
 vit C,G-Lp-dcuqk-lutein-zeaxan (PRESERVISION AREDS-2) capsule 1 Cap (Patient Supplied)  [address] Rheba Givens, MD   1 Cap at 19 0147 OBJECTIVE: 
Patient Vitals for the past 8 hrs: 
 BP Temp Pulse Resp SpO2  
19 0751 100/53 98.2 °F (36.8 °C) (!) 108 18 91 % 19 0300 147/62 99.1 °F (37.3 °C) 91 18 96 % Temp (24hrs), Av.7 °F (37.1 °C), Min:98 °F (36.7 °C), Max:99.2 °F (37.3 °C) 
 
 0701 -  1900 In: 672 [I.V.:672] Out: - Physical Exam: 
Constitutional: Well developed, frail appearing female in no acute distress, sitting in bed HEENT: Normocephalic and atraumatic. Oropharynx is clear, mucous membranes are moist. Extraocular muscles are intact. Sclerae anicteric. Skin Warm and dry. Scattered rash. No erythema. No pallor. Bruising noted on BUE/R elbow and abdomen. Respiratory Small crackles at bases,  normal air exchange without accessory muscle use. CVS Normal rate, regular rhythm and normal S1 and S2. No murmurs, gallops, or rubs.   
Abdomen Soft, mildly tender across lower abd-improving, nondistended, normoactive bowel sounds. Neuro Grossly nonfocal with no obvious sensory or motor deficits. MSK Normal range of motion in general.  No edema and no tenderness. Psych Appropriate mood and affect. Labs:   
Recent Results (from the past 24 hour(s)) METABOLIC PANEL, COMPREHENSIVE Collection Time: 11/30/19  2:53 AM  
Result Value Ref Range Sodium 142 136 - 145 mmol/L Potassium 4.3 3.5 - 5.1 mmol/L Chloride 109 (H) 98 - 107 mmol/L  
 CO2 27 21 - 32 mmol/L Anion gap 6 (L) 7 - 16 mmol/L Glucose 101 (H) 65 - 100 mg/dL BUN 16 8 - 23 MG/DL Creatinine 0.60 0.6 - 1.0 MG/DL  
 GFR est AA >60 >60 ml/min/1.73m2 GFR est non-AA >60 >60 ml/min/1.73m2 Calcium 8.3 8.3 - 10.4 MG/DL Bilirubin, total 0.3 0.2 - 1.1 MG/DL  
 ALT (SGPT) 29 12 - 65 U/L  
 AST (SGOT) 19 15 - 37 U/L Alk. phosphatase 75 50 - 136 U/L Protein, total 6.0 (L) 6.3 - 8.2 g/dL Albumin 1.9 (L) 3.2 - 4.6 g/dL Globulin 4.1 (H) 2.3 - 3.5 g/dL A-G Ratio 0.5 (L) 1.2 - 3.5 MAGNESIUM Collection Time: 11/30/19  2:53 AM  
Result Value Ref Range Magnesium 1.9 1.8 - 2.4 mg/dL LD Collection Time: 11/30/19  2:53 AM  
Result Value Ref Range  110 - 210 U/L  
URIC ACID Collection Time: 11/30/19  2:53 AM  
Result Value Ref Range Uric acid 1.1 (L) 2.6 - 6.0 MG/DL  
PHOSPHORUS Collection Time: 11/30/19  2:53 AM  
Result Value Ref Range Phosphorus 2.9 2.3 - 3.7 MG/DL  
CBC WITH AUTOMATED DIFF Collection Time: 11/30/19  2:53 AM  
Result Value Ref Range WBC 0.0 (LL) 4.3 - 11.1 K/uL  
 RBC 2.50 (L) 4.05 - 5.2 M/uL HGB 7.5 (L) 11.7 - 15.4 g/dL HCT 22.9 (L) 35.8 - 46.3 % MCV 91.6 79.6 - 97.8 FL  
 MCH 30.0 26.1 - 32.9 PG  
 MCHC 32.8 31.4 - 35.0 g/dL  
 RDW 12.9 11.9 - 14.6 % PLATELET 21 (LL) 349 - 450 K/uL MPV 11.1 9.4 - 12.3 FL ABSOLUTE NRBC 0.00 0.0 - 0.2 K/uL  
 RBC COMMENTS NORMOCYTIC/NORMOCHROMIC    
 WBC COMMENTS WBC TOO FEW TO DIFFERENTIATE PLATELET COMMENTS MARKED    
 DF MANUAL Imaging: 
CT HEAD WO CONT [[id number]] Collected: 11/19/19 1050 Order Status: Completed Updated: 11/19/19 1054 Narrative:    
CT HEAD WITHOUT CONTRAST, 11/19/2019 History: Fall last night hitting left side of head Comparison: . Technique:   5 mm axial scans from the skull base to the vertex. All CT scans 
performed at this facility use one or all of the following: Automated exposure 
control, adjustment of the mA and/or kVp according to patient's size, iterative 
reconstruction. Findings:  No evidence of intracranial hemorrhage is seen. Faint bilateral basal 
ganglia calcifications are seen. No abnormal extra-axial fluid collections are 
seen.  Mild cortical involutional changes are seen which are not felt to be 
abnormal given the patient's age. Marea Chavez evidence for acute hydrocephalus is seen. No evidence of midline shift or herniation is seen.  No abnormal edema pattern 
is seen in a vascular distribution to suggest large artery infarction. Evaluation with bone windows shows no acute osseous changes.  The visualized 
sinuses, middle ears, and mastoid air cells are well aerated. Impression:    
IMPRESSION:   
1.  No acute intracranial process evident by noncontrast CT study of the head. XR CHEST SNGL V [[id number]] Collected: 11/18/19 2041 Order Status: Completed Updated: 11/18/19 2044 Narrative:    
EXAM: XR CHEST SNGL V 
 
INDICATION: neutropenic fever COMPARISON: 9/29/2019 FINDINGS: A portable AP radiograph of the chest was obtained at 1946 hours. The 
patient is on a cardiac monitor. The lungs are clear. The cardiac and 
mediastinal contours and pulmonary vascularity are normal.  The bones and soft 
tissues are grossly within normal limits. Impression:    
IMPRESSION: Normal chest.  
XR ELBOW RT MIN 3 V [[id number]] Collected: 11/10/19 1421 Order Status: Completed Updated: 11/10/19 1424 Narrative:    
Right elbow Clinical location: Elbow pain after feeling a pop, decreased range of motion FINDINGS: Four views of the right elbow show no fracture or dislocation. There 
is no joint effusion. The soft tissues are unremarkable. Impression:    
IMPRESSION: No acute osseous abnormality or joint derangement of the right 
elbow. ASSESSMENT: 
Problem List  Date Reviewed: 10/31/2019 Codes Class Noted Febrile neutropenia (HCC) ICD-10-CM: D70.9, R50.81 ICD-9-CM: 288.00, 780.61  9/21/2019 Pancytopenia due to antineoplastic chemotherapy [facility]) ICD-10-CM: D61.810, T45.1X5A 
ICD-9-CM: 284.11, E933.1  6/12/2019 Cellulitis of neck ICD-10-CM: M74.443 ICD-9-CM: 682.1  6/12/2019 Immunocompromised status associated with infection ICD-10-CM: B99.9 ICD-9-CM: 136.9  6/12/2019 Port or reservoir infection ICD-10-CM: Y71.981U ICD-9-CM: 999.33  6/12/2019 Acute myeloid leukemia not having achieved remission ([facility] ICD-10-CM: C92.00 ICD-9-CM: 205.00  5/9/2019 Admission for antineoplastic chemotherapy ICD-10-CM: Z51.11 ICD-9-CM: V58.11  5/5/2019 AML (acute myeloblastic leukemia) ([facility] [address] ICD-10-CM: C92.00 ICD-9-CM: 205.00  4/28/2019 Weakness generalized ICD-10-CM: R53.1 ICD-9-CM: 780.79  4/28/2019 Pancytopenia ([facility] [address] ICD-10-CM: X93.904 ICD-9-CM: 284.19  4/28/2019 Thrombocytopenia ([facility] ICD-10-CM: D69.6 ICD-9-CM: 287.5  4/27/2019 Ms. Derrel Carrel is a 68 y.o. female admitted on 11/7/2019. She is a known patient of Dr. 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 She failed induction with 7+3/Midostaurin. On Dacogen/Gilteritinib with recent BMbx with persistent residual disease. She is being admitted for FLAG-VENITA. She is feeling well and is ready to proceed with treatment. PLAN: 
AML 
- admit for salvage FLAG-VENITA 
- needs Echo prior - ordered 11/8 Day 1 FLAG-VENITA. Echo with EF 55-60%, proceed with tx. 11/9 Day 2 FLAG-VENITA. Tolerating well, no issues. Uric acid 3.1 today. 11/10 Day 3 FLAG-VENITA. No issues with treatment. Uric acid 3.3 today. 11/12 Day 5 FLAG-VENITA. Tolerating treatment well. G-CSF to start tomorrow. 11/13 Day 6 FLAG-VENITA, doing well, Granix/claritin starts today 11/19 Day 12 FLAG-VENITA. Awaiting count recovery, con't Granix. 11/20 Day 13 FLAG-VENITA. WBC 0. Continue Granix. 11/27 Day 20 FLAG VENITA. WBC 0.0 Pancytopenia secondary to disease - Transfuse prn per [facility] SOPs 
11/19 Transfuse PRBCs today R elbow pain 11/11 c/o R elbow pain with limited ROM yesterday. Xray neg. Pain improved today, noted bruising. Normal ROM on exam. 
 
Mild Abd discomfort/loose stools 11/13 discomfort is improved from yesterday, will monitor 11/14 loose stools, but not watery, can use Imodium prn 
11/15 Imodium working well  
11/16 controlled 11/26 Ova and parasite 11/22 pending. Check for C diff if stool appropriate. Continue anti diarrheal for now Neutropenic fever / UTI 
11/19 Tmax 102. 1.  UA +UTI. UCx pending. BCx-NGTD. CXR neg. On Cef/Vanc. DC prophylactic LVQ. 11/20 Tmax 102. BCx positive for streptococcus/enterococcus. Subculture in progress. On Cef/Vanc.   
11/22 repeat BC NTD. Alpha strep on vanc. 11/24 Repeat BC NTD. No fever. 11/25 new skin rash. ?RT to vanc. Consult ID for recommendations. 11/26 ID dc'd cefe and vanc. Started zosyn. TTE ordered. Sepsis not present on admission 11/21  BCx positive for streptococcus/enterococcus. Fall 
11/19 s/p last PM.  No LOC. Hit head. CT head neg. Double vision 11/21 Neuro has seen patient. Concerns for possible 6th nerve palsy. Recommends LP. We will hold with WBC 0.0 and continue to monitor. 11/22 vision improved today. MRI brain pending. 11/23 MRI unremarkable. Discussed with Neuro. No need for LP 
11/27 Resolved Continue home meds Prophylactic Antibx: Acyclovir, Voriconazole Alexander SOPs SCDs for DVT prophylaxis (AC contraindicated d/t thrombocytopenia) Goals and plan of care reviewed with the patient. All questions answered to the best of our ability. Update to plan of care 11/29/Disposition: Day 22 FLAG-VENITA. Awaiting count recovery. Using IS for crackles noted on exam. Marinol started yesterday for appetite- not much improvement noted yet. Transfusions per mily SOPs. Encouraged to get out of bed to the chair or to walk the halls. Will need BMbx prior to discharge but not prior to day 28 chemo. Upon discharge will need twice weekly labs w transfusions as needed. Weekly provider visits. RTC within 1 week from discharge. Encourage use of IS. I personally saw, exammed and counselled the patient, and discussed with NP, agree with above history/assessment/plan. 73 y. o.female AML with FLT3 mut s/p multiple lines of chemo and Gilteritinib but refractory, received salvage FLAG-VENITA, day 23, strep A and enterococcus sepsis controlled, ANC still 0, rash likely related to antibiotics, give antihistamine and rash fainted, ANC 0, changed to zosyn, b/l lung basilar crackles, use incentive spirometry, plt support, marinol for anorexia, diarrhea appeared related to Ensure, change to lactose free, supportive care per SOP. 
  
 
Jc Manriquez M.D. [address] Office : [phone] Fax : [phone]

## 2019-12-01 ENCOUNTER — APPOINTMENT (OUTPATIENT)
Dept: GENERAL RADIOLOGY | Age: 74
DRG: 837 | End: 2019-12-01
Attending: NURSE PRACTITIONER
Payer: MEDICARE

## 2019-12-01 LAB
ALBUMIN SERPL-MCNC: 1.9 G/DL (ref 3.2–4.6)
ALBUMIN/GLOB SERPL: 0.5 {RATIO} (ref 1.2–3.5)
ALP SERPL-CCNC: 74 U/L (ref 50–136)
ALT SERPL-CCNC: 26 U/L (ref 12–65)
ANION GAP SERPL CALC-SCNC: 6 MMOL/L (ref 7–16)
APPEARANCE UR: CLEAR
AST SERPL-CCNC: 14 U/L (ref 15–37)
BACTERIA URNS QL MICRO: 0 /HPF
BILIRUB SERPL-MCNC: 0.4 MG/DL (ref 0.2–1.1)
BILIRUB UR QL: NEGATIVE
BUN SERPL-MCNC: 12 MG/DL (ref 8–23)
CALCIUM SERPL-MCNC: 8.3 MG/DL (ref 8.3–10.4)
CASTS URNS QL MICRO: ABNORMAL /LPF
CHLORIDE SERPL-SCNC: 108 MMOL/L (ref 98–107)
CO2 SERPL-SCNC: 26 MMOL/L (ref 21–32)
COLOR UR: YELLOW
CREAT SERPL-MCNC: 0.55 MG/DL (ref 0.6–1)
DIFFERENTIAL METHOD BLD: ABNORMAL
EPI CELLS #/AREA URNS HPF: ABNORMAL /HPF
ERYTHROCYTE [DISTWIDTH] IN BLOOD BY AUTOMATED COUNT: 13 % (ref 11.9–14.6)
GLOBULIN SER CALC-MCNC: 4.1 G/DL (ref 2.3–3.5)
GLUCOSE SERPL-MCNC: 98 MG/DL (ref 65–100)
GLUCOSE UR STRIP.AUTO-MCNC: NEGATIVE MG/DL
HCT VFR BLD AUTO: 22.2 % (ref 35.8–46.3)
HGB BLD-MCNC: 7.6 G/DL (ref 11.7–15.4)
HGB UR QL STRIP: NEGATIVE
KETONES UR QL STRIP.AUTO: NEGATIVE MG/DL
LDH SERPL L TO P-CCNC: 170 U/L (ref 110–210)
LEUKOCYTE ESTERASE UR QL STRIP.AUTO: NEGATIVE
MAGNESIUM SERPL-MCNC: 1.9 MG/DL (ref 1.8–2.4)
MCH RBC QN AUTO: 31.3 PG (ref 26.1–32.9)
MCHC RBC AUTO-ENTMCNC: 34.2 G/DL (ref 31.4–35)
MCV RBC AUTO: 91.4 FL (ref 79.6–97.8)
NITRITE UR QL STRIP.AUTO: NEGATIVE
NRBC # BLD: 0 K/UL (ref 0–0.2)
PH UR STRIP: 7 [PH] (ref 5–9)
PHOSPHATE SERPL-MCNC: 2.9 MG/DL (ref 2.3–3.7)
PLATELET # BLD AUTO: 10 K/UL (ref 150–450)
PLATELET COMMENTS,PCOM: ABNORMAL
PMV BLD AUTO: 11.1 FL (ref 9.4–12.3)
POTASSIUM SERPL-SCNC: 4 MMOL/L (ref 3.5–5.1)
PROT SERPL-MCNC: 6 G/DL (ref 6.3–8.2)
PROT UR STRIP-MCNC: 30 MG/DL
RBC # BLD AUTO: 2.43 M/UL (ref 4.05–5.2)
RBC #/AREA URNS HPF: ABNORMAL /HPF
RBC MORPH BLD: ABNORMAL
SODIUM SERPL-SCNC: 140 MMOL/L (ref 136–145)
SP GR UR REFRACTOMETRY: 1.02 (ref 1–1.02)
URATE SERPL-MCNC: 1 MG/DL (ref 2.6–6)
UROBILINOGEN UR QL STRIP.AUTO: 0.2 EU/DL (ref 0.2–1)
WBC # BLD AUTO: 0 K/UL (ref 4.3–11.1)
WBC MORPH BLD: ABNORMAL
WBC URNS QL MICRO: ABNORMAL /HPF

## 2019-12-01 PROCEDURE — 71045 X-RAY EXAM CHEST 1 VIEW: CPT

## 2019-12-01 PROCEDURE — 86644 CMV ANTIBODY: CPT

## 2019-12-01 PROCEDURE — 77030020256 HC SOL INJ NACL 0.9%  500ML

## 2019-12-01 PROCEDURE — 74011250637 HC RX REV CODE- 250/637: Performed by: INTERNAL MEDICINE

## 2019-12-01 PROCEDURE — 74011250637 HC RX REV CODE- 250/637: Performed by: NURSE PRACTITIONER

## 2019-12-01 PROCEDURE — 84100 ASSAY OF PHOSPHORUS: CPT

## 2019-12-01 PROCEDURE — 83615 LACTATE (LD) (LDH) ENZYME: CPT

## 2019-12-01 PROCEDURE — 83735 ASSAY OF MAGNESIUM: CPT

## 2019-12-01 PROCEDURE — 74011000258 HC RX REV CODE- 258: Performed by: INTERNAL MEDICINE

## 2019-12-01 PROCEDURE — 87086 URINE CULTURE/COLONY COUNT: CPT

## 2019-12-01 PROCEDURE — 80053 COMPREHEN METABOLIC PANEL: CPT

## 2019-12-01 PROCEDURE — 65270000015 HC RM PRIVATE ONCOLOGY

## 2019-12-01 PROCEDURE — 36430 TRANSFUSION BLD/BLD COMPNT: CPT

## 2019-12-01 PROCEDURE — 74011250636 HC RX REV CODE- 250/636: Performed by: INTERNAL MEDICINE

## 2019-12-01 PROCEDURE — 84550 ASSAY OF BLOOD/URIC ACID: CPT

## 2019-12-01 PROCEDURE — P9037 PLATE PHERES LEUKOREDU IRRAD: HCPCS

## 2019-12-01 PROCEDURE — 85025 COMPLETE CBC W/AUTO DIFF WBC: CPT

## 2019-12-01 PROCEDURE — 65270000029 HC RM PRIVATE

## 2019-12-01 PROCEDURE — 81001 URINALYSIS AUTO W/SCOPE: CPT

## 2019-12-01 PROCEDURE — 87040 BLOOD CULTURE FOR BACTERIA: CPT

## 2019-12-01 PROCEDURE — 99232 SBSQ HOSP IP/OBS MODERATE 35: CPT | Performed by: INTERNAL MEDICINE

## 2019-12-01 PROCEDURE — 36415 COLL VENOUS BLD VENIPUNCTURE: CPT

## 2019-12-01 PROCEDURE — 36591 DRAW BLOOD OFF VENOUS DEVICE: CPT

## 2019-12-01 RX ORDER — VANCOMYCIN 2 GRAM/500 ML IN 0.9 % SODIUM CHLORIDE INTRAVENOUS
2000 ONCE
Status: COMPLETED | OUTPATIENT
Start: 2019-12-01 | End: 2019-12-01

## 2019-12-01 RX ORDER — VANCOMYCIN/0.9 % SOD CHLORIDE 750 MG/250
750 PLASTIC BAG, INJECTION (ML) INTRAVENOUS EVERY 12 HOURS
Status: DISCONTINUED | OUTPATIENT
Start: 2019-12-02 | End: 2019-12-03

## 2019-12-01 RX ORDER — SODIUM CHLORIDE 9 MG/ML
250 INJECTION, SOLUTION INTRAVENOUS AS NEEDED
Status: DISCONTINUED | OUTPATIENT
Start: 2019-12-01 | End: 2019-12-12 | Stop reason: HOSPADM

## 2019-12-01 RX ADMIN — PRAVASTATIN SODIUM 10 MG: 20 TABLET ORAL at 22:32

## 2019-12-01 RX ADMIN — CAMPHOR AND MENTHOL: 5; 5 LOTION TOPICAL at 08:00

## 2019-12-01 RX ADMIN — VANCOMYCIN HYDROCHLORIDE 2000 MG: 10 INJECTION, POWDER, LYOPHILIZED, FOR SOLUTION INTRAVENOUS at 20:48

## 2019-12-01 RX ADMIN — HYDROCODONE BITARTRATE AND ACETAMINOPHEN 1 TABLET: 5; 325 TABLET ORAL at 02:42

## 2019-12-01 RX ADMIN — PIPERACILLIN AND TAZOBACTAM 3.38 G: 3; .375 INJECTION, POWDER, FOR SOLUTION INTRAVENOUS at 14:02

## 2019-12-01 RX ADMIN — POTASSIUM CHLORIDE 20 MEQ: 20 TABLET, EXTENDED RELEASE ORAL at 16:42

## 2019-12-01 RX ADMIN — LORATADINE 10 MG: 10 TABLET ORAL at 07:45

## 2019-12-01 RX ADMIN — VORICONAZOLE 200 MG: 200 TABLET, FILM COATED ORAL at 07:45

## 2019-12-01 RX ADMIN — PIPERACILLIN AND TAZOBACTAM 3.38 G: 3; .375 INJECTION, POWDER, FOR SOLUTION INTRAVENOUS at 22:32

## 2019-12-01 RX ADMIN — VORICONAZOLE 200 MG: 200 TABLET, FILM COATED ORAL at 22:32

## 2019-12-01 RX ADMIN — HEPARIN SODIUM (PORCINE) LOCK FLUSH IV SOLN 100 UNIT/ML 300 UNITS: 100 SOLUTION at 02:45

## 2019-12-01 RX ADMIN — SODIUM CHLORIDE 250 ML: 900 INJECTION, SOLUTION INTRAVENOUS at 06:03

## 2019-12-01 RX ADMIN — LOPERAMIDE HYDROCHLORIDE 2 MG: 2 CAPSULE ORAL at 21:04

## 2019-12-01 RX ADMIN — LORAZEPAM 1 MG: 1 TABLET ORAL at 22:32

## 2019-12-01 RX ADMIN — CAMPHOR AND MENTHOL: 5; 5 LOTION TOPICAL at 22:33

## 2019-12-01 RX ADMIN — DULOXETINE 30 MG: 30 CAPSULE, DELAYED RELEASE ORAL at 07:45

## 2019-12-01 RX ADMIN — ALTEPLASE 1 MG: KIT at 14:02

## 2019-12-01 RX ADMIN — ACYCLOVIR 400 MG: 800 TABLET ORAL at 16:42

## 2019-12-01 RX ADMIN — TBO-FILGRASTIM 480 MCG: 480 INJECTION, SOLUTION SUBCUTANEOUS at 22:32

## 2019-12-01 RX ADMIN — POTASSIUM CHLORIDE 20 MEQ: 20 TABLET, EXTENDED RELEASE ORAL at 07:45

## 2019-12-01 RX ADMIN — ACYCLOVIR 400 MG: 800 TABLET ORAL at 07:45

## 2019-12-01 RX ADMIN — TEMAZEPAM 15 MG: 15 CAPSULE ORAL at 22:32

## 2019-12-01 RX ADMIN — CAMPHOR AND MENTHOL: 5; 5 LOTION TOPICAL at 16:00

## 2019-12-01 RX ADMIN — PIPERACILLIN AND TAZOBACTAM 3.38 G: 3; .375 INJECTION, POWDER, FOR SOLUTION INTRAVENOUS at 06:03

## 2019-12-01 RX ADMIN — DRONABINOL 2.5 MG: 2.5 CAPSULE ORAL at 07:45

## 2019-12-01 RX ADMIN — Medication 2 TABLET: at 07:45

## 2019-12-01 RX ADMIN — ALLOPURINOL 300 MG: 300 TABLET ORAL at 07:45

## 2019-12-01 NOTE — PROGRESS NOTES
END OF SHIFT NOTE: 
 
Intake/Output 12/01 0701 - 12/01 1900 In: 822.9 [P.O.:540; I.V.:41.9] Out: 1100 [Urine:1100] Voiding: YES Catheter: NO 
Drain:   
 
 
 
 
 
Stool:  0 occurrences. Stool Assessment Stool Color: Kaley Jefferson (11/29/19 0645) Stool Appearance: Loose(per pt) (12/01/19 0745) Stool Amount: Medium (11/29/19 0645) Stool Source/Status: Rectum (11/29/19 0645) Emesis:  0 occurrences. VITAL SIGNS Patient Vitals for the past 12 hrs: 
 Temp Pulse Resp BP SpO2  
12/01/19 1516 98.4 °F (36.9 °C) 87 18 122/58 91 % 12/01/19 1045 98.1 °F (36.7 °C) 85 19 121/55 96 % 12/01/19 1026 97.9 °F (36.6 °C) 88 17 132/53 96 % 12/01/19 0723 98.5 °F (36.9 °C) 86 18 136/67 98 % Pain Assessment Pain 1 Pain Scale 1: Numeric (0 - 10) (12/01/19 0610) Pain Intensity 1: 0 (12/01/19 0610) Patient Stated Pain Goal: 0 (12/01/19 0610) Pain Reassessment 1: Patient resting w/respiratory rate greater than 10 (12/01/19 0340) Pain Onset 1: Upon awakening (12/01/19 0242) Pain Location 1: Head (12/01/19 0242) Pain Orientation 1: Lower (12/01/19 0242) Pain Description 1: Aching (12/01/19 0242) Pain Intervention(s) 1: Medication (see MAR) (12/01/19 0242) Ambulating Yes Additional Information: Pt resting in bed with  at bedside. Pt received one unit of platelets this shift. Pt stated she felt much better today. Pt up to shower. No other needs at this time. Shift report given to oncoming nurse at the bedside. Noemi Terry

## 2019-12-01 NOTE — PROGRESS NOTES
Problem: Falls - Risk of 
Goal: *Absence of Falls Description Document [facility] Fall Risk and appropriate interventions in the flowsheet. Outcome: Progressing Towards Goal 
Note: Fall Risk Interventions: 
Mobility Interventions: Communicate number of staff needed for ambulation/transfer Medication Interventions: Evaluate medications/consider consulting pharmacy Elimination Interventions: Call light in reach History of Falls Interventions: Consult care management for discharge planning

## 2019-12-01 NOTE — PROGRESS NOTES
[facility] Inpatient Hematology / Oncology Daily Progress Note Reason for Admission:  Admission for antineoplastic chemotherapy [Z51.11] 24 Hour Events: 
BCx- + enterococcus/streptococcus Repeat BC NTD Day 24 FLAG-VENITA Awaiting count recovery, on Granix ROS: 
Constitutional: +fatigue -fever CV: Negative for chest pain, palpitations, edema. Respiratory: Negative for dyspnea, cough, wheezing. GI: Negative for nausea, abdominal pain. Diarrhea. 10 point review of systems is otherwise negative with the exception of the elements mentioned above in the HPI. No Known Allergies Past Medical History:  
Diagnosis Date  AML (acute myeloid leukemia) ([facility] [address] dx- 4/2019  
 followed by dr Elva Tejada  Depression  Hypercholesterolemia  Infection   
 of port -- was placed 5/2019-- removed 6/2019---right chest  
 Psychiatric disorder   
 aniexty  Sleep apnea Past Surgical History:  
Procedure Laterality Date  HX OTHER SURGICAL    
 colonoscopy  HX VASCULAR ACCESS    
 IR INSERT TUNL CVC W PORT OVER 5 YEARS  4/30/2019  IR INSERT TUNL CVC W PORT OVER 5 YEARS  7/15/2019  IR REMOVE TUNL CVAD W PORT/PUMP  6/13/2019 Family History Problem Relation Age of Onset  Cancer Father Social History Socioeconomic History  Marital status:  Spouse name: Not on file  Number of children: Not on file  Years of education: Not on file  Highest education level: Not on file Occupational History  Not on file Social Needs  Financial resource strain: Not on file  Food insecurity:  
  Worry: Not on file Inability: Not on file  Transportation needs:  
  Medical: Not on file Non-medical: Not on file Tobacco Use  Smoking status: Never Smoker  Smokeless tobacco: Never Used Substance and Sexual Activity  Alcohol use: Never Frequency: Never  Drug use: Never  Sexual activity: Not on file Lifestyle  Physical activity:  
  Days per week: Not on file Minutes per session: Not on file  Stress: Not on file Relationships  Social connections:  
  Talks on phone: Not on file Gets together: Not on file Attends [employer] service: Not on file Active member of club or organization: Not on file Attends meetings of clubs or organizations: Not on file Relationship status: Not on file  Intimate partner violence:  
  Fear of current or ex partner: Not on file Emotionally abused: Not on file Physically abused: Not on file Forced sexual activity: Not on file Other Topics Concern [address] Service Not Asked  Blood Transfusions Not Asked  Caffeine Concern Not Asked  Occupational Exposure Not Asked Michail Marty Hazards Not Asked  Sleep Concern Not Asked  Stress Concern Not Asked  Weight Concern Not Asked  Special Diet Not Asked  Back Care Not Asked  Exercise Not Asked  Bike Helmet Not Asked  Seat Belt Not Asked  Self-Exams Not Asked Social History Narrative  Not on file Current Facility-Administered Medications Medication Dose Route Frequency Provider Last Rate Last Dose  
 0.9% sodium chloride infusion 250 mL  250 mL IntraVENous PRN Mian Vega MD      
 acetaminophen/diphenhydrAMINE (TYLENOL PM EXT STR) 500/25 mg (Patient Supplied)   Oral QHS PRN Lexa Adam NP      
 0.9% sodium chloride infusion 250 mL  250 mL IntraVENous PRN Mian Vega MD 15 mL/hr at 12/01/19 0603 250 mL at 12/01/19 9991  dronabinol (MARINOL) capsule 2.5 mg  2.5 mg Oral DAILY Rosie Montoya NP   2.5 mg at 12/01/19 0745  temazepam (RESTORIL) capsule 15 mg  15 mg Oral QHS Kailey Remy NP   15 mg at 11/30/19 2205  camphor-menthol (SARNA) 0.5-0.5 % lotion   Topical TID Mario Quiñonez NP      
 piperacillin-tazobactam (ZOSYN) 3.375 g in 0.9% sodium chloride (MBP/ADV) 100 mL  3.375 g IntraVENous Q8H Alba Verdugo MD 25 mL/hr at 12/01/19 0603 3.375 g at 12/01/19 7436  sodium chloride 0.9 % bolus infusion 500 mL  500 mL IntraVENous QHS Christian Monday, NP   500 mL at 11/30/19 2205  baclofen (LIORESAL) tablet 5 mg  5 mg Oral Q8H PRN Monika De Jesus MD   5 mg at 11/21/19 1335  potassium chloride (K-DUR, KLOR-CON) SR tablet 20 mEq  20 mEq Oral BID Monika De Jesus MD   20 mEq at 12/01/19 0745  albuterol (PROVENTIL VENTOLIN) nebulizer solution 2.5 mg  2.5 mg Nebulization Q6H PRN Christine Jay NP   2.5 mg at 11/21/19 1600  
 alum-mag hydroxide-simeth (MYLANTA) oral suspension 30 mL  30 mL Oral Q4H PRN Monika De Jesus MD   30 mL at 11/30/19 5297  loperamide (IMODIUM) capsule 2 mg  2 mg Oral Q4H PRN Monika De Jesus MD   2 mg at 11/30/19 1889  loratadine (CLARITIN) tablet 10 mg  10 mg Oral DAILY [facility], NP   10 mg at 12/01/19 0745  central line flush (saline) syringe 10 mL  10 mL InterCATHeter PRN Monika De Jesus MD   10 mL at 11/28/19 2109  
 docusate sodium (COLACE) capsule 100 mg  100 mg Oral BID PRN Rosa Elena Medeiros NP   100 mg at 11/11/19 1308  LORazepam (ATIVAN) injection 0.5 mg  0.5 mg IntraVENous Q6H PRN Monika De Jesus MD   0.5 mg at 11/27/19 1201  
 saline peripheral flush soln 10 mL  10 mL InterCATHeter PRN Monika De Jesus MD   10 mL at 11/23/19 2229  
 heparin (porcine) pf 300-500 Units  300-500 Units InterCATHeter PRN Monika De Jesus MD   300 Units at 12/01/19 0245  tbo-filgrastim (GRANIX) injection 480 mcg  480 mcg SubCUTAneous QHS Monika De Jesus MD   480 mcg at 11/30/19 2205  acyclovir (ZOVIRAX) tablet 400 mg  400 mg Oral BID Rosa Elena Medeiros NP   400 mg at 12/01/19 0745  allopurinol (ZYLOPRIM) tablet 300 mg  300 mg Oral DAILY Rosa Elena Waldemar, NP   300 mg at 12/01/19 0775  cholecalciferol (VITAMIN D3) (400 Units /10 mcg) tablet 2 Tab  800 Units Oral DAILY Rosa Elena Waldemar, NP   2 Tab at 12/01/19 9377  DULoxetine (CYMBALTA) capsule 30 mg  30 mg Oral DAILY Leocadia Pastel, NP   30 mg at 19 0745  lidocaine-prilocaine (EMLA) 2.5-2.5 % cream   Topical PRN Leocadia Pastel, NP      
 LORazepam (ATIVAN) tablet 1 mg  1 mg Oral QHS Leocadia Pastel, NP   1 mg at 19 220  pravastatin (PRAVACHOL) tablet 10 mg  10 mg Oral QHS Leocadia Pastel, NP   10 mg at 19 220  voriconazole (VFEND) tablet 200 mg  200 mg Oral Q12H Leocadia Pastel, NP   200 mg at 19 0745  ondansetron (ZOFRAN) injection 4 mg  4 mg IntraVENous Q4H PRN Leocadia Pastel, NP   4 mg at 198  prochlorperazine (COMPAZINE) with saline injection 5 mg  5 mg IntraVENous Q6H PRN Leocadia Pastel, NP   5 mg at 19 1632  
 HYDROcodone-acetaminophen (NORCO) 5-325 mg per tablet 1 Tab  1 Tab Oral Q6H PRN Leocadia Pastel, NP   1 Tab at 19 9720  morphine injection 2 mg  2 mg IntraVENous Q4H PRN Leocadia Pastel, NP      
 acetaminophen (TYLENOL) tablet 650 mg  650 mg Oral Q6H PRN César Christopher MD   650 mg at 19 1418  diphenhydrAMINE (BENADRYL) capsule 25 mg  25 mg Oral Q6H PRN César Christopher MD   25 mg at 19 1418  vit C,I-Ua-qjgwh-lutein-zeaxan (PRESERVISION AREDS-2) capsule 1 Cap (Patient Supplied)  1 Cap Oral DAILY César Christopher MD   1 Cap at 19 4551 OBJECTIVE: 
Patient Vitals for the past 8 hrs: 
 BP Temp Pulse Resp SpO2  
19 0723 136/67 98.5 °F (36.9 °C) 86 18 98 % 19 0242 140/59 99.6 °F (37.6 °C) 92 18 93 % Temp (24hrs), Av.2 °F (37.3 °C), Min:98.5 °F (36.9 °C), Max:100 °F (37.8 °C) 
 
701 - 1900 In: 41.9 [I.V.:41.9] Out: 700 [Urine:700] Physical Exam: 
Constitutional: Well developed, frail appearing female in no acute distress, sitting in bed HEENT: Normocephalic and atraumatic. Oropharynx is clear, mucous membranes are moist. Extraocular muscles are intact. Sclerae anicteric. Skin Warm and dry. Scattered rash. No erythema. No pallor.  Bruising noted on BUE/R elbow and abdomen. Respiratory Small crackles at bases,  normal air exchange without accessory muscle use. CVS Normal rate, regular rhythm and normal S1 and S2. No murmurs, gallops, or rubs. Abdomen Soft, mildly tender across lower abd-improving, nondistended, normoactive bowel sounds. Neuro Grossly nonfocal with no obvious sensory or motor deficits. MSK Normal range of motion in general.  No edema and no tenderness. Psych Appropriate mood and affect. Labs:   
Recent Results (from the past 24 hour(s)) METABOLIC PANEL, COMPREHENSIVE Collection Time: 12/01/19  2:46 AM  
Result Value Ref Range Sodium 140 136 - 145 mmol/L Potassium 4.0 3.5 - 5.1 mmol/L Chloride 108 (H) 98 - 107 mmol/L  
 CO2 26 21 - 32 mmol/L Anion gap 6 (L) 7 - 16 mmol/L Glucose 98 65 - 100 mg/dL BUN 12 8 - 23 MG/DL Creatinine 0.55 (L) 0.6 - 1.0 MG/DL  
 GFR est AA >60 >60 ml/min/1.73m2 GFR est non-AA >60 >60 ml/min/1.73m2 Calcium 8.3 8.3 - 10.4 MG/DL Bilirubin, total 0.4 0.2 - 1.1 MG/DL  
 ALT (SGPT) 26 12 - 65 U/L  
 AST (SGOT) 14 (L) 15 - 37 U/L Alk. phosphatase 74 50 - 136 U/L Protein, total 6.0 (L) 6.3 - 8.2 g/dL Albumin 1.9 (L) 3.2 - 4.6 g/dL Globulin 4.1 (H) 2.3 - 3.5 g/dL A-G Ratio 0.5 (L) 1.2 - 3.5 MAGNESIUM Collection Time: 12/01/19  2:46 AM  
Result Value Ref Range Magnesium 1.9 1.8 - 2.4 mg/dL LD Collection Time: 12/01/19  2:46 AM  
Result Value Ref Range  110 - 210 U/L  
URIC ACID Collection Time: 12/01/19  2:46 AM  
Result Value Ref Range Uric acid 1.0 (L) 2.6 - 6.0 MG/DL  
PHOSPHORUS Collection Time: 12/01/19  2:46 AM  
Result Value Ref Range Phosphorus 2.9 2.3 - 3.7 MG/DL  
CBC WITH AUTOMATED DIFF Collection Time: 12/01/19  2:46 AM  
Result Value Ref Range WBC 0.0 (LL) 4.3 - 11.1 K/uL  
 RBC 2.43 (L) 4.05 - 5.2 M/uL HGB 7.6 (L) 11.7 - 15.4 g/dL HCT 22.2 (L) 35.8 - 46.3 %  MCV 91.4 79.6 - 97.8 FL  
 MCH 31.3 26.1 - 32.9 PG  
 MCHC 34.2 31.4 - 35.0 g/dL  
 RDW 13.0 11.9 - 14.6 % PLATELET 10 (LL) 139 - 450 K/uL MPV 11.1 9.4 - 12.3 FL ABSOLUTE NRBC 0.00 0.0 - 0.2 K/uL  
 RBC COMMENTS NORMOCYTIC/NORMOCHROMIC    
 WBC COMMENTS WBC TOO FEW TO DIFFERENTIATE PLATELET COMMENTS MARKED    
 DF MANUAL PLATELETS, ALLOCATE Collection Time: 12/01/19  4:30 AM  
Result Value Ref Range Unit number [id number] Blood component type PLTPH,LRIR,1 Unit division 00 Status of unit ALLOCATED Imaging: 
CT HEAD WO CONT [[id number]] Collected: 11/19/19 1050 Order Status: Completed Updated: 11/19/19 1054 Narrative:    
CT HEAD WITHOUT CONTRAST, 11/19/2019 History: Fall last night hitting left side of head Comparison: . Technique:   5 mm axial scans from the skull base to the vertex. All CT scans 
performed at this facility use one or all of the following: Automated exposure 
control, adjustment of the mA and/or kVp according to patient's size, iterative 
reconstruction. Findings:  No evidence of intracranial hemorrhage is seen. Faint bilateral basal 
ganglia calcifications are seen. No abnormal extra-axial fluid collections are 
seen.  Mild cortical involutional changes are seen which are not felt to be 
abnormal given the patient's age. Blank Shape evidence for acute hydrocephalus is seen. No evidence of midline shift or herniation is seen.  No abnormal edema pattern 
is seen in a vascular distribution to suggest large artery infarction. Evaluation with bone windows shows no acute osseous changes.  The visualized 
sinuses, middle ears, and mastoid air cells are well aerated. Impression:    
IMPRESSION:   
1.  No acute intracranial process evident by noncontrast CT study of the head. XR CHEST SNGL V [[id number]] Collected: 11/18/19 2041 Order Status: Completed Updated: 11/18/19 2044 Narrative:    
EXAM: XR CHEST SNGL V 
 
INDICATION: neutropenic fever COMPARISON: 9/29/2019 FINDINGS: A portable AP radiograph of the chest was obtained at 1946 hours. The 
patient is on a cardiac monitor. The lungs are clear. The cardiac and 
mediastinal contours and pulmonary vascularity are normal.  The bones and soft 
tissues are grossly within normal limits. Impression:    
IMPRESSION: Normal chest.  
XR ELBOW RT MIN 3 V [[id number]] Collected: 11/10/19 1421 Order Status: Completed Updated: 11/10/19 1424 Narrative:    
Right elbow Clinical location: Elbow pain after feeling a pop, decreased range of motion FINDINGS: Four views of the right elbow show no fracture or dislocation. There 
is no joint effusion. The soft tissues are unremarkable. Impression:    
IMPRESSION: No acute osseous abnormality or joint derangement of the right 
elbow. ASSESSMENT: 
Problem List  Date Reviewed: 10/31/2019 Codes Class Noted Febrile neutropenia (HCC) ICD-10-CM: D70.9, R50.81 ICD-9-CM: 288.00, 780.61  9/21/2019 Pancytopenia due to antineoplastic chemotherapy [facility] ICD-10-CM: D61.810, T45.1X5A 
ICD-9-CM: 284.11, E933.1  6/12/2019 Cellulitis of neck ICD-10-CM: Q96.979 ICD-9-CM: 682.1  6/12/2019 Immunocompromised status associated with infection ICD-10-CM: B99.9 ICD-9-CM: 136.9  6/12/2019 Port or reservoir infection ICD-10-CM: T28.519Q ICD-9-CM: 999.33  6/12/2019 Acute myeloid leukemia not having achieved remission ([facility] ICD-10-CM: C92.00 ICD-9-CM: 205.00  5/9/2019 Admission for antineoplastic chemotherapy ICD-10-CM: Z51.11 ICD-9-CM: V58.11  5/5/2019 AML (acute myeloblastic leukemia) ([facility] [address] ICD-10-CM: C92.00 ICD-9-CM: 205.00  4/28/2019 Weakness generalized ICD-10-CM: R53.1 ICD-9-CM: 780.79  4/28/2019 Pancytopenia ([facility] ICD-10-CM: X77.221 ICD-9-CM: 284.19  4/28/2019 Thrombocytopenia ([facility] ICD-10-CM: D69.6 ICD-9-CM: 287.5  4/27/2019 Ms. Milana Douglas is a 68 y.o. female admitted on 11/7/2019. She is a known patient of Dr. 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 She failed induction with 7+3/Midostaurin. On Dacogen/Gilteritinib with recent BMbx with persistent residual disease. She is being admitted for FLAG-VENITA. She is feeling well and is ready to proceed with treatment. PLAN: 
AML 
- admit for salvage FLAG-VENITA 
- needs Echo prior - ordered 11/8 Day 1 FLAG-VENITA. Echo with EF 55-60%, proceed with tx. 
11/9 Day 2 FLAG-VENITA. Tolerating well, no issues. Uric acid 3.1 today. 11/10 Day 3 FLAG-VENITA. No issues with treatment. Uric acid 3.3 today. 11/12 Day 5 FLAG-VENITA. Tolerating treatment well. G-CSF to start tomorrow. 11/13 Day 6 FLAG-VENITA, doing well, Granix/claritin starts today 11/19 Day 12 FLAG-VENITA. Awaiting count recovery, con't Granix. 11/20 Day 13 FLAG-VENITA. WBC 0. Continue Granix. 11/27 Day 20 FLAG VENITA. WBC 0.0 Pancytopenia secondary to disease - Transfuse prn per Alexander SOPs 
11/19 Transfuse PRBCs today R elbow pain 11/11 c/o R elbow pain with limited ROM yesterday. Xray neg. Pain improved today, noted bruising. Normal ROM on exam. 
 
Mild Abd discomfort/loose stools 11/13 discomfort is improved from yesterday, will monitor 11/14 loose stools, but not watery, can use Imodium prn 
11/15 Imodium working well  
11/16 controlled 11/26 Ova and parasite 11/22 pending. Check for C diff if stool appropriate. Continue anti diarrheal for now Neutropenic fever / UTI 
11/19 Tmax 102. 1.  UA +UTI. UCx pending. BCx-NGTD. CXR neg. On Cef/Vanc. DC prophylactic LVQ. 11/20 Tmax 102. BCx positive for streptococcus/enterococcus. Subculture in progress. On Cef/Vanc.   
11/22 repeat BC NTD. Alpha strep on vanc. 11/24 Repeat BC NTD. No fever. 11/25 new skin rash. ?RT to vanc. Consult ID for recommendations. 11/26 ID dc'd cefe and vanc. Started zosyn. TTE ordered. Sepsis not present on admission 11/21  BCx positive for streptococcus/enterococcus. Fall 
11/19 s/p last PM.  No LOC. Hit head. CT head neg. Double vision 11/21 Neuro has seen patient. Concerns for possible 6th nerve palsy. Recommends LP. We will hold with WBC 0.0 and continue to monitor. 11/22 vision improved today. MRI brain pending. 11/23 MRI unremarkable. Discussed with Neuro. No need for LP 
11/27 Resolved Continue home meds Prophylactic Antibx: Acyclovir, Voriconazole Mily SOPs SCDs for DVT prophylaxis (AC contraindicated d/t thrombocytopenia) Goals and plan of care reviewed with the patient. All questions answered to the best of our ability. Update to plan of care 11/29/Disposition: Day 22 FLAG-VENITA. Awaiting count recovery. Using IS for crackles noted on exam. Marinol started yesterday for appetite- not much improvement noted yet. Transfusions per mily SOPs. Encouraged to get out of bed to the chair or to walk the halls. Will need BMbx prior to discharge but not prior to day 28 chemo. Upon discharge will need twice weekly labs w transfusions as needed. Weekly provider visits. RTC within 1 week from discharge. Encourage use of IS. I personally saw, exammed and counselled the patient, and discussed with NP, agree with above history/assessment/plan. 73 y. o.female AML with FLT3 mut s/p multiple lines of chemo and Gilteritinib but refractory, received salvage FLAG-VENITA, day 23, strep A and enterococcus sepsis controlled, ANC still 0, rash likely related to antibiotics, give antihistamine and rash fainted, ANC 0, changed to zosyn, b/l lung basilar crackles, use incentive spirometry, plt support, marinol for anorexia, diarrhea appeared related to Ensure, change to lactose free and better, plt tx again, supportive care per Warden Patrick M.D. [address] Office : [phone] Fax : [phone]

## 2019-12-01 NOTE — PROGRESS NOTES
End of Shift Note: 7p ~ 7a Pt remains very fatigued, reported c/o nausea and c/o H/A x 1 this shift. Managed with Zofran IV and Norco PO. Maintaining neutropenic precautions. Max Temp 99.6. No s/sx abnormal bleeding this shift. 24 I&O = +969cc Continuing with current POC. Report to oncoming RN.

## 2019-12-01 NOTE — PROGRESS NOTES
Problem: Falls - Risk of 
Goal: *Absence of Falls Description Document Valarie Christinavinnie Fall Risk and appropriate interventions in the flowsheet. Outcome: Progressing Towards Goal 
Note: Fall Risk Interventions: 
Mobility Interventions: Communicate number of staff needed for ambulation/transfer, Patient to call before getting OOB Medication Interventions: Evaluate medications/consider consulting pharmacy, Patient to call before getting OOB Elimination Interventions: Call light in reach, Patient to call for help with toileting needs History of Falls Interventions: Investigate reason for fall Problem: Anemia Care Plan (Adult and Pediatric) Goal: *Labs within defined limits Outcome: Progressing Towards Goal 
Goal: *Tolerates increased activity Outcome: Progressing Towards Goal 
  
Problem: Diarrhea (Adult and Pediatrics) Goal: *Absence of diarrhea Outcome: Progressing Towards Goal 
  
Problem: Nausea/Vomiting (Adult) Goal: *Absence of nausea/vomiting Outcome: Progressing Towards Goal 
  
Problem: Nutrition Deficit Goal: *Optimize nutritional status Outcome: Progressing Towards Goal

## 2019-12-02 LAB
ALBUMIN SERPL-MCNC: 1.9 G/DL (ref 3.2–4.6)
ALBUMIN/GLOB SERPL: 0.5 {RATIO} (ref 1.2–3.5)
ALP SERPL-CCNC: 78 U/L (ref 50–136)
ALT SERPL-CCNC: 30 U/L (ref 12–65)
ANION GAP SERPL CALC-SCNC: 6 MMOL/L (ref 7–16)
AST SERPL-CCNC: 18 U/L (ref 15–37)
BASOPHILS # BLD: 0 K/UL (ref 0–0.2)
BASOPHILS NFR BLD: 0 % (ref 0–2)
BILIRUB SERPL-MCNC: 0.3 MG/DL (ref 0.2–1.1)
BLD PROD TYP BPU: NORMAL
BPU ID: NORMAL
BUN SERPL-MCNC: 11 MG/DL (ref 8–23)
CALCIUM SERPL-MCNC: 7.8 MG/DL (ref 8.3–10.4)
CHLORIDE SERPL-SCNC: 110 MMOL/L (ref 98–107)
CO2 SERPL-SCNC: 26 MMOL/L (ref 21–32)
CREAT SERPL-MCNC: 0.54 MG/DL (ref 0.6–1)
DIFFERENTIAL METHOD BLD: ABNORMAL
EOSINOPHIL # BLD: 0 K/UL (ref 0–0.8)
EOSINOPHIL NFR BLD: 0 % (ref 0.5–7.8)
ERYTHROCYTE [DISTWIDTH] IN BLOOD BY AUTOMATED COUNT: 12.9 % (ref 11.9–14.6)
GLOBULIN SER CALC-MCNC: 3.9 G/DL (ref 2.3–3.5)
GLUCOSE SERPL-MCNC: 93 MG/DL (ref 65–100)
HCT VFR BLD AUTO: 21.3 % (ref 35.8–46.3)
HGB BLD-MCNC: 7.1 G/DL (ref 11.7–15.4)
IMM GRANULOCYTES # BLD AUTO: 0 K/UL (ref 0–0.5)
IMM GRANULOCYTES NFR BLD AUTO: 0 % (ref 0–5)
LDH SERPL L TO P-CCNC: 161 U/L (ref 110–210)
LYMPHOCYTES # BLD: 0 K/UL (ref 0.5–4.6)
LYMPHOCYTES NFR BLD: 50 % (ref 13–44)
MAGNESIUM SERPL-MCNC: 1.9 MG/DL (ref 1.8–2.4)
MCH RBC QN AUTO: 30.2 PG (ref 26.1–32.9)
MCHC RBC AUTO-ENTMCNC: 33.3 G/DL (ref 31.4–35)
MCV RBC AUTO: 90.6 FL (ref 79.6–97.8)
MONOCYTES # BLD: 0 K/UL (ref 0.1–1.3)
MONOCYTES NFR BLD: 0 % (ref 4–12)
NEUTS SEG # BLD: 0 K/UL (ref 1.7–8.2)
NEUTS SEG NFR BLD: 50 % (ref 43–78)
NRBC # BLD: 0 K/UL (ref 0–0.2)
PHOSPHATE SERPL-MCNC: 2.4 MG/DL (ref 2.3–3.7)
PLATELET # BLD AUTO: 32 K/UL (ref 150–450)
PMV BLD AUTO: 9.7 FL (ref 9.4–12.3)
POTASSIUM SERPL-SCNC: 3.8 MMOL/L (ref 3.5–5.1)
PROT SERPL-MCNC: 5.8 G/DL (ref 6.3–8.2)
RBC # BLD AUTO: 2.35 M/UL (ref 4.05–5.2)
SODIUM SERPL-SCNC: 142 MMOL/L (ref 136–145)
STATUS OF UNIT,%ST: NORMAL
UNIT DIVISION, %UDIV: 0
URATE SERPL-MCNC: 1.1 MG/DL (ref 2.6–6)
WBC # BLD AUTO: 0 K/UL (ref 4.3–11.1)

## 2019-12-02 PROCEDURE — 74011000258 HC RX REV CODE- 258: Performed by: INTERNAL MEDICINE

## 2019-12-02 PROCEDURE — 99232 SBSQ HOSP IP/OBS MODERATE 35: CPT | Performed by: INTERNAL MEDICINE

## 2019-12-02 PROCEDURE — 65270000029 HC RM PRIVATE

## 2019-12-02 PROCEDURE — 74011250636 HC RX REV CODE- 250/636: Performed by: NURSE PRACTITIONER

## 2019-12-02 PROCEDURE — 74011250636 HC RX REV CODE- 250/636: Performed by: INTERNAL MEDICINE

## 2019-12-02 PROCEDURE — 83735 ASSAY OF MAGNESIUM: CPT

## 2019-12-02 PROCEDURE — 84100 ASSAY OF PHOSPHORUS: CPT

## 2019-12-02 PROCEDURE — 85025 COMPLETE CBC W/AUTO DIFF WBC: CPT

## 2019-12-02 PROCEDURE — 74011250637 HC RX REV CODE- 250/637: Performed by: NURSE PRACTITIONER

## 2019-12-02 PROCEDURE — 83615 LACTATE (LD) (LDH) ENZYME: CPT

## 2019-12-02 PROCEDURE — 80053 COMPREHEN METABOLIC PANEL: CPT

## 2019-12-02 PROCEDURE — 65270000015 HC RM PRIVATE ONCOLOGY

## 2019-12-02 PROCEDURE — 74011250637 HC RX REV CODE- 250/637: Performed by: INTERNAL MEDICINE

## 2019-12-02 PROCEDURE — 84550 ASSAY OF BLOOD/URIC ACID: CPT

## 2019-12-02 RX ADMIN — CAMPHOR AND MENTHOL: 5; 5 LOTION TOPICAL at 16:00

## 2019-12-02 RX ADMIN — VANCOMYCIN HYDROCHLORIDE 750 MG: 10 INJECTION, POWDER, LYOPHILIZED, FOR SOLUTION INTRAVENOUS at 20:26

## 2019-12-02 RX ADMIN — PRAVASTATIN SODIUM 10 MG: 20 TABLET ORAL at 22:04

## 2019-12-02 RX ADMIN — POTASSIUM CHLORIDE 20 MEQ: 20 TABLET, EXTENDED RELEASE ORAL at 09:27

## 2019-12-02 RX ADMIN — LORATADINE 10 MG: 10 TABLET ORAL at 09:27

## 2019-12-02 RX ADMIN — ONDANSETRON 4 MG: 2 INJECTION INTRAMUSCULAR; INTRAVENOUS at 08:15

## 2019-12-02 RX ADMIN — PIPERACILLIN AND TAZOBACTAM 3.38 G: 3; .375 INJECTION, POWDER, FOR SOLUTION INTRAVENOUS at 14:07

## 2019-12-02 RX ADMIN — ALLOPURINOL 300 MG: 300 TABLET ORAL at 09:26

## 2019-12-02 RX ADMIN — ACETAMINOPHEN 650 MG: 325 TABLET, FILM COATED ORAL at 20:28

## 2019-12-02 RX ADMIN — ACYCLOVIR 400 MG: 800 TABLET ORAL at 09:25

## 2019-12-02 RX ADMIN — VANCOMYCIN HYDROCHLORIDE 750 MG: 10 INJECTION, POWDER, LYOPHILIZED, FOR SOLUTION INTRAVENOUS at 09:36

## 2019-12-02 RX ADMIN — Medication 2 TABLET: at 09:26

## 2019-12-02 RX ADMIN — LOPERAMIDE HYDROCHLORIDE 2 MG: 2 CAPSULE ORAL at 12:07

## 2019-12-02 RX ADMIN — TEMAZEPAM 15 MG: 15 CAPSULE ORAL at 22:04

## 2019-12-02 RX ADMIN — CAMPHOR AND MENTHOL: 5; 5 LOTION TOPICAL at 22:04

## 2019-12-02 RX ADMIN — POTASSIUM CHLORIDE 20 MEQ: 20 TABLET, EXTENDED RELEASE ORAL at 17:33

## 2019-12-02 RX ADMIN — PIPERACILLIN AND TAZOBACTAM 3.38 G: 3; .375 INJECTION, POWDER, FOR SOLUTION INTRAVENOUS at 05:27

## 2019-12-02 RX ADMIN — VORICONAZOLE 200 MG: 200 TABLET, FILM COATED ORAL at 09:26

## 2019-12-02 RX ADMIN — PIPERACILLIN AND TAZOBACTAM 3.38 G: 3; .375 INJECTION, POWDER, FOR SOLUTION INTRAVENOUS at 22:04

## 2019-12-02 RX ADMIN — TBO-FILGRASTIM 480 MCG: 480 INJECTION, SOLUTION SUBCUTANEOUS at 22:04

## 2019-12-02 RX ADMIN — LORAZEPAM 1 MG: 1 TABLET ORAL at 22:04

## 2019-12-02 RX ADMIN — VORICONAZOLE 200 MG: 200 TABLET, FILM COATED ORAL at 22:04

## 2019-12-02 RX ADMIN — DRONABINOL 2.5 MG: 2.5 CAPSULE ORAL at 09:26

## 2019-12-02 RX ADMIN — CAMPHOR AND MENTHOL: 5; 5 LOTION TOPICAL at 09:39

## 2019-12-02 RX ADMIN — ACYCLOVIR 400 MG: 800 TABLET ORAL at 17:33

## 2019-12-02 RX ADMIN — ONDANSETRON 4 MG: 2 INJECTION INTRAMUSCULAR; INTRAVENOUS at 16:42

## 2019-12-02 RX ADMIN — DULOXETINE 30 MG: 30 CAPSULE, DELAYED RELEASE ORAL at 09:26

## 2019-12-02 NOTE — PROGRESS NOTES
[facility] Inpatient Hematology / Oncology Daily Progress Note Reason for Admission:  Admission for antineoplastic chemotherapy [Z51.11] 24 Hour Events: 
Tmax 101, VSS, on O2 @ 3L On Zosyn for BCx+ enterococcus/streptococcus Day 25 FLAG-VENITA Awaiting count recovery, on Granix  at bedside ROS: 
Constitutional: +fatigue +fever +malaise CV: Negative for chest pain, palpitations, edema. Respiratory: Negative for dyspnea, cough, wheezing. GI: Negative for nausea, abdominal pain. +Diarrhea. 10 point review of systems is otherwise negative with the exception of the elements mentioned above in the HPI. No Known Allergies Past Medical History:  
Diagnosis Date  AML (acute myeloid leukemia) ([facility] [address] dx- 4/2019  
 followed by dr Earnestine Healy  Depression  Hypercholesterolemia  Infection   
 of port -- was placed 5/2019-- removed 6/2019---right chest  
 Psychiatric disorder   
 aniexty  Sleep apnea Past Surgical History:  
Procedure Laterality Date  HX OTHER SURGICAL    
 colonoscopy  HX VASCULAR ACCESS    
 IR INSERT TUNL CVC W PORT OVER 5 YEARS  4/30/2019  IR INSERT TUNL CVC W PORT OVER 5 YEARS  7/15/2019  IR REMOVE TUNL CVAD W PORT/PUMP  6/13/2019 Family History Problem Relation Age of Onset  Cancer Father Social History Socioeconomic History  Marital status:  Spouse name: Not on file  Number of children: Not on file  Years of education: Not on file  Highest education level: Not on file Occupational History  Not on file Social Needs  Financial resource strain: Not on file  Food insecurity:  
  Worry: Not on file Inability: Not on file  Transportation needs:  
  Medical: Not on file Non-medical: Not on file Tobacco Use  Smoking status: Never Smoker  Smokeless tobacco: Never Used Substance and Sexual Activity  Alcohol use: Never Frequency: Never  Drug use: Never  Sexual activity: Not on file Lifestyle  Physical activity:  
  Days per week: Not on file Minutes per session: Not on file  Stress: Not on file Relationships  Social connections:  
  Talks on phone: Not on file Gets together: Not on file Attends Jewish service: Not on file Active member of club or organization: Not on file Attends meetings of clubs or organizations: Not on file Relationship status: Not on file  Intimate partner violence:  
  Fear of current or ex partner: Not on file Emotionally abused: Not on file Physically abused: Not on file Forced sexual activity: Not on file Other Topics Concern [address] Service Not Asked  Blood Transfusions Not Asked  Caffeine Concern Not Asked  Occupational Exposure Not Asked Vanessa Blizzard Hazards Not Asked  Sleep Concern Not Asked  Stress Concern Not Asked  Weight Concern Not Asked  Special Diet Not Asked  Back Care Not Asked  Exercise Not Asked  Bike Helmet Not Asked  Seat Belt Not Asked  Self-Exams Not Asked Social History Narrative  Not on file Current Facility-Administered Medications Medication Dose Route Frequency Provider Last Rate Last Dose  [START ON 12/3/2019] Vancomycin Trough Level Reminder   Other Norm MD Ravi      
 0.9% sodium chloride infusion 250 mL  250 mL IntraVENous PRN Adela Brush MD      
 vancomycin (VANCOCIN) 750 mg in  mL infusion  750 mg IntraVENous Q12H Adela Brush  mL/hr at 12/02/19 0936 750 mg at 12/02/19 7748  acetaminophen/diphenhydrAMINE (TYLENOL PM EXT STR) 500/25 mg (Patient Supplied)   Oral QHS PRN Romeo Sloan NP      
 dronabinol (MARINOL) capsule 2.5 mg  2.5 mg Oral DAILY Rosie Montoya NP   2.5 mg at 12/02/19 0114  temazepam (RESTORIL) capsule 15 mg  15 mg Oral QHS Drew Ricks NP   15 mg at 12/01/19 2208  camphor-menthol (SARNA) 0.5-0.5 % lotion   Topical TID Nataliia Sheffield NP      
 piperacillin-tazobactam (ZOSYN) 3.375 g in 0.9% sodium chloride (MBP/ADV) 100 mL  3.375 g IntraVENous Q8H Giovanna Ambriz MD 25 mL/hr at 12/02/19 0527 3.375 g at 12/02/19 0527  
 sodium chloride 0.9 % bolus infusion 500 mL  500 mL IntraVENous QHS Nataliia Sheffield NP   500 mL at 11/30/19 2205  baclofen (LIORESAL) tablet 5 mg  5 mg Oral Q8H PRN Clare Cook MD   5 mg at 11/21/19 1335  potassium chloride (K-DUR, KLOR-CON) SR tablet 20 mEq  20 mEq Oral BID Clare Cook MD   20 mEq at 12/02/19 0927  
 albuterol (PROVENTIL VENTOLIN) nebulizer solution 2.5 mg  2.5 mg Nebulization Q6H PRN Natalya Murray NP   2.5 mg at 11/21/19 1600  
 alum-mag hydroxide-simeth (MYLANTA) oral suspension 30 mL  30 mL Oral Q4H PRN Clare Cook MD   30 mL at 11/30/19 8758  loperamide (IMODIUM) capsule 2 mg  2 mg Oral Q4H PRN Clare Cook MD   2 mg at 12/01/19 2104  loratadine (CLARITIN) tablet 10 mg  10 mg Oral DAILY Jessica Leach NP   10 mg at 12/02/19 3690  
 central line flush (saline) syringe 10 mL  10 mL InterCATHeter PRN Clare Cook MD   10 mL at 11/28/19 2109  
 docusate sodium (COLACE) capsule 100 mg  100 mg Oral BID PRN Azra Rand NP   100 mg at 11/11/19 1308  LORazepam (ATIVAN) injection 0.5 mg  0.5 mg IntraVENous Q6H PRN Clare Cook MD   0.5 mg at 11/27/19 1201  
 saline peripheral flush soln 10 mL  10 mL InterCATHeter PRN Clare Cook MD   10 mL at 11/23/19 2229  
 heparin (porcine) pf 300-500 Units  300-500 Units InterCATHeter PRN Clare Cook MD   300 Units at 12/01/19 0245  tbo-filgrastim (GRANIX) injection 480 mcg  480 mcg SubCUTAneous QHS Clare Cook MD   480 mcg at 12/01/19 2232  
 acyclovir (ZOVIRAX) tablet 400 mg  400 mg Oral BID Azra Rand NP   400 mg at 12/02/19 1648  allopurinol (ZYLOPRIM) tablet 300 mg  300 mg Oral DAILY Azra Rand NP   300 mg at 12/02/19 0909  cholecalciferol (VITAMIN D3) (400 Units /10 mcg) tablet 2 Tab  800 Units Oral DAILY Vera Royalty, NP   2 Tab at 19 3014  DULoxetine (CYMBALTA) capsule 30 mg  30 mg Oral DAILY Vera Royalty, NP   30 mg at 19 6763  lidocaine-prilocaine (EMLA) 2.5-2.5 % cream   Topical PRN Vera Royalty, NP      
 LORazepam (ATIVAN) tablet 1 mg  1 mg Oral QHS Vera Royalty, NP   1 mg at 19 2232  pravastatin (PRAVACHOL) tablet 10 mg  10 mg Oral QHS Vera Royalty, NP   10 mg at 19 2232  voriconazole (VFEND) tablet 200 mg  200 mg Oral Q12H Vera Royalty, NP   200 mg at 19 0926  
 ondansetron (ZOFRAN) injection 4 mg  4 mg IntraVENous Q4H PRN Vera Royalty, NP   4 mg at 19 9603  prochlorperazine (COMPAZINE) with saline injection 5 mg  5 mg IntraVENous Q6H PRN Vera Royalty, NP   5 mg at 19 1632  
 HYDROcodone-acetaminophen (NORCO) 5-325 mg per tablet 1 Tab  1 Tab Oral Q6H PRN Vera Royalty, NP   1 Tab at 19 8773  morphine injection 2 mg  2 mg IntraVENous Q4H PRN Vera Royalty, NP      
 acetaminophen (TYLENOL) tablet 650 mg  650 mg Oral Q6H PRN Loyd Read MD   650 mg at 19 1418  diphenhydrAMINE (BENADRYL) capsule 25 mg  25 mg Oral Q6H PRN Loyd Read MD   25 mg at 19 1418  vit C,E-Rm-qpigi-lutein-zeaxan (PRESERVISION AREDS-2) capsule 1 Cap (Patient Supplied)  1 Cap Oral DAILY Loyd Read MD   1 Cap at 19 7682 OBJECTIVE: 
Patient Vitals for the past 8 hrs: 
 BP Temp Pulse Resp SpO2  
19 1026 115/53 98.8 °F (37.1 °C) (!) 109 18 91 % 19 0716 151/57 99.9 °F (37.7 °C) 99 18 91 % 19 0325 146/68 99.5 °F (37.5 °C) 97 18 95 % Temp (24hrs), Av.5 °F (37.5 °C), Min:98.1 °F (36.7 °C), Max:101 °F (38.3 °C) 
 
701 -  1900 In: 240 [P.O.:240] Out: - Physical Exam: 
Constitutional: Well developed, frail appearing female in no acute distress, sitting in bed HEENT: Normocephalic and atraumatic. Oropharynx is clear, mucous membranes are moist. Extraocular muscles are intact. Sclerae anicteric. Skin Warm and dry. Scattered rash. No erythema. No pallor. Bruising noted on BUE/R elbow and abdomen. Respiratory Small crackles at bases,  normal air exchange without accessory muscle use. CVS Normal rate, regular rhythm and normal S1 and S2. No murmurs, gallops, or rubs. Abdomen Soft, mildly tender across lower abd-improving, nondistended, normoactive bowel sounds. Neuro Grossly nonfocal with no obvious sensory or motor deficits. MSK Normal range of motion in general.  No edema and no tenderness. Psych Appropriate mood and affect. Labs:   
Recent Results (from the past 24 hour(s)) CULTURE, BLOOD Collection Time: 12/01/19  8:03 PM  
Result Value Ref Range Special Requests: MEDIAL PORT Culture result: NO GROWTH AFTER 11 HOURS    
CULTURE, BLOOD Collection Time: 12/01/19  8:43 PM  
Result Value Ref Range Special Requests: RIGHT Antecubital 
    
 Culture result: NO GROWTH AFTER 11 HOURS    
CULTURE, URINE Collection Time: 12/01/19  8:58 PM  
Result Value Ref Range Special Requests: NO SPECIAL REQUESTS Culture result:     
  NO GROWTH AFTER SHORT PERIOD OF INCUBATION. FURTHER RESULTS TO FOLLOW AFTER OVERNIGHT INCUBATION. URINALYSIS W/ RFLX MICROSCOPIC Collection Time: 12/01/19  8:58 PM  
Result Value Ref Range Color YELLOW Appearance CLEAR Specific gravity 1.016 1.001 - 1.023    
 pH (UA) 7.0 5.0 - 9.0 Protein 30 (A) NEG mg/dL Glucose NEGATIVE  mg/dL Ketone NEGATIVE  NEG mg/dL Bilirubin NEGATIVE  NEG Blood NEGATIVE  NEG Urobilinogen 0.2 0.2 - 1.0 EU/dL Nitrites NEGATIVE  NEG Leukocyte Esterase NEGATIVE  NEG    
 WBC 3-5 0 /hpf  
 RBC 5-10 0 /hpf Epithelial cells 5-10 0 /hpf Bacteria 0 0 /hpf Casts 3-5 0 /lpf METABOLIC PANEL, COMPREHENSIVE  
 Collection Time: 12/02/19  3:04 AM  
Result Value Ref Range Sodium 142 136 - 145 mmol/L Potassium 3.8 3.5 - 5.1 mmol/L Chloride 110 (H) 98 - 107 mmol/L  
 CO2 26 21 - 32 mmol/L Anion gap 6 (L) 7 - 16 mmol/L Glucose 93 65 - 100 mg/dL BUN 11 8 - 23 MG/DL Creatinine 0.54 (L) 0.6 - 1.0 MG/DL  
 GFR est AA >60 >60 ml/min/1.73m2 GFR est non-AA >60 >60 ml/min/1.73m2 Calcium 7.8 (L) 8.3 - 10.4 MG/DL Bilirubin, total 0.3 0.2 - 1.1 MG/DL  
 ALT (SGPT) 30 12 - 65 U/L  
 AST (SGOT) 18 15 - 37 U/L Alk. phosphatase 78 50 - 136 U/L Protein, total 5.8 (L) 6.3 - 8.2 g/dL Albumin 1.9 (L) 3.2 - 4.6 g/dL Globulin 3.9 (H) 2.3 - 3.5 g/dL A-G Ratio 0.5 (L) 1.2 - 3.5 MAGNESIUM Collection Time: 12/02/19  3:04 AM  
Result Value Ref Range Magnesium 1.9 1.8 - 2.4 mg/dL LD Collection Time: 12/02/19  3:04 AM  
Result Value Ref Range  110 - 210 U/L  
URIC ACID Collection Time: 12/02/19  3:04 AM  
Result Value Ref Range Uric acid 1.1 (L) 2.6 - 6.0 MG/DL  
PHOSPHORUS Collection Time: 12/02/19  3:04 AM  
Result Value Ref Range Phosphorus 2.4 2.3 - 3.7 MG/DL  
CBC WITH AUTOMATED DIFF Collection Time: 12/02/19  3:04 AM  
Result Value Ref Range WBC 0.0 (LL) 4.3 - 11.1 K/uL  
 RBC 2.35 (L) 4.05 - 5.2 M/uL HGB 7.1 (L) 11.7 - 15.4 g/dL HCT 21.3 (L) 35.8 - 46.3 % MCV 90.6 79.6 - 97.8 FL  
 MCH 30.2 26.1 - 32.9 PG  
 MCHC 33.3 31.4 - 35.0 g/dL  
 RDW 12.9 11.9 - 14.6 % PLATELET 32 (L) 178 - 450 K/uL MPV 9.7 9.4 - 12.3 FL ABSOLUTE NRBC 0.00 0.0 - 0.2 K/uL  
 DF AUTOMATED NEUTROPHILS 50 43 - 78 % LYMPHOCYTES 50 (H) 13 - 44 % MONOCYTES 0 (L) 4.0 - 12.0 % EOSINOPHILS 0 (L) 0.5 - 7.8 % BASOPHILS 0 0.0 - 2.0 % IMMATURE GRANULOCYTES 0 0.0 - 5.0 %  
 ABS. NEUTROPHILS 0.0 (L) 1.7 - 8.2 K/UL  
 ABS. LYMPHOCYTES 0.0 (L) 0.5 - 4.6 K/UL  
 ABS. MONOCYTES 0.0 (L) 0.1 - 1.3 K/UL  
 ABS. EOSINOPHILS 0.0 0.0 - 0.8 K/UL ABS. BASOPHILS 0.0 0.0 - 0.2 K/UL  
 ABS. IMM. GRANS. 0.0 0.0 - 0.5 K/UL Imaging: 
CT HEAD WO CONT [[id number]] Collected: 11/19/19 1050 Order Status: Completed Updated: 11/19/19 1054 Narrative:    
CT HEAD WITHOUT CONTRAST, 11/19/2019 History: Fall last night hitting left side of head Comparison: . Technique:   5 mm axial scans from the skull base to the vertex. All CT scans 
performed at this facility use one or all of the following: Automated exposure 
control, adjustment of the mA and/or kVp according to patient's size, iterative 
reconstruction. Findings:  No evidence of intracranial hemorrhage is seen. Faint bilateral basal 
ganglia calcifications are seen. No abnormal extra-axial fluid collections are 
seen.  Mild cortical involutional changes are seen which are not felt to be 
abnormal given the patient's age. Margarito Raring evidence for acute hydrocephalus is seen. No evidence of midline shift or herniation is seen.  No abnormal edema pattern 
is seen in a vascular distribution to suggest large artery infarction. Evaluation with bone windows shows no acute osseous changes.  The visualized 
sinuses, middle ears, and mastoid air cells are well aerated. Impression:    
IMPRESSION:   
1.  No acute intracranial process evident by noncontrast CT study of the head. XR CHEST SNGL V [[id number]] Collected: 11/18/19 2041 Order Status: Completed Updated: 11/18/19 2044 Narrative:    
EXAM: XR CHEST SNGL V 
 
INDICATION: neutropenic fever COMPARISON: 9/29/2019 FINDINGS: A portable AP radiograph of the chest was obtained at 1946 hours. The 
patient is on a cardiac monitor. The lungs are clear. The cardiac and 
mediastinal contours and pulmonary vascularity are normal.  The bones and soft 
tissues are grossly within normal limits. Impression:    
IMPRESSION: Normal chest.  
XR ELBOW RT MIN 3 V [[id number]] Collected: 11/10/19 1421 Order Status: Completed Updated: 11/10/19 1424 Narrative:    
Right elbow Clinical location: Elbow pain after feeling a pop, decreased range of motion FINDINGS: Four views of the right elbow show no fracture or dislocation. There 
is no joint effusion. The soft tissues are unremarkable. Impression:    
IMPRESSION: No acute osseous abnormality or joint derangement of the right 
elbow. ASSESSMENT: 
Problem List  Date Reviewed: 10/31/2019 Codes Class Noted Febrile neutropenia (HCC) ICD-10-CM: D70.9, R50.81 ICD-9-CM: 288.00, 780.61  9/21/2019 Pancytopenia due to antineoplastic chemotherapy [facility]) ICD-10-CM: D61.810, T45.1X5A 
ICD-9-CM: 284.11, E933.1  6/12/2019 Cellulitis of neck ICD-10-CM: Y11.616 ICD-9-CM: 682.1  6/12/2019 Immunocompromised status associated with infection ICD-10-CM: B99.9 ICD-9-CM: 136.9  6/12/2019 Port or reservoir infection ICD-10-CM: L36.559S ICD-9-CM: 999.33  6/12/2019 Acute myeloid leukemia not having achieved remission [facility] ICD-10-CM: C92.00 ICD-9-CM: 205.00  5/9/2019 Admission for antineoplastic chemotherapy ICD-10-CM: Z51.11 ICD-9-CM: V58.11  5/5/2019 AML (acute myeloblastic leukemia) ([facility] ICD-10-CM: C92.00 ICD-9-CM: 205.00  4/28/2019 Weakness generalized ICD-10-CM: R53.1 ICD-9-CM: 780.79  4/28/2019 Pancytopenia ([facility] [address] ICD-10-CM: Y11.066 ICD-9-CM: 284.19  4/28/2019 Thrombocytopenia ([facility] ICD-10-CM: D69.6 ICD-9-CM: 287.5  4/27/2019 Ms. Clarice Orantes is a 68 y.o. female admitted on 11/7/2019. She is a known patient of Dr. 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 She failed induction with 7+3/Midostaurin. On Dacogen/Gilteritinib with recent BMbx with persistent residual disease. She is being admitted for FLAG-VENITA. She is feeling well and is ready to proceed with treatment. PLAN: 
AML 
- admit for salvage FLAG-VENITA 
- needs Echo prior - ordered 11/8 Day 1 FLAG-VENITA. Echo with EF 55-60%, proceed with tx. 11/9 Day 2 FLAG-VENITA. Tolerating well, no issues. Uric acid 3.1 today. 11/10 Day 3 FLAG-VENITA. No issues with treatment. Uric acid 3.3 today. 11/12 Day 5 FLAG-VENITA. Tolerating treatment well. G-CSF to start tomorrow. 11/13 Day 6 FLAG-VENITA, doing well, Granix/claritin starts today 12/2 Day 25 FLAG VENITA. Awaiting count recovery, con't Granix. Pancytopenia secondary to disease - Transfuse prn per [facility] SOPs R elbow pain 11/11 c/o R elbow pain with limited ROM yesterday. Xray neg. Pain improved today, noted bruising. Normal ROM on exam. 
 
Mild Abd discomfort/loose stools 11/13 discomfort is improved from yesterday, will monitor 11/14 loose stools, but not watery, can use Imodium prn 
11/15 Imodium working well  
11/16 controlled 11/26 Ova and parasite 11/22 pending. Check for C diff if stool appropriate. Continue anti diarrheal for now Neutropenic fever / Sepsis not present on admission / UTI / Strep/Enterococcus bacteremia 11/19 Tmax 102. 1.  UA +UTI. UCx pending. BCx-NGTD. CXR neg. On Cef/Vanc. DC prophylactic LVQ. 11/20 Tmax 102. BCx positive for streptococcus/enterococcus. Subculture in progress. On Cef/Vanc.   
11/22 repeat BC NTD. Alpha strep on vanc. 11/24 Repeat BC NTD. No fever. 11/25 new skin rash. ?RT to vanc. Consult ID for recommendations. 11/26 ID dc'd cefe and vanc. Started zosyn. TTE ordered. 12/2 Tmax 101. CXR neg.  UA neg. Repeat cultures. On Zosyn, restarted Vanc last PM. 
 
Fall 
11/19 s/p last PM.  No LOC. Hit head. CT head neg. Double vision 11/21 Neuro has seen patient. Concerns for possible 6th nerve palsy. Recommends LP. We will hold with WBC 0.0 and continue to monitor. 11/22 vision improved today. MRI brain pending. 11/23 MRI unremarkable. Discussed with Neuro. No need for LP 
11/27 Resolved Continue home meds Prophylactic Antibx: Acyclovir, Voriconazole [facility] SOPs SCDs for DVT prophylaxis (AC contraindicated d/t thrombocytopenia) Goals and plan of care reviewed with the patient. All questions answered to the best of our ability. Disposition: Day 25 FLAG-VENITA. Awaiting count recovery. Transfusions per mily SOPs. Encouraged to get out of bed to the chair or to walk the halls. Will need BMbx prior to discharge but not prior to day 28 chemo. Upon discharge will need twice weekly labs w transfusions as needed. Weekly provider visits. RTC within 1 week from discharge. Encourage use of IS. Omaira Ching NP [facility]ematology [facility] On[facility] [address] [place] [address] Office : [phone] Fax : [phone]

## 2019-12-02 NOTE — PROGRESS NOTES
Infectious Disease Progress Note Today's Date: 2019 Admit Date: 2019 Impression: · E faecalis bacteremia/Alpha strep (); repeat blood cx () NG ; TTE (-); source Port vs GI 
· AML; admitted for salvage FLAG-VENITA · Febrile neutropenia · Pancytopenia · R elbow pain resolved · S/P fall ; CT head (-) · Double vision; Neuro has seen; possible 6th nerve palsy; symptoms improved now · Rash on hands, resolved · Diarrhea; chronic Plan:  
· Continue zosyn, bacteremia treatment EOT 12/3/19 · Continue vancomycin for now and follow pending cultures and fever curve. Anti-infectives: · Ayah Deacon · ACV 
· IV Vancomycin (-) · IV Cefepime (-) · PO LVQ (-) · IV Zosyn (- Subjective: Interval History: Fever yesterday and oncology restarted vancomycin. Repeat blood cultures pending. CXR negative. Reports nausea, denies vomiting, diarrhea, chills or sweats, denies shortness of breath. Complains of some swelling. No Known Allergies Review of Systems:  A comprehensive review of systems was negative except for that written in the History of Present Illness. Objective:  
 
Visit Vitals /53 (BP 1 Location: Right arm) Pulse (!) 109 Temp 98.8 °F (37.1 °C) Resp 18 Ht 5' 6\" (1.676 m) Wt 84.7 kg (186 lb 12.8 oz) SpO2 91% BMI 30.15 kg/m² Temp (24hrs), Av.7 °F (37.6 °C), Min:98.4 °F (36.9 °C), Max:101 °F (38.3 °C) Lines:  Left chest port:    
 
Physical Exam:   
General:  Alert, cooperative, no acute distress, appears stated age, appears chronically ill, alopecia Eyes:  Anicteric sclerae, no drainage Mouth/Throat: Mucous membranes normal, oral pharynx clear Neck: Symmetric, supple, no JVD Lungs:   Clear without increased work of breathing, no audible wheezes CV:  RRR without audible murmur Abdomen:   Soft, non tender, active bowel sounds Extremities: Trace bilateral LE edema Skin: No lesions, healing rash bilateral hands Lymph nodes: Cervical and supraclavicular without palpable nodes Musculoskeletal: No swelling or deformity Lines/Devices:  Intact, no erythema, drainage or tenderness Psych: Alert and oriented, appropriate Data Review: CBC: 
Recent Labs 12/02/19 0304 12/01/19 0246 11/30/19 
4571 WBC 0.0* 0.0* 0.0* GRANS 50  --   -- MONOS 0*  --   --   
EOS 0*  --   --   
ANEU 0.0*  --   --   
ABL 0.0*  --   --   
HGB 7.1* 7.6* 7.5* HCT 21.3* 22.2* 22.9*  
PLT 32* 10* 21* BMP: 
Recent Labs 12/02/19 0304 12/01/19 0246 11/30/19 
0861 CREA 0.54* 0.55* 0.60 BUN 11 12 16  140 142  
K 3.8 4.0 4.3 * 108* 109* CO2 26 26 27 AGAP 6* 6* 6*  
GLU 93 98 101* LFTS: 
Recent Labs 12/02/19 0304 12/01/19 0246 11/30/19 
0827 TBILI 0.3 0.4 0.3 ALT 30 26 29 SGOT 18 14* 19  
AP 78 74 75  
TP 5.8* 6.0* 6.0* ALB 1.9* 1.9* 1.9* Microbiology:  
 
All Micro Results Procedure Component Value Units Date/Time CULTURE, BLOOD [[id number]] Collected:  12/01/19 2043 Order Status:  Completed Specimen:  Blood Updated:  12/02/19 0825 Special Requests: --     
  RIGHT Antecubital 
  
  Culture result: NO GROWTH AFTER 11 HOURS     
 CULTURE, BLOOD [[id number]] Collected:  12/01/19 2003 Order Status:  Completed Specimen:  Blood Updated:  12/02/19 0825 Special Requests: MEDIAL PORT Culture result: NO GROWTH AFTER 11 HOURS     
 CULTURE, URINE [[id number]] Collected:  12/01/19 2058 Order Status:  Completed Specimen:  Urine from Clean catch Updated:  12/02/19 0740 Special Requests: NO SPECIAL REQUESTS Culture result:    
  NO GROWTH AFTER SHORT PERIOD OF INCUBATION. FURTHER RESULTS TO FOLLOW AFTER OVERNIGHT INCUBATION. [address] [place], [place] [address]Jaye [[id number]] Collected:  11/22/19 5950 Order Status:  Completed Specimen:  Stool Updated:  11/27/19 1236 Source STOOL Ova & Parasite exam Final Report Below Comment: (NOTE) These results were obtained using wet preparation(s) and trichrome 
stained smear. This test does not include testing for Cryptosporidium parvum, Cyclospora, or Microsporidia. Performed At: [address], [address] [place] [address] Benjamín Uribe MD UN:[id number] C. DIFFICILE AG & TOXIN A/B [[id number]] Collected:  11/27/19 0525 Order Status:  Completed Specimen:  Stool Updated:  11/27/19 1233 [id number] Elena Sublette ANTIGEN    
  C. DIFFICILE GDH ANTIGEN-NEGATIVE  
     
  C. difficile toxin C. DIFFICILE TOXIN-NEGATIVE  
     
  PCR Reflex NOT APPLICABLE INTERPRETATION    
  NEGATIVE FOR TOXIGENIC C. DIFFICILE Clinical Consideration NEGATIVE FOR TOXIGENIC C. DIFFICILE  
     
 CULTURE, BLOOD [[id number]] Collected:  11/21/19 0106 Order Status:  Completed Specimen:  Blood Updated:  11/26/19 7181 Special Requests: --     
  RIGHT 
HAND Culture result: NO GROWTH 5 DAYS     
 CULTURE, BLOOD [[id number]] Collected:  11/20/19 2028 Order Status:  Completed Specimen:  Blood Updated:  11/25/19 6267 Special Requests: PORT Culture result: NO GROWTH 5 DAYS     
 CULTURE, STOOL [[id number]] Collected:  11/22/19 2585 Order Status:  Completed Specimen:  Stool Updated:  11/24/19 8965 Special Requests: NO SPECIAL REQUESTS Culture result:    
  No Salmonella, Shigella, or Ecoli 0157 isolated. NO GROWTH OF GRAM NEGATIVE FECAL REGGIE,  
     
 CULTURE, BLOOD [[id number]] Collected:  11/18/19 2032 Order Status:  Completed Specimen:  Blood Updated:  11/23/19 4973 Special Requests: --     
  RIGHT 
HAND Culture result: NO GROWTH 5 DAYS     
 CULTURE, BLOOD [[id number]]  (Abnormal)  (Susceptibility) Collected:  11/18/19 1955 Order Status:  Completed Specimen:  Blood Updated:  11/22/19 0730   Special Requests: LATERAL PORT     
 GRAM STAIN GRAM POS COCCI IN CHAINS AEROBIC AND ANAEROBIC BOTTLES RESULTS VERIFIED, PHONED TO AND READ BACK BY EJ CORREA RN @ 6406 ON 11/19/2019 BY AMM Culture result:    
  ENTEROCOCCUS FAECALIS GROUP D ALPHA STREPTOCOCCUS, NOT S. PNEUMONIAE THIS ORGANISM MAY BE INDICATIVE OF CULTURE CONTAMINATION, HOWEVER, CLINICAL CORRELATION NEEDS TO BE EVALUATED, AS EACH CASE IS UNIQUE. REFER TO Christopher Muller Dr PANEL [id number] CULTURE, URINE [[id number]] Collected:  11/19/19 0102 Order Status:  Completed Specimen:  Urine from Clean catch Updated:  11/21/19 0710 Special Requests: NO SPECIAL REQUESTS Culture result: NO GROWTH 2 DAYS     
 BLOOD CULTURE ID PANEL [[id number]]  (Abnormal) Collected:  11/18/19 1955 Order Status:  Completed Specimen:  Blood Updated:  11/19/19 1420 Acc. no. from [employer] [id number] Enterococcus DETECTED Streptococcus DETECTED Comment: RESULTS VERIFIED, PHONED TO AND READ BACK BY 
EJ CORREA RN @ 8232 ON 11/19/2019 BY [facility] 
  
  
  Ananth Gray A/ZARIA (Vancomycin Resistance Gene) NOT DETECTED Clinical Consideration Multiple organisms detected. Results do not replace susceptibility testing. Maral Dale [[id number]] Collected:  11/19/19 0415 Order Status:  Canceled Specimen:  Stool Imaging: CXR 12/1/19 IMPRESSION: No acute cardiopulmonary abnormality. No infiltrate appreciated. Signed By: Deejay Arroyo NP December 2, 2019

## 2019-12-02 NOTE — PROGRESS NOTES
Nutrition follow-up Reason for initial assessment: Length of Stay  
  
Assessment:  
Food/Nutrition Patient History:  Patient with PMH of AML and hypercholesterolemia admitted for chemo. She is day 25 FLAG-VENITA. On Granix, awaiting count recovery. Patient seen with spouse at bedside. She states that poor appetite persists and she is having more  difficulty trying to eat. She states that she continues to tolerate yogurt and Ensure. She states that if nothing else she is eating yogurt and drinking Ensure TID. Her  brought her a milkshake from  again over the weekend and she drank it all.  
  
DIET REGULAR   
DIET Nutrition Supplements Ensure Enlive with breakfast, lunch, dinner 
  
Anthropometrics:Height: 5' 6\" (167.6 cm),  Weight: 87.1 kg (192 lb), Weight Source: Standing scale (comment), Body mass index is 30.99 kg/m². BMI class of obese Macronutrient needs: 87.1 kg Listed body weight BXB:  3596-0107 VJFF /day (18-20 kcal/kg) CRQ:  73-45 FJIQO protein/day (20% kcal) 
  Intake/Comparative Standards: Recorded meal(s): 60% of 11 recorded meals since last RD assessment. This potentially meets ~85% of kcal and ~70% of protein needs. Only tolerating yogurt and Ensure Enlive today. Likely that patient is consuming less than calculated needs based on foods ordered. Ensure Enlive TID providing 1050 kcal and 60 g pro (~65% EEN and ~75% EPN). 
  
Nutrition Diagnosis:  
Inadequate oral intake related to poor appetite as evidenced by patient reported barrier to PO intake, decline in intake meeting ~85% estimated energy needs and ~70% of estimated protein needs. 
  
Intervention: 
Meals and snacks: Continue current diet.  
Nutrition Supplement Therapy: Continue Ensure Enlive TID Discharge Nutrition Plan: Too soon to determine.  
 
[facility] [place], [address], [address]

## 2019-12-02 NOTE — PROGRESS NOTES
Pharmacokinetic Consult to Pharmacist 
 
Carlos Israel is a 68 y.o. female being treated for febrile neutropenia with vancomycin. Height: 5' 6\" (167.6 cm)  Weight: 84.7 kg (186 lb 12.8 oz) Lab Results Component Value Date/Time BUN 12 12/01/2019 02:46 AM  
 Creatinine 0.55 (L) 12/01/2019 02:46 AM  
 WBC 0.0 (LL) 12/01/2019 02:46 AM  
 Procalcitonin 0.1 09/21/2019 06:50 PM  
 Lactic Acid (POC) 0.67 09/21/2019 08:51 PM  
  
Estimated Creatinine Clearance: 78.5 mL/min (A) (by C-G formula based on SCr of 0.55 mg/dL (L)). CULTURES: 
Results Procedure Component Value Units Date/Time CULTURE, URINE [[id number]] Collected:  12/01/19 2058 Order Status:  Completed Specimen:  Urine from Clean catch Updated:  12/01/19 2156 CULTURE, BLOOD [[id number]] Collected:  12/01/19 2043 Order Status:  Completed Specimen:  Blood Updated:  12/01/19 2121 CULTURE, BLOOD [[id number]] Collected:  12/01/19 2003 Order Status:  Completed Specimen:  Blood Updated:  12/01/19 2032 C. DIFFICILE AG & TOXIN A/B [[id number]] Collected:  11/27/19 0525 Order Status:  Completed Specimen:  Stool Updated:  11/27/19 1233 7007 Elena Douglas ANTIGEN    
  C. DIFFICILE GDH ANTIGEN-NEGATIVE  
     
  C. difficile toxin C. DIFFICILE TOXIN-NEGATIVE  
     
  PCR Reflex NOT APPLICABLE INTERPRETATION    
  NEGATIVE FOR TOXIGENIC C. DIFFICILE Clinical Consideration NEGATIVE FOR TOXIGENIC C. DIFFICILE  
     
 OVA & Sophronia Radhake [[id number]] Collected:  11/22/19 5396 Order Status:  Completed Specimen:  Stool Updated:  11/27/19 1236 Source STOOL Ova & Parasite exam Final Report Below Comment: (NOTE) These results were obtained using wet preparation(s) and trichrome 
stained smear. This test does not include testing for Cryptosporidium parvum, Cyclospora, or Microsporidia. Performed At: [address], [address] [place] [address] Dioni Lester MD IB:[id number] Dustin Cortez [[id number]] Collected:  11/22/19 4898 Order Status:  Completed Specimen:  Stool Updated:  11/24/19 5029 Special Requests: NO SPECIAL REQUESTS Culture result:    
  No Salmonella, Shigella, or Ecoli 0157 isolated. NO GROWTH OF GRAM NEGATIVE FECAL REGGIE,  
     
 CULTURE, BLOOD [[id number]] Collected:  11/21/19 0106 Order Status:  Completed Specimen:  Blood Updated:  11/26/19 3981 Special Requests: --     
  RIGHT 
HAND Culture result: NO GROWTH 5 DAYS     
 CULTURE, BLOOD [[id number]] Collected:  11/20/19 2028 Order Status:  Completed Specimen:  Blood Updated:  11/25/19 3288 Special Requests: PORT Culture result: NO GROWTH 5 DAYS     
 CULTURE, STOOL [[id number]] Collected:  11/19/19 0415 Order Status:  Canceled Specimen:  Stool CULTURE, URINE [[id number]] Collected:  11/19/19 0102 Order Status:  Completed Specimen:  Urine from Clean catch Updated:  11/21/19 0710 Special Requests: NO SPECIAL REQUESTS Culture result: NO GROWTH 2 DAYS     
 CULTURE, BLOOD [[id number]] Collected:  11/18/19 2032 Order Status:  Completed Specimen:  Blood Updated:  11/23/19 7377 Special Requests: --     
  RIGHT 
HAND Culture result: NO GROWTH 5 DAYS     
 CULTURE, BLOOD [[id number]]  (Abnormal)  (Susceptibility) Collected:  11/18/19 1955 Order Status:  Completed Specimen:  Blood Updated:  11/22/19 0730 Special Requests: LATERAL PORT     
  GRAM STAIN GRAM POS COCCI IN CHAINS AEROBIC AND ANAEROBIC BOTTLES RESULTS VERIFIED, PHONED TO AND READ BACK BY EJ CORREA RN @ 6354 ON 11/19/2019 BY AMM Culture result:    
  ENTEROCOCCUS FAECALIS GROUP D ALPHA STREPTOCOCCUS, NOT S. PNEUMONIAE THIS ORGANISM MAY BE INDICATIVE OF CULTURE CONTAMINATION, HOWEVER, CLINICAL CORRELATION NEEDS TO BE EVALUATED, AS EACH CASE IS UNIQUE. REFER TO Christopher ARNETT [id number] Susceptibility Enterococcus  faecalis group D  
  CARINE Ampicillin ($) Susceptible Gentamicin Synergy S Resistant Penicillin G ($$) Susceptible Streptomycin Synergy Susceptible Vancomycin ($) Susceptible BLOOD CULTURE ID PANEL [[id number]]  (Abnormal) Collected:  11/18/19 1955 Order Status:  Completed Specimen:  Blood Updated:  11/19/19 1420 Acc. no. from [email or web address] [id number] Enterococcus DETECTED Streptococcus DETECTED Comment: RESULTS VERIFIED, PHONED TO AND READ BACK BY 
EJ CORREA RN @ 1680 ON 11/19/2019 BY [facility] ALMODOVAR/ZARIA (Vancomycin Resistance Gene) NOT DETECTED Clinical Consideration Multiple organisms detected. Results do not replace susceptibility testing. Day 1 of vancomycin. Goal trough is 15-20. Vancomycin dose initiated at 2,000 mg load, followed by 750 mg q12h. Will continue to follow patient. Thank you, Tahira Hand, PharmD

## 2019-12-02 NOTE — PROGRESS NOTES
Chart screened by  for discharge planning. Pt is day 25 of count recovery. No needs identified at this time. Please consult  if any new issues arise. Case management will continue to follow.

## 2019-12-02 NOTE — PROGRESS NOTES
EOS: T max 101 resolved without Tylenol. Was on room air, now requiring 2-3L NC to keep sats >90%. Obtained blood cultures from medial port and peripheral, U/A C&S and PCXR. Pt refuses 500ml NS bolus qhs. States she would not refuse if it were administered qam. Three small episodes of soft, loose stool. Given Imodium one time. Gait steady. Independent with ADL's. Await count recovery; on Granix. Report to oncoming RN. Continue with POC.

## 2019-12-02 NOTE — PROGRESS NOTES
END OF SHIFT NOTE: 
 
Intake/Output 12/02 0701 - 12/02 1900 In: 720 [P.O.:720] Out: 900 [Urine:900] Voiding: Yes Catheter:  No  
Drain:   
 
 
 
 
 
Stool:   One  occurrences. Stool Assessment Stool Color: Amyerick Roberto Carlos (12/02/19 1147) Stool Appearance: Loose(pudding consistency mixed with little urine ) (12/02/19 1147) Stool Amount: Medium (12/02/19 1147) Stool Source/Status: Rectum (12/02/19 1147) Emesis:   No  occurrences. VITAL SIGNS Patient Vitals for the past 12 hrs: 
 Temp Pulse Resp BP SpO2  
12/02/19 1416 98.8 °F (37.1 °C) 97  121/54 94 % 12/02/19 1026 98.8 °F (37.1 °C) (!) 109 18 115/53 91 % 12/02/19 0716 99.9 °F (37.7 °C) 99 18 151/57 91 % Pain Assessment Pain 1 Pain Scale 1: Numeric (0 - 10) (12/02/19 1433) Pain Intensity 1: 0 (12/02/19 1433) Patient Stated Pain Goal: 0 (12/01/19 0610) Pain Reassessment 1: Patient resting w/respiratory rate greater than 10 (12/01/19 0340) Pain Onset 1: Upon awakening (12/01/19 0242) Pain Location 1: Head (12/01/19 0242) Pain Orientation 1: Lower (12/01/19 0242) Pain Description 1: Aching (12/01/19 0242) Pain Intervention(s) 1: Medication (see MAR) (12/01/19 0242) Ambulating Yes in room Additional Information:  Patient highest temperature 99.9 oral, medicated with Zofran 4 mg  Twice this shift for nausea, no emesis. Patient is receiving Zosyn and Vancomycin IV as directed. Nasal Cannula  @ 2 liters. Patient medicated with Imodium once for loose stool, brown pudding consistency mixed with a little  urine. Shift report given to oncoming nurse  Jacki Harrell RN at the bedside.  
 
Viraj Johnson RN

## 2019-12-03 LAB
ALBUMIN SERPL-MCNC: 2 G/DL (ref 3.2–4.6)
ALBUMIN/GLOB SERPL: 0.5 {RATIO} (ref 1.2–3.5)
ALP SERPL-CCNC: 79 U/L (ref 50–136)
ALT SERPL-CCNC: 26 U/L (ref 12–65)
ANION GAP SERPL CALC-SCNC: 6 MMOL/L (ref 7–16)
AST SERPL-CCNC: 17 U/L (ref 15–37)
BILIRUB SERPL-MCNC: 0.4 MG/DL (ref 0.2–1.1)
BUN SERPL-MCNC: 10 MG/DL (ref 8–23)
CALCIUM SERPL-MCNC: 8.3 MG/DL (ref 8.3–10.4)
CHLORIDE SERPL-SCNC: 109 MMOL/L (ref 98–107)
CO2 SERPL-SCNC: 26 MMOL/L (ref 21–32)
CREAT SERPL-MCNC: 0.54 MG/DL (ref 0.6–1)
DIFFERENTIAL METHOD BLD: ABNORMAL
ERYTHROCYTE [DISTWIDTH] IN BLOOD BY AUTOMATED COUNT: 12.9 % (ref 11.9–14.6)
GLOBULIN SER CALC-MCNC: 4 G/DL (ref 2.3–3.5)
GLUCOSE SERPL-MCNC: 99 MG/DL (ref 65–100)
HCT VFR BLD AUTO: 21 % (ref 35.8–46.3)
HGB BLD-MCNC: 7 G/DL (ref 11.7–15.4)
LDH SERPL L TO P-CCNC: 166 U/L (ref 110–210)
MAGNESIUM SERPL-MCNC: 1.8 MG/DL (ref 1.8–2.4)
MCH RBC QN AUTO: 30 PG (ref 26.1–32.9)
MCHC RBC AUTO-ENTMCNC: 33.3 G/DL (ref 31.4–35)
MCV RBC AUTO: 90.1 FL (ref 79.6–97.8)
NRBC # BLD: 0 K/UL (ref 0–0.2)
PHOSPHATE SERPL-MCNC: 2.5 MG/DL (ref 2.3–3.7)
PLATELET # BLD AUTO: 19 K/UL (ref 150–450)
PLATELET COMMENTS,PCOM: ABNORMAL
PMV BLD AUTO: 9.3 FL (ref 9.4–12.3)
POTASSIUM SERPL-SCNC: 3.9 MMOL/L (ref 3.5–5.1)
PROT SERPL-MCNC: 6 G/DL (ref 6.3–8.2)
RBC # BLD AUTO: 2.33 M/UL (ref 4.05–5.2)
RBC MORPH BLD: ABNORMAL
SODIUM SERPL-SCNC: 141 MMOL/L (ref 136–145)
VANCOMYCIN TROUGH SERPL-MCNC: 9.5 UG/ML (ref 5–20)
WBC # BLD AUTO: 0 K/UL (ref 4.3–11.1)
WBC MORPH BLD: ABNORMAL

## 2019-12-03 PROCEDURE — 80053 COMPREHEN METABOLIC PANEL: CPT

## 2019-12-03 PROCEDURE — 99232 SBSQ HOSP IP/OBS MODERATE 35: CPT | Performed by: INTERNAL MEDICINE

## 2019-12-03 PROCEDURE — 74011250637 HC RX REV CODE- 250/637: Performed by: NURSE PRACTITIONER

## 2019-12-03 PROCEDURE — 65270000015 HC RM PRIVATE ONCOLOGY

## 2019-12-03 PROCEDURE — 86644 CMV ANTIBODY: CPT

## 2019-12-03 PROCEDURE — 84100 ASSAY OF PHOSPHORUS: CPT

## 2019-12-03 PROCEDURE — 83735 ASSAY OF MAGNESIUM: CPT

## 2019-12-03 PROCEDURE — 87328 CRYPTOSPORIDIUM AG IA: CPT

## 2019-12-03 PROCEDURE — 87329 GIARDIA AG IA: CPT

## 2019-12-03 PROCEDURE — 74011250636 HC RX REV CODE- 250/636: Performed by: INTERNAL MEDICINE

## 2019-12-03 PROCEDURE — 36591 DRAW BLOOD OFF VENOUS DEVICE: CPT | Performed by: NURSE PRACTITIONER

## 2019-12-03 PROCEDURE — 80202 ASSAY OF VANCOMYCIN: CPT

## 2019-12-03 PROCEDURE — 0107U C DIFF TOX AG DETCJ IA STOOL: CPT

## 2019-12-03 PROCEDURE — 83615 LACTATE (LD) (LDH) ENZYME: CPT

## 2019-12-03 PROCEDURE — P9040 RBC LEUKOREDUCED IRRADIATED: HCPCS

## 2019-12-03 PROCEDURE — 74011250636 HC RX REV CODE- 250/636: Performed by: NURSE PRACTITIONER

## 2019-12-03 PROCEDURE — 74011000258 HC RX REV CODE- 258: Performed by: INTERNAL MEDICINE

## 2019-12-03 PROCEDURE — 86900 BLOOD TYPING SEROLOGIC ABO: CPT

## 2019-12-03 PROCEDURE — 86923 COMPATIBILITY TEST ELECTRIC: CPT

## 2019-12-03 PROCEDURE — 36430 TRANSFUSION BLD/BLD COMPNT: CPT | Performed by: NURSE PRACTITIONER

## 2019-12-03 PROCEDURE — 74011250637 HC RX REV CODE- 250/637: Performed by: INTERNAL MEDICINE

## 2019-12-03 PROCEDURE — 85025 COMPLETE CBC W/AUTO DIFF WBC: CPT

## 2019-12-03 PROCEDURE — 74011000250 HC RX REV CODE- 250: Performed by: NURSE PRACTITIONER

## 2019-12-03 PROCEDURE — 65270000029 HC RM PRIVATE

## 2019-12-03 RX ORDER — VANCOMYCIN HYDROCHLORIDE
1250 EVERY 12 HOURS
Status: DISCONTINUED | OUTPATIENT
Start: 2019-12-03 | End: 2019-12-04

## 2019-12-03 RX ORDER — SODIUM CHLORIDE 9 MG/ML
250 INJECTION, SOLUTION INTRAVENOUS AS NEEDED
Status: DISCONTINUED | OUTPATIENT
Start: 2019-12-03 | End: 2019-12-12 | Stop reason: HOSPADM

## 2019-12-03 RX ADMIN — PRAVASTATIN SODIUM 10 MG: 20 TABLET ORAL at 22:38

## 2019-12-03 RX ADMIN — ACYCLOVIR 400 MG: 800 TABLET ORAL at 08:04

## 2019-12-03 RX ADMIN — LOPERAMIDE HYDROCHLORIDE 2 MG: 2 CAPSULE ORAL at 08:03

## 2019-12-03 RX ADMIN — CAMPHOR AND MENTHOL: 5; 5 LOTION TOPICAL at 08:11

## 2019-12-03 RX ADMIN — CAMPHOR AND MENTHOL: 5; 5 LOTION TOPICAL at 16:20

## 2019-12-03 RX ADMIN — POTASSIUM CHLORIDE 20 MEQ: 20 TABLET, EXTENDED RELEASE ORAL at 08:03

## 2019-12-03 RX ADMIN — PIPERACILLIN AND TAZOBACTAM 3.38 G: 3; .375 INJECTION, POWDER, FOR SOLUTION INTRAVENOUS at 13:27

## 2019-12-03 RX ADMIN — LORAZEPAM 1 MG: 1 TABLET ORAL at 22:37

## 2019-12-03 RX ADMIN — PIPERACILLIN AND TAZOBACTAM 3.38 G: 3; .375 INJECTION, POWDER, FOR SOLUTION INTRAVENOUS at 05:12

## 2019-12-03 RX ADMIN — ACETAMINOPHEN 650 MG: 325 TABLET, FILM COATED ORAL at 19:58

## 2019-12-03 RX ADMIN — ACETAMINOPHEN 650 MG: 325 TABLET, FILM COATED ORAL at 13:27

## 2019-12-03 RX ADMIN — ALLOPURINOL 300 MG: 300 TABLET ORAL at 08:04

## 2019-12-03 RX ADMIN — TEMAZEPAM 15 MG: 15 CAPSULE ORAL at 22:37

## 2019-12-03 RX ADMIN — DULOXETINE 30 MG: 30 CAPSULE, DELAYED RELEASE ORAL at 08:03

## 2019-12-03 RX ADMIN — DRONABINOL 2.5 MG: 2.5 CAPSULE ORAL at 08:03

## 2019-12-03 RX ADMIN — PIPERACILLIN AND TAZOBACTAM 3.38 G: 3; .375 INJECTION, POWDER, FOR SOLUTION INTRAVENOUS at 22:37

## 2019-12-03 RX ADMIN — LOPERAMIDE HYDROCHLORIDE 2 MG: 2 CAPSULE ORAL at 23:06

## 2019-12-03 RX ADMIN — VORICONAZOLE 200 MG: 200 TABLET, FILM COATED ORAL at 22:37

## 2019-12-03 RX ADMIN — TBO-FILGRASTIM 480 MCG: 480 INJECTION, SOLUTION SUBCUTANEOUS at 22:37

## 2019-12-03 RX ADMIN — VANCOMYCIN HYDROCHLORIDE 1250 MG: 10 INJECTION, POWDER, LYOPHILIZED, FOR SOLUTION INTRAVENOUS at 19:55

## 2019-12-03 RX ADMIN — POTASSIUM CHLORIDE 20 MEQ: 20 TABLET, EXTENDED RELEASE ORAL at 17:52

## 2019-12-03 RX ADMIN — LORATADINE 10 MG: 10 TABLET ORAL at 08:03

## 2019-12-03 RX ADMIN — VANCOMYCIN HYDROCHLORIDE 750 MG: 10 INJECTION, POWDER, LYOPHILIZED, FOR SOLUTION INTRAVENOUS at 09:49

## 2019-12-03 RX ADMIN — CAMPHOR AND MENTHOL: 5; 5 LOTION TOPICAL at 22:37

## 2019-12-03 RX ADMIN — ACYCLOVIR 400 MG: 800 TABLET ORAL at 17:52

## 2019-12-03 RX ADMIN — Medication 2 TABLET: at 08:03

## 2019-12-03 RX ADMIN — PROCHLORPERAZINE EDISYLATE 5 MG: 5 INJECTION INTRAMUSCULAR; INTRAVENOUS at 16:14

## 2019-12-03 RX ADMIN — VORICONAZOLE 200 MG: 200 TABLET, FILM COATED ORAL at 08:04

## 2019-12-03 RX ADMIN — DIPHENHYDRAMINE HYDROCHLORIDE 25 MG: 25 CAPSULE ORAL at 13:27

## 2019-12-03 NOTE — PROGRESS NOTES
Pharmacokinetic Consult to Pharmacist 
 
Janis Goff is a 68 y.o. female being treated for febrile neutropenia with  Vancomycin and zosyn. Height: 5' 6\" (167.6 cm)  Weight: 84 kg (185 lb 1.6 oz) Lab Results Component Value Date/Time BUN 10 12/03/2019 03:00 AM  
 Creatinine 0.54 (L) 12/03/2019 03:00 AM  
 WBC 0.0 (LL) 12/03/2019 03:00 AM  
 Procalcitonin 0.1 09/21/2019 06:50 PM  
 Lactic Acid (POC) 0.67 09/21/2019 08:51 PM  
  
Estimated Creatinine Clearance: 78.2 mL/min (A) (by C-G formula based on SCr of 0.54 mg/dL (L)). Lab Results Component Value Date/Time Vancomycin,trough 9.5 12/03/2019 08:11 AM  
 
 
Day 3 of vancomycin. Goal trough is 15-20. Tr low at 9.5. Ke = 0.071. Change to 1.25g q12h, estimated pk/tr = 35/17. Next dose at 2000 this evening. Will continue to follow patient. Thank you, Shantelle Cadena, Pharm. D. Clinical Pharmacist 
[phone]

## 2019-12-03 NOTE — PROGRESS NOTES
Infectious Disease Progress Note Today's Date: 12/3/2019 Admit Date: 2019 Impression: · E faecalis bacteremia/Alpha strep (); repeat blood cx () NG ; TTE (-); source Port vs GI 
· AML; admitted for salvage FLAG-VENITA · Febrile neutropenia · Pancytopenia · Diarrhea; chronic Plan:  
· Continue zosyn · Continue vancomycin for now and follow pending cultures and fever curve · Repeat CMV PCR · Send stool for giardia and cryptosporidium assays in the setting of chronic diarrhea. Anti-infectives: · Juliet Pine · ACV 
· IV Vancomycin (-) · IV Cefepime (-) · PO LVQ (-) · IV Zosyn (- Subjective: She had higher fever last night. No appetite. Diarrhea is unchanged. No other focal symptoms. No Known Allergies Review of Systems:  A comprehensive review of systems was negative except for that written in the History of Present Illness. Objective:  
 
Visit Vitals /56 (BP 1 Location: Left arm, BP Patient Position: At rest) Pulse 97 Temp 98.9 °F (37.2 °C) Resp 18 Ht 5' 6\" (1.676 m) Wt 84 kg (185 lb 1.6 oz) SpO2 97% BMI 29.88 kg/m² Temp (24hrs), Av.5 °F (37.5 °C), Min:98.3 °F (36.8 °C), Max:102 °F (38.9 °C) Lines:  Left chest port:    
 
Physical Exam:   
General:  Alert, cooperative, no acute distress, appears stated age, appears chronically ill, alopecia Eyes:  Anicteric sclerae, no drainage Mouth/Throat: Mucous membranes normal, oral pharynx clear Neck: Symmetric, supple, no JVD Lungs:   Clear without increased work of breathing, no audible wheezes CV:  RRR without audible murmur Abdomen:   Soft, non tender, active bowel sounds Extremities: Trace bilateral LE edema Skin: Papular rash now only on left forearm; + petechiae on both arms Lymph nodes: Cervical and supraclavicular without palpable nodes Musculoskeletal: No swelling or deformity Lines/Devices:  Intact, no erythema, drainage or tenderness Psych: Alert and oriented, appropriate Data Review: CBC: 
Recent Labs 12/03/19 0300 12/02/19 0304 12/01/19 
0246 WBC 0.0* 0.0* 0.0* GRANS  --  50  -- MONOS  --  0*  --   
EOS  --  0*  --   
ANEU  --  0.0*  -- ABL  --  0.0*  --   
HGB 7.0* 7.1* 7.6* HCT 21.0* 21.3* 22.2*  
PLT 19* 32* 10* BMP: 
Recent Labs 12/03/19 0300 12/02/19 
0304 12/01/19 
0246 CREA 0.54* 0.54* 0.55* BUN 10 11 12  142 140  
K 3.9 3.8 4.0  
* 110* 108* CO2 26 26 26 AGAP 6* 6* 6*  
GLU 99 93 98 LFTS: 
Recent Labs 12/03/19 0300 12/02/19 0304 12/01/19 
0246 TBILI 0.4 0.3 0.4 ALT 26 30 26 SGOT 17 18 14* AP 79 78 74  
TP 6.0* 5.8* 6.0* ALB 2.0* 1.9* 1.9* Microbiology:  
 
All Micro Results Procedure Component Value Units Date/Time CULTURE, BLOOD [[id number]] Collected:  12/01/19 2043 Order Status:  Completed Specimen:  Blood Updated:  12/03/19 9847 Special Requests: --     
  RIGHT Antecubital 
  
  Culture result: NO GROWTH 2 DAYS     
 CULTURE, BLOOD [[id number]] Collected:  12/01/19 2003 Order Status:  Completed Specimen:  Blood Updated:  12/03/19 3527 Special Requests: MEDIAL PORT Culture result: NO GROWTH 2 DAYS     
 CULTURE, URINE [[id number]] Collected:  12/01/19 2058 Order Status:  Completed Specimen:  Urine from Clean catch Updated:  12/03/19 3727 Special Requests: NO SPECIAL REQUESTS Culture result: NO GROWTH 1 DAY     
 OVA & PARASITES, STOOL [[id number]] Collected:  11/22/19 3361 Order Status:  Completed Specimen:  Stool Updated:  11/27/19 1236 Source STOOL Ova & Parasite exam Final Report Below Comment: (NOTE) These results were obtained using wet preparation(s) and trichrome 
stained smear. This test does not include testing for Cryptosporidium parvum, Cyclospora, or Microsporidia. Performed At: [facility] [address] [place] [address], [address] [place] [address] Basim Casiano MD UK:[id number] C. DIFFICILE AG & TOXIN A/B [[id number]] Collected:  11/27/19 0525 Order Status:  Completed Specimen:  Stool Updated:  11/27/19 1233 7007 Elena Powderly ANTIGEN    
  C. DIFFICILE GDH ANTIGEN-NEGATIVE  
     
  C. difficile toxin C. DIFFICILE TOXIN-NEGATIVE  
     
  PCR Reflex NOT APPLICABLE INTERPRETATION    
  NEGATIVE FOR TOXIGENIC C. DIFFICILE Clinical Consideration NEGATIVE FOR TOXIGENIC C. DIFFICILE  
     
 CULTURE, BLOOD [[id number]] Collected:  11/21/19 0106 Order Status:  Completed Specimen:  Blood Updated:  11/26/19 6167 Special Requests: --     
  RIGHT 
HAND Culture result: NO GROWTH 5 DAYS     
 CULTURE, BLOOD [[id number]] Collected:  11/20/19 2028 Order Status:  Completed Specimen:  Blood Updated:  11/25/19 2081 Special Requests: PORT Culture result: NO GROWTH 5 DAYS     
 CULTURE, STOOL [[id number]] Collected:  11/22/19 1832 Order Status:  Completed Specimen:  Stool Updated:  11/24/19 9772 Special Requests: NO SPECIAL REQUESTS Culture result:    
  No Salmonella, Shigella, or Ecoli 0157 isolated. NO GROWTH OF GRAM NEGATIVE FECAL REGGIE,  
     
 CULTURE, BLOOD [[id number]] Collected:  11/18/19 2032 Order Status:  Completed Specimen:  Blood Updated:  11/23/19 4571 Special Requests: --     
  RIGHT 
HAND Culture result: NO GROWTH 5 DAYS     
 CULTURE, BLOOD [[id number]]  (Abnormal)  (Susceptibility) Collected:  11/18/19 1955 Order Status:  Completed Specimen:  Blood Updated:  11/22/19 0730 Special Requests: LATERAL PORT     
  GRAM STAIN GRAM POS COCCI IN CHAINS AEROBIC AND ANAEROBIC BOTTLES RESULTS VERIFIED, PHONED TO AND READ BACK BY EJ CORREA RN @ 0775 ON 11/19/2019 BY AMM   Culture result:    
  ENTEROCOCCUS FAECALIS GROUP D  
     
      
 ALPHA STREPTOCOCCUS, NOT S. PNEUMONIAE THIS ORGANISM MAY BE INDICATIVE OF CULTURE CONTAMINATION, HOWEVER, CLINICAL CORRELATION NEEDS TO BE EVALUATED, AS EACH CASE IS UNIQUE. REFER  Renzo Mendoza PANEL [id number] CULTURE, URINE [[id number]] Collected:  11/19/19 0102 Order Status:  Completed Specimen:  Urine from Clean catch Updated:  11/21/19 0710 Special Requests: NO SPECIAL REQUESTS Culture result: NO GROWTH 2 DAYS     
 BLOOD CULTURE ID PANEL [[id number]]  (Abnormal) Collected:  11/18/19 1955 Order Status:  Completed Specimen:  Blood Updated:  11/19/19 1420 Acc. no. from [employer] [id number] Enterococcus DETECTED Streptococcus DETECTED Comment: RESULTS VERIFIED, PHONED TO AND READ BACK BY 
EJ CORREA RN @ 1712 ON 11/19/2019 BY [facility]OW ALMODOVAR/ZARIA (Vancomycin Resistance Gene) NOT DETECTED Clinical Consideration Multiple organisms detected. Results do not replace susceptibility testing. Fouzia Herb [[id number]] Collected:  11/19/19 0415 Order Status:  Canceled Specimen:  Stool Imaging: CXR 12/1/19 IMPRESSION: No acute cardiopulmonary abnormality. No infiltrate appreciated. Signed By: Vanessa Christina MD   
 December 3, 2019

## 2019-12-03 NOTE — PROGRESS NOTES
0659-Bedside report received from Janneth Lujan RN. Resting in bed. No needs voiced. No s/s of acute distress. 1850-END OF SHIFT NOTE: 
Pt's VSS and is in no acute distress. Pt needs stool sample for giardia/cryptosporidium. Pt on Vanc and Cef. Pt afebrile today. Pt received one unit of PRBCs today. Intake/Output 12/03 0701 - 12/03 1900 In: 7152 [P.O.:480; I.V.:603] Out: 600 [Urine:600] Voiding: YES Catheter: NO 
Drain:   
 
 
Stool:  2 occurrences. Stool Assessment Stool Color: Berenice Scriver (12/02/19 1147) Stool Appearance: Loose (12/03/19 0800) Stool Amount: Medium (12/02/19 1147) Stool Source/Status: Rectum (12/02/19 1147) Emesis:  0 occurrences. VITAL SIGNS Patient Vitals for the past 12 hrs: 
 Temp Pulse Resp BP SpO2  
12/03/19 1616 99.1 °F (37.3 °C) 91 16 128/62 93 % 12/03/19 1553 99.1 °F (37.3 °C) 93 16 134/64 91 % 12/03/19 1453 98.9 °F (37.2 °C) 97 18 126/56 97 % 12/03/19 1353 99.5 °F (37.5 °C) 98 18 127/53 98 % 12/03/19 1330 98.3 °F (36.8 °C) (!) 101 18 150/54 97 % 12/03/19 1040 98.9 °F (37.2 °C) (!) 103 16 126/61 97 % 12/03/19 0720 98.8 °F (37.1 °C) (!) 108 16 131/53 96 % Pain Assessment Pain 1 Pain Scale 1: Numeric (0 - 10) (12/03/19 1447) Pain Intensity 1: 0 (12/03/19 1447) Patient Stated Pain Goal: 0 (12/03/19 0720) Pain Reassessment 1: Patient resting w/respiratory rate greater than 10 (12/01/19 0340) Pain Onset 1: Upon awakening (12/01/19 0242) Pain Location 1: Head (12/01/19 0242) Pain Orientation 1: Lower (12/01/19 0242) Pain Description 1: Aching (12/01/19 0242) Pain Intervention(s) 1: Medication (see MAR) (12/01/19 0242) Ambulating Yes Pura Rai 1907-Bedside shift change report given to [address] [place] [address] (oncoming nurse) by Derick Ye RN (offgoing nurse). Report included the following information SBAR, Kardex, Intake/Output, MAR and Recent Results.

## 2019-12-03 NOTE — PROGRESS NOTES
6903 Critical CBC readback/addressed per SOP. 
 
EOS; D+26 s/p Shreya FLAG. T max 102 resolved with Tylenol. Febrile workup completed 12/1. No loose stools this shift. Pt stronger and gait steady. Low spirits, \"afraid counts won't come back. \" Provided emotional support. Remains neutropenic on Granix. Hgb 7.0, ordered one unit PRBC's. Continue with POC. Report to oncoming RN.

## 2019-12-03 NOTE — PROGRESS NOTES
Problem: Falls - Risk of 
Goal: *Absence of Falls Description Document Denis Hardy Fall Risk and appropriate interventions in the flowsheet. Outcome: Progressing Towards Goal 
Note: Fall Risk Interventions: 
Mobility Interventions: Communicate number of staff needed for ambulation/transfer, Patient to call before getting OOB, Strengthening exercises (ROM-active/passive) Medication Interventions: Teach patient to arise slowly Elimination Interventions: Call light in reach History of Falls Interventions: Consult care management for discharge planning, Room close to nurse's station Problem: Anemia Care Plan (Adult and Pediatric) Goal: *Labs within defined limits Outcome: Progressing Towards Goal 
Goal: *Tolerates increased activity Outcome: Progressing Towards Goal 
  
Problem: Diarrhea (Adult and Pediatrics) Goal: *Absence of diarrhea Outcome: Progressing Towards Goal 
  
Problem: Nausea/Vomiting (Adult) Goal: *Absence of nausea/vomiting Outcome: Progressing Towards Goal 
  
Problem: Nutrition Deficit Goal: *Optimize nutritional status Outcome: Progressing Towards Goal

## 2019-12-03 NOTE — PROGRESS NOTES
[facility] Hematology & Oncology Inpatient Hematology / Oncology Daily Progress Note Reason for Admission:  Admission for antineoplastic chemotherapy [Z51.11] 24 Hour Events: 
Tmax 102, VSS, on O2 @ 2L On Zosyn for BCx+ enterococcus/streptococcus Repeat BCx-NGTD Day 26 FLAG-VENITA Awaiting count recovery, on Granix  at bedside ROS: 
Constitutional: +fatigue +fever CV: Negative for chest pain, palpitations, edema. Respiratory: Negative for dyspnea, cough, wheezing. GI: Negative for nausea, abdominal pain. +Diarrhea- improved. 10 point review of systems is otherwise negative with the exception of the elements mentioned above in the HPI. No Known Allergies Past Medical History:  
Diagnosis Date  AML (acute myeloid leukemia) ([facility] [address] dx- 4/2019  
 followed by dr Ruthann Nielson  Depression  Hypercholesterolemia  Infection   
 of port -- was placed 5/2019-- removed 6/2019---right chest  
 Psychiatric disorder   
 aniexty  Sleep apnea Past Surgical History:  
Procedure Laterality Date  HX OTHER SURGICAL    
 colonoscopy  HX VASCULAR ACCESS    
 IR INSERT TUNL CVC W PORT OVER 5 YEARS  4/30/2019  IR INSERT TUNL CVC W PORT OVER 5 YEARS  7/15/2019  IR REMOVE TUNL CVAD W PORT/PUMP  6/13/2019 Family History Problem Relation Age of Onset  Cancer Father Social History Socioeconomic History  Marital status:  Spouse name: Not on file  Number of children: Not on file  Years of education: Not on file  Highest education level: Not on file Occupational History  Not on file Social Needs  Financial resource strain: Not on file  Food insecurity:  
  Worry: Not on file Inability: Not on file  Transportation needs:  
  Medical: Not on file Non-medical: Not on file Tobacco Use  Smoking status: Never Smoker  Smokeless tobacco: Never Used Substance and Sexual Activity  Alcohol use: Never Frequency: Never  Drug use: Never  Sexual activity: Not on file Lifestyle  Physical activity:  
  Days per week: Not on file Minutes per session: Not on file  Stress: Not on file Relationships  Social connections:  
  Talks on phone: Not on file Gets together: Not on file Attends Qu[employer] service: Not on file Active member of club or organization: Not on file Attends meetings of clubs or organizations: Not on file Relationship status: Not on file  Intimate partner violence:  
  Fear of current or ex partner: Not on file Emotionally abused: Not on file Physically abused: Not on file Forced sexual activity: Not on file Other Topics Concern [address] Service Not Asked  Blood Transfusions Not Asked  Caffeine Concern Not Asked  Occupational Exposure Not Asked [place] Pleas Hazards Not Asked  Sleep Concern Not Asked  Stress Concern Not Asked  Weight Concern Not Asked  Special Diet Not Asked  Back Care Not Asked  Exercise Not Asked  Bike Helmet Not Asked  Seat Belt Not Asked  Self-Exams Not Asked Social History Narrative  Not on file Current Facility-Administered Medications Medication Dose Route Frequency Provider Last Rate Last Dose  
 0.9% sodium chloride infusion 250 mL  250 mL IntraVENous PRN Loyd Read MD      
 vancomycin (VANCOCIN) 1250 mg in  ml infusion  1,250 mg IntraVENous Q12H Angel Sanches MD      
 0.9% sodium chloride infusion 250 mL  250 mL IntraVENous PRN Loyd Read MD      
 acetaminophen/diphenhydrAMINE (TYLENOL PM EXT STR) 500/25 mg (Patient Supplied)   Oral QHS PRN Melissa Nowak NP      
 dronabinol (MARINOL) capsule 2.5 mg  2.5 mg Oral DAILY Rosie oMntoya NP   2.5 mg at 12/03/19 7332  temazepam (RESTORIL) capsule 15 mg  15 mg Oral QHS Aydin Kirkland NP   15 mg at 12/02/19 2204  camphor-menthol (SARNA) 0.5-0.5 % lotion   Topical TID Kelsi Chen NP      
  piperacillin-tazobactam (ZOSYN) 3.375 g in 0.9% sodium chloride (MBP/ADV) 100 mL  3.375 g IntraVENous Q8H Jamarcus Dickerson MD 25 mL/hr at 12/03/19 0512 3.375 g at 12/03/19 2761  baclofen (LIORESAL) tablet 5 mg  5 mg Oral Q8H PRN Stephen Farley MD   5 mg at 11/21/19 1335  potassium chloride (K-DUR, KLOR-CON) SR tablet 20 mEq  20 mEq Oral BID Stephen Farley MD   20 mEq at 12/03/19 0803  
 albuterol (PROVENTIL VENTOLIN) nebulizer solution 2.5 mg  2.5 mg Nebulization Q6H PRN Nigel Cabezas NP   2.5 mg at 11/21/19 1600  
 alum-mag hydroxide-simeth (MYLANTA) oral suspension 30 mL  30 mL Oral Q4H PRN Stephen Farley MD   30 mL at 11/30/19 4882  loperamide (IMODIUM) capsule 2 mg  2 mg Oral Q4H PRN Stephen Farley MD   2 mg at 12/03/19 1469  loratadine (CLARITIN) tablet 10 mg  10 mg Oral DAILY Shirley Bustillo NP   10 mg at 12/03/19 1674  central line flush (saline) syringe 10 mL  10 mL InterCATHeter PRN Stephen Farley MD   10 mL at 11/28/19 2109  
 docusate sodium (COLACE) capsule 100 mg  100 mg Oral BID PRN Sully Biswas NP   100 mg at 11/11/19 1308  LORazepam (ATIVAN) injection 0.5 mg  0.5 mg IntraVENous Q6H PRN Stephen Farley MD   0.5 mg at 11/27/19 1201  
 saline peripheral flush soln 10 mL  10 mL InterCATHeter PRN Stephen Farley MD   10 mL at 11/23/19 2229  
 heparin (porcine) pf 300-500 Units  300-500 Units InterCATHeter PRN Stephen Farley MD   300 Units at 12/01/19 0245  tbo-filgrastim (GRANIX) injection 480 mcg  480 mcg SubCUTAneous QHS Stephen Farley MD   480 mcg at 12/02/19 2204  acyclovir (ZOVIRAX) tablet 400 mg  400 mg Oral BID Mickiel Cage, NP   400 mg at 12/03/19 0801  allopurinol (ZYLOPRIM) tablet 300 mg  300 mg Oral DAILY Mickiel Cage, NP   300 mg at 12/03/19 7765  cholecalciferol (VITAMIN D3) (400 Units /10 mcg) tablet 2 Tab  800 Units Oral DAILY Mickiel Cage, NP   2 Tab at 12/03/19 7885  DULoxetine (CYMBALTA) capsule 30 mg  30 mg Oral DAILY Mickiel Cage, NP 30 mg at 19 4417  lidocaine-prilocaine (EMLA) 2.5-2.5 % cream   Topical PRN Casey Helling, NP      
 LORazepam (ATIVAN) tablet 1 mg  1 mg Oral QHS [place] Helling, NP   1 mg at 19  pravastatin (PRAVACHOL) tablet 10 mg  10 mg Oral QHS Casey Helling, NP   10 mg at 19  voriconazole (VFEND) tablet 200 mg  200 mg Oral Q12H [place] Helling, NP   200 mg at 19 4682  ondansetron (ZOFRAN) injection 4 mg  4 mg IntraVENous Q4H PRN Casey Helling, NP   4 mg at 19 1642  prochlorperazine (COMPAZINE) with saline injection 5 mg  5 mg IntraVENous Q6H PRN [place] Helling, NP   5 mg at 19 1632  
 HYDROcodone-acetaminophen (NORCO) 5-325 mg per tablet 1 Tab  1 Tab Oral Q6H PRN [place] Helling, NP   1 Tab at 19 3107  morphine injection 2 mg  2 mg IntraVENous Q4H PRN [place] Helling, NP      
 acetaminophen (TYLENOL) tablet 650 mg  650 mg Oral Q6H PRN Andrea Zuluaga MD   650 mg at 198  diphenhydrAMINE (BENADRYL) capsule 25 mg  25 mg Oral Q6H PRN Andrea Zuluaga MD   25 mg at 19 1418  vit C,W-Rl-bjmxg-lutein-zeaxan (PRESERVISION AREDS-2) capsule 1 Cap (Patient Supplied)  1 Cap Oral DAILY Andrea Zuluaga MD   1 Cap at 19 3812 OBJECTIVE: 
Patient Vitals for the past 8 hrs: 
 BP Temp Pulse Resp SpO2  
19 0720 131/53 98.8 °F (37.1 °C) (!) 108 16 96 % 19 0252 152/67 99.5 °F (37.5 °C) 92 16 95 % Temp (24hrs), Av.7 °F (37.6 °C), Min:98.5 °F (36.9 °C), Max:102 °F (38.9 °C) 
 
701 - 1900 In: 240 [P.O.:240] Out: 500 [Urine:500] Physical Exam: 
Constitutional: Well developed, frail appearing female in no acute distress, sitting comfortably in bed HEENT: Normocephalic and atraumatic. Oropharynx is clear, mucous membranes are moist. Extraocular muscles are intact. Sclerae anicteric. Skin Warm and dry. Scattered rash. No erythema. No pallor. Bruising noted on BUE/R elbow. Respiratory Small crackles at bases,  normal air exchange without accessory muscle use. CVS Normal rate, regular rhythm and normal S1 and S2. No murmurs, gallops, or rubs. Abdomen Soft, mildly tender across lower abd-improving, nondistended, normoactive bowel sounds. Neuro Grossly nonfocal with no obvious sensory or motor deficits. MSK Normal range of motion in general.  No edema and no tenderness. Psych Appropriate mood and affect. Labs:   
Recent Results (from the past 24 hour(s)) METABOLIC PANEL, COMPREHENSIVE Collection Time: 12/03/19  3:00 AM  
Result Value Ref Range Sodium 141 136 - 145 mmol/L Potassium 3.9 3.5 - 5.1 mmol/L Chloride 109 (H) 98 - 107 mmol/L  
 CO2 26 21 - 32 mmol/L Anion gap 6 (L) 7 - 16 mmol/L Glucose 99 65 - 100 mg/dL BUN 10 8 - 23 MG/DL Creatinine 0.54 (L) 0.6 - 1.0 MG/DL  
 GFR est AA >60 >60 ml/min/1.73m2 GFR est non-AA >60 >60 ml/min/1.73m2 Calcium 8.3 8.3 - 10.4 MG/DL Bilirubin, total 0.4 0.2 - 1.1 MG/DL  
 ALT (SGPT) 26 12 - 65 U/L  
 AST (SGOT) 17 15 - 37 U/L Alk. phosphatase 79 50 - 136 U/L Protein, total 6.0 (L) 6.3 - 8.2 g/dL Albumin 2.0 (L) 3.2 - 4.6 g/dL Globulin 4.0 (H) 2.3 - 3.5 g/dL A-G Ratio 0.5 (L) 1.2 - 3.5 MAGNESIUM Collection Time: 12/03/19  3:00 AM  
Result Value Ref Range Magnesium 1.8 1.8 - 2.4 mg/dL LD Collection Time: 12/03/19  3:00 AM  
Result Value Ref Range  110 - 210 U/L  
PHOSPHORUS Collection Time: 12/03/19  3:00 AM  
Result Value Ref Range Phosphorus 2.5 2.3 - 3.7 MG/DL  
CBC WITH AUTOMATED DIFF Collection Time: 12/03/19  3:00 AM  
Result Value Ref Range WBC 0.0 (LL) 4.3 - 11.1 K/uL  
 RBC 2.33 (L) 4.05 - 5.2 M/uL HGB 7.0 (L) 11.7 - 15.4 g/dL HCT 21.0 (LL) 35.8 - 46.3 % MCV 90.1 79.6 - 97.8 FL  
 MCH 30.0 26.1 - 32.9 PG  
 MCHC 33.3 31.4 - 35.0 g/dL  
 RDW 12.9 11.9 - 14.6 % PLATELET 19 (LL) 847 - 450 K/uL  MPV 9.3 (L) 9.4 - 12.3 FL  
 ABSOLUTE NRBC 0.00 0.0 - 0.2 K/uL  
 RBC COMMENTS NORMOCYTIC/NORMOCHROMIC    
 WBC COMMENTS WBC TOO FEW TO DIFFERENTIATE PLATELET COMMENTS MARKED    
 DF MANUAL    
TYPE + CROSSMATCH Collection Time: 12/03/19  7:05 AM  
Result Value Ref Range Crossmatch Expiration 12/06/2019 ABO/Rh(D) AB POSITIVE Antibody screen NEG   
VANCOMYCIN, TROUGH Collection Time: 12/03/19  8:11 AM  
Result Value Ref Range Vancomycin,trough 9.5 5 - 20 ug/mL Imaging: 
CT HEAD WO CONT [[id number]] Collected: 11/19/19 1050 Order Status: Completed Updated: 11/19/19 1054 Narrative:    
CT HEAD WITHOUT CONTRAST, 11/19/2019 History: Fall last night hitting left side of head Comparison: . Technique:   5 mm axial scans from the skull base to the vertex. All CT scans 
performed at this facility use one or all of the following: Automated exposure 
control, adjustment of the mA and/or kVp according to patient's size, iterative 
reconstruction. Findings:  No evidence of intracranial hemorrhage is seen. Faint bilateral basal 
ganglia calcifications are seen. No abnormal extra-axial fluid collections are 
seen.  Mild cortical involutional changes are seen which are not felt to be 
abnormal given the patient's age. Annye Canton evidence for acute hydrocephalus is seen. No evidence of midline shift or herniation is seen.  No abnormal edema pattern 
is seen in a vascular distribution to suggest large artery infarction. Evaluation with bone windows shows no acute osseous changes.  The visualized 
sinuses, middle ears, and mastoid air cells are well aerated. Impression:    
IMPRESSION:   
1.  No acute intracranial process evident by noncontrast CT study of the head. XR CHEST SNGL V [[id number]] Collected: 11/18/19 2041 Order Status: Completed Updated: 11/18/19 2044 Narrative:    
EXAM: XR CHEST SNGL V 
 
INDICATION: neutropenic fever COMPARISON: 9/29/2019 FINDINGS: A portable AP radiograph of the chest was obtained at 1946 hours. The 
patient is on a cardiac monitor. The lungs are clear. The cardiac and 
mediastinal contours and pulmonary vascularity are normal.  The bones and soft 
tissues are grossly within normal limits. Impression:    
IMPRESSION: Normal chest.  
XR ELBOW RT MIN 3 V [[id number]] Collected: 11/10/19 1421 Order Status: Completed Updated: 11/10/19 1424 Narrative:    
Right elbow Clinical location: Elbow pain after feeling a pop, decreased range of motion FINDINGS: Four views of the right elbow show no fracture or dislocation. There 
is no joint effusion. The soft tissues are unremarkable. Impression:    
IMPRESSION: No acute osseous abnormality or joint derangement of the right 
elbow. ASSESSMENT: 
Problem List  Date Reviewed: 10/31/2019 Codes Class Noted Febrile neutropenia (HCC) ICD-10-CM: D70.9, R50.81 ICD-9-CM: 288.00, 780.61  9/21/2019 Pancytopenia due to antineoplastic chemotherapy [facility]) ICD-10-CM: D61.810, T45.1X5A 
ICD-9-CM: 284.11, E933.1  6/12/2019 Cellulitis of neck ICD-10-CM: Y55.822 ICD-9-CM: 682.1  6/12/2019 Immunocompromised status associated with infection ICD-10-CM: B99.9 ICD-9-CM: 136.9  6/12/2019 Port or reservoir infection ICD-10-CM: X99.542S ICD-9-CM: 999.33  6/12/2019 Acute myeloid leukemia not having achieved remission ([facility] ICD-10-CM: C92.00 ICD-9-CM: 205.00  5/9/2019 Admission for antineoplastic chemotherapy ICD-10-CM: Z51.11 ICD-9-CM: V58.11  5/5/2019 AML (acute myeloblastic leukemia) ([facility] [address] ICD-10-CM: C92.00 ICD-9-CM: 205.00  4/28/2019 Weakness generalized ICD-10-CM: R53.1 ICD-9-CM: 780.79  4/28/2019 Pancytopenia ([facility] ICD-10-CM: D72.748 ICD-9-CM: 284.19  4/28/2019 Thrombocytopenia ([facility] [address] ICD-10-CM: D69.6 ICD-9-CM: 287.5  4/27/2019 Ms. Jose Reyes is a 68 y.o. female admitted on 11/7/2019.   She is a known patient of Dr. 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 She failed induction with 7+3/Midostaurin. On Dacogen/Gilteritinib with recent BMbx with persistent residual disease. She is being admitted for FLAG-VENITA. She is feeling well and is ready to proceed with treatment. PLAN: 
AML 
- admit for salvage FLAG-VENITA 
- needs Echo prior - ordered 11/8 Day 1 FLAG-VENITA. Echo with EF 55-60%, proceed with tx. 
11/9 Day 2 FLAG-VENITA. Tolerating well, no issues. Uric acid 3.1 today. 11/10 Day 3 FLAG-VENITA. No issues with treatment. Uric acid 3.3 today. 11/12 Day 5 FLAG-VENITA. Tolerating treatment well. G-CSF to start tomorrow. 11/13 Day 6 FLAG-VENITA, doing well, Granix/claritin starts today 12/3 Day 26 FLAG VENITA. Awaiting count recovery, con't Granix. If no count recovery by end of week, plan for repeat BMbx. Pancytopenia secondary to disease - Transfuse prn per Alexander SOPs R elbow pain 11/11 c/o R elbow pain with limited ROM yesterday. Xray neg. Pain improved today, noted bruising. Normal ROM on exam. 
 
Mild Abd discomfort/loose stools 11/13 discomfort is improved from yesterday, will monitor 11/14 loose stools, but not watery, can use Imodium prn 
11/15 Imodium working well  
11/16 controlled 11/26 Ova and parasite 11/22 pending. Check for C diff if stool appropriate. Continue anti diarrheal for now Neutropenic fever / Sepsis not present on admission / UTI / Strep/Enterococcus bacteremia 11/19 Tmax 102. 1.  UA +UTI. UCx pending. BCx-NGTD. CXR neg. On Cef/Vanc. DC prophylactic LVQ. 11/20 Tmax 102. BCx positive for streptococcus/enterococcus. Subculture in progress. On Cef/Vanc.   
11/22 repeat BC NTD. Alpha strep on vanc. 11/24 Repeat BC NTD. No fever. 11/25 new skin rash. ?RT to vanc. Consult ID for recommendations. 11/26 ID dc'd cefe and vanc. Started zosyn. TTE ordered. 12/2 Tmax 101. CXR neg.  UA neg. Repeat cultures. On Zosyn, restarted Vanc last PM. 12/3 Tmax 102. Cultures NGTD. Con't Zosyn/Vanc. Fall 
11/19 s/p last PM.  No LOC. Hit head. CT head neg. Double vision 11/21 Neuro has seen patient. Concerns for possible 6th nerve palsy. Recommends LP. We will hold with WBC 0.0 and continue to monitor. 11/22 vision improved today. MRI brain pending. 11/23 MRI unremarkable. Discussed with Neuro. No need for LP 
11/27 Resolved Continue home meds Prophylactic Antibx: Acyclovir, Voriconazole Mily SOPs SCDs for DVT prophylaxis (AC contraindicated d/t thrombocytopenia) Goals and plan of care reviewed with the patient. All questions answered to the best of our ability. Disposition: Day 26 FLAG-VENITA. Awaiting count recovery, con't Granix. If no count recovery by end of week, plan for repeat BMbx. Transfusions per mily SOPs. PBRCs today. Upon discharge will need twice weekly labs w transfusions as needed. Weekly provider visits. RTC within 1 week from discharge. Ismael Hoffman, ARMANDO [facility] Hematology & Oncology [id number] [place] [address] Office : [phone] Fax : [phone]

## 2019-12-04 LAB
ABO + RH BLD: NORMAL
ALBUMIN SERPL-MCNC: 2 G/DL (ref 3.2–4.6)
ALBUMIN/GLOB SERPL: 0.5 {RATIO} (ref 1.2–3.5)
ALP SERPL-CCNC: 80 U/L (ref 50–136)
ALT SERPL-CCNC: 25 U/L (ref 12–65)
ANION GAP SERPL CALC-SCNC: 5 MMOL/L (ref 7–16)
AST SERPL-CCNC: 16 U/L (ref 15–37)
BACTERIA SPEC CULT: NORMAL
BILIRUB SERPL-MCNC: 0.4 MG/DL (ref 0.2–1.1)
BLD PROD TYP BPU: NORMAL
BLOOD GROUP ANTIBODIES SERPL: NORMAL
BPU ID: NORMAL
BUN SERPL-MCNC: 11 MG/DL (ref 8–23)
C DIFF GDH STL QL: NORMAL
C DIFF TOX A+B STL QL IA: NORMAL
CALCIUM SERPL-MCNC: 7.9 MG/DL (ref 8.3–10.4)
CHLORIDE SERPL-SCNC: 111 MMOL/L (ref 98–107)
CLINICAL CONSIDERATION: NORMAL
CO2 SERPL-SCNC: 25 MMOL/L (ref 21–32)
CREAT SERPL-MCNC: 0.53 MG/DL (ref 0.6–1)
CROSSMATCH RESULT,%XM: NORMAL
DIFFERENTIAL METHOD BLD: ABNORMAL
ERYTHROCYTE [DISTWIDTH] IN BLOOD BY AUTOMATED COUNT: 13.3 % (ref 11.9–14.6)
GLOBULIN SER CALC-MCNC: 4.1 G/DL (ref 2.3–3.5)
GLUCOSE SERPL-MCNC: 97 MG/DL (ref 65–100)
HCT VFR BLD AUTO: 24.1 % (ref 35.8–46.3)
HGB BLD-MCNC: 8.2 G/DL (ref 11.7–15.4)
INTERPRETATION: NORMAL
LDH SERPL L TO P-CCNC: 162 U/L (ref 110–210)
MAGNESIUM SERPL-MCNC: 1.8 MG/DL (ref 1.8–2.4)
MCH RBC QN AUTO: 30 PG (ref 26.1–32.9)
MCHC RBC AUTO-ENTMCNC: 34 G/DL (ref 31.4–35)
MCV RBC AUTO: 88.3 FL (ref 79.6–97.8)
NRBC # BLD: 0 K/UL (ref 0–0.2)
PCR REFLEX: NORMAL
PHOSPHATE SERPL-MCNC: 2.3 MG/DL (ref 2.3–3.7)
PLATELET # BLD AUTO: 10 K/UL (ref 150–450)
PLATELET COMMENTS,PCOM: ABNORMAL
PMV BLD AUTO: 10.7 FL (ref 9.4–12.3)
POTASSIUM SERPL-SCNC: 4 MMOL/L (ref 3.5–5.1)
PROT SERPL-MCNC: 6.1 G/DL (ref 6.3–8.2)
RBC # BLD AUTO: 2.73 M/UL (ref 4.05–5.2)
RBC MORPH BLD: ABNORMAL
SERVICE CMNT-IMP: NORMAL
SODIUM SERPL-SCNC: 141 MMOL/L (ref 136–145)
SPECIMEN EXP DATE BLD: NORMAL
STATUS OF UNIT,%ST: NORMAL
UNIT DIVISION, %UDIV: 0
WBC # BLD AUTO: 0.1 K/UL (ref 4.3–11.1)
WBC MORPH BLD: ABNORMAL

## 2019-12-04 PROCEDURE — 74011250636 HC RX REV CODE- 250/636: Performed by: INTERNAL MEDICINE

## 2019-12-04 PROCEDURE — 77030020256 HC SOL INJ NACL 0.9%  500ML

## 2019-12-04 PROCEDURE — 83615 LACTATE (LD) (LDH) ENZYME: CPT

## 2019-12-04 PROCEDURE — 83735 ASSAY OF MAGNESIUM: CPT

## 2019-12-04 PROCEDURE — 74011250636 HC RX REV CODE- 250/636: Performed by: NURSE PRACTITIONER

## 2019-12-04 PROCEDURE — 36430 TRANSFUSION BLD/BLD COMPNT: CPT

## 2019-12-04 PROCEDURE — 86644 CMV ANTIBODY: CPT

## 2019-12-04 PROCEDURE — 74011250637 HC RX REV CODE- 250/637: Performed by: NURSE PRACTITIONER

## 2019-12-04 PROCEDURE — 80053 COMPREHEN METABOLIC PANEL: CPT

## 2019-12-04 PROCEDURE — 65270000015 HC RM PRIVATE ONCOLOGY

## 2019-12-04 PROCEDURE — 74011250637 HC RX REV CODE- 250/637: Performed by: INTERNAL MEDICINE

## 2019-12-04 PROCEDURE — P9037 PLATE PHERES LEUKOREDU IRRAD: HCPCS

## 2019-12-04 PROCEDURE — 84100 ASSAY OF PHOSPHORUS: CPT

## 2019-12-04 PROCEDURE — 99233 SBSQ HOSP IP/OBS HIGH 50: CPT | Performed by: INTERNAL MEDICINE

## 2019-12-04 PROCEDURE — 85025 COMPLETE CBC W/AUTO DIFF WBC: CPT

## 2019-12-04 PROCEDURE — 74011000258 HC RX REV CODE- 258: Performed by: INTERNAL MEDICINE

## 2019-12-04 PROCEDURE — 36591 DRAW BLOOD OFF VENOUS DEVICE: CPT

## 2019-12-04 PROCEDURE — 65270000029 HC RM PRIVATE

## 2019-12-04 RX ORDER — DRONABINOL 2.5 MG/1
2.5 CAPSULE ORAL
Status: DISCONTINUED | OUTPATIENT
Start: 2019-12-04 | End: 2019-12-10

## 2019-12-04 RX ADMIN — PIPERACILLIN AND TAZOBACTAM 3.38 G: 3; .375 INJECTION, POWDER, FOR SOLUTION INTRAVENOUS at 06:05

## 2019-12-04 RX ADMIN — ONDANSETRON 4 MG: 2 INJECTION INTRAMUSCULAR; INTRAVENOUS at 07:40

## 2019-12-04 RX ADMIN — ALLOPURINOL 300 MG: 300 TABLET ORAL at 09:47

## 2019-12-04 RX ADMIN — LORATADINE 10 MG: 10 TABLET ORAL at 09:48

## 2019-12-04 RX ADMIN — VORICONAZOLE 200 MG: 200 TABLET, FILM COATED ORAL at 21:24

## 2019-12-04 RX ADMIN — PIPERACILLIN AND TAZOBACTAM 3.38 G: 3; .375 INJECTION, POWDER, FOR SOLUTION INTRAVENOUS at 21:30

## 2019-12-04 RX ADMIN — CAMPHOR AND MENTHOL: 5; 5 LOTION TOPICAL at 16:00

## 2019-12-04 RX ADMIN — ACETAMINOPHEN 650 MG: 325 TABLET, FILM COATED ORAL at 13:56

## 2019-12-04 RX ADMIN — VANCOMYCIN HYDROCHLORIDE 1250 MG: 10 INJECTION, POWDER, LYOPHILIZED, FOR SOLUTION INTRAVENOUS at 08:48

## 2019-12-04 RX ADMIN — DRONABINOL 2.5 MG: 2.5 CAPSULE ORAL at 09:47

## 2019-12-04 RX ADMIN — ONDANSETRON 4 MG: 2 INJECTION INTRAMUSCULAR; INTRAVENOUS at 17:38

## 2019-12-04 RX ADMIN — VORICONAZOLE 200 MG: 200 TABLET, FILM COATED ORAL at 09:48

## 2019-12-04 RX ADMIN — PRAVASTATIN SODIUM 10 MG: 20 TABLET ORAL at 21:24

## 2019-12-04 RX ADMIN — TBO-FILGRASTIM 480 MCG: 480 INJECTION, SOLUTION SUBCUTANEOUS at 21:26

## 2019-12-04 RX ADMIN — ACYCLOVIR 400 MG: 800 TABLET ORAL at 17:39

## 2019-12-04 RX ADMIN — DULOXETINE 30 MG: 30 CAPSULE, DELAYED RELEASE ORAL at 09:48

## 2019-12-04 RX ADMIN — CAMPHOR AND MENTHOL: 5; 5 LOTION TOPICAL at 09:55

## 2019-12-04 RX ADMIN — ACYCLOVIR 400 MG: 800 TABLET ORAL at 09:46

## 2019-12-04 RX ADMIN — LORAZEPAM 1 MG: 1 TABLET ORAL at 21:24

## 2019-12-04 RX ADMIN — POTASSIUM CHLORIDE 20 MEQ: 20 TABLET, EXTENDED RELEASE ORAL at 17:39

## 2019-12-04 RX ADMIN — DRONABINOL 2.5 MG: 2.5 CAPSULE ORAL at 16:30

## 2019-12-04 RX ADMIN — TEMAZEPAM 15 MG: 15 CAPSULE ORAL at 21:25

## 2019-12-04 RX ADMIN — DIPHENHYDRAMINE HYDROCHLORIDE 25 MG: 25 CAPSULE ORAL at 13:56

## 2019-12-04 RX ADMIN — PIPERACILLIN AND TAZOBACTAM 3.38 G: 3; .375 INJECTION, POWDER, FOR SOLUTION INTRAVENOUS at 16:09

## 2019-12-04 RX ADMIN — Medication 2 TABLET: at 09:47

## 2019-12-04 RX ADMIN — POTASSIUM CHLORIDE 20 MEQ: 20 TABLET, EXTENDED RELEASE ORAL at 09:46

## 2019-12-04 RX ADMIN — CAMPHOR AND MENTHOL: 5; 5 LOTION TOPICAL at 21:28

## 2019-12-04 NOTE — PROGRESS NOTES
END OF SHIFT NOTE: 
 
Intake/Output 12/04 0701 - 12/04 1900 In: 2038 [P.O.:854; I.V.:921] Out: 300 [Urine:300] Voiding: Yes Catheter:  No  
Drain:   
 
 
 
 
 
Stool:  No  occurrences. Stool Assessment Stool Color: Steve Mccoy (12/03/19 2303) Stool Appearance: Watery (12/03/19 2303) Stool Amount: Medium (12/03/19 2303) Stool Source/Status: Rectum (12/03/19 2303) Emesis:  No  occurrences. VITAL SIGNS Patient Vitals for the past 12 hrs: 
 Temp Pulse Resp BP SpO2  
12/04/19 1600 99.7 °F (37.6 °C) 94 18 135/51 93 % 12/04/19 1358 98.2 °F (36.8 °C) (!) 107 18 126/56 93 % 12/04/19 1132 98.2 °F (36.8 °C) (!) 102 20 131/62 92 % 12/04/19 0739     95 % 12/04/19 0733 99.5 °F (37.5 °C) (!) 108  149/61 90 % Pain Assessment Pain 1 Pain Scale 1: Visual (12/04/19 1352) Pain Intensity 1: 0 (12/04/19 1352) Patient Stated Pain Goal: 0 (12/03/19 0720) Pain Reassessment 1: Patient resting w/respiratory rate greater than 10 (12/01/19 0340) Pain Onset 1: Upon awakening (12/01/19 0242) Pain Location 1: Head (12/01/19 0242) Pain Orientation 1: Lower (12/01/19 0242) Pain Description 1: Aching (12/01/19 0242) Pain Intervention(s) 1: Medication (see MAR) (12/01/19 0242) Ambulating Yes in room Additional Information: Day +27 FLAG-VENITA  Nasal cannula in place @  3 liters,  desaturation  88- 89 %  RA. Patient took in Supplements Ensure and Yogurt this shift. Pre- med with Zofan 4 mg IV before breakfast and lunch. Patient  decline Zofran before supper but request after supper. no emesis produced. Patient receive Platelet transfusion without any reaction. Patient's   present @ bedside. Highest Temperature 99.7 oral. No stools noted. Shift report given to oncoming nurse Sachin Duvall RN  at the bedside.  
 
Enrike De Guzman RN

## 2019-12-04 NOTE — PROGRESS NOTES
Infectious Disease Progress Note Today's Date: 2019 Admit Date: 2019 Impression: · E faecalis bacteremia/Alpha strep (); repeat blood cx () NG ; TTE (-); source Port vs GI 
· AML; admitted for salvage FLAG-VENITA · Febrile neutropenia · Pancytopenia · Diarrhea; chronic Plan:  
· Continue vancomycin and zosyn. · Stool studies pending. · CMV quant is pending. · Blood and urine cultures from 19 NGTD. · ID will continue to follow. Anti-infectives: · Trinda Gabbie · ACV 
· IV Vancomycin (-) · IV Cefepime (-) · PO LVQ (-) · IV Zosyn (- Subjective: Interval History: Tmax 101.2. . Reports diarrhea \"about the same,\" nausea without vomiting, rigors in the afternoons, and some dyspnea noticed by her caregivers - although she denies. Denies other complaints. Very pleasant. No Known Allergies Review of Systems:  A comprehensive review of systems was negative except for that written in the History of Present Illness. Objective:  
 
Visit Vitals /62 (BP 1 Location: Right arm) Pulse (!) 102 Temp 98.2 °F (36.8 °C) Resp 20 Ht 5' 6\" (1.676 m) Wt 83.6 kg (184 lb 6.4 oz) SpO2 92% BMI 29.76 kg/m² Temp (24hrs), Av.3 °F (37.4 °C), Min:98.2 °F (36.8 °C), Max:101.8 °F (38.8 °C) Lines:  Left chest port:    
 
Physical Exam:   
General: Alert, no acute distress, appears stated age, well nourished and well developed, appears chronically ill Head:    Normocephalic, atraumatic, alopecia Eyes:   Anicteric sclerae, no drainage, not injected, EOMI Mouth:  Moist mucosa, op clear Neck:   Supple, symmetrical, trachea midline, no JVD Lungs:   Clear without audible wheezes; O2 via NC 
CV:   Regular rate and rhythm without audible murmur Abdomen:  Soft, non tender, not distended, active bowel sounds Extremities:  No cyanosis or edema Pulses:  2+ DP bilaterally Musculoskeletal: Moves all extremities with equal strength, no deformity Skin:   No acute rash or lesions, color and texture normal 
Psych:   Alert, oriented and appropriate without evidence of thought disorder Lines:    benign Data Review: CBC: 
Recent Labs 12/04/19 0401 12/03/19 0300 12/02/19 
0304 WBC 0.1* 0.0* 0.0* GRANS  --   --  50 MONOS  --   --  0* EOS  --   --  0* ANEU  --   --  0.0* ABL  --   --  0.0* HGB 8.2* 7.0* 7.1*  
HCT 24.1* 21.0* 21.3*  
PLT 10* 19* 32* BMP: 
Recent Labs 12/04/19 0401 12/03/19 0300 12/02/19 
0304 CREA 0.53* 0.54* 0.54* BUN 11 10 11  141 142  
K 4.0 3.9 3.8 * 109* 110* CO2 25 26 26 AGAP 5* 6* 6*  
GLU 97 99 93 LFTS: 
Recent Labs 12/04/19 
0401 12/03/19 
0300 12/02/19 
0304 TBILI 0.4 0.4 0.3 ALT 25 26 30 SGOT 16 17 18 AP 80 79 78 TP 6.1* 6.0* 5.8* ALB 2.0* 2.0* 1.9* Microbiology:  
 
All Micro Results Procedure Component Value Units Date/Time CULTURE, BLOOD [[id number]] Collected:  12/01/19 2043 Order Status:  Completed Specimen:  Blood Updated:  12/04/19 6678 Special Requests: --     
  RIGHT Antecubital 
  
  Culture result: NO GROWTH 3 DAYS     
 CULTURE, BLOOD [[id number]] Collected:  12/01/19 2003 Order Status:  Completed Specimen:  Blood Updated:  12/04/19 4544 Special Requests: MEDIAL PORT Culture result: NO GROWTH 3 DAYS     
 CULTURE, URINE [[id number]] Collected:  12/01/19 2058 Order Status:  Completed Specimen:  Urine from Clean catch Updated:  12/04/19 5604 Special Requests: NO SPECIAL REQUESTS Culture result: NO GROWTH 2 DAYS     
 CMV BY PCR, QT [[id number]] Collected:  12/04/19 0401 Order Status:  Completed Specimen:  Blood Updated:  12/04/19 0424 C. DIFFICILE AG & TOXIN A/B [[id number]] Order Status:  Sent Specimen:  [facility] [[id number]] Collected:  11/22/19 9184 Order Status:  Completed Specimen:  Stool Updated:  11/27/19 1236 Source STOOL Ova & Parasite exam Final Report Below Comment: (NOTE) These results were obtained using wet preparation(s) and trichrome 
stained smear. This test does not include testing for Cryptosporidium parvum, Cyclospora, or Microsporidia. Performed At: [address], [address] [facility] Zenaida Moreno MD BECERRA:[id number] C. DIFFICILE AG & TOXIN A/B [[id number]] Collected:  11/27/19 0525 Order Status:  Completed Specimen:  Stool Updated:  11/27/19 1233 [address] Toyah ANTIGEN    
  C. DIFFICILE GDH ANTIGEN-NEGATIVE  
     
  C. difficile toxin C. DIFFICILE TOXIN-NEGATIVE  
     
  PCR Reflex NOT APPLICABLE INTERPRETATION    
  NEGATIVE FOR TOXIGENIC C. DIFFICILE Clinical Consideration NEGATIVE FOR TOXIGENIC C. DIFFICILE  
     
 CULTURE, BLOOD [[id number]] Collected:  11/21/19 0106 Order Status:  Completed Specimen:  Blood Updated:  11/26/19 7900 Special Requests: --     
  RIGHT 
HAND Culture result: NO GROWTH 5 DAYS     
 CULTURE, BLOOD [[id number]] Collected:  11/20/19 2028 Order Status:  Completed Specimen:  Blood Updated:  11/25/19 6886 Special Requests: PORT Culture result: NO GROWTH 5 DAYS     
 CULTURE, STOOL [[id number]] Collected:  11/22/19 1170 Order Status:  Completed Specimen:  Stool Updated:  11/24/19 1623 Special Requests: NO SPECIAL REQUESTS Culture result:    
  No Salmonella, Shigella, or Ecoli 0157 isolated. NO GROWTH OF GRAM NEGATIVE FECAL REGGIE,  
     
 CULTURE, BLOOD [[id number]] Collected:  11/18/19 2032 Order Status:  Completed Specimen:  Blood Updated:  11/23/19 9228 Special Requests: --     
  RIGHT 
HAND Culture result: NO GROWTH 5 DAYS     
 CULTURE, BLOOD [[id number]]  (Abnormal)  (Susceptibility) Collected:  11/18/19 1955 Order Status:  Completed Specimen:  Blood Updated:  11/22/19 0730 Special Requests: LATERAL PORT     
  GRAM STAIN GRAM POS COCCI IN CHAINS AEROBIC AND ANAEROBIC BOTTLES RESULTS VERIFIED, PHONED TO AND READ BACK BY EJ CORREA RN @ 3714 ON 11/19/2019 BY AMM Culture result:    
  ENTEROCOCCUS FAECALIS GROUP D ALPHA STREPTOCOCCUS, NOT S. PNEUMONIAE THIS ORGANISM MAY BE INDICATIVE OF CULTURE CONTAMINATION, HOWEVER, CLINICAL CORRELATION NEEDS TO BE EVALUATED, AS EACH CASE IS UNIQUE. REFER TO Christopher Muller Dr PANEL [id number] CULTURE, URINE [[id number]] Collected:  11/19/19 0102 Order Status:  Completed Specimen:  Urine from Clean catch Updated:  11/21/19 0710 Special Requests: NO SPECIAL REQUESTS Culture result: NO GROWTH 2 DAYS     
 BLOOD CULTURE ID PANEL [[id number]]  (Abnormal) Collected:  11/18/19 1955 Order Status:  Completed Specimen:  Blood Updated:  11/19/19 1420 Acc. no. from [facility] [id number] Enterococcus DETECTED Streptococcus DETECTED Comment: RESULTS VERIFIED, PHONED TO AND READ BACK BY 
EJ CORREA RN @ 2338 ON 11/19/2019 BY WILLOW ALMODOVAR/ZARIA (Vancomycin Resistance Gene) NOT DETECTED Clinical Consideration Multiple organisms detected. Results do not replace susceptibility testing. Dustin Cortez [[id number]] Collected:  11/19/19 0415 Order Status:  Canceled Specimen:  Stool Imaging:  
Reviewed and none new CXR 12/1/19 IMPRESSION: No acute cardiopulmonary abnormality. No infiltrate appreciated. Signed By: Araceli Castillo NP December 4, 2019

## 2019-12-04 NOTE — PROGRESS NOTES
3567 Critical CBC readback/addressed per SOP's. EOS: T max 101.8 resolved with Tylenol. Last cultures obtained 12/1 NGTD. Pt on Vanc and Zosyn. Stool specimen obtained and pt given one imodium. Pt wishes to be disconnected from [facility] IVF's when abx not infusing so that she may start ambulating in the halls again to build up her strength. Discontinued O2 with RA sats in the 90's again. Plt ct 10k, plts orderd for transfusion today. Continue with POC. D+27 s/p Hsreya FLAG. Report to oncoming KAM.

## 2019-12-04 NOTE — PROGRESS NOTES
[facility] Inpatient Hematology / Oncology Daily Progress Note Reason for Admission:  Admission for antineoplastic chemotherapy [Z51.11] 24 Hour Events: 
Tmax 101.8, VSS, on O2 @ 2L Day 27 FLAG-VENITA WBC up to 100 Awaiting count recovery, on Granix  at bedside ROS: 
Constitutional: +fatigue +fever +malaise CV: Negative for chest pain, palpitations, edema. Respiratory: Negative for dyspnea, cough, wheezing. GI: Negative for nausea, abdominal pain. +Diarrhea- improved. 10 point review of systems is otherwise negative with the exception of the elements mentioned above in the HPI. No Known Allergies Past Medical History:  
Diagnosis Date  AML (acute myeloid leukemia) ([facility] [address] dx- 4/2019  
 followed by dr Earnestine Healy  Depression  Hypercholesterolemia  Infection   
 of port -- was placed 5/2019-- removed 6/2019---right chest  
 Psychiatric disorder   
 aniexty  Sleep apnea Past Surgical History:  
Procedure Laterality Date  HX OTHER SURGICAL    
 colonoscopy  HX VASCULAR ACCESS    
 IR INSERT TUNL CVC W PORT OVER 5 YEARS  4/30/2019  IR INSERT TUNL CVC W PORT OVER 5 YEARS  7/15/2019  IR REMOVE TUNL CVAD W PORT/PUMP  6/13/2019 Family History Problem Relation Age of Onset  Cancer Father Social History Socioeconomic History  Marital status:  Spouse name: Not on file  Number of children: Not on file  Years of education: Not on file  Highest education level: Not on file Occupational History  Not on file Social Needs  Financial resource strain: Not on file  Food insecurity:  
  Worry: Not on file Inability: Not on file  Transportation needs:  
  Medical: Not on file Non-medical: Not on file Tobacco Use  Smoking status: Never Smoker  Smokeless tobacco: Never Used Substance and Sexual Activity  Alcohol use: Never Frequency: Never  Drug use: Never  Sexual activity: Not on file Lifestyle  Physical activity:  
  Days per week: Not on file Minutes per session: Not on file  Stress: Not on file Relationships  Social connections:  
  Talks on phone: Not on file Gets together: Not on file Attends Baptism service: Not on file Active member of club or organization: Not on file Attends meetings of clubs or organizations: Not on file Relationship status: Not on file  Intimate partner violence:  
  Fear of current or ex partner: Not on file Emotionally abused: Not on file Physically abused: Not on file Forced sexual activity: Not on file Other Topics Concern [address] Service Not Asked  Blood Transfusions Not Asked  Caffeine Concern Not Asked  Occupational Exposure Not Asked Lavenia Levels Hazards Not Asked  Sleep Concern Not Asked  Stress Concern Not Asked  Weight Concern Not Asked  Special Diet Not Asked  Back Care Not Asked  Exercise Not Asked  Bike Helmet Not Asked  Seat Belt Not Asked  Self-Exams Not Asked Social History Narrative  Not on file Current Facility-Administered Medications Medication Dose Route Frequency Provider Last Rate Last Dose  [START ON 12/5/2019] Vancomycin Trough Level Reminder   Other Daylinjovanna Forte MD      
 dronabinol (MARINOL) capsule 2.5 mg  2.5 mg Oral ACB&D Dianne Harris NP      
 0.9% sodium chloride infusion 250 mL  250 mL IntraVENous PRN Sasha Graf MD      
 vancomycin (VANCOCIN) 1250 mg in  ml infusion  1,250 mg IntraVENous Q12H Isa Teresa .7 mL/hr at 12/04/19 0848 1,250 mg at 12/04/19 0848  
 0.9% sodium chloride infusion 250 mL  250 mL IntraVENous PRN Sasha Graf MD      
 acetaminophen/diphenhydrAMINE (TYLENOL PM EXT STR) 500/25 mg (Patient Supplied)   Oral QHS PRN Rene Valerio NP      
 temazepam (RESTORIL) capsule 15 mg  15 mg Oral QHS Serenity Thorpe NP   15 mg at 12/03/19 1089  camphor-menthol (SARNA) 0.5-0.5 % lotion   Topical TID Codie Pereyra, NP      
 piperacillin-tazobactam (ZOSYN) 3.375 g in 0.9% sodium chloride (MBP/ADV) 100 mL  3.375 g IntraVENous Q8H Jessica Campbell MD 25 mL/hr at 12/04/19 0605 3.375 g at 12/04/19 5449  baclofen (LIORESAL) tablet 5 mg  5 mg Oral Q8H PRN Riki Allen MD   5 mg at 11/21/19 1335  potassium chloride (K-DUR, KLOR-CON) SR tablet 20 mEq  20 mEq Oral BID Riki Allen MD   20 mEq at 12/04/19 8633  albuterol (PROVENTIL VENTOLIN) nebulizer solution 2.5 mg  2.5 mg Nebulization Q6H PRN Christine Jay NP   2.5 mg at 11/21/19 1600  
 alum-mag hydroxide-simeth (MYLANTA) oral suspension 30 mL  30 mL Oral Q4H PRN Riki Allen MD   30 mL at 11/30/19 5149  loperamide (IMODIUM) capsule 2 mg  2 mg Oral Q4H PRN Riki Allen MD   2 mg at 12/03/19 2306  loratadine (CLARITIN) tablet 10 mg  10 mg Oral DAILY Oscar Ortiz, NP   10 mg at 12/04/19 6433  central line flush (saline) syringe 10 mL  10 mL InterCATHeter PRN Riki Allen MD   10 mL at 11/28/19 2109  
 docusate sodium (COLACE) capsule 100 mg  100 mg Oral BID PRN Colon Grapes, NP   100 mg at 11/11/19 1308  LORazepam (ATIVAN) injection 0.5 mg  0.5 mg IntraVENous Q6H PRN Riki Allen MD   0.5 mg at 11/27/19 1201  
 saline peripheral flush soln 10 mL  10 mL InterCATHeter PRN Riki Allen MD   10 mL at 11/23/19 2229  
 heparin (porcine) pf 300-500 Units  300-500 Units InterCATHeter PRN Riki Allen MD   300 Units at 12/01/19 0245  tbo-filgrastim (GRANIX) injection 480 mcg  480 mcg SubCUTAneous QHS Riki Allen MD   480 mcg at 12/03/19 2237  
 acyclovir (ZOVIRAX) tablet 400 mg  400 mg Oral BID Colon Grapes, NP   400 mg at 12/04/19 2946  allopurinol (ZYLOPRIM) tablet 300 mg  300 mg Oral DAILY Colon Grapes, NP   300 mg at 12/04/19 3880  cholecalciferol (VITAMIN D3) (400 Units /10 mcg) tablet 2 Tab  800 Units Oral DAILY Colon Grapes, NP   2 Tab at 12/04/19 1535  DULoxetine (CYMBALTA) capsule 30 mg  30 mg Oral DAILY Dalia Abraham, NP   30 mg at 19 2353  lidocaine-prilocaine (EMLA) 2.5-2.5 % cream   Topical PRN Dalia Abraham, NP      
 LORazepam (ATIVAN) tablet 1 mg  1 mg Oral QHS Dalia Abraham, NP   1 mg at 19 2237  pravastatin (PRAVACHOL) tablet 10 mg  10 mg Oral QHS Dalia Abraham, NP   10 mg at 19 2238  voriconazole (VFEND) tablet 200 mg  200 mg Oral Q12H Dalia Abraham, NP   200 mg at 19 6914  ondansetron (ZOFRAN) injection 4 mg  4 mg IntraVENous Q4H PRN Dalia Abraham, NP   4 mg at 19 0740  prochlorperazine (COMPAZINE) with saline injection 5 mg  5 mg IntraVENous Q6H PRN Dalia Abraham, NP   5 mg at 19 1614  
 HYDROcodone-acetaminophen (NORCO) 5-325 mg per tablet 1 Tab  1 Tab Oral Q6H PRN Dalia Abraham, NP   1 Tab at 19 6724  morphine injection 2 mg  2 mg IntraVENous Q4H PRN Dalia Abraham, NP      
 acetaminophen (TYLENOL) tablet 650 mg  650 mg Oral Q6H PRN Blayne Martin MD   650 mg at 19 1958  diphenhydrAMINE (BENADRYL) capsule 25 mg  25 mg Oral Q6H PRN Blayne Martin MD   25 mg at 19 1327  
 vit C,C-Sm-ottit-lutein-zeaxan (PRESERVISION AREDS-2) capsule 1 Cap (Patient Supplied)  [employer] Blayne Martin MD   1 Cap at 19 9400 OBJECTIVE: 
Patient Vitals for the past 8 hrs: 
 BP Temp Pulse Resp SpO2  
19 1132 131/62 98.2 °F (36.8 °C) (!) 102 20 92 % 19 0739     95 % 19 0733 149/61 99.5 °F (37.5 °C) (!) 108  90 % Temp (24hrs), Av.3 °F (37.4 °C), Min:98.2 °F (36.8 °C), Max:101.8 °F (38.8 °C) 701 - 1900 In: 6922 [P.O.:240; I.V.:628] Out: - Physical Exam: 
Constitutional: Well developed, frail appearing female in no acute distress, sitting comfortably in bed HEENT: Normocephalic and atraumatic. Oropharynx is clear, mucous membranes are moist. Extraocular muscles are intact. Sclerae anicteric. Skin Warm and dry. Scattered rash. No erythema. No pallor. Bruising noted on BUE/R elbow. Respiratory Lungs CTAB,  normal air exchange without accessory muscle use. On O2 via NC.  
CVS Normal rate, regular rhythm and normal S1 and S2. No murmurs, gallops, or rubs. Abdomen Soft, nontender, nondistended, normoactive bowel sounds. Neuro Grossly nonfocal with no obvious sensory or motor deficits. MSK Normal range of motion in general.  No edema and no tenderness. Psych Appropriate mood and affect. Labs:   
Recent Results (from the past 24 hour(s)) METABOLIC PANEL, COMPREHENSIVE Collection Time: 12/04/19  4:01 AM  
Result Value Ref Range Sodium 141 136 - 145 mmol/L Potassium 4.0 3.5 - 5.1 mmol/L Chloride 111 (H) 98 - 107 mmol/L  
 CO2 25 21 - 32 mmol/L Anion gap 5 (L) 7 - 16 mmol/L Glucose 97 65 - 100 mg/dL BUN 11 8 - 23 MG/DL Creatinine 0.53 (L) 0.6 - 1.0 MG/DL  
 GFR est AA >60 >60 ml/min/1.73m2 GFR est non-AA >60 >60 ml/min/1.73m2 Calcium 7.9 (L) 8.3 - 10.4 MG/DL Bilirubin, total 0.4 0.2 - 1.1 MG/DL  
 ALT (SGPT) 25 12 - 65 U/L  
 AST (SGOT) 16 15 - 37 U/L Alk. phosphatase 80 50 - 136 U/L Protein, total 6.1 (L) 6.3 - 8.2 g/dL Albumin 2.0 (L) 3.2 - 4.6 g/dL Globulin 4.1 (H) 2.3 - 3.5 g/dL A-G Ratio 0.5 (L) 1.2 - 3.5 MAGNESIUM Collection Time: 12/04/19  4:01 AM  
Result Value Ref Range Magnesium 1.8 1.8 - 2.4 mg/dL LD Collection Time: 12/04/19  4:01 AM  
Result Value Ref Range  110 - 210 U/L  
PHOSPHORUS Collection Time: 12/04/19  4:01 AM  
Result Value Ref Range Phosphorus 2.3 2.3 - 3.7 MG/DL  
CBC WITH AUTOMATED DIFF Collection Time: 12/04/19  4:01 AM  
Result Value Ref Range WBC 0.1 (LL) 4.3 - 11.1 K/uL  
 RBC 2.73 (L) 4.05 - 5.2 M/uL HGB 8.2 (L) 11.7 - 15.4 g/dL HCT 24.1 (L) 35.8 - 46.3 % MCV 88.3 79.6 - 97.8 FL  
 MCH 30.0 26.1 - 32.9 PG  
 MCHC 34.0 31.4 - 35.0 g/dL  
 RDW 13.3 11.9 - 14.6 % PLATELET 10 (LL) 583 - 450 K/uL MPV 10.7 9.4 - 12.3 FL ABSOLUTE NRBC 0.00 0.0 - 0.2 K/uL  
 RBC COMMENTS NORMOCYTIC/NORMOCHROMIC    
 WBC COMMENTS WBC TOO FEW TO DIFFERENTIATE PLATELET COMMENTS MARKED    
 DF MANUAL PLATELETS, ALLOCATE Collection Time: 12/04/19  5:45 AM  
Result Value Ref Range Unit number [id number] Blood component type PLTPH,LRIR,1 Unit division 00 Status of unit ALLOCATED Imaging: 
CT HEAD WO CONT [[id number]] Collected: 11/19/19 1050 Order Status: Completed Updated: 11/19/19 1054 Narrative:    
CT HEAD WITHOUT CONTRAST, 11/19/2019 History: Fall last night hitting left side of head Comparison: . Technique:   5 mm axial scans from the skull base to the vertex. All CT scans 
performed at this facility use one or all of the following: Automated exposure 
control, adjustment of the mA and/or kVp according to patient's size, iterative 
reconstruction. Findings:  No evidence of intracranial hemorrhage is seen. Faint bilateral basal 
ganglia calcifications are seen. No abnormal extra-axial fluid collections are 
seen.  Mild cortical involutional changes are seen which are not felt to be 
abnormal given the patient's age. [place] evidence for acute hydrocephalus is seen. No evidence of midline shift or herniation is seen.  No abnormal edema pattern 
is seen in a vascular distribution to suggest large artery infarction. Evaluation with bone windows shows no acute osseous changes.  The visualized 
sinuses, middle ears, and mastoid air cells are well aerated. Impression:    
IMPRESSION:   
1.  No acute intracranial process evident by noncontrast CT study of the head. XR CHEST SNGL V [[id number]] Collected: 11/18/19 2041 Order Status: Completed Updated: 11/18/19 2044 Narrative:    
EXAM: XR CHEST SNGL V 
 
INDICATION: neutropenic fever COMPARISON: 9/29/2019 FINDINGS: A portable AP radiograph of the chest was obtained at 1946 hours. The 
patient is on a cardiac monitor. The lungs are clear. The cardiac and 
mediastinal contours and pulmonary vascularity are normal.  The bones and soft 
tissues are grossly within normal limits. Impression:    
IMPRESSION: Normal chest.  
XR ELBOW RT MIN 3 V [[id number]] Collected: 11/10/19 1421 Order Status: Completed Updated: 11/10/19 1424 Narrative:    
Right elbow Clinical location: Elbow pain after feeling a pop, decreased range of motion FINDINGS: Four views of the right elbow show no fracture or dislocation. There 
is no joint effusion. The soft tissues are unremarkable. Impression:    
IMPRESSION: No acute osseous abnormality or joint derangement of the right 
elbow. ASSESSMENT: 
Problem List  Date Reviewed: 10/31/2019 Codes Class Noted Febrile neutropenia (HCC) ICD-10-CM: D70.9, R50.81 ICD-9-CM: 288.00, 780.61  9/21/2019 Pancytopenia due to antineoplastic chemotherapy [facility]) ICD-10-CM: D61.810, T45.1X5A 
ICD-9-CM: 284.11, E933.1  6/12/2019 Cellulitis of neck ICD-10-CM: U63.454 ICD-9-CM: 682.1  6/12/2019 Immunocompromised status associated with infection ICD-10-CM: B99.9 ICD-9-CM: 136.9  6/12/2019 Port or reservoir infection ICD-10-CM: L59.440J ICD-9-CM: 999.33  6/12/2019 [facility] ICD-10-CM: C92.00 ICD-9-CM: 205.00  5/9/2019 Admission for antineoplastic chemotherapy ICD-10-CM: Z51.11 ICD-9-CM: V58.11  5/5/2019 AML (acute myeloblastic leukemia) ([facility] [address] ICD-10-CM: C92.00 ICD-9-CM: 205.00  4/28/2019 Weakness generalized ICD-10-CM: R53.1 ICD-9-CM: 780.79  4/28/2019 Pancytopenia ([facility] [address] ICD-10-CM: L88.622 ICD-9-CM: 284.19  4/28/2019 Thrombocytopenia ([address] ICD-10-CM: D69.6 ICD-9-CM: 287.5  4/27/2019 Ms. Clarice Orantes is a 68 y.o. female admitted on 11/7/2019.   She is a known patient of Dr. 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 She failed induction with 7+3/Midostaurin. On Dacogen/Gilteritinib with recent BMbx with persistent residual disease. She is being admitted for FLAG-VENITA. She is feeling well and is ready to proceed with treatment. PLAN: 
AML 
- admit for salvage FLAG-VENITA 
- needs Echo prior - ordered 11/8 Day 1 FLAG-VENITA. Echo with EF 55-60%, proceed with tx. 
11/9 Day 2 FLAG-VENITA. Tolerating well, no issues. Uric acid 3.1 today. 11/10 Day 3 FLAG-VENITA. No issues with treatment. Uric acid 3.3 today. 11/12 Day 5 FLAG-VENITA. Tolerating treatment well. G-CSF to start tomorrow. 11/13 Day 6 FLAG-VENITA, doing well, Granix/claritin starts today 12/4 Day 27 FLAG VENITA. Awaiting count recovery, con't Granix. WBC up to 100 today. Pancytopenia secondary to disease - Transfuse prn per Alexander SOPs R elbow pain 11/11 c/o R elbow pain with limited ROM yesterday. Xray neg. Pain improved today, noted bruising. Normal ROM on exam. 
 
Mild Abd discomfort/loose stools 11/13 discomfort is improved from yesterday, will monitor 11/14 loose stools, but not watery, can use Imodium prn 
11/15 Imodium working well  
11/16 controlled 11/26 Ova and parasite 11/22 pending. Check for C diff if stool appropriate. Continue anti diarrheal for now Neutropenic fever / Sepsis not present on admission / UTI / Strep/Enterococcus bacteremia 11/19 Tmax 102. 1.  UA +UTI. UCx pending. BCx-NGTD. CXR neg. On Cef/Vanc. DC prophylactic LVQ. 11/20 Tmax 102. BCx positive for streptococcus/enterococcus. Subculture in progress. On Cef/Vanc.   
11/22 repeat BC NTD. Alpha strep on vanc. 11/24 Repeat BC NTD. No fever. 11/25 new skin rash. ?RT to vanc. Consult ID for recommendations. 11/26 ID dc'd cefe and vanc. Started zosyn. TTE ordered. 12/2 Tmax 101. CXR neg.  UA neg. Repeat cultures. On Zosyn, restarted Vanc last PM. 12/3 Tmax 102. Cultures NGTD. Con't Zosyn/Vanc. 12/4 Tmax 101.8.  ?r/t marrow recovery? UCx-NG. BCx-NGTD. Con't Vanc/Zosyn. ID following - checking stool for giardia/cryptospordium and repeating CMV. Fall 
11/19 s/p last PM.  No LOC. Hit head. CT head neg. Double vision 11/21 Neuro has seen patient. Concerns for possible 6th nerve palsy. Recommends LP. We will hold with WBC 0.0 and continue to monitor. 11/22 vision improved today. MRI brain pending. 11/23 MRI unremarkable. Discussed with Neuro. No need for LP 
11/27 Resolved Continue home meds Prophylactic Antibx: Acyclovir, Voriconazole Mily SOPs SCDs for DVT prophylaxis (AC contraindicated d/t thrombocytopenia) Goals and plan of care reviewed with the patient. All questions answered to the best of our ability. Disposition: Day 27 FLAG-VENITA. WBC up to 100. Awaiting count recovery, con't Granix. If no count recovery by end of week, plan for repeat BMbx. Transfusions per mily SOPs. Upon discharge will need twice weekly labs w transfusions as needed. Weekly provider visits. RTC within 1 week from discharge. Jaswant Durbin NP [facility] Hematology & Oncology [address] [place] [address] Office : [phone] Fax : [phone]

## 2019-12-04 NOTE — PROGRESS NOTES
Chart reviewed for discharge planning . Pt is still actively in Count Recovery Day 26. Discharge undermined base on pending on count recovery. Case management will continue to follow. Please consult  if any new issues arise.

## 2019-12-04 NOTE — PROGRESS NOTES
Nutrition follow-up Reason for initial assessment: Length of Stay  
  
Assessment:  
Food/Nutrition Patient History:  Patient with PMH of AML and hypercholesterolemia admitted for chemo. She is day 25 FLAG-VENITA. On Granix, awaiting count recovery. Patient seen at request of PDA for supplement preference. Patient states that she was confused if she was supposed to be drinking Ensure Clear or Ensure Enlive. She states that the MD had recommended Ensure Clear due to Ensure Enlive chocolate \"running straight through me. \" When asked if Ensure Enlive vanilla had the same effect, she states that she had not paid attention. She did try Ensure Clear and would prefer to continue Ensure Enlive for both taste and nutritional value. She states that she has continued with mostly only yogurt TID and Ensure Enlive TID as primary intake. She is asking if that is enough to sustain her. She states that her primary barrier to PO intake remains poor appetite. She is frustrated that she has not been able to eat like she was on previous admissions.  
  
DIET REGULAR   
DIET Nutrition Supplements Ensure Enlive with breakfast, lunch, dinner 
  
Anthropometrics:Height: 5' 6\" (167.6 cm),  Weight: 87.1 kg (192 lb), Weight Source: Standing scale (comment), Body mass index is 30.99 kg/m². BMI class of obese Macronutrient needs: 87.1 kg Listed body weight QPX:  1133-0090 VBZT /day (18-20 kcal/kg) JKJ:  06-77 PHZWC protein/day (20% kcal) 
  Intake/Comparative Standards: Yogurt TID and Ensure Enlive TID providing ~1320 kcal (~85% estimated kcal needs) and ~78 g pro (100% estimated protein needs). 
  
Nutrition Diagnosis:  
Inadequate oral intake related to poor appetite as evidenced by patient reported barrier to PO intake, decline in intake meeting ~85% estimated energy needs and ~70% of estimated protein needs. 
  
Intervention: 
Meals and snacks: Continue current diet. Education: Explained that [employer] with yogurt TID is likely meeting her protein needs, but is still not meeting her energy needs. Recommended that she consume small amounts of other foods that she is able to tolerate to help meet energy needs. Provided examples of foods that would likley be well tolerated considering what she is currently tolerating. Encouraged her to continue to try to eat different foods. Let her know that she is doing what she can and that every little bit is progress. Nutrition Supplement Therapy: Continue Ensure Enlive TID Coordination of Care: Hamilton Orozco RN Discharge Nutrition Plan: Too soon to determine. [address], [address] [address] [facility]

## 2019-12-05 ENCOUNTER — APPOINTMENT (OUTPATIENT)
Dept: GENERAL RADIOLOGY | Age: 74
DRG: 837 | End: 2019-12-05
Attending: NURSE PRACTITIONER
Payer: MEDICARE

## 2019-12-05 LAB
ALBUMIN SERPL-MCNC: 2 G/DL (ref 3.2–4.6)
ALBUMIN/GLOB SERPL: 0.5 {RATIO} (ref 1.2–3.5)
ALP SERPL-CCNC: 76 U/L (ref 50–136)
ALT SERPL-CCNC: 22 U/L (ref 12–65)
ANION GAP SERPL CALC-SCNC: 6 MMOL/L (ref 7–16)
AST SERPL-CCNC: 13 U/L (ref 15–37)
BILIRUB SERPL-MCNC: 0.4 MG/DL (ref 0.2–1.1)
BLD PROD TYP BPU: NORMAL
BNP SERPL-MCNC: 405 PG/ML (ref 5–125)
BPU ID: NORMAL
BUN SERPL-MCNC: 12 MG/DL (ref 8–23)
CALCIUM SERPL-MCNC: 8.5 MG/DL (ref 8.3–10.4)
CHLORIDE SERPL-SCNC: 109 MMOL/L (ref 98–107)
CO2 SERPL-SCNC: 27 MMOL/L (ref 21–32)
CREAT SERPL-MCNC: 0.59 MG/DL (ref 0.6–1)
DIFFERENTIAL METHOD BLD: ABNORMAL
ERYTHROCYTE [DISTWIDTH] IN BLOOD BY AUTOMATED COUNT: 13.2 % (ref 11.9–14.6)
GLOBULIN SER CALC-MCNC: 3.9 G/DL (ref 2.3–3.5)
GLUCOSE SERPL-MCNC: 108 MG/DL (ref 65–100)
HCT VFR BLD AUTO: 22.4 % (ref 35.8–46.3)
HGB BLD-MCNC: 7.5 G/DL (ref 11.7–15.4)
LDH SERPL L TO P-CCNC: 160 U/L (ref 110–210)
MAGNESIUM SERPL-MCNC: 1.7 MG/DL (ref 1.8–2.4)
MCH RBC QN AUTO: 29.8 PG (ref 26.1–32.9)
MCHC RBC AUTO-ENTMCNC: 33.5 G/DL (ref 31.4–35)
MCV RBC AUTO: 88.9 FL (ref 79.6–97.8)
NRBC # BLD: 0 K/UL (ref 0–0.2)
PHOSPHATE SERPL-MCNC: 3.1 MG/DL (ref 2.3–3.7)
PLATELET # BLD AUTO: 34 K/UL (ref 150–450)
PLATELET COMMENTS,PCOM: ABNORMAL
PMV BLD AUTO: 9.8 FL (ref 9.4–12.3)
POTASSIUM SERPL-SCNC: 3.7 MMOL/L (ref 3.5–5.1)
PROT SERPL-MCNC: 5.9 G/DL (ref 6.3–8.2)
RBC # BLD AUTO: 2.52 M/UL (ref 4.05–5.2)
RBC MORPH BLD: ABNORMAL
SODIUM SERPL-SCNC: 142 MMOL/L (ref 136–145)
STATUS OF UNIT,%ST: NORMAL
UNIT DIVISION, %UDIV: 0
WBC # BLD AUTO: 0.1 K/UL (ref 4.3–11.1)
WBC MORPH BLD: ABNORMAL

## 2019-12-05 PROCEDURE — 83880 ASSAY OF NATRIURETIC PEPTIDE: CPT

## 2019-12-05 PROCEDURE — 74011250636 HC RX REV CODE- 250/636: Performed by: INTERNAL MEDICINE

## 2019-12-05 PROCEDURE — 74011250637 HC RX REV CODE- 250/637: Performed by: NURSE PRACTITIONER

## 2019-12-05 PROCEDURE — 84100 ASSAY OF PHOSPHORUS: CPT

## 2019-12-05 PROCEDURE — 77030003560 HC NDL HUBR BARD -A

## 2019-12-05 PROCEDURE — 85025 COMPLETE CBC W/AUTO DIFF WBC: CPT

## 2019-12-05 PROCEDURE — 87449 NOS EACH ORGANISM AG IA: CPT

## 2019-12-05 PROCEDURE — 83615 LACTATE (LD) (LDH) ENZYME: CPT

## 2019-12-05 PROCEDURE — 80053 COMPREHEN METABOLIC PANEL: CPT

## 2019-12-05 PROCEDURE — 65270000029 HC RM PRIVATE

## 2019-12-05 PROCEDURE — 74011000258 HC RX REV CODE- 258: Performed by: INTERNAL MEDICINE

## 2019-12-05 PROCEDURE — 83735 ASSAY OF MAGNESIUM: CPT

## 2019-12-05 PROCEDURE — 71046 X-RAY EXAM CHEST 2 VIEWS: CPT

## 2019-12-05 PROCEDURE — 36591 DRAW BLOOD OFF VENOUS DEVICE: CPT

## 2019-12-05 PROCEDURE — 74011250636 HC RX REV CODE- 250/636: Performed by: NURSE PRACTITIONER

## 2019-12-05 PROCEDURE — 99233 SBSQ HOSP IP/OBS HIGH 50: CPT | Performed by: INTERNAL MEDICINE

## 2019-12-05 PROCEDURE — 65270000015 HC RM PRIVATE ONCOLOGY

## 2019-12-05 PROCEDURE — 74011250637 HC RX REV CODE- 250/637: Performed by: INTERNAL MEDICINE

## 2019-12-05 RX ORDER — FUROSEMIDE 10 MG/ML
40 INJECTION INTRAMUSCULAR; INTRAVENOUS ONCE
Status: COMPLETED | OUTPATIENT
Start: 2019-12-05 | End: 2019-12-05

## 2019-12-05 RX ORDER — LANOLIN ALCOHOL/MO/W.PET/CERES
400 CREAM (GRAM) TOPICAL 2 TIMES DAILY
Status: DISCONTINUED | OUTPATIENT
Start: 2019-12-05 | End: 2019-12-12 | Stop reason: HOSPADM

## 2019-12-05 RX ADMIN — LOPERAMIDE HYDROCHLORIDE 2 MG: 2 CAPSULE ORAL at 21:45

## 2019-12-05 RX ADMIN — ACYCLOVIR 400 MG: 800 TABLET ORAL at 17:51

## 2019-12-05 RX ADMIN — PIPERACILLIN AND TAZOBACTAM 3.38 G: 3; .375 INJECTION, POWDER, FOR SOLUTION INTRAVENOUS at 21:36

## 2019-12-05 RX ADMIN — Medication 2 TABLET: at 08:15

## 2019-12-05 RX ADMIN — FUROSEMIDE 40 MG: 10 INJECTION, SOLUTION INTRAMUSCULAR; INTRAVENOUS at 13:41

## 2019-12-05 RX ADMIN — ALLOPURINOL 300 MG: 300 TABLET ORAL at 08:16

## 2019-12-05 RX ADMIN — CAMPHOR AND MENTHOL: 5; 5 LOTION TOPICAL at 21:37

## 2019-12-05 RX ADMIN — POTASSIUM CHLORIDE 20 MEQ: 20 TABLET, EXTENDED RELEASE ORAL at 17:51

## 2019-12-05 RX ADMIN — VORICONAZOLE 200 MG: 200 TABLET, FILM COATED ORAL at 21:36

## 2019-12-05 RX ADMIN — LORAZEPAM 1 MG: 1 TABLET ORAL at 21:41

## 2019-12-05 RX ADMIN — PIPERACILLIN AND TAZOBACTAM 3.38 G: 3; .375 INJECTION, POWDER, FOR SOLUTION INTRAVENOUS at 13:41

## 2019-12-05 RX ADMIN — CAMPHOR AND MENTHOL: 5; 5 LOTION TOPICAL at 08:19

## 2019-12-05 RX ADMIN — DRONABINOL 2.5 MG: 2.5 CAPSULE ORAL at 17:51

## 2019-12-05 RX ADMIN — DRONABINOL 2.5 MG: 2.5 CAPSULE ORAL at 08:15

## 2019-12-05 RX ADMIN — ACYCLOVIR 400 MG: 800 TABLET ORAL at 08:15

## 2019-12-05 RX ADMIN — CAMPHOR AND MENTHOL: 5; 5 LOTION TOPICAL at 16:00

## 2019-12-05 RX ADMIN — DULOXETINE 30 MG: 30 CAPSULE, DELAYED RELEASE ORAL at 08:16

## 2019-12-05 RX ADMIN — PIPERACILLIN AND TAZOBACTAM 3.38 G: 3; .375 INJECTION, POWDER, FOR SOLUTION INTRAVENOUS at 06:22

## 2019-12-05 RX ADMIN — ACETAMINOPHEN 650 MG: 325 TABLET, FILM COATED ORAL at 00:22

## 2019-12-05 RX ADMIN — TBO-FILGRASTIM 480 MCG: 480 INJECTION, SOLUTION SUBCUTANEOUS at 21:36

## 2019-12-05 RX ADMIN — VORICONAZOLE 200 MG: 200 TABLET, FILM COATED ORAL at 08:16

## 2019-12-05 RX ADMIN — Medication 400 MG: at 17:51

## 2019-12-05 RX ADMIN — LOPERAMIDE HYDROCHLORIDE 2 MG: 2 CAPSULE ORAL at 06:40

## 2019-12-05 RX ADMIN — ONDANSETRON 4 MG: 2 INJECTION INTRAMUSCULAR; INTRAVENOUS at 17:57

## 2019-12-05 RX ADMIN — PRAVASTATIN SODIUM 10 MG: 20 TABLET ORAL at 21:36

## 2019-12-05 RX ADMIN — POTASSIUM CHLORIDE 20 MEQ: 20 TABLET, EXTENDED RELEASE ORAL at 08:16

## 2019-12-05 RX ADMIN — LORATADINE 10 MG: 10 TABLET ORAL at 08:15

## 2019-12-05 RX ADMIN — TEMAZEPAM 15 MG: 15 CAPSULE ORAL at 21:36

## 2019-12-05 NOTE — PROGRESS NOTES
Massage Therapy Note 20 minute gentle massage to lower legs and feet using hypoallergenic massage lotion while patient supine. Patient reported feeling better after, did not give pain scale. No follow up planned. MAC Palma

## 2019-12-05 NOTE — PROGRESS NOTES
Infectious Disease Progress Note Today's Date: 2019 Admit Date: 2019 Impression: · E faecalis/alpha strep bacteremia (); repeat blood cx () NG ; TTE (-); source Port vs GI 
· AML; admitted for salvage FLAG-VENITA · Febrile neutropenia · Pancytopenia · Chronic diarrhea Plan:  
· Still spiking fevers. Continue zosyn and follow pending studies: stool culture, CMV, fungitell. C diff negative. · Blood and urine no growth. · ID will continue to follow. Anti-infectives: · Francisco Ora · ACV 
· IV Vancomycin (-) · IV Cefepime (-) · PO LVQ (-) · IV Zosyn (- Subjective: Interval History: Still with fevers and nausea, some soft but not watery stool, 3x daily with imodium, reports chills. Denies shortness of breath. Hemoglobin 7.5. WBCs 100. Platelets 34. No Known Allergies Review of Systems:  A comprehensive review of systems was negative except for that written in the History of Present Illness. Objective:  
 
Visit Vitals /60 Pulse 94 Temp 98.7 °F (37.1 °C) Resp 16 Ht 5' 6\" (1.676 m) Wt 84.6 kg (186 lb 6.4 oz) SpO2 90% BMI 30.09 kg/m² Temp (24hrs), Av.6 °F (37.6 °C), Min:98.2 °F (36.8 °C), Max:101.9 °F (38.8 °C) Lines:  Left chest port:    
 
Physical Exam:   
General: Alert, no acute distress but appears uncomfortable, appears stated age, well nourished and well developed Head:    Normocephalic, atraumatic, alopecia Eyes:   Anicteric sclerae, no drainage, not injected, EOMI Mouth:  Moist mucosa Neck:   Supple, symmetrical, trachea midline, no JVD Lungs:   Clear without increased work of breathing or audible wheezes CV:   Regular rate and rhythm without audible murmur Abdomen:  Soft, non tender, not distended, active bowel sounds Extremities:  No cyanosis or edema Musculoskeletal: Moves all extremities with equal strength, no deformity Skin:   No acute rash or lesions, color and texture normal 
 Lymph: No palpable cervical or supraclavicular nodes Psych:   Alert, oriented and appropriate without evidence of thought disorder Lines:    benign Data Review: CBC: 
Recent Labs 12/05/19 0239 12/04/19 
0401 12/03/19 
0300 WBC 0.1* 0.1* 0.0* HGB 7.5* 8.2* 7.0*  
HCT 22.4* 24.1* 21.0*  
PLT 34* 10* 19* BMP: 
Recent Labs 12/05/19 0239 12/04/19 
0401 12/03/19 
0300 CREA 0.59* 0.53* 0.54* BUN 12 11 10  141 141  
K 3.7 4.0 3.9 * 111* 109* CO2 27 25 26 AGAP 6* 5* 6*  
* 97 99 LFTS: 
Recent Labs 12/05/19 0239 12/04/19 
0401 12/03/19 
0300 TBILI 0.4 0.4 0.4 ALT 22 25 26 SGOT 13* 16 17 AP 76 80 79  
TP 5.9* 6.1* 6.0* ALB 2.0* 2.0* 2.0* Microbiology:  
 
All Micro Results Procedure Component Value Units Date/Time CULTURE, BLOOD [[id number]] Collected:  12/01/19 2043 Order Status:  Completed Specimen:  Blood Updated:  12/05/19 8300 Special Requests: --     
  RIGHT Antecubital 
  
  Culture result: NO GROWTH 4 DAYS     
 CULTURE, BLOOD [[id number]] Collected:  12/01/19 2003 Order Status:  Completed Specimen:  Blood Updated:  12/05/19 2191 Special Requests: MEDIAL PORT Culture result: NO GROWTH 4 DAYS     
 CRYPTOSPORIDIUM, DIRECT DETECTION EIA [[id number]] Collected:  12/03/19 2303 Order Status:  Completed Updated:  12/04/19 1325 C. DIFFICILE AG & TOXIN A/B [[id number]] Collected:  12/03/19 2303 Order Status:  Completed Specimen:  Stool Updated:  12/04/19 1311 [id number] ANTIGEN    
  C. DIFFICILE GDH ANTIGEN-NEGATIVE  
     
  C. difficile toxin C. DIFFICILE TOXIN-NEGATIVE  
     
  PCR Reflex NOT APPLICABLE INTERPRETATION    
  NEGATIVE FOR TOXIGENIC C. DIFFICILE Clinical Consideration NEGATIVE FOR TOXIGENIC C. DIFFICILE  
     
 CULTURE, URINE [[id number]] Collected:  12/01/19 2058 Order Status:  Completed Specimen:  Urine from Clean catch Updated:  12/04/19 8074 Special Requests: NO SPECIAL REQUESTS Culture result: NO GROWTH 2 DAYS     
 CMV BY PCR, QT [[id number]] Collected:  12/04/19 0401 Order Status:  Completed Specimen:  Blood Updated:  12/04/19 0424 [address] [place] [address] Nishant Yang [[id number]] Collected:  11/22/19 4326 Order Status:  Completed Specimen:  Stool Updated:  11/27/19 1236 Source STOOL Ova & Parasite exam Final Report Below Comment: (NOTE) These results were obtained using wet preparation(s) and trichrome 
stained smear. This test does not include testing for Cryptosporidium parvum, Cyclospora, or Microsporidia. Performed At: [address], [address] [facility] Alejandro Ferrell MD DR:[id number] C. DIFFICILE AG & TOXIN A/B [[id number]] Collected:  11/27/19 0525 Order Status:  Completed Specimen:  Stool Updated:  11/27/19 1233 [id number] Elena Vershire ANTIGEN    
  C. DIFFICILE GDH ANTIGEN-NEGATIVE  
     
  C. difficile toxin C. DIFFICILE TOXIN-NEGATIVE  
     
  PCR Reflex NOT APPLICABLE INTERPRETATION    
  NEGATIVE FOR TOXIGENIC C. DIFFICILE Clinical Consideration NEGATIVE FOR TOXIGENIC C. DIFFICILE  
     
 CULTURE, BLOOD [[id number]] Collected:  11/21/19 0106 Order Status:  Completed Specimen:  Blood Updated:  11/26/19 9041 Special Requests: --     
  RIGHT 
HAND Culture result: NO GROWTH 5 DAYS     
 CULTURE, BLOOD [[id number]] Collected:  11/20/19 2028 Order Status:  Completed Specimen:  Blood Updated:  11/25/19 3906 Special Requests: PORT Culture result: NO GROWTH 5 DAYS     
 CULTURE, STOOL [[id number]] Collected:  11/22/19 9273 Order Status:  Completed Specimen:  Stool Updated:  11/24/19 4227 Special Requests: NO SPECIAL REQUESTS Culture result:    
  No Salmonella, Shigella, or Ecoli 0157 isolated. NO GROWTH OF GRAM NEGATIVE FECAL REGGIE,  
     
 CULTURE, BLOOD [[id number]] Collected:  11/18/19 2032 Order Status:  Completed Specimen:  Blood Updated:  11/23/19 2610 Special Requests: --     
  RIGHT 
HAND Culture result: NO GROWTH 5 DAYS     
 CULTURE, BLOOD [[id number]]  (Abnormal)  (Susceptibility) Collected:  11/18/19 1955 Order Status:  Completed Specimen:  Blood Updated:  11/22/19 0730 Special Requests: LATERAL PORT     
  GRAM STAIN GRAM POS COCCI IN CHAINS AEROBIC AND ANAEROBIC BOTTLES RESULTS VERIFIED, PHONED TO AND READ BACK BY EJ CORREA RN @ 9994 ON 11/19/2019 BY AMM Culture result:    
  ENTEROCOCCUS FAECALIS GROUP D ALPHA STREPTOCOCCUS, NOT S. PNEUMONIAE THIS ORGANISM MAY BE INDICATIVE OF CULTURE CONTAMINATION, HOWEVER, CLINICAL CORRELATION NEEDS TO BE EVALUATED, AS EACH CASE IS UNIQUE. REFER TO [facility] Renzo Mendoza PANEL [id number] CULTURE, URINE [[id number]] Collected:  11/19/19 0102 Order Status:  Completed Specimen:  Urine from Clean catch Updated:  11/21/19 0710 Special Requests: NO SPECIAL REQUESTS Culture result: NO GROWTH 2 DAYS     
 BLOOD CULTURE ID PANEL [[id number]]  (Abnormal) Collected:  11/18/19 1955 Order Status:  Completed Specimen:  Blood Updated:  11/19/19 1420 Acc. no. from [facility] [id number] Enterococcus DETECTED Streptococcus DETECTED Comment: RESULTS VERIFIED, PHONED TO AND READ BACK BY 
EJ CORREA RN @ 4569 ON 11/19/2019 BY AMM 
  
  
  Angela ALMODOVAR/ZARIA (Vancomycin Resistance Gene) NOT DETECTED Clinical Consideration Multiple organisms detected. Results do not replace susceptibility testing. Shanna Ser [[id number]] Collected:  11/19/19 0945 Order Status:  Canceled Specimen:  Stool Imaging:  
Reviewed and none new CXR 12/1/19 IMPRESSION: No acute cardiopulmonary abnormality. No infiltrate appreciated. Signed By: Reg Beck NP December 5, 2019

## 2019-12-05 NOTE — PROGRESS NOTES
[facility] Hematology & Oncology Inpatient Hematology / Oncology Daily Progress Note Reason for Admission:  Admission for antineoplastic chemotherapy [Z51.11] 24 Hour Events: 
Tmax 101.9, VSS, on O2 @ 4L Day 28 FLAG-VENITA  Awaiting count recovery, on Granix  at bedside ROS: 
Constitutional: +fatigue +fever +malaise CV: Negative for chest pain, palpitations, edema. Respiratory: Negative for dyspnea, cough, wheezing. GI: Negative for nausea, abdominal pain. +Diarrhea- improved. 10 point review of systems is otherwise negative with the exception of the elements mentioned above in the HPI. No Known Allergies Past Medical History:  
Diagnosis Date  AML (acute myeloid leukemia) ([facility] [address] dx- 4/2019  
 followed by dr Sara Echevarria  Depression  Hypercholesterolemia  Infection   
 of port -- was placed 5/2019-- removed 6/2019---right chest  
 Psychiatric disorder   
 aniexty  Sleep apnea Past Surgical History:  
Procedure Laterality Date  HX OTHER SURGICAL    
 colonoscopy  HX VASCULAR ACCESS    
 IR INSERT TUNL CVC W PORT OVER 5 YEARS  4/30/2019  IR INSERT TUNL CVC W PORT OVER 5 YEARS  7/15/2019  IR REMOVE TUNL CVAD W PORT/PUMP  6/13/2019 Family History Problem Relation Age of Onset  Cancer Father Social History Socioeconomic History  Marital status:  Spouse name: Not on file  Number of children: Not on file  Years of education: Not on file  Highest education level: Not on file Occupational History  Not on file Social Needs  Financial resource strain: Not on file  Food insecurity:  
  Worry: Not on file Inability: Not on file  Transportation needs:  
  Medical: Not on file Non-medical: Not on file Tobacco Use  Smoking status: Never Smoker  Smokeless tobacco: Never Used Substance and Sexual Activity  Alcohol use: Never Frequency: Never  Drug use: Never  Sexual activity: Not on file Lifestyle  Physical activity:  
  Days per week: Not on file Minutes per session: Not on file  Stress: Not on file Relationships  Social connections:  
  Talks on phone: Not on file Gets together: Not on file Attends Anabaptism service: Not on file Active member of club or organization: Not on file Attends meetings of clubs or organizations: Not on file Relationship status: Not on file  Intimate partner violence:  
  Fear of current or ex partner: Not on file Emotionally abused: Not on file Physically abused: Not on file Forced sexual activity: Not on file Other Topics Concern [address] Service Not Asked  Blood Transfusions Not Asked  Caffeine Concern Not Asked  Occupational Exposure Not Asked [place] Hazards Not Asked  Sleep Concern Not Asked  Stress Concern Not Asked  Weight Concern Not Asked  Special Diet Not Asked  Back Care Not Asked  Exercise Not Asked  Bike Helmet Not Asked  Seat Belt Not Asked  Self-Exams Not Asked Social History Narrative  Not on file Current Facility-Administered Medications Medication Dose Route Frequency Provider Last Rate Last Dose  furosemide (LASIX) injection 40 mg  40 mg IntraVENous Maryann Moctezuma NP      
 magnesium oxide (MAG-OX) tablet 400 mg  400 mg Oral BID Abbey Quinn NP      
 dronabinol (MARINOL) capsule 2.5 mg  2.5 mg Oral ACB&D Abbey Quinn NP   2.5 mg at 12/05/19 0815  
 0.9% sodium chloride infusion 250 mL  250 mL IntraVENous PRN Leandra Chandra MD      
 0.9% sodium chloride infusion 250 mL  250 mL IntraVENous PRN Leandra Chandra MD      
 acetaminophen/diphenhydrAMINE (TYLENOL PM EXT STR) 500/25 mg (Patient Supplied)   Oral QHS PRN Roney Bruner NP      
 temazepam (RESTORIL) capsule 15 mg  15 mg Oral QHS Marilu Ferrell NP   15 mg at 12/04/19 4586  camphor-menthol (SARNA) 0.5-0.5 % lotion   Topical TID Lynne Madrigal NP      
 piperacillin-tazobactam (ZOSYN) 3.375 g in 0.9% sodium chloride (MBP/ADV) 100 mL  3.375 g IntraVENous Q8H Danish Krishna MD 25 mL/hr at 12/05/19 0622 3.375 g at 12/05/19 0622  
 baclofen (LIORESAL) tablet 5 mg  5 mg Oral Q8H PRN Adela Brush MD   5 mg at 11/21/19 1335  potassium chloride (K-DUR, KLOR-CON) SR tablet 20 mEq  20 mEq Oral BID Adela Brush MD   20 mEq at 12/05/19 0816  
 albuterol (PROVENTIL VENTOLIN) nebulizer solution 2.5 mg  2.5 mg Nebulization Q6H PRN Drew Ricks NP   2.5 mg at 11/21/19 1600  
 alum-mag hydroxide-simeth (MYLANTA) oral suspension 30 mL  30 mL Oral Q4H PRN Adela Brush MD   30 mL at 11/30/19 1935  loperamide (IMODIUM) capsule 2 mg  2 mg Oral Q4H PRN Adela Brush MD   2 mg at 12/05/19 7463  loratadine (CLARITIN) tablet 10 mg  10 mg Oral DAILY Marquez Palma NP   10 mg at 12/05/19 3228  central line flush (saline) syringe 10 mL  10 mL InterCATHeter PRN Adela Brush MD   10 mL at 11/28/19 2109  
 docusate sodium (COLACE) capsule 100 mg  100 mg Oral BID PRN Maya Valderrama NP   100 mg at 11/11/19 1308  LORazepam (ATIVAN) injection 0.5 mg  0.5 mg IntraVENous Q6H PRN Adela Brush MD   0.5 mg at 11/27/19 1201  
 saline peripheral flush soln 10 mL  10 mL InterCATHeter PRN Adela Brush MD   10 mL at 11/23/19 2229  
 heparin (porcine) pf 300-500 Units  300-500 Units InterCATHeter PRN Adela Brush MD   300 Units at 12/01/19 0245  tbo-filgrastim (GRANIX) injection 480 mcg  480 mcg SubCUTAneous QHS Adela Brush MD   480 mcg at 12/04/19 2126  acyclovir (ZOVIRAX) tablet 400 mg  400 mg Oral BID Maya Valderrama NP   400 mg at 12/05/19 8173  allopurinol (ZYLOPRIM) tablet 300 mg  300 mg Oral DAILY Maya Valderrama NP   300 mg at 12/05/19 0310  cholecalciferol (VITAMIN D3) (400 Units /10 mcg) tablet 2 Tab  800 Units Oral DAILY Maya Valderrama NP   2 Tab at 12/05/19 6044  DULoxetine (CYMBALTA) capsule 30 mg  30 mg Oral DAILY Mickiel Cage, NP   30 mg at 19 9763  lidocaine-prilocaine (EMLA) 2.5-2.5 % cream   Topical PRN Mickiel Cage, NP      
 LORazepam (ATIVAN) tablet 1 mg  1 mg Oral QHS Mickiel Cage, NP   1 mg at 19  pravastatin (PRAVACHOL) tablet 10 mg  10 mg Oral QHS Mickiel Cage, NP   10 mg at 19  voriconazole (VFEND) tablet 200 mg  200 mg Oral Q12H Mickiel Cage, NP   200 mg at 19 9255  ondansetron (ZOFRAN) injection 4 mg  4 mg IntraVENous Q4H PRN Mickiel Cage, NP   4 mg at 19 1738  prochlorperazine (COMPAZINE) with saline injection 5 mg  5 mg IntraVENous Q6H PRN Mickiel Cage, NP   5 mg at 19 1614  
 HYDROcodone-acetaminophen (NORCO) 5-325 mg per tablet 1 Tab  1 Tab Oral Q6H PRN Mickiel Cage, NP   1 Tab at 19 9153  morphine injection 2 mg  2 mg IntraVENous Q4H PRN Mickiel Cage, NP      
 acetaminophen (TYLENOL) tablet 650 mg  650 mg Oral Q6H PRN Stephen Farley MD   650 mg at 19 4126  diphenhydrAMINE (BENADRYL) capsule 25 mg  25 mg Oral Q6H PRN Stephen Farley MD   25 mg at 19 1356  vit C,Y-Us-cgurv-lutein-zeaxan (PRESERVISION AREDS-2) capsule 1 Cap (Patient Supplied)  1 Cap Oral DAILY Stephen Farley MD   1 Cap at 19 7089 OBJECTIVE: 
Patient Vitals for the past 8 hrs: 
 BP Temp Pulse Resp SpO2  
19 1115 122/60 98.7 °F (37.1 °C) 94 16 99 % 19 0640 145/65 98.5 °F (36.9 °C) 89 16 97 % Temp (24hrs), Av.6 °F (37.6 °C), Min:98.2 °F (36.8 °C), Max:101.9 °F (38.8 °C) 701 - 1900 In: 360 [P.O.:360] Out: 350 [Urine:350] Physical Exam: 
Constitutional: Well developed, frail appearing female in no acute distress, sitting comfortably in bed HEENT: Normocephalic and atraumatic. Oropharynx is clear, mucous membranes are moist. Extraocular muscles are intact. Sclerae anicteric. Skin Warm and dry. Scattered rash. No erythema. No pallor.  Bruising noted on BUE/R elbow. Respiratory Lungs with crackles in bases,  normal air exchange without accessory muscle use. On O2 via NC.  
CVS Normal rate, regular rhythm and normal S1 and S2. No murmurs, gallops, or rubs. Abdomen Soft, nontender, nondistended, normoactive bowel sounds. Neuro Grossly nonfocal with no obvious sensory or motor deficits. MSK Normal range of motion in general.  No edema and no tenderness. Psych Appropriate mood and affect. Labs:   
Recent Results (from the past 24 hour(s)) METABOLIC PANEL, COMPREHENSIVE Collection Time: 12/05/19  2:39 AM  
Result Value Ref Range Sodium 142 136 - 145 mmol/L Potassium 3.7 3.5 - 5.1 mmol/L Chloride 109 (H) 98 - 107 mmol/L  
 CO2 27 21 - 32 mmol/L Anion gap 6 (L) 7 - 16 mmol/L Glucose 108 (H) 65 - 100 mg/dL BUN 12 8 - 23 MG/DL Creatinine 0.59 (L) 0.6 - 1.0 MG/DL  
 GFR est AA >60 >60 ml/min/1.73m2 GFR est non-AA >60 >60 ml/min/1.73m2 Calcium 8.5 8.3 - 10.4 MG/DL Bilirubin, total 0.4 0.2 - 1.1 MG/DL  
 ALT (SGPT) 22 12 - 65 U/L  
 AST (SGOT) 13 (L) 15 - 37 U/L Alk. phosphatase 76 50 - 136 U/L Protein, total 5.9 (L) 6.3 - 8.2 g/dL Albumin 2.0 (L) 3.2 - 4.6 g/dL Globulin 3.9 (H) 2.3 - 3.5 g/dL A-G Ratio 0.5 (L) 1.2 - 3.5 MAGNESIUM Collection Time: 12/05/19  2:39 AM  
Result Value Ref Range Magnesium 1.7 (L) 1.8 - 2.4 mg/dL LD Collection Time: 12/05/19  2:39 AM  
Result Value Ref Range  110 - 210 U/L  
PHOSPHORUS Collection Time: 12/05/19  2:39 AM  
Result Value Ref Range Phosphorus 3.1 2.3 - 3.7 MG/DL  
CBC WITH AUTOMATED DIFF Collection Time: 12/05/19  2:39 AM  
Result Value Ref Range WBC 0.1 (LL) 4.3 - 11.1 K/uL  
 RBC 2.52 (L) 4.05 - 5.2 M/uL HGB 7.5 (L) 11.7 - 15.4 g/dL HCT 22.4 (L) 35.8 - 46.3 % MCV 88.9 79.6 - 97.8 FL  
 MCH 29.8 26.1 - 32.9 PG  
 MCHC 33.5 31.4 - 35.0 g/dL  
 RDW 13.2 11.9 - 14.6 % PLATELET 34 (L) 590 - 450 K/uL MPV 9.8 9.4 - 12.3 FL ABSOLUTE NRBC 0.00 0.0 - 0.2 K/uL  
 RBC COMMENTS NORMOCYTIC/NORMOCHROMIC    
 WBC COMMENTS WBC TOO FEW TO DIFFERENTIATE PLATELET COMMENTS DECREASED    
 DF MANUAL Imaging: 
CT HEAD WO CONT [[id number]] Collected: 11/19/19 1050 Order Status: Completed Updated: 11/19/19 1054 Narrative:    
CT HEAD WITHOUT CONTRAST, 11/19/2019 History: Fall last night hitting left side of head Comparison: . Technique:   5 mm axial scans from the skull base to the vertex. All CT scans 
performed at this facility use one or all of the following: Automated exposure 
control, adjustment of the mA and/or kVp according to patient's size, iterative 
reconstruction. Findings:  No evidence of intracranial hemorrhage is seen. Faint bilateral basal 
ganglia calcifications are seen. No abnormal extra-axial fluid collections are 
seen.  Mild cortical involutional changes are seen which are not felt to be 
abnormal given the patient's age. Claybon Pea evidence for acute hydrocephalus is seen. No evidence of midline shift or herniation is seen.  No abnormal edema pattern 
is seen in a vascular distribution to suggest large artery infarction. Evaluation with bone windows shows no acute osseous changes.  The visualized 
sinuses, middle ears, and mastoid air cells are well aerated. Impression:    
IMPRESSION:   
1.  No acute intracranial process evident by noncontrast CT study of the head. XR CHEST SNGL V [[id number]] Collected: 11/18/19 2041 Order Status: Completed Updated: 11/18/19 2044 Narrative:    
EXAM: XR CHEST SNGL V 
 
INDICATION: neutropenic fever COMPARISON: 9/29/2019 FINDINGS: A portable AP radiograph of the chest was obtained at 1946 hours. The 
patient is on a cardiac monitor. The lungs are clear. The cardiac and 
mediastinal contours and pulmonary vascularity are normal.  The bones and soft 
tissues are grossly within normal limits.    
Impression:    
 IMPRESSION: Normal chest.  
XR ELBOW RT MIN 3 V [[id number]] Collected: 11/10/19 1421 Order Status: Completed Updated: 11/10/19 1424 Narrative:    
Right elbow Clinical location: Elbow pain after feeling a pop, decreased range of motion FINDINGS: Four views of the right elbow show no fracture or dislocation. There 
is no joint effusion. The soft tissues are unremarkable. Impression:    
IMPRESSION: No acute osseous abnormality or joint derangement of the right 
elbow. ASSESSMENT: 
Problem List  Date Reviewed: 10/31/2019 Codes Class Noted Febrile neutropenia (HCC) ICD-10-CM: D70.9, R50.81 ICD-9-CM: 288.00, 780.61  9/21/2019 Pancytopenia due to antineoplastic chemotherapy [facility]) ICD-10-CM: D61.810, T45.1X5A 
ICD-9-CM: 284.11, E933.1  6/12/2019 Cellulitis of neck ICD-10-CM: A54.483 ICD-9-CM: 682.1  6/12/2019 Immunocompromised status associated with infection ICD-10-CM: B99.9 ICD-9-CM: 136.9  6/12/2019 Port or reservoir infection ICD-10-CM: K00.762L ICD-9-CM: 999.33  6/12/2019 Acute myeloid leukemia not having achieved remission ([facility] ICD-10-CM: C92.00 ICD-9-CM: 205.00  5/9/2019 Admission for antineoplastic chemotherapy ICD-10-CM: Z51.11 ICD-9-CM: V58.11  5/5/2019 AML (acute myeloblastic leukemia) ([facility] [address] ICD-10-CM: C92.00 ICD-9-CM: 205.00  4/28/2019 Weakness generalized ICD-10-CM: R53.1 ICD-9-CM: 780.79  4/28/2019 Pancytopenia ([facility] ICD-10-CM: K42.365 ICD-9-CM: 284.19  4/28/2019 Thrombocytopenia ([facility] ICD-10-CM: D69.6 ICD-9-CM: 287.5  4/27/2019 Ms. Luna Doshi is a 68 y.o. female admitted on 11/7/2019. She is a known patient of Dr. 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 She failed induction with 7+3/Midostaurin. On Dacogen/Gilteritinib with recent BMbx with persistent residual disease.    She is being admitted for FLAG-VENITA. She is feeling well and is ready to proceed with treatment. PLAN: 
AML 
- admit for salvage FLAG-VENITA 
- needs Echo prior - ordered 11/8 Day 1 FLAG-VENITA. Echo with EF 55-60%, proceed with tx. 
11/9 Day 2 FLAG-VENITA. Tolerating well, no issues. Uric acid 3.1 today. 11/10 Day 3 FLAG-VENITA. No issues with treatment. Uric acid 3.3 today. 11/12 Day 5 FLAG-VENITA. Tolerating treatment well. G-CSF to start tomorrow. 11/13 Day 6 FLAG-VENITA, doing well, Granix/claritin starts today 12/4 Day 27 FLAG VENITA. Awaiting count recovery, con't Granix. WBC up to 100 today. 12/5 Day 28. . Con't Granix. Pancytopenia secondary to disease - Transfuse prn per Alexander SOPs R elbow pain 11/11 c/o R elbow pain with limited ROM yesterday. Xray neg. Pain improved today, noted bruising. Normal ROM on exam. 
 
Mild Abd discomfort/loose stools 11/13 discomfort is improved from yesterday, will monitor 11/14 loose stools, but not watery, can use Imodium prn 
11/15 Imodium working well  
11/16 controlled 11/26 Ova and parasite 11/22 pending. Check for C diff if stool appropriate. Continue anti diarrheal for now Neutropenic fever / Sepsis not present on admission / UTI / Strep/Enterococcus bacteremia 11/19 Tmax 102. 1.  UA +UTI. UCx pending. BCx-NGTD. CXR neg. On Cef/Vanc. DC prophylactic LVQ. 11/20 Tmax 102. BCx positive for streptococcus/enterococcus. Subculture in progress. On Cef/Vanc.   
11/22 repeat BC NTD. Alpha strep on vanc. 11/24 Repeat BC NTD. No fever. 11/25 new skin rash. ?RT to vanc. Consult ID for recommendations. 11/26 ID dc'd cefe and vanc. Started zosyn. TTE ordered. 12/2 Tmax 101. CXR neg.  UA neg. Repeat cultures. On Zosyn, restarted Vanc last PM. 
12/3 Tmax 102. Cultures NGTD. Con't Zosyn/Vanc. 12/4 Tmax 101.8.  ?r/t marrow recovery? UCx-NG. BCx-NGTD. Con't Vanc/Zosyn. ID following - checking stool for giardia/cryptospordium and repeating CMV. 12/5 Tmax 101.9. BCx-NGTD. ID DC'd Vanc yesterday. Stool studies, CMV, and Fungitell pending. Fall 
11/19 s/p last PM.  No LOC. Hit head. CT head neg. Double vision 11/21 Neuro has seen patient. Concerns for possible 6th nerve palsy. Recommends LP. We will hold with WBC 0.0 and continue to monitor. 11/22 vision improved today. MRI brain pending. 11/23 MRI unremarkable. Discussed with Neuro. No need for LP 
11/27 Resolved Increased O2 needs / Hypoxia 12/5 O2 up to 4L via NC. Check CXR, BNP. Wt up 2# with +1.3L. Lasix 40mg IV x 1 ordered. Continue home meds Prophylactic Antibx: Acyclovir, Voriconazole Mily SOPs SCDs for DVT prophylaxis (AC contraindicated d/t thrombocytopenia) Goals and plan of care reviewed with the patient. All questions answered to the best of our ability. Disposition: Day 28 FLAG-VENITA. . Awaiting count recovery, con't Granix. If no count recovery by end of week, plan for repeat BMbx. Transfusions per mily SOPs. Upon discharge will need twice weekly labs w transfusions as needed. Weekly provider visits. RTC within 1 week from discharge. Pema Crowell, ARMANDO [facility] Hematology & Oncology [address] [place] [address] Office : [phone] Fax : [phone]

## 2019-12-05 NOTE — PROGRESS NOTES
Follow-up visit to convey care and concern. Mrs. Milana Douglas voiced that she has been feeling \"rough. \" She also expressed gratitude for being able to finally eat food (veggie soup) that did not upset her stomach today. Mrs. Milana Douglas shared about her chemo and highlighted that this was the hardest round of chemo she has experienced. I offered several spiritual interventions, including empathic listening, affirmation of anli & emotions, exploration of coping skills, and prayer as requested. I offered care to Mr. Milana Douglas, spouse, also during this visit. Chaplains remain available for support. Carlotta Arrieta MDiv Board Certified [employer]

## 2019-12-05 NOTE — PROGRESS NOTES
Report given to oncoming RN. Lasix given with good urine output. Pt on 2 L NC with sats in low-mid 90s. PRn zofran x 1 for nausea. CXR completed. VSS; no fevers.

## 2019-12-06 LAB
ALBUMIN SERPL-MCNC: 2.1 G/DL (ref 3.2–4.6)
ALBUMIN/GLOB SERPL: 0.5 {RATIO} (ref 1.2–3.5)
ALP SERPL-CCNC: 77 U/L (ref 50–136)
ALT SERPL-CCNC: 20 U/L (ref 12–65)
ANION GAP SERPL CALC-SCNC: 7 MMOL/L (ref 7–16)
AST SERPL-CCNC: 14 U/L (ref 15–37)
BACTERIA SPEC CULT: NORMAL
BACTERIA SPEC CULT: NORMAL
BILIRUB SERPL-MCNC: 0.3 MG/DL (ref 0.2–1.1)
BUN SERPL-MCNC: 12 MG/DL (ref 8–23)
CALCIUM SERPL-MCNC: 8.4 MG/DL (ref 8.3–10.4)
CHLORIDE SERPL-SCNC: 107 MMOL/L (ref 98–107)
CMV DNA SERPL NAA+PROBE-ACNC: NEGATIVE IU/ML
CMV DNA SERPL NAA+PROBE-LOG IU: NORMAL LOG10 IU/ML
CO2 SERPL-SCNC: 28 MMOL/L (ref 21–32)
CREAT SERPL-MCNC: 0.61 MG/DL (ref 0.6–1)
CRYPTOSP AG STL QL IA: NEGATIVE
DIFFERENTIAL METHOD BLD: ABNORMAL
ERYTHROCYTE [DISTWIDTH] IN BLOOD BY AUTOMATED COUNT: 13.2 % (ref 11.9–14.6)
G LAMBLIA AG STL QL IA: NEGATIVE
GLOBULIN SER CALC-MCNC: 4.1 G/DL (ref 2.3–3.5)
GLUCOSE SERPL-MCNC: 98 MG/DL (ref 65–100)
HCT VFR BLD AUTO: 23.4 % (ref 35.8–46.3)
HGB BLD-MCNC: 7.8 G/DL (ref 11.7–15.4)
LDH SERPL L TO P-CCNC: 163 U/L (ref 110–210)
MAGNESIUM SERPL-MCNC: 1.7 MG/DL (ref 1.8–2.4)
MCH RBC QN AUTO: 30 PG (ref 26.1–32.9)
MCHC RBC AUTO-ENTMCNC: 33.3 G/DL (ref 31.4–35)
MCV RBC AUTO: 90 FL (ref 79.6–97.8)
NRBC # BLD: 0 K/UL (ref 0–0.2)
PHOSPHATE SERPL-MCNC: 3 MG/DL (ref 2.3–3.7)
PLATELET # BLD AUTO: 18 K/UL (ref 150–450)
PLATELET COMMENTS,PCOM: ABNORMAL
PMV BLD AUTO: 10.8 FL (ref 9.4–12.3)
POTASSIUM SERPL-SCNC: 3.7 MMOL/L (ref 3.5–5.1)
PROT SERPL-MCNC: 6.2 G/DL (ref 6.3–8.2)
RBC # BLD AUTO: 2.6 M/UL (ref 4.05–5.2)
RBC MORPH BLD: ABNORMAL
SERVICE CMNT-IMP: NORMAL
SERVICE CMNT-IMP: NORMAL
SODIUM SERPL-SCNC: 142 MMOL/L (ref 136–145)
SPECIMEN SOURCE: NORMAL
SPECIMEN SOURCE: NORMAL
WBC # BLD AUTO: 0.2 K/UL (ref 4.3–11.1)
WBC MORPH BLD: ABNORMAL

## 2019-12-06 PROCEDURE — 94760 N-INVAS EAR/PLS OXIMETRY 1: CPT

## 2019-12-06 PROCEDURE — 99232 SBSQ HOSP IP/OBS MODERATE 35: CPT | Performed by: INTERNAL MEDICINE

## 2019-12-06 PROCEDURE — 80053 COMPREHEN METABOLIC PANEL: CPT

## 2019-12-06 PROCEDURE — 74011250637 HC RX REV CODE- 250/637: Performed by: INTERNAL MEDICINE

## 2019-12-06 PROCEDURE — 74011000258 HC RX REV CODE- 258: Performed by: INTERNAL MEDICINE

## 2019-12-06 PROCEDURE — 77010033678 HC OXYGEN DAILY

## 2019-12-06 PROCEDURE — 74011250636 HC RX REV CODE- 250/636: Performed by: INTERNAL MEDICINE

## 2019-12-06 PROCEDURE — 65270000015 HC RM PRIVATE ONCOLOGY

## 2019-12-06 PROCEDURE — 85025 COMPLETE CBC W/AUTO DIFF WBC: CPT

## 2019-12-06 PROCEDURE — 83615 LACTATE (LD) (LDH) ENZYME: CPT

## 2019-12-06 PROCEDURE — 83735 ASSAY OF MAGNESIUM: CPT

## 2019-12-06 PROCEDURE — 84100 ASSAY OF PHOSPHORUS: CPT

## 2019-12-06 PROCEDURE — 74011250636 HC RX REV CODE- 250/636: Performed by: NURSE PRACTITIONER

## 2019-12-06 PROCEDURE — 74011250637 HC RX REV CODE- 250/637: Performed by: NURSE PRACTITIONER

## 2019-12-06 PROCEDURE — 65270000029 HC RM PRIVATE

## 2019-12-06 PROCEDURE — 36591 DRAW BLOOD OFF VENOUS DEVICE: CPT

## 2019-12-06 RX ORDER — FUROSEMIDE 10 MG/ML
40 INJECTION INTRAMUSCULAR; INTRAVENOUS ONCE
Status: COMPLETED | OUTPATIENT
Start: 2019-12-06 | End: 2019-12-06

## 2019-12-06 RX ORDER — DIPHENOXYLATE HYDROCHLORIDE AND ATROPINE SULFATE 2.5; .025 MG/1; MG/1
1 TABLET ORAL
Status: DISCONTINUED | OUTPATIENT
Start: 2019-12-06 | End: 2019-12-12 | Stop reason: HOSPADM

## 2019-12-06 RX ADMIN — DRONABINOL 2.5 MG: 2.5 CAPSULE ORAL at 16:51

## 2019-12-06 RX ADMIN — TBO-FILGRASTIM 480 MCG: 480 INJECTION, SOLUTION SUBCUTANEOUS at 22:02

## 2019-12-06 RX ADMIN — LOPERAMIDE HYDROCHLORIDE 2 MG: 2 CAPSULE ORAL at 22:12

## 2019-12-06 RX ADMIN — Medication 400 MG: at 08:40

## 2019-12-06 RX ADMIN — Medication 400 MG: at 18:29

## 2019-12-06 RX ADMIN — DRONABINOL 2.5 MG: 2.5 CAPSULE ORAL at 07:33

## 2019-12-06 RX ADMIN — ALLOPURINOL 300 MG: 300 TABLET ORAL at 09:00

## 2019-12-06 RX ADMIN — Medication 2 TABLET: at 08:40

## 2019-12-06 RX ADMIN — PIPERACILLIN AND TAZOBACTAM 3.38 G: 3; .375 INJECTION, POWDER, FOR SOLUTION INTRAVENOUS at 05:15

## 2019-12-06 RX ADMIN — DULOXETINE 30 MG: 30 CAPSULE, DELAYED RELEASE ORAL at 08:40

## 2019-12-06 RX ADMIN — ACYCLOVIR 400 MG: 800 TABLET ORAL at 18:29

## 2019-12-06 RX ADMIN — ONDANSETRON 4 MG: 2 INJECTION INTRAMUSCULAR; INTRAVENOUS at 16:50

## 2019-12-06 RX ADMIN — LOPERAMIDE HYDROCHLORIDE 2 MG: 2 CAPSULE ORAL at 09:25

## 2019-12-06 RX ADMIN — POTASSIUM CHLORIDE 20 MEQ: 20 TABLET, EXTENDED RELEASE ORAL at 08:40

## 2019-12-06 RX ADMIN — CAMPHOR AND MENTHOL 1 ML: 5; 5 LOTION TOPICAL at 09:30

## 2019-12-06 RX ADMIN — PIPERACILLIN AND TAZOBACTAM 3.38 G: 3; .375 INJECTION, POWDER, FOR SOLUTION INTRAVENOUS at 22:02

## 2019-12-06 RX ADMIN — DIPHENOXYLATE HYDROCHLORIDE AND ATROPINE SULFATE 1 TABLET: 2.5; .025 TABLET ORAL at 14:34

## 2019-12-06 RX ADMIN — CAMPHOR AND MENTHOL 1 ML: 5; 5 LOTION TOPICAL at 16:00

## 2019-12-06 RX ADMIN — VORICONAZOLE 200 MG: 200 TABLET, FILM COATED ORAL at 22:02

## 2019-12-06 RX ADMIN — FUROSEMIDE 40 MG: 10 INJECTION, SOLUTION INTRAMUSCULAR; INTRAVENOUS at 11:30

## 2019-12-06 RX ADMIN — LORAZEPAM 1 MG: 1 TABLET ORAL at 22:02

## 2019-12-06 RX ADMIN — PRAVASTATIN SODIUM 10 MG: 20 TABLET ORAL at 22:02

## 2019-12-06 RX ADMIN — VORICONAZOLE 200 MG: 200 TABLET, FILM COATED ORAL at 08:40

## 2019-12-06 RX ADMIN — ACYCLOVIR 400 MG: 800 TABLET ORAL at 08:39

## 2019-12-06 RX ADMIN — PIPERACILLIN AND TAZOBACTAM 3.38 G: 3; .375 INJECTION, POWDER, FOR SOLUTION INTRAVENOUS at 14:07

## 2019-12-06 RX ADMIN — CAMPHOR AND MENTHOL: 5; 5 LOTION TOPICAL at 22:02

## 2019-12-06 RX ADMIN — TEMAZEPAM 15 MG: 15 CAPSULE ORAL at 22:02

## 2019-12-06 RX ADMIN — LORATADINE 10 MG: 10 TABLET ORAL at 08:40

## 2019-12-06 RX ADMIN — POTASSIUM CHLORIDE 20 MEQ: 20 TABLET, EXTENDED RELEASE ORAL at 18:29

## 2019-12-06 NOTE — PROGRESS NOTES
Problem: Falls - Risk of 
Goal: *Absence of Falls Description Document Ramon Host Fall Risk and appropriate interventions in the flowsheet. Outcome: Progressing Towards Goal 
Note: Fall Risk Interventions: 
Mobility Interventions: Communicate number of staff needed for ambulation/transfer Medication Interventions: Patient to call before getting OOB Elimination Interventions: Call light in reach History of Falls Interventions: Investigate reason for fall, Room close to nurse's station

## 2019-12-06 NOTE — PROGRESS NOTES
Infectious Disease Progress Note Today's Date: 2019 Admit Date: 2019 Impression: · E faecalis/alpha strep bacteremia (); repeat blood cx () NG ; TTE (-); source Port vs GI 
· AML; admitted for salvage [facility] · Febrile neutropenia · Pancytopenia · Chronic diarrhea Plan:  
· Continue zosyn. Fungitell and CMV PCR pending. Stool cryptosporidium and C diff negative. · Blood and urine no growth. · ID will continue to follow. Anti-infectives: · Agustina Moulder · ACV 
· IV Vancomycin (-) · IV Cefepime (-) · PO LVQ (-) · IV Zosyn (- Subjective: Interval History: afebrile overnight with Tmax 99.7 past 24 hrs. Feeling a little more optimistic, reports persistent fatigue and diarrhea is still loose with imodium and she has had two incontinent and unexpected stools. Has some nausea, denies vomiting, chills or sweats. Breathing is ok, but reports that she tires easily with activity. No Known Allergies Review of Systems:  A comprehensive review of systems was negative except for that written in the History of Present Illness. Objective:  
 
Visit Vitals /56 (BP 1 Location: Left arm, BP Patient Position: At rest) Pulse 89 Temp 98.9 °F (37.2 °C) Resp 18 Ht 5' 6\" (1.676 m) Wt 82.4 kg (181 lb 11.2 oz) SpO2 92% BMI 29.33 kg/m² Temp (24hrs), Av °F (37.2 °C), Min:98.3 °F (36.8 °C), Max:99.7 °F (37.6 °C) Lines:  Left chest port:    
 
Physical Exam:   
General: Alert, appears fatigued, no acute distress, appears chronically ill Head:    Normocephalic, atraumatic, alopecia Eyes:   Anicteric, not injected, no drainage Mouth:  Moist mucosa Neck:   Supple, symmetrical, trachea midline, no JVD Lungs:   Posterior lung fields with light crackles throughout, O2 via NC 
CV:   Tachycardic without audible murmur Abdomen:  Soft, mildly tender RLQ, active bowel sounds Extremities:  No cyanosis or edema Musculoskeletal: Moves all extremities Skin:   No acute rash or lesions, color and texture normal 
Psych:   Alert, oriented, no evidence of thought disorder. Lines:    benign Data Review: CBC: 
Recent Labs 12/06/19 
6123 12/05/19 
0239 12/04/19 
0401 WBC 0.2* 0.1* 0.1* HGB 7.8* 7.5* 8.2* HCT 23.4* 22.4* 24.1*  
PLT 18* 34* 10* BMP: 
Recent Labs 12/06/19 
4175 12/05/19 
0239 12/04/19 
0401 CREA 0.61 0.59* 0.53* BUN 12 12 11  142 141  
K 3.7 3.7 4.0  
 109* 111* CO2 28 27 25 AGAP 7 6* 5*  
GLU 98 108* 97 LFTS: 
Recent Labs 12/06/19 
7089 12/05/19 
0239 12/04/19 
0401 TBILI 0.3 0.4 0.4 ALT 20 22 25 SGOT 14* 13* 16  
AP 77 76 80  
TP 6.2* 5.9* 6.1* ALB 2.1* 2.0* 2.0* Microbiology:  
 
All Micro Results Procedure Component Value Units Date/Time CRYPTOSPORIDIUM, DIRECT DETECTION EIA [[id number]] Collected:  12/03/19 2303 Order Status:  Completed Specimen:  Stool Updated:  12/06/19 5765 Source STOOL Cryptosporidium NEGATIVE Comment: (NOTE) Performed At: [address] [id number] Tami Faustin MD [id number] CULTURE, BLOOD [[id number]] Collected:  12/01/19 2003 Order Status:  Completed Specimen:  Blood Updated:  12/06/19 3802 Special Requests: MEDIAL PORT Culture result: NO GROWTH 5 DAYS     
 CULTURE, BLOOD [[id number]] Collected:  12/01/19 2043 Order Status:  Completed Specimen:  Blood Updated:  12/06/19 9205 Special Requests: --     
  RIGHT Antecubital 
  
  Culture result: NO GROWTH 5 DAYS C. DIFFICILE AG & TOXIN A/B [[id number]] Collected:  12/03/19 2303 Order Status:  Completed Specimen:  Stool Updated:  12/04/19 1311 [id number] ANTIGEN    
  C. DIFFICILE GDH ANTIGEN-NEGATIVE  
     
  C. difficile toxin C. DIFFICILE TOXIN-NEGATIVE  
     
  PCR Reflex NOT APPLICABLE   INTERPRETATION    
  NEGATIVE FOR TOXIGENIC C. DIFFICILE  
 Clinical Consideration NEGATIVE FOR TOXIGENIC C. DIFFICILE  
     
 CULTURE, URINE [[id number]] Collected:  12/01/19 2058 Order Status:  Completed Specimen:  Urine from Clean catch Updated:  12/04/19 1204 Special Requests: NO SPECIAL REQUESTS Culture result: NO GROWTH 2 DAYS     
 CMV BY PCR, QT [[id number]] Collected:  12/04/19 0401 Order Status:  Completed Specimen:  Blood Updated:  12/04/19 0424 [address] [place] [address] Linda [[id number]] Collected:  11/22/19 1918 Order Status:  Completed Specimen:  Stool Updated:  11/27/19 1236 Source STOOL Ova & Parasite exam Final Report Below Comment: (NOTE) These results were obtained using wet preparation(s) and trichrome 
stained smear. This test does not include testing for Cryptosporidium parvum, Cyclospora, or Microsporidia. Performed At: [address], [address] [place] [address] Peterson Torres MD ZA:[id number] C. DIFFICILE AG & TOXIN A/B [[id number]] Collected:  11/27/19 0525 Order Status:  Completed Specimen:  Stool Updated:  11/27/19 1233 [id number] Elena Maitland ANTIGEN    
  C. DIFFICILE GDH ANTIGEN-NEGATIVE  
     
  C. difficile toxin C. DIFFICILE TOXIN-NEGATIVE  
     
  PCR Reflex NOT APPLICABLE INTERPRETATION    
  NEGATIVE FOR TOXIGENIC C. DIFFICILE Clinical Consideration NEGATIVE FOR TOXIGENIC C. DIFFICILE  
     
 CULTURE, BLOOD [[id number]] Collected:  11/21/19 0106 Order Status:  Completed Specimen:  Blood Updated:  11/26/19 2297 Special Requests: --     
  RIGHT 
HAND Culture result: NO GROWTH 5 DAYS     
 CULTURE, BLOOD [[id number]] Collected:  11/20/19 2028 Order Status:  Completed Specimen:  Blood Updated:  11/25/19 4167 Special Requests: PORT Culture result: NO GROWTH 5 DAYS     
 CULTURE, STOOL [[id number]] Collected:  11/22/19 2400 Order Status:  Completed Specimen:  Stool Updated:  11/24/19 5102 Special Requests: NO SPECIAL REQUESTS Culture result:    
  No Salmonella, Shigella, or Ecoli 0157 isolated. NO GROWTH OF GRAM NEGATIVE FECAL REGGIE,  
     
 CULTURE, BLOOD [[id number]] Collected:  11/18/19 2032 Order Status:  Completed Specimen:  Blood Updated:  11/23/19 4438 Special Requests: --     
  RIGHT 
HAND Culture result: NO GROWTH 5 DAYS     
 CULTURE, BLOOD [[id number]]  (Abnormal)  (Susceptibility) Collected:  11/18/19 1955 Order Status:  Completed Specimen:  Blood Updated:  11/22/19 0730 Special Requests: LATERAL PORT     
  GRAM STAIN GRAM POS COCCI IN CHAINS AEROBIC AND ANAEROBIC BOTTLES RESULTS VERIFIED, PHONED TO AND READ BACK BY EJ CORREA RN @ [phone] ON 11/19/2019 BY AMM Culture result:    
  ENTEROCOCCUS FAECALIS GROUP D ALPHA STREPTOCOCCUS, NOT S. PNEUMONIAE THIS ORGANISM MAY BE INDICATIVE OF CULTURE CONTAMINATION, HOWEVER, CLINICAL CORRELATION NEEDS TO BE EVALUATED, AS EACH CASE IS UNIQUE. REFER TO [facility] Renzo Mendoza PANEL [id number] CULTURE, URINE [[id number]] Collected:  11/19/19 0102 Order Status:  Completed Specimen:  Urine from Clean catch Updated:  11/21/19 0710 Special Requests: NO SPECIAL REQUESTS Culture result: NO GROWTH 2 DAYS     
 BLOOD CULTURE ID PANEL [[id number]]  (Abnormal) Collected:  11/18/19 1955 Order Status:  Completed Specimen:  Blood Updated:  11/19/19 1420 Acc. no. from [facility] [id number] Enterococcus DETECTED Streptococcus DETECTED Comment: RESULTS VERIFIED, PHONED TO AND READ BACK BY 
EJ CORREA RN @ 9606 ON 11/19/2019 BY AMDOMINIC ALMODOVAR/ZARIA (Vancomycin Resistance Gene) NOT DETECTED Clinical Consideration Multiple organisms detected. Results do not replace susceptibility testing. Aviva Postin [[id number]] Collected:  11/19/19 041 Order Status:  Canceled Specimen:  Stool Imaging: Reviewed: CXR 12/5/19 No active disease CXR 12/1/19 IMPRESSION: No acute cardiopulmonary abnormality. No infiltrate appreciated. Signed By: Og Callahan NP December 6, 2019

## 2019-12-06 NOTE — PROGRESS NOTES
Chart screened by  for discharge planning Pt is actively receiving Chemo treatment. Discharge pending count recovery. No needs identified at this time. Please consult  if any new issues arise Case Management will continue to follow.

## 2019-12-06 NOTE — PROGRESS NOTES
[address] [facility] Hematology & Oncology Inpatient Hematology / Oncology Daily Progress Note Reason for Admission:  Admission for antineoplastic chemotherapy [Z51.11] 24 Hour Events: 
Afebrile, VSS, on O2 @ 2L Day 29 FLAG-VENITA WBC up to 200 Awaiting count recovery, on Granix  at bedside ROS: 
Constitutional: +fatigue Negative for fever. CV: Negative for chest pain, palpitations, edema. Respiratory: Negative for dyspnea, cough, wheezing. GI: Negative for nausea, abdominal pain. +Diarrhea- improved. 10 point review of systems is otherwise negative with the exception of the elements mentioned above in the HPI. No Known Allergies Past Medical History:  
Diagnosis Date  AML (acute myeloid leukemia) ([facility] [address] dx- 4/2019  
 followed by dr Didier Webb  Depression  Hypercholesterolemia  Infection   
 of port -- was placed 5/2019-- removed 6/2019---right chest  
 Psychiatric disorder   
 aniexty  Sleep apnea Past Surgical History:  
Procedure Laterality Date  HX OTHER SURGICAL    
 colonoscopy  HX VASCULAR ACCESS    
 IR INSERT TUNL CVC W PORT OVER 5 YEARS  4/30/2019  IR INSERT TUNL CVC W PORT OVER 5 YEARS  7/15/2019  IR REMOVE TUNL CVAD W PORT/PUMP  6/13/2019 Family History Problem Relation Age of Onset  Cancer Father Social History Socioeconomic History  Marital status:  Spouse name: Not on file  Number of children: Not on file  Years of education: Not on file  Highest education level: Not on file Occupational History  Not on file Social Needs  Financial resource strain: Not on file  Food insecurity:  
  Worry: Not on file Inability: Not on file  Transportation needs:  
  Medical: Not on file Non-medical: Not on file Tobacco Use  Smoking status: Never Smoker  Smokeless tobacco: Never Used Substance and Sexual Activity  Alcohol use: Never Frequency: Never  Drug use: Never  Sexual activity: Not on file Lifestyle  Physical activity:  
  Days per week: Not on file Minutes per session: Not on file  Stress: Not on file Relationships  Social connections:  
  Talks on phone: Not on file Gets together: Not on file Attends Yazidism service: Not on file Active member of club or organization: Not on file Attends meetings of clubs or organizations: Not on file Relationship status: Not on file  Intimate partner violence:  
  Fear of current or ex partner: Not on file Emotionally abused: Not on file Physically abused: Not on file Forced sexual activity: Not on file Other Topics Concern [address] Service Not Asked  Blood Transfusions Not Asked  Caffeine Concern Not Asked  Occupational Exposure Not Asked Denette Luis Antonio Hazards Not Asked  Sleep Concern Not Asked  Stress Concern Not Asked  Weight Concern Not Asked  Special Diet Not Asked  Back Care Not Asked  Exercise Not Asked  Bike Helmet Not Asked  Seat Belt Not Asked  Self-Exams Not Asked Social History Narrative  Not on file Current Facility-Administered Medications Medication Dose Route Frequency Provider Last Rate Last Dose  magnesium oxide (MAG-OX) tablet 400 mg  400 mg Oral BID Leocadia Pastel, NP   400 mg at 12/06/19 0840  
 dronabinol (MARINOL) capsule 2.5 mg  2.5 mg Oral ACB&D Leocadia Pastel, NP   2.5 mg at 12/06/19 2495  
 0.9% sodium chloride infusion 250 mL  250 mL IntraVENous PRN César Christopher MD      
 0.9% sodium chloride infusion 250 mL  250 mL IntraVENous PRN César Christopher MD      
 acetaminophen/diphenhydrAMINE (TYLENOL PM EXT STR) 500/25 mg (Patient Supplied)   Oral QHS PRN Carlos Reeves NP      
 temazepam (RESTORIL) capsule 15 mg  15 mg Oral QHS Joel Schmitt NP   15 mg at 12/05/19 2136  camphor-menthol (SARNA) 0.5-0.5 % lotion   Topical TID Nery Carson NP      
  piperacillin-tazobactam (ZOSYN) 3.375 g in 0.9% sodium chloride (MBP/ADV) 100 mL  3.375 g IntraVENous Q8H Owen Richardson MD 25 mL/hr at 12/06/19 0515 3.375 g at 12/06/19 0515  baclofen (LIORESAL) tablet 5 mg  5 mg Oral Q8H PRN Andrea Zuluaga MD   5 mg at 11/21/19 1335  potassium chloride (K-DUR, KLOR-CON) SR tablet 20 mEq  20 mEq Oral BID Andrea Zuluaga MD   20 mEq at 12/06/19 0840  
 albuterol (PROVENTIL VENTOLIN) nebulizer solution 2.5 mg  2.5 mg Nebulization Q6H PRN Vanna Lerner NP   2.5 mg at 11/21/19 1600  
 alum-mag hydroxide-simeth (MYLANTA) oral suspension 30 mL  30 mL Oral Q4H PRN Andrea Zuluaga MD   30 mL at 11/30/19 7247  loperamide (IMODIUM) capsule 2 mg  2 mg Oral Q4H PRN Andrea Zuluaga MD   2 mg at 12/05/19 2145  loratadine (CLARITIN) tablet 10 mg  10 mg Oral DAILY Cari Navarro NP   10 mg at 12/06/19 0840  
 central line flush (saline) syringe 10 mL  10 mL InterCATHeter PRN Andrea Zuluaga MD   10 mL at 11/28/19 2109  
 docusate sodium (COLACE) capsule 100 mg  100 mg Oral BID PRN Casey Aguirre NP   100 mg at 11/11/19 1308  LORazepam (ATIVAN) injection 0.5 mg  0.5 mg IntraVENous Q6H PRN Andrea Zuluaga MD   0.5 mg at 11/27/19 1201  
 saline peripheral flush soln 10 mL  10 mL InterCATHeter PRN Andrea Zuluaga MD   10 mL at 11/23/19 2229  
 heparin (porcine) pf 300-500 Units  300-500 Units InterCATHeter PRN Andrea Zuluaga MD   300 Units at 12/01/19 0245  tbo-filgrastim (GRANIX) injection 480 mcg  480 mcg SubCUTAneous QHS Andrea Zuluaga MD   480 mcg at 12/05/19 2136  acyclovir (ZOVIRAX) tablet 400 mg  400 mg Oral BID Casey Aguirre NP   400 mg at 12/06/19 0839  
 allopurinol (ZYLOPRIM) tablet 300 mg  300 mg Oral DAILY Casey Aguirre NP   300 mg at 12/06/19 0900  cholecalciferol (VITAMIN D3) (400 Units /10 mcg) tablet 2 Tab  800 Units Oral DAILY Casey Aguirre NP   2 Tab at 12/06/19 1035  DULoxetine (CYMBALTA) capsule 30 mg  30 mg Oral DAILY Casey Aguirre NP 30 mg at 19 0840  lidocaine-prilocaine (EMLA) 2.5-2.5 % cream   Topical PRN [facility], NP      
 LORazepam (ATIVAN) tablet 1 mg  1 [facility], [facility]   1 mg at 19 2141  pravastatin (PRAVACHOL) tablet 10 mg  10 mg [facility], NP   10 mg at 19 2136  voriconazole (VFEND) tablet 200 mg  200 mg Oral Q12H [facility], NP   200 mg at 19 0840  ondansetron (ZOFRAN) injection 4 mg  4 mg IntraVENous Q4H PRN [facility], NP   4 mg at 19 1757  prochlorperazine (COMPAZINE) with saline injection 5 mg  5 mg IntraVENous Q6H PRN [facility], NP   5 mg at 19 1614  
 HYDROcodone-acetaminophen (NORCO) 5-325 mg per tablet 1 Tab  1 Tab Oral Q6H PRN [facility], NP   1 Tab at 19 2858  morphine injection 2 mg  2 mg IntraVENous Q4H PRN [facility], NP      
 acetaminophen (TYLENOL) tablet 650 mg  650 mg Oral Q6H PRN Ayad Neal MD   650 mg at 19 1828  diphenhydrAMINE (BENADRYL) capsule 25 mg  25 mg Oral Q6H PRN Ayad Neal MD   25 mg at 19 1356  vit C,D-Kp-roqrr-lutein-zeaxan (PRESERVISION AREDS-2) capsule 1 Cap (Patient Supplied)  1 Cap Oral DAILY Ayad Neal MD   1 Cap at 19 1512 OBJECTIVE: 
Patient Vitals for the past 8 hrs: 
 BP Temp Pulse Resp SpO2  
19 0741 126/64 98.3 °F (36.8 °C) 94 18 95 % 19 0358 136/55 99.2 °F (37.3 °C) 92 18 92 % Temp (24hrs), Av °F (37.2 °C), Min:98.3 °F (36.8 °C), Max:99.7 °F (37.6 °C) 12701 - 1900 In: 240 [P.O.:240] Out: - Physical Exam: 
Constitutional: Well developed, frail appearing female in no acute distress, sitting comfortably in bed HEENT: Normocephalic and atraumatic. Oropharynx is clear, mucous membranes are moist. Extraocular muscles are intact. Sclerae anicteric. Skin Warm and dry. Scattered rash. No erythema. No pallor. Bruising noted on BUE/R elbow.   
Respiratory Lungs with crackles in bases,  normal air exchange without accessory muscle use. On O2 via NC.  
CVS Normal rate, regular rhythm and normal S1 and S2. No murmurs, gallops, or rubs. Abdomen Soft, nontender, nondistended, normoactive bowel sounds. Neuro Grossly nonfocal with no obvious sensory or motor deficits. MSK Normal range of motion in general.  No edema and no tenderness. Psych Appropriate mood and affect. Labs:   
Recent Results (from the past 24 hour(s)) NT-PRO BNP Collection Time: 12/05/19  1:38 PM  
Result Value Ref Range NT pro- (H) 5 - 736 PG/ML  
METABOLIC PANEL, COMPREHENSIVE Collection Time: 12/06/19  3:39 AM  
Result Value Ref Range Sodium 142 136 - 145 mmol/L Potassium 3.7 3.5 - 5.1 mmol/L Chloride 107 98 - 107 mmol/L  
 CO2 28 21 - 32 mmol/L Anion gap 7 7 - 16 mmol/L Glucose 98 65 - 100 mg/dL BUN 12 8 - 23 MG/DL Creatinine 0.61 0.6 - 1.0 MG/DL  
 GFR est AA >60 >60 ml/min/1.73m2 GFR est non-AA >60 >60 ml/min/1.73m2 Calcium 8.4 8.3 - 10.4 MG/DL Bilirubin, total 0.3 0.2 - 1.1 MG/DL  
 ALT (SGPT) 20 12 - 65 U/L  
 AST (SGOT) 14 (L) 15 - 37 U/L Alk. phosphatase 77 50 - 136 U/L Protein, total 6.2 (L) 6.3 - 8.2 g/dL Albumin 2.1 (L) 3.2 - 4.6 g/dL Globulin 4.1 (H) 2.3 - 3.5 g/dL A-G Ratio 0.5 (L) 1.2 - 3.5 MAGNESIUM Collection Time: 12/06/19  3:39 AM  
Result Value Ref Range Magnesium 1.7 (L) 1.8 - 2.4 mg/dL LD Collection Time: 12/06/19  3:39 AM  
Result Value Ref Range  110 - 210 U/L  
PHOSPHORUS Collection Time: 12/06/19  3:39 AM  
Result Value Ref Range Phosphorus 3.0 2.3 - 3.7 MG/DL  
CBC WITH AUTOMATED DIFF Collection Time: 12/06/19  3:39 AM  
Result Value Ref Range WBC 0.2 (LL) 4.3 - 11.1 K/uL  
 RBC 2.60 (L) 4.05 - 5.2 M/uL HGB 7.8 (L) 11.7 - 15.4 g/dL HCT 23.4 (L) 35.8 - 46.3 % MCV 90.0 79.6 - 97.8 FL  
 MCH 30.0 26.1 - 32.9 PG  
 MCHC 33.3 31.4 - 35.0 g/dL  
 RDW 13.2 11.9 - 14.6 % PLATELET 18 (LL) 936 - 450 K/uL MPV 10.8 9.4 - 12.3 FL ABSOLUTE NRBC 0.00 0.0 - 0.2 K/uL  
 RBC COMMENTS NORMOCYTIC/NORMOCHROMIC    
 WBC COMMENTS WBC TOO FEW TO DIFFERENTIATE PLATELET COMMENTS MARKED    
 DF MANUAL Imaging: 
CT HEAD WO CONT [[id number]] Collected: 11/19/19 1050 Order Status: Completed Updated: 11/19/19 1054 Narrative:    
CT HEAD WITHOUT CONTRAST, 11/19/2019 History: Fall last night hitting left side of head Comparison: . Technique:   5 mm axial scans from the skull base to the vertex. All CT scans 
performed at this facility use one or all of the following: Automated exposure 
control, adjustment of the mA and/or kVp according to patient's size, iterative 
reconstruction. Findings:  No evidence of intracranial hemorrhage is seen. Faint bilateral basal 
ganglia calcifications are seen. No abnormal extra-axial fluid collections are 
seen.  Mild cortical involutional changes are seen which are not felt to be 
abnormal given the patient's age. Rubina Shingles evidence for acute hydrocephalus is seen. No evidence of midline shift or herniation is seen.  No abnormal edema pattern 
is seen in a vascular distribution to suggest large artery infarction. Evaluation with bone windows shows no acute osseous changes.  The visualized 
sinuses, middle ears, and mastoid air cells are well aerated. Impression:    
IMPRESSION:   
1.  No acute intracranial process evident by noncontrast CT study of the head. XR CHEST SNGL V [[id number]] Collected: 11/18/19 2041 Order Status: Completed Updated: 11/18/19 2044 Narrative:    
EXAM: XR CHEST SNGL V 
 
INDICATION: neutropenic fever COMPARISON: 9/29/2019 FINDINGS: A portable AP radiograph of the chest was obtained at 1946 hours. The 
patient is on a cardiac monitor. The lungs are clear. The cardiac and 
mediastinal contours and pulmonary vascularity are normal.  The bones and soft 
tissues are grossly within normal limits.    
Impression:    
 IMPRESSION: Normal chest.  
XR ELBOW RT MIN 3 V [[id number]] Collected: 11/10/19 1421 Order Status: Completed Updated: 11/10/19 1424 Narrative:    
Right elbow Clinical location: Elbow pain after feeling a pop, decreased range of motion FINDINGS: Four views of the right elbow show no fracture or dislocation. There 
is no joint effusion. The soft tissues are unremarkable. Impression:    
IMPRESSION: No acute osseous abnormality or joint derangement of the right 
elbow. ASSESSMENT: 
Problem List  Date Reviewed: 10/31/2019 Codes Class Noted Febrile neutropenia (HCC) ICD-10-CM: D70.9, R50.81 ICD-9-CM: 288.00, 780.61  9/21/2019 Pancytopenia due to antineoplastic chemotherapy [facility]) ICD-10-CM: D61.810, T45.1X5A 
ICD-9-CM: 284.11, E933.1  6/12/2019 Cellulitis of neck ICD-10-CM: U19.469 ICD-9-CM: 682.1  6/12/2019 Immunocompromised status associated with infection ICD-10-CM: B99.9 ICD-9-CM: 136.9  6/12/2019 Port or reservoir infection ICD-10-CM: L82.809E ICD-9-CM: 999.33  6/12/2019 [facility] ICD-10-CM: C92.00 ICD-9-CM: 205.00  5/9/2019 Admission for antineoplastic chemotherapy ICD-10-CM: Z51.11 ICD-9-CM: V58.11  5/5/2019 AML (acute myeloblastic leukemia) ([facility] [address] ICD-10-CM: C92.00 ICD-9-CM: 205.00  4/28/2019 Weakness generalized ICD-10-CM: R53.1 ICD-9-CM: 780.79  4/28/2019 Pancytopenia ([facility] ICD-10-CM: K43.251 ICD-9-CM: 284.19  4/28/2019 Thrombocytopenia ([facility] ICD-10-CM: D69.6 ICD-9-CM: 287.5  4/27/2019 Ms. Ynr Duvall is a 68 y.o. female admitted on 11/7/2019. She is a known patient of Dr. 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 She failed induction with 7+3/Midostaurin. On Dacogen/Gilteritinib with recent BMbx with persistent residual disease.    She is being admitted for FLAG-VENITA. She is feeling well and is ready to proceed with treatment. PLAN: 
AML 
- admit for salvage FLAG-VENITA 
- needs Echo prior - ordered 11/8 Day 1 FLAG-VENITA. Echo with EF 55-60%, proceed with tx. 
11/9 Day 2 FLAG-VENITA. Tolerating well, no issues. Uric acid 3.1 today. 11/10 Day 3 FLAG-VENITA. No issues with treatment. Uric acid 3.3 today. 11/12 Day 5 FLAG-VENITA. Tolerating treatment well. G-CSF to start tomorrow. 11/13 Day 6 FLAG-VENITA, doing well, Granix/claritin starts today 12/4 Day 27 FLAG VENITA. Awaiting count recovery, con't Granix. WBC up to 100 today. 12/6 Day 29. WBC up to 200. Con't Granix. Plan for repeat BMbx next week. Pancytopenia secondary to disease - Transfuse prn per Alexander SOPs R elbow pain 11/11 c/o R elbow pain with limited ROM yesterday. Xray neg. Pain improved today, noted bruising. Normal ROM on exam. 
 
Mild Abd discomfort/loose stools 11/13 discomfort is improved from yesterday, will monitor 11/14 loose stools, but not watery, can use Imodium prn 
11/15 Imodium working well  
11/16 controlled 11/26 Ova and parasite 11/22 pending. Check for C diff if stool appropriate. Continue anti diarrheal for now Neutropenic fever / Sepsis not present on admission / UTI / Strep/Enterococcus bacteremia 11/19 Tmax 102. 1.  UA +UTI. UCx pending. BCx-NGTD. CXR neg. On Cef/Vanc. DC prophylactic LVQ. 11/20 Tmax 102. BCx positive for streptococcus/enterococcus. Subculture in progress. On Cef/Vanc.   
11/22 repeat BC NTD. Alpha strep on vanc. 11/24 Repeat BC NTD. No fever. 11/25 new skin rash. ?RT to vanc. Consult ID for recommendations. 11/26 ID dc'd cefe and vanc. Started zosyn. TTE ordered. 12/2 Tmax 101. CXR neg.  UA neg. Repeat cultures. On Zosyn, restarted Vanc last PM. 
12/3 Tmax 102. Cultures NGTD. Con't Zosyn/Vanc. 12/4 Tmax 101.8.  ?r/t marrow recovery? UCx-NG. BCx-NGTD. Con't Vanc/Zosyn.   ID following - checking stool for giardia/cryptospordium and repeating CMV. 12/5 Tmax 101.9. BCx-NGTD. ID DC'd Vanc yesterday. 12/6 Afebrile. BCx-NGTD. Stool studies, CMV, and Fungitell pending. Fall 
11/19 s/p last PM.  No LOC. Hit head. CT head neg. Double vision 11/21 Neuro has seen patient. Concerns for possible 6th nerve palsy. Recommends LP. We will hold with WBC 0.0 and continue to monitor. 11/22 vision improved today. MRI brain pending. 11/23 MRI unremarkable. Discussed with Neuro. No need for LP 
11/27 Resolved Increased O2 needs / Hypoxia 12/5 O2 up to 4L via NC. Check CXR, BNP. Wt up 2# with +1.3L. Lasix 40mg IV x 1 ordered. 12/6 . CXR neg. Diuresed well. O2 down to 2L. Still with crackles, will repeat Lasix today. Continue home meds Prophylactic Antibx: Acyclovir, Voriconazole Mily SOPs SCDs for DVT prophylaxis (AC contraindicated d/t thrombocytopenia) Goals and plan of care reviewed with the patient. All questions answered to the best of our ability. Disposition: Day 29 FLAG-VENITA. WBC up to 200. Awaiting count recovery, con't Granix. Plan for repeat BMbx next week. Transfusions per mily SOPs. Upon discharge will need twice weekly labs w transfusions as needed. Weekly provider visits. RTC within 1 week from discharge. Zaida Coon NP [facility]ology [facility] On[facility] [address] [place] [address] Office : [phone] Fax : [phone]

## 2019-12-06 NOTE — PROGRESS NOTES
0715: Report given by Justina Miller RN. Patient resting comfortably in bed waiting on breakfast. Patient's family at bedside. Bed low and locked, side rails x2. Call bell within reach. Patient's voices no complaints at this time.

## 2019-12-06 NOTE — PROGRESS NOTES
END OF SHIFT NOTE: 
 
Intake/Output 701 - 1900 In: 476 [P.O.:476] Out: 5866 [SQGO] Voiding: YES Catheter: NO 
Drain:   
 
 
 
 
 
Stool: 2 occurrences. Stool Assessment Stool Color: Shoaib Batter (19 1433) Stool Appearance: Soft (19 0846) Stool Amount: Small (19 1433) Stool Source/Status: Rectum (19 0914) Emesis:  0 occurrences. VITAL SIGNS Patient Vitals for the past 12 hrs: 
 Temp Pulse Resp BP SpO2  
19 1545 99.3 °F (37.4 °C) (!) 105 18 143/64 91 % 19 1111 98.9 °F (37.2 °C) 89 18 135/56 92 % 19 0741 98.3 °F (36.8 °C) 94 18 126/64 95 % Pain Assessment Pain 1 Pain Scale 1: Numeric (0 - 10) (19 08) Pain Intensity 1: 0 (19) Patient Stated Pain Goal: 0 (19 0215) Pain Reassessment 1: Patient resting w/respiratory rate greater than 10 (19 0340) Pain Onset 1: Upon awakening (19) Pain Location 1: Head (19) Pain Orientation 1: Lower (19) Pain Description 1: Aching (19) Pain Intervention(s) 1: Medication (see MAR) (19) Ambulating Yes Additional Information: BMbx on hold as MD wants to continue monitoring count recovery. Planning for repeat BMbx next week. Patient voiced complaints of nausea and diarrhea. Zofran and Lomotil have helped tremendously according to patient, as she hasn't complained of any diarrhea since they were given and was able to finish her dinner. Shift report given to oncoming nurse Adam Baker at 2162. Colleen Benites

## 2019-12-06 NOTE — PROGRESS NOTES
END OF SHIFT NOTE: 
 
Intake/Output 12/05 1901 - 12/06 0700 In: 12 [P.O.:240; I.V.:384] Out: 1000 [Urine:1000] Voiding: YES Catheter: NO 
Drain:   
 
 
 
 
 
Stool:  0 occurrences. Stool Assessment Stool Color: Laverne Blazer (12/05/19 1433) Stool Appearance: Soft (12/05/19 1935) Stool Amount: Small (12/05/19 1433) Stool Source/Status: Rectum (12/05/19 0914) Emesis:  0 occurrences. VITAL SIGNS Patient Vitals for the past 12 hrs: 
 Temp Pulse Resp BP SpO2  
12/06/19 0358 99.2 °F (37.3 °C) 92 18 136/55 92 % 12/05/19 2337 99 °F (37.2 °C) 94 18 123/54 90 % 12/05/19 1900 99.1 °F (37.3 °C) (!) 102 19 117/58 98 % Pain Assessment Pain 1 Pain Scale 1: Numeric (0 - 10) (12/06/19 0215) Pain Intensity 1: 0 (12/06/19 0215) Patient Stated Pain Goal: 0 (12/06/19 0215) Pain Reassessment 1: Patient resting w/respiratory rate greater than 10 (12/01/19 0340) Pain Onset 1: Upon awakening (12/01/19 0242) Pain Location 1: Head (12/01/19 0242) Pain Orientation 1: Lower (12/01/19 0242) Pain Description 1: Aching (12/01/19 0242) Pain Intervention(s) 1: Medication (see MAR) (12/01/19 0242) Ambulating Yes Additional Information: VSS. Afebrile. Pt rested well during my shift. No needs voiced at this time Shift report given to oncoming nurse at the bedside.  
 
Rusty Barnett, RN 
 
 
 
 Composite Graft Text: The defect edges were debeveled with a #15 scalpel blade.  Given the location of the defect, shape of the defect, the proximity to free margins and the fact the defect was full thickness a composite graft was deemed most appropriate.  The defect was outline and then transferred to the donor site.  A full thickness graft was then excised from the donor site. The graft was then placed in the primary defect, oriented appropriately and then sutured into place.  The secondary defect was then repaired using a primary closure.

## 2019-12-07 LAB
1,3 BETA GLUCAN SER-MCNC: <31 PG/ML
ALBUMIN SERPL-MCNC: 2.1 G/DL (ref 3.2–4.6)
ALBUMIN/GLOB SERPL: 0.5 {RATIO} (ref 1.2–3.5)
ALP SERPL-CCNC: 79 U/L (ref 50–136)
ALT SERPL-CCNC: 23 U/L (ref 12–65)
ANION GAP SERPL CALC-SCNC: 6 MMOL/L (ref 7–16)
AST SERPL-CCNC: 16 U/L (ref 15–37)
BILIRUB SERPL-MCNC: 0.3 MG/DL (ref 0.2–1.1)
BUN SERPL-MCNC: 14 MG/DL (ref 8–23)
CALCIUM SERPL-MCNC: 8.3 MG/DL (ref 8.3–10.4)
CHLORIDE SERPL-SCNC: 107 MMOL/L (ref 98–107)
CO2 SERPL-SCNC: 29 MMOL/L (ref 21–32)
CREAT SERPL-MCNC: 0.68 MG/DL (ref 0.6–1)
DIFFERENTIAL METHOD BLD: ABNORMAL
ERYTHROCYTE [DISTWIDTH] IN BLOOD BY AUTOMATED COUNT: 13.1 % (ref 11.9–14.6)
GLOBULIN SER CALC-MCNC: 3.9 G/DL (ref 2.3–3.5)
GLUCOSE SERPL-MCNC: 97 MG/DL (ref 65–100)
HCT VFR BLD AUTO: 22.6 % (ref 35.8–46.3)
HGB BLD-MCNC: 7.6 G/DL (ref 11.7–15.4)
LDH SERPL L TO P-CCNC: 149 U/L (ref 110–210)
MAGNESIUM SERPL-MCNC: 1.9 MG/DL (ref 1.8–2.4)
MCH RBC QN AUTO: 30.3 PG (ref 26.1–32.9)
MCHC RBC AUTO-ENTMCNC: 33.6 G/DL (ref 31.4–35)
MCV RBC AUTO: 90 FL (ref 79.6–97.8)
NRBC # BLD: 0 K/UL (ref 0–0.2)
PHOSPHATE SERPL-MCNC: 3.2 MG/DL (ref 2.3–3.7)
PLATELET # BLD AUTO: 12 K/UL (ref 150–450)
PLATELET COMMENTS,PCOM: ABNORMAL
PMV BLD AUTO: 10.2 FL (ref 9.4–12.3)
POTASSIUM SERPL-SCNC: 3.9 MMOL/L (ref 3.5–5.1)
PROT SERPL-MCNC: 6 G/DL (ref 6.3–8.2)
RBC # BLD AUTO: 2.51 M/UL (ref 4.05–5.2)
RBC MORPH BLD: ABNORMAL
SODIUM SERPL-SCNC: 142 MMOL/L (ref 136–145)
WBC # BLD AUTO: 0.2 K/UL (ref 4.3–11.1)
WBC MORPH BLD: ABNORMAL

## 2019-12-07 PROCEDURE — 99232 SBSQ HOSP IP/OBS MODERATE 35: CPT | Performed by: INTERNAL MEDICINE

## 2019-12-07 PROCEDURE — 74011250637 HC RX REV CODE- 250/637: Performed by: NURSE PRACTITIONER

## 2019-12-07 PROCEDURE — 65270000015 HC RM PRIVATE ONCOLOGY

## 2019-12-07 PROCEDURE — 74011250636 HC RX REV CODE- 250/636: Performed by: NURSE PRACTITIONER

## 2019-12-07 PROCEDURE — 65270000029 HC RM PRIVATE

## 2019-12-07 PROCEDURE — 80053 COMPREHEN METABOLIC PANEL: CPT

## 2019-12-07 PROCEDURE — 83615 LACTATE (LD) (LDH) ENZYME: CPT

## 2019-12-07 PROCEDURE — 74011250636 HC RX REV CODE- 250/636: Performed by: INTERNAL MEDICINE

## 2019-12-07 PROCEDURE — 83735 ASSAY OF MAGNESIUM: CPT

## 2019-12-07 PROCEDURE — 84100 ASSAY OF PHOSPHORUS: CPT

## 2019-12-07 PROCEDURE — 74011000258 HC RX REV CODE- 258: Performed by: INTERNAL MEDICINE

## 2019-12-07 PROCEDURE — 74011250637 HC RX REV CODE- 250/637: Performed by: INTERNAL MEDICINE

## 2019-12-07 PROCEDURE — 85025 COMPLETE CBC W/AUTO DIFF WBC: CPT

## 2019-12-07 PROCEDURE — 74011000250 HC RX REV CODE- 250: Performed by: NURSE PRACTITIONER

## 2019-12-07 RX ADMIN — TEMAZEPAM 15 MG: 15 CAPSULE ORAL at 22:04

## 2019-12-07 RX ADMIN — VORICONAZOLE 200 MG: 200 TABLET, FILM COATED ORAL at 20:35

## 2019-12-07 RX ADMIN — ALLOPURINOL 300 MG: 300 TABLET ORAL at 08:57

## 2019-12-07 RX ADMIN — PIPERACILLIN AND TAZOBACTAM 3.38 G: 3; .375 INJECTION, POWDER, FOR SOLUTION INTRAVENOUS at 22:05

## 2019-12-07 RX ADMIN — TBO-FILGRASTIM 480 MCG: 480 INJECTION, SOLUTION SUBCUTANEOUS at 20:35

## 2019-12-07 RX ADMIN — POTASSIUM CHLORIDE 20 MEQ: 20 TABLET, EXTENDED RELEASE ORAL at 18:37

## 2019-12-07 RX ADMIN — VORICONAZOLE 200 MG: 200 TABLET, FILM COATED ORAL at 08:57

## 2019-12-07 RX ADMIN — LORATADINE 10 MG: 10 TABLET ORAL at 08:57

## 2019-12-07 RX ADMIN — ACYCLOVIR 400 MG: 800 TABLET ORAL at 08:57

## 2019-12-07 RX ADMIN — DRONABINOL 2.5 MG: 2.5 CAPSULE ORAL at 08:57

## 2019-12-07 RX ADMIN — PROCHLORPERAZINE EDISYLATE 5 MG: 5 INJECTION INTRAMUSCULAR; INTRAVENOUS at 11:07

## 2019-12-07 RX ADMIN — Medication 2 TABLET: at 08:57

## 2019-12-07 RX ADMIN — LORAZEPAM 1 MG: 1 TABLET ORAL at 22:04

## 2019-12-07 RX ADMIN — DULOXETINE 30 MG: 30 CAPSULE, DELAYED RELEASE ORAL at 08:57

## 2019-12-07 RX ADMIN — ONDANSETRON 4 MG: 2 INJECTION INTRAMUSCULAR; INTRAVENOUS at 20:35

## 2019-12-07 RX ADMIN — CAMPHOR AND MENTHOL: 5; 5 LOTION TOPICAL at 08:58

## 2019-12-07 RX ADMIN — ACYCLOVIR 400 MG: 800 TABLET ORAL at 18:37

## 2019-12-07 RX ADMIN — POTASSIUM CHLORIDE 20 MEQ: 20 TABLET, EXTENDED RELEASE ORAL at 08:58

## 2019-12-07 RX ADMIN — DRONABINOL 2.5 MG: 2.5 CAPSULE ORAL at 18:37

## 2019-12-07 RX ADMIN — PRAVASTATIN SODIUM 10 MG: 20 TABLET ORAL at 20:35

## 2019-12-07 RX ADMIN — CAMPHOR AND MENTHOL: 5; 5 LOTION TOPICAL at 20:36

## 2019-12-07 RX ADMIN — PIPERACILLIN AND TAZOBACTAM 3.38 G: 3; .375 INJECTION, POWDER, FOR SOLUTION INTRAVENOUS at 15:27

## 2019-12-07 RX ADMIN — PIPERACILLIN AND TAZOBACTAM 3.38 G: 3; .375 INJECTION, POWDER, FOR SOLUTION INTRAVENOUS at 05:49

## 2019-12-07 RX ADMIN — CAMPHOR AND MENTHOL: 5; 5 LOTION TOPICAL at 16:00

## 2019-12-07 NOTE — PROGRESS NOTES
[employer] [facility] Inpatient Hematology / Oncology Daily Progress Note Reason for Admission:  Admission for antineoplastic chemotherapy [Z51.11] 24 Hour Events: 
Afebrile, VSS Day 30 FLAG-VENITA  Awaiting count recovery, on Granix  at bedside ROS: 
Constitutional: +fatigue Negative for fever. CV: Negative for chest pain, palpitations, edema. Respiratory: Negative for dyspnea, cough, wheezing. GI: Negative for nausea, abdominal pain. +Diarrhea- improved. 10 point review of systems is otherwise negative with the exception of the elements mentioned above in the HPI. No Known Allergies Past Medical History:  
Diagnosis Date  AML (acute myeloid leukemia) ([facility] [address] dx- 4/2019  
 followed by dr Mike Eisenberg  Depression  Hypercholesterolemia  Infection   
 of port -- was placed 5/2019-- removed 6/2019---right chest  
 Psychiatric disorder   
 aniexty  Sleep apnea Past Surgical History:  
Procedure Laterality Date  HX OTHER SURGICAL    
 colonoscopy  HX VASCULAR ACCESS    
 IR INSERT TUNL CVC W PORT OVER 5 YEARS  4/30/2019  IR INSERT TUNL CVC W PORT OVER 5 YEARS  7/15/2019  IR REMOVE TUNL CVAD W PORT/PUMP  6/13/2019 Family History Problem Relation Age of Onset  Cancer Father Social History Socioeconomic History  Marital status:  Spouse name: Not on file  Number of children: Not on file  Years of education: Not on file  Highest education level: Not on file Occupational History  Not on file Social Needs  Financial resource strain: Not on file  Food insecurity:  
  Worry: Not on file Inability: Not on file  Transportation needs:  
  Medical: Not on file Non-medical: Not on file Tobacco Use  Smoking status: Never Smoker  Smokeless tobacco: Never Used Substance and Sexual Activity  Alcohol use: Never Frequency: Never  Drug use: Never  Sexual activity: Not on file Lifestyle  Physical activity:  
  Days per week: Not on file Minutes per session: Not on file  Stress: Not on file Relationships  Social connections:  
  Talks on phone: Not on file Gets together: Not on file Attends Confucianism service: Not on file Active member of club or organization: Not on file Attends meetings of clubs or organizations: Not on file Relationship status: Not on file  Intimate partner violence:  
  Fear of current or ex partner: Not on file Emotionally abused: Not on file Physically abused: Not on file Forced sexual activity: Not on file Other Topics Concern [address] Service Not Asked  Blood Transfusions Not Asked  Caffeine Concern Not Asked  Occupational Exposure Not Asked [place] Hazards Not Asked  Sleep Concern Not Asked  Stress Concern Not Asked  Weight Concern Not Asked  Special Diet Not Asked  Back Care Not Asked  Exercise Not Asked  Bike Helmet Not Asked  Seat Belt Not Asked  Self-Exams Not Asked Social History Narrative  Not on file Current Facility-Administered Medications Medication Dose Route Frequency Provider Last Rate Last Dose  diphenoxylate-atropine (LOMOTIL) tablet 1 Tab  1 Tab Oral QID PRN Yaneth Estevez MD   1 Tab at 12/06/19 1434  
 magnesium oxide (MAG-OX) tablet 400 mg  400 mg Oral BID Leigh Hashimoto, NP   Stopped at 12/07/19 9097  dronabinol (MARINOL) capsule 2.5 mg  2.5 mg Oral ACB&D Leigh Hashimoto, NP   2.5 mg at 12/07/19 0857  
 0.9% sodium chloride infusion 250 mL  250 mL IntraVENous PRN Jeremi Hurtado MD      
 0.9% sodium chloride infusion 250 mL  250 mL IntraVENous PRN Queba MD [facility]      
 acetaminophen/diphenhydrAMINE (TYLENOL PM EXT STR) 500/25 mg (Patient Supplied)   Oral QHS PRN Yelitza Young NP      
 temazepam (RESTORIL) capsule 15 mg  15 mg Oral QHS Aidan Partida NP   15 mg at 12/06/19 2202  camphor-menthol (SARNA) 0.5-0.5 % lotion   Topical TID Juan C Campbell NP      
 piperacillin-tazobactam (ZOSYN) 3.375 g in 0.9% sodium chloride (MBP/ADV) 100 mL  3.375 g IntraVENous Q8H Howie Olszewski, MD 25 mL/hr at 12/07/19 0549 3.375 g at 12/07/19 0549  
 baclofen (LIORESAL) tablet 5 mg  5 mg Oral Q8H PRN Malen Balloon, MD   5 mg at 11/21/19 1335  potassium chloride (K-DUR, KLOR-CON) SR tablet 20 mEq  20 mEq Oral BID Malen Balloon, MD   20 mEq at 12/07/19 8364  albuterol (PROVENTIL VENTOLIN) nebulizer solution 2.5 mg  2.5 mg Nebulization Q6H PRN Christine Jay NP   2.5 mg at 11/21/19 1600  
 alum-mag hydroxide-simeth (MYLANTA) oral suspension 30 mL  30 mL Oral Q4H PRN Malen Balloon, MD   30 mL at 11/30/19 3038  loperamide (IMODIUM) capsule 2 mg  2 mg Oral Q4H PRN Malen Balloon, MD   2 mg at 12/06/19 2212  loratadine (CLARITIN) tablet 10 mg  10 mg Oral DAILY Loidaterri Smith NP   10 mg at 12/07/19 8367  central line flush (saline) syringe 10 mL  10 mL InterCATHeter PRN Malemarkus Balloon, MD   10 mL at 11/28/19 2109  
 docusate sodium (COLACE) capsule 100 mg  100 mg Oral BID PRN Rai Torres NP   100 mg at 11/11/19 1308  LORazepam (ATIVAN) injection 0.5 mg  0.5 mg IntraVENous Q6H PRN Malen Balloon, MD   0.5 mg at 11/27/19 1201  
 saline peripheral flush soln 10 mL  10 mL InterCATHeter PRN Malen Balloon, MD   10 mL at 11/23/19 2229  
 heparin (porcine) pf 300-500 Units  300-500 Units InterCATHeter PRN Malen Balloon, MD   300 Units at 12/01/19 0245  tbo-filgrastim (GRANIX) injection 480 mcg  480 mcg SubCUTAneous QHS Malen Balloon, MD   480 mcg at 12/06/19 2202  
 acyclovir (ZOVIRAX) tablet 400 mg  400 mg Oral BID Rai Torres NP   400 mg at 12/07/19 0199  allopurinol (ZYLOPRIM) tablet 300 mg  300 mg Oral DAILY Rai Torres NP   300 mg at 12/07/19 2896  cholecalciferol (VITAMIN D3) (400 Units /10 mcg) tablet 2 Tab  800 Units Oral DAILY Rai Torres NP   2 Tab at 12/07/19 6166  DULoxetine (CYMBALTA) capsule 30 mg  30 mg Oral DAILY Brittany Julio NP   30 mg at 19 1325  lidocaine-prilocaine (EMLA) 2.5-2.5 % cream   Topical PRN Brittany Julio NP      
 LORazepam (ATIVAN) tablet 1 mg  1 mg Oral QHS Brittany Julio, NP   1 mg at 19 2202  pravastatin (PRAVACHOL) tablet 10 mg  10 mg Oral QHS Brittany Julio, NP   10 mg at 19 2202  voriconazole (VFEND) tablet 200 mg  200 mg Oral Q12H Brittany Julio, NP   200 mg at 19 5574  ondansetron (ZOFRAN) injection 4 mg  4 mg IntraVENous Q4H PRN Brittany Julio NP   4 mg at 19 1650  prochlorperazine (COMPAZINE) with saline injection 5 mg  5 mg IntraVENous Q6H PRN Brittany Julio NP   5 mg at 19 1614  
 HYDROcodone-acetaminophen (NORCO) 5-325 mg per tablet 1 Tab  1 Tab Oral Q6H PRN Brittany Julio NP   1 Tab at 19 1581  morphine injection 2 mg  2 mg IntraVENous Q4H PRN Brittany Julio NP      
 acetaminophen (TYLENOL) tablet 650 mg  650 mg Oral Q6H PRN Viki Pearl MD   650 mg at 19 1552  diphenhydrAMINE (BENADRYL) capsule 25 mg  25 mg Oral Q6H PRN Viki Pearl MD   25 mg at 19 1356  vit C,E-De-qhqvb-lutein-zeaxan (PRESERVISION AREDS-2) capsule 1 Cap (Patient Supplied)  1 Cap Oral DAILY Viki Pearl MD   1 Cap at 19 4071 OBJECTIVE: 
Patient Vitals for the past 8 hrs: 
 BP Temp Pulse Resp SpO2  
19 0720 108/62 97.7 °F (36.5 °C) (!) 110 18 93 % 19 0402 130/56 99.2 °F (37.3 °C) 93 18 90 % Temp (24hrs), Av °F (37.2 °C), Min:97.7 °F (36.5 °C), Max:99.7 °F (37.6 °C) 
 
701 -  1900 In: 80 [I.V.:88] Out: 350 [Urine:350] Physical Exam: 
Constitutional: Well developed, frail appearing female in no acute distress, sitting comfortably in bed HEENT: Normocephalic and atraumatic. Oropharynx is clear, mucous membranes are moist. Extraocular muscles are intact. Sclerae anicteric. Skin Warm and dry. Scattered rash. No erythema. No pallor.  Bruising noted on BUE/R elbow. Respiratory Lungs CTAB,  normal air exchange without accessory muscle use. On O2 via NC.  
CVS Normal rate, regular rhythm and normal S1 and S2. No murmurs, gallops, or rubs. Abdomen Soft, nontender, nondistended, normoactive bowel sounds. Neuro Grossly nonfocal with no obvious sensory or motor deficits. MSK Normal range of motion in general.  No edema and no tenderness. Psych Appropriate mood and affect. Labs:   
Recent Results (from the past 24 hour(s)) METABOLIC PANEL, COMPREHENSIVE Collection Time: 12/07/19  3:52 AM  
Result Value Ref Range Sodium 142 136 - 145 mmol/L Potassium 3.9 3.5 - 5.1 mmol/L Chloride 107 98 - 107 mmol/L  
 CO2 29 21 - 32 mmol/L Anion gap 6 (L) 7 - 16 mmol/L Glucose 97 65 - 100 mg/dL BUN 14 8 - 23 MG/DL Creatinine 0.68 0.6 - 1.0 MG/DL  
 GFR est AA >60 >60 ml/min/1.73m2 GFR est non-AA >60 >60 ml/min/1.73m2 Calcium 8.3 8.3 - 10.4 MG/DL Bilirubin, total 0.3 0.2 - 1.1 MG/DL  
 ALT (SGPT) 23 12 - 65 U/L  
 AST (SGOT) 16 15 - 37 U/L Alk. phosphatase 79 50 - 136 U/L Protein, total 6.0 (L) 6.3 - 8.2 g/dL Albumin 2.1 (L) 3.2 - 4.6 g/dL Globulin 3.9 (H) 2.3 - 3.5 g/dL A-G Ratio 0.5 (L) 1.2 - 3.5 MAGNESIUM Collection Time: 12/07/19  3:52 AM  
Result Value Ref Range Magnesium 1.9 1.8 - 2.4 mg/dL LD Collection Time: 12/07/19  3:52 AM  
Result Value Ref Range  110 - 210 U/L  
PHOSPHORUS Collection Time: 12/07/19  3:52 AM  
Result Value Ref Range Phosphorus 3.2 2.3 - 3.7 MG/DL  
CBC WITH AUTOMATED DIFF Collection Time: 12/07/19  3:52 AM  
Result Value Ref Range WBC 0.2 (LL) 4.3 - 11.1 K/uL  
 RBC 2.51 (L) 4.05 - 5.2 M/uL HGB 7.6 (L) 11.7 - 15.4 g/dL HCT 22.6 (L) 35.8 - 46.3 % MCV 90.0 79.6 - 97.8 FL  
 MCH 30.3 26.1 - 32.9 PG  
 MCHC 33.6 31.4 - 35.0 g/dL  
 RDW 13.1 11.9 - 14.6 % PLATELET 12 (LL) 768 - 450 K/uL  MPV 10.2 9.4 - 12.3 FL  
 ABSOLUTE NRBC 0.00 0.0 - 0.2 K/uL  
 RBC COMMENTS NORMOCYTIC/NORMOCHROMIC    
 WBC COMMENTS WBC TOO FEW TO DIFFERENTIATE PLATELET COMMENTS MARKED    
 DF MANUAL Imaging: 
CT HEAD WO CONT [[id number]] Collected: 11/19/19 1050 Order Status: Completed Updated: 11/19/19 1054 Narrative:    
CT HEAD WITHOUT CONTRAST, 11/19/2019 History: Fall last night hitting left side of head Comparison: . Technique:   5 mm axial scans from the skull base to the vertex. All CT scans 
performed at this facility use one or all of the following: Automated exposure 
control, adjustment of the mA and/or kVp according to patient's size, iterative 
reconstruction. Findings:  No evidence of intracranial hemorrhage is seen. Faint bilateral basal 
ganglia calcifications are seen. No abnormal extra-axial fluid collections are 
seen.  Mild cortical involutional changes are seen which are not felt to be 
abnormal given the patient's age. [place] evidence for acute hydrocephalus is seen. No evidence of midline shift or herniation is seen.  No abnormal edema pattern 
is seen in a vascular distribution to suggest large artery infarction. Evaluation with bone windows shows no acute osseous changes.  The visualized 
sinuses, middle ears, and mastoid air cells are well aerated. Impression:    
IMPRESSION:   
1.  No acute intracranial process evident by noncontrast CT study of the head. XR CHEST SNGL V [[id number]] Collected: 11/18/19 2041 Order Status: Completed Updated: 11/18/19 2044 Narrative:    
EXAM: XR CHEST SNGL V 
 
INDICATION: neutropenic fever COMPARISON: 9/29/2019 FINDINGS: A portable AP radiograph of the chest was obtained at 1946 hours. The 
patient is on a cardiac monitor. The lungs are clear. The cardiac and 
mediastinal contours and pulmonary vascularity are normal.  The bones and soft 
tissues are grossly within normal limits.    
Impression:    
IMPRESSION: Normal chest.  
 XR ELBOW RT MIN 3 V [[id number]] Collected: 11/10/19 1421 Order Status: Completed Updated: 11/10/19 1424 Narrative:    
Right elbow Clinical location: Elbow pain after feeling a pop, decreased range of motion FINDINGS: Four views of the right elbow show no fracture or dislocation. There 
is no joint effusion. The soft tissues are unremarkable. Impression:    
IMPRESSION: No acute osseous abnormality or joint derangement of the right 
elbow. ASSESSMENT: 
Problem List  Date Reviewed: 10/31/2019 Codes Class Noted Febrile neutropenia (HCC) ICD-10-CM: D70.9, R50.81 ICD-9-CM: 288.00, 780.61  9/21/2019 Pancytopenia due to antineoplastic chemotherapy [facility]) ICD-10-CM: D61.810, T45.1X5A 
ICD-9-CM: 284.11, E933.1  6/12/2019 Cellulitis of neck ICD-10-CM: W99.639 ICD-9-CM: 682.1  6/12/2019 Immunocompromised status associated with infection ICD-10-CM: B99.9 ICD-9-CM: 136.9  6/12/2019 Port or reservoir infection ICD-10-CM: I97.131S ICD-9-CM: 999.33  6/12/2019 [facility] myeloid [facility] not having achieved [facility] ICD-10-CM: C92.00 ICD-9-CM: 205.00  5/9/2019 Admission for antineoplastic chemotherapy ICD-10-CM: Z51.11 ICD-9-CM: V58.11  5/5/2019 AML (acute myeloblastic leukemia) ([facility] ICD-10-CM: C92.00 ICD-9-CM: 205.00  4/28/2019 Weakness generalized ICD-10-CM: R53.1 ICD-9-CM: 780.79  4/28/2019 Pancytopenia ([facility] ICD-10-CM: V73.677 ICD-9-CM: 284.19  4/28/2019 Thrombocytopenia ([facility] ICD-10-CM: D69.6 ICD-9-CM: 287.5  4/27/2019 Ms. Irena Hung is a 68 y.o. female admitted on 11/7/2019. She is a known patient of Dr. 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 She failed induction with 7+3/Midostaurin. On Dacogen/Gilteritinib with recent BMbx with persistent residual disease. She is being admitted for FLAG-VENITA. She is feeling well and is ready to proceed with treatment. PLAN: 
AML - admit for salvage FLAG-VENITA 
- needs Echo prior - ordered 11/8 Day 1 FLAG-VENITA. Echo with EF 55-60%, proceed with tx. 
11/9 Day 2 FLAG-VENITA. Tolerating well, no issues. Uric acid 3.1 today. 11/10 Day 3 FLAG-VENITA. No issues with treatment. Uric acid 3.3 today. 11/12 Day 5 FLAG-VENITA. Tolerating treatment well. G-CSF to start tomorrow. 11/13 Day 6 FLAG-VENITA, doing well, Granix/claritin starts today 12/4 Day 27 FLAG VENITA. Awaiting count recovery, con't Granix. WBC up to 100 today. 12/7 Day 30. . Con't Granix. Plan for repeat BMbx next week. Pancytopenia secondary to disease - Transfuse prn per [facility] SOPs R elbow pain 11/11 c/o R elbow pain with limited ROM yesterday. Xray neg. Pain improved today, noted bruising. Normal ROM on exam. 
 
Mild Abd discomfort/loose stools 11/13 discomfort is improved from yesterday, will monitor 11/14 loose stools, but not watery, can use Imodium prn 
11/15 Imodium working well  
11/16 controlled 11/26 Ova and parasite 11/22 pending. Check for C diff if stool appropriate. Continue anti diarrheal for now Neutropenic fever / Sepsis not present on admission / UTI / Strep/Enterococcus bacteremia 11/19 Tmax 102. 1.  UA +UTI. UCx pending. BCx-NGTD. CXR neg. On Cef/Vanc. DC prophylactic LVQ. 11/20 Tmax 102. BCx positive for streptococcus/enterococcus. Subculture in progress. On Cef/Vanc.   
11/22 repeat BC NTD. Alpha strep on vanc. 11/24 Repeat BC NTD. No fever. 11/25 new skin rash. ?RT to vanc. Consult ID for recommendations. 11/26 ID dc'd cefe and vanc. Started zosyn. TTE ordered. 12/2 Tmax 101. CXR neg.  UA neg. Repeat cultures. On Zosyn, restarted Vanc last PM. 
12/3 Tmax 102. Cultures NGTD. Con't Zosyn/Vanc. 12/4 Tmax 101.8.  ?r/t marrow recovery? UCx-NG. BCx-NGTD. Con't Vanc/Zosyn. ID following - checking stool for giardia/cryptospordium and repeating CMV. 12/5 Tmax 101.9. BCx-NGTD. ID DC'd Vanc yesterday. 12/7 Afebrile. BCx-NGTD. Stool studies and CMV neg. Fungitell pending. Con't Zosyn. ID following. Fall 
11/19 s/p last PM.  No LOC. Hit head. CT head neg. Double vision 11/21 Neuro has seen patient. Concerns for possible 6th nerve palsy. Recommends LP. We will hold with WBC 0.0 and continue to monitor. 11/22 vision improved today. MRI brain pending. 11/23 MRI unremarkable. Discussed with Neuro. No need for LP 
11/27 Resolved Increased O2 needs / Hypoxia 12/5 O2 up to 4L via NC. Check CXR, BNP. Wt up 2# with +1.3L. Lasix 40mg IV x 1 ordered. 12/6 . CXR neg. Diuresed well. O2 down to 2L. Still with crackles, will repeat Lasix today. 12/7 Resolved. On RA Continue home meds Prophylactic Antibx: Acyclovir, Voriconazole Mily SOPs SCDs for DVT prophylaxis (AC contraindicated d/t thrombocytopenia) Goals and plan of care reviewed with the patient. All questions answered to the best of our ability. Disposition: Day 30 FLAG-VENITA. . Awaiting count recovery, con't Granix. Plan for repeat BMbx next week. Transfusions per mily SOPs. Upon discharge will need twice weekly labs w transfusions as needed. Weekly provider visits. RTC within 1 week from discharge. Martínez Tomas NP [facility] [id number] [place] [address] Office : [phone] Fax : [phone]

## 2019-12-07 NOTE — PROGRESS NOTES
Problem: Falls - Risk of 
Goal: *Absence of Falls Description Document Donata Carrel Fall Risk and appropriate interventions in the flowsheet. Outcome: Progressing Towards Goal 
Note: Fall Risk Interventions: 
Mobility Interventions: Communicate number of staff needed for ambulation/transfer Medication Interventions: Patient to call before getting OOB Elimination Interventions: Call light in reach History of Falls Interventions: Investigate reason for fall

## 2019-12-07 NOTE — PROGRESS NOTES
Problem: Falls - Risk of 
Goal: *Absence of Falls Description Document Devere Vina Fall Risk and appropriate interventions in the flowsheet. Outcome: Progressing Towards Goal 
Note: Fall Risk Interventions: 
Mobility Interventions: Communicate number of staff needed for ambulation/transfer Medication Interventions: Patient to call before getting OOB Elimination Interventions: Call light in reach History of Falls Interventions: Investigate reason for fall

## 2019-12-07 NOTE — PROGRESS NOTES
END OF SHIFT NOTE: 
 
Intake/Output 12/06 1901 - 12/07 0700 In: 5 [P.O.:240; I.V.:347] Out: 300 [Urine:300] Voiding: YES Catheter: NO 
Drain:   
 
 
 
 
 
Stool:  0 occurrences. Stool Assessment Stool Color: Martin Simmonds (12/05/19 1433) Stool Appearance: Loose; Soft (12/1945) Stool Amount: Small (12/05/19 1433) Stool Source/Status: Rectum (12/05/19 0914) Emesis:  0 occurrences. VITAL SIGNS Patient Vitals for the past 12 hrs: 
 Temp Pulse Resp BP SpO2  
12/07/19 0402 99.2 °F (37.3 °C) 93 18 130/56 90 % 12/06/19 2340 99.7 °F (37.6 °C) (!) 102 18 140/55 94 % 12/06/19 2006 99 °F (37.2 °C) (!) 111 18 106/56 90 % Pain Assessment Pain 1 Pain Scale 1: Numeric (0 - 10) (12/07/19 0230) Pain Intensity 1: 0 (12/07/19 0230) Patient Stated Pain Goal: 0 (12/07/19 0230) Pain Reassessment 1: Patient resting w/respiratory rate greater than 10 (12/01/19 0340) Pain Onset 1: Upon awakening (12/01/19 0242) Pain Location 1: Head (12/01/19 0242) Pain Orientation 1: Lower (12/01/19 0242) Pain Description 1: Aching (12/01/19 0242) Pain Intervention(s) 1: Medication (see MAR) (12/01/19 0242) Ambulating Yes Additional Information: VSS. Afebrile. No needs voiced at this time Shift report given to oncoming nurse at the bedside.  
 
Walter Ho RN

## 2019-12-08 LAB
ALBUMIN SERPL-MCNC: 2.1 G/DL (ref 3.2–4.6)
ALBUMIN/GLOB SERPL: 0.6 {RATIO} (ref 1.2–3.5)
ALP SERPL-CCNC: 78 U/L (ref 50–136)
ALT SERPL-CCNC: 24 U/L (ref 12–65)
ANION GAP SERPL CALC-SCNC: 5 MMOL/L (ref 7–16)
AST SERPL-CCNC: 15 U/L (ref 15–37)
BILIRUB SERPL-MCNC: 0.3 MG/DL (ref 0.2–1.1)
BUN SERPL-MCNC: 12 MG/DL (ref 8–23)
CALCIUM SERPL-MCNC: 8.4 MG/DL (ref 8.3–10.4)
CHLORIDE SERPL-SCNC: 109 MMOL/L (ref 98–107)
CO2 SERPL-SCNC: 29 MMOL/L (ref 21–32)
CREAT SERPL-MCNC: 0.61 MG/DL (ref 0.6–1)
DIFFERENTIAL METHOD BLD: ABNORMAL
ERYTHROCYTE [DISTWIDTH] IN BLOOD BY AUTOMATED COUNT: 12.8 % (ref 11.9–14.6)
GLOBULIN SER CALC-MCNC: 3.6 G/DL (ref 2.3–3.5)
GLUCOSE SERPL-MCNC: 89 MG/DL (ref 65–100)
HCT VFR BLD AUTO: 22 % (ref 35.8–46.3)
HGB BLD-MCNC: 7.4 G/DL (ref 11.7–15.4)
LDH SERPL L TO P-CCNC: 151 U/L (ref 110–210)
MAGNESIUM SERPL-MCNC: 2.1 MG/DL (ref 1.8–2.4)
MCH RBC QN AUTO: 30.5 PG (ref 26.1–32.9)
MCHC RBC AUTO-ENTMCNC: 33.6 G/DL (ref 31.4–35)
MCV RBC AUTO: 90.5 FL (ref 79.6–97.8)
NRBC # BLD: 0 K/UL (ref 0–0.2)
PHOSPHATE SERPL-MCNC: 3.1 MG/DL (ref 2.3–3.7)
PLATELET # BLD AUTO: 7 K/UL (ref 150–450)
PLATELET COMMENTS,PCOM: ABNORMAL
PMV BLD AUTO: 12.9 FL (ref 9.4–12.3)
POTASSIUM SERPL-SCNC: 4 MMOL/L (ref 3.5–5.1)
PROT SERPL-MCNC: 5.7 G/DL (ref 6.3–8.2)
RBC # BLD AUTO: 2.43 M/UL (ref 4.05–5.2)
RBC MORPH BLD: ABNORMAL
SODIUM SERPL-SCNC: 143 MMOL/L (ref 136–145)
WBC # BLD AUTO: 0.3 K/UL (ref 4.3–11.1)
WBC MORPH BLD: ABNORMAL

## 2019-12-08 PROCEDURE — 74011250636 HC RX REV CODE- 250/636: Performed by: NURSE PRACTITIONER

## 2019-12-08 PROCEDURE — 84100 ASSAY OF PHOSPHORUS: CPT

## 2019-12-08 PROCEDURE — 74011250636 HC RX REV CODE- 250/636: Performed by: INTERNAL MEDICINE

## 2019-12-08 PROCEDURE — 65270000015 HC RM PRIVATE ONCOLOGY

## 2019-12-08 PROCEDURE — 74011250637 HC RX REV CODE- 250/637: Performed by: NURSE PRACTITIONER

## 2019-12-08 PROCEDURE — 36591 DRAW BLOOD OFF VENOUS DEVICE: CPT

## 2019-12-08 PROCEDURE — 80053 COMPREHEN METABOLIC PANEL: CPT

## 2019-12-08 PROCEDURE — 65270000029 HC RM PRIVATE

## 2019-12-08 PROCEDURE — 83735 ASSAY OF MAGNESIUM: CPT

## 2019-12-08 PROCEDURE — 36430 TRANSFUSION BLD/BLD COMPNT: CPT

## 2019-12-08 PROCEDURE — P9037 PLATE PHERES LEUKOREDU IRRAD: HCPCS

## 2019-12-08 PROCEDURE — 74011000258 HC RX REV CODE- 258: Performed by: INTERNAL MEDICINE

## 2019-12-08 PROCEDURE — 83615 LACTATE (LD) (LDH) ENZYME: CPT

## 2019-12-08 PROCEDURE — 74011250637 HC RX REV CODE- 250/637: Performed by: INTERNAL MEDICINE

## 2019-12-08 PROCEDURE — 99232 SBSQ HOSP IP/OBS MODERATE 35: CPT | Performed by: INTERNAL MEDICINE

## 2019-12-08 PROCEDURE — 85025 COMPLETE CBC W/AUTO DIFF WBC: CPT

## 2019-12-08 RX ADMIN — POTASSIUM CHLORIDE 20 MEQ: 20 TABLET, EXTENDED RELEASE ORAL at 07:40

## 2019-12-08 RX ADMIN — VORICONAZOLE 200 MG: 200 TABLET, FILM COATED ORAL at 07:40

## 2019-12-08 RX ADMIN — DRONABINOL 2.5 MG: 2.5 CAPSULE ORAL at 07:39

## 2019-12-08 RX ADMIN — ACETAMINOPHEN 650 MG: 325 TABLET, FILM COATED ORAL at 13:06

## 2019-12-08 RX ADMIN — CAMPHOR AND MENTHOL: 5; 5 LOTION TOPICAL at 16:13

## 2019-12-08 RX ADMIN — TEMAZEPAM 15 MG: 15 CAPSULE ORAL at 21:28

## 2019-12-08 RX ADMIN — PRAVASTATIN SODIUM 10 MG: 20 TABLET ORAL at 21:28

## 2019-12-08 RX ADMIN — TBO-FILGRASTIM 480 MCG: 480 INJECTION, SOLUTION SUBCUTANEOUS at 21:29

## 2019-12-08 RX ADMIN — ONDANSETRON 4 MG: 2 INJECTION INTRAMUSCULAR; INTRAVENOUS at 21:28

## 2019-12-08 RX ADMIN — POTASSIUM CHLORIDE 20 MEQ: 20 TABLET, EXTENDED RELEASE ORAL at 17:58

## 2019-12-08 RX ADMIN — LORATADINE 10 MG: 10 TABLET ORAL at 07:39

## 2019-12-08 RX ADMIN — PIPERACILLIN AND TAZOBACTAM 3.38 G: 3; .375 INJECTION, POWDER, FOR SOLUTION INTRAVENOUS at 06:08

## 2019-12-08 RX ADMIN — Medication 2 TABLET: at 07:40

## 2019-12-08 RX ADMIN — ALLOPURINOL 300 MG: 300 TABLET ORAL at 07:40

## 2019-12-08 RX ADMIN — CAMPHOR AND MENTHOL: 5; 5 LOTION TOPICAL at 07:44

## 2019-12-08 RX ADMIN — Medication 400 MG: at 17:58

## 2019-12-08 RX ADMIN — DIPHENHYDRAMINE HYDROCHLORIDE 25 MG: 25 CAPSULE ORAL at 13:06

## 2019-12-08 RX ADMIN — VORICONAZOLE 200 MG: 200 TABLET, FILM COATED ORAL at 21:28

## 2019-12-08 RX ADMIN — ACYCLOVIR 400 MG: 800 TABLET ORAL at 07:40

## 2019-12-08 RX ADMIN — Medication 400 MG: at 07:40

## 2019-12-08 RX ADMIN — PIPERACILLIN AND TAZOBACTAM 3.38 G: 3; .375 INJECTION, POWDER, FOR SOLUTION INTRAVENOUS at 21:28

## 2019-12-08 RX ADMIN — ACYCLOVIR 400 MG: 800 TABLET ORAL at 17:58

## 2019-12-08 RX ADMIN — DRONABINOL 2.5 MG: 2.5 CAPSULE ORAL at 16:13

## 2019-12-08 RX ADMIN — LORAZEPAM 1 MG: 1 TABLET ORAL at 21:28

## 2019-12-08 RX ADMIN — DULOXETINE 30 MG: 30 CAPSULE, DELAYED RELEASE ORAL at 07:40

## 2019-12-08 RX ADMIN — PIPERACILLIN AND TAZOBACTAM 3.38 G: 3; .375 INJECTION, POWDER, FOR SOLUTION INTRAVENOUS at 13:06

## 2019-12-08 NOTE — PROGRESS NOTES
[facility] Hematology & Oncology Inpatient Hematology / Oncology Daily Progress Note Reason for Admission:  Admission for antineoplastic chemotherapy [Z51.11] 24 Hour Events: 
Afebrile, VSS Day 31 FLAG-VENITA WBC up to 300 Awaiting count recovery, on Granix States she feels better today  at bedside ROS: 
Constitutional: Negative for fever, fatigue. CV: Negative for chest pain, palpitations, edema. Respiratory: Negative for dyspnea, cough, wheezing. GI: Negative for nausea, abdominal pain. +Diarrhea- improved. 10 point review of systems is otherwise negative with the exception of the elements mentioned above in the HPI. No Known Allergies Past Medical History:  
Diagnosis Date  AML (acute myeloid leukemia) ([facility] [address] dx- 4/2019  
 followed by dr Stef Carroll  Depression  Hypercholesterolemia  Infection   
 of port -- was placed 5/2019-- removed 6/2019---right chest  
 Psychiatric disorder   
 aniexty  Sleep apnea Past Surgical History:  
Procedure Laterality Date  HX OTHER SURGICAL    
 colonoscopy  HX VASCULAR ACCESS    
 IR INSERT TUNL CVC W PORT OVER 5 YEARS  4/30/2019  IR INSERT TUNL CVC W PORT OVER 5 YEARS  7/15/2019  IR REMOVE TUNL CVAD W PORT/PUMP  6/13/2019 Family History Problem Relation Age of Onset  Cancer Father Social History Socioeconomic History  Marital status:  Spouse name: Not on file  Number of children: Not on file  Years of education: Not on file  Highest education level: Not on file Occupational History  Not on file Social Needs  Financial resource strain: Not on file  Food insecurity:  
  Worry: Not on file Inability: Not on file  Transportation needs:  
  Medical: Not on file Non-medical: Not on file Tobacco Use  Smoking status: Never Smoker  Smokeless tobacco: Never Used Substance and Sexual Activity  Alcohol use: Never Frequency: Never  Drug use: Never  Sexual activity: Not on file Lifestyle  Physical activity:  
  Days per week: Not on file Minutes per session: Not on file  Stress: Not on file Relationships  Social connections:  
  Talks on phone: Not on file Gets together: Not on file Attends Hindu service: Not on file Active member of club or organization: Not on file Attends meetings of clubs or organizations: Not on file Relationship status: Not on file  Intimate partner violence:  
  Fear of current or ex partner: Not on file Emotionally abused: Not on file Physically abused: Not on file Forced sexual activity: Not on file Other Topics Concern [address] Service Not Asked  Blood Transfusions Not Asked  Caffeine Concern Not Asked  Occupational Exposure Not Asked Charline Fluke Hazards Not Asked  Sleep Concern Not Asked  Stress Concern Not Asked  Weight Concern Not Asked  Special Diet Not Asked  Back Care Not Asked  Exercise Not Asked  Bike Helmet Not Asked  Seat Belt Not Asked  Self-Exams Not Asked Social History Narrative  Not on file Current Facility-Administered Medications Medication Dose Route Frequency Provider Last Rate Last Dose  diphenoxylate-atropine (LOMOTIL) tablet 1 Tab  1 Tab Oral QID PRN Washington Kang MD   1 Tab at 12/06/19 1434  
 magnesium oxide (MAG-OX) tablet 400 mg  400 mg Oral BID Ruchi Manning NP   400 mg at 12/08/19 0740  dronabinol (MARINOL) capsule 2.5 mg  2.5 mg Oral ACB&D Ruchi Manning NP   2.5 mg at 12/08/19 0739  
 0.9% sodium chloride infusion 250 mL  250 mL IntraVENous PRN Tenzin Yap MD      
 0.9% sodium chloride infusion 250 mL  250 mL IntraVENous PRN Tenzin Yap MD      
 acetaminophen/diphenhydrAMINE (TYLENOL PM EXT STR) 500/25 mg (Patient Supplied)   Oral QHS PRN Carla Talavera NP      
 temazepam (RESTORIL) capsule 15 mg  15 mg Oral QHS Edmund Us NP 15 mg at 12/07/19 2204  camphor-menthol (SARNA) 0.5-0.5 % lotion   Topical TID Rsoe Killian NP      
 piperacillin-tazobactam (ZOSYN) 3.375 g in 0.9% sodium chloride (MBP/ADV) 100 mL  3.375 g IntraVENous Q8H Elio Boo MD 25 mL/hr at 12/08/19 0608 3.375 g at 12/08/19 6731  baclofen (LIORESAL) tablet 5 mg  5 mg Oral Q8H PRN Nevaeh Dooley MD   5 mg at 11/21/19 1335  potassium chloride (K-DUR, KLOR-CON) SR tablet 20 mEq  20 mEq Oral BID Nevaeh Dooley MD   20 mEq at 12/08/19 0740  
 albuterol (PROVENTIL VENTOLIN) nebulizer solution 2.5 mg  2.5 mg Nebulization Q6H PRN Dave Pryor NP   2.5 mg at 11/21/19 1600  
 alum-mag hydroxide-simeth (MYLANTA) oral suspension 30 mL  30 mL Oral Q4H PRN Nevaeh Dooley MD   30 mL at 11/30/19 1043  loperamide (IMODIUM) capsule 2 mg  2 mg Oral Q4H PRN Nevaeh Dooley MD   2 mg at 12/06/19 2212  loratadine (CLARITIN) tablet 10 mg  10 mg Oral DAILY Susan Askew NP   10 mg at 12/08/19 0739  
 central line flush (saline) syringe 10 mL  10 mL InterCATHeter PRN Nevaeh Dooley MD   10 mL at 11/28/19 2109  
 docusate sodium (COLACE) capsule 100 mg  100 mg Oral BID PRN Harini Nair NP   100 mg at 11/11/19 1308  LORazepam (ATIVAN) injection 0.5 mg  0.5 mg IntraVENous Q6H PRN Nevaeh Dooley MD   0.5 mg at 11/27/19 1201  
 saline peripheral flush soln 10 mL  10 mL InterCATHeter PRN Nevaeh Dooley MD   10 mL at 11/23/19 2229  
 heparin (porcine) pf 300-500 Units  300-500 Units InterCATHeter PRN Nevaeh oDoley MD   300 Units at 12/01/19 0245  tbo-filgrastim (GRANIX) injection 480 mcg  480 mcg SubCUTAneous QHS Nevaeh Dooley MD   480 mcg at 12/07/19 2035  acyclovir (ZOVIRAX) tablet 400 mg  400 mg Oral BID Harini Nair NP   400 mg at 12/08/19 0740  allopurinol (ZYLOPRIM) tablet 300 mg  300 mg Oral DAILY Harini Nair NP   300 mg at 12/08/19 0740  cholecalciferol (VITAMIN D3) (400 Units /10 mcg) tablet 2 Tab  800 Units Oral DAILY Ruchi Manning, NP   2 Tab at 19 1001  DULoxetine (CYMBALTA) capsule 30 mg  30 mg Oral DAILY Ruchi Manning, NP   30 mg at 19 0740  lidocaine-prilocaine (EMLA) 2.5-2.5 % cream   Topical PRN Ruchi Manning, NP      
 LORazepam (ATIVAN) tablet 1 mg  1 mg Oral QHS Ruchi Chuye, NP   1 mg at 19 2204  pravastatin (PRAVACHOL) tablet 10 mg  10 mg Oral QHS Ruchi Chuye, NP   10 mg at 19  voriconazole (VFEND) tablet 200 mg  200 mg Oral Q12H Ruchi Manning, NP   200 mg at 19 0740  ondansetron (ZOFRAN) injection 4 mg  4 mg IntraVENous Q4H PRN Ruchi Manning, NP   4 mg at 19  prochlorperazine (COMPAZINE) with saline injection 5 mg  5 mg IntraVENous Q6H PRN Ruchi Manning NP   5 mg at 19 1107  
 HYDROcodone-acetaminophen (NORCO) 5-325 mg per tablet 1 Tab  1 Tab Oral Q6H PRN Ruchi Manning NP   1 Tab at 19 6906  morphine injection 2 mg  2 mg IntraVENous Q4H PRN Ruchi Manning NP      
 acetaminophen (TYLENOL) tablet 650 mg  650 mg Oral Q6H PRN Tenzin aYp MD   650 mg at 19 1081  diphenhydrAMINE (BENADRYL) capsule 25 mg  25 mg Oral Q6H PRN Tenzin Yap MD   25 mg at 19 1356  vit C,B-Om-incsk-lutein-zeaxan (PRESERVISION AREDS-2) capsule 1 Cap (Patient Supplied)  1 Cap Oral DAILY Tenzin Yap MD   1 Cap at 19 9860 OBJECTIVE: 
Patient Vitals for the past 8 hrs: 
 BP Temp Pulse Resp SpO2  
19 0708 140/54 99 °F (37.2 °C) 84 16 91 % 19 0420 126/63 99 °F (37.2 °C) 88 16 93 % Temp (24hrs), Av.1 °F (37.3 °C), Min:98.6 °F (37 °C), Max:99.6 °F (37.6 °C) 701 - 1900 In: 26 [P.O.:237] Out: - Physical Exam: 
Constitutional: Well developed, frail appearing female in no acute distress, sitting comfortably in bed HEENT: Normocephalic and atraumatic. Oropharynx is clear, mucous membranes are moist. Extraocular muscles are intact. Sclerae anicteric. Skin Warm and dry. Scattered rash. No erythema. No pallor. Bruising noted on BUE/R elbow. Respiratory Lungs CTAB,  normal air exchange without accessory muscle use. On O2 via NC.  
CVS Normal rate, regular rhythm and normal S1 and S2. No murmurs, gallops, or rubs. Abdomen Soft, nontender, nondistended, normoactive bowel sounds. Neuro Grossly nonfocal with no obvious sensory or motor deficits. MSK Normal range of motion in general.  No edema and no tenderness. Psych Appropriate mood and affect. Labs:   
Recent Results (from the past 24 hour(s)) METABOLIC PANEL, COMPREHENSIVE Collection Time: 12/08/19  4:25 AM  
Result Value Ref Range Sodium 143 136 - 145 mmol/L Potassium 4.0 3.5 - 5.1 mmol/L Chloride 109 (H) 98 - 107 mmol/L  
 CO2 29 21 - 32 mmol/L Anion gap 5 (L) 7 - 16 mmol/L Glucose 89 65 - 100 mg/dL BUN 12 8 - 23 MG/DL Creatinine 0.61 0.6 - 1.0 MG/DL  
 GFR est AA >60 >60 ml/min/1.73m2 GFR est non-AA >60 >60 ml/min/1.73m2 Calcium 8.4 8.3 - 10.4 MG/DL Bilirubin, total 0.3 0.2 - 1.1 MG/DL  
 ALT (SGPT) 24 12 - 65 U/L  
 AST (SGOT) 15 15 - 37 U/L Alk. phosphatase 78 50 - 136 U/L Protein, total 5.7 (L) 6.3 - 8.2 g/dL Albumin 2.1 (L) 3.2 - 4.6 g/dL Globulin 3.6 (H) 2.3 - 3.5 g/dL A-G Ratio 0.6 (L) 1.2 - 3.5 MAGNESIUM Collection Time: 12/08/19  4:25 AM  
Result Value Ref Range Magnesium 2.1 1.8 - 2.4 mg/dL LD Collection Time: 12/08/19  4:25 AM  
Result Value Ref Range  110 - 210 U/L  
PHOSPHORUS Collection Time: 12/08/19  4:25 AM  
Result Value Ref Range Phosphorus 3.1 2.3 - 3.7 MG/DL  
CBC WITH AUTOMATED DIFF Collection Time: 12/08/19  4:25 AM  
Result Value Ref Range WBC 0.3 (LL) 4.3 - 11.1 K/uL  
 RBC 2.43 (L) 4.05 - 5.2 M/uL HGB 7.4 (L) 11.7 - 15.4 g/dL HCT 22.0 (L) 35.8 - 46.3 % MCV 90.5 79.6 - 97.8 FL  
 MCH 30.5 26.1 - 32.9 PG  
 MCHC 33.6 31.4 - 35.0 g/dL  
 RDW 12.8 11.9 - 14.6 % PLATELET 7 (LL) 547 - 450 K/uL MPV 12.9 (H) 9.4 - 12.3 FL ABSOLUTE NRBC 0.00 0.0 - 0.2 K/uL  
 RBC COMMENTS NORMOCYTIC/NORMOCHROMIC    
 WBC COMMENTS WBC TOO FEW TO DIFFERENTIATE PLATELET COMMENTS MARKED    
 DF MANUAL Imaging: 
CT HEAD WO CONT [[id number]] Collected: 11/19/19 1050 Order Status: Completed Updated: 11/19/19 1054 Narrative:    
CT HEAD WITHOUT CONTRAST, 11/19/2019 History: Fall last night hitting left side of head Comparison: . Technique:   5 mm axial scans from the skull base to the vertex. All CT scans 
performed at this facility use one or all of the following: Automated exposure 
control, adjustment of the mA and/or kVp according to patient's size, iterative 
reconstruction. Findings:  No evidence of intracranial hemorrhage is seen. Faint bilateral basal 
ganglia calcifications are seen. No abnormal extra-axial fluid collections are 
seen.  Mild cortical involutional changes are seen which are not felt to be 
abnormal given the patient's age. Lethia Market evidence for acute hydrocephalus is seen. No evidence of midline shift or herniation is seen.  No abnormal edema pattern 
is seen in a vascular distribution to suggest large artery infarction. Evaluation with bone windows shows no acute osseous changes.  The visualized 
sinuses, middle ears, and mastoid air cells are well aerated. Impression:    
IMPRESSION:   
1.  No acute intracranial process evident by noncontrast CT study of the head. XR CHEST SNGL V [[id number]] Collected: 11/18/19 2041 Order Status: Completed Updated: 11/18/19 2044 Narrative:    
EXAM: XR CHEST SNGL V 
 
INDICATION: neutropenic fever COMPARISON: 9/29/2019 FINDINGS: A portable AP radiograph of the chest was obtained at 1946 hours. The 
patient is on a cardiac monitor. The lungs are clear. The cardiac and 
mediastinal contours and pulmonary vascularity are normal.  The bones and soft tissues are grossly within normal limits. Impression:    
IMPRESSION: Normal chest.  
XR ELBOW RT MIN 3 V [[id number]] Collected: 11/10/19 1421 Order Status: Completed Updated: 11/10/19 1424 Narrative:    
Right elbow Clinical location: Elbow pain after feeling a pop, decreased range of motion FINDINGS: Four views of the right elbow show no fracture or dislocation. There 
is no joint effusion. The soft tissues are unremarkable. Impression:    
IMPRESSION: No acute osseous abnormality or joint derangement of the right 
elbow. ASSESSMENT: 
Problem List  Date Reviewed: 10/31/2019 Codes Class Noted Febrile neutropenia (HCC) ICD-10-CM: D70.9, R50.81 ICD-9-CM: 288.00, 780.61  9/21/2019 Pancytopenia due to antineoplastic chemotherapy [facility]) ICD-10-CM: D61.810, T45.1X5A 
ICD-9-CM: 284.11, E933.1  6/12/2019 Cellulitis of neck ICD-10-CM: L17.011 ICD-9-CM: 682.1  6/12/2019 Immunocompromised status associated with infection ICD-10-CM: B99.9 ICD-9-CM: 136.9  6/12/2019 Port or reservoir infection ICD-10-CM: N77.928S ICD-9-CM: 999.33  6/12/2019 Acute myeloid leukemia not having achieved remission ([facility] ICD-10-CM: C92.00 ICD-9-CM: 205.00  5/9/2019 Admission for antineoplastic chemotherapy ICD-10-CM: Z51.11 ICD-9-CM: V58.11  5/5/2019 AML (acute myeloblastic [facility]) ([facility] ICD-10-CM: C92.00 ICD-9-CM: 205.00  4/28/2019 Weakness generalized ICD-10-CM: R53.1 ICD-9-CM: 780.79  4/28/2019 Pancytopenia ([facility] ICD-10-CM: O14.802 ICD-9-CM: 284.19  4/28/2019 Thrombocytopenia ([facility] ICD-10-CM: D69.6 ICD-9-CM: 287.5  4/27/2019 Ms. Evelyne Patel is a 68 y.o. female admitted on 11/7/2019. She is a known patient of Dr. 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 She failed induction with 7+3/Midostaurin. On Dacogen/Gilteritinib with recent BMbx with persistent residual disease.    She is being admitted for FLAG-VENITA. She is feeling well and is ready to proceed with treatment. PLAN: 
AML 
- admit for salvage FLAG-VENITA 
- needs Echo prior - ordered 11/8 Day 1 FLAG-VENITA. Echo with EF 55-60%, proceed with tx. 
11/9 Day 2 FLAG-VENITA. Tolerating well, no issues. Uric acid 3.1 today. 11/10 Day 3 FLAG-VENITA. No issues with treatment. Uric acid 3.3 today. 11/12 Day 5 FLAG-VENITA. Tolerating treatment well. G-CSF to start tomorrow. 11/13 Day 6 FLAG-VENITA, doing well, Granix/claritin starts today 12/4 Day 27 FLAG VENITA. Awaiting count recovery, con't Granix. WBC up to 100 today. 12/7 Day 31. WBC up to 300. Con't Granix. Hold on BMbx pending count recovery. Pancytopenia secondary to disease - Transfuse prn per Alexander SOPs R elbow pain 11/11 c/o R elbow pain with limited ROM yesterday. Xray neg. Pain improved today, noted bruising. Normal ROM on exam. 
 
Mild Abd discomfort/loose stools 11/13 discomfort is improved from yesterday, will monitor 11/14 loose stools, but not watery, can use Imodium prn 
11/15 Imodium working well  
11/16 controlled 11/26 Ova and parasite 11/22 pending. Check for C diff if stool appropriate. Continue anti diarrheal for now Neutropenic fever / Sepsis not present on admission / UTI / Strep/Enterococcus bacteremia 11/19 Tmax 102. 1.  UA +UTI. UCx pending. BCx-NGTD. CXR neg. On Cef/Vanc. DC prophylactic LVQ. 11/20 Tmax 102. BCx positive for streptococcus/enterococcus. Subculture in progress. On Cef/Vanc.   
11/22 repeat BC NTD. Alpha strep on vanc. 11/24 Repeat BC NTD. No fever. 11/25 new skin rash. ?RT to vanc. Consult ID for recommendations. 11/26 ID dc'd cefe and vanc. Started zosyn. TTE ordered. 12/2 Tmax 101. CXR neg.  UA neg. Repeat cultures. On Zosyn, restarted Vanc last PM. 
12/3 Tmax 102. Cultures NGTD. Con't Zosyn/Vanc. 12/4 Tmax 101.8.  ?r/t marrow recovery? UCx-NG. BCx-NGTD.   Con't Vanc/Zosyn. ID following - checking stool for giardia/cryptospordium and repeating CMV. 12/5 Tmax 101.9. BCx-NGTD. ID DC'd Vanc yesterday. 12/8 Afebrile. BCx-NGTD. Stool studies and CMV neg. Fungitell <31. Con't Zosyn. ID following. Fall 
11/19 s/p last PM.  No LOC. Hit head. CT head neg. Double vision 11/21 Neuro has seen patient. Concerns for possible 6th nerve palsy. Recommends LP. We will hold with WBC 0.0 and continue to monitor. 11/22 vision improved today. MRI brain pending. 11/23 MRI unremarkable. Discussed with Neuro. No need for LP 
11/27 Resolved Increased O2 needs / Hypoxia 12/5 O2 up to 4L via NC. Check CXR, BNP. Wt up 2# with +1.3L. Lasix 40mg IV x 1 ordered. 12/6 . CXR neg. Diuresed well. O2 down to 2L. Still with crackles, will repeat Lasix today. 12/7 Resolved. On RA Continue home meds Prophylactic Antibx: Acyclovir, Voriconazole Mily SOPs SCDs for DVT prophylaxis (AC contraindicated d/t thrombocytopenia) Goals and plan of care reviewed with the patient. All questions answered to the best of our ability. Disposition: Day 31 FLAG-VENITA. WBC up to 300. Awaiting count recovery, con't Granix. Hold off on repeat BMbx for now pending count recovery. Transfusions per mily SOPs. Upon discharge will need twice weekly labs w transfusions as needed. Weekly provider visits. RTC within 1 week from discharge. Ismael Hoffman NP [facility] Hematology & Oncology [address] [address] Office : [phone] Fax : [phone]

## 2019-12-08 NOTE — PROGRESS NOTES
END OF SHIFT NOTE: 
 
Intake/Output No intake/output data recorded. Voiding: YES Catheter: NO 
Drain:   
 
 
 
 
 
Stool:  1 occurrences. Stool Assessment Stool Color: Julio Old (12/05/19 1433) Stool Appearance: Loose;Soft(per patient) (12/07/19 0720) Stool Amount: Small (12/05/19 1433) Stool Source/Status: Rectum (12/05/19 0914) Emesis:  0 occurrences. VITAL SIGNS Patient Vitals for the past 12 hrs: 
 Temp Pulse Resp BP SpO2  
12/07/19 1528     93 % 12/07/19 1526 99 °F (37.2 °C) 94 16 139/56 90 % 12/07/19 1102 98.6 °F (37 °C) 93 18 131/59 94 % 12/07/19 0720 97.7 °F (36.5 °C) (!) 110 18 108/62 93 % Pain Assessment Pain 1 Pain Scale 1: Numeric (0 - 10) (12/07/19 1526) Pain Intensity 1: 0 (12/07/19 1526) Patient Stated Pain Goal: 0 (12/07/19 1526) Pain Reassessment 1: Patient resting w/respiratory rate greater than 10 (12/01/19 0340) Pain Onset 1: Upon awakening (12/01/19 0242) Pain Location 1: Head (12/01/19 0242) Pain Orientation 1: Lower (12/01/19 0242) Pain Description 1: Aching (12/01/19 0242) Pain Intervention(s) 1: Medication (see MAR) (12/01/19 0242) Ambulating Yes Additional Information: awaiting count recovery Shift report given to oncoming nurse at the bedside.  
 
Awilda Branch, RN

## 2019-12-08 NOTE — PROGRESS NOTES
End of Shift: 
 
Patient rested well overnight, no c/o pain or nausea. Still fatigued, WBC up to 0.3, remains on Granix. Will receive 1U plts today. Report given to oncoming RN at bedside

## 2019-12-09 LAB
ALBUMIN SERPL-MCNC: 2.1 G/DL (ref 3.2–4.6)
ALBUMIN/GLOB SERPL: 0.6 {RATIO} (ref 1.2–3.5)
ALP SERPL-CCNC: 82 U/L (ref 50–136)
ALT SERPL-CCNC: 27 U/L (ref 12–65)
ANION GAP SERPL CALC-SCNC: 4 MMOL/L (ref 7–16)
AST SERPL-CCNC: 16 U/L (ref 15–37)
BASOPHILS # BLD: 0 K/UL (ref 0–0.2)
BASOPHILS NFR BLD: 0 % (ref 0–2)
BILIRUB SERPL-MCNC: 0.3 MG/DL (ref 0.2–1.1)
BLD PROD TYP BPU: NORMAL
BLOOD BANK CMNT PATIENT-IMP: NORMAL
BPU ID: NORMAL
BUN SERPL-MCNC: 12 MG/DL (ref 8–23)
CALCIUM SERPL-MCNC: 8.4 MG/DL (ref 8.3–10.4)
CHLORIDE SERPL-SCNC: 109 MMOL/L (ref 98–107)
CO2 SERPL-SCNC: 29 MMOL/L (ref 21–32)
CREAT SERPL-MCNC: 0.62 MG/DL (ref 0.6–1)
DIFFERENTIAL METHOD BLD: ABNORMAL
EOSINOPHIL # BLD: 0 K/UL (ref 0–0.8)
EOSINOPHIL NFR BLD: 0 % (ref 0.5–7.8)
ERYTHROCYTE [DISTWIDTH] IN BLOOD BY AUTOMATED COUNT: 12.7 % (ref 11.9–14.6)
GLOBULIN SER CALC-MCNC: 3.7 G/DL (ref 2.3–3.5)
GLUCOSE SERPL-MCNC: 83 MG/DL (ref 65–100)
HCT VFR BLD AUTO: 21.7 % (ref 35.8–46.3)
HGB BLD-MCNC: 7.2 G/DL (ref 11.7–15.4)
IMM GRANULOCYTES # BLD AUTO: 0.1 K/UL (ref 0–0.5)
IMM GRANULOCYTES NFR BLD AUTO: 14 % (ref 0–5)
LDH SERPL L TO P-CCNC: 180 U/L (ref 110–210)
LYMPHOCYTES # BLD: 0 K/UL (ref 0.5–4.6)
LYMPHOCYTES NFR BLD: 5 % (ref 13–44)
MAGNESIUM SERPL-MCNC: 2.1 MG/DL (ref 1.8–2.4)
MCH RBC QN AUTO: 29.6 PG (ref 26.1–32.9)
MCHC RBC AUTO-ENTMCNC: 33.2 G/DL (ref 31.4–35)
MCV RBC AUTO: 89.3 FL (ref 79.6–97.8)
MONOCYTES # BLD: 0.1 K/UL (ref 0.1–1.3)
MONOCYTES NFR BLD: 9 % (ref 4–12)
NEUTS SEG # BLD: 0.5 K/UL (ref 1.7–8.2)
NEUTS SEG NFR BLD: 72 % (ref 43–78)
NRBC # BLD: 0 K/UL (ref 0–0.2)
PHOSPHATE SERPL-MCNC: 3.1 MG/DL (ref 2.3–3.7)
PLATELET # BLD AUTO: 37 K/UL (ref 150–450)
PLATELET COMMENTS,PCOM: ABNORMAL
PMV BLD AUTO: 10.2 FL (ref 9.4–12.3)
POTASSIUM SERPL-SCNC: 4 MMOL/L (ref 3.5–5.1)
PROT SERPL-MCNC: 5.8 G/DL (ref 6.3–8.2)
RBC # BLD AUTO: 2.43 M/UL (ref 4.05–5.2)
RBC MORPH BLD: ABNORMAL
SODIUM SERPL-SCNC: 142 MMOL/L (ref 136–145)
STATUS OF UNIT,%ST: NORMAL
UNIT DIVISION, %UDIV: 0
WBC # BLD AUTO: 0.7 K/UL (ref 4.3–11.1)
WBC MORPH BLD: ABNORMAL

## 2019-12-09 PROCEDURE — 74011000258 HC RX REV CODE- 258: Performed by: INTERNAL MEDICINE

## 2019-12-09 PROCEDURE — 77030020256 HC SOL INJ NACL 0.9%  500ML

## 2019-12-09 PROCEDURE — 74011250637 HC RX REV CODE- 250/637: Performed by: NURSE PRACTITIONER

## 2019-12-09 PROCEDURE — 99232 SBSQ HOSP IP/OBS MODERATE 35: CPT | Performed by: INTERNAL MEDICINE

## 2019-12-09 PROCEDURE — 83615 LACTATE (LD) (LDH) ENZYME: CPT

## 2019-12-09 PROCEDURE — 74011250636 HC RX REV CODE- 250/636: Performed by: INTERNAL MEDICINE

## 2019-12-09 PROCEDURE — 83735 ASSAY OF MAGNESIUM: CPT

## 2019-12-09 PROCEDURE — 80053 COMPREHEN METABOLIC PANEL: CPT

## 2019-12-09 PROCEDURE — 65270000015 HC RM PRIVATE ONCOLOGY

## 2019-12-09 PROCEDURE — 74011250636 HC RX REV CODE- 250/636: Performed by: NURSE PRACTITIONER

## 2019-12-09 PROCEDURE — 74011250637 HC RX REV CODE- 250/637: Performed by: INTERNAL MEDICINE

## 2019-12-09 PROCEDURE — 85025 COMPLETE CBC W/AUTO DIFF WBC: CPT

## 2019-12-09 PROCEDURE — 84100 ASSAY OF PHOSPHORUS: CPT

## 2019-12-09 PROCEDURE — 65270000029 HC RM PRIVATE

## 2019-12-09 PROCEDURE — 36591 DRAW BLOOD OFF VENOUS DEVICE: CPT

## 2019-12-09 RX ADMIN — LORATADINE 10 MG: 10 TABLET ORAL at 08:43

## 2019-12-09 RX ADMIN — TEMAZEPAM 15 MG: 15 CAPSULE ORAL at 21:27

## 2019-12-09 RX ADMIN — CAMPHOR AND MENTHOL 1 ML: 5; 5 LOTION TOPICAL at 09:00

## 2019-12-09 RX ADMIN — ACYCLOVIR 400 MG: 800 TABLET ORAL at 08:42

## 2019-12-09 RX ADMIN — SODIUM CHLORIDE 250 ML: 900 INJECTION, SOLUTION INTRAVENOUS at 06:42

## 2019-12-09 RX ADMIN — DULOXETINE 30 MG: 30 CAPSULE, DELAYED RELEASE ORAL at 08:42

## 2019-12-09 RX ADMIN — ALLOPURINOL 300 MG: 300 TABLET ORAL at 08:42

## 2019-12-09 RX ADMIN — TBO-FILGRASTIM 480 MCG: 480 INJECTION, SOLUTION SUBCUTANEOUS at 21:26

## 2019-12-09 RX ADMIN — ONDANSETRON 4 MG: 2 INJECTION INTRAMUSCULAR; INTRAVENOUS at 07:22

## 2019-12-09 RX ADMIN — Medication 2 TABLET: at 08:42

## 2019-12-09 RX ADMIN — DRONABINOL 2.5 MG: 2.5 CAPSULE ORAL at 06:41

## 2019-12-09 RX ADMIN — VORICONAZOLE 200 MG: 200 TABLET, FILM COATED ORAL at 21:26

## 2019-12-09 RX ADMIN — Medication 400 MG: at 08:43

## 2019-12-09 RX ADMIN — Medication 400 MG: at 17:20

## 2019-12-09 RX ADMIN — ONDANSETRON 4 MG: 2 INJECTION INTRAMUSCULAR; INTRAVENOUS at 17:24

## 2019-12-09 RX ADMIN — VORICONAZOLE 200 MG: 200 TABLET, FILM COATED ORAL at 08:43

## 2019-12-09 RX ADMIN — POTASSIUM CHLORIDE 20 MEQ: 20 TABLET, EXTENDED RELEASE ORAL at 17:20

## 2019-12-09 RX ADMIN — DRONABINOL 2.5 MG: 2.5 CAPSULE ORAL at 15:52

## 2019-12-09 RX ADMIN — POTASSIUM CHLORIDE 20 MEQ: 20 TABLET, EXTENDED RELEASE ORAL at 08:48

## 2019-12-09 RX ADMIN — LORAZEPAM 1 MG: 1 TABLET ORAL at 21:27

## 2019-12-09 RX ADMIN — CAMPHOR AND MENTHOL: 5; 5 LOTION TOPICAL at 21:33

## 2019-12-09 RX ADMIN — CAMPHOR AND MENTHOL 1 ML: 5; 5 LOTION TOPICAL at 16:00

## 2019-12-09 RX ADMIN — ACYCLOVIR 400 MG: 800 TABLET ORAL at 17:20

## 2019-12-09 RX ADMIN — PRAVASTATIN SODIUM 10 MG: 20 TABLET ORAL at 21:26

## 2019-12-09 RX ADMIN — PIPERACILLIN AND TAZOBACTAM 3.38 G: 3; .375 INJECTION, POWDER, FOR SOLUTION INTRAVENOUS at 14:39

## 2019-12-09 RX ADMIN — PIPERACILLIN AND TAZOBACTAM 3.38 G: 3; .375 INJECTION, POWDER, FOR SOLUTION INTRAVENOUS at 21:27

## 2019-12-09 RX ADMIN — PIPERACILLIN AND TAZOBACTAM 3.38 G: 3; .375 INJECTION, POWDER, FOR SOLUTION INTRAVENOUS at 06:42

## 2019-12-09 NOTE — PROGRESS NOTES
SBAR received from [address]. Patient resting comfortably in chair, no signs of distress. Chair low and wheels locked. Call bell within reach.

## 2019-12-09 NOTE — PROGRESS NOTES
Infectious Disease Progress Note Today's Date: 2019 Admit Date: 2019 Impression: · E faecalis/alpha strep bacteremia (); repeat blood cx () NG ; TTE (-); source Port vs GI. Completed tx 12/3, PORT retained. · AML; admitted for salvage FLAG-VENITA · Febrile neutropenia-afebrile since  · Pancytopenia · Chronic diarrhea-stool and CMV NR. Plan:  
· BMBx this week. Repeat BCs ,  NG. · Will discuss with Dr Nicolasa Patrick about de-escalating abx coverage. For now, continue Zosyn. Anti-infectives: · Alicia He · ACV 
· IV Vancomycin (-) · IV Cefepime (-) · PO LVQ (-) · IV Zosyn (- Subjective: Interval History: Afebrile, . Appears well, states diarrhea frequently improved but consistency still very loose.  in room No Known Allergies Review of Systems:  A comprehensive review of systems was negative except for that written in the History of Present Illness. Objective:  
 
Visit Vitals /61 (BP 1 Location: Left arm, BP Patient Position: At rest) Pulse 94 Temp 98.6 °F (37 °C) Resp 18 Ht 5' 6\" (1.676 m) Wt 82.4 kg (181 lb 9.6 oz) SpO2 93% BMI 29.31 kg/m² Temp (24hrs), Av.7 °F (37.1 °C), Min:98.2 °F (36.8 °C), Max:99.1 °F (37.3 °C) Lines:  L chest dual PORT Physical Exam:   
General:  Alert, cooperative, appears chronically ill Eyes:  Sclera anicteric. Pupils equally round and reactive to light. Mouth/Throat: Mucous membranes normal, oral pharynx clear Neck: Supple Lungs:   Clear to auscultation bilaterally, good effort, 3L  
CV:  Regular rate and rhythm,no murmur, click, rub or gallop Abdomen:   Soft, non-tender. bowel sounds normal. non-distended Extremities: No cyanosis. Trace edema Skin: Skin color, texture, turgor normal. no acute rash or lesions Lymph nodes: Cervical and supraclavicular normal  
Musculoskeletal: No swelling or deformity Lines/Devices:  Intact, no erythema, drainage or tenderness Psych: Alert and oriented, normal mood affect given the setting Data Review: CBC: 
Recent Labs 12/09/19 
7538 12/08/19 
0425 12/07/19 
4352 WBC 0.7* 0.3* 0.2* GRANS 72  --   -- MONOS 9  --   --   
EOS 0*  --   --   
ANEU 0.5*  --   --   
ABL 0.0*  --   --   
HGB 7.2* 7.4* 7.6* HCT 21.7* 22.0* 22.6*  
PLT 37* 7* 12* BMP: 
Recent Labs 12/09/19 
8870 12/08/19 
0425 12/07/19 
3038 CREA 0.62 0.61 0.68  
BUN 12 12 14  143 142  
K 4.0 4.0 3.9 * 109* 107 CO2 29 29 29 AGAP 4* 5* 6*  
GLU 83 89 97 LFTS: 
Recent Labs 12/09/19 
8795 12/08/19 
0425 12/07/19 
3808 TBILI 0.3 0.3 0.3 ALT 27 24 23 SGOT 16 15 16 AP 82 78 79  
TP 5.8* 5.7* 6.0* ALB 2.1* 2.1* 2.1* Microbiology:  
 
All Micro Results Procedure Component Value Units Date/Time CMV BY PCR, QT [[id number]] Collected:  12/04/19 0401 Order Status:  Completed Specimen:  Blood from Plasma Updated:  12/06/19 2236 CMV IU/mL NEGATIVE  IU/mL Comment: (NOTE) No CMV DNA detected. The quantitative range of this assay is 200 to 1 million IU/mL. This test was developed and its performance characteristics 
determined by [employer]. It has not been cleared or approved by the 
Food and Drug Administration. The FDA has determined that such 
clearance or approval is not necessary. Performed At: [address] [id number] Kamlesh Collins MD JT:[id number] CMV log 10 IU/mL TEST NOT PERFORMED log10 IU/mL Comment: (NOTE) Unable to calculate result since non-numeric result obtained for 
component test. 
  
  
 CRYPTOSPORIDIUM, DIRECT DETECTION EIA [[id number]] Collected:  12/03/19 2303 Order Status:  Completed Specimen:  Stool Updated:  12/06/19 9597 Source STOOL Cryptosporidium NEGATIVE Comment: (NOTE) Performed At: [facility] [address] [place], [place] [id number] Kt Canela MD [id number] CULTURE, BLOOD [[id number]] Collected:  12/01/19 2003 Order Status:  Completed Specimen:  Blood Updated:  12/06/19 1912 Special Requests: MEDIAL PORT Culture result: NO GROWTH 5 DAYS     
 CULTURE, BLOOD [[id number]] Collected:  12/01/19 2043 Order Status:  Completed Specimen:  Blood Updated:  12/06/19 6120 Special Requests: --     
  RIGHT Antecubital 
  
  Culture result: NO GROWTH 5 DAYS C. DIFFICILE AG & TOXIN A/B [[id number]] Collected:  12/03/19 2303 Order Status:  Completed Specimen:  Stool Updated:  12/04/19 1311 [id number] ANTIGEN    
  C. DIFFICILE GDH ANTIGEN-NEGATIVE  
     
  C. difficile toxin C. DIFFICILE TOXIN-NEGATIVE  
     
  PCR Reflex NOT APPLICABLE INTERPRETATION    
  NEGATIVE FOR TOXIGENIC C. DIFFICILE Clinical Consideration NEGATIVE FOR TOXIGENIC C. DIFFICILE  
     
 CULTURE, URINE [[id number]] Collected:  12/01/19 2058 Order Status:  Completed Specimen:  Urine from Clean catch Updated:  12/04/19 2128 Special Requests: NO SPECIAL REQUESTS Culture result: NO GROWTH 2 DAYS     
 OVA & PARASITES, STOOL [[id number]] Collected:  11/22/19 9678 Order Status:  Completed Specimen:  Stool Updated:  11/27/19 1236 Source STOOL Ova & Parasite exam Final Report Below Comment: (NOTE) These results were obtained using wet preparation(s) and trichrome 
stained smear. This test does not include testing for Cryptosporidium parvum, Cyclospora, or Microsporidia. Performed At: [address], [address] [place] [address] Gypsy Patrick MD QT:[id number] C. DIFFICILE AG & TOXIN A/B [[id number]] Collected:  11/27/19 0525 Order Status:  Completed Specimen:  Stool Updated:  11/27/19 1233 [id number] ANTIGEN    
  C. DIFFICILE GDH ANTIGEN-NEGATIVE  
     
  C. difficile toxin   C. DIFFICILE TOXIN-NEGATIVE  
     
 PCR Reflex NOT APPLICABLE INTERPRETATION    
  NEGATIVE FOR TOXIGENIC C. DIFFICILE Clinical Consideration NEGATIVE FOR TOXIGENIC C. DIFFICILE  
     
 CULTURE, BLOOD [[id number]] Collected:  11/21/19 0106 Order Status:  Completed Specimen:  Blood Updated:  11/26/19 0233 Special Requests: --     
  RIGHT 
HAND Culture result: NO GROWTH 5 DAYS     
 CULTURE, BLOOD [[id number]] Collected:  11/20/19 2028 Order Status:  Completed Specimen:  Blood Updated:  11/25/19 9521 Special Requests: PORT Culture result: NO GROWTH 5 DAYS     
 CULTURE, STOOL [[id number]] Collected:  11/22/19 7116 Order Status:  Completed Specimen:  Stool Updated:  11/24/19 5814 Special Requests: NO SPECIAL REQUESTS Culture result:    
  No Salmonella, Shigella, or Ecoli 0157 isolated. NO GROWTH OF GRAM NEGATIVE FECAL REGGIE,  
     
 CULTURE, BLOOD [[id number]] Collected:  11/18/19 2032 Order Status:  Completed Specimen:  Blood Updated:  11/23/19 9898 Special Requests: --     
  RIGHT 
HAND Culture result: NO GROWTH 5 DAYS     
 CULTURE, BLOOD [[id number]]  (Abnormal)  (Susceptibility) Collected:  11/18/19 1955 Order Status:  Completed Specimen:  Blood Updated:  11/22/19 0730 Special Requests: LATERAL PORT     
  GRAM STAIN GRAM POS COCCI IN CHAINS AEROBIC AND ANAEROBIC BOTTLES RESULTS VERIFIED, PHONED TO AND READ BACK BY EJ CORREA RN @ 4517 ON 11/19/2019 BY AMM Culture result:    
  ENTEROCOCCUS FAECALIS GROUP D ALPHA STREPTOCOCCUS, NOT S. PNEUMONIAE THIS ORGANISM MAY BE INDICATIVE OF CULTURE CONTAMINATION, HOWEVER, CLINICAL CORRELATION NEEDS TO BE EVALUATED, AS EACH CASE IS UNIQUE. REFER TO Christopher ARNETT [id number] CULTURE, URINE [[id number]] Collected:  11/19/19 0102 Order Status:  Completed Specimen:  Urine from Clean catch Updated:  11/21/19 0710 Special Requests: NO SPECIAL REQUESTS Culture result: NO GROWTH 2 DAYS     
 BLOOD CULTURE ID PANEL [[id number]]  (Abnormal) Collected:  11/18/19 1955 Order Status:  Completed Specimen:  Blood Updated:  11/19/19 1420 Acc. no. from [employer] [id number] Enterococcus DETECTED Streptococcus DETECTED Comment: RESULTS VERIFIED, PHONED TO AND READ BACK BY 
EJ CORREA RN @ 5915 ON 11/19/2019 BY WILLOW Grewal A/B (Vancomycin Resistance Gene) NOT DETECTED Clinical Consideration Multiple organisms detected. Results do not replace susceptibility testing. Kane Guerrero [[id number]] Collected:  11/19/19 5647 Order Status:  Canceled Specimen:  Stool Imaging:  
Reviewed: CXR 12/5/19 No active disease CXR 12/1/19 IMPRESSION: No acute cardiopulmonary abnormality. No infiltrate appreciated. Signed By: Jorge Killian NP December 9, 2019

## 2019-12-09 NOTE — PROGRESS NOTES
[facility] Hematology & Oncology Inpatient Hematology / Oncology Daily Progress Note Reason for Admission:  Admission for antineoplastic chemotherapy [Z51.11] 24 Hour Events: 
Afebrile, VSS Day 32 FLAG-VENITA ANC up to 500 Awaiting count recovery, on Granix States she feels better today ROS: 
Constitutional: Negative for fever, fatigue. CV: Negative for chest pain, palpitations, edema. Respiratory: Negative for dyspnea, cough, wheezing. GI: Negative for nausea, abdominal pain, diarrhea 10 point review of systems is otherwise negative with the exception of the elements mentioned above in the HPI. No Known Allergies Past Medical History:  
Diagnosis Date  AML (acute myeloid leukemia) ([facility] [address] dx- 4/2019  
 followed by dr Laura Aguiar  Depression  Hypercholesterolemia  Infection   
 of port -- was placed 5/2019-- removed 6/2019---right chest  
 Psychiatric disorder   
 aniexty  Sleep apnea Past Surgical History:  
Procedure Laterality Date  HX OTHER SURGICAL    
 colonoscopy  HX VASCULAR ACCESS    
 IR INSERT TUNL CVC W PORT OVER 5 YEARS  4/30/2019  IR INSERT TUNL CVC W PORT OVER 5 YEARS  7/15/2019  IR REMOVE TUNL CVAD W PORT/PUMP  6/13/2019 Family History Problem Relation Age of Onset  Cancer Father Social History Socioeconomic History  Marital status:  Spouse name: Not on file  Number of children: Not on file  Years of education: Not on file  Highest education level: Not on file Occupational History  Not on file Social Needs  Financial resource strain: Not on file  Food insecurity:  
  Worry: Not on file Inability: Not on file  Transportation needs:  
  Medical: Not on file Non-medical: Not on file Tobacco Use  Smoking status: Never Smoker  Smokeless tobacco: Never Used Substance and Sexual Activity  Alcohol use: Never Frequency: Never  Drug use: Never  Sexual activity: Not on file Lifestyle  Physical activity:  
  Days per week: Not on file Minutes per session: Not on file  Stress: Not on file Relationships  Social connections:  
  Talks on phone: Not on file Gets together: Not on file Attends Latter-day service: Not on file Active member of club or organization: Not on file Attends meetings of clubs or organizations: Not on file Relationship status: Not on file  Intimate partner violence:  
  Fear of current or ex partner: Not on file Emotionally abused: Not on file Physically abused: Not on file Forced sexual activity: Not on file Other Topics Concern [address] Service Not Asked  Blood Transfusions Not Asked  Caffeine Concern Not Asked  Occupational Exposure Not Asked [employer] Hazards Not Asked  Sleep Concern Not Asked  Stress Concern Not Asked  Weight Concern Not Asked  Special Diet Not Asked  Back Care Not Asked  Exercise Not Asked  Bike Helmet Not Asked  Seat Belt Not Asked  Self-Exams Not Asked Social History Narrative  Not on file Current Facility-Administered Medications Medication Dose Route Frequency Provider Last Rate Last Dose  diphenoxylate-atropine (LOMOTIL) tablet 1 Tab  1 Tab Oral QID PRN Yun Pineda MD   1 Tab at 12/06/19 1434  
 magnesium oxide (MAG-OX) tablet 400 mg  400 mg Oral BID Rosa Elena Waldemar, NP   400 mg at 12/09/19 4417  dronabinol (MARINOL) capsule 2.5 mg  2.5 mg Oral ACB&D Rosa Elena Waldemar, NP   2.5 mg at 12/09/19 0641  
 0.9% sodium chloride infusion 250 mL  250 mL IntraVENous PRN Monika De Jesus MD 15 mL/hr at 12/09/19 0642 250 mL at 12/09/19 5259  
 0.9% sodium chloride infusion 250 mL  250 mL IntraVENous PRN Monika De Jesus MD      
 acetaminophen/diphenhydrAMINE (TYLENOL PM EXT STR) 500/25 mg (Patient Supplied)   Oral QHS PRN Karuna Larsen NP      
  temazepam (RESTORIL) capsule 15 mg  15 mg Oral QHS Aidan ARMANDO Partida   15 mg at 12/08/19 2128  camphor-menthol (SARNA) 0.5-0.5 % lotion   Topical TID Laura Dempsey NP   1 mL at 12/09/19 0900  piperacillin-tazobactam (ZOSYN) 3.375 g in 0.9% sodium chloride (MBP/ADV) 100 mL  3.375 g IntraVENous Q8H Crystal Pepe MD 25 mL/hr at 12/09/19 0642 3.375 g at 12/09/19 4857  baclofen (LIORESAL) tablet 5 mg  5 mg Oral Q8H PRN Rheba Givens, MD   5 mg at 11/21/19 1335  potassium chloride (K-DUR, KLOR-CON) SR tablet 20 mEq  20 mEq Oral BID Rheba Givens, MD   20 mEq at 12/09/19 8955  albuterol (PROVENTIL VENTOLIN) nebulizer solution 2.5 mg  2.5 mg Nebulization Q6H PRN Christine Jay NP   2.5 mg at 11/21/19 1600  
 alum-mag hydroxide-simeth (MYLANTA) oral suspension 30 mL  30 mL Oral Q4H PRN Rheba Givens, MD   30 mL at 11/30/19 6075  loperamide (IMODIUM) capsule 2 mg  2 mg Oral Q4H PRN Rheba Givens, MD   2 mg at 12/06/19 2212  loratadine (CLARITIN) tablet 10 mg  10 mg Oral DAILY Antonio Estes NP   10 mg at 12/09/19 3496  central line flush (saline) syringe 10 mL  10 mL InterCATHeter PRN Rheba Givens, MD   10 mL at 11/28/19 2109  
 docusate sodium (COLACE) capsule 100 mg  100 mg Oral BID PRN Leigh Hashimoto, NP   100 mg at 11/11/19 1308  LORazepam (ATIVAN) injection 0.5 mg  0.5 mg IntraVENous Q6H PRN Rheba Givens, MD   0.5 mg at 11/27/19 1201  
 saline peripheral flush soln 10 mL  10 mL InterCATHeter PRN Rheba Givens, MD   10 mL at 11/23/19 2229  
 heparin (porcine) pf 300-500 Units  300-500 Units InterCATHeter PRN Jeremi Hurtado MD   300 Units at 12/01/19 0245  tbo-filgrastim (GRANIX) injection 480 mcg  480 mcg SubCUTAneous QHS Rheba MD [facility]   480 mcg at 12/08/19 2129  
 acyclovir (ZOVIRAX) tablet 400 mg  400 mg Oral BID Leigh Hashimoto, NP   400 mg at 12/09/19 2811  allopurinol (ZYLOPRIM) tablet 300 mg  300 mg Oral DAILY Leigh Hashimoto, NP   300 mg at 12/09/19 9981  cholecalciferol (VITAMIN D3) (400 Units /10 mcg) tablet 2 Tab  800 Units Oral DAILY [facility], NP   2 Tab at 19 0488  DULoxetine (CYMBALTA) capsule 30 mg  30 mg Oral DAILY [facility], NP   30 mg at 19 1825  lidocaine-prilocaine (EMLA) 2.5-2.5 % cream   Topical PRN [facility], NP      
 LORazepam (ATIVAN) tablet 1 mg  1 mg Oral QHS [facility], NP   1 mg at 19 2128  pravastatin (PRAVACHOL) tablet 10 mg  10 mg Oral QHS [facility], NP   10 mg at 19 2128  voriconazole (VFEND) tablet 200 mg  200 mg Oral Q12H [facility], NP   200 mg at 19 3375  ondansetron (ZOFRAN) injection 4 mg  4 mg IntraVENous Q4H PRN [facility], NP   4 mg at 19 4314  prochlorperazine (COMPAZINE) with saline injection 5 mg  5 mg IntraVENous Q6H PRN [facility], NP   5 mg at 19 1107  
 HYDROcodone-acetaminophen (NORCO) 5-325 mg per tablet 1 Tab  1 Tab Oral Q6H PRN [facility], NP   1 Tab at 19 1568  morphine injection 2 mg  2 mg IntraVENous Q4H PRN [facility], NP      
 acetaminophen (TYLENOL) tablet 650 mg  650 mg Oral Q6H PRN Sasha Graf MD   650 mg at 19 1306  diphenhydrAMINE (BENADRYL) capsule 25 mg  25 mg Oral Q6H PRN Sasha Graf MD   25 mg at 19 1306  vit C,Z-Lf-nyyqi-lutein-zeaxan (PRESERVISION AREDS-2) capsule 1 Cap (Patient Supplied)  1 Cap Oral DAILY Sasha Graf MD   1 Cap at 19 0004 OBJECTIVE: 
Patient Vitals for the past 8 hrs: 
 BP Temp Pulse Resp SpO2  
19 0736 125/61 98.6 °F (37 °C) 94 18 93 % 19 0430 132/58 98.8 °F (37.1 °C) 84 18 94 % Temp (24hrs), Av.5 °F (36.9 °C), Min:97.9 °F (36.6 °C), Max:99.1 °F (37.3 °C) No intake/output data recorded. Physical Exam: 
Constitutional: Well developed, frail appearing female in no acute distress, sitting comfortably in the bedside chair. HEENT: Normocephalic and atraumatic.  Oropharynx is clear, mucous membranes are moist. Extraocular muscles are intact. Sclerae anicteric. Skin Warm and dry. Scattered rash. No erythema. No pallor. Bruising noted on BUE/R elbow. Respiratory Lungs CTAB,  normal air exchange without accessory muscle use. CVS Normal rate, regular rhythm and normal S1 and S2. No murmurs, gallops, or rubs. Abdomen Soft, nontender, nondistended, normoactive bowel sounds. Neuro Grossly nonfocal with no obvious sensory or motor deficits. MSK Normal range of motion in general.  No edema and no tenderness. Psych Appropriate mood and affect. Labs:   
Recent Results (from the past 24 hour(s)) METABOLIC PANEL, COMPREHENSIVE Collection Time: 12/09/19  4:34 AM  
Result Value Ref Range Sodium 142 136 - 145 mmol/L Potassium 4.0 3.5 - 5.1 mmol/L Chloride 109 (H) 98 - 107 mmol/L  
 CO2 29 21 - 32 mmol/L Anion gap 4 (L) 7 - 16 mmol/L Glucose 83 65 - 100 mg/dL BUN 12 8 - 23 MG/DL Creatinine 0.62 0.6 - 1.0 MG/DL  
 GFR est AA >60 >60 ml/min/1.73m2 GFR est non-AA >60 >60 ml/min/1.73m2 Calcium 8.4 8.3 - 10.4 MG/DL Bilirubin, total 0.3 0.2 - 1.1 MG/DL  
 ALT (SGPT) 27 12 - 65 U/L  
 AST (SGOT) 16 15 - 37 U/L Alk. phosphatase 82 50 - 136 U/L Protein, total 5.8 (L) 6.3 - 8.2 g/dL Albumin 2.1 (L) 3.2 - 4.6 g/dL Globulin 3.7 (H) 2.3 - 3.5 g/dL A-G Ratio 0.6 (L) 1.2 - 3.5 MAGNESIUM Collection Time: 12/09/19  4:34 AM  
Result Value Ref Range Magnesium 2.1 1.8 - 2.4 mg/dL LD Collection Time: 12/09/19  4:34 AM  
Result Value Ref Range  110 - 210 U/L  
PHOSPHORUS Collection Time: 12/09/19  4:34 AM  
Result Value Ref Range Phosphorus 3.1 2.3 - 3.7 MG/DL  
CBC WITH AUTOMATED DIFF Collection Time: 12/09/19  4:34 AM  
Result Value Ref Range WBC 0.7 (LL) 4.3 - 11.1 K/uL  
 RBC 2.43 (L) 4.05 - 5.2 M/uL HGB 7.2 (L) 11.7 - 15.4 g/dL HCT 21.7 (L) 35.8 - 46.3 %  MCV 89.3 79.6 - 97.8 FL  
 MCH 29.6 26.1 - 32.9 PG  
 MCHC 33.2 31.4 - 35.0 g/dL  
 RDW 12.7 11.9 - 14.6 % PLATELET 37 (L) 453 - 450 K/uL MPV 10.2 9.4 - 12.3 FL ABSOLUTE NRBC 0.00 0.0 - 0.2 K/uL NEUTROPHILS 72 43 - 78 % LYMPHOCYTES 5 (L) 13 - 44 % MONOCYTES 9 4.0 - 12.0 % EOSINOPHILS 0 (L) 0.5 - 7.8 % BASOPHILS 0 0.0 - 2.0 % IMMATURE GRANULOCYTES 14 (H) 0.0 - 5.0 %  
 ABS. NEUTROPHILS 0.5 (L) 1.7 - 8.2 K/UL  
 ABS. LYMPHOCYTES 0.0 (L) 0.5 - 4.6 K/UL  
 ABS. MONOCYTES 0.1 0.1 - 1.3 K/UL  
 ABS. EOSINOPHILS 0.0 0.0 - 0.8 K/UL  
 ABS. BASOPHILS 0.0 0.0 - 0.2 K/UL  
 ABS. IMM. GRANS. 0.1 0.0 - 0.5 K/UL  
 RBC COMMENTS NORMOCYTIC/NORMOCHROMIC    
 WBC COMMENTS Result Confirmed By Smear PLATELET COMMENTS MARKED    
 DF AUTOMATED Imaging: 
CT HEAD WO CONT [[id number]] Collected: 11/19/19 1050 Order Status: Completed Updated: 11/19/19 1054 Narrative:    
CT HEAD WITHOUT CONTRAST, 11/19/2019 History: Fall last night hitting left side of head Comparison: . Technique:   5 mm axial scans from the skull base to the vertex. All CT scans 
performed at this facility use one or all of the following: Automated exposure 
control, adjustment of the mA and/or kVp according to patient's size, iterative 
reconstruction. Findings:  No evidence of intracranial hemorrhage is seen. Faint bilateral basal 
ganglia calcifications are seen. No abnormal extra-axial fluid collections are 
seen.  Mild cortical involutional changes are seen which are not felt to be 
abnormal given the patient's age. Susy Primo evidence for acute hydrocephalus is seen. No evidence of midline shift or herniation is seen.  No abnormal edema pattern 
is seen in a vascular distribution to suggest large artery infarction. Evaluation with bone windows shows no acute osseous changes.  The visualized 
sinuses, middle ears, and mastoid air cells are well aerated.   
Impression:    
IMPRESSION:   
1.  No acute intracranial process evident by noncontrast CT study of the head.  
 
  
XR CHEST SNGL V [[id number]] Collected: 11/18/19 2041 Order Status: Completed Updated: 11/18/19 2044 Narrative:    
EXAM: XR CHEST SNGL V 
 
INDICATION: neutropenic fever COMPARISON: 9/29/2019 FINDINGS: A portable AP radiograph of the chest was obtained at 1946 hours. The 
patient is on a cardiac monitor. The lungs are clear. The cardiac and 
mediastinal contours and pulmonary vascularity are normal.  The bones and soft 
tissues are grossly within normal limits. Impression:    
IMPRESSION: Normal chest.  
XR ELBOW RT MIN 3 V [[id number]] Collected: 11/10/19 1421 Order Status: Completed Updated: 11/10/19 1424 Narrative:    
Right elbow Clinical location: Elbow pain after feeling a pop, decreased range of motion FINDINGS: Four views of the right elbow show no fracture or dislocation. There 
is no joint effusion. The soft tissues are unremarkable. Impression:    
IMPRESSION: No acute osseous abnormality or joint derangement of the right 
elbow. ASSESSMENT: 
Problem List  Date Reviewed: 10/31/2019 Codes Class Noted Febrile neutropenia (HCC) ICD-10-CM: D70.9, R50.81 ICD-9-CM: 288.00, 780.61  9/21/2019 Pancytopenia due to antineoplastic chemotherapy [facility]) ICD-10-CM: D61.810, T45.1X5A 
ICD-9-CM: 284.11, E933.1  6/12/2019 Cellulitis of neck ICD-10-CM: U82.735 ICD-9-CM: 682.1  6/12/2019 Immunocompromised status associated with infection ICD-10-CM: B99.9 ICD-9-CM: 136.9  6/12/2019 Port or reservoir infection ICD-10-CM: Z49.926M ICD-9-CM: 999.33  6/12/2019 Acute myeloid leukemia not having achieved remission ([facility] ICD-10-CM: C92.00 ICD-9-CM: 205.00  5/9/2019 Admission for antineoplastic chemotherapy ICD-10-CM: Z51.11 ICD-9-CM: V58.11  5/5/2019 AML (acute myeloblastic leukemia) ([facility] ICD-10-CM: C92.00 ICD-9-CM: 205.00  4/28/2019 Weakness generalized ICD-10-CM: R53.1 ICD-9-CM: 780.79  4/28/2019 Pancytopenia ([facility] ICD-10-CM: S44.587 ICD-9-CM: 284.19  4/28/2019 Thrombocytopenia ([facility] ICD-10-CM: D69.6 ICD-9-CM: 287.5  4/27/2019 Ms. Zenia Jensen is a 68 y.o. female admitted on 11/7/2019. She is a known patient of Dr. 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 She failed induction with 7+3/Midostaurin. On Dacogen/Gilteritinib with recent BMbx with persistent residual disease. She is being admitted for FLAG-VENITA. She is feeling well and is ready to proceed with treatment. PLAN: 
AML 
- admit for salvage FLAG-VENITA 
- needs Echo prior - ordered 11/8 Day 1 FLAG-VENITA. Echo with EF 55-60%, proceed with tx. 
11/9 Day 2 FLAG-VENITA. Tolerating well, no issues. Uric acid 3.1 today. 11/10 Day 3 FLAG-VENITA. No issues with treatment. Uric acid 3.3 today. 11/12 Day 5 FLAG-VENITA. Tolerating treatment well. G-CSF to start tomorrow. 11/13 Day 6 FLAG-VENITA, doing well, Granix/claritin starts today 12/4 Day 27 FLAG VENITA. Awaiting count recovery, con't Granix. WBC up to 100 today. 12/8 Day 31. WBC up to 300. Con't Granix. Hold on BMbx pending count recovery. 12/9 Day 32. ANC up to 1400. Con't Granix. Plan for BMbx soon. Pancytopenia secondary to disease - Transfuse prn per Alexander SOPs R elbow pain 11/11 c/o R elbow pain with limited ROM yesterday. Xray neg. Pain improved today, noted bruising. Normal ROM on exam. 
 
Mild Abd discomfort/loose stools 11/13 discomfort is improved from yesterday, will monitor 11/14 loose stools, but not watery, can use Imodium prn 
11/15 Imodium working well  
11/16 controlled 11/26 Ova and parasite 11/22 pending. Check for C diff if stool appropriate. Continue anti diarrheal for now Neutropenic fever / Sepsis not present on admission / UTI / Strep/Enterococcus bacteremia 11/19 Tmax 102. 1.  UA +UTI. UCx pending. BCx-NGTD. CXR neg. On Cef/Vanc. DC prophylactic LVQ. 11/20 Tmax 102. BCx positive for streptococcus/enterococcus. Subculture in progress. On Cef/Vanc.   
11/22 repeat BC NTD. Alpha strep on vanc. 11/24 Repeat BC NTD. No fever. 11/25 new skin rash. ?RT to vanc. Consult ID for recommendations. 11/26 ID dc'd cefe and vanc. Started zosyn. TTE ordered. 12/2 Tmax 101. CXR neg.  UA neg. Repeat cultures. On Zosyn, restarted Vanc last PM. 
12/3 Tmax 102. Cultures NGTD. Con't Zosyn/Vanc. 12/4 Tmax 101.8.  ?r/t marrow recovery? UCx-NG. BCx-NGTD. Con't Vanc/Zosyn. ID following - checking stool for giardia/cryptospordium and repeating CMV. 12/5 Tmax 101.9. BCx-NGTD. ID DC'd Vanc yesterday. 12/8 Afebrile. BCx-NGTD. Stool studies and CMV neg. Fungitell <31. Con't Zosyn. ID following. Fall 
11/19 s/p last PM.  No LOC. Hit head. CT head neg. Double vision 11/21 Neuro has seen patient. Concerns for possible 6th nerve palsy. Recommends LP. We will hold with WBC 0.0 and continue to monitor. 11/22 vision improved today. MRI brain pending. 11/23 MRI unremarkable. Discussed with Neuro. No need for LP 
11/27 Resolved Increased O2 needs / Hypoxia 12/5 O2 up to 4L via NC. Check CXR, BNP. Wt up 2# with +1.3L. Lasix 40mg IV x 1 ordered. 12/6 . CXR neg. Diuresed well. O2 down to 2L. Still with crackles, will repeat Lasix today. 12/7 Resolved. On RA Continue home meds Prophylactic Antibx: Acyclovir, Voriconazole Mily SOPs SCDs for DVT prophylaxis (AC contraindicated d/t thrombocytopenia) Goals and plan of care reviewed with the patient. All questions answered to the best of our ability. Disposition: Day 32 FLAG-VENITA. ANC up to 1400. Con't Granix. Plan for repeat BMbx this week. Transfusions per mily SOPs. Upon discharge will need twice weekly labs w transfusions as needed. Weekly provider visits. RTC within 1 week from discharge. Jaswant Durbin NP [facility] Hematology & Oncology [id number] [facility] [place], [address] [facility] Office : [phone] Fax : [phone]

## 2019-12-09 NOTE — PROGRESS NOTES
SW reviewed patient's chart on this date. Patient continues to await count recovery and is currently on Granix. ANC is up to 1400 per oncology progress notes. She is planned for BM biopsy this week. At this time, no anticipated DC needs have been identified. Current DC plan is to return to home whenever medically ready. SW will continue to monitor patient's case during this current admission.

## 2019-12-09 NOTE — PROGRESS NOTES
END OF SHIFT NOTE:7p ~ 7a Intake/Output 24 I&O = +532 Voiding: YES Catheter: NO Stool:  0 occurrences. Stool Assessment Stool Color: Gemma Reeve (12/05/19 1433) Stool Appearance: Soft (12/09/19 0741) Stool Amount: Small (12/05/19 1433) Stool Source/Status: Rectum (12/05/19 0914) Emesis:  0 occurrences. VITAL SIGNS Patient Vitals for the past 12 hrs: 
 Temp Pulse Resp BP SpO2  
12/09/19 0736 98.6 °F (37 °C) 94 18 125/61 93 % 12/09/19 0430 98.8 °F (37.1 °C) 84 18 132/58 94 % 12/08/19 2328     93 % 12/08/19 2327 99.1 °F (37.3 °C) (!) 107 18 141/50 (!) 82 % 12/08/19 2000 98.8 °F (37.1 °C) 94 17 129/70 93 % Pain Assessment Pain 1 Pain Scale 1: Numeric (0 - 10) (12/09/19 0752) Pain Intensity 1: 0 (12/09/19 0752) Patient Stated Pain Goal: 0 (12/09/19 0752) Pain Reassessment 1: Yes (12/08/19 0710) Pain Onset 1: Upon awakening (12/01/19 0242) Pain Location 1: Head (12/01/19 0242) Pain Orientation 1: Lower (12/01/19 0242) Pain Description 1: Aching (12/01/19 0242) Pain Intervention(s) 1: Medication (see MAR) (12/01/19 0242) Ambulating Yes Additional Information: Pt very excited regarding WBC rising this am. Provided emotional support to patient. Afebrile. Generalized fatigue and poor PO intake are noted in pt. Continuing with current POC. Shift report given to oncoming nurse at the bedside.  
 
Tisha Monterroso RN

## 2019-12-10 LAB
ALBUMIN SERPL-MCNC: 2.5 G/DL (ref 3.2–4.6)
ALBUMIN/GLOB SERPL: 0.6 {RATIO} (ref 1.2–3.5)
ALP SERPL-CCNC: 94 U/L (ref 50–136)
ALT SERPL-CCNC: 27 U/L (ref 12–65)
ANION GAP SERPL CALC-SCNC: 7 MMOL/L (ref 7–16)
AST SERPL-CCNC: 17 U/L (ref 15–37)
BASOPHILS # BLD: 0 K/UL (ref 0–0.2)
BASOPHILS NFR BLD: 1 % (ref 0–2)
BILIRUB SERPL-MCNC: 0.3 MG/DL (ref 0.2–1.1)
BUN SERPL-MCNC: 13 MG/DL (ref 8–23)
CALCIUM SERPL-MCNC: 8.8 MG/DL (ref 8.3–10.4)
CHLORIDE SERPL-SCNC: 108 MMOL/L (ref 98–107)
CO2 SERPL-SCNC: 28 MMOL/L (ref 21–32)
CREAT SERPL-MCNC: 0.74 MG/DL (ref 0.6–1)
DIFFERENTIAL METHOD BLD: ABNORMAL
EOSINOPHIL # BLD: 0 K/UL (ref 0–0.8)
EOSINOPHIL NFR BLD: 0 % (ref 0.5–7.8)
ERYTHROCYTE [DISTWIDTH] IN BLOOD BY AUTOMATED COUNT: 12.9 % (ref 11.9–14.6)
GLOBULIN SER CALC-MCNC: 3.9 G/DL (ref 2.3–3.5)
GLUCOSE SERPL-MCNC: 95 MG/DL (ref 65–100)
HCT VFR BLD AUTO: 23.6 % (ref 35.8–46.3)
HGB BLD-MCNC: 7.7 G/DL (ref 11.7–15.4)
IMM GRANULOCYTES # BLD AUTO: 0.3 K/UL (ref 0–0.5)
IMM GRANULOCYTES NFR BLD AUTO: 18 % (ref 0–5)
LDH SERPL L TO P-CCNC: 221 U/L (ref 110–210)
LYMPHOCYTES # BLD: 0.1 K/UL (ref 0.5–4.6)
LYMPHOCYTES NFR BLD: 4 % (ref 13–44)
MAGNESIUM SERPL-MCNC: 2.2 MG/DL (ref 1.8–2.4)
MCH RBC QN AUTO: 29.5 PG (ref 26.1–32.9)
MCHC RBC AUTO-ENTMCNC: 32.6 G/DL (ref 31.4–35)
MCV RBC AUTO: 90.4 FL (ref 79.6–97.8)
MONOCYTES # BLD: 0.2 K/UL (ref 0.1–1.3)
MONOCYTES NFR BLD: 11 % (ref 4–12)
NEUTS SEG # BLD: 0.8 K/UL (ref 1.7–8.2)
NEUTS SEG NFR BLD: 66 % (ref 43–78)
NRBC # BLD: 0.02 K/UL (ref 0–0.2)
PHOSPHATE SERPL-MCNC: 3 MG/DL (ref 2.3–3.7)
PLATELET # BLD AUTO: 29 K/UL (ref 150–450)
PLATELET COMMENTS,PCOM: ABNORMAL
PMV BLD AUTO: 10.5 FL (ref 9.4–12.3)
POTASSIUM SERPL-SCNC: 4.1 MMOL/L (ref 3.5–5.1)
PROT SERPL-MCNC: 6.4 G/DL (ref 6.3–8.2)
RBC # BLD AUTO: 2.61 M/UL (ref 4.05–5.2)
RBC MORPH BLD: ABNORMAL
SODIUM SERPL-SCNC: 143 MMOL/L (ref 136–145)
WBC # BLD AUTO: 1.4 K/UL (ref 4.3–11.1)
WBC MORPH BLD: ABNORMAL

## 2019-12-10 PROCEDURE — 74011250637 HC RX REV CODE- 250/637: Performed by: NURSE PRACTITIONER

## 2019-12-10 PROCEDURE — 83615 LACTATE (LD) (LDH) ENZYME: CPT

## 2019-12-10 PROCEDURE — 85025 COMPLETE CBC W/AUTO DIFF WBC: CPT

## 2019-12-10 PROCEDURE — 74011000258 HC RX REV CODE- 258: Performed by: INTERNAL MEDICINE

## 2019-12-10 PROCEDURE — 74011250636 HC RX REV CODE- 250/636: Performed by: INTERNAL MEDICINE

## 2019-12-10 PROCEDURE — 80053 COMPREHEN METABOLIC PANEL: CPT

## 2019-12-10 PROCEDURE — 99232 SBSQ HOSP IP/OBS MODERATE 35: CPT | Performed by: INTERNAL MEDICINE

## 2019-12-10 PROCEDURE — 65270000015 HC RM PRIVATE ONCOLOGY

## 2019-12-10 PROCEDURE — 65270000029 HC RM PRIVATE

## 2019-12-10 PROCEDURE — 74011250636 HC RX REV CODE- 250/636: Performed by: NURSE PRACTITIONER

## 2019-12-10 PROCEDURE — 83735 ASSAY OF MAGNESIUM: CPT

## 2019-12-10 PROCEDURE — 84100 ASSAY OF PHOSPHORUS: CPT

## 2019-12-10 PROCEDURE — 74011250637 HC RX REV CODE- 250/637: Performed by: INTERNAL MEDICINE

## 2019-12-10 RX ORDER — DRONABINOL 2.5 MG/1
5 CAPSULE ORAL DAILY
Status: DISCONTINUED | OUTPATIENT
Start: 2019-12-10 | End: 2019-12-11

## 2019-12-10 RX ORDER — DRONABINOL 2.5 MG/1
2.5 CAPSULE ORAL DAILY
Status: DISCONTINUED | OUTPATIENT
Start: 2019-12-11 | End: 2019-12-11

## 2019-12-10 RX ADMIN — TBO-FILGRASTIM 480 MCG: 480 INJECTION, SOLUTION SUBCUTANEOUS at 21:35

## 2019-12-10 RX ADMIN — CAMPHOR AND MENTHOL: 5; 5 LOTION TOPICAL at 21:38

## 2019-12-10 RX ADMIN — POTASSIUM CHLORIDE 20 MEQ: 20 TABLET, EXTENDED RELEASE ORAL at 08:19

## 2019-12-10 RX ADMIN — CAMPHOR AND MENTHOL: 5; 5 LOTION TOPICAL at 16:41

## 2019-12-10 RX ADMIN — POTASSIUM CHLORIDE 20 MEQ: 20 TABLET, EXTENDED RELEASE ORAL at 16:41

## 2019-12-10 RX ADMIN — CAMPHOR AND MENTHOL: 5; 5 LOTION TOPICAL at 08:20

## 2019-12-10 RX ADMIN — PIPERACILLIN AND TAZOBACTAM 3.38 G: 3; .375 INJECTION, POWDER, FOR SOLUTION INTRAVENOUS at 14:25

## 2019-12-10 RX ADMIN — Medication 400 MG: at 08:19

## 2019-12-10 RX ADMIN — DULOXETINE 30 MG: 30 CAPSULE, DELAYED RELEASE ORAL at 08:19

## 2019-12-10 RX ADMIN — ACYCLOVIR 400 MG: 800 TABLET ORAL at 16:41

## 2019-12-10 RX ADMIN — VORICONAZOLE 200 MG: 200 TABLET, FILM COATED ORAL at 21:34

## 2019-12-10 RX ADMIN — Medication 400 MG: at 16:41

## 2019-12-10 RX ADMIN — PRAVASTATIN SODIUM 10 MG: 20 TABLET ORAL at 21:34

## 2019-12-10 RX ADMIN — VORICONAZOLE 200 MG: 200 TABLET, FILM COATED ORAL at 08:19

## 2019-12-10 RX ADMIN — TEMAZEPAM 15 MG: 15 CAPSULE ORAL at 21:34

## 2019-12-10 RX ADMIN — LORATADINE 10 MG: 10 TABLET ORAL at 08:19

## 2019-12-10 RX ADMIN — DRONABINOL 2.5 MG: 2.5 CAPSULE ORAL at 08:19

## 2019-12-10 RX ADMIN — ACYCLOVIR 400 MG: 800 TABLET ORAL at 08:20

## 2019-12-10 RX ADMIN — PIPERACILLIN AND TAZOBACTAM 3.38 G: 3; .375 INJECTION, POWDER, FOR SOLUTION INTRAVENOUS at 05:37

## 2019-12-10 RX ADMIN — DIPHENHYDRAMINE HYDROCHLORIDE 25 MG: 25 CAPSULE ORAL at 03:43

## 2019-12-10 RX ADMIN — ALLOPURINOL 300 MG: 300 TABLET ORAL at 08:19

## 2019-12-10 RX ADMIN — ONDANSETRON 4 MG: 2 INJECTION INTRAMUSCULAR; INTRAVENOUS at 16:40

## 2019-12-10 RX ADMIN — LORAZEPAM 1 MG: 1 TABLET ORAL at 21:34

## 2019-12-10 RX ADMIN — PIPERACILLIN AND TAZOBACTAM 3.38 G: 3; .375 INJECTION, POWDER, FOR SOLUTION INTRAVENOUS at 21:35

## 2019-12-10 RX ADMIN — DRONABINOL 5 MG: 2.5 CAPSULE ORAL at 16:40

## 2019-12-10 RX ADMIN — Medication 2 TABLET: at 08:19

## 2019-12-10 NOTE — PROGRESS NOTES
Problem: Falls - Risk of 
Goal: *Absence of Falls Description Document Harpreet Power Fall Risk and appropriate interventions in the flowsheet. Outcome: Progressing Towards Goal 
Note: Fall Risk Interventions: 
Mobility Interventions: Patient to call before getting OOB Medication Interventions: Patient to call before getting OOB Elimination Interventions: Toileting schedule/hourly rounds History of Falls Interventions: Evaluate medications/consider consulting pharmacy, Investigate reason for fall Problem: Anemia Care Plan (Adult and Pediatric) Goal: *Labs within defined limits Outcome: Progressing Towards Goal 
Goal: *Tolerates increased activity Outcome: Progressing Towards Goal 
  
Problem: Diarrhea (Adult and Pediatrics) Goal: *Absence of diarrhea Outcome: Progressing Towards Goal 
  
Problem: Nausea/Vomiting (Adult) Goal: *Absence of nausea/vomiting Outcome: Progressing Towards Goal 
  
Problem: Nutrition Deficit Goal: *Optimize nutritional status Outcome: Progressing Towards Goal

## 2019-12-10 NOTE — PROGRESS NOTES
END OF SHIFT NOTE: 
 
Intake/Output No intake/output data recorded. Voiding: YES Catheter: NO 
Drain:   
 
 
 
 
 
Stool:  1 occurrences. Stool Assessment Stool Color: Read Dylan (12/05/19 1433) Stool Appearance: Soft (12/09/19 0741) Stool Amount: Small (12/05/19 1433) Stool Source/Status: Rectum (12/05/19 0914) Emesis:  0 occurrences. VITAL SIGNS Patient Vitals for the past 12 hrs: 
 Temp Pulse Resp BP SpO2  
12/09/19 1553 98.1 °F (36.7 °C) 94 18 138/67 94 % 12/09/19 1220 98.5 °F (36.9 °C) 97 18 122/60 94 % 12/09/19 0736 98.6 °F (37 °C) 94 18 125/61 93 % Pain Assessment Pain 1 Pain Scale 1: Numeric (0 - 10) (12/09/19 1803) Pain Intensity 1: 0 (12/09/19 1803) Patient Stated Pain Goal: 0 (12/09/19 0752) Pain Reassessment 1: Yes (12/08/19 0710) Pain Onset 1: Upon awakening (12/01/19 0242) Pain Location 1: Head (12/01/19 0242) Pain Orientation 1: Lower (12/01/19 0242) Pain Description 1: Aching (12/01/19 0242) Pain Intervention(s) 1: Medication (see MAR) (12/01/19 0242) Ambulating Yes Shift report given to oncoming nurse, Sandra Barragan at the bedside. Cheko Garza

## 2019-12-10 NOTE — PROGRESS NOTES
0663-Bedside report received from Barry Jean RN. Resting in bed. No needs voiced. No s/s of acute distress. 1800-END OF SHIFT NOTE: 
Pt's WBCs up to 1.4 and ANC is 0.8. Plan for bmbx this admission prior to discharge. Pt had one episode of emesis this shift and received zofran. Pt continuing zosyn. Intake/Output 12/10 0701 - 12/10 1900 In: 200 [P.O.:980] Out: 800 [Urine:800] Voiding: YES Catheter: NO 
Drain:   
 
 
Stool:  0 occurrences. Stool Assessment Stool Color: Voncile Player (12/05/19 1433) Stool Appearance: Soft (12/09/19 0741) Stool Amount: Small (12/05/19 1433) Stool Source/Status: Rectum (12/05/19 0914) Emesis:  1 occurrences. VITAL SIGNS Patient Vitals for the past 12 hrs: 
 Temp Pulse Resp BP SpO2  
12/10/19 1553 98.9 °F (37.2 °C) (!) 105 18 115/52 94 % 12/10/19 1100 98.8 °F (37.1 °C) 95 18 123/55 92 % 12/10/19 0720 99.1 °F (37.3 °C) 92 18 139/62 94 % Pain Assessment Pain 1 Pain Scale 1: Numeric (0 - 10) (12/10/19 1439) Pain Intensity 1: 0 (12/10/19 1439) Patient Stated Pain Goal: 0 (12/10/19 0820) Pain Reassessment 1: Yes (12/08/19 0710) Pain Onset 1: Upon awakening (12/01/19 0242) Pain Location 1: Head (12/01/19 0242) Pain Orientation 1: Lower (12/01/19 0242) Pain Description 1: Aching (12/01/19 0242) Pain Intervention(s) 1: Medication (see MAR) (12/01/19 0242) Ambulating Yes Maria Elena Rai 1850-Bedside shift change report given to Barry Jean RN (oncoming nurse) by Jennifer Gonzales RN (offgoing nurse). Report included the following information SBAR, Kardex, Intake/Output, MAR and Recent Results.

## 2019-12-10 NOTE — PROGRESS NOTES
Nutrition follow-up Reason for initial assessment: Length of Stay  
  
Assessment:  
Food/Nutrition Patient History:  Patient with PMH of AML and hypercholesterolemia admitted for chemo. She is day 33 FLAG-VENITA. On Granix, awaiting count recovery. Patient was seen today in follow-up with  at bedside. She states that she is feeling better, but her appetite still remains compromised. She states that she has continued with same PO trend (yogurt and Ensure for meals), but sometimes will have 2 applesauce instead of yogurt if they do not have the flavor she likes. She states that her  brought her a QT milkshake last evening which she drank all of. She states that she continues to drink Ensure Enlive TID. She also reports that she woke up around 4A today and was hungry and asked for an applesauce. She also reports that she was not nauseous this am like she usually is. 
  
DIET REGULAR   
DIET Nutrition Supplements Ensure Enlive with breakfast, lunch, dinner 
  
Anthropometrics:Height: 5' 6\" (167.6 cm),  Weight: 87.1 kg (192 lb), Weight Source: Standing scale (comment), Body mass index is 30.99 kg/m². BMI class of obese Weight yesterday on standing scale 82.9 kg. This reveals a 9 lbs weight loss in ~1 month (4% loss) which is clinically significant. Macronutrient needs: 87.1 kg Listed body weight QCJ:  4951-8962 NSBF /day (18-20 kcal/kg) QUZ:  90-40 SKVAI protein/day (20% kcal) 
  Intake/Comparative Standards: Ensure Enlive TID providing ~1050 kcal (~67% estimated kcal needs) and ~60 g pro (~78% estimated protein needs). Unable to determine actual intake of kcal and protein for other foods as she is no longer consistent with intake (apple sauce vs. Yogurt). 
  
Nutrition Diagnosis:  
Inadequate oral intake related to poor appetite as evidenced by patient reported barrier to PO intake, decline in intake meeting ~85% estimated energy needs and ~70% of estimated protein needs. 
  
Intervention: Meals and snacks: Continue current diet. Education: Reinforced that Ensure Deyanira Rebollar is only meeting part of her estimated kcal and protein needs. Explained that while 2 applesauce is meeting close to the same kcal as 1 yogurt, it is not providing any protein. Reviewed options for other foods that contain kcal and protein that she may tolerate: milk, ice cream, cream based soups. She is agreeable to try these. Let her know that these items are also available on the floor if she would like to have between meals. Offered her an ice cream, which she accepted and was eating when RD left. Explained that her experiencing hunger is a good sign that her appetite is returning. Nutrition Supplement Nicole Daley Coordination of Care: Junious Schaumann Discharge Nutrition Plan: Too soon to determine. [address] [place] [address], [address], [address]

## 2019-12-10 NOTE — PROGRESS NOTES
Infectious Disease Progress Note Today's Date: 12/10/2019 Admit Date: 2019 Impression: · E faecalis/alpha strep bacteremia (); repeat blood cx (, ) NG ; TTE (-); source Port vs GI. Completed tx 12/3, PORT retained. · AML; admitted for salvage FLAG-VENITA · Febrile neutropenia-afebrile since  · Pancytopenia · Chronic diarrhea-stool and CMV NR. Plan:  
· Continue Zosyn · Continue Voriconazole, ACV · Repeat BMBx planned Anti-infectives: · Alicia He · ACV 
· IV Vancomycin (-) · IV Cefepime (-) · PO LVQ (-) · IV Zosyn (- Subjective: Interval History:  
Itching started today, mild, took benadryl. No fever, chills, nausea. Diarrhea unchanged. Seen with  at bedside. No Known Allergies Review of Systems:  A comprehensive review of systems was negative except for that written in the History of Present Illness. Objective:  
 
Visit Vitals /62 (BP 1 Location: Left arm, BP Patient Position: At rest) Pulse 92 Temp 99.1 °F (37.3 °C) Resp 18 Ht 5' 6\" (1.676 m) Wt 82.9 kg (182 lb 12.8 oz) SpO2 94% BMI 29.50 kg/m² Temp (24hrs), Av.5 °F (36.9 °C), Min:98 °F (36.7 °C), Max:99.1 °F (37.3 °C) General Appearance:  Alert, cooperative, no acute distress, appears stated age Eyes:  Anicteric, no drainage, not injected, EOMI Throat: Mucus membranes moist OP clear Lungs:   Clear throughout lung fields without increased work of breathing or audible wheezes Heart:  Regular rate and rhythm, without audible murmur, rub, or gallop Abdomen:   Soft, non-tender, no guarding, no distention, bowel sounds active Extremities: Extremities normal, atraumatic, no cyanosis or edema Pulses: 2+ and symmetric Skin: Skin color, texture, turgor normal, no rashes or lesions. Lines/Devices: L chest dual port, intact, accessed Data Review: CBC: 
Recent Labs 12/10/19 
0348 19 
04[facility] 19 [facility] 1.4* 0.7* 0.3* GRANS 66 72  -- MONOS 11 9  --   
EOS 0* 0*  --   
ANEU 0.8* 0.5*  -- ABL 0.1* 0.0*  --   
HGB 7.7* 7.2* 7.4* HCT 23.6* 21.7* 22.0*  
PLT 29* 37* 7* BMP: 
Recent Labs 12/10/19 
4032 12/09/19 
0434 12/08/19 0425 CREA 0.74 0.62 0.61  
BUN 13 12 12  142 143  
K 4.1 4.0 4.0  
* 109* 109* CO2 28 29 29 AGAP 7 4* 5*  
GLU 95 83 89 LFTS: 
Recent Labs 12/10/19 
4605 12/09/19 0434 12/08/19 0425 TBILI 0.3 0.3 0.3 ALT 27 27 24 SGOT 17 16 15 AP 94 82 78 TP 6.4 5.8* 5.7* ALB 2.5* 2.1* 2.1* Microbiology:  
 
All Micro Results Procedure Component Value Units Date/Time CMV BY PCR, QT [[id number]] Collected:  12/04/19 0401 Order Status:  Completed Specimen:  Blood from Plasma Updated:  12/06/19 2236 CMV IU/mL NEGATIVE  IU/mL Comment: (NOTE) No CMV DNA detected. The quantitative range of this assay is 200 to 1 million IU/mL. This test was developed and its performance characteristics 
determined by [facility]. It has not been cleared or approved by the 
Food and Drug Administration. The FDA has determined that such 
clearance or approval is not necessary. Performed At: [address] [id number] Kt Canela MD PQ:[id number] CMV log 10 IU/mL TEST NOT PERFORMED log10 IU/mL Comment: (NOTE) Unable to calculate result since non-numeric result obtained for 
component test. 
  
  
 CRYPTOSPORIDIUM, DIRECT DETECTION EIA [[id number]] Collected:  12/03/19 2303 Order Status:  Completed Specimen:  Stool Updated:  12/06/19 6428 Source STOOL Cryptosporidium NEGATIVE Comment: (NOTE) Performed At: [address] [id number] Kt Canela MD [id number] CULTURE, BLOOD [[id number]] Collected:  12/01/19 2003 Order Status:  Completed Specimen:  Blood Updated:  12/06/19 9618   Special Requests: [facility]     
 Culture result: NO GROWTH 5 DAYS     
 CULTURE, BLOOD [[id number]] Collected:  12/01/19 2043 Order Status:  Completed Specimen:  Blood Updated:  12/06/19 0462 Special Requests: --     
  RIGHT Antecubital 
  
  Culture result: NO GROWTH 5 DAYS C. DIFFICILE AG & TOXIN A/B [[id number]] Collected:  12/03/19 2303 Order Status:  Completed Specimen:  Stool Updated:  12/04/19 1311 [id number] [place] ANTIGEN    
  C. DIFFICILE GDH ANTIGEN-NEGATIVE  
     
  C. difficile toxin C. DIFFICILE TOXIN-NEGATIVE  
     
  PCR Reflex NOT APPLICABLE INTERPRETATION    
  NEGATIVE FOR TOXIGENIC C. DIFFICILE Clinical Consideration NEGATIVE FOR TOXIGENIC C. DIFFICILE  
     
 CULTURE, URINE [[id number]] Collected:  12/01/19 2058 Order Status:  Completed Specimen:  Urine from Clean catch Updated:  12/04/19 6125 Special Requests: NO SPECIAL REQUESTS Culture result: NO GROWTH 2 DAYS     
 OVA & PARASITES, STOOL [[id number]] Collected:  11/22/19 4484 Order Status:  Completed Specimen:  Stool Updated:  11/27/19 1236 Source STOOL Ova & Parasite exam Final Report Below Comment: (NOTE) These results were obtained using wet preparation(s) and trichrome 
stained smear. This test does not include testing for Cryptosporidium parvum, Cyclospora, or Microsporidia. Performed At: [address], [address] [place] [address] Asia Marcial MD FJ:[id number] C. DIFFICILE AG & TOXIN A/B [[id number]] Collected:  11/27/19 0525 Order Status:  Completed Specimen:  Stool Updated:  11/27/19 1233 [id number] [place] ANTIGEN    
  C. DIFFICILE GDH ANTIGEN-NEGATIVE  
     
  C. difficile toxin C. DIFFICILE TOXIN-NEGATIVE  
     
  PCR Reflex NOT APPLICABLE INTERPRETATION    
  NEGATIVE FOR TOXIGENIC C. DIFFICILE Clinical Consideration NEGATIVE FOR TOXIGENIC C. DIFFICILE  
     
 CULTURE, BLOOD [[id number]] Collected:  11/21/19 0106 Order Status:  Completed Specimen:  Blood Updated:  11/26/19 9618 Special Requests: --     
  RIGHT 
HAND Culture result: NO GROWTH 5 DAYS     
 CULTURE, BLOOD [[id number]] Collected:  11/20/19 2028 Order Status:  Completed Specimen:  Blood Updated:  11/25/19 6731 Special Requests: PORT Culture result: NO GROWTH 5 DAYS     
 CULTURE, STOOL [[id number]] Collected:  11/22/19 9730 Order Status:  Completed Specimen:  Stool Updated:  11/24/19 9121 Special Requests: NO SPECIAL REQUESTS Culture result:    
  No Salmonella, Shigella, or Ecoli 0157 isolated. NO GROWTH OF GRAM NEGATIVE FECAL REGGIE,  
     
 CULTURE, BLOOD [[id number]] Collected:  11/18/19 2032 Order Status:  Completed Specimen:  Blood Updated:  11/23/19 5304 Special Requests: --     
  RIGHT 
HAND Culture result: NO GROWTH 5 DAYS     
 CULTURE, BLOOD [[id number]]  (Abnormal)  (Susceptibility) Collected:  11/18/19 1955 Order Status:  Completed Specimen:  Blood Updated:  11/22/19 0730 Special Requests: LATERAL PORT     
  GRAM STAIN GRAM POS COCCI IN CHAINS AEROBIC AND ANAEROBIC BOTTLES RESULTS VERIFIED, PHONED TO AND READ BACK BY EJ CORREA RN @ 8062 ON 11/19/2019 BY AMM Culture result:    
  ENTEROCOCCUS FAECALIS GROUP D ALPHA STREPTOCOCCUS, NOT S. PNEUMONIAE THIS ORGANISM MAY BE INDICATIVE OF CULTURE CONTAMINATION, HOWEVER, CLINICAL CORRELATION NEEDS TO BE EVALUATED, AS EACH CASE IS UNIQUE. REFER TO Christopher Muller Dr PANEL [id number] CULTURE, URINE [[id number]] Collected:  11/19/19 0102 Order Status:  Completed Specimen:  Urine from Clean catch Updated:  11/21/19 0710 Special Requests: NO SPECIAL REQUESTS Culture result: NO GROWTH 2 DAYS     
 BLOOD CULTURE ID PANEL [[id number]]  (Abnormal) Collected:  11/18/19 1955 Order Status:  Completed Specimen:  Blood Updated:  11/19/19 1420 Acc. no. from [facility] [id number] Enterococcus DETECTED Streptococcus DETECTED Comment: RESULTS VERIFIED, PHONED TO AND READ BACK BY 
EJ CORREA RN @ 5488 ON 11/19/2019 BY AMM Cleora Romberg A/B (Vancomycin Resistance Gene) NOT DETECTED Clinical Consideration Multiple organisms detected. Results do not replace susceptibility testing. Jn Pollard [[id number]] Collected:  11/19/19 0415 Order Status:  Canceled Specimen:  Stool Imaging:  
Reviewed: CXR 12/5/19 No active disease CXR 12/1/19 IMPRESSION: No acute cardiopulmonary abnormality. No infiltrate appreciated. Signed By: Rosalba Goff NP December 10, 2019

## 2019-12-10 NOTE — PROGRESS NOTES
[employer] [facility] Inpatient Hematology / Oncology Daily Progress Note Reason for Admission:  Admission for antineoplastic chemotherapy [Z51.11] 24 Hour Events: 
Afebrile, VSS Day 33 FLAG-VENITA ANC up to 800 Awaiting count recovery, on Granix C/o lack of appetite  at bedside ROS: 
Constitutional: Negative for fever, fatigue. CV: Negative for chest pain, palpitations, edema. Respiratory: Negative for dyspnea, cough, wheezing. GI: Negative for nausea, abdominal pain, diarrhea 10 point review of systems is otherwise negative with the exception of the elements mentioned above in the HPI. No Known Allergies Past Medical History:  
Diagnosis Date  AML (acute myeloid leukemia) ([facility] [address] dx- 4/2019  
 followed by dr Ruthann Nielson  Depression  Hypercholesterolemia  Infection   
 of port -- was placed 5/2019-- removed 6/2019---right chest  
 Psychiatric disorder   
 aniexty  Sleep apnea Past Surgical History:  
Procedure Laterality Date  HX OTHER SURGICAL    
 colonoscopy  HX VASCULAR ACCESS    
 IR INSERT TUNL CVC W PORT OVER 5 YEARS  4/30/2019  IR INSERT TUNL CVC W PORT OVER 5 YEARS  7/15/2019  IR REMOVE TUNL CVAD W PORT/PUMP  6/13/2019 Family History Problem Relation Age of Onset  Cancer Father Social History Socioeconomic History  Marital status:  Spouse name: Not on file  Number of children: Not on file  Years of education: Not on file  Highest education level: Not on file Occupational History  Not on file Social Needs  Financial resource strain: Not on file  Food insecurity:  
  Worry: Not on file Inability: Not on file  Transportation needs:  
  Medical: Not on file Non-medical: Not on file Tobacco Use  Smoking status: Never Smoker  Smokeless tobacco: Never Used Substance and Sexual Activity  Alcohol use: Never Frequency: Never  Drug use: Never  Sexual activity: Not on file Lifestyle  Physical activity:  
  Days per week: Not on file Minutes per session: Not on file  Stress: Not on file Relationships  Social connections:  
  Talks on phone: Not on file Gets together: Not on file Attends [employer]ism service: Not on file Active member of club or organization: Not on file Attends meetings of clubs or organizations: Not on file Relationship status: Not on file  Intimate partner violence:  
  Fear of current or ex partner: Not on file Emotionally abused: Not on file Physically abused: Not on file Forced sexual activity: Not on file Other Topics Concern [address] Service Not Asked  Blood Transfusions Not Asked  Caffeine Concern Not Asked  Occupational Exposure Not Asked [place] Hazards Not Asked  Sleep Concern Not Asked  Stress Concern Not Asked  Weight Concern Not Asked  Special Diet Not Asked  Back Care Not Asked  Exercise Not Asked  Bike Helmet Not Asked  Seat Belt Not Asked  Self-Exams Not Asked Social History Narrative  Not on file Current Facility-Administered Medications Medication Dose Route Frequency Provider Last Rate Last Dose  [START ON 12/11/2019] dronabinol (MARINOL) capsule 2.5 mg  2.5 mg Oral DAILY Ede Russell NP      
 dronabinol (MARINOL) capsule 5 mg  5 mg Oral DAILY Ede Russell NP      
 diphenoxylate-atropine (LOMOTIL) tablet 1 Tab  1 Tab Oral QID PRN Christina Benites MD   1 Tab at 12/06/19 1434  
 magnesium oxide (MAG-OX) tablet 400 mg  400 mg Oral BID Ede Russell NP   400 mg at 12/10/19 0819  
 0.9% sodium chloride infusion 250 mL  250 mL IntraVENous PRN Dominique Ochoa MD 15 mL/hr at 12/09/19 0642 250 mL at 12/09/19 9288  
 0.9% sodium chloride infusion 250 mL  250 mL IntraVENous PRN Dominique Ochoa MD      
 acetaminophen/diphenhydrAMINE (TYLENOL PM EXT STR) 500/25 mg (Patient Supplied)   Oral QHS PRN Carlosbird Reeves NP      
 temazepam (RESTORIL) capsule 15 mg  15 mg Oral QHS Joel Schmitt NP   15 mg at 12/09/19 2127  camphor-menthol (SARNA) 0.5-0.5 % lotion   Topical TID Nery Carson NP      
 piperacillin-tazobactam (ZOSYN) 3.375 g in 0.9% sodium chloride (MBP/ADV) 100 mL  3.375 g IntraVENous Q8H Hamilton Deutsch MD 25 mL/hr at 12/10/19 0537 3.375 g at 12/10/19 0537  
 baclofen (LIORESAL) tablet 5 mg  5 mg Oral Q8H PRN César Christopher MD   5 mg at 11/21/19 1335  potassium chloride (K-DUR, KLOR-CON) SR tablet 20 mEq  20 mEq Oral BID César Christopher MD   20 mEq at 12/10/19 0819  
 albuterol (PROVENTIL VENTOLIN) nebulizer solution 2.5 mg  2.5 mg Nebulization Q6H PRN Joel Schmitt NP   2.5 mg at 11/21/19 1600  
 alum-mag hydroxide-simeth (MYLANTA) oral suspension 30 mL  30 mL Oral Q4H PRN César Christopher MD   30 mL at 11/30/19 5188  loperamide (IMODIUM) capsule 2 mg  2 mg Oral Q4H PRN César Christopher MD   2 mg at 12/06/19 2212  loratadine (CLARITIN) tablet 10 mg  10 mg Oral DAILY Rosenda Sin NP   10 mg at 12/10/19 0819  
 central line flush (saline) syringe 10 mL  10 mL InterCATHeter PRN César Christopher MD   10 mL at 11/28/19 2109  
 docusate sodium (COLACE) capsule 100 mg  100 mg Oral BID PRN Leocaditia Bustillo NP   100 mg at 11/11/19 1308  LORazepam (ATIVAN) injection 0.5 mg  0.5 mg IntraVENous Q6H PRN César Christopher MD   0.5 mg at 11/27/19 1201  
 saline peripheral flush soln 10 mL  10 mL InterCATHeter PRN César Christopher MD   10 mL at 11/23/19 2229  
 heparin (porcine) pf 300-500 Units  300-500 Units InterCATHeter PRN César Christopher MD   300 Units at 12/01/19 0245  tbo-filgrastim (GRANIX) injection 480 mcg  480 mcg SubCUTAneous QHS César Christopher MD   480 mcg at 12/09/19 2126  acyclovir (ZOVIRAX) tablet 400 mg  400 mg Oral BID Leocadia Pastel, NP   400 mg at 12/10/19 0820  
 allopurinol (ZYLOPRIM) tablet 300 mg  300 mg Oral DAILY Leocadia Pastel, NP 300 mg at 12/10/19 9379  cholecalciferol (VITAMIN D3) (400 Units /10 mcg) tablet 2 Tab  800 Units Oral DAILY Rai Torres NP   2 Tab at 12/10/19 6762  DULoxetine (CYMBALTA) capsule 30 mg  30 mg Oral DAILY Rai Torres NP   30 mg at 12/10/19 6926  lidocaine-prilocaine (EMLA) 2.5-2.5 % cream   Topical PRN Rai Torres NP      
 LORazepam (ATIVAN) tablet 1 mg  1 mg Oral QHS Rai Torres, NP   1 mg at 19 2127  
 pravastatin (PRAVACHOL) tablet 10 mg  10 mg Oral QHS Rai Torres, NP   10 mg at 19  voriconazole (VFEND) tablet 200 mg  200 mg Oral Q12H Rai Torres, NP   200 mg at 12/10/19 0819  
 ondansetron (ZOFRAN) injection 4 mg  4 mg IntraVENous Q4H PRN Rai Torres NP   4 mg at 19 1724  prochlorperazine (COMPAZINE) with saline injection 5 mg  5 mg IntraVENous Q6H PRN Rai Torres NP   5 mg at 19 1107  
 HYDROcodone-acetaminophen (NORCO) 5-325 mg per tablet 1 Tab  1 Tab Oral Q6H PRN Rai Torres NP   1 Tab at 19 4612  morphine injection 2 mg  2 mg IntraVENous Q4H PRN Rai Torres NP      
 acetaminophen (TYLENOL) tablet 650 mg  650 mg Oral Q6H PRN Malen Balloon, MD   650 mg at 19 1306  diphenhydrAMINE (BENADRYL) capsule 25 mg  25 mg Oral Q6H PRN Malen Balloon MD   25 mg at 12/10/19 2423  vit C,P-Ei-hsglx-lutein-zeaxan (PRESERVISION AREDS-2) capsule 1 Cap (Patient Supplied)  1 Cap Oral DAILY Malemarkus Balloon, MD   1 Cap at 12/10/19 4186 OBJECTIVE: 
Patient Vitals for the past 8 hrs: 
 BP Temp Pulse Resp SpO2  
12/10/19 1100 123/55 98.8 °F (37.1 °C) 95 18 92 % 12/10/19 0720 139/62 99.1 °F (37.3 °C) 92 18 94 % 12/10/19 0346 145/67 98 °F (36.7 °C) (!) 106 18 92 % Temp (24hrs), Av.6 °F (37 °C), Min:98 °F (36.7 °C), Max:99.1 °F (37.3 °C) 
 
12/10 0701 - 12/10 190 In: 200 [P.O.:980] Out: - Physical Exam: 
Constitutional: Well developed, frail appearing female in no acute distress, sitting comfortably in the hospital bed. HEENT: Normocephalic and atraumatic. Oropharynx is clear, mucous membranes are moist. Extraocular muscles are intact. Sclerae anicteric. Skin Warm and dry. Scattered rash. No erythema. No pallor. Bruising noted on BUE/R elbow. Respiratory Lungs CTAB,  normal air exchange without accessory muscle use. CVS Normal rate, regular rhythm and normal S1 and S2. No murmurs, gallops, or rubs. Abdomen Soft, nontender, nondistended, normoactive bowel sounds. Neuro Grossly nonfocal with no obvious sensory or motor deficits. MSK Normal range of motion in general.  No edema and no tenderness. Psych Appropriate mood and affect. Labs:   
Recent Results (from the past 24 hour(s)) METABOLIC PANEL, COMPREHENSIVE Collection Time: 12/10/19  3:48 AM  
Result Value Ref Range Sodium 143 136 - 145 mmol/L Potassium 4.1 3.5 - 5.1 mmol/L Chloride 108 (H) 98 - 107 mmol/L  
 CO2 28 21 - 32 mmol/L Anion gap 7 7 - 16 mmol/L Glucose 95 65 - 100 mg/dL BUN 13 8 - 23 MG/DL Creatinine 0.74 0.6 - 1.0 MG/DL  
 GFR est AA >60 >60 ml/min/1.73m2 GFR est non-AA >60 >60 ml/min/1.73m2 Calcium 8.8 8.3 - 10.4 MG/DL Bilirubin, total 0.3 0.2 - 1.1 MG/DL  
 ALT (SGPT) 27 12 - 65 U/L  
 AST (SGOT) 17 15 - 37 U/L Alk. phosphatase 94 50 - 136 U/L Protein, total 6.4 6.3 - 8.2 g/dL Albumin 2.5 (L) 3.2 - 4.6 g/dL Globulin 3.9 (H) 2.3 - 3.5 g/dL A-G Ratio 0.6 (L) 1.2 - 3.5 MAGNESIUM Collection Time: 12/10/19  3:48 AM  
Result Value Ref Range Magnesium 2.2 1.8 - 2.4 mg/dL LD Collection Time: 12/10/19  3:48 AM  
Result Value Ref Range  (H) 110 - 210 U/L  
PHOSPHORUS Collection Time: 12/10/19  3:48 AM  
Result Value Ref Range Phosphorus 3.0 2.3 - 3.7 MG/DL  
CBC WITH AUTOMATED DIFF Collection Time: 12/10/19  3:48 AM  
Result Value Ref Range WBC 1.4 (LL) 4.3 - 11.1 K/uL  
 RBC 2.61 (L) 4.05 - 5.2 M/uL HGB 7.7 (L) 11.7 - 15.4 g/dL HCT 23.6 (L) 35.8 - 46.3 % MCV 90.4 79.6 - 97.8 FL  
 MCH 29.5 26.1 - 32.9 PG  
 MCHC 32.6 31.4 - 35.0 g/dL  
 RDW 12.9 11.9 - 14.6 % PLATELET 29 (LL) 298 - 450 K/uL MPV 10.5 9.4 - 12.3 FL ABSOLUTE NRBC 0.02 0.0 - 0.2 K/uL NEUTROPHILS 66 43 - 78 % LYMPHOCYTES 4 (L) 13 - 44 % MONOCYTES 11 4.0 - 12.0 % EOSINOPHILS 0 (L) 0.5 - 7.8 % BASOPHILS 1 0.0 - 2.0 % IMMATURE GRANULOCYTES 18 (H) 0.0 - 5.0 %  
 ABS. NEUTROPHILS 0.8 (L) 1.7 - 8.2 K/UL  
 ABS. LYMPHOCYTES 0.1 (L) 0.5 - 4.6 K/UL  
 ABS. MONOCYTES 0.2 0.1 - 1.3 K/UL  
 ABS. EOSINOPHILS 0.0 0.0 - 0.8 K/UL  
 ABS. BASOPHILS 0.0 0.0 - 0.2 K/UL  
 ABS. IMM. GRANS. 0.3 0.0 - 0.5 K/UL  
 RBC COMMENTS NORMOCYTIC/NORMOCHROMIC    
 WBC COMMENTS Result Confirmed By Smear PLATELET COMMENTS MARKED    
 DF AUTOMATED Imaging: 
CT HEAD WO CONT [[id number]] Collected: 11/19/19 1050 Order Status: Completed Updated: 11/19/19 1054 Narrative:    
CT HEAD WITHOUT CONTRAST, 11/19/2019 History: Fall last night hitting left side of head Comparison: . Technique:   5 mm axial scans from the skull base to the vertex. All CT scans 
performed at this facility use one or all of the following: Automated exposure 
control, adjustment of the mA and/or kVp according to patient's size, iterative 
reconstruction. Findings:  No evidence of intracranial hemorrhage is seen. Faint bilateral basal 
ganglia calcifications are seen. No abnormal extra-axial fluid collections are 
seen.  Mild cortical involutional changes are seen which are not felt to be 
abnormal given the patient's age. [place] evidence for acute hydrocephalus is seen. No evidence of midline shift or herniation is seen.  No abnormal edema pattern 
is seen in a vascular distribution to suggest large artery infarction. Evaluation with bone windows shows no acute osseous changes.  The visualized 
sinuses, middle ears, and mastoid air cells are well aerated.   
Impression:    
IMPRESSION:   
 1.  No acute intracranial process evident by noncontrast CT study of the head. XR CHEST SNGL V [[id number]] Collected: 11/18/19 2041 Order Status: Completed Updated: 11/18/19 2044 Narrative:    
EXAM: XR CHEST SNGL V 
 
INDICATION: neutropenic fever COMPARISON: 9/29/2019 FINDINGS: A portable AP radiograph of the chest was obtained at 1946 hours. The 
patient is on a cardiac monitor. The lungs are clear. The cardiac and 
mediastinal contours and pulmonary vascularity are normal.  The bones and soft 
tissues are grossly within normal limits. Impression:    
IMPRESSION: Normal chest.  
XR ELBOW RT MIN 3 V [[id number]] Collected: 11/10/19 1421 Order Status: Completed Updated: 11/10/19 1424 Narrative:    
Right elbow Clinical location: Elbow pain after feeling a pop, decreased range of motion FINDINGS: Four views of the right elbow show no fracture or dislocation. There 
is no joint effusion. The soft tissues are unremarkable. Impression:    
IMPRESSION: No acute osseous abnormality or joint derangement of the right 
elbow. ASSESSMENT: 
Problem List  Date Reviewed: 10/31/2019 Codes Class Noted Febrile neutropenia (HCC) ICD-10-CM: D70.9, R50.81 ICD-9-CM: 288.00, 780.61  9/21/2019 Pancytopenia due to antineoplastic chemotherapy [facility]) ICD-10-CM: D61.810, T45.1X5A 
ICD-9-CM: 284.11, E933.1  6/12/2019 Cellulitis of neck ICD-10-CM: P05.032 ICD-9-CM: 682.1  6/12/2019 Immunocompromised status associated with infection ICD-10-CM: B99.9 ICD-9-CM: 136.9  6/12/2019 Port or reservoir infection ICD-10-CM: U73.580V ICD-9-CM: 999.33  6/12/2019 Acute myeloid leukemia not having achieved remission ([facility] ICD-10-CM: C92.00 ICD-9-CM: 205.00  5/9/2019 Admission for antineoplastic chemotherapy ICD-10-CM: Z51.11 ICD-9-CM: V58.11  5/5/2019 AML (acute myeloblastic leukemia) ([facility] ICD-10-CM: C92.00 ICD-9-CM: 205.00  4/28/2019 Weakness generalized ICD-10-CM: R53.1 ICD-9-CM: 780.79  4/28/2019 Pancytopenia ([facility] ICD-10-CM: E17.748 ICD-9-CM: 284.19  4/28/2019 Thrombocytopenia ([facility] ICD-10-CM: D69.6 ICD-9-CM: 287.5  4/27/2019 Ms. Abrahan Stapleton is a 68 y.o. female admitted on 11/7/2019. She is a known patient of Dr. 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 She failed induction with 7+3/Midostaurin. On Dacogen/Gilteritinib with recent BMbx with persistent residual disease. She is being admitted for FLAG-VENITA. She is feeling well and is ready to proceed with treatment. PLAN: 
AML 
- admit for salvage FLAG-VENITA 
- needs Echo prior - ordered 11/8 Day 1 FLAG-VENITA. Echo with EF 55-60%, proceed with tx. 
11/9 Day 2 FLAG-VENITA. Tolerating well, no issues. Uric acid 3.1 today. 11/10 Day 3 FLAG-VENITA. No issues with treatment. Uric acid 3.3 today. 11/12 Day 5 FLAG-VENITA. Tolerating treatment well. G-CSF to start tomorrow. 11/13 Day 6 FLAG-VENITA, doing well, Granix/claritin starts today 12/4 Day 27 FLAG VENITA. Awaiting count recovery, con't Granix. WBC up to 100 today. 12/8 Day 31. WBC up to 300. Con't Granix. Hold on BMbx pending count recovery. 12/10 Day 33. ANC up to 800. Con't Granix. Dr. Swartz Para to discuss timing of repeat BMbx with Dr. Claude Ramos. Pancytopenia secondary to disease - Transfuse prn per [facility] SOPs R elbow pain 11/11 c/o R elbow pain with limited ROM yesterday. Xray neg. Pain improved today, noted bruising. Normal ROM on exam. 
 
Mild Abd discomfort/loose stools 11/13 discomfort is improved from yesterday, will monitor 11/14 loose stools, but not watery, can use Imodium prn 
11/15 Imodium working well  
11/16 controlled 11/26 Ova and parasite 11/22 pending. Check for C diff if stool appropriate. Continue anti diarrheal for now Neutropenic fever / Sepsis not present on admission / UTI / Strep/Enterococcus bacteremia 11/19 Tmax 102. 1.  UA +UTI. UCx pending. BCx-NGTD. CXR neg. On Cef/Vanc. DC prophylactic LVQ. 11/20 Tmax 102. BCx positive for streptococcus/enterococcus. Subculture in progress. On Cef/Vanc.   
11/22 repeat BC NTD. Alpha strep on vanc. 11/24 Repeat BC NTD. No fever. 11/25 new skin rash. ?RT to vanc. Consult ID for recommendations. 11/26 ID dc'd cefe and vanc. Started zosyn. TTE ordered. 12/2 Tmax 101. CXR neg.  UA neg. Repeat cultures. On Zosyn, restarted Vanc last PM. 
12/3 Tmax 102. Cultures NGTD. Con't Zosyn/Vanc. 12/4 Tmax 101.8.  ?r/t marrow recovery? UCx-NG. BCx-NGTD. Con't Vanc/Zosyn. ID following - checking stool for giardia/cryptospordium and repeating CMV. 12/5 Tmax 101.9. BCx-NGTD. ID DC'd Vanc yesterday. 12/8 Afebrile. BCx-NGTD. Stool studies and CMV neg. Fungitell <31. Con't Zosyn. ID following. 12/10 Con't Zosyn for now per ID. Fall 
11/19 s/p last PM.  No LOC. Hit head. CT head neg. Double vision 11/21 Neuro has seen patient. Concerns for possible 6th nerve palsy. Recommends LP. We will hold with WBC 0.0 and continue to monitor. 11/22 vision improved today. MRI brain pending. 11/23 MRI unremarkable. Discussed with Neuro. No need for LP 
11/27 Resolved Increased O2 needs / Hypoxia 12/5 O2 up to 4L via NC. Check CXR, BNP. Wt up 2# with +1.3L. Lasix 40mg IV x 1 ordered. 12/6 . CXR neg. Diuresed well. O2 down to 2L. Still with crackles, will repeat Lasix today. 12/7 Resolved. On RA Lack of appetite 12/4 Increase marinol 2.5mg from daily to BID. 
12/10 ongoing lack of appetite. Increase Marinol to 2.5mg in AM and 5mg in PM.  RD following. Continue home meds Prophylactic Antibx: Acyclovir, Voriconazole Alexander SOPs SCDs for DVT prophylaxis (AC contraindicated d/t thrombocytopenia) Goals and plan of care reviewed with the patient. All questions answered to the best of our ability. Disposition: Day 33 FLAG-VENITA. ANC up to 800. Con't Granix. Dr. Margareth Guthrie to discuss timing of repeat BMbx with Dr. Zeenat Oglesby. Transfusions per mily SOPs. Upon discharge will need twice weekly labs w transfusions as needed. Weekly provider visits. RTC within 1 week from discharge. Matilde Huber NP [facility] [address] [place] [address] Office : [phone] Fax : [phone]

## 2019-12-11 LAB
ALBUMIN SERPL-MCNC: 2.4 G/DL (ref 3.2–4.6)
ALBUMIN/GLOB SERPL: 0.7 {RATIO} (ref 1.2–3.5)
ALP SERPL-CCNC: 98 U/L (ref 50–136)
ALT SERPL-CCNC: 21 U/L (ref 12–65)
ANION GAP SERPL CALC-SCNC: 5 MMOL/L (ref 7–16)
AST SERPL-CCNC: 13 U/L (ref 15–37)
BASOPHILS # BLD: 0 K/UL (ref 0–0.2)
BASOPHILS NFR BLD: 2 % (ref 0–2)
BILIRUB SERPL-MCNC: 0.4 MG/DL (ref 0.2–1.1)
BUN SERPL-MCNC: 11 MG/DL (ref 8–23)
CALCIUM SERPL-MCNC: 9 MG/DL (ref 8.3–10.4)
CHLORIDE SERPL-SCNC: 110 MMOL/L (ref 98–107)
CO2 SERPL-SCNC: 29 MMOL/L (ref 21–32)
CREAT SERPL-MCNC: 0.68 MG/DL (ref 0.6–1)
DIFFERENTIAL METHOD BLD: ABNORMAL
EOSINOPHIL # BLD: 0 K/UL (ref 0–0.8)
EOSINOPHIL NFR BLD: 0 % (ref 0.5–7.8)
ERYTHROCYTE [DISTWIDTH] IN BLOOD BY AUTOMATED COUNT: 12.8 % (ref 11.9–14.6)
GLOBULIN SER CALC-MCNC: 3.6 G/DL (ref 2.3–3.5)
GLUCOSE SERPL-MCNC: 92 MG/DL (ref 65–100)
HCT VFR BLD AUTO: 21.9 % (ref 35.8–46.3)
HGB BLD-MCNC: 7.3 G/DL (ref 11.7–15.4)
IMM GRANULOCYTES # BLD AUTO: 0.2 K/UL (ref 0–0.5)
IMM GRANULOCYTES NFR BLD AUTO: 10 % (ref 0–5)
LYMPHOCYTES # BLD: 0.1 K/UL (ref 0.5–4.6)
LYMPHOCYTES NFR BLD: 3 % (ref 13–44)
MAGNESIUM SERPL-MCNC: 2.3 MG/DL (ref 1.8–2.4)
MCH RBC QN AUTO: 30.3 PG (ref 26.1–32.9)
MCHC RBC AUTO-ENTMCNC: 33.3 G/DL (ref 31.4–35)
MCV RBC AUTO: 90.9 FL (ref 79.6–97.8)
MONOCYTES # BLD: 0.2 K/UL (ref 0.1–1.3)
MONOCYTES NFR BLD: 10 % (ref 4–12)
NEUTS SEG # BLD: 1.6 K/UL (ref 1.7–8.2)
NEUTS SEG NFR BLD: 75 % (ref 43–78)
NRBC # BLD: 0.03 K/UL (ref 0–0.2)
PLATELET # BLD AUTO: 21 K/UL (ref 150–450)
PLATELET COMMENTS,PCOM: ABNORMAL
PMV BLD AUTO: 12 FL (ref 9.4–12.3)
POTASSIUM SERPL-SCNC: 4.1 MMOL/L (ref 3.5–5.1)
PROT SERPL-MCNC: 6 G/DL (ref 6.3–8.2)
RBC # BLD AUTO: 2.41 M/UL (ref 4.05–5.2)
RBC MORPH BLD: ABNORMAL
SODIUM SERPL-SCNC: 144 MMOL/L (ref 136–145)
WBC # BLD AUTO: 2.1 K/UL (ref 4.3–11.1)
WBC MORPH BLD: ABNORMAL

## 2019-12-11 PROCEDURE — 99232 SBSQ HOSP IP/OBS MODERATE 35: CPT | Performed by: INTERNAL MEDICINE

## 2019-12-11 PROCEDURE — 85025 COMPLETE CBC W/AUTO DIFF WBC: CPT

## 2019-12-11 PROCEDURE — 74011250637 HC RX REV CODE- 250/637: Performed by: NURSE PRACTITIONER

## 2019-12-11 PROCEDURE — 80053 COMPREHEN METABOLIC PANEL: CPT

## 2019-12-11 PROCEDURE — 83735 ASSAY OF MAGNESIUM: CPT

## 2019-12-11 PROCEDURE — 74011250636 HC RX REV CODE- 250/636: Performed by: INTERNAL MEDICINE

## 2019-12-11 PROCEDURE — 36591 DRAW BLOOD OFF VENOUS DEVICE: CPT

## 2019-12-11 PROCEDURE — 65270000015 HC RM PRIVATE ONCOLOGY

## 2019-12-11 PROCEDURE — 74011250637 HC RX REV CODE- 250/637: Performed by: INTERNAL MEDICINE

## 2019-12-11 PROCEDURE — 65270000029 HC RM PRIVATE

## 2019-12-11 PROCEDURE — 74011000258 HC RX REV CODE- 258: Performed by: INTERNAL MEDICINE

## 2019-12-11 RX ORDER — AMOXICILLIN AND CLAVULANATE POTASSIUM 875; 125 MG/1; MG/1
1 TABLET, FILM COATED ORAL 2 TIMES DAILY WITH MEALS
Status: DISCONTINUED | OUTPATIENT
Start: 2019-12-11 | End: 2019-12-12 | Stop reason: HOSPADM

## 2019-12-11 RX ORDER — DRONABINOL 2.5 MG/1
2.5 CAPSULE ORAL
Status: DISCONTINUED | OUTPATIENT
Start: 2019-12-11 | End: 2019-12-12 | Stop reason: HOSPADM

## 2019-12-11 RX ADMIN — CAMPHOR AND MENTHOL: 5; 5 LOTION TOPICAL at 21:38

## 2019-12-11 RX ADMIN — CAMPHOR AND MENTHOL: 5; 5 LOTION TOPICAL at 08:11

## 2019-12-11 RX ADMIN — POTASSIUM CHLORIDE 20 MEQ: 20 TABLET, EXTENDED RELEASE ORAL at 08:09

## 2019-12-11 RX ADMIN — DIPHENHYDRAMINE HYDROCHLORIDE 25 MG: 25 CAPSULE ORAL at 21:37

## 2019-12-11 RX ADMIN — LORAZEPAM 0.5 MG: 2 INJECTION INTRAMUSCULAR; INTRAVENOUS at 17:02

## 2019-12-11 RX ADMIN — Medication 400 MG: at 08:09

## 2019-12-11 RX ADMIN — PRAVASTATIN SODIUM 10 MG: 20 TABLET ORAL at 21:37

## 2019-12-11 RX ADMIN — TEMAZEPAM 15 MG: 15 CAPSULE ORAL at 21:37

## 2019-12-11 RX ADMIN — Medication 2 TABLET: at 08:09

## 2019-12-11 RX ADMIN — CAMPHOR AND MENTHOL: 5; 5 LOTION TOPICAL at 17:06

## 2019-12-11 RX ADMIN — ACYCLOVIR 400 MG: 800 TABLET ORAL at 08:09

## 2019-12-11 RX ADMIN — LORAZEPAM 1 MG: 1 TABLET ORAL at 21:37

## 2019-12-11 RX ADMIN — ALLOPURINOL 300 MG: 300 TABLET ORAL at 08:09

## 2019-12-11 RX ADMIN — POTASSIUM CHLORIDE 20 MEQ: 20 TABLET, EXTENDED RELEASE ORAL at 17:02

## 2019-12-11 RX ADMIN — PIPERACILLIN AND TAZOBACTAM 3.38 G: 3; .375 INJECTION, POWDER, FOR SOLUTION INTRAVENOUS at 06:43

## 2019-12-11 RX ADMIN — DRONABINOL 2.5 MG: 2.5 CAPSULE ORAL at 17:03

## 2019-12-11 RX ADMIN — Medication 400 MG: at 17:03

## 2019-12-11 RX ADMIN — DRONABINOL 2.5 MG: 2.5 CAPSULE ORAL at 08:09

## 2019-12-11 RX ADMIN — LORATADINE 10 MG: 10 TABLET ORAL at 08:09

## 2019-12-11 RX ADMIN — ACYCLOVIR 400 MG: 800 TABLET ORAL at 17:02

## 2019-12-11 RX ADMIN — AMOXICILLIN AND CLAVULANATE POTASSIUM 1 TABLET: 875; 125 TABLET, FILM COATED ORAL at 17:02

## 2019-12-11 RX ADMIN — TBO-FILGRASTIM 480 MCG: 480 INJECTION, SOLUTION SUBCUTANEOUS at 21:38

## 2019-12-11 RX ADMIN — VORICONAZOLE 200 MG: 200 TABLET, FILM COATED ORAL at 08:09

## 2019-12-11 RX ADMIN — DULOXETINE 30 MG: 30 CAPSULE, DELAYED RELEASE ORAL at 08:09

## 2019-12-11 RX ADMIN — VORICONAZOLE 200 MG: 200 TABLET, FILM COATED ORAL at 21:37

## 2019-12-11 NOTE — PROGRESS NOTES
Problem: Falls - Risk of 
Goal: *Absence of Falls Description Document Devere Blandon Fall Risk and appropriate interventions in the flowsheet. Outcome: Progressing Towards Goal 
Note: Fall Risk Interventions: 
Mobility Interventions: Patient to call before getting OOB, Communicate number of staff needed for ambulation/transfer Medication Interventions: Patient to call before getting OOB Elimination Interventions: Toilet paper/wipes in reach, Toileting schedule/hourly rounds History of Falls Interventions: Evaluate medications/consider consulting pharmacy Problem: Anemia Care Plan (Adult and Pediatric) Goal: *Labs within defined limits Outcome: Progressing Towards Goal 
Goal: *Tolerates increased activity Outcome: Progressing Towards Goal 
  
Problem: Diarrhea (Adult and Pediatrics) Goal: *Absence of diarrhea Outcome: Progressing Towards Goal 
  
Problem: Nausea/Vomiting (Adult) Goal: *Absence of nausea/vomiting Outcome: Progressing Towards Goal 
  
Problem: Nutrition Deficit Goal: *Optimize nutritional status Outcome: Progressing Towards Goal

## 2019-12-11 NOTE — PROGRESS NOTES
[facility] Hematology & Oncology Inpatient Hematology / Oncology Daily Progress Note Reason for Admission:  Admission for antineoplastic chemotherapy [Z51.11] 24 Hour Events: 
Afebrile, VSS Day 34 FLAG-VENITA ANC up to 1600 Continue Granix Appetite good this morning  at bedside ROS: 
Constitutional: Negative for fever, fatigue. CV: Negative for chest pain, palpitations, edema. Respiratory: Negative for dyspnea, cough, wheezing. GI: Negative for nausea, abdominal pain, diarrhea 10 point review of systems is otherwise negative with the exception of the elements mentioned above in the HPI. No Known Allergies Past Medical History:  
Diagnosis Date  AML (acute myeloid leukemia) ([facility] dx- 4/2019  
 followed by dr Didier Webb  Depression  Hypercholesterolemia  Infection   
 of port -- was placed 5/2019-- removed 6/2019---right chest  
 Psychiatric disorder   
 aniexty  Sleep apnea Past Surgical History:  
Procedure Laterality Date  HX OTHER SURGICAL    
 colonoscopy  HX VASCULAR ACCESS    
 IR INSERT TUNL CVC W PORT OVER 5 YEARS  4/30/2019  IR INSERT TUNL CVC W PORT OVER 5 YEARS  7/15/2019  IR REMOVE TUNL CVAD W PORT/PUMP  6/13/2019 Family History Problem Relation Age of Onset  Cancer Father Social History Socioeconomic History  Marital status:  Spouse name: Not on file  Number of children: Not on file  Years of education: Not on file  Highest education level: Not on file Occupational History  Not on file Social Needs  Financial resource strain: Not on file  Food insecurity:  
  Worry: Not on file Inability: Not on file  Transportation needs:  
  Medical: Not on file Non-medical: Not on file Tobacco Use  Smoking status: Never Smoker  Smokeless tobacco: Never Used Substance and Sexual Activity  Alcohol use: Never Frequency: Never  Drug use: Never  Sexual activity: Not on file Lifestyle  Physical activity:  
  Days per week: Not on file Minutes per session: Not on file  Stress: Not on file Relationships  Social connections:  
  Talks on phone: Not on file Gets together: Not on file Attends [employer] service: Not on file Active member of club or organization: Not on file Attends meetings of clubs or organizations: Not on file Relationship status: Not on file  Intimate partner violence:  
  Fear of current or ex partner: Not on file Emotionally abused: Not on file Physically abused: Not on file Forced sexual activity: Not on file Other Topics Concern [address] Service Not Asked  Blood Transfusions Not Asked  Caffeine Concern Not Asked  Occupational Exposure Not Asked Marcial Rivera Hazards Not Asked  Sleep Concern Not Asked  Stress Concern Not Asked  Weight Concern Not Asked  Special Diet Not Asked  Back Care Not Asked  Exercise Not Asked  Bike Helmet Not Asked  Seat Belt Not Asked  Self-Exams Not Asked Social History Narrative  Not on file Current Facility-Administered Medications Medication Dose Route Frequency Provider Last Rate Last Dose  dronabinol (MARINOL) capsule 2.5 mg  2.5 mg Oral DAILY Lucinda Jovel NP   2.5 mg at 12/11/19 1735  dronabinol (MARINOL) capsule 5 mg  5 mg Oral DAILY Lucinda Jovel NP   5 mg at 12/10/19 1640  diphenoxylate-atropine (LOMOTIL) tablet 1 Tab  1 Tab Oral QID PRN Valerie Farr MD   1 Tab at 12/06/19 1434  
 magnesium oxide (MAG-OX) tablet 400 mg  400 mg Oral BID Lucinda Jovel NP   400 mg at 12/11/19 0809  
 0.9% sodium chloride infusion 250 mL  250 mL IntraVENous PRN Meggan Whitlock MD 15 mL/hr at 12/09/19 0642 250 mL at 12/09/19 3367  
 0.9% sodium chloride infusion 250 mL  250 mL IntraVENous PRN Meggan Whitlock MD      
 acetaminophen/diphenhydrAMINE (TYLENOL PM EXT STR) 500/25 mg (Patient Supplied)   Oral QHS PRN Veronika Burgos NP      
 temazepam (RESTORIL) capsule 15 mg  15 mg Oral QHS Sasha Azevedo NP   15 mg at 12/10/19 2134  camphor-menthol (SARNA) 0.5-0.5 % lotion   Topical TID Codie Pereyra NP      
 piperacillin-tazobactam (ZOSYN) 3.375 g in 0.9% sodium chloride (MBP/ADV) 100 mL  3.375 g IntraVENous Q8H Jessica Campbell MD 25 mL/hr at 12/11/19 0643 3.375 g at 12/11/19 1462  baclofen (LIORESAL) tablet 5 mg  5 mg Oral Q8H PRN Riki Allen MD   5 mg at 11/21/19 1335  potassium chloride (K-DUR, KLOR-CON) SR tablet 20 mEq  20 mEq Oral BID Riki Allen MD   20 mEq at 12/11/19 0809  
 albuterol (PROVENTIL VENTOLIN) nebulizer solution 2.5 mg  2.5 mg Nebulization Q6H PRN Sasha Azevedo NP   2.5 mg at 11/21/19 1600  
 alum-mag hydroxide-simeth (MYLANTA) oral suspension 30 mL  30 mL Oral Q4H PRN Riki Allen MD   30 mL at 11/30/19 1927  loperamide (IMODIUM) capsule 2 mg  2 mg Oral Q4H PRN Riki Allen MD   2 mg at 12/06/19 2212  loratadine (CLARITIN) tablet 10 mg  10 mg Oral DAILY Rutha Angel, NP   10 mg at 12/11/19 0210  central line flush (saline) syringe 10 mL  10 mL InterCATHeter PRN Riki Allen MD   10 mL at 11/28/19 2109  
 docusate sodium (COLACE) capsule 100 mg  100 mg Oral BID PRN Colon Grapes, NP   100 mg at 11/11/19 1308  LORazepam (ATIVAN) injection 0.5 mg  0.5 mg IntraVENous Q6H PRN Riki Allen MD   0.5 mg at 11/27/19 1201  
 saline peripheral flush soln 10 mL  10 mL InterCATHeter PRN Riki Allen MD   10 mL at 11/23/19 2229  
 heparin (porcine) pf 300-500 Units  300-500 Units InterCATHeter PRN Riki Allen MD   300 Units at 12/01/19 0245  tbo-filgrastim (GRANIX) injection 480 mcg  480 mcg SubCUTAneous QHS Riki Allen MD   480 mcg at 12/10/19 2135  acyclovir (ZOVIRAX) tablet 400 mg  400 mg Oral BID Colon Catherines, NP   400 mg at 12/11/19 1130  allopurinol (ZYLOPRIM) tablet 300 mg  300 mg Oral DAILY Colon Eunice, NP 300 mg at 19 3421  cholecalciferol (VITAMIN D3) (400 Units /10 mcg) tablet 2 Tab  800 Units Oral DAILY Azra Angst, NP   2 Tab at 19 0398  DULoxetine (CYMBALTA) capsule 30 mg  30 mg Oral DAILY Azra Angst, NP   30 mg at 19 1551  lidocaine-prilocaine (EMLA) 2.5-2.5 % cream   Topical PRN Azra Angst, NP      
 LORazepam (ATIVAN) tablet 1 mg  1 mg Oral QHS Azra Angst, NP   1 mg at 12/10/19 2134  pravastatin (PRAVACHOL) tablet 10 mg  10 mg Oral QHS Azra Angst, NP   10 mg at 12/10/19 2134  voriconazole (VFEND) tablet 200 mg  200 mg Oral Q12H Azra Angst, NP   200 mg at 19 0809  
 ondansetron (ZOFRAN) injection 4 mg  4 mg IntraVENous Q4H PRN Azra Angst, NP   4 mg at 12/10/19 1640  prochlorperazine (COMPAZINE) with saline injection 5 mg  5 mg IntraVENous Q6H PRN Azra Angst, NP   5 mg at 19 1107  
 HYDROcodone-acetaminophen (NORCO) 5-325 mg per tablet 1 Tab  1 Tab Oral Q6H PRN Azra Angst, NP   1 Tab at 19 7989  morphine injection 2 mg  2 mg IntraVENous Q4H PRN Azra Angst, NP      
 acetaminophen (TYLENOL) tablet 650 mg  650 mg Oral Q6H PRN Clare Cook MD   650 mg at 19 1306  diphenhydrAMINE (BENADRYL) capsule 25 mg  25 mg Oral Q6H PRN Clare Cook MD   25 mg at 12/10/19 3510  vit C,Q-Wr-nwwcn-lutein-zeaxan (PRESERVISION AREDS-2) capsule 1 Cap (Patient Supplied)  1 Cap Oral DAILY Clare Cook MD   1 Cap at 19 5658 OBJECTIVE: 
Patient Vitals for the past 8 hrs: 
 BP Temp Pulse Resp SpO2  
19 0740 117/71 98.5 °F (36.9 °C) (!) 106 16 97 % 19 0417 124/57 98.3 °F (36.8 °C) 89 18 92 % Temp (24hrs), Av.6 °F (37 °C), Min:98.3 °F (36.8 °C), Max:98.9 °F (37.2 °C) 
 
 0701 -  1900 In: -  
Out: 798 [VIVZS:[id number]] Physical Exam: 
Constitutional: Well developed, well nourished female in no acute distress, sitting comfortably in the hospital bed. HEENT: Normocephalic and atraumatic. Oropharynx is clear, mucous membranes are moist. Extraocular muscles are intact. Sclerae anicteric. Skin Warm and dry. No erythema. + pallor at baseline. Bruising noted on BUE/R elbow. Respiratory Lungs CTAB,  normal air exchange without accessory muscle use. CVS Mild tachycardic rate, regular rhythm and normal S1 and S2. No murmurs, gallops, or rubs. Abdomen Soft, nontender, nondistended, normoactive bowel sounds. Neuro Grossly nonfocal with no obvious sensory or motor deficits. MSK Normal range of motion in general.  No edema and no tenderness. Psych Appropriate mood and affect. Labs:   
Recent Results (from the past 24 hour(s)) METABOLIC PANEL, COMPREHENSIVE Collection Time: 12/11/19  3:59 AM  
Result Value Ref Range Sodium 144 136 - 145 mmol/L Potassium 4.1 3.5 - 5.1 mmol/L Chloride 110 (H) 98 - 107 mmol/L  
 CO2 29 21 - 32 mmol/L Anion gap 5 (L) 7 - 16 mmol/L Glucose 92 65 - 100 mg/dL BUN 11 8 - 23 MG/DL Creatinine 0.68 0.6 - 1.0 MG/DL  
 GFR est AA >60 >60 ml/min/1.73m2 GFR est non-AA >60 >60 ml/min/1.73m2 Calcium 9.0 8.3 - 10.4 MG/DL Bilirubin, total 0.4 0.2 - 1.1 MG/DL  
 ALT (SGPT) 21 12 - 65 U/L  
 AST (SGOT) 13 (L) 15 - 37 U/L Alk. phosphatase 98 50 - 136 U/L Protein, total 6.0 (L) 6.3 - 8.2 g/dL Albumin 2.4 (L) 3.2 - 4.6 g/dL Globulin 3.6 (H) 2.3 - 3.5 g/dL A-G Ratio 0.7 (L) 1.2 - 3.5 MAGNESIUM Collection Time: 12/11/19  3:59 AM  
Result Value Ref Range Magnesium 2.3 1.8 - 2.4 mg/dL CBC WITH AUTOMATED DIFF Collection Time: 12/11/19  3:59 AM  
Result Value Ref Range WBC 2.1 (L) 4.3 - 11.1 K/uL  
 RBC 2.41 (L) 4.05 - 5.2 M/uL HGB 7.3 (L) 11.7 - 15.4 g/dL HCT 21.9 (L) 35.8 - 46.3 % MCV 90.9 79.6 - 97.8 FL  
 MCH 30.3 26.1 - 32.9 PG  
 MCHC 33.3 31.4 - 35.0 g/dL  
 RDW 12.8 11.9 - 14.6 % PLATELET 21 (LL) 326 - 450 K/uL  MPV 12.0 9.4 - 12.3 FL  
 ABSOLUTE NRBC 0.03 0.0 - 0.2 K/uL NEUTROPHILS 75 43 - 78 % LYMPHOCYTES 3 (L) 13 - 44 % MONOCYTES 10 4.0 - 12.0 % EOSINOPHILS 0 (L) 0.5 - 7.8 % BASOPHILS 2 0.0 - 2.0 % IMMATURE GRANULOCYTES 10 (H) 0.0 - 5.0 %  
 ABS. NEUTROPHILS 1.6 (L) 1.7 - 8.2 K/UL  
 ABS. LYMPHOCYTES 0.1 (L) 0.5 - 4.6 K/UL  
 ABS. MONOCYTES 0.2 0.1 - 1.3 K/UL  
 ABS. EOSINOPHILS 0.0 0.0 - 0.8 K/UL  
 ABS. BASOPHILS 0.0 0.0 - 0.2 K/UL  
 ABS. IMM. GRANS. 0.2 0.0 - 0.5 K/UL  
 RBC COMMENTS OCCASIONAL 
MACROCYTOSIS 
    
 WBC COMMENTS Result Confirmed By Smear PLATELET COMMENTS MARKED    
 DF AUTOMATED Imaging: 
CT HEAD WO CONT [[id number]] Collected: 11/19/19 1050 Order Status: Completed Updated: 11/19/19 1054 Narrative:    
CT HEAD WITHOUT CONTRAST, 11/19/2019 History: Fall last night hitting left side of head Comparison: . Technique:   5 mm axial scans from the skull base to the vertex. All CT scans 
performed at this facility use one or all of the following: Automated exposure 
control, adjustment of the mA and/or kVp according to patient's size, iterative 
reconstruction. Findings:  No evidence of intracranial hemorrhage is seen. Faint bilateral basal 
ganglia calcifications are seen. No abnormal extra-axial fluid collections are 
seen.  Mild cortical involutional changes are seen which are not felt to be 
abnormal given the patient's age. Yvone Risk evidence for acute hydrocephalus is seen. No evidence of midline shift or herniation is seen.  No abnormal edema pattern 
is seen in a vascular distribution to suggest large artery infarction. Evaluation with bone windows shows no acute osseous changes.  The visualized 
sinuses, middle ears, and mastoid air cells are well aerated. Impression:    
IMPRESSION:   
1.  No acute intracranial process evident by noncontrast CT study of the head. XR CHEST SNGL V [[id number]] Collected: 11/18/19 2041 Order Status: Completed Updated: 11/18/19 2044 Narrative:    
EXAM: XR CHEST SNGL V 
 
INDICATION: neutropenic fever COMPARISON: 9/29/2019 FINDINGS: A portable AP radiograph of the chest was obtained at 1946 hours. The 
patient is on a cardiac monitor. The lungs are clear. The cardiac and 
mediastinal contours and pulmonary vascularity are normal.  The bones and soft 
tissues are grossly within normal limits. Impression:    
IMPRESSION: Normal chest.  
XR ELBOW RT MIN 3 V [[id number]] Collected: 11/10/19 1421 Order Status: Completed Updated: 11/10/19 1424 Narrative:    
Right elbow Clinical location: Elbow pain after feeling a pop, decreased range of motion FINDINGS: Four views of the right elbow show no fracture or dislocation. There 
is no joint effusion. The soft tissues are unremarkable. Impression:    
IMPRESSION: No acute osseous abnormality or joint derangement of the right 
elbow. ASSESSMENT: 
Problem List  Date Reviewed: 10/31/2019 Codes Class Noted Febrile neutropenia (HCC) ICD-10-CM: D70.9, R50.81 ICD-9-CM: 288.00, 780.61  9/21/2019 Pancytopenia due to antineoplastic chemotherapy [facility]) ICD-10-CM: D61.810, T45.1X5A 
ICD-9-CM: 284.11, E933.1  6/12/2019 Cellulitis of neck ICD-10-CM: O54.935 ICD-9-CM: 682.1  6/12/2019 Immunocompromised status associated with infection ICD-10-CM: B99.9 ICD-9-CM: 136.9  6/12/2019 Port or reservoir infection ICD-10-CM: Y21.612G ICD-9-CM: 999.33  6/12/2019 Acute myeloid leukemia not having achieved remission ([facility] ICD-10-CM: C92.00 ICD-9-CM: 205.00  5/9/2019 Admission for antineoplastic chemotherapy ICD-10-CM: Z51.11 ICD-9-CM: V58.11  5/5/2019 AML (acute myeloblastic leukemia) ([facility] ICD-10-CM: C92.00 ICD-9-CM: 205.00  4/28/2019 Weakness generalized ICD-10-CM: R53.1 ICD-9-CM: 780.79  4/28/2019 Pancytopenia ([facility] ICD-10-CM: T90.256 ICD-9-CM: 284.19  4/28/2019 Thrombocytopenia ([facility] ICD-10-CM: D69.6 ICD-9-CM: 287.5  4/27/2019 Ms. Mely Houston is a 68 y.o. female admitted on 11/7/2019. She is a known patient of Dr. 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 She failed induction with 7+3/Midostaurin. On Dacogen/Gilteritinib with recent BMbx with persistent residual disease. She is being admitted for FLAG-VENITA. She is feeling well and is ready to proceed with treatment. PLAN: 
AML 
- admit for salvage FLAG-VENITA 
- needs Echo prior - ordered 11/8 Day 1 FLAG-VENITA. Echo with EF 55-60%, proceed with tx. 
11/9 Day 2 FLAG-VENITA. Tolerating well, no issues. Uric acid 3.1 today. 11/10 Day 3 FLAG-VENITA. No issues with treatment. Uric acid 3.3 today. 11/12 Day 5 FLAG-VENITA. Tolerating treatment well. G-CSF to start tomorrow. 11/13 Day 6 FLAG-VENITA, doing well, Granix/claritin starts today 12/4 Day 27 FLAG VENITA. Awaiting count recovery, con't Granix. WBC up to 100 today. 12/8 Day 31. WBC up to 300. Con't Granix. Hold on BMbx pending count recovery. 12/10 Day 33. ANC up to 800. Con't Granix. Dr. Keller Sport to discuss timing of repeat BMbx with Dr. Edy Adkins. 12/11 Day 34. ANC 1600 - continue Granix for now. BMBx today or tomorrow and home after. Pancytopenia secondary to disease - Transfuse prn per Alexander SOPs R elbow pain 11/11 c/o R elbow pain with limited ROM yesterday. Xray neg. Pain improved today, noted bruising. Normal ROM on exam. 
 
Mild Abd discomfort/loose stools 11/13 discomfort is improved from yesterday, will monitor 11/14 loose stools, but not watery, can use Imodium prn 
11/15 Imodium working well  
11/16 controlled 11/26 Ova and parasite 11/22 pending. Check for C diff if stool appropriate. Continue anti diarrheal for now Neutropenic fever / Sepsis not present on admission / UTI / Strep/Enterococcus bacteremia 11/19 Tmax 102. 1.  UA +UTI. UCx pending. BCx-NGTD. CXR neg. On Cef/Vanc. DC prophylactic LVQ. 11/20 Tmax 102. BCx positive for streptococcus/enterococcus. Subculture in progress. On Cef/Vanc.   
11/22 repeat BC NTD. Alpha strep on vanc. 11/24 Repeat BC NTD. No fever. 11/25 new skin rash. ?RT to vanc. Consult ID for recommendations. 11/26 ID dc'd cefe and vanc. Started zosyn. TTE ordered. 12/2 Tmax 101. CXR neg.  UA neg. Repeat cultures. On Zosyn, restarted Vanc last PM. 
12/3 Tmax 102. Cultures NGTD. Con't Zosyn/Vanc. 12/4 Tmax 101.8.  ?r/t marrow recovery? UCx-NG. BCx-NGTD. Con't Vanc/Zosyn. ID following - checking stool for giardia/cryptospordium and repeating CMV. 12/5 Tmax 101.9. BCx-NGTD. ID DC'd Vanc yesterday. 12/8 Afebrile. BCx-NGTD. Stool studies and CMV neg. Fungitell <31. Con't Zosyn. ID following. 12/10 Con't Zosyn for now per ID. Fall 
11/19 s/p last PM.  No LOC. Hit head. CT head neg. Double vision 11/21 Neuro has seen patient. Concerns for possible 6th nerve palsy. Recommends LP. We will hold with WBC 0.0 and continue to monitor. 11/22 vision improved today. MRI brain pending. 11/23 MRI unremarkable. Discussed with Neuro. No need for LP 
11/27 Resolved Increased O2 needs / Hypoxia 12/5 O2 up to 4L via NC. Check CXR, BNP. Wt up 2# with +1.3L. Lasix 40mg IV x 1 ordered. 12/6 . CXR neg. Diuresed well. O2 down to 2L. Still with crackles, will repeat Lasix today. 12/7 Resolved. On RA Lack of appetite 12/4 Increase marinol 2.5mg from daily to BID. 
12/10 ongoing lack of appetite. Increase Marinol to 2.5mg in AM and 5mg in PM.  RD following. 12/11 Feeling better this morning. Will keep Marinol 2.5 mg BID. Continue home meds Prophylactic Antibx: Acyclovir, Voriconazole Alexander SOPs SCDs for DVT prophylaxis (AC contraindicated d/t thrombocytopenia) Goals and plan of care reviewed with the patient. All questions answered to the best of our ability. Disposition: Day 34 FLAG-VENITA. ANC up to 1600 - continue Granix.  Plan for BMBx today or tomorrow in IR and then home. ID would like to go home on Augmentin for prophylaxis for right now - can switch back to Levaquin in the near future. Transfusions per mily SOPs. Upon discharge will need twice weekly labs with transfusions as needed. Weekly provider visits. RTC within 1 week from discharge. Adriana Oneal NP [facility] Hematology & Oncology [address] [place] [address] Office : [phone] Fax : [phone]

## 2019-12-11 NOTE — PROGRESS NOTES
Infectious Disease Progress Note Today's Date: 2019 Admit Date: 2019 Impression: · E faecalis/alpha strep bacteremia (); repeat blood cx (, ) NG ; TTE (-); source Port vs GI. PORT retained · AML; admitted for salvage FLAG-VENITA · Febrile neutropenia-afebrile since  · Pancytopenia · Chronic diarrhea-stool and CMV NR. Plan: · Transition to oral Augmentin today and would treat until oncology follow up. If WBC recovered at follow up, then would discontinue oral prophylaxis. · I discussed with patient/ that port is retained and that she will need to be watched when / if antibiotics are discontinued to make sure no infection relapses. · ID will sign off. Please call with questions or concerns. Anti-infectives: · IV zosyn Subjective:  
Date of Consultation:  2019 Doing well; ready for BM biopsy, planned for tomorrow; No Known Allergies Review of Systems:  A comprehensive review of systems was negative except for that written in the History of Present Illness. Objective:  
 
Visit Vitals /62 Pulse (!) 105 Temp 98.2 °F (36.8 °C) Resp 18 Ht 5' 6\" (1.676 m) Wt 81.7 kg (180 lb 3.2 oz) SpO2 95% BMI 29.09 kg/m² Temp (24hrs), Av.5 °F (36.9 °C), Min:98.2 °F (36.8 °C), Max:98.9 °F (37.2 °C) Lines:  Central Venous Catheter:   port Physical Exam:   
General:  In no distress Eyes:    
Mouth/Throat:   
Neck:   
Lungs:   Breathing comfortably CV:    
Abdomen:     
Extremities: Mild edema noted Skin: No rash noted Lymph nodes: Musculoskeletal:   
Lines/Devices:  L chest port in place Psych: Alert and oriented Data Review: CBC: 
Recent Labs 19 
0056 12/10/19 
0348 19 
9834 WBC 2.1* 1.4* 0.7* GRANS 75 66 72 MONOS 10 11 9 EOS 0* 0* 0* ANEU 1.6* 0.8* 0.5* ABL 0.1* 0.1* 0.0* HGB 7.3* 7.7* 7.2* HCT 21.9* 23.6* 21.7*  
PLT 21* 29* 37* BMP: 
Recent Labs 12/11/19 
0227 12/10/19 
0348 12/09/19 
7037 CREA 0.68 0.74 0.62 BUN 11 13 12  143 142  
K 4.1 4.1 4.0  
* 108* 109* CO2 29 28 29 AGAP 5* 7 4* GLU 92 95 83 LFTS: 
Recent Labs 12/11/19 
9937 12/10/19 
0348 12/09/19 
1695 TBILI 0.4 0.3 0.3 ALT 21 27 27 SGOT 13* 17 16 AP 98 94 82  
TP 6.0* 6.4 5.8* ALB 2.4* 2.5* 2.1* Microbiology:  
 
All Micro Results Procedure Component Value Units Date/Time CMV BY PCR, QT [[id number]] Collected:  12/04/19 0401 Order Status:  Completed Specimen:  Blood from Plasma Updated:  12/06/19 2236 CMV IU/mL NEGATIVE  IU/mL Comment: (NOTE) No CMV DNA detected. The quantitative range of this assay is 200 to 1 million IU/mL. This test was developed and its performance characteristics 
determined by [facility]. It has not been cleared or approved by the 
Food and Drug Administration. The FDA has determined that such 
clearance or approval is not necessary. Performed At: [address] [id number] Caty Mayes MD [id number] CMV log 10 IU/mL TEST NOT PERFORMED log10 IU/mL Comment: (NOTE) Unable to calculate result since non-numeric result obtained for 
component test. 
  
  
 CRYPTOSPORIDIUM, DIRECT DETECTION EIA [[id number]] Collected:  12/03/19 2303 Order Status:  Completed Specimen:  Stool Updated:  12/06/19 9530 Source STOOL Cryptosporidium NEGATIVE Comment: (NOTE) Performed At: [address] [id number] Caty Mayes MD [id number] CULTURE, BLOOD [[id number]] Collected:  12/01/19 2003 Order Status:  Completed Specimen:  Blood Updated:  12/06/19 3529 Special Requests: MEDIAL PORT Culture result: NO GROWTH 5 DAYS     
 CULTURE, BLOOD [[id number]] Collected:  12/01/19 2043 Order Status:  Completed Specimen:  Blood Updated:  12/06/19 0193 Special Requests: --     
  RIGHT Antecubital 
  
  Culture result: NO GROWTH 5 DAYS C. DIFFICILE AG & TOXIN A/B [[id number]] Collected:  12/03/19 2303 Order Status:  Completed Specimen:  Stool Updated:  12/04/19 1311 [address] [place] ANTIGEN    
  C. DIFFICILE GDH ANTIGEN-NEGATIVE  
     
  C. difficile toxin C. DIFFICILE TOXIN-NEGATIVE  
     
  PCR Reflex NOT APPLICABLE INTERPRETATION    
  NEGATIVE FOR TOXIGENIC C. DIFFICILE Clinical Consideration NEGATIVE FOR TOXIGENIC C. DIFFICILE  
     
 CULTURE, URINE [[id number]] Collected:  12/01/19 2058 Order Status:  Completed Specimen:  Urine from Clean catch Updated:  12/04/19 2111 Special Requests: NO SPECIAL REQUESTS Culture result: NO GROWTH 2 DAYS     
 OVA & PARASITES, STOOL [[id number]] Collected:  11/22/19 1870 Order Status:  Completed Specimen:  Stool Updated:  11/27/19 1236 Source STOOL Ova & Parasite exam Final Report Below Comment: (NOTE) These results were obtained using wet preparation(s) and trichrome 
stained smear. This test does not include testing for Cryptosporidium parvum, Cyclospora, or Microsporidia. Performed At: [address], [address] [place] [address] Viktoria Barthel MD [id number] C. DIFFICILE AG & TOXIN A/B [[id number]] Collected:  11/27/19 0525 Order Status:  Completed Specimen:  Stool Updated:  11/27/19 1233 [id number] [place] ANTIGEN    
  C. DIFFICILE GDH ANTIGEN-NEGATIVE  
     
  C. difficile toxin C. DIFFICILE TOXIN-NEGATIVE  
     
  PCR Reflex NOT APPLICABLE INTERPRETATION    
  NEGATIVE FOR TOXIGENIC C. DIFFICILE Clinical Consideration NEGATIVE FOR TOXIGENIC C. DIFFICILE  
     
 CULTURE, BLOOD [[id number]] Collected:  11/21/19 0106 Order Status:  Completed Specimen:  Blood Updated:  11/26/19 1217 Special Requests: --     
  RIGHT 
HAND Culture result: NO GROWTH 5 DAYS     
 CULTURE, BLOOD [[id number]] Collected:  11/20/19 2028 Order Status:  Completed Specimen:  Blood Updated:  11/25/19 2514 Special Requests: PORT Culture result: NO GROWTH 5 DAYS     
 CULTURE, STOOL [[id number]] Collected:  11/22/19 1872 Order Status:  Completed Specimen:  Stool Updated:  11/24/19 7637 Special Requests: NO SPECIAL REQUESTS Culture result:    
  No Salmonella, Shigella, or Ecoli 0157 isolated. NO GROWTH OF GRAM NEGATIVE FECAL REGGIE,  
     
 CULTURE, BLOOD [[id number]] Collected:  11/18/19 2032 Order Status:  Completed Specimen:  Blood Updated:  11/23/19 7450 Special Requests: --     
  RIGHT 
HAND Culture result: NO GROWTH 5 DAYS     
 CULTURE, BLOOD [[id number]]  (Abnormal)  (Susceptibility) Collected:  11/18/19 1955 Order Status:  Completed Specimen:  Blood Updated:  11/22/19 0730 Special Requests: LATERAL PORT     
  GRAM STAIN GRAM POS COCCI IN CHAINS AEROBIC AND ANAEROBIC BOTTLES RESULTS VERIFIED, PHONED TO AND READ BACK BY EJ CORREA RN @ 6001 ON 11/19/2019 BY AMM Culture result:    
  ENTEROCOCCUS FAECALIS GROUP D ALPHA STREPTOCOCCUS, NOT S. PNEUMONIAE THIS ORGANISM MAY BE INDICATIVE OF CULTURE CONTAMINATION, HOWEVER, CLINICAL CORRELATION NEEDS TO BE EVALUATED, AS EACH CASE IS UNIQUE. REFER TO [facility] Renzo Mendoza PANEL [id number] CULTURE, URINE [[id number]] Collected:  11/19/19 0102 Order Status:  Completed Specimen:  Urine from Clean catch Updated:  11/21/19 0710 Special Requests: NO SPECIAL REQUESTS Culture result: NO GROWTH 2 DAYS     
 BLOOD CULTURE ID PANEL [[id number]]  (Abnormal) Collected:  11/18/19 1955 Order Status:  Completed Specimen:  Blood Updated:  11/19/19 1420 Acc. no. from [employer] [id number] Enterococcus DETECTED Streptococcus DETECTED Comment: RESULTS VERIFIED, PHONED TO AND READ BACK BY 
EJ CORREA RN @ 3051 ON 11/19/2019 BY [facility] van A/B (Vancomycin Resistance Gene) NOT DETECTED Clinical Consideration Multiple organisms detected. Results do not replace susceptibility testing. Merrick Ellitot [[id number]] Collected:  11/19/19 0415 Order Status:  Canceled Specimen:  Stool Imaging: No new imaging Signed By: Tere Thompson MD   
 December 11, 2019

## 2019-12-11 NOTE — PROGRESS NOTES
END OF SHIFT NOTE: 
 
Intake/Output 12/11 0701 - 12/11 1900 In: 855 [P.O.:855] Out: 600 [Urine:600] Voiding: YES Catheter: NO 
Drain:   
 
 
 
 
 
Stool:  0 occurrences. Stool Assessment Stool Color: Parker Mitts (12/05/19 1433) Stool Appearance: Soft (12/11/19 0809) Stool Amount: Small (12/05/19 1433) Stool Source/Status: Rectum (12/05/19 0914) Emesis:  0 occurrences. VITAL SIGNS Patient Vitals for the past 12 hrs: 
 Temp Pulse Resp BP SpO2  
12/11/19 1144 98.2 °F (36.8 °C) (!) 105 18 140/62 95 % 12/11/19 0740 98.5 °F (36.9 °C) (!) 106 16 117/71 97 % Pain Assessment Pain 1 Pain Scale 1: Numeric (0 - 10) (12/11/19 0417) Pain Intensity 1: 0 (12/11/19 0417) Patient Stated Pain Goal: 0 (12/11/19 0417) Pain Reassessment 1: Yes (12/08/19 0710) Pain Onset 1: Upon awakening (12/01/19 0242) Pain Location 1: Head (12/01/19 0242) Pain Orientation 1: Lower (12/01/19 0242) Pain Description 1: Aching (12/01/19 0242) Pain Intervention(s) 1: Medication (see MAR) (12/01/19 0242) Ambulating Yes Additional Information: Pt ambulating in room. Bone marrow biopsy planned for Friday at 11. Will d/c after. ID d/c Zosyn and started PO augmentin. Pt nauseated during shift. IV ativan given, tolerated well. No other needs at this time. Shift report given to oncoming nurse at the bedside. Liz Trejodler

## 2019-12-12 ENCOUNTER — APPOINTMENT (OUTPATIENT)
Dept: CT IMAGING | Age: 74
DRG: 837 | End: 2019-12-12
Attending: INTERNAL MEDICINE
Payer: MEDICARE

## 2019-12-12 VITALS
SYSTOLIC BLOOD PRESSURE: 135 MMHG | HEIGHT: 66 IN | WEIGHT: 180.1 LBS | OXYGEN SATURATION: 92 % | HEART RATE: 88 BPM | BODY MASS INDEX: 28.94 KG/M2 | DIASTOLIC BLOOD PRESSURE: 64 MMHG | RESPIRATION RATE: 16 BRPM | TEMPERATURE: 98.6 F

## 2019-12-12 LAB
ALBUMIN SERPL-MCNC: 2.5 G/DL (ref 3.2–4.6)
ALBUMIN/GLOB SERPL: 0.7 {RATIO} (ref 1.2–3.5)
ALP SERPL-CCNC: 113 U/L (ref 50–136)
ALT SERPL-CCNC: 18 U/L (ref 12–65)
ANION GAP SERPL CALC-SCNC: 6 MMOL/L (ref 7–16)
AST SERPL-CCNC: 15 U/L (ref 15–37)
BASOPHILS # BLD: 0 K/UL (ref 0–0.2)
BASOPHILS NFR BLD: 1 % (ref 0–2)
BILIRUB SERPL-MCNC: 0.3 MG/DL (ref 0.2–1.1)
BONE MARROW PREP & W,BMA: NORMAL
BUN SERPL-MCNC: 12 MG/DL (ref 8–23)
CALCIUM SERPL-MCNC: 8.9 MG/DL (ref 8.3–10.4)
CHLORIDE SERPL-SCNC: 108 MMOL/L (ref 98–107)
CO2 SERPL-SCNC: 27 MMOL/L (ref 21–32)
CREAT SERPL-MCNC: 0.7 MG/DL (ref 0.6–1)
DIFFERENTIAL METHOD BLD: ABNORMAL
EOSINOPHIL # BLD: 0 K/UL (ref 0–0.8)
EOSINOPHIL NFR BLD: 0 % (ref 0.5–7.8)
ERYTHROCYTE [DISTWIDTH] IN BLOOD BY AUTOMATED COUNT: 12.8 % (ref 11.9–14.6)
GLOBULIN SER CALC-MCNC: 3.8 G/DL (ref 2.3–3.5)
GLUCOSE SERPL-MCNC: 94 MG/DL (ref 65–100)
HCT VFR BLD AUTO: 22.2 % (ref 35.8–46.3)
HGB BLD-MCNC: 7.3 G/DL (ref 11.7–15.4)
IMM GRANULOCYTES # BLD AUTO: 0.4 K/UL (ref 0–0.5)
IMM GRANULOCYTES NFR BLD AUTO: 11 % (ref 0–5)
LYMPHOCYTES # BLD: 0.1 K/UL (ref 0.5–4.6)
LYMPHOCYTES NFR BLD: 2 % (ref 13–44)
MAGNESIUM SERPL-MCNC: 2 MG/DL (ref 1.8–2.4)
MCH RBC QN AUTO: 29.6 PG (ref 26.1–32.9)
MCHC RBC AUTO-ENTMCNC: 32.9 G/DL (ref 31.4–35)
MCV RBC AUTO: 89.9 FL (ref 79.6–97.8)
MONOCYTES # BLD: 0.4 K/UL (ref 0.1–1.3)
MONOCYTES NFR BLD: 10 % (ref 4–12)
NEUTS SEG # BLD: 2.8 K/UL (ref 1.7–8.2)
NEUTS SEG NFR BLD: 76 % (ref 43–78)
NRBC # BLD: 0.04 K/UL (ref 0–0.2)
PLATELET # BLD AUTO: 21 K/UL (ref 150–450)
PLATELET COMMENTS,PCOM: ABNORMAL
PMV BLD AUTO: 11.7 FL (ref 9.4–12.3)
POTASSIUM SERPL-SCNC: 3.9 MMOL/L (ref 3.5–5.1)
PROT SERPL-MCNC: 6.3 G/DL (ref 6.3–8.2)
RBC # BLD AUTO: 2.47 M/UL (ref 4.05–5.2)
RBC MORPH BLD: ABNORMAL
SODIUM SERPL-SCNC: 141 MMOL/L (ref 136–145)
WBC # BLD AUTO: 3.7 K/UL (ref 4.3–11.1)

## 2019-12-12 PROCEDURE — 07DR3ZX EXTRACTION OF ILIAC BONE MARROW, PERCUTANEOUS APPROACH, DIAGNOSTIC: ICD-10-PCS | Performed by: RADIOLOGY

## 2019-12-12 PROCEDURE — 99152 MOD SED SAME PHYS/QHP 5/>YRS: CPT

## 2019-12-12 PROCEDURE — 77030028872 CT BX BONE MARROW AND ASPIRATION

## 2019-12-12 PROCEDURE — 36430 TRANSFUSION BLD/BLD COMPNT: CPT

## 2019-12-12 PROCEDURE — 83735 ASSAY OF MAGNESIUM: CPT

## 2019-12-12 PROCEDURE — 88184 FLOWCYTOMETRY/ TC 1 MARKER: CPT

## 2019-12-12 PROCEDURE — 99239 HOSP IP/OBS DSCHRG MGMT >30: CPT | Performed by: INTERNAL MEDICINE

## 2019-12-12 PROCEDURE — 85025 COMPLETE CBC W/AUTO DIFF WBC: CPT

## 2019-12-12 PROCEDURE — 74011250637 HC RX REV CODE- 250/637: Performed by: NURSE PRACTITIONER

## 2019-12-12 PROCEDURE — 74011250636 HC RX REV CODE- 250/636: Performed by: RADIOLOGY

## 2019-12-12 PROCEDURE — 74011250636 HC RX REV CODE- 250/636: Performed by: INTERNAL MEDICINE

## 2019-12-12 PROCEDURE — 88313 SPECIAL STAINS GROUP 2: CPT

## 2019-12-12 PROCEDURE — 74011250637 HC RX REV CODE- 250/637: Performed by: INTERNAL MEDICINE

## 2019-12-12 PROCEDURE — 99153 MOD SED SAME PHYS/QHP EA: CPT

## 2019-12-12 PROCEDURE — 80053 COMPREHEN METABOLIC PANEL: CPT

## 2019-12-12 PROCEDURE — 88311 DECALCIFY TISSUE: CPT

## 2019-12-12 PROCEDURE — P9037 PLATE PHERES LEUKOREDU IRRAD: HCPCS

## 2019-12-12 PROCEDURE — 36591 DRAW BLOOD OFF VENOUS DEVICE: CPT

## 2019-12-12 PROCEDURE — 88185 FLOWCYTOMETRY/TC ADD-ON: CPT

## 2019-12-12 PROCEDURE — 88312 SPECIAL STAINS GROUP 1: CPT

## 2019-12-12 PROCEDURE — 86644 CMV ANTIBODY: CPT

## 2019-12-12 PROCEDURE — 88305 TISSUE EXAM BY PATHOLOGIST: CPT

## 2019-12-12 RX ORDER — VORICONAZOLE 200 MG/1
200 TABLET, FILM COATED ORAL EVERY 12 HOURS
Qty: 60 TAB | Refills: 0 | Status: SHIPPED | OUTPATIENT
Start: 2019-12-12 | End: 2020-01-01 | Stop reason: SDUPTHER

## 2019-12-12 RX ORDER — FENTANYL CITRATE 50 UG/ML
25-100 INJECTION, SOLUTION INTRAMUSCULAR; INTRAVENOUS
Status: DISCONTINUED | OUTPATIENT
Start: 2019-12-12 | End: 2019-12-12 | Stop reason: HOSPADM

## 2019-12-12 RX ORDER — ALLOPURINOL 300 MG/1
300 TABLET ORAL DAILY
Qty: 30 TAB | Refills: 0 | Status: SHIPPED | OUTPATIENT
Start: 2019-12-13 | End: 2020-01-01

## 2019-12-12 RX ORDER — AMOXICILLIN AND CLAVULANATE POTASSIUM 875; 125 MG/1; MG/1
1 TABLET, FILM COATED ORAL 2 TIMES DAILY WITH MEALS
Qty: 60 TAB | Refills: 0 | Status: SHIPPED | OUTPATIENT
Start: 2019-12-12 | End: 2019-12-23

## 2019-12-12 RX ORDER — DRONABINOL 2.5 MG/1
2.5 CAPSULE ORAL
Qty: 60 CAP | Refills: 0 | Status: SHIPPED | OUTPATIENT
Start: 2019-12-12 | End: 2019-12-19 | Stop reason: ALTCHOICE

## 2019-12-12 RX ORDER — LIDOCAINE HYDROCHLORIDE 20 MG/ML
2-20 INJECTION, SOLUTION INFILTRATION; PERINEURAL ONCE
Status: DISCONTINUED | OUTPATIENT
Start: 2019-12-12 | End: 2019-12-12 | Stop reason: HOSPADM

## 2019-12-12 RX ORDER — SODIUM CHLORIDE 9 MG/ML
250 INJECTION, SOLUTION INTRAVENOUS AS NEEDED
Status: DISCONTINUED | OUTPATIENT
Start: 2019-12-12 | End: 2019-12-12 | Stop reason: HOSPADM

## 2019-12-12 RX ORDER — MIDAZOLAM HYDROCHLORIDE 1 MG/ML
.5-2 INJECTION, SOLUTION INTRAMUSCULAR; INTRAVENOUS
Status: DISCONTINUED | OUTPATIENT
Start: 2019-12-12 | End: 2019-12-12 | Stop reason: HOSPADM

## 2019-12-12 RX ORDER — ACYCLOVIR 400 MG/1
400 TABLET ORAL 2 TIMES DAILY
Qty: 60 TAB | Refills: 0 | Status: ON HOLD | OUTPATIENT
Start: 2019-12-12 | End: 2020-01-01

## 2019-12-12 RX ADMIN — Medication 2 TABLET: at 08:11

## 2019-12-12 RX ADMIN — DIPHENHYDRAMINE HYDROCHLORIDE 25 MG: 25 CAPSULE ORAL at 13:55

## 2019-12-12 RX ADMIN — Medication 400 MG: at 08:12

## 2019-12-12 RX ADMIN — FENTANYL CITRATE 25 MCG: 50 INJECTION, SOLUTION INTRAMUSCULAR; INTRAVENOUS at 16:10

## 2019-12-12 RX ADMIN — ALLOPURINOL 300 MG: 300 TABLET ORAL at 08:11

## 2019-12-12 RX ADMIN — VORICONAZOLE 200 MG: 200 TABLET, FILM COATED ORAL at 08:11

## 2019-12-12 RX ADMIN — ACYCLOVIR 400 MG: 800 TABLET ORAL at 08:11

## 2019-12-12 RX ADMIN — AMOXICILLIN AND CLAVULANATE POTASSIUM 1 TABLET: 875; 125 TABLET, FILM COATED ORAL at 08:11

## 2019-12-12 RX ADMIN — DULOXETINE 30 MG: 30 CAPSULE, DELAYED RELEASE ORAL at 08:11

## 2019-12-12 RX ADMIN — ACETAMINOPHEN 650 MG: 325 TABLET, FILM COATED ORAL at 13:54

## 2019-12-12 RX ADMIN — MIDAZOLAM 1 MG: 1 INJECTION INTRAMUSCULAR; INTRAVENOUS at 16:07

## 2019-12-12 RX ADMIN — LORATADINE 10 MG: 10 TABLET ORAL at 08:11

## 2019-12-12 RX ADMIN — LORAZEPAM 0.5 MG: 2 INJECTION INTRAMUSCULAR; INTRAVENOUS at 08:12

## 2019-12-12 RX ADMIN — HEPARIN SODIUM (PORCINE) LOCK FLUSH IV SOLN 100 UNIT/ML 500 UNITS: 100 SOLUTION at 17:19

## 2019-12-12 RX ADMIN — MIDAZOLAM 1 MG: 1 INJECTION INTRAMUSCULAR; INTRAVENOUS at 16:10

## 2019-12-12 RX ADMIN — POTASSIUM CHLORIDE 20 MEQ: 20 TABLET, EXTENDED RELEASE ORAL at 08:12

## 2019-12-12 RX ADMIN — FENTANYL CITRATE 50 MCG: 50 INJECTION, SOLUTION INTRAMUSCULAR; INTRAVENOUS at 16:07

## 2019-12-12 RX ADMIN — AMOXICILLIN AND CLAVULANATE POTASSIUM 1 TABLET: 875; 125 TABLET, FILM COATED ORAL at 17:19

## 2019-12-12 RX ADMIN — CAMPHOR AND MENTHOL: 5; 5 LOTION TOPICAL at 09:00

## 2019-12-12 NOTE — PROGRESS NOTES
Patient has DC order for today written by ARMANDO Rivera. No social or DC needs identified during this admission. Patient to return to home after discharge today. She will have close follow up with  cancer center. Care Management Interventions PCP Verified by CM: Yes Mode of Transport at Discharge: Self Transition of Care Consult (CM Consult): Discharge Planning Discharge Durable Medical Equipment: No 
Physical Therapy Consult: No 
Occupational Therapy Consult: No 
Speech Therapy Consult: No 
Current Support Network: Own Home, Family Lives [place] Confirm Follow Up Transport: Family Plan discussed with Pt/Family/Caregiver: Yes Freedom of Choice Offered: Yes [employer] Information Provided?: No 
Discharge Location Discharge Placement: Home

## 2019-12-12 NOTE — PROCEDURES
Department of Interventional Radiology 
[phone] Interventional Radiology Brief Procedure Note Patient: Philippe Mckeon MRN: [id number]  SSN: [id number] YOB: 1945  Age: 68 y.o. Sex: female Date of Procedure: 12/12/2019 Pre-Procedure Diagnosis: AML Post-Procedure Diagnosis: SAME Procedure(s): Image Guided Biopsy Brief Description of Procedure: CT guided bone marrow bipsy Performed By: Nikhil Rojas PA-C Assistants: None Anesthesia:Moderate Sedation per Genet Rich MD 
 
Estimated Blood Loss: Less than 10ml Specimens:  Pathology Implants:  None Findings: no post biopsy bleeding Complications: None Recommendations: bedrest   
 
Follow Up: referring MD 
 
Signed By: Nikhil Rojas PA-C December 12, 2019

## 2019-12-12 NOTE — DISCHARGE INSTRUCTIONS
Patient Education        Acute Myelogenous Leukemia: Care Instructions  Your Care Instructions    Acute myelogenous leukemia (AML) is a cancer of the blood cells. In leukemia, the body makes too many of certain blood cells, especially white blood cells, and they may not work well. White blood cells are a part of the immune system, which helps protect the body from infection and disease. In AML, the white cells are not mature and are not able to help the body fight infection. The leukemia cells can crowd out the healthy blood cells and can collect in other organs, such as the spleen. The most common treatment for AML is chemotherapy. You also may get medicines to help with some of the side effects of treatment, including nausea and tiredness. When you find out that you have cancer, you may feel many emotions and may need some help coping. Seek out family, friends, and counselors for support. You also can do things at home to make yourself feel better while you go through treatment. Call the [employer] (7-361.132.7301) or visit its website at [address] for more information. Follow-up care is a key part of your treatment and safety. Be sure to make and go to all appointments, and call your doctor if you are having problems. It's also a good idea to know your test results and keep a list of the medicines you take. How can you care for yourself at home? · Take your medicines exactly as prescribed. Call your doctor if you think you are having a problem with your medicine. You may get medicine for nausea and vomiting if you have these side effects. · Eat healthy food. If you do not feel like eating, try to eat food that has protein and extra calories to keep up your strength and prevent weight loss. Drink liquid meal replacements for extra calories and protein. Try to eat your main meal early. · Get some physical activity every day, but do not get too tired.  Keep doing the hobbies you enjoy as your energy allows. · Take steps to control your stress and workload. Learn relaxation techniques. ? Share your feelings. Stress and tension affect our emotions. By expressing your feelings to others, you may be able to understand and cope with them. ? Consider joining a support group. Talking about a problem with your spouse, a good friend, or other people with similar problems is a good way to reduce tension and stress. ? Express yourself through art. Try writing, dance, art, or crafts to relieve tension. Some dance, writing, or art groups may be available just for people who have cancer. ? Be kind to your body and mind. Getting enough sleep, eating a healthy diet, and taking time to do things you enjoy can contribute to an overall feeling of balance in your life and help reduce stress. ? Get help if you need it. Discuss your concerns with your doctor or counselor. · If you are vomiting or have diarrhea:  ? Drink plenty of fluids (enough so that your urine is light yellow or clear like water) to prevent dehydration. Choose water and other caffeine-free clear liquids. If you have kidney, heart, or liver disease and have to limit fluids, talk with your doctor before you increase the amount of fluids you drink. ? When you are able to eat, try clear soups, mild foods, and liquids until all symptoms are gone for 12 to 48 hours. Other good choices include dry toast, crackers, cooked cereal, and gelatin dessert, such as Jell-O.  · If you have not already done so, prepare a list of advance directives. Advance directives are instructions to your doctor and family members about what kind of care you want if you become unable to speak or express yourself. When should you call for help? Call 911 anytime you think you may need emergency care. For example, call if:    · You passed out (lost consciousness).    Call your doctor now or seek immediate medical care if:    · You have a fever.     · You have abnormal bleeding.   · You think you have an infection.     · You have new or worse pain.     · You have new symptoms, such as a cough, belly pain, vomiting, diarrhea, or a rash.    Watch closely for changes in your health, and be sure to contact your doctor if:    · You are much more tired than usual.     · You have swollen glands in your armpits, groin, or neck.     · You do not get better as expected. Where can you learn more? Go to http://cece-ahsan.info/. Enter Q955 in the search box to learn more about \"Acute Myelogenous Leukemia: Care Instructions. \"  Current as of: December 19, 2018  Content Version: 12.2  © 7089-8093 "[employer]". Care instructions adapted under license by [employer] (which disclaims liability or warranty for this information). If you have questions about a medical condition or this instruction, always ask your healthcare professional. [address] any warranty or liability for your use of this information.

## 2019-12-13 ENCOUNTER — HOSPITAL ENCOUNTER (OUTPATIENT)
Dept: INFUSION THERAPY | Age: 74
End: 2019-12-13
Payer: MEDICARE

## 2019-12-13 ENCOUNTER — APPOINTMENT (OUTPATIENT)
Dept: CT IMAGING | Age: 74
DRG: 837 | End: 2019-12-13
Attending: NURSE PRACTITIONER
Payer: MEDICARE

## 2019-12-13 LAB
BLD PROD TYP BPU: NORMAL
BPU ID: NORMAL
STATUS OF UNIT,%ST: NORMAL
UNIT DIVISION, %UDIV: 0

## 2019-12-14 ENCOUNTER — HOSPITAL ENCOUNTER (OUTPATIENT)
Dept: INFUSION THERAPY | Age: 74
Discharge: HOME | End: 2019-12-14
Payer: MEDICARE

## 2019-12-14 ENCOUNTER — HOSPITAL ENCOUNTER (OUTPATIENT)
Dept: LAB | Age: 74
Discharge: HOME | End: 2019-12-14

## 2019-12-14 VITALS
SYSTOLIC BLOOD PRESSURE: 111 MMHG | RESPIRATION RATE: 16 BRPM | DIASTOLIC BLOOD PRESSURE: 54 MMHG | OXYGEN SATURATION: 97 % | HEART RATE: 117 BPM | TEMPERATURE: 97.9 F

## 2019-12-14 DIAGNOSIS — C92.00 ACUTE MYELOID LEUKEMIA NOT HAVING ACHIEVED REMISSION (HCC): Primary | ICD-10-CM

## 2019-12-14 LAB
ALBUMIN SERPL-MCNC: 2.9 G/DL (ref 3.2–4.6)
ALBUMIN/GLOB SERPL: 0.8 {RATIO} (ref 1.2–3.5)
ALP SERPL-CCNC: 117 U/L (ref 50–136)
ALT SERPL-CCNC: 24 U/L (ref 12–65)
ANION GAP SERPL CALC-SCNC: 8 MMOL/L (ref 7–16)
AST SERPL-CCNC: 18 U/L (ref 15–37)
BASOPHILS # BLD: 0 K/UL (ref 0–0.2)
BASOPHILS NFR BLD MANUAL: 1 % (ref 0–2)
BILIRUB SERPL-MCNC: 0.2 MG/DL (ref 0.2–1.1)
BUN SERPL-MCNC: 13 MG/DL (ref 8–23)
CALCIUM SERPL-MCNC: 9 MG/DL (ref 8.3–10.4)
CHLORIDE SERPL-SCNC: 110 MMOL/L (ref 98–107)
CO2 SERPL-SCNC: 23 MMOL/L (ref 21–32)
CREAT SERPL-MCNC: 0.93 MG/DL (ref 0.6–1)
DIFFERENTIAL METHOD BLD: ABNORMAL
ERYTHROCYTE [DISTWIDTH] IN BLOOD BY AUTOMATED COUNT: 13.2 % (ref 11.9–14.6)
GLOBULIN SER CALC-MCNC: 3.6 G/DL (ref 2.3–3.5)
GLUCOSE SERPL-MCNC: 183 MG/DL (ref 65–100)
HCT VFR BLD AUTO: 22.8 % (ref 35.8–46.3)
HGB BLD-MCNC: 7.4 G/DL (ref 11.7–15.4)
LYMPHOCYTES # BLD: 0.1 K/UL (ref 0.5–4.6)
LYMPHOCYTES NFR BLD MANUAL: 4 % (ref 16–44)
MAGNESIUM SERPL-MCNC: 1.8 MG/DL (ref 1.8–2.4)
MCH RBC QN AUTO: 29.4 PG (ref 26.1–32.9)
MCHC RBC AUTO-ENTMCNC: 32.5 G/DL (ref 31.4–35)
MCV RBC AUTO: 90.5 FL (ref 79.6–97.8)
MONOCYTES # BLD: 0.3 K/UL (ref 0.1–1.3)
MONOCYTES NFR BLD MANUAL: 12 % (ref 3–9)
MYELOCYTES NFR BLD MANUAL: 4 %
NEUTS BAND NFR BLD MANUAL: 8 % (ref 0–6)
NEUTS SEG # BLD: 1.9 K/UL (ref 1.7–8.2)
NEUTS SEG NFR BLD MANUAL: 69 % (ref 47–75)
NRBC # BLD: 0.09 K/UL (ref 0–0.2)
PLATELET # BLD AUTO: 60 K/UL (ref 150–450)
PLATELET COMMENTS,PCOM: ABNORMAL
PMV BLD AUTO: 10.8 FL (ref 9.4–12.3)
POTASSIUM SERPL-SCNC: 3.3 MMOL/L (ref 3.5–5.1)
PROMYELOCYTES NFR BLD MANUAL: 2 %
PROT SERPL-MCNC: 6.5 G/DL (ref 6.3–8.2)
RBC # BLD AUTO: 2.52 M/UL (ref 4.05–5.25)
RBC MORPH BLD: ABNORMAL
RBC MORPH BLD: ABNORMAL
SODIUM SERPL-SCNC: 141 MMOL/L (ref 136–145)
WBC # BLD AUTO: 2.3 K/UL (ref 4.3–11.1)
WBC MORPH BLD: SLIGHT

## 2019-12-14 PROCEDURE — 80053 COMPREHEN METABOLIC PANEL: CPT

## 2019-12-14 PROCEDURE — 83735 ASSAY OF MAGNESIUM: CPT

## 2019-12-14 PROCEDURE — 99211 OFF/OP EST MAY X REQ PHY/QHP: CPT

## 2019-12-14 PROCEDURE — 74011250636 HC RX REV CODE- 250/636: Performed by: NURSE PRACTITIONER

## 2019-12-14 PROCEDURE — 85025 COMPLETE CBC W/AUTO DIFF WBC: CPT

## 2019-12-14 RX ORDER — SODIUM CHLORIDE 0.9 % (FLUSH) 0.9 %
10 SYRINGE (ML) INJECTION EVERY 8 HOURS
Status: DISCONTINUED | OUTPATIENT
Start: 2019-12-14 | End: 2019-12-18 | Stop reason: HOSPADM

## 2019-12-14 RX ADMIN — SODIUM CHLORIDE 500 ML: 900 INJECTION, SOLUTION INTRAVENOUS at 08:00

## 2019-12-14 RX ADMIN — Medication 10 ML: at 08:40

## 2019-12-14 NOTE — PROGRESS NOTES
Arrived to the [facility]. Hydration (1/2 Liter NS) completed. Patient tolerated well. Any issues or concerns during appointment: none Patient aware of next infusion appointment on 12/16/2019. Discharged in wheelchair to home with spouse.

## 2019-12-16 ENCOUNTER — HOSPITAL ENCOUNTER (OUTPATIENT)
Dept: INFUSION THERAPY | Age: 74
Discharge: HOME | End: 2019-12-16
Payer: MEDICARE

## 2019-12-16 VITALS
BODY MASS INDEX: 28.96 KG/M2 | DIASTOLIC BLOOD PRESSURE: 53 MMHG | WEIGHT: 179.4 LBS | TEMPERATURE: 98.8 F | RESPIRATION RATE: 16 BRPM | SYSTOLIC BLOOD PRESSURE: 127 MMHG | HEART RATE: 122 BPM | OXYGEN SATURATION: 97 %

## 2019-12-16 DIAGNOSIS — C92.00 ACUTE MYELOID LEUKEMIA NOT HAVING ACHIEVED REMISSION (HCC): Primary | ICD-10-CM

## 2019-12-16 LAB
ALBUMIN SERPL-MCNC: 3.1 G/DL (ref 3.2–4.6)
ALBUMIN/GLOB SERPL: 0.9 {RATIO} (ref 1.2–3.5)
ALP SERPL-CCNC: 122 U/L (ref 50–136)
ALT SERPL-CCNC: 22 U/L (ref 12–65)
ANION GAP SERPL CALC-SCNC: 8 MMOL/L (ref 7–16)
AST SERPL-CCNC: 17 U/L (ref 15–37)
BASOPHILS # BLD: 0 K/UL (ref 0–0.2)
BASOPHILS NFR BLD: 0 % (ref 0–2)
BILIRUB SERPL-MCNC: 0.3 MG/DL (ref 0.2–1.1)
BUN SERPL-MCNC: 14 MG/DL (ref 8–23)
CALCIUM SERPL-MCNC: 9.2 MG/DL (ref 8.3–10.4)
CHLORIDE SERPL-SCNC: 109 MMOL/L (ref 98–107)
CO2 SERPL-SCNC: 23 MMOL/L (ref 21–32)
CREAT SERPL-MCNC: 0.81 MG/DL (ref 0.6–1)
DIFFERENTIAL METHOD BLD: ABNORMAL
EOSINOPHIL # BLD: 0 K/UL (ref 0–0.8)
EOSINOPHIL NFR BLD: 0 % (ref 0.5–7.8)
ERYTHROCYTE [DISTWIDTH] IN BLOOD BY AUTOMATED COUNT: 13.5 % (ref 11.9–14.6)
GLOBULIN SER CALC-MCNC: 3.4 G/DL (ref 2.3–3.5)
GLUCOSE SERPL-MCNC: 121 MG/DL (ref 65–100)
HCT VFR BLD AUTO: 21.6 % (ref 35.8–46.3)
HGB BLD-MCNC: 7.1 G/DL (ref 11.7–15.4)
IMM GRANULOCYTES # BLD AUTO: 0.3 K/UL (ref 0–0.5)
IMM GRANULOCYTES NFR BLD AUTO: 10 % (ref 0–5)
LYMPHOCYTES # BLD: 0.2 K/UL (ref 0.5–4.6)
LYMPHOCYTES NFR BLD: 6 % (ref 13–44)
MAGNESIUM SERPL-MCNC: 1.8 MG/DL (ref 1.8–2.4)
MCH RBC QN AUTO: 29.8 PG (ref 26.1–32.9)
MCHC RBC AUTO-ENTMCNC: 32.9 G/DL (ref 31.4–35)
MCV RBC AUTO: 90.8 FL (ref 79.6–97.8)
MONOCYTES # BLD: 0.8 K/UL (ref 0.1–1.3)
MONOCYTES NFR BLD: 28 % (ref 4–12)
NEUTS SEG # BLD: 1.5 K/UL (ref 1.7–8.2)
NEUTS SEG NFR BLD: 56 % (ref 43–78)
NRBC # BLD: 0.17 K/UL (ref 0–0.2)
PLATELET # BLD AUTO: 70 K/UL (ref 150–450)
PLATELET COMMENTS,PCOM: ABNORMAL
PMV BLD AUTO: 10.1 FL (ref 9.4–12.3)
POTASSIUM SERPL-SCNC: 4 MMOL/L (ref 3.5–5.1)
PROT SERPL-MCNC: 6.5 G/DL (ref 6.3–8.2)
RBC # BLD AUTO: 2.38 M/UL (ref 4.05–5.25)
RBC MORPH BLD: ABNORMAL
SODIUM SERPL-SCNC: 140 MMOL/L (ref 136–145)
WBC # BLD AUTO: 2.8 K/UL (ref 4.3–11.1)
WBC MORPH BLD: ABNORMAL

## 2019-12-16 PROCEDURE — 36591 DRAW BLOOD OFF VENOUS DEVICE: CPT

## 2019-12-16 PROCEDURE — 85025 COMPLETE CBC W/AUTO DIFF WBC: CPT

## 2019-12-16 PROCEDURE — 86644 CMV ANTIBODY: CPT

## 2019-12-16 PROCEDURE — 83735 ASSAY OF MAGNESIUM: CPT

## 2019-12-16 PROCEDURE — 86900 BLOOD TYPING SEROLOGIC ABO: CPT

## 2019-12-16 PROCEDURE — 80053 COMPREHEN METABOLIC PANEL: CPT

## 2019-12-16 PROCEDURE — 86923 COMPATIBILITY TEST ELECTRIC: CPT

## 2019-12-16 RX ORDER — SODIUM CHLORIDE 0.9 % (FLUSH) 0.9 %
10 SYRINGE (ML) INJECTION AS NEEDED
Status: DISCONTINUED | OUTPATIENT
Start: 2019-12-16 | End: 2019-12-20 | Stop reason: HOSPADM

## 2019-12-16 RX ADMIN — Medication 10 ML: at 11:39

## 2019-12-16 NOTE — PROGRESS NOTES
Pt arrived ambulatory, labs drawn and reviewed, no replacements needed per order, type and cross drawn and sent in anticipation of PRBC needed on 19 at 0830 appointment (type and cross will   at noon), pt instructed not to remove blood bank armband, verbalized understanding, discharged home ambulatory

## 2019-12-17 LAB
FLOW CYTOMETRY, FBTC1: NORMAL
SPECIMEN SOURCE: NORMAL
TEST ORDERED:: NORMAL

## 2019-12-19 ENCOUNTER — HOSPITAL ENCOUNTER (OUTPATIENT)
Dept: LAB | Age: 74
Discharge: HOME | End: 2019-12-19
Payer: MEDICARE

## 2019-12-19 ENCOUNTER — HOSPITAL ENCOUNTER (OUTPATIENT)
Dept: INFUSION THERAPY | Age: 74
Discharge: HOME | End: 2019-12-19
Payer: MEDICARE

## 2019-12-19 ENCOUNTER — PATIENT OUTREACH (OUTPATIENT)
Dept: CASE MANAGEMENT | Age: 74
End: 2019-12-19

## 2019-12-19 VITALS
OXYGEN SATURATION: 99 % | HEART RATE: 102 BPM | TEMPERATURE: 97.9 F | DIASTOLIC BLOOD PRESSURE: 68 MMHG | SYSTOLIC BLOOD PRESSURE: 133 MMHG | RESPIRATION RATE: 16 BRPM

## 2019-12-19 DIAGNOSIS — C92.00 ACUTE MYELOID LEUKEMIA NOT HAVING ACHIEVED REMISSION (HCC): ICD-10-CM

## 2019-12-19 DIAGNOSIS — C92.00 ACUTE MYELOID LEUKEMIA NOT HAVING ACHIEVED REMISSION (HCC): Primary | ICD-10-CM

## 2019-12-19 LAB
ALBUMIN SERPL-MCNC: 3.1 G/DL (ref 3.2–4.6)
ALBUMIN/GLOB SERPL: 0.9 {RATIO} (ref 1.2–3.5)
ALP SERPL-CCNC: 120 U/L (ref 50–136)
ALT SERPL-CCNC: 18 U/L (ref 12–65)
ANION GAP SERPL CALC-SCNC: 9 MMOL/L (ref 7–16)
AST SERPL-CCNC: 18 U/L (ref 15–37)
BASOPHILS # BLD: 0 K/UL (ref 0–0.2)
BASOPHILS NFR BLD: 1 % (ref 0–2)
BILIRUB SERPL-MCNC: 0.3 MG/DL (ref 0.2–1.1)
BUN SERPL-MCNC: 14 MG/DL (ref 8–23)
CALCIUM SERPL-MCNC: 9.2 MG/DL (ref 8.3–10.4)
CHLORIDE SERPL-SCNC: 111 MMOL/L (ref 98–107)
CO2 SERPL-SCNC: 23 MMOL/L (ref 21–32)
CREAT SERPL-MCNC: 1 MG/DL (ref 0.6–1)
DIFFERENTIAL METHOD BLD: ABNORMAL
EOSINOPHIL # BLD: 0 K/UL (ref 0–0.8)
EOSINOPHIL NFR BLD: 0 % (ref 0.5–7.8)
ERYTHROCYTE [DISTWIDTH] IN BLOOD BY AUTOMATED COUNT: 13.4 % (ref 11.9–14.6)
GLOBULIN SER CALC-MCNC: 3.4 G/DL (ref 2.3–3.5)
GLUCOSE SERPL-MCNC: 139 MG/DL (ref 65–100)
HCT VFR BLD AUTO: 21.7 % (ref 35.8–46.3)
HGB BLD-MCNC: 7.1 G/DL (ref 11.7–15.4)
IMM GRANULOCYTES # BLD AUTO: 0.1 K/UL (ref 0–0.5)
IMM GRANULOCYTES NFR BLD AUTO: 6 % (ref 0–5)
LYMPHOCYTES # BLD: 0.1 K/UL (ref 0.5–4.6)
LYMPHOCYTES NFR BLD: 6 % (ref 13–44)
MAGNESIUM SERPL-MCNC: 1.9 MG/DL (ref 1.8–2.4)
MCH RBC QN AUTO: 30.5 PG (ref 26.1–32.9)
MCHC RBC AUTO-ENTMCNC: 32.7 G/DL (ref 31.4–35)
MCV RBC AUTO: 93.1 FL (ref 79.6–97.8)
MONOCYTES # BLD: 0.7 K/UL (ref 0.1–1.3)
MONOCYTES NFR BLD: 29 % (ref 4–12)
NEUTS SEG # BLD: 1.5 K/UL (ref 1.7–8.2)
NEUTS SEG NFR BLD: 58 % (ref 43–78)
NRBC # BLD: 0.18 K/UL (ref 0–0.2)
PLATELET # BLD AUTO: 130 K/UL (ref 150–450)
PLATELET COMMENTS,PCOM: ABNORMAL
PMV BLD AUTO: 10.7 FL (ref 9.4–12.3)
POTASSIUM SERPL-SCNC: 3.6 MMOL/L (ref 3.5–5.1)
PROT SERPL-MCNC: 6.5 G/DL (ref 6.3–8.2)
RBC # BLD AUTO: 2.33 M/UL (ref 4.05–5.25)
RBC MORPH BLD: ABNORMAL
RBC MORPH BLD: ABNORMAL
SODIUM SERPL-SCNC: 143 MMOL/L (ref 136–145)
WBC # BLD AUTO: 2.4 K/UL (ref 4.3–11.1)
WBC MORPH BLD: ABNORMAL

## 2019-12-19 PROCEDURE — P9040 RBC LEUKOREDUCED IRRADIATED: HCPCS

## 2019-12-19 PROCEDURE — 74011250636 HC RX REV CODE- 250/636: Performed by: INTERNAL MEDICINE

## 2019-12-19 PROCEDURE — 74011250637 HC RX REV CODE- 250/637: Performed by: INTERNAL MEDICINE

## 2019-12-19 PROCEDURE — 96360 HYDRATION IV INFUSION INIT: CPT

## 2019-12-19 PROCEDURE — 77030018667 ADMN ST IV BLD FENW -A

## 2019-12-19 PROCEDURE — 85025 COMPLETE CBC W/AUTO DIFF WBC: CPT

## 2019-12-19 PROCEDURE — 80053 COMPREHEN METABOLIC PANEL: CPT

## 2019-12-19 PROCEDURE — 74011250636 HC RX REV CODE- 250/636: Performed by: NURSE PRACTITIONER

## 2019-12-19 PROCEDURE — 83735 ASSAY OF MAGNESIUM: CPT

## 2019-12-19 PROCEDURE — 36430 TRANSFUSION BLD/BLD COMPNT: CPT

## 2019-12-19 RX ORDER — ACETAMINOPHEN 325 MG/1
650 TABLET ORAL ONCE
Status: COMPLETED | OUTPATIENT
Start: 2019-12-19 | End: 2019-12-19

## 2019-12-19 RX ORDER — SODIUM CHLORIDE 9 MG/ML
250 INJECTION, SOLUTION INTRAVENOUS AS NEEDED
Status: DISCONTINUED | OUTPATIENT
Start: 2019-12-19 | End: 2019-12-23 | Stop reason: HOSPADM

## 2019-12-19 RX ORDER — DIPHENHYDRAMINE HCL 25 MG
25 CAPSULE ORAL
Status: DISPENSED | OUTPATIENT
Start: 2019-12-19 | End: 2019-12-19

## 2019-12-19 RX ADMIN — SODIUM CHLORIDE 250 ML: 900 INJECTION, SOLUTION INTRAVENOUS at 10:40

## 2019-12-19 RX ADMIN — ACETAMINOPHEN 650 MG: 325 TABLET, FILM COATED ORAL at 09:39

## 2019-12-19 RX ADMIN — POTASSIUM CHLORIDE: 2 INJECTION, SOLUTION, CONCENTRATE INTRAVENOUS at 09:35

## 2019-12-19 RX ADMIN — DIPHENHYDRAMINE HYDROCHLORIDE 25 MG: 25 CAPSULE ORAL at 09:39

## 2019-12-19 NOTE — PROGRESS NOTES
12/19/19:  Patient in for follow-up with NP. Patient reports fatigue (\"so tired\") and nausea. Zofran not relieving nausea at this point. No emesis noted. Patient to try holding marinol as it was initiated in the hospital and may be contributing to overwhelming fatigue. Patient with in-grown toe nail that is improving. Patient instructed to continue with Epsom salt soaks, neosporin. Patient to re-start levaquin. Potassium decreased to daily dosing. She will receive 10meq in IVF today. Patient to return on Monday to further discuss bone marrow biopsy results with Dr. Patrice Tate. Patient wants to wait on blood until next week to see if counts go up.

## 2019-12-19 NOTE — PROGRESS NOTES
Arrived to the [facility]. Assessment completed and labs reviewed. Hydration and 1 unit PRBCs completed. Patient tolerated well. Any issues or concerns during appointment: No. 
Patient aware of next infusion appointment on 12/23/19 at 1030am. 
Discharged via w/c accompanied by spouse.

## 2019-12-23 ENCOUNTER — PATIENT OUTREACH (OUTPATIENT)
Dept: CASE MANAGEMENT | Age: 74
End: 2019-12-23

## 2019-12-23 ENCOUNTER — HOSPITAL ENCOUNTER (OUTPATIENT)
Dept: INFUSION THERAPY | Age: 74
Discharge: HOME | End: 2019-12-23
Payer: MEDICARE

## 2019-12-23 ENCOUNTER — HOSPITAL ENCOUNTER (OUTPATIENT)
Dept: LAB | Age: 74
Discharge: HOME | End: 2019-12-23
Payer: MEDICARE

## 2019-12-23 ENCOUNTER — APPOINTMENT (OUTPATIENT)
Dept: INFUSION THERAPY | Age: 74
End: 2019-12-23
Payer: MEDICARE

## 2019-12-23 DIAGNOSIS — C92.00 ACUTE MYELOID LEUKEMIA NOT HAVING ACHIEVED REMISSION (HCC): Primary | ICD-10-CM

## 2019-12-23 DIAGNOSIS — C92.00 ACUTE MYELOID LEUKEMIA NOT HAVING ACHIEVED REMISSION (HCC): ICD-10-CM

## 2019-12-23 LAB
ALBUMIN SERPL-MCNC: 3 G/DL (ref 3.2–4.6)
ALBUMIN/GLOB SERPL: 0.9 {RATIO} (ref 1.2–3.5)
ALP SERPL-CCNC: 113 U/L (ref 50–136)
ALT SERPL-CCNC: 16 U/L (ref 12–65)
ANION GAP SERPL CALC-SCNC: 4 MMOL/L (ref 7–16)
AST SERPL-CCNC: 16 U/L (ref 15–37)
BASOPHILS # BLD: 0 K/UL (ref 0–0.2)
BASOPHILS NFR BLD: 1 % (ref 0–2)
BILIRUB SERPL-MCNC: 0.3 MG/DL (ref 0.2–1.1)
BUN SERPL-MCNC: 9 MG/DL (ref 8–23)
CALCIUM SERPL-MCNC: 9.4 MG/DL (ref 8.3–10.4)
CHLORIDE SERPL-SCNC: 111 MMOL/L (ref 98–107)
CO2 SERPL-SCNC: 26 MMOL/L (ref 21–32)
CREAT SERPL-MCNC: 0.64 MG/DL (ref 0.6–1)
DIFFERENTIAL METHOD BLD: ABNORMAL
EOSINOPHIL # BLD: 0 K/UL (ref 0–0.8)
EOSINOPHIL NFR BLD: 1 % (ref 0.5–7.8)
ERYTHROCYTE [DISTWIDTH] IN BLOOD BY AUTOMATED COUNT: 18.4 % (ref 11.9–14.6)
GLOBULIN SER CALC-MCNC: 3.4 G/DL (ref 2.3–3.5)
GLUCOSE SERPL-MCNC: 137 MG/DL (ref 65–100)
HCT VFR BLD AUTO: 26.1 % (ref 35.8–46.3)
HGB BLD-MCNC: 8.4 G/DL (ref 11.7–15.4)
IMM GRANULOCYTES # BLD AUTO: 0 K/UL (ref 0–0.5)
IMM GRANULOCYTES NFR BLD AUTO: 0 % (ref 0–5)
LYMPHOCYTES # BLD: 0.1 K/UL (ref 0.5–4.6)
LYMPHOCYTES NFR BLD: 3 % (ref 13–44)
MAGNESIUM SERPL-MCNC: 1.7 MG/DL (ref 1.8–2.4)
MCH RBC QN AUTO: 30.9 PG (ref 26.1–32.9)
MCHC RBC AUTO-ENTMCNC: 32.2 G/DL (ref 31.4–35)
MCV RBC AUTO: 96 FL (ref 79.6–97.8)
MONOCYTES # BLD: 1 K/UL (ref 0.1–1.3)
MONOCYTES NFR BLD: 41 % (ref 4–12)
NEUTS SEG # BLD: 1.3 K/UL (ref 1.7–8.2)
NEUTS SEG NFR BLD: 53 % (ref 43–78)
NRBC # BLD: 0.02 K/UL (ref 0–0.2)
PLATELET # BLD AUTO: 149 K/UL (ref 150–450)
PMV BLD AUTO: 9.5 FL (ref 9.4–12.3)
POTASSIUM SERPL-SCNC: 3.6 MMOL/L (ref 3.5–5.1)
PROT SERPL-MCNC: 6.4 G/DL (ref 6.3–8.2)
RBC # BLD AUTO: 2.72 M/UL (ref 4.05–5.25)
SODIUM SERPL-SCNC: 141 MMOL/L (ref 136–145)
WBC # BLD AUTO: 2.5 K/UL (ref 4.3–11.1)

## 2019-12-23 PROCEDURE — 86923 COMPATIBILITY TEST ELECTRIC: CPT

## 2019-12-23 PROCEDURE — 90471 IMMUNIZATION ADMIN: CPT

## 2019-12-23 PROCEDURE — 86644 CMV ANTIBODY: CPT

## 2019-12-23 PROCEDURE — 83735 ASSAY OF MAGNESIUM: CPT

## 2019-12-23 PROCEDURE — 85025 COMPLETE CBC W/AUTO DIFF WBC: CPT

## 2019-12-23 PROCEDURE — 80053 COMPREHEN METABOLIC PANEL: CPT

## 2019-12-23 PROCEDURE — 90686 IIV4 VACC NO PRSV 0.5 ML IM: CPT | Performed by: INTERNAL MEDICINE

## 2019-12-23 PROCEDURE — 74011250636 HC RX REV CODE- 250/636: Performed by: INTERNAL MEDICINE

## 2019-12-23 PROCEDURE — 86900 BLOOD TYPING SEROLOGIC ABO: CPT

## 2019-12-23 RX ORDER — SODIUM CHLORIDE 0.9 % (FLUSH) 0.9 %
10 SYRINGE (ML) INJECTION AS NEEDED
Status: DISCONTINUED | OUTPATIENT
Start: 2019-12-23 | End: 2019-12-27 | Stop reason: HOSPADM

## 2019-12-23 RX ADMIN — INFLUENZA VIRUS VACCINE 0.5 ML: 15; 15; 15; 15 SUSPENSION INTRAMUSCULAR at 11:45

## 2019-12-23 RX ADMIN — Medication 10 ML: at 11:40

## 2019-12-23 NOTE — PROGRESS NOTES
12/23/19:  Patient in for follow-up with Dr. Shoshana Mcrae. Dr. Sharmila King discussed bone marrow biopsy results and plan to possibly switch treatment regimens. Patient doesn't need any replacements today but will go for a flu shot. Patient to return in 1 week to discuss treatment plan. Possibly admit for HIDAC for 5 days. Patient going to think about treatment options as she doesn't want to be inpatient for an extended length of time again.

## 2019-12-23 NOTE — PROGRESS NOTES
Arrived to the [facility]. Flu shot completed. Patient tolerated well. Any issues or concerns during appointment: no. 
Patient is aware of next infusion appointment 12/26/19 at 10:00 am. 
Discharged via w/c with spouse.

## 2019-12-26 ENCOUNTER — HOSPITAL ENCOUNTER (OUTPATIENT)
Dept: INFUSION THERAPY | Age: 74
Discharge: HOME | End: 2019-12-26
Payer: MEDICARE

## 2019-12-26 VITALS
SYSTOLIC BLOOD PRESSURE: 140 MMHG | DIASTOLIC BLOOD PRESSURE: 80 MMHG | OXYGEN SATURATION: 98 % | HEART RATE: 104 BPM | TEMPERATURE: 98.1 F | RESPIRATION RATE: 18 BRPM | BODY MASS INDEX: 29.24 KG/M2 | WEIGHT: 178.4 LBS

## 2019-12-26 DIAGNOSIS — C92.00 ACUTE MYELOID LEUKEMIA NOT HAVING ACHIEVED REMISSION (HCC): Primary | ICD-10-CM

## 2019-12-26 LAB
ALBUMIN SERPL-MCNC: 3.3 G/DL (ref 3.2–4.6)
ALBUMIN/GLOB SERPL: 0.9 {RATIO} (ref 1.2–3.5)
ALP SERPL-CCNC: 115 U/L (ref 50–136)
ALT SERPL-CCNC: 22 U/L (ref 12–65)
ANION GAP SERPL CALC-SCNC: 7 MMOL/L (ref 7–16)
AST SERPL-CCNC: 25 U/L (ref 15–37)
BASOPHILS # BLD: 0 K/UL (ref 0–0.2)
BASOPHILS NFR BLD: 1 % (ref 0–2)
BILIRUB SERPL-MCNC: 0.4 MG/DL (ref 0.2–1.1)
BUN SERPL-MCNC: 10 MG/DL (ref 8–23)
CALCIUM SERPL-MCNC: 9.7 MG/DL (ref 8.3–10.4)
CHLORIDE SERPL-SCNC: 106 MMOL/L (ref 98–107)
CO2 SERPL-SCNC: 26 MMOL/L (ref 21–32)
CREAT SERPL-MCNC: 0.59 MG/DL (ref 0.6–1)
DIFFERENTIAL METHOD BLD: ABNORMAL
EOSINOPHIL # BLD: 0.1 K/UL (ref 0–0.8)
EOSINOPHIL NFR BLD: 2 % (ref 0.5–7.8)
ERYTHROCYTE [DISTWIDTH] IN BLOOD BY AUTOMATED COUNT: 19.9 % (ref 11.9–14.6)
GLOBULIN SER CALC-MCNC: 3.7 G/DL (ref 2.3–3.5)
GLUCOSE SERPL-MCNC: 99 MG/DL (ref 65–100)
HCT VFR BLD AUTO: 28.1 % (ref 35.8–46.3)
HGB BLD-MCNC: 9 G/DL (ref 11.7–15.4)
IMM GRANULOCYTES # BLD AUTO: 0 K/UL (ref 0–0.5)
IMM GRANULOCYTES NFR BLD AUTO: 1 % (ref 0–5)
LYMPHOCYTES # BLD: 0.2 K/UL (ref 0.5–4.6)
LYMPHOCYTES NFR BLD: 6 % (ref 13–44)
MAGNESIUM SERPL-MCNC: 2 MG/DL (ref 1.8–2.4)
MCH RBC QN AUTO: 31.3 PG (ref 26.1–32.9)
MCHC RBC AUTO-ENTMCNC: 32 G/DL (ref 31.4–35)
MCV RBC AUTO: 97.6 FL (ref 79.6–97.8)
MONOCYTES # BLD: 1.9 K/UL (ref 0.1–1.3)
MONOCYTES NFR BLD: 47 % (ref 4–12)
NEUTS SEG # BLD: 1.8 K/UL (ref 1.7–8.2)
NEUTS SEG NFR BLD: 44 % (ref 43–78)
NRBC # BLD: 0 K/UL (ref 0–0.2)
PLATELET # BLD AUTO: 189 K/UL (ref 150–450)
PMV BLD AUTO: 9.6 FL (ref 9.4–12.3)
POTASSIUM SERPL-SCNC: 4 MMOL/L (ref 3.5–5.1)
PROT SERPL-MCNC: 7 G/DL (ref 6.3–8.2)
RBC # BLD AUTO: 2.88 M/UL (ref 4.05–5.25)
SODIUM SERPL-SCNC: 139 MMOL/L (ref 136–145)
WBC # BLD AUTO: 4.1 K/UL (ref 4.3–11.1)

## 2019-12-26 PROCEDURE — 83735 ASSAY OF MAGNESIUM: CPT

## 2019-12-26 PROCEDURE — 36591 DRAW BLOOD OFF VENOUS DEVICE: CPT

## 2019-12-26 PROCEDURE — 80053 COMPREHEN METABOLIC PANEL: CPT

## 2019-12-26 PROCEDURE — 85025 COMPLETE CBC W/AUTO DIFF WBC: CPT

## 2019-12-26 RX ORDER — SODIUM CHLORIDE 0.9 % (FLUSH) 0.9 %
10 SYRINGE (ML) INJECTION AS NEEDED
Status: DISCONTINUED | OUTPATIENT
Start: 2019-12-26 | End: 2019-12-30 | Stop reason: HOSPADM

## 2019-12-26 RX ADMIN — Medication 10 ML: at 10:20

## 2019-12-26 RX ADMIN — Medication 10 ML: at 11:16

## 2019-12-26 NOTE — PROGRESS NOTES
[facility] Labs drawn - no replacements needed. Patient tolerated well. Any issues or concerns during appointment: no. 
Patient is aware of next lab appointment/OV on 12/30/19 at 2:20 pm.  
Discharged ambulatory with spouse.

## 2019-12-30 ENCOUNTER — HOSPITAL ENCOUNTER (INPATIENT)
Age: 74
LOS: 18 days | Discharge: HOME | DRG: 837 | End: 2020-01-17
Attending: INTERNAL MEDICINE | Admitting: INTERNAL MEDICINE
Payer: MEDICARE

## 2019-12-30 ENCOUNTER — HOSPITAL ENCOUNTER (OUTPATIENT)
Dept: LAB | Age: 74
Discharge: HOME | DRG: 837 | End: 2019-12-30
Payer: MEDICARE

## 2019-12-30 ENCOUNTER — PATIENT OUTREACH (OUTPATIENT)
Dept: CASE MANAGEMENT | Age: 74
End: 2019-12-30

## 2019-12-30 DIAGNOSIS — D84.81 IMMUNOCOMPROMISED STATUS ASSOCIATED WITH INFECTION (HCC): ICD-10-CM

## 2019-12-30 DIAGNOSIS — C92.00 ACUTE MYELOID LEUKEMIA NOT HAVING ACHIEVED REMISSION (HCC): ICD-10-CM

## 2019-12-30 DIAGNOSIS — D70.9 FEBRILE NEUTROPENIA (HCC): ICD-10-CM

## 2019-12-30 DIAGNOSIS — B99.9 IMMUNOCOMPROMISED STATUS ASSOCIATED WITH INFECTION (HCC): ICD-10-CM

## 2019-12-30 DIAGNOSIS — L03.039 INFECTED NAIL BED OF TOE, UNSPECIFIED LATERALITY: ICD-10-CM

## 2019-12-30 DIAGNOSIS — Z51.11 ADMISSION FOR ANTINEOPLASTIC CHEMOTHERAPY: Primary | ICD-10-CM

## 2019-12-30 DIAGNOSIS — R50.81 FEBRILE NEUTROPENIA (HCC): ICD-10-CM

## 2019-12-30 DIAGNOSIS — L03.221 CELLULITIS OF NECK: ICD-10-CM

## 2019-12-30 DIAGNOSIS — D61.818 PANCYTOPENIA (HCC): ICD-10-CM

## 2019-12-30 LAB
ALBUMIN SERPL-MCNC: 3.2 G/DL (ref 3.2–4.6)
ALBUMIN/GLOB SERPL: 0.9 {RATIO} (ref 1.2–3.5)
ALP SERPL-CCNC: 112 U/L (ref 50–136)
ALT SERPL-CCNC: 31 U/L (ref 12–65)
ANION GAP SERPL CALC-SCNC: 8 MMOL/L (ref 7–16)
AST SERPL-CCNC: 25 U/L (ref 15–37)
BASOPHILS # BLD: 0 K/UL (ref 0–0.2)
BASOPHILS NFR BLD: 1 % (ref 0–2)
BILIRUB SERPL-MCNC: 0.3 MG/DL (ref 0.2–1.1)
BUN SERPL-MCNC: 13 MG/DL (ref 8–23)
CALCIUM SERPL-MCNC: 9.5 MG/DL (ref 8.3–10.4)
CHLORIDE SERPL-SCNC: 107 MMOL/L (ref 98–107)
CO2 SERPL-SCNC: 24 MMOL/L (ref 21–32)
CREAT SERPL-MCNC: 0.77 MG/DL (ref 0.6–1)
DIFFERENTIAL METHOD BLD: ABNORMAL
EOSINOPHIL # BLD: 0.3 K/UL (ref 0–0.8)
EOSINOPHIL NFR BLD: 6 % (ref 0.5–7.8)
ERYTHROCYTE [DISTWIDTH] IN BLOOD BY AUTOMATED COUNT: 21.2 % (ref 11.9–14.6)
GLOBULIN SER CALC-MCNC: 3.6 G/DL (ref 2.3–3.5)
GLUCOSE SERPL-MCNC: 110 MG/DL (ref 65–100)
HCT VFR BLD AUTO: 27.7 % (ref 35.8–46.3)
HGB BLD-MCNC: 8.9 G/DL (ref 11.7–15.4)
IMM GRANULOCYTES # BLD AUTO: 0.1 K/UL (ref 0–0.5)
IMM GRANULOCYTES NFR BLD AUTO: 1 % (ref 0–5)
LYMPHOCYTES # BLD: 0.5 K/UL (ref 0.5–4.6)
LYMPHOCYTES NFR BLD: 11 % (ref 13–44)
MAGNESIUM SERPL-MCNC: 1.7 MG/DL (ref 1.8–2.4)
MCH RBC QN AUTO: 31.9 PG (ref 26.1–32.9)
MCHC RBC AUTO-ENTMCNC: 32.1 G/DL (ref 31.4–35)
MCV RBC AUTO: 99.3 FL (ref 79.6–97.8)
MONOCYTES # BLD: 2 K/UL (ref 0.1–1.3)
MONOCYTES NFR BLD: 45 % (ref 4–12)
NEUTS SEG # BLD: 1.6 K/UL (ref 1.7–8.2)
NEUTS SEG NFR BLD: 36 % (ref 43–78)
NRBC # BLD: 0 K/UL (ref 0–0.2)
PLATELET # BLD AUTO: 208 K/UL (ref 150–450)
PMV BLD AUTO: 9.4 FL (ref 9.4–12.3)
POTASSIUM SERPL-SCNC: 4.3 MMOL/L (ref 3.5–5.1)
PROT SERPL-MCNC: 6.8 G/DL (ref 6.3–8.2)
RBC # BLD AUTO: 2.79 M/UL (ref 4.05–5.25)
SODIUM SERPL-SCNC: 139 MMOL/L (ref 136–145)
WBC # BLD AUTO: 4.4 K/UL (ref 4.3–11.1)

## 2019-12-30 PROCEDURE — 80053 COMPREHEN METABOLIC PANEL: CPT

## 2019-12-30 PROCEDURE — 65270000029 HC RM PRIVATE

## 2019-12-30 PROCEDURE — 99223 1ST HOSP IP/OBS HIGH 75: CPT | Performed by: INTERNAL MEDICINE

## 2019-12-30 PROCEDURE — 3E04305 INTRODUCTION OF OTHER ANTINEOPLASTIC INTO CENTRAL VEIN, PERCUTANEOUS APPROACH: ICD-10-PCS | Performed by: INTERNAL MEDICINE

## 2019-12-30 PROCEDURE — 83735 ASSAY OF MAGNESIUM: CPT

## 2019-12-30 PROCEDURE — 74011250637 HC RX REV CODE- 250/637: Performed by: NURSE PRACTITIONER

## 2019-12-30 PROCEDURE — 65270000015 HC RM PRIVATE ONCOLOGY

## 2019-12-30 PROCEDURE — 85025 COMPLETE CBC W/AUTO DIFF WBC: CPT

## 2019-12-30 PROCEDURE — 74011250636 HC RX REV CODE- 250/636: Performed by: NURSE PRACTITIONER

## 2019-12-30 RX ORDER — VORICONAZOLE 200 MG/1
200 TABLET, FILM COATED ORAL EVERY 12 HOURS
Status: DISCONTINUED | OUTPATIENT
Start: 2019-12-30 | End: 2020-01-01 | Stop reason: HOSPADM

## 2019-12-30 RX ORDER — HYDROCODONE BITARTRATE AND ACETAMINOPHEN 5; 325 MG/1; MG/1
1 TABLET ORAL
Status: DISCONTINUED | OUTPATIENT
Start: 2019-12-30 | End: 2020-01-01 | Stop reason: HOSPADM

## 2019-12-30 RX ORDER — DIPHENHYDRAMINE HYDROCHLORIDE 50 MG/ML
50 INJECTION, SOLUTION INTRAMUSCULAR; INTRAVENOUS AS NEEDED
Status: CANCELLED
Start: 2019-12-30

## 2019-12-30 RX ORDER — ALBUTEROL SULFATE 0.83 MG/ML
2.5 SOLUTION RESPIRATORY (INHALATION) AS NEEDED
Status: CANCELLED
Start: 2019-12-30

## 2019-12-30 RX ORDER — MORPHINE SULFATE 2 MG/ML
2 INJECTION, SOLUTION INTRAMUSCULAR; INTRAVENOUS
Status: DISCONTINUED | OUTPATIENT
Start: 2019-12-30 | End: 2020-01-01 | Stop reason: HOSPADM

## 2019-12-30 RX ORDER — POTASSIUM CHLORIDE 20 MEQ/1
20 TABLET, EXTENDED RELEASE ORAL 2 TIMES DAILY
Status: DISCONTINUED | OUTPATIENT
Start: 2019-12-30 | End: 2020-01-01 | Stop reason: HOSPADM

## 2019-12-30 RX ORDER — ACYCLOVIR 800 MG/1
400 TABLET ORAL 2 TIMES DAILY
Status: DISCONTINUED | OUTPATIENT
Start: 2019-12-30 | End: 2020-01-01 | Stop reason: HOSPADM

## 2019-12-30 RX ORDER — CYANOCOBALAMIN (VITAMIN B-12) 500 MCG
800 TABLET ORAL DAILY
Status: DISCONTINUED | OUTPATIENT
Start: 2019-12-31 | End: 2020-01-01 | Stop reason: HOSPADM

## 2019-12-30 RX ORDER — ONDANSETRON 2 MG/ML
4 INJECTION INTRAMUSCULAR; INTRAVENOUS
Status: DISCONTINUED | OUTPATIENT
Start: 2019-12-30 | End: 2020-01-01 | Stop reason: HOSPADM

## 2019-12-30 RX ORDER — ENOXAPARIN SODIUM 100 MG/ML
40 INJECTION SUBCUTANEOUS EVERY 24 HOURS
Status: DISCONTINUED | OUTPATIENT
Start: 2019-12-30 | End: 2020-01-01

## 2019-12-30 RX ORDER — HYDROCORTISONE SODIUM SUCCINATE 100 MG/2ML
100 INJECTION, POWDER, FOR SOLUTION INTRAMUSCULAR; INTRAVENOUS AS NEEDED
Status: CANCELLED | OUTPATIENT
Start: 2019-12-30

## 2019-12-30 RX ORDER — LEVOFLOXACIN 500 MG/1
500 TABLET, FILM COATED ORAL DAILY
Status: DISCONTINUED | OUTPATIENT
Start: 2019-12-31 | End: 2019-12-31

## 2019-12-30 RX ORDER — ONDANSETRON 2 MG/ML
8 INJECTION INTRAMUSCULAR; INTRAVENOUS AS NEEDED
Status: CANCELLED | OUTPATIENT
Start: 2019-12-30

## 2019-12-30 RX ORDER — SODIUM CHLORIDE 9 MG/ML
75 INJECTION, SOLUTION INTRAVENOUS CONTINUOUS
Status: DISCONTINUED | OUTPATIENT
Start: 2019-12-30 | End: 2020-01-01

## 2019-12-30 RX ORDER — PRAVASTATIN SODIUM 20 MG/1
10 TABLET ORAL
Status: DISCONTINUED | OUTPATIENT
Start: 2019-12-30 | End: 2020-01-01 | Stop reason: HOSPADM

## 2019-12-30 RX ORDER — LIDOCAINE AND PRILOCAINE 25; 25 MG/G; MG/G
CREAM TOPICAL AS NEEDED
Status: DISCONTINUED | OUTPATIENT
Start: 2019-12-30 | End: 2020-01-01 | Stop reason: HOSPADM

## 2019-12-30 RX ORDER — ZOLPIDEM TARTRATE 5 MG/1
5 TABLET ORAL
Status: DISCONTINUED | OUTPATIENT
Start: 2019-12-30 | End: 2020-01-03

## 2019-12-30 RX ORDER — ACETAMINOPHEN 325 MG/1
650 TABLET ORAL AS NEEDED
Status: CANCELLED
Start: 2019-12-30

## 2019-12-30 RX ORDER — LORAZEPAM 1 MG/1
1 TABLET ORAL
Status: DISCONTINUED | OUTPATIENT
Start: 2019-12-30 | End: 2020-01-01 | Stop reason: HOSPADM

## 2019-12-30 RX ORDER — EPINEPHRINE 1 MG/ML
0.3 INJECTION, SOLUTION, CONCENTRATE INTRAVENOUS AS NEEDED
Status: CANCELLED | OUTPATIENT
Start: 2019-12-30

## 2019-12-30 RX ORDER — ALLOPURINOL 300 MG/1
300 TABLET ORAL DAILY
Status: DISCONTINUED | OUTPATIENT
Start: 2019-12-31 | End: 2020-01-01 | Stop reason: HOSPADM

## 2019-12-30 RX ORDER — DULOXETIN HYDROCHLORIDE 30 MG/1
30 CAPSULE, DELAYED RELEASE ORAL DAILY
Status: DISCONTINUED | OUTPATIENT
Start: 2019-12-31 | End: 2020-01-01 | Stop reason: HOSPADM

## 2019-12-30 RX ADMIN — ZOLPIDEM TARTRATE 5 MG: 5 TABLET ORAL at 23:17

## 2019-12-30 RX ADMIN — LORAZEPAM 1 MG: 1 TABLET ORAL at 21:53

## 2019-12-30 RX ADMIN — ENOXAPARIN SODIUM 40 MG: 40 INJECTION SUBCUTANEOUS at 20:13

## 2019-12-30 RX ADMIN — VORICONAZOLE 200 MG: 200 TABLET, FILM COATED ORAL at 20:13

## 2019-12-30 RX ADMIN — PRAVASTATIN SODIUM 10 MG: 20 TABLET ORAL at 21:53

## 2019-12-30 RX ADMIN — ACYCLOVIR 400 MG: 800 TABLET ORAL at 20:13

## 2019-12-30 RX ADMIN — ACETAMINOPHEN: 500 TABLET, FILM COATED ORAL at 21:53

## 2019-12-30 NOTE — PROGRESS NOTES
12/30/19- Pt was seen in the office with Dr Sana Paz for follow up. Dr Sana Paz discussed Egeland Ek results as well as treatment options with FLAG vs HIDAC. Pt in agreement to be admitted today to start FLAG with reduced dose Fludarabine. Pt to be admitted to room 528.

## 2019-12-31 LAB
ALBUMIN SERPL-MCNC: 2.9 G/DL (ref 3.2–4.6)
ALBUMIN/GLOB SERPL: 0.9 {RATIO} (ref 1.2–3.5)
ALP SERPL-CCNC: 100 U/L (ref 50–136)
ALT SERPL-CCNC: 28 U/L (ref 12–65)
ANION GAP SERPL CALC-SCNC: 6 MMOL/L (ref 7–16)
AST SERPL-CCNC: 20 U/L (ref 15–37)
BASOPHILS # BLD: 0 K/UL (ref 0–0.2)
BASOPHILS NFR BLD: 1 % (ref 0–2)
BILIRUB SERPL-MCNC: 0.2 MG/DL (ref 0.2–1.1)
BUN SERPL-MCNC: 11 MG/DL (ref 8–23)
CALCIUM SERPL-MCNC: 10 MG/DL (ref 8.3–10.4)
CHLORIDE SERPL-SCNC: 109 MMOL/L (ref 98–107)
CO2 SERPL-SCNC: 28 MMOL/L (ref 21–32)
CREAT SERPL-MCNC: 0.66 MG/DL (ref 0.6–1)
DIFFERENTIAL METHOD BLD: ABNORMAL
EOSINOPHIL # BLD: 0.3 K/UL (ref 0–0.8)
EOSINOPHIL NFR BLD: 8 % (ref 0.5–7.8)
ERYTHROCYTE [DISTWIDTH] IN BLOOD BY AUTOMATED COUNT: 20.9 % (ref 11.9–14.6)
GLOBULIN SER CALC-MCNC: 3.4 G/DL (ref 2.3–3.5)
GLUCOSE SERPL-MCNC: 97 MG/DL (ref 65–100)
HCT VFR BLD AUTO: 27.1 % (ref 35.8–46.3)
HGB BLD-MCNC: 8.5 G/DL (ref 11.7–15.4)
IMM GRANULOCYTES # BLD AUTO: 0 K/UL (ref 0–0.5)
IMM GRANULOCYTES NFR BLD AUTO: 1 % (ref 0–5)
LYMPHOCYTES # BLD: 0.5 K/UL (ref 0.5–4.6)
LYMPHOCYTES NFR BLD: 12 % (ref 13–44)
MAGNESIUM SERPL-MCNC: 1.9 MG/DL (ref 1.8–2.4)
MCH RBC QN AUTO: 31.4 PG (ref 26.1–32.9)
MCHC RBC AUTO-ENTMCNC: 31.4 G/DL (ref 31.4–35)
MCV RBC AUTO: 100 FL (ref 79.6–97.8)
MONOCYTES # BLD: 1.9 K/UL (ref 0.1–1.3)
MONOCYTES NFR BLD: 46 % (ref 4–12)
NEUTS SEG # BLD: 1.3 K/UL (ref 1.7–8.2)
NEUTS SEG NFR BLD: 32 % (ref 43–78)
NRBC # BLD: 0 K/UL (ref 0–0.2)
PLATELET # BLD AUTO: 215 K/UL (ref 150–450)
PMV BLD AUTO: 9.8 FL (ref 9.4–12.3)
POTASSIUM SERPL-SCNC: 3.7 MMOL/L (ref 3.5–5.1)
PROT SERPL-MCNC: 6.3 G/DL (ref 6.3–8.2)
RBC # BLD AUTO: 2.71 M/UL (ref 4.05–5.2)
SODIUM SERPL-SCNC: 143 MMOL/L (ref 136–145)
WBC # BLD AUTO: 4.1 K/UL (ref 4.3–11.1)

## 2019-12-31 PROCEDURE — 65270000029 HC RM PRIVATE

## 2019-12-31 PROCEDURE — 65270000015 HC RM PRIVATE ONCOLOGY

## 2019-12-31 PROCEDURE — 74011250636 HC RX REV CODE- 250/636: Performed by: NURSE PRACTITIONER

## 2019-12-31 PROCEDURE — 74011250637 HC RX REV CODE- 250/637: Performed by: NURSE PRACTITIONER

## 2019-12-31 PROCEDURE — 74011000258 HC RX REV CODE- 258: Performed by: INTERNAL MEDICINE

## 2019-12-31 PROCEDURE — 99232 SBSQ HOSP IP/OBS MODERATE 35: CPT | Performed by: INTERNAL MEDICINE

## 2019-12-31 PROCEDURE — 80053 COMPREHEN METABOLIC PANEL: CPT

## 2019-12-31 PROCEDURE — 85025 COMPLETE CBC W/AUTO DIFF WBC: CPT

## 2019-12-31 PROCEDURE — 74011000250 HC RX REV CODE- 250: Performed by: INTERNAL MEDICINE

## 2019-12-31 PROCEDURE — 74011250636 HC RX REV CODE- 250/636: Performed by: INTERNAL MEDICINE

## 2019-12-31 PROCEDURE — 83735 ASSAY OF MAGNESIUM: CPT

## 2019-12-31 RX ORDER — PREDNISOLONE ACETATE 10 MG/ML
2 SUSPENSION/ DROPS OPHTHALMIC EVERY 6 HOURS
Status: DISCONTINUED | OUTPATIENT
Start: 2019-12-31 | End: 2020-01-01 | Stop reason: ALTCHOICE

## 2019-12-31 RX ORDER — DEXAMETHASONE SODIUM PHOSPHATE 100 MG/10ML
10 INJECTION INTRAMUSCULAR; INTRAVENOUS EVERY 24 HOURS
Status: COMPLETED | OUTPATIENT
Start: 2019-12-31 | End: 2020-01-04

## 2019-12-31 RX ORDER — LORAZEPAM 2 MG/ML
0.5 INJECTION INTRAMUSCULAR
Status: DISCONTINUED | OUTPATIENT
Start: 2019-12-31 | End: 2020-01-01 | Stop reason: HOSPADM

## 2019-12-31 RX ORDER — VANCOMYCIN 2 GRAM/500 ML IN 0.9 % SODIUM CHLORIDE INTRAVENOUS
2000 ONCE
Status: COMPLETED | OUTPATIENT
Start: 2019-12-31 | End: 2019-12-31

## 2019-12-31 RX ORDER — VANCOMYCIN HYDROCHLORIDE
1250 EVERY 12 HOURS
Status: DISCONTINUED | OUTPATIENT
Start: 2020-01-01 | End: 2020-01-02

## 2019-12-31 RX ORDER — ONDANSETRON 2 MG/ML
8 INJECTION INTRAMUSCULAR; INTRAVENOUS EVERY 8 HOURS
Status: COMPLETED | OUTPATIENT
Start: 2019-12-31 | End: 2020-01-01

## 2019-12-31 RX ADMIN — VORICONAZOLE 200 MG: 200 TABLET, FILM COATED ORAL at 08:45

## 2019-12-31 RX ADMIN — DEXAMETHASONE SODIUM PHOSPHATE 10 MG: 10 INJECTION INTRAMUSCULAR; INTRAVENOUS at 13:39

## 2019-12-31 RX ADMIN — PREDNISOLONE ACETATE 2 DROP: 10 SUSPENSION/ DROPS OPHTHALMIC at 16:31

## 2019-12-31 RX ADMIN — Medication 2 TABLET: at 08:45

## 2019-12-31 RX ADMIN — ONDANSETRON 8 MG: 2 INJECTION INTRAMUSCULAR; INTRAVENOUS at 13:39

## 2019-12-31 RX ADMIN — CYTARABINE 3900 MG: 2 INJECTION INTRATHECAL; INTRAVENOUS; SUBCUTANEOUS at 19:43

## 2019-12-31 RX ADMIN — FLUDARABINE PHOSPHATE 39 MG: 25 INJECTION, SOLUTION INTRAVENOUS at 14:11

## 2019-12-31 RX ADMIN — DULOXETINE 30 MG: 30 CAPSULE, DELAYED RELEASE ORAL at 08:49

## 2019-12-31 RX ADMIN — PRAVASTATIN SODIUM 10 MG: 20 TABLET ORAL at 21:29

## 2019-12-31 RX ADMIN — LORAZEPAM 1 MG: 1 TABLET ORAL at 21:29

## 2019-12-31 RX ADMIN — VANCOMYCIN HYDROCHLORIDE 2000 MG: 10 INJECTION, POWDER, LYOPHILIZED, FOR SOLUTION INTRAVENOUS at 14:53

## 2019-12-31 RX ADMIN — ACETAMINOPHEN: 500 TABLET, FILM COATED ORAL at 21:28

## 2019-12-31 RX ADMIN — ONDANSETRON 8 MG: 2 INJECTION INTRAMUSCULAR; INTRAVENOUS at 19:50

## 2019-12-31 RX ADMIN — ACYCLOVIR 400 MG: 800 TABLET ORAL at 08:45

## 2019-12-31 RX ADMIN — POTASSIUM CHLORIDE 20 MEQ: 20 TABLET, EXTENDED RELEASE ORAL at 16:32

## 2019-12-31 RX ADMIN — POTASSIUM CHLORIDE 20 MEQ: 20 TABLET, EXTENDED RELEASE ORAL at 08:45

## 2019-12-31 RX ADMIN — ZOLPIDEM TARTRATE 5 MG: 5 TABLET ORAL at 21:33

## 2019-12-31 RX ADMIN — ALLOPURINOL 300 MG: 300 TABLET ORAL at 08:45

## 2019-12-31 RX ADMIN — ENOXAPARIN SODIUM 40 MG: 40 INJECTION SUBCUTANEOUS at 19:43

## 2019-12-31 RX ADMIN — ACYCLOVIR 400 MG: 800 TABLET ORAL at 16:32

## 2019-12-31 RX ADMIN — LEVOFLOXACIN 500 MG: 500 TABLET, FILM COATED ORAL at 08:45

## 2019-12-31 RX ADMIN — VORICONAZOLE 200 MG: 200 TABLET, FILM COATED ORAL at 19:50

## 2019-12-31 NOTE — PROGRESS NOTES
[facility]        Inpatient Hematology / Oncology Progress Note      Admission Date: 2019  5:52 PM  Reason for Admission/Hospital Course: Admission for antineoplastic chemotherapy [Z51.11]      24 Hour Events:  Afebrile, VSS  C/o ingrown toenail--on Vanc for this  Day 2 FLAG   at bedside    ROS:  Constitutional: Negative for fever, chills, weakness, malaise, fatigue. CV: Negative for chest pain, palpitations, edema. Respiratory: Negative for dyspnea, cough, wheezing. GI: Negative for nausea, abdominal pain, diarrhea. 10 point review of systems is otherwise negative with the exception of the elements mentioned above in the HPI. No Known Allergies    OBJECTIVE:  Patient Vitals for the past 8 hrs:   BP Temp Pulse Resp SpO2   20 0723 134/60 98.2 °F (36.8 °C) 82 16 98 %   20 0333 133/56 97.6 °F (36.4 °C) 91 16 96 %     Temp (24hrs), Av.1 °F (36.7 °C), Min:97.6 °F (36.4 °C), Max:98.3 °F (36.8 °C)     0701 -  1900  In: -   Out: 900 [Urine:900]    Physical Exam:  Constitutional: Well developed, well nourished female in no acute distress, sitting comfortably on the bedside couch. HEENT: Normocephalic and atraumatic. Oropharynx is clear, mucous membranes are moist.  Extraocular muscles are intact. Sclerae anicteric. Neck supple without JVD. No thyromegaly present. Skin Warm and dry. No bruising and no rash noted. No pallor. +Erythematous, edematous first toe with ingrown toenail, covered with bandaid. Respiratory Lungs are clear to auscultation bilaterally without wheezes, rales or rhonchi, normal air exchange without accessory muscle use. CVS Normal rate, regular rhythm and normal S1 and S2. No murmurs, gallops, or rubs. Abdomen Soft, nontender and nondistended, normoactive bowel sounds. No palpable mass. No hepatosplenomegaly. Neuro Grossly nonfocal with no obvious sensory or motor deficits.    MSK Normal range of motion in general.  No edema and no tenderness. Psych Appropriate mood and affect.         Labs:      Recent Labs     01/01/20  0451 12/31/19  0258 12/30/19  1437   WBC 4.1* 4.1* 4.4   RBC 2.75* 2.71* 2.79*   HGB 9.0* 8.5* 8.9*   HCT 27.6* 27.1* 27.7*   .4* 100.0* 99.3*   MCH 32.7 31.4 31.9   MCHC 32.6 31.4 32.1   RDW 20.9* 20.9* 21.2*    215 208   GRANS 88* 32* 36*   LYMPH 5* 12* 11*   MONOS 6 46* 45*   EOS 0* 8* 6   BASOS 1 1 1   IG 1 1 1   DF AUTOMATED AUTOMATED AUTOMATED   ANEU 3.6 1.3* 1.6*   ABL 0.2* 0.5 0.5   ABM 0.3 1.9* 2.0*   ALEKSANDR 0.0 0.3 0.3   ABB 0.0 0.0 0.0   AIG 0.0 0.0 0.1        Recent Labs     01/01/20  0451 12/31/19  0258 12/30/19  1437    143 139   K 4.3 3.7 4.3   * 109* 107   CO2 27 28 24   AGAP 5* 6* 8   * 97 110*   BUN 14 11 13   CREA 0.61 0.66 0.77   GFRAA >60 >60 >60   GFRNA >60 >60 >60   CA 9.7 10.0 9.5   SGOT 18 20 25    100 112   TP 6.5 6.3 6.8   ALB 2.9* 2.9* 3.2   GLOB 3.6* 3.4 3.6*   AGRAT 0.8* 0.9* 0.9*   MG 2.0 1.9 1.7*         Imaging: n/a    Medications:  Current Facility-Administered Medications   Medication Dose Route Frequency    dexamethasone (DECADRON) injection 10 mg  10 mg IntraVENous Q24H    LORazepam (ATIVAN) injection 0.5 mg  0.5 mg IntraVENous Q6H PRN    prednisoLONE acetate (PRED FORTE) 1 % ophthalmic suspension 2 Drop  2 Drop Both Eyes Q6H    fludarabine (FLUDARA) 39 mg in 0.9% sodium chloride 100 mL chemo infusion  20 mg/m2 (Treatment Plan Recorded) IntraVENous Q24H    cytarabine (CYTOSAR) 3,900 mg in 0.9% sodium chloride 500 mL chemo infusion  2 g/m2 (Treatment Plan Recorded) IntraVENous Q24H    [START ON 1/6/2020] tbo-filgrastim (GRANIX) injection 480 mcg  480 mcg SubCUTAneous QHS    vit C,G-Oo-lojmo-lutein-zeaxan (PRESERVISION AREDS-2) capsule 1 Cap (Patient Supplied)  1 Cap Oral DAILY    vancomycin (VANCOCIN) 1250 mg in  ml infusion  1,250 mg IntraVENous Q12H    acetaminophen/diphenhydrAMINE (TYLENOL PM EXT STR) 500/25 mg Oral QHS    acyclovir (ZOVIRAX) tablet 400 mg  400 mg Oral BID    allopurinol (ZYLOPRIM) tablet 300 mg  300 mg Oral DAILY    cholecalciferol (VITAMIN D3) (400 Units /10 mcg) tablet 2 Tab  800 Units Oral DAILY    DULoxetine (CYMBALTA) capsule 30 mg  30 mg Oral DAILY    lidocaine-prilocaine (EMLA) 2.5-2.5 % cream   Topical PRN    LORazepam (ATIVAN) tablet 1 mg  1 mg Oral QHS    potassium chloride (K-DUR, KLOR-CON) SR tablet 20 mEq  20 mEq Oral BID    pravastatin (PRAVACHOL) tablet 10 mg  10 mg Oral QHS    voriconazole (VFEND) tablet 200 mg  200 mg Oral Q12H    zolpidem (AMBIEN) tablet 5 mg  5 mg Oral QHS PRN    ondansetron (ZOFRAN) injection 4 mg  4 mg IntraVENous Q4H PRN    prochlorperazine (COMPAZINE) with saline injection 5 mg  5 mg IntraVENous Q6H PRN    HYDROcodone-acetaminophen (NORCO) 5-325 mg per tablet 1 Tab  1 Tab Oral Q6H PRN    morphine injection 2 mg  2 mg IntraVENous Q4H PRN    0.9% sodium chloride infusion  75 mL/hr IntraVENous CONTINUOUS    enoxaparin (LOVENOX) injection 40 mg  40 mg SubCUTAneous Q24H         ASSESSMENT:    Problem List  Date Reviewed: 12/19/2019          Codes Class Noted    Febrile neutropenia ([facility] ICD-10-CM: D70.9, R50.81  ICD-9-CM: 288.00, 780.61  9/21/2019        Pancytopenia due to antineoplastic chemotherapy [facility]) ICD-10-CM: W80.524, T45.1X5A  ICD-9-CM: 284.11, E933.1  6/12/2019        Cellulitis of neck ICD-10-CM: F25.438  ICD-9-CM: 682.1  6/12/2019        Immunocompromised status associated with infection ICD-10-CM: B99.9  ICD-9-CM: 136.9  6/12/2019        Port or reservoir infection ICD-10-CM: N82.316O  ICD-9-CM: 999.33  6/12/2019        Acute myeloid leukemia not having achieved remission ([facility] ICD-10-CM: C92.00  ICD-9-CM: 205.00  5/9/2019        Admission for antineoplastic chemotherapy ICD-10-CM: Z51.11  ICD-9-CM: V58.11  5/5/2019        AML (acute myeloblastic leukemia) ([facility] ICD-10-CM: C92.00  ICD-9-CM: 205.00  4/28/2019        Weakness generalized ICD-10-CM: R53.1  ICD-9-CM: 780.79  4/28/2019        Pancytopenia ([facility] ICD-10-CM: S26.946  ICD-9-CM: 284.19  4/28/2019        Thrombocytopenia ([facility] ICD-10-CM: D69.6  ICD-9-CM: 287.5  4/27/2019            Ms. Sebastien Weller is a 76 y.o. female admitted on 12/30/2019 with a primary diagnosis of There were no encounter diagnoses. .       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 Subsequently on Decitabine plus Gilteritinib x 3 cycles w/ persistent disease. S/p FLAG-VENITA 11/19 w persistent disease though improvement in blast percentage as well as cytopenias. FLAG-VENITA course was complicated by neutropenic fever and E fecalis/alpha strep bacteremia requiring abx course,  ID transitioned to oral Augmentin at discharge which she completed, port was retained. Counts have clearly improved with improvement in [address] and thrombocytopenia however BM biopsy with persistent disease open (cellularity described at 30% with 10-20% residual blasts on IHC). Results shared with patient, various options discussed moving forward. For now she will try to enjoy holidays/[place] with her family and return next week for likely reinduction. Will consider intermediate dose rivera-C based regimen. Seen today 12/30/2019: Enjoyed [place] at home. Some fatigue persists. Appetite slowly improving. Discussed various options again, counseled clearly improved: Various options discussed including intermediate dose rivera-C versus reinduction with modified FLAG (will hold off on Ayad Najjar given past anthracycline exposure), patient has opted for reinduction with FL AG which is reasonable. Denies any recent fevers or chills, okay to admit for reinduction. Continue prophylactic antibiotics. Will repeat BM biopsy on count recovery. She will stay in house until counts recover. PLAN:  AML  - admit for re-induction w modified dose FLAG  12/31 Day 1 FLAG. 1/1 Day 2 FLAG. Doing well. Counts stable. Ingrown toenail  12/31 Was going to defer treatment pending podiatrist consult, but no podiatrist will come see pt while IP. Gave pt the option of being discharged to see podiatrist prior to proceeding with treatment vs starting treatment along with Vanc. Pt prefers to start treatment. Vanc ordered. 1/1 on Vanc. Toe covered with bandaid today when seen. Continue home meds  Prophylactic Antibx: Acyclovir, Voriconazole  Alexander SOPs  Lovenox for DVT prophylaxis (hold for plt <50k)    Disposition:  Discharge pending completion of chemotherapy and count recovery. Will need repeat BMbx on count recovery. RTC within week of discharge. Goals and plan of care reviewed with the patient. All questions answered to the best of our ability.             Oscar Romero NP   [facility] Oncology  [address] [address]  Office : [phone]  Fax : [phone]

## 2019-12-31 NOTE — PROGRESS NOTES
Pharmacokinetic Consult to Pharmacist    Donna Chris is a 76 y.o. female being treated for SSTI with vancomycin. Weight: 81.1 kg (178 lb 14.4 oz)  Lab Results   Component Value Date/Time    BUN 11 12/31/2019 02:58 AM    Creatinine 0.66 12/31/2019 02:58 AM    WBC 4.1 (L) 12/31/2019 02:58 AM    Procalcitonin 0.1 09/21/2019 06:50 PM    Lactic Acid (POC) 0.67 09/21/2019 08:51 PM      Estimated Creatinine Clearance: 75 mL/min (by C-G formula based on SCr of 0.66 mg/dL). Day 1 of vancomycin. Goal trough is 10-20. Dosing started with 2g x 1 and then 1.25g q12h. Will continue to follow patient. Thank you,  Nell Hodges, Pharm. D.   Clinical Pharmacist  [phone]

## 2019-12-31 NOTE — PROGRESS NOTES
Problem: Falls - Risk of  Goal: *Absence of Falls  Description  Document Namrata President Fall Risk and appropriate interventions in the flowsheet.   Outcome: Progressing Towards Goal  Note: Fall Risk Interventions:  Mobility Interventions: Utilize walker, cane, or other assistive device         Medication Interventions: Teach patient to arise slowly         History of Falls Interventions: Room close to nurse's station, Investigate reason for fall         Problem: Patient Education: Go to Patient Education Activity  Goal: Patient/Family Education  Outcome: Progressing Towards Goal

## 2019-12-31 NOTE — H&P
Inpatient Hematology / Oncology History and Physical    Reason for Asmission:  Admission for antineoplastic chemotherapy [Z51.11]    History of Present Illness:  Ms. Mackenzie Fagan is a 76 y.o. female admitted on 12/30/2019 with a primary diagnosis of There were no encounter diagnoses. .      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 Subsequently on Decitabine plus Gilteritinib x 3 cycles w/ persistent disease. S/p FLAG-VENITA 11/19 w persistent disease though improvement in blast percentage as well as cytopenias. FLAG-VENITA course was complicated by neutropenic fever and E fecalis/alpha strep bacteremia requiring abx course,  ID transitioned to oral Augmentin at discharge which she completed, port was retained. Counts have clearly improved with improvement in [address] and thrombocytopenia however BM biopsy with persistent disease open (cellularity described at 30% with 10-20% residual blasts on IHC). Results shared with patient, various options discussed moving forward. For now she will try to enjoy holidays/[place] with her family and return next week for likely reinduction. Will consider intermediate dose rivera-C based regimen. Seen today 12/30/2019: Enjoyed [place] at home. Some fatigue persists. Appetite slowly improving. Discussed various options again, counseled clearly improved: Various options discussed including intermediate dose rivera-C versus reinduction with modified FLAG (will hold off on Jolinda Bougie given past anthracycline exposure), patient has opted for reinduction with FL AG which is reasonable. Denies any recent fevers or chills, okay to admit for reinduction. Continue prophylactic antibiotics. Will repeat BM biopsy on count recovery. She will stay in house until counts recover. Review of Systems:  As mentioned above. All other systems reviewed in full and are unremarkable.        No Known Allergies  Past Medical History:   Diagnosis Date    AML (acute myeloid leukemia) ([facility] dx- 4/2019    followed by dr Belen Espinoza    Depression     Hypercholesterolemia     Infection     of port -- was placed 5/2019-- removed 6/2019---right chest    Psychiatric disorder     aniexty    Sleep apnea      Past Surgical History:   Procedure Laterality Date    HX OTHER SURGICAL      colonoscopy    HX VASCULAR ACCESS      IR INSERT TUNL CVC W PORT OVER 5 YEARS  4/30/2019    IR INSERT TUNL CVC W PORT OVER 5 YEARS  7/15/2019    IR REMOVE TUNL CVAD W PORT/PUMP  6/13/2019     Family History   Problem Relation Age of Onset    Cancer Father      Social History     Socioeconomic History    Marital status:      Spouse name: Not on file    Number of children: Not on file    Years of education: Not on file    Highest education level: Not on file   Occupational History    Not on file   Social Needs    Financial resource strain: Not on file    Food insecurity:     Worry: Not on file     Inability: Not on file    Transportation needs:     Medical: Not on file     Non-medical: Not on file   Tobacco Use    Smoking status: Never Smoker    Smokeless tobacco: Never Used   Substance and Sexual Activity    Alcohol use: Never     Frequency: Never    Drug use: Never    Sexual activity: Not on file   Lifestyle    Physical activity:     Days per week: Not on file     Minutes per session: Not on file    Stress: Not on file   Relationships    Social connections:     Talks on phone: Not on file     Gets together: Not on file     Attends [employer]ism service: Not on file     Active member of club or organization: Not on file     Attends meetings of clubs or organizations: Not on file     Relationship status: Not on file    Intimate partner violence:     Fear of current or ex partner: Not on file     Emotionally abused: Not on file     Physically abused: Not on file     Forced sexual activity: Not on file   Other Topics Concern   [address] Service Not Asked    Blood Transfusions Not Asked    Caffeine Concern Not Asked    Occupational Exposure Not Asked    Hobby Hazards Not Asked    Sleep Concern Not Asked    Stress Concern Not Asked    Weight Concern Not Asked    Special Diet Not Asked    Back Care Not Asked    Exercise Not Asked    Bike Helmet Not Asked    Seat Belt Not Asked    Self-Exams Not Asked   Social History Narrative    Not on file     Current Facility-Administered Medications   Medication Dose Route Frequency Provider Last Rate Last Dose    acetaminophen/diphenhydrAMINE (TYLENOL PM EXT STR) 500/25 mg   Oral QHS Jerrod Garber NP        acyclovir (ZOVIRAX) tablet 400 mg  400 mg Oral BID Jerrod Garber NP        [START ON 12/31/2019] allopurinol (ZYLOPRIM) tablet 300 mg  300 mg Oral DAILY Jerrod Garber NP        [START ON 12/31/2019] cholecalciferol (VITAMIN D3) (400 Units /10 mcg) tablet 2 Tab  800 Units Oral DAILY Jerrod Garber NP        [START ON 12/31/2019] DULoxetine (CYMBALTA) capsule 30 mg  30 mg Oral DAILY Jerrod Garber NP        [START ON 12/31/2019] levoFLOXacin (LEVAQUIN) tablet 500 mg  500 mg Oral DAILY Jerrod Garber NP        lidocaine-prilocaine (EMLA) 2.5-2.5 % cream   Topical PRN Jerrod Garber NP        LORazepam (ATIVAN) tablet 1 mg  1 mg Oral QHS Jerrod Garber NP        potassium chloride (K-DUR, KLOR-CON) SR tablet 20 mEq  20 mEq Oral BID Jerrod Garber NP        pravastatin (PRAVACHOL) tablet 10 mg  10 mg Oral QHS Jerrod Garber NP        voriconazole (VFEND) tablet 200 mg  200 mg Oral Q12H Jerrod Garber NP        zolpidem (AMBIEN) tablet 5 mg  5 mg Oral QHS PRN Jerrod Garber NP        [facility]dansetron [facility]) injection 4 mg  4 mg IntraVENous Q4H PRN Jerrod Garber NP        prochlorperazine (COMPAZINE) with saline injection 5 mg  5 mg IntraVENous Q6H PRN Jerrod Garber NP        HYDROcodone-acetaminophen (NORCO) 5-325 mg per tablet 1 Tab  1 Tab Oral Q6H PRN Jerrod Garber NP        morphine injection 2 mg  2 mg IntraVENous Q4H PRN Jerrod Garber, NP        0.9% sodium chloride infusion  75 mL/hr IntraVENous CONTINUOUS Lani Chowdhury, ARMANDO        enoxaparin (LOVENOX) injection 40 mg  40 mg SubCUTAneous Q24H Lani Chowdhury NP           OBJECTIVE:  Patient Vitals for the past 8 hrs:   BP Temp Pulse Resp SpO2 Weight   19 1803 144/64 97.8 °F (36.6 °C) (!) 107 18 97 % 182 lb 6.4 oz (82.7 kg)     Temp (24hrs), Av.7 °F (36.5 °C), Min:97.5 °F (36.4 °C), Max:97.8 °F (36.6 °C)    No intake/output data recorded. Physical Exam:  Constitutional: Well developed, well nourished female in no acute distress, sitting comfortably in the hospital bed. HEENT: Normocephalic and atraumatic. Oropharynx is clear, mucous membranes are moist.  Pupils are equal, round, and reactive to light. Extraocular muscles are intact. Sclerae anicteric. Neck supple without JVD. No thyromegaly present. Lymph node   No palpable submandibular, cervical, supraclavicular, axillary or inguinal lymph nodes. Skin Warm and dry. No bruising and no rash noted. No erythema. No pallor. Respiratory Lungs are clear to auscultation bilaterally without wheezes, rales or rhonchi, normal air exchange without accessory muscle use. CVS Normal rate, regular rhythm and normal S1 and S2. No murmurs, gallops, or rubs. Abdomen Soft, nontender and nondistended, normoactive bowel sounds. No palpable mass. No hepatosplenomegaly. Neuro Grossly nonfocal with no obvious sensory or motor deficits. MSK Normal range of motion in general.  No edema and no tenderness. Psych Appropriate mood and affect.         Labs:    Recent Results (from the past 24 hour(s))   CBC WITH AUTOMATED DIFF    Collection Time: 19  2:37 PM   Result Value Ref Range    WBC 4.4 4.3 - 11.1 K/uL    RBC 2.79 (L) 4.05 - 5.25 M/uL    HGB 8.9 (L) 11.7 - 15.4 g/dL    HCT 27.7 (L) 35.8 - 46.3 %    MCV 99.3 (H) 79.6 - 97.8 FL    MCH 31.9 26.1 - 32.9 PG    MCHC 32.1 31.4 - 35.0 g/dL    RDW 21.2 (H) 11.9 - 14.6 %    PLATELET 477 150 - 450 K/uL    MPV 9.4 9.4 - 12.3 FL    ABSOLUTE NRBC 0.00 0.0 - 0.2 K/uL    NEUTROPHILS 36 (L) 43 - 78 %    LYMPHOCYTES 11 (L) 13 - 44 %    MONOCYTES 45 (H) 4.0 - 12.0 %    EOSINOPHILS 6 0.5 - 7.8 %    BASOPHILS 1 0.0 - 2.0 %    IMMATURE GRANULOCYTES 1 0.0 - 5.0 %    ABS. NEUTROPHILS 1.6 (L) 1.7 - 8.2 K/UL    ABS. LYMPHOCYTES 0.5 0.5 - 4.6 K/UL    ABS. MONOCYTES 2.0 (H) 0.1 - 1.3 K/UL    ABS. EOSINOPHILS 0.3 0.0 - 0.8 K/UL    ABS. BASOPHILS 0.0 0.0 - 0.2 K/UL    ABS. IMM. GRANS. 0.1 0.0 - 0.5 K/UL    DF AUTOMATED     METABOLIC PANEL, COMPREHENSIVE    Collection Time: 12/30/19  2:37 PM   Result Value Ref Range    Sodium 139 136 - 145 mmol/L    Potassium 4.3 3.5 - 5.1 mmol/L    Chloride 107 98 - 107 mmol/L    CO2 24 21 - 32 mmol/L    Anion gap 8 7 - 16 mmol/L    Glucose 110 (H) 65 - 100 mg/dL    BUN 13 8 - 23 MG/DL    Creatinine 0.77 0.6 - 1.0 MG/DL    GFR est AA >60 >60 ml/min/1.73m2    GFR est non-AA >60 >60 ml/min/1.73m2    Calcium 9.5 8.3 - 10.4 MG/DL    Bilirubin, total 0.3 0.2 - 1.1 MG/DL    ALT (SGPT) 31 12 - 65 U/L    AST (SGOT) 25 15 - 37 U/L    Alk. phosphatase 112 50 - 136 U/L    Protein, total 6.8 6.3 - 8.2 g/dL    Albumin 3.2 3.2 - 4.6 g/dL    Globulin 3.6 (H) 2.3 - 3.5 g/dL    A-G Ratio 0.9 (L) 1.2 - 3.5     MAGNESIUM    Collection Time: 12/30/19  2:37 PM   Result Value Ref Range    Magnesium 1.7 (L) 1.8 - 2.4 mg/dL       Imaging:  No images are attached to the encounter.     ASSESSMENT:  Problem List  Date Reviewed: 12/19/2019          Codes Class Noted    Febrile neutropenia ([facility] ICD-10-CM: D70.9, R50.81  ICD-9-CM: 288.00, 780.61  9/21/2019        Pancytopenia due to antineoplastic chemotherapy [facility]) ICD-10-CM: D61.810, T45.1X5A  ICD-9-CM: 284.11, E933.1  6/12/2019        Cellulitis of neck ICD-10-CM: Q78.635  ICD-9-CM: 682.1  6/12/2019        Immunocompromised status associated with infection ICD-10-CM: B99.9  ICD-9-CM: 136.9  6/12/2019        Port or reservoir infection ICD-10-CM: J40.689R  ICD-9-CM: 999.33  6/12/2019        Acute myeloid leukemia not having achieved remission [facility]) ICD-10-CM: C92.00  ICD-9-CM: 205.00  5/9/2019        Admission for antineoplastic chemotherapy ICD-10-CM: Z51.11  ICD-9-CM: V58.11  5/5/2019        AML (acute myeloblastic leukemia) ([facility] ICD-10-CM: C92.00  ICD-9-CM: 205.00  4/28/2019        Weakness generalized ICD-10-CM: R53.1  ICD-9-CM: 780.79  4/28/2019        Pancytopenia ([facility] ICD-10-CM: L04.796  ICD-9-CM: 284.19  4/28/2019        Thrombocytopenia ([facility]) ICD-10-CM: D69.6  ICD-9-CM: 287.5  4/27/2019              ASSESSMENT:  76 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 Subsequently on Decitabine plus Gilteritinib x 3 cycles w/ persistent disease. S/p FLAG-VENITA 11/19 w persistent disease though improvement in blast percentage as well as cytopenias. FLAG-VENITA course was complicated by neutropenic fever and E fecalis/alpha strep bacteremia requiring abx course,  ID transitioned to oral Augmentin at discharge which she completed, port was retained. Counts have clearly improved with improvement in [address] and thrombocytopenia however BM biopsy with persistent disease open (cellularity described at 30% with 10-20% residual blasts on IHC). Results shared with patient, various options discussed moving forward. For now she will try to enjoy holidays/[place] with her family and return next week for likely reinduction. Will consider intermediate dose rivera-C based regimen. Seen today 12/30/2019: Enjoyed [place] at home. Some fatigue persists. Appetite slowly improving. Discussed various options again, counseled clearly improved: Various options discussed including intermediate dose rivera-C versus reinduction with modified FLAG (will hold off on Sarahy Melvi given past anthracycline exposure), patient has opted for reinduction with FL AG which is reasonable.   Denies any recent fevers or chills, okay to admit for reinduction. Continue prophylactic antibiotics. Will repeat BM biopsy on count recovery. She will stay in house until counts recover. 1. 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 On Decitabine plus Gilteritinib x 3 cycles w/ persistent disease. S/p FLAG-VENITA 11/19 w improved though persistent disease. 2. H/o Port infection/bacteremia     PLAN:  - As above. Persistent disease. Re-induction w modified dose FLAG  - Gentle hydration  - Continue prophylactic antibiotics w/ diflucan, acyclovir and levaquin  - Twice weekly labs w transfusion as needed  -  Will repeat BM biopsy on count recovery. She will stay in house until counts recover. RTC within week of discahrge    Lab studies and imaging studies (CT/CXR) were personally reviewed. Pertinent old records were reviewed    Thank you for allowing us to participate in the care of Ms. Veronica Calderon.              Arabella Guerrero MD  [address]  Office : [phone]  Fax : [phone]

## 2019-12-31 NOTE — PROGRESS NOTES
[facility]        Inpatient Hematology / Oncology Progress Note      Admission Date: 2019  5:52 PM  Reason for Admission/Hospital Course: Admission for antineoplastic chemotherapy [Z51.11]      24 Hour Events:  Afebrile, VSS  C/o ingrown toenail  Day 1 FLAG   at bedside    ROS:  Constitutional: Negative for fever, chills, weakness, malaise, fatigue. CV: Negative for chest pain, palpitations, edema. Respiratory: Negative for dyspnea, cough, wheezing. GI: Negative for nausea, abdominal pain, diarrhea. 10 point review of systems is otherwise negative with the exception of the elements mentioned above in the HPI. No Known Allergies    OBJECTIVE:  Patient Vitals for the past 8 hrs:   BP Temp Pulse Resp SpO2   19 0755 135/75 97.5 °F (36.4 °C) 100 17 97 %   19 0241 125/45 98.1 °F (36.7 °C) 90 16 93 %     Temp (24hrs), Av.9 °F (36.6 °C), Min:97.5 °F (36.4 °C), Max:98.4 °F (36.9 °C)     0701 -  1900  In: -   Out: 800 [Urine:800]    Physical Exam:  Constitutional: Well developed, well nourished female in no acute distress, sitting comfortably on the bedside couch. HEENT: Normocephalic and atraumatic. Oropharynx is clear, mucous membranes are moist.  Extraocular muscles are intact. Sclerae anicteric. Neck supple without JVD. No thyromegaly present. Skin Warm and dry. No bruising and no rash noted. No pallor. Erythematous, edematous first toe with ingrown toenail. Respiratory Lungs are clear to auscultation bilaterally without wheezes, rales or rhonchi, normal air exchange without accessory muscle use. CVS Normal rate, regular rhythm and normal S1 and S2. No murmurs, gallops, or rubs. Abdomen Soft, nontender and nondistended, normoactive bowel sounds. No palpable mass. No hepatosplenomegaly. Neuro Grossly nonfocal with no obvious sensory or motor deficits. MSK Normal range of motion in general.  No edema and no tenderness.    Psych Appropriate mood and affect.         Labs:      Recent Labs     12/31/19  0258 12/30/19  1437   WBC 4.1* 4.4   RBC 2.71* 2.79*   HGB 8.5* 8.9*   HCT 27.1* 27.7*   .0* 99.3*   MCH 31.4 31.9   MCHC 31.4 32.1   RDW 20.9* 21.2*    208   GRANS 32* 36*   LYMPH 12* 11*   MONOS 46* 45*   EOS 8* 6   BASOS 1 1   IG 1 1   DF AUTOMATED AUTOMATED   ANEU 1.3* 1.6*   ABL 0.5 0.5   ABM 1.9* 2.0*   ALEKSANDR 0.3 0.3   ABB 0.0 0.0   AIG 0.0 0.1        Recent Labs     12/31/19  0258 12/30/19  1437    139   K 3.7 4.3   * 107   CO2 28 24   AGAP 6* 8   GLU 97 110*   BUN 11 13   CREA 0.66 0.77   GFRAA >60 >60   GFRNA >60 >60   CA 10.0 9.5   SGOT 20 25    112   TP 6.3 6.8   ALB 2.9* 3.2   GLOB 3.4 3.6*   AGRAT 0.9* 0.9*   MG 1.9 1.7*         Imaging: n/a    Medications:  Current Facility-Administered Medications   Medication Dose Route Frequency    acetaminophen/diphenhydrAMINE (TYLENOL PM EXT STR) 500/25 mg   Oral QHS    acyclovir (ZOVIRAX) tablet 400 mg  400 mg Oral BID    allopurinol (ZYLOPRIM) tablet 300 mg  300 mg Oral DAILY    cholecalciferol (VITAMIN D3) (400 Units /10 mcg) tablet 2 Tab  800 Units Oral DAILY    DULoxetine (CYMBALTA) capsule 30 mg  30 mg Oral DAILY    levoFLOXacin (LEVAQUIN) tablet 500 mg  500 mg Oral DAILY    lidocaine-prilocaine (EMLA) 2.5-2.5 % cream   Topical PRN    LORazepam (ATIVAN) tablet 1 mg  1 mg Oral QHS    potassium chloride (K-DUR, KLOR-CON) SR tablet 20 mEq  20 mEq Oral BID    pravastatin (PRAVACHOL) tablet 10 mg  10 mg Oral QHS    voriconazole (VFEND) tablet 200 mg  200 mg Oral Q12H    zolpidem (AMBIEN) tablet 5 mg  5 mg Oral QHS PRN    ondansetron (ZOFRAN) injection 4 mg  4 mg IntraVENous Q4H PRN    prochlorperazine (COMPAZINE) with saline injection 5 mg  5 mg IntraVENous Q6H PRN    HYDROcodone-acetaminophen (NORCO) 5-325 mg per tablet 1 Tab  1 Tab Oral Q6H PRN    morphine injection 2 mg  2 mg IntraVENous Q4H PRN    0.9% sodium chloride infusion  75 mL/hr IntraVENous CONTINUOUS    enoxaparin (LOVENOX) injection 40 mg  40 mg SubCUTAneous Q24H         ASSESSMENT:    Problem List  Date Reviewed: 12/19/2019          Codes Class Noted    Febrile neutropenia ([facility] ICD-10-CM: D70.9, R50.81  ICD-9-CM: 288.00, 780.61  9/21/2019        Pancytopenia due to antineoplastic chemotherapy [facility]) ICD-10-CM: D61.810, T45.1X5A  ICD-9-CM: 284.11, E933.1  6/12/2019        Cellulitis of neck ICD-10-CM: B32.105  ICD-9-CM: 682.1  6/12/2019        Immunocompromised status associated with infection ICD-10-CM: B99.9  ICD-9-CM: 136.9  6/12/2019        Port or reservoir infection ICD-10-CM: T80.212A  ICD-9-CM: 999.33  6/12/2019        Acute myeloid leukemia not having achieved remission ([facility] ICD-10-CM: C92.00  ICD-9-CM: 205.00  5/9/2019        Admission for antineoplastic chemotherapy ICD-10-CM: Z51.11  ICD-9-CM: V58.11  5/5/2019        AML (acute myeloblastic leukemia) ([facility] ICD-10-CM: C92.00  ICD-9-CM: 205.00  4/28/2019        Weakness generalized ICD-10-CM: R53.1  ICD-9-CM: 780.79  4/28/2019        Pancytopenia ([facility] ICD-10-CM: V16.460  ICD-9-CM: 284.19  4/28/2019        Thrombocytopenia ([facility] ICD-10-CM: D69.6  ICD-9-CM: 287.5  4/27/2019            Ms. Moy Arzola is a 76 y.o. female admitted on 12/30/2019 with a primary diagnosis of There were no encounter diagnoses. .       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 Subsequently on Decitabine plus Gilteritinib x 3 cycles w/ persistent disease. S/p FLAG-VENITA 11/19 w persistent disease though improvement in blast percentage as well as cytopenias. FLAG-VENITA course was complicated by neutropenic fever and E fecalis/alpha strep bacteremia requiring abx course,  ID transitioned to oral Augmentin at discharge which she completed, port was retained.  Counts have clearly improved with improvement in [address] and thrombocytopenia however BM biopsy with persistent disease open (cellularity described at 30% with 10-20% residual blasts on IHC). Results shared with patient, various options discussed moving forward. For now she will try to enjoy holidays/[place] with her family and return next week for likely reinduction. Will consider intermediate dose rivera-C based regimen. Seen today 12/30/2019: Enjoyed [place] at home. Some fatigue persists. Appetite slowly improving. Discussed various options again, counseled clearly improved: Various options discussed including intermediate dose rivera-C versus reinduction with modified FLAG (will hold off on Lauryn Jaramillo given past anthracycline exposure), patient has opted for reinduction with FL AG which is reasonable. Denies any recent fevers or chills, okay to admit for reinduction. Continue prophylactic antibiotics. Will repeat BM biopsy on count recovery. She will stay in house until counts recover. PLAN:  AML  - admit for re-induction w modified dose FLAG  12/31 Day 1 FLAG. Ingrown toenail  12/31 Was going to defer treatment pending podiatrist consult, but no podiatrist will come see pt while IP. Gave pt the option of being discharged to see podiatrist prior to proceeding with treatment vs starting treatment along with Vanc. Pt prefers to start treatment. Vanc ordered. Continue home meds  Prophylactic Antibx: Acyclovir, Voriconazole  Alexander SOPs  Lovenox for DVT prophylaxis (hold for plt <50k)    Disposition:  Discharge pending completion of chemotherapy and count recovery. Will need repeat BMbx on count recovery. RTC within week of discharge. Goals and plan of care reviewed with the patient. All questions answered to the best of our ability.             Jeneen Heimlich, NP   [facility]  [address] [place] [address]  Office : [phone]  Fax : [phone]

## 2020-01-01 ENCOUNTER — HOSPITAL ENCOUNTER (OUTPATIENT)
Dept: LAB | Age: 75
Discharge: HOME | End: 2020-10-12
Payer: MEDICARE

## 2020-01-01 ENCOUNTER — HOSPITAL ENCOUNTER (OUTPATIENT)
Dept: LAB | Age: 75
Discharge: HOME | End: 2020-12-07
Payer: MEDICARE

## 2020-01-01 ENCOUNTER — HOSPITAL ENCOUNTER (OUTPATIENT)
Dept: INFUSION THERAPY | Age: 75
End: 2020-01-01
Payer: MEDICARE

## 2020-01-01 ENCOUNTER — HOSPITAL ENCOUNTER (OUTPATIENT)
Dept: LAB | Age: 75
Discharge: HOME | End: 2020-11-25
Payer: MEDICARE

## 2020-01-01 ENCOUNTER — PATIENT OUTREACH (OUTPATIENT)
Dept: CASE MANAGEMENT | Age: 75
End: 2020-01-01

## 2020-01-01 ENCOUNTER — HOSPITAL ENCOUNTER (OUTPATIENT)
Dept: INFUSION THERAPY | Age: 75
Discharge: HOME | End: 2020-10-15
Payer: MEDICARE

## 2020-01-01 ENCOUNTER — HOSPITAL ENCOUNTER (OUTPATIENT)
Dept: INFUSION THERAPY | Age: 75
Discharge: HOME | End: 2020-08-20
Payer: MEDICARE

## 2020-01-01 ENCOUNTER — APPOINTMENT (OUTPATIENT)
Dept: INTERVENTIONAL RADIOLOGY/VASCULAR | Age: 75
DRG: 837 | End: 2020-01-01
Attending: INTERNAL MEDICINE
Payer: MEDICARE

## 2020-01-01 ENCOUNTER — HOSPITAL ENCOUNTER (OUTPATIENT)
Dept: CT IMAGING | Age: 75
Discharge: HOME | DRG: 809 | End: 2020-12-23
Attending: INTERNAL MEDICINE
Payer: MEDICARE

## 2020-01-01 ENCOUNTER — HOSPITAL ENCOUNTER (OUTPATIENT)
Dept: INFUSION THERAPY | Age: 75
Discharge: HOME | End: 2020-08-18
Payer: MEDICARE

## 2020-01-01 ENCOUNTER — HOSPITAL ENCOUNTER (OUTPATIENT)
Dept: LAB | Age: 75
Discharge: HOME | End: 2020-12-14
Payer: MEDICARE

## 2020-01-01 ENCOUNTER — HOSPITAL ENCOUNTER (OUTPATIENT)
Dept: INFUSION THERAPY | Age: 75
Discharge: HOME | End: 2020-09-03
Payer: MEDICARE

## 2020-01-01 ENCOUNTER — HOSPITAL ENCOUNTER (OUTPATIENT)
Dept: INFUSION THERAPY | Age: 75
Discharge: HOME | End: 2020-11-11
Payer: MEDICARE

## 2020-01-01 ENCOUNTER — HOSPITAL ENCOUNTER (INPATIENT)
Age: 75
LOS: 5 days | Discharge: HOME | DRG: 839 | End: 2020-05-05
Attending: INTERNAL MEDICINE | Admitting: INTERNAL MEDICINE
Payer: MEDICARE

## 2020-01-01 ENCOUNTER — HOSPITAL ENCOUNTER (OUTPATIENT)
Dept: INFUSION THERAPY | Age: 75
Discharge: HOME | End: 2020-11-02
Payer: MEDICARE

## 2020-01-01 ENCOUNTER — HOSPITAL ENCOUNTER (OUTPATIENT)
Dept: INFUSION THERAPY | Age: 75
Discharge: HOME | End: 2020-06-15
Payer: MEDICARE

## 2020-01-01 ENCOUNTER — HOSPITAL ENCOUNTER (OUTPATIENT)
Dept: CT IMAGING | Age: 75
Discharge: HOME | End: 2020-11-25
Attending: NURSE PRACTITIONER
Payer: MEDICARE

## 2020-01-01 ENCOUNTER — HOSPITAL ENCOUNTER (OUTPATIENT)
Dept: INFUSION THERAPY | Age: 75
Discharge: HOME | End: 2020-12-14
Payer: MEDICARE

## 2020-01-01 ENCOUNTER — HOSPITAL ENCOUNTER (OUTPATIENT)
Dept: INFUSION THERAPY | Age: 75
Discharge: HOME | End: 2020-01-24
Payer: MEDICARE

## 2020-01-01 ENCOUNTER — HOSPITAL ENCOUNTER (OUTPATIENT)
Dept: INFUSION THERAPY | Age: 75
Discharge: HOME | End: 2020-09-30
Payer: MEDICARE

## 2020-01-01 ENCOUNTER — APPOINTMENT (OUTPATIENT)
Dept: GENERAL RADIOLOGY | Age: 75
DRG: 809 | End: 2020-01-01
Attending: NURSE PRACTITIONER
Payer: MEDICARE

## 2020-01-01 ENCOUNTER — HOSPITAL ENCOUNTER (OUTPATIENT)
Dept: LAB | Age: 75
Discharge: HOME | End: 2020-03-13
Payer: MEDICARE

## 2020-01-01 ENCOUNTER — HOSPITAL ENCOUNTER (OUTPATIENT)
Dept: INFUSION THERAPY | Age: 75
Discharge: HOME | End: 2020-05-28
Payer: MEDICARE

## 2020-01-01 ENCOUNTER — APPOINTMENT (OUTPATIENT)
Dept: INFUSION THERAPY | Age: 75
End: 2020-01-01

## 2020-01-01 ENCOUNTER — HOSPITAL ENCOUNTER (OUTPATIENT)
Dept: LAB | Age: 75
Discharge: HOME | End: 2020-03-03
Payer: MEDICARE

## 2020-01-01 ENCOUNTER — HOSPITAL ENCOUNTER (OUTPATIENT)
Dept: INFUSION THERAPY | Age: 75
End: 2020-01-01

## 2020-01-01 ENCOUNTER — HOSPITAL ENCOUNTER (OUTPATIENT)
Dept: LAB | Age: 75
Discharge: HOME | End: 2020-05-11
Payer: MEDICARE

## 2020-01-01 ENCOUNTER — HOSPITAL ENCOUNTER (OUTPATIENT)
Dept: INFUSION THERAPY | Age: 75
Discharge: HOME | End: 2020-04-20
Payer: MEDICARE

## 2020-01-01 ENCOUNTER — HOSPITAL ENCOUNTER (OUTPATIENT)
Dept: INFUSION THERAPY | Age: 75
Discharge: HOME | End: 2020-06-22
Payer: MEDICARE

## 2020-01-01 ENCOUNTER — APPOINTMENT (OUTPATIENT)
Dept: INFUSION THERAPY | Age: 75
End: 2020-01-01
Payer: MEDICARE

## 2020-01-01 ENCOUNTER — HOSPITAL ENCOUNTER (OUTPATIENT)
Dept: LAB | Age: 75
Discharge: HOME | End: 2020-12-28
Payer: MEDICARE

## 2020-01-01 ENCOUNTER — HOSPITAL ENCOUNTER (OUTPATIENT)
Dept: LAB | Age: 75
Discharge: HOME | End: 2020-02-05
Payer: MEDICARE

## 2020-01-01 ENCOUNTER — HOSPITAL ENCOUNTER (OUTPATIENT)
Dept: INFUSION THERAPY | Age: 75
Discharge: HOME | End: 2020-12-12
Payer: MEDICARE

## 2020-01-01 ENCOUNTER — HOSPITAL ENCOUNTER (OUTPATIENT)
Dept: INFUSION THERAPY | Age: 75
Discharge: HOME | End: 2020-10-05
Payer: MEDICARE

## 2020-01-01 ENCOUNTER — HOSPITAL ENCOUNTER (OUTPATIENT)
Dept: LAB | Age: 75
Discharge: HOME | End: 2020-03-09
Payer: MEDICARE

## 2020-01-01 ENCOUNTER — HOSPITAL ENCOUNTER (OUTPATIENT)
Dept: INFUSION THERAPY | Age: 75
Discharge: HOME | End: 2020-10-22
Payer: MEDICARE

## 2020-01-01 ENCOUNTER — HOSPITAL ENCOUNTER (OUTPATIENT)
Dept: INFUSION THERAPY | Age: 75
Discharge: HOME | End: 2020-04-23
Payer: MEDICARE

## 2020-01-01 ENCOUNTER — HOSPITAL ENCOUNTER (OUTPATIENT)
Dept: INFUSION THERAPY | Age: 75
Discharge: HOME | End: 2020-03-02
Payer: MEDICARE

## 2020-01-01 ENCOUNTER — HOSPITAL ENCOUNTER (OUTPATIENT)
Dept: INFUSION THERAPY | Age: 75
Discharge: HOME | End: 2020-11-13
Payer: MEDICARE

## 2020-01-01 ENCOUNTER — HOSPITAL ENCOUNTER (OUTPATIENT)
Dept: INFUSION THERAPY | Age: 75
Discharge: HOME | End: 2020-12-28
Payer: MEDICARE

## 2020-01-01 ENCOUNTER — HOSPITAL ENCOUNTER (OUTPATIENT)
Dept: INFUSION THERAPY | Age: 75
Discharge: HOME | End: 2020-10-01
Payer: MEDICARE

## 2020-01-01 ENCOUNTER — HOSPITAL ENCOUNTER (OUTPATIENT)
Dept: LAB | Age: 75
Discharge: HOME | End: 2020-11-19
Payer: MEDICARE

## 2020-01-01 ENCOUNTER — HOSPITAL ENCOUNTER (OUTPATIENT)
Dept: LAB | Age: 75
Discharge: HOME | End: 2020-11-30
Payer: MEDICARE

## 2020-01-01 ENCOUNTER — HOSPITAL ENCOUNTER (OUTPATIENT)
Dept: INFUSION THERAPY | Age: 75
Discharge: HOME | End: 2020-05-21
Payer: MEDICARE

## 2020-01-01 ENCOUNTER — HOSPITAL ENCOUNTER (OUTPATIENT)
Dept: INFUSION THERAPY | Age: 75
Discharge: HOME | End: 2020-10-12

## 2020-01-01 ENCOUNTER — HOSPITAL ENCOUNTER (OUTPATIENT)
Dept: LAB | Age: 75
Discharge: HOME | End: 2020-12-03
Payer: MEDICARE

## 2020-01-01 ENCOUNTER — HOSPITAL ENCOUNTER (OUTPATIENT)
Dept: INFUSION THERAPY | Age: 75
Discharge: HOME | End: 2020-08-27
Payer: MEDICARE

## 2020-01-01 ENCOUNTER — HOSPITAL ENCOUNTER (OUTPATIENT)
Dept: INFUSION THERAPY | Age: 75
Discharge: HOME | End: 2020-12-17
Payer: MEDICARE

## 2020-01-01 ENCOUNTER — HOSPITAL ENCOUNTER (OUTPATIENT)
Dept: INFUSION THERAPY | Age: 75
Discharge: HOME | End: 2020-09-29
Payer: MEDICARE

## 2020-01-01 ENCOUNTER — HOSPITAL ENCOUNTER (OUTPATIENT)
Dept: INFUSION THERAPY | Age: 75
Discharge: HOME | End: 2020-12-04
Payer: MEDICARE

## 2020-01-01 ENCOUNTER — HOSPITAL ENCOUNTER (OUTPATIENT)
Dept: LAB | Age: 75
Discharge: HOME | End: 2020-12-17
Payer: MEDICARE

## 2020-01-01 ENCOUNTER — HOSPITAL ENCOUNTER (OUTPATIENT)
Dept: INFUSION THERAPY | Age: 75
Discharge: HOME | End: 2020-06-04
Payer: MEDICARE

## 2020-01-01 ENCOUNTER — APPOINTMENT (OUTPATIENT)
Dept: CT IMAGING | Age: 75
DRG: 837 | End: 2020-01-01
Attending: INTERNAL MEDICINE
Payer: MEDICARE

## 2020-01-01 ENCOUNTER — HOSPITAL ENCOUNTER (OUTPATIENT)
Dept: INFUSION THERAPY | Age: 75
Discharge: HOME | End: 2020-09-10
Payer: MEDICARE

## 2020-01-01 ENCOUNTER — HOSPITAL ENCOUNTER (OUTPATIENT)
Dept: INFUSION THERAPY | Age: 75
Discharge: HOME | End: 2020-11-10
Payer: MEDICARE

## 2020-01-01 ENCOUNTER — HOSPITAL ENCOUNTER (OUTPATIENT)
Dept: INFUSION THERAPY | Age: 75
Discharge: HOME | End: 2020-05-08
Payer: MEDICARE

## 2020-01-01 ENCOUNTER — HOSPITAL ENCOUNTER (OUTPATIENT)
Dept: CT IMAGING | Age: 75
Discharge: HOME | End: 2020-07-24
Attending: INTERNAL MEDICINE
Payer: MEDICARE

## 2020-01-01 ENCOUNTER — HOSPITAL ENCOUNTER (OUTPATIENT)
Dept: INFUSION THERAPY | Age: 75
Discharge: HOME | End: 2020-09-21
Payer: MEDICARE

## 2020-01-01 ENCOUNTER — HOSPITAL ENCOUNTER (OUTPATIENT)
Dept: LAB | Age: 75
Discharge: HOME | DRG: 837 | End: 2020-02-10
Payer: MEDICARE

## 2020-01-01 ENCOUNTER — HOSPITAL ENCOUNTER (INPATIENT)
Age: 75
LOS: 4 days | Discharge: HOME | DRG: 837 | End: 2020-02-16
Attending: INTERNAL MEDICINE | Admitting: INTERNAL MEDICINE
Payer: MEDICARE

## 2020-01-01 ENCOUNTER — HOSPITAL ENCOUNTER (OUTPATIENT)
Dept: INFUSION THERAPY | Age: 75
Discharge: HOME | End: 2020-05-26
Payer: MEDICARE

## 2020-01-01 ENCOUNTER — HOSPITAL ENCOUNTER (OUTPATIENT)
Dept: LAB | Age: 75
Discharge: HOME | End: 2020-11-16
Payer: MEDICARE

## 2020-01-01 ENCOUNTER — HOSPITAL ENCOUNTER (OUTPATIENT)
Dept: INFUSION THERAPY | Age: 75
Discharge: HOME | End: 2020-02-24
Payer: MEDICARE

## 2020-01-01 ENCOUNTER — HOSPITAL ENCOUNTER (OUTPATIENT)
Dept: LAB | Age: 75
Discharge: HOME | End: 2020-11-23
Payer: MEDICARE

## 2020-01-01 ENCOUNTER — HOSPITAL ENCOUNTER (OUTPATIENT)
Dept: INFUSION THERAPY | Age: 75
Discharge: HOME | End: 2020-12-21
Payer: MEDICARE

## 2020-01-01 ENCOUNTER — HOSPITAL ENCOUNTER (OUTPATIENT)
Dept: INFUSION THERAPY | Age: 75
Discharge: HOME | End: 2020-11-09
Payer: MEDICARE

## 2020-01-01 ENCOUNTER — HOSPITAL ENCOUNTER (OUTPATIENT)
Dept: LAB | Age: 75
Discharge: HOME | DRG: 839 | End: 2020-04-30
Payer: MEDICARE

## 2020-01-01 ENCOUNTER — HOSPITAL ENCOUNTER (OUTPATIENT)
Dept: INFUSION THERAPY | Age: 75
Discharge: HOME | End: 2020-06-18
Payer: MEDICARE

## 2020-01-01 ENCOUNTER — HOSPITAL ENCOUNTER (OUTPATIENT)
Dept: INFUSION THERAPY | Age: 75
Discharge: HOME | End: 2020-06-10
Payer: MEDICARE

## 2020-01-01 ENCOUNTER — HOSPITAL ENCOUNTER (OUTPATIENT)
Dept: INFUSION THERAPY | Age: 75
Discharge: HOME | End: 2020-08-24
Payer: MEDICARE

## 2020-01-01 ENCOUNTER — HOSPITAL ENCOUNTER (OUTPATIENT)
Dept: INFUSION THERAPY | Age: 75
Discharge: HOME | End: 2020-09-14
Payer: MEDICARE

## 2020-01-01 ENCOUNTER — HOSPITAL ENCOUNTER (OUTPATIENT)
Dept: INFUSION THERAPY | Age: 75
Discharge: HOME | End: 2020-12-31
Payer: MEDICARE

## 2020-01-01 ENCOUNTER — HOSPITAL ENCOUNTER (OUTPATIENT)
Dept: INFUSION THERAPY | Age: 75
Discharge: HOME | End: 2020-07-27
Payer: MEDICARE

## 2020-01-01 ENCOUNTER — HOSPITAL ENCOUNTER (OUTPATIENT)
Dept: INFUSION THERAPY | Age: 75
Discharge: HOME | End: 2020-04-02
Payer: MEDICARE

## 2020-01-01 ENCOUNTER — HOSPITAL ENCOUNTER (OUTPATIENT)
Dept: INFUSION THERAPY | Age: 75
Discharge: HOME | End: 2020-01-30
Payer: MEDICARE

## 2020-01-01 ENCOUNTER — HOSPITAL ENCOUNTER (OUTPATIENT)
Dept: LAB | Age: 75
Discharge: HOME | End: 2020-11-05
Payer: MEDICARE

## 2020-01-01 ENCOUNTER — HOSPITAL ENCOUNTER (OUTPATIENT)
Dept: LAB | Age: 75
Discharge: HOME | End: 2020-04-02
Payer: MEDICARE

## 2020-01-01 ENCOUNTER — HOSPITAL ENCOUNTER (OUTPATIENT)
Dept: LAB | Age: 75
Discharge: HOME | End: 2020-08-10
Payer: MEDICARE

## 2020-01-01 ENCOUNTER — HOSPITAL ENCOUNTER (OUTPATIENT)
Dept: LAB | Age: 75
Discharge: HOME | DRG: 837 | End: 2020-03-20
Payer: MEDICARE

## 2020-01-01 ENCOUNTER — HOSPITAL ENCOUNTER (OUTPATIENT)
Dept: CT IMAGING | Age: 75
Discharge: HOME | End: 2020-05-28
Attending: INTERNAL MEDICINE
Payer: MEDICARE

## 2020-01-01 ENCOUNTER — HOSPITAL ENCOUNTER (OUTPATIENT)
Dept: INFUSION THERAPY | Age: 75
Discharge: HOME | End: 2020-02-18
Payer: MEDICARE

## 2020-01-01 ENCOUNTER — HOSPITAL ENCOUNTER (OUTPATIENT)
Dept: INFUSION THERAPY | Age: 75
Discharge: HOME | End: 2020-05-18
Payer: MEDICARE

## 2020-01-01 ENCOUNTER — HOSPITAL ENCOUNTER (OUTPATIENT)
Dept: INFUSION THERAPY | Age: 75
Discharge: HOME | End: 2020-10-08
Payer: MEDICARE

## 2020-01-01 ENCOUNTER — HOSPITAL ENCOUNTER (INPATIENT)
Age: 75
LOS: 5 days | Discharge: HOME | DRG: 837 | End: 2020-03-28
Attending: INTERNAL MEDICINE | Admitting: INTERNAL MEDICINE
Payer: MEDICARE

## 2020-01-01 ENCOUNTER — HOSPITAL ENCOUNTER (OUTPATIENT)
Dept: INFUSION THERAPY | Age: 75
Discharge: HOME | End: 2020-07-24
Payer: MEDICARE

## 2020-01-01 ENCOUNTER — HOSPITAL ENCOUNTER (OUTPATIENT)
Dept: LAB | Age: 75
Discharge: HOME | End: 2020-04-14
Payer: MEDICARE

## 2020-01-01 ENCOUNTER — HOSPITAL ENCOUNTER (OUTPATIENT)
Dept: INFUSION THERAPY | Age: 75
Discharge: HOME | End: 2020-09-07
Payer: MEDICARE

## 2020-01-01 ENCOUNTER — HOSPITAL ENCOUNTER (OUTPATIENT)
Dept: LAB | Age: 75
Discharge: HOME | End: 2020-01-24
Payer: MEDICARE

## 2020-01-01 ENCOUNTER — HOSPITAL ENCOUNTER (OUTPATIENT)
Dept: INFUSION THERAPY | Age: 75
Discharge: HOME | End: 2020-06-11
Payer: MEDICARE

## 2020-01-01 ENCOUNTER — HOSPITAL ENCOUNTER (OUTPATIENT)
Dept: INFUSION THERAPY | Age: 75
Discharge: HOME | End: 2020-08-31
Payer: MEDICARE

## 2020-01-01 ENCOUNTER — HOSPITAL ENCOUNTER (OUTPATIENT)
Dept: LAB | Age: 75
Discharge: HOME | End: 2020-12-10
Payer: MEDICARE

## 2020-01-01 ENCOUNTER — HOSPITAL ENCOUNTER (OUTPATIENT)
Dept: INFUSION THERAPY | Age: 75
Discharge: HOME | End: 2020-11-12
Payer: MEDICARE

## 2020-01-01 ENCOUNTER — HOSPITAL ENCOUNTER (OUTPATIENT)
Dept: LAB | Age: 75
Discharge: HOME | End: 2020-04-09
Payer: MEDICARE

## 2020-01-01 ENCOUNTER — HOSPITAL ENCOUNTER (OUTPATIENT)
Dept: INFUSION THERAPY | Age: 75
Discharge: HOME | End: 2020-03-05
Payer: MEDICARE

## 2020-01-01 ENCOUNTER — HOSPITAL ENCOUNTER (OUTPATIENT)
Dept: INFUSION THERAPY | Age: 75
Discharge: HOME | End: 2020-04-05
Payer: MEDICARE

## 2020-01-01 ENCOUNTER — HOSPITAL ENCOUNTER (INPATIENT)
Age: 75
LOS: 3 days | Discharge: HOME | DRG: 809 | End: 2020-12-24
Attending: INTERNAL MEDICINE | Admitting: INTERNAL MEDICINE
Payer: MEDICARE

## 2020-01-01 ENCOUNTER — HOSPITAL ENCOUNTER (OUTPATIENT)
Dept: INFUSION THERAPY | Age: 75
Discharge: HOME | End: 2020-09-28
Payer: MEDICARE

## 2020-01-01 ENCOUNTER — HOSPITAL ENCOUNTER (OUTPATIENT)
Dept: INFUSION THERAPY | Age: 75
Discharge: HOME | End: 2020-01-27
Payer: MEDICARE

## 2020-01-01 ENCOUNTER — HOSPITAL ENCOUNTER (OUTPATIENT)
Dept: INFUSION THERAPY | Age: 75
Discharge: HOME | End: 2020-07-13
Payer: MEDICARE

## 2020-01-01 ENCOUNTER — HOSPITAL ENCOUNTER (OUTPATIENT)
Dept: INFUSION THERAPY | Age: 75
Discharge: HOME | End: 2020-08-19
Payer: MEDICARE

## 2020-01-01 ENCOUNTER — HOSPITAL ENCOUNTER (OUTPATIENT)
Dept: INFUSION THERAPY | Age: 75
Discharge: HOME | End: 2020-08-21
Payer: MEDICARE

## 2020-01-01 ENCOUNTER — HOSPITAL ENCOUNTER (OUTPATIENT)
Dept: INFUSION THERAPY | Age: 75
Discharge: HOME | End: 2020-03-13
Payer: MEDICARE

## 2020-01-01 ENCOUNTER — HOSPITAL ENCOUNTER (OUTPATIENT)
Dept: LAB | Age: 75
Discharge: HOME | End: 2020-02-07
Payer: MEDICARE

## 2020-01-01 ENCOUNTER — HOSPITAL ENCOUNTER (OUTPATIENT)
Dept: INFUSION THERAPY | Age: 75
Discharge: HOME | End: 2020-01-20
Payer: MEDICARE

## 2020-01-01 ENCOUNTER — HOSPITAL ENCOUNTER (OUTPATIENT)
Dept: INFUSION THERAPY | Age: 75
Discharge: HOME | End: 2020-06-09
Payer: MEDICARE

## 2020-01-01 ENCOUNTER — HOSPITAL ENCOUNTER (OUTPATIENT)
Dept: INFUSION THERAPY | Age: 75
Discharge: HOME | End: 2020-04-17
Payer: MEDICARE

## 2020-01-01 ENCOUNTER — HOSPITAL ENCOUNTER (OUTPATIENT)
Dept: INFUSION THERAPY | Age: 75
Discharge: HOME | End: 2020-11-26
Payer: MEDICARE

## 2020-01-01 ENCOUNTER — HOSPITAL ENCOUNTER (OUTPATIENT)
Dept: INFUSION THERAPY | Age: 75
Discharge: HOME | End: 2020-06-12
Payer: MEDICARE

## 2020-01-01 ENCOUNTER — HOSPITAL ENCOUNTER (OUTPATIENT)
Dept: INFUSION THERAPY | Age: 75
Discharge: HOME | End: 2020-06-29
Payer: MEDICARE

## 2020-01-01 ENCOUNTER — HOSPITAL ENCOUNTER (OUTPATIENT)
Dept: INFUSION THERAPY | Age: 75
Discharge: HOME | End: 2020-03-30
Payer: MEDICARE

## 2020-01-01 ENCOUNTER — HOSPITAL ENCOUNTER (OUTPATIENT)
Dept: INFUSION THERAPY | Age: 75
Discharge: HOME | End: 2020-10-02
Payer: MEDICARE

## 2020-01-01 ENCOUNTER — HOSPITAL ENCOUNTER (OUTPATIENT)
Dept: INFUSION THERAPY | Age: 75
Discharge: HOME | End: 2020-12-07

## 2020-01-01 ENCOUNTER — HOSPITAL ENCOUNTER (OUTPATIENT)
Dept: INFUSION THERAPY | Age: 75
Discharge: HOME | End: 2020-06-08
Payer: MEDICARE

## 2020-01-01 ENCOUNTER — HOSPITAL ENCOUNTER (OUTPATIENT)
Dept: LAB | Age: 75
Discharge: HOME | DRG: 809 | End: 2020-12-21
Payer: MEDICARE

## 2020-01-01 ENCOUNTER — HOSPITAL ENCOUNTER (OUTPATIENT)
Dept: LAB | Age: 75
Discharge: HOME | End: 2020-12-11
Payer: MEDICARE

## 2020-01-01 ENCOUNTER — HOSPITAL ENCOUNTER (OUTPATIENT)
Dept: INFUSION THERAPY | Age: 75
Discharge: HOME | End: 2020-02-20
Payer: MEDICARE

## 2020-01-01 ENCOUNTER — HOSPITAL ENCOUNTER (OUTPATIENT)
Dept: INFUSION THERAPY | Age: 75
Discharge: HOME | End: 2020-04-09
Payer: MEDICARE

## 2020-01-01 ENCOUNTER — HOSPITAL ENCOUNTER (OUTPATIENT)
Dept: INFUSION THERAPY | Age: 75
Discharge: HOME | End: 2020-08-03
Payer: MEDICARE

## 2020-01-01 ENCOUNTER — HOSPITAL ENCOUNTER (OUTPATIENT)
Dept: INFUSION THERAPY | Age: 75
Discharge: HOME | End: 2020-10-26
Payer: MEDICARE

## 2020-01-01 ENCOUNTER — HOSPITAL ENCOUNTER (OUTPATIENT)
Dept: INFUSION THERAPY | Age: 75
Discharge: HOME | End: 2020-02-03
Payer: MEDICARE

## 2020-01-01 ENCOUNTER — HOSPITAL ENCOUNTER (OUTPATIENT)
Dept: INFUSION THERAPY | Age: 75
Discharge: HOME | End: 2020-10-19
Payer: MEDICARE

## 2020-01-01 ENCOUNTER — HOSPITAL ENCOUNTER (OUTPATIENT)
Dept: INFUSION THERAPY | Age: 75
Discharge: HOME | End: 2020-08-17
Payer: MEDICARE

## 2020-01-01 ENCOUNTER — HOSPITAL ENCOUNTER (OUTPATIENT)
Dept: INFUSION THERAPY | Age: 75
Discharge: HOME | End: 2020-07-06
Payer: MEDICARE

## 2020-01-01 ENCOUNTER — HOSPITAL ENCOUNTER (OUTPATIENT)
Dept: INFUSION THERAPY | Age: 75
Discharge: HOME | End: 2020-05-11
Payer: MEDICARE

## 2020-01-01 ENCOUNTER — HOSPITAL ENCOUNTER (OUTPATIENT)
Dept: INFUSION THERAPY | Age: 75
Discharge: HOME | End: 2020-07-20
Payer: MEDICARE

## 2020-01-01 ENCOUNTER — HOSPITAL ENCOUNTER (OUTPATIENT)
Dept: INFUSION THERAPY | Age: 75
Discharge: HOME | End: 2020-10-29
Payer: MEDICARE

## 2020-01-01 ENCOUNTER — HOSPITAL ENCOUNTER (OUTPATIENT)
Dept: INFUSION THERAPY | Age: 75
Discharge: HOME | End: 2020-01-25
Payer: MEDICARE

## 2020-01-01 ENCOUNTER — HOSPITAL ENCOUNTER (OUTPATIENT)
Dept: LAB | Age: 75
Discharge: HOME | End: 2020-02-27
Payer: MEDICARE

## 2020-01-01 ENCOUNTER — HOSPITAL ENCOUNTER (OUTPATIENT)
Dept: INFUSION THERAPY | Age: 75
Discharge: HOME | End: 2020-05-14
Payer: MEDICARE

## 2020-01-01 VITALS
DIASTOLIC BLOOD PRESSURE: 66 MMHG | OXYGEN SATURATION: 98 % | HEART RATE: 82 BPM | SYSTOLIC BLOOD PRESSURE: 113 MMHG | TEMPERATURE: 97.5 F | RESPIRATION RATE: 20 BRPM

## 2020-01-01 VITALS
RESPIRATION RATE: 18 BRPM | SYSTOLIC BLOOD PRESSURE: 143 MMHG | BODY MASS INDEX: 30.83 KG/M2 | OXYGEN SATURATION: 98 % | TEMPERATURE: 98.4 F | DIASTOLIC BLOOD PRESSURE: 59 MMHG | WEIGHT: 191 LBS

## 2020-01-01 VITALS
DIASTOLIC BLOOD PRESSURE: 58 MMHG | WEIGHT: 179.2 LBS | RESPIRATION RATE: 18 BRPM | SYSTOLIC BLOOD PRESSURE: 149 MMHG | HEART RATE: 105 BPM | BODY MASS INDEX: 29.37 KG/M2 | TEMPERATURE: 98.6 F | OXYGEN SATURATION: 100 %

## 2020-01-01 VITALS
OXYGEN SATURATION: 98 % | SYSTOLIC BLOOD PRESSURE: 164 MMHG | OXYGEN SATURATION: 100 % | HEART RATE: 96 BPM | TEMPERATURE: 96.9 F | TEMPERATURE: 96.9 F | DIASTOLIC BLOOD PRESSURE: 69 MMHG | SYSTOLIC BLOOD PRESSURE: 142 MMHG | HEART RATE: 89 BPM | DIASTOLIC BLOOD PRESSURE: 51 MMHG | RESPIRATION RATE: 18 BRPM | RESPIRATION RATE: 18 BRPM

## 2020-01-01 VITALS
OXYGEN SATURATION: 95 % | RESPIRATION RATE: 18 BRPM | DIASTOLIC BLOOD PRESSURE: 64 MMHG | BODY MASS INDEX: 30.96 KG/M2 | TEMPERATURE: 98.7 F | WEIGHT: 191.8 LBS | TEMPERATURE: 97.8 F | DIASTOLIC BLOOD PRESSURE: 74 MMHG | SYSTOLIC BLOOD PRESSURE: 145 MMHG | HEART RATE: 108 BPM | HEART RATE: 79 BPM | OXYGEN SATURATION: 98 % | SYSTOLIC BLOOD PRESSURE: 141 MMHG | RESPIRATION RATE: 18 BRPM

## 2020-01-01 VITALS
RESPIRATION RATE: 16 BRPM | BODY MASS INDEX: 33.91 KG/M2 | OXYGEN SATURATION: 96 % | HEART RATE: 76 BPM | DIASTOLIC BLOOD PRESSURE: 66 MMHG | SYSTOLIC BLOOD PRESSURE: 131 MMHG | WEIGHT: 203.8 LBS | TEMPERATURE: 98.4 F

## 2020-01-01 VITALS
OXYGEN SATURATION: 96 % | WEIGHT: 196 LBS | HEART RATE: 98 BPM | RESPIRATION RATE: 18 BRPM | DIASTOLIC BLOOD PRESSURE: 68 MMHG | SYSTOLIC BLOOD PRESSURE: 142 MMHG | TEMPERATURE: 97.1 F | BODY MASS INDEX: 31.64 KG/M2

## 2020-01-01 VITALS
RESPIRATION RATE: 16 BRPM | HEART RATE: 72 BPM | TEMPERATURE: 98.1 F | OXYGEN SATURATION: 96 % | SYSTOLIC BLOOD PRESSURE: 128 MMHG | DIASTOLIC BLOOD PRESSURE: 78 MMHG

## 2020-01-01 VITALS
TEMPERATURE: 97.3 F | BODY MASS INDEX: 33.45 KG/M2 | RESPIRATION RATE: 16 BRPM | SYSTOLIC BLOOD PRESSURE: 144 MMHG | HEART RATE: 81 BPM | DIASTOLIC BLOOD PRESSURE: 72 MMHG | WEIGHT: 201 LBS | OXYGEN SATURATION: 95 %

## 2020-01-01 VITALS
TEMPERATURE: 96.8 F | SYSTOLIC BLOOD PRESSURE: 157 MMHG | RESPIRATION RATE: 18 BRPM | OXYGEN SATURATION: 95 % | HEART RATE: 100 BPM | DIASTOLIC BLOOD PRESSURE: 76 MMHG

## 2020-01-01 VITALS
DIASTOLIC BLOOD PRESSURE: 77 MMHG | DIASTOLIC BLOOD PRESSURE: 49 MMHG | HEART RATE: 97 BPM | TEMPERATURE: 97.8 F | OXYGEN SATURATION: 96 % | HEART RATE: 77 BPM | TEMPERATURE: 98 F | RESPIRATION RATE: 18 BRPM | SYSTOLIC BLOOD PRESSURE: 138 MMHG | RESPIRATION RATE: 18 BRPM | SYSTOLIC BLOOD PRESSURE: 134 MMHG | OXYGEN SATURATION: 98 %

## 2020-01-01 VITALS
SYSTOLIC BLOOD PRESSURE: 126 MMHG | WEIGHT: 182.4 LBS | OXYGEN SATURATION: 100 % | BODY MASS INDEX: 29.89 KG/M2 | RESPIRATION RATE: 18 BRPM | HEART RATE: 82 BPM | DIASTOLIC BLOOD PRESSURE: 64 MMHG | TEMPERATURE: 98.8 F

## 2020-01-01 VITALS
WEIGHT: 202.4 LBS | OXYGEN SATURATION: 95 % | HEART RATE: 96 BPM | BODY MASS INDEX: 33.68 KG/M2 | DIASTOLIC BLOOD PRESSURE: 57 MMHG | RESPIRATION RATE: 18 BRPM | SYSTOLIC BLOOD PRESSURE: 146 MMHG | TEMPERATURE: 98.1 F

## 2020-01-01 VITALS
WEIGHT: 195 LBS | DIASTOLIC BLOOD PRESSURE: 61 MMHG | TEMPERATURE: 97.7 F | TEMPERATURE: 98 F | SYSTOLIC BLOOD PRESSURE: 112 MMHG | OXYGEN SATURATION: 98 % | RESPIRATION RATE: 18 BRPM | DIASTOLIC BLOOD PRESSURE: 69 MMHG | OXYGEN SATURATION: 98 % | SYSTOLIC BLOOD PRESSURE: 123 MMHG | HEART RATE: 101 BPM | RESPIRATION RATE: 18 BRPM | HEART RATE: 105 BPM | BODY MASS INDEX: 31.47 KG/M2

## 2020-01-01 VITALS
OXYGEN SATURATION: 100 % | RESPIRATION RATE: 18 BRPM | TEMPERATURE: 97.7 F | SYSTOLIC BLOOD PRESSURE: 155 MMHG | DIASTOLIC BLOOD PRESSURE: 75 MMHG | BODY MASS INDEX: 29.47 KG/M2 | WEIGHT: 179.8 LBS | HEART RATE: 102 BPM

## 2020-01-01 VITALS
BODY MASS INDEX: 30.51 KG/M2 | DIASTOLIC BLOOD PRESSURE: 63 MMHG | RESPIRATION RATE: 16 BRPM | SYSTOLIC BLOOD PRESSURE: 131 MMHG | TEMPERATURE: 98.5 F | OXYGEN SATURATION: 98 % | WEIGHT: 186.19 LBS | HEART RATE: 67 BPM

## 2020-01-01 VITALS
RESPIRATION RATE: 14 BRPM | BODY MASS INDEX: 29.25 KG/M2 | HEART RATE: 86 BPM | WEIGHT: 178.5 LBS | DIASTOLIC BLOOD PRESSURE: 63 MMHG | OXYGEN SATURATION: 94 % | SYSTOLIC BLOOD PRESSURE: 136 MMHG | TEMPERATURE: 98.3 F

## 2020-01-01 VITALS
BODY MASS INDEX: 30.89 KG/M2 | SYSTOLIC BLOOD PRESSURE: 133 MMHG | TEMPERATURE: 98.7 F | HEART RATE: 104 BPM | RESPIRATION RATE: 18 BRPM | OXYGEN SATURATION: 97 % | DIASTOLIC BLOOD PRESSURE: 57 MMHG | WEIGHT: 191.4 LBS

## 2020-01-01 VITALS
OXYGEN SATURATION: 96 % | TEMPERATURE: 98.4 F | HEART RATE: 92 BPM | DIASTOLIC BLOOD PRESSURE: 72 MMHG | RESPIRATION RATE: 18 BRPM | SYSTOLIC BLOOD PRESSURE: 130 MMHG | WEIGHT: 189.8 LBS | BODY MASS INDEX: 30.63 KG/M2

## 2020-01-01 VITALS
WEIGHT: 183.8 LBS | SYSTOLIC BLOOD PRESSURE: 134 MMHG | RESPIRATION RATE: 18 BRPM | HEART RATE: 116 BPM | TEMPERATURE: 98.5 F | OXYGEN SATURATION: 100 % | BODY MASS INDEX: 29.67 KG/M2 | DIASTOLIC BLOOD PRESSURE: 88 MMHG

## 2020-01-01 VITALS
RESPIRATION RATE: 18 BRPM | OXYGEN SATURATION: 100 % | TEMPERATURE: 98.1 F | BODY MASS INDEX: 30.22 KG/M2 | WEIGHT: 188 LBS | HEART RATE: 97 BPM | DIASTOLIC BLOOD PRESSURE: 61 MMHG | HEIGHT: 66 IN | SYSTOLIC BLOOD PRESSURE: 156 MMHG

## 2020-01-01 VITALS
HEIGHT: 65 IN | OXYGEN SATURATION: 98 % | HEART RATE: 56 BPM | TEMPERATURE: 98.3 F | RESPIRATION RATE: 16 BRPM | BODY MASS INDEX: 34.32 KG/M2 | DIASTOLIC BLOOD PRESSURE: 63 MMHG | SYSTOLIC BLOOD PRESSURE: 132 MMHG | WEIGHT: 206 LBS

## 2020-01-01 VITALS
TEMPERATURE: 98.6 F | SYSTOLIC BLOOD PRESSURE: 132 MMHG | BODY MASS INDEX: 29.7 KG/M2 | OXYGEN SATURATION: 94 % | HEART RATE: 76 BPM | WEIGHT: 184 LBS | RESPIRATION RATE: 18 BRPM | DIASTOLIC BLOOD PRESSURE: 60 MMHG

## 2020-01-01 VITALS
TEMPERATURE: 97.6 F | SYSTOLIC BLOOD PRESSURE: 142 MMHG | DIASTOLIC BLOOD PRESSURE: 80 MMHG | OXYGEN SATURATION: 96 % | RESPIRATION RATE: 18 BRPM | HEART RATE: 79 BPM

## 2020-01-01 VITALS
RESPIRATION RATE: 18 BRPM | HEART RATE: 111 BPM | WEIGHT: 185.8 LBS | SYSTOLIC BLOOD PRESSURE: 106 MMHG | BODY MASS INDEX: 29.99 KG/M2 | OXYGEN SATURATION: 98 % | TEMPERATURE: 98.7 F | DIASTOLIC BLOOD PRESSURE: 58 MMHG

## 2020-01-01 VITALS
SYSTOLIC BLOOD PRESSURE: 148 MMHG | RESPIRATION RATE: 16 BRPM | OXYGEN SATURATION: 93 % | BODY MASS INDEX: 30.05 KG/M2 | DIASTOLIC BLOOD PRESSURE: 63 MMHG | HEART RATE: 71 BPM | TEMPERATURE: 98.9 F | HEIGHT: 66 IN | WEIGHT: 187 LBS

## 2020-01-01 VITALS
OXYGEN SATURATION: 100 % | BODY MASS INDEX: 29.43 KG/M2 | RESPIRATION RATE: 18 BRPM | TEMPERATURE: 98.8 F | SYSTOLIC BLOOD PRESSURE: 122 MMHG | WEIGHT: 179.6 LBS | HEART RATE: 107 BPM | DIASTOLIC BLOOD PRESSURE: 69 MMHG

## 2020-01-01 VITALS
HEART RATE: 62 BPM | OXYGEN SATURATION: 95 % | DIASTOLIC BLOOD PRESSURE: 67 MMHG | RESPIRATION RATE: 18 BRPM | TEMPERATURE: 98.1 F | SYSTOLIC BLOOD PRESSURE: 127 MMHG | BODY MASS INDEX: 30.67 KG/M2 | WEIGHT: 190 LBS

## 2020-01-01 VITALS
SYSTOLIC BLOOD PRESSURE: 142 MMHG | BODY MASS INDEX: 30.47 KG/M2 | TEMPERATURE: 99.1 F | DIASTOLIC BLOOD PRESSURE: 69 MMHG | OXYGEN SATURATION: 96 % | RESPIRATION RATE: 18 BRPM | HEART RATE: 91 BPM | WEIGHT: 188.8 LBS

## 2020-01-01 VITALS
HEART RATE: 115 BPM | SYSTOLIC BLOOD PRESSURE: 146 MMHG | TEMPERATURE: 97.1 F | BODY MASS INDEX: 30.99 KG/M2 | WEIGHT: 192 LBS | DIASTOLIC BLOOD PRESSURE: 71 MMHG | OXYGEN SATURATION: 98 % | RESPIRATION RATE: 18 BRPM

## 2020-01-01 VITALS
WEIGHT: 194.6 LBS | SYSTOLIC BLOOD PRESSURE: 138 MMHG | OXYGEN SATURATION: 97 % | TEMPERATURE: 97.5 F | HEART RATE: 93 BPM | BODY MASS INDEX: 31.41 KG/M2 | RESPIRATION RATE: 18 BRPM | DIASTOLIC BLOOD PRESSURE: 50 MMHG

## 2020-01-01 VITALS
OXYGEN SATURATION: 98 % | BODY MASS INDEX: 33.71 KG/M2 | TEMPERATURE: 97.7 F | WEIGHT: 202.6 LBS | DIASTOLIC BLOOD PRESSURE: 76 MMHG | HEART RATE: 90 BPM | SYSTOLIC BLOOD PRESSURE: 148 MMHG | RESPIRATION RATE: 16 BRPM

## 2020-01-01 VITALS
SYSTOLIC BLOOD PRESSURE: 132 MMHG | OXYGEN SATURATION: 96 % | RESPIRATION RATE: 18 BRPM | TEMPERATURE: 97.9 F | HEART RATE: 89 BPM | DIASTOLIC BLOOD PRESSURE: 65 MMHG

## 2020-01-01 VITALS
DIASTOLIC BLOOD PRESSURE: 72 MMHG | RESPIRATION RATE: 18 BRPM | OXYGEN SATURATION: 98 % | SYSTOLIC BLOOD PRESSURE: 130 MMHG | WEIGHT: 198 LBS | TEMPERATURE: 98.1 F | BODY MASS INDEX: 31.96 KG/M2 | HEART RATE: 75 BPM

## 2020-01-01 VITALS
SYSTOLIC BLOOD PRESSURE: 134 MMHG | RESPIRATION RATE: 18 BRPM | OXYGEN SATURATION: 99 % | DIASTOLIC BLOOD PRESSURE: 58 MMHG | TEMPERATURE: 98.3 F | HEART RATE: 101 BPM

## 2020-01-01 VITALS — BODY MASS INDEX: 31.22 KG/M2 | HEIGHT: 66 IN

## 2020-01-01 VITALS
WEIGHT: 193 LBS | RESPIRATION RATE: 18 BRPM | OXYGEN SATURATION: 98 % | HEART RATE: 80 BPM | DIASTOLIC BLOOD PRESSURE: 62 MMHG | SYSTOLIC BLOOD PRESSURE: 138 MMHG | TEMPERATURE: 97 F | BODY MASS INDEX: 31.15 KG/M2

## 2020-01-01 VITALS
RESPIRATION RATE: 18 BRPM | TEMPERATURE: 97.9 F | HEART RATE: 77 BPM | SYSTOLIC BLOOD PRESSURE: 130 MMHG | DIASTOLIC BLOOD PRESSURE: 70 MMHG | OXYGEN SATURATION: 97 %

## 2020-01-01 VITALS
TEMPERATURE: 98.3 F | DIASTOLIC BLOOD PRESSURE: 60 MMHG | RESPIRATION RATE: 18 BRPM | OXYGEN SATURATION: 97 % | SYSTOLIC BLOOD PRESSURE: 133 MMHG | HEART RATE: 76 BPM

## 2020-01-01 VITALS
TEMPERATURE: 96.7 F | DIASTOLIC BLOOD PRESSURE: 88 MMHG | OXYGEN SATURATION: 98 % | SYSTOLIC BLOOD PRESSURE: 145 MMHG | RESPIRATION RATE: 18 BRPM | HEART RATE: 84 BPM

## 2020-01-01 VITALS
SYSTOLIC BLOOD PRESSURE: 137 MMHG | OXYGEN SATURATION: 95 % | BODY MASS INDEX: 30.99 KG/M2 | DIASTOLIC BLOOD PRESSURE: 59 MMHG | RESPIRATION RATE: 18 BRPM | HEART RATE: 77 BPM | WEIGHT: 192 LBS | TEMPERATURE: 99.4 F

## 2020-01-01 VITALS
TEMPERATURE: 98.6 F | DIASTOLIC BLOOD PRESSURE: 66 MMHG | HEART RATE: 82 BPM | OXYGEN SATURATION: 98 % | WEIGHT: 184.4 LBS | SYSTOLIC BLOOD PRESSURE: 130 MMHG | RESPIRATION RATE: 18 BRPM | BODY MASS INDEX: 29.76 KG/M2

## 2020-01-01 VITALS
RESPIRATION RATE: 19 BRPM | TEMPERATURE: 98.4 F | HEART RATE: 77 BPM | SYSTOLIC BLOOD PRESSURE: 135 MMHG | OXYGEN SATURATION: 94 % | DIASTOLIC BLOOD PRESSURE: 69 MMHG

## 2020-01-01 VITALS
DIASTOLIC BLOOD PRESSURE: 79 MMHG | WEIGHT: 191 LBS | TEMPERATURE: 98.8 F | TEMPERATURE: 97.3 F | BODY MASS INDEX: 31.8 KG/M2 | RESPIRATION RATE: 18 BRPM | WEIGHT: 197 LBS | DIASTOLIC BLOOD PRESSURE: 74 MMHG | HEART RATE: 76 BPM | OXYGEN SATURATION: 95 % | RESPIRATION RATE: 18 BRPM | SYSTOLIC BLOOD PRESSURE: 154 MMHG | SYSTOLIC BLOOD PRESSURE: 150 MMHG | HEART RATE: 75 BPM | BODY MASS INDEX: 30.83 KG/M2 | OXYGEN SATURATION: 97 %

## 2020-01-01 VITALS
WEIGHT: 196.8 LBS | HEART RATE: 98 BPM | BODY MASS INDEX: 31.76 KG/M2 | SYSTOLIC BLOOD PRESSURE: 162 MMHG | DIASTOLIC BLOOD PRESSURE: 87 MMHG | TEMPERATURE: 96.8 F | RESPIRATION RATE: 18 BRPM | OXYGEN SATURATION: 96 %

## 2020-01-01 VITALS
RESPIRATION RATE: 18 BRPM | DIASTOLIC BLOOD PRESSURE: 64 MMHG | HEART RATE: 85 BPM | OXYGEN SATURATION: 96 % | TEMPERATURE: 97.3 F | SYSTOLIC BLOOD PRESSURE: 131 MMHG

## 2020-01-01 VITALS
RESPIRATION RATE: 18 BRPM | HEART RATE: 70 BPM | BODY MASS INDEX: 31.73 KG/M2 | SYSTOLIC BLOOD PRESSURE: 161 MMHG | OXYGEN SATURATION: 96 % | DIASTOLIC BLOOD PRESSURE: 93 MMHG | TEMPERATURE: 97.1 F | WEIGHT: 196.6 LBS

## 2020-01-01 VITALS
BODY MASS INDEX: 31.8 KG/M2 | OXYGEN SATURATION: 96 % | SYSTOLIC BLOOD PRESSURE: 142 MMHG | HEART RATE: 78 BPM | WEIGHT: 197 LBS | DIASTOLIC BLOOD PRESSURE: 82 MMHG | TEMPERATURE: 97.7 F | RESPIRATION RATE: 18 BRPM

## 2020-01-01 VITALS
OXYGEN SATURATION: 96 % | TEMPERATURE: 97.9 F | DIASTOLIC BLOOD PRESSURE: 64 MMHG | RESPIRATION RATE: 20 BRPM | HEART RATE: 90 BPM | SYSTOLIC BLOOD PRESSURE: 128 MMHG

## 2020-01-01 VITALS
RESPIRATION RATE: 18 BRPM | DIASTOLIC BLOOD PRESSURE: 77 MMHG | WEIGHT: 183 LBS | SYSTOLIC BLOOD PRESSURE: 145 MMHG | HEART RATE: 75 BPM | BODY MASS INDEX: 29.99 KG/M2 | TEMPERATURE: 97.9 F | OXYGEN SATURATION: 97 %

## 2020-01-01 VITALS
HEART RATE: 108 BPM | RESPIRATION RATE: 18 BRPM | BODY MASS INDEX: 31.12 KG/M2 | SYSTOLIC BLOOD PRESSURE: 132 MMHG | TEMPERATURE: 97.6 F | OXYGEN SATURATION: 97 % | DIASTOLIC BLOOD PRESSURE: 61 MMHG | WEIGHT: 192.8 LBS

## 2020-01-01 VITALS
DIASTOLIC BLOOD PRESSURE: 60 MMHG | OXYGEN SATURATION: 94 % | RESPIRATION RATE: 16 BRPM | HEART RATE: 84 BPM | SYSTOLIC BLOOD PRESSURE: 125 MMHG | TEMPERATURE: 98.6 F

## 2020-01-01 VITALS
OXYGEN SATURATION: 97 % | TEMPERATURE: 97 F | HEART RATE: 93 BPM | DIASTOLIC BLOOD PRESSURE: 60 MMHG | SYSTOLIC BLOOD PRESSURE: 150 MMHG | RESPIRATION RATE: 18 BRPM

## 2020-01-01 VITALS
TEMPERATURE: 98.5 F | HEART RATE: 70 BPM | SYSTOLIC BLOOD PRESSURE: 134 MMHG | RESPIRATION RATE: 16 BRPM | DIASTOLIC BLOOD PRESSURE: 71 MMHG | OXYGEN SATURATION: 99 %

## 2020-01-01 VITALS
OXYGEN SATURATION: 95 % | SYSTOLIC BLOOD PRESSURE: 153 MMHG | HEART RATE: 81 BPM | TEMPERATURE: 97.2 F | RESPIRATION RATE: 18 BRPM | WEIGHT: 193.2 LBS | BODY MASS INDEX: 31.18 KG/M2 | DIASTOLIC BLOOD PRESSURE: 71 MMHG

## 2020-01-01 VITALS
HEART RATE: 116 BPM | BODY MASS INDEX: 30.34 KG/M2 | OXYGEN SATURATION: 97 % | OXYGEN SATURATION: 95 % | SYSTOLIC BLOOD PRESSURE: 106 MMHG | DIASTOLIC BLOOD PRESSURE: 56 MMHG | RESPIRATION RATE: 16 BRPM | HEART RATE: 98 BPM | TEMPERATURE: 99 F | SYSTOLIC BLOOD PRESSURE: 137 MMHG | TEMPERATURE: 98.3 F | DIASTOLIC BLOOD PRESSURE: 72 MMHG | WEIGHT: 188 LBS

## 2020-01-01 VITALS
BODY MASS INDEX: 32.47 KG/M2 | DIASTOLIC BLOOD PRESSURE: 83 MMHG | WEIGHT: 202 LBS | OXYGEN SATURATION: 97 % | HEART RATE: 65 BPM | SYSTOLIC BLOOD PRESSURE: 137 MMHG | TEMPERATURE: 96.9 F | HEIGHT: 66 IN | RESPIRATION RATE: 18 BRPM

## 2020-01-01 VITALS
SYSTOLIC BLOOD PRESSURE: 120 MMHG | DIASTOLIC BLOOD PRESSURE: 65 MMHG | HEART RATE: 78 BPM | RESPIRATION RATE: 18 BRPM | TEMPERATURE: 98.6 F | OXYGEN SATURATION: 98 %

## 2020-01-01 VITALS
WEIGHT: 208.2 LBS | RESPIRATION RATE: 16 BRPM | DIASTOLIC BLOOD PRESSURE: 62 MMHG | BODY MASS INDEX: 34.65 KG/M2 | TEMPERATURE: 98.6 F | OXYGEN SATURATION: 98 % | SYSTOLIC BLOOD PRESSURE: 139 MMHG | HEART RATE: 66 BPM

## 2020-01-01 VITALS
RESPIRATION RATE: 18 BRPM | DIASTOLIC BLOOD PRESSURE: 81 MMHG | TEMPERATURE: 97.6 F | HEART RATE: 81 BPM | SYSTOLIC BLOOD PRESSURE: 147 MMHG | OXYGEN SATURATION: 100 %

## 2020-01-01 VITALS
DIASTOLIC BLOOD PRESSURE: 57 MMHG | RESPIRATION RATE: 18 BRPM | SYSTOLIC BLOOD PRESSURE: 128 MMHG | HEART RATE: 96 BPM | OXYGEN SATURATION: 100 % | TEMPERATURE: 98.3 F

## 2020-01-01 VITALS
DIASTOLIC BLOOD PRESSURE: 72 MMHG | BODY MASS INDEX: 30.89 KG/M2 | TEMPERATURE: 98.8 F | RESPIRATION RATE: 18 BRPM | WEIGHT: 191.4 LBS | SYSTOLIC BLOOD PRESSURE: 105 MMHG | OXYGEN SATURATION: 97 % | HEART RATE: 108 BPM

## 2020-01-01 VITALS
DIASTOLIC BLOOD PRESSURE: 61 MMHG | HEART RATE: 74 BPM | TEMPERATURE: 98.3 F | OXYGEN SATURATION: 95 % | RESPIRATION RATE: 18 BRPM | SYSTOLIC BLOOD PRESSURE: 126 MMHG

## 2020-01-01 VITALS
OXYGEN SATURATION: 97 % | TEMPERATURE: 98.3 F | DIASTOLIC BLOOD PRESSURE: 88 MMHG | HEART RATE: 85 BPM | SYSTOLIC BLOOD PRESSURE: 127 MMHG | RESPIRATION RATE: 18 BRPM

## 2020-01-01 VITALS
DIASTOLIC BLOOD PRESSURE: 66 MMHG | HEART RATE: 82 BPM | RESPIRATION RATE: 16 BRPM | SYSTOLIC BLOOD PRESSURE: 131 MMHG | TEMPERATURE: 98.4 F | OXYGEN SATURATION: 99 %

## 2020-01-01 VITALS
OXYGEN SATURATION: 99 % | DIASTOLIC BLOOD PRESSURE: 68 MMHG | RESPIRATION RATE: 18 BRPM | TEMPERATURE: 97.9 F | HEART RATE: 83 BPM | SYSTOLIC BLOOD PRESSURE: 124 MMHG

## 2020-01-01 DIAGNOSIS — C92.01 AML (ACUTE MYELOID LEUKEMIA) IN REMISSION (HCC): ICD-10-CM

## 2020-01-01 DIAGNOSIS — C92.00 ACUTE MYELOID LEUKEMIA NOT HAVING ACHIEVED REMISSION (HCC): Primary | ICD-10-CM

## 2020-01-01 DIAGNOSIS — Z51.11 ADMISSION FOR ANTINEOPLASTIC CHEMOTHERAPY: ICD-10-CM

## 2020-01-01 DIAGNOSIS — C92.01 ACUTE MYELOID LEUKEMIA IN REMISSION (HCC): ICD-10-CM

## 2020-01-01 DIAGNOSIS — C92.00 ACUTE MYELOID LEUKEMIA NOT HAVING ACHIEVED REMISSION (HCC): ICD-10-CM

## 2020-01-01 DIAGNOSIS — C92.01 AML (ACUTE MYELOID LEUKEMIA) IN REMISSION (HCC): Primary | ICD-10-CM

## 2020-01-01 DIAGNOSIS — C92.01 ACUTE MYELOID LEUKEMIA IN REMISSION (HCC): Primary | ICD-10-CM

## 2020-01-01 DIAGNOSIS — D61.818 PANCYTOPENIA (HCC): ICD-10-CM

## 2020-01-01 DIAGNOSIS — R50.81 FEBRILE NEUTROPENIA (HCC): ICD-10-CM

## 2020-01-01 DIAGNOSIS — C92.00 AML (ACUTE MYELOID LEUKEMIA) WITH FAILED REMISSION (HCC): ICD-10-CM

## 2020-01-01 DIAGNOSIS — B99.9 IMMUNOCOMPROMISED STATUS ASSOCIATED WITH INFECTION (HCC): ICD-10-CM

## 2020-01-01 DIAGNOSIS — D84.9 IMMUNOCOMPROMISED (HCC): ICD-10-CM

## 2020-01-01 DIAGNOSIS — D70.9 FEBRILE NEUTROPENIA (HCC): ICD-10-CM

## 2020-01-01 DIAGNOSIS — D84.81 IMMUNOCOMPROMISED STATUS ASSOCIATED WITH INFECTION (HCC): ICD-10-CM

## 2020-01-01 DIAGNOSIS — C92.00 AML (ACUTE MYELOBLASTIC LEUKEMIA) (HCC): ICD-10-CM

## 2020-01-01 DIAGNOSIS — T45.1X5A PANCYTOPENIA DUE TO ANTINEOPLASTIC CHEMOTHERAPY (HCC): Primary | ICD-10-CM

## 2020-01-01 DIAGNOSIS — D69.6 THROMBOCYTOPENIA (HCC): ICD-10-CM

## 2020-01-01 DIAGNOSIS — K21.9 GASTROESOPHAGEAL REFLUX DISEASE, ESOPHAGITIS PRESENCE NOT SPECIFIED: ICD-10-CM

## 2020-01-01 DIAGNOSIS — R50.9 FEVER, UNSPECIFIED FEVER CAUSE: ICD-10-CM

## 2020-01-01 DIAGNOSIS — D61.810 PANCYTOPENIA DUE TO ANTINEOPLASTIC CHEMOTHERAPY (HCC): Primary | ICD-10-CM

## 2020-01-01 LAB
ABO + RH BLD: NORMAL
ALBUMIN SERPL-MCNC: 2.8 G/DL (ref 3.2–4.6)
ALBUMIN SERPL-MCNC: 2.9 G/DL (ref 3.2–4.6)
ALBUMIN SERPL-MCNC: 3 G/DL (ref 3.2–4.6)
ALBUMIN SERPL-MCNC: 3.1 G/DL (ref 3.2–4.6)
ALBUMIN SERPL-MCNC: 3.2 G/DL (ref 3.2–4.6)
ALBUMIN SERPL-MCNC: 3.3 G/DL (ref 3.2–4.6)
ALBUMIN SERPL-MCNC: 3.4 G/DL (ref 3.2–4.6)
ALBUMIN SERPL-MCNC: 3.5 G/DL (ref 3.2–4.6)
ALBUMIN SERPL-MCNC: 3.6 G/DL (ref 3.2–4.6)
ALBUMIN SERPL-MCNC: 3.7 G/DL (ref 3.2–4.6)
ALBUMIN SERPL-MCNC: 3.8 G/DL (ref 3.2–4.6)
ALBUMIN SERPL-MCNC: 3.9 G/DL (ref 3.2–4.6)
ALBUMIN SERPL-MCNC: 4 G/DL (ref 3.2–4.6)
ALBUMIN SERPL-MCNC: 4.2 G/DL (ref 3.2–4.6)
ALBUMIN/GLOB SERPL: 0.8 {RATIO} (ref 1.2–3.5)
ALBUMIN/GLOB SERPL: 0.9 {RATIO} (ref 1.2–3.5)
ALBUMIN/GLOB SERPL: 0.9 {RATIO} (ref 1.2–3.5)
ALBUMIN/GLOB SERPL: 1 {RATIO} (ref 1.2–3.5)
ALBUMIN/GLOB SERPL: 1.1 {RATIO} (ref 1.2–3.5)
ALBUMIN/GLOB SERPL: 1.2 {RATIO} (ref 1.2–3.5)
ALBUMIN/GLOB SERPL: 1.3 {RATIO} (ref 1.2–3.5)
ALBUMIN/GLOB SERPL: 1.4 {RATIO} (ref 1.2–3.5)
ALBUMIN/GLOB SERPL: 1.5 {RATIO} (ref 1.2–3.5)
ALBUMIN/GLOB SERPL: 1.6 {RATIO} (ref 1.2–3.5)
ALBUMIN/GLOB SERPL: 1.6 {RATIO} (ref 1.2–3.5)
ALBUMIN/GLOB SERPL: 1.7 {RATIO} (ref 1.2–3.5)
ALP SERPL-CCNC: 100 U/L (ref 50–136)
ALP SERPL-CCNC: 102 U/L (ref 50–136)
ALP SERPL-CCNC: 102 U/L (ref 50–136)
ALP SERPL-CCNC: 103 U/L (ref 50–136)
ALP SERPL-CCNC: 103 U/L (ref 50–136)
ALP SERPL-CCNC: 104 U/L (ref 50–136)
ALP SERPL-CCNC: 105 U/L (ref 50–136)
ALP SERPL-CCNC: 105 U/L (ref 50–136)
ALP SERPL-CCNC: 106 U/L (ref 50–136)
ALP SERPL-CCNC: 107 U/L (ref 50–136)
ALP SERPL-CCNC: 108 U/L (ref 50–136)
ALP SERPL-CCNC: 109 U/L (ref 50–136)
ALP SERPL-CCNC: 109 U/L (ref 50–136)
ALP SERPL-CCNC: 110 U/L (ref 50–136)
ALP SERPL-CCNC: 110 U/L (ref 50–136)
ALP SERPL-CCNC: 111 U/L (ref 50–136)
ALP SERPL-CCNC: 111 U/L (ref 50–136)
ALP SERPL-CCNC: 112 U/L (ref 50–136)
ALP SERPL-CCNC: 113 U/L (ref 50–136)
ALP SERPL-CCNC: 113 U/L (ref 50–136)
ALP SERPL-CCNC: 114 U/L (ref 50–136)
ALP SERPL-CCNC: 114 U/L (ref 50–136)
ALP SERPL-CCNC: 115 U/L (ref 50–136)
ALP SERPL-CCNC: 116 U/L (ref 50–130)
ALP SERPL-CCNC: 116 U/L (ref 50–136)
ALP SERPL-CCNC: 116 U/L (ref 50–136)
ALP SERPL-CCNC: 117 U/L (ref 50–136)
ALP SERPL-CCNC: 118 U/L (ref 50–136)
ALP SERPL-CCNC: 118 U/L (ref 50–136)
ALP SERPL-CCNC: 119 U/L (ref 50–136)
ALP SERPL-CCNC: 120 U/L (ref 50–136)
ALP SERPL-CCNC: 120 U/L (ref 50–136)
ALP SERPL-CCNC: 121 U/L (ref 50–136)
ALP SERPL-CCNC: 122 U/L (ref 50–136)
ALP SERPL-CCNC: 122 U/L (ref 50–136)
ALP SERPL-CCNC: 124 U/L (ref 50–136)
ALP SERPL-CCNC: 124 U/L (ref 50–136)
ALP SERPL-CCNC: 125 U/L (ref 50–136)
ALP SERPL-CCNC: 125 U/L (ref 50–136)
ALP SERPL-CCNC: 126 U/L (ref 50–136)
ALP SERPL-CCNC: 127 U/L (ref 50–136)
ALP SERPL-CCNC: 128 U/L (ref 50–136)
ALP SERPL-CCNC: 129 U/L (ref 50–136)
ALP SERPL-CCNC: 129 U/L (ref 50–136)
ALP SERPL-CCNC: 131 U/L (ref 50–136)
ALP SERPL-CCNC: 132 U/L (ref 50–136)
ALP SERPL-CCNC: 133 U/L (ref 50–136)
ALP SERPL-CCNC: 134 U/L (ref 50–136)
ALP SERPL-CCNC: 134 U/L (ref 50–136)
ALP SERPL-CCNC: 135 U/L (ref 50–136)
ALP SERPL-CCNC: 135 U/L (ref 50–136)
ALP SERPL-CCNC: 137 U/L (ref 50–136)
ALP SERPL-CCNC: 138 U/L (ref 50–136)
ALP SERPL-CCNC: 139 U/L (ref 50–136)
ALP SERPL-CCNC: 139 U/L (ref 50–136)
ALP SERPL-CCNC: 140 U/L (ref 50–136)
ALP SERPL-CCNC: 141 U/L (ref 50–136)
ALP SERPL-CCNC: 141 U/L (ref 50–136)
ALP SERPL-CCNC: 142 U/L (ref 50–136)
ALP SERPL-CCNC: 142 U/L (ref 50–136)
ALP SERPL-CCNC: 144 U/L (ref 50–136)
ALP SERPL-CCNC: 145 U/L (ref 50–136)
ALP SERPL-CCNC: 148 U/L (ref 50–136)
ALP SERPL-CCNC: 148 U/L (ref 50–136)
ALP SERPL-CCNC: 150 U/L (ref 50–136)
ALP SERPL-CCNC: 151 U/L (ref 50–136)
ALP SERPL-CCNC: 153 U/L (ref 50–136)
ALP SERPL-CCNC: 77 U/L (ref 50–136)
ALP SERPL-CCNC: 85 U/L (ref 50–136)
ALP SERPL-CCNC: 86 U/L (ref 50–136)
ALP SERPL-CCNC: 86 U/L (ref 50–136)
ALP SERPL-CCNC: 89 U/L (ref 50–136)
ALP SERPL-CCNC: 90 U/L (ref 50–136)
ALP SERPL-CCNC: 91 U/L (ref 50–136)
ALP SERPL-CCNC: 92 U/L (ref 50–136)
ALP SERPL-CCNC: 93 U/L (ref 50–136)
ALP SERPL-CCNC: 95 U/L (ref 50–136)
ALP SERPL-CCNC: 97 U/L (ref 50–136)
ALP SERPL-CCNC: 97 U/L (ref 50–136)
ALP SERPL-CCNC: 98 U/L (ref 50–136)
ALP SERPL-CCNC: 98 U/L (ref 50–136)
ALP SERPL-CCNC: 99 U/L (ref 50–136)
ALT SERPL-CCNC: 13 U/L (ref 12–65)
ALT SERPL-CCNC: 14 U/L (ref 12–65)
ALT SERPL-CCNC: 15 U/L (ref 12–65)
ALT SERPL-CCNC: 16 U/L (ref 12–65)
ALT SERPL-CCNC: 17 U/L (ref 12–65)
ALT SERPL-CCNC: 18 U/L (ref 12–65)
ALT SERPL-CCNC: 19 U/L (ref 12–65)
ALT SERPL-CCNC: 20 U/L (ref 12–65)
ALT SERPL-CCNC: 21 U/L (ref 12–65)
ALT SERPL-CCNC: 22 U/L (ref 12–65)
ALT SERPL-CCNC: 23 U/L (ref 12–65)
ALT SERPL-CCNC: 24 U/L (ref 12–65)
ALT SERPL-CCNC: 25 U/L (ref 12–65)
ALT SERPL-CCNC: 26 U/L (ref 12–65)
ALT SERPL-CCNC: 27 U/L (ref 12–65)
ALT SERPL-CCNC: 27 U/L (ref 12–65)
ALT SERPL-CCNC: 28 U/L (ref 12–65)
ALT SERPL-CCNC: 28 U/L (ref 12–65)
ALT SERPL-CCNC: 29 U/L (ref 12–65)
ANION GAP SERPL CALC-SCNC: 1 MMOL/L (ref 7–16)
ANION GAP SERPL CALC-SCNC: 10 MMOL/L (ref 7–16)
ANION GAP SERPL CALC-SCNC: 2 MMOL/L (ref 7–16)
ANION GAP SERPL CALC-SCNC: 2 MMOL/L (ref 7–16)
ANION GAP SERPL CALC-SCNC: 3 MMOL/L (ref 7–16)
ANION GAP SERPL CALC-SCNC: 4 MMOL/L (ref 7–16)
ANION GAP SERPL CALC-SCNC: 5 MMOL/L (ref 7–16)
ANION GAP SERPL CALC-SCNC: 6 MMOL/L (ref 7–16)
ANION GAP SERPL CALC-SCNC: 7 MMOL/L (ref 7–16)
ANION GAP SERPL CALC-SCNC: 8 MMOL/L (ref 7–16)
ANION GAP SERPL CALC-SCNC: 9 MMOL/L (ref 7–16)
APPEARANCE UR: ABNORMAL
APPEARANCE UR: CLEAR
AST SERPL-CCNC: 10 U/L (ref 15–37)
AST SERPL-CCNC: 11 U/L (ref 15–37)
AST SERPL-CCNC: 12 U/L (ref 15–37)
AST SERPL-CCNC: 13 U/L (ref 15–37)
AST SERPL-CCNC: 14 U/L (ref 15–37)
AST SERPL-CCNC: 15 U/L (ref 15–37)
AST SERPL-CCNC: 16 U/L (ref 15–37)
AST SERPL-CCNC: 17 U/L (ref 15–37)
AST SERPL-CCNC: 18 U/L (ref 15–37)
AST SERPL-CCNC: 19 U/L (ref 15–37)
AST SERPL-CCNC: 20 U/L (ref 15–37)
AST SERPL-CCNC: 20 U/L (ref 15–37)
AST SERPL-CCNC: 21 U/L (ref 15–37)
AST SERPL-CCNC: 23 U/L (ref 15–37)
AST SERPL-CCNC: 6 U/L (ref 15–37)
AST SERPL-CCNC: 7 U/L (ref 15–37)
AST SERPL-CCNC: 8 U/L (ref 15–37)
AST SERPL-CCNC: 9 U/L (ref 15–37)
BACTERIA SPEC CULT: ABNORMAL
BACTERIA SPEC CULT: NORMAL
BACTERIA URNS QL MICRO: ABNORMAL /HPF
BACTERIA URNS QL MICRO: ABNORMAL /HPF
BASOPHILS # BLD: 0 K/UL (ref 0–0.2)
BASOPHILS NFR BLD MANUAL: 1 % (ref 0–2)
BASOPHILS NFR BLD MANUAL: 1 % (ref 0–2)
BASOPHILS NFR BLD: 0 % (ref 0–2)
BASOPHILS NFR BLD: 1 % (ref 0–2)
BILIRUB SERPL-MCNC: 0.1 MG/DL (ref 0.2–1.1)
BILIRUB SERPL-MCNC: 0.1 MG/DL (ref 0.2–1.1)
BILIRUB SERPL-MCNC: 0.2 MG/DL (ref 0.2–1.1)
BILIRUB SERPL-MCNC: 0.3 MG/DL (ref 0.2–1.1)
BILIRUB SERPL-MCNC: 0.4 MG/DL (ref 0.2–1.1)
BILIRUB SERPL-MCNC: 0.5 MG/DL (ref 0.2–1.1)
BILIRUB SERPL-MCNC: 0.6 MG/DL (ref 0.2–1.1)
BILIRUB SERPL-MCNC: 0.7 MG/DL (ref 0.2–1.1)
BILIRUB UR QL: NEGATIVE
BILIRUB UR QL: NEGATIVE
BLASTS NFR BLD MANUAL: 18 %
BLASTS NFR BLD MANUAL: 36 %
BLASTS NFR BLD MANUAL: 39 %
BLASTS NFR BLD MANUAL: 68 %
BLASTS NFR BLD MANUAL: 70 %
BLASTS NFR BLD MANUAL: 74 %
BLASTS NFR BLD MANUAL: 87 %
BLASTS NFR BLD MANUAL: 89 %
BLD PROD TYP BPU: NORMAL
BLOOD BANK CMNT PATIENT-IMP: NORMAL
BLOOD GROUP ANTIBODIES SERPL: NORMAL
BONE MARROW PREP & W,BMA: NORMAL
BPU ID: NORMAL
BUN SERPL-MCNC: 11 MG/DL (ref 8–23)
BUN SERPL-MCNC: 12 MG/DL (ref 8–23)
BUN SERPL-MCNC: 13 MG/DL (ref 8–23)
BUN SERPL-MCNC: 14 MG/DL (ref 8–23)
BUN SERPL-MCNC: 15 MG/DL (ref 8–23)
BUN SERPL-MCNC: 16 MG/DL (ref 8–23)
BUN SERPL-MCNC: 17 MG/DL (ref 8–23)
BUN SERPL-MCNC: 18 MG/DL (ref 8–23)
BUN SERPL-MCNC: 19 MG/DL (ref 8–23)
BUN SERPL-MCNC: 20 MG/DL (ref 8–23)
BUN SERPL-MCNC: 21 MG/DL (ref 8–23)
BUN SERPL-MCNC: 22 MG/DL (ref 8–23)
BUN SERPL-MCNC: 23 MG/DL (ref 8–23)
BUN SERPL-MCNC: 24 MG/DL (ref 8–23)
BUN SERPL-MCNC: 25 MG/DL (ref 8–23)
BUN SERPL-MCNC: 27 MG/DL (ref 8–23)
CALCIUM SERPL-MCNC: 8.4 MG/DL (ref 8.3–10.4)
CALCIUM SERPL-MCNC: 8.5 MG/DL (ref 8.3–10.4)
CALCIUM SERPL-MCNC: 8.6 MG/DL (ref 8.3–10.4)
CALCIUM SERPL-MCNC: 8.8 MG/DL (ref 8.3–10.4)
CALCIUM SERPL-MCNC: 8.9 MG/DL (ref 8.3–10.4)
CALCIUM SERPL-MCNC: 9 MG/DL (ref 8.3–10.4)
CALCIUM SERPL-MCNC: 9.1 MG/DL (ref 8.3–10.4)
CALCIUM SERPL-MCNC: 9.2 MG/DL (ref 8.3–10.4)
CALCIUM SERPL-MCNC: 9.3 MG/DL (ref 8.3–10.4)
CALCIUM SERPL-MCNC: 9.4 MG/DL (ref 8.3–10.4)
CALCIUM SERPL-MCNC: 9.5 MG/DL (ref 8.3–10.4)
CALCIUM SERPL-MCNC: 9.6 MG/DL (ref 8.3–10.4)
CALCIUM SERPL-MCNC: 9.7 MG/DL (ref 8.3–10.4)
CASTS URNS QL MICRO: 0 /LPF
CASTS URNS QL MICRO: ABNORMAL /LPF
CHLORIDE SERPL-SCNC: 104 MMOL/L (ref 98–107)
CHLORIDE SERPL-SCNC: 105 MMOL/L (ref 98–107)
CHLORIDE SERPL-SCNC: 106 MMOL/L (ref 98–107)
CHLORIDE SERPL-SCNC: 107 MMOL/L (ref 98–107)
CHLORIDE SERPL-SCNC: 108 MMOL/L (ref 98–107)
CHLORIDE SERPL-SCNC: 109 MMOL/L (ref 98–107)
CHLORIDE SERPL-SCNC: 110 MMOL/L (ref 98–107)
CHLORIDE SERPL-SCNC: 111 MMOL/L (ref 98–107)
CHLORIDE SERPL-SCNC: 112 MMOL/L (ref 98–107)
CHLORIDE SERPL-SCNC: 112 MMOL/L (ref 98–107)
CO2 SERPL-SCNC: 24 MMOL/L (ref 21–32)
CO2 SERPL-SCNC: 25 MMOL/L (ref 21–32)
CO2 SERPL-SCNC: 26 MMOL/L (ref 21–32)
CO2 SERPL-SCNC: 27 MMOL/L (ref 21–32)
CO2 SERPL-SCNC: 28 MMOL/L (ref 21–32)
CO2 SERPL-SCNC: 29 MMOL/L (ref 21–32)
CO2 SERPL-SCNC: 30 MMOL/L (ref 21–32)
CO2 SERPL-SCNC: 31 MMOL/L (ref 21–32)
COLOR UR: YELLOW
COLOR UR: YELLOW
COVID-19 RAPID TEST, COVR: NOT DETECTED
CREAT SERPL-MCNC: 0.56 MG/DL (ref 0.6–1)
CREAT SERPL-MCNC: 0.57 MG/DL (ref 0.6–1)
CREAT SERPL-MCNC: 0.59 MG/DL (ref 0.6–1)
CREAT SERPL-MCNC: 0.6 MG/DL (ref 0.6–1)
CREAT SERPL-MCNC: 0.61 MG/DL (ref 0.6–1)
CREAT SERPL-MCNC: 0.61 MG/DL (ref 0.6–1)
CREAT SERPL-MCNC: 0.62 MG/DL (ref 0.6–1)
CREAT SERPL-MCNC: 0.63 MG/DL (ref 0.6–1)
CREAT SERPL-MCNC: 0.65 MG/DL (ref 0.6–1)
CREAT SERPL-MCNC: 0.65 MG/DL (ref 0.6–1)
CREAT SERPL-MCNC: 0.66 MG/DL (ref 0.6–1)
CREAT SERPL-MCNC: 0.67 MG/DL (ref 0.6–1)
CREAT SERPL-MCNC: 0.7 MG/DL (ref 0.6–1)
CREAT SERPL-MCNC: 0.71 MG/DL (ref 0.6–1)
CREAT SERPL-MCNC: 0.72 MG/DL (ref 0.6–1)
CREAT SERPL-MCNC: 0.72 MG/DL (ref 0.6–1)
CREAT SERPL-MCNC: 0.73 MG/DL (ref 0.6–1)
CREAT SERPL-MCNC: 0.74 MG/DL (ref 0.6–1)
CREAT SERPL-MCNC: 0.76 MG/DL (ref 0.6–1)
CREAT SERPL-MCNC: 0.78 MG/DL (ref 0.6–1)
CREAT SERPL-MCNC: 0.78 MG/DL (ref 0.6–1)
CREAT SERPL-MCNC: 0.79 MG/DL (ref 0.6–1)
CREAT SERPL-MCNC: 0.79 MG/DL (ref 0.6–1)
CREAT SERPL-MCNC: 0.8 MG/DL (ref 0.6–1)
CREAT SERPL-MCNC: 0.82 MG/DL (ref 0.6–1)
CREAT SERPL-MCNC: 0.85 MG/DL (ref 0.6–1)
CREAT SERPL-MCNC: 0.85 MG/DL (ref 0.6–1)
CREAT SERPL-MCNC: 0.86 MG/DL (ref 0.6–1)
CREAT SERPL-MCNC: 0.89 MG/DL (ref 0.6–1)
CREAT SERPL-MCNC: 0.9 MG/DL (ref 0.6–1)
CREAT SERPL-MCNC: 0.91 MG/DL (ref 0.6–1)
CREAT SERPL-MCNC: 0.91 MG/DL (ref 0.6–1)
CREAT SERPL-MCNC: 0.92 MG/DL (ref 0.6–1)
CREAT SERPL-MCNC: 0.92 MG/DL (ref 0.6–1)
CREAT SERPL-MCNC: 0.93 MG/DL (ref 0.6–1)
CREAT SERPL-MCNC: 0.94 MG/DL (ref 0.6–1)
CREAT SERPL-MCNC: 0.96 MG/DL (ref 0.6–1)
CREAT SERPL-MCNC: 1 MG/DL (ref 0.6–1)
CREAT SERPL-MCNC: 1.04 MG/DL (ref 0.6–1)
CREAT SERPL-MCNC: 1.1 MG/DL (ref 0.6–1)
CREAT SERPL-MCNC: 1.1 MG/DL (ref 0.6–1)
CROSSMATCH RESULT,%XM: NORMAL
CRYSTALS URNS QL MICRO: 0 /LPF
CRYSTALS URNS QL MICRO: 0 /LPF
DIFFERENTIAL METHOD BLD: ABNORMAL
EOSINOPHIL # BLD: 0 K/UL (ref 0–0.8)
EOSINOPHIL # BLD: 0.1 K/UL (ref 0–0.8)
EOSINOPHIL # BLD: 0.2 K/UL (ref 0–0.8)
EOSINOPHIL NFR BLD MANUAL: 1 % (ref 1–8)
EOSINOPHIL NFR BLD MANUAL: 1 % (ref 1–8)
EOSINOPHIL NFR BLD MANUAL: 2 % (ref 1–8)
EOSINOPHIL NFR BLD: 0 % (ref 0.5–7.8)
EOSINOPHIL NFR BLD: 1 % (ref 0.5–7.8)
EOSINOPHIL NFR BLD: 2 % (ref 0.5–7.8)
EOSINOPHIL NFR BLD: 3 % (ref 0.5–7.8)
EOSINOPHIL NFR BLD: 4 % (ref 0.5–7.8)
EOSINOPHIL NFR BLD: 4 % (ref 0.5–7.8)
EOSINOPHIL NFR BLD: 5 % (ref 0.5–7.8)
EPI CELLS #/AREA URNS HPF: ABNORMAL /HPF
EPI CELLS #/AREA URNS HPF: ABNORMAL /HPF
ERYTHROCYTE [DISTWIDTH] IN BLOOD BY AUTOMATED COUNT: 14.7 % (ref 11.9–14.6)
ERYTHROCYTE [DISTWIDTH] IN BLOOD BY AUTOMATED COUNT: 14.8 % (ref 11.9–14.6)
ERYTHROCYTE [DISTWIDTH] IN BLOOD BY AUTOMATED COUNT: 14.9 % (ref 11.9–14.6)
ERYTHROCYTE [DISTWIDTH] IN BLOOD BY AUTOMATED COUNT: 15 % (ref 11.9–14.6)
ERYTHROCYTE [DISTWIDTH] IN BLOOD BY AUTOMATED COUNT: 15.1 % (ref 11.9–14.6)
ERYTHROCYTE [DISTWIDTH] IN BLOOD BY AUTOMATED COUNT: 15.2 % (ref 11.9–14.6)
ERYTHROCYTE [DISTWIDTH] IN BLOOD BY AUTOMATED COUNT: 15.3 % (ref 11.9–14.6)
ERYTHROCYTE [DISTWIDTH] IN BLOOD BY AUTOMATED COUNT: 15.4 % (ref 11.9–14.6)
ERYTHROCYTE [DISTWIDTH] IN BLOOD BY AUTOMATED COUNT: 15.6 % (ref 11.9–14.6)
ERYTHROCYTE [DISTWIDTH] IN BLOOD BY AUTOMATED COUNT: 15.7 % (ref 11.9–14.6)
ERYTHROCYTE [DISTWIDTH] IN BLOOD BY AUTOMATED COUNT: 15.8 % (ref 11.9–14.6)
ERYTHROCYTE [DISTWIDTH] IN BLOOD BY AUTOMATED COUNT: 15.9 % (ref 11.9–14.6)
ERYTHROCYTE [DISTWIDTH] IN BLOOD BY AUTOMATED COUNT: 16 % (ref 11.9–14.6)
ERYTHROCYTE [DISTWIDTH] IN BLOOD BY AUTOMATED COUNT: 16.2 % (ref 11.9–14.6)
ERYTHROCYTE [DISTWIDTH] IN BLOOD BY AUTOMATED COUNT: 16.3 % (ref 11.9–14.6)
ERYTHROCYTE [DISTWIDTH] IN BLOOD BY AUTOMATED COUNT: 16.3 % (ref 11.9–14.6)
ERYTHROCYTE [DISTWIDTH] IN BLOOD BY AUTOMATED COUNT: 16.4 % (ref 11.9–14.6)
ERYTHROCYTE [DISTWIDTH] IN BLOOD BY AUTOMATED COUNT: 16.5 % (ref 11.9–14.6)
ERYTHROCYTE [DISTWIDTH] IN BLOOD BY AUTOMATED COUNT: 16.6 % (ref 11.9–14.6)
ERYTHROCYTE [DISTWIDTH] IN BLOOD BY AUTOMATED COUNT: 16.6 % (ref 11.9–14.6)
ERYTHROCYTE [DISTWIDTH] IN BLOOD BY AUTOMATED COUNT: 16.8 % (ref 11.9–14.6)
ERYTHROCYTE [DISTWIDTH] IN BLOOD BY AUTOMATED COUNT: 16.9 % (ref 11.9–14.6)
ERYTHROCYTE [DISTWIDTH] IN BLOOD BY AUTOMATED COUNT: 17 % (ref 11.9–14.6)
ERYTHROCYTE [DISTWIDTH] IN BLOOD BY AUTOMATED COUNT: 17.1 % (ref 11.9–14.6)
ERYTHROCYTE [DISTWIDTH] IN BLOOD BY AUTOMATED COUNT: 17.1 % (ref 11.9–14.6)
ERYTHROCYTE [DISTWIDTH] IN BLOOD BY AUTOMATED COUNT: 17.2 % (ref 11.9–14.6)
ERYTHROCYTE [DISTWIDTH] IN BLOOD BY AUTOMATED COUNT: 17.3 % (ref 11.9–14.6)
ERYTHROCYTE [DISTWIDTH] IN BLOOD BY AUTOMATED COUNT: 17.4 % (ref 11.9–14.6)
ERYTHROCYTE [DISTWIDTH] IN BLOOD BY AUTOMATED COUNT: 17.5 % (ref 11.9–14.6)
ERYTHROCYTE [DISTWIDTH] IN BLOOD BY AUTOMATED COUNT: 17.6 % (ref 11.9–14.6)
ERYTHROCYTE [DISTWIDTH] IN BLOOD BY AUTOMATED COUNT: 17.7 % (ref 11.9–14.6)
ERYTHROCYTE [DISTWIDTH] IN BLOOD BY AUTOMATED COUNT: 17.8 % (ref 11.9–14.6)
ERYTHROCYTE [DISTWIDTH] IN BLOOD BY AUTOMATED COUNT: 17.8 % (ref 11.9–14.6)
ERYTHROCYTE [DISTWIDTH] IN BLOOD BY AUTOMATED COUNT: 18.1 % (ref 11.9–14.6)
ERYTHROCYTE [DISTWIDTH] IN BLOOD BY AUTOMATED COUNT: 18.2 % (ref 11.9–14.6)
ERYTHROCYTE [DISTWIDTH] IN BLOOD BY AUTOMATED COUNT: 18.3 % (ref 11.9–14.6)
ERYTHROCYTE [DISTWIDTH] IN BLOOD BY AUTOMATED COUNT: 18.4 % (ref 11.9–14.6)
ERYTHROCYTE [DISTWIDTH] IN BLOOD BY AUTOMATED COUNT: 18.6 % (ref 11.9–14.6)
ERYTHROCYTE [DISTWIDTH] IN BLOOD BY AUTOMATED COUNT: 18.7 % (ref 11.9–14.6)
ERYTHROCYTE [DISTWIDTH] IN BLOOD BY AUTOMATED COUNT: 18.8 % (ref 11.9–14.6)
ERYTHROCYTE [DISTWIDTH] IN BLOOD BY AUTOMATED COUNT: 18.8 % (ref 11.9–14.6)
ERYTHROCYTE [DISTWIDTH] IN BLOOD BY AUTOMATED COUNT: 19 % (ref 11.9–14.6)
ERYTHROCYTE [DISTWIDTH] IN BLOOD BY AUTOMATED COUNT: 19.1 % (ref 11.9–14.6)
ERYTHROCYTE [DISTWIDTH] IN BLOOD BY AUTOMATED COUNT: 19.2 % (ref 11.9–14.6)
ERYTHROCYTE [DISTWIDTH] IN BLOOD BY AUTOMATED COUNT: 19.5 % (ref 11.9–14.6)
ERYTHROCYTE [DISTWIDTH] IN BLOOD BY AUTOMATED COUNT: 19.5 % (ref 11.9–14.6)
ERYTHROCYTE [DISTWIDTH] IN BLOOD BY AUTOMATED COUNT: 19.8 % (ref 11.9–14.6)
ERYTHROCYTE [DISTWIDTH] IN BLOOD BY AUTOMATED COUNT: 19.9 % (ref 11.9–14.6)
ERYTHROCYTE [DISTWIDTH] IN BLOOD BY AUTOMATED COUNT: 20.1 % (ref 11.9–14.6)
ERYTHROCYTE [DISTWIDTH] IN BLOOD BY AUTOMATED COUNT: 20.4 % (ref 11.9–14.6)
ERYTHROCYTE [DISTWIDTH] IN BLOOD BY AUTOMATED COUNT: 20.4 % (ref 11.9–14.6)
ERYTHROCYTE [DISTWIDTH] IN BLOOD BY AUTOMATED COUNT: 20.5 % (ref 11.9–14.6)
ERYTHROCYTE [DISTWIDTH] IN BLOOD BY AUTOMATED COUNT: 20.5 % (ref 11.9–14.6)
ERYTHROCYTE [DISTWIDTH] IN BLOOD BY AUTOMATED COUNT: 20.7 % (ref 11.9–14.6)
ERYTHROCYTE [DISTWIDTH] IN BLOOD BY AUTOMATED COUNT: 20.7 % (ref 11.9–14.6)
ERYTHROCYTE [DISTWIDTH] IN BLOOD BY AUTOMATED COUNT: 20.9 % (ref 11.9–14.6)
ERYTHROCYTE [DISTWIDTH] IN BLOOD BY AUTOMATED COUNT: 21.2 % (ref 11.9–14.6)
ERYTHROCYTE [DISTWIDTH] IN BLOOD BY AUTOMATED COUNT: 21.5 % (ref 11.9–14.6)
ERYTHROCYTE [DISTWIDTH] IN BLOOD BY AUTOMATED COUNT: 21.6 % (ref 11.9–14.6)
ERYTHROCYTE [DISTWIDTH] IN BLOOD BY AUTOMATED COUNT: 21.8 % (ref 11.9–14.6)
ERYTHROCYTE [DISTWIDTH] IN BLOOD BY AUTOMATED COUNT: 21.8 % (ref 11.9–14.6)
ERYTHROCYTE [DISTWIDTH] IN BLOOD BY AUTOMATED COUNT: 21.9 % (ref 11.9–14.6)
ERYTHROCYTE [DISTWIDTH] IN BLOOD BY AUTOMATED COUNT: 22.1 % (ref 11.9–14.6)
ERYTHROCYTE [DISTWIDTH] IN BLOOD BY AUTOMATED COUNT: 22.2 % (ref 11.9–14.6)
ERYTHROCYTE [DISTWIDTH] IN BLOOD BY AUTOMATED COUNT: 22.4 % (ref 11.9–14.6)
ERYTHROCYTE [DISTWIDTH] IN BLOOD BY AUTOMATED COUNT: 22.5 % (ref 11.9–14.6)
ERYTHROCYTE [DISTWIDTH] IN BLOOD BY AUTOMATED COUNT: 22.5 % (ref 11.9–14.6)
ERYTHROCYTE [DISTWIDTH] IN BLOOD BY AUTOMATED COUNT: 22.6 % (ref 11.9–14.6)
ERYTHROCYTE [DISTWIDTH] IN BLOOD BY AUTOMATED COUNT: 22.7 % (ref 11.9–14.6)
ERYTHROCYTE [DISTWIDTH] IN BLOOD BY AUTOMATED COUNT: 22.8 % (ref 11.9–14.6)
ERYTHROCYTE [DISTWIDTH] IN BLOOD BY AUTOMATED COUNT: 22.8 % (ref 11.9–14.6)
ERYTHROCYTE [DISTWIDTH] IN BLOOD BY AUTOMATED COUNT: 23.1 % (ref 11.9–14.6)
ERYTHROCYTE [DISTWIDTH] IN BLOOD BY AUTOMATED COUNT: 23.2 % (ref 11.9–14.6)
ERYTHROCYTE [DISTWIDTH] IN BLOOD BY AUTOMATED COUNT: 23.4 % (ref 11.9–14.6)
ERYTHROCYTE [DISTWIDTH] IN BLOOD BY AUTOMATED COUNT: 23.5 % (ref 11.9–14.6)
ERYTHROCYTE [DISTWIDTH] IN BLOOD BY AUTOMATED COUNT: 23.5 % (ref 11.9–14.6)
ERYTHROCYTE [DISTWIDTH] IN BLOOD BY AUTOMATED COUNT: 23.9 % (ref 11.9–14.6)
ERYTHROCYTE [DISTWIDTH] IN BLOOD BY AUTOMATED COUNT: 23.9 % (ref 11.9–14.6)
ERYTHROCYTE [DISTWIDTH] IN BLOOD BY AUTOMATED COUNT: 24 % (ref 11.9–14.6)
ERYTHROCYTE [DISTWIDTH] IN BLOOD BY AUTOMATED COUNT: 24.1 % (ref 11.9–14.6)
ERYTHROCYTE [DISTWIDTH] IN BLOOD BY AUTOMATED COUNT: 24.3 % (ref 11.9–14.6)
ERYTHROCYTE [DISTWIDTH] IN BLOOD BY AUTOMATED COUNT: 25 % (ref 11.9–14.6)
FLOW CYTOMETRY, FBTC1: NORMAL
GLOBULIN SER CALC-MCNC: 2.3 G/DL (ref 2.3–3.5)
GLOBULIN SER CALC-MCNC: 2.4 G/DL (ref 2.3–3.5)
GLOBULIN SER CALC-MCNC: 2.4 G/DL (ref 2.3–3.5)
GLOBULIN SER CALC-MCNC: 2.5 G/DL (ref 2.3–3.5)
GLOBULIN SER CALC-MCNC: 2.6 G/DL (ref 2.3–3.5)
GLOBULIN SER CALC-MCNC: 2.7 G/DL (ref 2.3–3.5)
GLOBULIN SER CALC-MCNC: 2.8 G/DL (ref 2.3–3.5)
GLOBULIN SER CALC-MCNC: 2.9 G/DL (ref 2.3–3.5)
GLOBULIN SER CALC-MCNC: 3 G/DL (ref 2.3–3.5)
GLOBULIN SER CALC-MCNC: 3.1 G/DL (ref 2.3–3.5)
GLOBULIN SER CALC-MCNC: 3.2 G/DL (ref 2.3–3.5)
GLOBULIN SER CALC-MCNC: 3.3 G/DL (ref 2.3–3.5)
GLOBULIN SER CALC-MCNC: 3.4 G/DL (ref 2.3–3.5)
GLOBULIN SER CALC-MCNC: 3.5 G/DL (ref 2.3–3.5)
GLOBULIN SER CALC-MCNC: 3.5 G/DL (ref 2.3–3.5)
GLOBULIN SER CALC-MCNC: 3.6 G/DL (ref 2.3–3.5)
GLUCOSE BLD STRIP.AUTO-MCNC: 111 MG/DL (ref 65–100)
GLUCOSE SERPL-MCNC: 100 MG/DL (ref 65–100)
GLUCOSE SERPL-MCNC: 101 MG/DL (ref 65–100)
GLUCOSE SERPL-MCNC: 102 MG/DL (ref 65–100)
GLUCOSE SERPL-MCNC: 103 MG/DL (ref 65–100)
GLUCOSE SERPL-MCNC: 104 MG/DL (ref 65–100)
GLUCOSE SERPL-MCNC: 105 MG/DL (ref 65–100)
GLUCOSE SERPL-MCNC: 107 MG/DL (ref 65–100)
GLUCOSE SERPL-MCNC: 108 MG/DL (ref 65–100)
GLUCOSE SERPL-MCNC: 108 MG/DL (ref 65–100)
GLUCOSE SERPL-MCNC: 109 MG/DL (ref 65–100)
GLUCOSE SERPL-MCNC: 110 MG/DL (ref 65–100)
GLUCOSE SERPL-MCNC: 112 MG/DL (ref 65–100)
GLUCOSE SERPL-MCNC: 114 MG/DL (ref 65–100)
GLUCOSE SERPL-MCNC: 114 MG/DL (ref 65–100)
GLUCOSE SERPL-MCNC: 115 MG/DL (ref 65–100)
GLUCOSE SERPL-MCNC: 115 MG/DL (ref 65–100)
GLUCOSE SERPL-MCNC: 117 MG/DL (ref 65–100)
GLUCOSE SERPL-MCNC: 118 MG/DL (ref 65–100)
GLUCOSE SERPL-MCNC: 119 MG/DL (ref 65–100)
GLUCOSE SERPL-MCNC: 120 MG/DL (ref 65–100)
GLUCOSE SERPL-MCNC: 121 MG/DL (ref 65–100)
GLUCOSE SERPL-MCNC: 121 MG/DL (ref 65–100)
GLUCOSE SERPL-MCNC: 122 MG/DL (ref 65–100)
GLUCOSE SERPL-MCNC: 123 MG/DL (ref 65–100)
GLUCOSE SERPL-MCNC: 126 MG/DL (ref 65–100)
GLUCOSE SERPL-MCNC: 127 MG/DL (ref 65–100)
GLUCOSE SERPL-MCNC: 128 MG/DL (ref 65–100)
GLUCOSE SERPL-MCNC: 128 MG/DL (ref 65–100)
GLUCOSE SERPL-MCNC: 129 MG/DL (ref 65–100)
GLUCOSE SERPL-MCNC: 130 MG/DL (ref 65–100)
GLUCOSE SERPL-MCNC: 133 MG/DL (ref 65–100)
GLUCOSE SERPL-MCNC: 134 MG/DL (ref 65–100)
GLUCOSE SERPL-MCNC: 135 MG/DL (ref 65–100)
GLUCOSE SERPL-MCNC: 136 MG/DL (ref 65–100)
GLUCOSE SERPL-MCNC: 136 MG/DL (ref 65–100)
GLUCOSE SERPL-MCNC: 137 MG/DL (ref 65–100)
GLUCOSE SERPL-MCNC: 137 MG/DL (ref 65–100)
GLUCOSE SERPL-MCNC: 138 MG/DL (ref 65–100)
GLUCOSE SERPL-MCNC: 139 MG/DL (ref 65–100)
GLUCOSE SERPL-MCNC: 140 MG/DL (ref 65–100)
GLUCOSE SERPL-MCNC: 142 MG/DL (ref 65–100)
GLUCOSE SERPL-MCNC: 142 MG/DL (ref 65–100)
GLUCOSE SERPL-MCNC: 144 MG/DL (ref 65–100)
GLUCOSE SERPL-MCNC: 145 MG/DL (ref 65–100)
GLUCOSE SERPL-MCNC: 146 MG/DL (ref 65–100)
GLUCOSE SERPL-MCNC: 75 MG/DL (ref 65–100)
GLUCOSE SERPL-MCNC: 80 MG/DL (ref 65–100)
GLUCOSE SERPL-MCNC: 81 MG/DL (ref 65–100)
GLUCOSE SERPL-MCNC: 82 MG/DL (ref 65–100)
GLUCOSE SERPL-MCNC: 84 MG/DL (ref 65–100)
GLUCOSE SERPL-MCNC: 85 MG/DL (ref 65–100)
GLUCOSE SERPL-MCNC: 86 MG/DL (ref 65–100)
GLUCOSE SERPL-MCNC: 88 MG/DL (ref 65–100)
GLUCOSE SERPL-MCNC: 89 MG/DL (ref 65–100)
GLUCOSE SERPL-MCNC: 91 MG/DL (ref 65–100)
GLUCOSE SERPL-MCNC: 91 MG/DL (ref 65–100)
GLUCOSE SERPL-MCNC: 92 MG/DL (ref 65–100)
GLUCOSE SERPL-MCNC: 93 MG/DL (ref 65–100)
GLUCOSE SERPL-MCNC: 94 MG/DL (ref 65–100)
GLUCOSE SERPL-MCNC: 94 MG/DL (ref 65–100)
GLUCOSE SERPL-MCNC: 95 MG/DL (ref 65–100)
GLUCOSE SERPL-MCNC: 96 MG/DL (ref 65–100)
GLUCOSE SERPL-MCNC: 97 MG/DL (ref 65–100)
GLUCOSE SERPL-MCNC: 98 MG/DL (ref 65–100)
GLUCOSE SERPL-MCNC: 99 MG/DL (ref 65–100)
GLUCOSE SERPL-MCNC: 99 MG/DL (ref 65–100)
GLUCOSE UR STRIP.AUTO-MCNC: NEGATIVE MG/DL
GLUCOSE UR STRIP.AUTO-MCNC: NEGATIVE MG/DL
HCT VFR BLD AUTO: 18.1 % (ref 35.8–46.3)
HCT VFR BLD AUTO: 19 % (ref 35.8–46.3)
HCT VFR BLD AUTO: 19.3 % (ref 35.8–46.3)
HCT VFR BLD AUTO: 19.3 % (ref 35.8–46.3)
HCT VFR BLD AUTO: 20 % (ref 35.8–46.3)
HCT VFR BLD AUTO: 20.1 % (ref 35.8–46.3)
HCT VFR BLD AUTO: 20.2 % (ref 35.8–46.3)
HCT VFR BLD AUTO: 20.3 % (ref 35.8–46.3)
HCT VFR BLD AUTO: 20.5 % (ref 35.8–46.3)
HCT VFR BLD AUTO: 20.8 % (ref 35.8–46.3)
HCT VFR BLD AUTO: 21.1 % (ref 35.8–46.3)
HCT VFR BLD AUTO: 21.1 % (ref 35.8–46.3)
HCT VFR BLD AUTO: 21.3 % (ref 35.8–46.3)
HCT VFR BLD AUTO: 21.3 % (ref 35.8–46.3)
HCT VFR BLD AUTO: 21.4 % (ref 35.8–46.3)
HCT VFR BLD AUTO: 21.4 % (ref 35.8–46.3)
HCT VFR BLD AUTO: 21.5 % (ref 35.8–46.3)
HCT VFR BLD AUTO: 21.5 % (ref 35.8–46.3)
HCT VFR BLD AUTO: 21.7 % (ref 35.8–46.3)
HCT VFR BLD AUTO: 21.7 % (ref 35.8–46.3)
HCT VFR BLD AUTO: 22 % (ref 35.8–46.3)
HCT VFR BLD AUTO: 22.1 % (ref 35.8–46.3)
HCT VFR BLD AUTO: 22.3 % (ref 35.8–46.3)
HCT VFR BLD AUTO: 22.4 % (ref 35.8–46.3)
HCT VFR BLD AUTO: 22.5 % (ref 35.8–46.3)
HCT VFR BLD AUTO: 22.6 % (ref 35.8–46.3)
HCT VFR BLD AUTO: 22.7 % (ref 35.8–46.3)
HCT VFR BLD AUTO: 23.2 % (ref 35.8–46.3)
HCT VFR BLD AUTO: 23.4 % (ref 35.8–46.3)
HCT VFR BLD AUTO: 23.4 % (ref 35.8–46.3)
HCT VFR BLD AUTO: 23.5 % (ref 35.8–46.3)
HCT VFR BLD AUTO: 23.6 % (ref 35.8–46.3)
HCT VFR BLD AUTO: 23.9 % (ref 35.8–46.3)
HCT VFR BLD AUTO: 23.9 % (ref 35.8–46.3)
HCT VFR BLD AUTO: 24.2 % (ref 35.8–46.3)
HCT VFR BLD AUTO: 24.3 % (ref 35.8–46.3)
HCT VFR BLD AUTO: 24.6 % (ref 35.8–46.3)
HCT VFR BLD AUTO: 24.7 % (ref 35.8–46.3)
HCT VFR BLD AUTO: 24.7 % (ref 35.8–46.3)
HCT VFR BLD AUTO: 24.8 % (ref 35.8–46.3)
HCT VFR BLD AUTO: 24.8 % (ref 35.8–46.3)
HCT VFR BLD AUTO: 25.1 % (ref 35.8–46.3)
HCT VFR BLD AUTO: 25.1 % (ref 35.8–46.3)
HCT VFR BLD AUTO: 25.4 % (ref 35.8–46.3)
HCT VFR BLD AUTO: 25.5 % (ref 35.8–46.3)
HCT VFR BLD AUTO: 25.7 % (ref 35.8–46.3)
HCT VFR BLD AUTO: 25.8 % (ref 35.8–46.3)
HCT VFR BLD AUTO: 25.8 % (ref 35.8–46.3)
HCT VFR BLD AUTO: 25.9 % (ref 35.8–46.3)
HCT VFR BLD AUTO: 26.2 % (ref 35.8–46.3)
HCT VFR BLD AUTO: 26.3 % (ref 35.8–46.3)
HCT VFR BLD AUTO: 26.3 % (ref 35.8–46.3)
HCT VFR BLD AUTO: 26.4 % (ref 35.8–46.3)
HCT VFR BLD AUTO: 26.5 % (ref 35.8–46.3)
HCT VFR BLD AUTO: 26.5 % (ref 35.8–46.3)
HCT VFR BLD AUTO: 26.8 % (ref 35.8–46.3)
HCT VFR BLD AUTO: 26.8 % (ref 35.8–46.3)
HCT VFR BLD AUTO: 26.9 % (ref 35.8–46.3)
HCT VFR BLD AUTO: 27.1 % (ref 35.8–46.3)
HCT VFR BLD AUTO: 27.2 % (ref 35.8–46.3)
HCT VFR BLD AUTO: 27.5 % (ref 35.8–46.3)
HCT VFR BLD AUTO: 27.5 % (ref 35.8–46.3)
HCT VFR BLD AUTO: 27.6 % (ref 35.8–46.3)
HCT VFR BLD AUTO: 27.7 % (ref 35.8–46.3)
HCT VFR BLD AUTO: 27.7 % (ref 35.8–46.3)
HCT VFR BLD AUTO: 27.8 % (ref 35.8–46.3)
HCT VFR BLD AUTO: 27.8 % (ref 35.8–46.3)
HCT VFR BLD AUTO: 27.9 % (ref 35.8–46.3)
HCT VFR BLD AUTO: 27.9 % (ref 35.8–46.3)
HCT VFR BLD AUTO: 28 % (ref 35.8–46.3)
HCT VFR BLD AUTO: 28 % (ref 35.8–46.3)
HCT VFR BLD AUTO: 28.2 % (ref 35.8–46.3)
HCT VFR BLD AUTO: 28.4 % (ref 35.8–46.3)
HCT VFR BLD AUTO: 28.5 % (ref 35.8–46.3)
HCT VFR BLD AUTO: 28.5 % (ref 35.8–46.3)
HCT VFR BLD AUTO: 28.6 % (ref 35.8–46.3)
HCT VFR BLD AUTO: 28.6 % (ref 35.8–46.3)
HCT VFR BLD AUTO: 28.8 % (ref 35.8–46.3)
HCT VFR BLD AUTO: 28.8 % (ref 35.8–46.3)
HCT VFR BLD AUTO: 28.9 % (ref 35.8–46.3)
HCT VFR BLD AUTO: 29 % (ref 35.8–46.3)
HCT VFR BLD AUTO: 29 % (ref 35.8–46.3)
HCT VFR BLD AUTO: 29.1 % (ref 35.8–46.3)
HCT VFR BLD AUTO: 29.2 % (ref 35.8–46.3)
HCT VFR BLD AUTO: 29.3 % (ref 35.8–46.3)
HCT VFR BLD AUTO: 29.4 % (ref 35.8–46.3)
HCT VFR BLD AUTO: 29.6 % (ref 35.8–46.3)
HCT VFR BLD AUTO: 29.7 % (ref 35.8–46.3)
HCT VFR BLD AUTO: 29.8 % (ref 35.8–46.3)
HCT VFR BLD AUTO: 29.9 % (ref 35.8–46.3)
HCT VFR BLD AUTO: 29.9 % (ref 35.8–46.3)
HCT VFR BLD AUTO: 30.1 % (ref 35.8–46.3)
HCT VFR BLD AUTO: 30.1 % (ref 35.8–46.3)
HCT VFR BLD AUTO: 30.2 % (ref 35.8–46.3)
HCT VFR BLD AUTO: 30.4 % (ref 35.8–46.3)
HCT VFR BLD AUTO: 30.5 % (ref 35.8–46.3)
HCT VFR BLD AUTO: 30.6 % (ref 35.8–46.3)
HCT VFR BLD AUTO: 30.6 % (ref 35.8–46.3)
HCT VFR BLD AUTO: 30.7 % (ref 35.8–46.3)
HCT VFR BLD AUTO: 30.8 % (ref 35.8–46.3)
HCT VFR BLD AUTO: 30.9 % (ref 35.8–46.3)
HCT VFR BLD AUTO: 30.9 % (ref 35.8–46.3)
HCT VFR BLD AUTO: 31.1 % (ref 35.8–46.3)
HCT VFR BLD AUTO: 31.4 % (ref 35.8–46.3)
HCT VFR BLD AUTO: 31.5 % (ref 35.8–46.3)
HCT VFR BLD AUTO: 31.6 % (ref 35.8–46.3)
HCT VFR BLD AUTO: 31.9 % (ref 35.8–46.3)
HCT VFR BLD AUTO: 31.9 % (ref 35.8–46.3)
HCT VFR BLD AUTO: 32.7 % (ref 35.8–46.3)
HCT VFR BLD AUTO: 34.4 % (ref 35.8–46.3)
HGB BLD-MCNC: 10 G/DL (ref 11.7–15.4)
HGB BLD-MCNC: 10.1 G/DL (ref 11.7–15.4)
HGB BLD-MCNC: 10.2 G/DL (ref 11.7–15.4)
HGB BLD-MCNC: 10.2 G/DL (ref 11.7–15.4)
HGB BLD-MCNC: 10.3 G/DL (ref 11.7–15.4)
HGB BLD-MCNC: 10.3 G/DL (ref 11.7–15.4)
HGB BLD-MCNC: 10.4 G/DL (ref 11.7–15.4)
HGB BLD-MCNC: 10.5 G/DL (ref 11.7–15.4)
HGB BLD-MCNC: 10.6 G/DL (ref 11.7–15.4)
HGB BLD-MCNC: 10.8 G/DL (ref 11.7–15.4)
HGB BLD-MCNC: 11.5 G/DL (ref 11.7–15.4)
HGB BLD-MCNC: 6.2 G/DL (ref 11.7–15.4)
HGB BLD-MCNC: 6.3 G/DL (ref 11.7–15.4)
HGB BLD-MCNC: 6.3 G/DL (ref 11.7–15.4)
HGB BLD-MCNC: 6.5 G/DL (ref 11.7–15.4)
HGB BLD-MCNC: 6.6 G/DL (ref 11.7–15.4)
HGB BLD-MCNC: 6.7 G/DL (ref 11.7–15.4)
HGB BLD-MCNC: 6.8 G/DL (ref 11.7–15.4)
HGB BLD-MCNC: 6.8 G/DL (ref 11.7–15.4)
HGB BLD-MCNC: 6.9 G/DL (ref 11.7–15.4)
HGB BLD-MCNC: 7 G/DL (ref 11.7–15.4)
HGB BLD-MCNC: 7.1 G/DL (ref 11.7–15.4)
HGB BLD-MCNC: 7.2 G/DL (ref 11.7–15.4)
HGB BLD-MCNC: 7.3 G/DL (ref 11.7–15.4)
HGB BLD-MCNC: 7.4 G/DL (ref 11.7–15.4)
HGB BLD-MCNC: 7.4 G/DL (ref 11.7–15.4)
HGB BLD-MCNC: 7.5 G/DL (ref 11.7–15.4)
HGB BLD-MCNC: 7.5 G/DL (ref 11.7–15.4)
HGB BLD-MCNC: 7.6 G/DL (ref 11.7–15.4)
HGB BLD-MCNC: 7.7 G/DL (ref 11.7–15.4)
HGB BLD-MCNC: 7.8 G/DL (ref 11.7–15.4)
HGB BLD-MCNC: 7.8 G/DL (ref 11.7–15.4)
HGB BLD-MCNC: 7.9 G/DL (ref 11.7–15.4)
HGB BLD-MCNC: 8 G/DL (ref 11.7–15.4)
HGB BLD-MCNC: 8.1 G/DL (ref 11.7–15.4)
HGB BLD-MCNC: 8.2 G/DL (ref 11.7–15.4)
HGB BLD-MCNC: 8.2 G/DL (ref 11.7–15.4)
HGB BLD-MCNC: 8.3 G/DL (ref 11.7–15.4)
HGB BLD-MCNC: 8.4 G/DL (ref 11.7–15.4)
HGB BLD-MCNC: 8.5 G/DL (ref 11.7–15.4)
HGB BLD-MCNC: 8.6 G/DL (ref 11.7–15.4)
HGB BLD-MCNC: 8.7 G/DL (ref 11.7–15.4)
HGB BLD-MCNC: 8.8 G/DL (ref 11.7–15.4)
HGB BLD-MCNC: 8.9 G/DL (ref 11.7–15.4)
HGB BLD-MCNC: 8.9 G/DL (ref 11.7–15.4)
HGB BLD-MCNC: 9 G/DL (ref 11.7–15.4)
HGB BLD-MCNC: 9.1 G/DL (ref 11.7–15.4)
HGB BLD-MCNC: 9.2 G/DL (ref 11.7–15.4)
HGB BLD-MCNC: 9.3 G/DL (ref 11.7–15.4)
HGB BLD-MCNC: 9.4 G/DL (ref 11.7–15.4)
HGB BLD-MCNC: 9.5 G/DL (ref 11.7–15.4)
HGB BLD-MCNC: 9.6 G/DL (ref 11.7–15.4)
HGB BLD-MCNC: 9.7 G/DL (ref 11.7–15.4)
HGB BLD-MCNC: 9.8 G/DL (ref 11.7–15.4)
HGB BLD-MCNC: 9.9 G/DL (ref 11.7–15.4)
HGB UR QL STRIP: ABNORMAL
HGB UR QL STRIP: ABNORMAL
HISTORY CHECKED?,CKHIST: NORMAL
IMM GRANULOCYTES # BLD AUTO: 0 K/UL (ref 0–0.5)
IMM GRANULOCYTES # BLD AUTO: 0.1 K/UL (ref 0–0.5)
IMM GRANULOCYTES # BLD AUTO: 0.2 K/UL (ref 0–0.5)
IMM GRANULOCYTES # BLD AUTO: 0.3 K/UL (ref 0–0.5)
IMM GRANULOCYTES # BLD AUTO: 0.3 K/UL (ref 0–0.5)
IMM GRANULOCYTES # BLD AUTO: 0.4 K/UL (ref 0–0.5)
IMM GRANULOCYTES # BLD AUTO: 0.6 K/UL (ref 0–0.5)
IMM GRANULOCYTES NFR BLD AUTO: 0 % (ref 0–5)
IMM GRANULOCYTES NFR BLD AUTO: 1 % (ref 0–5)
IMM GRANULOCYTES NFR BLD AUTO: 11 % (ref 0–5)
IMM GRANULOCYTES NFR BLD AUTO: 11 % (ref 0–5)
IMM GRANULOCYTES NFR BLD AUTO: 2 % (ref 0–5)
IMM GRANULOCYTES NFR BLD AUTO: 3 % (ref 0–5)
IMM GRANULOCYTES NFR BLD AUTO: 4 % (ref 0–5)
IMM GRANULOCYTES NFR BLD AUTO: 5 % (ref 0–5)
IMM GRANULOCYTES NFR BLD AUTO: 6 % (ref 0–5)
IMM GRANULOCYTES NFR BLD AUTO: 7 % (ref 0–5)
IMM GRANULOCYTES NFR BLD AUTO: 8 % (ref 0–5)
IMM GRANULOCYTES NFR BLD AUTO: 8 % (ref 0–5)
IMM GRANULOCYTES NFR BLD AUTO: 9 % (ref 0–5)
IMM GRANULOCYTES NFR BLD AUTO: 9 % (ref 0–5)
KETONES UR QL STRIP.AUTO: NEGATIVE MG/DL
KETONES UR QL STRIP.AUTO: NEGATIVE MG/DL
LDH SERPL L TO P-CCNC: 114 U/L (ref 110–210)
LDH SERPL L TO P-CCNC: 114 U/L (ref 110–210)
LDH SERPL L TO P-CCNC: 115 U/L (ref 110–210)
LDH SERPL L TO P-CCNC: 117 U/L (ref 110–210)
LDH SERPL L TO P-CCNC: 125 U/L (ref 110–210)
LDH SERPL L TO P-CCNC: 126 U/L (ref 110–210)
LDH SERPL L TO P-CCNC: 129 U/L (ref 110–210)
LDH SERPL L TO P-CCNC: 135 U/L (ref 110–210)
LDH SERPL L TO P-CCNC: 155 U/L (ref 110–210)
LEUKOCYTE ESTERASE UR QL STRIP.AUTO: ABNORMAL
LEUKOCYTE ESTERASE UR QL STRIP.AUTO: NEGATIVE
LYMPHOCYTES # BLD: 0 K/UL (ref 0.5–4.6)
LYMPHOCYTES # BLD: 0.1 K/UL (ref 0.5–4.6)
LYMPHOCYTES # BLD: 0.2 K/UL (ref 0.5–4.6)
LYMPHOCYTES # BLD: 0.3 K/UL (ref 0.5–4.6)
LYMPHOCYTES # BLD: 0.4 K/UL (ref 0.5–4.6)
LYMPHOCYTES # BLD: 0.5 K/UL (ref 0.5–4.6)
LYMPHOCYTES # BLD: 0.6 K/UL (ref 0.5–4.6)
LYMPHOCYTES # BLD: 0.7 K/UL (ref 0.5–4.6)
LYMPHOCYTES # BLD: 0.8 K/UL (ref 0.5–4.6)
LYMPHOCYTES # BLD: 0.8 K/UL (ref 0.5–4.6)
LYMPHOCYTES # BLD: 0.9 K/UL (ref 0.5–4.6)
LYMPHOCYTES # BLD: 1.1 K/UL (ref 0.5–4.6)
LYMPHOCYTES # BLD: 1.2 K/UL (ref 0.5–4.6)
LYMPHOCYTES # BLD: 1.2 K/UL (ref 0.5–4.6)
LYMPHOCYTES # BLD: 1.3 K/UL (ref 0.5–4.6)
LYMPHOCYTES # BLD: 1.3 K/UL (ref 0.5–4.6)
LYMPHOCYTES # BLD: 1.5 K/UL (ref 0.5–4.6)
LYMPHOCYTES # BLD: 5.5 K/UL (ref 0.5–4.6)
LYMPHOCYTES NFR BLD MANUAL: 15 % (ref 16–44)
LYMPHOCYTES NFR BLD MANUAL: 17 % (ref 16–44)
LYMPHOCYTES NFR BLD MANUAL: 18 % (ref 16–44)
LYMPHOCYTES NFR BLD MANUAL: 20 % (ref 16–44)
LYMPHOCYTES NFR BLD MANUAL: 28 % (ref 16–44)
LYMPHOCYTES NFR BLD MANUAL: 5 % (ref 16–44)
LYMPHOCYTES NFR BLD MANUAL: 6 % (ref 16–44)
LYMPHOCYTES NFR BLD MANUAL: 7 % (ref 16–44)
LYMPHOCYTES NFR BLD MANUAL: 8 % (ref 16–44)
LYMPHOCYTES NFR BLD: 1 % (ref 13–44)
LYMPHOCYTES NFR BLD: 1 % (ref 13–44)
LYMPHOCYTES NFR BLD: 10 % (ref 13–44)
LYMPHOCYTES NFR BLD: 11 % (ref 13–44)
LYMPHOCYTES NFR BLD: 11 % (ref 13–44)
LYMPHOCYTES NFR BLD: 12 % (ref 13–44)
LYMPHOCYTES NFR BLD: 15 % (ref 13–44)
LYMPHOCYTES NFR BLD: 15 % (ref 13–44)
LYMPHOCYTES NFR BLD: 16 % (ref 13–44)
LYMPHOCYTES NFR BLD: 16 % (ref 13–44)
LYMPHOCYTES NFR BLD: 17 % (ref 13–44)
LYMPHOCYTES NFR BLD: 18 % (ref 13–44)
LYMPHOCYTES NFR BLD: 18 % (ref 13–44)
LYMPHOCYTES NFR BLD: 19 % (ref 13–44)
LYMPHOCYTES NFR BLD: 19 % (ref 13–44)
LYMPHOCYTES NFR BLD: 2 % (ref 13–44)
LYMPHOCYTES NFR BLD: 20 % (ref 13–44)
LYMPHOCYTES NFR BLD: 22 % (ref 13–44)
LYMPHOCYTES NFR BLD: 22 % (ref 13–44)
LYMPHOCYTES NFR BLD: 23 % (ref 13–44)
LYMPHOCYTES NFR BLD: 23 % (ref 13–44)
LYMPHOCYTES NFR BLD: 25 % (ref 13–44)
LYMPHOCYTES NFR BLD: 26 % (ref 13–44)
LYMPHOCYTES NFR BLD: 26 % (ref 13–44)
LYMPHOCYTES NFR BLD: 28 % (ref 13–44)
LYMPHOCYTES NFR BLD: 28 % (ref 13–44)
LYMPHOCYTES NFR BLD: 3 % (ref 13–44)
LYMPHOCYTES NFR BLD: 30 % (ref 13–44)
LYMPHOCYTES NFR BLD: 30 % (ref 13–44)
LYMPHOCYTES NFR BLD: 31 % (ref 13–44)
LYMPHOCYTES NFR BLD: 33 % (ref 13–44)
LYMPHOCYTES NFR BLD: 36 % (ref 13–44)
LYMPHOCYTES NFR BLD: 38 % (ref 13–44)
LYMPHOCYTES NFR BLD: 4 % (ref 13–44)
LYMPHOCYTES NFR BLD: 44 % (ref 13–44)
LYMPHOCYTES NFR BLD: 45 % (ref 13–44)
LYMPHOCYTES NFR BLD: 48 % (ref 13–44)
LYMPHOCYTES NFR BLD: 48 % (ref 13–44)
LYMPHOCYTES NFR BLD: 49 % (ref 13–44)
LYMPHOCYTES NFR BLD: 5 % (ref 13–44)
LYMPHOCYTES NFR BLD: 50 % (ref 13–44)
LYMPHOCYTES NFR BLD: 6 % (ref 13–44)
LYMPHOCYTES NFR BLD: 7 % (ref 13–44)
LYMPHOCYTES NFR BLD: 8 % (ref 13–44)
LYMPHOCYTES NFR BLD: 9 % (ref 13–44)
MAGNESIUM SERPL-MCNC: 1.2 MG/DL (ref 1.8–2.4)
MAGNESIUM SERPL-MCNC: 1.6 MG/DL (ref 1.8–2.4)
MAGNESIUM SERPL-MCNC: 1.7 MG/DL (ref 1.8–2.4)
MAGNESIUM SERPL-MCNC: 1.8 MG/DL (ref 1.8–2.4)
MAGNESIUM SERPL-MCNC: 1.9 MG/DL (ref 1.8–2.4)
MAGNESIUM SERPL-MCNC: 2 MG/DL (ref 1.8–2.4)
MAGNESIUM SERPL-MCNC: 2.1 MG/DL (ref 1.8–2.4)
MAGNESIUM SERPL-MCNC: 2.2 MG/DL (ref 1.8–2.4)
MAGNESIUM SERPL-MCNC: 2.3 MG/DL (ref 1.8–2.4)
MAGNESIUM SERPL-MCNC: 2.4 MG/DL (ref 1.8–2.4)
MAGNESIUM SERPL-MCNC: 2.4 MG/DL (ref 1.8–2.4)
MAGNESIUM SERPL-MCNC: 2.5 MG/DL (ref 1.8–2.4)
MCH RBC QN AUTO: 30.8 PG (ref 26.1–32.9)
MCH RBC QN AUTO: 30.9 PG (ref 26.1–32.9)
MCH RBC QN AUTO: 31 PG (ref 26.1–32.9)
MCH RBC QN AUTO: 31.1 PG (ref 26.1–32.9)
MCH RBC QN AUTO: 31.1 PG (ref 26.1–32.9)
MCH RBC QN AUTO: 31.3 PG (ref 26.1–32.9)
MCH RBC QN AUTO: 31.4 PG (ref 26.1–32.9)
MCH RBC QN AUTO: 31.4 PG (ref 26.1–32.9)
MCH RBC QN AUTO: 31.5 PG (ref 26.1–32.9)
MCH RBC QN AUTO: 31.5 PG (ref 26.1–32.9)
MCH RBC QN AUTO: 31.6 PG (ref 26.1–32.9)
MCH RBC QN AUTO: 31.7 PG (ref 26.1–32.9)
MCH RBC QN AUTO: 31.8 PG (ref 26.1–32.9)
MCH RBC QN AUTO: 31.8 PG (ref 26.1–32.9)
MCH RBC QN AUTO: 31.9 PG (ref 26.1–32.9)
MCH RBC QN AUTO: 32 PG (ref 26.1–32.9)
MCH RBC QN AUTO: 32.1 PG (ref 26.1–32.9)
MCH RBC QN AUTO: 32.1 PG (ref 26.1–32.9)
MCH RBC QN AUTO: 32.2 PG (ref 26.1–32.9)
MCH RBC QN AUTO: 32.3 PG (ref 26.1–32.9)
MCH RBC QN AUTO: 32.5 PG (ref 26.1–32.9)
MCH RBC QN AUTO: 32.6 PG (ref 26.1–32.9)
MCH RBC QN AUTO: 32.7 PG (ref 26.1–32.9)
MCH RBC QN AUTO: 33 PG (ref 26.1–32.9)
MCH RBC QN AUTO: 33.1 PG (ref 26.1–32.9)
MCH RBC QN AUTO: 33.1 PG (ref 26.1–32.9)
MCH RBC QN AUTO: 33.2 PG (ref 26.1–32.9)
MCH RBC QN AUTO: 33.3 PG (ref 26.1–32.9)
MCH RBC QN AUTO: 33.3 PG (ref 26.1–32.9)
MCH RBC QN AUTO: 33.5 PG (ref 26.1–32.9)
MCH RBC QN AUTO: 33.5 PG (ref 26.1–32.9)
MCH RBC QN AUTO: 33.7 PG (ref 26.1–32.9)
MCH RBC QN AUTO: 33.8 PG (ref 26.1–32.9)
MCH RBC QN AUTO: 33.9 PG (ref 26.1–32.9)
MCH RBC QN AUTO: 33.9 PG (ref 26.1–32.9)
MCH RBC QN AUTO: 34.1 PG (ref 26.1–32.9)
MCH RBC QN AUTO: 34.2 PG (ref 26.1–32.9)
MCH RBC QN AUTO: 34.3 PG (ref 26.1–32.9)
MCH RBC QN AUTO: 34.4 PG (ref 26.1–32.9)
MCH RBC QN AUTO: 34.4 PG (ref 26.1–32.9)
MCH RBC QN AUTO: 34.7 PG (ref 26.1–32.9)
MCH RBC QN AUTO: 34.9 PG (ref 26.1–32.9)
MCH RBC QN AUTO: 35.1 PG (ref 26.1–32.9)
MCH RBC QN AUTO: 35.2 PG (ref 26.1–32.9)
MCH RBC QN AUTO: 35.4 PG (ref 26.1–32.9)
MCH RBC QN AUTO: 35.5 PG (ref 26.1–32.9)
MCH RBC QN AUTO: 35.7 PG (ref 26.1–32.9)
MCH RBC QN AUTO: 35.8 PG (ref 26.1–32.9)
MCH RBC QN AUTO: 36.2 PG (ref 26.1–32.9)
MCH RBC QN AUTO: 36.3 PG (ref 26.1–32.9)
MCH RBC QN AUTO: 36.4 PG (ref 26.1–32.9)
MCH RBC QN AUTO: 36.5 PG (ref 26.1–32.9)
MCH RBC QN AUTO: 36.5 PG (ref 26.1–32.9)
MCH RBC QN AUTO: 36.6 PG (ref 26.1–32.9)
MCH RBC QN AUTO: 36.6 PG (ref 26.1–32.9)
MCH RBC QN AUTO: 36.7 PG (ref 26.1–32.9)
MCH RBC QN AUTO: 36.7 PG (ref 26.1–32.9)
MCH RBC QN AUTO: 36.8 PG (ref 26.1–32.9)
MCH RBC QN AUTO: 36.8 PG (ref 26.1–32.9)
MCH RBC QN AUTO: 36.9 PG (ref 26.1–32.9)
MCH RBC QN AUTO: 36.9 PG (ref 26.1–32.9)
MCH RBC QN AUTO: 37 PG (ref 26.1–32.9)
MCH RBC QN AUTO: 37 PG (ref 26.1–32.9)
MCH RBC QN AUTO: 37.1 PG (ref 26.1–32.9)
MCH RBC QN AUTO: 37.2 PG (ref 26.1–32.9)
MCH RBC QN AUTO: 37.3 PG (ref 26.1–32.9)
MCH RBC QN AUTO: 37.8 PG (ref 26.1–32.9)
MCH RBC QN AUTO: 37.8 PG (ref 26.1–32.9)
MCHC RBC AUTO-ENTMCNC: 32 G/DL (ref 31.4–35)
MCHC RBC AUTO-ENTMCNC: 32 G/DL (ref 31.4–35)
MCHC RBC AUTO-ENTMCNC: 32.1 G/DL (ref 31.4–35)
MCHC RBC AUTO-ENTMCNC: 32.2 G/DL (ref 31.4–35)
MCHC RBC AUTO-ENTMCNC: 32.3 G/DL (ref 31.4–35)
MCHC RBC AUTO-ENTMCNC: 32.4 G/DL (ref 31.4–35)
MCHC RBC AUTO-ENTMCNC: 32.5 G/DL (ref 31.4–35)
MCHC RBC AUTO-ENTMCNC: 32.6 G/DL (ref 31.4–35)
MCHC RBC AUTO-ENTMCNC: 32.7 G/DL (ref 31.4–35)
MCHC RBC AUTO-ENTMCNC: 32.8 G/DL (ref 31.4–35)
MCHC RBC AUTO-ENTMCNC: 32.9 G/DL (ref 31.4–35)
MCHC RBC AUTO-ENTMCNC: 33 G/DL (ref 31.4–35)
MCHC RBC AUTO-ENTMCNC: 33.1 G/DL (ref 31.4–35)
MCHC RBC AUTO-ENTMCNC: 33.2 G/DL (ref 31.4–35)
MCHC RBC AUTO-ENTMCNC: 33.3 G/DL (ref 31.4–35)
MCHC RBC AUTO-ENTMCNC: 33.4 G/DL (ref 31.4–35)
MCHC RBC AUTO-ENTMCNC: 33.5 G/DL (ref 31.4–35)
MCHC RBC AUTO-ENTMCNC: 33.6 G/DL (ref 31.4–35)
MCHC RBC AUTO-ENTMCNC: 33.7 G/DL (ref 31.4–35)
MCHC RBC AUTO-ENTMCNC: 33.8 G/DL (ref 31.4–35)
MCHC RBC AUTO-ENTMCNC: 33.9 G/DL (ref 31.4–35)
MCHC RBC AUTO-ENTMCNC: 33.9 G/DL (ref 31.4–35)
MCHC RBC AUTO-ENTMCNC: 34 G/DL (ref 31.4–35)
MCHC RBC AUTO-ENTMCNC: 34.1 G/DL (ref 31.4–35)
MCHC RBC AUTO-ENTMCNC: 34.3 G/DL (ref 31.4–35)
MCHC RBC AUTO-ENTMCNC: 34.4 G/DL (ref 31.4–35)
MCV RBC AUTO: 100 FL (ref 79.6–97.8)
MCV RBC AUTO: 100.4 FL (ref 79.6–97.8)
MCV RBC AUTO: 100.5 FL (ref 79.6–97.8)
MCV RBC AUTO: 100.8 FL (ref 79.6–97.8)
MCV RBC AUTO: 100.9 FL (ref 79.6–97.8)
MCV RBC AUTO: 101 FL (ref 79.6–97.8)
MCV RBC AUTO: 101.1 FL (ref 79.6–97.8)
MCV RBC AUTO: 101.4 FL (ref 79.6–97.8)
MCV RBC AUTO: 101.5 FL (ref 79.6–97.8)
MCV RBC AUTO: 101.6 FL (ref 79.6–97.8)
MCV RBC AUTO: 101.6 FL (ref 79.6–97.8)
MCV RBC AUTO: 101.8 FL (ref 79.6–97.8)
MCV RBC AUTO: 101.8 FL (ref 79.6–97.8)
MCV RBC AUTO: 102 FL (ref 79.6–97.8)
MCV RBC AUTO: 102.5 FL (ref 79.6–97.8)
MCV RBC AUTO: 102.6 FL (ref 79.6–97.8)
MCV RBC AUTO: 103.6 FL (ref 79.6–97.8)
MCV RBC AUTO: 103.8 FL (ref 79.6–97.8)
MCV RBC AUTO: 104 FL (ref 79.6–97.8)
MCV RBC AUTO: 104.1 FL (ref 79.6–97.8)
MCV RBC AUTO: 104.9 FL (ref 79.6–97.8)
MCV RBC AUTO: 106.4 FL (ref 79.6–97.8)
MCV RBC AUTO: 106.6 FL (ref 79.6–97.8)
MCV RBC AUTO: 107.5 FL (ref 79.6–97.8)
MCV RBC AUTO: 108 FL (ref 79.6–97.8)
MCV RBC AUTO: 108.8 FL (ref 79.6–97.8)
MCV RBC AUTO: 108.9 FL (ref 79.6–97.8)
MCV RBC AUTO: 108.9 FL (ref 79.6–97.8)
MCV RBC AUTO: 109 FL (ref 79.6–97.8)
MCV RBC AUTO: 109 FL (ref 79.6–97.8)
MCV RBC AUTO: 109.2 FL (ref 79.6–97.8)
MCV RBC AUTO: 109.3 FL (ref 79.6–97.8)
MCV RBC AUTO: 109.5 FL (ref 79.6–97.8)
MCV RBC AUTO: 109.6 FL (ref 79.6–97.8)
MCV RBC AUTO: 109.6 FL (ref 79.6–97.8)
MCV RBC AUTO: 109.9 FL (ref 79.6–97.8)
MCV RBC AUTO: 110 FL (ref 79.6–97.8)
MCV RBC AUTO: 110.6 FL (ref 79.6–97.8)
MCV RBC AUTO: 110.9 FL (ref 79.6–97.8)
MCV RBC AUTO: 111 FL (ref 79.6–97.8)
MCV RBC AUTO: 111 FL (ref 79.6–97.8)
MCV RBC AUTO: 111.2 FL (ref 79.6–97.8)
MCV RBC AUTO: 111.3 FL (ref 79.6–97.8)
MCV RBC AUTO: 111.3 FL (ref 79.6–97.8)
MCV RBC AUTO: 111.5 FL (ref 79.6–97.8)
MCV RBC AUTO: 111.9 FL (ref 79.6–97.8)
MCV RBC AUTO: 111.9 FL (ref 79.6–97.8)
MCV RBC AUTO: 112.1 FL (ref 79.6–97.8)
MCV RBC AUTO: 112.1 FL (ref 79.6–97.8)
MCV RBC AUTO: 112.3 FL (ref 79.6–97.8)
MCV RBC AUTO: 112.3 FL (ref 79.6–97.8)
MCV RBC AUTO: 112.4 FL (ref 79.6–97.8)
MCV RBC AUTO: 112.7 FL (ref 79.6–97.8)
MCV RBC AUTO: 112.9 FL (ref 79.6–97.8)
MCV RBC AUTO: 113 FL (ref 79.6–97.8)
MCV RBC AUTO: 113.1 FL (ref 79.6–97.8)
MCV RBC AUTO: 113.3 FL (ref 79.6–97.8)
MCV RBC AUTO: 113.4 FL (ref 79.6–97.8)
MCV RBC AUTO: 113.8 FL (ref 79.6–97.8)
MCV RBC AUTO: 113.9 FL (ref 79.6–97.8)
MCV RBC AUTO: 113.9 FL (ref 79.6–97.8)
MCV RBC AUTO: 92.6 FL (ref 79.6–97.8)
MCV RBC AUTO: 93.2 FL (ref 79.6–97.8)
MCV RBC AUTO: 93.2 FL (ref 79.6–97.8)
MCV RBC AUTO: 93.4 FL (ref 79.6–97.8)
MCV RBC AUTO: 93.8 FL (ref 79.6–97.8)
MCV RBC AUTO: 93.9 FL (ref 79.6–97.8)
MCV RBC AUTO: 93.9 FL (ref 79.6–97.8)
MCV RBC AUTO: 94 FL (ref 79.6–97.8)
MCV RBC AUTO: 94.8 FL (ref 79.6–97.8)
MCV RBC AUTO: 94.9 FL (ref 79.6–97.8)
MCV RBC AUTO: 94.9 FL (ref 79.6–97.8)
MCV RBC AUTO: 95 FL (ref 79.6–97.8)
MCV RBC AUTO: 95.1 FL (ref 79.6–97.8)
MCV RBC AUTO: 95.2 FL (ref 79.6–97.8)
MCV RBC AUTO: 95.3 FL (ref 79.6–97.8)
MCV RBC AUTO: 95.3 FL (ref 79.6–97.8)
MCV RBC AUTO: 95.5 FL (ref 79.6–97.8)
MCV RBC AUTO: 95.7 FL (ref 79.6–97.8)
MCV RBC AUTO: 95.8 FL (ref 79.6–97.8)
MCV RBC AUTO: 95.9 FL (ref 79.6–97.8)
MCV RBC AUTO: 96 FL (ref 79.6–97.8)
MCV RBC AUTO: 96.1 FL (ref 79.6–97.8)
MCV RBC AUTO: 96.2 FL (ref 79.6–97.8)
MCV RBC AUTO: 96.4 FL (ref 79.6–97.8)
MCV RBC AUTO: 96.7 FL (ref 79.6–97.8)
MCV RBC AUTO: 96.7 FL (ref 79.6–97.8)
MCV RBC AUTO: 97.2 FL (ref 79.6–97.8)
MCV RBC AUTO: 97.2 FL (ref 79.6–97.8)
MCV RBC AUTO: 97.5 FL (ref 79.6–97.8)
MCV RBC AUTO: 97.8 FL (ref 79.6–97.8)
MCV RBC AUTO: 97.9 FL (ref 79.6–97.8)
MCV RBC AUTO: 98.1 FL (ref 79.6–97.8)
MCV RBC AUTO: 98.2 FL (ref 79.6–97.8)
MCV RBC AUTO: 98.3 FL (ref 79.6–97.8)
MCV RBC AUTO: 98.4 FL (ref 79.6–97.8)
MCV RBC AUTO: 98.5 FL (ref 79.6–97.8)
MCV RBC AUTO: 98.7 FL (ref 79.6–97.8)
MCV RBC AUTO: 98.7 FL (ref 79.6–97.8)
MCV RBC AUTO: 98.8 FL (ref 79.6–97.8)
MCV RBC AUTO: 99.2 FL (ref 79.6–97.8)
MCV RBC AUTO: 99.3 FL (ref 79.6–97.8)
MCV RBC AUTO: 99.5 FL (ref 79.6–97.8)
MCV RBC AUTO: 99.6 FL (ref 79.6–97.8)
MCV RBC AUTO: 99.7 FL (ref 79.6–97.8)
METAMYELOCYTES NFR BLD MANUAL: 2 %
METAMYELOCYTES NFR BLD MANUAL: 2 %
METAMYELOCYTES NFR BLD MANUAL: 3 %
METAMYELOCYTES NFR BLD MANUAL: 4 %
MONOCYTES # BLD: 0 K/UL (ref 0.1–1.3)
MONOCYTES # BLD: 0.1 K/UL (ref 0.1–1.3)
MONOCYTES # BLD: 0.2 K/UL (ref 0.1–1.3)
MONOCYTES # BLD: 0.3 K/UL (ref 0.1–1.3)
MONOCYTES # BLD: 0.4 K/UL (ref 0.1–1.3)
MONOCYTES # BLD: 0.5 K/UL (ref 0.1–1.3)
MONOCYTES # BLD: 0.7 K/UL (ref 0.1–1.3)
MONOCYTES # BLD: 0.8 K/UL (ref 0.1–1.3)
MONOCYTES # BLD: 0.9 K/UL (ref 0.1–1.3)
MONOCYTES # BLD: 1 K/UL (ref 0.1–1.3)
MONOCYTES # BLD: 1.1 K/UL (ref 0.1–1.3)
MONOCYTES # BLD: 1.2 K/UL (ref 0.1–1.3)
MONOCYTES # BLD: 1.3 K/UL (ref 0.1–1.3)
MONOCYTES # BLD: 1.4 K/UL (ref 0.1–1.3)
MONOCYTES # BLD: 1.5 K/UL (ref 0.1–1.3)
MONOCYTES # BLD: 1.6 K/UL (ref 0.1–1.3)
MONOCYTES # BLD: 1.7 K/UL (ref 0.1–1.3)
MONOCYTES # BLD: 1.7 K/UL (ref 0.1–1.3)
MONOCYTES # BLD: 1.8 K/UL (ref 0.1–1.3)
MONOCYTES # BLD: 1.9 K/UL (ref 0.1–1.3)
MONOCYTES # BLD: 2 K/UL (ref 0.1–1.3)
MONOCYTES # BLD: 2.1 K/UL (ref 0.1–1.3)
MONOCYTES # BLD: 2.2 K/UL (ref 0.1–1.3)
MONOCYTES # BLD: 2.5 K/UL (ref 0.1–1.3)
MONOCYTES # BLD: 2.6 K/UL (ref 0.1–1.3)
MONOCYTES # BLD: 2.6 K/UL (ref 0.1–1.3)
MONOCYTES # BLD: 2.8 K/UL (ref 0.1–1.3)
MONOCYTES # BLD: 3 K/UL (ref 0.1–1.3)
MONOCYTES # BLD: 3.1 K/UL (ref 0.1–1.3)
MONOCYTES # BLD: 3.1 K/UL (ref 0.1–1.3)
MONOCYTES # BLD: 3.3 K/UL (ref 0.1–1.3)
MONOCYTES # BLD: 4.1 K/UL (ref 0.1–1.3)
MONOCYTES NFR BLD MANUAL: 18 % (ref 3–9)
MONOCYTES NFR BLD MANUAL: 2 % (ref 3–9)
MONOCYTES NFR BLD MANUAL: 20 % (ref 3–9)
MONOCYTES NFR BLD MANUAL: 3 % (ref 3–9)
MONOCYTES NFR BLD MANUAL: 32 % (ref 3–9)
MONOCYTES NFR BLD MANUAL: 36 % (ref 3–9)
MONOCYTES NFR BLD MANUAL: 4 % (ref 3–9)
MONOCYTES NFR BLD MANUAL: 4 % (ref 3–9)
MONOCYTES NFR BLD: 0 % (ref 4–12)
MONOCYTES NFR BLD: 10 % (ref 4–12)
MONOCYTES NFR BLD: 10 % (ref 4–12)
MONOCYTES NFR BLD: 11 % (ref 4–12)
MONOCYTES NFR BLD: 12 % (ref 4–12)
MONOCYTES NFR BLD: 12 % (ref 4–12)
MONOCYTES NFR BLD: 13 % (ref 4–12)
MONOCYTES NFR BLD: 13 % (ref 4–12)
MONOCYTES NFR BLD: 14 % (ref 4–12)
MONOCYTES NFR BLD: 15 % (ref 4–12)
MONOCYTES NFR BLD: 16 % (ref 4–12)
MONOCYTES NFR BLD: 17 % (ref 4–12)
MONOCYTES NFR BLD: 18 % (ref 4–12)
MONOCYTES NFR BLD: 18 % (ref 4–12)
MONOCYTES NFR BLD: 20 % (ref 4–12)
MONOCYTES NFR BLD: 21 % (ref 4–12)
MONOCYTES NFR BLD: 22 % (ref 4–12)
MONOCYTES NFR BLD: 24 % (ref 4–12)
MONOCYTES NFR BLD: 24 % (ref 4–12)
MONOCYTES NFR BLD: 25 % (ref 4–12)
MONOCYTES NFR BLD: 28 % (ref 4–12)
MONOCYTES NFR BLD: 29 % (ref 4–12)
MONOCYTES NFR BLD: 32 % (ref 4–12)
MONOCYTES NFR BLD: 32 % (ref 4–12)
MONOCYTES NFR BLD: 33 % (ref 4–12)
MONOCYTES NFR BLD: 34 % (ref 4–12)
MONOCYTES NFR BLD: 34 % (ref 4–12)
MONOCYTES NFR BLD: 35 % (ref 4–12)
MONOCYTES NFR BLD: 38 % (ref 4–12)
MONOCYTES NFR BLD: 40 % (ref 4–12)
MONOCYTES NFR BLD: 41 % (ref 4–12)
MONOCYTES NFR BLD: 41 % (ref 4–12)
MONOCYTES NFR BLD: 43 % (ref 4–12)
MONOCYTES NFR BLD: 43 % (ref 4–12)
MONOCYTES NFR BLD: 44 % (ref 4–12)
MONOCYTES NFR BLD: 45 % (ref 4–12)
MONOCYTES NFR BLD: 46 % (ref 4–12)
MONOCYTES NFR BLD: 46 % (ref 4–12)
MONOCYTES NFR BLD: 47 % (ref 4–12)
MONOCYTES NFR BLD: 48 % (ref 4–12)
MONOCYTES NFR BLD: 48 % (ref 4–12)
MONOCYTES NFR BLD: 49 % (ref 4–12)
MONOCYTES NFR BLD: 51 % (ref 4–12)
MONOCYTES NFR BLD: 52 % (ref 4–12)
MONOCYTES NFR BLD: 55 % (ref 4–12)
MONOCYTES NFR BLD: 55 % (ref 4–12)
MONOCYTES NFR BLD: 56 % (ref 4–12)
MONOCYTES NFR BLD: 56 % (ref 4–12)
MONOCYTES NFR BLD: 57 % (ref 4–12)
MONOCYTES NFR BLD: 58 % (ref 4–12)
MONOCYTES NFR BLD: 58 % (ref 4–12)
MONOCYTES NFR BLD: 59 % (ref 4–12)
MONOCYTES NFR BLD: 6 % (ref 4–12)
MONOCYTES NFR BLD: 6 % (ref 4–12)
MONOCYTES NFR BLD: 60 % (ref 4–12)
MONOCYTES NFR BLD: 60 % (ref 4–12)
MONOCYTES NFR BLD: 61 % (ref 4–12)
MONOCYTES NFR BLD: 63 % (ref 4–12)
MONOCYTES NFR BLD: 65 % (ref 4–12)
MONOCYTES NFR BLD: 72 % (ref 4–12)
MONOCYTES NFR BLD: 9 % (ref 4–12)
MUCOUS THREADS URNS QL MICRO: 0 /LPF
MUCOUS THREADS URNS QL MICRO: 0 /LPF
MYELOCYTES NFR BLD MANUAL: 1 %
MYELOCYTES NFR BLD MANUAL: 1 %
MYELOCYTES NFR BLD MANUAL: 11 %
MYELOCYTES NFR BLD MANUAL: 4 %
MYELOCYTES NFR BLD MANUAL: 4 %
NEUTS BAND NFR BLD MANUAL: 13 % (ref 0–6)
NEUTS BAND NFR BLD MANUAL: 2 % (ref 0–10)
NEUTS BAND NFR BLD MANUAL: 2 % (ref 0–10)
NEUTS SEG # BLD: 0 K/UL (ref 1.7–8.2)
NEUTS SEG # BLD: 0.1 K/UL (ref 1.7–8.2)
NEUTS SEG # BLD: 0.2 K/UL (ref 1.7–8.2)
NEUTS SEG # BLD: 0.3 K/UL (ref 1.7–8.2)
NEUTS SEG # BLD: 0.4 K/UL (ref 1.7–8.2)
NEUTS SEG # BLD: 0.5 K/UL (ref 1.7–8.2)
NEUTS SEG # BLD: 0.6 K/UL (ref 1.7–8.2)
NEUTS SEG # BLD: 0.7 K/UL (ref 1.7–8.2)
NEUTS SEG # BLD: 0.8 K/UL (ref 1.7–8.2)
NEUTS SEG # BLD: 0.9 K/UL (ref 1.7–8.2)
NEUTS SEG # BLD: 1 K/UL (ref 1.7–8.2)
NEUTS SEG # BLD: 1.1 K/UL (ref 1.7–8.2)
NEUTS SEG # BLD: 1.2 K/UL (ref 1.7–8.2)
NEUTS SEG # BLD: 1.3 K/UL (ref 1.7–8.2)
NEUTS SEG # BLD: 1.4 K/UL (ref 1.7–8.2)
NEUTS SEG # BLD: 1.4 K/UL (ref 1.7–8.2)
NEUTS SEG # BLD: 1.5 K/UL (ref 1.7–8.2)
NEUTS SEG # BLD: 1.6 K/UL (ref 1.7–8.2)
NEUTS SEG # BLD: 1.6 K/UL (ref 1.7–8.2)
NEUTS SEG # BLD: 1.7 K/UL (ref 1.7–8.2)
NEUTS SEG # BLD: 1.7 K/UL (ref 1.7–8.2)
NEUTS SEG # BLD: 1.8 K/UL (ref 1.7–8.2)
NEUTS SEG # BLD: 1.9 K/UL (ref 1.7–8.2)
NEUTS SEG # BLD: 2 K/UL (ref 1.7–8.2)
NEUTS SEG # BLD: 2 K/UL (ref 1.7–8.2)
NEUTS SEG # BLD: 2.1 K/UL (ref 1.7–8.2)
NEUTS SEG # BLD: 2.3 K/UL (ref 1.7–8.2)
NEUTS SEG # BLD: 2.5 K/UL (ref 1.7–8.2)
NEUTS SEG # BLD: 2.7 K/UL (ref 1.7–8.2)
NEUTS SEG # BLD: 2.8 K/UL (ref 1.7–8.2)
NEUTS SEG # BLD: 2.9 K/UL (ref 1.7–8.2)
NEUTS SEG # BLD: 3.3 K/UL (ref 1.7–8.2)
NEUTS SEG # BLD: 3.4 K/UL (ref 1.7–8.2)
NEUTS SEG # BLD: 3.5 K/UL (ref 1.7–8.2)
NEUTS SEG # BLD: 3.6 K/UL (ref 1.7–8.2)
NEUTS SEG # BLD: 3.6 K/UL (ref 1.7–8.2)
NEUTS SEG # BLD: 4.8 K/UL (ref 1.7–8.2)
NEUTS SEG # BLD: 5.5 K/UL (ref 1.7–8.2)
NEUTS SEG # BLD: 7.7 K/UL (ref 1.7–8.2)
NEUTS SEG NFR BLD MANUAL: 16 % (ref 47–75)
NEUTS SEG NFR BLD MANUAL: 2 % (ref 47–75)
NEUTS SEG NFR BLD MANUAL: 2 % (ref 47–75)
NEUTS SEG NFR BLD MANUAL: 3 % (ref 47–75)
NEUTS SEG NFR BLD MANUAL: 3 % (ref 47–75)
NEUTS SEG NFR BLD MANUAL: 32 % (ref 47–75)
NEUTS SEG NFR BLD MANUAL: 46 % (ref 47–75)
NEUTS SEG NFR BLD MANUAL: 6 % (ref 47–75)
NEUTS SEG NFR BLD MANUAL: 7 % (ref 47–75)
NEUTS SEG NFR BLD: 18 % (ref 43–78)
NEUTS SEG NFR BLD: 22 % (ref 43–78)
NEUTS SEG NFR BLD: 22 % (ref 43–78)
NEUTS SEG NFR BLD: 23 % (ref 43–78)
NEUTS SEG NFR BLD: 24 % (ref 43–78)
NEUTS SEG NFR BLD: 25 % (ref 43–78)
NEUTS SEG NFR BLD: 26 % (ref 43–78)
NEUTS SEG NFR BLD: 27 % (ref 43–78)
NEUTS SEG NFR BLD: 28 % (ref 43–78)
NEUTS SEG NFR BLD: 29 % (ref 43–78)
NEUTS SEG NFR BLD: 30 % (ref 43–78)
NEUTS SEG NFR BLD: 31 % (ref 43–78)
NEUTS SEG NFR BLD: 31 % (ref 43–78)
NEUTS SEG NFR BLD: 32 % (ref 43–78)
NEUTS SEG NFR BLD: 32 % (ref 43–78)
NEUTS SEG NFR BLD: 33 % (ref 43–78)
NEUTS SEG NFR BLD: 34 % (ref 43–78)
NEUTS SEG NFR BLD: 35 % (ref 43–78)
NEUTS SEG NFR BLD: 35 % (ref 43–78)
NEUTS SEG NFR BLD: 36 % (ref 43–78)
NEUTS SEG NFR BLD: 36 % (ref 43–78)
NEUTS SEG NFR BLD: 37 % (ref 43–78)
NEUTS SEG NFR BLD: 38 % (ref 43–78)
NEUTS SEG NFR BLD: 39 % (ref 43–78)
NEUTS SEG NFR BLD: 39 % (ref 43–78)
NEUTS SEG NFR BLD: 40 % (ref 43–78)
NEUTS SEG NFR BLD: 41 % (ref 43–78)
NEUTS SEG NFR BLD: 41 % (ref 43–78)
NEUTS SEG NFR BLD: 42 % (ref 43–78)
NEUTS SEG NFR BLD: 43 % (ref 43–78)
NEUTS SEG NFR BLD: 44 % (ref 43–78)
NEUTS SEG NFR BLD: 44 % (ref 43–78)
NEUTS SEG NFR BLD: 45 % (ref 43–78)
NEUTS SEG NFR BLD: 46 % (ref 43–78)
NEUTS SEG NFR BLD: 47 % (ref 43–78)
NEUTS SEG NFR BLD: 47 % (ref 43–78)
NEUTS SEG NFR BLD: 49 % (ref 43–78)
NEUTS SEG NFR BLD: 50 % (ref 43–78)
NEUTS SEG NFR BLD: 51 % (ref 43–78)
NEUTS SEG NFR BLD: 52 % (ref 43–78)
NEUTS SEG NFR BLD: 52 % (ref 43–78)
NEUTS SEG NFR BLD: 54 % (ref 43–78)
NEUTS SEG NFR BLD: 54 % (ref 43–78)
NEUTS SEG NFR BLD: 57 % (ref 43–78)
NEUTS SEG NFR BLD: 59 % (ref 43–78)
NEUTS SEG NFR BLD: 59 % (ref 43–78)
NEUTS SEG NFR BLD: 61 % (ref 43–78)
NEUTS SEG NFR BLD: 63 % (ref 43–78)
NEUTS SEG NFR BLD: 64 % (ref 43–78)
NEUTS SEG NFR BLD: 64 % (ref 43–78)
NEUTS SEG NFR BLD: 68 % (ref 43–78)
NEUTS SEG NFR BLD: 68 % (ref 43–78)
NEUTS SEG NFR BLD: 69 % (ref 43–78)
NEUTS SEG NFR BLD: 70 % (ref 43–78)
NEUTS SEG NFR BLD: 71 % (ref 43–78)
NEUTS SEG NFR BLD: 72 % (ref 43–78)
NEUTS SEG NFR BLD: 74 % (ref 43–78)
NEUTS SEG NFR BLD: 74 % (ref 43–78)
NEUTS SEG NFR BLD: 75 % (ref 43–78)
NEUTS SEG NFR BLD: 77 % (ref 43–78)
NEUTS SEG NFR BLD: 78 % (ref 43–78)
NEUTS SEG NFR BLD: 80 % (ref 43–78)
NEUTS SEG NFR BLD: 80 % (ref 43–78)
NEUTS SEG NFR BLD: 81 % (ref 43–78)
NEUTS SEG NFR BLD: 82 % (ref 43–78)
NEUTS SEG NFR BLD: 88 % (ref 43–78)
NEUTS SEG NFR BLD: 9 % (ref 43–78)
NEUTS SEG NFR BLD: 90 % (ref 43–78)
NITRITE UR QL STRIP.AUTO: NEGATIVE
NITRITE UR QL STRIP.AUTO: NEGATIVE
NRBC # BLD: 0 K/UL (ref 0–0.2)
NRBC # BLD: 0.02 K/UL (ref 0–0.2)
NRBC # BLD: 0.03 K/UL (ref 0–0.2)
NRBC # BLD: 0.04 K/UL (ref 0–0.2)
NRBC # BLD: 0.04 K/UL (ref 0–0.2)
NRBC # BLD: 0.05 K/UL (ref 0–0.2)
NRBC # BLD: 0.08 K/UL (ref 0–0.2)
PH UR STRIP: 6.5 [PH] (ref 5–9)
PH UR STRIP: 7 [PH] (ref 5–9)
PHOSPHATE SERPL-MCNC: 3.1 MG/DL (ref 2.3–3.7)
PHOSPHATE SERPL-MCNC: 3.3 MG/DL (ref 2.3–3.7)
PHOSPHATE SERPL-MCNC: 3.5 MG/DL (ref 2.3–3.7)
PLATELET # BLD AUTO: 10 K/UL (ref 150–450)
PLATELET # BLD AUTO: 100 K/UL (ref 150–450)
PLATELET # BLD AUTO: 105 K/UL (ref 150–450)
PLATELET # BLD AUTO: 105 K/UL (ref 150–450)
PLATELET # BLD AUTO: 112 K/UL (ref 150–450)
PLATELET # BLD AUTO: 112 K/UL (ref 150–450)
PLATELET # BLD AUTO: 114 K/UL (ref 150–450)
PLATELET # BLD AUTO: 116 K/UL (ref 150–450)
PLATELET # BLD AUTO: 117 K/UL (ref 150–450)
PLATELET # BLD AUTO: 12 K/UL (ref 150–450)
PLATELET # BLD AUTO: 13 K/UL (ref 150–450)
PLATELET # BLD AUTO: 14 K/UL (ref 150–450)
PLATELET # BLD AUTO: 15 K/UL (ref 150–450)
PLATELET # BLD AUTO: 16 K/UL (ref 150–450)
PLATELET # BLD AUTO: 16 K/UL (ref 150–450)
PLATELET # BLD AUTO: 18 K/UL (ref 150–450)
PLATELET # BLD AUTO: 18 K/UL (ref 150–450)
PLATELET # BLD AUTO: 19 K/UL (ref 150–450)
PLATELET # BLD AUTO: 19 K/UL (ref 150–450)
PLATELET # BLD AUTO: 2 K/UL (ref 150–450)
PLATELET # BLD AUTO: 20 K/UL (ref 150–450)
PLATELET # BLD AUTO: 20 K/UL (ref 150–450)
PLATELET # BLD AUTO: 22 K/UL (ref 150–450)
PLATELET # BLD AUTO: 23 K/UL (ref 150–450)
PLATELET # BLD AUTO: 234 K/UL (ref 150–450)
PLATELET # BLD AUTO: 25 K/UL (ref 150–450)
PLATELET # BLD AUTO: 26 K/UL (ref 150–450)
PLATELET # BLD AUTO: 27 K/UL (ref 150–450)
PLATELET # BLD AUTO: 27 K/UL (ref 150–450)
PLATELET # BLD AUTO: 28 K/UL (ref 150–450)
PLATELET # BLD AUTO: 29 K/UL (ref 150–450)
PLATELET # BLD AUTO: 29 K/UL (ref 150–450)
PLATELET # BLD AUTO: 3 K/UL (ref 150–450)
PLATELET # BLD AUTO: 3 K/UL (ref 150–450)
PLATELET # BLD AUTO: 30 K/UL (ref 150–450)
PLATELET # BLD AUTO: 31 K/UL (ref 150–450)
PLATELET # BLD AUTO: 32 K/UL (ref 150–450)
PLATELET # BLD AUTO: 32 K/UL (ref 150–450)
PLATELET # BLD AUTO: 33 K/UL (ref 150–450)
PLATELET # BLD AUTO: 35 K/UL (ref 150–450)
PLATELET # BLD AUTO: 37 K/UL (ref 150–450)
PLATELET # BLD AUTO: 38 K/UL (ref 150–450)
PLATELET # BLD AUTO: 38 K/UL (ref 150–450)
PLATELET # BLD AUTO: 39 K/UL (ref 150–450)
PLATELET # BLD AUTO: 39 K/UL (ref 150–450)
PLATELET # BLD AUTO: 4 K/UL (ref 150–450)
PLATELET # BLD AUTO: 40 K/UL (ref 150–450)
PLATELET # BLD AUTO: 42 K/UL (ref 150–450)
PLATELET # BLD AUTO: 43 K/UL (ref 150–450)
PLATELET # BLD AUTO: 44 K/UL (ref 150–450)
PLATELET # BLD AUTO: 46 K/UL (ref 150–450)
PLATELET # BLD AUTO: 46 K/UL (ref 150–450)
PLATELET # BLD AUTO: 47 K/UL (ref 150–450)
PLATELET # BLD AUTO: 48 K/UL (ref 150–450)
PLATELET # BLD AUTO: 5 K/UL (ref 150–450)
PLATELET # BLD AUTO: 5 K/UL (ref 150–450)
PLATELET # BLD AUTO: 50 K/UL (ref 150–450)
PLATELET # BLD AUTO: 50 K/UL (ref 150–450)
PLATELET # BLD AUTO: 51 K/UL (ref 150–450)
PLATELET # BLD AUTO: 51 K/UL (ref 150–450)
PLATELET # BLD AUTO: 52 K/UL (ref 150–450)
PLATELET # BLD AUTO: 53 K/UL (ref 150–450)
PLATELET # BLD AUTO: 54 K/UL (ref 150–450)
PLATELET # BLD AUTO: 55 K/UL (ref 150–450)
PLATELET # BLD AUTO: 56 K/UL (ref 150–450)
PLATELET # BLD AUTO: 57 K/UL (ref 150–450)
PLATELET # BLD AUTO: 58 K/UL (ref 150–450)
PLATELET # BLD AUTO: 59 K/UL (ref 150–450)
PLATELET # BLD AUTO: 60 K/UL (ref 150–450)
PLATELET # BLD AUTO: 63 K/UL (ref 150–450)
PLATELET # BLD AUTO: 64 K/UL (ref 150–450)
PLATELET # BLD AUTO: 65 K/UL (ref 150–450)
PLATELET # BLD AUTO: 66 K/UL (ref 150–450)
PLATELET # BLD AUTO: 67 K/UL (ref 150–450)
PLATELET # BLD AUTO: 69 K/UL (ref 150–450)
PLATELET # BLD AUTO: 69 K/UL (ref 150–450)
PLATELET # BLD AUTO: 7 K/UL (ref 150–450)
PLATELET # BLD AUTO: 71 K/UL (ref 150–450)
PLATELET # BLD AUTO: 72 K/UL (ref 150–450)
PLATELET # BLD AUTO: 73 K/UL (ref 150–450)
PLATELET # BLD AUTO: 73 K/UL (ref 150–450)
PLATELET # BLD AUTO: 76 K/UL (ref 150–450)
PLATELET # BLD AUTO: 78 K/UL (ref 150–450)
PLATELET # BLD AUTO: 8 K/UL (ref 150–450)
PLATELET # BLD AUTO: 80 K/UL (ref 150–450)
PLATELET # BLD AUTO: 85 K/UL (ref 150–450)
PLATELET # BLD AUTO: 85 K/UL (ref 150–450)
PLATELET # BLD AUTO: 87 K/UL (ref 150–450)
PLATELET # BLD AUTO: 88 K/UL (ref 150–450)
PLATELET # BLD AUTO: 9 K/UL (ref 150–450)
PLATELET # BLD AUTO: 9 K/UL (ref 150–450)
PLATELET # BLD AUTO: 90 K/UL (ref 150–450)
PLATELET # BLD AUTO: 90 K/UL (ref 150–450)
PLATELET # BLD AUTO: 91 K/UL (ref 150–450)
PLATELET # BLD AUTO: 91 K/UL (ref 150–450)
PLATELET COMMENTS,PCOM: ABNORMAL
PMV BLD AUTO: 10 FL (ref 9.4–12.3)
PMV BLD AUTO: 10 FL (ref 9.4–12.3)
PMV BLD AUTO: 10.1 FL (ref 9.4–12.3)
PMV BLD AUTO: 10.2 FL (ref 9.4–12.3)
PMV BLD AUTO: 10.3 FL (ref 9.4–12.3)
PMV BLD AUTO: 10.4 FL (ref 9.4–12.3)
PMV BLD AUTO: 10.5 FL (ref 9.4–12.3)
PMV BLD AUTO: 10.6 FL (ref 9.4–12.3)
PMV BLD AUTO: 10.7 FL (ref 9.4–12.3)
PMV BLD AUTO: 10.8 FL (ref 9.4–12.3)
PMV BLD AUTO: 10.9 FL (ref 9.4–12.3)
PMV BLD AUTO: 11 FL (ref 9.4–12.3)
PMV BLD AUTO: 11.1 FL (ref 9.4–12.3)
PMV BLD AUTO: 11.1 FL (ref 9.4–12.3)
PMV BLD AUTO: 11.2 FL (ref 9.4–12.3)
PMV BLD AUTO: 11.3 FL (ref 9.4–12.3)
PMV BLD AUTO: 11.3 FL (ref 9.4–12.3)
PMV BLD AUTO: 11.4 FL (ref 9.4–12.3)
PMV BLD AUTO: 11.5 FL (ref 9.4–12.3)
PMV BLD AUTO: 11.6 FL (ref 9.4–12.3)
PMV BLD AUTO: 11.7 FL (ref 9.4–12.3)
PMV BLD AUTO: 11.8 FL (ref 9.4–12.3)
PMV BLD AUTO: 11.9 FL (ref 9.4–12.3)
PMV BLD AUTO: 11.9 FL (ref 9.4–12.3)
PMV BLD AUTO: 12.1 FL (ref 9.4–12.3)
PMV BLD AUTO: 12.2 FL (ref 9.4–12.3)
PMV BLD AUTO: 12.5 FL (ref 9.4–12.3)
PMV BLD AUTO: 12.6 FL (ref 9.4–12.3)
PMV BLD AUTO: 12.7 FL (ref 9.4–12.3)
PMV BLD AUTO: 13.1 FL (ref 9.4–12.3)
PMV BLD AUTO: 13.3 FL (ref 9.4–12.3)
PMV BLD AUTO: 8.9 FL (ref 9.4–12.3)
PMV BLD AUTO: 9 FL (ref 9.4–12.3)
PMV BLD AUTO: 9 FL (ref 9.4–12.3)
PMV BLD AUTO: 9.2 FL (ref 9.4–12.3)
PMV BLD AUTO: 9.3 FL (ref 9.4–12.3)
PMV BLD AUTO: 9.5 FL (ref 9.4–12.3)
PMV BLD AUTO: 9.5 FL (ref 9.4–12.3)
PMV BLD AUTO: 9.6 FL (ref 9.4–12.3)
PMV BLD AUTO: 9.7 FL (ref 9.4–12.3)
PMV BLD AUTO: 9.8 FL (ref 9.4–12.3)
PMV BLD AUTO: 9.9 FL (ref 9.4–12.3)
PMV BLD AUTO: ABNORMAL FL (ref 9.4–12.3)
POTASSIUM SERPL-SCNC: 3.2 MMOL/L (ref 3.5–5.1)
POTASSIUM SERPL-SCNC: 3.4 MMOL/L (ref 3.5–5.1)
POTASSIUM SERPL-SCNC: 3.6 MMOL/L (ref 3.5–5.1)
POTASSIUM SERPL-SCNC: 3.7 MMOL/L (ref 3.5–5.1)
POTASSIUM SERPL-SCNC: 3.8 MMOL/L (ref 3.5–5.1)
POTASSIUM SERPL-SCNC: 3.9 MMOL/L (ref 3.5–5.1)
POTASSIUM SERPL-SCNC: 4 MMOL/L (ref 3.5–5.1)
POTASSIUM SERPL-SCNC: 4.1 MMOL/L (ref 3.5–5.1)
POTASSIUM SERPL-SCNC: 4.2 MMOL/L (ref 3.5–5.1)
POTASSIUM SERPL-SCNC: 4.3 MMOL/L (ref 3.5–5.1)
POTASSIUM SERPL-SCNC: 4.4 MMOL/L (ref 3.5–5.1)
POTASSIUM SERPL-SCNC: 4.5 MMOL/L (ref 3.5–5.1)
POTASSIUM SERPL-SCNC: 4.5 MMOL/L (ref 3.5–5.1)
POTASSIUM SERPL-SCNC: 4.6 MMOL/L (ref 3.5–5.1)
POTASSIUM SERPL-SCNC: 4.8 MMOL/L (ref 3.5–5.1)
POTASSIUM SERPL-SCNC: 4.8 MMOL/L (ref 3.5–5.1)
POTASSIUM SERPL-SCNC: 4.9 MMOL/L (ref 3.5–5.1)
POTASSIUM SERPL-SCNC: 4.9 MMOL/L (ref 3.5–5.1)
POTASSIUM SERPL-SCNC: 5 MMOL/L (ref 3.5–5.1)
POTASSIUM SERPL-SCNC: 5.1 MMOL/L (ref 3.5–5.1)
PROMYELOCYTES NFR BLD MANUAL: 1 %
PROMYELOCYTES NFR BLD MANUAL: 3 %
PROT SERPL-MCNC: 5.6 G/DL (ref 6.3–8.2)
PROT SERPL-MCNC: 5.8 G/DL (ref 6.3–8.2)
PROT SERPL-MCNC: 5.8 G/DL (ref 6.3–8.2)
PROT SERPL-MCNC: 5.9 G/DL (ref 6.3–8.2)
PROT SERPL-MCNC: 6 G/DL (ref 6.3–8.2)
PROT SERPL-MCNC: 6.1 G/DL (ref 6.3–8.2)
PROT SERPL-MCNC: 6.2 G/DL (ref 6.3–8.2)
PROT SERPL-MCNC: 6.3 G/DL (ref 6.3–8.2)
PROT SERPL-MCNC: 6.4 G/DL (ref 6.3–8.2)
PROT SERPL-MCNC: 6.5 G/DL (ref 6.3–8.2)
PROT SERPL-MCNC: 6.6 G/DL (ref 6.3–8.2)
PROT SERPL-MCNC: 6.7 G/DL (ref 6.3–8.2)
PROT SERPL-MCNC: 6.8 G/DL (ref 6.3–8.2)
PROT SERPL-MCNC: 6.9 G/DL (ref 6.3–8.2)
PROT SERPL-MCNC: 7 G/DL (ref 6.3–8.2)
PROT SERPL-MCNC: 7.1 G/DL (ref 6.3–8.2)
PROT UR STRIP-MCNC: 30 MG/DL
PROT UR STRIP-MCNC: NEGATIVE MG/DL
RBC # BLD AUTO: 1.87 M/UL (ref 4.05–5.25)
RBC # BLD AUTO: 1.88 M/UL (ref 4.05–5.25)
RBC # BLD AUTO: 1.91 M/UL (ref 4.05–5.2)
RBC # BLD AUTO: 1.91 M/UL (ref 4.05–5.25)
RBC # BLD AUTO: 1.98 M/UL (ref 4.05–5.25)
RBC # BLD AUTO: 1.99 M/UL (ref 4.05–5.25)
RBC # BLD AUTO: 1.99 M/UL (ref 4.05–5.25)
RBC # BLD AUTO: 2.04 M/UL (ref 4.05–5.25)
RBC # BLD AUTO: 2.07 M/UL (ref 4.05–5.25)
RBC # BLD AUTO: 2.07 M/UL (ref 4.05–5.25)
RBC # BLD AUTO: 2.09 M/UL (ref 4.05–5.25)
RBC # BLD AUTO: 2.09 M/UL (ref 4.05–5.25)
RBC # BLD AUTO: 2.1 M/UL (ref 4.05–5.25)
RBC # BLD AUTO: 2.12 M/UL (ref 4.05–5.25)
RBC # BLD AUTO: 2.16 M/UL (ref 4.05–5.25)
RBC # BLD AUTO: 2.18 M/UL (ref 4.05–5.2)
RBC # BLD AUTO: 2.2 M/UL (ref 4.05–5.25)
RBC # BLD AUTO: 2.21 M/UL (ref 4.05–5.25)
RBC # BLD AUTO: 2.22 M/UL (ref 4.05–5.25)
RBC # BLD AUTO: 2.23 M/UL (ref 4.05–5.2)
RBC # BLD AUTO: 2.25 M/UL (ref 4.05–5.25)
RBC # BLD AUTO: 2.25 M/UL (ref 4.05–5.25)
RBC # BLD AUTO: 2.26 M/UL (ref 4.05–5.25)
RBC # BLD AUTO: 2.29 M/UL (ref 4.05–5.25)
RBC # BLD AUTO: 2.3 M/UL (ref 4.05–5.25)
RBC # BLD AUTO: 2.31 M/UL (ref 4.05–5.2)
RBC # BLD AUTO: 2.31 M/UL (ref 4.05–5.25)
RBC # BLD AUTO: 2.35 M/UL (ref 4.05–5.25)
RBC # BLD AUTO: 2.37 M/UL (ref 4.05–5.25)
RBC # BLD AUTO: 2.38 M/UL (ref 4.05–5.2)
RBC # BLD AUTO: 2.38 M/UL (ref 4.05–5.25)
RBC # BLD AUTO: 2.39 M/UL (ref 4.05–5.2)
RBC # BLD AUTO: 2.41 M/UL (ref 4.05–5.25)
RBC # BLD AUTO: 2.42 M/UL (ref 4.05–5.25)
RBC # BLD AUTO: 2.43 M/UL (ref 4.05–5.25)
RBC # BLD AUTO: 2.44 M/UL (ref 4.05–5.2)
RBC # BLD AUTO: 2.44 M/UL (ref 4.05–5.25)
RBC # BLD AUTO: 2.46 M/UL (ref 4.05–5.2)
RBC # BLD AUTO: 2.46 M/UL (ref 4.05–5.25)
RBC # BLD AUTO: 2.47 M/UL (ref 4.05–5.25)
RBC # BLD AUTO: 2.47 M/UL (ref 4.05–5.25)
RBC # BLD AUTO: 2.48 M/UL (ref 4.05–5.2)
RBC # BLD AUTO: 2.49 M/UL (ref 4.05–5.2)
RBC # BLD AUTO: 2.49 M/UL (ref 4.05–5.25)
RBC # BLD AUTO: 2.5 M/UL (ref 4.05–5.2)
RBC # BLD AUTO: 2.5 M/UL (ref 4.05–5.2)
RBC # BLD AUTO: 2.51 M/UL (ref 4.05–5.2)
RBC # BLD AUTO: 2.53 M/UL (ref 4.05–5.25)
RBC # BLD AUTO: 2.53 M/UL (ref 4.05–5.25)
RBC # BLD AUTO: 2.54 M/UL (ref 4.05–5.2)
RBC # BLD AUTO: 2.54 M/UL (ref 4.05–5.25)
RBC # BLD AUTO: 2.56 M/UL (ref 4.05–5.2)
RBC # BLD AUTO: 2.56 M/UL (ref 4.05–5.25)
RBC # BLD AUTO: 2.57 M/UL (ref 4.05–5.2)
RBC # BLD AUTO: 2.57 M/UL (ref 4.05–5.25)
RBC # BLD AUTO: 2.6 M/UL (ref 4.05–5.2)
RBC # BLD AUTO: 2.6 M/UL (ref 4.05–5.25)
RBC # BLD AUTO: 2.6 M/UL (ref 4.05–5.25)
RBC # BLD AUTO: 2.61 M/UL (ref 4.05–5.25)
RBC # BLD AUTO: 2.61 M/UL (ref 4.05–5.25)
RBC # BLD AUTO: 2.65 M/UL (ref 4.05–5.25)
RBC # BLD AUTO: 2.66 M/UL (ref 4.05–5.25)
RBC # BLD AUTO: 2.67 M/UL (ref 4.05–5.2)
RBC # BLD AUTO: 2.67 M/UL (ref 4.05–5.25)
RBC # BLD AUTO: 2.68 M/UL (ref 4.05–5.25)
RBC # BLD AUTO: 2.7 M/UL (ref 4.05–5.25)
RBC # BLD AUTO: 2.71 M/UL (ref 4.05–5.2)
RBC # BLD AUTO: 2.72 M/UL (ref 4.05–5.2)
RBC # BLD AUTO: 2.72 M/UL (ref 4.05–5.25)
RBC # BLD AUTO: 2.73 M/UL (ref 4.05–5.2)
RBC # BLD AUTO: 2.73 M/UL (ref 4.05–5.25)
RBC # BLD AUTO: 2.73 M/UL (ref 4.05–5.25)
RBC # BLD AUTO: 2.75 M/UL (ref 4.05–5.2)
RBC # BLD AUTO: 2.75 M/UL (ref 4.05–5.25)
RBC # BLD AUTO: 2.76 M/UL (ref 4.05–5.25)
RBC # BLD AUTO: 2.77 M/UL (ref 4.05–5.2)
RBC # BLD AUTO: 2.77 M/UL (ref 4.05–5.25)
RBC # BLD AUTO: 2.78 M/UL (ref 4.05–5.25)
RBC # BLD AUTO: 2.79 M/UL (ref 4.05–5.25)
RBC # BLD AUTO: 2.8 M/UL (ref 4.05–5.25)
RBC # BLD AUTO: 2.8 M/UL (ref 4.05–5.25)
RBC # BLD AUTO: 2.81 M/UL (ref 4.05–5.2)
RBC # BLD AUTO: 2.81 M/UL (ref 4.05–5.25)
RBC # BLD AUTO: 2.83 M/UL (ref 4.05–5.25)
RBC # BLD AUTO: 2.84 M/UL (ref 4.05–5.2)
RBC # BLD AUTO: 2.84 M/UL (ref 4.05–5.25)
RBC # BLD AUTO: 2.85 M/UL (ref 4.05–5.25)
RBC # BLD AUTO: 2.85 M/UL (ref 4.05–5.25)
RBC # BLD AUTO: 2.86 M/UL (ref 4.05–5.25)
RBC # BLD AUTO: 2.87 M/UL (ref 4.05–5.25)
RBC # BLD AUTO: 2.89 M/UL (ref 4.05–5.25)
RBC # BLD AUTO: 2.9 M/UL (ref 4.05–5.25)
RBC # BLD AUTO: 2.92 M/UL (ref 4.05–5.25)
RBC # BLD AUTO: 2.95 M/UL (ref 4.05–5.25)
RBC # BLD AUTO: 3.02 M/UL (ref 4.05–5.25)
RBC # BLD AUTO: 3.03 M/UL (ref 4.05–5.25)
RBC # BLD AUTO: 3.04 M/UL (ref 4.05–5.25)
RBC # BLD AUTO: 3.09 M/UL (ref 4.05–5.25)
RBC # BLD AUTO: 3.1 M/UL (ref 4.05–5.25)
RBC #/AREA URNS HPF: ABNORMAL /HPF
RBC #/AREA URNS HPF: ABNORMAL /HPF
RBC MORPH BLD: ABNORMAL
SARS COV-2, XPGCVT: NEGATIVE
SERVICE CMNT-IMP: ABNORMAL
SERVICE CMNT-IMP: NORMAL
SODIUM SERPL-SCNC: 136 MMOL/L (ref 136–145)
SODIUM SERPL-SCNC: 137 MMOL/L (ref 136–145)
SODIUM SERPL-SCNC: 137 MMOL/L (ref 136–145)
SODIUM SERPL-SCNC: 138 MMOL/L (ref 136–145)
SODIUM SERPL-SCNC: 139 MMOL/L (ref 136–145)
SODIUM SERPL-SCNC: 140 MMOL/L (ref 136–145)
SODIUM SERPL-SCNC: 141 MMOL/L (ref 136–145)
SODIUM SERPL-SCNC: 142 MMOL/L (ref 136–145)
SODIUM SERPL-SCNC: 143 MMOL/L (ref 136–145)
SODIUM SERPL-SCNC: 144 MMOL/L (ref 136–145)
SOURCE, COVRS: NORMAL
SP GR UR REFRACTOMETRY: 1.01 (ref 1–1.02)
SP GR UR REFRACTOMETRY: 1.02 (ref 1–1.02)
SPECIMEN EXP DATE BLD: NORMAL
SPECIMEN SOURCE: NORMAL
STATUS OF UNIT,%ST: NORMAL
TEST ORDERED:: NORMAL
TOTAL CELLS COUNTED SPEC: 50
UA: UC IF INDICATED,UAUC: ABNORMAL
UNIT DIVISION, %UDIV: 0
URATE SERPL-MCNC: 3.2 MG/DL (ref 2.6–6)
URATE SERPL-MCNC: 3.4 MG/DL (ref 2.6–6)
URATE SERPL-MCNC: 3.5 MG/DL (ref 2.6–6)
URATE SERPL-MCNC: 3.5 MG/DL (ref 2.6–6)
URATE SERPL-MCNC: 3.7 MG/DL (ref 2.6–6)
URATE SERPL-MCNC: 3.7 MG/DL (ref 2.6–6)
UROBILINOGEN UR QL STRIP.AUTO: 0.2 EU/DL (ref 0.2–1)
UROBILINOGEN UR QL STRIP.AUTO: 0.2 EU/DL (ref 0.2–1)
VANCOMYCIN TROUGH SERPL-MCNC: 13.2 UG/ML (ref 5–20)
VANCOMYCIN TROUGH SERPL-MCNC: 18.9 UG/ML (ref 5–20)
WBC # BLD AUTO: 0 K/UL (ref 4.3–11.1)
WBC # BLD AUTO: 0.1 K/UL (ref 4.3–11.1)
WBC # BLD AUTO: 0.2 K/UL (ref 4.3–11.1)
WBC # BLD AUTO: 0.3 K/UL (ref 4.3–11.1)
WBC # BLD AUTO: 0.3 K/UL (ref 4.3–11.1)
WBC # BLD AUTO: 0.4 K/UL (ref 4.3–11.1)
WBC # BLD AUTO: 0.6 K/UL (ref 4.3–11.1)
WBC # BLD AUTO: 0.7 K/UL (ref 4.3–11.1)
WBC # BLD AUTO: 0.7 K/UL (ref 4.3–11.1)
WBC # BLD AUTO: 0.8 K/UL (ref 4.3–11.1)
WBC # BLD AUTO: 0.9 K/UL (ref 4.3–11.1)
WBC # BLD AUTO: 1 K/UL (ref 4.3–11.1)
WBC # BLD AUTO: 1.1 K/UL (ref 4.3–11.1)
WBC # BLD AUTO: 1.2 K/UL (ref 4.3–11.1)
WBC # BLD AUTO: 1.3 K/UL (ref 4.3–11.1)
WBC # BLD AUTO: 1.4 K/UL (ref 4.3–11.1)
WBC # BLD AUTO: 1.5 K/UL (ref 4.3–11.1)
WBC # BLD AUTO: 1.6 K/UL (ref 4.3–11.1)
WBC # BLD AUTO: 1.7 K/UL (ref 4.3–11.1)
WBC # BLD AUTO: 1.9 K/UL (ref 4.3–11.1)
WBC # BLD AUTO: 10.4 K/UL (ref 4.3–11.1)
WBC # BLD AUTO: 10.8 K/UL (ref 4.3–11.1)
WBC # BLD AUTO: 18.4 K/UL (ref 4.3–11.1)
WBC # BLD AUTO: 2 K/UL (ref 4.3–11.1)
WBC # BLD AUTO: 2.1 K/UL (ref 4.3–11.1)
WBC # BLD AUTO: 2.2 K/UL (ref 4.3–11.1)
WBC # BLD AUTO: 2.2 K/UL (ref 4.3–11.1)
WBC # BLD AUTO: 2.5 K/UL (ref 4.3–11.1)
WBC # BLD AUTO: 2.5 K/UL (ref 4.3–11.1)
WBC # BLD AUTO: 2.6 K/UL (ref 4.3–11.1)
WBC # BLD AUTO: 2.7 K/UL (ref 4.3–11.1)
WBC # BLD AUTO: 2.7 K/UL (ref 4.3–11.1)
WBC # BLD AUTO: 2.9 K/UL (ref 4.3–11.1)
WBC # BLD AUTO: 27.3 K/UL (ref 4.3–11.1)
WBC # BLD AUTO: 3 K/UL (ref 4.3–11.1)
WBC # BLD AUTO: 3.1 K/UL (ref 4.3–11.1)
WBC # BLD AUTO: 3.3 K/UL (ref 4.3–11.1)
WBC # BLD AUTO: 3.4 K/UL (ref 4.3–11.1)
WBC # BLD AUTO: 3.5 K/UL (ref 4.3–11.1)
WBC # BLD AUTO: 3.6 K/UL (ref 4.3–11.1)
WBC # BLD AUTO: 3.8 K/UL (ref 4.3–11.1)
WBC # BLD AUTO: 3.9 K/UL (ref 4.3–11.1)
WBC # BLD AUTO: 4 K/UL (ref 4.3–11.1)
WBC # BLD AUTO: 4.1 K/UL (ref 4.3–11.1)
WBC # BLD AUTO: 4.1 K/UL (ref 4.3–11.1)
WBC # BLD AUTO: 4.2 K/UL (ref 4.3–11.1)
WBC # BLD AUTO: 4.3 K/UL (ref 4.3–11.1)
WBC # BLD AUTO: 4.4 K/UL (ref 4.3–11.1)
WBC # BLD AUTO: 4.6 K/UL (ref 4.3–11.1)
WBC # BLD AUTO: 4.7 K/UL (ref 4.3–11.1)
WBC # BLD AUTO: 5 K/UL (ref 4.3–11.1)
WBC # BLD AUTO: 5.1 K/UL (ref 4.3–11.1)
WBC # BLD AUTO: 5.5 K/UL (ref 4.3–11.1)
WBC # BLD AUTO: 5.6 K/UL (ref 4.3–11.1)
WBC # BLD AUTO: 5.7 K/UL (ref 4.3–11.1)
WBC # BLD AUTO: 6.2 K/UL (ref 4.3–11.1)
WBC # BLD AUTO: 6.3 K/UL (ref 4.3–11.1)
WBC # BLD AUTO: 7.1 K/UL (ref 4.3–11.1)
WBC # BLD AUTO: 7.9 K/UL (ref 4.3–11.1)
WBC # BLD AUTO: 7.9 K/UL (ref 4.3–11.1)
WBC # BLD AUTO: 8.2 K/UL (ref 4.3–11.1)
WBC # BLD AUTO: 8.6 K/UL (ref 4.3–11.1)
WBC MORPH BLD: ABNORMAL
WBC URNS QL MICRO: ABNORMAL /HPF
WBC URNS QL MICRO: ABNORMAL /HPF

## 2020-01-01 PROCEDURE — 74011250637 HC RX REV CODE- 250/637: Performed by: INTERNAL MEDICINE

## 2020-01-01 PROCEDURE — 65270000029 HC RM PRIVATE

## 2020-01-01 PROCEDURE — 96409 CHEMO IV PUSH SNGL DRUG: CPT

## 2020-01-01 PROCEDURE — 85025 COMPLETE CBC W/AUTO DIFF WBC: CPT

## 2020-01-01 PROCEDURE — 3E043GC INTRODUCTION OF OTHER THERAPEUTIC SUBSTANCE INTO CENTRAL VEIN, PERCUTANEOUS APPROACH: ICD-10-PCS | Performed by: INTERNAL MEDICINE

## 2020-01-01 PROCEDURE — 83735 ASSAY OF MAGNESIUM: CPT

## 2020-01-01 PROCEDURE — 80053 COMPREHEN METABOLIC PANEL: CPT

## 2020-01-01 PROCEDURE — 74011250637 HC RX REV CODE- 250/637: Performed by: NURSE PRACTITIONER

## 2020-01-01 PROCEDURE — 36591 DRAW BLOOD OFF VENOUS DEVICE: CPT

## 2020-01-01 PROCEDURE — 96375 TX/PRO/DX INJ NEW DRUG ADDON: CPT

## 2020-01-01 PROCEDURE — 74011250636 HC RX REV CODE- 250/636: Performed by: INTERNAL MEDICINE

## 2020-01-01 PROCEDURE — 86923 COMPATIBILITY TEST ELECTRIC: CPT

## 2020-01-01 PROCEDURE — 30233N1 TRANSFUSION OF NONAUTOLOGOUS RED BLOOD CELLS INTO PERIPHERAL VEIN, PERCUTANEOUS APPROACH: ICD-10-PCS | Performed by: INTERNAL MEDICINE

## 2020-01-01 PROCEDURE — 96372 THER/PROPH/DIAG INJ SC/IM: CPT

## 2020-01-01 PROCEDURE — 36593 DECLOT VASCULAR DEVICE: CPT

## 2020-01-01 PROCEDURE — 94760 N-INVAS EAR/PLS OXIMETRY 1: CPT

## 2020-01-01 PROCEDURE — 87086 URINE CULTURE/COLONY COUNT: CPT

## 2020-01-01 PROCEDURE — 74011000250 HC RX REV CODE- 250: Performed by: RADIOLOGY

## 2020-01-01 PROCEDURE — 74011250636 HC RX REV CODE- 250/636: Performed by: NURSE PRACTITIONER

## 2020-01-01 PROCEDURE — 36415 COLL VENOUS BLD VENIPUNCTURE: CPT

## 2020-01-01 PROCEDURE — 77030016057 HC NDL HUBR APOL -B

## 2020-01-01 PROCEDURE — 96523 IRRIG DRUG DELIVERY DEVICE: CPT

## 2020-01-01 PROCEDURE — 36430 TRANSFUSION BLD/BLD COMPNT: CPT

## 2020-01-01 PROCEDURE — 84550 ASSAY OF BLOOD/URIC ACID: CPT

## 2020-01-01 PROCEDURE — 86900 BLOOD TYPING SEROLOGIC ABO: CPT

## 2020-01-01 PROCEDURE — 86644 CMV ANTIBODY: CPT

## 2020-01-01 PROCEDURE — 88313 SPECIAL STAINS GROUP 2: CPT

## 2020-01-01 PROCEDURE — P9040 RBC LEUKOREDUCED IRRADIATED: HCPCS

## 2020-01-01 PROCEDURE — 77030003560 HC NDL HUBR BARD -A

## 2020-01-01 PROCEDURE — 74011000258 HC RX REV CODE- 258: Performed by: INTERNAL MEDICINE

## 2020-01-01 PROCEDURE — 83615 LACTATE (LD) (LDH) ENZYME: CPT

## 2020-01-01 PROCEDURE — 88341 IMHCHEM/IMCYTCHM EA ADD ANTB: CPT

## 2020-01-01 PROCEDURE — P9037 PLATE PHERES LEUKOREDU IRRAD: HCPCS

## 2020-01-01 PROCEDURE — 2709999900 HC NON-CHARGEABLE SUPPLY

## 2020-01-01 PROCEDURE — 88185 FLOWCYTOMETRY/TC ADD-ON: CPT

## 2020-01-01 PROCEDURE — 74011000250 HC RX REV CODE- 250: Performed by: INTERNAL MEDICINE

## 2020-01-01 PROCEDURE — 3E04305 INTRODUCTION OF OTHER ANTINEOPLASTIC INTO CENTRAL VEIN, PERCUTANEOUS APPROACH: ICD-10-PCS | Performed by: INTERNAL MEDICINE

## 2020-01-01 PROCEDURE — 74011250636 HC RX REV CODE- 250/636: Performed by: RADIOLOGY

## 2020-01-01 PROCEDURE — 77030018667 ADMN ST IV BLD FENW -A

## 2020-01-01 PROCEDURE — 99232 SBSQ HOSP IP/OBS MODERATE 35: CPT | Performed by: INTERNAL MEDICINE

## 2020-01-01 PROCEDURE — 30233R1 TRANSFUSION OF NONAUTOLOGOUS PLATELETS INTO PERIPHERAL VEIN, PERCUTANEOUS APPROACH: ICD-10-PCS | Performed by: INTERNAL MEDICINE

## 2020-01-01 PROCEDURE — 84100 ASSAY OF PHOSPHORUS: CPT

## 2020-01-01 PROCEDURE — 74011000250 HC RX REV CODE- 250: Performed by: NURSE PRACTITIONER

## 2020-01-01 PROCEDURE — 38222 DX BONE MARROW BX & ASPIR: CPT

## 2020-01-01 PROCEDURE — 86901 BLOOD TYPING SEROLOGIC RH(D): CPT

## 2020-01-01 PROCEDURE — 88305 TISSUE EXAM BY PATHOLOGIST: CPT

## 2020-01-01 PROCEDURE — 81001 URINALYSIS AUTO W/SCOPE: CPT

## 2020-01-01 PROCEDURE — 96366 THER/PROPH/DIAG IV INF ADDON: CPT

## 2020-01-01 PROCEDURE — 88342 IMHCHEM/IMCYTCHM 1ST ANTB: CPT

## 2020-01-01 PROCEDURE — 99223 1ST HOSP IP/OBS HIGH 75: CPT | Performed by: INTERNAL MEDICINE

## 2020-01-01 PROCEDURE — 88237 TISSUE CULTURE BONE MARROW: CPT

## 2020-01-01 PROCEDURE — P9016 RBC LEUKOCYTES REDUCED: HCPCS

## 2020-01-01 PROCEDURE — 88184 FLOWCYTOMETRY/ TC 1 MARKER: CPT

## 2020-01-01 PROCEDURE — 77012 CT SCAN FOR NEEDLE BIOPSY: CPT

## 2020-01-01 PROCEDURE — 96365 THER/PROPH/DIAG IV INF INIT: CPT

## 2020-01-01 PROCEDURE — 99233 SBSQ HOSP IP/OBS HIGH 50: CPT | Performed by: INTERNAL MEDICINE

## 2020-01-01 PROCEDURE — 87088 URINE BACTERIA CULTURE: CPT

## 2020-01-01 PROCEDURE — 99222 1ST HOSP IP/OBS MODERATE 55: CPT | Performed by: INTERNAL MEDICINE

## 2020-01-01 PROCEDURE — 74011000258 HC RX REV CODE- 258: Performed by: NURSE PRACTITIONER

## 2020-01-01 PROCEDURE — 07DR3ZX EXTRACTION OF ILIAC BONE MARROW, PERCUTANEOUS APPROACH, DIAGNOSTIC: ICD-10-PCS | Performed by: RADIOLOGY

## 2020-01-01 PROCEDURE — 99239 HOSP IP/OBS DSCHRG MGMT >30: CPT | Performed by: INTERNAL MEDICINE

## 2020-01-01 PROCEDURE — 88311 DECALCIFY TISSUE: CPT

## 2020-01-01 PROCEDURE — 65270000015 HC RM PRIVATE ONCOLOGY

## 2020-01-01 PROCEDURE — 87040 BLOOD CULTURE FOR BACTERIA: CPT

## 2020-01-01 PROCEDURE — 74011250636 HC RX REV CODE- 250/636

## 2020-01-01 PROCEDURE — 88312 SPECIAL STAINS GROUP 1: CPT

## 2020-01-01 PROCEDURE — 85049 AUTOMATED PLATELET COUNT: CPT

## 2020-01-01 PROCEDURE — 82962 GLUCOSE BLOOD TEST: CPT

## 2020-01-01 PROCEDURE — 77030020263 HC SOL INJ SOD CL0.9% LFCR 1000ML

## 2020-01-01 PROCEDURE — 81003 URINALYSIS AUTO W/O SCOPE: CPT

## 2020-01-01 PROCEDURE — 86850 RBC ANTIBODY SCREEN: CPT

## 2020-01-01 PROCEDURE — 88264 CHROMOSOME ANALYSIS 20-25: CPT

## 2020-01-01 PROCEDURE — 99238 HOSP IP/OBS DSCHRG MGMT 30/<: CPT | Performed by: INTERNAL MEDICINE

## 2020-01-01 PROCEDURE — 71046 X-RAY EXAM CHEST 2 VIEWS: CPT

## 2020-01-01 PROCEDURE — 96367 TX/PROPH/DG ADDL SEQ IV INF: CPT

## 2020-01-01 PROCEDURE — 99152 MOD SED SAME PHYS/QHP 5/>YRS: CPT

## 2020-01-01 PROCEDURE — 80202 ASSAY OF VANCOMYCIN: CPT

## 2020-01-01 PROCEDURE — 87186 SC STD MICRODIL/AGAR DIL: CPT

## 2020-01-01 PROCEDURE — 99211 OFF/OP EST MAY X REQ PHY/QHP: CPT

## 2020-01-01 PROCEDURE — 87635 SARS-COV-2 COVID-19 AMP PRB: CPT

## 2020-01-01 PROCEDURE — 81015 MICROSCOPIC EXAM OF URINE: CPT

## 2020-01-01 PROCEDURE — 99231 SBSQ HOSP IP/OBS SF/LOW 25: CPT | Performed by: INTERNAL MEDICINE

## 2020-01-01 PROCEDURE — 3C1ZX8Z IRRIGATION OF INDWELLING DEVICE USING IRRIGATING SUBSTANCE, EXTERNAL APPROACH: ICD-10-PCS | Performed by: INTERNAL MEDICINE

## 2020-01-01 PROCEDURE — 88374 M/PHMTRC ALYS ISHQUANT/SEMIQ: CPT

## 2020-01-01 RX ORDER — DIPHENHYDRAMINE HCL 25 MG
25 CAPSULE ORAL
Status: DISCONTINUED | OUTPATIENT
Start: 2020-01-01 | End: 2020-01-01 | Stop reason: HOSPADM

## 2020-01-01 RX ORDER — ACETAMINOPHEN 325 MG/1
650 TABLET ORAL AS NEEDED
Status: ACTIVE | OUTPATIENT
Start: 2020-01-01 | End: 2020-01-01

## 2020-01-01 RX ORDER — SODIUM CHLORIDE 9 MG/ML
25 INJECTION, SOLUTION INTRAVENOUS CONTINUOUS
Status: ACTIVE | OUTPATIENT
Start: 2020-01-01 | End: 2020-01-01

## 2020-01-01 RX ORDER — GABAPENTIN 100 MG/1
200 CAPSULE ORAL
Status: DISCONTINUED | OUTPATIENT
Start: 2020-01-01 | End: 2020-01-01 | Stop reason: HOSPADM

## 2020-01-01 RX ORDER — ONDANSETRON 2 MG/ML
4 INJECTION INTRAMUSCULAR; INTRAVENOUS
Status: DISCONTINUED | OUTPATIENT
Start: 2020-01-01 | End: 2020-01-01 | Stop reason: HOSPADM

## 2020-01-01 RX ORDER — LORAZEPAM 1 MG/1
1 TABLET ORAL
Status: DISCONTINUED | OUTPATIENT
Start: 2020-01-01 | End: 2020-01-01 | Stop reason: HOSPADM

## 2020-01-01 RX ORDER — ACYCLOVIR 400 MG/1
400 TABLET ORAL 2 TIMES DAILY
Qty: 60 TAB | Refills: 0 | Status: SHIPPED | OUTPATIENT
Start: 2020-01-01 | End: 2020-01-01

## 2020-01-01 RX ORDER — SODIUM CHLORIDE 9 MG/ML
25 INJECTION, SOLUTION INTRAVENOUS CONTINUOUS
Status: DISCONTINUED | OUTPATIENT
Start: 2020-01-01 | End: 2020-01-01 | Stop reason: HOSPADM

## 2020-01-01 RX ORDER — SODIUM CHLORIDE 0.9 % (FLUSH) 0.9 %
10-20 SYRINGE (ML) INJECTION AS NEEDED
Status: DISCONTINUED | OUTPATIENT
Start: 2020-01-01 | End: 2020-01-01 | Stop reason: HOSPADM

## 2020-01-01 RX ORDER — ONDANSETRON 2 MG/ML
8 INJECTION INTRAMUSCULAR; INTRAVENOUS EVERY 12 HOURS
Status: COMPLETED | OUTPATIENT
Start: 2020-01-01 | End: 2020-01-01

## 2020-01-01 RX ORDER — PANTOPRAZOLE SODIUM 40 MG/1
40 TABLET, DELAYED RELEASE ORAL
Status: DISCONTINUED | OUTPATIENT
Start: 2020-01-01 | End: 2020-01-01 | Stop reason: HOSPADM

## 2020-01-01 RX ORDER — DIPHENHYDRAMINE HCL 25 MG
25 CAPSULE ORAL ONCE
Status: COMPLETED | OUTPATIENT
Start: 2020-01-01 | End: 2020-01-01

## 2020-01-01 RX ORDER — ONDANSETRON 4 MG/1
8 TABLET, ORALLY DISINTEGRATING ORAL
Status: DISCONTINUED | OUTPATIENT
Start: 2020-01-01 | End: 2020-01-01 | Stop reason: HOSPADM

## 2020-01-01 RX ORDER — AZITHROMYCIN 500 MG/1
500 TABLET, FILM COATED ORAL DAILY
Qty: 7 TAB | Refills: 0 | Status: SHIPPED | OUTPATIENT
Start: 2020-01-01 | End: 2020-01-01 | Stop reason: SDUPTHER

## 2020-01-01 RX ORDER — SODIUM CHLORIDE 0.9 % (FLUSH) 0.9 %
10 SYRINGE (ML) INJECTION AS NEEDED
Status: ACTIVE | OUTPATIENT
Start: 2020-01-01 | End: 2020-01-01

## 2020-01-01 RX ORDER — SODIUM CHLORIDE 9 MG/ML
150 INJECTION, SOLUTION INTRAVENOUS CONTINUOUS
Status: DISCONTINUED | OUTPATIENT
Start: 2020-01-01 | End: 2020-01-01 | Stop reason: HOSPADM

## 2020-01-01 RX ORDER — VORICONAZOLE 200 MG/1
200 TABLET, FILM COATED ORAL EVERY 12 HOURS
Status: DISCONTINUED | OUTPATIENT
Start: 2020-01-01 | End: 2020-01-01 | Stop reason: HOSPADM

## 2020-01-01 RX ORDER — SODIUM CHLORIDE 9 MG/ML
250 INJECTION, SOLUTION INTRAVENOUS AS NEEDED
Status: DISCONTINUED | OUTPATIENT
Start: 2020-01-01 | End: 2020-01-01 | Stop reason: HOSPADM

## 2020-01-01 RX ORDER — EPINEPHRINE 1 MG/ML
0.3 INJECTION, SOLUTION, CONCENTRATE INTRAVENOUS AS NEEDED
Status: ACTIVE | OUTPATIENT
Start: 2020-01-01 | End: 2020-01-01

## 2020-01-01 RX ORDER — ACETAMINOPHEN 325 MG/1
650 TABLET ORAL ONCE
Status: COMPLETED | OUTPATIENT
Start: 2020-01-01 | End: 2020-01-01

## 2020-01-01 RX ORDER — DEXAMETHASONE SODIUM PHOSPHATE 4 MG/ML
8 INJECTION, SOLUTION INTRA-ARTICULAR; INTRALESIONAL; INTRAMUSCULAR; INTRAVENOUS; SOFT TISSUE ONCE
Status: COMPLETED | OUTPATIENT
Start: 2020-01-01 | End: 2020-01-01

## 2020-01-01 RX ORDER — LEVOFLOXACIN 500 MG/1
TABLET, FILM COATED ORAL
Status: ACTIVE
Start: 2020-01-01 | End: 2020-01-01

## 2020-01-01 RX ORDER — MIDAZOLAM HYDROCHLORIDE 1 MG/ML
.5-2 INJECTION, SOLUTION INTRAMUSCULAR; INTRAVENOUS
Status: DISCONTINUED | OUTPATIENT
Start: 2020-01-01 | End: 2020-01-01 | Stop reason: HOSPADM

## 2020-01-01 RX ORDER — ONDANSETRON 2 MG/ML
8 INJECTION INTRAMUSCULAR; INTRAVENOUS ONCE
Status: COMPLETED | OUTPATIENT
Start: 2020-01-01 | End: 2020-01-01

## 2020-01-01 RX ORDER — SODIUM CHLORIDE 0.9 % (FLUSH) 0.9 %
10 SYRINGE (ML) INJECTION EVERY 8 HOURS
Status: DISCONTINUED | OUTPATIENT
Start: 2020-01-01 | End: 2020-01-01 | Stop reason: HOSPADM

## 2020-01-01 RX ORDER — LEVOFLOXACIN 500 MG/1
500 TABLET, FILM COATED ORAL EVERY 24 HOURS
Qty: 10 TAB | Refills: 0 | Status: SHIPPED | OUTPATIENT
Start: 2020-01-01 | End: 2020-01-01 | Stop reason: SDUPTHER

## 2020-01-01 RX ORDER — HEPARIN 100 UNIT/ML
300 SYRINGE INTRAVENOUS AS NEEDED
Status: DISCONTINUED | OUTPATIENT
Start: 2020-01-01 | End: 2020-01-01 | Stop reason: HOSPADM

## 2020-01-01 RX ORDER — DIPHENHYDRAMINE HYDROCHLORIDE 50 MG/ML
INJECTION, SOLUTION INTRAMUSCULAR; INTRAVENOUS
Status: COMPLETED
Start: 2020-01-01 | End: 2020-01-01

## 2020-01-01 RX ORDER — SODIUM CHLORIDE 0.9 % (FLUSH) 0.9 %
10 SYRINGE (ML) INJECTION AS NEEDED
Status: DISCONTINUED | OUTPATIENT
Start: 2020-01-01 | End: 2020-01-01 | Stop reason: HOSPADM

## 2020-01-01 RX ORDER — ENOXAPARIN SODIUM 100 MG/ML
40 INJECTION SUBCUTANEOUS EVERY 24 HOURS
Status: DISCONTINUED | OUTPATIENT
Start: 2020-01-01 | End: 2020-01-01 | Stop reason: HOSPADM

## 2020-01-01 RX ORDER — SODIUM CHLORIDE 9 MG/ML
250 INJECTION, SOLUTION INTRAVENOUS AS NEEDED
Status: DISCONTINUED | OUTPATIENT
Start: 2020-01-01 | End: 2020-01-01

## 2020-01-01 RX ORDER — HYDROCODONE BITARTRATE AND ACETAMINOPHEN 7.5; 325 MG/1; MG/1
1 TABLET ORAL
Status: DISCONTINUED | OUTPATIENT
Start: 2020-01-01 | End: 2020-01-01 | Stop reason: HOSPADM

## 2020-01-01 RX ORDER — ZOLPIDEM TARTRATE 5 MG/1
10 TABLET ORAL
Status: DISCONTINUED | OUTPATIENT
Start: 2020-01-01 | End: 2020-01-01 | Stop reason: HOSPADM

## 2020-01-01 RX ORDER — SODIUM CHLORIDE 0.9 % (FLUSH) 0.9 %
10-30 SYRINGE (ML) INJECTION AS NEEDED
Status: DISCONTINUED | OUTPATIENT
Start: 2020-01-01 | End: 2020-01-01 | Stop reason: HOSPADM

## 2020-01-01 RX ORDER — ONDANSETRON 2 MG/ML
8 INJECTION INTRAMUSCULAR; INTRAVENOUS AS NEEDED
Status: ACTIVE | OUTPATIENT
Start: 2020-01-01 | End: 2020-01-01

## 2020-01-01 RX ORDER — HYDROCODONE BITARTRATE AND ACETAMINOPHEN 5; 325 MG/1; MG/1
1 TABLET ORAL
Status: DISCONTINUED | OUTPATIENT
Start: 2020-01-01 | End: 2020-01-01 | Stop reason: HOSPADM

## 2020-01-01 RX ORDER — HYDROCORTISONE 1 %
CREAM (GRAM) TOPICAL 2 TIMES DAILY
Status: DISCONTINUED | OUTPATIENT
Start: 2020-01-01 | End: 2020-01-01 | Stop reason: HOSPADM

## 2020-01-01 RX ORDER — DEXAMETHASONE SODIUM PHOSPHATE 4 MG/ML
4 INJECTION, SOLUTION INTRA-ARTICULAR; INTRALESIONAL; INTRAMUSCULAR; INTRAVENOUS; SOFT TISSUE 2 TIMES DAILY
Status: COMPLETED | OUTPATIENT
Start: 2020-01-01 | End: 2020-01-01

## 2020-01-01 RX ORDER — SODIUM CHLORIDE 0.9 % (FLUSH) 0.9 %
10-40 SYRINGE (ML) INJECTION AS NEEDED
Status: DISCONTINUED | OUTPATIENT
Start: 2020-01-01 | End: 2020-01-01 | Stop reason: HOSPADM

## 2020-01-01 RX ORDER — CYANOCOBALAMIN (VITAMIN B-12) 500 MCG
800 TABLET ORAL DAILY
Status: DISCONTINUED | OUTPATIENT
Start: 2020-01-01 | End: 2020-01-01 | Stop reason: HOSPADM

## 2020-01-01 RX ORDER — HEPARIN 100 UNIT/ML
300 SYRINGE INTRAVENOUS AS NEEDED
Status: DISPENSED | OUTPATIENT
Start: 2020-01-01 | End: 2020-01-01

## 2020-01-01 RX ORDER — DIPHENHYDRAMINE HCL 25 MG
25 CAPSULE ORAL
Status: CANCELLED | OUTPATIENT
Start: 2020-01-01

## 2020-01-01 RX ORDER — SODIUM CHLORIDE 0.9 % (FLUSH) 0.9 %
10-100 SYRINGE (ML) INJECTION AS NEEDED
Status: DISCONTINUED | OUTPATIENT
Start: 2020-01-01 | End: 2020-01-01 | Stop reason: HOSPADM

## 2020-01-01 RX ORDER — ACETAMINOPHEN 325 MG/1
650 TABLET ORAL
Status: COMPLETED | OUTPATIENT
Start: 2020-01-01 | End: 2020-01-01

## 2020-01-01 RX ORDER — HEPARIN 100 UNIT/ML
300 SYRINGE INTRAVENOUS AS NEEDED
Status: ACTIVE | OUTPATIENT
Start: 2020-01-01 | End: 2020-01-01

## 2020-01-01 RX ORDER — MIDAZOLAM HYDROCHLORIDE 1 MG/ML
.5-2 INJECTION, SOLUTION INTRAMUSCULAR; INTRAVENOUS
Status: DISCONTINUED | OUTPATIENT
Start: 2020-01-01 | End: 2020-01-01

## 2020-01-01 RX ORDER — LIDOCAINE HYDROCHLORIDE 20 MG/ML
2-10 INJECTION, SOLUTION INFILTRATION; PERINEURAL ONCE
Status: COMPLETED | OUTPATIENT
Start: 2020-01-01 | End: 2020-01-01

## 2020-01-01 RX ORDER — ACETAMINOPHEN 325 MG/1
650 TABLET ORAL
Status: DISCONTINUED | OUTPATIENT
Start: 2020-01-01 | End: 2020-01-01 | Stop reason: HOSPADM

## 2020-01-01 RX ORDER — HEPARIN 100 UNIT/ML
300-500 SYRINGE INTRAVENOUS AS NEEDED
Status: ACTIVE | OUTPATIENT
Start: 2020-01-01 | End: 2020-01-01

## 2020-01-01 RX ORDER — ALBUTEROL SULFATE 0.83 MG/ML
2.5 SOLUTION RESPIRATORY (INHALATION) AS NEEDED
Status: DISCONTINUED | OUTPATIENT
Start: 2020-01-01 | End: 2020-01-01 | Stop reason: HOSPADM

## 2020-01-01 RX ORDER — ONDANSETRON 2 MG/ML
8 INJECTION INTRAMUSCULAR; INTRAVENOUS 2 TIMES DAILY
Status: COMPLETED | OUTPATIENT
Start: 2020-01-01 | End: 2020-01-01

## 2020-01-01 RX ORDER — FENTANYL CITRATE 50 UG/ML
25-50 INJECTION, SOLUTION INTRAMUSCULAR; INTRAVENOUS
Status: DISCONTINUED | OUTPATIENT
Start: 2020-01-01 | End: 2020-01-01 | Stop reason: HOSPADM

## 2020-01-01 RX ORDER — LIDOCAINE AND PRILOCAINE 25; 25 MG/G; MG/G
CREAM TOPICAL AS NEEDED
Status: DISCONTINUED | OUTPATIENT
Start: 2020-01-01 | End: 2020-01-01 | Stop reason: HOSPADM

## 2020-01-01 RX ORDER — DIPHENHYDRAMINE HYDROCHLORIDE 50 MG/ML
50 INJECTION, SOLUTION INTRAMUSCULAR; INTRAVENOUS AS NEEDED
Status: DISCONTINUED | OUTPATIENT
Start: 2020-01-01 | End: 2020-01-01 | Stop reason: HOSPADM

## 2020-01-01 RX ORDER — SODIUM CHLORIDE 9 MG/ML
10 INJECTION INTRAMUSCULAR; INTRAVENOUS; SUBCUTANEOUS AS NEEDED
Status: ACTIVE | OUTPATIENT
Start: 2020-01-01 | End: 2020-01-01

## 2020-01-01 RX ORDER — DEXAMETHASONE SODIUM PHOSPHATE 4 MG/ML
4 INJECTION, SOLUTION INTRA-ARTICULAR; INTRALESIONAL; INTRAMUSCULAR; INTRAVENOUS; SOFT TISSUE EVERY 12 HOURS
Status: COMPLETED | OUTPATIENT
Start: 2020-01-01 | End: 2020-01-01

## 2020-01-01 RX ORDER — DIPHENHYDRAMINE HCL 25 MG
50 CAPSULE ORAL ONCE
Status: COMPLETED | OUTPATIENT
Start: 2020-01-01 | End: 2020-01-01

## 2020-01-01 RX ORDER — DIPHENHYDRAMINE HCL 25 MG
25 CAPSULE ORAL
Status: DISPENSED | OUTPATIENT
Start: 2020-01-01 | End: 2020-01-01

## 2020-01-01 RX ORDER — DULOXETIN HYDROCHLORIDE 30 MG/1
30 CAPSULE, DELAYED RELEASE ORAL DAILY
Status: DISCONTINUED | OUTPATIENT
Start: 2020-01-01 | End: 2020-01-01 | Stop reason: HOSPADM

## 2020-01-01 RX ORDER — LEVOFLOXACIN 500 MG/1
500 TABLET, FILM COATED ORAL EVERY 24 HOURS
Status: DISCONTINUED | OUTPATIENT
Start: 2020-01-01 | End: 2020-01-01 | Stop reason: HOSPADM

## 2020-01-01 RX ORDER — DEXAMETHASONE SODIUM PHOSPHATE 100 MG/10ML
4 INJECTION INTRAMUSCULAR; INTRAVENOUS EVERY 12 HOURS
Status: COMPLETED | OUTPATIENT
Start: 2020-01-01 | End: 2020-01-01

## 2020-01-01 RX ORDER — DIPHENHYDRAMINE HYDROCHLORIDE 50 MG/ML
25 INJECTION, SOLUTION INTRAMUSCULAR; INTRAVENOUS AS NEEDED
Status: ACTIVE | OUTPATIENT
Start: 2020-01-01 | End: 2020-01-01

## 2020-01-01 RX ORDER — DIPHENHYDRAMINE HYDROCHLORIDE 50 MG/ML
50 INJECTION, SOLUTION INTRAMUSCULAR; INTRAVENOUS AS NEEDED
Status: ACTIVE | OUTPATIENT
Start: 2020-01-01 | End: 2020-01-01

## 2020-01-01 RX ORDER — ONDANSETRON 2 MG/ML
INJECTION INTRAMUSCULAR; INTRAVENOUS
Status: ACTIVE
Start: 2020-01-01 | End: 2020-01-01

## 2020-01-01 RX ORDER — POLYETHYLENE GLYCOL 3350 17 G/17G
17 POWDER, FOR SOLUTION ORAL DAILY PRN
Status: DISCONTINUED | OUTPATIENT
Start: 2020-01-01 | End: 2020-01-01 | Stop reason: HOSPADM

## 2020-01-01 RX ORDER — POLYETHYLENE GLYCOL 3350 17 G/17G
17 POWDER, FOR SOLUTION ORAL DAILY
Status: DISCONTINUED | OUTPATIENT
Start: 2020-01-01 | End: 2020-01-01

## 2020-01-01 RX ORDER — SODIUM CHLORIDE 9 MG/ML
500 INJECTION, SOLUTION INTRAVENOUS ONCE
Status: COMPLETED | OUTPATIENT
Start: 2020-01-01 | End: 2020-01-01

## 2020-01-01 RX ORDER — DIPHENHYDRAMINE HYDROCHLORIDE 50 MG/ML
25-50 INJECTION, SOLUTION INTRAMUSCULAR; INTRAVENOUS
Status: DISCONTINUED | OUTPATIENT
Start: 2020-01-01 | End: 2020-01-01

## 2020-01-01 RX ORDER — PREDNISOLONE ACETATE 10 MG/ML
2 SUSPENSION/ DROPS OPHTHALMIC EVERY 6 HOURS
Qty: 5 ML | Refills: 0 | Status: SHIPPED | OUTPATIENT
Start: 2020-01-01 | End: 2020-01-01 | Stop reason: ALTCHOICE

## 2020-01-01 RX ORDER — ALBUTEROL SULFATE 0.83 MG/ML
2.5 SOLUTION RESPIRATORY (INHALATION) AS NEEDED
Status: ACTIVE | OUTPATIENT
Start: 2020-01-01 | End: 2020-01-01

## 2020-01-01 RX ORDER — FENTANYL CITRATE 50 UG/ML
25-100 INJECTION, SOLUTION INTRAMUSCULAR; INTRAVENOUS
Status: DISCONTINUED | OUTPATIENT
Start: 2020-01-01 | End: 2020-01-01

## 2020-01-01 RX ORDER — HEPARIN 100 UNIT/ML
300 SYRINGE INTRAVENOUS AS NEEDED
Status: DISCONTINUED | OUTPATIENT
Start: 2020-01-01 | End: 2020-01-01

## 2020-01-01 RX ORDER — HEPARIN 100 UNIT/ML
500 SYRINGE INTRAVENOUS AS NEEDED
Status: DISCONTINUED | OUTPATIENT
Start: 2020-01-01 | End: 2020-01-01 | Stop reason: HOSPADM

## 2020-01-01 RX ORDER — LEVOFLOXACIN 500 MG/1
500 TABLET, FILM COATED ORAL DAILY
Status: DISCONTINUED | OUTPATIENT
Start: 2020-01-01 | End: 2020-01-01 | Stop reason: HOSPADM

## 2020-01-01 RX ORDER — HEPARIN 100 UNIT/ML
300-500 SYRINGE INTRAVENOUS AS NEEDED
Status: DISCONTINUED | OUTPATIENT
Start: 2020-01-01 | End: 2020-01-01 | Stop reason: HOSPADM

## 2020-01-01 RX ORDER — ACETAMINOPHEN 325 MG/1
650 TABLET ORAL AS NEEDED
Status: DISCONTINUED | OUTPATIENT
Start: 2020-01-01 | End: 2020-01-01 | Stop reason: HOSPADM

## 2020-01-01 RX ORDER — SODIUM CHLORIDE 9 MG/ML
10 INJECTION INTRAMUSCULAR; INTRAVENOUS; SUBCUTANEOUS AS NEEDED
Status: DISCONTINUED | OUTPATIENT
Start: 2020-01-01 | End: 2020-01-01 | Stop reason: HOSPADM

## 2020-01-01 RX ORDER — SODIUM CHLORIDE 9 MG/ML
75 INJECTION, SOLUTION INTRAVENOUS CONTINUOUS
Status: DISCONTINUED | OUTPATIENT
Start: 2020-01-01 | End: 2020-01-01 | Stop reason: HOSPADM

## 2020-01-01 RX ORDER — DIPHENHYDRAMINE HCL 25 MG
25 CAPSULE ORAL
Status: DISCONTINUED | OUTPATIENT
Start: 2020-01-01 | End: 2020-01-01

## 2020-01-01 RX ORDER — HEPARIN SODIUM (PORCINE) LOCK FLUSH IV SOLN 100 UNIT/ML 100 UNIT/ML
500 SOLUTION INTRAVENOUS ONCE
Status: COMPLETED | OUTPATIENT
Start: 2020-01-01 | End: 2020-01-01

## 2020-01-01 RX ORDER — SODIUM CHLORIDE 0.9 % (FLUSH) 0.9 %
10-60 SYRINGE (ML) INJECTION AS NEEDED
Status: DISCONTINUED | OUTPATIENT
Start: 2020-01-01 | End: 2020-01-01 | Stop reason: HOSPADM

## 2020-01-01 RX ORDER — ACYCLOVIR 800 MG/1
400 TABLET ORAL 2 TIMES DAILY
Status: DISCONTINUED | OUTPATIENT
Start: 2020-01-01 | End: 2020-01-01 | Stop reason: HOSPADM

## 2020-01-01 RX ORDER — SODIUM CHLORIDE 0.9 % (FLUSH) 0.9 %
10 SYRINGE (ML) INJECTION AS NEEDED
Status: DISCONTINUED | OUTPATIENT
Start: 2020-01-01 | End: 2020-01-01

## 2020-01-01 RX ORDER — PREDNISOLONE ACETATE 10 MG/ML
2 SUSPENSION/ DROPS OPHTHALMIC EVERY 6 HOURS
Qty: 15 ML | Refills: 1 | Status: SHIPPED | OUTPATIENT
Start: 2020-01-01 | End: 2020-01-01 | Stop reason: ALTCHOICE

## 2020-01-01 RX ORDER — SODIUM CHLORIDE 9 MG/ML
250 INJECTION, SOLUTION INTRAVENOUS AS NEEDED
Status: CANCELLED | OUTPATIENT
Start: 2020-01-01

## 2020-01-01 RX ORDER — POTASSIUM CHLORIDE 20 MEQ/1
20 TABLET, EXTENDED RELEASE ORAL DAILY
Status: DISCONTINUED | OUTPATIENT
Start: 2020-01-01 | End: 2020-01-01 | Stop reason: HOSPADM

## 2020-01-01 RX ORDER — PRAVASTATIN SODIUM 20 MG/1
10 TABLET ORAL
Status: DISCONTINUED | OUTPATIENT
Start: 2020-01-01 | End: 2020-01-01 | Stop reason: HOSPADM

## 2020-01-01 RX ORDER — DEXAMETHASONE SODIUM PHOSPHATE 4 MG/ML
INJECTION, SOLUTION INTRA-ARTICULAR; INTRALESIONAL; INTRAMUSCULAR; INTRAVENOUS; SOFT TISSUE
Status: ACTIVE
Start: 2020-01-01 | End: 2020-01-01

## 2020-01-01 RX ORDER — POLYETHYLENE GLYCOL 3350 17 G/17G
17 POWDER, FOR SOLUTION ORAL DAILY
Status: DISCONTINUED | OUTPATIENT
Start: 2020-01-01 | End: 2020-01-01 | Stop reason: HOSPADM

## 2020-01-01 RX ORDER — MORPHINE SULFATE 2 MG/ML
2 INJECTION, SOLUTION INTRAMUSCULAR; INTRAVENOUS
Status: DISCONTINUED | OUTPATIENT
Start: 2020-01-01 | End: 2020-01-01 | Stop reason: HOSPADM

## 2020-01-01 RX ORDER — MAGNESIUM SULFATE HEPTAHYDRATE 40 MG/ML
4 INJECTION, SOLUTION INTRAVENOUS ONCE
Status: COMPLETED | OUTPATIENT
Start: 2020-01-01 | End: 2020-01-01

## 2020-01-01 RX ORDER — ACETAMINOPHEN 325 MG/1
650 TABLET ORAL
Status: DISPENSED | OUTPATIENT
Start: 2020-01-01 | End: 2020-01-01

## 2020-01-01 RX ORDER — DIPHENHYDRAMINE HYDROCHLORIDE 50 MG/ML
25 INJECTION, SOLUTION INTRAMUSCULAR; INTRAVENOUS ONCE
Status: COMPLETED | OUTPATIENT
Start: 2020-01-01 | End: 2020-01-01

## 2020-01-01 RX ORDER — SODIUM CHLORIDE 0.9 % (FLUSH) 0.9 %
10 SYRINGE (ML) INJECTION AS NEEDED
Status: DISCONTINUED | OUTPATIENT
Start: 2020-01-01 | End: 2021-01-01 | Stop reason: HOSPADM

## 2020-01-01 RX ORDER — SODIUM CHLORIDE 9 MG/ML
25 INJECTION, SOLUTION INTRAVENOUS ONCE
Status: ACTIVE | OUTPATIENT
Start: 2020-01-01 | End: 2020-01-01

## 2020-01-01 RX ORDER — LIDOCAINE HYDROCHLORIDE 20 MG/ML
2-20 INJECTION, SOLUTION INFILTRATION; PERINEURAL ONCE
Status: COMPLETED | OUTPATIENT
Start: 2020-01-01 | End: 2020-01-01

## 2020-01-01 RX ORDER — ACYCLOVIR 400 MG/1
400 TABLET ORAL 2 TIMES DAILY
COMMUNITY
End: 2020-01-01 | Stop reason: SDUPTHER

## 2020-01-01 RX ORDER — VANCOMYCIN HYDROCHLORIDE
1250 ONCE
Status: COMPLETED | OUTPATIENT
Start: 2020-01-01 | End: 2020-01-01

## 2020-01-01 RX ORDER — SODIUM CHLORIDE 0.9 % (FLUSH) 0.9 %
10 SYRINGE (ML) INJECTION AS NEEDED
Status: ACTIVE | OUTPATIENT
Start: 2020-01-01

## 2020-01-01 RX ORDER — PREDNISOLONE ACETATE 10 MG/ML
2 SUSPENSION/ DROPS OPHTHALMIC EVERY 6 HOURS
Status: DISCONTINUED | OUTPATIENT
Start: 2020-01-01 | End: 2020-01-01 | Stop reason: HOSPADM

## 2020-01-01 RX ORDER — ZOLPIDEM TARTRATE 5 MG/1
5 TABLET ORAL
Status: DISCONTINUED | OUTPATIENT
Start: 2020-01-01 | End: 2020-01-01

## 2020-01-01 RX ORDER — SODIUM CHLORIDE 9 MG/ML
50 INJECTION, SOLUTION INTRAVENOUS CONTINUOUS
Status: DISCONTINUED | OUTPATIENT
Start: 2020-01-01 | End: 2020-01-01

## 2020-01-01 RX ORDER — EPINEPHRINE 1 MG/ML
0.3 INJECTION, SOLUTION, CONCENTRATE INTRAVENOUS AS NEEDED
Status: DISCONTINUED | OUTPATIENT
Start: 2020-01-01 | End: 2020-01-01 | Stop reason: HOSPADM

## 2020-01-01 RX ORDER — ACETAMINOPHEN 325 MG/1
650 TABLET ORAL ONCE
Status: CANCELLED | OUTPATIENT
Start: 2020-01-01 | End: 2020-01-01

## 2020-01-01 RX ORDER — ACETAMINOPHEN 325 MG/1
650 TABLET ORAL ONCE
Status: DISPENSED | OUTPATIENT
Start: 2020-01-01 | End: 2020-01-01

## 2020-01-01 RX ORDER — VANCOMYCIN 1.75 GRAM/500 ML IN 0.9 % SODIUM CHLORIDE INTRAVENOUS
1750 ONCE
Status: DISCONTINUED | OUTPATIENT
Start: 2020-01-01 | End: 2020-01-01

## 2020-01-01 RX ORDER — HYDROCORTISONE SODIUM SUCCINATE 100 MG/2ML
100 INJECTION, POWDER, FOR SOLUTION INTRAMUSCULAR; INTRAVENOUS AS NEEDED
Status: ACTIVE | OUTPATIENT
Start: 2020-01-01 | End: 2020-01-01

## 2020-01-01 RX ORDER — ACETAMINOPHEN 325 MG/1
650 TABLET ORAL ONCE
Status: ACTIVE | OUTPATIENT
Start: 2020-01-01 | End: 2020-01-01

## 2020-01-01 RX ORDER — SODIUM CHLORIDE 0.9 % (FLUSH) 0.9 %
10 SYRINGE (ML) INJECTION EVERY 8 HOURS
Status: DISCONTINUED | OUTPATIENT
Start: 2020-01-01 | End: 2020-01-01

## 2020-01-01 RX ORDER — LANOLIN ALCOHOL/MO/W.PET/CERES
400 CREAM (GRAM) TOPICAL DAILY
Status: DISCONTINUED | OUTPATIENT
Start: 2020-01-01 | End: 2020-01-01 | Stop reason: HOSPADM

## 2020-01-01 RX ORDER — VANCOMYCIN/0.9 % SOD CHLORIDE 1.5G/250ML
1500 PLASTIC BAG, INJECTION (ML) INTRAVENOUS EVERY 12 HOURS
Status: DISCONTINUED | OUTPATIENT
Start: 2020-01-01 | End: 2020-01-01

## 2020-01-01 RX ORDER — PANTOPRAZOLE SODIUM 40 MG/1
40 TABLET, DELAYED RELEASE ORAL
Qty: 60 TAB | Refills: 0 | Status: SHIPPED | OUTPATIENT
Start: 2020-01-01 | End: 2020-01-01 | Stop reason: SDUPTHER

## 2020-01-01 RX ORDER — PANTOPRAZOLE SODIUM 40 MG/1
40 TABLET, DELAYED RELEASE ORAL
Qty: 180 TAB | Refills: 0 | Status: SHIPPED | OUTPATIENT
Start: 2020-01-01 | End: 2020-01-01 | Stop reason: ALTCHOICE

## 2020-01-01 RX ORDER — ONDANSETRON 2 MG/ML
8 INJECTION INTRAMUSCULAR; INTRAVENOUS AS NEEDED
Status: DISCONTINUED | OUTPATIENT
Start: 2020-01-01 | End: 2020-01-01 | Stop reason: HOSPADM

## 2020-01-01 RX ORDER — VANCOMYCIN HYDROCHLORIDE
1250 EVERY 12 HOURS
Status: DISCONTINUED | OUTPATIENT
Start: 2020-01-01 | End: 2020-01-01 | Stop reason: HOSPADM

## 2020-01-01 RX ORDER — HYDROCORTISONE SODIUM SUCCINATE 100 MG/2ML
100 INJECTION, POWDER, FOR SOLUTION INTRAMUSCULAR; INTRAVENOUS AS NEEDED
Status: DISCONTINUED | OUTPATIENT
Start: 2020-01-01 | End: 2020-01-01 | Stop reason: HOSPADM

## 2020-01-01 RX ORDER — AMOXICILLIN AND CLAVULANATE POTASSIUM 875; 125 MG/1; MG/1
1 TABLET, FILM COATED ORAL EVERY 12 HOURS
Qty: 14 TAB | Refills: 0 | Status: SHIPPED | OUTPATIENT
Start: 2020-01-01 | End: 2020-01-01 | Stop reason: ALTCHOICE

## 2020-01-01 RX ORDER — SODIUM CHLORIDE 9 MG/ML
250 INJECTION, SOLUTION INTRAVENOUS AS NEEDED
Status: DISCONTINUED | OUTPATIENT
Start: 2020-01-01 | End: 2021-01-01 | Stop reason: HOSPADM

## 2020-01-01 RX ORDER — AMOXICILLIN 250 MG
1 CAPSULE ORAL DAILY
Status: DISCONTINUED | OUTPATIENT
Start: 2020-01-01 | End: 2020-01-01 | Stop reason: HOSPADM

## 2020-01-01 RX ORDER — DIPHENHYDRAMINE HCL 25 MG
25 CAPSULE ORAL
Status: ACTIVE | OUTPATIENT
Start: 2020-01-01 | End: 2020-01-01

## 2020-01-01 RX ORDER — ALPRAZOLAM 0.5 MG/1
1 TABLET ORAL
Status: DISCONTINUED | OUTPATIENT
Start: 2020-01-01 | End: 2020-01-01 | Stop reason: HOSPADM

## 2020-01-01 RX ORDER — SODIUM CHLORIDE 0.9 % (FLUSH) 0.9 %
20 SYRINGE (ML) INJECTION AS NEEDED
Status: DISCONTINUED | OUTPATIENT
Start: 2020-01-01 | End: 2020-01-01 | Stop reason: HOSPADM

## 2020-01-01 RX ORDER — TRIAMCINOLONE ACETONIDE 1 MG/G
CREAM TOPICAL 2 TIMES DAILY
Qty: 15 G | Refills: 1 | Status: SHIPPED | OUTPATIENT
Start: 2020-01-01

## 2020-01-01 RX ORDER — ACETAMINOPHEN 325 MG/1
650 TABLET ORAL ONCE
Status: DISCONTINUED | OUTPATIENT
Start: 2020-01-01 | End: 2020-01-01

## 2020-01-01 RX ORDER — ACYCLOVIR 400 MG/1
400 TABLET ORAL 2 TIMES DAILY
Qty: 60 TAB | Refills: 0 | Status: ON HOLD | OUTPATIENT
Start: 2020-01-01 | End: 2020-01-01 | Stop reason: SDUPTHER

## 2020-01-01 RX ADMIN — Medication 10 ML: at 13:36

## 2020-01-01 RX ADMIN — PREDNISOLONE ACETATE 2 DROP: 10 SUSPENSION/ DROPS OPHTHALMIC at 00:00

## 2020-01-01 RX ADMIN — VANCOMYCIN HYDROCHLORIDE 1000 MG: 1 INJECTION, POWDER, LYOPHILIZED, FOR SOLUTION INTRAVENOUS at 22:09

## 2020-01-01 RX ADMIN — ALTEPLASE 1 MG: KIT at 11:48

## 2020-01-01 RX ADMIN — MIDAZOLAM 1 MG: 1 INJECTION INTRAMUSCULAR; INTRAVENOUS at 08:14

## 2020-01-01 RX ADMIN — POLYETHYLENE GLYCOL 3350 17 G: 17 POWDER, FOR SOLUTION ORAL at 08:12

## 2020-01-01 RX ADMIN — FENTANYL CITRATE 50 MCG: 50 INJECTION INTRAMUSCULAR; INTRAVENOUS at 15:53

## 2020-01-01 RX ADMIN — VANCOMYCIN HYDROCHLORIDE 1500 MG: 10 INJECTION, POWDER, LYOPHILIZED, FOR SOLUTION INTRAVENOUS at 21:56

## 2020-01-01 RX ADMIN — SODIUM CHLORIDE 75 ML/HR: 900 INJECTION, SOLUTION INTRAVENOUS at 23:30

## 2020-01-01 RX ADMIN — Medication 10 ML: at 16:05

## 2020-01-01 RX ADMIN — LORAZEPAM 1 MG: 1 TABLET ORAL at 21:24

## 2020-01-01 RX ADMIN — ACETAMINOPHEN: 500 TABLET, FILM COATED ORAL at 21:36

## 2020-01-01 RX ADMIN — AZACITIDINE 151 MG: 100 INJECTION, POWDER, LYOPHILIZED, FOR SOLUTION INTRAVENOUS; SUBCUTANEOUS at 15:20

## 2020-01-01 RX ADMIN — AZACITIDINE 151 MG: 100 INJECTION, POWDER, LYOPHILIZED, FOR SOLUTION INTRAVENOUS; SUBCUTANEOUS at 15:32

## 2020-01-01 RX ADMIN — ALLOPURINOL 300 MG: 300 TABLET ORAL at 08:11

## 2020-01-01 RX ADMIN — SODIUM CHLORIDE 250 ML: 900 INJECTION, SOLUTION INTRAVENOUS at 09:18

## 2020-01-01 RX ADMIN — TBO-FILGRASTIM 480 MCG: 480 INJECTION, SOLUTION SUBCUTANEOUS at 22:29

## 2020-01-01 RX ADMIN — ENOXAPARIN SODIUM 40 MG: 40 INJECTION SUBCUTANEOUS at 19:44

## 2020-01-01 RX ADMIN — PREDNISOLONE ACETATE 2 DROP: 10 SUSPENSION/ DROPS OPHTHALMIC at 01:03

## 2020-01-01 RX ADMIN — ONDANSETRON 8 MG: 2 INJECTION INTRAMUSCULAR; INTRAVENOUS at 14:04

## 2020-01-01 RX ADMIN — ONDANSETRON 8 MG: 2 INJECTION INTRAMUSCULAR; INTRAVENOUS at 11:06

## 2020-01-01 RX ADMIN — ZOLPIDEM TARTRATE 10 MG: 5 TABLET ORAL at 21:22

## 2020-01-01 RX ADMIN — DULOXETINE HYDROCHLORIDE 30 MG: 30 CAPSULE, DELAYED RELEASE ORAL at 07:49

## 2020-01-01 RX ADMIN — AZACITIDINE 92 MG: 100 INJECTION, POWDER, LYOPHILIZED, FOR SOLUTION INTRAVENOUS; SUBCUTANEOUS at 16:03

## 2020-01-01 RX ADMIN — DIPHENHYDRAMINE HYDROCHLORIDE 25 MG: 25 CAPSULE ORAL at 20:22

## 2020-01-01 RX ADMIN — DIPHENHYDRAMINE HYDROCHLORIDE 25 MG: 25 CAPSULE ORAL at 12:47

## 2020-01-01 RX ADMIN — Medication 10 ML: at 21:39

## 2020-01-01 RX ADMIN — ALTEPLASE 2 MG: 2.2 INJECTION, POWDER, LYOPHILIZED, FOR SOLUTION INTRAVENOUS at 09:27

## 2020-01-01 RX ADMIN — ALTEPLASE 2 MG: 2.2 INJECTION, POWDER, LYOPHILIZED, FOR SOLUTION INTRAVENOUS at 09:10

## 2020-01-01 RX ADMIN — Medication 2 TABLET: at 08:02

## 2020-01-01 RX ADMIN — Medication 10 ML: at 09:18

## 2020-01-01 RX ADMIN — LORAZEPAM 1 MG: 1 TABLET ORAL at 21:53

## 2020-01-01 RX ADMIN — PREDNISOLONE ACETATE 2 DROP: 10 SUSPENSION/ DROPS OPHTHALMIC at 23:01

## 2020-01-01 RX ADMIN — ENOXAPARIN SODIUM 40 MG: 40 INJECTION SUBCUTANEOUS at 10:56

## 2020-01-01 RX ADMIN — SODIUM CHLORIDE 25 ML/HR: 900 INJECTION, SOLUTION INTRAVENOUS at 15:45

## 2020-01-01 RX ADMIN — SODIUM CHLORIDE 75 ML/HR: 900 INJECTION, SOLUTION INTRAVENOUS at 10:03

## 2020-01-01 RX ADMIN — SODIUM CHLORIDE 25 ML/HR: 900 INJECTION, SOLUTION INTRAVENOUS at 10:15

## 2020-01-01 RX ADMIN — ACETAMINOPHEN 650 MG: 325 TABLET, FILM COATED ORAL at 15:00

## 2020-01-01 RX ADMIN — ONDANSETRON 8 MG: 2 INJECTION INTRAMUSCULAR; INTRAVENOUS at 18:17

## 2020-01-01 RX ADMIN — Medication 20 ML: at 08:25

## 2020-01-01 RX ADMIN — ALLOPURINOL 300 MG: 300 TABLET ORAL at 08:26

## 2020-01-01 RX ADMIN — LORAZEPAM 1 MG: 1 TABLET ORAL at 21:36

## 2020-01-01 RX ADMIN — DEXAMETHASONE SODIUM PHOSPHATE 8 MG: 4 INJECTION, SOLUTION INTRAMUSCULAR; INTRAVENOUS at 10:23

## 2020-01-01 RX ADMIN — DULOXETINE HYDROCHLORIDE 30 MG: 30 CAPSULE, DELAYED RELEASE ORAL at 07:59

## 2020-01-01 RX ADMIN — POTASSIUM CHLORIDE 20 MEQ: 1500 TABLET, EXTENDED RELEASE ORAL at 08:58

## 2020-01-01 RX ADMIN — PRAVASTATIN SODIUM 10 MG: 20 TABLET ORAL at 21:42

## 2020-01-01 RX ADMIN — ONDANSETRON 8 MG: 2 INJECTION INTRAMUSCULAR; INTRAVENOUS at 11:16

## 2020-01-01 RX ADMIN — ACYCLOVIR 400 MG: 800 TABLET ORAL at 07:48

## 2020-01-01 RX ADMIN — AZACITIDINE 151 MG: 100 INJECTION, POWDER, LYOPHILIZED, FOR SOLUTION INTRAVENOUS; SUBCUTANEOUS at 16:25

## 2020-01-01 RX ADMIN — SODIUM CHLORIDE 250 ML: 900 INJECTION, SOLUTION INTRAVENOUS at 14:45

## 2020-01-01 RX ADMIN — LEVOFLOXACIN 500 MG: 500 TABLET, FILM COATED ORAL at 11:24

## 2020-01-01 RX ADMIN — PRAVASTATIN SODIUM 10 MG: 20 TABLET ORAL at 21:36

## 2020-01-01 RX ADMIN — VORICONAZOLE 200 MG: 200 TABLET, FILM COATED ORAL at 08:41

## 2020-01-01 RX ADMIN — DIPHENHYDRAMINE HYDROCHLORIDE 25 MG: 25 CAPSULE ORAL at 10:52

## 2020-01-01 RX ADMIN — SODIUM CHLORIDE 25 ML/HR: 9 INJECTION, SOLUTION INTRAVENOUS at 14:50

## 2020-01-01 RX ADMIN — ACYCLOVIR 400 MG: 800 TABLET ORAL at 16:28

## 2020-01-01 RX ADMIN — PREDNISOLONE ACETATE 2 DROP: 10 SUSPENSION/ DROPS OPHTHALMIC at 18:00

## 2020-01-01 RX ADMIN — Medication 10 ML: at 11:52

## 2020-01-01 RX ADMIN — Medication 2 TABLET: at 08:41

## 2020-01-01 RX ADMIN — MIDAZOLAM 0.5 MG: 1 INJECTION INTRAMUSCULAR; INTRAVENOUS at 15:57

## 2020-01-01 RX ADMIN — PREDNISOLONE ACETATE 2 DROP: 10 SUSPENSION/ DROPS OPHTHALMIC at 17:10

## 2020-01-01 RX ADMIN — LEVOFLOXACIN 500 MG: 500 TABLET, FILM COATED ORAL at 10:40

## 2020-01-01 RX ADMIN — VORICONAZOLE 200 MG: 200 TABLET, FILM COATED ORAL at 22:07

## 2020-01-01 RX ADMIN — Medication 2 TABLET: at 08:45

## 2020-01-01 RX ADMIN — LORAZEPAM 1 MG: 1 TABLET ORAL at 22:00

## 2020-01-01 RX ADMIN — DULOXETINE HYDROCHLORIDE 30 MG: 30 CAPSULE, DELAYED RELEASE ORAL at 09:29

## 2020-01-01 RX ADMIN — DEXAMETHASONE SODIUM PHOSPHATE 4 MG: 4 INJECTION, SOLUTION INTRAMUSCULAR; INTRAVENOUS at 14:04

## 2020-01-01 RX ADMIN — ONDANSETRON 8 MG: 2 INJECTION INTRAMUSCULAR; INTRAVENOUS at 17:27

## 2020-01-01 RX ADMIN — ONDANSETRON 8 MG: 2 INJECTION INTRAMUSCULAR; INTRAVENOUS at 15:27

## 2020-01-01 RX ADMIN — ALPRAZOLAM 1 MG: 0.5 TABLET ORAL at 21:40

## 2020-01-01 RX ADMIN — DULOXETINE 30 MG: 30 CAPSULE, DELAYED RELEASE ORAL at 08:34

## 2020-01-01 RX ADMIN — CEFEPIME HYDROCHLORIDE 2 G: 2 INJECTION, POWDER, FOR SOLUTION INTRAVENOUS at 21:41

## 2020-01-01 RX ADMIN — ALLOPURINOL 300 MG: 300 TABLET ORAL at 08:34

## 2020-01-01 RX ADMIN — ACYCLOVIR 400 MG: 800 TABLET ORAL at 07:54

## 2020-01-01 RX ADMIN — VANCOMYCIN HYDROCHLORIDE 1250 MG: 10 INJECTION, POWDER, LYOPHILIZED, FOR SOLUTION INTRAVENOUS at 17:57

## 2020-01-01 RX ADMIN — ACETAMINOPHEN 650 MG: 325 TABLET, FILM COATED ORAL at 05:21

## 2020-01-01 RX ADMIN — FENTANYL CITRATE 50 MCG: 50 INJECTION, SOLUTION INTRAMUSCULAR; INTRAVENOUS at 14:24

## 2020-01-01 RX ADMIN — Medication 10 ML: at 21:20

## 2020-01-01 RX ADMIN — CYTARABINE 2895 MG: 2 INJECTION INTRATHECAL; INTRAVENOUS; SUBCUTANEOUS at 23:40

## 2020-01-01 RX ADMIN — ACETAMINOPHEN 500 MG: 325 TABLET, FILM COATED ORAL at 22:30

## 2020-01-01 RX ADMIN — VORICONAZOLE 200 MG: 200 TABLET, FILM COATED ORAL at 09:03

## 2020-01-01 RX ADMIN — VORICONAZOLE 200 MG: 200 TABLET, FILM COATED ORAL at 07:48

## 2020-01-01 RX ADMIN — ONDANSETRON 8 MG: 2 INJECTION INTRAMUSCULAR; INTRAVENOUS at 15:35

## 2020-01-01 RX ADMIN — DEXAMETHASONE SODIUM PHOSPHATE 4 MG: 4 INJECTION, SOLUTION INTRAMUSCULAR; INTRAVENOUS at 17:27

## 2020-01-01 RX ADMIN — DEXAMETHASONE SODIUM PHOSPHATE 8 MG: 4 INJECTION, SOLUTION INTRAMUSCULAR; INTRAVENOUS at 16:02

## 2020-01-01 RX ADMIN — Medication 10 ML: at 11:44

## 2020-01-01 RX ADMIN — TBO-FILGRASTIM 300 MCG: 300 INJECTION, SOLUTION SUBCUTANEOUS at 11:32

## 2020-01-01 RX ADMIN — PREDNISOLONE ACETATE 2 DROP: 10 SUSPENSION/ DROPS OPHTHALMIC at 23:57

## 2020-01-01 RX ADMIN — SODIUM CHLORIDE 10 ML: 9 INJECTION INTRAMUSCULAR; INTRAVENOUS; SUBCUTANEOUS at 10:14

## 2020-01-01 RX ADMIN — PREDNISOLONE ACETATE 2 DROP: 10 SUSPENSION/ DROPS OPHTHALMIC at 16:43

## 2020-01-01 RX ADMIN — FENTANYL CITRATE 25 MCG: 50 INJECTION INTRAMUSCULAR; INTRAVENOUS at 15:58

## 2020-01-01 RX ADMIN — POTASSIUM CHLORIDE 20 MEQ: 20 TABLET, EXTENDED RELEASE ORAL at 08:42

## 2020-01-01 RX ADMIN — PREDNISOLONE ACETATE 2 DROP: 10 SUSPENSION/ DROPS OPHTHALMIC at 13:37

## 2020-01-01 RX ADMIN — SODIUM CHLORIDE 10 ML: 9 INJECTION INTRAMUSCULAR; INTRAVENOUS; SUBCUTANEOUS at 10:18

## 2020-01-01 RX ADMIN — ACYCLOVIR 400 MG: 800 TABLET ORAL at 08:29

## 2020-01-01 RX ADMIN — ZOLPIDEM TARTRATE 10 MG: 5 TABLET ORAL at 21:45

## 2020-01-01 RX ADMIN — SODIUM CHLORIDE 25 ML/HR: 900 INJECTION, SOLUTION INTRAVENOUS at 15:47

## 2020-01-01 RX ADMIN — PREDNISOLONE ACETATE 2 DROP: 10 SUSPENSION/ DROPS OPHTHALMIC at 18:12

## 2020-01-01 RX ADMIN — PREDNISOLONE ACETATE 2 DROP: 10 SUSPENSION/ DROPS OPHTHALMIC at 05:46

## 2020-01-01 RX ADMIN — HEPARIN 300 UNITS: 100 SYRINGE at 09:42

## 2020-01-01 RX ADMIN — PRAVASTATIN SODIUM 10 MG: 20 TABLET ORAL at 22:01

## 2020-01-01 RX ADMIN — Medication 10 ML: at 15:44

## 2020-01-01 RX ADMIN — VORICONAZOLE 200 MG: 200 TABLET, FILM COATED ORAL at 08:20

## 2020-01-01 RX ADMIN — DIPHENHYDRAMINE HYDROCHLORIDE 25 MG: 25 CAPSULE ORAL at 15:16

## 2020-01-01 RX ADMIN — LIDOCAINE HYDROCHLORIDE 200 MG: 20 INJECTION, SOLUTION INFILTRATION; PERINEURAL at 08:16

## 2020-01-01 RX ADMIN — CEFEPIME HYDROCHLORIDE 2 G: 2 INJECTION, POWDER, FOR SOLUTION INTRAVENOUS at 13:55

## 2020-01-01 RX ADMIN — VORICONAZOLE 200 MG: 200 TABLET, FILM COATED ORAL at 09:11

## 2020-01-01 RX ADMIN — ZOLPIDEM TARTRATE 10 MG: 5 TABLET ORAL at 21:20

## 2020-01-01 RX ADMIN — PREDNISOLONE ACETATE 2 DROP: 10 SUSPENSION/ DROPS OPHTHALMIC at 17:59

## 2020-01-01 RX ADMIN — SODIUM CHLORIDE 250 ML: 900 INJECTION, SOLUTION INTRAVENOUS at 13:10

## 2020-01-01 RX ADMIN — SODIUM CHLORIDE 500 ML: 900 INJECTION, SOLUTION INTRAVENOUS at 07:45

## 2020-01-01 RX ADMIN — LORAZEPAM 1 MG: 1 TABLET ORAL at 22:29

## 2020-01-01 RX ADMIN — ACYCLOVIR 400 MG: 800 TABLET ORAL at 18:17

## 2020-01-01 RX ADMIN — PRAVASTATIN SODIUM 10 MG: 20 TABLET ORAL at 21:24

## 2020-01-01 RX ADMIN — PREDNISOLONE ACETATE 2 DROP: 10 SUSPENSION/ DROPS OPHTHALMIC at 06:01

## 2020-01-01 RX ADMIN — HEPARIN 300 UNITS: 100 SYRINGE at 10:44

## 2020-01-01 RX ADMIN — PREDNISOLONE ACETATE 2 DROP: 10 SUSPENSION/ DROPS OPHTHALMIC at 18:33

## 2020-01-01 RX ADMIN — DIPHENHYDRAMINE HYDROCHLORIDE 25 MG: 25 CAPSULE ORAL at 14:24

## 2020-01-01 RX ADMIN — DIPHENHYDRAMINE HYDROCHLORIDE 25 MG: 25 CAPSULE ORAL at 00:20

## 2020-01-01 RX ADMIN — PRAVASTATIN SODIUM 10 MG: 20 TABLET ORAL at 22:00

## 2020-01-01 RX ADMIN — PREDNISOLONE ACETATE 2 DROP: 10 SUSPENSION/ DROPS OPHTHALMIC at 00:46

## 2020-01-01 RX ADMIN — Medication 10 ML: at 20:19

## 2020-01-01 RX ADMIN — ONDANSETRON 8 MG: 2 INJECTION INTRAMUSCULAR; INTRAVENOUS at 14:06

## 2020-01-01 RX ADMIN — Medication 10 ML: at 14:00

## 2020-01-01 RX ADMIN — Medication 10 ML: at 08:20

## 2020-01-01 RX ADMIN — SODIUM CHLORIDE 25 ML/HR: 900 INJECTION, SOLUTION INTRAVENOUS at 10:05

## 2020-01-01 RX ADMIN — PREDNISOLONE ACETATE 2 DROP: 10 SUSPENSION/ DROPS OPHTHALMIC at 05:52

## 2020-01-01 RX ADMIN — ACYCLOVIR 400 MG: 800 TABLET ORAL at 07:58

## 2020-01-01 RX ADMIN — FENTANYL CITRATE 50 MCG: 50 INJECTION, SOLUTION INTRAMUSCULAR; INTRAVENOUS at 08:14

## 2020-01-01 RX ADMIN — TBO-FILGRASTIM 480 MCG: 480 INJECTION, SOLUTION SUBCUTANEOUS at 22:18

## 2020-01-01 RX ADMIN — ZOLPIDEM TARTRATE 10 MG: 5 TABLET ORAL at 22:07

## 2020-01-01 RX ADMIN — PREDNISOLONE ACETATE 2 DROP: 10 SUSPENSION/ DROPS OPHTHALMIC at 23:16

## 2020-01-01 RX ADMIN — POTASSIUM CHLORIDE 20 MEQ: 1500 TABLET, EXTENDED RELEASE ORAL at 08:29

## 2020-01-01 RX ADMIN — ZOLPIDEM TARTRATE 10 MG: 5 TABLET ORAL at 21:35

## 2020-01-01 RX ADMIN — ACETAMINOPHEN 650 MG: 325 TABLET, FILM COATED ORAL at 03:21

## 2020-01-01 RX ADMIN — PREDNISOLONE ACETATE 2 DROP: 10 SUSPENSION/ DROPS OPHTHALMIC at 23:40

## 2020-01-01 RX ADMIN — ACYCLOVIR 400 MG: 800 TABLET ORAL at 09:11

## 2020-01-01 RX ADMIN — SODIUM CHLORIDE 250 ML: 900 INJECTION, SOLUTION INTRAVENOUS at 10:50

## 2020-01-01 RX ADMIN — AZACITIDINE 92 MG: 100 INJECTION, POWDER, LYOPHILIZED, FOR SOLUTION INTRAVENOUS; SUBCUTANEOUS at 11:40

## 2020-01-01 RX ADMIN — ALTEPLASE 1 MG: 2.2 INJECTION, POWDER, LYOPHILIZED, FOR SOLUTION INTRAVENOUS at 05:44

## 2020-01-01 RX ADMIN — CYTARABINE 2895 MG: 100 INJECTION, SOLUTION INTRATHECAL; INTRAVENOUS; SUBCUTANEOUS at 17:59

## 2020-01-01 RX ADMIN — HEPARIN SODIUM (PORCINE) LOCK FLUSH IV SOLN 100 UNIT/ML 500 UNITS: 100 SOLUTION at 15:14

## 2020-01-01 RX ADMIN — SODIUM CHLORIDE 25 ML/HR: 900 INJECTION, SOLUTION INTRAVENOUS at 10:57

## 2020-01-01 RX ADMIN — DEXAMETHASONE SODIUM PHOSPHATE 4 MG: 10 INJECTION INTRAMUSCULAR; INTRAVENOUS at 02:26

## 2020-01-01 RX ADMIN — SODIUM CHLORIDE 250 ML: 900 INJECTION, SOLUTION INTRAVENOUS at 08:32

## 2020-01-01 RX ADMIN — DIPHENHYDRAMINE HYDROCHLORIDE 25 MG: 25 CAPSULE ORAL at 10:54

## 2020-01-01 RX ADMIN — SODIUM CHLORIDE 25 ML/HR: 9 INJECTION, SOLUTION INTRAVENOUS at 11:06

## 2020-01-01 RX ADMIN — ALTEPLASE 4 MG: 2.2 INJECTION, POWDER, LYOPHILIZED, FOR SOLUTION INTRAVENOUS at 09:00

## 2020-01-01 RX ADMIN — CYTARABINE 2895 MG: 2 INJECTION INTRATHECAL; INTRAVENOUS; SUBCUTANEOUS at 03:09

## 2020-01-01 RX ADMIN — SODIUM CHLORIDE 10 ML: 9 INJECTION INTRAMUSCULAR; INTRAVENOUS; SUBCUTANEOUS at 12:16

## 2020-01-01 RX ADMIN — Medication 10 ML: at 09:00

## 2020-01-01 RX ADMIN — Medication 10 ML: at 15:10

## 2020-01-01 RX ADMIN — SODIUM CHLORIDE 25 ML/HR: 900 INJECTION, SOLUTION INTRAVENOUS at 15:10

## 2020-01-01 RX ADMIN — ZOLPIDEM TARTRATE 10 MG: 5 TABLET ORAL at 22:22

## 2020-01-01 RX ADMIN — VORICONAZOLE 200 MG: 200 TABLET, FILM COATED ORAL at 08:11

## 2020-01-01 RX ADMIN — VORICONAZOLE 200 MG: 200 TABLET, FILM COATED ORAL at 20:35

## 2020-01-01 RX ADMIN — POTASSIUM CHLORIDE 20 MEQ: 20 TABLET, EXTENDED RELEASE ORAL at 17:45

## 2020-01-01 RX ADMIN — PREDNISOLONE ACETATE 2 DROP: 10 SUSPENSION/ DROPS OPHTHALMIC at 17:02

## 2020-01-01 RX ADMIN — LORAZEPAM 1 MG: 1 TABLET ORAL at 22:24

## 2020-01-01 RX ADMIN — Medication 10 ML: at 11:05

## 2020-01-01 RX ADMIN — Medication 20 ML: at 10:50

## 2020-01-01 RX ADMIN — CHOLECALCIFEROL (VITAMIN D3) 10 MCG (400 UNIT) TABLET 2 TABLET: at 08:58

## 2020-01-01 RX ADMIN — LEVOFLOXACIN 500 MG: 500 TABLET, FILM COATED ORAL at 09:58

## 2020-01-01 RX ADMIN — Medication 10 ML: at 08:51

## 2020-01-01 RX ADMIN — ACETAMINOPHEN 650 MG: 325 TABLET, FILM COATED ORAL at 10:52

## 2020-01-01 RX ADMIN — Medication 10 ML: at 10:50

## 2020-01-01 RX ADMIN — PREDNISOLONE ACETATE 2 DROP: 10 SUSPENSION/ DROPS OPHTHALMIC at 18:17

## 2020-01-01 RX ADMIN — ACETAMINOPHEN 650 MG: 325 TABLET, FILM COATED ORAL at 15:16

## 2020-01-01 RX ADMIN — VORICONAZOLE 200 MG: 200 TABLET, FILM COATED ORAL at 07:47

## 2020-01-01 RX ADMIN — Medication 100 ML: at 09:50

## 2020-01-01 RX ADMIN — CYTARABINE 2895 MG: 100 INJECTION, SOLUTION INTRATHECAL; INTRAVENOUS; SUBCUTANEOUS at 06:31

## 2020-01-01 RX ADMIN — DIPHENHYDRAMINE HYDROCHLORIDE 25 MG: 25 CAPSULE ORAL at 11:07

## 2020-01-01 RX ADMIN — Medication 300 UNITS: at 13:50

## 2020-01-01 RX ADMIN — ACYCLOVIR 400 MG: 800 TABLET ORAL at 08:11

## 2020-01-01 RX ADMIN — Medication 2 TABLET: at 08:11

## 2020-01-01 RX ADMIN — Medication 10 ML: at 07:20

## 2020-01-01 RX ADMIN — PANTOPRAZOLE SODIUM 40 MG: 40 TABLET, DELAYED RELEASE ORAL at 17:27

## 2020-01-01 RX ADMIN — Medication 400 MG: at 08:34

## 2020-01-01 RX ADMIN — ONDANSETRON 8 MG: 2 INJECTION INTRAMUSCULAR; INTRAVENOUS at 10:25

## 2020-01-01 RX ADMIN — SODIUM CHLORIDE 75 ML/HR: 900 INJECTION, SOLUTION INTRAVENOUS at 21:21

## 2020-01-01 RX ADMIN — DIPHENHYDRAMINE HYDROCHLORIDE 25 MG: 25 CAPSULE ORAL at 11:45

## 2020-01-01 RX ADMIN — PRAVASTATIN SODIUM 10 MG: 20 TABLET ORAL at 21:19

## 2020-01-01 RX ADMIN — Medication 10 ML: at 10:40

## 2020-01-01 RX ADMIN — PREDNISOLONE ACETATE 2 DROP: 10 SUSPENSION/ DROPS OPHTHALMIC at 13:15

## 2020-01-01 RX ADMIN — ACYCLOVIR 400 MG: 800 TABLET ORAL at 18:07

## 2020-01-01 RX ADMIN — TBO-FILGRASTIM 480 MCG: 480 INJECTION, SOLUTION SUBCUTANEOUS at 21:37

## 2020-01-01 RX ADMIN — DULOXETINE HYDROCHLORIDE 30 MG: 30 CAPSULE, DELAYED RELEASE ORAL at 08:34

## 2020-01-01 RX ADMIN — Medication 20 ML: at 10:05

## 2020-01-01 RX ADMIN — ACYCLOVIR 400 MG: 800 TABLET ORAL at 17:27

## 2020-01-01 RX ADMIN — VORICONAZOLE 200 MG: 200 TABLET, FILM COATED ORAL at 22:00

## 2020-01-01 RX ADMIN — VORICONAZOLE 200 MG: 200 TABLET, FILM COATED ORAL at 09:29

## 2020-01-01 RX ADMIN — PREDNISOLONE ACETATE 2 DROP: 10 SUSPENSION/ DROPS OPHTHALMIC at 11:27

## 2020-01-01 RX ADMIN — ONDANSETRON 8 MG: 2 INJECTION INTRAMUSCULAR; INTRAVENOUS at 17:28

## 2020-01-01 RX ADMIN — DULOXETINE HYDROCHLORIDE 30 MG: 30 CAPSULE, DELAYED RELEASE ORAL at 08:58

## 2020-01-01 RX ADMIN — AZACITIDINE 91 MG: 100 INJECTION, POWDER, LYOPHILIZED, FOR SOLUTION INTRAVENOUS; SUBCUTANEOUS at 10:59

## 2020-01-01 RX ADMIN — Medication 2 TABLET: at 08:26

## 2020-01-01 RX ADMIN — LIDOCAINE HYDROCHLORIDE 200 MG: 20 INJECTION, SOLUTION INFILTRATION; PERINEURAL at 12:46

## 2020-01-01 RX ADMIN — TBO-FILGRASTIM 300 MCG: 300 INJECTION, SOLUTION SUBCUTANEOUS at 11:26

## 2020-01-01 RX ADMIN — ACYCLOVIR 400 MG: 800 TABLET ORAL at 08:34

## 2020-01-01 RX ADMIN — ONDANSETRON 8 MG: 2 INJECTION INTRAMUSCULAR; INTRAVENOUS at 02:29

## 2020-01-01 RX ADMIN — VORICONAZOLE 200 MG: 200 TABLET, FILM COATED ORAL at 21:36

## 2020-01-01 RX ADMIN — DULOXETINE HYDROCHLORIDE 30 MG: 30 CAPSULE, DELAYED RELEASE ORAL at 08:56

## 2020-01-01 RX ADMIN — ZOLPIDEM TARTRATE 10 MG: 5 TABLET ORAL at 21:47

## 2020-01-01 RX ADMIN — PREDNISOLONE ACETATE 2 DROP: 10 SUSPENSION/ DROPS OPHTHALMIC at 12:00

## 2020-01-01 RX ADMIN — Medication 10 ML: at 10:03

## 2020-01-01 RX ADMIN — CEFEPIME HYDROCHLORIDE 2 G: 2 INJECTION, POWDER, FOR SOLUTION INTRAVENOUS at 15:19

## 2020-01-01 RX ADMIN — DULOXETINE 30 MG: 30 CAPSULE, DELAYED RELEASE ORAL at 08:11

## 2020-01-01 RX ADMIN — ACYCLOVIR 400 MG: 800 TABLET ORAL at 08:48

## 2020-01-01 RX ADMIN — PRAVASTATIN SODIUM 10 MG: 20 TABLET ORAL at 22:09

## 2020-01-01 RX ADMIN — CYTARABINE 2895 MG: 100 INJECTION, SOLUTION INTRATHECAL; INTRAVENOUS; SUBCUTANEOUS at 06:42

## 2020-01-01 RX ADMIN — VORICONAZOLE 200 MG: 200 TABLET, FILM COATED ORAL at 07:53

## 2020-01-01 RX ADMIN — ACYCLOVIR 400 MG: 800 TABLET ORAL at 17:58

## 2020-01-01 RX ADMIN — Medication 500 UNITS: at 10:06

## 2020-01-01 RX ADMIN — Medication 10 ML: at 10:45

## 2020-01-01 RX ADMIN — POTASSIUM CHLORIDE 20 MEQ: 20 TABLET, EXTENDED RELEASE ORAL at 08:45

## 2020-01-01 RX ADMIN — VORICONAZOLE 200 MG: 200 TABLET, FILM COATED ORAL at 22:09

## 2020-01-01 RX ADMIN — AZACITIDINE 91 MG: 100 INJECTION, POWDER, LYOPHILIZED, FOR SOLUTION INTRAVENOUS; SUBCUTANEOUS at 10:37

## 2020-01-01 RX ADMIN — Medication 10 ML: at 15:45

## 2020-01-01 RX ADMIN — TBO-FILGRASTIM 480 MCG: 480 INJECTION, SOLUTION SUBCUTANEOUS at 22:08

## 2020-01-01 RX ADMIN — LORAZEPAM 1 MG: 1 TABLET ORAL at 21:47

## 2020-01-01 RX ADMIN — VORICONAZOLE 200 MG: 200 TABLET, FILM COATED ORAL at 08:07

## 2020-01-01 RX ADMIN — ACETAMINOPHEN 650 MG: 325 TABLET, FILM COATED ORAL at 16:08

## 2020-01-01 RX ADMIN — GABAPENTIN 200 MG: 100 CAPSULE ORAL at 22:24

## 2020-01-01 RX ADMIN — SODIUM CHLORIDE 75 ML/HR: 900 INJECTION, SOLUTION INTRAVENOUS at 01:28

## 2020-01-01 RX ADMIN — SODIUM CHLORIDE 75 ML/HR: 900 INJECTION, SOLUTION INTRAVENOUS at 14:05

## 2020-01-01 RX ADMIN — ZOLPIDEM TARTRATE 10 MG: 5 TABLET ORAL at 21:36

## 2020-01-01 RX ADMIN — PRAVASTATIN SODIUM 10 MG: 20 TABLET ORAL at 21:34

## 2020-01-01 RX ADMIN — PREDNISOLONE ACETATE 2 DROP: 10 SUSPENSION/ DROPS OPHTHALMIC at 23:18

## 2020-01-01 RX ADMIN — Medication 10 ML: at 12:58

## 2020-01-01 RX ADMIN — AZACITIDINE 151 MG: 100 INJECTION, POWDER, LYOPHILIZED, FOR SOLUTION INTRAVENOUS; SUBCUTANEOUS at 14:21

## 2020-01-01 RX ADMIN — Medication 10 ML: at 12:20

## 2020-01-01 RX ADMIN — ENOXAPARIN SODIUM 40 MG: 40 INJECTION SUBCUTANEOUS at 09:42

## 2020-01-01 RX ADMIN — PRAVASTATIN SODIUM 10 MG: 20 TABLET ORAL at 22:05

## 2020-01-01 RX ADMIN — SODIUM CHLORIDE 75 ML/HR: 900 INJECTION, SOLUTION INTRAVENOUS at 12:03

## 2020-01-01 RX ADMIN — DEXAMETHASONE SODIUM PHOSPHATE 8 MG: 4 INJECTION, SOLUTION INTRAMUSCULAR; INTRAVENOUS at 15:49

## 2020-01-01 RX ADMIN — ALTEPLASE 1 MG: 2.2 INJECTION, POWDER, LYOPHILIZED, FOR SOLUTION INTRAVENOUS at 22:21

## 2020-01-01 RX ADMIN — PREDNISOLONE ACETATE 2 DROP: 10 SUSPENSION/ DROPS OPHTHALMIC at 08:57

## 2020-01-01 RX ADMIN — PRAVASTATIN SODIUM 10 MG: 20 TABLET ORAL at 21:57

## 2020-01-01 RX ADMIN — ACETAMINOPHEN: 500 TABLET, FILM COATED ORAL at 21:19

## 2020-01-01 RX ADMIN — CHOLECALCIFEROL (VITAMIN D3) 10 MCG (400 UNIT) TABLET 2 TABLET: at 07:58

## 2020-01-01 RX ADMIN — SODIUM CHLORIDE 25 ML/HR: 900 INJECTION, SOLUTION INTRAVENOUS at 12:07

## 2020-01-01 RX ADMIN — PREDNISOLONE ACETATE 2 DROP: 10 SUSPENSION/ DROPS OPHTHALMIC at 11:36

## 2020-01-01 RX ADMIN — MIDAZOLAM 1 MG: 1 INJECTION INTRAMUSCULAR; INTRAVENOUS at 12:47

## 2020-01-01 RX ADMIN — SODIUM CHLORIDE 25 ML/HR: 900 INJECTION, SOLUTION INTRAVENOUS at 15:30

## 2020-01-01 RX ADMIN — ONDANSETRON 8 MG: 2 INJECTION INTRAMUSCULAR; INTRAVENOUS at 05:04

## 2020-01-01 RX ADMIN — PRAVASTATIN SODIUM 10 MG: 20 TABLET ORAL at 21:20

## 2020-01-01 RX ADMIN — LEVOFLOXACIN 500 MG: 500 TABLET, FILM COATED ORAL at 14:33

## 2020-01-01 RX ADMIN — DULOXETINE HYDROCHLORIDE 30 MG: 30 CAPSULE, DELAYED RELEASE ORAL at 08:46

## 2020-01-01 RX ADMIN — HYDROCORTISONE: 1 CREAM TOPICAL at 08:35

## 2020-01-01 RX ADMIN — SODIUM CHLORIDE 50 ML/HR: 900 INJECTION, SOLUTION INTRAVENOUS at 21:38

## 2020-01-01 RX ADMIN — Medication 10 ML: at 14:33

## 2020-01-01 RX ADMIN — PREDNISOLONE ACETATE 2 DROP: 10 SUSPENSION/ DROPS OPHTHALMIC at 12:01

## 2020-01-01 RX ADMIN — ACETAMINOPHEN 650 MG: 325 TABLET, FILM COATED ORAL at 12:04

## 2020-01-01 RX ADMIN — LORAZEPAM 1 MG: 1 TABLET ORAL at 21:45

## 2020-01-01 RX ADMIN — ACETAMINOPHEN 650 MG: 325 TABLET, FILM COATED ORAL at 10:39

## 2020-01-01 RX ADMIN — DEXAMETHASONE SODIUM PHOSPHATE 10 MG: 10 INJECTION INTRAMUSCULAR; INTRAVENOUS at 14:04

## 2020-01-01 RX ADMIN — DEXAMETHASONE SODIUM PHOSPHATE 8 MG: 4 INJECTION, SOLUTION INTRAMUSCULAR; INTRAVENOUS at 16:05

## 2020-01-01 RX ADMIN — ONDANSETRON 8 MG: 2 INJECTION INTRAMUSCULAR; INTRAVENOUS at 14:14

## 2020-01-01 RX ADMIN — TBO-FILGRASTIM 480 MCG: 480 INJECTION, SOLUTION SUBCUTANEOUS at 22:07

## 2020-01-01 RX ADMIN — CEFEPIME HYDROCHLORIDE 2 G: 2 INJECTION, POWDER, FOR SOLUTION INTRAVENOUS at 22:15

## 2020-01-01 RX ADMIN — PREDNISOLONE ACETATE 2 DROP: 10 SUSPENSION/ DROPS OPHTHALMIC at 23:17

## 2020-01-01 RX ADMIN — SODIUM CHLORIDE 10 ML: 9 INJECTION INTRAMUSCULAR; INTRAVENOUS; SUBCUTANEOUS at 11:20

## 2020-01-01 RX ADMIN — Medication 10 ML: at 12:41

## 2020-01-01 RX ADMIN — VORICONAZOLE 200 MG: 200 TABLET, FILM COATED ORAL at 21:41

## 2020-01-01 RX ADMIN — POTASSIUM CHLORIDE 20 MEQ: 20 TABLET, EXTENDED RELEASE ORAL at 08:56

## 2020-01-01 RX ADMIN — Medication 10 ML: at 14:56

## 2020-01-01 RX ADMIN — Medication 10 ML: at 21:24

## 2020-01-01 RX ADMIN — PREDNISOLONE ACETATE 2 DROP: 10 SUSPENSION/ DROPS OPHTHALMIC at 12:36

## 2020-01-01 RX ADMIN — ONDANSETRON 8 MG: 2 INJECTION INTRAMUSCULAR; INTRAVENOUS at 04:09

## 2020-01-01 RX ADMIN — LEVOFLOXACIN 500 MG: 500 TABLET, FILM COATED ORAL at 20:18

## 2020-01-01 RX ADMIN — MIDAZOLAM 0.5 MG: 1 INJECTION INTRAMUSCULAR; INTRAVENOUS at 08:23

## 2020-01-01 RX ADMIN — AZACITIDINE 92 MG: 100 INJECTION, POWDER, LYOPHILIZED, FOR SOLUTION INTRAVENOUS; SUBCUTANEOUS at 15:50

## 2020-01-01 RX ADMIN — MIDAZOLAM 1 MG: 1 INJECTION INTRAMUSCULAR; INTRAVENOUS at 14:30

## 2020-01-01 RX ADMIN — VORICONAZOLE 200 MG: 200 TABLET, FILM COATED ORAL at 21:35

## 2020-01-01 RX ADMIN — SODIUM CHLORIDE 250 ML: 900 INJECTION, SOLUTION INTRAVENOUS at 14:32

## 2020-01-01 RX ADMIN — SODIUM CHLORIDE 25 ML/HR: 900 INJECTION, SOLUTION INTRAVENOUS at 11:30

## 2020-01-01 RX ADMIN — POTASSIUM CHLORIDE 20 MEQ: 20 TABLET, EXTENDED RELEASE ORAL at 08:11

## 2020-01-01 RX ADMIN — SODIUM CHLORIDE 75 ML/HR: 900 INJECTION, SOLUTION INTRAVENOUS at 22:25

## 2020-01-01 RX ADMIN — CEFEPIME HYDROCHLORIDE 2 G: 2 INJECTION, POWDER, FOR SOLUTION INTRAVENOUS at 03:05

## 2020-01-01 RX ADMIN — AZACITIDINE 91 MG: 100 INJECTION, POWDER, LYOPHILIZED, FOR SOLUTION INTRAVENOUS; SUBCUTANEOUS at 11:06

## 2020-01-01 RX ADMIN — DEXAMETHASONE SODIUM PHOSPHATE 8 MG: 4 INJECTION, SOLUTION INTRAMUSCULAR; INTRAVENOUS at 14:17

## 2020-01-01 RX ADMIN — SODIUM CHLORIDE 75 ML/HR: 900 INJECTION, SOLUTION INTRAVENOUS at 11:22

## 2020-01-01 RX ADMIN — DULOXETINE 30 MG: 30 CAPSULE, DELAYED RELEASE ORAL at 08:42

## 2020-01-01 RX ADMIN — ONDANSETRON 8 MG: 2 INJECTION INTRAMUSCULAR; INTRAVENOUS at 10:02

## 2020-01-01 RX ADMIN — Medication 10 ML: at 15:22

## 2020-01-01 RX ADMIN — SODIUM CHLORIDE 75 ML/HR: 900 INJECTION, SOLUTION INTRAVENOUS at 02:37

## 2020-01-01 RX ADMIN — POTASSIUM CHLORIDE 20 MEQ: 20 TABLET, EXTENDED RELEASE ORAL at 17:27

## 2020-01-01 RX ADMIN — AZACITIDINE 92 MG: 100 INJECTION, POWDER, LYOPHILIZED, FOR SOLUTION INTRAVENOUS; SUBCUTANEOUS at 11:57

## 2020-01-01 RX ADMIN — SODIUM CHLORIDE 250 ML: 900 INJECTION, SOLUTION INTRAVENOUS at 11:59

## 2020-01-01 RX ADMIN — FENTANYL CITRATE 50 MCG: 50 INJECTION, SOLUTION INTRAMUSCULAR; INTRAVENOUS at 14:30

## 2020-01-01 RX ADMIN — VORICONAZOLE 200 MG: 200 TABLET, FILM COATED ORAL at 08:47

## 2020-01-01 RX ADMIN — Medication 10 ML: at 16:24

## 2020-01-01 RX ADMIN — Medication 2 TABLET: at 08:34

## 2020-01-01 RX ADMIN — ACYCLOVIR 400 MG: 800 TABLET ORAL at 21:40

## 2020-01-01 RX ADMIN — ACYCLOVIR 400 MG: 800 TABLET ORAL at 18:20

## 2020-01-01 RX ADMIN — ONDANSETRON 8 MG: 2 INJECTION INTRAMUSCULAR; INTRAVENOUS at 16:01

## 2020-01-01 RX ADMIN — DEXAMETHASONE SODIUM PHOSPHATE 4 MG: 4 INJECTION, SOLUTION INTRAMUSCULAR; INTRAVENOUS at 02:24

## 2020-01-01 RX ADMIN — ONDANSETRON 8 MG: 2 INJECTION INTRAMUSCULAR; INTRAVENOUS at 14:46

## 2020-01-01 RX ADMIN — LIDOCAINE HYDROCHLORIDE 100 MG: 20 INJECTION, SOLUTION INFILTRATION; PERINEURAL at 14:24

## 2020-01-01 RX ADMIN — Medication 20 ML: at 10:25

## 2020-01-01 RX ADMIN — ACYCLOVIR 400 MG: 800 TABLET ORAL at 17:53

## 2020-01-01 RX ADMIN — VANCOMYCIN HYDROCHLORIDE 1000 MG: 1 INJECTION, POWDER, LYOPHILIZED, FOR SOLUTION INTRAVENOUS at 00:59

## 2020-01-01 RX ADMIN — GABAPENTIN 200 MG: 100 CAPSULE ORAL at 22:00

## 2020-01-01 RX ADMIN — DEXAMETHASONE SODIUM PHOSPHATE 8 MG: 4 INJECTION, SOLUTION INTRAMUSCULAR; INTRAVENOUS at 11:23

## 2020-01-01 RX ADMIN — POTASSIUM CHLORIDE 20 MEQ: 1500 TABLET, EXTENDED RELEASE ORAL at 09:29

## 2020-01-01 RX ADMIN — VORICONAZOLE 200 MG: 200 TABLET, FILM COATED ORAL at 20:26

## 2020-01-01 RX ADMIN — DIPHENHYDRAMINE HYDROCHLORIDE 25 MG: 25 CAPSULE ORAL at 05:21

## 2020-01-01 RX ADMIN — ACYCLOVIR 400 MG: 800 TABLET ORAL at 17:20

## 2020-01-01 RX ADMIN — DULOXETINE 30 MG: 30 CAPSULE, DELAYED RELEASE ORAL at 09:02

## 2020-01-01 RX ADMIN — DEXAMETHASONE SODIUM PHOSPHATE 4 MG: 4 INJECTION, SOLUTION INTRAMUSCULAR; INTRAVENOUS at 02:29

## 2020-01-01 RX ADMIN — DIPHENHYDRAMINE HYDROCHLORIDE 25 MG: 25 CAPSULE ORAL at 10:40

## 2020-01-01 RX ADMIN — PREDNISOLONE ACETATE 2 DROP: 10 SUSPENSION/ DROPS OPHTHALMIC at 22:05

## 2020-01-01 RX ADMIN — PREDNISOLONE ACETATE 2 DROP: 10 SUSPENSION/ DROPS OPHTHALMIC at 05:02

## 2020-01-01 RX ADMIN — PREDNISOLONE ACETATE 2 DROP: 10 SUSPENSION/ DROPS OPHTHALMIC at 06:37

## 2020-01-01 RX ADMIN — ACETAMINOPHEN 650 MG: 325 TABLET, FILM COATED ORAL at 10:54

## 2020-01-01 RX ADMIN — FENTANYL CITRATE 50 MCG: 50 INJECTION, SOLUTION INTRAMUSCULAR; INTRAVENOUS at 12:37

## 2020-01-01 RX ADMIN — DEXAMETHASONE SODIUM PHOSPHATE 4 MG: 4 INJECTION, SOLUTION INTRAMUSCULAR; INTRAVENOUS at 04:08

## 2020-01-01 RX ADMIN — PREDNISOLONE ACETATE 2 DROP: 10 SUSPENSION/ DROPS OPHTHALMIC at 06:13

## 2020-01-01 RX ADMIN — DEXAMETHASONE SODIUM PHOSPHATE 4 MG: 4 INJECTION, SOLUTION INTRAMUSCULAR; INTRAVENOUS at 14:26

## 2020-01-01 RX ADMIN — PANTOPRAZOLE SODIUM 40 MG: 40 TABLET, DELAYED RELEASE ORAL at 08:52

## 2020-01-01 RX ADMIN — ONDANSETRON 8 MG: 2 INJECTION INTRAMUSCULAR; INTRAVENOUS at 01:28

## 2020-01-01 RX ADMIN — ZOLPIDEM TARTRATE 10 MG: 5 TABLET ORAL at 22:00

## 2020-01-01 RX ADMIN — ACYCLOVIR 400 MG: 800 TABLET ORAL at 09:02

## 2020-01-01 RX ADMIN — CEFEPIME HYDROCHLORIDE 2 G: 2 INJECTION, POWDER, FOR SOLUTION INTRAVENOUS at 05:25

## 2020-01-01 RX ADMIN — Medication 2 TABLET: at 08:57

## 2020-01-01 RX ADMIN — ACETAMINOPHEN 650 MG: 325 TABLET, FILM COATED ORAL at 11:08

## 2020-01-01 RX ADMIN — Medication 10 ML: at 13:00

## 2020-01-01 RX ADMIN — ACYCLOVIR 400 MG: 800 TABLET ORAL at 17:01

## 2020-01-01 RX ADMIN — PREDNISOLONE ACETATE 2 DROP: 10 SUSPENSION/ DROPS OPHTHALMIC at 03:05

## 2020-01-01 RX ADMIN — SODIUM CHLORIDE 250 ML: 900 INJECTION, SOLUTION INTRAVENOUS at 16:00

## 2020-01-01 RX ADMIN — Medication 10 ML: at 12:25

## 2020-01-01 RX ADMIN — Medication 10 ML: at 16:10

## 2020-01-01 RX ADMIN — Medication 10 ML: at 09:49

## 2020-01-01 RX ADMIN — PANTOPRAZOLE SODIUM 40 MG: 40 TABLET, DELAYED RELEASE ORAL at 09:56

## 2020-01-01 RX ADMIN — SODIUM CHLORIDE 10 ML: 9 INJECTION INTRAMUSCULAR; INTRAVENOUS; SUBCUTANEOUS at 11:05

## 2020-01-01 RX ADMIN — CYTARABINE 2895 MG: 100 INJECTION, SOLUTION INTRATHECAL; INTRAVENOUS; SUBCUTANEOUS at 06:40

## 2020-01-01 RX ADMIN — ALTEPLASE 4 MG: 2.2 INJECTION, POWDER, LYOPHILIZED, FOR SOLUTION INTRAVENOUS at 10:14

## 2020-01-01 RX ADMIN — ACYCLOVIR 400 MG: 800 TABLET ORAL at 18:03

## 2020-01-01 RX ADMIN — SODIUM CHLORIDE 75 ML/HR: 900 INJECTION, SOLUTION INTRAVENOUS at 11:24

## 2020-01-01 RX ADMIN — ACYCLOVIR 400 MG: 800 TABLET ORAL at 08:26

## 2020-01-01 RX ADMIN — ALTEPLASE 2 MG: 2.2 INJECTION, POWDER, LYOPHILIZED, FOR SOLUTION INTRAVENOUS at 10:19

## 2020-01-01 RX ADMIN — PANTOPRAZOLE SODIUM 40 MG: 40 TABLET, DELAYED RELEASE ORAL at 16:59

## 2020-01-01 RX ADMIN — Medication 20 ML: at 09:00

## 2020-01-01 RX ADMIN — AZACITIDINE 93 MG: 100 INJECTION, POWDER, LYOPHILIZED, FOR SOLUTION INTRAVENOUS; SUBCUTANEOUS at 11:00

## 2020-01-01 RX ADMIN — VANCOMYCIN HYDROCHLORIDE 1000 MG: 1 INJECTION, POWDER, LYOPHILIZED, FOR SOLUTION INTRAVENOUS at 13:30

## 2020-01-01 RX ADMIN — DEXAMETHASONE SODIUM PHOSPHATE 8 MG: 4 INJECTION, SOLUTION INTRAMUSCULAR; INTRAVENOUS at 16:12

## 2020-01-01 RX ADMIN — POLYETHYLENE GLYCOL 3350 17 G: 17 POWDER, FOR SOLUTION ORAL at 14:45

## 2020-01-01 RX ADMIN — DIPHENHYDRAMINE HYDROCHLORIDE 25 MG: 25 CAPSULE ORAL at 08:31

## 2020-01-01 RX ADMIN — VORICONAZOLE 200 MG: 200 TABLET, FILM COATED ORAL at 21:48

## 2020-01-01 RX ADMIN — DULOXETINE 30 MG: 30 CAPSULE, DELAYED RELEASE ORAL at 08:27

## 2020-01-01 RX ADMIN — CYTARABINE 2895 MG: 2 INJECTION INTRATHECAL; INTRAVENOUS; SUBCUTANEOUS at 15:42

## 2020-01-01 RX ADMIN — LEVOFLOXACIN 500 MG: 500 TABLET, FILM COATED ORAL at 11:01

## 2020-01-01 RX ADMIN — LORAZEPAM 1 MG: 1 TABLET ORAL at 21:35

## 2020-01-01 RX ADMIN — POTASSIUM CHLORIDE 20 MEQ: 1500 TABLET, EXTENDED RELEASE ORAL at 08:52

## 2020-01-01 RX ADMIN — DEXAMETHASONE SODIUM PHOSPHATE 8 MG: 4 INJECTION, SOLUTION INTRAMUSCULAR; INTRAVENOUS at 15:13

## 2020-01-01 RX ADMIN — POTASSIUM CHLORIDE 20 MEQ: 1500 TABLET, EXTENDED RELEASE ORAL at 08:34

## 2020-01-01 RX ADMIN — DULOXETINE 30 MG: 30 CAPSULE, DELAYED RELEASE ORAL at 08:45

## 2020-01-01 RX ADMIN — TBO-FILGRASTIM 480 MCG: 480 INJECTION, SOLUTION SUBCUTANEOUS at 21:48

## 2020-01-01 RX ADMIN — CHOLECALCIFEROL (VITAMIN D3) 10 MCG (400 UNIT) TABLET 2 TABLET: at 09:56

## 2020-01-01 RX ADMIN — PANTOPRAZOLE SODIUM 40 MG: 40 TABLET, DELAYED RELEASE ORAL at 15:13

## 2020-01-01 RX ADMIN — Medication 10 ML: at 08:30

## 2020-01-01 RX ADMIN — Medication 2 TABLET: at 07:53

## 2020-01-01 RX ADMIN — POTASSIUM CHLORIDE 20 MEQ: 1500 TABLET, EXTENDED RELEASE ORAL at 08:07

## 2020-01-01 RX ADMIN — Medication 10 ML: at 14:05

## 2020-01-01 RX ADMIN — Medication 10 ML: at 09:34

## 2020-01-01 RX ADMIN — ACYCLOVIR 400 MG: 800 TABLET ORAL at 17:39

## 2020-01-01 RX ADMIN — DULOXETINE HYDROCHLORIDE 30 MG: 30 CAPSULE, DELAYED RELEASE ORAL at 09:56

## 2020-01-01 RX ADMIN — ZOLPIDEM TARTRATE 10 MG: 5 TABLET ORAL at 22:18

## 2020-01-01 RX ADMIN — SODIUM CHLORIDE 75 ML/HR: 900 INJECTION, SOLUTION INTRAVENOUS at 00:35

## 2020-01-01 RX ADMIN — POTASSIUM CHLORIDE 20 MEQ: 20 TABLET, EXTENDED RELEASE ORAL at 09:02

## 2020-01-01 RX ADMIN — ZOLPIDEM TARTRATE 10 MG: 5 TABLET ORAL at 22:08

## 2020-01-01 RX ADMIN — ZOLPIDEM TARTRATE 10 MG: 5 TABLET ORAL at 22:29

## 2020-01-01 RX ADMIN — Medication 10 ML: at 08:50

## 2020-01-01 RX ADMIN — ENOXAPARIN SODIUM 40 MG: 40 INJECTION SUBCUTANEOUS at 09:28

## 2020-01-01 RX ADMIN — PANTOPRAZOLE SODIUM 40 MG: 40 TABLET, DELAYED RELEASE ORAL at 08:47

## 2020-01-01 RX ADMIN — Medication 500 UNITS: at 05:18

## 2020-01-01 RX ADMIN — LEVOFLOXACIN 500 MG: 500 TABLET, FILM COATED ORAL at 08:56

## 2020-01-01 RX ADMIN — Medication 10 ML: at 15:00

## 2020-01-01 RX ADMIN — ALTEPLASE 1 MG: KIT at 21:32

## 2020-01-01 RX ADMIN — ZOLPIDEM TARTRATE 10 MG: 5 TABLET ORAL at 21:41

## 2020-01-01 RX ADMIN — DULOXETINE 30 MG: 30 CAPSULE, DELAYED RELEASE ORAL at 09:11

## 2020-01-01 RX ADMIN — DIPHENHYDRAMINE HYDROCHLORIDE 25 MG: 25 CAPSULE ORAL at 12:42

## 2020-01-01 RX ADMIN — PRAVASTATIN SODIUM 10 MG: 20 TABLET ORAL at 22:29

## 2020-01-01 RX ADMIN — CYTARABINE 3900 MG: 2 INJECTION INTRATHECAL; INTRAVENOUS; SUBCUTANEOUS at 19:19

## 2020-01-01 RX ADMIN — VORICONAZOLE 200 MG: 200 TABLET, FILM COATED ORAL at 08:01

## 2020-01-01 RX ADMIN — ACYCLOVIR 400 MG: 800 TABLET ORAL at 08:56

## 2020-01-01 RX ADMIN — Medication 500 UNITS: at 15:08

## 2020-01-01 RX ADMIN — SODIUM CHLORIDE 25 ML/HR: 9 INJECTION, SOLUTION INTRAVENOUS at 11:15

## 2020-01-01 RX ADMIN — ACYCLOVIR 400 MG: 800 TABLET ORAL at 08:52

## 2020-01-01 RX ADMIN — VORICONAZOLE 200 MG: 200 TABLET, FILM COATED ORAL at 08:45

## 2020-01-01 RX ADMIN — GABAPENTIN 200 MG: 100 CAPSULE ORAL at 21:23

## 2020-01-01 RX ADMIN — SODIUM CHLORIDE 25 ML/HR: 900 INJECTION, SOLUTION INTRAVENOUS at 10:04

## 2020-01-01 RX ADMIN — SODIUM CHLORIDE 25 ML/HR: 9 INJECTION, SOLUTION INTRAVENOUS at 10:18

## 2020-01-01 RX ADMIN — PREDNISOLONE ACETATE 2 DROP: 10 SUSPENSION/ DROPS OPHTHALMIC at 11:06

## 2020-01-01 RX ADMIN — DEXAMETHASONE SODIUM PHOSPHATE 8 MG: 4 INJECTION, SOLUTION INTRAMUSCULAR; INTRAVENOUS at 11:37

## 2020-01-01 RX ADMIN — POTASSIUM CHLORIDE 20 MEQ: 1500 TABLET, EXTENDED RELEASE ORAL at 08:20

## 2020-01-01 RX ADMIN — ONDANSETRON 8 MG: 2 INJECTION INTRAMUSCULAR; INTRAVENOUS at 14:53

## 2020-01-01 RX ADMIN — PREDNISOLONE ACETATE 2 DROP: 10 SUSPENSION/ DROPS OPHTHALMIC at 02:25

## 2020-01-01 RX ADMIN — PREDNISOLONE ACETATE 2 DROP: 10 SUSPENSION/ DROPS OPHTHALMIC at 04:24

## 2020-01-01 RX ADMIN — DULOXETINE HYDROCHLORIDE 30 MG: 30 CAPSULE, DELAYED RELEASE ORAL at 08:07

## 2020-01-01 RX ADMIN — VORICONAZOLE 200 MG: 200 TABLET, FILM COATED ORAL at 20:18

## 2020-01-01 RX ADMIN — PANTOPRAZOLE SODIUM 40 MG: 40 TABLET, DELAYED RELEASE ORAL at 17:26

## 2020-01-01 RX ADMIN — ACYCLOVIR 400 MG: 800 TABLET ORAL at 17:16

## 2020-01-01 RX ADMIN — POTASSIUM CHLORIDE 20 MEQ: 20 TABLET, EXTENDED RELEASE ORAL at 16:28

## 2020-01-01 RX ADMIN — Medication 10 ML: at 09:40

## 2020-01-01 RX ADMIN — ONDANSETRON 8 MG: 2 INJECTION INTRAMUSCULAR; INTRAVENOUS at 11:20

## 2020-01-01 RX ADMIN — LEVOFLOXACIN 500 MG: 500 TABLET, FILM COATED ORAL at 11:33

## 2020-01-01 RX ADMIN — LORAZEPAM 1 MG: 1 TABLET ORAL at 21:19

## 2020-01-01 RX ADMIN — DEXAMETHASONE SODIUM PHOSPHATE 8 MG: 4 INJECTION, SOLUTION INTRAMUSCULAR; INTRAVENOUS at 10:21

## 2020-01-01 RX ADMIN — ACETAMINOPHEN 650 MG: 325 TABLET, FILM COATED ORAL at 15:23

## 2020-01-01 RX ADMIN — PREDNISOLONE ACETATE 2 DROP: 10 SUSPENSION/ DROPS OPHTHALMIC at 18:03

## 2020-01-01 RX ADMIN — Medication 10 ML: at 14:26

## 2020-01-01 RX ADMIN — DEXAMETHASONE SODIUM PHOSPHATE 8 MG: 4 INJECTION, SOLUTION INTRAMUSCULAR; INTRAVENOUS at 11:04

## 2020-01-01 RX ADMIN — HYDROCODONE BITARTRATE AND ACETAMINOPHEN 1 TABLET: 5; 325 TABLET ORAL at 20:25

## 2020-01-01 RX ADMIN — LORAZEPAM 1 MG: 1 TABLET ORAL at 21:23

## 2020-01-01 RX ADMIN — Medication 2 TABLET: at 10:06

## 2020-01-01 RX ADMIN — ACETAMINOPHEN 650 MG: 325 TABLET, FILM COATED ORAL at 12:46

## 2020-01-01 RX ADMIN — POTASSIUM CHLORIDE 20 MEQ: 1500 TABLET, EXTENDED RELEASE ORAL at 07:59

## 2020-01-01 RX ADMIN — DULOXETINE HYDROCHLORIDE 30 MG: 30 CAPSULE, DELAYED RELEASE ORAL at 08:49

## 2020-01-01 RX ADMIN — Medication 10 ML: at 21:12

## 2020-01-01 RX ADMIN — CEFEPIME HYDROCHLORIDE 2 G: 2 INJECTION, POWDER, FOR SOLUTION INTRAVENOUS at 14:11

## 2020-01-01 RX ADMIN — POTASSIUM CHLORIDE 20 MEQ: 20 TABLET, EXTENDED RELEASE ORAL at 16:42

## 2020-01-01 RX ADMIN — ZOLPIDEM TARTRATE 5 MG: 5 TABLET ORAL at 21:23

## 2020-01-01 RX ADMIN — HEPARIN 500 UNITS: 100 SYRINGE at 14:57

## 2020-01-01 RX ADMIN — VORICONAZOLE 200 MG: 200 TABLET, FILM COATED ORAL at 08:29

## 2020-01-01 RX ADMIN — SODIUM CHLORIDE 250 ML: 900 INJECTION, SOLUTION INTRAVENOUS at 14:24

## 2020-01-01 RX ADMIN — MIDAZOLAM 1 MG: 1 INJECTION INTRAMUSCULAR; INTRAVENOUS at 14:23

## 2020-01-01 RX ADMIN — ZOLPIDEM TARTRATE 10 MG: 5 TABLET ORAL at 21:53

## 2020-01-01 RX ADMIN — VORICONAZOLE 200 MG: 200 TABLET, FILM COATED ORAL at 22:15

## 2020-01-01 RX ADMIN — PREDNISOLONE ACETATE 2 DROP: 10 SUSPENSION/ DROPS OPHTHALMIC at 05:44

## 2020-01-01 RX ADMIN — DEXAMETHASONE SODIUM PHOSPHATE 8 MG: 4 INJECTION, SOLUTION INTRAMUSCULAR; INTRAVENOUS at 14:03

## 2020-01-01 RX ADMIN — Medication 10 ML: at 11:35

## 2020-01-01 RX ADMIN — Medication 10 ML: at 09:11

## 2020-01-01 RX ADMIN — ACYCLOVIR 400 MG: 800 TABLET ORAL at 17:04

## 2020-01-01 RX ADMIN — DEXAMETHASONE SODIUM PHOSPHATE 8 MG: 4 INJECTION, SOLUTION INTRAMUSCULAR; INTRAVENOUS at 10:46

## 2020-01-01 RX ADMIN — VORICONAZOLE 200 MG: 200 TABLET, FILM COATED ORAL at 09:56

## 2020-01-01 RX ADMIN — SODIUM CHLORIDE 25 ML/HR: 900 INJECTION, SOLUTION INTRAVENOUS at 15:22

## 2020-01-01 RX ADMIN — CYTARABINE 2895 MG: 2 INJECTION INTRATHECAL; INTRAVENOUS; SUBCUTANEOUS at 14:19

## 2020-01-01 RX ADMIN — DIPHENHYDRAMINE HYDROCHLORIDE 25 MG: 25 CAPSULE ORAL at 15:00

## 2020-01-01 RX ADMIN — VORICONAZOLE 200 MG: 200 TABLET, FILM COATED ORAL at 22:05

## 2020-01-01 RX ADMIN — VANCOMYCIN HYDROCHLORIDE 1000 MG: 1 INJECTION, POWDER, LYOPHILIZED, FOR SOLUTION INTRAVENOUS at 12:56

## 2020-01-01 RX ADMIN — VORICONAZOLE 200 MG: 200 TABLET, FILM COATED ORAL at 21:40

## 2020-01-01 RX ADMIN — PANTOPRAZOLE SODIUM 40 MG: 40 TABLET, DELAYED RELEASE ORAL at 10:07

## 2020-01-01 RX ADMIN — CYTARABINE 2895 MG: 100 INJECTION, SOLUTION INTRATHECAL; INTRAVENOUS; SUBCUTANEOUS at 12:04

## 2020-01-01 RX ADMIN — Medication 10 ML: at 10:05

## 2020-01-01 RX ADMIN — SODIUM CHLORIDE 250 ML: 900 INJECTION, SOLUTION INTRAVENOUS at 15:10

## 2020-01-01 RX ADMIN — ONDANSETRON 8 MG: 2 INJECTION INTRAMUSCULAR; INTRAVENOUS at 15:18

## 2020-01-01 RX ADMIN — ACYCLOVIR 400 MG: 800 TABLET ORAL at 08:42

## 2020-01-01 RX ADMIN — DULOXETINE HYDROCHLORIDE 30 MG: 30 CAPSULE, DELAYED RELEASE ORAL at 10:06

## 2020-01-01 RX ADMIN — DULOXETINE HYDROCHLORIDE 30 MG: 30 CAPSULE, DELAYED RELEASE ORAL at 07:47

## 2020-01-01 RX ADMIN — FLUDARABINE PHOSPHATE 39 MG: 25 INJECTION, SOLUTION INTRAVENOUS at 14:42

## 2020-01-01 RX ADMIN — Medication 2 TABLET: at 08:49

## 2020-01-01 RX ADMIN — DIPHENHYDRAMINE HYDROCHLORIDE 25 MG: 25 CAPSULE ORAL at 09:23

## 2020-01-01 RX ADMIN — ALTEPLASE 1 MG: 2.2 INJECTION, POWDER, LYOPHILIZED, FOR SOLUTION INTRAVENOUS at 23:25

## 2020-01-01 RX ADMIN — PREDNISOLONE ACETATE 2 DROP: 10 SUSPENSION/ DROPS OPHTHALMIC at 21:37

## 2020-01-01 RX ADMIN — PANTOPRAZOLE SODIUM 40 MG: 40 TABLET, DELAYED RELEASE ORAL at 07:57

## 2020-01-01 RX ADMIN — PREDNISOLONE ACETATE 2 DROP: 10 SUSPENSION/ DROPS OPHTHALMIC at 00:24

## 2020-01-01 RX ADMIN — POTASSIUM CHLORIDE 20 MEQ: 20 TABLET, EXTENDED RELEASE ORAL at 07:48

## 2020-01-01 RX ADMIN — PREDNISOLONE ACETATE 2 DROP: 10 SUSPENSION/ DROPS OPHTHALMIC at 17:32

## 2020-01-01 RX ADMIN — Medication 500 UNITS: at 14:58

## 2020-01-01 RX ADMIN — PRAVASTATIN SODIUM 10 MG: 20 TABLET ORAL at 22:07

## 2020-01-01 RX ADMIN — ONDANSETRON 8 MG: 2 INJECTION INTRAMUSCULAR; INTRAVENOUS at 15:46

## 2020-01-01 RX ADMIN — Medication 10 ML: at 14:25

## 2020-01-01 RX ADMIN — Medication 20 ML: at 08:20

## 2020-01-01 RX ADMIN — VORICONAZOLE 200 MG: 200 TABLET, FILM COATED ORAL at 08:56

## 2020-01-01 RX ADMIN — VANCOMYCIN HYDROCHLORIDE 1500 MG: 10 INJECTION, POWDER, LYOPHILIZED, FOR SOLUTION INTRAVENOUS at 23:12

## 2020-01-01 RX ADMIN — LIDOCAINE HYDROCHLORIDE 100 MG: 20 INJECTION, SOLUTION INFILTRATION; PERINEURAL at 15:58

## 2020-01-01 RX ADMIN — DEXAMETHASONE SODIUM PHOSPHATE 4 MG: 10 INJECTION INTRAMUSCULAR; INTRAVENOUS at 14:04

## 2020-01-01 RX ADMIN — Medication 2 TABLET: at 07:49

## 2020-01-01 RX ADMIN — VORICONAZOLE 200 MG: 200 TABLET, FILM COATED ORAL at 20:44

## 2020-01-01 RX ADMIN — CYTARABINE 2895 MG: 100 INJECTION, SOLUTION INTRATHECAL; INTRAVENOUS; SUBCUTANEOUS at 18:08

## 2020-01-01 RX ADMIN — ONDANSETRON 8 MG: 2 INJECTION INTRAMUSCULAR; INTRAVENOUS at 02:25

## 2020-01-01 RX ADMIN — ACYCLOVIR 400 MG: 800 TABLET ORAL at 09:56

## 2020-01-01 RX ADMIN — DULOXETINE HYDROCHLORIDE 30 MG: 30 CAPSULE, DELAYED RELEASE ORAL at 08:20

## 2020-01-01 RX ADMIN — DIPHENHYDRAMINE HYDROCHLORIDE 25 MG: 25 CAPSULE ORAL at 09:07

## 2020-01-01 RX ADMIN — MIDAZOLAM 1 MG: 1 INJECTION INTRAMUSCULAR; INTRAVENOUS at 12:37

## 2020-01-01 RX ADMIN — CHOLECALCIFEROL (VITAMIN D3) 10 MCG (400 UNIT) TABLET 2 TABLET: at 08:52

## 2020-01-01 RX ADMIN — ACETAMINOPHEN 650 MG: 325 TABLET, FILM COATED ORAL at 11:01

## 2020-01-01 RX ADMIN — PREDNISOLONE ACETATE 2 DROP: 10 SUSPENSION/ DROPS OPHTHALMIC at 11:45

## 2020-01-01 RX ADMIN — POTASSIUM CHLORIDE 20 MEQ: 20 TABLET, EXTENDED RELEASE ORAL at 17:56

## 2020-01-01 RX ADMIN — ACYCLOVIR 400 MG: 800 TABLET ORAL at 09:29

## 2020-01-01 RX ADMIN — SODIUM CHLORIDE 250 ML: 900 INJECTION, SOLUTION INTRAVENOUS at 11:45

## 2020-01-01 RX ADMIN — POTASSIUM CHLORIDE 20 MEQ: 20 TABLET, EXTENDED RELEASE ORAL at 17:53

## 2020-01-01 RX ADMIN — Medication 10 ML: at 10:22

## 2020-01-01 RX ADMIN — DIPHENHYDRAMINE HYDROCHLORIDE 25 MG: 25 CAPSULE ORAL at 02:29

## 2020-01-01 RX ADMIN — Medication 10 ML: at 14:45

## 2020-01-01 RX ADMIN — PREDNISOLONE ACETATE 2 DROP: 10 SUSPENSION/ DROPS OPHTHALMIC at 11:00

## 2020-01-01 RX ADMIN — DIPHENHYDRAMINE HYDROCHLORIDE 25 MG: 25 CAPSULE ORAL at 12:04

## 2020-01-01 RX ADMIN — PRAVASTATIN SODIUM 10 MG: 20 TABLET ORAL at 22:23

## 2020-01-01 RX ADMIN — POTASSIUM CHLORIDE 20 MEQ: 1500 TABLET, EXTENDED RELEASE ORAL at 09:56

## 2020-01-01 RX ADMIN — ACYCLOVIR 400 MG: 800 TABLET ORAL at 16:43

## 2020-01-01 RX ADMIN — ZOLPIDEM TARTRATE 10 MG: 5 TABLET ORAL at 21:57

## 2020-01-01 RX ADMIN — ACYCLOVIR 400 MG: 800 TABLET ORAL at 08:02

## 2020-01-01 RX ADMIN — VORICONAZOLE 200 MG: 200 TABLET, FILM COATED ORAL at 08:34

## 2020-01-01 RX ADMIN — LORAZEPAM 1 MG: 1 TABLET ORAL at 22:01

## 2020-01-01 RX ADMIN — VORICONAZOLE 200 MG: 200 TABLET, FILM COATED ORAL at 21:20

## 2020-01-01 RX ADMIN — CYTARABINE 2895 MG: 2 INJECTION INTRATHECAL; INTRAVENOUS; SUBCUTANEOUS at 15:10

## 2020-01-01 RX ADMIN — ACYCLOVIR 400 MG: 800 TABLET ORAL at 18:33

## 2020-01-01 RX ADMIN — SENNOSIDES AND DOCUSATE SODIUM 1 TABLET: 8.6; 5 TABLET ORAL at 08:42

## 2020-01-01 RX ADMIN — ALLOPURINOL 300 MG: 300 TABLET ORAL at 09:02

## 2020-01-01 RX ADMIN — ACYCLOVIR 400 MG: 800 TABLET ORAL at 17:34

## 2020-01-01 RX ADMIN — CYTARABINE 2895 MG: 2 INJECTION INTRATHECAL; INTRAVENOUS; SUBCUTANEOUS at 03:00

## 2020-01-01 RX ADMIN — Medication 10 ML: at 15:31

## 2020-01-01 RX ADMIN — PRAVASTATIN SODIUM 10 MG: 20 TABLET ORAL at 22:15

## 2020-01-01 RX ADMIN — DEXAMETHASONE SODIUM PHOSPHATE 8 MG: 4 INJECTION, SOLUTION INTRAMUSCULAR; INTRAVENOUS at 10:26

## 2020-01-01 RX ADMIN — Medication 10 ML: at 16:34

## 2020-01-01 RX ADMIN — POTASSIUM CHLORIDE 20 MEQ: 20 TABLET, EXTENDED RELEASE ORAL at 08:27

## 2020-01-01 RX ADMIN — Medication 2 TABLET: at 09:11

## 2020-01-01 RX ADMIN — ZOLPIDEM TARTRATE 10 MG: 5 TABLET ORAL at 21:24

## 2020-01-01 RX ADMIN — GABAPENTIN 200 MG: 100 CAPSULE ORAL at 22:01

## 2020-01-01 RX ADMIN — TBO-FILGRASTIM 480 MCG: 480 INJECTION, SOLUTION SUBCUTANEOUS at 21:36

## 2020-01-01 RX ADMIN — PREDNISOLONE ACETATE 2 DROP: 10 SUSPENSION/ DROPS OPHTHALMIC at 10:12

## 2020-01-01 RX ADMIN — ONDANSETRON 8 MG: 2 INJECTION INTRAMUSCULAR; INTRAVENOUS at 05:52

## 2020-01-01 RX ADMIN — PRAVASTATIN SODIUM 10 MG: 20 TABLET ORAL at 21:48

## 2020-01-01 RX ADMIN — Medication 10 ML: at 11:32

## 2020-01-01 RX ADMIN — PREDNISOLONE ACETATE 2 DROP: 10 SUSPENSION/ DROPS OPHTHALMIC at 03:18

## 2020-01-01 RX ADMIN — Medication 40 ML: at 09:42

## 2020-01-01 RX ADMIN — PREDNISOLONE ACETATE 2 DROP: 10 SUSPENSION/ DROPS OPHTHALMIC at 17:16

## 2020-01-01 RX ADMIN — VANCOMYCIN HYDROCHLORIDE 1250 MG: 10 INJECTION, POWDER, LYOPHILIZED, FOR SOLUTION INTRAVENOUS at 11:59

## 2020-01-01 RX ADMIN — Medication 2 TABLET: at 08:58

## 2020-01-01 RX ADMIN — VORICONAZOLE 200 MG: 200 TABLET, FILM COATED ORAL at 08:58

## 2020-01-01 RX ADMIN — POLYETHYLENE GLYCOL 3350 17 G: 17 POWDER, FOR SOLUTION ORAL at 07:46

## 2020-01-01 RX ADMIN — LORAZEPAM 1 MG: 1 TABLET ORAL at 22:07

## 2020-01-01 RX ADMIN — Medication 10 ML: at 15:15

## 2020-01-01 RX ADMIN — ONDANSETRON 8 MG: 2 INJECTION INTRAMUSCULAR; INTRAVENOUS at 09:56

## 2020-01-01 RX ADMIN — PANTOPRAZOLE SODIUM 40 MG: 40 TABLET, DELAYED RELEASE ORAL at 08:20

## 2020-01-01 RX ADMIN — Medication 10 ML: at 09:58

## 2020-01-01 RX ADMIN — POLYETHYLENE GLYCOL 3350 17 G: 17 POWDER, FOR SOLUTION ORAL at 19:00

## 2020-01-01 RX ADMIN — DEXAMETHASONE SODIUM PHOSPHATE 8 MG: 4 INJECTION, SOLUTION INTRAMUSCULAR; INTRAVENOUS at 15:20

## 2020-01-01 RX ADMIN — ONDANSETRON 8 MG: 2 INJECTION INTRAMUSCULAR; INTRAVENOUS at 10:45

## 2020-01-01 RX ADMIN — ACETAMINOPHEN 650 MG: 325 TABLET, FILM COATED ORAL at 08:31

## 2020-01-01 RX ADMIN — ACYCLOVIR 400 MG: 800 TABLET ORAL at 17:45

## 2020-01-01 RX ADMIN — ACETAMINOPHEN: 500 TABLET, FILM COATED ORAL at 21:35

## 2020-01-01 RX ADMIN — DIPHENHYDRAMINE HYDROCHLORIDE 25 MG: 50 INJECTION, SOLUTION INTRAMUSCULAR; INTRAVENOUS at 14:00

## 2020-01-01 RX ADMIN — Medication 10 ML: at 12:12

## 2020-01-01 RX ADMIN — DEXAMETHASONE SODIUM PHOSPHATE 4 MG: 4 INJECTION, SOLUTION INTRAMUSCULAR; INTRAVENOUS at 09:59

## 2020-01-01 RX ADMIN — Medication 10 ML: at 10:10

## 2020-01-01 RX ADMIN — PRAVASTATIN SODIUM 10 MG: 20 TABLET ORAL at 22:06

## 2020-01-01 RX ADMIN — DEXAMETHASONE SODIUM PHOSPHATE 8 MG: 4 INJECTION, SOLUTION INTRAMUSCULAR; INTRAVENOUS at 11:18

## 2020-01-01 RX ADMIN — DEXAMETHASONE SODIUM PHOSPHATE 4 MG: 4 INJECTION, SOLUTION INTRAMUSCULAR; INTRAVENOUS at 17:33

## 2020-01-01 RX ADMIN — DULOXETINE HYDROCHLORIDE 30 MG: 30 CAPSULE, DELAYED RELEASE ORAL at 08:52

## 2020-01-01 RX ADMIN — DULOXETINE HYDROCHLORIDE 30 MG: 30 CAPSULE, DELAYED RELEASE ORAL at 08:29

## 2020-01-01 RX ADMIN — DIPHENHYDRAMINE HYDROCHLORIDE 25 MG: 25 CAPSULE ORAL at 16:08

## 2020-01-01 RX ADMIN — VORICONAZOLE 200 MG: 200 TABLET, FILM COATED ORAL at 07:59

## 2020-01-01 RX ADMIN — LORAZEPAM 1 MG: 1 TABLET ORAL at 21:57

## 2020-01-01 RX ADMIN — PRAVASTATIN SODIUM 10 MG: 20 TABLET ORAL at 22:16

## 2020-01-01 RX ADMIN — PREDNISOLONE ACETATE 2 DROP: 10 SUSPENSION/ DROPS OPHTHALMIC at 06:34

## 2020-01-01 RX ADMIN — DEXAMETHASONE SODIUM PHOSPHATE 8 MG: 4 INJECTION, SOLUTION INTRAMUSCULAR; INTRAVENOUS at 15:38

## 2020-01-01 RX ADMIN — ACETAMINOPHEN 650 MG: 325 TABLET, FILM COATED ORAL at 12:42

## 2020-01-01 RX ADMIN — AZACITIDINE 92 MG: 100 INJECTION, POWDER, LYOPHILIZED, FOR SOLUTION INTRAVENOUS; SUBCUTANEOUS at 11:30

## 2020-01-01 RX ADMIN — VORICONAZOLE 200 MG: 200 TABLET, FILM COATED ORAL at 21:03

## 2020-01-01 RX ADMIN — VORICONAZOLE 200 MG: 200 TABLET, FILM COATED ORAL at 08:27

## 2020-01-01 RX ADMIN — Medication 10 ML: at 12:50

## 2020-01-01 RX ADMIN — PREDNISOLONE ACETATE 2 DROP: 10 SUSPENSION/ DROPS OPHTHALMIC at 06:00

## 2020-01-01 RX ADMIN — ZOLPIDEM TARTRATE 5 MG: 5 TABLET ORAL at 22:27

## 2020-01-01 RX ADMIN — ONDANSETRON 8 MG: 2 INJECTION INTRAMUSCULAR; INTRAVENOUS at 16:09

## 2020-01-01 RX ADMIN — HEPARIN 300 UNITS: 100 SYRINGE at 09:32

## 2020-01-01 RX ADMIN — ACETAMINOPHEN 650 MG: 325 TABLET, FILM COATED ORAL at 14:24

## 2020-01-01 RX ADMIN — ACYCLOVIR 400 MG: 800 TABLET ORAL at 17:26

## 2020-01-01 RX ADMIN — DIPHENHYDRAMINE HYDROCHLORIDE 25 MG: 25 CAPSULE ORAL at 09:41

## 2020-01-01 RX ADMIN — CYTARABINE 2895 MG: 2 INJECTION INTRATHECAL; INTRAVENOUS; SUBCUTANEOUS at 02:44

## 2020-01-01 RX ADMIN — Medication 10 ML: at 11:25

## 2020-01-01 RX ADMIN — PREDNISOLONE ACETATE 2 DROP: 10 SUSPENSION/ DROPS OPHTHALMIC at 20:14

## 2020-01-01 RX ADMIN — LEVOFLOXACIN 500 MG: 500 TABLET, FILM COATED ORAL at 07:47

## 2020-01-01 RX ADMIN — Medication 10 ML: at 21:22

## 2020-01-01 RX ADMIN — DIPHENHYDRAMINE HYDROCHLORIDE 25 MG: 25 CAPSULE ORAL at 12:11

## 2020-01-01 RX ADMIN — ALTEPLASE 2 MG: 2.2 INJECTION, POWDER, LYOPHILIZED, FOR SOLUTION INTRAVENOUS at 23:04

## 2020-01-01 RX ADMIN — VORICONAZOLE 200 MG: 200 TABLET, FILM COATED ORAL at 22:01

## 2020-01-01 RX ADMIN — CYTARABINE 2895 MG: 2 INJECTION INTRATHECAL; INTRAVENOUS; SUBCUTANEOUS at 14:49

## 2020-01-01 RX ADMIN — ACYCLOVIR 400 MG: 800 TABLET ORAL at 08:59

## 2020-01-01 RX ADMIN — CYTARABINE 2895 MG: 2 INJECTION INTRATHECAL; INTRAVENOUS; SUBCUTANEOUS at 02:56

## 2020-01-01 RX ADMIN — ENOXAPARIN SODIUM 40 MG: 40 INJECTION SUBCUTANEOUS at 09:03

## 2020-01-01 RX ADMIN — Medication 10 ML: at 08:59

## 2020-01-01 RX ADMIN — ACYCLOVIR 400 MG: 800 TABLET ORAL at 07:47

## 2020-01-01 RX ADMIN — ACETAMINOPHEN 650 MG: 325 TABLET, FILM COATED ORAL at 09:41

## 2020-01-01 RX ADMIN — VORICONAZOLE 200 MG: 200 TABLET, FILM COATED ORAL at 21:24

## 2020-01-01 RX ADMIN — Medication 10 ML: at 10:57

## 2020-01-01 RX ADMIN — ACETAMINOPHEN 650 MG: 325 TABLET, FILM COATED ORAL at 09:16

## 2020-01-01 RX ADMIN — LORAZEPAM 1 MG: 1 TABLET ORAL at 22:17

## 2020-01-01 RX ADMIN — FENTANYL CITRATE 25 MCG: 50 INJECTION, SOLUTION INTRAMUSCULAR; INTRAVENOUS at 08:20

## 2020-01-01 RX ADMIN — POTASSIUM CHLORIDE 20 MEQ: 20 TABLET, EXTENDED RELEASE ORAL at 09:11

## 2020-01-01 RX ADMIN — ACETAMINOPHEN 650 MG: 325 TABLET, FILM COATED ORAL at 20:22

## 2020-01-01 RX ADMIN — PREDNISOLONE ACETATE 2 DROP: 10 SUSPENSION/ DROPS OPHTHALMIC at 04:33

## 2020-01-01 RX ADMIN — POTASSIUM CHLORIDE 20 MEQ: 20 TABLET, EXTENDED RELEASE ORAL at 08:02

## 2020-01-01 RX ADMIN — VANCOMYCIN HYDROCHLORIDE 1000 MG: 1 INJECTION, POWDER, LYOPHILIZED, FOR SOLUTION INTRAVENOUS at 14:55

## 2020-01-01 RX ADMIN — Medication 30 ML: at 08:30

## 2020-01-01 RX ADMIN — AZACITIDINE 92 MG: 100 INJECTION, POWDER, LYOPHILIZED, FOR SOLUTION INTRAVENOUS; SUBCUTANEOUS at 14:40

## 2020-01-01 RX ADMIN — FENTANYL CITRATE 50 MCG: 50 INJECTION, SOLUTION INTRAMUSCULAR; INTRAVENOUS at 12:47

## 2020-01-01 RX ADMIN — AZACITIDINE 91 MG: 100 INJECTION, POWDER, LYOPHILIZED, FOR SOLUTION INTRAVENOUS; SUBCUTANEOUS at 12:06

## 2020-01-01 RX ADMIN — DEXAMETHASONE SODIUM PHOSPHATE 4 MG: 4 INJECTION, SOLUTION INTRAMUSCULAR; INTRAVENOUS at 05:04

## 2020-01-01 RX ADMIN — VANCOMYCIN HYDROCHLORIDE 1000 MG: 1 INJECTION, POWDER, LYOPHILIZED, FOR SOLUTION INTRAVENOUS at 01:36

## 2020-01-01 RX ADMIN — VANCOMYCIN HYDROCHLORIDE 1000 MG: 1 INJECTION, POWDER, LYOPHILIZED, FOR SOLUTION INTRAVENOUS at 11:24

## 2020-01-01 RX ADMIN — DIPHENHYDRAMINE HYDROCHLORIDE 25 MG: 25 CAPSULE ORAL at 12:07

## 2020-01-01 RX ADMIN — PREDNISOLONE ACETATE 2 DROP: 10 SUSPENSION/ DROPS OPHTHALMIC at 19:16

## 2020-01-01 RX ADMIN — LORAZEPAM 1 MG: 1 TABLET ORAL at 21:48

## 2020-01-01 RX ADMIN — SODIUM CHLORIDE 75 ML/HR: 900 INJECTION, SOLUTION INTRAVENOUS at 21:54

## 2020-01-01 RX ADMIN — Medication 10 ML: at 15:47

## 2020-01-01 RX ADMIN — DEXAMETHASONE SODIUM PHOSPHATE 8 MG: 4 INJECTION, SOLUTION INTRAMUSCULAR; INTRAVENOUS at 14:51

## 2020-01-01 RX ADMIN — Medication 400 MG: at 08:29

## 2020-01-01 RX ADMIN — AZACITIDINE 151 MG: 100 INJECTION, POWDER, LYOPHILIZED, FOR SOLUTION INTRAVENOUS; SUBCUTANEOUS at 15:56

## 2020-01-01 RX ADMIN — DIPHENHYDRAMINE HYDROCHLORIDE 25 MG: 50 INJECTION, SOLUTION INTRAMUSCULAR; INTRAVENOUS at 12:37

## 2020-01-01 RX ADMIN — LORAZEPAM 1 MG: 1 TABLET ORAL at 22:04

## 2020-01-01 RX ADMIN — VORICONAZOLE 200 MG: 200 TABLET, FILM COATED ORAL at 08:50

## 2020-01-01 RX ADMIN — Medication 10 ML: at 17:08

## 2020-01-01 RX ADMIN — FENTANYL CITRATE 25 MCG: 50 INJECTION, SOLUTION INTRAMUSCULAR; INTRAVENOUS at 08:23

## 2020-01-01 RX ADMIN — Medication 2 TABLET: at 08:20

## 2020-01-01 RX ADMIN — POLYETHYLENE GLYCOL 3350 17 G: 17 POWDER, FOR SOLUTION ORAL at 09:11

## 2020-01-01 RX ADMIN — ACYCLOVIR 400 MG: 800 TABLET ORAL at 17:36

## 2020-01-01 RX ADMIN — Medication 10 ML: at 09:20

## 2020-01-01 RX ADMIN — ACETAMINOPHEN 650 MG: 325 TABLET, FILM COATED ORAL at 02:29

## 2020-01-01 RX ADMIN — PRAVASTATIN SODIUM 10 MG: 20 TABLET ORAL at 21:39

## 2020-01-01 RX ADMIN — Medication 10 ML: at 12:06

## 2020-01-01 RX ADMIN — SODIUM CHLORIDE 25 ML/HR: 9 INJECTION, SOLUTION INTRAVENOUS at 14:00

## 2020-01-01 RX ADMIN — Medication 10 ML: at 13:50

## 2020-01-01 RX ADMIN — ALTEPLASE 1 MG: KIT at 05:29

## 2020-01-01 RX ADMIN — ZOLPIDEM TARTRATE 5 MG: 5 TABLET ORAL at 22:05

## 2020-01-01 RX ADMIN — CEFEPIME HYDROCHLORIDE 2 G: 2 INJECTION, POWDER, FOR SOLUTION INTRAVENOUS at 05:36

## 2020-01-01 RX ADMIN — VORICONAZOLE 200 MG: 200 TABLET, FILM COATED ORAL at 22:29

## 2020-01-01 RX ADMIN — DEXAMETHASONE SODIUM PHOSPHATE 4 MG: 4 INJECTION, SOLUTION INTRAMUSCULAR; INTRAVENOUS at 11:06

## 2020-01-01 RX ADMIN — CYTARABINE 2895 MG: 2 INJECTION INTRATHECAL; INTRAVENOUS; SUBCUTANEOUS at 10:22

## 2020-01-01 RX ADMIN — DIPHENHYDRAMINE HYDROCHLORIDE 25 MG: 25 CAPSULE ORAL at 09:16

## 2020-01-01 RX ADMIN — ONDANSETRON 8 MG: 2 INJECTION INTRAMUSCULAR; INTRAVENOUS at 11:35

## 2020-01-01 RX ADMIN — POTASSIUM CHLORIDE 20 MEQ: 20 TABLET, EXTENDED RELEASE ORAL at 07:46

## 2020-01-01 RX ADMIN — ACETAMINOPHEN 650 MG: 325 TABLET, FILM COATED ORAL at 12:11

## 2020-01-01 RX ADMIN — PREDNISOLONE ACETATE 2 DROP: 10 SUSPENSION/ DROPS OPHTHALMIC at 22:18

## 2020-01-01 RX ADMIN — CYTARABINE 2895 MG: 100 INJECTION, SOLUTION INTRATHECAL; INTRAVENOUS; SUBCUTANEOUS at 02:13

## 2020-01-01 RX ADMIN — Medication 400 MG: at 10:07

## 2020-01-01 RX ADMIN — Medication 2 TABLET: at 09:02

## 2020-01-01 RX ADMIN — Medication 10 ML: at 11:20

## 2020-01-01 RX ADMIN — HEPARIN SODIUM (PORCINE) LOCK FLUSH IV SOLN 100 UNIT/ML 500 UNITS: 100 SOLUTION at 14:33

## 2020-01-01 RX ADMIN — PANTOPRAZOLE SODIUM 40 MG: 40 TABLET, DELAYED RELEASE ORAL at 15:56

## 2020-01-01 RX ADMIN — TBO-FILGRASTIM 300 MCG: 300 INJECTION, SOLUTION SUBCUTANEOUS at 09:59

## 2020-01-01 RX ADMIN — ZOLPIDEM TARTRATE 10 MG: 5 TABLET ORAL at 22:09

## 2020-01-01 RX ADMIN — DEXAMETHASONE SODIUM PHOSPHATE 4 MG: 4 INJECTION, SOLUTION INTRAMUSCULAR; INTRAVENOUS at 01:27

## 2020-01-01 RX ADMIN — Medication 2 TABLET: at 07:46

## 2020-01-01 RX ADMIN — SODIUM CHLORIDE 25 ML/HR: 9 INJECTION, SOLUTION INTRAVENOUS at 14:10

## 2020-01-01 RX ADMIN — DIPHENHYDRAMINE HYDROCHLORIDE 50 MG: 25 CAPSULE ORAL at 13:07

## 2020-01-01 RX ADMIN — MIDAZOLAM 0.5 MG: 1 INJECTION INTRAMUSCULAR; INTRAVENOUS at 08:20

## 2020-01-01 RX ADMIN — ACYCLOVIR 400 MG: 800 TABLET ORAL at 08:45

## 2020-01-01 RX ADMIN — POTASSIUM CHLORIDE 20 MEQ: 20 TABLET, EXTENDED RELEASE ORAL at 07:53

## 2020-01-01 RX ADMIN — MAGNESIUM SULFATE HEPTAHYDRATE 4 G: 40 INJECTION, SOLUTION INTRAVENOUS at 08:35

## 2020-01-01 RX ADMIN — DEXAMETHASONE SODIUM PHOSPHATE 4 MG: 4 INJECTION, SOLUTION INTRAMUSCULAR; INTRAVENOUS at 05:53

## 2020-01-01 RX ADMIN — PREDNISOLONE ACETATE 2 DROP: 10 SUSPENSION/ DROPS OPHTHALMIC at 04:10

## 2020-01-01 RX ADMIN — ZOLPIDEM TARTRATE 10 MG: 5 TABLET ORAL at 22:05

## 2020-01-01 RX ADMIN — CYTARABINE 2895 MG: 100 INJECTION, SOLUTION INTRATHECAL; INTRAVENOUS; SUBCUTANEOUS at 18:32

## 2020-01-01 RX ADMIN — VORICONAZOLE 200 MG: 200 TABLET, FILM COATED ORAL at 08:52

## 2020-01-01 RX ADMIN — SODIUM CHLORIDE 250 ML: 900 INJECTION, SOLUTION INTRAVENOUS at 09:50

## 2020-01-01 RX ADMIN — ONDANSETRON 8 MG: 2 INJECTION INTRAMUSCULAR; INTRAVENOUS at 02:24

## 2020-01-01 RX ADMIN — Medication 10 ML: at 08:10

## 2020-01-01 RX ADMIN — PREDNISOLONE ACETATE 2 DROP: 10 SUSPENSION/ DROPS OPHTHALMIC at 14:04

## 2020-01-01 RX ADMIN — ALLOPURINOL 300 MG: 300 TABLET ORAL at 00:00

## 2020-01-01 RX ADMIN — ALTEPLASE 2 MG: 2.2 INJECTION, POWDER, LYOPHILIZED, FOR SOLUTION INTRAVENOUS at 01:56

## 2020-01-01 RX ADMIN — ONDANSETRON 8 MG: 2 INJECTION INTRAMUSCULAR; INTRAVENOUS at 16:03

## 2020-01-01 RX ADMIN — LORAZEPAM 1 MG: 1 TABLET ORAL at 22:09

## 2020-01-01 RX ADMIN — AZACITIDINE 92 MG: 100 INJECTION, POWDER, LYOPHILIZED, FOR SOLUTION INTRAVENOUS; SUBCUTANEOUS at 16:43

## 2020-01-01 RX ADMIN — VORICONAZOLE 200 MG: 200 TABLET, FILM COATED ORAL at 08:12

## 2020-01-01 RX ADMIN — MIDAZOLAM 1 MG: 1 INJECTION INTRAMUSCULAR; INTRAVENOUS at 15:53

## 2020-01-01 RX ADMIN — ONDANSETRON 8 MG: 2 INJECTION INTRAMUSCULAR; INTRAVENOUS at 04:51

## 2020-01-01 RX ADMIN — POTASSIUM CHLORIDE 20 MEQ: 20 TABLET, EXTENDED RELEASE ORAL at 18:03

## 2020-01-01 RX ADMIN — ALPRAZOLAM 1 MG: 0.5 TABLET ORAL at 22:15

## 2020-01-01 RX ADMIN — POTASSIUM CHLORIDE 20 MEQ: 20 TABLET, EXTENDED RELEASE ORAL at 18:07

## 2020-01-01 RX ADMIN — PREDNISOLONE ACETATE 2 DROP: 10 SUSPENSION/ DROPS OPHTHALMIC at 09:19

## 2020-01-01 RX ADMIN — Medication 10 ML: at 16:00

## 2020-01-01 RX ADMIN — DULOXETINE 30 MG: 30 CAPSULE, DELAYED RELEASE ORAL at 07:53

## 2020-01-01 RX ADMIN — ACETAMINOPHEN 650 MG: 325 TABLET, FILM COATED ORAL at 13:07

## 2020-01-01 RX ADMIN — DIPHENHYDRAMINE HYDROCHLORIDE 25 MG: 25 CAPSULE ORAL at 11:08

## 2020-01-01 RX ADMIN — ONDANSETRON 8 MG: 2 INJECTION INTRAMUSCULAR; INTRAVENOUS at 10:20

## 2020-01-01 RX ADMIN — Medication 2 TABLET: at 08:07

## 2020-01-01 RX ADMIN — SODIUM CHLORIDE 75 ML/HR: 900 INJECTION, SOLUTION INTRAVENOUS at 19:51

## 2020-01-01 RX ADMIN — PANTOPRAZOLE SODIUM 40 MG: 40 TABLET, DELAYED RELEASE ORAL at 16:30

## 2020-01-01 RX ADMIN — Medication 2 TABLET: at 09:29

## 2020-01-01 RX ADMIN — Medication 10 ML: at 14:24

## 2020-01-01 RX ADMIN — ACETAMINOPHEN 650 MG: 325 TABLET, FILM COATED ORAL at 11:07

## 2020-01-01 RX ADMIN — Medication 10 ML: at 10:32

## 2020-01-01 RX ADMIN — Medication 10 ML: at 10:43

## 2020-01-01 RX ADMIN — PRAVASTATIN SODIUM 10 MG: 20 TABLET ORAL at 22:19

## 2020-01-01 RX ADMIN — ACETAMINOPHEN 650 MG: 325 TABLET, FILM COATED ORAL at 09:23

## 2020-01-01 RX ADMIN — SODIUM CHLORIDE 25 ML/HR: 900 INJECTION, SOLUTION INTRAVENOUS at 10:22

## 2020-01-01 RX ADMIN — Medication 20 ML: at 11:26

## 2020-01-01 RX ADMIN — LEVOFLOXACIN 500 MG: 500 TABLET, FILM COATED ORAL at 07:49

## 2020-01-01 RX ADMIN — SODIUM CHLORIDE 250 ML: 900 INJECTION, SOLUTION INTRAVENOUS at 12:14

## 2020-01-01 RX ADMIN — POTASSIUM CHLORIDE 20 MEQ: 1500 TABLET, EXTENDED RELEASE ORAL at 10:06

## 2020-01-01 RX ADMIN — PREDNISOLONE ACETATE 2 DROP: 10 SUSPENSION/ DROPS OPHTHALMIC at 23:27

## 2020-01-01 RX ADMIN — SODIUM CHLORIDE 75 ML/HR: 900 INJECTION, SOLUTION INTRAVENOUS at 00:22

## 2020-01-01 RX ADMIN — DEXAMETHASONE SODIUM PHOSPHATE 4 MG: 4 INJECTION, SOLUTION INTRAMUSCULAR; INTRAVENOUS at 14:47

## 2020-01-01 RX ADMIN — ACYCLOVIR 400 MG: 800 TABLET ORAL at 17:57

## 2020-01-01 RX ADMIN — PREDNISOLONE ACETATE 2 DROP: 10 SUSPENSION/ DROPS OPHTHALMIC at 12:21

## 2020-01-01 RX ADMIN — ENOXAPARIN SODIUM 40 MG: 40 INJECTION SUBCUTANEOUS at 08:52

## 2020-01-01 RX ADMIN — PRAVASTATIN SODIUM 10 MG: 20 TABLET ORAL at 21:12

## 2020-01-01 RX ADMIN — SODIUM CHLORIDE 250 ML: 900 INJECTION, SOLUTION INTRAVENOUS at 09:41

## 2020-01-01 RX ADMIN — ALLOPURINOL 300 MG: 300 TABLET ORAL at 08:45

## 2020-01-01 RX ADMIN — Medication 60 ML: at 08:40

## 2020-01-01 RX ADMIN — VANCOMYCIN HYDROCHLORIDE 1000 MG: 1 INJECTION, POWDER, LYOPHILIZED, FOR SOLUTION INTRAVENOUS at 22:07

## 2020-01-01 RX ADMIN — ALLOPURINOL 300 MG: 300 TABLET ORAL at 09:11

## 2020-01-01 RX ADMIN — Medication 500 UNITS: at 06:44

## 2020-01-01 RX ADMIN — SODIUM CHLORIDE 25 ML/HR: 900 INJECTION, SOLUTION INTRAVENOUS at 15:15

## 2020-01-01 RX ADMIN — Medication 2 TABLET: at 08:29

## 2020-01-01 RX ADMIN — ZOLPIDEM TARTRATE 10 MG: 5 TABLET ORAL at 21:56

## 2020-01-01 RX ADMIN — GABAPENTIN 200 MG: 100 CAPSULE ORAL at 22:04

## 2020-01-01 RX ADMIN — VORICONAZOLE 200 MG: 200 TABLET, FILM COATED ORAL at 22:24

## 2020-01-01 RX ADMIN — DEXAMETHASONE SODIUM PHOSPHATE 8 MG: 4 INJECTION, SOLUTION INTRAMUSCULAR; INTRAVENOUS at 10:03

## 2020-01-01 RX ADMIN — ONDANSETRON 8 MG: 2 INJECTION INTRAMUSCULAR; INTRAVENOUS at 15:11

## 2020-01-01 RX ADMIN — ACYCLOVIR 400 MG: 800 TABLET ORAL at 08:07

## 2020-01-01 RX ADMIN — PREDNISOLONE ACETATE 2 DROP: 10 SUSPENSION/ DROPS OPHTHALMIC at 17:54

## 2020-01-01 RX ADMIN — ACETAMINOPHEN 650 MG: 325 TABLET, FILM COATED ORAL at 12:07

## 2020-01-01 RX ADMIN — VANCOMYCIN HYDROCHLORIDE 1250 MG: 10 INJECTION, POWDER, LYOPHILIZED, FOR SOLUTION INTRAVENOUS at 23:06

## 2020-01-01 RX ADMIN — DEXAMETHASONE SODIUM PHOSPHATE 4 MG: 4 INJECTION, SOLUTION INTRAMUSCULAR; INTRAVENOUS at 19:50

## 2020-01-01 RX ADMIN — LORAZEPAM 1 MG: 1 TABLET ORAL at 22:19

## 2020-01-01 RX ADMIN — LEVOFLOXACIN 500 MG: 500 TABLET, FILM COATED ORAL at 08:01

## 2020-01-01 RX ADMIN — Medication 10 ML: at 08:21

## 2020-01-01 RX ADMIN — ALPRAZOLAM 1 MG: 0.5 TABLET ORAL at 21:55

## 2020-01-01 RX ADMIN — Medication 10 ML: at 15:59

## 2020-01-01 RX ADMIN — PANTOPRAZOLE SODIUM 40 MG: 40 TABLET, DELAYED RELEASE ORAL at 08:29

## 2020-01-01 RX ADMIN — ALTEPLASE 2 MG: 2.2 INJECTION, POWDER, LYOPHILIZED, FOR SOLUTION INTRAVENOUS at 05:44

## 2020-01-01 RX ADMIN — AZACITIDINE 92 MG: 100 INJECTION, POWDER, LYOPHILIZED, FOR SOLUTION INTRAVENOUS; SUBCUTANEOUS at 16:45

## 2020-01-01 RX ADMIN — VANCOMYCIN HYDROCHLORIDE 1250 MG: 10 INJECTION, POWDER, LYOPHILIZED, FOR SOLUTION INTRAVENOUS at 11:51

## 2020-01-01 RX ADMIN — VORICONAZOLE 200 MG: 200 TABLET, FILM COATED ORAL at 21:12

## 2020-01-01 RX ADMIN — ACYCLOVIR 400 MG: 800 TABLET ORAL at 10:06

## 2020-01-01 RX ADMIN — PREDNISOLONE ACETATE 2 DROP: 10 SUSPENSION/ DROPS OPHTHALMIC at 06:28

## 2020-01-01 RX ADMIN — ACYCLOVIR 400 MG: 800 TABLET ORAL at 16:42

## 2020-01-01 RX ADMIN — POTASSIUM CHLORIDE 20 MEQ: 1500 TABLET, EXTENDED RELEASE ORAL at 08:50

## 2020-01-01 RX ADMIN — PREDNISOLONE ACETATE 2 DROP: 10 SUSPENSION/ DROPS OPHTHALMIC at 22:21

## 2020-01-01 RX ADMIN — SODIUM CHLORIDE 75 ML/HR: 900 INJECTION, SOLUTION INTRAVENOUS at 04:58

## 2020-01-01 RX ADMIN — LORAZEPAM 1 MG: 1 TABLET ORAL at 22:05

## 2020-01-01 RX ADMIN — HEPARIN 300 UNITS: 100 SYRINGE at 16:20

## 2020-01-01 RX ADMIN — CHOLECALCIFEROL (VITAMIN D3) 10 MCG (400 UNIT) TABLET 2 TABLET: at 08:46

## 2020-01-01 RX ADMIN — VORICONAZOLE 200 MG: 200 TABLET, FILM COATED ORAL at 20:57

## 2020-01-01 RX ADMIN — Medication 10 ML: at 16:20

## 2020-01-01 RX ADMIN — VORICONAZOLE 200 MG: 200 TABLET, FILM COATED ORAL at 10:07

## 2020-01-01 RX ADMIN — HEPARIN 300 UNITS: 100 SYRINGE at 09:35

## 2020-01-01 RX ADMIN — POTASSIUM CHLORIDE 20 MEQ: 20 TABLET, EXTENDED RELEASE ORAL at 18:33

## 2020-01-01 RX ADMIN — Medication 10 ML: at 15:01

## 2020-01-01 RX ADMIN — ALLOPURINOL 300 MG: 300 TABLET ORAL at 07:54

## 2020-01-01 RX ADMIN — PREDNISOLONE ACETATE 2 DROP: 10 SUSPENSION/ DROPS OPHTHALMIC at 00:33

## 2020-01-01 RX ADMIN — DULOXETINE HYDROCHLORIDE 30 MG: 30 CAPSULE, DELAYED RELEASE ORAL at 08:01

## 2020-01-01 RX ADMIN — ACETAMINOPHEN 650 MG: 325 TABLET, FILM COATED ORAL at 09:07

## 2020-01-01 RX ADMIN — SODIUM CHLORIDE 250 ML: 900 INJECTION, SOLUTION INTRAVENOUS at 09:15

## 2020-01-01 RX ADMIN — ZOLPIDEM TARTRATE 5 MG: 5 TABLET ORAL at 22:00

## 2020-01-01 RX ADMIN — ACYCLOVIR 400 MG: 800 TABLET ORAL at 17:32

## 2020-01-01 RX ADMIN — DIPHENHYDRAMINE HYDROCHLORIDE 25 MG: 25 CAPSULE ORAL at 11:01

## 2020-01-01 RX ADMIN — PANTOPRAZOLE SODIUM 40 MG: 40 TABLET, DELAYED RELEASE ORAL at 05:45

## 2020-01-01 RX ADMIN — ACYCLOVIR 400 MG: 800 TABLET ORAL at 08:35

## 2020-01-01 RX ADMIN — VANCOMYCIN HYDROCHLORIDE 1250 MG: 10 INJECTION, POWDER, LYOPHILIZED, FOR SOLUTION INTRAVENOUS at 04:51

## 2020-01-01 RX ADMIN — DEXAMETHASONE SODIUM PHOSPHATE 8 MG: 4 INJECTION, SOLUTION INTRAMUSCULAR; INTRAVENOUS at 15:29

## 2020-01-01 RX ADMIN — VANCOMYCIN HYDROCHLORIDE 1500 MG: 10 INJECTION, POWDER, LYOPHILIZED, FOR SOLUTION INTRAVENOUS at 11:39

## 2020-01-01 RX ADMIN — POTASSIUM CHLORIDE 20 MEQ: 1500 TABLET, EXTENDED RELEASE ORAL at 08:47

## 2020-01-01 RX ADMIN — ACYCLOVIR 400 MG: 800 TABLET ORAL at 08:20

## 2020-01-01 RX ADMIN — ONDANSETRON 8 MG: 2 INJECTION INTRAMUSCULAR; INTRAVENOUS at 14:23

## 2020-01-01 RX ADMIN — ACETAMINOPHEN 650 MG: 325 TABLET, FILM COATED ORAL at 11:45

## 2020-01-01 RX ADMIN — PANTOPRAZOLE SODIUM 40 MG: 40 TABLET, DELAYED RELEASE ORAL at 08:34

## 2020-01-01 RX ADMIN — ACYCLOVIR 400 MG: 800 TABLET ORAL at 08:58

## 2020-01-01 NOTE — PROGRESS NOTES
END OF SHIFT NOTE:    Intake/Output  24 I&O = -418    Voiding: YES    Stool:  0 occurrences. Emesis:  0 occurrences. VITAL SIGNS  Patient Vitals for the past 12 hrs:   Temp Pulse Resp BP SpO2   01/01/20 0723 98.2 °F (36.8 °C) 82 16 134/60 98 %   01/01/20 0333 97.6 °F (36.4 °C) 91 16 133/56 96 %   12/31/19 2323 98.3 °F (36.8 °C) 100 16 127/63 94 %       Pain Assessment  Pain 1  Pain Scale 1: Numeric (0 - 10) (01/01/20 0753)  Pain Intensity 1: 0 (01/01/20 0753)  Patient Stated Pain Goal: 0 (01/01/20 0753)    Ambulating  Yes, to BR    Additional Information: Afebrile, no c/o voiced. Appears to have rested well this HS. Shift report given to oncoming nurse at the bedside.     Emeterio Ashton RN

## 2020-01-01 NOTE — PROGRESS NOTES
Problem: Falls - Risk of  Goal: *Absence of Falls  Description  Document Easter Getting Fall Risk and appropriate interventions in the flowsheet.   Outcome: Progressing Towards Goal  Note: Fall Risk Interventions:  Mobility Interventions: Patient to call before getting OOB         Medication Interventions: Evaluate medications/consider consulting pharmacy, Patient to call before getting OOB    Elimination Interventions: Call light in reach    History of Falls Interventions: Evaluate medications/consider consulting pharmacy

## 2020-01-02 LAB
ALBUMIN SERPL-MCNC: 3 G/DL (ref 3.2–4.6)
ALBUMIN/GLOB SERPL: 1 {RATIO} (ref 1.2–3.5)
ALP SERPL-CCNC: 100 U/L (ref 50–136)
ALT SERPL-CCNC: 26 U/L (ref 12–65)
ANION GAP SERPL CALC-SCNC: 6 MMOL/L (ref 7–16)
AST SERPL-CCNC: 20 U/L (ref 15–37)
BASOPHILS # BLD: 0 K/UL (ref 0–0.2)
BASOPHILS NFR BLD: 0 % (ref 0–2)
BILIRUB SERPL-MCNC: 0.3 MG/DL (ref 0.2–1.1)
BUN SERPL-MCNC: 19 MG/DL (ref 8–23)
CALCIUM SERPL-MCNC: 9.3 MG/DL (ref 8.3–10.4)
CHLORIDE SERPL-SCNC: 110 MMOL/L (ref 98–107)
CO2 SERPL-SCNC: 26 MMOL/L (ref 21–32)
CREAT SERPL-MCNC: 0.59 MG/DL (ref 0.6–1)
DIFFERENTIAL METHOD BLD: ABNORMAL
EOSINOPHIL # BLD: 0 K/UL (ref 0–0.8)
EOSINOPHIL NFR BLD: 0 % (ref 0.5–7.8)
ERYTHROCYTE [DISTWIDTH] IN BLOOD BY AUTOMATED COUNT: 20.8 % (ref 11.9–14.6)
GLOBULIN SER CALC-MCNC: 2.9 G/DL (ref 2.3–3.5)
GLUCOSE SERPL-MCNC: 148 MG/DL (ref 65–100)
HCT VFR BLD AUTO: 26.8 % (ref 35.8–46.3)
HGB BLD-MCNC: 8.6 G/DL (ref 11.7–15.4)
IMM GRANULOCYTES # BLD AUTO: 0 K/UL (ref 0–0.5)
IMM GRANULOCYTES NFR BLD AUTO: 0 % (ref 0–5)
LYMPHOCYTES # BLD: 0.1 K/UL (ref 0.5–4.6)
LYMPHOCYTES NFR BLD: 1 % (ref 13–44)
MAGNESIUM SERPL-MCNC: 2 MG/DL (ref 1.8–2.4)
MCH RBC QN AUTO: 32 PG (ref 26.1–32.9)
MCHC RBC AUTO-ENTMCNC: 32.1 G/DL (ref 31.4–35)
MCV RBC AUTO: 99.6 FL (ref 79.6–97.8)
MONOCYTES # BLD: 0.2 K/UL (ref 0.1–1.3)
MONOCYTES NFR BLD: 3 % (ref 4–12)
NEUTS SEG # BLD: 7 K/UL (ref 1.7–8.2)
NEUTS SEG NFR BLD: 96 % (ref 43–78)
NRBC # BLD: 0 K/UL (ref 0–0.2)
PLATELET # BLD AUTO: 235 K/UL (ref 150–450)
PMV BLD AUTO: 9.7 FL (ref 9.4–12.3)
POTASSIUM SERPL-SCNC: 4.2 MMOL/L (ref 3.5–5.1)
PROT SERPL-MCNC: 5.9 G/DL (ref 6.3–8.2)
RBC # BLD AUTO: 2.69 M/UL (ref 4.05–5.2)
SODIUM SERPL-SCNC: 142 MMOL/L (ref 136–145)
VANCOMYCIN TROUGH SERPL-MCNC: 17.3 UG/ML (ref 5–20)
WBC # BLD AUTO: 7.3 K/UL (ref 4.3–11.1)

## 2020-01-02 PROCEDURE — 99232 SBSQ HOSP IP/OBS MODERATE 35: CPT | Performed by: INTERNAL MEDICINE

## 2020-01-02 PROCEDURE — 74011250637 HC RX REV CODE- 250/637: Performed by: INTERNAL MEDICINE

## 2020-01-02 PROCEDURE — 83735 ASSAY OF MAGNESIUM: CPT

## 2020-01-02 PROCEDURE — 74011250637 HC RX REV CODE- 250/637: Performed by: NURSE PRACTITIONER

## 2020-01-02 PROCEDURE — 80053 COMPREHEN METABOLIC PANEL: CPT

## 2020-01-02 PROCEDURE — 74011250636 HC RX REV CODE- 250/636: Performed by: NURSE PRACTITIONER

## 2020-01-02 PROCEDURE — 74011250636 HC RX REV CODE- 250/636: Performed by: INTERNAL MEDICINE

## 2020-01-02 PROCEDURE — 80202 ASSAY OF VANCOMYCIN: CPT

## 2020-01-02 PROCEDURE — 74011000258 HC RX REV CODE- 258: Performed by: INTERNAL MEDICINE

## 2020-01-02 PROCEDURE — 65270000029 HC RM PRIVATE

## 2020-01-02 PROCEDURE — 85025 COMPLETE CBC W/AUTO DIFF WBC: CPT

## 2020-01-02 RX ORDER — POLYETHYLENE GLYCOL 3350 17 G/17G
17 POWDER, FOR SOLUTION ORAL DAILY
Status: DISCONTINUED | OUTPATIENT
Start: 2020-01-02 | End: 2020-01-01 | Stop reason: HOSPADM

## 2020-01-02 RX ADMIN — ACETAMINOPHEN: 500 TABLET, FILM COATED ORAL at 21:50

## 2020-01-02 RX ADMIN — PREDNISOLONE ACETATE 2 DROP: 10 SUSPENSION/ DROPS OPHTHALMIC at 06:00

## 2020-01-02 RX ADMIN — FLUDARABINE PHOSPHATE 39 MG: 25 INJECTION, SOLUTION INTRAVENOUS at 14:28

## 2020-01-02 RX ADMIN — POTASSIUM CHLORIDE 20 MEQ: 20 TABLET, EXTENDED RELEASE ORAL at 08:37

## 2020-01-02 RX ADMIN — VANCOMYCIN HYDROCHLORIDE 1250 MG: 10 INJECTION, POWDER, LYOPHILIZED, FOR SOLUTION INTRAVENOUS at 05:21

## 2020-01-02 RX ADMIN — ALLOPURINOL 300 MG: 300 TABLET ORAL at 08:36

## 2020-01-02 RX ADMIN — Medication 2 TABLET: at 08:37

## 2020-01-02 RX ADMIN — ACYCLOVIR 400 MG: 800 TABLET ORAL at 08:37

## 2020-01-02 RX ADMIN — VORICONAZOLE 200 MG: 200 TABLET, FILM COATED ORAL at 20:30

## 2020-01-02 RX ADMIN — VORICONAZOLE 200 MG: 200 TABLET, FILM COATED ORAL at 08:37

## 2020-01-02 RX ADMIN — PREDNISOLONE ACETATE 2 DROP: 10 SUSPENSION/ DROPS OPHTHALMIC at 12:38

## 2020-01-02 RX ADMIN — ACYCLOVIR 400 MG: 800 TABLET ORAL at 18:13

## 2020-01-02 RX ADMIN — CYTARABINE 3900 MG: 2 INJECTION INTRATHECAL; INTRAVENOUS; SUBCUTANEOUS at 20:14

## 2020-01-02 RX ADMIN — VANCOMYCIN HYDROCHLORIDE 1000 MG: 1 INJECTION, POWDER, LYOPHILIZED, FOR SOLUTION INTRAVENOUS at 18:13

## 2020-01-02 RX ADMIN — ENOXAPARIN SODIUM 40 MG: 40 INJECTION SUBCUTANEOUS at 20:30

## 2020-01-02 RX ADMIN — POTASSIUM CHLORIDE 20 MEQ: 20 TABLET, EXTENDED RELEASE ORAL at 18:13

## 2020-01-02 RX ADMIN — PRAVASTATIN SODIUM 10 MG: 20 TABLET ORAL at 21:50

## 2020-01-02 RX ADMIN — LORAZEPAM 1 MG: 1 TABLET ORAL at 21:50

## 2020-01-02 RX ADMIN — DULOXETINE 30 MG: 30 CAPSULE, DELAYED RELEASE ORAL at 08:37

## 2020-01-02 RX ADMIN — POLYETHYLENE GLYCOL 3350 17 G: 17 POWDER, FOR SOLUTION ORAL at 12:37

## 2020-01-02 RX ADMIN — DEXAMETHASONE SODIUM PHOSPHATE 10 MG: 10 INJECTION INTRAMUSCULAR; INTRAVENOUS at 13:50

## 2020-01-02 RX ADMIN — PREDNISOLONE ACETATE 2 DROP: 10 SUSPENSION/ DROPS OPHTHALMIC at 18:13

## 2020-01-02 NOTE — PROGRESS NOTES
Pharmacokinetic Consult to Pharmacist    Priyank Dorsey is a 76 y.o. female being treated for SSTI with vancomycin. Weight: 82.1 kg (181 lb)  Lab Results   Component Value Date/Time    BUN 19 01/02/2020 03:39 AM    Creatinine 0.59 (L) 01/02/2020 03:39 AM    WBC 7.3 01/02/2020 03:39 AM    Procalcitonin 0.1 09/21/2019 06:50 PM    Lactic Acid (POC) 0.67 09/21/2019 08:51 PM      Estimated Creatinine Clearance: 75.5 mL/min (A) (by C-G formula based on SCr of 0.59 mg/dL (L)). Lab Results   Component Value Date/Time    Vancomycin,trough 17.3 01/02/2020 03:39 AM       Day 3 of vancomycin. Goal trough is 10-20. Tr therapeutic at 17.1. Decrease to 1g q12h. Will continue to follow patient. Thank you,  Cassidy Mcclure, Pharm. D.   Clinical Pharmacist  [phone]

## 2020-01-02 NOTE — PROGRESS NOTES
Massage Therapy Note    20 minute gentle massage to lower legs and feet using hypoallergenic massage lotion while patient supine. Patient reported pain level of 6/10 before massage and 0/10 after. No follow up planned.     MAC Salamanca

## 2020-01-02 NOTE — PROGRESS NOTES
Problem: Falls - Risk of  Goal: *Absence of Falls  Description  Document Edgar Brunner Fall Risk and appropriate interventions in the flowsheet.   Outcome: Progressing Towards Goal  Note: Fall Risk Interventions:  Mobility Interventions: Patient to call before getting OOB         Medication Interventions: Teach patient to arise slowly    Elimination Interventions: Call light in reach    History of Falls Interventions: Evaluate medications/consider consulting pharmacy

## 2020-01-02 NOTE — PROGRESS NOTES
Pt is well known to the unit is being admitted for completion of Chemo and count recovery. Chart screened by  for discharge planning. No needs identified at this time. Please consult  if any new issues arise. Discharge plan is for pt to return home at time of discharge. CM will continue to follow. Care Management Interventions  PCP Verified by CM:  Yes  Mode of Transport at Discharge: Self  Transition of Care Consult (CM Consult): Discharge Planning  Discharge Durable Medical Equipment: No  Physical Therapy Consult: No  Occupational Therapy Consult: No  Speech Therapy Consult: No  Current Support Network: Own Home, Family Lives Nearby  Confirm Follow Up Transport: Family  Discharge Location  Discharge Placement: Home

## 2020-01-02 NOTE — PROGRESS NOTES
's initial visit with Mrs. Priyank Leavitt and her . Both are known to me from prior admissions. Offered spiritual and emotional support including empathic listening, affirmation of anil & emotions, exploration of coping skills, and prayer as requested. Mrs. Priyank Leavitt and her spouse are hopeful for remission.      Isidra Reyes 68  Board Certified

## 2020-01-02 NOTE — PROGRESS NOTES
END OF SHIFT NOTE:    Intake/Output  No intake/output data recorded. Voiding: YES  Catheter: NO  Drain:              Stool:  0 occurrences. Emesis:  0 occurrences. VITAL SIGNS  Patient Vitals for the past 12 hrs:   Temp Pulse Resp BP SpO2   01/01/20 1937 97.9 °F (36.6 °C) 85 18 166/64 98 %   01/01/20 1543 98.3 °F (36.8 °C) 85 16 146/66 96 %   01/01/20 1154 98 °F (36.7 °C) 94 16 130/74 95 %       Pain Assessment  Pain 1  Pain Scale 1: Numeric (0 - 10) (01/01/20 1420)  Pain Intensity 1: 0 (01/01/20 1420)  Patient Stated Pain Goal: 0 (01/01/20 1420)    Ambulating  Yes      Shift report given to oncoming nurse at the bedside.     Molly Adrian RN

## 2020-01-02 NOTE — PROGRESS NOTES
[facility]        Inpatient Hematology / Oncology Progress Note      Admission Date: 2019  5:52 PM  Reason for Admission/Hospital Course: Admission for antineoplastic chemotherapy [Z51.11]      24 Hour Events:  Afebrile, VSS  Day 3 FLAG  On Vanc for cellulitis/ingrown toenail - appears much improved   at bedside    ROS:  Constitutional: Negative for fever, chills, weakness, malaise, fatigue. CV: Negative for chest pain, palpitations, edema. Respiratory: Negative for dyspnea, cough, wheezing. GI: Negative for nausea, abdominal pain, diarrhea. 10 point review of systems is otherwise negative with the exception of the elements mentioned above in the HPI. No Known Allergies    OBJECTIVE:  Patient Vitals for the past 8 hrs:   BP Temp Pulse Resp SpO2   20 0743 135/70 97.4 °F (36.3 °C) 82 18 96 %   20 0255 131/61 97.9 °F (36.6 °C) 79 18 97 %     Temp (24hrs), Av.9 °F (36.6 °C), Min:97.4 °F (36.3 °C), Max:98.3 °F (36.8 °C)     0701 -  1900  In: 1008 [P. O.:600; I.V.:408]  Out: 500 [Urine:500]    Physical Exam:  Constitutional: Well developed, well nourished female in no acute distress, sitting comfortably on the bedside couch. HEENT: Normocephalic and atraumatic. Oropharynx is clear, mucous membranes are moist.  Extraocular muscles are intact. Sclerae anicteric. Neck supple without JVD. No thyromegaly present. Skin Warm and dry. No bruising and no rash noted. No pallor. +Erythematous L first toe with ingrown toenail - appears much improved   Respiratory Lungs are clear to auscultation bilaterally without wheezes, rales or rhonchi, normal air exchange without accessory muscle use. CVS Normal rate, regular rhythm and normal S1 and S2. No murmurs, gallops, or rubs. Abdomen Soft, nontender and nondistended, normoactive bowel sounds. No palpable mass. No hepatosplenomegaly.    Neuro Grossly nonfocal with no obvious sensory or motor deficits. MSK Normal range of motion in general.  No edema and no tenderness. Psych Appropriate mood and affect.         Labs:      Recent Labs     01/02/20  0339 01/01/20  0451 12/31/19  0258   WBC 7.3 4.1* 4.1*   RBC 2.69* 2.75* 2.71*   HGB 8.6* 9.0* 8.5*   HCT 26.8* 27.6* 27.1*   MCV 99.6* 100.4* 100.0*   MCH 32.0 32.7 31.4   MCHC 32.1 32.6 31.4   RDW 20.8* 20.9* 20.9*    234 215   GRANS 96* 88* 32*   LYMPH 1* 5* 12*   MONOS 3* 6 46*   EOS 0* 0* 8*   BASOS 0 1 1   IG 0 1 1   DF AUTOMATED AUTOMATED AUTOMATED   ANEU 7.0 3.6 1.3*   ABL 0.1* 0.2* 0.5   ABM 0.2 0.3 1.9*   ALEKSANDR 0.0 0.0 0.3   ABB 0.0 0.0 0.0   AIG 0.0 0.0 0.0        Recent Labs     01/02/20  0339 01/01/20  0451 12/31/19  0258    141 143   K 4.2 4.3 3.7   * 109* 109*   CO2 26 27 28   AGAP 6* 5* 6*   * 146* 97   BUN 19 14 11   CREA 0.59* 0.61 0.66   GFRAA >60 >60 >60   GFRNA >60 >60 >60   CA 9.3 9.7 10.0   SGOT 20 18 20    105 100   TP 5.9* 6.5 6.3   ALB 3.0* 2.9* 2.9*   GLOB 2.9 3.6* 3.4   AGRAT 1.0* 0.8* 0.9*   MG 2.0 2.0 1.9         Imaging: n/a    Medications:  Current Facility-Administered Medications   Medication Dose Route Frequency    polyethylene glycol (MIRALAX) packet 17 g  17 g Oral DAILY    vancomycin (VANCOCIN) 1,000 mg in 0.9% sodium chloride (MBP/ADV) 250 mL  1,000 mg IntraVENous Q12H    dexamethasone (DECADRON) injection 10 mg  10 mg IntraVENous Q24H    LORazepam (ATIVAN) injection 0.5 mg  0.5 mg IntraVENous Q6H PRN    prednisoLONE acetate (PRED FORTE) 1 % ophthalmic suspension 2 Drop  2 Drop Both Eyes Q6H    fludarabine (FLUDARA) 39 mg in 0.9% sodium chloride 100 mL chemo infusion  20 mg/m2 (Treatment Plan Recorded) IntraVENous Q24H    cytarabine (CYTOSAR) 3,900 mg in 0.9% sodium chloride 500 mL chemo infusion  2 g/m2 (Treatment Plan Recorded) IntraVENous Q24H    [START ON 1/6/2020] tbo-filgrastim (GRANIX) injection 480 mcg  480 mcg SubCUTAneous QHS    vit C,B-Fs-gqlto-lutein-zeaxan (PRESERVISION AREDS-2) capsule 1 Cap (Patient Supplied)  1 Cap Oral DAILY    acetaminophen/diphenhydrAMINE (TYLENOL PM EXT STR) 500/25 mg   Oral QHS    acyclovir (ZOVIRAX) tablet 400 mg  400 mg Oral BID    allopurinol (ZYLOPRIM) tablet 300 mg  300 mg Oral DAILY    cholecalciferol (VITAMIN D3) (400 Units /10 mcg) tablet 2 Tab  800 Units Oral DAILY    DULoxetine (CYMBALTA) capsule 30 mg  30 mg Oral DAILY    lidocaine-prilocaine (EMLA) 2.5-2.5 % cream   Topical PRN    LORazepam (ATIVAN) tablet 1 mg  1 mg Oral QHS    potassium chloride (K-DUR, KLOR-CON) SR tablet 20 mEq  20 mEq Oral BID    pravastatin (PRAVACHOL) tablet 10 mg  10 mg Oral QHS    voriconazole (VFEND) tablet 200 mg  200 mg Oral Q12H    zolpidem (AMBIEN) tablet 5 mg  5 mg Oral QHS PRN    ondansetron (ZOFRAN) injection 4 mg  4 mg IntraVENous Q4H PRN    prochlorperazine (COMPAZINE) with saline injection 5 mg  5 mg IntraVENous Q6H PRN    HYDROcodone-acetaminophen (NORCO) 5-325 mg per tablet 1 Tab  1 Tab Oral Q6H PRN    morphine injection 2 mg  2 mg IntraVENous Q4H PRN    0.9% sodium chloride infusion  75 mL/hr IntraVENous CONTINUOUS    enoxaparin (LOVENOX) injection 40 mg  40 mg SubCUTAneous Q24H         ASSESSMENT:    Problem List  Date Reviewed: 12/19/2019          Codes Class Noted    Febrile neutropenia ([facility] ICD-10-CM: D70.9, R50.81  ICD-9-CM: 288.00, 780.61  9/21/2019        Pancytopenia due to antineoplastic chemotherapy [facility]) ICD-10-CM: Q03.309, T45.1X5A  ICD-9-CM: 284.11, E933.1  6/12/2019        Cellulitis of neck ICD-10-CM: K63.750  ICD-9-CM: 682.1  6/12/2019        Immunocompromised status associated with infection ICD-10-CM: B99.9  ICD-9-CM: 136.9  6/12/2019        Port or reservoir infection ICD-10-CM: D51.916E  ICD-9-CM: 999.33  6/12/2019        Acute myeloid leukemia not having achieved remission [facility]) ICD-10-CM: C92.00  ICD-9-CM: 205.00  5/9/2019        Admission for antineoplastic chemotherapy ICD-10-CM: Z51.11  ICD-9-CM: V58.11  5/5/2019        AML (acute myeloblastic leukemia) ([facility] ICD-10-CM: C92.00  ICD-9-CM: 205.00  4/28/2019        Weakness generalized ICD-10-CM: R53.1  ICD-9-CM: 780.79  4/28/2019        Pancytopenia ([facility] ICD-10-CM: A32.450  ICD-9-CM: 284.19  4/28/2019        Thrombocytopenia ([facility] ICD-10-CM: D69.6  ICD-9-CM: 287.5  4/27/2019            Ms. Kell Mcrae is a 76 y.o. female admitted on 12/30/2019 with a primary diagnosis of There were no encounter diagnoses. .       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 Subsequently on Decitabine plus Gilteritinib x 3 cycles w/ persistent disease. S/p FLAG-VENITA 11/19 w persistent disease though improvement in blast percentage as well as cytopenias. FLAG-VENITA course was complicated by neutropenic fever and E fecalis/alpha strep bacteremia requiring abx course,  ID transitioned to oral Augmentin at discharge which she completed, port was retained. Counts have clearly improved with improvement in [address] and thrombocytopenia however BM biopsy with persistent disease open (cellularity described at 30% with 10-20% residual blasts on IHC). Results shared with patient, various options discussed moving forward. For now she will try to enjoy holidays/[place] with her family and return next week for likely reinduction. Will consider intermediate dose rivera-C based regimen. Seen today 12/30/2019: Enjoyed [place] at home. Some fatigue persists. Appetite slowly improving. Discussed various options again, counseled clearly improved: Various options discussed including intermediate dose rivera-C versus reinduction with modified FLAG (will hold off on [place] Sol given past anthracycline exposure), patient has opted for reinduction with FL AG which is reasonable. Denies any recent fevers or chills, okay to admit for reinduction. Continue prophylactic antibiotics. Will repeat BM biopsy on count recovery.   She will stay in house until counts recover. PLAN:  AML  - admit for re-induction w modified dose FLAG  12/31 Day 1 FLAG. 1/2 Day 3 FLAG. Tolerating treatment well. Ingrown toenail  12/31 Was going to defer treatment pending podiatrist consult, but no podiatrist will come see pt while IP. Gave pt the option of being discharged to see podiatrist prior to proceeding with treatment vs starting treatment along with Vanc. Pt prefers to start treatment. Vanc ordered. 1/1 on Vanc. Toe covered with bandaid today when seen. 1/2  Toe appears much improved. Con't Vanc. Continue home meds  Prophylactic Antibx: Acyclovir, Voriconazole  Alexander SOPs  Lovenox for DVT prophylaxis (hold for plt <50k)    Disposition:  Discharge pending completion of chemotherapy and count recovery. Will need repeat BMbx on count recovery. RTC within week of discharge. Goals and plan of care reviewed with the patient. All questions answered to the best of our ability.             Jasvir Cheng NP   [facility]ematology [facility] On[facility]  [address] [place] [address]  Office : [phone]  Fax : [phone]

## 2020-01-03 LAB
ALBUMIN SERPL-MCNC: 1.5 G/DL (ref 3.2–4.6)
ALBUMIN/GLOB SERPL: 0.3 {RATIO} (ref 1.2–3.5)
ALP SERPL-CCNC: 96 U/L (ref 50–136)
ALT SERPL-CCNC: 30 U/L (ref 12–65)
ANION GAP SERPL CALC-SCNC: 10 MMOL/L (ref 7–16)
AST SERPL-CCNC: 26 U/L (ref 15–37)
BASOPHILS # BLD: 0 K/UL (ref 0–0.2)
BASOPHILS NFR BLD: 0 % (ref 0–2)
BILIRUB SERPL-MCNC: 0.5 MG/DL (ref 0.2–1.1)
BUN SERPL-MCNC: 16 MG/DL (ref 8–23)
CALCIUM SERPL-MCNC: 9.4 MG/DL (ref 8.3–10.4)
CHLORIDE SERPL-SCNC: 109 MMOL/L (ref 98–107)
CO2 SERPL-SCNC: 23 MMOL/L (ref 21–32)
CREAT SERPL-MCNC: 0.57 MG/DL (ref 0.6–1)
DIFFERENTIAL METHOD BLD: ABNORMAL
EOSINOPHIL # BLD: 0 K/UL (ref 0–0.8)
EOSINOPHIL NFR BLD: 0 % (ref 0.5–7.8)
ERYTHROCYTE [DISTWIDTH] IN BLOOD BY AUTOMATED COUNT: 20.4 % (ref 11.9–14.6)
GLOBULIN SER CALC-MCNC: 4.7 G/DL (ref 2.3–3.5)
GLUCOSE SERPL-MCNC: 131 MG/DL (ref 65–100)
HCT VFR BLD AUTO: 26.9 % (ref 35.8–46.3)
HGB BLD-MCNC: 8.8 G/DL (ref 11.7–15.4)
IMM GRANULOCYTES # BLD AUTO: 0 K/UL (ref 0–0.5)
IMM GRANULOCYTES NFR BLD AUTO: 0 % (ref 0–5)
LDH SERPL L TO P-CCNC: 287 U/L (ref 110–210)
LYMPHOCYTES # BLD: 0 K/UL (ref 0.5–4.6)
LYMPHOCYTES NFR BLD: 0 % (ref 13–44)
MAGNESIUM SERPL-MCNC: 2.2 MG/DL (ref 1.8–2.4)
MCH RBC QN AUTO: 32.5 PG (ref 26.1–32.9)
MCHC RBC AUTO-ENTMCNC: 32.7 G/DL (ref 31.4–35)
MCV RBC AUTO: 99.3 FL (ref 79.6–97.8)
MONOCYTES # BLD: 0.1 K/UL (ref 0.1–1.3)
MONOCYTES NFR BLD: 1 % (ref 4–12)
NEUTS SEG # BLD: 5.1 K/UL (ref 1.7–8.2)
NEUTS SEG NFR BLD: 98 % (ref 43–78)
NRBC # BLD: 0 K/UL (ref 0–0.2)
PLATELET # BLD AUTO: 233 K/UL (ref 150–450)
PMV BLD AUTO: 9.8 FL (ref 9.4–12.3)
POTASSIUM SERPL-SCNC: 4 MMOL/L (ref 3.5–5.1)
PROT SERPL-MCNC: 6.2 G/DL (ref 6.3–8.2)
RBC # BLD AUTO: 2.71 M/UL (ref 4.05–5.2)
SODIUM SERPL-SCNC: 142 MMOL/L (ref 136–145)
URATE SERPL-MCNC: 4.9 MG/DL (ref 2.6–6)
WBC # BLD AUTO: 5.2 K/UL (ref 4.3–11.1)

## 2020-01-03 PROCEDURE — 80053 COMPREHEN METABOLIC PANEL: CPT

## 2020-01-03 PROCEDURE — 99231 SBSQ HOSP IP/OBS SF/LOW 25: CPT | Performed by: INTERNAL MEDICINE

## 2020-01-03 PROCEDURE — 74011000258 HC RX REV CODE- 258: Performed by: INTERNAL MEDICINE

## 2020-01-03 PROCEDURE — 74011250637 HC RX REV CODE- 250/637: Performed by: NURSE PRACTITIONER

## 2020-01-03 PROCEDURE — 83615 LACTATE (LD) (LDH) ENZYME: CPT

## 2020-01-03 PROCEDURE — 84550 ASSAY OF BLOOD/URIC ACID: CPT

## 2020-01-03 PROCEDURE — 74011250636 HC RX REV CODE- 250/636: Performed by: NURSE PRACTITIONER

## 2020-01-03 PROCEDURE — 85025 COMPLETE CBC W/AUTO DIFF WBC: CPT

## 2020-01-03 PROCEDURE — 74011250636 HC RX REV CODE- 250/636: Performed by: INTERNAL MEDICINE

## 2020-01-03 PROCEDURE — 74011250637 HC RX REV CODE- 250/637: Performed by: INTERNAL MEDICINE

## 2020-01-03 PROCEDURE — 83735 ASSAY OF MAGNESIUM: CPT

## 2020-01-03 PROCEDURE — 65270000029 HC RM PRIVATE

## 2020-01-03 PROCEDURE — 36593 DECLOT VASCULAR DEVICE: CPT

## 2020-01-03 RX ORDER — ZOLPIDEM TARTRATE 5 MG/1
10 TABLET ORAL
Status: DISCONTINUED | OUTPATIENT
Start: 2020-01-03 | End: 2020-01-01 | Stop reason: HOSPADM

## 2020-01-03 RX ADMIN — Medication 2 TABLET: at 08:01

## 2020-01-03 RX ADMIN — ZOLPIDEM TARTRATE 10 MG: 5 TABLET ORAL at 21:26

## 2020-01-03 RX ADMIN — DEXAMETHASONE SODIUM PHOSPHATE 10 MG: 10 INJECTION INTRAMUSCULAR; INTRAVENOUS at 13:32

## 2020-01-03 RX ADMIN — VANCOMYCIN HYDROCHLORIDE 1000 MG: 1 INJECTION, POWDER, LYOPHILIZED, FOR SOLUTION INTRAVENOUS at 17:08

## 2020-01-03 RX ADMIN — PRAVASTATIN SODIUM 10 MG: 20 TABLET ORAL at 21:26

## 2020-01-03 RX ADMIN — PREDNISOLONE ACETATE 2 DROP: 10 SUSPENSION/ DROPS OPHTHALMIC at 22:00

## 2020-01-03 RX ADMIN — ENOXAPARIN SODIUM 40 MG: 40 INJECTION SUBCUTANEOUS at 21:26

## 2020-01-03 RX ADMIN — FLUDARABINE PHOSPHATE 39 MG: 25 INJECTION, SOLUTION INTRAVENOUS at 16:04

## 2020-01-03 RX ADMIN — VANCOMYCIN HYDROCHLORIDE 1000 MG: 1 INJECTION, POWDER, LYOPHILIZED, FOR SOLUTION INTRAVENOUS at 05:38

## 2020-01-03 RX ADMIN — VORICONAZOLE 200 MG: 200 TABLET, FILM COATED ORAL at 21:26

## 2020-01-03 RX ADMIN — ACYCLOVIR 400 MG: 800 TABLET ORAL at 17:08

## 2020-01-03 RX ADMIN — LORAZEPAM 1 MG: 1 TABLET ORAL at 21:26

## 2020-01-03 RX ADMIN — LORAZEPAM 0.5 MG: 2 INJECTION INTRAMUSCULAR; INTRAVENOUS at 01:53

## 2020-01-03 RX ADMIN — ALLOPURINOL 300 MG: 300 TABLET ORAL at 08:01

## 2020-01-03 RX ADMIN — VORICONAZOLE 200 MG: 200 TABLET, FILM COATED ORAL at 08:02

## 2020-01-03 RX ADMIN — CYTARABINE 3900 MG: 2 INJECTION INTRATHECAL; INTRAVENOUS; SUBCUTANEOUS at 20:13

## 2020-01-03 RX ADMIN — ACETAMINOPHEN: 500 TABLET, FILM COATED ORAL at 21:26

## 2020-01-03 RX ADMIN — PREDNISOLONE ACETATE 2 DROP: 10 SUSPENSION/ DROPS OPHTHALMIC at 09:40

## 2020-01-03 RX ADMIN — PREDNISOLONE ACETATE 2 DROP: 10 SUSPENSION/ DROPS OPHTHALMIC at 00:00

## 2020-01-03 RX ADMIN — PREDNISOLONE ACETATE 2 DROP: 10 SUSPENSION/ DROPS OPHTHALMIC at 17:09

## 2020-01-03 RX ADMIN — POTASSIUM CHLORIDE 20 MEQ: 20 TABLET, EXTENDED RELEASE ORAL at 17:08

## 2020-01-03 RX ADMIN — ACYCLOVIR 400 MG: 800 TABLET ORAL at 08:02

## 2020-01-03 RX ADMIN — POTASSIUM CHLORIDE 20 MEQ: 20 TABLET, EXTENDED RELEASE ORAL at 08:02

## 2020-01-03 RX ADMIN — ALTEPLASE 1 MG: KIT at 15:35

## 2020-01-03 RX ADMIN — DULOXETINE 30 MG: 30 CAPSULE, DELAYED RELEASE ORAL at 08:02

## 2020-01-03 NOTE — PROGRESS NOTES
Problem: Falls - Risk of  Goal: *Absence of Falls  Description  Document Shantal Murray Fall Risk and appropriate interventions in the flowsheet.   Outcome: Progressing Towards Goal  Note: Fall Risk Interventions:  Mobility Interventions: Patient to call before getting OOB         Medication Interventions: Patient to call before getting OOB    Elimination Interventions: Call light in reach    History of Falls Interventions: Room close to nurse's station

## 2020-01-03 NOTE — PROGRESS NOTES
[facility] Hematology & Oncology        Inpatient Hematology / Oncology Progress Note      Admission Date: 2019  5:52 PM  Reason for Admission/Hospital Course: Admission for antineoplastic chemotherapy [Z51.11]      24 Hour Events:  Afebrile, VSS  Day 4 FLAG  Doing well, no complaints  On Vanc for cellulitis/ingrown toenail - infx appears to be resolved   at bedside    ROS:  Constitutional: Negative for fever, chills, weakness, malaise, fatigue. CV: Negative for chest pain, palpitations, edema. Respiratory: Negative for dyspnea, cough, wheezing. GI: Negative for nausea, abdominal pain, diarrhea. 10 point review of systems is otherwise negative with the exception of the elements mentioned above in the HPI. No Known Allergies    OBJECTIVE:  Patient Vitals for the past 8 hrs:   BP Temp Pulse Resp SpO2   20 0751 145/57 98.1 °F (36.7 °C) 63 18 95 %     Temp (24hrs), Av.9 °F (36.6 °C), Min:97.8 °F (36.6 °C), Max:98.1 °F (36.7 °C)    No intake/output data recorded. Physical Exam:  Constitutional: Well developed, well nourished female in no acute distress, sitting comfortably on the bedside couch. HEENT: Normocephalic and atraumatic. Oropharynx is clear, mucous membranes are moist.  Extraocular muscles are intact. Sclerae anicteric. Neck supple without JVD. No thyromegaly present. Skin Warm and dry. No bruising and no rash noted. No pallor. +L first toe with ingrown toenail - infx appears much improved   Respiratory Lungs are clear to auscultation bilaterally without wheezes, rales or rhonchi, normal air exchange without accessory muscle use. CVS Normal rate, regular rhythm and normal S1 and S2. No murmurs, gallops, or rubs. Abdomen Soft, nontender and nondistended, normoactive bowel sounds. No palpable mass. No hepatosplenomegaly. Neuro Grossly nonfocal with no obvious sensory or motor deficits. MSK Normal range of motion in general.  No edema and no tenderness. Psych Appropriate mood and affect.         Labs:      Recent Labs     01/03/20 0238 01/02/20  0339 01/01/20  0451   WBC 5.2 7.3 4.1*   RBC 2.71* 2.69* 2.75*   HGB 8.8* 8.6* 9.0*   HCT 26.9* 26.8* 27.6*   MCV 99.3* 99.6* 100.4*   MCH 32.5 32.0 32.7   MCHC 32.7 32.1 32.6   RDW 20.4* 20.8* 20.9*    235 234   GRANS 98* 96* 88*   LYMPH 0* 1* 5*   MONOS 1* 3* 6   EOS 0* 0* 0*   BASOS 0 0 1   IG 0 0 1   DF AUTOMATED AUTOMATED AUTOMATED   ANEU 5.1 7.0 3.6   ABL 0.0* 0.1* 0.2*   ABM 0.1 0.2 0.3   ALEKSANDR 0.0 0.0 0.0   ABB 0.0 0.0 0.0   AIG 0.0 0.0 0.0        Recent Labs     01/03/20 0238 01/02/20 0339 01/01/20  0451    142 141   K 4.0 4.2 4.3   * 110* 109*   CO2 23 26 27   AGAP 10 6* 5*   * 148* 146*   BUN 16 19 14   CREA 0.57* 0.59* 0.61   GFRAA >60 >60 >60   GFRNA >60 >60 >60   CA 9.4 9.3 9.7   SGOT 26 20 18   AP 96 100 105   TP 6.2* 5.9* 6.5   ALB 1.5* 3.0* 2.9*   GLOB 4.7* 2.9 3.6*   AGRAT 0.3* 1.0* 0.8*   MG 2.2 2.0 2.0         Imaging: n/a    Medications:  Current Facility-Administered Medications   Medication Dose Route Frequency    polyethylene glycol (MIRALAX) packet 17 g  17 g Oral DAILY    vancomycin (VANCOCIN) 1,000 mg in 0.9% sodium chloride (MBP/ADV) 250 mL  1,000 mg IntraVENous Q12H    dexamethasone (DECADRON) injection 10 mg  10 mg IntraVENous Q24H    LORazepam (ATIVAN) injection 0.5 mg  0.5 mg IntraVENous Q6H PRN    prednisoLONE acetate (PRED FORTE) 1 % ophthalmic suspension 2 Drop  2 Drop Both Eyes Q6H    fludarabine (FLUDARA) 39 mg in 0.9% sodium chloride 100 mL chemo infusion  20 mg/m2 (Treatment Plan Recorded) IntraVENous Q24H    cytarabine (CYTOSAR) 3,900 mg in 0.9% sodium chloride 500 mL chemo infusion  2 g/m2 (Treatment Plan Recorded) IntraVENous Q24H    [START ON 1/6/2020] tbo-filgrastim (GRANIX) injection 480 mcg  480 mcg SubCUTAneous QHS    vit C,N-Ys-eclcu-lutein-zeaxan (PRESERVISION AREDS-2) capsule 1 Cap (Patient Supplied)  1 Cap Oral DAILY    acetaminophen/diphenhydrAMINE (TYLENOL PM EXT STR) 500/25 mg   Oral QHS    acyclovir (ZOVIRAX) tablet 400 mg  400 mg Oral BID    allopurinol (ZYLOPRIM) tablet 300 mg  300 mg Oral DAILY    cholecalciferol (VITAMIN D3) (400 Units /10 mcg) tablet 2 Tab  800 Units Oral DAILY    DULoxetine (CYMBALTA) capsule 30 mg  30 mg Oral DAILY    lidocaine-prilocaine (EMLA) 2.5-2.5 % cream   Topical PRN    LORazepam (ATIVAN) tablet 1 mg  1 mg Oral QHS    potassium chloride (K-DUR, KLOR-CON) SR tablet 20 mEq  20 mEq Oral BID    pravastatin (PRAVACHOL) tablet 10 mg  10 mg Oral QHS    voriconazole (VFEND) tablet 200 mg  200 mg Oral Q12H    zolpidem (AMBIEN) tablet 5 mg  5 mg Oral QHS PRN    ondansetron (ZOFRAN) injection 4 mg  4 mg IntraVENous Q4H PRN    prochlorperazine (COMPAZINE) with saline injection 5 mg  5 mg IntraVENous Q6H PRN    HYDROcodone-acetaminophen (NORCO) 5-325 mg per tablet 1 Tab  1 Tab Oral Q6H PRN    morphine injection 2 mg  2 mg IntraVENous Q4H PRN    0.9% sodium chloride infusion  75 mL/hr IntraVENous CONTINUOUS    enoxaparin (LOVENOX) injection 40 mg  40 mg SubCUTAneous Q24H         ASSESSMENT:    Problem List  Date Reviewed: 12/19/2019          Codes Class Noted    Febrile neutropenia ([facility] ICD-10-CM: D70.9, R50.81  ICD-9-CM: 288.00, 780.61  9/21/2019        Pancytopenia due to antineoplastic chemotherapy ([facility].) ICD-10-CM: J28.796, T45.1X5A  ICD-9-CM: 284.11, E933.1  6/12/2019        Cellulitis of neck ICD-10-CM: L61.873  ICD-9-CM: 682.1  6/12/2019        Immunocompromised status associated with infection ICD-10-CM: B99.9  ICD-9-CM: 136.9  6/12/2019        Port or reservoir infection ICD-10-CM: D56.116F  ICD-9-CM: 999.33  6/12/2019        Acute myeloid leukemia not having achieved remission [facility]) ICD-10-CM: C92.00  ICD-9-CM: 205.00  5/9/2019        Admission for antineoplastic chemotherapy ICD-10-CM: Z51.11  ICD-9-CM: V58.11  5/5/2019        AML (acute myeloblastic leukemia) ([facility] ICD-10-CM: C92.00  ICD-9-CM: 205.00  4/28/2019        Weakness generalized ICD-10-CM: R53.1  ICD-9-CM: 780.79  4/28/2019        Pancytopenia ([facility] ICD-10-CM: A59.856  ICD-9-CM: 284.19  4/28/2019        Thrombocytopenia ([facility] ICD-10-CM: D69.6  ICD-9-CM: 287.5  4/27/2019            Ms. Allyssa Howe is a 76 y.o. female admitted on 12/30/2019 with a primary diagnosis of There were no encounter diagnoses. .       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 Subsequently on Decitabine plus Gilteritinib x 3 cycles w/ persistent disease. S/p FLAG-VENITA 11/19 w persistent disease though improvement in blast percentage as well as cytopenias. FLAG-VENITA course was complicated by neutropenic fever and E fecalis/alpha strep bacteremia requiring abx course,  ID transitioned to oral Augmentin at discharge which she completed, port was retained. Counts have clearly improved with improvement in [address] and thrombocytopenia however BM biopsy with persistent disease open (cellularity described at 30% with 10-20% residual blasts on IHC). Results shared with patient, various options discussed moving forward. For now she will try to enjoy holidays/[place] with her family and return next week for likely reinduction. Will consider intermediate dose rivera-C based regimen. Seen today 12/30/2019: Enjoyed [place] at home. Some fatigue persists. Appetite slowly improving. Discussed various options again, counseled clearly improved: Various options discussed including intermediate dose rivera-C versus reinduction with modified FLAG (will hold off on Laverta Massa given past anthracycline exposure), patient has opted for reinduction with FL AG which is reasonable. Denies any recent fevers or chills, okay to admit for reinduction. Continue prophylactic antibiotics. Will repeat BM biopsy on count recovery. She will stay in house until counts recover.     PLAN:  AML  - admit for re-induction w modified dose FLAG  12/31 Day 1 FLAG. 1/3 Day 4 FLAG. Tolerating treatment well. Ingrown toenail  12/31 Was going to defer treatment pending podiatrist consult, but no podiatrist will come see pt while IP. Gave pt the option of being discharged to see podiatrist prior to proceeding with treatment vs starting treatment along with Vanc. Pt prefers to start treatment. Vanc ordered. 1/1 on Vanc. Toe covered with bandaid today when seen. 1/2  Toe appears much improved. Con't Vanc. 1/3 Infx appears resolved. Will continue Vanc through weekend and will likely DC early next week. Continue home meds  Prophylactic Antibx: Acyclovir, Voriconazole  Alexander SOPs  Lovenox for DVT prophylaxis (hold for plt <50k)    Disposition:  Discharge pending completion of chemotherapy and count recovery. Will need repeat BMbx on count recovery. RTC within week of discharge. Goals and plan of care reviewed with the patient. All questions answered to the best of our ability.             Doris Vega NP   [facility] Hematology & Oncology  [address] [address]  Office : [phone]  Fax : [phone]

## 2020-01-04 LAB
ALBUMIN SERPL-MCNC: 2.8 G/DL (ref 3.2–4.6)
ALBUMIN/GLOB SERPL: 0.9 {RATIO} (ref 1.2–3.5)
ALP SERPL-CCNC: 85 U/L (ref 50–136)
ALT SERPL-CCNC: 28 U/L (ref 12–65)
ANION GAP SERPL CALC-SCNC: 7 MMOL/L (ref 7–16)
AST SERPL-CCNC: 20 U/L (ref 15–37)
ATRIAL RATE: 50 BPM
BASOPHILS # BLD: 0 K/UL (ref 0–0.2)
BASOPHILS NFR BLD: 0 % (ref 0–2)
BILIRUB SERPL-MCNC: 0.4 MG/DL (ref 0.2–1.1)
BUN SERPL-MCNC: 21 MG/DL (ref 8–23)
CALCIUM SERPL-MCNC: 9.3 MG/DL (ref 8.3–10.4)
CALCULATED P AXIS, ECG09: 66 DEGREES
CALCULATED R AXIS, ECG10: -20 DEGREES
CALCULATED T AXIS, ECG11: 32 DEGREES
CHLORIDE SERPL-SCNC: 107 MMOL/L (ref 98–107)
CO2 SERPL-SCNC: 26 MMOL/L (ref 21–32)
CREAT SERPL-MCNC: 0.55 MG/DL (ref 0.6–1)
DIAGNOSIS, 93000: NORMAL
DIFFERENTIAL METHOD BLD: ABNORMAL
EOSINOPHIL # BLD: 0 K/UL (ref 0–0.8)
EOSINOPHIL NFR BLD: 0 % (ref 0.5–7.8)
ERYTHROCYTE [DISTWIDTH] IN BLOOD BY AUTOMATED COUNT: 19.5 % (ref 11.9–14.6)
GLOBULIN SER CALC-MCNC: 3.1 G/DL (ref 2.3–3.5)
GLUCOSE SERPL-MCNC: 146 MG/DL (ref 65–100)
HCT VFR BLD AUTO: 24.6 % (ref 35.8–46.3)
HGB BLD-MCNC: 8.1 G/DL (ref 11.7–15.4)
IMM GRANULOCYTES # BLD AUTO: 0 K/UL (ref 0–0.5)
IMM GRANULOCYTES NFR BLD AUTO: 0 % (ref 0–5)
LDH SERPL L TO P-CCNC: 196 U/L (ref 110–210)
LYMPHOCYTES # BLD: 0 K/UL (ref 0.5–4.6)
LYMPHOCYTES NFR BLD: 0 % (ref 13–44)
MAGNESIUM SERPL-MCNC: 2 MG/DL (ref 1.8–2.4)
MCH RBC QN AUTO: 32.1 PG (ref 26.1–32.9)
MCHC RBC AUTO-ENTMCNC: 32.9 G/DL (ref 31.4–35)
MCV RBC AUTO: 97.6 FL (ref 79.6–97.8)
MONOCYTES # BLD: 0 K/UL (ref 0.1–1.3)
MONOCYTES NFR BLD: 0 % (ref 4–12)
NEUTS SEG # BLD: 3.3 K/UL (ref 1.7–8.2)
NEUTS SEG NFR BLD: 100 % (ref 43–78)
NRBC # BLD: 0 K/UL (ref 0–0.2)
P-R INTERVAL, ECG05: 182 MS
PLATELET # BLD AUTO: 192 K/UL (ref 150–450)
PLATELET COMMENTS,PCOM: ADEQUATE
PMV BLD AUTO: 9.6 FL (ref 9.4–12.3)
POTASSIUM SERPL-SCNC: 4.1 MMOL/L (ref 3.5–5.1)
PROT SERPL-MCNC: 5.9 G/DL (ref 6.3–8.2)
Q-T INTERVAL, ECG07: 474 MS
QRS DURATION, ECG06: 106 MS
QTC CALCULATION (BEZET), ECG08: 432 MS
RBC # BLD AUTO: 2.52 M/UL (ref 4.05–5.2)
RBC MORPH BLD: ABNORMAL
SODIUM SERPL-SCNC: 140 MMOL/L (ref 136–145)
URATE SERPL-MCNC: 4.2 MG/DL (ref 2.6–6)
VENTRICULAR RATE, ECG03: 50 BPM
WBC # BLD AUTO: 3.3 K/UL (ref 4.3–11.1)

## 2020-01-04 PROCEDURE — 74011250636 HC RX REV CODE- 250/636: Performed by: NURSE PRACTITIONER

## 2020-01-04 PROCEDURE — 83735 ASSAY OF MAGNESIUM: CPT

## 2020-01-04 PROCEDURE — 93005 ELECTROCARDIOGRAM TRACING: CPT | Performed by: NURSE PRACTITIONER

## 2020-01-04 PROCEDURE — 99232 SBSQ HOSP IP/OBS MODERATE 35: CPT | Performed by: INTERNAL MEDICINE

## 2020-01-04 PROCEDURE — 36591 DRAW BLOOD OFF VENOUS DEVICE: CPT

## 2020-01-04 PROCEDURE — 36593 DECLOT VASCULAR DEVICE: CPT

## 2020-01-04 PROCEDURE — 84550 ASSAY OF BLOOD/URIC ACID: CPT

## 2020-01-04 PROCEDURE — 83615 LACTATE (LD) (LDH) ENZYME: CPT

## 2020-01-04 PROCEDURE — 74011250636 HC RX REV CODE- 250/636: Performed by: INTERNAL MEDICINE

## 2020-01-04 PROCEDURE — 65270000029 HC RM PRIVATE

## 2020-01-04 PROCEDURE — 80053 COMPREHEN METABOLIC PANEL: CPT

## 2020-01-04 PROCEDURE — 85025 COMPLETE CBC W/AUTO DIFF WBC: CPT

## 2020-01-04 PROCEDURE — 77030003560 HC NDL HUBR BARD -A

## 2020-01-04 PROCEDURE — 74011000258 HC RX REV CODE- 258: Performed by: INTERNAL MEDICINE

## 2020-01-04 PROCEDURE — 74011250637 HC RX REV CODE- 250/637: Performed by: NURSE PRACTITIONER

## 2020-01-04 RX ADMIN — POTASSIUM CHLORIDE 20 MEQ: 20 TABLET, EXTENDED RELEASE ORAL at 08:16

## 2020-01-04 RX ADMIN — ZOLPIDEM TARTRATE 10 MG: 5 TABLET ORAL at 20:48

## 2020-01-04 RX ADMIN — VORICONAZOLE 200 MG: 200 TABLET, FILM COATED ORAL at 08:16

## 2020-01-04 RX ADMIN — DEXAMETHASONE SODIUM PHOSPHATE 10 MG: 10 INJECTION INTRAMUSCULAR; INTRAVENOUS at 14:20

## 2020-01-04 RX ADMIN — POTASSIUM CHLORIDE 20 MEQ: 20 TABLET, EXTENDED RELEASE ORAL at 17:08

## 2020-01-04 RX ADMIN — VANCOMYCIN HYDROCHLORIDE 1000 MG: 1 INJECTION, POWDER, LYOPHILIZED, FOR SOLUTION INTRAVENOUS at 06:00

## 2020-01-04 RX ADMIN — PRAVASTATIN SODIUM 10 MG: 20 TABLET ORAL at 20:48

## 2020-01-04 RX ADMIN — ONDANSETRON 4 MG: 2 INJECTION INTRAMUSCULAR; INTRAVENOUS at 06:17

## 2020-01-04 RX ADMIN — ALTEPLASE 1 MG: KIT at 12:05

## 2020-01-04 RX ADMIN — PREDNISOLONE ACETATE 2 DROP: 10 SUSPENSION/ DROPS OPHTHALMIC at 04:00

## 2020-01-04 RX ADMIN — LORAZEPAM 1 MG: 1 TABLET ORAL at 20:48

## 2020-01-04 RX ADMIN — ONDANSETRON 4 MG: 2 INJECTION INTRAMUSCULAR; INTRAVENOUS at 11:32

## 2020-01-04 RX ADMIN — VORICONAZOLE 200 MG: 200 TABLET, FILM COATED ORAL at 20:48

## 2020-01-04 RX ADMIN — Medication 2 TABLET: at 08:16

## 2020-01-04 RX ADMIN — PREDNISOLONE ACETATE 2 DROP: 10 SUSPENSION/ DROPS OPHTHALMIC at 15:49

## 2020-01-04 RX ADMIN — ACYCLOVIR 400 MG: 800 TABLET ORAL at 17:08

## 2020-01-04 RX ADMIN — VANCOMYCIN HYDROCHLORIDE 1000 MG: 1 INJECTION, POWDER, LYOPHILIZED, FOR SOLUTION INTRAVENOUS at 17:07

## 2020-01-04 RX ADMIN — ALLOPURINOL 300 MG: 300 TABLET ORAL at 08:16

## 2020-01-04 RX ADMIN — DULOXETINE 30 MG: 30 CAPSULE, DELAYED RELEASE ORAL at 08:16

## 2020-01-04 RX ADMIN — PREDNISOLONE ACETATE 2 DROP: 10 SUSPENSION/ DROPS OPHTHALMIC at 11:24

## 2020-01-04 RX ADMIN — ENOXAPARIN SODIUM 40 MG: 40 INJECTION SUBCUTANEOUS at 18:06

## 2020-01-04 RX ADMIN — ACYCLOVIR 400 MG: 800 TABLET ORAL at 08:16

## 2020-01-04 RX ADMIN — PREDNISOLONE ACETATE 2 DROP: 10 SUSPENSION/ DROPS OPHTHALMIC at 20:49

## 2020-01-04 RX ADMIN — ACETAMINOPHEN: 500 TABLET, FILM COATED ORAL at 20:48

## 2020-01-04 RX ADMIN — FLUDARABINE PHOSPHATE 39 MG: 25 INJECTION, SOLUTION INTRAVENOUS at 15:11

## 2020-01-04 RX ADMIN — CYTARABINE 3900 MG: 2 INJECTION INTRATHECAL; INTRAVENOUS; SUBCUTANEOUS at 20:37

## 2020-01-04 NOTE — PROGRESS NOTES
Problem: Falls - Risk of  Goal: *Absence of Falls  Description  Document Clide Failing Fall Risk and appropriate interventions in the flowsheet.   Outcome: Progressing Towards Goal  Note: Fall Risk Interventions:  Mobility Interventions: Patient to call before getting OOB         Medication Interventions: Teach patient to arise slowly    Elimination Interventions: Call light in reach, Patient to call for help with toileting needs    History of Falls Interventions: Room close to nurse's station         Problem: Patient Education: Go to Patient Education Activity  Goal: Patient/Family Education  Outcome: Progressing Towards Goal     Problem: Chemotherapy, Day 3 to Discharge  Goal: Activity/Safety  Outcome: Progressing Towards Goal  Goal: Consults, if ordered  Outcome: Progressing Towards Goal  Goal: Diagnostic Test/Procedures  Outcome: Progressing Towards Goal  Goal: Nutrition/Diet  Outcome: Progressing Towards Goal  Goal: Discharge Planning  Outcome: Progressing Towards Goal  Goal: Medications  Outcome: Progressing Towards Goal  Goal: Respiratory  Outcome: Progressing Towards Goal  Goal: Treatments/Interventions/Procedures  Outcome: Progressing Towards Goal  Goal: Psychosocial  Outcome: Progressing Towards Goal  Goal: *Optimal pain control at patient's stated goal  Outcome: Progressing Towards Goal  Goal: *Hemodynamically stable  Outcome: Progressing Towards Goal  Goal: *Adequate oxygenation  Outcome: Progressing Towards Goal     Problem: Chemotherapy Discharge Outcomes  Goal: *Verbalizes understanding and describes prescribed diet  Outcome: Progressing Towards Goal  Goal: *Describes follow-up/return visits to physicians  Outcome: Progressing Towards Goal  Goal: *Describes home care/support arrangements established based on need  Outcome: Progressing Towards Goal  Goal: *Describes available resources and support systems  Outcome: Progressing Towards Goal  Goal: *Anxiety reduced or absent  Outcome: Progressing Towards Goal  Goal: *Understands and describes signs and symptoms to report to providers(Stroke Metric)  Outcome: Progressing Towards Goal  Goal: *Hemodynamically stable  Outcome: Progressing Towards Goal  Goal: *Tolerating diet  Outcome: Progressing Towards Goal  Goal: *Verbalizes understanding and describes medication purposes and frequencies  Outcome: Progressing Towards Goal  Goal: *Venous access site with positive blood return and without signs and symptoms of infection  Outcome: Progressing Towards Goal  Goal: *Verbalizes understanding of bowel regimen  Outcome: Progressing Towards Goal

## 2020-01-04 NOTE — PROGRESS NOTES
0640-Bedside report received from April Martinez RN. Resting in bed. No needs voiced. No s/s of acute distress. Tomasz Estevez NP notified of pt's bradycardia with HR of 45. She stated to order EKG. 1205-Pt's port instilled with cath mango for not blood return. 1310-Port still not giving blood return. Will recheck in 45 minutes. 1410-Port giving blood return. 1810-END OF SHIFT NOTE:  Pt's VSS and is in no acute distress. Pt is on D5 FLAG today. Start G-CSF tomorrow and will be inpatient through count recovery. Intake/Output  01/04 0701 - 01/04 1900  In: 720 [P.O.:720]  Out: 1200 [Urine:1200]   Voiding: YES  Catheter: NO  Drain:      Stool:  0 occurrences. Stool Assessment  Stool Color: Brown (01/03/20 1113)  Stool Appearance: Soft(per patient) (01/03/20 1921)  Stool Amount: Medium (01/03/20 1113)  Stool Source/Status: Rectum (01/03/20 1113)    Emesis:  0 occurrences. VITAL SIGNS  Patient Vitals for the past 12 hrs:   Temp Pulse Resp BP SpO2   01/04/20 1445 98.3 °F (36.8 °C) 70 18 131/71 98 %   01/04/20 1049 98.1 °F (36.7 °C) 66 18 134/74 98 %   01/04/20 0634 98 °F (36.7 °C) (!) 45 18 155/60 97 %       Pain Assessment  Pain 1  Pain Scale 1: Numeric (0 - 10) (01/04/20 0818)  Pain Intensity 1: 0 (01/04/20 0818)  Patient Stated Pain Goal: 0 (01/04/20 0341)    Ambulating  Yes    Germania Rai  7971-Bedside shift change report given to April Martinez RN (oncoming nurse) by Liz Brower RN (offgoing nurse). Report included the following information SBAR, Kardex, Intake/Output, MAR and Recent Results.

## 2020-01-04 NOTE — PROGRESS NOTES
[facility] Hematology & Oncology        Inpatient Hematology / Oncology Progress Note      Admission Date: 2019  5:52 PM  Reason for Admission/Hospital Course: Admission for antineoplastic chemotherapy [Z51.11]      24 Hour Events:  Afebrile, bradycardic  Day 5 FLAG  Doing well, no complaints  On Vanc for cellulitis/ingrown toenail - infx appears to be resolved   at bedside    ROS:  Constitutional: Negative for fever, chills, weakness, malaise, fatigue. CV: Negative for chest pain, palpitations, edema. Respiratory: Negative for dyspnea, cough, wheezing. GI: Negative for nausea, abdominal pain, diarrhea. 10 point review of systems is otherwise negative with the exception of the elements mentioned above in the HPI. No Known Allergies    OBJECTIVE:  Patient Vitals for the past 8 hrs:   BP Temp Pulse Resp SpO2   20 0634 155/60 98 °F (36.7 °C) (!) 45 18 97 %   20 0341 138/59 98.2 °F (36.8 °C) (!) 58 18 98 %     Temp (24hrs), Av.1 °F (36.7 °C), Min:97.4 °F (36.3 °C), Max:98.6 °F (37 °C)     0701 -  1900  In: 480 [P.O.:480]  Out: -     Physical Exam:  Constitutional: Well developed, well nourished female in no acute distress, sitting comfortably on the hospital bed. HEENT: Normocephalic and atraumatic. Oropharynx is clear, mucous membranes are moist.  Extraocular muscles are intact. Sclerae anicteric. Neck supple without JVD. No thyromegaly present. Skin Warm and dry. No bruising and no rash noted. No pallor. +L first toe with ingrown toenail - infx appears much improved   Respiratory Lungs are clear to auscultation bilaterally without wheezes, rales or rhonchi, normal air exchange without accessory muscle use. CVS Normal rate, regular rhythm and normal S1 and S2. No murmurs, gallops, or rubs. Abdomen Soft, nontender and nondistended, normoactive bowel sounds. No palpable mass. No hepatosplenomegaly.    Neuro Grossly nonfocal with no obvious sensory or motor deficits. MSK Normal range of motion in general.  No edema and no tenderness. Psych Appropriate mood and affect.         Labs:      Recent Labs     01/04/20 0347 01/03/20 0238 01/02/20  0339   WBC 3.3* 5.2 7.3   RBC 2.52* 2.71* 2.69*   HGB 8.1* 8.8* 8.6*   HCT 24.6* 26.9* 26.8*   MCV 97.6 99.3* 99.6*   MCH 32.1 32.5 32.0   MCHC 32.9 32.7 32.1   RDW 19.5* 20.4* 20.8*    233 235   GRANS 100* 98* 96*   LYMPH 0* 0* 1*   MONOS 0* 1* 3*   EOS 0* 0* 0*   BASOS 0 0 0   IG 0 0 0   DF AUTOMATED AUTOMATED AUTOMATED   ANEU 3.3 5.1 7.0   ABL 0.0* 0.0* 0.1*   ABM 0.0* 0.1 0.2   ALEKSANDR 0.0 0.0 0.0   ABB 0.0 0.0 0.0   AIG 0.0 0.0 0.0        Recent Labs     01/04/20 0347 01/03/20 0238 01/02/20  0339    142 142   K 4.1 4.0 4.2    109* 110*   CO2 26 23 26   AGAP 7 10 6*   * 131* 148*   BUN 21 16 19   CREA 0.55* 0.57* 0.59*   GFRAA >60 >60 >60   GFRNA >60 >60 >60   CA 9.3 9.4 9.3   SGOT 20 26 20   AP 85 96 100   TP 5.9* 6.2* 5.9*   ALB 2.8* 1.5* 3.0*   GLOB 3.1 4.7* 2.9   AGRAT 0.9* 0.3* 1.0*   MG 2.0 2.2 2.0         Imaging: n/a    Medications:  Current Facility-Administered Medications   Medication Dose Route Frequency    [START ON 1/5/2020] Vancomycin Trough Reminder   Other ONCE    zolpidem (AMBIEN) tablet 10 mg  10 mg Oral QHS PRN    polyethylene glycol (MIRALAX) packet 17 g  17 g Oral DAILY    vancomycin (VANCOCIN) 1,000 mg in 0.9% sodium chloride (MBP/ADV) 250 mL  1,000 mg IntraVENous Q12H    dexamethasone (DECADRON) injection 10 mg  10 mg IntraVENous Q24H    LORazepam (ATIVAN) injection 0.5 mg  0.5 mg IntraVENous Q6H PRN    prednisoLONE acetate (PRED FORTE) 1 % ophthalmic suspension 2 Drop  2 Drop Both Eyes Q6H    fludarabine (FLUDARA) 39 mg in 0.9% sodium chloride 100 mL chemo infusion  20 mg/m2 (Treatment Plan Recorded) IntraVENous Q24H    cytarabine (CYTOSAR) 3,900 mg in 0.9% sodium chloride 500 mL chemo infusion  2 g/m2 (Treatment Plan Recorded) IntraVENous Q24H    [START ON 1/6/2020] tbo-filgrastim (GRANIX) injection 480 mcg  480 mcg SubCUTAneous QHS    vit C,Y-Ne-xtiaw-lutein-zeaxan (PRESERVISION AREDS-2) capsule 1 Cap (Patient Supplied)  1 Cap Oral DAILY    acetaminophen/diphenhydrAMINE (TYLENOL PM EXT STR) 500/25 mg   Oral QHS    acyclovir (ZOVIRAX) tablet 400 mg  400 mg Oral BID    allopurinol (ZYLOPRIM) tablet 300 mg  300 mg Oral DAILY    cholecalciferol (VITAMIN D3) (400 Units /10 mcg) tablet 2 Tab  800 Units Oral DAILY    DULoxetine (CYMBALTA) capsule 30 mg  30 mg Oral DAILY    lidocaine-prilocaine (EMLA) 2.5-2.5 % cream   Topical PRN    LORazepam (ATIVAN) tablet 1 mg  1 mg Oral QHS    potassium chloride (K-DUR, KLOR-CON) SR tablet 20 mEq  20 mEq Oral BID    pravastatin (PRAVACHOL) tablet 10 mg  10 mg Oral QHS    voriconazole (VFEND) tablet 200 mg  200 mg Oral Q12H    ondansetron (ZOFRAN) injection 4 mg  4 mg IntraVENous Q4H PRN    prochlorperazine (COMPAZINE) with saline injection 5 mg  5 mg IntraVENous Q6H PRN    HYDROcodone-acetaminophen (NORCO) 5-325 mg per tablet 1 Tab  1 Tab Oral Q6H PRN    morphine injection 2 mg  2 mg IntraVENous Q4H PRN    0.9% sodium chloride infusion  75 mL/hr IntraVENous CONTINUOUS    enoxaparin (LOVENOX) injection 40 mg  40 mg SubCUTAneous Q24H         ASSESSMENT:    Problem List  Date Reviewed: 12/19/2019          Codes Class Noted    Febrile neutropenia ([facility] ICD-10-CM: D70.9, R50.81  ICD-9-CM: 288.00, 780.61  9/21/2019        Pancytopenia due to antineoplastic chemotherapy [facility]) ICD-10-CM: V00.769, T45.1X5A  ICD-9-CM: 284.11, E933.1  6/12/2019        Cellulitis of neck ICD-10-CM: Y02.647  ICD-9-CM: 682.1  6/12/2019        Immunocompromised status associated with infection ICD-10-CM: B99.9  ICD-9-CM: 136.9  6/12/2019        Port or reservoir infection ICD-10-CM: M99.366M  ICD-9-CM: 999.33  6/12/2019        Acute myeloid leukemia not having achieved remission [facility]) ICD-10-CM: C92.00  ICD-9-CM: 205.00  5/9/2019        Admission for antineoplastic chemotherapy ICD-10-CM: Z51.11  ICD-9-CM: V58.11  5/5/2019        AML (acute myeloblastic leukemia) ([facility] ICD-10-CM: C92.00  ICD-9-CM: 205.00  4/28/2019        Weakness generalized ICD-10-CM: R53.1  ICD-9-CM: 780.79  4/28/2019        Pancytopenia ([facility] ICD-10-CM: D72.568  ICD-9-CM: 284.19  4/28/2019        Thrombocytopenia ([facility] ICD-10-CM: D69.6  ICD-9-CM: 287.5  4/27/2019            Ms. Ra Funk is a 76 y.o. female admitted on 12/30/2019 with a primary diagnosis of There were no encounter diagnoses. .       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 Subsequently on Decitabine plus Gilteritinib x 3 cycles w/ persistent disease. S/p FLAG-VENITA 11/19 w persistent disease though improvement in blast percentage as well as cytopenias. FLAG-VENITA course was complicated by neutropenic fever and E fecalis/alpha strep bacteremia requiring abx course,  ID transitioned to oral Augmentin at discharge which she completed, port was retained. Counts have clearly improved with improvement in [address] and thrombocytopenia however BM biopsy with persistent disease open (cellularity described at 30% with 10-20% residual blasts on IHC). Results shared with patient, various options discussed moving forward. For now she will try to enjoy holidays/[place] with her family and return next week for likely reinduction. Will consider intermediate dose rivera-C based regimen. Seen today 12/30/2019: Enjoyed [place] at home. Some fatigue persists. Appetite slowly improving. Discussed various options again, counseled clearly improved: Various options discussed including intermediate dose rivera-C versus reinduction with modified FLAG (will hold off on Rupert Ranks given past anthracycline exposure), patient has opted for reinduction with FL AG which is reasonable. Denies any recent fevers or chills, okay to admit for reinduction. Continue prophylactic antibiotics.   Will repeat BM biopsy on count recovery. She will stay in house until counts recover. PLAN:  AML  - admit for re-induction w modified dose FLAG  12/31 Day 1 FLAG. 1/4 Day 5 FLAG. Tolerating treatment well. Ingrown toenail  12/31 Was going to defer treatment pending podiatrist consult, but no podiatrist will come see pt while IP. Gave pt the option of being discharged to see podiatrist prior to proceeding with treatment vs starting treatment along with Vanc. Pt prefers to start treatment. Vanc ordered. 1/1 on Vanc. Toe covered with bandaid today when seen. 1/2  Toe appears much improved. Con't Vanc. 1/3 Infx appears resolved. Will continue Vanc through weekend and will likely DC early next week. Bradycardia  1/4 Asymptomatic. EKG with sinus irish. Continue home meds  Prophylactic Antibx: Acyclovir, Voriconazole  Alexander SOPs  Lovenox for DVT prophylaxis (hold for plt <50k)    Disposition:  Discharge pending completion of chemotherapy and count recovery. Will need repeat BMbx on count recovery. RTC within week of discharge. Goals and plan of care reviewed with the patient. All questions answered to the best of our ability.             Adalberto Hendrickson NP   [facility] Hematology & Oncology  [address] [address]  Office : [phone]  Fax : [phone]

## 2020-01-04 NOTE — PROGRESS NOTES
Patient rested well overnight, chemo infused without difficulty. Ambien order modified per NP, with good results. Afebrile, ambulating in hallways, good intake and output. Report given to oncoming RN at bedside.

## 2020-01-05 LAB
ALBUMIN SERPL-MCNC: 3 G/DL (ref 3.2–4.6)
ALBUMIN/GLOB SERPL: 1 {RATIO} (ref 1.2–3.5)
ALP SERPL-CCNC: 83 U/L (ref 50–136)
ALT SERPL-CCNC: 26 U/L (ref 12–65)
ANION GAP SERPL CALC-SCNC: 5 MMOL/L (ref 7–16)
AST SERPL-CCNC: 18 U/L (ref 15–37)
BASOPHILS # BLD: 0 K/UL (ref 0–0.2)
BASOPHILS NFR BLD: 0 % (ref 0–2)
BILIRUB SERPL-MCNC: 0.5 MG/DL (ref 0.2–1.1)
BUN SERPL-MCNC: 23 MG/DL (ref 8–23)
CALCIUM SERPL-MCNC: 9.1 MG/DL (ref 8.3–10.4)
CHLORIDE SERPL-SCNC: 106 MMOL/L (ref 98–107)
CO2 SERPL-SCNC: 29 MMOL/L (ref 21–32)
CREAT SERPL-MCNC: 0.64 MG/DL (ref 0.6–1)
DIFFERENTIAL METHOD BLD: ABNORMAL
EOSINOPHIL # BLD: 0 K/UL (ref 0–0.8)
EOSINOPHIL NFR BLD: 0 % (ref 0.5–7.8)
ERYTHROCYTE [DISTWIDTH] IN BLOOD BY AUTOMATED COUNT: 19.4 % (ref 11.9–14.6)
GLOBULIN SER CALC-MCNC: 3.1 G/DL (ref 2.3–3.5)
GLUCOSE SERPL-MCNC: 141 MG/DL (ref 65–100)
HCT VFR BLD AUTO: 25.3 % (ref 35.8–46.3)
HGB BLD-MCNC: 8.6 G/DL (ref 11.7–15.4)
IMM GRANULOCYTES # BLD AUTO: 0 K/UL (ref 0–0.5)
IMM GRANULOCYTES NFR BLD AUTO: 0 % (ref 0–5)
LDH SERPL L TO P-CCNC: 206 U/L (ref 110–210)
LYMPHOCYTES # BLD: 0 K/UL (ref 0.5–4.6)
LYMPHOCYTES NFR BLD: 1 % (ref 13–44)
MAGNESIUM SERPL-MCNC: 2 MG/DL (ref 1.8–2.4)
MCH RBC QN AUTO: 32.8 PG (ref 26.1–32.9)
MCHC RBC AUTO-ENTMCNC: 34 G/DL (ref 31.4–35)
MCV RBC AUTO: 96.6 FL (ref 79.6–97.8)
MONOCYTES # BLD: 0 K/UL (ref 0.1–1.3)
MONOCYTES NFR BLD: 0 % (ref 4–12)
NEUTS SEG # BLD: 1.7 K/UL (ref 1.7–8.2)
NEUTS SEG NFR BLD: 99 % (ref 43–78)
NRBC # BLD: 0 K/UL (ref 0–0.2)
PLATELET # BLD AUTO: 166 K/UL (ref 150–450)
PLATELET COMMENTS,PCOM: ADEQUATE
PMV BLD AUTO: 9.2 FL (ref 9.4–12.3)
POTASSIUM SERPL-SCNC: 4.4 MMOL/L (ref 3.5–5.1)
PROT SERPL-MCNC: 6.1 G/DL (ref 6.3–8.2)
RBC # BLD AUTO: 2.62 M/UL (ref 4.05–5.2)
RBC MORPH BLD: ABNORMAL
SODIUM SERPL-SCNC: 140 MMOL/L (ref 136–145)
URATE SERPL-MCNC: 3.9 MG/DL (ref 2.6–6)
VANCOMYCIN TROUGH SERPL-MCNC: 15.3 UG/ML (ref 5–20)
WBC # BLD AUTO: 1.7 K/UL (ref 4.3–11.1)
WBC MORPH BLD: ABNORMAL

## 2020-01-05 PROCEDURE — 83735 ASSAY OF MAGNESIUM: CPT

## 2020-01-05 PROCEDURE — 80053 COMPREHEN METABOLIC PANEL: CPT

## 2020-01-05 PROCEDURE — 80202 ASSAY OF VANCOMYCIN: CPT

## 2020-01-05 PROCEDURE — 83615 LACTATE (LD) (LDH) ENZYME: CPT

## 2020-01-05 PROCEDURE — 74011250636 HC RX REV CODE- 250/636: Performed by: INTERNAL MEDICINE

## 2020-01-05 PROCEDURE — 84550 ASSAY OF BLOOD/URIC ACID: CPT

## 2020-01-05 PROCEDURE — 36591 DRAW BLOOD OFF VENOUS DEVICE: CPT

## 2020-01-05 PROCEDURE — 99232 SBSQ HOSP IP/OBS MODERATE 35: CPT | Performed by: INTERNAL MEDICINE

## 2020-01-05 PROCEDURE — 74011250636 HC RX REV CODE- 250/636: Performed by: NURSE PRACTITIONER

## 2020-01-05 PROCEDURE — 74011250637 HC RX REV CODE- 250/637: Performed by: NURSE PRACTITIONER

## 2020-01-05 PROCEDURE — 74011250637 HC RX REV CODE- 250/637: Performed by: INTERNAL MEDICINE

## 2020-01-05 PROCEDURE — 85025 COMPLETE CBC W/AUTO DIFF WBC: CPT

## 2020-01-05 PROCEDURE — 65270000029 HC RM PRIVATE

## 2020-01-05 RX ORDER — MAG HYDROX/ALUMINUM HYD/SIMETH 200-200-20
30 SUSPENSION, ORAL (FINAL DOSE FORM) ORAL
Status: DISCONTINUED | OUTPATIENT
Start: 2020-01-05 | End: 2020-01-01 | Stop reason: HOSPADM

## 2020-01-05 RX ADMIN — ACETAMINOPHEN: 500 TABLET, FILM COATED ORAL at 20:17

## 2020-01-05 RX ADMIN — ENOXAPARIN SODIUM 40 MG: 40 INJECTION SUBCUTANEOUS at 18:05

## 2020-01-05 RX ADMIN — VANCOMYCIN HYDROCHLORIDE 1000 MG: 1 INJECTION, POWDER, LYOPHILIZED, FOR SOLUTION INTRAVENOUS at 18:04

## 2020-01-05 RX ADMIN — ONDANSETRON 4 MG: 2 INJECTION INTRAMUSCULAR; INTRAVENOUS at 05:00

## 2020-01-05 RX ADMIN — ACYCLOVIR 400 MG: 800 TABLET ORAL at 18:04

## 2020-01-05 RX ADMIN — ACYCLOVIR 400 MG: 800 TABLET ORAL at 08:42

## 2020-01-05 RX ADMIN — PRAVASTATIN SODIUM 10 MG: 20 TABLET ORAL at 20:17

## 2020-01-05 RX ADMIN — VORICONAZOLE 200 MG: 200 TABLET, FILM COATED ORAL at 11:36

## 2020-01-05 RX ADMIN — ALUMINUM HYDROXIDE, MAGNESIUM HYDROXIDE, AND SIMETHICONE 30 ML: 200; 200; 20 SUSPENSION ORAL at 21:25

## 2020-01-05 RX ADMIN — VANCOMYCIN HYDROCHLORIDE 1000 MG: 1 INJECTION, POWDER, LYOPHILIZED, FOR SOLUTION INTRAVENOUS at 05:53

## 2020-01-05 RX ADMIN — PREDNISOLONE ACETATE 2 DROP: 10 SUSPENSION/ DROPS OPHTHALMIC at 10:35

## 2020-01-05 RX ADMIN — PREDNISOLONE ACETATE 2 DROP: 10 SUSPENSION/ DROPS OPHTHALMIC at 05:00

## 2020-01-05 RX ADMIN — PREDNISOLONE ACETATE 2 DROP: 10 SUSPENSION/ DROPS OPHTHALMIC at 21:26

## 2020-01-05 RX ADMIN — LORAZEPAM 1 MG: 1 TABLET ORAL at 20:18

## 2020-01-05 RX ADMIN — PREDNISOLONE ACETATE 2 DROP: 10 SUSPENSION/ DROPS OPHTHALMIC at 15:00

## 2020-01-05 RX ADMIN — DULOXETINE 30 MG: 30 CAPSULE, DELAYED RELEASE ORAL at 08:42

## 2020-01-05 RX ADMIN — POTASSIUM CHLORIDE 20 MEQ: 20 TABLET, EXTENDED RELEASE ORAL at 18:04

## 2020-01-05 RX ADMIN — ZOLPIDEM TARTRATE 10 MG: 5 TABLET ORAL at 20:16

## 2020-01-05 RX ADMIN — POTASSIUM CHLORIDE 20 MEQ: 20 TABLET, EXTENDED RELEASE ORAL at 08:42

## 2020-01-05 RX ADMIN — VORICONAZOLE 200 MG: 200 TABLET, FILM COATED ORAL at 20:16

## 2020-01-05 RX ADMIN — ALLOPURINOL 300 MG: 300 TABLET ORAL at 08:42

## 2020-01-05 RX ADMIN — ONDANSETRON 4 MG: 2 INJECTION INTRAMUSCULAR; INTRAVENOUS at 19:56

## 2020-01-05 RX ADMIN — Medication 2 TABLET: at 08:42

## 2020-01-05 NOTE — PROGRESS NOTES
END OF SHIFT NOTE:    Patient rested well, chemo complete. No c/o pain. Zofran given x1 for nausea. See MAR. Intake/Output  01/04 1901 - 01/05 0700  In: 2025.4 [P.O.:1000; I.V.:1025.4]  Out: 2400 [Urine:2400]   Voiding: YES  Catheter: NO  Drain:      Stool:  0 occurrences. Stool Assessment  Stool Color: Brown (01/03/20 1113)  Stool Appearance: Soft(per patient) (01/03/20 1921)  Stool Amount: Medium (01/03/20 1113)  Stool Source/Status: Rectum (01/03/20 1113)    Emesis:  0 occurrences. VITAL SIGNS  Patient Vitals for the past 12 hrs:   Temp Pulse Resp BP SpO2   01/05/20 0500 98.1 °F (36.7 °C) (!) 59 17 132/74 96 %   01/04/20 2225 98.3 °F (36.8 °C) (!) 55 18 135/67 95 %   01/04/20 1959 98.3 °F (36.8 °C) (!) 59 18 143/56 98 %       Pain Assessment  Pain 1  Pain Scale 1: Numeric (0 - 10) (01/05/20 0500)  Pain Intensity 1: 0 (01/05/20 0500)  Patient Stated Pain Goal: 0 (01/05/20 0500)    Ambulating  Yes    Shift report given to oncoming nurse at the bedside.     Rosa Elena Rojas

## 2020-01-05 NOTE — PROGRESS NOTES
Problem: Falls - Risk of  Goal: *Absence of Falls  Description  Document Rylan Slater Fall Risk and appropriate interventions in the flowsheet.   Outcome: Progressing Towards Goal  Note: Fall Risk Interventions:  Mobility Interventions: Patient to call before getting OOB         Medication Interventions: Teach patient to arise slowly    Elimination Interventions: Call light in reach, Patient to call for help with toileting needs    History of Falls Interventions: Consult care management for discharge planning, Room close to nurse's station         Problem: Patient Education: Go to Patient Education Activity  Goal: Patient/Family Education  Outcome: Progressing Towards Goal     Problem: Chemotherapy, Day 3 to Discharge  Goal: Activity/Safety  Outcome: Progressing Towards Goal  Goal: Consults, if ordered  Outcome: Progressing Towards Goal  Goal: Diagnostic Test/Procedures  Outcome: Progressing Towards Goal  Goal: Nutrition/Diet  Outcome: Progressing Towards Goal  Goal: Discharge Planning  Outcome: Progressing Towards Goal  Goal: Medications  Outcome: Progressing Towards Goal  Goal: Respiratory  Outcome: Progressing Towards Goal  Goal: Treatments/Interventions/Procedures  Outcome: Progressing Towards Goal  Goal: Psychosocial  Outcome: Progressing Towards Goal  Goal: *Optimal pain control at patient's stated goal  Outcome: Progressing Towards Goal  Goal: *Hemodynamically stable  Outcome: Progressing Towards Goal  Goal: *Adequate oxygenation  Outcome: Progressing Towards Goal     Problem: Chemotherapy Discharge Outcomes  Goal: *Verbalizes understanding and describes prescribed diet  Outcome: Progressing Towards Goal  Goal: *Describes follow-up/return visits to physicians  Outcome: Progressing Towards Goal  Goal: *Describes home care/support arrangements established based on need  Outcome: Progressing Towards Goal  Goal: *Describes available resources and support systems  Outcome: Progressing Towards Goal  Goal: *Anxiety reduced or absent  Outcome: Progressing Towards Goal  Goal: *Understands and describes signs and symptoms to report to providers(Stroke Metric)  Outcome: Progressing Towards Goal  Goal: *Hemodynamically stable  Outcome: Progressing Towards Goal  Goal: *Tolerating diet  Outcome: Progressing Towards Goal  Goal: *Verbalizes understanding and describes medication purposes and frequencies  Outcome: Progressing Towards Goal  Goal: *Venous access site with positive blood return and without signs and symptoms of infection  Outcome: Progressing Towards Goal  Goal: *Verbalizes understanding of bowel regimen  Outcome: Progressing Towards Goal

## 2020-01-05 NOTE — PROGRESS NOTES
Problem: Falls - Risk of  Goal: *Absence of Falls  Description  Document Isela Ochoa Fall Risk and appropriate interventions in the flowsheet.   1/5/2020 1340 by Miguel Angel BERMAN  Outcome: Progressing Towards Goal  Note: Fall Risk Interventions:  Mobility Interventions: Patient to call before getting OOB         Medication Interventions: Teach patient to arise slowly    Elimination Interventions: Call light in reach, Patient to call for help with toileting needs    History of Falls Interventions: Consult care management for discharge planning, Investigate reason for fall, Room close to nurse's station      1/5/2020 0944 by Nuha Reeves  Outcome: Progressing Towards Goal  Note: Fall Risk Interventions:  Mobility Interventions: Patient to call before getting OOB         Medication Interventions: Teach patient to arise slowly    Elimination Interventions: Call light in reach, Patient to call for help with toileting needs    History of Falls Interventions: Consult care management for discharge planning, Investigate reason for fall, Room close to nurse's station      1/5/2020 0641 by Nuha Reeves  Outcome: Progressing Towards Goal  Note: Fall Risk Interventions:  Mobility Interventions: Patient to call before getting OOB         Medication Interventions: Teach patient to arise slowly    Elimination Interventions: Call light in reach, Patient to call for help with toileting needs    History of Falls Interventions: Consult care management for discharge planning, Room close to nurse's station         Problem: Patient Education: Go to Patient Education Activity  Goal: Patient/Family Education  1/5/2020 1340 by Miguel Angel BERMAN  Outcome: Progressing Towards Goal  1/5/2020 0944 by Miguel Angel BERMAN  Outcome: Progressing Towards Goal  1/5/2020 0641 by Miguel Angel BERMAN  Outcome: Progressing Towards Goal     Problem: Chemotherapy, Day 3 to Discharge  Goal: Activity/Safety  1/5/2020 1340 by Joseline Buitrago Germania BERMAN  Outcome: Progressing Towards Goal  1/5/2020 0944 by [place] Bloodgood J  Outcome: Progressing Towards Goal  1/5/2020 0641 by [place] Bloodgood J  Outcome: Progressing Towards Goal  Goal: Consults, if ordered  1/5/2020 1340 by [place] Bloodgood J  Outcome: Progressing Towards Goal  1/5/2020 0944 by [place] Bloodgood J  Outcome: Progressing Towards Goal  1/5/2020 0641 by [place] Bloodgood J  Outcome: Progressing Towards Goal  Goal: Diagnostic Test/Procedures  1/5/2020 1340 by [place] Bloodgood J  Outcome: Progressing Towards Goal  1/5/2020 0944 by [place] Bloodgood J  Outcome: Progressing Towards Goal  1/5/2020 0641 by [place] Bloodgood J  Outcome: Progressing Towards Goal  Goal: Nutrition/Diet  1/5/2020 1340 by [place] Bloodgood J  Outcome: Progressing Towards Goal  1/5/2020 0944 by [place] Bloodgood J  Outcome: Progressing Towards Goal  1/5/2020 0641 by [place] Bloodgood J  Outcome: Progressing Towards Goal  Goal: Discharge Planning  1/5/2020 1340 by [place] Bloodgood J  Outcome: Progressing Towards Goal  1/5/2020 0944 by [place] Bloodgood J  Outcome: Progressing Towards Goal  1/5/2020 0641 by [place] Bloodgood J  Outcome: Progressing Towards Goal  Goal: Medications  1/5/2020 1340 by [place] Bloodgood J  Outcome: Progressing Towards Goal  1/5/2020 0944 by [place] Bloodgood J  Outcome: Progressing Towards Goal  1/5/2020 0641 by [place] Bloodgood J  Outcome: Progressing Towards Goal  Goal: Respiratory  1/5/2020 1340 by [place] Bloodgood J  Outcome: Progressing Towards Goal  1/5/2020 0944 by [place] Bloodgood J  Outcome: Progressing Towards Goal  1/5/2020 0641 by [place] Bloodgood J  Outcome: Progressing Towards Goal  Goal: Treatments/Interventions/Procedures  1/5/2020 1340 by [place] Bloodgood J  Outcome: Progressing Towards Goal  1/5/2020 0944 by [place] Bloodgood J  Outcome: Progressing Towards Goal  1/5/2020 0641 by [place] Bloodgood J  Outcome: Progressing Towards Goal  Goal: Psychosocial  1/5/2020 1340 by Yissel BERMAN  Outcome: Progressing Towards Goal  1/5/2020 0944 by Yissel BERMAN  Outcome: Progressing Towards Goal  1/5/2020 0641 by Yissel BERMAN  Outcome: Progressing Towards Goal  Goal: *Optimal pain control at patient's stated goal  1/5/2020 1340 by Yissel BERMAN  Outcome: Progressing Towards Goal  1/5/2020 0944 by Yissel BERMAN  Outcome: Progressing Towards Goal  1/5/2020 0641 by Yissel BERMAN  Outcome: Progressing Towards Goal  Goal: *Hemodynamically stable  1/5/2020 1340 by Yissel BERMAN  Outcome: Progressing Towards Goal  1/5/2020 0944 by Yissel BERMAN  Outcome: Progressing Towards Goal  1/5/2020 0641 by Yissel BERMAN  Outcome: Progressing Towards Goal  Goal: *Adequate oxygenation  1/5/2020 1340 by Yissel BERMAN  Outcome: Progressing Towards Goal  1/5/2020 0944 by Yissel BERMAN  Outcome: Progressing Towards Goal  1/5/2020 0641 by Yissel BERMAN  Outcome: Progressing Towards Goal     Problem: Chemotherapy Discharge Outcomes  Goal: *Verbalizes understanding and describes prescribed diet  1/5/2020 1340 by Yissel BERMAN  Outcome: Progressing Towards Goal  1/5/2020 0944 by Yissel BERMAN  Outcome: Progressing Towards Goal  1/5/2020 0641 by Yissel BERMAN  Outcome: Progressing Towards Goal  Goal: *Describes follow-up/return visits to physicians  1/5/2020 1340 by Yissel BERMAN  Outcome: Progressing Towards Goal  1/5/2020 0944 by Yissel BERMAN  Outcome: Progressing Towards Goal  1/5/2020 0641 by Yissel BERMAN  Outcome: Progressing Towards Goal  Goal: *Describes home care/support arrangements established based on need  1/5/2020 1340 by Yissel BERMAN  Outcome: Progressing Towards Goal  1/5/2020 0944 by Yissel BERMAN  Outcome: Progressing Towards Goal  1/5/2020 0641 by Yissel BERMAN  Outcome: Progressing Towards Goal  Goal: *Describes available resources and support systems  1/5/2020 1340 by John BERMAN  Outcome: Progressing Towards Goal  1/5/2020 0944 by John BERMAN  Outcome: Progressing Towards Goal  1/5/2020 0641 by John BERMAN  Outcome: Progressing Towards Goal  Goal: *Anxiety reduced or absent  1/5/2020 1340 by John BERMAN  Outcome: Progressing Towards Goal  1/5/2020 0944 by John BERMAN  Outcome: Progressing Towards Goal  1/5/2020 0641 by John BERMAN  Outcome: Progressing Towards Goal  Goal: *Understands and describes signs and symptoms to report to providers(Stroke Metric)  1/5/2020 1340 by John BERMAN  Outcome: Progressing Towards Goal  1/5/2020 0944 by John BERMAN  Outcome: Progressing Towards Goal  1/5/2020 0641 by John BERMAN  Outcome: Progressing Towards Goal  Goal: *Hemodynamically stable  1/5/2020 1340 by John BERMAN  Outcome: Progressing Towards Goal  1/5/2020 0944 by John BERMAN  Outcome: Progressing Towards Goal  1/5/2020 0641 by John BERMAN  Outcome: Progressing Towards Goal  Goal: *Tolerating diet  1/5/2020 1340 by John BERMAN  Outcome: Progressing Towards Goal  1/5/2020 0944 by John BERMAN  Outcome: Progressing Towards Goal  1/5/2020 0641 by John BERMAN  Outcome: Progressing Towards Goal  Goal: *Verbalizes understanding and describes medication purposes and frequencies  1/5/2020 1340 by John BERMAN  Outcome: Progressing Towards Goal  1/5/2020 0944 by John BERMAN  Outcome: Progressing Towards Goal  1/5/2020 0641 by John BERMAN  Outcome: Progressing Towards Goal  Goal: *Venous access site with positive blood return and without signs and symptoms of infection  1/5/2020 1340 by John BERMAN  Outcome: Progressing Towards Goal  1/5/2020 0944 by John BERMAN  Outcome: Progressing Towards Goal  1/5/2020 0641 by John BERMAN  Outcome: Progressing Towards Goal  Goal: *Verbalizes understanding of bowel regimen  1/5/2020 1340 by Ra BERMAN  Outcome: Progressing Towards Goal  1/5/2020 0944 by Ra BERMAN  Outcome: Progressing Towards Goal  1/5/2020 0641 by Ra BERMAN  Outcome: Progressing Towards Goal

## 2020-01-05 NOTE — PROGRESS NOTES
Pharmacokinetic Consult to Pharmacist    Vazquez Appiahonds is a 76 y.o. female being treated for SSTI with Vancomycin. Weight: 81.2 kg (179 lb 1.6 oz)  Lab Results   Component Value Date/Time    BUN 23 01/05/2020 05:01 AM    Creatinine 0.64 01/05/2020 05:01 AM    WBC 1.7 (LL) 01/05/2020 05:01 AM    Procalcitonin 0.1 09/21/2019 06:50 PM    Lactic Acid (POC) 0.67 09/21/2019 08:51 PM      Estimated Creatinine Clearance: 75 mL/min (by C-G formula based on SCr of 0.64 mg/dL). Lab Results   Component Value Date/Time    Vancomycin,trough 15.3 01/05/2020 05:01 AM       Day 5 of vancomycin. Goal trough is 10-20. Trough this morning was therapeutic. Will continue 1000 mg q 12 hours. Will continue to follow patient.       Thank you,  Ean Hermosillo, PharmD  Clinical Pharmacy PGY1 Resident   [phone]

## 2020-01-05 NOTE — PROGRESS NOTES
[facility]        Inpatient Hematology / Oncology Progress Note      Admission Date: 2019  5:52 PM  Reason for Admission/Hospital Course: Admission for antineoplastic chemotherapy [Z51.11]      24 Hour Events:  Afebrile, mildly bradycardic at times  Day 6 FLAG  Doing well, no complaints  On Vanc for cellulitis/ingrown toenail - infx appears to be resolved   at bedside    ROS:  Constitutional: Negative for fever, chills, weakness, malaise, fatigue. CV: Negative for chest pain, palpitations, edema. Respiratory: Negative for dyspnea, cough, wheezing. GI: Negative for nausea, abdominal pain, diarrhea. 10 point review of systems is otherwise negative with the exception of the elements mentioned above in the HPI. No Known Allergies    OBJECTIVE:  Patient Vitals for the past 8 hrs:   BP Temp Pulse Resp SpO2   20 0748 135/68 98.2 °F (36.8 °C) 67 16 98 %   20 0500 132/74 98.1 °F (36.7 °C) (!) 59 17 96 %     Temp (24hrs), Av.2 °F (36.8 °C), Min:98.1 °F (36.7 °C), Max:98.3 °F (36.8 °C)     07 -  1900  In: 258 [I.V.:258]  Out: -     Physical Exam:  Constitutional: Well developed, well nourished female in no acute distress, sitting comfortably on the hospital bed. HEENT: Normocephalic and atraumatic. Oropharynx is clear, mucous membranes are moist.  Extraocular muscles are intact. Sclerae anicteric. Neck supple without JVD. No thyromegaly present. Skin Warm and dry. No bruising and no rash noted. No pallor. Respiratory Lungs are clear to auscultation bilaterally without wheezes, rales or rhonchi, normal air exchange without accessory muscle use. CVS Normal rate, regular rhythm and normal S1 and S2. No murmurs, gallops, or rubs. Abdomen Soft, nontender and nondistended, normoactive bowel sounds. No palpable mass. No hepatosplenomegaly. Neuro Grossly nonfocal with no obvious sensory or motor deficits.    MSK Normal range of motion in general.  No edema and no tenderness. Psych Appropriate mood and affect.         Labs:      Recent Labs     01/05/20  0501 01/04/20  0347 01/03/20  0238   WBC 1.7* 3.3* 5.2   RBC 2.62* 2.52* 2.71*   HGB 8.6* 8.1* 8.8*   HCT 25.3* 24.6* 26.9*   MCV 96.6 97.6 99.3*   MCH 32.8 32.1 32.5   MCHC 34.0 32.9 32.7   RDW 19.4* 19.5* 20.4*    192 233   GRANS 99* 100* 98*   LYMPH 1* 0* 0*   MONOS 0* 0* 1*   EOS 0* 0* 0*   BASOS 0 0 0   IG 0 0 0   DF AUTOMATED AUTOMATED AUTOMATED   ANEU 1.7 3.3 5.1   ABL 0.0* 0.0* 0.0*   ABM 0.0* 0.0* 0.1   ALEKSANDR 0.0 0.0 0.0   ABB 0.0 0.0 0.0   AIG 0.0 0.0 0.0        Recent Labs     01/05/20  0501 01/04/20  0347 01/03/20  0238    140 142   K 4.4 4.1 4.0    107 109*   CO2 29 26 23   AGAP 5* 7 10   * 146* 131*   BUN 23 21 16   CREA 0.64 0.55* 0.57*   GFRAA >60 >60 >60   GFRNA >60 >60 >60   CA 9.1 9.3 9.4   SGOT 18 20 26   AP 83 85 96   TP 6.1* 5.9* 6.2*   ALB 3.0* 2.8* 1.5*   GLOB 3.1 3.1 4.7*   AGRAT 1.0* 0.9* 0.3*   MG 2.0 2.0 2.2         Imaging: n/a    Medications:  Current Facility-Administered Medications   Medication Dose Route Frequency    zolpidem (AMBIEN) tablet 10 mg  10 mg Oral QHS PRN    polyethylene glycol (MIRALAX) packet 17 g  17 g Oral DAILY    vancomycin (VANCOCIN) 1,000 mg in 0.9% sodium chloride (MBP/ADV) 250 mL  1,000 mg IntraVENous Q12H    LORazepam (ATIVAN) injection 0.5 mg  0.5 mg IntraVENous Q6H PRN    prednisoLONE acetate (PRED FORTE) 1 % ophthalmic suspension 2 Drop  2 Drop Both Eyes Q6H    [START ON 1/6/2020] tbo-filgrastim (GRANIX) injection 480 mcg  480 mcg SubCUTAneous QHS    vit C,Z-Qq-wdltu-lutein-zeaxan (PRESERVISION AREDS-2) capsule 1 Cap (Patient Supplied)  1 Cap Oral DAILY    acetaminophen/diphenhydrAMINE (TYLENOL PM EXT STR) 500/25 mg   Oral QHS    acyclovir (ZOVIRAX) tablet 400 mg  400 mg Oral BID    allopurinol (ZYLOPRIM) tablet 300 mg  300 mg Oral DAILY    cholecalciferol (VITAMIN D3) (400 Units /10 mcg) tablet 2 Tab  800 Units Oral DAILY    DULoxetine (CYMBALTA) capsule 30 mg  30 mg Oral DAILY    lidocaine-prilocaine (EMLA) 2.5-2.5 % cream   Topical PRN    LORazepam (ATIVAN) tablet 1 mg  1 mg Oral QHS    potassium chloride (K-DUR, KLOR-CON) SR tablet 20 mEq  20 mEq Oral BID    pravastatin (PRAVACHOL) tablet 10 mg  10 mg Oral QHS    voriconazole (VFEND) tablet 200 mg  200 mg Oral Q12H    ondansetron (ZOFRAN) injection 4 mg  4 mg IntraVENous Q4H PRN    prochlorperazine (COMPAZINE) with saline injection 5 mg  5 mg IntraVENous Q6H PRN    HYDROcodone-acetaminophen (NORCO) 5-325 mg per tablet 1 Tab  1 Tab Oral Q6H PRN    morphine injection 2 mg  2 mg IntraVENous Q4H PRN    0.9% sodium chloride infusion  75 mL/hr IntraVENous CONTINUOUS    enoxaparin (LOVENOX) injection 40 mg  40 mg SubCUTAneous Q24H         ASSESSMENT:    Problem List  Date Reviewed: 12/19/2019          Codes Class Noted    Febrile neutropenia ([facility] ICD-10-CM: D70.9, R50.81  ICD-9-CM: 288.00, 780.61  9/21/2019        Pancytopenia due to antineoplastic chemotherapy [facility]) ICD-10-CM: M01.855, T45.1X5A  ICD-9-CM: 284.11, E933.1  6/12/2019        Cellulitis of neck ICD-10-CM: L48.326  ICD-9-CM: 682.1  6/12/2019        Immunocompromised status associated with infection ICD-10-CM: B99.9  ICD-9-CM: 136.9  6/12/2019        Port or reservoir infection ICD-10-CM: W67.349E  ICD-9-CM: 999.33  6/12/2019        Acute myeloid leukemia not having achieved remission ([facility] ICD-10-CM: C92.00  ICD-9-CM: 205.00  5/9/2019        Admission for antineoplastic chemotherapy ICD-10-CM: Z51.11  ICD-9-CM: V58.11  5/5/2019        AML (acute myeloblastic leukemia) ([facility] ICD-10-CM: C92.00  ICD-9-CM: 205.00  4/28/2019        Weakness generalized ICD-10-CM: R53.1  ICD-9-CM: 780.79  4/28/2019        Pancytopenia ([address] ICD-10-CM: G58.142  ICD-9-CM: 284.19  4/28/2019        Thrombocytopenia ([facility]) ICD-10-CM: D69.6  ICD-9-CM: 287.5  4/27/2019            Ms. Shirin Ambriz is a 76 y.o. female admitted on 12/30/2019 with a primary diagnosis of There were no encounter diagnoses. .       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 Subsequently on Decitabine plus Gilteritinib x 3 cycles w/ persistent disease. S/p FLAG-VENITA 11/19 w persistent disease though improvement in blast percentage as well as cytopenias. FLAG-VENITA course was complicated by neutropenic fever and E fecalis/alpha strep bacteremia requiring abx course,  ID transitioned to oral Augmentin at discharge which she completed, port was retained. Counts have clearly improved with improvement in [address] and thrombocytopenia however BM biopsy with persistent disease open (cellularity described at 30% with 10-20% residual blasts on IHC). Results shared with patient, various options discussed moving forward. For now she will try to enjoy holidays/Temitope with her family and return next week for likely reinduction. Will consider intermediate dose rivera-C based regimen. Seen today 12/30/2019: Enjoyed Temitope at home. Some fatigue persists. Appetite slowly improving. Discussed various options again, counseled clearly improved: Various options discussed including intermediate dose rivera-C versus reinduction with modified FLAG (will hold off on Odin Vigo given past anthracycline exposure), patient has opted for reinduction with FL AG which is reasonable. Denies any recent fevers or chills, okay to admit for reinduction. Continue prophylactic antibiotics. Will repeat BM biopsy on count recovery. She will stay in house until counts recover. PLAN:  AML  - admit for re-induction w modified dose FLAG  12/31 Day 1 FLAG. 1/5 Day 6 FLAG. Tolerating treatment well. Ingrown toenail  12/31 Was going to defer treatment pending podiatrist consult, but no podiatrist will come see pt while IP.   Gave pt the option of being discharged to see podiatrist prior to proceeding with treatment vs starting treatment along with Vanc.  Pt prefers to start treatment. Vanc ordered. 1/1 on Vanc. Toe covered with bandaid today when seen. 1/2  Toe appears much improved. Con't Vanc. 1/3 Infx appears resolved. Will continue Vanc through weekend and will likely DC early next week. 1/4 Infx resolved. Plan to DC Vanc after 7 days of treatment. Bradycardia  1/4 Asymptomatic. EKG with sinus irish. Continue home meds  Prophylactic Antibx: Acyclovir, Voriconazole  Alexander SOPs  Lovenox for DVT prophylaxis (hold for plt <50k)    Disposition:  Discharge pending completion of chemotherapy and count recovery. Will need repeat BMbx on count recovery. RTC within week of discharge. Goals and plan of care reviewed with the patient. All questions answered to the best of our ability.             Kamlesh Guerrero NP   [facility]  [id number] [place] [address]  Office : [phone]  Fax : [phone]

## 2020-01-05 NOTE — PROGRESS NOTES
0641-Bedside report received from Maria De Jesus Hackett RN. Resting in bed. No needs voiced. No s/s of acute distress. 1810-END OF SHIFT NOTE:  Pt's VSS and is in no acute distress. Pt finished FLAG yesterday. Pt here for count recovery. Pt will start granix tomorrow. Intake/Output  01/05 0701 - 01/05 1900  In: 738 [P.O.:480; I.V.:258]  Out: 420 [Urine:420]   Voiding: YES  Catheter: NO  Drain:        Stool:  0 occurrences. Stool Assessment  Stool Color: Jamin Antes (01/03/20 1113)  Stool Appearance: Soft (01/05/20 0850)  Stool Amount: Medium (01/03/20 1113)  Stool Source/Status: Rectum (01/03/20 1113)    Emesis:  0 occurrences. VITAL SIGNS  Patient Vitals for the past 12 hrs:   Temp Pulse Resp BP SpO2   01/05/20 1458 98.2 °F (36.8 °C) 76 16 139/72 98 %   01/05/20 1123 98.3 °F (36.8 °C) 73 16 134/68 98 %   01/05/20 0748 98.2 °F (36.8 °C) 67 16 135/68 98 %   01/05/20 0500 98.1 °F (36.7 °C) (!) 59 17 132/74 96 %       Pain Assessment  Pain 1  Pain Scale 1: Numeric (0 - 10) (01/05/20 1439)  Pain Intensity 1: 0 (01/05/20 1439)  Patient Stated Pain Goal: 0 (01/05/20 1439)    Ambulating  Yes      Germania Rai  1850-Bedside shift change report given to Maria De Jesus Hackett RN (oncoming nurse) by Belen Park RN (offgoing nurse). Report included the following information SBAR, Kardex, Intake/Output, MAR and Recent Results.

## 2020-01-06 LAB
ALBUMIN SERPL-MCNC: 3.1 G/DL (ref 3.2–4.6)
ALBUMIN/GLOB SERPL: 1 {RATIO} (ref 1.2–3.5)
ALP SERPL-CCNC: 86 U/L (ref 50–136)
ALT SERPL-CCNC: 28 U/L (ref 12–65)
ANION GAP SERPL CALC-SCNC: 5 MMOL/L (ref 7–16)
AST SERPL-CCNC: 20 U/L (ref 15–37)
BASOPHILS # BLD: 0 K/UL (ref 0–0.2)
BASOPHILS NFR BLD: 0 % (ref 0–2)
BILIRUB SERPL-MCNC: 0.6 MG/DL (ref 0.2–1.1)
BUN SERPL-MCNC: 23 MG/DL (ref 8–23)
CALCIUM SERPL-MCNC: 9.1 MG/DL (ref 8.3–10.4)
CHLORIDE SERPL-SCNC: 103 MMOL/L (ref 98–107)
CO2 SERPL-SCNC: 29 MMOL/L (ref 21–32)
CREAT SERPL-MCNC: 0.61 MG/DL (ref 0.6–1)
DIFFERENTIAL METHOD BLD: ABNORMAL
EOSINOPHIL # BLD: 0 K/UL (ref 0–0.8)
EOSINOPHIL NFR BLD: 0 % (ref 0.5–7.8)
ERYTHROCYTE [DISTWIDTH] IN BLOOD BY AUTOMATED COUNT: 18.9 % (ref 11.9–14.6)
GLOBULIN SER CALC-MCNC: 3.2 G/DL (ref 2.3–3.5)
GLUCOSE SERPL-MCNC: 102 MG/DL (ref 65–100)
HCT VFR BLD AUTO: 26 % (ref 35.8–46.3)
HGB BLD-MCNC: 8.7 G/DL (ref 11.7–15.4)
IMM GRANULOCYTES # BLD AUTO: 0 K/UL (ref 0–0.5)
IMM GRANULOCYTES NFR BLD AUTO: 0 % (ref 0–5)
LDH SERPL L TO P-CCNC: 196 U/L (ref 110–210)
LYMPHOCYTES # BLD: 0 K/UL (ref 0.5–4.6)
LYMPHOCYTES NFR BLD: 0 % (ref 13–44)
MAGNESIUM SERPL-MCNC: 2.1 MG/DL (ref 1.8–2.4)
MCH RBC QN AUTO: 31.9 PG (ref 26.1–32.9)
MCHC RBC AUTO-ENTMCNC: 33.5 G/DL (ref 31.4–35)
MCV RBC AUTO: 95.2 FL (ref 79.6–97.8)
MONOCYTES # BLD: 0 K/UL (ref 0.1–1.3)
MONOCYTES NFR BLD: 0 % (ref 4–12)
NEUTS SEG # BLD: 0.9 K/UL (ref 1.7–8.2)
NEUTS SEG NFR BLD: 100 % (ref 43–78)
NRBC # BLD: 0 K/UL (ref 0–0.2)
PLATELET # BLD AUTO: 137 K/UL (ref 150–450)
PMV BLD AUTO: 9 FL (ref 9.4–12.3)
POTASSIUM SERPL-SCNC: 4.3 MMOL/L (ref 3.5–5.1)
PROT SERPL-MCNC: 6.3 G/DL (ref 6.3–8.2)
RBC # BLD AUTO: 2.73 M/UL (ref 4.05–5.2)
SODIUM SERPL-SCNC: 137 MMOL/L (ref 136–145)
URATE SERPL-MCNC: 3.9 MG/DL (ref 2.6–6)
WBC # BLD AUTO: 0.9 K/UL (ref 4.3–11.1)

## 2020-01-06 PROCEDURE — 74011250636 HC RX REV CODE- 250/636: Performed by: INTERNAL MEDICINE

## 2020-01-06 PROCEDURE — 36591 DRAW BLOOD OFF VENOUS DEVICE: CPT

## 2020-01-06 PROCEDURE — 99232 SBSQ HOSP IP/OBS MODERATE 35: CPT | Performed by: INTERNAL MEDICINE

## 2020-01-06 PROCEDURE — 83615 LACTATE (LD) (LDH) ENZYME: CPT

## 2020-01-06 PROCEDURE — 85025 COMPLETE CBC W/AUTO DIFF WBC: CPT

## 2020-01-06 PROCEDURE — 80053 COMPREHEN METABOLIC PANEL: CPT

## 2020-01-06 PROCEDURE — 74011250637 HC RX REV CODE- 250/637: Performed by: NURSE PRACTITIONER

## 2020-01-06 PROCEDURE — 84550 ASSAY OF BLOOD/URIC ACID: CPT

## 2020-01-06 PROCEDURE — 83735 ASSAY OF MAGNESIUM: CPT

## 2020-01-06 PROCEDURE — 74011250636 HC RX REV CODE- 250/636: Performed by: NURSE PRACTITIONER

## 2020-01-06 PROCEDURE — 74011000250 HC RX REV CODE- 250: Performed by: NURSE PRACTITIONER

## 2020-01-06 PROCEDURE — 65270000029 HC RM PRIVATE

## 2020-01-06 RX ADMIN — POTASSIUM CHLORIDE 20 MEQ: 20 TABLET, EXTENDED RELEASE ORAL at 08:26

## 2020-01-06 RX ADMIN — PREDNISOLONE ACETATE 2 DROP: 10 SUSPENSION/ DROPS OPHTHALMIC at 10:16

## 2020-01-06 RX ADMIN — ACYCLOVIR 400 MG: 800 TABLET ORAL at 08:25

## 2020-01-06 RX ADMIN — ACETAMINOPHEN: 500 TABLET, FILM COATED ORAL at 21:50

## 2020-01-06 RX ADMIN — ALLOPURINOL 300 MG: 300 TABLET ORAL at 08:25

## 2020-01-06 RX ADMIN — POTASSIUM CHLORIDE 20 MEQ: 20 TABLET, EXTENDED RELEASE ORAL at 17:37

## 2020-01-06 RX ADMIN — DULOXETINE 30 MG: 30 CAPSULE, DELAYED RELEASE ORAL at 08:25

## 2020-01-06 RX ADMIN — PREDNISOLONE ACETATE 2 DROP: 10 SUSPENSION/ DROPS OPHTHALMIC at 17:38

## 2020-01-06 RX ADMIN — PREDNISOLONE ACETATE 2 DROP: 10 SUSPENSION/ DROPS OPHTHALMIC at 21:52

## 2020-01-06 RX ADMIN — PRAVASTATIN SODIUM 10 MG: 20 TABLET ORAL at 21:50

## 2020-01-06 RX ADMIN — Medication 2 TABLET: at 08:26

## 2020-01-06 RX ADMIN — VANCOMYCIN HYDROCHLORIDE 1000 MG: 1 INJECTION, POWDER, LYOPHILIZED, FOR SOLUTION INTRAVENOUS at 17:37

## 2020-01-06 RX ADMIN — VANCOMYCIN HYDROCHLORIDE 1000 MG: 1 INJECTION, POWDER, LYOPHILIZED, FOR SOLUTION INTRAVENOUS at 06:13

## 2020-01-06 RX ADMIN — LORAZEPAM 0.5 MG: 2 INJECTION INTRAMUSCULAR; INTRAVENOUS at 03:18

## 2020-01-06 RX ADMIN — VORICONAZOLE 200 MG: 200 TABLET, FILM COATED ORAL at 08:25

## 2020-01-06 RX ADMIN — PROCHLORPERAZINE EDISYLATE 5 MG: 5 INJECTION INTRAMUSCULAR; INTRAVENOUS at 11:48

## 2020-01-06 RX ADMIN — ONDANSETRON 4 MG: 2 INJECTION INTRAMUSCULAR; INTRAVENOUS at 06:20

## 2020-01-06 RX ADMIN — LORAZEPAM 1 MG: 1 TABLET ORAL at 21:49

## 2020-01-06 RX ADMIN — ZOLPIDEM TARTRATE 10 MG: 5 TABLET ORAL at 21:50

## 2020-01-06 RX ADMIN — ACYCLOVIR 400 MG: 800 TABLET ORAL at 17:37

## 2020-01-06 RX ADMIN — PREDNISOLONE ACETATE 2 DROP: 10 SUSPENSION/ DROPS OPHTHALMIC at 03:26

## 2020-01-06 RX ADMIN — TBO-FILGRASTIM 480 MCG: 480 INJECTION, SOLUTION SUBCUTANEOUS at 21:51

## 2020-01-06 RX ADMIN — VORICONAZOLE 200 MG: 200 TABLET, FILM COATED ORAL at 21:49

## 2020-01-06 NOTE — PROGRESS NOTES
[facility] Hematology & Oncology        Inpatient Hematology / Oncology Progress Note      Admission Date: 2019  5:52 PM  Reason for Admission/Hospital Course: Admission for antineoplastic chemotherapy [Z51.11]      24 Hour Events:  Afebrile, VSS  Day 7 FLAG  Doing well, no complaints  Awaiting count recovery, Granix ordered  On Vanc for cellulitis/ingrown toenail - infx appears to be resolved   at bedside    ROS:  Constitutional: Negative for fever, chills, weakness, malaise, fatigue. CV: Negative for chest pain, palpitations, edema. Respiratory: Negative for dyspnea, cough, wheezing. GI: Negative for nausea, abdominal pain, diarrhea. 10 point review of systems is otherwise negative with the exception of the elements mentioned above in the HPI. No Known Allergies    OBJECTIVE:  Patient Vitals for the past 8 hrs:   BP Temp Pulse Resp SpO2   20 0759 146/76 98.1 °F (36.7 °C) 63 17 99 %   20 0348 137/72 97.9 °F (36.6 °C) 66 16 99 %     Temp (24hrs), Av.1 °F (36.7 °C), Min:97.9 °F (36.6 °C), Max:98.3 °F (36.8 °C)     07 -  1900  In: 645 [P.O.:355; I.V.:276]  Out: -     Physical Exam:  Constitutional: Well developed, well nourished female in no acute distress, sitting comfortably on the bedside couch. HEENT: Normocephalic and atraumatic. Oropharynx is clear, mucous membranes are moist.  Extraocular muscles are intact. Sclerae anicteric. Neck supple without JVD. No thyromegaly present. Skin Warm and dry. No bruising and no rash noted. No pallor. Respiratory Lungs are clear to auscultation bilaterally without wheezes, rales or rhonchi, normal air exchange without accessory muscle use. CVS Normal rate, regular rhythm and normal S1 and S2. No murmurs, gallops, or rubs. Abdomen Soft, nontender and nondistended, normoactive bowel sounds. No palpable mass. No hepatosplenomegaly. Neuro Grossly nonfocal with no obvious sensory or motor deficits.    MSK Normal range of motion in general.  No edema and no tenderness. Psych Appropriate mood and affect.         Labs:      Recent Labs     01/06/20  0331 01/05/20  0501 01/04/20  0347   WBC 0.9* 1.7* 3.3*   RBC 2.73* 2.62* 2.52*   HGB 8.7* 8.6* 8.1*   HCT 26.0* 25.3* 24.6*   MCV 95.2 96.6 97.6   MCH 31.9 32.8 32.1   MCHC 33.5 34.0 32.9   RDW 18.9* 19.4* 19.5*   * 166 192   GRANS 100* 99* 100*   LYMPH 0* 1* 0*   MONOS 0* 0* 0*   EOS 0* 0* 0*   BASOS 0 0 0   IG 0 0 0   DF AUTOMATED AUTOMATED AUTOMATED   ANEU 0.9* 1.7 3.3   ABL 0.0* 0.0* 0.0*   ABM 0.0* 0.0* 0.0*   ALEKSANDR 0.0 0.0 0.0   ABB 0.0 0.0 0.0   AIG 0.0 0.0 0.0        Recent Labs     01/06/20  0331 01/05/20  0501 01/04/20  0347    140 140   K 4.3 4.4 4.1    106 107   CO2 29 29 26   AGAP 5* 5* 7   * 141* 146*   BUN 23 23 21   CREA 0.61 0.64 0.55*   GFRAA >60 >60 >60   GFRNA >60 >60 >60   CA 9.1 9.1 9.3   SGOT 20 18 20   AP 86 83 85   TP 6.3 6.1* 5.9*   ALB 3.1* 3.0* 2.8*   GLOB 3.2 3.1 3.1   AGRAT 1.0* 1.0* 0.9*   MG 2.1 2.0 2.0         Imaging: n/a    Medications:  Current Facility-Administered Medications   Medication Dose Route Frequency    alum-mag hydroxide-simeth (MYLANTA) oral suspension 30 mL  30 mL Oral Q4H PRN    zolpidem (AMBIEN) tablet 10 mg  10 mg Oral QHS PRN    polyethylene glycol (MIRALAX) packet 17 g  17 g Oral DAILY    vancomycin (VANCOCIN) 1,000 mg in 0.9% sodium chloride (MBP/ADV) 250 mL  1,000 mg IntraVENous Q12H    LORazepam (ATIVAN) injection 0.5 mg  0.5 mg IntraVENous Q6H PRN    prednisoLONE acetate (PRED FORTE) 1 % ophthalmic suspension 2 Drop  2 Drop Both Eyes Q6H    tbo-filgrastim (GRANIX) injection 480 mcg  480 mcg SubCUTAneous QHS    vit C,U-Ev-nqvle-lutein-zeaxan (PRESERVISION AREDS-2) capsule 1 Cap (Patient Supplied)  1 Cap Oral DAILY    acetaminophen/diphenhydrAMINE (TYLENOL PM EXT STR) 500/25 mg   Oral QHS    acyclovir (ZOVIRAX) tablet 400 mg  400 mg Oral BID    allopurinol (ZYLOPRIM) tablet 300 mg 300 mg Oral DAILY    cholecalciferol (VITAMIN D3) (400 Units /10 mcg) tablet 2 Tab  800 Units Oral DAILY    DULoxetine (CYMBALTA) capsule 30 mg  30 mg Oral DAILY    lidocaine-prilocaine (EMLA) 2.5-2.5 % cream   Topical PRN    LORazepam (ATIVAN) tablet 1 mg  1 mg Oral QHS    potassium chloride (K-DUR, KLOR-CON) SR tablet 20 mEq  20 mEq Oral BID    pravastatin (PRAVACHOL) tablet 10 mg  10 mg Oral QHS    voriconazole (VFEND) tablet 200 mg  200 mg Oral Q12H    ondansetron (ZOFRAN) injection 4 mg  4 mg IntraVENous Q4H PRN    prochlorperazine (COMPAZINE) with saline injection 5 mg  5 mg IntraVENous Q6H PRN    HYDROcodone-acetaminophen (NORCO) 5-325 mg per tablet 1 Tab  1 Tab Oral Q6H PRN    morphine injection 2 mg  2 mg IntraVENous Q4H PRN    0.9% sodium chloride infusion  75 mL/hr IntraVENous CONTINUOUS    enoxaparin (LOVENOX) injection 40 mg  40 mg SubCUTAneous Q24H         ASSESSMENT:    Problem List  Date Reviewed: 12/19/2019          Codes Class Noted    Febrile neutropenia ([facility] ICD-10-CM: D70.9, R50.81  ICD-9-CM: 288.00, 780.61  9/21/2019        Pancytopenia due to antineoplastic chemotherapy [facility]) ICD-10-CM: S88.018, T45.1X5A  ICD-9-CM: 284.11, E933.1  6/12/2019        Cellulitis of neck ICD-10-CM: I86.682  ICD-9-CM: 682.1  6/12/2019        Immunocompromised status associated with infection ICD-10-CM: B99.9  ICD-9-CM: 136.9  6/12/2019        Port or reservoir infection ICD-10-CM: P37.039K  ICD-9-CM: 999.33  6/12/2019        Acute myeloid leukemia not having achieved remission ([facility].) ICD-10-CM: C92.00  ICD-9-CM: 205.00  5/9/2019        Admission for antineoplastic chemotherapy ICD-10-CM: Z51.11  ICD-9-CM: V58.11  5/5/2019        AML (acute myeloblastic leukemia) ([facility] ICD-10-CM: C92.00  ICD-9-CM: 205.00  4/28/2019        Weakness generalized ICD-10-CM: R53.1  ICD-9-CM: 780.79  4/28/2019        Pancytopenia ([facility] ICD-10-CM: X53.731  ICD-9-CM: 284.19  4/28/2019        Thrombocytopenia ([facility] ICD-10-CM: D69.6  ICD-9-CM: 287.5  4/27/2019            Ms. Lennie Corea is a 76 y.o. female admitted on 12/30/2019 with a primary diagnosis of There were no encounter diagnoses. .       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 Subsequently on Decitabine plus Gilteritinib x 3 cycles w/ persistent disease. S/p FLAG-VENITA 11/19 w persistent disease though improvement in blast percentage as well as cytopenias. FLAG-VENITA course was complicated by neutropenic fever and E fecalis/alpha strep bacteremia requiring abx course,  ID transitioned to oral Augmentin at discharge which she completed, port was retained. Counts have clearly improved with improvement in [address] and thrombocytopenia however BM biopsy with persistent disease open (cellularity described at 30% with 10-20% residual blasts on IHC). Results shared with patient, various options discussed moving forward. For now she will try to enjoy holidays/[place] with her family and return next week for likely reinduction. Will consider intermediate dose rivera-C based regimen. Seen today 12/30/2019: Enjoyed [place] at home. Some fatigue persists. Appetite slowly improving. Discussed various options again, counseled clearly improved: Various options discussed including intermediate dose rivera-C versus reinduction with modified FLAG (will hold off on Kaela Divers given past anthracycline exposure), patient has opted for reinduction with FL AG which is reasonable. Denies any recent fevers or chills, okay to admit for reinduction. Continue prophylactic antibiotics. Will repeat BM biopsy on count recovery. She will stay in house until counts recover. PLAN:  AML  - admit for re-induction w modified dose FLAG  12/31 Day 1 FLAG. 1/6 Day 7 FLAG. Tolerating treatment well. Awaiting count recovery. Start Granix.     Ingrown toenail  12/31 Was going to defer treatment pending podiatrist consult, but no podiatrist will come see pt while IP.  Gave pt the option of being discharged to see podiatrist prior to proceeding with treatment vs starting treatment along with Vanc. Pt prefers to start treatment. Vanc ordered. 1/1 on Vanc. Toe covered with bandaid today when seen. 1/2  Toe appears much improved. Con't Vanc. 1/3 Infx appears resolved. Will continue Vanc through weekend and will likely DC early next week. 1/4 Infx resolved. Plan to DC Vanc after 7 days of treatment. 1/6 Con't Vanc (D6). Bradycardia  1/4 Asymptomatic. EKG with sinus irish. Pancytopenia secondary to chemotherapy  - Transfuse prn per [facility] SOPs    Continue home meds  Prophylactic Antibx: Acyclovir, Voriconazole  [facility] SOPs  Lovenox for DVT prophylaxis (hold for plt <50k)    Disposition:  Discharge pending count recovery. On Granix. Will need repeat BMbx on count recovery. RTC within week of discharge. Goals and plan of care reviewed with the patient. All questions answered to the best of our ability. Doris Vega NP   [facility] Hematology & Oncology  [address] [place] [address]  Office : [phone]  Fax : [phone]   I personally saw, exammed and counselled the patient, and discussed with NP, agree with above history/assessment/plan. 73 y. o.female AML with FLT3 mut s/p multiple lines of chemo and Gilteritinib but refractory, received salvage FLAG day 7, start granix, toe nail cellulitis responded to iv vanc, continue above care, Cheryl Rivera M.D.   [address]  Office : [phone]  Fax : [phone]

## 2020-01-06 NOTE — PROGRESS NOTES
Nutrition  Reason for assessment: LOS Day 6    Assessment:   Diet: DIET REGULAR    Food/Nutrition Patient History:    Pt is 77 yo female who presented for antineoplastic chemo. She has AML. Pt just finished Day 7 FLAG. Pt was present in room with her . Pt reports that she has just started to know a slight decrease in her intake/appetite. She states though at this point it varies depending on the meal. Pt ate 100% of lunch today. She states she will take zofran prior to eating to help. She says she is having nausea but no vomiting. At this point she would like to continue with just regular foods. But knows it is likely later this week she may need nutritional supplements. Meds: Zofran PRN, Granix     Anthropometrics:   , Weight: 80.4 kg (177 lb 4 oz), Weight Source: Standing scale (comment), Body mass index is 29.05 kg/m². BMI class of overweight. Last 3 Recorded Weights in this Encounter    01/03/20 2042 01/04/20 1959 01/05/20 1903   Weight: 83.5 kg (184 lb 1.6 oz) 81.2 kg (179 lb 1.6 oz) 80.4 kg (177 lb 4 oz)     WT / BMI 1/5/2020 12/30/2019 12/30/2019 12/26/2019   WEIGHT 177 lb 4 oz 180 lb 9.6 oz  178 lb 6.4 oz   BODY MASS INDEX  29.6 kg/m2 29.05 kg/m2 29.24 kg/m2       WT / BMI 11/7/2019 11/6/2019 11/4/2019 10/31/2019   WEIGHT  191 lb 7 oz 190 lb 6.4 oz 191 lb   BODY MASS INDEX 29.07 kg/m2 30.9 kg/m2 30.73 kg/m2 30.83 kg/m2   Unable to verify weight sources. Pt possibly lost 14lbs in 2 months. 7.3%, clinically significant. Macronutrient needs: 80.4kg (current)  EER: 2399-5339 kcal/day (20-22 kcals/kg)  EPR: 80-96 g/day (1-1.2 g/kg)    Intake/Comparative Standards: Per documentation, patient consuming average 95% of 10 recorded meals in 5 days. This meets % of kcal needs and % of protein needs.   Patient Vitals for the past 100 hrs:   % Diet Eaten   01/06/20 0951 100 %   01/05/20 1824 75 %   01/05/20 0851 100 %   01/04/20 1731 100 %   01/04/20 1310 100 %   01/04/20 0818 100 % 01/03/20 1717 100 %   01/03/20 1223 100 %   01/03/20 0850 100 %   01/02/20 1237 75 %     Nutrition Diagnosis:  Unintended weight loss related to prolonged catabolic illness as evidenced by changes in appetite and intake and weight loss 7.3% in 2 months. Nutrition Intervention:  Meals and Snacks: Continue with current diet. Commercial Beverages and Supplements: Not at this time, per pt. Discharge Nutrition Plan: Too soon to determine  Goal: - PO nutrition intake will meet >75% of patient estimated nutritional needs within the next 7 days.      Aster Dumont Jonh [address], [address],    [phone]

## 2020-01-06 NOTE — PROGRESS NOTES
Problem: Falls - Risk of  Goal: *Absence of Falls  Description  Document Zana Singh Fall Risk and appropriate interventions in the flowsheet.   Outcome: Progressing Towards Goal  Note: Fall Risk Interventions:  Mobility Interventions: Patient to call before getting OOB         Medication Interventions: Teach patient to arise slowly    Elimination Interventions: Call light in reach, Patient to call for help with toileting needs    History of Falls Interventions: Consult care management for discharge planning

## 2020-01-07 LAB
ALBUMIN SERPL-MCNC: 3.1 G/DL (ref 3.2–4.6)
ALBUMIN/GLOB SERPL: 1.1 {RATIO} (ref 1.2–3.5)
ALP SERPL-CCNC: 82 U/L (ref 50–136)
ALT SERPL-CCNC: 30 U/L (ref 12–65)
ANION GAP SERPL CALC-SCNC: 7 MMOL/L (ref 7–16)
AST SERPL-CCNC: 21 U/L (ref 15–37)
BASOPHILS # BLD: 0 K/UL (ref 0–0.2)
BASOPHILS NFR BLD: 0 % (ref 0–2)
BILIRUB SERPL-MCNC: 0.4 MG/DL (ref 0.2–1.1)
BUN SERPL-MCNC: 22 MG/DL (ref 8–23)
CALCIUM SERPL-MCNC: 9.2 MG/DL (ref 8.3–10.4)
CHLORIDE SERPL-SCNC: 106 MMOL/L (ref 98–107)
CO2 SERPL-SCNC: 26 MMOL/L (ref 21–32)
CREAT SERPL-MCNC: 0.67 MG/DL (ref 0.6–1)
DIFFERENTIAL METHOD BLD: ABNORMAL
EOSINOPHIL # BLD: 0 K/UL (ref 0–0.8)
EOSINOPHIL NFR BLD: 0 % (ref 0.5–7.8)
ERYTHROCYTE [DISTWIDTH] IN BLOOD BY AUTOMATED COUNT: 19.7 % (ref 11.9–14.6)
GLOBULIN SER CALC-MCNC: 2.8 G/DL (ref 2.3–3.5)
GLUCOSE SERPL-MCNC: 91 MG/DL (ref 65–100)
HCT VFR BLD AUTO: 26.7 % (ref 35.8–46.3)
HGB BLD-MCNC: 8.8 G/DL (ref 11.7–15.4)
IMM GRANULOCYTES # BLD AUTO: 0 K/UL (ref 0–0.5)
IMM GRANULOCYTES NFR BLD AUTO: 1 % (ref 0–5)
LDH SERPL L TO P-CCNC: 189 U/L (ref 110–210)
LYMPHOCYTES # BLD: 0 K/UL (ref 0.5–4.6)
LYMPHOCYTES NFR BLD: 2 % (ref 13–44)
MAGNESIUM SERPL-MCNC: 1.9 MG/DL (ref 1.8–2.4)
MCH RBC QN AUTO: 32.2 PG (ref 26.1–32.9)
MCHC RBC AUTO-ENTMCNC: 33 G/DL (ref 31.4–35)
MCV RBC AUTO: 97.8 FL (ref 79.6–97.8)
MONOCYTES # BLD: 0 K/UL (ref 0.1–1.3)
MONOCYTES NFR BLD: 1 % (ref 4–12)
NEUTS SEG # BLD: 2 K/UL (ref 1.7–8.2)
NEUTS SEG NFR BLD: 96 % (ref 43–78)
NRBC # BLD: 0 K/UL (ref 0–0.2)
PLATELET # BLD AUTO: 100 K/UL (ref 150–450)
PLATELET COMMENTS,PCOM: SLIGHT
PMV BLD AUTO: 9.8 FL (ref 9.4–12.3)
POTASSIUM SERPL-SCNC: 4.6 MMOL/L (ref 3.5–5.1)
PROT SERPL-MCNC: 5.9 G/DL (ref 6.3–8.2)
RBC # BLD AUTO: 2.73 M/UL (ref 4.05–5.2)
RBC MORPH BLD: ABNORMAL
SODIUM SERPL-SCNC: 139 MMOL/L (ref 136–145)
URATE SERPL-MCNC: 3.7 MG/DL (ref 2.6–6)
WBC # BLD AUTO: 2 K/UL (ref 4.3–11.1)
WBC MORPH BLD: ABNORMAL

## 2020-01-07 PROCEDURE — 84550 ASSAY OF BLOOD/URIC ACID: CPT

## 2020-01-07 PROCEDURE — 83615 LACTATE (LD) (LDH) ENZYME: CPT

## 2020-01-07 PROCEDURE — 74011250636 HC RX REV CODE- 250/636: Performed by: NURSE PRACTITIONER

## 2020-01-07 PROCEDURE — 99232 SBSQ HOSP IP/OBS MODERATE 35: CPT | Performed by: INTERNAL MEDICINE

## 2020-01-07 PROCEDURE — 83735 ASSAY OF MAGNESIUM: CPT

## 2020-01-07 PROCEDURE — 74011250636 HC RX REV CODE- 250/636: Performed by: INTERNAL MEDICINE

## 2020-01-07 PROCEDURE — 74011250637 HC RX REV CODE- 250/637: Performed by: NURSE PRACTITIONER

## 2020-01-07 PROCEDURE — 85025 COMPLETE CBC W/AUTO DIFF WBC: CPT

## 2020-01-07 PROCEDURE — 36591 DRAW BLOOD OFF VENOUS DEVICE: CPT

## 2020-01-07 PROCEDURE — 80053 COMPREHEN METABOLIC PANEL: CPT

## 2020-01-07 PROCEDURE — 65270000029 HC RM PRIVATE

## 2020-01-07 RX ADMIN — PREDNISOLONE ACETATE 2 DROP: 10 SUSPENSION/ DROPS OPHTHALMIC at 04:48

## 2020-01-07 RX ADMIN — LORAZEPAM 1 MG: 1 TABLET ORAL at 22:20

## 2020-01-07 RX ADMIN — TBO-FILGRASTIM 480 MCG: 480 INJECTION, SOLUTION SUBCUTANEOUS at 22:19

## 2020-01-07 RX ADMIN — ACYCLOVIR 400 MG: 800 TABLET ORAL at 08:33

## 2020-01-07 RX ADMIN — ENOXAPARIN SODIUM 40 MG: 40 INJECTION SUBCUTANEOUS at 18:01

## 2020-01-07 RX ADMIN — POTASSIUM CHLORIDE 20 MEQ: 20 TABLET, EXTENDED RELEASE ORAL at 17:59

## 2020-01-07 RX ADMIN — VORICONAZOLE 200 MG: 200 TABLET, FILM COATED ORAL at 08:33

## 2020-01-07 RX ADMIN — POTASSIUM CHLORIDE 20 MEQ: 20 TABLET, EXTENDED RELEASE ORAL at 08:33

## 2020-01-07 RX ADMIN — VORICONAZOLE 200 MG: 200 TABLET, FILM COATED ORAL at 22:20

## 2020-01-07 RX ADMIN — ACYCLOVIR 400 MG: 800 TABLET ORAL at 17:59

## 2020-01-07 RX ADMIN — DULOXETINE 30 MG: 30 CAPSULE, DELAYED RELEASE ORAL at 08:42

## 2020-01-07 RX ADMIN — PRAVASTATIN SODIUM 10 MG: 20 TABLET ORAL at 22:20

## 2020-01-07 RX ADMIN — PREDNISOLONE ACETATE 2 DROP: 10 SUSPENSION/ DROPS OPHTHALMIC at 10:28

## 2020-01-07 RX ADMIN — ALLOPURINOL 300 MG: 300 TABLET ORAL at 08:33

## 2020-01-07 RX ADMIN — PREDNISOLONE ACETATE 2 DROP: 10 SUSPENSION/ DROPS OPHTHALMIC at 16:07

## 2020-01-07 RX ADMIN — Medication 2 TABLET: at 08:33

## 2020-01-07 RX ADMIN — ZOLPIDEM TARTRATE 10 MG: 5 TABLET ORAL at 22:20

## 2020-01-07 RX ADMIN — ACETAMINOPHEN: 500 TABLET, FILM COATED ORAL at 22:19

## 2020-01-07 RX ADMIN — PREDNISOLONE ACETATE 2 DROP: 10 SUSPENSION/ DROPS OPHTHALMIC at 22:21

## 2020-01-07 NOTE — PROGRESS NOTES
0671-Bedside report received from Lexa Taylor RN. Resting in bed. No needs voiced. No s/s of acute distress. 1800-END OF SHIFT NOTE:  Pt's VSS and is in no acute distress. Pt is awaiting count recovery for discharge. Intake/Output  01/07 0701 - 01/07 1900  In: 360 [P.O.:360]  Out: 800 [Urine:800]   Voiding: YES  Catheter: NO  Drain:        Stool:  0 occurrences. Stool Assessment  Stool Color: Naomia Rj (01/06/20 1741)  Stool Appearance: Soft (01/06/20 1741)  Stool Amount: Medium (01/06/20 1741)  Stool Source/Status: Rectum (01/06/20 1741)    Emesis:  0 occurrences. VITAL SIGNS  Patient Vitals for the past 12 hrs:   Temp Pulse Resp BP SpO2   01/07/20 1433 98.2 °F (36.8 °C) 79 16 104/50 99 %   01/07/20 1123 97.8 °F (36.6 °C) 69 16 99/58 99 %   01/07/20 0705 97.9 °F (36.6 °C) 80 16 135/63 98 %       Pain Assessment  Pain 1  Pain Scale 1: Numeric (0 - 10) (01/07/20 1420)  Pain Intensity 1: 0 (01/07/20 1420)  Patient Stated Pain Goal: 0 (01/07/20 0215)    Ambulating  Yes      Lindsey Rai  1845-Bedside shift change report given to Kelsy Lau RN (oncoming nurse) by  Traci Ennis RN (offgoing nurse). Report included the following information SBAR, Kardex, Intake/Output, MAR and Recent Results.

## 2020-01-07 NOTE — PROGRESS NOTES
[facility] Hematology & Oncology        Inpatient Hematology / Oncology Progress Note      Admission Date: 2019  5:52 PM  Reason for Admission/Hospital Course: Admission for antineoplastic chemotherapy [Z51.11]      24 Hour Events:  Afebrile, VSS  Day 8 FLAG  Doing well, no complaints  ANC up to 2.0  On Granix   at bedside    ROS:  Constitutional: Negative for fever, chills, weakness, malaise, fatigue. CV: Negative for chest pain, palpitations, edema. Respiratory: Negative for dyspnea, cough, wheezing. GI: Negative for nausea, abdominal pain, diarrhea. 10 point review of systems is otherwise negative with the exception of the elements mentioned above in the HPI. No Known Allergies    OBJECTIVE:  Patient Vitals for the past 8 hrs:   BP Temp Pulse Resp SpO2   20 0705 135/63 97.9 °F (36.6 °C) 80 16 98 %   20 0214 127/66 97.6 °F (36.4 °C) 70 16 99 %     Temp (24hrs), Av.8 °F (36.6 °C), Min:97.6 °F (36.4 °C), Max:98.2 °F (36.8 °C)     07 -  1900  In: 240 [P.O.:240]  Out: 500 [Urine:500]    Physical Exam:  Constitutional: Well developed, well nourished female in no acute distress, sitting comfortably on the bedside couch. HEENT: Normocephalic and atraumatic. Oropharynx is clear, mucous membranes are moist.  Extraocular muscles are intact. Sclerae anicteric. Neck supple without JVD. No thyromegaly present. Skin Warm and dry. No bruising and no rash noted. No pallor. Respiratory Lungs are clear to auscultation bilaterally without wheezes, rales or rhonchi, normal air exchange without accessory muscle use. CVS Normal rate, regular rhythm and normal S1 and S2. No murmurs, gallops, or rubs. Abdomen Soft, nontender and nondistended, normoactive bowel sounds. No palpable mass. No hepatosplenomegaly. Neuro Grossly nonfocal with no obvious sensory or motor deficits. MSK Normal range of motion in general.  No edema and no tenderness.    Psych Appropriate mood and affect.         Labs:      Recent Labs     01/07/20  0206 01/06/20  0331 01/05/20  0501   WBC 2.0* 0.9* 1.7*   RBC 2.73* 2.73* 2.62*   HGB 8.8* 8.7* 8.6*   HCT 26.7* 26.0* 25.3*   MCV 97.8 95.2 96.6   MCH 32.2 31.9 32.8   MCHC 33.0 33.5 34.0   RDW 19.7* 18.9* 19.4*   * 137* 166   GRANS 96* 100* 99*   LYMPH 2* 0* 1*   MONOS 1* 0* 0*   EOS 0* 0* 0*   BASOS 0 0 0   IG 1 0 0   DF AUTOMATED AUTOMATED AUTOMATED   ANEU 2.0 0.9* 1.7   ABL 0.0* 0.0* 0.0*   ABM 0.0* 0.0* 0.0*   ALEKSANDR 0.0 0.0 0.0   ABB 0.0 0.0 0.0   AIG 0.0 0.0 0.0        Recent Labs     01/07/20  0206 01/06/20  0331 01/05/20  0501    137 140   K 4.6 4.3 4.4    103 106   CO2 26 29 29   AGAP 7 5* 5*   GLU 91 102* 141*   BUN 22 23 23   CREA 0.67 0.61 0.64   GFRAA >60 >60 >60   GFRNA >60 >60 >60   CA 9.2 9.1 9.1   SGOT 21 20 18   AP 82 86 83   TP 5.9* 6.3 6.1*   ALB 3.1* 3.1* 3.0*   GLOB 2.8 3.2 3.1   AGRAT 1.1* 1.0* 1.0*   MG 1.9 2.1 2.0         Imaging: n/a    Medications:  Current Facility-Administered Medications   Medication Dose Route Frequency    alum-mag hydroxide-simeth (MYLANTA) oral suspension 30 mL  30 mL Oral Q4H PRN    zolpidem (AMBIEN) tablet 10 mg  10 mg Oral QHS PRN    polyethylene glycol (MIRALAX) packet 17 g  17 g Oral DAILY    LORazepam (ATIVAN) injection 0.5 mg  0.5 mg IntraVENous Q6H PRN    prednisoLONE acetate (PRED FORTE) 1 % ophthalmic suspension 2 Drop  2 Drop Both Eyes Q6H    tbo-filgrastim (GRANIX) injection 480 mcg  480 mcg SubCUTAneous QHS    vit C,Y-Zb-omkze-lutein-zeaxan (PRESERVISION AREDS-2) capsule 1 Cap (Patient Supplied)  1 Cap Oral DAILY    acetaminophen/diphenhydrAMINE (TYLENOL PM EXT STR) 500/25 mg   Oral QHS    acyclovir (ZOVIRAX) tablet 400 mg  400 mg Oral BID    allopurinol (ZYLOPRIM) tablet 300 mg  300 mg Oral DAILY    cholecalciferol (VITAMIN D3) (400 Units /10 mcg) tablet 2 Tab  800 Units Oral DAILY    DULoxetine (CYMBALTA) capsule 30 mg  30 mg Oral DAILY    lidocaine-prilocaine (EMLA) 2.5-2.5 % cream   Topical PRN    LORazepam (ATIVAN) tablet 1 mg  1 mg Oral QHS    potassium chloride (K-DUR, KLOR-CON) SR tablet 20 mEq  20 mEq Oral BID    pravastatin (PRAVACHOL) tablet 10 mg  10 mg Oral QHS    voriconazole (VFEND) tablet 200 mg  200 mg Oral Q12H    ondansetron (ZOFRAN) injection 4 mg  4 mg IntraVENous Q4H PRN    prochlorperazine (COMPAZINE) with saline injection 5 mg  5 mg IntraVENous Q6H PRN    HYDROcodone-acetaminophen (NORCO) 5-325 mg per tablet 1 Tab  1 Tab Oral Q6H PRN    morphine injection 2 mg  2 mg IntraVENous Q4H PRN    0.9% sodium chloride infusion  75 mL/hr IntraVENous CONTINUOUS    enoxaparin (LOVENOX) injection 40 mg  40 mg SubCUTAneous Q24H         ASSESSMENT:    Problem List  Date Reviewed: 12/19/2019          Codes Class Noted    Febrile neutropenia ([email or web address] ICD-10-CM: D70.9, R50.81  ICD-9-CM: 288.00, 780.61  9/21/2019        Pancytopenia due to antineoplastic chemotherapy [facility]) ICD-10-CM: K00.707, T45.1X5A  ICD-9-CM: 284.11, E933.1  6/12/2019        Cellulitis of neck ICD-10-CM: G65.148  ICD-9-CM: 682.1  6/12/2019        Immunocompromised status associated with infection ICD-10-CM: B99.9  ICD-9-CM: 136.9  6/12/2019        Port or reservoir infection ICD-10-CM: M39.808Z  ICD-9-CM: 999.33  6/12/2019        Acute myeloid leukemia not having achieved remission ([facility] [[email or web address].) ICD-10-CM: C92.00  ICD-9-CM: 205.00  5/9/2019        Admission for antineoplastic chemotherapy ICD-10-CM: Z51.11  ICD-9-CM: V58.11  5/5/2019        AML (acute myeloblastic leukemia) ([email or web address].) ICD-10-CM: C92.00  ICD-9-CM: 205.00  4/28/2019        Weakness generalized ICD-10-CM: R53.1  ICD-9-CM: 780.79  4/28/2019        Pancytopenia ([facility] [[email or web address] 75.) ICD-10-CM: I05.987  ICD-9-CM: 284.19  4/28/2019        Thrombocytopenia ([facility] [[email or web address].) ICD-10-CM: D69.6  ICD-9-CM: 287.5  4/27/2019            Ms. Saurav Cleaning is a 76 y.o. female admitted on 12/30/2019 with a primary diagnosis of There were no encounter diagnoses. .       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 Subsequently on Decitabine plus Gilteritinib x 3 cycles w/ persistent disease. S/p FLAG-VENITA 11/19 w persistent disease though improvement in blast percentage as well as cytopenias. FLAG-VENITA course was complicated by neutropenic fever and E fecalis/alpha strep bacteremia requiring abx course,  ID transitioned to oral Augmentin at discharge which she completed, port was retained. Counts have clearly improved with improvement in [address] and thrombocytopenia however BM biopsy with persistent disease open (cellularity described at 30% with 10-20% residual blasts on IHC). Results shared with patient, various options discussed moving forward. For now she will try to enjoy holidays/[place] with her family and return next week for likely reinduction. Will consider intermediate dose rivera-C based regimen. Seen today 12/30/2019: Enjoyed [place] at home. Some fatigue persists. Appetite slowly improving. Discussed various options again, counseled clearly improved: Various options discussed including intermediate dose rivera-C versus reinduction with modified FLAG (will hold off on Terrea Salle given past anthracycline exposure), patient has opted for reinduction with FL AG which is reasonable. Denies any recent fevers or chills, okay to admit for reinduction. Continue prophylactic antibiotics. Will repeat BM biopsy on count recovery. She will stay in house until counts recover. PLAN:  AML  - admit for re-induction w modified dose FLAG  12/31 Day 1 FLAG. 1/6 Day 7 FLAG. Tolerating treatment well. Awaiting count recovery. Start Granix. 1/7 Day 8. ANC up to 2.0. On Granix. Ingrown toenail / Cellulitis  12/31 Was going to defer treatment pending podiatrist consult, but no podiatrist will come see pt while IP. Gave pt the option of being discharged to see podiatrist prior to proceeding with treatment vs starting treatment along with Vanc. Pt prefers to start treatment. Vanc ordered. 1/1 on Vanc. Toe covered with bandaid today when seen. 1/2  Toe appears much improved. Con't Vanc. 1/3 Infx appears resolved. Will continue Vanc through weekend and will likely DC early next week. 1/4 Infx resolved. Plan to DC Vanc after 7 days of treatment. 1/6 Con't Vanc (D6).   1/7 Resolved. Completed Vanc. Bradycardia  1/4 Asymptomatic. EKG with sinus irish. Pancytopenia secondary to chemotherapy  - Transfuse prn per [facility] SOPs    Continue home meds  Prophylactic Antibx: Acyclovir, Voriconazole  [facility] SOPs  Lovenox for DVT prophylaxis (hold for plt <50k)    Disposition:  Discharge pending count recovery. ANC 2.0 today. On Granix. Will need repeat BMbx on count recovery. RTC within week of discharge. Goals and plan of care reviewed with the patient. All questions answered to the best of our ability. [facility], NP   [facility]  [address] [place] [address]  Office : [phone]  Fax : [phone]   I personally saw, exammed and counselled the patient, and discussed with NP, agree with above history/assessment/plan. 73 y. o.female AML with FLT3 mut s/p multiple lines of chemo and Gilteritinib but refractory, received salvage FLAG day 8, on granix, toe nail cellulitis responded to iv vanc and stop, continue above care, Alonzo Lopez M.D.   [address]  Office : [phone]  Fax : [phone]

## 2020-01-07 NOTE — PROGRESS NOTES
END OF SHIFT NOTE:    Intake/Output  01/06 1901 - 01/07 0700  In: 240 [P.O.:240]  Out: 1100 [Urine:1100]   Voiding: YES  Catheter: NO  Drain:              Stool:  0 occurrences. Stool Assessment  Stool Color: Farley Buster (01/06/20 1741)  Stool Appearance: Soft (01/06/20 1741)  Stool Amount: Medium (01/06/20 1741)  Stool Source/Status: Rectum (01/06/20 1741)    Emesis:  0 occurrences. VITAL SIGNS  Patient Vitals for the past 12 hrs:   Temp Pulse Resp BP SpO2   01/07/20 0214 97.6 °F (36.4 °C) 70 16 127/66 99 %   01/06/20 2325 98.2 °F (36.8 °C) 86 18 120/76 98 %   01/06/20 1918 98 °F (36.7 °C) 99 18 116/62 98 %       Pain Assessment  Pain 1  Pain Scale 1: Numeric (0 - 10) (01/07/20 0215)  Pain Intensity 1: 0 (01/07/20 0215)  Patient Stated Pain Goal: 0 (01/07/20 0215)    Ambulating  Yes    Additional Information:     Took over pt care at 0100. Afebrile;no bleeding. No new complaints. Shift report given to oncoming nurse Germania RN at the bedside.     Chloe Nicholas RN

## 2020-01-07 NOTE — PROGRESS NOTES
Problem: Falls - Risk of  Goal: *Absence of Falls  Description  Document Louise Kwok Fall Risk and appropriate interventions in the flowsheet.   1/7/2020 0857 by Dylon BERMAN  Outcome: Progressing Towards Goal  Note: Fall Risk Interventions:  Mobility Interventions: Strengthening exercises (ROM-active/passive)    Mentation Interventions: Increase mobility, Toileting rounds, Update white board    Medication Interventions: Teach patient to arise slowly    Elimination Interventions: Call light in reach    History of Falls Interventions: Consult care management for discharge planning, Room close to nurse's station      1/7/2020 0646 by Efren Mathews  Outcome: Progressing Towards Goal  Note: Fall Risk Interventions:  Mobility Interventions: Strengthening exercises (ROM-active/passive)    Mentation Interventions: Increase mobility, Toileting rounds, Update white board    Medication Interventions: Teach patient to arise slowly    Elimination Interventions: Call light in reach, Elevated toilet seat, Patient to call for help with toileting needs, Toileting schedule/hourly rounds, Toilet paper/wipes in reach    History of Falls Interventions: Room close to nurse's station         Problem: Patient Education: Go to Patient Education Activity  Goal: Patient/Family Education  1/7/2020 0857 by Dylon BERMAN  Outcome: Progressing Towards Goal  1/7/2020 0646 by Dylon BERMAN  Outcome: Progressing Towards Goal     Problem: Chemotherapy, Day 3 to Discharge  Goal: Activity/Safety  1/7/2020 0857 by Dylon BERMAN  Outcome: Progressing Towards Goal  1/7/2020 0646 by Dylon BERMAN  Outcome: Progressing Towards Goal  Goal: Consults, if ordered  1/7/2020 0857 by Dylon BERMAN  Outcome: Progressing Towards Goal  1/7/2020 0646 by Dylon BERMAN  Outcome: Progressing Towards Goal  Goal: Diagnostic Test/Procedures  1/7/2020 0857 by Dylon BERMAN  Outcome: Progressing Towards Goal  1/7/2020 0646 by Orval Peat J  Outcome: Progressing Towards Goal  Goal: Nutrition/Diet  1/7/2020 0857 by Orval Peat J  Outcome: Progressing Towards Goal  1/7/2020 0646 by Orval Peat J  Outcome: Progressing Towards Goal  Goal: Discharge Planning  1/7/2020 0857 by Orval Peat J  Outcome: Progressing Towards Goal  1/7/2020 0646 by Orval Peat J  Outcome: Progressing Towards Goal  Goal: Medications  1/7/2020 0857 by Orval Peat J  Outcome: Progressing Towards Goal  1/7/2020 0646 by Orval Peat J  Outcome: Progressing Towards Goal  Goal: Respiratory  1/7/2020 0857 by Orval Peat J  Outcome: Progressing Towards Goal  1/7/2020 0646 by Orval Peat J  Outcome: Progressing Towards Goal  Goal: Treatments/Interventions/Procedures  1/7/2020 0857 by Orval Peat J  Outcome: Progressing Towards Goal  1/7/2020 0646 by Orval Peat J  Outcome: Progressing Towards Goal  Goal: Psychosocial  1/7/2020 0857 by Orval Peat J  Outcome: Progressing Towards Goal  1/7/2020 0646 by Orval Peat J  Outcome: Progressing Towards Goal  Goal: *Optimal pain control at patient's stated goal  1/7/2020 0857 by Orval Peat J  Outcome: Progressing Towards Goal  1/7/2020 0646 by Orval Peat J  Outcome: Progressing Towards Goal  Goal: *Hemodynamically stable  1/7/2020 0857 by Orval Peat J  Outcome: Progressing Towards Goal  1/7/2020 0646 by Orval Peat J  Outcome: Progressing Towards Goal  Goal: *Adequate oxygenation  1/7/2020 0857 by Orval Peat J  Outcome: Progressing Towards Goal  1/7/2020 0646 by Orval Peat J  Outcome: Progressing Towards Goal     Problem: Chemotherapy Discharge Outcomes  Goal: *Verbalizes understanding and describes prescribed diet  1/7/2020 0857 by Orval Peat J  Outcome: Progressing Towards Goal  1/7/2020 0646 by Orval Peat J  Outcome: Progressing Towards Goal  Goal: *Describes follow-up/return visits to physicians  1/7/2020 0857 by Marvin BERMAN  Outcome: Progressing Towards Goal  1/7/2020 0646 by Marvin BERMAN  Outcome: Progressing Towards Goal  Goal: *Describes home care/support arrangements established based on need  1/7/2020 0857 by Marvin BERMAN  Outcome: Progressing Towards Goal  1/7/2020 0646 by Marvin BERAMN  Outcome: Progressing Towards Goal  Goal: *Describes available resources and support systems  1/7/2020 0857 by Marvin BERMAN  Outcome: Progressing Towards Goal  1/7/2020 0646 by Marvin BERMAN  Outcome: Progressing Towards Goal  Goal: *Anxiety reduced or absent  1/7/2020 0857 by Marvin BERMAN  Outcome: Progressing Towards Goal  1/7/2020 0646 by Marvin BERMAN  Outcome: Progressing Towards Goal  Goal: *Understands and describes signs and symptoms to report to providers(Stroke Metric)  1/7/2020 0857 by Marvin BERMAN  Outcome: Progressing Towards Goal  1/7/2020 0646 by Marvin BERMAN  Outcome: Progressing Towards Goal  Goal: *Hemodynamically stable  1/7/2020 0857 by Marvin BERMAN  Outcome: Progressing Towards Goal  1/7/2020 0646 by Marvin BERMAN  Outcome: Progressing Towards Goal  Goal: *Tolerating diet  1/7/2020 0857 by Marvin BERMAN  Outcome: Progressing Towards Goal  1/7/2020 0646 by Marvin BERMAN  Outcome: Progressing Towards Goal  Goal: *Verbalizes understanding and describes medication purposes and frequencies  1/7/2020 0857 by Marvin BERMAN  Outcome: Progressing Towards Goal  1/7/2020 0646 by Marvin BERMAN  Outcome: Progressing Towards Goal  Goal: *Venous access site with positive blood return and without signs and symptoms of infection  1/7/2020 0857 by Marvin BERMAN  Outcome: Progressing Towards Goal  1/7/2020 0646 by Marvin BERMAN  Outcome: Progressing Towards Goal  Goal: *Verbalizes understanding of bowel regimen  1/7/2020 0857 by Irena Muniz Germania BERMAN  Outcome: Progressing Towards Goal  1/7/2020 0646 by Antonieta BERMAN  Outcome: Progressing Towards Goal     Problem: Nutrition Deficit  Goal: *Optimize nutritional status  1/7/2020 0857 by Antonieta BERMAN  Outcome: Progressing Towards Goal  1/7/2020 0646 by Antonieta BERMAN  Outcome: Progressing Towards Goal

## 2020-01-07 NOTE — PROGRESS NOTES
Problem: Falls - Risk of  Goal: *Absence of Falls  Description  Document Aiyana Archer Fall Risk and appropriate interventions in the flowsheet.   Outcome: Progressing Towards Goal  Note: Fall Risk Interventions:  Mobility Interventions: Strengthening exercises (ROM-active/passive)    Mentation Interventions: Increase mobility, Toileting rounds, Update white board    Medication Interventions: Teach patient to arise slowly    Elimination Interventions: Call light in reach, Elevated toilet seat, Patient to call for help with toileting needs, Toileting schedule/hourly rounds, Toilet paper/wipes in reach    History of Falls Interventions: Room close to nurse's station         Problem: Patient Education: Go to Patient Education Activity  Goal: Patient/Family Education  Outcome: Progressing Towards Goal     Problem: Chemotherapy, Day 3 to Discharge  Goal: Activity/Safety  Outcome: Progressing Towards Goal  Goal: Consults, if ordered  Outcome: Progressing Towards Goal  Goal: Diagnostic Test/Procedures  Outcome: Progressing Towards Goal  Goal: Nutrition/Diet  Outcome: Progressing Towards Goal  Goal: Discharge Planning  Outcome: Progressing Towards Goal  Goal: Medications  Outcome: Progressing Towards Goal  Goal: Respiratory  Outcome: Progressing Towards Goal  Goal: Treatments/Interventions/Procedures  Outcome: Progressing Towards Goal  Goal: Psychosocial  Outcome: Progressing Towards Goal  Goal: *Optimal pain control at patient's stated goal  Outcome: Progressing Towards Goal  Goal: *Hemodynamically stable  Outcome: Progressing Towards Goal  Goal: *Adequate oxygenation  Outcome: Progressing Towards Goal     Problem: Chemotherapy Discharge Outcomes  Goal: *Verbalizes understanding and describes prescribed diet  Outcome: Progressing Towards Goal  Goal: *Describes follow-up/return visits to physicians  Outcome: Progressing Towards Goal  Goal: *Describes home care/support arrangements established based on need  Outcome: Progressing Towards Goal  Goal: *Describes available resources and support systems  Outcome: Progressing Towards Goal  Goal: *Anxiety reduced or absent  Outcome: Progressing Towards Goal  Goal: *Understands and describes signs and symptoms to report to providers(Stroke Metric)  Outcome: Progressing Towards Goal  Goal: *Hemodynamically stable  Outcome: Progressing Towards Goal  Goal: *Tolerating diet  Outcome: Progressing Towards Goal  Goal: *Verbalizes understanding and describes medication purposes and frequencies  Outcome: Progressing Towards Goal  Goal: *Venous access site with positive blood return and without signs and symptoms of infection  Outcome: Progressing Towards Goal  Goal: *Verbalizes understanding of bowel regimen  Outcome: Progressing Towards Goal     Problem: Nutrition Deficit  Goal: *Optimize nutritional status  Outcome: Progressing Towards Goal

## 2020-01-07 NOTE — PROGRESS NOTES
Problem: Falls - Risk of  Goal: *Absence of Falls  Description  Document John Stewart Fall Risk and appropriate interventions in the flowsheet.   Outcome: Progressing Towards Goal  Note: Fall Risk Interventions:  Mobility Interventions: Communicate number of staff needed for ambulation/transfer         Medication Interventions: Evaluate medications/consider consulting pharmacy, Teach patient to arise slowly, Patient to call before getting OOB    Elimination Interventions: Call light in reach, Patient to call for help with toileting needs, Toilet paper/wipes in reach    History of Falls Interventions: Consult care management for discharge planning, Evaluate medications/consider consulting pharmacy, Investigate reason for fall

## 2020-01-07 NOTE — PROGRESS NOTES
END OF SHIFT NOTE:    Intake/Output  No intake/output data recorded. Voiding: YES  Catheter: NO  Drain:              Stool:  2 occurrences. Stool Assessment  Stool Color: [place]umpers (01/06/20 1741)  Stool Appearance: Soft (01/06/20 1741)  Stool Amount: Medium (01/06/20 1741)  Stool Source/Status: Rectum (01/06/20 1741)    Emesis:  0 occurrences. VITAL SIGNS  Patient Vitals for the past 12 hrs:   Temp Pulse Resp BP SpO2   01/06/20 1427 97.6 °F (36.4 °C) 90 18 110/58 98 %   01/06/20 1107 97.6 °F (36.4 °C) 74 17 116/68 98 %   01/06/20 0759 98.1 °F (36.7 °C) 63 17 146/76 99 %       Pain Assessment  Pain 1  Pain Scale 1: Numeric (0 - 10) (01/06/20 1550)  Pain Intensity 1: 0 (01/06/20 1550)  Patient Stated Pain Goal: 0 (01/06/20 1550)    Ambulating  Yes    Additional Information:   -patient more fatigued today, did not ambulate in the hallways today  -last dose of Vancomycin given this evening  -Compazine given x1 today  -granix to start tonight     Shift report given to oncoming nurse at the bedside.     Harvey Zurita RN

## 2020-01-08 LAB
ALBUMIN SERPL-MCNC: 3.1 G/DL (ref 3.2–4.6)
ALBUMIN/GLOB SERPL: 1.1 {RATIO} (ref 1.2–3.5)
ALP SERPL-CCNC: 85 U/L (ref 50–136)
ALT SERPL-CCNC: 25 U/L (ref 12–65)
ANION GAP SERPL CALC-SCNC: 7 MMOL/L (ref 7–16)
AST SERPL-CCNC: 12 U/L (ref 15–37)
BASOPHILS # BLD: 0 K/UL (ref 0–0.2)
BASOPHILS NFR BLD: 0 % (ref 0–2)
BILIRUB SERPL-MCNC: 0.6 MG/DL (ref 0.2–1.1)
BUN SERPL-MCNC: 21 MG/DL (ref 8–23)
CALCIUM SERPL-MCNC: 9.4 MG/DL (ref 8.3–10.4)
CHLORIDE SERPL-SCNC: 106 MMOL/L (ref 98–107)
CO2 SERPL-SCNC: 26 MMOL/L (ref 21–32)
CREAT SERPL-MCNC: 0.69 MG/DL (ref 0.6–1)
DIFFERENTIAL METHOD BLD: ABNORMAL
EOSINOPHIL # BLD: 0 K/UL (ref 0–0.8)
EOSINOPHIL NFR BLD: 0 % (ref 0.5–7.8)
ERYTHROCYTE [DISTWIDTH] IN BLOOD BY AUTOMATED COUNT: 19.7 % (ref 11.9–14.6)
GLOBULIN SER CALC-MCNC: 2.8 G/DL (ref 2.3–3.5)
GLUCOSE SERPL-MCNC: 92 MG/DL (ref 65–100)
HCT VFR BLD AUTO: 24.7 % (ref 35.8–46.3)
HGB BLD-MCNC: 8.3 G/DL (ref 11.7–15.4)
IMM GRANULOCYTES # BLD AUTO: 0.1 K/UL (ref 0–0.5)
IMM GRANULOCYTES NFR BLD AUTO: 5 % (ref 0–5)
LDH SERPL L TO P-CCNC: 171 U/L (ref 110–210)
LYMPHOCYTES # BLD: 0 K/UL (ref 0.5–4.6)
LYMPHOCYTES NFR BLD: 1 % (ref 13–44)
MAGNESIUM SERPL-MCNC: 1.9 MG/DL (ref 1.8–2.4)
MCH RBC QN AUTO: 33.1 PG (ref 26.1–32.9)
MCHC RBC AUTO-ENTMCNC: 33.6 G/DL (ref 31.4–35)
MCV RBC AUTO: 98.4 FL (ref 79.6–97.8)
MONOCYTES # BLD: 0 K/UL (ref 0.1–1.3)
MONOCYTES NFR BLD: 0 % (ref 4–12)
NEUTS SEG # BLD: 1 K/UL (ref 1.7–8.2)
NEUTS SEG NFR BLD: 94 % (ref 43–78)
NRBC # BLD: 0 K/UL (ref 0–0.2)
PLATELET # BLD AUTO: 51 K/UL (ref 150–450)
PLATELET COMMENTS,PCOM: ABNORMAL
PMV BLD AUTO: 10.9 FL (ref 9.4–12.3)
POTASSIUM SERPL-SCNC: 4.2 MMOL/L (ref 3.5–5.1)
PROT SERPL-MCNC: 5.9 G/DL (ref 6.3–8.2)
RBC # BLD AUTO: 2.51 M/UL (ref 4.05–5.2)
RBC MORPH BLD: ABNORMAL
SODIUM SERPL-SCNC: 139 MMOL/L (ref 136–145)
URATE SERPL-MCNC: 3.6 MG/DL (ref 2.6–6)
WBC # BLD AUTO: 1.1 K/UL (ref 4.3–11.1)
WBC MORPH BLD: ABNORMAL

## 2020-01-08 PROCEDURE — 74011250636 HC RX REV CODE- 250/636: Performed by: INTERNAL MEDICINE

## 2020-01-08 PROCEDURE — 74011250636 HC RX REV CODE- 250/636: Performed by: NURSE PRACTITIONER

## 2020-01-08 PROCEDURE — 83615 LACTATE (LD) (LDH) ENZYME: CPT

## 2020-01-08 PROCEDURE — 85025 COMPLETE CBC W/AUTO DIFF WBC: CPT

## 2020-01-08 PROCEDURE — 80053 COMPREHEN METABOLIC PANEL: CPT

## 2020-01-08 PROCEDURE — 65270000029 HC RM PRIVATE

## 2020-01-08 PROCEDURE — 74011250637 HC RX REV CODE- 250/637: Performed by: NURSE PRACTITIONER

## 2020-01-08 PROCEDURE — 84550 ASSAY OF BLOOD/URIC ACID: CPT

## 2020-01-08 PROCEDURE — 83735 ASSAY OF MAGNESIUM: CPT

## 2020-01-08 PROCEDURE — 99232 SBSQ HOSP IP/OBS MODERATE 35: CPT | Performed by: INTERNAL MEDICINE

## 2020-01-08 RX ADMIN — ACYCLOVIR 400 MG: 800 TABLET ORAL at 07:53

## 2020-01-08 RX ADMIN — PREDNISOLONE ACETATE 2 DROP: 10 SUSPENSION/ DROPS OPHTHALMIC at 17:24

## 2020-01-08 RX ADMIN — PREDNISOLONE ACETATE 2 DROP: 10 SUSPENSION/ DROPS OPHTHALMIC at 21:02

## 2020-01-08 RX ADMIN — ZOLPIDEM TARTRATE 10 MG: 5 TABLET ORAL at 21:01

## 2020-01-08 RX ADMIN — PREDNISOLONE ACETATE 2 DROP: 10 SUSPENSION/ DROPS OPHTHALMIC at 03:21

## 2020-01-08 RX ADMIN — VORICONAZOLE 200 MG: 200 TABLET, FILM COATED ORAL at 07:53

## 2020-01-08 RX ADMIN — ACETAMINOPHEN: 500 TABLET, FILM COATED ORAL at 21:01

## 2020-01-08 RX ADMIN — ONDANSETRON 4 MG: 2 INJECTION INTRAMUSCULAR; INTRAVENOUS at 07:15

## 2020-01-08 RX ADMIN — TBO-FILGRASTIM 480 MCG: 480 INJECTION, SOLUTION SUBCUTANEOUS at 21:02

## 2020-01-08 RX ADMIN — LORAZEPAM 1 MG: 1 TABLET ORAL at 21:01

## 2020-01-08 RX ADMIN — POTASSIUM CHLORIDE 20 MEQ: 20 TABLET, EXTENDED RELEASE ORAL at 17:24

## 2020-01-08 RX ADMIN — ALLOPURINOL 300 MG: 300 TABLET ORAL at 07:51

## 2020-01-08 RX ADMIN — POTASSIUM CHLORIDE 20 MEQ: 20 TABLET, EXTENDED RELEASE ORAL at 07:52

## 2020-01-08 RX ADMIN — VORICONAZOLE 200 MG: 200 TABLET, FILM COATED ORAL at 21:01

## 2020-01-08 RX ADMIN — DULOXETINE 30 MG: 30 CAPSULE, DELAYED RELEASE ORAL at 07:53

## 2020-01-08 RX ADMIN — PREDNISOLONE ACETATE 2 DROP: 10 SUSPENSION/ DROPS OPHTHALMIC at 09:30

## 2020-01-08 RX ADMIN — PRAVASTATIN SODIUM 10 MG: 20 TABLET ORAL at 21:01

## 2020-01-08 RX ADMIN — Medication 2 TABLET: at 07:52

## 2020-01-08 RX ADMIN — ACYCLOVIR 400 MG: 800 TABLET ORAL at 17:24

## 2020-01-08 NOTE — PROGRESS NOTES
Problem: Falls - Risk of  Goal: *Absence of Falls  Description  Document Zana Singh Fall Risk and appropriate interventions in the flowsheet.   Outcome: Progressing Towards Goal  Note: Fall Risk Interventions:  Mobility Interventions: Communicate number of staff needed for ambulation/transfer    Mentation Interventions: Adequate sleep, hydration, pain control, Evaluate medications/consider consulting pharmacy    Medication Interventions: Evaluate medications/consider consulting pharmacy, Teach patient to arise slowly    Elimination Interventions: Call light in reach, Patient to call for help with toileting needs, Toilet paper/wipes in reach    History of Falls Interventions: Consult care management for discharge planning, Evaluate medications/consider consulting pharmacy, Investigate reason for fall

## 2020-01-08 NOTE — PROGRESS NOTES
END OF SHIFT NOTE:    Intake/Output  01/08 0701 - 01/08 1900  In: 0346 [P.O.:1452]  Out: 1100 [Urine:1100]   Voiding: YES  Catheter: NO  Drain:              Stool:  1 occurrences. Stool Assessment  Stool Color: Farley Buster (01/08/20 1828)  Stool Appearance: Soft (01/08/20 1828)  Stool Amount: Small (01/08/20 1828)  Stool Source/Status: Rectum (01/08/20 1828)    Emesis:  0 occurrences. VITAL SIGNS  Patient Vitals for the past 12 hrs:   Temp Pulse Resp BP SpO2   01/08/20 1415 98.1 °F (36.7 °C) (!) 109 18 104/62 99 %   01/08/20 1025 98.3 °F (36.8 °C) 94 18 111/63 99 %   01/08/20 0721 97.8 °F (36.6 °C) 83 16 129/58 99 %       Pain Assessment  Pain 1  Pain Scale 1: Numeric (0 - 10) (01/08/20 1442)  Pain Intensity 1: 0 (01/08/20 1442)  Patient Stated Pain Goal: 0 (01/08/20 1442)    Ambulating  Yes    Additional Information:   -Pt had a good day, ate well, denied any nausea.  -Ambulated hallways with      Shift report given to oncoming nurse at the bedside.     Leonides Weinberg RN

## 2020-01-08 NOTE — PROGRESS NOTES
Problem: Falls - Risk of  Goal: *Absence of Falls  Description  Document Bishop Recinos Fall Risk and appropriate interventions in the flowsheet.   Outcome: Progressing Towards Goal  Note: Fall Risk Interventions:  Mobility Interventions: Communicate number of staff needed for ambulation/transfer    Mentation Interventions: Adequate sleep, hydration, pain control, Evaluate medications/consider consulting pharmacy    Medication Interventions: Evaluate medications/consider consulting pharmacy, Teach patient to arise slowly    Elimination Interventions: Call light in reach, Patient to call for help with toileting needs, Toilet paper/wipes in reach    History of Falls Interventions: Consult care management for discharge planning, Evaluate medications/consider consulting pharmacy, Investigate reason for fall

## 2020-01-08 NOTE — PROGRESS NOTES
[facility] Hematology & Oncology        Inpatient Hematology / Oncology Progress Note      Admission Date: 2019  5:52 PM  Reason for Admission/Hospital Course: Admission for antineoplastic chemotherapy [Z51.11]      24 Hour Events:  Afebrile, VSS  Day 9 FLAG  Doing well, no complaints  ANC 1000  On Granix   at bedside    ROS:  Constitutional: Negative for fever, chills, weakness, malaise, fatigue. CV: Negative for chest pain, palpitations, edema. Respiratory: Negative for dyspnea, cough, wheezing. GI: Negative for nausea, abdominal pain, diarrhea. 10 point review of systems is otherwise negative with the exception of the elements mentioned above in the HPI. No Known Allergies    OBJECTIVE:  Patient Vitals for the past 8 hrs:   BP Temp Pulse Resp SpO2   20 1025 111/63 98.3 °F (36.8 °C) 94 18 99 %   20 0721 129/58 97.8 °F (36.6 °C) 83 16 99 %   20 0315 122/88 97.5 °F (36.4 °C) 89 16 99 %     Temp (24hrs), Av.9 °F (36.6 °C), Min:97.3 °F (36.3 °C), Max:98.7 °F (37.1 °C)     07 -  1900  In: 36 [P.O.:712]  Out: 800 [Urine:800]    Physical Exam:  Constitutional: Well developed, well nourished female in no acute distress, sitting comfortably on the bed   HEENT: Normocephalic and atraumatic. Oropharynx is clear, mucous membranes are moist.  Extraocular muscles are intact. Sclerae anicteric. Skin Warm and dry. No bruising and no rash noted. No pallor. Respiratory Lungs are clear to auscultation bilaterally without wheezes, rales or rhonchi, normal air exchange without accessory muscle use. CVS Normal rate, regular rhythm and normal S1 and S2. No murmurs, gallops, or rubs. Abdomen Soft, nontender and nondistended, normoactive bowel sounds. No palpable mass. No hepatosplenomegaly. Neuro Grossly nonfocal with no obvious sensory or motor deficits. MSK Normal range of motion in general.  No edema and no tenderness.    Psych Appropriate mood and affect.         Labs:      Recent Labs     01/08/20  0324 01/07/20  0206 01/06/20  0331   WBC 1.1* 2.0* 0.9*   RBC 2.51* 2.73* 2.73*   HGB 8.3* 8.8* 8.7*   HCT 24.7* 26.7* 26.0*   MCV 98.4* 97.8 95.2   MCH 33.1* 32.2 31.9   MCHC 33.6 33.0 33.5   RDW 19.7* 19.7* 18.9*   PLT 51* 100* 137*   GRANS 94* 96* 100*   LYMPH 1* 2* 0*   MONOS 0* 1* 0*   EOS 0* 0* 0*   BASOS 0 0 0   IG 5 1 0   DF AUTOMATED AUTOMATED AUTOMATED   ANEU 1.0* 2.0 0.9*   ABL 0.0* 0.0* 0.0*   ABM 0.0* 0.0* 0.0*   ALEKSANDR 0.0 0.0 0.0   ABB 0.0 0.0 0.0   AIG 0.1 0.0 0.0        Recent Labs     01/08/20  0324 01/07/20  0206 01/06/20  0331    139 137   K 4.2 4.6 4.3    106 103   CO2 26 26 29   AGAP 7 7 5*   GLU 92 91 102*   BUN 21 22 23   CREA 0.69 0.67 0.61   GFRAA >60 >60 >60   GFRNA >60 >60 >60   CA 9.4 9.2 9.1   SGOT 12* 21 20   AP 85 82 86   TP 5.9* 5.9* 6.3   ALB 3.1* 3.1* 3.1*   GLOB 2.8 2.8 3.2   AGRAT 1.1* 1.1* 1.0*   MG 1.9 1.9 2.1         Imaging: n/a    Medications:  Current Facility-Administered Medications   Medication Dose Route Frequency    alum-mag hydroxide-simeth (MYLANTA) oral suspension 30 mL  30 mL Oral Q4H PRN    zolpidem (AMBIEN) tablet 10 mg  10 mg Oral QHS PRN    polyethylene glycol (MIRALAX) packet 17 g  17 g Oral DAILY    LORazepam (ATIVAN) injection 0.5 mg  0.5 mg IntraVENous Q6H PRN    prednisoLONE acetate (PRED FORTE) 1 % ophthalmic suspension 2 Drop  2 Drop Both Eyes Q6H    tbo-filgrastim (GRANIX) injection 480 mcg  480 mcg SubCUTAneous QHS    vit C,U-Mm-qgvjr-lutein-zeaxan (PRESERVISION AREDS-2) capsule 1 Cap (Patient Supplied)  1 Cap Oral DAILY    acetaminophen/diphenhydrAMINE (TYLENOL PM EXT STR) 500/25 mg   Oral QHS    acyclovir (ZOVIRAX) tablet 400 mg  400 mg Oral BID    allopurinol (ZYLOPRIM) tablet 300 mg  300 mg Oral DAILY    cholecalciferol (VITAMIN D3) (400 Units /10 mcg) tablet 2 Tab  800 Units Oral DAILY    DULoxetine (CYMBALTA) capsule 30 mg  30 mg Oral DAILY    lidocaine-prilocaine (EMLA) 2.5-2.5 % cream   Topical PRN    LORazepam (ATIVAN) tablet 1 mg  1 mg Oral QHS    potassium chloride (K-DUR, KLOR-CON) SR tablet 20 mEq  20 mEq Oral BID    pravastatin (PRAVACHOL) tablet 10 mg  10 mg Oral QHS    voriconazole (VFEND) tablet 200 mg  200 mg Oral Q12H    ondansetron (ZOFRAN) injection 4 mg  4 mg IntraVENous Q4H PRN    prochlorperazine (COMPAZINE) with saline injection 5 mg  5 mg IntraVENous Q6H PRN    HYDROcodone-acetaminophen (NORCO) 5-325 mg per tablet 1 Tab  1 Tab Oral Q6H PRN    morphine injection 2 mg  2 mg IntraVENous Q4H PRN    0.9% sodium chloride infusion  75 mL/hr IntraVENous CONTINUOUS    enoxaparin (LOVENOX) injection 40 mg  40 mg SubCUTAneous Q24H         ASSESSMENT:    Problem List  Date Reviewed: 12/19/2019          Codes Class Noted    Febrile neutropenia ([facility] ICD-10-CM: D70.9, R50.81  ICD-9-CM: 288.00, 780.61  9/21/2019        Pancytopenia due to antineoplastic chemotherapy [facility]) ICD-10-CM: P59.209, T45.1X5A  ICD-9-CM: 284.11, E933.1  6/12/2019        Cellulitis of neck ICD-10-CM: O69.282  ICD-9-CM: 682.1  6/12/2019        Immunocompromised status associated with infection ICD-10-CM: B99.9  ICD-9-CM: 136.9  6/12/2019        Port or reservoir infection ICD-10-CM: K09.579X  ICD-9-CM: 999.33  6/12/2019        Acute myeloid leukemia not having achieved remission ([facility] ICD-10-CM: C92.00  ICD-9-CM: 205.00  5/9/2019        Admission for antineoplastic chemotherapy ICD-10-CM: Z51.11  ICD-9-CM: V58.11  5/5/2019        AML (acute myeloblastic leukemia) ([facility] ICD-10-CM: C92.00  ICD-9-CM: 205.00  4/28/2019        Weakness generalized ICD-10-CM: R53.1  ICD-9-CM: 780.79  4/28/2019        Pancytopenia ([facility] ICD-10-CM: Y59.440  ICD-9-CM: 284.19  4/28/2019        Thrombocytopenia ([facility] ICD-10-CM: D69.6  ICD-9-CM: 287.5  4/27/2019            Ms. Charleen Kelsey is a 76 y.o. female admitted on 12/30/2019 with a primary diagnosis of There were no encounter diagnoses. .       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 Subsequently on Decitabine plus Gilteritinib x 3 cycles w/ persistent disease. S/p FLAG-VENITA 11/19 w persistent disease though improvement in blast percentage as well as cytopenias. FLAG-VENITA course was complicated by neutropenic fever and E fecalis/alpha strep bacteremia requiring abx course,  ID transitioned to oral Augmentin at discharge which she completed, port was retained. Counts have clearly improved with improvement in [address] and thrombocytopenia however BM biopsy with persistent disease open (cellularity described at 30% with 10-20% residual blasts on IHC). Results shared with patient, various options discussed moving forward. For now she will try to enjoy holidays/Temitope with her family and return next week for likely reinduction. Will consider intermediate dose rivera-C based regimen. Seen today 12/30/2019: Enjoyed Temitope at home. Some fatigue persists. Appetite slowly improving. Discussed various options again, counseled clearly improved: Various options discussed including intermediate dose rivera-C versus reinduction with modified FLAG (will hold off on [place] given past anthracycline exposure), patient has opted for reinduction with FL AG which is reasonable. Denies any recent fevers or chills, okay to admit for reinduction. Continue prophylactic antibiotics. Will repeat BM biopsy on count recovery. She will stay in house until counts recover. PLAN:  AML  - admit for re-induction w modified dose FLAG  12/31 Day 1 FLAG. 1/6 Day 7 FLAG. Tolerating treatment well. Awaiting count recovery. Start Granix. 01/08 Day 9. ANC 1000. On Granix. Ingrown toenail / Cellulitis  12/31 Was going to defer treatment pending podiatrist consult, but no podiatrist will come see pt while IP. Gave pt the option of being discharged to see podiatrist prior to proceeding with treatment vs starting treatment along with Vanc.   Pt prefers to start treatment. Vanc ordered. 1/1 on Vanc. Toe covered with bandaid today when seen. 1/2  Toe appears much improved. Con't Vanc. 1/3 Infx appears resolved. Will continue Vanc through weekend and will likely DC early next week. 1/4 Infx resolved. Plan to DC Vanc after 7 days of treatment. 1/6 Con't Vanc (D6).   1/7 Resolved. Completed Vanc. Bradycardia  1/4 Asymptomatic. EKG with sinus irish. Pancytopenia secondary to chemotherapy  - Transfuse prn per [facility] SOPs      Continue home meds  Prophylactic Antibx: Acyclovir, Voriconazole  [facility] SOPs  Lovenox for DVT prophylaxis (hold for plt <50k)    Disposition:  Discharge pending count recovery. ANC down to 1000 today. On Granix. Will need repeat BMbx on count recovery. RTC within week of discharge. Goals and plan of care reviewed with the patient. All questions answered to the best of our ability. Janay Mckee NP   [facility]  [address] [place] [address]  Office : [phone]  Fax : [phone]   I personally saw, exammed and counselled the patient, and discussed with NP, agree with above history/assessment/plan. 73 y. o.female AML with FLT3 mut s/p multiple lines of chemo and Gilteritinib but refractory, received salvage FLAG day 9, on granix, toe nail cellulitis responded to iv vanc and stop, continue above care, transfuse as needed, counts down, Juwan Newton M.D.   [address]  Office : [phone]  Fax : [phone]

## 2020-01-09 NOTE — PROGRESS NOTES
0600-END OF SHIFT NOTE:    -pt rested well throughout the night  -vss, no needs voiced at this time     Intake/Output  01/08 1901 - 01/09 0700  In: 450 [P.O.:450]  Out: 1300 [Urine:1300]   Voiding: YES  Catheter: NO  Drain:        Stool:  0 occurrences. Stool Assessment  Stool Color: Lawrence Left (01/08/20 1828)  Stool Appearance: Soft (01/08/20 2030)  Stool Amount: Small (01/08/20 1828)  Stool Source/Status: Rectum (01/08/20 1828)    Emesis:  0 occurrences. VITAL SIGNS  Patient Vitals for the past 12 hrs:   Temp Pulse Resp BP SpO2   01/09/20 0303 98.2 °F (36.8 °C) 79 17 127/67 98 %   01/08/20 2200 98 °F (36.7 °C) 100 18 125/76 97 %       Pain Assessment  Pain 1  Pain Scale 1: Visual (01/09/20 0209)  Pain Intensity 1: 0 (01/09/20 0209)  Patient Stated Pain Goal: 0 (01/09/20 0209)  Pain Reassessment 1: Patient resting w/respiratory rate greater than 10 (01/09/20 0209)    Ambulating  Yes    Additional Information:     Shift report given to oncoming nurse at the bedside.     Sienna Abad RN

## 2020-01-09 NOTE — PROGRESS NOTES
MANJU reviewed patient's chart on this date. Patient is currently pending count recovery, and will need repeat bone marrow biopsy on count recovery. No anticipated DC needs at this time. CM team will continue to monitor patient's needs during this admission.

## 2020-01-09 NOTE — PROGRESS NOTES
Problem: Falls - Risk of  Goal: *Absence of Falls  Description  Document Isela Ochoa Fall Risk and appropriate interventions in the flowsheet.   Outcome: Progressing Towards Goal  Note: Fall Risk Interventions:  Mobility Interventions: Communicate number of staff needed for ambulation/transfer    Mentation Interventions: Adequate sleep, hydration, pain control, Evaluate medications/consider consulting pharmacy    Medication Interventions: Teach patient to arise slowly    Elimination Interventions: Call light in reach, Patient to call for help with toileting needs, Toilet paper/wipes in reach    History of Falls Interventions: Room close to nurse's station

## 2020-01-09 NOTE — PROGRESS NOTES
[facility]        Inpatient Hematology / Oncology Progress Note      Admission Date: 2019  5:52 PM  Reason for Admission/Hospital Course: Admission for antineoplastic chemotherapy [Z51.11]      24 Hour Events:  Afebrile, VSS  Day 10 FLAG  Doing well, no complaints  Awaiting count recovery, on Granix   at bedside    ROS:  Constitutional: Negative for fever, chills, weakness, malaise, fatigue. CV: Negative for chest pain, palpitations, edema. Respiratory: Negative for dyspnea, cough, wheezing. GI: Negative for nausea, abdominal pain, diarrhea. 10 point review of systems is otherwise negative with the exception of the elements mentioned above in the HPI. No Known Allergies    OBJECTIVE:  Patient Vitals for the past 8 hrs:   BP Temp Pulse Resp SpO2   20 0757 110/57 98 °F (36.7 °C) (!) 104 18 99 %   20 0303 127/67 98.2 °F (36.8 °C) 79 17 98 %     Temp (24hrs), Av.1 °F (36.7 °C), Min:97.9 °F (36.6 °C), Max:98.3 °F (36.8 °C)     0701 -  1900  In: -   Out: 400 [Urine:400]    Physical Exam:  Constitutional: Well developed, well nourished female in no acute distress, sitting comfortably on the bed   HEENT: Normocephalic and atraumatic. Oropharynx is clear, mucous membranes are moist.  Extraocular muscles are intact. Sclerae anicteric. Skin Warm and dry. No bruising and no rash noted. No pallor. Respiratory Lungs are clear to auscultation bilaterally without wheezes, rales or rhonchi, normal air exchange without accessory muscle use. CVS Normal rate, regular rhythm and normal S1 and S2. No murmurs, gallops, or rubs. Abdomen Soft, nontender and nondistended, normoactive bowel sounds. No palpable mass. No hepatosplenomegaly. Neuro Grossly nonfocal with no obvious sensory or motor deficits. MSK Normal range of motion in general.  No edema and no tenderness. Psych Appropriate mood and affect.         Labs:      Recent Labs 01/09/20 0251 01/08/20 0324 01/07/20  0206   WBC 0.2* 1.1* 2.0*   RBC 2.38* 2.51* 2.73*   HGB 7.6* 8.3* 8.8*   HCT 23.4* 24.7* 26.7*   MCV 98.3* 98.4* 97.8   MCH 31.9 33.1* 32.2   MCHC 32.5 33.6 33.0   RDW 19.2* 19.7* 19.7*   PLT 22* 51* 100*   GRANS  --  94* 96*   LYMPH  --  1* 2*   MONOS  --  0* 1*   EOS  --  0* 0*   BASOS  --  0 0   IG  --  5 1   DF MANUAL AUTOMATED AUTOMATED   ANEU  --  1.0* 2.0   ABL  --  0.0* 0.0*   ABM  --  0.0* 0.0*   ALEKSANDR  --  0.0 0.0   ABB  --  0.0 0.0   AIG  --  0.1 0.0        Recent Labs     01/09/20 0251 01/08/20 0324 01/07/20  0206    139 139   K 4.0 4.2 4.6   * 106 106   CO2 26 26 26   AGAP 6* 7 7   GLU 88 92 91   BUN 20 21 22   CREA 0.60 0.69 0.67   GFRAA >60 >60 >60   GFRNA >60 >60 >60   CA 8.8 9.4 9.2   SGOT 9* 12* 21   AP 77 85 82   TP 5.6* 5.9* 5.9*   ALB 2.8* 3.1* 3.1*   GLOB 2.8 2.8 2.8   AGRAT 1.0* 1.1* 1.1*   MG 1.9 1.9 1.9         Imaging: n/a    Medications:  Current Facility-Administered Medications   Medication Dose Route Frequency    alum-mag hydroxide-simeth (MYLANTA) oral suspension 30 mL  30 mL Oral Q4H PRN    zolpidem (AMBIEN) tablet 10 mg  10 mg Oral QHS PRN    polyethylene glycol (MIRALAX) packet 17 g  17 g Oral DAILY    LORazepam (ATIVAN) injection 0.5 mg  0.5 mg IntraVENous Q6H PRN    prednisoLONE acetate (PRED FORTE) 1 % ophthalmic suspension 2 Drop  2 Drop Both Eyes Q6H    tbo-filgrastim (GRANIX) injection 480 mcg  480 mcg SubCUTAneous QHS    vit C,F-Ff-xrwqc-lutein-zeaxan (PRESERVISION AREDS-2) capsule 1 Cap (Patient Supplied)  1 Cap Oral DAILY    acetaminophen/diphenhydrAMINE (TYLENOL PM EXT STR) 500/25 mg   Oral QHS    acyclovir (ZOVIRAX) tablet 400 mg  400 mg Oral BID    allopurinol (ZYLOPRIM) tablet 300 mg  300 mg Oral DAILY    cholecalciferol (VITAMIN D3) (400 Units /10 mcg) tablet 2 Tab  800 Units Oral DAILY    DULoxetine (CYMBALTA) capsule 30 mg  30 mg Oral DAILY    lidocaine-prilocaine (EMLA) 2.5-2.5 % cream   Topical PRN  LORazepam (ATIVAN) tablet 1 mg  1 mg Oral QHS    potassium chloride (K-DUR, KLOR-CON) SR tablet 20 mEq  20 mEq Oral BID    pravastatin (PRAVACHOL) tablet 10 mg  10 mg Oral QHS    voriconazole (VFEND) tablet 200 mg  200 mg Oral Q12H    ondansetron (ZOFRAN) injection 4 mg  4 mg IntraVENous Q4H PRN    prochlorperazine (COMPAZINE) with saline injection 5 mg  5 mg IntraVENous Q6H PRN    HYDROcodone-acetaminophen (NORCO) 5-325 mg per tablet 1 Tab  1 Tab Oral Q6H PRN    morphine injection 2 mg  2 mg IntraVENous Q4H PRN    0.9% sodium chloride infusion  75 mL/hr IntraVENous CONTINUOUS         ASSESSMENT:    Problem List  Date Reviewed: 12/19/2019          Codes Class Noted    Febrile neutropenia ([facility] ICD-10-CM: D70.9, R50.81  ICD-9-CM: 288.00, 780.61  9/21/2019        Pancytopenia due to antineoplastic chemotherapy [facility]) ICD-10-CM: S22.422, T45.1X5A  ICD-9-CM: 284.11, E933.1  6/12/2019        Cellulitis of neck ICD-10-CM: L64.152  ICD-9-CM: 682.1  6/12/2019        Immunocompromised status associated with infection ICD-10-CM: B99.9  ICD-9-CM: 136.9  6/12/2019        Port or reservoir infection ICD-10-CM: V62.989H  ICD-9-CM: 999.33  6/12/2019        Acute myeloid leukemia not having achieved remission ([facility] ICD-10-CM: C92.00  ICD-9-CM: 205.00  5/9/2019        Admission for antineoplastic chemotherapy ICD-10-CM: Z51.11  ICD-9-CM: V58.11  5/5/2019        AML (acute myeloblastic leukemia) ([facility] ICD-10-CM: C92.00  ICD-9-CM: 205.00  4/28/2019        Weakness generalized ICD-10-CM: R53.1  ICD-9-CM: 780.79  4/28/2019        Pancytopenia ([facility] ICD-10-CM: B39.315  ICD-9-CM: 284.19  4/28/2019        Thrombocytopenia ([facility] ICD-10-CM: D69.6  ICD-9-CM: 287.5  4/27/2019            Ms. Lennie Corea is a 76 y.o. female admitted on 12/30/2019 with a primary diagnosis of There were no encounter diagnoses. .       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  Subsequently on Decitabine plus Gilteritinib x 3 cycles w/ persistent disease. S/p FLAG-VENITA 11/19 w persistent disease though improvement in blast percentage as well as cytopenias. FLAG-VENITA course was complicated by neutropenic fever and E fecalis/alpha strep bacteremia requiring abx course,  ID transitioned to oral Augmentin at discharge which she completed, port was retained. Counts have clearly improved with improvement in [address] and thrombocytopenia however BM biopsy with persistent disease open (cellularity described at 30% with 10-20% residual blasts on IHC). Results shared with patient, various options discussed moving forward. For now she will try to enjoy holidays/[place] with her family and return next week for likely reinduction. Will consider intermediate dose rivera-C based regimen. Seen today 12/30/2019: Enjoyed [place] at home. Some fatigue persists. Appetite slowly improving. Discussed various options again, counseled clearly improved: Various options discussed including intermediate dose rivera-C versus reinduction with modified FLAG (will hold off on Maria C Gosselin given past anthracycline exposure), patient has opted for reinduction with FL AG which is reasonable. Denies any recent fevers or chills, okay to admit for reinduction. Continue prophylactic antibiotics. Will repeat BM biopsy on count recovery. She will stay in house until counts recover. PLAN:  AML  - admit for re-induction w modified dose FLAG  12/31 Day 1 FLAG. 1/6 Day 7 FLAG. Tolerating treatment well. Awaiting count recovery. Start Granix. 01/08 Day 9. ANC 1000. On Granix. 1/9 Day 10. . Awaiting count recovery. On Granix. Ingrown toenail / Cellulitis  12/31 Was going to defer treatment pending podiatrist consult, but no podiatrist will come see pt while IP. Gave pt the option of being discharged to see podiatrist prior to proceeding with treatment vs starting treatment along with Vanc. Pt prefers to start treatment.   Vanc ordered. 1/1 on Vanc. Toe covered with bandaid today when seen. 1/2  Toe appears much improved. Con't Vanc. 1/3 Infx appears resolved. Will continue Vanc through weekend and will likely DC early next week. 1/4 Infx resolved. Plan to DC Vanc after 7 days of treatment. 1/6 Con't Vanc (D6).   1/7 Resolved. Completed Vanc. Bradycardia  1/4 Asymptomatic. EKG with sinus irish. Pancytopenia secondary to chemotherapy  - Transfuse prn per [facility] SOPs      Continue home meds  Prophylactic Antibx: Acyclovir, Voriconazole  [facility] SOPs  SCDs for DVT prophylaxis (AC contraindicated d/t thrombocytopenia)    Disposition:  Discharge pending count recovery. WBC down to 200. On Granix. Will need repeat BMbx on count recovery. RTC within week of discharge. Goals and plan of care reviewed with the patient. All questions answered to the best of our ability. Doris Vega NP   [facility]emat[facility] On[facility]  [address] [place] [address]  Office : [phone]  Fax : [phone]   I personally saw, exammed and counselled the patient, and discussed with NP, agree with above history/assessment/plan. 73 y. o.female AML with FLT3 mut s/p multiple lines of chemo and Gilteritinib but refractory, received salvage FLAG day 10, on granix, toe nail cellulitis responded to iv vanc and stop, continue above care, transfuse as needed, counts down, SOP.       Jeri Torrez M.D.   [address]  Office : [phone]  Fax : [phone]

## 2020-01-10 NOTE — PROGRESS NOTES
Pt complaining of itching all over. NP alerted, platelets transfusion completed 15 minutes prior. IV 25 mg benadryl ordered and administered. Pt re-evaluated and stated she felt better.

## 2020-01-10 NOTE — PROGRESS NOTES
0600-END OF SHIFT NOTE:    -pt rested well throughout the night  -1u platelets ordered per protocol  -r big toe had increased redness than prior night  -vss, no needs voiced at this time     Intake/Output  01/09 1901 - 01/10 0700  In: 250 [P.O.:250]  Out: 1225 [Urine:1225]   Voiding: YES  Catheter: NO  Drain:        Stool:  0 occurrences. Stool Assessment  Stool Color: Farley Buster (01/08/20 1828)  Stool Appearance: Soft (01/09/20 0810)  Stool Amount: Small (01/08/20 1828)  Stool Source/Status: Rectum (01/08/20 1828)    Emesis:  0 occurrences. VITAL SIGNS  Patient Vitals for the past 12 hrs:   Temp Pulse Resp BP SpO2   01/10/20 0305 98.1 °F (36.7 °C) 98 18 129/65 98 %   01/09/20 2320 98.4 °F (36.9 °C) 98 18 129/62 93 %   01/09/20 1937 98.4 °F (36.9 °C) (!) 108 18 143/60 98 %       Pain Assessment  Pain 1  Pain Scale 1: Numeric (0 - 10) (01/10/20 0230)  Pain Intensity 1: 0 (01/10/20 0230)  Patient Stated Pain Goal: 0 (01/10/20 0230)  Pain Reassessment 1: Patient resting w/respiratory rate greater than 10 (01/09/20 0209)    Ambulating  Yes    Additional Information:     Shift report given to oncoming nurse at the bedside.     Christopher Vaughn RN

## 2020-01-10 NOTE — PROGRESS NOTES
END OF SHIFT NOTE:    Intake/Output  01/10 0701 - 01/10 1900  In: 5734 [P.O.:1078]  Out: 600 [Urine:600]   Voiding: YES  Catheter: NO  Drain:              Stool:  1 occurrences. Stool Assessment  Stool Color: Carla Rangel (01/08/20 1828)  Stool Appearance: Soft(per pt) (01/10/20 0732)  Stool Amount: Small (01/08/20 1828)  Stool Source/Status: Rectum (01/08/20 1828)    Emesis:  0 occurrences. VITAL SIGNS  Patient Vitals for the past 12 hrs:   Temp Pulse Resp BP SpO2   01/10/20 1556 98.6 °F (37 °C) 98 18 131/64 97 %   01/10/20 1350 99.2 °F (37.3 °C) 92 17 143/63 99 %   01/10/20 1315 99.5 °F (37.5 °C) 98 19 142/63 98 %   01/10/20 1258 99.1 °F (37.3 °C) 98 18 140/64 98 %   01/10/20 1146 98.2 °F (36.8 °C) 87 18 129/65 99 %   01/10/20 0731 98.3 °F (36.8 °C) 82 17 144/63        Pain Assessment  Pain 1  Pain Scale 1: Numeric (0 - 10) (01/10/20 0915)  Pain Intensity 1: 0 (01/10/20 0915)  Patient Stated Pain Goal: 0 (01/10/20 0915)  Pain Reassessment 1: Patient resting w/respiratory rate greater than 10 (01/09/20 0209)    Ambulating  Yes    Additional Information: Pt resting in bed. Pt received one unit of platelets. About 15 minutes post platelet infusion Pt complained of itching. NP alerted, gave benadryl 25 mg IV. Pt stated that the itching resolved with the IV medication. No other needs at this time. Shift report given to oncoming nurse at the bedside.     Roamn Ortiz

## 2020-01-10 NOTE — PROGRESS NOTES
[facility] Hematology & Oncology        Inpatient Hematology / Oncology Progress Note      Admission Date: 2019  5:52 PM  Reason for Admission/Hospital Course: Admission for antineoplastic chemotherapy [Z51.11]      24 Hour Events:  Afebrile, VSS  Day 11 FLAG  Doing well, no complaints  Awaiting count recovery, on Granix   at bedside    ROS:  Constitutional: Negative for fever, chills, weakness, malaise, fatigue. CV: Negative for chest pain, palpitations, edema. Respiratory: Negative for dyspnea, cough, wheezing. GI: Negative for nausea, abdominal pain, diarrhea. 10 point review of systems is otherwise negative with the exception of the elements mentioned above in the HPI. No Known Allergies    OBJECTIVE:  Patient Vitals for the past 8 hrs:   BP Temp Pulse Resp SpO2   01/10/20 0731 144/63 98.3 °F (36.8 °C) 82 17    01/10/20 0305 129/65 98.1 °F (36.7 °C) 98 18 98 %     Temp (24hrs), Av.2 °F (36.8 °C), Min:98 °F (36.7 °C), Max:98.4 °F (36.9 °C)    01/10 07 - 01/10 1900  In: 0 [P.O.:598]  Out: -     Physical Exam:  Constitutional: Well developed, well nourished female in no acute distress, sitting comfortably on the bed   HEENT: Normocephalic and atraumatic. Oropharynx is clear, mucous membranes are moist.  Extraocular muscles are intact. Sclerae anicteric. Skin Warm and dry. No bruising and no rash noted. No pallor. Respiratory Lungs are clear to auscultation bilaterally without wheezes, rales or rhonchi, normal air exchange without accessory muscle use. CVS Normal rate, regular rhythm and normal S1 and S2. No murmurs, gallops, or rubs. Abdomen Soft, nontender and nondistended, normoactive bowel sounds. No palpable mass. No hepatosplenomegaly. Neuro Grossly nonfocal with no obvious sensory or motor deficits. MSK Normal range of motion in general.  No edema and no tenderness. Psych Appropriate mood and affect.         Labs:      Recent Labs     01/10/20  2528 01/09/20  0251 01/08/20  0324   WBC 0.0* 0.2* 1.1*   RBC 2.23* 2.38* 2.51*   HGB 7.4* 7.6* 8.3*   HCT 22.0* 23.4* 24.7*   MCV 98.7* 98.3* 98.4*   MCH 33.2* 31.9 33.1*   MCHC 33.6 32.5 33.6   RDW 18.7* 19.2* 19.7*   PLT 9* 22* 51*   GRANS 50  --  94*   LYMPH 50*  --  1*   MONOS 0*  --  0*   EOS 0*  --  0*   BASOS 0  --  0   IG 0  --  5   DF AUTOMATED MANUAL AUTOMATED   ANEU 0.0*  --  1.0*   ABL 0.0*  --  0.0*   ABM 0.0*  --  0.0*   ALEKSANDR 0.0  --  0.0   ABB 0.0  --  0.0   AIG 0.0  --  0.1        Recent Labs     01/10/20  0317 01/09/20  0251 01/08/20  0324    141 139   K 4.2 4.0 4.2    109* 106   CO2 29 26 26   AGAP 5* 6* 7   GLU 98 88 92   BUN 19 20 21   CREA 0.67 0.60 0.69   GFRAA >60 >60 >60   GFRNA >60 >60 >60   CA 9.1 8.8 9.4   SGOT 10* 9* 12*   AP 89 77 85   TP 5.9* 5.6* 5.9*   ALB 3.0* 2.8* 3.1*   GLOB 2.9 2.8 2.8   AGRAT 1.0* 1.0* 1.1*   MG 1.9 1.9 1.9         Imaging: n/a    Medications:  Current Facility-Administered Medications   Medication Dose Route Frequency    alum-mag hydroxide-simeth (MYLANTA) oral suspension 30 mL  30 mL Oral Q4H PRN    zolpidem (AMBIEN) tablet 10 mg  10 mg Oral QHS PRN    polyethylene glycol (MIRALAX) packet 17 g  17 g Oral DAILY    LORazepam (ATIVAN) injection 0.5 mg  0.5 mg IntraVENous Q6H PRN    tbo-filgrastim (GRANIX) injection 480 mcg  480 mcg SubCUTAneous QHS    vit C,D-Gl-evqlr-lutein-zeaxan (PRESERVISION AREDS-2) capsule 1 Cap (Patient Supplied)  1 Cap Oral DAILY    acetaminophen/diphenhydrAMINE (TYLENOL PM EXT STR) 500/25 mg   Oral QHS    acyclovir (ZOVIRAX) tablet 400 mg  400 mg Oral BID    allopurinol (ZYLOPRIM) tablet 300 mg  300 mg Oral DAILY    cholecalciferol (VITAMIN D3) (400 Units /10 mcg) tablet 2 Tab  800 Units Oral DAILY    DULoxetine (CYMBALTA) capsule 30 mg  30 mg Oral DAILY    lidocaine-prilocaine (EMLA) 2.5-2.5 % cream   Topical PRN    LORazepam (ATIVAN) tablet 1 mg  1 mg Oral QHS    potassium chloride (K-DUR, KLOR-CON) SR tablet 20 mEq 20 mEq Oral BID    pravastatin (PRAVACHOL) tablet 10 mg  10 mg Oral QHS    voriconazole (VFEND) tablet 200 mg  200 mg Oral Q12H    ondansetron (ZOFRAN) injection 4 mg  4 mg IntraVENous Q4H PRN    prochlorperazine (COMPAZINE) with saline injection 5 mg  5 mg IntraVENous Q6H PRN    HYDROcodone-acetaminophen (NORCO) 5-325 mg per tablet 1 Tab  1 Tab Oral Q6H PRN    morphine injection 2 mg  2 mg IntraVENous Q4H PRN    0.9% sodium chloride infusion  75 mL/hr IntraVENous CONTINUOUS         ASSESSMENT:    Problem List  Date Reviewed: 12/19/2019          Codes Class Noted    Febrile neutropenia ([facility] ICD-10-CM: D70.9, R50.81  ICD-9-CM: 288.00, 780.61  9/21/2019        Pancytopenia due to antineoplastic chemotherapy [facility]) ICD-10-CM: J56.018, T45.1X5A  ICD-9-CM: 284.11, E933.1  6/12/2019        Cellulitis of neck ICD-10-CM: P18.146  ICD-9-CM: 682.1  6/12/2019        Immunocompromised status associated with infection ICD-10-CM: B99.9  ICD-9-CM: 136.9  6/12/2019        Port or reservoir infection ICD-10-CM: W87.338M  ICD-9-CM: 999.33  6/12/2019        Acute myeloid leukemia not having achieved remission ([facility] ICD-10-CM: C92.00  ICD-9-CM: 205.00  5/9/2019        Admission for antineoplastic chemotherapy ICD-10-CM: Z51.11  ICD-9-CM: V58.11  5/5/2019        AML (acute myeloblastic leukemia) ([facility] ICD-10-CM: C92.00  ICD-9-CM: 205.00  4/28/2019        Weakness generalized ICD-10-CM: R53.1  ICD-9-CM: 780.79  4/28/2019        Pancytopenia ([facility] ICD-10-CM: D67.455  ICD-9-CM: 284.19  4/28/2019        Thrombocytopenia ([facility] ICD-10-CM: D69.6  ICD-9-CM: 287.5  4/27/2019            Ms. Shane Medina is a 76 y.o. female admitted on 12/30/2019 with a primary diagnosis of There were no encounter diagnoses. .       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 Subsequently on Decitabine plus Gilteritinib x 3 cycles w/ persistent disease.  S/p FLAG-VENITA 11/19 w persistent disease though improvement in blast percentage as well as cytopenias. FLAG-VENITA course was complicated by neutropenic fever and E fecalis/alpha strep bacteremia requiring abx course,  ID transitioned to oral Augmentin at discharge which she completed, port was retained. Counts have clearly improved with improvement in [address] and thrombocytopenia however BM biopsy with persistent disease open (cellularity described at 30% with 10-20% residual blasts on IHC). Results shared with patient, various options discussed moving forward. For now she will try to enjoy holidays/[place] with her family and return next week for likely reinduction. Will consider intermediate dose rivera-C based regimen. Seen today 12/30/2019: Enjoyed [place] at home. Some fatigue persists. Appetite slowly improving. Discussed various options again, counseled clearly improved: Various options discussed including intermediate dose rivera-C versus reinduction with modified FLAG (will hold off on Darletta Marce given past anthracycline exposure), patient has opted for reinduction with FL AG which is reasonable. Denies any recent fevers or chills, okay to admit for reinduction. Continue prophylactic antibiotics. Will repeat BM biopsy on count recovery. She will stay in house until counts recover. PLAN:  AML  - admit for re-induction w modified dose FLAG  12/31 Day 1 FLAG. 1/6 Day 7 FLAG. Tolerating treatment well. Awaiting count recovery. Start Granix. 1/10 Day 11. Awaiting count recovery. On Granix. Ingrown toenail / Cellulitis  12/31 Was going to defer treatment pending podiatrist consult, but no podiatrist will come see pt while IP. Gave pt the option of being discharged to see podiatrist prior to proceeding with treatment vs starting treatment along with Vanc. Pt prefers to start treatment. Vanc ordered. 1/1 on Vanc. Toe covered with bandaid today when seen. 1/2  Toe appears much improved. Con't Vanc. 1/3 Infx appears resolved.   Will continue Vanc through weekend and will likely DC early next week. 1/4 Infx resolved. Plan to DC Vanc after 7 days of treatment. 1/6 Con't Vanc (D6).   1/7 Resolved. Completed Vanc. Bradycardia  1/4 Asymptomatic. EKG with sinus irish. Pancytopenia secondary to chemotherapy  - Transfuse prn per [facility] SOPs      Continue home meds  Prophylactic Antibx: Acyclovir, Voriconazole  [facility] SOPs  SCDs for DVT prophylaxis (AC contraindicated d/t thrombocytopenia)    Disposition:  Discharge pending count recovery. On Granix. Will need repeat BMbx on count recovery. RTC within week of discharge. Goals and plan of care reviewed with the patient. All questions answered to the best of our ability. Kristofer Caruso, ARMANDO   [facility] Hematology & Oncology  [address] [place] [address]  Office : [phone]  Fax : [phone]   I personally saw, exammed and counselled the patient, and discussed with NP, agree with above history/assessment/plan. 73 y.o. female AML with FLT3 mut s/p multiple lines of chemo and Gilteritinib but refractory, received salvage FLAG day 11, on granix, toe nail cellulitis responded to iv vanc and stop, kicked cart twice and toe swollen again, use neosporin gel, continue above care, transfuse as needed, counts down, SOP.       Marques Fletcher M.D.   [address]  Office : [phone]  Fax : [phone]

## 2020-01-10 NOTE — PROGRESS NOTES
Problem: Falls - Risk of  Goal: *Absence of Falls  Description  Document Jemma Interiano Fall Risk and appropriate interventions in the flowsheet.   Outcome: Progressing Towards Goal  Note: Fall Risk Interventions:  Mobility Interventions: Communicate number of staff needed for ambulation/transfer, Patient to call before getting OOB    Mentation Interventions: Adequate sleep, hydration, pain control    Medication Interventions: Teach patient to arise slowly, Patient to call before getting OOB    Elimination Interventions: Call light in reach, Patient to call for help with toileting needs    History of Falls Interventions: Room close to nurse's station         Problem: Patient Education: Go to Patient Education Activity  Goal: Patient/Family Education  Outcome: Progressing Towards Goal     Problem: Chemotherapy, Day 3 to Discharge  Goal: Activity/Safety  Outcome: Progressing Towards Goal  Goal: Consults, if ordered  Outcome: Progressing Towards Goal  Goal: Diagnostic Test/Procedures  Outcome: Progressing Towards Goal  Goal: Nutrition/Diet  Outcome: Progressing Towards Goal  Goal: Discharge Planning  Outcome: Progressing Towards Goal  Goal: Medications  Outcome: Progressing Towards Goal  Goal: Respiratory  Outcome: Progressing Towards Goal  Goal: Treatments/Interventions/Procedures  Outcome: Progressing Towards Goal  Goal: Psychosocial  Outcome: Progressing Towards Goal  Goal: *Optimal pain control at patient's stated goal  Outcome: Progressing Towards Goal  Goal: *Hemodynamically stable  Outcome: Progressing Towards Goal  Goal: *Adequate oxygenation  Outcome: Progressing Towards Goal     Problem: Chemotherapy Discharge Outcomes  Goal: *Verbalizes understanding and describes prescribed diet  Outcome: Progressing Towards Goal  Goal: *Describes follow-up/return visits to physicians  Outcome: Progressing Towards Goal  Goal: *Describes home care/support arrangements established based on need  Outcome: Progressing Towards Goal  Goal: *Describes available resources and support systems  Outcome: Progressing Towards Goal  Goal: *Anxiety reduced or absent  Outcome: Progressing Towards Goal  Goal: *Understands and describes signs and symptoms to report to providers(Stroke Metric)  Outcome: Progressing Towards Goal  Goal: *Hemodynamically stable  Outcome: Progressing Towards Goal  Goal: *Tolerating diet  Outcome: Progressing Towards Goal  Goal: *Verbalizes understanding and describes medication purposes and frequencies  Outcome: Progressing Towards Goal  Goal: *Venous access site with positive blood return and without signs and symptoms of infection  Outcome: Progressing Towards Goal  Goal: *Verbalizes understanding of bowel regimen  Outcome: Progressing Towards Goal     Problem: Nutrition Deficit  Goal: *Optimize nutritional status  Outcome: Progressing Towards Goal

## 2020-01-11 NOTE — PROGRESS NOTES
END OF SHIFT NOTE:    Intake/Output  01/10 1901 - 01/11 0700  In: 480 [P.O.:480]  Out: 1550 [Urine:1550]   Voiding: YES  Catheter: YES  Drain:              Stool:  0 occurrences. Stool Assessment  Stool Color: Oval Bach (01/08/20 1828)  Stool Appearance: Soft(per pt) (01/10/20 0732)  Stool Amount: Small (01/08/20 1828)  Stool Source/Status: Rectum (01/08/20 1828)    Emesis:  0 occurrences. VITAL SIGNS  Patient Vitals for the past 12 hrs:   Temp Pulse Resp BP SpO2   01/11/20 0240 98.3 °F (36.8 °C) 93 16 134/63 98 %   01/10/20 2345 99 °F (37.2 °C)       01/10/20 2250 100 °F (37.8 °C) 94 18 119/63 97 %   01/10/20 1938 98.8 °F (37.1 °C) 100 16 132/71 99 %       Pain Assessment  Pain 1  Pain Scale 1: Numeric (0 - 10) (01/11/20 0220)  Pain Intensity 1: 0 (01/11/20 0220)  Patient Stated Pain Goal: 0 (01/11/20 0220)  Pain Reassessment 1: Patient resting w/respiratory rate greater than 10 (01/09/20 0209)    Ambulating  Yes    Additional Information:   TMax 100. No bleeding. No new complaints. For 1 unit PRBC today for hgb 7.0 per SOP. Shift report given to oncoming nurse Becca RN at the bedside.     Luis F Hunt RN

## 2020-01-11 NOTE — PROGRESS NOTES
[facility] Hematology & Oncology        Inpatient Hematology / Oncology Progress Note      Admission Date: 2019  5:52 PM  Reason for Admission/Hospital Course: Admission for antineoplastic chemotherapy [Z51.11]      24 Hour Events:  Afebrile, VSS  Day 13 FLAG  L first toe appears more erythematous today  Awaiting count recovery, on Granix   at bedside    ROS:  Constitutional: Negative for fever, chills, weakness, malaise, fatigue. CV: Negative for chest pain, palpitations, edema. Respiratory: Negative for dyspnea, cough, wheezing. GI: Negative for nausea, abdominal pain, diarrhea. 10 point review of systems is otherwise negative with the exception of the elements mentioned above in the HPI. No Known Allergies    OBJECTIVE:  Patient Vitals for the past 8 hrs:   BP Temp Pulse Resp SpO2   20 0812 107/66 97.9 °F (36.6 °C) (!) 102 18 100 %   20 0240 134/63 98.3 °F (36.8 °C) 93 16 98 %     Temp (24hrs), Av.9 °F (37.2 °C), Min:97.9 °F (36.6 °C), Max:100 °F (37.8 °C)     0701 -  1900  In: 240 [P.O.:240]  Out: -     Physical Exam:  Constitutional: Well developed, well nourished female in no acute distress, sitting comfortably on the bed   HEENT: Normocephalic and atraumatic. Oropharynx is clear, mucous membranes are moist.  Extraocular muscles are intact. Sclerae anicteric. Skin Warm and dry. No bruising and no rash noted. No pallor. +L first toe with increased erythema. Respiratory Lungs are clear to auscultation bilaterally without wheezes, rales or rhonchi, normal air exchange without accessory muscle use. CVS Normal rate, regular rhythm and normal S1 and S2. No murmurs, gallops, or rubs. Abdomen Soft, nontender and nondistended, normoactive bowel sounds. No palpable mass. No hepatosplenomegaly. Neuro Grossly nonfocal with no obvious sensory or motor deficits. MSK Normal range of motion in general.  No edema and no tenderness.    Psych Appropriate mood and affect.         Labs:      Recent Labs     01/11/20  0248 01/10/20  0317 01/09/20  0251   WBC 0.0* 0.0* 0.2*   RBC 2.18* 2.23* 2.38*   HGB 7.0* 7.4* 7.6*   HCT 21.4* 22.0* 23.4*   MCV 98.2* 98.7* 98.3*   MCH 32.1 33.2* 31.9   MCHC 32.7 33.6 32.5   RDW 18.6* 18.7* 19.2*   PLT 32* 9* 22*   GRANS  --  50  --    LYMPH  --  50*  --    MONOS  --  0*  --    EOS  --  0*  --    BASOS  --  0  --    IG  --  0  --    DF MANUAL AUTOMATED MANUAL   ANEU  --  0.0*  --    ABL  --  0.0*  --    ABM  --  0.0*  --    ALEKSANDR  --  0.0  --    ABB  --  0.0  --    AIG  --  0.0  --         Recent Labs     01/11/20  0248 01/10/20  0317 01/09/20  0251    141 141   K 4.1 4.2 4.0    107 109*   CO2 27 29 26   AGAP 6* 5* 6*    98 88   BUN 22 19 20   CREA 0.71 0.67 0.60   GFRAA >60 >60 >60   GFRNA >60 >60 >60   CA 9.4 9.1 8.8   SGOT 12* 10* 9*   AP 90 89 77   TP 6.1* 5.9* 5.6*   ALB 3.2 3.0* 2.8*   GLOB 2.9 2.9 2.8   AGRAT 1.1* 1.0* 1.0*   MG 2.1 1.9 1.9         Imaging: n/a    Medications:  Current Facility-Administered Medications   Medication Dose Route Frequency    0.9% sodium chloride infusion 250 mL  250 mL IntraVENous PRN    levoFLOXacin (LEVAQUIN) tablet 500 mg  500 mg Oral Q24H    diphenhydrAMINE (BENADRYL) capsule 25 mg  25 mg Oral Q6H PRN    acetaminophen (TYLENOL) tablet 650 mg  650 mg Oral Q6H PRN    central line flush (saline) syringe 10 mL  10 mL InterCATHeter PRN    heparin (porcine) pf 300 Units  300 Units InterCATHeter PRN    alum-mag hydroxide-simeth (MYLANTA) oral suspension 30 mL  30 mL Oral Q4H PRN    zolpidem (AMBIEN) tablet 10 mg  10 mg Oral QHS PRN    polyethylene glycol (MIRALAX) packet 17 g  17 g Oral DAILY    LORazepam (ATIVAN) injection 0.5 mg  0.5 mg IntraVENous Q6H PRN    tbo-filgrastim (GRANIX) injection 480 mcg  480 mcg SubCUTAneous QHS    vit C,S-Fj-zewki-lutein-zeaxan (PRESERVISION AREDS-2) capsule 1 Cap (Patient Supplied)  1 Cap Oral DAILY    acetaminophen/diphenhydrAMINE (TYLENOL PM EXT STR) 500/25 mg   Oral QHS    acyclovir (ZOVIRAX) tablet 400 mg  400 mg Oral BID    allopurinol (ZYLOPRIM) tablet 300 mg  300 mg Oral DAILY    cholecalciferol (VITAMIN D3) (400 Units /10 mcg) tablet 2 Tab  800 Units Oral DAILY    DULoxetine (CYMBALTA) capsule 30 mg  30 mg Oral DAILY    lidocaine-prilocaine (EMLA) 2.5-2.5 % cream   Topical PRN    LORazepam (ATIVAN) tablet 1 mg  1 mg Oral QHS    potassium chloride (K-DUR, KLOR-CON) SR tablet 20 mEq  20 mEq Oral BID    pravastatin (PRAVACHOL) tablet 10 mg  10 mg Oral QHS    voriconazole (VFEND) tablet 200 mg  200 mg Oral Q12H    ondansetron (ZOFRAN) injection 4 mg  4 mg IntraVENous Q4H PRN    prochlorperazine (COMPAZINE) with saline injection 5 mg  5 mg IntraVENous Q6H PRN    HYDROcodone-acetaminophen (NORCO) 5-325 mg per tablet 1 Tab  1 Tab Oral Q6H PRN    morphine injection 2 mg  2 mg IntraVENous Q4H PRN    0.9% sodium chloride infusion  75 mL/hr IntraVENous CONTINUOUS         ASSESSMENT:    Problem List  Date Reviewed: 12/19/2019          Codes Class Noted    Febrile neutropenia ([facility] ICD-10-CM: D70.9, R50.81  ICD-9-CM: 288.00, 780.61  9/21/2019        Pancytopenia due to antineoplastic chemotherapy [facility]) ICD-10-CM: J01.196, T45.1X5A  ICD-9-CM: 284.11, E933.1  6/12/2019        Cellulitis of neck ICD-10-CM: N02.367  ICD-9-CM: 682.1  6/12/2019        Immunocompromised status associated with infection ICD-10-CM: B99.9  ICD-9-CM: 136.9  6/12/2019        Port or reservoir infection ICD-10-CM: F01.549T  ICD-9-CM: 999.33  6/12/2019        Acute myeloid leukemia not having achieved remission ([facility] ICD-10-CM: C92.00  ICD-9-CM: 205.00  5/9/2019        Admission for antineoplastic chemotherapy ICD-10-CM: Z51.11  ICD-9-CM: V58.11  5/5/2019        AML (acute myeloblastic leukemia) ([facility] ICD-10-CM: C92.00  ICD-9-CM: 205.00  4/28/2019        Weakness generalized ICD-10-CM: R53.1  ICD-9-CM: 780.79  4/28/2019        Pancytopenia ([facility] ICD-10-CM: Z44.064  ICD-9-CM: 284.19  4/28/2019        Thrombocytopenia [facility]) ICD-10-CM: D69.6  ICD-9-CM: 287.5  4/27/2019            Ms. Nell Crawford is a 76 y.o. female admitted on 12/30/2019 with a primary diagnosis of There were no encounter diagnoses. .       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 Subsequently on Decitabine plus Gilteritinib x 3 cycles w/ persistent disease. S/p FLAG-VENITA 11/19 w persistent disease though improvement in blast percentage as well as cytopenias. FLAG-VENITA course was complicated by neutropenic fever and E fecalis/alpha strep bacteremia requiring abx course,  ID transitioned to oral Augmentin at discharge which she completed, port was retained. Counts have clearly improved with improvement in [address] and thrombocytopenia however BM biopsy with persistent disease open (cellularity described at 30% with 10-20% residual blasts on IHC). Results shared with patient, various options discussed moving forward. For now she will try to enjoy holidays/[place] with her family and return next week for likely reinduction. Will consider intermediate dose rivera-C based regimen. Seen today 12/30/2019: Enjoyed Temitope at home. Some fatigue persists. Appetite slowly improving. Discussed various options again, counseled clearly improved: Various options discussed including intermediate dose rivera-C versus reinduction with modified FLAG (will hold off on Darletta Marce given past anthracycline exposure), patient has opted for reinduction with FL AG which is reasonable. Denies any recent fevers or chills, okay to admit for reinduction. Continue prophylactic antibiotics. Will repeat BM biopsy on count recovery. She will stay in house until counts recover. PLAN:  AML  - admit for re-induction w modified dose FLAG  12/31 Day 1 FLAG. 1/6 Day 7 FLAG. Tolerating treatment well. Awaiting count recovery. Start Granix. 1/11 Day 12. Awaiting count recovery.   On Granix. Ingrown toenail / Cellulitis  12/31 Was going to defer treatment pending podiatrist consult, but no podiatrist will come see pt while IP. Gave pt the option of being discharged to see podiatrist prior to proceeding with treatment vs starting treatment along with Vanc. Pt prefers to start treatment. Vanc ordered. 1/1 on Vanc. Toe covered with bandaid today when seen. 1/2  Toe appears much improved. Con't Vanc. 1/3 Infx appears resolved. Will continue Vanc through weekend and will likely DC early next week. 1/4 Infx resolved. Plan to DC Vanc after 7 days of treatment. 1/6 Con't Vanc (D6).   1/7 Resolved. Completed Vanc. 1/11 L first toe appears more erythematous today. Pt reports bumping it several times on side table yesterday. Started using neosporin yesterday. Will add prophylactic Levaquin. Bradycardia  1/4 Asymptomatic. EKG with sinus irish. Pancytopenia secondary to chemotherapy  - Transfuse prn per [facility] SOPs  1/11 Transfuse 1 unit PRBCs. Continue home meds  Prophylactic Antibx: Acyclovir, Voriconazole, Levaquin  [facility] SOPs  SCDs for DVT prophylaxis (AC contraindicated d/t thrombocytopenia)    Disposition:  Discharge pending count recovery. On Granix. Will need repeat BMbx on count recovery. RTC within week of discharge. Goals and plan of care reviewed with the patient. All questions answered to the best of our ability. I personally saw, exammed and counselled the patient, and discussed with NP, agree with above history/assessment/plan. 73 y.o. female AML with FLT3 mut s/p multiple lines of chemo and Gilteritinib but refractory, received salvage FLAG day 1, on granix, toe nail cellulitis responded to iv vanc and stop, kicked cart twice and toe swollen again, added levaquine back, still swollen, continue above care, transfuse as needed, counts down, SOP.       Zak Roberson M.D.   [facility] [address] [address]  Office : [phone]  Fax : [phone]

## 2020-01-12 NOTE — PROGRESS NOTES
[facility]        Inpatient Hematology / Oncology Progress Note      Admission Date: 2019  5:52 PM  Reason for Admission/Hospital Course: Admission for antineoplastic chemotherapy [Z51.11]      24 Hour Events:  Afebril4 FLAG  L first toe appears erythematous about the same  Awaiting count recovery, on Granix   at bedside    ROS:  Constitutional: Negative for fever, chills, weakness, malaise, fatigue. CV: Negative for chest pain, palpitations, edema. Respiratory: Negative for dyspnea, cough, wheezing. GI: Negative for nausea, abdominal pain, diarrhea. 10 point review of systems is otherwise negative with the exception of the elements mentioned above in the HPI. No Known Allergies    OBJECTIVE:  Patient Vitals for the past 8 hrs:   BP Temp Pulse Resp SpO2   20 1012 119/65 98.2 °F (36.8 °C) 99 18 99 %   20 0753 140/86  99 16 98 %     Temp (24hrs), Av.6 °F (37 °C), Min:98.2 °F (36.8 °C), Max:99.1 °F (37.3 °C)     0701 -  1900  In: 600 [P.O.:600]  Out: 300 [Urine:300]    Physical Exam:  Constitutional: Well developed, well nourished female in no acute distress, sitting comfortably on the bed   HEENT: Normocephalic and atraumatic. Oropharynx is clear, mucous membranes are moist.  Extraocular muscles are intact. Sclerae anicteric. Skin Warm and dry. No bruising and no rash noted. No pallor. +L first toe with increased erythema. Respiratory Lungs are clear to auscultation bilaterally without wheezes, rales or rhonchi, normal air exchange without accessory muscle use. CVS Normal rate, regular rhythm and normal S1 and S2. No murmurs, gallops, or rubs. Abdomen Soft, nontender and nondistended, normoactive bowel sounds. No palpable mass. No hepatosplenomegaly. Neuro Grossly nonfocal with no obvious sensory or motor deficits. MSK Normal range of motion in general.  No edema and no tenderness. Psych Appropriate mood and affect. Labs:      Recent Labs     01/12/20  0219 01/11/20  0248 01/10/20  0317   WBC 0.0* 0.0* 0.0*   RBC 2.51* 2.18* 2.23*   HGB 8.2* 7.0* 7.4*   HCT 23.9* 21.4* 22.0*   MCV 95.2 98.2* 98.7*   MCH 32.7 32.1 33.2*   MCHC 34.3 32.7 33.6   RDW 17.7* 18.6* 18.7*   PLT 15* 32* 9*   GRANS  --   --  50   LYMPH  --   --  50*   MONOS  --   --  0*   EOS  --   --  0*   BASOS  --   --  0   IG  --   --  0   DF MANUAL MANUAL AUTOMATED   ANEU  --   --  0.0*   ABL  --   --  0.0*   ABM  --   --  0.0*   ALEKSANDR  --   --  0.0   ABB  --   --  0.0   AIG  --   --  0.0        Recent Labs     01/12/20  0219 01/11/20  0248 01/10/20  0317    140 141   K 4.1 4.1 4.2    107 107   CO2 28 27 29   AGAP 6* 6* 5*   * 100 98   BUN 20 22 19   CREA 0.73 0.71 0.67   GFRAA >60 >60 >60   GFRNA >60 >60 >60   CA 9.5 9.4 9.1   SGOT 10* 12* 10*   AP 90 90 89   TP 6.3 6.1* 5.9*   ALB 3.2 3.2 3.0*   GLOB 3.1 2.9 2.9   AGRAT 1.0* 1.1* 1.0*   MG 2.1 2.1 1.9         Imaging: n/a    Medications:  Current Facility-Administered Medications   Medication Dose Route Frequency    0.9% sodium chloride infusion 250 mL  250 mL IntraVENous PRN    levoFLOXacin (LEVAQUIN) tablet 500 mg  500 mg Oral Q24H    diphenhydrAMINE (BENADRYL) capsule 25 mg  25 mg Oral Q6H PRN    acetaminophen (TYLENOL) tablet 650 mg  650 mg Oral Q6H PRN    central line flush (saline) syringe 10 mL  10 mL InterCATHeter PRN    heparin (porcine) pf 300 Units  300 Units InterCATHeter PRN    alum-mag hydroxide-simeth (MYLANTA) oral suspension 30 mL  30 mL Oral Q4H PRN    zolpidem (AMBIEN) tablet 10 mg  10 mg Oral QHS PRN    polyethylene glycol (MIRALAX) packet 17 g  17 g Oral DAILY    LORazepam (ATIVAN) injection 0.5 mg  0.5 mg IntraVENous Q6H PRN    tbo-filgrastim (GRANIX) injection 480 mcg  480 mcg SubCUTAneous QHS    vit C,C-Ok-kusyc-lutein-zeaxan (PRESERVISION AREDS-2) capsule 1 Cap (Patient Supplied)  1 Cap Oral DAILY    acetaminophen/diphenhydrAMINE (TYLENOL PM EXT STR) 500/25 mg   Oral QHS    acyclovir (ZOVIRAX) tablet 400 mg  400 mg Oral BID    allopurinol (ZYLOPRIM) tablet 300 mg  300 mg Oral DAILY    cholecalciferol (VITAMIN D3) (400 Units /10 mcg) tablet 2 Tab  800 Units Oral DAILY    DULoxetine (CYMBALTA) capsule 30 mg  30 mg Oral DAILY    lidocaine-prilocaine (EMLA) 2.5-2.5 % cream   Topical PRN    LORazepam (ATIVAN) tablet 1 mg  1 mg Oral QHS    potassium chloride (K-DUR, KLOR-CON) SR tablet 20 mEq  20 mEq Oral BID    pravastatin (PRAVACHOL) tablet 10 mg  10 mg Oral QHS    voriconazole (VFEND) tablet 200 mg  200 mg Oral Q12H    ondansetron (ZOFRAN) injection 4 mg  4 mg IntraVENous Q4H PRN    prochlorperazine (COMPAZINE) with saline injection 5 mg  5 mg IntraVENous Q6H PRN    HYDROcodone-acetaminophen (NORCO) 5-325 mg per tablet 1 Tab  1 Tab Oral Q6H PRN    morphine injection 2 mg  2 mg IntraVENous Q4H PRN    0.9% sodium chloride infusion  75 mL/hr IntraVENous CONTINUOUS         ASSESSMENT:    Problem List  Date Reviewed: 12/19/2019          Codes Class Noted    Febrile neutropenia ([facility] ICD-10-CM: D70.9, R50.81  ICD-9-CM: 288.00, 780.61  9/21/2019        Pancytopenia due to antineoplastic chemotherapy [facility]) ICD-10-CM: N97.270, T45.1X5A  ICD-9-CM: 284.11, E933.1  6/12/2019        Cellulitis of neck ICD-10-CM: V44.329  ICD-9-CM: 682.1  6/12/2019        Immunocompromised status associated with infection ICD-10-CM: B99.9  ICD-9-CM: 136.9  6/12/2019        Port or reservoir infection ICD-10-CM: O28.092Q  ICD-9-CM: 999.33  6/12/2019        Acute myeloid leukemia not having achieved remission ([facility] ICD-10-CM: C92.00  ICD-9-CM: 205.00  5/9/2019        Admission for antineoplastic chemotherapy ICD-10-CM: Z51.11  ICD-9-CM: V58.11  5/5/2019        AML (acute myeloblastic leukemia) ([facility] ICD-10-CM: C92.00  ICD-9-CM: 205.00  4/28/2019        Weakness generalized ICD-10-CM: R53.1  ICD-9-CM: 780.79  4/28/2019        Pancytopenia ([facility] ICD-10-CM: V70.046  ICD-9-CM: 284.19 4/28/2019        Thrombocytopenia ([facility] ICD-10-CM: D69.6  ICD-9-CM: 287.5  4/27/2019            Ms. Jojo Casillas is a 76 y.o. female admitted on 12/30/2019 with a primary diagnosis of There were no encounter diagnoses. .       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 Subsequently on Decitabine plus Gilteritinib x 3 cycles w/ persistent disease. S/p FLAG-VENITA 11/19 w persistent disease though improvement in blast percentage as well as cytopenias. FLAG-VENITA course was complicated by neutropenic fever and E fecalis/alpha strep bacteremia requiring abx course,  ID transitioned to oral Augmentin at discharge which she completed, port was retained. Counts have clearly improved with improvement in [address] and thrombocytopenia however BM biopsy with persistent disease open (cellularity described at 30% with 10-20% residual blasts on IHC). Results shared with patient, various options discussed moving forward. For now she will try to enjoy holidays/[place] with her family and return next week for likely reinduction. Will consider intermediate dose rivera-C based regimen. Seen today 12/30/2019: Enjoyed Temitope at home. Some fatigue persists. Appetite slowly improving. Discussed various options again, counseled clearly improved: Various options discussed including intermediate dose rivera-C versus reinduction with modified FLAG (will hold off on Tiana Ba given past anthracycline exposure), patient has opted for reinduction with FL AG which is reasonable. Denies any recent fevers or chills, okay to admit for reinduction. Continue prophylactic antibiotics. Will repeat BM biopsy on count recovery. She will stay in house until counts recover. PLAN:  AML  - admit for re-induction w modified dose FLAG  12/31 Day 1 FLAG. 1/6 Day 7 FLAG. Tolerating treatment well. Awaiting count recovery. Start Granix. 1/11 Day 12. Awaiting count recovery. On Granix.     Ingrown toenail / Cellulitis  12/31 Was going to defer treatment pending podiatrist consult, but no podiatrist will come see pt while IP. Gave pt the option of being discharged to see podiatrist prior to proceeding with treatment vs starting treatment along with Vanc. Pt prefers to start treatment. Vanc ordered. 1/1 on Vanc. Toe covered with bandaid today when seen. 1/2  Toe appears much improved. Con't Vanc. 1/3 Infx appears resolved. Will continue Vanc through weekend and will likely DC early next week. 1/4 Infx resolved. Plan to DC Vanc after 7 days of treatment. 1/6 Con't Vanc (D6).   1/7 Resolved. Completed Vanc. 1/11 L first toe appears more erythematous today. Pt reports bumping it several times on side table yesterday. Started using neosporin yesterday. Will add prophylactic Levaquin. Bradycardia  1/4 Asymptomatic. EKG with sinus irish. Pancytopenia secondary to chemotherapy  - Transfuse prn per [facility] SOPs  1/11 Transfuse 1 unit PRBCs. Continue home meds  Prophylactic Antibx: Acyclovir, Voriconazole, Levaquin  [facility] SOPs  SCDs for DVT prophylaxis (AC contraindicated d/t thrombocytopenia)    Disposition:  Discharge pending count recovery. On Granix. Will need repeat BMbx on count recovery. RTC within week of discharge. Goals and plan of care reviewed with the patient.   All questions answered to the best of our ability.           Niels Calderón NP    [address]  Office : [phone]  Fax : [phone]  I personally saw, exammed and counselled the patient, and discussed with NP, agree with above history/assessment/plan. 73 y.o. female AML with FLT3 mut s/p multiple lines of chemo and Gilteritinib but refractory, received salvage FLAG day 1, on granix, toe nail cellulitis responded to iv vanc and stop, kicked cart twice and toe swollen again, added levaquine back, still swollen, continue above care, transfuse as needed, counts Nga Dia M.D.   [address]  Office : [phone]  Fax : [phone]

## 2020-01-12 NOTE — PROGRESS NOTES
END OF SHIFT NOTE:    Intake/Output  01/11 1901 - 01/12 0700  In: 240 [P.O.:240]  Out: 1900 [Urine:1900]   Voiding: YES  Catheter: NO  Drain:              Stool:  0 occurrences. Stool Assessment  Stool Color: Abigail Nice (01/08/20 1828)  Stool Appearance: Soft(per pt) (01/10/20 0732)  Stool Amount: Small (01/08/20 1828)  Stool Source/Status: Rectum (01/08/20 1828)    Emesis:  0 occurrences. VITAL SIGNS  Patient Vitals for the past 12 hrs:   Temp Pulse Resp BP SpO2   01/12/20 0312 98.5 °F (36.9 °C) 94 16 145/77 97 %   01/11/20 2319 99.1 °F (37.3 °C) (!) 115 18 122/56 98 %   01/11/20 1923 98.7 °F (37.1 °C) (!) 104 18 108/64 100 %       Pain Assessment  Pain 1  Pain Scale 1: Numeric (0 - 10) (01/12/20 0312)  Pain Intensity 1: 0 (01/12/20 0312)  Patient Stated Pain Goal: 0 (01/11/20 1330)  Pain Reassessment 1: Patient resting w/respiratory rate greater than 10 (01/09/20 0209)    Ambulating  Yes    Additional Information: Left toe is getting worse; angry red and swollen. Patient complains of how sore it is. Patient started on Levoquin. Platelets dropped from 32 to 15. Otherwise, uneventful night. VSS. Patient still resting in bed. Shift report given to oncoming nurse at the bedside.     Libby Nuno

## 2020-01-12 NOTE — PROGRESS NOTES
END OF SHIFT NOTE:    Intake/Output  No intake/output data recorded. Voiding: YES  Catheter: NO  Drain:              Stool:  0 occurrences. Stool Assessment  Stool Color: Desma Pickle (01/08/20 1828)  Stool Appearance: Soft(per pt) (01/10/20 0732)  Stool Amount: Small (01/08/20 1828)  Stool Source/Status: Rectum (01/08/20 1828)    Emesis:  0 occurrences. VITAL SIGNS  Patient Vitals for the past 12 hrs:   Temp Pulse Resp BP SpO2   01/11/20 1519 98.5 °F (36.9 °C) 90 16 121/58 98 %   01/11/20 1219 98.2 °F (36.8 °C) 99 17 122/55 96 %   01/11/20 1125 98.1 °F (36.7 °C) 99 17 140/53 100 %   01/11/20 1037 98.1 °F (36.7 °C) 99 16 112/57 98 %   01/11/20 0812 97.9 °F (36.6 °C) (!) 102 18 107/66 100 %       Pain Assessment  Pain 1  Pain Scale 1: Numeric (0 - 10) (01/11/20 1330)  Pain Intensity 1: 0 (01/11/20 1330)  Patient Stated Pain Goal: 0 (01/11/20 1330)  Pain Reassessment 1: Patient resting w/respiratory rate greater than 10 (01/09/20 0209)    Ambulating  Yes    Additional Information: Port needle and dressing changed today. 1 unit of RBCs transfused pt stated she \"feels better\"     Shift report given to oncoming nurse at the bedside.     Ruperto Savaedra

## 2020-01-12 NOTE — PROGRESS NOTES
Problem: Falls - Risk of  Goal: *Absence of Falls  Description  Document Philly Grigsby Fall Risk and appropriate interventions in the flowsheet.   Outcome: Progressing Towards Goal  Note: Fall Risk Interventions:  Mobility Interventions: Strengthening exercises (ROM-active/passive)    Mentation Interventions: Adequate sleep, hydration, pain control, Increase mobility, Room close to nurse's station, Toileting rounds, Update white board    Medication Interventions: Patient to call before getting OOB    Elimination Interventions: Call light in reach, Elevated toilet seat, Patient to call for help with toileting needs, Toilet paper/wipes in reach, Toileting schedule/hourly rounds    History of Falls Interventions: Room close to nurse's station

## 2020-01-13 NOTE — PROGRESS NOTES
[facility] Hematology & Oncology        Inpatient Hematology / Oncology Progress Note      Admission Date: 2019  5:52 PM  Reason for Admission/Hospital Course: Admission for antineoplastic chemotherapy [Z51.11]      24 Hour Events:  Afebrile  Day 14 FLAG  L first toe appears erythematous about the same-adding vanc back  Awaiting count recovery, on Granix   at bedside    ROS:  Constitutional: Negative for fever, chills, weakness, malaise, fatigue. CV: Negative for chest pain, palpitations, edema. Respiratory: Negative for dyspnea, cough, wheezing. GI: Negative for nausea, abdominal pain, diarrhea. 10 point review of systems is otherwise negative with the exception of the elements mentioned above in the HPI. No Known Allergies    OBJECTIVE:  Patient Vitals for the past 8 hrs:   BP Temp Pulse Resp SpO2   20 0754 119/71 97.8 °F (36.6 °C) (!) 105 17 98 %   20 0430 138/74 98.5 °F (36.9 °C) (!) 101 17 94 %     Temp (24hrs), Av.6 °F (37 °C), Min:97.8 °F (36.6 °C), Max:99.5 °F (37.5 °C)    No intake/output data recorded. Physical Exam:  Constitutional: Well developed, well nourished female in no acute distress, sitting comfortably on the bed   HEENT: Normocephalic and atraumatic. Oropharynx is clear, mucous membranes are moist.  Extraocular muscles are intact. Sclerae anicteric. Skin Warm and dry. No bruising and no rash noted. No pallor. +L first toe with increased erythema. Respiratory Lungs are clear to auscultation bilaterally without wheezes, rales or rhonchi, normal air exchange without accessory muscle use. CVS Normal rate, regular rhythm and normal S1 and S2. No murmurs, gallops, or rubs. Abdomen Soft, nontender and nondistended, normoactive bowel sounds. No palpable mass. No hepatosplenomegaly. Neuro Grossly nonfocal with no obvious sensory or motor deficits. MSK Normal range of motion in general.  No edema and no tenderness.    Psych Appropriate mood and affect.         Labs:      Recent Labs     01/13/20  0421 01/12/20 0219 01/11/20  0248   WBC 0.1* 0.0* 0.0*   RBC 2.54* 2.51* 2.18*   HGB 8.2* 8.2* 7.0*   HCT 24.3* 23.9* 21.4*   MCV 95.7 95.2 98.2*   MCH 32.3 32.7 32.1   MCHC 33.7 34.3 32.7   RDW 17.5* 17.7* 18.6*   PLT 7* 15* 32*   DF MANUAL MANUAL MANUAL        Recent Labs     01/13/20  0421 01/12/20 0219 01/11/20  0248    140 140   K 4.1 4.1 4.1   * 106 107   CO2 28 28 27   AGAP 5* 6* 6*   GLU 96 101* 100   BUN 18 20 22   CREA 0.76 0.73 0.71   GFRAA >60 >60 >60   GFRNA >60 >60 >60   CA 9.6 9.5 9.4   SGOT 10* 10* 12*   AP 93 90 90   TP 6.4 6.3 6.1*   ALB 3.3 3.2 3.2   GLOB 3.1 3.1 2.9   AGRAT 1.1* 1.0* 1.1*   MG 2.0 2.1 2.1         Imaging: n/a    Medications:  Current Facility-Administered Medications   Medication Dose Route Frequency    0.9% sodium chloride infusion 250 mL  250 mL IntraVENous PRN    levoFLOXacin (LEVAQUIN) tablet 500 mg  500 mg Oral Q24H    diphenhydrAMINE (BENADRYL) capsule 25 mg  25 mg Oral Q6H PRN    acetaminophen (TYLENOL) tablet 650 mg  650 mg Oral Q6H PRN    central line flush (saline) syringe 10 mL  10 mL InterCATHeter PRN    heparin (porcine) pf 300 Units  300 Units InterCATHeter PRN    alum-mag hydroxide-simeth (MYLANTA) oral suspension 30 mL  30 mL Oral Q4H PRN    zolpidem (AMBIEN) tablet 10 mg  10 mg Oral QHS PRN    polyethylene glycol (MIRALAX) packet 17 g  17 g Oral DAILY    LORazepam (ATIVAN) injection 0.5 mg  0.5 mg IntraVENous Q6H PRN    tbo-filgrastim (GRANIX) injection 480 mcg  480 mcg SubCUTAneous QHS    vit C,F-Fx-robeb-lutein-zeaxan (PRESERVISION AREDS-2) capsule 1 Cap (Patient Supplied)  1 Cap Oral DAILY    acyclovir (ZOVIRAX) tablet 400 mg  400 mg Oral BID    allopurinol (ZYLOPRIM) tablet 300 mg  300 mg Oral DAILY    cholecalciferol (VITAMIN D3) (400 Units /10 mcg) tablet 2 Tab  800 Units Oral DAILY    DULoxetine (CYMBALTA) capsule 30 mg  30 mg Oral DAILY    lidocaine-prilocaine (EMLA) 2.5-2.5 % cream   Topical PRN    LORazepam (ATIVAN) tablet 1 mg  1 mg Oral QHS    potassium chloride (K-DUR, KLOR-CON) SR tablet 20 mEq  20 mEq Oral BID    pravastatin (PRAVACHOL) tablet 10 mg  10 mg Oral QHS    voriconazole (VFEND) tablet 200 mg  200 mg Oral Q12H    ondansetron (ZOFRAN) injection 4 mg  4 mg IntraVENous Q4H PRN    prochlorperazine (COMPAZINE) with saline injection 5 mg  5 mg IntraVENous Q6H PRN    HYDROcodone-acetaminophen (NORCO) 5-325 mg per tablet 1 Tab  1 Tab Oral Q6H PRN    morphine injection 2 mg  2 mg IntraVENous Q4H PRN         ASSESSMENT:    Problem List  Date Reviewed: 12/19/2019          Codes Class Noted    Febrile neutropenia ([facility] ICD-10-CM: D70.9, R50.81  ICD-9-CM: 288.00, 780.61  9/21/2019        Pancytopenia due to antineoplastic chemotherapy [facility]) ICD-10-CM: L77.834, T45.1X5A  ICD-9-CM: 284.11, E933.1  6/12/2019        Cellulitis of neck ICD-10-CM: C90.777  ICD-9-CM: 682.1  6/12/2019        Immunocompromised status associated with infection ICD-10-CM: B99.9  ICD-9-CM: 136.9  6/12/2019        Port or reservoir infection ICD-10-CM: F28.354P  ICD-9-CM: 999.33  6/12/2019        Acute myeloid leukemia not having achieved remission ([facility] ICD-10-CM: C92.00  ICD-9-CM: 205.00  5/9/2019        Admission for antineoplastic chemotherapy ICD-10-CM: Z51.11  ICD-9-CM: V58.11  5/5/2019        AML (acute myeloblastic leukemia) ([facility] ICD-10-CM: C92.00  ICD-9-CM: 205.00  4/28/2019        Weakness generalized ICD-10-CM: R53.1  ICD-9-CM: 780.79  4/28/2019        Pancytopenia ([facility] ICD-10-CM: C95.743  ICD-9-CM: 284.19  4/28/2019        Thrombocytopenia ([facility] ICD-10-CM: D69.6  ICD-9-CM: 287.5  4/27/2019            Ms. Gio Torres is a 76 y.o. female admitted on 12/30/2019 with a primary diagnosis of There were no encounter diagnoses. .       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  Subsequently on Decitabine plus Gilteritinib x 3 cycles w/ persistent disease. S/p FLAG-VENITA 11/19 w persistent disease though improvement in blast percentage as well as cytopenias. FLAG-VENITA course was complicated by neutropenic fever and E fecalis/alpha strep bacteremia requiring abx course,  ID transitioned to oral Augmentin at discharge which she completed, port was retained. Counts have clearly improved with improvement in [address] and thrombocytopenia however BM biopsy with persistent disease open (cellularity described at 30% with 10-20% residual blasts on IHC). Results shared with patient, various options discussed moving forward. For now she will try to enjoy holidays/[place] with her family and return next week for likely reinduction. Will consider intermediate dose rivera-C based regimen. Seen today 12/30/2019: Enjoyed [place] at home. Some fatigue persists. Appetite slowly improving. Discussed various options again, counseled clearly improved: Various options discussed including intermediate dose rivera-C versus reinduction with modified FLAG (will hold off on Tiana Ba given past anthracycline exposure), patient has opted for reinduction with FL AG which is reasonable. Denies any recent fevers or chills, okay to admit for reinduction. Continue prophylactic antibiotics. Will repeat BM biopsy on count recovery. She will stay in house until counts recover. PLAN:  AML  - admit for re-induction w modified dose FLAG  12/31 Day 1 FLAG. 1/6 Day 7 FLAG. Tolerating treatment well. Awaiting count recovery. Start Granix. 1/11 Day 12. Awaiting count recovery. On Granix. 1/13 Day 14 FLAG. Continues granix. WBC 0.1    Ingrown toenail / Cellulitis  12/31 Was going to defer treatment pending podiatrist consult, but no podiatrist will come see pt while IP. Gave pt the option of being discharged to see podiatrist prior to proceeding with treatment vs starting treatment along with Vanc. Pt prefers to start treatment. Vanc ordered. 1/1 on Vanc.   Toe covered with bandaid today when seen. 1/2  Toe appears much improved. Con't Vanc. 1/3 Infx appears resolved. Will continue Vanc through weekend and will likely DC early next week. 1/4 Infx resolved. Plan to DC Vanc after 7 days of treatment. 1/6 Con't Vanc (D6).   1/7 Resolved. Completed Vanc. 1/11 L first toe appears more erythematous today. Pt reports bumping it several times on side table yesterday. Started using neosporin yesterday. Will add prophylactic Levaquin. 1/13 adding vanc back x 7 days    Bradycardia  1/4 Asymptomatic. EKG with sinus irish. Pancytopenia secondary to chemotherapy  - Transfuse prn per [facility] SOPs  1/11 Transfuse 1 unit PRBCs. Continue home meds  Prophylactic Antibx: Acyclovir, Voriconazole, Levaquin  [facility] SOPs  SCDs for DVT prophylaxis (AC contraindicated d/t thrombocytopenia)    Disposition:  Discharge pending count recovery. On Granix. Will need repeat BMbx on count recovery. RTC within week of discharge. Goals and plan of care reviewed with the patient. All questions answered to the best of our ability.           Rona Alvarado NP    [address]  Office : [phone]  Fax : [phone]      Attending Addendum:  I have personally performed a face to face diagnostic evaluation on this patient. I have reviewed and agree with the care plan as documented above by Rona Alvarado N.P.  My findings are as follows: Patient appears lethargic, heart rate regular without murmurs, abdomen is non-tender, bowel sounds are positive. 76 female h/o port infection, AML, FLT3 ITD +ve with NPM1 and TET2 mutation, failed induction with 7+3 and Midostaurin. Subsequently on Decitabine plus Gilteritinib x 3 cycles w/ persistent disease. S/p FLAG-VENITA 11/19 w persistent disease though improvement in blast percentage as well as cytopenias. He is now admitted for reinduction with dose modified FLAG D+14.   Left toe erythema persists, will restart IV vancomycin. Monitor closely for fevers or worsening signs of infection. Awaiting count recovery, remains on Granix. Transfuse blood products as needed. Continue prophylactic antibiotics including acyclovir and voriconazole. P.o.  intake and ambulation encouraged.           Total time 35 min 50% in direct consultation about the patient's diagnosis and management          Lucien Cisneros MD  [facility] Hematology/Oncology  [address] [place] [address]  Office : [phone]  Fax : [phone]

## 2020-01-13 NOTE — PROGRESS NOTES
Pharmacokinetic Consult to Pharmacist    Tyrone Lisa is a 76 y.o. female being treated for L toe infection with vancomycin. Weight: 80.2 kg (176 lb 14.4 oz)  Lab Results   Component Value Date/Time    BUN 18 01/13/2020 04:21 AM    Creatinine 0.76 01/13/2020 04:21 AM    WBC 0.1 (LL) 01/13/2020 04:21 AM    Procalcitonin 0.1 09/21/2019 06:50 PM    Lactic Acid (POC) 0.67 09/21/2019 08:51 PM      Estimated Creatinine Clearance: 68.7 mL/min (based on SCr of 0.76 mg/dL). Day 1 of vancomycin restart (completed 7 days of vanc on 1/6/20). Goal trough is 10-20. Vancomycin dose resumed at 1000 mg q12h. Will continue to follow patient.       Thank you,  Perry Hardin, PharmD, [address]  Clinical Staff Pharmacist  [phone]

## 2020-01-13 NOTE — PROGRESS NOTES
1/12/2020 1845 Seen ambulating I halls. Tolerating well    2210 Discontinued IVF's and Tylenol PM from [facility]. IVF's have not been administered since 1/1 and provider is aware pt has not been receiving IVF's. Pt states she has not taken Tylenol PM in about a week and has been taking Ambien 10mg PO prn instead. 0500 Critical CBc readback/addressed per SOP's.    1/13/2020 0630 Stable throughout shift. No complaints or requests. Continue with POC. Await count recovery. Report to oncoming RN.

## 2020-01-13 NOTE — PROGRESS NOTES
Nutrition  Reason for assessment: Follow Up  Assessment:   Diet: DIET REGULAR  DIET NUTRITIONAL SUPPLEMENTS All Meals; Ensure Enlive ( )    Food/Nutrition Patient History:    Pt is 77 yo female who presented for antineoplastic chemo. She has AML. Pt just finished Day 14 FLAG. Pt was awake in her room with  also present. Pt reports that she is eating great and today is her favorite lunch of BBQ pork. She states that she no longer has nausea. She also requested to discontinue nutrition supplements at this time. Meds: Zofran PRN, Granix     Anthropometrics:   Height: 1.664m, Weight: 80.2 kg (176 lb 14.4 oz), Weight Source: Standing scale (comment), Body mass index is 28.99 kg/m². BMI class of overweight. Macronutrient needs: 80.4kg (current)  EER: 0752-0469 kcal/day (20-22 kcals/kg)  EPR: 80-96 g/day (1-1.2 g/kg)    Intake/Comparative Standards: Per documentation, patient consuming average 99% of 10 recorded meals in 5 days. This meets % of kcal needs and % of protein needs. Patient Vitals for the past 100 hrs:   % Diet Eaten   01/13/20 0957 100 %   01/12/20 1803 100 %   01/12/20 1220 100 %   01/12/20 0802 100 %   01/11/20 0812 90 %   01/10/20 1258 100 %   01/10/20 0852 100 %   01/09/20 1753 100 %   01/09/20 1326 99 %   01/09/20 0930 100 %       Nutrition Intervention:  Meals and Snacks: Continue with current diet.   Commercial Beverages and Supplements: Discontinue Ensure [employer]  Discharge Nutrition Plan: Too soon to determine    Aster Ramirez [address], [address] [place] [address],    [phone]

## 2020-01-13 NOTE — PROGRESS NOTES
END OF SHIFT NOTE:    Intake/Output  No intake/output data recorded. Voiding: YES  Catheter: NO  Drain:              Stool:  1 occurrences. Stool Assessment  Stool Color: Karma Naik (01/12/20 1805)  Stool Appearance: Hard (01/12/20 1805)  Stool Amount: Small (01/12/20 1805)  Stool Source/Status: Rectum (01/12/20 1805)    Emesis:  0 occurrences. VITAL SIGNS  Patient Vitals for the past 12 hrs:   Temp Pulse Resp BP SpO2   01/12/20 1425 99 °F (37.2 °C) (!) 103 16 132/73 100 %   01/12/20 1012 98.2 °F (36.8 °C) 99 18 119/65 99 %   01/12/20 0753  99 16 140/86 98 %       Pain Assessment  Pain 1  Pain Scale 1: Numeric (0 - 10) (01/12/20 1430)  Pain Intensity 1: 0 (01/12/20 1430)  Patient Stated Pain Goal: 0 (01/12/20 1430)  Pain Reassessment 1: Patient resting w/respiratory rate greater than 10 (01/09/20 0209)    Ambulating  Yes    Additional Information:   -Pt had a good day today, was able to go outside with  and enjoy the weather  -L great toe slightly improved today and pt states less painful.   -small hard BM today, instructed to take miralax tomorrow     Shift report given to oncoming nurse at the bedside.     Edie Bailey RN

## 2020-01-14 NOTE — PROGRESS NOTES
0340 Critical CBC readback/addressed per SOP's. Low grade temp 100.2 resolved without Tylenol. EOS: Stable. Continue with POC. Await count recovery. Report to oncoming RN.

## 2020-01-14 NOTE — PROGRESS NOTES
[facility] Hematology & Oncology        Inpatient Hematology / Oncology Progress Note      Admission Date: 2019  5:52 PM  Reason for Admission/Hospital Course: Admission for antineoplastic chemotherapy [Z51.11]      24 Hour Events:  Tmax 100.2  Day 15 FLAG  L first toe appears erythematous about the same-added vanc back  Awaiting count recovery, on Granix  WBC 0.2   at bedside    ROS:  Constitutional: Negative for fever, chills, weakness, malaise, fatigue. CV: Negative for chest pain, palpitations, edema. Respiratory: Negative for dyspnea, cough, wheezing. GI: Negative for nausea, abdominal pain, diarrhea. 10 point review of systems is otherwise negative with the exception of the elements mentioned above in the HPI. No Known Allergies    OBJECTIVE:  Patient Vitals for the past 8 hrs:   BP Temp Pulse Resp SpO2   20 0749 104/63 98 °F (36.7 °C) 95 18 96 %   20 0310 126/77 98.5 °F (36.9 °C) 87 18 96 %     Temp (24hrs), Av.7 °F (37.1 °C), Min:97.9 °F (36.6 °C), Max:100.2 °F (37.9 °C)     0701 -  1900  In: 0 [P.O.:358]  Out: -     Physical Exam:  Constitutional: Well developed, well nourished female in no acute distress, sitting comfortably on the bed   HEENT: Normocephalic and atraumatic. Oropharynx is clear, mucous membranes are moist.  Extraocular muscles are intact. Sclerae anicteric. Skin Warm and dry. No bruising and no rash noted. No pallor. +L first toe with increased erythema. Respiratory Lungs are clear to auscultation bilaterally without wheezes, rales or rhonchi, normal air exchange without accessory muscle use. CVS Normal rate, regular rhythm and normal S1 and S2. No murmurs, gallops, or rubs. Abdomen Soft, nontender and nondistended, normoactive bowel sounds. No palpable mass. No hepatosplenomegaly. Neuro Grossly nonfocal with no obvious sensory or motor deficits. MSK Normal range of motion in general.  No edema and no tenderness. Psych Appropriate mood and affect.         Labs:      Recent Labs     01/14/20 0221 01/13/20 0421 01/12/20 0219   WBC 0.2* 0.1* 0.0*   RBC 2.31* 2.54* 2.51*   HGB 7.3* 8.2* 8.2*   HCT 22.1* 24.3* 23.9*   MCV 95.7 95.7 95.2   MCH 31.6 32.3 32.7   MCHC 33.0 33.7 34.3   RDW 17.3* 17.5* 17.7*   PLT 26* 7* 15*   DF MANUAL MANUAL MANUAL        Recent Labs     01/14/20 0221 01/13/20 0421 01/12/20 0219    141 140   K 4.1 4.1 4.1    108* 106   CO2 28 28 28   AGAP 6* 5* 6*   GLU 96 96 101*   BUN 19 18 20   CREA 0.79 0.76 0.73   GFRAA >60 >60 >60   GFRNA >60 >60 >60   CA 9.3 9.6 9.5   SGOT 12* 10* 10*   AP 89 93 90   TP 6.1* 6.4 6.3   ALB 3.1* 3.3 3.2   GLOB 3.0 3.1 3.1   AGRAT 1.0* 1.1* 1.0*   MG 1.8 2.0 2.1         Imaging: n/a    Medications:  Current Facility-Administered Medications   Medication Dose Route Frequency    senna-docusate (PERICOLACE) 8.6-50 mg per tablet 1 Tab  1 Tab Oral DAILY    vancomycin (VANCOCIN) 1,000 mg in 0.9% sodium chloride (MBP/ADV) 250 mL  1,000 mg IntraVENous Q12H    0.9% sodium chloride infusion 250 mL  250 mL IntraVENous PRN    levoFLOXacin (LEVAQUIN) tablet 500 mg  500 mg Oral Q24H    diphenhydrAMINE (BENADRYL) capsule 25 mg  25 mg Oral Q6H PRN    acetaminophen (TYLENOL) tablet 650 mg  650 mg Oral Q6H PRN    central line flush (saline) syringe 10 mL  10 mL InterCATHeter PRN    heparin (porcine) pf 300 Units  300 Units InterCATHeter PRN    alum-mag hydroxide-simeth (MYLANTA) oral suspension 30 mL  30 mL Oral Q4H PRN    zolpidem (AMBIEN) tablet 10 mg  10 mg Oral QHS PRN    polyethylene glycol (MIRALAX) packet 17 g  17 g Oral DAILY    LORazepam (ATIVAN) injection 0.5 mg  0.5 mg IntraVENous Q6H PRN    tbo-filgrastim (GRANIX) injection 480 mcg  480 mcg SubCUTAneous QHS    vit C,L-Fn-zwfna-lutein-zeaxan (PRESERVISION AREDS-2) capsule 1 Cap (Patient Supplied)  1 Cap Oral DAILY    acyclovir (ZOVIRAX) tablet 400 mg  400 mg Oral BID    allopurinol (ZYLOPRIM) tablet 300 mg  300 mg Oral DAILY    cholecalciferol (VITAMIN D3) (400 Units /10 mcg) tablet 2 Tab  800 Units Oral DAILY    DULoxetine (CYMBALTA) capsule 30 mg  30 mg Oral DAILY    lidocaine-prilocaine (EMLA) 2.5-2.5 % cream   Topical PRN    LORazepam (ATIVAN) tablet 1 mg  1 mg Oral QHS    potassium chloride (K-DUR, KLOR-CON) SR tablet 20 mEq  20 mEq Oral BID    pravastatin (PRAVACHOL) tablet 10 mg  10 mg Oral QHS    voriconazole (VFEND) tablet 200 mg  200 mg Oral Q12H    ondansetron (ZOFRAN) injection 4 mg  4 mg IntraVENous Q4H PRN    prochlorperazine (COMPAZINE) with saline injection 5 mg  5 mg IntraVENous Q6H PRN    HYDROcodone-acetaminophen (NORCO) 5-325 mg per tablet 1 Tab  1 Tab Oral Q6H PRN    morphine injection 2 mg  2 mg IntraVENous Q4H PRN         ASSESSMENT:    Problem List  Date Reviewed: 12/19/2019          Codes Class Noted    Febrile neutropenia ([facility] ICD-10-CM: D70.9, R50.81  ICD-9-CM: 288.00, 780.61  9/21/2019        Pancytopenia due to antineoplastic chemotherapy [facility]) ICD-10-CM: F14.461, T45.1X5A  ICD-9-CM: 284.11, E933.1  6/12/2019        Cellulitis of neck ICD-10-CM: V55.383  ICD-9-CM: 682.1  6/12/2019        Immunocompromised status associated with infection ICD-10-CM: B99.9  ICD-9-CM: 136.9  6/12/2019        Port or reservoir infection ICD-10-CM: X87.787Y  ICD-9-CM: 999.33  6/12/2019        Acute myeloid leukemia not having achieved remission ([facility] ICD-10-CM: C92.00  ICD-9-CM: 205.00  5/9/2019        Admission for antineoplastic chemotherapy ICD-10-CM: Z51.11  ICD-9-CM: V58.11  5/5/2019        AML (acute myeloblastic leukemia) ([facility] ICD-10-CM: C92.00  ICD-9-CM: 205.00  4/28/2019        Weakness generalized ICD-10-CM: R53.1  ICD-9-CM: 780.79  4/28/2019        Pancytopenia ([address] ICD-10-CM: X76.661  ICD-9-CM: 284.19  4/28/2019        Thrombocytopenia ([facility]) ICD-10-CM: D69.6  ICD-9-CM: 287.5  4/27/2019            Ms. Allyssa Howe is a 76 y.o. female admitted on 12/30/2019 with a primary diagnosis of There were no encounter diagnoses. .       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 Subsequently on Decitabine plus Gilteritinib x 3 cycles w/ persistent disease. S/p FLAG-VENITA 11/19 w persistent disease though improvement in blast percentage as well as cytopenias. FLAG-VENITA course was complicated by neutropenic fever and E fecalis/alpha strep bacteremia requiring abx course,  ID transitioned to oral Augmentin at discharge which she completed, port was retained. Counts have clearly improved with improvement in [address] and thrombocytopenia however BM biopsy with persistent disease open (cellularity described at 30% with 10-20% residual blasts on IHC). Results shared with patient, various options discussed moving forward. For now she will try to enjoy holidays/Temitope with her family and return next week for likely reinduction. Will consider intermediate dose rivera-C based regimen. Seen today 12/30/2019: Enjoyed Temitope at home. Some fatigue persists. Appetite slowly improving. Discussed various options again, counseled clearly improved: Various options discussed including intermediate dose rivera-C versus reinduction with modified FLAG (will hold off on Sandralee Geri given past anthracycline exposure), patient has opted for reinduction with FL AG which is reasonable. Denies any recent fevers or chills, okay to admit for reinduction. Continue prophylactic antibiotics. Will repeat BM biopsy on count recovery. She will stay in house until counts recover. PLAN:  AML  - admit for re-induction w modified dose FLAG  12/31 Day 1 FLAG. 1/6 Day 7 FLAG. Tolerating treatment well. Awaiting count recovery. Start Granix. 1/11 Day 12. Awaiting count recovery. On Granix. 1/13 Day 14 FLAG. Continues granix. WBC 0.1    Ingrown toenail / Cellulitis  12/31 Was going to defer treatment pending podiatrist consult, but no podiatrist will come see pt while IP.   Gave pt the option of being discharged to see podiatrist prior to proceeding with treatment vs starting treatment along with Vanc. Pt prefers to start treatment. Vanc ordered. 1/1 on Vanc. Toe covered with bandaid today when seen. 1/2  Toe appears much improved. Con't Vanc. 1/3 Infx appears resolved. Will continue Vanc through weekend and will likely DC early next week. 1/4 Infx resolved. Plan to DC Vanc after 7 days of treatment. 1/6 Con't Vanc (D6).   1/7 Resolved. Completed Vanc. 1/11 L first toe appears more erythematous today. Pt reports bumping it several times on side table yesterday. Started using neosporin yesterday. Will add prophylactic Levaquin. 1/13 adding vanc back x 7 days    Bradycardia  1/4 Asymptomatic. EKG with sinus irish. Pancytopenia secondary to chemotherapy  - Transfuse prn per [facility] SOPs  1/11 Transfuse 1 unit PRBCs. Continue home meds  Prophylactic Antibx: Acyclovir, Voriconazole, Levaquin  [facility] SOPs  SCDs for DVT prophylaxis (AC contraindicated d/t thrombocytopenia)    Disposition:  Discharge pending count recovery. On Granix. Will need repeat BMbx on count recovery. RTC within week of discharge. Goals and plan of care reviewed with the patient. All questions answered to the best of our ability.           Gena Mix NP    [address]  Office : [phone]  Fax : [phone]      Attending Addendum:  I have personally performed a face to face diagnostic evaluation on this patient. I have reviewed and agree with the care plan as documented above by Lilly Melton N.P.  My findings are as follows: Patient appears lethargic, heart rate regular without murmurs, abdomen is non-tender, bowel sounds are positive. 76 female h/o port infection, AML, FLT3 ITD +ve with NPM1 and TET2 mutation, failed induction with 7+3 and Midostaurin. Subsequently on Decitabine plus Gilteritinib x 3 cycles w/ persistent disease. S/p FLAG-VENITA 11/19 w persistent disease though improvement in blast percentage as well as cytopenias. He is now admitted for reinduction with dose modified FLAG D+15. Left toe erythema persists, restarted IV vancomycin 1/13/19: toe about same today. Monitor closely for fevers or worsening signs of infection. Awaiting count recovery, remains on Granix. Transfuse blood products as needed. Continue prophylactic antibiotics including acyclovir and voriconazole. P.o.  intake and ambulation encouraged.           Total time 35 min 50% in direct consultation about the patient's diagnosis and management             Santhosh Guy MD  [facility] Hematology/Oncology  [address] [place] [address]  Office : [phone]  Fax : [phone]

## 2020-01-14 NOTE — PROGRESS NOTES
Problem: Falls - Risk of  Goal: *Absence of Falls  Description  Document Cara Morales Fall Risk and appropriate interventions in the flowsheet.   Outcome: Progressing Towards Goal  Note: Fall Risk Interventions:  Mobility Interventions: Strengthening exercises (ROM-active/passive), Communicate number of staff needed for ambulation/transfer    Mentation Interventions: Adequate sleep, hydration, pain control    Medication Interventions: Patient to call before getting OOB, Teach patient to arise slowly    Elimination Interventions: Call light in reach, Patient to call for help with toileting needs    History of Falls Interventions: Room close to nurse's station         Problem: Patient Education: Go to Patient Education Activity  Goal: Patient/Family Education  Outcome: Progressing Towards Goal     Problem: Chemotherapy, Day 3 to Discharge  Goal: Activity/Safety  Outcome: Progressing Towards Goal  Goal: Consults, if ordered  Outcome: Progressing Towards Goal  Goal: Diagnostic Test/Procedures  Outcome: Progressing Towards Goal  Goal: Nutrition/Diet  Outcome: Progressing Towards Goal  Goal: Discharge Planning  Outcome: Progressing Towards Goal  Goal: Medications  Outcome: Progressing Towards Goal  Goal: Respiratory  Outcome: Progressing Towards Goal  Goal: Treatments/Interventions/Procedures  Outcome: Progressing Towards Goal  Goal: Psychosocial  Outcome: Progressing Towards Goal  Goal: *Optimal pain control at patient's stated goal  Outcome: Progressing Towards Goal  Goal: *Hemodynamically stable  Outcome: Progressing Towards Goal  Goal: *Adequate oxygenation  Outcome: Progressing Towards Goal     Problem: Chemotherapy Discharge Outcomes  Goal: *Verbalizes understanding and describes prescribed diet  Outcome: Progressing Towards Goal  Goal: *Describes follow-up/return visits to physicians  Outcome: Progressing Towards Goal  Goal: *Describes home care/support arrangements established based on need  Outcome: Progressing Towards Goal  Goal: *Describes available resources and support systems  Outcome: Progressing Towards Goal  Goal: *Anxiety reduced or absent  Outcome: Progressing Towards Goal  Goal: *Understands and describes signs and symptoms to report to providers(Stroke Metric)  Outcome: Progressing Towards Goal  Goal: *Hemodynamically stable  Outcome: Progressing Towards Goal  Goal: *Tolerating diet  Outcome: Progressing Towards Goal  Goal: *Verbalizes understanding and describes medication purposes and frequencies  Outcome: Progressing Towards Goal  Goal: *Venous access site with positive blood return and without signs and symptoms of infection  Outcome: Progressing Towards Goal  Goal: *Verbalizes understanding of bowel regimen  Outcome: Progressing Towards Goal     Problem: Nutrition Deficit  Goal: *Optimize nutritional status  Outcome: Progressing Towards Goal     Problem: Anemia Care Plan (Adult and Pediatric)  Goal: *Labs within defined limits  Outcome: Progressing Towards Goal  Goal: *Tolerates increased activity  Outcome: Progressing Towards Goal     Problem: Nausea/Vomiting (Adult)  Goal: *Absence of nausea/vomiting  Outcome: Progressing Towards Goal     Problem: Patient Education: Go to Patient Education Activity  Goal: Patient/Family Education  Outcome: Progressing Towards Goal     Problem: Pain  Goal: *Control of Pain  Outcome: Progressing Towards Goal     Problem: Patient Education: Go to Patient Education Activity  Goal: Patient/Family Education  Outcome: Progressing Towards Goal     Problem: Impaired Skin Integrity/Pressure Injury Treatment  Goal: *Improvement of Existing Pressure Injury  Outcome: Progressing Towards Goal  Goal: *Prevention of pressure injury  Description  Document Ameya Scale and appropriate interventions in the flowsheet.   Outcome: Progressing Towards Goal  Note: Pressure Injury Interventions:  Sensory Interventions: Assess need for specialty bed, Assess changes in LOC, Maintain/enhance activity level, Keep linens dry and wrinkle-free    Moisture Interventions: Apply protective barrier, creams and emollients, Maintain skin hydration (lotion/cream), Assess need for specialty bed    Activity Interventions: Increase time out of bed, Pressure redistribution bed/mattress(bed type)    Mobility Interventions: HOB 30 degrees or less, Pressure redistribution bed/mattress (bed type)    Nutrition Interventions: Document food/fluid/supplement intake    Friction and Shear Interventions: Apply protective barrier, creams and emollients, Lift team/patient mobility team                Problem: Patient Education: Go to Patient Education Activity  Goal: Patient/Family Education  Outcome: Progressing Towards Goal

## 2020-01-15 NOTE — PROGRESS NOTES
0440 Critical CBC readback/addressed per SOP. 
 
EOS: VSS throughout shift. AF. RBC's ordered for Hgb 7.0 Pt eager for discharge home. Continue with POC.  Report to oncoming RN

## 2020-01-15 NOTE — PROGRESS NOTES
Problem: Falls - Risk of 
Goal: *Absence of Falls Description Document Louise Kwok Fall Risk and appropriate interventions in the flowsheet. Outcome: Progressing Towards Goal 
Note: Fall Risk Interventions: 
Mobility Interventions: Strengthening exercises (ROM-active/passive), Patient to call before getting OOB Mentation Interventions: Adequate sleep, hydration, pain control Medication Interventions: Patient to call before getting OOB, Teach patient to arise slowly Elimination Interventions: Call light in reach, Patient to call for help with toileting needs History of Falls Interventions: Room close to nurse's station Problem: Patient Education: Go to Patient Education Activity Goal: Patient/Family Education Outcome: Progressing Towards Goal 
  
Problem: Chemotherapy, Day 3 to Discharge Goal: Activity/Safety Outcome: Progressing Towards Goal 
Goal: Consults, if ordered Outcome: Progressing Towards Goal 
Goal: Diagnostic Test/Procedures Outcome: Progressing Towards Goal 
Goal: Nutrition/Diet Outcome: Progressing Towards Goal 
Goal: Discharge Planning Outcome: Progressing Towards Goal 
Goal: Medications Outcome: Progressing Towards Goal 
Goal: Respiratory Outcome: Progressing Towards Goal 
Goal: Treatments/Interventions/Procedures Outcome: Progressing Towards Goal 
Goal: Psychosocial 
Outcome: Progressing Towards Goal 
Goal: *Optimal pain control at patient's stated goal 
Outcome: Progressing Towards Goal 
Goal: *Hemodynamically stable Outcome: Progressing Towards Goal 
Goal: *Adequate oxygenation Outcome: Progressing Towards Goal 
  
Problem: Chemotherapy Discharge Outcomes Goal: *Verbalizes understanding and describes prescribed diet Outcome: Progressing Towards Goal 
Goal: *Describes follow-up/return visits to physicians Outcome: Progressing Towards Goal 
Goal: *Describes home care/support arrangements established based on need Outcome: Progressing Towards Goal 
 Goal: *Describes available resources and support systems Outcome: Progressing Towards Goal 
Goal: *Anxiety reduced or absent Outcome: Progressing Towards Goal 
Goal: *Understands and describes signs and symptoms to report to providers(Stroke Metric) Outcome: Progressing Towards Goal 
Goal: *Hemodynamically stable Outcome: Progressing Towards Goal 
Goal: *Tolerating diet Outcome: Progressing Towards Goal 
Goal: *Verbalizes understanding and describes medication purposes and frequencies Outcome: Progressing Towards Goal 
Goal: *Venous access site with positive blood return and without signs and symptoms of infection Outcome: Progressing Towards Goal 
Goal: *Verbalizes understanding of bowel regimen Outcome: Progressing Towards Goal 
  
Problem: Nutrition Deficit Goal: *Optimize nutritional status Outcome: Progressing Towards Goal 
  
Problem: Anemia Care Plan (Adult and Pediatric) Goal: *Labs within defined limits Outcome: Progressing Towards Goal 
Goal: *Tolerates increased activity Outcome: Progressing Towards Goal 
  
Problem: Nausea/Vomiting (Adult) Goal: *Absence of nausea/vomiting Outcome: Progressing Towards Goal 
  
Problem: Patient Education: Go to Patient Education Activity Goal: Patient/Family Education Outcome: Progressing Towards Goal 
  
Problem: Pain Goal: *Control of Pain Outcome: Progressing Towards Goal 
  
Problem: Patient Education: Go to Patient Education Activity Goal: Patient/Family Education Outcome: Progressing Towards Goal 
  
Problem: Impaired Skin Integrity/Pressure Injury Treatment Goal: *Improvement of Existing Pressure Injury Outcome: Progressing Towards Goal 
Goal: *Prevention of pressure injury Description Document Ameya Scale and appropriate interventions in the flowsheet.  
Outcome: Progressing Towards Goal 
Note: Pressure Injury Interventions: 
Sensory Interventions: Assess need for specialty bed, Assess changes in LOC, Check visual cues for pain, Keep linens dry and wrinkle-free, Maintain/enhance activity level Moisture Interventions: Apply protective barrier, creams and emollients, Assess need for specialty bed Activity Interventions: Increase time out of bed, Pressure redistribution bed/mattress(bed type) Mobility Interventions: Pressure redistribution bed/mattress (bed type) Nutrition Interventions: Document food/fluid/supplement intake Friction and Shear Interventions: Apply protective barrier, creams and emollients Problem: Patient Education: Go to Patient Education Activity Goal: Patient/Family Education Outcome: Progressing Towards Goal

## 2020-01-15 NOTE — PROGRESS NOTES
END OF SHIFT NOTE: 
 
Intake/Output 01/15 0701 - 01/15 1900 In: 1150 [P.O.:840] Out: 950 [Urine:950] Voiding: YES Catheter: NO 
Drain:   
 
 
 
 
 
Stool:  1 occurrences. Stool Assessment Stool Color: Madelaine Lacy (01/12/20 1805) Stool Appearance: Soft (01/15/20 0902) Stool Amount: Small (01/13/20 0957) Stool Source/Status: Rectum (01/13/20 0957) Emesis:  0 occurrences. VITAL SIGNS Patient Vitals for the past 12 hrs: 
 Temp Pulse Resp BP SpO2  
01/15/20 1809 98.6 °F (37 °C) 90 17 141/68   
01/15/20 1647 98.2 °F (36.8 °C) 81 17 134/67   
01/15/20 1621 98.3 °F (36.8 °C) 88 16 134/78 99 % 01/15/20 1052 98.3 °F (36.8 °C) 99 18 117/65 97 % 01/15/20 0810 98.6 °F (37 °C) 91 17 131/63 91 % Pain Assessment Pain 1 Pain Scale 1: Numeric (0 - 10) (01/15/20 0810) Pain Intensity 1: 0 (01/15/20 0810) Patient Stated Pain Goal: 0 (01/15/20 0810) Pain Reassessment 1: Patient resting w/respiratory rate greater than 10 (01/09/20 2592) Ambulating Yes Additional Information: Pt ambulated halls and tolerated well. Pt received 1 unit of SD BC and tolerated well. Vancomycin trough resulted at 13.2. No other needs at this time. Shift report given to oncoming nurse at the bedside. Vanita Eid

## 2020-01-15 NOTE — PROGRESS NOTES
Pharmacokinetic Consult to Pharmacist 
 
Donna Chris is a 76 y.o. female being treated for cellulitis with vancomycin. Weight: 80.3 kg (177 lb 1.6 oz) Lab Results Component Value Date/Time BUN 16 01/15/2020 03:38 AM  
 Creatinine 0.76 01/15/2020 03:38 AM  
 WBC 0.7 (LL) 01/15/2020 03:38 AM  
 Procalcitonin 0.1 09/21/2019 06:50 PM  
 Lactic Acid (POC) 0.67 09/21/2019 08:51 PM  
  
Estimated Creatinine Clearance: 68.7 mL/min (based on SCr of 0.76 mg/dL). Lab Results Component Value Date/Time Vancomycin,trough 13.2 01/15/2020 01:57 PM  
 
 
Day 3/7 of vancomycin. Goal trough is 10-20. Tr = 13.2, drawn a bit late due to some access issues. No changes, continue 1g q12h. Will continue to follow patient. Thank you, Nell Hodges, Pharm. D. Clinical Pharmacist 
[phone]

## 2020-01-15 NOTE — PROGRESS NOTES
's follow-up visit to convey care and concern. Mrs. Sarah Black expressed her excitement about the plan to discharge home on Friday. Mrs. Sarah Black was in good spirits and I offered spiritual interventions, including prayer. Prabha Kim MDiv Board Certified [employer]

## 2020-01-15 NOTE — PROGRESS NOTES
Chart screened by  for discharge planning. Bone Marrow Bx on Friday 17th Hopefully, discharge on Friday or the weekend . No needs identified at this time. Please consult  if any new issues arise. Case management will continue to follow.

## 2020-01-15 NOTE — PROGRESS NOTES
[facility] Hematology & Oncology Inpatient Hematology / Oncology Progress Note Admission Date: 2019  5:52 PM 
Reason for Admission/Hospital Course: Admission for antineoplastic chemotherapy [Z51.11] 24 Hour Events: 
Afeb Day 16 FLAG 
L first toe appears erythematous- continues on vancomycin WBC up to 0.7 today  at bedside ROS: 
Constitutional: Negative for fever, chills, weakness, malaise, fatigue. CV: Negative for chest pain, palpitations, edema. Respiratory: Negative for dyspnea, cough, wheezing. GI: Negative for nausea, abdominal pain, diarrhea. 10 point review of systems is otherwise negative with the exception of the elements mentioned above in the HPI. No Known Allergies OBJECTIVE: 
Patient Vitals for the past 8 hrs: 
 BP Temp Pulse Resp SpO2  
01/15/20 0810 131/63 98.6 °F (37 °C) 91 17 91 % 01/15/20 0330 143/63 98.8 °F (37.1 °C) 88 16 94 % Temp (24hrs), Av.6 °F (37 °C), Min:98.1 °F (36.7 °C), Max:98.9 °F (37.2 °C) 
 
01/15 0701 - 01/15 1900 In: 600 [P.O.:600] Out: 500 [Urine:500] Physical Exam: 
Constitutional: Well developed, well nourished female in no acute distress, sitting comfortably on the bed HEENT: Normocephalic and atraumatic. Oropharynx is clear, mucous membranes are moist.  Extraocular muscles are intact. Sclerae anicteric. Skin Warm and dry. No bruising and no rash noted. No pallor. +L first toe with increased erythema. Respiratory Lungs are clear to auscultation bilaterally without wheezes, rales or rhonchi, normal air exchange without accessory muscle use. CVS Normal rate, regular rhythm and normal S1 and S2. No murmurs, gallops, or rubs. Abdomen Soft, nontender and nondistended, normoactive bowel sounds. No palpable mass. No hepatosplenomegaly. Neuro Grossly nonfocal with no obvious sensory or motor deficits. MSK Normal range of motion in general.  No edema and no tenderness. Psych Appropriate mood and affect. Labs: 
   
Recent Labs  
  01/15/20 
0338 01/14/20 
0221 01/13/20 
0421 WBC 0.7* 0.2* 0.1*  
RBC 2.23* 2.31* 2.54* HGB 7.0* 7.3* 8.2* HCT 21.4* 22.1* 24.3*  
MCV 96.0 95.7 95.7 MCH 31.4 31.6 32.3 MCHC 32.7 33.0 33.7 RDW 17.2* 17.3* 17.5* PLT 13* 26* 7*  
GRANS 77  --   --   
LYMPH 4*  --   -- MONOS 18*  --   --   
EOS 0*  --   --   
BASOS 1  --   --   
IG 0  --   --   
DF AUTOMATED MANUAL MANUAL ANEU 0.6*  --   --   
ABL 0.0*  --   --   
ABM 0.1  --   --   
ALEKSANDR 0.0  --   --   
ABB 0.0  --   --   
AIG 0.0  --   --   
 
  
Recent Labs  
  01/15/20 
0338 01/14/20 
0221 01/13/20 
0421  141 141  
K 4.0 4.1 4.1 * 107 108* CO2 27 28 28 AGAP 5* 6* 5*  
GLU 95 96 96 BUN 16 19 18 CREA 0.76 0.79 0.76 GFRAA >60 >60 >60 GFRNA >60 >60 >60  
CA 9.7 9.3 9.6 SGOT 14* 12* 10* AP 91 89 93  
TP 6.0* 6.1* 6.4 ALB 3.1* 3.1* 3.3 GLOB 2.9 3.0 3.1 AGRAT 1.1* 1.0* 1.1*  
MG 1.8 1.8 2.0 Imaging: n/a Medications: 
Current Facility-Administered Medications Medication Dose Route Frequency  Vancomycin Trough Level Reminder   Other ONCE  
 senna-docusate (PERICOLACE) 8.6-50 mg per tablet 1 Tab  1 Tab Oral DAILY  vancomycin (VANCOCIN) 1,000 mg in 0.9% sodium chloride (MBP/ADV) 250 mL  1,000 mg IntraVENous Q12H  
 0.9% sodium chloride infusion 250 mL  250 mL IntraVENous PRN  
 levoFLOXacin (LEVAQUIN) tablet 500 mg  500 mg Oral Q24H  
 diphenhydrAMINE (BENADRYL) capsule 25 mg  25 mg Oral Q6H PRN  
 acetaminophen (TYLENOL) tablet 650 mg  650 mg Oral Q6H PRN  
 central line flush (saline) syringe 10 mL  10 mL InterCATHeter PRN  
 heparin (porcine) pf 300 Units  300 Units InterCATHeter PRN  
 alum-mag hydroxide-simeth (MYLANTA) oral suspension 30 mL  30 mL Oral Q4H PRN  
 zolpidem (AMBIEN) tablet 10 mg  10 mg Oral QHS PRN  polyethylene glycol (MIRALAX) packet 17 g  17 g Oral DAILY  LORazepam (ATIVAN) injection 0.5 mg  0.5 mg IntraVENous Q6H PRN  tbo-filgrastim (GRANIX) injection 480 mcg  480 mcg SubCUTAneous QHS  vit C,G-Mi-kagbj-lutein-zeaxan (PRESERVISION AREDS-2) capsule 1 Cap (Patient Supplied)  1 Cap Oral DAILY  acyclovir (ZOVIRAX) tablet 400 mg  400 mg Oral BID  allopurinol (ZYLOPRIM) tablet 300 mg  300 mg Oral DAILY  cholecalciferol (VITAMIN D3) (400 Units /10 mcg) tablet 2 Tab  800 Units Oral DAILY  DULoxetine (CYMBALTA) capsule 30 mg  30 mg Oral DAILY  lidocaine-prilocaine (EMLA) 2.5-2.5 % cream   Topical PRN  
 LORazepam (ATIVAN) tablet 1 mg  1 mg Oral QHS  potassium chloride (K-DUR, KLOR-CON) SR tablet 20 mEq  20 mEq Oral BID  pravastatin (PRAVACHOL) tablet 10 mg  10 mg Oral QHS  voriconazole (VFEND) tablet 200 mg  200 mg Oral Q12H  
 ondansetron (ZOFRAN) injection 4 mg  4 mg IntraVENous Q4H PRN  prochlorperazine (COMPAZINE) with saline injection 5 mg  5 mg IntraVENous Q6H PRN  
 HYDROcodone-acetaminophen (NORCO) 5-325 mg per tablet 1 Tab  1 Tab Oral Q6H PRN  
 morphine injection 2 mg  2 mg IntraVENous Q4H PRN  
 
 
 
ASSESSMENT: 
 
Problem List  Date Reviewed: 12/19/2019 Codes Class Noted Febrile neutropenia (HCC) ICD-10-CM: D70.9, R50.81 ICD-9-CM: 288.00, 780.61  9/21/2019 Pancytopenia due to antineoplastic chemotherapy [facility]) ICD-10-CM: D61.810, T45.1X5A 
ICD-9-CM: 284.11, E933.1  6/12/2019 Cellulitis of neck ICD-10-CM: N81.848 ICD-9-CM: 682.1  6/12/2019 Immunocompromised status associated with infection ICD-10-CM: B99.9 ICD-9-CM: 136.9  6/12/2019 Port or reservoir infection ICD-10-CM: V77.199G ICD-9-CM: 999.33  6/12/2019 Acute myeloid leukemia not having achieved remission ([email or web address] ICD-10-CM: C92.00 ICD-9-CM: 205.00  5/9/2019 Admission for antineoplastic chemotherapy ICD-10-CM: Z51.11 ICD-9-CM: V58.11  5/5/2019  AML (acute myeloblastic leukemia) ([facility] [[email or web address] 75.) ICD-10-CM: C92.00 
 ICD-9-CM: 205.00  4/28/2019 Weakness generalized ICD-10-CM: R53.1 ICD-9-CM: 780.79  4/28/2019 Pancytopenia ([facility].) ICD-10-CM: I48.152 ICD-9-CM: 284.19  4/28/2019 Thrombocytopenia ([facility] 75.) ICD-10-CM: D69.6 ICD-9-CM: 287.5  4/27/2019 Ms. Ese Monge is a 76 y.o. female admitted on 12/30/2019 with a primary diagnosis of There were no encounter diagnoses. .   
  
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 Subsequently on Decitabine plus Gilteritinib x 3 cycles w/ persistent disease. S/p FLAG-VENITA 11/19 w persistent disease though improvement in blast percentage as well as cytopenias. FLAG-VENITA course was complicated by neutropenic fever and E fecalis/alpha strep bacteremia requiring abx course,  ID transitioned to oral Augmentin at discharge which she completed, port was retained. Counts have clearly improved with improvement in [address] and thrombocytopenia however BM biopsy with persistent disease open (cellularity described at 30% with 10-20% residual blasts on IHC). Results shared with patient, various options discussed moving forward. For now she will try to enjoy holidays/[place] with her family and return next week for likely reinduction. Will consider intermediate dose rivera-C based regimen. Seen today 12/30/2019: Enjoyed [place] at home. Some fatigue persists. Appetite slowly improving. Discussed various options again, counseled clearly improved: Various options discussed including intermediate dose rivera-C versus reinduction with modified FLAG (will hold off on Caryle Felicia given past anthracycline exposure), patient has opted for reinduction with FL AG which is reasonable. Denies any recent fevers or chills, okay to admit for reinduction. Continue prophylactic antibiotics. Will repeat BM biopsy on count recovery. She will stay in house until counts recover.  
 
PLAN: 
AML 
- admit for re-induction w modified dose FLAG 
 12/31 Day 1 FLAG. 1/6 Day 7 FLAG. Tolerating treatment well. Awaiting count recovery. Start Granix. 1/11 Day 12. Awaiting count recovery. On Granix. 1/13 Day 14 FLAG. Continues granix. WBC 0.1 1/15 D 16 FLAG. Continues on granix. Counts starting to recover as WBC up to 0.7 and ANC 0.6 today. Will plan for bone marrow biopsy on Friday. Ingrown toenail / Cellulitis 12/31 Was going to defer treatment pending podiatrist consult, but no podiatrist will come see pt while IP. Gave pt the option of being discharged to see podiatrist prior to proceeding with treatment vs starting treatment along with Vanc. Pt prefers to start treatment. Vanc ordered. 1/1 on Vanc. Toe covered with bandaid today when seen. 1/2  Toe appears much improved. Con't Vanc. 1/3 Infx appears resolved. Will continue Vanc through weekend and will likely DC early next week. 1/4 Infx resolved. Plan to DC Vanc after 7 days of treatment. 1/6 Con't Vanc (D6).  
1/7 Resolved. Completed Vanc. 1/11 L first toe appears more erythematous today. Pt reports bumping it several times on side table yesterday. Started using neosporin yesterday. Will add prophylactic Levaquin. 1/13 adding vanc back x 7 days Bradycardia 1/4 Asymptomatic. EKG with sinus irish. Pancytopenia secondary to chemotherapy - Transfuse prn per [facility] SOPs 
1/11 Transfuse 1 unit PRBCs. 1/15 PRBCs today Continue home meds Prophylactic Antibx: Acyclovir, Voriconazole, Levaquin [facility] SOPs SCDs for DVT prophylaxis (AC contraindicated d/t thrombocytopenia) Disposition: Plan for bone marrow biopsy on Friday and hopeful discharge on Friday or over the weekend if counts continue to improve. RTC within week of discharge. Goals and plan of care reviewed with the patient. All questions answered to the best of our ability. 
 
 
  
 
 
  
Rosie Montoya, GEETA [facility] [address] [address] Office : [phone] Fax : [phone] Attending Addendum: 
I have personally performed a face to face diagnostic evaluation on this patient. I have reviewed and agree with the care plan as documented above by Rosie Duenas N.P.  My findings are as follows: Patient appears lethargic, heart rate regular without murmurs, abdomen is non-tender, bowel sounds are positive. 74 female h/o port infection, AML, FLT3 ITD +ve with NPM1 and TET2 mutation, failed induction with 7+3 and Midostaurin. Subsequently on Decitabine plus Gilteritinib x 3 cycles w/ persistent disease. S/p FLAG-VENITA 11/19 w persistent disease though improvement in blast percentage as well as cytopenias.  He is now admitted for reinduction with dose modified FLAG D+16.  Left toe erythema persists, restarted IV vancomycin 1/13/19: toe about same today.  Monitor closely for fevers or worsening signs of infection.  ANC improved, remains on Granix. BM biopsy Friday. Transfuse blood products as needed.  Continue prophylactic antibiotics including acyclovir and voriconazole.  P.o. intake and ambulation encouraged. 
  
  
Total time 35 min 50% in direct consultation about the patient's diagnosis and management 
  
  
 
  
Santhosh Guy MD 
[facility] Hematology/Oncology [address] [address] Office : [phone] Fax : [phone]

## 2020-01-16 NOTE — PROGRESS NOTES
[facility] Hematology & Oncology Inpatient Hematology / Oncology Progress Note Admission Date: 2019  5:52 PM 
Reason for Admission/Hospital Course: Admission for antineoplastic chemotherapy [Z51.11] 24 Hour Events: 
Afeb Day 17 FLAG 
ANC 1.2 Platelets today BMbx tomorrow 
?discharge tomorrow  at bedside ROS: 
Constitutional: Negative for fever, chills, weakness, malaise, fatigue. CV: Negative for chest pain, palpitations, edema. Respiratory: Negative for dyspnea, cough, wheezing. GI: Negative for nausea, abdominal pain, diarrhea. 10 point review of systems is otherwise negative with the exception of the elements mentioned above in the HPI. No Known Allergies OBJECTIVE: 
Patient Vitals for the past 8 hrs: 
 BP Temp Pulse Resp SpO2  
20 0803 138/70 99.2 °F (37.3 °C) (!) 110 18 94 % 20 0347 146/79 99.1 °F (37.3 °C) 92 16 93 % Temp (24hrs), Av.7 °F (37.1 °C), Min:98.2 °F (36.8 °C), Max:99.2 °F (37.3 °C) 
 
 0701 -  1900 In: 360 [P.O.:360] Out: - Physical Exam: 
Constitutional: Well developed, well nourished female in no acute distress, sitting comfortably on the bed HEENT: Normocephalic and atraumatic. Oropharynx is clear, mucous membranes are moist.  Extraocular muscles are intact. Sclerae anicteric. Skin Warm and dry. No bruising and no rash noted. No pallor. +L first toe improved Respiratory Lungs are clear to auscultation bilaterally without wheezes, rales or rhonchi, normal air exchange without accessory muscle use. CVS Normal rate, regular rhythm and normal S1 and S2. No murmurs, gallops, or rubs. Abdomen Soft, nontender and nondistended, normoactive bowel sounds. No palpable mass. No hepatosplenomegaly. Neuro Grossly nonfocal with no obvious sensory or motor deficits. MSK Normal range of motion in general.  No edema and no tenderness. Psych Appropriate mood and affect. Labs: Recent Labs  
  01/16/20 
0358 01/15/20 
0338 01/14/20 
0221 WBC 1.5* 0.7* 0.2*  
RBC 2.60* 2.23* 2.31* HGB 8.4* 7.0* 7.3* HCT 24.7* 21.4* 22.1*  
MCV 95.0 96.0 95.7 MCH 32.3 31.4 31.6 MCHC 34.0 32.7 33.0  
RDW 16.5* 17.2* 17.3*  
PLT 9* 13* 26* GRANS 74 77  --   
LYMPH 1* 4*  --   
MONOS 16* 18*  --   
EOS 1 0*  --   
BASOS 1 1  --   
IG 7* 0  --   
DF AUTOMATED AUTOMATED MANUAL ANEU 1.2* 0.6*  -- ABL 0.0* 0.0*  --   
ABM 0.2 0.1  --   
ALEKSANDR 0.0 0.0  --   
ABB 0.0 0.0  --   
AIG 0.1 0.0  --   
 
  
Recent Labs  
  01/16/20 
0358 01/15/20 
0338 01/14/20 
0221  141 141  
K 3.9 4.0 4.1 * 109* 107 CO2 26 27 28 AGAP 6* 5* 6*  
GLU 93 95 96 BUN 14 16 19 CREA 0.71 0.76 0.79 GFRAA >60 >60 >60 GFRNA >60 >60 >60  
CA 9.5 9.7 9.3 SGOT 12* 14* 12* AP 99 91 89  
TP 6.0* 6.0* 6.1* ALB 3.0* 3.1* 3.1*  
GLOB 3.0 2.9 3.0 AGRAT 1.0* 1.1* 1.0*  
MG 2.0 1.8 1.8 Imaging: n/a Medications: 
Current Facility-Administered Medications Medication Dose Route Frequency  0.9% sodium chloride infusion 250 mL  250 mL IntraVENous PRN  
 senna-docusate (PERICOLACE) 8.6-50 mg per tablet 1 Tab  1 Tab Oral DAILY  vancomycin (VANCOCIN) 1,000 mg in 0.9% sodium chloride (MBP/ADV) 250 mL  1,000 mg IntraVENous Q12H  
 0.9% sodium chloride infusion 250 mL  250 mL IntraVENous PRN  
 levoFLOXacin (LEVAQUIN) tablet 500 mg  500 mg Oral Q24H  
 diphenhydrAMINE (BENADRYL) capsule 25 mg  25 mg Oral Q6H PRN  
 acetaminophen (TYLENOL) tablet 650 mg  650 mg Oral Q6H PRN  
 central line flush (saline) syringe 10 mL  10 mL InterCATHeter PRN  
 heparin (porcine) pf 300 Units  300 Units InterCATHeter PRN  
 alum-mag hydroxide-simeth (MYLANTA) oral suspension 30 mL  30 mL Oral Q4H PRN  
 zolpidem (AMBIEN) tablet 10 mg  10 mg Oral QHS PRN  polyethylene glycol (MIRALAX) packet 17 g  17 g Oral DAILY  LORazepam (ATIVAN) injection 0.5 mg  0.5 mg IntraVENous Q6H PRN  
  tbo-filgrastim (GRANIX) injection 480 mcg  480 mcg SubCUTAneous QHS  vit C,J-Ij-gomtp-lutein-zeaxan (PRESERVISION AREDS-2) capsule 1 Cap (Patient Supplied)  1 Cap Oral DAILY  acyclovir (ZOVIRAX) tablet 400 mg  400 mg Oral BID  allopurinol (ZYLOPRIM) tablet 300 mg  300 mg Oral DAILY  cholecalciferol (VITAMIN D3) (400 Units /10 mcg) tablet 2 Tab  800 Units Oral DAILY  DULoxetine (CYMBALTA) capsule 30 mg  30 mg Oral DAILY  lidocaine-prilocaine (EMLA) 2.5-2.5 % cream   Topical PRN  
 LORazepam (ATIVAN) tablet 1 mg  1 mg Oral QHS  potassium chloride (K-DUR, KLOR-CON) SR tablet 20 mEq  20 mEq Oral BID  pravastatin (PRAVACHOL) tablet 10 mg  10 mg Oral QHS  voriconazole (VFEND) tablet 200 mg  200 mg Oral Q12H  
 ondansetron (ZOFRAN) injection 4 mg  4 mg IntraVENous Q4H PRN  prochlorperazine (COMPAZINE) with saline injection 5 mg  5 mg IntraVENous Q6H PRN  
 HYDROcodone-acetaminophen (NORCO) 5-325 mg per tablet 1 Tab  1 Tab Oral Q6H PRN  
 morphine injection 2 mg  2 mg IntraVENous Q4H PRN  
 
 
 
ASSESSMENT: 
 
Problem List  Date Reviewed: 12/19/2019 Codes Class Noted Febrile neutropenia (HCC) ICD-10-CM: D70.9, R50.81 ICD-9-CM: 288.00, 780.61  9/21/2019 Pancytopenia due to antineoplastic chemotherapy [facility]) ICD-10-CM: D61.810, T45.1X5A 
ICD-9-CM: 284.11, E933.1  6/12/2019 Cellulitis of neck ICD-10-CM: E91.013 ICD-9-CM: 682.1  6/12/2019 Immunocompromised status associated with infection ICD-10-CM: B99.9 ICD-9-CM: 136.9  6/12/2019 Port or reservoir infection ICD-10-CM: Z61.677W ICD-9-CM: 999.33  6/12/2019 Acute myeloid [facility] not [facility] ICD-10-CM: C92.00 ICD-9-CM: 205.00  5/9/2019 Admission for antineoplastic chemotherapy ICD-10-CM: Z51.11 ICD-9-CM: V58.11  5/5/2019 AML (acute myeloblastic leukemia) ([facility] ICD-10-CM: C92.00 ICD-9-CM: 205.00  4/28/2019 Weakness generalized ICD-10-CM: R53.1 ICD-9-CM: 780.79  4/28/2019 P[facility]topenia ([facility] ICD-10-CM: E77.178 ICD-9-CM: 284.19  4/28/2019 Thrombocytopenia ([facility] ICD-10-CM: D69.6 ICD-9-CM: 287.5  4/27/2019 Ms. Nell Crawford is a 76 y.o. female admitted on 12/30/2019 with a primary diagnosis of There were no encounter diagnoses. .   
  
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 Subsequently on Decitabine plus Gilteritinib x 3 cycles w/ persistent disease. S/p FLAG-VENITA 11/19 w persistent disease though improvement in blast percentage as well as cytopenias. FLAG-VENITA course was complicated by neutropenic fever and E fecalis/alpha strep bacteremia requiring abx course,  ID transitioned to oral Augmentin at discharge which she completed, port was retained. Counts have clearly improved with improvement in [address] and thrombocytopenia however BM biopsy with persistent disease open (cellularity described at 30% with 10-20% residual blasts on IHC). Results shared with patient, various options discussed moving forward. For now she will try to enjoy holidays/[place] with her family and return next week for likely reinduction. Will consider intermediate dose rivera-C based regimen. Seen today 12/30/2019: Enjoyed [place] at home. Some fatigue persists. Appetite slowly improving. Discussed various options again, counseled clearly improved: Various options discussed including intermediate dose rivera-C versus reinduction with modified FLAG (will hold off on Darletta Marce given past anthracycline exposure), patient has opted for reinduction with FL AG which is reasonable. Denies any recent fevers or chills, okay to admit for reinduction. Continue prophylactic antibiotics. Will repeat BM biopsy on count recovery. She will stay in house until counts recover. PLAN: 
AML 
- admit for re-induction w modified dose FLAG 
12/31 Day 1 FLAG. 1/6 Day 7 FLAG. Tolerating treatment well. Awaiting count recovery. Start Granix. 1/11 Day 12. Awaiting count recovery. On Granix. 1/13 Day 14 FLAG. Continues granix. WBC 0.1 1/15 D 16 FLAG. Continues on granix. Counts starting to recover as WBC up to 0.7 and ANC 0.6 today. Will plan for bone marrow biopsy on Friday. 1/16 ANC 1.2. Platelets today. BMbx tomorrow. May need platelets in am prior to procedure. Ingrown toenail / Cellulitis 12/31 Was going to defer treatment pending podiatrist consult, but no podiatrist will come see pt while IP. Gave pt the option of being discharged to see podiatrist prior to proceeding with treatment vs starting treatment along with Vanc. Pt prefers to start treatment. Vanc ordered. 1/1 on Vanc. Toe covered with bandaid today when seen. 1/2  Toe appears much improved. Con't Vanc. 1/3 Infx appears resolved. Will continue Vanc through weekend and will likely DC early next week. 1/4 Infx resolved. Plan to DC Vanc after 7 days of treatment. 1/6 Con't Vanc (D6).  
1/7 Resolved. Completed Vanc. 1/11 L first toe appears more erythematous today. Pt reports bumping it several times on side table yesterday. Started using neosporin yesterday. Will add prophylactic Levaquin. 1/13 adding vanc back x 7 days Bradycardia 1/4 Asymptomatic. EKG with sinus irish. Pancytopenia secondary to chemotherapy - Transfuse prn per [facility] SOPs 
1/11 Transfuse 1 unit PRBCs. 1/15 PRBCs today Continue home meds Prophylactic Antibx: Acyclovir, Voriconazole, Levaquin [facility] SOPs SCDs for DVT prophylaxis (AC contraindicated d/t thrombocytopenia) Disposition: Plan for bone marrow biopsy on Friday and hopeful discharge on Friday or over the weekend if counts continue to improve. RTC within week of discharge. Goals and plan of care reviewed with the patient.   All questions answered to the best of our ability. 
 
 
  
 
 
  
Elijah Schneider NP 
 
 [facility] Hematology and Oncology [address] [facility] [address] [address] Office : [phone] Fax : [phone] Attending Addendum: 
I have personally performed a face to face diagnostic evaluation on this patient. I have reviewed and agree with the care plan as documented above by Lilly Funes N.P.  My findings are as follows: Patient appears lethargic, heart rate regular without murmurs, abdomen is non-tender, bowel sounds are positive. 74 female h/o port infection, AML, FLT3 ITD +ve with NPM1 and TET2 mutation, failed induction with 7+3 and Midostaurin. Subsequently on Decitabine plus Gilteritinib x 3 cycles w/ persistent disease. S/p FLAG-VENITA 11/19 w persistent disease though improvement in blast percentage as well as cytopenias.  He is now admitted for reinduction with dose modified FLAG D+17.  Left toe erythema persists, restarted IV vancomycin 1/13/19: toe cellulitis imprved.  Monitor closely for fevers or worsening signs of infection.  ANC improved, remains on Granix. BM biopsy Friday. Transfuse blood products as needed.  Continue prophylactic antibiotics including acyclovir and voriconazole.  P.o. intake and ambulation encouraged. 
  
  
Total time 35 min 50% in direct consultation about the patient's diagnosis and management 
  
  
 
  
Rosina Shepard MD 
[facility] Hematology/Oncology [address] [address] Office : [phone] Fax : [phone]

## 2020-01-16 NOTE — PROGRESS NOTES
Massage Therapy Note Gentle massage to lower legs and feet while patient supine. Massage given with hypoallergenic massage lotion. Patient more relaxed after, expressed appreciation for massage. No follow up visit planned. MAC Xie

## 2020-01-16 NOTE — PROGRESS NOTES
Problem: Falls - Risk of 
Goal: *Absence of Falls Description Document Isela Ochoa Fall Risk and appropriate interventions in the flowsheet. Outcome: Progressing Towards Goal 
Note: Fall Risk Interventions: 
Mobility Interventions: Communicate number of staff needed for ambulation/transfer, Patient to call before getting OOB Mentation Interventions: Evaluate medications/consider consulting pharmacy Medication Interventions: Evaluate medications/consider consulting pharmacy Elimination Interventions: Patient to call for help with toileting needs, Call light in reach History of Falls Interventions: Investigate reason for fall, Room close to nurse's station

## 2020-01-17 NOTE — PROGRESS NOTES
Pt to CT via stretcher. IR Nurse Pre-Procedure Checklist Part 2 Consent signed: Yes 
 
H&P complete:  Not applicable Antibiotics: Not applicable Airway/Mallampati Done: Yes Shaved: Not applicable Pregnancy Form:Not applicable Patient Position: Yes MD Side: Yes Biopsy Worksheet: Not applicable Specimen Medium: Not applicable Lab in room to collect specimen

## 2020-01-17 NOTE — PROGRESS NOTES
END OF SHIFT NOTE: 
 
Intake/Output No intake/output data recorded. Voiding: YES Catheter: NO 
Drain:   
 
 
 
 
 
Stool:  1 occurrences. Stool Assessment Stool Color: Farley Buster (01/12/20 1805) Stool Appearance: Soft(per patient) (01/16/20 0825) Stool Amount: Small (01/13/20 0957) Stool Source/Status: Rectum (01/13/20 0957) Emesis:  0 occurrences. VITAL SIGNS Patient Vitals for the past 12 hrs: 
 Temp Pulse Resp BP SpO2  
01/16/20 1311 98.2 °F (36.8 °C) 97 16 160/62 97 % 01/16/20 1206 97.9 °F (36.6 °C) 90 17 138/68 96 % 01/16/20 1101 98.4 °F (36.9 °C) 93 16 136/65 97 % 01/16/20 0803 99.2 °F (37.3 °C) (!) 110 18 138/70 94 % Pain Assessment Pain 1 Pain Scale 1: Numeric (0 - 10) (01/16/20 1445) Pain Intensity 1: 0 (01/16/20 1445) Patient Stated Pain Goal: 0 (01/16/20 1445) Pain Reassessment 1: Patient resting w/respiratory rate greater than 10 (01/09/20 0121) Ambulating Yes Additional Information:  
-Pt had a good day, ate well and ambulated hallways 
-1 unit of plts given and tolerated well 
-Bmbx tomorrow in IR, plts need to be greater than 50,000 before biopsy 
-d/c after bmbx Shift report given to oncoming nurse at the bedside.  
 
Leonides Weinberg RN

## 2020-01-17 NOTE — PROGRESS NOTES
END OF SHIFT NOTE: 
 
Intake/Output 01/16 1901 - 01/17 0700 In: 542 [P.O.:240; I.V.:302] Out: 2650 [Urine:2650] Voiding: YES Catheter: NO 
Drain:   
 
 
 
 
 
Stool:  0 occurrences. Stool Assessment Stool Color: Brenda Ng (01/12/20 1805) Stool Appearance: Soft(per patient) (01/16/20 0825) Stool Amount: Small (01/13/20 0957) Stool Source/Status: Rectum (01/13/20 0957) Emesis:  0 occurrences. VITAL SIGNS Patient Vitals for the past 12 hrs: 
 Temp Pulse Resp BP SpO2  
01/17/20 0550 98.5 °F (36.9 °C) 86 16 124/55 93 % 01/17/20 0535 98.8 °F (37.1 °C) 87 16 135/58 96 % 01/17/20 0327 98.3 °F (36.8 °C) 95 16 132/69 95 % 01/16/20 2245 98.1 °F (36.7 °C) 100 16 142/67 97 % 01/16/20 2011 98.8 °F (37.1 °C) 92 16 150/70 97 % Pain Assessment Pain 1 Pain Scale 1: Numeric (0 - 10) (01/17/20 0535) Pain Intensity 1: 0 (01/17/20 0535) Patient Stated Pain Goal: 0 (01/16/20 1445) Pain Reassessment 1: Patient resting w/respiratory rate greater than 10 (01/09/20 2915) Ambulating Yes Additional Information: Patient received 1 unit of platelets. Otherwise, patient had an uneventful restful night. Platelet levels drawn, waiting results. Shift report given to oncoming nurse Solo at the bedside. Champ Mcpherson

## 2020-01-17 NOTE — DISCHARGE SUMMARY
[facility] Hematology & Oncology: Inpatient Hematology / Oncology Discharge Summary Note Patient ID: Tyrone Gonzalez [id number] 
45 y.o. 
1945 Admit Date: 12/30/2019 Discharge Date: 1/17/2020 Admission Diagnoses: Admission for antineoplastic chemotherapy [Z51.11] Discharge Diagnoses: 
Principal Diagnosis: <principal problem not specified> Active Problems: 
  Admission for antineoplastic chemotherapy (5/5/2019) Hospital Course: 
Mrs. 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 Subsequently on Decitabine plus Gilteritinib x 3 cycles w/ persistent disease. S/p FLAG-VENITA 11/19 w persistent disease though improvement in blast percentage as well as cytopenias. FLAG-VENITA course was complicated by neutropenic fever and E fecalis/alpha strep bacteremia requiring abx course,  ID transitioned to oral Augmentin at discharge which she completed, port was retained. Counts have clearly improved with improvement in [address] and thrombocytopenia however BM biopsy with persistent disease open (cellularity described at 30% with 10-20% residual blasts on IHC). Results shared with patient, various options discussed moving forward. Seen 12/30/2019 at clinic with some persistent fatigue. Discussed various options again, counseled clearly improved: Various options discussed including intermediate dose rivera-C versus reinduction with modified FLAG (will hold off on Faythe Ewings given past anthracycline exposure), patient opted for reinduction with FLAG. She has tolerated chemo fairly well. She has received 2 rounds of vancomycin for left great ingrown toenail. She will continue Levaquin at home. She has appointments for labs/replacements as well as appointment with Dr. Bethel Pham. She knows to call with any fevers or other concerning symptoms. AML 
- admit for re-induction w modified dose FLAG 
12/31 Day 1 FLAG. 1/6 Day 7 FLAG. Tolerating treatment well. Awaiting count recovery. Start Granix. 1/11 Day 12. Awaiting count recovery. On Granix. 1/13 Day 14 FLAG. Continues granix. WBC 0.1 1/15 D 16 FLAG. Continues on granix. Counts starting to recover as WBC up to 0.7 and ANC 0.6 today. Will plan for bone marrow biopsy on Friday. 1/16 ANC 1.2. Platelets today. BMbx tomorrow. May need platelets in am prior to procedure.  
  
Ingrown toenail / Cellulitis 12/31 Was going to defer treatment pending podiatrist consult, but no podiatrist will come see pt while IP. Gave pt the option of being discharged to see podiatrist prior to proceeding with treatment vs starting treatment along with Vanc. Pt prefers to start treatment. Vanc ordered. 1/1 on Vanc. Toe covered with bandaid today when seen. 1/2  Toe appears much improved. Con't Vanc. 1/3 Infx appears resolved. Will continue Vanc through weekend and will likely DC early next week. 1/4 Infx resolved. Plan to DC Vanc after 7 days of treatment. 1/6 Con't Vanc (D6).  
1/7 Resolved. Completed Vanc. 1/11 L first toe appears more erythematous today. Pt reports bumping it several times on side table yesterday. Started using neosporin yesterday. Will add prophylactic Levaquin. 1/13 adding vanc back x 7 days 
  
Bradycardia 1/4 Asymptomatic. EKG with sinus irish. 
  
Pancytopenia secondary to chemotherapy - Transfuse prn per [facility] SOPs 
1/11 Transfuse 1 unit PRBCs. 1/15 PRBCs today  
  
  
Continue home meds Prophylactic Antibx: Acyclovir, Voriconazole, Levaquin [facility] SOPs SCDs for DVT prophylaxis (AC contraindicated d/t thrombocytopenia) 
  
 
Consults: 
IP CONSULT TO INTERVENTIONAL RADIOLOGY Pertinent Diagnostic Studies:  
Labs:   
Recent Labs  
  01/17/20 
0723 01/17/20 
0137 01/16/20 
0358 01/15/20 
3485 WBC  --  2.1* 1.5* 0.7* HGB  --  8.1* 8.4* 7.0*  
PLT 58* 29* 9* 13* ANEU  --  1.5* 1.2* 0.6* Recent Labs  
  01/17/20 
0137 01/16/20 9175 01/15/20 
3412  141 141  
K 3.8 3.9 4.0  
 109* 109* CO2 29 26 27 GLU 88 93 95 BUN 12 14 16 CREA 0.71 0.71 0.76 CA 9.4 9.5 9.7 SGOT 13* 12* 14*  99 91  
TP 6.2* 6.0* 6.0* ALB 3.1* 3.0* 3.1*  
MG 1.7* 2.0 1.8 OBJECTIVE: 
Patient Vitals for the past 8 hrs: 
 BP Temp Pulse Resp SpO2  
20 0550 124/55 98.5 °F (36.9 °C) 86 16 93 % 20 0535 135/58 98.8 °F (37.1 °C) 87 16 96 % 20 0327 132/69 98.3 °F (36.8 °C) 95 16 95 % Temp (24hrs), Av.4 °F (36.9 °C), Min:97.9 °F (36.6 °C), Max:98.8 °F (37.1 °C) No intake/output data recorded. Physical Exam: 
Constitutional: Well developed, well nourished female in no acute distress, sitting comfortably on the bedside couch HEENT: Normocephalic and atraumatic. Oropharynx is clear, mucous membranes are moist.  Pupils are equal, round, and reactive to light. Extraocular muscles are intact. Sclerae anicteric. Neck supple without JVD. No thyromegaly present. Skin Warm and dry. No bruising and no rash noted. No erythema. No pallor. Left toe improving. Respiratory Lungs are clear to auscultation bilaterally without wheezes, rales or rhonchi, normal air exchange without accessory muscle use. CVS Normal rate, regular rhythm and normal S1 and S2. No murmurs, gallops, or rubs. Abdomen Soft, nontender and nondistended, normoactive bowel sounds. No palpable mass. No hepatosplenomegaly. Neuro Grossly nonfocal with no obvious sensory or motor deficits. MSK Normal range of motion in general.  No edema and no tenderness. Psych Appropriate mood and affect. Current Discharge Medication List  
  
CONTINUE these medications which have CHANGED Details  
acyclovir (ZOVIRAX) 400 mg tablet Take 1 Tab by mouth two (2) times a day for 30 days. Qty: 60 Tab, Refills: 0 CONTINUE these medications which have NOT CHANGED Details  
levoFLOXacin (LEVAQUIN) 500 mg tablet Take  by mouth daily. lidocaine-prilocaine (EMLA) topical cream Apply  to affected area as needed for Pain. Qty: 30 g, Refills: 0  
  
potassium chloride (K-DUR, KLOR-CON) 20 mEq tablet Take 1 Tab by mouth two (2) times a day. Qty: 60 Tab, Refills: 0  
  
voriconazole (VFEND) 200 mg tablet Take 1 Tab by mouth every twelve (12) hours. Qty: 60 Tab, Refills: 0 DULoxetine (CYMBALTA) 30 mg capsule Take 1 Cap by mouth daily. Qty: 30 Cap, Refills: 3  
  
zolpidem (AMBIEN) 5 mg tablet Take 1 Tab by mouth nightly as needed for Sleep. Max Daily Amount: 5 mg. Qty: 30 Tab, Refills: 0 Associated Diagnoses: Acute myeloid leukemia not having achieved remission ([facility] [address] Insomnia, unspecified type  
  
cholecalciferol (VITAMIN D3) 400 unit tab tablet Take 2 Tabs by mouth daily. Qty: 60 Tab, Refills: 0  
  
ondansetron hcl (ZOFRAN) 8 mg tablet Take 1 Tab by mouth every eight (8) hours as needed for Nausea. Qty: 90 Tab, Refills: 0  
  
vit C/E/zinc/lutein/zeaxanthin ([address] PO) Take 1 Tab by mouth daily. LORazepam (ATIVAN) 1 mg tablet Take  by mouth nightly. acetaminophen 500 mg tab 500 mg, diphenhydrAMINE 25 mg cap 25 mg Take  by mouth nightly. pravastatin (PRAVACHOL) 10 mg tablet Take  by mouth nightly. STOP taking these medications  
  
 allopurinol (ZYLOPRIM) 300 mg tablet Comments:  
Reason for Stopping:   
   
  
 
ASSESSMENT: 
 
Active Problems: 
  Admission for antineoplastic chemotherapy (5/5/2019) DISPOSITION: 
Follow-up Appointments Procedures  FOLLOW UP VISIT Appointment in: Other (Specify) Patient has labs/replacements scheduled 1/20 and 1/23. FU with Dr. Giovanni Gupta on 1/24. Patient has labs/replacements scheduled 1/20 and 1/23. FU with Dr. Giovanni Gupta on 1/24. Standing Status:   Standing Number of Occurrences:   1 Order Specific Question:   Appointment in Answer: Other (Specify) Lexie Mack NP [facility] Hematology & Oncology [address] [facility] [address] [facility] Office : [phone] Fax : [phone] Attending Addendum: 
I have personally performed a face to face diagnostic evaluation on this patient. I have reviewed and agree with the care plan as documented above by Lilly Marcos N.P.  My findings are as follows: Patient appears lethargic, heart rate regular without murmurs, abdomen is non-tender, bowel sounds are positive. 74 female h/o port infection, AML, FLT3 ITD +ve with NPM1 and TET2 mutation, failed induction with 7+3 and Midostaurin. Subsequently on Decitabine plus Gilteritinib x 3 cycles w/ persistent disease. S/p FLAG-VENITA 11/19 w persistent disease though improvement in blast percentage as well as cytopenias.  She was admitted for reinduction with dose modified FLAG D+17.  Left toe erythema treated w IV vancomycin and improved, cultures remained -ve.   ANC improved, clinically stable for discharge.  Continue prophylactic antibiotics including Levaquin, acyclovir and voriconazole.  P.o. intake and ambulation encouraged. Out pt f/u within a week.  
  
 
I spent 32 minutes on evaluation, management, counseling and discharge planning on patient. Karlie Grier MD 
[facility] Hematology/Oncology [address] [facility] Office : [phone] Fax : [phone]

## 2020-01-17 NOTE — PROCEDURES
Department of Interventional Radiology 
[phone] Interventional Radiology Brief Procedure Note Patient: Maria Elena Horne MRN: [id number]  SSN: [id number] YOB: 1945  Age: 76 y.o. Sex: female Date of Procedure: 1/17/2020 Attending: Sachi Sharma MD  
 
Assistants: None Pre-Procedure Diagnosis: AML. Post-Procedure Diagnosis: SAME Procedure(s): Image Guided Biopsy Brief Description of Procedure: CT guided bone marrow biopsy. Anesthesia:Moderate Sedation Specimens:  Pathology Estimated Blood Loss: Less than 10ml Complications: None. Findings: Successful CT guided bone marrow biopsy. Recommendations and Follow Up: FU path. See detailed description of Findings and Procedure in Procedure Dictation in PACS. Signed By: Sachi Sharma MD   
 January 17, 2020

## 2020-01-17 NOTE — DISCHARGE INSTRUCTIONS
DISCHARGE SUMMARY from Nurse    PATIENT INSTRUCTIONS:    While taking prescription Narcotics:  · Limit your activities  · Do not drive and operate hazardous machinery  · Do not make important personal or business decisions  · Do  not drink alcoholic beverages    Please call physician after your discharge if the following symptoms present:    1. Temperature greater than 100.5  2. Heart rate greater than 90 beats per minute  3. Increased respirations   4. Chills  5. Cough with any sputum (color: yellow, white, green, or bloody?)  6. Shortness of breath or change in breathing pattern  7. Bleeding:  Any prolonged bleeding presented from skin tears, cuts, bleeding from brushing teeth, nose bleed, bleeding noted in stool  8. Tenderness, swelling, or drainage from IV site or central line catheter    Diet:Regular    Neutropenic Precautions  Thrombocytopenic Precautions    MD office #:[phone]    What to do at Home:  Recommended activity: Activity as tolerated    *  Please give a list of your current medications to your Primary Care Provider. *  Please update this list whenever your medications are discontinued, doses are      changed, or new medications (including over-the-counter products) are added. *  Please carry medication information at all times in case of emergency situations. These are general instructions for a healthy lifestyle:    No smoking/ No tobacco products/ Avoid exposure to second hand smoke  Surgeon General's Warning:  Quitting smoking now greatly reduces serious risk to your health.     Obesity, smoking, and sedentary lifestyle greatly increases your risk for illness    A healthy diet, regular physical exercise & weight monitoring are important for maintaining a healthy lifestyle    You may be retaining fluid if you have a history of heart failure or if you experience any of the following symptoms:  Weight gain of 3 pounds or more overnight or 5 pounds in a week, increased swelling in our hands or feet or shortness of breath while lying flat in bed. Please call your doctor as soon as you notice any of these symptoms; do not wait until your next office visit. The discharge information has been reviewed with the patient. The patient verbalized understanding. Discharge medications reviewed with the patient and appropriate educational materials and side effects teaching were provided.   ___________________________________________________________________________________________________________________________________

## 2020-01-17 NOTE — PROGRESS NOTES
Problem: Falls - Risk of 
Goal: *Absence of Falls Description Document Aiyana Archer Fall Risk and appropriate interventions in the flowsheet. Outcome: Progressing Towards Goal 
Note: Fall Risk Interventions: 
Mobility Interventions: Patient to call before getting OOB Mentation Interventions: Room close to nurse's station Medication Interventions: Patient to call before getting OOB, Teach patient to arise slowly Elimination Interventions: Patient to call for help with toileting needs History of Falls Interventions: Room close to nurse's station Problem: Patient Education: Go to Patient Education Activity Goal: Patient/Family Education Outcome: Progressing Towards Goal 
  
Problem: Chemotherapy, Day 3 to Discharge Goal: Activity/Safety Outcome: Progressing Towards Goal 
Goal: Consults, if ordered Outcome: Progressing Towards Goal 
Goal: Diagnostic Test/Procedures Outcome: Progressing Towards Goal 
Goal: Nutrition/Diet Outcome: Progressing Towards Goal 
Goal: Discharge Planning Outcome: Progressing Towards Goal 
Goal: Medications Outcome: Progressing Towards Goal 
Goal: Respiratory Outcome: Progressing Towards Goal 
Goal: Treatments/Interventions/Procedures Outcome: Progressing Towards Goal 
Goal: Psychosocial 
Outcome: Progressing Towards Goal 
Goal: *Optimal pain control at patient's stated goal 
Outcome: Progressing Towards Goal 
Goal: *Hemodynamically stable Outcome: Progressing Towards Goal 
Goal: *Adequate oxygenation Outcome: Progressing Towards Goal 
  
Problem: Chemotherapy Discharge Outcomes Goal: *Verbalizes understanding and describes prescribed diet Outcome: Progressing Towards Goal 
Goal: *Describes follow-up/return visits to physicians Outcome: Progressing Towards Goal 
Goal: *Describes home care/support arrangements established based on need Outcome: Progressing Towards Goal 
Goal: *Describes available resources and support systems Outcome: Progressing Towards Goal 
 Goal: *Anxiety reduced or absent Outcome: Progressing Towards Goal 
Goal: *Understands and describes signs and symptoms to report to providers(Stroke Metric) Outcome: Progressing Towards Goal 
Goal: *Hemodynamically stable Outcome: Progressing Towards Goal 
Goal: *Tolerating diet Outcome: Progressing Towards Goal 
Goal: *Verbalizes understanding and describes medication purposes and frequencies Outcome: Progressing Towards Goal 
Goal: *Venous access site with positive blood return and without signs and symptoms of infection Outcome: Progressing Towards Goal 
Goal: *Verbalizes understanding of bowel regimen Outcome: Progressing Towards Goal 
  
Problem: Nutrition Deficit Goal: *Optimize nutritional status Outcome: Progressing Towards Goal 
  
Problem: Anemia Care Plan (Adult and Pediatric) Goal: *Labs within defined limits Outcome: Progressing Towards Goal 
Goal: *Tolerates increased activity Outcome: Progressing Towards Goal 
  
Problem: Nausea/Vomiting (Adult) Goal: *Absence of nausea/vomiting Outcome: Progressing Towards Goal 
  
Problem: Patient Education: Go to Patient Education Activity Goal: Patient/Family Education Outcome: Progressing Towards Goal 
  
Problem: Pain Goal: *Control of Pain Outcome: Progressing Towards Goal 
  
Problem: Patient Education: Go to Patient Education Activity Goal: Patient/Family Education Outcome: Progressing Towards Goal 
  
Problem: Impaired Skin Integrity/Pressure Injury Treatment Goal: *Improvement of Existing Pressure Injury Outcome: Progressing Towards Goal 
Goal: *Prevention of pressure injury Description Document Ameya Scale and appropriate interventions in the flowsheet. Outcome: Progressing Towards Goal 
Note: Pressure Injury Interventions: 
Sensory Interventions: Maintain/enhance activity level Moisture Interventions: Maintain skin hydration (lotion/cream) Activity Interventions: Pressure redistribution bed/mattress(bed type) Mobility Interventions: Pressure redistribution bed/mattress (bed type) Nutrition Interventions: Document food/fluid/supplement intake, Discuss nutritional consult with provider Friction and Shear Interventions: Apply protective barrier, creams and emollients Problem: Patient Education: Go to Patient Education Activity Goal: Patient/Family Education Outcome: Progressing Towards Goal

## 2020-01-17 NOTE — PROGRESS NOTES
0712-Bedside report received from Judd Gonsales RN. Resting in bed. No needs voiced. No s/s of acute distress. 0723-Pt's plt count 58,000 after redraw and plt infusion. Pt above 50,000 for bmbx today. 1435-Discharge medications reviewed with patient and appropriate educational materials and side effects teaching were provided. I have reviewed discharge instructions with the patient. The patient verbalized understanding.

## 2020-01-17 NOTE — PROGRESS NOTES
Pt is for discharge home today with no needs/supportive care orders received for CM at this time. Pt will have close follow up at the [id number] [place] [address] Way Milestones met Care Management Interventions PCP Verified by CM: Yes Mode of Transport at Discharge: Self Transition of Care Consult (CM Consult): Discharge Planning Discharge Durable Medical Equipment: No 
Physical Therapy Consult: No 
Occupational Therapy Consult: No 
Speech Therapy Consult: No 
Current Support Network: Own Home, Family Lives [place] Confirm Follow Up Transport: Family The Plan for Transition of Care is Related to the Following Treatment Goals : no needs Discharge Location Discharge Placement: Home

## 2020-01-20 NOTE — PROGRESS NOTES
Massage THERAPY: Daily Note Referring Physician: Miky Torres MD 
Medical/Referring Diagnosis: AML (acute myel[facility]) ([facility] [C92.00] Precautions/Allergies: Patient has no known allergies. SUBJECTIVE: 
Present Symptoms: None, enjoys massage Pre-Treatment Pain: 1/10 Neuropathy Scale:  1/10 Past Medical History: Ms. Aramis Graf  has a past medical history of AML (acute myeloid leukemia) ([facility] (dx- 4/2019), Depression, Hypercholesterolemia, Infection, Psychiatric disorder, and Sleep apnea. Ms. Aramis Graf  has a past surgical history that includes ir insert tunl cvc w port over 5 years (4/30/2019); ir remove tunl cvad w port/pump (6/13/2019); hx other surgical; ir insert tunl cvc w port over 5 years (7/15/2019); and hx vascular access. Current Medications:   
  
Current Outpatient Medications:  
  levoFLOXacin (LEVAQUIN) 500 mg tablet, Take 1 Tab by mouth every twenty-four (24) hours for 10 days. , Disp: 10 Tab, Rfl: 0 
  DULoxetine (CYMBALTA) 30 mg capsule, Take 1 Cap by mouth daily. , Disp: 30 Cap, Rfl: 3 
  acyclovir (ZOVIRAX) 400 mg tablet, Take 1 Tab by mouth two (2) times a day for 30 days. , Disp: 60 Tab, Rfl: 0 
  levoFLOXacin (LEVAQUIN) 500 mg tablet, Take  by mouth daily. , Disp: , Rfl:  
  lidocaine-prilocaine (EMLA) topical cream, Apply  to affected area as needed for Pain., Disp: 30 g, Rfl: 0 
  potassium chloride (K-DUR, KLOR-CON) 20 mEq tablet, Take 1 Tab by mouth two (2) times a day., Disp: 60 Tab, Rfl: 0 
  voriconazole (VFEND) 200 mg tablet, Take 1 Tab by mouth every twelve (12) hours. , Disp: 60 Tab, Rfl: 0 
  zolpidem (AMBIEN) 5 mg tablet, Take 1 Tab by mouth nightly as needed for Sleep. Max Daily Amount: 5 mg., Disp: 30 Tab, Rfl: 0 
  cholecalciferol (VITAMIN D3) 400 unit tab tablet, Take 2 Tabs by mouth daily. , Disp: 60 Tab, Rfl: 0 
  ondansetron hcl (ZOFRAN) 8 mg tablet, Take 1 Tab by mouth every eight (8) hours as needed for Nausea., Disp: 90 Tab, Rfl: 0 
   vit C/E/zinc/lutein/zeaxanthin ([address] PO), Take 1 Tab by mouth daily. , Disp: , Rfl:  
  LORazepam (ATIVAN) 1 mg tablet, Take  by mouth nightly., Disp: , Rfl:  
  acetaminophen 500 mg tab 500 mg, diphenhydrAMINE 25 mg cap 25 mg, Take  by mouth nightly., Disp: , Rfl:  
  pravastatin (PRAVACHOL) 10 mg tablet, Take  by mouth nightly., Disp: , Rfl:  
 
Current Facility-Administered Medications:  
  magnesium sulfate 4 g/100 mL IVPB, 4 g, IntraVENous, ONCE, Rosie Montoya NP, Last Rate: 50 mL/hr at 01/20/20 0835, 4 g at 01/20/20 5289   sodium chloride (NS) flush 10-40 mL, 10-40 mL, IntraVENous, PRN, Radha Knight MD  
 
 
OBJECTIVE/ASSESSMENT: 
Observations of Patient:  No issues related to massage Response To Treatment: More relaxed Post-Treatment Pain: 1/10 Neuropathy Scale:  1/10 TREATMENT:  
 (In addition to Assessment/Re-Assessment sessions the following treatments were rendered) Treatment Provided: 
[x]  Soft tissue massage 
[]  Healing Touch Location: lower leg(s) bilaterally and feet Patient Position: Seated Time: 20 minutes PLAN OF CARE: 
 
[]  I will follow up with this patient as needed. [x]  No follow up visit necessary. Thank you for this referral. 
Arian Finney LMT

## 2020-01-20 NOTE — PROGRESS NOTES
[facility] Assessment completed. Labs reviewed. Patient tolerated IV fluids well. She also received magnesium 4 grams IV, as ordered. No further replacements needed, but instructed patient to leave green bracelet on. She verbalizes understanding. Any issues or concerns during appointment: noted magnesium level of 1.2. Call placed to [address] [place] [address], RN, and orders received. She states that she will also ensure that patient's antibiotic is sent to the [facility]'s. Informed patient of this. Patient aware of next infusion appointment on 1/25/20 (date) at 0930 (time) with IV infusion center. Discharged ambulatory, with spouse. Patient instructed to call Dr. Rosa Avila' office immediately for any problems or concerns. She verbalizes understanding.

## 2020-01-20 NOTE — PROGRESS NOTES
This note will not be viewable in [address] Transition of Care Discharge Follow-up Questionnaire Date/Time of Call: 
  01/20/2020 1:20 PM  
What was the patient hospitalized for? Antineoplastic chemotherapy Does the patient understand his/her diagnosis and/or treatment and what happened during the hospitalization? Yes, spoke with patient and she states she has an understanding of her recent hospitalization, diagnosis and treatment. Did the patient receive discharge instructions? Yes   
CM Assessed Risk for Readmission:  
  
  
Patient stated Risk for Readmission: Low risk for readmission. Review any discharge instructions (see discharge instructions/AVS in [facility]). Ask patient if they understand these. Do they have any questions? Discharge instructions reviewed with and she has an understanding. Were home services ordered (nursing, PT, OT, ST, etc.)? No   
If so, has the first visit occurred? If not, why? (Assist with coordination of services if necessary.) 
  N/A Was any DME ordered? No  
If so, has it been received? If not, why?  (Assist patient in obtaining DME orders &/or equipment if necessary.) N/A Completed review of all medications (new, discontinued, and continued meds per the d/c instructions and medication tab in [facility]) STOP taking: 
allopurinoL 300 mg tablet (ZYLOPRIM) Were all new prescriptions filled? If not, why?  (Assist patient in obtaining medications if necessary  escalate for CCM &/or SW if ongoing issues are verbalized by pt or anticipated) Yes Does the patient understand the purpose and dosing instructions for all medications? (If patient has questions, provide explanation and education.) Medications reviewed with patient and she verbalizes an understanding. Does the patient have any problems in performing ADLs? (If patient is unable to perform ADLs  what is the limiting factor(s)?   Do they have a support system that can assist? If no support system is present, discuss possible assistance that they may be able to obtain. Escalate for CCM/SW if ongoing issues are verbalized by pt or anticipated) Patient has some difficulty with ADLs. Does the patient have all follow-up appointments scheduled? 7 day f/up with PCP?  
(f/up with PCP may be w/in 14 days if patient has a f/up with their specialist w/in 7 days) 7-14 day f/up with specialist?  
(or per discharge instructions) If f/up has not been made  what actions has the care coordinator made to accomplish this? Has transportation been arranged? FU with PCP Dr. Sandra VIRGEN. FU with oncologist Dr. Tanvi Kuhn scheduled for 1/24/2020. Discussed the importance of FU appointments with patients daughter and she verbalized understanding. No transportation needs at this time. Any other questions or concerns expressed by the patient? Schedule next appointment with MARIAH LEO Coordinator or refer to RN Case Manager/ per the workflow guidelines. When is care coordinators next follow-up call scheduled? If referred for CCM  what RN care manager Was the referral assigned? 7-14 days TOBY Call Completed By: Paty Page LPN Care Coordinator

## 2020-01-20 NOTE — PROGRESS NOTES
This note will not be viewable in [address] 1st attempt to reach patient for a 30 day follow up MARIAH LEO Call. No answer, on home phone. Message left to return call. [facility] Coordinator will attempt to reach patient again within the 30 day period.

## 2020-01-24 NOTE — PROGRESS NOTES
Arrived to the [facility]. Granix completed. Patient tolerated well. Any issues or concerns during appointment: none. Patient aware of next infusion appointment on 1-25-20 (date) at 0930 (time). Discharged via ambulatory.

## 2020-01-25 NOTE — PROGRESS NOTES
Arrived to the [facility]. Left medial port accessed-labs drawn and reviewed. ANC=7.7,no Granix needed Provided education on Granix-patient has previously received this medication Patient instructed to report any side affects to ordering provider- Patient tolerated well Any issues or concerns during appointment: Hemoglobin=7.3,platelet IBBXU=88,347 Patient aware of next infusion appointment on Monday,January 27th @ 0800 Discharged home ambulatory accompanied by

## 2020-01-27 NOTE — PROGRESS NOTES
Arrived to the [facility]. Assessment completed, labs drawn/reviewed. No replacements or Granix needed. Type and Cross drawn/sent and 1unit PRBCs ordered in anticipation of need for next appt, per Navigator Buck Sorenson RN. Patient aware of next infusion appointment on 01/30/20 at 8am. 
Discharged ambulatory.

## 2020-01-30 NOTE — PROGRESS NOTES
1/30/20:  Saw patient in infusion with Alirio ROBERTS. Patient reports some fatigue and discomfort with left great toe. Patient with recent ingrown toenail that is still causing pain. Patient referred to podiatry (Dr. Omid Matta). After visit labs reviewed. Patient to receive 1 unit of PRBC today and granix as ANC is 0.4. Patient to return on 2/3 for labs and replacements and possible granix. Dr. Joyce Thompson to see the patient on 2/5 for full review of bone marrow biopsy.

## 2020-01-30 NOTE — PROGRESS NOTES
Arrived to infusion. Labs drawn via port and reviewed. Received 1 unit PRBCs for HGB 6.6  Granix given for . Patient tolerated well. Was seen by NP and navigator during infusion. Concerns addressed at that time. Next infusion appointment is 2/3/20 at [address] [place] [address].  Discharged ambulatory with spouse.

## 2020-01-30 NOTE — PROGRESS NOTES
Social Work Progress Note  Name: Perla Maxwell  : 1945  MRN: [id number]  Date of Service: 2020    Length of Service: 15 minutes    Patient Diagnosis:   1. Acute myeloid leukemia not having achieved remission ([facility] [address]        Reason for Visit: F/U    Subjective: \"The doctor said that I am on remission\" Seen patient with her  at Infusion. LISW-CP provided validation and sustainment as patient vent about emotional and physical distress. Denied any socioeconomic needs at this time. Protective Factors: Current care for physical and mental illness, adequate insight and judgment, family support, cultural and Hinduism beliefs and values that support self-care. Patient did not report suicidal ideations, intent or plans. Patient did not report homicidal ideations, intent or plans. Patient is oriented to self, place, time and situation. Next Steps: LISW-CP gave contact information and encouraged pt to call should any needs arise. Pt verbalized understanding. LISW-CP intends to follow up as needed.       3 most recent [place] [address] Screens 2019   Little interest or pleasure in doing things Not at all Not at all Not at all   Feeling down, depressed, irritable, or hopeless Not at all More than half the days More than half the days   Total Score PHQ 2 0 2 2   Trouble falling or staying asleep, or sleeping too much - - -   Feeling tired or having little energy - - -   Poor appetite, weight loss, or overeating - - -   Feeling bad about yourself - or that you are a failure or have let yourself or your family down - - -   Trouble concentrating on things such as school, work, reading, or watching TV - - -   Moving or speaking so slowly that other people could have noticed; or the opposite being so fidgety that others notice - - -   Thoughts of being better off dead, or hurting yourself in some way - - -   PHQ 9 Score - - -   How difficult have these problems made it for you to do your work, take care of your home and get along with others - - -         CARMEN Zamora

## 2020-01-30 NOTE — PROGRESS NOTES
This note will not be viewable in [address] Transitions of Care  Follow up Outreach Note Outreach type Phone call: Spoke with patients  Home visit:  
Date/Time of Outreach: 1/30/2020 1:30  PM  
  
Has patient attended PCP or specialist follow-up appointments since last contact? What was outcome of appointment? When is next follow-up scheduled? FU with PCP Dr. Shantelle VIRGEN. FU with Oncology completed on 1/24/2020. Review medications. Any medication changes since last outreach? Does patient have any questions or issues related to their medications? Medications reviewed with patients . He verbalizes an understanding of medications. Home health active? If yes  any issue? Progress? No  
  
Referrals needed? 
(CM, SW, HH, etc. ) No   
Other issues/Miscellaneous? (Transportation, access to meals, ability to perform ADLs, adequate caregiver support, etc.) Patients  states that the patient is doing great. She is able to perform ADLs, she has no pain and she is currently in remission from Leukemia. Next Outreach Scheduled? Graduation from program? 
  15-30 days Next Steps/Goals (if applicable): Outreach completed by: 
  Tiffanie Castellano LPN Care Coordinator

## 2020-02-03 NOTE — PROGRESS NOTES
Labs noted and [place] RN aware of results. Granix 300 mcg Sq given for ANC 0.8 today. Alteplase removed from lateral port 5cc blood discarded and flushed with 20cc NS. Good blood return present. Patient discharged via ambulation accompanied by . Instructed to notify physician of any problems, questions or concerns after discharge. Next appointment is 02/06/2020 at [address] [place] [address] with Infusion.

## 2020-02-05 NOTE — PROGRESS NOTES
2/5/20:  Patient in for follow-up after bone marrow biopsy. Dr. Harry Hidalgo discussed bone marrow results and discussed need to start intermediate dose rivera-c to maintain remission. Patient hesitant to be inpatient again but agreed. Dr. Harry Hidalgo also recommended consult for possible allogeneic transplant for curative intent. Patient very hesitant about going to [facility] for transplant consult. She will think about it and let this navigator know if she changes her mind and wants the referral.  Plan is for patient to be admitted on 2/10 for Rivera-c and to be discharged after completion. Patient will have weekly provider visits after discharge and labs and replacements 2-3 times week until count recovery.

## 2020-02-05 NOTE — PROGRESS NOTES
Left lateral port accessed per protocol with a 0.75 maynard needle. Flushed with normal saline 10cc. Positive blood return. Labs drawn per order. Flushed with 10cc of normal saline and  
 
 
hep locked no. Still accessed no Dressing applied yes

## 2020-02-12 NOTE — PROGRESS NOTES
02/12/20 0845 Dual Skin Pressure Injury Assessment Dual Skin Pressure Injury Assessment WDL Second Care Provider (Based on [address] [place] [address]) Kathie Ryan

## 2020-02-12 NOTE — PROGRESS NOTES
END OF SHIFT NOTE: 
 
Intake/Output 02/12 0701 - 02/12 1900 In: 240 [P.O.:240] Out: 300 [Urine:300] Voiding: YES Catheter: NO 
Drain:   
 
 
 
 
 
Stool:  0 occurrences. Stool Assessment Stool Appearance: Soft (02/12/20 0845) Emesis:  0 occurrences. VITAL SIGNS Patient Vitals for the past 12 hrs: 
 Temp Pulse Resp BP SpO2  
02/12/20 0844 98.8 °F (37.1 °C) 97 16 142/59 97 % Pain Assessment Pain 1 Pain Scale 1: Numeric (0 - 10) (02/12/20 0844) Pain Intensity 1: 0 (02/12/20 0844) Patient Stated Pain Goal: 0 (02/12/20 0844) Ambulating Yes Additional Information: Pt resting in bed with family at bedside. Hi[employer] started. No other needs at this time. Shift report given to oncoming nurse at the bedside. Alexia Black

## 2020-02-12 NOTE — PROGRESS NOTES
Problem: Pain Goal: *Control of Pain Outcome: Progressing Towards Goal 
Goal: *PALLIATIVE CARE:  Alleviation of Pain Outcome: Progressing Towards Goal 
  
Problem: Falls - Risk of 
Goal: *Absence of Falls Description Document Casey Gordon Fall Risk and appropriate interventions in the flowsheet. Outcome: Progressing Towards Goal 
Note: Fall Risk Interventions: 
Mobility Interventions: Communicate number of staff needed for ambulation/transfer, Patient to call before getting OOB Mentation Interventions: Family/sitter at bedside Medication Interventions: Patient to call before getting OOB, Evaluate medications/consider consulting pharmacy Elimination Interventions: Call light in reach, Patient to call for help with toileting needs History of Falls Interventions: Room close to nurse's station

## 2020-02-12 NOTE — H&P
[facility] Hematology & Oncology Inpatient Hematology / Oncology History and Physical 
 
Reason for Admission:  Admission for antineoplastic chemotherapy [Z51.11] History of Present Illness: Ms. Fredi Walsh is a 76 y.o. female admitted on 2/12/2020 with a primary diagnosis of AML. She failed induction with  7+3/Midostaurin, Decitabine/Gilteritinib x 3, and FLAG-VENITA in 11/2019. She was most recently treated with FLAG in 12/2019. Recent BMbx with CR. She is being admitted for consolidation with intermediate dose Arabella-Ca at 1.5 mg/m² every 12 hours on days 1 3 and 5 . She is feeling well and is ready to proceed with treatment. Review of Systems: 
Constitutional Denies fever, chills, weight loss, appetite changes, fatigue, night sweats. HEENT Denies trauma, blurry vision, hearing loss, ear pain, nosebleeds, sore throat, neck pain and ear discharge. Skin Denies lesions or rashes. Lungs Denies dyspnea, cough, sputum production or hemoptysis. Cardiovascular Denies chest pain, palpitations, or lower extremity edema. Gastrointestinal Denies nausea, vomiting, changes in bowel habits, bloody or black stools, abdominal pain.  Denies dysuria, frequency or hesitancy of urination. Neuro Denies headaches, visual changes or ataxia. Denies dizziness, tingling, tremors, sensory change, speech change, focal weakness or headaches. Hematology Denies easy bruising or bleeding, denies gingival bleeding or epistaxis. Endo Denies heat/cold intolerance, denies diabetes or thyroid abnormalities. MSK Denies back pain, arthralgias, myalgias or frequent falls. Psychiatric/Behavioral Denies depression and substance abuse. The patient is not nervous/anxious. No Known Allergies Past Medical History:  
Diagnosis Date  AML (acute myeloid leukemia) ([facility] [address] dx- 4/2019  
 followed by dr Umer Barrett  Depression  Hypercholesterolemia  Infection of port -- was placed 5/2019-- removed 6/2019---right chest  
 Psychiatric disorder   
 aniexty  Sleep apnea Past Surgical History:  
Procedure Laterality Date  HX OTHER SURGICAL    
 colonoscopy  HX VASCULAR ACCESS    
 IR INSERT TUNL CVC W PORT OVER 5 YEARS  4/30/2019  IR INSERT TUNL CVC W PORT OVER 5 YEARS  7/15/2019  IR REMOVE TUNL CVAD W PORT/PUMP  6/13/2019 Family History Problem Relation Age of Onset  Cancer Father Social History Socioeconomic History  Marital status:  Spouse name: Not on file  Number of children: Not on file  Years of education: Not on file  Highest education level: Not on file Occupational History  Not on file Social Needs  Financial resource strain: Not on file  Food insecurity:  
  Worry: Not on file Inability: Not on file  Transportation needs:  
  Medical: Not on file Non-medical: Not on file Tobacco Use  Smoking status: Never Smoker  Smokeless tobacco: Never Used Substance and Sexual Activity  Alcohol use: Never Frequency: Never  Drug use: Never  Sexual activity: Not on file Lifestyle  Physical activity:  
  Days per week: Not on file Minutes per session: Not on file  Stress: Not on file Relationships  Social connections:  
  Talks on phone: Not on file Gets together: Not on file Attends Sikhism service: Not on file Active member of club or organization: Not on file Attends meetings of clubs or organizations: Not on file Relationship status: Not on file  Intimate partner violence:  
  Fear of current or ex partner: Not on file Emotionally abused: Not on file Physically abused: Not on file Forced sexual activity: Not on file Other Topics Concern [address] Service Not Asked  Blood Transfusions Not Asked  Caffeine Concern Not Asked  Occupational Exposure Not Asked Re[employer] Hazards Not Asked  Sleep Concern Not Asked  Stress Concern Not Asked  Weight Concern Not Asked  Special Diet Not Asked  Back Care Not Asked  Exercise Not Asked  Bike Helmet Not Asked  Seat Belt Not Asked  Self-Exams Not Asked Social History Narrative  Not on file Current Facility-Administered Medications Medication Dose Route Frequency Provider Last Rate Last Dose  acyclovir (ZOVIRAX) tablet 400 mg  400 mg Oral BID Alena Renee NP      
 cholecalciferol (VITAMIN D3) (400 Units /10 mcg) tablet 2 Tab  800 Units Oral DAILY Alena Renee NP      
 DULoxetine (CYMBALTA) capsule 30 mg  30 mg Oral DAILY Alena Renee, ARMANDO      
 levoFLOXacin [facility]) tablet 500 mg  500 mg Oral DAILY Alena Renee NP      
 lidocaine-prilocaine (EMLA) 2.5-2.5 % cream   Topical PRN Alena Renee NP      
 LORazepam (ATIVAN) tablet 1 mg  1 mg Oral QHS Alena Renee, ARMANDO      
 potassium chloride (K-DUR, KLOR-CON) SR tablet 20 mEq  20 mEq Oral DAILY Alena Renee NP      
 pravastatin (PRAVACHOL) tablet 10 mg  10 mg Oral QHS Alena Renee, ARMANDO      
 voriconazole (VFEND) tablet 200 mg  200 mg Oral Q12H Alena Renee, ARMANDO      
 zolpidem (AMBIEN) tablet 10 mg  10 mg Oral QHS PRN Alena Renee NP      
 0.9% sodium chloride infusion  75 mL/hr IntraVENous CONTINUOUS Alena Renee NP      
 enoxaparin (LOVENOX) injection 40 mg  40 mg SubCUTAneous Q24H Alena Renee NP      
 [facility]ron [facility]) injection 4 mg  4 mg IntraVENous Q4H PRN Alena Renee NP      
 prochlorperazine (COMPAZINE) with saline injection 5 mg  5 mg IntraVENous Q6H PRN Alena Renee NP      
 HYDROcodone-acetaminophen (NORCO) 5-325 mg per tablet 1 Tab  1 Tab Oral Q6H PRN Alena Renee NP      
 morphine injection 2 mg  2 mg IntraVENous Q4H PRN Alena Renee NP      
[address] [place] [address] ON 2/13/2020] vit C,C-Ju-cudzg-lutein-zeaxan (PRESERVISION AREDS-2) capsule 1 Cap (Patient Supplied)  1 Cap Oral DAILY Cornell Rinne, MD      
  prednisoLONE acetate (PRED FORTE) 1 % ophthalmic suspension 2 Drop  2 Drop Both Eyes Q6H Susie Ibrahim MD      
 [facility]) injection 8 mg  8 mg IntraVENous Q12H Susie Ibrahim MD      
 dexamethasone (DECADRON) 4 mg/mL injection 4 mg  4 mg IntraVENous Q12H Susie Ibrahim MD      
 cytarabine (CYTOSAR) 2,895 mg in 0.9% sodium chloride 250 mL chemo infusion  1.5 g/m2 (Treatment Plan Recorded) IntraVENous Q12H MD Randi Okeefe [START ON 2/14/2020] ondansetron [facility]) injection 8 mg  8 mg IntraVENous Q12H Susie Ibrahim MD      
 [START ON 2/14/2020] dexamethasone (DECADRON) 4 mg/mL injection 4 mg  4 mg IntraVENous Q12H MD Randi Okeefe [START ON 2/14/2020] cytarabine (CYTOSAR) 2,895 mg in 0.9% sodium chloride 250 mL chemo infusion  1.5 g/m2 (Treatment Plan Recorded) IntraVENous Q12H MD Randi Okeefe [START ON 2/16/2020] ondansetron [facility]) injection 8 mg  8 mg IntraVENous Q12H MD Sanjay [facility]keefena [facility] 2/16/2020] dexamethasone (DECADRON) 4 mg/mL injection 4 mg  4 mg IntraVENous Q12H Susie Ibrahim MD [employer] [START ON 2/16/2020] cytarabine (CYTOSAR) 2,895 mg in 0.9% sodium chloride 250 mL chemo infusion  1.5 g/m2 (Treatment Plan Recorded) IntraVENous Q12H Susie Ibrahim MD      
 sodium chloride 0.9 % bolus infusion 500 mL  500 mL IntraVENous PRN Susie Ibrahim MD      
 diphenhydrAMINE (BENADRYL) injection 25 mg  25 mg IntraVENous PRADALBERTO Ibrahim MD      
 famotidine (PF) (PEPCID) 20 mg in 0.9% sodium chloride 10 mL injection  20 mg IntraVENous PRADALBERTO Ibrahim MD      
 acetaminophen (TYLENOL) tablet 650 mg  650 mg Oral PRN Susie Ibrahim MD      
 meperidine (DEMEROL) injection 25 mg  25 mg IntraVENous PRADALBERTO Ibrahim MD      
 [facility]) injection 8 mg  8 mg IntraVENous PRN Susie Ibrahim MD      
 sodium chloride 0.9 % bolus infusion 500 mL  500 mL IntraVENous PROMISE Ibrahim MD      
  EPINEPHrine HCl (PF) (ADRENALIN) 1 mg/mL (1 mL) injection 0.3 mg  0.3 mg SubCUTAneous PRN Daron Dolan MD      
 hydrocortisone Sod Succ (PF) (SOLU-CORTEF) injection 100 mg  100 mg IntraVENous PRN Daron Dolan MD      
 diphenhydrAMINE (BENADRYL) injection 50 mg  50 mg IntraVENous PRN Daron Dolan MD      
 albuterol (PROVENTIL VENTOLIN) nebulizer solution 2.5 mg  2.5 mg Inhalation PRN Daron Dolan MD      
 
 
OBJECTIVE: 
Patient Vitals for the past 8 hrs: 
 BP Temp Pulse Resp SpO2 Weight  
20 1208      (P) 178 lb 2.1 oz (80.8 kg) 20 0844 142/59 98.8 °F (37.1 °C) 97 16 97 %  Temp (24hrs), Av.8 °F (37.1 °C), Min:98.8 °F (37.1 °C), Max:98.8 °F (37.1 °C) No intake/output data recorded. Physical Exam: 
Constitutional: Well developed, well nourished female in no acute distress, sitting comfortably in the hospital bed.  at bedside. HEENT: Normocephalic and atraumatic. Oropharynx is clear, mucous membranes are moist.  Extraocular muscles are intact. Sclerae anicteric. Neck supple without JVD. No thyromegaly present. Skin Warm and dry. No bruising and no rash noted. No erythema. No pallor. Respiratory Lungs are clear to auscultation bilaterally without wheezes, rales or rhonchi, normal air exchange without accessory muscle use. CVS Normal rate, regular rhythm and normal S1 and S2. No murmurs, gallops, or rubs. Abdomen Soft, nontender and nondistended, normoactive bowel sounds. No palpable mass. No hepatosplenomegaly. Neuro Grossly nonfocal with no obvious sensory or motor deficits. MSK Normal range of motion in general.  No edema and no tenderness. Psych Appropriate mood and affect. Labs:   
Recent Results (from the past 24 hour(s)) METABOLIC PANEL, COMPREHENSIVE Collection Time: 20  9:10 AM  
Result Value Ref Range Sodium 142 136 - 145 mmol/L Potassium 4.0 3.5 - 5.1 mmol/L  Chloride 109 (H) 98 - 107 mmol/L  
 CO2 26 21 - 32 mmol/L Anion gap 7 7 - 16 mmol/L Glucose 112 (H) 65 - 100 mg/dL BUN 16 8 - 23 MG/DL Creatinine 0.90 0.6 - 1.0 MG/DL  
 GFR est AA >60 >60 ml/min/1.73m2 GFR est non-AA >60 >60 ml/min/1.73m2 Calcium 9.3 8.3 - 10.4 MG/DL Bilirubin, total 0.2 0.2 - 1.1 MG/DL  
 ALT (SGPT) 16 12 - 65 U/L  
 AST (SGOT) 18 15 - 37 U/L Alk. phosphatase 108 50 - 136 U/L Protein, total 6.4 6.3 - 8.2 g/dL Albumin 3.5 3.2 - 4.6 g/dL Globulin 2.9 2.3 - 3.5 g/dL A-G Ratio 1.2 1.2 - 3.5 MAGNESIUM Collection Time: 02/12/20  9:10 AM  
Result Value Ref Range Magnesium 2.0 1.8 - 2.4 mg/dL CBC WITH AUTOMATED DIFF Collection Time: 02/12/20  9:10 AM  
Result Value Ref Range WBC 3.4 (L) 4.3 - 11.1 K/uL  
 RBC 2.67 (L) 4.05 - 5.2 M/uL HGB 9.1 (L) 11.7 - 15.4 g/dL HCT 28.0 (L) 35.8 - 46.3 % .9 (H) 79.6 - 97.8 FL  
 MCH 34.1 (H) 26.1 - 32.9 PG  
 MCHC 32.5 31.4 - 35.0 g/dL RDW 22.8 (H) 11.9 - 14.6 % PLATELET 545 (L) 139 - 450 K/uL MPV 10.3 9.4 - 12.3 FL ABSOLUTE NRBC 0.00 0.0 - 0.2 K/uL  
 DF AUTOMATED NEUTROPHILS 29 (L) 43 - 78 % LYMPHOCYTES 11 (L) 13 - 44 % MONOCYTES 55 (H) 4.0 - 12.0 % EOSINOPHILS 4 0.5 - 7.8 % BASOPHILS 1 0.0 - 2.0 % IMMATURE GRANULOCYTES 1 0.0 - 5.0 %  
 ABS. NEUTROPHILS 1.0 (L) 1.7 - 8.2 K/UL  
 ABS. LYMPHOCYTES 0.4 (L) 0.5 - 4.6 K/UL  
 ABS. MONOCYTES 1.9 (H) 0.1 - 1.3 K/UL  
 ABS. EOSINOPHILS 0.1 0.0 - 0.8 K/UL  
 ABS. BASOPHILS 0.0 0.0 - 0.2 K/UL  
 ABS. IMM. GRANS. 0.0 0.0 - 0.5 K/UL Imaging: 
n/a ASSESSMENT: 
Problem List  Date Reviewed: 1/31/2020 Codes Class Noted Febrile neutropenia (HCC) ICD-10-CM: D70.9, R50.81 ICD-9-CM: 288.00, 780.61  9/21/2019 Pancytopenia due to antineoplastic chemotherapy [facility]) ICD-10-CM: D61.810, T45.1X5A 
ICD-9-CM: 284.11, E933.1  6/12/2019 Cellulitis of neck ICD-10-CM: Q77.541 ICD-9-CM: 682.1  6/12/2019 Immunocompromised status associated with infection ICD-10-CM: B99.9 ICD-9-CM: 136.9  6/12/2019 Port or reservoir infection ICD-10-CM: B61.260C ICD-9-CM: 999.33  6/12/2019 Acute myeloid leukemia not having achieved remission ([facility] ICD-10-CM: C92.00 ICD-9-CM: 205.00  5/9/2019 Admission for antineoplastic chemotherapy ICD-10-CM: Z51.11 ICD-9-CM: V58.11  5/5/2019 AML (acute myeloblastic leukemia) ([facility] ICD-10-CM: C92.00 ICD-9-CM: 205.00  4/28/2019 Weakness generalized ICD-10-CM: R53.1 ICD-9-CM: 780.79  4/28/2019 Pancytopenia ([facility] ICD-10-CM: H02.124 ICD-9-CM: 284.19  4/28/2019 Thrombocytopenia ([facility] ICD-10-CM: D69.6 ICD-9-CM: 287.5  4/27/2019 PLAN: 
AML 
- admit for consolidation with intermediate dose Arabella-C Pancytopenia secondary to disease/chemotherapy - Transfuse prn per [facility] SOPs Continue home meds Prophylactic Antibx: Acyclovir, Vori, LVQ [facility] SOPs Lovenox for DVT prophylaxis (hold for plt <50k) Disposition:  Anticipate discharge after the completion of chemotherapy. Per Dr. Paolo Dukes, she will need follow-up with provider within week of discharge and labs 3 times a week with replacements as needed Jasvir Cheng NP [facility] Hematology & Oncology [id number] [address] Office : [phone] Fax : [phone]

## 2020-02-13 NOTE — PROGRESS NOTES
END OF SHIFT NOTE: 
 
Intake/Output 02/12 1901 - 02/13 0700 In: 2127 [P.O.:480; I.V.:1647] Out: 2000 [YXWUT:3790] Voiding: YES Catheter: NO 
Drain:   
 
 
 
 
 
Stool:  0 occurrences. Stool Assessment Stool Appearance: Soft (02/12/20 0845) Emesis:  0 occurrences. VITAL SIGNS Patient Vitals for the past 12 hrs: 
 Temp Pulse Resp BP SpO2  
02/13/20 0234 98.3 °F (36.8 °C) 73 16 120/54 95 % 02/12/20 2237 99.1 °F (37.3 °C) 85 16 112/54 95 % 02/12/20 1957 97.7 °F (36.5 °C) 89 16 135/57 96 % Pain Assessment Pain 1 Pain Scale 1: Numeric (0 - 10) (02/13/20 0215) Pain Intensity 1: 0 (02/13/20 0215) Patient Stated Pain Goal: 0 (02/13/20 0215) Ambulating Yes Additional Information: Tolerated Dose 2 of 6 HIDAC w/ no untoward reactions. No new neuro deficits noted/reported. Shift report given to oncoming nurse at the bedside.  
 
Gloria Lyle RN

## 2020-02-13 NOTE — PROGRESS NOTES
END OF SHIFT NOTE: 
 
Intake/Output 02/13 0701 - 02/13 1900 In: 9869 [P.O.:1090; I.V.:248] Out: 1350 [[facility]:2011] Voiding: YES Catheter: NO 
Drain:   
 
 
 
 
 
Stool:  1 occurrences. Stool Assessment Stool Appearance: Soft(per patient) (02/13/20 5668) Emesis:  0 occurrences. VITAL SIGNS Patient Vitals for the past 12 hrs: 
 Temp Pulse Resp BP SpO2  
02/13/20 1548 98.3 °F (36.8 °C) 80 17 131/65 95 % 02/13/20 1219 98 °F (36.7 °C) 86 18 146/69 98 % 02/13/20 1020 98.4 °F (36.9 °C)      
02/13/20 0830 99.5 °F (37.5 °C) 98 18 135/57 96 % Pain Assessment Pain 1 Pain Scale 1: Numeric (0 - 10) (02/13/20 1430) Pain Intensity 1: 0 (02/13/20 1430) Patient Stated Pain Goal: 0 (02/13/20 1430) Ambulating Yes Additional Information:  
-Patient had a great day, eating well and ambulating  
- at bedside Shift report given to oncoming nurse at the bedside.  
 
Violeta Rodriguez, RN

## 2020-02-13 NOTE — PROGRESS NOTES
Problem: Falls - Risk of 
Goal: *Absence of Falls Description Document Shantal Murray Fall Risk and appropriate interventions in the flowsheet. Outcome: Progressing Towards Goal 
Note: Fall Risk Interventions: 
Mobility Interventions: Strengthening exercises (ROM-active/passive) Mentation Interventions: Adequate sleep, hydration, pain control, Increase mobility, Room close to nurse's station, Update white board, Toileting rounds Medication Interventions: Teach patient to arise slowly Elimination Interventions: Call light in reach, Elevated toilet seat, Patient to call for help with toileting needs, Toilet paper/wipes in reach, Toileting schedule/hourly rounds History of Falls Interventions: Room close to nurse's station

## 2020-02-13 NOTE — PROGRESS NOTES
[employer] [facility] On[facility] Inpatient Hematology / Oncology Daily Progress Note Reason for Admission:  Admission for antineoplastic chemotherapy [Z51.11] 24 Hour Events: 
Afebrile, VSS Day 2 intermediate dose Arabella-C Doing well, no complaints  at bedside ROS: 
Constitutional: Negative for fever, chills, weakness, malaise, fatigue. CV: Negative for chest pain, palpitations, edema. Respiratory: Negative for dyspnea, cough, wheezing. GI: Negative for nausea, abdominal pain, diarrhea. 10 point review of systems is otherwise negative with the exception of the elements mentioned above in the HPI. No Known Allergies Past Medical History:  
Diagnosis Date  AML (acute myeloid leukemia) ([facility] [address] dx- 4/2019  
 followed by dr Umang Emanuel  Depression  Hypercholesterolemia  Infection   
 of port -- was placed 5/2019-- removed 6/2019---right chest  
 Psychiatric disorder   
 aniexty  Sleep apnea Past Surgical History:  
Procedure Laterality Date  HX OTHER SURGICAL    
 colonoscopy  HX VASCULAR ACCESS    
 IR INSERT TUNL CVC W PORT OVER 5 YEARS  4/30/2019  IR INSERT TUNL CVC W PORT OVER 5 YEARS  7/15/2019  IR REMOVE TUNL CVAD W PORT/PUMP  6/13/2019 Family History Problem Relation Age of Onset  Cancer Father Social History Socioeconomic History  Marital status:  Spouse name: Not on file  Number of children: Not on file  Years of education: Not on file  Highest education level: Not on file Occupational History  Not on file Social Needs  Financial resource strain: Not on file  Food insecurity:  
  Worry: Not on file Inability: Not on file  Transportation needs:  
  Medical: Not on file Non-medical: Not on file Tobacco Use  Smoking status: Never Smoker  Smokeless tobacco: Never Used Substance and Sexual Activity  Alcohol use: Never Frequency: Never  Drug use: Never  Sexual activity: Not on file Lifestyle  Physical activity:  
  Days per week: Not on file Minutes per session: Not on file  Stress: Not on file Relationships  Social connections:  
  Talks on phone: Not on file Gets together: Not on file Attends Tenri[employer] service: Not on file Active member of club or organization: Not on file Attends meetings of clubs or organizations: Not on file Relationship status: Not on file  Intimate partner violence:  
  Fear of current or ex partner: Not on file Emotionally abused: Not on file Physically abused: Not on file Forced sexual activity: Not on file Other Topics Concern [address] Service Not Asked  Blood Transfusions Not Asked  Caffeine Concern Not Asked  Occupational Exposure Not Asked La[employer] Hazards Not Asked  Sleep Concern Not Asked  Stress Concern Not Asked  Weight Concern Not Asked  Special Diet Not Asked  Back Care Not Asked  Exercise Not Asked  Bike Helmet Not Asked  Seat Belt Not Asked  Self-Exams Not Asked Social History Narrative  Not on file Current Facility-Administered Medications Medication Dose Route Frequency Provider Last Rate Last Dose  acyclovir (ZOVIRAX) tablet 400 mg  400 mg Oral BID Morgan Gamino NP   400 mg at 02/13/20 0161  cholecalciferol (VITAMIN D3) (400 Units /10 mcg) tablet 2 Tab  800 Units Oral DAILY Morgan Gamino NP   2 Tab at 02/13/20 0178  DULoxetine (CYMBALTA) capsule 30 mg  30 mg Oral DAILY Morgan Gamino NP   30 mg at 02/13/20 9716  levoFLOXacin (LEVAQUIN) tablet 500 mg  500 mg Oral DAILY Morgan Gamino NP   500 mg at 02/13/20 6732  lidocaine-prilocaine (EMLA) 2.5-2.5 % cream   Topical PRN Morgan Gamino NP      
 LORazepam (ATIVAN) tablet 1 mg  1 mg Oral QHS Morgan Gamino NP   1 mg at 02/12/20 2200  potassium chloride (K-DUR, KLOR-CON) SR tablet 20 mEq  20 mEq Oral DAILY Morgan Gamino NP   20 mEq at 02/13/20 6092  pravastatin (PRAVACHOL) tablet 10 mg  10 mg Oral QHS Gabriela El NP   10 mg at 02/12/20 2200  voriconazole (VFEND) tablet 200 mg  200 mg Oral Q12H Gabriela El NP   200 mg at 02/13/20 0856  
 zolpidem (AMBIEN) tablet 10 mg  10 mg Oral QHS PRN Gabriela El NP   10 mg at 02/12/20 2200  
 0.9% sodium chloride infusion  75 mL/hr IntraVENous CONTINUOUS Gabriela El NP 75 mL/hr at 02/13/20 0237 75 mL/hr at 02/13/20 4297  enoxaparin (LOVENOX) injection 40 mg  40 mg SubCUTAneous Q24H Gabriela El NP      
 ondansetron [facility]) injection 4 mg  4 mg IntraVENous Q4H PRN Gabriela El NP      
 prochlorperazine (COMPAZINE) with saline injection 5 mg  5 mg IntraVENous Q6H PRN Gabriela El NP      
 HYDROcodone-acetaminophen (NORCO) 5-325 mg per tablet 1 Tab  1 Tab Oral Q6H PRN Gabriela El NP      
 morphine injection 2 mg  2 mg IntraVENous Q4H PRN Gabriela El NP      
 vit C,C-Gt-duxzk-lutein-zeaxan (PRESERVISION AREDS-2) capsule 1 Cap (Patient Supplied)  1 Cap Oral DAILY Siri Sandhoff, MD   1 Cap at 02/13/20 0236  prednisoLONE acetate (PRED FORTE) 1 % ophthalmic suspension 2 Drop  2 Drop Both Eyes Q6H Siri Sandhoff, MD   2 Drop at 02/13/20 6910  [START ON 2/14/2020] ondansetron (ZOFRAN) injection 8 mg  8 mg IntraVENous Q12H Siri Sandhoff, MD      
 [START ON 2/14/2020] dexamethasone (DECADRON) 4 mg/mL injection 4 mg  4 mg IntraVENous Q12H Siri Sandhoff, MD      
 [START ON 2/14/2020] cytarabine (CYTOSAR) 2,895 mg in 0.9% sodium chloride 250 mL chemo infusion  1.5 g/m2 (Treatment Plan Recorded) IntraVENous Q12H Siri Sandhoff, MD      
[facility] [START ON 2/16/2020] ondansetron [facility]) injection 8 mg  8 mg IntraVENous Q12H Siri Sandhoff, MD      
 [START ON 2/16/2020] dexamethasone (DECADRON) 4 mg/mL injection 4 mg  4 mg IntraVENous Q12H Siri Sandhoff, MD      
 [START ON 2/16/2020] cytarabine (CYTOSAR) 2,895 mg in 0.9% sodium chloride 250 mL chemo infusion  1.5 g/m2 (Treatment Plan Recorded) IntraVENous Q12H Jamal Allan MD      
 central line flush (saline) syringe 10 mL  10 mL InterCATHeter PRN Jamal Allan MD   10 mL at 20 2019  
 heparin (porcine) pf 300 Units  300 Units InterCATHeter PRN Jamal Allan MD      
 
 
OBJECTIVE: 
Patient Vitals for the past 8 hrs: 
 BP Pulse Resp SpO2  
20 0830 135/57 98 18 96 % Temp (24hrs), Av.4 °F (36.9 °C), Min:97.7 °F (36.5 °C), Max:99.1 °F (37.3 °C) 
 
 07 -  1900 In: 26 [P.O.:610; I.V.:248] Out: 300 [Urine:300] Physical Exam: 
Constitutional: Well developed, well nourished female in no acute distress, sitting comfortably in the hospital bed. HEENT: Normocephalic and atraumatic. Oropharynx is clear, mucous membranes are moist.  Extraocular muscles are intact. Sclerae anicteric. Neck supple without JVD. No thyromegaly present. Skin Warm and dry. No bruising and no rash noted. No erythema. No pallor. Respiratory Lungs are clear to auscultation bilaterally without wheezes, rales or rhonchi, normal air exchange without accessory muscle use. CVS Normal rate, regular rhythm and normal S1 and S2. No murmurs, gallops, or rubs. Abdomen Soft, nontender and nondistended, normoactive bowel sounds. No palpable mass. No hepatosplenomegaly. Neuro Grossly nonfocal with no obvious sensory or motor deficits. MSK Normal range of motion in general.  No edema and no tenderness. Psych Appropriate mood and affect. Labs:   
Recent Results (from the past 24 hour(s)) METABOLIC PANEL, COMPREHENSIVE Collection Time: 20  2:30 AM  
Result Value Ref Range Sodium 143 136 - 145 mmol/L Potassium 4.1 3.5 - 5.1 mmol/L Chloride 109 (H) 98 - 107 mmol/L  
 CO2 26 21 - 32 mmol/L Anion gap 8 7 - 16 mmol/L Glucose 145 (H) 65 - 100 mg/dL BUN 15 8 - 23 MG/DL Creatinine 0.63 0.6 - 1.0 MG/DL  
 GFR est AA >60 >60 ml/min/1.73m2 GFR est non-AA >60 >60 ml/min/1.73m2  Calcium 9.0 8.3 - 10.4 MG/DL  
 Bilirubin, total 0.3 0.2 - 1.1 MG/DL  
 ALT (SGPT) 15 12 - 65 U/L  
 AST (SGOT) 14 (L) 15 - 37 U/L Alk. phosphatase 100 50 - 136 U/L Protein, total 5.9 (L) 6.3 - 8.2 g/dL Albumin 3.1 (L) 3.2 - 4.6 g/dL Globulin 2.8 2.3 - 3.5 g/dL A-G Ratio 1.1 (L) 1.2 - 3.5 MAGNESIUM Collection Time: 02/13/20  2:30 AM  
Result Value Ref Range Magnesium 2.0 1.8 - 2.4 mg/dL CBC WITH AUTOMATED DIFF Collection Time: 02/13/20  2:30 AM  
Result Value Ref Range WBC 1.4 (LL) 4.3 - 11.1 K/uL  
 RBC 2.48 (L) 4.05 - 5.2 M/uL HGB 8.5 (L) 11.7 - 15.4 g/dL HCT 25.8 (L) 35.8 - 46.3 % .0 (H) 79.6 - 97.8 FL  
 MCH 34.3 (H) 26.1 - 32.9 PG  
 MCHC 32.9 31.4 - 35.0 g/dL RDW 22.1 (H) 11.9 - 14.6 % PLATELET 574 (L) 297 - 450 K/uL MPV 10.2 9.4 - 12.3 FL ABSOLUTE NRBC 0.00 0.0 - 0.2 K/uL  
 DF AUTOMATED NEUTROPHILS 75 43 - 78 % LYMPHOCYTES 15 13 - 44 % MONOCYTES 9 4.0 - 12.0 % EOSINOPHILS 0 (L) 0.5 - 7.8 % BASOPHILS 0 0.0 - 2.0 % IMMATURE GRANULOCYTES 1 0.0 - 5.0 %  
 ABS. NEUTROPHILS 1.0 (L) 1.7 - 8.2 K/UL  
 ABS. LYMPHOCYTES 0.2 (L) 0.5 - 4.6 K/UL  
 ABS. MONOCYTES 0.1 0.1 - 1.3 K/UL  
 ABS. EOSINOPHILS 0.0 0.0 - 0.8 K/UL  
 ABS. BASOPHILS 0.0 0.0 - 0.2 K/UL  
 ABS. IMM. GRANS. 0.0 0.0 - 0.5 K/UL Imaging: 
n/a ASSESSMENT: 
Problem List  Date Reviewed: 1/31/2020 Codes Class Noted Febrile neutropenia (HCC) ICD-10-CM: D70.9, R50.81 ICD-9-CM: 288.00, 780.61  9/21/2019 Pancytopenia due to antineoplastic chemotherapy [facility]) ICD-10-CM: D61.810, T45.1X5A 
ICD-9-CM: 284.11, E933.1  6/12/2019 Cellulitis of neck ICD-10-CM: L47.303 ICD-9-CM: 682.1  6/12/2019 Immunocompromised status associated with infection ICD-10-CM: B99.9 ICD-9-CM: 136.9  6/12/2019 Port or reservoir infection ICD-10-CM: Q84.257X ICD-9-CM: 999.33  6/12/2019  Acute myeloid leukemia not having achieved remission ([facility] [address] ICD-10-CM: C92.00 
 ICD-9-CM: 205.00  5/9/2019 Admission for antineoplastic chemotherapy ICD-10-CM: Z51.11 ICD-9-CM: V58.11  5/5/2019 AML (acute myeloblastic leukemia) ([facility] ICD-10-CM: C92.00 ICD-9-CM: 205.00  4/28/2019 Weakness generalized ICD-10-CM: R53.1 ICD-9-CM: 780.79  4/28/2019 Pancytopenia ([facility] ICD-10-CM: W25.347 ICD-9-CM: 284.19  4/28/2019 Thrombocytopenia ([facility] ICD-10-CM: D69.6 ICD-9-CM: 287.5  4/27/2019 Ms. Lesly Terry is a 76 y.o. female admitted on 2/12/2020 with a primary diagnosis of AML. She failed induction with  7+3/Midostaurin, Decitabine/Gilteritinib x 3, and FLAG-VENITA in 11/2019. She was most recently treated with FLAG in 12/2019. Recent BMbx with CR. She is being admitted for consolidation with intermediate dose Arabella-C at 1.5 mg/m² every 12 hours on days 1 3 and 5 . She is feeling well and is ready to proceed with treatment. PLAN: 
AML 
- admit for consolidation with intermediate dose Arabella-C 
2/13 Day 2 intermediate dose Arabella-C. Tolerating treatment well. Pt really would like to attend a [employer] dinner at her [employer] Sunday evening. Dr. Yaquelin Castanon discussed with pharmacy about changing timing of her Arabella-C to accommodate her plans to attend this function. Pancytopenia secondary to disease/chemotherapy - Transfuse prn per [facility] SOPs Continue home meds Prophylactic Antibx: Acyclovir, Vori, LVQ [facility] SOPs Lovenox for DVT prophylaxis (hold for plt <50k) Disposition:  Anticipate discharge after the completion of chemotherapy on Sun, 2/16. Per Dr. Tanvi Kuhn, she will need follow-up with provider within week of discharge and labs 3 times a week with replacements as needed Simmie Fleischer, NP [facility] Hematology & Oncology [address] [address] Office : [phone] Fax : [phone]

## 2020-02-13 NOTE — PROGRESS NOTES
Admission for antineoplastic chemotherapy Chart screened by  for discharge planning. No needs identified at this time. Please consult  if any new issues arise Case management will continue to follow. Care Management Interventions PCP Verified by CM: Yes Mode of Transport at Discharge: Self Transition of Care Consult (CM Consult): Discharge Planning Discharge Durable Medical Equipment: No 
Physical Therapy Consult: No 
Occupational Therapy Consult: No 
Current Support Network: Own Home, Family Lives [place], Lives with Spouse Confirm Follow Up Transport: Family Discharge Location Discharge Placement: Home

## 2020-02-14 NOTE — PROGRESS NOTES
's late entry progress note for visit on 2.13.20 Visited with Mrs. Sebastien Weller and  Mary Jo Shi, conveying care and concern and offering prayer as requested. Merline Cough, MDiv Board Certified [employer]

## 2020-02-14 NOTE — DISCHARGE SUMMARY
[facility] Hematology & Oncology: Inpatient Hematology / Oncology Discharge Summary Note Patient ID: Samuel Torres [id number] 
80 y.o. 
1945 Admit Date: 2/12/2020 Discharge Date: 2/16/2020 Admission Diagnoses: Admission for antineoplastic chemotherapy [Z51.11] Discharge Diagnoses: 
Principal Diagnosis: <principal problem not specified> Active Problems: 
  Admission for antineoplastic chemotherapy (5/5/2019) Hospital Course: Ms. Fredi Walsh is a 76 y.o. female admitted on 2/12/2020 with a primary diagnosis of AML. She failed induction with  7+3/Midostaurin, Decitabine/Gilteritinib x 3, and FLAG-VENITA in 11/2019. She was most recently treated with FLAG in 12/2019. Recent BMbx with CR. She is being admitted for consolidation with intermediate dose Arabella-Ca at 1.5 mg/m² every 12 hours on days 1 3 and 5 . She tolerated treatment well. Her only complaint was some mild numbness in her toes. She is feeling well and is ready for discharge home. She is scheduled for 3x week labs and f/u with provider on 2/27. Advised to call with fever, chills, uncontrollable symptoms, or with any other concerns. Consults: 
None Pertinent Diagnostic Studies:  
Labs:   
Recent Labs  
  02/16/20 
0461 02/15/20 
0310 02/14/20 
0410 WBC 1.0* 1.2* 2.5* HGB 8.6* 8.1* 8.4* * 114* 112* ANEU 0.8* 1.1* 1.6* Recent Labs  
  02/16/20 
2229 02/15/20 
0310 02/14/20 
0410  142 143  
K 4.4 3.9 4.0  
 109* 109* CO2 28 27 28 * 142* 127* BUN 18 19 16 CREA 0.60 0.59* 0.65 CA 9.4 9.1 9.3 SGOT 12* 12* 12* AP 89 86 92  
TP 6.0* 5.9* 5.9* ALB 3.3 3.3 3.1*  
MG 2.1 2.0 2.2 Imaging: 
n/a Current Discharge Medication List  
  
START taking these medications Details  
prednisoLONE acetate (PRED FORTE) 1 % ophthalmic suspension Administer 2 Drops to both eyes every six (6) hours. Until 2/22/20 Qty: 5 mL, Refills: 0 Associated Diagnoses: Acute myeloid leukemia not having achieved remission ([facility] CONTINUE these medications which have CHANGED Details  
acyclovir (ZOVIRAX) 400 mg tablet Take 1 Tab by mouth two (2) times a day for 30 days. Qty: 60 Tab, Refills: 0 CONTINUE these medications which have NOT CHANGED Details  
potassium chloride (K-DUR, KLOR-CON) 20 mEq tablet Take 1 Tab by mouth daily. Qty: 30 Tab, Refills: 3  
  
voriconazole (VFEND) 200 mg tablet Take 1 Tab by mouth every twelve (12) hours. Qty: 60 Tab, Refills: 3  
  
levoFLOXacin (LEVAQUIN) 500 mg tablet Take 1 Tab by mouth daily for 30 days. Qty: 30 Tab, Refills: 3  
  
zolpidem (AMBIEN) 10 mg tablet Take 1 Tab by mouth nightly as needed for Sleep. Max Daily Amount: 10 mg. 
Qty: 30 Tab, Refills: 0 Associated Diagnoses: Acute myeloid leukemia not having achieved remission ([facility] Insomnia, unspecified type DULoxetine (CYMBALTA) 30 mg capsule Take 1 Cap by mouth daily. Qty: 30 Cap, Refills: 3  
  
lidocaine-prilocaine (EMLA) topical cream Apply  to affected area as needed for Pain. Qty: 30 g, Refills: 0  
  
cholecalciferol (VITAMIN D3) 400 unit tab tablet Take 2 Tabs by mouth daily. Qty: 60 Tab, Refills: 0  
  
ondansetron hcl (ZOFRAN) 8 mg tablet Take 1 Tab by mouth every eight (8) hours as needed for Nausea. Qty: 90 Tab, Refills: 0  
  
vit C/E/zinc/lutein/zeaxanthin ([address] PO) Take 1 Tab by mouth daily. LORazepam (ATIVAN) 1 mg tablet Take  by mouth nightly. acetaminophen 500 mg tab 500 mg, diphenhydrAMINE 25 mg cap 25 mg Take  by mouth nightly. pravastatin (PRAVACHOL) 10 mg tablet Take  by mouth nightly. mupirocin (BACTROBAN) 2 % ointment LYNDA EXT TO THE SKIN BID OBJECTIVE: 
Patient Vitals for the past 8 hrs: 
 BP Temp Pulse Resp SpO2  
20 0824 135/65 97.7 °F (36.5 °C) (!) 102 16 98 % Temp (24hrs), Av °F (36.7 °C), Min:97.2 °F (36.2 °C), Max:98.5 °F (36.9 °C) 02/16 0701 - 02/16 1900 In: 240 [P.O.:240] Out: - Physical Exam: 
Constitutional: Well developed, well nourished female in no acute distress, sitting comfortably in the bedside chair. HEENT: Normocephalic and atraumatic. Oropharynx is clear, mucous membranes are moist. Extraocular muscles are intact. Sclerae anicteric. Neck supple without JVD. No thyromegaly present. Skin Warm and dry. No bruising and no rash noted. No erythema. No pallor. Respiratory Lungs are clear to auscultation bilaterally without wheezes, rales or rhonchi, normal air exchange without accessory muscle use. CVS Normal rate, regular rhythm and normal S1 and S2. No murmurs, gallops, or rubs. Abdomen Soft, nontender and nondistended, normoactive bowel sounds. No palpable mass. No hepatosplenomegaly. Neuro Grossly nonfocal with no obvious sensory or motor deficits. MSK Normal range of motion in general.  No edema and no tenderness. Psych Appropriate mood and affect. ASSESSMENT: 
 
Active Problems: 
  Admission for antineoplastic chemotherapy (5/5/2019) DISPOSITION: 
Follow-up Appointments Procedures  FOLLOW UP VISIT Appointment in: Other (Specify) Follow up as previously schedule 3 times weekly and f/u with provider on 2/27 Follow up as previously schedule 3 times weekly and f/u with provider on 2/27 Standing Status:   Standing Number of Occurrences:   1 Order Specific Question:   Appointment in Answer: Other (Specify) Over 30 minutes was spent in discharge planning and coordination of care. Hosea Matta NP [facility] Hematology & Oncology [id number] [place] [address] Office : [phone] Fax : [phone]

## 2020-02-14 NOTE — PROGRESS NOTES
[employer] [facility] Inpatient Hematology / Oncology Daily Progress Note Reason for Admission:  Admission for antineoplastic chemotherapy [Z51.11] 24 Hour Events: 
Afebrile, VSS Day 3 intermediate dose Arabella-C Tolerating treatment well C/o numbness in her toes  at bedside ROS: 
Constitutional: Negative for fever, chills, weakness, malaise, fatigue. CV: Negative for chest pain, palpitations, edema. Respiratory: Negative for dyspnea, cough, wheezing. GI: Negative for nausea, abdominal pain, diarrhea. 10 point review of systems is otherwise negative with the exception of the elements mentioned above in the HPI. No Known Allergies Past Medical History:  
Diagnosis Date  AML (acute myeloid leukemia) ([facility] [address] dx- 4/2019  
 followed by dr Mcneill Filter  Depression  Hypercholesterolemia  Infection   
 of port -- was placed 5/2019-- removed 6/2019---right chest  
 Psychiatric disorder   
 aniexty  Sleep apnea Past Surgical History:  
Procedure Laterality Date  HX OTHER SURGICAL    
 colonoscopy  HX VASCULAR ACCESS    
 IR INSERT TUNL CVC W PORT OVER 5 YEARS  4/30/2019  IR INSERT TUNL CVC W PORT OVER 5 YEARS  7/15/2019  IR REMOVE TUNL CVAD W PORT/PUMP  6/13/2019 Family History Problem Relation Age of Onset  Cancer Father Social History Socioeconomic History  Marital status:  Spouse name: Not on file  Number of children: Not on file  Years of education: Not on file  Highest education level: Not on file Occupational History  Not on file Social Needs  Financial resource strain: Not on file  Food insecurity:  
  Worry: Not on file Inability: Not on file  Transportation needs:  
  Medical: Not on file Non-medical: Not on file Tobacco Use  Smoking status: Never Smoker  Smokeless tobacco: Never Used Substance and Sexual Activity  Alcohol use: Never Frequency: Never  Drug use: Never  Sexual activity: Not on file Lifestyle  Physical activity:  
  Days per week: Not on file Minutes per session: Not on file  Stress: Not on file Relationships  Social connections:  
  Talks on phone: Not on file Gets together: Not on file Attends Taoist service: Not on file Active member of club or organization: Not on file Attends meetings of clubs or organizations: Not on file Relationship status: Not on file  Intimate partner violence:  
  Fear of current or ex partner: Not on file Emotionally abused: Not on file Physically abused: Not on file Forced sexual activity: Not on file Other Topics Concern [address] Service Not Asked  Blood Transfusions Not Asked  Caffeine Concern Not Asked  Occupational Exposure Not Asked Deann Eglin Hazards Not Asked  Sleep Concern Not Asked  Stress Concern Not Asked  Weight Concern Not Asked  Special Diet Not Asked  Back Care Not Asked  Exercise Not Asked  Bike Helmet Not Asked  Seat Belt Not Asked  Self-Exams Not Asked Social History Narrative  Not on file Current Facility-Administered Medications Medication Dose Route Frequency Provider Last Rate Last Dose  acyclovir (ZOVIRAX) tablet 400 mg  400 mg Oral BID Juani Barbosa NP   400 mg at 02/14/20 5969  cholecalciferol (VITAMIN D3) (400 Units /10 mcg) tablet 2 Tab  800 Units Oral DAILY Juani Barbosa, NP   2 Tab at 02/14/20 2983  DULoxetine (CYMBALTA) capsule 30 mg  30 mg Oral DAILY Juani Barbosa NP   30 mg at 02/14/20 4594  levoFLOXacin (LEVAQUIN) tablet 500 mg  500 mg Oral DAILY Juani Barbosa, NP   500 mg at 02/14/20 2840  lidocaine-prilocaine (EMLA) 2.5-2.5 % cream   Topical PRN Juani Barbosa NP      
 LORazepam (ATIVAN) tablet 1 mg  1 mg Oral QHS Juani Barbosa NP   1 mg at 02/13/20 2219  potassium chloride (K-DUR, KLOR-CON) SR tablet 20 mEq  20 mEq Oral DAILY Omayra Jones NP   20 mEq at 02/14/20 4942  pravastatin (PRAVACHOL) tablet 10 mg  10 mg Oral QHS Omayra Jones NP   10 mg at 02/13/20 2219  voriconazole (VFEND) tablet 200 mg  200 mg Oral Q12H Omayra Jones NP   200 mg at 02/14/20 3859  zolpidem (AMBIEN) tablet 10 mg  10 mg Oral QHS PRN Omayra Jones NP   10 mg at 02/13/20 2218  
 0.9% sodium chloride infusion  75 mL/hr IntraVENous CONTINUOUS Omayra Jones NP 75 mL/hr at 02/13/20 2225 75 mL/hr at 02/13/20 2225  enoxaparin (LOVENOX) injection 40 mg  40 mg SubCUTAneous Q24H Omayra Jones NP      
 ondansetron [facility]) injection 4 mg  4 mg IntraVENous Q4H PRN Omayra Jones NP      
 prochlorperazine (COMPAZINE) with saline injection 5 mg  5 mg IntraVENous Q6H PRN Omayra Jones NP      
 HYDROcodone-acetaminophen (NORCO) 5-325 mg per tablet 1 Tab  1 Tab Oral Q6H PRN Omayra Jones NP      
 morphine injection 2 mg  2 mg IntraVENous Q4H PRN Omayra Jones NP      
 vit C,P-Jm-cniym-lutein-zeaxan (PRESERVISION AREDS-2) capsule 1 Cap (Patient Supplied)  1 Cap Oral DAILY Jitendra Sheets MD   1 Cap at 02/14/20 8021  prednisoLONE acetate (PRED FORTE) 1 % ophthalmic suspension 2 Drop  2 Drop Both Eyes Q6H Jitendra Sheets MD   2 Drop at 02/14/20 0410  
 ondansetron (ZOFRAN) injection 8 mg  8 mg IntraVENous BID Jitendra Sheets MD      
 dexamethasone (DECADRON) 4 mg/mL injection 4 mg  4 mg IntraVENous BID Jitendra Sheets MD      
 cytarabine (CYTOSAR) 2,895 mg in 0.9% sodium chloride 250 mL chemo infusion  1.5 g/m2 (Treatment Plan Recorded) IntraVENous BID MD Pedro Pablo Eden Terressa Fragmin ON 2/16/2020] [facility] injection 8 mg  8 mg IntraVENous BID MD Pedro Pablo Eden Terressa Fragmin ON 2/16/2020] dexamethasone (DECADRON) 4 mg/mL injection 4 mg  4 mg IntraVENous BID MD Pedro Pablo Eden [START ON 2/16/2020] cytarabine (CYTOSAR) 2,895 mg in 0.9% sodium chloride 250 mL chemo infusion  1.5 g/m2 (Treatment Plan Recorded) IntraVENous BID Jitendra Sheets MD      
  central line flush (saline) syringe 10 mL  10 mL InterCATHeter PROMISE Ibrahim MD   10 mL at 20 2019  
 heparin (porcine) pf 300 Units  300 Units InterCATHeter PROMISE Ibrahim MD      
 
 
OBJECTIVE: 
Patient Vitals for the past 8 hrs: 
 BP Temp Pulse Resp SpO2  
20 0753 130/56 97.6 °F (36.4 °C) 74 20 98 % 20 0355 148/62 98.3 °F (36.8 °C) 68 18 95 % Temp (24hrs), Av.2 °F (36.8 °C), Min:97.6 °F (36.4 °C), Max:98.7 °F (37.1 °C) 
 
 07 -  1900 In: 521 [P.O.:360; I.V.:161] Out: 700 [Urine:700] Physical Exam: 
Constitutional: Well developed, well nourished female in no acute distress, sitting comfortably in the hospital bed. HEENT: Normocephalic and atraumatic. Oropharynx is clear, mucous membranes are moist.  Extraocular muscles are intact. Sclerae anicteric. Neck supple without JVD. No thyromegaly present. Skin Warm and dry. No bruising and no rash noted. No erythema. No pallor. Respiratory Lungs are clear to auscultation bilaterally without wheezes, rales or rhonchi, normal air exchange without accessory muscle use. CVS Normal rate, regular rhythm and normal S1 and S2. No murmurs, gallops, or rubs. Abdomen Soft, nontender and nondistended, normoactive bowel sounds. No palpable mass. No hepatosplenomegaly. Neuro Grossly nonfocal with no obvious sensory or motor deficits. MSK Normal range of motion in general.  No edema and no tenderness. Psych Appropriate mood and affect. Labs:   
Recent Results (from the past 24 hour(s)) METABOLIC PANEL, COMPREHENSIVE Collection Time: 20  4:10 AM  
Result Value Ref Range Sodium 143 136 - 145 mmol/L Potassium 4.0 3.5 - 5.1 mmol/L Chloride 109 (H) 98 - 107 mmol/L  
 CO2 28 21 - 32 mmol/L Anion gap 6 (L) 7 - 16 mmol/L Glucose 127 (H) 65 - 100 mg/dL BUN 16 8 - 23 MG/DL Creatinine 0.65 0.6 - 1.0 MG/DL  
 GFR est AA >60 >60 ml/min/1.73m2 GFR est non-AA >60 >60 ml/min/1.73m2 Calcium 9.3 8.3 - 10.4 MG/DL Bilirubin, total 0.4 0.2 - 1.1 MG/DL  
 ALT (SGPT) 14 12 - 65 U/L  
 AST (SGOT) 12 (L) 15 - 37 U/L Alk. phosphatase 92 50 - 136 U/L Protein, total 5.9 (L) 6.3 - 8.2 g/dL Albumin 3.1 (L) 3.2 - 4.6 g/dL Globulin 2.8 2.3 - 3.5 g/dL A-G Ratio 1.1 (L) 1.2 - 3.5 MAGNESIUM Collection Time: 02/14/20  4:10 AM  
Result Value Ref Range Magnesium 2.2 1.8 - 2.4 mg/dL CBC WITH AUTOMATED DIFF Collection Time: 02/14/20  4:10 AM  
Result Value Ref Range WBC 2.5 (L) 4.3 - 11.1 K/uL  
 RBC 2.44 (L) 4.05 - 5.2 M/uL HGB 8.4 (L) 11.7 - 15.4 g/dL HCT 25.4 (L) 35.8 - 46.3 % .1 (H) 79.6 - 97.8 FL  
 MCH 34.4 (H) 26.1 - 32.9 PG  
 MCHC 33.1 31.4 - 35.0 g/dL RDW 21.8 (H) 11.9 - 14.6 % PLATELET 936 (L) 421 - 450 K/uL MPV 10.2 9.4 - 12.3 FL ABSOLUTE NRBC 0.00 0.0 - 0.2 K/uL  
 DF AUTOMATED NEUTROPHILS 64 43 - 78 % LYMPHOCYTES 3 (L) 13 - 44 % MONOCYTES 32 (H) 4.0 - 12.0 % EOSINOPHILS 0 (L) 0.5 - 7.8 % BASOPHILS 0 0.0 - 2.0 % IMMATURE GRANULOCYTES 1 0.0 - 5.0 %  
 ABS. NEUTROPHILS 1.6 (L) 1.7 - 8.2 K/UL  
 ABS. LYMPHOCYTES 0.1 (L) 0.5 - 4.6 K/UL  
 ABS. MONOCYTES 0.8 0.1 - 1.3 K/UL  
 ABS. EOSINOPHILS 0.0 0.0 - 0.8 K/UL  
 ABS. BASOPHILS 0.0 0.0 - 0.2 K/UL  
 ABS. IMM. GRANS. 0.0 0.0 - 0.5 K/UL Imaging: 
n/a ASSESSMENT: 
Problem List  Date Reviewed: 1/31/2020 Codes Class Noted Febrile neutropenia (HCC) ICD-10-CM: D70.9, R50.81 ICD-9-CM: 288.00, 780.61  9/21/2019 Pancytopenia due to antineoplastic chemotherapy [facility]) ICD-10-CM: D61.810, T45.1X5A 
ICD-9-CM: 284.11, E933.1  6/12/2019 Cellulitis of neck ICD-10-CM: Z14.559 ICD-9-CM: 682.1  6/12/2019 Immunocompromised status associated with infection ICD-10-CM: B99.9 ICD-9-CM: 136.9  6/12/2019 Port or reservoir infection ICD-10-CM: P60.084B ICD-9-CM: 999.33  6/12/2019 Acute myeloid leukemia not having achieved remission ([facility] ICD-10-CM: C92.00 ICD-9-CM: 205.00  5/9/2019 Admission for antineoplastic chemotherapy ICD-10-CM: Z51.11 ICD-9-CM: V58.11  5/5/2019 AML (acute myeloblastic leukemia) ([facility] ICD-10-CM: C92.00 ICD-9-CM: 205.00  4/28/2019 Weakness generalized ICD-10-CM: R53.1 ICD-9-CM: 780.79  4/28/2019 Pancytopenia ([facility] ICD-10-CM: W31.448 ICD-9-CM: 284.19  4/28/2019 Thrombocytopenia ([facility] ICD-10-CM: D69.6 ICD-9-CM: 287.5  4/27/2019 Ms. Shirin Ambriz is a 76 y.o. female admitted on 2/12/2020 with a primary diagnosis of AML. She failed induction with  7+3/Midostaurin, Decitabine/Gilteritinib x 3, and FLAG-VENITA in 11/2019. She was most recently treated with FLAG in 12/2019. Recent BMbx with CR. She is being admitted for consolidation with intermediate dose Arabella-C at 1.5 mg/m² every 12 hours on days 1 3 and 5 . She is feeling well and is ready to proceed with treatment. PLAN: 
AML 
- admit for consolidation with intermediate dose Arabella-C 
2/13 Day 2 intermediate dose Arabella-C. Tolerating treatment well. Pt really would like to attend a Greats's Day dinner at her [employer] Sunday evening. Dr. Smita Silva discussed with pharmacy about changing timing of her Arabella-C to accommodate her plans to attend this function. 2/14 Day 3. Tolerating treatment well. C/o numbness in her toes Pancytopenia secondary to disease/chemotherapy - Transfuse prn per Alexander SOPs Continue home meds Prophylactic Antibx: Acyclovir, Vori, LVQ [facility] SOPs Lovenox for DVT prophylaxis (hold for plt <50k) Disposition:  Anticipate discharge after the completion of chemotherapy on Sun, 2/16. Per Dr. Sarah Perez, she will need follow-up with provider within week of discharge and labs 3 times a week with replacements as needed Reyes Speaker, NP [facility] On[facility] [address] [address] Office : [phone] Fax : [phone]

## 2020-02-14 NOTE — PROGRESS NOTES
Problem: Pain Goal: *Control of Pain Outcome: Progressing Towards Goal 
  
Problem: Falls - Risk of 
Goal: *Absence of Falls Description Document Jorge A  Fall Risk and appropriate interventions in the flowsheet. Outcome: Progressing Towards Goal 
Note: Fall Risk Interventions: 
Mobility Interventions: Communicate number of staff needed for ambulation/transfer Mentation Interventions: Adequate sleep, hydration, pain control, Room close to nurse's station Medication Interventions: Teach patient to arise slowly Elimination Interventions: Patient to call for help with toileting needs, Call light in reach History of Falls Interventions: Room close to nurse's station

## 2020-02-14 NOTE — PROGRESS NOTES
Follow-up visit with Mrs. Han Adams and her  to convey care and concern. Laymond Brittle, MDiv Board Certified [employer]

## 2020-02-15 NOTE — PROGRESS NOTES
0700-Bedside report received from Luis Eduardo Thrasher RN. Resting in bed. No needs voiced. No s/s of acute distress. 1805-END OF SHIFT NOTE: 
Pt's VSS and is in no acute distress. Pt receiving high dose Arabella-C. Pt tolerating chemo well. Intake/Output 02/15 0701 - 02/15 1900 In: 1469.6 [P.O.:840; I.V.:629.6] Out: 950 [Urine:950] Voiding: YES Catheter: NO 
Drain:   
 
 
Stool:  0 occurrences. Stool Assessment Stool Appearance: Soft(per patient) (02/13/20 1711) Emesis:  0 occurrences. VITAL SIGNS Patient Vitals for the past 12 hrs: 
 Temp Pulse Resp BP SpO2  
02/15/20 1502 97.2 °F (36.2 °C) 63 18 141/69 99 % 02/15/20 1122 98.3 °F (36.8 °C) 73 18 140/62 97 % 02/15/20 0743 97.5 °F (36.4 °C) 75 16 129/58 97 % Pain Assessment Pain 1 Pain Scale 1: Numeric (0 - 10) (02/15/20 0743) Pain Intensity 1: 0 (02/15/20 0743) Patient Stated Pain Goal: 0 (02/14/20 2025) Pain Reassessment 1: Yes (02/14/20 2125) Pain Onset 1: now (02/14/20 2025) Pain Location 1: Neck (02/14/20 2025) Pain Orientation 1: Left (02/14/20 2025) Pain Description 1: Aching; Sore (02/14/20 2025) Pain Intervention(s) 1: Medication (see MAR) (02/14/20 2025) Ambulating Yes Andres Rai 1850-Bedside shift change report given to Wendie Lomeli RN (oncoming nurse) by Madhu Chneg RN (offgoing nurse). Report included the following information SBAR, Kardex, Intake/Output, MAR and Recent Results.

## 2020-02-15 NOTE — PROGRESS NOTES
2132 cath mango instilled into port due to no blood return. 2202 still no blood return from port. 2235 still no blood return from port. 2333 positive blood return from port. Aspirated blood return and flushed with normal saline. 2340 cytarabine started to infuse thru port with positive blood return noted. To infuse over 3 hours. Neuro assessment was normal.  0240 cytarabine infused with out problem. Positive blood return noted from port.

## 2020-02-15 NOTE — PROGRESS NOTES
Problem: Pain Goal: *Control of Pain Outcome: Progressing Towards Goal 
Goal: *PALLIATIVE CARE:  Alleviation of Pain Outcome: Progressing Towards Goal 
  
Problem: Falls - Risk of 
Goal: *Absence of Falls Description Document Tim Garza Fall Risk and appropriate interventions in the flowsheet. Outcome: Progressing Towards Goal 
Note: Fall Risk Interventions: 
Mobility Interventions: Communicate number of staff needed for ambulation/transfer Mentation Interventions: Room close to nurse's station Medication Interventions: Teach patient to arise slowly Elimination Interventions: Patient to call for help with toileting needs, Call light in reach History of Falls Interventions: Room close to nurse's station Problem: Chemotherapy, Day 1 Goal: Off Pathway (Use only if patient is Off Pathway) Outcome: Progressing Towards Goal 
Goal: Activity/Safety Outcome: Progressing Towards Goal 
Goal: Consults, if ordered Outcome: Progressing Towards Goal 
Goal: Diagnostic Test/Procedures Outcome: Progressing Towards Goal 
Goal: Nutrition/Diet Outcome: Progressing Towards Goal 
Goal: Discharge Planning Outcome: Progressing Towards Goal 
Goal: Medications Outcome: Progressing Towards Goal 
Goal: Respiratory Outcome: Progressing Towards Goal 
Goal: Treatments/Interventions/Procedures Outcome: Progressing Towards Goal 
Goal: Psychosocial 
Outcome: Progressing Towards Goal 
Goal: *Optimal pain control at patient's stated goal 
Outcome: Progressing Towards Goal 
Goal: *Hemodynamically stable Outcome: Progressing Towards Goal 
Goal: *Adequate oxygenation Outcome: Progressing Towards Goal 
Goal: *Chemotherapy regimen is initiated Outcome: Progressing Towards Goal 
Goal: *Patient and family verbalize understanding of plan of care Outcome: Progressing Towards Goal

## 2020-02-15 NOTE — PROGRESS NOTES
[facility] Hematology & Oncology Inpatient Hematology / Oncology Daily Progress Note Reason for Admission:  Admission for antineoplastic chemotherapy [Z51.11] 24 Hour Events: 
Afebrile, VSS Day 4 intermediate dose Arabella-C Tolerating treatment well, but tired today Counts dropping C/o numbness in her toes  at bedside ROS: 
Constitutional: Negative for fever, chills, weakness, malaise, fatigue. CV: Negative for chest pain, palpitations, edema. Respiratory: Negative for dyspnea, cough, wheezing. GI: Negative for nausea, abdominal pain, diarrhea. 10 point review of systems is otherwise negative with the exception of the elements mentioned above in the HPI. No Known Allergies Past Medical History:  
Diagnosis Date  AML (acute myeloid leukemia) ([facility] [address] dx- 4/2019  
 followed by dr Titi Neff  Depression  Hypercholesterolemia  Infection   
 of port -- was placed 5/2019-- removed 6/2019---right chest  
 Psychiatric disorder   
 aniexty  Sleep apnea Past Surgical History:  
Procedure Laterality Date  HX OTHER SURGICAL    
 colonoscopy  HX VASCULAR ACCESS    
 IR INSERT TUNL CVC W PORT OVER 5 YEARS  4/30/2019  IR INSERT TUNL CVC W PORT OVER 5 YEARS  7/15/2019  IR REMOVE TUNL CVAD W PORT/PUMP  6/13/2019 Family History Problem Relation Age of Onset  Cancer Father Social History Socioeconomic History  Marital status:  Spouse name: Not on file  Number of children: Not on file  Years of education: Not on file  Highest education level: Not on file Occupational History  Not on file Social Needs  Financial resource strain: Not on file  Food insecurity:  
  Worry: Not on file Inability: Not on file  Transportation needs:  
  Medical: Not on file Non-medical: Not on file Tobacco Use  Smoking status: Never Smoker  Smokeless tobacco: Never Used Substance and Sexual Activity  Alcohol use: Never Frequency: Never  Drug use: Never  Sexual activity: Not on file Lifestyle  Physical activity:  
  Days per week: Not on file Minutes per session: Not on file  Stress: Not on file Relationships  Social connections:  
  Talks on phone: Not on file Gets together: Not on file Attends Islam service: Not on file Active member of club or organization: Not on file Attends meetings of clubs or organizations: Not on file Relationship status: Not on file  Intimate partner violence:  
  Fear of current or ex partner: Not on file Emotionally abused: Not on file Physically abused: Not on file Forced sexual activity: Not on file Other Topics Concern [address] Service Not Asked  Blood Transfusions Not Asked  Caffeine Concern Not Asked  Occupational Exposure Not Asked Judene Pill Hazards Not Asked  Sleep Concern Not Asked  Stress Concern Not Asked  Weight Concern Not Asked  Special Diet Not Asked  Back Care Not Asked  Exercise Not Asked  Bike Helmet Not Asked  Seat Belt Not Asked  Self-Exams Not Asked Social History Narrative  Not on file Current Facility-Administered Medications Medication Dose Route Frequency Provider Last Rate Last Dose  polyethylene glycol (MIRALAX) packet 17 g  17 g Oral DAILY Theresa Cabello MD   17 g at 02/15/20 3175  acyclovir (ZOVIRAX) tablet 400 mg  400 mg Oral BID Burnetta Goltz, NP   400 mg at 02/15/20 3202  cholecalciferol (VITAMIN D3) (400 Units /10 mcg) tablet 2 Tab  800 Units Oral DAILY Burnetta Goltz, NP   2 Tab at 02/15/20 9998  DULoxetine (CYMBALTA) capsule 30 mg  30 mg Oral DAILY Burnetta Goltz, NP   30 mg at 02/15/20 1766  levoFLOXacin (LEVAQUIN) tablet 500 mg  500 mg Oral DAILY Burnetta Goltz, NP   500 mg at 02/15/20 9644  lidocaine-prilocaine (EMLA) 2.5-2.5 % cream   Topical PRN Burnetta Goltz, NP      
  LORazepam (ATIVAN) tablet 1 mg  1 mg Oral QHS Alena Thelma, NP   1 mg at 02/14/20 2205  potassium chloride (K-DUR, KLOR-CON) SR tablet 20 mEq  20 mEq Oral DAILY Alena Thelma, NP   20 mEq at 02/15/20 0746  
 pravastatin (PRAVACHOL) tablet 10 mg  10 mg Oral QHS Alena Loboia, NP   10 mg at 02/14/20 2206  voriconazole (VFEND) tablet 200 mg  200 mg Oral Q12H Alena Renee, NP   200 mg at 02/15/20 0747  
 zolpidem (AMBIEN) tablet 10 mg  10 mg Oral QHS PRN Alena Thelma, NP   10 mg at 02/14/20 2205  
 0.9% sodium chloride infusion  75 mL/hr IntraVENous CONTINUOUS Alena Renee NP 75 mL/hr at 02/14/20 1951 75 mL/hr at 02/14/20 1951  enoxaparin (LOVENOX) injection 40 mg  40 mg SubCUTAneous Q24H Alena Renee NP      
 ondansetron [facility]) injection 4 mg  4 mg IntraVENous Q4H PRN Alena Renee NP      
 prochlorperazine (COMPAZINE) with saline injection 5 mg  5 mg IntraVENous Q6H PRN Alena Renee NP      
 HYDROcodone-acetaminophen [facility]) 5-325 mg per tablet 1 Tab  1 Tab Oral Q6H PRN Alena Renee, NP   1 Tab at 02/14/20 2025  morphine injection 2 mg  2 mg IntraVENous Q4H PRN Alena Renee, NP      
 vit C,G-Ps-twwsm-lutein-zeaxan (PRESERVISION AREDS-2) capsule 1 Cap (Patient Supplied)  1 Cap Oral DAILY Cornell Rinne, MD   1 Cap at 02/15/20 1892  prednisoLONE acetate (PRED FORTE) 1 % ophthalmic suspension 2 Drop  2 Drop Both Eyes Q6H Cornell Rinne, MD   2 Drop at 02/15/20 1100  [START ON 2/16/2020] ondansetron (ZOFRAN) injection 8 mg  8 mg IntraVENous BID Cornell Rinne, MD Verlinda Smoker [START ON 2/16/2020] dexamethasone (DECADRON) 4 mg/mL injection 4 mg  4 mg IntraVENous BID Cornell Rinne, MD Verlinda Smoker [START ON 2/16/2020] cytarabine (CYTOSAR) 2,895 mg in 0.9% sodium chloride 250 mL chemo infusion  1.5 g/m2 (Treatment Plan Recorded) IntraVENous BID Cornell Rinne, MD      
 central line flush (saline) syringe 10 mL  10 mL InterCATHeter PRN Cornell Rinne, MD   10 mL at 02/12/20 2019  heparin (porcine) pf 300 Units  300 Units InterCATHeter PROMISE Ibrahim MD      
 
 
OBJECTIVE: 
Patient Vitals for the past 8 hrs: 
 BP Temp Pulse Resp SpO2  
02/15/20 1122 140/62 98.3 °F (36.8 °C) 73 18 97 % 02/15/20 0743 129/58 97.5 °F (36.4 °C) 75 16 97 % Temp (24hrs), Av °F (36.7 °C), Min:97.4 °F (36.3 °C), Max:98.5 °F (36.9 °C) 
 
02/15 07 - 02/15 1900 In: 360 [P.O.:360] Out: 300 [Urine:300] Physical Exam: 
Constitutional: Well developed, well nourished female in no acute distress, sitting comfortably in the hospital bed. HEENT: Normocephalic and atraumatic. Oropharynx is clear, mucous membranes are moist.  Extraocular muscles are intact. Sclerae anicteric. Neck supple without JVD. No thyromegaly present. Skin Warm and dry. No bruising and no rash noted. No erythema. No pallor. Respiratory Lungs are clear to auscultation bilaterally without wheezes, rales or rhonchi, normal air exchange without accessory muscle use. CVS Normal rate, regular rhythm and normal S1 and S2. No murmurs, gallops, or rubs. Abdomen Soft, nontender and nondistended, normoactive bowel sounds. No palpable mass. No hepatosplenomegaly. Neuro Grossly nonfocal with no obvious sensory or motor deficits. MSK Normal range of motion in general.  No edema and no tenderness. Psych Appropriate mood and affect. Labs:   
Recent Results (from the past 24 hour(s)) METABOLIC PANEL, COMPREHENSIVE Collection Time: 02/15/20  3:10 AM  
Result Value Ref Range Sodium 142 136 - 145 mmol/L Potassium 3.9 3.5 - 5.1 mmol/L Chloride 109 (H) 98 - 107 mmol/L  
 CO2 27 21 - 32 mmol/L Anion gap 6 (L) 7 - 16 mmol/L Glucose 142 (H) 65 - 100 mg/dL BUN 19 8 - 23 MG/DL Creatinine 0.59 (L) 0.6 - 1.0 MG/DL  
 GFR est AA >60 >60 ml/min/1.73m2 GFR est non-AA >60 >60 ml/min/1.73m2 Calcium 9.1 8.3 - 10.4 MG/DL  Bilirubin, total 0.5 0.2 - 1.1 MG/DL  
 ALT (SGPT) 15 12 - 65 U/L  
 AST (SGOT) 12 (L) 15 - 37 U/L Alk. phosphatase 86 50 - 136 U/L Protein, total 5.9 (L) 6.3 - 8.2 g/dL Albumin 3.3 3.2 - 4.6 g/dL Globulin 2.6 2.3 - 3.5 g/dL A-G Ratio 1.3 1.2 - 3.5 MAGNESIUM Collection Time: 02/15/20  3:10 AM  
Result Value Ref Range Magnesium 2.0 1.8 - 2.4 mg/dL CBC WITH AUTOMATED DIFF Collection Time: 02/15/20  3:10 AM  
Result Value Ref Range WBC 1.2 (LL) 4.3 - 11.1 K/uL  
 RBC 2.39 (L) 4.05 - 5.2 M/uL HGB 8.1 (L) 11.7 - 15.4 g/dL HCT 24.8 (L) 35.8 - 46.3 % .8 (H) 79.6 - 97.8 FL  
 MCH 33.9 (H) 26.1 - 32.9 PG  
 MCHC 32.7 31.4 - 35.0 g/dL RDW 20.9 (H) 11.9 - 14.6 % PLATELET 033 (L) 322 - 450 K/uL MPV 10.1 9.4 - 12.3 FL ABSOLUTE NRBC 0.00 0.0 - 0.2 K/uL  
 DF AUTOMATED NEUTROPHILS 90 (H) 43 - 78 % LYMPHOCYTES 3 (L) 13 - 44 % MONOCYTES 6 4.0 - 12.0 % EOSINOPHILS 0 (L) 0.5 - 7.8 % BASOPHILS 0 0.0 - 2.0 % IMMATURE GRANULOCYTES 1 0.0 - 5.0 %  
 ABS. NEUTROPHILS 1.1 (L) 1.7 - 8.2 K/UL  
 ABS. LYMPHOCYTES 0.0 (L) 0.5 - 4.6 K/UL  
 ABS. MONOCYTES 0.1 0.1 - 1.3 K/UL  
 ABS. EOSINOPHILS 0.0 0.0 - 0.8 K/UL  
 ABS. BASOPHILS 0.0 0.0 - 0.2 K/UL  
 ABS. IMM. GRANS. 0.0 0.0 - 0.5 K/UL Imaging: 
n/a ASSESSMENT: 
Problem List  Date Reviewed: 1/31/2020 Codes Class Noted Febrile neutropenia (HCC) ICD-10-CM: D70.9, R50.81 ICD-9-CM: 288.00, 780.61  9/21/2019 Pancytopenia due to antineoplastic chemotherapy [facility] ICD-10-CM: D61.810, T45.1X5A 
ICD-9-CM: 284.11, E933.1  6/12/2019 Cellulitis of neck ICD-10-CM: D61.904 ICD-9-CM: 682.1  6/12/2019 Immunocompromised status associated with infection ICD-10-CM: B99.9 ICD-9-CM: 136.9  6/12/2019 Port or reservoir infection ICD-10-CM: R11.738J ICD-9-CM: 999.33  6/12/2019 Acute myeloid leukemia not having achieved remission ([facility] [address] ICD-10-CM: C92.00 ICD-9-CM: 205.00  5/9/2019  Admission for antineoplastic chemotherapy ICD-10-CM: Z51.11 
 ICD-9-CM: V58.11  5/5/2019 AML (acute myeloblastic leukemia) ([facility] ICD-10-CM: C92.00 ICD-9-CM: 205.00  4/28/2019 Weakness generalized ICD-10-CM: R53.1 ICD-9-CM: 780.79  4/28/2019 Pancytopenia ([facility] ICD-10-CM: V89.440 ICD-9-CM: 284.19  4/28/2019 Thrombocytopenia ([facility] ICD-10-CM: D69.6 ICD-9-CM: 287.5  4/27/2019 Ms. Ursula Villalobos is a 76 y.o. female admitted on 2/12/2020 with a primary diagnosis of AML. She failed induction with  7+3/Midostaurin, Decitabine/Gilteritinib x 3, and FLAG-VENITA in 11/2019. She was most recently treated with FLAG in 12/2019. Recent BMbx with CR. She is being admitted for consolidation with intermediate dose Arabella-C at 1.5 mg/m² every 12 hours on days 1 3 and 5 . She is feeling well and is ready to proceed with treatment. PLAN: 
AML 
- admit for consolidation with intermediate dose Arabella-C 
2/13 Day 2 intermediate dose Arabella-C. Tolerating treatment well. Pt really would like to attend a [employer] Day dinner at her [employer] Sunday evening. Dr. Jordan Kauffman discussed with pharmacy about changing timing of her Arabella-C to accommodate her plans to attend this function. 2/14 Day 3. Tolerating treatment well. C/o numbness in her toes 2/15 D4, more tired today, counts dropping Pancytopenia secondary to disease/chemotherapy - Transfuse prn per [facility] SOPs Continue home meds Prophylactic Antibx: Acyclovir, Vori, LVQ [facility] SOPs Lovenox for DVT prophylaxis (hold for plt <50k) Disposition:  Anticipate discharge after the completion of chemotherapy on Sun, 2/16. Per Dr. Giovanni Gupta, she will need follow-up with provider within week of discharge and labs 3 times a week with replacements as needed Katrin Mckeon NP [facility] Hematology & Oncology [id number] [address] Office : [phone] Fax : [phone]

## 2020-02-16 NOTE — PROGRESS NOTES
Problem: Pain Goal: *Control of Pain Outcome: Progressing Towards Goal 
Goal: *PALLIATIVE CARE:  Alleviation of Pain Outcome: Progressing Towards Goal 
  
Problem: Falls - Risk of 
Goal: *Absence of Falls Description Document Zohreh Aminmalachi Fall Risk and appropriate interventions in the flowsheet. Outcome: Progressing Towards Goal 
Note: Fall Risk Interventions: 
Mobility Interventions: Strengthening exercises (ROM-active/passive) Mentation Interventions: Room close to nurse's station, Increase mobility, Toileting rounds, Update white board Medication Interventions: Teach patient to arise slowly Elimination Interventions: Call light in reach, Elevated toilet seat, Patient to call for help with toileting needs, Toilet paper/wipes in reach, Toileting schedule/hourly rounds History of Falls Interventions: Room close to nurse's station Problem: Chemotherapy, Day 3 to Discharge Goal: Off Pathway (Use only if patient is Off Pathway) Outcome: Progressing Towards Goal 
Goal: Activity/Safety Outcome: Progressing Towards Goal 
Goal: Consults, if ordered Outcome: Progressing Towards Goal 
Goal: Diagnostic Test/Procedures Outcome: Progressing Towards Goal 
Goal: Nutrition/Diet Outcome: Progressing Towards Goal 
Goal: Discharge Planning Outcome: Progressing Towards Goal 
Goal: Medications Outcome: Progressing Towards Goal 
Goal: Respiratory Outcome: Progressing Towards Goal 
Goal: Treatments/Interventions/Procedures Outcome: Progressing Towards Goal 
Goal: Psychosocial 
Outcome: Progressing Towards Goal 
Goal: *Optimal pain control at patient's stated goal 
Outcome: Progressing Towards Goal 
Goal: *Hemodynamically stable Outcome: Progressing Towards Goal 
Goal: *Adequate oxygenation Outcome: Progressing Towards Goal

## 2020-02-16 NOTE — PROGRESS NOTES
Pt is for discharge home today with no needs/supportive care orders recieved for CM at this time. Pt will have close follow up at the [address] [place] [address]. Care Management Interventions PCP Verified by CM: Yes Mode of Transport at Discharge: Self Transition of Care Consult (CM Consult): Discharge Planning Discharge Durable Medical Equipment: No 
Physical Therapy Consult: No 
Occupational Therapy Consult: No 
Current Support Network: Own Home, Family Lives [place], Lives with Spouse Confirm Follow Up Transport: Family Discharge Location Discharge Placement: Home

## 2020-02-16 NOTE — PROGRESS NOTES
0648-Bedside report received from Fredia Hodgkins, RN. Resting in bed. No needs voiced. No s/s of acute distress. 1130-I have reviewed discharge instructions with the patient. The patient verbalized understanding. Discharge medications reviewed with patient and appropriate educational materials and side effects teaching were provided.

## 2020-02-16 NOTE — DISCHARGE INSTRUCTIONS
DISCHARGE SUMMARY from Nurse    PATIENT INSTRUCTIONS:    While taking prescription Narcotics:  · Limit your activities  · Do not drive and operate hazardous machinery  · Do not make important personal or business decisions  · Do  not drink alcoholic beverages  · If you have not urinated within 8 hours after discharge, please contact your surgeon on call. Please call physician after your discharge if the following symptoms present:    1. Temperature greater than 100.5  2. Heart rate greater than 90 beats per minute  3. Increased respirations   4. Chills  5. Cough with any sputum (color: yellow, white, green, or bloody?)  6. Shortness of breath or change in breathing pattern  7. Bleeding:  Any prolonged bleeding presented from skin tears, cuts, bleeding from brushing teeth, nose bleed, bleeding noted in stool  8. Tenderness, swelling, or drainage from IV site or central line catheter    Diet:Regular    Neutropenic Precautions  Thrombocytopenic Precautions      MD office #:[phone]      What to do at Home:  Recommended activity: Activity as tolerated    *  Please give a list of your current medications to your Primary Care Provider. *  Please update this list whenever your medications are discontinued, doses are      changed, or new medications (including over-the-counter products) are added. *  Please carry medication information at all times in case of emergency situations. These are general instructions for a healthy lifestyle:    No smoking/ No tobacco products/ Avoid exposure to second hand smoke  Surgeon General's Warning:  Quitting smoking now greatly reduces serious risk to your health.     Obesity, smoking, and sedentary lifestyle greatly increases your risk for illness    A healthy diet, regular physical exercise & weight monitoring are important for maintaining a healthy lifestyle    You may be retaining fluid if you have a history of heart failure or if you experience any of the following symptoms:  Weight gain of 3 pounds or more overnight or 5 pounds in a week, increased swelling in our hands or feet or shortness of breath while lying flat in bed. Please call your doctor as soon as you notice any of these symptoms; do not wait until your next office visit. The discharge information has been reviewed with the patient. The patient verbalized understanding. Discharge medications reviewed with the patient and appropriate educational materials and side effects teaching were provided.   ___________________________________________________________________________________________________________________________________

## 2020-02-16 NOTE — PROGRESS NOTES
END OF SHIFT NOTE: 
 
Intake/Output 02/15 1901 - 02/16 0700 In: 1 [P.O.:360; I.V.:470] Out: 2150 [Urine:2150] Voiding: YES Catheter: NO 
Drain:   
 
 
 
 
 
Stool:  0 occurrences. Stool Assessment Stool Appearance: Soft(per patient) (02/13/20 9778) Emesis:  0 occurrences. VITAL SIGNS Patient Vitals for the past 12 hrs: 
 Temp Pulse Resp BP SpO2  
02/16/20 0219 97.9 °F (36.6 °C) (!) 52 16 152/68 96 % 02/15/20 2321 98.3 °F (36.8 °C) 89 18 147/64 97 % 02/15/20 1912 98.5 °F (36.9 °C) 67 18 135/60 97 % Pain Assessment Pain 1 Pain Scale 1: Numeric (0 - 10) (02/16/20 0140) Pain Intensity 1: 0 (02/16/20 0140) Patient Stated Pain Goal: 0 (02/16/20 0140) Pain Reassessment 1: Yes (02/14/20 2125) Pain Onset 1: now (02/14/20 2025) Pain Location 1: Neck (02/14/20 2025) Pain Orientation 1: Left (02/14/20 2025) Pain Description 1: Aching; Sore (02/14/20 2025) Pain Intervention(s) 1: Medication (see MAR) (02/14/20 2025) Ambulating Yes Additional Information:  
Dose 5 of 6 HIDAC given;tolerated well. Port + blood return;was sluggish;after cathflo excellent blood return. Anticipates D/C today after 6th & last dose of chemo. No fever;no bleeding. Shift report given to oncoming nurse Germania RN at the bedside.  
 
Lito Mcfadden RN

## 2020-02-18 NOTE — PROGRESS NOTES
This note will not be viewable in [address] 1st attempt for 3rd [employer] outreach with no answer on home phone. Message left to return call. CC will attempt outreach again within 5 business days.

## 2020-02-18 NOTE — PROGRESS NOTES
Arrived to the [facility]. Blood draw from port completed. Patient tolerated well. Any issues or concerns during appointment: no replacements needed. Patient aware of next infusion appointment on 2/20/2020 at 10am. 
Discharged ambulatory.

## 2020-02-20 NOTE — PROGRESS NOTES
Arrived to the [facility]. Labs completed. Patient did not require replacements. Patient tolerated without problems. Any issues or concerns during appointment: port required Cathflo. Patient aware of next infusion appointment on 2/24/20 (date) at 0830 (time). Discharged ambulatory with family.

## 2020-02-24 NOTE — PROGRESS NOTES
Massage THERAPY: Daily Note Referring Physician: Kerry Minor MD 
Medical/Referring Diagnosis: Acute myeloblastic leukemia, not having achieved remission [C92.00] Precautions/Allergies: Patient has no known allergies. SUBJECTIVE: 
Present Symptoms: Neuropathy in feet Pre-Treatment Pain: 1/10 Neuropathy Scale:  8/10 Past Medical History: Ms. John Steele  has a past medical history of AML (acute myeloid leukemia) ([facility] [address] (dx- 4/2019), Depression, Hypercholesterolemia, Infection, Psychiatric disorder, and Sleep apnea. Ms. John Steele  has a past surgical history that includes ir insert tunl cvc w port over 5 years (4/30/2019); ir remove tunl cvad w port/pump (6/13/2019); hx other surgical; ir insert tunl cvc w port over 5 years (7/15/2019); and hx vascular access. Current Medications:   
  
Current Outpatient Medications:  
  acyclovir (ZOVIRAX) 400 mg tablet, Take 1 Tab by mouth two (2) times a day for 30 days. , Disp: 60 Tab, Rfl: 0 
  prednisoLONE acetate (PRED FORTE) 1 % ophthalmic suspension, Administer 2 Drops to both eyes every six (6) hours. Until 2/22/20, Disp: 5 mL, Rfl: 0 
  mupirocin (BACTROBAN) 2 % ointment, LYNDA EXT TO THE SKIN BID, Disp: , Rfl:  
  potassium chloride (K-DUR, KLOR-CON) 20 mEq tablet, Take 1 Tab by mouth daily. , Disp: 30 Tab, Rfl: 3 
  voriconazole (VFEND) 200 mg tablet, Take 1 Tab by mouth every twelve (12) hours. , Disp: 60 Tab, Rfl: 3 
  levoFLOXacin (LEVAQUIN) 500 mg tablet, Take 1 Tab by mouth daily for 30 days. , Disp: 30 Tab, Rfl: 3 
  zolpidem (AMBIEN) 10 mg tablet, Take 1 Tab by mouth nightly as needed for Sleep. Max Daily Amount: 10 mg., Disp: 30 Tab, Rfl: 0 
  DULoxetine (CYMBALTA) 30 mg capsule, Take 1 Cap by mouth daily. , Disp: 30 Cap, Rfl: 3 
  lidocaine-prilocaine (EMLA) topical cream, Apply  to affected area as needed for Pain., Disp: 30 g, Rfl: 0 
  cholecalciferol (VITAMIN D3) 400 unit tab tablet, Take 2 Tabs by mouth daily. , Disp: 60 Tab, Rfl: 0 
   ondansetron hcl (ZOFRAN) 8 mg tablet, Take 1 Tab by mouth every eight (8) hours as needed for Nausea., Disp: 90 Tab, Rfl: 0 
  vit C/E/zinc/lutein/zeaxanthin ([address] PO), Take 1 Tab by mouth daily. , Disp: , Rfl:  
  LORazepam (ATIVAN) 1 mg tablet, Take  by mouth nightly., Disp: , Rfl:  
  acetaminophen 500 mg tab 500 mg, diphenhydrAMINE 25 mg cap 25 mg, Take  by mouth nightly., Disp: , Rfl:  
  pravastatin (PRAVACHOL) 10 mg tablet, Take  by mouth nightly., Disp: , Rfl:  
 
Current Facility-Administered Medications:  
  0.9% sodium chloride infusion 250 mL, 250 mL, IntraVENous, PRN, Suyapa Mooney MD, Last Rate: 15 mL/hr at 02/24/20 1145, 250 mL at 02/24/20 1145   sodium chloride (NS) flush 10-40 mL, 10-40 mL, IntraVENous, PRN, Andrea Zuluaga MD, 10 mL at 02/24/20 1144 OBJECTIVE/ASSESSMENT: 
Observations of Patient:  No issues related to massage Response To Treatment: Feeling in feet improved, more relaxed overall Post-Treatment Pain: 1/10 Neuropathy Scale:  1/10 TREATMENT:  
 (In addition to Assessment/Re-Assessment sessions the following treatments were rendered) Treatment Provided: 
[x]  Soft tissue massage 
[]  Healing Touch Location: lower leg(s) bilaterally and feet Patient Position: Seated Time: 20 minutes PLAN OF CARE: 
 
[]  I will follow up with this patient as needed. [x]  No follow up visit necessary. Thank you for this referral. 
Monica Calles LMT

## 2020-02-24 NOTE — PROGRESS NOTES
Arrived to the [facility]. 1 unit PRBCs and 1 unit platelets completed. Patient tolerated well. Any issues or concerns during appointment: none. Patient aware of next infusion appointment on 3/2/2020 at 8am. 
Discharged ambulatory.

## 2020-02-25 NOTE — PROGRESS NOTES
This note will not be viewable in [address] Transitions of Care  Follow up Outreach Note Outreach type Phone call: Spoke with patient Home visit:  
Date/Time of Outreach: 2/25/2020 11:30 AM  
  
Has patient attended PCP or specialist follow-up appointments since last contact? What was outcome of appointment? When is next follow-up scheduled? FU with PCP Dr. Wendy VIRGEN. FU with Dr. Ryan Peguero scheduled for 2/27/2020. All other appointments scheduled appropriately. Review medications. Any medication changes since last outreach? Does patient have any questions or issues related to their medications? Medications reviewed with patient. She verbalizes an understanding of medications. Home health active? If yes  any issue? Progress? No  
  
Referrals needed? 
(CM, SW, HH, etc. ) No   
Other issues/Miscellaneous? (Transportation, access to meals, ability to perform ADLs, adequate caregiver support, etc.) Patient states that she is doing well and voices no concerns at this time. Next Outreach Scheduled? Graduation from program? 
  Yes Next Steps/Goals (if applicable): Outreach completed by: 
  Alexia Dang LPN Care Coordinator

## 2020-02-27 NOTE — PROGRESS NOTES
2/27/20:  Patient in for follow-up with NP. Patient reports fatigue and occasional times of her heart racing. NP, Jewels Henson, reviewed labs and assessed the patient. Patient encouraged to increase oral hydration. Patient also with increasing c/o neuropathy in feet especially at night. Patient agreed to try neurontin 200mg at bedtime. Patient to return on Monday for labs and probable blood products. Dr. Nereyda Loya to see the patient on Tuesday.

## 2020-03-02 NOTE — PROGRESS NOTES
Massage THERAPY: Daily Note Referring Physician: Kerry Minor MD 
Medical/Referring Diagnosis: Acute myeloblastic leukemia, not having achieved remission [C92.00] Precautions/Allergies: Patient has no known allergies. SUBJECTIVE: 
Present Symptoms: None, enjoys foot massage Pre-Treatment Pain: 1/10 Neuropathy Scale:  1/10 Past Medical History: Ms. John Steele  has a past medical history of AML (acute myeloid leukemia) ([facility] [address] (dx- 4/2019), Depression, Hypercholesterolemia, Infection, Psychiatric disorder, and Sleep apnea. Ms. John Steele  has a past surgical history that includes ir insert tunl cvc w port over 5 years (4/30/2019); ir remove tunl cvad w port/pump (6/13/2019); hx other surgical; ir insert tunl cvc w port over 5 years (7/15/2019); and hx vascular access. Current Medications:   
  
Current Outpatient Medications:  
  gabapentin (NEURONTIN) 100 mg capsule, Take 2 Caps by mouth nightly., Disp: 60 Cap, Rfl: 0 
  acyclovir (ZOVIRAX) 400 mg tablet, Take 1 Tab by mouth two (2) times a day for 30 days. , Disp: 60 Tab, Rfl: 0 
  potassium chloride (K-DUR, KLOR-CON) 20 mEq tablet, Take 1 Tab by mouth daily. , Disp: 30 Tab, Rfl: 3 
  voriconazole (VFEND) 200 mg tablet, Take 1 Tab by mouth every twelve (12) hours. , Disp: 60 Tab, Rfl: 3 
  levoFLOXacin (LEVAQUIN) 500 mg tablet, Take 1 Tab by mouth daily for 30 days. , Disp: 30 Tab, Rfl: 3 
  zolpidem (AMBIEN) 10 mg tablet, Take 1 Tab by mouth nightly as needed for Sleep. Max Daily Amount: 10 mg., Disp: 30 Tab, Rfl: 0 
  DULoxetine (CYMBALTA) 30 mg capsule, Take 1 Cap by mouth daily. , Disp: 30 Cap, Rfl: 3 
  lidocaine-prilocaine (EMLA) topical cream, Apply  to affected area as needed for Pain., Disp: 30 g, Rfl: 0 
  cholecalciferol (VITAMIN D3) 400 unit tab tablet, Take 2 Tabs by mouth daily. , Disp: 60 Tab, Rfl: 0 
  ondansetron hcl (ZOFRAN) 8 mg tablet, Take 1 Tab by mouth every eight (8) hours as needed for Nausea., Disp: 90 Tab, Rfl: 0 
   vit C/E/zinc/lutein/zeaxanthin ([address] PO), Take 1 Tab by mouth daily. , Disp: , Rfl:  
  LORazepam (ATIVAN) 1 mg tablet, Take  by mouth nightly., Disp: , Rfl:  
  acetaminophen 500 mg tab 500 mg, diphenhydrAMINE 25 mg cap 25 mg, Take  by mouth nightly., Disp: , Rfl:  
  pravastatin (PRAVACHOL) 10 mg tablet, Take  by mouth nightly., Disp: , Rfl:  
 
Current Facility-Administered Medications:  
  acetaminophen (TYLENOL) tablet 650 mg, 650 mg, Oral, ONCE, Suyapa Mooney MD 
  diphenhydrAMINE (BENADRYL) capsule 25 mg, 25 mg, Oral, ONCE, Clare Cook MD 
  0.9% sodium chloride infusion, 25 mL/hr, IntraVENous, CONTINUOUS, Suyapa Mooney MD 
  central line flush (saline) syringe 10 mL, 10 mL, InterCATHeter, PRN, Clare Cook MD, 10 mL at 03/02/20 8870 OBJECTIVE/ASSESSMENT: 
Observations of Patient:  No issues related to massage; very gentle due to low platelets Response To Treatment: More relaxed Post-Treatment Pain: 1/10 Neuropathy Scale:  1/10 TREATMENT:  
 (In addition to Assessment/Re-Assessment sessions the following treatments were rendered) Treatment Provided: 
[x]  Soft tissue massage 
[]  Healing Touch Location: lower leg(s) bilaterally and feet Patient Position: Seated Time: 20 minutes PLAN OF CARE: 
 
[]  I will follow up with this patient as needed. [x]  No follow up visit necessary. Thank you for this referral. 
Alana De La Torre LMT

## 2020-03-02 NOTE — PROGRESS NOTES
Arrived to the [facility]. Labs completed. 1u platelets (for plt 12 - per Dr. Dajuan Dolan, proceed with platelet transfusion) and 1u PRBCs (for Hgb 6.5) transfused. Patient tolerated well. Any issues or concerns during appointment: no. 
Patient aware of next lab appointment and OV on 3/3/2020 (date) at 7:50 am (time). Discharged ambulatory with spouse.

## 2020-03-02 NOTE — PROGRESS NOTES
Social Work Progress Note Name: Chele Kirk : 1945 MRN: [id number] Date of Service: 3/2/2020 Length of Service:15 minutes Patient Diagnosis: 1. Acute myeloid leukemia not having achieved remission ([facility] [address] Reason for Visit: F/U Subjective: Seen patient with  at Infusion. \"I had a rough tow weeks\". LISW-CP provided validation and sustainment as patient vent about emotional and physical distress. LISW-CP discussed with patient symptoms of distress, causes, triggers and coping skills. Pt denies any other psychosocial needs at this time. Pt declined assistance with individual therapy and/or group support. Protective Factors: Current care for physical and mental illness, adequate insight and judgment, family support, cultural and Jehovah's witness beliefs and values that support self-care. Patient did not report suicidal ideations, intent or plans. Patient did not report homicidal ideations, intent or plans. Patient is oriented to self, place, time and situation. Next Steps: LISW-CP gave contact information and encouraged pt to call should any needs arise. Pt verbalized understanding. [facility]-CP intends to follow up as needed. 3 most recent [facility] Screens 2020 Little interest or pleasure in doing things Not at all Not at all Not at all Feeling down, depressed, irritable, or hopeless More than half the days More than half the days Not at all Total Score PHQ 2 2 2 0 Trouble falling or staying asleep, or sleeping too much - - - Feeling tired or having little energy - - - Poor appetite, weight loss, or overeating - - - Feeling bad about yourself - or that you are a failure or have let yourself or your family down - - - Trouble concentrating on things such as school, work, reading, or watching TV - - - Moving or speaking so slowly that other people could have noticed; or the opposite being so fidgety that others notice - - -  
 Thoughts of being better off dead, or hurting yourself in some way - - -  
PHQ 9 Score - - - How difficult have these problems made it for you to do your work, take care of your home and get along with others - - - CARMEN Rubin

## 2020-03-03 NOTE — PROGRESS NOTES
3/3/20:  Patient in for follow-up with Dr. Marianela Rodriguez. Patient reports still feeling like heart is racing. Dr. Marianela Rodriguez reviewed labs and examined patient. Dr. Marianela Rodriguez feels heart throbbing is related to patient's anemia. Patient to return later this week for additional unit of blood since one unit didn't increase hgb much. Patient encouraged to call for any fever, chills. Patient to maintain twice a week infusion for labs and replacements. Patient to see a provider weekly. Dr. Marianela Rodriguez out of town in 2 weeks so patient to see him week of 3/23 for possible intermediate dosed rivera-c.

## 2020-03-05 NOTE — PROGRESS NOTES
Arrived to infusion. 1unit prbcs transfused and tolerated well. Denies new issues or concerns. Instructed patient to notify Dr Samantha Perry office for any issues or worrisome symptoms. She verbalized understanding. Next infusion appointment is 3/10/20 at [address] [place] [address]. Discharged ambulatory with spouse.

## 2020-03-09 NOTE — PROGRESS NOTES
3/9/20:  Patient in for follow-up with NP, Brent Sainz. Patient reports feeling good over the weekend and doing well. She only notes a few things (tendon pain in legs and dry, itchy eyes at times). Labs reviewed and patient assessed by NP. Patient with questions about tendon pain being related to levaquin long term use. ANC 0.9 today. Patient instructed to hold levaquin for now. Since counts recovering, patient will not need replacements in infusion on 3/10. Patient to see Dr. Nereyda Loya on Friday 3/13 with plans to admit for next cycle intermediate dose Arabella-C on 3/15 or 3/16.

## 2020-03-10 NOTE — PROGRESS NOTES
Arrived to the [facility]. Platelet transfusion completed. Patient tolerated well. Any issues or concerns during appointment: NO.  Patient aware of next infusion appointment on 08/15/19 (date) at 56 (time). Discharged withhout complaints. Crescentic Advancement Flap Text: The defect edges were debeveled with a #15 scalpel blade.  Given the location of the defect and the proximity to free margins a crescentic advancement flap was deemed most appropriate.  Using a sterile surgical marker, the appropriate advancement flap was drawn incorporating the defect and placing the expected incisions within the relaxed skin tension lines where possible.    The area thus outlined was incised deep to adipose tissue with a #15 scalpel blade.  The skin margins were undermined to an appropriate distance in all directions utilizing iris scissors.

## 2020-03-13 NOTE — PROGRESS NOTES
Pt was seen today by Dr. Mariann Tierney. Labs reviewed. She will not need any replacements but we will delay her admit date to Monday 3/23. She will be seen next Friday on 3/20 to make sure her counts are good. Admitting has been notified along with an e-mail to the hospital of this change. She is otherwise doing well. Port flushed per policy and de- accessed. She will call with any further needs before she is seen again.

## 2020-03-20 NOTE — PROGRESS NOTES
Pt was seen today by Dr. Michelle Carlson. labs reviewed. She will proceed with being admitted on 3/23. I have called admitting and sent new e-mail about this. Her labs have improved her [address] is at 1.4. she will need to be seen by a provider once a week and replacements twice a week. I have asked for 4/30 labs replacements, 4/2 NP with replacements, 4/6 labs and replacements and 4/9 NP with replacements. I will communicate with [facility] as to further appts that may be needed. Pt knows to wait for them to call her on Monday to let her know a bed is ready.

## 2020-03-22 NOTE — PROGRESS NOTES
04/27/19 2318 Dual Skin Pressure Injury Assessment Dual Skin Pressure Injury Assessment WDL Second Care Provider (Based on [address] [place] [address]) GT,RN  
 
 negative...

## 2020-03-23 NOTE — PROGRESS NOTES
Pt is well known to the floor admitted for Chemo treatment Chart screened by  for discharge planning. No needs identified at this time. Please consult  if any new issues arise. Discharge plan is home with no needs. Care Management Interventions PCP Verified by CM: Yes Mode of Transport at Discharge: Self Transition of Care Consult (CM Consult): Discharge Planning Discharge Durable Medical Equipment: No 
Physical Therapy Consult: No 
Occupational Therapy Consult: No 
Speech Therapy Consult: No 
Current Support Network: Own Home, Family Lives Nearby, Lives with Spouse The Plan for Transition of Care is Related to the Following Treatment Goals : no needs The Patient and/or Patient Representative was Provided with a Choice of Provider and Agrees with the Discharge Plan?: Yes Name of the Patient Representative Who was Provided with a Choice of Provider and Agrees with the Discharge Plan: pt 
Freedom of Choice List was Provided with Basic Dialogue that Supports the Patient's Individualized Plan of Care/Goals, Treatment Preferences and Shares the Quality Data Associated with the Providers?: Yes [employer] Information Provided?: No 
Discharge Location Discharge Placement: Home

## 2020-03-23 NOTE — H&P
[facility] Inpatient Hematology / Oncology History and Physical 
 
Reason for Admission:  Admission for antineoplastic chemotherapy [Z51.11] History of Present Illness: Ms. Shane Medina is a 76 y.o. female admitted on 3/23/2020 with a primary diagnosis of AML. She failed induction with  7+3/Midostaurin, Decitabine/Gilteritinib x 3, and FLAG-VENITA in 11/2019. She was most recently treated with FLAG in 12/2019. BMbx in 1/2020 with CR. She is being admitted for consolidation with intermediate dose Arabella-Ca at 1.5 mg/m² every 12 hours on days 1, 3, and 5 . She is feeling well and is ready to proceed with treatment. Review of Systems: 
Constitutional Denies fever, chills, weight loss, appetite changes, fatigue, night sweats. HEENT Denies trauma, blurry vision, hearing loss, ear pain, nosebleeds, sore throat, neck pain and ear discharge. Skin Denies lesions or rashes. Lungs Denies dyspnea, cough, sputum production or hemoptysis. Cardiovascular Denies chest pain, palpitations, or lower extremity edema. Gastrointestinal Denies nausea, vomiting, changes in bowel habits, bloody or black stools, abdominal pain.  Denies dysuria, frequency or hesitancy of urination. Neuro Denies headaches, visual changes or ataxia. Denies dizziness, tingling, tremors, sensory change, speech change, focal weakness or headaches. Hematology Denies easy bruising or bleeding, denies gingival bleeding or epistaxis. Endo Denies heat/cold intolerance, denies diabetes or thyroid abnormalities. MSK Denies back pain, arthralgias, myalgias or frequent falls. Psychiatric/Behavioral +hx depression/anxiety No Known Allergies Past Medical History:  
Diagnosis Date  AML (acute myeloid leukemia) ([facility] [address] dx- 4/2019  
 followed by dr Loida Hill  Depression  Hypercholesterolemia  Infection   
 of port -- was placed 5/2019-- removed 6/2019---right chest  
 Psychiatric disorder aniexty  Sleep apnea Past Surgical History:  
Procedure Laterality Date  HX OTHER SURGICAL    
 colonoscopy  HX VASCULAR ACCESS    
 IR INSERT TUNL CVC W PORT OVER 5 YEARS  4/30/2019  IR INSERT TUNL CVC W PORT OVER 5 YEARS  7/15/2019  IR REMOVE TUNL CVAD W PORT/PUMP  6/13/2019 Family History Problem Relation Age of Onset  Cancer Father Social History Socioeconomic History  Marital status:  Spouse name: Not on file  Number of children: Not on file  Years of education: Not on file  Highest education level: Not on file Occupational History  Not on file Social Needs  Financial resource strain: Not on file  Food insecurity Worry: Not on file Inability: Not on file  Transportation needs Medical: Not on file Non-medical: Not on file Tobacco Use  Smoking status: Never Smoker  Smokeless tobacco: Never Used Substance and Sexual Activity  Alcohol use: Never Frequency: Never  Drug use: Never  Sexual activity: Not on file Lifestyle  Physical activity Days per week: Not on file Minutes per session: Not on file  Stress: Not on file Relationships  Social connections Talks on phone: Not on file Gets together: Not on file Attends Holiness service: Not on file Active member of club or organization: Not on file Attends meetings of clubs or organizations: Not on file Relationship status: Not on file  Intimate partner violence Fear of current or ex partner: Not on file Emotionally abused: Not on file Physically abused: Not on file Forced sexual activity: Not on file Other Topics Concern [address] Service Not Asked  Blood Transfusions Not Asked  Caffeine Concern Not Asked  Occupational Exposure Not Asked [place] Hazards Not Asked  Sleep Concern Not Asked  Stress Concern Not Asked  Weight Concern Not Asked  Special Diet Not Asked  Back Care Not Asked  Exercise Not Asked  Bike Helmet Not Asked  Seat Belt Not Asked  Self-Exams Not Asked Social History Narrative  Not on file Current Facility-Administered Medications Medication Dose Route Frequency Provider Last Rate Last Dose  cholecalciferol (VITAMIN D3) (400 Units /10 mcg) tablet 2 Tab  800 Units Oral DAILY Brooke Guerrero NP   Stopped at 03/23/20 1000  acetaminophen/diphenhydrAMINE (TYLENOL PM EXT STR) 500/25 mg   Oral QHS Brooke Guerrero NP      
 DULoxetine (CYMBALTA) capsule 30 mg  30 mg Oral DAILY Brooke Guerrero NP   Stopped at 03/23/20 1000  
 gabapentin (NEURONTIN) capsule 200 mg  200 mg Oral QHS Brooke Guerrero NP      
 lidocaine-prilocaine (EMLA) 2.5-2.5 % cream   Topical PRN Brooke Guerrero NP      
 LORazepam (ATIVAN) tablet 1 mg  1 mg Oral QHS Brooke Guerrero NP      
 potassium chloride (K-DUR, KLOR-CON) SR tablet 20 mEq  20 mEq Oral DAILY Brooke Guerrero NP   Stopped at 03/23/20 1000  pravastatin (PRAVACHOL) tablet 10 mg  10 mg Oral QHS Brooke Guerrero NP      
 voriconazole (VFEND) tablet 200 mg  200 mg Oral Q12H Brooke Guerrero NP      
 zolpidem [facility] [place]) tablet 5 mg  5 mg Oral QHS PRN Brooke Guerrero NP      
 0.9% sodium chloride infusion  75 mL/hr IntraVENous CONTINUOUS Brooke Guerrero NP 75 mL/hr at 03/23/20 1003 75 mL/hr at 03/23/20 1003  enoxaparin (LOVENOX) injection 40 mg  40 mg SubCUTAneous Q24H Brooke Guerrero NP   40 mg at 03/23/20 1056  ondansetron (ZOFRAN) injection 4 mg  4 mg IntraVENous Q4H PRN Brooke Guerrero NP      
 prochlorperazine (COMPAZINE) with saline injection 5 mg  5 mg IntraVENous Q6H PRN Brooke Guerrero NP      
 HYDROcodone-acetaminophen [facility]) 5-325 mg per tablet 1 Tab  1 Tab Oral Q6H PRN Brooke Guerrero NP      
 morphine injection 2 mg  2 mg IntraVENous Q4H PRN Brooke Guerrero NP      
 prednisoLONE acetate (PRED FORTE) 1 % ophthalmic suspension 2 Drop  2 Drop Both Eyes Q6H Ej Oden MD      
  ondansetron (ZOFRAN) injection 8 mg  8 mg IntraVENous Q12H Rocio Mckeon MD      
 dexamethasone (DECADRON) 4 mg/mL injection 4 mg  4 mg IntraVENous Q12H Rocio Mckeon MD      
 cytarabine (CYTOSAR) 2,895 mg in 0.9% sodium chloride 250 mL chemo infusion  1.5 g/m2 (Treatment Plan Recorded) IntraVENous Q12H Rocio Mckeon MD      
[facility] ON 3/25/2020] ondansetron [facility]) injection 8 mg  8 mg IntraVENous Q12H Rocio Mckeon MD      
[facility] ON 3/25/2020] dexamethasone (DECADRON) 4 mg/mL injection 4 mg  4 mg IntraVENous Q12H Rocio Mckeon MD      
[facility] ON 3/25/2020] cytarabine (CYTOSAR) 2,895 mg in 0.9% sodium chloride 250 mL chemo infusion  1.5 g/m2 (Treatment Plan Recorded) IntraVENous Q12H Rocio Mckeon MD      
[facility] [START ON 3/27/2020] ondansetron TELECARE [facility]) injection 8 mg  8 mg IntraVENous Q12H Rocoi Mckeon MD      
[facility] [START ON 3/27/2020] dexamethasone (DECADRON) 4 mg/mL injection 4 mg  4 mg IntraVENous Q12H Rocio Mckeon MD      
[facility] ON 3/27/2020] cytarabine (CYTOSAR) 2,895 mg in 0.9% sodium chloride 250 mL chemo infusion  1.5 g/m2 (Treatment Plan Recorded) IntraVENous Q12H Rocio Mckeon MD      
 
 
OBJECTIVE: 
Patient Vitals for the past 8 hrs: 
 BP Temp Pulse Resp SpO2 Height Weight  
20 0909 127/73 98.3 °F (36.8 °C) 99 16 99 % 5' 6\" (1.676 m) 187 lb 6.4 oz (85 kg) Temp (24hrs), Av.3 °F (36.8 °C), Min:98.3 °F (36.8 °C), Max:98.3 °F (36.8 °C) 
 
 0701 - 03/23 1900 In: -  
Out: 100 [Urine:100] Physical Exam: 
 
 
Labs:   
Recent Results (from the past 24 hour(s)) METABOLIC PANEL, COMPREHENSIVE Collection Time: 20  9:58 AM  
Result Value Ref Range Sodium 142 136 - 145 mmol/L Potassium 3.9 3.5 - 5.1 mmol/L Chloride 109 (H) 98 - 107 mmol/L  
 CO2 29 21 - 32 mmol/L Anion gap 4 (L) 7 - 16 mmol/L Glucose 95 65 - 100 mg/dL BUN 13 8 - 23 MG/DL Creatinine 0.72 0.6 - 1.0 MG/DL  
 GFR est AA >60 >60 ml/min/1.73m2 GFR est non-AA >60 >60 ml/min/1.73m2 Calcium 9.2 8.3 - 10.4 MG/DL Bilirubin, total 0.3 0.2 - 1.1 MG/DL  
 ALT (SGPT) 18 12 - 65 U/L  
 AST (SGOT) 14 (L) 15 - 37 U/L Alk. phosphatase 110 50 - 136 U/L Protein, total 6.3 6.3 - 8.2 g/dL Albumin 3.4 3.2 - 4.6 g/dL Globulin 2.9 2.3 - 3.5 g/dL A-G Ratio 1.2 1.2 - 3.5 MAGNESIUM Collection Time: 03/23/20  9:58 AM  
Result Value Ref Range Magnesium 2.0 1.8 - 2.4 mg/dL CBC WITH AUTOMATED DIFF Collection Time: 03/23/20  9:58 AM  
Result Value Ref Range WBC 5.5 4.3 - 11.1 K/uL  
 RBC 2.71 (L) 4.05 - 5.2 M/uL HGB 9.0 (L) 11.7 - 15.4 g/dL HCT 27.8 (L) 35.8 - 46.3 % .6 (H) 79.6 - 97.8 FL  
 MCH 33.2 (H) 26.1 - 32.9 PG  
 MCHC 32.4 31.4 - 35.0 g/dL RDW 25.0 (H) 11.9 - 14.6 % PLATELET 80 (L) 453 - 450 K/uL MPV 10.6 9.4 - 12.3 FL ABSOLUTE NRBC 0.00 0.0 - 0.2 K/uL  
 DF AUTOMATED NEUTROPHILS 33 (L) 43 - 78 % LYMPHOCYTES 6 (L) 13 - 44 % MONOCYTES 57 (H) 4.0 - 12.0 % EOSINOPHILS 2 0.5 - 7.8 % BASOPHILS 0 0.0 - 2.0 % IMMATURE GRANULOCYTES 2 0.0 - 5.0 %  
 ABS. NEUTROPHILS 1.8 1.7 - 8.2 K/UL  
 ABS. LYMPHOCYTES 0.3 (L) 0.5 - 4.6 K/UL  
 ABS. MONOCYTES 3.1 (H) 0.1 - 1.3 K/UL  
 ABS. EOSINOPHILS 0.1 0.0 - 0.8 K/UL  
 ABS. BASOPHILS 0.0 0.0 - 0.2 K/UL  
 ABS. IMM. GRANS. 0.1 0.0 - 0.5 K/UL Imaging: 
n/a ASSESSMENT: 
Problem List  Date Reviewed: 3/9/2020 Codes Class Noted Febrile neutropenia (HCC) ICD-10-CM: D70.9, R50.81 ICD-9-CM: 288.00, 780.61  9/21/2019 Pancytopenia due to antineoplastic chemotherapy [facility]) ICD-10-CM: D61.810, T45.1X5A 
ICD-9-CM: 284.11, E933.1  6/12/2019 Cellulitis of neck ICD-10-CM: W45.403 ICD-9-CM: 682.1  6/12/2019 Immunocompromised status associated with infection ICD-10-CM: B99.9 ICD-9-CM: 136.9  6/12/2019 Port or reservoir infection ICD-10-CM: B04.463X ICD-9-CM: 999.33  6/12/2019  Acute myeloid leukemia not having achieved remission ([facility] [address] ICD-10-CM: C92.00 
 ICD-9-CM: 205.00  5/9/2019 Admission for antineoplastic chemotherapy ICD-10-CM: Z51.11 ICD-9-CM: V58.11  5/5/2019 [facility] ICD-10-CM: C92.00 ICD-9-CM: 205.00  4/28/2019 Weakness generalized ICD-10-CM: R53.1 ICD-9-CM: 780.79  4/28/2019 Pancytopenia ([facility] [address] ICD-10-CM: T56.391 ICD-9-CM: 284.19  4/28/2019 Thrombocytopenia ([facility] ICD-10-CM: D69.6 ICD-9-CM: 287.5  4/27/2019 PLAN: 
AML 
- admit for consolidation with intermediate dose Arabella-C 
  
Pancytopenia secondary to disease/chemotherapy 
- Ok to proceed with platelets 91Z 
-Transfuse prn per Alexander SOPs 
  
Continue home meds Prophylactic Antibx: Acyclovir, Vori Alexander SOPs Lovenox for DVT prophylaxis (hold for plt <50k) Goals and plan of care reviewed with the patient. All questions answered to the best of our ability. Disposition:  Anticipate discharge home after the completion of chemotherapy. She is scheduled for labs/replacements on 3/30 and follow up on 4/2. Ronaldo Chadwick NP [facility] [address] [address] Office : [phone] Fax : [phone]

## 2020-03-24 NOTE — PROGRESS NOTES
END OF SHIFT NOTE: 
 
Intake/Output 
03/23 1901 - 03/24 0700 In: 2907 [P.O.:600; I.V.:829] Out: 1800 [Urine:1800] Voiding: YES Catheter: NO 
Drain:   
 
 
 
 
 
Stool:  0 occurrences. Emesis:  0 occurrences. VITAL SIGNS Patient Vitals for the past 12 hrs: 
 Temp Pulse Resp BP SpO2  
03/24/20 0242 97.9 °F (36.6 °C) 69 18 144/65 96 % 03/23/20 2205 98.2 °F (36.8 °C) 77 16 140/66 98 % 03/23/20 1917 98.7 °F (37.1 °C) 96 16 140/68 96 % Pain Assessment Pain 1 Pain Scale 1: Numeric (0 - 10) (03/24/20 0130) Pain Intensity 1: 0 (03/24/20 0130) Patient Stated Pain Goal: 0 (03/24/20 0130) Ambulating Yes Additional Information:  
Dose 2 of Cytarabine given. Tolerated well. No new neuro changes noted/reported. Shift report given to oncoming nurse Julian RN at the bedside.  
 
Stefani Pereyra RN

## 2020-03-24 NOTE — PROGRESS NOTES
[facility] Hematology & Oncology Inpatient Hematology / Oncology Progress Note Admission Date: 3/23/2020  9:00 AM 
Reason for Admission/Hospital Course: Admission for antineoplastic chemotherapy [Z51.11] 24 Hour Events: 
Up on bedside couch Feeling well No complaints In good spirits ROS: 
Constitutional: negative for fever, chills, weakness, malaise, fatigue. CV: negative for chest pain, palpitations, edema. Respiratory: negative for dyspnea, cough, wheezing. GI:  negative for nausea, abdominal pain, diarrhea. 10 point review of systems is otherwise negative with the exception of the elements mentioned above in the HPI. No Known Allergies OBJECTIVE: 
Patient Vitals for the past 8 hrs: 
 BP Temp Pulse Resp SpO2  
20 1133 146/66 97.8 °F (36.6 °C) 72 18 95 % 20 0812 127/64 96.8 °F (36 °C) 78 18 96 % Temp (24hrs), Av.2 °F (36.8 °C), Min:96.8 °F (36 °C), Max:99.2 °F (37.3 °C) 
 
 0701 -  1900 In: 360 [P.O.:360] Out: 900 [Urine:900] Physical Exam: 
Constitutional: Well developed, well nourished female in no acute distress, sitting comfortably in the hospital bed. HEENT: Normocephalic and atraumatic. Oropharynx is clear, mucous membranes are moist.  Pupils are equal, round, and reactive to light. Extraocular muscles are intact. Sclerae anicteric. Skin Warm and dry. No bruising and no rash noted. No erythema. No pallor. Respiratory Lungs are clear to auscultation bilaterally without wheezes, rales or rhonchi, normal air exchange without accessory muscle use. CVS Normal rate, regular rhythm and normal S1 and S2. No murmurs, gallops, or rubs. Abdomen Soft, nontender and nondistended, normoactive bowel sounds. No palpable mass. No hepatosplenomegaly. Neuro Grossly nonfocal with no obvious sensory or motor deficits. MSK Normal range of motion in general.  No edema and no tenderness. Psych Appropriate mood and affect. Labs: 
   
Recent Labs  
  03/24/20 0228 03/23/20 
8696 WBC 3.0* 5.5  
RBC 2.81* 2.71* HGB 9.4* 9.0*  
HCT 28.4* 27.8*  
.1* 102.6*  
MCH 33.5* 33.2*  
MCHC 33.1 32.4  
RDW 24.0* 25.0*  
PLT 90* 80* GRANS 82* 33* LYMPH 6* 6*  
MONOS 11 57* EOS 0* 2 BASOS 0 0 IG 2 2 DF AUTOMATED AUTOMATED ANEU 2.5 1.8 ABL 0.2* 0.3* ABM 0.3 3.1* ALEKSANDR 0.0 0.1 ABB 0.0 0.0 AIG 0.1 0.1 Recent Labs  
  03/24/20 0228 03/23/20 
3452  142  
K 4.1 3.9 * 109* CO2 27 29 AGAP 6* 4*  
* 95 BUN 13 13 CREA 0.66 0.72 GFRAA >60 >60 GFRNA >60 >60  
CA 9.4 9.2 SGOT 14* 14*  110  
TP 6.4 6.3 ALB 3.2 3.4 GLOB 3.2 2.9 AGRAT 1.0* 1.2 MG 2.0 2.0 Imaging: 
 
Medications: 
Current Facility-Administered Medications Medication Dose Route Frequency  acyclovir (ZOVIRAX) tablet 400 mg  400 mg Oral BID  cholecalciferol (VITAMIN D3) (400 Units /10 mcg) tablet 2 Tab  800 Units Oral DAILY  acetaminophen/diphenhydrAMINE (TYLENOL PM EXT STR) 500/25 mg   Oral QHS  DULoxetine (CYMBALTA) capsule 30 mg  30 mg Oral DAILY  gabapentin (NEURONTIN) capsule 200 mg  200 mg Oral QHS  lidocaine-prilocaine (EMLA) 2.5-2.5 % cream   Topical PRN  
 LORazepam (ATIVAN) tablet 1 mg  1 mg Oral QHS  potassium chloride (K-DUR, KLOR-CON) SR tablet 20 mEq  20 mEq Oral DAILY  pravastatin (PRAVACHOL) tablet 10 mg  10 mg Oral QHS  voriconazole (VFEND) tablet 200 mg  200 mg Oral Q12H  
 0.9% sodium chloride infusion  75 mL/hr IntraVENous CONTINUOUS  
 enoxaparin (LOVENOX) injection 40 mg  40 mg SubCUTAneous Q24H  
 ondansetron (ZOFRAN) injection 4 mg  4 mg IntraVENous Q4H PRN  prochlorperazine (COMPAZINE) with saline injection 5 mg  5 mg IntraVENous Q6H PRN  
 HYDROcodone-acetaminophen (NORCO) 5-325 mg per tablet 1 Tab  1 Tab Oral Q6H PRN  
 morphine injection 2 mg  2 mg IntraVENous Q4H PRN  
  prednisoLONE acetate (PRED FORTE) 1 % ophthalmic suspension 2 Drop  2 Drop Both Eyes Q6H  
 [START ON 3/25/2020] ondansetron (ZOFRAN) injection 8 mg  8 mg IntraVENous Q12H  
 [START ON 3/25/2020] dexamethasone (DECADRON) 4 mg/mL injection 4 mg  4 mg IntraVENous Q12H  
 [START ON 3/25/2020] cytarabine (CYTOSAR) 2,895 mg in 0.9% sodium chloride 250 mL chemo infusion  1.5 g/m2 (Treatment Plan Recorded) IntraVENous Q12H  
 [START ON 3/27/2020] ondansetron (ZOFRAN) injection 8 mg  8 mg IntraVENous Q12H  
 [START ON 3/27/2020] dexamethasone (DECADRON) 4 mg/mL injection 4 mg  4 mg IntraVENous Q12H  
 [START ON 3/27/2020] cytarabine (CYTOSAR) 2,895 mg in 0.9% sodium chloride 250 mL chemo infusion  1.5 g/m2 (Treatment Plan Recorded) IntraVENous Q12H  
 heparin (porcine) pf 300 Units  300 Units InterCATHeter PRN  
 central line flush (saline) syringe 10 mL  10 mL InterCATHeter PRN  
 zolpidem (AMBIEN) tablet 10 mg  10 mg Oral QHS PRN  
 
 
 
ASSESSMENT: 
 
Problem List  Date Reviewed: 3/9/2020 Codes Class Noted Febrile neutropenia (HCC) ICD-10-CM: D70.9, R50.81 ICD-9-CM: 288.00, 780.61  9/21/2019 Pancytopenia due to antineoplastic chemotherapy [facility]) ICD-10-CM: D61.810, T45.1X5A 
ICD-9-CM: 284.11, E933.1  6/12/2019 Cellulitis of neck ICD-10-CM: S23.661 ICD-9-CM: 682.1  6/12/2019 Immunocompromised status associated with infection ICD-10-CM: B99.9 ICD-9-CM: 136.9  6/12/2019 Port or reservoir infection ICD-10-CM: W23.454Q ICD-9-CM: 999.33  6/12/2019 Acute myeloid leukemia not having achieved remission ([facility] ICD-10-CM: C92.00 ICD-9-CM: 205.00  5/9/2019 Admission for antineoplastic chemotherapy ICD-10-CM: Z51.11 ICD-9-CM: V58.11  5/5/2019 AML (acute myeloblastic leukemia) ([facility] ICD-10-CM: C92.00 ICD-9-CM: 205.00  4/28/2019 Weakness generalized ICD-10-CM: R53.1 ICD-9-CM: 780.79  4/28/2019  Pancytopenia ([facility] ICD-10-CM: H93.323 
 ICD-9-CM: 284.19  4/28/2019 Thrombocytopenia ([facility] ICD-10-CM: D69.6 ICD-9-CM: 287.5  4/27/2019 PLAN: 
 
AML 
- admit for consolidation with intermediate dose Arabella-C 
3/24 day 2 HiDAC 
  
Pancytopenia secondary to disease/chemotherapy 
- Ok to proceed with platelets 69G 
-Transfuse prn per Alexander SOPs Insomnia 
-Tylenol PM, Ativan 1 mg, prn Ambien 10 mg (home regimen) 
  
Continue home meds Prophylactic Antibx: Acyclovir, Vori Alexander SOPs Lovenox for DVT prophylaxis (hold for plt <50k) 
  
Goals and plan of care reviewed with the patient. All questions answered to the best of our ability. 
  
Disposition:  Anticipate discharge home after the completion of chemotherapy. She is scheduled for labs/replacements on 3/30 and follow up on 4/2. Goals and plan of care reviewed with the patient. All questions answered to the best of our ability. Christi Ibrahim NP [facility] Hematology & Oncology [id number] [address] Office : [phone] Fax : [phone]

## 2020-03-25 NOTE — PROGRESS NOTES
[facility] Hematology & Oncology Inpatient Hematology / Oncology Progress Note Admission Date: 3/23/2020  9:00 AM 
Reason for Admission/Hospital Course: Admission for antineoplastic chemotherapy [Z51.11] 24 Hour Events: 
Up on bedside couch Feeling well No complaints In good spirits VSS No transfusions needed ROS: 
Constitutional: negative for fever, chills, weakness, malaise, fatigue. CV: negative for chest pain, palpitations, edema. Respiratory: negative for dyspnea, cough, wheezing. GI:  negative for nausea, abdominal pain, diarrhea. 10 point review of systems is otherwise negative with the exception of the elements mentioned above in the HPI. No Known Allergies OBJECTIVE: 
Patient Vitals for the past 8 hrs: 
 BP Temp Pulse Resp SpO2  
20 0753 140/68 98.4 °F (36.9 °C) 60 16 97 % 20 0314 147/58 98.4 °F (36.9 °C) 62 16 99 % Temp (24hrs), Av.2 °F (36.8 °C), Min:97.8 °F (36.6 °C), Max:98.6 °F (37 °C) 
 
 0701 -  1900 In: 240 [P.O.:240] Out: 750 [Urine:750] Physical Exam: 
Constitutional: Well developed, well nourished female in no acute distress, sitting comfortably in the hospital bed. HEENT: Normocephalic and atraumatic. Oropharynx is clear, mucous membranes are moist.  Pupils are equal, round, and reactive to light. Extraocular muscles are intact. Sclerae anicteric. Skin Warm and dry. No bruising and no rash noted. No erythema. No pallor. Respiratory Lungs are clear to auscultation bilaterally without wheezes, rales or rhonchi, normal air exchange without accessory muscle use. CVS Normal rate, regular rhythm and normal S1 and S2. No murmurs, gallops, or rubs. Abdomen Soft, nontender and nondistended, normoactive bowel sounds. No palpable mass. No hepatosplenomegaly. Neuro Grossly nonfocal with no obvious sensory or motor deficits. MSK Normal range of motion in general.  No edema and no tenderness. Psych Appropriate mood and affect. Labs: 
   
Recent Labs  
  03/25/20 
0304 03/24/20 0228 03/23/20 
6501 WBC 4.0* 3.0* 5.5  
RBC 2.56* 2.81* 2.71* HGB 8.5* 9.4* 9.0*  
HCT 25.7* 28.4* 27.8*  
.4* 101.1* 102.6*  
MCH 33.2* 33.5* 33.2*  
MCHC 33.1 33.1 32.4  
RDW 24.3* 24.0* 25.0*  
PLT 85* 90* 80* GRANS 63 82* 33* LYMPH 3* 6* 6*  
MONOS 32* 11 57* EOS 0* 0* 2 BASOS 0 0 0 IG 2 2 2 DF AUTOMATED AUTOMATED AUTOMATED ANEU 2.5 2.5 1.8 ABL 0.1* 0.2* 0.3* ABM 1.3 0.3 3.1* ALEKSANDR 0.0 0.0 0.1 ABB 0.0 0.0 0.0 AIG 0.1 0.1 0.1 Recent Labs  
  03/25/20 
0304 03/24/20 0228 03/23/20 
7113  142 142  
K 4.0 4.1 3.9 * 109* 109* CO2 28 27 29 AGAP 5* 6* 4*  
* 142* 95 BUN 15 13 13 CREA 0.60 0.66 0.72 GFRAA >60 >60 >60 GFRNA >60 >60 >60  
CA 9.0 9.4 9.2 SGOT 11* 14* 14* AP 90 113 110  
TP 5.8* 6.4 6.3 ALB 3.0* 3.2 3.4 GLOB 2.8 3.2 2.9 AGRAT 1.1* 1.0* 1.2 MG 2.2 2.0 2.0 Imaging: 
 
Medications: 
Current Facility-Administered Medications Medication Dose Route Frequency  acetaminophen (TYLENOL) tablet 650 mg  650 mg Oral Q6H PRN  
 acyclovir (ZOVIRAX) tablet 400 mg  400 mg Oral BID  cholecalciferol (VITAMIN D3) (400 Units /10 mcg) tablet 2 Tab  800 Units Oral DAILY  acetaminophen/diphenhydrAMINE (TYLENOL PM EXT STR) 500/25 mg   Oral QHS  DULoxetine (CYMBALTA) capsule 30 mg  30 mg Oral DAILY  gabapentin (NEURONTIN) capsule 200 mg  200 mg Oral QHS  lidocaine-prilocaine (EMLA) 2.5-2.5 % cream   Topical PRN  
 LORazepam (ATIVAN) tablet 1 mg  1 mg Oral QHS  potassium chloride (K-DUR, KLOR-CON) SR tablet 20 mEq  20 mEq Oral DAILY  pravastatin (PRAVACHOL) tablet 10 mg  10 mg Oral QHS  voriconazole (VFEND) tablet 200 mg  200 mg Oral Q12H  
 0.9% sodium chloride infusion  75 mL/hr IntraVENous CONTINUOUS  
 enoxaparin (LOVENOX) injection 40 mg  40 mg SubCUTAneous Q24H  ondansetron (ZOFRAN) injection 4 mg  4 mg IntraVENous Q4H PRN  prochlorperazine (COMPAZINE) with saline injection 5 mg  5 mg IntraVENous Q6H PRN  
 HYDROcodone-acetaminophen (NORCO) 5-325 mg per tablet 1 Tab  1 Tab Oral Q6H PRN  
 morphine injection 2 mg  2 mg IntraVENous Q4H PRN  prednisoLONE acetate (PRED FORTE) 1 % ophthalmic suspension 2 Drop  2 Drop Both Eyes Q6H  
 ondansetron (ZOFRAN) injection 8 mg  8 mg IntraVENous Q12H  
 dexamethasone (DECADRON) 4 mg/mL injection 4 mg  4 mg IntraVENous Q12H  cytarabine (CYTOSAR) 2,895 mg in 0.9% sodium chloride 250 mL chemo infusion  1.5 g/m2 (Treatment Plan Recorded) IntraVENous Q12H  
 [START ON 3/27/2020] ondansetron (ZOFRAN) injection 8 mg  8 mg IntraVENous Q12H  
 [START ON 3/27/2020] dexamethasone (DECADRON) 4 mg/mL injection 4 mg  4 mg IntraVENous Q12H  
 [START ON 3/27/2020] cytarabine (CYTOSAR) 2,895 mg in 0.9% sodium chloride 250 mL chemo infusion  1.5 g/m2 (Treatment Plan Recorded) IntraVENous Q12H  
 heparin (porcine) pf 300 Units  300 Units InterCATHeter PRN  
 central line flush (saline) syringe 10 mL  10 mL InterCATHeter PRN  
 zolpidem (AMBIEN) tablet 10 mg  10 mg Oral QHS PRN  
 
 
 
ASSESSMENT: 
 
Problem List  Date Reviewed: 3/9/2020 Codes Class Noted Febrile neutropenia (HCC) ICD-10-CM: D70.9, R50.81 ICD-9-CM: 288.00, 780.61  9/21/2019 Pancytopenia due to antineoplastic chemotherapy [facility]) ICD-10-CM: D61.810, T45.1X5A 
ICD-9-CM: 284.11, E933.1  6/12/2019 Cellulitis of neck ICD-10-CM: B85.471 ICD-9-CM: 682.1  6/12/2019 Immunocompromised status associated with infection ICD-10-CM: B99.9 ICD-9-CM: 136.9  6/12/2019 Port or reservoir infection ICD-10-CM: G78.430O ICD-9-CM: 999.33  6/12/2019 Acute myeloid leukemia not having achieved remission ([facility] ICD-10-CM: C92.00 ICD-9-CM: 205.00  5/9/2019  Admission for antineoplastic chemotherapy ICD-10-CM: Z51.11 
 ICD-9-CM: V58.11  5/5/2019 AML (acute myeloblastic leukemia) ([facility] ICD-10-CM: C92.00 ICD-9-CM: 205.00  4/28/2019 Weakness generalized ICD-10-CM: R53.1 ICD-9-CM: 780.79  4/28/2019 Pancytopenia ([facility] ICD-10-CM: R58.399 ICD-9-CM: 284.19  4/28/2019 Thrombocytopenia ([facility] ICD-10-CM: D69.6 ICD-9-CM: 287.5  4/27/2019 PLAN: 
 
AML 
- admit for consolidation with intermediate dose Arabella-C 
3/24 day 2 HiDAC 
  
Pancytopenia secondary to disease/chemotherapy 
- Ok to proceed with platelets 40E 
-Transfuse prn per Alexander SOPs Insomnia 
-Tylenol PM, Ativan 1 mg, prn Ambien 10 mg (home regimen) 
  
Continue home meds Prophylactic Antibx: Acyclovir, Vori Alexander SOPs Lovenox for DVT prophylaxis (hold for plt <50k) 
  
Goals and plan of care reviewed with the patient. All questions answered to the best of our ability. 
  
Disposition:  Anticipate discharge home after the completion of chemotherapy. She is scheduled for labs/replacements on 3/30 and follow up on 4/2. Goals and plan of care reviewed with the patient. All questions answered to the best of our ability. Zonia Dorado NP [address] [place] [address] Hematology & Oncology [address] [place] [address] Office : [phone] Fax : [phone]

## 2020-03-25 NOTE — PROGRESS NOTES
End of shift report: 
 
Report given to night shift RN, Jean Pierre Monzon. Patient reported having no pain throughout the day. Ordered miralax per patient request. 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  
 
Gisela Zurita RN

## 2020-03-25 NOTE — PROGRESS NOTES
Problem: Falls - Risk of 
Goal: *Absence of Falls Description: Document Etienne Dasilva Fall Risk and appropriate interventions in the flowsheet. 3/25/2020 1751 by Samaria Lanza Outcome: Progressing Towards Goal 
Note: Fall Risk Interventions: 
Mobility Interventions: Strengthening exercises (ROM-active/passive) Mentation Interventions: Adequate sleep, hydration, pain control, Eyeglasses and hearing aids, Increase mobility, Update white board, More frequent rounding Medication Interventions: Teach patient to arise slowly, Evaluate medications/consider consulting pharmacy Elimination Interventions: Call light in reach History of Falls Interventions: Evaluate medications/consider consulting pharmacy, Room close to nurse's station 3/25/2020 1308 by Samaria Lanza Outcome: Progressing Towards Goal 
Note: Fall Risk Interventions: 
Mobility Interventions: Strengthening exercises (ROM-active/passive) Mentation Interventions: Adequate sleep, hydration, pain control, Eyeglasses and hearing aids, Increase mobility Medication Interventions: Teach patient to arise slowly Elimination Interventions: Call light in reach History of Falls Interventions: Room close to nurse's station Problem: Patient Education: Go to Patient Education Activity Goal: Patient/Family Education 3/25/2020 1751 by Samaria Lanza Outcome: Progressing Towards Goal 
3/25/2020 1308 by Samaria Lanza Outcome: Progressing Towards Goal 
  
Problem: Chemotherapy, Day 3 to Discharge Goal: Off Pathway (Use only if patient is Off Pathway) 3/25/2020 1751 by Samaria Lanza Outcome: Progressing Towards Goal 
3/25/2020 1308 by Samaria Lanza Outcome: Progressing Towards Goal 
Goal: Activity/Safety 3/25/2020 1751 by Samaria Lanza Outcome: Progressing Towards Goal 
3/25/2020 1308 by Saamria Lanza Outcome: Progressing Towards Goal 
Goal: Consults, if ordered 3/25/2020 1751 by Samaria Lanza Outcome: Progressing Towards Goal 
 3/25/2020 1308 by Roxana Walden Outcome: Progressing Towards Goal 
Goal: Diagnostic Test/Procedures 3/25/2020 1751 by Roxana Walden Outcome: Progressing Towards Goal 
3/25/2020 1308 by Roxana Iram Outcome: Progressing Towards Goal 
Goal: Nutrition/Diet 3/25/2020 1751 by Roxana Walden Outcome: Progressing Towards Goal 
3/25/2020 1308 by Roxana Iram Outcome: Progressing Towards Goal 
Goal: Discharge Planning 3/25/2020 1751 by Roxana Walden Outcome: Progressing Towards Goal 
3/25/2020 1308 by Roxana Iram Outcome: Progressing Towards Goal 
Goal: Medications 3/25/2020 1751 by Roxana Walden Outcome: Progressing Towards Goal 
3/25/2020 1308 by Roxana rIam Outcome: Progressing Towards Goal 
Goal: Respiratory 3/25/2020 1751 by Roxana Walden Outcome: Progressing Towards Goal 
3/25/2020 1308 by Roxana Iram Outcome: Progressing Towards Goal 
Goal: Treatments/Interventions/Procedures 3/25/2020 1751 by Roxana Walden Outcome: Progressing Towards Goal 
3/25/2020 1308 by Roxana Iram Outcome: Progressing Towards Goal 
Goal: Psychosocial 
3/25/2020 1751 by Roxana Walden Outcome: Progressing Towards Goal 
3/25/2020 1308 by Roxana Iram Outcome: Progressing Towards Goal 
Goal: *Optimal pain control at patient's stated goal 
3/25/2020 1751 by Roxana Walden Outcome: Progressing Towards Goal 
3/25/2020 1308 by Roxana Iram Outcome: Progressing Towards Goal 
Goal: *Hemodynamically stable 3/25/2020 1751 by Roxana Walden Outcome: Progressing Towards Goal 
3/25/2020 1308 by Roxana Iram Outcome: Progressing Towards Goal 
Goal: *Adequate oxygenation 3/25/2020 1751 by Roxana Iram Outcome: Progressing Towards Goal 
3/25/2020 1308 by Roxana Alyshanels Outcome: Progressing Towards Goal

## 2020-03-25 NOTE — PROGRESS NOTES
Chart screened by  for discharge planning. No needs identified at this time. Please consult  if any new issues arise Discharge home when chemo treatment is complete. Case management continue to follow.

## 2020-03-25 NOTE — PROGRESS NOTES
END OF SHIFT NOTE: 
 
Intake/Output 
03/24 1901 - 03/25 0700 In: 2148 [P.O.:600; I.V.:1548] Out: 2100 [Urine:2100] Voiding: YES Catheter: NO 
Drain:   
 
 
 
 
 
Stool:  0 occurrences. Emesis:  0 occurrences. VITAL SIGNS Patient Vitals for the past 12 hrs: 
 Temp Pulse Resp BP SpO2  
03/25/20 0314 98.4 °F (36.9 °C) 62 16 147/58 99 % 03/24/20 2200 98.1 °F (36.7 °C) 67 16 159/67 96 % 03/24/20 1841 98.1 °F (36.7 °C) 76 18 155/65 94 % Pain Assessment Pain 1 Pain Scale 1: Numeric (0 - 10) (03/25/20 0420) Pain Intensity 1: 0 (03/25/20 0420) Patient Stated Pain Goal: 0 (03/25/20 0420) Pain Reassessment 1: Yes (03/25/20 0420) Pain Onset 1: \"just started\" per pt (03/25/20 0321) Pain Location 1: Head (03/25/20 0321) Pain Orientation 1: Other (comment)(\"whole head \" per pt) (03/25/20 0321) Pain Description 1: Aching (03/25/20 0321) Pain Intervention(s) 1: Medication (see MAR); Environmental changes; Rest (03/25/20 0321) Ambulating Yes Additional Information: Afebrile. Day 3 chemo HIDAC today. Shift report given to oncoming nurse KAM Chen at the bedside.  
 
Emilee Pérez RN

## 2020-03-25 NOTE — PROGRESS NOTES
Initial visit by  to convey care and concern and encourage patient that  services are available if desired. Mrs. Oscar Dao was in good spirits. I offered spiritual interventions including prayer during the visit. Chaplains remain available for support. Melina Helton MDiv Board Certified Esther Solorzano

## 2020-03-26 NOTE — PROGRESS NOTES
END OF SHIFT NOTE: 
 
Intake/Output 
03/25 1901 - 03/26 0700 In: 2711 [P.O.:240; I.V.:1238] Out: 5440 [Urine:2425] Voiding: YES Catheter: NO 
Drain:   
 
 
 
 
 
Stool:  0 occurrences. Emesis:  0 occurrences. VITAL SIGNS Patient Vitals for the past 12 hrs: 
 Temp Pulse Resp BP SpO2  
03/26/20 0230 98.2 °F (36.8 °C) (!) 58 16 152/56 99 % 03/25/20 2256 98.6 °F (37 °C) 63 18 143/61 95 % 03/25/20 2013 98.2 °F (36.8 °C) 60 18 163/72 96 % Pain Assessment Pain 1 Pain Scale 1: Numeric (0 - 10) (03/26/20 0110) Pain Intensity 1: 0 (03/26/20 0110) Patient Stated Pain Goal: 0 (03/26/20 0110) Pain Reassessment 1: Yes (03/25/20 0420) Pain Onset 1: \"just started\" per pt (03/25/20 0321) Pain Location 1: Head (03/25/20 0321) Pain Orientation 1: Other (comment)(\"whole head \" per pt) (03/25/20 0321) Pain Description 1: Aching (03/25/20 0321) Pain Intervention(s) 1: Medication (see MAR); Environmental changes; Rest (03/25/20 0321) Ambulating Yes Additional Information:  
Chemo HIDAC. Cytarabine dose given. No neuro changes noted/reported. Tolerated well. Afebrile. No new complaints. Shift report given to oncoming nurse Pham RN at the bedside.  
 
Luis F Hunt RN

## 2020-03-26 NOTE — PROGRESS NOTES
END OF SHIFT NOTE: 
Cycle 2 HIDAC S+3  Day 4 Patient had a uneventful day. Patient watch television and justin. No complain of pain or nausea. No fevers with good appetite. Patient up and about in room. Intake/Output 
03/26 0701 - 03/26 1900 In: 46 [P.O.:240; I.V.:452] Out: 1300 [Urine:1300] Voiding: Yes Catheter:  No  
Drain:   
 
 
 
 
 
Stool:  One  reports occurrences. Stool Assessment Stool Color: Brown(PER PATIENT'S REPORT ) (03/26/20 0855) Stool Appearance: Formed; Soft (03/26/20 0855) Stool Amount: Large (03/26/20 0855) Stool Source/Status: Rectum (03/26/20 0855) Emesis:   No occurrences. VITAL SIGNS Patient Vitals for the past 12 hrs: 
 Temp Pulse Resp BP SpO2  
03/26/20 1524 98 °F (36.7 °C) 60 18 136/67 99 % 03/26/20 1105 98.3 °F (36.8 °C) 71 16 134/72 98 % 03/26/20 0804 98.2 °F (36.8 °C) 60 16 139/65 100 % Pain Assessment Pain 1 Pain Scale 1: Numeric (0 - 10) (03/26/20 0110) Pain Intensity 1: 0 (03/26/20 0110) Patient Stated Pain Goal: 0 (03/26/20 0110) Pain Reassessment 1: Yes (03/25/20 0420) Pain Onset 1: \"just started\" per pt (03/25/20 0321) Pain Location 1: Head (03/25/20 0321) Pain Orientation 1: Other (comment)(\"whole head \" per pt) (03/25/20 0321) Pain Description 1: Aching (03/25/20 0321) Pain Intervention(s) 1: Medication (see MAR); Environmental changes; Rest (03/25/20 0321) Ambulating Yes in room Additional Information: see above Shift report given to oncoming nurse at the bedside.  
 
Moe Aleman RN

## 2020-03-26 NOTE — PROGRESS NOTES
[facility] Hematology & Oncology Inpatient Hematology / Oncology Progress Note Admission Date: 3/23/2020  9:00 AM 
Reason for Admission/Hospital Course: Admission for antineoplastic chemotherapy [Z51.11] 24 Hour Events: 
Up on bedside couch Feeling well No complaints In good spirits VSS No transfusions needed ROS: 
Constitutional: negative for fever, chills, weakness, malaise, fatigue. CV: negative for chest pain, palpitations, edema. Respiratory: negative for dyspnea, cough, wheezing. GI:  negative for nausea, abdominal pain, diarrhea. 10 point review of systems is otherwise negative with the exception of the elements mentioned above in the HPI. No Known Allergies OBJECTIVE: 
Patient Vitals for the past 8 hrs: 
 BP Temp Pulse Resp SpO2  
20 0804 139/65 98.2 °F (36.8 °C) 60 16 100 % Temp (24hrs), Av.4 °F (36.9 °C), Min:98.2 °F (36.8 °C), Max:98.9 °F (37.2 °C) 
 
 0701 -  1900 In: -  
Out: 701 [KWBFN:124] Physical Exam: 
Constitutional: Well developed, well nourished female in no acute distress, sitting comfortably in the hospital bedside couch HEENT: Normocephalic and atraumatic. Oropharynx is clear, mucous membranes are moist.  Pupils are equal, round, and reactive to light. Extraocular muscles are intact. Sclerae anicteric. Skin Warm and dry. No bruising and no rash noted. No erythema. No pallor. Respiratory Lungs are clear to auscultation bilaterally without wheezes, rales or rhonchi, normal air exchange without accessory muscle use. CVS Normal rate, regular rhythm and normal S1 and S2. No murmurs, gallops, or rubs. Abdomen Soft, nontender and nondistended, normoactive bowel sounds. No palpable mass. No hepatosplenomegaly. Neuro Grossly nonfocal with no obvious sensory or motor deficits. MSK Normal range of motion in general.  No edema and no tenderness. Psych Appropriate mood and affect. Labs: Recent Labs  
  03/26/20 0223 03/25/20 0304 03/24/20 
5247 WBC 1.6* 4.0* 3.0*  
RBC 2.57* 2.56* 2.81* HGB 8.5* 8.5* 9.4* HCT 25.9* 25.7* 28.4*  
.8* 100.4* 101.1*  
MCH 33.1* 33.2* 33.5*  
MCHC 32.8 33.1 33.1 RDW 23.9* 24.3* 24.0*  
PLT 91* 85* 90* GRANS 80* 63 82* LYMPH 3* 3* 6*  
MONOS 10 32* 11  
EOS 0* 0* 0*  
BASOS 0 0 0 IG 7* 2 2 DF AUTOMATED AUTOMATED AUTOMATED ANEU 1.3* 2.5 2.5 ABL 0.0* 0.1* 0.2* ABM 0.2 1.3 0.3 ALEKSANDR 0.0 0.0 0.0 ABB 0.0 0.0 0.0 AIG 0.1 0.1 0.1 Recent Labs  
  03/26/20 0223 03/25/20 0304 03/24/20 
7483  144 142  
K 4.2 4.0 4.1 * 111* 109* CO2 29 28 27 AGAP 4* 5* 6*  
* 135* 142* BUN 12 15 13 CREA 0.56* 0.60 0.66 GFRAA >60 >60 >60 GFRNA >60 >60 >60  
CA 9.2 9.0 9.4 SGOT 12* 11* 14* AP 89 90 113  
TP 5.9* 5.8* 6.4 ALB 3.2 3.0* 3.2 GLOB 2.7 2.8 3.2 AGRAT 1.2 1.1* 1.0*  
MG 1.9 2.2 2.0 Imaging: 
 
Medications: 
Current Facility-Administered Medications Medication Dose Route Frequency  acetaminophen (TYLENOL) tablet 650 mg  650 mg Oral Q6H PRN  polyethylene glycol (MIRALAX) packet 17 g  17 g Oral DAILY PRN  
 acyclovir (ZOVIRAX) tablet 400 mg  400 mg Oral BID  cholecalciferol (VITAMIN D3) (400 Units /10 mcg) tablet 2 Tab  800 Units Oral DAILY  acetaminophen/diphenhydrAMINE (TYLENOL PM EXT STR) 500/25 mg   Oral QHS  DULoxetine (CYMBALTA) capsule 30 mg  30 mg Oral DAILY  gabapentin (NEURONTIN) capsule 200 mg  200 mg Oral QHS  lidocaine-prilocaine (EMLA) 2.5-2.5 % cream   Topical PRN  
 LORazepam (ATIVAN) tablet 1 mg  1 mg Oral QHS  potassium chloride (K-DUR, KLOR-CON) SR tablet 20 mEq  20 mEq Oral DAILY  pravastatin (PRAVACHOL) tablet 10 mg  10 mg Oral QHS  voriconazole (VFEND) tablet 200 mg  200 mg Oral Q12H  
 0.9% sodium chloride infusion  75 mL/hr IntraVENous CONTINUOUS  
 enoxaparin (LOVENOX) injection 40 mg  40 mg SubCUTAneous Q24H  ondansetron (ZOFRAN) injection 4 mg  4 mg IntraVENous Q4H PRN  prochlorperazine (COMPAZINE) with saline injection 5 mg  5 mg IntraVENous Q6H PRN  
 HYDROcodone-acetaminophen (NORCO) 5-325 mg per tablet 1 Tab  1 Tab Oral Q6H PRN  
 morphine injection 2 mg  2 mg IntraVENous Q4H PRN  prednisoLONE acetate (PRED FORTE) 1 % ophthalmic suspension 2 Drop  2 Drop Both Eyes Q6H  
 [START ON 3/27/2020] ondansetron (ZOFRAN) injection 8 mg  8 mg IntraVENous Q12H  
 [START ON 3/27/2020] dexamethasone (DECADRON) 4 mg/mL injection 4 mg  4 mg IntraVENous Q12H  
 [START ON 3/27/2020] cytarabine (CYTOSAR) 2,895 mg in 0.9% sodium chloride 250 mL chemo infusion  1.5 g/m2 (Treatment Plan Recorded) IntraVENous Q12H  
 heparin (porcine) pf 300 Units  300 Units InterCATHeter PRN  
 central line flush (saline) syringe 10 mL  10 mL InterCATHeter PRN  
 zolpidem (AMBIEN) tablet 10 mg  10 mg Oral QHS PRN  
 
 
 
ASSESSMENT: 
 
Problem List  Date Reviewed: 3/9/2020 Codes Class Noted Febrile neutropenia (HCC) ICD-10-CM: D70.9, R50.81 ICD-9-CM: 288.00, 780.61  9/21/2019 Pancytopenia due to antineoplastic chemotherapy [facility]) ICD-10-CM: D61.810, T45.1X5A 
ICD-9-CM: 284.11, E933.1  6/12/2019 Cellulitis of neck ICD-10-CM: D91.338 ICD-9-CM: 682.1  6/12/2019 Immunocompromised status associated with infection ICD-10-CM: B99.9 ICD-9-CM: 136.9  6/12/2019 Port or reservoir infection ICD-10-CM: Q42.667A ICD-9-CM: 999.33  6/12/2019 Acute myeloid leukemia not having achieved remission ([facility] ICD-10-CM: C92.00 ICD-9-CM: 205.00  5/9/2019 Admission for antineoplastic chemotherapy ICD-10-CM: Z51.11 ICD-9-CM: V58.11  5/5/2019 AML (acute myeloblastic leukemia) ([facility] ICD-10-CM: C92.00 ICD-9-CM: 205.00  4/28/2019 Weakness generalized ICD-10-CM: R53.1 ICD-9-CM: 780.79  4/28/2019 Pancytopenia ([facility] ICD-10-CM: N88.066 ICD-9-CM: 284.19  4/28/2019 [facility]ocyt[facility] ([facility] ICD-10-CM: D69.6 ICD-9-CM: 287.5  4/27/2019 PLAN: 
 
AML 
- admit for consolidation with intermediate dose Arabella-C 
3/24 day 2 HiDAC completes on Saturday 
  
Pancytopenia secondary to disease/chemotherapy 
- Ok to proceed with platelets 27Y 
-Transfuse prn per Alexander SOPs Insomnia 
-Tylenol PM, Ativan 1 mg, prn Ambien 10 mg (home regimen) 
  
Continue home meds Prophylactic Antibx: Acyclovir, Vori Alexander SOPs Lovenox for DVT prophylaxis (hold for plt <50k) 
  
Goals and plan of care reviewed with the patient. All questions answered to the best of our ability. 
  
Disposition:  Anticipate discharge home after the completion of chemotherapy on Saturday. She is scheduled for labs/replacements on 3/30 and follow up on 4/2. Goals and plan of care reviewed with the patient. All questions answered to the best of our ability. Doug Madsen NP [facility] Hematology & Oncology [id number] [place] [address] Office : [phone] Fax : [phone]

## 2020-03-27 NOTE — PROGRESS NOTES
[facility] Hematology & Oncology Inpatient Hematology / Oncology Progress Note Admission Date: 3/23/2020  9:00 AM 
Reason for Admission/Hospital Course: Admission for antineoplastic chemotherapy [Z51.11] 24 Hour Events: 
Up on bedside couch Feeling well Complaint of acid reflux-protonix added Plans of discharge tomorrow ROS: 
Constitutional: negative for fever, chills, weakness, malaise, fatigue. CV: negative for chest pain, palpitations, edema. Respiratory: negative for dyspnea, cough, wheezing. GI:  negative for nausea, abdominal pain, diarrhea. 10 point review of systems is otherwise negative with the exception of the elements mentioned above in the HPI. No Known Allergies OBJECTIVE: 
Patient Vitals for the past 8 hrs: 
 BP Temp Pulse Resp SpO2  
20 1101 131/72 98.7 °F (37.1 °C) 66 18 98 % 20 0827 135/58 98.5 °F (36.9 °C) 75 18 99 % 20 0354 173/74 98.5 °F (36.9 °C) (!) 50 16 100 % Temp (24hrs), Av.4 °F (36.9 °C), Min:98 °F (36.7 °C), Max:98.7 °F (37.1 °C) 
 
 0701 -  1900 In: 461 [P.O.:360; I.V.:560] Out: 1300 [Urine:1300] Physical Exam: 
Constitutional: Well developed, well nourished female in no acute distress, sitting comfortably in the hospital bedside couch HEENT: Normocephalic and atraumatic. Oropharynx is clear, mucous membranes are moist.  Pupils are equal, round, and reactive to light. Extraocular muscles are intact. Sclerae anicteric. Skin Warm and dry. No bruising and no rash noted. No erythema. No pallor. Respiratory Lungs are clear to auscultation bilaterally without wheezes, rales or rhonchi, normal air exchange without accessory muscle use. CVS Normal rate, regular rhythm and normal S1 and S2. No murmurs, gallops, or rubs. Abdomen Soft, nontender and nondistended, normoactive bowel sounds. No palpable mass. No hepatosplenomegaly. Neuro Grossly nonfocal with no obvious sensory or motor deficits. MSK Normal range of motion in general.  No edema and no tenderness. Psych Appropriate mood and affect. Labs: 
   
Recent Labs  
  03/27/20 0405 03/26/20 0223 03/25/20 
0304 WBC 2.1* 1.6* 4.0*  
RBC 2.49* 2.57* 2.56* HGB 8.4* 8.5* 8.5* HCT 24.7* 25.9* 25.7* MCV 99.2* 100.8* 100.4* MCH 33.7* 33.1* 33.2*  
MCHC 34.0 32.8 33.1 RDW 23.4* 23.9* 24.3*  
PLT 91* 91* 85* GRANS 61 80* 63  
LYMPH 2* 3* 3*  
MONOS 35* 10 32* EOS 0* 0* 0*  
BASOS 0 0 0 IG 1 7* 2 DF AUTOMATED AUTOMATED AUTOMATED ANEU 1.3* 1.3* 2.5 ABL 0.1* 0.0* 0.1* ABM 0.7 0.2 1.3 ALEKSANDR 0.0 0.0 0.0 ABB 0.0 0.0 0.0 AIG 0.0 0.1 0.1 Recent Labs  
  03/27/20 0405 03/26/20 0223 03/25/20 
0304  142 144  
K 3.9 4.2 4.0  
* 109* 111* CO2 30 29 28 AGAP 4* 4* 5* * 138* 135* BUN 15 12 15 CREA 0.57* 0.56* 0.60 GFRAA >60 >60 >60 GFRNA >60 >60 >60  
CA 9.1 9.2 9.0 SGOT 11* 12* 11* AP 85 89 90  
TP 5.9* 5.9* 5.8* ALB 3.4 3.2 3.0*  
GLOB 2.5 2.7 2.8 AGRAT 1.4 1.2 1.1*  
MG 2.2 1.9 2.2 Imaging: 
 
Medications: 
Current Facility-Administered Medications Medication Dose Route Frequency  acetaminophen (TYLENOL) tablet 650 mg  650 mg Oral Q6H PRN  polyethylene glycol (MIRALAX) packet 17 g  17 g Oral DAILY PRN  
 acyclovir (ZOVIRAX) tablet 400 mg  400 mg Oral BID  cholecalciferol (VITAMIN D3) (400 Units /10 mcg) tablet 2 Tab  800 Units Oral DAILY  acetaminophen/diphenhydrAMINE (TYLENOL PM EXT STR) 500/25 mg   Oral QHS  DULoxetine (CYMBALTA) capsule 30 mg  30 mg Oral DAILY  gabapentin (NEURONTIN) capsule 200 mg  200 mg Oral QHS  lidocaine-prilocaine (EMLA) 2.5-2.5 % cream   Topical PRN  
 LORazepam (ATIVAN) tablet 1 mg  1 mg Oral QHS  potassium chloride (K-DUR, KLOR-CON) SR tablet 20 mEq  20 mEq Oral DAILY  pravastatin (PRAVACHOL) tablet 10 mg  10 mg Oral QHS  voriconazole (VFEND) tablet 200 mg  200 mg Oral Q12H  
 0.9% sodium chloride infusion  75 mL/hr IntraVENous CONTINUOUS  
 enoxaparin (LOVENOX) injection 40 mg  40 mg SubCUTAneous Q24H  
 ondansetron (ZOFRAN) injection 4 mg  4 mg IntraVENous Q4H PRN  prochlorperazine (COMPAZINE) with saline injection 5 mg  5 mg IntraVENous Q6H PRN  
 HYDROcodone-acetaminophen (NORCO) 5-325 mg per tablet 1 Tab  1 Tab Oral Q6H PRN  
 morphine injection 2 mg  2 mg IntraVENous Q4H PRN  prednisoLONE acetate (PRED FORTE) 1 % ophthalmic suspension 2 Drop  2 Drop Both Eyes Q6H  
 ondansetron (ZOFRAN) injection 8 mg  8 mg IntraVENous Q12H  
 dexamethasone (DECADRON) 4 mg/mL injection 4 mg  4 mg IntraVENous Q12H  cytarabine (CYTOSAR) 2,895 mg in 0.9% sodium chloride 250 mL chemo infusion  1.5 g/m2 (Treatment Plan Recorded) IntraVENous Q12H  
 heparin (porcine) pf 300 Units  300 Units InterCATHeter PRN  
 central line flush (saline) syringe 10 mL  10 mL InterCATHeter PRN  
 zolpidem (AMBIEN) tablet 10 mg  10 mg Oral QHS PRN  
 
 
 
ASSESSMENT: 
 
Problem List  Date Reviewed: 3/9/2020 Codes Class Noted Febrile neutropenia (HCC) ICD-10-CM: D70.9, R50.81 ICD-9-CM: 288.00, 780.61  9/21/2019 Pancytopenia due to antineoplastic chemotherapy [facility]) ICD-10-CM: D61.810, T45.1X5A 
ICD-9-CM: 284.11, E933.1  6/12/2019 Cellulitis of neck ICD-10-CM: T39.550 ICD-9-CM: 682.1  6/12/2019 Immunocompromised status associated with infection ICD-10-CM: B99.9 ICD-9-CM: 136.9  6/12/2019 Port or reservoir infection ICD-10-CM: V36.600L ICD-9-CM: 999.33  6/12/2019 Acute myeloid leukemia not having achieved remission ([facility] ICD-10-CM: C92.00 ICD-9-CM: 205.00  5/9/2019 Admission for antineoplastic chemotherapy ICD-10-CM: Z51.11 ICD-9-CM: V58.11  5/5/2019 AML [facility]acute myeloblastic [facility]) [facility] ICD-10-CM: C92.00 ICD-9-CM: 205.00  4/28/2019 Weakness generalized ICD-10-CM: R53.1 ICD-9-CM: 780.79  4/28/2019 Pancytopenia ([facility] ICD-10-CM: R95.095 ICD-9-CM: 284.19  4/28/2019 Thrombocytopenia ([facility] ICD-10-CM: D69.6 ICD-9-CM: 287.5  4/27/2019 PLAN: 
 
AML 
- admit for consolidation with intermediate dose Arabella-C 
3/24 day 2 HiDAC completes on Saturday 
  
Pancytopenia secondary to disease/chemotherapy 
- Ok to proceed with platelets 42Y 
-Transfuse prn per Alexander SOPs Insomnia 
-Tylenol PM, Ativan 1 mg, prn Ambien 10 mg (home regimen) GERD 
-protonix 
  
Continue home meds Prophylactic Antibx: Acyclovir, Vori Alexander SOPs Lovenox for DVT prophylaxis (hold for plt <50k) 
  
Goals and plan of care reviewed with the patient. All questions answered to the best of our ability. 
  
Disposition:  Anticipate discharge home after the completion of chemotherapy on Saturday. She is scheduled for labs/replacements on 3/30 and follow up on 4/2. Goals and plan of care reviewed with the patient. All questions answered to the best of our ability. Walter Cade NP [facility] Hematology & Oncology [address] [place] [address] Office : [phone] Fax : [phone]

## 2020-03-27 NOTE — DISCHARGE SUMMARY
[facility] Hematology & Oncology: Inpatient Hematology / Oncology Discharge Summary Note Patient ID: Glenora Gowers [id number] 
45 y.o. 
1945 Admit Date: 3/23/2020 Discharge Date: 3/28/2020 Admission Diagnoses: Admission for antineoplastic chemotherapy [Z51.11] Discharge Diagnoses: 
Principal Diagnosis: <principal problem not specified> Active Problems: 
  Admission for antineoplastic chemotherapy (5/5/2019) Hospital Course: Ms. Elijah Anderson is a 76 y.o. female admitted on 3/23/2020 with a primary diagnosis of AML. She failed induction with  7+3/Midostaurin, Decitabine/Gilteritinib x 3, and FLAG-VENITA in 11/2019. She was most recently treated with FLAG in 12/2019. BMbx in 1/2020 with CR.  She is being admitted for consolidation with intermediate dose Arabella-Ca at 1.5 mg/m² . She has tolerate treatment extremely well. She did experience some GERD symptoms, much improved with protonix. She will conitnue on protonix BID-RX sent. She already has all of her follow up appointments made. She knows to call with any fevers or other concerning symptoms. Consults: 
None Pertinent Diagnostic Studies:  
Labs:   
Recent Labs  
  03/27/20 
0405 03/26/20 
0223 03/25/20 
0304 WBC 2.1* 1.6* 4.0* HGB 8.4* 8.5* 8.5* PLT 91* 91* 85* ANEU 1.3* 1.3* 2.5 Recent Labs  
  03/27/20 
0405 03/26/20 
0223 03/25/20 
0304  142 144  
K 3.9 4.2 4.0  
* 109* 111* CO2 30 29 28 * 138* 135* BUN 15 12 15 CREA 0.57* 0.56* 0.60  
CA 9.1 9.2 9.0 SGOT 11* 12* 11* AP 85 89 90  
TP 5.9* 5.9* 5.8* ALB 3.4 3.2 3.0*  
MG 2.2 1.9 2.2 Current Discharge Medication List  
  
START taking these medications Details  
pantoprazole (PROTONIX) 40 mg tablet Take 1 Tab by mouth Before breakfast and dinner. Qty: 180 Tab, Refills: 0  
  
acyclovir (ZOVIRAX) 400 mg tablet Take 1 Tab by mouth two (2) times a day for 30 days. Qty: 60 Tab, Refills: 0 prednisoLONE acetate (PRED FORTE) 1 % ophthalmic suspension Administer 2 Drops to both eyes every six (6) hours. Qty: 15 mL, Refills: 1 Associated Diagnoses: Acute myeloid leukemia not having achieved remission ([address] CONTINUE these medications which have NOT CHANGED Details  
lidocaine-prilocaine (EMLA) topical cream Apply  to affected area as needed for Pain. Qty: 30 g, Refills: 0  
  
zolpidem (AMBIEN) 10 mg tablet Take 1 Tab by mouth nightly as needed for Sleep. Max Daily Amount: 10 mg. 
Qty: 30 Tab, Refills: 0 Associated Diagnoses: Acute myeloid leukemia not having achieved remission ([address] Insomnia, unspecified type  
  
gabapentin (NEURONTIN) 100 mg capsule Take 2 Caps by mouth nightly. Qty: 60 Cap, Refills: 0  
  
potassium chloride (K-DUR, KLOR-CON) 20 mEq tablet Take 1 Tab by mouth daily. Qty: 30 Tab, Refills: 3  
  
voriconazole (VFEND) 200 mg tablet Take 1 Tab by mouth every twelve (12) hours. Qty: 60 Tab, Refills: 3 DULoxetine (CYMBALTA) 30 mg capsule Take 1 Cap by mouth daily. Qty: 30 Cap, Refills: 3 cholecalciferol (VITAMIN D3) 400 unit tab tablet Take 2 Tabs by mouth daily. Qty: 60 Tab, Refills: 0  
  
ondansetron hcl (ZOFRAN) 8 mg tablet Take 1 Tab by mouth every eight (8) hours as needed for Nausea. Qty: 90 Tab, Refills: 0  
  
vit C/E/zinc/lutein/zeaxanthin ([address] PO) Take 1 Tab by mouth daily. LORazepam (ATIVAN) 1 mg tablet Take  by mouth nightly. acetaminophen 500 mg tab 500 mg, diphenhydrAMINE 25 mg cap 25 mg Take  by mouth nightly. pravastatin (PRAVACHOL) 10 mg tablet Take  by mouth nightly. OBJECTIVE: 
Patient Vitals for the past 8 hrs: 
 BP Temp Pulse Resp SpO2  
20 1101 131/72 98.7 °F (37.1 °C) 66 18 98 % 20 0827 135/58 98.5 °F (36.9 °C) 75 18 99 % Temp (24hrs), Av.4 °F (36.9 °C), Min:98 °F (36.7 °C), Max:98.7 °F (37.1 °C) 
 
701 -  1900 In: 1400 [P.O.:840; I.V.:560] Out: 1800 [Urine:1800] Physical Exam: 
 
 
ASSESSMENT: 
 
Active Problems: 
  Admission for antineoplastic chemotherapy (5/5/2019) DISPOSITION: 
Follow-up Appointments Procedures  FOLLOW UP VISIT Appointment in: Other (Specify) Labs/replacements on 3/30, follow up on 4/2 Labs/replacements on 3/30, follow up on 4/2 Standing Status:   Standing Number of Occurrences:   1 Order Specific Question:   Appointment in Answer: Other (Specify) Kelly White NP [facility] Hematology & Oncology [address] [place] [address] Office : [phone] Fax : [phone]

## 2020-03-27 NOTE — PROGRESS NOTES
END OF SHIFT NOTE Cycle 2 HIDAC S+ 4 Day 5 Patient receiving 5th dose of Cytarabine 2895 mg over three hours. No neurological changes noted. Patient has a good appetite with no fevers. Intake/Output 
03/27 0701 - 03/27 1900 In: 1698 [P.O.:840; I.V.:858] Out: 2000 [REYHE:3717] Voiding: Yes Catheter:  No  
Drain:   
 
 
 
 
 
Stool:   No  occurrences. Stool Assessment Stool Color: Brown(PER PATIENT'S REPORT ) (03/26/20 0855) Stool Appearance: Formed; Soft (03/26/20 0855) Stool Amount: Large (03/26/20 0855) Stool Source/Status: Rectum (03/26/20 0855) Emesis:   No  occurrences. VITAL SIGNS Patient Vitals for the past 12 hrs: 
 Temp Pulse Resp BP SpO2  
03/27/20 1516 97.9 °F (36.6 °C) 75 18 137/71 97 % 03/27/20 1101 98.7 °F (37.1 °C) 66 18 131/72 98 % 03/27/20 0827 98.5 °F (36.9 °C) 75 18 135/58 99 % Pain Assessment Pain 1 Pain Scale 1: Numeric (0 - 10) (03/27/20 1328) Pain Intensity 1: 0 (03/27/20 1328) Patient Stated Pain Goal: 0 (03/26/20 0110) Pain Reassessment 1: Yes (03/25/20 0420) Pain Onset 1: \"just started\" per pt (03/25/20 0321) Pain Location 1: Head (03/25/20 0321) Pain Orientation 1: Other (comment)(\"whole head \" per pt) (03/25/20 0321) Pain Description 1: Aching (03/25/20 0321) Pain Intervention(s) 1: Medication (see MAR); Environmental changes; Rest (03/25/20 0321) Ambulating Yes in room Additional Information:  See above Shift report given to oncoming nurse at the bedside.  
 
Patience Rodriguez, RN

## 2020-03-28 NOTE — PROGRESS NOTES
0256 cytarabine started to infuse over 3 hours thru port with positive blood return noted. Neuro assessment is normal.  0558 cytarabine infused with out problem, positive blood return noted from port.

## 2020-03-28 NOTE — PROGRESS NOTES
Problem: Falls - Risk of 
Goal: *Absence of Falls Description: Document Isela Ochoa Fall Risk and appropriate interventions in the flowsheet. Outcome: Progressing Towards Goal 
Note: Fall Risk Interventions: 
Mobility Interventions: Communicate number of staff needed for ambulation/transfer, Patient to call before getting OOB, Strengthening exercises (ROM-active/passive) Mentation Interventions: Adequate sleep, hydration, pain control, Evaluate medications/consider consulting pharmacy, Update white board Medication Interventions: Teach patient to arise slowly, Evaluate medications/consider consulting pharmacy Elimination Interventions: Toilet paper/wipes in reach, Toileting schedule/hourly rounds, Call light in reach, Patient to call for help with toileting needs History of Falls Interventions: Consult care management for discharge planning, Evaluate medications/consider consulting pharmacy Problem: Patient Education: Go to Patient Education Activity Goal: Patient/Family Education Outcome: Progressing Towards Goal

## 2020-03-28 NOTE — PROGRESS NOTES
Pt is for discharge home today with no needs/supportive care orders recieved for CM at this time. Care Management Interventions PCP Verified by CM: Yes Mode of Transport at Discharge: Self Transition of Care Consult (CM Consult): Discharge Planning Discharge Durable Medical Equipment: No 
Physical Therapy Consult: No 
Occupational Therapy Consult: No 
Speech Therapy Consult: No 
Current Support Network: Own Home, Family Lives Nearby, Lives with Spouse The Plan for Transition of Care is Related to the Following Treatment Goals : no needs The Patient and/or Patient Representative was Provided with a Choice of Provider and Agrees with the Discharge Plan?: Yes Name of the Patient Representative Who was Provided with a Choice of Provider and Agrees with the Discharge Plan: pt 
Freedom of Choice List was Provided with Basic Dialogue that Supports the Patient's Individualized Plan of Care/Goals, Treatment Preferences and Shares the Quality Data Associated with the Providers?: Yes [employer] Information Provided?: No 
Discharge Location Discharge Placement: Home

## 2020-03-28 NOTE — DISCHARGE INSTRUCTIONS
DISCHARGE SUMMARY from Nurse    PATIENT INSTRUCTIONS:    After general anesthesia or intravenous sedation, for 24 hours or while taking prescription Narcotics:  · Limit your activities  · Do not drive and operate hazardous machinery  · Do not make important personal or business decisions  · Do  not drink alcoholic beverages  · If you have not urinated within 8 hours after discharge, please contact your surgeon on call. Report the following to your surgeon:  · Excessive pain, swelling, redness or odor of or around the surgical area  · Temperature over 100.5  · Nausea and vomiting lasting longer than 4 hours or if unable to take medications  · Any signs of decreased circulation or nerve impairment to extremity: change in color, persistent  numbness, tingling, coldness or increase pain  · Any questions    What to do at Home:  Recommended activity:  As tolerated   If you experience any of the following symptoms Dr. Abdelrahman Patterson  *  Please give a list of your current medications to your Primary Care Provider. *  Please update this list whenever your medications are discontinued, doses are      changed, or new medications (including over-the-counter products) are added. *  Please carry medication information at all times in case of emergency situations. These are general instructions for a healthy lifestyle:    No smoking/ No tobacco products/ Avoid exposure to second hand smoke  Surgeon General's Warning:  Quitting smoking now greatly reduces serious risk to your health.     Obesity, smoking, and sedentary lifestyle greatly increases your risk for illness    A healthy diet, regular physical exercise & weight monitoring are important for maintaining a healthy lifestyle    You may be retaining fluid if you have a history of heart failure or if you experience any of the following symptoms:  Weight gain of 3 pounds or more overnight or 5 pounds in a week, increased swelling in our hands or feet or shortness of breath while lying flat in bed. Please call your doctor as soon as you notice any of these symptoms; do not wait until your next office visit. The discharge information has been reviewed with the patient The patient  verbalized understanding. Discharge medications reviewed with the patient and appropriate educational materials and side effects teaching were provided.   ___________________________________________________________________________________________________________________________________

## 2020-03-30 NOTE — PROGRESS NOTES
Arrived to infusion center. Port accessed and labs drawn. Critical values documented, covered under current orders. WBC 0.8, HGB 8.4, PLT 52, K 3.2. Augustine Vines NP notified of K 3.2, communicated to this RN to instruct patient to increase K-Dur home medication to 2 tab/day. Patient verbalizes understanding and aware of next appointment 4/2 at 10:10 am. No other issues reported this visit. Patient discharged ambulatory to home.

## 2020-04-02 NOTE — PROGRESS NOTES
4/2/20:  Patient in for follow-up with NP after hospital discharge last week. Patient reports weakness and fatigue but otherwise doing well at home. Hgb and platelets borderline and anticipate drop so will change infusion schedule to Sunday with blood and platelets. Patient continuing prophylactic antibiotics and antivirals. She will return for labs and replacements on Sunday and NP again on 4/9. Dr. Fabian Christensen on 4/14.

## 2020-04-05 NOTE — PROGRESS NOTES
Arrived to the [facility]. Transfusion completed. Patient tolerated well. Any issues or concerns during appointment: None. Patient aware of next infusion appointment on 4/9 (date) at 0930 (time). Discharged via wheelchair in stable condition.

## 2020-04-09 NOTE — PROGRESS NOTES
Arrived to infusion center. Premeds,1 unit PLT, and 1 unit PRBC given for PLT of 15 and Hgb 6.8. No complications noted, VSS throughout. Patient aware of next appointment 4/14 at 9:30am. Discharged ambulatory to home.

## 2020-04-09 NOTE — PROGRESS NOTES
4/9/20:  Patient saw NP in the office. This navigator met with the patient via virtual visit secondary to covid-19 precautions. Patient reports feeling well but struggling with constipation this week. She also noted a blood tinged taste in her mouth this am.  Labs reviewed and patient to receive blood and platelets today in infusion. She will call for fever or chills. Patient to return on 4/14 to see MD and discuss next steps for cycle #3.

## 2020-04-17 NOTE — PROGRESS NOTES
Arrived to the [facility]. Port accessed with no blood return. Patient stuck peripherally to [facility] with a butterfly needle to obtain blood work for labs. Hgb resulted 7.0. One unit PRBCs transfused per orders. Any issues or concerns during appointment:  Cath-mango instilled into port due to no blood return. After approximately an hour, + BR obtained. Patient aware of next infusion appointment on 4/20 (date) at 8:00 AM (time). Discharged ambulatory.

## 2020-04-30 NOTE — PROGRESS NOTES
TRANSFER - IN REPORT: 
 
Verbal report received from Alean Goodpasture, RN(name) on Kimberly Jones  being received from [facility](unit) for ordered procedure Report consisted of patients Situation, Background, Assessment and  
Recommendations(SBAR). Information from the following report(s) SBAR, Intake/Output, MAR and Recent Results was reviewed with the receiving nurse. Opportunity for questions and clarification was provided. Assessment completed upon patients arrival to unit and care assumed.

## 2020-04-30 NOTE — H&P
Inpatient Hematology / Oncology History and Physical 
 
Reason for Asmission:  Admission for antineoplastic chemotherapy [Z51.11] History of Present Illness: Ms. Jair Pruitt is a 76 y.o. female admitted on 4/30/2020 with a primary diagnosis of The encounter diagnosis was Acute myeloid leukemia not having achieved remission ([facility] .   
 
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 Subsequently on Decitabine plus Gilteritinib x 3 cycles w/ persistent disease. S/p FLAG-VENITA 11/19 w responding however persistent disease. Re-induction w modified FLAG 12/19 w CR. Now on IDAC and tolerating well. Seen in office today 4/30/2020: Here for cycle 3 IDAC, white count improved, thrombocytopenia stable at 67,000. Reports feeling well, denies any recent fevers or chills. Regular with her prophylactic antibiotics. Will proceed with hospitalization for cycle 3. Twice weekly labs with blood transfusions as needed. Tox check within a week of discharge. Will get BM biopsy around day 30 or count recovery. Plan for Vidaza/venetoclax thereafter. Review of Systems: As mentioned above. All other systems reviewed in full and are unremarkable. No Known Allergies Past Medical History:  
Diagnosis Date  AML (acute myeloid leukemia) ([facility] dx- 4/2019  
 followed by dr Dimitri Brittle  Depression  Hypercholesterolemia  Infection   
 of port -- was placed 5/2019-- removed 6/2019---right chest  
 Psychiatric disorder   
 aniexty  Sleep apnea Past Surgical History:  
Procedure Laterality Date  HX OTHER SURGICAL    
 colonoscopy  HX VASCULAR ACCESS    
 IR INSERT TUNL CVC W PORT OVER 5 YEARS  4/30/2019  IR INSERT TUNL CVC W PORT OVER 5 YEARS  7/15/2019  IR REMOVE TUNL CVAD W PORT/PUMP  6/13/2019 Family History Problem Relation Age of Onset  Cancer Father Social History Socioeconomic History  Marital status:   
 Spouse name: Not on file  Number of children: Not on file  Years of education: Not on file  Highest education level: Not on file Occupational History  Not on file Social Needs  Financial resource strain: Not on file  Food insecurity Worry: Not on file Inability: Not on file  Transportation needs Medical: Not on file Non-medical: Not on file Tobacco Use  Smoking status: Never Smoker  Smokeless tobacco: Never Used Substance and Sexual Activity  Alcohol use: Never Frequency: Never  Drug use: Never  Sexual activity: Not on file Lifestyle  Physical activity Days per week: Not on file Minutes per session: Not on file  Stress: Not on file Relationships  Social connections Talks on phone: Not on file Gets together: Not on file Attends Baptism service: Not on file Active member of club or organization: Not on file Attends meetings of clubs or organizations: Not on file Relationship status: Not on file  Intimate partner violence Fear of current or ex partner: Not on file Emotionally abused: Not on file Physically abused: Not on file Forced sexual activity: Not on file Other Topics Concern [address] Service Not Asked  Blood Transfusions Not Asked  Caffeine Concern Not Asked  Occupational Exposure Not Asked [place] Hazards Not Asked  Sleep Concern Not Asked  Stress Concern Not Asked  Weight Concern Not Asked  Special Diet Not Asked  Back Care Not Asked  Exercise Not Asked  Bike Helmet Not Asked  Seat Belt Not Asked  Self-Exams Not Asked Social History Narrative  Not on file Current Facility-Administered Medications Medication Dose Route Frequency Provider Last Rate Last Dose  acetaminophen/diphenhydrAMINE (TYLENOL PM EXT STR) 500/25 mg   Oral QHS Agustina Carbajal NP      
 acyclovir (ZOVIRAX) tablet 400 mg  400 mg Oral BID Agustina Carbajal NP  [START ON 5/1/2020] cholecalciferol (VITAMIN D3) (400 Units /10 mcg) tablet 2 Tab  800 Units Oral DAILY Sandro Wynne NP      
 [START ON 5/1/2020] DULoxetine (CYMBALTA) capsule 30 mg  30 mg Oral DAILY Sandro Wynne NP      
 gabapentin (NEURONTIN) capsule 200 mg  200 mg Oral QHS Sandro Wynne NP      
 lidocaine-prilocaine (EMLA) 2.5-2.5 % cream   Topical PRN Sandro Wynne NP      
 LORazepam (ATIVAN) tablet 1 mg  1 mg Oral QHS Sandro Wynne NP      
 ondansetron (ZOFRAN ODT) tablet 8 mg  8 mg Oral Q8H PRN Sandro Wynne NP      
 pantoprazole (PROTONIX) tablet 40 mg  40 mg Oral ACB&D Sandro Wynne NP   40 mg at 04/30/20 1630  
 [START ON 5/1/2020] potassium chloride (K-DUR, KLOR-CON) SR tablet 20 mEq  20 mEq Oral DAILY Sandro Wynne NP      
 pravastatin (PRAVACHOL) tablet 10 mg  10 mg Oral QHS Sandro Wynne NP      
 voriconazole (VFEND) tablet 200 mg  200 mg Oral Q12H Sandro Wynne NP      
 zolpidem (AMBIEN) tablet 10 mg  10 mg Oral QHS PRN Sandro Wynne NP      
 enoxaparin (LOVENOX) injection 40 mg  40 mg SubCUTAneous Q24H Sandro Wynne NP      
 prednisoLONE acetate (PRED FORTE) 1 % ophthalmic suspension 2 Drop  2 Drop Both Eyes Q6H Sandro Wynne NP      
 ondansetron (ZOFRAN) injection 8 mg  8 mg IntraVENous Q12H Sandro Wynne NP      
 dexamethasone (DECADRON) 4 mg/mL injection 4 mg  4 mg IntraVENous Q12H Sandro Wynne NP      
 cytarabine (CYTOSAR) 2,895 mg in 0.9% sodium chloride 250 mL chemo infusion  1.5 g/m2 (Treatment Plan Recorded) IntraVENous Q12H Sandro Wynne NP      
 [START ON 5/2/2020] ondansetron (ZOFRAN) injection 8 mg  8 mg IntraVENous Q12H Sandro Wynne NP      
 [START ON 5/2/2020] dexamethasone (DECADRON) 4 mg/mL injection 4 mg  4 mg IntraVENous Q12H Sandro Wynne NP      
 [START ON 5/2/2020] cytarabine (CYTOSAR) 2,895 mg in 0.9% sodium chloride 250 mL chemo infusion  1.5 g/m2 (Treatment Plan Recorded) IntraVENous Q12H Sandro Wynne NP      
  [START ON 2020] ondansetron (ZOFRAN) injection 8 mg  8 mg IntraVENous Q12H Perfecto Maclachlan, NP      
 [START ON 2020] dexamethasone (DECADRON) 4 mg/mL injection 4 mg  4 mg IntraVENous Q12H Perfecto Maclachlan, NP      
 [START ON 2020] cytarabine (CYTOSAR) 2,895 mg in 0.9% sodium chloride 250 mL chemo infusion  1.5 g/m2 (Treatment Plan Recorded) IntraVENous Q12H Perfecto Maclachlan, NP      
 sodium chloride 0.9 % bolus infusion 500 mL  500 mL IntraVENous PRN Perfecto Maclachlan, NP      
 diphenhydrAMINE (BENADRYL) injection 25 mg  25 mg IntraVENous PRN Perfecto Maclachlan, NP      
 famotidine (PF) (PEPCID) 20 mg in 0.9% sodium chloride 10 mL injection  20 mg IntraVENous PRN Perfecto Maclachlan, NP      
 acetaminophen (TYLENOL) tablet 650 mg  650 mg Oral PRN Perfecto Maclachlan, NP      
 meperidine (DEMEROL) injection 25 mg  25 mg IntraVENous PRN Perfecto Maclachlan, NP      
 ondansetron (ZOFRAN) injection 8 mg  8 mg IntraVENous PRN Perfecto Maclachlan, NP      
 sodium chloride 0.9 % bolus infusion 500 mL  500 mL IntraVENous PRN Perfecto Maclachlan, NP      
 EPINEPHrine HCl (PF) (ADRENALIN) 1 mg/mL (1 mL) injection 0.3 mg  0.3 mg SubCUTAneous PRN Perfecto Maclachlan, NP      
 hydrocortisone Sod Succ (PF) (SOLU-CORTEF) injection 100 mg  100 mg IntraVENous PRN Perfecto Maclachlan, NP      
 diphenhydrAMINE (BENADRYL) injection 50 mg  50 mg IntraVENous PRN Perfecto Maclachlan, NP      
 albuterol (PROVENTIL VENTOLIN) nebulizer solution 2.5 mg  2.5 mg Inhalation PRN Perfecto Maclachlan, NP      
 heparin (porcine) pf 500 Units  500 Units InterCATHeter PRN Ricardo Beal MD   500 Units at 20 1458 OBJECTIVE: 
Patient Vitals for the past 8 hrs: 
 BP Temp Pulse Resp SpO2  
20 1519 129/85 98.3 °F (36.8 °C) 93 18 98 % 20 1518     95 % Temp (24hrs), Av.4 °F (36.9 °C), Min:98.3 °F (36.8 °C), Max:98.4 °F (36.9 °C) No intake/output data recorded.  
 
Physical Exam: 
Constitutional: Well developed, well nourished female in no acute distress, sitting comfortably in the hospital bed. HEENT: Normocephalic and atraumatic. Oropharynx is clear, mucous membranes are moist.  Pupils are equal, round, and reactive to light. Extraocular muscles are intact. Sclerae anicteric. Neck supple without JVD. No thyromegaly present. Lymph node No palpable submandibular, cervical, supraclavicular, axillary or inguinal lymph nodes. Skin Warm and dry. No bruising and no rash noted. No erythema. No pallor. Respiratory Lungs are clear to auscultation bilaterally without wheezes, rales or rhonchi, normal air exchange without accessory muscle use. CVS Normal rate, regular rhythm and normal S1 and S2. No murmurs, gallops, or rubs. Abdomen Soft, nontender and nondistended, normoactive bowel sounds. No palpable mass. No hepatosplenomegaly. Neuro Grossly nonfocal with no obvious sensory or motor deficits. MSK Normal range of motion in general.  No edema and no tenderness. Psych Appropriate mood and affect. Labs:   
Recent Results (from the past 24 hour(s)) CBC WITH AUTOMATED DIFF Collection Time: 04/30/20 10:52 AM  
Result Value Ref Range WBC 5.7 4.3 - 11.1 K/uL  
 RBC 2.86 (L) 4.05 - 5.25 M/uL HGB 9.2 (L) 11.7 - 15.4 g/dL HCT 28.0 (L) 35.8 - 46.3 % MCV 97.9 (H) 79.6 - 97.8 FL  
 MCH 32.2 26.1 - 32.9 PG  
 MCHC 32.9 31.4 - 35.0 g/dL RDW 22.4 (H) 11.9 - 14.6 % PLATELET 67 (L) 529 - 450 K/uL MPV 10.2 9.4 - 12.3 FL ABSOLUTE NRBC 0.03 0.0 - 0.2 K/uL NEUTROPHILS 40 (L) 43 - 78 % LYMPHOCYTES 5 (L) 13 - 44 % MONOCYTES 52 (H) 4.0 - 12.0 % EOSINOPHILS 0 (L) 0.5 - 7.8 % BASOPHILS 0 0.0 - 2.0 % IMMATURE GRANULOCYTES 3 0.0 - 5.0 %  
 ABS. NEUTROPHILS 2.3 1.7 - 8.2 K/UL  
 ABS. LYMPHOCYTES 0.3 (L) 0.5 - 4.6 K/UL  
 ABS. MONOCYTES 3.0 (H) 0.1 - 1.3 K/UL  
 ABS. EOSINOPHILS 0.0 0.0 - 0.8 K/UL  
 ABS. BASOPHILS 0.0 0.0 - 0.2 K/UL  
 ABS. IMM. GRANS. 0.2 0.0 - 0.5 K/UL  
 DF AUTOMATED METABOLIC PANEL, COMPREHENSIVE Collection Time: 04/30/20 10:52 AM  
Result Value Ref Range Sodium 140 136 - 145 mmol/L Potassium 4.2 3.5 - 5.1 mmol/L Chloride 107 98 - 107 mmol/L  
 CO2 27 21 - 32 mmol/L Anion gap 6 (L) 7 - 16 mmol/L Glucose 85 65 - 100 mg/dL BUN 13 8 - 23 MG/DL Creatinine 0.73 0.6 - 1.0 MG/DL  
 GFR est AA >60 >60 ml/min/1.73m2 GFR est non-AA >60 >60 ml/min/1.73m2 Calcium 9.6 8.3 - 10.4 MG/DL Bilirubin, total 0.4 0.2 - 1.1 MG/DL  
 ALT (SGPT) 20 12 - 65 U/L  
 AST (SGOT) 16 15 - 37 U/L Alk. phosphatase 131 50 - 136 U/L Protein, total 7.1 6.3 - 8.2 g/dL Albumin 3.7 3.2 - 4.6 g/dL Globulin 3.4 2.3 - 3.5 g/dL A-G Ratio 1.1 (L) 1.2 - 3.5 MAGNESIUM Collection Time: 04/30/20 10:52 AM  
Result Value Ref Range Magnesium 2.2 1.8 - 2.4 mg/dL Imaging: No images are attached to the encounter. ASSESSMENT: 
Problem List  Date Reviewed: 4/14/2020 Codes Class Noted Febrile neutropenia (HCC) ICD-10-CM: D70.9, R50.81 ICD-9-CM: 288.00, 780.61  9/21/2019 Pancytopenia due to antineoplastic chemotherapy [facility]) ICD-10-CM: D61.810, T45.1X5A 
ICD-9-CM: 284.11, E933.1  6/12/2019 Cellulitis of neck ICD-10-CM: H59.614 ICD-9-CM: 682.1  6/12/2019 Immunocompromised status associated with infection ICD-10-CM: B99.9 ICD-9-CM: 136.9  6/12/2019 Port or reservoir infection ICD-10-CM: V85.711X ICD-9-CM: 999.33  6/12/2019 Acute myeloid leukemia not having achieved remission ([address] ICD-10-CM: C92.00 ICD-9-CM: 205.00  5/9/2019 Admission for antineoplastic chemotherapy ICD-10-CM: Z51.11 ICD-9-CM: V58.11  5/5/2019 AML (acute myeloblastic leukemia) ([facility] ICD-10-CM: C92.00 ICD-9-CM: 205.00  4/28/2019 Weakness generalized ICD-10-CM: R53.1 ICD-9-CM: 780.79  4/28/2019 Pancytopenia ([facility] ICD-10-CM: P88.500 ICD-9-CM: 284.19  4/28/2019 Thrombocytopenia ([facility] ICD-10-CM: D69.6 ICD-9-CM: 287.5  4/27/2019 ASSESSMENT: 
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 Subsequently on Decitabine plus Gilteritinib x 3 cycles w/ persistent disease. S/p FLAG-VENITA 11/19 w responding however persistent disease. Re-induction w modified FLAG 12/19 w CR. Now on IDAC and tolerating well. Seen in office today 4/30/2020: Here for cycle 3 IDAC, white count improved, thrombocytopenia stable at 67,000. Reports feeling well, denies any recent fevers or chills. Regular with her prophylactic antibiotics. Will proceed with hospitalization for cycle 3. Twice weekly labs with blood transfusions as needed. Tox check within a week of discharge. Will get BM biopsy around day 30 or count recovery. Plan for Vidaza/venetoclax thereafter. 
 
  
 
1. 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 On Decitabine plus Gilteritinib x 3 cycles w/ persistent disease. S/p FLAG-VENITA 11/19 w responding however persistent disease. Re-induction w modified FLAG 12/19 w CR. 2. H/o Port infection/bacteremia. PLAN: 
- As above. On IDAC admit for C3. Gentle hydration. Plan for 3 cycles, thereafter switch to Venetoclax/Azacitadine.  
- Continue prophylactic antibiotics w/ vori, acyclovir. Off levaquin for now. - Labs twice weekly w transfusion as needed once restarted on [facility] 
- Not interested in ASCT Labs 2x weekly w transfusions as needed. RTC in 1 week w labs 
  
 
Lab studies and imaging studies (CT/CXR) were personally reviewed. Pertinent old records were reviewed. Thank you for allowing us to participate in the care of Ms. Baudilio Dodge. Bean Saavedra MD 
[address] Office : [phone] Fax : [phone]

## 2020-04-30 NOTE — PROGRESS NOTES
4/30/20:  Patient in for follow-up and pre-chemo visit with Dr. Lina Matt. Patient doing well. Counts continuing to improve. Platelets at 67. Dr. Lina Matt would like to proceed with treatment as planned. Report called to Marlin bravo RN. Patient to have labs and replacements twice a week with weekly provider visits after discharge.

## 2020-04-30 NOTE — PROGRESS NOTES
Problem: Falls - Risk of 
Goal: *Absence of Falls Description: Document [place] Flow Fall Risk and appropriate interventions in the flowsheet. Outcome: Progressing Towards Goal 
Note: Fall Risk Interventions: 
Mobility Interventions: Communicate number of staff needed for ambulation/transfer Mentation Interventions: Adequate sleep, hydration, pain control Medication Interventions: Assess postural VS orthostatic hypotension, Teach patient to arise slowly Elimination Interventions: Call light in reach, Toileting schedule/hourly rounds Problem: Patient Education: Go to Patient Education Activity Goal: Patient/Family Education Outcome: Progressing Towards Goal

## 2020-05-01 NOTE — PROGRESS NOTES
Care Management Interventions PCP Verified by CM: Yes Mode of Transport at Discharge: Other (see comment) Transition of Care Consult (CM Consult): Other Current Support Network: Lives with Spouse Confirm Follow Up Transport: Family The Patient and/or Patient Representative was Provided with a Choice of Provider and Agrees with the Discharge Plan?: Yes Freedom of Choice List was Provided with Basic Dialogue that Supports the Patient's Individualized Plan of Care/Goals, Treatment Preferences and Shares the Quality Data Associated with the Providers?: Yes Discharge Location Discharge Placement: Home with family assistance Visited with pt regarding plans for discharge, pt lives at home with spouse, inde with ADL's. Pt is here for Cycle 3 of IDAC. Plans to d/c home with spouse, PPD order placed. CM will continue to follow.

## 2020-05-01 NOTE — PROGRESS NOTES
END OF SHIFT NOTE: 
 
Intake/Output No intake/output data recorded. Voiding: YES Catheter: NO 
Drain:   
 
 
 
 
 
Stool:  0 occurrences. Emesis:  0 occurrences. VITAL SIGNS Patient Vitals for the past 12 hrs: 
 Temp Pulse Resp BP SpO2  
05/01/20 0504 97.6 °F (36.4 °C) 80 18 123/66 93 % 04/30/20 2216 97.6 °F (36.4 °C) 82 18 125/71 96 % 04/30/20 2000 98.2 °F (36.8 °C) 82 18 115/67 96 % Pain Assessment Pain 1 Pain Scale 1: Numeric (0 - 10) (05/01/20 0504) Pain Intensity 1: 0 (05/01/20 0504) Patient Stated Pain Goal: 0 (04/30/20 1606) Ambulating Yes Additional Information: pt had an uneventful night. HiDAC hung after cathflo administered for blood return. No other issues or concerns. Report given to AM nurse. Shift report given to oncoming nurse at the bedside.  
 
Bianka Thomas RN

## 2020-05-01 NOTE — PROGRESS NOTES
Problem: Falls - Risk of 
Goal: *Absence of Falls Description: Document [place] Flow Fall Risk and appropriate interventions in the flowsheet. Outcome: Progressing Towards Goal 
Note: Fall Risk Interventions: 
Mobility Interventions: Communicate number of staff needed for ambulation/transfer Mentation Interventions: Adequate sleep, hydration, pain control, Update white board, Increase mobility Medication Interventions: Teach patient to arise slowly Elimination Interventions: Call light in reach History of Falls Interventions: Door open when patient unattended Problem: Patient Education: Go to Patient Education Activity Goal: Patient/Family Education Outcome: Progressing Towards Goal

## 2020-05-01 NOTE — PROGRESS NOTES
[facility] Hematology & Oncology Inpatient Hematology / Oncology Progress Note Admission Date: 2020  1:55 PM 
Reason for Admission/Hospital Course: Admission for antineoplastic chemotherapy [Z51.11] 24 Hour Events: 
Cycle 3 Day 2 Sitting up in bedside chair Completes on Tuesday In good spirits ROS: 
Constitutional: negative for fever, chills, weakness, malaise, fatigue. CV: negative for chest pain, palpitations, edema. Respiratory: negative for dyspnea, cough, wheezing. GI: negative for nausea, abdominal pain, diarrhea. 10 point review of systems is otherwise negative with the exception of the elements mentioned above in the HPI. No Known Allergies OBJECTIVE: 
Patient Vitals for the past 8 hrs: 
 BP Temp Pulse Resp SpO2  
20 0816 110/58 98 °F (36.7 °C) 84 18 94 % 20 0504 123/66 97.6 °F (36.4 °C) 80 18 93 % Temp (24hrs), Av °F (36.7 °C), Min:97.6 °F (36.4 °C), Max:98.4 °F (36.9 °C) 
 
 0701 -  1900 In: 240 [P.O.:240] Out: - Physical Exam: 
Constitutional: Well developed, well nourished female in no acute distress, sitting comfortably in the hospital bedside chair HEENT: Normocephalic and atraumatic. Oropharynx is clear, mucous membranes are moist.  Pupils are equal, round, and reactive to light. Extraocular muscles are intact. Sclerae anicteric. Skin Warm and dry. No bruising and no rash noted. No erythema. No pallor. Respiratory Lungs are clear to auscultation bilaterally without wheezes, rales or rhonchi, normal air exchange without accessory muscle use. CVS Normal rate, regular rhythm and normal S1 and S2. No murmurs, gallops, or rubs. Abdomen Soft, nontender and nondistended, normoactive bowel sounds. Neuro Grossly nonfocal with no obvious sensory or motor deficits. MSK Normal range of motion in general.  No edema and no tenderness. Psych Appropriate mood and affect. Labs: 
   
Recent Labs 05/01/20 
3934 04/30/20 
1052 WBC 4.1* 5.7  
RBC 2.72* 2.86* HGB 8.7* 9.2* HCT 26.8* 28.0*  
MCV 98.5* 97.9*  
MCH 32.0 32.2 MCHC 32.5 32.9 RDW 22.6* 22.4* PLT 69* 67* GRANS 81* 40* LYMPH 4* 5*  
MONOS 9 52* EOS 0* 0*  
BASOS 0 0 IG 6* 3 DF AUTOMATED AUTOMATED ANEU 3.3 2.3 ABL 0.2* 0.3* ABM 0.4 3.0* ALEKSANDR 0.0 0.0 ABB 0.0 0.0 AIG 0.2 0.2 Recent Labs 05/01/20 
6774 04/30/20 
1052  140  
K 4.3 4.2 * 107 CO2 27 27 AGAP 6* 6*  
* 85 BUN 16 13 CREA 0.73 0.73 GFRAA >60 >60 GFRNA >60 >60  
CA 9.7 9.6 SGOT 13* 16  
 131  
TP 6.6 7.1 ALB 3.3 3.7 GLOB 3.3 3.4 AGRAT 1.0* 1.1*  
MG 2.1 2.2 Imaging: 
 
Medications: 
Current Facility-Administered Medications Medication Dose Route Frequency  acetaminophen/diphenhydrAMINE (TYLENOL PM EXT STR) 500/25 mg   Oral QHS  acyclovir (ZOVIRAX) tablet 400 mg  400 mg Oral BID  cholecalciferol (VITAMIN D3) (400 Units /10 mcg) tablet 2 Tab  800 Units Oral DAILY  DULoxetine (CYMBALTA) capsule 30 mg  30 mg Oral DAILY  gabapentin (NEURONTIN) capsule 200 mg  200 mg Oral QHS  lidocaine-prilocaine (EMLA) 2.5-2.5 % cream   Topical PRN  
 LORazepam (ATIVAN) tablet 1 mg  1 mg Oral QHS  ondansetron (ZOFRAN ODT) tablet 8 mg  8 mg Oral Q8H PRN  pantoprazole (PROTONIX) tablet 40 mg  40 mg Oral ACB&D  potassium chloride (K-DUR, KLOR-CON) SR tablet 20 mEq  20 mEq Oral DAILY  pravastatin (PRAVACHOL) tablet 10 mg  10 mg Oral QHS  voriconazole (VFEND) tablet 200 mg  200 mg Oral Q12H  
 zolpidem (AMBIEN) tablet 10 mg  10 mg Oral QHS PRN  
 enoxaparin (LOVENOX) injection 40 mg  40 mg SubCUTAneous Q24H  prednisoLONE acetate (PRED FORTE) 1 % ophthalmic suspension 2 Drop  2 Drop Both Eyes Q6H  
 cytarabine (CYTOSAR) 2,895 mg in 0.9% sodium chloride 250 mL chemo infusion  1.5 g/m2 (Treatment Plan Recorded) IntraVENous Q12H  [START ON 5/2/2020] ondansetron (ZOFRAN) injection 8 mg  8 mg IntraVENous Q12H  
 [START ON 5/2/2020] dexamethasone (DECADRON) 4 mg/mL injection 4 mg  4 mg IntraVENous Q12H  
 [START ON 5/2/2020] cytarabine (CYTOSAR) 2,895 mg in 0.9% sodium chloride 250 mL chemo infusion  1.5 g/m2 (Treatment Plan Recorded) IntraVENous Q12H  
 [START ON 5/4/2020] ondansetron (ZOFRAN) injection 8 mg  8 mg IntraVENous Q12H  
 [START ON 5/4/2020] dexamethasone (DECADRON) 4 mg/mL injection 4 mg  4 mg IntraVENous Q12H  
 [START ON 5/4/2020] cytarabine (CYTOSAR) 2,895 mg in 0.9% sodium chloride 250 mL chemo infusion  1.5 g/m2 (Treatment Plan Recorded) IntraVENous Q12H  
 heparin (porcine) pf 500 Units  500 Units InterCATHeter PRN  
 
 
 
ASSESSMENT: 
 
Problem List  Date Reviewed: 4/14/2020 Codes Class Noted Febrile neutropenia (HCC) ICD-10-CM: D70.9, R50.81 ICD-9-CM: 288.00, 780.61  9/21/2019 Pancytopenia due to antineoplastic chemotherapy [facility]) ICD-10-CM: D61.810, T45.1X5A 
ICD-9-CM: 284.11, E933.1  6/12/2019 Cellulitis of neck ICD-10-CM: G08.453 ICD-9-CM: 682.1  6/12/2019 Immunocompromised status associated with infection ICD-10-CM: B99.9 ICD-9-CM: 136.9  6/12/2019 Port or reservoir infection ICD-10-CM: I73.659W ICD-9-CM: 999.33  6/12/2019 Acute myeloid leukemia not having achieved remission ([facility] ICD-10-CM: C92.00 ICD-9-CM: 205.00  5/9/2019 Admission for antineoplastic chemotherapy ICD-10-CM: Z51.11 ICD-9-CM: V58.11  5/5/2019 AML [facility]acute myeloblastic leukemia) ([facility] ICD-10-CM: C92.00 ICD-9-CM: 205.00  4/28/2019 Weakness generalized ICD-10-CM: R53.1 ICD-9-CM: 780.79  4/28/2019 Pancytopenia ([facility] ICD-10-CM: L11.196 ICD-9-CM: 284.19  4/28/2019 Thrombocytopenia ([facility] ICD-10-CM: D69.6 ICD-9-CM: 287.5  4/27/2019 PLAN: 
AML FLT 3 + sp multiple lines of therapy admitted for cycle 3 IDAC then planning Venetoclax and Idelia Noon 5/1 C3D2 IDAC Alexander SOPs Supportive Care Goals and plan of care reviewed with the patient. All questions answered to the best of our ability. Completes chemo on Tuesday. Zarina Rodriguez NP [facility] Hematology & Oncology [address] [place] [address] Office : [phone] Fax : [phone] Attending Addendum: 
I have personally performed a face to face diagnostic evaluation on this patient. I have reviewed and agree with the care plan as documented by Zarina Rodriguez, N.P. 36 minutes were spent on patient care, including but not limited to, reviewing the chart and time with the patient and family, more than 50% of the time documented was spent in face-to-face contact with the patient and in the care of the patient on the floor/unit where the patient is located. My findings are as follows: She has AML, appears weak, heart rate regular without murmurs, abdomen is non-tender, bowel sounds are positive, we will continue chemotherapy as per protocol. Lilian Argueta MD 
 
 
[facility] Hematology/Oncology [address] [place] [address] Office : [phone] Fax : [phone]

## 2020-05-01 NOTE — PROGRESS NOTES
Initial visit with patient. Talked at length with patient regarding her anil and cancer journey. Also, talked of other significant life events. Prayer and support given. Steve Quarles M.Div.

## 2020-05-02 NOTE — PROGRESS NOTES
END OF SHIFT NOTE: 
 
Intake/Output 05/02 0701 - 05/02 1900 In: 620 [P.O.:620] Out: -   
Voiding: YES Catheter: NO 
Drain:   
 
 
 
 
 
Stool:  0 occurrences. Emesis:  0 occurrences. VITAL SIGNS Patient Vitals for the past 12 hrs: 
 Temp Pulse Resp BP SpO2  
05/02/20 1540 98.8 °F (37.1 °C) 77 16 115/54 95 % 05/02/20 1138 98.3 °F (36.8 °C) 82 18 101/61 96 % 05/02/20 0745 98.5 °F (36.9 °C) 62 16 127/74 94 % Pain Assessment Pain 1 Pain Scale 1: Numeric (0 - 10) (05/02/20 1427) Pain Intensity 1: 0 (05/02/20 1427) Patient Stated Pain Goal: 0 (05/02/20 1427) Pain Reassessment 1: Yes (05/02/20 1427) Ambulating Yes Additional Information: Pt walking the halls. Up and out of bed all day. Ready for D/C 5/5. No complaints noted at this time. Shift report given to Hadassah Nageotte, RN oncoming nurse at the bedside.  
 
Franki Lou RN

## 2020-05-02 NOTE — PROGRESS NOTES
Problem: Falls - Risk of 
Goal: *Absence of Falls Description: Document Deb Blood Fall Risk and appropriate interventions in the flowsheet. Outcome: Progressing Towards Goal 
Note: Fall Risk Interventions: 
Mobility Interventions: Communicate number of staff needed for ambulation/transfer, Patient to call before getting OOB Mentation Interventions: Adequate sleep, hydration, pain control Medication Interventions: Evaluate medications/consider consulting pharmacy, Patient to call before getting OOB, Teach patient to arise slowly Elimination Interventions: Call light in reach, Patient to call for help with toileting needs History of Falls Interventions: Evaluate medications/consider consulting pharmacy Problem: Patient Education: Go to Patient Education Activity Goal: Patient/Family Education Outcome: Progressing Towards Goal

## 2020-05-02 NOTE — PROGRESS NOTES
[facility] Hematology & Oncology Inpatient Hematology / Oncology Progress Note Admission Date: 2020  1:55 PM 
Reason for Admission/Hospital Course: Admission for antineoplastic chemotherapy [Z51.11] 24 Hour Events: 
Cycle 3 Day 3 Sitting up in bedside chair Completes on Tuesday Doing great, no complaints ROS: 
Constitutional: negative for fever, chills, weakness, malaise, fatigue. CV: negative for chest pain, palpitations, edema. Respiratory: negative for dyspnea, cough, wheezing. GI: negative for nausea, abdominal pain, diarrhea. 10 point review of systems is otherwise negative with the exception of the elements mentioned above in the HPI. No Known Allergies OBJECTIVE: 
Patient Vitals for the past 8 hrs: 
 BP Temp Pulse Resp SpO2  
20 0745 127/74 98.5 °F (36.9 °C) 62 16 94 % 20 0234 136/70 97.9 °F (36.6 °C) 71 16 94 % Temp (24hrs), Av.2 °F (36.8 °C), Min:97.9 °F (36.6 °C), Max:98.5 °F (36.9 °C) No intake/output data recorded. Physical Exam: 
Constitutional: Well developed, well nourished female in no acute distress, sitting comfortably in the hospital bedside chair HEENT: Normocephalic and atraumatic. Oropharynx is clear, mucous membranes are moist.  Extraocular muscles are intact. Sclerae anicteric. Skin Warm and dry. No bruising and no rash noted. No erythema. No pallor. Respiratory Lungs are clear to auscultation bilaterally without wheezes, rales or rhonchi, normal air exchange without accessory muscle use. CVS Normal rate, regular rhythm and normal S1 and S2. No murmurs, gallops, or rubs. Abdomen Soft, nontender and nondistended, normoactive bowel sounds. Neuro Grossly nonfocal with no obvious sensory or motor deficits. MSK Normal range of motion in general.  No edema and no tenderness. Psych Appropriate mood and affect. Labs: 
   
Recent Labs 20 
5427 20 
0305 20 
1052 WBC 4.7 4.1* 5.7  
RBC 2.51* 2.72* 2.86* HGB 8.1* 8.7* 9.2* HCT 24.8* 26.8* 28.0*  
MCV 98.8* 98.5* 97.9*  
MCH 32.3 32.0 32.2 MCHC 32.7 32.5 32.9 RDW 22.5* 22.6* 22.4* PLT 67* 69* 67* GRANS 63 81* 40* LYMPH 2* 4* 5*  
MONOS 29* 9 52* EOS 0* 0* 0*  
BASOS 0 0 0 IG 6* 6* 3 DF AUTOMATED AUTOMATED AUTOMATED ANEU 2.9 3.3 2.3 ABL 0.1* 0.2* 0.3* ABM 1.4* 0.4 3.0* ALEKSANDR 0.0 0.0 0.0 ABB 0.0 0.0 0.0 AIG 0.3 0.2 0.2 Recent Labs 05/02/20 
7560 05/01/20 
6111 04/30/20 
1052  141 140  
K 4.1 4.3 4.2  108* 107 CO2 27 27 27 AGAP 8 6* 6*  
* 130* 85 BUN 20 16 13 CREA 0.70 0.73 0.73 GFRAA >60 >60 >60 GFRNA >60 >60 >60  
CA 9.3 9.7 9.6 SGOT 12* 13* 16  
 116 131  
TP 6.3 6.6 7.1 ALB 3.3 3.3 3.7 GLOB 3.0 3.3 3.4 AGRAT 1.1* 1.0* 1.1*  
MG 2.1 2.1 2.2 Imaging: 
 
Medications: 
Current Facility-Administered Medications Medication Dose Route Frequency  acetaminophen/diphenhydrAMINE (TYLENOL PM EXT STR) 500/25 mg   Oral QHS  acyclovir (ZOVIRAX) tablet 400 mg  400 mg Oral BID  cholecalciferol (VITAMIN D3) (400 Units /10 mcg) tablet 2 Tab  800 Units Oral DAILY  DULoxetine (CYMBALTA) capsule 30 mg  30 mg Oral DAILY  gabapentin (NEURONTIN) capsule 200 mg  200 mg Oral QHS  lidocaine-prilocaine (EMLA) 2.5-2.5 % cream   Topical PRN  
 LORazepam (ATIVAN) tablet 1 mg  1 mg Oral QHS  ondansetron (ZOFRAN ODT) tablet 8 mg  8 mg Oral Q8H PRN  pantoprazole (PROTONIX) tablet 40 mg  40 mg Oral ACB&D  potassium chloride (K-DUR, KLOR-CON) SR tablet 20 mEq  20 mEq Oral DAILY  pravastatin (PRAVACHOL) tablet 10 mg  10 mg Oral QHS  voriconazole (VFEND) tablet 200 mg  200 mg Oral Q12H  
 zolpidem (AMBIEN) tablet 10 mg  10 mg Oral QHS PRN  
 enoxaparin (LOVENOX) injection 40 mg  40 mg SubCUTAneous Q24H  prednisoLONE acetate (PRED FORTE) 1 % ophthalmic suspension 2 Drop  2 Drop Both Eyes Q6H  
  ondansetron (ZOFRAN) injection 8 mg  8 mg IntraVENous Q12H  
 dexamethasone (DECADRON) 4 mg/mL injection 4 mg  4 mg IntraVENous Q12H  cytarabine (CYTOSAR) 2,895 mg in 0.9% sodium chloride 250 mL chemo infusion  1.5 g/m2 (Treatment Plan Recorded) IntraVENous Q12H  
 [START ON 5/4/2020] ondansetron (ZOFRAN) injection 8 mg  8 mg IntraVENous Q12H  
 [START ON 5/4/2020] dexamethasone (DECADRON) 4 mg/mL injection 4 mg  4 mg IntraVENous Q12H  
 [START ON 5/4/2020] cytarabine (CYTOSAR) 2,895 mg in 0.9% sodium chloride 250 mL chemo infusion  1.5 g/m2 (Treatment Plan Recorded) IntraVENous Q12H  
 heparin (porcine) pf 500 Units  500 Units InterCATHeter PRN  
 
 
 
ASSESSMENT: 
 
Problem List  Date Reviewed: 4/14/2020 Codes Class Noted Febrile neutropenia (HCC) ICD-10-CM: D70.9, R50.81 ICD-9-CM: 288.00, 780.61  9/21/2019 Pancytopenia due to antineoplastic chemotherapy [facility]) ICD-10-CM: D61.810, T45.1X5A 
ICD-9-CM: 284.11, E933.1  6/12/2019 Cellulitis of neck ICD-10-CM: B60.479 ICD-9-CM: 682.1  6/12/2019 Immunocompromised status associated with infection ICD-10-CM: B99.9 ICD-9-CM: 136.9  6/12/2019 Port or reservoir infection ICD-10-CM: Z25.295G ICD-9-CM: 999.33  6/12/2019 Acute myeloid leukemia not having achieved remission ([facility] ICD-10-CM: C92.00 ICD-9-CM: 205.00  5/9/2019 Admission for antineoplastic chemotherapy ICD-10-CM: Z51.11 ICD-9-CM: V58.11  5/5/2019 AML (acute myeloblastic leukemia) ([facility] ICD-10-CM: C92.00 ICD-9-CM: 205.00  4/28/2019 Weakness generalized ICD-10-CM: R53.1 ICD-9-CM: 780.79  4/28/2019 Pancytopenia ([facility] ICD-10-CM: Y26.581 ICD-9-CM: 284.19  4/28/2019 Thrombocytopenia ([facility] ICD-10-CM: D69.6 ICD-9-CM: 287.5  4/27/2019 PLAN: 
AML FLT 3 + sp multiple lines of therapy admitted for cycle 3 IDAC then planning Venetoclax and Brijesh Mary 5/1 C3D2 IDAC 
5/2 C3D3 IDAC, tolerating well, no complaints Alexander SOPs Supportive Care Goals and plan of care reviewed with the patient. All questions answered to the best of our ability. Completes chemo on Tuesday. ARM[facility] H[facility] [address] [place] [address] Office : [phone] Fax : [phone] Attending Addendum: 
I have personally performed a face to face diagnostic evaluation on this patient. I have reviewed and agree with the care plan as documented by Jo Song NHARLAN. 36 minutes were spent on patient care, including but not limited to, reviewing the chart and time with the patient and family, more than 50% of the time documented was spent in face-to-face contact with the patient and in the care of the patient on the floor/unit where the patient is located. My findings are as follows: She has AML, appears anxious, heart rate regular without murmurs, abdomen is non-tender, bowel sounds are positive, we will continue Cycle 3 of intermediate-dose Arabella-C as per protocol. MD Jason Stewart Hematology/Oncology [id number] [id number] [facility] Office : [phone] Fax : [phone]

## 2020-05-02 NOTE — PROGRESS NOTES
Reviewed notes prior to visit for spiritual concerns Calm Polite Prayer offered Qasim Hunter, staff

## 2020-05-03 NOTE — PROGRESS NOTES
2126 cytarabine infusion completed, port flushed patent with no blood return noted. 2304 cath mango instilled in to port. 2341 no blood return yet from port. 0105 aspirated 1 ml blood return still very positional, flushed with 20 ml normal saline. Will order another cath mango. 1083 cath mango instilled in to port per order. 0356 aspirated 10 ml blood return from port. 4487 neuro assessment normal cytarabine started to infuse over 3 hour, with positive blood return from port.

## 2020-05-03 NOTE — PROGRESS NOTES
Care Management Interventions PCP Verified by CM: Yes Mode of Transport at Discharge: Other (see comment) Transition of Care Consult (CM Consult): Other Current Support Network: Lives with Spouse Confirm Follow Up Transport: Family The Patient and/or Patient Representative was Provided with a Choice of Provider and Agrees with the Discharge Plan?: Yes Freedom of Choice List was Provided with Basic Dialogue that Supports the Patient's Individualized Plan of Care/Goals, Treatment Preferences and Shares the Quality Data Associated with the Providers?: Yes Discharge Location Discharge Placement: Home with family assistance 76 MF with AML. Lives at home with spouse,  Was independent with ADL's. Admitted for cycle 3 IDAC. Plans to d/c home with spouse. SW will continue to follow.

## 2020-05-03 NOTE — PROGRESS NOTES
END OF SHIFT NOTE: 
 
Intake/Output 05/03 0701 - 05/03 1900 In: 250 [P.O.:250] Out: 500 [Urine:500] Voiding: YES Catheter: NO 
Drain:   
 
 
 
 
 
Stool:  0 occurrences. Emesis:  0 occurrences. VITAL SIGNS Patient Vitals for the past 12 hrs: 
 Temp Pulse Resp BP SpO2  
05/03/20 1535 98.4 °F (36.9 °C) 83 18 145/73 96 % 05/03/20 1130 97.4 °F (36.3 °C) 93 18 119/70 94 % 05/03/20 0802 98.6 °F (37 °C) 76 16 138/61 95 % Pain Assessment Pain 1 Pain Scale 1: Numeric (0 - 10) (05/03/20 1524) Pain Intensity 1: 0 (05/03/20 1524) Patient Stated Pain Goal: 0 (05/03/20 0802) Pain Reassessment 1: Yes (05/02/20 1427) Ambulating Yes Additional Information: dressing changed to port today. Needle still needs changing 5/7. Patient tolerated chemo well toady. Up and about the floor throughout the day. Shift report to be given to oncoming nurse at the bedside.  
 
Mai Emery RN

## 2020-05-03 NOTE — PROGRESS NOTES
[facility] Inpatient Hematology / Oncology Progress Note Admission Date: 2020  1:55 PM 
Reason for Admission/Hospital Course: Admission for antineoplastic chemotherapy [Z51.11] 24 Hour Events: 
Cycle 3 Day 4 Sitting up in bedside chair Completes on Tuesday Doing great, no complaints ROS: 
Constitutional: negative for fever, chills, weakness, malaise, fatigue. CV: negative for chest pain, palpitations, edema. Respiratory: negative for dyspnea, cough, wheezing. GI: negative for nausea, abdominal pain, diarrhea. 10 point review of systems is otherwise negative with the exception of the elements mentioned above in the HPI. No Known Allergies OBJECTIVE: 
Patient Vitals for the past 8 hrs: 
 BP Temp Pulse Resp SpO2 Weight 20 0802 138/61 98.6 °F (37 °C) 76 16 95 %   
20 0521      188 lb 8 oz (85.5 kg) 20 0335 133/55 97.8 °F (36.6 °C) 61 16 93 %  Temp (24hrs), Av.3 °F (36.8 °C), Min:97.8 °F (36.6 °C), Max:98.8 °F (37.1 °C) No intake/output data recorded. Physical Exam: 
Constitutional: Well developed, well nourished female in no acute distress, sitting comfortably in the hospital bedside chair HEENT: Normocephalic and atraumatic. Oropharynx is clear, mucous membranes are moist.  Extraocular muscles are intact. Sclerae anicteric. Skin Warm and dry. No bruising and no rash noted. No erythema. No pallor. Respiratory Lungs are clear to auscultation bilaterally without wheezes, rales or rhonchi, normal air exchange without accessory muscle use. CVS Normal rate, regular rhythm and normal S1 and S2. No murmurs, gallops, or rubs. Abdomen Soft, nontender and nondistended, normoactive bowel sounds. Neuro Grossly nonfocal with no obvious sensory or motor deficits. MSK Normal range of motion in general.  No edema and no tenderness. Psych Appropriate mood and affect. Labs: 
   
Recent Labs 05/03/20 
9378 05/02/20 
1453 05/01/20 
1025 WBC 2.1* 4.7 4.1*  
RBC 2.50* 2.51* 2.72* HGB 8.0* 8.1* 8.7* HCT 24.6* 24.8* 26.8*  
MCV 98.4* 98.8* 98.5*  
MCH 32.0 32.3 32.0 MCHC 32.5 32.7 32.5  
RDW 21.6* 22.5* 22.6* PLT 69* 67* 69* GRANS 82* 63 81* LYMPH 2* 2* 4*  
MONOS 9 29* 9  
EOS 0* 0* 0*  
BASOS 0 0 0 IG 7* 6* 6*  
DF AUTOMATED AUTOMATED AUTOMATED ANEU 1.8 2.9 3.3 ABL 0.0* 0.1* 0.2* ABM 0.2 1.4* 0.4 ALEKSANDR 0.0 0.0 0.0 ABB 0.0 0.0 0.0 AIG 0.1 0.3 0.2 Recent Labs 05/03/20 
4322 05/02/20 
5960 05/01/20 
3607  142 141  
K 4.4 4.1 4.3  107 108* CO2 28 27 27 AGAP 6* 8 6*  
* 137* 130* BUN 20 20 16 CREA 0.70 0.70 0.73 GFRAA >60 >60 >60 GFRNA >60 >60 >60  
CA 9.1 9.3 9.7 SGOT 10* 12* 13* AP 97 102 116  
TP 6.2* 6.3 6.6 ALB 3.3 3.3 3.3 GLOB 2.9 3.0 3.3 AGRAT 1.1* 1.1* 1.0*  
MG 1.9 2.1 2.1 Imaging: 
 
Medications: 
Current Facility-Administered Medications Medication Dose Route Frequency  acetaminophen/diphenhydrAMINE (TYLENOL PM EXT STR) 500/25 mg   Oral QHS  acyclovir (ZOVIRAX) tablet 400 mg  400 mg Oral BID  cholecalciferol (VITAMIN D3) (400 Units /10 mcg) tablet 2 Tab  800 Units Oral DAILY  DULoxetine (CYMBALTA) capsule 30 mg  30 mg Oral DAILY  gabapentin (NEURONTIN) capsule 200 mg  200 mg Oral QHS  lidocaine-prilocaine (EMLA) 2.5-2.5 % cream   Topical PRN  
 LORazepam (ATIVAN) tablet 1 mg  1 mg Oral QHS  ondansetron (ZOFRAN ODT) tablet 8 mg  8 mg Oral Q8H PRN  pantoprazole (PROTONIX) tablet 40 mg  40 mg Oral ACB&D  potassium chloride (K-DUR, KLOR-CON) SR tablet 20 mEq  20 mEq Oral DAILY  pravastatin (PRAVACHOL) tablet 10 mg  10 mg Oral QHS  voriconazole (VFEND) tablet 200 mg  200 mg Oral Q12H  
 zolpidem (AMBIEN) tablet 10 mg  10 mg Oral QHS PRN  
 enoxaparin (LOVENOX) injection 40 mg  40 mg SubCUTAneous Q24H  prednisoLONE acetate (PRED FORTE) 1 % ophthalmic suspension 2 Drop  2 Drop Both Eyes Q6H  
 [START ON 5/4/2020] ondansetron (ZOFRAN) injection 8 mg  8 mg IntraVENous Q12H  
 [START ON 5/4/2020] dexamethasone (DECADRON) 4 mg/mL injection 4 mg  4 mg IntraVENous Q12H  
 [START ON 5/4/2020] cytarabine (CYTOSAR) 2,895 mg in 0.9% sodium chloride 250 mL chemo infusion  1.5 g/m2 (Treatment Plan Recorded) IntraVENous Q12H  
 heparin (porcine) pf 500 Units  500 Units InterCATHeter PRN  
 
 
 
ASSESSMENT: 
 
Problem List  Date Reviewed: 4/14/2020 Codes Class Noted Febrile neutropenia (HCC) ICD-10-CM: D70.9, R50.81 ICD-9-CM: 288.00, 780.61  9/21/2019 Pancytopenia due to antineoplastic chemotherapy [facility]) ICD-10-CM: D61.810, T45.1X5A 
ICD-9-CM: 284.11, E933.1  6/12/2019 Cellulitis of neck ICD-10-CM: C99.332 ICD-9-CM: 682.1  6/12/2019 Immunocompromised status associated with infection ICD-10-CM: B99.9 ICD-9-CM: 136.9  6/12/2019 Port or reservoir infection ICD-10-CM: K64.088M ICD-9-CM: 999.33  6/12/2019 Acute myeloid leukemia not having achieved remission ([facility] ICD-10-CM: C92.00 ICD-9-CM: 205.00  5/9/2019 Admission for antineoplastic chemotherapy ICD-10-CM: Z51.11 ICD-9-CM: V58.11  5/5/2019 AML (acute myeloblastic leukemia) ([facility] ICD-10-CM: C92.00 ICD-9-CM: 205.00  4/28/2019 Weakness generalized ICD-10-CM: R53.1 ICD-9-CM: 780.79  4/28/2019 Pancytopenia ([facility] ICD-10-CM: R68.696 ICD-9-CM: 284.19  4/28/2019 Thrombocytopenia ([facility] ICD-10-CM: D69.6 ICD-9-CM: 287.5  4/27/2019 PLAN: 
AML FLT 3 + sp multiple lines of therapy admitted for cycle 3 IDAC then planning Venetoclax and Royal Fix 5/1 C3D2 IDAC 
5/2 C3D3 IDAC, tolerating well, no complaints 5/3 C3D4 IDAC, doing well Alexander SOPs Supportive Care Goals and plan of care reviewed with the patient. All questions answered to the best of our ability. Completes chemo on Tuesday.   
 
     
 
Gopal Adam NP  
 [facility] [address] [place] [address] Office : [phone] Fax : [phone] Attending Addendum: 
I have personally performed a face to face diagnostic evaluation on this patient. I have reviewed and agree with the care plan as documented by Angie Le N.P. 36 minutes were spent on patient care, including but not limited to, reviewing the chart and time with the patient and family, more than 50% of the time documented was spent in face-to-face contact with the patient and in the care of the patient on the floor/unit where the patient is located. My findings are as follows: She has AML, appears anxious, heart rate regular without murmurs, abdomen is non-tender, bowel sounds are positive, we will continue Cycle 3 of intermediate-dose Arabella-C as per protocol. Luba Arora MD 
 
 
[facility] [address] [place] [address] Office : [phone] Fax : [phone]

## 2020-05-04 NOTE — DISCHARGE SUMMARY
[address] Hematology Oncology In Patient Hematology / Oncology Discharge Summary Note Patient ID: Hira Mendieta [id number] 
83 y.o. 
1945 Admit Date: 2020 Discharge Date: 2020 Admission Diagnoses: Admission for antineoplastic chemotherapy [Z51.11] Discharge Diagnoses: 
Principal Diagnosis: <principal problem not specified> Active Problems: 
  Admission for antineoplastic chemotherapy (2019) Hospital Course: Ms. Marilu Hughes is a 76 y.o. female admitted on 2020 with a primary diagnosis of acute myeloid leukemia.   
  
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 Subsequently on decitabine plus gilteritinib x 3 cycles w/ persistent disease. S/p FLAG-VENITA  with persistent disease. Re-induction with modified FLAG  and received a complete remission. Now on IDAC and tolerating well.  She was admitted for cycle 3 IDAC. She has tolerated very well without complaints. She is feeling well for discharge. She will continue twice weekly labs with blood transfusions as needed at discharge and she will be seen by a provider in the office within one week or sooner if needed. She will continue her prophylactic antibiotic/antifungal/antiviral medications. She knows to call with any fevers, uncontrolled side effects from treatment or any other worrisome/concerning symptoms. She will get BM biopsy around day 30 or count recovery. Plan for Vidaza/venetoclax thereafter. Consults: 
None Significant Diagnostic Studies:  
None No Known Allergies OBJECTIVE: 
Patient Vitals for the past 8 hrs: 
 BP Temp Pulse Resp SpO2  
20 0820 130/72 98.1 °F (36.7 °C) 75 18 98 % 20 0418 143/71 98.2 °F (36.8 °C) (!) 54 16 97 % Temp (24hrs), Av.2 °F (36.8 °C), Min:97.8 °F (36.6 °C), Max:98.4 °F (36.9 °C) 
 
 0701 -  1900 In: 224 [I.V.:224] Out: 450 [Urine:450] Physical Exam: 
Constitutional: Oriented to person, place, and time. Well-developed and well-nourished. HEENT: Normocephalic and atraumatic. Oropharynx is clear and moist.  
Conjunctivae and EOM are normal. No scleral icterus. Neck supple. Skin Warm and dry. No bruising and no rash noted. No erythema. No pallor. Respiratory Effort normal and breath sounds normal.  No respiratory distress. No wheezes. No rales. No tenderness. CVS Normal rate, regular rhythm and normal heart sounds. Exam reveals no gallop, no friction and no rub. No murmur heard. Abdomen Soft. Bowel sounds are normal. Exhibits no distension. There is no tenderness. There is no rebound and no guarding. Neuro No obvious focal deficits. MSK Normal range of motion. No edema and no tenderness. Psych Normal mood, affect, behavior, judgment and thought content. Labs:   
Recent Labs 05/05/20 
0406 05/04/20 
0651 05/03/20 
6713 WBC 1.3* 3.3* 2.1*  
RBC 2.49* 2.46* 2.50* HGB 8.1* 7.9* 8.0*  
HCT 24.2* 23.9* 24.6* MCV 97.2 97.2 98.4*  
MCH 32.5 32.1 32.0 MCHC 33.5 33.1 32.5  
RDW 21.2* 21.2* 21.6* PLT 64* 71* 69* GRANS 78 59 82* LYMPH 2* 1* 2*  
MONOS 10 33* 9  
EOS 0* 0* 0*  
BASOS 0 0 0 IG 11* 7* 7*  
DF AUTOMATED MANUAL AUTOMATED ANEU 1.0* 2.0 1.8 ABL 0.0* 0.0* 0.0* ABM 0.1 1.1 0.2 ALEKSANDR 0.0 0.0 0.0 ABB 0.0 0.0 0.0 AIG 0.1 0.2 0.1 Recent Labs 05/05/20 
0406 05/04/20 
3570 05/03/20 
7778  139 141  
K 4.1 4.1 4.4  105 107 CO2 28 29 28 AGAP 5* 5* 6*  
* 108* 140* BUN 22 23 20 CREA 0.65 0.63 0.70 GFRAA >60 >60 >60 GFRNA >60 >60 >60  
CA 8.9 9.0 9.1 SGOT 10* 8* 10* AP 95 91 97  
TP 6.2* 6.0* 6.2* ALB 3.3 3.2 3.3 GLOB 2.9 2.8 2.9 AGRAT 1.1* 1.1* 1.1*  
MG 1.8 1.9 1.9 ASSESSMENT: 
 
Active Problems: 
  Admission for antineoplastic chemotherapy (5/5/2019) Current Discharge Medication List  
  
START taking these medications Details prednisoLONE acetate (PRED FORTE) 1 % ophthalmic suspension Administer 2 Drops to both eyes every six (6) hours. Qty: 5 mL, Refills: 0 Associated Diagnoses: Acute myeloid leukemia not having achieved remission ([address] CONTINUE these medications which have NOT CHANGED Details  
acyclovir (ZOVIRAX) 400 mg tablet Take 400 mg by mouth two (2) times a day. zolpidem (AMBIEN) 10 mg tablet Take 1 Tab by mouth nightly as needed for Sleep. Max Daily Amount: 10 mg. 
Qty: 30 Tab, Refills: 0 Associated Diagnoses: Acute myeloid leukemia not having achieved remission ([address] Insomnia, unspecified type  
  
pantoprazole (PROTONIX) 40 mg tablet Take 1 Tab by mouth Before breakfast and dinner. Qty: 180 Tab, Refills: 0  
  
lidocaine-prilocaine (EMLA) topical cream Apply  to affected area as needed for Pain. Qty: 30 g, Refills: 0  
  
gabapentin (NEURONTIN) 100 mg capsule Take 2 Caps by mouth nightly. Qty: 60 Cap, Refills: 0  
  
potassium chloride (K-DUR, KLOR-CON) 20 mEq tablet Take 1 Tab by mouth daily. Qty: 30 Tab, Refills: 3  
  
voriconazole (VFEND) 200 mg tablet Take 1 Tab by mouth every twelve (12) hours. Qty: 60 Tab, Refills: 3 DULoxetine (CYMBALTA) 30 mg capsule Take 1 Cap by mouth daily. Qty: 30 Cap, Refills: 3  
  
ondansetron hcl (ZOFRAN) 8 mg tablet Take 1 Tab by mouth every eight (8) hours as needed for Nausea. Qty: 90 Tab, Refills: 0  
  
vit C/E/zinc/lutein/zeaxanthin ([address] PO) Take 1 Tab by mouth daily. LORazepam (ATIVAN) 1 mg tablet Take  by mouth nightly. acetaminophen 500 mg tab 500 mg, diphenhydrAMINE 25 mg cap 25 mg Take  by mouth nightly. pravastatin (PRAVACHOL) 10 mg tablet Take  by mouth nightly. PLAN: 
 
Follow-up Appointments Procedures  FOLLOW UP VISIT Appointment in: Other (Specify) Keep next scheduled appointments at the [facility]. Keep next scheduled appointments at the [facility]. Standing Status:   Standing Number of Occurrences:   1 Order Specific Question:   Appointment in Answer: Other (Specify) Nelson Gilbert  [address] Hematology Oncology [address] [place] [id number] [facility] Office : [phone] Attending Addendum: 
I have personally performed a face to face diagnostic evaluation on this patient. I have reviewed and agree with the care plan as documented by Mis Beltran N.P. Patient appears table, heart rate regular without murmurs, abdomen is non-tender, bowel sounds are positive. 76 female, well-known to me for her hematologic diagnosis of AML, FLT3 ITD +ve with NPM1 and TET2 mutation, failed induction with 7+3 and Midostaurin. Subsequently on Decitabine plus Gilteritinib x 3 cycles w/ persistent disease. S/p FLAG-VENITA 11/19 w responding however persistent disease. Re-induction w modified FLAG 12/19 w CR admitted for [facility] cycle 3. Tolerated therapy well. Continue prophylactic antibiotics including acyclovir and voriconazole. Will need twice weekly labs in outpatient setting with transfusions as needed. Outpt f/u as above. I spent 32 minutes on evaluation, management, counseling and discharge planning on patient. Sheila Acevedo MD 
[facility] Hematology/Oncology [address] [place] [id number] [facility] Office : [phone] Fax : [phone]

## 2020-05-04 NOTE — PROGRESS NOTES
END OF SHIFT NOTE: 7p ~ 7a Intake/Output 24hr I&O = -1190cc Voiding: YES Stool:  0 occurrences. Stool Assessment Stool Appearance: Soft (05/03/20 2015) Emesis:  0 occurrences. VITAL SIGNS Patient Vitals for the past 12 hrs: 
 Temp Pulse Resp BP SpO2  
05/04/20 0700 97.6 °F (36.4 °C) 71 16 140/74 95 % 05/04/20 0515 97.3 °F (36.3 °C) (!) 58 16 132/64 95 % 05/03/20 2356 98.2 °F (36.8 °C) 60 18 142/73 96 % 05/03/20 2015 97.6 °F (36.4 °C) 66 17 118/48 95 % Pain Assessment Pain 1 Pain Scale 1: Numeric (0 - 10) (05/04/20 0515) Pain Intensity 1: 0 (05/04/20 0515) Patient Stated Pain Goal: 0 (05/04/20 0515) Pain Reassessment 1: Yes (05/02/20 1427) Ambulating Yes Additional Information: Tolerating chemotherapy treatment with no immediate complications noted. No cerebellar deficits noted. Pt in good spirits. Continuing with plan of care. Shift report given to oncoming nurse at the bedside.  
 
Eris Mirza RN

## 2020-05-04 NOTE — PROGRESS NOTES
[facility] Inpatient Hematology / Oncology Progress Note Admission Date: 2020  1:55 PM 
Reason for Admission/Hospital Course: Admission for antineoplastic chemotherapy [Z51.11] 24 Hour Events: 
Cycle 3 Day 5, completes tomorrow Sitting up in bedside chair, in great spirits No complaints ROS: 
Constitutional: negative for fever, chills, weakness, malaise, fatigue. CV: negative for chest pain, palpitations, edema. Respiratory: negative for dyspnea, cough, wheezing. GI: negative for nausea, abdominal pain, diarrhea. 10 point review of systems is otherwise negative with the exception of the elements mentioned above in the HPI. No Known Allergies OBJECTIVE: 
Patient Vitals for the past 8 hrs: 
 BP Temp Pulse Resp SpO2  
20 0700 140/74 97.6 °F (36.4 °C) 71 16 95 % 20 0515 132/64 97.3 °F (36.3 °C) (!) 58 16 95 % Temp (24hrs), Av.8 °F (36.6 °C), Min:97.3 °F (36.3 °C), Max:98.4 °F (36.9 °C) No intake/output data recorded. Physical Exam: 
Constitutional: Well developed, well nourished female in no acute distress, sitting comfortably in the hospital bedside chair. HEENT: Normocephalic and atraumatic. Oropharynx is clear, mucous membranes are moist.  Extraocular muscles are intact. Sclerae anicteric. Skin Warm and dry. No bruising and no rash noted. No erythema. No pallor. Respiratory Lungs are clear to auscultation bilaterally without wheezes, rales or rhonchi, normal air exchange without accessory muscle use. CVS Normal rate, regular rhythm and normal S1 and S2. No murmurs, gallops, or rubs. Abdomen Soft, nontender and nondistended, normoactive bowel sounds. Neuro Grossly nonfocal with no obvious sensory or motor deficits. MSK Normal range of motion in general.  No edema and no tenderness. Psych Appropriate mood and affect. Labs: 
   
Recent Labs 20 
3779 20 
9343 20 
5968 WBC 3.3* 2.1* 4.7  
RBC 2.46* 2.50* 2.51* HGB 7.9* 8.0* 8.1* HCT 23.9* 24.6* 24.8* MCV 97.2 98.4* 98.8*  
MCH 32.1 32.0 32.3 MCHC 33.1 32.5 32.7 RDW 21.2* 21.6* 22.5*  
PLT 71* 69* 67* GRANS 59 82* 63  
LYMPH 1* 2* 2*  
MONOS 33* 9 29* EOS 0* 0* 0*  
BASOS 0 0 0 IG 7* 7* 6*  
DF MANUAL AUTOMATED AUTOMATED ANEU 2.0 1.8 2.9 ABL 0.0* 0.0* 0.1* ABM 1.1 0.2 1.4* ALEKSANDR 0.0 0.0 0.0 ABB 0.0 0.0 0.0 AIG 0.2 0.1 0.3 Recent Labs 05/04/20 
9543 05/03/20 
7281 05/02/20 
5485  141 142  
K 4.1 4.4 4.1  107 107 CO2 29 28 27 AGAP 5* 6* 8  
* 140* 137* BUN 23 20 20 CREA 0.63 0.70 0.70 GFRAA >60 >60 >60 GFRNA >60 >60 >60  
CA 9.0 9.1 9.3 SGOT 8* 10* 12* AP 91 97 102 TP 6.0* 6.2* 6.3 ALB 3.2 3.3 3.3 GLOB 2.8 2.9 3.0 AGRAT 1.1* 1.1* 1.1*  
MG 1.9 1.9 2.1 Imaging: 
None Medications: 
Current Facility-Administered Medications Medication Dose Route Frequency  acetaminophen/diphenhydrAMINE (TYLENOL PM EXT STR) 500/25 mg   Oral QHS  acyclovir (ZOVIRAX) tablet 400 mg  400 mg Oral BID  cholecalciferol (VITAMIN D3) (400 Units /10 mcg) tablet 2 Tab  800 Units Oral DAILY  DULoxetine (CYMBALTA) capsule 30 mg  30 mg Oral DAILY  gabapentin (NEURONTIN) capsule 200 mg  200 mg Oral QHS  lidocaine-prilocaine (EMLA) 2.5-2.5 % cream   Topical PRN  
 LORazepam (ATIVAN) tablet 1 mg  1 mg Oral QHS  ondansetron (ZOFRAN ODT) tablet 8 mg  8 mg Oral Q8H PRN  pantoprazole (PROTONIX) tablet 40 mg  40 mg Oral ACB&D  potassium chloride (K-DUR, KLOR-CON) SR tablet 20 mEq  20 mEq Oral DAILY  pravastatin (PRAVACHOL) tablet 10 mg  10 mg Oral QHS  voriconazole (VFEND) tablet 200 mg  200 mg Oral Q12H  
 zolpidem (AMBIEN) tablet 10 mg  10 mg Oral QHS PRN  
 enoxaparin (LOVENOX) injection 40 mg  40 mg SubCUTAneous Q24H  prednisoLONE acetate (PRED FORTE) 1 % ophthalmic suspension 2 Drop  2 Drop Both Eyes Q6H  
  ondansetron (ZOFRAN) injection 8 mg  8 mg IntraVENous Q12H  
 dexamethasone (DECADRON) 4 mg/mL injection 4 mg  4 mg IntraVENous Q12H  cytarabine (CYTOSAR) 2,895 mg in 0.9% sodium chloride 250 mL chemo infusion  1.5 g/m2 (Treatment Plan Recorded) IntraVENous Q12H  
 heparin (porcine) pf 500 Units  500 Units InterCATHeter PRN  
 
 
 
ASSESSMENT: 
 
Problem List  Date Reviewed: 4/14/2020 Codes Class Noted Febrile neutropenia (HCC) ICD-10-CM: D70.9, R50.81 ICD-9-CM: 288.00, 780.61  9/21/2019 Pancytopenia due to antineoplastic chemotherapy [facility]) ICD-10-CM: D61.810, T45.1X5A 
ICD-9-CM: 284.11, E933.1  6/12/2019 Cellulitis of neck ICD-10-CM: H60.671 ICD-9-CM: 682.1  6/12/2019 Immunocompromised status associated with infection ICD-10-CM: B99.9 ICD-9-CM: 136.9  6/12/2019 Port or reservoir infection ICD-10-CM: P50.801V ICD-9-CM: 999.33  6/12/2019 Acute myeloid leukemia not having achieved remission ([facility] ICD-10-CM: C92.00 ICD-9-CM: 205.00  5/9/2019 Admission for antineoplastic chemotherapy ICD-10-CM: Z51.11 ICD-9-CM: V58.11  5/5/2019 AML (acute myeloblastic leukemia) ([facility] ICD-10-CM: C92.00 ICD-9-CM: 205.00  4/28/2019 Weakness generalized ICD-10-CM: R53.1 ICD-9-CM: 780.79  4/28/2019 Pancytopenia ([facility] ICD-10-CM: V03.379 ICD-9-CM: 284.19  4/28/2019 Thrombocytopenia ([facility] ICD-10-CM: D69.6 ICD-9-CM: 287.5  4/27/2019 PLAN: 
AML FLT 3 + sp multiple lines of therapy admitted for cycle 3 IDAC then planning Venetoclax and Jullie Sit 5/1 C3D2 IDAC 
5/2 C3D3 IDAC, tolerating well, no complaints 5/4 C3D5 IDAC, doing well. No complaints, in great spirits. Plan to discharge tomorrow with labs/replacements on May 8 and office follow up with NP on May 11. Alexander SOPs Supportive Care Goals and plan of care reviewed with the patient. All questions answered to the best of our ability. Completes chemo on Tuesday. Roslyn Fierro NP [facility] Hematology & Oncology [address] [place] [address] Office : [phone] Fax : [phone] Attending Addendum: 
I have personally performed a face to face diagnostic evaluation on this patient. I have reviewed and agree with the care plan as documented by Anita Herman N.P. 36 minutes were spent on patient care, including but not limited to, reviewing the chart and time with the patient and family, more than 50% of the time documented was spent in face-to-face contact with the patient and in the care of the patient on the floor/unit where the patient is located. My findings are as follows: She has AML, appears anxious, heart rate regular without murmurs, abdomen is non-tender, bowel sounds are positive, we will continue Cycle 3 of intermediate-dose Arabella-C as per protocol. Roslyn Fierro NP [facility] Hematology/Oncology [address] [place] [address] Office : [phone] Fax : [phone] Attending Addendum: 
I have personally performed a face to face diagnostic evaluation on this patient. I have reviewed and agree with the care plan as documented above by  Amparo Shen N.P.  My findings are as follows: Patient appears lethargic, heart rate regular without murmurs, abdomen is non-tender, bowel sounds are positive. 76 female, well-known to me for her hematologic diagnosis of AML, FLT3 ITD +ve with NPM1 and TET2 mutation, failed induction with 7+3 and Midostaurin. Subsequently on Decitabine plus Gilteritinib x 3 cycles w/ persistent disease. S/p FLAG-VENITA 11/19 w responding however persistent disease. Re-induction w modified FLAG 12/19 w CR. Now hospitalized getting IDAC cycle 3. Continue gentle hydration. Tolerating therapy well. Monitor for toxicity. Continue prophylactic antibiotics including acyclovir and voriconazole.   Will need twice weekly labs in outpatient setting with transfusions as needed. Total time 35 min 50% in direct consultation about the patient's diagnosis and management Dennys Barrera MD 
[facility] Hematology/Oncology [address] [place] [address] Office : [phone] Fax : [phone]

## 2020-05-04 NOTE — PROGRESS NOTES
END OF SHIFT NOTE: 
 
Intake/Output 05/04 0701 - 05/04 1900 In: 12 [P.O.:462] Out: 4852 [VYDBL:6896] Voiding:yes Catheter: no 
Drain:   
Stool: 1 occurrences. Stool Assessment Stool Color: Doren Maicas (05/04/20 1452) Stool Appearance: Formed (05/04/20 1452) Stool Amount: Large (05/04/20 1452) Stool Source/Status: Rectum (05/04/20 1452) Emesis: 0 occurrences. VITAL SIGNS Patient Vitals for the past 12 hrs: 
 Temp Pulse Resp BP SpO2  
05/04/20 1452 98.2 °F (36.8 °C) 88 18 129/67 97 % 05/04/20 1153 98.4 °F (36.9 °C) 92 18 124/81 97 % 05/04/20 0700 97.6 °F (36.4 °C) 71 16 140/74 95 % Pain Assessment Pain 1 Pain Scale 1: Numeric (0 - 10) (05/04/20 0700) Pain Intensity 1: 0 (05/04/20 0700) Patient Stated Pain Goal: 0 (05/04/20 0700) Pain Reassessment 1: Yes (05/02/20 1427) Ambulating 
yes Additional Information:  
Pt d/c plan to go home tomorrow, pt sitting in chair in room with no questions or concerns at this time. Shift report given to oncoming nurse at the bedside. Lalito Perez

## 2020-05-05 NOTE — PROGRESS NOTES
Problem: Falls - Risk of 
Goal: *Absence of Falls Description: Document Eleazar Led Fall Risk and appropriate interventions in the flowsheet. Outcome: Progressing Towards Goal 
Note: Fall Risk Interventions: 
Mobility Interventions: Communicate number of staff needed for ambulation/transfer, PT Consult for assist device competence, PT Consult for mobility concerns, Patient to call before getting OOB Mentation Interventions: Adequate sleep, hydration, pain control, Room close to nurse's station Medication Interventions: Patient to call before getting OOB, Teach patient to arise slowly Elimination Interventions: Call light in reach, Patient to call for help with toileting needs History of Falls Interventions: Evaluate medications/consider consulting pharmacy, Room close to nurse's station

## 2020-05-05 NOTE — PROGRESS NOTES
End of Shift Note: 
 
Patient was given cathflo for her port. She has her last chemo treatment today and then is planning on being d/c to go home. Patient didn't have any questions or concerns throughout the shift. Shift report given to oncoming nurse.   
 
Fidelina Ospina RN

## 2020-05-06 NOTE — PROGRESS NOTES
Transition of Care Hospital Discharge Follow-Up      Date/Time:  2020 10:44 AM    Patient was admitted to [facility] on 20 and discharged on 20 for Chemo . The physician discharge summary was available at the time of outreach. Patient was contacted within 1 business days of discharge. Inpatient RUR score: 24   Was this a readmission? Yes, patient will likely have readmission for future treatment  Patient stated reason for the readmission: chemo/treatment    LPN Care Coordinator contacted the  by telephone to perform post hospital discharge assessment. Verified name and  with Mr. Sandee Salazar as identifiers. Provided introduction to self, and explanation of the Care Coordinator role. Patient received hospital discharge instructions. LPN reviewed discharge instructions and red flags with patient who verbalized understanding. Patient given an opportunity to ask questions and does not have any further questions or concerns at this time. The patient agrees to contact the PCP office for questions related to their healthcare. LPN provided contact information for future reference. Patients top risk factors for readmission:   treatment    [facility]  Date of initial or scheduled visit: n/a  (Assist with coordination of services if necessary.)    Durable Medical Equipment ordered at discharge: none  [address] received:   (Assist patient in obtaining DME orders &/or equipment if necessary.)    Medication(s):   Medications at Discharge:     Medication review was performed with patient, who verbalizes understanding of administration of home medications. There were no barriers to obtaining medications identified at this time. Current Outpatient Medications   Medication Sig    acyclovir (ZOVIRAX) 400 mg tablet Take 400 mg by mouth two (2) times a day.     prednisoLONE acetate (PRED FORTE) 1 % ophthalmic suspension Administer 2 Drops to both eyes every six (6) hours.  zolpidem (AMBIEN) 10 mg tablet Take 1 Tab by mouth nightly as needed for Sleep. Max Daily Amount: 10 mg.  pantoprazole (PROTONIX) 40 mg tablet Take 1 Tab by mouth Before breakfast and dinner.  lidocaine-prilocaine (EMLA) topical cream Apply  to affected area as needed for Pain.  gabapentin (NEURONTIN) 100 mg capsule Take 2 Caps by mouth nightly.  potassium chloride (K-DUR, KLOR-CON) 20 mEq tablet Take 1 Tab by mouth daily.  voriconazole (VFEND) 200 mg tablet Take 1 Tab by mouth every twelve (12) hours.  DULoxetine (CYMBALTA) 30 mg capsule Take 1 Cap by mouth daily.  cholecalciferol (VITAMIN D3) 400 unit tab tablet Take 2 Tabs by mouth daily.  ondansetron hcl (ZOFRAN) 8 mg tablet Take 1 Tab by mouth every eight (8) hours as needed for Nausea.  vit C/E/zinc/lutein/zeaxanthin (OCUVITE EYE HEALTH PO) Take 1 Tab by mouth daily.  LORazepam (ATIVAN) 1 mg tablet Take  by mouth nightly.  acetaminophen 500 mg tab 500 mg, diphenhydrAMINE 25 mg cap 25 mg Take  by mouth nightly.  pravastatin (PRAVACHOL) 10 mg tablet Take  by mouth nightly. No current facility-administered medications for this visit. There are no discontinued medications. ADL assessment: independent  (If patient is unable to perform ADLs - what is the limiting factor(s)? Do they have a support system that can assist? If no support system is present, discuss possible assistance that they may be able to obtain.  Escalate for SW if ongoing issues are verbalized by pt or anticipated)    [facility] follow up appointment(s):   Future Appointments   Date Time Provider Blade Hinton   5/8/2020  8:00 AM POD1A GCCOP[facility]   5/11/2020  8:00 AM [address] OUTREACH INSURANCE GCCOIG [place] [address] [place] [address]   5/11/2020  8:30 AM Ilsa Goodwin NP [facility]   5/11/2020  9:[facility]   5/14/2020  9:00 AM LAB CK GCCOPIG [facility] 5/14/2020  9:30 AM POD3C GCCOPIG GVL GCC   5/18/2020  8:30 AM GCC OUTREACH INSURANCE GCCOIG GVL GCC   5/18/2020  9:00 AM Navneet Momin NP [facility] BS   5/18/2020  9:30 AM POD3B GCCOPIG GVL [facility]   5/21/2020  7:30 AM LAB CK GCCOPIG GVL [facility]   5/21/2020  8:00 AM POD3A GCCOPIG GVL [facility]   5/26/2020  9:10 AM GCC OUTREACH INSURANCE GCCOIG GVL [address] [place] [address]   5/26/2020  9:30 AM Samuel Braga MD [facility]   5/26/2020 10:00 AM POD3A GCCOPIG GVL [facility]   5/29/2020  7:30 AM LAB CK GCCOPIG GVL [facility]   5/29/2020  8:00 AM POD2C GCCOPIG GVL [facility]   6/4/2020  1:00 PM [facility] OUTREACH INSURANCE GCCOIG GVL [address] [place] [address]   6/4/2020  1:30 PM Samuel Braga MD [facility]      Non-[facility] follow up appointment(s): following with oncology  7 Day follow up with PCP or Specialist:   Transportation arranged:     Any other questions or concerns expressed by patient? None at this time    Schedule next appointment with TOBY or referral to CTN/ ACM: n/a; Patient is followed closely by oncology nurse navigator  Next follow up call: Due to disease process patient will likely have unavoidable hospitalizations and medical interventions for the remainder of treatment; as well as patient and family knowledge of treatment and disease no further TOBY outreach is indicated. Education provided regarding infection prevention, and signs and symptoms of COVID-19 and when to seek medical attention with Mr. Claudia Renner who verbalized understanding. Discussed exposure protocols and quarantine from [address] you at higher risk for severe illness 2019 and given an opportunity for questions and concerns. The Mr. Claudia Renner agrees to contact the COVID-19 hotline [phone] or PCP office for questions related to their healthcare. [facility] provided contact information for future reference. From CDC: Are you at higher risk for severe illness?  Wash your hands often.  Avoid close contact (6 feet, which is about two arm lengths) with people who are sick.    Put distance between yourself and other people if COVID-19 is spreading in your community.  Clean and disinfect frequently touched surfaces.  Avoid all cruise travel and non-essential air travel.  Call your healthcare professional if you have concerns about COVID-19 and your underlying condition or if you are sick. For more information on steps you can take to protect yourself, see CDC's How to Protect Yourself      Patient/family/caregiver given information for GetWell Loop and agrees to enroll no  Patient's preferred e-mail:     Patient's preferred phone number:   Based on Loop alert triggers, patient will be contacted by nurse care manager for worsening symptoms.

## 2020-05-08 NOTE — CDMP QUERY
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 They are noted to have a history of pancytopenia due to chemotherapy with a date assigned of 06/12/2019. This admission, patient was noted to have a WBC 4.1, RBC 2.72 and platelet of 69 on admission. This dropped to a WBC of 1.3, RBC 2.49 and platelet of 64. Labs were monitored. After study, for this admission, was patient suspected to have: 
 
·Pancytopenia due to chemotherapy ·Pancytopenia unrelated to chemotherapy ·Abnormal lab findings only ·Other ·Unable to determine Risk factors: AML, Chemo Clinical indicators: WBC 4.1=>1.3, RBC 2.72=>2.49, Platelets 66=>90 Treatment: Monitoring of labs Thanks, Jennifer Guido, RN, BSN, CDS Clinical Documentation Improvement 
[phone]

## 2020-05-08 NOTE — PROGRESS NOTES
Arrived to the [facility]. Labs completed. Replacements not required Any issues or concerns during appointment: Dual port without blood return. After Cath mango dwell of 1 hour, medial port returned blood. Spoke with Karishma Ac NP, will give 1 unit PRBC on Monday. Patient aware of next infusion appointment on 5/11/20 (date) at 0900 (time). Discharged via [facility].

## 2020-05-11 NOTE — PROGRESS NOTES
5/11/20:  Patient in for follow-up with NPMatt. Patient weak and has to stand slowly because of dizziness. Patient otherwise doing well. Hgb 6.9, Platelets 8k today. ANC 0.3. Patient encouraged to continue acyclovir, vfend and re-start cipro. 1 unit of blood and platelets ordered for infusion today. Patient to return on Thursday for repeat labs and replacements. Patient to have bone marrow biopsy when counts recover ~5/29.

## 2020-05-11 NOTE — PROGRESS NOTES
Arrived to infusion center. Labs reviewed, Hgb 6.9 and PLT 8.  1 unit PRBC and 1 unit PLT given per orders. Tylenol and benadryl given for premedications. Tolerated both infusions well, with no complications and VSS throughout transfusions. No other replacements needed today. No new issues/complications this visit. Patient aware of next appt 5/14 at 0930. Discharged ambulatory to home.

## 2020-05-14 NOTE — PROGRESS NOTES
Arrived to the [facility]. Assessment complete, labs reviewed. No replacements required today per ordered protocol. Patient tolerated without problems. Any issues or concerns during appointment: WBC = 0.3, Marina Martins NP made aware, no new orders received. Pt educated on importance of good hand hygiene. Pt made aware to call if fever begins. Pt verbalized understanding. Patient aware of next infusion appointment on 5/18/2020 (date) at 0930 (time). Discharged ambulatory.
stated

## 2020-05-18 NOTE — PROGRESS NOTES
5/18/20:  Saw patient in infusion with NP, Josephine Diego. Patient pale and reports not feeling well today. Patient's hgb and platelets low today. Will get blood and platelets today. Patient reports that she vomited today after eating sweets for breakfast.  Patient drinking ginger ale and reports feeling some improvement. Patient continuing acyclovir, vfend and cipro while counts low. Plan is to repeat labs and possible replacements on Thursday. Patient to see Dr. Mikaela Parry next Tuesday for f/u. She will have bone marrow biopsy around 5/28.

## 2020-05-18 NOTE — PROGRESS NOTES
Arrived to the [facility]. Assessment complete, labs reviewed. 1 unit of blood and 1 unit of platelets completed. Patient tolerated without problems. Any issues or concerns during appointment: None. Patient aware of next infusion appointment on 5/21/20(date) at 8 AM (time). Discharged ambulatory

## 2020-05-21 NOTE — PROGRESS NOTES
Arrived to the [facility]. Assessment complete, labs reviewed. 1 unit of PRBC completed. Mg level 1.7 called to [facility] . Patient instructed to  prescription of Magnesium to begin at home   Patient tolerated without problems. Any issues or concerns during appointment: None. Patient aware of next infusion appointment on 5/26/20(date) at 10 AM (time). Discharged ambulatory

## 2020-05-26 NOTE — PROGRESS NOTES
5/26/20:  Patient in for follow-up with Dr. Jorgito Noriega. Patient doing well today and doesn't require blood products at this point. Dr. Jorgito Noriega discussed need to repeat bone marrow biopsy which is already scheduled. Patient to f/u with Dr. Jorgito Noriega next week to discuss bone marrow results. Dr. Jorgito Noriega also discussed need to start maintenance therapy consisting of vidaza and venclexta (reduced dose b/c of voriconazole). Patient to have labs prior to bone marrow this week.

## 2020-05-28 NOTE — PROGRESS NOTES
The patient was counseled at length about the risks of khoa Covid-19 during their perioperative period and any recovery window from their procedure. The patient was made aware that khoa Covid-19  may worsen their prognosis for recovering from their procedure and lend to a higher morbidity and/or mortality risk. All material risks, benefits, and reasonable alternatives including postponing the procedure were discussed. The patient does  wish to proceed with the procedure at this time.

## 2020-05-28 NOTE — PROCEDURES
Department of Interventional Radiology  [phone]        Interventional Radiology Brief Procedure Note    Patient: Shoshana Johnson MRN: [id number]  SSN: [id number]    YOB: 1945  Age: 76 y.o. Sex: female      Date of Procedure: 5/28/2020    Pre-Procedure Diagnosis: AML    Post-Procedure Diagnosis: SAME    Procedure(s): Image Guided Biopsy    Brief Description of Procedure: Right iliac bone marrow aspiration/biopsy    Performed By: Negro Bardales MD     Assistants: None    Anesthesia:Moderate Sedation    Estimated Blood Loss: Less than 10ml    Specimens:  Pathology    Implants:  None    Findings: Unremarkable.      Complications: None    Recommendations: 1 hour bedrest.      Follow Up: Dr Christina Alejandra    Signed By: Negro Bardales MD     May 28, 2020

## 2020-05-28 NOTE — PROGRESS NOTES
Arrived to infusion center. Labs drawn via port and sent for testing. PLT 55, no platelets needed today per orders. No new issues reported today, no complications this visit. Port flushed, discharged ambulatory to home with port need left intact for bone marrow biopsy later today.  Patient aware of next appt 6/4 at 2:30pm.

## 2020-05-28 NOTE — DISCHARGE INSTRUCTIONS
[address]  Department of Interventional Radiology  [facility]  [phone] Office  [phone] Fax    BIOPSY DISCHARGE INSTRUCTIONS    General Instructions:     A biopsy is the removal of a small piece of tissue for microscopic examination or testing. Healthy tissue can be obtained for the purpose of tissue-type matching for transplants. Unhealthy tissues are more commonly biopsied to diagnose disease. Lung Biopsy:     A needle lung biopsy is performed when there is a mass discovered in the lung area. The most serious risk is infection, bleeding, and pneumothorax (a collapsed lung). Signs of pneumothorax include shortness of breath, rapid heart rate, and blueness of the skin. If any of these occur, call 911. Liver Biopsy: This test helps detect cancer, infections, and the cause of an enlargement of the liver or elevated liver enzymes. It also helps to diagnose a number of liver diseases. The pain associated with the procedure may be felt in the shoulder. The risks include internal bleeding, pneumothorax, and injury to the surrounding organs. Renal Biopsy: This procedure is sometimes done to evaluate a transplanted kidney. It is also used to evaluate unexplained decrease in kidney function, blood, or protein in the urine. You may see bright red blood in the urine the first 24 hours after the test. If the bleeding lasts longer, you need to call your doctor. There is a risk of infection and bleeding into the muscle, which may cause soreness. Spinal Biopsy: This test is sometimes done in conjunction with other procedures. Your back will be sore, as we are taking a small sample of bone, which is slightly more difficult to sample than tissue. General Biopsy:     A mass can grow in any area of the body, and we are taking a specimen as ordered by your doctor. The risks are the same.  They include bleeding, pain, and infection. Home Care Instructions: You may resume your regular diet and medication regimen. Do not drink alcohol, drive, or make any important legal decisions in the next 24 hours. Do not lift anything heavier than a gallon of milk until the soreness goes away. You may use over the counter acetaminophen or ibuprofen for the soreness. You may apply an ice pack to the affected area for 20-30 minutes at time for the first 24 hours. After that, you may apply a heat pack. Call If: You should call your Physician and/or the Radiology Nurse if you have any questions or concerns about the biopsy site. Call if you should have increased pain, fever, redness, drainage, or bleeding more than a small spot on the bandage. Follow-Up Instructions: Please see your ordering doctor as he/she has requested. To Reach Us: Interventional Radiology General Nurse Discharge    After general anesthesia or intravenous sedation, for 24 hours or while taking prescription Narcotics:  · Limit your activities  · Do not drive and operate hazardous machinery  · Do not make important personal or business decisions  · Do  not drink alcoholic beverages  · If you have not urinated within 8 hours after discharge, please contact your surgeon on call. * Please give a list of your current medications to your Primary Care Provider. * Please update this list whenever your medications are discontinued, doses are     changed, or new medications (including over-the-counter products) are added. * Please carry medication information at all times in case of emergency situations. These are general instructions for a healthy lifestyle:    No smoking/ No tobacco products/ Avoid exposure to second hand smoke  Surgeon General's Warning:  Quitting smoking now greatly reduces serious risk to your health.     Obesity, smoking, and sedentary lifestyle greatly increases your risk for illness  A healthy diet, regular physical exercise & weight monitoring are important for maintaining a healthy lifestyle    You may be retaining fluid if you have a history of heart failure or if you experience any of the following symptoms:  Weight gain of 3 pounds or more overnight or 5 pounds in a week, increased swelling in our hands or feet or shortness of breath while lying flat in bed. Please call your doctor as soon as you notice any of these symptoms; do not wait until your next office visit. Recognize signs and symptoms of STROKE:  F-face looks uneven    A-arms unable to move or move unevenly    S-speech slurred or non-existent    T-time-call 911 as soon as signs and symptoms begin-DO NOT go       Back to bed or wait to see if you get better-TIME IS BRAIN. If you have any questions about your procedure, please call the Interventional Radiology department at [phone]. After business hours (5pm) and weekends, call the answering service at [phone] and ask for the Radiologist on call to be paged.          Patient Signature:  Date: 5/28/2020  Discharging Nurse: Charmayne Nones, RN

## 2020-05-28 NOTE — H&P
Department of Interventional Radiology  ([phone])    History and Physical    Patient:  Colette Lange MRN:  [id number]  SSN:  [id number]    YOB: 1945  Age:  76 y.o. Sex:  female      Primary Care Provider:  Clarice Chen MD  Referring Physician:  Kranthi Mcguire MD    Subjective:     Chief Complaint: AML    History of the Present Illness: The patient is a 76 y.o. female who presents for follow up BMBx.  NPO. No thinners. Past Medical History:   Diagnosis Date    AML (acute myeloid leukemia) ([facility] [address] dx- 4/2019    followed by dr Umm Jung    Depression     Hypercholesterolemia     Infection     of port -- was placed 5/2019-- removed 6/2019---right chest    Psychiatric disorder     aniexty    Sleep apnea      Past Surgical History:   Procedure Laterality Date    HX OTHER SURGICAL      colonoscopy    HX VASCULAR ACCESS      IR INSERT TUNL CVC W PORT OVER 5 YEARS  4/30/2019    IR INSERT TUNL CVC W PORT OVER 5 YEARS  7/15/2019    IR REMOVE TUNL CVAD W PORT/PUMP  6/13/2019        Review of Systems:    Pertinent items are noted in the History of Present Illness. Current Outpatient Medications   Medication Sig    magnesium oxide (MAG-OX) 400 mg tablet Take 1 Tab by mouth daily.  lidocaine-prilocaine (EMLA) topical cream Apply  to affected area as needed for Pain.  acyclovir (ZOVIRAX) 400 mg tablet Take 400 mg by mouth two (2) times a day.  zolpidem (AMBIEN) 10 mg tablet Take 1 Tab by mouth nightly as needed for Sleep. Max Daily Amount: 10 mg.  pantoprazole (PROTONIX) 40 mg tablet Take 1 Tab by mouth Before breakfast and dinner.  gabapentin (NEURONTIN) 100 mg capsule Take 2 Caps by mouth nightly.  potassium chloride (K-DUR, KLOR-CON) 20 mEq tablet Take 1 Tab by mouth daily.  voriconazole (VFEND) 200 mg tablet Take 1 Tab by mouth every twelve (12) hours.  DULoxetine (CYMBALTA) 30 mg capsule Take 1 Cap by mouth daily.     cholecalciferol (VITAMIN D3) 400 unit tab tablet Take 2 Tabs by mouth daily.  ondansetron hcl (ZOFRAN) 8 mg tablet Take 1 Tab by mouth every eight (8) hours as needed for Nausea.  LORazepam (ATIVAN) 1 mg tablet Take  by mouth nightly.  pravastatin (PRAVACHOL) 10 mg tablet Take  by mouth nightly.  Venclexta Starting Pack 10 mg-50 mg- 100 mg DsPk Take 1 (10mg) tab by mouth on day 1. Take 2 (10mg) tabs by mouth on day 2. Take 1 (50mg) tab by mouth on day 3. Take 1 (100mg) tab by mouth days 4-30. Indications: acute myeloid leukemia, a type of blood cancer    prednisoLONE acetate (PRED FORTE) 1 % ophthalmic suspension Administer 2 Drops to both eyes every six (6) hours.  vit C/E/zinc/lutein/zeaxanthin (OCUVITE EYE HEALTH PO) Take 1 Tab by mouth daily.  acetaminophen 500 mg tab 500 mg, diphenhydrAMINE 25 mg cap 25 mg Take  by mouth nightly.      Current Facility-Administered Medications   Medication Dose Route Frequency    lidocaine (XYLOCAINE) 20 mg/mL (2 %) injection  mg  2-10 mL IntraDERMal ONCE    midazolam (VERSED) injection 0.5-2 mg  0.5-2 mg IntraVENous Multiple    fentaNYL citrate (PF) injection 25-50 mcg  25-50 mcg IntraVENous Multiple     Facility-Administered Medications Ordered in Other Encounters   Medication Dose Route Frequency    0.9% sodium chloride infusion 250 mL  250 mL IntraVENous PRN    acetaminophen (TYLENOL) tablet 650 mg  650 mg Oral ONCE    diphenhydrAMINE (BENADRYL) capsule 25 mg  25 mg Oral Q6H PRN        Allergies   Allergen Reactions    Levaquin [Levofloxacin] Other (comments)     tendonitis       Family History   Problem Relation Age of Onset    Cancer Father      Social History     Tobacco Use    Smoking status: Never Smoker    Smokeless tobacco: Never Used   Substance Use Topics    Alcohol use: Never     Frequency: Never        Objective:       Physical Examination:    Vitals:    05/28/20 1417   BP: 133/82   Pulse: 84   Resp: 18   Temp: 98.3 °F (36.8 °C)   SpO2: 99%     Blood pressure 133/82, pulse 84, temperature 98.3 °F (36.8 °C), resp. rate 18, SpO2 99 %, not currently breastfeeding.   General:  alert, cooperative, no distress  Heart:  regular rate and rhythm, S1, S2 normal, no murmur, click, rub or gallop  Lungs:  clear to auscultation bilaterally  Abdomen:  nondistended, normal bowel sounds  Neuro:  normal without focal findings    Laboratory:     Lab Results   Component Value Date/Time    Sodium 140 05/28/2020 07:57 AM    Sodium 140 05/26/2020 07:45 AM    Potassium 4.2 05/28/2020 07:57 AM    Potassium 4.1 05/26/2020 07:45 AM    Chloride 108 (H) 05/28/2020 07:57 AM    Chloride 108 (H) 05/26/2020 07:45 AM    CO2 28 05/28/2020 07:57 AM    CO2 26 05/26/2020 07:45 AM    Anion gap 4 (L) 05/28/2020 07:57 AM    Anion gap 6 (L) 05/26/2020 07:45 AM    Glucose 127 (H) 05/28/2020 07:57 AM    Glucose 99 05/26/2020 07:45 AM    BUN 15 05/28/2020 07:57 AM    BUN 18 05/26/2020 07:45 AM    Creatinine 1.00 05/28/2020 07:57 AM    Creatinine 1.00 05/26/2020 07:45 AM    GFR est AA >60 05/28/2020 07:57 AM    GFR est AA >60 05/26/2020 07:45 AM    GFR est non-AA 58 (L) 05/28/2020 07:57 AM    GFR est non-AA 58 (L) 05/26/2020 07:45 AM    Calcium 9.1 05/28/2020 07:57 AM    Calcium 9.3 05/26/2020 07:45 AM    Magnesium 2.1 05/28/2020 07:57 AM    Magnesium 1.9 05/26/2020 07:45 AM    Phosphorus 3.0 12/10/2019 03:48 AM    Phosphorus 3.1 12/09/2019 04:34 AM    Albumin 3.5 05/28/2020 07:57 AM    Albumin 3.5 05/26/2020 07:45 AM    Protein, total 6.7 05/28/2020 07:57 AM    Protein, total 6.6 05/26/2020 07:45 AM    Globulin 3.2 05/28/2020 07:57 AM    Globulin 3.1 05/26/2020 07:45 AM    A-G Ratio 1.1 (L) 05/28/2020 07:57 AM    A-G Ratio 1.1 (L) 05/26/2020 07:45 AM    ALT (SGPT) 22 05/28/2020 07:57 AM    ALT (SGPT) 17 05/26/2020 07:45 AM     Lab Results   Component Value Date/Time    WBC 3.5 (L) 05/28/2020 02:25 PM    WBC 4.2 (L) 05/28/2020 07:57 AM    HGB 8.7 (L) 05/28/2020 02:25 PM    HGB 8.8 (L) 05/28/2020 07:57 AM HCT 26.4 (L) 05/28/2020 02:25 PM    HCT 26.5 (L) 05/28/2020 07:57 AM    PLATELET 47 (L) 78/25/1680 02:25 PM    PLATELET 55 (L) 80/03/1526 07:57 AM     Lab Results   Component Value Date/Time    aPTT 37.8 11/19/2019 02:53 AM    aPTT 29.6 11/17/2019 02:05 AM    Prothrombin time 17.7 (H) 11/19/2019 02:53 AM    Prothrombin time 15.6 (H) 11/17/2019 02:05 AM    INR 1.4 11/19/2019 02:53 AM    INR 1.2 11/17/2019 02:05 AM       Assessment:     AML    Plan:     Planned Procedure:  Bone Marrow Aspiration and Biopsy. Sedation. Risks, benefits, and alternatives reviewed with patient and she agrees to proceed with the procedure.       Signed By: Tc Rollins MD     May 28, 2020

## 2020-06-04 NOTE — PROGRESS NOTES
6/4/2020:  Patient in for labs and virtual visit with Dr. Jorgito Noriega. Dr. Jorgito Noriega reviewed labs and bone marrow biopsy results with the patient and discussed plan to start vidaza (5 days) on Monday 6/8. She will also start venclexta 10mg x 1 day, 20mg x 1 day, 50mg x 1 day and then stay on 100mg daily for 14 days on and 14 days off. Patient to start Ezequiel Healdton on 6/8 even if she doesn't have venclexta yet. Patient will continue with twice a week labs and replacements for at least first cycle. She will see NP in 2 weeks and Dr. Jorgito Noriega before next Vidaza cycle. VS today 98.6, HR 92, 98% RA and 123/73 18 respirations. Patient's port de-accessed.

## 2020-06-08 NOTE — PROGRESS NOTES
Arrived to the [facility]. D1C1 Vidaza infusion completed. Patient tolerated well. Any issues or concerns during appointment: NO.  Patient aware of next infusion appointment on 06/09/2020 (date) at [phone] (time). Discharged ambulatory.

## 2020-06-10 NOTE — PROGRESS NOTES
Arrived to the [facility]. Jeremi Nolan completed. Patient tolerated well. Any issues or concerns during appointment: none. Patient aware of next infusion appointment on 6/11/20 at 1530. Discharged ambulatory.     Liliana Zendejas RN

## 2020-06-11 NOTE — PROGRESS NOTES
Arrived to the [facility]. D4C1 Vidaza infusion completed. Patient tolerated well. Any issues or concerns during appointment: NO.  Patient aware of next infusion appointment on 06/12/2020 (date) at 1500 (time). Discharged ambulatory.

## 2020-06-12 NOTE — PROGRESS NOTES
Arrived to the [facility]. Cornelia Lopez completed. Patient tolerated well. Any issues or concerns during appointment: None. Patient aware of next infusion appointment on June 15th at 0900. Discharged ambulatory.

## 2020-06-15 NOTE — PROGRESS NOTES
,Arrived to the [facility]. Labs completed. No replacements needed. Patient requested for Ambien, Potassium and VFEND refill. KAM Hampton to follow up with request for refill. Patient aware of next infusion appointment on 6/18/2020 at 9:30 Discharged ambulatory to home.

## 2020-06-18 NOTE — PROGRESS NOTES
Arrived to the [facility]. Labs completed. Patient tolerated well. Any issues or concerns during appointment: none. No replacements needed. Patient aware of next infusion appointment on 6/22 (date) at 9:00 AM (time). Discharged ambulatory.

## 2020-06-22 NOTE — PROGRESS NOTES
6/22/20:  Patient in for follow-up with Dr. Mikaela Parry after starting Liss Pandy. Patient notes a rash on hands, neck and face that is itching and red. Dr. Mikaela Parry assessed the patient's rash and plan is to try triamcinolone cream for now. If rash worsens, she will call. Patient labs stable and hasn't required replacements in past week. Will now only need weekly labs and replacements. NP to see the patient prior to next cycle and to start venclexta at that time. Patient to see Mikaela Parry in about 6 weeks.

## 2020-06-29 NOTE — PROGRESS NOTES
Arrived to the [facility]. Labs completed. Patient tolerated well. No replacements needed per parameters. Advised patient to follow neutropenic precautions. Instructed patient to notify Dr Dejon Briscoe' office for any worrisome symptoms or concerns. She verbalized understanding. Any issues or concerns during appointment: denies Patient aware of next infusion appointment on  7/6/20 at 1200. Discharged ambulatory with self.

## 2020-07-06 NOTE — PROGRESS NOTES
7/6/20 - Patient here for follow up and for C2 vidaza. ANC 0.4 and will hold treatment for a week. Pt will not start venclexta at this time either. C2 rescheduled for week of 7/13. Dr. Pippa Roman would like ANC 1.2 and plts 75 before proceeding with treatment. Pt understands and new schedule given to patient.

## 2020-07-13 NOTE — PROGRESS NOTES
7/13/20:  Patient in for pre-chemo visit with NP.  ANC and plts still too low for treatment. Patient doing well at home. She has a mouth sore in the roof of her mouth from eating food at a hot temperature. Patient has noted low grade temps up to 100. Encouraged patient to call for elevated temperature 100.5 or greater. Patient to return in 1 week with labs and probable infusion. Venclexta to start with next vidaza cycle.

## 2020-07-20 NOTE — PROGRESS NOTES
7/20/20:  Patient in for follow-up with Dr. Tong Ruiz and to discuss start of next treatment cycle. ANC down to 0.2 and platelets down to 02Y. Patient feeling well but down about the labs. Dr. Tong Ruiz discussed need to repeat bone marrow biopsy to determine reason behind continued low counts and drop noted today. Patient to have labs and probable platelets on Friday 9/99 prior to bone marrow biopsy around noon. Patient then to have labs and replacements next week. She will see Dr. Tong Ruiz around 8/3 to discuss bone marrow biopsy results. If leukemia recurring, Dr. Tong Ruiz discussed possibility of changing treatment. Patient aware not to start venclexta for now.

## 2020-07-24 NOTE — PROGRESS NOTES
Recovery period without difficulty. Pt alert and oriented and denies pain. Dressing is clean, dry, and intact. Reviewed discharge instructions with patient and , both verbalized understanding. Pt escorted to lobby discharge area via wheelchair. Vital signs and Oscar score completed.

## 2020-07-24 NOTE — PROGRESS NOTES
IR Nurse Pre-Procedure Checklist Part 2          Consent signed: Yes    H&P complete:  Yes    Antibiotics: No    Airway/Mallampati Done: Yes    Shaved:  No    Pregnancy Form:No    Patient Position: Yes    MD Side: Yes     Biopsy Worksheet: No    Specimen Medium: No

## 2020-07-24 NOTE — H&P
Department of Interventional Radiology  [phone]    History and Physical    Patient:  Pavan Brown MRN:  [id number]  SSN:  [id number]    YOB: 1945  Age:  76 y.o. Sex:  female      Primary Care Provider:  Gray Davis MD  Referring Physician:  Radha Knight MD    Subjective:     Chief Complaint: biopsy    History of the Present Illness: The patient is a 76 y.o. female with AML who presents for biopsy. NPO. Platelet infusion this morning. Past Medical History:   Diagnosis Date    AML (acute myeloid leukemia) ([facility] [address] dx- 4/2019    followed by dr Beatriz Alexandra    Depression     Hypercholesterolemia     Infection     of port -- was placed 5/2019-- removed 6/2019---right chest    Psychiatric disorder     aniexty    Sleep apnea      Past Surgical History:   Procedure Laterality Date    HX OTHER SURGICAL      colonoscopy    HX VASCULAR ACCESS      IR INSERT TUNL CVC W PORT OVER 5 YEARS  4/30/2019    IR INSERT TUNL CVC W PORT OVER 5 YEARS  7/15/2019    IR REMOVE TUNL CVAD W PORT/PUMP  6/13/2019        Review of Systems:    Pertinent items are noted in the History of Present Illness. Prior to Admission medications    Medication Sig Start Date End Date Taking? Authorizing Provider   voriconazole (VFEND) 200 mg tablet Take 1 Tab by mouth every twelve (12) hours. 6/15/20  Yes Radha Knight MD   potassium chloride (K-DUR, KLOR-CON) 20 mEq tablet Take 1 Tab by mouth daily. 6/15/20  Yes Radha Knight MD   zolpidem (AMBIEN) 10 mg tablet Take 1 Tab by mouth nightly as needed for Sleep. Max Daily Amount: 10 mg. 6/15/20  Yes Radha Knight MD   gabapentin (NEURONTIN) 100 mg capsule Take 2 Caps by mouth nightly. 6/1/20  Yes Radha Knight MD   acyclovir (ZOVIRAX) 400 mg tablet Take 1 Tab by mouth two (2) times a day. 6/1/20  Yes Radha Knight MD   magnesium oxide (MAG-OX) 400 mg tablet Take 1 Tab by mouth daily.  5/21/20  Yes Adelina Ferguson NP   DULoxetine (CYMBALTA) 30 mg capsule Take 1 Cap by mouth daily. 1/20/20  Yes Colleen Gifford MD   cholecalciferol (VITAMIN D3) 400 unit tab tablet Take 2 Tabs by mouth daily. 6/7/19  Yes Radha Hernandez NP   ondansetron hcl (ZOFRAN) 8 mg tablet Take 1 Tab by mouth every eight (8) hours as needed for Nausea. 4/30/19  Yes Griselda Reilly NP   LORazepam (ATIVAN) 1 mg tablet Take  by mouth nightly. Yes Provider, Historical   pravastatin (PRAVACHOL) 10 mg tablet Take  by mouth nightly. Yes Provider, Historical   triamcinolone acetonide (KENALOG) 0.1 % topical cream Apply  to affected area two (2) times a day. use thin layer 6/22/20   Colleen Gifford MD   venetoclax (Venclexta) 100 mg tab Take 1 Tab by mouth daily. 6/1/20   Colleen Gifford MD   lidocaine-prilocaine (EMLA) topical cream Apply  to affected area as needed for Pain. 5/18/20   Vern Montoya NP   vit C/E/zinc/lutein/zeaxanthin ([address] PO) Take 1 Tab by mouth daily. Provider, Historical   acetaminophen 500 mg tab 500 mg, diphenhydrAMINE 25 mg cap 25 mg Take  by mouth nightly.     Provider, Historical        Allergies   Allergen Reactions    Levaquin [Levofloxacin] Other (comments)     tendonitis       Family History   Problem Relation Age of Onset    Cancer Father      Social History     Tobacco Use    Smoking status: Never Smoker    Smokeless tobacco: Never Used   Substance Use Topics    Alcohol use: Never     Frequency: Never        Objective:       Physical Examination:    Vitals:    07/24/20 1334 07/24/20 1338   BP: 162/74    Pulse: 83    Resp: 18    Temp: 98.9 °F (37.2 °C)    Weight:  84.8 kg (187 lb)   Height:  5' 6\" (1.676 m)       Pain Assessment  Pain Intensity 1: 0 (07/24/20 1331)        Patient Stated Pain Goal: 0      HEART: regular rate and rhythm  LUNG: clear to auscultation bilaterally  ABDOMEN: normal findings: soft, non-tender  EXTREMITIES: normal strength, tone, and muscle mass    Laboratory:     Lab Results   Component Value Date/Time    Sodium 142 07/24/2020 08:34 AM    Sodium 140 07/20/2020 08:08 AM    Potassium 4.2 07/24/2020 08:34 AM    Potassium 4.0 07/20/2020 08:08 AM    Chloride 109 (H) 07/24/2020 08:34 AM    Chloride 110 (H) 07/20/2020 08:08 AM    CO2 27 07/24/2020 08:34 AM    CO2 27 07/20/2020 08:08 AM    Anion gap 6 (L) 07/24/2020 08:34 AM    Anion gap 3 (L) 07/20/2020 08:08 AM    Glucose 84 07/24/2020 08:34 AM    Glucose 110 (H) 07/20/2020 08:08 AM    BUN 16 07/24/2020 08:34 AM    BUN 12 07/20/2020 08:08 AM    Creatinine 0.60 07/24/2020 08:34 AM    Creatinine 0.80 07/20/2020 08:08 AM    GFR est AA >60 07/24/2020 08:34 AM    GFR est AA >60 07/20/2020 08:08 AM    GFR est non-AA >60 07/24/2020 08:34 AM    GFR est non-AA >60 07/20/2020 08:08 AM    Calcium 9.0 07/24/2020 08:34 AM    Calcium 9.4 07/20/2020 08:08 AM    Magnesium 2.0 07/24/2020 08:34 AM    Magnesium 1.9 07/20/2020 08:08 AM    Phosphorus 3.1 07/06/2020 12:44 PM    Phosphorus 3.0 12/10/2019 03:48 AM    Albumin 3.6 07/24/2020 08:34 AM    Albumin 3.6 07/20/2020 08:08 AM    Protein, total 6.5 07/24/2020 08:34 AM    Protein, total 6.8 07/20/2020 08:08 AM    Globulin 2.9 07/24/2020 08:34 AM    Globulin 3.2 07/20/2020 08:08 AM    A-G Ratio 1.2 07/24/2020 08:34 AM    A-G Ratio 1.1 (L) 07/20/2020 08:08 AM    ALT (SGPT) 20 07/24/2020 08:34 AM    ALT (SGPT) 17 07/20/2020 08:08 AM     Lab Results   Component Value Date/Time    WBC 3.1 (L) 07/24/2020 01:35 PM    WBC 3.5 (L) 07/24/2020 08:34 AM    HGB 9.9 (L) 07/24/2020 01:35 PM    HGB 9.9 (L) 07/24/2020 08:34 AM    HCT 30.9 (L) 07/24/2020 01:35 PM    HCT 30.1 (L) 07/24/2020 08:34 AM    PLATELET 72 (L) 21/72/3045 01:35 PM    PLATELET 65 (L) 65/74/2345 11:56 AM     Lab Results   Component Value Date/Time    aPTT 37.8 11/19/2019 02:53 AM    aPTT 29.6 11/17/2019 02:05 AM    Prothrombin time 17.7 (H) 11/19/2019 02:53 AM    Prothrombin time 15.6 (H) 11/17/2019 02:05 AM    INR 1.4 11/19/2019 02:53 AM    INR 1.2 11/17/2019 02:05 AM       Assessment:     [facility]    Hospital Problems Date Reviewed: 7/13/2020    None          Plan:     Planned Procedure:  Bone marrow biopsy    Risks, benefits, and alternatives reviewed with patient and she agrees to proceed with the procedure.       Signed By: Mook Amaya PA-C     July 24, 2020

## 2020-07-24 NOTE — PROGRESS NOTES
Arrived to infusion. Patient denies new issues or concerns. Labs completed and patient received 1 unit PLT in preparation for bone marrow bx today. Platelets were 36R pre-transfusion and 65K post. Treatment tolerated well. Next infusion appointment is 8/3/20 at 10am.   Discharged ambulatory with self. Port remains accessed for use in IR.

## 2020-07-24 NOTE — DISCHARGE INSTRUCTIONS
[address]  Department of Interventional Radiology  [facility]  [phone] Office  [phone] Fax    BIOPSY DISCHARGE INSTRUCTIONS    General Instructions:     A biopsy is the removal of a small piece of tissue for microscopic examination or testing. Healthy tissue can be obtained for the purpose of tissue-type matching for transplants. Unhealthy tissues are more commonly biopsied to diagnose disease. Lung Biopsy:     A needle lung biopsy is performed when there is a mass discovered in the lung area. The most serious risk is infection, bleeding, and pneumothorax (a collapsed lung). Signs of pneumothorax include shortness of breath, rapid heart rate, and blueness of the skin. If any of these occur, call 911. Liver Biopsy: This test helps detect cancer, infections, and the cause of an enlargement of the liver or elevated liver enzymes. It also helps to diagnose a number of liver diseases. The pain associated with the procedure may be felt in the shoulder. The risks include internal bleeding, pneumothorax, and injury to the surrounding organs. Renal Biopsy: This procedure is sometimes done to evaluate a transplanted kidney. It is also used to evaluate unexplained decrease in kidney function, blood, or protein in the urine. You may see bright red blood in the urine the first 24 hours after the test. If the bleeding lasts longer, you need to call your doctor. There is a risk of infection and bleeding into the muscle, which may cause soreness. Spinal Biopsy: This test is sometimes done in conjunction with other procedures. Your back will be sore, as we are taking a small sample of bone, which is slightly more difficult to sample than tissue. General Biopsy:     A mass can grow in any area of the body, and we are taking a specimen as ordered by your doctor. The risks are the same.  They include bleeding, pain, and infection. Home Care Instructions: You may resume your regular diet and medication regimen. Do not drink alcohol, drive, or make any important legal decisions in the next 24 hours. Do not lift anything heavier than a gallon of milk until the soreness goes away. You may use over the counter acetaminophen or ibuprofen for the soreness. You may apply an ice pack to the affected area for 20-30 minutes at time for the first 24 hours. After that, you may apply a heat pack. Call If: You should call your Physician and/or the Radiology Nurse if you have any questions or concerns about the biopsy site. Call if you should have increased pain, fever, redness, drainage, or bleeding more than a small spot on the bandage. Follow-Up Instructions: Please see your ordering doctor as he/she has requested. To Reach Us: If you have any questions about your procedure, please call the Interventional Radiology department at [phone]. After business hours (5pm) and weekends, call the answering service at [phone] and ask for the Radiologist on call to be paged. Si tiene Preguntas acerca del procedimiento, por favor llame al departamento de Radiología Intervencional al [phone]. Después de horas de oficina (5 pm) y los fines de [place], llamar al Rashid Garcia al [phone] y pregunte por el Radiologo de [place] [facility]. Interventional Radiology General Nurse Discharge    After general anesthesia or intravenous sedation, for 24 hours or while taking prescription Narcotics:  · Limit your activities  · Do not drive and operate hazardous machinery  · Do not make important personal or business decisions  · Do  not drink alcoholic beverages  · If you have not urinated within 8 hours after discharge, please contact your surgeon on call. * Please give a list of your current medications to your Primary Care Provider.   * Please update this list whenever your medications are discontinued, doses are changed, or new medications (including over-the-counter products) are added. * Please carry medication information at all times in case of emergency situations. These are general instructions for a healthy lifestyle:    No smoking/ No tobacco products/ Avoid exposure to second hand smoke  Surgeon General's Warning:  Quitting smoking now greatly reduces serious risk to your health. Obesity, smoking, and sedentary lifestyle greatly increases your risk for illness  A healthy diet, regular physical exercise & weight monitoring are important for maintaining a healthy lifestyle    You may be retaining fluid if you have a history of heart failure or if you experience any of the following symptoms:  Weight gain of 3 pounds or more overnight or 5 pounds in a week, increased swelling in our hands or feet or shortness of breath while lying flat in bed. Please call your doctor as soon as you notice any of these symptoms; do not wait until your next office visit. Recognize signs and symptoms of STROKE:  F-face looks uneven    A-arms unable to move or move unevenly    S-speech slurred or non-existent    T-time-call 911 as soon as signs and symptoms begin-DO NOT go       Back to bed or wait to see if you get better-TIME IS BRAIN.       Patient Signature:  Date: 7/24/2020  Discharging Nurse: Gloria Espinoza RN

## 2020-07-24 NOTE — PROCEDURES
Department of Interventional Radiology  [phone]        Interventional Radiology Brief Procedure Note    Patient: Amarjit Genao MRN: [id number]  SSN: [id number]    YOB: 1945  Age: 76 y.o.   Sex: female      Date of Procedure: 7/24/2020    Pre-Procedure Diagnosis: AML    Post-Procedure Diagnosis: SAME    Procedure(s): Image Guided Biopsy    Brief Description of Procedure: CT guided bone marrow biopsy    Performed By: Nini Prince PA-C     Assistants: None    Anesthesia:Moderate Sedation per Jewel Aldridge MD    Estimated Blood Loss: Less than 10ml    Specimens:  core and aspirate    Implants:  None    Findings: no post biopsy bleeding    Complications: None    Recommendations: bedrest     Follow Up: referring MD    Signed By: Nini Prince PA-C     July 24, 2020

## 2020-07-27 NOTE — PROGRESS NOTES
Arrived to infusion. Port accessed and labs drawn. No issues or concerns voiced during the visit. No replacements needed. Aware of next lab appointment on 8/3 at 0810. Port deaccessed and pt. Discharged in satisfactory condition to home.

## 2020-08-03 NOTE — PROGRESS NOTES
8/3/20:  Patient in for follow-up with Dr. Libby Pardo after bone marrow biopsy. Dr. Libby Pardo reviewed results with the patient and plans to hold off on vidaza for another two weeks to give platelets more time to recover. Discussed medication list and Dr. Libby Pardo would like the patient to hold gabapentin for 2 weeks to see if it has affected counts. Patient to have labs only in 1 week and return in 2 weeks to hopefully start next vidaza.

## 2020-08-17 NOTE — PROGRESS NOTES
8/17/20:  Patient in for follow-up with Dr. Edy Adkins and pre-chemo Janette Kevin and Amparo Jefferson. Patient reports feeling well and doing good. Patient's platelets still at 52S but Dr. Edy Adkins would like the patient to continue with treatment today as planned. Vidaza ramp up to start today at 10mg, tomorrow 20mg, day 3 50mg and then day 4-14 100mg daily. Patient aware that to stop venclexta at day 14. Patient to return to twice weekly labs and replacements. 2 weeks with NP and then 4 weeks with Dr. Edy Adkins prior to the next cycle.

## 2020-08-17 NOTE — PROGRESS NOTES
Arrived to the [facility]. Charito Hernandez completed. Patient tolerated well. Any issues or concerns during appointment: none  Patient aware of next infusion appointment on 8/18/20 at 10am. Discharged ambulatory with self.

## 2020-08-18 NOTE — PROGRESS NOTES
Arrived to the [facility]. Chemo completed. Patient tolerated well. Port flushed and left accessed for use in AM.  Any issues or concerns during appointment: None. Patient aware of next infusion appointment on 8/19 (date) at 56 (time). Discharged ambulatory in stable condition.

## 2020-08-19 NOTE — PROGRESS NOTES
Arrived to the [facility]. Assessment complete. Rosamaria Lima completed. Patient tolerated without problems. Any issues or concerns during appointment: None. Pt's port left accessed, per pt request, for infusion appointment tomorrow. Patient aware of next infusion appointment on 8/20/2020 (date) at 56 (time). Discharged ambulatory.

## 2020-08-20 NOTE — PROGRESS NOTES
Arrived to the [facility]. Assessment completed and labs reviewed. Pre meds and Vidaza infusion completed. Patient tolerated well. Any issues or concerns during appointment: none. Patient aware of next infusion appointment on 08/21/2020 at 1000. Discharged ambulatory.

## 2020-08-21 NOTE — PROGRESS NOTES
Arrived to the [facility]. Premeds given, Vidaza completed. Patient tolerated well. Any issues or concerns during appointment: none. Patient aware of next infusion appointment on 8/24 at 0800. Discharged ambulatory to home.

## 2020-08-21 NOTE — PROGRESS NOTES
Problem: Knowledge Deficit  Goal: *Participate in the learning process  Outcome: Progressing Towards Goal  Goal: *Verbalize description of procedure  Outcome: Progressing Towards Goal  Goal: *Verbalizes understanding and describes medication purposes and frequencies  Outcome: Progressing Towards Goal  Goal: *Knowledge of discharge instructions  Outcome: Progressing Towards Goal  Goal: Interventions  Outcome: Progressing Towards Goal     Problem: Patient Education: Go to Patient Education Activity  Goal: Patient/Family Education  Outcome: Progressing Towards Goal

## 2020-08-24 NOTE — PROGRESS NOTES
Clinical Social Work Note Name: Gifty Owen : 1945 MRN: [id number] Date of Service: 2020 Type of Service: Case Management Length of Service: 30 minutes Patient Diagnosis: 1. Acute myeloid leukemia not having achieved remission ([facility] [address] Referral Source: Infusion RN Reason for Visit: fu 
 
CM Note: Seen patient at Infusion. \"I will be in treatment for the rest of my life\", writer provided validation. LISW-CP discussed Distress by using NCCN Guidelines for Patients, Distress with patient. At this moment, pt denies any psychosocial and economic needs. Mini Mental Status Exam: Gifty Owen was dressed properly. No abnormal psychomotor movements observed. Intellectual functioning appeared to be intact. Insight was adequate. Judgment was adequate. Patient did not report suicidal ideations, intent or plans. Speech was coherent. Thought process was clear. Patient did not report homicidal ideations, intent or plans. Patient was oriented to self, place, time and situation. Protective Factors: Current care for physical and mental illness, adequate insight and judgment, family support, cultural and Nondenominational beliefs and values that support self-care. Next Steps: LISW-CP gave contact information and encouraged pt to call should any needs arise. Pt verbalized understanding. SW intends to follow up as needed. 3 most recent [facility] 2020 8/3/2020 2020 PHQ Not Done - - - Little interest or pleasure in doing things Not at all Not at all Not at all Feeling down, depressed, irritable, or hopeless Several days Not at all Several days Total Score PHQ 2 1 0 1 Trouble falling or staying asleep, or sleeping too much - - - Feeling tired or having little energy - - - Poor appetite, weight loss, or overeating - - - Feeling bad about yourself - or that you are a failure or have let yourself or your family down - - -  
 Trouble concentrating on things such as school, work, reading, or watching TV - - - Moving or speaking so slowly that other people could have noticed; or the opposite being so fidgety that others notice - - - Thoughts of being better off dead, or hurting yourself in some way - - -  
PHQ 9 Score - - - How difficult have these problems made it for you to do your work, take care of your home and get along with others - - - Electronically Signed By: 
CARMEN Taylor

## 2020-08-24 NOTE — PROGRESS NOTES
Arrived to the [facility]. Lab draw from [facility] completed. Patient tolerated well. No replacements required today. Any issues or concerns during appointment: NO. 
Patient aware of next infusion appointment on 08/27/2020 (date) at 0800 (time). Discharged ambulatory.

## 2020-08-27 NOTE — PROGRESS NOTES
Arrived to the [facility]. Labs completed. No replacements needed today. Patient tolerated well. Any issues or concerns during appointment: denies Patient aware of next infusion appointment on 8/31/20 at 730am. Discharged ambulatory with self.

## 2020-09-03 NOTE — PROGRESS NOTES
Pt arrived ambulatory, labs drawn and reviewed, no replacements needed,port deaccessed and pt discharged home Next appt 9/7

## 2020-09-07 NOTE — PROGRESS NOTES
Arrived to the [facility]. Assessment completed, labs drawn and reviewed. NO treatment required . Patient tolerated without problems. Any issues or concerns during appointment: None Instructed to call Dr Rosa Avila with any side effects or concerns Patient aware of next infusion appointment on 9/10/20(date) at 8 30 AM (time). Discharged ambulatory

## 2020-09-10 NOTE — PROGRESS NOTES
Clinical Social Work Note Name: Herminia Trent : 1945 MRN: [id number] Date of Service: 9/10/2020 Location of Service: [facility] Date of Service: 9/10/2020 Type of Service: Case Management Length of Service: 15 minutes Patient Diagnosis: 1. Acute myeloid leukemia not having achieved remission ([facility] [address] Referral Source: rn 
 
Reason for Visit: fu 
 
Subject: Seen patient at Infusion, Pod3. Patient was reading the Bible, writer discussed mindfulness exercises to assist with relaxation. Mental Status Exam: Intellectual functioning appears to be intact. Mood is \"better\" and affect is good. Insight is adequate. Judgment is adequate. Patient did not report suicidal ideations, intent or plans. Patient did not report homicidal ideations, intent or plans. Patient is oriented to self, place, time and situation. Protective Factors: Current care for physical and mental illness, adequate insight and judgment, family support, cultural and Taoist beliefs and values that support self-care. Next Steps: SW gave contact information and encouraged pt to call should any needs arise. Pt verbalized understanding. SW intends to follow up by phone and/or face-to-face as needed. 3 most recent [place] [id number] Screens 2020 2020 8/3/2020 PHQ Not Done - - - Little interest or pleasure in doing things Not at all Not at all Not at all Feeling down, depressed, irritable, or hopeless Not at all Several days Not at all Total Score PHQ 2 0 1 0 Trouble falling or staying asleep, or sleeping too much - - - Feeling tired or having little energy - - - Poor appetite, weight loss, or overeating - - - Feeling bad about yourself - or that you are a failure or have let yourself or your family down - - - Trouble concentrating on things such as school, work, reading, or watching TV - - - Moving or speaking so slowly that other people could have noticed; or the opposite being so fidgety that others notice - - - Thoughts of being better off dead, or hurting yourself in some way - - -  
PHQ 9 Score - - - How difficult have these problems made it for you to do your work, take care of your home and get along with others - - - Signed By: CARMEN Rondon September 10, 2020

## 2020-09-10 NOTE — PROGRESS NOTES
Arrived to the [facility]. Assessment completed, labs drawn and  reviewed. No treatment needed . Patient tolerated without problems. Any issues or concerns during appointment: None Instructed to call Dr Zeenat Oglesby  with any side effects or concerns Patient aware of next infusion appointment on 9/14/20 (date) at 4 29 PM  (time). Discharged ambulatory

## 2020-09-21 NOTE — PROGRESS NOTES
Arrived to the [facility]. Assessment completed, labs drawn and  reviewed. No treatment needed. Patient tolerated without problems. Any issues or concerns during appointment: None Instructed to call Dr Ad Pardo  with any side effects or concerns Patient aware of next infusion appointment on 9/28/20(date) at 2 PM (time). Discharged ambulatory

## 2020-09-21 NOTE — PROGRESS NOTES
Progress Note Name: Tim Young : 1945 MRN: [id number] Date of Service: 2020 Length of Service: 15 minutes Patient Diagnosis: 1. Acute myeloid leukemia not having achieved remission ([facility] [address] Reason for Visit: F/U Subjective: Seen patient at Infusion, Pod 3. Patient denies suicidal or homicidal ideations, intent or plans. Patient denies self-injury behaviors. Pt denies alcohol and other substance abuse. LISW-CP discussed Distress by using NCCN Guidelines for Patients, Distress with patient. Denies psychosocial needs at this time. Objective: 
Appearance ? Hygiene: clean and  grooming ? Dress: clean,  neat, climate appropriate Speech ? General:  clarity ? Rate: normal 
? Latency: none ? Volume: normal 
Behavior ? General: relaxed ? Eye Contact: appropriate Cooperativeness ? Cooperative and friendly. Thinking Thought Processes 
?  logical and goal directed (self-care) Thought Content ? Future oriented Mood ? Good\" Affect ? Type:  congruent ? Range: full range ? Appropriateness to content and congruence with stated mood Insight/Judgment ? Adequate Assessment/Plans: Will continue to meet with and be available to patient as as needed 3 most recent [facility] Screens 2020 PHQ Not Done - - - Little interest or pleasure in doing things Not at all Not at all Not at all Feeling down, depressed, irritable, or hopeless Not at all Not at all Several days Total Score PHQ 2 0 0 1 Trouble falling or staying asleep, or sleeping too much - - - Feeling tired or having little energy - - - Poor appetite, weight loss, or overeating - - - Feeling bad about yourself - or that you are a failure or have let yourself or your family down - - - Trouble concentrating on things such as school, work, reading, or watching TV - - - Moving or speaking so slowly that other people could have noticed; or the opposite being so fidgety that others notice - - - Thoughts of being better off dead, or hurting yourself in some way - - -  
PHQ 9 Score - - - How difficult have these problems made it for you to do your work, take care of your home and get along with others - - - CARMEN Alvarado

## 2020-09-28 NOTE — PROGRESS NOTES
Arrived to the [facility]. Ludin Lay completed. Patient tolerated well. Any issues or concerns during appointment: none. Patient aware of next infusion appointment on 9/29/20 at 1530. Discharged amb.

## 2020-09-29 NOTE — PROGRESS NOTES
Arrived to the [facility]. Vidaza infusion completed. Patient tolerated well. Any issues or concerns during appointment: none. Patient aware of next infusion appointment on 9/30 (date) at 3:00 PM (time). Discharged ambulatory.

## 2020-09-30 NOTE — PROGRESS NOTES
[facility]    [address] [address]    Phone:  [phone]    Fax:  [phone]       Thank You for choosing us for your health care visit. We are glad to serve you and happy to provide you with this summary of your visit. Please help us to ensure we have accurate records. If you find anything that needs to be changed, please let our staff know as soon as possible.          Your Demographic Information     Patient Name Sex John Sánchez Male 1964       Ethnic Group Patient Race    Not of  or  Origin Unknown      Your Visit Details     Date & Time Provider Department    2017 2:40 PM Wanda Angeles MD [facility]      Your To Do List     Standing Orders Interval Expires    TETANUS/DIPHTHERIA/ACELLULAR PERTUSSIS 10+ ([facility])   2018      We Ordered or Performed the Following     SERVICE TO ORTHOPEDICS     TETANUS/DIPHTHERIA/ACELLULAR PERTUSSIS 10+ ([facility])       Conditions Discussed Today or Order-Related Diagnoses        Comments    Chronic right shoulder pain    -  Primary     Tinnitus of right ear         Need for vaccination           Your Vitals Were     BP Pulse Resp Height Weight BMI    132/86 (BP Location: [facility], Patient Position: Sitting, Cuff Size: Large Adult) 60 16 5' 3\" (1.6 m) 224 lb (101.6 kg) 39.68 kg/m2    Smoking Status                   Former Smoker           Medications Prescribed or Re-Ordered Today     None      Your Current Medications Are        Disp Refills Start End    ezetimibe (ZETIA) 10 MG tablet 90 tablet 0 2017     Sig: TAKE ONE TABLET BY MOUTH ONCE DAILY    Class: Eprescribe    atorvastatin (LIPITOR) 80 MG tablet 45 tablet 0 2017     Sig: TAKE ONE-HALF TABLET BY MOUTH NIGHTLY    Class: Eprescribe    Meclizine HCl 25 MG tablet 30 tablet 0 2013     Sig - Route: Take 25 mg by mouth 3 times daily as needed for Dizziness.  Patient here for chemotherapy. Reviewed the procedure and process with patient and she verbalizes understanding. - Oral    Class: Eprescribe    omeprazole (PRILOSEC) 20 MG capsule        Sig - Route: Take 20 mg by mouth daily. - Oral    Class: Historical Med      Allergies     Niaspan [Niacin] RASH    Zocor [Simvastatin] WEAKNESS      Immunizations History as of 6/21/2017     Name Date    Influenza 10/13/2016    Pneumococcal Polysaccharide Adult 4/22/2013  4:27 PM    Tdap 6/21/2017  3:16 PM, 5/28/2007      Problem List as of 6/21/2017     HLD (hyperlipidemia)    GERD (gastroesophageal reflux disease)    Obesity    Disturbance of skin sensation    Bilateral carpal tunnel syndrome              Patient Instructions    You can call [phone] to set up an appointment with Dr. Vasyl Mayo for your shoulder.

## 2020-09-30 NOTE — PROGRESS NOTES
Arrived to the [facility]. Assessment completed. Patient tolerated chemotherapy well. Any issues or concerns during appointment: none. Patient aware of next infusion appointment on 10/1/20 (date) at 0 (time) with IV infusion center. Discharged ambulatory, per self. Patient instructed to call Dr. Breonna Kate' office immediately for any problems or concerns. She verbalizes understanding.

## 2020-10-01 NOTE — PROGRESS NOTES
Arrived to the [facility]. Assessment completed blood return obtained from port but unable to get labs from port, labs drawn peripherally and reviewed. Hollace Osler completed. Patient tolerated without problems. Any issues or concerns during appointment: None  Instructed to call Dr Yolanda Bowers  with any side effects or concerns  Patient aware of next infusion appointment on 10/2/20 (date) at 1 30 PM (time).   Discharged ambulatory

## 2020-10-02 NOTE — PROGRESS NOTES
Arrived to the [facility]. Chemo completed. Patient tolerated well. Port flushed and de-accessed  Any issues or concerns during appointment: None. Patient aware of next infusion appointment on 10/5 (date) at 0930 (time). Discharged ambulatory in stable condition.

## 2020-10-02 NOTE — PROGRESS NOTES
Problem: Knowledge Deficit  Goal: *Participate in the learning process  Outcome: Progressing Towards Goal

## 2020-10-05 NOTE — PROGRESS NOTES
Progress Note Name: Norm Isaac : 1945 MRN: [id number] Date of Service: 10/5/2020 Length of Service: 15 minutes Patient Diagnosis: 1. Acute myeloid leukemia not having achieved remission ([facility] [address] Reason for Visit: F/U Subjective: Seen patient by herself at POD 3 at Infusion. . Patient denies suicidal or homicidal ideations, intent or plans. Patient denies self-injury behaviors. Pt denies alcohol and other substance abuse. LISW-CP discussed Distress by using NCCN Guidelines for Patients, Distress with patient. Denies psychosocial needs at this time. Objective: 
Appearance ? Hygiene: clean and  grooming ? Dress: clean,  neat, climate appropriate Speech ? General:  clarity ? Rate: normal 
? Latency: none ? Volume: normal 
Behavior ? General: relaxed ? Eye Contact: appropriate Cooperativeness ? Cooperative and friendly. Thinking Thought Processes 
?  logical and goal directed (self-care) Thought Content ? Future oriented Mood ? Good\" Affect ? Type:  congruent ? Range: full range ? Appropriateness to content and congruence with stated mood Insight/Judgment ? Adequate Assessment/Plans: Will continue to meet with and be available to patient as as needed 3 most recent [facility] Screens 2020 PHQ Not Done - - - Little interest or pleasure in doing things Not at all Not at all Not at all Feeling down, depressed, irritable, or hopeless Not at all Not at all Not at all Total Score PHQ 2 0 0 0 Trouble falling or staying asleep, or sleeping too much - - - Feeling tired or having little energy - - - Poor appetite, weight loss, or overeating - - - Feeling bad about yourself - or that you are a failure or have let yourself or your family down - - -  
 Trouble concentrating on things such as school, work, reading, or watching TV - - - Moving or speaking so slowly that other people could have noticed; or the opposite being so fidgety that others notice - - - Thoughts of being better off dead, or hurting yourself in some way - - -  
PHQ 9 Score - - - How difficult have these problems made it for you to do your work, take care of your home and get along with others - - - CARMEN Acevedo

## 2020-10-05 NOTE — PROGRESS NOTES
Dual port accessed medial port with positive blood return but  unable to obtain blood for blood draw from medial site . No blood return from lateral side. Labs obtained from peripheral stick

## 2020-10-05 NOTE — PROGRESS NOTES
[facility] completed, labs drawn and  reviewed. NO replacements needed  Patient tolerated without problems. Any issues or concerns during appointment: None Instructed to call Dr Sabiha Ambrose  with any side effects or concerns Patient aware of next infusion appointment on 10/8/20 (date) at 5 AM (time). Discharged ambulatory

## 2020-10-08 NOTE — PROGRESS NOTES
Arrived to the [facility]. Labs completed. Patient tolerated well. No replacements necessary. Any issues or concerns during appointment: none Patient aware of next infusion appointment on 10/12 at 11:30 Discharged ambulatory to home.

## 2020-10-15 NOTE — PROGRESS NOTES
Clinical Social Work Note Name: Surendra Morales : 1945 MRN: [id number] Date of Service: 10/15/2020 Location of Service: [facility] Date of Service: 10/15/2020 Type of Service: Case Management Length of Service: 15 minutes Patient Diagnosis: 1. Acute myeloid leukemia not having achieved remission ([facility] [address] Referral Source: rn 
 
Reason for Visit: fu 
 
Subject: Seen patient at Infusion. CARMEN-JENNIFER overviewed with pt relaxation and daily mindfulness and gave a handout. At  this moment, pt denies any psychosocial and economic needs. Protective Factors: Current care for physical and mental illness, adequate insight and judgment, family support, cultural and Hoahaoism beliefs and values that support self-care. Next Steps: SW gave contact information and encouraged pt to call should any needs arise. Pt verbalized understanding. SW intends to follow up by phone and/or face-to-face as needed. 3 most recent [facility] [address] Screens 10/12/2020 2020 2020 PHQ Not Done - - - Little interest or pleasure in doing things Not at all Not at all Not at all Feeling down, depressed, irritable, or hopeless Not at all Not at all Not at all Total Score PHQ 2 0 0 0 Trouble falling or staying asleep, or sleeping too much - - - Feeling tired or having little energy - - - Poor appetite, weight loss, or overeating - - - Feeling bad about yourself - or that you are a failure or have let yourself or your family down - - - Trouble concentrating on things such as school, work, reading, or watching TV - - - Moving or speaking so slowly that other people could have noticed; or the opposite being so fidgety that others notice - - - Thoughts of being better off dead, or hurting yourself in some way - - -  
PHQ 9 Score - - - How difficult have these problems made it for you to do your work, take care of your home and get along with others - - - Signed By: CARMEN Shankar October 15, 2020

## 2020-10-15 NOTE — PROGRESS NOTES
Arrived to the [facility]. Lab draw completed. Patient tolerated okay, no blood return from port. Patient had to be stuck peripherally for labs. No other issues this appointment. PLT 27, no replacements needed today per orders. Port flushed with heparin before de-accessing. Patient aware of next infusion appointment on 10/18 at 8:30am. 
Discharged ambulatory to home.

## 2020-10-19 NOTE — PROGRESS NOTES
Arrived to the [facility]. Blood draw completed. Patient tolerated well. Platelets 23 and Hgb 6.7. No blood products needed based upon parameters. Any issues or concerns during appointment: none. Patient aware of next infusion appointment on 10/22 at 8:30 Discharged ambulatory to home.

## 2020-10-22 NOTE — PROGRESS NOTES
Arrived to the [facility]. Assessment completed, labs  drawn and reviewed. No replacements needed. Patient tolerated without problems. Any issues or concerns during appointment: None Instructed to call Dr Juana Esquivel  with any side effects or concerns Patient aware of next infusion appointment on 10/26/20 (date) at 10 34 AM (time). Discharged ambulatory

## 2020-10-22 NOTE — PROGRESS NOTES
MANJU Progress Note Name: Kathrine Zendejas : 1945 MRN: [id number] Date of Service: 10/22/2020 Patient Diagnosis: 1. [facility] not having [facility] Subjective: 
ALLAN saw patient at infusion. Patient denies suicidal or homicidal ideations, intent or plans. Patient denies self-injury behaviors. Pt denies alcohol and other substance abuse. Objective: 
Appearance ? Hygiene: clean,  grooming ? Dress: clean, neat, climate appropriate  Nothing unusual 
? Jewelry:   Nothing unusual 
? Makeup:   Nothing unusual 
? Other: prominent scars, tattoos  Nothing unusual 
Speech ? General: clarity of speech ? Rate: normal  
? Latency: normal - No pauses between questions and answers ? Volume: normal 
? Intonations: normal 
Behavior ? General: normal range ? Eye Contact: appropriate ? Mannerisms, stereotypies, posturing - None noticed Cooperativeness ? Cooperative Thinking Thought Processes ? Clear, goal directed Thought Content ? Future oriented Perceptions ? Hallucinations: None ? Delusions: none Mood ? \" good \" Affect ? Type: Congruent ? Range: Full range ? Appropriateness to content and congruence with stated mood Insight/Judgment ? Adequate Assessment: 
Type of approach used: DBT and CBT Patient response to intervention: Appropriate Protective Factors:  Self-Determination Plans: Will continue to meet with and be available to patient as scheduled and per patient's request/compliance. CARMEN Moreno

## 2020-10-26 NOTE — PROGRESS NOTES
Social Work Progress Note Name: Waleska Lugo : 1945 MRN: [id number] Date of Service: 10/26/2020 Length of Service: 15 minutes Patient Diagnosis: 1. Acute myeloid leukemia not having achieved remission ([facility] Reason for Visit: F/U Subjective: Seen patient at Infusion. Gave paper on deep breathing and relaxation. Pt denies any other psychosocial needs at this time. Pt declined assistance with individual therapy. Protective Factors: Current care for physical and mental illness, adequate insight and judgment, family support, cultural and Jainism beliefs and values that support self-care. Patient did not report suicidal ideations, intent or plans. Patient did not report homicidal ideations, intent or plans. Patient is oriented to self, place, time and situation. Next Steps: LISW-CP gave contact information and encouraged pt to call should any needs arise. Pt verbalized understanding. LISW-CP intends to follow up as needed. 3 most recent [facility] 10/12/2020 2020 2020 PHQ Not Done - - - Little interest or pleasure in doing things Not at all Not at all Not at all Feeling down, depressed, irritable, or hopeless Not at all Not at all Not at all Total Score PHQ 2 0 0 0 Trouble falling or staying asleep, or sleeping too much - - - Feeling tired or having little energy - - - Poor appetite, weight loss, or overeating - - - Feeling bad about yourself - or that you are a failure or have let yourself or your family down - - - Trouble concentrating on things such as school, work, reading, or watching TV - - - Moving or speaking so slowly that other people could have noticed; or the opposite being so fidgety that others notice - - - Thoughts of being better off dead, or hurting yourself in some way - - -  
PHQ 9 Score - - - How difficult have these problems made it for you to do your work, take care of your home and get along with others - - - CARMEN Paz

## 2020-10-26 NOTE — PROGRESS NOTES
Arrived to the [facility]. Labs/Replacements completed; Assessment and labs completed. Patient tolerated very well. Any issues or concerns during appointment: Magnesium: 1.6; no perimeters for magnesium; patient received education and hand-out from [facility] in regards to s/sx of low magnesium; print-out of labs given as well; patient had no questions nor concerns at this time; instructed to call office for any s/sx of low magnesium. Patient is taking 400 mg once a day of mag-ox at home. Patient aware of next infusion appointment on 10/29/2020 @ 0830. Discharged ambulatory to private residence.

## 2020-10-29 NOTE — PROGRESS NOTES
Arrived to the [facility]. Labs drawn, no replacements needed today. Mag 1.7, patient encouraged to keep taking mag replacement pill and to increase intake of magnesium rich food. Patient tolerated well. Any issues or concerns during appointment: none. Patient aware of next infusion appointment on 11/2 at 2:30pm.  Discharged ambulatory to home.

## 2020-10-29 NOTE — PROGRESS NOTES
Clinical Social Work Note  Name: Regine Sr  : 1945  MRN: [id number]  Date of Service: 10/29/2020  Location of Service: [facility]  Date of Service: 10/29/2020    Type of Service: Case Management     Length of Service: 15 minutes    Patient Diagnosis:   1. Acute myeloid leukemia not having achieved remission [facility])        Subject: Patient denied depression problems and transportation needs. Mental Status Exam: Intellectual functioning appears to be intact. Mood is \"ok\" and affect is good. Insight is adequate. Judgment is adequate. Patient did not report suicidal ideations, intent or plans. Patient did not report homicidal ideations, intent or plans. Patient is oriented to self, place, time and situation. Protective Factors: Current care for physical and mental illness, adequate insight and judgment, family support, cultural and Oriental orthodox beliefs and values that support self-care. Next Steps: SW gave contact information and encouraged pt to call should any needs arise. Pt verbalized understanding. SW intends to follow up by phone and/or face-to-face as needed.     3 most recent [facility] Screens 10/12/2020 2020 2020   PHQ Not Done - - -   Little interest or pleasure in doing things Not at all Not at all Not at all   Feeling down, depressed, irritable, or hopeless Not at all Not at all Not at all   Total Score PHQ 2 0 0 0   Trouble falling or staying asleep, or sleeping too much - - -   Feeling tired or having little energy - - -   Poor appetite, weight loss, or overeating - - -   Feeling bad about yourself - or that you are a failure or have let yourself or your family down - - -   Trouble concentrating on things such as school, work, reading, or watching TV - - -   Moving or speaking so slowly that other people could have noticed; or the opposite being so fidgety that others notice - - -   Thoughts of being better off dead, or hurting yourself in some way - - -   PHQ 9 Score - - - How difficult have these problems made it for you to do your work, take care of your home and get along with others - - -         Signed By: Courtland Lefort, LISW     October 29, 2020

## 2020-11-02 NOTE — PROGRESS NOTES
11/2/20 Pt came in for a follow up visit. Pt states she still feels a little weak. Lab wokr looks good but we will hold off on treatment this week and do treatment for next week. Pt will still be scheduled with twice weekly labs. A new prescription for prilosec 20mg was sent to pt pharmacy.

## 2020-11-09 NOTE — PROGRESS NOTES
Arrived to the [facility]. Yani Zhu completed. Patient tolerated well. Any issues or concerns during appointment: None. Patient aware of next infusion appointment on November 10th at 1100. Discharged ambulatory.

## 2020-11-09 NOTE — PROGRESS NOTES
11/9/20 Pt came in for a follow up with NP. Pt looks great, and lab work is where we expect it to be. Sent in a script for magnesium supplement refill. Pt will receive Birdie Navarro today and is still scheduled for twice weekly lab work.

## 2020-11-10 NOTE — PROGRESS NOTES
Arrived to the [facility]. Felix Crockett completed. Patient tolerated well. Any issues or concerns during appointment: none. Discharged ambulatory.     Celina German RN

## 2020-11-11 NOTE — PROGRESS NOTES
Arrived to the [facility]. [address] completed. Patient tolerated well. Any issues or concerns during appointment: none. Patient aware of next infusion appointment on 11/12 at 11:00am.  Discharged ambulatory to home.

## 2020-11-12 NOTE — PROGRESS NOTES
Arrived to the [facility]. Labs and Vidaza completed. Patient tolerated without adverse reaction. Any issues or concerns during appointment: none. Patient aware of next infusion appointment on 11/13 (date) at 56 (time). Discharged to home.

## 2020-11-13 NOTE — PROGRESS NOTES
Arrived to the [facility]. Day 5 Vidaza completed. Patient tolerated well. Any issues or concerns during appointment: No.  Discharged home in stable condition.

## 2020-11-24 NOTE — PROGRESS NOTES
11/23/20 - Patient here for follow up. Patient has no complaints at this time. Patient will continue twice weekly labs with replacements as needed. Next cycle of Vidaza due 12/7 and patient has schedule with current appointments. No other needs noted at this time.

## 2020-11-26 NOTE — PROGRESS NOTES
[facility] Platelet transfusion completed. Patient tolerated well. Any issues or concerns during appointment: none. Patient aware of next infusion appointment on 12/7 at [facility] Discharged ambulatory to home.

## 2020-12-04 NOTE — PROGRESS NOTES
Arrived to the [facility]. Assessment completed. Patient tolerated one unit of platelets well. Any issues or concerns during appointment: none. Patient aware of next infusion appointment on 12/7/20 (date) at 80 (time) with IV infusion center. Discharged ambulatory, with spouse. Patient instructed to call Dr. Abiola France' office immediately for any problems or concerns. She verbalizes understanding.

## 2020-12-04 NOTE — PROGRESS NOTES
Patient here for platelet transfusion. Reviewed the procedure and process with patient and she verbalizes understanding.

## 2020-12-07 NOTE — PROGRESS NOTES
12/7: Patient seen in the office with Dr. Tosha Louise, patient feeling well, notes she does have some weakness at times, had a recent fall right before thanksgiving that resulted in a CT head/face and platelets. Patient encouraged to use cane for security and prevention of falls. Labs fall outside the treatment plan protocol and Dr. Tosha Louise would like to hold treatment for 2 weeks and resume if labs are better on 12/21. Patient scheduled for twice weekly labs.

## 2020-12-12 NOTE — PROGRESS NOTES
Arrived to the [facility]. Assessment and 1unit PRBCs completed. Patient tolerated well. Any issues or concerns during appointment: No. 
Patient aware of next infusion appointment on 12/21/20 at 11am. 
Discharged ambulatory.

## 2020-12-14 NOTE — PROGRESS NOTES
Pt arrived ambulatory to [facility]. Port accessed with good blood return. NS KVO. Tylenol and benadryl po. 1 unit of platelets infused. Pt aware of next appt on 12/21/20 at 1100. Port flushed and de accessed. Pt discharged ambulatory.

## 2020-12-17 NOTE — PROGRESS NOTES
Arrived to the [facility]. 1 unit PRBC completed. Patient tolerated well. Any issues or concerns during appointment: none. Patient aware of next infusion appointment on 12/21 @1100. Discharged ambulatory.

## 2020-12-21 PROBLEM — R50.81 NEUTROPENIC FEVER (HCC): Status: ACTIVE | Noted: 2020-01-01

## 2020-12-21 PROBLEM — D70.9 NEUTROPENIC FEVER (HCC): Status: ACTIVE | Noted: 2020-01-01

## 2020-12-21 NOTE — PROGRESS NOTES
12/21 Pt came in for a follow up visit. Pt states she has had some wheezing at night for the past week as well at temps. We will be collecting a rapid covid test on pt. Due to her low counts, we will get BC/UC/UA done, give 1000mg IV Vanc, 2g IV Max and transfuse 1 unit of platelets. Depending on pt rapid, we will either admit her to [facility] if positive or [facility] if negative. While IP pt will geta bmbx.  Instructed pt to please call in the future if she is experiencing anything out of the normal.   Once out of the hospital, pt will need to come in for labs/replacements 3x weekly   We will hold her Vidaza this week

## 2020-12-21 NOTE — PROGRESS NOTES
Arrived to the [facility]. Assessment complete. BC/UC/UA completed. Vancomycin and Maxipime completed. Pt to receive one unit of platelets on admission. Patient tolerated treatment without problems. Any issues or concerns during appointment: None. Pt's port flushed and left accessed for admission. Discharged ambulatory with spoue. Pt and pt's spouse aware they are to report to [facility], to room 361.

## 2020-12-21 NOTE — H&P
History and Physical 
 
     
 
Inpatient Hematology / Oncology History and Physical 
 
Reason for Asmission:  neutropenic fever History of Present Illness: Ms. Moody Waldrop is a 76 y.o. female admitted on (Not on file) with a primary diagnosis of There were no encounter diagnoses. 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 Seen today in office 12/21/2020. Admits today to having had off-and-on fevers (highest yesterday 100.7) for about a week with associated wheezing at night, using albuterol as needed. Her recent counts remain depressed, thrombocytopenia worse, concern for disease relapse, will repeat BM biopsy. Increase labs to 3 times a week with transfusions as needed. COVID19 screen -ve. She will be admitted to   [facility] for Broad-spectrum antibiotics, as well as panculture. Hopefully will be able to go home prior to [place]. We will also get BM biopsy prior to discharge. Review of Systems: As mentioned above. All other systems reviewed in full and are unremarkable. Allergies Allergen Reactions  Levaquin [Levofloxacin] Other (comments)  
  tendonitis Past Medical History:  
Diagnosis Date  AML (acute myeloid leukemia) ([facility] dx- 4/2019  
 followed by dr Edmund England  Depression  Hypercholesterolemia  Infection   
 of port -- was placed 5/2019-- removed 6/2019---right chest  
 Psychiatric disorder   
 aniexty  Sleep apnea Past Surgical History:  
Procedure Laterality Date  HX OTHER SURGICAL    
 colonoscopy  HX VASCULAR ACCESS    
 IR INSERT TUNL CVC W PORT OVER 5 YEARS  4/30/2019  IR INSERT TUNL CVC W PORT OVER 5 YEARS  7/15/2019  IR REMOVE TUNL CVAD W PORT/PUMP  6/13/2019 Family History Problem Relation Age of Onset  Cancer Father Social History Socioeconomic History  Marital status:  Spouse name: Not on file  Number of children: Not on file  Years of education: Not on file  Highest education level: Not on file Occupational History  Not on file Social Needs  Financial resource strain: Not on file  Food insecurity Worry: Not on file Inability: Not on file  Transportation needs Medical: Not on file Non-medical: Not on file Tobacco Use  Smoking status: Never Smoker  Smokeless tobacco: Never Used Substance and Sexual Activity  Alcohol use: Never Frequency: Never  Drug use: Never  Sexual activity: Not on file Lifestyle  Physical activity Days per week: Not on file Minutes per session: Not on file  Stress: Not on file Relationships  Social connections Talks on phone: Not on file Gets together: Not on file Attends Yazdanism service: Not on file Active member of club or organization: Not on file Attends meetings of clubs or organizations: Not on file Relationship status: Not on file  Intimate partner violence Fear of current or ex partner: Not on file Emotionally abused: Not on file Physically abused: Not on file Forced sexual activity: Not on file Other Topics Concern [address] Service Not Asked  Blood Transfusions Not Asked  Caffeine Concern Not Asked  Occupational Exposure Not Asked Alejandra Amari Hazards Not Asked  Sleep Concern Not Asked  Stress Concern Not Asked  Weight Concern Not Asked  Special Diet Not Asked  Back Care Not Asked  Exercise Not Asked  Bike Helmet Not Asked  Seat Belt Not Asked  Self-Exams Not Asked Social History Narrative  Not on file Current Outpatient Medications Medication Sig Dispense Refill  magnesium oxide (MAG-OX) 400 mg tablet Take 1 Tab by mouth daily. 90 Tab 3  
 alprazolam (XANAX PO) Take 1 Tab by mouth every eight (8) hours as needed.  omeprazole (PRILOSEC) 20 mg capsule Take 1 Cap by mouth daily. 90 Cap 3  
 voriconazole (VFEND) 200 mg tablet TAKE 1 TABLET BY MOUTH EVERY 12 HOURS 60 Tab 3  potassium chloride (K-DUR, KLOR-CON) 20 mEq tablet Take 1 Tab by mouth daily. Indications: prevention of low potassium in the blood 30 Tab 2  
 DULoxetine (Cymbalta) 30 mg capsule Take 1 Cap by mouth daily. 30 Cap 5  
 acyclovir (ZOVIRAX) 400 mg tablet Take 1 Tab by mouth two (2) times a day. 60 Tab 11  
 venetoclax 50 mg tab Take 70 mg (one 50 mg pill and two 10 mg pills) daily for 14 days on and then 14 days off 14 Tab 5  
 venetoclax 10 mg tab Take 70 mg (one 50 mg pill and two 10 mg pills) daily for 14 days on and then 14 days off 28 Tab 5  
 triamcinolone acetonide (KENALOG) 0.1 % topical cream Apply  to affected area two (2) times a day. use thin layer 15 g 1  
 zolpidem (AMBIEN) 10 mg tablet Take 1 Tab by mouth nightly as needed for Sleep. Max Daily Amount: 10 mg. 30 Tab 0  
 gabapentin (NEURONTIN) 100 mg capsule Take 2 Caps by mouth nightly. 60 Cap 2  
 lidocaine-prilocaine (EMLA) topical cream Apply  to affected area as needed for Pain. 30 g 3  cholecalciferol (VITAMIN D3) 400 unit tab tablet Take 2 Tabs by mouth daily. 60 Tab 0  
 ondansetron hcl (ZOFRAN) 8 mg tablet Take 1 Tab by mouth every eight (8) hours as needed for Nausea. 90 Tab 0  
 vit C/E/zinc/lutein/zeaxanthin (OCUVITE EYE HEALTH PO) Take 1 Tab by mouth daily.  acetaminophen 500 mg tab 500 mg, diphenhydrAMINE 25 mg cap 25 mg Take  by mouth nightly.  pravastatin (PRAVACHOL) 10 mg tablet Take  by mouth nightly. Facility-Administered Medications Ordered in Other Encounters Medication Dose Route Frequency Provider Last Rate Last Admin  central line flush (saline) syringe 10-30 mL  10-30 mL InterCATHeter PRN Jeremy Cody MD   10 mL at 12/21/20 1600  central line flush (saline) syringe 10 mL  10 mL InterCATHeter PRN Meggan Whitlock MD   10 mL at 20 1300 OBJECTIVE: 
No data found. Temp (24hrs), Av.2 °F (37.3 °C), Min:99.2 °F (37.3 °C), Max:99.2 °F (37.3 °C) No intake/output data recorded. Physical Exam: 
Constitutional: Well developed, well nourished female in no acute distress, sitting comfortably in the hospital bed. HEENT: Normocephalic and atraumatic. Oropharynx is clear, mucous membranes are moist.  Pupils are equal, round, and reactive to light. Extraocular muscles are intact. Sclerae anicteric. Neck supple without JVD. No thyromegaly present. Lymph node No palpable submandibular, cervical, supraclavicular, axillary or inguinal lymph nodes. Skin Warm and dry. No bruising and no rash noted. No erythema. No pallor. Respiratory Lungs are clear to auscultation bilaterally without wheezes, rales or rhonchi, normal air exchange without accessory muscle use. CVS Normal rate, regular rhythm and normal S1 and S2. No murmurs, gallops, or rubs. Abdomen Soft, nontender and nondistended, normoactive bowel sounds. No palpable mass. No hepatosplenomegaly. Neuro Grossly nonfocal with no obvious sensory or motor deficits. MSK Normal range of motion in general.  No edema and no tenderness. Psych Appropriate mood and affect. Labs:   
Recent Results (from the past 24 hour(s)) CBC WITH AUTOMATED DIFF Collection Time: 20 10:12 AM  
Result Value Ref Range WBC 7.1 4.3 - 11.1 K/uL  
 RBC 2.20 (L) 4.05 - 5.25 M/uL HGB 7.2 (L) 11.7 - 15.4 g/dL HCT 22.0 (L) 35.8 - 46.3 % .0 (H) 79.6 - 97.8 FL  
 MCH 32.7 26.1 - 32.9 PG  
 MCHC 32.7 31.4 - 35.0 g/dL  
 RDW 18.1 (H) 11.9 - 14.6 % PLATELET 5 (LL) 381 - 450 K/uL MPV 11.2 9.4 - 12.3 FL ABSOLUTE NRBC 0.00 0.0 - 0.2 K/uL NEUTROPHILS 6 (L) 47 - 75 % LYMPHOCYTES 18 16 - 44 % MONOCYTES 36 (H) 3 - 9 % PROMYELOCYTES 1 % BLASTS 39 % ABS. NEUTROPHILS 0.5 (L) 1.7 - 8.2 K/UL  
 ABS. LYMPHOCYTES 1.3 0.5 - 4.6 K/UL  
 ABS. MONOCYTES 2.6 (H) 0.1 - 1.3 K/UL  
 RBC COMMENTS SLIGHT 
ANISOCYTOSIS + POIKILOCYTOSIS 
    
 RBC COMMENTS SLIGHT 
MACROCYTOSIS 
    
 RBC COMMENTS OCCASIONAL 
OVALOCYTES 
    
 RBC COMMENTS SLIGHT 
POLYCHROMASIA 
    
 RBC COMMENTS OCCASIONAL 
SPHEROCYTES 
    
 WBC COMMENTS Result Confirmed By Smear PLATELET COMMENTS MARKED    
 DF MANUAL METABOLIC PANEL, COMPREHENSIVE Collection Time: 12/21/20 10:12 AM  
Result Value Ref Range Sodium 141 136 - 145 mmol/L Potassium 4.2 3.5 - 5.1 mmol/L Chloride 109 (H) 98 - 107 mmol/L  
 CO2 26 21 - 32 mmol/L Anion gap 6 (L) 7 - 16 mmol/L Glucose 114 (H) 65 - 100 mg/dL BUN 16 8 - 23 MG/DL Creatinine 0.90 0.6 - 1.0 MG/DL  
 GFR est AA >60 >60 ml/min/1.73m2 GFR est non-AA >60 >60 ml/min/1.73m2 Calcium 9.0 8.3 - 10.4 MG/DL Bilirubin, total 0.3 0.2 - 1.1 MG/DL  
 ALT (SGPT) 14 12 - 65 U/L  
 AST (SGOT) 8 (L) 15 - 37 U/L Alk. phosphatase 117 50 - 136 U/L Protein, total 6.7 6.3 - 8.2 g/dL Albumin 3.6 3.2 - 4.6 g/dL Globulin 3.1 2.3 - 3.5 g/dL A-G Ratio 1.2 1.2 - 3.5 MAGNESIUM Collection Time: 12/21/20 10:12 AM  
Result Value Ref Range Magnesium 2.2 1.8 - 2.4 mg/dL SARS-COV-2 Collection Time: 12/21/20 12:27 PM  
Result Value Ref Range Specimen source Nasopharyngeal    
 COVID-19 rapid test Not detected NOTD    
 SARS CoV-2 PENDING   
URINALYSIS W/ RFLX MICROSCOPIC Collection Time: 12/21/20 12:27 PM  
Result Value Ref Range Color YELLOW Appearance CLEAR Specific gravity 1.020 1.001 - 1.023    
 pH (UA) 7.0 5.0 - 9.0 Protein Negative NEG mg/dL Glucose Negative NEG mg/dL Ketone Negative NEG mg/dL Bilirubin Negative NEG Blood TRACE (A) NEG Urobilinogen 0.2 0.2 - 1.0 EU/dL Nitrites Negative NEG  Leukocyte Esterase Negative NEG    
URINE MICROSCOPIC  
 Collection Time: 12/21/20 12:27 PM  
Result Value Ref Range WBC 0-3 0 /hpf  
 RBC 0-3 0 /hpf Epithelial cells 0-3 0 /hpf Bacteria 1+ (H) 0 /hpf Casts 3-5 0 /lpf Crystals, urine 0 0 /LPF Mucus 0 0 /lpf Imaging: No images are attached to the encounter. ASSESSMENT: 
Problem List  Date Reviewed: 11/25/2020 Codes Class Noted Febrile neutropenia (HCC) ICD-10-CM: D70.9, R50.81 ICD-9-CM: 288.00, 780.61  9/21/2019 Pancytopenia due to antineoplastic chemotherapy [facility]) ICD-10-CM: D61.810, T45.1X5A 
ICD-9-CM: 284.11, E933.1  6/12/2019 Cellulitis of neck ICD-10-CM: Z78.981 ICD-9-CM: 682.1  6/12/2019 Immunocompromised status associated with infection ([facility] ICD-10-CM: B99.9, D84.9 ICD-9-CM: 136.9  6/12/2019 Port or reservoir infection ICD-10-CM: G03.444S ICD-9-CM: 999.33  6/12/2019 Acute myeloid leukemia not having achieved remission ([facility] ICD-10-CM: C92.00 ICD-9-CM: 205.00  5/9/2019 Admission for antineoplastic chemotherapy ICD-10-CM: Z51.11 ICD-9-CM: V58.11  5/5/2019 AML (acute myeloblastic leukemia) ([facility] ICD-10-CM: C92.00 ICD-9-CM: 205.00  4/28/2019 Weakness generalized ICD-10-CM: R53.1 ICD-9-CM: 780.79  4/28/2019 Pancytopenia ([facility] ICD-10-CM: Z19.725 ICD-9-CM: 284.19  4/28/2019 Thrombocytopenia ([facility] ICD-10-CM: D69.6 ICD-9-CM: 287.5  4/27/2019 76 female h/o AML, 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 Seen today in office 12/21/2020. Admits today to having had off-and-on fevers (highest yesterday 100.7) for about a week with associated wheezing at night, using albuterol as needed. Her recent counts remain depressed, thrombocytopenia worse, concern for disease relapse, will repeat BM biopsy. Increase labs to 3 times a week with transfusions as needed. COVID19 screen -ve. She will be admitted to   [facility] for Broad-spectrum antibiotics, as well as panculture. Hopefully will be able to go home prior to [place]. We will also get BM biopsy prior to discharge. PLAN: 
· Pan culture · CXR · Start broad spectrum abx including vanco/cef. If wants to leave before [place] and pt is stable w cultures having stayed -ve then will consider discharge on Zithromax plus augmentin. (has allergy to flouroquinolones) · Gentle hydration · F/u results of COVID confirmatory testing · BM biopsy prior to discharge RTC within a week of discharge  with labs, BM biopsy results. Further therapy options include Glasdegib versus decitabine plus venetoclax. Lab studies and imaging studies (CT/CXR) were personally reviewed. Pertinent old records were reviewed. Thank you for allowing us to participate in the care of Ms. Ronald Guadarrama. Lily Diaz MD 
[address] Office : [phone] Fax : [phone]

## 2020-12-21 NOTE — PROGRESS NOTES
Port accessed per protocol with a 0.75 maynard needle. Flushed with normal saline 10cc. Positive blood return. Labs drawn per order.  Flushed with 10cc of normal saline      Still accessed yes   Dressing applied yes

## 2020-12-22 NOTE — PROGRESS NOTES
Temp 100.6 pt admitted for neutropenic fever. Already had blood and urine cultures at infusion center as out patient, as well as first doses of cefepime and vancomycin. Already received tylenol as pre med for platelets. 2133 temp now 99.2

## 2020-12-22 NOTE — PROGRESS NOTES
Vancomycin Consult MD ordering: Cipriano Ingram NP  
ID following? no Indication: Febrile Neutropenia DOT:  -  days Goal level(s): 15-20 Ht Readings from Last 1 Encounters:  
20 5' 5\" (1.651 m) Wt Readings from Last 1 Encounters:  
20 86.2 kg (190 lb 2 oz) Temp (24hrs), Av.9 °F (37.2 °C), Min:98.6 °F (37 °C), Max:99.2 °F (37.3 °C) Dosing weight: 86.2kg 
76 y.o. Recent Labs  
  20 
1012 BUN 16  
CREA 0.90 WBC 7.1 Estimated Creatinine Clearance: 58.6 mL/min (based on SCr of 0.9 mg/dL). Lab Results Component Value Date/Time Vancomycin,trough 13.2 01/15/2020 01:57 PM  
 
 
Day 1 Assessment and Plan: 
Patient was given Vancomycin 1000 mg at the infusion center earlier today. Will start patient's maintenance dose of 1500 mg as soon as possible tonight. Pharmacy will continue to monitor and follow.  
 
Curtis Diane, FIDENCIOD

## 2020-12-22 NOTE — PROGRESS NOTES
[facility] Hematology & Oncology Inpatient Hematology / Oncology Progress Note Admission Date: 2020  7:07 PM 
Reason for Admission/Hospital Course: Neutropenic fever ([address] [D70.9, R50.81] 24 Hour Events: 
Tmax 100.6 Bone marrow biopsy pending 1 unit platelets yesterday - responded nicely as plt 35k today. No issues over night. ROS: 
Constitutional: + fatigue and + fevers; +wheezing on admission, but improved today. CV:  negative for chest pain, palpitations, edema. Respiratory: negative for dyspnea, cough, wheezing. GI:  negative for nausea, abdominal pain, diarrhea. 10 point review of systems is otherwise negative with the exception of the elements mentioned above in the HPI. Allergies Allergen Reactions  Levaquin [Levofloxacin] Other (comments)  
  tendonitis OBJECTIVE: 
Patient Vitals for the past 8 hrs: 
 BP Temp Pulse Resp SpO2  
20 1023 (!) 123/51 98.8 °F (37.1 °C) 68    
20 1002 125/73 99.1 °F (37.3 °C) 67 18 95 % 20 0945 119/78 99.2 °F (37.3 °C)     
20 0930 118/62 99.2 °F (37.3 °C) 71 18 95 % 20 0753 139/62 98 °F (36.7 °C) 79 19 93 % 20 0246 127/61 98.3 °F (36.8 °C) 81 18 93 % Temp (24hrs), Av.9 °F (37.2 °C), Min:98 °F (36.7 °C), Max:100.6 °F (38.1 °C) 
 
701 -  1900 In: 56 [P.O.:250; I.V.:360] Out: - Physical Exam: 
Constitutional: Well developed, well nourished female in no acute distress, sitting comfortably in the hospital bed. HEENT: Normocephalic and atraumatic. Oropharynx is clear, mucous membranes are moist.  Pupils are equal, round, and reactive to light. Extraocular muscles are intact. Sclerae anicteric. Neck supple without JVD. No thyromegaly present. Lymph node Deferred Skin Warm and dry. No bruising and no rash noted. No erythema. No pallor. Respiratory Lungs are clear to auscultation bilaterally without wheezes, rales or rhonchi, normal air exchange without accessory muscle use. CVS Normal rate, regular rhythm and normal S1 and S2. No murmurs, gallops, or rubs. Abdomen Soft, nontender and nondistended, normoactive bowel sounds. No palpable mass. No hepatosplenomegaly. Neuro Grossly nonfocal with no obvious sensory or motor deficits. MSK Normal range of motion in general.  No edema and no tenderness. Psych Appropriate mood and affect. Labs: 
   
Recent Labs  
  12/22/20 
0304 12/21/20 
1012 WBC 7.9 7.1  
RBC 1.91* 2.20* HGB 6.3* 7.2* HCT 19.3* 22.0*  
.0* 100.0*  
MCH 33.0* 32.7 MCHC 32.6 32.7 RDW 18.4* 18.1*  
PLT 39* 5*  
GRANS 7* 6*  
LYMPH 15* 18 MONOS 4 36* DF MANUAL MANUAL ANEU 1.0* 0.5* ABL 1.2 1.3 ABM 0.3 2.6* Recent Labs  
  12/22/20 
0304 12/21/20 
1012  141  
K 3.9 4.2 * 109* CO2 26 26 AGAP 6* 6*  
GLU 95 114* BUN 14 16 CREA 0.61 0.90 GFRAA >60 >60 GFRNA >60 >60  
CA 8.6 9.0 AP 98 117  
TP 6.0* 6.7 ALB 3.2 3.6 GLOB 2.8 3.1 AGRAT 1.1* 1.2 MG 2.2 2.2 Imaging: 
 
Medications: 
Current Facility-Administered Medications Medication Dose Route Frequency  0.9% sodium chloride infusion 250 mL  250 mL IntraVENous PRN  
 acetaminophen/diphenhydrAMINE (TYLENOL PM EXT STR) 500/25 mg   Oral QHS  acyclovir (ZOVIRAX) tablet 400 mg  400 mg Oral BID  ALPRAZolam (XANAX) tablet 1 mg  1 mg Oral Q8H PRN  cholecalciferol (VITAMIN D3) (400 Units /10 mcg) tablet 2 Tab  800 Units Oral DAILY  DULoxetine (CYMBALTA) capsule 30 mg  30 mg Oral DAILY  gabapentin (NEURONTIN) capsule 200 mg  200 mg Oral QHS  magnesium oxide (MAG-OX) tablet 400 mg  400 mg Oral DAILY  pantoprazole (PROTONIX) tablet 40 mg  40 mg Oral ACB  ondansetron (ZOFRAN ODT) tablet 8 mg  8 mg Oral Q8H PRN  
  potassium chloride (K-DUR, KLOR-CON) SR tablet 20 mEq  20 mEq Oral DAILY  pravastatin (PRAVACHOL) tablet 10 mg  10 mg Oral QHS  voriconazole (VFEND) tablet 200 mg  200 mg Oral Q12H  
 zolpidem (AMBIEN) tablet 10 mg  10 mg Oral QHS PRN  
 cefepime (MAXIPIME) 2 g in 0.9% sodium chloride (MBP/ADV) 100 mL MBP  2 g IntraVENous Q12H  
 0.9% sodium chloride infusion  50 mL/hr IntraVENous CONTINUOUS  
 vancomycin (VANCOCIN) 1500 mg in  ml infusion  1,500 mg IntraVENous Q12H  
 0.9% sodium chloride infusion 250 mL  250 mL IntraVENous PRN  
 diphenhydrAMINE (BENADRYL) capsule 25 mg  25 mg Oral Q6H PRN Facility-Administered Medications Ordered in Other Encounters Medication Dose Route Frequency  central line flush (saline) syringe 10-30 mL  10-30 mL InterCATHeter PRN  
 central line flush (saline) syringe 10 mL  10 mL InterCATHeter PRN  
 
 
 
ASSESSMENT: 
 
Problem List  Date Reviewed: 11/25/2020 Codes Class Noted Neutropenic fever ([facility] ICD-10-CM: D70.9, R50.81 ICD-9-CM: 288.00, 780.61  12/21/2020 Febrile neutropenia (HCC) ICD-10-CM: D70.9, R50.81 ICD-9-CM: 288.00, 780.61  9/21/2019 Pancytopenia due to antineoplastic chemotherapy [facility]) ICD-10-CM: D61.810, T45.1X5A 
ICD-9-CM: 284.11, E933.1  6/12/2019 Cellulitis of neck ICD-10-CM: O81.606 ICD-9-CM: 682.1  6/12/2019 Immunocompromised status associated with infection ([facility] ICD-10-CM: B99.9, D84.9 ICD-9-CM: 136.9  6/12/2019 Port or reservoir infection ICD-10-CM: M51.807U ICD-9-CM: 999.33  6/12/2019 Acute myeloid leukemia not having achieved remission ([facility] ICD-10-CM: C92.00 ICD-9-CM: 205.00  5/9/2019 Admission for antineoplastic chemotherapy ICD-10-CM: Z51.11 ICD-9-CM: V58.11  5/5/2019 AML (acute myeloblastic leukemia) ([facility] ICD-10-CM: C92.00 ICD-9-CM: 205.00  4/28/2019 Weakness generalized ICD-10-CM: R53.1 ICD-9-CM: 780.79  4/28/2019 Pancytopenia ([facility] ICD-10-CM: M69.842 ICD-9-CM: 284.19  4/28/2019 Thrombocytopenia ([facility] ICD-10-CM: D69.6 ICD-9-CM: 287.5  4/27/2019  
   
  
 
 
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 Seen today in office 12/21/2020. Admits today to having had off-and-on fevers (highest yesterday 100.7) for about a week with associated wheezing at night, using albuterol as needed. Her recent counts remain depressed, thrombocytopenia worse, concern for disease relapse, will repeat BM biopsy. Increase labs to 3 times a week with transfusions as needed. COVID19 screen -ve. She will be admitted to   [facility] for Broad-spectrum antibiotics, as well as panculture. Hopefully will be able to go home prior to [place]. We will also get BM biopsy prior to discharge. PLAN: 
· Pan culture · CXR · Start broad spectrum abx including vanco/cef. If wants to leave before [place] and pt is stable w cultures having stayed -ve then will consider discharge on Zithromax plus augmentin. (has allergy to flouroquinolones) · Gentle hydration · F/u results of COVID confirmatory testing · BM biopsy prior to discharge 12/22 D2 Cef/vanc. Feeling much better today. T max  100.6. Blood cultures pending- NGTD. U/A negative. Urine Cx pending. Bone marrow biopsy ordered and pending. Rapid COVID negative, PCR pending. CXR pending. Disposition: She will need follow up with Dr Jennifer Lauren within 1 week of discharge to discuss bone marrow biopsy results if she is discharged prior to them resulting. Goals and plan of care reviewed with the patient. All questions answered to the best of our ability. Rosie Montoya, FNP-C [facility] Hematology and Oncology/Radiation Oncology [address] [place] [address] Office : [phone] Fax : [phone]

## 2020-12-22 NOTE — PROGRESS NOTES
Care Management Interventions PCP Verified by CM: Yes Current Support Network: Lives with Spouse Discharge Location Discharge Placement: Home with family assistance 76 female h/o AML &  multiple therapies. Adm for ABX & panculture. Hopefully will be able to go home prior to [place]. May get BM biopsy prior to d/c. Will follow for any discharge needs.

## 2020-12-22 NOTE — PROGRESS NOTES
Kirstin Del Valle RN & Yoli Vargas RN have performed a head to toe skin assessment. No areas of skin breakdown noted.

## 2020-12-23 NOTE — PROGRESS NOTES
Care Management Interventions PCP Verified by CM: Yes 
Palliative Care Criteria Met (RRAT>21 & CHF Dx)?: No(Dx Neutropenic fever Risk 18%) Transition of Care Consult (CM Consult): Discharge Planning Discharge Durable Medical Equipment: No(quad cane) Physical Therapy Consult: No 
Occupational Therapy Consult: No 
Speech Therapy Consult: No 
Current Support Network: Lives with Spouse Confirm Follow Up Transport: Family Discharge Location Discharge Placement: Home with family assistance Patient admitted for neutropenia with fever. She lives with her spouse in one story home and independent of ADL's but spouse helps as needed. DME includes quad cane and has transportation. She has Medicare and [employer] and obtains medications at [facility] at [phone]. Current d/c plan is home with spouse when medically stable. CM following.

## 2020-12-23 NOTE — H&P
Department of Interventional Radiology  [phone]    History and Physical    Patient:  Kathrine Zendejas MRN:  [id number]  SSN:  [id number]    YOB: 1945  Age:  76 y.o. Sex:  female      Primary Care Provider:  Smitty Hatchet, MD  Referring Physician:  Dick Pereyra MD  Date of Surgery Update:  Kathrine Zendejas was seen and examined. History and physical has been reviewed. The patient has been examined.  There have been no significant clinical changes since the completion of the originally dated History and Physical.    Signed By: Miguel Rossi PA-C     December 23, 2020 8:04 AM

## 2020-12-23 NOTE — PROGRESS NOTES
3 [facility] Hematology & Oncology Inpatient Hematology / Oncology Progress Note Admission Date: 2020  7:07 PM 
Reason for Admission/Hospital Course: Neutropenic fever ([facility] [address] [D70.9, R50.81] 24 Hour Events: 
Tmax 99.6 BMBx this AM 
BC/UC-NGTD Feeling well today, eager to go home ROS: 
Constitutional: + fatigue and fever; improved. CV:  negative for chest pain, palpitations, edema. Respiratory: negative for dyspnea, cough, wheezing. GI:  negative for nausea, abdominal pain, diarrhea. 10 point review of systems is otherwise negative with the exception of the elements mentioned above in the HPI. Allergies Allergen Reactions  Levaquin [Levofloxacin] Other (comments)  
  tendonitis OBJECTIVE: 
Patient Vitals for the past 8 hrs: 
 BP Temp Pulse Resp SpO2  
20 1004 (!) 121/58 98.3 °F (36.8 °C) 64 16 100 % 20 0541 (!) 131/56 97.9 °F (36.6 °C) 70 18 94 % 20 0412 (!) 133/52 98.2 °F (36.8 °C) 63 18 95 % 20 0250 (!) 121/53 99 °F (37.2 °C) 65 18 93 % Temp (24hrs), Av.6 °F (37 °C), Min:97.9 °F (36.6 °C), Max:99.6 °F (37.6 °C) No intake/output data recorded. Physical Exam: 
Constitutional: Well developed, well nourished female in no acute distress, sitting comfortably in the hospital bed. HEENT: Normocephalic and atraumatic. Oropharynx is clear, mucous membranes are moist.   Sclerae anicteric. Neck supple without JVD. No thyromegaly present. Lymph node Deferred Skin Warm and dry. No bruising and no rash noted. No erythema. No pallor. Respiratory Lungs are clear to auscultation bilaterally without wheezes, rales or rhonchi, normal air exchange without accessory muscle use. CVS Normal rate, regular rhythm and normal S1 and S2. No murmurs, gallops, or rubs. Abdomen Soft, nontender and nondistended, normoactive bowel sounds. No palpable mass. No hepatosplenomegaly. Neuro Grossly nonfocal with no obvious sensory or motor deficits. MSK Normal range of motion in general.  No edema and no tenderness. Psych Appropriate mood and affect. Labs: 
   
Recent Labs  
  12/23/20 
0504 12/22/20 
0304 12/21/20 
1012 WBC 8.6 7.9 7.1  
RBC 2.23* 1.91* 2.20* HGB 7.1* 6.3* 7.2* HCT 21.5* 19.3* 22.0*  
MCV 96.4 101.0* 100.0*  
MCH 31.8 33.0* 32.7 MCHC 33.0 32.6 32.7 RDW 19.5* 18.4* 18.1* PLT 60* 39* 5*  
GRANS 3* 7* 6*  
LYMPH 6* 15* 18 MONOS 3 4 36* DF MANUAL MANUAL MANUAL ANEU 0.3* 1.0* 0.5* ABL 0.5 1.2 1.3 ABM 0.3 0.3 2.6* Recent Labs  
  12/23/20 
0504 12/22/20 
0304 12/21/20 
1012  143 141  
K 3.8 3.9 4.2 * 111* 109* CO2 25 26 26 AGAP 6* 6* 6*  
GLU 97 95 114* BUN 11 14 16 CREA 0.63 0.61 0.90 GFRAA >60 >60 >60 GFRNA >60 >60 >60  
CA 8.6 8.6 9.0 AP 92 98 117  
TP 6.0* 6.0* 6.7 ALB 3.2 3.2 3.6 GLOB 2.8 2.8 3.1 AGRAT 1.1* 1.1* 1.2 MG 2.2 2.2 2.2 Imaging: 
 
Medications: 
Current Facility-Administered Medications Medication Dose Route Frequency  acetaminophen (TYLENOL) tablet 650 mg  650 mg Oral Q6H PRN  
 0.9% sodium chloride infusion 250 mL  250 mL IntraVENous PRN  
 cefepime (MAXIPIME) 2 g in 0.9% sodium chloride (MBP/ADV) 100 mL MBP  2 g IntraVENous Q8H  
 0.9% sodium chloride infusion 250 mL  250 mL IntraVENous PRN  
 acetaminophen/diphenhydrAMINE (TYLENOL PM EXT STR) 500/25 mg   Oral QHS  acyclovir (ZOVIRAX) tablet 400 mg  400 mg Oral BID  ALPRAZolam (XANAX) tablet 1 mg  1 mg Oral Q8H PRN  cholecalciferol (VITAMIN D3) (400 Units /10 mcg) tablet 2 Tab  800 Units Oral DAILY  DULoxetine (CYMBALTA) capsule 30 mg  30 mg Oral DAILY  gabapentin (NEURONTIN) capsule 200 mg  200 mg Oral QHS  magnesium oxide (MAG-OX) tablet 400 mg  400 mg Oral DAILY  pantoprazole (PROTONIX) tablet 40 mg  40 mg Oral ACB  ondansetron (ZOFRAN ODT) tablet 8 mg  8 mg Oral Q8H PRN  
  potassium chloride (K-DUR, KLOR-CON) SR tablet 20 mEq  20 mEq Oral DAILY  pravastatin (PRAVACHOL) tablet 10 mg  10 mg Oral QHS  voriconazole (VFEND) tablet 200 mg  200 mg Oral Q12H  
 zolpidem (AMBIEN) tablet 10 mg  10 mg Oral QHS PRN  
 vancomycin (VANCOCIN) 1500 mg in  ml infusion  1,500 mg IntraVENous Q12H  
 0.9% sodium chloride infusion 250 mL  250 mL IntraVENous PRN  
 diphenhydrAMINE (BENADRYL) capsule 25 mg  25 mg Oral Q6H PRN Facility-Administered Medications Ordered in Other Encounters Medication Dose Route Frequency  midazolam (VERSED) injection 0.5-2 mg  0.5-2 mg IntraVENous Multiple  fentaNYL citrate (PF) injection 25-50 mcg  25-50 mcg IntraVENous Multiple  central line flush (saline) syringe 10 mL  10 mL InterCATHeter PRN  
 
 
 
ASSESSMENT: 
 
Problem List  Date Reviewed: 11/25/2020 Codes Class Noted Neutropenic fever ([facility] ICD-10-CM: D70.9, R50.81 ICD-9-CM: 288.00, 780.61  12/21/2020 Febrile neutropenia (HCC) ICD-10-CM: D70.9, R50.81 ICD-9-CM: 288.00, 780.61  9/21/2019 Pancytopenia due to antineoplastic chemotherapy [facility]) ICD-10-CM: D61.810, T45.1X5A 
ICD-9-CM: 284.11, E933.1  6/12/2019 Cellulitis of neck ICD-10-CM: R85.162 ICD-9-CM: 682.1  6/12/2019 Immunocompromised status associated with infection ([facility] ICD-10-CM: B99.9, D84.9 ICD-9-CM: 136.9  6/12/2019 Port or reservoir infection ICD-10-CM: N38.029I ICD-9-CM: 999.33  6/12/2019 Acute myeloid leukemia not having achieved remission ([facility] ICD-10-CM: C92.00 ICD-9-CM: 205.00  5/9/2019 Admission for antineoplastic chemotherapy ICD-10-CM: Z51.11 ICD-9-CM: V58.11  5/5/2019 AML (acute myeloblastic leukemia) ([facility] ICD-10-CM: C92.00 ICD-9-CM: 205.00  4/28/2019 Weakness generalized ICD-10-CM: R53.1 ICD-9-CM: 780.79  4/28/2019 Pancytopenia ([facility] ICD-10-CM: N79.587 ICD-9-CM: 284.19  4/28/2019 Thrombocytopenia ([facility] [address] ICD-10-CM: D69.6 ICD-9-CM: 287.5  4/27/2019  
   
  
 
 
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 Seen today in office 12/21/2020. Admits today to having had off-and-on fevers (highest yesterday 100.7) for about a week with associated wheezing at night, using albuterol as needed. Her recent counts remain depressed, thrombocytopenia worse, concern for disease relapse, will repeat BM biopsy. Increase labs to 3 times a week with transfusions as needed. COVID19 screen -ve. She will be admitted to   [facility] for Broad-spectrum antibiotics, as well as panculture. Hopefully will be able to go home prior to [place]. We will also get BM biopsy prior to discharge. PLAN: 
· Pan culture · CXR · Start broad spectrum abx including vanco/cef. If wants to leave before [place] and pt is stable w cultures having stayed -ve then will consider discharge on Zithromax plus augmentin. (has allergy to flouroquinolones) · Gentle hydration · F/u results of COVID confirmatory testing · BM biopsy prior to discharge 12/22 D2 Cef/vanc. Feeling much better today. T max  100.6. Blood cultures pending- NGTD. U/A negative. Urine Cx pending. Bone marrow biopsy ordered and pending. Rapid COVID negative, PCR pending. CXR pending. 12/23 Afebrile, continue Cef/Vanc, ANC 0.3, Hgb 7.1, Plt 60K, BMBx completed this AM.  If she remains afebrile over next 24 hours we will plan to discharge her tomorrow with dual antibiotic coverage. Disposition: She will need follow up with Dr Oziel Pandey within 1 week of discharge to discuss bone marrow biopsy results if she is discharged prior to them resulting. Goals and plan of care reviewed with the patient. All questions answered to the best of our ability.  
 
   
 
  
Ricardo Mario NP 
 [facility] Hematology and Oncology [address] [address] Office : [phone] Fax : [phone]

## 2020-12-23 NOTE — PROGRESS NOTES
Pharmacokinetic Consult to Pharmacist 
 
Ljza Mchugh is a 76 y.o. female being treated for febrile neutropenia with vancomycin. Weight: 93.5 kg (206 lb 3.2 oz) Lab Results Component Value Date/Time BUN 11 12/23/2020 05:04 AM  
 Creatinine 0.63 12/23/2020 05:04 AM  
 WBC 8.6 12/23/2020 05:04 AM  
 Procalcitonin 0.1 09/21/2019 06:50 PM  
 Lactic Acid (POC) 0.67 09/21/2019 08:51 PM  
  
Estimated Creatinine Clearance: 78.5 mL/min (by C-G formula based on SCr of 0.63 mg/dL). Lab Results Component Value Date/Time Vancomycin,trough 18.9 12/23/2020 10:38 AM  
 
 
Day 3 of vancomycin. Goal trough is 15-20. Trough resulted at high end of therapeutic range. Given level and history of requiring 1-1.25g q12h, will reduce dose to 1250 mg IV q12h. Further levels will be ordered as clinically indicated. Pharmacy will continue to follow. Please call with any questions. Thank you, Beatris Meade, PharmD, [facility] Clinical Pharmacist 
[phone]

## 2020-12-23 NOTE — PROGRESS NOTES
Problem: Falls - Risk of 
Goal: *Absence of Falls Description: [facility] and appropriate interventions in the flowsheet. Outcome: Progressing Towards Goal 
Note: Fall Risk Interventions: 
Mobility Interventions: Communicate number of staff needed for ambulation/transfer, Patient to call before getting OOB Medication Interventions: Assess postural VS orthostatic hypotension, Patient to call before getting OOB, Teach patient to arise slowly History of Falls Interventions: Door open when patient unattended Problem: Patient Education: Go to Patient Education Activity Goal: Patient/Family Education Outcome: Progressing Towards Goal

## 2020-12-23 NOTE — PROGRESS NOTES
TRANSFER - IN REPORT: 
 
Verbal report received from Lyndon(name) on Elias Hurst  being received from IR(unit) for routine progression of care Report consisted of patients Situation, Background, Assessment and  
Recommendations(SBAR). Information from the following report(s) SBAR was reviewed with the receiving nurse. Opportunity for questions and clarification was provided. Assessment completed upon patients arrival to unit and care assumed.

## 2020-12-23 NOTE — PROGRESS NOTES
End of Shift Note: 
 
- Pt received 1 unit of platelets; tolerated well. - Pt going down to IR this morning for bone marrow biopsy.

## 2020-12-23 NOTE — DISCHARGE SUMMARY
[facility]: Inpatient Hematology / Oncology Discharge Summary Note Patient ID: Celina Castillo [id number] 
64 y.o. 
1945 Admit Date: 12/21/2020 Discharge Date: 12/24/2020 Admission Diagnoses: Neutropenic fever ([facility] [address] [D70.9, R50.81] Discharge Diagnoses: 
Principal Diagnosis: <principal problem not specified> Active Problems: 
  Neutropenic fever ([address] (12/21/2020) Hospital Course: 
Mrs Saige Ochoa was admitted on 12/21/2020 from the office for neutropenic fever. She received Vancomycin and Cefepime and cultures remain negative. She has been afebrile the past 48 hours and desires to be discharged home. She will continue on PO Augmentin and Azithromycin in addition to continuing on acyclovir and Vfend. Due to worsening cytopenias and transfusion needs a repeat BMBx was completed on 12/23. She received a total of 2 units PRBCs and 1 unit platelets during admission. On day of discharge her [address] is 0.3, Hgb 8.6, and platelets 56R. She is scheduled for labs/replacements on 12/28 and follow up with Dr Rebekah Carmona to discussed BMBx results on 12/31. She was reminded to call with any fevers, bleeding or other concerns. Consults: 
IP CONSULT TO INTERVENTIONAL RADIOLOGY Pertinent Diagnostic Studies:  
Labs:   
Recent Labs  
  12/24/20 
0517 12/23/20 
0504 12/22/20 
0304 WBC 10.8 8.6 7.9 HGB 8.6* 7.1* 6.3*  
PLT 54* 60* 39* ANEU 0.3* 0.3* 1.0* Recent Labs  
  12/24/20 
0517 12/23/20 
0504 12/22/20 
0304  143 143  
K 4.1 3.8 3.9 * 112* 111* CO2 25 25 26 GLU 96 97 95 BUN 12 11 14 CREA 0.71 0.63 0.61  
CA 8.9 8.6 8.6 AP 97 92 98  
TP 6.1* 6.0* 6.0* ALB 3.2 3.2 3.2 MG 2.2 2.2 2.2 Imaging: 
Two view chest 
  
History: Evaluate for infiltrate / first febrile episode.  
  
Comparison: 12/05/2019 
  
Findings: A left internal jugular Mediport catheter is unchanged.  The heart and 
 mediastinal silhouette are normal in size and configuration. The lungs and 
pleural spaces are clear. The pulmonary vascularity is within normal limits. The 
visualized osseous structures are unremarkable. 
  
IMPRESSION Impression: No active disease in the chest. 
  
  
  
 
Current Discharge Medication List  
  
START taking these medications Details  
amoxicillin-clavulanate (Augmentin) 875-125 mg per tablet Take 1 Tab by mouth every twelve (12) hours for 7 days. Qty: 14 Tab, Refills: 0  
  
azithromycin (ZITHROMAX) 500 mg tab Take 1 Tab by mouth daily for 7 days. Qty: 7 Tab, Refills: 0 CONTINUE these medications which have NOT CHANGED Details  
magnesium oxide (MAG-OX) 400 mg tablet Take 1 Tab by mouth daily. Qty: 90 Tab, Refills: 3  
  
alprazolam (XANAX PO) Take 1 Tab by mouth every eight (8) hours as needed. voriconazole (VFEND) 200 mg tablet TAKE 1 TABLET BY MOUTH EVERY 12 HOURS Qty: 60 Tab, Refills: 3 Associated Diagnoses: Acute myeloid leukemia not having achieved remission ([address] Immunocompromised ([address] potassium chloride (K-DUR, KLOR-CON) 20 mEq tablet Take 1 Tab by mouth daily. Indications: prevention of low potassium in the blood Qty: 30 Tab, Refills: 2 Associated Diagnoses: Hypokalemia DULoxetine (Cymbalta) 30 mg capsule Take 1 Cap by mouth daily. Qty: 30 Cap, Refills: 5  
  
acyclovir (ZOVIRAX) 400 mg tablet Take 1 Tab by mouth two (2) times a day. Qty: 60 Tab, Refills: 11  
  
zolpidem (AMBIEN) 10 mg tablet Take 1 Tab by mouth nightly as needed for Sleep. Max Daily Amount: 10 mg. 
Qty: 30 Tab, Refills: 0 Associated Diagnoses: Acute myeloid leukemia not having achieved remission ([address] Insomnia, unspecified type  
  
lidocaine-prilocaine (EMLA) topical cream Apply  to affected area as needed for Pain. Qty: 30 g, Refills: 3 cholecalciferol (VITAMIN D3) 400 unit tab tablet Take 2 Tabs by mouth daily. Qty: 60 Tab, Refills: 0 ondansetron hcl (ZOFRAN) 8 mg tablet Take 1 Tab by mouth every eight (8) hours as needed for Nausea. Qty: 90 Tab, Refills: 0  
  
vit C/E/zinc/lutein/zeaxanthin ([address] PO) Take 1 Tab by mouth daily. pravastatin (PRAVACHOL) 10 mg tablet Take 20 mg by mouth nightly. omeprazole (PRILOSEC) 20 mg capsule Take 1 Cap by mouth daily. Qty: 90 Cap, Refills: 3  
  
triamcinolone acetonide (KENALOG) 0.1 % topical cream Apply  to affected area two (2) times a day. use thin layer Qty: 15 g, Refills: 1  
  
gabapentin (NEURONTIN) 100 mg capsule Take 2 Caps by mouth nightly. Qty: 60 Cap, Refills: 2  
  
acetaminophen 500 mg tab 500 mg, diphenhydrAMINE 25 mg cap 25 mg Take  by mouth nightly. STOP taking these medications  
  
 venetoclax 50 mg tab Comments:  
Reason for Stopping:   
   
 venetoclax 10 mg tab Comments:  
Reason for Stopping: OBJECTIVE: 
Patient Vitals for the past 8 hrs: 
 BP Temp Pulse Resp SpO2  
20 0800 139/62 98.6 °F (37 °C) 66 16 98 % 20 0233 (!) 144/61 99.5 °F (37.5 °C) 66 16 97 % Temp (24hrs), Av.5 °F (36.9 °C), Min:98 °F (36.7 °C), Max:99.5 °F (37.5 °C) No intake/output data recorded. Physical Exam: 
Constitutional: Well developed, well nourished female in no acute distress, sitting comfortably in the bedside chair. HEENT: Normocephalic and atraumatic. Oropharynx is clear, mucous membranes are moist.  Sclerae anicteric. Neck supple without JVD. No thyromegaly present. Skin Warm and dry. No bruising and no rash noted. No erythema. No pallor. Respiratory Lungs are clear to auscultation bilaterally without wheezes, rales or rhonchi, normal air exchange without accessory muscle use. CVS Normal rate, regular rhythm and normal S1 and S2. No murmurs, gallops, or rubs. Abdomen Soft, nontender and nondistended, normoactive bowel sounds. No palpable mass. Neuro Grossly nonfocal with no obvious sensory or motor deficits. MSK Normal range of motion in general.  No edema and no tenderness. Psych Appropriate mood and affect. ASSESSMENT: 
 
Active Problems: 
  Neutropenic fever ([address] (12/21/2020) DISPOSITION: 
Follow-up Appointments Procedures  FOLLOW UP VISIT Appointment in: Other (Specify) She is scheduled for labs/replacements on 12/28 and with Dr Germán Grigsby on 12/31. She is scheduled for labs/replacements on 12/28 and with Dr Germán Grigsby on 12/31. Standing Status:   Standing Number of Occurrences:   1 Order Specific Question:   Appointment in Answer: Other (Specify) Over 30 minutes was spent in discharge planning and coordination of care. Richard Freitas NP [facility] Hematology and Oncology [address] [place] [address] Office : [phone] Fax : [phone]

## 2020-12-23 NOTE — PROCEDURES
Department of Interventional Radiology  [phone]        Interventional Radiology Brief Procedure Note    Patient: Sharron Evans MRN: [id number]  SSN: [id number]    YOB: 1945  Age: 76 y.o. Sex: female      Date of Procedure: 12/23/2020    Pre-Procedure Diagnosis: bone marrow biopsy    Post-Procedure Diagnosis: SAME    Procedure(s): Image Guided Biopsy    Brief Description of Procedure: CT guided bone marrow biopsy    Performed By: Marisa Arteaga PA-C     Assistants: None    Anesthesia:Moderate Sedation per MADELINE Robin MD    Estimated Blood Loss: Less than 10ml    Specimens:  aspirate and core    Implants:  None    Findings: no post bx bleeding    Complications: None    Recommendations: routine wound care     Follow Up: prn    Signed By: Marisa Arteaga PA-C     December 23, 2020

## 2020-12-24 NOTE — PROGRESS NOTES
Discharge Note: 
 
 Gave pt discharge instructions, de-accessed port, Reviewed new and existing medications, reviewed all follow up appts. All pts questions and concerns were addressed. Pt left unit via wheelchair.

## 2020-12-24 NOTE — PROGRESS NOTES
Care Management Interventions PCP Verified by CM: Yes 
Palliative Care Criteria Met (RRAT>21 & CHF Dx)?: No(Dx Neutropenic fever Risk 18%) Mode of Transport at Discharge: Self Transition of Care Consult (CM Consult): Discharge Planning Discharge Durable Medical Equipment: No 
Physical Therapy Consult: No 
Occupational Therapy Consult: No 
Speech Therapy Consult: No 
Current Support Network: Own Home, Lives with Spouse Confirm Follow Up Transport: Family Discharge Location Discharge Placement: Home with family assistance Patient to be discharged home with family today. No discharge needs identified. CM will remain available should needs arise.

## 2020-12-31 NOTE — PROGRESS NOTES
12/31/2020- Patient seen in the office today. She is feeling well other than a little tired and having loose stool related to the antibiotics. MD ok with her d/c Augmentin and will continue the Azithromax. Platelets are a 3 today so she will receive 2 units on platelets. Bone marrow reviewed with patient and  showing  80 % involvement that proved relapse. MD reviewed some treatment options with the patient. She is not disagreeable to any of them. MD will discuss with pharmacist and then plan from there. No other needs at this time.

## 2020-12-31 NOTE — PROGRESS NOTES
Arrived to the [facility]. 2 units PLT completed for platelet count of 3. Patient tolerated well. Any issues or concerns during appointment: none. Patient aware of next infusion appointment on 1/2 at 1:30pm. 
Discharged via cane to home.

## 2021-01-01 ENCOUNTER — HOSPITAL ENCOUNTER (OUTPATIENT)
Dept: INFUSION THERAPY | Age: 76
Discharge: HOME | End: 2021-01-02
Payer: MEDICARE

## 2021-01-01 ENCOUNTER — HOSPITAL ENCOUNTER (OUTPATIENT)
Dept: INFUSION THERAPY | Age: 76
Discharge: HOME | End: 2021-01-04
Payer: MEDICARE

## 2021-01-01 ENCOUNTER — APPOINTMENT (OUTPATIENT)
Dept: GENERAL RADIOLOGY | Age: 76
End: 2021-01-01
Attending: EMERGENCY MEDICINE
Payer: MEDICARE

## 2021-01-01 ENCOUNTER — HOSPITAL ENCOUNTER (OUTPATIENT)
Dept: LAB | Age: 76
Discharge: HOME | End: 2021-01-04

## 2021-01-01 ENCOUNTER — HOSPITAL ENCOUNTER (EMERGENCY)
Age: 76
End: 2021-01-09
Attending: EMERGENCY MEDICINE
Payer: MEDICARE

## 2021-01-01 ENCOUNTER — HOSPITAL ENCOUNTER (EMERGENCY)
Dept: CT IMAGING | Age: 76
Discharge: HOME | End: 2021-01-09
Attending: EMERGENCY MEDICINE
Payer: MEDICARE

## 2021-01-01 ENCOUNTER — HOSPITAL ENCOUNTER (OUTPATIENT)
Dept: INFUSION THERAPY | Age: 76
End: 2021-01-01

## 2021-01-01 ENCOUNTER — APPOINTMENT (OUTPATIENT)
Dept: INFUSION THERAPY | Age: 76
End: 2021-01-01

## 2021-01-01 VITALS
SYSTOLIC BLOOD PRESSURE: 135 MMHG | HEART RATE: 83 BPM | RESPIRATION RATE: 12 BRPM | OXYGEN SATURATION: 97 % | DIASTOLIC BLOOD PRESSURE: 62 MMHG | WEIGHT: 203 LBS | HEIGHT: 65 IN | BODY MASS INDEX: 33.82 KG/M2

## 2021-01-01 VITALS
SYSTOLIC BLOOD PRESSURE: 183 MMHG | HEART RATE: 100 BPM | RESPIRATION RATE: 18 BRPM | DIASTOLIC BLOOD PRESSURE: 81 MMHG | TEMPERATURE: 97.5 F | OXYGEN SATURATION: 93 %

## 2021-01-01 VITALS
HEART RATE: 90 BPM | TEMPERATURE: 97.2 F | SYSTOLIC BLOOD PRESSURE: 148 MMHG | OXYGEN SATURATION: 94 % | DIASTOLIC BLOOD PRESSURE: 53 MMHG | RESPIRATION RATE: 16 BRPM

## 2021-01-01 DIAGNOSIS — C92.00 ACUTE MYELOID LEUKEMIA NOT HAVING ACHIEVED REMISSION (HCC): ICD-10-CM

## 2021-01-01 DIAGNOSIS — C92.00 ACUTE MYELOID LEUKEMIA NOT HAVING ACHIEVED REMISSION (HCC): Primary | ICD-10-CM

## 2021-01-01 DIAGNOSIS — J96.00 ACUTE RESPIRATORY FAILURE, UNSPECIFIED WHETHER WITH HYPOXIA OR HYPERCAPNIA (HCC): ICD-10-CM

## 2021-01-01 DIAGNOSIS — I62.9 INTRACRANIAL HEMORRHAGE (HCC): Primary | ICD-10-CM

## 2021-01-01 DIAGNOSIS — N17.9 AKI (ACUTE KIDNEY INJURY) (HCC): ICD-10-CM

## 2021-01-01 DIAGNOSIS — D69.6 THROMBOCYTOPENIA (HCC): ICD-10-CM

## 2021-01-01 DIAGNOSIS — E87.20 LACTIC ACIDOSIS: ICD-10-CM

## 2021-01-01 DIAGNOSIS — I61.9 STROKE, HEMORRHAGIC (HCC): ICD-10-CM

## 2021-01-01 DIAGNOSIS — T68.XXXA HYPOTHERMIA, INITIAL ENCOUNTER: ICD-10-CM

## 2021-01-01 DIAGNOSIS — J96.01 ACUTE RESPIRATORY FAILURE WITH HYPOXIA (HCC): ICD-10-CM

## 2021-01-01 DIAGNOSIS — D61.818 PANCYTOPENIA (HCC): ICD-10-CM

## 2021-01-01 LAB
ABO + RH BLD: NORMAL
ALBUMIN SERPL-MCNC: 3 G/DL (ref 3.2–4.6)
ALBUMIN SERPL-MCNC: 4.1 G/DL (ref 3.2–4.6)
ALBUMIN SERPL-MCNC: 4.2 G/DL (ref 3.2–4.6)
ALBUMIN/GLOB SERPL: 1.2 {RATIO} (ref 1.2–3.5)
ALBUMIN/GLOB SERPL: 1.4 {RATIO} (ref 1.2–3.5)
ALBUMIN/GLOB SERPL: 1.5 {RATIO} (ref 1.2–3.5)
ALP SERPL-CCNC: 146 U/L (ref 50–136)
ALP SERPL-CCNC: 149 U/L (ref 50–136)
ALP SERPL-CCNC: 157 U/L (ref 50–136)
ALT SERPL-CCNC: 19 U/L (ref 12–65)
ALT SERPL-CCNC: 19 U/L (ref 12–65)
ALT SERPL-CCNC: 23 U/L (ref 12–65)
ANION GAP SERPL CALC-SCNC: 18 MMOL/L (ref 7–16)
ANION GAP SERPL CALC-SCNC: 6 MMOL/L (ref 7–16)
ANION GAP SERPL CALC-SCNC: 7 MMOL/L (ref 7–16)
APTT PPP: 38.3 SEC (ref 24.1–35.1)
ARTERIAL PATENCY WRIST A: YES
AST SERPL-CCNC: 17 U/L (ref 15–37)
AST SERPL-CCNC: 23 U/L (ref 15–37)
AST SERPL-CCNC: 28 U/L (ref 15–37)
BASE DEFICIT BLD-SCNC: 15 MMOL/L
BDY SITE: ABNORMAL
BILIRUB SERPL-MCNC: 0.2 MG/DL (ref 0.2–1.1)
BILIRUB SERPL-MCNC: 0.3 MG/DL (ref 0.2–1.1)
BILIRUB SERPL-MCNC: 0.3 MG/DL (ref 0.2–1.1)
BLASTS NFR BLD MANUAL: 53 %
BLASTS NFR BLD MANUAL: 76 %
BLASTS NFR BLD MANUAL: 84 %
BLD PROD TYP BPU: NORMAL
BLOOD BANK CMNT PATIENT-IMP: NORMAL
BLOOD GROUP ANTIBODIES SERPL: NORMAL
BNP SERPL-MCNC: 1453 PG/ML
BPU ID: NORMAL
BUN SERPL-MCNC: 19 MG/DL (ref 8–23)
BUN SERPL-MCNC: 19 MG/DL (ref 8–23)
BUN SERPL-MCNC: 25 MG/DL (ref 8–23)
CALCIUM SERPL-MCNC: 8.4 MG/DL (ref 8.3–10.4)
CALCIUM SERPL-MCNC: 9.5 MG/DL (ref 8.3–10.4)
CALCIUM SERPL-MCNC: 9.7 MG/DL (ref 8.3–10.4)
CHLORIDE SERPL-SCNC: 107 MMOL/L (ref 98–107)
CHLORIDE SERPL-SCNC: 107 MMOL/L (ref 98–107)
CHLORIDE SERPL-SCNC: 110 MMOL/L (ref 98–107)
CITRATED FUNCTIONAL FIBRINOGEN MAXIMUM AMPLITUDE: <4 MM (ref 15–32)
CITRATED KAOLIN LY30: 0 % (ref 0–2.6)
CITRATED KAOLIN R-TIME: >17 MINS (ref 4.6–9.1)
CITRATED RAPIDTEG MAXIMUM AMPLITUDE: <40 MM (ref 52–70)
CK SERPL-CCNC: 202 U/L (ref 21–215)
CO2 BLD-SCNC: 17 MMOL/L
CO2 SERPL-SCNC: 16 MMOL/L (ref 21–32)
CO2 SERPL-SCNC: 26 MMOL/L (ref 21–32)
CO2 SERPL-SCNC: 26 MMOL/L (ref 21–32)
COLLECT TIME,HTIME: 905
CREAT SERPL-MCNC: 0.9 MG/DL (ref 0.6–1)
CREAT SERPL-MCNC: 1 MG/DL (ref 0.6–1)
CREAT SERPL-MCNC: 1.93 MG/DL (ref 0.6–1)
CROSSMATCH RESULT,%XM: NORMAL
D DIMER PPP FEU-MCNC: >20 UG/ML(FEU)
DIFFERENTIAL METHOD BLD: ABNORMAL
EOSINOPHIL # BLD: 0.5 K/UL (ref 0–0.8)
EOSINOPHIL NFR BLD MANUAL: 1 % (ref 1–8)
ERYTHROCYTE [DISTWIDTH] IN BLOOD BY AUTOMATED COUNT: 18 % (ref 11.9–14.6)
ERYTHROCYTE [DISTWIDTH] IN BLOOD BY AUTOMATED COUNT: 18.3 % (ref 11.9–14.6)
ERYTHROCYTE [DISTWIDTH] IN BLOOD BY AUTOMATED COUNT: 19.7 % (ref 11.9–14.6)
EXHALED MINUTE VOLUME, VE: 10 L/MIN
FLOW CYTOMETRY, FBTC1: NORMAL
GAS FLOW.O2 O2 DELIVERY SYS: ABNORMAL L/MIN
GAS FLOW.O2 SETTING OXYMISER: 20 BPM
GLOBULIN SER CALC-MCNC: 2.5 G/DL (ref 2.3–3.5)
GLOBULIN SER CALC-MCNC: 2.8 G/DL (ref 2.3–3.5)
GLOBULIN SER CALC-MCNC: 2.9 G/DL (ref 2.3–3.5)
GLUCOSE BLD STRIP.AUTO-MCNC: 357 MG/DL (ref 65–100)
GLUCOSE SERPL-MCNC: 111 MG/DL (ref 65–100)
GLUCOSE SERPL-MCNC: 282 MG/DL (ref 65–100)
GLUCOSE SERPL-MCNC: 99 MG/DL (ref 65–100)
HCO3 BLD-SCNC: 15.3 MMOL/L (ref 22–26)
HCT VFR BLD AUTO: 20.9 % (ref 35.8–46.3)
HCT VFR BLD AUTO: 24.1 % (ref 35.8–46.3)
HCT VFR BLD AUTO: 24.1 % (ref 35.8–46.3)
HGB BLD-MCNC: 6.2 G/DL (ref 11.7–15.4)
HGB BLD-MCNC: 8 G/DL (ref 11.7–15.4)
HGB BLD-MCNC: 8.1 G/DL (ref 11.7–15.4)
HISTORY CHECKED?,CKHIST: NORMAL
INR PPP: 1.9
INSPIRATION.DURATION SETTING TIME VENT: 1 SEC
LACTATE SERPL-SCNC: 12.7 MMOL/L (ref 0.4–2)
LIPASE SERPL-CCNC: 90 U/L (ref 73–393)
LYMPHOCYTES # BLD: 4.5 K/UL (ref 0.5–4.6)
LYMPHOCYTES # BLD: 5.4 K/UL (ref 0.5–4.6)
LYMPHOCYTES # BLD: 89.7 K/UL (ref 0.5–4.6)
LYMPHOCYTES NFR BLD MANUAL: 10 % (ref 16–44)
LYMPHOCYTES NFR BLD MANUAL: 12 % (ref 16–44)
LYMPHOCYTES NFR BLD MANUAL: 44 % (ref 16–44)
MAGNESIUM SERPL-MCNC: 2.1 MG/DL (ref 1.8–2.4)
MAGNESIUM SERPL-MCNC: 2.4 MG/DL (ref 1.8–2.4)
MCH RBC QN AUTO: 30.5 PG (ref 26.1–32.9)
MCH RBC QN AUTO: 31 PG (ref 26.1–32.9)
MCH RBC QN AUTO: 31.2 PG (ref 26.1–32.9)
MCHC RBC AUTO-ENTMCNC: 29.7 G/DL (ref 31.4–35)
MCHC RBC AUTO-ENTMCNC: 33.2 G/DL (ref 31.4–35)
MCHC RBC AUTO-ENTMCNC: 33.6 G/DL (ref 31.4–35)
MCV RBC AUTO: 103 FL (ref 79.6–97.8)
MCV RBC AUTO: 92.7 FL (ref 79.6–97.8)
MCV RBC AUTO: 93.4 FL (ref 79.6–97.8)
MONOCYTES # BLD: 1.4 K/UL (ref 0.1–1.3)
MONOCYTES # BLD: 4.5 K/UL (ref 0.1–1.3)
MONOCYTES NFR BLD MANUAL: 10 % (ref 3–9)
MONOCYTES NFR BLD MANUAL: 3 % (ref 3–9)
MYELOCYTES NFR BLD MANUAL: 1 %
MYELOCYTES NFR BLD MANUAL: 2 %
NEUTS SEG # BLD: 0.9 K/UL (ref 1.7–8.2)
NEUTS SEG # BLD: 0.9 K/UL (ref 1.7–8.2)
NEUTS SEG # BLD: 6.1 K/UL (ref 1.7–8.2)
NEUTS SEG NFR BLD MANUAL: 2 % (ref 47–75)
NRBC # BLD: 0 K/UL (ref 0–0.2)
O2/TOTAL GAS SETTING VFR VENT: 100 %
PCO2 BLD: 65.8 MMHG (ref 35–45)
PEEP RESPIRATORY: 8 CMH2O
PH BLD: 6.98 [PH] (ref 7.35–7.45)
PLATELET # BLD AUTO: 42 K/UL (ref 150–450)
PLATELET # BLD AUTO: 65 K/UL (ref 150–450)
PLATELET # BLD AUTO: 8 K/UL (ref 150–450)
PLATELET COMMENTS,PCOM: ABNORMAL
PMV BLD AUTO: 8.7 FL (ref 9.4–12.3)
PMV BLD AUTO: 8.9 FL (ref 9.4–12.3)
PMV BLD AUTO: 9.2 FL (ref 9.4–12.3)
PO2 BLD: 354 MMHG (ref 75–100)
POTASSIUM SERPL-SCNC: 4.1 MMOL/L (ref 3.5–5.1)
POTASSIUM SERPL-SCNC: 4.3 MMOL/L (ref 3.5–5.1)
POTASSIUM SERPL-SCNC: 4.7 MMOL/L (ref 3.5–5.1)
PROCALCITONIN SERPL-MCNC: 1.29 NG/ML
PROMYELOCYTES NFR BLD MANUAL: 2 %
PROT SERPL-MCNC: 5.5 G/DL (ref 6.3–8.2)
PROT SERPL-MCNC: 7 G/DL (ref 6.3–8.2)
PROT SERPL-MCNC: 7 G/DL (ref 6.3–8.2)
PROTHROMBIN TIME: 22.1 SEC (ref 12.5–14.7)
RBC # BLD AUTO: 2.03 M/UL (ref 4.05–5.2)
RBC # BLD AUTO: 2.58 M/UL (ref 4.05–5.25)
RBC # BLD AUTO: 2.6 M/UL (ref 4.05–5.25)
RBC MORPH BLD: ABNORMAL
SAO2 % BLD: 100 % (ref 95–98)
SERVICE CMNT-IMP: ABNORMAL
SERVICE CMNT-IMP: ABNORMAL
SODIUM SERPL-SCNC: 139 MMOL/L (ref 136–145)
SODIUM SERPL-SCNC: 140 MMOL/L (ref 136–145)
SODIUM SERPL-SCNC: 144 MMOL/L (ref 136–145)
SPECIMEN EXP DATE BLD: NORMAL
SPECIMEN SOURCE: NORMAL
SPECIMEN TYPE: ABNORMAL
STATUS OF UNIT,%ST: NORMAL
TEST ORDERED:: NORMAL
TROPONIN-HIGH SENSITIVITY: 25.3 PG/ML (ref 0–14)
TSH SERPL DL<=0.005 MIU/L-ACNC: 1.29 UIU/ML (ref 0.36–3.74)
UNIT DIVISION, %UDIV: 0
VENTILATION MODE VENT: ABNORMAL
VT SETTING VENT: 450 ML
WBC # BLD AUTO: 203.9 K/UL (ref 4.3–11.1)
WBC # BLD AUTO: 45.2 K/UL (ref 4.3–11.1)
WBC # BLD AUTO: 45.3 K/UL (ref 4.3–11.1)
WBC MORPH BLD: ABNORMAL

## 2021-01-01 PROCEDURE — 85025 COMPLETE CBC W/AUTO DIFF WBC: CPT

## 2021-01-01 PROCEDURE — 99285 EMERGENCY DEPT VISIT HI MDM: CPT

## 2021-01-01 PROCEDURE — 99211 OFF/OP EST MAY X REQ PHY/QHP: CPT

## 2021-01-01 PROCEDURE — 84145 PROCALCITONIN (PCT): CPT

## 2021-01-01 PROCEDURE — 80053 COMPREHEN METABOLIC PANEL: CPT

## 2021-01-01 PROCEDURE — 36591 DRAW BLOOD OFF VENOUS DEVICE: CPT

## 2021-01-01 PROCEDURE — 74011000250 HC RX REV CODE- 250: Performed by: EMERGENCY MEDICINE

## 2021-01-01 PROCEDURE — 70450 CT HEAD/BRAIN W/O DYE: CPT

## 2021-01-01 PROCEDURE — 74011250636 HC RX REV CODE- 250/636: Performed by: EMERGENCY MEDICINE

## 2021-01-01 PROCEDURE — 36415 COLL VENOUS BLD VENIPUNCTURE: CPT

## 2021-01-01 PROCEDURE — 83735 ASSAY OF MAGNESIUM: CPT

## 2021-01-01 PROCEDURE — 84443 ASSAY THYROID STIM HORMONE: CPT

## 2021-01-01 PROCEDURE — 82962 GLUCOSE BLOOD TEST: CPT

## 2021-01-01 PROCEDURE — 87150 DNA/RNA AMPLIFIED PROBE: CPT

## 2021-01-01 PROCEDURE — 83690 ASSAY OF LIPASE: CPT

## 2021-01-01 PROCEDURE — 87186 SC STD MICRODIL/AGAR DIL: CPT

## 2021-01-01 PROCEDURE — 86644 CMV ANTIBODY: CPT

## 2021-01-01 PROCEDURE — 72125 CT NECK SPINE W/O DYE: CPT

## 2021-01-01 PROCEDURE — 93005 ELECTROCARDIOGRAM TRACING: CPT | Performed by: EMERGENCY MEDICINE

## 2021-01-01 PROCEDURE — 85610 PROTHROMBIN TIME: CPT

## 2021-01-01 PROCEDURE — 87185 SC STD ENZYME DETCJ PER NZM: CPT

## 2021-01-01 PROCEDURE — 96366 THER/PROPH/DIAG IV INF ADDON: CPT

## 2021-01-01 PROCEDURE — 87635 SARS-COV-2 COVID-19 AMP PRB: CPT

## 2021-01-01 PROCEDURE — 85576 BLOOD PLATELET AGGREGATION: CPT

## 2021-01-01 PROCEDURE — 85730 THROMBOPLASTIN TIME PARTIAL: CPT

## 2021-01-01 PROCEDURE — 96365 THER/PROPH/DIAG IV INF INIT: CPT

## 2021-01-01 PROCEDURE — 85379 FIBRIN DEGRADATION QUANT: CPT

## 2021-01-01 PROCEDURE — 83880 ASSAY OF NATRIURETIC PEPTIDE: CPT

## 2021-01-01 PROCEDURE — 99223 1ST HOSP IP/OBS HIGH 75: CPT | Performed by: INTERNAL MEDICINE

## 2021-01-01 PROCEDURE — 70486 CT MAXILLOFACIAL W/O DYE: CPT

## 2021-01-01 PROCEDURE — 36600 WITHDRAWAL OF ARTERIAL BLOOD: CPT

## 2021-01-01 PROCEDURE — 86901 BLOOD TYPING SEROLOGIC RH(D): CPT

## 2021-01-01 PROCEDURE — 87205 SMEAR GRAM STAIN: CPT

## 2021-01-01 PROCEDURE — 94002 VENT MGMT INPAT INIT DAY: CPT

## 2021-01-01 PROCEDURE — 86923 COMPATIBILITY TEST ELECTRIC: CPT

## 2021-01-01 PROCEDURE — 83605 ASSAY OF LACTIC ACID: CPT

## 2021-01-01 PROCEDURE — 96375 TX/PRO/DX INJ NEW DRUG ADDON: CPT

## 2021-01-01 PROCEDURE — 87040 BLOOD CULTURE FOR BACTERIA: CPT

## 2021-01-01 PROCEDURE — 71045 X-RAY EXAM CHEST 1 VIEW: CPT

## 2021-01-01 PROCEDURE — 82803 BLOOD GASES ANY COMBINATION: CPT

## 2021-01-01 PROCEDURE — 87076 CULTURE ANAEROBE IDENT EACH: CPT

## 2021-01-01 PROCEDURE — 82550 ASSAY OF CK (CPK): CPT

## 2021-01-01 PROCEDURE — 84484 ASSAY OF TROPONIN QUANT: CPT

## 2021-01-01 RX ORDER — MORPHINE SULFATE 2 MG/ML
2 INJECTION, SOLUTION INTRAMUSCULAR; INTRAVENOUS
Status: DISCONTINUED | OUTPATIENT
Start: 2021-01-01 | End: 2021-01-01 | Stop reason: HOSPADM

## 2021-01-01 RX ORDER — SODIUM CHLORIDE 9 MG/ML
250 INJECTION, SOLUTION INTRAVENOUS AS NEEDED
Status: DISCONTINUED | OUTPATIENT
Start: 2021-01-01 | End: 2021-01-01

## 2021-01-01 RX ORDER — NOREPINEPHRINE BITARTRATE/D5W 4MG/250ML
PLASTIC BAG, INJECTION (ML) INTRAVENOUS
Status: DISCONTINUED
Start: 2021-01-01 | End: 2021-01-01

## 2021-01-01 RX ORDER — LORAZEPAM 2 MG/ML
2 INJECTION INTRAMUSCULAR
Status: DISCONTINUED | OUTPATIENT
Start: 2021-01-01 | End: 2021-01-01 | Stop reason: HOSPADM

## 2021-01-01 RX ORDER — SODIUM BICARBONATE 1 MEQ/ML
50 SYRINGE (ML) INTRAVENOUS
Status: COMPLETED | OUTPATIENT
Start: 2021-01-01 | End: 2021-01-01

## 2021-01-01 RX ORDER — NOREPINEPHRINE BITARTRATE/D5W 4MG/250ML
.5-16 PLASTIC BAG, INJECTION (ML) INTRAVENOUS
Status: DISCONTINUED | OUTPATIENT
Start: 2021-01-01 | End: 2021-01-01

## 2021-01-01 RX ORDER — SODIUM BICARBONATE 1 MEQ/ML
SYRINGE (ML) INTRAVENOUS
Status: DISCONTINUED
Start: 2021-01-01 | End: 2021-01-01

## 2021-01-01 RX ORDER — SODIUM BICARBONATE 1 MEQ/ML
50 SYRINGE (ML) INTRAVENOUS ONCE
Status: DISCONTINUED | OUTPATIENT
Start: 2021-01-01 | End: 2021-01-01

## 2021-01-01 RX ADMIN — NOREPINEPHRINE-DEXTROSE IV SOLUTION 4 MG/250ML-5% 4 MCG/MIN: 4-5/250 SOLUTION at 09:01

## 2021-01-01 RX ADMIN — SODIUM CHLORIDE 1000 ML: 900 INJECTION, SOLUTION INTRAVENOUS at 09:01

## 2021-01-01 RX ADMIN — SODIUM BICARBONATE 50 MEQ: 84 INJECTION INTRAVENOUS at 09:10

## 2021-01-01 RX ADMIN — SODIUM CHLORIDE 1000 ML: 900 INJECTION, SOLUTION INTRAVENOUS at 08:56

## 2021-01-02 NOTE — PROGRESS NOTES
Arrived to the [facility] the [facility]. Labs drawn, no replacements needed today. WBC 45, HGB 8.1, PLT 65. Labs will be checked again in 2 days. Patient tolerated well. Any issues or concerns during appointment: none. Patient aware of next infusion appointment on 1/4 at 8:30am. 
Discharged via cane to home.

## 2021-01-04 NOTE — PROGRESS NOTES
Arrived to the [facility]. Labs drawn, no replacements needed today. WBS 45, HGB 8.0, PLT 42. Labs to be rechecked in 3 days. Patient tolerated well. Any issues or concerns during appointment: none. Patient aware of next infusion appointment on 1/7 @ 0900 at . Discharged ambulatory with cane with spouse.

## 2021-01-04 NOTE — PROGRESS NOTES
Progress Note Name: Antoine Herrmann : 1945 MRN: [id number] Date of Service: 2021 Length of Service: 10 minutes Patient Diagnosis: 1. Acute myeloid leukemia not having achieved remission ([facility] [address] Reason for Visit: F/U Subjective: Seen pt shortly with her . Patient denies suicidal or homicidal ideations, intent or plans. Patient denies self-injury behaviors. Pt denies alcohol and other substance abuse. CARMEN-CP discussed Distress by using NCCN Guidelines for Patients, Distress with patient. Denies psychosocial needs at this time. Objective: 
Appearance ? Hygiene: clean and  grooming ? Dress: clean,  neat, climate appropriate Speech ? General:  clarity ? Rate: normal 
? Latency: none ? Volume: normal 
Behavior ? General: relaxed ? Eye Contact: appropriate Cooperativeness ? Cooperative and friendly. Thinking Thought Processes 
?  logical and goal directed (self-care) Thought Content ? Future oriented Mood ? Good\" Affect ? Type:  congruent ? Range: full range ? Appropriateness to content and congruence with stated mood Insight/Judgment ? Adequate Assessment/Plans: Will continue to meet with and be available to patient as as needed 3 most recent [facility] [place] Screens 2020 PHQ Not Done - - - Little interest or pleasure in doing things Not at all Not at all Not at all Feeling down, depressed, irritable, or hopeless Several days Not at all Several days Total Score PHQ 2 1 0 1 Trouble falling or staying asleep, or sleeping too much - - - Feeling tired or having little energy - - - Poor appetite, weight loss, or overeating - - - Feeling bad about yourself - or that you are a failure or have let yourself or your family down - - -  
 Trouble concentrating on things such as school, work, reading, or watching TV - - -  
Moving or speaking so slowly that other people could have noticed; or the opposite being so fidgety that others notice - - -  
Thoughts of being better off dead, or hurting yourself in some way - - -  
PHQ 9 Score - - -  
How difficult have these problems made it for you to do your work, take care of your home and get along with others - - -  
 
 
CARMEN Chavez

## 2021-01-09 PROBLEM — I61.9 STROKE, HEMORRHAGIC (HCC): Status: ACTIVE | Noted: 2021-01-01

## 2021-01-09 PROBLEM — N17.9 AKI (ACUTE KIDNEY INJURY) (HCC): Status: ACTIVE | Noted: 2021-01-01

## 2021-01-09 PROBLEM — J96.01 ACUTE RESPIRATORY FAILURE WITH HYPOXIA (HCC): Status: ACTIVE | Noted: 2021-01-01

## 2021-01-09 PROBLEM — E87.20 LACTIC ACIDOSIS: Status: ACTIVE | Noted: 2021-01-01

## 2021-01-09 PROBLEM — E87.29 METABOLIC ACIDOSIS, INCREASED ANION GAP: Status: ACTIVE | Noted: 2021-01-01

## 2021-01-09 NOTE — PROGRESS NOTES
Ventilator check complete; patient has a #6.5 ET tube secured at the 23 at the lip. Patient is sedated. Patient is not able to follow commands. Breath sounds are coarse. Trachea is midline, Negative for subcutaneous air, and chest excursion is symmetric. Patient is also Negative for cyanosis and is Positive for pitting edema. All alarms are set and audible. Resuscitation bag is at the head of the bed. Ventilator Settings Mode FIO2 Rate Tidal Volume Pressure PEEP I:E Ratio VC+  60 %    450 ml     8 cm H20  1:1.94 Peak airway pressure: 34 cm H2O Minute ventilation: 11.8 l/min ABG:  
PH 6.98 
CO2 65 
PO2 354 (on 100%) HCO3 15 Shantal Elizabeth, RT

## 2021-01-09 NOTE — PROGRESS NOTES
Patient was intubated with a number 6.5 ET Tube. Tube placement verified by auscultation, by CXR and ETCO2 monitor. ET Tube is secured at the 23 cm socrates at the lip and on the left side. Patient was intubated by PARAMEDICS on the UNKNOWN attempt. Breath sounds are coarse. Patient is Negative for subcutaneous air and chest excursion is symmetric. Trachea is midline. Patient is also Negative for cyanosis and is Positive for pitting edema. Patient placed on ventilator on documented settings. All alarms are set and audible. Resuscitation bag is at the head of the bed. Ventilator Settings Mode FIO2 Rate Tidal Volume Pressure PEEP I:E Ratio

## 2021-01-09 NOTE — ED PROVIDER NOTES
Patient with leukemia treated by Dr. Te Koenig. Came in by EMS unresponsive and intubated. There is a confusing report by EMS that patient slept on the floor all night. Is also report that she fell at some point. There is bruising to her left ear and neck. Unsure how long patient was on the ground. Family found her this morning on the ground poorly responsive. EMS showed up with patient covered in vomit on the floor. Was a brownish-red color. Has had intermittent breathing on her own and maintained a pulse in route. No CPR needed. 9:18 AM 
 reports now that he is here that patient last night had a right frontal headache severe. He got up out of bed around 930 to go to the restroom and ended up sitting on the ground next to the bed. Could not get up with his assistance and ended up sleeping on the floor with her pillow next to the bed. Had some vomiting last night. Some more vomiting this morning. With her poor respiratory effort this morning and unconsciousness EMS was called out. The history is provided by the EMS personnel. The history is limited by the condition of the patient. No  was used. Altered mental status This is a new problem. Episode onset: this AM. The problem has not changed since onset. Associated symptoms include unresponsiveness. Risk factors include a recent illness. Past Medical History:  
Diagnosis Date  AML (acute myeloid leukemia) ([facility] [address] dx- 4/2019  
 followed by dr Moody Collet  Depression  Hypercholesterolemia  Infection   
 of port -- was placed 5/2019-- removed 6/2019---right chest  
 Psychiatric disorder   
 aniexty  Sleep apnea Past Surgical History:  
Procedure Laterality Date  HX OTHER SURGICAL    
 colonoscopy  HX VASCULAR ACCESS    
 IR INSERT TUNL CVC W PORT OVER 5 YEARS  4/30/2019  IR INSERT TUNL CVC W PORT OVER 5 YEARS  7/15/2019  IR REMOVE TUNL CVAD W PORT/PUMP  6/13/2019 Family History:  
Problem Relation Age of Onset  Cancer Father Social History Socioeconomic History  Marital status:  Spouse name: Not on file  Number of children: Not on file  Years of education: Not on file  Highest education level: Not on file Occupational History  Not on file Social Needs  Financial resource strain: Not on file  Food insecurity Worry: Not on file Inability: Not on file  Transportation needs Medical: Not on file Non-medical: Not on file Tobacco Use  Smoking status: Never Smoker  Smokeless tobacco: Never Used Substance and Sexual Activity  Alcohol use: Never Frequency: Never  Drug use: Never  Sexual activity: Not on file Lifestyle  Physical activity Days per week: Not on file Minutes per session: Not on file  Stress: Not on file Relationships  Social connections Talks on phone: Not on file Gets together: Not on file Attends [employer] service: Not on file Active member of club or organization: Not on file Attends meetings of clubs or organizations: Not on file Relationship status: Not on file  Intimate partner violence Fear of current or ex partner: Not on file Emotionally abused: Not on file Physically abused: Not on file Forced sexual activity: Not on file Other Topics Concern [address] Service Not Asked  Blood Transfusions Not Asked  Caffeine Concern Not Asked  Occupational Exposure Not Asked Areatha Seals Hazards Not Asked  Sleep Concern Not Asked  Stress Concern Not Asked  Weight Concern Not Asked  Special Diet Not Asked  Back Care Not Asked  Exercise Not Asked  Bike Helmet Not Asked  Seat Belt Not Asked  Self-Exams Not Asked Social History Narrative  Not on file ALLERGIES: Levaquin [levofloxacin] Review of Systems Unable to perform ROS: Mental status change Gastrointestinal: Positive for vomiting. There were no vitals filed for this visit. Physical Exam 
Constitutional:   
   General: She is in acute distress. Appearance: She is well-developed. She is obese. HENT:  
   Head: Normocephalic. Comments: Ecchymosis to left ear and neck. Eyes:  
   Comments: Pupils equal and fixed. Neck:  
   Comments: Left ecchymosis Cardiovascular:  
   Rate and Rhythm: Normal rate and regular rhythm. Pulmonary:  
   Comments: Intubated with poor respiratory effort. Abdominal:  
   Palpations: Abdomen is soft. There is no mass. Musculoskeletal:     
   General: Swelling present. No deformity. Skin: 
   General: Skin is dry. Coloration: Skin is pale. Comments: Cold to touch Neurological:  
   Comments: Unresponsive. MDM Number of Diagnoses or Management Options Acute respiratory failure, unspecified whether with hypoxia or hypercapnia ([address] Hypothermia, initial encounter Intracranial hemorrhage ([address] Diagnosis management comments: Patient with hemorrhagic brain bleed. Headache since last night. Unresponsive this morning with vomiting. Intubated by EMS. Discussed with neurosurgery who request admission to the ICU. They will come and see patient. Discussed with the intensivist who will admit the patient. Patient is hypothermic on a bear hugger. The intensivist came down and talked with patient at length. It has been decided they will withdraw care. We will keep patient here in the ER and monitor. After ET tube was removed patient has passed away. Time of death is 12:14 PM. Amount and/or Complexity of Data Reviewed Clinical lab tests: ordered and reviewed Tests in the radiology section of CPT®: ordered and reviewed Tests in the medicine section of CPT®: reviewed and ordered Patient Progress Patient progress: stable Procedures CT HEAD WO CONT (Final result) Result time 01/09/21 10:01:36 Final result by Ember Cintron DO (01/09/21 10:01:36) Impression:  
 IMPRESSION:  
1.. Large intraparenchymal hemorrhage within the right occipital parietal  
region. The findings could be secondary to a hemorrhagic infarct, amyloid  
angiopathy versus underlying mass. There is extensive intraventricular extension  
and right to left subfalcine herniation. 2. Possible smaller focus of acute hemorrhage within the right occipital lobe. 2. Cervical spondylosis. Machelle Gonzalez The critical results contained in this report were communicated to Dr. Erik Funk by  
myself on 01/09/2021 at 10:00 AM. Critical results were communicated as outlined  
in Section II.C.2.a.i of the ACR Practice Guideline for Communication of  
Diagnostic Imaging Findings. Narrative: CT head and cervical spine without contrast  
 
HISTORY:Patient found on floor unconscious. History of leukemia. TECHNIQUE: 5 mm axial images were obtained from the skull base to the vertex  
without intravenous contrast. Helically acquired images were obtained spine  
reconstructed at 2.5 mm thickness. Sagittal and coronal reformatted images were  
submitted. All CT scans at this facility are performed using dose optimization technique as  
appropriate to a performed exam, to include on automated exposure control,  
adjustment of the mA and/or KV according to patient's size (including  
appropriate matching for site-specific examinations), or use of iterative  
reconstruction technique. COMPARISON: CT head 11/25/2020. CT head without contrast:  
 
Findings: There is acute intraparenchymal hemorrhage within the right occipital  
parietal region measuring approximately 5.1 x 3.5 x 5.7 cm in size. There are  
smaller cyst nearby satellite hemorrhages. There is a large amount of  
intraventricular hemorrhage. There is approximately 11 mm of right-to-left subfalcine herniation. There is no definite hydrocephalus. There is diffuse  
cerebral edema with absence of sulci. There is a 6 mm focus of increased density  
within the right cerebellar hemisphere which may represent an additional focus  
of hemorrhage. The bony calvarium is intact. There appears to be left sided  
scalp soft tissue swelling. There is a small amount of layering fluid within the  
sphenoid and right maxillary sinuses, nonspecific in the setting of intubation. CT cervical spine without contrast:  
 
Findings: The vertebral body height and alignment are well maintained. There is  
moderate disc space narrowing at C5-6. There is multilevel facet arthropathy,  
moderate in severity on the right at C3-4 and on the left at C4-5. There is no  
evidence of acute fracture or subluxation. The prevertebral soft tissues are  
unremarkable.   
  
  
  
   
   
CT MAXILLOFACIAL WO CONT (Final result) Result time 01/09/21 10:03:52 Final result by Rosa Elena Zimmerman DO (01/09/21 10:03:52) Impression:  
 IMPRESSION: No evidence of acute facial bone fracture. Narrative:  
 CT facial bones without contrast  
 
HISTORY: Fall. TECHNIQUE: Helically acquired images were obtained through the facial bones  
reconstructed at 2.5 cm thickness. Sagittal and coronal reformatted images were  
submitted. Radiation dose reduction techniques were used for this study:  Our CT scanners  
use one or all of the following: Automated exposure control, adjustment of the  
mA and/or kVp according to patient's size, iterative reconstruction. Amarjit Neri COMPARISON: None. FINDINGS: There is mild mucosal thickening within the right maxillary sinus. There is a small amount of layering fluid within the bilateral sphenoid and  
right maxillary sinuses.  There are scattered mild mucosal thickening within  
bilateral ethmoid air cells these findings are nonspecific in the setting of  
 intubation. There is no evidence of acute facial bone fracture. These are  
rheumatic arches and pterygoid plates are intact. The mastoid air cells are  
clear. There are degenerative changes of the temporomandibular joints, left  
greater than right. There is incompletely visualized hemorrhage within the right  
occipital parietal region with intraventricular extension. These findings were  
reported on a CT scan of the brain performed on the same day.   
  
  
  
   
   
CT SPINE CERV WO CONT (Final result) Result time 01/09/21 10:01:36 Final result by Sarmad Telles DO (01/09/21 10:01:36) Impression:  
 IMPRESSION:  
1.. Large intraparenchymal hemorrhage within the right occipital parietal  
region. The findings could be secondary to a hemorrhagic infarct, amyloid  
angiopathy versus underlying mass. There is extensive intraventricular extension  
and right to left subfalcine herniation. 2. Possible smaller focus of acute hemorrhage within the right occipital lobe. 2. Cervical spondylosis. Mike Reli The critical results contained in this report were communicated to Dr. Piedad Christy by  
myself on 01/09/2021 at 10:00 AM. Critical results were communicated as outlined  
in Section II.C.2.a.i of the ACR Practice Guideline for Communication of  
Diagnostic Imaging Findings. Narrative: CT head and cervical spine without contrast  
 
HISTORY:Patient found on floor unconscious. History of leukemia. TECHNIQUE: 5 mm axial images were obtained from the skull base to the vertex  
without intravenous contrast. Helically acquired images were obtained spine  
reconstructed at 2.5 mm thickness. Sagittal and coronal reformatted images were  
submitted. All CT scans at this facility are performed using dose optimization technique as  
appropriate to a performed exam, to include on automated exposure control,  
adjustment of the mA and/or KV according to patient's size (including appropriate matching for site-specific examinations), or use of iterative  
reconstruction technique. COMPARISON: CT head 11/25/2020. CT head without contrast:  
 
Findings: There is acute intraparenchymal hemorrhage within the right occipital  
parietal region measuring approximately 5.1 x 3.5 x 5.7 cm in size. There are  
smaller cyst nearby satellite hemorrhages. There is a large amount of  
intraventricular hemorrhage. There is approximately 11 mm of right-to-left  
subfalcine herniation. There is no definite hydrocephalus. There is diffuse  
cerebral edema with absence of sulci. There is a 6 mm focus of increased density  
within the right cerebellar hemisphere which may represent an additional focus  
of hemorrhage. The bony calvarium is intact. There appears to be left sided  
scalp soft tissue swelling. There is a small amount of layering fluid within the  
sphenoid and right maxillary sinuses, nonspecific in the setting of intubation. CT cervical spine without contrast:  
 
Findings: The vertebral body height and alignment are well maintained. There is  
moderate disc space narrowing at C5-6. There is multilevel facet arthropathy,  
moderate in severity on the right at C3-4 and on the left at C4-5. There is no  
evidence of acute fracture or subluxation. The prevertebral soft tissues are  
unremarkable.   
  
  
  
   
   
XR CHEST PORT (Final result) Result time 01/09/21 09:29:01 Final result by Munira Mcintyre DO (01/09/21 09:29:01) Impression:  
 Impression: 1. Endotracheal tube in satisfactory position. 2. Grossly clear lungs. Narrative:  
 Portable chest:  
 
History: tube placement. Respiratory failure Comparison: 12/22/2020 Findings: A single view of the chest was obtained at 9:17 AM. Endotracheal tube  
tip is approximately 3.9 cm above the [place]. Left internal jugular Mediport  
catheter is unchanged. The cardiac and mediastinal silhouette are normal in size and configuration. The  
lungs and pleural spaces are grossly clear. The pulmonary vascularity is within  
normal limits.   
  
  
  
   
 
 
 
 
Results Include: 
 
Recent Results (from the past 24 hour(s)) METABOLIC PANEL, COMPREHENSIVE Collection Time: 01/09/21  8:57 AM  
Result Value Ref Range Sodium 144 136 - 145 mmol/L Potassium 4.1 3.5 - 5.1 mmol/L Chloride 110 (H) 98 - 107 mmol/L  
 CO2 16 (L) 21 - 32 mmol/L Anion gap 18 (H) 7 - 16 mmol/L Glucose 282 (H) 65 - 100 mg/dL BUN 25 (H) 8 - 23 MG/DL Creatinine 1.93 (H) 0.6 - 1.0 MG/DL  
 GFR est AA 33 (L) >60 ml/min/1.73m2 GFR est non-AA 27 (L) >60 ml/min/1.73m2 Calcium 8.4 8.3 - 10.4 MG/DL Bilirubin, total 0.2 0.2 - 1.1 MG/DL  
 ALT (SGPT) 19 12 - 65 U/L  
 AST (SGOT) 28 15 - 37 U/L Alk. phosphatase 146 (H) 50 - 136 U/L Protein, total 5.5 (L) 6.3 - 8.2 g/dL Albumin 3.0 (L) 3.2 - 4.6 g/dL Globulin 2.5 2.3 - 3.5 g/dL A-G Ratio 1.2 1.2 - 3.5 LACTIC ACID Collection Time: 01/09/21  8:57 AM  
Result Value Ref Range Lactic acid 12.7 (HH) 0.4 - 2.0 MMOL/L  
TROPONIN-HIGH SENSITIVITY Collection Time: 01/09/21  8:57 AM  
Result Value Ref Range Troponin-High Sensitivity 25.3 (H) 0 - 14 pg/mL CK Collection Time: 01/09/21  8:57 AM  
Result Value Ref Range  21 - 215 U/L  
LIPASE Collection Time: 01/09/21  8:57 AM  
Result Value Ref Range Lipase 90 73 - 393 U/L  
TSH 3RD GENERATION Collection Time: 01/09/21  8:57 AM  
Result Value Ref Range TSH 1.290 0.358 - 3.740 uIU/mL PROTHROMBIN TIME + INR Collection Time: 01/09/21  8:57 AM  
Result Value Ref Range Prothrombin time 22.1 (H) 12.5 - 14.7 sec INR 1.9 PTT Collection Time: 01/09/21  8:57 AM  
Result Value Ref Range aPTT 38.3 (H) 24.1 - 35.1 SEC NT-PRO BNP Collection Time: 01/09/21  8:57 AM  
Result Value Ref Range  NT pro-BNP 1,453 (H) <450 PG/ML  
 EKG, 12 LEAD, INITIAL Collection Time: 01/09/21  8:59 AM  
Result Value Ref Range Ventricular Rate 83 BPM  
 Atrial Rate 83 BPM  
 P-R Interval 196 ms QRS Duration 98 ms Q-T Interval 394 ms QTC Calculation (Bezet) 462 ms Calculated P Axis 77 degrees Calculated R Axis -44 degrees Calculated T Axis 45 degrees Diagnosis    
  !! AGE AND GENDER SPECIFIC ECG ANALYSIS !! Normal sinus rhythm Left axis deviation Minimal voltage criteria for LVH, may be normal variant Junctional ST depression, probably normal 
Abnormal ECG GLUCOSE, POC Collection Time: 01/09/21  9:04 AM  
Result Value Ref Range Glucose (POC) 357 (H) 65 - 100 mg/dL SARS-COV-2 Collection Time: 01/09/21  9:05 AM  
Result Value Ref Range Specimen source Nasopharyngeal    
 COVID-19 rapid test PENDING   
 SARS CoV-2 PENDING   
POC G3 Collection Time: 01/09/21  9:07 AM  
Result Value Ref Range Device: VENT    
 FIO2 (POC) 100 % pH (POC) 6.98 (LL) 7.35 - 7.45    
 pCO2 (POC) 65.8 (HH) 35 - 45 MMHG  
 pO2 (POC) 354 (H) 75 - 100 MMHG  
 HCO3 (POC) 15.3 (L) 22 - 26 MMOL/L  
 sO2 (POC) 100 (H) 95 - 98 % Base deficit (POC) 15 mmol/L Mode Pressure regulated volume control Tidal volume 450 ml Set Rate 20 bpm  
 PEEP/CPAP (POC) 8 cmH2O Allens test (POC) YES Inspiratory Time 1.00 sec Site RIGHT RADIAL Specimen type (POC) ARTERIAL Performed by [place]'s CO2, POC 17 MMOL/L Respiratory comment: PhysicianNotified Exhaled minute volume 10.00 L/min 1000 [facility] EKG: normal sinus rhythm, nonspecific ST and T waves changes. Rate 83.

## 2021-01-09 NOTE — CONSULTS
PULMONARY/CCM CONSULT :  1/9/2021 Date of Admission:  1/9/2021 The patient's chart has been reviewed and the chart has been discussed with nursing staff. Subjective: This patient has been seen and evaluated at the request of Dr. Nikolay Wei.  
 
Patient is a 76 y.o.  female presents after being intubated in the field per EMS after being minimally responsive at home. Information is obtained from the spouse. Spouse reports last PM pt was c/o severe R sided headache with nausea and vomiting. Spouse had gotten up to go to the restroom and ended up sitting at the side of the bed, he was unable to get up, so he slept on the floor next to her overnight. When he woke up this AM he noticed her poor respiratory effort and inability to awaken her, EMS was called out. CT scan completed in ED showed  right occipital parietal region measuring approximately 5.1 x 3.5 x 5.7 cm in size. There are smaller cyst nearby satellite hemorrhages. There is a large amount of intraventricular hemorrhage. There is approximately 11 mm of right-to-left subfalcine herniation. There is no definite hydrocephalus. There is diffuse cerebral edema with absence of sulci. There is a 6 mm focus of increased density within the right cerebellar hemisphere which may represent an additional focus of hemorrhage. She has a PMHx of recent recurrence of leukemia (followed by Dr Scarlette Pallas suppose to start treatment Monday), anxiety, sleep apnea, and depression. Neurosurgery consult was pending upon my arrival to the ED. We were consulted for admission to the ICU. Past Surgical History:  
Procedure Laterality Date  HX OTHER SURGICAL    
 colonoscopy  HX VASCULAR ACCESS    
 IR INSERT TUNL CVC W PORT OVER 5 YEARS  4/30/2019  IR INSERT TUNL CVC W PORT OVER 5 YEARS  7/15/2019  IR REMOVE TUNL CVAD W PORT/PUMP  6/13/2019 Social History Tobacco Use  Smoking status: Never Smoker  Smokeless tobacco: Never Used Substance Use Topics  Alcohol use: Never Frequency: Never Family History Problem Relation Age of Onset  Cancer Father Allergies Allergen Reactions  Levaquin [Levofloxacin] Other (comments)  
  tendonitis Prior to Admission Medications Prescriptions Last Dose Informant Patient Reported? Taking? DULoxetine (Cymbalta) 30 mg capsule   No No  
Sig: Take 1 Cap by mouth daily. acetaminophen 500 mg tab 500 mg, diphenhydrAMINE 25 mg cap 25 mg   Yes No  
Sig: Take  by mouth nightly. acyclovir (ZOVIRAX) 400 mg tablet   No No  
Sig: Take 1 Tab by mouth two (2) times a day. alprazolam (XANAX PO)   Yes No  
Sig: Take 1 Tab by mouth every eight (8) hours as needed. azithromycin (ZITHROMAX) 500 mg tab   No No  
Sig: Take 1 Tab by mouth daily for 30 days. cholecalciferol (VITAMIN D3) 400 unit tab tablet   No No  
Sig: Take 2 Tabs by mouth daily. gabapentin (NEURONTIN) 100 mg capsule   No No  
Sig: Take 2 Caps by mouth nightly. glasdegib 100 mg tab   No No  
Sig: Take 100 mg by mouth daily. Indications: acute myeloid leukemia, a type of blood cancer  
lidocaine-prilocaine (EMLA) topical cream   No No  
Sig: Apply  to affected area as needed for Pain.  
magnesium oxide (MAG-OX) 400 mg tablet   No No  
Sig: Take 1 Tab by mouth daily. omeprazole (PRILOSEC) 20 mg capsule   No No  
Sig: Take 1 Cap by mouth daily. ondansetron hcl (ZOFRAN) 8 mg tablet   No No  
Sig: Take 1 Tab by mouth every eight (8) hours as needed for Nausea. potassium chloride (K-DUR, KLOR-CON) 20 mEq tablet   No No  
Sig: TAKE 1 TABLET BY MOUTH DAILY TO PREVENT LOW POTASSIUM IN THE BLOOD  
pravastatin (PRAVACHOL) 10 mg tablet   Yes No  
Sig: Take 20 mg by mouth nightly. triamcinolone acetonide (KENALOG) 0.1 % topical cream   No No  
Sig: Apply  to affected area two (2) times a day. use thin layer  
vit C/E/zinc/lutein/zeaxanthin (OCUVITE EYE HEALTH PO)   Yes No  
Sig: Take 1 Tab by mouth daily. voriconazole (VFEND) 200 mg tablet   No No  
Sig: TAKE 1 TABLET BY MOUTH EVERY 12 HOURS  
zolpidem (AMBIEN) 10 mg tablet   No No  
Sig: Take 1 Tab by mouth nightly as needed for Sleep. Max Daily Amount: 10 mg. Facility-Administered Medications: None MEDS SCHEDULED:   
Current Facility-Administered Medications Medication Dose Route Frequency  NOREPINephrine (LEVOPHED) 4 mg in 5% dextrose 250 mL infusion  0.5-16 mcg/min IntraVENous TITRATE  sodium bicarbonate 8.4 % (1 mEq/mL) injection  0.9% sodium chloride infusion 250 mL  250 mL IntraVENous PRN  
 0.9% sodium chloride infusion 250 mL  250 mL IntraVENous PRN  
 sodium bicarbonate 8.4 % (1 mEq/mL) injection 50 mEq  50 mEq IntraVENous ONCE  
 sodium bicarbonate (8.4%) 150 mEq in dextrose 5% 1,000 mL infusion   IntraVENous CONTINUOUS  
 0.9% sodium chloride infusion 250 mL  250 mL IntraVENous PRN Current Outpatient Medications Medication Sig  potassium chloride (K-DUR, KLOR-CON) 20 mEq tablet TAKE 1 TABLET BY MOUTH DAILY TO PREVENT LOW POTASSIUM IN THE BLOOD  glasdegib 100 mg tab Take 100 mg by mouth daily. Indications: acute myeloid leukemia, a type of blood cancer  azithromycin (ZITHROMAX) 500 mg tab Take 1 Tab by mouth daily for 30 days.  magnesium oxide (MAG-OX) 400 mg tablet Take 1 Tab by mouth daily.  alprazolam (XANAX PO) Take 1 Tab by mouth every eight (8) hours as needed.  omeprazole (PRILOSEC) 20 mg capsule Take 1 Cap by mouth daily.  voriconazole (VFEND) 200 mg tablet TAKE 1 TABLET BY MOUTH EVERY 12 HOURS  DULoxetine (Cymbalta) 30 mg capsule Take 1 Cap by mouth daily.  acyclovir (ZOVIRAX) 400 mg tablet Take 1 Tab by mouth two (2) times a day.  triamcinolone acetonide (KENALOG) 0.1 % topical cream Apply  to affected area two (2) times a day. use thin layer  zolpidem (AMBIEN) 10 mg tablet Take 1 Tab by mouth nightly as needed for Sleep. Max Daily Amount: 10 mg.  gabapentin (NEURONTIN) 100 mg capsule Take 2 Caps by mouth nightly.  lidocaine-prilocaine (EMLA) topical cream Apply  to affected area as needed for Pain.  cholecalciferol (VITAMIN D3) 400 unit tab tablet Take 2 Tabs by mouth daily.  ondansetron hcl (ZOFRAN) 8 mg tablet Take 1 Tab by mouth every eight (8) hours as needed for Nausea.  vit C/E/zinc/lutein/zeaxanthin (OCUVITE EYE HEALTH PO) Take 1 Tab by mouth daily.  acetaminophen 500 mg tab 500 mg, diphenhydrAMINE 25 mg cap 25 mg Take  by mouth nightly.  pravastatin (PRAVACHOL) 10 mg tablet Take 20 mg by mouth nightly. Facility-Administered Medications Ordered in Other Encounters Medication Dose Route Frequency  central line flush (saline) syringe 10 mL  10 mL InterCATHeter PRN Review of Systems Review of systems not obtained due to patient factors. Objective:  
 
Vitals:  
 01/09/21 0852 01/09/21 0900 01/09/21 4470 BP:  (!) 66/41 135/62 Pulse: 91 84 83 Resp: 25 16 12 Temp:  (!) (P) 89 °F (31.7 °C) SpO2: 97% Weight:  203 lb (92.1 kg) Height:  5' 5\" (1.651 m) No intake/output data recorded. No intake/output data recorded. PHYSICAL EXAM  
 
Physical Exam 
Constitutional:   
   Appearance: She is ill-appearing. HENT:  
   Head:  
   Comments: Facial swelling and bruising noted Mouth/Throat:  
   Comments: Bloody drainage noted, ecchymosis to lips Eyes:  
   Comments: Non reactive 6 mm R, 5 mm L Cardiovascular:  
   Rate and Rhythm: Normal rate and regular rhythm. Heart sounds: Normal heart sounds. Comments: NSR on monitor Pulmonary:  
   Breath sounds: Normal breath sounds. Comments: No effort noted, not breathing over vent. Intubated. Not sedated Abdominal:  
   Palpations: Abdomen is soft. Comments: Obese Hypoactive bowel sounds Musculoskeletal:     
   General: Swelling (facial swelling, BUE 1+) present. Right lower leg: Edema (trace) present. Left lower leg: Edema (trace) present. Skin: 
   Capillary Refill: Capillary refill takes more than 3 seconds. Coloration: Skin is pale. Findings: Bruising present. Comments: Cool, hypothermic Neurological:  
   Cranial Nerves: Cranial nerve deficit present. Sensory: Sensory deficit present. Deep Tendon Reflexes: Reflexes abnormal.  
   Comments: Unresponsive Psychiatric:  
   Comments: Unable to assess CHEST X-RAYS: 
01/09/2021 CT: head w/o contrast:  
 
01/09/2021 There is acute intraparenchymal hemorrhage within the right occipital 
parietal region measuring approximately 5.1 x 3.5 x 5.7 cm in size. There are 
smaller cyst nearby satellite hemorrhages. There is a large amount of 
intraventricular hemorrhage. There is approximately 11 mm of right-to-left 
subfalcine herniation. There is no definite hydrocephalus. There is diffuse 
cerebral edema with absence of sulci. There is a 6 mm focus of increased density 
within the right cerebellar hemisphere which may represent an additional focus 
of hemorrhage. The bony calvarium is intact. There appears to be left sided 
scalp soft tissue swelling. There is a small amount of layering fluid within the 
sphenoid and right maxillary sinuses, nonspecific in the setting of intubation. 
  
IMPRESSION: 
1.. Large intraparenchymal hemorrhage within the right occipital parietal 
region. The findings could be secondary to a hemorrhagic infarct, amyloid 
angiopathy versus underlying mass. There is extensive intraventricular extension 
and right to left subfalcine herniation. 2. Possible smaller focus of acute hemorrhage within the right occipital lobe. 3. Cervical spondylosis. CT Spine Cervical:  
01/09/2021 IMPRESSION: 
1.. Large intraparenchymal hemorrhage within the right occipital parietal 
region. The findings could be secondary to a hemorrhagic infarct, amyloid angiopathy versus underlying mass. There is extensive intraventricular extension 
and right to left subfalcine herniation. 2. Possible smaller focus of acute hemorrhage within the right occipital lobe. 3. Cervical spondylosis. ECHO:  
11/26/2019 SUMMARY: 
  
-  Left ventricle: Systolic function was normal. Ejection fraction was 
estimated in the range of 55 % to 60 %.   
-  Aortic valve: Focal thickening of the non-coronary cusp unchanged from  
study 11/6/19. 
  
-  Mitral valve: There was mild regurgitation. There was no evidence for 
vegetation. 
  
-  Pulmonic valve: There was no evidence for vegetation. 
  
-  Pericardium: Trace pericardial effusion no evidence of tamponade. 
  
-  Additional impressions: No definitive evidence of valvular vegetation. CULTURES: 
Results Procedure Component Value Units Date/Time CULTURE, BLOOD [[id number]] Collected: 01/09/21 0912 Order Status: Completed Specimen: Blood Updated: 01/09/21 7369 CULTURE, BLOOD [[id number]] Collected: 01/09/21 0905 Order Status: Completed Specimen: Blood Updated: 01/09/21 1015 LABS Recent Labs 01/09/21 
8875 .9* HGB 6.2* HCT 20.9* PLT 8* Recent Labs 01/09/21 
0984   
K 4.1 * * CO2 16*  
BUN 25* CREA 1.93* INR 1.9 No results for input(s): PH, PCO2, PO2, HCO3 in the last 72 hours. Assessment:  
 
Hospital Problems  Date Reviewed: 11/25/2020 Codes Class Noted POA Lactic acidosis ICD-10-CM: E87.2 ICD-9-CM: 276.2  1/9/2021 Unknown * (Principal) Stroke, hemorrhagic ([facility] ICD-10-CM: I61.9 ICD-9-CM: 575  1/9/2021 Unknown Acute respiratory failure with hypoxia [facility]) ICD-10-CM: J96.01 
ICD-9-CM: 518.81  1/9/2021 Unknown CONNIE (acute kidney injury) ([facility] ICD-10-CM: N17.9 ICD-9-CM: 584.9  1/9/2021 Unknown Acute myeloid leukemia not having achieved remission ([facility] ICD-10-CM: C92.00 ICD-9-CM: 205.00  5/9/2019 Yes AML (acute myeloblastic leukemia) ([facility] ICD-10-CM: C92.00 ICD-9-CM: 205.00  4/28/2019 Yes Pancytopenia ([facility] ICD-10-CM: D63.206 ICD-9-CM: 284.19  4/28/2019 Yes Thrombocytopenia ([facility] ICD-10-CM: D69.6 ICD-9-CM: 287.5  4/27/2019 Yes Very critical ill patient with recent reoccurrence of leukemia pending treatment initiation. Discussed with spouse and reviewed labs and scans. Spouse voiced he does not want pt to be resuscitated at this time, so will plan to make her a DNR. Plan:  
--initially had planned to admit to ICU, but after discussion with neurosurgeon spouse decided to withdrawal care. Pt is not a surgical candidate and outcome is very grim. --pt comfort care and will extubate upon arrival of  for spouse support. --DNR, comfort care 
--Dr Diana Swain primary --thank you for this consult. Pulmonary will sign off. Rosalie Elmore,  ARMANDO-C Lungs: decreased BS in the bases Heart:  RRR with no Murmur/Rubs/Gallops Additional Comments:  Very unfortunate patient with massive hemorrhagic stroke in setting of AML and I agree with comfort care given the prognosis is dismal at this point. Discussed with  at bed side. Will sign off I have spoken with and examined the patient. I agree with the above assessment and plan as documented. Noelle Barlow MD 
 
 
 
More than 50% of time documented was spent in face-to-face contact with the patient and in the care of the patient on the floor/unit where the patient is located.

## 2021-01-09 NOTE — CONSULTS
Neurology Surgery Consult Subjective: 
 
 
 
  
  
Mariann Del Toro is a 76 y.o. last seen at baseline last PM by , w/ c/o HA, nausea, did not wake up this am, brought to ED, CT Head shows large ICH with IVH, patient w/o brainstem reflexes, fixed and dilated 4-5 mm. Patient has reported relapse of leukemia.  provided all history given patients condition. Patient Active Problem List  
 Diagnosis Date Noted  Lactic acidosis 01/09/2021  Stroke, hemorrhagic ([address] 01/09/2021  Acute respiratory failure with hypoxia ([address] 01/09/2021  CONNIE (acute kidney injury) ([address] 01/09/2021  Neutropenic fever ([address] 12/21/2020  Febrile neutropenia ([address] 09/21/2019  Pancytopenia due to antineoplastic chemotherapy ([address] 06/12/2019  Cellulitis of neck 06/12/2019  Immunocompromised status associated with infection ([address] 06/12/2019  Port or reservoir infection 06/12/2019  Acute myeloid leukemia not having achieved remission ([address] 05/09/2019  Admission for antineoplastic chemotherapy 05/05/2019  AML (acute myeloblastic leukemia) ([address] 04/28/2019  Weakness generalized 04/28/2019  Pancytopenia ([address] 04/28/2019  Thrombocytopenia ([address] 04/27/2019 Maria A Gonsales MD 
 
  
Past Medical History:  
Diagnosis Date  AML (acute myeloid leukemia) ([address] dx- 4/2019  
 followed by dr David Erwin  Depression  Hypercholesterolemia  Infection   
 of port -- was placed 5/2019-- removed 6/2019---right chest  
 Psychiatric disorder   
 aniexty  Sleep apnea Past Surgical History:  
Procedure Laterality Date  HX OTHER SURGICAL    
 colonoscopy  HX VASCULAR ACCESS    
 IR INSERT TUNL CVC W PORT OVER 5 YEARS  4/30/2019  IR INSERT TUNL CVC W PORT OVER 5 YEARS  7/15/2019  IR REMOVE TUNL CVAD W PORT/PUMP  6/13/2019 Allergies Allergen Reactions  Levaquin [Levofloxacin] Other (comments)  
  tendonitis Family History Problem Relation Age of Onset  Cancer Father Current Facility-Administered Medications Medication Dose Route Frequency  morphine injection 2 mg  2 mg IntraVENous Q1H PRN  
 LORazepam (ATIVAN) injection 2 mg  2 mg IntraVENous Q2H PRN Current Outpatient Medications Medication Sig  potassium chloride (K-DUR, KLOR-CON) 20 mEq tablet TAKE 1 TABLET BY MOUTH DAILY TO PREVENT LOW POTASSIUM IN THE BLOOD  glasdegib 100 mg tab Take 100 mg by mouth daily. Indications: acute myeloid leukemia, a type of blood cancer  azithromycin (ZITHROMAX) 500 mg tab Take 1 Tab by mouth daily for 30 days.  magnesium oxide (MAG-OX) 400 mg tablet Take 1 Tab by mouth daily.  alprazolam (XANAX PO) Take 1 Tab by mouth every eight (8) hours as needed.  omeprazole (PRILOSEC) 20 mg capsule Take 1 Cap by mouth daily.  voriconazole (VFEND) 200 mg tablet TAKE 1 TABLET BY MOUTH EVERY 12 HOURS  DULoxetine (Cymbalta) 30 mg capsule Take 1 Cap by mouth daily.  acyclovir (ZOVIRAX) 400 mg tablet Take 1 Tab by mouth two (2) times a day.  triamcinolone acetonide (KENALOG) 0.1 % topical cream Apply  to affected area two (2) times a day. use thin layer  zolpidem (AMBIEN) 10 mg tablet Take 1 Tab by mouth nightly as needed for Sleep. Max Daily Amount: 10 mg.  
 gabapentin (NEURONTIN) 100 mg capsule Take 2 Caps by mouth nightly.  lidocaine-prilocaine (EMLA) topical cream Apply  to affected area as needed for Pain.  cholecalciferol (VITAMIN D3) 400 unit tab tablet Take 2 Tabs by mouth daily.  ondansetron hcl (ZOFRAN) 8 mg tablet Take 1 Tab by mouth every eight (8) hours as needed for Nausea.  vit C/E/zinc/lutein/zeaxanthin (OCUVITE EYE HEALTH PO) Take 1 Tab by mouth daily.  acetaminophen 500 mg tab 500 mg, diphenhydrAMINE 25 mg cap 25 mg Take  by mouth nightly.  pravastatin (PRAVACHOL) 10 mg tablet Take 20 mg by mouth nightly. Facility-Administered Medications Ordered in Other Encounters Medication Dose Route Frequency  central line flush (saline) syringe 10 mL  10 mL InterCATHeter PRN Review of Systems: Review of systems not obtained due to patient factors. Objective:  
 
Patient Vitals for the past 8 hrs: 
 BP Temp Pulse Resp SpO2 Height Weight 01/09/21 0922 135/62  83 12     
01/09/21 0900 (!) 66/41 (!) (P) 89 °F (31.7 °C) 84 16  5' 5\" (1.651 m) 92.1 kg (203 lb) 01/09/21 0852   91 25 97 %   Physical Exam:   
General:  Nonresponsive Eyes:  NML Ears:  NML Nose: NML Mouth/Throat:   
Neck: Bruising to posterior left side, otherwise edema in general anterior cervical region, trachea midline Back:   No major malignment Lungs:   deferred Heart:  Regular Abdomen:   soft Extremities: Extremities normal, atraumatic, no cyanosis or edema. Pulses: 2+ and symmetric all extremities. Skin: Contusion left posterior cervical region/scalp Lymph nodes:  NML Neurologic: GCS 3, pupils fixed 4-5 mm/no cough/gag/corneals. CT HEAD IMPRESSION: 
1.. Large intraparenchymal hemorrhage within the right occipital parietal 
region. The findings could be secondary to a hemorrhagic infarct, amyloid 
angiopathy versus underlying mass. There is extensive intraventricular extension 
and right to left subfalcine herniation. 2. Possible smaller focus of acute hemorrhage within the right occipital lobe. 2. Cervical spondylosis. Assessment:  
 
77 y/o WF hx of Leukemia, acute AMS due to hemorrhagic CVA right parietal occipital region with IVH, cerebral herniation. 
 
- Patient unfortunately clinically brain dead. Do not see any opportunity to reverse her current status. Cranial surgery futile, EVD unlikely to change clinical course given her current status. Plan: Discussed status with  who agrees wife/patient would not want full code status given her situation. Recc. Palliative measures only. CT C spine shows only degenerative changes so we removed c collar. Please call if we can be of any further assistance. Barry García DO, MS, FACOS Neurosurgery (on behalf of Drs Marvia Goltz and Chris Ordoñez) [phone]

## 2021-01-09 NOTE — ED TRIAGE NOTES
Pt arrives via EMS from home. Found on floor unconscious/unresponsive with bloody emesis around her about 45 minutes. Unknown down time. Intubated by EMS PTA. Blood noted in mouth and ears. Hx of leukemia. Bruising noted to neck and head. Dr. Ahsan Arrington at bedside. EMS then reports pt was sleeping on floor last night. EMS does not remember initial pulse ox, states BGL he thinks was 364. Remained with pulse entire time.

## 2021-01-10 LAB
ACC. NO. FROM MICRO ORDER, ACCP: ABNORMAL
ATRIAL RATE: 83 BPM
CALCULATED P AXIS, ECG09: 77 DEGREES
CALCULATED R AXIS, ECG10: -44 DEGREES
CALCULATED T AXIS, ECG11: 45 DEGREES
DIAGNOSIS, 93000: NORMAL
INTERPRETATION: ABNORMAL
P-R INTERVAL, ECG05: 196 MS
Q-T INTERVAL, ECG07: 394 MS
QRS DURATION, ECG06: 98 MS
QTC CALCULATION (BEZET), ECG08: 462 MS
STREPTOCOCCUS , STPSP: DETECTED
VENTRICULAR RATE, ECG03: 83 BPM

## 2021-01-11 ENCOUNTER — HOSPITAL ENCOUNTER (OUTPATIENT)
Dept: INFUSION THERAPY | Age: 76
End: 2021-01-11

## 2021-01-11 LAB
COVID-19 RAPID TEST, COVR: NOT DETECTED
SARS COV-2, XPGCVT: NEGATIVE
SOURCE, COVRS: NORMAL

## 2021-01-14 ENCOUNTER — APPOINTMENT (OUTPATIENT)
Dept: INFUSION THERAPY | Age: 76
End: 2021-01-14

## 2021-01-18 ENCOUNTER — APPOINTMENT (OUTPATIENT)
Dept: INFUSION THERAPY | Age: 76
End: 2021-01-18

## 2021-01-21 ENCOUNTER — APPOINTMENT (OUTPATIENT)
Dept: INFUSION THERAPY | Age: 76
End: 2021-01-21

## 2021-01-25 ENCOUNTER — APPOINTMENT (OUTPATIENT)
Dept: INFUSION THERAPY | Age: 76
End: 2021-01-25

## 2021-01-28 ENCOUNTER — APPOINTMENT (OUTPATIENT)
Dept: INFUSION THERAPY | Age: 76
End: 2021-01-28

## 2021-01-29 LAB
BACTERIA SPEC CULT: ABNORMAL
GRAM STN SPEC: ABNORMAL
SERVICE CMNT-IMP: ABNORMAL
SERVICE CMNT-IMP: ABNORMAL

## 2021-02-01 ENCOUNTER — APPOINTMENT (OUTPATIENT)
Dept: INFUSION THERAPY | Age: 76
End: 2021-02-01

## 2021-02-04 ENCOUNTER — APPOINTMENT (OUTPATIENT)
Dept: INFUSION THERAPY | Age: 76
End: 2021-02-04

## 2021-02-08 ENCOUNTER — APPOINTMENT (OUTPATIENT)
Dept: INFUSION THERAPY | Age: 76
End: 2021-02-08

## 2021-02-17 LAB
Lab: NORMAL
REFERENCE LAB,REFLB: NORMAL
TEST DESCRIPTION:,ATST: NORMAL

## 2022-01-24 NOTE — PROGRESS NOTES
Patient running a 102.9 temp. Will not call since blood cultures were redrawn 9/26 morning. Patient also extremely short of breath and breathing fast so put 1.5L of oxygen on and patient said it helped and she was feeling better. Will leave oxygen on for now and continue to monitor. stated

## 2022-09-15 NOTE — PROGRESS NOTES
Problem: Knowledge Deficit  Goal: *Participate in the learning process  Outcome: Progressing Towards Goal  Goal: *Verbalize description of procedure  Outcome: Progressing Towards Goal  Goal: *Verbalizes understanding and describes medication purposes and frequencies  Outcome: Progressing Towards Goal  Goal: *Knowledge of discharge instructions  Outcome: Progressing Towards Goal  Goal: Interventions  Outcome: Progressing Towards Goal     Problem: Patient Education: Go to Patient Education Activity  Goal: Patient/Family Education  Outcome: Progressing Towards Goal 3

## 2023-12-07 NOTE — PROGRESS NOTES
Chart screened by  for discharge planning. No needs identified at this time. Please consult  if any new issues arise Pt is currently on Day 4 of a 10 day treatment of Dacogen. CM will continue to follow. Patient